# Patient Record
Sex: FEMALE | Race: WHITE | Employment: OTHER | URBAN - METROPOLITAN AREA
[De-identification: names, ages, dates, MRNs, and addresses within clinical notes are randomized per-mention and may not be internally consistent; named-entity substitution may affect disease eponyms.]

---

## 2017-01-10 ENCOUNTER — RADIANT APPOINTMENT (OUTPATIENT)
Dept: GENERAL RADIOLOGY | Facility: CLINIC | Age: 77
End: 2017-01-10
Attending: PHYSICIAN ASSISTANT
Payer: COMMERCIAL

## 2017-01-10 ENCOUNTER — OFFICE VISIT (OUTPATIENT)
Dept: ORTHOPEDICS | Facility: CLINIC | Age: 77
End: 2017-01-10
Payer: COMMERCIAL

## 2017-01-10 ENCOUNTER — OFFICE VISIT (OUTPATIENT)
Dept: INTERNAL MEDICINE | Facility: CLINIC | Age: 77
End: 2017-01-10
Payer: COMMERCIAL

## 2017-01-10 VITALS
OXYGEN SATURATION: 97 % | DIASTOLIC BLOOD PRESSURE: 86 MMHG | RESPIRATION RATE: 16 BRPM | HEART RATE: 88 BPM | BODY MASS INDEX: 30.73 KG/M2 | HEIGHT: 64 IN | WEIGHT: 180 LBS | TEMPERATURE: 97.4 F | SYSTOLIC BLOOD PRESSURE: 144 MMHG

## 2017-01-10 VITALS — HEIGHT: 64 IN | HEART RATE: 88 BPM | WEIGHT: 180 LBS | BODY MASS INDEX: 30.73 KG/M2

## 2017-01-10 DIAGNOSIS — S72.002D CLOSED LEFT HIP FRACTURE, WITH ROUTINE HEALING, SUBSEQUENT ENCOUNTER: Primary | ICD-10-CM

## 2017-01-10 DIAGNOSIS — G25.81 RESTLESS LEG SYNDROME: ICD-10-CM

## 2017-01-10 DIAGNOSIS — G60.9 IDIOPATHIC PERIPHERAL NEUROPATHY: ICD-10-CM

## 2017-01-10 DIAGNOSIS — M70.62 TROCHANTERIC BURSITIS OF LEFT HIP: ICD-10-CM

## 2017-01-10 DIAGNOSIS — M25.552 PAIN OF LEFT HIP JOINT: ICD-10-CM

## 2017-01-10 DIAGNOSIS — M25.552 PAIN OF LEFT HIP JOINT: Primary | ICD-10-CM

## 2017-01-10 DIAGNOSIS — S72.002A FRACTURE OF HIP, LEFT, CLOSED, INITIAL ENCOUNTER (H): ICD-10-CM

## 2017-01-10 LAB
ALBUMIN SERPL-MCNC: 2.7 G/DL (ref 3.4–5)
ALP SERPL-CCNC: 173 U/L (ref 40–150)
ALT SERPL W P-5'-P-CCNC: 20 U/L (ref 0–50)
ANION GAP SERPL CALCULATED.3IONS-SCNC: 9 MMOL/L (ref 3–14)
AST SERPL W P-5'-P-CCNC: 15 U/L (ref 0–45)
BILIRUB SERPL-MCNC: 0.3 MG/DL (ref 0.2–1.3)
BUN SERPL-MCNC: 18 MG/DL (ref 7–30)
CALCIUM SERPL-MCNC: 7.5 MG/DL (ref 8.5–10.1)
CHLORIDE SERPL-SCNC: 113 MMOL/L (ref 94–109)
CO2 SERPL-SCNC: 26 MMOL/L (ref 20–32)
CREAT SERPL-MCNC: 1.26 MG/DL (ref 0.52–1.04)
GFR SERPL CREATININE-BSD FRML MDRD: 41 ML/MIN/1.7M2
GLUCOSE SERPL-MCNC: 217 MG/DL (ref 70–99)
POTASSIUM SERPL-SCNC: 3.8 MMOL/L (ref 3.4–5.3)
PROT SERPL-MCNC: 5.5 G/DL (ref 6.8–8.8)
SODIUM SERPL-SCNC: 148 MMOL/L (ref 133–144)
TSH SERPL DL<=0.05 MIU/L-ACNC: 1.03 MU/L (ref 0.4–4)
VIT B12 SERPL-MCNC: 221 PG/ML (ref 193–986)

## 2017-01-10 PROCEDURE — 00000402 ZZHCL STATISTIC TOTAL PROTEIN: Performed by: INTERNAL MEDICINE

## 2017-01-10 PROCEDURE — 80053 COMPREHEN METABOLIC PANEL: CPT | Performed by: INTERNAL MEDICINE

## 2017-01-10 PROCEDURE — 73502 X-RAY EXAM HIP UNI 2-3 VIEWS: CPT | Mod: TC

## 2017-01-10 PROCEDURE — 36415 COLL VENOUS BLD VENIPUNCTURE: CPT | Performed by: INTERNAL MEDICINE

## 2017-01-10 PROCEDURE — 84165 PROTEIN E-PHORESIS SERUM: CPT | Performed by: INTERNAL MEDICINE

## 2017-01-10 PROCEDURE — 99213 OFFICE O/P EST LOW 20 MIN: CPT | Performed by: INTERNAL MEDICINE

## 2017-01-10 PROCEDURE — 20610 DRAIN/INJ JOINT/BURSA W/O US: CPT | Mod: LT | Performed by: ORTHOPAEDIC SURGERY

## 2017-01-10 PROCEDURE — 99213 OFFICE O/P EST LOW 20 MIN: CPT | Mod: 25 | Performed by: ORTHOPAEDIC SURGERY

## 2017-01-10 PROCEDURE — 84443 ASSAY THYROID STIM HORMONE: CPT | Performed by: INTERNAL MEDICINE

## 2017-01-10 PROCEDURE — 82607 VITAMIN B-12: CPT | Performed by: INTERNAL MEDICINE

## 2017-01-10 RX ORDER — BETAMETHASONE SODIUM PHOSPHATE AND BETAMETHASONE ACETATE 3; 3 MG/ML; MG/ML
18 INJECTION, SUSPENSION INTRA-ARTICULAR; INTRALESIONAL; INTRAMUSCULAR; SOFT TISSUE ONCE
Qty: 3 ML | Refills: 0 | COMMUNITY
Start: 2017-01-10 | End: 2017-03-03

## 2017-01-10 RX ORDER — AZITHROMYCIN 250 MG/1
TABLET, FILM COATED ORAL
Qty: 2 TABLET | Refills: 0 | Status: SHIPPED | OUTPATIENT
Start: 2017-01-10 | End: 2017-01-25

## 2017-01-10 RX ORDER — GABAPENTIN 300 MG/1
300 CAPSULE ORAL 2 TIMES DAILY
Qty: 180 CAPSULE | Refills: 1 | Status: SHIPPED | OUTPATIENT
Start: 2017-01-10 | End: 2017-05-15

## 2017-01-10 ASSESSMENT — PAIN SCALES - GENERAL
PAINLEVEL: MODERATE PAIN (5)
PAINLEVEL: NO PAIN (0)

## 2017-01-10 NOTE — NURSING NOTE
"Chief Complaint   Patient presents with     Foot Problems     bilateral foot tingling and pain       Initial /86 mmHg  Pulse 88  Temp(Src) 97.4  F (36.3  C) (Temporal)  Resp 16  Ht 5' 4\" (1.626 m)  Wt 180 lb (81.647 kg)  BMI 30.88 kg/m2  SpO2 97% Estimated body mass index is 30.88 kg/(m^2) as calculated from the following:    Height as of this encounter: 5' 4\" (1.626 m).    Weight as of this encounter: 180 lb (81.647 kg).  BP completed using cuff size: maynor Hernandez MA    "

## 2017-01-10 NOTE — PROGRESS NOTES
SUBJECTIVE:                                                    Kathryn Banks is a 76 year old female who presents to clinic today for the following health issues:    Chief Complaint   Patient presents with     Foot Problems     bilateral foot tingling and pain     She has been feeling tingling and prickling in the bottom of her feet for the last month, thought she stepped on something.  Happens more at night.  Just in the bottom of the feet, not higher or in the legs.      Patient wonders us if it's related to her hip fracture. However its full sides. She does walk around without shoes. She is usually in a wheelchair but does walk some at home.    Past Medical History   Diagnosis Date     Unspecified essential hypertension      OBSTRUCTIVE SLEEP APNEA      using CPAP     Carpal tunnel syndrome      RESTLESS LEGS SYNDROME      Other and combined forms of senile cataract 2000     Bilateral     Other and unspecified hyperlipidemia      Osteoarthrosis, unspecified whether generalized or localized, unspecified site      generalized arthritis, particularly in her hands and feet         Atrial fibrillation (H) 1/17/2007     TENOSYNOV HAND/WRIST NEC 6/30/2006     Type II or unspecified type diabetes mellitus without mention of complication, not stated as uncontrolled 2001     Diabetes mellitus/pt is diet controlled with weight loss     Coronary atherosclerosis of native coronary artery 2006     5 vessel CAD     Current Outpatient Prescriptions   Medication     azithromycin (ZITHROMAX) 250 MG tablet     gabapentin (NEURONTIN) 300 MG capsule     metoprolol (TOPROL XL) 200 MG 24 hr tablet     glipiZIDE (GLIPIZIDE XL) 10 MG 24 hr tablet     furosemide (LASIX) 40 MG tablet     Warfarin Sodium (COUMADIN PO)     IRBESARTAN PO     ferrous sulfate (IRON) 325 (65 FE) MG tablet     calcium carbonate-vitamin D 500-400 MG-UNIT TABS tablt     simvastatin (ZOCOR) 40 MG tablet     isosorbide mononitrate (IMDUR) 30 MG 24 hr tablet      "pramipexole (MIRAPEX) 1 MG tablet     acetaminophen-codeine (TYLENOL W/CODEINE NO. 3) 300-30 MG per tablet     [DISCONTINUED] gabapentin (NEURONTIN) 300 MG capsule     ASPIRIN NOT PRESCRIBED (INTENTIONAL)     ORDER FOR DME     nitroglycerin (NITROSTAT) 0.4 MG SL tablet     No current facility-administered medications for this visit.     Social History   Substance Use Topics     Smoking status: Former Smoker -- 10 years     Quit date: 01/01/1969     Smokeless tobacco: Never Used     Alcohol Use: 0.0 oz/week     0 Standard drinks or equivalent per week      Comment: occasional- 2 drinks per month     Physical Exam  /86 mmHg  Pulse 88  Temp(Src) 97.4  F (36.3  C) (Temporal)  Resp 16  Ht 5' 4\" (1.626 m)  Wt 180 lb (81.647 kg)  BMI 30.88 kg/m2  SpO2 97%  General Appearance-healthy, alert, no distress  Feet have decreased but positive pulses, so dependent rubor, no pain on palpation of the heel, plantar fascia or toes. Normal sensation intact    ASSESSMENT:  Patient appears to have some peripheral neuropathy of the location and description of the pain, possibly complicated by restless legs comes on at night. She should go back on gabapentin 300 mg twice a day. We will get labs to work this up in clinic TSH, SPEP, B-12, and comprehensive panel. She should go on oral vitamin D replacement. She should let us know if not getting better. She should also be wearing shoes support her arch and heel all the time.    Electronically signed by Dheeraj Jj MD    "

## 2017-01-10 NOTE — PROGRESS NOTES
Appropriate assistive devices provided during their visit. yes (Yes, No, N/A) wheelchair (list device)    Exam table and/or cart  placed in the lowest position. yes (Yes, No, N/A)    Brakes on tables/carts/wheelchairs used at all times. yes (Yes, No, N/A)    Non slip footwear applied. na (Yes, No, NA)    Patient was accompanied by staff throughout visit. yes (Yes, No, N/A)    Equipment safety straps used. na (Yes, No, N/A)    Assist with toileting. na (Yes, No, N/A)  Lana Xiong  1:57 PM  1/10/2017

## 2017-01-10 NOTE — MR AVS SNAPSHOT
After Visit Summary   1/10/2017    Kathryn Banks    MRN: [id number]           Patient Information     Date Of Birth          1940        Visit Information        Provider Department      1/10/2017 12:00 PM Dheeraj Jj MD [facility]         Follow-ups after your visit        Your next 10 appointments already scheduled     Antony 10, 2017  1:30 PM   Return Visit with Lake Schulz MD   [facility]    [address] [place] [id number] [phone]              Who to contact     If you have questions or need follow up information about today's clinic visit or your schedule please contact [facility] directly at [phone].  Normal or non-critical lab and imaging results will be communicated to you by ClassLinkhart, letter or phone within 4 business days after the clinic has received the results. If you do not hear from us within 7 days, please contact the clinic through ClassLink[employer] or phone. If you have a critical or abnormal lab result, we will notify you by phone as soon as possible.  Submit refill requests through [employer] or call your pharmacy and they will forward the refill request to us. Please allow 3 business days for your refill to be completed.          Additional Information About Your Visit        My[employer]Rip[employer]Online Information     [employer] gives you secure access to your electronic health record. If you see a primary care provider, you can also send messages to your care team and make appointments. If you have questions, please call your primary care clinic.  If you do not have a primary care provider, please call [phone] and they will assist you.        Care EveryWhere ID     This is your Care EveryWhere ID. This could be used by other organizations to access your [facility] medical records  [id number]        Your Vitals Were     Pulse Temperature Respirations Height BMI (Body Mass Index) Pulse Oximetry  "   88 97.4  F (36.3  C) (Temporal) 16 5' 4\" (1.626 m) 30.88 kg/m2 97%       Blood Pressure from Last 3 Encounters:   01/10/17 144/86   09/13/16 128/72   08/26/16 165/79    Weight from Last 3 Encounters:   01/10/17 180 lb (81.647 kg)   10/24/16 166 lb (75.297 kg)   09/13/16 179 lb (81.194 kg)              Today, you had the following     No orders found for display       Primary Care Provider Office Phone # Fax #    Dheeraj Jj [phone]       [facility] [address] [place] [address]        Thank you!     Thank you for choosing [facility]  for your care. Our goal is always to provide you with excellent care. Hearing back from our patients is one way we can continue to improve our services. Please take a few minutes to complete the written survey that you may receive in the mail after your visit with us. Thank you!             Your Updated Medication List - Protect others around you: Learn how to safely use, store and throw away your medicines at www.disposemymeds.org.          This list is accurate as of: 1/10/17 12:21 PM.  Always use your most recent med list.                   Brand Name Dispense Instructions for use    acetaminophen-codeine 300-30 MG per tablet    TYLENOL w/CODEINE No. 3    60 tablet    Take 1-2 tablets by mouth every 6 hours as needed for mild pain       ASPIRIN NOT PRESCRIBED    INTENTIONAL    0 each    Antiplatelet medication not prescribed intentionally due to Current anticoagulant therapy (warfarin/enoxaparin)       calcium carbonate-vitamin D 500-400 MG-UNIT Tabs per tablet     180 tablet    Take 1 tablet by mouth 3 times daily       COUMADIN PO      Take by mouth See Admin Instructions       ferrous sulfate 325 (65 FE) MG tablet    IRON    100 tablet    Take 1 tablet (325 mg) by mouth daily (with breakfast)       furosemide 40 MG tablet    LASIX    90 tablet    80 mg in the morning and 40 mg at noon       gabapentin 300 MG capsule "    NEURONTIN    180 capsule    Take 1 capsule (300 mg) by mouth 2 times daily       glipiZIDE 10 MG 24 hr tablet    glipiZIDE XL    90 tablet    Take 1 tablet (10 mg) by mouth daily       IRBESARTAN PO      Take 150 mg by mouth At Bedtime       isosorbide mononitrate 30 MG 24 hr tablet    IMDUR    30 tablet    TAKE ONE-HALF TABLET BY MOUTH EVERY DAY       metoprolol 200 MG 24 hr tablet    TOPROL XL    90 tablet    Take 1 tablet (200 mg) by mouth daily 50mg tabs:  4 tabs = 200mg po daily       nitroglycerin 0.4 MG sublingual tablet    NITROSTAT    25 tablet    Place 1 tablet under the tongue See Admin Instructions. for chest pain       order for DME     1 Device    Equipment being ordered: cpap machine, mask, humidifier, tubing and filters       pramipexole 1 MG tablet    MIRAPEX    270 tablet    TAKE 3 TABLETS BY MOUTH EVERY EVENING       simvastatin 40 MG tablet    ZOCOR    90 tablet    TAKE ONE TABLET BY MOUTH EVERY NIGHT AT BEDTIME (NEEDS TO SCHEDULE FASTING LABS BEFORE ADDITIONAL REFILLS)

## 2017-01-10 NOTE — NURSING NOTE
"Chief Complaint   Patient presents with     RECHECK     OPEN REDUCTION INTERNAL FIXATION LEFT HIP NAILING.  DOS: 7/3/16 (6 months postop)     Surgical Followup     PREOPERATIVE DIAGNOSIS:  Left hip displaced intertrochanteric fracture.  POSTOPERATIVE DIAGNOSIS:  Left hip displaced intertrochanteric fracture. PROCEDURE:  Closed reduction with internal fixation using intramedullary device.       Initial Pulse 88  Ht 1.626 m (5' 4\")  Wt 81.647 kg (180 lb)  BMI 30.88 kg/m2 Estimated body mass index is 30.88 kg/(m^2) as calculated from the following:    Height as of this encounter: 1.626 m (5' 4\").    Weight as of this encounter: 81.647 kg (180 lb).  BP completed using cuff size: NA (Not Taken)  MELIZA Bradford  "

## 2017-01-11 ENCOUNTER — TELEPHONE (OUTPATIENT)
Dept: FAMILY MEDICINE | Facility: CLINIC | Age: 77
End: 2017-01-11

## 2017-01-11 ENCOUNTER — ANTICOAGULATION THERAPY VISIT (OUTPATIENT)
Dept: ANTICOAGULATION | Facility: CLINIC | Age: 77
End: 2017-01-11

## 2017-01-11 DIAGNOSIS — I48.0 PAROXYSMAL ATRIAL FIBRILLATION (H): ICD-10-CM

## 2017-01-11 DIAGNOSIS — Z79.01 LONG-TERM (CURRENT) USE OF ANTICOAGULANTS: Primary | ICD-10-CM

## 2017-01-11 LAB
ALBUMIN SERPL ELPH-MCNC: 2.9 G/DL (ref 3.7–5.1)
ALPHA1 GLOB SERPL ELPH-MCNC: 0.3 G/DL (ref 0.2–0.4)
ALPHA2 GLOB SERPL ELPH-MCNC: 0.7 G/DL (ref 0.5–0.9)
B-GLOBULIN SERPL ELPH-MCNC: 0.6 G/DL (ref 0.6–1)
GAMMA GLOB SERPL ELPH-MCNC: 0.6 G/DL (ref 0.7–1.6)
INR PPP: 1.1
M PROTEIN SERPL ELPH-MCNC: 0 G/DL
PROT PATTERN SERPL ELPH-IMP: ABNORMAL

## 2017-01-11 NOTE — PROGRESS NOTES
ANTICOAGULATION FOLLOW-UP CLINIC VISIT    Patient Name:  Kathryn Banks  Date:  1/11/2017  Contact Type:  Telephone    SUBJECTIVE:     Patient Findings     Positives Missed doses (pt missed 'a couple doses' but is not sure which days)    Comments Reason for call:  INR Reason for Call:  INR      Who is calling?  Home calling center      Phone number:  [phone] option 2        Fax number:  na      Name of caller: Christiana      INR Value:  1.1       Are there any other concerns:  NO    Can we leave a detailed message on this number? NO      Phone number patient can be reached at: Other phone number:  [phone] option 2            Call taken on 1/11/2017 at 3:15 PM by Michelle Leslie              OBJECTIVE    INR   Date Value Ref Range Status   01/11/2017 1.1  Final       ASSESSMENT / PLAN  INR assessment SUB    Recheck INR In: 2 DAYS    INR Location Home INR    Billed home INR Yes      Anticoagulation Summary as of 1/11/2017     INR goal 2.0-3.0   Selected INR 1.1! (1/11/2017)   Maintenance plan 2.5 mg (2.5 mg x 1) on Sun, Tue, Thu; 5 mg (2.5 mg x 2) all other days   Full instructions 1/12: 5 mg; Otherwise 2.5 mg on Sun, Tue, Thu; 5 mg all other days   Weekly total 27.5 mg   Plan last modified Yovani Ruvalcaba, RN (9/2/2016)   Next INR check 1/13/2017   Target end date     Indications   Long-term (current) use of anticoagulants [Z79.01] [Z79.01]  Atrial fibrillation (H) (Resolved) [I48.91]  Paroxysmal atrial fibrillation (H) [I48.0]         Anticoagulation Episode Summary     INR check location     Preferred lab     Send INR reminders to [facility] RANDI    Comments 2.5 mg tablets, pm dose. Alere home monitor      Anticoagulation Care Providers     Provider Role Specialty Phone number    Dheeraj Jj MD [facility] [phone]            See the Encounter Report to view Anticoagulation Flowsheet and Dosing Calendar (Go to Encounters tab in chart review, and find the Anticoagulation Therapy  Visit)    Dosage adjustment made based on physician directed care plan.    Pt's INR on home monitor was 1.1- pt states she 'missed a couple doses' but is not sure which days. I have advised her to take 5 mg Wed and Thurs, recheck on Friday 1/13.    Zo Norwood RN

## 2017-01-11 NOTE — TELEPHONE ENCOUNTER
Reason for call:  INR Reason for Call:  INR    Who is calling?  Home calling center    Phone number:  [phone] option 2    Fax number:  na    Name of caller: Christiana    INR Value:  1.1     Are there any other concerns:  NO   Can we leave a detailed message on this number? NO    Phone number patient can be reached at: Other phone number:  [phone] option 2      Call taken on 1/11/2017 at 3:15 PM by Michelle Leslie

## 2017-01-13 ENCOUNTER — ANTICOAGULATION THERAPY VISIT (OUTPATIENT)
Dept: ANTICOAGULATION | Facility: CLINIC | Age: 77
End: 2017-01-13

## 2017-01-13 DIAGNOSIS — Z79.01 LONG-TERM (CURRENT) USE OF ANTICOAGULANTS: Primary | ICD-10-CM

## 2017-01-13 DIAGNOSIS — I48.0 PAROXYSMAL ATRIAL FIBRILLATION (H): ICD-10-CM

## 2017-01-13 LAB — INR PPP: 1.8

## 2017-01-13 RX ORDER — WARFARIN SODIUM 2.5 MG/1
TABLET ORAL
Qty: 50 TABLET | Refills: 0 | Status: SHIPPED | OUTPATIENT
Start: 2017-01-13 | End: 2017-04-19

## 2017-01-13 RX ORDER — WARFARIN SODIUM 2.5 MG/1
TABLET ORAL
Qty: 50 TABLET | Refills: 0 | Status: SHIPPED | OUTPATIENT
Start: 2017-01-13 | End: 2017-01-13

## 2017-01-13 NOTE — PROGRESS NOTES
ANTICOAGULATION FOLLOW-UP CLINIC VISIT    Patient Name:  Kathryn Banks  Date:  1/13/2017  Contact Type:  Telephone    SUBJECTIVE:     Patient Findings     Positives Missed doses (pt missed a couple days this last week but is unsure of the days)           OBJECTIVE    INR   Date Value Ref Range Status   01/13/2017 1.8  Final       ASSESSMENT / PLAN  INR assessment SUB    Recheck INR In: 1 WEEK    INR Location Home INR    Billed home INR No      Anticoagulation Summary as of 1/13/2017     INR goal 2.0-3.0   Selected INR 1.8! (1/13/2017)   Maintenance plan 2.5 mg (2.5 mg x 1) on Tue, Thu, Sat; 5 mg (2.5 mg x 2) all other days   Full instructions 2.5 mg on Tue, Thu, Sat; 5 mg all other days   Weekly total 27.5 mg   Plan last modified Zo Norwood RN (1/13/2017)   Next INR check 1/20/2017   Target end date     Indications   Long-term (current) use of anticoagulants [Z79.01] [Z79.01]  Atrial fibrillation (H) (Resolved) [I48.91]  Paroxysmal atrial fibrillation (H) [I48.0]         Anticoagulation Episode Summary     INR check location     Preferred lab     Send [facility] reminders to [facility] POOL    Comments 2.5 mg tablets, pm dose. Alere home monitor      Anticoagulation Care Providers     Provider Role Specialty Phone number    Dheeraj Jj MD [facility] [phone]            See the Encounter Report to view Anticoagulation Flowsheet and Dosing Calendar (Go to Encounters tab in chart review, and find the Anticoagulation Therapy Visit)    Dosage adjustment made based on physician directed care plan.     Pt's INR was 1.8 today. She took 5 mg on Wednesday and Thursday.  Her INR earlier this week was 1.1 but she stated she missed a couple days but is unsure of which days. I have advised her to go back to her usual dose of 2.5 mg Tues, Thurs, Sat and 5 mg ROW and recheck in a week, as she has been stable on that dose.             Zo Norwood, RN

## 2017-01-17 DIAGNOSIS — S72.002D CLOSED LEFT HIP FRACTURE, WITH ROUTINE HEALING, SUBSEQUENT ENCOUNTER: ICD-10-CM

## 2017-01-17 DIAGNOSIS — I25.2 HX OF NON-ST ELEVATION MYOCARDIAL INFARCTION (NSTEMI): Primary | ICD-10-CM

## 2017-01-17 NOTE — TELEPHONE ENCOUNTER
IMDUR      Last Written Prescription Date: 1/19/16  Last Fill Quantity: 30,  # refills: 2   Last Office Visit with [facility] or [facility] prescribing provider: 1/10/17                                               Maxine Gu MA 1/17/2017

## 2017-01-18 RX ORDER — ISOSORBIDE MONONITRATE 30 MG/1
15 TABLET, EXTENDED RELEASE ORAL DAILY
Qty: 30 TABLET | Refills: 2 | Status: SHIPPED | OUTPATIENT
Start: 2017-01-18 | End: 2017-03-03

## 2017-01-18 NOTE — TELEPHONE ENCOUNTER
Routing refill request to provider for review/approval because:  A break in medication  Jing Vu RN

## 2017-01-20 ENCOUNTER — ANTICOAGULATION THERAPY VISIT (OUTPATIENT)
Dept: ANTICOAGULATION | Facility: CLINIC | Age: 77
End: 2017-01-20

## 2017-01-20 DIAGNOSIS — I48.0 PAROXYSMAL ATRIAL FIBRILLATION (H): ICD-10-CM

## 2017-01-20 DIAGNOSIS — Z79.01 LONG-TERM (CURRENT) USE OF ANTICOAGULANTS: Primary | ICD-10-CM

## 2017-01-24 ENCOUNTER — ANTICOAGULATION THERAPY VISIT (OUTPATIENT)
Dept: ANTICOAGULATION | Facility: CLINIC | Age: 77
End: 2017-01-24

## 2017-01-24 DIAGNOSIS — Z79.01 LONG-TERM (CURRENT) USE OF ANTICOAGULANTS: ICD-10-CM

## 2017-01-24 DIAGNOSIS — Z79.01 LONG-TERM (CURRENT) USE OF ANTICOAGULANTS: Primary | ICD-10-CM

## 2017-01-24 DIAGNOSIS — I48.0 PAROXYSMAL ATRIAL FIBRILLATION (H): ICD-10-CM

## 2017-01-24 LAB — INR BLD: 1.5 (ref 0.86–1.14)

## 2017-01-24 PROCEDURE — 36416 COLLJ CAPILLARY BLOOD SPEC: CPT | Performed by: INTERNAL MEDICINE

## 2017-01-24 PROCEDURE — 85610 PROTHROMBIN TIME: CPT | Mod: QW | Performed by: INTERNAL MEDICINE

## 2017-01-24 NOTE — PROGRESS NOTES
ANTICOAGULATION FOLLOW-UP CLINIC VISIT    Patient Name:  Kathryn Banks  Date:  1/24/2017  Contact Type:  Telephone    SUBJECTIVE:     Patient Findings     Positives Missed doses (missed Tues, Wed, Thurs last week)           OBJECTIVE    INR POINT OF CARE   Date Value Ref Range Status   01/24/2017 1.5* 0.86 - 1.14 Final     Comment:     This test is intended for monitoring Coumadin therapy.  Results are not   accurate   in patients with prolonged INR due to factor deficiency.         ASSESSMENT / PLAN  INR assessment SUB    Recheck INR In: 1 WEEK    INR Location Outside lab      Anticoagulation Summary as of 1/24/2017     INR goal 2.0-3.0   Selected INR 1.5! (1/24/2017)   Maintenance plan 2.5 mg (2.5 mg x 1) on Mon, Wed, Fri; 5 mg (2.5 mg x 2) all other days   Full instructions 2.5 mg on Mon, Wed, Fri; 5 mg all other days   Weekly total 27.5 mg   Plan last modified Zo Norwood RN (1/24/2017)   Next INR check 1/31/2017   Target end date     Indications   Long-term (current) use of anticoagulants [Z79.01] [Z79.01]  Atrial fibrillation (H) (Resolved) [I48.91]  Paroxysmal atrial fibrillation (H) [I48.0]         Anticoagulation Episode Summary     INR check location     Preferred lab     Send INR reminders to [facility] RANDI    Comments 2.5 mg tablets, pm dose. Alere home monitor      Anticoagulation Care Providers     Provider Role Specialty Phone number    Dheeraj Jj MD [facility] Internal Medicine [phone]            See the Encounter Report to view Anticoagulation Flowsheet and Dosing Calendar (Go to Encounters tab in chart review, and find the Anticoagulation Therapy Visit)    Delete key to remove if not charging [id number]    S/O:  Pt was called with the following orders: INR today of 1.5- lab.  Pt is on Coumadin for A. Fib.  Current Coumadin dose is missed Tues, Wed, Thurs and then took 5 mg Fri, Sat, Sun, Mon = 20 mg.   Concerns today are: None Noted.    A/P: Pt's INR is Subtherapeutic  for his/her goal  range of 2 - 3.  Reasons why INR is out of range may include:missed doses and ate more greens than normal last night  Recommended to have pt take 2.5 mg Mon, Wed, Fri and 5 mg ROW = 27.5 mg and to have his/her INR rechecked in 1 week on 1/*31.    Pt ran out of supplies so she had to have INR done at the lab. She said she called [facility] who said they will be arriving in a few days.           Zo Norwood RN

## 2017-01-25 RX ORDER — AZITHROMYCIN 250 MG/1
TABLET, FILM COATED ORAL
Qty: 2 TABLET | Refills: 0 | Status: SHIPPED | OUTPATIENT
Start: 2017-01-25 | End: 2017-03-03

## 2017-01-25 NOTE — TELEPHONE ENCOUNTER
Zithromax      Last Written Prescription Date: 1/10/17  Last Fill Quantity: 2,  # refills: 0   Last Office Visit with [facility], [facility] or [facility] prescribing provider: 1/10/2017

## 2017-02-01 ENCOUNTER — TELEPHONE (OUTPATIENT)
Dept: INTERNAL MEDICINE | Facility: CLINIC | Age: 77
End: 2017-02-01

## 2017-02-01 NOTE — TELEPHONE ENCOUNTER
Reason for Call:  Medication or medication refill:    Do you use a [facility]?  Name of the pharmacy and phone number for the current request:  [facility] - [phone]    Name of the medication requested: INR monitor, test strips and lancets    Other request: patient states her insurance changed and she has this stuff through her old insurance but will need to send it back. Patient stated that [facility] told her to call the clinic and get a script or order for this     Can we leave a detailed message on this number? YES    Phone number patient can be reached at: Home number on file [phone] (home)    Best Time: any    Call taken on 2/1/2017 at 10:42 AM by Jo-Ann Bartlett

## 2017-02-01 NOTE — TELEPHONE ENCOUNTER
Spoke with patient and she a home INR monitor.  She was told that the one she has belongs to her insurance and she needs to contact her provider to get a new one.  She was a bit confused about this as well as I am, I don't recall ever ordering a home INR monitor.  Will talk with coumadin clinic to see if they know anything about ordering a home monitor.

## 2017-02-01 NOTE — TELEPHONE ENCOUNTER
I am in contact with Tara [facility] to see what can be done/who she should contact. Awaiting response.   Zo Norwood RN

## 2017-02-01 NOTE — TELEPHONE ENCOUNTER
Per Tara, through [facility]- she will let her know that insurance has changed and will verify what to do next. Tara is aware that I am out of the office tomorrow, but she can call [phone] if she figures anything out in the meantime.   Pt notified of all of this. She will have IN done at the lab tomorrow since she does not have home monitor supplies at the moment  oZ Norwood RN

## 2017-02-03 ENCOUNTER — TRANSFERRED RECORDS (OUTPATIENT)
Dept: HEALTH INFORMATION MANAGEMENT | Facility: CLINIC | Age: 77
End: 2017-02-03

## 2017-02-04 LAB — EJECTION FRACTION: 63

## 2017-02-06 NOTE — TELEPHONE ENCOUNTER
I spoke with pt's , Christian, who said that she is at [facility] after taking a fall last week.   Zo Norwood RN

## 2017-02-08 LAB — EJECTION FRACTION: 63

## 2017-02-08 NOTE — TELEPHONE ENCOUNTER
I have attempted to contact this patient by phone, left message to return call at [phone] or [phone].  Will try again later.    Nicik Calvillo RN- Coumadin Clinic RN

## 2017-02-20 ENCOUNTER — TRANSFERRED RECORDS (OUTPATIENT)
Dept: HEALTH INFORMATION MANAGEMENT | Facility: CLINIC | Age: 77
End: 2017-02-20

## 2017-02-20 NOTE — TELEPHONE ENCOUNTER
I have attempted to contact this patient by phone, left message to return call at [phone] or [phone].  Will try again later.    Nicki Calvillo RN- Coumadin Clinic RN

## 2017-02-21 NOTE — TELEPHONE ENCOUNTER
Multiple attempts to contact, no phone call back. Will close encounter and reach out again in about a week.     Nicki Calvillo RN- Coumadin Clinic RN

## 2017-03-02 ENCOUNTER — TRANSFERRED RECORDS (OUTPATIENT)
Dept: HEALTH INFORMATION MANAGEMENT | Facility: CLINIC | Age: 77
End: 2017-03-02

## 2017-03-02 ENCOUNTER — TELEPHONE (OUTPATIENT)
Dept: INTERNAL MEDICINE | Facility: CLINIC | Age: 77
End: 2017-03-02

## 2017-03-02 NOTE — TELEPHONE ENCOUNTER
Reason for Call:  Same Day Appointment, Requested Provider:  Dheeraj Jj MD    PCP: Dheeraj Jj    Reason for visit: preop DOS 3/8/17  - pin removal from [facility]    Duration of symptoms: n/a    Have you been treated for this in the past? Yes    Additional comments:     Can we leave a detailed message on this number? YES    Phone number patient can be reached at: Home number on file [phone] (home)    Best Time:     Call taken on 3/2/2017 at 1:04 PM by Agatha Rosenthal

## 2017-03-03 ENCOUNTER — TELEPHONE (OUTPATIENT)
Dept: INTERNAL MEDICINE | Facility: CLINIC | Age: 77
End: 2017-03-03

## 2017-03-03 ENCOUNTER — OFFICE VISIT (OUTPATIENT)
Dept: INTERNAL MEDICINE | Facility: CLINIC | Age: 77
End: 2017-03-03
Payer: COMMERCIAL

## 2017-03-03 VITALS
SYSTOLIC BLOOD PRESSURE: 156 MMHG | OXYGEN SATURATION: 97 % | HEART RATE: 96 BPM | DIASTOLIC BLOOD PRESSURE: 94 MMHG | RESPIRATION RATE: 12 BRPM | BODY MASS INDEX: 29.87 KG/M2 | WEIGHT: 174 LBS | TEMPERATURE: 97.8 F

## 2017-03-03 DIAGNOSIS — N18.30 CHRONIC KIDNEY DISEASE, STAGE III (MODERATE) (H): ICD-10-CM

## 2017-03-03 DIAGNOSIS — Z79.01 LONG TERM CURRENT USE OF ANTICOAGULANT THERAPY: ICD-10-CM

## 2017-03-03 DIAGNOSIS — M79.89 SWELLING OF LIMB: ICD-10-CM

## 2017-03-03 DIAGNOSIS — E11.59 TYPE 2 DIABETES MELLITUS WITH OTHER CIRCULATORY COMPLICATIONS (H): ICD-10-CM

## 2017-03-03 DIAGNOSIS — Z01.818 PREOP GENERAL PHYSICAL EXAM: Primary | ICD-10-CM

## 2017-03-03 DIAGNOSIS — I25.2 HX OF NON-ST ELEVATION MYOCARDIAL INFARCTION (NSTEMI): ICD-10-CM

## 2017-03-03 DIAGNOSIS — S72.002G CLOSED LEFT HIP FRACTURE, WITH DELAYED HEALING, SUBSEQUENT ENCOUNTER: ICD-10-CM

## 2017-03-03 DIAGNOSIS — I25.10 ATHEROSCLEROSIS OF NATIVE CORONARY ARTERY OF NATIVE HEART WITHOUT ANGINA PECTORIS: ICD-10-CM

## 2017-03-03 LAB
ALBUMIN SERPL-MCNC: 2.8 G/DL (ref 3.4–5)
ALP SERPL-CCNC: 273 U/L (ref 40–150)
ALT SERPL W P-5'-P-CCNC: 29 U/L (ref 0–50)
ANION GAP SERPL CALCULATED.3IONS-SCNC: 8 MMOL/L (ref 3–14)
AST SERPL W P-5'-P-CCNC: 18 U/L (ref 0–45)
BILIRUB SERPL-MCNC: 0.3 MG/DL (ref 0.2–1.3)
BUN SERPL-MCNC: 34 MG/DL (ref 7–30)
CALCIUM SERPL-MCNC: 7.9 MG/DL (ref 8.5–10.1)
CHLORIDE SERPL-SCNC: 113 MMOL/L (ref 94–109)
CO2 SERPL-SCNC: 25 MMOL/L (ref 20–32)
CREAT SERPL-MCNC: 1.41 MG/DL (ref 0.52–1.04)
ERYTHROCYTE [DISTWIDTH] IN BLOOD BY AUTOMATED COUNT: 19 % (ref 10–15)
GFR SERPL CREATININE-BSD FRML MDRD: 36 ML/MIN/1.7M2
GLUCOSE SERPL-MCNC: 123 MG/DL (ref 70–99)
HCT VFR BLD AUTO: 38.1 % (ref 35–47)
HGB BLD-MCNC: 11.3 G/DL (ref 11.7–15.7)
MCH RBC QN AUTO: 23.9 PG (ref 26.5–33)
MCHC RBC AUTO-ENTMCNC: 29.7 G/DL (ref 31.5–36.5)
MCV RBC AUTO: 81 FL (ref 78–100)
PLATELET # BLD AUTO: 274 10E9/L (ref 150–450)
POTASSIUM SERPL-SCNC: 4.1 MMOL/L (ref 3.4–5.3)
PROT SERPL-MCNC: 5.9 G/DL (ref 6.8–8.8)
RBC # BLD AUTO: 4.73 10E12/L (ref 3.8–5.2)
SODIUM SERPL-SCNC: 146 MMOL/L (ref 133–144)
WBC # BLD AUTO: 7.2 10E9/L (ref 4–11)

## 2017-03-03 PROCEDURE — 36415 COLL VENOUS BLD VENIPUNCTURE: CPT | Performed by: INTERNAL MEDICINE

## 2017-03-03 PROCEDURE — 80053 COMPREHEN METABOLIC PANEL: CPT | Performed by: INTERNAL MEDICINE

## 2017-03-03 PROCEDURE — 93000 ELECTROCARDIOGRAM COMPLETE: CPT | Performed by: INTERNAL MEDICINE

## 2017-03-03 PROCEDURE — 85027 COMPLETE CBC AUTOMATED: CPT | Performed by: INTERNAL MEDICINE

## 2017-03-03 PROCEDURE — 99215 OFFICE O/P EST HI 40 MIN: CPT | Performed by: INTERNAL MEDICINE

## 2017-03-03 RX ORDER — METOPROLOL SUCCINATE 100 MG/1
150 TABLET, EXTENDED RELEASE ORAL DAILY
Qty: 90 TABLET | Refills: 3 | Status: ON HOLD | COMMUNITY
Start: 2017-03-03 | End: 2017-05-02

## 2017-03-03 RX ORDER — FUROSEMIDE 40 MG
TABLET ORAL
Qty: 90 TABLET | Refills: 3 | Status: ON HOLD | COMMUNITY
Start: 2017-03-03 | End: 2017-05-02

## 2017-03-03 RX ORDER — ISOSORBIDE MONONITRATE 30 MG/1
90 TABLET, EXTENDED RELEASE ORAL DAILY
Qty: 30 TABLET | Refills: 2 | COMMUNITY
Start: 2017-03-03 | End: 2017-05-18

## 2017-03-03 ASSESSMENT — PAIN SCALES - GENERAL: PAINLEVEL: MODERATE PAIN (5)

## 2017-03-03 NOTE — MR AVS SNAPSHOT
After Visit Summary   3/3/2017    Kathryn Banks    MRN: [id number]           Patient Information     Date Of Birth          1940        Visit Information        Provider Department      3/3/2017 4:15 PM Dheeraj Jj MD [facility]        Today's Diagnoses     Preop general physical exam    -  1    Hx of non-ST elevation myocardial infarction (NSTEMI)        Swelling of limb        Closed left hip fracture, with delayed healing, subsequent encounter        Chronic kidney disease, stage III (moderate)        Atherosclerosis of native coronary artery of native heart without angina pectoris        Long term current use of anticoagulant therapy        Type 2 diabetes mellitus with other circulatory complications (H)          Care Instructions      Before Your Surgery      Call your surgeon if there is any change in your health. This includes signs of a cold or flu (such as a sore throat, runny nose, cough, rash or fever).    Do not smoke, drink alcohol or take over the counter medicine (unless your surgeon or primary care doctor tells you to) for the 24 hours before and after surgery.    If you take prescribed drugs: Follow your doctor s orders about which medicines to take and which to stop until after surgery.    Eating and drinking prior to surgery: follow the instructions from your surgeon    Take a shower or bath the night before surgery. Use the soap your surgeon gave you to gently clean your skin. If you do not have soap from your surgeon, use your regular soap. Do not shave or scrub the surgery site.  Wear clean pajamas and have clean sheets on your bed.         Follow-ups after your visit        Who to contact     If you have questions or need follow up information about today's clinic visit or your schedule please contact [facility] directly at [phone].  Normal or non-critical lab and imaging results will be communicated to you by MyChart, letter or  phone within 4 business days after the clinic has received the results. If you do not hear from us within 7 days, please contact the clinic through [employer] or phone. If you have a critical or abnormal lab result, we will notify you by phone as soon as possible.  Submit refill requests through [employer] or call your pharmacy and they will forward the refill request to us. Please allow 3 business days for your refill to be completed.          Additional Information About Your Visit        [employer] Information     [employer] gives you secure access to your electronic health record. If you see a primary care provider, you can also send messages to your care team and make appointments. If you have questions, please call your primary care clinic.  If you do not have a primary care provider, please call [phone] and they will assist you.        Care EveryWhere ID     This is your Care EveryWhere ID. This could be used by other organizations to access your [place] medical records  [id number]        Your Vitals Were     Pulse Temperature Respirations Pulse Oximetry BMI (Body Mass Index)       96 97.8  F (36.6  C) (Tympanic) 12 97% 29.87 kg/m2        Blood Pressure from Last 3 Encounters:   03/03/17 (!) 156/94   01/10/17 144/86   09/13/16 128/72    Weight from Last 3 Encounters:   03/03/17 174 lb (78.9 kg)   01/10/17 180 lb (81.6 kg)   01/10/17 180 lb (81.6 kg)              We Performed the Following     CBC with platelets     Comprehensive metabolic panel     EKG 12-lead complete w/read - Clinics          Today's Medication Changes          These changes are accurate as of: 3/3/17  4:41 PM.  If you have any questions, ask your nurse or doctor.               These medicines have changed or have updated prescriptions.        Dose/Directions    isosorbide mononitrate 30 MG 24 hr tablet   Commonly known as:  IMDUR   This may have changed:  how much to take   Used for:  Hx of non-ST elevation myocardial infarction (NSTEMI)    Changed by:  Dheeraj Jj MD        Dose:  90 mg   Take 3 tablets (90 mg) by mouth daily   Quantity:  30 tablet   Refills:  2       LASIX 40 MG tablet   This may have changed:  additional instructions   Used for:  Swelling of limb   Generic drug:  furosemide   Changed by:  Dheeraj Jj MD        40 mg a day   Quantity:  90 tablet   Refills:  3       metoprolol 100 MG 24 hr tablet   Commonly known as:  TOPROL-XL   This may have changed:  Another medication with the same name was removed. Continue taking this medication, and follow the directions you see here.   Changed by:  Dheeraj Jj MD        Dose:  150 mg   Take 1.5 tablets (150 mg) by mouth daily   Quantity:  90 tablet   Refills:  3         Stop taking these medicines if you haven't already. Please contact your care team if you have questions.     acetaminophen-codeine 300-30 MG per tablet   Commonly known as:  TYLENOL w/CODEINE No. 3   Stopped by:  Dheeraj Jj MD           betamethasone acet & sod phos 6 (3-3) MG/ML Susp injection   Commonly known as:  CELESTONE   Stopped by:  Dheeraj Jj MD           IRBESARTAN PO   Stopped by:  Dheeraj Jj MD                    Primary Care Provider Office Phone # Fax #    Dheeraj Jj [phone]       [facility] [address] [place] [address]        Thank you!     Thank you for choosing [facility]  for your care. Our goal is always to provide you with excellent care. Hearing back from our patients is one way we can continue to improve our services. Please take a few minutes to complete the written survey that you may receive in the mail after your visit with us. Thank you!             Your Updated Medication List - Protect others around you: Learn how to safely use, store and throw away your medicines at www.disposemymeds.org.          This list is accurate as of: 3/3/17  4:41 PM.  Always use your most recent med list.                   Brand  Name Dispense Instructions for use    ASPIRIN NOT PRESCRIBED    INTENTIONAL    0 each    Antiplatelet medication not prescribed intentionally due to Current anticoagulant therapy (warfarin/enoxaparin)       calcium carbonate-vitamin D 500-400 MG-UNIT Tabs per tablet     180 tablet    Take 1 tablet by mouth 3 times daily       ferrous sulfate 325 (65 FE) MG tablet    IRON    100 tablet    Take 1 tablet (325 mg) by mouth daily (with breakfast)       gabapentin 300 MG capsule    NEURONTIN    180 capsule    Take 1 capsule (300 mg) by mouth 2 times daily       glipiZIDE 10 MG 24 hr tablet    glipiZIDE XL    90 tablet    Take 1 tablet (10 mg) by mouth daily       isosorbide mononitrate 30 MG 24 hr tablet    IMDUR    30 tablet    Take 3 tablets (90 mg) by mouth daily       LASIX 40 MG tablet   Generic drug:  furosemide     90 tablet    40 mg a day       metoprolol 100 MG 24 hr tablet    TOPROL-XL    90 tablet    Take 1.5 tablets (150 mg) by mouth daily       nitroglycerin 0.4 MG sublingual tablet    NITROSTAT    25 tablet    Place 1 tablet under the tongue See Admin Instructions. for chest pain       order for DME     1 Device    Equipment being ordered: cpap machine, mask, humidifier, tubing and filters       pramipexole 1 MG tablet    MIRAPEX    270 tablet    TAKE 3 TABLETS BY MOUTH EVERY EVENING       simvastatin 40 MG tablet    ZOCOR    90 tablet    TAKE ONE TABLET BY MOUTH EVERY NIGHT AT BEDTIME (NEEDS TO SCHEDULE FASTING LABS BEFORE ADDITIONAL REFILLS)       warfarin 2.5 MG tablet    COUMADIN    50 tablet    Take 2.5 mg (1 tablet) on Tuesday, Thursday, Saturday and 5 mg (2 tablets) all other days of the week, or as directed by the coumadin clinic

## 2017-03-03 NOTE — TELEPHONE ENCOUNTER
Reason for Call: Request for an order or referral:    Order or referral being requested: [facility] is requesting delay of orders until Sunday 3.5, please advise    Date needed: as soon as possible    Has the patient been seen by the PCP for this problem? YES    Additional comments:     Phone number Patient can be reached at:  Other phone number:  [phone]  Best Time:      Can we leave a detailed message on this number?  YES    Call taken on 3/3/2017 at 1:21 PM by Josephine Tejeda

## 2017-03-03 NOTE — PROGRESS NOTES
[address] [address]  [phone]  Dept: [phone]    PRE-OP EVALUATION:  Today's date: 3/3/2017    Kathryn Banks (: 1940) presents for pre-operative evaluation assessment as requested by Dr. Menchaca.  She requires evaluation and anesthesia risk assessment prior to undergoing surgery/procedure for treatment of removal of pin in left hip .  Proposed procedure: removal of pin in left hip    Date of Surgery/ Procedure: 3/8/2017  Time of Surgery/ Procedure:   Hospital/Surgical Facility: [facility]  Fax number for surgical facility: [phone]  Primary Physician: Dheeraj Jj  Type of Anesthesia Anticipated: General    Patient has a Health Care Directive or Living Will:  YES     1. NO - Do you have a history of heart attack, stroke, stent, bypass or surgery on an artery in the head, neck, heart or legs?  2. NO - Do you ever have any pain or discomfort in your chest?  3. YES - DO YOU HAVE A HISTORY OF HEART FAILURE -stable at this time on lasix.  4. NO - Are you troubled by shortness of breath when: walking on the level, up a slight hill or at night?  5. NO - Do you currently have a cold, bronchitis or other respiratory infection?  6. NO - Do you have a cough, shortness of breath or wheezing?  7. NO - Do you sometimes get pains in the calves of your legs when you walk?  8. NO - Do you or anyone in your family have previous history of blood clots?  9. NO - Do you or does anyone in your family have a serious bleeding problem such as prolonged bleeding following surgeries or cuts?  10. YES - HAVE YOU EVER HAD PROBLEMS WITH ANEMIA OR BEEN TOLD TO TAKE IRON PILLS?  Iron pills  11. NO - Have you had any abnormal blood loss such as black, tarry or bloody stools, or abnormal vaginal bleeding?  12. YES - HAVE YOU EVER HAD A BLOOD TRANSFUSION?    13. NO - Have you or any of your relatives ever had problems with anesthesia?  14. YES - DO YOU HAVE SLEEP APNEA,  EXCESSIVE SNORING OR DAYTIME DROWSINESS? Wears cpap  15. NO - Do you have any prosthetic heart valves?  16. NO - Do you have prosthetic joints?  17. NO - Is there any chance that you may be pregnant?      HPI:                                                      Brief HPI related to upcoming procedure: needs to have left hip repaired again      See problem list for active medical problems.  Problems all longstanding and stable, except as noted/documented.  See ROS for pertinent symptoms related to these conditions.                                                                                                  .    MEDICAL HISTORY:                                                      Patient Active Problem List    Diagnosis Date Noted     Trochanteric bursitis of left hip 10/11/2016     Priority: Medium     Essential hypertension with goal blood pressure less than 140/90 09/13/2016     Priority: Medium     Acute respiratory failure with hypoxia (H) 07/05/2016     Priority: Medium     Pulmonary infiltrate 07/02/2016     Priority: Medium     Acute kidney injury (H) 07/02/2016     Priority: Medium     Fracture of hip, left, closed, initial encounter (H) 07/02/2016     Priority: Medium     SIRS (systemic inflammatory response syndrome) (H) 07/02/2016     Priority: Medium     Long-term (current) use of anticoagulants [Z79.01] 03/04/2016     Priority: Medium     Type 2 diabetes mellitus with other circulatory complications (H) 01/19/2016     Priority: Medium     Non morbid obesity, unspecified obesity type 11/01/2015     Priority: Medium     Osteoarthritis of hip 04/29/2015     Priority: Medium     Renal failure 03/23/2015     Priority: Medium     Lymphedema 11/10/2014     Priority: Medium     Anemia 11/10/2014     Priority: Medium     Hyperkalemia 11/10/2014     Priority: Medium     Long term current use of anticoagulant therapy 09/16/2014     Priority: Medium     Problem list name updated by automated process. Provider  to review       Hypokalemia 07/15/2014     Priority: Medium     Jaw pain 02/22/2014     Priority: Medium     Health Care Home 01/20/2014     Priority: Medium     State Tier Level:  Tier 3  Status:  Closed  Care Coordinator:  Pinky Oden if needed in the future.     See Letters for [facility] Care Plan         Paroxysmal atrial fibrillation (H) 01/17/2014     Priority: Medium     CAD - NSTEMI, CABG x 5 2007, angio in 2013 with patent grafts other than occluded graft to PL 02/04/2013     Priority: Medium     Hx of non-ST elevation myocardial infarction (NSTEMI) 02/04/2013     Priority: Medium     ACS (acute coronary syndrome) (H) 02/01/2013     Priority: Medium     Hyperlipidemia LDL goal <100 10/31/2010     Priority: Medium     Kidney stone 12/29/2009     Priority: Medium     Obesity 01/31/2008     Priority: Medium     Problem list name updated by automated process. Provider to review       Edema 01/31/2008     Priority: Medium     Chronic kidney disease, stage III (moderate) 10/17/2007     Priority: Medium     ESOPHAGEAL REFLUX 03/28/2006     Priority: Medium     Lipoma of other skin and subcutaneous tissue 09/07/2004     Priority: Medium     Sleep apnea      Priority: Medium     Problem list name updated by automated process. Provider to review       Advanced directives, counseling/discussion 11/12/2012     Priority: Low     Advance Directive received and scanned. Click on Code in the patient header to view. 11/12/2012          History of peptic ulcer disease 10/17/2007     Priority: Low     Problem list name updated by automated process. Provider to review       Postsurgical aortocoronary bypass status 01/27/2007     Priority: Low     Restless leg syndrome      Priority: Low      Past Medical History   Diagnosis Date     Atrial fibrillation (H) 1/17/2007     Carpal tunnel syndrome      Coronary atherosclerosis of native coronary artery 2006     5 vessel CAD     OBSTRUCTIVE SLEEP APNEA      using CPAP     Osteoarthrosis,  unspecified whether generalized or localized, unspecified site      generalized arthritis, particularly in her hands and feet         Other and combined forms of senile cataract 2000     Bilateral     Other and unspecified hyperlipidemia      RESTLESS LEGS SYNDROME      TENOSYNOV HAND/WRIST NEC 6/30/2006     Type II or unspecified type diabetes mellitus without mention of complication, not stated as uncontrolled 2001     Diabetes mellitus/pt is diet controlled with weight loss     Unspecified essential hypertension      Past Surgical History   Procedure Laterality Date     C total abdom hysterectomy  1995      - fibroids     Hc removal of ovary/tube(s)       Salpingo-Oophorectomy, Unilateral     C appendectomy       Hc revise median n/carpal tunnel surg       Carpal tunnel release     C le w/o facetec foramot/dskc 1/2 vrt seg, lumbar  1968     C vagotomy/pyloroplasty,select  1970     C remv cataract intracap,insert lens       Bilateral     Hc colonoscopy thru stoma, diagnostic  10/7/04     poor prep, repeat in 2-3 years     Endoscopy  03/21/2000     Upper GI     Hc colonoscopy w/wo brush/wash  10/31/07     Repeat-poor quality prep     Hc ugi endoscopy diag w biopsy  10/31/07     Colonoscopy  12/3/2007     Hc ugi endoscopy, simple exam  12/3/2007     Cystoscopy  11/25/09     [place]     Angioplasty       C cabg, vein, five  12/06     SVG x 4 and LIMA to LAD     Colonoscopy  1/17/2014     Procedure: COLONOSCOPY;  Colonoscopy;  Surgeon: Zack Stearns MD;  Location:  GI     Open reduction internal fixation hip nailing Left 7/3/2016     Procedure: OPEN REDUCTION INTERNAL FIXATION HIP NAILING;  Surgeon: Lake Schulz MD;  Location: [facility] OR     Current Outpatient Prescriptions   Medication Sig Dispense Refill     isosorbide mononitrate (IMDUR) 30 MG 24 hr tablet Take 0.5 tablets (15 mg) by mouth daily 30 tablet 2     warfarin (COUMADIN) 2.5 MG tablet Take 2.5 mg (1 tablet) on Tuesday, Thursday, Saturday and 5  mg (2 tablets) all other days of the week, or as directed by the coumadin clinic 50 tablet 0     gabapentin (NEURONTIN) 300 MG capsule Take 1 capsule (300 mg) by mouth 2 times daily 180 capsule 1     betamethasone acet & sod phos (CELESTONE) 6 (3-3) MG/ML SUSP injection 3 mLs (18 mg) by INTRA-ARTICULAR route once 3 mL 0     metoprolol (TOPROL XL) 200 MG 24 hr tablet Take 1 tablet (200 mg) by mouth daily 50mg tabs:  4 tabs = 200mg po daily 90 tablet 1     acetaminophen-codeine (TYLENOL W/CODEINE NO. 3) 300-30 MG per tablet Take 1-2 tablets by mouth every 6 hours as needed for mild pain 60 tablet 0     glipiZIDE (GLIPIZIDE XL) 10 MG 24 hr tablet Take 1 tablet (10 mg) by mouth daily 90 tablet 3     furosemide (LASIX) 40 MG tablet 80 mg in the morning and 40 mg at noon 90 tablet 3     IRBESARTAN PO Take 150 mg by mouth At Bedtime       ferrous sulfate (IRON) 325 (65 FE) MG tablet Take 1 tablet (325 mg) by mouth daily (with breakfast) 100 tablet      calcium carbonate-vitamin D 500-400 MG-UNIT TABS tablt Take 1 tablet by mouth 3 times daily 180 tablet 3     simvastatin (ZOCOR) 40 MG tablet TAKE ONE TABLET BY MOUTH EVERY NIGHT AT BEDTIME (NEEDS TO SCHEDULE FASTING LABS BEFORE ADDITIONAL REFILLS) 90 tablet 3     ASPIRIN NOT PRESCRIBED (INTENTIONAL) Antiplatelet medication not prescribed intentionally due to Current anticoagulant therapy (warfarin/enoxaparin) 0 each 0     pramipexole (MIRAPEX) 1 MG tablet TAKE 3 TABLETS BY MOUTH EVERY EVENING 270 tablet 2     ORDER FOR DME Equipment being ordered: cpap machine, mask, humidifier, tubing and filters 1 Device 0     nitroglycerin (NITROSTAT) 0.4 MG SL tablet Place 1 tablet under the tongue See Admin Instructions. for chest pain 25 tablet 0     OTC products: None, except as noted above    Allergies   Allergen Reactions     Ciprofloxacin Nausea and Vomiting     Zofran did not help     Oxycodone Visual Disturbance     Delusions, blackouts      Lisinopril Cough     Penicillins Rash      Sulfa Drugs GI Disturbance     LOSS OF TASTE      Latex Allergy: NO    Social History   Substance Use Topics     Smoking status: Former Smoker     Years: 10.00     Quit date: 1/1/1969     Smokeless tobacco: Never Used     Alcohol use 0.0 oz/week     0 Standard drinks or equivalent per week      Comment: occasional- 2 drinks per month     History   Drug Use No       REVIEW OF SYSTEMS:                                                    Constitutional, neuro, ENT, endocrine, pulmonary, cardiac, gastrointestinal, genitourinary, musculoskeletal, integument and psychiatric systems are negative, except as otherwise noted.-limited movement but feeling good.    EXAM:                                                    BP (!) 156/94 (BP Location: Right arm, Patient Position: Chair, Cuff Size: Adult Small)  Pulse 96  Temp 97.8  F (36.6  C) (Tympanic)  Resp 12  Wt 174 lb (78.9 kg)  SpO2 97%  BMI 29.87 kg/m2  BP is high as she hasn't taken her medications today.       GENERAL APPEARANCE: healthy, alert and no distress     EYES: EOMI, PERRL     NECK: no adenopathy, no asymmetry, masses, or scars and thyroid normal to palpation--dentures      RESP: lungs clear to auscultation - no rales, rhonchi or wheezes     CV: regular rates and rhythm, normal S1 S2, no S3 or S4 and no murmur, click or rub     ABDOMEN:  soft, nontender, no HSM or masses and bowel sounds normal     MS: extremities normal- no gross deformities noted, no evidence of inflammation in joints, FROM in all extremities.     SKIN: no suspicious lesions or rashes     NEURO: Normal strength and tone, sensory exam grossly normal, mentation intact and speech normal     PSYCH: mentation appears normal. and affect normal/bright     LYMPHATICS: No axillary, cervical, or supraclavicular nodes    DIAGNOSTICS:                                                    EKG: appears normal, NSR, normal axis, normal intervals, no acute ST/T changes c/w ischemia, no LVH by voltage  criteria, unchanged from previous tracings    Recent Labs   Lab Test  01/24/17   1030 01/13/17   01/10/17   1246   09/13/16   1221   08/17/16   0818   07/13/16   0729   07/03/16   0555   04/12/16   1132   HGB   --    --    --    --    --   11.0*   --   10.1*   --   9.6*   < >  10.4*   < >  10.7*   PLT   --    --    --    --    --   257   --    --    --   372   < >  173   < >  231   INR  1.5*  1.8   < >   --    < >  1.87*   < >  1.86*   < >  1.76*   < >   --    < >   --    NA   --    --    --   148*   --   142   < >   --    --    --    < >  145*   < >  145*   POTASSIUM   --    --    --   3.8   --   4.8   < >  4.2   --    --    < >  5.1   < >  3.6   CR   --    --    --   1.26*   --   1.08*   < >  1.52*   --    --    < >  1.58*   < >  1.50*   A1C   --    --    --    --    --    --    --    --    --    --    --   6.6*   --   7.1*    < > = values in this interval not displayed.        IMPRESSION:                                                    Reason for surgery/procedure: left hip fracture    The proposed surgical procedure is considered INTERMEDIATE risk.    REVISED CARDIAC RISK INDEX  The patient has the following serious cardiovascular risks for perioperative complications such as (MI, PE, VFib and 3  AV Block):  High risk surgery (>5% cardiac complication risk)  INTERPRETATION: 2 risks: Class III (moderate risk - 6.6% complication rate)    The patient has the following additional risks for perioperative complications:  No identified additional risks    No diagnosis found.    RECOMMENDATIONS:                                                      --Consult hospital rounder / IM to assist post-op medical management    Cardiovascular Risk  Patient is already on a Beta Blocker. Continue Betablocker therapy after surgery, using Beta blocker order set as necessary for NPO status.      Obstructive Sleep Apnea (or suspected sleep apnea)  Patient is to bring their home CPAP with them on the day of surgery  Hospital staff  are advised to monitor for sleep related oxygen desaturations due to suspicion of ANABEL      --Patient is to take all scheduled medications on the day of surgery EXCEPT for modifications listed below.    Diabetes Medication Use  -----Hold usual  oral diabetic meds (e.g. Metformin, Actos, Glipizide) while NPO.       Anticoagulant or Antiplatelet Medication Use  WARFARIN: Thromboembolic risk is low (e.g. Single VTE >12 months ago, A fib and CHADS<3 without prior CVA/TIA) and warfarin may be discontinued in the perioperative period        APPROVAL GIVEN to proceed with proposed procedure, without further diagnostic evaluation   Patient is high risk but needs surgery done for recovery.    I spent greater than 50% of this 45 minute visit in counseling and coordination of care of her surgery and risk factors.  .    Signed Electronically by: Dheeraj Jj MD    Copy of this evaluation report is provided to requesting physician.    [facility] Preop Guidelines

## 2017-03-03 NOTE — NURSING NOTE
"Chief Complaint   Patient presents with     Pre-Op Exam     3/8/2017 removal of pin in left hip TCO       Initial BP (!) 156/94 (BP Location: Right arm, Patient Position: Chair, Cuff Size: Adult Small)  Pulse 96  Temp 97.8  F (36.6  C) (Tympanic)  Resp 12  Wt 174 lb (78.9 kg)  SpO2 97%  BMI 29.87 kg/m2 Estimated body mass index is 29.87 kg/(m^2) as calculated from the following:    Height as of 1/10/17: 5' 4\" (1.626 m).    Weight as of this encounter: 174 lb (78.9 kg).  Medication Reconciliation: complete    "

## 2017-03-08 ENCOUNTER — TRANSFERRED RECORDS (OUTPATIENT)
Dept: HEALTH INFORMATION MANAGEMENT | Facility: CLINIC | Age: 77
End: 2017-03-08

## 2017-03-20 ENCOUNTER — TELEPHONE (OUTPATIENT)
Dept: ANTICOAGULATION | Facility: CLINIC | Age: 77
End: 2017-03-20

## 2017-03-20 NOTE — TELEPHONE ENCOUNTER
Pt has been in NH and recently had surgery and now in [facility] rehab again. Spouse unsure of when she will be d/c'd. [facility] asked that they let us know when she returns home so we can be sure to continue to manage her INR.     Nicki Calvillo, RN- Coumadin Clinic RN

## 2017-03-28 ENCOUNTER — TELEPHONE (OUTPATIENT)
Dept: INTERNAL MEDICINE | Facility: CLINIC | Age: 77
End: 2017-03-28

## 2017-03-28 ENCOUNTER — ANTICOAGULATION THERAPY VISIT (OUTPATIENT)
Dept: ANTICOAGULATION | Facility: CLINIC | Age: 77
End: 2017-03-28

## 2017-03-28 DIAGNOSIS — Z79.01 LONG-TERM (CURRENT) USE OF ANTICOAGULANTS: ICD-10-CM

## 2017-03-28 DIAGNOSIS — I48.0 PAROXYSMAL ATRIAL FIBRILLATION (H): ICD-10-CM

## 2017-03-28 LAB — INR PPP: 1.3

## 2017-03-28 NOTE — MR AVS SNAPSHOT
Kathryn MICKY Banks   3/28/2017   Anticoagulation Therapy Visit    Description:  77 year old female   Provider:  Dheeraj Jj MD   Department:  Ph Anticoag           INR as of 3/28/2017     Today's INR 1.3!      Anticoagulation Summary as of 3/28/2017     INR goal 2.0-3.0   Today's INR 1.3!   Full instructions 3/29: 5 mg; 3/30: 2.5 mg; Otherwise 2.5 mg on Mon, Wed, Fri; 5 mg all other days   Next INR check 3/31/2017    Indications   Long-term (current) use of anticoagulants [Z79.01] [Z79.01]  Atrial fibrillation (H) (Resolved) [I48.91]  Paroxysmal atrial fibrillation (H) [I48.0]         Contact Numbers     Clinic Number:         March 2017 Details    Sun Mon Tue Wed Thu Fri Sat        1               2               3               4                 5               6               7               8               9               10               11                 12               13               14               15               16               17               18                 19               20               21               22               23               24               25                 26               27               28      5 mg   See details      29      5 mg         30      2.5 mg         31              Date Details   03/28 This INR check       Date of next INR:  3/31/2017         How to take your warfarin dose     To take:  2.5 mg Take 1 of the 2.5 mg tablets.    To take:  5 mg Take 2 of the 2.5 mg tablets.

## 2017-03-28 NOTE — TELEPHONE ENCOUNTER
Reason for Call: Request for an order     Order or referral being requested: Skilled nurse visits 2 x a wk for 2 wks, 1 x a wk for 4 wks, 3 PRN, home health aide 3 x a wk for 5 wks, 2 x a week for 1 wk, PTO/OT eval & treat.      Date needed: as soon as possible    Has the patient been seen by the PCP for this problem? YES    Additional comments:     Phone number Patient can be reached at:  Other phone number:  [phone]  Dagmar Taylor    Best Time:      Can we leave a detailed message on this number?  YES    Call taken on 3/28/2017 at 9:33 AM by Josephine Tejeda

## 2017-03-28 NOTE — PROGRESS NOTES
ANTICOAGULATION FOLLOW-UP CLINIC VISIT    Patient Name:  Kathryn Banks  Date:  3/28/2017  Contact Type:  Telephone/ VELASQUEZ [facility] nurse    SUBJECTIVE:     Patient Findings     Comments Reason for Call: INR     Who is calling? Home Care:      Phone number: [phone]           Name of caller: Dagmar Taylor     INR Value: 1.3     Are there any other concerns: No, other than it's low     Can we leave a detailed message on this number? YES     Phone number patient can be reached at: Other phone number: [phone]        Call taken on 3/28/2017 at 9:32 AM by Josephine Tejeda           OBJECTIVE    INR   Date Value Ref Range Status   03/28/2017 1.3  Final       ASSESSMENT / PLAN  INR assessment SUB    Recheck INR In: 3 DAYS    INR Location Homecare INR      Anticoagulation Summary as of 3/28/2017     INR goal 2.0-3.0   Today's INR 1.3!   Maintenance plan 2.5 mg (2.5 mg x 1) on Mon, Wed, Fri; 5 mg (2.5 mg x 2) all other days   Full instructions 3/29: 5 mg; 3/30: 2.5 mg; Otherwise 2.5 mg on Mon, Wed, Fri; 5 mg all other days   Weekly total 27.5 mg   Plan last modified Zo Norwood RN (1/24/2017)   Next INR check 3/31/2017   Target end date     Indications   Long-term (current) use of anticoagulants [Z79.01] [Z79.01]  Atrial fibrillation (H) (Resolved) [I48.91]  Paroxysmal atrial fibrillation (H) [I48.0]         Anticoagulation Episode Summary     INR check location     Preferred lab     Send INR reminders to [facility] RANDI    Comments 2.5 mg tablets, pm dose. Alere home monitor      Anticoagulation Care Providers     Provider Role Specialty Phone number    Dheeraj Jj MD Responsible Internal Medicine [phone]            See the Encounter Report to view Anticoagulation Flowsheet and Dosing Calendar (Go to Encounters tab in chart review, and find the Anticoagulation Therapy Visit)    Dosage adjustment made based on physician directed care plan.    Per HC nurse, pt said she has been alternating 2.5 mg with 5 mg; last  night she took 2.5 mg. This week's total is 25 mg. I did tell nurse that she should not alternate, as one week she will be getting more coumadin than the next week.     I have advised 5 mg Tues, Wed and 2.5 mg Thurs, recheck Friday = 27.5 mg    Zo Norwood RN

## 2017-03-28 NOTE — TELEPHONE ENCOUNTER
Reason for Call:  INR    Who is calling?  Home Care:     Phone number:  [phone]        Name of caller: Dagmar Taylor    INR Value:  1.3    Are there any other concerns:  No, other than it's low    Can we leave a detailed message on this number? YES    Phone number patient can be reached at: Other phone number:  [phone]      Call taken on 3/28/2017 at 9:32 AM by Josephine Tejeda

## 2017-03-30 ENCOUNTER — TELEPHONE (OUTPATIENT)
Dept: FAMILY MEDICINE | Facility: CLINIC | Age: 77
End: 2017-03-30

## 2017-03-30 NOTE — TELEPHONE ENCOUNTER
Reason for Call:  Same Day Appointment, Requested Provider:  Dheeraj Jj MD    PCP: Dheeraj Jj    Reason for visit: post op & check foot    Duration of symptoms: surgery 3-8-17 - foot - 1 year    Have you been treated for this in the past? No    Additional comments: Kathryn had surgery on 3-8-17, she was discharged from the [facility] on 3-24-17 and told to see her provider within 2 weeks for a post op, she would like to be worked into your schedule. She would also like you to check her foot which she has had trouble with for over 1 year, she thinks it could be neuropathy    Can we leave a detailed message on this number? YES    Phone number patient can be reached at: Home number on file [phone] (home)    Best Time: anytime    Call taken on 3/30/2017 at 8:55 AM by Katlyn Gavin

## 2017-03-31 ENCOUNTER — TELEPHONE (OUTPATIENT)
Dept: ANTICOAGULATION | Facility: CLINIC | Age: 77
End: 2017-03-31

## 2017-03-31 ENCOUNTER — OFFICE VISIT (OUTPATIENT)
Dept: INTERNAL MEDICINE | Facility: CLINIC | Age: 77
End: 2017-03-31
Payer: COMMERCIAL

## 2017-03-31 ENCOUNTER — ANTICOAGULATION THERAPY VISIT (OUTPATIENT)
Dept: ANTICOAGULATION | Facility: CLINIC | Age: 77
End: 2017-03-31

## 2017-03-31 VITALS
HEART RATE: 94 BPM | WEIGHT: 179 LBS | BODY MASS INDEX: 30.73 KG/M2 | DIASTOLIC BLOOD PRESSURE: 76 MMHG | OXYGEN SATURATION: 97 % | TEMPERATURE: 97.5 F | SYSTOLIC BLOOD PRESSURE: 126 MMHG

## 2017-03-31 DIAGNOSIS — Z79.01 LONG-TERM (CURRENT) USE OF ANTICOAGULANTS: ICD-10-CM

## 2017-03-31 DIAGNOSIS — Z79.01 LONG TERM CURRENT USE OF ANTICOAGULANT THERAPY: ICD-10-CM

## 2017-03-31 DIAGNOSIS — E53.8 VITAMIN B12 DEFICIENCY (NON ANEMIC): ICD-10-CM

## 2017-03-31 DIAGNOSIS — E11.59 TYPE 2 DIABETES MELLITUS WITH OTHER CIRCULATORY COMPLICATIONS (H): ICD-10-CM

## 2017-03-31 DIAGNOSIS — I10 ESSENTIAL HYPERTENSION WITH GOAL BLOOD PRESSURE LESS THAN 140/90: ICD-10-CM

## 2017-03-31 DIAGNOSIS — Z98.890 STATUS POST HIP SURGERY: Primary | ICD-10-CM

## 2017-03-31 DIAGNOSIS — I48.0 PAROXYSMAL ATRIAL FIBRILLATION (H): ICD-10-CM

## 2017-03-31 LAB
ALBUMIN SERPL-MCNC: 2.5 G/DL (ref 3.4–5)
ALP SERPL-CCNC: 263 U/L (ref 40–150)
ALT SERPL W P-5'-P-CCNC: 19 U/L (ref 0–50)
ANION GAP SERPL CALCULATED.3IONS-SCNC: 6 MMOL/L (ref 3–14)
AST SERPL W P-5'-P-CCNC: 17 U/L (ref 0–45)
BILIRUB SERPL-MCNC: 0.3 MG/DL (ref 0.2–1.3)
BUN SERPL-MCNC: 27 MG/DL (ref 7–30)
CALCIUM SERPL-MCNC: 7.4 MG/DL (ref 8.5–10.1)
CHLORIDE SERPL-SCNC: 116 MMOL/L (ref 94–109)
CO2 SERPL-SCNC: 26 MMOL/L (ref 20–32)
CREAT SERPL-MCNC: 1.49 MG/DL (ref 0.52–1.04)
ERYTHROCYTE [DISTWIDTH] IN BLOOD BY AUTOMATED COUNT: 20.1 % (ref 10–15)
GFR SERPL CREATININE-BSD FRML MDRD: 34 ML/MIN/1.7M2
GLUCOSE SERPL-MCNC: 116 MG/DL (ref 70–99)
HBA1C MFR BLD: 6.2 % (ref 4.3–6)
HCT VFR BLD AUTO: 33.3 % (ref 35–47)
HGB BLD-MCNC: 9.9 G/DL (ref 11.7–15.7)
INR BLD: 1.6 (ref 0.86–1.14)
MCH RBC QN AUTO: 24.2 PG (ref 26.5–33)
MCHC RBC AUTO-ENTMCNC: 29.7 G/DL (ref 31.5–36.5)
MCV RBC AUTO: 81 FL (ref 78–100)
PLATELET # BLD AUTO: 333 10E9/L (ref 150–450)
POTASSIUM SERPL-SCNC: 4.5 MMOL/L (ref 3.4–5.3)
PROT SERPL-MCNC: 5.5 G/DL (ref 6.8–8.8)
RBC # BLD AUTO: 4.09 10E12/L (ref 3.8–5.2)
SODIUM SERPL-SCNC: 148 MMOL/L (ref 133–144)
WBC # BLD AUTO: 5.7 10E9/L (ref 4–11)

## 2017-03-31 PROCEDURE — 85027 COMPLETE CBC AUTOMATED: CPT | Performed by: INTERNAL MEDICINE

## 2017-03-31 PROCEDURE — 85610 PROTHROMBIN TIME: CPT | Mod: QW | Performed by: INTERNAL MEDICINE

## 2017-03-31 PROCEDURE — 99213 OFFICE O/P EST LOW 20 MIN: CPT | Mod: 25 | Performed by: INTERNAL MEDICINE

## 2017-03-31 PROCEDURE — 36415 COLL VENOUS BLD VENIPUNCTURE: CPT | Performed by: INTERNAL MEDICINE

## 2017-03-31 PROCEDURE — 80053 COMPREHEN METABOLIC PANEL: CPT | Performed by: INTERNAL MEDICINE

## 2017-03-31 PROCEDURE — 83036 HEMOGLOBIN GLYCOSYLATED A1C: CPT | Performed by: INTERNAL MEDICINE

## 2017-03-31 PROCEDURE — 96372 THER/PROPH/DIAG INJ SC/IM: CPT | Performed by: INTERNAL MEDICINE

## 2017-03-31 RX ORDER — CYANOCOBALAMIN 1000 UG/ML
1 INJECTION, SOLUTION INTRAMUSCULAR; SUBCUTANEOUS
Qty: 1 ML | Refills: 11 | Status: ON HOLD | OUTPATIENT
Start: 2017-03-31 | End: 2017-08-18

## 2017-03-31 ASSESSMENT — PAIN SCALES - GENERAL: PAINLEVEL: NO PAIN (0)

## 2017-03-31 NOTE — TELEPHONE ENCOUNTER
Attempted to contact pt, no answer. Unable to leave a VM.   INR in lab today was 1.6.   Pt should take 5 mg Fri, Sat, Sun (2 tabs) and recheck on Monday with home monitor.   Zo Norwood RN

## 2017-03-31 NOTE — MR AVS SNAPSHOT
Kathryn WEEKS Jocelyn   3/31/2017   Anticoagulation Therapy Visit    Description:  77 year old female   Provider:  Dheeraj Jj MD   Department:  Ph Anticoag           INR as of 3/31/2017     Today's INR 1.6!      Anticoagulation Summary as of 3/31/2017     INR goal 2.0-3.0   Today's INR 1.6!   Full instructions 3/31: 5 mg; 4/1: 5 mg; 4/2: 5 mg   Next INR check 4/3/2017    Indications   Long-term (current) use of anticoagulants [Z79.01] [Z79.01]  Atrial fibrillation (H) (Resolved) [I48.91]  Paroxysmal atrial fibrillation (H) [I48.0]         Contact Numbers     Clinic Number:         March 2017 Details    Sun Mon Tue Wed Thu Fri Sat        1               2               3               4                 5               6               7               8               9               10               11                 12               13               14               15               16               17               18                 19               20               21               22               23               24               25                 26               27               28               29               30               31      5 mg   See details        Date Details   03/31 This INR check               How to take your warfarin dose     To take:  5 mg Take 2 of the 2.5 mg tablets.           April 2017 Details    Sun Mon Tue Wed Thu Fri Sat           1      5 mg           2      5 mg         3            4               5               6               7               8                 9               10               11               12               13               14               15                 16               17               18               19               20               21               22                 23               24               25               26               27               28               29                 30                      Date Details   No additional  details    Date of next INR:  4/3/2017         How to take your warfarin dose     To take:  5 mg Take 2 of the 2.5 mg tablets.

## 2017-03-31 NOTE — NURSING NOTE
"Chief Complaint   Patient presents with     Hospital F/U       Initial /76 (BP Location: Left arm, Patient Position: Chair, Cuff Size: Adult Large)  Pulse 94  Temp 97.5  F (36.4  C) (Temporal)  Wt 179 lb (81.2 kg)  SpO2 97%  BMI 30.73 kg/m2 Estimated body mass index is 30.73 kg/(m^2) as calculated from the following:    Height as of 1/10/17: 5' 4\" (1.626 m).    Weight as of this encounter: 179 lb (81.2 kg).  Medication Reconciliation: complete    "

## 2017-03-31 NOTE — TELEPHONE ENCOUNTER
Attempted to call pt again, no answer. Unable to leave a VM. Will need to try again before the end of the clinic day  Zo Norwood RN

## 2017-03-31 NOTE — TELEPHONE ENCOUNTER
I have attempted to contact this patient by phone with the following results: no answer. Unable to leave a message. We will attempt to call later. Gianni Palumbo RN, BSN

## 2017-03-31 NOTE — PROGRESS NOTES
SUBJECTIVE:                                                    Kathryn Banks is a 77 year old female who presents to clinic today for the following health issues:          Hospital Follow-up Visit:    Hospital/Nursing Home/ Rehab Facility: [facility]  Date of Admission: 3/12/17  Date of Discharge: 3/24/17  Reason(s) for Admission: hip surgery            Problems taking medications regularly:  None       Medication changes since discharge: None coumadin adjustment       Problems adhering to non-medication therapy:  None    Summary of hospitalization:  Discharge summary unavailable  Diagnostic Tests/Treatments reviewed.  Follow up needed: none  Other Healthcare Providers Involved in Patient s Care:         Homecare  Update since discharge: improved.     Post Discharge Medication Reconciliation: discharge medications reconciled and changed, per note/orders (see AVS).  Plan of care communicated with patient      Doing well, feels better after just two weeks, walking with a walker. Back home for a week.  She is helping her  some. Home health for her.  Coumadin is doing ok, INR is low at and working it up.      Sugars are running high at 160 range.    No pain pills,     Incision is doing well.    Homecare is doing therapy.      Past Medical History:   Diagnosis Date     Atrial fibrillation (H) 1/17/2007     Carpal tunnel syndrome      Coronary atherosclerosis of native coronary artery 2006    5 vessel CAD     OBSTRUCTIVE SLEEP APNEA     using CPAP     Osteoarthrosis, unspecified whether generalized or localized, unspecified site     generalized arthritis, particularly in her hands and feet         Other and combined forms of senile cataract 2000    Bilateral     Other and unspecified hyperlipidemia      RESTLESS LEGS SYNDROME      TENOSYNOV HAND/WRIST NEC 6/30/2006     Type II or unspecified type diabetes mellitus without mention of complication, not stated as uncontrolled 2001    Diabetes mellitus/pt is  diet controlled with weight loss     Unspecified essential hypertension      Current Outpatient Prescriptions   Medication     metoprolol (TOPROL-XL) 100 MG 24 hr tablet     furosemide (LASIX) 40 MG tablet     warfarin (COUMADIN) 2.5 MG tablet     gabapentin (NEURONTIN) 300 MG capsule     glipiZIDE (GLIPIZIDE XL) 10 MG 24 hr tablet     ferrous sulfate (IRON) 325 (65 FE) MG tablet     calcium carbonate-vitamin D 500-400 MG-UNIT TABS tablt     simvastatin (ZOCOR) 40 MG tablet     pramipexole (MIRAPEX) 1 MG tablet     isosorbide mononitrate (IMDUR) 30 MG 24 hr tablet     ASPIRIN NOT PRESCRIBED (INTENTIONAL)     ORDER FOR DME     nitroglycerin (NITROSTAT) 0.4 MG SL tablet     No current facility-administered medications for this visit.      Social History   Substance Use Topics     Smoking status: Former Smoker     Years: 10.00     Quit date: 1/1/1969     Smokeless tobacco: Never Used     Alcohol use 0.0 oz/week     0 Standard drinks or equivalent per week      Comment: occasional- 2 drinks per month     Review of Systems  Constitutional-No fevers, chills, or weight changes..  ENT-No earpain, sore throat, voice changes or rhinitis.  Cardiac-No chest pain or palpitations.  Respiratory-No cough, sob, or hemoptysis.  GI-No nausea, vomitting, diarrhea, constipation, or blood in the stool.  Musculoskeletal-left hip pain.  Endocrine-elevated sugars.    Physical Exam  /76 (BP Location: Left arm, Patient Position: Chair, Cuff Size: Adult Large)  Pulse 94  Temp 97.5  F (36.4  C) (Temporal)  Wt 179 lb (81.2 kg)  SpO2 97%  BMI 30.73 kg/m2  General Appearance-healthy, alert, no distress  Cardiac-regular rate and rhythm  with normal S1, S2 ; no murmur, rub or gallops  Lungs-clear to auscultation  Musculoskeletal-some swelling in her legs, 1+      ASSESSMENT:  Patient is here for a post nursing home check. She was at [facility] is recovering from her left hip surgery which was a replacement of the bello for a left hip  fracture. She is doing well with home care and home physical therapy she has no pain medications. She is up walking with a walker. Her swelling is adequate in her legs.    She does have some neuropathy in her feet which bother her at night. This could be from a low B-12 which was 212 in January. We will start her on B-12 injections. If that does not work over the next month or 2 then would increase her gabapentin.    Diabetes-the patient's on glipizide 10 mg a day but her sugars are running 160 range. She needs to be better than this. We will check her A1c and may get her back on metformin as her creatinine is in the 1.2-1.3 range.      Electronically signed by Dheeraj Jj MD

## 2017-03-31 NOTE — MR AVS SNAPSHOT
After Visit Summary   3/31/2017    Kathryn Banks    MRN: [id number]           Patient Information     Date Of Birth          1940        Visit Information        Provider Department      3/31/2017 2:15 PM Dheeraj Jj MD [facility]         Follow-ups after your visit        Who to contact     If you have questions or need follow up information about today's clinic visit or your schedule please contact [facility] directly at [phone].  Normal or non-critical lab and imaging results will be communicated to you by MyChart, letter or phone within 4 business days after the clinic has received the results. If you do not hear from us within 7 days, please contact the clinic through [employer] or phone. If you have a critical or abnormal lab result, we will notify you by phone as soon as possible.  Submit refill requests through [employer] or call your pharmacy and they will forward the refill request to us. Please allow 3 business days for your refill to be completed.          Additional Information About Your Visit        MyChart Information     [employer] gives you secure access to your electronic health record. If you see a primary care provider, you can also send messages to your care team and make appointments. If you have questions, please call your primary care clinic.  If you do not have a primary care provider, please call [phone] and they will assist you.        Care EveryWhere ID     This is your Care EveryWhere ID. This could be used by other organizations to access your [facility] medical records  [id number]        Your Vitals Were     Pulse Temperature Pulse Oximetry BMI (Body Mass Index)          94 97.5  F (36.4  C) (Temporal) 97% 30.73 kg/m2         Blood Pressure from Last 3 Encounters:   03/31/17 126/76   03/03/17 (!) 156/94   01/10/17 144/86    Weight from Last 3 Encounters:   03/31/17 179 lb (81.2 kg)   03/03/17 174 lb (78.9 kg)   01/10/17 180 lb  (81.6 kg)              Today, you had the following     No orders found for display       Primary Care Provider Office Phone # Fax #    Dheeraj Jj [phone]       [facility] [address] [place] [address]        Thank you!     Thank you for choosing [facility]  for your care. Our goal is always to provide you with excellent care. Hearing back from our patients is one way we can continue to improve our services. Please take a few minutes to complete the written survey that you may receive in the mail after your visit with us. Thank you!             Your Updated Medication List - Protect others around you: Learn how to safely use, store and throw away your medicines at www.disposemymeds.org.          This list is accurate as of: 3/31/17  2:17 PM.  Always use your most recent med list.                   Brand Name Dispense Instructions for use    ASPIRIN NOT PRESCRIBED    INTENTIONAL    0 each    Antiplatelet medication not prescribed intentionally due to Current anticoagulant therapy (warfarin/enoxaparin)       calcium carbonate-vitamin D 500-400 MG-UNIT Tabs per tablet     180 tablet    Take 1 tablet by mouth 3 times daily       ferrous sulfate 325 (65 FE) MG tablet    IRON    100 tablet    Take 1 tablet (325 mg) by mouth daily (with breakfast)       gabapentin 300 MG capsule    NEURONTIN    180 capsule    Take 1 capsule (300 mg) by mouth 2 times daily       glipiZIDE 10 MG 24 hr tablet    glipiZIDE XL    90 tablet    Take 1 tablet (10 mg) by mouth daily       isosorbide mononitrate 30 MG 24 hr tablet    IMDUR    30 tablet    Take 3 tablets (90 mg) by mouth daily       LASIX 40 MG tablet   Generic drug:  furosemide     90 tablet    40 mg a day       metoprolol 100 MG 24 hr tablet    TOPROL-XL    90 tablet    Take 1.5 tablets (150 mg) by mouth daily       nitroglycerin 0.4 MG sublingual tablet    NITROSTAT    25 tablet    Place 1 tablet under the tongue See  Admin Instructions. for chest pain       order for DME     1 Device    Equipment being ordered: cpap machine, mask, humidifier, tubing and filters       pramipexole 1 MG tablet    MIRAPEX    270 tablet    TAKE 3 TABLETS BY MOUTH EVERY EVENING       simvastatin 40 MG tablet    ZOCOR    90 tablet    TAKE ONE TABLET BY MOUTH EVERY NIGHT AT BEDTIME (NEEDS TO SCHEDULE FASTING LABS BEFORE ADDITIONAL REFILLS)       warfarin 2.5 MG tablet    COUMADIN    50 tablet    Take 2.5 mg (1 tablet) on Tuesday, Thursday, Saturday and 5 mg (2 tablets) all other days of the week, or as directed by the coumadin clinic

## 2017-03-31 NOTE — PROGRESS NOTES
ANTICOAGULATION FOLLOW-UP CLINIC VISIT    Patient Name:  Kathryn Banks  Date:  3/31/2017  Contact Type:  Telephone     SUBJECTIVE:        OBJECTIVE    INR Point of Care   Date Value Ref Range Status   03/31/2017 1.6 (H) 0.86 - 1.14 Final     Comment:     This test is intended for monitoring Coumadin therapy.  Results are not   accurate   in patients with prolonged INR due to factor deficiency.         ASSESSMENT / PLAN  INR assessment SUB    Recheck INR In: 3 DAYS    INR Location Outside lab      Anticoagulation Summary as of 3/31/2017     INR goal 2.0-3.0   Today's INR 1.6!   Maintenance plan No maintenance plan   Full instructions 3/31: 5 mg; 4/1: 5 mg; 4/2: 5 mg   Plan last modified Zo Norwood RN (3/31/2017)   Next INR check 4/3/2017   Target end date     Indications   Long-term (current) use of anticoagulants [Z79.01] [Z79.01]  Atrial fibrillation (H) (Resolved) [I48.91]  Paroxysmal atrial fibrillation (H) [I48.0]         Anticoagulation Episode Summary     INR check location     Preferred lab     Send INR reminders to [facility]ANDI    Comments 2.5 mg tablets, pm dose. Alere home monitor      Anticoagulation Care Providers     Provider Role Specialty Phone number    Dheeraj Jj MD [facility] Internal Medicine [phone]            See the Encounter Report to view Anticoagulation Flowsheet and Dosing Calendar (Go to Encounters tab in chart review, and find the Anticoagulation Therapy Visit)    Dosage adjustment made based on physician directed care plan.    Spoke with pt's - he will tell her to take 5 mg Fri, Sat, Sun and recheck on Monday   ROSARIO Mckeon RN

## 2017-04-04 ENCOUNTER — ANTICOAGULATION THERAPY VISIT (OUTPATIENT)
Dept: ANTICOAGULATION | Facility: CLINIC | Age: 77
End: 2017-04-04

## 2017-04-04 ENCOUNTER — TELEPHONE (OUTPATIENT)
Dept: INTERNAL MEDICINE | Facility: CLINIC | Age: 77
End: 2017-04-04

## 2017-04-04 DIAGNOSIS — I48.0 PAROXYSMAL ATRIAL FIBRILLATION (H): ICD-10-CM

## 2017-04-04 DIAGNOSIS — Z79.01 LONG-TERM (CURRENT) USE OF ANTICOAGULANTS: ICD-10-CM

## 2017-04-04 LAB — INR PPP: 1.5

## 2017-04-04 NOTE — PROGRESS NOTES
ANTICOAGULATION FOLLOW-UP CLINIC VISIT    Patient Name:  Kathryn Banks  Date:  4/4/2017  Contact Type:  Telephone    SUBJECTIVE:     Patient Findings     Comments Reason for Call: INR     Who is calling? Home Care: [facility]     Phone number: [phone]     Fax number:      Name of caller: [facility]     INR Value: 1.5     Are there any other concerns: No     Can we leave a detailed message on this number? YES           OBJECTIVE    INR   Date Value Ref Range Status   04/04/2017 1.5  Final       ASSESSMENT / PLAN  INR assessment SUB    Recheck INR In: 3 DAYS    INR Location Homecare INR      Anticoagulation Summary as of 4/4/2017     INR goal 2.0-3.0   Today's INR 1.5!   Maintenance plan No maintenance plan   Full instructions 4/4: 7.5 mg; 4/5: 7.5 mg; 4/6: 5 mg   Plan last modified Zo Norwood RN (3/31/2017)   Next INR check 4/7/2017   Target end date     Indications   Long-term (current) use of anticoagulants [Z79.01] [Z79.01]  Atrial fibrillation (H) (Resolved) [I48.91]  Paroxysmal atrial fibrillation (H) [I48.0]         Anticoagulation Episode Summary     INR check location     Preferred lab     Send INR reminders to [facility] POOL    Comments 2.5 mg tablets, pm dose. Alere home monitor      Anticoagulation Care Providers     Provider Role Specialty Phone number    Dheeraj Jj MD [facility] Internal Medicine [phone]            See the Encounter Report to view Anticoagulation Flowsheet and Dosing Calendar (Go to Encounters tab in chart review, and find the Anticoagulation Therapy Visit)    Dosage adjustment made based on physician directed care plan.    Nicki Calvillo RN

## 2017-04-04 NOTE — TELEPHONE ENCOUNTER
Reason for Call:  INR    Who is calling?  Home Care: [facility]    Phone number:  [phone]    Fax number:      Name of caller: [facility]    INR Value:  1.5    Are there any other concerns:  No    Can we leave a detailed message on this number? YES    Phone number patient can be reached at: Home number on file [phone] (home)      Call taken on 4/4/2017 at 12:21 PM by Sienna Lafleur

## 2017-04-04 NOTE — MR AVS SNAPSHOT
Kathryn MICKY Banks   4/4/2017   Anticoagulation Therapy Visit    Description:  77 year old female   Provider:  Dheeraj Jj MD   Department:  Ph Anticoag           INR as of 4/4/2017     Today's INR 1.5!      Anticoagulation Summary as of 4/4/2017     INR goal 2.0-3.0   Today's INR 1.5!   Full instructions 4/4: 7.5 mg; 4/5: 7.5 mg; 4/6: 5 mg   Next INR check 4/7/2017    Indications   Long-term (current) use of anticoagulants [Z79.01] [Z79.01]  Atrial fibrillation (H) (Resolved) [I48.91]  Paroxysmal atrial fibrillation (H) [I48.0]         Contact Numbers     Clinic Number:         April 2017 Details    Sun Mon Tue Wed Thu Fri Sat           1                 2               3               4      7.5 mg   See details      5      7.5 mg         6      5 mg         7            8                 9               10               11               12               13               14               15                 16               17               18               19               20               21               22                 23               24               25               26               27               28               29                 30                      Date Details   04/04 This INR check       Date of next INR:  4/7/2017         How to take your warfarin dose     To take:  5 mg Take 2 of the 2.5 mg tablets.    To take:  7.5 mg Take 3 of the 2.5 mg tablets.

## 2017-04-07 ENCOUNTER — ANTICOAGULATION THERAPY VISIT (OUTPATIENT)
Dept: ANTICOAGULATION | Facility: CLINIC | Age: 77
End: 2017-04-07

## 2017-04-07 ENCOUNTER — TELEPHONE (OUTPATIENT)
Dept: INTERNAL MEDICINE | Facility: CLINIC | Age: 77
End: 2017-04-07

## 2017-04-07 DIAGNOSIS — Z79.01 LONG-TERM (CURRENT) USE OF ANTICOAGULANTS: ICD-10-CM

## 2017-04-07 DIAGNOSIS — I48.0 PAROXYSMAL ATRIAL FIBRILLATION (H): ICD-10-CM

## 2017-04-07 LAB — INR PPP: 4

## 2017-04-07 NOTE — TELEPHONE ENCOUNTER
Reason for Call:  INR    Who is calling?  Home Care:     Phone number:  [phone]        Name of caller: [facility]    INR Value:  4.0 was checked twice    Are there any other concerns:  No, home care is requesting to call as soon as possible    Can we leave a detailed message on this number? YES    Phone number patient can be reached at: Other phone number:  [phone]      Call taken on 4/7/2017 at 1:52 PM by Josephine Tejeda

## 2017-04-07 NOTE — PROGRESS NOTES
ANTICOAGULATION FOLLOW-UP CLINIC VISIT    Patient Name:  Kathryn Banks  Date:  4/7/2017  Contact Type:  Telephone/ [facility] nurse    SUBJECTIVE:     Patient Findings     Positives Med error (pt took 7.5 mg Tues, Wed, Thurs instead of just Tues and Wed)           OBJECTIVE    INR   Date Value Ref Range Status   04/07/2017 4.0  Final       ASSESSMENT / PLAN  INR assessment SUPRA    Recheck INR In: 4 DAYS    INR Location [facility]      Anticoagulation Summary as of 4/7/2017     INR goal 2.0-3.0   Today's INR 4.0!   Maintenance plan No maintenance plan   Full instructions 4/7: 2.5 mg; 4/8: 2.5 mg; 4/9: 5 mg; 4/10: 5 mg   Plan last modified Zo Norwood RN (3/31/2017)   Next INR check 4/11/2017   Target end date     Indications   Long-term (current) use of anticoagulants [Z79.01] [Z79.01]  Atrial fibrillation (H) (Resolved) [I48.91]  Paroxysmal atrial fibrillation (H) [I48.0]         Anticoagulation Episode Summary     INR check location     Preferred lab     Send INR reminders to [facility] POOL    Comments 2.5 mg tablets, pm dose. Alere home monitor      Anticoagulation Care Providers     Provider Role Specialty Phone number    Dheeraj Jj MD [facility] [phone]            See the Encounter Report to view Anticoagulation Flowsheet and Dosing Calendar (Go to Encounters tab in chart review, and find the Anticoagulation Therapy Visit)    Dosage adjustment made based on physician directed care plan.    Pt took 7.5 mg Tues, Wed, Thurs instead of 7.5 mg Tues, Wed and 5 mg Thurs    Zo Norwood, RN

## 2017-04-07 NOTE — MR AVS SNAPSHOT
Kathryn MICKY Banks   4/7/2017   Anticoagulation Therapy Visit    Description:  77 year old female   Provider:  Dheeraj Jj MD   Department:  Ph Anticoag           INR as of 4/7/2017     Today's INR 4.0!      Anticoagulation Summary as of 4/7/2017     INR goal 2.0-3.0   Today's INR 4.0!   Full instructions 4/7: 2.5 mg; 4/8: 2.5 mg; 4/9: 5 mg; 4/10: 5 mg   Next INR check 4/11/2017    Indications   Long-term (current) use of anticoagulants [Z79.01] [Z79.01]  Atrial fibrillation (H) (Resolved) [I48.91]  Paroxysmal atrial fibrillation (H) [I48.0]         Contact Numbers     Clinic Number:         April 2017 Details    Sun Mon Tue Wed Thu Fri Sat           1                 2               3               4               5               6               7      2.5 mg   See details      8      2.5 mg           9      5 mg         10      5 mg         11            12               13               14               15                 16               17               18               19               20               21               22                 23               24               25               26               27               28               29                 30                      Date Details   04/07 This INR check       Date of next INR:  4/11/2017         How to take your warfarin dose     To take:  2.5 mg Take 1 of the 2.5 mg tablets.    To take:  5 mg Take 2 of the 2.5 mg tablets.

## 2017-04-11 ENCOUNTER — ANTICOAGULATION THERAPY VISIT (OUTPATIENT)
Dept: ANTICOAGULATION | Facility: CLINIC | Age: 77
End: 2017-04-11

## 2017-04-11 ENCOUNTER — TELEPHONE (OUTPATIENT)
Dept: INTERNAL MEDICINE | Facility: CLINIC | Age: 77
End: 2017-04-11

## 2017-04-11 DIAGNOSIS — Z79.01 LONG-TERM (CURRENT) USE OF ANTICOAGULANTS: ICD-10-CM

## 2017-04-11 DIAGNOSIS — I48.0 PAROXYSMAL ATRIAL FIBRILLATION (H): ICD-10-CM

## 2017-04-11 LAB — INR PPP: 2.6

## 2017-04-11 NOTE — MR AVS SNAPSHOT
Kathryn MICKY Banks   4/11/2017   Anticoagulation Therapy Visit    Description:  77 year old female   Provider:  Dheeraj Jj MD   Department:   Anticoag           INR as of 4/11/2017     Today's INR 2.6      Anticoagulation Summary as of 4/11/2017     INR goal 2.0-3.0   Today's INR 2.6   Full instructions 7.5 mg on Fri; 5 mg all other days   Next INR check 4/18/2017    Indications   Long-term (current) use of anticoagulants [Z79.01] [Z79.01]  Atrial fibrillation (H) (Resolved) [I48.91]  Paroxysmal atrial fibrillation (H) [I48.0]         Contact Numbers     Clinic Number:         April 2017 Details    Sun Mon Tue Wed Thu Fri Sat           1                 2               3               4               5               6               7               8                 9               10               11      5 mg   See details      12      5 mg         13      5 mg         14      7.5 mg         15      5 mg           16      5 mg         17      5 mg         18            19               20               21               22                 23               24               25               26               27               28               29                 30                      Date Details   04/11 This INR check       Date of next INR:  4/18/2017         How to take your warfarin dose     To take:  5 mg Take 2 of the 2.5 mg tablets.    To take:  7.5 mg Take 3 of the 2.5 mg tablets.

## 2017-04-11 NOTE — TELEPHONE ENCOUNTER
Reason for Call:  INR    Who is calling?  Home Care:     Phone number:  [phone]    Fax number:      Name of caller: Janine    INR Value:  2.6    Are there any other concerns:  No    Can we leave a detailed message on this number? YES    Phone number patient can be reached at: Other phone number:  [phone]      Call taken on 4/11/2017 at 9:31 AM by Josephine Tejeda

## 2017-04-11 NOTE — PROGRESS NOTES
ANTICOAGULATION FOLLOW-UP CLINIC VISIT    Patient Name:  Kathryn Banks  Date:  4/11/2017  Contact Type:  Telephone    SUBJECTIVE:     Patient Findings     Positives No Problem Findings    Comments Reason for Call: INR     Who is calling? Home Care:      Phone number: [phone]     Fax number:      Name of caller: Janine     INR Value: 2.6     Are there any other concerns: No     Can we leave a detailed message on this number? YES           OBJECTIVE    INR   Date Value Ref Range Status   04/11/2017 2.6  Final       ASSESSMENT / PLAN  INR assessment THER    Recheck INR In: 1 WEEK    INR Location [facility] INR      Anticoagulation Summary as of 4/11/2017     INR goal 2.0-3.0   Today's INR 2.6   Maintenance plan 7.5 mg (2.5 mg x 3) on Fri; 5 mg (2.5 mg x 2) all other days   Full instructions 7.5 mg on Fri; 5 mg all other days   Weekly total 37.5 mg   Plan last modified Nicki Calvillo RN (4/11/2017)   Next INR check 4/18/2017   Target end date     Indications   Long-term (current) use of anticoagulants [Z79.01] [Z79.01]  Atrial fibrillation (H) (Resolved) [I48.91]  Paroxysmal atrial fibrillation (H) [I48.0]         Anticoagulation Episode Summary     INR check location     Preferred lab     Send INR reminders to [facility] RANDI    Comments 2.5 mg tablets, pm dose. Alere home monitor      Anticoagulation Care Providers     Provider Role Specialty Phone number    Dheeraj Jj MD [facility] Internal Medicine [phone]            See the Encounter Report to view Anticoagulation Flowsheet and Dosing Calendar (Go to Encounters tab in chart review, and find the Anticoagulation Therapy Visit)    Dosage adjustment made based on physician directed care plan.    Nicki Calvillo, ROSARIO

## 2017-04-18 ENCOUNTER — TELEPHONE (OUTPATIENT)
Dept: FAMILY MEDICINE | Facility: CLINIC | Age: 77
End: 2017-04-18

## 2017-04-18 ENCOUNTER — ANTICOAGULATION THERAPY VISIT (OUTPATIENT)
Dept: ANTICOAGULATION | Facility: CLINIC | Age: 77
End: 2017-04-18

## 2017-04-18 DIAGNOSIS — Z79.01 LONG-TERM (CURRENT) USE OF ANTICOAGULANTS: ICD-10-CM

## 2017-04-18 DIAGNOSIS — I48.0 PAROXYSMAL ATRIAL FIBRILLATION (H): ICD-10-CM

## 2017-04-18 LAB — INR PPP: 1.9

## 2017-04-18 NOTE — MR AVS SNAPSHOT
Kathryn Banks   4/18/2017   Anticoagulation Therapy Visit    Description:  77 year old female   Provider:  Dheeraj Jj MD   Department:  Ph Anticoag           INR as of 4/18/2017     Today's INR 1.9!      Anticoagulation Summary as of 4/18/2017     INR goal 2.0-3.0   Today's INR 1.9!   Full instructions 7.5 mg on Fri; 5 mg all other days   Next INR check 4/25/2017    Indications   Long-term (current) use of anticoagulants [Z79.01] [Z79.01]  Atrial fibrillation (H) (Resolved) [I48.91]  Paroxysmal atrial fibrillation (H) [I48.0]         Contact Numbers     Clinic Number:         April 2017 Details    Sun Mon Tue Wed Thu Fri Sat           1                 2               3               4               5               6               7               8                 9               10               11               12               13               14               15                 16               17               18      5 mg   See details      19      5 mg         20      5 mg         21      7.5 mg         22      5 mg           23      5 mg         24      5 mg         25            26               27               28               29                 30                      Date Details   04/18 This INR check       Date of next INR:  4/25/2017         How to take your warfarin dose     To take:  5 mg Take 2 of the 2.5 mg tablets.    To take:  7.5 mg Take 3 of the 2.5 mg tablets.

## 2017-04-18 NOTE — PROGRESS NOTES
ANTICOAGULATION FOLLOW-UP CLINIC VISIT    Patient Name:  Kathryn Banks  Date:  4/18/2017  Contact Type:  Telephone/ [facility] nurse    SUBJECTIVE:     Patient Findings     Positives No Problem Findings    Comments Reason for Call: INR     Who is calling? Home Care: [facility]. At patients house and will need to set up medication. Requesting a call back as soon as possible.      Phone number: [phone]     Fax number:      Name of caller: Leona      INR Value: 1.9     Are there any other concerns: No     Can we leave a detailed message on this number? YES     Phone number patient can be reached at: Other phone number: [phone]     Call taken on 4/18/2017 at 1:05 PM by Crystal Hernandez           OBJECTIVE    INR   Date Value Ref Range Status   04/18/2017 1.9  Final       ASSESSMENT / PLAN  INR assessment THER    Recheck INR In: 1 WEEK    INR Location [facility]R      Anticoagulation Summary as of 4/18/2017     INR goal 2.0-3.0   Today's INR 1.9!   Maintenance plan 7.5 mg (2.5 mg x 3) on Fri; 5 mg (2.5 mg x 2) all other days   Full instructions 7.5 mg on Fri; 5 mg all other days   Weekly total 37.5 mg   No change documented Zo Norwood, ROSARIO   Plan last modified Nicki Calvillo, RN (4/11/2017)   Next INR check 4/25/2017   Target end date     Indications   Long-term (current) use of anticoagulants [Z79.01] [Z79.01]  Atrial fibrillation (H) (Resolved) [I48.91]  Paroxysmal atrial fibrillation (H) [I48.0]         Anticoagulation Episode Summary     INR check location     Preferred lab     Send INR reminders to [facility]    Comments 2.5 mg tablets, pm dose. Alere home monitor      Anticoagulation Care Providers     Provider Role Specialty Phone number    Dheeraj Jj MD [facility] Internal Medicine [phone]            See the Encounter Report to view Anticoagulation Flowsheet and Dosing Calendar (Go to Encounters tab in chart review, and find the Anticoagulation Therapy Visit)    Dosage  adjustment made based on physician directed care plan.        Zo Norwood RN

## 2017-04-18 NOTE — TELEPHONE ENCOUNTER
Reason for Call:  INR    Who is calling?  Home Care: [facility]. At patients house and will need to set up medication. Requesting a call back as soon as possible.     Phone number:  [phone]    Fax number:       Name of caller: Leona     INR Value:  1.9    Are there any other concerns:  No    Can we leave a detailed message on this number? YES    Phone number patient can be reached at: Other phone number: [phone]    Call taken on 4/18/2017 at 1:05 PM by Crystal Hernandez

## 2017-04-19 DIAGNOSIS — Z79.01 LONG-TERM (CURRENT) USE OF ANTICOAGULANTS: ICD-10-CM

## 2017-04-19 DIAGNOSIS — I48.0 PAROXYSMAL ATRIAL FIBRILLATION (H): ICD-10-CM

## 2017-04-19 RX ORDER — WARFARIN SODIUM 2.5 MG/1
TABLET ORAL
Qty: 180 TABLET | Refills: 0 | Status: ON HOLD | OUTPATIENT
Start: 2017-04-19 | End: 2017-05-02

## 2017-04-19 NOTE — TELEPHONE ENCOUNTER
Warfarin    Last Written Prescription Date: 1/13/2017  Last Fill Qty: 50, # refills: 0  Last Office Visit with G, P or [facility] prescribing provider: 3/31/2017       Date and Result of Last PT/INR:   Lab Results   Component Value Date    INR 1.9 04/18/2017    INR 2.6 04/11/2017    PT 74.1 05/16/2014

## 2017-04-26 ENCOUNTER — ANTICOAGULATION THERAPY VISIT (OUTPATIENT)
Dept: ANTICOAGULATION | Facility: CLINIC | Age: 77
End: 2017-04-26

## 2017-04-26 ENCOUNTER — TELEPHONE (OUTPATIENT)
Dept: INTERNAL MEDICINE | Facility: CLINIC | Age: 77
End: 2017-04-26

## 2017-04-26 DIAGNOSIS — I48.0 PAROXYSMAL ATRIAL FIBRILLATION (H): ICD-10-CM

## 2017-04-26 DIAGNOSIS — Z79.01 LONG-TERM (CURRENT) USE OF ANTICOAGULANTS: ICD-10-CM

## 2017-04-26 LAB — INR PPP: 2

## 2017-04-26 NOTE — TELEPHONE ENCOUNTER
Reason for Call:  Other needs form    Detailed comments: patient needs Dr. Jj to fill out Organization of determination form for getting a home INR monitoring meter, and fax to [facility] , If questions please call patient    Phone Number Patient can be reached at: Home number on file [phone] (home)    Best Time: any    Can we leave a detailed message on this number? YES    Call taken on 4/26/2017 at 10:10 AM by Tabitha Bland

## 2017-04-26 NOTE — TELEPHONE ENCOUNTER
Will send to coumadin clinic to get there opinion about this.  People I have talked to don't know what form this is or what to do with this request.

## 2017-04-26 NOTE — PROGRESS NOTES
ANTICOAGULATION FOLLOW-UP CLINIC VISIT    Patient Name:  Kathryn Banks  Date:  4/26/2017  Contact Type:  Telephone/ [facility] nurse    SUBJECTIVE:     Patient Findings     Positives No Problem Findings           OBJECTIVE    INR   Date Value Ref Range Status   04/26/2017 2.0  Final       ASSESSMENT / PLAN  INR assessment THER    Recheck INR In: 2 WEEKS    INR Location [facility]R      Anticoagulation Summary as of 4/26/2017     INR goal 2.0-3.0   Today's INR 2.0   Maintenance plan 7.5 mg (2.5 mg x 3) on Fri; 5 mg (2.5 mg x 2) all other days   Full instructions 7.5 mg on Fri; 5 mg all other days   Weekly total 37.5 mg   No change documented Zo Norwood RN   Plan last modified Nicki Calvillo RN (4/11/2017)   Next INR check 5/10/2017   Target end date     Indications   Long-term (current) use of anticoagulants [Z79.01] [Z79.01]  Atrial fibrillation (H) (Resolved) [I48.91]  Paroxysmal atrial fibrillation (H) [I48.0]         Anticoagulation Episode Summary     INR check location     Preferred lab     Send INR reminders to [facility] POOL    Comments 2.5 mg tablets, pm dose. Alere home monitor      Anticoagulation Care Providers     Provider Role Specialty Phone number    Dheeraj Jj MD [facility] Internal Medicine [phone]            See the Encounter Report to view Anticoagulation Flowsheet and Dosing Calendar (Go to Encounters tab in chart review, and find the Anticoagulation Therapy Visit)    Dosage adjustment made based on physician directed care plan.      Zo Norwood, RN

## 2017-04-26 NOTE — MR AVS SNAPSHOT
Kathryn MICKY Banks   4/26/2017   Anticoagulation Therapy Visit    Description:  77 year old female   Provider:  Dheeraj Jj MD   Department:  Ph Anticoag           INR as of 4/26/2017     Today's INR 2.0      Anticoagulation Summary as of 4/26/2017     INR goal 2.0-3.0   Today's INR 2.0   Full instructions 7.5 mg on Fri; 5 mg all other days   Next INR check 5/10/2017    Indications   Long-term (current) use of anticoagulants [Z79.01] [Z79.01]  Atrial fibrillation (H) (Resolved) [I48.91]  Paroxysmal atrial fibrillation (H) [I48.0]         Contact Numbers     Clinic Number:         April 2017 Details    Sun Mon Tue Wed Thu Fri Sat           1                 2               3               4               5               6               7               8                 9               10               11               12               13               14               15                 16               17               18               19               20               21               22                 23               24               25               26      5 mg   See details      27      5 mg         28      7.5 mg         29      5 mg           30      5 mg                Date Details   04/26 This INR check               How to take your warfarin dose     To take:  5 mg Take 2 of the 2.5 mg tablets.    To take:  7.5 mg Take 3 of the 2.5 mg tablets.           May 2017 Details    Sun Mon Tue Wed Thu Fri Sat      1      5 mg         2      5 mg         3      5 mg         4      5 mg         5      7.5 mg         6      5 mg           7      5 mg         8      5 mg         9      5 mg         10            11               12               13                 14               15               16               17               18               19               20                 21               22               23               24               25               26               27                 28                29               30               31                   Date Details   No additional details    Date of next INR:  5/10/2017         How to take your warfarin dose     To take:  5 mg Take 2 of the 2.5 mg tablets.    To take:  7.5 mg Take 3 of the 2.5 mg tablets.

## 2017-04-28 ENCOUNTER — CARE COORDINATION (OUTPATIENT)
Dept: CARE COORDINATION | Facility: CLINIC | Age: 77
End: 2017-04-28

## 2017-04-28 ENCOUNTER — APPOINTMENT (OUTPATIENT)
Dept: ULTRASOUND IMAGING | Facility: CLINIC | Age: 77
DRG: 871 | End: 2017-04-28
Attending: EMERGENCY MEDICINE
Payer: MEDICARE

## 2017-04-28 ENCOUNTER — APPOINTMENT (OUTPATIENT)
Dept: CT IMAGING | Facility: CLINIC | Age: 77
DRG: 871 | End: 2017-04-28
Attending: EMERGENCY MEDICINE
Payer: MEDICARE

## 2017-04-28 ENCOUNTER — APPOINTMENT (OUTPATIENT)
Dept: GENERAL RADIOLOGY | Facility: CLINIC | Age: 77
DRG: 871 | End: 2017-04-28
Attending: EMERGENCY MEDICINE
Payer: MEDICARE

## 2017-04-28 ENCOUNTER — HOSPITAL ENCOUNTER (INPATIENT)
Facility: CLINIC | Age: 77
LOS: 4 days | Discharge: NURSING, REHAB OR LONG-TERM CARE | DRG: 871 | End: 2017-05-02
Attending: EMERGENCY MEDICINE | Admitting: FAMILY MEDICINE
Payer: MEDICARE

## 2017-04-28 DIAGNOSIS — Z79.01 LONG-TERM (CURRENT) USE OF ANTICOAGULANTS: ICD-10-CM

## 2017-04-28 DIAGNOSIS — I89.0 LYMPHEDEMA: ICD-10-CM

## 2017-04-28 DIAGNOSIS — E11.9 TYPE 2 DIABETES, HBA1C GOAL < 8% (H): ICD-10-CM

## 2017-04-28 DIAGNOSIS — I10 HYPERTENSION GOAL BP (BLOOD PRESSURE) < 140/90: ICD-10-CM

## 2017-04-28 DIAGNOSIS — E78.5 HYPERLIPIDEMIA LDL GOAL <100: ICD-10-CM

## 2017-04-28 DIAGNOSIS — E11.59 TYPE 2 DIABETES MELLITUS WITH OTHER CIRCULATORY COMPLICATIONS (H): Primary | ICD-10-CM

## 2017-04-28 DIAGNOSIS — N39.0 URINARY TRACT INFECTION WITHOUT HEMATURIA, SITE UNSPECIFIED: ICD-10-CM

## 2017-04-28 DIAGNOSIS — I48.91 ATRIAL FIBRILLATION WITH RAPID VENTRICULAR RESPONSE (H): ICD-10-CM

## 2017-04-28 DIAGNOSIS — I25.2 HX OF NON-ST ELEVATION MYOCARDIAL INFARCTION (NSTEMI): ICD-10-CM

## 2017-04-28 DIAGNOSIS — I48.91 ATRIAL FIBRILLATION, UNSPECIFIED TYPE (H): ICD-10-CM

## 2017-04-28 DIAGNOSIS — I48.92 ATRIAL FLUTTER, UNSPECIFIED TYPE (H): ICD-10-CM

## 2017-04-28 DIAGNOSIS — M79.89 SWELLING OF LIMB: ICD-10-CM

## 2017-04-28 DIAGNOSIS — G25.81 RESTLESS LEGS SYNDROME (RLS): ICD-10-CM

## 2017-04-28 DIAGNOSIS — I48.0 PAROXYSMAL ATRIAL FIBRILLATION (H): ICD-10-CM

## 2017-04-28 DIAGNOSIS — N10 ACUTE PYELONEPHRITIS: ICD-10-CM

## 2017-04-28 PROBLEM — A41.9 SEPSIS (H): Status: ACTIVE | Noted: 2017-04-28

## 2017-04-28 PROBLEM — N30.00 ACUTE CYSTITIS WITHOUT HEMATURIA: Status: ACTIVE | Noted: 2017-04-28

## 2017-04-28 LAB
ALBUMIN SERPL-MCNC: 2.6 G/DL (ref 3.4–5)
ALBUMIN UR-MCNC: 30 MG/DL
ALP SERPL-CCNC: 242 U/L (ref 40–150)
ALT SERPL W P-5'-P-CCNC: 23 U/L (ref 0–50)
ANION GAP SERPL CALCULATED.3IONS-SCNC: 10 MMOL/L (ref 3–14)
APPEARANCE UR: ABNORMAL
AST SERPL W P-5'-P-CCNC: 13 U/L (ref 0–45)
BACTERIA #/AREA URNS HPF: ABNORMAL /HPF
BASE DEFICIT BLDV-SCNC: 11.9 MMOL/L
BASOPHILS # BLD AUTO: 0 10E9/L (ref 0–0.2)
BASOPHILS NFR BLD AUTO: 0.1 %
BILIRUB SERPL-MCNC: 0.3 MG/DL (ref 0.2–1.3)
BILIRUB UR QL STRIP: NEGATIVE
BUN SERPL-MCNC: 35 MG/DL (ref 7–30)
CALCIUM SERPL-MCNC: 7.9 MG/DL (ref 8.5–10.1)
CHLORIDE SERPL-SCNC: 117 MMOL/L (ref 94–109)
CO2 SERPL-SCNC: 16 MMOL/L (ref 20–32)
COLOR UR AUTO: YELLOW
CREAT SERPL-MCNC: 1.44 MG/DL (ref 0.52–1.04)
DIFFERENTIAL METHOD BLD: ABNORMAL
EOSINOPHIL # BLD AUTO: 0 10E9/L (ref 0–0.7)
EOSINOPHIL NFR BLD AUTO: 0 %
ERYTHROCYTE [DISTWIDTH] IN BLOOD BY AUTOMATED COUNT: 18.9 % (ref 10–15)
GFR SERPL CREATININE-BSD FRML MDRD: 35 ML/MIN/1.7M2
GLUCOSE BLDC GLUCOMTR-MCNC: 187 MG/DL (ref 70–99)
GLUCOSE SERPL-MCNC: 190 MG/DL (ref 70–99)
GLUCOSE UR STRIP-MCNC: NEGATIVE MG/DL
HCO3 BLDV-SCNC: 15 MMOL/L (ref 21–28)
HCT VFR BLD AUTO: 36.7 % (ref 35–47)
HGB BLD-MCNC: 10.7 G/DL (ref 11.7–15.7)
HGB UR QL STRIP: ABNORMAL
IMM GRANULOCYTES # BLD: 0.1 10E9/L (ref 0–0.4)
IMM GRANULOCYTES NFR BLD: 0.3 %
INR PPP: 3.07 (ref 0.86–1.14)
KETONES UR STRIP-MCNC: NEGATIVE MG/DL
LACTATE BLD-SCNC: 1.1 MMOL/L (ref 0.7–2.1)
LEUKOCYTE ESTERASE UR QL STRIP: ABNORMAL
LYMPHOCYTES # BLD AUTO: 0.6 10E9/L (ref 0.8–5.3)
LYMPHOCYTES NFR BLD AUTO: 2.8 %
MCH RBC QN AUTO: 23.8 PG (ref 26.5–33)
MCHC RBC AUTO-ENTMCNC: 29.2 G/DL (ref 31.5–36.5)
MCV RBC AUTO: 82 FL (ref 78–100)
MONOCYTES # BLD AUTO: 1.2 10E9/L (ref 0–1.3)
MONOCYTES NFR BLD AUTO: 5.4 %
MUCOUS THREADS #/AREA URNS LPF: PRESENT /LPF
NEUTROPHILS # BLD AUTO: 20.5 10E9/L (ref 1.6–8.3)
NEUTROPHILS NFR BLD AUTO: 91.4 %
NITRATE UR QL: POSITIVE
O2/TOTAL GAS SETTING VFR VENT: 21 %
PCO2 BLDV: 40 MM HG (ref 40–50)
PH BLDV: 7.19 PH (ref 7.32–7.43)
PH UR STRIP: 5 PH (ref 5–7)
PLATELET # BLD AUTO: 283 10E9/L (ref 150–450)
PO2 BLDV: 25 MM HG (ref 25–47)
POTASSIUM SERPL-SCNC: 4.5 MMOL/L (ref 3.4–5.3)
PROT SERPL-MCNC: 5.9 G/DL (ref 6.8–8.8)
RBC # BLD AUTO: 4.5 10E12/L (ref 3.8–5.2)
RBC #/AREA URNS AUTO: 9 /HPF (ref 0–2)
SODIUM SERPL-SCNC: 143 MMOL/L (ref 133–144)
SP GR UR STRIP: 1.02 (ref 1–1.03)
SQUAMOUS #/AREA URNS AUTO: <1 /HPF (ref 0–1)
TROPONIN I SERPL-MCNC: 0.03 UG/L (ref 0–0.04)
TSH SERPL DL<=0.005 MIU/L-ACNC: 0.75 MU/L (ref 0.4–4)
URN SPEC COLLECT METH UR: ABNORMAL
UROBILINOGEN UR STRIP-MCNC: 0 MG/DL (ref 0–2)
WBC # BLD AUTO: 22.4 10E9/L (ref 4–11)
WBC #/AREA URNS AUTO: 98 /HPF (ref 0–2)

## 2017-04-28 PROCEDURE — 99207 ZZC CDG-UP CODE -MED NECESSITY: CPT | Performed by: FAMILY MEDICINE

## 2017-04-28 PROCEDURE — 80053 COMPREHEN METABOLIC PANEL: CPT | Performed by: EMERGENCY MEDICINE

## 2017-04-28 PROCEDURE — A9270 NON-COVERED ITEM OR SERVICE: HCPCS | Mod: GY | Performed by: EMERGENCY MEDICINE

## 2017-04-28 PROCEDURE — 96376 TX/PRO/DX INJ SAME DRUG ADON: CPT | Performed by: EMERGENCY MEDICINE

## 2017-04-28 PROCEDURE — 25800025 ZZH RX 258: Performed by: EMERGENCY MEDICINE

## 2017-04-28 PROCEDURE — 25000128 H RX IP 250 OP 636: Performed by: EMERGENCY MEDICINE

## 2017-04-28 PROCEDURE — 25000132 ZZH RX MED GY IP 250 OP 250 PS 637: Mod: GY | Performed by: EMERGENCY MEDICINE

## 2017-04-28 PROCEDURE — 87081 CULTURE SCREEN ONLY: CPT | Performed by: FAMILY MEDICINE

## 2017-04-28 PROCEDURE — 83605 ASSAY OF LACTIC ACID: CPT | Performed by: EMERGENCY MEDICINE

## 2017-04-28 PROCEDURE — 96365 THER/PROPH/DIAG IV INF INIT: CPT | Performed by: EMERGENCY MEDICINE

## 2017-04-28 PROCEDURE — 00000146 ZZHCL STATISTIC GLUCOSE BY METER IP

## 2017-04-28 PROCEDURE — 71020 XR CHEST 2 VW: CPT | Mod: TC

## 2017-04-28 PROCEDURE — 25000132 ZZH RX MED GY IP 250 OP 250 PS 637: Mod: GY | Performed by: FAMILY MEDICINE

## 2017-04-28 PROCEDURE — 99285 EMERGENCY DEPT VISIT HI MDM: CPT | Mod: 25 | Performed by: EMERGENCY MEDICINE

## 2017-04-28 PROCEDURE — 99223 1ST HOSP IP/OBS HIGH 75: CPT | Mod: AI | Performed by: FAMILY MEDICINE

## 2017-04-28 PROCEDURE — 81001 URINALYSIS AUTO W/SCOPE: CPT | Performed by: EMERGENCY MEDICINE

## 2017-04-28 PROCEDURE — 87040 BLOOD CULTURE FOR BACTERIA: CPT | Performed by: EMERGENCY MEDICINE

## 2017-04-28 PROCEDURE — A9270 NON-COVERED ITEM OR SERVICE: HCPCS | Mod: GY | Performed by: FAMILY MEDICINE

## 2017-04-28 PROCEDURE — 84484 ASSAY OF TROPONIN QUANT: CPT | Performed by: EMERGENCY MEDICINE

## 2017-04-28 PROCEDURE — 93010 ELECTROCARDIOGRAM REPORT: CPT | Mod: Z6 | Performed by: EMERGENCY MEDICINE

## 2017-04-28 PROCEDURE — 93005 ELECTROCARDIOGRAM TRACING: CPT | Mod: 76 | Performed by: EMERGENCY MEDICINE

## 2017-04-28 PROCEDURE — 87086 URINE CULTURE/COLONY COUNT: CPT | Performed by: EMERGENCY MEDICINE

## 2017-04-28 PROCEDURE — 87186 SC STD MICRODIL/AGAR DIL: CPT | Performed by: EMERGENCY MEDICINE

## 2017-04-28 PROCEDURE — 84443 ASSAY THYROID STIM HORMONE: CPT | Performed by: EMERGENCY MEDICINE

## 2017-04-28 PROCEDURE — 25000125 ZZHC RX 250: Performed by: EMERGENCY MEDICINE

## 2017-04-28 PROCEDURE — 85025 COMPLETE CBC W/AUTO DIFF WBC: CPT | Performed by: EMERGENCY MEDICINE

## 2017-04-28 PROCEDURE — 96361 HYDRATE IV INFUSION ADD-ON: CPT | Performed by: EMERGENCY MEDICINE

## 2017-04-28 PROCEDURE — 36415 COLL VENOUS BLD VENIPUNCTURE: CPT | Performed by: EMERGENCY MEDICINE

## 2017-04-28 PROCEDURE — 96367 TX/PROPH/DG ADDL SEQ IV INF: CPT | Performed by: EMERGENCY MEDICINE

## 2017-04-28 PROCEDURE — 93005 ELECTROCARDIOGRAM TRACING: CPT | Performed by: EMERGENCY MEDICINE

## 2017-04-28 PROCEDURE — 20000003 ZZH R&B ICU

## 2017-04-28 PROCEDURE — 76775 US EXAM ABDO BACK WALL LIM: CPT

## 2017-04-28 PROCEDURE — 25000128 H RX IP 250 OP 636: Performed by: FAMILY MEDICINE

## 2017-04-28 PROCEDURE — 85610 PROTHROMBIN TIME: CPT | Performed by: EMERGENCY MEDICINE

## 2017-04-28 PROCEDURE — 96375 TX/PRO/DX INJ NEW DRUG ADDON: CPT | Performed by: EMERGENCY MEDICINE

## 2017-04-28 PROCEDURE — 82803 BLOOD GASES ANY COMBINATION: CPT | Performed by: EMERGENCY MEDICINE

## 2017-04-28 PROCEDURE — 87088 URINE BACTERIA CULTURE: CPT | Performed by: EMERGENCY MEDICINE

## 2017-04-28 PROCEDURE — 70450 CT HEAD/BRAIN W/O DYE: CPT

## 2017-04-28 RX ORDER — CEFAZOLIN SODIUM 1 G/50ML
1250 SOLUTION INTRAVENOUS EVERY 24 HOURS
Status: DISCONTINUED | OUTPATIENT
Start: 2017-04-28 | End: 2017-04-30

## 2017-04-28 RX ORDER — NICOTINE POLACRILEX 4 MG
15-30 LOZENGE BUCCAL
Status: DISCONTINUED | OUTPATIENT
Start: 2017-04-28 | End: 2017-05-02 | Stop reason: HOSPADM

## 2017-04-28 RX ORDER — ACETAMINOPHEN 325 MG/1
650 TABLET ORAL ONCE
Status: COMPLETED | OUTPATIENT
Start: 2017-04-28 | End: 2017-04-28

## 2017-04-28 RX ORDER — ACETAMINOPHEN 325 MG/1
650 TABLET ORAL EVERY 4 HOURS PRN
Status: DISCONTINUED | OUTPATIENT
Start: 2017-04-28 | End: 2017-05-02 | Stop reason: HOSPADM

## 2017-04-28 RX ORDER — ACETAMINOPHEN 500 MG
1000 TABLET ORAL ONCE
Status: COMPLETED | OUTPATIENT
Start: 2017-04-28 | End: 2017-04-28

## 2017-04-28 RX ORDER — HYDROCODONE BITARTRATE AND ACETAMINOPHEN 5; 325 MG/1; MG/1
1 TABLET ORAL EVERY 6 HOURS PRN
Status: DISCONTINUED | OUTPATIENT
Start: 2017-04-28 | End: 2017-04-30

## 2017-04-28 RX ORDER — ONDANSETRON 4 MG/1
4 TABLET, ORALLY DISINTEGRATING ORAL EVERY 6 HOURS PRN
Status: DISCONTINUED | OUTPATIENT
Start: 2017-04-28 | End: 2017-05-02 | Stop reason: HOSPADM

## 2017-04-28 RX ORDER — METOPROLOL TARTRATE 1 MG/ML
5 INJECTION, SOLUTION INTRAVENOUS EVERY 5 MIN PRN
Status: DISCONTINUED | OUTPATIENT
Start: 2017-04-28 | End: 2017-04-28

## 2017-04-28 RX ORDER — SODIUM CHLORIDE 9 MG/ML
1000 INJECTION, SOLUTION INTRAVENOUS CONTINUOUS
Status: DISCONTINUED | OUTPATIENT
Start: 2017-04-28 | End: 2017-04-29

## 2017-04-28 RX ORDER — NITROGLYCERIN 0.4 MG/1
0.4 TABLET SUBLINGUAL SEE ADMIN INSTRUCTIONS
Status: DISCONTINUED | OUTPATIENT
Start: 2017-04-28 | End: 2017-05-02 | Stop reason: HOSPADM

## 2017-04-28 RX ORDER — GABAPENTIN 300 MG/1
300 CAPSULE ORAL 2 TIMES DAILY
Status: DISCONTINUED | OUTPATIENT
Start: 2017-04-28 | End: 2017-05-01

## 2017-04-28 RX ORDER — HYDROMORPHONE HYDROCHLORIDE 1 MG/ML
.3-.5 INJECTION, SOLUTION INTRAMUSCULAR; INTRAVENOUS; SUBCUTANEOUS
Status: DISCONTINUED | OUTPATIENT
Start: 2017-04-28 | End: 2017-04-28

## 2017-04-28 RX ORDER — WARFARIN SODIUM 2.5 MG/1
2.5 TABLET ORAL
Status: COMPLETED | OUTPATIENT
Start: 2017-04-28 | End: 2017-04-28

## 2017-04-28 RX ORDER — HYDROMORPHONE HYDROCHLORIDE 1 MG/ML
0.5 INJECTION, SOLUTION INTRAMUSCULAR; INTRAVENOUS; SUBCUTANEOUS
Status: DISCONTINUED | OUTPATIENT
Start: 2017-04-28 | End: 2017-04-28

## 2017-04-28 RX ORDER — NALOXONE HYDROCHLORIDE 0.4 MG/ML
.1-.4 INJECTION, SOLUTION INTRAMUSCULAR; INTRAVENOUS; SUBCUTANEOUS
Status: DISCONTINUED | OUTPATIENT
Start: 2017-04-28 | End: 2017-05-02 | Stop reason: HOSPADM

## 2017-04-28 RX ORDER — DEXTROSE MONOHYDRATE, SODIUM CHLORIDE, AND POTASSIUM CHLORIDE 50; 1.49; 4.5 G/1000ML; G/1000ML; G/1000ML
INJECTION, SOLUTION INTRAVENOUS CONTINUOUS
Status: DISCONTINUED | OUTPATIENT
Start: 2017-04-28 | End: 2017-04-28

## 2017-04-28 RX ORDER — CEFTRIAXONE 1 G/1
1 INJECTION, POWDER, FOR SOLUTION INTRAMUSCULAR; INTRAVENOUS ONCE
Status: COMPLETED | OUTPATIENT
Start: 2017-04-28 | End: 2017-04-28

## 2017-04-28 RX ORDER — GLIPIZIDE 5 MG/1
10 TABLET, FILM COATED, EXTENDED RELEASE ORAL
Status: DISCONTINUED | OUTPATIENT
Start: 2017-04-29 | End: 2017-04-30

## 2017-04-28 RX ORDER — PRAMIPEXOLE DIHYDROCHLORIDE 0.5 MG/1
1 TABLET ORAL AT BEDTIME
Status: DISCONTINUED | OUTPATIENT
Start: 2017-04-28 | End: 2017-05-02 | Stop reason: HOSPADM

## 2017-04-28 RX ORDER — CEFTRIAXONE 1 G/1
1 INJECTION, POWDER, FOR SOLUTION INTRAMUSCULAR; INTRAVENOUS EVERY 24 HOURS
Status: DISCONTINUED | OUTPATIENT
Start: 2017-04-29 | End: 2017-04-30

## 2017-04-28 RX ORDER — ONDANSETRON 2 MG/ML
4 INJECTION INTRAMUSCULAR; INTRAVENOUS EVERY 6 HOURS PRN
Status: DISCONTINUED | OUTPATIENT
Start: 2017-04-28 | End: 2017-05-02 | Stop reason: HOSPADM

## 2017-04-28 RX ORDER — DEXTROSE MONOHYDRATE 25 G/50ML
25-50 INJECTION, SOLUTION INTRAVENOUS
Status: DISCONTINUED | OUTPATIENT
Start: 2017-04-28 | End: 2017-05-02 | Stop reason: HOSPADM

## 2017-04-28 RX ADMIN — HYDROCODONE BITARTRATE AND ACETAMINOPHEN 1 TABLET: 5; 325 TABLET ORAL at 21:50

## 2017-04-28 RX ADMIN — POTASSIUM CHLORIDE, DEXTROSE MONOHYDRATE AND SODIUM CHLORIDE: 150; 5; 450 INJECTION, SOLUTION INTRAVENOUS at 17:41

## 2017-04-28 RX ADMIN — SODIUM CHLORIDE, PRESERVATIVE FREE 1000 ML: 5 INJECTION INTRAVENOUS at 15:00

## 2017-04-28 RX ADMIN — SODIUM CHLORIDE, PRESERVATIVE FREE 1000 ML: 5 INJECTION INTRAVENOUS at 21:07

## 2017-04-28 RX ADMIN — ACETAMINOPHEN 1000 MG: 500 TABLET ORAL at 16:48

## 2017-04-28 RX ADMIN — ACETAMINOPHEN 650 MG: 325 TABLET ORAL at 23:36

## 2017-04-28 RX ADMIN — HYDROMORPHONE HYDROCHLORIDE 0.5 MG: 1 INJECTION, SOLUTION INTRAMUSCULAR; INTRAVENOUS; SUBCUTANEOUS at 14:59

## 2017-04-28 RX ADMIN — VANCOMYCIN HYDROCHLORIDE 1250 MG: 1 INJECTION, POWDER, LYOPHILIZED, FOR SOLUTION INTRAVENOUS at 17:42

## 2017-04-28 RX ADMIN — GABAPENTIN 300 MG: 300 CAPSULE ORAL at 20:58

## 2017-04-28 RX ADMIN — SODIUM CHLORIDE, PRESERVATIVE FREE 1000 ML: 5 INJECTION INTRAVENOUS at 14:30

## 2017-04-28 RX ADMIN — METOPROLOL TARTRATE 5 MG: 5 INJECTION INTRAVENOUS at 14:27

## 2017-04-28 RX ADMIN — WARFARIN SODIUM 2.5 MG: 2.5 TABLET ORAL at 20:58

## 2017-04-28 RX ADMIN — PRAMIPEXOLE DIHYDROCHLORIDE 1 MG: 0.5 TABLET ORAL at 20:58

## 2017-04-28 RX ADMIN — METOPROLOL TARTRATE 5 MG: 5 INJECTION INTRAVENOUS at 14:12

## 2017-04-28 RX ADMIN — CEFTRIAXONE SODIUM 1 G: 1 INJECTION, POWDER, FOR SOLUTION INTRAMUSCULAR; INTRAVENOUS at 12:47

## 2017-04-28 RX ADMIN — SODIUM CHLORIDE 1000 ML: 9 INJECTION, SOLUTION INTRAVENOUS at 11:14

## 2017-04-28 RX ADMIN — DILTIAZEM HYDROCHLORIDE 5 MG/HR: 5 INJECTION INTRAVENOUS at 15:26

## 2017-04-28 RX ADMIN — ACETAMINOPHEN 650 MG: 325 TABLET ORAL at 11:19

## 2017-04-28 RX ADMIN — SODIUM CHLORIDE 500 ML: 9 INJECTION, SOLUTION INTRAVENOUS at 22:55

## 2017-04-28 RX ADMIN — METOPROLOL TARTRATE 5 MG: 5 INJECTION INTRAVENOUS at 14:07

## 2017-04-28 RX ADMIN — HYDROMORPHONE HYDROCHLORIDE 0.5 MG: 1 INJECTION, SOLUTION INTRAMUSCULAR; INTRAVENOUS; SUBCUTANEOUS at 15:30

## 2017-04-28 ASSESSMENT — ACTIVITIES OF DAILY LIVING (ADL)
TOILETING: 1-->ASSISTIVE EQUIPMENT
FALL_HISTORY_WITHIN_LAST_SIX_MONTHS: YES
AMBULATION: 1-->ASSISTIVE EQUIPMENT
NUMBER_OF_TIMES_PATIENT_HAS_FALLEN_WITHIN_LAST_SIX_MONTHS: 1
SWALLOWING: 0-->SWALLOWS FOODS/LIQUIDS WITHOUT DIFFICULTY
RETIRED_COMMUNICATION: 0-->UNDERSTANDS/COMMUNICATES WITHOUT DIFFICULTY
COGNITION: 0 - NO COGNITION ISSUES REPORTED
DRESS: 0-->INDEPENDENT
RETIRED_EATING: 0-->INDEPENDENT
TRANSFERRING: 1-->ASSISTIVE EQUIPMENT
BATHING: 0-->INDEPENDENT

## 2017-04-28 NOTE — ED NOTES
Was found by home Physical therapy on the toilet, noticed heart rate was in the 150's so called 911. Patient states has been on the toilet all night, and is dizzy when she gets up.

## 2017-04-28 NOTE — IP AVS SNAPSHOT
"` `     [address] MEDICAL SURGICAL: [phone]                                              INTERAGENCY TRANSFER FORM - NURSING   2017                    Hospital Admission Date: 2017  MARI HERNANDEZ   : 1940  Sex: Female        Attending Provider: Arash Silva MD     Allergies:  Ciprofloxacin, Oxycodone, Lisinopril, Penicillins, Sulfa Drugs    Infection:  None   Service:  INTERNAL MED    Ht:  1.651 m (5' 5\")   Wt:  85.9 kg (189 lb 6 oz)   Admission Wt:  81.2 kg (179 lb 0.2 oz)    BMI:  31.51 kg/m 2   BSA:  1.98 m 2            Patient PCP Information     Provider PCP Type    Dheeraj Jj MD General      Current Code Status     Date Active Code Status Order ID Comments User Context       Prior      Code Status History     Date Active Date Inactive Code Status Order ID Comments User Context    2017 10:00 AM  Full Code [id number]  Jian Hartley MD Outpatient    2017  7:16 PM 2017 10:00 AM Full Code [id number]  Jian Hartley MD Inpatient    2016  1:46 PM 2017  7:16 PM Full Code [id number]  Isadora Mosley PA-C Outpatient    2016 10:41 AM 2016  1:46 PM Full Code [id number]  Arash Silva MD Outpatient    7/3/2016 11:12 AM 2016 10:41 AM Full Code [id number]  Isadora Mosley PA-C Inpatient    2016  5:08 AM 7/3/2016 11:12 AM Full Code [id number]  Mike Wong MD Inpatient    3/23/2015  7:40 PM 3/25/2015  1:40 PM Full Code [id number]  Luisa Adan MD Inpatient    2014  9:42 AM 3/23/2015  7:40 PM Full Code [id number]  Jian Hartley MD Outpatient    2014  4:26 PM 2014  9:42 AM Full Code [id number]  Mick Dale MD ED    2014  2:02 PM 2014  4:26 PM Full Code [id number]  Jian Hartley MD Outpatient    2014 11:04 AM 2014  2:02 PM Full Code [id number]  Jian Hartley MD Outpatient    11/10/2014  6:44 PM 2014 11:04 AM Full Code [id number]  Judith, " Mike Garcia MD Inpatient    9/14/2014  1:12 PM 11/10/2014  6:44 PM Full Code [id number]  Jian Hartley MD Outpatient    9/12/2014  1:02 PM 9/14/2014  1:12 PM Full Code [id number]  Radha Field PA-C Inpatient    7/20/2014  9:02 AM 9/12/2014  1:02 PM Full Code [id number]  Cesar Lafleur MD Outpatient    7/15/2014  5:49 PM 7/20/2014  9:02 AM Full Code [id number]  Radha Field PA-C Inpatient    2/22/2014  4:21 AM 2/22/2014  2:33 PM Full Code [id number]  Mike Ogden MD ED    1/17/2014  8:18 PM 1/18/2014 12:34 PM Full Code [id number]  Judson Gan RN Inpatient    2/1/2013  2:01 PM 1/17/2014  8:18 PM Full Code [id number]  Nadira Mccann MD Outpatient    2/1/2013 12:18 AM 2/1/2013  2:01 PM Full Code [id number]  Rajidner Dwyer MD Inpatient    1/31/2013  9:39 PM 2/1/2013 12:18 AM Full Code [id number]  Arash Silva MD Outpatient    1/31/2013  7:09 PM 1/31/2013  9:39 PM Full Code [id number]  Radha Field PA-C Inpatient    7/10/2008  2:44 PM 1/31/2013  7:09 PM None None HEALTH CARE DIRECTIVE ON FILE 7-3-08 Melina [employer] Demographics      Advance Directives        Does patient have a scanned Advance Directive/ACP document in EPIC?           Yes        Hospital Problems as of 5/2/2017              Priority Class Noted POA    Sleep apnea   Unknown Yes    Restless leg syndrome Low  Unknown Yes    Chronic kidney disease, stage III (moderate) Medium  10/17/2007 Yes    CAD - NSTEMI, CABG x 5 2007, angio in 2013 with patent grafts other than occluded graft to PL   2/4/2013 Yes    Lymphedema Medium  11/10/2014 Yes    Anemia Medium  11/10/2014 Yes    Type 2 diabetes mellitus with other circulatory complications (H) Medium  1/19/2016 Yes    Acute kidney injury (H) Medium  7/2/2016 Yes    Essential hypertension with goal blood pressure less than 140/90 Medium  9/13/2016 Yes    Atrial fibrillation with rapid ventricular response (H) Medium  4/28/2017 Yes    Severe sepsis with acute organ dysfunction due to Gram  negative bacteria (H) Medium  4/28/2017 Yes    * (Principal)UTI due to Klebsiella species Medium  4/28/2017 Yes    Rash - redness medial thighs Medium  4/29/2017 Yes    Supratherapeutic INR Medium  4/30/2017 No    Metabolic acidosis, normal anion gap (NAG) Medium  4/30/2017 Yes    Acute encephalopathy Medium  5/1/2017 Yes      Non-Hospital Problems as of 5/2/2017              Priority Class Noted    Lipoma of other skin and subcutaneous tissue   9/7/2004    ESOPHAGEAL REFLUX   3/28/2006    Postsurgical aortocoronary bypass status Low  1/27/2007    History of peptic ulcer disease Low  10/17/2007    Edema Medium  1/31/2008    Obesity   1/31/2008    Kidney stone   12/29/2009    Hyperlipidemia LDL goal <100 Medium  10/31/2010    Advanced directives, counseling/discussion Low  11/12/2012    ACS (acute coronary syndrome) (H)   2/1/2013    Hx of non-ST elevation myocardial infarction (NSTEMI)   2/4/2013    Paroxysmal atrial fibrillation (H) Medium  1/17/2014    Health Care Home   1/20/2014    Jaw pain   2/22/2014    Hypokalemia Medium  7/15/2014    Long term current use of anticoagulant therapy   9/16/2014    Cellulitis and abscess of leg Medium  11/10/2014    Hyperkalemia Medium  11/10/2014    Renal failure   3/23/2015    Osteoarthritis of hip Medium  4/29/2015    Non morbid obesity, unspecified obesity type Medium  11/1/2015    Long-term (current) use of anticoagulants [Z79.01] Medium  3/4/2016    Pulmonary infiltrate Medium  7/2/2016    Fracture of hip, left, closed, initial encounter (H) Medium  7/2/2016    SIRS (systemic inflammatory response syndrome) (H) Medium  7/2/2016    Acute respiratory failure with hypoxia (H) Medium  7/5/2016    Trochanteric bursitis of left hip Medium  10/11/2016      Immunizations     Name Date      Hepatitis A Vac Ped/Adol-2 Dose 10/13/98     Hepatitis A Vac Ped/Adol-2 Dose 03/11/98     Hepatitis B 09/25/00     Hepatitis B 10/19/99     Hepatitis B 08/10/99     Influenza (High Dose) 3  valent vaccine 09/13/16     Influenza (High Dose) 3 valent vaccine 09/23/15     Influenza (High Dose) 3 valent vaccine 09/24/14     Influenza (High Dose) 3 valent vaccine 12/06/13     Influenza (High Dose) 3 valent vaccine 09/25/12     Influenza (IIV3) 10/01/10     Influenza (IIV3) 10/21/09     Influenza (IIV3) 12/03/08     Influenza (IIV3) 12/06/07     Influenza (IIV3) 04/02/07     Influenza (IIV3) 11/29/06     Influenza (IIV3) 11/03/05     Influenza (IIV3) 10/29/04     Influenza (IIV3) 11/10/03     Influenza (IIV3) 10/16/02     Influenza (IIV3) 11/16/01     Meningococcal (Menomune ) 04/02/07     Pneumococcal (PCV 13) 09/13/16     Pneumococcal 23 valent 11/17/14     Pneumococcal 23 valent 12/03/08     Poliovirus, inactivated (IPV) 04/02/07     TD (ADULT, 7+) 10/31/10     TD (ADULT, 7+) 08/07/00     TD (ADULT, 7+) 06/20/96     Typhoid IM 04/02/07     Yellow Fever 04/02/07     Zoster vaccine, live 11/01/11          END      ASSESSMENT     Discharge Profile Flowsheet     EXPECTED DISCHARGE     Communication  0-->understands/communicates without difficulty 05/02/17 1010    Expected Discharge Date  05/02/17 04/30/17 1047   Swallowing  0-->swallows foods/liquids without difficulty 05/02/17 1010    DISCHARGE NEEDS ASSESSMENT     GASTROINTESTINAL (ADULT,PEDIATRIC,OB)      Concerns To Be Addressed  no discharge needs identified;denies needs/concerns at this time 10/06/16 1312   GI WDL  WDL 05/02/17 0740    Patient/family verbalizes understanding of discharge plan recommendations?  Yes 04/30/17 1047   Last Bowel Movement  04/29/17 04/30/17 0028    Medical Team notified of plan?  yes 04/30/17 1047   COMMUNICATION ASSESSMENT      Readmission Within The Last 30 Days  no previous admission in last 30 days 04/28/17 2018   Patient's communication style  spoken language (English or Bilingual) 04/28/17 1024    Anticipated Changes Related to Illness  inability to care for self 04/28/17 2018   FINAL RESOURCES      Equipment Currently  "Used at Home  raised toilet;walker, rolling;wheelchair;shower chair 05/01/17 1102   PAS Number  [id number] 10/06/16 1312    Transportation Available  family or friend will provide 05/01/17 1102   Senior Linkage Line Referral Placed  07/02/16 10/06/16 1312    # of Referrals Placed by CTS  Post Acute Facilities 04/30/17 1047   F/U Appointment Brochure Provided  07/02/16 10/06/16 1312    Key Recommendations  TBD after surgery 10/06/16 1312   Existing Resources/Services  Home Care 11/18/14 1451    Does Patient Need a Referral for Clinic CC  No 03/24/15 1445   SKIN      Equipment Used at Home  walker, rolling 07/15/14 1946   Inspection  Full 05/02/17 0740    FUNCTIONAL LEVEL CURRENT     Skin WDL  ex 05/02/17 0740    Ambulation  3-->assistive equipment and person 05/02/17 1010   Skin Color/Characteristics  bruised (ecchymotic) (scattered) 05/02/17 0740    Transferring  3-->assistive equipment and person 05/02/17 1010   Skin Temperature  warm 05/02/17 0207    Toileting  3-->assistive equipment and person 05/02/17 1010   Skin Integrity  scar(s) 05/02/17 0207    Bathing  2-->assistive person 05/02/17 1010   SAFETY      Dressing  0-->independent 05/02/17 1010   Safety WDL  WDL 05/02/17 0740    Eating  0-->independent 05/02/17 1010   Safety Factors  call light in reach;bed in low position 05/02/17 0223                 Assessment WDL (Within Defined Limits) Definitions           Safety WDL     Effective: 09/28/15    Row Information: <b>WDL Definition:</b> Bed in low position, wheels locked; call light in reach; upper side rails up x 2; ID band on<br> <font color=\"gray\"><i>Item=AS safety wdl>>List=AS safety wdl>>Version=F14</i></font>      Skin WDL     Effective: 09/28/15    Row Information: <b>WDL Definition:</b> Warm; dry; intact; elastic; without discoloration; pressure points without redness<br> <font color=\"gray\"><i>Item=AS skin wdl>>List=AS skin wdl>>Version=F14</i></font>      Vitals     Vital Signs Flowsheet     VITAL " "SIGNS     ANALGESIA SIDE EFFECTS MONITORING      Temp  98.9  F (37.2  C) 05/02/17 0610   Side Effects Monitoring: Respiratory Quality  R 04/30/17 1408    Temp src  Oral 05/02/17 0610   Side Effects Monitoring: Respiratory Depth  N 04/30/17 1408    Resp  16 05/02/17 0610   Side Effects Monitoring: Sedation Level  S 04/30/17 1408    Pulse  136 05/02/17 0610   HEIGHT AND WEIGHT      Heart Rate  136 05/02/17 0927   Height  1.651 m (5' 5\") 04/28/17 1723    Pulse/Heart Rate Source  Monitor 05/02/17 0610   Weight  85.9 kg (189 lb 6 oz) 05/02/17 0519    BP  133/88 05/02/17 0610   BSA (Calculated - sq m)  1.93 04/28/17 1723    BP Location  Left arm 05/02/17 0031   BMI (Calculated)  29.96 04/28/17 1723    OXYGEN THERAPY     EKG MONITORING      SpO2  96 % 05/02/17 0610   Cardiac Regularity  Irregular 05/02/17 0927    O2 Device  None (Room air) 05/02/17 0610   Cardiac Rhythm  Atrial fibrillation 05/02/17 0927    Oxygen Delivery  1 LPM 04/29/17 1036   BRIANNE COMA SCALE      PAIN/COMFORT     Best Eye Response  4-->(E4) spontaneous 04/30/17 1453    Patient Currently in Pain  denies 05/02/17 0927   Best Motor Response  6-->(M6) obeys commands 04/30/17 1453    Preferred Pain Scale  CAPA (Clinically Aligned Pain Assessment) ([facility], [facility] and [facility] Adults Only) 04/30/17 2003   Best Verbal Response  5-->(V5) oriented 04/30/17 1453    0-10 Pain Scale  0 04/28/17 1651   Brianne Coma Scale Score  15 04/30/17 1453    Pain Location  Leg 04/30/17 0908   RESPIRATORY MONITORING      Pain Orientation  Left 04/30/17 0908   Respiratory Monitoring (EtCO2)  0 mmHg 04/28/17 1635    Pain Descriptors  Aching 04/30/17 0908   POSITIONING      Pain Management Interventions  analgesia administered;massage provided 04/30/17 0121   Body Position  independently positioning 05/02/17 0207    Pain Intervention(s)  Medication (See eMAR) 04/30/17 0014   Head of Bed (HOB)  HOB at 30-45 degrees 05/01/17 0942    CLINICALLY ALIGNED PAIN ASSESSMENT (CAPA) ([facility], " [facility] AND [facility] ADULTS ONLY)     Chair  Upright in chair 04/30/17 2003    Comfort  negligible pain 04/30/17 2003   DAILY CARE      Change in Pain  about the same 04/30/17 0908   Activity Type  ambulated to bathroom 05/02/17 0740    Pain Control  partially effective 04/30/17 0908   Activity Level of Assistance  assistance, 1 person 05/02/17 0740    Functioning  can do most things, but pain gets in the way of some 04/30/17 0908   ECG      Sleep  awake with occasional pain 04/30/17 0908   ECG Rhythm  Atrial fibrillation;Atrial flutter 04/30/17 2003            Patient Lines/Drains/Airways Status    Active LINES/DRAINS/AIRWAYS     Name: Placement date: Placement time: Site: Days: Last dressing change:    Wound 04/28/17 Lateral;Left Hip Surgical from previous surgery 04/28/17   1542   Hip   3     Wound 04/28/17 Lower;Left Leg Other (comment) scab dime size LLE 04/28/17   1630   Leg   3             Patient Lines/Drains/Airways Status    Active PICC/CVC     None            Intake/Output Detail Report     Date Intake     Output Net    Shift P.O. I.V. IV Piggyback Total Urine Total       Noc 04/30/17 2300 - 05/01/17 0659 200 861 -- 1061 500 500 561    Day 05/01/17 0700 - 05/01/17 1459 -- -- -- -- -- -- 0    Janki 05/01/17 1500 - 05/01/17 2259 960 -- -- 960 1880 1880 -920    Noc 05/01/17 2300 - 05/02/17 0659 360 -- -- 360 1580 1580 -1220    Day 05/02/17 0700 - 05/02/17 1459 -- -- -- -- 800 800 -800      Last Void/BM       Most Recent Value    Urine Occurrence 1 at 05/01/2017 0900    Stool Occurrence 1 at 05/01/2017 0900      Case Management/Discharge Planning     Case Management/Discharge Planning Flowsheet     REFERRAL INFORMATION     Concerns To Be Addressed  no discharge needs identified;denies needs/concerns at this time 10/06/16 1312    Admission Type  inpatient 04/30/17 1047   DISCHARGE PLANNING      Arrived From  home or self-care 04/30/17 1047   Patient/family verbalizes understanding of discharge plan  recommendations?  Yes 04/30/17 1047    Referral Source  interdisciplinary rounds 04/30/17 1047   Medical Team notified of plan?  yes 04/30/17 1047    # of Referrals Placed by CTS  Post Acute Facilities 04/30/17 1047   Readmission Within The Last 30 Days  no previous admission in last 30 days 04/28/17 2018    Reason For Consult  discharge planning 04/30/17 1047   Anticipated Changes Related to Illness  inability to care for self 04/28/17 2018    Primary Care Clinic Name  [facility] 04/30/17 1047   Transportation Available  family or friend will provide 05/01/17 1102    Primary Care MD Name  Parviz 04/30/17 1047   Key Recommendations  TBD after surgery 10/06/16 1312    LIVING ENVIRONMENT     Does Patient Need a Referral for Clinic CC  No 03/24/15 1445    Lives With  alone 05/01/17 1030   Outpatient/Agency/Support Group Needs  none 07/15/14 1946    Living Arrangements  house 05/01/17 1030   Equipment Used at Home  walker, rolling 07/15/14 1946    Provides Primary Care For  no one 04/30/17 1047   Discharge Plan Concerns  concerns to be addressed 04/28/17 2018    ASSESSMENT OF FAMILY/SOCIAL SUPPORT     FINAL RESOURCES      Marital Status   04/30/17 1047   Equipment Currently Used at Home  raised toilet;walker, rolling;wheelchair;shower chair 05/01/17 1102    Who is your support system?  Children 04/30/17 1047   PAS Number  [id number] 10/06/16 1312    Description of Support System  Involved;Supportive 04/30/17 1047   Senior Linkage Line Referral Placed  07/02/16 10/06/16 1312    Support Assessment  Adequate family and caregiver support 04/30/17 1047   F/U Appointment Brochure Provided  07/02/16 10/06/16 1312    COPING/STRESS     Existing Resources/Services  Home Care 11/18/14 1451    Major Change/Loss/Stressor  medical condition/diagnosis 04/30/17 1047   ABUSE RISK SCREEN      Patient Personal Strengths  expressive of needs;expressive of emotions;resilient 04/30/17 1047   QUESTION TO PATIENT:  Has a member of your  family or a partner(now or in the past) intimidated, hurt, manipulated, or controlled you in any way?  no 04/28/17 2016    Sources Of Support  adult child(zoe) 04/30/17 1047   QUESTION TO PATIENT: Do you feel safe going back to the place where you are living?  yes 04/28/17 2016    Reaction To Health Status  accepting 04/30/17 1047   OBSERVATION: Is there reason to believe there has been maltreatment of a vulnerable adult (ie. Physical/Sexual/Emotional abuse, self neglect, lack of adequate food, shelter, medical care, or financial exploitation)?  no 04/28/17 1027    Understanding Of Condition And Treatment  adequate understanding of treatment;adequate understanding of medical condition 04/30/17 1047   (R) MENTAL HEALTH SUICIDE RISK      EXPECTED DISCHARGE     Are you depressed or being treated for depression?  Yes 04/28/17 2017    Expected Discharge Date  05/02/17 04/30/17 1047   HOMICIDE RISK      ASSESSMENT/CONCERNS TO BE ADDRESSED     Homicidal Ideation  no 04/28/17 1027

## 2017-04-28 NOTE — ED NOTES
Patient complaining of neck/jaw pain, states this is the same type of pain that she gets when there is a problem with her heart. Dr. Hoff informed. Obtained 12 lead EKG. Sapphire PONCE informed

## 2017-04-28 NOTE — ED NOTES
This RN spoke with patients daughter, Jennifer. She will attempt to come to ICU this evening. Patient is awake and confused at this time. Her daughter reports that is normal for patient.

## 2017-04-28 NOTE — IP AVS SNAPSHOT
MRN:[id number]                      After Visit Summary   4/28/2017    Kathryn Banks    MRN: [id number]           Thank you!     Thank you for choosing [facility] for your care. Our goal is always to provide you with excellent care. Hearing back from our patients is one way we can continue to improve our services. Please take a few minutes to complete the written survey that you may receive in the mail after you visit with us. Thank you!        Patient Information     Date Of Birth          1940        Designated Caregiver       Most Recent Value    Caregiver    Will someone help with your care after discharge? yes    Name of designated caregiver [facility]    Phone number of caregiver Unknown    Caregiver address Unknown      About your hospital stay     You were admitted on:  April 28, 2017 You last received care in the:  [address] [facility]    You were discharged on:  May 2, 2017       Who to Call     For medical emergencies, please call 911.  For non-urgent questions about your medical care, please call your primary care provider or clinic, [phone]          Attending Provider     Provider Specialty    Jose Bee MD Emergency Medicine    Briana Ball MD Family Practice    Arash Silva MD Internal Medicine       Primary Care Provider Office Phone # Fax #    Dheeraj Jj [phone]       [facility] [address] [place] [address]        After Care Instructions     Activity - Up with nursing assistance           Advance Diet as Tolerated       Follow this diet upon discharge: Orders Placed This Encounter      Room Service      Consistent Carbohydrate Diet 7723-0141 Calories: Moderate Consistent CHO (4-6 CHO units/meal)            General info for SNF       Length of Stay Estimate: Short Term Care: Estimated # of Days <30  Condition at Discharge: Improving  Level of care:skilled   Rehabilitation  Potential: Good  Admission H&P remains valid and up-to-date: Yes  Recent Chemotherapy: N/A  Use Nursing Home Standing Orders: Yes            Glucose monitor nursing POCT       Before meals and at bedtime            Mantoux instructions       Give two-step Mantoux (PPD) Per Facility Policy Yes                  Follow-up Appointments     Follow Up and recommended labs and tests       Follow up with senior living physician.  The following labs/tests are recommended: follow-up on  Renal function, potentially restart glipizide when resolved.                  Additional Services     Occupational Therapy Adult Consult       Evaluate and treat as clinically indicated.    Reason:  Weakness at home            Physical Therapy Adult Consult       Evaluate and treat as clinically indicated.    Reason:  Weakness at home                  Warfarin Instruction     You have started taking a medicine called warfarin. This is a blood-thinning medicine (anticoagulant). It helps prevent and treat blood clots.      Before leaving the hospital, make sure you know how much to take and how long to take it.      You will need regular blood tests to make sure your blood is clotting safely. It is very important to see your doctor for regular blood tests.    Talk to your doctor before taking any new medicine (this includes over-the-counter drugs and herbal products). Many medicines can interact with warfarin. This may cause more bleeding or too much clotting.     Eating a lot of vitamin K--found in green, leafy vegetables--can change the way warfarin works in your body. Do NOT avoid these foods. Instead, try to eat the same amount each day.     Bleeding is the most common side-effect of warfarin. You may notice bleeding gums, a bloody nose, bruises and bleeding longer when you cut yourself. See a doctor at once if:   o You cough up blood  o You find blood in your stool (poop)  o You have a deep cut, or a cut that bleeds longer than 10 minutes  "  o You have a bad cut, hard fall, accident or hit your head (go to urgent care or the emergency room).    For women who can get pregnant: This medicine can harm an unborn baby. Be very careful not to get pregnant while taking this medicine. If you think you might be pregnant, call your doctor right away.    For more information, read \"Guide to Warfarin Therapy,  the booklet you received in the hospital.        Pending Results     Date and Time Order Name Status Description    4/28/2017 1616 Blood culture Preliminary     4/28/2017 1616 Blood culture Preliminary             Statement of Approval     Ordered          05/02/17 1002  I have reviewed and agree with all the recommendations and orders detailed in this document.  EFFECTIVE NOW     Approved and electronically signed by:  Jian Hartley MD             Admission Information     Date & Time Provider Department Dept. Phone    4/28/2017 Arash Silva MD [address] [facility] [phone]      Your Vitals Were     Blood Pressure Pulse Temperature Respirations Height Weight    133/88 136 98.9  F (37.2  C) (Oral) 16 1.651 m (5' 5\") 85.9 kg (189 lb 6 oz)    Pulse Oximetry BMI (Body Mass Index)                96% 31.51 kg/m2          MyChart Information     [employer] gives you secure access to your electronic health record. If you see a primary care provider, you can also send messages to your care team and make appointments. If you have questions, please call your primary care clinic.  If you do not have a primary care provider, please call [phone] and they will assist you.        Care EveryWhere ID     This is your Care EveryWhere ID. This could be used by other organizations to access your [facility] medical records  [id number]           Review of your medicines      START taking        Dose / Directions    cefdinir 300 MG capsule   Commonly known as:  OMNICEF   Indication:  Infection of Blood or Tissues affecting the Whole Body, " Urinary Tract Infection   Used for:  Urinary tract infection without hematuria, site unspecified        Dose:  300 mg   Take 1 capsule (300 mg) by mouth daily for 7 days   Refills:  0       * insulin aspart 100 UNIT/ML injection   Commonly known as:  NovoLOG PEN   Used for:  Type 2 diabetes mellitus with other circulatory complications (H)        Dose:  1-7 Units   Inject 1-7 Units Subcutaneous 3 times daily (with meals) Correction Scale - MEDIUM INSULIN RESISTANCE DOSING    Do Not give Correction Insulin if BG < 140. For  - 189 give 1 unit. For  - 239 give 2 units. For  - 289 give 3 units. For  - 339 give 4 units. For BG = or > 340 give 5 units. Check blood glucose before meals/bolus feedings and administer based on blood glucose. Notify MD if glu > or = 350   Refills:  0       * insulin aspart 100 UNIT/ML injection   Commonly known as:  NovoLOG PEN   Used for:  Type 2 diabetes mellitus with other circulatory complications (H)        Dose:  1-5 Units   Inject 1-5 Units Subcutaneous At Bedtime Bedtime Blood Glucose (BG) For  - 249 give 1 units.  For  - 299 give 2 units.  For  - 349 give 3 units. For - 399 give 4 units.  For BG = or > 400 give 5 units.   Refills:  0       metoprolol 50 MG tablet   Commonly known as:  LOPRESSOR        Dose:  50 mg   Take 1 tablet (50 mg) by mouth 2 times daily   Quantity:  60 tablet   Refills:  0       * Notice:  This list has 2 medication(s) that are the same as other medications prescribed for you. Read the directions carefully, and ask your doctor or other care provider to review them with you.      CONTINUE these medicines which may have CHANGED, or have new prescriptions. If we are uncertain of the size of tablets/capsules you have at home, strength may be listed as something that might have changed.        Dose / Directions    pramipexole 1 MG tablet   Commonly known as:  MIRAPEX   This may have changed:  See the new instructions.    Used for:  Restless legs syndrome (RLS), Hyperlipidemia LDL goal <100, Hypertension goal BP (blood pressure) < 140/90        TAKE 3 TABLETS BY MOUTH EVERY EVENING   Quantity:  270 tablet   Refills:  2       simvastatin 40 MG tablet   Commonly known as:  ZOCOR   This may have changed:  See the new instructions.   Used for:  Hypertension goal BP (blood pressure) < 140/90, Hyperlipidemia LDL goal <100        TAKE ONE TABLET BY MOUTH EVERY NIGHT AT BEDTIME   Quantity:  90 tablet   Refills:  3       warfarin 2.5 MG tablet   Commonly known as:  COUMADIN   This may have changed:  additional instructions   Used for:  Long-term (current) use of anticoagulants, Paroxysmal atrial fibrillation (H)        as directed by the coumadin clinic   Quantity:  180 tablet   Refills:  0         CONTINUE these medicines which have NOT CHANGED        Dose / Directions    ASPIRIN NOT PRESCRIBED   Commonly known as:  INTENTIONAL   Used for:  Type 2 diabetes mellitus with other circulatory complications (H)        Antiplatelet medication not prescribed intentionally due to Current anticoagulant therapy (warfarin/enoxaparin)   Quantity:  0 each   Refills:  0       calcium carbonate-vitamin D 500-400 MG-UNIT Tabs per tablet   Used for:  Closed intertrochanteric fracture of left hip, initial encounter (H)        Dose:  1 tablet   Take 1 tablet by mouth 3 times daily   Quantity:  180 tablet   Refills:  3       cyanocobalamin 1000 MCG/ML injection   Commonly known as:  VITAMIN B12   Used for:  Vitamin B12 deficiency (non anemic)        Dose:  1 mL   Inject 1 mL (1,000 mcg) into the muscle every 30 days   Quantity:  1 mL   Refills:  11       ferrous sulfate 325 (65 FE) MG tablet   Commonly known as:  IRON   Used for:  Anemia, unspecified type        Dose:  325 mg   Take 1 tablet (325 mg) by mouth daily (with breakfast)   Quantity:  100 tablet   Refills:  0       furosemide 40 MG tablet   Commonly known as:  LASIX   Used for:  Lymphedema        40  mg a day   Quantity:  90 tablet   Refills:  3       gabapentin 300 MG capsule   Commonly known as:  NEURONTIN   Used for:  Restless leg syndrome        Dose:  300 mg   Take 1 capsule (300 mg) by mouth 2 times daily   Quantity:  180 capsule   Refills:  1       isosorbide mononitrate 30 MG 24 hr tablet   Commonly known as:  IMDUR   Used for:  Hx of non-ST elevation myocardial infarction (NSTEMI)        Dose:  90 mg   Take 3 tablets (90 mg) by mouth daily   Quantity:  30 tablet   Refills:  2       nitroglycerin 0.4 MG sublingual tablet   Commonly known as:  NITROSTAT   Used for:  Postsurgical aortocoronary bypass status        Dose:  0.4 mg   Place 1 tablet under the tongue See Admin Instructions. for chest pain   Quantity:  25 tablet   Refills:  0       order for DME   Used for:  ANABEL (obstructive sleep apnea)        Equipment being ordered: cpap machine, mask, humidifier, tubing and filters   Quantity:  1 Device   Refills:  0         STOP taking     glipiZIDE 10 MG 24 hr tablet   Commonly known as:  glipiZIDE XL           metoprolol 100 MG 24 hr tablet   Commonly known as:  TOPROL-XL                Where to get your medicines      Some of these will need a paper prescription and others can be bought over the counter. Ask your nurse if you have questions.     You don't need a prescription for these medications     cefdinir 300 MG capsule    furosemide 40 MG tablet    insulin aspart 100 UNIT/ML injection    insulin aspart 100 UNIT/ML injection    metoprolol 50 MG tablet    pramipexole 1 MG tablet    simvastatin 40 MG tablet    warfarin 2.5 MG tablet                Protect others around you: Learn how to safely use, store and throw away your medicines at www.disposemymeds.org.             Medication List: This is a list of all your medications and when to take them. Check marks below indicate your daily home schedule. Keep this list as a reference.      Medications           Morning Afternoon Evening Bedtime As Needed     ASPIRIN NOT PRESCRIBED   Commonly known as:  INTENTIONAL   Antiplatelet medication not prescribed intentionally due to Current anticoagulant therapy (warfarin/enoxaparin)                                calcium carbonate-vitamin D 500-400 MG-UNIT Tabs per tablet   Take 1 tablet by mouth 3 times daily                                cefdinir 300 MG capsule   Commonly known as:  OMNICEF   Take 1 capsule (300 mg) by mouth daily for 7 days   Last time this was given:  300 mg on 5/2/2017  8:26 AM                                cyanocobalamin 1000 MCG/ML injection   Commonly known as:  VITAMIN B12   Inject 1 mL (1,000 mcg) into the muscle every 30 days                                ferrous sulfate 325 (65 FE) MG tablet   Commonly known as:  IRON   Take 1 tablet (325 mg) by mouth daily (with breakfast)   Last time this was given:  325 mg on 5/2/2017  8:25 AM                                furosemide 40 MG tablet   Commonly known as:  LASIX   40 mg a day   Last time this was given:  40 mg on 5/2/2017  8:26 AM                                gabapentin 300 MG capsule   Commonly known as:  NEURONTIN   Take 1 capsule (300 mg) by mouth 2 times daily   Last time this was given:  300 mg on 5/1/2017 10:16 PM                                * insulin aspart 100 UNIT/ML injection   Commonly known as:  NovoLOG PEN   Inject 1-7 Units Subcutaneous 3 times daily (with meals) Correction Scale - MEDIUM INSULIN RESISTANCE DOSING    Do Not give Correction Insulin if BG < 140. For  - 189 give 1 unit. For  - 239 give 2 units. For  - 289 give 3 units. For  - 339 give 4 units. For BG = or > 340 give 5 units. Check blood glucose before meals/bolus feedings and administer based on blood glucose. Notify MD if glu > or = 350   Last time this was given:  1 Units on 5/2/2017  8:27 AM                                * insulin aspart 100 UNIT/ML injection   Commonly known as:  NovoLOG PEN   Inject 1-5 Units Subcutaneous At Bedtime  Bedtime Blood Glucose (BG) For  - 249 give 1 units.  For  - 299 give 2 units.  For  - 349 give 3 units. For - 399 give 4 units.  For BG = or > 400 give 5 units.   Last time this was given:  1 Units on 5/2/2017  8:27 AM                                isosorbide mononitrate 30 MG 24 hr tablet   Commonly known as:  IMDUR   Take 3 tablets (90 mg) by mouth daily   Last time this was given:  30 mg on 5/2/2017  8:26 AM                                metoprolol 50 MG tablet   Commonly known as:  LOPRESSOR   Take 1 tablet (50 mg) by mouth 2 times daily   Last time this was given:  50 mg on 5/2/2017  8:26 AM                                nitroglycerin 0.4 MG sublingual tablet   Commonly known as:  NITROSTAT   Place 1 tablet under the tongue See Admin Instructions. for chest pain                                order for DME   Equipment being ordered: cpap machine, mask, humidifier, tubing and filters                                pramipexole 1 MG tablet   Commonly known as:  MIRAPEX   TAKE 3 TABLETS BY MOUTH EVERY EVENING   Last time this was given:  1 mg on 5/1/2017 10:16 PM                                simvastatin 40 MG tablet   Commonly known as:  ZOCOR   TAKE ONE TABLET BY MOUTH EVERY NIGHT AT BEDTIME   Last time this was given:  20 mg on 5/1/2017 10:16 PM                                warfarin 2.5 MG tablet   Commonly known as:  COUMADIN   as directed by the coumadin clinic   Last time this was given:  2.5 mg on 4/28/2017  8:58 PM                                * Notice:  This list has 2 medication(s) that are the same as other medications prescribed for you. Read the directions carefully, and ask your doctor or other care provider to review them with you.

## 2017-04-28 NOTE — LETTER
Transition Communication Hand-off for Care Transitions to Next Level of Care Provider    Name: Kathryn Banks  MRN #: [id number]  Primary Care Provider: Dheeraj Jj  Primary Care MD Name: Parviz  Primary Clinic: [facility] [address] [place] [address]  Primary Care Clinic Name: [facility]  Reason for Hospitalization:  Acute pyelonephritis [N10]  Atrial fibrillation with rapid ventricular response (H) [I48.91]  Urinary tract infection without hematuria, site unspecified [N39.0]  Atrial flutter, unspecified type (H) [I48.92]  Admit Date/Time: 4/28/2017 10:20 AM  Discharge Date: 5/2/17  Payor Source: Payor: BCBS / Plan: BCBS PLATINUM BLUE / Product Type: PPO /          Discharge Plan: To [facility] (Admissions: [phone] Main Phone: [phone] Fax: [phone]) TCU  Discharge Plan:       Most Recent Value    Disposition Comments  is at [facility]         Discharge Needs Assessment:  Needs       Most Recent Value    Anticipated Changes Related to Illness inability to care for self    Equipment Currently Used at Home raised toilet, walker, rolling, wheelchair, shower chair    Transportation Available family or friend will provide    # of Referrals Placed by [facility]    \A Chronology of [place] Hospitals\"" Number [id number]          Follow-up plan:  No future appointments.    Any outstanding tests or procedures:        Referrals     Future Labs/Procedures    Occupational Therapy Adult Consult     Comments:    Evaluate and treat as clinically indicated.    Reason:  Weakness at home    Physical Therapy Adult Consult     Comments:    Evaluate and treat as clinically indicated.    Reason:  Weakness at home             BON Simms  [facility] [phone]/ [place] [phone]      AVS/Discharge Summary is the source of truth; this is a helpful guide for improved communication of patient story

## 2017-04-28 NOTE — CONSULTS
Pharmacy Vancomycin Initial Note  Date of Service 2017  Patient's  1940  77 year old, female    Indication: Skin and Soft Tissue Infection    Current estimated CrCl = Estimated Creatinine Clearance: 35.6 mL/min (based on Cr of 1.44).    Creatinine for last 3 days  2017: 10:50 AM Creatinine 1.44 mg/dL    Recent Vancomycin Level(s) for last 3 days  No results found for requested labs within last 72 hours.      Vancomycin IV Administrations (past 72 hours)                   vancomycin 1250 mg in 0.9% NaCl 250 mL PREMIX (mg) 1,250 mg New Bag 17 1742                Nephrotoxins and other renal medications (Future)    Start     Dose/Rate Route Frequency Ordered Stop    17 1655  vancomycin 1250 mg in 0.9% NaCl 250 mL PREMIX      1,250 mg Intravenous EVERY 24 HOURS 17 1654            Contrast Orders - past 72 hours     None                Plan:  1.  Start vancomycin  1250 mg IV q24h.   2.  Goal Trough Level: 10-15 mg/L   3.  Pharmacy will check trough levels as appropriate in 1-3 Days.    4. Serum creatinine levels will be ordered daily for the first week of therapy and at least twice weekly for subsequent weeks.    5. [place] method utilized to dose vancomycin therapy: Method 2    Clay Carrillo, [facility]

## 2017-04-28 NOTE — IP AVS SNAPSHOT
"          [address] SURGICAL: [phone]                                              INTERAGENCY TRANSFER FORM - LAB / IMAGING / EKG / EMG RESULTS   2017                    Hospital Admission Date: 2017  MARI HERNANDEZ   : 1940  Sex: Female        Attending Provider: Arash Silva MD     Allergies:  Ciprofloxacin, Oxycodone, Lisinopril, Penicillins, Sulfa Drugs    Infection:  None   Service:  INTERNAL MED    Ht:  1.651 m (5' 5\")   Wt:  85.9 kg (189 lb 6 oz)   Admission Wt:  81.2 kg (179 lb 0.2 oz)    BMI:  31.51 kg/m 2   BSA:  1.98 m 2            Patient PCP Information     Provider PCP Type    Dheeraj Jj MD General         Lab Results - 3 Days      Glucose by meter [[id number]] (Abnormal)  Resulted: 17, Result status: Final result    Ordering provider: Briana Ball MD  17 07 Resulting lab: POINT OF CARE TEST, GLUCOSE    Specimen Information    Type Source Collected On     17 0736          Components       Value Reference Range Flag Lab   Glucose 171 70 - 99 mg/dL H 170            Blood culture [[id number]]  Resulted: 1740, Result status: Preliminary result    Ordering provider: Jose Bee MD  17 1616 Resulting lab: [facility]    Specimen Information    Type Source Collected On   Blood Hand, Right 17 1621          Components       Value Reference Range Flag Lab   Specimen Description Right Arm   [facility]   Special Requests 30   [facility]   Culture Micro No growth after 4 days   [facility]   Micro Report Status Pending   [facility]            Blood culture [[id number]]  Resulted: 17 0740, Result status: Preliminary result    Ordering provider: Jose Bee MD  17 1616 Resulting lab: [facility]    Specimen Information    Type Source Collected On   Blood Hand, Left 17 1624          Components       Value Reference Range Flag Lab   Specimen Description " Left Hand   [facility]   Special Requests 30   [facility]   Culture Micro No growth after 4 days   [facility]   Micro Report Status Pending   [facility]            Basic metabolic panel [[id number]] (Abnormal)  Resulted: 05/02/17 0605, Result status: Final result    Ordering provider: Arash Silva MD  05/02/17 0000 Resulting lab: [facility]    Specimen Information    Type Source Collected On   Blood  05/02/17 0530          Components       Value Reference Range Flag Lab   Sodium 147 133 - 144 mmol/L H [facility]   Potassium 4.3 3.4 - 5.3 mmol/L  [facility]   Chloride 121 94 - 109 mmol/L H [facility]   Carbon Dioxide 16 20 - 32 mmol/L L [facility]   Anion Gap 10 3 - 14 mmol/L  [facility]   Glucose 147 70 - 99 mg/dL H [facility]   Urea Nitrogen 37 7 - 30 mg/dL H [facility]   Creatinine 1.71 0.52 - 1.04 mg/dL H [facility]   GFR Estimate 29 >60 mL/min/1.7m2 L [facility]   Comment:  Non  GFR Calc   GFR Estimate If Black 35 >60 mL/min/1.7m2 L [facility]   Comment:  African American GFR Calc   Calcium 7.3 8.5 - 10.1 mg/dL L [facility]            INR [[id number]] (Abnormal)  Resulted: 05/02/17 0600, Result status: Final result    Ordering provider: Jian Hartley MD  05/02/17 0000 Resulting lab: [facility]    Specimen Information    Type Source Collected On   Blood  05/02/17 0530          Components       Value Reference Range Flag Lab   INR 2.44 0.86 - 1.14 H [facility]            Blood gas venous [[id number]] (Abnormal)  Resulted: 05/02/17 0547, Result status: Final result    Ordering provider: Arash Silva MD  05/02/17 0000 Resulting lab: [facility]    Specimen Information    Type Source Collected On   Blood  05/02/17 0530          Components       Value Reference Range Flag Lab   Ph Venous 7.30 7.32 - 7.43 pH L [facility]   PCO2 Venous 31 40 - 50 mm Hg L [facility]   PO2 Venous 54 25 - 47 mm Hg H [facility]   Bicarbonate Venous 15 21 - 28 mmol/L L [facility]   Base Deficit Venous  10.4 mmol/L  [facility]   Comment:  Abnormal Result, Ref range: -7.7 to 1.9   FIO2 21   [facility]            Glucose by meter [[id number]] (Abnormal)  Resulted: 05/01/17 2101, Result status: Final result    Ordering provider: Briana Ball MD  05/01/17 2045 Resulting lab: POINT OF CARE TEST, GLUCOSE    Specimen Information    Type Source Collected On     05/01/17 2045          Components       Value Reference Range Flag Lab   Glucose 172 70 - 99 mg/dL H 170            Glucose by meter [[id number]] (Abnormal)  Resulted: 05/01/17 1701, Result status: Final result    Ordering provider: Briana Ball MD  05/01/17 1650 Resulting lab: POINT OF CARE TEST, GLUCOSE    Specimen Information    Type Source Collected On     05/01/17 1650          Components       Value Reference Range Flag Lab   Glucose 195 70 - 99 mg/dL H 170            Glucose by meter [[id number]] (Abnormal)  Resulted: 05/01/17 1226, Result status: Final result    Ordering provider: Briana Ball MD  05/01/17 1222 Resulting lab: POINT OF CARE TEST, GLUCOSE    Specimen Information    Type Source Collected On     05/01/17 1222          Components       Value Reference Range Flag Lab   Glucose 139 70 - 99 mg/dL H 170            Glucose by meter [[id number]] (Abnormal)  Resulted: 05/01/17 0841, Result status: Final result    Ordering provider: Briana Ball MD  05/01/17 0835 Resulting lab: POINT OF CARE TEST, GLUCOSE    Specimen Information    Type Source Collected On     05/01/17 0835          Components       Value Reference Range Flag Lab   Glucose 116 70 - 99 mg/dL H 170            Hemoglobin [[id number]] (Abnormal)  Resulted: 05/01/17 0651, Result status: Final result    Ordering provider: Arash Silva MD  05/01/17 0000 Resulting lab: [facility]    Specimen Information    Type Source Collected On   Blood  05/01/17 0518          Components       Value Reference Range Flag Lab    Hemoglobin 8.8 11.7 - 15.7 g/dL L [facility]            Basic metabolic panel [[id number]] (Abnormal)  Resulted: 05/01/17 0557, Result status: Final result    Ordering provider: Arash Silva MD  05/01/17 0000 Resulting lab: [facility]    Specimen Information    Type Source Collected On   Blood  05/01/17 0518          Components       Value Reference Range Flag Lab   Sodium 146 133 - 144 mmol/L H [facility]   Potassium 4.7 3.4 - 5.3 mmol/L  [facility]   Chloride 122 94 - 109 mmol/L H [facility]   Carbon Dioxide 12 20 - 32 mmol/L L [facility]   Anion Gap 12 3 - 14 mmol/L  [facility]   Glucose 174 70 - 99 mg/dL H [facility]   Urea Nitrogen 48 7 - 30 mg/dL H [facility]   Creatinine 1.93 0.52 - 1.04 mg/dL H [facility]   GFR Estimate 25 >60 mL/min/1.7m2 L [facility]   Comment:  Non  GFR Calc   GFR Estimate If Black 31 >60 mL/min/1.7m2 L [facility]   Comment:  African American GFR Calc   Calcium 7.2 8.5 - 10.1 mg/dL L [facility]            Magnesium [[id number]]  Resulted: 05/01/17 0557, Result status: Final result    Ordering provider: Arash Silva MD  05/01/17 0000 Resulting lab: [facility]    Specimen Information    Type Source Collected On   Blood  05/01/17 0518          Components       Value Reference Range Flag Lab   Magnesium 2.2 1.6 - 2.3 mg/dL  [facility]            INR [[id number]] (Abnormal)  Resulted: 05/01/17 0553, Result status: Final result    Ordering provider: Jina Hartley MD  05/01/17 0000 Resulting lab: [facility]    Specimen Information    Type Source Collected On   Blood  05/01/17 0518          Components       Value Reference Range Flag Lab   INR 3.41 0.86 - 1.14 H [facility]            Blood gas venous [[id number]] (Abnormal)  Resulted: 05/01/17 0540, Result status: Final result    Ordering provider: Arash Silva MD  05/01/17 0000 Resulting lab: [facility]    Specimen Information    Type Source Collected On   Blood   05/01/17 0518          Components       Value Reference Range Flag Lab   Ph Venous 7.22 7.32 - 7.43 pH L [facility]   PCO2 Venous 29 40 - 50 mm Hg L [facility]   PO2 Venous 55 25 - 47 mm Hg H [facility]   Bicarbonate Venous 12 21 - 28 mmol/L L [facility]   Base Deficit Venous 14.5 mmol/L  [facility]   Comment:  Abnormal Result, Ref range: -7.7 to 1.9   FIO2 21   [facility]            Lactic acid level STAT [[id number]] (Abnormal)  Resulted: 05/01/17 0008, Result status: Final result    Ordering provider: Briana Ball MD  04/30/17 2347 Resulting lab: [facility]    Specimen Information    Type Source Collected On   Blood  05/01/17 0000          Components       Value Reference Range Flag Lab   Lactic Acid 0.6 0.7 - 2.1 mmol/L L [facility]            Glucose by meter [[id number]] (Abnormal)  Resulted: 04/30/17 2141, Result status: Final result    Ordering provider: Briana Ball MD  04/30/17 2128 Resulting lab: POINT OF CARE TEST, GLUCOSE    Specimen Information    Type Source Collected On     04/30/17 2128          Components       Value Reference Range Flag Lab   Glucose 212 70 - 99 mg/dL H 170            Glucose by meter [[id number]] (Abnormal)  Resulted: 04/30/17 1731, Result status: Final result    Ordering provider: Briana Ball MD  04/30/17 1704 Resulting lab: POINT OF CARE TEST, GLUCOSE    Specimen Information    Type Source Collected On     04/30/17 1704          Components       Value Reference Range Flag Lab   Glucose 120 70 - 99 mg/dL H 170            Glucose by meter [[id number]]  Resulted: 04/30/17 1206, Result status: Final result    Ordering provider: Briana Ball MD  04/30/17 1203 Resulting lab: POINT OF CARE TEST, GLUCOSE    Specimen Information    Type Source Collected On     04/30/17 1203          Components       Value Reference Range Flag Lab   Glucose 97 70 - 99 mg/dL  170            Glucose by meter [[id number]]  Resulted: 04/30/17 0841,  Result status: Final result    Ordering provider: Briana Ball MD  04/30/17 0801 Resulting lab: POINT OF CARE TEST, GLUCOSE    Specimen Information    Type Source Collected On     04/30/17 0801          Components       Value Reference Range Flag Lab   Glucose 95 70 - 99 mg/dL  170            Glucose by meter [[id number]] (Abnormal)  Resulted: 04/30/17 0745, Result status: Final result    Ordering provider: Briana Ball MD  04/30/17 0739 Resulting lab: POINT OF CARE TEST, GLUCOSE    Specimen Information    Type Source Collected On     04/30/17 0739          Components       Value Reference Range Flag Lab   Glucose 61 70 - 99 mg/dL L 170            Basic metabolic panel [[id number]] (Abnormal)  Resulted: 04/30/17 0608, Result status: Final result    Ordering provider: Briana Ball MD  04/30/17 0000 Resulting lab: [facility]    Specimen Information    Type Source Collected On   Blood  04/30/17 0536          Components       Value Reference Range Flag Lab   Sodium 144 133 - 144 mmol/L  [facility]   Potassium 4.6 3.4 - 5.3 mmol/L  [facility]   Chloride 121 94 - 109 mmol/L H [facility]   Carbon Dioxide 13 20 - 32 mmol/L L [facility]   Anion Gap 10 3 - 14 mmol/L  [facility]   Glucose 74 70 - 99 mg/dL  [facility]   Urea Nitrogen 45 7 - 30 mg/dL H [facility]   Creatinine 1.82 0.52 - 1.04 mg/dL H [facility]   GFR Estimate 27 >60 mL/min/1.7m2 L [facility]   Comment:  Non  GFR Calc   GFR Estimate If Black 33 >60 mL/min/1.7m2 L [facility]   Comment:  African American GFR Calc   Calcium 7.2 8.5 - 10.1 mg/dL L [facility]            INR [[id number]] (Abnormal)  Resulted: 04/30/17 0606, Result status: Final result    Ordering provider: Jian Hartley MD  04/30/17 0000 Resulting lab: [facility]    Specimen Information    Type Source Collected On   Blood  04/30/17 0536          Components       Value Reference Range Flag Lab   INR 4.64 0.86 - 1.14 H [facility]             CBC with platelets [[id number]] (Abnormal)  Resulted: 04/30/17 0549, Result status: Final result    Ordering provider: Briana Ball MD  04/30/17 0000 Resulting lab: [facility]    Specimen Information    Type Source Collected On   Blood  04/30/17 0536          Components       Value Reference Range Flag Lab   WBC 8.3 4.0 - 11.0 10e9/L  [facility]   RBC Count 3.66 3.8 - 5.2 10e12/L L [facility]   Hemoglobin 8.7 11.7 - 15.7 g/dL L [facility]   Hematocrit 30.1 35.0 - 47.0 % L [facility]   MCV 82 78 - 100 fl  [facility]   MCH 23.8 26.5 - 33.0 pg L [facility]   MCHC 28.9 31.5 - 36.5 g/dL L [facility]   RDW 19.2 10.0 - 15.0 % H [facility]   Platelet Count 246 150 - 450 10e9/L  [facility]            Glucose by meter [[id number]] (Abnormal)  Resulted: 04/29/17 2221, Result status: Final result    Ordering provider: Briana Ball MD  04/29/17 2217 Resulting lab: POINT OF CARE TEST, GLUCOSE    Specimen Information    Type Source Collected On     04/29/17 2217          Components       Value Reference Range Flag Lab   Glucose 123 70 - 99 mg/dL H 170            Methicillin resistant staph aureus cult [[id number]]  Resulted: 04/29/17 2218, Result status: Final result    Ordering provider: Briana Ball MD  04/28/17 1747 Resulting lab: [facility]    Specimen Information    Type Source Collected On   Nose  04/28/17 1747          Components       Value Reference Range Flag Lab   Specimen Description Nasopharyngeal   [facility]   Culture Micro No MRSA isolated   [facility]   Micro Report Status FINAL 04/29/2017   [facility]            Urine Culture [[id number]] (Abnormal)  Resulted: 04/29/17 1847, Result status: Final result    Ordering provider: Jose Bee MD  04/28/17 1203 Resulting lab: [facility]    Specimen Information    Type Source Collected On   Urine  04/28/17 1130          Components       Value Reference Range Flag Lab   Specimen  Description Unspecified Urine   [facility]   Culture Micro >100,000 colonies/mL Klebsiella pneumoniae  [facility]   Micro Report Status FINAL 04/29/2017   [facility]   Organism: >100,000 colonies/mL Klebsiella pneumoniae   [facility]            Basic metabolic panel [[id number]] (Abnormal)  Resulted: 04/29/17 1826, Result status: Final result    Ordering provider: Briana Ball MD  04/29/17 1200 Resulting lab: [facility]    Specimen Information    Type Source Collected On   Blood  04/29/17 1757          Components       Value Reference Range Flag Lab   Sodium 145 133 - 144 mmol/L H [facility]   Potassium 5.6 3.4 - 5.3 mmol/L H [facility]   Chloride 120 94 - 109 mmol/L H [facility]   Carbon Dioxide 14 20 - 32 mmol/L L [facility]   Anion Gap 11 3 - 14 mmol/L  [facility]   Glucose 172 70 - 99 mg/dL H [facility]   Urea Nitrogen 45 7 - 30 mg/dL H [facility]   Creatinine 1.92 0.52 - 1.04 mg/dL H [facility]   GFR Estimate 25 >60 mL/min/1.7m2 L [facility]   Comment:  Non  GFR Calc   GFR Estimate If Black 31 >60 mL/min/1.7m2 L [facility]   Comment:  African American GFR Calc   Calcium 7.5 8.5 - 10.1 mg/dL L [facility]            Magnesium [[id number]]  Resulted: 04/29/17 1826, Result status: Final result    Ordering provider: Arash Silva MD  04/29/17 1719 Resulting lab: [facility]    Specimen Information    Type Source Collected On   Blood  04/29/17 1757          Components       Value Reference Range Flag Lab   Magnesium 2.1 1.6 - 2.3 mg/dL  [facility]            Glucose by meter [[id number]] (Abnormal)  Resulted: 04/29/17 1725, Result status: Final result    Ordering provider: Briana Ball MD  04/29/17 1721 Resulting lab: POINT OF CARE TEST, GLUCOSE    Specimen Information    Type Source Collected On     04/29/17 1721          Components       Value Reference Range Flag Lab   Glucose 105 70 - 99 mg/dL H 170            Glucose by meter [[id number]]  Resulted: 04/29/17 1706,  Result status: Final result    Ordering provider: Briana Ball MD  04/29/17 1653 Resulting lab: POINT OF CARE TEST, GLUCOSE    Specimen Information    Type Source Collected On     04/29/17 1653          Components       Value Reference Range Flag Lab   Glucose 72 70 - 99 mg/dL  170            Glucose by meter [[id number]] (Abnormal)  Resulted: 04/29/17 1211, Result status: Final result    Ordering provider: Briana Ball MD  04/29/17 1152 Resulting lab: POINT OF CARE TEST, GLUCOSE    Specimen Information    Type Source Collected On     04/29/17 1152          Components       Value Reference Range Flag Lab   Glucose 144 70 - 99 mg/dL H 170            Lactic acid level STAT [[id number]]  Resulted: 04/29/17 1118, Result status: Final result    Ordering provider: Briana Ball MD  04/29/17 1106 Resulting lab: [facility]    Specimen Information    Type Source Collected On   Blood  04/29/17 1112          Components       Value Reference Range Flag Lab   Lactic Acid 0.9 0.7 - 2.1 mmol/L  [facility]            Glucose by meter [[id number]] (Abnormal)  Resulted: 04/29/17 0816, Result status: Final result    Ordering provider: Briana Ball MD  04/29/17 0755 Resulting lab: POINT OF CARE TEST, GLUCOSE    Specimen Information    Type Source Collected On     04/29/17 0755          Components       Value Reference Range Flag Lab   Glucose 129 70 - 99 mg/dL H 170            Basic metabolic panel [[id number]] (Abnormal)  Resulted: 04/29/17 0612, Result status: Final result    Ordering provider: Jose Bee MD  04/29/17 0001 Resulting lab: [facility]    Specimen Information    Type Source Collected On   Blood  04/29/17 0537          Components       Value Reference Range Flag Lab   Sodium 145 133 - 144 mmol/L H [facility]   Potassium 4.8 3.4 - 5.3 mmol/L  [facility]   Chloride 121 94 - 109 mmol/L H [facility]   Carbon Dioxide 15 20 - 32 mmol/L  L [facility]   Anion Gap 9 3 - 14 mmol/L  [facility]   Glucose 133 70 - 99 mg/dL H [facility]   Urea Nitrogen 40 7 - 30 mg/dL H [facility]   Creatinine 1.77 0.52 - 1.04 mg/dL H [facility]   GFR Estimate 28 >60 mL/min/1.7m2 L [facility]   Comment:  Non  GFR Calc   GFR Estimate If Black 34 >60 mL/min/1.7m2 L [facility]   Comment:  African American GFR Calc   Calcium 7.2 8.5 - 10.1 mg/dL L [facility]            INR [[id number]] (Abnormal)  Resulted: 04/29/17 0610, Result status: Final result    Ordering provider: Jian Hartley MD  04/29/17 0001 Resulting lab: [facility]    Specimen Information    Type Source Collected On   Blood  04/29/17 0537          Components       Value Reference Range Flag Lab   INR 5.13 0.86 - 1.14 HH [facility]   Comment:         Critical Value called to and read back by  SONAL NAGY IN MED SURG AT 0610 ON 4/29/17 DC              CBC with platelets differential [[id number]] (Abnormal)  Resulted: 04/29/17 0553, Result status: Final result    Ordering provider: Jose Bee MD  04/29/17 0001 Resulting lab: [facility]    Specimen Information    Type Source Collected On   Blood  04/29/17 0537          Components       Value Reference Range Flag Lab   WBC 12.9 4.0 - 11.0 10e9/L H [facility]   RBC Count 3.81 3.8 - 5.2 10e12/L  [facility]   Hemoglobin 9.0 11.7 - 15.7 g/dL L [facility]   Hematocrit 31.5 35.0 - 47.0 % L [facility]   MCV 83 78 - 100 fl  [facility]   MCH 23.6 26.5 - 33.0 pg L [facility]   MCHC 28.6 31.5 - 36.5 g/dL L [facility]   RDW 19.0 10.0 - 15.0 % H [facility]   Platelet Count 246 150 - 450 10e9/L  [facility]   Diff Method Automated Method   [facility]   % Neutrophils 84.6 %  [facility]   % Lymphocytes 10.0 %  [facility]   % Monocytes 4.2 %  [facility]   % Eosinophils 0.7 %  [facility]   % Basophils 0.3 %  [facility]   % Immature Granulocytes 0.2 %  [facility]   Absolute Neutrophil 10.9 1.6 - 8.3 10e9/L H [facility]   Absolute Lymphocytes 1.3 0.8 - 5.3 10e9/L  [facility]   Absolute  "Monocytes 0.5 0.0 - 1.3 10e9/L  [facility]   Absolute Eosinophils 0.1 0.0 - 0.7 10e9/L  [facility]   Absolute Basophils 0.0 0.0 - 0.2 10e9/L  [facility]   Abs Immature Granulocytes 0.0 0 - 0.4 10e9/L  [facility]            Testing Performed By     Lab - Abbreviation Name Director Address Valid Date Range    22 - [facility] Unknown [address] [place] [address] 05/08/15 1057 - Present    170 - Unknown POINT OF CARE TEST, GLUCOSE Unknown Unknown 10/31/11 1114 - Present            Unresulted Labs (24h ago through future)    Start       Ordered    04/29/17 0600  INR  (warfarin (COUMADIN) Pharmacy Consult-INITIAL ORDER)  DAILY,   Routine      04/28/17 1916    Unscheduled  Magnesium  (Magnesium Replacement - Adult - \"High\" - Replacement for all levels less than or equal to 2 mg/dL)  CONDITIONAL (SPECIFY),   Routine     Comments:  Obtain Magnesium Level for these conditions:  *IF no magnesium result within 24 hrs before initiation of order set, draw magnesium level with next lab collect.    *2 HOURS AFTER last magnesium replacement dose when magnesium replacement given for level less than 1.6  *Next morning after magnesium dose.     Repeat Magnesium Replacement if necessary.    04/29/17 1719      Encounter-Level Documents:     There are no encounter-level documents.      Order-Level Documents:     There are no order-level documents.      "

## 2017-04-28 NOTE — PROGRESS NOTES
S-(situation): Patient arrives to room 218 via cart from ER    B-(background): afib/flutter with confusion    A-(assessment): 's with /74, 2L 02 @ 95% patient is alert to person, place, not to time or situation. Legs are red, shiny, and warm to touch. Patient is very sleepy and falls sleep in midsentence.Skin; Insicion on left hip d/t surgery, multiple bruising on arms, legs kailash, shiny, weeping??, scab on LLE.      R-(recommendations): Orders reviewed with patiient. Will monitor patient per MD orders.     Inpatient nursing criteria listed below were met:    Health care directives status obtained and documented: Yes  Core Measures assessed (SSI): Yes  SCD's Documented: Yes  Vaccine assessment done and vaccines ordered if appropriate: Yes  Skin issues/needs documented:Yes  Isolation needs addressed, if appropriate: NA  Fall Prevention: Care plan updated, Education given and documented Yes  MRSA swab completed for patient 55 years and older (exclude SADIA and TKA): Yes  My Chart patient sign up addressed and documented: Yes  Care Plan initiated: Yes  Education Assessment documented:Yes  Education Documented (Pre-existing chronic infection such as, MRSA/VRE need education on admission): Yes  New medication patient education completed and documented (Possible Side Effects of Common Medications handout): Yes  Home medications if not able to send immediately home with family stored here: NA   Reminder note placed in discharge instructions: NA  Discharge planning review completed (admission navigator) Yes

## 2017-04-28 NOTE — IP AVS SNAPSHOT
` `           [address] SURGICAL: [phone]                 INTERAGENCY TRANSFER FORM - NOTES (H&P, Discharge Summary, Consults, Procedures, Therapies)   2017                    Hospital Admission Date: 2017  MARI BANKS   : 1940  Sex: Female        Patient PCP Information     Provider PCP Type    Dheeraj Jj MD General         History & Physicals      H&P by Jian Hartley MD at 2017  7:10 PM     Author:  Jina Hartley MD Service:  Hospitalist Author Type:  Physician    Filed:  2017  7:30 PM Date of Service:  2017  7:10 PM Note Created:  2017  7:16 PM    Status:  Signed :  Jian Hartley MD (Physician)         [facility]    History and Physical  Hospitalist       Date of Admission:  2017  Date of Service (when I saw the patient): 17    Assessment & Plan   Mari Banks is a 77 year old female who presents with rapid heart rate. She was found at home asleep on the toilet and when EMS examiner found her to be in atrial fibrillation with rapid response. On arrival to the emergency department she had a rate of 140, which did not respond to IV beta blocker and she has now been started on diltiazem infusion. She has a history of paroxysmal atrial fibrillation in the past taking Toprol and is on chronic Coumadin therapy. She lives alone, recovering from right hip surgery. She states that she fell asleep on the toilet and then awoke with these symptoms. She denies chest pain nausea or vomiting. She has noted some urinary frequency and the urinalysis is showing an UTI. She developed a fever after admission and continues with tachycardia and has a white blood cell count of 22,400. At this point she probably asepsis secondary to a UTI. Blood cultures in urine culture pending. She has been started on broad-spectrum antibiotics. Also noted is a red area over her left upper thigh  concerning for possible cellulitis. She is being admitted to the ICU with diltiazem infusion. Her blood pressures are borderline low so if not converting or becomes more symptomatic, may need to switch her to an amiodarone infusion. Expect she'll be in the hospital for at least 2 days.[WG1.1]     Principal Problem:    Atrial fibrillation with rapid ventricular response (H)    Assessment:[WG1.2] presenting issue with tachycardia in the 140s. As minimal complaints with no chest pain and only mild shortness of breath. Could be triggered by sepsis, although she has not taken her meds including Toprol today.[WG1.1]    Plan:[WG1.2] continue diltiazem infusion. If issues with blood pressure consider switching to amiodarone. Continue Coumadin prophylaxis.[WG1.1]  Active Problems:    Sepsis (H)    Assessment:[WG1.2] presenting with some weakness, and now has a fever with the above tachycardia, leukocytosis. Lactic acid is normal at 1.1 renal function is stable.[WG1.1]    Plan:[WG1.2] IV fluids having given. Monitor urine output and blood pressure.[WG1.1]    Acute cystitis without hematuria    Assessment:[WG1.2] having some dysuria without other symptoms.[WG1.1]    Plan:[WG1.2] cultures pending, currently on Zosyn and vancomycin[WG1.1]    Chronic kidney disease, stage III (moderate)    Assessment:[WG1.2] chronic, mildly elevated creatinine today[WG1.1]    Plan:[WG1.2] IV fluids, follow[WG1.1]    CAD - NSTEMI, CABG x 5 2007, angio in 2013 with patent grafts other than occluded graft to PL    Assessment:[WG1.2] no current symptoms, denies chest pain.[WG1.1]    Plan:[WG1.2] restart her Imdur and Toprol as able[WG1.1]    Type 2 diabetes mellitus with other circulatory complications (H)    Assessment:[WG1.2] taking glipizide, sugars typically in the 130 to 140 range[WG1.1]    Plan:[WG1.2] continue glipizide, will add sliding scale coverages well[WG1.1]    Essential hypertension with goal blood pressure less than 140/90     Assessment:[WG1.2] low pressures right now[WG1.1]    Plan:[WG1.2] Toprol and Imdur at this point.[WG1.1]    Restless leg syndrome    Assessment:[WG1.2] taking Requip[WG1.1]    Plan:[WG1.2] continue[WG1.1]    Sleep apnea    Assessment:[WG1.2] not using CPAP at this time[WG1.1]    Plan:[WG1.2] oxygen as needed to keep stats above 92%[WG1.1]    Long term current use of anticoagulant therapy    Assessment:[WG1.2] taking Coumadin for atrial fibrillation[WG1.1]    Plan:[WG1.2] continue[WG1.1]    Lymphedema    Assessment:[WG1.2] chronic issue, likely due to prolonged sitting[WG1.1]    Plan:[WG1.2] follow  DVT Prophylaxis: Warfarin  Code Status: Full Code    Disposition: Expected discharge in 2-3 days once heart rate controlled, feeling better.    Jian Hartley MD    Primary Care Physician   Dheeraj Jj    Chief Complaint   77-year-old with rapid heart rate    History is obtained from the patient, electronic health record, emergency department physician and patient's daughter    History of Present Illness   Kathryn Banks is a 77 year old female who presents with rapid heart rate secondary to atrial fibrillation. She lives alone at home, recovering from hip surgery in January. She states that she fell asleep on the commode last evening. Her home healthcare physical therapist stopped by this morning and could not get an answer from her. She was concerned, called EMS and ultimately they were able to get hold of the patient by phone and she came and opened the door. Evaluation by EMS on the scene was worrisome for a heart rate of 140. Patient was having some mild shortness of breath but denied any chest pain. She has history of intermittent paroxysmal atrial fibrillation. She takes chronic Coumadin and beta blocker, although states she is not sure if she took her medicines yesterday. She has history of a NSTEMI in 2007 followed by a quadruple bypass. She states that home she's not had the nausea or vomiting. She's  been more tired but denies any fever, chills, sweats. She notes some urinary frequency but no dysuria or hematuria. Her legs are chronically swollen, may be somewhat more red over the upper left thigh. She denies any open sores. She is diabetic taking glipizide, checking blood sugars typically in the 140 range.    Past Medical History    I have reviewed this patient's medical history and updated it with pertinent information if needed.   Past Medical History:   Diagnosis Date     Atrial fibrillation (H) 1/17/2007     Carpal tunnel syndrome      Coronary atherosclerosis of native coronary artery 2006    5 vessel CAD     OBSTRUCTIVE SLEEP APNEA     using CPAP     Osteoarthrosis, unspecified whether generalized or localized, unspecified site     generalized arthritis, particularly in her hands and feet         Other and combined forms of senile cataract 2000    Bilateral     Other and unspecified hyperlipidemia      RESTLESS LEGS SYNDROME      TENOSYNOV HAND/WRIST NEC 6/30/2006     Type II or unspecified type diabetes mellitus without mention of complication, not stated as uncontrolled 2001    Diabetes mellitus/pt is diet controlled with weight loss     Unspecified essential hypertension        Past Surgical History   I have reviewed this patient's surgical history and updated it with pertinent information if needed.  Past Surgical History:   Procedure Laterality Date     ANGIOPLASTY       C APPENDECTOMY       C CABG, VEIN, FIVE  12/06    SVG x 4 and LIMA to LAD     C SOTO W/O FACETEC FORAMOT/DSKC 1/2 VRT SEG, LUMBAR  1968     C REMV CATARACT INTRACAP,INSERT LENS      Bilateral     C TOTAL ABDOM HYSTERECTOMY  1995     - fibroids     C VAGOTOMY/PYLOROPLASTY,SELECT  1970     COLONOSCOPY  12/3/2007     COLONOSCOPY  1/17/2014    Procedure: COLONOSCOPY;  Colonoscopy;  Surgeon: Zack Stearns MD;  Location:  GI     [facility]  11/25/09    [facility]     ENDOSCOPY  03/21/2000    Upper GI     HC COLONOSCOPY THRU STOMA,  DIAGNOSTIC  10/7/04    poor prep, repeat in 2-3 years     HC COLONOSCOPY W/WO BRUSH/WASH  10/31/07    Repeat-poor quality prep     HC REMOVAL OF OVARY/TUBE(S)      Salpingo-Oophorectomy, Unilateral     HC REVISE MEDIAN N/CARPAL TUNNEL SURG      Carpal tunnel release     HC UGI ENDOSCOPY DIAG W BIOPSY  10/31/07     HC UGI ENDOSCOPY, SIMPLE EXAM  12/3/2007     OPEN REDUCTION INTERNAL FIXATION HIP NAILING Left 7/3/2016    Procedure: OPEN REDUCTION INTERNAL FIXATION HIP NAILING;  Surgeon: Lake Schulz MD;  Location: [facility] OR       Prior to Admission Medications   Prior to Admission Medications   Prescriptions Last Dose Informant Patient Reported? Taking?   ASPIRIN NOT PRESCRIBED (INTENTIONAL)   Yes No   Sig: Antiplatelet medication not prescribed intentionally due to Current anticoagulant therapy (warfarin/enoxaparin)   ORDER FOR DME   No No   Sig: Equipment being ordered: cpap machine, mask, humidifier, tubing and filters   calcium carbonate-vitamin D 500-400 MG-UNIT TABS tablt 2017 at 1000  No Yes   Sig: Take 1 tablet by mouth 3 times daily   cyanocobalamin (VITAMIN B12) 1000 MCG/ML injection   No Yes   Sig: Inject 1 mL (1,000 mcg) into the muscle every 30 days   ferrous sulfate (IRON) 325 (65 FE) MG tablet 2017 at 1000  No Yes   Sig: Take 1 tablet (325 mg) by mouth daily (with breakfast)   furosemide (LASIX) 40 MG tablet 2017 at 1000  Yes Yes   Si mg a day   gabapentin (NEURONTIN) 300 MG capsule 2017 at 2200  No Yes   Sig: Take 1 capsule (300 mg) by mouth 2 times daily   glipiZIDE (GLIPIZIDE XL) 10 MG 24 hr tablet 2017 at 1000  No Yes   Sig: Take 1 tablet (10 mg) by mouth daily   isosorbide mononitrate (IMDUR) 30 MG 24 hr tablet 2017 at 1000  Yes Yes   Sig: Take 3 tablets (90 mg) by mouth daily   metoprolol (TOPROL-XL) 100 MG 24 hr tablet 2017 at 1000  Yes Yes   Sig: Take 1.5 tablets (150 mg) by mouth daily   nitroglycerin (NITROSTAT) 0.4 MG SL tablet   No Yes   Sig: Place  1 tablet under the tongue See Admin Instructions. for chest pain   pramipexole (MIRAPEX) 1 MG tablet 2017 at 2200  No Yes   Sig: TAKE 3 TABLETS BY MOUTH EVERY EVENING   simvastatin (ZOCOR) 40 MG tablet 2017 at 1000  No Yes   Sig: TAKE ONE TABLET BY MOUTH EVERY NIGHT AT BEDTIME (NEEDS TO SCHEDULE FASTING LABS BEFORE ADDITIONAL REFILLS)   warfarin (COUMADIN) 2.5 MG tablet 2017 at 2200  No Yes   Sig: Take 7.5 mg (3 tablets) on Friday and 5 mg (2 tablets) all other days of the week, or as directed by the coumadin clinic      Facility-Administered Medications: None     Allergies   Allergies   Allergen Reactions     Ciprofloxacin Nausea and Vomiting     Zofran did not help     Oxycodone Visual Disturbance     Delusions, blackouts      Lisinopril Cough     Penicillins Rash     Sulfa Drugs GI Disturbance     LOSS OF TASTE       Social History   I have reviewed this patient's social history and updated it with pertinent information if needed. Kathryn Banks  reports that she quit smoking about 48 years ago. She quit after 10.00 years of use. She has never used smokeless tobacco. She reports that she drinks alcohol. She reports that she does not use illicit drugs.    Family History   I have reviewed this patient's family history and updated it with pertinent information if needed.   Family History   Problem Relation Age of Onset     DIABETES Father      AODM     Neurologic Disorder Father      Parkinson's     DIABETES Paternal Grandmother      adult onset     DIABETES Maternal Grandmother      adult onset     Hypertension No family hx of      CEREBROVASCULAR DISEASE No family hx of      Blood Disease Father       from blood clot from leg to lung immediately after hip fracture       Review of Systems   The 10 point Review of Systems is negative other than noted in the HPI or here.     Physical Exam   Temp: 100.6  F (38.1  C) Temp src: Oral BP: 123/77   Heart Rate: 151 Resp: 10 SpO2: 98 % O2 Device: Nasal  cannula with humidification Oxygen Delivery: 2 LPM  Vital Signs with Ranges  Temp:  [97.9  F (36.6  C)-102.5  F (39.2  C)] 100.6  F (38.1  C)  Heart Rate:  [128-151] 151  Resp:  [9-36] 10  BP: (109-168)/() 123/77  SpO2:  [91 %-100 %] 98 %  179 lbs 10.8 oz    EXAM:  Constitutional: alert, no distress and cooperative   Cardiovascular: irregular rapid rates, no murmur heard. Legs with some mild edema by chronic stasis changes.  Respiratory: clear to auscultation, no wheezing erred  Psychiatric: mentation appears normal and affect normal/bright  Head: Normocephalic. No masses, lesions, tenderness or abnormalities  Neck: Neck supple. No adenopathy. Thyroid symmetric, normal size,  ENT: ENT exam normal, no neck nodes or sinus tenderness  Abdomen: Abdomen soft, non-tender. BS normal. No masses, organomegaly  NEURO: nonfocal. Moving arms and legs equally. Cranial nerves intact  SKIN: chronic stasis changes in the lower legs. There is an area of redness and warmth involving the upper medial left thigh. No open sores  LYMPH: Normal cervical lymph nodes  JOINT/EXTREMITIES: extremities normal- no gross deformities noted and normal muscle tone     Data   Data reviewed today:  I personally reviewed the EKG tracing showing Atrial fibrillation with RVR and the chest x-ray image(s) showing No signs of infiltrate, CT head without acute changes, no bleeds..    Recent Labs  Lab 04/28/17  1050 04/26/17   WBC 22.4*  --    HGB 10.7*  --    MCV 82  --      --    INR 3.07* 2.0     --    POTASSIUM 4.5  --    CHLORIDE 117*  --    CO2 16*  --    BUN 35*  --    CR 1.44*  --    ANIONGAP 10  --    MALICK 7.9*  --    *  --    ALBUMIN 2.6*  --    PROTTOTAL 5.9*  --    BILITOTAL 0.3  --    ALKPHOS 242*  --    ALT 23  --    AST 13  --    TROPI 0.026  --        Recent Results (from the past 24 hour(s))   XR Chest 2 Views    Narrative    XR CHEST 2 VW 4/28/2017 11:01 AM    COMPARISON: 7/2/2016    HISTORY: Generalized weakness,  atrial fibrillation with rapid  ventricular response.      Impression    IMPRESSION: Cardiac silhouette and pulmonary vasculature are within  normal limits. No focal airspace disease, pleural effusion or  pneumothorax.    ANUP DURAND   CT Head w/o Contrast    Narrative    CT SCAN OF THE HEAD WITHOUT CONTRAST   4/28/2017 11:11 AM     HISTORY: Generalized weakness with confusion.    TECHNIQUE:  Axial images of the head and coronal reformations without  IV contrast material.  Radiation dose for this scan was reduced using  automated exposure control, adjustment of the mA and/or kV according  to patient size, or iterative reconstruction technique.    COMPARISON: 11/14/2012    FINDINGS: The ventricles are normal in size, shape, and configuration.  The brain parenchyma and subarachnoid spaces are within normal limits.  There is no evidence for intracranial hemorrhage, mass effect, acute  infarct, or skull fracture. Visualized paranasal sinuses and mastoid  air cells are clear.      Impression    IMPRESSION: Negative head CT without contrast.    EDUARDO AGUILAR MD   US Renal Limited Portable    Narrative    US RENAL LIMITED PORTABLE 4/28/2017 3:35 PM     HISTORY: Left flank pain.    COMPARISON: CT abdomen and pelvis 6/7/2012    FINDINGS: Exam is limited by patient's inability to completely  cooperate. Right kidney measures 8.4 cm in length and left kidney  measures 8.8 cm in length. This renal length is less than seen on the  prior CT exam, likely due to the fact that the kidneys could not be  completely visualized on the ultrasound. Renal parenchymal thickness  and echogenicity appears normal where visualized. No hydronephrosis,  mass lesions or abnormal calcifications bilaterally. Urinary bladder  was decompressed.      Impression    IMPRESSION: No significant abnormalities.    EVA SANTOYO MD[WG1.1]          Revision History        User Key Date/Time User Provider Type Action    > WG1.2 4/28/2017  7:30 PM Naeem  Jian Calero MD Physician Sign     WG1.1 4/28/2017  7:16 PM Jian Hartley MD Physician                      Discharge Summaries      Discharge Summaries by Jian Hartley MD at 5/2/2017 10:00 AM     Author:  Jian Hartley MD Service:  Hospitalist Author Type:  Physician    Filed:  5/2/2017 10:09 AM Date of Service:  5/2/2017 10:00 AM Note Created:  5/2/2017 10:02 AM    Status:  Signed :  Jian Hartley MD (Physician)         [facility]    Discharge Summary  Hospitalist    Date of Admission:  4/28/2017  Date of Discharge:  5/2/2017  Discharging Provider: Jian Hartley MD  Date of Service (when I saw the patient): 05/02/17    Discharge Diagnoses   Principal Problem:    UTI due to Klebsiella species  Active Problems:    Atrial fibrillation with rapid ventricular response (H)    Severe sepsis with acute organ dysfunction due to Gram negative bacteria (H)    Chronic kidney disease, stage III (moderate)    CAD - NSTEMI, CABG x 5 2007, angio in 2013 with patent grafts other than occluded graft to PL    Lymphedema    Anemia    Type 2 diabetes mellitus with other circulatory complications (H)    Acute kidney injury (H)    Essential hypertension with goal blood pressure less than 140/90    Rash - redness medial thighs    Supratherapeutic INR    Metabolic acidosis, normal anion gap (NAG)    Acute encephalopathy    Restless leg syndrome    Sleep apnea        History of Present Illness   Copied from H&P:  Kathryn Banks is a 77 year old female who presents with rapid heart rate secondary to atrial fibrillation. She lives alone at home, recovering from hip surgery in January. She states that she fell asleep on the commode last evening. Her home healthcare physical therapist stopped by this morning and could not get an answer from her. She was concerned, called EMS and ultimately they were able to get hold of the patient by phone and she came and opened  the door. Evaluation by EMS on the scene was worrisome for a heart rate of 140. Patient was having some mild shortness of breath but denied any chest pain. She has history of intermittent paroxysmal atrial fibrillation. She takes chronic Coumadin and beta blocker, although states she is not sure if she took her medicines yesterday. She has history of a NSTEMI in 2007 followed by a quadruple bypass. She states that home she's not had the nausea or vomiting. She's been more tired but denies any fever, chills, sweats. She notes some urinary frequency but no dysuria or hematuria. Her legs are chronically swollen, may be somewhat more red over the upper left thigh. She denies any open sores. She is diabetic taking glipizide, checking blood sugars typically in the 140 range.       Hospital Course   Kathryn Banks was admitted on 4/28/2017.  The following problems were addressed during her hospitalization:[WG1.1]    Principal Problem:    UTI due to Klebsiella species  Active Problems:    Atrial fibrillation with rapid ventricular response (H)    Severe sepsis with acute organ dysfunction due to Gram negative bacteria (H)    Chronic kidney disease, stage III (moderate)    CAD - NSTEMI, CABG x 5 2007, angio in 2013 with patent grafts other than occluded graft to PL    Lymphedema    Anemia    Type 2 diabetes mellitus with other circulatory complications (H)    Acute kidney injury (H)    Essential hypertension with goal blood pressure less than 140/90    Rash - redness medial thighs    Supratherapeutic INR    Metabolic acidosis, normal anion gap (NAG)    Acute encephalopathy    Restless leg syndrome    Sleep apnea[WG1.2]    77-year-old female who was found at home weak with a rapid heart rate due to atrial fibrillation. On admission had a rapid heart rate in the 140s and was initially treated with IV diltiazem for rate control. Lab work was concerning for acute kidney injury and labs demonstrated that she had a urinary tract  infection. She was initially septic with tachycardia, hypotension and leukocytosis. She was treated with IV fluids and started on broad-spectrum antibiotics including Zosyn and vancomycin. There was some concern whether she might have a cellulitis in her left leg with a history of chronic lymphedema and increased redness in the left upper thigh. She was admitted to the ICU. Her blood pressure did not tolerate the diltiazem drip and ultimately she was changed over to amiodarone with a load and then a slow loading infusion over 36 hours. She tolerated this well with her rate the controlled in the 80s. Urine culture came out with Klebsiella and she was switched over to oral cefdinir. She still remains somewhat weak and was determined that it would be best to discharge to a rehab facility for strengthening before going home. At discharge, her diabetes was being controlled with with insulin with her glipizide on hold due to renal insufficiency. This will need to be followed. She remains on Coumadin and this will be need to be monitored and adjusted as well.  Jian Hartley MD    Significant Results and Procedures   No procedures performed during this admission    Pending Results   These results [facility] Ordered in the Past 30 Days of this Admission     Date and Time Order Name Status Description    4/28/2017 1616 Blood culture Preliminary     4/28/2017 1616 Blood culture Preliminary           Code Status   Full Code       Primary Care Physician   Dheeraj Jj[WG1.1]    Physical Exam   Temp: 98.9  F (37.2  C) Temp src: Oral BP: 133/88 Pulse: 136 Heart Rate: 136 Resp: 16 SpO2: 96 % O2 Device: None (Room air)    Vitals:    04/30/17 1200 05/01/17 0529 05/02/17 0500   Weight: 88.5 kg (195 lb 1.7 oz) 79.5 kg (175 lb 4.3 oz) 85.9 kg (189 lb 6 oz)[WG1.3]     Vital Signs with Ranges[WG1.1]  Temp:  [98.4  F (36.9  C)-98.9  F (37.2  C)] 98.9  F (37.2   C)  Pulse:  [] 136  Heart Rate:  [110-136] 136  Resp:  [16-20] 16  BP: (126-137)/() 133/88  SpO2:  [96 %] 96 %  I/O last 3 completed shifts:  In: 1320 [P.O.:1320]  Out: 3460 [Urine:3460][WG1.3]    Constitutional: awake, alert, cooperative, no apparent distress, and appears stated age  Respiratory: No increased work of breathing, good air exchange, clear to auscultation bilaterally, no crackles or wheezing  Cardiovascular: irregular rhythm, rate controlled  GI: normal bowel sounds, soft, non-distended and non-tender  Skin: having some generalized stasis changes in her lower legs. Still somewhat tight up into the left medial thigh, but no redness and no tenderness and no bruising or bleeding  Musculoskeletal: full range of motion noted  tone is normal    Discharge Disposition   Discharged to short-term care facility  Condition at discharge: Good    Consultations This Hospital Stay   PHARMACY TO DOSE VANCO  PHARMACY TO DOSE WARFARIN  \[facility] [place] [facility]\"" HEALTH SERVICES IP CONSULT  PHYSICAL THERAPY ADULT IP CONSULT  PHYSICAL THERAPY ADULT IP CONSULT  OCCUPATIONAL THERAPY ADULT IP CONSULT    Time Spent on this Encounter   I, Jian Hartley MD, personally saw the patient today and spent greater than 30 minutes discharging this patient.    Discharge Orders     General info for SNF   Length of Stay Estimate: Short Term Care: Estimated # of Days <30  Condition at Discharge: Improving  Level of care:skilled   Rehabilitation Potential: Good  Admission H&P remains valid and up-to-date: Yes  Recent Chemotherapy: N/A  Use Nursing Home Standing Orders: Yes     Mantoux instructions   Give two-step Mantoux (PPD) Per Facility Policy Yes     Glucose monitor nursing POCT   Before meals and at bedtime     Follow Up and recommended labs and tests   Follow up with group home physician.  The following labs/tests are recommended: follow-up on  Renal function, potentially restart glipizide when resolved.     Activity - Up with  nursing assistance     Full Code     Physical Therapy Adult Consult   Evaluate and treat as clinically indicated.    Reason:  Weakness at home     Occupational Therapy Adult Consult   Evaluate and treat as clinically indicated.    Reason:  Weakness at home     Advance Diet as Tolerated   Follow this diet upon discharge: Orders Placed This Encounter     Room Service     Consistent Carbohydrate Diet [id number] Calories: Moderate Consistent CHO (4-6 CHO units/meal)       Discharge Medications   Current Discharge Medication List      START taking these medications    Details   !! insulin aspart (NOVOLOG PEN) 100 UNIT/ML injection Inject 1-7 Units Subcutaneous 3 times daily (with meals) Correction Scale - MEDIUM INSULIN RESISTANCE DOSING     Do Not give Correction Insulin if BG < 140.  For  - 189 give 1 unit.  For  - 239 give 2 units.  For  - 289 give 3 units.  For  - 339 give 4 units.  For BG = or > 340 give 5 units.  Check blood glucose before meals/bolus feedings and administer based on blood glucose.  Notify MD if glu > or = 350    Associated Diagnoses: Type 2 diabetes mellitus with other circulatory complications (H)      !! insulin aspart (NOVOLOG PEN) 100 UNIT/ML injection Inject 1-5 Units Subcutaneous At Bedtime Bedtime Blood Glucose (BG)  For  - 249 give 1 units.   For  - 299 give 2 units.   For  - 349 give 3 units.  For - 399 give 4 units.   For BG = or > 400 give 5 units.    Associated Diagnoses: Type 2 diabetes mellitus with other circulatory complications (H)      metoprolol (LOPRESSOR) 50 MG tablet Take 1 tablet (50 mg) by mouth 2 times daily  Qty: 60 tablet    Associated Diagnoses: Atrial fibrillation with rapid ventricular response (H)      cefdinir (OMNICEF) 300 MG capsule Take 1 capsule (300 mg) by mouth daily for 7 days  Refills: 0    Associated Diagnoses: Urinary tract infection without hematuria, site unspecified       !! - Potential duplicate  medications found. Please discuss with provider.      CONTINUE these medications which have CHANGED    Details   warfarin (COUMADIN) 2.5 MG tablet as directed by the coumadin clinic  Qty: 180 tablet, Refills: 0    Associated Diagnoses: Long-term (current) use of anticoagulants; Paroxysmal atrial fibrillation (H)      simvastatin (ZOCOR) 40 MG tablet TAKE ONE TABLET BY MOUTH EVERY NIGHT AT BEDTIME  Qty: 90 tablet, Refills: 3    Associated Diagnoses: Hypertension goal BP (blood pressure) < 140/90; Hyperlipidemia LDL goal <100      pramipexole (MIRAPEX) 1 MG tablet TAKE 3 TABLETS BY MOUTH EVERY EVENING  Qty: 270 tablet, Refills: 2    Associated Diagnoses: Restless legs syndrome (RLS); Hyperlipidemia LDL goal <100; Hypertension goal BP (blood pressure) < 140/90      furosemide (LASIX) 40 MG tablet 40 mg a day  Qty: 90 tablet, Refills: 3    Associated Diagnoses: Lymphedema         CONTINUE these medications which have NOT CHANGED    Details   cyanocobalamin (VITAMIN B12) 1000 MCG/ML injection Inject 1 mL (1,000 mcg) into the muscle every 30 days  Qty: 1 mL, Refills: 11    Associated Diagnoses: Vitamin B12 deficiency (non anemic)      isosorbide mononitrate (IMDUR) 30 MG 24 hr tablet Take 3 tablets (90 mg) by mouth daily  Qty: 30 tablet, Refills: 2    Associated Diagnoses: Hx of non-ST elevation myocardial infarction (NSTEMI)      gabapentin (NEURONTIN) 300 MG capsule Take 1 capsule (300 mg) by mouth 2 times daily  Qty: 180 capsule, Refills: 1    Associated Diagnoses: Restless leg syndrome      ferrous sulfate (IRON) 325 (65 FE) MG tablet Take 1 tablet (325 mg) by mouth daily (with breakfast)  Qty: 100 tablet    Associated Diagnoses: Anemia, unspecified type      calcium carbonate-vitamin D 500-400 MG-UNIT TABS tablt Take 1 tablet by mouth 3 times daily  Qty: 180 tablet, Refills: 3    Associated Diagnoses: Closed intertrochanteric fracture of left hip, initial encounter (H)      nitroglycerin (NITROSTAT) 0.4 MG SL tablet  Place 1 tablet under the tongue See Admin Instructions. for chest pain  Qty: 25 tablet, Refills: 0    Associated Diagnoses: Postsurgical aortocoronary bypass status      ASPIRIN NOT PRESCRIBED (INTENTIONAL) Antiplatelet medication not prescribed intentionally due to Current anticoagulant therapy (warfarin/enoxaparin)  Qty: 0 each, Refills: 0    Associated Diagnoses: Type 2 diabetes mellitus with other circulatory complications (H)      ORDER FOR DME Equipment being ordered: cpap machine, mask, humidifier, tubing and filters  Qty: 1 Device, Refills: 0    Associated Diagnoses: ANABEL (obstructive sleep apnea)         STOP taking these medications       metoprolol (TOPROL-XL) 100 MG 24 hr tablet Comments:   Reason for Stopping:         glipiZIDE (GLIPIZIDE XL) 10 MG 24 hr tablet Comments:   Reason for Stopping:             Allergies   Allergies   Allergen Reactions     Ciprofloxacin Nausea and Vomiting     Zofran did not help     Oxycodone Visual Disturbance     Delusions, blackouts      Lisinopril Cough     Penicillins Rash     Sulfa Drugs GI Disturbance     LOSS OF TASTE     Data   Most Recent 3 CBC's:[WG1.1]  Recent Labs   Lab Test  05/01/17   0518  04/30/17   0536  04/29/17   0537  04/28/17   1050   WBC   --   8.3  12.9*  22.4*   HGB  8.8*  8.7*  9.0*  10.7*   MCV   --   82  83  82   PLT   --   246  246  283[WG1.4]      Most Recent 3 BMP's:[WG1.1]  Recent Labs   Lab Test  05/02/17   0530  05/01/17   0518  04/30/17   0536   NA  147*  146*  144   POTASSIUM  4.3  4.7  4.6   CHLORIDE  121*  122*  121*   CO2  16*  12*  13*   BUN  37*  48*  45*   CR  1.71*  1.93*  1.82*   ANIONGAP  10  12  10   MALICK  7.3*  7.2*  7.2*   GLC  147*  174*  74[WG1.4]     Most Recent 2 LFT's:[WG1.1]  Recent Labs   Lab Test  04/28/17   1050  03/31/17   1510   AST  13  17   ALT  23  19   ALKPHOS  242*  263*   BILITOTAL  0.3  0.3[WG1.4]     Most Recent INR's and Anticoagulation Dosing History:  Anticoagulation Dose History     Recent Dosing and Labs  Latest Ref Rng & Units 4/18/2017 4/26/2017 4/28/2017 4/29/2017 4/30/2017 5/1/2017 5/2/2017    Warfarin 2.5 mg - - - 2.5 mg - - - -    INR 0.86 - 1.14 1.9 2.0 3.07(H) 5.13(HH) 4.64(H) 3.41(H) 2.44(H)    INR 0.86 - 1.14 - - - - - - -    INR Point of Care 0.86 - 1.14 - - - - - - -        Most Recent 3 Troponin's:[WG1.1]  Recent Labs   Lab Test  04/28/17   1050  07/02/16   0125  03/23/15   1626   02/22/14   0024   TROPI  0.026  0.019  <0.015  The 99th percentile for upper reference range is 0.045 ug/L.  Troponin values in   the range of 0.045 - 0.120 ug/L may be associated with risks of adverse   clinical events.   Effective 7/30/2014, the reference range for this assay has changed to reflect   new instrumentation/methodology.     < >   --    TROPONIN   --    --    --    --   0.01    < > = values in this interval not displayed.[WG1.4]     Most Recent Cholesterol Panel:[WG1.1]  Recent Labs   Lab Test  04/12/16   1132   CHOL  127   LDL  47   HDL  65   TRIG  73[WG1.4]     Most Recent 6 Bacteria Isolates From Any Culture (See EPIC Reports for Culture Details):[WG1.1]  Recent Labs   Lab Test  04/28/17   1747  04/28/17   1624  04/28/17   1621  04/28/17   1130  07/02/16   2020  07/02/16 2015   CULT  No MRSA isolated  No growth after 4 days  No growth after 4 days  >100,000 colonies/mL Klebsiella pneumoniae*  No growth after 6 days  No growth after 6 days[WG1.4]     Most Recent TSH, T4 and A1c Labs:[WG1.1]  Recent Labs   Lab Test  04/28/17   1050  03/31/17   1510   TSH  0.75   --    A1C   --   6.2*     Results for orders placed or performed during the hospital encounter of 04/28/17   XR Chest 2 Views    Narrative    XR CHEST 2 VW 4/28/2017 11:01 AM    COMPARISON: 7/2/2016    HISTORY: Generalized weakness, atrial fibrillation with rapid  ventricular response.      Impression    IMPRESSION: Cardiac silhouette and pulmonary vasculature are within  normal limits. No focal airspace disease, pleural effusion  or  pneumothorax.    ANUP DURAND   CT Head w/o Contrast    Narrative    CT SCAN OF THE HEAD WITHOUT CONTRAST   4/28/2017 11:11 AM     HISTORY: Generalized weakness with confusion.    TECHNIQUE:  Axial images of the head and coronal reformations without  IV contrast material.  Radiation dose for this scan was reduced using  automated exposure control, adjustment of the mA and/or kV according  to patient size, or iterative reconstruction technique.    COMPARISON: 11/14/2012    FINDINGS: The ventricles are normal in size, shape, and configuration.  The brain parenchyma and subarachnoid spaces are within normal limits.  There is no evidence for intracranial hemorrhage, mass effect, acute  infarct, or skull fracture. Visualized paranasal sinuses and mastoid  air cells are clear.      Impression    IMPRESSION: Negative head CT without contrast.    EDUARDO AGUILAR MD   US Renal Limited Portable    Narrative    US RENAL LIMITED PORTABLE 4/28/2017 3:35 PM     HISTORY: Left flank pain.    COMPARISON: CT abdomen and pelvis 6/7/2012    FINDINGS: Exam is limited by patient's inability to completely  cooperate. Right kidney measures 8.4 cm in length and left kidney  measures 8.8 cm in length. This renal length is less than seen on the  prior CT exam, likely due to the fact that the kidneys could not be  completely visualized on the ultrasound. Renal parenchymal thickness  and echogenicity appears normal where visualized. No hydronephrosis,  mass lesions or abnormal calcifications bilaterally. Urinary bladder  was decompressed.      Impression    IMPRESSION: No significant abnormalities.    EVA SANTOYO MD     *Note: Due to a large number of results and/or encounters for the requested time period, some results have not been displayed. A complete set of results can be found in Results Review.[WG1.4]            Revision History        User Key Date/Time User Provider Type Action    > WG1.4 5/2/2017 10:09 AM Jian Hartley MD  Physician Sign     WG1.3 5/2/2017 10:04 AM Jian Hartley MD Physician      WG1.2 5/2/2017 10:03 AM Jian Hartley MD Physician      WG1.1 5/2/2017 10:02 AM Jian Hartley MD Physician                      Consult Notes      Consults by Sofie Ceja at 4/30/2017 10:52 AM     Author:  Sofie Ceja Service:  (none) Author Type:      Filed:  4/30/2017 10:52 AM Date of Service:  4/30/2017 10:52 AM Note Created:  4/30/2017 10:48 AM    Status:  Signed :  Sfoie Ceja ()         Care Transition Initial Assessment -   Reason For Consult: discharge planning  Met with: Patient    Principal Problem:    UTI due to Klebsiella species  Active Problems:    Restless leg syndrome    Sleep apnea    Chronic kidney disease, stage III (moderate)    CAD - NSTEMI, CABG x 5 2007, angio in 2013 with patent grafts other than occluded graft to PL    Lymphedema    Anemia    Type 2 diabetes mellitus with other circulatory complications (H)    Acute kidney injury (H)    Essential hypertension with goal blood pressure less than 140/90    Atrial fibrillation with rapid ventricular response (H)    Severe sepsis with acute organ dysfunction due to Gram negative bacteria (H)    Cellulitis of leg without foot    Supratherapeutic INR    Metabolic acidosis, normal anion gap (NAG)         DATA  Lives With: alone  Living Arrangements: house  Description of Support System: Involved, Supportive  Who is your support system?: Children  Support Assessment: Adequate family and caregiver support  Identified issues/concerns regarding health management:          ASSESSMENT  Cognitive Status:  awake, alert and oriented  Concerns to be addressed: Patient remains in ICU.  Patient reports feeling weak.  P/T to start working with patient tomorrow.  Currently patient has [facility].  She may need TCU.  Patient states her  is at [facility] (Admissions: [phone] Main Phone:  [phone] Fax: [phone]) She would like referral sent there so she could be by her .  Referral sent.     PLAN  Financial costs for the patient includes Private room fee if she goes to TCU at [facility] - this was explained to patient   Patient given options and choices for discharge:  Yes  Patient/family is agreeable to the plan?  Yes  Patient Goals and Preferences: Return home if able  Patient anticipates discharging to:  [facility]    Sofie Ceja [facility] [phone]/ [facility] [phone][JK1.1]       Revision History        User Key Date/Time User Provider Type Action    > JK1.1 4/30/2017 10:52 AM Sofie Ceja  Sign            Consults by Ricardo Noriega RPH at 4/28/2017  7:33 PM     Author:  Ricardo Noriega RPH Service:  Pharmacy Author Type:  Pharmacist    Filed:  4/28/2017  7:34 PM Date of Service:  4/28/2017  7:33 PM Note Created:  4/28/2017  7:33 PM    Status:  Signed :  Ricardo Noriega RPH (Pharmacist)     Consult Orders:    1. Pharmacy to dose warfarin [[id number]] ordered by Jian Hartley MD at 04/28/17 1916                Clinical Pharmacy - Warfarin Dosing Consult     Pharmacy has been consulted to manage this patient s warfarin therapy.  Indication: Atrial Fibrillation  Therapy Goal: INR 2-3  Warfarin Prior to Admission: Yes  Warfarin PTA Regimen: 7.5mg Fri, 5mg all other days of the week  Significant drug interactions: APAP, ceftriaxone, norco, vancomycin  Recent documented change in oral intake/nutrition: Unknown    INR   Date Value Ref Range Status   04/28/2017 3.07 (H) 0.86 - 1.14 Final   04/26/2017 2.0  Final       Recommend warfarin 2.5 mg today.  Pharmacy will monitor Kathryn Banks daily and order warfarin doses to achieve specified goal.      Please contact pharmacy as soon as possible if the warfarin needs to be held for a procedure or if the warfarin goals change.      Ricardo Noriega PharmD. Pharmacy Resident[SB1.1]         Revision History        User  Key Date/Time User Provider Type Action    > SB1.1 2017  7:34 PM Ricardo Noriega RPH Pharmacist Sign            Consults by Clay Carrillo RPH at 2017  6:50 PM     Author:  Clay Carrillo RPH Service:  Pharmacy Author Type:  Pharmacist    Filed:  2017  6:51 PM Date of Service:  2017  6:50 PM Note Created:  2017  6:50 PM    Status:  Signed :  Caly Carrillo RPH (Pharmacist)     Consult Orders:    1. Vancomycin Pharmacy to dose ([facility], [facility], [facility]) [[id number]] ordered by Jose Bee MD at 17 1616                Pharmacy Vancomycin Initial Note  Date of Service 2017  Patient's  1940  77 year old, female    Indication: Skin and Soft Tissue Infection    Current estimated CrCl = Estimated Creatinine Clearance: 35.6 mL/min (based on Cr of 1.44).    Creatinine for last 3 days  2017: 10:50 AM Creatinine 1.44 mg/dL    Recent Vancomycin Level(s) for last 3 days  No results found for requested labs within last 72 hours.      Vancomycin IV Administrations (past 72 hours)                   vancomycin 1250 mg in 0.9% NaCl 250 mL PREMIX (mg) 1,250 mg New Bag 17 1742                Nephrotoxins and other renal medications (Future)    Start     Dose/Rate Route Frequency Ordered Stop    17 1655  vancomycin 1250 mg in 0.9% NaCl 250 mL PREMIX      1,250 mg Intravenous EVERY 24 HOURS 17 1654            Contrast Orders - past 72 hours     None                Plan:  1.  Start vancomycin  1250 mg IV q24h.   2.  Goal Trough Level: 10-15 mg/L   3.  Pharmacy will check trough levels as appropriate in 1-3 Days.    4. Serum creatinine levels will be ordered daily for the first week of therapy and at least twice weekly for subsequent weeks.    5. [place] method utilized to dose vancomycin therapy: Method 2    Clay Carrillo, PharmD[PN1.1]       Revision History        User Key Date/Time User Provider Type Action    > PN1.1 2017  6:51 PM Clay Carrillo RPH Pharmacist Sign                      Progress Notes - Physician (Notes from 04/29/17 through 05/02/17)      Progress Notes by Sofie Ceja at 5/1/2017  2:06 PM     Author:  Sofie Ceja Service:  (none) Author Type:      Filed:  5/1/2017  3:05 PM Date of Service:  5/1/2017  2:06 PM Note Created:  5/1/2017  2:06 PM    Status:  Addendum :  Sofie Ceja ()         Patient accepted at [facility] (Admissions: [phone] Main Phone: [phone] Fax: [phone]).[JK1.1]  Patient is aware of the private room fee.[JK1.2]    BON Simms  [facility] [phone]/ [place] [phone][JK1.1]       Revision History        User Key Date/Time User Provider Type Action    > JK1.2 5/1/2017  3:05 PM Sofie Ceja  Addend     JK1.1 5/1/2017  2:07 PM Sofie Ceja  Sign            Progress Notes by Arash Silva MD at 5/1/2017 10:59 AM     Author:  Arash Silva MD Service:  Hospitalist Author Type:  Physician    Filed:  5/1/2017  2:52 PM Date of Service:  5/1/2017 10:59 AM Note Created:  5/1/2017 10:59 AM    Status:  Signed :  Arash Silva MD (Physician)         [facility]    Hospitalist Progress Note    Date of Service (when I saw the patient): 05/01/2017    Assessment & Plan   Kathryn Banks is a 77 year old female who was admitted on 4/28/2017[JK1.1] with sepsis due to Klebsiella UTI which was also associated with rapid atrial fibrillation, hypotension and acute kidney injury.  She continues to gradually improve.[JK1.2]    Principal Problem:    UTI due to Klebsiella species    Assessment:[JK1.3] Klebsiella pneumoniae isolated on urine culture, continuing to improve after transition to oral Cefdinir today[JK1.2]    Plan:[JK1.3] Continue antibiotics to complete a course of treatment[JK1.2]    Active Problems:    Severe sepsis with acute organ dysfunction due to Gram negative bacteria (H)    Assessment:[JK1.3] Fever,  leukocytosis, tachycardia, hypotension, elevated lactic acid level, acute kidney injury, and acute encephalopathy are all consistent with severe sepsis with acute organ dysfunction, has improved with antibiotics and IV fluid therapy and did not require vasopressors but transiently deteriorated after attempted IV diltiazem therapy of rapid atrial fibrillation, signs of sepsis are gradually improving[JK1.4]    Plan:[JK1.3] IV fluids discontinued, continue oral antibiotic to complete a course of treatment, advance diet and activity, PT evaluation to assist with disposition planning[JK1.4]      Lymphedema    Assessment:[JK1.3] Chronic and stable, probably contributed to the redness noted in the skin of the thighs at admission[JK1.4]    Plan:[JK1.3] Continue chronic treatments[JK1.4]      Anemia    Assessment:[JK1.3] Chronic and stable, normally taking iron[JK1.4]    Plan:[JK1.3] Resume iron therapy at discharge[JK1.4]      Type 2 diabetes mellitus with other circulatory complications (H)    Assessment:[JK1.3] Adequate glycemic control, blood sugars trended downward as she continued glipizide initially probably due to acute kidney injury with decreased renal clearance of glipizide, blood sugars remained stable after discontinuation of glipizide yesterday because of concern for hypoglycemia[JK1.4]    Plan:[JK1.3] Follow blood sugars, continue NovoLog correction scale and titrate insulin as needed to optimize glycemic control, would not restart glipizide until renal function recovers[JK1.4]      Acute kidney injury (H)    Assessment:[JK1.3] Creatinine persistently 1.7-1.9 which is higher than her usual baseline of 1.4-1.5 and consistent with acute kidney injury, also had oliguria and transient hyperkalemia both of which have subsided with loop diuretic administration, continues to have metabolic acidosis with normal anion gap probably due to acute kidney injury and lack of bicarbonate regeneration and metabolic acidosis  appears to be worsening[JK1.5]    Plan:[JK1.3] Start bicarbonate replacement today and follow acid base status closely, continue to follow renal function and urine output closely, adjust medications including gabapentin dosing for renal dysfunction[JK1.5]      Atrial fibrillation with rapid ventricular response (H)    Assessment:[JK1.3] Likely precipitated by sepsis although she has atrial fibrillation chronically, rate has been difficult to control in the context of sepsis and low blood pressure but hypotension is most likely due to sepsis rather than atrial fibrillation, clinically improved with starting new amiodarone therapy although heart rate is trending upward today after discontinuation of IV amiodarone infusion but she has not yet restarted her beta blocker and had been taking Toprol- mg daily previously[JK1.5]    Plan:[JK1.3] Continue oral amiodarone, restart low dose beta blocker and titrate upward as blood pressure tolerates to optimize heart rate control, taking warfarin chronically[JK1.5]      Rash - redness medial thighs    Assessment:[JK1.3] Initial concern for possible cellulitis but clinical course has not been concerning for cellulitis, had clinical history of sitting on her toilet and falling asleep in the hours prior to admission which may account for the redness noted on her thighs[JK1.5]    Plan:[JK1.3] Follow clinically[JK1.5]      Supratherapeutic INR    Assessment:[JK1.3] Due to a combination of antibiotics and amiodarone combined with chronic warfarin therapy[JK1.5]    Plan:[JK1.3] Pharmacy managing warfarin dosing, anticipate no warfarin today, follow INR closely[JK1.5]      Metabolic acidosis, normal anion gap (NAG)    Assessment:[JK1.3] Worsening today presumed due to worsening renal function[JK1.5]    Plan:[JK1.3] Start oral bicarbonate replacement and follow acid base status closely[JK1.5]      Acute encephalopathy    Assessment:[JK1.3] Presented at admission with clinical  history of falling asleep on the toilet and confusion and disorientation at home noted by her home physical therapist as well as EMS consistent with an initial acute encephalopathy, probably due to sepsis, has not had focal neurologic deficits or seizures[JK1.5]    Plan:[JK1.3] Continue symptomatic treatment[JK1.5]      Restless leg syndrome    Assessment:[JK1.3] Chronic and stable[JK1.5]    Plan:[JK1.3] No change[JK1.5]      Sleep apnea    Assessment:[JK1.3] Chronic[JK1.5]    Plan:[JK1.3] No change[JK1.5]      Chronic kidney disease, stage III (moderate)    Assessment:[JK1.3] Worsening renal function from baseline[JK1.5]    Plan:[JK1.3] As noted above[JK1.5]      CAD - NSTEMI, CABG x 5 2007, angio in 2013 with patent grafts other than occluded graft to PL    Assessment:[JK1.3] Chronic and stable without angina during this hospital stay[JK1.5]    Plan:[JK1.3] Continue chronic medications[JK1.5]      Essential hypertension with goal blood pressure less than 140/90    Assessment:[JK1.3] Low blood pressure readings during this hospital stay[JK1.5]    Plan:[JK1.3] As summarized above[JK1.5]    DVT Prophylaxis: Warfarin  Code Status: Full Code    Disposition: Expected discharge in 1-3 days once[JK1.1] rate of atrial fibrillation,[JK1.5] renal function and[JK1.1] metabolic[JK1.5] acidosis stabilize[JK1.1], probable discharge to skilled nursing facility for anticipated short-term rehabilitation[JK1.5].    Arash Silva MD    Interval History[JK1.1]   She feels better today although remains weak.  She feels a bit dizzy when she is up.  She denies any chest pain or shortness of breath.  She has not had fever.  Heart rate is trended upward after IV amiodarone was discontinued late yesterday.  She was started on oral amiodarone overnight.  Blood pressure has fluctuated with intermittent but not persistent low blood pressure readings.  Oxygenation has been stable.  She is tolerating good oral intake.  Urine output has been  adequate.  She has been moving her bowels.  She has been able to ambulate with assistance using a walker.  She expresses intent to discharge to a skilled nursing facility for rehabilitation as advised by PT and reports that her spouse is currently at [facility] so would be her preference to discharge to that facility for rehabilitation if available.    Additional history is obtained from the patient.  She remembers falling asleep while sitting on the toilet at home although she is not sure why she fell asleep.  She does not remember being confused at home although EMS report alludes to confusion.  She remembers her home physical therapist found her with decreased responsiveness at home and had called for help.[JK1.5]    -Data reviewed today: I reviewed all new labs and imaging results over the last 24 hours. I personally reviewed telemetry[JK1.1] which demonstrates persistent atrial fibrillation but no other dysrhythmias[JK1.5].    Physical Exam   Temp: 97.8  F (36.6  C) Temp src: Oral BP: 109/82   Heart Rate: 112 Resp: 14 SpO2: 98 % O2 Device: None (Room air)    Vitals:    04/28/17 1720 04/30/17 1200 05/01/17 0529   Weight: 81.5 kg (179 lb 10.8 oz) 88.5 kg (195 lb 1.7 oz) 79.5 kg (175 lb 4.3 oz)     Vital Signs with Ranges  Temp:  [97.5  F (36.4  C)-97.8  F (36.6  C)] 97.8  F (36.6  C)  Heart Rate:  [] 112  Resp:  [10-38] 14  BP: ()/() 109/82  SpO2:  [93 %-98 %] 98 %  I/O last 3 completed shifts:  In: 2879 [P.O.:1520; I.V.:1359]  Out: 1700 [Urine:1700]    Constitutional:[JK1.1] No acute distress sitting in a chair[JK1.5]  Respiratory:[JK1.1] Normal respiratory effort, diminished breath sounds at the bases, few crackles, no wheezes[JK1.5]  Cardiovascular:[JK1.1] Irregularly irregular tachycardic heart rate and rhythm, good radial pulse with normal capillary refill[JK1.5]  Neuro:[JK1.1] Alert and maintains wakefulness and attention, no overt confusion although reliability of her  memory for the events on the day of hospital admission is questionable[JK1.5]    Medications     Warfarin Therapy Reminder         sodium bicarbonate  1,300 mg Oral 4x Daily     cefdinir  300 mg Oral Daily     amiodarone  200 mg Oral BID     gabapentin  300 mg Oral BID     nitroglycerin  0.4 mg Sublingual See Admin Instructions     pramipexole  1 mg Oral At Bedtime     insulin aspart  1-7 Units Subcutaneous TID AC     insulin aspart  1-5 Units Subcutaneous At Bedtime       Data   Data reviewed today:  I personally reviewed[JK1.1] telemetry[JK1.5].    Recent Labs  Lab 05/01/17  0518 04/30/17  0536 04/29/17  1757 04/29/17  0537 04/28/17  1050   WBC  --  8.3  --  12.9* 22.4*   HGB 8.8* 8.7*  --  9.0* 10.7*   MCV  --  82  --  83 82   PLT  --  246  --  246 283   INR 3.41* 4.64*  --  5.13* 3.07*   * 144 145* 145* 143   POTASSIUM 4.7 4.6 5.6* 4.8 4.5   CHLORIDE 122* 121* 120* 121* 117*   CO2 12* 13* 14* 15* 16*   BUN 48* 45* 45* 40* 35*   CR 1.93* 1.82* 1.92* 1.77* 1.44*   ANIONGAP 12 10 11 9 10   MALICK 7.2* 7.2* 7.5* 7.2* 7.9*   * 74 172* 133* 190*   ALBUMIN  --   --   --   --  2.6*   PROTTOTAL  --   --   --   --  5.9*   BILITOTAL  --   --   --   --  0.3   ALKPHOS  --   --   --   --  242*   ALT  --   --   --   --  23   AST  --   --   --   --  13   TROPI  --   --   --   --  0.026[JK1.1]     Blood cultures remain negative to date[JK1.5]  Nasal culture for MRSA was negative[JK1.6]  Lactic acid 0.6[JK1.5]  Blood sugars have ranged  over the last 24 hours  Venous blood gas pH 7.22, PCO2 29, bicarbonate 12 today[JK1.6]       Revision History        User Key Date/Time User Provider Type Action    > JK1.6 5/1/2017  2:52 PM Arash Silva MD Physician Sign     JK1.5 5/1/2017  2:41 PM Arash Silva MD Physician      JK1.4 5/1/2017  2:37 PM Arash Silva MD Physician      JK1.2 5/1/2017  2:35 PM Arash Silva MD Physician      JK1.3 5/1/2017 12:11 PM Arash Silva MD Physician      JK1.1 5/1/2017 10:59  Arash Red MD Physician             Progress Notes by Arlet Palumbo PT at 5/1/2017 12:05 PM     Author:  Arlet Palumbo PT Service:  (none) Author Type:  Physical Therapist    Filed:  5/1/2017 12:05 PM Date of Service:  5/1/2017 12:05 PM Note Created:  5/1/2017 12:05 PM    Status:  Signed :  Arlet Palumbo PT (Physical Therapist)          05/01/17 1000   Quick Adds   Type of Visit Initial PT Evaluation   Living Environment   Lives With alone   Living Arrangements house   Home Accessibility tub/shower is not walk in   Number of Stairs to Enter Home 0   Number of Stairs Within Home 0   Transportation Available family or friend will provide   Living Environment Comment Patients  has Parkinsons disease and is now at [facility] since 2 week, for Short term rehab following a hospital stay.   Self-Care   Usual Activity Tolerance fair   Current Activity Tolerance fair   Regular Exercise yes   Activity/Exercise Type strength training;walking   Equipment Currently Used at Home raised toilet;walker, rolling;wheelchair;shower chair   Activity/Exercise/Self-Care Comment Home PT three times per week, does HEP every day otherwise supine exercise program   Functional Level Prior   Ambulation 1-->assistive equipment   Transferring 1-->assistive equipment   Toileting 1-->assistive equipment   Bathing 0-->independent   Dressing 0-->independent   Eating 0-->independent   Communication 0-->understands/communicates without difficulty   Swallowing 0-->swallows foods/liquids without difficulty   Cognition 0 - no cognition issues reported   Fall history within last six months yes   Number of times patient has fallen within last six months 1   Which of the above functional risks had a recent onset or change? fall history   Prior Functional Level Comment Weaker than her normal self per report   General Information   Onset of Illness/Injury or Date of Surgery - Date 04/28/17   Referring  Physician Dr Silva   Patient/Family Goals Statement improve activity tolerance   Pertinent History of Current Problem (include personal factors and/or comorbidities that impact the POC) Kathryn Banks is a 77 year old female who was admitted on 4/28/2017. Klebsiella pneumoniae UTI has been identified,with sepsis and organ dysfunction this admission noted. Also history of falls with L hip fracture and ORIF, having some difficulty healing this overall and was seeking towards possible SADIA vs hemiarthroplasty. She also has type 2 diabetes, kidney disease, + cardiac history, sleep apnea and hypertension. She was found on her toilet by EMS after her home PT could not enter her home, and she didnt answer the door. She did note home PT was coming after her hip surgery from February, in which she had her previous hardware exchanged from her previous ORIF.    Precautions/Limitations fall precautions   Weight-Bearing Status - LUE weight-bearing as tolerated   Weight-Bearing Status - RUE weight-bearing as tolerated   Weight-Bearing Status - LLE weight-bearing as tolerated   Weight-Bearing Status - RLE weight-bearing as tolerated   General Observations  at rest in chair on telemetry.    General Info Comments Was driving, walking in the home with walker (2WW)   Cognitive Status Examination   Orientation orientation to person, place and time   Level of Consciousness alert   Follows Commands and Answers Questions able to follow multistep instructions;able to follow single-step instructions;100% of the time   Cognitive Comment States she is a bit foggy minded with this infection.    Pain Assessment   Patient Currently in Pain No   Integumentary/Edema   Integumentary/Edema Comments Edema present in reagan LE   Posture    Posture Comments Min thoracic kyphosis.    Range of Motion (ROM)   ROM Comment Reduced L hip ROM s/p L hip surgery in February and last fall. B UE ROM WFL. R LE WFL. B ankle DF tight   Strength   Strength  Comments LE MMT: 4/5 R hip flexion, 3-/5 L hip s/p surgery, R knee ext and knee flexion 5/5, 3+/5 L quad and knee pain, 4/5 HS. Ankle DF L 5/5, R 5/5.  Iso adduction R 4+/5, 4/5 L, abduction 4+/5 R, 4/5 L   Bed Mobility   Bed Mobility Comments Not assessed today but pt reports no major impairments, SBA with nursing   Transfer Skills   Transfer Comments Sit to stand and stand to sit with CGA. No verbal cues required for hand placement. Pt kicks out L LE independently to protect hip.    Gait   Gait Comments Pt ambulates with FWW and CGA. Ambulates 50 ft with one episode of dizziness requiring pt to sit down. HR remained around 130 bpm throughout ambulation. Pt showing decreased stance time on L LE with shorter step length on R. Pt leads with toe contact rather than heel strike on L.    Balance   Balance Comments No obvious balance impairment. Pt reports dizziness with her feeling as though she was spinning during ambulation requiring a seated break for recovery.   Sensory Examination   Sensory Perception no deficits were identified   Coordination   Coordination no deficits were identified   Muscle Tone   Muscle Tone no deficits were identified   General Therapy Interventions   Planned Therapy Interventions balance training;bed mobility training;ROM;strengthening;stretching;transfer training;gait training;neuromuscular re-education   Clinical Impression   Criteria for Skilled Therapeutic Intervention yes, treatment indicated   PT Diagnosis Reduced activity tolerance, strength and functional mobility with UTI, previous Hip surgeries, reduced balance   Influenced by the following impairments Strength, mobility exam, balance   Functional limitations due to impairments Gait, stairs, transfers   Clinical Presentation Evolving/Changing   Clinical Presentation Rationale Medical Cormorbidities, level of function pre admission, anxiety   Clinical Decision Making (Complexity) Moderate complexity   Therapy Frequency` daily  "  Predicted Duration of Therapy Intervention (days/wks) 3 days   Anticipated Equipment Needs at Discharge (Defer to TCU)   Anticipated Discharge Disposition Transitional Care Facility   Risk & Benefits of therapy have been explained Yes   Patient, Family & other staff in agreement with plan of care Yes   Clinical Impression Comments Pt presents with L hip weakenss and mild pain. This impairs her gait pattern and balance which makes her unsafe to ambulate safely without assistance. Pt would benefit from skilled therapy to address balance impairment, gait impairments and strength deficits with acute illness on top of some more chronic and orthopedic issues in recent year. Barriers to home DC including living alone currently while  is in TCU   [facility] AM-PAC TM \"6 Clicks\"   2016, Trustees of [facility], under license to [employer].  All rights reserved.   6 Clicks Short Forms Basic Mobility Inpatient Short Form   [facility] AM-PAC  \"6 Clicks\" V.2 Basic Mobility Inpatient Short Form   1. Turning from your back to your side while in a flat bed without using bedrails? 3 - A Little   2. Moving from lying on your back to sitting on the side of a flat bed without using bedrails? 3 - A Little   3. Moving to and from a bed to a chair (including a wheelchair)? 3 - A Little   4. Standing up from a chair using your arms (e.g., wheelchair, or bedside chair)? 3 - A Little   5. To walk in hospital room? 3 - A Little   6. Climbing 3-5 steps with a railing? 2 - A Lot   Basic Mobility Raw Score (Score out of 24.Lower scores equate to lower levels of function) 17   Total Evaluation Time   Total Evaluation Time (Minutes) 20[AN1.1]        Revision History        User Key Date/Time User Provider Type Action    > AN1.1 5/1/2017 12:05 PM Arlet Palumbo, PT Physical Therapist Sign            Progress Notes by Mauricio Hernandez at 5/1/2017 11:19 AM     Author:  Mauricio Hernandez Service:  Spiritual Health Author " Type:      Filed:  5/1/2017 11:23 AM Date of Service:  5/1/2017 11:19 AM Note Created:  5/1/2017 11:19 AM    Status:  Signed :  Mauricio Hernandez ()         SPIRITUAL HEALTH SERVICES  SPIRITUAL ASSESSMENT Progress Note  [facility]      Pt request - (Pt known to  from previous hospitalization.)  Pt was sitting in a chair and reported she was excited that she would be going to the Med/Surg floor today.   provided a prayer and communion to pt.   is available for pt/family needs.    Mauricio Hernandez M.Div., [facility]  Staff   Office tel: [phone][JV1.1]       Revision History        User Key Date/Time User Provider Type Action    > JV1.1 5/1/2017 11:23 AM Mauricio Hernandez Sign            Progress Notes by Arash Silva MD at 4/30/2017  7:55 AM     Author:  Arash Silva MD Service:  Hospitalist Author Type:  Physician    Filed:  4/30/2017  3:03 PM Date of Service:  4/30/2017  7:55 AM Note Created:  4/30/2017  7:55 AM    Status:  Signed :  Arash Silva MD (Physician)         [facility] Progress Note    Date of Service (when I saw the patient):[JK1.1] 04/30/2017     Assessment & Plan[JK1.2]   Kathryn Banks is a 77 year old female who was admitted on 4/28/2017.[JK1.1]  Klebsiella pneumoniae UTI has been identified, and clinical course is consistent with severe sepsis with acute organ dysfunction including initially elevated lactic acid level and acute kidney injury due to UTI.  Signs of sepsis are improving although signs of kidney injury persist.  Rapid atrial fibrillation worsened overnight but is starting to improve after initiation of amiodarone.  She did not tolerate the amiodarone bolus initially because of a drop in blood pressure, but blood pressure has now stabilized and she is tolerating the lower dose amiodarone infusion without severe  hypotension.  She has a persistent metabolic acidosis with normal anion gap probably due to acute kidney injury and possibly exacerbated by hyperchloremia from isotonic IV fluid administration for treatment of sepsis with hypotension.  INR supratherapeutic today, but she is not overtly bleeding.  Although there was initial concern for possible cellulitis in the thighs, this is thought less likely today.  So far, CAD has been stable.  Blood sugars have been well controlled and are actually trending toward hypoglycemia today potentially because of acute kidney injury that could exacerbate the duration and effect of previous oral glipizide therapy.      UTI due to Klebsiella species    Severe sepsis with acute organ dysfunction due to Gram negative bacteria (H)    Lymphedema    Anemia    Type 2 diabetes mellitus with other circulatory complications (H)    Acute kidney injury (H)    Atrial fibrillation with rapid ventricular response (H)    Cellulitis of leg without foot    Supratherapeutic INR    Metabolic acidosis, normal anion gap (NAG)    Restless leg syndrome    Sleep apnea    Chronic kidney disease, stage III (moderate)    CAD - NSTEMI, CABG x 5 2007, angio in 2013 with patent grafts other than occluded graft to PL    Essential hypertension with goal blood pressure less than 140/90    Leukocytosis    Hold glipizide today  Continue IV amiodarone infusion as long as blood pressure remains stable  Discontinue vancomycin now that gram-negative infection has been identified and because of acute kidney injury, continue cephalosporin therapy for UTI  Follow urine output and renal function closely  Add low-dose diuretic due to hyperchloremia and elevated volume status  Follow acid base status  Hold warfarin today, discussed with pharmacy[JK1.3]    Lines present: No invasive lines  DVT Prophylaxis: Warfarin  GI Prophylaxis:[JK1.1] Not indicated[JK1.3]  Procedures planned or completed today:  none  Restraints: Restraints  for medical healing needed: NO    Family update by me today:[JK1.1] No[JK1.3]    Arash Silva[JK1.2] MD[JK1.1]    Time Spent on this Encounter[JK1.2]   Billing:  I spent[JK1.1] 40[JK1.3] minutes bedside and on the inpatient unit today managing the care of Kathryn Banks in relation to the issues listed in this note.[JK1.1]    Interval History[JK1.2]   General:  feels better but[JK1.1] developed worsening rapid atrial fibrillation overnight with transient drop in blood pressure after a bolus of IV amiodarone[JK1.3]    Vitals: no fever, increasing heart rate and falling blood pressure   Intake/Output: tolerating IV fluids, total intake is greater than output and decreased urine output   Nutrition: tolerating an advancing diet   Mental status: less confused and responding appropriately   Respiratory: respiratory effort about the same and oxigenation levels about the same   Cardiovascular no chest pain and increasing heart rate   Renal: stable renal fuction and decreasing urinary output   Neurologic: no focal deficits reported   Medications: amoidarone drip started overnight   Interventions: No interventions performed since the last visit   Consults: No consultations were requested since the last visit[JK1.1]        Medications     amiodarone (CORDARONE) infusion ADULT 1 mg/min (04/30/17 0528)     amiodarone (CORDARONE) infusion ADULT       NaCl 125 mL/hr at 04/30/17 0550     Warfarin Therapy Reminder         cefTRIAXone  1 g Intravenous Q24H     vancomycin (VANCOCIN) IV  1,250 mg Intravenous Q24H     gabapentin  300 mg Oral BID     glipiZIDE  10 mg Oral Daily with breakfast     nitroglycerin  0.4 mg Sublingual See Admin Instructions     pramipexole  1 mg Oral At Bedtime     insulin aspart  1-7 Units Subcutaneous TID AC     insulin aspart  1-5 Units Subcutaneous At Bedtime       Physical Exam   Temp: 98.3  F (36.8  C) Temp src: Oral Temp  Min: 97.8  F (36.6  C)  Max: 98.3  F (36.8  C) BP: 100/61 Pulse: 110 Heart  Rate: 85 Resp: 19 SpO2: 97 % O2 Device: None (Room air) Oxygen Delivery: 1 LPM  Vitals:    04/28/17 1028 04/28/17 1720   Weight: 81.2 kg (179 lb 0.2 oz) 81.5 kg (179 lb 10.8 oz)     I/O last 3 completed shifts:  In: 3070 [P.O.:2020; I.V.:1050]  Out: 400 [Urine:400]    Resp: 19  BP - Mean:  [] 76[JK1.2]     Constitutional:[JK1.1] no acute distress resting in bed[JK1.3]  Neurologic:[JK1.1] alert and maintains wakefulness and attention, seems somewhat confused but is able to follow instructions reliably, normal speech[JK1.3]  Cardiovascular:[JK1.1] tachycardic with irregularly irregular rhythm, good radial pulse with normal capillary refill[JK1.3]  Respiratory:[JK1.1] normal respiratory effort, clear lungs[JK1.3]  GI:[JK1.1] soft abdomen without tenderness[JK1.3]  Skin:[JK1.1] blanching erythema present over the medial aspect of both thighs without warmth or tenderness[JK1.3]  Extremities:[JK1.1] pitting edema present in the lower extremities bilaterally including the thighs[JK1.3]  Lines: No[JK1.1] invasive lines[JK1.3]      Data[JK1.2]   Data reviewed today:  I personally reviewed[JK1.1] telemetry demonstrating atrial fibrillation[JK1.3].[JK1.1]    Recent Labs  Lab 04/30/17  0536 04/29/17  1757 04/29/17  0537 04/28/17  1050   WBC 8.3  --  12.9* 22.4*   HGB 8.7*  --  9.0* 10.7*   MCV 82  --  83 82     --  246 283   INR 4.64*  --  5.13* 3.07*    145* 145* 143   POTASSIUM 4.6 5.6* 4.8 4.5   CHLORIDE 121* 120* 121* 117*   CO2 13* 14* 15* 16*   BUN 45* 45* 40* 35*   CR 1.82* 1.92* 1.77* 1.44*   ANIONGAP 10 11 9 10   MALICK 7.2* 7.5* 7.2* 7.9*   GLC 74 172* 133* 190*   ALBUMIN  --   --   --  2.6*   PROTTOTAL  --   --   --  5.9*   BILITOTAL  --   --   --  0.3   ALKPHOS  --   --   --  242*   ALT  --   --   --  23   AST  --   --   --  13   TROPI  --   --   --  0.026[JK1.2]     Urine culture grew Klebsiella pneumoniae  Blood cultures remain negative at 2 days  Nasal culture for MRSA was negative[JK1.3]        Revision History        User Key Date/Time User Provider Type Action    > JK1.3 4/30/2017  3:03 PM Arash Silva MD Physician Sign     JK1.2 4/30/2017  7:56 AM Arash Silva MD Physician      JK1.1 4/30/2017  7:55 AM Arash Silva MD Physician             Progress Notes by Crystal Clemens RN at 4/30/2017  9:00 AM     Author:  Crystal Clemens RN Service:  (none) Author Type:  Registered Nurse    Filed:  4/30/2017  2:38 PM Date of Service:  4/30/2017  9:00 AM Note Created:  4/30/2017  2:34 PM    Status:  Signed :  Crystal Clemens RN (Registered Nurse)         S-(situation): hypoglycemia    B-(background): sepsis    A-(assessment): Patient BS 61, asymptomatic,     R-(recommendations): patient refusing gel would like grape juice; writer notified provider DC'd glypizide, grape juice given and recheck BS is 95.[PR1.1]     Revision History        User Key Date/Time User Provider Type Action    > PR1.1 4/30/2017  2:38 PM Crystal Clemens RN Registered Nurse Sign            Progress Notes by Crystal Clemens RN at 4/28/2017  6:37 PM     Author:  Crystal Clemens RN Service:  (none) Author Type:  Registered Nurse    Filed:  4/30/2017  2:33 PM Date of Service:  4/28/2017  6:37 PM Note Created:  4/28/2017  6:37 PM    Status:  Addendum :  Crystal Clemens RN (Registered Nurse)         S-(situation): Patient arrives to room 218 via cart from ER    B-(background): afib/flutter with confusion    A-(assessment): 's with /74, 2L 02 @ 95% patient is alert to person, place, not to t[PR1.1]i[PR1.2]me or situation. Legs are red, shiny, and warm to touch.[PR1.1] Patient is very sleepy and falls sleep in midsentence.Skin; Insicion on left hip d/t surgery, multiple bruising on arms[PR1.3], legs kailash, shiny, weeping??, scab on LLE[PR1.4].[PR1.3]      R-(recommendations): Orders reviewed with patiient. Will monitor patient per MD orders.     Inpatient nursing criteria listed below were met:    Health care directives status  obtained and documented: Yes  Core Measures assessed (SSI): Yes  SCD's Documented: Yes  Vaccine assessment done and vaccines ordered if appropriate: Yes  Skin issues/needs documented:Yes  Isolation needs addressed, if appropriate: NA  Fall Prevention: Care plan updated, Education given and documented Yes  MRSA swab completed for patient 55 years and older (exclude SADIA and TKA): Yes  My Chart patient sign up addressed and documented: Yes  Care Plan initiated: Yes  Education Assessment documented:Yes  Education Documented (Pre-existing chronic infection such as, MRSA/VRE need education on admission): Yes  New medication patient education completed and documented (Possible Side Effects of Common Medications handout): Yes  Home medications if not able to send immediately home with family stored here: NA   Reminder note placed in discharge instructions: NA  Discharge planning review completed (admission navigator) Yes[PR1.1]           Revision History        User Key Date/Time User Provider Type Action    > PR1.4 4/30/2017  2:33 PM Crystal Clemens RN Registered Nurse Addend     PR1.2 4/28/2017  7:19 PM Crytsal Clemens RN Registered Nurse Addend     PR1.3 4/28/2017  6:51 PM Crystal Clemens, RN Registered Nurse Addend     PR1.1 4/28/2017  6:41 PM Crystal Clemens, RN Registered Nurse Sign            Progress Notes by Azar Baron RN at 4/29/2017  8:30 PM     Author:  Azar Baron RN Service:  Skilled Nursing Author Type:  Registered Nurse    Filed:  4/30/2017  6:41 AM Date of Service:  4/29/2017  8:30 PM Note Created:  4/30/2017  6:40 AM    Status:  Addendum :  Azar Baron RN (Registered Nurse)         S-(situation): Continuing fast heart rate.     B-(background): A-fib with RVR.     A-(assessment): Patient is presently asymptomatic with good oxygen saturation and blood pressure. Patient does not feel any difficulty in breathing or feel palpitations. Dr. Hartley notified of continued and increasing heart  rate.     R-(recommendations): Continue to monitor and assess.[CH1.1]      Revision History        User Key Date/Time User Provider Type Action    > [N/A] 4/30/2017  6:41 AM Azar Baron RN Registered Nurse Addend     CH1.1 4/30/2017  6:41 AM Azar Baron RN Registered Nurse Sign            Progress Notes by Briana Ball MD at 4/29/2017 10:40 AM     Author:  Briana Ball MD Service:  Family Medicine Author Type:  Physician    Filed:  4/29/2017  3:42 PM Date of Service:  4/29/2017 10:40 AM Note Created:  4/29/2017 10:40 AM    Status:  Signed :  Briana Ball MD (Physician)         [facility] Progress Note          Assessment and Plan:   Assessment:[EP1.1]   Active Problems:    Sepsis (H)    Assessment:[EP1.2] Patient presented with fever, leukocytosis, acute kidney injury and atrial fibrillation with RVR with suspected UTI and known left medial leg cellulitits.  Now having mild hypotension (which may be caused by sepsis vs attempted treatment of atrial fibrillation) and worsening creatinine this morning with low urine output.  Leukocytosis is improving significantly from 22.4 down to 12.9 and lactic acid remains normal.  Blood cultures negative to date, urine culture is already growing out >100,000 gram negative rods.  Patient is starting to clinically improve with increased energy and appetite this morning.[EP1.3]      Plan:[EP1.2] continue with Rocephin and Vancomycin and consider broadening antibiotics further if concern for worsening sepsis arises.  Continue with IV fluids at 125 cc/hr and monitor urinary output closely, continue to monitor blood pressures and hold Imdur and Toprol XL this morning.  Recheck BMP this evening and CBC and BMP tomorrow.[EP1.3]        Acute cystitis without hematuria    Assessment:[EP1.2] UC is growing out gram negative rods[EP1.3]    Plan:[EP1.2] continue with IV Rocephin as above and monitor for improvement and culture  results.[EP1.3]      Cellulitis of leg without foot    Assessment:[EP1.2] Present on the left medial thigh, improving with decreased erythema and warmth compared to time of admission    Alfreda[EP1.3]n:[EP1.2] continue with IV vancomycin as able, monitor blood culture results and treatment as above.[EP1.3]      Atrial fibrillation with rapid ventricular response (H)    Assessment:[EP1.2] Patient continues to be in atrial fibrillation with HR in the 100-110s however morning metoprolol was held secondary to blood pressures in the 80s systolic.  Following fluid bolus, blood pressures have stabilized in the  range systolic and HR still in the 110 range - occasionally increasing to the 120s.      P[EP1.3]jose:[EP1.2] Will continue treatment of underlying infections as above.  Given low normal blood pressures and HR in the 100-110 range will continue to monitor for now.  If HR becomes consistently over 120, would re-evaluate - may be able to try very low dose metoprolol (12.5 mg x1 dosing) vs initiation of amiodarone vs digoxin if blood pressures still concerning.[EP1.3]        Acute kidney injury (H)    Assessment:[EP1.2] patient's creatinine was slightly elevated on admission (1.44 and baseline appears to be 1.2-1.3) however has increased further this morning despite fluid bolusing and ongoing maintenance IV fluids.[EP1.3]      Plan:[EP1.2] Will proceed with 500 cc bolus and continue IV fluids at 125 cc/hr.  Continue to monitor urinary output closely and will start strict I/Os.  Recheck creatinine q12 hours to ensure stability and hopeful improvement.[EP1.3]        Chronic kidney disease, stage III (moderate)    Assessment:[EP1.2] with acute worsening as above[EP1.3]    Plan:[EP1.2] Will proceed with treatment as above[EP1.3]      Paroxysmal atrial fibrillation (H)    Assessment:[EP1.2] with current exacerbation as above[EP1.3]    Plan:[EP1.2] continue with treatment as above[EP1.3]      Lymphedema     Assessment:[EP1.2] chronic and ongoing - may worsen secondary to IV fluids and need for recurrent bolusing[EP1.3]    Plan:[EP1.2] continue to monitor[EP1.3]      Type 2 diabetes mellitus with other circulatory complications (H)    Assessment:[EP1.2] blood sugars slightly elevated last night at time of admission but have improved into the 120-140 range today.  Hemoglobin A1C was 6.2 on 3/31/17 showing good control with home glipizide regimen[EP1.3]    Plan:[EP1.2] Continue with home regimen and cover with SSI as needed[EP1.3]      Essential hypertension with goal blood pressure less than 140/90    Assessment:[EP1.2] blood pressures low as above[EP1.3]    Plan:[EP1.2] will hold home Imdur, lasix and metoprolol and monitor blood pressures closely as above[EP1.3]      Leukocytosis    Assessment:[EP1.2] secondary to infection and sepsis as above.  WBC improving from 22.4 at time of admission down to 12.9 this morning[EP1.3]     Plan:[EP1.2] continue treatment as above and continue to monitor.[EP1.3]       Restless leg syndrome    Assessment:[EP1.2] chronic and stable on Requip[EP1.3]    Plan:[EP1.2] Continue home regimen and monitor[EP1.3]      Sleep apnea    Assessment:[EP1.2] not using CPAP at home[EP1.3]    Plan:[EP1.2] continue to monitor and use O2 supplementation as needed[EP1.3]      CAD - NSTEMI, CABG x 5 2007, angio in 2013 with patent grafts other than occluded graft to PL    Assessment:[EP1.2] no symptoms concerning for angina[EP1.3]    Plan:[EP1.2] continue to monitor.  Hold Imdur and Toprol XL at this time as above[EP1.3]      Long term current use of anticoagulant therapy    Assessment:[EP1.2] on Coumadin with INR 5.13 this morning[EP1.3]    Plan:[EP1.2] pharmacy to assist in monitoring but will hold dose for today.  At this time, with no concerns for active bleeding, will not give Vitamin K[EP1.3]       VTE:[EP1.1]  Coumadin[EP1.2]  Code Status:[EP1.1]  Full Code[EP1.2]        Interval History:[EP1.1]  "  Patient's HR did improved with low dose PO metoprolol given overnight but blood pressures have now been in the 80s systolic with MAPS slightly less than 65[EP1.2] - did respond to 500 cc bolus given this morning and now in the  range systolically[EP1.3].  Patient continues to feel weak.  HR currently in the 90-1[EP1.2]1[EP1.3]0 range.  Patient afebrile overnight.[EP1.2]  WBC improving.[EP1.3]  Eating and voiding well.  Tolerating medications without significant side effects.  No new concerns today.[EP1.2]            Significant Problems:[EP1.1]     Past Medical History:   Diagnosis Date     Atrial fibrillation (H) 1/17/2007     Carpal tunnel syndrome      Coronary atherosclerosis of native coronary artery 2006    5 vessel CAD     OBSTRUCTIVE SLEEP APNEA     using CPAP     Osteoarthrosis, unspecified whether generalized or localized, unspecified site     generalized arthritis, particularly in her hands and feet         Other and combined forms of senile cataract 2000    Bilateral     Other and unspecified hyperlipidemia      RESTLESS LEGS SYNDROME      TENOSYNOV HAND/WRIST NEC 6/30/2006     Type II or unspecified type diabetes mellitus without mention of complication, not stated as uncontrolled 2001    Diabetes mellitus/pt is diet controlled with weight loss     Unspecified essential hypertension[EP1.4]             Physical Exam:   Blood pressure 98/68, pulse 101, temperature 97.9  F (36.6  C), temperature source Oral, resp. rate 16, height 1.651 m (5' 5\"), weight 81.5 kg (179 lb 10.8 oz), SpO2 98 %.[EP1.1]  Constitutional:   awake, alert, cooperative, no apparent distress, and appears stated age     Lungs:   No increased work of breathing, good air exchange, clear to auscultation bilaterally, no crackles or wheezing     Cardiovascular:   Irregularly irregular heart rate currently in the upper 110s     Abdomen:   Bowel sounds present, abdomen soft and non-tender     Musculoskeletal:   Patient has ongoing " chronic lower leg swelling, does have ongoing erythema and warmth in the left medial thigh however is retracting away from lines of demarcation made in the ED.       Neurologic:   Awake, alert, oriented to name, place and time.       Skin:   Patient has ongoing chronic skin changes in bilateral lower legs.  Has ongoing erythema and warmth in the left medial thigh however is retracting from region of demarcation.  No other skin lesions present[EP1.3]             Data:[EP1.1]   All laboratory data reviewed[EP1.3]    Attestation:  I have reviewed today's vital signs, notes, medications, labs and imaging.     Electronically Signed:  Briana Ball MD    Note: Chart documentation done in part with Dragon Voice Recognition software. Although reviewed after completion, some word and grammatical errors may remain.[EP1.1]        Revision History        User Key Date/Time User Provider Type Action    > EP1.4 4/29/2017  3:42 PM Briana Ball MD Physician Sign     EP1.3 4/29/2017  3:05 PM Briana Ball MD Physician      EP1.2 4/29/2017 10:42 AM Briana Ball MD Physician      EP1.1 4/29/2017 10:40 AM Briana Ball MD Physician                   Procedure Notes     No notes of this type exist for this encounter.         Progress Notes - Therapies (Notes from 04/29/17 through 05/02/17)      Progress Notes by Arlet Palumbo PT at 5/1/2017 12:05 PM     Author:  Arlet Palumbo PT Service:  (none) Author Type:  Physical Therapist    Filed:  5/1/2017 12:05 PM Date of Service:  5/1/2017 12:05 PM Note Created:  5/1/2017 12:05 PM    Status:  Signed :  Arlet Palumbo PT (Physical Therapist)          05/01/17 1000   Quick Adds   Type of Visit Initial PT Evaluation   Living Environment   Lives With alone   Living Arrangements house   Home Accessibility tub/shower is not walk in   Number of Stairs to Enter Home 0   Number of Stairs Within Home 0   Transportation Available family  or friend will provide   Living Environment Comment Patients  has Parkinsons disease and is now at [facility] since [facility] week, for Short term rehab following a hospital stay.   Self-Care   Usual Activity Tolerance fair   Current Activity Tolerance fair   Regular Exercise yes   Activity/Exercise Type strength training;walking   Equipment Currently Used at Home raised toilet;walker, rolling;wheelchair;shower chair   Activity/Exercise/Self-Care Comment Home PT three times per week, does HEP every day otherwise supine exercise program   Functional Level Prior   Ambulation 1-->assistive equipment   Transferring 1-->assistive equipment   Toileting 1-->assistive equipment   Bathing 0-->independent   Dressing 0-->independent   Eating 0-->independent   Communication 0-->understands/communicates without difficulty   Swallowing 0-->swallows foods/liquids without difficulty   Cognition 0 - no cognition issues reported   Fall history within last six months yes   Number of times patient has fallen within last six months 1   Which of the above functional risks had a recent onset or change? fall history   Prior Functional Level Comment Weaker than her normal self per report   General Information   Onset of Illness/Injury or Date of Surgery - Date 04/28/17   Referring Physician Dr Silva   Patient/Family Goals Statement improve activity tolerance   Pertinent History of Current Problem (include personal factors and/or comorbidities that impact the POC) Kathryn Banks is a 77 year old female who was admitted on 4/28/2017. Klebsiella pneumoniae UTI has been identified,with sepsis and organ dysfunction this admission noted. Also history of falls with L hip fracture and ORIF, having some difficulty healing this overall and was seeking towards possible SADIA vs hemiarthroplasty. She also has type 2 diabetes, kidney disease, + cardiac history, sleep apnea and hypertension. She was found on her toilet by  EMS after her home PT could not enter her home, and she didnt answer the door. She did note home PT was coming after her hip surgery from February, in which she had her previous hardware exchanged from her previous ORIF.    Precautions/Limitations fall precautions   Weight-Bearing Status - LUE weight-bearing as tolerated   Weight-Bearing Status - RUE weight-bearing as tolerated   Weight-Bearing Status - LLE weight-bearing as tolerated   Weight-Bearing Status - RLE weight-bearing as tolerated   General Observations  at rest in chair on telemetry.    General Info Comments Was driving, walking in the home with walker (2WW)   Cognitive Status Examination   Orientation orientation to person, place and time   Level of Consciousness alert   Follows Commands and Answers Questions able to follow multistep instructions;able to follow single-step instructions;100% of the time   Cognitive Comment States she is a bit foggy minded with this infection.    Pain Assessment   Patient Currently in Pain No   Integumentary/Edema   Integumentary/Edema Comments Edema present in reagan LE   Posture    Posture Comments Min thoracic kyphosis.    Range of Motion (ROM)   ROM Comment Reduced L hip ROM s/p L hip surgery in February and last fall. B UE ROM WFL. R LE WFL. B ankle DF tight   Strength   Strength Comments LE MMT: 4/5 R hip flexion, 3-/5 L hip s/p surgery, R knee ext and knee flexion 5/5, 3+/5 L quad and knee pain, 4/5 HS. Ankle DF L 5/5, R 5/5.  Iso adduction R 4+/5, 4/5 L, abduction 4+/5 R, 4/5 L   Bed Mobility   Bed Mobility Comments Not assessed today but pt reports no major impairments, SBA with nursing   Transfer Skills   Transfer Comments Sit to stand and stand to sit with CGA. No verbal cues required for hand placement. Pt kicks out L LE independently to protect hip.    Gait   Gait Comments Pt ambulates with FWW and CGA. Ambulates 50 ft with one episode of dizziness requiring pt to sit down. HR remained around 130 bpm  throughout ambulation. Pt showing decreased stance time on L LE with shorter step length on R. Pt leads with toe contact rather than heel strike on L.    Balance   Balance Comments No obvious balance impairment. Pt reports dizziness with her feeling as though she was spinning during ambulation requiring a seated break for recovery.   Sensory Examination   Sensory Perception no deficits were identified   Coordination   Coordination no deficits were identified   Muscle Tone   Muscle Tone no deficits were identified   General Therapy Interventions   Planned Therapy Interventions balance training;bed mobility training;ROM;strengthening;stretching;transfer training;gait training;neuromuscular re-education   Clinical Impression   Criteria for Skilled Therapeutic Intervention yes, treatment indicated   PT Diagnosis Reduced activity tolerance, strength and functional mobility with UTI, previous Hip surgeries, reduced balance   Influenced by the following impairments Strength, mobility exam, balance   Functional limitations due to impairments Gait, stairs, transfers   Clinical Presentation Evolving/Changing   Clinical Presentation Rationale Medical Cormorbidities, level of function pre admission, anxiety   Clinical Decision Making (Complexity) Moderate complexity   Therapy Frequency` daily   Predicted Duration of Therapy Intervention (days/wks) 3 days   Anticipated Equipment Needs at Discharge (Defer to TCU)   Anticipated Discharge Disposition Transitional Care Facility   Risk & Benefits of therapy have been explained Yes   Patient, Family & other staff in agreement with plan of care Yes   Clinical Impression Comments Pt presents with L hip weakenss and mild pain. This impairs her gait pattern and balance which makes her unsafe to ambulate safely without assistance. Pt would benefit from skilled therapy to address balance impairment, gait impairments and strength deficits with acute illness on top of some more chronic and  "orthopedic issues in recent year. Barriers to home DC including living alone currently while  is in TCU   [employer] [facility] [employer] TM \"6 Clicks\"   2016, Trustees of [employer] [facility] [employer], under license to [employer].  All rights reserved.   6 Clicks Short Forms Basic Mobility Inpatient Short Form   [employer] [facility]  \"6 Clicks\" V.2 Basic Mobility Inpatient Short Form   1. Turning from your back to your side while in a flat bed without using bedrails? 3 - A Little   2. Moving from lying on your back to sitting on the side of a flat bed without using bedrails? 3 - A Little   3. Moving to and from a bed to a chair (including a wheelchair)? 3 - A Little   4. Standing up from a chair using your arms (e.g., wheelchair, or bedside chair)? 3 - A Little   5. To walk in hospital room? 3 - A Little   6. Climbing 3-5 steps with a railing? 2 - A Lot   Basic Mobility Raw Score (Score out of 24.Lower scores equate to lower levels of function) 17   Total Evaluation Time   Total Evaluation Time (Minutes) 20[AN1.1]        Revision History        User Key Date/Time User Provider Type Action    > AN1.1 5/1/2017 12:05 PM Arlet Palumbo PT Physical Therapist Sign            "

## 2017-04-28 NOTE — IP AVS SNAPSHOT
` `     [address] SURGICAL: [phone]            Medication Administration Report for Kathryn Banks as of 05/02/17 1039   Legend:    Given Hold Not Given Due Canceled Entry Other Actions    Time Time (Time) Time  Time-Action       Inactive    Active    Linked        Medications 04/26/17 04/27/17 04/28/17 04/29/17 04/30/17 05/01/17 05/02/17    acetaminophen (TYLENOL) tablet 650 mg  Dose: 650 mg Freq: EVERY 4 HOURS PRN Route: PO  PRN Reason: mild pain  Start: 04/28/17 1719   Admin Instructions: Alternate ibuprofen (if ordered) with acetaminophen.  Maximum acetaminophen dose from all sources = 75 mg/kg/day not to exceed 4 grams/day.       2336 (650 mg)-Given         0411 (650 mg)-Given             amiodarone (PACERONE/CODARONE) tablet 200 mg  Dose: 200 mg Freq: 2 TIMES DAILY Route: PO  Start: 04/30/17 2345   Admin Instructions: Avoid grapefruit juice during oral amiodarone treatment.          0021 (200 mg)-Given       0844 (200 mg)-Given       2216 (200 mg)-Given        0826 (200 mg)-Given       [ ] 2100           cefdinir (OMNICEF) capsule 300 mg  Dose: 300 mg Freq: DAILY Route: PO  Indications of Use: SEPSIS,URINARY TRACT INFECTION  Start: 05/01/17 0900         0844 (300 mg)-Given        0826 (300 mg)-Given           ferrous sulfate (IRON) tablet 325 mg  Dose: 325 mg Freq: DAILY WITH BREAKFAST Route: PO  Start: 05/02/17 0800   Admin Instructions: Absorbed best on an empty stomach. If stomach upset occurs, can take with meals.           0825 (325 mg)-Given           furosemide (LASIX) tablet 40 mg  Dose: 40 mg Freq: DAILY Route: PO  Start: 05/01/17 1200         1229 (40 mg)-Given        0826 (40 mg)-Given           gabapentin (NEURONTIN) capsule 300 mg  Dose: 300 mg Freq: AT BEDTIME Route: PO  Start: 05/01/17 2200         2216 (300 mg)-Given        [ ] 2200           glucose 40 % gel 15-30 g  Dose: 15-30 g Freq: EVERY 15 MIN PRN Route: PO  PRN Reason: low blood sugar  Start: 04/28/17 1914    Admin Instructions: Give 15 g for BG 51 to 69 mg/dL IF patient is conscious and able to swallow. Give 30 g for BG less than or equal to 50 mg/dL IF patient is conscious and able to swallow. Do NOT give glucose gel via enteral tube.  IF patient has enteral tube: give apple juice 120 mL (4 oz or 15 g of CHO) via enteral tube for BG 51 to 69 mg/dL.  Give apple juice 240 mL (8 oz or 30 g of CHO) via enteral tube for BG less than or equal to 50 mg/dL.         (0818)-Not Given [C]            Or  dextrose 50 % injection 25-50 mL  Dose: 25-50 mL Freq: EVERY 15 MIN PRN Route: IV  PRN Reason: low blood sugar  Start: 04/28/17 1914   Admin Instructions: Use if have IV access, BG less than 70 mg/dL and meet dose criteria below:  Dose if conscious and alert (or disorientated) and NPO = 25 mL  Dose if unconscious / not alert = 50 mL  Vesicant.                     Or  glucagon injection 1 mg  Dose: 1 mg Freq: EVERY 15 MIN PRN Route: SC  PRN Reason: low blood sugar  PRN Comment: May repeat x 1 only  Start: 04/28/17 1914   Admin Instructions: May give SQ or IM. IF BG less than or equal to 50 mg/dL and no IV access.  ONLY use glucagon IF patient has NO IV access AND is UNABLE to swallow.                      HOLD MEDICATION  Freq: HOLD Route: XX  Start: 04/30/17 0758              HYDROcodone-acetaminophen (NORCO) 5-325 MG per tablet 1-2 tablet  Dose: 1-2 tablet Freq: EVERY 6 HOURS PRN Route: PO  PRN Reason: moderate to severe pain  Start: 04/30/17 0440   Admin Instructions: Maximum acetaminophen dose from all sources= 75 mg/kg/day not to exceed 4 grams               insulin aspart (NovoLOG) inj (RAPID ACTING)  Dose: 1-5 Units Freq: AT BEDTIME Route: SC  Start: 04/28/17 2200   Admin Instructions: MEDIUM INSULIN RESISTANCE DOSING    Do Not give Bedtime Correction Insulin if BG less than  200.   For  - 249 give 1 units.   For  - 299 give 2 units.   For  - 349 give 3 units.   For  -399 give 4 units.   For BG  greater than or equal to 400 give 5 units.  Notify provider if glucose greater than or equal to 350 mg/dL after administration of correction dose.  If given at mealtime, must be administered 5 min before meal or immediately after.       (2101)-Not Given        (2220)-Not Given [C]        2129 (1 Units)-Given        (2215)-Not Given        [ ] 2200           insulin aspart (NovoLOG) inj (RAPID ACTING)  Dose: 1-7 Units Freq: 3 TIMES DAILY BEFORE MEALS Route: SC  Start: 04/29/17 0800   Admin Instructions: Correction Scale - MEDIUM INSULIN RESISTANCE DOSING     Do Not give Correction Insulin if Pre-Meal BG less than 140.   For Pre-Meal  - 189 give 1 unit.   For Pre-Meal  - 239 give 2 units.   For Pre-Meal  - 289 give 3 units.   For Pre-Meal  - 339 give 4 units.   For Pre-Meal - 399 give 5 units.   For Pre-Meal -449 give 6 units  For Pre-Meal BG greater than or equal to 450 give 7 units.   To be given with prandial insulin, and based on pre-meal blood glucose.    Notify provider if glucose greater than or equal to 350 mg/dL after administration of correction dose.  If given at mealtime, must be administered 5 min before meal or immediately after.        (0926)-Not Given       1156 (1 Units)-Given       (1657)-Not Given        (0817)-Not Given       (1200)-Not Given       (1710)-Not Given        (0847)-Not Given       (1223)-Not Given       1741 (2 Units)-Given        0827 (1 Units)-Given       [ ] 1200       [ ] 1700           isosorbide mononitrate (IMDUR) 24 hr tablet 30 mg  Dose: 30 mg Freq: DAILY Route: PO  Start: 05/01/17 1200   Admin Instructions: DO NOT CRUSH. Can split tablet in half along score radha.          1229 (30 mg)-Given        0826 (30 mg)-Given           magnesium sulfate 2 g in water intermittent infusion  Dose: 2 g Freq: DAILY PRN Route: IV  PRN Reason: magnesium supplementation  Start: 04/29/17 1719   Admin Instructions: For Serum Mg++ 1.6 - 2 mg/dL  Give 2 g and  recheck magnesium level next AM.               magnesium sulfate 4 g in 100 mL sterile water (premade)  Dose: 4 g Freq: EVERY 4 HOURS PRN Route: IV  PRN Reason: magnesium supplementation  Start: 04/29/17 1719   Admin Instructions: For serum Mg++ less than 1.6 mg/dL  Give 4 g and recheck magnesium level 2 hours after dose, and next AM.               metoprolol (LOPRESSOR) tablet 50 mg  Dose: 50 mg Freq: 2 TIMES DAILY Route: PO  Start: 05/02/17 0900          0826 (50 mg)-Given       [ ] 2100           naloxone (NARCAN) injection 0.1-0.4 mg  Dose: 0.1-0.4 mg Freq: EVERY 2 MIN PRN Route: IV  PRN Reason: opioid reversal  Start: 04/28/17 1719   Admin Instructions: For respiratory rate LESS than or EQUAL to 8.  Partial reversal dose:  0.1 mg titrated q 2 minutes for Analgesia Side Effects Monitoring Sedation Level of 3 (frequently drowsy, arousable, drifts to sleep during conversation).Full reversal dose:  0.4 mg bolus for Analgesia Side Effects Monitoring Sedation Level of 4 (somnolent, minimal or no response to stimulation).               nitroglycerin (NITROSTAT) sublingual tablet 0.4 mg  Dose: 0.4 mg Freq: SEE ADMIN INSTRUCTIONS Route: SL  Start: 04/28/17 1913              ondansetron (ZOFRAN-ODT) ODT tab 4 mg  Dose: 4 mg Freq: EVERY 6 HOURS PRN Route: PO  PRN Reason: nausea  Start: 04/28/17 1719   Admin Instructions: This is Step 1 of nausea and vomiting management.  If nausea not resolved in 15 minutes, go to Step 2 prochlorperazine (COMPAZINE). Do not push through foil backing. Peel back foil and gently remove. Place on tongue immediately. Administration with liquid unnecessary              Or  ondansetron (ZOFRAN) injection 4 mg  Dose: 4 mg Freq: EVERY 6 HOURS PRN Route: IV  PRN Reasons: nausea,vomiting  Start: 04/28/17 1719   Admin Instructions: This is Step 1 of nausea and vomiting management.  If nausea not resolved in 15 minutes, go to Step 2 prochlorperazine (COMPAZINE).  Irritant.               pramipexole  (MIRAPEX) tablet 1 mg  Dose: 1 mg Freq: AT BEDTIME Route: PO  Start: 04/28/17 2200      2058 (1 mg)-Given               2120 (1 mg)-Given        2131 (1 mg)-Given        2216 (1 mg)-Given        [ ] 2200           simvastatin (ZOCOR) tablet 20 mg  Dose: 20 mg Freq: AT BEDTIME Route: PO  Start: 05/01/17 2200         2216 (20 mg)-Given        [ ] 2200           sodium bicarbonate tablet 1,300 mg  Dose: 1,300 mg Freq: 4 TIMES DAILY Route: PO  Start: 05/01/17 0800         0844 (1,300 mg)-Given       1229 (1,300 mg)-Given       1608 (1,300 mg)-Given       2218 (1,300 mg)-Given        0826 (1,300 mg)-Given       [ ] 1200       [ ] 1600       [ ] 2000           Warfarin Therapy Reminder (Check START DATE - warfarin may be starting in the FUTURE)  Freq: CONTINUOUS PRN Route: XX  Start: 04/28/17 1913   Admin Instructions: *Note to reorder warfarin daily*  Pharmacy Warfarin Dosing Service  Patient is on Warfarin Therapy - check for daily order                 Dose: 1 each Freq: NO DOSE TODAY (WARFARIN) Route: XX  Start: 05/01/17 1142   End: 05/02/17 0041   Admin Instructions: No dose of Warfarin due today per order           0041-Med Discontinued      Completed Medications  Medications 04/26/17 04/27/17 04/28/17 04/29/17 04/30/17 05/01/17 05/02/17         Dose: 150 mg Freq: ONCE Route: IV  Start: 04/30/17 0445   End: 04/30/17 0508   Admin Instructions: Give over 10 minutes then initiate amiodarone infusion.  If patient becomes hypotensive (SBP less than 90), give bolus over 20-30 minutes.  FOR Telemetry patients ONLY  Administer through 0.22 micron filter         0458 (150 mg)-Given               Dose: 40 mg Freq: ONCE Route: IV  Start: 04/30/17 0815   End: 04/30/17 0921        0921 (40 mg)-Given               Dose: 12.5 mg Freq: ONCE Route: PO  Start: 04/29/17 1315   End: 04/29/17 1415       1415 (12.5 mg)-Given             Discontinued Medications  Medications 04/26/17 04/27/17 04/28/17 04/29/17 04/30/17 05/01/17 05/02/17          Rate: 125 mL/hr Freq: CONTINUOUS Route: IV  Last Dose: Stopped (04/30/17 0816)  Start: 04/29/17 1045   End: 04/30/17 0759       1119 ( )-New Bag       1655-Stopped       1801 ( )-Restarted       2116 ( )-New Bag        0550 ( )-New Bag       0759-Med Discontinued  0816-Stopped               Rate: 125 mL/hr Freq: CONTINUOUS Route: IV  Last Dose: 1,000 mL (04/29/17 0506)  Start: 04/28/17 1109   End: 04/29/17 1039   Admin Instructions: Administer after the bolus.       1430 (1,000 mL)-New Bag       1500 (1,000 mL)-New Bag       2107 (1,000 mL)-New Bag        0506 (1,000 mL)-New Bag       1039-Med Discontinued            Rate: 30 mL/hr Freq: CONTINUOUS Route: IV  Last Dose: Stopped (05/01/17 0525)  Start: 04/30/17 1045   End: 05/01/17 0739   Admin Instructions: After 24 hours of infusion, call MD for further orders (if MD not already contacted).  Hold for systolic BP less than 90 or HR less than 60.  FOR Telemetry patients ONLY.    Irritant. Administer through 0.22 micron filter         0953 (1 mg/min)-New Bag [C]       1058 (0.5 mg/min)-Rate/Dose Change       1731 (0.5 mg/min)-New Bag        0146 (0.5 mg/min)-New Bag       0525-Stopped [C]       0739-Med Discontinued          Rate: 60 mL/hr Freq: CONTINUOUS Route: IV  Last Dose: 1 mg/min (04/30/17 0528)  Start: 04/30/17 0445   End: 04/30/17 1044   Admin Instructions: After 24 hours of infusion, call MD for further orders (if MD not already contacted).  Hold for systolic BP less than 90 or HR less than 60.  FOR Telemetry patients ONLY.  Run for 6 hours.  Irritant. Administer through 0.22 micron filter         0528 (1 mg/min)-New Bag       1044-Med Discontinued           Dose: 1 g Freq: EVERY 24 HOURS Route: IV  Indications of Use: URINARY TRACT INFECTION  Start: 04/29/17 0800   End: 04/30/17 1504   Admin Instructions: 200 mL/hr for 30 minutes        0924 (1 g)-New Bag [C]        0736 (1 g)-New Bag       1504-Med Discontinued           Dose: 300 mg Freq: 2 TIMES  DAILY Route: PO  Start: 04/28/17 2100   End: 05/01/17 1212      2058 (300 mg)-Given        0932 (300 mg)-Given       2118 (300 mg)-Given        0921 (300 mg)-Given       2131 (300 mg)-Given        0843 (300 mg)-Given       1212-Med Discontinued          Dose: 10 mg Freq: DAILY WITH BREAKFAST Route: PO  Start: 04/29/17 0800   End: 04/30/17 0807   Admin Instructions: DO NOT CRUSH. Take before or with meals.        0926 (10 mg)-Given [C]        (0736)-Not Given [C]       0807-Med Discontinued           Dose: 1 tablet Freq: EVERY 6 HOURS PRN Route: PO  PRN Reason: moderate to severe pain  Start: 04/28/17 1914   End: 04/30/17 0440   Admin Instructions: Maximum acetaminophen dose from all sources= 75 mg/kg/day not to exceed 4 grams       2150 (1 tablet)-Given        0651 (1 tablet)-Given        0003 (1 tablet)-Given       0440-Med Discontinued           Dose: 90 mg Freq: DAILY Route: PO  Start: 04/29/17 0900   End: 04/29/17 1040   Admin Instructions: DO NOT CRUSH. Can split tablet in half along score radha.        0927-Hold [C]       1040-Med Discontinued            Dose: 150 mg Freq: DAILY Route: PO  Start: 04/29/17 0900   End: 04/29/17 1040   Admin Instructions: DO NOT CRUSH. Tablet may be split in half along score line.        0927-Hold [C]       1040-Med Discontinued            Dose: 25 mg Freq: DAILY Route: PO  Start: 05/01/17 1200   End: 05/02/17 0614   Admin Instructions: DO NOT CRUSH. Tablet may be split in half along score line.          1229 (25 mg)-Given        0614-Med Discontinued         Dose: 40 mg Freq: AT BEDTIME Route: PO  Start: 05/01/17 2200   End: 05/01/17 1955         1955-Med Discontinued          Dose: 650 mg Freq: 4 TIMES DAILY Route: PO  Start: 05/01/17 0800   End: 05/01/17 0746         0746-Med Discontinued          Dose: 1,250 mg Freq: EVERY 24 HOURS Route: IV  Indications of Use: SKIN AND SOFT TISSUE INFECTION  Start: 04/28/17 1655   End: 04/30/17 0801   Admin Instructions: For an adult with  peripheral line and dose of 2-2.5 g, infuse over 2 hours.  IF central catheter, doses below 2 g may be given over 1 hour.       1742 (1,250 mg)-New Bag        1654 (1,250 mg)-New Bag        0801-Med Discontinued           Dose: 1 each Freq: NO DOSE TODAY (WARFARIN) Route: XX  Start: 04/30/17 0810   End: 05/01/17 1142   Admin Instructions: No dose of Warfarin due today per order  (INR4.64)          1142-Med Discontinued          Dose: 1 each Freq: NO DOSE TODAY (WARFARIN) Route: XX  Start: 04/29/17 0810   End: 04/30/17 0009   Admin Instructions: No dose of Warfarin due today for INR 5.13         0009-Med Discontinued      Medications 04/26/17 04/27/17 04/28/17 04/29/17 04/30/17 05/01/17 05/02/17

## 2017-04-28 NOTE — ED NOTES
Bed: ED05  Expected date: 4/28/17  Expected time: 10:23 AM  Means of arrival: Ambulance  Comments:

## 2017-04-28 NOTE — IP AVS SNAPSHOT
` `       Patient Information     Patient Name Sex     Kathryn Banks ([id number]) Female 1940       Room Bed    272 272-01      Patient Demographics     Address Phone E-mail Address    [address] [place] [address] [phone] (Home) [email or web address] [email or web address]      Patient Ethnicity & Race     Ethnic Group Patient Race    American White      Emergency Contact(s)     Name Relation Home Work Mobile    Urbano Banks Spouse [phone]      yoshi roth Daughter [phone]    Varsha Jade Relative [phone]      Jason Banks Son         Documents on File        Status Date Received Description       Documents for the Patient    Privacy Notice - [place] Received 08     Face Sheet  () 08     Insurance Card  () 04     Face Sheet  () 07     Insurance Card  () 03/15/06     Insurance Card  () 06     Insurance Card  () 07     Waiver - Payment  07            Insurance Card  () 07     Insurance Card Received () 09     External Medication Information Consent Accepted () 09     Insurance Card Received () 04/14/10 MEDICARE    Face Sheet Received () 04/14/10     Patient ID Received 14     Consent for Services - Hospital/Clinic Received () 11/03/10     Insurance Card Received () 10/31/10     Insurance Card  ()      Consent for Services - Hospital/Clinic Received () 11 CONSENT FOR SERVICES    External Medication Information Consent Accepted () 11     CMS IM for Patient Signature       Physical Therapy Certification Received () 12     External Medication Information Consent Accepted () 12     Insurance Card Received () 12     Consent for Services - Hospital/Clinic Received () 12     Consent to Communicate   PHI - 12    HIM EARLENE Authorization  12  NDBC 12    Physical Therapy Certification Received 13     Advance Directives and Living Will Received 13 VALIDATION OF AD 7/3/08    Advance Directives and Living Will Received 13 HEALTHCARE DIRECTIVE 7/3/08    Consent for EHR Access  13 Copied from existing Consent for services - C/HOD collected on 2012    [facility] Specified Other       Insurance Card Received 13 medicare    External Medication Information Consent Accepted 13     Consent for EHR Access Received 13     Consent for Services - Hospital/Clinic Received () 13     HIM EARLENE Authorization  14 Patient    HIM EARLENE Authorization  14 PATIENT    Advance Directives and Living Will Received 14 Health Care Directive  14    Advance Directives and Living Will Received 14 Validation of AD 14    Insurance Card Received () 14 hp freedom plan    Physical Therapy Re-Certification Received 14 to 11/3/14    Consent for Services - Hospital/Clinic Received () 14     Insurance Card Received 03/23/15 HP/rx    HIM EARLENE Authorization  04/03/15 Kathryn Banks    Consent for Services/Privacy Notice - Hospital/Clinic Received () 16     Insurance Card Received 16     Business/Insurance/Care Coordination/Health Form - Patient  16 ADULT REHABILITATION ATTENDANCE POLICY    Business/Insurance/Care Coordination/Health Form - Patient  16 CONSUMER PRICELINE    HIM EARLENE Authorization  10/14/16 Kathryn Banks    Business/Insurance/Care Coordination/Health Form - Patient  17 ADULT REHABILITATION ATTENDANCE POLICY    Insurance Card Received 17 BCBS    HIM EARLENE Authorization  17     Consent for Services/Privacy Notice - Hospital/Clinic Received 17     Face Sheet Received (Deleted) 09     Advance Directives and Living Will Received (Deleted) 08     Advance Directives and Living Will Received  (Deleted) 02/01/13 VALIDATION OF ADVANCE DIRECTIVE 7-3-2008    Insurance Card Received (Deleted) 09/11/14        Documents for the Encounter    CMS IM for Patient Signature Received 04/30/17     ECG   ECG Report    ECG   ECG Report      Admission Information     Attending Provider Admitting Provider Admission Type Admission Date/Time    Arash Silva MD Peterson, Elizabeth Joanne, MD Emergency 04/28/17  1020    Discharge Date Hospital Service Auth/Cert Status Service Area     Internal Medicine Incomplete [facility]    Unit Room/Bed Admission Status       PH 2A MEDICAL SURGICAL 272/272-01 Admission (Confirmed)       Admission     Complaint    Atrial fibrillation with rapid ventricular response (H)      Hospital Account     Name Acct ID Class Status Primary Coverage    [place] W[place] [id number] Inpatient Open MEDICARE - MEDICARE FOR HB SUPPLEMENT            Guarantor Account (for Hospital Account #[id number])     Name Relation to Pt Service Area Active? Acct Type    Kathryn Banks  FCS Yes Personal/Family    Address Phone          [address] [place] [address] [phone](H)              Coverage Information (for Hospital Account #[id number])     1. MEDICARE/MEDICARE FOR HB SUPPLEMENT     F/O Payor/Plan Precert #    MEDICARE/MEDICARE FOR HB SUPPLEMENT     Subscriber Subscriber #    Kathryn Banks [id number]    Address Phone    [employer]  [address]  [place] [address] [phone]          2. BCBS/BCBS PLATINUM BLUE     F/O Payor/Plan Precert #    BCBS/BCBS PLATINUM BLUE     Subscriber Subscriber #    Kathryn Banks [id number]    Address Phone    [address] [address]  [place] [address] [phone]

## 2017-04-28 NOTE — IP AVS SNAPSHOT
"[address] MEDICAL SURGICAL: [phone]                                              INTERAGENCY TRANSFER FORM - PHYSICIAN ORDERS   2017                    Hospital Admission Date: 2017  MARI HERNANDEZ   : 1940  Sex: Female        Attending Provider: Arash Silva MD     Allergies:  Ciprofloxacin, Oxycodone, Lisinopril, Penicillins, Sulfa Drugs    Infection:  None   Service:  INTERNAL MED    Ht:  1.651 m (5' 5\")   Wt:  85.9 kg (189 lb 6 oz)   Admission Wt:  81.2 kg (179 lb 0.2 oz)    BMI:  31.51 kg/m 2   BSA:  1.98 m 2            Patient PCP Information     Provider PCP Type    Dheeraj Jj MD General      ED Clinical Impression     Diagnosis Description Comment Added By Time Added    Urinary tract infection without hematuria, site unspecified [N39.0] Urinary tract infection without hematuria, site unspecified [N39.0]  Jose Bee MD 2017  2:40 PM    Acute pyelonephritis [N10] Acute pyelonephritis [N10]  Jose Bee MD 2017  2:40 PM    Atrial fibrillation with rapid ventricular response (H) [I48.91] Atrial fibrillation with rapid ventricular response (H) [I48.91]  Jose Bee MD 2017  2:40 PM    Atrial flutter, unspecified type (H) [I48.92] Atrial flutter, unspecified type (H) [I48.92]  Jose Bee MD 2017  2:41 PM      Hospital Problems as of 2017              Priority Class Noted POA    Sleep apnea   Unknown Yes    Restless leg syndrome Low  Unknown Yes    Chronic kidney disease, stage III (moderate) Medium  10/17/2007 Yes    CAD - NSTEMI, CABG x 5 , angio in  with patent grafts other than occluded graft to PL   2013 Yes    Lymphedema Medium  11/10/2014 Yes    Anemia Medium  11/10/2014 Yes    Type 2 diabetes mellitus with other circulatory complications (H) Medium  2016 Yes    Acute kidney injury (H) Medium  2016 Yes    Essential hypertension with goal blood pressure less than 140/90 Medium  2016 Yes    " Atrial fibrillation with rapid ventricular response (H) Medium  4/28/2017 Yes    Severe sepsis with acute organ dysfunction due to Gram negative bacteria (H) Medium  4/28/2017 Yes    * (Principal)UTI due to Klebsiella species Medium  4/28/2017 Yes    Rash - redness medial thighs Medium  4/29/2017 Yes    Supratherapeutic INR Medium  4/30/2017 No    Metabolic acidosis, normal anion gap (NAG) Medium  4/30/2017 Yes    Acute encephalopathy Medium  5/1/2017 Yes      Non-Hospital Problems as of 5/2/2017              Priority Class Noted    Lipoma of other skin and subcutaneous tissue   9/7/2004    ESOPHAGEAL REFLUX   3/28/2006    Postsurgical aortocoronary bypass status Low  1/27/2007    History of peptic ulcer disease Low  10/17/2007    Edema Medium  1/31/2008    Obesity   1/31/2008    Kidney stone   12/29/2009    Hyperlipidemia LDL goal <100 Medium  10/31/2010    Advanced directives, counseling/discussion Low  11/12/2012    ACS (acute coronary syndrome) (H)   2/1/2013    Hx of non-ST elevation myocardial infarction (NSTEMI)   2/4/2013    Paroxysmal atrial fibrillation (H) Medium  1/17/2014    Health Care Home   1/20/2014    Jaw pain   2/22/2014    Hypokalemia Medium  7/15/2014    Long term current use of anticoagulant therapy   9/16/2014    Cellulitis and abscess of leg Medium  11/10/2014    Hyperkalemia Medium  11/10/2014    Renal failure   3/23/2015    Osteoarthritis of hip Medium  4/29/2015    Non morbid obesity, unspecified obesity type Medium  11/1/2015    Long-term (current) use of anticoagulants [Z79.01] Medium  3/4/2016    Pulmonary infiltrate Medium  7/2/2016    Fracture of hip, left, closed, initial encounter (H) Medium  7/2/2016    SIRS (systemic inflammatory response syndrome) (H) Medium  7/2/2016    Acute respiratory failure with hypoxia (H) Medium  7/5/2016    Trochanteric bursitis of left hip Medium  10/11/2016      Code Status History     Date Active Date Inactive Code Status Order ID Comments User  Context    5/2/2017 10:00 AM  Full Code [id number]  Jian Hartley MD Outpatient    4/28/2017  7:16 PM 5/2/2017 10:00 AM Full Code [id number]  Jian Hartley MD Inpatient    7/6/2016  1:46 PM 4/28/2017  7:16 PM Full Code [id number]  Isadora Mosley PA-C Outpatient    7/6/2016 10:41 AM 7/6/2016  1:46 PM Full Code [id number]  Arash Silva MD Outpatient    7/3/2016 11:12 AM 7/6/2016 10:41 AM Full Code [id number]  Isadora Mosley PA-C Inpatient    7/2/2016  5:08 AM 7/3/2016 11:12 AM Full Code [id number]  Mike Wong MD Inpatient    3/23/2015  7:40 PM 3/25/2015  1:40 PM Full Code [id number]  Luisa Adan MD Inpatient    12/28/2014  9:42 AM 3/23/2015  7:40 PM Full Code [id number]  Jian Hartley MD Outpatient    12/27/2014  4:26 PM 12/28/2014  9:42 AM Full Code [id number]  Mick Dale MD ED    12/9/2014  2:02 PM 12/27/2014  4:26 PM Full Code [id number]  Jian Hartley MD Outpatient    11/13/2014 11:04 AM 12/9/2014  2:02 PM Full Code [id number]  Jian Hartley MD Outpatient    11/10/2014  6:44 PM 11/13/2014 11:04 AM Full Code [id number]  Mike Wong MD Inpatient    9/14/2014  1:12 PM 11/10/2014  6:44 PM Full Code [id number]  Jian Hartley MD Outpatient    9/12/2014  1:02 PM 9/14/2014  1:12 PM Full Code [id number]  Radha Field PA-C Inpatient    7/20/2014  9:02 AM 9/12/2014  1:02 PM Full Code [id number]  Cesar Lafleur MD Outpatient    7/15/2014  5:49 PM 7/20/2014  9:02 AM Full Code [id number]  Radha Field PA-C Inpatient    2/22/2014  4:21 AM 2/22/2014  2:33 PM Full Code [id number]  Mike Ogden MD ED    1/17/2014  8:18 PM 1/18/2014 12:34 PM Full Code [id number]  Judson Gan RN Inpatient    2/1/2013  2:01 PM 1/17/2014  8:18 PM Full Code [id number]  Nadira Mccann MD Outpatient    2/1/2013 12:18 AM 2/1/2013  2:01 PM Full Code [id number]  Rajinder Dwyer MD Inpatient    1/31/2013  9:39 PM 2/1/2013 12:18 AM Full Code [id number]  Ricardo  Arash GROVES MD Outpatient    1/31/2013  7:09 PM 1/31/2013  9:39 PM Full Code [id number]  Radha Field PA-C Inpatient    7/10/2008  2:44 PM 1/31/2013  7:09 PM None None HEALTH CARE DIRECTIVE ON FILE 7-3-08 [employer] [employer]         Medication Review      START taking        Dose / Directions Comments    cefdinir 300 MG capsule   Commonly known as:  OMNICEF   Indication:  Infection of Blood or Tissues affecting the Whole Body, Urinary Tract Infection   Used for:  Urinary tract infection without hematuria, site unspecified        Dose:  300 mg   Take 1 capsule (300 mg) by mouth daily for 7 days   Refills:  0        * insulin aspart 100 UNIT/ML injection   Commonly known as:  NovoLOG PEN   Used for:  Type 2 diabetes mellitus with other circulatory complications (H)        Dose:  1-7 Units   Inject 1-7 Units Subcutaneous 3 times daily (with meals) Correction Scale - MEDIUM INSULIN RESISTANCE DOSING    Do Not give Correction Insulin if BG < 140. For  - 189 give 1 unit. For  - 239 give 2 units. For  - 289 give 3 units. For  - 339 give 4 units. For BG = or > 340 give 5 units. Check blood glucose before meals/bolus feedings and administer based on blood glucose. Notify MD if glu > or = 350   Refills:  0        * insulin aspart 100 UNIT/ML injection   Commonly known as:  NovoLOG PEN   Used for:  Type 2 diabetes mellitus with other circulatory complications (H)        Dose:  1-5 Units   Inject 1-5 Units Subcutaneous At Bedtime Bedtime Blood Glucose (BG) For  - 249 give 1 units.  For  - 299 give 2 units.  For  - 349 give 3 units. For - 399 give 4 units.  For BG = or > 400 give 5 units.   Refills:  0        metoprolol 50 MG tablet   Commonly known as:  LOPRESSOR        Dose:  50 mg   Take 1 tablet (50 mg) by mouth 2 times daily   Quantity:  60 tablet   Refills:  0        * Notice:  This list has 2 medication(s) that are the same as other medications prescribed for you.  Read the directions carefully, and ask your doctor or other care provider to review them with you.      CONTINUE these medications which may have CHANGED, or have new prescriptions. If we are uncertain of the size of tablets/capsules you have at home, strength may be listed as something that might have changed.        Dose / Directions Comments    pramipexole 1 MG tablet   Commonly known as:  MIRAPEX   This may have changed:  See the new instructions.   Used for:  Restless legs syndrome (RLS), Hyperlipidemia LDL goal <100, Hypertension goal BP (blood pressure) < 140/90        TAKE 3 TABLETS BY MOUTH EVERY EVENING   Quantity:  270 tablet   Refills:  2        simvastatin 40 MG tablet   Commonly known as:  ZOCOR   This may have changed:  See the new instructions.   Used for:  Hypertension goal BP (blood pressure) < 140/90, Hyperlipidemia LDL goal <100        TAKE ONE TABLET BY MOUTH EVERY NIGHT AT BEDTIME   Quantity:  90 tablet   Refills:  3        warfarin 2.5 MG tablet   Commonly known as:  COUMADIN   This may have changed:  additional instructions   Used for:  Long-term (current) use of anticoagulants, Paroxysmal atrial fibrillation (H)        as directed by the coumadin clinic   Quantity:  180 tablet   Refills:  0          CONTINUE these medications which have NOT CHANGED        Dose / Directions Comments    ASPIRIN NOT PRESCRIBED   Commonly known as:  INTENTIONAL   Used for:  Type 2 diabetes mellitus with other circulatory complications (H)        Antiplatelet medication not prescribed intentionally due to Current anticoagulant therapy (warfarin/enoxaparin)   Quantity:  0 each   Refills:  0        calcium carbonate-vitamin D 500-400 MG-UNIT Tabs per tablet   Used for:  Closed intertrochanteric fracture of left hip, initial encounter (H)        Dose:  1 tablet   Take 1 tablet by mouth 3 times daily   Quantity:  180 tablet   Refills:  3        cyanocobalamin 1000 MCG/ML injection   Commonly known as:  VITAMIN B12    Used for:  Vitamin B12 deficiency (non anemic)        Dose:  1 mL   Inject 1 mL (1,000 mcg) into the muscle every 30 days   Quantity:  1 mL   Refills:  11        ferrous sulfate 325 (65 FE) MG tablet   Commonly known as:  IRON   Used for:  Anemia, unspecified type        Dose:  325 mg   Take 1 tablet (325 mg) by mouth daily (with breakfast)   Quantity:  100 tablet   Refills:  0        furosemide 40 MG tablet   Commonly known as:  LASIX   Used for:  Lymphedema        40 mg a day   Quantity:  90 tablet   Refills:  3        gabapentin 300 MG capsule   Commonly known as:  NEURONTIN   Used for:  Restless leg syndrome        Dose:  300 mg   Take 1 capsule (300 mg) by mouth 2 times daily   Quantity:  180 capsule   Refills:  1        isosorbide mononitrate 30 MG 24 hr tablet   Commonly known as:  IMDUR   Used for:  Hx of non-ST elevation myocardial infarction (NSTEMI)        Dose:  90 mg   Take 3 tablets (90 mg) by mouth daily   Quantity:  30 tablet   Refills:  2        nitroglycerin 0.4 MG sublingual tablet   Commonly known as:  NITROSTAT   Used for:  Postsurgical aortocoronary bypass status        Dose:  0.4 mg   Place 1 tablet under the tongue See Admin Instructions. for chest pain   Quantity:  25 tablet   Refills:  0        order for DME   Used for:  ANABEL (obstructive sleep apnea)        Equipment being ordered: cpap machine, mask, humidifier, tubing and filters   Quantity:  1 Device   Refills:  0          STOP taking     glipiZIDE 10 MG 24 hr tablet   Commonly known as:  glipiZIDE XL           metoprolol 100 MG 24 hr tablet   Commonly known as:  TOPROL-XL                   After Care     Activity - Up with nursing assistance           Advance Diet as Tolerated       Follow this diet upon discharge: Orders Placed This Encounter      Room Service      Consistent Carbohydrate Diet 1514-1487 Calories: Moderate Consistent CHO (4-6 CHO units/meal)       General info for SNF       Length of Stay Estimate: Short Term Care:  Estimated # of Days <30  Condition at Discharge: Improving  Level of care:skilled   Rehabilitation Potential: Good  Admission H&P remains valid and up-to-date: Yes  Recent Chemotherapy: N/A  Use Nursing Home Standing Orders: Yes       Glucose monitor nursing POCT       Before meals and at bedtime       Mantoux instructions       Give two-step Mantoux (PPD) Per Facility Policy Yes             Referrals     Occupational Therapy Adult Consult       Evaluate and treat as clinically indicated.    Reason:  Weakness at home       Physical Therapy Adult Consult       Evaluate and treat as clinically indicated.    Reason:  Weakness at home             Follow-Up Appointment Instructions     Future Labs/Procedures    Follow Up and recommended labs and tests     Comments:    Follow up with [facility] physician.  The following labs/tests are recommended: follow-up on  Renal function, potentially restart glipizide when resolved.      Follow-Up Appointment Instructions     Follow Up and recommended labs and tests       Follow up with [facility] physician.  The following labs/tests are recommended: follow-up on  Renal function, potentially restart glipizide when resolved.             Statement of Approval     Ordered          05/02/17 1002  I have reviewed and agree with all the recommendations and orders detailed in this document.  EFFECTIVE NOW     Approved and electronically signed by:  Jian Hartley MD

## 2017-04-28 NOTE — ED PROVIDER NOTES
History     Chief Complaint   Patient presents with     Tachycardia     HPI  Kathryn Banks is a 77 year old female who presents by ambulance after a physical therapist became concerned when they were there for a visit.  They were unable to enter the home and the patient did not respond to door bell and knocking.  They were finally able to get ahold of her by phone and found that she had been on the toilet tonight.  Patient does not recall exactly what happened but thinks she may have fallen asleep.  She denies fall or injury.  She had recent hip surgery and has been rehabbing with home therapy.  When paramedics arrived they noted the patient to be in A. fib with RVR with a rate in the 140s to 150.  She was tolerating this well with a normal blood pressure.  She felt generally weak but denied headache, neck pain, chest pain, or shortness of breath.  She denies abdominal pain, nausea or vomiting.  She admits to dysuria and frequency but denies hematuria or flank pain.  No diarrhea.  She has chronic leg swelling and this unchanged.  She is on Coumadin.  She has had previous NSTEMI.  She is unsure if she took her evening meds but has not had her morning meds.  Blood sugar was 191.  Patient is a type II diabetic.  She denies recent illness or exposure to infectious illness.  She denies fever or chills.  She does not have any focal motor weakness or sensory changes.  No difficulty with speech or swallowing.  Past medical history shows she's had paroxysmal A. fib.  She did have the cardiac echo in July 2016.  This did show a left ventricular diastolic dysfunction.  Her EF was estimated at 55-60%.  Patient did have regurgitation noted on all the pelvis.  There is 1+ mitral regurg and 1-2+ tricuspid regurg.  There was trace aortic and pulmonic regurgitation.  At that time she was noted to be in a normal sinus rhythm.  She did have cardiac echo was done throughoral but unfortunately cannot get the results of those  irene.  They were done in February of this year.    I have reviewed the Medications, Allergies, Past Medical and Surgical History, and Social History in the Epic system.    Review of Systems all other systems were reviewed and are negative.  Past Medical History:   Diagnosis Date     Atrial fibrillation (H) 1/17/2007     Carpal tunnel syndrome      Coronary atherosclerosis of native coronary artery 2006    5 vessel CAD     OBSTRUCTIVE SLEEP APNEA     using CPAP     Osteoarthrosis, unspecified whether generalized or localized, unspecified site     generalized arthritis, particularly in her hands and feet         Other and combined forms of senile cataract 2000    Bilateral     Other and unspecified hyperlipidemia      RESTLESS LEGS SYNDROME      TENOSYNOV HAND/WRIST NEC 6/30/2006     Type II or unspecified type diabetes mellitus without mention of complication, not stated as uncontrolled 2001    Diabetes mellitus/pt is diet controlled with weight loss     Unspecified essential hypertension      Patient Active Problem List   Diagnosis     Restless leg syndrome     Sleep apnea     Lipoma of other skin and subcutaneous tissue     ESOPHAGEAL REFLUX     Postsurgical aortocoronary bypass status     History of peptic ulcer disease     Chronic kidney disease, stage III (moderate)     Obesity     Edema     Kidney stone     Hyperlipidemia LDL goal <100     Advanced directives, counseling/discussion     ACS (acute coronary syndrome) (H)     CAD - NSTEMI, CABG x 5 2007, angio in 2013 with patent grafts other than occluded graft to PL     Hx of non-ST elevation myocardial infarction (NSTEMI)     Paroxysmal atrial fibrillation (H)     Health Care Home     Jaw pain     Hypokalemia     Long term current use of anticoagulant therapy     Lymphedema     Anemia     Hyperkalemia     Renal failure     Osteoarthritis of hip     Non morbid obesity, unspecified obesity type     Type 2 diabetes mellitus with other circulatory complications  (H)     Long-term (current) use of anticoagulants [Z79.01]     Pulmonary infiltrate     Acute kidney injury (H)     Fracture of hip, left, closed, initial encounter (H)     SIRS (systemic inflammatory response syndrome) (H)     Acute respiratory failure with hypoxia (H)     Essential hypertension with goal blood pressure less than 140/90     Trochanteric bursitis of left hip     Current Facility-Administered Medications   Medication     metoprolol (LOPRESSOR) injection 5 mg     0.9% sodium chloride infusion     HYDROmorphone (PF) (DILAUDID) injection 0.5 mg     diltiazem (CARDIZEM) 125 mg in NaCl 0.9 % 125 mL infusion     Current Outpatient Prescriptions   Medication     warfarin (COUMADIN) 2.5 MG tablet     cyanocobalamin (VITAMIN B12) 1000 MCG/ML injection     isosorbide mononitrate (IMDUR) 30 MG 24 hr tablet     metoprolol (TOPROL-XL) 100 MG 24 hr tablet     furosemide (LASIX) 40 MG tablet     gabapentin (NEURONTIN) 300 MG capsule     glipiZIDE (GLIPIZIDE XL) 10 MG 24 hr tablet     ferrous sulfate (IRON) 325 (65 FE) MG tablet     calcium carbonate-vitamin D 500-400 MG-UNIT TABS tablt     simvastatin (ZOCOR) 40 MG tablet     pramipexole (MIRAPEX) 1 MG tablet     nitroglycerin (NITROSTAT) 0.4 MG SL tablet     ASPIRIN NOT PRESCRIBED (INTENTIONAL)     ORDER FOR DME        Allergies   Allergen Reactions     Ciprofloxacin Nausea and Vomiting     Zofran did not help     Oxycodone Visual Disturbance     Delusions, blackouts      Lisinopril Cough     Penicillins Rash     Sulfa Drugs GI Disturbance     LOSS OF TASTE     Social History     Social History     Marital status:      Spouse name: Urbano Banks     Number of children: 2     Years of education: 13     Occupational History     Ministry coordinator      [employer]     Social History Main Topics     Smoking status: Former Smoker     Years: 10.00     Quit date: 1/1/1969     Smokeless tobacco: Never Used     Alcohol use 0.0 oz/week      0 Standard drinks or equivalent per week      Comment: occasional- 2 drinks per month     Drug use: No     Sexual activity: Yes     Birth control/ protection: Surgical     Other Topics Concern     Parent/Sibling W/ Cabg, Mi Or Angioplasty Before 65f 55m? No     Social History Narrative     Family History   Problem Relation Age of Onset     DIABETES Father      AODM     Neurologic Disorder Father      Parkinson's     DIABETES Paternal Grandmother      adult onset     DIABETES Maternal Grandmother      adult onset     Hypertension No family hx of      CEREBROVASCULAR DISEASE No family hx of      Blood Disease Father       from blood clot from leg to lung immediately after hip fracture     Physical Exam      Physical Exam Gen. alert cooperative female who is somewhat confused over recent events.  HEENT is atraumatic.  Pupils are equal and reactive to light and accommodation.  Extraocular motions are intact. no facial asymmetry.  Lips were dry but oral mucosa was moist.  Speech is clear and concise.  Neck is supple without meningismus or bruits.  Lungs reveal basilar crackles otherwise no adventitious sounds.  Cardiac irregularly irregular and tachycardic in the 140s.  Abdomen is obese but soft and benign.  Back there is left CVA tenderness.  Extremities reveal pitting edema in the lower extremities bilaterally.  She has stasis changes associated with this.  No open or draining lesions.  Neurologically no focal motor or sensory discrepancies are noted.    ED Course     ED Course     Procedures             EKG Interpretation:      Interpreted by Jose Bee  Time reviewed: 10:30  Symptoms at time of EKG: Generalized weakness   Rhythm: atrial fibrillation - rapid and atrial flutter  Rate: Tachycardia  Axis: Normal  Ectopy: premature ventricular contractions (unifocal)  Conduction: normal  ST Segments/ T Waves: Non-specific ST-T wave changes  Q Waves: none  Comparison to prior: Patient has been in sinus tach but  I did not find an EKG showing A. fib flutter.    Clinical Impression: A fib versus a flutter with rapid ventricular response                                                  Results for orders placed or performed during the hospital encounter of 04/28/17   XR Chest 2 Views    Narrative    XR CHEST 2 VW 4/28/2017 11:01 AM    COMPARISON: 7/2/2016    HISTORY: Generalized weakness, atrial fibrillation with rapid  ventricular response.      Impression    IMPRESSION: Cardiac silhouette and pulmonary vasculature are within  normal limits. No focal airspace disease, pleural effusion or  pneumothorax.    ANUP DURAND   CT Head w/o Contrast    Narrative    CT SCAN OF THE HEAD WITHOUT CONTRAST   4/28/2017 11:11 AM     HISTORY: Generalized weakness with confusion.    TECHNIQUE:  Axial images of the head and coronal reformations without  IV contrast material.  Radiation dose for this scan was reduced using  automated exposure control, adjustment of the mA and/or kV according  to patient size, or iterative reconstruction technique.    COMPARISON: 11/14/2012    FINDINGS: The ventricles are normal in size, shape, and configuration.  The brain parenchyma and subarachnoid spaces are within normal limits.  There is no evidence for intracranial hemorrhage, mass effect, acute  infarct, or skull fracture. Visualized paranasal sinuses and mastoid  air cells are clear.      Impression    IMPRESSION: Negative head CT without contrast.    EDUARDO AGUILAR MD   US Renal Limited Portable    Narrative    US RENAL LIMITED PORTABLE 4/28/2017 3:35 PM     HISTORY: Left flank pain.    COMPARISON: CT abdomen and pelvis 6/7/2012    FINDINGS: Exam is limited by patient's inability to completely  cooperate. Right kidney measures 8.4 cm in length and left kidney  measures 8.8 cm in length. This renal length is less than seen on the  prior CT exam, likely due to the fact that the kidneys could not be  completely visualized on the ultrasound. Renal parenchymal  thickness  and echogenicity appears normal where visualized. No hydronephrosis,  mass lesions or abnormal calcifications bilaterally. Urinary bladder  was decompressed.      Impression    IMPRESSION: No significant abnormalities.    EVA SANTOYO MD     *Note: Due to a large number of results and/or encounters for the requested time period, some results have not been displayed. A complete set of results can be found in Results Review.                   Critical Care time:  none               Labs Ordered and Resulted from Time of ED Arrival Up to the Time of Departure from the ED   CBC WITH PLATELETS DIFFERENTIAL - Abnormal; Notable for the following:        Result Value    WBC 22.4 (*)     Hemoglobin 10.7 (*)     MCH 23.8 (*)     MCHC 29.2 (*)     RDW 18.9 (*)     Absolute Neutrophil 20.5 (*)     Absolute Lymphocytes 0.6 (*)     All other components within normal limits   INR - Abnormal; Notable for the following:     INR 3.07 (*)     All other components within normal limits   COMPREHENSIVE METABOLIC PANEL - Abnormal; Notable for the following:     Chloride 117 (*)     Carbon Dioxide 16 (*)     Glucose 190 (*)     Urea Nitrogen 35 (*)     Creatinine 1.44 (*)     GFR Estimate 35 (*)     GFR Estimate If Black 43 (*)     Calcium 7.9 (*)     Albumin 2.6 (*)     Protein Total 5.9 (*)     Alkaline Phosphatase 242 (*)     All other components within normal limits   URINE MACROSCOPIC WITH REFLEX TO MICRO - Abnormal; Notable for the following:     Blood Urine Small (*)     Protein Albumin Urine 30 (*)     Nitrite Urine Positive (*)     Leukocyte Esterase Urine Large (*)     RBC Urine 9 (*)     WBC Urine 98 (*)     Bacteria Urine Many (*)     Mucous Urine Present (*)     All other components within normal limits   BLOOD GAS VENOUS - Abnormal; Notable for the following:     Ph Venous 7.19 (*)     Bicarbonate Venous 15 (*)     All other components within normal limits   LACTIC ACID WHOLE BLOOD   TROPONIN I   TSH WITH FREE  T4 REFLEX   URINE CULTURE AEROBIC BACTERIAL     IV is established and blood work was obtained.  Head CT and chest x-ray ordered.  EKG was ordered and reviewed as above.  Patient is on beta blocker at home.   Patient is currently tolerating this rhythm without chest pain, shortness of breath, and has a stable blood pressure.  She is given IV fluids.  She requests something back pain and is given Tylenol.  Urinalysis is markedly positive.  Culture is pending.  With her CVA tenderness and history kidney stones will get a renal ultrasound to assess for pyonephritis or obstruction.  We did recheck her O2 saturation and it was 98% on room air.  At 1:45 PM patient developed some jaw aching so repeat EKG was obtained.  It showed A. a flutter and PVCs.  No other acute changes.  Patient not taken her metoprolol this morning so she is given IV doses to see if that will slow her heart rate.  After 1 dose of metoprolol her heart rate did not seem to be changed but her jaw pain had resolved.  At 2:55 on recheck the patient had increase in back and leg pain.  So she was given IV Dilaudid.  After discussion with Dr. Ball from the hospital service we will start the patient on low-dose IV diltiazem.  Assessments & Plan (with Medical Decision Making)   Patient is a 77-year-old female brought in by ambulance with concerns for generalized weakness and A. fib RVR.  Unfortunately patient was unable to get off the toilet since last evening and spent the evening sleeping there.  She did not fall or head injury.  One physical therapist came to assess her in the morning for her recent hip fracture.  She did not answer the door.  They were able to finally get her by phone and were able to assess her finding her to be in A. fib with RVR.  Paramedics arrived and brought the patient for evaluation.  Her blood sugar was 191 on check.  She was afebrile.  She was tachycardic in the 140-150 range.  Her blood pressure was 130 systolically.  Denied  chest pain, shortness of breath or cough.  She felt lightheaded and generally weak.  She denied abdominal pain.  She's had no diarrhea or dark/bloody stools.  She does admit to urinary frequency, dysuria, and left flank pain.  She has chronic lymphedema in her legs with stasis changes.  Patient's blood work noted a elevated white count and BUN and creatinine.  Analysis was markedly positive and a culture is ordered.  She was given IV fluids and IV Rocephin.  She does have a history of kidney stones on ultrasound was ordered to assess for hydronephrosis, renal stone, or pyelonephritis.  Despite treatment with IV fluids her heart rate persisted in the 140 range.  She tolerated this without significant adverse reaction.  We initially gave her doses of IV Lopressor at 5 minute intervals with status slight improvement.  She suddenly started on a diltiazem drip at low dose.  Suspect the patient has urinary tract infection possible pyelonephritis.  This may be causing her to be in A. fib/flutter.  She has had recent echocardiogram described above.  She is on Coumadin for history of intermittent A. fib.  Her INR was in the therapeutic range.  Patient's initial venous blood gas showed acidosis but normal O2 and CO2.  We did obtain a chest x-ray showed no acute infiltrate.  With her weakness and mild confusion CT of the head was obtained and was normal.  Suspect his symptoms are related to her infection.  She'll be admitted to the ICU with IV fluids, IV antibiotics, diltiazem drip, and serial blood work can be obtained.  Dr. Ball from the hospital service was apprised and will follow on admission.  Renal ultrasound results showed no evidence of present hydronephrosis/ pyelonephritis.  When new nursing staff assumed her care there concerned that she seemed somewhat more confused and her right medial thigh looked more red than previous.  I talked to the patient about this she seemed to recognize me and did not seem acutely  confused.  She did however have new redness in the medial thigh that was nonfluctuant.  This was demarcated.  Blood cultures were obtained.  IV Vanco was added to her antibiotics.  Dr. Ball was updated.  I have reviewed the nursing notes.      I have reviewed the findings, diagnosis, plan and need for follow up with the patient.    New Prescriptions    No medications on file       Final diagnoses:   Urinary tract infection without hematuria, site unspecified   Acute pyelonephritis   Atrial fibrillation with rapid ventricular response (H)   Atrial flutter, unspecified type (H)       4/28/2017   [facility] EMERGENCY DEPARTMENT     Jose Bee MD  04/28/17 [id number]       Jose Bee MD  04/28/17 [id number]       Jose Bee MD  04/28/17 [id number]

## 2017-04-29 PROBLEM — D72.829 LEUKOCYTOSIS: Status: ACTIVE | Noted: 2017-04-29

## 2017-04-29 PROBLEM — L03.119 CELLULITIS OF LEG WITHOUT FOOT: Status: ACTIVE | Noted: 2017-04-29

## 2017-04-29 LAB
ANION GAP SERPL CALCULATED.3IONS-SCNC: 11 MMOL/L (ref 3–14)
ANION GAP SERPL CALCULATED.3IONS-SCNC: 9 MMOL/L (ref 3–14)
BACTERIA SPEC CULT: ABNORMAL
BACTERIA SPEC CULT: NORMAL
BASOPHILS # BLD AUTO: 0 10E9/L (ref 0–0.2)
BASOPHILS NFR BLD AUTO: 0.3 %
BUN SERPL-MCNC: 40 MG/DL (ref 7–30)
BUN SERPL-MCNC: 45 MG/DL (ref 7–30)
CALCIUM SERPL-MCNC: 7.2 MG/DL (ref 8.5–10.1)
CALCIUM SERPL-MCNC: 7.5 MG/DL (ref 8.5–10.1)
CHLORIDE SERPL-SCNC: 120 MMOL/L (ref 94–109)
CHLORIDE SERPL-SCNC: 121 MMOL/L (ref 94–109)
CO2 SERPL-SCNC: 14 MMOL/L (ref 20–32)
CO2 SERPL-SCNC: 15 MMOL/L (ref 20–32)
CREAT SERPL-MCNC: 1.77 MG/DL (ref 0.52–1.04)
CREAT SERPL-MCNC: 1.92 MG/DL (ref 0.52–1.04)
DIFFERENTIAL METHOD BLD: ABNORMAL
EOSINOPHIL # BLD AUTO: 0.1 10E9/L (ref 0–0.7)
EOSINOPHIL NFR BLD AUTO: 0.7 %
ERYTHROCYTE [DISTWIDTH] IN BLOOD BY AUTOMATED COUNT: 19 % (ref 10–15)
GFR SERPL CREATININE-BSD FRML MDRD: 25 ML/MIN/1.7M2
GFR SERPL CREATININE-BSD FRML MDRD: 28 ML/MIN/1.7M2
GLUCOSE BLDC GLUCOMTR-MCNC: 105 MG/DL (ref 70–99)
GLUCOSE BLDC GLUCOMTR-MCNC: 123 MG/DL (ref 70–99)
GLUCOSE BLDC GLUCOMTR-MCNC: 129 MG/DL (ref 70–99)
GLUCOSE BLDC GLUCOMTR-MCNC: 144 MG/DL (ref 70–99)
GLUCOSE BLDC GLUCOMTR-MCNC: 72 MG/DL (ref 70–99)
GLUCOSE SERPL-MCNC: 133 MG/DL (ref 70–99)
GLUCOSE SERPL-MCNC: 172 MG/DL (ref 70–99)
HCT VFR BLD AUTO: 31.5 % (ref 35–47)
HGB BLD-MCNC: 9 G/DL (ref 11.7–15.7)
IMM GRANULOCYTES # BLD: 0 10E9/L (ref 0–0.4)
IMM GRANULOCYTES NFR BLD: 0.2 %
INR PPP: 5.13 (ref 0.86–1.14)
LACTATE BLD-SCNC: 0.9 MMOL/L (ref 0.7–2.1)
LYMPHOCYTES # BLD AUTO: 1.3 10E9/L (ref 0.8–5.3)
LYMPHOCYTES NFR BLD AUTO: 10 %
MAGNESIUM SERPL-MCNC: 2.1 MG/DL (ref 1.6–2.3)
MCH RBC QN AUTO: 23.6 PG (ref 26.5–33)
MCHC RBC AUTO-ENTMCNC: 28.6 G/DL (ref 31.5–36.5)
MCV RBC AUTO: 83 FL (ref 78–100)
MICRO REPORT STATUS: ABNORMAL
MICRO REPORT STATUS: NORMAL
MICROORGANISM SPEC CULT: ABNORMAL
MONOCYTES # BLD AUTO: 0.5 10E9/L (ref 0–1.3)
MONOCYTES NFR BLD AUTO: 4.2 %
NEUTROPHILS # BLD AUTO: 10.9 10E9/L (ref 1.6–8.3)
NEUTROPHILS NFR BLD AUTO: 84.6 %
PLATELET # BLD AUTO: 246 10E9/L (ref 150–450)
POTASSIUM SERPL-SCNC: 4.8 MMOL/L (ref 3.4–5.3)
POTASSIUM SERPL-SCNC: 5.6 MMOL/L (ref 3.4–5.3)
RBC # BLD AUTO: 3.81 10E12/L (ref 3.8–5.2)
SODIUM SERPL-SCNC: 145 MMOL/L (ref 133–144)
SODIUM SERPL-SCNC: 145 MMOL/L (ref 133–144)
SPECIMEN SOURCE: ABNORMAL
SPECIMEN SOURCE: NORMAL
WBC # BLD AUTO: 12.9 10E9/L (ref 4–11)

## 2017-04-29 PROCEDURE — 25000128 H RX IP 250 OP 636: Performed by: EMERGENCY MEDICINE

## 2017-04-29 PROCEDURE — 25000132 ZZH RX MED GY IP 250 OP 250 PS 637: Mod: GY | Performed by: FAMILY MEDICINE

## 2017-04-29 PROCEDURE — 83605 ASSAY OF LACTIC ACID: CPT | Performed by: FAMILY MEDICINE

## 2017-04-29 PROCEDURE — 83735 ASSAY OF MAGNESIUM: CPT | Performed by: FAMILY MEDICINE

## 2017-04-29 PROCEDURE — 20000003 ZZH R&B ICU

## 2017-04-29 PROCEDURE — 80048 BASIC METABOLIC PNL TOTAL CA: CPT | Performed by: FAMILY MEDICINE

## 2017-04-29 PROCEDURE — A9270 NON-COVERED ITEM OR SERVICE: HCPCS | Mod: GY | Performed by: FAMILY MEDICINE

## 2017-04-29 PROCEDURE — 36415 COLL VENOUS BLD VENIPUNCTURE: CPT | Performed by: FAMILY MEDICINE

## 2017-04-29 PROCEDURE — 25000128 H RX IP 250 OP 636: Performed by: FAMILY MEDICINE

## 2017-04-29 PROCEDURE — 85025 COMPLETE CBC W/AUTO DIFF WBC: CPT | Performed by: FAMILY MEDICINE

## 2017-04-29 PROCEDURE — 99233 SBSQ HOSP IP/OBS HIGH 50: CPT | Performed by: FAMILY MEDICINE

## 2017-04-29 PROCEDURE — 85610 PROTHROMBIN TIME: CPT | Performed by: FAMILY MEDICINE

## 2017-04-29 PROCEDURE — 27210995 ZZH RX 272: Performed by: FAMILY MEDICINE

## 2017-04-29 PROCEDURE — 25000125 ZZHC RX 250: Performed by: EMERGENCY MEDICINE

## 2017-04-29 PROCEDURE — 00000146 ZZHCL STATISTIC GLUCOSE BY METER IP

## 2017-04-29 RX ORDER — ISOSORBIDE MONONITRATE 30 MG/1
90 TABLET, EXTENDED RELEASE ORAL DAILY
Status: DISCONTINUED | OUTPATIENT
Start: 2017-04-29 | End: 2017-04-29

## 2017-04-29 RX ORDER — MAGNESIUM SULFATE HEPTAHYDRATE 40 MG/ML
2 INJECTION, SOLUTION INTRAVENOUS DAILY PRN
Status: DISCONTINUED | OUTPATIENT
Start: 2017-04-29 | End: 2017-05-02 | Stop reason: HOSPADM

## 2017-04-29 RX ORDER — MAGNESIUM SULFATE HEPTAHYDRATE 40 MG/ML
4 INJECTION, SOLUTION INTRAVENOUS EVERY 4 HOURS PRN
Status: DISCONTINUED | OUTPATIENT
Start: 2017-04-29 | End: 2017-05-02 | Stop reason: HOSPADM

## 2017-04-29 RX ORDER — METOPROLOL TARTRATE 50 MG
50 TABLET ORAL ONCE
Status: COMPLETED | OUTPATIENT
Start: 2017-04-29 | End: 2017-04-29

## 2017-04-29 RX ORDER — METOPROLOL SUCCINATE 50 MG/1
150 TABLET, EXTENDED RELEASE ORAL DAILY
Status: DISCONTINUED | OUTPATIENT
Start: 2017-04-29 | End: 2017-04-29

## 2017-04-29 RX ORDER — SODIUM CHLORIDE 450 MG/100ML
INJECTION, SOLUTION INTRAVENOUS CONTINUOUS
Status: DISCONTINUED | OUTPATIENT
Start: 2017-04-29 | End: 2017-04-30

## 2017-04-29 RX ADMIN — HYDROCODONE BITARTRATE AND ACETAMINOPHEN 1 TABLET: 5; 325 TABLET ORAL at 06:51

## 2017-04-29 RX ADMIN — GLIPIZIDE 10 MG: 5 TABLET, EXTENDED RELEASE ORAL at 09:26

## 2017-04-29 RX ADMIN — VANCOMYCIN HYDROCHLORIDE 1250 MG: 1 INJECTION, POWDER, LYOPHILIZED, FOR SOLUTION INTRAVENOUS at 16:54

## 2017-04-29 RX ADMIN — SODIUM CHLORIDE, PRESERVATIVE FREE 1000 ML: 5 INJECTION INTRAVENOUS at 05:06

## 2017-04-29 RX ADMIN — METOPROLOL TARTRATE 12.5 MG: 25 TABLET, FILM COATED ORAL at 14:15

## 2017-04-29 RX ADMIN — SODIUM CHLORIDE 500 ML: 9 INJECTION, SOLUTION INTRAVENOUS at 09:24

## 2017-04-29 RX ADMIN — PRAMIPEXOLE DIHYDROCHLORIDE 1 MG: 0.5 TABLET ORAL at 21:20

## 2017-04-29 RX ADMIN — METOPROLOL TARTRATE 50 MG: 50 TABLET, FILM COATED ORAL at 02:02

## 2017-04-29 RX ADMIN — CEFTRIAXONE SODIUM 1 G: 1 INJECTION, POWDER, FOR SOLUTION INTRAMUSCULAR; INTRAVENOUS at 09:24

## 2017-04-29 RX ADMIN — GABAPENTIN 300 MG: 300 CAPSULE ORAL at 21:18

## 2017-04-29 RX ADMIN — SODIUM CHLORIDE: 4.5 INJECTION, SOLUTION INTRAVENOUS at 11:19

## 2017-04-29 RX ADMIN — SODIUM CHLORIDE: 4.5 INJECTION, SOLUTION INTRAVENOUS at 21:16

## 2017-04-29 RX ADMIN — GABAPENTIN 300 MG: 300 CAPSULE ORAL at 09:32

## 2017-04-29 NOTE — CONSULTS
Clinical Pharmacy - Warfarin Dosing Consult     Pharmacy has been consulted to manage this patient s warfarin therapy.  Indication: Atrial Fibrillation  Therapy Goal: INR 2-3  Warfarin Prior to Admission: Yes  Warfarin PTA Regimen: 7.5mg Fri, 5mg all other days of the week  Significant drug interactions: APAP, ceftriaxone, norco, vancomycin  Recent documented change in oral intake/nutrition: Unknown    INR   Date Value Ref Range Status   04/28/2017 3.07 (H) 0.86 - 1.14 Final   04/26/2017 2.0  Final       Recommend warfarin 2.5 mg today.  Pharmacy will monitor Kathryn Banks daily and order warfarin doses to achieve specified goal.      Please contact pharmacy as soon as possible if the warfarin needs to be held for a procedure or if the warfarin goals change.      Ricardo Noriega, PharmD. Pharmacy Resident

## 2017-04-29 NOTE — PLAN OF CARE
Problem: Goal Outcome Summary  Goal: Goal Outcome Summary  Pt remains in a fib/flutter however rate has decreased and is now in the 70's. Cardizem discontinued due to low blood pressures. 500ml NS bolus given x 1. Pressures 70-80's/40-60's. Pt was on room air but sats decreased to 88% when pt fell asleep so 1L applied. Pt has been very sleepy since daughter left often falling asleep mid sentence but awakens easily. Lower extremities occasionally weeping. Trace edema but skin is very tight. Left upper thigh has reddened area that is warm to the touch. Area outlined for monitoring. Pt complains of significant left hip pain from previous fracture. Norco given and ice applied.

## 2017-04-29 NOTE — H&P
[facility]    History and Physical  Hospitalist       Date of Admission:  4/28/2017  Date of Service (when I saw the patient): 04/28/17    Assessment & Plan   Kathryn Banks is a 77 year old female who presents with rapid heart rate. She was found at home asleep on the toilet and when EMS examiner found her to be in atrial fibrillation with rapid response. On arrival to the emergency department she had a rate of 140, which did not respond to IV beta blocker and she has now been started on diltiazem infusion. She has a history of paroxysmal atrial fibrillation in the past taking Toprol and is on chronic Coumadin therapy. She lives alone, recovering from right hip surgery. She states that she fell asleep on the toilet and then awoke with these symptoms. She denies chest pain nausea or vomiting. She has noted some urinary frequency and the urinalysis is showing an UTI. She developed a fever after admission and continues with tachycardia and has a white blood cell count of 22,400. At this point she probably asepsis secondary to a UTI. Blood cultures in urine culture pending. She has been started on broad-spectrum antibiotics. Also noted is a red area over her left upper thigh concerning for possible cellulitis. She is being admitted to the ICU with diltiazem infusion. Her blood pressures are borderline low so if not converting or becomes more symptomatic, may need to switch her to an amiodarone infusion. Expect she'll be in the hospital for at least 2 days.     Principal Problem:    Atrial fibrillation with rapid ventricular response (H)    Assessment: presenting issue with tachycardia in the 140s. As minimal complaints with no chest pain and only mild shortness of breath. Could be triggered by sepsis, although she has not taken her meds including Toprol today.    Plan: continue diltiazem infusion. If issues with blood pressure consider switching to amiodarone. Continue Coumadin  prophylaxis.  Active Problems:    Sepsis (H)    Assessment: presenting with some weakness, and now has a fever with the above tachycardia, leukocytosis. Lactic acid is normal at 1.1 renal function is stable.    Plan: IV fluids having given. Monitor urine output and blood pressure.    Acute cystitis without hematuria    Assessment: having some dysuria without other symptoms.    Plan: cultures pending, currently on Zosyn and vancomycin    Chronic kidney disease, stage III (moderate)    Assessment: chronic, mildly elevated creatinine today    Plan: IV fluids, follow    CAD - NSTEMI, CABG x 5 2007, angio in 2013 with patent grafts other than occluded graft to PL    Assessment: no current symptoms, denies chest pain.    Plan: restart her Imdur and Toprol as able    Type 2 diabetes mellitus with other circulatory complications (H)    Assessment: taking glipizide, sugars typically in the 130 to 140 range    Plan: continue glipizide, will add sliding scale coverages well    Essential hypertension with goal blood pressure less than 140/90    Assessment: low pressures right now    Plan: Toprol and Imdur at this point.    Restless leg syndrome    Assessment: taking Requip    Plan: continue    Sleep apnea    Assessment: not using CPAP at this time    Plan: oxygen as needed to keep stats above 92%    Long term current use of anticoagulant therapy    Assessment: taking Coumadin for atrial fibrillation    Plan: continue    Lymphedema    Assessment: chronic issue, likely due to prolonged sitting    Plan: follow  DVT Prophylaxis: Warfarin  Code Status: Full Code    Disposition: Expected discharge in 2-3 days once heart rate controlled, feeling better.    Jian Hartley MD    Primary Care Physician   Dheeraj Jj    Chief Complaint   77-year-old with rapid heart rate    History is obtained from the patient, electronic health record, emergency department physician and patient's daughter    History of Present Illness   Kathryn WEEKS  Jocelyn is a 77 year old female who presents with rapid heart rate secondary to atrial fibrillation. She lives alone at home, recovering from hip surgery in January. She states that she fell asleep on the commode last evening. Her home healthcare physical therapist stopped by this morning and could not get an answer from her. She was concerned, called EMS and ultimately they were able to get hold of the patient by phone and she came and opened the door. Evaluation by EMS on the scene was worrisome for a heart rate of 140. Patient was having some mild shortness of breath but denied any chest pain. She has history of intermittent paroxysmal atrial fibrillation. She takes chronic Coumadin and beta blocker, although states she is not sure if she took her medicines yesterday. She has history of a NSTEMI in 2007 followed by a quadruple bypass. She states that home she's not had the nausea or vomiting. She's been more tired but denies any fever, chills, sweats. She notes some urinary frequency but no dysuria or hematuria. Her legs are chronically swollen, may be somewhat more red over the upper left thigh. She denies any open sores. She is diabetic taking glipizide, checking blood sugars typically in the 140 range.    Past Medical History    I have reviewed this patient's medical history and updated it with pertinent information if needed.   Past Medical History:   Diagnosis Date     Atrial fibrillation (H) 1/17/2007     Carpal tunnel syndrome      Coronary atherosclerosis of native coronary artery 2006    5 vessel CAD     OBSTRUCTIVE SLEEP APNEA     using CPAP     Osteoarthrosis, unspecified whether generalized or localized, unspecified site     generalized arthritis, particularly in her hands and feet         Other and combined forms of senile cataract 2000    Bilateral     Other and unspecified hyperlipidemia      RESTLESS LEGS SYNDROME      TENOSYNOV HAND/WRIST NEC 6/30/2006     Type II or unspecified type diabetes  mellitus without mention of complication, not stated as uncontrolled 2001    Diabetes mellitus/pt is diet controlled with weight loss     Unspecified essential hypertension        Past Surgical History   I have reviewed this patient's surgical history and updated it with pertinent information if needed.  Past Surgical History:   Procedure Laterality Date     ANGIOPLASTY       C APPENDECTOMY       C CABG, VEIN, FIVE  12/06    SVG x 4 and LIMA to LAD     C SOTO W/O FACETEC FORAMOT/DSKC 1/2 VRT SEG, LUMBAR  1968     C REMV CATARACT INTRACAP,INSERT LENS      Bilateral     C TOTAL ABDOM HYSTERECTOMY  1995     - fibroids     C VAGOTOMY/PYLOROPLASTY,SELECT  1970     COLONOSCOPY  12/3/2007     COLONOSCOPY  1/17/2014    Procedure: COLONOSCOPY;  Colonoscopy;  Surgeon: Zack Stearns MD;  Location:  GI     CYSTOSCOPY  11/25/09    [facility]     ENDOSCOPY  03/21/2000    Upper GI     HC COLONOSCOPY THRU STOMA, DIAGNOSTIC  10/7/04    poor prep, repeat in 2-3 years     HC COLONOSCOPY W/WO BRUSH/WASH  10/31/07    Repeat-poor quality prep     HC REMOVAL OF OVARY/TUBE(S)      Salpingo-Oophorectomy, Unilateral     HC REVISE MEDIAN N/CARPAL TUNNEL SURG      Carpal tunnel release     HC UGI ENDOSCOPY DIAG W BIOPSY  10/31/07     HC UGI ENDOSCOPY, SIMPLE EXAM  12/3/2007     OPEN REDUCTION INTERNAL FIXATION HIP NAILING Left 7/3/2016    Procedure: OPEN REDUCTION INTERNAL FIXATION HIP NAILING;  Surgeon: Lake Schulz MD;  Location:  OR       Prior to Admission Medications   Prior to Admission Medications   Prescriptions Last Dose Informant Patient Reported? Taking?   ASPIRIN NOT PRESCRIBED (INTENTIONAL)   Yes No   Sig: Antiplatelet medication not prescribed intentionally due to Current anticoagulant therapy (warfarin/enoxaparin)   ORDER FOR DME   No No   Sig: Equipment being ordered: cpap machine, mask, humidifier, tubing and filters   calcium carbonate-vitamin D 500-400 MG-UNIT TABS tablt 4/27/2017 at 1000  No Yes   Sig: Take 1  tablet by mouth 3 times daily   cyanocobalamin (VITAMIN B12) 1000 MCG/ML injection   No Yes   Sig: Inject 1 mL (1,000 mcg) into the muscle every 30 days   ferrous sulfate (IRON) 325 (65 FE) MG tablet 2017 at 1000  No Yes   Sig: Take 1 tablet (325 mg) by mouth daily (with breakfast)   furosemide (LASIX) 40 MG tablet 2017 at 1000  Yes Yes   Si mg a day   gabapentin (NEURONTIN) 300 MG capsule 2017 at 2200  No Yes   Sig: Take 1 capsule (300 mg) by mouth 2 times daily   glipiZIDE (GLIPIZIDE XL) 10 MG 24 hr tablet 2017 at 1000  No Yes   Sig: Take 1 tablet (10 mg) by mouth daily   isosorbide mononitrate (IMDUR) 30 MG 24 hr tablet 2017 at 1000  Yes Yes   Sig: Take 3 tablets (90 mg) by mouth daily   metoprolol (TOPROL-XL) 100 MG 24 hr tablet 2017 at 1000  Yes Yes   Sig: Take 1.5 tablets (150 mg) by mouth daily   nitroglycerin (NITROSTAT) 0.4 MG SL tablet   No Yes   Sig: Place 1 tablet under the tongue See Admin Instructions. for chest pain   pramipexole (MIRAPEX) 1 MG tablet 2017 at 2200  No Yes   Sig: TAKE 3 TABLETS BY MOUTH EVERY EVENING   simvastatin (ZOCOR) 40 MG tablet 2017 at 1000  No Yes   Sig: TAKE ONE TABLET BY MOUTH EVERY NIGHT AT BEDTIME (NEEDS TO SCHEDULE FASTING LABS BEFORE ADDITIONAL REFILLS)   warfarin (COUMADIN) 2.5 MG tablet 2017 at 2200  No Yes   Sig: Take 7.5 mg (3 tablets) on Friday and 5 mg (2 tablets) all other days of the week, or as directed by the coumadin clinic      Facility-Administered Medications: None     Allergies   Allergies   Allergen Reactions     Ciprofloxacin Nausea and Vomiting     Zofran did not help     Oxycodone Visual Disturbance     Delusions, blackouts      Lisinopril Cough     Penicillins Rash     Sulfa Drugs GI Disturbance     LOSS OF TASTE       Social History   I have reviewed this patient's social history and updated it with pertinent information if needed. Kathryn MICKY Banks  reports that she quit smoking about 48 years ago. She  quit after 10.00 years of use. She has never used smokeless tobacco. She reports that she drinks alcohol. She reports that she does not use illicit drugs.    Family History   I have reviewed this patient's family history and updated it with pertinent information if needed.   Family History   Problem Relation Age of Onset     DIABETES Father      AODM     Neurologic Disorder Father      Parkinson's     DIABETES Paternal Grandmother      adult onset     DIABETES Maternal Grandmother      adult onset     Hypertension No family hx of      CEREBROVASCULAR DISEASE No family hx of      Blood Disease Father       from blood clot from leg to lung immediately after hip fracture       Review of Systems   The 10 point Review of Systems is negative other than noted in the HPI or here.     Physical Exam   Temp: 100.6  F (38.1  C) Temp src: Oral BP: 123/77   Heart Rate: 151 Resp: 10 SpO2: 98 % O2 Device: Nasal cannula with humidification Oxygen Delivery: 2 LPM  Vital Signs with Ranges  Temp:  [97.9  F (36.6  C)-102.5  F (39.2  C)] 100.6  F (38.1  C)  Heart Rate:  [128-151] 151  Resp:  [9-36] 10  BP: (109-168)/() 123/77  SpO2:  [91 %-100 %] 98 %  179 lbs 10.8 oz    EXAM:  Constitutional: alert, no distress and cooperative   Cardiovascular: irregular rapid rates, no murmur heard. Legs with some mild edema by chronic stasis changes.  Respiratory: clear to auscultation, no wheezing erred  Psychiatric: mentation appears normal and affect normal/bright  Head: Normocephalic. No masses, lesions, tenderness or abnormalities  Neck: Neck supple. No adenopathy. Thyroid symmetric, normal size,  ENT: ENT exam normal, no neck nodes or sinus tenderness  Abdomen: Abdomen soft, non-tender. BS normal. No masses, organomegaly  NEURO: nonfocal. Moving arms and legs equally. Cranial nerves intact  SKIN: chronic stasis changes in the lower legs. There is an area of redness and warmth involving the upper medial left thigh. No open sores  LYMPH:  Normal cervical lymph nodes  JOINT/EXTREMITIES: extremities normal- no gross deformities noted and normal muscle tone     Data   Data reviewed today:  I personally reviewed the EKG tracing showing Atrial fibrillation with RVR and the chest x-ray image(s) showing No signs of infiltrate, CT head without acute changes, no bleeds..    Recent Labs  Lab 04/28/17  1050 04/26/17   WBC 22.4*  --    HGB 10.7*  --    MCV 82  --      --    INR 3.07* 2.0     --    POTASSIUM 4.5  --    CHLORIDE 117*  --    CO2 16*  --    BUN 35*  --    CR 1.44*  --    ANIONGAP 10  --    MALICK 7.9*  --    *  --    ALBUMIN 2.6*  --    PROTTOTAL 5.9*  --    BILITOTAL 0.3  --    ALKPHOS 242*  --    ALT 23  --    AST 13  --    TROPI 0.026  --        Recent Results (from the past 24 hour(s))   XR Chest 2 Views    Narrative    XR CHEST 2 VW 4/28/2017 11:01 AM    COMPARISON: 7/2/2016    HISTORY: Generalized weakness, atrial fibrillation with rapid  ventricular response.      Impression    IMPRESSION: Cardiac silhouette and pulmonary vasculature are within  normal limits. No focal airspace disease, pleural effusion or  pneumothorax.    ANUP DURAND   CT Head w/o Contrast    Narrative    CT SCAN OF THE HEAD WITHOUT CONTRAST   4/28/2017 11:11 AM     HISTORY: Generalized weakness with confusion.    TECHNIQUE:  Axial images of the head and coronal reformations without  IV contrast material.  Radiation dose for this scan was reduced using  automated exposure control, adjustment of the mA and/or kV according  to patient size, or iterative reconstruction technique.    COMPARISON: 11/14/2012    FINDINGS: The ventricles are normal in size, shape, and configuration.  The brain parenchyma and subarachnoid spaces are within normal limits.  There is no evidence for intracranial hemorrhage, mass effect, acute  infarct, or skull fracture. Visualized paranasal sinuses and mastoid  air cells are clear.      Impression    IMPRESSION: Negative head CT  without contrast.    EDUARDO AGUILAR MD   US Renal Limited Portable    Narrative    US RENAL LIMITED PORTABLE 4/28/2017 3:35 PM     HISTORY: Left flank pain.    COMPARISON: CT abdomen and pelvis 6/7/2012    FINDINGS: Exam is limited by patient's inability to completely  cooperate. Right kidney measures 8.4 cm in length and left kidney  measures 8.8 cm in length. This renal length is less than seen on the  prior CT exam, likely due to the fact that the kidneys could not be  completely visualized on the ultrasound. Renal parenchymal thickness  and echogenicity appears normal where visualized. No hydronephrosis,  mass lesions or abnormal calcifications bilaterally. Urinary bladder  was decompressed.      Impression    IMPRESSION: No significant abnormalities.    EVA SANTOYO MD

## 2017-04-29 NOTE — PLAN OF CARE
Problem: Goal Outcome Summary  Goal: Goal Outcome Summary  Outcome: Improving  Afebrile.  Monitor shows AFlutter, HR started out in 60's at 2300, then increased to 130's, notified Dr. Hartley and gave lopressor po (cardizem remains off).  HR came down to 100-110.  SBP started out at 2300 in the 70's which was during the 500 bolus started by previous shift.  SBP came up to 90's-100 mostly, with MAP > 65 after bolus.  Pt started out with O2 on at 1L, she has apnea and sats went down to 80's so o2 increased to 2L.  Received tylenol for hip pain.  Skin unchanged, has redness on thigh that is marked.  She has been sleepy, awakens easily.

## 2017-04-29 NOTE — PHARMACY-VANCOMYCIN DOSING SERVICE
Pharmacy Vancomycin Note  Date of Service 2017  Patient's  1940   77 year old, female    Indication: Skin and Soft Tissue Infection  Goal Trough Level: 10-15 mg/L  Day of Therapy: 2  Current Vancomycin regimen:  1250 mg IV q24h    Current estimated CrCl = Estimated Creatinine Clearance: 29 mL/min (based on Cr of 1.77).    Creatinine for last 3 days  2017: 10:50 AM Creatinine 1.44 mg/dL  2017:  5:37 AM Creatinine 1.77 mg/dL    Recent Vancomycin Levels (past 3 days)  No results found for requested labs within last 72 hours.    Vancomycin IV Administrations (past 72 hours)                   vancomycin 1250 mg in 0.9% NaCl 250 mL PREMIX (mg) 1,250 mg New Bag 17 1742                Nephrotoxins and other renal medications (Future)    Start     Dose/Rate Route Frequency Ordered Stop    17 1655  vancomycin 1250 mg in 0.9% NaCl 250 mL PREMIX      1,250 mg Intravenous EVERY 24 HOURS 17 1654               Contrast Orders - past 72 hours     None          Interpretation of levels and current regimen:  Trough level: not obtained yet    Has serum creatinine changed > 50% in last 72 hours: 300 ml reported since admission yesterday    Urine output:  diminished urine output    Renal Function: Worsening    Plan:  1.  Continue Current Dose  2.  Pharmacy will check trough levels as appropriate in 1-3 Days.    3. Serum creatinine levels will be ordered daily for the first week of therapy and at least twice weekly for subsequent weeks.      Clay Carrillo, PharmD      .

## 2017-04-29 NOTE — PHARMACY-ANTICOAGULATION SERVICE
Clinical Pharmacy - Warfarin Dosing Consult     Pharmacy has been consulted to manage this patient s warfarin therapy.  Indication: Atrial Fibrillation  Therapy Goal: INR 2-3  Warfarin Prior to Admission: Yes  Warfarin PTA Regimen: 7.5mg Fri, 5mg all other days of the week  Significant drug interactions: APAP, ceftriaxone, norco, vancomycin  Recent documented change in oral intake/nutrition: Unknown    INR   Date Value Ref Range Status   04/29/2017 5.13 (HH) 0.86 - 1.14 Final     Comment:     Critical Value called to and read back by  SONAL NAGY IN MED SURG AT 0610 ON 4/29/17 DC     04/28/2017 3.07 (H) 0.86 - 1.14 Final       Recommend warfarin ZERO mg today.   Order placed for NO warfarin today. Pharmacy will monitor Kathryn MICKY Rowlandgabriela daily and order warfarin doses to achieve specified goal.      Please contact pharmacy as soon as possible if the warfarin needs to be held for a procedure or if the warfarin goals change.     Clay Carrillo, PharmD

## 2017-04-29 NOTE — PROGRESS NOTES
[facility] Progress Note          Assessment and Plan:   Assessment:   Active Problems:    Sepsis (H)    Assessment: Patient presented with fever, leukocytosis, acute kidney injury and atrial fibrillation with RVR with suspected UTI and known left medial leg cellulitits.  Now having mild hypotension (which may be caused by sepsis vs attempted treatment of atrial fibrillation) and worsening creatinine this morning with low urine output.  Leukocytosis is improving significantly from 22.4 down to 12.9 and lactic acid remains normal.  Blood cultures negative to date, urine culture is already growing out >100,000 gram negative rods.  Patient is starting to clinically improve with increased energy and appetite this morning.      Plan: continue with Rocephin and Vancomycin and consider broadening antibiotics further if concern for worsening sepsis arises.  Continue with IV fluids at 125 cc/hr and monitor urinary output closely, continue to monitor blood pressures and hold Imdur and Toprol XL this morning.  Recheck BMP this evening and CBC and BMP tomorrow.        Acute cystitis without hematuria    Assessment: UC is growing out gram negative rods    Plan: continue with IV Rocephin as above and monitor for improvement and culture results.      Cellulitis of leg without foot    Assessment: Present on the left medial thigh, improving with decreased erythema and warmth compared to time of admission    Plan: continue with IV vancomycin as able, monitor blood culture results and treatment as above.      Atrial fibrillation with rapid ventricular response (H)    Assessment: Patient continues to be in atrial fibrillation with HR in the 100-110s however morning metoprolol was held secondary to blood pressures in the 80s systolic.  Following fluid bolus, blood pressures have stabilized in the  range systolic and HR still in the 110 range - occasionally increasing to the 120s.      Plan: Will continue treatment of  underlying infections as above.  Given low normal blood pressures and HR in the 100-110 range will continue to monitor for now.  If HR becomes consistently over 120, would re-evaluate - may be able to try very low dose metoprolol (12.5 mg x1 dosing) vs initiation of amiodarone vs digoxin if blood pressures still concerning.        Acute kidney injury (H)    Assessment: patient's creatinine was slightly elevated on admission (1.44 and baseline appears to be 1.2-1.3) however has increased further this morning despite fluid bolusing and ongoing maintenance IV fluids.      Plan: Will proceed with 500 cc bolus and continue IV fluids at 125 cc/hr.  Continue to monitor urinary output closely and will start strict I/Os.  Recheck creatinine q12 hours to ensure stability and hopeful improvement.        Chronic kidney disease, stage III (moderate)    Assessment: with acute worsening as above    Plan: Will proceed with treatment as above      Paroxysmal atrial fibrillation (H)    Assessment: with current exacerbation as above    Plan: continue with treatment as above      Lymphedema    Assessment: chronic and ongoing - may worsen secondary to IV fluids and need for recurrent bolusing    Plan: continue to monitor      Type 2 diabetes mellitus with other circulatory complications (H)    Assessment: blood sugars slightly elevated last night at time of admission but have improved into the 120-140 range today.  Hemoglobin A1C was 6.2 on 3/31/17 showing good control with home glipizide regimen    Plan: Continue with home regimen and cover with SSI as needed      Essential hypertension with goal blood pressure less than 140/90    Assessment: blood pressures low as above    Plan: will hold home Imdur, lasix and metoprolol and monitor blood pressures closely as above      Leukocytosis    Assessment: secondary to infection and sepsis as above.  WBC improving from 22.4 at time of admission down to 12.9 this morning     Plan: continue  treatment as above and continue to monitor.       Restless leg syndrome    Assessment: chronic and stable on Requip    Plan: Continue home regimen and monitor      Sleep apnea    Assessment: not using CPAP at home    Plan: continue to monitor and use O2 supplementation as needed      CAD - NSTEMI, CABG x 5 2007, angio in 2013 with patent grafts other than occluded graft to PL    Assessment: no symptoms concerning for angina    Plan: continue to monitor.  Hold Imdur and Toprol XL at this time as above      Long term current use of anticoagulant therapy    Assessment: on Coumadin with INR 5.13 this morning    Plan: pharmacy to assist in monitoring but will hold dose for today.  At this time, with no concerns for active bleeding, will not give Vitamin K       VTE:  Coumadin  Code Status:  Full Code        Interval History:   Patient's HR did improved with low dose PO metoprolol given overnight but blood pressures have now been in the 80s systolic with MAPS slightly less than 65 - did respond to 500 cc bolus given this morning and now in the  range systolically.  Patient continues to feel weak.  HR currently in the  range.  Patient afebrile overnight.  WBC improving.  Eating and voiding well.  Tolerating medications without significant side effects.  No new concerns today.            Significant Problems:     Past Medical History:   Diagnosis Date     Atrial fibrillation (H) 1/17/2007     Carpal tunnel syndrome      Coronary atherosclerosis of native coronary artery 2006    5 vessel CAD     OBSTRUCTIVE SLEEP APNEA     using CPAP     Osteoarthrosis, unspecified whether generalized or localized, unspecified site     generalized arthritis, particularly in her hands and feet         Other and combined forms of senile cataract 2000    Bilateral     Other and unspecified hyperlipidemia      RESTLESS LEGS SYNDROME      TENOSYNOV HAND/WRIST NEC 6/30/2006     Type II or unspecified type diabetes mellitus without  "mention of complication, not stated as uncontrolled 2001    Diabetes mellitus/pt is diet controlled with weight loss     Unspecified essential hypertension             Physical Exam:   Blood pressure 98/68, pulse 101, temperature 97.9  F (36.6  C), temperature source Oral, resp. rate 16, height 1.651 m (5' 5\"), weight 81.5 kg (179 lb 10.8 oz), SpO2 98 %.  Constitutional:   awake, alert, cooperative, no apparent distress, and appears stated age     Lungs:   No increased work of breathing, good air exchange, clear to auscultation bilaterally, no crackles or wheezing     Cardiovascular:   Irregularly irregular heart rate currently in the upper 110s     Abdomen:   Bowel sounds present, abdomen soft and non-tender     Musculoskeletal:   Patient has ongoing chronic lower leg swelling, does have ongoing erythema and warmth in the left medial thigh however is retracting away from lines of demarcation made in the ED.       Neurologic:   Awake, alert, oriented to name, place and time.       Skin:   Patient has ongoing chronic skin changes in bilateral lower legs.  Has ongoing erythema and warmth in the left medial thigh however is retracting from region of demarcation.  No other skin lesions present             Data:   All laboratory data reviewed    Attestation:  I have reviewed today's vital signs, notes, medications, labs and imaging.     Electronically Signed:  Briana Ball MD    Note: Chart documentation done in part with Dragon Voice Recognition software. Although reviewed after completion, some word and grammatical errors may remain.     "

## 2017-04-29 NOTE — PLAN OF CARE
Problem: Goal Outcome Summary  Goal: Goal Outcome Summary  Outcome: Improving  S. End of shift report     B. Sepsis     A. Patient afebrile, lungs clear, HR  's with one time dose or metoprolol given with results, BP 89/52 - 105/69 with MAPS greater than 65, RA maintaining sats above 95%. Patient is alert and oriented x4, up to the BR with 2 assist walker and gait belt and up sitting in the chairs for meals. UOP x 2 400cc's+  With 2 BM's. Tele  Is tachy with afib/flutter. ABX and fluids continue see MAR. Redness on LE's R and L is decreased and less warm to the touch than previous, left extremity has had mild weeping.      R. Will continue to monitor per POC.

## 2017-04-29 NOTE — PROGRESS NOTES
Pt remains in a fib/flutter with rates 's. Blood pressure continues to decrease. Cardizem decreased from 10mg/hr to 5mg/hr with no change. Discussed with MD and verbal order to shut Cardizem off for now and monitor in hopes that blood pressure will increase some without increasing heart rate too much. Will continue to closely monitor.

## 2017-04-30 PROBLEM — D72.829 LEUKOCYTOSIS: Status: RESOLVED | Noted: 2017-04-29 | Resolved: 2017-04-30

## 2017-04-30 PROBLEM — N39.0 UTI DUE TO KLEBSIELLA SPECIES: Status: ACTIVE | Noted: 2017-04-28

## 2017-04-30 PROBLEM — R79.1 SUPRATHERAPEUTIC INR: Status: ACTIVE | Noted: 2017-04-30

## 2017-04-30 PROBLEM — R65.20 SEVERE SEPSIS WITH ACUTE ORGAN DYSFUNCTION DUE TO GRAM NEGATIVE BACTERIA (H): Status: ACTIVE | Noted: 2017-04-28

## 2017-04-30 PROBLEM — E87.20 METABOLIC ACIDOSIS, NORMAL ANION GAP (NAG): Status: ACTIVE | Noted: 2017-04-30

## 2017-04-30 PROBLEM — A41.50 SEVERE SEPSIS WITH ACUTE ORGAN DYSFUNCTION DUE TO GRAM NEGATIVE BACTERIA (H): Status: ACTIVE | Noted: 2017-04-28

## 2017-04-30 PROBLEM — B96.89 UTI DUE TO KLEBSIELLA SPECIES: Status: ACTIVE | Noted: 2017-04-28

## 2017-04-30 LAB
ANION GAP SERPL CALCULATED.3IONS-SCNC: 10 MMOL/L (ref 3–14)
BUN SERPL-MCNC: 45 MG/DL (ref 7–30)
CALCIUM SERPL-MCNC: 7.2 MG/DL (ref 8.5–10.1)
CHLORIDE SERPL-SCNC: 121 MMOL/L (ref 94–109)
CO2 SERPL-SCNC: 13 MMOL/L (ref 20–32)
CREAT SERPL-MCNC: 1.82 MG/DL (ref 0.52–1.04)
ERYTHROCYTE [DISTWIDTH] IN BLOOD BY AUTOMATED COUNT: 19.2 % (ref 10–15)
GFR SERPL CREATININE-BSD FRML MDRD: 27 ML/MIN/1.7M2
GLUCOSE BLDC GLUCOMTR-MCNC: 120 MG/DL (ref 70–99)
GLUCOSE BLDC GLUCOMTR-MCNC: 212 MG/DL (ref 70–99)
GLUCOSE BLDC GLUCOMTR-MCNC: 61 MG/DL (ref 70–99)
GLUCOSE BLDC GLUCOMTR-MCNC: 95 MG/DL (ref 70–99)
GLUCOSE BLDC GLUCOMTR-MCNC: 97 MG/DL (ref 70–99)
GLUCOSE SERPL-MCNC: 74 MG/DL (ref 70–99)
HCT VFR BLD AUTO: 30.1 % (ref 35–47)
HGB BLD-MCNC: 8.7 G/DL (ref 11.7–15.7)
INR PPP: 4.64 (ref 0.86–1.14)
MCH RBC QN AUTO: 23.8 PG (ref 26.5–33)
MCHC RBC AUTO-ENTMCNC: 28.9 G/DL (ref 31.5–36.5)
MCV RBC AUTO: 82 FL (ref 78–100)
PLATELET # BLD AUTO: 246 10E9/L (ref 150–450)
POTASSIUM SERPL-SCNC: 4.6 MMOL/L (ref 3.4–5.3)
RBC # BLD AUTO: 3.66 10E12/L (ref 3.8–5.2)
SODIUM SERPL-SCNC: 144 MMOL/L (ref 133–144)
WBC # BLD AUTO: 8.3 10E9/L (ref 4–11)

## 2017-04-30 PROCEDURE — 25000132 ZZH RX MED GY IP 250 OP 250 PS 637: Mod: GY | Performed by: FAMILY MEDICINE

## 2017-04-30 PROCEDURE — 25000128 H RX IP 250 OP 636: Performed by: PEDIATRICS

## 2017-04-30 PROCEDURE — 00000146 ZZHCL STATISTIC GLUCOSE BY METER IP

## 2017-04-30 PROCEDURE — 25000128 H RX IP 250 OP 636: Performed by: EMERGENCY MEDICINE

## 2017-04-30 PROCEDURE — 80048 BASIC METABOLIC PNL TOTAL CA: CPT | Performed by: FAMILY MEDICINE

## 2017-04-30 PROCEDURE — 25000132 ZZH RX MED GY IP 250 OP 250 PS 637: Mod: GY | Performed by: EMERGENCY MEDICINE

## 2017-04-30 PROCEDURE — 20000003 ZZH R&B ICU

## 2017-04-30 PROCEDURE — 99233 SBSQ HOSP IP/OBS HIGH 50: CPT | Performed by: PEDIATRICS

## 2017-04-30 PROCEDURE — 25000131 ZZH RX MED GY IP 250 OP 636 PS 637: Mod: GY | Performed by: FAMILY MEDICINE

## 2017-04-30 PROCEDURE — 85610 PROTHROMBIN TIME: CPT | Performed by: FAMILY MEDICINE

## 2017-04-30 PROCEDURE — 27210995 ZZH RX 272: Performed by: FAMILY MEDICINE

## 2017-04-30 PROCEDURE — A9270 NON-COVERED ITEM OR SERVICE: HCPCS | Mod: GY | Performed by: EMERGENCY MEDICINE

## 2017-04-30 PROCEDURE — 25000125 ZZHC RX 250: Performed by: FAMILY MEDICINE

## 2017-04-30 PROCEDURE — A9270 NON-COVERED ITEM OR SERVICE: HCPCS | Mod: GY | Performed by: FAMILY MEDICINE

## 2017-04-30 PROCEDURE — 36415 COLL VENOUS BLD VENIPUNCTURE: CPT | Performed by: FAMILY MEDICINE

## 2017-04-30 PROCEDURE — 85027 COMPLETE CBC AUTOMATED: CPT | Performed by: FAMILY MEDICINE

## 2017-04-30 PROCEDURE — 25000128 H RX IP 250 OP 636: Performed by: FAMILY MEDICINE

## 2017-04-30 RX ORDER — CEFDINIR 300 MG/1
300 CAPSULE ORAL DAILY
Status: DISCONTINUED | OUTPATIENT
Start: 2017-05-01 | End: 2017-05-02 | Stop reason: HOSPADM

## 2017-04-30 RX ORDER — AMIODARONE HYDROCHLORIDE 200 MG/1
200 TABLET ORAL 2 TIMES DAILY
Status: DISCONTINUED | OUTPATIENT
Start: 2017-04-30 | End: 2017-05-02 | Stop reason: HOSPADM

## 2017-04-30 RX ORDER — FUROSEMIDE 10 MG/ML
40 INJECTION INTRAMUSCULAR; INTRAVENOUS ONCE
Status: COMPLETED | OUTPATIENT
Start: 2017-04-30 | End: 2017-04-30

## 2017-04-30 RX ORDER — HYDROCODONE BITARTRATE AND ACETAMINOPHEN 5; 325 MG/1; MG/1
1-2 TABLET ORAL EVERY 6 HOURS PRN
Status: DISCONTINUED | OUTPATIENT
Start: 2017-04-30 | End: 2017-05-02 | Stop reason: HOSPADM

## 2017-04-30 RX ADMIN — CEFTRIAXONE SODIUM 1 G: 1 INJECTION, POWDER, FOR SOLUTION INTRAMUSCULAR; INTRAVENOUS at 07:36

## 2017-04-30 RX ADMIN — ACETAMINOPHEN 650 MG: 325 TABLET ORAL at 04:11

## 2017-04-30 RX ADMIN — PRAMIPEXOLE DIHYDROCHLORIDE 1 MG: 0.5 TABLET ORAL at 21:31

## 2017-04-30 RX ADMIN — AMIODARONE HYDROCHLORIDE 0.5 MG/MIN: 50 INJECTION, SOLUTION INTRAVENOUS at 17:31

## 2017-04-30 RX ADMIN — GABAPENTIN 300 MG: 300 CAPSULE ORAL at 21:31

## 2017-04-30 RX ADMIN — AMIODARONE HYDROCHLORIDE 1 MG/MIN: 50 INJECTION, SOLUTION INTRAVENOUS at 09:53

## 2017-04-30 RX ADMIN — SODIUM CHLORIDE: 4.5 INJECTION, SOLUTION INTRAVENOUS at 05:50

## 2017-04-30 RX ADMIN — AMIODARONE HYDROCHLORIDE 150 MG: 1.5 INJECTION, SOLUTION INTRAVENOUS at 04:58

## 2017-04-30 RX ADMIN — INSULIN ASPART 1 UNITS: 100 INJECTION, SOLUTION INTRAVENOUS; SUBCUTANEOUS at 21:29

## 2017-04-30 RX ADMIN — GABAPENTIN 300 MG: 300 CAPSULE ORAL at 09:21

## 2017-04-30 RX ADMIN — HYDROCODONE BITARTRATE AND ACETAMINOPHEN 1 TABLET: 5; 325 TABLET ORAL at 00:03

## 2017-04-30 RX ADMIN — FUROSEMIDE 40 MG: 10 INJECTION, SOLUTION INTRAVENOUS at 09:21

## 2017-04-30 RX ADMIN — AMIODARONE HYDROCHLORIDE 1 MG/MIN: 50 INJECTION, SOLUTION INTRAVENOUS at 05:28

## 2017-04-30 ASSESSMENT — PAIN DESCRIPTION - DESCRIPTORS: DESCRIPTORS: ACHING

## 2017-04-30 NOTE — PROGRESS NOTES
S-(situation): Continuing fast heart rate.     B-(background): A-fib with RVR.     A-(assessment): Patient is presently asymptomatic with good oxygen saturation and blood pressure. Patient does not feel any difficulty in breathing or feel palpitations. Dr. Hartley notified of continued and increasing heart rate.     R-(recommendations): Continue to monitor and assess.

## 2017-04-30 NOTE — PROGRESS NOTES
[facility] Progress Note    Date of Service (when I saw the patient): 04/30/2017     Assessment & Plan   Kathryn Banks is a 77 year old female who was admitted on 4/28/2017.  Klebsiella pneumoniae UTI has been identified, and clinical course is consistent with severe sepsis with acute organ dysfunction including initially elevated lactic acid level and acute kidney injury due to UTI.  Signs of sepsis are improving although signs of kidney injury persist.  Rapid atrial fibrillation worsened overnight but is starting to improve after initiation of amiodarone.  She did not tolerate the amiodarone bolus initially because of a drop in blood pressure, but blood pressure has now stabilized and she is tolerating the lower dose amiodarone infusion without severe hypotension.  She has a persistent metabolic acidosis with normal anion gap probably due to acute kidney injury and possibly exacerbated by hyperchloremia from isotonic IV fluid administration for treatment of sepsis with hypotension.  INR supratherapeutic today, but she is not overtly bleeding.  Although there was initial concern for possible cellulitis in the thighs, this is thought less likely today.  So far, CAD has been stable.  Blood sugars have been well controlled and are actually trending toward hypoglycemia today potentially because of acute kidney injury that could exacerbate the duration and effect of previous oral glipizide therapy.      UTI due to Klebsiella species    Severe sepsis with acute organ dysfunction due to Gram negative bacteria (H)    Lymphedema    Anemia    Type 2 diabetes mellitus with other circulatory complications (H)    Acute kidney injury (H)    Atrial fibrillation with rapid ventricular response (H)    Cellulitis of leg without foot    Supratherapeutic INR    Metabolic acidosis, normal anion gap (NAG)    Restless leg syndrome    Sleep apnea    Chronic kidney  disease, stage III (moderate)    CAD - NSTEMI, CABG x 5 2007, angio in 2013 with patent grafts other than occluded graft to PL    Essential hypertension with goal blood pressure less than 140/90    Leukocytosis    Hold glipizide today  Continue IV amiodarone infusion as long as blood pressure remains stable  Discontinue vancomycin now that gram-negative infection has been identified and because of acute kidney injury, continue cephalosporin therapy for UTI  Follow urine output and renal function closely  Add low-dose diuretic due to hyperchloremia and elevated volume status  Follow acid base status  Hold warfarin today, discussed with pharmacy    Lines present: No invasive lines  DVT Prophylaxis: Warfarin  GI Prophylaxis: Not indicated  Procedures planned or completed today:  none  Restraints: Restraints for medical healing needed: NO    Family update by me today: No    Arash Silva MD    Time Spent on this Encounter   Billing:  I spent 40 minutes bedside and on the inpatient unit today managing the care of Kathryn Banks in relation to the issues listed in this note.    Interval History   General:  feels better but developed worsening rapid atrial fibrillation overnight with transient drop in blood pressure after a bolus of IV amiodarone    Vitals: no fever, increasing heart rate and falling blood pressure   Intake/Output: tolerating IV fluids, total intake is greater than output and decreased urine output   Nutrition: tolerating an advancing diet   Mental status: less confused and responding appropriately   Respiratory: respiratory effort about the same and oxigenation levels about the same   Cardiovascular no chest pain and increasing heart rate   Renal: stable renal fuction and decreasing urinary output   Neurologic: no focal deficits reported   Medications: amoidarone drip started overnight   Interventions: No interventions performed since the last visit   Consults: No consultations were requested since  the last visit        Medications     amiodarone (CORDARONE) infusion ADULT 1 mg/min (04/30/17 0528)     amiodarone (CORDARONE) infusion ADULT       NaCl 125 mL/hr at 04/30/17 0550     Warfarin Therapy Reminder         cefTRIAXone  1 g Intravenous Q24H     vancomycin (VANCOCIN) IV  1,250 mg Intravenous Q24H     gabapentin  300 mg Oral BID     glipiZIDE  10 mg Oral Daily with breakfast     nitroglycerin  0.4 mg Sublingual See Admin Instructions     pramipexole  1 mg Oral At Bedtime     insulin aspart  1-7 Units Subcutaneous TID AC     insulin aspart  1-5 Units Subcutaneous At Bedtime       Physical Exam   Temp: 98.3  F (36.8  C) Temp src: Oral Temp  Min: 97.8  F (36.6  C)  Max: 98.3  F (36.8  C) BP: 100/61 Pulse: 110 Heart Rate: 85 Resp: 19 SpO2: 97 % O2 Device: None (Room air) Oxygen Delivery: 1 LPM  Vitals:    04/28/17 1028 04/28/17 1720   Weight: 81.2 kg (179 lb 0.2 oz) 81.5 kg (179 lb 10.8 oz)     I/O last 3 completed shifts:  In: 3070 [P.O.:2020; I.V.:1050]  Out: 400 [Urine:400]    Resp: 19  BP - Mean:  [] 76     Constitutional: no acute distress resting in bed  Neurologic: alert and maintains wakefulness and attention, seems somewhat confused but is able to follow instructions reliably, normal speech  Cardiovascular: tachycardic with irregularly irregular rhythm, good radial pulse with normal capillary refill  Respiratory: normal respiratory effort, clear lungs  GI: soft abdomen without tenderness  Skin: blanching erythema present over the medial aspect of both thighs without warmth or tenderness  Extremities: pitting edema present in the lower extremities bilaterally including the thighs  Lines: No invasive lines      Data   Data reviewed today:  I personally reviewed telemetry demonstrating atrial fibrillation.    Recent Labs  Lab 04/30/17  0536 04/29/17  1757 04/29/17  0537 04/28/17  1050   WBC 8.3  --  12.9* 22.4*   HGB 8.7*  --  9.0* 10.7*   MCV 82  --  83 82     --  246 283   INR 4.64*  --   5.13* 3.07*    145* 145* 143   POTASSIUM 4.6 5.6* 4.8 4.5   CHLORIDE 121* 120* 121* 117*   CO2 13* 14* 15* 16*   BUN 45* 45* 40* 35*   CR 1.82* 1.92* 1.77* 1.44*   ANIONGAP 10 11 9 10   MALICK 7.2* 7.5* 7.2* 7.9*   GLC 74 172* 133* 190*   ALBUMIN  --   --   --  2.6*   PROTTOTAL  --   --   --  5.9*   BILITOTAL  --   --   --  0.3   ALKPHOS  --   --   --  242*   ALT  --   --   --  23   AST  --   --   --  13   TROPI  --   --   --  0.026     Urine culture grew Klebsiella pneumoniae  Blood cultures remain negative at 2 days  Nasal culture for MRSA was negative

## 2017-04-30 NOTE — PLAN OF CARE
Problem: Goal Outcome Summary  Goal: Goal Outcome Summary  Outcome: No Change  Heart rate has been increasing since midnight from the 90's up to 130's-140's until an amiodarone bolus was administered at approximately 0500 over 10 minutes and continued with a drip of 1 mg/min. Blood pressures have come down to the 80's/60's with MAPs of >65. Heart rate is now in the 90's to 100's. Patient has been fairly comfortable during the night except for some pain, sometimes severe, in her left foot, lower leg and knee. Norco one tab was given with partial relief and was able to sleep off and on. Taking oral fluids well but urine output continues to be low. Lungs clear and diminished with oxygen saturation in 90's at room air. No changes in skin condition.

## 2017-04-30 NOTE — CONSULTS
Care Transition Initial Assessment -   Reason For Consult: discharge planning  Met with: Patient    Principal Problem:    UTI due to Klebsiella species  Active Problems:    Restless leg syndrome    Sleep apnea    Chronic kidney disease, stage III (moderate)    CAD - NSTEMI, CABG x 5 2007, angio in 2013 with patent grafts other than occluded graft to PL    Lymphedema    Anemia    Type 2 diabetes mellitus with other circulatory complications (H)    Acute kidney injury (H)    Essential hypertension with goal blood pressure less than 140/90    Atrial fibrillation with rapid ventricular response (H)    Severe sepsis with acute organ dysfunction due to Gram negative bacteria (H)    Cellulitis of leg without foot    Supratherapeutic INR    Metabolic acidosis, normal anion gap (NAG)         DATA  Lives With: alone  Living Arrangements: house  Description of Support System: Involved, Supportive  Who is your support system?: Children  Support Assessment: Adequate family and caregiver support  Identified issues/concerns regarding health management:          ASSESSMENT  Cognitive Status:  awake, alert and oriented  Concerns to be addressed: Patient remains in ICU.  Patient reports feeling weak.  P/T to start working with patient tomorrow.  Currently patient has [facility].  She may need TCU.  Patient states her  is at [facility] (Admissions: [phone] Main Phone: [phone] Fax: [phone]) She would like referral sent there so she could be by her .  Referral sent.     PLAN  Financial costs for the patient includes Private room fee [facility] - this was explained to patient   Patient given options and choices for discharge:  Yes  Patient/family is agreeable to the plan?  Yes  Patient Goals and Preferences: Return home if able  Patient anticipates discharging to:  [facility]  [facility] [phone]/ [facility] [phone]

## 2017-04-30 NOTE — PHARMACY-ANTICOAGULATION SERVICE
Clinical Pharmacy - Warfarin Dosing Consult     Pharmacy has been consulted to manage this patient s warfarin therapy.  Indication: Atrial Fibrillation  Therapy Goal: INR 2-3  Warfarin Prior to Admission: Yes  Warfarin PTA Regimen: 7.5mg Fri, 5mg all other days of the week  Significant drug interactions: APAP, ceftriaxone, norco, vancomycin  Recent documented change in oral intake/nutrition: Unknown    INR   Date Value Ref Range Status   04/30/2017 4.64 (H) 0.86 - 1.14 Final   04/29/2017 5.13 (HH) 0.86 - 1.14 Final     Comment:     Critical Value called to and read back by  SONAL NAGY IN MED SURG AT 0610 ON 4/29/17 DC         Recommend warfarin ZERO mg today.  Order placed for NO warfarin today.    Pharmacy will monitor Kathryn MICKY Rowlandgabriela daily and order warfarin doses to achieve specified goal.      Please contact pharmacy as soon as possible if the warfarin needs to be held for a procedure or if the warfarin goals change.      Clay Carrillo, PharmD

## 2017-04-30 NOTE — PROGRESS NOTES
S-(situation): hypoglycemia    B-(background): sepsis    A-(assessment): Patient BS 61, asymptomatic,     R-(recommendations): patient refusing gel would like grape juice; writer notified provider DC'd glypizide, grape juice given and recheck BS is 95.

## 2017-05-01 ENCOUNTER — APPOINTMENT (OUTPATIENT)
Dept: PHYSICAL THERAPY | Facility: CLINIC | Age: 77
DRG: 871 | End: 2017-05-01
Payer: MEDICARE

## 2017-05-01 PROBLEM — G93.40 ACUTE ENCEPHALOPATHY: Status: ACTIVE | Noted: 2017-05-01

## 2017-05-01 PROBLEM — R21 RASH: Status: ACTIVE | Noted: 2017-04-29

## 2017-05-01 LAB
ANION GAP SERPL CALCULATED.3IONS-SCNC: 12 MMOL/L (ref 3–14)
BASE DEFICIT BLDV-SCNC: 14.5 MMOL/L
BUN SERPL-MCNC: 48 MG/DL (ref 7–30)
CALCIUM SERPL-MCNC: 7.2 MG/DL (ref 8.5–10.1)
CHLORIDE SERPL-SCNC: 122 MMOL/L (ref 94–109)
CO2 SERPL-SCNC: 12 MMOL/L (ref 20–32)
CREAT SERPL-MCNC: 1.93 MG/DL (ref 0.52–1.04)
GFR SERPL CREATININE-BSD FRML MDRD: 25 ML/MIN/1.7M2
GLUCOSE BLDC GLUCOMTR-MCNC: 116 MG/DL (ref 70–99)
GLUCOSE BLDC GLUCOMTR-MCNC: 139 MG/DL (ref 70–99)
GLUCOSE BLDC GLUCOMTR-MCNC: 172 MG/DL (ref 70–99)
GLUCOSE BLDC GLUCOMTR-MCNC: 195 MG/DL (ref 70–99)
GLUCOSE SERPL-MCNC: 174 MG/DL (ref 70–99)
HCO3 BLDV-SCNC: 12 MMOL/L (ref 21–28)
HGB BLD-MCNC: 8.8 G/DL (ref 11.7–15.7)
INR PPP: 3.41 (ref 0.86–1.14)
LACTATE BLD-SCNC: 0.6 MMOL/L (ref 0.7–2.1)
MAGNESIUM SERPL-MCNC: 2.2 MG/DL (ref 1.6–2.3)
O2/TOTAL GAS SETTING VFR VENT: 21 %
PCO2 BLDV: 29 MM HG (ref 40–50)
PH BLDV: 7.22 PH (ref 7.32–7.43)
PO2 BLDV: 55 MM HG (ref 25–47)
POTASSIUM SERPL-SCNC: 4.7 MMOL/L (ref 3.4–5.3)
SODIUM SERPL-SCNC: 146 MMOL/L (ref 133–144)

## 2017-05-01 PROCEDURE — 85610 PROTHROMBIN TIME: CPT | Performed by: FAMILY MEDICINE

## 2017-05-01 PROCEDURE — 36415 COLL VENOUS BLD VENIPUNCTURE: CPT | Performed by: FAMILY MEDICINE

## 2017-05-01 PROCEDURE — 25000125 ZZHC RX 250: Performed by: FAMILY MEDICINE

## 2017-05-01 PROCEDURE — 85018 HEMOGLOBIN: CPT | Performed by: FAMILY MEDICINE

## 2017-05-01 PROCEDURE — 99233 SBSQ HOSP IP/OBS HIGH 50: CPT | Performed by: PEDIATRICS

## 2017-05-01 PROCEDURE — 25000132 ZZH RX MED GY IP 250 OP 250 PS 637: Mod: GY | Performed by: PEDIATRICS

## 2017-05-01 PROCEDURE — A9270 NON-COVERED ITEM OR SERVICE: HCPCS | Mod: GY | Performed by: PEDIATRICS

## 2017-05-01 PROCEDURE — 82803 BLOOD GASES ANY COMBINATION: CPT | Performed by: PEDIATRICS

## 2017-05-01 PROCEDURE — 97530 THERAPEUTIC ACTIVITIES: CPT | Mod: GP | Performed by: PHYSICAL THERAPIST

## 2017-05-01 PROCEDURE — 00000146 ZZHCL STATISTIC GLUCOSE BY METER IP

## 2017-05-01 PROCEDURE — 12000007 ZZH R&B INTERMEDIATE

## 2017-05-01 PROCEDURE — 83735 ASSAY OF MAGNESIUM: CPT | Performed by: FAMILY MEDICINE

## 2017-05-01 PROCEDURE — 83605 ASSAY OF LACTIC ACID: CPT | Performed by: FAMILY MEDICINE

## 2017-05-01 PROCEDURE — 25000132 ZZH RX MED GY IP 250 OP 250 PS 637: Mod: GY | Performed by: FAMILY MEDICINE

## 2017-05-01 PROCEDURE — 97162 PT EVAL MOD COMPLEX 30 MIN: CPT | Mod: GP | Performed by: PHYSICAL THERAPIST

## 2017-05-01 PROCEDURE — 80048 BASIC METABOLIC PNL TOTAL CA: CPT | Performed by: FAMILY MEDICINE

## 2017-05-01 PROCEDURE — A9270 NON-COVERED ITEM OR SERVICE: HCPCS | Mod: GY | Performed by: INTERNAL MEDICINE

## 2017-05-01 PROCEDURE — 40000193 ZZH STATISTIC PT WARD VISIT: Performed by: PHYSICAL THERAPIST

## 2017-05-01 PROCEDURE — A9270 NON-COVERED ITEM OR SERVICE: HCPCS | Mod: GY | Performed by: FAMILY MEDICINE

## 2017-05-01 PROCEDURE — 25000132 ZZH RX MED GY IP 250 OP 250 PS 637: Mod: GY | Performed by: INTERNAL MEDICINE

## 2017-05-01 RX ORDER — SIMVASTATIN 40 MG
40 TABLET ORAL AT BEDTIME
Status: DISCONTINUED | OUTPATIENT
Start: 2017-05-01 | End: 2017-05-01

## 2017-05-01 RX ORDER — SODIUM BICARBONATE 650 MG/1
1300 TABLET ORAL 4 TIMES DAILY
Status: DISCONTINUED | OUTPATIENT
Start: 2017-05-01 | End: 2017-05-02 | Stop reason: HOSPADM

## 2017-05-01 RX ORDER — FUROSEMIDE 40 MG
40 TABLET ORAL DAILY
Status: DISCONTINUED | OUTPATIENT
Start: 2017-05-01 | End: 2017-05-02 | Stop reason: HOSPADM

## 2017-05-01 RX ORDER — SIMVASTATIN 20 MG
20 TABLET ORAL AT BEDTIME
Status: DISCONTINUED | OUTPATIENT
Start: 2017-05-01 | End: 2017-05-02 | Stop reason: HOSPADM

## 2017-05-01 RX ORDER — METOPROLOL SUCCINATE 25 MG/1
25 TABLET, EXTENDED RELEASE ORAL DAILY
Status: DISCONTINUED | OUTPATIENT
Start: 2017-05-01 | End: 2017-05-02

## 2017-05-01 RX ORDER — GABAPENTIN 300 MG/1
300 CAPSULE ORAL AT BEDTIME
Status: DISCONTINUED | OUTPATIENT
Start: 2017-05-01 | End: 2017-05-02 | Stop reason: HOSPADM

## 2017-05-01 RX ORDER — SODIUM BICARBONATE 650 MG/1
650 TABLET ORAL 4 TIMES DAILY
Status: DISCONTINUED | OUTPATIENT
Start: 2017-05-01 | End: 2017-05-01

## 2017-05-01 RX ORDER — FERROUS SULFATE 325(65) MG
325 TABLET ORAL
Status: DISCONTINUED | OUTPATIENT
Start: 2017-05-02 | End: 2017-05-02 | Stop reason: HOSPADM

## 2017-05-01 RX ORDER — ISOSORBIDE MONONITRATE 30 MG/1
30 TABLET, EXTENDED RELEASE ORAL DAILY
Status: DISCONTINUED | OUTPATIENT
Start: 2017-05-01 | End: 2017-05-02 | Stop reason: HOSPADM

## 2017-05-01 RX ADMIN — SIMVASTATIN 20 MG: 20 TABLET, FILM COATED ORAL at 22:16

## 2017-05-01 RX ADMIN — AMIODARONE HYDROCHLORIDE 0.5 MG/MIN: 50 INJECTION, SOLUTION INTRAVENOUS at 01:46

## 2017-05-01 RX ADMIN — SODIUM BICARBONATE 650 MG TABLET 1300 MG: at 08:44

## 2017-05-01 RX ADMIN — FUROSEMIDE 40 MG: 40 TABLET ORAL at 12:29

## 2017-05-01 RX ADMIN — AMIODARONE HYDROCHLORIDE 200 MG: 200 TABLET ORAL at 00:21

## 2017-05-01 RX ADMIN — GABAPENTIN 300 MG: 300 CAPSULE ORAL at 08:43

## 2017-05-01 RX ADMIN — AMIODARONE HYDROCHLORIDE 200 MG: 200 TABLET ORAL at 08:44

## 2017-05-01 RX ADMIN — PRAMIPEXOLE DIHYDROCHLORIDE 1 MG: 0.5 TABLET ORAL at 22:16

## 2017-05-01 RX ADMIN — SODIUM BICARBONATE 650 MG TABLET 1300 MG: at 12:29

## 2017-05-01 RX ADMIN — SODIUM BICARBONATE 650 MG TABLET 1300 MG: at 22:18

## 2017-05-01 RX ADMIN — SODIUM BICARBONATE 650 MG TABLET 1300 MG: at 16:08

## 2017-05-01 RX ADMIN — ISOSORBIDE MONONITRATE 30 MG: 30 TABLET, EXTENDED RELEASE ORAL at 12:29

## 2017-05-01 RX ADMIN — METOPROLOL SUCCINATE 25 MG: 25 TABLET, EXTENDED RELEASE ORAL at 12:29

## 2017-05-01 RX ADMIN — GABAPENTIN 300 MG: 300 CAPSULE ORAL at 22:16

## 2017-05-01 RX ADMIN — AMIODARONE HYDROCHLORIDE 200 MG: 200 TABLET ORAL at 22:16

## 2017-05-01 RX ADMIN — CEFDINIR 300 MG: 300 CAPSULE ORAL at 08:44

## 2017-05-01 NOTE — PROGRESS NOTES
SPIRITUAL HEALTH SERVICES  SPIRITUAL ASSESSMENT Progress Note  [facility]      Pt request - (Pt known to  from previous hospitalization.)  Pt was sitting in a chair and reported she was excited that she would be going to the Med/Surg floor today.   provided a prayer and communion to pt.   is available for pt/family needs.    Mauricio Hernandez M.Div., [facility]  Staff   Office tel: [phone]

## 2017-05-01 NOTE — PHARMACY-ANTICOAGULATION SERVICE
Clinical Pharmacy - Warfarin Dosing Consult     Pharmacy has been consulted to manage this patient s warfarin therapy.  Indication: Atrial Fibrillation  Therapy Goal: INR 2-3  Warfarin Prior to Admission: Yes  Warfarin PTA Regimen: 7.5mg Fri, 5mg all other days of the week  Significant drug interactions: acetaminophen, amiodarone, cefdinir, hydrocodone/acetaminophen  Recent documented change in oral intake/nutrition: Unknown    INR   Date Value Ref Range Status   05/01/2017 3.41 (H) 0.86 - 1.14 Final   04/30/2017 4.64 (H) 0.86 - 1.14 Final       Recommend HOLDING warfarin today, due to elevated INR.  Pharmacy will monitor Kathryn Banks daily and order warfarin doses to achieve specified goal.      Please contact pharmacy as soon as possible if the warfarin needs to be held for a procedure or if the warfarin goals change.

## 2017-05-01 NOTE — PROGRESS NOTES
[facility]    Hospitalist Progress Note    Date of Service (when I saw the patient): 05/01/2017    Assessment & Plan   Kathryn Banks is a 77 year old female who was admitted on 4/28/2017 with sepsis due to Klebsiella UTI which was also associated with rapid atrial fibrillation, hypotension and acute kidney injury.  She continues to gradually improve.    Principal Problem:    UTI due to Klebsiella species    Assessment: Klebsiella pneumoniae isolated on urine culture, continuing to improve after transition to oral Cefdinir today    Plan: Continue antibiotics to complete a course of treatment    Active Problems:    Severe sepsis with acute organ dysfunction due to Gram negative bacteria (H)    Assessment: Fever, leukocytosis, tachycardia, hypotension, elevated lactic acid level, acute kidney injury, and acute encephalopathy are all consistent with severe sepsis with acute organ dysfunction, has improved with antibiotics and IV fluid therapy and did not require vasopressors but transiently deteriorated after attempted IV diltiazem therapy of rapid atrial fibrillation, signs of sepsis are gradually improving    Plan: IV fluids discontinued, continue oral antibiotic to complete a course of treatment, advance diet and activity, PT evaluation to assist with disposition planning      Lymphedema    Assessment: Chronic and stable, probably contributed to the redness noted in the skin of the thighs at admission    Plan: Continue chronic treatments      Anemia    Assessment: Chronic and stable, normally taking iron    Plan: Resume iron therapy at discharge      Type 2 diabetes mellitus with other circulatory complications (H)    Assessment: Adequate glycemic control, blood sugars trended downward as she continued glipizide initially probably due to acute kidney injury with decreased renal clearance of glipizide, blood sugars remained stable after discontinuation of glipizide yesterday because  of concern for hypoglycemia    Plan: Follow blood sugars, continue NovoLog correction scale and titrate insulin as needed to optimize glycemic control, would not restart glipizide until renal function recovers      Acute kidney injury (H)    Assessment: Creatinine persistently 1.7-1.9 which is higher than her usual baseline of 1.4-1.5 and consistent with acute kidney injury, also had oliguria and transient hyperkalemia both of which have subsided with loop diuretic administration, continues to have metabolic acidosis with normal anion gap probably due to acute kidney injury and lack of bicarbonate regeneration and metabolic acidosis appears to be worsening    Plan: Start bicarbonate replacement today and follow acid base status closely, continue to follow renal function and urine output closely, adjust medications including gabapentin dosing for renal dysfunction      Atrial fibrillation with rapid ventricular response (H)    Assessment: Likely precipitated by sepsis although she has atrial fibrillation chronically, rate has been difficult to control in the context of sepsis and low blood pressure but hypotension is most likely due to sepsis rather than atrial fibrillation, clinically improved with starting new amiodarone therapy although heart rate is trending upward today after discontinuation of IV amiodarone infusion but she has not yet restarted her beta blocker and had been taking Toprol- mg daily previously    Plan: Continue oral amiodarone, restart low dose beta blocker and titrate upward as blood pressure tolerates to optimize heart rate control, taking warfarin chronically      Rash - redness medial thighs    Assessment: Initial concern for possible cellulitis but clinical course has not been concerning for cellulitis, had clinical history of sitting on her toilet and falling asleep in the hours prior to admission which may account for the redness noted on her thighs    Plan: Follow clinically       Supratherapeutic INR    Assessment: Due to a combination of antibiotics and amiodarone combined with chronic warfarin therapy    Plan: Pharmacy managing warfarin dosing, anticipate no warfarin today, follow INR closely      Metabolic acidosis, normal anion gap (NAG)    Assessment: Worsening today presumed due to worsening renal function    Plan: Start oral bicarbonate replacement and follow acid base status closely      Acute encephalopathy    Assessment: Presented at admission with clinical history of falling asleep on the toilet and confusion and disorientation at home noted by her home physical therapist as well as EMS consistent with an initial acute encephalopathy, probably due to sepsis, has not had focal neurologic deficits or seizures    Plan: Continue symptomatic treatment      Restless leg syndrome    Assessment: Chronic and stable    Plan: No change      Sleep apnea    Assessment: Chronic    Plan: No change      Chronic kidney disease, stage III (moderate)    Assessment: Worsening renal function from baseline    Plan: As noted above      CAD - NSTEMI, CABG x 5 2007, angio in 2013 with patent grafts other than occluded graft to PL    Assessment: Chronic and stable without angina during this hospital stay    Plan: Continue chronic medications      Essential hypertension with goal blood pressure less than 140/90    Assessment: Low blood pressure readings during this hospital stay    Plan: As summarized above    DVT Prophylaxis: Warfarin  Code Status: Full Code    Disposition: Expected discharge in 1-3 days once rate of atrial fibrillation, renal function and metabolic acidosis stabilize, probable discharge to skilled nursing facility for anticipated short-term rehabilitation.    Arash Silva MD    Interval History   She feels better today although remains weak.  She feels a bit dizzy when she is up.  She denies any chest pain or shortness of breath.  She has not had fever.  Heart rate is trended upward  after IV amiodarone was discontinued late yesterday.  She was started on oral amiodarone overnight.  Blood pressure has fluctuated with intermittent but not persistent low blood pressure readings.  Oxygenation has been stable.  She is tolerating good oral intake.  Urine output has been adequate.  She has been moving her bowels.  She has been able to ambulate with assistance using a walker.  She expresses intent to discharge to a skilled nursing facility for rehabilitation as advised by PT and reports that her spouse is currently at [facility] so would be her preference to discharge to that facility for rehabilitation if available.    Additional history is obtained from the patient.  She remembers falling asleep while sitting on the toilet at home although she is not sure why she fell asleep.  She does not remember being confused at home although EMS report alludes to confusion.  She remembers her home physical therapist found her with decreased responsiveness at home and had called for help.    -Data reviewed today: I reviewed all new labs and imaging results over the last 24 hours. I personally reviewed telemetry which demonstrates persistent atrial fibrillation but no other dysrhythmias.    Physical Exam   Temp: 97.8  F (36.6  C) Temp src: Oral BP: 109/82   Heart Rate: 112 Resp: 14 SpO2: 98 % O2 Device: None (Room air)    Vitals:    04/28/17 1720 04/30/17 1200 05/01/17 0529   Weight: 81.5 kg (179 lb 10.8 oz) 88.5 kg (195 lb 1.7 oz) 79.5 kg (175 lb 4.3 oz)     Vital Signs with Ranges  Temp:  [97.5  F (36.4  C)-97.8  F (36.6  C)] 97.8  F (36.6  C)  Heart Rate:  [] 112  Resp:  [10-38] 14  BP: ()/() 109/82  SpO2:  [93 %-98 %] 98 %  I/O last 3 completed shifts:  In: 2879 [P.O.:1520; I.V.:1359]  Out: 1700 [Urine:1700]    Constitutional: No acute distress sitting in a chair  Respiratory: Normal respiratory effort, diminished breath sounds at the bases, few crackles, no  wheezes  Cardiovascular: Irregularly irregular tachycardic heart rate and rhythm, good radial pulse with normal capillary refill  Neuro: Alert and maintains wakefulness and attention, no overt confusion although reliability of her memory for the events on the day of hospital admission is questionable    Medications     Warfarin Therapy Reminder         sodium bicarbonate  1,300 mg Oral 4x Daily     cefdinir  300 mg Oral Daily     amiodarone  200 mg Oral BID     gabapentin  300 mg Oral BID     nitroglycerin  0.4 mg Sublingual See Admin Instructions     pramipexole  1 mg Oral At Bedtime     insulin aspart  1-7 Units Subcutaneous TID AC     insulin aspart  1-5 Units Subcutaneous At Bedtime       Data   Data reviewed today:  I personally reviewed telemetry.    Recent Labs  Lab 05/01/17  0518 04/30/17  0536 04/29/17  1757 04/29/17  0537 04/28/17  1050   WBC  --  8.3  --  12.9* 22.4*   HGB 8.8* 8.7*  --  9.0* 10.7*   MCV  --  82  --  83 82   PLT  --  246  --  246 283   INR 3.41* 4.64*  --  5.13* 3.07*   * 144 145* 145* 143   POTASSIUM 4.7 4.6 5.6* 4.8 4.5   CHLORIDE 122* 121* 120* 121* 117*   CO2 12* 13* 14* 15* 16*   BUN 48* 45* 45* 40* 35*   CR 1.93* 1.82* 1.92* 1.77* 1.44*   ANIONGAP 12 10 11 9 10   MALICK 7.2* 7.2* 7.5* 7.2* 7.9*   * 74 172* 133* 190*   ALBUMIN  --   --   --   --  2.6*   PROTTOTAL  --   --   --   --  5.9*   BILITOTAL  --   --   --   --  0.3   ALKPHOS  --   --   --   --  242*   ALT  --   --   --   --  23   AST  --   --   --   --  13   TROPI  --   --   --   --  0.026     Blood cultures remain negative to date  Nasal culture for MRSA was negative  Lactic acid 0.6  Blood sugars have ranged  over the last 24 hours  Venous blood gas pH 7.22, PCO2 29, bicarbonate 12 today

## 2017-05-01 NOTE — PLAN OF CARE
Problem: Goal Outcome Summary  Goal: Goal Outcome Summary  Outcome: Improving  Afebrile, HR , -130/50-80's. RR in 30's at [facility] and flagged for lactic which was 0.6 and has had RR in 20's since.  Pt on RA, in 90's.  Pt ate sack lunch at [facility] and finished around 2:30.  Up to commode and voided 500cc.  Redness on thigh is lighter in color.  Assisted by one when up to commode, uses walker with ques.

## 2017-05-01 NOTE — PROGRESS NOTES
Patient accepted at [facility] (Admissions: [phone] Main Phone: [phone] Fax: [phone]).  Patient is aware of the private room fee.    BON Simms  [facility] [phone]/ [facility] [phone]

## 2017-05-01 NOTE — PLAN OF CARE
Problem: Goal Outcome Summary  Goal: Goal Outcome Summary  Patient on amiodarone drip currently at 0.5mg/hr Bp's tolerating this and HR is decreased 's, Lungs are clear. BS+ LE 's are Red and kailash with decreasing redness within the markings. Scab has fallen off of left Xavier the calf front of leg/RASHIDA. Good appetite and up to the chair and BR with 1 assist walker and gait belt.          Problem: Arrhythmia/Dysrhythmia (Symptomatic) (Adult)  Goal: Signs and Symptoms of Listed Potential Problems Will be Absent or Manageable (Arrhythmia/Dysrhythmia)  Signs and symptoms of listed potential problems will be absent or manageable by discharge/transition of care (reference Arrhythmia/Dysrhythmia (Symptomatic) (Adult) CPG).   Outcome: Improving  Patient tolerating amiodarone drip with BP's HR  's,

## 2017-05-01 NOTE — PROGRESS NOTES
05/01/17 1000   Quick Adds   Type of Visit Initial PT Evaluation   Living Environment   Lives With alone   Living Arrangements house   Home Accessibility tub/shower is not walk in   Number of Stairs to Enter Home 0   Number of Stairs Within Home 0   Transportation Available family or friend will provide   Living Environment Comment Patients  has Parkinsons disease and is now at [facility] since 2 week, for Short term rehab following a hospital stay.   Self-Care   Usual Activity Tolerance fair   Current Activity Tolerance fair   Regular Exercise yes   Activity/Exercise Type strength training;walking   Equipment Currently Used at Home raised toilet;walker, rolling;wheelchair;shower chair   Activity/Exercise/Self-Care Comment Home PT three times per week, does HEP every day otherwise supine exercise program   Functional Level Prior   Ambulation 1-->assistive equipment   Transferring 1-->assistive equipment   Toileting 1-->assistive equipment   Bathing 0-->independent   Dressing 0-->independent   Eating 0-->independent   Communication 0-->understands/communicates without difficulty   Swallowing 0-->swallows foods/liquids without difficulty   Cognition 0 - no cognition issues reported   Fall history within last six months yes   Number of times patient has fallen within last six months 1   Which of the above functional risks had a recent onset or change? fall history   Prior Functional Level Comment Weaker than her normal self per report   General Information   Onset of Illness/Injury or Date of Surgery - Date 04/28/17   Referring Physician Dr Silva   Patient/Family Goals Statement improve activity tolerance   Pertinent History of Current Problem (include personal factors and/or comorbidities that impact the POC) Kathryn Banks is a 77 year old female who was admitted on 4/28/2017. Klebsiella pneumoniae UTI has been identified,with sepsis and organ dysfunction this admission  noted. Also history of falls with L hip fracture and ORIF, having some difficulty healing this overall and was seeking towards possible SADIA vs hemiarthroplasty. She also has type 2 diabetes, kidney disease, + cardiac history, sleep apnea and hypertension. She was found on her toilet by EMS after her home PT could not enter her home, and she didnt answer the door. She did note home PT was coming after her hip surgery from February, in which she had her previous hardware exchanged from her previous ORIF.    Precautions/Limitations fall precautions   Weight-Bearing Status - LUE weight-bearing as tolerated   Weight-Bearing Status - RUE weight-bearing as tolerated   Weight-Bearing Status - LLE weight-bearing as tolerated   Weight-Bearing Status - RLE weight-bearing as tolerated   General Observations  at rest in chair on telemetry.    General Info Comments Was driving, walking in the home with walker (2WW)   Cognitive Status Examination   Orientation orientation to person, place and time   Level of Consciousness alert   Follows Commands and Answers Questions able to follow multistep instructions;able to follow single-step instructions;100% of the time   Cognitive Comment States she is a bit foggy minded with this infection.    Pain Assessment   Patient Currently in Pain No   Integumentary/Edema   Integumentary/Edema Comments Edema present in reagan LE   Posture    Posture Comments Min thoracic kyphosis.    Range of Motion (ROM)   ROM Comment Reduced L hip ROM s/p L hip surgery in February and last fall. B UE ROM WFL. R LE WFL. B ankle DF tight   Strength   Strength Comments LE MMT: 4/5 R hip flexion, 3-/5 L hip s/p surgery, R knee ext and knee flexion 5/5, 3+/5 L quad and knee pain, 4/5 HS. Ankle DF L 5/5, R 5/5.  Iso adduction R 4+/5, 4/5 L, abduction 4+/5 R, 4/5 L   Bed Mobility   Bed Mobility Comments Not assessed today but pt reports no major impairments, SBA with nursing   Transfer Skills   Transfer Comments Sit  to stand and stand to sit with CGA. No verbal cues required for hand placement. Pt kicks out L LE independently to protect hip.    Gait   Gait Comments Pt ambulates with FWW and CGA. Ambulates 50 ft with one episode of dizziness requiring pt to sit down. HR remained around 130 bpm throughout ambulation. Pt showing decreased stance time on L LE with shorter step length on R. Pt leads with toe contact rather than heel strike on L.    Balance   Balance Comments No obvious balance impairment. Pt reports dizziness with her feeling as though she was spinning during ambulation requiring a seated break for recovery.   Sensory Examination   Sensory Perception no deficits were identified   Coordination   Coordination no deficits were identified   Muscle Tone   Muscle Tone no deficits were identified   General Therapy Interventions   Planned Therapy Interventions balance training;bed mobility training;ROM;strengthening;stretching;transfer training;gait training;neuromuscular re-education   Clinical Impression   Criteria for Skilled Therapeutic Intervention yes, treatment indicated   PT Diagnosis Reduced activity tolerance, strength and functional mobility with UTI, previous Hip surgeries, reduced balance   Influenced by the following impairments Strength, mobility exam, balance   Functional limitations due to impairments Gait, stairs, transfers   Clinical Presentation Evolving/Changing   Clinical Presentation Rationale Medical Cormorbidities, level of function pre admission, anxiety   Clinical Decision Making (Complexity) Moderate complexity   Therapy Frequency` daily   Predicted Duration of Therapy Intervention (days/wks) 3 days   Anticipated Equipment Needs at Discharge (Defer to TCU)   Anticipated Discharge Disposition Transitional Care Facility   Risk & Benefits of therapy have been explained Yes   Patient, Family & other staff in agreement with plan of care Yes   Clinical Impression Comments Pt presents with L hip  "weakenss and mild pain. This impairs her gait pattern and balance which makes her unsafe to ambulate safely without assistance. Pt would benefit from skilled therapy to address balance impairment, gait impairments and strength deficits with acute illness on top of some more chronic and orthopedic issues in recent year. Barriers to home DC including living alone currently while  is in TCU   [facility] TM \"6 Clicks\"   2016, Trustees of [facility], under license to [employer].  All rights reserved.   6 Clicks Short Forms Basic Mobility Inpatient Short Form   [facility]  \"6 Clicks\" V.2 Basic Mobility Inpatient Short Form   1. Turning from your back to your side while in a flat bed without using bedrails? 3 - A Little   2. Moving from lying on your back to sitting on the side of a flat bed without using bedrails? 3 - A Little   3. Moving to and from a bed to a chair (including a wheelchair)? 3 - A Little   4. Standing up from a chair using your arms (e.g., wheelchair, or bedside chair)? 3 - A Little   5. To walk in hospital room? 3 - A Little   6. Climbing 3-5 steps with a railing? 2 - A Lot   Basic Mobility Raw Score (Score out of 24.Lower scores equate to lower levels of function) 17   Total Evaluation Time   Total Evaluation Time (Minutes) 20     "

## 2017-05-01 NOTE — PLAN OF CARE
Problem: Goal Outcome Summary  Goal: Goal Outcome Summary  Patient is a 77 year old year old female present with UTI, sepsis, and additional medical/orthopedic co morbidities. Prior to admission, patient was living independently ( is currently staying at [facility]) with 0 stairs to enter, using FWW for mobility, and requiring no assist for ADLs. Currently, patient is requiring CGA assistance for functional mobility and CGA for ambulation using FWW. Pt had one episode of dizziness during ambulation requiring a seated rest break for recovery. Barriers to return to previous living situation include supervision required for safety with ambulation,  as well as balance impairments leading to falls risk, reduced vitals stability with activity overall at this time.  Patient equipment needs at discharge include none.  Recommend discharge to TCU (she prefers [facility] since  is there) with family transport if desired.      Beulah Burt, SPT        Arlet Palumbo................... PT, DPT, CLT   5/1/2017, 12:05 PM  [phone]

## 2017-05-02 ENCOUNTER — APPOINTMENT (OUTPATIENT)
Dept: PHYSICAL THERAPY | Facility: CLINIC | Age: 77
DRG: 871 | End: 2017-05-02
Payer: MEDICARE

## 2017-05-02 VITALS
HEIGHT: 65 IN | WEIGHT: 189.38 LBS | OXYGEN SATURATION: 96 % | HEART RATE: 136 BPM | RESPIRATION RATE: 16 BRPM | BODY MASS INDEX: 31.55 KG/M2 | DIASTOLIC BLOOD PRESSURE: 88 MMHG | TEMPERATURE: 98.9 F | SYSTOLIC BLOOD PRESSURE: 133 MMHG

## 2017-05-02 LAB
ANION GAP SERPL CALCULATED.3IONS-SCNC: 10 MMOL/L (ref 3–14)
BASE DEFICIT BLDV-SCNC: 10.4 MMOL/L
BUN SERPL-MCNC: 37 MG/DL (ref 7–30)
CALCIUM SERPL-MCNC: 7.3 MG/DL (ref 8.5–10.1)
CHLORIDE SERPL-SCNC: 121 MMOL/L (ref 94–109)
CO2 SERPL-SCNC: 16 MMOL/L (ref 20–32)
CREAT SERPL-MCNC: 1.71 MG/DL (ref 0.52–1.04)
GFR SERPL CREATININE-BSD FRML MDRD: 29 ML/MIN/1.7M2
GLUCOSE BLDC GLUCOMTR-MCNC: 128 MG/DL (ref 70–99)
GLUCOSE BLDC GLUCOMTR-MCNC: 171 MG/DL (ref 70–99)
GLUCOSE SERPL-MCNC: 147 MG/DL (ref 70–99)
HCO3 BLDV-SCNC: 15 MMOL/L (ref 21–28)
INR PPP: 2.44 (ref 0.86–1.14)
O2/TOTAL GAS SETTING VFR VENT: 21 %
PCO2 BLDV: 31 MM HG (ref 40–50)
PH BLDV: 7.3 PH (ref 7.32–7.43)
PO2 BLDV: 54 MM HG (ref 25–47)
POTASSIUM SERPL-SCNC: 4.3 MMOL/L (ref 3.4–5.3)
SODIUM SERPL-SCNC: 147 MMOL/L (ref 133–144)

## 2017-05-02 PROCEDURE — 97530 THERAPEUTIC ACTIVITIES: CPT | Mod: GP | Performed by: PHYSICAL THERAPIST

## 2017-05-02 PROCEDURE — 40000193 ZZH STATISTIC PT WARD VISIT: Performed by: PHYSICAL THERAPIST

## 2017-05-02 PROCEDURE — 85610 PROTHROMBIN TIME: CPT | Performed by: PEDIATRICS

## 2017-05-02 PROCEDURE — 25000132 ZZH RX MED GY IP 250 OP 250 PS 637: Mod: GY | Performed by: PEDIATRICS

## 2017-05-02 PROCEDURE — 36415 COLL VENOUS BLD VENIPUNCTURE: CPT | Performed by: PEDIATRICS

## 2017-05-02 PROCEDURE — 25000132 ZZH RX MED GY IP 250 OP 250 PS 637: Mod: GY | Performed by: FAMILY MEDICINE

## 2017-05-02 PROCEDURE — 80048 BASIC METABOLIC PNL TOTAL CA: CPT | Performed by: PEDIATRICS

## 2017-05-02 PROCEDURE — A9270 NON-COVERED ITEM OR SERVICE: HCPCS | Mod: GY | Performed by: PEDIATRICS

## 2017-05-02 PROCEDURE — 00000146 ZZHCL STATISTIC GLUCOSE BY METER IP

## 2017-05-02 PROCEDURE — 25000132 ZZH RX MED GY IP 250 OP 250 PS 637: Mod: GY | Performed by: INTERNAL MEDICINE

## 2017-05-02 PROCEDURE — 99239 HOSP IP/OBS DSCHRG MGMT >30: CPT | Performed by: FAMILY MEDICINE

## 2017-05-02 PROCEDURE — 82803 BLOOD GASES ANY COMBINATION: CPT | Performed by: PEDIATRICS

## 2017-05-02 PROCEDURE — A9270 NON-COVERED ITEM OR SERVICE: HCPCS | Mod: GY | Performed by: FAMILY MEDICINE

## 2017-05-02 RX ORDER — METOPROLOL TARTRATE 50 MG
50 TABLET ORAL 2 TIMES DAILY
Status: DISCONTINUED | OUTPATIENT
Start: 2017-05-02 | End: 2017-05-02 | Stop reason: HOSPADM

## 2017-05-02 RX ORDER — PRAMIPEXOLE DIHYDROCHLORIDE 1 MG/1
TABLET ORAL
Qty: 270 TABLET | Refills: 2 | Status: ON HOLD | DISCHARGE
Start: 2017-05-02 | End: 2017-06-15

## 2017-05-02 RX ORDER — SIMVASTATIN 40 MG
TABLET ORAL
Qty: 90 TABLET | Refills: 3 | DISCHARGE
Start: 2017-05-02 | End: 2017-05-05

## 2017-05-02 RX ORDER — FUROSEMIDE 40 MG
TABLET ORAL
Qty: 90 TABLET | Refills: 3 | Status: ON HOLD | DISCHARGE
Start: 2017-05-02 | End: 2017-06-15

## 2017-05-02 RX ORDER — CEFDINIR 300 MG/1
300 CAPSULE ORAL DAILY
Refills: 0 | DISCHARGE
Start: 2017-05-02 | End: 2017-05-09

## 2017-05-02 RX ORDER — METOPROLOL TARTRATE 50 MG
50 TABLET ORAL 2 TIMES DAILY
Qty: 60 TABLET | DISCHARGE
Start: 2017-05-02 | End: 2017-05-15

## 2017-05-02 RX ORDER — WARFARIN SODIUM 2.5 MG/1
TABLET ORAL
Qty: 180 TABLET | Refills: 0 | DISCHARGE
Start: 2017-05-02 | End: 2017-05-15

## 2017-05-02 RX ADMIN — FERROUS SULFATE 325 MG: 325 TABLET, FILM COATED ORAL at 08:25

## 2017-05-02 RX ADMIN — CEFDINIR 300 MG: 300 CAPSULE ORAL at 08:26

## 2017-05-02 RX ADMIN — SODIUM BICARBONATE 650 MG TABLET 1300 MG: at 12:11

## 2017-05-02 RX ADMIN — ISOSORBIDE MONONITRATE 30 MG: 30 TABLET, EXTENDED RELEASE ORAL at 08:26

## 2017-05-02 RX ADMIN — AMIODARONE HYDROCHLORIDE 200 MG: 200 TABLET ORAL at 08:26

## 2017-05-02 RX ADMIN — SODIUM BICARBONATE 650 MG TABLET 1300 MG: at 08:26

## 2017-05-02 RX ADMIN — METOPROLOL TARTRATE 50 MG: 50 TABLET, FILM COATED ORAL at 08:26

## 2017-05-02 RX ADMIN — FUROSEMIDE 40 MG: 40 TABLET ORAL at 08:26

## 2017-05-02 NOTE — PROGRESS NOTES
"Received a call from [facility] stating that pt has not yet arrived.  Writer attempted to reach pt at home #[phone] with no response.  Writer contacted pt's daughter at #[phone].  Pt's daughter reports that \"They were getting pedicures and that they will be to [facility] in 10 minutes\"  Writer called [facility] back to update them.  Jaime Pipkin RN  "

## 2017-05-02 NOTE — PROGRESS NOTES
S-(situation): Patient discharged to [facility] via wheelchair with daughter    B-(background): UTI,sepsis    A-(assessment): Pt.discharged to [facility].Report given earlier to Josephine regarding pt.She will be there for physical therapy to get stronger.    R-(recommendations): Discharge instructions reviewed with pt. Listed belongings gathered and returned to patient.          Discharge Nursing Criteria:     Care Plan and Patient education resolved: Yes    New Medications- pt has been educated about purpose and side effects: Yes    Vaccines  Pneumonia Vaccine verified at discharge: Yes  Influenza status verified at discharge:  Yes      MISC  Prescriptions if needed, hard copies sent with patient  NA  Home and hospital aquired medications returned to patient: Yes  Medication Bin checked and emptied on discharge Yes  Patient reports post-discharge pain management plan is effective: Yes

## 2017-05-02 NOTE — DISCHARGE SUMMARY
[facility]    Discharge Summary  Hospitalist    Date of Admission:  4/28/2017  Date of Discharge:  5/2/2017  Discharging Provider: Jian Hartley MD  Date of Service (when I saw the patient): 05/02/17    Discharge Diagnoses   Principal Problem:    UTI due to Klebsiella species  Active Problems:    Atrial fibrillation with rapid ventricular response (H)    Severe sepsis with acute organ dysfunction due to Gram negative bacteria (H)    Chronic kidney disease, stage III (moderate)    CAD - NSTEMI, CABG x 5 2007, angio in 2013 with patent grafts other than occluded graft to PL    Lymphedema    Anemia    Type 2 diabetes mellitus with other circulatory complications (H)    Acute kidney injury (H)    Essential hypertension with goal blood pressure less than 140/90    Rash - redness medial thighs    Supratherapeutic INR    Metabolic acidosis, normal anion gap (NAG)    Acute encephalopathy    Restless leg syndrome    Sleep apnea        History of Present Illness   Copied from H&P:  Kathryn Banks is a 77 year old female who presents with rapid heart rate secondary to atrial fibrillation. She lives alone at home, recovering from hip surgery in January. She states that she fell asleep on the commode last evening. Her home healthcare physical therapist stopped by this morning and could not get an answer from her. She was concerned, called EMS and ultimately they were able to get hold of the patient by phone and she came and opened the door. Evaluation by EMS on the scene was worrisome for a heart rate of 140. Patient was having some mild shortness of breath but denied any chest pain. She has history of intermittent paroxysmal atrial fibrillation. She takes chronic Coumadin and beta blocker, although states she is not sure if she took her medicines yesterday. She has history of a NSTEMI in 2007 followed by a quadruple bypass. She states that home she's not had the nausea or vomiting. She's  been more tired but denies any fever, chills, sweats. She notes some urinary frequency but no dysuria or hematuria. Her legs are chronically swollen, may be somewhat more red over the upper left thigh. She denies any open sores. She is diabetic taking glipizide, checking blood sugars typically in the 140 range.       Hospital Course   Kathryn Banks was admitted on 4/28/2017.  The following problems were addressed during her hospitalization:    Principal Problem:    UTI due to Klebsiella species  Active Problems:    Atrial fibrillation with rapid ventricular response (H)    Severe sepsis with acute organ dysfunction due to Gram negative bacteria (H)    Chronic kidney disease, stage III (moderate)    CAD - NSTEMI, CABG x 5 2007, angio in 2013 with patent grafts other than occluded graft to PL    Lymphedema    Anemia    Type 2 diabetes mellitus with other circulatory complications (H)    Acute kidney injury (H)    Essential hypertension with goal blood pressure less than 140/90    Rash - redness medial thighs    Supratherapeutic INR    Metabolic acidosis, normal anion gap (NAG)    Acute encephalopathy    Restless leg syndrome    Sleep apnea    77-year-old female who was found at home weak with a rapid heart rate due to atrial fibrillation. On admission had a rapid heart rate in the 140s and was initially treated with IV diltiazem for rate control. Lab work was concerning for acute kidney injury and labs demonstrated that she had a urinary tract infection. She was initially septic with tachycardia, hypotension and leukocytosis. She was treated with IV fluids and started on broad-spectrum antibiotics including Zosyn and vancomycin. There was some concern whether she might have a cellulitis in her left leg with a history of chronic lymphedema and increased redness in the left upper thigh. She was admitted to the ICU. Her blood pressure did not tolerate the diltiazem drip and ultimately she was changed over to  amiodarone with a load and then a slow loading infusion over 36 hours. She tolerated this well with her rate the controlled in the 80s. Urine culture came out with Klebsiella and she was switched over to oral cefdinir. She still remains somewhat weak and was determined that it would be best to discharge to a rehab facility for strengthening before going home. At discharge, her diabetes was being controlled with with insulin with her glipizide on hold due to renal insufficiency. This will need to be followed. She remains on Coumadin and this will be need to be monitored and adjusted as well.  Jian Hartley MD    Significant Results and Procedures   No procedures performed during this admission    Pending Results   [facility] Ordered in the Past 30 Days of this Admission     Date and Time Order Name Status Description    4/28/2017 1616 Blood culture Preliminary     4/28/2017 1616 Blood culture Preliminary           Code Status   Full Code       Primary Care Physician   Dheeraj Jj    Physical Exam   Temp: 98.9  F (37.2  C) Temp src: Oral BP: 133/88 Pulse: 136 Heart Rate: 136 Resp: 16 SpO2: 96 % O2 Device: None (Room air)    Vitals:    04/30/17 1200 05/01/17 0529 05/02/17 0500   Weight: 88.5 kg (195 lb 1.7 oz) 79.5 kg (175 lb 4.3 oz) 85.9 kg (189 lb 6 oz)     Vital Signs with Ranges  Temp:  [98.4  F (36.9  C)-98.9  F (37.2  C)] 98.9  F (37.2  C)  Pulse:  [] 136  Heart Rate:  [110-136] 136  Resp:  [16-20] 16  BP: (126-137)/() 133/88  SpO2:  [96 %] 96 %  I/O last 3 completed shifts:  In: 1320 [P.O.:1320]  Out: 3460 [Urine:3460]    Constitutional: awake, alert, cooperative, no apparent distress, and appears stated age  Respiratory: No increased work of breathing, good air exchange, clear to auscultation bilaterally, no crackles or wheezing  Cardiovascular: irregular rhythm, rate controlled  GI: normal bowel sounds, soft,  non-distended and non-tender  Skin: having some generalized stasis changes in her lower legs. Still somewhat tight up into the left medial thigh, but no redness and no tenderness and no bruising or bleeding  Musculoskeletal: full range of motion noted  tone is normal    Discharge Disposition   Discharged to short-term care facility  Condition at discharge: Good    Consultations This Hospital Stay   PHARMACY TO DOSE VANCO  PHARMACY TO DOSE WARFARIN  SPIRITUAL HEALTH SERVICES IP CONSULT  PHYSICAL THERAPY ADULT IP CONSULT  PHYSICAL THERAPY ADULT IP CONSULT  OCCUPATIONAL THERAPY ADULT IP CONSULT    Time Spent on this Encounter   I, Jian Hartley MD, personally saw the patient today and spent greater than 30 minutes discharging this patient.    Discharge Orders     General info for SNF   Length of Stay Estimate: Short Term Care: Estimated # of Days <30  Condition at Discharge: Improving  Level of care:skilled   Rehabilitation Potential: Good  Admission H&P remains valid and up-to-date: Yes  Recent Chemotherapy: N/A  Use Nursing Home Standing Orders: Yes     Mantoux instructions   Give two-step Mantoux (PPD) Per Facility Policy Yes     Glucose monitor nursing POCT   Before meals and at bedtime     Follow Up and recommended labs and tests   Follow up with alf physician.  The following labs/tests are recommended: follow-up on  Renal function, potentially restart glipizide when resolved.     Activity - Up with nursing assistance     Full Code     Physical Therapy Adult Consult   Evaluate and treat as clinically indicated.    Reason:  Weakness at home     Occupational Therapy Adult Consult   Evaluate and treat as clinically indicated.    Reason:  Weakness at home     Advance Diet as Tolerated   Follow this diet upon discharge: Orders Placed This Encounter     Room Service     Consistent Carbohydrate Diet [id number] Calories: Moderate Consistent CHO (4-6 CHO units/meal)       Discharge Medications   Current  Discharge Medication List      START taking these medications    Details   !! insulin aspart (NOVOLOG PEN) 100 UNIT/ML injection Inject 1-7 Units Subcutaneous 3 times daily (with meals) Correction Scale - MEDIUM INSULIN RESISTANCE DOSING     Do Not give Correction Insulin if BG < 140.  For  - 189 give 1 unit.  For  - 239 give 2 units.  For  - 289 give 3 units.  For  - 339 give 4 units.  For BG = or > 340 give 5 units.  Check blood glucose before meals/bolus feedings and administer based on blood glucose.  Notify MD if glu > or = 350    Associated Diagnoses: Type 2 diabetes mellitus with other circulatory complications (H)      !! insulin aspart (NOVOLOG PEN) 100 UNIT/ML injection Inject 1-5 Units Subcutaneous At Bedtime Bedtime Blood Glucose (BG)  For  - 249 give 1 units.   For  - 299 give 2 units.   For  - 349 give 3 units.  For - 399 give 4 units.   For BG = or > 400 give 5 units.    Associated Diagnoses: Type 2 diabetes mellitus with other circulatory complications (H)      metoprolol (LOPRESSOR) 50 MG tablet Take 1 tablet (50 mg) by mouth 2 times daily  Qty: 60 tablet    Associated Diagnoses: Atrial fibrillation with rapid ventricular response (H)      cefdinir (OMNICEF) 300 MG capsule Take 1 capsule (300 mg) by mouth daily for 7 days  Refills: 0    Associated Diagnoses: Urinary tract infection without hematuria, site unspecified       !! - Potential duplicate medications found. Please discuss with provider.      CONTINUE these medications which have CHANGED    Details   warfarin (COUMADIN) 2.5 MG tablet as directed by the coumadin clinic  Qty: 180 tablet, Refills: 0    Associated Diagnoses: Long-term (current) use of anticoagulants; Paroxysmal atrial fibrillation (H)      simvastatin (ZOCOR) 40 MG tablet TAKE ONE TABLET BY MOUTH EVERY NIGHT AT BEDTIME  Qty: 90 tablet, Refills: 3    Associated Diagnoses: Hypertension goal BP (blood pressure) < 140/90;  Hyperlipidemia LDL goal <100      pramipexole (MIRAPEX) 1 MG tablet TAKE 3 TABLETS BY MOUTH EVERY EVENING  Qty: 270 tablet, Refills: 2    Associated Diagnoses: Restless legs syndrome (RLS); Hyperlipidemia LDL goal <100; Hypertension goal BP (blood pressure) < 140/90      furosemide (LASIX) 40 MG tablet 40 mg a day  Qty: 90 tablet, Refills: 3    Associated Diagnoses: Lymphedema         CONTINUE these medications which have NOT CHANGED    Details   cyanocobalamin (VITAMIN B12) 1000 MCG/ML injection Inject 1 mL (1,000 mcg) into the muscle every 30 days  Qty: 1 mL, Refills: 11    Associated Diagnoses: Vitamin B12 deficiency (non anemic)      isosorbide mononitrate (IMDUR) 30 MG 24 hr tablet Take 3 tablets (90 mg) by mouth daily  Qty: 30 tablet, Refills: 2    Associated Diagnoses: Hx of non-ST elevation myocardial infarction (NSTEMI)      gabapentin (NEURONTIN) 300 MG capsule Take 1 capsule (300 mg) by mouth 2 times daily  Qty: 180 capsule, Refills: 1    Associated Diagnoses: Restless leg syndrome      ferrous sulfate (IRON) 325 (65 FE) MG tablet Take 1 tablet (325 mg) by mouth daily (with breakfast)  Qty: 100 tablet    Associated Diagnoses: Anemia, unspecified type      calcium carbonate-vitamin D 500-400 MG-UNIT TABS tablt Take 1 tablet by mouth 3 times daily  Qty: 180 tablet, Refills: 3    Associated Diagnoses: Closed intertrochanteric fracture of left hip, initial encounter (H)      nitroglycerin (NITROSTAT) 0.4 MG SL tablet Place 1 tablet under the tongue See Admin Instructions. for chest pain  Qty: 25 tablet, Refills: 0    Associated Diagnoses: Postsurgical aortocoronary bypass status      ASPIRIN NOT PRESCRIBED (INTENTIONAL) Antiplatelet medication not prescribed intentionally due to Current anticoagulant therapy (warfarin/enoxaparin)  Qty: 0 each, Refills: 0    Associated Diagnoses: Type 2 diabetes mellitus with other circulatory complications (H)      ORDER FOR DME Equipment being ordered: cpap machine, mask,  humidifier, tubing and filters  Qty: 1 Device, Refills: 0    Associated Diagnoses: ANABEL (obstructive sleep apnea)         STOP taking these medications       metoprolol (TOPROL-XL) 100 MG 24 hr tablet Comments:   Reason for Stopping:         glipiZIDE (GLIPIZIDE XL) 10 MG 24 hr tablet Comments:   Reason for Stopping:             Allergies   Allergies   Allergen Reactions     Ciprofloxacin Nausea and Vomiting     Zofran did not help     Oxycodone Visual Disturbance     Delusions, blackouts      Lisinopril Cough     Penicillins Rash     Sulfa Drugs GI Disturbance     LOSS OF TASTE     Data   Most Recent 3 CBC's:  Recent Labs   Lab Test  05/01/17   0518  04/30/17   0536  04/29/17   0537  04/28/17   1050   WBC   --   8.3  12.9*  22.4*   HGB  8.8*  8.7*  9.0*  10.7*   MCV   --   82  83  82   PLT   --   246  246  283      Most Recent 3 BMP's:  Recent Labs   Lab Test  05/02/17   0530  05/01/17   0518  04/30/17   0536   NA  147*  146*  144   POTASSIUM  4.3  4.7  4.6   CHLORIDE  121*  122*  121*   CO2  16*  12*  13*   BUN  37*  48*  45*   CR  1.71*  1.93*  1.82*   ANIONGAP  10  12  10   MALICK  7.3*  7.2*  7.2*   GLC  147*  174*  74     Most Recent 2 LFT's:  Recent Labs   Lab Test  04/28/17   1050  03/31/17   1510   AST  13  17   ALT  23  19   ALKPHOS  242*  263*   BILITOTAL  0.3  0.3     Most Recent INR's and Anticoagulation Dosing History:  Anticoagulation Dose History     Recent Dosing and Labs Latest Ref Rng & Units 4/18/2017 4/26/2017 4/28/2017 4/29/2017 4/30/2017 5/1/2017 5/2/2017    Warfarin 2.5 mg - - - 2.5 mg - - - -    INR 0.86 - 1.14 1.9 2.0 3.07(H) 5.13(HH) 4.64(H) 3.41(H) 2.44(H)    INR 0.86 - 1.14 - - - - - - -    INR Point of Care 0.86 - 1.14 - - - - - - -        Most Recent 3 Troponin's:  Recent Labs   Lab Test  04/28/17   1050  07/02/16   0125  03/23/15   1626   02/22/14   0024   TROPI  0.026  0.019  <0.015  The 99th percentile for upper reference range is 0.045 ug/L.  Troponin values in   the range of 0.045 -  0.120 ug/L may be associated with risks of adverse   clinical events.   Effective 7/30/2014, the reference range for this assay has changed to reflect   new instrumentation/methodology.     < >   --    TROPONIN   --    --    --    --   0.01    < > = values in this interval not displayed.     Most Recent Cholesterol Panel:  Recent Labs   Lab Test  04/12/16   1132   CHOL  127   LDL  47   HDL  65   TRIG  73     Most Recent 6 Bacteria Isolates From Any Culture (See EPIC Reports for Culture Details):  Recent Labs   Lab Test  04/28/17   1747  04/28/17   1624  04/28/17   1621  04/28/17   1130  07/02/16   2020  07/02/16 2015   CULT  No MRSA isolated  No growth after 4 days  No growth after 4 days  >100,000 colonies/mL Klebsiella pneumoniae*  No growth after 6 days  No growth after 6 days     Most Recent TSH, T4 and A1c Labs:  Recent Labs   Lab Test  04/28/17   1050  03/31/17   1510   TSH  0.75   --    A1C   --   6.2*     Results for orders placed or performed during the hospital encounter of 04/28/17   XR Chest 2 Views    Narrative    XR CHEST 2 VW 4/28/2017 11:01 AM    COMPARISON: 7/2/2016    HISTORY: Generalized weakness, atrial fibrillation with rapid  ventricular response.      Impression    IMPRESSION: Cardiac silhouette and pulmonary vasculature are within  normal limits. No focal airspace disease, pleural effusion or  pneumothorax.    ANUP DURAND   CT Head w/o Contrast    Narrative    CT SCAN OF THE HEAD WITHOUT CONTRAST   4/28/2017 11:11 AM     HISTORY: Generalized weakness with confusion.    TECHNIQUE:  Axial images of the head and coronal reformations without  IV contrast material.  Radiation dose for this scan was reduced using  automated exposure control, adjustment of the mA and/or kV according  to patient size, or iterative reconstruction technique.    COMPARISON: 11/14/2012    FINDINGS: The ventricles are normal in size, shape, and configuration.  The brain parenchyma and subarachnoid spaces are within  normal limits.  There is no evidence for intracranial hemorrhage, mass effect, acute  infarct, or skull fracture. Visualized paranasal sinuses and mastoid  air cells are clear.      Impression    IMPRESSION: Negative head CT without contrast.    EDUARDO AGUILAR MD   US Renal Limited Portable    Narrative    US RENAL LIMITED PORTABLE 4/28/2017 3:35 PM     HISTORY: Left flank pain.    COMPARISON: CT abdomen and pelvis 6/7/2012    FINDINGS: Exam is limited by patient's inability to completely  cooperate. Right kidney measures 8.4 cm in length and left kidney  measures 8.8 cm in length. This renal length is less than seen on the  prior CT exam, likely due to the fact that the kidneys could not be  completely visualized on the ultrasound. Renal parenchymal thickness  and echogenicity appears normal where visualized. No hydronephrosis,  mass lesions or abnormal calcifications bilaterally. Urinary bladder  was decompressed.      Impression    IMPRESSION: No significant abnormalities.    EVA SANTOYO MD     *Note: Due to a large number of results and/or encounters for the requested time period, some results have not been displayed. A complete set of results can be found in Results Review.

## 2017-05-02 NOTE — PLAN OF CARE
Problem: Goal Outcome Summary  Goal: Goal Outcome Summary     Goal status:progressing with mobility with reported and observed SBA some of the time and CGA occasional     Functional status: per above     Areas of progress:mobiltiy improving and less SOB, HR stable with activity    Barrier to discharge Home: independence needed for safe home mobility and decreased tolerance to daily activity on own     Equipment needs: defer to TCU     Discharge disposition/rationale: TCU     Transport recommendations: family/friend, car

## 2017-05-02 NOTE — PROGRESS NOTES
Name: Kathryn Banks    MRN#: [id number]    Reason for Hospitalization: Acute pyelonephritis [N10]  Atrial fibrillation with rapid ventricular response (H) [I48.91]  Urinary tract infection without hematuria, site unspecified [N39.0]  Atrial flutter, unspecified type (H) [I48.92]    Discharge Date: 5/2/2017    Patient / Family response to discharge plan: in agreement    Follow-Up Appt: No future appointments.    Other Providers (Care Coordinator, County Services, PCA services etc): No    Discharge Disposition: transitional care unit - [facility] (Admissions: [phone] Main Phone: [phone] Fax: [phone]) via family car.    PAS-RR    D: Per DHS regulation, EVELIO completed and submitted PAS-RR to [place] Board on Aging Direct Connect via the Senior LinkAge Line.  PAS-RR confirmation # is : [id number]      P: Further questions may be directed to Senior LinkAge Line at #[phone], option #4 for PAS-RR staff.      BON Simms  [facility] [phone]/ [facility] [phone]

## 2017-05-02 NOTE — PROGRESS NOTES
Clinic Care Coordination Contact  Care Team Conversations    Received a VM that pt will be discharged from  nest week at pt's request and she does have a lot of concerns regarding her ability to take her medications correctly.  Reviewed Chart, pt was admitted to hospital, will follow up on hospital discharged.       Meenakshi Larsen RN,BSN  Clinical Care Coordinator    [facility] [phone]  [facility] [phone]

## 2017-05-02 NOTE — PROGRESS NOTES
"Kathryn Banks  Gender: female  : 1940  [address] [id number] [phone] (home)     Medical Record: [id number]  Pharmacy:    [facility] [place] [address]  [facility] [place], [place] - [address] [facility] [place] [address]  [facility] [place], [place] - [address]  [facility] - [place], [place] - [address]  Primary Care Provider: Dheeraj Jj    Parent's names are: Data Unavailable (mother) and Data Unavailable (father).      [facility]  May 2, 2017     Discharge Phone Call:  Key Words/Key Times      Introduction - AIDET (Acknowledge, Introduce, Duration, Explanation)      Empathy-   We are calling to see how you are since your recent stay in the hospital?     Call back COMMENTS:       Clinical Questions -  (f/u appts, medication side effects/purpose, ability to care for self at home) \"For your safety, it is important to us that you understand the purpose and side effects of your medications, can you tell me what your new medications are?\"     Call back COMMENTS:       Staff Recognition -  We like to recognize staff and physicians who have done an excellent job.  Do you remember any people from your care team that you would like recognize?     Call back COMMENTS:       Very Good Care -  We want to provide very good care to all patients.  How was your care?     Call back COMMENTS:       Opportunities for Improvement -  Our goal is to be the best.  Do you have any suggestions for things that we could improve upon?     Call back COMMENTS:       Thank You     17 1240 Pt discharged to [facility].  Jaime Pipkin RN      "

## 2017-05-02 NOTE — PLAN OF CARE
Problem: Discharge Planning  Goal: Discharge Planning (Adult, OB, Behavioral, Peds)  Outcome: Improving  Pt denies pain, up with SBA, adequate I and O, 97% on RA, ABX continue, coumadin dose adjusted, tele-A fib, BP WNL, A and O.

## 2017-05-02 NOTE — PLAN OF CARE
Problem: Goal Outcome Summary  Goal: Goal Outcome Summary  Outcome: No Change  Pt on Tele. HR 110s-120s most of shift. Afbrile. After using BR at 0530 pt HR remained in 130s. MD notified with new orders (see MAR). Pt asymptomatic. Denies any pain/discomfort this shift. Pt clam and appropriate this shift. Denies any nausea or dizziness this shift. No complaints this shift.

## 2017-05-03 ENCOUNTER — NURSING HOME VISIT (OUTPATIENT)
Dept: GERIATRICS | Facility: CLINIC | Age: 77
End: 2017-05-03
Payer: COMMERCIAL

## 2017-05-03 ENCOUNTER — CARE COORDINATION (OUTPATIENT)
Dept: CARE COORDINATION | Facility: CLINIC | Age: 77
End: 2017-05-03

## 2017-05-03 DIAGNOSIS — G93.40 ACUTE ENCEPHALOPATHY: ICD-10-CM

## 2017-05-03 DIAGNOSIS — I10 BENIGN ESSENTIAL HYPERTENSION: ICD-10-CM

## 2017-05-03 DIAGNOSIS — R53.81 PHYSICAL DECONDITIONING: ICD-10-CM

## 2017-05-03 DIAGNOSIS — I48.20 CHRONIC ATRIAL FIBRILLATION (H): Primary | ICD-10-CM

## 2017-05-03 DIAGNOSIS — E11.59 TYPE 2 DIABETES MELLITUS WITH OTHER CIRCULATORY COMPLICATIONS (H): ICD-10-CM

## 2017-05-03 DIAGNOSIS — D64.9 ANEMIA, UNSPECIFIED TYPE: ICD-10-CM

## 2017-05-03 DIAGNOSIS — I25.10 CORONARY ARTERY DISEASE INVOLVING NATIVE HEART WITHOUT ANGINA PECTORIS, UNSPECIFIED VESSEL OR LESION TYPE: ICD-10-CM

## 2017-05-03 DIAGNOSIS — G25.81 RESTLESS LEGS SYNDROME (RLS): ICD-10-CM

## 2017-05-03 PROCEDURE — 99207 ZZC CDG-CORRECTLY CODED, REVIEWED AND AGREE: CPT | Performed by: NURSE PRACTITIONER

## 2017-05-03 PROCEDURE — 99310 SBSQ NF CARE HIGH MDM 45: CPT | Performed by: NURSE PRACTITIONER

## 2017-05-03 NOTE — PROGRESS NOTES
Clinic Care Coordination Contact  Care Team Conversations    Recieved a Care Transition referral from [facility]. [facility].  Outreached to TCU to notify Clinical Care Coordination once discharge date is determined.    Meenakshi Larsen RN  Clinical Care Coordinator    [place]  [phone]  [facility] [phone]

## 2017-05-03 NOTE — PROGRESS NOTES
[facility]  PRIMARY CARE PROVIDER AND CLINIC:  Dheeraj Jj [facility] [address] [facility]  / [place] 5*  Chief Complaint   Patient presents with     Hospital F/U       HPI:    Kathryn Banks is a 77 year old  (1940),admitted to the [facility]  from [facility].  Hospital stay 4/28 through 5/2.  Admitted to this facility for  rehab, medical management and nursing care. Current issues are:         Chronic atrial fibrillation (H)  Physical deconditioning  Restless legs syndrome (RLS)  Coronary artery disease involving native heart without angina pectoris, unspecified vessel or lesion type  Anemia, unspecified type  Benign essential hypertension  Type 2 diabetes mellitus with other circulatory complications (H)  Acute encephalopathy     See assessment / plan for HPI     CODE STATUS/ADVANCE DIRECTIVES DISCUSSION:   CPR/Full code   Patient's living condition: lives with spouse    ALLERGIES:Ciprofloxacin; Oxycodone; Lisinopril; Penicillins; and Sulfa drugs  PAST MEDICAL HISTORY:  has a past medical history of Atrial fibrillation (H) (1/17/2007); Carpal tunnel syndrome; Coronary atherosclerosis of native coronary artery (2006); OBSTRUCTIVE SLEEP APNEA; Osteoarthrosis, unspecified whether generalized or localized, unspecified site; Other and combined forms of senile cataract (2000); Other and unspecified hyperlipidemia; RESTLESS LEGS SYNDROME; TENOSYNOV HAND/WRIST NEC (6/30/2006); Type II or unspecified type diabetes mellitus without mention of complication, not stated as uncontrolled (2001); and Unspecified essential hypertension.  PAST SURGICAL HISTORY:  has a past surgical history that includes TOTAL ABDOM HYSTERECTOMY (1995); REMOVAL OF OVARY/TUBE(S); APPENDECTOMY; REVISE MEDIAN N/CARPAL TUNNEL SURG; SOTO W/O FACETEC FORAMOT/DSKC 1/2 VRT SEG, LUMBAR (1968); VAGOTOMY/PYLOROPLASTY,SELECT (1970); REMV CATARACT INTRACAP,INSERT LENS;  COLONOSCOPY THRU STOMA, DIAGNOSTIC (10/7/04); endoscopy (03/21/2000); Colonoscopy w/wo [place] **Performed** (10/31/07); UGI ENDOSCOPY DIAG W BIOPSY (10/31/07); colonoscopy (12/3/2007); UPPER GI ENDOSCOPY,EXAM (12/3/2007); cystoscopy (11/25/09); angioplasty; CABG, VEIN, FIVE (12/06); Colonoscopy (1/17/2014); and Open reduction internal fixation hip nailing (Left, 7/3/2016).  FAMILY HISTORY: family history includes Blood Disease in her father; DIABETES in her father, maternal grandmother, and paternal grandmother; Neurologic Disorder in her father. There is no history of Hypertension or CEREBROVASCULAR DISEASE.  SOCIAL HISTORY:  reports that she quit smoking about 48 years ago. She quit after 10.00 years of use. She has never used smokeless tobacco. She reports that she drinks alcohol. She reports that she does not use illicit drugs.    Post Discharge Medication Reconciliation Status: discharge medications reconciled and changed, per note/orders (see AVS).  Current Outpatient Prescriptions   Medication Sig Dispense Refill     warfarin (COUMADIN) 2.5 MG tablet as directed by the coumadin clinic 180 tablet 0     insulin aspart (NOVOLOG PEN) 100 UNIT/ML injection Inject 1-7 Units Subcutaneous 3 times daily (with meals) Correction Scale - MEDIUM INSULIN RESISTANCE DOSING     Do Not give Correction Insulin if BG < 140.  For  - 189 give 1 unit.  For  - 239 give 2 units.  For  - 289 give 3 units.  For  - 339 give 4 units.  For BG = or > 340 give 5 units.  Check blood glucose before meals/bolus feedings and administer based on blood glucose.  Notify MD if glu > or = 350       insulin aspart (NOVOLOG PEN) 100 UNIT/ML injection Inject 1-5 Units Subcutaneous At Bedtime Bedtime Blood Glucose (BG)  For  - 249 give 1 units.   For  - 299 give 2 units.   For  - 349 give 3 units.  For - 399 give 4 units.   For BG = or > 400 give 5 units.       simvastatin (ZOCOR) 40 MG tablet TAKE ONE  TABLET BY MOUTH EVERY NIGHT AT BEDTIME 90 tablet 3     pramipexole (MIRAPEX) 1 MG tablet TAKE 3 TABLETS BY MOUTH EVERY EVENING 270 tablet 2     metoprolol (LOPRESSOR) 50 MG tablet Take 1 tablet (50 mg) by mouth 2 times daily 60 tablet      cefdinir (OMNICEF) 300 MG capsule Take 1 capsule (300 mg) by mouth daily for 7 days  0     furosemide (LASIX) 40 MG tablet 40 mg a day 90 tablet 3     cyanocobalamin (VITAMIN B12) 1000 MCG/ML injection Inject 1 mL (1,000 mcg) into the muscle every 30 days 1 mL 11     isosorbide mononitrate (IMDUR) 30 MG 24 hr tablet Take 3 tablets (90 mg) by mouth daily 30 tablet 2     gabapentin (NEURONTIN) 300 MG capsule Take 1 capsule (300 mg) by mouth 2 times daily 180 capsule 1     ferrous sulfate (IRON) 325 (65 FE) MG tablet Take 1 tablet (325 mg) by mouth daily (with breakfast) 100 tablet      calcium carbonate-vitamin D 500-400 MG-UNIT TABS tablt Take 1 tablet by mouth 3 times daily 180 tablet 3     ASPIRIN NOT PRESCRIBED (INTENTIONAL) Antiplatelet medication not prescribed intentionally due to Current anticoagulant therapy (warfarin/enoxaparin) 0 each 0     ORDER FOR DME Equipment being ordered: cpap machine, mask, humidifier, tubing and filters 1 Device 0     nitroglycerin (NITROSTAT) 0.4 MG SL tablet Place 1 tablet under the tongue See Admin Instructions. for chest pain 25 tablet 0       ROS:  10 point ROS of systems including Constitutional, Eyes, Respiratory, Cardiovascular, Gastroenterology, Genitourinary, Integumentary, Muscularskeletal, Psychiatric were all negative except for pertinent positives noted in my HPI.    Exam:  BP (!) 143/91  Pulse 98  Temp 98  F (36.7  C)  Resp 18  Wt 185 lb (83.9 kg)  BMI 30.79 kg/m2  GENERAL APPEARANCE:  Alert, in no distress  RESP:  respiratory effort and palpation of chest normal, no respiratory distress, lungs sounds clear  CV:  Palpation and auscultation of heart done , regular rate and rhythm, no murmur, rub, or gallop, edema +1 pitting  Bilateral LE  ABDOMEN:  normal bowel sounds, soft, nontender, no hepatosplenomegaly or other masses  M/S:  Gait and station obs in wheelchair, no tenderness or swelling of the joints   SKIN:  Inspection and palpation of skin and subcutaneous tissue at baseline bruising present on arms.   PSYCH:  insight and judgement, memory appears intact, but forgetful, affect and mood normal    Lab/Diagnostic data:  CBC RESULTS:   Recent Labs   Lab Test  05/01/17   0518  04/30/17   0536  04/29/17   0537   WBC   --   8.3  12.9*   RBC   --   3.66*  3.81   HGB  8.8*  8.7*  9.0*   HCT   --   30.1*  31.5*   MCV   --   82  83   MCH   --   23.8*  23.6*   MCHC   --   28.9*  28.6*   RDW   --   19.2*  19.0*   PLT   --   246  246       Last Basic Metabolic Panel:  Recent Labs   Lab Test  05/02/17   0530  05/01/17   0518   NA  147*  146*   POTASSIUM  4.3  4.7   CHLORIDE  121*  122*   MALICK  7.3*  7.2*   CO2  16*  12*   BUN  37*  48*   CR  1.71*  1.93*   GLC  147*  174*       Liver Function Studies -   Recent Labs   Lab Test  04/28/17   1050  03/31/17   1510   PROTTOTAL  5.9*  5.5*   ALBUMIN  2.6*  2.5*   BILITOTAL  0.3  0.3   ALKPHOS  242*  263*   AST  13  17   ALT  23  19       TSH   Date Value Ref Range Status   04/28/2017 0.75 0.40 - 4.00 mU/L Final   01/10/2017 1.03 0.40 - 4.00 mU/L Final   ]    Lab Results   Component Value Date    A1C 6.2 03/31/2017    A1C 6.6 07/03/2016       ASSESSMENT/PLAN:  Chronic atrial fibrillation (H)  HR 90 - 140's at TCU. She is symptomatic with shortness of breath.  Chart reivewed and Diltiazem IV was not effective in controlling her HR and she was started on IV amio and then transitioned to oral amiodorone. She does not have amiodorone listed on her discharge meds and I do not see a reason that it was stopped.   - continue metoprolol 50 mg PO twice daily at 8am and 8pm  - start amiodorone 200 mg po every day  - consult placed to cardiology in the next few business days.   - if HR consistently elevated or  patient unstable would need to be reevaluated in the ED.     Physical deconditioning  Due to recent sepsis and hospital stay. Currently at TCU for Physical Therapy And Ocupational Therapy goal is to return home with her .  - continue Physical Therapy / Ocupational Therapy     Restless legs syndrome (RLS)  Controlled with gabapentin and mirapex    Coronary artery disease involving native heart without angina pectoris, unspecified vessel or lesion type  Denies chest pain. On imdur, lasix, metoprolol and simvastatin  - no changes    Anemia, unspecified type  Hemoglobin   Date Value Ref Range Status   05/01/2017 8.8 (L) 11.7 - 15.7 g/dL Final   on iron supplements. Recent decrease in hgb due to sepsis.   - recheck on friday    Benign essential hypertension  BP goal is < 150/80mmHg.  To avoid risk of hypotension, falls, dizziness and tissue hypoperfusion.  BP Readings from Last 3 Encounters:   05/04/17 (!) 143/91   05/02/17 133/88   03/31/17 126/76    Controlled: elevated  Medications: metoprolol (dose reduced at hospital) lasix, imdur,   Lab Results   Component Value Date    CR 1.71 05/02/2017     Plan: recheck with subseq visits    Type 2 diabetes mellitus with other circulatory complications (H)  Was on glipizide at home. Currently on sliding scale novolog. Good control at baseline.   Lab Results   Component Value Date    A1C 6.2 03/31/2017    A1C 6.6 07/03/2016    A1C 7.1 04/12/2016    A1C 6.0 06/08/2015    A1C 7.0 11/11/2014   Plan: add back glipizide prior to D/C    Acute encephalopathy  Due to sepsis, improving.   - SLP eval and treat for cognition    MARTÍN.   Creat 1.71  Recheck on Friday        Orders:  1. Bmp , hgb on Friday  2. amiodorone 200 mg po once daily dx a fib  3. Consult to cardiology in the next few business days      Electronically signed by:  Hortensia Alcala NP

## 2017-05-04 VITALS
RESPIRATION RATE: 18 BRPM | SYSTOLIC BLOOD PRESSURE: 143 MMHG | TEMPERATURE: 98 F | DIASTOLIC BLOOD PRESSURE: 91 MMHG | HEART RATE: 98 BPM | BODY MASS INDEX: 30.79 KG/M2 | WEIGHT: 185 LBS

## 2017-05-04 LAB
BACTERIA SPEC CULT: NORMAL
BACTERIA SPEC CULT: NORMAL
Lab: 30
Lab: 30
MICRO REPORT STATUS: NORMAL
MICRO REPORT STATUS: NORMAL
SPECIMEN SOURCE: NORMAL
SPECIMEN SOURCE: NORMAL

## 2017-05-05 ENCOUNTER — NURSING HOME VISIT (OUTPATIENT)
Dept: GERIATRICS | Facility: CLINIC | Age: 77
End: 2017-05-05
Payer: COMMERCIAL

## 2017-05-05 VITALS
TEMPERATURE: 98.7 F | SYSTOLIC BLOOD PRESSURE: 100 MMHG | DIASTOLIC BLOOD PRESSURE: 71 MMHG | WEIGHT: 185 LBS | HEART RATE: 96 BPM | BODY MASS INDEX: 30.79 KG/M2 | RESPIRATION RATE: 20 BRPM

## 2017-05-05 DIAGNOSIS — Z79.01 LONG-TERM (CURRENT) USE OF ANTICOAGULANTS: ICD-10-CM

## 2017-05-05 DIAGNOSIS — R53.81 PHYSICAL DECONDITIONING: ICD-10-CM

## 2017-05-05 DIAGNOSIS — I10 BENIGN ESSENTIAL HYPERTENSION: ICD-10-CM

## 2017-05-05 DIAGNOSIS — E78.5 HYPERLIPIDEMIA LDL GOAL <100: ICD-10-CM

## 2017-05-05 DIAGNOSIS — I48.20 CHRONIC ATRIAL FIBRILLATION (H): Primary | ICD-10-CM

## 2017-05-05 DIAGNOSIS — D64.9 ANEMIA, UNSPECIFIED TYPE: ICD-10-CM

## 2017-05-05 DIAGNOSIS — I25.10 CORONARY ARTERY DISEASE INVOLVING NATIVE HEART WITHOUT ANGINA PECTORIS, UNSPECIFIED VESSEL OR LESION TYPE: ICD-10-CM

## 2017-05-05 LAB — INR PPP: 1.6

## 2017-05-05 PROCEDURE — 99207 ZZC CDG-CORRECTLY CODED, REVIEWED AND AGREE: CPT | Performed by: NURSE PRACTITIONER

## 2017-05-05 PROCEDURE — 99309 SBSQ NF CARE MODERATE MDM 30: CPT | Performed by: NURSE PRACTITIONER

## 2017-05-05 NOTE — PROGRESS NOTES
[facility]    Chief Complaint   Patient presents with     Nursing Home Acute       HPI:    Kathryn Banks is a 77 year old  (1940), who is being seen today for an episodic care visit at [facility] ([facility] . Today's concern is:       Chronic atrial fibrillation (H)  Physical deconditioning  Coronary artery disease involving native heart without angina pectoris, unspecified vessel or lesion type  Anemia, unspecified type  Benign essential hypertension  Long-term (current) use of anticoagulants  Hyperlipidemia LDL goal <100       ALLERGIES: Ciprofloxacin; Oxycodone; Lisinopril; Penicillins; and Sulfa drugs  Past Medical, Surgical, Family and Social History reviewed and updated in EPIC.    Current Outpatient Prescriptions   Medication Sig Dispense Refill     AMIODARONE HCL PO Take 200 mg by mouth daily       warfarin (COUMADIN) 2.5 MG tablet as directed by the coumadin clinic 180 tablet 0     insulin aspart (NOVOLOG PEN) 100 UNIT/ML injection Inject 1-7 Units Subcutaneous 3 times daily (with meals) Correction Scale - MEDIUM INSULIN RESISTANCE DOSING     Do Not give Correction Insulin if BG < 140.  For  - 189 give 1 unit.  For  - 239 give 2 units.  For  - 289 give 3 units.  For  - 339 give 4 units.  For BG = or > 340 give 5 units.  Check blood glucose before meals/bolus feedings and administer based on blood glucose.  Notify MD if glu > or = 350       insulin aspart (NOVOLOG PEN) 100 UNIT/ML injection Inject 1-5 Units Subcutaneous At Bedtime Bedtime Blood Glucose (BG)  For  - 249 give 1 units.   For  - 299 give 2 units.   For  - 349 give 3 units.  For - 399 give 4 units.   For BG = or > 400 give 5 units.       pramipexole (MIRAPEX) 1 MG tablet TAKE 3 TABLETS BY MOUTH EVERY EVENING 270 tablet 2     metoprolol (LOPRESSOR) 50 MG tablet Take 1 tablet (50 mg) by mouth 2 times daily 60 tablet      cefdinir (OMNICEF) 300 MG capsule Take 1 capsule  (300 mg) by mouth daily for 7 days  0     furosemide (LASIX) 40 MG tablet 40 mg a day 90 tablet 3     cyanocobalamin (VITAMIN B12) 1000 MCG/ML injection Inject 1 mL (1,000 mcg) into the muscle every 30 days 1 mL 11     isosorbide mononitrate (IMDUR) 30 MG 24 hr tablet Take 3 tablets (90 mg) by mouth daily 30 tablet 2     gabapentin (NEURONTIN) 300 MG capsule Take 1 capsule (300 mg) by mouth 2 times daily 180 capsule 1     ferrous sulfate (IRON) 325 (65 FE) MG tablet Take 1 tablet (325 mg) by mouth daily (with breakfast) 100 tablet      calcium carbonate-vitamin D 500-400 MG-UNIT TABS tablt Take 1 tablet by mouth 3 times daily 180 tablet 3     nitroglycerin (NITROSTAT) 0.4 MG SL tablet Place 1 tablet under the tongue See Admin Instructions. for chest pain 25 tablet 0     ORDER FOR DME Equipment being ordered: cpap machine, mask, humidifier, tubing and filters 1 Device 0     Medications reviewed:  Medications reconciled to facility chart and changes were made to reflect current medications as identified as above med list. Below are the changes that were made:   Medications stopped since last EPIC medication reconciliation:   There are no discontinued medications.    Medications started since last [facility] medication reconciliation:  No orders of the defined types were placed in this encounter.    REVIEW OF SYSTEMS:  4 point ROS including Respiratory, CV, GI and , other than that noted in the HPI,  is negative    Physical Exam:  /71  Pulse 96  Temp 98.7  F (37.1  C)  Resp 20  Wt 185 lb (83.9 kg)  BMI 30.79 kg/m2  GENERAL APPEARANCE:  Alert, in no distress    Recent Labs:    CBC RESULTS:   Recent Labs   Lab Test  05/01/17   0518  04/30/17   0536  04/29/17   0537   WBC   --   8.3  12.9*   RBC   --   3.66*  3.81   HGB  8.8*  8.7*  9.0*   HCT   --   30.1*  31.5*   MCV   --   82  83   MCH   --   23.8*  23.6*   MCHC   --   28.9*  28.6*   RDW   --   19.2*  19.0*   PLT   --   246  246       Last Basic Metabolic  Panel:  Recent Labs   Lab Test  05/02/17   0530  05/01/17   0518   NA  147*  146*   POTASSIUM  4.3  4.7   CHLORIDE  121*  122*   MALICK  7.3*  7.2*   CO2  16*  12*   BUN  37*  48*   CR  1.71*  1.93*   GLC  147*  174*       Liver Function Studies -   Recent Labs   Lab Test  04/28/17   1050  03/31/17   1510   PROTTOTAL  5.9*  5.5*   ALBUMIN  2.6*  2.5*   BILITOTAL  0.3  0.3   ALKPHOS  242*  263*   AST  13  17   ALT  23  19       TSH   Date Value Ref Range Status   04/28/2017 0.75 0.40 - 4.00 mU/L Final   01/10/2017 1.03 0.40 - 4.00 mU/L Final   ]    Lab Results   Component Value Date    A1C 6.2 03/31/2017    A1C 6.6 07/03/2016       Lab Results   Component Value Date    INR 1.6 05/05/2017    INR 2.44 05/02/2017    INR 3.41 05/01/2017    INR 4.64 04/30/2017    INR 5.13 04/29/2017    INR 3.07 04/28/2017    INR 2.0 04/26/2017    INR 1.9 04/18/2017    INR 2.6 04/11/2017    INR 4.0 04/07/2017    INR 1.5 04/04/2017    INR 1.6 03/31/2017    INR 1.3 03/28/2017         Assessment/Plan:  Chronic atrial fibrillation (H)-   Amiodorone started on 5/3 for HR 90 - 140's at TCU and she was symptomatic with shortness of breath.  Chart reivewed and Diltiazem IV was not effective in controlling her HR and she was started on IV amio and then transitioned to oral amiodarone,  HR is better controlled today  - continue metoprolol 50 mg PO twice daily at 8am and 8pm  - consult placed to cardiology in the next few business days.   - INR today 1.6 2.5 mg po coumadin every day check INR on Monday     Physical deconditioning  Due to recent sepsis and hospital stay. Currently at TCU for Physical Therapy And Ocupational Therapy goal is to return home with her . She thinks she is ready to go home soon. SW updated and will arrange care conference.   - continue Physical Therapy / Ocupational Therapy     Coronary artery disease involving native heart without angina pectoris, unspecified vessel or lesion type  Denies chest pain. On imdur, lasix,  metoprolol and simvastatin  - no changes    Anemia, unspecified type  Hemoglobin   Date Value Ref Range Status   05/01/2017 8.8 (L) 11.7 - 15.7 g/dL Final   on iron supplements. Recent decrease in hgb due to sepsis.   - recheck today pending.     Benign essential hypertension  BP goal is < 150/80mmHg.  To avoid risk of hypotension, falls, dizziness and tissue hypoperfusion.  BP Readings from Last 6 Encounters:   05/05/17 100/71   05/04/17 (!) 143/91   05/02/17 133/88   03/31/17 126/76   03/03/17 (!) 156/94   01/10/17 144/86       Controlled: yes  Medications: metoprolol (dose reduced at hospital) lasix, imdur,   Lab Results   Component Value Date    CR 1.71 05/02/2017     Plan: recheck with subseq visits      Hyperlipidemia  Simvastatin d/c'd due to interaction with amiodarone.  Due to ASCVD 10 year risk of 46.7%, statin is recommended.  Last LDL was 47 (4/16).  AST 13 U/L on 4/28/17.  Will start pravastatin d/t less interaction with amiodarone.  -start pravastatin 40 mg PO HS   -no grapefruit juice     Orders:  1. DC simvastatin  2. Start pravavastatin 40 mg po HS  3. Warfarin 2.5 mg PO QD  4. Check INR on Monday     Electronically signed by  LACY Penaloza    Provider Disclosure:  This patient was seen along with a nurse practitioner student. The histories and reviews of systems were obtained by them and confirmed by myself. All objective, assessments and plans were completed by myself.    Electronically signed by:  Hortensia Alcala NP

## 2017-05-08 DIAGNOSIS — I48.91 ATRIAL FIBRILLATION, UNSPECIFIED TYPE (H): Primary | ICD-10-CM

## 2017-05-08 LAB — INR PPP: 1.6

## 2017-05-09 VITALS
HEART RATE: 94 BPM | RESPIRATION RATE: 20 BRPM | BODY MASS INDEX: 28.62 KG/M2 | SYSTOLIC BLOOD PRESSURE: 132 MMHG | OXYGEN SATURATION: 94 % | WEIGHT: 172 LBS | DIASTOLIC BLOOD PRESSURE: 82 MMHG | TEMPERATURE: 97.7 F

## 2017-05-10 ENCOUNTER — NURSING HOME VISIT (OUTPATIENT)
Dept: FAMILY MEDICINE | Facility: OTHER | Age: 77
End: 2017-05-10
Payer: COMMERCIAL

## 2017-05-10 DIAGNOSIS — I48.91 ATRIAL FIBRILLATION WITH RAPID VENTRICULAR RESPONSE (H): ICD-10-CM

## 2017-05-10 DIAGNOSIS — E11.59 TYPE 2 DIABETES MELLITUS WITH OTHER CIRCULATORY COMPLICATIONS (H): ICD-10-CM

## 2017-05-10 DIAGNOSIS — I25.10 ATHEROSCLEROSIS OF NATIVE CORONARY ARTERY OF NATIVE HEART WITHOUT ANGINA PECTORIS: ICD-10-CM

## 2017-05-10 DIAGNOSIS — R65.20 SEVERE SEPSIS WITH ACUTE ORGAN DYSFUNCTION DUE TO GRAM NEGATIVE BACTERIA (H): ICD-10-CM

## 2017-05-10 DIAGNOSIS — A41.50 SEVERE SEPSIS WITH ACUTE ORGAN DYSFUNCTION DUE TO GRAM NEGATIVE BACTERIA (H): ICD-10-CM

## 2017-05-10 DIAGNOSIS — I10 ESSENTIAL HYPERTENSION WITH GOAL BLOOD PRESSURE LESS THAN 140/90: ICD-10-CM

## 2017-05-10 PROCEDURE — 99305 1ST NF CARE MODERATE MDM 35: CPT | Performed by: FAMILY MEDICINE

## 2017-05-10 PROCEDURE — 99207 ZZC CDG-CORRECTLY CODED, REVIEWED AND AGREE: CPT | Performed by: FAMILY MEDICINE

## 2017-05-10 NOTE — MR AVS SNAPSHOT
After Visit Summary   5/10/2017    Kathryn Banks    MRN: [id number]           Patient Information     Date Of Birth          1940        Visit Information        Provider Department      5/10/2017 11:59 AM Tala Cisneros MD [facility]        Today's Diagnoses     Atrial fibrillation with rapid ventricular response (H)        Essential hypertension with goal blood pressure less than 140/90        Atherosclerosis of native coronary artery of native heart without angina pectoris        Type 2 diabetes mellitus with other circulatory complications (H)        Severe sepsis with acute organ dysfunction due to Gram negative bacteria (H)           Follow-ups after your visit        Your next 10 appointments already scheduled     May 15, 2017  2:00 PM CDT   [facility]    [address] [place] [id number] [phone]              Who to contact     If you have questions or need follow up information about today's clinic visit or your schedule please contact [facility] directly at [phone].  Normal or non-critical lab and imaging results will be communicated to you by MyChart, letter or phone within 4 business days after the clinic has received the results. If you do not hear from us within 7 days, please contact the clinic through [employer] or phone. If you have a critical or abnormal lab result, we will notify you by phone as soon as possible.  Submit refill requests through [employer] or call your pharmacy and they will forward the refill request to us. Please allow 3 business days for your refill to be completed.          Additional Information About Your Visit        MyChart Information     [employer] gives you secure access to your electronic health record. If you see a primary care provider, you can also send messages to your care team and make appointments. If  you have questions, please call your primary care clinic.  If you do not have a primary care provider, please call [phone] and they will assist you.        Care EveryWhere ID     This is your Care EveryWhere ID. This could be used by other organizations to access your [facility] medical records  [id number]        Your Vitals Were     Pulse Temperature Respirations Pulse Oximetry BMI (Body Mass Index)       94 97.7  F (36.5  C) 20 94% 28.62 kg/m2        Blood Pressure from Last 3 Encounters:   05/15/17 (!) 131/131   05/09/17 132/82   05/05/17 100/71    Weight from Last 3 Encounters:   05/15/17 180 lb 9.6 oz (81.9 kg)   05/09/17 172 lb (78 kg)   05/05/17 185 lb (83.9 kg)              Today, you had the following     No orders found for display       Primary Care Provider Office Phone # Fax #    Dheeraj Jj [phone]       [facility] [address] [place] [address]        Thank you!     Thank you for choosing [facility]  for your care. Our goal is always to provide you with excellent care. Hearing back from our patients is one way we can continue to improve our services. Please take a few minutes to complete the written survey that you may receive in the mail after your visit with us. Thank you!             Your Updated Medication List - Protect others around you: Learn how to safely use, store and throw away your medicines at www.disposemymeds.org.          This list is accurate as of: 5/10/17 11:59 PM.  Always use your most recent med list.                   Brand Name Dispense Instructions for use    AMIODARONE HCL PO      Take 200 mg by mouth daily       calcium carbonate-vitamin D 500-400 MG-UNIT Tabs per tablet     180 tablet    Take 1 tablet by mouth 3 times daily       cyanocobalamin 1000 MCG/ML injection    VITAMIN B12    1 mL    Inject 1 mL (1,000 mcg) into the muscle every 30 days       ferrous sulfate 325 (65 FE) MG tablet    IRON    100 tablet     Take 1 tablet (325 mg) by mouth daily (with breakfast)       furosemide 40 MG tablet    LASIX    90 tablet    40 mg a day       isosorbide mononitrate 30 MG 24 hr tablet    IMDUR    30 tablet    Take 3 tablets (90 mg) by mouth daily       metoprolol 50 MG tablet    LOPRESSOR    60 tablet    Take 1 tablet (50 mg) by mouth 2 times daily       nitroglycerin 0.4 MG sublingual tablet    NITROSTAT    25 tablet    Place 1 tablet under the tongue See Admin Instructions. for chest pain       order for DME     1 Device    Equipment being ordered: cpap machine, mask, humidifier, tubing and filters       pramipexole 1 MG tablet    MIRAPEX    270 tablet    TAKE 3 TABLETS BY MOUTH EVERY EVENING

## 2017-05-11 LAB — INR PPP: 3.3

## 2017-05-12 LAB — INR PPP: 3.4

## 2017-05-15 ENCOUNTER — OFFICE VISIT (OUTPATIENT)
Dept: CARDIOLOGY | Facility: CLINIC | Age: 77
End: 2017-05-15
Payer: COMMERCIAL

## 2017-05-15 ENCOUNTER — DISCHARGE SUMMARY NURSING HOME (OUTPATIENT)
Dept: GERIATRICS | Facility: CLINIC | Age: 77
End: 2017-05-15
Payer: COMMERCIAL

## 2017-05-15 VITALS
TEMPERATURE: 99.5 F | WEIGHT: 180.6 LBS | HEART RATE: 100 BPM | DIASTOLIC BLOOD PRESSURE: 72 MMHG | RESPIRATION RATE: 18 BRPM | SYSTOLIC BLOOD PRESSURE: 126 MMHG | BODY MASS INDEX: 30.05 KG/M2

## 2017-05-15 VITALS
SYSTOLIC BLOOD PRESSURE: 132 MMHG | HEART RATE: 124 BPM | HEIGHT: 64 IN | BODY MASS INDEX: 31.58 KG/M2 | OXYGEN SATURATION: 96 % | DIASTOLIC BLOOD PRESSURE: 80 MMHG | WEIGHT: 185 LBS

## 2017-05-15 DIAGNOSIS — R53.81 PHYSICAL DECONDITIONING: ICD-10-CM

## 2017-05-15 DIAGNOSIS — I48.91 ATRIAL FIBRILLATION WITH RAPID VENTRICULAR RESPONSE (H): ICD-10-CM

## 2017-05-15 DIAGNOSIS — D64.9 ANEMIA, UNSPECIFIED TYPE: ICD-10-CM

## 2017-05-15 DIAGNOSIS — I48.20 CHRONIC ATRIAL FIBRILLATION (H): Primary | ICD-10-CM

## 2017-05-15 DIAGNOSIS — I27.20 PULMONARY HYPERTENSION (H): ICD-10-CM

## 2017-05-15 DIAGNOSIS — I25.10 CORONARY ARTERY DISEASE INVOLVING NATIVE HEART WITHOUT ANGINA PECTORIS, UNSPECIFIED VESSEL OR LESION TYPE: ICD-10-CM

## 2017-05-15 DIAGNOSIS — E78.5 HYPERLIPIDEMIA LDL GOAL <100: ICD-10-CM

## 2017-05-15 DIAGNOSIS — I10 BENIGN ESSENTIAL HYPERTENSION: ICD-10-CM

## 2017-05-15 DIAGNOSIS — I48.92 ATRIAL FLUTTER, UNSPECIFIED TYPE (H): Primary | ICD-10-CM

## 2017-05-15 DIAGNOSIS — G93.40 ACUTE ENCEPHALOPATHY: ICD-10-CM

## 2017-05-15 DIAGNOSIS — Z79.01 LONG-TERM (CURRENT) USE OF ANTICOAGULANTS: ICD-10-CM

## 2017-05-15 DIAGNOSIS — G25.81 RESTLESS LEGS SYNDROME (RLS): ICD-10-CM

## 2017-05-15 DIAGNOSIS — E11.59 TYPE 2 DIABETES MELLITUS WITH OTHER CIRCULATORY COMPLICATIONS (H): ICD-10-CM

## 2017-05-15 PROBLEM — R79.1 SUPRATHERAPEUTIC INR: Status: RESOLVED | Noted: 2017-04-30 | Resolved: 2017-05-15

## 2017-05-15 PROBLEM — B96.89 UTI DUE TO KLEBSIELLA SPECIES: Status: RESOLVED | Noted: 2017-04-28 | Resolved: 2017-05-15

## 2017-05-15 PROBLEM — E87.20 METABOLIC ACIDOSIS, NORMAL ANION GAP (NAG): Status: RESOLVED | Noted: 2017-04-30 | Resolved: 2017-05-15

## 2017-05-15 PROBLEM — N39.0 UTI DUE TO KLEBSIELLA SPECIES: Status: RESOLVED | Noted: 2017-04-28 | Resolved: 2017-05-15

## 2017-05-15 PROBLEM — R65.20 SEVERE SEPSIS WITH ACUTE ORGAN DYSFUNCTION DUE TO GRAM NEGATIVE BACTERIA (H): Status: RESOLVED | Noted: 2017-04-28 | Resolved: 2017-05-15

## 2017-05-15 PROBLEM — A41.50 SEVERE SEPSIS WITH ACUTE ORGAN DYSFUNCTION DUE TO GRAM NEGATIVE BACTERIA (H): Status: RESOLVED | Noted: 2017-04-28 | Resolved: 2017-05-15

## 2017-05-15 LAB — INR PPP: 3

## 2017-05-15 PROCEDURE — 99204 OFFICE O/P NEW MOD 45 MIN: CPT | Mod: 25 | Performed by: INTERNAL MEDICINE

## 2017-05-15 PROCEDURE — 93000 ELECTROCARDIOGRAM COMPLETE: CPT | Performed by: INTERNAL MEDICINE

## 2017-05-15 PROCEDURE — 99207 ZZC CDG-CORRECTLY CODED, REVIEWED AND AGREE: CPT | Performed by: NURSE PRACTITIONER

## 2017-05-15 PROCEDURE — 99316 NF DSCHRG MGMT 30 MIN+: CPT | Performed by: NURSE PRACTITIONER

## 2017-05-15 RX ORDER — AMIODARONE HYDROCHLORIDE 200 MG/1
200 TABLET ORAL DAILY
Qty: 30 TABLET | Refills: 0 | Status: SHIPPED | OUTPATIENT
Start: 2017-05-15 | End: 2017-05-18

## 2017-05-15 RX ORDER — METOPROLOL TARTRATE 50 MG
75 TABLET ORAL 2 TIMES DAILY
Qty: 60 TABLET | Refills: 0 | Status: ON HOLD | OUTPATIENT
Start: 2017-05-15 | End: 2017-06-06

## 2017-05-15 RX ORDER — METOPROLOL TARTRATE 50 MG
50 TABLET ORAL 2 TIMES DAILY
Qty: 60 TABLET | Refills: 0 | Status: SHIPPED | OUTPATIENT
Start: 2017-05-15 | End: 2017-05-15

## 2017-05-15 NOTE — LETTER
5/15/2017    Dheeraj Jj MD  [facility]   [address] [place] [address]    RE: Kathryn Banks       Dear Colleague,    I had the pleasure of seeing Kathryn Banks in the [facility].    Thank you for asking me to see Kathryn Banks in cardiology consultation today.  Kathryn is a 77-year-old female accompanied by her  and her niece today.  She has a history of coronary artery disease with bypass surgery in 2006 as well as a history of chronic stage III kidney disease with a GFR of 25-30, hypertension, type 2 diabetes, dyslipidemia and obstructive sleep apnea.  She also has a history of gastric bypass surgery.  She was asked to see me because of atrial fibrillation.      In February, the patient fell and broke her hip.  Conservative measurements were attempted but she ended up having her hip replaced in April.  She is currently staying in a rehab facility and is planning on going home tomorrow.  She has been exercising on a regular basis, walking about 200 steps before having to sit down and rest.  She rests for a few minutes and then repeats the process.        She was recently seen in the emergency room for fatigue and fever and was found to have Klebsiella sepsis due to UTI and was noted to be in atrial fibrillation with rapid ventricular response.  She was hypotensive but without palpitations.  She was given fluids and started on amiodarone.  She states that since that admission she does not think she has been any more fatigued or dyspneic compared with before that time. She has considerably less stamina than four months ago, but dates her decline back to her fall and hip injuries.  On review of her EKG dated 04/28, it looks like she was in atrial flutter with a rapid ventricular response.  I repeated that study at today and she still appears to have atrial flutter, although flutter waves are less apparent.     Review of old records reveals that she  has had multiple previous episodes of atrial fibrillation, mostly seen in the setting of other medical problems (gastric bypass, colonoscopy, etc.).     At the present time, the patient's main complaint of easy fatigability and shortness of breath with any type of activity and even now occasionally while sitting.  She denies any exertional chest, arm, neck, shoulder or jaw discomfort and has not been experiencing PND or orthopnea.  She has not noticed any significant peripheral edema.  Her and her family have noticed a decrease in her memory, and she reports some peripheral neuropathy.     The last angiogram done in January 2013 showed diffuse moderate CAD with several patent grafts and a closed vein graft to a small distal RCA.  This was the only area of inadequate vascularization.    Outpatient Encounter Prescriptions as of 5/15/2017   Medication Sig Dispense Refill     [DISCONTINUED] GLIPIZIDE XL PO Take 10 mg by mouth daily       [DISCONTINUED] PRAVASTATIN SODIUM PO Take 40 mg by mouth daily       [DISCONTINUED] GABAPENTIN PO Take 300 mg by mouth 2 times daily        [DISCONTINUED] WARFARIN SODIUM PO Give 1mg by mouth Monday and Tuesday. 2.5mg by mouth on Wednesday Recheck INR on Thursday 5/18       [DISCONTINUED] metoprolol (LOPRESSOR) 50 MG tablet Take 1.5 tablets (75 mg) by mouth 2 times daily 60 tablet 0     [DISCONTINUED] AMIODARONE HCL PO Take 200 mg by mouth daily       [DISCONTINUED] pramipexole (MIRAPEX) 1 MG tablet TAKE 3 TABLETS BY MOUTH EVERY EVENING 270 tablet 2     [DISCONTINUED] metoprolol (LOPRESSOR) 50 MG tablet Take 1 tablet (50 mg) by mouth 2 times daily 60 tablet      [DISCONTINUED] furosemide (LASIX) 40 MG tablet 40 mg a day (Patient taking differently: 80 mg daily =40 mg a day) 90 tablet 3     cyanocobalamin (VITAMIN B12) 1000 MCG/ML injection Inject 1 mL (1,000 mcg) into the muscle every 30 days 1 mL 11     [DISCONTINUED] isosorbide mononitrate (IMDUR) 30 MG 24 hr tablet Take 3 tablets (90  mg) by mouth daily 30 tablet 2     [DISCONTINUED] ferrous sulfate (IRON) 325 (65 FE) MG tablet Take 1 tablet (325 mg) by mouth daily (with breakfast) 100 tablet      [DISCONTINUED] calcium carbonate-vitamin D 500-400 MG-UNIT TABS tablt Take 1 tablet by mouth 3 times daily (Patient taking differently: Take 1 tablet by mouth once ) 180 tablet 3     ORDER FOR DME Equipment being ordered: cpap machine, mask, humidifier, tubing and filters 1 Device 0     [DISCONTINUED] nitroglycerin (NITROSTAT) 0.4 MG SL tablet Place 1 tablet under the tongue See Admin Instructions. for chest pain 25 tablet 0     No facility-administered encounter medications on file as of 5/15/2017.       ASSESSMENT AND PLAN:     1.  Ms. Banks appears to have atrial flutter and has been fully anticoagulated for more than a year.  I talked to her about alternatives at this point.  She has not had frequent episodes of atrial fib or flutter and most of them have been triggered by intervening medical events.  She remains in atrial arrhythmia which appears to be flutter at this time and I talked about various alternatives, including continuing to attempt rate control, continuation of antiarrhythmic (she has been started on amiodarone), cardioversion, or an attempt at ablation.  She is very interested in ablation and I believe she may be a candidate, but I deferred that decision to Dr. King.  Dr. King has a free slot in clinic in a couple of days and I will have Ms. Banks see him for further evaluation.  She is fully anticoagulated, which is appropriate.  Her heart rate is rapid and blood pressure acceptable, so I will titrate her metoprolol to 75 mg b.i.d.   2.  History of hypertension, well controlled on current medications.   3.  History of hyperlipidemia, well controlled on Pravachol.   4.  History of coronary artery disease, no symptoms of angina.   5.  History of pulmonary hypertension.  I did not note the very high pulmonary pressures, which were  recorded on the echocardiogram done in 07/2016, until after the patient had left.  We should repeat the echocardiogram to confirm the PA pressure, which appears to be in the mid 70s as of last July.   Arrangements will be made for repeat echocardiogram.   6.  Type II diabetes not on ACE/ARB because of renal issues.  She is on a statin.     Thank you for asking me to participate in Ms. Banks's care.  She will be seeing Dr. King in cardiology consultation for consideration of ablation for atrial flutter.  She is currently on amiodarone, which I did not adjust.  She is fully anticoagulated.  Please do not hesitate to call if I can be of further assistance, and I will plan on having her back in another month for a followup visit.     Again, thank you for allowing me to participate in the care of your patient.      Sincerely,    Rajinder Betancourt MD     [facility]

## 2017-05-15 NOTE — PROGRESS NOTES
[place] [facility] DISCHARGE SUMMARY    PATIENT'S NAME: Kathryn Banks  YOB: 1940  MEDICAL RECORD NUMBER:  [id number]    PRIMARY CARE PROVIDER AND CLINIC RESPONSIBLE AFTER TRANSFER: Dheeraj Jj [facility] [address]  / JEFFERY KRAMER 5*     CODE STATUS/ADVANCE DIRECTIVES DISCUSSION:   CPR/Full code        Allergies   Allergen Reactions     Ciprofloxacin Nausea and Vomiting     Zofran did not help     Oxycodone Visual Disturbance     Delusions, blackouts      Lisinopril Cough     Penicillins Rash     Sulfa Drugs GI Disturbance     LOSS OF TASTE       TRANSFERRING PROVIDERS: Hortensia Alcala NP, Tala Cisneros MD  DATE OF SNF ADMISSION:  May / 02 / 2017  DATE OF SNF (anticipated) DISCHARGE: May / 16 / 2017  DISCHARGE DISPOSITION: Home   Nursing Facility: [facility] stay 4/28/2017 to 5/2/2017.     Condition on Discharge:  Stable.  Function:  Currently participating in walking program, able to walk with rolling walker independently with wheelchair follow.  Cognitive Scores: BIMS 12    Equipment: walker    DISCHARGE DIAGNOSIS:   1. Chronic atrial fibrillation (H)    2. Physical deconditioning    3. Coronary artery disease involving native heart without angina pectoris, unspecified vessel or lesion type    4. Anemia, unspecified type    5. Benign essential hypertension    6. Long-term (current) use of anticoagulants [Z79.01]    7. Hyperlipidemia LDL goal <100    8. Restless legs syndrome (RLS)    9. Type 2 diabetes mellitus with other circulatory complications (H)    10. Acute encephalopathy    11. Atrial fibrillation with rapid ventricular response (H)        HPI Nursing Facility Course:    Chronic atrial fibrillation (H)  Was started on Amiodarone on 5/3 for elevated HR as she was on amiodorone at the hospital..  On Metoprolol 50 mg BID for BP and rate control along with 200 mg amiodarone QD.  Rate improved  but today she had elevated HR again in 140's. She was not symptomatic and after dose of metoprolol rate improved to 112.  On coumadin for clot prevention and INR was therapeutic today at 3.0.  Has cardiology f/u today and will defer to them for cardiac medication adjustments.    Physical deconditioning  Improved with PT/OT and d/c home ambulating independently with walker    Coronary artery disease involving native heart without angina pectoris, unspecified vessel or lesion type  Denies chest pain or sob. On imdur, lasix, metoprolol and simvastatin  - no changes    Anemia, unspecified type  Hemoglobin   Date Value Ref Range Status   05/05/2017 05/01/2017 8.7 (L)  8.8 (L) 11.7 - 15.7 g/dL Final   04/30/2017 8.7 (L) 11.7 - 15.7 g/dL Final   on iron supplements. Recent decrease in hgb due to hx of sepsis.  No dizziness, sob or increase in fatigue.  -Recommend repeat Hgb at f/u with PCP after discharge.    -continue iron supplements      Long-term (current) use of anticoagulants [Z79.01]  On coumadin for clot prevention and INR today was 3.0.    -coumadin 1 mg PO on Monday and Tuesday, 2.5 mg PO Wed with INR recheck on Thursday, 5/18    Benign essential hypertension  BP goal is < 140/80mmHg.   BP Readings from Last 3 Encounters:   05/15/17 132/80   05/14/17 126/72   05/09/17 132/82    Controlled: yes  Medications: metoprolol, imdur, lasix   Lab Results   Component Value Date    CR 1.71 05/02/2017      Plan: continue current medications as prescribed      Hyperlipidemia LDL goal <100  Stable  Simvastatin was changed to pravastatin 40 mg d/t interaction with amiodarone and current ASCVD 10 year risk of 46.7%.  Last LDL was 47 (4/16).  Recommend recheck LDL in a few weeks    Restless legs syndrome (RLS)  Controlled on mirapex 3 mg PO HS.    Type 2 diabetes mellitus with other circulatory complications (H)  On novolog insulin in the hospital.  She was transitioned back to her glipizide 10 mg PO over the past week and BS  have been 100-164 mg/dL which she reports is her baseline.  Lab Results   Component Value Date    A1C 6.2 03/31/2017    A1C 6.6 07/03/2016    A1C 7.1 04/12/2016    A1C 6.0 06/08/2015    A1C 7.0 11/11/2014   -continue glipizide 10 mg PO QD    Acute encephalopathy  Confusion resolved when she recovered from sepsis.        PAST MEDICAL HISTORY:  has a past medical history of Atrial fibrillation (H) (1/17/2007); Carpal tunnel syndrome; Coronary atherosclerosis of native coronary artery (2006); OBSTRUCTIVE SLEEP APNEA; Osteoarthrosis, unspecified whether generalized or localized, unspecified site; Other and combined forms of senile cataract (2000); Other and unspecified hyperlipidemia; RESTLESS LEGS SYNDROME; TENOSYNOV HAND/WRIST NEC (6/30/2006); Type II or unspecified type diabetes mellitus without mention of complication, not stated as uncontrolled (2001); and Unspecified essential hypertension.    DISCHARGE MEDICATIONS:  Current Outpatient Prescriptions   Medication Sig Dispense Refill     GLIPIZIDE XL PO Take 10 mg by mouth daily       PRAVASTATIN SODIUM PO Take 40 mg by mouth daily       GABAPENTIN PO Take 300 mg by mouth daily       WARFARIN SODIUM PO Give 1mg by mouth Monday and Tuesday. 2.5mg by mouth on Wednesday Recheck INR on Thursday 5/18       amiodarone (PACERONE/CODARONE) 200 MG tablet Take 1 tablet (200 mg) by mouth daily 30 tablet 0     pramipexole (MIRAPEX) 1 MG tablet TAKE 3 TABLETS BY MOUTH EVERY EVENING 270 tablet 2     furosemide (LASIX) 40 MG tablet 40 mg a day 90 tablet 3     cyanocobalamin (VITAMIN B12) 1000 MCG/ML injection Inject 1 mL (1,000 mcg) into the muscle every 30 days 1 mL 11     isosorbide mononitrate (IMDUR) 30 MG 24 hr tablet Take 3 tablets (90 mg) by mouth daily 30 tablet 2     ferrous sulfate (IRON) 325 (65 FE) MG tablet Take 1 tablet (325 mg) by mouth daily (with breakfast) 100 tablet      calcium carbonate-vitamin D 500-400 MG-UNIT TABS tablt Take 1 tablet by mouth 3 times daily  180 tablet 3     nitroglycerin (NITROSTAT) 0.4 MG SL tablet Place 1 tablet under the tongue See Admin Instructions. for chest pain (Patient not taking: Reported on 5/15/2017) 25 tablet 0     metoprolol (LOPRESSOR) 50 MG tablet Take 1.5 tablets (75 mg) by mouth 2 times daily 60 tablet 0     ORDER FOR DME Equipment being ordered: cpap machine, mask, humidifier, tubing and filters 1 Device 0       MEDICATION CHANGES/RATIONALE:     Controlled medications sent with patient: not applicable/none     ROS:    4 point ROS including Respiratory, CV, GI and , other than that noted in the HPI,  is negative    Physical Exam:   Vitals: /72  Pulse 100  Temp 99.5  F (37.5  C) (Oral)  Resp 18  Wt 180 lb 9.6 oz (81.9 kg)  BMI 30.05 kg/m2  BMI= Body mass index is 30.05 kg/(m^2).    GENERAL APPEARANCE:  Alert, in no distress  ENT:  Mouth and posterior oropharynx normal, moist mucous membranes  EYES:  EOM normal, conjunctiva and lids normal  RESP:  respiratory effort and palpation of chest normal, lungs clear to auscultation , no respiratory distress  CV:  Palpation and auscultation of heart done , regular rate and rhythm, no murmur, rub, or gallop, +2 pedal pulses, 1+ BLE edema with slight erythema of lower legs and some weeping- unchanged from prior exam      DISCHARGE PLAN:  Occupational Therapy, Physical Therapy, Registered Nurse and Home Health Aide  Patient instructed to follow-up with:  PCP in 7 days    Cardiology appt today    Current [place] scheduled appointments:  Future Appointments  Date Time Provider Department Center   5/15/2017 2:00 PM Rajinder Betancourt MD [facility]       MT referral needed and placed by this provider: No    Pending labs: none  SNF labs   CBC RESULTS:   Recent Labs   Lab Test  05/01/17   0518  04/30/17   0536  04/29/17   0537   WBC   --   8.3  12.9*   RBC   --   3.66*  3.81   HGB  8.8*  8.7*  9.0*   HCT   --   30.1*  31.5*   MCV   --   82  83   MCH   --   23.8*  23.6*    MCHC   --   28.9*  28.6*   RDW   --   19.2*  19.0*   PLT   --   246  246       Last Basic Metabolic Panel:  Recent Labs   Lab Test  05/02/17   0530  05/01/17   0518   NA  147*  146*   POTASSIUM  4.3  4.7   CHLORIDE  121*  122*   MALICK  7.3*  7.2*   CO2  16*  12*   BUN  37*  48*   CR  1.71*  1.93*   GLC  147*  174*       Liver Function Studies -   Recent Labs   Lab Test  04/28/17   1050  03/31/17   1510   PROTTOTAL  5.9*  5.5*   ALBUMIN  2.6*  2.5*   BILITOTAL  0.3  0.3   ALKPHOS  242*  263*   AST  13  17   ALT  23  19       TSH   Date Value Ref Range Status   04/28/2017 0.75 0.40 - 4.00 mU/L Final   01/10/2017 1.03 0.40 - 4.00 mU/L Final   ]    Lab Results   Component Value Date    A1C 6.2 03/31/2017    A1C 6.6 07/03/2016     Lab Results   Component Value Date    INR 3.0 05/15/2017    INR 3.4 05/12/2017    INR 3.3 05/11/2017    INR 1.6 05/08/2017    INR 1.6 05/05/2017    INR 2.44 05/02/2017    INR 3.41 05/01/2017    INR 4.64 04/30/2017    INR 5.13 04/29/2017    INR 3.07 04/28/2017    INR 2.0 04/26/2017    INR 1.9 04/18/2017           Discharge Treatments:     Ok to D/C to home with current meds + treatments.   Scripts for Amino + Warfarin and Metoprolol sent to [facility] PT/OT/RN/HHA   Warfarin 1mg by mouth on Monday and Tuesday. Give  2.5mg by mouth on Wednesday. Recheck INR on Thursday 5/18    TOTAL DISCHARGE TIME:   Greater than 30 minutes  Electronically signed by:  LACY Penaloza    Provider Disclosure:  This patient was seen along with a nurse practitioner student. The histories and reviews of systems were obtained by them and confirmed by myself. All objective, assessments and plans were completed by myself.    Electronically signed by:  Hortensia Alcala NP

## 2017-05-15 NOTE — PROGRESS NOTES
HISTORY OF PRESENT ILLNESS:  Kathryn is a 77 year old female, (1940), admitted to [facility] from [facility]. Hospital stay 4/28 through 5/2. Admitted to this facility for rehab, medical management and nursing care. Current issues are:      Chronic atrial fibrillation (H)  Physical deconditioning  Restless legs syndrome (RLS)  Coronary artery disease involving native heart without angina pectoris, unspecified vessel or lesion type  Anemia, unspecified type  Benign essential hypertension  Type 2 diabetes mellitus with other circulatory complications (H)  Acute encephalopathy       Past Medical History/  Patient Active Problem List    Diagnosis Date Noted     Rash - redness medial thighs 04/29/2017     Priority: Medium     Atrial fibrillation with rapid ventricular response (H) 04/28/2017     Priority: Medium     Essential hypertension with goal blood pressure less than 140/90 09/13/2016     Priority: Medium     Acute kidney injury (H) 07/02/2016     Priority: Medium     Long-term (current) use of anticoagulants [Z79.01] 03/04/2016     Priority: Medium     Type 2 diabetes mellitus with other circulatory complications (H) 01/19/2016     Priority: Medium     Non morbid obesity, unspecified obesity type 11/01/2015     Priority: Medium     Osteoarthritis of hip 04/29/2015     Priority: Medium     Renal failure 03/23/2015     Priority: Medium     Lymphedema 11/10/2014     Priority: Medium     Anemia 11/10/2014     Priority: Medium     Health Care Home 01/20/2014     Priority: Medium     State Tier Level:  Tier 3  Status:  Closed  Care Coordinator:  Pinky Oden if needed in the future.     See Letters for [facility] Care Plan         CAD - NSTEMI, CABG x 5 2007, angio in 2013 with patent grafts other than occluded graft to PL 02/04/2013     Priority: Medium     Hx of non-ST elevation myocardial infarction (NSTEMI) 02/04/2013     Priority: Medium     Hyperlipidemia LDL goal <100  10/31/2010     Priority: Medium     Kidney stone 12/29/2009     Priority: Medium     Edema 01/31/2008     Priority: Medium     Chronic kidney disease, stage III (moderate) 10/17/2007     Priority: Medium     ESOPHAGEAL REFLUX 03/28/2006     Priority: Medium     Lipoma of other skin and subcutaneous tissue 09/07/2004     Priority: Medium     Sleep apnea      Priority: Medium     Problem list name updated by automated process. Provider to review       Advanced directives, counseling/discussion 11/12/2012     Priority: Low     Advance Directive received and scanned. Click on Code in the patient header to view. 11/12/2012          History of peptic ulcer disease 10/17/2007     Priority: Low     Problem list name updated by automated process. Provider to review       Postsurgical aortocoronary bypass status 01/27/2007     Priority: Low     Restless leg syndrome      Priority: Low          Social History     Social History     Marital status:      Spouse name: Urbano Banks     Number of children: 2     Years of education: 13     Occupational History     Ministry coordinator      [employer]     Social History Main Topics     Smoking status: Former Smoker     Years: 10.00     Quit date: 1/1/1969     Smokeless tobacco: Never Used     Alcohol use 0.0 oz/week     0 Standard drinks or equivalent per week      Comment: occasional- 2 drinks per month     Drug use: No     Sexual activity: Yes     Birth control/ protection: Surgical     Other Topics Concern     Parent/Sibling W/ Cabg, Mi Or Angioplasty Before 65f 55m? No     Social History Narrative        Current Outpatient Prescriptions   Medication Sig     GLIPIZIDE XL PO Take 10 mg by mouth daily     PRAVASTATIN SODIUM PO Take 40 mg by mouth daily     GABAPENTIN PO Take 300 mg by mouth daily     WARFARIN SODIUM PO Give 1mg by mouth Monday and Tuesday. 2.5mg by mouth on Wednesday Recheck INR on Thursday 5/18     AMIODARONE HCL PO Take 200 mg  by mouth daily     pramipexole (MIRAPEX) 1 MG tablet TAKE 3 TABLETS BY MOUTH EVERY EVENING     metoprolol (LOPRESSOR) 50 MG tablet Take 1 tablet (50 mg) by mouth 2 times daily     furosemide (LASIX) 40 MG tablet 40 mg a day     cyanocobalamin (VITAMIN B12) 1000 MCG/ML injection Inject 1 mL (1,000 mcg) into the muscle every 30 days     isosorbide mononitrate (IMDUR) 30 MG 24 hr tablet Take 3 tablets (90 mg) by mouth daily     ferrous sulfate (IRON) 325 (65 FE) MG tablet Take 1 tablet (325 mg) by mouth daily (with breakfast)     calcium carbonate-vitamin D 500-400 MG-UNIT TABS tablt Take 1 tablet by mouth 3 times daily     ORDER FOR DME Equipment being ordered: cpap machine, mask, humidifier, tubing and filters     nitroglycerin (NITROSTAT) 0.4 MG SL tablet Place 1 tablet under the tongue See Admin Instructions. for chest pain     No current facility-administered medications for this visit.        Allergies   Allergen Reactions     Ciprofloxacin Nausea and Vomiting     Zofran did not help     Oxycodone Visual Disturbance     Delusions, blackouts      Lisinopril Cough     Penicillins Rash     Sulfa Drugs GI Disturbance     LOSS OF TASTE       I have reviewed the care plan and do agree with the plan.    Information reviewed:  Medications, vital signs, orders, and nursing notes.    ROS:  No chest pain, shortness of breath, fevers, chills, headache, nausea, vomiting, dysuria or bowel abnormalities.  Appetite is  fair.  She is hoping to go home soon.    OBJECTIVE:  /82  Pulse 94  Temp 97.7  F (36.5  C)  Resp 20  Wt 172 lb (78 kg)  SpO2 94%  BMI 28.62 kg/m2  GENERAL APPEARANCE: Alert, in no distress  RESP: respiratory effort normal, no respiratory distress, lungs sounds clear  CV: irregularly irregular rate and rhythm, no murmur, edema +1 pitting bilateral lower extremities   ABDOMEN: normal bowel sounds, soft, nontender, no masses  M/S: observed in wheelchair, no tenderness or swelling of the joints   PSYCH:  insight and judgement, memory appears intact, affect and mood normal    ASSESSMENT/PLAN:    Atrial fibrillation with rapid ventricular response (H)  During hospitalization patient had pulse in the 90-140s and was treated with IV diltiazem and then amiodarone.  Amiodarone was not ordered at discharge, her heart rates were in the 120-130s and amiodarone was restarted at TCU.  She continues on metoprolol as well and heart rates now down in 80- to low 100s.  She remains on warfarin.  She tells me she has had intermittent atrial fibrillation episodes in the past.  She is concerned that she is still in atrial fibrillation and hasn't converted and has multiple questions regarding the ability to get her back in normal sinus rhythm.  Discussed that at this time we are getting her heart rate controlled with medications and continuing her warfarin to prevent clotting.  She has appointment with Cardiology to discuss other options.    Essential hypertension with goal blood pressure less than 140/90  BP controlled.    CAD - NSTEMI, CABG x 5 2007, angio in 2013 with patent grafts other than occluded graft to PL  Currently asymptomatic.  On statin, metoprolol, Imdur, Lasix.    Type 2 diabetes mellitus with other circulatory complications (H)  Lab Results   Component Value Date    A1C 6.2 03/31/2017    A1C 6.6 07/03/2016    A1C 7.1 04/12/2016    A1C 6.0 06/08/2015    A1C 7.0 11/11/2014     Currently on sliding scale insulin since hospital discharge, only getting 1-2 units a day, will be discharging to home soon, previous A1C well controlled, will stop SSI and restart usual glipizide dose.    Severe sepsis with acute organ dysfunction due to Gram negative bacteria (H)  Encephalopathy from the hospital has resolved, she has finished her antibiotics.       Total time spent with patient visit was 25 min including patient visit, review of current treatment plans, and review of past records.     Tala Cisneros MD

## 2017-05-15 NOTE — PROGRESS NOTES
HPI and Plan:   See dictation    Orders Placed This Encounter   Procedures     Follow-Up with Electrophysiologist     Follow-Up with Cardiologist     EKG 12-LEAD CLINIC READ     Echocardiogram     Orders Placed This Encounter   Medications     metoprolol (LOPRESSOR) 50 MG tablet     Sig: Take 1.5 tablets (75 mg) by mouth 2 times daily     Dispense:  60 tablet     Refill:  0     Medications Discontinued During This Encounter   Medication Reason     metoprolol (LOPRESSOR) 50 MG tablet Reorder         Encounter Diagnoses   Name Primary?     Atrial flutter, unspecified type (H) Yes     Atrial fibrillation with rapid ventricular response (H)      Pulmonary hypertension (H)        CURRENT MEDICATIONS:  Current Outpatient Prescriptions   Medication Sig Dispense Refill     GLIPIZIDE XL PO Take 10 mg by mouth daily       PRAVASTATIN SODIUM PO Take 40 mg by mouth daily       GABAPENTIN PO Take 300 mg by mouth daily       WARFARIN SODIUM PO Give 1mg by mouth Monday and Tuesday. 2.5mg by mouth on Wednesday Recheck INR on Thursday 5/18       metoprolol (LOPRESSOR) 50 MG tablet Take 1.5 tablets (75 mg) by mouth 2 times daily 60 tablet 0     [DISCONTINUED] AMIODARONE HCL PO Take 200 mg by mouth daily       pramipexole (MIRAPEX) 1 MG tablet TAKE 3 TABLETS BY MOUTH EVERY EVENING 270 tablet 2     furosemide (LASIX) 40 MG tablet 40 mg a day 90 tablet 3     [DISCONTINUED] metoprolol (LOPRESSOR) 50 MG tablet Take 1 tablet (50 mg) by mouth 2 times daily 60 tablet      cyanocobalamin (VITAMIN B12) 1000 MCG/ML injection Inject 1 mL (1,000 mcg) into the muscle every 30 days 1 mL 11     isosorbide mononitrate (IMDUR) 30 MG 24 hr tablet Take 3 tablets (90 mg) by mouth daily 30 tablet 2     ferrous sulfate (IRON) 325 (65 FE) MG tablet Take 1 tablet (325 mg) by mouth daily (with breakfast) 100 tablet      calcium carbonate-vitamin D 500-400 MG-UNIT TABS tablt Take 1 tablet by mouth 3 times daily 180 tablet 3     amiodarone (PACERONE/CODARONE)  200 MG tablet Take 1 tablet (200 mg) by mouth daily 30 tablet 0     [DISCONTINUED] metoprolol (LOPRESSOR) 50 MG tablet Take 1 tablet (50 mg) by mouth 2 times daily 60 tablet 0     ORDER FOR DME Equipment being ordered: cpap machine, mask, humidifier, tubing and filters 1 Device 0     nitroglycerin (NITROSTAT) 0.4 MG SL tablet Place 1 tablet under the tongue See Admin Instructions. for chest pain (Patient not taking: Reported on 5/15/2017) 25 tablet 0       ALLERGIES     Allergies   Allergen Reactions     Ciprofloxacin Nausea and Vomiting     Zofran did not help     Oxycodone Visual Disturbance     Delusions, blackouts      Lisinopril Cough     Penicillins Rash     Sulfa Drugs GI Disturbance     LOSS OF TASTE       PAST MEDICAL HISTORY:  Past Medical History:   Diagnosis Date     Atrial fibrillation (H) 1/17/2007     Carpal tunnel syndrome      Coronary atherosclerosis of native coronary artery 2006    5 vessel CAD     OBSTRUCTIVE SLEEP APNEA     using CPAP     Osteoarthrosis, unspecified whether generalized or localized, unspecified site     generalized arthritis, particularly in her hands and feet         Other and combined forms of senile cataract 2000    Bilateral     Other and unspecified hyperlipidemia      RESTLESS LEGS SYNDROME      TENOSYNOV HAND/WRIST NEC 6/30/2006     Type II or unspecified type diabetes mellitus without mention of complication, not stated as uncontrolled 2001    Diabetes mellitus/pt is diet controlled with weight loss     Unspecified essential hypertension        PAST SURGICAL HISTORY:  Past Surgical History:   Procedure Laterality Date     ANGIOPLASTY       C APPENDECTOMY       C CABG, VEIN, FIVE  12/06    SVG x 4 and LIMA to LAD     C SOTO W/O FACETEC FORAMOT/DSKC 1/2 VRT SEG, LUMBAR  1968     C REMV CATARACT INTRACAP,INSERT LENS      Bilateral     C TOTAL ABDOM HYSTERECTOMY  1995     - fibroids     C VAGOTOMY/PYLOROPLASTY,SELECT  1970     COLONOSCOPY  12/3/2007     COLONOSCOPY   2014    Procedure: COLONOSCOPY;  Colonoscopy;  Surgeon: Zack Stearns MD;  Location:  GI     CYSTOSCOPY  09    [facility]     ENDOSCOPY  2000    Upper GI     HC COLONOSCOPY THRU STOMA, DIAGNOSTIC  10/7/04    poor prep, repeat in 2-3 years     HC COLONOSCOPY W/WO BRUSH/WASH  10/31/07    Repeat-poor quality prep     HC REMOVAL OF OVARY/TUBE(S)      Salpingo-Oophorectomy, Unilateral     HC REVISE MEDIAN N/CARPAL TUNNEL SURG      Carpal tunnel release     HC UGI ENDOSCOPY DIAG W BIOPSY  10/31/07     HC UGI ENDOSCOPY, SIMPLE EXAM  12/3/2007     OPEN REDUCTION INTERNAL FIXATION HIP NAILING Left 7/3/2016    Procedure: OPEN REDUCTION INTERNAL FIXATION HIP NAILING;  Surgeon: Lake Schulz MD;  Location:  OR       FAMILY HISTORY:  Family History   Problem Relation Age of Onset     DIABETES Father      AODM     Neurologic Disorder Father      Parkinson's     Blood Disease Father       from blood clot from leg to lung immediately after hip fracture     DIABETES Paternal Grandmother      adult onset     DIABETES Maternal Grandmother      adult onset     Hypertension No family hx of      CEREBROVASCULAR DISEASE No family hx of        SOCIAL HISTORY:  Social History     Social History     Marital status:      Spouse name: Urbano Banks     Number of children: 2     Years of education: 13     Occupational History     Ministry coordinator      [employer]     Social History Main Topics     Smoking status: Former Smoker     Years: 10.00     Quit date: 1969     Smokeless tobacco: Never Used     Alcohol use 0.0 oz/week     0 Standard drinks or equivalent per week      Comment: occasional- 2 drinks per month     Drug use: No     Sexual activity: Yes     Birth control/ protection: Surgical     Other Topics Concern     Parent/Sibling W/ Cabg, Mi Or Angioplasty Before 65f 55m? No     Social History Narrative       Review of Systems:  Skin:  Negative     Eyes:   "Negative    ENT:  Negative    Respiratory:  Positive for shortness of breath;dyspnea on exertion;dyspnea at rest  Cardiovascular:  Negative for;palpitations;chest pain edema;dizziness;Positive for;fatigue;exercise intolerance  Gastroenterology:      Genitourinary:  Negative    Musculoskeletal:  Negative    Neurologic:  Positive for numbness or tingling of feet  Psychiatric:  Negative    Heme/Lymph/Imm:  Positive for allergies  Endocrine:  Negative      Physical Exam:  Vitals: /80 (BP Location: Right arm, Patient Position: Fowlers, Cuff Size: Adult Regular)  Pulse 124  Ht 1.626 m (5' 4\")  Wt 83.9 kg (185 lb)  SpO2 96%  BMI 31.76 kg/m2    Constitutional:  cooperative        Skin:  warm and dry to the touch        Head:  normocephalic        Eyes:  no xanthalasma        ENT:  no pallor or cyanosis        Neck:  carotid pulses are full and equal bilaterally, JVP normal, no carotid bruit, no thyromegaly        Chest:  normal breath sounds, clear to auscultation, normal A-P diameter, normal symmetry, normal respiratory excursion, no use of accessory muscles        Cardiac: regular rhythm;normal S1 and S2;no S3 or S4;no murmurs, gallops or rubs detected tachycardic                Abdomen:  abdomen soft, non-tender, BS normoactive, no mass, no HSM, no bruits        Vascular: pulses full and equal                                      Extremities and Back:           Neurological:  affect appropriate, oriented to time, person and place          Recent Lab Results:  LIPID RESULTS:  Lab Results   Component Value Date    CHOL 127 04/12/2016    HDL 65 04/12/2016    LDL 47 04/12/2016    TRIG 73 04/12/2016    CHOLHDLRATIO 1.8 06/08/2015       LIVER ENZYME RESULTS:  Lab Results   Component Value Date    AST 13 04/28/2017    ALT 23 04/28/2017       CBC RESULTS:  Lab Results   Component Value Date    WBC 8.3 04/30/2017    RBC 3.66 (L) 04/30/2017    HGB 8.8 (L) 05/01/2017    HCT 30.1 (L) 04/30/2017    MCV 82 04/30/2017    MCH " 23.8 (L) 04/30/2017    MCHC 28.9 (L) 04/30/2017    RDW 19.2 (H) 04/30/2017     04/30/2017       BMP RESULTS:  Lab Results   Component Value Date     (H) 05/02/2017    POTASSIUM 4.3 05/02/2017    CHLORIDE 121 (H) 05/02/2017    CO2 16 (L) 05/02/2017    ANIONGAP 10 05/02/2017     (H) 05/02/2017    BUN 37 (H) 05/02/2017    CR 1.71 (H) 05/02/2017    GFRESTIMATED 29 (L) 05/02/2017    GFRESTBLACK 35 (L) 05/02/2017    MALICK 7.3 (L) 05/02/2017        A1C RESULTS:  Lab Results   Component Value Date    A1C 6.2 (H) 03/31/2017       INR RESULTS:  Lab Results   Component Value Date    INR 3.0 05/15/2017    INR 3.4 05/12/2017           CC  No referring provider defined for this encounter.

## 2017-05-15 NOTE — MR AVS SNAPSHOT
After Visit Summary   5/15/2017    Kathryn Banks    MRN: [id number]           Patient Information     Date Of Birth          1940        Visit Information        Provider Department      5/15/2017 2:00 PM Rajinder Betancourt MD [facility]        Today's Diagnoses     Atrial flutter, unspecified type (H)    -  1    Atrial fibrillation with rapid ventricular response (H)           Follow-ups after your visit        Additional Services     Follow-Up with Electrophysiologist                 Future tests that were ordered for you today     Open Future Orders        Priority Expected Expires Ordered    Follow-Up with Electrophysiologist Routine 5/17/2017 5/15/2018 5/15/2017            Who to contact     If you have questions or need follow up information about today's clinic visit or your schedule please contact [facility] directly at [phone].  Normal or non-critical lab and imaging results will be communicated to you by [employer], letter or phone within 4 business days after the clinic has received the results. If you do not hear from us within 7 days, please contact the clinic through [employer] or phone. If you have a critical or abnormal lab result, we will notify you by phone as soon as possible.  Submit refill requests through [employer] or call your pharmacy and they will forward the refill request to us. Please allow 3 business days for your refill to be completed.          Additional Information About Your Visit        MyChart Information     [employer] gives you secure access to your electronic health record. If you see a primary care provider, you can also send messages to your care team and make appointments. If you have questions, please call your primary care clinic.  If you do not have a primary care provider, please call [phone] and they will assist you.        Care EveryWhere ID     This is your Care EveryWhere ID. This could be used by other  "organizations to access your [facility] medical records  [id number]        Your Vitals Were     Pulse Height Pulse Oximetry BMI (Body Mass Index)          124 1.626 m (5' 4\") 96% 31.76 kg/m2         Blood Pressure from Last 3 Encounters:   05/15/17 132/80   05/14/17 126/72   05/09/17 132/82    Weight from Last 3 Encounters:   05/15/17 83.9 kg (185 lb)   05/14/17 81.9 kg (180 lb 9.6 oz)   05/09/17 78 kg (172 lb)              We Performed the Following     EKG 12-LEAD CLINIC READ          Today's Medication Changes          These changes are accurate as of: 5/15/17  3:04 PM.  If you have any questions, ask your nurse or doctor.               Start taking these medicines.        Dose/Directions    metoprolol 50 MG tablet   Commonly known as:  LOPRESSOR   Used for:  Atrial fibrillation with rapid ventricular response (H)   Started by:  Rajinder Betancourt MD        Dose:  75 mg   Take 1.5 tablets (75 mg) by mouth 2 times daily   Quantity:  60 tablet   Refills:  0         These medicines have changed or have updated prescriptions.        Dose/Directions    amiodarone 200 MG tablet   Commonly known as:  PACERONE/CODARONE   This may have changed:  medication strength   Used for:  Chronic atrial fibrillation (H)   Changed by:  Hortensia Alcala NP        Dose:  200 mg   Take 1 tablet (200 mg) by mouth daily   Quantity:  30 tablet   Refills:  0       GABAPENTIN PO   This may have changed:  Another medication with the same name was removed. Continue taking this medication, and follow the directions you see here.   Changed by:  Hortensia Alcala NP        Dose:  300 mg   Take 300 mg by mouth daily   Refills:  0       WARFARIN SODIUM PO   This may have changed:  Another medication with the same name was removed. Continue taking this medication, and follow the directions you see here.   Changed by:  Hortensia Alcala NP        Give 1mg by mouth Monday and Tuesday. 2.5mg by mouth on Wednesday Recheck INR on Thursday 5/18   Refills:  0    "      Stop taking these medicines if you haven't already. Please contact your care team if you have questions.     insulin aspart 100 UNIT/ML injection   Commonly known as:  NovoLOG PEN   Stopped by:  Hortensia Alcala NP                Where to get your medicines      These medications were sent to [facility] [address] [place], [place] - [address]  [address], [facility] [address]     Phone:  [phone]     amiodarone 200 MG tablet    metoprolol 50 MG tablet                Primary Care Provider Office Phone # Fax #    Dheeraj Jj [phone]       [facility] [address] [place] [address]        Thank you!     Thank you for choosing [facility]  for your care. Our goal is always to provide you with excellent care. Hearing back from our patients is one way we can continue to improve our services. Please take a few minutes to complete the written survey that you may receive in the mail after your visit with us. Thank you!             Your Updated Medication List - Protect others around you: Learn how to safely use, store and throw away your medicines at www.disposemymeds.org.          This list is accurate as of: 5/15/17  3:04 PM.  Always use your most recent med list.                   Brand Name Dispense Instructions for use    amiodarone 200 MG tablet    PACERONE/CODARONE    30 tablet    Take 1 tablet (200 mg) by mouth daily       calcium carbonate-vitamin D 500-400 MG-UNIT Tabs per tablet     180 tablet    Take 1 tablet by mouth 3 times daily       cyanocobalamin 1000 MCG/ML injection    VITAMIN B12    1 mL    Inject 1 mL (1,000 mcg) into the muscle every 30 days       ferrous sulfate 325 (65 FE) MG tablet    IRON    100 tablet    Take 1 tablet (325 mg) by mouth daily (with breakfast)       furosemide 40 MG tablet    LASIX    90 tablet    40 mg a day       GABAPENTIN PO      Take 300 mg by mouth daily       GLIPIZIDE XL PO      Take 10 mg by mouth daily        isosorbide mononitrate 30 MG 24 hr tablet    IMDUR    30 tablet    Take 3 tablets (90 mg) by mouth daily       metoprolol 50 MG tablet    LOPRESSOR    60 tablet    Take 1.5 tablets (75 mg) by mouth 2 times daily       nitroglycerin 0.4 MG sublingual tablet    NITROSTAT    25 tablet    Place 1 tablet under the tongue See Admin Instructions. for chest pain       order for DME     1 Device    Equipment being ordered: cpap machine, mask, humidifier, tubing and filters       pramipexole 1 MG tablet    MIRAPEX    270 tablet    TAKE 3 TABLETS BY MOUTH EVERY EVENING       PRAVASTATIN SODIUM PO      Take 40 mg by mouth daily       WARFARIN SODIUM PO      Give 1mg by mouth Monday and Tuesday. 2.5mg by mouth on Wednesday Recheck INR on Thursday 5/18

## 2017-05-15 NOTE — ASSESSMENT & PLAN NOTE
During hospitalization patient had pulse in the 90-140s and was treated with IV diltiazem and then amiodarone.  Amiodarone was not ordered at discharge, her heart rates were in the 120-130s and amiodarone was restarted at TCU.  She continues on metoprolol as well and heart rates now down in 80- to low 100s.  She remains on warfarin.  She tells me she has had intermittent atrial fibrillation episodes in the past.  She is concerned that she is still in atrial fibrillation and hasn't converted and has multiple questions regarding the ability to get her back in normal sinus rhythm.  Discussed that at this time we are getting her heart rate controlled with medications and continuing her warfarin to prevent clotting.  She has appointment with Cardiology to discuss other options.

## 2017-05-15 NOTE — LETTER
5/15/2017      RE: Kathryn Banks  [id number]  [facility] [address]       Dear Colleague,    Thank you for the opportunity to participate in the care of your patient, Kathryn Banks, at the [facility]. Please see a copy of my visit note below.    HPI and Plan:   See dictation    Orders Placed This Encounter   Procedures     Follow-Up with Electrophysiologist     Follow-Up with Cardiologist     EKG 12-LEAD CLINIC READ     Echocardiogram     Orders Placed This Encounter   Medications     metoprolol (LOPRESSOR) 50 MG tablet     Sig: Take 1.5 tablets (75 mg) by mouth 2 times daily     Dispense:  60 tablet     Refill:  0     Medications Discontinued During This Encounter   Medication Reason     metoprolol (LOPRESSOR) 50 MG tablet Reorder         Encounter Diagnoses   Name Primary?     Atrial flutter, unspecified type (H) Yes     Atrial fibrillation with rapid ventricular response (H)      Pulmonary hypertension (H)        CURRENT MEDICATIONS:  Current Outpatient Prescriptions   Medication Sig Dispense Refill     GLIPIZIDE XL PO Take 10 mg by mouth daily       PRAVASTATIN SODIUM PO Take 40 mg by mouth daily       GABAPENTIN PO Take 300 mg by mouth daily       WARFARIN SODIUM PO Give 1mg by mouth Monday and Tuesday. 2.5mg by mouth on Wednesday Recheck INR on Thursday 5/18       metoprolol (LOPRESSOR) 50 MG tablet Take 1.5 tablets (75 mg) by mouth 2 times daily 60 tablet 0     [DISCONTINUED] AMIODARONE HCL PO Take 200 mg by mouth daily       pramipexole (MIRAPEX) 1 MG tablet TAKE 3 TABLETS BY MOUTH EVERY EVENING 270 tablet 2     furosemide (LASIX) 40 MG tablet 40 mg a day 90 tablet 3     [DISCONTINUED] metoprolol (LOPRESSOR) 50 MG tablet Take 1 tablet (50 mg) by mouth 2 times daily 60 tablet      cyanocobalamin (VITAMIN B12) 1000 MCG/ML injection Inject 1 mL (1,000 mcg) into the muscle every 30 days 1 mL 11     isosorbide mononitrate (IMDUR) 30 MG  24 hr tablet Take 3 tablets (90 mg) by mouth daily 30 tablet 2     ferrous sulfate (IRON) 325 (65 FE) MG tablet Take 1 tablet (325 mg) by mouth daily (with breakfast) 100 tablet      calcium carbonate-vitamin D 500-400 MG-UNIT TABS tablt Take 1 tablet by mouth 3 times daily 180 tablet 3     amiodarone (PACERONE/CODARONE) 200 MG tablet Take 1 tablet (200 mg) by mouth daily 30 tablet 0     [DISCONTINUED] metoprolol (LOPRESSOR) 50 MG tablet Take 1 tablet (50 mg) by mouth 2 times daily 60 tablet 0     ORDER FOR DME Equipment being ordered: cpap machine, mask, humidifier, tubing and filters 1 Device 0     nitroglycerin (NITROSTAT) 0.4 MG SL tablet Place 1 tablet under the tongue See Admin Instructions. for chest pain (Patient not taking: Reported on 5/15/2017) 25 tablet 0       ALLERGIES     Allergies   Allergen Reactions     Ciprofloxacin Nausea and Vomiting     Zofran did not help     Oxycodone Visual Disturbance     Delusions, blackouts      Lisinopril Cough     Penicillins Rash     Sulfa Drugs GI Disturbance     LOSS OF TASTE       PAST MEDICAL HISTORY:  Past Medical History:   Diagnosis Date     Atrial fibrillation (H) 1/17/2007     Carpal tunnel syndrome      Coronary atherosclerosis of native coronary artery 2006    5 vessel CAD     OBSTRUCTIVE SLEEP APNEA     using CPAP     Osteoarthrosis, unspecified whether generalized or localized, unspecified site     generalized arthritis, particularly in her hands and feet         Other and combined forms of senile cataract 2000    Bilateral     Other and unspecified hyperlipidemia      RESTLESS LEGS SYNDROME      TENOSYNOV HAND/WRIST NEC 6/30/2006     Type II or unspecified type diabetes mellitus without mention of complication, not stated as uncontrolled 2001    Diabetes mellitus/pt is diet controlled with weight loss     Unspecified essential hypertension        PAST SURGICAL HISTORY:  Past Surgical History:   Procedure Laterality Date     ANGIOPLASTY       C  APPENDECTOMY       C CABG, VEIN, FIVE      SVG x 4 and LIMA to LAD     C SOTO W/O FACETEC FORAMOT/DSKC  VRT SEG, LUMBAR       C REMV CATARACT INTRACAP,INSERT LENS      Bilateral     C TOTAL ABDOM HYSTERECTOMY       - fibroids     C VAGOTOMY/PYLOROPLASTY,SELECT  1970     COLONOSCOPY  12/3/2007     COLONOSCOPY  2014    Procedure: COLONOSCOPY;  Colonoscopy;  Surgeon: Zack Stearns MD;  Location:  GI     CYSTOSCOPY  09    [facility]     ENDOSCOPY  2000    Upper GI     HC COLONOSCOPY THRU STOMA, DIAGNOSTIC  10/7/04    poor prep, repeat in 2-3 years     HC COLONOSCOPY W/WO BRUSH/WASH  10/31/07    Repeat-poor quality prep     HC REMOVAL OF OVARY/TUBE(S)      Salpingo-Oophorectomy, Unilateral     HC REVISE MEDIAN N/CARPAL TUNNEL SURG      Carpal tunnel release     HC UGI ENDOSCOPY DIAG W BIOPSY  10/31/07     HC UGI ENDOSCOPY, SIMPLE EXAM  12/3/2007     OPEN REDUCTION INTERNAL FIXATION HIP NAILING Left 7/3/2016    Procedure: OPEN REDUCTION INTERNAL FIXATION HIP NAILING;  Surgeon: Lake Schulz MD;  Location:  OR       FAMILY HISTORY:  Family History   Problem Relation Age of Onset     DIABETES Father      AODM     Neurologic Disorder Father      Parkinson's     Blood Disease Father       from blood clot from leg to lung immediately after hip fracture     DIABETES Paternal Grandmother      adult onset     DIABETES Maternal Grandmother      adult onset     Hypertension No family hx of      CEREBROVASCULAR DISEASE No family hx of        SOCIAL HISTORY:  Social History     Social History     Marital status:      Spouse name: Urbano Banks     Number of children: 2     Years of education: 13     Occupational History     Ministry coordinator      [employer]     Social History Main Topics     Smoking status: Former Smoker     Years: 10     Quit date: 1969     Smokeless tobacco: Never Used     Alcohol use 0.0 oz/week     0 Standard drinks or  "equivalent per week      Comment: occasional- 2 drinks per month     Drug use: No     Sexual activity: Yes     Birth control/ protection: Surgical     Other Topics Concern     Parent/Sibling W/ Cabg, Mi Or Angioplasty Before 65f 55m? No     Social History Narrative       Review of Systems:  Skin:  Negative     Eyes:  Negative    ENT:  Negative    Respiratory:  Positive for shortness of breath;dyspnea on exertion;dyspnea at rest  Cardiovascular:  Negative for;palpitations;chest pain edema;dizziness;Positive for;fatigue;exercise intolerance  Gastroenterology:      Genitourinary:  Negative    Musculoskeletal:  Negative    Neurologic:  Positive for numbness or tingling of feet  Psychiatric:  Negative    Heme/Lymph/Imm:  Positive for allergies  Endocrine:  Negative      Physical Exam:  Vitals: /80 (BP Location: Right arm, Patient Position: Fowlers, Cuff Size: Adult Regular)  Pulse 124  Ht 1.626 m (5' 4\")  Wt 83.9 kg (185 lb)  SpO2 96%  BMI 31.76 kg/m2    Constitutional:  cooperative        Skin:  warm and dry to the touch        Head:  normocephalic        Eyes:  no xanthalasma        ENT:  no pallor or cyanosis        Neck:  carotid pulses are full and equal bilaterally, JVP normal, no carotid bruit, no thyromegaly        Chest:  normal breath sounds, clear to auscultation, normal A-P diameter, normal symmetry, normal respiratory excursion, no use of accessory muscles        Cardiac: regular rhythm;normal S1 and S2;no S3 or S4;no murmurs, gallops or rubs detected tachycardic                Abdomen:  abdomen soft, non-tender, BS normoactive, no mass, no HSM, no bruits        Vascular: pulses full and equal                                      Extremities and Back:           Neurological:  affect appropriate, oriented to time, person and place          Recent Lab Results:  LIPID RESULTS:  Lab Results   Component Value Date    CHOL 127 04/12/2016    HDL 65 04/12/2016    LDL 47 04/12/2016    TRIG 73 04/12/2016    " CHOLHDLRATIO 1.8 06/08/2015       LIVER ENZYME RESULTS:  Lab Results   Component Value Date    AST 13 04/28/2017    ALT 23 04/28/2017       CBC RESULTS:  Lab Results   Component Value Date    WBC 8.3 04/30/2017    RBC 3.66 (L) 04/30/2017    HGB 8.8 (L) 05/01/2017    HCT 30.1 (L) 04/30/2017    MCV 82 04/30/2017    MCH 23.8 (L) 04/30/2017    MCHC 28.9 (L) 04/30/2017    RDW 19.2 (H) 04/30/2017     04/30/2017       BMP RESULTS:  Lab Results   Component Value Date     (H) 05/02/2017    POTASSIUM 4.3 05/02/2017    CHLORIDE 121 (H) 05/02/2017    CO2 16 (L) 05/02/2017    ANIONGAP 10 05/02/2017     (H) 05/02/2017    BUN 37 (H) 05/02/2017    CR 1.71 (H) 05/02/2017    GFRESTIMATED 29 (L) 05/02/2017    GFRESTBLACK 35 (L) 05/02/2017    MALICK 7.3 (L) 05/02/2017        A1C RESULTS:  Lab Results   Component Value Date    A1C 6.2 (H) 03/31/2017       INR RESULTS:  Lab Results   Component Value Date    INR 3.0 05/15/2017    INR 3.4 05/12/2017       Please do not hesitate to contact me if you have any questions/concerns.     Sincerely,     Rajinder Betancourt MD        CC  No referring provider defined for this encounter.

## 2017-05-16 NOTE — PROGRESS NOTES
HISTORY OF PRESENT ILLNESS:  Thank you for asking me to see Kathryn Banks in cardiology consultation today.  Kathryn is a 77-year-old female accompanied by her  and her niece today.  She has a history of coronary artery disease with bypass surgery in 2006 as well as a history of chronic stage III kidney disease with a GFR of 25-30, hypertension, type 2 diabetes, dyslipidemia and obstructive sleep apnea.  She also has a history of gastric bypass surgery.  She was asked to see me because of atrial fibrillation.      In February, the patient fell and broke her hip.  Conservative measurements were attempted but she ended up having her hip replaced in April.  She is currently staying in a rehab facility and is planning on going home tomorrow.  She has been exercising on a regular basis, walking about 200 steps before having to sit down and rest.  She rests for a few minutes and then repeats the process.        She was recently seen in the emergency room for fatigue and fever and was found to have Klebsiella sepsis due to UTI and was noted to be in atrial fibrillation with rapid ventricular response.  She was hypotensive but without palpitations.  She was given fluids and started on amiodarone.  She states that since that admission she does not think she has been any more fatigued or dyspneic compared with before that time. She has considerably less stamina than four months ago, but dates her decline back to her fall and hip injuries.  On review of her EKG dated 04/28, it looks like she was in atrial flutter with a rapid ventricular response.  I repeated that study at today and she still appears to have atrial flutter, although flutter waves are less apparent.     Review of old records reveals that she has had multiple previous episodes of atrial fibrillation, mostly seen in the setting of other medical problems (gastric bypass, colonoscopy, etc.).     At the present time, the patient's main complaint of easy  fatigability and shortness of breath with any type of activity and even now occasionally while sitting.  She denies any exertional chest, arm, neck, shoulder or jaw discomfort and has not been experiencing PND or orthopnea.  She has not noticed any significant peripheral edema.  Her and her family have noticed a decrease in her memory, and she reports some peripheral neuropathy.     The last angiogram done in January 2013 showed diffuse moderate CAD with several patent grafts and a closed vein graft to a small distal RCA.  This was the only area of inadequate vascularization.     ASSESSMENT AND PLAN:     1.  Ms. Banks appears to have atrial flutter and has been fully anticoagulated for more than a year.  I talked to her about alternatives at this point.  She has not had frequent episodes of atrial fib or flutter and most of them have been triggered by intervening medical events.  She remains in atrial arrhythmia which appears to be flutter at this time and I talked about various alternatives, including continuing to attempt rate control, continuation of antiarrhythmic (she has been started on amiodarone), cardioversion, or an attempt at ablation.  She is very interested in ablation and I believe she may be a candidate, but I deferred that decision to Dr. King.  Dr. King has a free slot in clinic in a couple of days and I will have Ms. Banks see him for further evaluation.  She is fully anticoagulated, which is appropriate.  Her heart rate is rapid and blood pressure acceptable, so I will titrate her metoprolol to 75 mg b.i.d.   2.  History of hypertension, well controlled on current medications.   3.  History of hyperlipidemia, well controlled on Pravachol.   4.  History of coronary artery disease, no symptoms of angina.   5.  History of pulmonary hypertension.  I did not note the very high pulmonary pressures, which were recorded on the echocardiogram done in 07/2016, until after the patient had left.  We should  repeat the echocardiogram to confirm the PA pressure, which appears to be in the mid 70s as of last July.   Arrangements will be made for repeat echocardiogram.   6.  Type II diabetes not on ACE/ARB because of renal issues.  She is on a statin.     Thank you for asking me to participate in Ms. Banks's care.  She will be seeing Dr. King in cardiology consultation for consideration of ablation for atrial flutter.  She is currently on amiodarone, which I did not adjust.  She is fully anticoagulated.  Please do not hesitate to call if I can be of further assistance, and I will plan on having her back in another month for a followup visit.      cc:      [place] [address]         EMILIE QUAN MD, [facility]             D: 05/15/2017 16:59   T: 2017 10:52   MT: DANIELLE      Name:     MARI BANKS   MRN:      [id number]        Account:      [id number]   :      1940           Service Date: 05/15/2017      Document: [id number]

## 2017-05-17 ENCOUNTER — CARE COORDINATION (OUTPATIENT)
Dept: CARE COORDINATION | Facility: CLINIC | Age: 77
End: 2017-05-17

## 2017-05-17 NOTE — PROGRESS NOTES
Clinic Care Coordination Contact  [facility]/Voicemail    Referral Source: IP/TCU Report  Clinical Data: Care Coordinator Outreach  Outreach attempted x 1.  Left message on voicemail with call back information and requested return call.  Plan:  Care Coordinator will try to reach patient again in 1-2 business days.    Meenakshi Larsen RN,BSN  Clinical Care Coordinator    [facility] [phone]  [facility] [phone]

## 2017-05-18 ENCOUNTER — TELEPHONE (OUTPATIENT)
Dept: INTERNAL MEDICINE | Facility: CLINIC | Age: 77
End: 2017-05-18

## 2017-05-18 ENCOUNTER — ANTICOAGULATION THERAPY VISIT (OUTPATIENT)
Dept: ANTICOAGULATION | Facility: CLINIC | Age: 77
End: 2017-05-18

## 2017-05-18 ENCOUNTER — OFFICE VISIT (OUTPATIENT)
Dept: CARDIOLOGY | Facility: CLINIC | Age: 77
End: 2017-05-18
Payer: COMMERCIAL

## 2017-05-18 VITALS
HEART RATE: 110 BPM | WEIGHT: 181.8 LBS | SYSTOLIC BLOOD PRESSURE: 126 MMHG | DIASTOLIC BLOOD PRESSURE: 70 MMHG | RESPIRATION RATE: 12 BRPM | HEIGHT: 64 IN | BODY MASS INDEX: 31.04 KG/M2 | OXYGEN SATURATION: 98 %

## 2017-05-18 DIAGNOSIS — I48.92 ATRIAL FLUTTER, UNSPECIFIED TYPE (H): ICD-10-CM

## 2017-05-18 DIAGNOSIS — I48.3 TYPICAL ATRIAL FLUTTER (H): Primary | ICD-10-CM

## 2017-05-18 DIAGNOSIS — Z79.01 LONG-TERM (CURRENT) USE OF ANTICOAGULANTS: ICD-10-CM

## 2017-05-18 DIAGNOSIS — I25.2 HX OF NON-ST ELEVATION MYOCARDIAL INFARCTION (NSTEMI): ICD-10-CM

## 2017-05-18 LAB — INR PPP: 1.5

## 2017-05-18 PROCEDURE — 99214 OFFICE O/P EST MOD 30 MIN: CPT | Performed by: INTERNAL MEDICINE

## 2017-05-18 RX ORDER — DILTIAZEM HYDROCHLORIDE 120 MG/1
120 CAPSULE, COATED, EXTENDED RELEASE ORAL DAILY
Qty: 30 CAPSULE | Refills: 3 | Status: SHIPPED | OUTPATIENT
Start: 2017-05-18 | End: 2017-05-24

## 2017-05-18 ASSESSMENT — PAIN SCALES - GENERAL: PAINLEVEL: NO PAIN (0)

## 2017-05-18 NOTE — LETTER
5/18/2017    Dheeraj Jj MD  [facility]   [address] [place] [address]    RE: Kathryn Banks       Dear Colleague,    I had the pleasure of seeing Kathryn Banks in the [facility].    I saw Ms. Banks for evaluation of atrial flutter.  She is a 77-year-old white female with a history of coronary artery disease and previous CABG years ago.  She carried a diagnosis of atrial fibrillation remotely but I could not find EKG of atrial fibrillation through the record.  She has been on warfarin, but her INR has not been consistently therapeutic.      The patient was found to be in atrial flutter with rapid ventricular rate near the end of April.  She was prescribed amiodarone but she only took it for a few weeks and has been off the medicine for the last few days because she has not filled the prescription.  Symptomatically, she feels dizziness with exertion, but she does not feel palpitations.  There is no chest pain at the present time.  She does have some shortness of breath and fatigue.      Her past medical history is remarkable for coronary artery disease with preserved ejection fraction.  The CABG was performed in 2006.  She also has obstructive sleep apnea on CPAP, type 2 diabetes, hypertension, restless legs syndrome and osteoarthritis.      The patient has been having some difficulty for her ambulation and she is using a walker at home.      PHYSICAL EXAMINATION:   VITAL SIGNS:  Blood pressure was 126/70, heart rate 110 beats per minute, body weight 181 pounds.   GENERAL:  She came to the clinic in a wheelchair.   HEENT:  Eyes and ENT were unremarkable.   LUNGS:  Clear.   CARDIAC:  Rhythm was the irregular and heart sounds were normal without murmur.   ABDOMEN:  Examination showed no hepatomegaly.   EXTREMITIES:  There was no pedal edema.      Recent EKGs from 04/28 showed atrial flutter with rapid ventricular rate and frequent PVCs.  The flutter  morphology seemed to be typical.  Her echocardiography from February reported pulmonary hypertension that is considered to be severe.     No facility-administered encounter medications on file as of 5/18/2017.      Outpatient Encounter Prescriptions as of 5/18/2017   Medication Sig Dispense Refill     [DISCONTINUED] diltiazem (CARDIZEM CD) 120 MG 24 hr capsule Take 1 capsule (120 mg) by mouth daily 30 capsule 3     [DISCONTINUED] GLIPIZIDE XL PO Take 10 mg by mouth daily       [DISCONTINUED] PRAVASTATIN SODIUM PO Take 40 mg by mouth daily       [DISCONTINUED] GABAPENTIN PO Take 300 mg by mouth 2 times daily        [DISCONTINUED] WARFARIN SODIUM PO Give 1mg by mouth Monday and Tuesday. 2.5mg by mouth on Wednesday Recheck INR on Thursday 5/18       [DISCONTINUED] metoprolol (LOPRESSOR) 50 MG tablet Take 1.5 tablets (75 mg) by mouth 2 times daily 60 tablet 0     [DISCONTINUED] pramipexole (MIRAPEX) 1 MG tablet TAKE 3 TABLETS BY MOUTH EVERY EVENING 270 tablet 2     [DISCONTINUED] furosemide (LASIX) 40 MG tablet 40 mg a day (Patient taking differently: 80 mg daily =40 mg a day) 90 tablet 3     cyanocobalamin (VITAMIN B12) 1000 MCG/ML injection Inject 1 mL (1,000 mcg) into the muscle every 30 days 1 mL 11     [DISCONTINUED] isosorbide mononitrate (IMDUR) 30 MG 24 hr tablet Take 3 tablets (90 mg) by mouth daily 30 tablet 2     [DISCONTINUED] ferrous sulfate (IRON) 325 (65 FE) MG tablet Take 1 tablet (325 mg) by mouth daily (with breakfast) 100 tablet      [DISCONTINUED] calcium carbonate-vitamin D 500-400 MG-UNIT TABS tablt Take 1 tablet by mouth 3 times daily (Patient taking differently: Take 1 tablet by mouth once ) 180 tablet 3     ORDER FOR DME Equipment being ordered: cpap machine, mask, humidifier, tubing and filters 1 Device 0     [DISCONTINUED] nitroglycerin (NITROSTAT) 0.4 MG SL tablet Place 1 tablet under the tongue See Admin Instructions. for chest pain 25 tablet 0     [DISCONTINUED] amiodarone  (PACERONE/CODARONE) 200 MG tablet Take 1 tablet (200 mg) by mouth daily (Patient not taking: Reported on 5/18/2017) 30 tablet 0      ASSESSMENT AND RECOMMENDATIONS:  Ms. Banks is a 77-year-old white female with a history of coronary artery disease and preserved ejection fraction.  She has new onset atrial flutter with rapid ventricular rate.  The current heart rate is still fast.  I have added diltiazem 120 mg once a day for better rate control.  She will continue metoprolol 75 mg p.o. b.i.d.  Since she has been off amiodarone and her INR is subtherapeutic, I have asked her not to go back on amiodarone.  For management of atrial flutter, I have recommended catheter ablation.  Since the INR is not therapeutic, she would need a BRANDEN before atrial flutter ablation.  The risks and benefits of BRANDEN and atrial flutter ablation were explained to the patient and her daughter today.  They expressed understanding and consented for the procedure.      She may stop diltiazem and reduce the dose of metoprolol to 50 mg p.o. b.i.d. after the atrial flutter ablation.     Again, thank you for allowing me to participate in the care of your patient.      Sincerely,    Thaddeus King MD     [facility]

## 2017-05-18 NOTE — MR AVS SNAPSHOT
Kathryn Banks   5/18/2017   Anticoagulation Therapy Visit    Description:  77 year old female   Provider:  Dheeraj Jj MD   Department:  Ph Anticoag           INR as of 5/18/2017     Today's INR 1.5!      Anticoagulation Summary as of 5/18/2017     INR goal 2.0-3.0   Today's INR 1.5!   Full instructions 5/19: 5 mg; Otherwise 7.5 mg on Fri; 5 mg all other days   Next INR check 5/23/2017    Indications   Long-term (current) use of anticoagulants [Z79.01] [Z79.01]  Atrial fibrillation (H) (Resolved) [I48.91]  Paroxysmal atrial fibrillation (H) (Resolved) [I48.0]         Contact Numbers     Clinic Number:         May 2017 Details    Sun Mon Tue Wed Thu Fri Sat      1               2               3               4               5               6                 7               8               9               10               11               12               13                 14               15               16               17               18      5 mg   See details      19      5 mg         20      5 mg           21      5 mg         22      5 mg         23            24               25               26               27                 28               29               30               31                   Date Details   05/18 This INR check       Date of next INR:  5/23/2017         How to take your warfarin dose     To take:  5 mg Take 2 of the 2.5 mg tablets.

## 2017-05-18 NOTE — MR AVS SNAPSHOT
After Visit Summary   5/18/2017    Kathryn Banks    MRN: [id number]           Patient Information     Date Of Birth          1940        Visit Information        Provider Department      5/18/2017 3:00 PM Thaddeus King MD [facility]        Today's Diagnoses     Typical atrial flutter (H)    -  1    Atrial flutter, unspecified type (H)           Follow-ups after your visit        Your next 10 appointments already scheduled     May 24, 2017 11:00 AM CDT   Office Visit with Dheeraj Jj MD   [facility] ([facility])    [address] [place] [id number] [phone]           Bring a current list of meds and any records pertaining to this visit.  For Physicals, please bring immunization records and any forms needing to be filled out.  Please arrive 10 minutes early to complete paperwork.              Future tests that were ordered for you today     Open Future Orders        Priority Expected Expires Ordered    Transesophageal Echocardiogram Routine 5/25/2017 5/18/2018 5/18/2017    EP Ablation Procedures Routine 5/25/2017 5/18/2018 5/18/2017            Who to contact     If you have questions or need follow up information about today's clinic visit or your schedule please contact [facility] directly at [phone].  Normal or non-critical lab and imaging results will be communicated to you by MyChart, letter or phone within 4 business days after the clinic has received the results. If you do not hear from us within 7 days, please contact the clinic through MyChart or phone. If you have a critical or abnormal lab result, we will notify you by phone as soon as possible.  Submit refill requests through [employer] or call your pharmacy and they will forward the refill request to us. Please allow 3 business days for your refill to be completed.          Additional Information About Your Visit        MyChart Information     [employer]  "gives you secure access to your electronic health record. If you see a primary care provider, you can also send messages to your care team and make appointments. If you have questions, please call your primary care clinic.  If you do not have a primary care provider, please call [phone] and they will assist you.        Care EveryWhere ID     This is your Care EveryWhere ID. This could be used by other organizations to access your [place] medical records  [id number]        Your Vitals Were     Pulse Respirations Height Pulse Oximetry BMI (Body Mass Index)       110 12 1.626 m (5' 4\") 98% 31.21 kg/m2        Blood Pressure from Last 3 Encounters:   05/18/17 126/70   05/15/17 132/80   05/14/17 126/72    Weight from Last 3 Encounters:   05/18/17 82.5 kg (181 lb 12.8 oz)   05/15/17 83.9 kg (185 lb)   05/14/17 81.9 kg (180 lb 9.6 oz)              We Performed the Following     Follow-Up with Electrophysiologist          Today's Medication Changes          These changes are accurate as of: 5/18/17  3:27 PM.  If you have any questions, ask your nurse or doctor.               Start taking these medicines.        Dose/Directions    diltiazem 120 MG 24 hr capsule   Commonly known as:  CARDIZEM CD   Used for:  Typical atrial flutter (H)   Started by:  Thaddeus King MD        Dose:  120 mg   Take 1 capsule (120 mg) by mouth daily   Quantity:  30 capsule   Refills:  3         Stop taking these medicines if you haven't already. Please contact your care team if you have questions.     amiodarone 200 MG tablet   Commonly known as:  PACERONE/CODARONE   Stopped by:  Thaddeus King MD                Where to get your medicines      These medications were sent to [facility] [address] [place] [address] - [address]  [address] [facility] [address]     Phone:  [phone]     diltiazem 120 MG 24 hr capsule                Primary Care Provider Office Phone # Fax #    Dheeraj Jj [phone]       [facility]" [facility] [address] [place] [address]        Thank you!     Thank you for choosing [facility]  for your care. Our goal is always to provide you with excellent care. Hearing back from our patients is one way we can continue to improve our services. Please take a few minutes to complete the written survey that you may receive in the mail after your visit with us. Thank you!             Your Updated Medication List - Protect others around you: Learn how to safely use, store and throw away your medicines at www.disposemymeds.org.          This list is accurate as of: 5/18/17  3:27 PM.  Always use your most recent med list.                   Brand Name Dispense Instructions for use    calcium carbonate-vitamin D 500-400 MG-UNIT Tabs per tablet     180 tablet    Take 1 tablet by mouth 3 times daily       cyanocobalamin 1000 MCG/ML injection    VITAMIN B12    1 mL    Inject 1 mL (1,000 mcg) into the muscle every 30 days       diltiazem 120 MG 24 hr capsule    CARDIZEM CD    30 capsule    Take 1 capsule (120 mg) by mouth daily       ferrous sulfate 325 (65 FE) MG tablet    IRON    100 tablet    Take 1 tablet (325 mg) by mouth daily (with breakfast)       furosemide 40 MG tablet    LASIX    90 tablet    40 mg a day       GABAPENTIN PO      Take 300 mg by mouth daily       GLIPIZIDE XL PO      Take 10 mg by mouth daily       isosorbide mononitrate 30 MG 24 hr tablet    IMDUR    30 tablet    Take 3 tablets (90 mg) by mouth daily       metoprolol 50 MG tablet    LOPRESSOR    60 tablet    Take 1.5 tablets (75 mg) by mouth 2 times daily       nitroglycerin 0.4 MG sublingual tablet    NITROSTAT    25 tablet    Place 1 tablet under the tongue See Admin Instructions. for chest pain       order for DME     1 Device    Equipment being ordered: cpap machine, mask, humidifier, tubing and filters       pramipexole 1 MG tablet    MIRAPEX    270 tablet    TAKE 3 TABLETS BY MOUTH EVERY EVENING        PRAVASTATIN SODIUM PO      Take 40 mg by mouth daily       WARFARIN SODIUM PO      Give 1mg by mouth Monday and Tuesday. 2.5mg by mouth on Wednesday Recheck INR on Thursday 5/18

## 2017-05-18 NOTE — TELEPHONE ENCOUNTER
Reason for Call:  INR    Who is calling?  Home Care: [facility]ine  Phone number:  [phone]    Fax number:      Name of caller: Janine    INR Value:  1.5    Are there any other concerns:  No    Can we leave a detailed message on this number? YES    Phone number patient can be reached at: Other phone number:  [phone]      Call taken on 5/18/2017 at 12:01 PM by Marli Belle

## 2017-05-18 NOTE — LETTER
5/18/2017      RE: Kathryn Banks  [id number]  [facility] [address]       Dear Colleague,    Thank you for the opportunity to participate in the care of your patient, Kathryn Banks, at the [facility]. Please see a copy of my visit note below.    HPI and Plan:     Orders Placed This Encounter   Procedures     EP Ablation Procedures     Transesophageal Echocardiogram       Orders Placed This Encounter   Medications     diltiazem (CARDIZEM CD) 120 MG 24 hr capsule     Sig: Take 1 capsule (120 mg) by mouth daily     Dispense:  30 capsule     Refill:  3       Medications Discontinued During This Encounter   Medication Reason     amiodarone (PACERONE/CODARONE) 200 MG tablet          Encounter Diagnoses   Name Primary?     Atrial flutter, unspecified type (H)      Typical atrial flutter (H) Yes       CURRENT MEDICATIONS:  Current Outpatient Prescriptions   Medication Sig Dispense Refill     diltiazem (CARDIZEM CD) 120 MG 24 hr capsule Take 1 capsule (120 mg) by mouth daily 30 capsule 3     GLIPIZIDE XL PO Take 10 mg by mouth daily       PRAVASTATIN SODIUM PO Take 40 mg by mouth daily       GABAPENTIN PO Take 300 mg by mouth daily       WARFARIN SODIUM PO Give 1mg by mouth Monday and Tuesday. 2.5mg by mouth on Wednesday Recheck INR on Thursday 5/18       metoprolol (LOPRESSOR) 50 MG tablet Take 1.5 tablets (75 mg) by mouth 2 times daily 60 tablet 0     pramipexole (MIRAPEX) 1 MG tablet TAKE 3 TABLETS BY MOUTH EVERY EVENING 270 tablet 2     furosemide (LASIX) 40 MG tablet 40 mg a day 90 tablet 3     cyanocobalamin (VITAMIN B12) 1000 MCG/ML injection Inject 1 mL (1,000 mcg) into the muscle every 30 days 1 mL 11     isosorbide mononitrate (IMDUR) 30 MG 24 hr tablet Take 3 tablets (90 mg) by mouth daily 30 tablet 2     ferrous sulfate (IRON) 325 (65 FE) MG tablet Take 1 tablet (325 mg) by mouth daily (with breakfast) 100 tablet      calcium  carbonate-vitamin D 500-400 MG-UNIT TABS tablt Take 1 tablet by mouth 3 times daily 180 tablet 3     ORDER FOR DME Equipment being ordered: cpap machine, mask, humidifier, tubing and filters 1 Device 0     nitroglycerin (NITROSTAT) 0.4 MG SL tablet Place 1 tablet under the tongue See Admin Instructions. for chest pain 25 tablet 0       ALLERGIES     Allergies   Allergen Reactions     Ciprofloxacin Nausea and Vomiting     Zofran did not help     Oxycodone Visual Disturbance     Delusions, blackouts      Lisinopril Cough     Penicillins Rash     Sulfa Drugs GI Disturbance     LOSS OF TASTE       PAST MEDICAL HISTORY:  Past Medical History:   Diagnosis Date     Carpal tunnel syndrome      Coronary atherosclerosis of native coronary artery 2006    5 vessel CABG     OBSTRUCTIVE SLEEP APNEA     using CPAP     Osteoarthrosis, unspecified whether generalized or localized, unspecified site     generalized arthritis, particularly in her hands and feet         Other and combined forms of senile cataract 2000    Bilateral     Other and unspecified hyperlipidemia      RESTLESS LEGS SYNDROME      TENOSYNOV HAND/WRIST NEC 6/30/2006     Type II or unspecified type diabetes mellitus without mention of complication, not stated as uncontrolled 2001    Diabetes mellitus/pt is diet controlled with weight loss     Typical atrial flutter (H) 01/17/2007     Unspecified essential hypertension        PAST SURGICAL HISTORY:  Past Surgical History:   Procedure Laterality Date     ANGIOPLASTY       C APPENDECTOMY       C CABG, VEIN, FIVE  12/06    SVG x 4 and LIMA to LAD     C SOTO W/O FACETEC FORAMOT/DSKC 1/2 VRT SEG, LUMBAR  1968     C REMV CATARACT INTRACAP,INSERT LENS      Bilateral     C TOTAL ABDOM HYSTERECTOMY  1995     - fibroids     C VAGOTOMY/PYLOROPLASTY,SELECT  1970     COLONOSCOPY  12/3/2007     COLONOSCOPY  1/17/2014    Procedure: COLONOSCOPY;  Colonoscopy;  Surgeon: Zack Stearns MD;  Location:  GI     CYSTOSCOPY  11/25/09     [facility]     ENDOSCOPY  2000    Upper GI     HC COLONOSCOPY THRU STOMA, DIAGNOSTIC  10/7/04    poor prep, repeat in 2-3 years     HC COLONOSCOPY W/WO BRUSH/WASH  10/31/07    Repeat-poor quality prep     HC REMOVAL OF OVARY/TUBE(S)      Salpingo-Oophorectomy, Unilateral     HC REVISE MEDIAN N/CARPAL TUNNEL SURG      Carpal tunnel release     HC UGI ENDOSCOPY DIAG W BIOPSY  10/31/07     HC UGI ENDOSCOPY, SIMPLE EXAM  12/3/2007     OPEN REDUCTION INTERNAL FIXATION HIP NAILING Left 7/3/2016    Procedure: OPEN REDUCTION INTERNAL FIXATION HIP NAILING;  Surgeon: Lake Schulz MD;  Location: [facility] OR       FAMILY HISTORY:  Family History   Problem Relation Age of Onset     DIABETES Father      AODM     Neurologic Disorder Father      Parkinson's     Blood Disease Father       from blood clot from leg to lung immediately after hip fracture     DIABETES Paternal Grandmother      adult onset     DIABETES Maternal Grandmother      adult onset     Hypertension No family hx of      CEREBROVASCULAR DISEASE No family hx of        SOCIAL HISTORY:  Social History     Social History     Marital status:      Spouse name: Urbano Banks     Number of children: 2     Years of education: 13     Occupational History     Ministry coordinator      [employer]     Social History Main Topics     Smoking status: Former Smoker     Years: 10.     Quit date: 1969     Smokeless tobacco: Never Used     Alcohol use 0.0 oz/week     0 Standard drinks or equivalent per week      Comment: occasional- 2 drinks per month     Drug use: No     Sexual activity: Yes     Birth control/ protection: Surgical     Other Topics Concern     Parent/Sibling W/ Cabg, Mi Or Angioplasty Before 65f 55m? No     Social History Narrative       Review of Systems:  Skin:  Negative       Eyes:  Positive for glasses    ENT:  Negative      Respiratory:  Positive for shortness of breath;dyspnea on exertion;dyspnea at rest    "  Cardiovascular:  Negative for;palpitations;chest pain edema;dizziness;Positive for;fatigue;exercise intolerance    Gastroenterology:        Genitourinary:  Negative      Musculoskeletal:  Negative      Neurologic:    numbness or tingling of feet neuropathy in feet   Psychiatric:  Negative      Heme/Lymph/Imm:  Positive for allergies    Endocrine:  Negative for diabetes      Physical Exam:  Vitals: /70 (BP Location: Left leg, Cuff Size: Adult Regular)  Pulse 110  Resp 12  Ht 1.626 m (5' 4\")  Wt 82.5 kg (181 lb 12.8 oz)  SpO2 98%  BMI 31.21 kg/m2    Constitutional:  cooperative        Skin:  warm and dry to the touch        Head:  normocephalic        Eyes:  no xanthalasma        ENT:  no pallor or cyanosis        Neck:  carotid pulses are full and equal bilaterally, JVP normal, no carotid bruit, no thyromegaly        Chest:  normal breath sounds, clear to auscultation, normal A-P diameter, normal symmetry, normal respiratory excursion, no use of accessory muscles          Cardiac: regular rhythm;normal S1 and S2;no S3 or S4;no murmurs, gallops or rubs detected tachycardic                Abdomen:  abdomen soft, non-tender, BS normoactive, no mass, no HSM, no bruits        Vascular: pulses full and equal                                        Extremities and Back:      trace;bilateral LE edema          Neurological:  affect appropriate, oriented to time, person and place          HISTORY OF PRESENT ILLNESS:  I saw Ms. Banks for evaluation of atrial flutter.  She is a 77-year-old white female with a history of coronary artery disease and previous CABG years ago.  She carried a diagnosis of atrial fibrillation remotely but I could not find EKG of atrial fibrillation through the record.  She has been on warfarin, but her INR has not been consistently therapeutic.      The patient was found to be in atrial flutter with rapid ventricular rate near the end of April.  She was prescribed amiodarone but she only " took it for a few weeks and has been off the medicine for the last few days because she has not filled the prescription.  Symptomatically, she feels dizziness with exertion, but she does not feel palpitations.  There is no chest pain at the present time.  She does have some shortness of breath and fatigue.      Her past medical history is remarkable for coronary artery disease with preserved ejection fraction.  The CABG was performed in 2006.  She also has obstructive sleep apnea on CPAP, type 2 diabetes, hypertension, restless legs syndrome and osteoarthritis.      The patient has been having some difficulty for her ambulation and she is using a walker at home.      PHYSICAL EXAMINATION:   VITAL SIGNS:  Blood pressure was 126/70, heart rate 110 beats per minute, body weight 181 pounds.   GENERAL:  She came to the clinic in a wheelchair.   HEENT:  Eyes and ENT were unremarkable.   LUNGS:  Clear.   CARDIAC:  Rhythm was the irregular and heart sounds were normal without murmur.   ABDOMEN:  Examination showed no hepatomegaly.   EXTREMITIES:  There was no pedal edema.      Recent EKGs from 04/28 showed atrial flutter with rapid ventricular rate and frequent PVCs.  The flutter morphology seemed to be typical.  Her echocardiography from February reported pulmonary hypertension that is considered to be severe.      ASSESSMENT AND RECOMMENDATIONS:  Ms. Banks is a 77-year-old white female with a history of coronary artery disease and preserved ejection fraction.  She has new onset atrial flutter with rapid ventricular rate.  The current heart rate is still fast.  I have added diltiazem 120 mg once a day for better rate control.  She will continue metoprolol 75 mg p.o. b.i.d.  Since she has been off amiodarone and her INR is subtherapeutic, I have asked her not to go back on amiodarone.  For management of atrial flutter, I have recommended catheter ablation.  Since the INR is not therapeutic, she would need a BRANDEN before  atrial flutter ablation.  The risks and benefits of BRANDEN and atrial flutter ablation were explained to the patient and her daughter today.  They expressed understanding and consented for the procedure.      She may stop diltiazem and reduce the dose of metoprolol to 50 mg p.o. b.i.d. after the atrial flutter ablation.     SUSAN GONZÁLES MD       CC:      Dheeraj Jj MD   [address] [address]      Rajinder Betancourt MD  [facility]  [address] [place] [id number]

## 2017-05-18 NOTE — TELEPHONE ENCOUNTER
isosorbide mononitrate (IMDUR) 30 MG 24 hr tablet  Last Written Prescription Date: 03/03/2017  Last Fill Quantity: 30,  # refills: 2   Last Office Visit with [facility], [facility] or [facility] prescribing provider: 03/31/2017                                         Next 5 appointments (look out 90 days)     May 24, 2017 11:00 AM CDT   Office Visit with Dheeraj Jj MD   [facility] ([facility])    [address] [place] [id number] [phone]

## 2017-05-18 NOTE — PROGRESS NOTES
HPI and Plan:   See dictation    Orders Placed This Encounter   Procedures     EP Ablation Procedures     Transesophageal Echocardiogram       Orders Placed This Encounter   Medications     diltiazem (CARDIZEM CD) 120 MG 24 hr capsule     Sig: Take 1 capsule (120 mg) by mouth daily     Dispense:  30 capsule     Refill:  3       Medications Discontinued During This Encounter   Medication Reason     amiodarone (PACERONE/CODARONE) 200 MG tablet          Encounter Diagnoses   Name Primary?     Atrial flutter, unspecified type (H)      Typical atrial flutter (H) Yes       CURRENT MEDICATIONS:  Current Outpatient Prescriptions   Medication Sig Dispense Refill     diltiazem (CARDIZEM CD) 120 MG 24 hr capsule Take 1 capsule (120 mg) by mouth daily 30 capsule 3     GLIPIZIDE XL PO Take 10 mg by mouth daily       PRAVASTATIN SODIUM PO Take 40 mg by mouth daily       GABAPENTIN PO Take 300 mg by mouth daily       WARFARIN SODIUM PO Give 1mg by mouth Monday and Tuesday. 2.5mg by mouth on Wednesday Recheck INR on Thursday 5/18       metoprolol (LOPRESSOR) 50 MG tablet Take 1.5 tablets (75 mg) by mouth 2 times daily 60 tablet 0     pramipexole (MIRAPEX) 1 MG tablet TAKE 3 TABLETS BY MOUTH EVERY EVENING 270 tablet 2     furosemide (LASIX) 40 MG tablet 40 mg a day 90 tablet 3     cyanocobalamin (VITAMIN B12) 1000 MCG/ML injection Inject 1 mL (1,000 mcg) into the muscle every 30 days 1 mL 11     isosorbide mononitrate (IMDUR) 30 MG 24 hr tablet Take 3 tablets (90 mg) by mouth daily 30 tablet 2     ferrous sulfate (IRON) 325 (65 FE) MG tablet Take 1 tablet (325 mg) by mouth daily (with breakfast) 100 tablet      calcium carbonate-vitamin D 500-400 MG-UNIT TABS tablt Take 1 tablet by mouth 3 times daily 180 tablet 3     ORDER FOR DME Equipment being ordered: cpap machine, mask, humidifier, tubing and filters 1 Device 0     nitroglycerin (NITROSTAT) 0.4 MG SL tablet Place 1 tablet under the tongue See Admin Instructions. for chest pain  25 tablet 0       ALLERGIES     Allergies   Allergen Reactions     Ciprofloxacin Nausea and Vomiting     Zofran did not help     Oxycodone Visual Disturbance     Delusions, blackouts      Lisinopril Cough     Penicillins Rash     Sulfa Drugs GI Disturbance     LOSS OF TASTE       PAST MEDICAL HISTORY:  Past Medical History:   Diagnosis Date     Carpal tunnel syndrome      Coronary atherosclerosis of native coronary artery 2006    5 vessel CABG     OBSTRUCTIVE SLEEP APNEA     using CPAP     Osteoarthrosis, unspecified whether generalized or localized, unspecified site     generalized arthritis, particularly in her hands and feet         Other and combined forms of senile cataract 2000    Bilateral     Other and unspecified hyperlipidemia      RESTLESS LEGS SYNDROME      TENOSYNOV HAND/WRIST NEC 6/30/2006     Type II or unspecified type diabetes mellitus without mention of complication, not stated as uncontrolled 2001    Diabetes mellitus/pt is diet controlled with weight loss     Typical atrial flutter (H) 01/17/2007     Unspecified essential hypertension        PAST SURGICAL HISTORY:  Past Surgical History:   Procedure Laterality Date     ANGIOPLASTY       C APPENDECTOMY       C CABG, VEIN, FIVE  12/06    SVG x 4 and LIMA to LAD     C SOTO W/O FACETEC FORAMOT/DSKC 1/2 VRT SEG, LUMBAR  1968     C REMV CATARACT INTRACAP,INSERT LENS      Bilateral     C TOTAL ABDOM HYSTERECTOMY  1995     - fibroids     C VAGOTOMY/PYLOROPLASTY,SELECT  1970     COLONOSCOPY  12/3/2007     COLONOSCOPY  1/17/2014    Procedure: COLONOSCOPY;  Colonoscopy;  Surgeon: Zack Stearns MD;  Location:  GI     CYSTOSCOPY  11/25/09    [facility]     ENDOSCOPY  03/21/2000    Upper GI     HC COLONOSCOPY THRU STOMA, DIAGNOSTIC  10/7/04    poor prep, repeat in 2-3 years     HC COLONOSCOPY W/WO BRUSH/WASH  10/31/07    Repeat-poor quality prep     HC REMOVAL OF OVARY/TUBE(S)      Salpingo-Oophorectomy, Unilateral     HC REVISE MEDIAN N/CARPAL TUNNEL SURG       Carpal tunnel release      [facility]I ENDOSCOPY DIAG W BIOPSY  10/31/07      UGI ENDOSCOPY, SIMPLE EXAM  12/3/2007     OPEN REDUCTION INTERNAL FIXATION HIP NAILING Left 7/3/2016    Procedure: OPEN REDUCTION INTERNAL FIXATION HIP NAILING;  Surgeon: Lake Schulz MD;  Location: [facility] OR       FAMILY HISTORY:  Family History   Problem Relation Age of Onset     DIABETES Father      AODM     Neurologic Disorder Father      Parkinson's     Blood Disease Father       from blood clot from leg to lung immediately after hip fracture     DIABETES Paternal Grandmother      adult onset     DIABETES Maternal Grandmother      adult onset     Hypertension No family hx of      CEREBROVASCULAR DISEASE No family hx of        SOCIAL HISTORY:  Social History     Social History     Marital status:      Spouse name: Urbano Banks     Number of children: 2     Years of education: 13     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 No     Social History Narrative       Review of Systems:  Skin:  Negative       Eyes:  Positive for glasses    ENT:  Negative      Respiratory:  Positive for shortness of breath;dyspnea on exertion;dyspnea at rest     Cardiovascular:  Negative for;palpitations;chest pain edema;dizziness;Positive for;fatigue;exercise intolerance    Gastroenterology:        Genitourinary:  Negative      Musculoskeletal:  Negative      Neurologic:    numbness or tingling of feet neuropathy in feet   Psychiatric:  Negative      Heme/Lymph/Imm:  Positive  "for allergies    Endocrine:  Negative for diabetes      Physical Exam:  Vitals: /70 (BP Location: Left leg, Cuff Size: Adult Regular)  Pulse 110  Resp 12  Ht 1.626 m (5' 4\")  Wt 82.5 kg (181 lb 12.8 oz)  SpO2 98%  BMI 31.21 kg/m2    Constitutional:  cooperative        Skin:  warm and dry to the touch        Head:  normocephalic        Eyes:  no xanthalasma        ENT:  no pallor or cyanosis        Neck:  carotid pulses are full and equal bilaterally, JVP normal, no carotid bruit, no thyromegaly        Chest:  normal breath sounds, clear to auscultation, normal A-P diameter, normal symmetry, normal respiratory excursion, no use of accessory muscles          Cardiac: regular rhythm;normal S1 and S2;no S3 or S4;no murmurs, gallops or rubs detected tachycardic                Abdomen:  abdomen soft, non-tender, BS normoactive, no mass, no HSM, no bruits        Vascular: pulses full and equal                                        Extremities and Back:      trace;bilateral LE edema          Neurological:  affect appropriate, oriented to time, person and place              CC  Rajinder Betancourt MD  [facility]  [address] [place] [id number]              "

## 2017-05-18 NOTE — PROGRESS NOTES
ANTICOAGULATION FOLLOW-UP CLINIC VISIT    Patient Name:  Kathryn Banks  Date:  5/18/2017  Contact Type:  Telephone/ Janine - homecare    SUBJECTIVE:     Patient Findings     Positives Missed doses    Comments Pt was discharged from the nursing home earlier this week and HC only can see her dosing from Monday. Her dosing seems to be very low compared to what we had her on in the past and she also missed Tuesday's. I have advised Janine to have her take 5 mg daily and we will recheck her INR on Tuesday to get a better idea of her dosing. Gianni Palumbo, RN, BSN      Reason for Call: INR     Who is calling? Home Care: [facility]  Phone number: [phone]     Fax number:      Name of caller: Janine     INR Value: 1.5     Are there any other concerns: No     Can we leave a detailed message on this number? YES     Phone number patient can be reached at: Other phone number: [phone]        Call taken on 5/18/2017 at 12:01 PM by Marli Belle           OBJECTIVE    INR   Date Value Ref Range Status   05/18/2017 1.5  Final       ASSESSMENT / PLAN  INR assessment SUB    Recheck INR In: 5 DAYS    INR Location Homecare INR      Anticoagulation Summary as of 5/18/2017     INR goal 2.0-3.0   Today's INR 1.5!   Maintenance plan 7.5 mg (2.5 mg x 3) on Fri; 5 mg (2.5 mg x 2) all other days   Full instructions 5/19: 5 mg; Otherwise 7.5 mg on Fri; 5 mg all other days   Weekly total 37.5 mg   Plan last modified Nicki Calvillo, RN (4/11/2017)   Next INR check 5/23/2017   Target end date     Indications   Long-term (current) use of anticoagulants [Z79.01] [Z79.01]  Atrial fibrillation (H) (Resolved) [I48.91]  Paroxysmal atrial fibrillation (H) (Resolved) [I48.0]         Anticoagulation Episode Summary     INR check location     Preferred lab     Send INR reminders to [facility] RANDI    Comments 2.5 mg tablets, pm dose. Alere home monitor      Anticoagulation Care Providers     Provider Role Specialty Phone number    Dheeraj Jj MD  [facility] Internal Medicine [phone]            See the Encounter Report to view Anticoagulation Flowsheet and Dosing Calendar (Go to Encounters tab in chart review, and find the Anticoagulation Therapy Visit)    Dosage adjustment made based on physician directed care plan.    Gianni Palumbo RN

## 2017-05-19 NOTE — PROGRESS NOTES
Clinic Care Coordination Contact  OUTREACH    Referral Information:  Referral Source: IP/TCU Report  Reason for Contact: TCU follow up     Clinical Concerns:  Current Medical Concerns: Called and spoke with pt, states she is doing well, she is scheduled to have an ablation June 7th.  She does have some questions about if she needs a preop and if she needs to stop her coumadin.   Pt states home care has already been out to see her.  Pt does have an appt on 5/24 and 6/7 with cardiology.    Current Behavioral Concerns: none      Medication Management:  RN set up with pt self administration.      Resources and Interventions:  Current Resources:   Home Care     Plan:   Pt will call cardiology with questions.  Pt will go to appts as scheduled.   [facility] will be pt's primary   RN will outreach to [facility] to have them notify CC when pt is discharged from services.     Meenakshi Larsen RN,BSN  Clinical Care Coordinator    [facility] [phone]  [facility] [phone]

## 2017-05-19 NOTE — PROGRESS NOTES
HISTORY OF PRESENT ILLNESS:  I saw Ms. Banks for evaluation of atrial flutter.  She is a 77-year-old white female with a history of coronary artery disease and previous CABG years ago.  She carried a diagnosis of atrial fibrillation remotely but I could not find EKG of atrial fibrillation through the record.  She has been on warfarin, but her INR has not been consistently therapeutic.      The patient was found to be in atrial flutter with rapid ventricular rate near the end of April.  She was prescribed amiodarone but she only took it for a few weeks and has been off the medicine for the last few days because she has not filled the prescription.  Symptomatically, she feels dizziness with exertion, but she does not feel palpitations.  There is no chest pain at the present time.  She does have some shortness of breath and fatigue.      Her past medical history is remarkable for coronary artery disease with preserved ejection fraction.  The CABG was performed in 2006.  She also has obstructive sleep apnea on CPAP, type 2 diabetes, hypertension, restless legs syndrome and osteoarthritis.      The patient has been having some difficulty for her ambulation and she is using a walker at home.      PHYSICAL EXAMINATION:   VITAL SIGNS:  Blood pressure was 126/70, heart rate 110 beats per minute, body weight 181 pounds.   GENERAL:  She came to the clinic in a wheelchair.   HEENT:  Eyes and ENT were unremarkable.   LUNGS:  Clear.   CARDIAC:  Rhythm was the irregular and heart sounds were normal without murmur.   ABDOMEN:  Examination showed no hepatomegaly.   EXTREMITIES:  There was no pedal edema.      Recent EKGs from 04/28 showed atrial flutter with rapid ventricular rate and frequent PVCs.  The flutter morphology seemed to be typical.  Her echocardiography from February reported pulmonary hypertension that is considered to be severe.      ASSESSMENT AND RECOMMENDATIONS:  Ms. Banks is a 77-year-old white female with a  history of coronary artery disease and preserved ejection fraction.  She has new onset atrial flutter with rapid ventricular rate.  The current heart rate is still fast.  I have added diltiazem 120 mg once a day for better rate control.  She will continue metoprolol 75 mg p.o. b.i.d.  Since she has been off amiodarone and her INR is subtherapeutic, I have asked her not to go back on amiodarone.  For management of atrial flutter, I have recommended catheter ablation.  Since the INR is not therapeutic, she would need a BRANDEN before atrial flutter ablation.  The risks and benefits of BRANDEN and atrial flutter ablation were explained to the patient and her daughter today.  They expressed understanding and consented for the procedure.      She may stop diltiazem and reduce the dose of metoprolol to 50 mg p.o. b.i.d. after the atrial flutter ablation.      cc:      [place] [address]         SUSAN GONZÁLES MD             D: 2017 15:32   T: 2017 12:14   MT: DANIELLE      Name:     MARI HERNANDEZ   MRN:      [id number]        Account:      [id number]   :      1940           Service Date: 2017      Document: [id number]

## 2017-05-22 ENCOUNTER — ANTICOAGULATION THERAPY VISIT (OUTPATIENT)
Dept: ANTICOAGULATION | Facility: CLINIC | Age: 77
End: 2017-05-22

## 2017-05-22 ENCOUNTER — TELEPHONE (OUTPATIENT)
Dept: INTERNAL MEDICINE | Facility: CLINIC | Age: 77
End: 2017-05-22

## 2017-05-22 ENCOUNTER — DOCUMENTATION ONLY (OUTPATIENT)
Dept: CARE COORDINATION | Facility: CLINIC | Age: 77
End: 2017-05-22

## 2017-05-22 DIAGNOSIS — Z79.01 LONG-TERM (CURRENT) USE OF ANTICOAGULANTS: ICD-10-CM

## 2017-05-22 LAB — INR PPP: 3.5

## 2017-05-22 RX ORDER — ISOSORBIDE MONONITRATE 30 MG/1
90 TABLET, EXTENDED RELEASE ORAL DAILY
Qty: 270 TABLET | Refills: 3 | Status: ON HOLD | OUTPATIENT
Start: 2017-05-22 | End: 2017-06-15

## 2017-05-22 NOTE — TELEPHONE ENCOUNTER
Reason for Call:  INR    Who is calling?  Home Care: [facility]    Phone number:  [phone]    Fax number:      Name of caller: Grant ESCOBAR    INR Value:  3.5    Are there any other concerns:  Yes: patient has a broken blood vessel in left eye    Can we leave a detailed message on this number? YES    Phone number patient can be reached at: Other phone number:   #[phone]       Call taken on 5/22/2017 at 9:47 AM by Marli Belle

## 2017-05-22 NOTE — PROGRESS NOTES
Saw cardiology on the 18th and their note discussed stopping metoprolol and diltiazem, did she do that yet?  That would cause increased pulse.  I would have her contact cardiology as they have been managing her rhythms.

## 2017-05-22 NOTE — MR AVS SNAPSHOT
Kathryn MICKY Banks   5/22/2017   Anticoagulation Therapy Visit    Description:  77 year old female   Provider:  Dheeraj Jj MD   Department:   Anticoag           INR as of 5/22/2017     Today's INR 3.5!      Anticoagulation Summary as of 5/22/2017     INR goal 2.0-3.0   Today's INR 3.5!   Full instructions 5/22: 1.25 mg; 5/23: 2.5 mg; 5/24: 1.25 mg; 5/25: 2.5 mg; Otherwise 7.5 mg on Fri; 5 mg all other days   Next INR check 5/26/2017    Indications   Long-term (current) use of anticoagulants [Z79.01] [Z79.01]  Atrial fibrillation (H) (Resolved) [I48.91]  Paroxysmal atrial fibrillation (H) (Resolved) [I48.0]         Contact Numbers     Clinic Number:         May 2017 Details    Sun Mon Tue Wed Thu Fri Sat      1               2               3               4               5               6                 7               8               9               10               11               12               13                 14               15               16               17               18               19               20                 21               22      1.25 mg   See details      23      2.5 mg         24      1.25 mg         25      2.5 mg         26            27                 28               29               30               31                   Date Details   05/22 This INR check       Date of next INR:  5/26/2017         How to take your warfarin dose     To take:  1.25 mg Take 0.5 of a 2.5 mg tablet.    To take:  2.5 mg Take 1 of the 2.5 mg tablets.    To take:  7.5 mg Take 3 of the 2.5 mg tablets.

## 2017-05-22 NOTE — PROGRESS NOTES
ANTICOAGULATION FOLLOW-UP CLINIC VISIT    Patient Name:  Kathryn Banks  Date:  5/22/2017  Contact Type:  Telephone/ VELASQUE[facility] nurse    SUBJECTIVE:     Patient Findings     Positives Other complaints (there is a busted blood vessel in eye- denies any vision disturbanses, pain, etc. ), Unexplained INR or factor level change    Comments Reason for Call: INR     Who is calling? Home Care: [facility]     Phone number: [phone]     Fax number:      Name of caller: Grant ESCOBAR     INR Value: 3.5     Are there any other concerns: Yes: patient has a broken blood vessel in left eye     Can we leave a detailed message on this number? YES     Phone number patient can be reached at: Other phone number:  #[phone]         Call taken on 5/22/2017 at 9:47 AM by Marli Belle              OBJECTIVE    INR   Date Value Ref Range Status   05/22/2017 3.5  Final       ASSESSMENT / PLAN  INR assessment SUB    Recheck INR In: 4 DAYS    INR Location Homecare INR      Anticoagulation Summary as of 5/22/2017     INR goal 2.0-3.0   Today's INR 3.5!   Maintenance plan 7.5 mg (2.5 mg x 3) on Fri; 5 mg (2.5 mg x 2) all other days   Full instructions 5/22: 1.25 mg; 5/23: 2.5 mg; 5/24: 1.25 mg; 5/25: 2.5 mg; Otherwise 7.5 mg on Fri; 5 mg all other days   Weekly total 37.5 mg   Plan last modified Nicki Calvillo, RN (4/11/2017)   Next INR check 5/26/2017   Target end date     Indications   Long-term (current) use of anticoagulants [Z79.01] [Z79.01]  Atrial fibrillation (H) (Resolved) [I48.91]  Paroxysmal atrial fibrillation (H) (Resolved) [I48.0]         Anticoagulation Episode Summary     INR check location     Preferred lab     Send INR reminders to [place] RANDI    Comments 2.5 mg tablets, pm dose. Dahlia home monitor      Anticoagulation Care Providers     Provider Role Specialty Phone number    Dheeraj Jj MD [facility] Internal Medicine [phone]            See the Encounter Report to view Anticoagulation Flowsheet and Dosing  Calendar (Go to Encounters tab in chart review, and find the Anticoagulation Therapy Visit)    Dosage adjustment made based on physician directed care plan.    Last week = 23.5 mg  Last week = 22.5 (1.25 mg Mon, 2.5 mg Tues, 1.25 mg Wed, 2.5 mg Thursday)    Zo Norwood RN

## 2017-05-22 NOTE — TELEPHONE ENCOUNTER
Prescription approved per [facility] Refill Protocol.  Christina Hernandez RN

## 2017-05-22 NOTE — PROGRESS NOTES
[facility] now requests orders and shares plan of care/discharge summaries for some patients through [facility].  Please REPLY TO THIS MESSAGE in order to give authorization for orders when needed.  This is considered a verbal order, you will still receive a faxed copy of orders for signature.  Thank you for your assistance in improving collaboration for our patients    UPDATE.  Hi Dr Jj, I am with this patient for home care admission and wanted to update you on her HR. Today is 131 bpm. /98. Pt denies any palpitations, pain or discomfort.   She also has weeping BLE with 3 small 0.5 cm open areas from edema.  Will start wrapping her legs.

## 2017-05-22 NOTE — PROGRESS NOTES
In response to last note, Pt has been getting Metoprolol 50 mg BID. Will change it on our med list per Cardiology orders to 75 mg BID and set up in med box. She reports starting Diltiazem when it was prescribed on the 18th.   Thank you,  Dagmar Pérez RN   [phone]

## 2017-05-23 ENCOUNTER — DOCUMENTATION ONLY (OUTPATIENT)
Dept: CARE COORDINATION | Facility: CLINIC | Age: 77
End: 2017-05-23

## 2017-05-23 ENCOUNTER — TELEPHONE (OUTPATIENT)
Dept: FAMILY MEDICINE | Facility: OTHER | Age: 77
End: 2017-05-23

## 2017-05-23 NOTE — PROGRESS NOTES
Dear [facility] process is that all ordered disciplines will be involved in the development of the plan of care.  The following disciplines were unable to see Kathryn Banks; MRN [id number] within the 5 day evaluation window.  There will be a delay in the evalation visit by PT and OT.  We will notify you when the evaluation is completed.  The patient has been notified.      Sincerely [facility]  Alesia Cain  [phone]

## 2017-05-23 NOTE — TELEPHONE ENCOUNTER
Reason for call:  Form  Reason for Call:  Form, our goal is to have forms completed with 72 hours, however, some forms may require a visit or additional information.    Type of letter, form or note:  medical    Who is the form from?: Home care    Where did the form come from: form was faxed in    What clinic location was the form placed at?: [facility] - [phone]    Where the form was placed: [facility]    What number is listed as a contact on the form?: [phone]       Additional comments: discharge summary, needs jeromy collins signature    Call taken on 5/23/2017 at 2:58 PM by Sindy Weems

## 2017-05-24 ENCOUNTER — TELEPHONE (OUTPATIENT)
Dept: INTERNAL MEDICINE | Facility: CLINIC | Age: 77
End: 2017-05-24

## 2017-05-24 ENCOUNTER — OFFICE VISIT (OUTPATIENT)
Dept: INTERNAL MEDICINE | Facility: CLINIC | Age: 77
End: 2017-05-24
Payer: COMMERCIAL

## 2017-05-24 VITALS
SYSTOLIC BLOOD PRESSURE: 118 MMHG | DIASTOLIC BLOOD PRESSURE: 78 MMHG | OXYGEN SATURATION: 97 % | BODY MASS INDEX: 34.67 KG/M2 | WEIGHT: 202 LBS | TEMPERATURE: 97.4 F | HEART RATE: 110 BPM | RESPIRATION RATE: 16 BRPM

## 2017-05-24 DIAGNOSIS — E11.59 TYPE 2 DIABETES MELLITUS WITH OTHER CIRCULATORY COMPLICATIONS (H): ICD-10-CM

## 2017-05-24 DIAGNOSIS — Z79.01 LONG-TERM (CURRENT) USE OF ANTICOAGULANTS: ICD-10-CM

## 2017-05-24 DIAGNOSIS — N18.30 CHRONIC KIDNEY DISEASE, STAGE III (MODERATE) (H): ICD-10-CM

## 2017-05-24 DIAGNOSIS — I48.3 TYPICAL ATRIAL FLUTTER (H): Primary | ICD-10-CM

## 2017-05-24 DIAGNOSIS — I89.0 LYMPHEDEMA: ICD-10-CM

## 2017-05-24 DIAGNOSIS — I27.20 PULMONARY HYPERTENSION (H): ICD-10-CM

## 2017-05-24 LAB
ALBUMIN SERPL-MCNC: 2.6 G/DL (ref 3.4–5)
ALP SERPL-CCNC: 250 U/L (ref 40–150)
ALT SERPL W P-5'-P-CCNC: 43 U/L (ref 0–50)
ANION GAP SERPL CALCULATED.3IONS-SCNC: 7 MMOL/L (ref 3–14)
AST SERPL W P-5'-P-CCNC: 20 U/L (ref 0–45)
BILIRUB SERPL-MCNC: 0.3 MG/DL (ref 0.2–1.3)
BUN SERPL-MCNC: 35 MG/DL (ref 7–30)
CALCIUM SERPL-MCNC: 7.3 MG/DL (ref 8.5–10.1)
CHLORIDE SERPL-SCNC: 118 MMOL/L (ref 94–109)
CO2 SERPL-SCNC: 22 MMOL/L (ref 20–32)
CREAT SERPL-MCNC: 1.58 MG/DL (ref 0.52–1.04)
ERYTHROCYTE [DISTWIDTH] IN BLOOD BY AUTOMATED COUNT: 19 % (ref 10–15)
GFR SERPL CREATININE-BSD FRML MDRD: 32 ML/MIN/1.7M2
GLUCOSE SERPL-MCNC: 185 MG/DL (ref 70–99)
HCT VFR BLD AUTO: 35.7 % (ref 35–47)
HGB BLD-MCNC: 10.1 G/DL (ref 11.7–15.7)
MCH RBC QN AUTO: 23.3 PG (ref 26.5–33)
MCHC RBC AUTO-ENTMCNC: 28.3 G/DL (ref 31.5–36.5)
MCV RBC AUTO: 82 FL (ref 78–100)
PLATELET # BLD AUTO: 282 10E9/L (ref 150–450)
POTASSIUM SERPL-SCNC: 3.7 MMOL/L (ref 3.4–5.3)
PROT SERPL-MCNC: 5.8 G/DL (ref 6.8–8.8)
RBC # BLD AUTO: 4.34 10E12/L (ref 3.8–5.2)
SODIUM SERPL-SCNC: 147 MMOL/L (ref 133–144)
WBC # BLD AUTO: 6.9 10E9/L (ref 4–11)

## 2017-05-24 PROCEDURE — 99214 OFFICE O/P EST MOD 30 MIN: CPT | Performed by: INTERNAL MEDICINE

## 2017-05-24 PROCEDURE — 36415 COLL VENOUS BLD VENIPUNCTURE: CPT | Performed by: INTERNAL MEDICINE

## 2017-05-24 PROCEDURE — 85027 COMPLETE CBC AUTOMATED: CPT | Performed by: INTERNAL MEDICINE

## 2017-05-24 PROCEDURE — 80053 COMPREHEN METABOLIC PANEL: CPT | Performed by: INTERNAL MEDICINE

## 2017-05-24 RX ORDER — DILTIAZEM HYDROCHLORIDE 120 MG/1
240 CAPSULE, COATED, EXTENDED RELEASE ORAL DAILY
Qty: 60 CAPSULE | Refills: 3 | Status: ON HOLD | OUTPATIENT
Start: 2017-05-24 | End: 2017-06-07

## 2017-05-24 ASSESSMENT — PAIN SCALES - GENERAL: PAINLEVEL: NO PAIN (0)

## 2017-05-24 NOTE — TELEPHONE ENCOUNTER
----- Message from Dheeraj Jj MD sent at 5/24/2017 12:33 PM CDT -----  Her labs are ok, kidneys and creatinine are down from 3 weeks ago, do the increase in the diuretic from 40mg to 80 mg of lasix,

## 2017-05-24 NOTE — MR AVS SNAPSHOT
After Visit Summary   5/24/2017    Kathryn Banks    MRN: [id number]           Patient Information     Date Of Birth          1940        Visit Information        Provider Department      5/24/2017 11:00 AM Dheeraj Jj MD [employer]         Follow-ups after your visit        Your next 10 appointments already scheduled     Jun 07, 2017  8:30 AM CDT   Ech Hardik with SHECHR2   [facility]iology ([facility])    [address]  [place] [address] [phone]           1.  Please bring or wear a comfortable two-piece outfit. 2.  Arrival time: -   [facility]:  arrive 75 minutes prior to examination time. -   [facility]:  arrive 90 minutes prior to examination time. -   [facility]:   arrive 15 minutes prior to examination time. 3.  Plan to have someone here to drive you home after the test. -   Someone should stay with you for 6 hours after your test. 4.  No food or drink: -   6 hours before the test 5.  If you take antacids or water pills (diuretics): Do not take them until after your test. You may take blood pressure medicine with a few sips of water. 6.  If you have diabetes: -   Morning slots preferred -   If you take insulin, call your diabetes care team. Ask if you should take a   dose the morning of your test. -   If you take diabetes medicine by mouth, don't take it on the morning of your test. Bring it with you to take after the test. (If you have questions, call your diabetes care team.) 7.  Bring a list of any medicines you are taking. 8.  Do not drive for 24 hours after the test. 9.   A responsible adult must stay with you for 24 hours after the test.  10.  For any questions that cannot be answered, please contact the ordering physician            Jun 07, 2017  9:30 AM CDT   Ep 90 Minute with SHCVR4   [facility] Cardiac Catheterization Lab ([facility])    [address]  [place] [address] [phone]  "             Who to contact     If you have questions or need follow up information about today's clinic visit or your schedule please contact [facility] directly at [phone].  Normal or non-critical lab and imaging results will be communicated to you by MyChart, letter or phone within 4 business days after the clinic has received the results. If you do not hear from us within 7 days, please contact the clinic through [facility] or phone. If you have a critical or abnormal lab result, we will notify you by phone as soon as possible.  Submit refill requests through [facility] or call your pharmacy and they will forward the refill request to us. Please allow 3 business days for your refill to be completed.          Additional Information About Your Visit        [facility] Information     [facility] lets you send messages to your doctor, view your test results, renew your prescriptions, schedule appointments and more. To sign up, go to [email or web address] . Click on \"Log in\" on the left side of the screen, which will take you to the Welcome page. Then click on \"Sign up Now\" on the right side of the page.     You will be asked to enter the access code listed below, as well as some personal information. Please follow the directions to create your username and password.     Your access code is: [email or web address]  Expires: 2017 11:18 AM     Your access code will  in 90 days. If you need help or a new code, please call your [place] clinic or [phone].        Care EveryWhere ID     This is your Care EveryWhere ID. This could be used by other organizations to access your [place] medical records  [id number]        Your Vitals Were     Pulse Temperature Respirations Pulse Oximetry BMI (Body Mass Index)       130 97.4  F (36.3  C) (Temporal) 16 97% 35.19 kg/m2        Blood Pressure from Last 3 Encounters:   17 118/78   17 126/70   05/15/17 132/80    Weight from Last 3 Encounters:   17 " 205 lb (93 kg)   05/18/17 181 lb 12.8 oz (82.5 kg)   05/15/17 185 lb (83.9 kg)              Today, you had the following     No orders found for display       Primary Care Provider Office Phone # Fax #    Dheeraj Jj [phone]       [facility] [address] [place] [address]        Thank you!     Thank you for choosing [facility]  for your care. Our goal is always to provide you with excellent care. Hearing back from our patients is one way we can continue to improve our services. Please take a few minutes to complete the written survey that you may receive in the mail after your visit with us. Thank you!             Your Updated Medication List - Protect others around you: Learn how to safely use, store and throw away your medicines at www.disposemymeds.org.          This list is accurate as of: 5/24/17 11:18 AM.  Always use your most recent med list.                   Brand Name Dispense Instructions for use    calcium carbonate-vitamin D 500-400 MG-UNIT Tabs per tablet     180 tablet    Take 1 tablet by mouth 3 times daily       cyanocobalamin 1000 MCG/ML injection    VITAMIN B12    1 mL    Inject 1 mL (1,000 mcg) into the muscle every 30 days       diltiazem 120 MG 24 hr capsule    CARDIZEM CD    30 capsule    Take 1 capsule (120 mg) by mouth daily       ferrous sulfate 325 (65 FE) MG tablet    IRON    100 tablet    Take 1 tablet (325 mg) by mouth daily (with breakfast)       furosemide 40 MG tablet    LASIX    90 tablet    40 mg a day       GABAPENTIN PO      Take 300 mg by mouth daily       GLIPIZIDE XL PO      Take 10 mg by mouth daily       isosorbide mononitrate 30 MG 24 hr tablet    IMDUR    270 tablet    Take 3 tablets (90 mg) by mouth daily       metoprolol 50 MG tablet    LOPRESSOR    60 tablet    Take 1.5 tablets (75 mg) by mouth 2 times daily       nitroglycerin 0.4 MG sublingual tablet    NITROSTAT    25 tablet    Place 1 tablet under the  tongue See Admin Instructions. for chest pain       order for DME     1 Device    Equipment being ordered: cpap machine, mask, humidifier, tubing and filters       pramipexole 1 MG tablet    MIRAPEX    270 tablet    TAKE 3 TABLETS BY MOUTH EVERY EVENING       PRAVASTATIN SODIUM PO      Take 40 mg by mouth daily       WARFARIN SODIUM PO      Give 1mg by mouth Monday and Tuesday. 2.5mg by mouth on Wednesday Recheck INR on Thursday 5/18

## 2017-05-24 NOTE — PROGRESS NOTES
SUBJECTIVE:                                                    Kathryn Banks is a 77 year old female who presents to clinic today for the following health issues:    Hospital Follow-up Visit:    Hospital/Nursing Home/ Rehab Facility: [facility]   Date of Admission: 5/2/17  Date of Discharge: 5/16/17  Reason(s) for Admission: atrial fib and physical deconditioning            Problems taking medications regularly:  None       Medication changes since discharge: None       Problems adhering to non-medication therapy:  None    Summary of hospitalization:  [facility] discharge summary reviewed  Diagnostic Tests/Treatments reviewed.  Follow up needed: none  Other Healthcare Providers Involved in Patient s Care:         Homecare  Update since discharge: stable.     Post Discharge Medication Reconciliation: discharge medications reconciled and changed, per note/orders (see AVS).  Plan of care communicated with patient        Left subconjunctival hemorrhage been there a few days.     Legs are still weeping and swelling.      Plan to an ablation for the atrial flutter on June 7th, will BRANDEN first..  Off amiodarone and on metoprolol and diltiazem. She says heart rate is still up there at 130 range    Weight is up 20#, lasix is 40mg a day.  Lasix was at 120 mg a day but with renal failure went to 40mg a day, now will try 80mg.     Blood sugars are 150 range, back on glipizide at 10mg a day.  No insulin and doing ok.      Past Medical History:   Diagnosis Date     Carpal tunnel syndrome      Coronary atherosclerosis of native coronary artery 2006    5 vessel CABG     OBSTRUCTIVE SLEEP APNEA     using CPAP     Osteoarthrosis, unspecified whether generalized or localized, unspecified site     generalized arthritis, particularly in her hands and feet         Other and combined forms of senile cataract 2000    Bilateral     Other and unspecified hyperlipidemia      RESTLESS LEGS SYNDROME      TENOSYNOV  HAND/WRIST NEC 6/30/2006     Type II or unspecified type diabetes mellitus without mention of complication, not stated as uncontrolled 2001    Diabetes mellitus/pt is diet controlled with weight loss     Typical atrial flutter (H) 01/17/2007     Unspecified essential hypertension      Current Outpatient Prescriptions   Medication     diltiazem (CARDIZEM CD) 120 MG 24 hr capsule     isosorbide mononitrate (IMDUR) 30 MG 24 hr tablet     GLIPIZIDE XL PO     PRAVASTATIN SODIUM PO     GABAPENTIN PO     WARFARIN SODIUM PO     metoprolol (LOPRESSOR) 50 MG tablet     pramipexole (MIRAPEX) 1 MG tablet     furosemide (LASIX) 40 MG tablet     cyanocobalamin (VITAMIN B12) 1000 MCG/ML injection     ferrous sulfate (IRON) 325 (65 FE) MG tablet     calcium carbonate-vitamin D 500-400 MG-UNIT TABS tablt     [DISCONTINUED] diltiazem (CARDIZEM CD) 120 MG 24 hr capsule     ORDER FOR DME     nitroglycerin (NITROSTAT) 0.4 MG SL tablet     No current facility-administered medications for this visit.      Social History   Substance Use Topics     Smoking status: Former Smoker     Years: 10.00     Quit date: 1/1/1969     Smokeless tobacco: Never Used     Alcohol use 0.0 oz/week     0 Standard drinks or equivalent per week      Comment: occasional- 2 drinks per month     Review of Systems  Constitutional-Weight gain.  ENT-No earpain, sore throat, voice changes or rhinitis.  Cardiac-Exertional SOB.  Respiratory-SOB.  GI-No nausea, vomitting, diarrhea, constipation, or blood in the stool.    Physical Exam  /78  Pulse 110  Temp 97.4  F (36.3  C) (Temporal)  Resp 16  Wt 205 lb (93 kg)  SpO2 97%  BMI 35.19 kg/m2  General Appearance-healthy, alert, no distress  Cardiac-sinus tachycardia  Lungs-clear to auscultation  Extremities-1+ hard pitting edema over her knees bilaterally with weeping on the left leg  Skin-no sign of cellulitis, weeping wounds on the left leg which were wrapped in a clinic.    ASSESSMENT:  Patient is here after  hospitalization and nursing home stay. She is doing okay at home. She does have atrial flutter with a rapid rate. Her rate is in the 120 range today. The plan is to get an ablation on June 7 after a BRANDEN. We will increase her diltiazem from 120-40 try to get better heart rate control until the ablation. Anticoagulation-the patient will continue on Coumadin.    Diabetes-the patient is on glipizide 10 mg a day this was held in the hospital due to acute renal failure but this has resolved and she is back on it, controlling her sugars.     Unfortunately the patient is having a significant weight gain of greater than 20 pounds, mild shortness of breath, weeping of her legs and peripheral edema. I believe this is from her heart disease. We know she has severe pulmonary hypertension. Her diuretics were decreased due to her acute renal failure in the hospital. We will increase his back from 40-80 mg of Lasix. She may have to go back to the 120 she was previously treated with    Electronically signed by Dheeraj Jj MD

## 2017-05-24 NOTE — NURSING NOTE
"Chief Complaint   Patient presents with     Hospital F/U     follow up from being discharged from [facility]       Initial /78  Pulse 130  Temp 97.4  F (36.3  C) (Temporal)  Resp 16  Wt 205 lb (93 kg)  SpO2 97%  BMI 35.19 kg/m2 Estimated body mass index is 35.19 kg/(m^2) as calculated from the following:    Height as of 5/18/17: 5' 4\" (1.626 m).    Weight as of this encounter: 205 lb (93 kg).  Medication Reconciliation: complete    "

## 2017-05-25 ENCOUNTER — TELEPHONE (OUTPATIENT)
Dept: INTERNAL MEDICINE | Facility: CLINIC | Age: 77
End: 2017-05-25

## 2017-05-25 ENCOUNTER — ANTICOAGULATION THERAPY VISIT (OUTPATIENT)
Dept: ANTICOAGULATION | Facility: CLINIC | Age: 77
End: 2017-05-25

## 2017-05-25 DIAGNOSIS — Z79.01 LONG-TERM (CURRENT) USE OF ANTICOAGULANTS: ICD-10-CM

## 2017-05-25 LAB — INR PPP: 2.2

## 2017-05-25 NOTE — PROGRESS NOTES
ANTICOAGULATION FOLLOW-UP CLINIC VISIT    Patient Name:  Kathryn Banks  Date:  5/25/2017  Contact Type:  Telephone/ Janine - homecare    SUBJECTIVE:     Patient Findings     Positives Missed doses (Missed the last 2 days), No Problem Findings    Comments Reason for Call:  INR     Who is calling?  Home Care: [facility]     Phone number:  [phone]     Fax number:       Name of caller: Janine     INR Value:  2.2     Are there any other concerns:  Yes: patient has not taken her Coumadin or Lasix in 2 days      Can we leave a detailed message on this number? YES     Phone number patient can be reached at: Home number on file [phone] (home)        Call taken on 5/25/2017 at 12:29 PM by Jo-Ann Bartlett              OBJECTIVE    INR   Date Value Ref Range Status   05/25/2017 2.2  Final       ASSESSMENT / PLAN  INR assessment THER    Recheck INR In: 5 DAYS    INR Location Homecare INR      Anticoagulation Summary as of 5/25/2017     INR goal 2.0-3.0   Today's INR 2.2   Maintenance plan 7.5 mg (2.5 mg x 3) on Fri; 5 mg (2.5 mg x 2) all other days   Full instructions 5/25: 2.5 mg; 5/26: 5 mg; 5/27: 2.5 mg; Otherwise 7.5 mg on Fri; 5 mg all other days   Weekly total 37.5 mg   Plan last modified Nicki Calvillo, RN (4/11/2017)   Next INR check 5/30/2017   Target end date     Indications   Long-term (current) use of anticoagulants [Z79.01] [Z79.01]  Atrial fibrillation (H) (Resolved) [I48.91]  Paroxysmal atrial fibrillation (H) (Resolved) [I48.0]         Anticoagulation Episode Summary     INR check location     Preferred lab     Send INR reminders to [facility] RANDI    Comments 2.5 mg tablets, pm dose. Alere home monitor      Anticoagulation Care Providers     Provider Role Specialty Phone number    Dheeraj Jj MD [facility] Internal Medicine [phone]            See the Encounter Report to view Anticoagulation Flowsheet and Dosing Calendar (Go to Encounters tab in chart review, and find the Anticoagulation Therapy  Visit)    Dosage adjustment made based on physician directed care plan.    Gianni Palumbo RN

## 2017-05-25 NOTE — MR AVS SNAPSHOT
Kathryn MICKY Banks   5/25/2017   Anticoagulation Therapy Visit    Description:  77 year old female   Provider:  Dheeraj Jj MD   Department:   Anticoag           INR as of 5/25/2017     Today's INR 2.2      Anticoagulation Summary as of 5/25/2017     INR goal 2.0-3.0   Today's INR 2.2   Full instructions 5/25: 2.5 mg; 5/26: 5 mg; 5/27: 2.5 mg; Otherwise 7.5 mg on Fri; 5 mg all other days   Next INR check 5/30/2017    Indications   Long-term (current) use of anticoagulants [Z79.01] [Z79.01]  Atrial fibrillation (H) (Resolved) [I48.91]  Paroxysmal atrial fibrillation (H) (Resolved) [I48.0]         Contact Numbers     Clinic Number:         May 2017 Details    Sun Mon Tue Wed Thu Fri Sat      1               2               3               4               5               6                 7               8               9               10               11               12               13                 14               15               16               17               18               19               20                 21               22               23               24               25      2.5 mg   See details      26      5 mg         27      2.5 mg           28      5 mg         29      5 mg         30            31                   Date Details   05/25 This INR check       Date of next INR:  5/30/2017         How to take your warfarin dose     To take:  2.5 mg Take 1 of the 2.5 mg tablets.    To take:  5 mg Take 2 of the 2.5 mg tablets.

## 2017-05-25 NOTE — TELEPHONE ENCOUNTER
Reason for Call:  INR    Who is calling?  Home Care: [facility]    Phone number:  [phone]    Fax number:      Name of caller: Janine    INR Value:  2.2    Are there any other concerns:  Yes: patient has not taken her Coumadin or Lasix in 2 days     Can we leave a detailed message on this number? YES    Phone number patient can be reached at: Home number on file [phone] (home)      Call taken on 5/25/2017 at 12:29 PM by Jo-Ann Bartlett

## 2017-05-28 ENCOUNTER — OFFICE VISIT (OUTPATIENT)
Dept: URGENT CARE | Facility: RETAIL CLINIC | Age: 77
End: 2017-05-28
Payer: COMMERCIAL

## 2017-05-28 VITALS
TEMPERATURE: 97.7 F | HEART RATE: 109 BPM | DIASTOLIC BLOOD PRESSURE: 79 MMHG | SYSTOLIC BLOOD PRESSURE: 129 MMHG | OXYGEN SATURATION: 94 %

## 2017-05-28 DIAGNOSIS — L03.119 CELLULITIS AND ABSCESS OF LEG: Primary | ICD-10-CM

## 2017-05-28 DIAGNOSIS — L02.419 CELLULITIS AND ABSCESS OF LEG: Primary | ICD-10-CM

## 2017-05-28 PROCEDURE — 99203 OFFICE O/P NEW LOW 30 MIN: CPT | Performed by: NURSE PRACTITIONER

## 2017-05-28 RX ORDER — CEPHALEXIN 500 MG/1
500 CAPSULE ORAL 3 TIMES DAILY
Qty: 42 CAPSULE | Refills: 0 | Status: ON HOLD | OUTPATIENT
Start: 2017-05-28 | End: 2017-06-06

## 2017-05-28 NOTE — NURSING NOTE
"Chief Complaint   Patient presents with     Derm Problem     both legs, swollen for a few weeks       Initial /79  Pulse 109  Temp 97.7  F (36.5  C) (Oral)  SpO2 94% Estimated body mass index is 34.67 kg/(m^2) as calculated from the following:    Height as of 5/18/17: 5' 4\" (1.626 m).    Weight as of 5/24/17: 202 lb (91.6 kg).  Medication Reconciliation: complete   Jing Perez      "

## 2017-05-28 NOTE — MR AVS SNAPSHOT
After Visit Summary   5/28/2017    Kathryn Banks    MRN: [id number]           Patient Information     Date Of Birth          1940        Visit Information        Provider Department      5/28/2017 12:30 PM Arash Hale APRN CNP [facility]        Today's Diagnoses     Cellulitis and abscess of leg    -  1       Follow-ups after your visit        Your next 10 appointments already scheduled     Jun 07, 2017  8:30 AM CDT   Ech Hardik with SHECHR2   [facility] Radiology ([facility])    [address] [place] [address]   [phone]           1.  Please bring or wear a comfortable two-piece outfit. 2.  Arrival time: -   [facility]:  arrive 75 minutes prior to examination time. -   [facility]:  arrive 90 minutes prior to examination time. -   [facility]:   arrive 15 minutes prior to examination time. 3.  Plan to have someone here to drive you home after the test. -   Someone should stay with you for 6 hours after your test. 4.  No food or drink: -   6 hours before the test 5.  If you take antacids or water pills (diuretics): Do not take them until after your test. You may take blood pressure medicine with a few sips of water. 6.  If you have diabetes: -   Morning slots preferred -   If you take insulin, call your diabetes care team. Ask if you should take a   dose the morning of your test. -   If you take diabetes medicine by mouth, don't take it on the morning of your test. Bring it with you to take after the test. (If you have questions, call your diabetes care team.) 7.  Bring a list of any medicines you are taking. 8.  Do not drive for 24 hours after the test. 9.   A responsible adult must stay with you for 24 hours after the test.  10.  For any questions that cannot be answered, please contact the ordering physician            Jun 07, 2017  9:30 AM CDT   Ep 90 Minute with SHCVR4   [facility] Cardiac Catheterization Lab  "([facility])    [address]  [place] [address] [phone]              Who to contact     You can reach your care team any time of the day by calling [phone].  Notification of test results:  If you have an abnormal lab result, we will notify you by phone as soon as possible.         Additional Information About Your Visit        MyChart Information     [facility] lets you send messages to your doctor, view your test results, renew your prescriptions, schedule appointments and more. To sign up, go to [email or web address] . Click on \"Log in\" on the left side of the screen, which will take you to the Welcome page. Then click on \"Sign up Now\" on the right side of the page.     You will be asked to enter the access code listed below, as well as some personal information. Please follow the directions to create your username and password.     Your access code is: [email or web address]  Expires: 2017 11:18 AM     Your access code will  in 90 days. If you need help or a new code, please call your [place] clinic or [phone].        Care EveryWhere ID     This is your Care EveryWhere ID. This could be used by other organizations to access your [place] medical records  [id number]        Your Vitals Were     Pulse Temperature Pulse Oximetry             109 97.7  F (36.5  C) (Oral) 94%          Blood Pressure from Last 3 Encounters:   17 129/79   17 118/78   17 126/70    Weight from Last 3 Encounters:   17 202 lb (91.6 kg)   17 181 lb 12.8 oz (82.5 kg)   05/15/17 185 lb (83.9 kg)              Today, you had the following     No orders found for display         Today's Medication Changes          These changes are accurate as of: 17  1:03 PM.  If you have any questions, ask your nurse or doctor.               Start taking these medicines.        Dose/Directions    cephALEXin 500 MG capsule   Commonly known as:  KEFLEX   Used for:  Cellulitis and abscess " of leg   Started by:  Arash Hale, RHODA CNP        Dose:  500 mg   Take 1 capsule (500 mg) by mouth 3 times daily for 14 days   Quantity:  42 capsule   Refills:  0            Where to get your medicines      These medications were sent to [facility] - [place] - [address]  [address], [facility] [address]     Phone:  [phone]     cephALEXin 500 MG capsule                Primary Care Provider Office Phone # Fax #    Dheeraj Jj [phone]       [facility] [address]   [facility] [place] [address]        Thank you!     Thank you for choosing [facility]  for your care. Our goal is always to provide you with excellent care. Hearing back from our patients is one way we can continue to improve our services. Please take a few minutes to complete the written survey that you may receive in the mail after your visit with us. Thank you!             Your Updated Medication List - Protect others around you: Learn how to safely use, store and throw away your medicines at www.disposemymeds.org.          This list is accurate as of: 5/28/17  1:03 PM.  Always use your most recent med list.                   Brand Name Dispense Instructions for use    calcium carbonate-vitamin D 500-400 MG-UNIT Tabs per tablet     180 tablet    Take 1 tablet by mouth 3 times daily       cephALEXin 500 MG capsule    KEFLEX    42 capsule    Take 1 capsule (500 mg) by mouth 3 times daily for 14 days       cyanocobalamin 1000 MCG/ML injection    VITAMIN B12    1 mL    Inject 1 mL (1,000 mcg) into the muscle every 30 days       diltiazem 120 MG 24 hr capsule    CARDIZEM CD    60 capsule    Take 2 capsules (240 mg) by mouth daily       ferrous sulfate 325 (65 FE) MG tablet    IRON    100 tablet    Take 1 tablet (325 mg) by mouth daily (with breakfast)       furosemide 40 MG tablet    LASIX    90 tablet    40 mg a day       GABAPENTIN PO      Take 300 mg by mouth daily        GLIPIZIDE XL PO      Take 10 mg by mouth daily       isosorbide mononitrate 30 MG 24 hr tablet    IMDUR    270 tablet    Take 3 tablets (90 mg) by mouth daily       metoprolol 50 MG tablet    LOPRESSOR    60 tablet    Take 1.5 tablets (75 mg) by mouth 2 times daily       nitroglycerin 0.4 MG sublingual tablet    NITROSTAT    25 tablet    Place 1 tablet under the tongue See Admin Instructions. for chest pain       order for DME     1 Device    Equipment being ordered: cpap machine, mask, humidifier, tubing and filters       pramipexole 1 MG tablet    MIRAPEX    270 tablet    TAKE 3 TABLETS BY MOUTH EVERY EVENING       PRAVASTATIN SODIUM PO      Take 40 mg by mouth daily       WARFARIN SODIUM PO      Give 1mg by mouth Monday and Tuesday. 2.5mg by mouth on Wednesday Recheck INR on Thursday 5/18

## 2017-05-28 NOTE — PROGRESS NOTES
[facility] clinic note    SUBJECTIVE:  Kathryn Banks is a 77 year old female who presents to [facility] clinic with chief complaint of a rash/cellulitus.  Onset of rash/cellulitus was 2 day(s) ago.   Rash/cellulitus is worsening.  Location of the rash/cellulitus: BLE.  Quality/symptoms of rash/cellulitus: painful /c touch, redness (feet) and weeping   Symptoms are moderate and severe and rash/cellulitus seems to be worsening.  Previous history of a similar rash/cellulitus? Yes: 3-4 years ago.  Recent exposure history: none known    Associated symptoms include: cough, Afib, SOB, Malaise.    Current Outpatient Prescriptions   Medication     diltiazem (CARDIZEM CD) 120 MG 24 hr capsule     isosorbide mononitrate (IMDUR) 30 MG 24 hr tablet     GLIPIZIDE XL PO     PRAVASTATIN SODIUM PO     GABAPENTIN PO     WARFARIN SODIUM PO     metoprolol (LOPRESSOR) 50 MG tablet     pramipexole (MIRAPEX) 1 MG tablet     furosemide (LASIX) 40 MG tablet     cyanocobalamin (VITAMIN B12) 1000 MCG/ML injection     ferrous sulfate (IRON) 325 (65 FE) MG tablet     calcium carbonate-vitamin D 500-400 MG-UNIT TABS tablt     ORDER FOR DME     nitroglycerin (NITROSTAT) 0.4 MG SL tablet     No current facility-administered medications for this visit.        PAST MEDICAL HISTORY:   Past Medical History:   Diagnosis Date     Carpal tunnel syndrome      Coronary atherosclerosis of native coronary artery 2006    5 vessel CABG     OBSTRUCTIVE SLEEP APNEA     using CPAP     Osteoarthrosis, unspecified whether generalized or localized, unspecified site     generalized arthritis, particularly in her hands and feet         Other and combined forms of senile cataract 2000    Bilateral     Other and unspecified hyperlipidemia      RESTLESS LEGS SYNDROME      TENOSYNOV HAND/WRIST NEC 6/30/2006     Type II or unspecified type diabetes mellitus without mention of complication, not stated as uncontrolled 2001     Diabetes mellitus/pt is diet controlled with weight loss     Typical atrial flutter (H) 2007     Unspecified essential hypertension        PAST SURGICAL HISTORY:   Past Surgical History:   Procedure Laterality Date     ANGIOPLASTY       C APPENDECTOMY       C CABG, VEIN, FIVE      SVG x 4 and LIMA to LAD     C SOTO W/O FACETEC FORAMOT/DSKC  VRT SEG, LUMBAR  1968     C REMV CATARACT INTRACAP,INSERT LENS      Bilateral     C TOTAL ABDOM HYSTERECTOMY       - fibroids     C VAGOTOMY/PYLOROPLASTY,SELECT  1970     COLONOSCOPY  12/3/2007     COLONOSCOPY  2014    Procedure: COLONOSCOPY;  Colonoscopy;  Surgeon: Zack Stearns MD;  Location:  GI     CYSTOSCOPY  09    [facility]     ENDOSCOPY  2000    Upper GI     HC COLONOSCOPY THRU STOMA, DIAGNOSTIC  10/7/04    poor prep, repeat in 2-3 years     HC COLONOSCOPY W/WO BRUSH/WASH  10/31/07    Repeat-poor quality prep     HC REMOVAL OF OVARY/TUBE(S)      Salpingo-Oophorectomy, Unilateral     HC REVISE MEDIAN N/CARPAL TUNNEL SURG      Carpal tunnel release     HC UGI ENDOSCOPY DIAG W BIOPSY  10/31/07     HC UGI ENDOSCOPY, SIMPLE EXAM  12/3/2007     OPEN REDUCTION INTERNAL FIXATION HIP NAILING Left 7/3/2016    Procedure: OPEN REDUCTION INTERNAL FIXATION HIP NAILING;  Surgeon: Lake Schulz MD;  Location:  OR       FAMILY HISTORY:   Family History   Problem Relation Age of Onset     DIABETES Father      AODM     Neurologic Disorder Father      Parkinson's     Blood Disease Father       from blood clot from leg to lung immediately after hip fracture     DIABETES Paternal Grandmother      adult onset     DIABETES Maternal Grandmother      adult onset     Hypertension No family hx of      CEREBROVASCULAR DISEASE No family hx of        SOCIAL HISTORY:   Social History   Substance Use Topics     Smoking status: Former Smoker     Years: 10.00     Quit date: 1969     Smokeless tobacco: Never Used     Alcohol use 0.0 oz/week     0  Standard drinks or equivalent per week      Comment: occasional- 2 drinks per month       ROS:  Has significant Hx as can be seen in chart.  Review of systems negative except as stated above.    EXAM:   Vitals:    05/28/17 1239   BP: 129/79   Pulse: 109   Temp: 97.7  F (36.5  C)   TempSrc: Oral   SpO2: 94%     GENERAL: alert, no acute distress.  SKIN: Rash description:    Distribution: localized  Location: BLE    Color: red,  Lesion type: macular, ulceration, BLE down to the feet with tenderness, swelling and weeping  GENERAL APPEARANCE: alert, moderate distress, cooperative and over weight  EYES: EOMI,  PERRL, conjunctiva clear  RESP: lungs clear to auscultation - no rales, rhonchi or wheezes  CV: some irregular beats    ASSESSMENT:  Cellulitis and abscess of leg      PLAN:  Keflex and a review of home Tx's  Treatment of pruritus can be difficult and often frustrating. Specific treatments exist for some, but not all.  Antihistamines are the most widely utilized agents for pruritus. (such as Benadryl & Zyrtec).  Appropriate skin care is imperative.  Avoidance of scratching, and therefore secondary skin irritation and perpetuation of the itch-scratch cycle, is also important.    OTC Treatments were reviewed with Kathryn Banks  Patient informed to F/U with PCP if symptoms worsen or do not resolve.    Information on the above diagnosis was given to the patient.  Reassurance was given to the patient.  Tylenol or Ibuprofen for pain, fever    If not improving Follow up at:  [facility] [phone]    Arash Hale MSN, APRN, Family NP-C  [facility]

## 2017-05-30 ENCOUNTER — TELEPHONE (OUTPATIENT)
Dept: INTERNAL MEDICINE | Facility: CLINIC | Age: 77
End: 2017-05-30

## 2017-05-30 ENCOUNTER — ANTICOAGULATION THERAPY VISIT (OUTPATIENT)
Dept: ANTICOAGULATION | Facility: CLINIC | Age: 77
End: 2017-05-30

## 2017-05-30 DIAGNOSIS — Z79.01 LONG-TERM (CURRENT) USE OF ANTICOAGULANTS: ICD-10-CM

## 2017-05-30 LAB — INR PPP: 6.5

## 2017-05-30 NOTE — PROGRESS NOTES
ANTICOAGULATION FOLLOW-UP CLINIC VISIT    Patient Name:  Kathryn Banks  Date:  5/30/2017  Contact Type:  Telephone/ VELASQUEZ Hahn nurse    SUBJECTIVE:     Patient Findings     Positives Change in medications (keflex 500 mg BID x14 days (5/28-6/11)- can increase INR per [employer]), No Problem Findings    Comments Reason for Call:  INR     Who is calling?  Home Care:      Phone number:  [phone]     Fax number:       Name of caller: Selma     INR Value:  6.5     Are there any other concerns:  Yes: Started an antibiotic over the wknd     Can we leave a detailed message on this number? YES     Phone number patient can be reached at: Other phone number:  [phone]        Call taken on 5/30/2017 at 3:08 PM by Josephine Tejeda              OBJECTIVE    INR   Date Value Ref Range Status   05/30/2017 6.5  Final       ASSESSMENT / PLAN  INR assessment SUPRA    Recheck INR In: 3 DAYS    INR Location Home[facility] INR      Anticoagulation Summary as of 5/30/2017     INR goal 2.0-3.0   Today's INR 6.5!   Maintenance plan 7.5 mg (2.5 mg x 3) on Fri; 5 mg (2.5 mg x 2) all other days   Full instructions 5/30: Hold; 5/31: Hold; 6/1: 2.5 mg; Otherwise 7.5 mg on Fri; 5 mg all other days   Weekly total 37.5 mg   Plan last modified Nicki Calvillo, RN (4/11/2017)   Next INR check 6/2/2017   Target end date     Indications   Long-term (current) use of anticoagulants [Z79.01] [Z79.01]  Atrial fibrillation (H) (Resolved) [I48.91]  Paroxysmal atrial fibrillation (H) (Resolved) [I48.0]         Anticoagulation Episode Summary     INR check location     Preferred lab     Send INR reminders to [facility]I    Comments 2.5 mg tablets, pm dose. Alere home monitor      Anticoagulation Care Providers     Provider Role Specialty Phone number    Dheeraj Jj MD [facility] [phone]            See the Encounter Report to view Anticoagulation Flowsheet and Dosing Calendar (Go to Encounters tab in chart review, and find the  Anticoagulation Therapy Visit)    Dosage adjustment made based on physician directed care plan.    Hold Tues, Wed and take 2.5 mg Thurs, recheck on Friday      Zo Norwood RN

## 2017-05-30 NOTE — TELEPHONE ENCOUNTER
Reason for Call:  INR    Who is calling?  Home Care:     Phone number:  [phone]    Fax number:      Name of caller: Selma    INR Value:  6.5    Are there any other concerns:  Yes: Started an antibiotic over the wknd    Can we leave a detailed message on this number? YES    Phone number patient can be reached at: Other phone number:  [phone]      Call taken on 5/30/2017 at 3:08 PM by Josephine Tejeda

## 2017-05-30 NOTE — MR AVS SNAPSHOT
Kathryn MICKY Banks   5/30/2017   Anticoagulation Therapy Visit    Description:  77 year old female   Provider:  Dheeraj Jj MD   Department:   Anticoag           INR as of 5/30/2017     Today's INR 6.5!      Anticoagulation Summary as of 5/30/2017     INR goal 2.0-3.0   Today's INR 6.5!   Full instructions 5/30: Hold; 5/31: Hold; 6/1: 2.5 mg; Otherwise 7.5 mg on Fri; 5 mg all other days   Next INR check 6/2/2017    Indications   Long-term (current) use of anticoagulants [Z79.01] [Z79.01]  Atrial fibrillation (H) (Resolved) [I48.91]  Paroxysmal atrial fibrillation (H) (Resolved) [I48.0]         Contact Numbers     Clinic Number:         May 2017 Details    Sun Mon Tue Wed Thu Fri Sat      1               2               3               4               5               6                 7               8               9               10               11               12               13                 14               15               16               17               18               19               20                 21               22               23               24               25               26               27                 28               29               30      Hold   See details      31      Hold             Date Details   05/30 This INR check               How to take your warfarin dose     Hold Do not take your warfarin dose. See the Details table to the right for additional instructions.                June 2017 Details    Sun Mon Tue Wed Thu Fri Sat         1      2.5 mg         2            3                 4               5               6               7               8               9               10                 11               12               13               14               15               16               17                 18               19               20               21               22               23               24                 25               26                27               28               29               30                 Date Details   No additional details    Date of next INR:  6/2/2017         How to take your warfarin dose     To take:  2.5 mg Take 1 of the 2.5 mg tablets.    To take:  7.5 mg Take 3 of the 2.5 mg tablets.

## 2017-05-31 ENCOUNTER — HOSPITAL ENCOUNTER (INPATIENT)
Facility: CLINIC | Age: 77
LOS: 6 days | Discharge: HOME HEALTH | DRG: 683 | End: 2017-06-06
Attending: EMERGENCY MEDICINE | Admitting: INTERNAL MEDICINE
Payer: MEDICARE

## 2017-05-31 ENCOUNTER — APPOINTMENT (OUTPATIENT)
Dept: GENERAL RADIOLOGY | Facility: CLINIC | Age: 77
DRG: 683 | End: 2017-05-31
Attending: EMERGENCY MEDICINE
Payer: MEDICARE

## 2017-05-31 DIAGNOSIS — I50.9 ACUTE ON CHRONIC CONGESTIVE HEART FAILURE, UNSPECIFIED CONGESTIVE HEART FAILURE TYPE: ICD-10-CM

## 2017-05-31 DIAGNOSIS — I48.91 ATRIAL FIBRILLATION WITH RAPID VENTRICULAR RESPONSE (H): ICD-10-CM

## 2017-05-31 DIAGNOSIS — R60.1 GENERALIZED EDEMA: ICD-10-CM

## 2017-05-31 DIAGNOSIS — R06.00 DYSPNEA, UNSPECIFIED TYPE: ICD-10-CM

## 2017-05-31 DIAGNOSIS — Z79.01 LONG-TERM (CURRENT) USE OF ANTICOAGULANTS: Primary | ICD-10-CM

## 2017-05-31 PROBLEM — I50.33 ACUTE ON CHRONIC DIASTOLIC CONGESTIVE HEART FAILURE (H): Status: ACTIVE | Noted: 2017-05-31

## 2017-05-31 PROBLEM — I48.92 ATRIAL FLUTTER (H): Status: ACTIVE | Noted: 2017-05-31

## 2017-05-31 LAB
ALBUMIN SERPL-MCNC: 2.5 G/DL (ref 3.4–5)
ALBUMIN UR-MCNC: NEGATIVE MG/DL
ALP SERPL-CCNC: 216 U/L (ref 40–150)
ALT SERPL W P-5'-P-CCNC: 33 U/L (ref 0–50)
ANION GAP SERPL CALCULATED.3IONS-SCNC: 8 MMOL/L (ref 3–14)
APPEARANCE UR: CLEAR
AST SERPL W P-5'-P-CCNC: 21 U/L (ref 0–45)
BASE DEFICIT BLDA-SCNC: 6.6 MMOL/L
BASE DEFICIT BLDV-SCNC: 4.5 MMOL/L
BASOPHILS # BLD AUTO: 0.1 10E9/L (ref 0–0.2)
BASOPHILS NFR BLD AUTO: 0.6 %
BILIRUB SERPL-MCNC: 0.3 MG/DL (ref 0.2–1.3)
BILIRUB UR QL STRIP: NEGATIVE
BUN SERPL-MCNC: 47 MG/DL (ref 7–30)
CALCIUM SERPL-MCNC: 7.2 MG/DL (ref 8.5–10.1)
CHLORIDE SERPL-SCNC: 116 MMOL/L (ref 94–109)
CO2 SERPL-SCNC: 23 MMOL/L (ref 20–32)
COLOR UR AUTO: YELLOW
CREAT SERPL-MCNC: 2.29 MG/DL (ref 0.52–1.04)
DIFFERENTIAL METHOD BLD: ABNORMAL
EOSINOPHIL # BLD AUTO: 0.1 10E9/L (ref 0–0.7)
EOSINOPHIL NFR BLD AUTO: 1.4 %
ERYTHROCYTE [DISTWIDTH] IN BLOOD BY AUTOMATED COUNT: 18.8 % (ref 10–15)
GFR SERPL CREATININE-BSD FRML MDRD: 21 ML/MIN/1.7M2
GLUCOSE BLDC GLUCOMTR-MCNC: 206 MG/DL (ref 70–99)
GLUCOSE SERPL-MCNC: 172 MG/DL (ref 70–99)
GLUCOSE UR STRIP-MCNC: NEGATIVE MG/DL
HCO3 BLD-SCNC: 19 MMOL/L (ref 21–28)
HCO3 BLDV-SCNC: 22 MMOL/L (ref 21–28)
HCT VFR BLD AUTO: 35 % (ref 35–47)
HGB BLD-MCNC: 10 G/DL (ref 11.7–15.7)
HGB UR QL STRIP: NEGATIVE
IMM GRANULOCYTES # BLD: 0 10E9/L (ref 0–0.4)
IMM GRANULOCYTES NFR BLD: 0.4 %
INR PPP: 6 (ref 0.86–1.14)
KETONES UR STRIP-MCNC: NEGATIVE MG/DL
LACTATE BLD-SCNC: 0.9 MMOL/L (ref 0.7–2.1)
LEUKOCYTE ESTERASE UR QL STRIP: NEGATIVE
LYMPHOCYTES # BLD AUTO: 1.3 10E9/L (ref 0.8–5.3)
LYMPHOCYTES NFR BLD AUTO: 15.2 %
MCH RBC QN AUTO: 23.3 PG (ref 26.5–33)
MCHC RBC AUTO-ENTMCNC: 28.6 G/DL (ref 31.5–36.5)
MCV RBC AUTO: 82 FL (ref 78–100)
MONOCYTES # BLD AUTO: 0.6 10E9/L (ref 0–1.3)
MONOCYTES NFR BLD AUTO: 7.1 %
NEUTROPHILS # BLD AUTO: 6.4 10E9/L (ref 1.6–8.3)
NEUTROPHILS NFR BLD AUTO: 75.3 %
NITRATE UR QL: NEGATIVE
NT-PROBNP SERPL-MCNC: ABNORMAL PG/ML (ref 0–1800)
O2/TOTAL GAS SETTING VFR VENT: 21 %
O2/TOTAL GAS SETTING VFR VENT: 28 %
PCO2 BLD: 37 MM HG (ref 35–45)
PCO2 BLDV: 50 MM HG (ref 40–50)
PH BLD: 7.32 PH (ref 7.35–7.45)
PH BLDV: 7.26 PH (ref 7.32–7.43)
PH UR STRIP: 5 PH (ref 5–7)
PLATELET # BLD AUTO: 286 10E9/L (ref 150–450)
PO2 BLD: 49 MM HG (ref 80–105)
PO2 BLDV: 20 MM HG (ref 25–47)
POTASSIUM SERPL-SCNC: 4.1 MMOL/L (ref 3.4–5.3)
PROT SERPL-MCNC: 5.6 G/DL (ref 6.8–8.8)
RBC # BLD AUTO: 4.29 10E12/L (ref 3.8–5.2)
SODIUM SERPL-SCNC: 147 MMOL/L (ref 133–144)
SP GR UR STRIP: 1.01 (ref 1–1.03)
TROPONIN I SERPL-MCNC: NORMAL UG/L (ref 0–0.04)
URN SPEC COLLECT METH UR: NORMAL
UROBILINOGEN UR STRIP-MCNC: 0 MG/DL (ref 0–2)
WBC # BLD AUTO: 8.6 10E9/L (ref 4–11)

## 2017-05-31 PROCEDURE — S0171 BUMETANIDE 0.5 MG: HCPCS | Performed by: EMERGENCY MEDICINE

## 2017-05-31 PROCEDURE — 85025 COMPLETE CBC W/AUTO DIFF WBC: CPT | Performed by: EMERGENCY MEDICINE

## 2017-05-31 PROCEDURE — 12000007 ZZH R&B INTERMEDIATE

## 2017-05-31 PROCEDURE — 25000125 ZZHC RX 250: Performed by: EMERGENCY MEDICINE

## 2017-05-31 PROCEDURE — 82803 BLOOD GASES ANY COMBINATION: CPT | Performed by: EMERGENCY MEDICINE

## 2017-05-31 PROCEDURE — 87081 CULTURE SCREEN ONLY: CPT | Performed by: INTERNAL MEDICINE

## 2017-05-31 PROCEDURE — 99223 1ST HOSP IP/OBS HIGH 75: CPT | Mod: AI | Performed by: INTERNAL MEDICINE

## 2017-05-31 PROCEDURE — 96374 THER/PROPH/DIAG INJ IV PUSH: CPT | Performed by: EMERGENCY MEDICINE

## 2017-05-31 PROCEDURE — 51702 INSERT TEMP BLADDER CATH: CPT | Performed by: EMERGENCY MEDICINE

## 2017-05-31 PROCEDURE — 81003 URINALYSIS AUTO W/O SCOPE: CPT | Performed by: EMERGENCY MEDICINE

## 2017-05-31 PROCEDURE — 71020 XR CHEST 2 VW: CPT | Mod: TC

## 2017-05-31 PROCEDURE — 83605 ASSAY OF LACTIC ACID: CPT | Performed by: EMERGENCY MEDICINE

## 2017-05-31 PROCEDURE — 83880 ASSAY OF NATRIURETIC PEPTIDE: CPT | Performed by: EMERGENCY MEDICINE

## 2017-05-31 PROCEDURE — 99285 EMERGENCY DEPT VISIT HI MDM: CPT | Mod: 25 | Performed by: EMERGENCY MEDICINE

## 2017-05-31 PROCEDURE — 80053 COMPREHEN METABOLIC PANEL: CPT | Performed by: EMERGENCY MEDICINE

## 2017-05-31 PROCEDURE — 93010 ELECTROCARDIOGRAM REPORT: CPT | Mod: Z6 | Performed by: EMERGENCY MEDICINE

## 2017-05-31 PROCEDURE — 84300 ASSAY OF URINE SODIUM: CPT | Performed by: INTERNAL MEDICINE

## 2017-05-31 PROCEDURE — 25000128 H RX IP 250 OP 636: Performed by: INTERNAL MEDICINE

## 2017-05-31 PROCEDURE — 82803 BLOOD GASES ANY COMBINATION: CPT | Performed by: INTERNAL MEDICINE

## 2017-05-31 PROCEDURE — 82570 ASSAY OF URINE CREATININE: CPT | Performed by: INTERNAL MEDICINE

## 2017-05-31 PROCEDURE — 00000146 ZZHCL STATISTIC GLUCOSE BY METER IP

## 2017-05-31 PROCEDURE — A9270 NON-COVERED ITEM OR SERVICE: HCPCS | Mod: GY | Performed by: INTERNAL MEDICINE

## 2017-05-31 PROCEDURE — 99285 EMERGENCY DEPT VISIT HI MDM: CPT | Mod: Z6 | Performed by: EMERGENCY MEDICINE

## 2017-05-31 PROCEDURE — 85610 PROTHROMBIN TIME: CPT | Performed by: EMERGENCY MEDICINE

## 2017-05-31 PROCEDURE — 25000132 ZZH RX MED GY IP 250 OP 250 PS 637: Mod: GY | Performed by: INTERNAL MEDICINE

## 2017-05-31 PROCEDURE — 84484 ASSAY OF TROPONIN QUANT: CPT | Performed by: INTERNAL MEDICINE

## 2017-05-31 PROCEDURE — 93005 ELECTROCARDIOGRAM TRACING: CPT | Performed by: EMERGENCY MEDICINE

## 2017-05-31 PROCEDURE — 25000131 ZZH RX MED GY IP 250 OP 636 PS 637: Mod: GY | Performed by: INTERNAL MEDICINE

## 2017-05-31 RX ORDER — GLIPIZIDE 5 MG/1
10 TABLET, FILM COATED, EXTENDED RELEASE ORAL DAILY
Status: DISCONTINUED | OUTPATIENT
Start: 2017-06-01 | End: 2017-06-06 | Stop reason: HOSPADM

## 2017-05-31 RX ORDER — ONDANSETRON 4 MG/1
4 TABLET, ORALLY DISINTEGRATING ORAL EVERY 6 HOURS PRN
Status: DISCONTINUED | OUTPATIENT
Start: 2017-05-31 | End: 2017-06-06 | Stop reason: HOSPADM

## 2017-05-31 RX ORDER — BUMETANIDE 0.25 MG/ML
2 INJECTION INTRAMUSCULAR; INTRAVENOUS ONCE
Status: COMPLETED | OUTPATIENT
Start: 2017-05-31 | End: 2017-05-31

## 2017-05-31 RX ORDER — DILTIAZEM HYDROCHLORIDE 240 MG/1
240 CAPSULE, EXTENDED RELEASE ORAL DAILY
Status: DISCONTINUED | OUTPATIENT
Start: 2017-06-01 | End: 2017-06-06 | Stop reason: HOSPADM

## 2017-05-31 RX ORDER — PROCHLORPERAZINE MALEATE 5 MG
5 TABLET ORAL EVERY 6 HOURS PRN
Status: DISCONTINUED | OUTPATIENT
Start: 2017-05-31 | End: 2017-06-06 | Stop reason: HOSPADM

## 2017-05-31 RX ORDER — NICOTINE POLACRILEX 4 MG
15-30 LOZENGE BUCCAL
Status: DISCONTINUED | OUTPATIENT
Start: 2017-05-31 | End: 2017-06-06 | Stop reason: HOSPADM

## 2017-05-31 RX ORDER — NALOXONE HYDROCHLORIDE 0.4 MG/ML
.1-.4 INJECTION, SOLUTION INTRAMUSCULAR; INTRAVENOUS; SUBCUTANEOUS
Status: DISCONTINUED | OUTPATIENT
Start: 2017-05-31 | End: 2017-05-31

## 2017-05-31 RX ORDER — DEXTROSE MONOHYDRATE 25 G/50ML
25-50 INJECTION, SOLUTION INTRAVENOUS
Status: DISCONTINUED | OUTPATIENT
Start: 2017-05-31 | End: 2017-06-06 | Stop reason: HOSPADM

## 2017-05-31 RX ORDER — NALOXONE HYDROCHLORIDE 0.4 MG/ML
.1-.4 INJECTION, SOLUTION INTRAMUSCULAR; INTRAVENOUS; SUBCUTANEOUS
Status: DISCONTINUED | OUTPATIENT
Start: 2017-05-31 | End: 2017-06-06 | Stop reason: HOSPADM

## 2017-05-31 RX ORDER — PROCHLORPERAZINE 25 MG
12.5 SUPPOSITORY, RECTAL RECTAL EVERY 12 HOURS PRN
Status: DISCONTINUED | OUTPATIENT
Start: 2017-05-31 | End: 2017-06-06 | Stop reason: HOSPADM

## 2017-05-31 RX ORDER — ACETAMINOPHEN 325 MG/1
650 TABLET ORAL EVERY 4 HOURS PRN
Status: DISCONTINUED | OUTPATIENT
Start: 2017-05-31 | End: 2017-06-06 | Stop reason: HOSPADM

## 2017-05-31 RX ORDER — ONDANSETRON 2 MG/ML
4 INJECTION INTRAMUSCULAR; INTRAVENOUS EVERY 6 HOURS PRN
Status: DISCONTINUED | OUTPATIENT
Start: 2017-05-31 | End: 2017-06-06 | Stop reason: HOSPADM

## 2017-05-31 RX ORDER — FERROUS SULFATE 325(65) MG
325 TABLET ORAL
Status: DISCONTINUED | OUTPATIENT
Start: 2017-06-01 | End: 2017-06-06 | Stop reason: HOSPADM

## 2017-05-31 RX ORDER — ISOSORBIDE MONONITRATE 30 MG/1
30 TABLET, EXTENDED RELEASE ORAL DAILY
Status: DISCONTINUED | OUTPATIENT
Start: 2017-06-01 | End: 2017-06-06 | Stop reason: HOSPADM

## 2017-05-31 RX ADMIN — BUMETANIDE 2 MG: 0.25 INJECTION INTRAMUSCULAR; INTRAVENOUS at 16:58

## 2017-05-31 RX ADMIN — FUROSEMIDE 5 MG/HR: 10 INJECTION, SOLUTION INTRAVENOUS at 21:42

## 2017-05-31 RX ADMIN — INSULIN ASPART 2 UNITS: 100 INJECTION, SOLUTION INTRAVENOUS; SUBCUTANEOUS at 21:42

## 2017-05-31 RX ADMIN — METOPROLOL TARTRATE 75 MG: 50 TABLET, FILM COATED ORAL at 22:32

## 2017-05-31 ASSESSMENT — ACTIVITIES OF DAILY LIVING (ADL)
TRANSFERRING: 1-->ASSISTIVE EQUIPMENT
COGNITION: 0 - NO COGNITION ISSUES REPORTED
SWALLOWING: 0-->SWALLOWS FOODS/LIQUIDS WITHOUT DIFFICULTY
NUMBER_OF_TIMES_PATIENT_HAS_FALLEN_WITHIN_LAST_SIX_MONTHS: 1
AMBULATION: 1-->ASSISTIVE EQUIPMENT
BATHING: 1-->ASSISTIVE EQUIPMENT
FALL_HISTORY_WITHIN_LAST_SIX_MONTHS: YES
RETIRED_EATING: 0-->INDEPENDENT
TOILETING: 1-->ASSISTIVE EQUIPMENT
DRESS: 0-->INDEPENDENT
RETIRED_COMMUNICATION: 0-->UNDERSTANDS/COMMUNICATES WITHOUT DIFFICULTY

## 2017-05-31 NOTE — IP AVS SNAPSHOT
MRN:[id number]                      After Visit Summary   5/31/2017    Kathryn Banks    MRN: [id number]           Thank you!     Thank you for choosing [facility] for your care. Our goal is always to provide you with excellent care. Hearing back from our patients is one way we can continue to improve our services. Please take a few minutes to complete the written survey that you may receive in the mail after you visit with us. Thank you!        Patient Information     Date Of Birth          1940        Designated Caregiver       Most Recent Value    Caregiver    Will someone help with your care after discharge? yes    Name of designated caregiver Urbano    Phone number of caregiver [phone]    Caregiver address same as pt      About your hospital stay     You were admitted on:  May 31, 2017 You last received care in the:  [address]    You were discharged on:  June 6, 2017        Reason for your hospital stay       Kathryn was admitted to the hospital for significant weight gain secondary to edema.                  Who to Call     For medical emergencies, please call 911.  For non-urgent questions about your medical care, please call your primary care provider or clinic, [phone]          Attending Provider     Provider Specialty    Jose Bee MD Emergency Medicine    [facility]    Arash Silva MD Internal Medicine    Vanessa, José Luis Figueroa MD Internal Medicine       Primary Care Provider Office Phone # Fax #    Dheeraj Jj [phone]       When to contact your care team       Please call the clinic if develops chest pain, shortness of breath, worsening of the swelling, not able to breath when laying down flat, significant shortness of breath when walking or with any concerns.                  After Care Instructions     Activity       Your activity upon discharge: activity as tolerated            Diet        Follow this diet upon discharge:      Low-salt and low sugar diet as tolerated.            Discharge Instructions                 Follow-up Appointments     Follow-up and recommended labs and tests        Follow up with primary care provider, Dheeraj Jj, within 7 days to evaluate medication change, to evaluate treatment change, for hospital follow- up.                  Your next 10 appointments already scheduled     Jun 12, 2017  3:15 PM CDT   Office Visit with Dheeraj Jj MD   [address] [id number] [phone]           Bring a current list of meds and any records pertaining to this visit.  For Physicals, please bring immunization records and any forms needing to be filled out.  Please arrive 10 minutes early to complete paperwork.            Jul 27, 2017 11:00 AM CDT   Return Visit with Thaddeus King MD   [facility] ([address] [id number] [phone]              Additional Services     Home Care PT Referral for Hospital Discharge       PT to eval and treat    Your provider has ordered home care - physical therapy. If you have not been contacted within 2 days of your discharge please call the department phone number listed on the top of this document.            Home care nursing referral       RN extended hours visit. RN to assess vital signs and weight and home safety.  RN to teach medication management.  Home health aide to help her with daily chore and personal hygiene    Your provider has ordered home care nursing services. If you have not been contacted within 2 days of your discharge please call the inpatient department phone number at [phone] .            INR Clinic Referral           INR Clinic Referral                 Warfarin Instruction     You have started taking a medicine called warfarin. This is a blood-thinning medicine  "(anticoagulant). It helps prevent and treat blood clots.      Before leaving the hospital, make sure you know how much to take and how long to take it.      You will need regular blood tests to make sure your blood is clotting safely. It is very important to see your doctor for regular blood tests.    Talk to your doctor before taking any new medicine (this includes over-the-counter drugs and herbal products). Many medicines can interact with warfarin. This may cause more bleeding or too much clotting.     Eating a lot of vitamin K--found in green, leafy vegetables--can change the way warfarin works in your body. Do NOT avoid these foods. Instead, try to eat the same amount each day.     Bleeding is the most common side-effect of warfarin. You may notice bleeding gums, a bloody nose, bruises and bleeding longer when you cut yourself. See a doctor at once if:   o You cough up blood  o You find blood in your stool (poop)  o You have a deep cut, or a cut that bleeds longer than 10 minutes   o You have a bad cut, hard fall, accident or hit your head (go to urgent care or the emergency room).    For women who can get pregnant: This medicine can harm an unborn baby. Be very careful not to get pregnant while taking this medicine. If you think you might be pregnant, call your doctor right away.    For more information, read \"Guide to Warfarin Therapy,  the booklet you received in the hospital.        General Recommendations To Control Heart Failure When You Get Home     Instructions To Patients and Families: Please read and check off each of these important instructions as you do them when you get home.           Weight and symptoms      ___ Put a scale in your bathroom  ___ Post a weight chart or calendar next to the scale  ___Weigh yourself every day as soon as you get up in the morning. You should only be wearing your pajamas. Write your weight on the chart/calendar.  ___ Bring your weight chart/calendar with you to all " "appointments    ___Call your doctor if you gain 2 pounds in 1 day or 5 pounds in 1 week from your \"dry\" weight (baseline weight). Also call your doctor if you have shortness of breath that gets worse over time, leg swelling or fatigue.         Medicines and diet     ___ Make sure to take your medicines as prescribed.    ___Bring a current list of your medicines and all of your medicine bottles with you to all appointments.    ___ Limit fluids if you still have swelling or shortness of breath, or if your doctor tells you to do so.  ___ Eat less than 2000 mg of sodium (salt) every day. Read food labels, and do not add salt to meals.   ___ Heart healthy diet with low fat and low cholesterol          Activity and suggested lifestyle changes    ___ Stay active. Talk to your doctor about an exercise program that is safe for your heart.    ___ Stop smoking. Reduce alcohol use.      ___ Lose weight if you are overweight. Extra weight puts a lot of stress on the heart.          Control for Leg Swelling   ___ Keep your legs elevated (raised) as needed for swelling. If swelling is uncomfortable or elevation doesn t help, ask your doctor about using ACE wrap or Jobst stockings.          Follow-up appointments   ____ Follow up with your doctor within 7-10 days after discharge.    ___ Make sure to take your medications as prescribed and bring an accurate list of your medications and your weight chart/calendar to your follow up appointment.           Pending Results     No orders found from 5/29/2017 to 6/1/2017.            Statement of Approval     Ordered          06/06/17 1314  I have reviewed and agree with all the recommendations and orders detailed in this document.  EFFECTIVE NOW     Approved and electronically signed by:  Kenji Fish MD             Admission Information     Date & Time Provider Department Dept. Phone    5/31/2017 José Luis Mendez MD [address] Surgical [phone]      Your Vitals Were  " "   Blood Pressure Pulse Temperature Respirations Height Weight    105/68 (BP Location: Left arm) 71 97.3  F (36.3  C) (Oral) 18 1.626 m (5' 4\") 76.5 kg (168 lb 10.4 oz)    Pulse Oximetry BMI (Body Mass Index)                93% 28.95 kg/m2          MyChart Information     [facility] lets you send messages to your doctor, view your test results, renew your prescriptions, schedule appointments and more. To sign up, go to [email or web address] . Click on \"Log in\" on the left side of the screen, which will take you to the Welcome page. Then click on \"Sign up Now\" on the right side of the page.     You will be asked to enter the access code listed below, as well as some personal information. Please follow the directions to create your username and password.     Your access code is: [email or web address]  Expires: 2017 11:18 AM     Your access code will  in 90 days. If you need help or a new code, please call your [facility] clinic or [phone].        Care EveryWhere ID     This is your Care EveryWhere ID. This could be used by other organizations to access your [facility] medical records  [id number]           Review of your medicines      CONTINUE these medicines which may have CHANGED, or have new prescriptions. If we are uncertain of the size of tablets/capsules you have at home, strength may be listed as something that might have changed.        Dose / Directions    calcium carbonate-vitamin D 500-400 MG-UNIT Tabs per tablet   This may have changed:  when to take this   Used for:  Closed intertrochanteric fracture of left hip, initial encounter (H)        Dose:  1 tablet   Take 1 tablet by mouth 3 times daily   Quantity:  180 tablet   Refills:  3       furosemide 40 MG tablet   Commonly known as:  LASIX   This may have changed:    - how much to take  - when to take this  - additional instructions   Used for:  Lymphedema        40 mg a day   Quantity:  90 tablet   Refills:  3       isosorbide mononitrate 30 MG 24 hr " tablet   Commonly known as:  IMDUR   This may have changed:  how much to take   Used for:  Hx of non-ST elevation myocardial infarction (NSTEMI)        Dose:  90 mg   Take 3 tablets (90 mg) by mouth daily   Quantity:  270 tablet   Refills:  3       metoprolol 25 MG tablet   Commonly known as:  LOPRESSOR   This may have changed:    - medication strength  - how much to take   Used for:  Atrial fibrillation with rapid ventricular response (H)        Dose:  25 mg   Take 1 tablet (25 mg) by mouth 2 times daily   Quantity:  60 tablet   Refills:  0       warfarin 2.5 MG tablet   Commonly known as:  COUMADIN   This may have changed:    - medication strength  - how to take this  - additional instructions   Used for:  Atrial fibrillation with rapid ventricular response (H), Long-term (current) use of anticoagulants        Take 1 tablet on 6/7 and 2 tablets on 6/8 and recheck INR on 6/9   Quantity:  10 tablet   Refills:  0         CONTINUE these medicines which have NOT CHANGED        Dose / Directions    cyanocobalamin 1000 MCG/ML injection   Commonly known as:  VITAMIN B12   Used for:  Vitamin B12 deficiency (non anemic)        Dose:  1 mL   Inject 1 mL (1,000 mcg) into the muscle every 30 days   Quantity:  1 mL   Refills:  11       diltiazem 120 MG 24 hr capsule   Commonly known as:  CARDIZEM CD   Used for:  Typical atrial flutter (H)        Dose:  240 mg   Take 2 capsules (240 mg) by mouth daily   Quantity:  60 capsule   Refills:  3       ferrous sulfate 325 (65 FE) MG tablet   Commonly known as:  IRON   Used for:  Anemia, unspecified type        Dose:  325 mg   Take 1 tablet (325 mg) by mouth daily (with breakfast)   Quantity:  100 tablet   Refills:  0       GABAPENTIN PO        Dose:  300 mg   Take 300 mg by mouth 2 times daily   Refills:  0       GLIPIZIDE XL PO        Dose:  10 mg   Take 10 mg by mouth daily   Refills:  0       nitroglycerin 0.4 MG sublingual tablet   Commonly known as:  NITROSTAT   Used for:   Postsurgical aortocoronary bypass status        Dose:  0.4 mg   Place 1 tablet under the tongue See Admin Instructions. for chest pain   Quantity:  25 tablet   Refills:  0       order for DME   Used for:  ANABEL (obstructive sleep apnea)        Equipment being ordered: cpap machine, mask, humidifier, tubing and filters   Quantity:  1 Device   Refills:  0       pramipexole 1 MG tablet   Commonly known as:  MIRAPEX   Used for:  Restless legs syndrome (RLS), Hyperlipidemia LDL goal <100, Hypertension goal BP (blood pressure) < 140/90        TAKE 3 TABLETS BY MOUTH EVERY EVENING   Quantity:  270 tablet   Refills:  2       PRAVASTATIN SODIUM PO        Dose:  40 mg   Take 40 mg by mouth daily   Refills:  0         STOP taking     cephALEXin 500 MG capsule   Commonly known as:  KEFLEX                Where to get your medicines      These medications were sent to [facility] [place] [facility] [address] [facility]   [address], [facility] [address]     Phone:  [phone]     metoprolol 25 MG tablet    warfarin 2.5 MG tablet                Protect others around you: Learn how to safely use, store and throw away your medicines at www.disposemymeds.org.             Medication List: This is a list of all your medications and when to take them. Check marks below indicate your daily home schedule. Keep this list as a reference.      Medications           Morning Afternoon Evening Bedtime As Needed    calcium carbonate-vitamin D 500-400 MG-UNIT Tabs per tablet   Take 1 tablet by mouth 3 times daily                                         cyanocobalamin 1000 MCG/ML injection   Commonly known as:  VITAMIN B12   Inject 1 mL (1,000 mcg) into the muscle every 30 days                                diltiazem 120 MG 24 hr capsule   Commonly known as:  CARDIZEM CD   Take 2 capsules (240 mg) by mouth daily                                   ferrous sulfate 325 (65 FE) MG tablet   Commonly known as:  IRON   Take 1 tablet (325  mg) by mouth daily (with breakfast)   Last time this was given:  325 mg on 6/6/2017  9:38 AM                                   furosemide 40 MG tablet   Commonly known as:  LASIX   40 mg a day   Last time this was given:  20 mg on 6/6/2017 10:15 AM                                   GABAPENTIN PO   Take 300 mg by mouth 2 times daily                                      GLIPIZIDE XL PO   Take 10 mg by mouth daily   Last time this was given:  10 mg on 6/6/2017  9:38 AM                                   isosorbide mononitrate 30 MG 24 hr tablet   Commonly known as:  IMDUR   Take 3 tablets (90 mg) by mouth daily   Last time this was given:  30 mg on 6/6/2017  9:40 AM                                   metoprolol 25 MG tablet   Commonly known as:  LOPRESSOR   Take 1 tablet (25 mg) by mouth 2 times daily   Last time this was given:  12.5 mg on 6/6/2017  9:40 AM                                      nitroglycerin 0.4 MG sublingual tablet   Commonly known as:  NITROSTAT   Place 1 tablet under the tongue See Admin Instructions. for chest pain                                   order for DME   Equipment being ordered: cpap machine, mask, humidifier, tubing and filters                                pramipexole 1 MG tablet   Commonly known as:  MIRAPEX   TAKE 3 TABLETS BY MOUTH EVERY EVENING   Last time this was given:  3 mg on 6/6/2017 12:08 AM                                   PRAVASTATIN SODIUM PO   Take 40 mg by mouth daily                                   warfarin 2.5 MG tablet   Commonly known as:  COUMADIN   Take 1 tablet on 6/7 and 2 tablets on 6/8 and recheck INR on 6/9   Last time this was given:  0.5 mg on 6/5/2017  5:27 PM

## 2017-05-31 NOTE — LETTER
Transition Communication Hand-off for Care Transitions to Next Level of Care Provider    Name: Kathryn Banks  MRN #: [id number]  Primary Care Provider: Dheeraj Jj  Primary Care MD Name: Parviz  Primary Clinic: [facility] [address] [place] [address]  Primary Care Clinic Name: [facility]  Reason for Hospitalization:  Generalized edema [R60.1]  Acute on chronic congestive heart failure, unspecified congestive heart failure type (H) [I50.9]  Dyspnea, unspecified type [R06.00]  Admit Date/Time: 5/31/2017  3:33 PM  Discharge Date: 06/06/17    Payor Source: Payor: BCBS / Plan: BC[employer] PLATINUM BLUE / Product Type: PPO /     Readmission Assessment Measure (ANDREW) Risk Score/category: Average            Reason for Communication Hand-off Referral: Admission diagnoses: CHF    Discharge Plan: Pt declines TCU placement. Pt d/c to home with  and will resume [facility] services of RN, Aide, PT, OT. Order to [facility] care nurse also.        Concern for non-adherence with plan of care: Yes. Patient may not follow through with d/c orders due to some cognitive deficits.  See OT evaluation in EPIC.    Discharge Needs Assessment:  Needs       Most Recent Value    Equipment Currently Used at Home grab bar, walker, rolling, dressing device    Transportation Available car, family or friend will provide    # of Referrals Placed by [facility]          Already enrolled in Tele-monitoring program and name of program:  Unknown   Follow-up specialty is recommended: Yes    Follow-up plan:  Future Appointments  Date Time Provider Department Center   [email or web address]   6/7/2017 9:30 AM [id number]   6/12/2017 3:15 PM Dheeraj Jj MD [facility]   7/27/2017 11:00 AM Thaddeus King MD [facility]       Any outstanding tests or procedures:        Referrals     Future Labs/Procedures    Home care nursing referral     Comments:    RN extended hours  visit. RN to assess vital signs and weight and home safety.  RN to teach medication management.  Home health aide to help her with daily chore and personal hygiene    Your provider has ordered home care nursing services. If you have not been contacted within 2 days of your discharge please call the inpatient department phone number at [phone] .    Home Care PT Referral for Hospital Discharge     Comments:    PT to eval and treat    Your provider has ordered home care - physical therapy. If you have not been contacted within 2 days of your discharge please call the department phone number listed on the top of this document.    INR Clinic Referral     INR Clinic Referral             Key Recommendations:  Attend all follow up appointments.     BON Hines   [phone]    AVS/Discharge Summary is the source of truth; this is a helpful guide for improved communication of patient story

## 2017-05-31 NOTE — IP AVS SNAPSHOT
[address] [facility]    [address] [facility]     [place] [address]    Phone:  [phone]                                       After Visit Summary   5/31/2017    Kathryn Banks    MRN: [id number]           After Visit Summary Signature Page     I have received my discharge instructions, and my questions have been answered. I have discussed any challenges I see with this plan with the nurse or doctor.    ..........................................................................................................................................  Patient/Patient Representative Signature      ..........................................................................................................................................  Patient Representative Print Name and Relationship to Patient    ..................................................               ................................................  Date                                            Time    ..........................................................................................................................................  Reviewed by Signature/Title    ...................................................              ..............................................  Date                                                            Time

## 2017-05-31 NOTE — ED NOTES
ED Nursing criteria listed below was addressed during verbal handoff:     Abnormal vitals: Yes  Abnormal results: Yes  Med Reconciliation completed: Yes  Meds given in ED: Yes  Any Overdue Meds: No  Core Measures: No  Isolation: No  Special needs: No  Skin assessment: Yes    Observation Patient  Education provided: N/A

## 2017-05-31 NOTE — ED NOTES
She has a history of a fib and is here today with shortness of breath and swelling in her legs and low heart rate

## 2017-05-31 NOTE — ED PROVIDER NOTES
History     Chief Complaint   Patient presents with     Shortness of Breath     HPI  Kathryn Banks is a 77 year old female who presents with complaints of shortness of breath, increased leg swelling and 20 pounds of weight gain.  Denies chest pain.  Denies fever or chills.  Denies abdominal pain or nausea.  Patient has a long history of A. fib/flutter and has been seen for previous episodes requiring admission IV treatment.  Most initially she also had a urinary tract infection urosepsis.  She currently denies urinary symptoms.  She's had no diarrhea.  Patient saw her primary doctor on Friday with concerns for increased peripheral edema and he doubled her Lasix to 80 mg per the patient.  Patient has been scheduled for a transesophageal echo and possible EP ablation procedure by Dr. King.  Additional risk factors include previous NSTEMI, chronic renal disease, anemia, lymphedema, diabetes, and hypertension.    I have reviewed the Medications, Allergies, Past Medical and Surgical History, and Social History in the Epic system.    Review of Systems all other systems reviewed and are negative.  Past Medical History:   Diagnosis Date     Carpal tunnel syndrome      Coronary atherosclerosis of native coronary artery 2006    5 vessel CABG     OBSTRUCTIVE SLEEP APNEA     using CPAP     Osteoarthrosis, unspecified whether generalized or localized, unspecified site     generalized arthritis, particularly in her hands and feet         Other and combined forms of senile cataract 2000    Bilateral     Other and unspecified hyperlipidemia      RESTLESS LEGS SYNDROME      TENOSYNOV HAND/WRIST NEC 6/30/2006     Type II or unspecified type diabetes mellitus without mention of complication, not stated as uncontrolled 2001    Diabetes mellitus/pt is diet controlled with weight loss     Typical atrial flutter (H) 01/17/2007     Unspecified essential hypertension      Patient Active Problem List   Diagnosis     Restless leg syndrome      Sleep apnea     Lipoma of other skin and subcutaneous tissue     ESOPHAGEAL REFLUX     Postsurgical aortocoronary bypass status     History of peptic ulcer disease     Chronic kidney disease, stage III (moderate)     Edema     Kidney stone     Hyperlipidemia LDL goal <100     Advanced directives, counseling/discussion     CAD - NSTEMI, CABG x 5 2007, angio in 2013 with patent grafts other than occluded graft to PL     Hx of non-ST elevation myocardial infarction (NSTEMI)     Health Care Home     Lymphedema     Anemia     Renal failure     Osteoarthritis of hip     Non morbid obesity, unspecified obesity type     Type 2 diabetes mellitus with other circulatory complications (H)     Long-term (current) use of anticoagulants [Z79.01]     Acute kidney injury (H)     Essential hypertension with goal blood pressure less than 140/90     Atrial fibrillation with rapid ventricular response (H)     Rash - redness medial thighs     Pulmonary hypertension (H)     No current facility-administered medications for this encounter.      Current Outpatient Prescriptions   Medication     cephALEXin (KEFLEX) 500 MG capsule     diltiazem (CARDIZEM CD) 120 MG 24 hr capsule     isosorbide mononitrate (IMDUR) 30 MG 24 hr tablet     GLIPIZIDE XL PO     PRAVASTATIN SODIUM PO     GABAPENTIN PO     WARFARIN SODIUM PO     metoprolol (LOPRESSOR) 50 MG tablet     pramipexole (MIRAPEX) 1 MG tablet     furosemide (LASIX) 40 MG tablet     cyanocobalamin (VITAMIN B12) 1000 MCG/ML injection     ferrous sulfate (IRON) 325 (65 FE) MG tablet     calcium carbonate-vitamin D 500-400 MG-UNIT TABS tablt     ORDER FOR DME     nitroglycerin (NITROSTAT) 0.4 MG SL tablet        Allergies   Allergen Reactions     Ciprofloxacin Nausea and Vomiting     Zofran did not help     Oxycodone Visual Disturbance     Delusions, blackouts      Lisinopril Cough     Penicillins Rash     Sulfa Drugs GI Disturbance     LOSS OF TASTE     Social History     Social History      Marital status:      Spouse name: Urbano Banks     Number of children: 2     Years of education: 13     Occupational History     Ministry coordinator      [employer]     Social History Main Topics     Smoking status: Former Smoker     Years: 10.00     Quit date: 1969     Smokeless tobacco: Never Used     Alcohol use 0.0 oz/week     0 Standard drinks or equivalent per week      Comment: occasional- 2 drinks per month     Drug use: No     Sexual activity: Yes     Birth control/ protection: Surgical     Other Topics Concern     Parent/Sibling W/ Cabg, Mi Or Angioplasty Before 65f 55m? No     Social History Narrative     Family History   Problem Relation Age of Onset     DIABETES Father      AODM     Neurologic Disorder Father      Parkinson's     Blood Disease Father       from blood clot from leg to lung immediately after hip fracture     DIABETES Paternal Grandmother      adult onset     DIABETES Maternal Grandmother      adult onset     Hypertension No family hx of      CEREBROVASCULAR DISEASE No family hx of        Physical Exam   BP: 92/75  Pulse: 68  Temp: 96.7  F (35.9  C)  Resp: 16  SpO2: 97 %  Physical Exam Gen. alert cooperative female in mild to moderate respiratory distress.  She is able to speak in complete sentences however.  HEENT shows scleral injection and erythema in the medial aspect of her right eye.  No foreign body is noted.  Pulses are equal and reactive to light and accommodation.  Extraocular motions are intact.  Oral mucosa is moist.  Speech is clear and concise.  Neck she has JVD when she is lying at 30 .  Lungs reveal coarse breath sounds throughout all lung fields with no wheezes.  Cardiac regular at 60 with no murmur but loud breath sounds and emergency room background noise make it difficult to auscultate.  Abdomen is obese with active bowel sounds.  On palpation she has no tenderness.  She does have pitting edema in the lower abdomen and  pelvic region.  On her left breast she has some edema and some erythema and excoriation under the breast.  On her lower extremities she has tense edema from her buttock to her feet.  Palpable pulses.  She is clear drainage from both lower legs.  No obvious ulcers.  Neurologically is grossly nonfocal.    ED Course     ED Course     Procedures             EKG Interpretation:      Interpreted by Jose Bee  Time reviewed: 15:50  Symptoms at time of EKG: Dyspnea   Rhythm: atrial fibrillation - controlled  Rate: Normal  Axis: Normal  Ectopy: none  Conduction: Low voltage   ST Segments/ T Waves: Non-specific ST-T wave changes  Q Waves: none  Comparison to prior: Compared to previous EKG patient was in a fib/flutter with RVR.    Clinical Impression: atrial fibrillation (chronic)    Results for orders placed or performed during the hospital encounter of 05/31/17   XR Chest 2 Views    Narrative    XR CHEST 2 VW 5/31/2017 4:27 PM    COMPARISON: 4/28/2017    HISTORY: Dyspnea      Impression    IMPRESSION: Cardiac silhouette within normal limits. Median sternotomy  wires appear intact. Mild pulmonary edema as well as focal airspace  opacity at the right base, possibly representing aspiration or  infection. Likely trace bilateral pleural effusions. No pneumothorax  on either side.    ANUP DURAND     *Note: Due to a large number of results and/or encounters for the requested time period, some results have not been displayed. A complete set of results can be found in Results Review.                 Critical Care time:  none               Labs Ordered and Resulted from Time of ED Arrival Up to the Time of Departure from the ED   CBC WITH PLATELETS DIFFERENTIAL - Abnormal; Notable for the following:        Result Value    Hemoglobin 10.0 (*)     MCH 23.3 (*)     MCHC 28.6 (*)     RDW 18.8 (*)     All other components within normal limits   INR - Abnormal; Notable for the following:     INR 6.00 (*)     All other components  within normal limits   COMPREHENSIVE METABOLIC PANEL - Abnormal; Notable for the following:     Sodium 147 (*)     Chloride 116 (*)     Glucose 172 (*)     Urea Nitrogen 47 (*)     Creatinine 2.29 (*)     GFR Estimate 21 (*)     GFR Estimate If Black 25 (*)     Calcium 7.2 (*)     Albumin 2.5 (*)     Protein Total 5.6 (*)     Alkaline Phosphatase 216 (*)     All other components within normal limits   BLOOD GAS VENOUS - Abnormal; Notable for the following:     Ph Venous 7.26 (*)     PO2 Venous 20 (*)     All other components within normal limits   NT PROBNP INPATIENT - Abnormal; Notable for the following:     N-Terminal Pro BNP Inpatient 33530 (*)     All other components within normal limits   LACTIC ACID WHOLE BLOOD   URINE MACROSCOPIC WITH REFLEX TO MICRO   CONTINUE INDWELLING URINARY CATHETER (MERA)   STRICT INTAKE AND OUTPUT     Patient an IV established and had blood work obtained.  EKG was obtained and reviewed as above.  Patient is given IV Bumex to see if that will help with diuresis.  Mera was placed for strict I&O.  Assessments & Plan (with Medical Decision Making)   Patient is a 77-year-old female presents with increasing shortness of breath.  Noted by her therapist today to be in respiratory distress.  She does admit that she's had increased weight gain of 20-25 pounds over the last 3-4 days.  Noted increased swelling in her lower extremities.  Patient does have a history of coronary artery disease and A. fib/flutter.  She was recently hospitalized for urosepsis and A. fib with RVR.  Had follow-up with cardiology and had future echocardiogram/BRANDEN and EP ablation study for future.  She did have previous echo in July 2016 for facility.  Most is an echo was February of this year at [facility].  At that point in EF was 63%.  I cannot give complete details regarding the remainder of the study.  Her primary doctor noted she had increased swelling on Friday his appointments so doubled her Lasix to 80 daily.   Despite that she's had increasing shortness of breath and swelling.  On exam the patient was afebrile.  Her pulse was 68 and regular.  Her blood pressure was in the 90s systolically.  With O2 her sats were 97%.  HEENT showed slight edema under her eyelids.  Injected right sclera.  She is able to speak in complete sentences.  She did have some JVD when lying at 30 .  Lungs reveal coarse breath sounds throughout all lung fields.  Cardiac seemed regular control.  Cannot appreciate murmur but loud sounds in the ER department in her loud respiratory sounds make that difficult.  Abdominal exam was nontender.  She had obesity and did have some pitting edema in the lower abdomen and pelvic region.  Her lower extremities from her buttock to her feet revealed tense pitting edema.  Clear discharge from the lower extremities but no cellulitis is apparent.  She has some erythema and excoriation under her left breast which is also mildly edematous.  When patient arrived she had an IV established and blood was obtained.  Her blood work showed elevation or proBNP at almost 17,000, renal insufficiency which is slightly worse than previous values, stable hemoglobin at 10.  Venous blood gas did show a pH of 7.26 with a PCO2 of 20.  EKG showed A. fib at a controlled rate.  Nonspecific T-wave changes but no acute ST elevation.  With the previous EKG she was in A. fib flutter with rapid ventricular response.  Chest x-ray shows congestive pattern with questionable airspace disease in the right base.  Patient trace bilateral pleural effusions.  Patient's was provided with O2 therapy with improvement in her symptoms.  She was given IV Bumex for additional diuresis.  Cee was placed so we can monitor strict I's and O's.  Dr. Wong from the hospitalist services apprised and will follow on admission.  I have reviewed the nursing notes.    I have reviewed the findings, diagnosis, plan and need for follow up with the patient.    Current  Discharge Medication List          Final diagnoses:   Dyspnea, unspecified type   Acute on chronic congestive heart failure, unspecified congestive heart failure type (H)   Generalized edema       5/31/2017   [facility] EMERGENCY DEPARTMENT     Jose Bee MD  05/31/17 1723

## 2017-06-01 ENCOUNTER — APPOINTMENT (OUTPATIENT)
Dept: ULTRASOUND IMAGING | Facility: CLINIC | Age: 77
DRG: 683 | End: 2017-06-01
Attending: INTERNAL MEDICINE
Payer: MEDICARE

## 2017-06-01 ENCOUNTER — APPOINTMENT (OUTPATIENT)
Dept: CARDIOLOGY | Facility: CLINIC | Age: 77
DRG: 683 | End: 2017-06-01
Attending: INTERNAL MEDICINE
Payer: MEDICARE

## 2017-06-01 DIAGNOSIS — G47.33 OSA ON CPAP: Primary | ICD-10-CM

## 2017-06-01 PROBLEM — R60.1 GENERALIZED EDEMA: Status: ACTIVE | Noted: 2017-06-01

## 2017-06-01 PROBLEM — R31.0 GROSS HEMATURIA: Status: ACTIVE | Noted: 2017-06-01

## 2017-06-01 PROBLEM — I50.9 ACUTE ON CHRONIC HEART FAILURE (H): Status: ACTIVE | Noted: 2017-05-31

## 2017-06-01 PROBLEM — R79.1 SUPRATHERAPEUTIC INR: Status: ACTIVE | Noted: 2017-06-01

## 2017-06-01 PROBLEM — I13.0 HYPERTENSIVE HEART AND CHRONIC KIDNEY DISEASE WITH HEART FAILURE AND STAGE 1 THROUGH STAGE 4 CHRONIC KIDNEY DISEASE, OR UNSPECIFIED CHRONIC KIDNEY DISEASE (H): Status: ACTIVE | Noted: 2017-06-01

## 2017-06-01 LAB
ALBUMIN UR-MCNC: 100 MG/DL
ANION GAP SERPL CALCULATED.3IONS-SCNC: 10 MMOL/L (ref 3–14)
APPEARANCE UR: ABNORMAL
BACTERIA #/AREA URNS HPF: ABNORMAL /HPF
BASE DEFICIT BLDA-SCNC: 7.1 MMOL/L
BILIRUB UR QL STRIP: NEGATIVE
BUN SERPL-MCNC: 48 MG/DL (ref 7–30)
CALCIUM SERPL-MCNC: 7.2 MG/DL (ref 8.5–10.1)
CHLORIDE SERPL-SCNC: 117 MMOL/L (ref 94–109)
CO2 SERPL-SCNC: 22 MMOL/L (ref 20–32)
COLOR UR AUTO: ABNORMAL
CREAT SERPL-MCNC: 2.42 MG/DL (ref 0.52–1.04)
CREAT SERPL-MCNC: 2.42 MG/DL (ref 0.52–1.04)
CREAT UR-MCNC: 25 MG/DL
CREAT UR-MCNC: 36 MG/DL
ERYTHROCYTE [DISTWIDTH] IN BLOOD BY AUTOMATED COUNT: 18.6 % (ref 10–15)
FRACT EXCRET NA UR+SERPL-RTO: 5.7 %
GFR SERPL CREATININE-BSD FRML MDRD: 19 ML/MIN/1.7M2
GLUCOSE BLDC GLUCOMTR-MCNC: 115 MG/DL (ref 70–99)
GLUCOSE BLDC GLUCOMTR-MCNC: 143 MG/DL (ref 70–99)
GLUCOSE BLDC GLUCOMTR-MCNC: 171 MG/DL (ref 70–99)
GLUCOSE BLDC GLUCOMTR-MCNC: 177 MG/DL (ref 70–99)
GLUCOSE BLDC GLUCOMTR-MCNC: 209 MG/DL (ref 70–99)
GLUCOSE SERPL-MCNC: 118 MG/DL (ref 70–99)
GLUCOSE UR STRIP-MCNC: NEGATIVE MG/DL
HCO3 BLD-SCNC: 18 MMOL/L (ref 21–28)
HCT VFR BLD AUTO: 32.2 % (ref 35–47)
HGB BLD-MCNC: 9.2 G/DL (ref 11.7–15.7)
HGB UR QL STRIP: ABNORMAL
INR PPP: 6.24 (ref 0.86–1.14)
KETONES UR STRIP-MCNC: NEGATIVE MG/DL
LEUKOCYTE ESTERASE UR QL STRIP: ABNORMAL
MCH RBC QN AUTO: 23.4 PG (ref 26.5–33)
MCHC RBC AUTO-ENTMCNC: 28.6 G/DL (ref 31.5–36.5)
MCV RBC AUTO: 82 FL (ref 78–100)
NITRATE UR QL: NEGATIVE
O2/TOTAL GAS SETTING VFR VENT: 21 %
PCO2 BLD: 35 MM HG (ref 35–45)
PH BLD: 7.33 PH (ref 7.35–7.45)
PH UR STRIP: 5 PH (ref 5–7)
PLATELET # BLD AUTO: 280 10E9/L (ref 150–450)
PO2 BLD: 76 MM HG (ref 80–105)
POTASSIUM SERPL-SCNC: 4.1 MMOL/L (ref 3.4–5.3)
PROT UR-MCNC: 0.75 G/L
PROT/CREAT 24H UR: 2.11 G/G CR (ref 0–0.2)
RBC # BLD AUTO: 3.94 10E12/L (ref 3.8–5.2)
RBC #/AREA URNS AUTO: >182 /HPF (ref 0–2)
SODIUM SERPL-SCNC: 149 MMOL/L (ref 133–144)
SODIUM SERPL-SCNC: 149 MMOL/L (ref 133–144)
SODIUM UR-SCNC: 88 MMOL/L
SP GR UR STRIP: 1.01 (ref 1–1.03)
URN SPEC COLLECT METH UR: ABNORMAL
UROBILINOGEN UR STRIP-MCNC: 0 MG/DL (ref 0–2)
WBC # BLD AUTO: 8 10E9/L (ref 4–11)
WBC #/AREA URNS AUTO: >182 /HPF (ref 0–2)

## 2017-06-01 PROCEDURE — 82803 BLOOD GASES ANY COMBINATION: CPT | Performed by: INTERNAL MEDICINE

## 2017-06-01 PROCEDURE — 25000128 H RX IP 250 OP 636: Performed by: INTERNAL MEDICINE

## 2017-06-01 PROCEDURE — 85610 PROTHROMBIN TIME: CPT | Performed by: INTERNAL MEDICINE

## 2017-06-01 PROCEDURE — 93306 TTE W/DOPPLER COMPLETE: CPT

## 2017-06-01 PROCEDURE — 80048 BASIC METABOLIC PNL TOTAL CA: CPT | Performed by: INTERNAL MEDICINE

## 2017-06-01 PROCEDURE — 76770 US EXAM ABDO BACK WALL COMP: CPT

## 2017-06-01 PROCEDURE — 25000131 ZZH RX MED GY IP 250 OP 636 PS 637: Mod: GY | Performed by: PEDIATRICS

## 2017-06-01 PROCEDURE — A9270 NON-COVERED ITEM OR SERVICE: HCPCS | Mod: GY | Performed by: INTERNAL MEDICINE

## 2017-06-01 PROCEDURE — 99233 SBSQ HOSP IP/OBS HIGH 50: CPT | Performed by: INTERNAL MEDICINE

## 2017-06-01 PROCEDURE — 87086 URINE CULTURE/COLONY COUNT: CPT | Performed by: PEDIATRICS

## 2017-06-01 PROCEDURE — 93306 TTE W/DOPPLER COMPLETE: CPT | Mod: 26 | Performed by: INTERNAL MEDICINE

## 2017-06-01 PROCEDURE — 25000132 ZZH RX MED GY IP 250 OP 250 PS 637: Mod: GY | Performed by: PEDIATRICS

## 2017-06-01 PROCEDURE — 85027 COMPLETE CBC AUTOMATED: CPT | Performed by: INTERNAL MEDICINE

## 2017-06-01 PROCEDURE — 84156 ASSAY OF PROTEIN URINE: CPT | Performed by: PEDIATRICS

## 2017-06-01 PROCEDURE — 84295 ASSAY OF SERUM SODIUM: CPT | Performed by: INTERNAL MEDICINE

## 2017-06-01 PROCEDURE — 81001 URINALYSIS AUTO W/SCOPE: CPT | Performed by: PEDIATRICS

## 2017-06-01 PROCEDURE — A9270 NON-COVERED ITEM OR SERVICE: HCPCS | Mod: GY | Performed by: PEDIATRICS

## 2017-06-01 PROCEDURE — 00000146 ZZHCL STATISTIC GLUCOSE BY METER IP

## 2017-06-01 PROCEDURE — 12000007 ZZH R&B INTERMEDIATE

## 2017-06-01 PROCEDURE — 25000132 ZZH RX MED GY IP 250 OP 250 PS 637: Mod: GY | Performed by: INTERNAL MEDICINE

## 2017-06-01 PROCEDURE — 25000125 ZZHC RX 250

## 2017-06-01 PROCEDURE — 36415 COLL VENOUS BLD VENIPUNCTURE: CPT | Performed by: INTERNAL MEDICINE

## 2017-06-01 RX ORDER — LIDOCAINE HYDROCHLORIDE 10 MG/ML
INJECTION, SOLUTION EPIDURAL; INFILTRATION; INTRACAUDAL; PERINEURAL
Status: COMPLETED
Start: 2017-06-01 | End: 2017-06-01

## 2017-06-01 RX ADMIN — METOPROLOL TARTRATE 75 MG: 50 TABLET, FILM COATED ORAL at 20:41

## 2017-06-01 RX ADMIN — METOPROLOL TARTRATE 75 MG: 50 TABLET, FILM COATED ORAL at 08:34

## 2017-06-01 RX ADMIN — DILTIAZEM HYDROCHLORIDE 240 MG: 240 CAPSULE, EXTENDED RELEASE ORAL at 08:34

## 2017-06-01 RX ADMIN — FUROSEMIDE 10 MG/HR: 10 INJECTION, SOLUTION INTRAVENOUS at 17:05

## 2017-06-01 RX ADMIN — INSULIN ASPART 1 UNITS: 100 INJECTION, SOLUTION INTRAVENOUS; SUBCUTANEOUS at 10:46

## 2017-06-01 RX ADMIN — GLIPIZIDE 10 MG: 5 TABLET, EXTENDED RELEASE ORAL at 08:33

## 2017-06-01 RX ADMIN — INSULIN ASPART 2 UNITS: 100 INJECTION, SOLUTION INTRAVENOUS; SUBCUTANEOUS at 17:21

## 2017-06-01 RX ADMIN — ISOSORBIDE MONONITRATE 30 MG: 30 TABLET, EXTENDED RELEASE ORAL at 08:34

## 2017-06-01 RX ADMIN — INSULIN ASPART 2 UNITS: 100 INJECTION, SOLUTION INTRAVENOUS; SUBCUTANEOUS at 03:38

## 2017-06-01 RX ADMIN — FERROUS SULFATE 325 MG: 325 TABLET, FILM COATED ORAL at 08:34

## 2017-06-01 RX ADMIN — LIDOCAINE HYDROCHLORIDE 0.1 ML: 10 INJECTION, SOLUTION EPIDURAL; INFILTRATION; INTRACAUDAL; PERINEURAL at 09:40

## 2017-06-01 RX ADMIN — PHYTONADIONE 2.5 MG: 5 TABLET ORAL at 15:00

## 2017-06-01 NOTE — PROGRESS NOTES
S-(situation): Patient arrives to room 267 via cart from ED.    B-(background): CHF  Pmhx: recent hip surgery, released from rehab to home, up 20# since last MD visit.    A-(assessment): Pt somnolent. Lungs very dim. Exp whz,some cx.  Wakes up to voice. Will fall asleep mid-sentence.  sats good on 2l/nc.  bP 90/50.  Dr. Wong aware of somnolence.  Has 2 fluid filled blisters on rt lower leg, has 4 blisters on left lower leg with 2 open and weeping.    R-(recommendations): Orders reviewed with pt. Will monitor patient per MD orders.     Inpatient nursing criteria listed below were met:    Health care directives status obtained and documented: Yes  Core Measures assessed (SSI): Yes  SCD's Documented: Yes  Vaccine assessment done and vaccines ordered if appropriate: Yes  Skin issues/needs documented:Yes  Isolation needs addressed, if appropriate: n/a Fall Prevention: Care plan updated, Education given and documented Yes  MRSA swab completed for patient 55 years and older (exclude SADIA and TKA): Yes  My Chart patient sign up addressed and documented: No  Care Plan initiated: Yes  Education Assessment documented:Yes  Education Documented (Pre-existing chronic infection such as, MRSA/VRE need education on admission): Yes  New medication patient education completed and documented (Possible Side Effects of Common Medications handout): Yes  Home medications if not able to send immediately home with family stored here: NA   Reminder note placed in discharge instructions: NA  Discharge planning review completed (admission navigator) Yes

## 2017-06-01 NOTE — PROGRESS NOTES
Patient seen regarding CPAP equipment. It appears she has mold growing in the tubing. She has not gotten replacement equipment in a number of years. She does report regular use of cpap, but has not been seen by a sleep provider. I will write orders for replacement equipment. Previous study showed mild sleep apnea, co morbidities include diastolic heart failure, hypertension, pulmonary hypertension, and atrial flutter.

## 2017-06-01 NOTE — PLAN OF CARE
Problem: Goal Outcome Summary  Goal: Goal Outcome Summary  VSS, RA, denies pain or nausea, tolerating po.  Up to the BSC and had 2 large BM's today.  Continues on lasix gtt @ 5/hr, B LE's with weeping legs/blisters, chux under legs. Cee with 525 ml of bloody dark urine.  INR-6.24 today, MD aware, coumadin held, Vitamin K given.  ECHO today and Renal U/S today, see results.

## 2017-06-01 NOTE — PHARMACY-ANTICOAGULATION SERVICE
Clinical Pharmacy - Warfarin Dosing Consult     Pharmacy has been consulted to manage this patient s warfarin therapy.  Indication: Atrial Fibrillation  Therapy Goal: INR 2-3  Warfarin Prior to Admission: Yes  Warfarin PTA Regimen: 7.5 mg Fridays, 5 mg all other days of the week  Significant drug interactions: None  Recent documented change in oral intake/nutrition: Unknown  Dose Comments: Supratherapeutic INR - lower doses past week and warfarin held 5/30 and 5/31    INR   Date Value Ref Range Status   06/01/2017 6.24 (HH) 0.86 - 1.14 Final     Comment:     Critical Value called to and read back by  BLAIR SAMPSON RN, [facility] AT 0616 OA.     05/31/2017 6.00 (HH) 0.86 - 1.14 Final     Comment:     Critical Value called to and read back by  LUZMA MELENDEZ IN ED AT 1618 CR         Recommend holding warfarin dose today.  MD is aware of blood in the urine and based on INR, may order vitamin K at some point today. Pharmacy will monitor Kathryn WEEKS Janettgabriela daily and order warfarin doses to achieve specified goal.      Please contact pharmacy as soon as possible if the warfarin needs to be held for a procedure or if the warfarin goals change.

## 2017-06-01 NOTE — PLAN OF CARE
Problem: Goal Outcome Summary  Goal: Goal Outcome Summary  Outcome: No Change  Down 5 lbs.  VSS.  Up to bedside commode for a large BM.  Weak and slow, transferred with 2.  Denies pain and sob.  Needed 4l of oxygen to maintain 95% sats per order.  Has moderate generalized edema with oozing blisters on left lower leg. Has an extreme appetite and asked for food frequently.  INR 6.24 this am.  Will notify MD.  Urine is an orange color via moreira.  Lasix gtt cont. At 5mg/hr.

## 2017-06-01 NOTE — PROGRESS NOTES
SPIRITUAL HEALTH SERVICES  SPIRITUAL ASSESSMENT Progress Note  [facility]      Pt request - (Pt known to  from previous hospitalization.)  Pt stated she was glad to be improving and was looking forward to planting her flowers.   provided supportive listening and a prayer.   is available for pt/family needs.    Mauricio Hernandez M.Div., [facility]  Staff   Office tel: [phone]

## 2017-06-01 NOTE — PROGRESS NOTES
Hospitalist note    INR remains supratherapeutic at 6.24 today, and she has developed new gross hematuria today, but no other bleeding is apparent.  Hemoglobin has dropped from 10 to 9.  Initial UA at admission did not demonstrate hematuria or pyuria nor was proteinuria noted.  However, she was treated for Klebsiella UTI about a month ago.  Renal function remains impaired compared with baseline, and fractional excretion of sodium is elevated consistent with acute renal failure and acute tubular necrosis.  Renal ultrasound is generally reassuring.  Cause for acute renal failure remains indeterminate so far.  Urine output has been adequate so far and she has not been hyperkalemic, but she does have a worsening mild metabolic acidosis with normal anion gap that could be due to kidney failure.  Although no proteinuria was noted on initial dipstick UA, she is hypoalbuminemic for reasons that are unclear.  Additionally, precipitating trigger for her abrupt fluid weight gain recently remains unclear as no other causes for decompensated CHF are apparent, so acute renal failure with volume overload and/or nephrotic syndrome remains a consideration.  Echo demonstrates normal LV systolic function with LVH but no mention of diastolic dysfunction and no significant valvular disease.  Clinical history and test results are consistent with hypertensive heart disease with chronic kidney disease and heart failure which could also be contributing to her current volume overload.  So far, rate of atrial fibrillation has been well controlled.    Plan is to administer a dose of vitamin K today and check urinalysis, urine protein, and urine culture.  Continue IV Lasix infusion as long as she tolerates it hemodynamically.  Follow hemoglobin.    Arash Silva MD

## 2017-06-01 NOTE — PHARMACY-ANTICOAGULATION SERVICE
Clinical Pharmacy - Warfarin Dosing Consult     Pharmacy has been consulted to manage this patient s warfarin therapy.  Indication: Atrial Fibrillation  Therapy Goal: INR 2-3  Warfarin Prior to Admission: Yes  Warfarin PTA Regimen: 7.5 mg Fridays, 5 mg all other days of the week  Significant drug interactions: None  Recent documented change in oral intake/nutrition: Unknown  Dose Comments: Supratherapeutic INR - lower doses past week and warfarin held 5/30 and 5/31    INR   Date Value Ref Range Status   05/31/2017 6.00 (HH) 0.86 - 1.14 Final     Comment:     Critical Value called to and read back by  LUZMA MELENDEZ IN ED AT 1618 CR     05/30/2017 6.5  Final       Recommend holding warfarin this evening due to supratherapeutic INR.  Pharmacy will monitor Kathryn WEEKS Dallasdanial daily and order warfarin doses to achieve specified goal.      Please contact pharmacy as soon as possible if the warfarin needs to be held for a procedure or if the warfarin goals change.    Adalberto Niño, [facility]

## 2017-06-01 NOTE — PROGRESS NOTES
[facility]    Hospitalist Progress Note    Date of Service (when I saw the patient): 06/01/2017    Assessment & Plan   Kathryn Banks is a 77 year old female who was admitted on 5/31/2017.     Active Problems:    Acute kidney injury (H)    Assessment: morning lab still pending but making good urine and clinically looks and feels better this morning.    Plan: continue lasix drip and continue close monitoring of urine output.  Renal US to be completed today      Acute on chronic diastolic congestive heart failure (H)    Assessment: as above.  Less edema on exam.  Feels less SOB and not having rales in her lungs this am.    Plan: echo today and continue lasix drip      Type 2 diabetes mellitus with other circulatory complications (H)    Assessment: controlled    Plan: no change in glipizide or correction scale for now      Pulmonary hypertension (H)    Assessment: known and she could have cor pulmonale contributing to her edema as well    Plan: await echo results      Atrial flutter (H)    Assessment: rates remain controlled.  INR elevated on admission so warfarin being held    Plan: continue to monitor INR.  Ablation scheduled for this coming week will likely need to be delayed      Sleep apnea    Assessment: chronic    Plan: CPAP      Chronic kidney disease, stage III (moderate)    Assessment: as above    Plan: as above      CAD - NSTEMI, CABG x 5 2007, angio in 2013 with patent grafts other than occluded graft to PL    Assessment: asymptomatic    Plan: no acute interventions      Anemia    Assessment: mild drop since admission but no symptoms suggesting blood loss    Plan: follow      Non morbid obesity, unspecified obesity type    Assessment: chronic    Plan: no acute intervention      Essential hypertension with goal blood pressure less than 140/90    Assessment: BP running borderline low    Plan: will hold metoprolol and diltiazem if pressures drop below 90    DVT Prophylaxis:  Warfarin  Code Status: Full Code    Disposition: Expected discharge in 3-4 days once edema improved and MARTÍN resolved.    Mike Wong MD    Interval History   Feels less SOB.  Not having the pressure in her abdomen she was experiencing on admission.     -Data reviewed today: I reviewed all new labs and imaging results over the last 24 hours. I personally reviewed no images or EKG's today.    Physical Exam   Temp: 96.8  F (36  C) Temp src: Oral BP: 108/59 Pulse: 77 Heart Rate: 77 Resp: 16 SpO2: 95 % O2 Device: None (Room air) Oxygen Delivery: 5 LPM  Vitals:    05/31/17 1857 06/01/17 0543   Weight: 99.5 kg (219 lb 5.7 oz) 97.5 kg (214 lb 15.2 oz)     Vital Signs with Ranges  Temp:  [95.7  F (35.4  C)-96.8  F (36  C)] 96.8  F (36  C)  Pulse:  [65-77] 77  Heart Rate:  [58-77] 77  Resp:  [9-39] 16  BP: ()/(53-87) 108/59  SpO2:  [90 %-100 %] 95 %  I/O last 3 completed shifts:  In: -   Out: 900 [Urine:900]    Lungs:  CTA auscultation throughout        Cardiovascular:   RRR with no murmur, rub or gallop     Abdomen:   +BS.  Soft, NT     Less edema noted in her face and arms.  Legs still tight up to her waist but less weeping    Medications     - MEDICATION INSTRUCTIONS -       furosemide (LASIX) infusion ADULT 5 mg/hr (05/31/17 2142)     Warfarin Therapy Reminder         metoprolol  75 mg Oral BID     diltiazem  240 mg Oral Daily     ferrous sulfate  325 mg Oral Daily with breakfast     glipiZIDE  10 mg Oral Daily     isosorbide mononitrate  30 mg Oral Daily     insulin aspart  1-6 Units Subcutaneous Q4H       Data     Recent Labs  Lab 06/01/17  0553 06/01/17  0009 05/31/17  1541 05/30/17   WBC 8.0  --  8.6  --    HGB 9.2*  --  10.0*  --    MCV 82  --  82  --      --  286  --    INR  --   --  6.00* 6.5   NA  --  149* 147*  --    POTASSIUM  --   --  4.1  --    CHLORIDE  --   --  116*  --    CO2  --   --  23  --    BUN  --   --  47*  --    CR  --  2.42* 2.29*  --    ANIONGAP  --   --  8  --    MALICK  --    --  7.2*  --    GLC  --   --  172*  --    ALBUMIN  --   --  2.5*  --    PROTTOTAL  --   --  5.6*  --    BILITOTAL  --   --  0.3  --    ALKPHOS  --   --  216*  --    ALT  --   --  33  --    AST  --   --  21  --    TROPI  --   --  <0.015The 99th percentile for upper reference range is 0.045 ug/L.  Troponin values in the range of 0.045 - 0.120 ug/L may be associated with risks of adverse clinical events.  --        Recent Results (from the past 24 hour(s))   XR Chest 2 Views    Narrative    XR CHEST 2 VW 5/31/2017 4:27 PM    COMPARISON: 4/28/2017    HISTORY: Dyspnea      Impression    IMPRESSION: Cardiac silhouette within normal limits. Median sternotomy  wires appear intact. Mild pulmonary edema as well as focal airspace  opacity at the right base, possibly representing aspiration or  infection. Likely trace bilateral pleural effusions. No pneumothorax  on either side.    ANUP DURAND

## 2017-06-01 NOTE — H&P
[facility]    History and Physical  Hospitalist       Date of Admission:  5/31/2017  Date of Service (when I saw the patient): 05/31/17    Assessment & Plan       Active Problems:    Acute kidney injury (H)    Assessment: unclear if this is the primary problem or secondary to her worsening CHF.   She is markedly fluid overloaded and has gained 34 pounds in the past 16 days.  She has increased SOB associated with anasarca.  At increased risk of respiratory failure without aggressive intervention.    Plan: admit to inpatient, moreira catheter for strict I and O, check renal US and check FENA, IV lasix and follow basic profile.  May need to consider ultrafiltration if not responding to lasix drip      Acute on chronic diastolic congestive heart failure (H)    Assessment: No CP and no ischemic changes on her EKG.  Her fluid has worsened despite increasing doses of lasix so unclear if she will respond to IV lasix    Plan: lasix drip, check an echocardiogram, monitor on tele      Type 2 diabetes mellitus with other circulatory complications (H)    Assessment: chronic and has been well controlled per her report    Plan: ADA and 2 gram sodium diet, check sugars ac and hs, novolog correction scale as needed, continue glipizide for now      Pulmonary hypertension (H)    Assessment: chronic and could be contributing to her fluid overload as well    Plan: oxygen and check echocardiogram.  Continue oxygen and monitor oxygen sats.  Will also check an ABG      Atrial flutter (H)    Assessment: rates well controlled.  INR supratherapeutic.  Scheduled for ablation in one week    Plan: pharmacy to manage coumadin and continue diltiazem and metoprolol      Sleep apnea    Assessment: chronic    Plan: use home CPAP      Chronic kidney disease, stage III (moderate)    Assessment: as above    Plan: as above      CAD - NSTEMI, CABG x 5 2007, angio in 2013 with patent grafts other than occluded graft to PL     Assessment: no CP or ischemic changes on her EKG    Plan: check troponin      Anemia    Assessment: chronic and stable    Plan: follow      Non morbid obesity, unspecified obesity type    Assessment: chronic    Plan: no acute intervention      Essential hypertension with goal blood pressure less than 140/90    Assessment: controlled    Plan: no change    DVT Prophylaxis: Warfarin  Code Status: Full Code    Disposition: Expected discharge in 4-5 days once acute CHF resolved.    Mike Wong MD    Primary Care Physician   Dheeraj Jj    Chief Complaint   Swelling    History is obtained from the patient    History of Present Illness   Kathryn Banks is a 77 year old female who presents with swelling and SOB.  She was feeling well about two weeks ago.  Starting about 10 days ago she started to get swelling in her legs and since that time has progressed up her legs and now involves her trunk, arms and face.  She saw her PMD about one week ago and her lasix was increased.  Despite that her swelling has continued to worsen and now in the last two days she has started getting SOB.  She has not had cough, fever or CP.  She presented to the ED and is now admitted.     Past Medical History    I have reviewed this patient's medical history and updated it with pertinent information if needed.   Past Medical History:   Diagnosis Date     Carpal tunnel syndrome      Coronary atherosclerosis of native coronary artery 2006    5 vessel CABG     OBSTRUCTIVE SLEEP APNEA     using CPAP     Osteoarthrosis, unspecified whether generalized or localized, unspecified site     generalized arthritis, particularly in her hands and feet         Other and combined forms of senile cataract 2000    Bilateral     Other and unspecified hyperlipidemia      RESTLESS LEGS SYNDROME      TENOSYNOV HAND/WRIST NEC 6/30/2006     Type II or unspecified type diabetes mellitus without mention of complication, not stated as uncontrolled 2001     Diabetes mellitus/pt is diet controlled with weight loss     Typical atrial flutter (H) 01/17/2007     Unspecified essential hypertension        Past Surgical History   I have reviewed this patient's surgical history and updated it with pertinent information if needed.  Past Surgical History:   Procedure Laterality Date     ANGIOPLASTY       C APPENDECTOMY       C CABG, VEIN, FIVE  12/06    SVG x 4 and LIMA to LAD     C SOTO W/O FACETEC FORAMOT/DSKC 1/2 VRT SEG, LUMBAR  1968     C REMV CATARACT INTRACAP,INSERT LENS      Bilateral     C TOTAL ABDOM HYSTERECTOMY  1995     - fibroids     C VAGOTOMY/PYLOROPLASTY,SELECT  1970     COLONOSCOPY  12/3/2007     COLONOSCOPY  1/17/2014    Procedure: COLONOSCOPY;  Colonoscopy;  Surgeon: Zack Stearns MD;  Location:  GI     CYSTOSCOPY  11/25/09    [facility]     ENDOSCOPY  03/21/2000    Upper GI     HC COLONOSCOPY THRU STOMA, DIAGNOSTIC  10/7/04    poor prep, repeat in 2-3 years     HC COLONOSCOPY W/WO BRUSH/WASH  10/31/07    Repeat-poor quality prep     HC REMOVAL OF OVARY/TUBE(S)      Salpingo-Oophorectomy, Unilateral     HC REVISE MEDIAN N/CARPAL TUNNEL SURG      Carpal tunnel release     HC UGI ENDOSCOPY DIAG W BIOPSY  10/31/07     HC UGI ENDOSCOPY, SIMPLE EXAM  12/3/2007     OPEN REDUCTION INTERNAL FIXATION HIP NAILING Left 7/3/2016    Procedure: OPEN REDUCTION INTERNAL FIXATION HIP NAILING;  Surgeon: Lake Schulz MD;  Location:  OR       Prior to Admission Medications   Prior to Admission Medications   Prescriptions Last Dose Informant Patient Reported? Taking?   GABAPENTIN PO 5/31/2017 at 8am  Yes Yes   Sig: Take 300 mg by mouth 2 times daily    GLIPIZIDE XL PO 5/31/2017 at 8am  Yes Yes   Sig: Take 10 mg by mouth daily   ORDER FOR DME   No No   Sig: Equipment being ordered: cpap machine, mask, humidifier, tubing and filters   PRAVASTATIN SODIUM PO 5/31/2017 at 8am  Yes Yes   Sig: Take 40 mg by mouth daily   WARFARIN SODIUM PO Past Week at 05/29/17 at 6pm  Yes  Yes   Sig: Give 1mg by mouth Monday and Tuesday. 2.5mg by mouth on Wednesday Recheck INR on    calcium carbonate-vitamin D 500-400 MG-UNIT TABS tablt 2017 at 8am  No Yes   Sig: Take 1 tablet by mouth 3 times daily   Patient taking differently: Take 1 tablet by mouth once    cephALEXin (KEFLEX) 500 MG capsule 2017 at noon  No Yes   Sig: Take 1 capsule (500 mg) by mouth 3 times daily for 14 days   cyanocobalamin (VITAMIN B12) 1000 MCG/ML injection Past Month at Unknown time  No Yes   Sig: Inject 1 mL (1,000 mcg) into the muscle every 30 days   diltiazem (CARDIZEM CD) 120 MG 24 hr capsule 2017 at 8am  No Yes   Sig: Take 2 capsules (240 mg) by mouth daily   ferrous sulfate (IRON) 325 (65 FE) MG tablet 2017 at 8am  No Yes   Sig: Take 1 tablet (325 mg) by mouth daily (with breakfast)   furosemide (LASIX) 40 MG tablet 2017 at 8am  No Yes   Si mg a day   Patient taking differently: 80 mg daily =40 mg a day   isosorbide mononitrate (IMDUR) 30 MG 24 hr tablet 2017 at 8am  No Yes   Sig: Take 3 tablets (90 mg) by mouth daily   Patient taking differently: Take 30 mg by mouth daily    metoprolol (LOPRESSOR) 50 MG tablet 2017 at 8am  No Yes   Sig: Take 1.5 tablets (75 mg) by mouth 2 times daily   nitroglycerin (NITROSTAT) 0.4 MG SL tablet More than a month at Unknown time  No No   Sig: Place 1 tablet under the tongue See Admin Instructions. for chest pain   pramipexole (MIRAPEX) 1 MG tablet 2017 at 9pm  No Yes   Sig: TAKE 3 TABLETS BY MOUTH EVERY EVENING      Facility-Administered Medications: None     Allergies   Allergies   Allergen Reactions     Ciprofloxacin Nausea and Vomiting     Zofran did not help     Oxycodone Visual Disturbance     Delusions, blackouts      Lisinopril Cough     Penicillins Rash     Sulfa Drugs GI Disturbance     LOSS OF TASTE       Social History   I have reviewed this patient's social history and updated it with pertinent information if needed.  Kathryn Banks  reports that she quit smoking about 48 years ago. She quit after 10.00 years of use. She has never used smokeless tobacco. She reports that she drinks alcohol. She reports that she does not use illicit drugs.    Family History   I have reviewed this patient's family history and updated it with pertinent information if needed.   Family History   Problem Relation Age of Onset     DIABETES Father      AODM     Neurologic Disorder Father      Parkinson's     Blood Disease Father       from blood clot from leg to lung immediately after hip fracture     DIABETES Paternal Grandmother      adult onset     DIABETES Maternal Grandmother      adult onset     Hypertension No family hx of      CEREBROVASCULAR DISEASE No family hx of        Review of Systems   The 10 point Review of Systems is negative other than noted in the HPI or here.     Physical Exam   Temp: 95.7  F (35.4  C) Temp src: Oral BP: 96/53 Pulse: 72 Heart Rate: 72 Resp: 20 SpO2: 96 % O2 Device: Nasal cannula Oxygen Delivery: 2 LPM  Vital Signs with Ranges  Temp:  [95.7  F (35.4  C)-96.7  F (35.9  C)] 95.7  F (35.4  C)  Pulse:  [68-72] 72  Heart Rate:  [58-72] 72  Resp:  [9-39] 20  BP: ()/(53-87) 96/53  SpO2:  [94 %-100 %] 96 %  219 lbs 5.72 oz    Constitutional:   awake, alert, cooperative, no apparent distress, and appears stated age     Eyes:   Lids and lashes normal, pupils equal, round and reactive to light, extra ocular muscles intact, sclera clear, conjunctiva normal     ENT:   Normocephalic, without obvious abnormality, atraumatic, sinuses nontender on palpation, external ears without lesions, oral pharynx with moist mucous membranes, tonsils without erythema or exudates, gums normal and good dentition.     Neck:   Supple, symmetrical, trachea midline, no adenopathy, thyroid symmetric, not enlarged and no tenderness, skin normal     Hematologic / Lymphatic:   no cervical lymphadenopathy and no supraclavicular lymphadenopathy      Back:   Symmetric, no curvature, spinous processes are non-tender on palpation, paraspinous muscles are non-tender on palpation, no costal vertebral tenderness     Lungs:   Mild increased work of breathing.  Rales in both bases.  No wheezing or rhonchi     Cardiovascular:   Normal apical impulse, regular rate and rhythm, normal S1 and S2, no S3 or S4, and no murmur noted     Abdomen:   normal bowel sounds, soft, non-distended, non-tender, no masses palpated, no hepatosplenomegally     Neurologic:   Awake, alert, oriented to name, place and time.  Cranial nerves II-XII are grossly intact.  Motor is 5 out of 5 bilaterally.    Sensory is intact.      Skin:   Diffuse and tight edema from her toes all the way up to her hips.  Fluid also evident in her torso, arms and face.       Data   Data reviewed today:  I personally reviewed the EKG tracing showing A flutter without ischemic changes.    Recent Labs  Lab 05/31/17  1541 05/30/17 05/25/17   WBC 8.6  --   --    HGB 10.0*  --   --    MCV 82  --   --      --   --    INR 6.00* 6.5 2.2   *  --   --    POTASSIUM 4.1  --   --    CHLORIDE 116*  --   --    CO2 23  --   --    BUN 47*  --   --    CR 2.29*  --   --    ANIONGAP 8  --   --    MALICK 7.2*  --   --    *  --   --    ALBUMIN 2.5*  --   --    PROTTOTAL 5.6*  --   --    BILITOTAL 0.3  --   --    ALKPHOS 216*  --   --    ALT 33  --   --    AST 21  --   --        Recent Results (from the past 24 hour(s))   XR Chest 2 Views    Narrative    XR CHEST 2 VW 5/31/2017 4:27 PM    COMPARISON: 4/28/2017    HISTORY: Dyspnea      Impression    IMPRESSION: Cardiac silhouette within normal limits. Median sternotomy  wires appear intact. Mild pulmonary edema as well as focal airspace  opacity at the right base, possibly representing aspiration or  infection. Likely trace bilateral pleural effusions. No pneumothorax  on either side.    ANUP DURAND

## 2017-06-01 NOTE — CONSULTS
Consult for pharmacy to dose warfarin has been acknowledged.  Please see previous note for dosing details.    Adalberto Niño, HazelD

## 2017-06-01 NOTE — CONSULTS
Care Transition Initial Assessment -    Reason For Consult: discharge planning  Met with: Patient    Active Problems:    Sleep apnea    Chronic kidney disease, stage III (moderate)    CAD - NSTEMI, CABG x 5 2007, angio in 2013 with patent grafts other than occluded graft to PL    Anemia    Non morbid obesity, unspecified obesity type    Type 2 diabetes mellitus with other circulatory complications (H)    Acute kidney injury (H)    Essential hypertension with goal blood pressure less than 140/90    Pulmonary hypertension (H)    Acute on chronic diastolic congestive heart failure (H)    Atrial flutter (H)         DATA  Lives With: spouse  Living Arrangements: house     Who is your support system?: , Children  Identified issues/concerns regarding health management: None         Quality Of Family Relationships: involved  Transportation Available: family or friend will provide      ASSESSMENT  Cognitive Status:  awake, alert and oriented  Concerns to be addressed: Patient comes in from home with her .  She had a recent TCU stay at [facility] [place] (Admissions: [phone] Main Phone: [phone] Fax: [phone]).  Patient has been receiving [facility] since her TCU stay.  Patient would like to resume [facility] at discharge.     PLAN  Financial costs for the patient includes None   Patient given options and choices for discharge Yes   Patient/family is agreeable to the plan?  Yes  Patient Goals and Preferences: Return Home   Patient anticipates discharging to:  Home with [facility]   Discharge Planner   Discharge Plans in progress: Yes  Barriers to discharge plan: None  Follow up plan: Will follow up with patient during daily rounds to confirm d/c plan.    BON Simms  [facility] [phone]/ [facility] [phone]         Entered by: Sofie Ceja 06/01/2017 1:18 PM

## 2017-06-02 ENCOUNTER — CARE COORDINATION (OUTPATIENT)
Dept: CARE COORDINATION | Facility: CLINIC | Age: 77
End: 2017-06-02

## 2017-06-02 PROBLEM — R80.9 PROTEINURIA: Status: ACTIVE | Noted: 2017-06-02

## 2017-06-02 PROBLEM — R31.0 GROSS HEMATURIA: Status: RESOLVED | Noted: 2017-06-01 | Resolved: 2017-06-02

## 2017-06-02 PROBLEM — R00.1 BRADYCARDIA: Status: ACTIVE | Noted: 2017-06-02

## 2017-06-02 LAB
ANION GAP SERPL CALCULATED.3IONS-SCNC: 10 MMOL/L (ref 3–14)
BACTERIA SPEC CULT: NORMAL
BUN SERPL-MCNC: 57 MG/DL (ref 7–30)
CALCIUM SERPL-MCNC: 7.2 MG/DL (ref 8.5–10.1)
CHLORIDE SERPL-SCNC: 117 MMOL/L (ref 94–109)
CO2 SERPL-SCNC: 19 MMOL/L (ref 20–32)
CREAT SERPL-MCNC: 2.79 MG/DL (ref 0.52–1.04)
ERYTHROCYTE [DISTWIDTH] IN BLOOD BY AUTOMATED COUNT: 18.5 % (ref 10–15)
GFR SERPL CREATININE-BSD FRML MDRD: 17 ML/MIN/1.7M2
GLUCOSE BLDC GLUCOMTR-MCNC: 161 MG/DL (ref 70–99)
GLUCOSE BLDC GLUCOMTR-MCNC: 174 MG/DL (ref 70–99)
GLUCOSE BLDC GLUCOMTR-MCNC: 177 MG/DL (ref 70–99)
GLUCOSE BLDC GLUCOMTR-MCNC: 180 MG/DL (ref 70–99)
GLUCOSE BLDC GLUCOMTR-MCNC: 196 MG/DL (ref 70–99)
GLUCOSE SERPL-MCNC: 201 MG/DL (ref 70–99)
HCT VFR BLD AUTO: 32.8 % (ref 35–47)
HGB BLD-MCNC: 9.2 G/DL (ref 11.7–15.7)
INR PPP: 3.04 (ref 0.86–1.14)
MCH RBC QN AUTO: 22.8 PG (ref 26.5–33)
MCHC RBC AUTO-ENTMCNC: 28 G/DL (ref 31.5–36.5)
MCV RBC AUTO: 81 FL (ref 78–100)
MICRO REPORT STATUS: NORMAL
PLATELET # BLD AUTO: 288 10E9/L (ref 150–450)
POTASSIUM SERPL-SCNC: 4.5 MMOL/L (ref 3.4–5.3)
RBC # BLD AUTO: 4.04 10E12/L (ref 3.8–5.2)
SODIUM SERPL-SCNC: 146 MMOL/L (ref 133–144)
SPECIMEN SOURCE: NORMAL
WBC # BLD AUTO: 7.7 10E9/L (ref 4–11)

## 2017-06-02 PROCEDURE — 85610 PROTHROMBIN TIME: CPT | Performed by: INTERNAL MEDICINE

## 2017-06-02 PROCEDURE — 25000132 ZZH RX MED GY IP 250 OP 250 PS 637: Mod: GY | Performed by: INTERNAL MEDICINE

## 2017-06-02 PROCEDURE — 25000132 ZZH RX MED GY IP 250 OP 250 PS 637: Mod: GY | Performed by: PEDIATRICS

## 2017-06-02 PROCEDURE — A9270 NON-COVERED ITEM OR SERVICE: HCPCS | Mod: GY | Performed by: INTERNAL MEDICINE

## 2017-06-02 PROCEDURE — 25000128 H RX IP 250 OP 636: Performed by: PEDIATRICS

## 2017-06-02 PROCEDURE — 12000007 ZZH R&B INTERMEDIATE

## 2017-06-02 PROCEDURE — 80048 BASIC METABOLIC PNL TOTAL CA: CPT | Performed by: INTERNAL MEDICINE

## 2017-06-02 PROCEDURE — 36415 COLL VENOUS BLD VENIPUNCTURE: CPT | Performed by: INTERNAL MEDICINE

## 2017-06-02 PROCEDURE — 99233 SBSQ HOSP IP/OBS HIGH 50: CPT | Performed by: PEDIATRICS

## 2017-06-02 PROCEDURE — A9270 NON-COVERED ITEM OR SERVICE: HCPCS | Mod: GY

## 2017-06-02 PROCEDURE — 00000146 ZZHCL STATISTIC GLUCOSE BY METER IP

## 2017-06-02 PROCEDURE — A9270 NON-COVERED ITEM OR SERVICE: HCPCS | Mod: GY | Performed by: PEDIATRICS

## 2017-06-02 PROCEDURE — 25000128 H RX IP 250 OP 636: Performed by: INTERNAL MEDICINE

## 2017-06-02 PROCEDURE — 85027 COMPLETE CBC AUTOMATED: CPT | Performed by: INTERNAL MEDICINE

## 2017-06-02 PROCEDURE — 25000132 ZZH RX MED GY IP 250 OP 250 PS 637: Mod: GY

## 2017-06-02 RX ORDER — WARFARIN SODIUM 2.5 MG/1
2.5 TABLET ORAL
Status: COMPLETED | OUTPATIENT
Start: 2017-06-02 | End: 2017-06-02

## 2017-06-02 RX ORDER — CEFTRIAXONE 1 G/1
1 INJECTION, POWDER, FOR SOLUTION INTRAMUSCULAR; INTRAVENOUS ONCE
Status: COMPLETED | OUTPATIENT
Start: 2017-06-02 | End: 2017-06-02

## 2017-06-02 RX ORDER — METOLAZONE 2.5 MG/1
10 TABLET ORAL ONCE
Status: COMPLETED | OUTPATIENT
Start: 2017-06-02 | End: 2017-06-02

## 2017-06-02 RX ADMIN — WARFARIN SODIUM 2.5 MG: 2.5 TABLET ORAL at 17:01

## 2017-06-02 RX ADMIN — INSULIN ASPART 2 UNITS: 100 INJECTION, SOLUTION INTRAVENOUS; SUBCUTANEOUS at 12:05

## 2017-06-02 RX ADMIN — METOLAZONE 10 MG: 2.5 TABLET ORAL at 11:09

## 2017-06-02 RX ADMIN — FERROUS SULFATE 325 MG: 325 TABLET, FILM COATED ORAL at 08:16

## 2017-06-02 RX ADMIN — INSULIN ASPART 3 UNITS: 100 INJECTION, SOLUTION INTRAVENOUS; SUBCUTANEOUS at 17:01

## 2017-06-02 RX ADMIN — FUROSEMIDE 10 MG/HR: 10 INJECTION, SOLUTION INTRAVENOUS at 14:04

## 2017-06-02 RX ADMIN — DILTIAZEM HYDROCHLORIDE 240 MG: 240 CAPSULE, EXTENDED RELEASE ORAL at 08:16

## 2017-06-02 RX ADMIN — ISOSORBIDE MONONITRATE 30 MG: 30 TABLET, EXTENDED RELEASE ORAL at 08:17

## 2017-06-02 RX ADMIN — METOPROLOL TARTRATE 75 MG: 50 TABLET, FILM COATED ORAL at 08:17

## 2017-06-02 RX ADMIN — GLIPIZIDE 10 MG: 5 TABLET, EXTENDED RELEASE ORAL at 08:16

## 2017-06-02 RX ADMIN — FUROSEMIDE 10 MG/HR: 10 INJECTION, SOLUTION INTRAVENOUS at 04:08

## 2017-06-02 RX ADMIN — CEFTRIAXONE SODIUM 1 G: 1 INJECTION, POWDER, FOR SOLUTION INTRAMUSCULAR; INTRAVENOUS at 11:45

## 2017-06-02 RX ADMIN — METOPROLOL TARTRATE 12.5 MG: 25 TABLET, FILM COATED ORAL at 20:06

## 2017-06-02 RX ADMIN — INSULIN ASPART 2 UNITS: 100 INJECTION, SOLUTION INTRAVENOUS; SUBCUTANEOUS at 08:17

## 2017-06-02 NOTE — PLAN OF CARE
Problem: Goal Outcome Summary  Goal: Goal Outcome Summary  Outcome: Therapy, progress toward functional goals as expected  S-(situation): shift note     B-(background): CHF     A-(assessment): Pt is A&O.  VSS.  Afebrile.  Legs weeping- Blisters on legs.  Serous fluid noted.  Denies discomfort at this time.  Up on Commode- large stool.  Generalized edema - both legs are taut,shiney and edematous.  Gets SOA when up and moving.       R-(recommendations): Will cont to monitor the above.

## 2017-06-02 NOTE — PROGRESS NOTES
Clinic Care Coordination Contact  Care Team Conversations    Spoke with inpatient SW Gregoria and discussed the home care RN's concern about going home. States pt has been missing medications on a regular bases and family is having difficulty managing her wraps on her legs.  RN has been called to come out daily to help pt with wraps.  Would highly recommend TCU placement however, pt usually refuses.  There is a concern for high readmission due to compliance of medications and ability for self and family to manage her leg wraps.  I did speak with HC and they do have lymphedema nurses however, there are very few and there is a 1-2 month wait to have her come to the home. Gregoria was notified of this and will discuss with provider.     Meenakshi Larsen RN,BSN  Clinical Care Coordinator    [facility] [phone]  [facility] [phone]

## 2017-06-02 NOTE — PLAN OF CARE
Problem: Cardiac: Heart Failure (Adult)  Goal: Signs and Symptoms of Listed Potential Problems Will be Absent or Manageable (Cardiac: Heart Failure)  Signs and symptoms of listed potential problems will be absent or manageable by discharge/transition of care (reference Cardiac: Heart Failure (Adult) CPG).   Outcome: Improving  Pt alert and oriented, VSS on room air.  SOB on exertion.   Pt deep breathing with encouragement.  Moderate generalized edema, skin is shiny/taut.  BLE blisters with serous drainage.  Assist of 2 staff to shower, linens, telemetry patches, InterDry changed.  Urine red/rust-colored draining via Cee catheter, UA/UC sent to lab.  Lasix gtt continuing at 10mg/hr.  Continuing to monitor and assess.

## 2017-06-02 NOTE — PROGRESS NOTES
Clinic Care Coordination Contact  Care Team Conversations    Home care nurse Karly called regarding pt and concerns about her going home.  States she is very non compliant and does is unable to manage at home. States spouse is not able to help manage her dressings and pt has been taking her medications incorrectly. She is concerned that if she should return home she will again end up back in the hospital.  States the daughter and  say they are going to get more support at home however, have not yet.  Karly would highly recommend TCU at discharge.     Plan;  RN CC left a message with inpatient SW to discuss concerns.     Meenakshi Larsen RN,BSN  Clinical Care Coordinator    [facility] [phone]  [facility] [phone]

## 2017-06-02 NOTE — PLAN OF CARE
Problem: Goal Outcome Summary  Goal: Goal Outcome Summary  Outcome: Improving  Patient has been on room air, vital signs stable. On a lasi drip at 10 mg/hr. Generalized 3+ edema. Cee in place, urine output picking up. Metolazone added on this morning. Up with assist of 1 to chair for meals.

## 2017-06-02 NOTE — PHARMACY-ANTICOAGULATION SERVICE
Clinical Pharmacy - Warfarin Dosing Consult     Pharmacy has been consulted to manage this patient s warfarin therapy.  Indication: Atrial Fibrillation  Therapy Goal: INR 2-3  Warfarin Prior to Admission: Yes  Warfarin PTA Regimen: 7.5 mg Fridays, 5 mg all other days of the week  Significant drug interactions: None  Recent documented change in oral intake/nutrition: Unknown  Dose Comments: Supratherapeutic INR - lower doses past week and warfarin held 5/30,  5/31 and  6/1    INR   Date Value Ref Range Status   06/02/2017 3.04 (H) 0.86 - 1.14 Final   06/01/2017 6.24 (HH) 0.86 - 1.14 Final     Comment:     Critical Value called to and read back by  BLAIR SAMPSON RN, [facility] AT 0616 OA.         Recommend warfarin 2.5 mg today.  Pharmacy will monitor Kathryn Banks daily and order warfarin doses to achieve specified goal.      Please contact pharmacy as soon as possible if the warfarin needs to be held for a procedure or if the warfarin goals change.      Sweta Pop, PharmD

## 2017-06-02 NOTE — PROGRESS NOTES
[facility]    Hospitalist Progress Note    Date of Service (when I saw the patient): 06/02/2017    Assessment & Plan   Kathryn Banks is a 77 year old female who was admitted on 5/31/2017 with worsening anasarca and an approximately 30-40 pound weight gain over the last 3 weeks after she had been hospitalized for sepsis due to Klebsiella UTI and rapid atrial fibrillation.  There is increasing suspicion today for acute renal failure as the primary cause for her worsening volume status.    Principal Problem:    Acute renal failure with tubular necrosis (H)    Assessment: Creatinine continues to worsen and is 2.79 today, had treatment 2.29 at admission, and 1.58 on May 24 as an outpatient, urinalysis yesterday demonstrated hematuria and proteinuria and fractional excretion of sodium was elevated consistent with acute tubular necrosis, renal ultrasound was reassuring and nondiagnostic, urine culture is pending without strong suspicion for UTI although it is difficult to exclude with certainty and she had a small amount of pyuria on UA yesterday, cause for acute renal failure remains indeterminate but there is increasing suspicion for renal hypoperfusion due to uncontrolled atrial fibrillation and atrial flutter and even possible cardiorenal syndrome although EF has been normal, urine output is less than expected or desired on continuous IV Lasix infusion and she has persistent signs of volume overload that may be worsening as weight continues to rise, also now demonstrating probable mild metabolic acidosis with normal anion gap due to acute renal failure and is moderately uremic but so far has had a stable potassium    Plan: Continue IV Lasix infusion and add a dose of oral metolazone today, continue Cee catheter to monitor urine output closely, follow renal function and acid base status closely, consider adding bicarbonate supplementation if metabolic acidosis worsens, give one dose  of ceftriaxone today empirically pending urine culture results and continue appropriate antibiotic therapy if there is suspicion for UTI once urine culture results are available, adjust doses of other medications due to worsening renal function, optimize treatment of her cardiac problems, discussed nephrology evaluation for possible initiation of renal replacement therapy if she fails to improve or continues to worsen but this would require hospital transfer as those services are not available at this facility, patient declines such transfer at this time but would be willing to reconsider if her status fails to improve or deteriorates    Active Problems:    Generalized edema - anasarca    Assessment: Probably due to acute renal failure, worsening despite current treatment    Plan: Adjust treatment as summarized above, may need renal replacement therapy if unresponsive to medical therapy      Anemia    Assessment: Stable and probably due to anemia of chronic kidney disease    Plan: No changes      Type 2 diabetes mellitus with other circulatory complications (H)    Assessment: Chronic, A1c 6.2 on March 31, mostly adequate but not fully optimal glycemic control so far in the hospital but will be at risk for hypoglycemia from glipizide if renal function continues to worsen    Plan: re-consider stopping glipizide if blood sugars trend downward or renal function continues to worsen, continue NovoLog correction scale      Metabolic acidosis, normal anion gap (NAG)    Assessment: Apparent on chemistry testing today, most likely due to lack of bicarbonate regeneration caused by acute renal failure because she does not have history of frequent diarrhea to suggest GI bicarbonate losses    Plan: Follow chemistries and blood gases      Pulmonary hypertension (H)    Assessment: Chronic and improved on echocardiogram this hospital stay as compared with previously so probably not a primary trigger for her worsening volume status     Plan: Treat volume overload as outlined above      Chronic heart failure (H) with preserved EF    Assessment: Normal EF without mention of diastolic dysfunction on echo and no significant valvular disease, signs of volume overload now are thought to be due more to acute renal failure rather than of cardiogenic origin although cardiorenal syndrome is difficult to exclude with certainty but is not typical in the context of normal LV systolic function    Plan: Continue attempted aggressive diuresis, continue beta blocker although will adjust dose, ACE inhibitor and/or ARB are contraindicated      Atrial flutter (H)    Assessment: Rate controlled so far during this hospital stay and trending downward today now with bradycardia on her present combination of diltiazem and metoprolol, had uncontrolled rate from atrial fibrillation over the past few weeks prior to this hospital stay for which metoprolol dose was increased and diltiazem newly added in the last 2 weeks with dose increased about a week ago and it is possible that acute renal failure was precipitated by renal hyperperfusion from previously uncontrolled rapid atrial fibrillation, amiodarone had been started during her last hospital stay 3-4 weeks ago with improved rate control by hospital discharge at that time but the patient appears to have discontinuing amiodarone of her own accord after she was discharged from the nursing home on May 16 after which time she has had progressively worsening control of heart rate, ablation procedure has been planned for next week    Plan: Titrate doses of metoprolol and diltiazem to optimize rate control, unclear today whether her current acute renal failure will delay cardiac ablation procedure that it been planned for next week although if acute renal failure fails to improve over the next few days then her ablation procedure may need to be postponed      Hypertensive heart and chronic kidney disease with heart failure and  stage 1 through stage 4 chronic kidney disease, or unspecified chronic kidney disease (H)    Assessment: Chronic hypertensive heart disease that appears stable and chronic kidney disease with superimposed acute renal failure, well-controlled blood pressure    Plan: No acute changes      Supratherapeutic INR    Assessment: Taking warfarin chronically for prophylaxis because of atrial fibrillation and atrial flutter, INR up to 6.2 during this hospital stay at which time she developed gross hematuria, was administered 2.5 mg oral vitamin K yesterday and warfarin has been held and INR is now trending downward and gross hematuria has resolved, suspect elevated INR was due to a combination of chronic warfarin combined with acute renal failure with proteinuria that can affect the levels of circulating clotting factors    Plan: Pharmacy managing warfarin dosing      Proteinuria 2.1 g/g Cr    Assessment: Initial UA did not demonstrate proteinuria but repeat UA and urine random protein demonstrated non-nephrotic range proteinuria, proteinuria is consistent with acute renal failure with acute tubular necrosis, ACE inhibitor or ARB are contraindicated    Plan: No changes      Bradycardia    Assessment: Worsening heart rate today as low as the upper 40s intermittently probably due to prolonged effectiveness of metoprolol in the context of worsening acute renal failure, also taking diltiazem although adjustment of diltiazem dosing for acute renal failure is not normally recommended    Plan: Continue telemetry for another 24 hours, reduce metoprolol dose from 75 mg daily to 12.5 mg twice daily and hold doses of metoprolol as needed for heart rate less than 60, continue present dose of diltiazem but may need to hold tomorrow morning's dose of diltiazem or reduce dose of diltiazem if bradycardia persists, reconsider more aggressive intervention including addition of a chronotropic agent and/or pacing if necessary if she is  deteriorating      Sleep apnea    Assessment: Chronic and stable    Plan: Plan to reorder DME for home CPap equipment including tubing and mask at discharge      Postsurgical aortocoronary bypass status    Assessment: Chronic and stable    Plan: No changes      Chronic kidney disease, stage III (moderate)    Assessment: Acute renal failure    Plan: As outlined above      CAD - NSTEMI, CABG x 5 2007, angio in 2013 with patent grafts other than occluded graft to PL    Assessment: Chronic and stable    Plan: No changes      Hx of non-ST elevation myocardial infarction (NSTEMI)    Assessment: Old history    Plan: No changes      Non morbid obesity, unspecified obesity type    Assessment: Chronic    Plan: No changes      Essential hypertension with goal blood pressure less than 140/90    Assessment: Chronic and stable    Plan: No changes      Gross hematuria    Assessment: Probably due to supratherapeutic INR with indwelling Cee catheter, now resolved    Plan: As above    DVT Prophylaxis: Warfarin  Code Status: Full Code    Disposition: Expected discharge in 3-5 days once renal function and volume status improve.    Arash Silva MD    Interval History   She is not feeling good today.  She is tired with general malaise.  She remains dyspneic, particularly with activity.  She denies cough.  She does not think swelling of her arms has improved.  Her hands remain swollen.  She denies any pain in her legs or feet.  She denies any chest pain or palpitations.  She denies dizziness.  She has not had fever.  Blood pressure has been stable.  Heart rate has fluctuated from the normal range to bradycardia although she has not been tachycardic.  Oxygenation remains normal.  Appetite is poor although she has not been vomiting.  Urine output has increased somewhat after her IV Lasix infusion doses increased yesterday, but she has not had large amounts of urine output.  She has had a few loose stools but denies watery bowel  movements.  She denies any abdominal pain.    Recent medical records were reviewed.  She had been hospitalized about a month ago for Klebsiella UTI at which time she developed rapid atrial fibrillation that improved after starting treatment with amiodarone.  She was discharged on oral amiodarone therapy to the nursing home.  Weight at the time of hospital discharge was 189 pounds.  Amiodarone was continued at the nursing home with weight 181 pounds at the time of nursing home discharge.  However, it does not appear as though the patient filled her prescription for amiodarone after she was discharged from the nursing home.  When seen by cardiology on May 18, she was tachycardic with underlying atrial fibrillation despite metoprolol 75 mg twice daily, so diltiazem extended release 120 mg once daily was added.  Weight on May 18 was recorded at 181 pounds.  On May 24 at clinic follow-up, she remained tachycardic with underlying atrial fibrillation, and her dose of diltiazem was increased to 240 mg once daily.  At that visit, weight was 205 pounds, so her Lasix dose was doubled from 40 to 80 mg daily.  She was seen in urgent care on May 28 with concern for cellulitis of the legs at which time Keflex was prescribed.    -Data reviewed today: I reviewed all new labs and imaging results over the last 24 hours. I personally reviewed telemetry strips which demonstrated mostly normal heart rate over the last 24 hours with occasional bradycardia with heart rates in the 50s yesterday.  However, this morning, heart rate is more consistently low and has dipped into the 40s.    Physical Exam   Temp: 96.1  F (35.6  C) Temp src: Oral BP: 122/63 Pulse: 73 Heart Rate: 73 Resp: 20 SpO2: 96 % O2 Device: None (Room air) Oxygen Delivery: 2 LPM  Vitals:    06/01/17 0543 06/02/17 0406 06/02/17 0407   Weight: 97.5 kg (214 lb 15.2 oz) 100.5 kg (221 lb 9.6 oz) 102.5 kg (226 lb)     Vital Signs with Ranges  Temp:  [95  F (35  C)-98  F (36.7  C)]  96.1  F (35.6  C)  Pulse:  [58-76] 73  Heart Rate:  [58-80] 73  Resp:  [20-24] 20  BP: (106-122)/(52-63) 122/63  SpO2:  [94 %-98 %] 96 %  I/O last 3 completed shifts:  In: 1900 [P.O.:1740; I.V.:160]  Out: 1575 [Urine:1575]    Constitutional: Chronically ill appearing without signs of acute distress lying in bed, appears obese  Respiratory: Normal respiratory effort, diminished breath sounds at the bases, clear lungs  Cardiovascular: Irregularly irregular rate and rhythm, weak but palpable radial pulse, normal capillary refill  GI: Soft abdomen, obese, nontender  Skin/Integumen: Pale color, no rashes apparent  Neuro: Alert and maintains wakefulness and attention, answers questions appropriately  Other: Pitting edema present in the lower legs bilaterally as well as in the upper extremities    Medications     - MEDICATION INSTRUCTIONS -       furosemide (LASIX) infusion ADULT 10 mg/hr (06/02/17 0408)     Warfarin Therapy Reminder         metoprolol  75 mg Oral BID     insulin aspart  1-10 Units Subcutaneous TID AC     insulin aspart  1-7 Units Subcutaneous At Bedtime     diltiazem  240 mg Oral Daily     ferrous sulfate  325 mg Oral Daily with breakfast     glipiZIDE  10 mg Oral Daily     isosorbide mononitrate  30 mg Oral Daily       Data   Data reviewed today:  I personally reviewed telemetry.    Recent Labs  Lab 06/02/17  0548 06/01/17  0553 06/01/17  0009 05/31/17  1541   WBC 7.7 8.0  --  8.6   HGB 9.2* 9.2*  --  10.0*   MCV 81 82  --  82    280  --  286   INR 3.04* 6.24*  --  6.00*   * 149* 149* 147*   POTASSIUM 4.5 4.1  --  4.1   CHLORIDE 117* 117*  --  116*   CO2 19* 22  --  23   BUN 57* 48*  --  47*   CR 2.79* 2.42* 2.42* 2.29*   ANIONGAP 10 10  --  8   MALICK 7.2* 7.2*  --  7.2*   * 118*  --  172*   ALBUMIN  --   --   --  2.5*   PROTTOTAL  --   --   --  5.6*   BILITOTAL  --   --   --  0.3   ALKPHOS  --   --   --  216*   ALT  --   --   --  33   AST  --   --   --  21   TROPI  --   --   --   <0.015The 99th percentile for upper reference range is 0.045 ug/L.  Troponin values in the range of 0.045 - 0.120 ug/L may be associated with risks of adverse clinical events.       Recent Results (from the past 24 hour(s))   US Renal Complete    Narrative    ULTRASOUND RETROPERITONEAL COMPLETE 6/1/2017 12:21 PM     HISTORY: Acute kidney injury.    COMPARISON: 4/28/2017.    FINDINGS: The bilateral renal parenchyma are normal in echogenicity  without evidence for shadowing stone or mass. There is a simple  appearing 2.5 cm cyst in the central left kidney. This cyst was poorly  visualized on the prior. No hydronephrosis. Right kidney measures 10.2  x 4.0 x 3.7 cm and the left measures 10.0 x 4.4 x 4.2 cm. Cortical  thickness is 1.3 cm on the right and 1.1 cm on the left. Bladder is  decompressed by a Cee catheter.      Impression    IMPRESSION: No hydronephrosis.    ANNY MONTILLA MD     Urine culture is pending    Urine protein 2.1 g/g creatinine  Urinalysis yesterday demonstrated small amount leukocyte esterase with blood and proteinuria  Blood sugars have ranged from 143-201 over the last 24 hours    Old records were reviewed for comparison.  On May 24, creatinine was 1.58.  On May 2, creatinine was 1.71.

## 2017-06-02 NOTE — PROGRESS NOTES
Pt states she has a burning sensation in her upper right arm.  Could not see anything on her arm-her arm is slightly swollen and tight feeling.  Loosened her sleeve of her gown.  Will cont to monitor.

## 2017-06-03 ENCOUNTER — APPOINTMENT (OUTPATIENT)
Dept: OCCUPATIONAL THERAPY | Facility: CLINIC | Age: 77
DRG: 683 | End: 2017-06-03
Payer: MEDICARE

## 2017-06-03 ENCOUNTER — APPOINTMENT (OUTPATIENT)
Dept: PHYSICAL THERAPY | Facility: CLINIC | Age: 77
DRG: 683 | End: 2017-06-03
Payer: MEDICARE

## 2017-06-03 LAB
ANION GAP SERPL CALCULATED.3IONS-SCNC: 10 MMOL/L (ref 3–14)
BACTERIA SPEC CULT: NO GROWTH
BASE DEFICIT BLDV-SCNC: 5.7 MMOL/L
BUN SERPL-MCNC: 65 MG/DL (ref 7–30)
CALCIUM SERPL-MCNC: 7.5 MG/DL (ref 8.5–10.1)
CHLORIDE SERPL-SCNC: 115 MMOL/L (ref 94–109)
CO2 SERPL-SCNC: 22 MMOL/L (ref 20–32)
CREAT SERPL-MCNC: 2.99 MG/DL (ref 0.52–1.04)
GFR SERPL CREATININE-BSD FRML MDRD: 15 ML/MIN/1.7M2
GLUCOSE BLDC GLUCOMTR-MCNC: 169 MG/DL (ref 70–99)
GLUCOSE BLDC GLUCOMTR-MCNC: 171 MG/DL (ref 70–99)
GLUCOSE BLDC GLUCOMTR-MCNC: 185 MG/DL (ref 70–99)
GLUCOSE BLDC GLUCOMTR-MCNC: 186 MG/DL (ref 70–99)
GLUCOSE BLDC GLUCOMTR-MCNC: 203 MG/DL (ref 70–99)
GLUCOSE SERPL-MCNC: 190 MG/DL (ref 70–99)
HCO3 BLDV-SCNC: 20 MMOL/L (ref 21–28)
INR PPP: 2.14 (ref 0.86–1.14)
MICRO REPORT STATUS: NORMAL
O2/TOTAL GAS SETTING VFR VENT: 21 %
PCO2 BLDV: 39 MM HG (ref 40–50)
PH BLDV: 7.32 PH (ref 7.32–7.43)
PO2 BLDV: 50 MM HG (ref 25–47)
POTASSIUM SERPL-SCNC: 4.5 MMOL/L (ref 3.4–5.3)
SODIUM SERPL-SCNC: 147 MMOL/L (ref 133–144)
SPECIMEN SOURCE: NORMAL

## 2017-06-03 PROCEDURE — 40000133 ZZH STATISTIC OT WARD VISIT: Performed by: OCCUPATIONAL THERAPIST

## 2017-06-03 PROCEDURE — 80048 BASIC METABOLIC PNL TOTAL CA: CPT | Performed by: INTERNAL MEDICINE

## 2017-06-03 PROCEDURE — 97162 PT EVAL MOD COMPLEX 30 MIN: CPT | Mod: GP | Performed by: PHYSICAL THERAPIST

## 2017-06-03 PROCEDURE — 97110 THERAPEUTIC EXERCISES: CPT | Mod: GP | Performed by: PHYSICAL THERAPIST

## 2017-06-03 PROCEDURE — 97167 OT EVAL HIGH COMPLEX 60 MIN: CPT | Mod: GO | Performed by: OCCUPATIONAL THERAPIST

## 2017-06-03 PROCEDURE — 40000193 ZZH STATISTIC PT WARD VISIT: Performed by: PHYSICAL THERAPIST

## 2017-06-03 PROCEDURE — 25000128 H RX IP 250 OP 636: Performed by: INTERNAL MEDICINE

## 2017-06-03 PROCEDURE — 25000132 ZZH RX MED GY IP 250 OP 250 PS 637: Mod: GY | Performed by: INTERNAL MEDICINE

## 2017-06-03 PROCEDURE — 97535 SELF CARE MNGMENT TRAINING: CPT | Mod: GO | Performed by: OCCUPATIONAL THERAPIST

## 2017-06-03 PROCEDURE — 25000132 ZZH RX MED GY IP 250 OP 250 PS 637: Mod: GY | Performed by: PEDIATRICS

## 2017-06-03 PROCEDURE — 12000007 ZZH R&B INTERMEDIATE

## 2017-06-03 PROCEDURE — 97530 THERAPEUTIC ACTIVITIES: CPT | Mod: GP | Performed by: PHYSICAL THERAPIST

## 2017-06-03 PROCEDURE — 36415 COLL VENOUS BLD VENIPUNCTURE: CPT | Performed by: INTERNAL MEDICINE

## 2017-06-03 PROCEDURE — 97110 THERAPEUTIC EXERCISES: CPT | Mod: GO | Performed by: OCCUPATIONAL THERAPIST

## 2017-06-03 PROCEDURE — P9047 ALBUMIN (HUMAN), 25%, 50ML: HCPCS | Performed by: INTERNAL MEDICINE

## 2017-06-03 PROCEDURE — A9270 NON-COVERED ITEM OR SERVICE: HCPCS | Mod: GY | Performed by: INTERNAL MEDICINE

## 2017-06-03 PROCEDURE — 00000146 ZZHCL STATISTIC GLUCOSE BY METER IP

## 2017-06-03 PROCEDURE — 82803 BLOOD GASES ANY COMBINATION: CPT | Performed by: PEDIATRICS

## 2017-06-03 PROCEDURE — 85610 PROTHROMBIN TIME: CPT | Performed by: INTERNAL MEDICINE

## 2017-06-03 PROCEDURE — 99233 SBSQ HOSP IP/OBS HIGH 50: CPT | Performed by: INTERNAL MEDICINE

## 2017-06-03 RX ORDER — ALBUMIN (HUMAN) 12.5 G/50ML
12.5 SOLUTION INTRAVENOUS EVERY 6 HOURS
Status: DISCONTINUED | OUTPATIENT
Start: 2017-06-03 | End: 2017-06-06

## 2017-06-03 RX ORDER — WARFARIN SODIUM 5 MG/1
5 TABLET ORAL
Status: COMPLETED | OUTPATIENT
Start: 2017-06-03 | End: 2017-06-03

## 2017-06-03 RX ADMIN — DILTIAZEM HYDROCHLORIDE 240 MG: 240 CAPSULE, EXTENDED RELEASE ORAL at 08:01

## 2017-06-03 RX ADMIN — FERROUS SULFATE 325 MG: 325 TABLET, FILM COATED ORAL at 07:51

## 2017-06-03 RX ADMIN — WARFARIN SODIUM 5 MG: 5 TABLET ORAL at 17:20

## 2017-06-03 RX ADMIN — ALBUMIN HUMAN 12.5 G: 0.25 SOLUTION INTRAVENOUS at 15:04

## 2017-06-03 RX ADMIN — ALBUMIN HUMAN 12.5 G: 0.25 SOLUTION INTRAVENOUS at 20:56

## 2017-06-03 RX ADMIN — INSULIN ASPART 3 UNITS: 100 INJECTION, SOLUTION INTRAVENOUS; SUBCUTANEOUS at 17:20

## 2017-06-03 RX ADMIN — METOPROLOL TARTRATE 12.5 MG: 25 TABLET, FILM COATED ORAL at 20:56

## 2017-06-03 RX ADMIN — ISOSORBIDE MONONITRATE 30 MG: 30 TABLET, EXTENDED RELEASE ORAL at 08:01

## 2017-06-03 RX ADMIN — GLIPIZIDE 10 MG: 5 TABLET, EXTENDED RELEASE ORAL at 08:01

## 2017-06-03 RX ADMIN — FUROSEMIDE 10 MG/HR: 10 INJECTION, SOLUTION INTRAVENOUS at 00:19

## 2017-06-03 RX ADMIN — INSULIN ASPART 2 UNITS: 100 INJECTION, SOLUTION INTRAVENOUS; SUBCUTANEOUS at 07:52

## 2017-06-03 RX ADMIN — METOPROLOL TARTRATE 12.5 MG: 25 TABLET, FILM COATED ORAL at 08:01

## 2017-06-03 RX ADMIN — FUROSEMIDE 10 MG/HR: 10 INJECTION, SOLUTION INTRAVENOUS at 12:05

## 2017-06-03 RX ADMIN — INSULIN ASPART 2 UNITS: 100 INJECTION, SOLUTION INTRAVENOUS; SUBCUTANEOUS at 12:06

## 2017-06-03 ASSESSMENT — ACTIVITIES OF DAILY LIVING (ADL): PREVIOUS_RESPONSIBILITIES: MEAL PREP;HOUSEKEEPING;LAUNDRY;DRIVING;MEDICATION MANAGEMENT

## 2017-06-03 NOTE — PROGRESS NOTES
06/03/17 1100   Quick Adds   Type of Visit Initial PT Evaluation   Living Environment   Lives With spouse   Living Arrangements house   Transportation Available car;family or friend will provide   Self-Care   Usual Activity Tolerance fair   Current Activity Tolerance poor   Regular Exercise yes   Activity/Exercise/Self-Care Comment pt was getting [facility] PT and OT   Functional Level Prior   Ambulation 1-->assistive equipment   Transferring 1-->assistive equipment   Cognition 0 - no cognition issues reported   Fall history within last six months yes   Number of times patient has fallen within last six months 1   Which of the above functional risks had a recent onset or change? none   Prior Functional Level Comment Weaker than her normal self per report and decreased endurance   General Information   Onset of Illness/Injury or Date of Surgery - Date 05/28/17  (admit date)   Referring Physician Dr Hernandez   Patient/Family Goals Statement go back home in a few days and resume [facility], does not want TCU   Pertinent History of Current Problem (include personal factors and/or comorbidities that impact the POC) 77 y.o. female admitted on 5/28/17 with CHF, she had 30-40# fluid wt gain. Was getting home PT and OT prior to admit. Pt with L hip fracture and ORIF in the past, fall history. Pt normally used a FWW prior to admit.    Precautions/Limitations fall precautions   Weight-Bearing Status - LUE weight-bearing as tolerated   Weight-Bearing Status - RUE weight-bearing as tolerated   Weight-Bearing Status - LLE weight-bearing as tolerated   Weight-Bearing Status - RLE weight-bearing as tolerated   General Observations pt lying in bed, LE's ace wrapped. Pt motivated to get home.   Cognitive Status Examination   Orientation orientation to person, place and time   Pain Assessment   Patient Currently in Pain (some left hip pain with activity)   Posture    Posture Forward head position   Range of Motion (ROM)   ROM Comment Reduced  "L hip ROM s/p L hip surgery in February and last fall. B UE ROM WFL. R LE WFL. B ankle DF tight   Strength   Strength Comments B ankle DF 5/5, hip flexion 4/5, L knee flex/extension 5/5, L 4+/5   Bed Mobility   Bed Mobility Comments Pt uses railing to assist, SBA   Transfer Skills   Transfer Comments CGA sit to stand, reports some light headedness   Gait   Gait Gait Analysis;Gait Skill   Gait Comments FWW ambulation, CGA   Gait Analysis   Gait Pattern Used swing-to gait   Gait Deviations Noted decreased barbara;increased time in double stance;decreased stride length   General Therapy Interventions   Planned Therapy Interventions progressive activity/exercise;strengthening;balance training;ROM;transfer training   Clinical Impression   Criteria for Skilled Therapeutic Intervention yes, treatment indicated   PT Diagnosis Reduced activity tolerance, strength and functional mobility, previous Hip surgeries, reduced balance   Influenced by the following impairments strength, mobility, balance, endurance   Functional limitations due to impairments transfers, standing endurance, ambulation, general mobility   Clinical Presentation Evolving/Changing   Clinical Presentation Rationale Medical Cormorbidities, level of function pre admission, fall history   Clinical Decision Making (Complexity) Moderate complexity   Therapy Frequency` daily   Predicted Duration of Therapy Intervention (days/wks) 3 days   Anticipated Discharge Disposition Home with Home Therapy  (pt does not want TCU but may be helpful)   Risk & Benefits of therapy have been explained Yes   Patient, Family & other staff in agreement with plan of care Yes   [facility] AM-PAC  \"6 Clicks\" V.2 Basic Mobility Inpatient Short Form   1. Turning from your back to your side while in a flat bed without using bedrails? 3 - A Little   2. Moving from lying on your back to sitting on the side of a flat bed without using bedrails? 3 - A Little   3. Moving to and from a " bed to a chair (including a wheelchair)? 3 - A Little   4. Standing up from a chair using your arms (e.g., wheelchair, or bedside chair)? 3 - A Little   5. To walk in hospital room? 3 - A Little   6. Climbing 3-5 steps with a railing? 2 - A Lot   Basic Mobility Raw Score (Score out of 24.Lower scores equate to lower levels of function) 17   Total Evaluation Time   Total Evaluation Time (Minutes) 20

## 2017-06-03 NOTE — PROGRESS NOTES
[facility]    Hospitalist Progress Note    Date of Service (when I saw the patient): 06/03/2017    Assessment & Plan   Kathryn Banks is a 77 year old female who was admitted on 5/31/2017 with worsening anasarca and an approximately 30-40 pound weight gain over the last 3 weeks after she had been hospitalized for sepsis due to Klebsiella UTI and rapid atrial fibrillation.  There is increasing suspicion today for acute renal failure as the primary cause for her worsening volume status.    Principal Problem:    Acute renal failure with tubular necrosis (H)    Assessment: Creatinine continues to worsen and is 2.99 today, had treatment 2.29 at admission, and 1.58 on May 24 as an outpatient, urinalysis demonstrated hematuria and proteinuria and fractional excretion of sodium was elevated consistent with acute tubular necrosis, renal ultrasound was reassuring and nondiagnostic, urine culture is pending without strong suspicion for UTI although it is difficult to exclude with certainty and she had a small amount of pyuria on UA yesterday. Cause for acute renal failure remains indeterminate but there is increasing suspicion for renal hypoperfusion due to uncontrolled atrial fibrillation and atrial flutter and even possible cardiorenal syndrome although EF has been normal, urine output is improved with increased in lasix drip at 10mg/hr and a dose of metolazone.    Plan: Continue IV Lasix infusion at 10mg/hr and monitor Cr.  If not enough UOP, then will trial another dose of oral metolazone with possibly adding albumin for oncotic pressure as her's is low from possible renal loss. Continue Cee catheter to monitor urine output closely. May need to consider nephrology evaluation for possible initiation of renal replacement therapy if she fails to improve on the above plan. This would require hospital transfer as those services are not available at this facility. Patient would like to  continue trying now since her UOP is much more than yesterday.    Active Problems:    Generalized edema - anasarca    Assessment: Probably due to acute renal failure, worsening despite current treatment    Plan: Adjust treatment as summarized above, may need renal replacement therapy if unresponsive to medical therapy      Anemia    Assessment: Stable and probably due to anemia of chronic kidney disease    Plan: No changes      Type 2 diabetes mellitus with other circulatory complications (H)    Assessment: Chronic, A1c 6.2 on March 31, mostly adequate but not fully optimal glycemic control so far in the hospital but will be at risk for hypoglycemia from glipizide if renal function continues to worsen    Plan: re-consider stopping glipizide if blood sugars trend downward or renal function continues to worsen, continue NovoLog correction scale      Metabolic acidosis, normal anion gap (NAG)    Assessment: was seen on 6/2 lab, but resolved on 6/3.    Plan: Follow chemistries in AM      Atrial flutter (H)    Assessment: Rate controlled so far during this hospital stay and trending downward with bradycardia on 6/2 on combination of diltiazem and metoprolol, had uncontrolled rate from atrial fibrillation over the past few weeks prior to this hospital stay for which metoprolol dose was increased and diltiazem newly added in the last 2 weeks with dose increased about a week ago and it is possible that acute renal failure was precipitated by renal hypoperfusion from previously uncontrolled rapid atrial fibrillation, amiodarone had been started during her last hospital stay 3-4 weeks ago with improved rate control by hospital discharge at that time but the patient appears to have discontinuing amiodarone of her own accord after she was discharged from the nursing home on May 16 after which time she has had progressively worsening control of heart rate, ablation procedure has been planned for next week    Plan: Titrate doses  of metoprolol and diltiazem to optimize rate control, unclear today whether her current acute renal failure will delay cardiac ablation procedure that it been planned for next week although if acute renal failure fails to improve over the next few days then her ablation procedure may need to be postponed      Pulmonary hypertension (H)    Assessment: Chronic and improved on echocardiogram this hospital stay as compared with previously so probably not a primary trigger for her worsening volume status    Plan: Treat volume overload as outlined above      Chronic heart failure (H) with preserved EF    Assessment: Normal EF without mention of diastolic dysfunction on echo and no significant valvular disease, signs of volume overload now are thought to be due more to acute renal failure rather than of cardiogenic origin although cardiorenal syndrome is difficult to exclude with certainty but is not typical in the context of normal LV systolic function    Plan: Continue attempted aggressive diuresis, continue beta blocker at lower dose, ACE inhibitor and/or ARB are contraindicated      Hypertensive heart and chronic kidney disease with heart failure and stage 1 through stage 4 chronic kidney disease, or unspecified chronic kidney disease (H)    Assessment: Chronic hypertensive heart disease that appears stable and chronic kidney disease with superimposed acute renal failure, well-controlled blood pressure    Plan: Place hold parameters for low BPs      Supratherapeutic INR    Assessment: Taking warfarin chronically for prophylaxis because of atrial fibrillation and atrial flutter, INR up to 6.2 during this hospital stay at which time she developed gross hematuria, was administered 2.5 mg oral vitamin K and warfarin was held and INR is now trending downward and gross hematuria has resolved, suspect elevated INR was due to a combination of chronic warfarin combined with acute renal failure with proteinuria that can affect  the levels of circulating clotting factors    Plan: Pharmacy managing warfarin dosing      Proteinuria 2.1 g/g Cr    Assessment: Initial UA did not demonstrate proteinuria but repeat UA and urine random protein demonstrated non-nephrotic range proteinuria, proteinuria is consistent with acute renal failure with acute tubular necrosis, ACE inhibitor or ARB are contraindicated    Plan: No changes      Bradycardia    Assessment: As low as the upper 40s intermittently probably due to prolonged effectiveness of metoprolol in the context of worsening acute renal failure, also taking diltiazem although adjustment of diltiazem dosing for acute renal failure is not normally recommended    Plan: Continue telemetry for another 24 hours, cont reduced metoprolol doseof 12.5 mg twice daily and has hold parametr for HR< 60, continue present dose of diltiazem but may need to hold diltiazem or reduce dose of diltiazem if bradycardia persists, reconsider more aggressive intervention including addition of a chronotropic agent and/or pacing if necessary if she is deteriorating      Sleep apnea    Assessment: Chronic and stable    Plan: Plan to reorder DME for home CPap equipment including tubing and mask at discharge      Postsurgical aortocoronary bypass status    Assessment: Chronic and stable    Plan: No changes      Chronic kidney disease, stage III (moderate)    Assessment: Acute renal failure    Plan: As outlined above      CAD - NSTEMI, CABG x 5 2007, angio in 2013 with patent grafts other than occluded graft to PL    Assessment: Chronic and stable, BB, statin at home    Plan: No changes      Hx of non-ST elevation myocardial infarction (NSTEMI)    Assessment: Old history    Plan: No changes      Non morbid obesity, unspecified obesity type    Assessment: Chronic    Plan: No changes      Essential hypertension with goal blood pressure less than 140/90    Assessment: Chronic and stable    Plan: Place hold parameter for low BP  readings      Gross hematuria    Assessment: Probably due to supratherapeutic INR with indwelling Cee catheter, now resolved    Plan: As above    DVT Prophylaxis: Warfarin  Code Status: Full Code    Disposition: Expected discharge in 3-5 days once renal function and volume status improve.    José Luis Mendez MD    Interval History   She is feeling a bit better today.  Participated with PT.  She still has some dyspnea, particularly with activity.  No CP/cough/N/V/abd pain.  She think swelling of her arms has improved.  No pain in leg/calf. She denies dizziness.  Her loose stools seem to have improved. No dysuria.     -Data reviewed today: I reviewed all new labs and imaging results over the last 24 hours.    Physical Exam   Temp: 98.6  F (37  C) Temp src: Oral BP: 99/43 Pulse: 63 Heart Rate: 75 Resp: 20 SpO2: 92 % O2 Device: None (Room air)    Vitals:    06/02/17 0406 06/02/17 0407 06/03/17 0546   Weight: 100.5 kg (221 lb 9.6 oz) 102.5 kg (226 lb) 98.2 kg (216 lb 6.4 oz)     Vital Signs with Ranges  Temp:  [96.4  F (35.8  C)-98.6  F (37  C)] 98.6  F (37  C)  Pulse:  [63-77] 63  Heart Rate:  [75] 75  Resp:  [20] 20  BP: ()/(43-76) 99/43  SpO2:  [92 %-96 %] 92 %  I/O last 3 completed shifts:  In: 1561.5 [P.O.:1130; I.V.:431.5]  Out: 5325 [Urine:5325]    Constitutional: Chronically ill appearing without signs of acute distress lying in bed, appears obese  Respiratory: Normal respiratory effort, diminished breath sounds at the bases with fine crackles.   Cardiovascular: Irregularly irregular rate and rhythm, no murmur  GI: Soft abdomen, obese, nontender  Skin/Integumen: Pale color, no rashes apparent  Neuro: Alert and maintains wakefulness and attention, answers questions appropriately  Other: Pitting edema present in the lower legs bilaterally as well as in the upper extremities    Medications     - MEDICATION INSTRUCTIONS -       furosemide (LASIX) infusion ADULT 10 mg/hr (06/03/17 1205)     Warfarin Therapy Reminder          warfarin  5 mg Oral ONCE at 18:00     metoprolol  12.5 mg Oral BID     insulin aspart  1-10 Units Subcutaneous TID AC     insulin aspart  1-7 Units Subcutaneous At Bedtime     diltiazem  240 mg Oral Daily     ferrous sulfate  325 mg Oral Daily with breakfast     glipiZIDE  10 mg Oral Daily     isosorbide mononitrate  30 mg Oral Daily       Data   Data reviewed today:  I personally reviewed telemetry.    Recent Labs  Lab 06/03/17  0620 06/02/17  0548 06/01/17  0553  05/31/17  1541   WBC  --  7.7 8.0  --  8.6   HGB  --  9.2* 9.2*  --  10.0*   MCV  --  81 82  --  82   PLT  --  288 280  --  286   INR 2.14* 3.04* 6.24*  --  6.00*   * 146* 149*  < > 147*   POTASSIUM 4.5 4.5 4.1  --  4.1   CHLORIDE 115* 117* 117*  --  116*   CO2 22 19* 22  --  23   BUN 65* 57* 48*  --  47*   CR 2.99* 2.79* 2.42*  < > 2.29*   ANIONGAP 10 10 10  --  8   MALICK 7.5* 7.2* 7.2*  --  7.2*   * 201* 118*  --  172*   ALBUMIN  --   --   --   --  2.5*   PROTTOTAL  --   --   --   --  5.6*   BILITOTAL  --   --   --   --  0.3   ALKPHOS  --   --   --   --  216*   ALT  --   --   --   --  33   AST  --   --   --   --  21   TROPI  --   --   --   --  <0.015The 99th percentile for upper reference range is 0.045 ug/L.  Troponin values in the range of 0.045 - 0.120 ug/L may be associated with risks of adverse clinical events.   < > = values in this interval not displayed.    No results found for this or any previous visit (from the past 24 hour(s)).  Urine culture noted NGTD    Blood sugars have ranged from 169-196 over the last 24 hours

## 2017-06-03 NOTE — PROGRESS NOTES
Occupational Therapy Evaluation     06/03/17 1015   Quick Adds   Type of Visit Initial Occupational Therapy Evaluation   Living Environment   Lives With spouse   Living Arrangements house   Home Accessibility grab bars present (bathtub);grab bars present (toilet)   Number of Stairs to Enter Home (0)   Number of Stairs Within Home (2 story, no need to get up stairs.)   Transportation Available car;family or friend will provide   Living Environment Comment Main level living space.   Self-Care   Dominant Hand right   Usual Activity Tolerance fair   Current Activity Tolerance poor   Regular Exercise yes   Exercise Amount/Frequency 3-5 times/wk   Equipment Currently Used at Home grab bar;walker, rolling;dressing device   Activity/Exercise/Self-Care Comment [facility] OT, PT and bath aide services prior hospital admit.  Weighs self everyday per, her report.  walk in shower with built in seat, toilet safety bars.  hand held shower head.  Patient has shoe horn/long, and sock aide.   Functional Level Prior   Ambulation 1-->assistive equipment   Transferring 1-->assistive equipment   Toileting 1-->assistive equipment   Bathing 1-->assistive equipment   Dressing 1-->assistive equipment   Eating 0-->independent   Communication 0-->understands/communicates without difficulty   Swallowing 0-->swallows foods/liquids without difficulty   Cognition 0 - no cognition issues reported   Fall history within last six months yes   Number of times patient has fallen within last six months 1   Which of the above functional risks had a recent onset or change? none   General Information   Onset of Illness/Injury or Date of Surgery - Date 05/31/17   Referring Physician Dr Hernandez   Patient/Family Goals Statement would like to return home with home health services, if able   Additional Occupational Profile Info/Pertinent History of Current Problem The patient is a 76 y/o female, she was experieicning  increased weakness and weight gain, excessive fluid retention limiting her ability to preform daily living tasks/activities, with increase SOB with activity.  She had been receivng per her report; home health care services including: bath aide, nursing, PT and OT.  Medical history : CAD, DM, HTN and renal failure per EPIC chart review.  Previous hip fracture, OA and CTS in medical history; may limit her functional mobility and self cares.   Precautions/Limitations fall precautions   General Observations Patient had just received and gotten out of shower and nursing wrapping legs while pt. supine in bed.  Stated being tired and fell asleep at times during OT eval.     Cognitive Status Examination   Orientation orientation to person, place and time   Level of Consciousness lethargic/somnolent   Able to Follow Commands mild impairment   Personal Safety (Cognitive) mild impairment   Memory impaired   Attention Quiet environment required;Divided attention impaired, difficulty with simultaneous tasks;Alternating attention impaired, difficulty shifting between tasks   Organization/Problem Solving Categorization of information impaired   Executive Function Self awareness/monitoring impaired;Planning ability impaired   Cognitive Comment Patient may have presented with decreased cognitive skills s/p shower fatigue.  OT to monitor cognitive skills, safety and problem solving.  May complete a cognitive screen as needed for disposition placement.   Visual Perception   Visual Perception Wears glasses   Integumentary/Edema   Integumentary/Edema other (describe)   Integumentary/Edema Comments Patient has moderate edema of the arms and legs.   Range of Motion (ROM)   ROM Quick Adds No deficits were identified   ROM Comment WNL   Strength   Strength Comments MMT sh flexion -4/5   Coordination   Upper Extremity Coordination No deficits were identified   Mobility   Bed Mobility Comments Not tested due to fatigue and PT eval following  OT eval.   Transfer Skills   Transfer Comments Not tested due to fatigue and PT eval following OT eval.   Tub/Shower Transfer   Tub/Shower Transfer Transfer Skill: Tub/Shower Transfers;Transfer Safety Analysis Tub/Shower   Tub/Shower Transfer Comments per nursing report min assist with FWW   Transfer Safety Analysis Tub/Shower, Rehab Eval   Impaired Transfers: Tub/Shower decreased strength   Balance   Balance Comments Not tested due to fatigue and PT eval following OT eval.   Upper Body Dressing   Level of [place]: Dress Upper Body minimum assist (75% patients effort)   Lower Body Dressing   Level of [place]: Dress Lower Body maximum assist (25% patients effort)   Toileting   Level of [place]: Toilet minimum assist (75% patients effort)   Grooming   Level of [place]: Grooming independent   Eating/Self Feeding   Level of [place]: Eating independent   Instrumental Activities of Daily Living (IADL)   Previous Responsibilities meal prep;housekeeping;laundry;driving;medication management   IADL Comments Home health serices : assisted with medication, bath aide and a house keeping 2 a month, shared daily tasks  Spouse shares IADLs.  Meals on wheels   Activities of Daily Living Analysis   Impairments Contributing to Impaired Activities of Daily Living cognition impaired;strength decreased   General Therapy Interventions   Planned Therapy Interventions ADL retraining;bed mobility training;home program guidelines   Clinical Impression   Criteria for Skilled Therapeutic Interventions Met yes, treatment indicated   OT Diagnosis Decreased strength, endurance and safety for functional independence with BADL/IADLs.   Influenced by the following impairments edema, decreased strength/endurance   Assessment of Occupational Performance 5 or more Performance Deficits   Identified Performance Deficits dressing, bathing, functional transfers, bed mobility, meal prep/clean up, house mgt. driving   Clinical  "Decision Making (Complexity) High complexity   Therapy Frequency daily   Predicted Duration of Therapy Intervention (days/wks) 3 days   Anticipated Equipment Needs at Discharge (TBA)   Anticipated Discharge Disposition Transitional Care Facility  (vs home health care, monitor edema)   Risks and Benefits of Treatment have been explained. Yes   Patient, Family & other staff in agreement with plan of care Yes   [facility] TM \"6 Clicks\"   2016, Trustees of [facility], under license to "[employer].  All rights reserved.   6 Clicks Short Forms Daily Activity Inpatient Short Form   [facility] AM-PAC  \"6 Clicks\" Daily Activity Inpatient Short Form   1. Putting on and taking off regular lower body clothing? 2 - A Lot   2. Bathing (including washing, rinsing, drying)? 2 - A Lot   3. Toileting, which includes using toilet, bedpan or urinal? 3 - A Little   4. Putting on and taking off regular upper body clothing? 3 - A Little   5. Taking care of personal grooming such as brushing teeth? 3 - A Little   6. Eating meals? 4 - None   Daily Activity Raw Score (Score out of 24.Lower scores equate to lower levels of function) 17   Total Evaluation Time   Total Evaluation Time (Minutes) 15       Sindy LUCAS/L  [facility]  [phone]  "

## 2017-06-03 NOTE — PHARMACY-ANTICOAGULATION SERVICE
Clinical Pharmacy - Warfarin Dosing Consult     Pharmacy has been consulted to manage this patient s warfarin therapy.  Indication: Atrial Fibrillation  Therapy Goal: INR 2-3  Warfarin Prior to Admission: Yes  Warfarin PTA Regimen: 7.5 mg Fridays, 5 mg all other days of the week  Significant drug interactions: None  Recent documented change in oral intake/nutrition: Unknown  Dose Comments: Supratherapeutic INR - lower doses past week and warfarin held 5/30 and 5/31  Medication Interactions: acetaminophen    INR   Date Value Ref Range Status   06/03/2017 2.14 (H) 0.86 - 1.14 Final   06/02/2017 3.04 (H) 0.86 - 1.14 Final       Recommend warfarin 5 mg today.  Pharmacy will monitor Kathryn Banks daily and order warfarin doses to achieve specified goal.      Please contact pharmacy as soon as possible if the warfarin needs to be held for a procedure or if the warfarin goals change.

## 2017-06-03 NOTE — PROGRESS NOTES
Patient states she would really like to return home and resume [facility].  She does not want to go to a TCU.  She states her weakness is due to all the extra fluid and once that resolves she will be able to do fine.  Hospitalist will order P/T and O/T evals to assist with disposition.  Patient is still several days from discharge.    BON Simms  [facility] [phone]/ [facility] [phone]

## 2017-06-03 NOTE — PLAN OF CARE
Problem: Goal Outcome Summary  Goal: Goal Outcome Summary     Occupational Therapy         Goal status:  OT referral and evaluation completed, refer to findings and details     Functional status: UE AROM WNL, all planes of movement.  Moderate edema in upper extremities.  Per nursing report for function with shower min to moderate assist.  Per report post shower patient requested a wheelchair to get back to the bed.  However, nursing was able to ambulate patient with use of FWW and encouragement to bed.  Requires assist for all lower body hygiene, leg wraps and dressing at this time.     Areas of progress: activity tolerance, minimal increased SOB with UE AROM or combing hair.    Barrier to discharge Home: level of assist needed for daily cares and safety with functional mobility     Equipment needs: TBA as needed     Discharge disposition/rationale: TCU recommended to regain strength and functional independence with BADL/IADLs prior return to previous home living and services.  However, patient would like to return home if able.  If patient does return home with home health services; OT home health recommended.     Transport recommendations: car/family     Sindy SEN  [facility]  [phone]

## 2017-06-03 NOTE — PLAN OF CARE
Problem: Goal Outcome Summary  Goal: Goal Outcome Summary     Goal status:goals established after evaluation today     Functional status: SBA for bed mobility and sit to stand, CGA sit to stand and for FWW ambulation     Areas of progress:stood X 5' today with CGA and FWW    Barrier to discharge Home: weakness, poor endurance, decreased general mobility and ambulation     Equipment needs: has everything needed     Discharge disposition/rationale: pt wants to go home with C PT, OT. May benefit from TCU.     Transport recommendations: will assess closer to [place]

## 2017-06-03 NOTE — PLAN OF CARE
Problem: Goal Outcome Summary  Goal: Goal Outcome Summary  Urine output has been 3500ml+ over 11 hours. Patient is down roughly 6 lbs from yesterday. She remains very short of breath with any activity and is quite fatigued unable to even support her own weight sitting on the edge of the bed. Pt needs to lean on the bed to stay upright. She has used her CPAP but did not need oxygen throughout the night. Lasix drip continues at 10mg/hr. Tele has been a fib with rates in the 70-80's. She has had no complaints of pain.

## 2017-06-04 ENCOUNTER — APPOINTMENT (OUTPATIENT)
Dept: PHYSICAL THERAPY | Facility: CLINIC | Age: 77
DRG: 683 | End: 2017-06-04
Payer: MEDICARE

## 2017-06-04 LAB
ANION GAP SERPL CALCULATED.3IONS-SCNC: 9 MMOL/L (ref 3–14)
BUN SERPL-MCNC: 68 MG/DL (ref 7–30)
CALCIUM SERPL-MCNC: 7.8 MG/DL (ref 8.5–10.1)
CHLORIDE SERPL-SCNC: 113 MMOL/L (ref 94–109)
CO2 SERPL-SCNC: 25 MMOL/L (ref 20–32)
CREAT SERPL-MCNC: 3.1 MG/DL (ref 0.52–1.04)
GFR SERPL CREATININE-BSD FRML MDRD: 15 ML/MIN/1.7M2
GLUCOSE BLDC GLUCOMTR-MCNC: 137 MG/DL (ref 70–99)
GLUCOSE BLDC GLUCOMTR-MCNC: 155 MG/DL (ref 70–99)
GLUCOSE BLDC GLUCOMTR-MCNC: 236 MG/DL (ref 70–99)
GLUCOSE BLDC GLUCOMTR-MCNC: 259 MG/DL (ref 70–99)
GLUCOSE SERPL-MCNC: 144 MG/DL (ref 70–99)
INR PPP: 2.47 (ref 0.86–1.14)
POTASSIUM SERPL-SCNC: 3.9 MMOL/L (ref 3.4–5.3)
SODIUM SERPL-SCNC: 147 MMOL/L (ref 133–144)

## 2017-06-04 PROCEDURE — 99232 SBSQ HOSP IP/OBS MODERATE 35: CPT | Performed by: INTERNAL MEDICINE

## 2017-06-04 PROCEDURE — 12000007 ZZH R&B INTERMEDIATE

## 2017-06-04 PROCEDURE — 25000132 ZZH RX MED GY IP 250 OP 250 PS 637: Mod: GY | Performed by: INTERNAL MEDICINE

## 2017-06-04 PROCEDURE — 97530 THERAPEUTIC ACTIVITIES: CPT | Mod: GP | Performed by: PHYSICAL THERAPIST

## 2017-06-04 PROCEDURE — A9270 NON-COVERED ITEM OR SERVICE: HCPCS | Mod: GY | Performed by: INTERNAL MEDICINE

## 2017-06-04 PROCEDURE — 40000193 ZZH STATISTIC PT WARD VISIT: Performed by: PHYSICAL THERAPIST

## 2017-06-04 PROCEDURE — 00000146 ZZHCL STATISTIC GLUCOSE BY METER IP

## 2017-06-04 PROCEDURE — 36415 COLL VENOUS BLD VENIPUNCTURE: CPT | Performed by: INTERNAL MEDICINE

## 2017-06-04 PROCEDURE — P9047 ALBUMIN (HUMAN), 25%, 50ML: HCPCS | Performed by: INTERNAL MEDICINE

## 2017-06-04 PROCEDURE — 97110 THERAPEUTIC EXERCISES: CPT | Mod: GP | Performed by: PHYSICAL THERAPIST

## 2017-06-04 PROCEDURE — 80048 BASIC METABOLIC PNL TOTAL CA: CPT | Performed by: INTERNAL MEDICINE

## 2017-06-04 PROCEDURE — 25000128 H RX IP 250 OP 636: Performed by: INTERNAL MEDICINE

## 2017-06-04 PROCEDURE — 85610 PROTHROMBIN TIME: CPT | Performed by: INTERNAL MEDICINE

## 2017-06-04 RX ORDER — WARFARIN SODIUM 5 MG/1
5 TABLET ORAL
Status: COMPLETED | OUTPATIENT
Start: 2017-06-04 | End: 2017-06-04

## 2017-06-04 RX ADMIN — ALBUMIN HUMAN 12.5 G: 0.25 SOLUTION INTRAVENOUS at 15:12

## 2017-06-04 RX ADMIN — METOPROLOL TARTRATE 12.5 MG: 25 TABLET, FILM COATED ORAL at 08:19

## 2017-06-04 RX ADMIN — ACETAMINOPHEN 650 MG: 325 TABLET ORAL at 05:57

## 2017-06-04 RX ADMIN — ALBUMIN HUMAN 12.5 G: 0.25 SOLUTION INTRAVENOUS at 09:19

## 2017-06-04 RX ADMIN — ALBUMIN HUMAN 12.5 G: 0.25 SOLUTION INTRAVENOUS at 20:39

## 2017-06-04 RX ADMIN — ALBUMIN HUMAN 12.5 G: 0.25 SOLUTION INTRAVENOUS at 03:16

## 2017-06-04 RX ADMIN — ISOSORBIDE MONONITRATE 30 MG: 30 TABLET, EXTENDED RELEASE ORAL at 08:19

## 2017-06-04 RX ADMIN — METOPROLOL TARTRATE 12.5 MG: 25 TABLET, FILM COATED ORAL at 20:39

## 2017-06-04 RX ADMIN — FUROSEMIDE 10 MG/HR: 10 INJECTION, SOLUTION INTRAVENOUS at 00:06

## 2017-06-04 RX ADMIN — DILTIAZEM HYDROCHLORIDE 240 MG: 240 CAPSULE, EXTENDED RELEASE ORAL at 08:19

## 2017-06-04 RX ADMIN — WARFARIN SODIUM 5 MG: 5 TABLET ORAL at 17:17

## 2017-06-04 RX ADMIN — INSULIN ASPART 4 UNITS: 100 INJECTION, SOLUTION INTRAVENOUS; SUBCUTANEOUS at 12:20

## 2017-06-04 RX ADMIN — FUROSEMIDE 10 MG/HR: 10 INJECTION, SOLUTION INTRAVENOUS at 10:22

## 2017-06-04 RX ADMIN — GLIPIZIDE 10 MG: 5 TABLET, EXTENDED RELEASE ORAL at 08:19

## 2017-06-04 RX ADMIN — FERROUS SULFATE 325 MG: 325 TABLET, FILM COATED ORAL at 08:19

## 2017-06-04 RX ADMIN — INSULIN ASPART 5 UNITS: 100 INJECTION, SOLUTION INTRAVENOUS; SUBCUTANEOUS at 17:17

## 2017-06-04 NOTE — PLAN OF CARE
Problem: Goal Outcome Summary  Goal: Goal Outcome Summary  Outcome: Improving     Goal status:pt progressing very well toward goals 1 and 2     Functional status: CGA today for transfers and ambulation. This was her 1st time ambulating in the hallway. Pt normally uses FWW      Areas of progress:less assist needed today    Barrier to discharge Home: decreased mobility, weakness     Equipment needs: has equipment needed     Discharge disposition/rationale: pt really wants to go home, recommend home PT     Transport recommendations: will address closer to [place]

## 2017-06-04 NOTE — PROGRESS NOTES
[facility]    Hospitalist Progress Note    Date of Service (when I saw the patient): 06/04/2017    Assessment & Plan   Kathryn Banks is a 77 year old female who was admitted on 5/31/2017 with worsening anasarca and an approximately 30-40 pound weight gain over the last 3 weeks after she had been hospitalized for sepsis due to Klebsiella UTI and rapid atrial fibrillation.  There is increasing suspicion today for acute renal failure as the primary cause for her worsening volume status.    Principal Problem:    Acute renal failure with tubular necrosis (H)    Assessment: Creatinine continues to worsen, but the rate is much slower and UOP is great. Cr 3.1 today, was 1.58 on May 24 as an outpatient, urinalysis demonstrated hematuria and proteinuria and fractional excretion of sodium was elevated consistent with acute tubular necrosis, renal ultrasound was reassuring and nondiagnostic, urine culture is pending without strong suspicion for UTI although it is difficult to exclude with certainty and she had a small amount of pyuria on UA yesterday. Cause for acute renal failure remains indeterminate but there is increasing suspicion for renal hypoperfusion due to uncontrolled atrial fibrillation and atrial flutter and even possible cardiorenal syndrome although EF has been normal, urine output is improved with increased in lasix drip at 10mg/hr and albumin.    Plan: Continue IV Lasix infusion at 10mg/hr with albumin q6H as it has increased her UOP significantly with minimal Cr bump.  Monitor Cr. Continue Cee catheter to monitor urine output closely. May need to consider nephrology evaluation for possible initiation of renal replacement therapy if she fails to improve on the above plan. This would require hospital transfer as those services are not available at this facility. Patient would like to continue trying now since her UOP is much more than yesterday.    Active Problems:     Generalized edema - anasarca    Assessment: Probably due to acute renal failure and low albumin from proteinuria.    Plan: Added albumin above and cont treatment as summarized above, may need renal replacement therapy if unresponsive to medical therapy      Atrial flutter (H)    Assessment: Rate controlled so far during this hospital stay and trending downward with bradycardia on 6/2 on combination of diltiazem and metoprolol, had uncontrolled rate from atrial fibrillation over the past few weeks prior to this hospital stay for which metoprolol dose was increased and diltiazem newly added in the last 2 weeks with dose increased about a week ago and it is possible that acute renal failure was precipitated by renal hypoperfusion from previously uncontrolled rapid atrial fibrillation, amiodarone had been started during her last hospital stay 3-4 weeks ago with improved rate control by hospital discharge at that time but the patient appears to have discontinuing amiodarone of her own accord after she was discharged from the nursing home on May 16 after which time she has had progressively worsening control of heart rate, ablation procedure has been planned for next week on Wed    Plan: Titrate doses of metoprolol and diltiazem to optimize rate control, unclear today whether her current acute renal failure will delay cardiac ablation procedure that it been planned for next week although if acute renal failure fails to improve over the next few days then her ablation procedure may need to be postponed.      Metabolic acidosis, normal anion gap (NAG)    Assessment: was seen on 6/2 lab, but resolved on 6/3.    Plan: Follow chemistries in AM      Anemia    Assessment: Stable and probably due to anemia of chronic kidney disease    Plan: No changes      Type 2 diabetes mellitus with other circulatory complications (H)    Assessment: Chronic, A1c 6.2 on March 31, mostly adequate but not fully optimal glycemic control so far in  the hospital but will be at risk for hypoglycemia from glipizide if renal function continues to worsen    Plan: re-consider stopping glipizide if blood sugars trend downward or renal function continues to worsen, continue NovoLog correction scale      Pulmonary hypertension (H)    Assessment: Chronic and improved on echocardiogram this hospital stay as compared with previously so probably not a primary trigger for her worsening volume status    Plan: Treat volume overload as outlined above      Chronic heart failure (H) with preserved EF    Assessment: Normal EF without mention of diastolic dysfunction on echo and no significant valvular disease, signs of volume overload now are thought to be due more to acute renal failure rather than of cardiogenic origin although cardiorenal syndrome is difficult to exclude with certainty but is not typical in the context of normal LV systolic function    Plan: Continue attempted aggressive diuresis, continue beta blocker at lower dose, ACE inhibitor and/or ARB are contraindicated      Hypertensive heart and chronic kidney disease with heart failure and stage 1 through stage 4 chronic kidney disease, or unspecified chronic kidney disease (H)    Assessment: Chronic hypertensive heart disease that appears stable and chronic kidney disease with superimposed acute renal failure, well-controlled blood pressure    Plan: Place hold parameters for low BPs      Supratherapeutic INR    Assessment: Taking warfarin chronically for prophylaxis because of atrial fibrillation and atrial flutter, INR up to 6.2 during this hospital stay at which time she developed gross hematuria, was administered 2.5 mg oral vitamin K and warfarin was held and INR is now trending downward and gross hematuria has resolved, suspect elevated INR was due to a combination of chronic warfarin combined with acute renal failure with proteinuria that can affect the levels of circulating clotting factors    Plan: Pharmacy  managing warfarin dosing      Proteinuria 2.1 g/g Cr    Assessment: Initial UA did not demonstrate proteinuria but repeat UA and urine random protein demonstrated non-nephrotic range proteinuria, proteinuria is consistent with acute renal failure with acute tubular necrosis, ACE inhibitor or ARB are contraindicated    Plan: No changes      Bradycardia    Assessment: As low as the upper 40s intermittently probably due to prolonged effectiveness of metoprolol in the context of worsening acute renal failure, also taking diltiazem although adjustment of diltiazem dosing for acute renal failure is not normally recommended    Plan: Continue telemetry, cont reduced metoprolol doseof 12.5 mg twice daily and has hold parametr for HR< 60, continue present dose of diltiazem but may need to hold diltiazem or reduce dose of diltiazem if bradycardia persists, reconsider more aggressive intervention including addition of a chronotropic agent and/or pacing if necessary if she is deteriorating      Sleep apnea    Assessment: Chronic and stable    Plan: Plan to reorder DME for home CPap equipment including tubing and mask at discharge      Postsurgical aortocoronary bypass status    Assessment: Chronic and stable    Plan: No changes      Chronic kidney disease, stage III (moderate)    Assessment: Acute renal failure    Plan: As outlined above      CAD - NSTEMI, CABG x 5 2007, angio in 2013 with patent grafts other than occluded graft to PL    Assessment: Chronic and stable, BB, statin at home    Plan: No changes      Hx of non-ST elevation myocardial infarction (NSTEMI)    Assessment: Old history    Plan: No changes      Non morbid obesity, unspecified obesity type    Assessment: Chronic    Plan: No changes      Essential hypertension with goal blood pressure less than 140/90    Assessment: Chronic and stable    Plan: Place hold parameter for low BP readings      Gross hematuria    Assessment: Probably due to supratherapeutic INR  with indwelling Cee catheter, now resolved    Plan: As above    DVT Prophylaxis: Warfarin  Code Status: Full Code    Disposition: Expected discharge in 3-5 days once renal function and volume status improve.    José Luis Mendez MD    Interval History   She is feeling better.  She still noticed some swelling in her arms and a little in her abd, but her abd is much less harden than before. Mild SOB, but has been walking.  No CP/cough/N/V/abd pain.  No pain in leg/calf. She denies dizziness.  No dysuria.     -Data reviewed today: I reviewed all new labs and imaging results over the last 24 hours.    Physical Exam   Temp: 96.4  F (35.8  C) Temp src: Oral BP: 135/71 Pulse: 61 Heart Rate: 61 Resp: 16 SpO2: 94 % O2 Device: None (Room air)    Vitals:    06/02/17 0407 06/03/17 0546 06/04/17 0543   Weight: 102.5 kg (226 lb) 98.2 kg (216 lb 6.4 oz) 91.4 kg (201 lb 9.6 oz)     Vital Signs with Ranges  Temp:  [96.3  F (35.7  C)-98.9  F (37.2  C)] 96.4  F (35.8  C)  Pulse:  [61-80] 61  Heart Rate:  [61-80] 61  Resp:  [16-20] 16  BP: ()/(43-79) 135/71  SpO2:  [92 %-96 %] 94 %  I/O last 3 completed shifts:  In: 2210 [P.O.:1880; I.V.:330]  Out: 7725 [Urine:7725]    Constitutional: No acute distress sitting in chair, appears obese  Respiratory: Normal respiratory effort, diminished breath sounds at the bases with fine crackles.   Cardiovascular: Irregularly irregular rate and rhythm, no murmur  GI: Soft abdomen, obese, nontender  Skin/Integumen: no rashes apparent  Neuro: Alert and maintains wakefulness and attention, answers questions appropriately  Other: Pitting edema present in the lower legs bilaterally as well as in the upper extremities and lower abd    Medications     - MEDICATION INSTRUCTIONS -       furosemide (LASIX) infusion ADULT 10 mg/hr (06/04/17 1022)     Warfarin Therapy Reminder         warfarin  5 mg Oral ONCE at 18:00     albumin human  12.5 g Intravenous Q6H     metoprolol  12.5 mg Oral BID     insulin aspart   1-10 Units Subcutaneous TID AC     insulin aspart  1-7 Units Subcutaneous At Bedtime     diltiazem  240 mg Oral Daily     ferrous sulfate  325 mg Oral Daily with breakfast     glipiZIDE  10 mg Oral Daily     isosorbide mononitrate  30 mg Oral Daily       Data   Data reviewed today:  I personally reviewed telemetry.    Recent Labs  Lab 06/04/17  0530 06/03/17  0620 06/02/17  0548 06/01/17  0553  05/31/17  1541   WBC  --   --  7.7 8.0  --  8.6   HGB  --   --  9.2* 9.2*  --  10.0*   MCV  --   --  81 82  --  82   PLT  --   --  288 280  --  286   INR 2.47* 2.14* 3.04* 6.24*  --  6.00*   * 147* 146* 149*  < > 147*   POTASSIUM 3.9 4.5 4.5 4.1  --  4.1   CHLORIDE 113* 115* 117* 117*  --  116*   CO2 25 22 19* 22  --  23   BUN 68* 65* 57* 48*  --  47*   CR 3.10* 2.99* 2.79* 2.42*  < > 2.29*   ANIONGAP 9 10 10 10  --  8   MALICK 7.8* 7.5* 7.2* 7.2*  --  7.2*   * 190* 201* 118*  --  172*   ALBUMIN  --   --   --   --   --  2.5*   PROTTOTAL  --   --   --   --   --  5.6*   BILITOTAL  --   --   --   --   --  0.3   ALKPHOS  --   --   --   --   --  216*   ALT  --   --   --   --   --  33   AST  --   --   --   --   --  21   TROPI  --   --   --   --   --  <0.015The 99th percentile for upper reference range is 0.045 ug/L.  Troponin values in the range of 0.045 - 0.120 ug/L may be associated with risks of adverse clinical events.   < > = values in this interval not displayed.    No results found for this or any previous visit (from the past 24 hour(s)).  Urine culture noted NGTD

## 2017-06-04 NOTE — PHARMACY-ANTICOAGULATION SERVICE
Clinical Pharmacy - Warfarin Dosing Consult     Pharmacy has been consulted to manage this patient s warfarin therapy.  Indication: Atrial Fibrillation  Therapy Goal: INR 2-3  Warfarin Prior to Admission: Yes  Warfarin PTA Regimen: 7.5 mg Fridays, 5 mg all other days of the week  Significant drug interactions: None  Recent documented change in oral intake/nutrition: Unknown  Dose Comments: Supratherapeutic INR - lower doses past week and warfarin held 5/30 and 5/31  Medication Interactions: acetaminophen    INR   Date Value Ref Range Status   06/04/2017 2.47 (H) 0.86 - 1.14 Final   06/03/2017 2.14 (H) 0.86 - 1.14 Final       Recommend warfarin 5 mg today.  Pharmacy will monitor Kathryn Banks daily and order warfarin doses to achieve specified goal.      Please contact pharmacy as soon as possible if the warfarin needs to be held for a procedure or if the warfarin goals change.

## 2017-06-04 NOTE — PLAN OF CARE
Problem: Goal Outcome Summary  Goal: Goal Outcome Summary  Generalized pitting edema slowly improving although still present throughout body. Lower extremity ace wraps removed at HS and will be reapplied this morning. Urine output has been 4700ml+ for 11 hours. Vitals stable. Pt is not nearly as weak as previous night and has moved from the bed to the chair with assist of 1 only. Weight down approximately 15 lbs from yesterday.

## 2017-06-04 NOTE — PLAN OF CARE
Problem: Goal Outcome Summary  Goal: Goal Outcome Summary  Outcome: Improving  Patient has been up with stand-by assist, tolerating well. Lasix drip running at 10 mg/hr with large urine output. Had 4000 ml out on day shift. Down 15 lbs since yesterday. Swelling and tightness in arms, hands, and stomach improving. Still very tight in thighs and legs. ACE wraps on per order. On room air, vital signs stable.

## 2017-06-05 ENCOUNTER — APPOINTMENT (OUTPATIENT)
Dept: OCCUPATIONAL THERAPY | Facility: CLINIC | Age: 77
DRG: 683 | End: 2017-06-05
Payer: MEDICARE

## 2017-06-05 ENCOUNTER — APPOINTMENT (OUTPATIENT)
Dept: PHYSICAL THERAPY | Facility: CLINIC | Age: 77
DRG: 683 | End: 2017-06-05
Payer: MEDICARE

## 2017-06-05 ENCOUNTER — TELEPHONE (OUTPATIENT)
Dept: CARDIOLOGY | Facility: CLINIC | Age: 77
End: 2017-06-05

## 2017-06-05 LAB
ANION GAP SERPL CALCULATED.3IONS-SCNC: 7 MMOL/L (ref 3–14)
BUN SERPL-MCNC: 69 MG/DL (ref 7–30)
CALCIUM SERPL-MCNC: 7.8 MG/DL (ref 8.5–10.1)
CHLORIDE SERPL-SCNC: 107 MMOL/L (ref 94–109)
CO2 SERPL-SCNC: 32 MMOL/L (ref 20–32)
CREAT SERPL-MCNC: 2.86 MG/DL (ref 0.52–1.04)
GFR SERPL CREATININE-BSD FRML MDRD: 16 ML/MIN/1.7M2
GLUCOSE BLDC GLUCOMTR-MCNC: 153 MG/DL (ref 70–99)
GLUCOSE BLDC GLUCOMTR-MCNC: 189 MG/DL (ref 70–99)
GLUCOSE BLDC GLUCOMTR-MCNC: 305 MG/DL (ref 70–99)
GLUCOSE BLDC GLUCOMTR-MCNC: 89 MG/DL (ref 70–99)
GLUCOSE SERPL-MCNC: 90 MG/DL (ref 70–99)
INR PPP: 3.05 (ref 0.86–1.14)
LACTATE BLD-SCNC: 1.1 MMOL/L (ref 0.7–2.1)
POTASSIUM SERPL-SCNC: 4.1 MMOL/L (ref 3.4–5.3)
SODIUM SERPL-SCNC: 146 MMOL/L (ref 133–144)

## 2017-06-05 PROCEDURE — 25000132 ZZH RX MED GY IP 250 OP 250 PS 637: Mod: GY | Performed by: FAMILY MEDICINE

## 2017-06-05 PROCEDURE — 40000133 ZZH STATISTIC OT WARD VISIT

## 2017-06-05 PROCEDURE — A9270 NON-COVERED ITEM OR SERVICE: HCPCS | Mod: GY | Performed by: INTERNAL MEDICINE

## 2017-06-05 PROCEDURE — 97530 THERAPEUTIC ACTIVITIES: CPT | Mod: GO

## 2017-06-05 PROCEDURE — A9270 NON-COVERED ITEM OR SERVICE: HCPCS | Mod: GY | Performed by: FAMILY MEDICINE

## 2017-06-05 PROCEDURE — 85610 PROTHROMBIN TIME: CPT | Performed by: INTERNAL MEDICINE

## 2017-06-05 PROCEDURE — 12000007 ZZH R&B INTERMEDIATE

## 2017-06-05 PROCEDURE — P9047 ALBUMIN (HUMAN), 25%, 50ML: HCPCS | Performed by: INTERNAL MEDICINE

## 2017-06-05 PROCEDURE — 36415 COLL VENOUS BLD VENIPUNCTURE: CPT | Performed by: INTERNAL MEDICINE

## 2017-06-05 PROCEDURE — 25000128 H RX IP 250 OP 636: Performed by: INTERNAL MEDICINE

## 2017-06-05 PROCEDURE — 00000146 ZZHCL STATISTIC GLUCOSE BY METER IP

## 2017-06-05 PROCEDURE — 97116 GAIT TRAINING THERAPY: CPT | Mod: GP | Performed by: PHYSICAL THERAPIST

## 2017-06-05 PROCEDURE — 25000132 ZZH RX MED GY IP 250 OP 250 PS 637: Mod: GY | Performed by: INTERNAL MEDICINE

## 2017-06-05 PROCEDURE — 80048 BASIC METABOLIC PNL TOTAL CA: CPT | Performed by: INTERNAL MEDICINE

## 2017-06-05 PROCEDURE — 83605 ASSAY OF LACTIC ACID: CPT | Performed by: INTERNAL MEDICINE

## 2017-06-05 PROCEDURE — 97110 THERAPEUTIC EXERCISES: CPT | Mod: GP | Performed by: PHYSICAL THERAPIST

## 2017-06-05 PROCEDURE — 40000193 ZZH STATISTIC PT WARD VISIT: Performed by: PHYSICAL THERAPIST

## 2017-06-05 PROCEDURE — 97535 SELF CARE MNGMENT TRAINING: CPT | Mod: GO

## 2017-06-05 RX ORDER — BUMETANIDE 0.5 MG/1
4 TABLET ORAL
Status: DISCONTINUED | OUTPATIENT
Start: 2017-06-05 | End: 2017-06-06

## 2017-06-05 RX ORDER — PRAMIPEXOLE DIHYDROCHLORIDE 0.5 MG/1
3 TABLET ORAL AT BEDTIME
Status: DISCONTINUED | OUTPATIENT
Start: 2017-06-05 | End: 2017-06-06 | Stop reason: HOSPADM

## 2017-06-05 RX ADMIN — ALBUMIN HUMAN 12.5 G: 0.25 SOLUTION INTRAVENOUS at 03:51

## 2017-06-05 RX ADMIN — METOPROLOL TARTRATE 12.5 MG: 25 TABLET, FILM COATED ORAL at 20:27

## 2017-06-05 RX ADMIN — FERROUS SULFATE 325 MG: 325 TABLET, FILM COATED ORAL at 08:35

## 2017-06-05 RX ADMIN — DILTIAZEM HYDROCHLORIDE 240 MG: 240 CAPSULE, EXTENDED RELEASE ORAL at 08:35

## 2017-06-05 RX ADMIN — Medication 0.5 MG: at 17:27

## 2017-06-05 RX ADMIN — ALBUMIN HUMAN 12.5 G: 0.25 SOLUTION INTRAVENOUS at 09:36

## 2017-06-05 RX ADMIN — BUMETANIDE 4 MG: 0.5 TABLET ORAL at 18:50

## 2017-06-05 RX ADMIN — GLIPIZIDE 10 MG: 5 TABLET, EXTENDED RELEASE ORAL at 08:42

## 2017-06-05 RX ADMIN — ISOSORBIDE MONONITRATE 30 MG: 30 TABLET, EXTENDED RELEASE ORAL at 08:35

## 2017-06-05 RX ADMIN — FUROSEMIDE 10 MG/HR: 10 INJECTION, SOLUTION INTRAVENOUS at 10:44

## 2017-06-05 RX ADMIN — ALBUMIN HUMAN 12.5 G: 0.25 SOLUTION INTRAVENOUS at 20:27

## 2017-06-05 RX ADMIN — INSULIN ASPART 1 UNITS: 100 INJECTION, SOLUTION INTRAVENOUS; SUBCUTANEOUS at 12:13

## 2017-06-05 RX ADMIN — ALBUMIN HUMAN 12.5 G: 0.25 SOLUTION INTRAVENOUS at 15:10

## 2017-06-05 RX ADMIN — METOPROLOL TARTRATE 12.5 MG: 25 TABLET, FILM COATED ORAL at 08:35

## 2017-06-05 RX ADMIN — INSULIN ASPART 2 UNITS: 100 INJECTION, SOLUTION INTRAVENOUS; SUBCUTANEOUS at 17:27

## 2017-06-05 NOTE — TELEPHONE ENCOUNTER
Call from Judson PONCE at [facility], 2nd floor. Pt is inpatient with resolving CHF and CKD. She is scheduled for an AF ablation on 6-7-17 with Dr King. She is asking if pt can have the procedure with current health status. She continues on a Lasix drip, difficult manage of CKD. She MAY be discharged tomorrow. Will route to Dr KING. Will likely reschedule. SueLangenbrunnerRN

## 2017-06-05 NOTE — PHARMACY-ANTICOAGULATION SERVICE
Clinical Pharmacy - Warfarin Dosing Consult     Pharmacy has been consulted to manage this patient s warfarin therapy.  Indication: Atrial Fibrillation  Therapy Goal: INR 2-3  Warfarin Prior to Admission: Yes  Warfarin PTA Regimen: 7.5 mg Fridays, 5 mg all other days of the week  Recent documented change in oral intake/nutrition: Unknown  Medication Interactions: acetaminophen    INR   Date Value Ref Range Status   06/05/2017 3.05 (H) 0.86 - 1.14 Final   06/04/2017 2.47 (H) 0.86 - 1.14 Final       Recommend warfarin 0.5 mg today, due to a large increase in INR in the past 24 hours, and supratherapeutic INR upon admission requiring reversal with vitamin K.  Pharmacy will monitor Kathryn Banks daily and order warfarin doses to achieve specified goal.      Please contact pharmacy as soon as possible if the warfarin needs to be held for a procedure or if the warfarin goals change.

## 2017-06-05 NOTE — PROGRESS NOTES
SPIRITUAL HEALTH SERVICES  SPIRITUAL ASSESSMENT Progress Note  [facility]      (Follow up)   provided communion to pt.   is available for pt/family needs.    Mauricio Hernandez M.Div., [facility]  Staff   Office tel: [phone]

## 2017-06-05 NOTE — PROGRESS NOTES
S-(situation): patient requesting to have appointments rescheduled    B-(background): CHF    A-(assessment): patient has two appointments scheduled for Wednesday June 7th, one with [facility] radiology 0830 [address], and one with [facility] (for an ablation?) 0930.  Did call and leave a message (maybe it was too late in the day for them to answer in person) with them regarding to reschedule per patients request due to the fact that she is currently in-patient here at [facility].    R-(recommendations):

## 2017-06-05 NOTE — PLAN OF CARE
visited with patient and her  regarding d/c planning and current PT and OT recommendation for TCU placement.    Patient declining TCU placement.  Patient stated that she does not feel there is any more to learn from therapy at a TCU.  She had been receiving home PT 3x per week through [facility] and patient is planning to continue with that.  Did discuss home safety and why TCU has been recommended, but patient not interested.       will continue to follow for d/c planning.

## 2017-06-05 NOTE — PROGRESS NOTES
SPIRITUAL HEALTH SERVICES  SPIRITUAL ASSESSMENT Progress Note  [facility]      Cordell Guillory, Eucharistic  from [facility] [employer], provided prayer & communion to pt.      Mauricio Hernandez M.Div., [facility]  Staff   Office tel: [phone]

## 2017-06-05 NOTE — PLAN OF CARE
Problem: Goal Outcome Summary  Goal: Goal Outcome Summary     Goal status: Progressing towards goals      Functional status: Pt performs sit to stand transfer with SBA, FWW and appropriate hand placement. Pt ambulates 200ft with FWW and CGA, using swing through gait pattern but L LE taking longer stride than R LE resulting in decreased stance time on R. Pt believes she has used this gait pattern since fracturing her hip 1 year ago and refracturing it in February. Verbal cues required for equal step lengths and for heel strike. Stand to sit transfer with good control on descent to chair with pt independently reaching back for surface behind her, SBA.      Areas of progress: Increased ambulation distance with increased stability.     Barrier to discharge Home: Supervision required with ambulation and functional mobility for safety     Equipment needs: None     Discharge disposition/rationale: TCU for continued rehabilitation and supervision during functional mobility to ensure safe transition to home. Pt strongly desires to go home, if this is decided, Home PT recommended to be resumed as pt was previously having home PT.      Transport recommendations: Family transport if desired.              Beulah Burt, SPT

## 2017-06-05 NOTE — TELEPHONE ENCOUNTER
Yes, keep the ablation schedule.  I suspect that diltiazem or flutter might have contributed to her CHF.

## 2017-06-05 NOTE — TELEPHONE ENCOUNTER
Call to [facility]/ spoke with Judson PONCE. Per Dr King, will plan on going forward with the ablation at this time. If pt does not discharge tomorrow, they will notify us. SueLangenbrunnerRN

## 2017-06-05 NOTE — PLAN OF CARE
Problem: Goal Outcome Summary  Goal: Goal Outcome Summary  Outcome: Improving  Pt has lost 33 lbs since admission, diuresing well and generalized edema trace if any.  Tele shows continued A-Fib with uncontrolled ventricular rate.  BGs today- am 89, noon 153.  LSC/diminished.  LE cellulitis improving with redness mainly below the knees at this point.  Two loose stools this shift- continue to monitor.  Pt also c/o discomfort with indwelling catheter- bag and clamp were leaking so tubing and catheter bag changed out this shift.  No current open/weeping areas, has one closed fluid-filled blister to right anterior shin, multiple small scabs to left LE, and a small abrasion/skin tear to right posterior LE- legs wrapped in ACE wraps.

## 2017-06-05 NOTE — PROGRESS NOTES
[facility]    Hospitalist Progress Note      Assessment & Plan   Kathryn Banks is a 77 year old female who was admitted on 5/31/2017 for worsening of anasarca and significant weight gain. She has been on Lasix drip with albumin.  She had lost about 15 pounds in yesterday and total of 30 pounds in the last couple days. She is feeling better today.    Acute renal failure with tubular necrosis (H)    Assessment: her creatinine is improving today drop down to 2.8 from 3.1 yesterday; was 1.58 on May 24 as an outpatient. Urinalysis demonstrated hematuria and proteinuria and fractional excretion of sodium was elevated consistent with acute tubular necrosis, renal ultrasound was reassuring and nondiagnostic, urine culture is pending. Cause for acute renal failure remains unclear but it is most likely due to renal hypoperfusion due to uncontrolled atrial fibrillation and atrial flutter and even possible cardiorenal syndrome. Her EF has been normal. Excellent urine output with lasix drip at 10mg/hr and albumin.  She is still edematous.      Plan: Continue IV Lasix infusion at 10mg/hr with albumin q6H as it has increased her UOP significantly with minimal Cr bump - will consider to stop the drop for decrease its rate to 5 mg later today.  Will switch to oral diuretics one she is off the drip. Monitor Cr and continue Cee catheter to monitor urine output closely. Continue to monitor  Her weight closely.  Her base weight is 180.    Generalized edema - anasarca    Assessment: Probably due to acute renal failure and low albumin from proteinuria. Please see above.  Resolving with lasix drip and albumin. Still edematous to groin bilaterally.      Plan: As mentioned above. Will continue with the Lasix infusion at 10 mg per hour with albumin every 6 hours. Consider job down to 5 mg/hr later today and most likely will be able to stop it by tomorrow a.m.  Will start her on oral diuretic once the  infusion was stopped.    Atrial flutter (H)    Assessment: Rate controlled so far during this hospital stay and trending downward with bradycardia on 6/2 on combination of diltiazem and metoprolol.  Had uncontrolled rate from atrial fibrillation over the past few weeks prior to this.  During her last hospital stay a week prior to this admission, metoprolol dose was increased and diltiazem newly added. It is possible that acute renal failure was precipitated by renal hypoperfusion from previously uncontrolled rapid atrial fibrillation.  Amiodarone had been started during her last hospital stay 3-4 weeks ago with improved rate control,  but the patient appears to have discontinuing amiodarone of her own accord after she was discharged from the nursing home on May 16 after which time she has had progressively worsening control of heart rate.  Ablation procedure has been planned in 2 days.  HR has been controlled with low dose Metoprolol (12.5 mg bid) and Diltiazem (120 mg).      Plan: Continue with the current dose of the metoprolol and diltiazem. Continue with the telemetry monitoring and will adjust the medication doses appropriately to maintain the heart rate in the range of 90 to 100.  Continue with the current anticoagulant. Her kidney function tests remained to be elevated although is improving slightly today. Most likely that the ablation will need to be rescheduled as there is a good chance she will not be able to be discharged from the hospital by then. The ablation is schedule to be done at [facility].    Chronic heart failure (H) with preserved EF    Assessment: Normal EF without mention of diastolic dysfunction on echo and no significant valvular disease, signs of volume overload now are thought to be due more to acute renal failure rather than of cardiogenic origin although cardiorenal syndrome is difficult to exclude with certainty but is not typical in the context of normal LV systolic function     Plan: Continue attempted aggressive diuresis, continue beta blocker at lower dose, ACE inhibitor and/or ARB are contraindicated due to acute renal failure.  Clinically she is feeling better with aggressive diuresis.    Pulmonary hypertension (H)    Assessment: Chronic and improved on echocardiogram this hospital stay as compared with previously so probably not a primary trigger for her worsening volume status    Plan: Treat volume overload as outlined above.  She is breathing better. She lost 30 pounds in last couple days. Will continue to monitor.    Proteinuria 2.1 g/g Cr    Assessment: Initial UA did not demonstrate proteinuria but repeat UA and urine random protein demonstrated non-nephrotic range proteinuria, proteinuria is consistent with acute renal failure with acute tubular necrosis, ACE inhibitor or ARB are contraindicated    Plan: No changes.    Her other medical condition stable. Continue with her current medication. Assessment and plan discussed with patient detail. All of her questions were answered. Anticipate to be discharged in 1-2 days.    DVT Prophylaxis: Warfarin  Code Status: Full Code    Disposition: Expected discharge in 1-2 day.    Kenji Massey Mai    Interval History   Chart reviewed and received that I doubt from Dr. Wong.  Patient stated that overall she is feeling better. She is breathing better. Denies a headache or dizziness. No chest pain and shortness of breath is improving. The orthopneic and dyspneic. No problem with urination although she has a Cee catheter intact. No nausea, vomiting, diarrhea or constipation. No fever or chills. No concern from the nursing staff. She lost 15 pounds from yesterday. The loss a total of 30 pounds in the last couple days.    -Data reviewed today: I reviewed all new labs and imaging results over the last 24 hours. I personally reviewed no images or EKG's today.    Physical Exam   Temp: 98.3  F (36.8  C) Temp src: Oral BP: 123/64 Pulse: 96 Heart Rate:  96 Resp: 18 SpO2: 92 % O2 Device: None (Room air)    Vitals:    06/03/17 0546 06/04/17 0543 06/05/17 0359   Weight: 98.2 kg (216 lb 6.4 oz) 91.4 kg (201 lb 9.6 oz) 84.1 kg (185 lb 4.8 oz)     Vital Signs with Ranges  Temp:  [97.1  F (36.2  C)-99.6  F (37.6  C)] 98.3  F (36.8  C)  Pulse:  [] 96  Heart Rate:  [] 96  Resp:  [16-22] 18  BP: (109-145)/(59-78) 123/64  SpO2:  [92 %-96 %] 92 %  I/O last 3 completed shifts:  In: 1564.7 [P.O.:1200; I.V.:364.7]  Out: 07534 [Urine:35412]    Constitutional: No acute distress, speaking in full sentences. Answers the questions appropriately.  Respiratory: Clear but decreased breath sounds throughout. No crackle.  Cardiovascular: Irregular rate and rhythm. No murmur.  GI: Soft, nondistended, nontender with normal bowel sounds.  Skin/Integumen: No rash, ecchymosis or petechiae. +1-2 pitting edema all the way to the groin area bilaterally. Minimal swelling on the abdomen or in the arm.       Medications     - MEDICATION INSTRUCTIONS -       furosemide (LASIX) infusion ADULT 10 mg/hr (06/05/17 1044)     Warfarin Therapy Reminder         warfarin  0.5 mg Oral ONCE at 18:00     albumin human  12.5 g Intravenous Q6H     metoprolol  12.5 mg Oral BID     insulin aspart  1-10 Units Subcutaneous TID AC     insulin aspart  1-7 Units Subcutaneous At Bedtime     diltiazem  240 mg Oral Daily     ferrous sulfate  325 mg Oral Daily with breakfast     glipiZIDE  10 mg Oral Daily     isosorbide mononitrate  30 mg Oral Daily       Data     Recent Labs  Lab 06/05/17  0540 06/04/17  0530 06/03/17  0620 06/02/17  0548 06/01/17  0553  05/31/17  1541   WBC  --   --   --  7.7 8.0  --  8.6   HGB  --   --   --  9.2* 9.2*  --  10.0*   MCV  --   --   --  81 82  --  82   PLT  --   --   --  288 280  --  286   INR 3.05* 2.47* 2.14* 3.04* 6.24*  --  6.00*   * 147* 147* 146* 149*  < > 147*   POTASSIUM 4.1 3.9 4.5 4.5 4.1  --  4.1   CHLORIDE 107 113* 115* 117* 117*  --  116*   CO2 32 25 22 19* 22   --  23   BUN 69* 68* 65* 57* 48*  --  47*   CR 2.86* 3.10* 2.99* 2.79* 2.42*  < > 2.29*   ANIONGAP 7 9 10 10 10  --  8   MALICK 7.8* 7.8* 7.5* 7.2* 7.2*  --  7.2*   GLC 90 144* 190* 201* 118*  --  172*   ALBUMIN  --   --   --   --   --   --  2.5*   PROTTOTAL  --   --   --   --   --   --  5.6*   BILITOTAL  --   --   --   --   --   --  0.3   ALKPHOS  --   --   --   --   --   --  216*   ALT  --   --   --   --   --   --  33   AST  --   --   --   --   --   --  21   TROPI  --   --   --   --   --   --  <0.015The 99th percentile for upper reference range is 0.045 ug/L.  Troponin values in the range of 0.045 - 0.120 ug/L may be associated with risks of adverse clinical events.   < > = values in this interval not displayed.    No results found for this or any previous visit (from the past 24 hour(s)).

## 2017-06-05 NOTE — PLAN OF CARE
Problem: Goal Outcome Summary  Goal: Goal Outcome Summary  Occupational Therapy      Goal status:              1:  Patient was able to complete standing tolerance of 5 minutes with use of FWW and SBA for safety.                 2:  Not addressed this date              3:  Patient declined and then nursing was changing bags on the IV limiting ability to move around the room.      Functional status: Patient currently min-mod A with BADL.  Patient complete sit <> stand from chair with SBA.  Patient complete cognitive screen SLUMS 21/30 indicating need for further cognitive assessment when patient medical concerns are managed/baseline.      Areas of progress:  Patient independence with transfers and activity tolerance    Barrier to discharge Home: Level of assistance for safety with BADL.       Equipment needs: None anticipated     Discharge disposition/rationale: TCU for continued progress with safety and independence with completing dressing tasks and safety with IADL.  Patient reports that she has a strong desire to go home and if that is the plan she will need support with medication and medical management, assistance with showering, home management, and recommendation for home OT and home health care.      Transport recommendations: Van transport due to increase fatigue/private vehicle depending on patient's activity tolerance day of discharge.

## 2017-06-05 NOTE — PLAN OF CARE
Problem: Cardiac: Heart Failure (Adult)  Goal: Signs and Symptoms of Listed Potential Problems Will be Absent or Manageable (Cardiac: Heart Failure)  Signs and symptoms of listed potential problems will be absent or manageable by discharge/transition of care (reference Cardiac: Heart Failure (Adult) CPG).   Outcome: Improving  VSS on room air.  Pt denies pain, denies SOB or LEON.  Lung sounds clear, equal bilaterally.  Trace 1+ edema BLE, legs elevated, ACE wraps on.  Mild 2+ edema hips, perineum.  Cee catheter draining clear, straw/yellow-colored urine.  Ambulated halls this afternoon, sitting up in chair for dinner.  Blood sugar before dinner 189, 2 units novolog coverage.  Continuing to monitor and encouraging increased activity as tolerated.

## 2017-06-05 NOTE — PLAN OF CARE
Problem: Goal Outcome Summary  Goal: Goal Outcome Summary  Generalized edema is improving. Lower extremity ace wraps removed at HS. Urine output has been 5700ml + overinight. Vitals stable. Pt has denied pain. She ambulated in the harmon with assist of 1 prior to bedtime. Weight is down another 15lbs today. Lasix drip continues.

## 2017-06-06 ENCOUNTER — APPOINTMENT (OUTPATIENT)
Dept: PHYSICAL THERAPY | Facility: CLINIC | Age: 77
DRG: 683 | End: 2017-06-06
Payer: MEDICARE

## 2017-06-06 ENCOUNTER — APPOINTMENT (OUTPATIENT)
Dept: OCCUPATIONAL THERAPY | Facility: CLINIC | Age: 77
DRG: 683 | End: 2017-06-06
Payer: MEDICARE

## 2017-06-06 ENCOUNTER — TELEPHONE (OUTPATIENT)
Dept: CARDIOLOGY | Facility: CLINIC | Age: 77
End: 2017-06-06

## 2017-06-06 VITALS
HEIGHT: 64 IN | WEIGHT: 168.65 LBS | SYSTOLIC BLOOD PRESSURE: 105 MMHG | DIASTOLIC BLOOD PRESSURE: 68 MMHG | RESPIRATION RATE: 18 BRPM | OXYGEN SATURATION: 93 % | TEMPERATURE: 97.3 F | BODY MASS INDEX: 28.79 KG/M2 | HEART RATE: 71 BPM

## 2017-06-06 DIAGNOSIS — I48.92 ATRIAL FLUTTER (H): Primary | ICD-10-CM

## 2017-06-06 LAB
ANION GAP SERPL CALCULATED.3IONS-SCNC: 8 MMOL/L (ref 3–14)
BUN SERPL-MCNC: 63 MG/DL (ref 7–30)
CALCIUM SERPL-MCNC: 8 MG/DL (ref 8.5–10.1)
CHLORIDE SERPL-SCNC: 101 MMOL/L (ref 94–109)
CO2 SERPL-SCNC: 35 MMOL/L (ref 20–32)
CREAT SERPL-MCNC: 2.76 MG/DL (ref 0.52–1.04)
ERYTHROCYTE [DISTWIDTH] IN BLOOD BY AUTOMATED COUNT: 17.9 % (ref 10–15)
GFR SERPL CREATININE-BSD FRML MDRD: 17 ML/MIN/1.7M2
GLUCOSE BLDC GLUCOMTR-MCNC: 135 MG/DL (ref 70–99)
GLUCOSE BLDC GLUCOMTR-MCNC: 71 MG/DL (ref 70–99)
GLUCOSE BLDC GLUCOMTR-MCNC: 81 MG/DL (ref 70–99)
GLUCOSE SERPL-MCNC: 66 MG/DL (ref 70–99)
HCT VFR BLD AUTO: 32.8 % (ref 35–47)
HGB BLD-MCNC: 9.7 G/DL (ref 11.7–15.7)
INR PPP: 2.48 (ref 0.86–1.14)
MCH RBC QN AUTO: 23.5 PG (ref 26.5–33)
MCHC RBC AUTO-ENTMCNC: 29.6 G/DL (ref 31.5–36.5)
MCV RBC AUTO: 79 FL (ref 78–100)
PLATELET # BLD AUTO: 298 10E9/L (ref 150–450)
POTASSIUM SERPL-SCNC: 4.4 MMOL/L (ref 3.4–5.3)
RBC # BLD AUTO: 4.13 10E12/L (ref 3.8–5.2)
SODIUM SERPL-SCNC: 144 MMOL/L (ref 133–144)
WBC # BLD AUTO: 8.2 10E9/L (ref 4–11)

## 2017-06-06 PROCEDURE — A9270 NON-COVERED ITEM OR SERVICE: HCPCS | Mod: GY | Performed by: FAMILY MEDICINE

## 2017-06-06 PROCEDURE — 97116 GAIT TRAINING THERAPY: CPT | Mod: GP | Performed by: PHYSICAL THERAPIST

## 2017-06-06 PROCEDURE — P9047 ALBUMIN (HUMAN), 25%, 50ML: HCPCS | Performed by: INTERNAL MEDICINE

## 2017-06-06 PROCEDURE — 25000132 ZZH RX MED GY IP 250 OP 250 PS 637: Mod: GY | Performed by: FAMILY MEDICINE

## 2017-06-06 PROCEDURE — 25000128 H RX IP 250 OP 636: Performed by: INTERNAL MEDICINE

## 2017-06-06 PROCEDURE — 40000133 ZZH STATISTIC OT WARD VISIT

## 2017-06-06 PROCEDURE — 97110 THERAPEUTIC EXERCISES: CPT | Mod: GP | Performed by: PHYSICAL THERAPIST

## 2017-06-06 PROCEDURE — A9270 NON-COVERED ITEM OR SERVICE: HCPCS | Mod: GY | Performed by: INTERNAL MEDICINE

## 2017-06-06 PROCEDURE — 80048 BASIC METABOLIC PNL TOTAL CA: CPT | Performed by: INTERNAL MEDICINE

## 2017-06-06 PROCEDURE — 25000132 ZZH RX MED GY IP 250 OP 250 PS 637: Mod: GY | Performed by: INTERNAL MEDICINE

## 2017-06-06 PROCEDURE — 85610 PROTHROMBIN TIME: CPT | Performed by: INTERNAL MEDICINE

## 2017-06-06 PROCEDURE — 99239 HOSP IP/OBS DSCHRG MGMT >30: CPT | Performed by: FAMILY MEDICINE

## 2017-06-06 PROCEDURE — 97535 SELF CARE MNGMENT TRAINING: CPT | Mod: GO

## 2017-06-06 PROCEDURE — 97530 THERAPEUTIC ACTIVITIES: CPT | Mod: GP | Performed by: PHYSICAL THERAPIST

## 2017-06-06 PROCEDURE — 85027 COMPLETE CBC AUTOMATED: CPT | Performed by: INTERNAL MEDICINE

## 2017-06-06 PROCEDURE — 40000193 ZZH STATISTIC PT WARD VISIT: Performed by: PHYSICAL THERAPIST

## 2017-06-06 PROCEDURE — 36415 COLL VENOUS BLD VENIPUNCTURE: CPT | Performed by: INTERNAL MEDICINE

## 2017-06-06 PROCEDURE — 00000146 ZZHCL STATISTIC GLUCOSE BY METER IP

## 2017-06-06 RX ORDER — WARFARIN SODIUM 2.5 MG/1
TABLET ORAL
Qty: 10 TABLET | Refills: 0 | Status: ON HOLD | OUTPATIENT
Start: 2017-06-06 | End: 2017-06-16

## 2017-06-06 RX ORDER — METOPROLOL TARTRATE 25 MG/1
25 TABLET, FILM COATED ORAL 2 TIMES DAILY
Qty: 60 TABLET | Refills: 0 | Status: ON HOLD | OUTPATIENT
Start: 2017-06-06 | End: 2017-06-15

## 2017-06-06 RX ORDER — LIDOCAINE 40 MG/G
CREAM TOPICAL
Status: CANCELLED | OUTPATIENT
Start: 2017-06-06

## 2017-06-06 RX ORDER — FUROSEMIDE 20 MG
20 TABLET ORAL DAILY
Status: DISCONTINUED | OUTPATIENT
Start: 2017-06-06 | End: 2017-06-06 | Stop reason: HOSPADM

## 2017-06-06 RX ORDER — SODIUM CHLORIDE 450 MG/100ML
INJECTION, SOLUTION INTRAVENOUS CONTINUOUS
Status: CANCELLED | OUTPATIENT
Start: 2017-06-06

## 2017-06-06 RX ADMIN — DILTIAZEM HYDROCHLORIDE 240 MG: 240 CAPSULE, EXTENDED RELEASE ORAL at 09:40

## 2017-06-06 RX ADMIN — ISOSORBIDE MONONITRATE 30 MG: 30 TABLET, EXTENDED RELEASE ORAL at 09:40

## 2017-06-06 RX ADMIN — GLIPIZIDE 10 MG: 5 TABLET, EXTENDED RELEASE ORAL at 09:38

## 2017-06-06 RX ADMIN — ALBUMIN HUMAN 12.5 G: 0.25 SOLUTION INTRAVENOUS at 02:10

## 2017-06-06 RX ADMIN — FUROSEMIDE 20 MG: 20 TABLET ORAL at 10:15

## 2017-06-06 RX ADMIN — PRAMIPEXOLE DIHYDROCHLORIDE 3 MG: 0.5 TABLET ORAL at 00:08

## 2017-06-06 RX ADMIN — METOPROLOL TARTRATE 12.5 MG: 25 TABLET, FILM COATED ORAL at 09:40

## 2017-06-06 RX ADMIN — FERROUS SULFATE 325 MG: 325 TABLET, FILM COATED ORAL at 09:38

## 2017-06-06 RX ADMIN — METOPROLOL TARTRATE 12.5 MG: 25 TABLET, FILM COATED ORAL at 00:09

## 2017-06-06 NOTE — DISCHARGE SUMMARY
[facility]    Discharge Summary  Hospitalist    Date of Admission:  5/31/2017  Date of Discharge:  6/6/2017  Discharging Provider: Kenji Massey Mai  Date of Service (when I saw the patient): 06/06/17    Discharge Diagnoses   Anasarca  Acute renal failure with tubular necrosis  Atrial fibrillation  Chronic congestive heart failure - diastolic  Only hypertension  Anemia, chronic  Diabetes type 2  Sleep apnea  CAD with history of NSTEMI  Hypertension      History of Present Illness   Kathryn Banks is a 77 year old female who presents with swelling and SOB.  She was feeling well about two weeks ago.  Starting about 10 days prior to admission, she started to get swelling in her legs and since that time has progressed up her legs and now involves her trunk, arms and face.  She saw her PMD about one week prior to that and her lasix was increased.  Despite that her swelling has continued to worsen and now in the last two days she has started getting SOB.  She has not had cough, fever or CP.  She presented to the ED and is now admitted.     Hospital Course   Kathryn Banks was admitted on 5/31/2017.  The following problems were addressed during her hospitalization:    Acute renal failure with tubular necrosis (H)  Her creatinine is improving each day in the last couple days. Urinalysis demonstrated hematuria and proteinuria and fractional excretion of sodium was elevated consistent with acute tubular necrosis, renal ultrasound was reassuring and nondiagnostic, urine culture is pending. Cause for acute renal failure remains unclear but it is most likely due to renal hypoperfusion due to uncontrolled atrial fibrillation and atrial flutter and even possible cardiorenal syndrome. Her EF has been normal. Excellent urine output with lasix drip at 10mg/hr and albumin.  She lost about 38 pounds in the last three days. Lasix infusion and albumin transfusions. Last night. She had good diuresis and lost  a pound from yesterday. Her swelling has been is resolving. She will be discharged home with Lasix 40 mg daily.     Generalized edema - anasarca  Probably due to acute renal failure and low albumin from proteinuria. Please see above.  Resolving with lasix drip and albumin. At the time of discharge, her swelling is resolving. She is breathing much better.  She has good urine output with oral diuretic.    Atrial flutter (H)  Rate controlled so far during this hospital stay and trending downward with bradycardia on 6/2 on combination of diltiazem and metoprolol. Metoprolol was held for most of the hospitalization. Heart rate was stable and controlled with diltiazem. On the date of discharge, she couple episodes of tachycardia that responded to low-dose of metoprolol.  Amiodarone had been started during her previous hospital stay with improved rate control, but the stopped it on her own after she was discharged from the nursing home on May 16 after which time she has had progressively worsening control of heart rate.  Ablation procedure has been planned for tomorrow.  HR has been controlled with low dose Metoprolol (12.5 mg bid) and Diltiazem (120 mg).  She was anticoagulated with Coumadin.     Chronic heart failure (H) with preserved EF   Normal EF without mention of diastolic dysfunction on echo and no significant valvular disease.  Signs of volume overload now are thought to be due more to acute renal failure rather than of cardiogenic origin, although cardiorenal syndrome is difficult to exclude with certainty but is not typical in the context of normal LV systolic function.  She was treated aggressively with diuresis.  Continue beta blocker at lower dose, ACE inhibitor and/or ARB are contraindicated due to acute renal failure.   plan for BRANDEN and possible ablation as scheduled tomorrow.    Pulmonary hypertension (H)   Chronic and improved on echocardiogram.  She was treated aggressively with diuresis as mentioned  above.    Her other medical conditions were stable and outpatient medications were continued. At time discharge, she was feeling much better. She lost about 8 pounds overnight.and the total of 38 pounds in last 3 days. Her swelling and resolving. Denies of headache or dizziness. No chest pain, shortness of breath. Denies dyspnea or orthopnea. No abdominal pain, nausea, vomiting, diarrhea or constipation. She is ready to go home. She is scheduled for BRANDEN and possible ablation tomorrow with Dr. King, the cardiologist.  No concerns from nursing staff.    Vital signs reviewed and stable.  Alert, pleasant, no acute distress. Speaking full sentences.  Lungs clear, no wheezes or crackle. Good respiratory effort throughout.  Heart irregular rate and rhythm, no murmur.  Abdomen soft, nondistended no palpable masses or organomegaly with normal bowel without. No tender with palpation.  Skel:  The swelling/anasarca is resolving. +2 pedal edema noted below the knee bilaterally. No drainage.    Kenji Massey Mai    Significant Results and Procedures   echochardiogram - please see results note for further detail.    Pending Results   These results will be followed up by Dr. Dheeraj Jj  Unresulted Labs Ordered in the Past 30 Days of this Admission     No orders found from 4/1/2017 to 6/1/2017.          Code Status   Full Code       Primary Care Physician   Dheeraj Jj          Discharge Disposition   Discharged to home  Condition at discharge: Stable    Consultations This Hospital Stay   PHARMACY TO DOSE WARFARIN  CARE TRANSITION RN/SW IP CONSULT  PHYSICAL THERAPY ADULT IP CONSULT  OCCUPATIONAL THERAPY ADULT IP CONSULT    Time Spent on this Encounter   I, Kenji Massey Mai, personally saw the patient today and spent greater than 30 minutes discharging this patient.    Discharge Orders     INR Clinic Referral     Reason for your hospital stay   Kathryn was admitted to the hospital for significant weight gain secondary to edema.     Follow-up  and recommended labs and tests    Follow up with primary care provider, Dheeraj Jj, within 7 days to evaluate medication change, to evaluate treatment change, for hospital follow- up.     Activity   Your activity upon discharge: activity as tolerated     When to contact your care team   Please call the clinic if develops chest pain, shortness of breath, worsening of the swelling, not able to breath when laying down flat, significant shortness of breath when walking or with any concerns.     Discharge Instructions     Full Code     Diet   Follow this diet upon discharge:     Low-salt and low sugar diet as tolerated.       Discharge Medications   Current Discharge Medication List      CONTINUE these medications which have CHANGED    Details   metoprolol (LOPRESSOR) 25 MG tablet Take 1 tablet (25 mg) by mouth 2 times daily  Qty: 60 tablet, Refills: 0    Associated Diagnoses: Atrial fibrillation with rapid ventricular response (H)         CONTINUE these medications which have NOT CHANGED    Details   diltiazem (CARDIZEM CD) 120 MG 24 hr capsule Take 2 capsules (240 mg) by mouth daily  Qty: 60 capsule, Refills: 3    Associated Diagnoses: Typical atrial flutter (H)      isosorbide mononitrate (IMDUR) 30 MG 24 hr tablet Take 3 tablets (90 mg) by mouth daily  Qty: 270 tablet, Refills: 3    Associated Diagnoses: Hx of non-ST elevation myocardial infarction (NSTEMI)      GLIPIZIDE XL PO Take 10 mg by mouth daily      PRAVASTATIN SODIUM PO Take 40 mg by mouth daily      GABAPENTIN PO Take 300 mg by mouth 2 times daily       WARFARIN SODIUM PO Give 1mg by mouth Monday and Tuesday. 2.5mg by mouth on Wednesday Recheck INR on Thursday 5/18      pramipexole (MIRAPEX) 1 MG tablet TAKE 3 TABLETS BY MOUTH EVERY EVENING  Qty: 270 tablet, Refills: 2    Associated Diagnoses: Restless legs syndrome (RLS); Hyperlipidemia LDL goal <100; Hypertension goal BP (blood pressure) < 140/90      furosemide (LASIX) 40 MG tablet 40 mg a day  Qty:  90 tablet, Refills: 3    Associated Diagnoses: Lymphedema      cyanocobalamin (VITAMIN B12) 1000 MCG/ML injection Inject 1 mL (1,000 mcg) into the muscle every 30 days  Qty: 1 mL, Refills: 11    Associated Diagnoses: Vitamin B12 deficiency (non anemic)      ferrous sulfate (IRON) 325 (65 FE) MG tablet Take 1 tablet (325 mg) by mouth daily (with breakfast)  Qty: 100 tablet    Associated Diagnoses: Anemia, unspecified type      calcium carbonate-vitamin D 500-400 MG-UNIT TABS tablt Take 1 tablet by mouth 3 times daily  Qty: 180 tablet, Refills: 3    Associated Diagnoses: Closed intertrochanteric fracture of left hip, initial encounter (H)      ORDER FOR DME Equipment being ordered: cpap machine, mask, humidifier, tubing and filters  Qty: 1 Device, Refills: 0    Associated Diagnoses: ANABEL (obstructive sleep apnea)      nitroglycerin (NITROSTAT) 0.4 MG SL tablet Place 1 tablet under the tongue See Admin Instructions. for chest pain  Qty: 25 tablet, Refills: 0    Associated Diagnoses: Postsurgical aortocoronary bypass status         STOP taking these medications       cephALEXin (KEFLEX) 500 MG capsule Comments:   Reason for Stopping:             Allergies   Allergies   Allergen Reactions     Ciprofloxacin Nausea and Vomiting     Zofran did not help     Oxycodone Visual Disturbance     Delusions, blackouts      Lisinopril Cough     Penicillins Rash     Sulfa Drugs GI Disturbance     LOSS OF TASTE     Data   Most Recent 3 CBC's:  Recent Labs   Lab Test  06/06/17   0610  06/02/17   0548  06/01/17   0553   WBC  8.2  7.7  8.0   HGB  9.7*  9.2*  9.2*   MCV  79  81  82   PLT  298  288  280      Most Recent 3 BMP's:  Recent Labs   Lab Test  06/06/17   0610  06/05/17   0540  06/04/17   0530   NA  144  146*  147*   POTASSIUM  4.4  4.1  3.9   CHLORIDE  101  107  113*   CO2  35*  32  25   BUN  63*  69*  68*   CR  2.76*  2.86*  3.10*   ANIONGAP  8  7  9   MALICK  8.0*  7.8*  7.8*   GLC  66*  90  144*     Most Recent 2 LFT's:  Recent  Labs   Lab Test  05/31/17   1541  05/24/17   1147   AST  21  20   ALT  33  43   ALKPHOS  216*  250*   BILITOTAL  0.3  0.3     Most Recent INR's and Anticoagulation Dosing History:  Anticoagulation Dose History     Recent Dosing and Labs Latest Ref Rng & Units 5/31/2017 6/1/2017 6/2/2017 6/3/2017 6/4/2017 6/5/2017 6/6/2017    Warfarin 0.5 mg - - - - - - 0.5 mg -    Warfarin 2.5 mg - - - 2.5 mg - - - -    Warfarin 5 mg - - - - 5 mg 5 mg - -    INR 0.86 - 1.14 6.00(HH) 6.24(HH) 3.04(H) 2.14(H) 2.47(H) 3.05(H) 2.48(H)    INR 0.86 - 1.14 - - - - - - -    INR Point of Care 0.86 - 1.14 - - - - - - -        Most Recent 3 Troponin's:  Recent Labs   Lab Test  05/31/17   1541  04/28/17   1050  07/02/16   0125   02/22/14   0024   TROPI  <0.015  The 99th percentile for upper reference range is 0.045 ug/L.  Troponin values in   the range of 0.045 - 0.120 ug/L may be associated with risks of adverse   clinical events.    0.026  0.019   < >   --    TROPONIN   --    --    --    --   0.01    < > = values in this interval not displayed.     Most Recent Cholesterol Panel:  Recent Labs   Lab Test  04/12/16   1132   CHOL  127   LDL  47   HDL  65   TRIG  73     Most Recent 6 Bacteria Isolates From Any Culture (See EPIC Reports for Culture Details):  Recent Labs   Lab Test  06/01/17   2045  05/31/17   1915  04/28/17   1747  04/28/17   1624  04/28/17   1621  04/28/17   1130   CULT  No growth  No MRSA isolated  No MRSA isolated  No growth after 6 days  No growth after 6 days  >100,000 colonies/mL Klebsiella pneumoniae*     Most Recent TSH, T4 and A1c Labs:  Recent Labs   Lab Test  04/28/17   1050  03/31/17   1510   TSH  0.75   --    A1C   --   6.2*     Results for orders placed or performed during the hospital encounter of 05/31/17   XR Chest 2 Views    Narrative    XR CHEST 2 VW 5/31/2017 4:27 PM    COMPARISON: 4/28/2017    HISTORY: Dyspnea      Impression    IMPRESSION: Cardiac silhouette within normal limits. Median sternotomy  wires  appear intact. Mild pulmonary edema as well as focal airspace  opacity at the right base, possibly representing aspiration or  infection. Likely trace bilateral pleural effusions. No pneumothorax  on either side.    ANUP WERNER Renal Complete    Narrative    ULTRASOUND RETROPERITONEAL COMPLETE 6/1/2017 12:21 PM     HISTORY: Acute kidney injury.    COMPARISON: 4/28/2017.    FINDINGS: The bilateral renal parenchyma are normal in echogenicity  without evidence for shadowing stone or mass. There is a simple  appearing 2.5 cm cyst in the central left kidney. This cyst was poorly  visualized on the prior. No hydronephrosis. Right kidney measures 10.2  x 4.0 x 3.7 cm and the left measures 10.0 x 4.4 x 4.2 cm. Cortical  thickness is 1.3 cm on the right and 1.1 cm on the left. Bladder is  decompressed by a Cee catheter.      Impression    IMPRESSION: No hydronephrosis.    ANNY MONTILLA MD     *Note: Due to a large number of results and/or encounters for the requested time period, some results have not been displayed. A complete set of results can be found in Results Review.

## 2017-06-06 NOTE — PLAN OF CARE
Problem: Goal Outcome Summary  Goal: Goal Outcome Summary  Occupational Therapy      Goal status:              1:  Patient completed with CGA as patient did demonstrate one LOB episode during this task that she was able to self-correct with hands stabilized on the walker. Patient did need one cue to keep the walker with her prior to that.                2:  Patient complete with use of sock aid and reacher with SBA for safety while standing and pulling up LB clothing.                3:  Patient completed toileting with CGA with one LOB when getting off the toilet that again she was able to self-correct.       Functional status: Patient completed BADL with CGA due to LOB and safety cues.  Patient complete LB dressing with use of AE..      Areas of progress:  Level of independence with BADL    Barrier to discharge Home: Safety with ADL's thru cues and supervision during functional mobility due to frequent LOB.      Equipment needs: sock aid, reacher, safety rails, grab bars, and shower chair (patient reports already having equipment.     Discharge disposition/rationale: TCU for continued progress with safety during BADL.  If patient chooses to go home 24 hour supervision for safety, home OT, and HHA to assist with BADL.     Transport recommendations: Private vehicle/family transport.       Occupational Therapy Discharge Summary    Reason for therapy discharge:    Discharged to home with home therapy.    Progress towards therapy goal(s). See goals on Care Plan in [facility] electronic health record for goal details.  Goals partially met.  Barriers to achieving goals:   limited tolerance for therapy and discharge from facility.    Therapy recommendation(s):    Continued therapy is recommended.  Rationale/Recommendations:  for further advancement with safety during BADL and assessment of cognitive function. .

## 2017-06-06 NOTE — PHARMACY-ANTICOAGULATION SERVICE
Clinical Pharmacy - Warfarin Dosing Consult     Pharmacy has been consulted to manage this patient s warfarin therapy.  Indication: Atrial Fibrillation  Therapy Goal: INR 2-3  Warfarin Prior to Admission: Yes  Warfarin PTA Regimen: 7.5 mg Fridays, 5 mg all other days of the week  Significant drug interactions: acetaminophen  Recent documented change in oral intake/nutrition: Unknown    INR   Date Value Ref Range Status   06/06/2017 2.48 (H) 0.86 - 1.14 Final   06/05/2017 3.05 (H) 0.86 - 1.14 Final       Recommend warfarin 3 mg today.  Pharmacy will monitor Kathryn Banks daily and order warfarin doses to achieve specified goal.      Please contact pharmacy as soon as possible if the warfarin needs to be held for a procedure or if the warfarin goals change.

## 2017-06-06 NOTE — PROGRESS NOTES
SPIRITUAL HEALTH SERVICES  SPIRITUAL ASSESSMENT Progress Note  [facility]      (Follow up)   provided communion to pt and her niece.  No follow up is needed.    Mauricio Hernandez M.Div., [facility]  Staff   Office tel: [phone]

## 2017-06-06 NOTE — PROGRESS NOTES
Discharge medication review for this patient is complete. Pharmacist assisted with medication reconciliation of discharge medications with prior to admission medications.     The following changes were made to the discharge medication list based on pharmacist review:  Changed: Changed warfarin from 7.5 mg on Friday and 5mg on other days of the week to 5mg today, 2.5mg 06/07/17 and 5 mg on 06/08/17 with a warfarin recheck on 06/09/17.      Patient's Discharge Medication List  - medications as listed on After Visit Summary (AVS)     Review of your medicines        CONTINUE these medicines which may have CHANGED, or have new prescriptions. If we are uncertain of the size of tablets/capsules you have at home, strength may be listed as something that might have changed.         Dose / Directions      calcium carbonate-vitamin D 500-400 MG-UNIT Tabs per tablet   This may have changed:  when to take this   Used for:  Closed intertrochanteric fracture of left hip, initial encounter (H)        Dose:  1 tablet   Take 1 tablet by mouth 3 times daily   Quantity:  180 tablet   Refills:  3       furosemide 40 MG tablet   Commonly known as:  LASIX   This may have changed:    - how much to take  - when to take this  - additional instructions   Used for:  Lymphedema        40 mg a day   Quantity:  90 tablet   Refills:  3       isosorbide mononitrate 30 MG 24 hr tablet   Commonly known as:  IMDUR   This may have changed:  how much to take   Used for:  Hx of non-ST elevation myocardial infarction (NSTEMI)        Dose:  90 mg   Take 3 tablets (90 mg) by mouth daily   Quantity:  270 tablet   Refills:  3       metoprolol 25 MG tablet   Commonly known as:  LOPRESSOR   This may have changed:    - medication strength  - how much to take   Used for:  Atrial fibrillation with rapid ventricular response (H)        Dose:  25 mg   Take 1 tablet (25 mg) by mouth 2 times daily   Quantity:  60 tablet   Refills:  0       warfarin 2.5 MG tablet    Commonly known as:  COUMADIN   This may have changed:    - medication strength  - how to take this  - additional instructions   Used for:  Atrial fibrillation with rapid ventricular response (H), Long-term (current) use of anticoagulants        Take 1 tablet on 6/7 and 2 tablets on 6/8 and recheck INR on 6/9   Quantity:  10 tablet   Refills:  0             CONTINUE these medicines which have NOT CHANGED         Dose / Directions      cyanocobalamin 1000 MCG/ML injection   Commonly known as:  VITAMIN B12   Used for:  Vitamin B12 deficiency (non anemic)        Dose:  1 mL   Inject 1 mL (1,000 mcg) into the muscle every 30 days   Quantity:  1 mL   Refills:  11       diltiazem 120 MG 24 hr capsule   Commonly known as:  CARDIZEM CD   Used for:  Typical atrial flutter (H)        Dose:  240 mg   Take 2 capsules (240 mg) by mouth daily   Quantity:  60 capsule   Refills:  3       ferrous sulfate 325 (65 FE) MG tablet   Commonly known as:  IRON   Used for:  Anemia, unspecified type        Dose:  325 mg   Take 1 tablet (325 mg) by mouth daily (with breakfast)   Quantity:  100 tablet   Refills:  0       GABAPENTIN PO        Dose:  300 mg   Take 300 mg by mouth 2 times daily   Refills:  0       GLIPIZIDE XL PO        Dose:  10 mg   Take 10 mg by mouth daily   Refills:  0       nitroglycerin 0.4 MG sublingual tablet   Commonly known as:  NITROSTAT   Used for:  Postsurgical aortocoronary bypass status        Dose:  0.4 mg   Place 1 tablet under the tongue See Admin Instructions. for chest pain   Quantity:  25 tablet   Refills:  0       order for DME   Used for:  ANABEL (obstructive sleep apnea)        Equipment being ordered: cpap machine, mask, humidifier, tubing and filters   Quantity:  1 Device   Refills:  0       pramipexole 1 MG tablet   Commonly known as:  MIRAPEX   Used for:  Restless legs syndrome (RLS), Hyperlipidemia LDL goal <100, Hypertension goal BP (blood pressure) < 140/90        TAKE 3 TABLETS BY MOUTH EVERY EVENING    Quantity:  270 tablet   Refills:  2       PRAVASTATIN SODIUM PO        Dose:  40 mg   Take 40 mg by mouth daily   Refills:  0             STOP taking              cephALEXin 500 MG capsule   Commonly known as:  KEFLEX                    Where to get your medicines        These medications were sent to [facility], [place] - [address]  [address] [address]       Phone:  [phone]      metoprolol 25 MG tablet     warfarin 2.5 MG tablet

## 2017-06-06 NOTE — PLAN OF CARE
Problem: Goal Outcome Summary  Goal: Goal Outcome Summary     Goal status: Progressing towards goals      Functional status: Pt requires SBA and no cues for supine to sit and sit to supine transfers. Sit to stand and stand to sit transfer with SBA and appropriate hand placement without cues. Pt ambulates 150ft with FWW and CGA, cues required for equal step length and heel strike. Pt demonstrates decreased stance time on L. Pt unable to tolerate prolonged stance on L LE to perform standing LE exercises.      Areas of progress: Decreased assistance required with bed mobility and sit to stand transfers.     Barrier to discharge Home: Decreased strength and impaired balance with ambulation, requires close supervision. Decreased activity tolerance     Equipment needs: None, has all equipment at home     Discharge disposition/rationale: TCU for continued rehabilitation to progress strengthening and functional mobility to ensure safety at home. Patient is declining TCU, so due to this : recommend resume Home PT and OT which pt was receiving prior to admission. Patient does remain at risk of falls.      Transport recommendations: Family transport        Beulah Burt, RYLEE Palumbo................... PT, DPT, CLT   6/6/2017, 12:08 PM  [phone]        Physical Therapy Discharge Summary     Reason for therapy discharge:    Discharged to home with home therapy.     Progress towards therapy goal(s). See goals on Care Plan in [facility] electronic health record for goal details.  Goals partially met.  Barriers to achieving goals:   discharge from facility.     Therapy recommendation(s):    Continued therapy is recommended.  Rationale/Recommendations:  To progress strengthening and functional mobility to ensure safety around the house and in the community. .

## 2017-06-06 NOTE — TELEPHONE ENCOUNTER
"I received an email from our  Alison \"Received a call from Citlaly at [facility], pt is inpt and cancelled her BRANDEN/Aflutter ablation for tomorrow, will cb to r/s. Alison \" ROSARIO Hurst   "

## 2017-06-06 NOTE — PROGRESS NOTES
S-(situation): Patient discharged to home via wheelchair with niece    B-(background): CHF    A-(assessment): Pt.discharged on the same meds.She is scheduled for outpt BRANDEN with possible ablation tomorrow at [facility].    R-(recommendations): Discharge instructions reviewed with pt.and niece. Listed belongings gathered and returned to patient.          Discharge Nursing Criteria:     Care Plan and Patient education resolved: Yes    New Medications- pt has been educated about purpose and side effects: N.A    Vaccines  Pneumonia Vaccine verified at discharge: Yes  Influenza status verified at discharge:Yes          MISC  Prescriptions if needed, hard copies sent with patient  NA  Home and hospital aquired medications returned to patient: NA  Medication Bin checked and emptied on discharge Yes  Patient reports post-discharge pain management plan is effective: Yes

## 2017-06-06 NOTE — TELEPHONE ENCOUNTER
I called Kathryn's home number and spoke to her , U[place] regarding her aflutter ablation tomorrow and he asked that I call the hospital to speak to Kathryn directly. I was connected to Kathryn's room and we went over any last minute instructions and questions regarding her aflutter ablation on 6/7/17. I discussed the following with the patient: arrival time (6:30), check in location, NPO at midnight, any medications that need to be held (glipizide and lasix), and that there will need to be a  to take her home at time of discharge. Kathryn is currently admitted for CHF exacerbation and Dr King said yesterday, 6/5, that if Kathryn is discharged today (6/6) that he would like to proceed with the ablation on 6/7 because this could be contributing to her current issues. Kathryn thinks she is likely to be discharged today, but worried she isn't healthy enough to undergo the ablation, I told her that Dr. King did look over everything yesterday and still wishes to proceed. Kathryn asked about where she should stay tonight as she [place] near [place] and that is [place] long drive, I told her she could certainly find a hotel down in [place] to make the drive easier tomorrow morning and she agreed to do that. Patient verbalized understanding regarding the process tomorrow. All questions answered. ROSARIO Hurst

## 2017-06-06 NOTE — PLAN OF CARE
Problem: Goal Outcome Summary  Goal: Goal Outcome Summary  Outcome: Improving  Pt is alert and oriented. Elevated HR has resolved (see previous note). Vitals stable, afebrile. Ace wraps removed before bed. Small open area seen on leg shin, Adaptic applied. Lungs clear, pt denies any SOB. Cee patent 2500 out from 3-11. 1700 out over night. Glucose 305 at hs. 5units sliding scale given, recheck at 2am glucose 81.

## 2017-06-06 NOTE — PROGRESS NOTES
S-(situation): Pt.doesn't feel like she can go home    B-(background): CHF    A-(assessment): Went into pts. Room to talk to her that we are waiting to get an order for HHC for her.Pts.niece is in the room and feels that she looks awful today worse than yesterday.Blood pressure and heart rate taken and were normal.Discussed that heart per tele was actually in a SR today and labs are better and vitals stable.Discussed findings with charge nurse.Pt.has discharge instructions.    R-(recommendations): Will have charge nurse talk to the pt.and family.

## 2017-06-07 ENCOUNTER — APPOINTMENT (OUTPATIENT)
Dept: CARDIOLOGY | Facility: CLINIC | Age: 77
End: 2017-06-07
Attending: INTERNAL MEDICINE
Payer: MEDICARE

## 2017-06-07 ENCOUNTER — CARE COORDINATION (OUTPATIENT)
Dept: CARE COORDINATION | Facility: CLINIC | Age: 77
End: 2017-06-07

## 2017-06-07 ENCOUNTER — HOSPITAL ENCOUNTER (OUTPATIENT)
Facility: CLINIC | Age: 77
Discharge: HOME | End: 2017-06-07
Attending: INTERNAL MEDICINE | Admitting: INTERNAL MEDICINE
Payer: MEDICARE

## 2017-06-07 VITALS
RESPIRATION RATE: 14 BRPM | TEMPERATURE: 97.8 F | SYSTOLIC BLOOD PRESSURE: 104 MMHG | WEIGHT: 168.2 LBS | HEART RATE: 74 BPM | HEIGHT: 64 IN | DIASTOLIC BLOOD PRESSURE: 62 MMHG | BODY MASS INDEX: 28.71 KG/M2 | OXYGEN SATURATION: 98 %

## 2017-06-07 DIAGNOSIS — I48.3 TYPICAL ATRIAL FLUTTER (H): ICD-10-CM

## 2017-06-07 LAB
ANION GAP SERPL CALCULATED.3IONS-SCNC: 7 MMOL/L (ref 3–14)
BUN SERPL-MCNC: 56 MG/DL (ref 7–30)
CALCIUM SERPL-MCNC: 8.3 MG/DL (ref 8.5–10.1)
CHLORIDE SERPL-SCNC: 102 MMOL/L (ref 94–109)
CO2 SERPL-SCNC: 33 MMOL/L (ref 20–32)
CREAT SERPL-MCNC: 2.39 MG/DL (ref 0.52–1.04)
GFR SERPL CREATININE-BSD FRML MDRD: 20 ML/MIN/1.7M2
GLUCOSE BLDC GLUCOMTR-MCNC: 122 MG/DL (ref 70–99)
GLUCOSE SERPL-MCNC: 137 MG/DL (ref 70–99)
INR BLD: 3.4 (ref 0.86–1.14)
POTASSIUM SERPL-SCNC: 3.9 MMOL/L (ref 3.4–5.3)
SODIUM SERPL-SCNC: 142 MMOL/L (ref 133–144)

## 2017-06-07 PROCEDURE — C1731 CATH, EP, 20 OR MORE ELEC: HCPCS

## 2017-06-07 PROCEDURE — 4A023FZ MEASUREMENT OF CARDIAC RHYTHM, PERCUTANEOUS APPROACH: ICD-10-PCS | Performed by: INTERNAL MEDICINE

## 2017-06-07 PROCEDURE — C1732 CATH, EP, DIAG/ABL, 3D/VECT: HCPCS

## 2017-06-07 PROCEDURE — 93010 ELECTROCARDIOGRAM REPORT: CPT | Performed by: INTERNAL MEDICINE

## 2017-06-07 PROCEDURE — 27210796 ZZH PAD EXTRNAL REFRENCE CARDIAC MAPPING CR14

## 2017-06-07 PROCEDURE — 93653 COMPRE EP EVAL TX SVT: CPT

## 2017-06-07 PROCEDURE — 36415 COLL VENOUS BLD VENIPUNCTURE: CPT | Performed by: INTERNAL MEDICINE

## 2017-06-07 PROCEDURE — 02563ZZ DESTRUCTION OF RIGHT ATRIUM, PERCUTANEOUS APPROACH: ICD-10-PCS | Performed by: INTERNAL MEDICINE

## 2017-06-07 PROCEDURE — 99153 MOD SED SAME PHYS/QHP EA: CPT

## 2017-06-07 PROCEDURE — 80048 BASIC METABOLIC PNL TOTAL CA: CPT | Performed by: INTERNAL MEDICINE

## 2017-06-07 PROCEDURE — 25000128 H RX IP 250 OP 636: Performed by: INTERNAL MEDICINE

## 2017-06-07 PROCEDURE — 82962 GLUCOSE BLOOD TEST: CPT

## 2017-06-07 PROCEDURE — 27210995 ZZH RX 272: Performed by: INTERNAL MEDICINE

## 2017-06-07 PROCEDURE — 02K83ZZ MAP CONDUCTION MECHANISM, PERCUTANEOUS APPROACH: ICD-10-PCS | Performed by: INTERNAL MEDICINE

## 2017-06-07 PROCEDURE — 40000852 ZZH STATISTIC HEART CATH LAB OR EP LAB

## 2017-06-07 PROCEDURE — 93613 INTRACARDIAC EPHYS 3D MAPG: CPT | Performed by: INTERNAL MEDICINE

## 2017-06-07 PROCEDURE — 93621 COMP EP EVL L PAC&REC C SINS: CPT | Mod: 26 | Performed by: INTERNAL MEDICINE

## 2017-06-07 PROCEDURE — 27210782 ZZH KIT EP TOTES DISP CR7

## 2017-06-07 PROCEDURE — C1730 CATH, EP, 19 OR FEW ELECT: HCPCS

## 2017-06-07 PROCEDURE — 99152 MOD SED SAME PHYS/QHP 5/>YRS: CPT | Performed by: INTERNAL MEDICINE

## 2017-06-07 PROCEDURE — 4A0234Z MEASUREMENT OF CARDIAC ELECTRICAL ACTIVITY, PERCUTANEOUS APPROACH: ICD-10-PCS | Performed by: INTERNAL MEDICINE

## 2017-06-07 PROCEDURE — 93613 INTRACARDIAC EPHYS 3D MAPG: CPT

## 2017-06-07 PROCEDURE — 85610 PROTHROMBIN TIME: CPT | Mod: QW | Performed by: INTERNAL MEDICINE

## 2017-06-07 PROCEDURE — 93653 COMPRE EP EVAL TX SVT: CPT | Performed by: INTERNAL MEDICINE

## 2017-06-07 PROCEDURE — 99153 MOD SED SAME PHYS/QHP EA: CPT | Performed by: INTERNAL MEDICINE

## 2017-06-07 PROCEDURE — 40000065 ZZH STATISTIC EKG NON-CHARGEABLE

## 2017-06-07 PROCEDURE — 27210795 ZZH PAD DEFIB QUICK CR4

## 2017-06-07 PROCEDURE — 40000857 ZZH STATISTIC TEE INCLUDES SEDATION

## 2017-06-07 PROCEDURE — 93621 COMP EP EVL L PAC&REC C SINS: CPT

## 2017-06-07 PROCEDURE — 93005 ELECTROCARDIOGRAM TRACING: CPT

## 2017-06-07 PROCEDURE — 99152 MOD SED SAME PHYS/QHP 5/>YRS: CPT

## 2017-06-07 PROCEDURE — 25000125 ZZHC RX 250: Performed by: INTERNAL MEDICINE

## 2017-06-07 RX ORDER — FENTANYL CITRATE 50 UG/ML
25 INJECTION, SOLUTION INTRAMUSCULAR; INTRAVENOUS
Status: DISCONTINUED | OUTPATIENT
Start: 2017-06-07 | End: 2017-06-07 | Stop reason: HOSPADM

## 2017-06-07 RX ORDER — GLYCOPYRROLATE 0.2 MG/ML
0.1 INJECTION, SOLUTION INTRAMUSCULAR; INTRAVENOUS ONCE
Status: DISCONTINUED | OUTPATIENT
Start: 2017-06-07 | End: 2017-06-07 | Stop reason: HOSPADM

## 2017-06-07 RX ORDER — SODIUM CHLORIDE 9 MG/ML
INJECTION, SOLUTION INTRAVENOUS CONTINUOUS PRN
Status: DISCONTINUED | OUTPATIENT
Start: 2017-06-07 | End: 2017-06-07 | Stop reason: HOSPADM

## 2017-06-07 RX ORDER — LIDOCAINE HYDROCHLORIDE AND EPINEPHRINE 10; 10 MG/ML; UG/ML
10-30 INJECTION, SOLUTION INFILTRATION; PERINEURAL
Status: DISCONTINUED | OUTPATIENT
Start: 2017-06-07 | End: 2017-06-07 | Stop reason: HOSPADM

## 2017-06-07 RX ORDER — PROMETHAZINE HYDROCHLORIDE 25 MG/ML
6.25-25 INJECTION, SOLUTION INTRAMUSCULAR; INTRAVENOUS EVERY 4 HOURS PRN
Status: DISCONTINUED | OUTPATIENT
Start: 2017-06-07 | End: 2017-06-07 | Stop reason: HOSPADM

## 2017-06-07 RX ORDER — FENTANYL CITRATE 50 UG/ML
25-50 INJECTION, SOLUTION INTRAMUSCULAR; INTRAVENOUS
Status: DISCONTINUED | OUTPATIENT
Start: 2017-06-07 | End: 2017-06-07 | Stop reason: HOSPADM

## 2017-06-07 RX ORDER — NALOXONE HYDROCHLORIDE 0.4 MG/ML
.1-.4 INJECTION, SOLUTION INTRAMUSCULAR; INTRAVENOUS; SUBCUTANEOUS
Status: DISCONTINUED | OUTPATIENT
Start: 2017-06-07 | End: 2017-06-07 | Stop reason: HOSPADM

## 2017-06-07 RX ORDER — IBUTILIDE FUMARATE 1 MG/10ML
1 INJECTION, SOLUTION INTRAVENOUS
Status: DISCONTINUED | OUTPATIENT
Start: 2017-06-07 | End: 2017-06-07 | Stop reason: HOSPADM

## 2017-06-07 RX ORDER — NALOXONE HYDROCHLORIDE 0.4 MG/ML
0.4 INJECTION, SOLUTION INTRAMUSCULAR; INTRAVENOUS; SUBCUTANEOUS EVERY 5 MIN PRN
Status: DISCONTINUED | OUTPATIENT
Start: 2017-06-07 | End: 2017-06-07 | Stop reason: HOSPADM

## 2017-06-07 RX ORDER — FLUMAZENIL 0.1 MG/ML
0.2 INJECTION, SOLUTION INTRAVENOUS
Status: DISCONTINUED | OUTPATIENT
Start: 2017-06-07 | End: 2017-06-07 | Stop reason: HOSPADM

## 2017-06-07 RX ORDER — IBUTILIDE FUMARATE 1 MG/10ML
0.01 INJECTION, SOLUTION INTRAVENOUS
Status: DISCONTINUED | OUTPATIENT
Start: 2017-06-07 | End: 2017-06-07 | Stop reason: HOSPADM

## 2017-06-07 RX ORDER — LIDOCAINE HYDROCHLORIDE 10 MG/ML
10-30 INJECTION, SOLUTION EPIDURAL; INFILTRATION; INTRACAUDAL; PERINEURAL
Status: COMPLETED | OUTPATIENT
Start: 2017-06-07 | End: 2017-06-07

## 2017-06-07 RX ORDER — HEPARIN SODIUM 1000 [USP'U]/ML
1000-10000 INJECTION, SOLUTION INTRAVENOUS; SUBCUTANEOUS EVERY 5 MIN PRN
Status: DISCONTINUED | OUTPATIENT
Start: 2017-06-07 | End: 2017-06-07 | Stop reason: HOSPADM

## 2017-06-07 RX ORDER — PROTAMINE SULFATE 10 MG/ML
1-5 INJECTION, SOLUTION INTRAVENOUS
Status: DISCONTINUED | OUTPATIENT
Start: 2017-06-07 | End: 2017-06-07 | Stop reason: HOSPADM

## 2017-06-07 RX ORDER — KETOROLAC TROMETHAMINE 30 MG/ML
15 INJECTION, SOLUTION INTRAMUSCULAR; INTRAVENOUS
Status: DISCONTINUED | OUTPATIENT
Start: 2017-06-07 | End: 2017-06-07 | Stop reason: HOSPADM

## 2017-06-07 RX ORDER — LIDOCAINE HYDROCHLORIDE 10 MG/ML
10-30 INJECTION, SOLUTION EPIDURAL; INFILTRATION; INTRACAUDAL; PERINEURAL
Status: DISCONTINUED | OUTPATIENT
Start: 2017-06-07 | End: 2017-06-07 | Stop reason: HOSPADM

## 2017-06-07 RX ORDER — LIDOCAINE 40 MG/G
CREAM TOPICAL
Status: DISCONTINUED | OUTPATIENT
Start: 2017-06-07 | End: 2017-06-07 | Stop reason: HOSPADM

## 2017-06-07 RX ORDER — LORAZEPAM 2 MG/ML
.5-2 INJECTION INTRAMUSCULAR EVERY 10 MIN PRN
Status: DISCONTINUED | OUTPATIENT
Start: 2017-06-07 | End: 2017-06-07 | Stop reason: HOSPADM

## 2017-06-07 RX ORDER — FUROSEMIDE 10 MG/ML
20-100 INJECTION INTRAMUSCULAR; INTRAVENOUS
Status: DISCONTINUED | OUTPATIENT
Start: 2017-06-07 | End: 2017-06-07 | Stop reason: HOSPADM

## 2017-06-07 RX ORDER — LIDOCAINE HYDROCHLORIDE 40 MG/ML
1.5 SOLUTION TOPICAL ONCE
Status: DISCONTINUED | OUTPATIENT
Start: 2017-06-07 | End: 2017-06-07 | Stop reason: HOSPADM

## 2017-06-07 RX ORDER — ADENOSINE 3 MG/ML
6-12 INJECTION, SOLUTION INTRAVENOUS EVERY 5 MIN PRN
Status: DISCONTINUED | OUTPATIENT
Start: 2017-06-07 | End: 2017-06-07 | Stop reason: HOSPADM

## 2017-06-07 RX ORDER — BUPIVACAINE HYDROCHLORIDE 2.5 MG/ML
10-30 INJECTION, SOLUTION EPIDURAL; INFILTRATION; INTRACAUDAL
Status: DISCONTINUED | OUTPATIENT
Start: 2017-06-07 | End: 2017-06-07 | Stop reason: HOSPADM

## 2017-06-07 RX ORDER — DIPHENHYDRAMINE HYDROCHLORIDE 50 MG/ML
25-50 INJECTION INTRAMUSCULAR; INTRAVENOUS
Status: DISCONTINUED | OUTPATIENT
Start: 2017-06-07 | End: 2017-06-07 | Stop reason: HOSPADM

## 2017-06-07 RX ORDER — PROTAMINE SULFATE 10 MG/ML
5-40 INJECTION, SOLUTION INTRAVENOUS EVERY 10 MIN PRN
Status: DISCONTINUED | OUTPATIENT
Start: 2017-06-07 | End: 2017-06-07 | Stop reason: HOSPADM

## 2017-06-07 RX ORDER — MORPHINE SULFATE 2 MG/ML
1-2 INJECTION, SOLUTION INTRAMUSCULAR; INTRAVENOUS EVERY 5 MIN PRN
Status: DISCONTINUED | OUTPATIENT
Start: 2017-06-07 | End: 2017-06-07 | Stop reason: HOSPADM

## 2017-06-07 RX ORDER — ATROPINE SULFATE 0.1 MG/ML
.5-1 INJECTION INTRAVENOUS
Status: DISCONTINUED | OUTPATIENT
Start: 2017-06-07 | End: 2017-06-07 | Stop reason: HOSPADM

## 2017-06-07 RX ORDER — SODIUM CHLORIDE 450 MG/100ML
INJECTION, SOLUTION INTRAVENOUS CONTINUOUS
Status: DISCONTINUED | OUTPATIENT
Start: 2017-06-07 | End: 2017-06-07 | Stop reason: HOSPADM

## 2017-06-07 RX ORDER — ONDANSETRON 2 MG/ML
4 INJECTION INTRAMUSCULAR; INTRAVENOUS EVERY 4 HOURS PRN
Status: DISCONTINUED | OUTPATIENT
Start: 2017-06-07 | End: 2017-06-07 | Stop reason: HOSPADM

## 2017-06-07 RX ORDER — DOBUTAMINE HYDROCHLORIDE 200 MG/100ML
5-40 INJECTION INTRAVENOUS CONTINUOUS PRN
Status: DISCONTINUED | OUTPATIENT
Start: 2017-06-07 | End: 2017-06-07 | Stop reason: HOSPADM

## 2017-06-07 RX ADMIN — FENTANYL CITRATE 50 MCG: 50 INJECTION, SOLUTION INTRAMUSCULAR; INTRAVENOUS at 10:12

## 2017-06-07 RX ADMIN — MIDAZOLAM HYDROCHLORIDE 1 MG: 1 INJECTION, SOLUTION INTRAMUSCULAR; INTRAVENOUS at 10:21

## 2017-06-07 RX ADMIN — SODIUM CHLORIDE: 4.5 INJECTION, SOLUTION INTRAVENOUS at 07:39

## 2017-06-07 RX ADMIN — MIDAZOLAM HYDROCHLORIDE 0.5 MG: 1 INJECTION, SOLUTION INTRAMUSCULAR; INTRAVENOUS at 10:25

## 2017-06-07 RX ADMIN — FENTANYL CITRATE 25 MCG: 50 INJECTION, SOLUTION INTRAMUSCULAR; INTRAVENOUS at 10:25

## 2017-06-07 RX ADMIN — LIDOCAINE HYDROCHLORIDE 200 MG: 10 INJECTION, SOLUTION EPIDURAL; INFILTRATION; INTRACAUDAL; PERINEURAL at 10:13

## 2017-06-07 NOTE — PROGRESS NOTES
Clinic Care Coordination Contact    Situation: Patient chart reviewed by care coordinator.    Background: Received a CTS from [facility] that pt was discharged 6/6/17.     Assessment: Pt is currently at [facility] for BRANDEN and Ablation.     Plan/Recommendations: will outreach to pt in one business day.     Meenakshi Larsen RN,BSN  Clinical Care Coordinator    [facility] [phone]  [facility] [phone]

## 2017-06-07 NOTE — IP AVS SNAPSHOT
[place] [address]    [place] [address]    Phone:  [phone]                                       After Visit Summary   6/7/2017    Kathryn Banks    MRN: [id number]           After Visit Summary Signature Page     I have received my discharge instructions, and my questions have been answered. I have discussed any challenges I see with this plan with the nurse or doctor.    ..........................................................................................................................................  Patient/Patient Representative Signature      ..........................................................................................................................................  Patient Representative Print Name and Relationship to Patient    ..................................................               ................................................  Date                                            Time    ..........................................................................................................................................  Reviewed by Signature/Title    ...................................................              ..............................................  Date                                                            Time

## 2017-06-07 NOTE — PROGRESS NOTES
Pt returned from procedure at 1050, very sedated but arousable. Tele NSR. VSS, BLE CMS intact with pulses present. L subclavian and R groin sites soft, intact.

## 2017-06-07 NOTE — DISCHARGE INSTRUCTIONS
Atrial Flutter Ablation Discharge Instructions - Femoral     After you go home:      Have an adult stay with you until tomorrow.    You may resume your normal diet.       For 24 hours - due to the sedation you received:    Relax and take it easy.    Do NOT make any important or legal decisions.    Do NOT drive or operate machines at home or at work.    Do NOT drink alcohol.    Care of Groin Puncture Site:      For the first 24 hrs - check the puncture site every 1-2 hours while awake.    For 2 days, when you cough, sneeze, laugh or move your bowels, hold your hand over the puncture site and press firmly.    Remove the bandaid after 24 hours. If there is minor oozing, apply another bandaid and remove it after 12 hours.    It is normal to have a small bruise or pea size lump at the site.    You may shower tomorrow.  Do NOT take a bath, or use a hot tub or pool for at least 3 days. Do NOT scrub the site. Do not use lotion or powder near the puncture site.    Activity:            For 2 days:    No stooping or squatting    Do NOT do any heavy activity such as exercise, lifting, or straining.     No housework, yard work or any activity that make you sweat    Do NOT lift more than 10 pounds    Bleeding:      If you start bleeding from the site in your groin, lie down flat and press firmly on the site for 10 minutes.     Once bleeding stops, lay flat for 2 hours.    Call [facility] as soon as you can.       Call 911 right away if you have heavy bleeding or bleeding that does not stop.      Medicines:      Take your medications, including blood thinners, unless your provider tells you not to.    If you have stopped any other medicines, check with your provider about when to restart them.    If you have pain or shortness of breath, you may take Advil (ibuprofen) or Tylenol (acetaminophen).    Follow Up Appointments:      An appointment has been set up for you for follow-up care    You will receive a phone call tomorrow  morning from Delia PONCE or Audra PONCE.    Call the clinic if:      You have increased pain or a large or growing hard lump around the site.    The site is red, swollen, hot or tender.    Blood or fluid is draining from the site.    You have chills or a fever greater than 101 F (38 C).    Your leg feels numb, cool or changes color.    Increased pain in the chest and/or groin.    Increased shortness of breath    Chest pain not relieved by Tylenol or Advil    New pain in the back or belly that you cannot control with Tylenol.    Recurrent irregular or fast heart rate (AFlutter) lasting over 2 hours.    Any questions or concerns.    Heart rhythms:    You may have some irregular heartbeats. These feel very strong. They may make you feel that the fast heart rhythm is going to start again.  Give it time. The irregular beats should occur less often.       [facility]:    [phone] ( 8am-5pm M-F)  ROSARIO Lin or ROSARIO Addison    [phone] [facility] (7 days a week)

## 2017-06-07 NOTE — PROGRESS NOTES
Bedrest completed at 1445. BP soft but stable, Tele NSR. L subclavian and R groin sites soft and intact, no evidence of bleeding. Pt had incontinent loose BM, assisted with lenka cares and to bathroom. Up with A1 to WC. Tolerating PO and voided. Discharge instructions and medications reviewed with pt, spouse and niece Simin, questions answered. Pt and family deny concerns at this time. Plan to DC home via niece.

## 2017-06-07 NOTE — IP AVS SNAPSHOT
MRN:[id number]                      After Visit Summary   6/7/2017    Kathryn Banks    MRN: [id number]           Visit Information        Department      6/7/2017  6:55 AM [facility]          Review of your medicines      CONTINUE these medicines which may have CHANGED, or have new prescriptions. If we are uncertain of the size of tablets/capsules you have at home, strength may be listed as something that might have changed.        Dose / Directions    calcium carbonate-vitamin D 500-400 MG-UNIT Tabs per tablet   This may have changed:  when to take this   Used for:  Closed intertrochanteric fracture of left hip, initial encounter (H)        Dose:  1 tablet   Take 1 tablet by mouth 3 times daily   Quantity:  180 tablet   Refills:  3       furosemide 40 MG tablet   Commonly known as:  LASIX   This may have changed:    - how much to take  - when to take this  - additional instructions   Used for:  Lymphedema        40 mg a day   Quantity:  90 tablet   Refills:  3       isosorbide mononitrate 30 MG 24 hr tablet   Commonly known as:  IMDUR   This may have changed:  how much to take   Used for:  Hx of non-ST elevation myocardial infarction (NSTEMI)        Dose:  90 mg   Take 3 tablets (90 mg) by mouth daily   Quantity:  270 tablet   Refills:  3         CONTINUE these medicines which have NOT CHANGED        Dose / Directions    cyanocobalamin 1000 MCG/ML injection   Commonly known as:  VITAMIN B12   Used for:  Vitamin B12 deficiency (non anemic)        Dose:  1 mL   Inject 1 mL (1,000 mcg) into the muscle every 30 days   Quantity:  1 mL   Refills:  11       ferrous sulfate 325 (65 FE) MG tablet   Commonly known as:  IRON   Used for:  Anemia, unspecified type        Dose:  325 mg   Take 1 tablet (325 mg) by mouth daily (with breakfast)   Quantity:  100 tablet   Refills:  0       GABAPENTIN PO        Dose:  300 mg   Take 300 mg by mouth 2 times daily   Refills:  0       GLIPIZIDE XL  PO        Dose:  10 mg   Take 10 mg by mouth daily   Refills:  0       metoprolol 25 MG tablet   Commonly known as:  LOPRESSOR   Used for:  Atrial fibrillation with rapid ventricular response (H)        Dose:  25 mg   Take 1 tablet (25 mg) by mouth 2 times daily   Quantity:  60 tablet   Refills:  0       nitroglycerin 0.4 MG sublingual tablet   Commonly known as:  NITROSTAT   Used for:  Postsurgical aortocoronary bypass status        Dose:  0.4 mg   Place 1 tablet under the tongue See Admin Instructions. for chest pain   Quantity:  25 tablet   Refills:  0       order for DME   Used for:  ANABEL (obstructive sleep apnea)        Equipment being ordered: cpap machine, mask, humidifier, tubing and filters   Quantity:  1 Device   Refills:  0       pramipexole 1 MG tablet   Commonly known as:  MIRAPEX   Used for:  Restless legs syndrome (RLS), Hyperlipidemia LDL goal <100, Hypertension goal BP (blood pressure) < 140/90        TAKE 3 TABLETS BY MOUTH EVERY EVENING   Quantity:  270 tablet   Refills:  2       PRAVASTATIN SODIUM PO        Dose:  40 mg   Take 40 mg by mouth daily   Refills:  0       warfarin 2.5 MG tablet   Commonly known as:  COUMADIN   Used for:  Atrial fibrillation with rapid ventricular response (H), Long-term (current) use of anticoagulants        Take 1 tablet on 6/7 and 2 tablets on 6/8 and recheck INR on 6/9   Quantity:  10 tablet   Refills:  0         STOP taking     diltiazem 120 MG 24 hr capsule   Commonly known as:  CARDIZEM CD                    Protect others around you: Learn how to safely use, store and throw away your medicines at www.disposemymeds.org.         Follow-ups after your visit        Additional Services     Follow-Up with Electrophysiologist                 Your next 10 appointments already scheduled     Jun 12, 2017  3:15 PM CDT   [facility] Visit with D[facility]aj Jj MD   [facility]    [address] [place] [id number]    [phone]           Bring a current list of meds and any records pertaining to this visit.  For Physicals, please bring immunization records and any forms needing to be filled out.  Please arrive 10 minutes early to complete paperwork.            Jul 27, 2017 11:00 AM CDT   Return Visit with Thaddeus King MD   [facility]    [address] [id number] [phone]              Future tests that were ordered for you     EKG 12-lead complete w/read  (to be scheduled)                  Care Instructions        After Care Instructions     Discharge Instructions - Follow up with Device Check RN        Follow up with Device Check RN in 7-10 days.            Discharge Instructions - Keep incision dry for 72 hours       Keep incision dry for 72 hours (unless Derma Rose was applied)                  Further instructions from your care team       Atrial Flutter Ablation Discharge Instructions - Femoral     After you go home:      Have an adult stay with you until tomorrow.    You may resume your normal diet.       For 24 hours - due to the sedation you received:    Relax and take it easy.    Do NOT make any important or legal decisions.    Do NOT drive or operate machines at home or at work.    Do NOT drink alcohol.    Care of Groin Puncture Site:      For the first 24 hrs - check the puncture site every 1-2 hours while awake.    For 2 days, when you cough, sneeze, laugh or move your bowels, hold your hand over the puncture site and press firmly.    Remove the bandaid after 24 hours. If there is minor oozing, apply another bandaid and remove it after 12 hours.    It is normal to have a small bruise or pea size lump at the site.    You may shower tomorrow.  Do NOT take a bath, or use a hot tub or pool for at least 3 days. Do NOT scrub the site. Do not use lotion or powder near the puncture site.    Activity:            For 2 days:    No stooping or  squatting    Do NOT do any heavy activity such as exercise, lifting, or straining.     No housework, yard work or any activity that make you sweat    Do NOT lift more than 10 pounds    Bleeding:      If you start bleeding from the site in your groin, lie down flat and press firmly on the site for 10 minutes.     Once bleeding stops, lay flat for 2 hours.    Call [facility] as soon as you can.       Call 911 right away if you have heavy bleeding or bleeding that does not stop.      Medicines:      Take your medications, including blood thinners, unless your provider tells you not to.    If you have stopped any other medicines, check with your provider about when to restart them.    If you have pain or shortness of breath, you may take Advil (ibuprofen) or Tylenol (acetaminophen).    Follow Up Appointments:      An appointment has been set up for you for follow-up care    You will receive a phone call tomorrow morning from Delia PONCE or Audra PONCE.    Call the clinic if:      You have increased pain or a large or growing hard lump around the site.    The site is red, swollen, hot or tender.    Blood or fluid is draining from the site.    You have chills or a fever greater than 101 F (38 C).    Your leg feels numb, cool or changes color.    Increased pain in the chest and/or groin.    Increased shortness of breath    Chest pain not relieved by Tylenol or Advil    New pain in the back or belly that you cannot control with Tylenol.    Recurrent irregular or fast heart rate (AFlutter) lasting over 2 hours.    Any questions or concerns.    Heart rhythms:    You may have some irregular heartbeats. These feel very strong. They may make you feel that the fast heart rhythm is going to start again.  Give it time. The irregular beats should occur less often.       [facility]:    [phone] ( 8am-5pm M-F)  ROSARIO Lin or ROSARIO Addison    [phone] [facility] (7 days a week)               Additional  "Information About Your Visit        MyChart Information     [employer] lets you send messages to your doctor, view your test results, renew your prescriptions, schedule appointments and more. To sign up, go to [email or web address] . Click on \"Log in\" on the left side of the screen, which will take you to the Welcome page. Then click on \"Sign up Now\" on the right side of the page.     You will be asked to enter the access code listed below, as well as some personal information. Please follow the directions to create your username and password.     Your access code is: [email or web address]  Expires: 2017 11:18 AM     Your access code will  in 90 days. If you need help or a new code, please call your [facility] clinic or [phone].        Care EveryWhere ID     This is your Care EveryWhere ID. This could be used by other organizations to access your [facility] medical records  [id number]        Your Vitals Were     Blood Pressure Pulse Temperature Respirations Height Weight    101/54 74 97.8  F (36.6  C) (Oral) 14 1.626 m (5' 4\") 76.3 kg (168 lb 3.2 oz)    Pulse Oximetry BMI (Body Mass Index)                98% 28.87 kg/m2           Primary Care Provider Office Phone # Fax #    Dheeraj Jj [phone]      Thank you!     Thank you for choosing [facility] for your care. Our goal is always to provide you with excellent care. Hearing back from our patients is one way we can continue to improve our services. Please take a few minutes to complete the written survey that you may receive in the mail after you visit with us. Thank you!             Medication List: This is a list of all your medications and when to take them. Check marks below indicate your daily home schedule. Keep this list as a reference.      Medications           Morning Afternoon Evening Bedtime As Needed    calcium carbonate-vitamin D 500-400 MG-UNIT Tabs per tablet   Take 1 tablet by mouth 3 times daily                             "    cyanocobalamin 1000 MCG/ML injection   Commonly known as:  VITAMIN B12   Inject 1 mL (1,000 mcg) into the muscle every 30 days                                ferrous sulfate 325 (65 FE) MG tablet   Commonly known as:  IRON   Take 1 tablet (325 mg) by mouth daily (with breakfast)                                furosemide 40 MG tablet   Commonly known as:  LASIX   40 mg a day                                GABAPENTIN PO   Take 300 mg by mouth 2 times daily                                GLIPIZIDE XL PO   Take 10 mg by mouth daily                                isosorbide mononitrate 30 MG 24 hr tablet   Commonly known as:  IMDUR   Take 3 tablets (90 mg) by mouth daily                                metoprolol 25 MG tablet   Commonly known as:  LOPRESSOR   Take 1 tablet (25 mg) by mouth 2 times daily                                nitroglycerin 0.4 MG sublingual tablet   Commonly known as:  NITROSTAT   Place 1 tablet under the tongue See Admin Instructions. for chest pain                                order for DME   Equipment being ordered: cpap machine, mask, humidifier, tubing and filters                                pramipexole 1 MG tablet   Commonly known as:  MIRAPEX   TAKE 3 TABLETS BY MOUTH EVERY EVENING                                PRAVASTATIN SODIUM PO   Take 40 mg by mouth daily                                warfarin 2.5 MG tablet   Commonly known as:  COUMADIN   Take 1 tablet on 6/7 and 2 tablets on 6/8 and recheck INR on 6/9

## 2017-06-07 NOTE — PROGRESS NOTES
Tricuspid isthmus ablation for well documented typical atrial flutter.  No complications.  May be discharged around 3 PM.  Resume warfarin and metoprolol. Stop diltiazem.  See me in 1-2 months.

## 2017-06-07 NOTE — PROGRESS NOTES
Pt admitted to [facility] for scheduled BRANDEN and A-flutter Ablation. Dr. King  was here to consent pt and he cancelled BRANDEN. Plan of care explained and questions answered. Pt appears calm and sleepy.  and daughter here. Very pleasant, cooperative.

## 2017-06-08 NOTE — PROGRESS NOTES
Clinic Care Coordination Contact  [facility]/Voicemail    Referral Source: CTS  Clinical Data: Care Coordinator Outreach  Outreach attempted x 1.  busy  Plan: . Care Coordinator will try to reach patient again in 1-2 business days.      Meenakshi Larsen RN,BSN  Clinical Care Coordinator    [facility] [phone]  [facility] [phone]

## 2017-06-09 ENCOUNTER — TELEPHONE (OUTPATIENT)
Dept: INTERNAL MEDICINE | Facility: CLINIC | Age: 77
End: 2017-06-09

## 2017-06-09 ENCOUNTER — ANTICOAGULATION THERAPY VISIT (OUTPATIENT)
Dept: ANTICOAGULATION | Facility: CLINIC | Age: 77
End: 2017-06-09

## 2017-06-09 ENCOUNTER — TELEPHONE (OUTPATIENT)
Dept: FAMILY MEDICINE | Facility: CLINIC | Age: 77
End: 2017-06-09

## 2017-06-09 DIAGNOSIS — Z79.01 LONG-TERM (CURRENT) USE OF ANTICOAGULANTS: ICD-10-CM

## 2017-06-09 LAB — INR PPP: 3.1

## 2017-06-09 NOTE — TELEPHONE ENCOUNTER
She should take an extra 40 mg of lasix, so 80 in the AM and 80 in the PM today and then tomorrow back to 80 am and 40 PM.

## 2017-06-09 NOTE — PROGRESS NOTES
"Kathryn Banks  Gender: female  : 1940  [address] [place] [id number] [phone] (home)     Medical Record: [id number]  Pharmacy:    [facility] [place] [address]  [facility] [place], [place] [address] [facility] [place] [address]  [facility] [place], [place] - [address]  [facility] [place], [place] - [address]  [facility] - [place], [place] - [address]  Primary Care Provider: Dheeraj Jj    Parent's names are: Data Unavailable (mother) and Data Unavailable (father).      [facility]  2017     Discharge Phone Call:  Key Words/Key Times      Introduction - AIDET (Acknowledge, Introduce, Duration, Explanation)      Empathy-   We are calling to see how you are since your recent stay in the hospital?     Call back COMMENTS: Patient states they are, \"doing good\".       Clinical Questions -  (f/u appts, medication side effects/purpose, ability to care for self at home) \"For your safety, it is important to us that you understand the purpose and side effects of your medications, can you tell me what your new medications are?\"     Call back COMMENTS: patient does not have any new medications and has a HHN coming in twice a week.        Staff Recognition -  We like to recognize staff and physicians who have done an excellent job.  Do you remember any people from your care team that you would like recognize?     Call back COMMENTS: Elizabeth Muir Tiffany, Allysa, and Aurora are recognized for doing an outstanding job.       Very Good Care -  We want to provide very good care to all patients.  How was your care?     Call back COMMENTS: everyone was very nice and the care was excellent.       Opportunities for Improvement -  Our goal is to be the best.  Do you have any suggestions for things that " we could improve upon?     Call back COMMENTS: Nothing      Thank You

## 2017-06-09 NOTE — MR AVS SNAPSHOT
Kathryn MICKY Banks   6/9/2017   Anticoagulation Therapy Visit    Description:  77 year old female   Provider:  Dheeraj Jj MD   Department:  Ph Anticoag           INR as of 6/9/2017     Today's INR 3.1!      Anticoagulation Summary as of 6/9/2017     INR goal 2.0-3.0   Today's INR 3.1!   Full instructions 6/9: 2.5 mg; 6/11: 2.5 mg; Otherwise 7.5 mg on Fri; 5 mg all other days   Next INR check 6/12/2017    Indications   Long-term (current) use of anticoagulants [Z79.01] [Z79.01]  Atrial fibrillation (H) (Resolved) [I48.91]  Paroxysmal atrial fibrillation (H) (Resolved) [I48.0]         Description     *unknown what was taken 6/2-6/5 as pt was admitted        Contact Numbers     Clinic Number:         June 2017 Details    Sun Mon Tue Wed Thu Fri Sat         1               2               3                 4               5               6               7               8               9      2.5 mg   See details      10      5 mg           11      2.5 mg         12            13               14               15               16               17                 18               19               20               21               22               23               24                 25               26               27               28               29               30                 Date Details   06/09 This INR check       Date of next INR:  6/12/2017         How to take your warfarin dose     To take:  2.5 mg Take 1 of the 2.5 mg tablets.    To take:  5 mg Take 2 of the 2.5 mg tablets.

## 2017-06-09 NOTE — TELEPHONE ENCOUNTER
Home care nurse Leona called back. Please call her at your earliest convenience at [phone].    Thanks  Judson Erazo

## 2017-06-09 NOTE — TELEPHONE ENCOUNTER
Home care nurse Leona called back. Please call her asap.       Thank you,   Marli Belle   for [facility]

## 2017-06-09 NOTE — TELEPHONE ENCOUNTER
Reason for Call:  Other call back    Detailed comments: Jon from home care states patient had a weight gain of four pounds overnight, please advise    Phone Number Patient can be reached at: Other phone number:  [phone]    Best Time:     Can we leave a detailed message on this number? YES    Call taken on 6/9/2017 at 2:36 PM by Josephine Tejeda

## 2017-06-09 NOTE — TELEPHONE ENCOUNTER
left for Leona [facility] nurse. Asked that she call back to let us know what pt has taken for coumadin since Friday 6/2 and if she is on new meds  Zo Davis RN

## 2017-06-09 NOTE — PROGRESS NOTES
ANTICOAGULATION FOLLOW-UP CLINIC VISIT    Patient Name:  Kathryn Banks  Date:  6/9/2017  Contact Type:  Telephone/ VELASQUEZ Delgadillo nurse    SUBJECTIVE:     Patient Findings     Positives Med error (unknown what pt recieved while admitted. Per Leona, pt did recieve 2.5 mg Wed 6/7 and 5 mg 6/8)           OBJECTIVE    INR   Date Value Ref Range Status   06/09/2017 3.1  Final       ASSESSMENT / PLAN  INR assessment THER    Recheck INR In: 3 DAYS    INR Location Outside lab      Anticoagulation Summary as of 6/9/2017     INR goal 2.0-3.0   Today's INR 3.1!   Maintenance plan 7.5 mg (2.5 mg x 3) on Fri; 5 mg (2.5 mg x 2) all other days   Full instructions 6/9: 2.5 mg; 6/11: 2.5 mg; Otherwise 7.5 mg on Fri; 5 mg all other days   Weekly total 37.5 mg   Plan last modified Nicki Calvillo RN (4/11/2017)   Next INR check 6/12/2017   Target end date     Indications   Long-term (current) use of anticoagulants [Z79.01] [Z79.01]  Atrial fibrillation (H) (Resolved) [I48.91]  Paroxysmal atrial fibrillation (H) (Resolved) [I48.0]         Anticoagulation Episode Summary     INR check location     Preferred lab     Send INR reminders to [facility]ANDI    Comments 2.5 mg tablets, pm dose. Alere home monitor      Anticoagulation Care Providers     Provider Role Specialty Phone number    Dheeraj Jj MD [facility] Internal Medicine [phone]            See the Encounter Report to view Anticoagulation Flowsheet and Dosing Calendar (Go to Encounters tab in chart review, and find the Anticoagulation Therapy Visit)    Dosage adjustment made based on physician directed care plan.      Zo Norwood, RN

## 2017-06-09 NOTE — TELEPHONE ENCOUNTER
Reason for Call:  INR    Who is calling?  Home Care: [facility]    Phone number:      Fax number:      Name of caller:     INR Value:  3.1    Are there any other concerns:  No    Can we leave a detailed message on this number? YES    Phone number patient can be reached at: Other phone number:        Call taken on 6/9/2017 at 2:58 PM by Megan Hernandez

## 2017-06-10 LAB — INTERPRETATION ECG - MUSE: NORMAL

## 2017-06-11 ENCOUNTER — NURSE TRIAGE (OUTPATIENT)
Dept: NURSING | Facility: CLINIC | Age: 77
End: 2017-06-11

## 2017-06-11 ENCOUNTER — TELEPHONE (OUTPATIENT)
Dept: NURSING | Facility: CLINIC | Age: 77
End: 2017-06-11

## 2017-06-11 NOTE — TELEPHONE ENCOUNTER
RN states she's giving an up date.  Kathryn's weight is up 3 lbs. Was previously hospitalized for this, CHF. O2 sats 98% no shortness of breath, minimal edema in legs.  Blood pressure is 132/80. Advised to call back if anything worsens, changes or something else develops.  Trista Will RN-[facility]

## 2017-06-12 ENCOUNTER — APPOINTMENT (OUTPATIENT)
Dept: GENERAL RADIOLOGY | Facility: CLINIC | Age: 77
DRG: 683 | End: 2017-06-12
Attending: FAMILY MEDICINE
Payer: MEDICARE

## 2017-06-12 ENCOUNTER — TELEPHONE (OUTPATIENT)
Dept: INTERNAL MEDICINE | Facility: CLINIC | Age: 77
End: 2017-06-12

## 2017-06-12 ENCOUNTER — RESULTS ONLY (OUTPATIENT)
Dept: EMERGENCY MEDICINE | Facility: CLINIC | Age: 77
End: 2017-06-12

## 2017-06-12 ENCOUNTER — HOSPITAL ENCOUNTER (INPATIENT)
Facility: CLINIC | Age: 77
LOS: 4 days | Discharge: NURSING, REHAB OR LONG-TERM CARE | DRG: 683 | End: 2017-06-16
Attending: FAMILY MEDICINE | Admitting: INTERNAL MEDICINE
Payer: MEDICARE

## 2017-06-12 DIAGNOSIS — D50.8 IRON DEFICIENCY ANEMIA SECONDARY TO INADEQUATE DIETARY IRON INTAKE: ICD-10-CM

## 2017-06-12 DIAGNOSIS — D64.9 ANEMIA, UNSPECIFIED TYPE: ICD-10-CM

## 2017-06-12 DIAGNOSIS — I10 HYPERTENSION GOAL BP (BLOOD PRESSURE) < 140/90: ICD-10-CM

## 2017-06-12 DIAGNOSIS — I25.10 ATHEROSCLEROSIS OF NATIVE CORONARY ARTERY OF NATIVE HEART WITHOUT ANGINA PECTORIS: Primary | ICD-10-CM

## 2017-06-12 DIAGNOSIS — Z95.1 POSTSURGICAL AORTOCORONARY BYPASS STATUS: ICD-10-CM

## 2017-06-12 DIAGNOSIS — E11.42 DIABETIC POLYNEUROPATHY ASSOCIATED WITH TYPE 2 DIABETES MELLITUS (H): ICD-10-CM

## 2017-06-12 DIAGNOSIS — E78.5 HYPERLIPIDEMIA LDL GOAL <100: ICD-10-CM

## 2017-06-12 DIAGNOSIS — N18.9 ACUTE ON CHRONIC RENAL FAILURE (H): ICD-10-CM

## 2017-06-12 DIAGNOSIS — Z79.01 LONG-TERM (CURRENT) USE OF ANTICOAGULANTS: ICD-10-CM

## 2017-06-12 DIAGNOSIS — I48.91 ATRIAL FIBRILLATION WITH RAPID VENTRICULAR RESPONSE (H): ICD-10-CM

## 2017-06-12 DIAGNOSIS — N17.9 ACUTE ON CHRONIC RENAL FAILURE (H): ICD-10-CM

## 2017-06-12 DIAGNOSIS — I25.2 HX OF NON-ST ELEVATION MYOCARDIAL INFARCTION (NSTEMI): ICD-10-CM

## 2017-06-12 DIAGNOSIS — G25.81 RESTLESS LEGS SYNDROME (RLS): ICD-10-CM

## 2017-06-12 DIAGNOSIS — E53.8 VITAMIN B12 DEFICIENCY (NON ANEMIC): ICD-10-CM

## 2017-06-12 DIAGNOSIS — S72.142A CLOSED INTERTROCHANTERIC FRACTURE OF LEFT HIP, INITIAL ENCOUNTER (H): ICD-10-CM

## 2017-06-12 PROBLEM — R41.3 MEMORY LOSS: Status: ACTIVE | Noted: 2017-06-12

## 2017-06-12 PROBLEM — I48.20 CHRONIC ATRIAL FIBRILLATION (H): Status: ACTIVE | Noted: 2017-06-12

## 2017-06-12 LAB
ALBUMIN SERPL-MCNC: 3.1 G/DL (ref 3.4–5)
ALBUMIN UR-MCNC: NEGATIVE MG/DL
ALP SERPL-CCNC: 177 U/L (ref 40–150)
ALT SERPL W P-5'-P-CCNC: 47 U/L (ref 0–50)
ANION GAP SERPL CALCULATED.3IONS-SCNC: 9 MMOL/L (ref 3–14)
APPEARANCE UR: CLEAR
AST SERPL W P-5'-P-CCNC: 42 U/L (ref 0–45)
BACTERIA #/AREA URNS HPF: ABNORMAL /HPF
BASOPHILS # BLD AUTO: 0.1 10E9/L (ref 0–0.2)
BASOPHILS NFR BLD AUTO: 0.8 %
BILIRUB DIRECT SERPL-MCNC: 0.1 MG/DL (ref 0–0.2)
BILIRUB SERPL-MCNC: 0.3 MG/DL (ref 0.2–1.3)
BILIRUB UR QL STRIP: NEGATIVE
BUN SERPL-MCNC: 60 MG/DL (ref 7–30)
CALCIUM SERPL-MCNC: 7.6 MG/DL (ref 8.5–10.1)
CHLORIDE SERPL-SCNC: 114 MMOL/L (ref 94–109)
CO2 SERPL-SCNC: 24 MMOL/L (ref 20–32)
COLOR UR AUTO: COLORLESS
CREAT SERPL-MCNC: 2.49 MG/DL (ref 0.52–1.04)
CREAT SERPL-MCNC: 2.61 MG/DL (ref 0.52–1.04)
CREAT UR-MCNC: 14 MG/DL
DIFFERENTIAL METHOD BLD: ABNORMAL
EOSINOPHIL # BLD AUTO: 0.3 10E9/L (ref 0–0.7)
EOSINOPHIL NFR BLD AUTO: 3.6 %
ERYTHROCYTE [DISTWIDTH] IN BLOOD BY AUTOMATED COUNT: 16.8 % (ref 10–15)
FRACT EXCRET NA UR+SERPL-RTO: 14.3 %
GFR SERPL CREATININE-BSD FRML MDRD: 19 ML/MIN/1.7M2
GLUCOSE BLDC GLUCOMTR-MCNC: 133 MG/DL (ref 70–99)
GLUCOSE SERPL-MCNC: 131 MG/DL (ref 70–99)
GLUCOSE UR STRIP-MCNC: NEGATIVE MG/DL
HCT VFR BLD AUTO: 30.1 % (ref 35–47)
HGB BLD-MCNC: 8.7 G/DL (ref 11.7–15.7)
HGB UR QL STRIP: ABNORMAL
IMM GRANULOCYTES # BLD: 0 10E9/L (ref 0–0.4)
IMM GRANULOCYTES NFR BLD: 0.2 %
INR PPP: 2.44 (ref 0.86–1.14)
KETONES UR STRIP-MCNC: NEGATIVE MG/DL
LEUKOCYTE ESTERASE UR QL STRIP: NEGATIVE
LYMPHOCYTES # BLD AUTO: 1.1 10E9/L (ref 0.8–5.3)
LYMPHOCYTES NFR BLD AUTO: 13.2 %
MCH RBC QN AUTO: 23.4 PG (ref 26.5–33)
MCHC RBC AUTO-ENTMCNC: 28.9 G/DL (ref 31.5–36.5)
MCV RBC AUTO: 81 FL (ref 78–100)
MONOCYTES # BLD AUTO: 0.6 10E9/L (ref 0–1.3)
MONOCYTES NFR BLD AUTO: 6.7 %
MUCOUS THREADS #/AREA URNS LPF: PRESENT /LPF
NEUTROPHILS # BLD AUTO: 6.2 10E9/L (ref 1.6–8.3)
NEUTROPHILS NFR BLD AUTO: 75.5 %
NITRATE UR QL: NEGATIVE
NT-PROBNP SERPL-MCNC: ABNORMAL PG/ML (ref 0–1800)
PH UR STRIP: 5 PH (ref 5–7)
PLATELET # BLD AUTO: 253 10E9/L (ref 150–450)
POTASSIUM SERPL-SCNC: 4.2 MMOL/L (ref 3.4–5.3)
PROT SERPL-MCNC: 6.1 G/DL (ref 6.8–8.8)
RBC # BLD AUTO: 3.72 10E12/L (ref 3.8–5.2)
RBC #/AREA URNS AUTO: 26 /HPF (ref 0–2)
SODIUM SERPL-SCNC: 147 MMOL/L (ref 133–144)
SODIUM SERPL-SCNC: 148 MMOL/L (ref 133–144)
SODIUM UR-SCNC: 115 MMOL/L
SP GR UR STRIP: 1 (ref 1–1.03)
SQUAMOUS #/AREA URNS AUTO: 1 /HPF (ref 0–1)
URN SPEC COLLECT METH UR: ABNORMAL
UROBILINOGEN UR STRIP-MCNC: 0 MG/DL (ref 0–2)
WBC # BLD AUTO: 8.3 10E9/L (ref 4–11)
WBC #/AREA URNS AUTO: 3 /HPF (ref 0–2)

## 2017-06-12 PROCEDURE — 93010 ELECTROCARDIOGRAM REPORT: CPT | Mod: Z6 | Performed by: FAMILY MEDICINE

## 2017-06-12 PROCEDURE — 82570 ASSAY OF URINE CREATININE: CPT | Performed by: INTERNAL MEDICINE

## 2017-06-12 PROCEDURE — 80048 BASIC METABOLIC PNL TOTAL CA: CPT | Performed by: FAMILY MEDICINE

## 2017-06-12 PROCEDURE — 93005 ELECTROCARDIOGRAM TRACING: CPT

## 2017-06-12 PROCEDURE — 12000007 ZZH R&B INTERMEDIATE

## 2017-06-12 PROCEDURE — 71010 XR CHEST PORT 1 VW: CPT | Mod: TC

## 2017-06-12 PROCEDURE — 81001 URINALYSIS AUTO W/SCOPE: CPT | Performed by: INTERNAL MEDICINE

## 2017-06-12 PROCEDURE — 84295 ASSAY OF SERUM SODIUM: CPT | Performed by: INTERNAL MEDICINE

## 2017-06-12 PROCEDURE — 87081 CULTURE SCREEN ONLY: CPT | Performed by: PEDIATRICS

## 2017-06-12 PROCEDURE — A9270 NON-COVERED ITEM OR SERVICE: HCPCS | Mod: GY | Performed by: PEDIATRICS

## 2017-06-12 PROCEDURE — 25000132 ZZH RX MED GY IP 250 OP 250 PS 637: Mod: GY | Performed by: INTERNAL MEDICINE

## 2017-06-12 PROCEDURE — 99223 1ST HOSP IP/OBS HIGH 75: CPT | Mod: AI | Performed by: INTERNAL MEDICINE

## 2017-06-12 PROCEDURE — 80076 HEPATIC FUNCTION PANEL: CPT | Performed by: FAMILY MEDICINE

## 2017-06-12 PROCEDURE — 99207 ZZC CDG-UP CODE -MED NECESSITY: CPT | Performed by: INTERNAL MEDICINE

## 2017-06-12 PROCEDURE — 99207 ZZC MOONLIGHTING INDICATOR: CPT | Performed by: INTERNAL MEDICINE

## 2017-06-12 PROCEDURE — 96374 THER/PROPH/DIAG INJ IV PUSH: CPT

## 2017-06-12 PROCEDURE — 25000128 H RX IP 250 OP 636: Performed by: FAMILY MEDICINE

## 2017-06-12 PROCEDURE — 99285 EMERGENCY DEPT VISIT HI MDM: CPT | Mod: 25 | Performed by: FAMILY MEDICINE

## 2017-06-12 PROCEDURE — 85610 PROTHROMBIN TIME: CPT | Performed by: FAMILY MEDICINE

## 2017-06-12 PROCEDURE — 25000128 H RX IP 250 OP 636: Performed by: INTERNAL MEDICINE

## 2017-06-12 PROCEDURE — 84300 ASSAY OF URINE SODIUM: CPT | Performed by: INTERNAL MEDICINE

## 2017-06-12 PROCEDURE — 36415 COLL VENOUS BLD VENIPUNCTURE: CPT | Performed by: INTERNAL MEDICINE

## 2017-06-12 PROCEDURE — 99285 EMERGENCY DEPT VISIT HI MDM: CPT | Mod: 25

## 2017-06-12 PROCEDURE — 00000146 ZZHCL STATISTIC GLUCOSE BY METER IP

## 2017-06-12 PROCEDURE — A9270 NON-COVERED ITEM OR SERVICE: HCPCS | Mod: GY | Performed by: INTERNAL MEDICINE

## 2017-06-12 PROCEDURE — 83880 ASSAY OF NATRIURETIC PEPTIDE: CPT | Performed by: FAMILY MEDICINE

## 2017-06-12 PROCEDURE — 85025 COMPLETE CBC W/AUTO DIFF WBC: CPT | Performed by: FAMILY MEDICINE

## 2017-06-12 PROCEDURE — 25000132 ZZH RX MED GY IP 250 OP 250 PS 637: Mod: GY | Performed by: PEDIATRICS

## 2017-06-12 RX ORDER — ISOSORBIDE MONONITRATE 30 MG/1
30 TABLET, EXTENDED RELEASE ORAL DAILY
Status: DISCONTINUED | OUTPATIENT
Start: 2017-06-13 | End: 2017-06-16 | Stop reason: HOSPADM

## 2017-06-12 RX ORDER — ONDANSETRON 2 MG/ML
4 INJECTION INTRAMUSCULAR; INTRAVENOUS EVERY 6 HOURS PRN
Status: DISCONTINUED | OUTPATIENT
Start: 2017-06-12 | End: 2017-06-16 | Stop reason: HOSPADM

## 2017-06-12 RX ORDER — METOPROLOL TARTRATE 25 MG/1
25 TABLET, FILM COATED ORAL 2 TIMES DAILY
Status: DISCONTINUED | OUTPATIENT
Start: 2017-06-12 | End: 2017-06-13

## 2017-06-12 RX ORDER — FUROSEMIDE 10 MG/ML
40 INJECTION INTRAMUSCULAR; INTRAVENOUS ONCE
Status: DISCONTINUED | OUTPATIENT
Start: 2017-06-12 | End: 2017-06-12

## 2017-06-12 RX ORDER — FERROUS SULFATE 325(65) MG
325 TABLET ORAL
Status: DISCONTINUED | OUTPATIENT
Start: 2017-06-13 | End: 2017-06-16 | Stop reason: HOSPADM

## 2017-06-12 RX ORDER — GABAPENTIN 300 MG/1
300 CAPSULE ORAL 2 TIMES DAILY
Status: DISCONTINUED | OUTPATIENT
Start: 2017-06-12 | End: 2017-06-13

## 2017-06-12 RX ORDER — WARFARIN SODIUM 5 MG/1
5 TABLET ORAL
Status: COMPLETED | OUTPATIENT
Start: 2017-06-12 | End: 2017-06-12

## 2017-06-12 RX ORDER — ACETAMINOPHEN 325 MG/1
650 TABLET ORAL EVERY 4 HOURS PRN
Status: DISCONTINUED | OUTPATIENT
Start: 2017-06-12 | End: 2017-06-16 | Stop reason: HOSPADM

## 2017-06-12 RX ORDER — ONDANSETRON 4 MG/1
4 TABLET, ORALLY DISINTEGRATING ORAL EVERY 6 HOURS PRN
Status: DISCONTINUED | OUTPATIENT
Start: 2017-06-12 | End: 2017-06-16 | Stop reason: HOSPADM

## 2017-06-12 RX ORDER — METOLAZONE 2.5 MG/1
5 TABLET ORAL DAILY
Status: DISCONTINUED | OUTPATIENT
Start: 2017-06-12 | End: 2017-06-15

## 2017-06-12 RX ORDER — FUROSEMIDE 10 MG/ML
40 INJECTION INTRAMUSCULAR; INTRAVENOUS ONCE
Status: COMPLETED | OUTPATIENT
Start: 2017-06-12 | End: 2017-06-12

## 2017-06-12 RX ORDER — PRAVASTATIN SODIUM 20 MG
40 TABLET ORAL DAILY
Status: DISCONTINUED | OUTPATIENT
Start: 2017-06-12 | End: 2017-06-16 | Stop reason: HOSPADM

## 2017-06-12 RX ORDER — DEXTROSE MONOHYDRATE 25 G/50ML
25-50 INJECTION, SOLUTION INTRAVENOUS
Status: DISCONTINUED | OUTPATIENT
Start: 2017-06-12 | End: 2017-06-16 | Stop reason: HOSPADM

## 2017-06-12 RX ORDER — NALOXONE HYDROCHLORIDE 0.4 MG/ML
.1-.4 INJECTION, SOLUTION INTRAMUSCULAR; INTRAVENOUS; SUBCUTANEOUS
Status: DISCONTINUED | OUTPATIENT
Start: 2017-06-12 | End: 2017-06-16 | Stop reason: HOSPADM

## 2017-06-12 RX ORDER — NICOTINE POLACRILEX 4 MG
15-30 LOZENGE BUCCAL
Status: DISCONTINUED | OUTPATIENT
Start: 2017-06-12 | End: 2017-06-16 | Stop reason: HOSPADM

## 2017-06-12 RX ADMIN — PRAVASTATIN SODIUM 40 MG: 20 TABLET ORAL at 21:13

## 2017-06-12 RX ADMIN — WARFARIN SODIUM 5 MG: 5 TABLET ORAL at 22:46

## 2017-06-12 RX ADMIN — METOPROLOL TARTRATE 25 MG: 25 TABLET ORAL at 21:13

## 2017-06-12 RX ADMIN — FUROSEMIDE 10 MG/HR: 10 INJECTION, SOLUTION INTRAVENOUS at 21:26

## 2017-06-12 RX ADMIN — GABAPENTIN 300 MG: 300 CAPSULE ORAL at 21:14

## 2017-06-12 RX ADMIN — METOLAZONE 5 MG: 2.5 TABLET ORAL at 21:14

## 2017-06-12 RX ADMIN — FUROSEMIDE 40 MG: 10 INJECTION, SOLUTION INTRAVENOUS at 14:26

## 2017-06-12 NOTE — IP AVS SNAPSHOT
` [address] SURGICAL: [phone]            Medication Administration Report for Kathryn Banks as of 06/16/17 1000   Legend:    Given Hold Not Given Due Canceled Entry Other Actions    Time Time (Time) Time  Time-Action       Inactive    Active    Linked        Medications 06/10/17 06/11/17 06/12/17 06/13/17 06/14/17 06/15/17 06/16/17    acetaminophen (TYLENOL) tablet 650 mg  Dose: 650 mg Freq: EVERY 4 HOURS PRN Route: PO  PRN Reason: mild pain  Start: 06/12/17 2017   Admin Instructions: Alternate ibuprofen (if ordered) with acetaminophen.  Maximum acetaminophen dose from all sources = 75 mg/kg/day not to exceed 4 grams/day.               bumetanide (BUMEX) tablet 2 mg  Dose: 2 mg Freq: DAILY Route: PO  Start: 06/15/17 1245         1329 (2 mg)-Given        0828 (2 mg)-Given           ferrous sulfate (IRON) tablet 325 mg  Dose: 325 mg Freq: DAILY WITH BREAKFAST Route: PO  Start: 06/13/17 0800   Admin Instructions: Absorbed best on an empty stomach. If stomach upset occurs, can take with meals.        0814 (325 mg)-Given        0824 (325 mg)-Given        0745 (325 mg)-Given        0828 (325 mg)-Given           gabapentin (NEURONTIN) capsule 300 mg  Dose: 300 mg Freq: DAILY Route: PO  Start: 06/14/17 0900        0824 (300 mg)-Given        0912 (300 mg)-Given        0829 (300 mg)-Given           glucose 40 % gel 15-30 g  Dose: 15-30 g Freq: EVERY 15 MIN PRN Route: PO  PRN Reason: low blood sugar  Start: 06/12/17 2017   Admin Instructions: Give 15 g for BG 51 to 69 mg/dL IF patient is conscious and able to swallow. Give 30 g for BG less than or equal to 50 mg/dL IF patient is conscious and able to swallow. Do NOT give glucose gel via enteral tube.  IF patient has enteral tube: give apple juice 120 mL (4 oz or 15 g of CHO) via enteral tube for BG 51 to 69 mg/dL.  Give apple juice 240 mL (8 oz or 30 g of CHO) via enteral tube for BG less than or equal to 50 mg/dL.              Or  dextrose  50 % injection 25-50 mL  Dose: 25-50 mL Freq: EVERY 15 MIN PRN Route: IV  PRN Reason: low blood sugar  Start: 06/12/17 2017   Admin Instructions: Use if have IV access, BG less than 70 mg/dL and meet dose criteria below:  Dose if conscious and alert (or disorientated) and NPO = 25 mL  Dose if unconscious / not alert = 50 mL  Vesicant.              Or  glucagon injection 1 mg  Dose: 1 mg Freq: EVERY 15 MIN PRN Route: SC  PRN Reason: low blood sugar  PRN Comment: May repeat x 1 only  Start: 06/12/17 2017   Admin Instructions: May give SQ or IM. IF BG less than or equal to 50 mg/dL and no IV access.  ONLY use glucagon IF patient has NO IV access AND is UNABLE to swallow.               insulin aspart (NovoLOG) inj (RAPID ACTING)  Dose: 1-5 Units Freq: AT BEDTIME Route: SC  Start: 06/12/17 2100   Admin Instructions: MEDIUM INSULIN RESISTANCE DOSING    Do Not give Bedtime Correction Insulin if BG less than  200.   For  - 249 give 1 units.   For  - 299 give 2 units.   For  - 349 give 3 units.   For  -399 give 4 units.   For BG greater than or equal to 400 give 5 units.  Notify provider if glucose greater than or equal to 350 mg/dL after administration of correction dose.  If given at mealtime, must be administered 5 min before meal or immediately after.       (2116)-Not Given [C]        (2050)-Not Given        (2059)-Not Given        (2122)-Not Given        [ ] 2100           insulin aspart (NovoLOG) inj (RAPID ACTING)  Dose: 1-7 Units Freq: 3 TIMES DAILY BEFORE MEALS Route: SC  Start: 06/13/17 0800   Admin Instructions: Correction Scale - MEDIUM INSULIN RESISTANCE DOSING     Do Not give Correction Insulin if Pre-Meal BG less than 140.   For Pre-Meal  - 189 give 1 unit.   For Pre-Meal  - 239 give 2 units.   For Pre-Meal  - 289 give 3 units.   For Pre-Meal  - 339 give 4 units.   For Pre-Meal - 399 give 5 units.   For Pre-Meal -449 give 6 units  For Pre-Meal BG  greater than or equal to 450 give 7 units.   To be given with prandial insulin, and based on pre-meal blood glucose.    Notify provider if glucose greater than or equal to 350 mg/dL after administration of correction dose.  If given at mealtime, must be administered 5 min before meal or immediately after.        (0815)-Not Given       1151 (1 Units)-Given       (1650)-Not Given        (0826)-Not Given       (1136)-Not Given       1728 (1 Units)-Given        (0751)-Not Given       1151 (2 Units)-Given [C]       (1707)-Not Given        (0829)-Not Given       [ ] 1200       [ ] 1700           iron sucrose (VENOFER) 200 mg in NaCl 0.9 % 100 mL intermittent infusion  Dose: 200 mg Freq: DAILY Route: IV  Last Dose: 200 mg (06/15/17 1348)  Start: 06/15/17 1245   End: 06/20/17 0859         1348 (200 mg)-New Bag        (0929)-Not Given           isosorbide mononitrate (IMDUR) 24 hr tablet 30 mg  Dose: 30 mg Freq: DAILY Route: PO  Start: 06/13/17 0900   Admin Instructions: DO NOT CRUSH. Can split tablet in half along score radha.        0814 (30 mg)-Given        0825 (30 mg)-Given        0913 (30 mg)-Given        0829 (30 mg)-Given           metoprolol (LOPRESSOR) tablet 50 mg  Dose: 50 mg Freq: 2 TIMES DAILY Route: PO  Start: 06/13/17 0900       0814 (50 mg)-Given       2050 (50 mg)-Given        0826 (50 mg)-Given       2058 (50 mg)-Given        0912 (50 mg)-Given       2127 (50 mg)-Given        0829 (50 mg)-Given       [ ] 2100           naloxone (NARCAN) injection 0.1-0.4 mg  Dose: 0.1-0.4 mg Freq: EVERY 2 MIN PRN Route: IV  PRN Reason: opioid reversal  Start: 06/12/17 2017   Admin Instructions: For respiratory rate LESS than or EQUAL to 8.  Partial reversal dose:  0.1 mg titrated q 2 minutes for Analgesia Side Effects Monitoring Sedation Level of 3 (frequently drowsy, arousable, drifts to sleep during conversation).Full reversal dose:  0.4 mg bolus for Analgesia Side Effects Monitoring Sedation Level of 4 (somnolent,  minimal or no response to stimulation).               ondansetron (ZOFRAN-ODT) ODT tab 4 mg  Dose: 4 mg Freq: EVERY 6 HOURS PRN Route: PO  PRN Reason: nausea  Start: 06/12/17 2017   Admin Instructions: This is Step 1 of nausea and vomiting management.  If nausea not resolved in 15 minutes, go to Step 2 prochlorperazine (COMPAZINE). Do not push through foil backing. Peel back foil and gently remove. Place on tongue immediately. Administration with liquid unnecessary              Or  ondansetron (ZOFRAN) injection 4 mg  Dose: 4 mg Freq: EVERY 6 HOURS PRN Route: IV  PRN Reasons: nausea,vomiting  Start: 06/12/17 2017   Admin Instructions: This is Step 1 of nausea and vomiting management.  If nausea not resolved in 15 minutes, go to Step 2 prochlorperazine (COMPAZINE).  Irritant.               Patient is already receiving anticoagulation with heparin, enoxaparin (LOVENOX), warfarin (COUMADIN)  or other anticoagulant medication  Freq: CONTINUOUS PRN Route: XX  Start: 06/12/17 2017              pravastatin (PRAVACHOL) tablet 40 mg  Dose: 40 mg Freq: DAILY Route: PO  Start: 06/12/17 2030 2113 (40 mg)-Given        0814 (40 mg)-Given        0824 (40 mg)-Given        0912 (40 mg)-Given        0828 (40 mg)-Given           Warfarin Therapy Reminder (Check START DATE - warfarin may be starting in the FUTURE)  Freq: CONTINUOUS PRN Route: XX  Start: 06/12/17 2017   Admin Instructions: *Note to reorder warfarin daily*  Pharmacy Warfarin Dosing Service  Patient is on Warfarin Therapy - check for daily order              Completed Medications  Medications 06/10/17 06/11/17 06/12/17 06/13/17 06/14/17 06/15/17 06/16/17         Dose: 2.5 mg Freq: ONCE AT 6PM Route: PO  Start: 06/15/17 1800   End: 06/15/17 1832         1832 (2.5 mg)-Given              Dose: 5 mg Freq: ONCE AT 6PM Route: PO  Start: 06/13/17 1800   End: 06/13/17 1751       1751 (5 mg)-Given             Discontinued Medications  Medications 06/10/17 06/11/17 06/12/17  06/13/17 06/14/17 06/15/17 06/16/17         Rate: 10 mL/hr Freq: CONTINUOUS Route: IV  Last Dose: Stopped (06/15/17 1245)  Start: 06/12/17 2030   End: 06/15/17 1242      2126 (10 mg/hr)-New Bag        0633 (10 mg/hr)-New Bag       1653 (10 mg/hr)-New Bag        0224 (10 mg/hr)-New Bag       1535 (10 mg/hr)-New Bag        0015 (10 mg/hr)-New Bag       1242-Med Discontinued  1245-Stopped              Dose: 300 mg Freq: 2 TIMES DAILY Route: PO  Start: 06/12/17 2100   End: 06/13/17 1726      2114 (300 mg)-Given        0814 (300 mg)-Given       1726-Med Discontinued            Dose: 5 mg Freq: DAILY Route: PO  Start: 06/12/17 2030   End: 06/15/17 1242      2114 (5 mg)-Given        0814 (5 mg)-Given        0825 (5 mg)-Given        0912 (5 mg)-Given       1242-Med Discontinued          Dose: 1 each Freq: NO DOSE TODAY (WARFARIN) Route: XX  Start: 06/14/17 1104   End: 06/15/17 0003   Admin Instructions: No dose of Warfarin due today per order          0003-Med Discontinued

## 2017-06-12 NOTE — IP AVS SNAPSHOT
` ` Patient Information     Patient Name Sex     Kathryn Banks ([id number]) Female 1940       Room Bed    [address] [facility] [place]      Patient Demographics     Address Phone E-mail Address    [address] [address] [phone] (Home)  none (Work)  none (Mobile) [email or web address]      Patient Ethnicity & Race     Ethnic Group Patient Race    American White      Emergency Contact(s)     Name Relation Home Work Mobile    Urbano Banks Spouse [phone] none none    yoshi roth Daughter [phone] none [phone]    Varsha Jade Relative [phone] none none      Documents on File        Status Date Received Description       Documents for the Patient    Privacy Notice - [place] Received 08     Face Sheet  () 08     Insurance Card  () 04     Face Sheet  () 07     Insurance Card  () 03/15/06     Insurance Card  () 06     Insurance Card  () 07     Waiver - Payment  07            Insurance Card  () 07     Insurance Card Received () 09     External Medication Information Consent Accepted () 09     Insurance Card Received () 04/14/10 MEDICARE    Face Sheet Received () 04/14/10     Patient ID Received 14     Consent for Services - Hospital/Clinic Received () 11/03/10     Insurance Card Received () 10/31/10     Insurance Card  ()      Consent for Services - Hospital/Clinic Received () 11 CONSENT FOR SERVICES    External Medication Information Consent Accepted () 11     CMS IM for Patient Signature       Physical Therapy Certification Received () 12     External Medication Information Consent Accepted () 12     Insurance Card Received () 12     Consent for Services - Hospital/Clinic Received () 12     Consent to Communicate   PHI - 12    HIM EARLENE  Authorization  12 NDBC 12    Physical Therapy Certification Received 13     Advance Directives and Living Will Received 13 VALIDATION OF AD 7/3/08    Advance Directives and Living Will Received 13 HEALTHCARE DIRECTIVE 7/3/08    Consent for EHR Access  13 Copied from existing Consent for services - C/HOD collected on 2012    [facility] Specified Other       Insurance Card Received 13 medicare    External Medication Information Consent Accepted 13     Consent for EHR Access Received 13     Consent for Services - Hospital/Clinic Received () 13     HIM EARLENE Authorization  14 Patient    HIM EARLENE Authorization  14 PATIENT    Advance Directives and Living Will Received 14 Health Care Directive  14    Advance Directives and Living Will Received 14 Validation of AD 14    Insurance Card Received () 14 hp freedom plan    Physical Therapy Re-Certification Received 14 to 11/3/14    Consent for Services - Hospital/Clinic Received () 14     Insurance Card Received 03/23/15 HP/rx    HIM EARLENE Authorization  04/03/15 Kathryn Banks    Consent for Services/Privacy Notice - Hospital/Clinic Received () 16     Insurance Card Received 16     Business/Insurance/Care Coordination/Health Form - Patient  16 ADULT REHABILITATION ATTENDANCE POLICY    Business/Insurance/Care Coordination/Health Form - Patient  16 CONSUMER PRICELINE    HIM EARLENE Authorization  10/14/16 Kathryn Banks    Business/Insurance/Care Coordination/Health Form - Patient  17 ADULT REHABILITATION ATTENDANCE POLICY    Insurance Card Received 17 BCBS    HIM EARLENE Authorization  17     Consent for Services/Privacy Notice - Hospital/Clinic Received 17     Consent for EHR Access Received 17     Consent for Services/Privacy Notice - Hospital/Clinic Received 17     Face Sheet  Received (Deleted) 08/05/09     Advance Directives and Living Will Received (Deleted) 07/03/08     Advance Directives and Living Will Received (Deleted) 02/01/13 VALIDATION OF ADVANCE DIRECTIVE 7-3-2008    Insurance Card Received (Deleted) 09/11/14        Documents for the Encounter    CMS IM for Patient Signature Received 06/13/17     Orders   order for chest xray    Orders  06/14/17 ED PROVIDER ORDER SHEET    ED Notes - Emergency Department  06/14/17 EMERGENCY DEPARTMENT PHYSICIANS  DOWNTIME RECORD    ED Notes - Emergency Department  06/14/17 EMERGENCY DEPARTMENT NURSING DOWNTIME    Orders  06/14/17 MEDICATION RECONCILIATION AND ORDER FORM    Progress Note/Clinic Note  06/14/17 PROGRESS NOTES    EMS/Ambulance Record  06/15/17 [facility] [place] AMBULANCE EMS RECORD - [facility] HANDOFF    Assessment/Questionnaire  06/15/17 [facility] SLUMS EXAMINATION    Discharge Attachment   BUMETANIDE ORAL TABLET (ENGLISH)    Discharge Attachment   ACETAMINOPHEN ORAL CAPSULE (ENGLISH)    Discharge Attachment   METOLAZONE (ENGLISH)      Admission Information     Attending Provider Admitting Provider Admission Type Admission Date/Time    [facility] 06/12/17  1532    Discharge Date Hospital Service Auth/Cert Status Service Area     Hospitalist Incomplete [facility]    Unit Room/Bed Admission Status       PH 2A MEDICAL SURGICAL [id number] Admission (Confirmed)       Admission     Complaint    Acute on chronic renal failure (H)      Hospital Account     Name Acct ID Class Status Primary Coverage    Kathryn Banks [id number] Inpatient Open MEDICARE - MEDICARE FOR HB SUPPLEMENT            Guarantor Account (for Hospital Account #[id number])     Name Relation to Pt Service Area Active? Acct Type    Kathryn Banks  FCS Yes Personal/Family    Address Phone          [address] [place] [address] [phone](H)              Coverage Information (for Hospital Account  #[id number])     1. MEDICARE/MEDICARE FOR HB SUPPLEMENT     F/O Payor/Plan Precert #    MEDICARE/MEDICARE FOR HB SUPPLEMENT     Subscriber Subscriber #    JacoboKathryn harper [id number]    Address Phone    [employer]  [address]  [place] [address] [phone]          2. BCBS/BCBS PLATINUM BLUE     F/O Payor/Plan Precert #    BCBS/BCBS PLATINUM BLUE     Subscriber Subscriber #    JacoboKathryn harper [id number]    Address Phone    [address] [address]  [place] [address] [phone]

## 2017-06-12 NOTE — PROGRESS NOTES
Clinic Care Coordination Contact    Care Coordination Communication    Referral Source: [facility]    Clinical Data: Patient was hospitalized at [facility] from 5/31 to 6/6 with diagnosis of Congestive Heart Failure.     Home Care Contact:   Home Care Agency: [facility]   Contact name () and phone number: Leona [phone]   Care Coordination contacted home care: Yes   Anticipated start of care date: 6/7    Patient Contact:        Introduced self and role of care coordination. Called and spoke with pt, states she feels she is doing ok since being home.  She has been putting on some weight again and not sure why. Home care  has been out and helping her with medications and INR. Pt was unaware of her appt today but states she will be able to make it.   Discharge instructions were reviewed with patient/caregiver.    Do you have any questions about your medications? no   Follow up appointment is scheduled for 6/12.   Provided 24 Hour Nurse Line and/or 24 Hour Appointment Scheduling: Yes: 24 Hour Nurse Line - [phone], 24 Hour Appointment Scheduling - [phone]   Home care has contacted patient: Yes   Patient questions/concerns: no current concerns       Plan: RN/SW Care Coordinator will await notification from home care staff informing RN/SW Care Coordinator of patients discharge plans/needs. RN/SW Care Coordinator will review chart and outreach to home care every 4 weeks and as needed.      Pt will also be enrolled in [employer] and will outreach with any variances.      Meenakshi Larsen RN,BSN  Clinical Care Coordinator    [facility] [phone]  [facility] [phone]

## 2017-06-12 NOTE — IP AVS SNAPSHOT
` `     [address] SURGICAL: [phone]                 INTERAGENCY TRANSFER FORM - NOTES (H&P, Discharge Summary, Consults, Procedures, Therapies)   2017                    Hospital Admission Date: 2017  MARI HERNANDEZ   : 1940  Sex: Female        Patient PCP Information     Provider PCP Type    Dheeraj Jj MD General         History & Physicals      H&P by Mike Wong MD at 2017  6:56 PM     Author:  Mike Wong MD Service:  Hospitalist Author Type:  Physician    Filed:  2017  7:07 PM Date of Service:  2017  6:56 PM Creation Time:  2017  6:55 PM    Status:  Addendum :  Mike Wong MD (Physician)         [facility]    History and Physical  Hospitalist       Date of Admission:  2017  Date of Service (when I saw the patient):[DF1.1] 17    Assessment & Plan[DF1.2]       Active Problems:    Acute on chronic renal failure (H)    Assessment: patient sent to the ED due to weight gain.  She is feeling well without complaints.  She was discharged 6 days ago with lasix and home care has been following.  There is a note two days after discharge stating patient seems to be complaint with her medications but patient today is stating she has not been taking her second dose of lasix reliably.  She was noted to be 4 pounds up from discharge two days after she left and is now 12 pounds up from discharge.  Now it has gotten to the point that oral medications are unlikely to be successful based on how difficult it was to remove fluid during her last hospital stay.  She has had an ablation since she was last discharged.  During her last visit she appeared to have an ATN but her creatinine has continued to climb.  Nephrology was consulted by phone by Dr. Morales who suggested admission for diuresis and then outpatient f/u but they did not think she would need ultrafiltration since she has been  non complaint.[DF1.1] Renal ultrasound unremarkable 12 days ago[DF1.3]    Plan: admit to inpatient, IV lasix infusion and metolazone for now, will add albumin if not diuresing well tonight, moreira for strict I and O's and will monitor her weights daily.  Will also monitor on tele to make sure her a fib is controlled[DF1.1].  Will check ua and FENA.[DF1.3]      Type 2 diabetes mellitus with other circulatory complications (H)    Assessment: chronic    Plan: will hold po meds for now and cover with correction scale as needed      Atrial flutter (H)    Assessment: has now been ablated but on exam appears to be in a fib which has been suspected previously    Plan: pharmacy to manage coumadin, monitor on tele and continue metoprolol      Memory loss    Assessment: was noted during her last hospital stay and comes into question with missing more doses of her medicines again at home    Plan: OT evaluation      Chronic atrial fibrillation (H)    Assessment: was suspected previously and now appears to be present after her ablation for flutter    Plan: as above      Restless leg syndrome    Assessment: with confusion will hold her mirapex for now    Plan: as above      CAD - NSTEMI, CABG x 5 2007, angio in 2013 with patent grafts other than occluded graft to PL    Assessment: asymptomatic    Plan: no acute intervention      Anemia    Assessment: stable and consistent with anemia of chronic kidney disease    Plan: monitor      Non morbid obesity, unspecified obesity type    Assessment: chronic    Plan: no acute intervention      Essential hypertension with goal blood pressure less than 140/90    Assessment: chronic and controlled    Plan: continue home meds and monitor      Pulmonary hypertension (H)    Assessment: mild on her last echo    Plan: no intervention    DVT Prophylaxis: Warfarin  Code Status: Full Code    Disposition: Expected discharge in 2-3 days once fluid is off and her weight is stable and plan in place for  medication management.[DF1.1]    Mike Wong MD    Primary Care Physician   Dheeraj Jj    Chief Complaint[DF1.2]   Weight gain    History is obtained from the patient[DF1.1]    History of Present Illness[DF1.2]   Kathryn Banks is a 77 year old female who presents with weight gain.  She was just discharged from the hospital 6 days ago.  She is feeling well but has gained 12 pounds since discharge.  She has been getting HHC daily but she admits she has not always taken her second dose of lasix.  Nursing notes from two days after discharge state she has been complaint with her medications but the patient says she forgets her second lasix dose but can't explain why she is forgetting.  She has not had any CP or SOB.  She has had some increased swelling in both legs. No fever, chills or sweats.[DF1.1]    Past Medical History[DF1.2]    I have reviewed this patient's medical history and updated it with pertinent information if needed.[DF1.1]   Past Medical History:   Diagnosis Date     Carpal tunnel syndrome      Coronary atherosclerosis of native coronary artery 2006    5 vessel CABG     OBSTRUCTIVE SLEEP APNEA     using CPAP     Osteoarthrosis, unspecified whether generalized or localized, unspecified site     generalized arthritis, particularly in her hands and feet         Other and combined forms of senile cataract 2000    Bilateral     Other and unspecified hyperlipidemia      RESTLESS LEGS SYNDROME      TENOSYNOV HAND/WRIST NEC 6/30/2006     Type II or unspecified type diabetes mellitus without mention of complication, not stated as uncontrolled 2001    Diabetes mellitus/pt is diet controlled with weight loss     Typical atrial flutter (H) 01/17/2007     Unspecified essential hypertension        Past Surgical History[DF1.2]   I have reviewed this patient's surgical history and updated it with pertinent information if needed.[DF1.1]  Past Surgical History:   Procedure Laterality Date     ANGIOPLASTY        C APPENDECTOMY       C CABG, VEIN, FIVE  12/06    SVG x 4 and LIMA to LAD     C SOTO W/O FACETEC FORAMOT/DSKC 1/2 VRT SEG, LUMBAR  1968     C REMV CATARACT INTRACAP,INSERT LENS      Bilateral     C TOTAL ABDOM HYSTERECTOMY  1995     - fibroids     C VAGOTOMY/PYLOROPLASTY,SELECT  1970     COLONOSCOPY  12/3/2007     COLONOSCOPY  1/17/2014    Procedure: COLONOSCOPY;  Colonoscopy;  Surgeon: Zack Stearns MD;  Location:  GI     CYSTOSCOPY  11/25/09    [facility]     ENDOSCOPY  03/21/2000    Upper GI     HC COLONOSCOPY THRU STOMA, DIAGNOSTIC  10/7/04    poor prep, repeat in 2-3 years     HC COLONOSCOPY W/WO BRUSH/WASH  10/31/07    Repeat-poor quality prep     HC REMOVAL OF OVARY/TUBE(S)      Salpingo-Oophorectomy, Unilateral     HC REVISE MEDIAN N/CARPAL TUNNEL SURG      Carpal tunnel release     HC UGI ENDOSCOPY DIAG W BIOPSY  10/31/07     HC UGI ENDOSCOPY, SIMPLE EXAM  12/3/2007     OPEN REDUCTION INTERNAL FIXATION HIP NAILING Left 7/3/2016    Procedure: OPEN REDUCTION INTERNAL FIXATION HIP NAILING;  Surgeon: Lake Schulz MD;  Location:  OR       Prior to Admission Medications   Prior to Admission Medications   Prescriptions Last Dose Informant Patient Reported? Taking?   GABAPENTIN PO 6/12/2017 at 0900  Yes Yes   Sig: Take 300 mg by mouth 2 times daily    GLIPIZIDE XL PO 6/12/2017 at 0900  Yes Yes   Sig: Take 10 mg by mouth daily   ORDER FOR DME   No No   Sig: Equipment being ordered: cpap machine, mask, humidifier, tubing and filters   PRAVASTATIN SODIUM PO Unknown at 2100  Yes No   Sig: Take 40 mg by mouth daily   calcium carbonate-vitamin D 500-400 MG-UNIT TABS tablt 6/12/2017 at 0900  No Yes   Sig: Take 1 tablet by mouth 3 times daily   Patient taking differently: Take 1 tablet by mouth once    cyanocobalamin (VITAMIN B12) 1000 MCG/ML injection Past Month at Unknown time  No Yes   Sig: Inject 1 mL (1,000 mcg) into the muscle every 30 days   ferrous sulfate (IRON) 325 (65 FE) MG tablet 6/12/2017 at  0900  No Yes   Sig: Take 1 tablet (325 mg) by mouth daily (with breakfast)   furosemide (LASIX) 40 MG tablet 2017 at 0900  No Yes   Si mg a day   Patient taking differently: 80 mg daily =40 mg a day   isosorbide mononitrate (IMDUR) 30 MG 24 hr tablet 2017 at 0900  No Yes   Sig: Take 3 tablets (90 mg) by mouth daily   Patient taking differently: Take 30 mg by mouth daily    metoprolol (LOPRESSOR) 25 MG tablet 2017 at 0900  No Yes   Sig: Take 1 tablet (25 mg) by mouth 2 times daily   nitroglycerin (NITROSTAT) 0.4 MG SL tablet More than a month at Unknown time  No No   Sig: Place 1 tablet under the tongue See Admin Instructions. for chest pain   pramipexole (MIRAPEX) 1 MG tablet 2017 at 2100  No Yes   Sig: TAKE 3 TABLETS BY MOUTH EVERY EVENING   warfarin (COUMADIN) 2.5 MG tablet 2017 at 2100  No Yes   Sig: Take 1 tablet on  and 2 tablets on  and recheck INR on       Facility-Administered Medications: None     Allergies   Allergies   Allergen Reactions     Ciprofloxacin Nausea and Vomiting     Zofran did not help     Oxycodone Visual Disturbance     Delusions, blackouts      Lisinopril Cough     Penicillins Rash     Sulfa Drugs GI Disturbance     LOSS OF TASTE       Social History[DF1.2]   I have reviewed this patient's social history and updated it with pertinent information if needed. Kathryn Banks[DF1.1]  reports that she quit smoking about 48 years ago. She quit after 10.00 years of use. She has never used smokeless tobacco. She reports that she drinks alcohol. She reports that she does not use illicit drugs.    Family History[DF1.2]   I have reviewed this patient's family history and updated it with pertinent information if needed.[DF1.1]   Family History   Problem Relation Age of Onset     DIABETES Father      AODM     Neurologic Disorder Father      Parkinson's     Blood Disease Father       from blood clot from leg to lung immediately after hip fracture     DIABETES  Paternal Grandmother      adult onset     DIABETES Maternal Grandmother      adult onset     Hypertension No family hx of      CEREBROVASCULAR DISEASE No family hx of        Review of Systems[DF1.2]   The 10 point Review of Systems is negative other than noted in the HPI or here.[DF1.1]     Physical Exam[DF1.2]                      Vital Signs with Ranges[DF1.1]     0 lbs 0 oz[DF1.2]    Constitutional:   awake, alert, cooperative, no apparent distress, and appears stated age     Eyes:   Lids and lashes normal, pupils equal, round and reactive to light, extra ocular muscles intact, sclera clear, conjunctiva normal     ENT:   Normocephalic, without obvious abnormality, atraumatic, sinuses nontender on palpation, external ears without lesions, oral pharynx with moist mucous membranes, tonsils without erythema or exudates, gums normal and good dentition.     Neck:   Supple, symmetrical, trachea midline, no adenopathy, thyroid symmetric, not enlarged and no tenderness, skin normal     Hematologic / Lymphatic:   no cervical lymphadenopathy and no supraclavicular lymphadenopathy     Back:   Symmetric, no curvature, spinous processes are non-tender on palpation, paraspinous muscles are non-tender on palpation, no costal vertebral tenderness     Lungs:   No increased work of breathing, good air exchange, clear to auscultation bilaterally, no crackles or wheezing     Cardiovascular:   Irregularly irregular with no audible murmur, rub or gallop.  She has mild edema in her legs, arms, buttocks and face     Abdomen:   normal bowel sounds, soft, non-distended, non-tender, no masses palpated, no hepatosplenomegally     Neurologic:   Awake, alert, oriented to name, place and time.  Cranial nerves II-XII are grossly intact.  Motor is 5 out of 5 bilaterally.   Sensory is intact.[DF1.1]        Data[DF1.2]   Data reviewed today:  I personally reviewed no images or EKG's today.[DF1.1]    Recent Labs  Lab 06/12/17  1250 06/09/17  06/07/17  0740 06/06/17  0610   WBC 8.3  --   --  8.2   HGB 8.7*  --   --  9.7*   MCV 81  --   --  79     --   --  298   INR 2.44* 3.1 3.4* 2.48*   *  --  142 144   POTASSIUM 4.2  --  3.9 4.4   CHLORIDE 114*  --  102 101   CO2 24  --  33* 35*   BUN 60*  --  56* 63*   CR 2.49*  --  2.39* 2.76*   ANIONGAP 9  --  7 8   MALICK 7.6*  --  8.3* 8.0*   *  --  137* 66*   ALBUMIN 3.1*  --   --   --    PROTTOTAL 6.1*  --   --   --    BILITOTAL 0.3  --   --   --    ALKPHOS 177*  --   --   --    ALT 47  --   --   --    AST 42  --   --   --        Recent Results (from the past 24 hour(s))   XR Chest Port 1 View    Narrative    PORTABLE CHEST ONE VIEW   6/12/2017 4:38 PM     HISTORY: Bilateral lower extremity edema.    COMPARISON: Chest x-rays dated 5/31/2017.    FINDINGS: Cardiac silhouette remains enlarged even given technique.  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 Lungs are  otherwise grossly clear. No pneumothorax or significant pleural fluid  collection. No fracture.      Impression    IMPRESSION: Cardiomegaly is again noted. No other evidence of acute  cardiopulmonary disease is seen.    I discussed these findings with Dr. Morales on 6/12/2017 at  approximately 4:56 PM.[DF1.2]          Revision History        User Key Date/Time User Provider Type Action    > [N/A] 6/12/2017  7:07 PM Mike Wong MD Physician Addend     DF1.3 6/12/2017  7:06 PM Mike Wong MD Physician Sign     DF1.2 6/12/2017  6:56 PM Mike Wong MD Physician      DF1.1 6/12/2017  6:55 PM Mike Wong MD Physician                   Discharge Summaries     No notes of this type exist for this encounter.         Consult Notes      Consults by Sofie Ceja at 6/13/2017  2:24 PM     Author:  Sofie Ceja Service:  (none) Author Type:      Filed:  6/13/2017  2:24 PM Date of Service:  6/13/2017  2:24 PM Creation Time:  6/13/2017  2:19 PM    Status:  Signed :  Marcial  Sofie ()     Consult Orders:    1. Care Transition RN/SW IP Consult [[id number]] ordered by Arash Silva MD at 06/01/17 1354                Care Transition Initial Assessment - SW  Reason For Consult: discharge planning  Met with: Patient    Active Problems:    Restless leg syndrome    CAD - NSTEMI, CABG x 5 2007, angio in 2013 with patent grafts other than occluded graft to PL    Anemia    Non morbid obesity, unspecified obesity type    Type 2 diabetes mellitus with other circulatory complications (H)    Essential hypertension with goal blood pressure less than 140/90    Pulmonary hypertension (H)    Atrial flutter (H)    Acute on chronic renal failure (H)    Memory loss    Chronic atrial fibrillation (H)         DATA  Lives With: spouse, grandchild(zoe)  Living Arrangements: house  Description of Support System: Involved, Supportive  Who is your support system?: , Children  Support Assessment: Adequate family and caregiver support  Identified issues/concerns regarding health management:  Managing medications and chronic CHF     Transportation Available: car, family or friend will provide      ASSESSMENT  Cognitive Status:  awake, alert and oriented  Concerns to be addressed: Patient lives in her home with her .  She has a child and grandchild also living with them.  [facility] currently has [facility] for [facility].  Patient states she is getting around well here and has been walking the halls.  Patient would like to continue [facility] at discharge.     PLAN  Financial costs for the patient includes None   Patient given options and choices for discharge Yes   Patient/family is agreeable to the plan?  Yes  Patient Goals and Preferences: Home   Patient anticipates discharging to:  Home   Discharge Planner   Discharge Plans in progress: Yes  Barriers to discharge plan: Non compliance with CHF  Follow up plan: -[facility]    [place]  [facility] [phone]/ [facility] [phone]         Entered by: Sofie  "Marcial 06/13/2017 2:19 PM[JK1.1]              Revision History        User Key Date/Time User Provider Type Action    > JK1.1 6/13/2017  2:24 PM Sofie Ceja  Sign            Consults by Cal Pham RPH at 6/12/2017 10:21 PM     Author:  Cal Pham RPH Service:  Pharmacy Author Type:  Pharmacist    Filed:  6/12/2017 10:21 PM Date of Service:  6/12/2017 10:21 PM Creation Time:  6/12/2017 10:21 PM    Status:  Signed :  Cal Pham RPH (Pharmacist)     Consult Orders:    1. Pharmacy to dose warfarin [[id number]] ordered by Mike Wong MD at 06/12/17 1852                See previous note[MM1.1]     Revision History        User Key Date/Time User Provider Type Action    > MM1.1 6/12/2017 10:21 PM Cal Pham RPH Pharmacist Sign                     Progress Notes - Physician (Notes from 06/13/17 through 06/16/17)      Progress Notes by Mauricio Hernandez at 6/16/2017  9:40 AM     Author:  Mauricio Hernandez Service:  Spiritual Health Author Type:      Filed:  6/16/2017  9:42 AM Date of Service:  6/16/2017  9:40 AM Creation Time:  6/16/2017  9:40 AM    Status:  Signed :  Mauricio Hernandez ()         SPIRITUAL HEALTH SERVICES  SPIRITUAL ASSESSMENT Progress Note  [facility]      (Follow up)   provided prayer & communion to pt.  Following communion, pt mentioned \"an incredible peace had come over her.\"  No follow up is needed.    Mauricio Hernandez M.Div., [facility]  Staff   Office tel: [phone][JV1.1]       Revision History        User Key Date/Time User Provider Type Action    > JV1.1 6/16/2017  9:42 AM Mauricio Hernandezin Sign            Progress Notes by Emy Proctor RN at 6/15/2017  2:13 PM     Author:  Emy Proctor RN Service:  (none) Author Type:  Registered Nurse    Filed:  6/15/2017  2:20 PM Date of Service:  6/15/2017  2:13 PM Creation Time:  6/15/2017  2:13 PM    Status:  Signed :  Emy Proctor RN " (Registered Nurse)         Pt talked staff into taking her to the gift shop to buy a nail file. Pt bought a nail file, polish remover, and two small bags of chips. Writer put the sack of items in pt's closet after she returned to the room to use the bathroom. Pt then requested the sack to retrieve her polish remover. She took the remover out and insisted on keeping the sack with her which contained the chips. Pt has been instructed on the importance of sticking to her diet for her health. Will continue to encourage adherence to diet.[SW1.1]      Revision History        User Key Date/Time User Provider Type Action    > SW1.1 6/15/2017  2:20 PM Emy Proctor, RN Registered Nurse Sign            Progress Notes by Arash Silva MD at 6/15/2017 12:37 PM     Author:  Arash Silva MD Service:  Hospitalist Author Type:  Physician    Filed:  6/15/2017  1:25 PM Date of Service:  6/15/2017 12:37 PM Creation Time:  6/15/2017 12:37 PM    Status:  Signed :  Arash Silva MD (Physician)         [facility]    Hospitalist Progress Note    Date of Service (when I saw the patient): 06/15/2017    Assessment & Plan   Kathryn Banks is a 77 year old female who was admitted on 6/12/2017[JK1.1] with concern for worsening volume overload due to acute on chronic renal failure.  Acute tubular necrosis is suspected although cause remains indeterminate.  Urine output has been excellent with aggressive diuretic therapy, and she has not had hyperkalemia or acidosis.  She remains moderately to severely uremic.  Volume status is now similar to if not improved as compared with the time of her recent hospital discharge.  She is persistently anemic with iron deficiency and suspected anemia of chronic kidney disease.  No active ongoing bleeding is appreciated although she reported rectal bleeding at the beginning of her hospital stay.  Chronic cardiac problems including dysrhythmia, CHF, pulmonary  hypertension, and CAD have remained stable over the last 24 hours.  Blood sugars have been well controlled.  INR is therapeutic on warfarin.[JK1.2]    Principal Problem:    Acute on chronic renal failure (H)  Active Problems:    Iron deficiency anemia    Type 2 diabetes mellitus with other circulatory complications (H)    Pulmonary hypertension (H)    Chronic heart failure (H) with preserved EF    Atrial flutter (H) - present 6/12    Memory loss    Chronic atrial fibrillation (H) on 6/12-6/13    Restless leg syndrome    CAD - NSTEMI, CABG x 5 2007, angio in 2013 with patent grafts other than occluded graft to PL    Anemia of chronic renal failure, stage 4 (severe) (H)    Non morbid obesity, unspecified obesity type    Essential hypertension with goal blood pressure less than 140/90    Blood in stool[JK1.1]    Discontinue IV Lasix infusion and start oral Bumex  Discontinue metolazone, anticipate use of metolazone less frequently after hospital discharge rather than daily  Continue Cee catheter for another 24 hours to monitor urine output  Administer IV Venofer today and daily until hospital discharge, continue low-dose oral iron therapy  Recommended outpatient nephrology consultation with which the patient was agreeable  Discussed long-term prognosis for recovery of renal function which does not appear to be good at this time, and there is suspicion that she will require renal replacement therapy in the near future as discussed with her today  Discussed disposition planning, anticipate discharge to TCU for short-term rehabilitation at discharge and ongoing discussions about possible long-term care[JK1.2]    DVT Prophylaxis:[JK1.1] Warfarin with dosing adjusted by pharmacy depending on INR[JK1.2]  Code Status: Full Code    Disposition: Expected discharge in[JK1.1] 1-2[JK1.2] days.[JK1.1]    Total floor time today 35-40 minutes including 20 minutes spent in direct face-to-face time counseling and discussion with the  patient.  Her questions and concerns regarding kidney failure were answered and addressed.[JK1.2]  Arash Silva MD    Interval History[JK1.1]   She feels better today overall.  She feels less swollen.  She denies dyspnea.  She has some left mid to lower back pain that is mild.  She has been afebrile.  She has been stable hemodynamically.  Oxygenation has been normal.  Urine output has been excellent.  Oral intake has been adequate.  She is ambulating though feels somewhat weak.  She has not noted any recurrent rectal bleeding.  She has no new complaints today.  However, she has multiple questions with respect to her kidney problems.[JK1.2]    -Data reviewed today: I reviewed all new labs and imaging results over the last 24 hours. I personally reviewed[JK1.1] no images or EKG's today[JK1.2].    Physical Exam   Temp: 98  F (36.7  C) Temp src: Oral BP: 104/55 Pulse: 67 Heart Rate: 67 Resp: 18 SpO2: 95 % O2 Device: None (Room air)    Vitals:    06/13/17 0439 06/14/17 0330 06/15/17 0300   Weight: 80 kg (176 lb 5.9 oz) 79.4 kg (175 lb 0.7 oz) 75.3 kg (166 lb 0.1 oz)     Vital Signs with Ranges  Temp:  [97.8  F (36.6  C)-98  F (36.7  C)] 98  F (36.7  C)  Pulse:  [65-80] 67  Heart Rate:  [65-80] 67  Resp:  [18-20] 18  BP: (104-122)/(46-62) 104/55  SpO2:  [95 %-97 %] 95 %  I/O last 3 completed shifts:  In: 558.7 [P.O.:340; I.V.:218.7]  Out: 4780 [Urine:4780]    Constitutional:[JK1.1] No acute distress sitting in a chair[JK1.2]  Respiratory:[JK1.1] Normal respiratory effort, clear lungs[JK1.2]  Cardiovascular:[JK1.1] Regular rate and rhythm[JK1.2]    Medications     - MEDICATION INSTRUCTIONS -       furosemide (LASIX) infusion ADULT 10 mg/hr (06/15/17 0015)     Warfarin Therapy Reminder         warfarin  2.5 mg Oral ONCE at 18:00     metoprolol  50 mg Oral BID     gabapentin (NEURONTIN) capsule 300 mg  300 mg Oral Daily     ferrous sulfate  325 mg Oral Daily with breakfast     isosorbide mononitrate  30 mg Oral Daily      pravastatin (PRAVACHOL) tablet 40 mg  40 mg Oral Daily     insulin aspart  1-7 Units Subcutaneous TID AC     insulin aspart  1-5 Units Subcutaneous At Bedtime     metolazone  5 mg Oral Daily       Data   Data reviewed today:  I personally reviewed[JK1.1] no images or EKG's today[JK1.2].    Recent Labs  Lab 06/15/17  0527 06/14/17  1750 06/14/17  0551 06/13/17  0516  06/12/17  1250   WBC  --   --  5.9 6.4  --  8.3   HGB 7.9* 9.0* 7.6* 8.0*  --  8.7*   MCV  --   --  80 81  --  81   PLT  --   --  232 237  --  253   INR 3.82*  --  3.29* 2.62*  --  2.44*     --  143 148*  < > 147*   POTASSIUM 4.3  --  4.1 4.0  --  4.2   CHLORIDE 105  --  107 113*  --  114*   CO2 24  --  26 24  --  24   BUN 89*  --  74* 63*  --  60*   CR 3.73*  --  3.36* 2.78*  < > 2.49*   ANIONGAP 13  --  10 11  --  9   MALICK 7.3*  --  7.6* 7.7*  --  7.6*   *  --  97 75  --  131*   ALBUMIN  --   --   --   --   --  3.1*   PROTTOTAL  --   --   --   --   --  6.1*   BILITOTAL  --   --   --   --   --  0.3   ALKPHOS  --   --   --   --   --  177*   ALT  --   --   --   --   --  47   AST  --   --   --   --   --  42   < > = values in this interval not displayed.[JK1.1]    Stool negative for occult blood  Some iron 26 with iron saturation 11% both of which are low, ferritin 87    Urine protein 1.3 g/g creatinine[JK1.2]       Revision History        User Key Date/Time User Provider Type Action    > JK1.2 6/15/2017  1:25 PM Arash Silva MD Physician Sign     JK1.1 6/15/2017 12:37 PM Arash Silva MD Physician             ED Notes signed by Billy CasillasProvider at 6/15/2017  5:01 AM      Author:  Vinny Non-Provider Service:  (none) Author Type:  (none)    Filed:  6/15/2017  5:01 AM Date of Service:  6/13/2017  9:32 AM Creation Time:  6/15/2017  5:01 AM    Status:  Signed :  Vinny Non-Provider     Scan on 6/15/2017  5:01 AM by Vinny Non-Provider : [facility] AMBULANCE EMS RECORD - [facility] HANDOFF 1          Revision History        User  "Key Date/Time User Provider Type Action    > [N/A] 6/15/2017  5:01 AM Scan, Non-Provider (none) Sign            Progress Notes by Mike Wong MD at 6/14/2017  6:58 PM     Author:  Mike Wong MD Service:  Hospitalist Author Type:  Physician    Filed:  6/14/2017  6:59 PM Date of Service:  6/14/2017  6:58 PM Creation Time:  6/14/2017  6:58 PM    Status:  Signed :  Mike Wong MD (Physician)         Suspect this am hgb result was likely erroneous since the repeat this evening is consistent with her more recent values and her iron studies are consistent with anemia of chronic disease which would be consistent with her CKD.  Will stop every 12 hour hgb checks but check again tomorrow morning.  electronically signed by Mike Wong M.D.[DF1.1]       Revision History        User Key Date/Time User Provider Type Action    > DF1.1 6/14/2017  6:59 PM Mike Wong MD Physician Sign            Progress Notes by Karma Collier RD at 6/14/2017  4:20 PM     Author:  Karma Collier RD Service:  Nutrition Author Type:  Registered Dietitian    Filed:  6/14/2017  4:27 PM Date of Service:  6/14/2017  4:20 PM Creation Time:  6/14/2017  4:20 PM    Status:  Signed :  Karma Collier RD (Registered Dietitian)         Reason for Assessment:  Nutrition education regarding Type 2 Diabetes Nutrition   Diet History:  Pt reports having some education regarding nutrition and diabetes when she was first diagnosed, but did not recall much of what she learned. She said \"maybe it'll come back to me when I'm not in here too.\" She had just finished eating her lunch meal and ate almost everything. Pt had several questions for writer during conversation.   Nutrition Diagnosis:  Food- and nutrition-related knowledge deficit r/t little previous knowledge of Type 2 Diabetes Diet.   Interventions:  Provided instruction on Type 2 Diabetes Nutrition; carb counting, complex and simple carbs; " 15gm = 1 carb choice/serving; recommended 3-5 carb choices at each meal and 1-2 carb choices for snacks; explained importance of consistent carb intake throughout day to regular blood sugar levels  Provided handouts Type 2 Diabetes Nutrition Therapy  Goals:   Patient will verbalize understanding of Type 2 Diabetes Nutrition   Follow-up:    Patient to ask any further nutrition-related questions before discharge.  In addition, pt may request outpatient RD appointment.    Karma Collier RD, LD  Clinical Dietitian  [phone][HW1.1]         Revision History        User Key Date/Time User Provider Type Action    > HW1.1 6/14/2017  4:27 PM Karma Collier RD Registered Dietitian Sign            Progress Notes by Arash Silva MD at 6/14/2017  2:38 PM     Author:  Arash Silva MD Service:  Hospitalist Author Type:  Physician    Filed:  6/14/2017  3:50 PM Date of Service:  6/14/2017  2:38 PM Creation Time:  6/14/2017  2:38 PM    Status:  Signed :  Arash Silva MD (Physician)         [facility]    Hospitalist Progress Note    Date of Service (when I saw the patient): 06/14/2017    Assessment & Plan   Kathryn Banks is a 77 year old female who was admitted on 6/12/2017[JK1.1] with concern for volume overload due to worsening acute renal failure superimposed upon chronic kidney disease.  Signs of volume overload are starting to improve with aggressive diuresis including oral metolazone and continuous Lasix infusion with good urine output on that regimen.  However, renal function has worsened, she has become more uremic, and fractional excretion of sodium is significantly elevated consistent with acute tubular necrosis.  This raises concern for high risk of rapidly progressive worsening renal function and associated potential complications of acute renal failure including volume overload, hyperkalemia, acidosis, and uremia.  Additionally, she has become progressively  more anemic during this hospital stay and does report that she had rectal bleeding at the time of admission.  Although this is grossly subsided, cause for rectal bleeding remains inapparent and ongoing GI bleeding cannot be completely excluded.  Furthermore, she is receiving therapeutic anticoagulation which increases her risk for recurrent or worsening bleeding.  Worsening anemia could exacerbate acute renal failure, and GI bleeding could exacerbate uremia.  She had initial atrial flutter in the emergency room followed by atrial fibrillation for about 24 hours, but has now been in normal sinus rhythm for about 24 hours.  Blood pressure has been stable so far[JK1.2], and CAD, chronic heart failure and pulmonary hypertension appear to be stable[JK1.3].[JK1.2]  Glycemic control associated with type 2 diabetes has been adequate so far.  Chronic memory problems are suspected, but cognitive function is stable as compared with evaluation at the time of her hospitalization 1-2 weeks ago, and an acute delirium or encephalopathy is not suspected clinically at this time.[JK1.3]    Principal Problem:    Acute on chronic renal failure (H)  Active Problems:    Anemia    Type 2 diabetes mellitus with other circulatory complications (H)    Pulmonary hypertension (H)    Atrial flutter (H) - present 6/12    Memory loss    Chronic atrial fibrillation (H) on 6/12-6/13    Blood in stool    Restless leg syndrome    CAD - NSTEMI, CABG x 5 2007, angio in 2013 with patent grafts other than occluded graft to PL    Non morbid obesity, unspecified obesity type    Essential hypertension with goal blood pressure less than 140/90[JK1.4]    Continue Lasix infusion and metolazone  Continue Cee catheter to monitor urine output closely  Follow renal function closely, check urine protein  Follow serial hemoglobin levels and iron levels and check stool for occult blood  send blood for type and screen and reconsider blood transfusion if anemia  continues to worsen particularly for hemoglobin less than 7, briefly discussed possible need for blood transfusion with the patient today who appeared to be agreeable if it becomes medically necessary  Continue oral iron for now but discussed possible administration of IV iron therapy depending upon results of testing for iron deficiency  Discontinue telemetry  Adjust warfarin dosing per recommendations from pharmacy  Discussed discharge planning including possible discharge to transitional care unit for anticipated short-term rehabilitation  Discussed memory problems with occupational therapy who performed her cognitive evaluation, recommend additional cognitive evaluation as an outpatient after discharge[JK1.3]    DVT Prophylaxis:[JK1.1] Warfarin[JK1.3]  Code Status: Full Code    Disposition: Expected discharge in[JK1.1] 2-3[JK1.3] days.    Arash Silva[JK1.1] MD[JK1.3]    Interval History[JK1.1]   She says she feels fine.  She has no complaints today.  She denies dyspnea although has not been particularly active.  She has not noticed much swelling.  She has been afebrile.  Heart rate was tachycardic yesterday but has returned to the normal range overnight and today.  Blood pressure has been normal.  Oxygenation has been normal.  Oral intake has been adequate, and she has been asking for specific information about a diabetic diet indicating that she has never received any information about a diabetic diet although she has been treated for diabetes for many years.  Her reliability as an historian is questionable in this regard.  Today she says that she did have quite a bit of bleeding from her bottom when she was admitted to the hospital.  That blood was red and was mixed in with the stool.  She did not have anorectal or abdominal pain.  She attributed the bleeding to hemorrhoids.  That bleeding has subsequently stopped.  She has had ongoing loose stools without obvious blood and denies melena.  Urine output has  been good.[JK1.3]    -Data reviewed today: I reviewed all new labs and imaging results over the last 24 hours. I personally reviewed[JK1.1] her ER cardiac tracing that demonstrated atrial flutter with flutter ways obvious in lead V1[JK1.3].[JK1.1]  I also reviewed her telemetry strips throughout her hospital stay.  Atrial flutter appeared to have subsided after initial presence in the emergency room, but she had persistent atrial fibrillation June 12 to June 13.  However, as of mid afternoon on June 13, she appeared to have returned to sinus rhythm and today remains in sinus rhythm.[JK1.3]    Physical Exam   Temp: 97.9  F (36.6  C) Temp src: Oral BP: 109/60 Pulse: 61 Heart Rate: 62 Resp: 16 SpO2: 95 % O2 Device: None (Room air)    Vitals:    06/12/17 2007 06/13/17 0439 06/14/17 0330   Weight: 81.5 kg (179 lb 10.8 oz) 80 kg (176 lb 5.9 oz) 79.4 kg (175 lb 0.7 oz)[JK1.1]     Old records were reviewed for comparison.  On June 6 which was the day of hospital discharge previously, her weight was 168 pounds and considered to be her dry weight at that time.[JK1.3]    Vital Signs with Ranges  Temp:  [96.5  F (35.8  C)-98.5  F (36.9  C)] 97.9  F (36.6  C)  Pulse:  [61-76] 61  Heart Rate:  [62-76] 62  Resp:  [16-20] 16  BP: (109-121)/(59-67) 109/60  SpO2:  [95 %-97 %] 95 %  I/O last 3 completed shifts:  In: 1095.9 [P.O.:860; I.V.:235.9]  Out: 4510 [Urine:4510]    Constitutional:[JK1.1] No acute distress sitting in a chair[JK1.3]  Respiratory:[JK1.1] Normal respiratory effort, good air movement, few inspiratory crackles at the lung bases, no wheezes[JK1.3]  Cardiovascular:[JK1.1] Regular rate and rhythm, no gallop or murmur, good radial pulse, brisk capillary refill[JK1.3]  GI:[JK1.1] Nondistended abdomen, soft without abdominal tenderness[JK1.3]  Skin/Integumen:[JK1.1] No rashes[JK1.3]  Neuro:[JK1.1] Alert and maintains wakefulness and attention, does appear to have memory problems recalling distant information, able to  follow simple instructions, speech is understandable[JK1.3]  Other:[JK1.1] Mild pitting edema evident in the lower extremity bilaterally[JK1.3]    Medications     - MEDICATION INSTRUCTIONS -       furosemide (LASIX) infusion ADULT 10 mg/hr (06/14/17 0224)     Warfarin Therapy Reminder         warfarin-No DOSE today  1 each Does not apply no dose today (warfarin)     metoprolol  50 mg Oral BID     gabapentin (NEURONTIN) capsule 300 mg  300 mg Oral Daily     ferrous sulfate  325 mg Oral Daily with breakfast     isosorbide mononitrate  30 mg Oral Daily     pravastatin (PRAVACHOL) tablet 40 mg  40 mg Oral Daily     insulin aspart  1-7 Units Subcutaneous TID AC     insulin aspart  1-5 Units Subcutaneous At Bedtime     metolazone  5 mg Oral Daily       Data   Data reviewed today:  I personally reviewed[JK1.1] telemetry[JK1.3].    Recent Labs  Lab 06/14/17  0551 06/13/17  0516 06/12/17  2141 06/12/17  1250   WBC 5.9 6.4  --  8.3   HGB 7.6* 8.0*  --  8.7*   MCV 80 81  --  81    237  --  253   INR 3.29* 2.62*  --  2.44*    148* 148* 147*   POTASSIUM 4.1 4.0  --  4.2   CHLORIDE 107 113*  --  114*   CO2 26 24  --  24   BUN 74* 63*  --  60*   CR 3.36* 2.78* 2.61* 2.49*   ANIONGAP 10 11  --  9   MALICK 7.6* 7.7*  --  7.6*   GLC 97 75  --  131*   ALBUMIN  --   --   --  3.1*   PROTTOTAL  --   --   --  6.1*   BILITOTAL  --   --   --  0.3   ALKPHOS  --   --   --  177*   ALT  --   --   --  47   AST  --   --   --  42[JK1.1]     Fractional excretion of sodium this hospital stay was 14.3%.  Urine sodium was 115.  Urinalysis demonstrated moderate blood.    Old records are reviewed with respect to testing for iron deficiency.  On August 20, 2012, serum iron was low at 32 with iron saturation 11%.  It does not appear as though iron studies have been repeated since that time.[JK1.3]       Revision History        User Key Date/Time User Provider Type Action    > JK1.3 6/14/2017  3:50 PM Arash Silva MD Physician Sign     JK1.4  6/14/2017  3:38 PM Arash Silva MD Physician      JK1.2 6/14/2017  3:34 PM Arash Silva MD Physician      JK1.1 6/14/2017  2:38 PM Arash Silva MD Physician             Progress Notes signed by Scan, Provider at 6/14/2017  3:04 PM      Author:  Scan, Provider Service:  (none) Author Type:  Physician    Filed:  6/14/2017  3:04 PM Date of Service:  6/13/2017  9:18 AM Creation Time:  6/14/2017  3:04 PM    Status:  Signed :  Scan, Provider (Physician)     Scan on 6/14/2017  3:04 PM by Scan, Provider : PROGRESS NOTES 1          Revision History        User Key Date/Time User Provider Type Action    > [N/A] 6/14/2017  3:04 PM Scan, Provider Physician Sign            ED Notes signed by Scan, Provider at 6/14/2017  2:29 PM      Author:  Scan, Provider Service:  (none) Author Type:  Physician    Filed:  6/14/2017  2:29 PM Date of Service:  6/13/2017  9:12 AM Creation Time:  6/14/2017  2:29 PM    Status:  Signed :  Scan, Provider (Physician)     Scan on 6/14/2017  2:29 PM by Scan, Provider : EMERGENCY DEPARTMENT NURSING DOWNTIME 1          Revision History        User Key Date/Time User Provider Type Action    > [N/A] 6/14/2017  2:29 PM Scan, Provider Physician Sign            ED Notes signed by Scan, Provider at 6/14/2017  2:29 PM      Author:  Scan, Provider Service:  (none) Author Type:  Physician    Filed:  6/14/2017  2:29 PM Date of Service:  6/13/2017  9:12 AM Creation Time:  6/14/2017  2:29 PM    Status:  Signed :  Scan, Provider (Physician)     Scan on 6/14/2017  2:29 PM by Scan, Provider : EMERGENCY DEPARTMENT PHYSICIANS  DOWNTIME RECORD 1          Revision History        User Key Date/Time User Provider Type Action    > [N/A] 6/14/2017  2:29 PM Scan, Provider Physician Sign            Progress Notes by Viktoria Nicole OT at 6/13/2017  4:09 PM     Author:  Viktoria Nicole OT Service:  (none) Author Type:  Occupational Therapist    Filed:  6/13/2017  4:09 PM Date of Service:  6/13/2017   "4:09 PM Creation Time:  6/13/2017  4:09 PM    Status:  Signed :  Viktoria Nicole OT (Occupational Therapist)          06/13/17 2073   Quick Adds   Type of Visit Initial Occupational Therapy Evaluation   Living Environment   Lives With spouse;grandchild(zoe)   Living Arrangements house   Home Accessibility grab bars present (bathtub);grab bars present (toilet)   Transportation Available car;family or friend will provide   Living Environment Comment Patient lives in a house with her spouse and then her 18 year old granddaughter moved in this past week to help her out.  Patient also reports that her niece \"comes and goes\" and it willing to help her out as well.  Patient is receiving [facility] OT, PT and bath aide services prior hospital admit. Weighs self-everyday per, her report. walk in shower with built in seat, toilet safety bars. hand held shower head. Patient has shoe horn/long, and sock aide.   Self-Care   Dominant Hand right   Functional Level Prior   Ambulation 1-->assistive equipment   Transferring 1-->assistive equipment   Toileting 1-->assistive equipment   Bathing 3-->assistive equipment and person   Dressing 0-->independent   Eating 0-->independent   Communication 0-->understands/communicates without difficulty   Swallowing 0-->swallows foods/liquids without difficulty   Cognition 1 - attention or memory deficits   General Information   Onset of Illness/Injury or Date of Surgery - Date 06/12/17   Referring Physician Dr. Wong    Patient/Family Goals Statement Patient would like to get a medication reminder with an alarm and she feels that she could safely return home and manage her medical conditions   Additional Occupational Profile Info/Pertinent History of Current Problem Patient is a 77 year old female experiencing increased weakness and weight gain, excessive fluid retention limiting her ability to perform daily living tasks/activities, with increase SOB with activity.  This is her second " hospitalization in the past 2 weeks.  She had been receiving per her report; home health care services including: bath aide, nursing, PT and OT.  She has meals on wheels 5 days per week and a cleaning services 2x/month.  Medical history: CAD, DM, HTN and renal failure per EPIC chart review.  Previous hip fracture, OA and CTS in medical history; may limit her functional mobility and self-cares.   Precautions/Limitations fall precautions   Cognitive Status Examination   Orientation orientation to person, place and time   Level of Consciousness alert   Able to Follow Commands success, 2-step commands   Personal Safety (Cognitive) at risk behaviors demonstrated;decreased awareness, need for assist;decreased awareness, need for safety;decreased insight to deficits   Memory impaired   Attention Distractible during evaluation   Executive Function Self awareness/monitoring impaired;Planning ability impaired   Cognitive Comment SLUMS score 22/30 indicating possible cognitive deficits.  When provided education to patient on score and how that relates to safety at home patient was asking about what questions she got wrong and not appearing to be focused on education.     Visual Perception   Visual Perception Wears glasses   Range of Motion (ROM)   ROM Comment WFL BUE   Strength   Strength Comments WFL BUE   Coordination   Upper Extremity Coordination No deficits were identified   Bathing   Level of [place] moderate assist (50% patients effort)   Physical Assist/Nonphysical Assist 1 person assist;supervision;verbal cues;set-up required   Upper Body Dressing   Level of [place]: Dress Upper Body stand-by assist   Physical Assist/Nonphysical Assist: Dress Upper Body set-up required   Lower Body Dressing   Level of [place]: Dress Lower Body minimum assist (75% patients effort)   Physical Assist/Nonphysical Assist: Dress Lower Body verbal cues;supervision;set-up required;1 person assist   Toileting   Level of  "[place]: Toilet minimum assist (75% patients effort)   Physical Assist/Nonphysical Assist: Toilet verbal cues;supervision;1 person assist   Grooming   Level of [place]: Grooming contact guard   Physical Assist/Nonphysical Assist: Grooming supervision;verbal cues   Eating/Self Feeding   Level of [place]: Eating independent   Instrumental Activities of Daily Living (IADL)   Previous Responsibilities meal prep;housekeeping;laundry;driving;medication management   Activities of Daily Living Analysis   Impairments Contributing to Impaired Activities of Daily Living cognition impaired   General Therapy Interventions   Planned Therapy Interventions ADL retraining;home program guidelines   Clinical Impression   Criteria for Skilled Therapeutic Interventions Met yes, treatment indicated   OT Diagnosis Impaired cognition, decreased strength, endurance and safety for functional independence with BADL/IADLs.   Influenced by the following impairments edema, decreased strength/endurance   Assessment of Occupational Performance 5 or more Performance Deficits   Identified Performance Deficits dressing, bathing, functional transfers, bed mobility, meal prep/clean up, house mgt. driving   Clinical Decision Making (Complexity) High complexity   Therapy Frequency daily   Predicted Duration of Therapy Intervention (days/wks) 2 days   Anticipated Discharge Disposition Transitional Care Facility   Risks and Benefits of Treatment have been explained. Yes   Patient, Family & other staff in agreement with plan of care Yes   [facility] TM \"6 Clicks\"   2016, Trustees of [facility], under license to [employer].  All rights reserved.   6 Clicks Short Forms Daily Activity Inpatient Short Form   [facility]  \"6 Clicks\" Daily Activity Inpatient Short Form   1. Putting on and taking off regular lower body clothing? 3 - A Little   2. Bathing (including washing, rinsing, drying)? 3 - A Little   3. " Toileting, which includes using toilet, bedpan or urinal? 3 - A Little   4. Putting on and taking off regular upper body clothing? 4 - None   5. Taking care of personal grooming such as brushing teeth? 3 - A Little   6. Eating meals? 4 - None   Daily Activity Raw Score (Score out of 24.Lower scores equate to lower levels of function) 20   Total Evaluation Time   Total Evaluation Time (Minutes) 20     TODD RollinsR/L  [facility]  [phone][SC1.1]         Revision History        User Key Date/Time User Provider Type Action    > SC1.1 6/13/2017  4:09 PM Viktoria Nicole, OT Occupational Therapist Sign            Progress Notes by Mauricio Hernandez at 6/13/2017  2:40 PM     Author:  Mauricio Hernandez Service:  Spiritual Health Author Type:      Filed:  6/13/2017  2:41 PM Date of Service:  6/13/2017  2:40 PM Creation Time:  6/13/2017  2:40 PM    Status:  Signed :  Mauricio Hernandez ()         [facility] SERVICES  SPIRITUAL ASSESSMENT Progress Note  [facility]      (Initial Visit)  (Pt known to  from previous hospitalization.)   provided communion to pt.   is available for pt/family needs.    Mauricio Hernandez M.Div., [facility]  Staff   Office tel: [phone][JV1.1]       Revision History        User Key Date/Time User Provider Type Action    > JV1.1 6/13/2017  2:41 PM Mauricio Hernandez  Sign            Progress Notes by Mike Wong MD at 6/13/2017  6:09 AM     Author:  Mike Wong MD Service:  Hospitalist Author Type:  Physician    Filed:  6/13/2017  6:15 AM Date of Service:  6/13/2017  6:09 AM Creation Time:  6/13/2017  6:09 AM    Status:  Signed :  Mike Wong MD (Physician)         [facility]    Hospitalist Progress Note    Date of Service (when I saw the patient): 06/13/2017    Assessment & Plan   Kathryn Banks is a 77 year old female who was  "admitted on 6/12/2017.     Active Problems:    Acute on chronic renal failure (H)    Assessment: has had an excellent response to IV lasix drip and metolazone and has not required albumin thus far.  Her creatinine has increased slightly.  Nephrology was contacted through the ED and they did not recommend dialysis but did recommend outpatient consult.  Her FENA is once again elevated raising concerns for ATN.     Plan: continue IV lasix and metolazone, follow renal function and intake and output closely      Type 2 diabetes mellitus with other circulatory complications (H)    Assessment: BS controlled    Plan: no change      Atrial flutter (H)    Assessment: has now been ablated    Plan: no intervention      Memory loss    Assessment: seems to be significant and was noted during her last hospital stay as well.  She remembers having cognitive evaluations previously and said she \"passed.\"  However she is asking some of the same questions repeatedly and does not seem to have any understanding of her disease process despite discussing previously    Plan: OT[DF1.1] eval today[DF1.2]      Chronic atrial fibrillation (H)    Assessment:[DF1.1] EKG confirms a fib[DF1.2]    Plan:[DF1.1] pharmacy managing coumadin.  Will increase metoprolol slightly to bring her rate down[DF1.2]      Restless leg syndrome    Assessment:[DF1.1] chronic and mirapex being held[DF1.2]    Plan:[DF1.1] no change[DF1.2]      CAD - NSTEMI, CABG x 5 2007, angio in 2013 with patent grafts other than occluded graft to PL    Assessment:[DF1.1] chronic and controlled[DF1.2]    Plan:[DF1.1] no change[DF1.2]      Anemia    Assessment:[DF1.1] appears stable and suspected to be due to CKD[DF1.2]    Plan:[DF1.1] follow[DF1.2]      Non morbid obesity, unspecified obesity type    Assessment:[DF1.1] chronic[DF1.2]    Plan:[DF1.1] no acute intervention[DF1.2]      Essential hypertension with goal blood pressure less than 140/90    Assessment:[DF1.1] BP " controlled[DF1.2]    Plan:[DF1.1] no change[DF1.2]      Pulmonary hypertension (H)    Assessment:[DF1.1] chronic[DF1.2]    Plan:[DF1.1] no acute intervention[DF1.2]    DVT Prophylaxis:[DF1.1] Warfarin[DF1.2]  Code Status: Full Code    Disposition: Expected discharge in[DF1.1] 1[DF1.2] days once[DF1.1] fluid continues to come off and output exceeds input[DF1.2].    Mike Wong MD    Interval History[DF1.1]   Continues to have no complaints.  Has multiple questions and doesn't seem to understand and/or repeats questions[DF1.2]    -Data reviewed today: I reviewed all new labs and imaging results over the last 24 hours. I personally reviewed[DF1.1] no images or EKG's today[DF1.2].    Physical Exam   Temp: 97  F (36.1  C) Temp src: Oral BP: 117/74 Pulse: 114 Heart Rate: 114 Resp: 18 SpO2: 96 % O2 Device: None (Room air)    Vitals:    06/12/17 2007 06/13/17 0439   Weight: 81.5 kg (179 lb 10.8 oz) 80 kg (176 lb 5.9 oz)     Vital Signs with Ranges  Temp:  [97  F (36.1  C)-97.8  F (36.6  C)] 97  F (36.1  C)  Pulse:  [104-114] 114  Heart Rate:  [] 114  Resp:  [18-20] 18  BP: (103-162)/(65-99) 117/74  SpO2:  [87 %-99 %] 96 %  I/O last 3 completed shifts:  In: -   Out: 1250 [Urine:1250]    Lungs:  CTA auscultation throughout        Cardiovascular:   RRR with no murmur, rub or gallop     Abdomen:   +BS.  Soft, NT[DF1.1]     Less edema in her legs and now is minimal edema throughout her trunk, arms and legs[DF1.2]    Medications     - MEDICATION INSTRUCTIONS -       furosemide (LASIX) infusion ADULT 10 mg/hr (06/12/17 2126)     Warfarin Therapy Reminder         metoprolol  50 mg Oral BID     ferrous sulfate  325 mg Oral Daily with breakfast     gabapentin (NEURONTIN) capsule 300 mg  300 mg Oral BID     isosorbide mononitrate  30 mg Oral Daily     pravastatin (PRAVACHOL) tablet 40 mg  40 mg Oral Daily     insulin aspart  1-7 Units Subcutaneous TID AC     insulin aspart  1-5 Units Subcutaneous At Bedtime      metolazone  5 mg Oral Daily       Data     Recent Labs  Lab 06/13/17  0516 06/12/17  2141 06/12/17  1250 06/09/17 06/07/17  0740 06/06/17  0610   WBC 6.4  --  8.3  --   --  8.2   HGB 8.0*  --  8.7*  --   --  9.7*   MCV 81  --  81  --   --  79     --  253  --   --  298   INR 2.62*  --  2.44* 3.1 3.4* 2.48*   * 148* 147*  --  142 144   POTASSIUM 4.0  --  4.2  --  3.9 4.4   CHLORIDE 113*  --  114*  --  102 101   CO2 24  --  24  --  33* 35*   BUN 63*  --  60*  --  56* 63*   CR 2.78* 2.61* 2.49*  --  2.39* 2.76*   ANIONGAP 11  --  9  --  7 8   MALICK 7.7*  --  7.6*  --  8.3* 8.0*   GLC 75  --  131*  --  137* 66*   ALBUMIN  --   --  3.1*  --   --   --    PROTTOTAL  --   --  6.1*  --   --   --    BILITOTAL  --   --  0.3  --   --   --    ALKPHOS  --   --  177*  --   --   --    ALT  --   --  47  --   --   --    AST  --   --  42  --   --   --        Recent Results (from the past 24 hour(s))   XR Chest Port 1 View    Narrative    PORTABLE CHEST ONE VIEW   6/12/2017 4:38 PM     HISTORY: Bilateral lower extremity edema.    COMPARISON: Chest x-rays dated 5/31/2017.    FINDINGS: Cardiac silhouette remains enlarged even given technique.  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 Lungs are  otherwise grossly clear. No pneumothorax or significant pleural fluid  collection. No fracture.      Impression    IMPRESSION: Cardiomegaly is again noted. No other evidence of acute  cardiopulmonary disease is seen.    I discussed these findings with Dr. Morales on 6/12/2017 at  approximately 4:56 PM.[DF1.1]                Revision History        User Key Date/Time User Provider Type Action    > DF1.2 6/13/2017  6:15 AM Mike Wong MD Physician Sign     DF1.1 6/13/2017  6:09 AM Mike Wong MD Physician                   Procedure Notes     No notes of this type exist for this encounter.         Progress Notes - Therapies (Notes from 06/13/17 through 06/16/17)      Progress Notes by Viktoria Nicole, OT  "at 6/13/2017  4:09 PM     Author:  Viktoria Nicole OT Service:  (none) Author Type:  Occupational Therapist    Filed:  6/13/2017  4:09 PM Date of Service:  6/13/2017  4:09 PM Creation Time:  6/13/2017  4:09 PM    Status:  Signed :  Viktoria Nicole OT (Occupational Therapist)          06/13/17 0932   Quick Adds   Type of Visit Initial Occupational Therapy Evaluation   Living Environment   Lives With spouse;grandchild(zoe)   Living Arrangements house   Home Accessibility grab bars present (bathtub);grab bars present (toilet)   Transportation Available car;family or friend will provide   Living Environment Comment Patient lives in a house with her spouse and then her 18 year old granddaughter moved in this past week to help her out.  Patient also reports that her niece \"comes and goes\" and it willing to help her out as well.  Patient is receiving [facility] OT, PT and bath aide services prior hospital admit. Weighs self-everyday per, her report. walk in shower with built in seat, toilet safety bars. hand held shower head. Patient has shoe horn/long, and sock aide.   Self-Care   Dominant Hand right   Functional Level Prior   Ambulation 1-->assistive equipment   Transferring 1-->assistive equipment   Toileting 1-->assistive equipment   Bathing 3-->assistive equipment and person   Dressing 0-->independent   Eating 0-->independent   Communication 0-->understands/communicates without difficulty   Swallowing 0-->swallows foods/liquids without difficulty   Cognition 1 - attention or memory deficits   General Information   Onset of Illness/Injury or Date of Surgery - Date 06/12/17   Referring Physician Dr. Wong    Patient/Family Goals Statement Patient would like to get a medication reminder with an alarm and she feels that she could safely return home and manage her medical conditions   Additional Occupational Profile Info/Pertinent History of Current Problem Patient is a 77 year old female experiencing increased " weakness and weight gain, excessive fluid retention limiting her ability to perform daily living tasks/activities, with increase SOB with activity.  This is her second hospitalization in the past 2 weeks.  She had been receiving per her report; home health care services including: bath aide, nursing, PT and OT.  She has meals on wheels 5 days per week and a cleaning services 2x/month.  Medical history: CAD, DM, HTN and renal failure per EPIC chart review.  Previous hip fracture, OA and CTS in medical history; may limit her functional mobility and self-cares.   Precautions/Limitations fall precautions   Cognitive Status Examination   Orientation orientation to person, place and time   Level of Consciousness alert   Able to Follow Commands success, 2-step commands   Personal Safety (Cognitive) at risk behaviors demonstrated;decreased awareness, need for assist;decreased awareness, need for safety;decreased insight to deficits   Memory impaired   Attention Distractible during evaluation   Executive Function Self awareness/monitoring impaired;Planning ability impaired   Cognitive Comment SLUMS score 22/30 indicating possible cognitive deficits.  When provided education to patient on score and how that relates to safety at home patient was asking about what questions she got wrong and not appearing to be focused on education.     Visual Perception   Visual Perception Wears glasses   Range of Motion (ROM)   ROM Comment WFL BUE   Strength   Strength Comments WFL BUE   Coordination   Upper Extremity Coordination No deficits were identified   Bathing   Level of [place] moderate assist (50% patients effort)   Physical Assist/Nonphysical Assist 1 person assist;supervision;verbal cues;set-up required   Upper Body Dressing   Level of [place]: Dress Upper Body stand-by assist   Physical Assist/Nonphysical Assist: Dress Upper Body set-up required   Lower Body Dressing   Level of [place]: Dress Lower Body minimum  "assist (75% patients effort)   Physical Assist/Nonphysical Assist: Dress Lower Body verbal cues;supervision;set-up required;1 person assist   Toileting   Level of [place]: Toilet minimum assist (75% patients effort)   Physical Assist/Nonphysical Assist: Toilet verbal cues;supervision;1 person assist   Grooming   Level of [place]: Grooming contact guard   Physical Assist/Nonphysical Assist: Grooming supervision;verbal cues   Eating/Self Feeding   Level of [place]: Eating independent   Instrumental Activities of Daily Living (IADL)   Previous Responsibilities meal prep;housekeeping;laundry;driving;medication management   Activities of Daily Living Analysis   Impairments Contributing to Impaired Activities of Daily Living cognition impaired   General Therapy Interventions   Planned Therapy Interventions ADL retraining;home program guidelines   Clinical Impression   Criteria for Skilled Therapeutic Interventions Met yes, treatment indicated   OT Diagnosis Impaired cognition, decreased strength, endurance and safety for functional independence with BADL/IADLs.   Influenced by the following impairments edema, decreased strength/endurance   Assessment of Occupational Performance 5 or more Performance Deficits   Identified Performance Deficits dressing, bathing, functional transfers, bed mobility, meal prep/clean up, house mgt. driving   Clinical Decision Making (Complexity) High complexity   Therapy Frequency daily   Predicted Duration of Therapy Intervention (days/wks) 2 days   Anticipated Discharge Disposition Transitional Care Facility   Risks and Benefits of Treatment have been explained. Yes   Patient, Family & other staff in agreement with plan of care Yes   [facility] TM \"6 Clicks\"   2016, Trustees of [facility], under license to [employer].  All rights reserved.   6 Clicks Short Forms Daily Activity Inpatient Short Form   [facility]  \"6 Clicks\" Daily Activity " Inpatient Short Form   1. Putting on and taking off regular lower body clothing? 3 - A Little   2. Bathing (including washing, rinsing, drying)? 3 - A Little   3. Toileting, which includes using toilet, bedpan or urinal? 3 - A Little   4. Putting on and taking off regular upper body clothing? 4 - None   5. Taking care of personal grooming such as brushing teeth? 3 - A Little   6. Eating meals? 4 - None   Daily Activity Raw Score (Score out of 24.Lower scores equate to lower levels of function) 20   Total Evaluation Time   Total Evaluation Time (Minutes) 20     SHAYY Rollins/L  [facility]  [phone][SC1.1]         Revision History        User Key Date/Time User Provider Type Action    > SC1.1 6/13/2017  4:09 PM Viktoria Nicole OT Occupational Therapist Sign

## 2017-06-12 NOTE — TELEPHONE ENCOUNTER
Reason for Call:  Other call back    Detailed comments: 12 lb weight gain. Current weight is 180 lbs. Please call home care nurse asap. Otherwise she is going to be sending her to the ED.     Phone Number Patient can be reached at: Other phone number:  [phone] Leona Ocampo Time: any    Can we leave a detailed message on this number? YES    Call taken on 6/12/2017 at 10:33 AM by Sienna Lafleur

## 2017-06-12 NOTE — IP AVS SNAPSHOT
"          [address] SURGICAL: [phone]                                              INTERAGENCY TRANSFER FORM - LAB / IMAGING / EKG / EMG RESULTS   2017                    Hospital Admission Date: 2017  MARI HERNANDEZ   : 1940  Sex: Female        Attending Provider: Arash Silva MD     Allergies:  Ciprofloxacin, Oxycodone, Lisinopril, Penicillins, Sulfa Drugs    Infection:  None   Service:  HOSPITALIST    Ht:  1.626 m (5' 4\")   Wt:  73 kg (161 lb)   Admission Wt:  81.5 kg (179 lb 10.8 oz)    BMI:  27.64 kg/m 2   BSA:  1.82 m 2            Patient PCP Information     Provider PCP Type    Dheeraj Jj MD General         Lab Results - 3 Days      Basic metabolic panel [[id number]] (Abnormal)  Resulted: 17 0644, Result status: Final result    Ordering provider: Arash Silva MD  17 0000 Resulting lab: [facility]    Specimen Information    Type Source Collected On   Blood  17          Components       Value Reference Range Flag Lab   Sodium 143 133 - 144 mmol/L  [facility]   Potassium 4.4 3.4 - 5.3 mmol/L  [facility]   Chloride 107 94 - 109 mmol/L  [facility]   Carbon Dioxide 26 20 - 32 mmol/L  [facility]   Anion Gap 10 3 - 14 mmol/L  [facility]   Glucose 124 70 - 99 mg/dL H [facility]   Urea Nitrogen 94 7 - 30 mg/dL H [facility]   Creatinine 4.03 0.52 - 1.04 mg/dL H [facility]   GFR Estimate 11 >60 mL/min/1.7m2 L [facility]   Comment:  Non  GFR Calc   GFR Estimate If Black 13 >60 mL/min/1.7m2 L [facility]   Comment:  African American GFR Calc   Calcium 7.3 8.5 - 10.1 mg/dL L [facility]            INR [[id number]] (Abnormal)  Resulted: 17 0639, Result status: Final result    Ordering provider: Mike Wong MD  17 0000 Resulting lab: [facility]    Specimen Information    Type Source Collected On   Blood  17 0615          Components       Value Reference Range Flag Lab   INR 3.43 0.86 - 1.14 H " [facility]            Hemoglobin [[id number]] (Abnormal)  Resulted: 06/16/17 0633, Result status: Final result    Ordering provider: Arash Silva MD  06/16/17 0000 Resulting lab: [facility]    Specimen Information    Type Source Collected On   Blood  06/16/17 0615          Components       Value Reference Range Flag Lab   Hemoglobin 9.0 11.7 - 15.7 g/dL L [facility]            Basic metabolic panel [[id number]] (Abnormal)  Resulted: 06/15/17 0600, Result status: Final result    Ordering provider: Arash Silva MD  06/15/17 0000 Resulting lab: [facility]    Specimen Information    Type Source Collected On   Blood  06/15/17 0527          Components       Value Reference Range Flag Lab   Sodium 142 133 - 144 mmol/L  [facility]   Potassium 4.3 3.4 - 5.3 mmol/L  [facility]   Chloride 105 94 - 109 mmol/L  [facility]   Carbon Dioxide 24 20 - 32 mmol/L  [facility]   Anion Gap 13 3 - 14 mmol/L  [facility]   Glucose 150 70 - 99 mg/dL H [facility]   Urea Nitrogen 89 7 - 30 mg/dL H [facility]   Creatinine 3.73 0.52 - 1.04 mg/dL H [facility]   GFR Estimate 12 >60 mL/min/1.7m2 L [facility]   Comment:  Non  GFR Calc   GFR Estimate If Black 14 >60 mL/min/1.7m2 L [facility]   Comment:  African American GFR Calc   Calcium 7.3 8.5 - 10.1 mg/dL L [facility]            INR [[id number]] (Abnormal)  Resulted: 06/15/17 0555, Result status: Final result    Ordering provider: Mike Wong MD  06/15/17 0000 Resulting lab: [facility]    Specimen Information    Type Source Collected On   Blood  06/15/17 0527          Components       Value Reference Range Flag Lab   INR 3.82 0.86 - 1.14 H [facility]            Hemoglobin [[id number]] (Abnormal)  Resulted: 06/15/17 0550, Result status: Final result    Ordering provider: Mike Wong MD  06/15/17 0000 Resulting lab: [facility]    Specimen Information    Type Source Collected On   Blood  06/15/17 0527           Components       Value Reference Range Flag Lab   Hemoglobin 7.9 11.7 - 15.7 g/dL L [facility]            Glucose by meter [[id number]] (Abnormal)  Resulted: 06/14/17 2101, Result status: Final result    Ordering provider: Mike Wong MD  06/14/17 2056 Resulting lab: [facility] CARE TEST, GLUCOSE    Specimen Information    Type Source Collected On     06/14/17 2056          Components       Value Reference Range Flag Lab   Glucose 115 70 - 99 mg/dL H 170            Immunos occult blood [[id number]]  Resulted: 06/14/17 1946, Result status: Final result    Ordering provider: Arash Silva MD  06/14/17 1441 Resulting lab: [facility]    Specimen Information    Type Source Collected On   Stool Stool 06/14/17 1925          Components       Value Reference Range Flag Lab   Occult Blood Slide 1 Negative NEG  [facility]            Iron and iron binding capacity [[id number]] (Abnormal)  Resulted: 06/14/17 1833, Result status: Final result    Ordering provider: Arash Silva MD  06/14/17 1441 Resulting lab: [facility]    Specimen Information    Type Source Collected On   Blood  06/14/17 1750          Components       Value Reference Range Flag Lab   Iron 26 35 - 180 ug/dL L [facility]   Iron Binding Cap 229 240 - 430 ug/dL L [facility]   Iron Saturation Index 11 15 - 46 % L [facility]            Ferritin [[id number]]  Resulted: 06/14/17 1833, Result status: Final result    Ordering provider: Arash Silva MD  06/14/17 1441 Resulting lab: [facility]    Specimen Information    Type Source Collected On   Blood  06/14/17 1750          Components       Value Reference Range Flag Lab   Ferritin 87 8 - 252 ng/mL  [facility]            Hemoglobin [[id number]] (Abnormal)  Resulted: 06/14/17 1804, Result status: Final result    Ordering provider: Arash Silva MD  06/14/17 1441 Resulting lab: [facility]    Specimen Information    Type Source  Collected On   Blood  06/14/17 1750          Components       Value Reference Range Flag Lab   Hemoglobin 9.0 11.7 - 15.7 g/dL L [facility]            Glucose by meter [[id number]] (Abnormal)  Resulted: 06/14/17 1655, Result status: Final result    Ordering provider: Mike Wong MD  06/14/17 1651 Resulting lab: POINT OF CARE TEST, GLUCOSE    Specimen Information    Type Source Collected On     06/14/17 1651          Components       Value Reference Range Flag Lab   Glucose 182 70 - 99 mg/dL H 170            Protein  random urine [[id number]] (Abnormal)  Resulted: 06/14/17 1521, Result status: Final result    Ordering provider: Arash Silva MD  06/14/17 1441 Resulting lab: [facility]    Specimen Information    Type Source Collected On   Urine Urine catheter 06/14/17 1455          Components       Value Reference Range Flag Lab   Protein Random Urine 0.20 g/L  59   Protein Total Urine g/gr Creatinine 1.31 0 - 0.2 g/g Cr H 59            Creatinine urine calculation only [[id number]]  Resulted: 06/14/17 1521, Result status: Final result    Ordering provider: Arash Silva MD  06/14/17 1455 Resulting lab: [facility]    Specimen Information    Type Source Collected On     06/14/17 1455          Components       Value Reference Range Flag Lab   Creatinine Urine 15 mg/dL  59            Glucose by meter [[id number]] (Abnormal)  Resulted: 06/14/17 1157, Result status: Final result    Ordering provider: Mike Wong MD  06/14/17 1129 Resulting lab: POINT OF CARE TEST, GLUCOSE    Specimen Information    Type Source Collected On     06/14/17 1129          Components       Value Reference Range Flag Lab   Glucose 139 70 - 99 mg/dL H 170            Glucose by meter [[id number]] (Abnormal)  Resulted: 06/14/17 0736, Result status: Final result    Ordering provider: Mike Wong MD  06/14/17 0732 Resulting lab: POINT OF CARE TEST, GLUCOSE    Specimen Information    Type  Source Collected On     06/14/17 0732          Components       Value Reference Range Flag Lab   Glucose 101 70 - 99 mg/dL H 170            Basic metabolic panel [[id number]] (Abnormal)  Resulted: 06/14/17 0629, Result status: Final result    Ordering provider: Mike Wong MD  06/14/17 0000 Resulting lab: [facility]    Specimen Information    Type Source Collected On   Blood  06/14/17 0551          Components       Value Reference Range Flag Lab   Sodium 143 133 - 144 mmol/L  [facility]   Potassium 4.1 3.4 - 5.3 mmol/L  [facility]   Chloride 107 94 - 109 mmol/L  [facility]   Carbon Dioxide 26 20 - 32 mmol/L  [facility]   Anion Gap 10 3 - 14 mmol/L  [facility]   Glucose 97 70 - 99 mg/dL  [facility]   Urea Nitrogen 74 7 - 30 mg/dL H [facility]   Creatinine 3.36 0.52 - 1.04 mg/dL H [facility]   GFR Estimate 13 >60 mL/min/1.7m2 L [facility]   Comment:  Non  GFR Calc   GFR Estimate If Black 16 >60 mL/min/1.7m2 L [facility]   Comment:  African American GFR Calc   Calcium 7.6 8.5 - 10.1 mg/dL L [facility]            INR [[id number]] (Abnormal)  Resulted: 06/14/17 0620, Result status: Final result    Ordering provider: Mike Wong MD  06/14/17 0000 Resulting lab: [facility]    Specimen Information    Type Source Collected On   Blood  06/14/17 0551          Components       Value Reference Range Flag Lab   INR 3.29 0.86 - 1.14 H [facility]            CBC with platelets [[id number]] (Abnormal)  Resulted: 06/14/17 0613, Result status: Final result    Ordering provider: Mike Wong MD  06/14/17 0000 Resulting lab: [facility]    Specimen Information    Type Source Collected On   Blood  06/14/17 0551          Components       Value Reference Range Flag Lab   WBC 5.9 4.0 - 11.0 10e9/L  [facility]   RBC Count 3.28 3.8 - 5.2 10e12/L L [facility]   Hemoglobin 7.6 11.7 - 15.7 g/dL L [facility]   Hematocrit 26.2 35.0 - 47.0 % L [facility]   MCV 80 78 - 100 fl  [facility]    MCH 23.2 26.5 - 33.0 pg L [facility]   MCHC 29.0 31.5 - 36.5 g/dL L [facility]   RDW 16.6 10.0 - 15.0 % H [facility]   Platelet Count 232 150 - 450 10e9/L  [facility]            Glucose by meter [[id number]] (Abnormal)  Resulted: 06/14/17 0201, Result status: Final result    Ordering provider: Mike Wong MD  06/14/17 0156 Resulting lab: POINT OF CARE TEST, GLUCOSE    Specimen Information    Type Source Collected On     06/14/17 0156          Components       Value Reference Range Flag Lab   Glucose 144 70 - 99 mg/dL H 170            Glucose by meter [[id number]]  Resulted: 06/14/17 0021, Result status: Final result    Ordering provider: Mike Wong MD  06/14/17 0011 Resulting lab: POINT OF CARE TEST, GLUCOSE    Specimen Information    Type Source Collected On     06/14/17 0011          Components       Value Reference Range Flag Lab   Glucose 99 70 - 99 mg/dL  170            Methicillin resistant staph aureus cult [[id number]]  Resulted: 06/13/17 2259, Result status: Final result    Ordering provider: Arash Silva MD  06/12/17 2039 Resulting lab: [facility]    Specimen Information    Type Source Collected On   Nose  06/12/17 2045          Components       Value Reference Range Flag Lab   Specimen Description Nose   [facility]   Culture Micro No MRSA isolated   [facility]   Micro Report Status FINAL 06/13/2017   [facility]            Glucose by meter [[id number]] (Abnormal)  Resulted: 06/13/17 2056, Result status: Final result    Ordering provider: Mike Wong MD  06/13/17 1144 Resulting lab: POINT OF CARE TEST, GLUCOSE    Specimen Information    Type Source Collected On     06/13/17 1144          Components       Value Reference Range Flag Lab   Glucose 169 70 - 99 mg/dL H 170            Glucose by meter [[id number]] (Abnormal)  Resulted: 06/13/17 2056, Result status: Final result    Ordering provider: Mike Wong MD  06/13/17 2045 Resulting lab: POINT OF CARE TEST,  GLUCOSE    Specimen Information    Type Source Collected On     06/13/17 2045          Components       Value Reference Range Flag Lab   Glucose 170 70 - 99 mg/dL H 170            Glucose by meter [[id number]] (Abnormal)  Resulted: 06/13/17 2055, Result status: Final result    Ordering provider: Mike Wong MD  06/13/17 0734 Resulting lab: POINT OF CARE TEST, GLUCOSE    Specimen Information    Type Source Collected On     06/13/17 0734          Components       Value Reference Range Flag Lab   Glucose 122 70 - 99 mg/dL H 170            Glucose by meter [[id number]] (Abnormal)  Resulted: 06/13/17 1646, Result status: Final result    Ordering provider: Mike Wong MD  06/13/17 1638 Resulting lab: POINT OF CARE TEST, GLUCOSE    Specimen Information    Type Source Collected On     06/13/17 1638          Components       Value Reference Range Flag Lab   Glucose 133 70 - 99 mg/dL H 170            Basic metabolic panel [[id number]] (Abnormal)  Resulted: 06/13/17 0538, Result status: Final result    Ordering provider: Mike Wong MD  06/12/17 2300 Resulting lab: [facility]    Specimen Information    Type Source Collected On   Blood  06/13/17 0516          Components       Value Reference Range Flag Lab   Sodium 148 133 - 144 mmol/L H [facility]   Potassium 4.0 3.4 - 5.3 mmol/L  [facility]   Chloride 113 94 - 109 mmol/L H [facility]   Carbon Dioxide 24 20 - 32 mmol/L  [facility]   Anion Gap 11 3 - 14 mmol/L  [facility]   Glucose 75 70 - 99 mg/dL  [facility]   Urea Nitrogen 63 7 - 30 mg/dL H [facility]   Creatinine 2.78 0.52 - 1.04 mg/dL H [facility]   GFR Estimate 17 >60 mL/min/1.7m2 L [facility]   Comment:  Non  GFR Calc   GFR Estimate If Black 20 >60 mL/min/1.7m2 L [facility]   Comment:  African American GFR Calc   Calcium 7.7 8.5 - 10.1 mg/dL L [facility]            INR [[id number]] (Abnormal)  Resulted: 06/13/17 0533, Result status: Final result    Ordering provider: Mike Wong  MD  06/13/17 0001 Resulting lab: [facility]    Specimen Information    Type Source Collected On   Blood  06/13/17 0516          Components       Value Reference Range Flag Lab   INR 2.62 0.86 - 1.14 H [facility]            CBC with platelets [[id number]] (Abnormal)  Resulted: 06/13/17 0522, Result status: Final result    Ordering provider: Mike Wong MD  06/12/17 2300 Resulting lab: [facility]    Specimen Information    Type Source Collected On   Blood  06/13/17 0516          Components       Value Reference Range Flag Lab   WBC 6.4 4.0 - 11.0 10e9/L  [facility]   RBC Count 3.42 3.8 - 5.2 10e12/L L [facility]   Hemoglobin 8.0 11.7 - 15.7 g/dL L [facility]   Hematocrit 27.6 35.0 - 47.0 % L [facility]   MCV 81 78 - 100 fl  [facility]   MCH 23.4 26.5 - 33.0 pg L [facility]   MCHC 29.0 31.5 - 36.5 g/dL L [facility]   RDW 16.6 10.0 - 15.0 % H [facility]   Platelet Count 237 150 - 450 10e9/L  [facility]            Fractional excretion of sodium [[id number]]  Resulted: 06/12/17 2213, Result status: Final result    Ordering provider: Mike Wong MD  06/12/17 2017 Resulting lab: [facility]    Specimen Information    Type Source Collected On   Urine Urine clean catch 06/12/17 2050          Components       Value Reference Range Flag Lab   Creatinine Urine 14 mg/dL  [facility]   Sodium Urine mmol/L 115 mmol/L  [facility]   %FENA 14.3 %  [facility]   Comment:         Adult:    <1 percent             Indicates prerenal azotemia             >3 percent             Suggests acute tubular             necrosis   Neonates: <2.5 percent             Suggest prerenal azotemia             >2.5 percent             Suggest acute tubular necrosis              Creatinine serum [[id number]] (Abnormal)  Resulted: 06/12/17 2209, Result status: Final result    Ordering provider: Mike Wong MD  06/12/17 2100 Resulting lab: [facility]    Specimen Information    Type  Source Collected On     06/12/17 2141          Components       Value Reference Range Flag Lab   Creatinine 2.61 0.52 - 1.04 mg/dL H [facility]            Sodium [[id number]] (Abnormal)  Resulted: 06/12/17 2209, Result status: Final result    Ordering provider: Mike Wong MD  06/12/17 2100 Resulting lab: [facility]    Specimen Information    Type Source Collected On     06/12/17 2141          Components       Value Reference Range Flag Lab   Sodium 148 133 - 144 mmol/L H [facility]            Testing Performed By     Lab - Abbreviation Name Director Address Valid Date Range    22 - [facility] Unknown [address] [place] [address] 05/08/15 1057 - Present    59 - Unknown [facility] Unknown [address] [place] [address] 12/31/14 1006 - Present    170 - Unknown POINT OF CARE TEST, GLUCOSE Unknown Unknown 10/31/11 1114 - Present            Unresulted Labs (24h ago through future)    Start       Ordered    06/13/17 0600  INR  (warfarin (COUMADIN) Pharmacy Consult-INITIAL ORDER)  DAILY,   Routine      06/12/17 2017         Imaging Results - 3 Days      XR Chest Port 1 View [[id number]]  Resulted: 06/13/17 1240, Result status: Final result    Ordering provider: Micheal Morales MD  06/12/17 1703 Resulted by: Jason De Santiago MD    Performed: 06/12/17 1636 - 06/12/17 1638 Resulting lab: RADIOLOGY RESULTS    Narrative:       PORTABLE CHEST ONE VIEW   6/12/2017 4:38 PM     HISTORY: Bilateral lower extremity edema.    COMPARISON: Chest x-rays dated 5/31/2017.    FINDINGS: Cardiac silhouette remains enlarged even given technique.  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 Lungs are  otherwise grossly clear. No pneumothorax or significant pleural fluid  collection. No fracture.      Impression:       IMPRESSION: Cardiomegaly is again noted. No other evidence of acute  cardiopulmonary disease is seen.    I discussed  these findings with Dr. Morales on 6/12/2017 at  approximately 4:56 PM.    MOSHE LEYVA MD      Testing Performed By     Lab - Abbreviation Name Director Address Valid Date Range    104 - Rad [facility] RADIOLOGY RESULTS Unknown Unknown 02/16/05 1553 - Present            Encounter-Level Documents:     There are no encounter-level documents.      Order-Level Documents:     There are no order-level documents.

## 2017-06-12 NOTE — TELEPHONE ENCOUNTER
Reason for Call:  Other FYI  Detailed comments: Leona was unable to get a hold of Dr Jj because of computer and phone issues today, so Kathryn was sent to Emergency room.  Her current weight is 180 lbs, last Friday she was 172 lbs.  The other symptoms are weeping in left lower leg, wheezing in upper lobes.  Also the patient missed doses of medications last Friday evening, Saturday and Sunday mornings.  Clinic coordinator was notified and is setting up [facility].  The patient was sent to ED for congestive heart failure.    Any questions or concerns please give Leona a call.  Proper referral made.    Number can be reached at: [phone]    Best Time:     Can we leave a detailed message on this number? YES    Call taken on 6/12/2017 at 3:45 PM by Agatha Rosenthal

## 2017-06-12 NOTE — IP AVS SNAPSHOT
"[address] MEDICAL SURGICAL: [phone]                                              INTERAGENCY TRANSFER FORM - PHYSICIAN ORDERS   2017                    Hospital Admission Date: 2017  MARI HERNANDEZ   : 1940  Sex: Female        Attending Provider: Arash Silva MD     Allergies:  Ciprofloxacin, Oxycodone, Lisinopril, Penicillins, Sulfa Drugs    Infection:  None   Service:  HOSPITALIST    Ht:  1.626 m (5' 4\")   Wt:  73 kg (161 lb)   Admission Wt:  81.5 kg (179 lb 10.8 oz)    BMI:  27.64 kg/m 2   BSA:  1.82 m 2            Patient PCP Information     Provider PCP Type    Dheeraj Jj MD General      ED Clinical Impression     Diagnosis Description Comment Added By Time Added    Acute on chronic renal failure (H) [N17.9, N18.9] Acute on chronic renal failure (H) [N17.9, N18.9]  Micheal Morales MD 2017  7:05 PM      Hospital Problems as of 2017              Priority Class Noted POA    Restless leg syndrome Low  Unknown Yes    Iron deficiency anemia   10/17/2007 Yes    CAD - NSTEMI, CABG x 5 , angio in  with patent grafts other than occluded graft to PL   2013 Yes    Anemia of chronic renal failure, stage 4 (severe) (H) Medium  11/10/2014 Yes    Non morbid obesity, unspecified obesity type Medium  2015 Yes    Type 2 diabetes mellitus with other circulatory complications (H) Medium  2016 Yes    Essential hypertension with goal blood pressure less than 140/90 Medium  2016 Yes    Pulmonary hypertension (H) Medium  2017 Yes    Chronic heart failure (H) with preserved EF Medium  2017 Yes    Atrial flutter (H) - present  Medium  2017 Yes    * (Principal)Acute on chronic renal failure (H) Medium  2017 Yes    Memory loss Medium  2017 Yes    Chronic atrial fibrillation (H) on - Medium  2017 Yes    Blood in stool Medium  2017 Yes      Non-Hospital Problems as of 2017              Priority " Class Noted    Sleep apnea   Unknown    Lipoma of other skin and subcutaneous tissue   9/7/2004    ESOPHAGEAL REFLUX   3/28/2006    Postsurgical aortocoronary bypass status Low  1/27/2007    Chronic kidney disease, stage III (moderate) Medium  10/17/2007    History of peptic ulcer disease Low  10/17/2007    Edema Medium  1/31/2008    Kidney stone   12/29/2009    Hyperlipidemia LDL goal <100 Medium  10/31/2010    Advanced directives, counseling/discussion Low  11/12/2012    Hx of non-ST elevation myocardial infarction (NSTEMI)   2/4/2013    Health Care Home   1/20/2014    Lymphedema Medium  11/10/2014    Renal failure   3/23/2015    Osteoarthritis of hip Medium  4/29/2015    Long-term (current) use of anticoagulants [Z79.01] Medium  3/4/2016    Acute renal failure with tubular necrosis (H) Medium  7/2/2016    Atrial fibrillation with rapid ventricular response (H) Medium  4/28/2017    Rash - redness medial thighs Medium  4/29/2017    Metabolic acidosis, normal anion gap (NAG) Medium  4/30/2017    Generalized edema - anasarca Medium  6/1/2017    Hypertensive heart and chronic kidney disease with heart failure and stage 1 through stage 4 chronic kidney disease, or unspecified chronic kidney disease (H) Medium  6/1/2017    Supratherapeutic INR Medium  6/1/2017    Proteinuria 2.1 g/g Cr Medium  6/2/2017    Bradycardia Medium  6/2/2017      Code Status History     Date Active Date Inactive Code Status Order ID Comments User Context    6/15/2017  4:01 PM  Full Code [id number]  Arash Silva MD Outpatient    6/12/2017  8:17 PM 6/15/2017  4:01 PM Full Code [id number]  Mike Wong MD Inpatient    6/6/2017  1:12 PM 6/12/2017  8:17 PM Full Code [id number]  Kenji Fish MD Outpatient    5/31/2017  9:20 PM 6/6/2017  1:12 PM Full Code [id number]  Mike Wong MD Inpatient    5/2/2017 10:00 AM 5/31/2017  9:20 PM Full Code [id number]  Jian Hartley MD Outpatient    4/28/2017  7:16 PM 5/2/2017 10:00 AM Full  Code [id number]  Jian Hartley MD Inpatient    7/6/2016  1:46 PM 4/28/2017  7:16 PM Full Code [id number]  Isadora Mosley PA-C Outpatient    7/6/2016 10:41 AM 7/6/2016  1:46 PM Full Code [id number]  Arash Silva MD Outpatient    7/3/2016 11:12 AM 7/6/2016 10:41 AM Full Code [id number]  Isadora Mosley PA-C Inpatient    7/2/2016  5:08 AM 7/3/2016 11:12 AM Full Code [id number]  Mike Wong MD Inpatient    3/23/2015  7:40 PM 3/25/2015  1:40 PM Full Code [id number]  Luisa Adan MD Inpatient    12/28/2014  9:42 AM 3/23/2015  7:40 PM Full Code [id number]  Jian Hartley MD Outpatient    12/27/2014  4:26 PM 12/28/2014  9:42 AM Full Code [id number]  Mick Dale MD ED    12/9/2014  2:02 PM 12/27/2014  4:26 PM Full Code [id number]  Jian Hartley MD Outpatient    11/13/2014 11:04 AM 12/9/2014  2:02 PM Full Code [id number]  Jian Hartley MD Outpatient    11/10/2014  6:44 PM 11/13/2014 11:04 AM Full Code [id number]  Mike Wong MD Inpatient    9/14/2014  1:12 PM 11/10/2014  6:44 PM Full Code [id number]  Jian Hartley MD Outpatient    9/12/2014  1:02 PM 9/14/2014  1:12 PM Full Code [id number]  Radha Field PA-C Inpatient    7/20/2014  9:02 AM 9/12/2014  1:02 PM Full Code [id number]  Cesar Lafleur MD Outpatient    7/15/2014  5:49 PM 7/20/2014  9:02 AM Full Code [id number]  Radha Field PA-C Inpatient    2/22/2014  4:21 AM 2/22/2014  2:33 PM Full Code [id number]  Mike Ogden MD ED    1/17/2014  8:18 PM 1/18/2014 12:34 PM Full Code [id number]  Judson Gan, ROSARIO Inpatient    2/1/2013  2:01 PM 1/17/2014  8:18 PM Full Code [id number]  Nadira Mccann MD Outpatient    2/1/2013 12:18 AM 2/1/2013  2:01 PM Full Code [id number]  Rajinder Dwyer MD Inpatient    1/31/2013  9:39 PM 2/1/2013 12:18 AM Full Code [id number]  Arash Silva MD Outpatient    1/31/2013  7:09 PM 1/31/2013  9:39 PM Full Code [id number]  Radha Field PA-C Inpatient    7/10/2008   2:44 PM 1/31/2013  7:09 PM None None HEALTH CARE DIRECTIVE ON FILE 7-3-08 Melina [employer]         Medication Review      START taking        Dose / Directions Comments    acetaminophen 325 MG tablet   Commonly known as:  TYLENOL        Dose:  650 mg   Take 2 tablets (650 mg) by mouth every 4 hours as needed for mild pain   Quantity:  100 tablet   Refills:  0        bumetanide 2 MG tablet   Commonly known as:  BUMEX        Dose:  2 mg   Take 1 tablet (2 mg) by mouth daily   Refills:  0        metolazone 5 MG tablet   Commonly known as:  ZAROXOLYN        Dose:  5 mg   Take 1 tablet (5 mg) by mouth daily as needed For weight gain more than 2 pounds in 1 day or 5 pounds in 1 week   Quantity:  90 tablet   Refills:  3          CONTINUE these medications which may have CHANGED, or have new prescriptions. If we are uncertain of the size of tablets/capsules you have at home, strength may be listed as something that might have changed.        Dose / Directions Comments    calcium carbonate-vitamin D 500-400 MG-UNIT Tabs per tablet   This may have changed:  when to take this   Used for:  Closed intertrochanteric fracture of left hip, initial encounter (H)        Dose:  1 tablet   Take 1 tablet by mouth daily   Refills:  0        gabapentin 300 MG capsule   Commonly known as:  NEURONTIN   This may have changed:    - medication strength  - when to take this   Used for:  Diabetic polyneuropathy associated with type 2 diabetes mellitus (H)        Dose:  300 mg   Take 1 capsule (300 mg) by mouth At Bedtime   Refills:  0        metoprolol 25 MG tablet   Commonly known as:  LOPRESSOR   This may have changed:  how much to take   Used for:  Atrial fibrillation with rapid ventricular response (H)        Dose:  50 mg   Take 2 tablets (50 mg) by mouth 2 times daily   Quantity:  60 tablet   Refills:  0        nitroglycerin 0.4 MG sublingual tablet   Commonly known as:  NITROSTAT   This may have changed:    - when to take  this  - reasons to take this  - additional instructions   Used for:  Hx of non-ST elevation myocardial infarction (NSTEMI), Postsurgical aortocoronary bypass status        Dose:  0.4 mg   Place 1 tablet (0.4 mg) under the tongue every 5 minutes as needed for chest pain   Quantity:  25 tablet   Refills:  0        pramipexole 0.5 MG tablet   Commonly known as:  MIRAPEX   This may have changed:    - medication strength  - how much to take  - how to take this  - when to take this  - additional instructions   Used for:  Restless legs syndrome (RLS)        Dose:  1.5 mg   Take 3 tablets (1.5 mg) by mouth every evening   Refills:  0        pravastatin 40 MG tablet   Commonly known as:  PRAVACHOL   This may have changed:  medication strength   Used for:  Hx of non-ST elevation myocardial infarction (NSTEMI)        Dose:  40 mg   Take 1 tablet (40 mg) by mouth daily   Quantity:  30 tablet   Refills:  0        warfarin 2.5 MG tablet   Commonly known as:  COUMADIN   This may have changed:  additional instructions   Used for:  Atrial fibrillation with rapid ventricular response (H), Long-term (current) use of anticoagulants        Take 1 tablet (2.5 mg) daily until INR is rechecked within 3 days   Quantity:  10 tablet   Refills:  0          CONTINUE these medications which have NOT CHANGED        Dose / Directions Comments    cyanocobalamin 1000 MCG/ML injection   Commonly known as:  VITAMIN B12   Used for:  Vitamin B12 deficiency (non anemic)        Dose:  1 mL   Inject 1 mL (1,000 mcg) into the muscle every 30 days   Quantity:  1 mL   Refills:  11        ferrous sulfate 325 (65 FE) MG tablet   Commonly known as:  IRON        Dose:  325 mg   Take 1 tablet (325 mg) by mouth daily (with breakfast)   Quantity:  100 tablet   Refills:  0        isosorbide mononitrate 30 MG 24 hr tablet   Commonly known as:  IMDUR   Used for:  Hx of non-ST elevation myocardial infarction (NSTEMI)        Dose:  30 mg   Take 1 tablet (30 mg) by mouth  daily   Refills:  0        order for DME   Used for:  ANABEL (obstructive sleep apnea)        Equipment being ordered: cpap machine, mask, humidifier, tubing and filters   Quantity:  1 Device   Refills:  0          STOP taking     furosemide 40 MG tablet   Commonly known as:  LASIX           GLIPIZIDE XL PO                   Summary of Visit     Reason for your hospital stay       Hospitalized for weight gain and swelling due to worsening kidney failure and improved             After Care     Activity - Up with nursing assistance           Advance Diet as Tolerated       Follow this diet upon discharge: Orders Placed This Encounter      Room Service      Combination Diet [phone] Calories: Moderate Consistent CHO (4-6 CHO units/meal); 2 gm NA Diet       Daily weights       Call Provider for weight gain of more than 2 pounds per day or 5 pounds per week.       General info for SNF       Length of Stay Estimate: Short Term Care: Estimated # of Days <30  Condition at Discharge: Stable  Level of care:skilled   Rehabilitation Potential: Fair  Admission H&P remains valid and up-to-date: Yes  Recent Chemotherapy: N/A  Use Nursing Home Standing Orders: Yes       Glucose monitor nursing POCT       Daily at alternating times before meals and at bedtime             Referrals     NEPHROLOGY ADULT REFERRAL       Your provider has referred you to: FM[facility]: [facility] [place] [phone]   [email or web address]    Please be aware that coverage of these services is subject to the terms and limitations of your health insurance plan.  Call member services at your health plan with any benefit or coverage questions.      Reason for referral:  Persistent GFR in past 12 months <30 ml/min and Unexplained decline in eGFR > 15 ml/min between two readings. Hospitalized twice for volume overload due to acute renal failure and suspected  ATN within the past month.  Please bring the following to your appointment:    >>   Any x-rays, CTs or MRIs which have been performed.  Contact the facility where they were done to arrange for  prior to your scheduled appointment.   >>   List of current medications   >>   This referral request   >>   Any documents/labs given to you for this referral       Occupational Therapy Adult Consult       Evaluate and treat as clinically indicated.  Include functional cognitive evaluation as advised by OT during this hospital stay.    Reason:  Weakness, memory loss, kidney failure with uremia       Physical Therapy Adult Consult       Evaluate and treat as clinically indicated.    Reason:  weakness             Your next 10 appointments already scheduled     Jun 22, 2017 11:20 AM CDT   Return Visit with RHODA Viera CNP   [address] [address]   [phone]            Jul 27, 2017 11:00 AM CDT   Return Visit with Thaddeus King MD   [address] [address]   [phone]            Aug 08, 2017  2:00 PM CDT   New Visit [facility] Vibha Barfield MD   [facility]    [address] [place] [id number] [phone]              Follow-Up Appointment Instructions     Future Labs/Procedures    Follow Up and recommended labs and tests     Comments:    Follow up with primary care provider in 1 week.  The following labs/tests are recommended: INR on 6/19/17, basic metabolic panel and hemoglobin within 1 week.    Schedule appointment with [place]rology [place] [facility] [place]      Follow-Up Appointment Instructions     Follow Up and recommended labs and tests       Follow up with primary care provider in 1 week.  The following labs/tests are recommended: INR on 6/19/17, basic metabolic panel  and hemoglobin within 1 week.    [place] [facility] [place]             Statement of Approval     Ordered          06/16/17 0935  I have reviewed and agree with all the recommendations and orders detailed in this document.  EFFECTIVE NOW     Approved and electronically signed by:  Arash Silva MD

## 2017-06-12 NOTE — LETTER
Transition Communication Hand-off for Care Transitions to Next Level of Care Provider    Name: Kathryn Banks  MRN #: [id number]  Primary Care Provider: Dheeraj jJ     Primary Clinic: [facility] [address] [place] [address]     Reason for Hospitalization:  Acute on chronic renal failure (H) [N17.9, N18.9]  Admit Date/Time: 6/12/2017  3:32 PM  Discharge Date: 6/16/17  Payor Source: Payor: BCBS / Plan: [employer] PLATINUM BLUE / Product Type: PPO /     Readmission Assessment Measure (ANDREW) Risk Score/category: average    Plan of Care Goals/Milestone Events:   Patient Concerns: medication and weight management at home   Patient Goals:   Short-term To return home from [facility]  with new medication dispenser and manage at home with  and C   Long-term Monitor weight low sodium diet and medication management            Reason for Communication Hand-off Referral: Admission diagnoses: CHF    Discharge Plan:  Discharge Plan:       Most Recent Value    Concerns To Be Addressed compliance issue concerns           Concern for non-adherence with plan of care:   Y/N Yes patient seems to have some memory issues speaks well in the room has trouble following through with it when you come back the next day  Discharge Needs Assessment:  Needs       Most Recent Value    Anticipated Changes Related to Illness inability to care for self    Transportation Available car, family or friend will provide    Current Discharge Risk chronically ill, cognitively impaired, dependent with mobility/activities of daily living, lack of support system/caregiver    # of Referrals Placed by [facility]    PAS Number [id number]          Already enrolled in Tele-monitoring program and name of program:  no  Follow-up specialty is recommended: Yes    Follow-up plan:  Future Appointments  Date Time Provider Department Center   6/22/2017 11:20 AM Arlet Anna APRN CNP [facility]   7/27/2017 11:00 AM Thaddeus King MD [facility]  [facility]   8/8/2017 2:00 PM Vibha Barfield MD [facility]       Any outstanding tests or procedures:        Referrals     Future Labs/Procedures    NEPHROLOGY ADULT REFERRAL     Comments:    Your provider has referred you to: FM[facility]: [facility] [place] [phone]   [email or web address]    Please be aware that coverage of these services is subject to the terms and limitations of your health insurance plan.  Call member services at your health plan with any benefit or coverage questions.      Reason for referral:  Persistent GFR in past 12 months <30 ml/min and Unexplained decline in eGFR > 15 ml/min between two readings. Hospitalized twice for volume overload due to acute renal failure and suspected ATN within the past month.  Please bring the following to your appointment:    >>   Any x-rays, CTs or MRIs which have been performed.  Contact the facility where they were done to arrange for  prior to your scheduled appointment.   >>   List of current medications   >>   This referral request   >>   Any documents/labs given to you for this referral    Occupational Therapy Adult Consult     Comments:    Evaluate and treat as clinically indicated.  Include functional cognitive evaluation as advised by OT during this hospital stay.    Reason:  Weakness, memory loss, kidney failure with uremia    Physical Therapy Adult Consult     Comments:    Evaluate and treat as clinically indicated.    Reason:  weakness            Key Recommendations:      Kathryn Palumbo    AVS/Discharge Summary is the source of truth; this is a helpful guide for improved communication of patient story

## 2017-06-12 NOTE — H&P
[facility]    History and Physical  Hospitalist       Date of Admission:  6/12/2017  Date of Service (when I saw the patient): 06/12/17    Assessment & Plan       Active Problems:    Acute on chronic renal failure (H)    Assessment: patient sent to the ED due to weight gain.  She is feeling well without complaints.  She was discharged 6 days ago with lasix and home care has been following.  There is a note two days after discharge stating patient seems to be complaint with her medications but patient today is stating she has not been taking her second dose of lasix reliably.  She was noted to be 4 pounds up from discharge two days after she left and is now 12 pounds up from discharge.  Now it has gotten to the point that oral medications are unlikely to be successful based on how difficult it was to remove fluid during her last hospital stay.  She has had an ablation since she was last discharged.  During her last visit she appeared to have an ATN but her creatinine has continued to climb.  Nephrology was consulted by phone by Dr. Morales who suggested admission for diuresis and then outpatient f/u but they did not think she would need ultrafiltration since she has been non complaint. Renal ultrasound unremarkable 12 days ago    Plan: admit to inpatient, IV lasix infusion and metolazone for now, will add albumin if not diuresing well tonight, moreira for strict I and O's and will monitor her weights daily.  Will also monitor on tele to make sure her a fib is controlled.  Will check ua and FENA.      Type 2 diabetes mellitus with other circulatory complications (H)    Assessment: chronic    Plan: will hold po meds for now and cover with correction scale as needed      Atrial flutter (H)    Assessment: has now been ablated but on exam appears to be in a fib which has been suspected previously    Plan: pharmacy to manage coumadin, monitor on tele and continue metoprolol      Memory loss     Assessment: was noted during her last hospital stay and comes into question with missing more doses of her medicines again at home    Plan: OT evaluation      Chronic atrial fibrillation (H)    Assessment: was suspected previously and now appears to be present after her ablation for flutter    Plan: as above      Restless leg syndrome    Assessment: with confusion will hold her mirapex for now    Plan: as above      CAD - NSTEMI, CABG x 5 2007, angio in 2013 with patent grafts other than occluded graft to PL    Assessment: asymptomatic    Plan: no acute intervention      Anemia    Assessment: stable and consistent with anemia of chronic kidney disease    Plan: monitor      Non morbid obesity, unspecified obesity type    Assessment: chronic    Plan: no acute intervention      Essential hypertension with goal blood pressure less than 140/90    Assessment: chronic and controlled    Plan: continue home meds and monitor      Pulmonary hypertension (H)    Assessment: mild on her last echo    Plan: no intervention    DVT Prophylaxis: Warfarin  Code Status: Full Code    Disposition: Expected discharge in 2-3 days once fluid is off and her weight is stable and plan in place for medication management.    Mike Wong MD    Primary Care Physician   Dheeraj Jj    Chief Complaint   Weight gain    History is obtained from the patient    History of Present Illness   Kathryn Banks is a 77 year old female who presents with weight gain.  She was just discharged from the hospital 6 days ago.  She is feeling well but has gained 12 pounds since discharge.  She has been getting HHC daily but she admits she has not always taken her second dose of lasix.  Nursing notes from two days after discharge state she has been complaint with her medications but the patient says she forgets her second lasix dose but can't explain why she is forgetting.  She has not had any CP or SOB.  She has had some increased swelling in both legs.  No fever, chills or sweats.    Past Medical History    I have reviewed this patient's medical history and updated it with pertinent information if needed.   Past Medical History:   Diagnosis Date     Carpal tunnel syndrome      Coronary atherosclerosis of native coronary artery 2006    5 vessel CABG     OBSTRUCTIVE SLEEP APNEA     using CPAP     Osteoarthrosis, unspecified whether generalized or localized, unspecified site     generalized arthritis, particularly in her hands and feet         Other and combined forms of senile cataract 2000    Bilateral     Other and unspecified hyperlipidemia      RESTLESS LEGS SYNDROME      TENOSYNOV HAND/WRIST NEC 6/30/2006     Type II or unspecified type diabetes mellitus without mention of complication, not stated as uncontrolled 2001    Diabetes mellitus/pt is diet controlled with weight loss     Typical atrial flutter (H) 01/17/2007     Unspecified essential hypertension        Past Surgical History   I have reviewed this patient's surgical history and updated it with pertinent information if needed.  Past Surgical History:   Procedure Laterality Date     ANGIOPLASTY       C APPENDECTOMY       C CABG, VEIN, FIVE  12/06    SVG x 4 and LIMA to LAD     C SOTO W/O FACETEC FORAMOT/[facility] 1/2 VRT SEG, LUMBAR  1968     C REMV CATARACT INTRACAP,INSERT LENS      Bilateral     C TOTAL ABDOM HYSTERECTOMY  1995     - fibroids     C VAGOTOMY/PYLOROPLASTY,SELECT  1970     COLONOSCOPY  12/3/2007     COLONOSCOPY  1/17/2014    Procedure: COLONOSCOPY;  Colonoscopy;  Surgeon: Zack Stearns MD;  Location:  GI     CYSTOSCOPY  11/25/09    [facility] [place]     ENDOSCOPY  03/21/2000    Upper GI     HC COLONOSCOPY THRU STOMA, DIAGNOSTIC  10/7/04    poor prep, repeat in 2-3 years     HC COLONOSCOPY W/WO BRUSH/WASH  10/31/07    Repeat-poor quality prep     HC REMOVAL OF OVARY/TUBE(S)      Salpingo-Oophorectomy, Unilateral     HC REVISE MEDIAN N/CARPAL TUNNEL SURG      Carpal tunnel release     HC UGI  ENDOSCOPY DIAG W BIOPSY  10/31/07      UGI ENDOSCOPY, SIMPLE EXAM  12/3/2007     OPEN REDUCTION INTERNAL FIXATION HIP NAILING Left 7/3/2016    Procedure: OPEN REDUCTION INTERNAL FIXATION HIP NAILING;  Surgeon: Lake Schulz MD;  Location:  OR       Prior to Admission Medications   Prior to Admission Medications   Prescriptions Last Dose Informant Patient Reported? Taking?   GABAPENTIN PO 2017 at 0900  Yes Yes   Sig: Take 300 mg by mouth 2 times daily    GLIPIZIDE XL PO 2017 at 0900  Yes Yes   Sig: Take 10 mg by mouth daily   ORDER FOR DME   No No   Sig: Equipment being ordered: cpap machine, mask, humidifier, tubing and filters   PRAVASTATIN SODIUM PO Unknown at 2100  Yes No   Sig: Take 40 mg by mouth daily   calcium carbonate-vitamin D 500-400 MG-UNIT TABS tablt 2017 at 0900  No Yes   Sig: Take 1 tablet by mouth 3 times daily   Patient taking differently: Take 1 tablet by mouth once    cyanocobalamin (VITAMIN B12) 1000 MCG/ML injection Past Month at Unknown time  No Yes   Sig: Inject 1 mL (1,000 mcg) into the muscle every 30 days   ferrous sulfate (IRON) 325 (65 FE) MG tablet 2017 at 0900  No Yes   Sig: Take 1 tablet (325 mg) by mouth daily (with breakfast)   furosemide (LASIX) 40 MG tablet 2017 at 0900  No Yes   Si mg a day   Patient taking differently: 80 mg daily =40 mg a day   isosorbide mononitrate (IMDUR) 30 MG 24 hr tablet 2017 at 0900  No Yes   Sig: Take 3 tablets (90 mg) by mouth daily   Patient taking differently: Take 30 mg by mouth daily    metoprolol (LOPRESSOR) 25 MG tablet 2017 at 0900  No Yes   Sig: Take 1 tablet (25 mg) by mouth 2 times daily   nitroglycerin (NITROSTAT) 0.4 MG SL tablet More than a month at Unknown time  No No   Sig: Place 1 tablet under the tongue See Admin Instructions. for chest pain   pramipexole (MIRAPEX) 1 MG tablet 2017 at 2100  No Yes   Sig: TAKE 3 TABLETS BY MOUTH EVERY EVENING   warfarin (COUMADIN) 2.5 MG tablet  2017 at 2100  No Yes   Sig: Take 1 tablet on  and 2 tablets on  and recheck INR on       Facility-Administered Medications: None     Allergies   Allergies   Allergen Reactions     Ciprofloxacin Nausea and Vomiting     Zofran did not help     Oxycodone Visual Disturbance     Delusions, blackouts      Lisinopril Cough     Penicillins Rash     Sulfa Drugs GI Disturbance     LOSS OF TASTE       Social History   I have reviewed this patient's social history and updated it with pertinent information if needed. Kathryn Banks  reports that she quit smoking about 48 years ago. She quit after 10.00 years of use. She has never used smokeless tobacco. She reports that she drinks alcohol. She reports that she does not use illicit drugs.    Family History   I have reviewed this patient's family history and updated it with pertinent information if needed.   Family History   Problem Relation Age of Onset     DIABETES Father      AODM     Neurologic Disorder Father      Parkinson's     Blood Disease Father       from blood clot from leg to lung immediately after hip fracture     DIABETES Paternal Grandmother      adult onset     DIABETES Maternal Grandmother      adult onset     Hypertension No family hx of      CEREBROVASCULAR DISEASE No family hx of        Review of Systems   The 10 point Review of Systems is negative other than noted in the HPI or here.     Physical Exam                      Vital Signs with Ranges     0 lbs 0 oz    Constitutional:   awake, alert, cooperative, no apparent distress, and appears stated age     Eyes:   Lids and lashes normal, pupils equal, round and reactive to light, extra ocular muscles intact, sclera clear, conjunctiva normal     ENT:   Normocephalic, without obvious abnormality, atraumatic, sinuses nontender on palpation, external ears without lesions, oral pharynx with moist mucous membranes, tonsils without erythema or exudates, gums normal and good dentition.     Neck:    Supple, symmetrical, trachea midline, no adenopathy, thyroid symmetric, not enlarged and no tenderness, skin normal     Hematologic / Lymphatic:   no cervical lymphadenopathy and no supraclavicular lymphadenopathy     Back:   Symmetric, no curvature, spinous processes are non-tender on palpation, paraspinous muscles are non-tender on palpation, no costal vertebral tenderness     Lungs:   No increased work of breathing, good air exchange, clear to auscultation bilaterally, no crackles or wheezing     Cardiovascular:   Irregularly irregular with no audible murmur, rub or gallop.  She has mild edema in her legs, arms, buttocks and face     Abdomen:   normal bowel sounds, soft, non-distended, non-tender, no masses palpated, no hepatosplenomegally     Neurologic:   Awake, alert, oriented to name, place and time.  Cranial nerves II-XII are grossly intact.  Motor is 5 out of 5 bilaterally.   Sensory is intact.        Data   Data reviewed today:  I personally reviewed no images or EKG's today.    Recent Labs  Lab 06/12/17  1250 06/09/17 06/07/17  0740 06/06/17  0610   WBC 8.3  --   --  8.2   HGB 8.7*  --   --  9.7*   MCV 81  --   --  79     --   --  298   INR 2.44* 3.1 3.4* 2.48*   *  --  142 144   POTASSIUM 4.2  --  3.9 4.4   CHLORIDE 114*  --  102 101   CO2 24  --  33* 35*   BUN 60*  --  56* 63*   CR 2.49*  --  2.39* 2.76*   ANIONGAP 9  --  7 8   MALICK 7.6*  --  8.3* 8.0*   *  --  137* 66*   ALBUMIN 3.1*  --   --   --    PROTTOTAL 6.1*  --   --   --    BILITOTAL 0.3  --   --   --    ALKPHOS 177*  --   --   --    ALT 47  --   --   --    AST 42  --   --   --        Recent Results (from the past 24 hour(s))   XR Chest Port 1 View    Narrative    PORTABLE CHEST ONE VIEW   6/12/2017 4:38 PM     HISTORY: Bilateral lower extremity edema.    COMPARISON: Chest x-rays dated 5/31/2017.    FINDINGS: Cardiac silhouette remains enlarged even given technique.  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  Lungs are  otherwise grossly clear. No pneumothorax or significant pleural fluid  collection. No fracture.      Impression    IMPRESSION: Cardiomegaly is again noted. No other evidence of acute  cardiopulmonary disease is seen.    I discussed these findings with Dr. Morales on 6/12/2017 at  approximately 4:56 PM.

## 2017-06-12 NOTE — IP AVS SNAPSHOT
"` `           [address] MEDICAL SURGICAL: [phone]                                              INTERAGENCY TRANSFER FORM - NURSING   2017                    Hospital Admission Date: 2017  MARI HERNANDEZ   : 1940  Sex: Female        Attending Provider: Arash Silva MD     Allergies:  Ciprofloxacin, Oxycodone, Lisinopril, Penicillins, Sulfa Drugs    Infection:  None   Service:  HOSPITALIST    Ht:  1.626 m (5' 4\")   Wt:  73 kg (161 lb)   Admission Wt:  81.5 kg (179 lb 10.8 oz)    BMI:  27.64 kg/m 2   BSA:  1.82 m 2            Patient PCP Information     Provider PCP Type    Dheeraj Jj MD General      Current Code Status     Date Active Code Status Order ID Comments User Context       Prior      Code Status History     Date Active Date Inactive Code Status Order ID Comments User Context    6/15/2017  4:01 PM  Full Code [id number]  Arash Silva MD Outpatient    2017  8:17 PM 6/15/2017  4:01 PM Full Code [id number]  Mike Wong MD Inpatient    2017  1:12 PM 2017  8:17 PM Full Code [id number]  Kenji Fish MD Outpatient    2017  9:20 PM 2017  1:12 PM Full Code [id number]  Mike Wong MD Inpatient    2017 10:00 AM 2017  9:20 PM Full Code [id number]  Jian Hartley MD Outpatient    2017  7:16 PM 2017 10:00 AM Full Code [id number]  Jian Hartley MD Inpatient    2016  1:46 PM 2017  7:16 PM Full Code [id number]  Isadora Mosley PA-C Outpatient    2016 10:41 AM 2016  1:46 PM Full Code [id number]  Arash Silva MD Outpatient    7/3/2016 11:12 AM 2016 10:41 AM Full Code [id number]  Isadora Mosley PA-C Inpatient    2016  5:08 AM 7/3/2016 11:12 AM Full Code [id number]  Mike Wong MD Inpatient    3/23/2015  7:40 PM 3/25/2015  1:40 PM Full Code [id number]  Luisa Adan MD Inpatient    2014  9:42 AM 3/23/2015  7:40 PM Full Code [id number]  Jian Hartley MD " Outpatient    12/27/2014  4:26 PM 12/28/2014  9:42 AM Full Code [id number]  Mick Dale MD ED    12/9/2014  2:02 PM 12/27/2014  4:26 PM Full Code [id number]  Jian Hartley MD Outpatient    11/13/2014 11:04 AM 12/9/2014  2:02 PM Full Code [id number]  Jian Hartley MD Outpatient    11/10/2014  6:44 PM 11/13/2014 11:04 AM Full Code [id number]  Mike Wong MD Inpatient    9/14/2014  1:12 PM 11/10/2014  6:44 PM Full Code [id number]  Jian Hartley MD Outpatient    9/12/2014  1:02 PM 9/14/2014  1:12 PM Full Code [id number]  Radha Field PA-C Inpatient    7/20/2014  9:02 AM 9/12/2014  1:02 PM Full Code [id number]  Cesar Lafleur MD Outpatient    7/15/2014  5:49 PM 7/20/2014  9:02 AM Full Code [id number]  Radha Field PA-C Inpatient    2/22/2014  4:21 AM 2/22/2014  2:33 PM Full Code [id number]  Mike Ogden MD ED    1/17/2014  8:18 PM 1/18/2014 12:34 PM Full Code [id number]  Judson Gan RN Inpatient    2/1/2013  2:01 PM 1/17/2014  8:18 PM Full Code [id number]  Nadira Mccann MD Outpatient    2/1/2013 12:18 AM 2/1/2013  2:01 PM Full Code [id number]  Rajinder Dwyer MD Inpatient    1/31/2013  9:39 PM 2/1/2013 12:18 AM Full Code [id number]  Arash Silva MD Outpatient    1/31/2013  7:09 PM 1/31/2013  9:39 PM Full Code [id number]  Radha Field PA-FAINA Inpatient    7/10/2008  2:44 PM 1/31/2013  7:09 PM None None HEALTH CARE DIRECTIVE ON FILE 7-3-08 Rubens, Melina Demographics      Advance Directives        Does patient have a scanned Advance Directive/ACP document in EPIC?           Yes        Hospital Problems as of 6/16/2017              Priority Class Noted POA    Restless leg syndrome Low  Unknown Yes    Iron deficiency anemia   10/17/2007 Yes    CAD - NSTEMI, CABG x 5 2007, angio in 2013 with patent grafts other than occluded graft to PL   2/4/2013 Yes    Anemia of chronic renal failure, stage 4 (severe) (H) Medium  11/10/2014 Yes    Non morbid obesity, unspecified  obesity type Medium  11/1/2015 Yes    Type 2 diabetes mellitus with other circulatory complications (H) Medium  1/19/2016 Yes    Essential hypertension with goal blood pressure less than 140/90 Medium  9/13/2016 Yes    Pulmonary hypertension (H) Medium  5/24/2017 Yes    Chronic heart failure (H) with preserved EF Medium  5/31/2017 Yes    Atrial flutter (H) - present 6/12 Medium  5/31/2017 Yes    * (Principal)Acute on chronic renal failure (H) Medium  6/12/2017 Yes    Memory loss Medium  6/12/2017 Yes    Chronic atrial fibrillation (H) on 6/12-6/13 Medium  6/12/2017 Yes    Blood in stool Medium  6/14/2017 Yes      Non-Hospital Problems as of 6/16/2017              Priority Class Noted    Sleep apnea   Unknown    Lipoma of other skin and subcutaneous tissue   9/7/2004    ESOPHAGEAL REFLUX   3/28/2006    Postsurgical aortocoronary bypass status Low  1/27/2007    Chronic kidney disease, stage III (moderate) Medium  10/17/2007    History of peptic ulcer disease Low  10/17/2007    Edema Medium  1/31/2008    Kidney stone   12/29/2009    Hyperlipidemia LDL goal <100 Medium  10/31/2010    Advanced directives, counseling/discussion Low  11/12/2012    Hx of non-ST elevation myocardial infarction (NSTEMI)   2/4/2013    Health Care Home   1/20/2014    Lymphedema Medium  11/10/2014    Renal failure   3/23/2015    Osteoarthritis of hip Medium  4/29/2015    Long-term (current) use of anticoagulants [Z79.01] Medium  3/4/2016    Acute renal failure with tubular necrosis (H) Medium  7/2/2016    Atrial fibrillation with rapid ventricular response (H) Medium  4/28/2017    Rash - redness medial thighs Medium  4/29/2017    Metabolic acidosis, normal anion gap (NAG) Medium  4/30/2017    Generalized edema - anasarca Medium  6/1/2017    Hypertensive heart and chronic kidney disease with heart failure and stage 1 through stage 4 chronic kidney disease, or unspecified chronic kidney disease (H) Medium  6/1/2017    Supratherapeutic INR Medium   6/1/2017    Proteinuria 2.1 g/g Cr Medium  6/2/2017    Bradycardia Medium  6/2/2017      Immunizations     Name Date      Hepatitis A Vac Ped/Adol-2 Dose 10/13/98     Hepatitis A Vac Ped/Adol-2 Dose 03/11/98     Hepatitis B 09/25/00     Hepatitis B 10/19/99     Hepatitis B 08/10/99     Influenza (High Dose) 3 valent vaccine 09/13/16     Influenza (High Dose) 3 valent vaccine 09/23/15     Influenza (High Dose) 3 valent vaccine 09/24/14     Influenza (High Dose) 3 valent vaccine 12/06/13     Influenza (High Dose) 3 valent vaccine 09/25/12     Influenza (IIV3) 10/01/10     Influenza (IIV3) 10/21/09     Influenza (IIV3) 12/03/08     Influenza (IIV3) 12/06/07     Influenza (IIV3) 04/02/07     Influenza (IIV3) 11/29/06     Influenza (IIV3) 11/03/05     Influenza (IIV3) 10/29/04     Influenza (IIV3) 11/10/03     Influenza (IIV3) 10/16/02     Influenza (IIV3) 11/16/01     Meningococcal (Menomune ) 04/02/07     Pneumococcal (PCV 13) 09/13/16     Pneumococcal 23 valent 11/17/14     Pneumococcal 23 valent 12/03/08     Poliovirus, inactivated (IPV) 04/02/07     TD (ADULT, 7+) 10/31/10     TD (ADULT, 7+) 08/07/00     TD (ADULT, 7+) 06/20/96     Typhoid IM 04/02/07     Yellow Fever 04/02/07     Zoster vaccine, live 11/01/11          END      ASSESSMENT     Discharge Profile Flowsheet     EXPECTED DISCHARGE     Passing flatus  yes 06/16/17 0802    Expected Discharge Date  06/14/17 06/13/17 1419   COMMUNICATION ASSESSMENT      DISCHARGE NEEDS ASSESSMENT     Patient's communication style  spoken language (English or Bilingual) 06/12/17 2038    Concerns To Be Addressed  compliance issue concerns 06/15/17 1348   FINAL RESOURCES      Patient/family verbalizes understanding of discharge plan recommendations?  Yes 06/16/17 0636   Other Resources  Home Care 06/13/17 1008    Medical Team notified of plan?  yes 06/13/17 1419   PAS Number  [id number] 06/15/17 1634    Readmission Within The Last 30 Days  previous discharge plan unsuccessful  "06/16/17 0636   Senior Linkage Line Referral Placed  07/02/16 10/06/16 1312    Outpatient/Agency/Support Group Needs  inpatient rehabilitation facility ([facility]) 06/14/17 1507   F/U Appointment Brochure Provided  07/02/16 10/06/16 1312    Anticipated Changes Related to Illness  inability to care for self 06/16/17 0636   Existing Resources/Services  Home Care 11/18/14 1451    Equipment Currently Used at Home  grab bar;walker, rolling;dressing device 06/03/17 1027   SKIN      Transportation Available  car;family or friend will provide 06/16/17 0636   Inspection  Full 06/16/17 0103    Current Discharge Risk  chronically ill;cognitively impaired;dependent with mobility/activities of daily living;lack of support system/caregiver 06/16/17 0636   Skin WDL  ex 06/16/17 0103    # of Referrals Placed by [facility]  Homecare 06/13/17 1419   Skin Color/Characteristics  kailash (BLE) 06/16/17 0103    Key Recommendations  TBD after surgery 10/06/16 1312   Skin Temperature  warm 06/15/17 1602    Does Patient Need a Referral for Clinic CC  No 03/24/15 1445   Skin Moisture  dry 06/15/17 1602    Equipment Used at Home  walker, rolling 07/15/14 1946   Skin Elasticity  slow return to original state 06/15/17 1602    GASTROINTESTINAL (ADULT,PEDIATRIC,OB)     Skin Integrity  scab(s) (bilateral shins, drying, crusty) 06/16/17 0103    GI WDL  ex;GI symptoms 06/16/17 0802   SAFETY      Last Bowel Movement  06/16/17 06/16/17 0802   Safety WDL  WDL 06/16/17 0103    GI Signs/Symptoms  diarrhea 06/16/17 0802                      Assessment WDL (Within Defined Limits) Definitions           Safety WDL     Effective: 09/28/15    Row Information: <b>WDL Definition:</b> Bed in low position, wheels locked; call light in reach; upper side rails up x 2; ID band on<br> <font color=\"gray\"><i>Item=AS safety wdl>>List=AS safety wdl>>Version=F14</i></font>      Skin WDL     Effective: 09/28/15    Row Information: <b>WDL Definition:</b> Warm; dry; intact; elastic; " "without discoloration; pressure points without redness<br> <font color=\"gray\"><i>Item=AS skin wdl>>List=AS skin wdl>>Version=F14</i></font>      Vitals     Vital Signs Flowsheet     VITAL SIGNS     CLINICALLY ALIGNED PAIN ASSESSMENT (CAPA) ([facility] ADULTS ONLY)      Temp  98  F (36.7  C) 06/16/17 0756   Comfort  negligible pain 06/15/17 2341    Temp src  Oral 06/16/17 0756   HEIGHT AND WEIGHT      Resp  18 06/16/17 0756   Height  1.626 m (5' 4\") 06/12/17 2026    Pulse  65 06/16/17 0756   Weight  73 kg (161 lb) (standing scale) 06/16/17 0521    Heart Rate  65 06/16/17 0756   BSA (Calculated - sq m)  1.92 06/12/17 2026    Pulse/Heart Rate Source  Monitor 06/16/17 0756   BMI (Calculated)  30.91 06/12/17 2026    BP  117/65 06/16/17 0756   POSITIONING      BP Location  Left arm 06/16/17 0756   Body Position  independently positioning 06/16/17 0103    OXYGEN THERAPY     Head of Bed (HOB)  HOB at 30-45 degrees 06/16/17 0103    SpO2  95 % 06/15/17 2321   DAILY CARE      O2 Device  None (Room air) 06/15/17 2321   Activity Type  up in chair 06/16/17 0756    PAIN/COMFORT     Activity Level of Assistance  assistance, 1 person 06/16/17 0103    Patient Currently in Pain  denies 06/15/17 2341   Activity Assistive Device  gait belt;walker 06/16/17 0103    Preferred Pain Scale  CAPA (Clinically Aligned Pain Assessment) ([facility] Adults Only) 06/15/17 1543   ECG      0-10 Pain Scale  0 06/12/17 2007   ECG Rhythm  Normal sinus rhythm 06/15/17 1543            Patient Lines/Drains/Airways Status    Active LINES/DRAINS/AIRWAYS     Name: Placement date: Placement time: Site: Days: Last dressing change:    Wound 04/28/17 Lateral;Left Hip Surgical from previous surgery 04/28/17   1542   Hip   48     Wound 04/28/17 Lower;Left Leg Other (comment) scab dime size LLE 04/28/17   1630   Leg   48     Wound 05/31/17 Left Calf Other (comment) open blisters  05/31/17   2011   Calf   15     Wound 06/12/17 " Posterior;Right Calf Ulceration 06/12/17   2100   Calf   3     Rash 05/31/17 2013 Left breast patch 05/31/17   2013    15             Patient Lines/Drains/Airways Status    Active PICC/CVC     None            Intake/Output Detail Report     Date Intake     Output Net    Shift P.O. I.V. IV Piggyback Total Urine Total       Noc 06/14/17 2300 - 06/15/17 0659 240 88.4 -- 328.4 1830 1830 -1501.6    Day 06/15/17 0700 - 06/15/17 1459 710 -- -- 710 1185 1185 -475    Janki 06/15/17 1500 - 06/15/17 2259 760 -- -- 760 1775 1775 -1015    Noc 06/15/17 2300 - 06/16/17 0659 360 -- -- 360 1200 1200 -840    Day 06/16/17 0700 - 06/16/17 1459 -- -- -- -- 400 400 -400      Last Void/BM       Most Recent Value    Urine Occurrence 1 at 06/15/2017 0849    Stool Occurrence 1 at 06/15/2017 0849      Case Management/Discharge Planning     Case Management/Discharge Planning Flowsheet     REFERRAL INFORMATION     ASSESSMENT/CONCERNS TO BE ADDRESSED      Admission Type  inpatient 06/13/17 1417   Concerns To Be Addressed  compliance issue concerns 06/15/17 1348    Arrived From  home or self-care;home healthcare service 06/13/17 1417   DISCHARGE PLANNING      Referral Source  high risk screening 06/13/17 1417   Patient/family verbalizes understanding of discharge plan recommendations?  Yes 06/16/17 0636    # of Referrals Placed by [facility]  Homecare 06/13/17 1419   Medical Team notified of plan?  yes 06/13/17 1419    Reason For Consult  discharge planning 06/13/17 1417   Readmission Within The Last 30 Days  previous discharge plan unsuccessful 06/16/17 0636    Primary Care Clinic Name  [facility] 06/01/17 1318   Anticipated Changes Related to Illness  inability to care for self 06/16/17 0636    Primary Care MD Name  [facility] 06/01/17 1318   Transportation Available  car;family or friend will provide 06/16/17 0636    LIVING ENVIRONMENT     Current Discharge Risk  chronically ill;cognitively impaired;dependent with mobility/activities of daily living;lack  of support system/caregiver 06/16/17 0636    Lives With  spouse;grandchild(zoe) 06/13/17 1223   Key Recommendations  TBD after surgery 10/06/16 1312    Living Arrangements  house 06/13/17 1223   Does Patient Need a Referral for Clinic CC  No 03/24/15 1445    Provides Primary Care For  no one 06/12/17 2038   Outpatient/Agency/Support Group Needs  inpatient rehabilitation facility (specify) 06/14/17 1507    Able to Return to Prior Living Arrangements  yes 06/13/17 1417   Equipment Used at Home  walker, rolling 07/15/14 1946    ASSESSMENT OF FAMILY/SOCIAL SUPPORT     FINAL RESOURCES      Marital Status   06/13/17 1417   Equipment Currently Used at Home  grab bar;walker, rolling;dressing device 06/03/17 1027    Who is your support system?  ;Children 06/13/17 1417   Other Resources  Home Care 06/13/17 1008    Spouse's Name  Urbano 06/13/17 1419   PAS Number  [id number] 06/15/17 1634    Description of Support System  Involved;Supportive 06/13/17 1419   Senior Linkage Line Referral Placed  07/02/16 10/06/16 1312    Support Assessment  Adequate family and caregiver support 06/13/17 1419   F/U Appointment Brochure Provided  07/02/16 10/06/16 1312    COPING/STRESS     Existing Resources/Services  Home Care 11/18/14 1451    Major Change/Loss/Stressor  medical condition/diagnosis 06/12/17 2038   ABUSE RISK SCREEN      Patient Personal Strengths  motivated;resilient;expressive of emotions;expressive of needs 06/13/17 1419   QUESTION TO PATIENT:  Has a member of your family or a partner(now or in the past) intimidated, hurt, manipulated, or controlled you in any way?  no 06/12/17 2038    Sources Of Support  adult child(zoe);other family members;spouse 06/13/17 1419   QUESTION TO PATIENT: Do you feel safe going back to the place where you are living?  yes 06/12/17 2038    Reaction To Health Status  accepting 06/13/17 1419   OBSERVATION: Is there reason to believe there has been maltreatment of a vulnerable adult (ie.  Physical/Sexual/Emotional abuse, self neglect, lack of adequate food, shelter, medical care, or financial exploitation)?  no 06/12/17 2038    Understanding Of Condition And Treatment  adequate understanding of medical condition;adequate understanding of treatment 06/13/17 1419   (R) MENTAL HEALTH SUICIDE RISK      EXPECTED DISCHARGE     Are you depressed or being treated for depression?  No 06/12/17 2146    Expected Discharge Date  06/14/17 06/13/17 1411

## 2017-06-12 NOTE — IP AVS SNAPSHOT
MRN:[id number]                      After Visit Summary   6/12/2017    Kathryn Banks    MRN: [id number]           Thank you!     Thank you for choosing [facility] for your care. Our goal is always to provide you with excellent care. Hearing back from our patients is one way we can continue to improve our services. Please take a few minutes to complete the written survey that you may receive in the mail after you visit with us. Thank you!        Patient Information     Date Of Birth          1940        Designated Caregiver       Most Recent Value    Caregiver    Will someone help with your care after discharge? yes    Name of designated caregiver Urbano    Phone number of caregiver [phone]    Caregiver address [address] [address]      About your hospital stay     You were admitted on:  June 12, 2017 You last received care in the:  [address]    You were discharged on:  June 16, 2017        Reason for your hospital stay       Hospitalized for weight gain and swelling due to worsening kidney failure and improved                  Who to Call     For medical emergencies, please call 911.  For non-urgent questions about your medical care, please call your primary care provider or clinic, [phone]          Attending Provider     Provider Specialty    Micheal Morales MD Family Practice    Arash Silva MD Internal Medicine       Primary Care Provider Office Phone # Fax #    Dheeraj Jj [phone]      After Care Instructions     Activity - Up with nursing assistance           Advance Diet as Tolerated       Follow this diet upon discharge: Orders Placed This Encounter      Room Service      Combination Diet 7330-7871 Calories: Moderate Consistent CHO (4-6 CHO units/meal); 2 gm NA Diet            Daily weights       Call Provider for weight gain of more than 2 pounds per day or 5 pounds per week.            General info for  SNF       Length of Stay Estimate: Short Term Care: Estimated # of Days <30  Condition at Discharge: Stable  Level of care:skilled   Rehabilitation Potential: Fair  Admission H&P remains valid and up-to-date: Yes  Recent Chemotherapy: N/A  Use Nursing Home Standing Orders: Yes            Glucose monitor nursing POCT       Daily at alternating times before meals and at bedtime                  Follow-up Appointments     Follow Up and recommended labs and tests       Follow up with primary care provider in 1 week.  The following labs/tests are recommended: INR on 6/19/17, basic metabolic panel and hemoglobin within 1 week.    Schedule appointment with Nephrology for next available appointment at [facility] [place].                  Your next 10 appointments already scheduled     Jun 22, 2017 11:20 AM CDT   Return Visit with RHODA Viera CNP   [facility] ([address] [id number] [phone]            Jul 27, 2017 11:00 AM CDT   Return Visit with Thaddeus King MD   [address] [id number] [phone]            Aug 08, 2017  2:00 PM CDT   New Visit with Vibha Barfield MD   [facility]    [address] [place] [id number] [phone]              Additional Services     NEPHROLOGY ADULT REFERRAL       Your provider has referred you to: [facility]: [facility] [phone]   [email or web address]    Please be aware that coverage of these services is subject to the terms and limitations of your health insurance plan.  Call member services at your health plan with any benefit or coverage questions.      Reason for referral:  Persistent GFR in past 12 months <30 ml/min and Unexplained decline in eGFR >  15 ml/min between two readings. Hospitalized twice for volume overload due to acute renal failure and suspected ATN within the past month.  Please bring the following to your appointment:    >>   Any x-rays, CTs or MRIs which have been performed.  Contact the facility where they were done to arrange for  prior to your scheduled appointment.   >>   List of current medications   >>   This referral request   >>   Any documents/labs given to you for this referral            Occupational Therapy Adult Consult       Evaluate and treat as clinically indicated.  Include functional cognitive evaluation as advised by OT during this hospital stay.    Reason:  Weakness, memory loss, kidney failure with uremia            Physical Therapy Adult Consult       Evaluate and treat as clinically indicated.    Reason:  weakness                  Warfarin Instruction     You have started taking a medicine called warfarin. This is a blood-thinning medicine (anticoagulant). It helps prevent and treat blood clots.      Before leaving the hospital, make sure you know how much to take and how long to take it.      You will need regular blood tests to make sure your blood is clotting safely. It is very important to see your doctor for regular blood tests.    Talk to your doctor before taking any new medicine (this includes over-the-counter drugs and herbal products). Many medicines can interact with warfarin. This may cause more bleeding or too much clotting.     Eating a lot of vitamin K--found in green, leafy vegetables--can change the way warfarin works in your body. Do NOT avoid these foods. Instead, try to eat the same amount each day.     Bleeding is the most common side-effect of warfarin. You may notice bleeding gums, a bloody nose, bruises and bleeding longer when you cut yourself. See a doctor at once if:   o You cough up blood  o You find blood in your stool (poop)  o You have a deep cut, or a cut that bleeds longer than 10  "minutes   o You have a bad cut, hard fall, accident or hit your head (go to urgent care or the emergency room).    For women who can get pregnant: This medicine can harm an unborn baby. Be very careful not to get pregnant while taking this medicine. If you think you might be pregnant, call your doctor right away.    For more information, read \"Guide to Warfarin Therapy,  the booklet you received in the hospital.        Pending Results     No orders found from 6/10/2017 to 2017.            Statement of Approval     Ordered          17 0935  I have reviewed and agree with all the recommendations and orders detailed in this document.  EFFECTIVE NOW     Approved and electronically signed by:  Arash Silva MD             Admission Information     Date & Time Provider Department Dept. Phone    2017 Arash Silva MD [address] [phone]      Your Vitals Were     Blood Pressure Pulse Temperature Respirations Height Weight    117/65 (BP Location: Left arm) 65 98  F (36.7  C) (Oral) 18 1.626 m (5' 4\") 73 kg (161 lb)    Pulse Oximetry BMI (Body Mass Index)                95% 27.64 kg/m2          MyChart Information     [facility] lets you send messages to your doctor, view your test results, renew your prescriptions, schedule appointments and more. To sign up, go to [email or web address] . Click on \"Log in\" on the left side of the screen, which will take you to the Welcome page. Then click on \"Sign up Now\" on the right side of the page.     You will be asked to enter the access code listed below, as well as some personal information. Please follow the directions to create your username and password.     Your access code is: [email or web address]  Expires: 2017 11:18 AM     Your access code will  in 90 days. If you need help or a new code, please call your [facility] or [phone].        Care EveryWhere ID     This is your Care EveryWhere ID. This could be used by " other organizations to access your [facility] medical records  [id number]           Review of your medicines      START taking        Dose / Directions    acetaminophen 325 MG tablet   Commonly known as:  TYLENOL        Dose:  650 mg   Take 2 tablets (650 mg) by mouth every 4 hours as needed for mild pain   Quantity:  100 tablet   Refills:  0       bumetanide 2 MG tablet   Commonly known as:  BUMEX        Dose:  2 mg   Take 1 tablet (2 mg) by mouth daily   Refills:  0       metolazone 5 MG tablet   Commonly known as:  ZAROXOLYN        Dose:  5 mg   Take 1 tablet (5 mg) by mouth daily as needed For weight gain more than 2 pounds in 1 day or 5 pounds in 1 week   Quantity:  90 tablet   Refills:  3         CONTINUE these medicines which may have CHANGED, or have new prescriptions. If we are uncertain of the size of tablets/capsules you have at home, strength may be listed as something that might have changed.        Dose / Directions    calcium carbonate-vitamin D 500-400 MG-UNIT Tabs per tablet   This may have changed:  when to take this   Used for:  Closed intertrochanteric fracture of left hip, initial encounter (H)        Dose:  1 tablet   Take 1 tablet by mouth daily   Refills:  0       gabapentin 300 MG capsule   Commonly known as:  NEURONTIN   This may have changed:    - medication strength  - when to take this   Used for:  Diabetic polyneuropathy associated with type 2 diabetes mellitus (H)        Dose:  300 mg   Take 1 capsule (300 mg) by mouth At Bedtime   Refills:  0       metoprolol 25 MG tablet   Commonly known as:  LOPRESSOR   This may have changed:  how much to take   Used for:  Atrial fibrillation with rapid ventricular response (H)        Dose:  50 mg   Take 2 tablets (50 mg) by mouth 2 times daily   Quantity:  60 tablet   Refills:  0       nitroglycerin 0.4 MG sublingual tablet   Commonly known as:  NITROSTAT   This may have changed:    - when to take this  - reasons to take this  - additional  instructions   Used for:  Hx of non-ST elevation myocardial infarction (NSTEMI), Postsurgical aortocoronary bypass status        Dose:  0.4 mg   Place 1 tablet (0.4 mg) under the tongue every 5 minutes as needed for chest pain   Quantity:  25 tablet   Refills:  0       pramipexole 0.5 MG tablet   Commonly known as:  MIRAPEX   This may have changed:    - medication strength  - how much to take  - how to take this  - when to take this  - additional instructions   Used for:  Restless legs syndrome (RLS)        Dose:  1.5 mg   Take 3 tablets (1.5 mg) by mouth every evening   Refills:  0       pravastatin 40 MG tablet   Commonly known as:  PRAVACHOL   This may have changed:  medication strength   Used for:  Hx of non-ST elevation myocardial infarction (NSTEMI)        Dose:  40 mg   Take 1 tablet (40 mg) by mouth daily   Quantity:  30 tablet   Refills:  0       warfarin 2.5 MG tablet   Commonly known as:  COUMADIN   This may have changed:  additional instructions   Used for:  Atrial fibrillation with rapid ventricular response (H), Long-term (current) use of anticoagulants        Take 1 tablet (2.5 mg) daily until INR is rechecked within 3 days   Quantity:  10 tablet   Refills:  0         CONTINUE these medicines which have NOT CHANGED        Dose / Directions    cyanocobalamin 1000 MCG/ML injection   Commonly known as:  VITAMIN B12   Used for:  Vitamin B12 deficiency (non anemic)        Dose:  1 mL   Inject 1 mL (1,000 mcg) into the muscle every 30 days   Quantity:  1 mL   Refills:  11       ferrous sulfate 325 (65 FE) MG tablet   Commonly known as:  IRON        Dose:  325 mg   Take 1 tablet (325 mg) by mouth daily (with breakfast)   Quantity:  100 tablet   Refills:  0       isosorbide mononitrate 30 MG 24 hr tablet   Commonly known as:  IMDUR   Used for:  Hx of non-ST elevation myocardial infarction (NSTEMI)        Dose:  30 mg   Take 1 tablet (30 mg) by mouth daily   Refills:  0       order for DME   Used for:  ANABEL  (obstructive sleep apnea)        Equipment being ordered: cpap machine, mask, humidifier, tubing and filters   Quantity:  1 Device   Refills:  0         STOP taking     furosemide 40 MG tablet   Commonly known as:  LASIX           GLIPIZIDE XL PO                Where to get your medicines      Some of these will need a paper prescription and others can be bought over the counter. Ask your nurse if you have questions.     You don't need a prescription for these medications     acetaminophen 325 MG tablet    bumetanide 2 MG tablet    calcium carbonate-vitamin D 500-400 MG-UNIT Tabs per tablet    ferrous sulfate 325 (65 FE) MG tablet    gabapentin 300 MG capsule    isosorbide mononitrate 30 MG 24 hr tablet    metolazone 5 MG tablet    metoprolol 25 MG tablet    nitroglycerin 0.4 MG sublingual tablet    pramipexole 0.5 MG tablet    pravastatin 40 MG tablet    warfarin 2.5 MG tablet                Protect others around you: Learn how to safely use, store and throw away your medicines at www.disposemymeds.org.             Medication List: This is a list of all your medications and when to take them. Check marks below indicate your daily home schedule. Keep this list as a reference.      Medications           Morning Afternoon Evening Bedtime As Needed    acetaminophen 325 MG tablet   Commonly known as:  TYLENOL   Take 2 tablets (650 mg) by mouth every 4 hours as needed for mild pain                                   bumetanide 2 MG tablet   Commonly known as:  BUMEX   Take 1 tablet (2 mg) by mouth daily   Last time this was given:  2 mg on 6/16/2017  8:28 AM                                   calcium carbonate-vitamin D 500-400 MG-UNIT Tabs per tablet   Take 1 tablet by mouth daily                                   cyanocobalamin 1000 MCG/ML injection   Commonly known as:  VITAMIN B12   Inject 1 mL (1,000 mcg) into the muscle every 30 days                                   ferrous sulfate 325 (65 FE) MG tablet   Commonly  known as:  IRON   Take 1 tablet (325 mg) by mouth daily (with breakfast)   Last time this was given:  325 mg on 6/16/2017  8:28 AM                                   gabapentin 300 MG capsule   Commonly known as:  NEURONTIN   Take 1 capsule (300 mg) by mouth At Bedtime   Last time this was given:  300 mg on 6/16/2017  8:29 AM                                   isosorbide mononitrate 30 MG 24 hr tablet   Commonly known as:  IMDUR   Take 1 tablet (30 mg) by mouth daily   Last time this was given:  30 mg on 6/16/2017  8:29 AM                                   metolazone 5 MG tablet   Commonly known as:  ZAROXOLYN   Take 1 tablet (5 mg) by mouth daily as needed For weight gain more than 2 pounds in 1 day or 5 pounds in 1 week   Last time this was given:  5 mg on 6/15/2017  9:12 AM                            Weight gain more than 2 pounds in 1 day or 5 pounds in 1 week       metoprolol 25 MG tablet   Commonly known as:  LOPRESSOR   Take 2 tablets (50 mg) by mouth 2 times daily   Last time this was given:  50 mg on 6/16/2017  8:29 AM                                      nitroglycerin 0.4 MG sublingual tablet   Commonly known as:  NITROSTAT   Place 1 tablet (0.4 mg) under the tongue every 5 minutes as needed for chest pain                                   order for DME   Equipment being ordered: cpap machine, mask, humidifier, tubing and filters                                pramipexole 0.5 MG tablet   Commonly known as:  MIRAPEX   Take 3 tablets (1.5 mg) by mouth every evening                                   pravastatin 40 MG tablet   Commonly known as:  PRAVACHOL   Take 1 tablet (40 mg) by mouth daily   Last time this was given:  40 mg on 6/16/2017  8:28 AM                                   warfarin 2.5 MG tablet   Commonly known as:  COUMADIN   Take 1 tablet (2.5 mg) daily until INR is rechecked within 3 days   Last time this was given:  2.5 mg on 6/15/2017  6:32 PM                                             More  Information        Patient Education    Bumetanide Oral tablet    Bumetanide Solution for injection  Bumetanide Oral tablet  What is this medicine?  BUMETANIDE (byoo MET a nide) is a diuretic. It helps you make more urine and to lose salt and excess water from your body. This medicine is used to treat high blood pressure, and edema or swelling from heart, kidney, or liver disease.  This medicine may be used for other purposes; ask your health care provider or pharmacist if you have questions.  What should I tell my health care provider before I take this medicine?  They need to know if you have any of these conditions:    abnormal blood electrolytes    diarrhea or vomiting    gout    heart disease    kidney disease, small amounts of urine, or difficulty passing urine    liver disease    an unusual or allergic reaction to bumetanide, sulfa drugs, other medicines, foods, dyes, or preservatives    pregnant or trying to get pregnant    breast-feeding  How should I use this medicine?  Take this medicine by mouth with a glass of water. Follow the directions on the prescription label. You may take this medicine with or without food but if it upsets your stomach, take it with food or milk. Do not take it more often than directed. Remember that you will need to pass more urine after taking this medicine. Do not take it at a time of day that will cause you problems. Do not take at bedtime.  Talk to your pediatrician regarding the use of this medicine in children. Special care may be needed.  Overdosage: If you think you have taken too much of this medicine contact a poison control center or emergency room at once.  NOTE: This medicine is only for you. Do not share this medicine with others.  What if I miss a dose?  If you miss a dose, take it as soon as you can. If it is almost time for your next dose, take only that dose. Do not take double or extra doses.  What may interact with this medicine?    alcohol    certain  antibiotics given by injection    diuretics    heart medicines like digoxin and dofetilide    hormones like cortisone, fludrocortisone, hydrocortisone    lithium    medicines for diabetes    medicines for high blood pressure    medicines for inflammation like indomethacin    OTC supplements like ginseng and ephedra  This list may not describe all possible interactions. Give your health care provider a list of all the medicines, herbs, non-prescription drugs, or dietary supplements you use. Also tell them if you smoke, drink alcohol, or use illegal drugs. Some items may interact with your medicine.  What should I watch for while using this medicine?  Visit your doctor or health care professional for regular check ups. Check your blood pressure regularly. Ask your doctor or health care professional what your blood pressure should be, and when you should contact him or her. If you are a diabetic, check your blood sugar as directed.  You may need to be on a special diet while taking this medicine. Ask your doctor. Also, ask how many glasses of fluid you need to drink each day. You must not get dehydrated.  You may get drowsy or dizzy. Do not drive, use machinery, or do anything that needs mental alertness until you know how this drug affects you. Do not stand or sit up quickly, especially if you are an older patient. This reduces the risk of dizzy or fainting spells. Alcohol can make you more drowsy and dizzy. Avoid alcoholic drinks.  What side effects may I notice from receiving this medicine?  Side effects that you should report to your doctor or health care professional as soon as possible:    allergic reactions like skin rash, itching or hives, swelling of the face, lips, or tongue    blood in the urine or stools    blurred vision    dry mouth    fever or chills    hearing loss or ringing in the ears    irregular heartbeat    muscle cramps, pain or weakness    unusually weak or tired    vomiting or diarrhea  Side  effects that usually do not require medical attention (report to your doctor or health care professional if they continue or are bothersome):    headache    loss of appetite    unusual bleeding or bruising  This list may not describe all possible side effects. Call your doctor for medical advice about side effects. You may report side effects to FDA at 7-188-GPB-6522.  Where should I keep my medicine?  Keep out of the reach of children.  Store at room temperature between 15 and 30 degrees C (59 and 86 degrees F). Throw away any unused medicine after the expiration date.  NOTE:This sheet is a summary. It may not cover all possible information. If you have questions about this medicine, talk to your doctor, pharmacist, or health care provider. Copyright  2016 Gold Standard                Patient Education    Acetaminophen Chewable tablet    Acetaminophen Elixir    Acetaminophen Oral capsule    Acetaminophen Oral disintegrating tablet    Acetaminophen Oral drops, solution    Acetaminophen Oral drops, suspension    Acetaminophen Oral solution    Acetaminophen Oral suspension    Acetaminophen Oral tablet    Acetaminophen Oral tablet, biphasic release    Acetaminophen Oral tablet, extended-release    Acetaminophen Rectal suppository    Acetaminophen Solution for injection  Acetaminophen Oral capsule  What is this medicine?  ACETAMINOPHEN (a set a SARAH kellen fen) is a pain reliever. It is used to treat mild pain and fever.  This medicine may be used for other purposes; ask your health care provider or pharmacist if you have questions.  What should I tell my health care provider before I take this medicine?  They need to know if you have any of these conditions:    if you often drink alcohol    liver disease    an unusual or allergic reaction to acetaminophen, other medicines, foods, dyes, or preservatives    pregnant or trying to get pregnant    breast-feeding  How should I use this medicine?  Take this medicine by mouth with  a glass of water. Follow the directions on the package or prescription label. Take your medicine at regular intervals. Do not take your medicine more often than directed.  Talk to your pediatrician regarding the use of this medicine in children. While this drug may be prescribed for children as young as 6 years of age for selected conditions, precautions do apply.  Overdosage: If you think you have taken too much of this medicine contact a poison control center or emergency room at once.  NOTE: This medicine is only for you. Do not share this medicine with others.  What if I miss a dose?  If you miss a dose, take it as soon as you can. If it is almost time for your next dose, take only that dose. Do not take double or extra doses.  What may interact with this medicine?    alcohol    imatinib    isoniazid    other medicines with acetaminophen  This list may not describe all possible interactions. Give your health care provider a list of all the medicines, herbs, non-prescription drugs, or dietary supplements you use. Also tell them if you smoke, drink alcohol, or use illegal drugs. Some items may interact with your medicine.  What should I watch for while using this medicine?  Tell your doctor or health care professional if the pain lasts more than 10 days (5 days for children), if it gets worse, or if there is a new or different kind of pain. Also, check with your doctor if a fever lasts for more than 3 days.  Do not take other medicines that contain acetaminophen with this medicine. Always read labels carefully. If you have questions, ask your doctor or pharmacist.  If you take too much acetaminophen get medical help right away. Too much acetaminophen can be very dangerous and cause liver damage. Even if you do not have symptoms, it is important to get help right away.  What side effects may I notice from receiving this medicine?  Side effects that you should report to your doctor or health care professional as  soon as possible:    allergic reactions like skin rash, itching or hives, swelling of the face, lips, or tongue    breathing problems    fever or sore throat    redness, blistering, peeling or loosening of the skin, including inside the mouth    trouble passing urine or change in the amount of urine    unusual bleeding or bruising    unusually weak or tired    yellowing of the eyes or skin  Side effects that usually do not require medical attention (report to your doctor or health care professional if they continue or are bothersome):    headache    nausea, stomach upset  This list may not describe all possible side effects. Call your doctor for medical advice about side effects. You may report side effects to FDA at 5-985-EQO-6587.  Where should I keep my medicine?  Keep out of reach of children.  Store at room temperature between 20 and 25 degrees C (68 and 77 degrees F). Protect from moisture and heat. Throw away any unused medicine after the expiration date.  NOTE:This sheet is a summary. It may not cover all possible information. If you have questions about this medicine, talk to your doctor, pharmacist, or health care provider. Copyright  2016 Gold Standard                Metolazone Oral tablet  What is this medicine?  METOLAZONE (me TOLE a zone) is a diuretic. It increases the amount of urine passed, which causes the body to lose salt and water. This medicine is used to treat high blood pressure. It is also reduces the swelling and water retention caused by heart or kidney disease.  This medicine may be used for other purposes; ask your health care provider or pharmacist if you have questions.  What should I tell my health care provider before I take this medicine?  They need to know if you have any of these conditions:    diabetes    gout    immune system problems, like lupus    kidney disease    liver disease    pancreatitis    small amount of urine or difficulty passing urine    an unusual or allergic  reaction to metolazone, sulfa drugs, other medicines, foods, dyes, or preservatives    pregnant or trying to get pregnant    breast-feeding  How should I use this medicine?  Take this medicine by mouth with a glass of water. Follow the directions on the prescription label. Remember that you will need to pass urine frequently after taking this medicine. Do not take your doses at a time of day that will cause you problems. Do not take at bedtime. Take your medicine at regular intervals. Do not take your medicine more often than directed. Do not stop taking except on your doctor's advice.  Talk to your pediatrician regarding the use of this medicine in children. Special care may be needed.  Overdosage: If you think you have taken too much of this medicine contact a poison control center or emergency room at once.  NOTE: This medicine is only for you. Do not share this medicine with others.  What if I miss a dose?  If you miss a dose, take it as soon as you can. If it is almost time for your next dose, take only that dose. Do not take double or extra doses.  What may interact with this medicine?    alcohol    antiinflammatory drugs for pain or swelling    barbiturates for sleep or seizure control    digoxin    dofetilide    lithium    medicines for blood sugar    medicines for high blood pressure    medicines that relax muscles for surgery    methenamine    other diuretics    some medicines for pain    steroid hormones like cortisone, hydrocortisone, and prednisone    warfarin  This list may not describe all possible interactions. Give your health care provider a list of all the medicines, herbs, non-prescription drugs, or dietary supplements you use. Also tell them if you smoke, drink alcohol, or use illegal drugs. Some items may interact with your medicine.  What should I watch for while using this medicine?  Visit your doctor or health care professional for regular checks on your progress. Check your blood pressure  as directed. Ask your doctor or health care professional what your blood pressure should be and when you should contact him or her.  You may need to be on a special diet while taking this medicine. Ask your doctor.  Check with your doctor or health care professional if you get an attack of severe diarrhea, nausea and vomiting, or if you sweat a lot. The loss of too much body fluid can make it dangerous for you to take this medicine.  You may get drowsy or dizzy. Do not drive, use machinery, or do anything that needs mental alertness until you know how this medicine affects you. Do not stand or sit up quickly, especially if you are an older patient. This reduces the risk of dizzy or fainting spells. Alcohol may interfere with the effect of this medicine. Avoid alcoholic drinks.  This medicine may affect your blood sugar level. If you have diabetes, check with your doctor or health care professional before changing the dose of your diabetic medicine.  This medicine can make you more sensitive to the sun. Keep out of the sun. If you cannot avoid being in the sun, wear protective clothing and use sunscreen. Do not use sun lamps or tanning beds/booths.  What side effects may I notice from receiving this medicine?  Side effects that you should report to your doctor or health care professional as soon as possible:    allergic reactions such as skin rash or itching, hives, swelling of the lips, mouth, tongue, or throat    fast or irregular heartbeat, chest pain    feeling faint    fever, chills    gout pain    hot red lump on leg    muscle pain, cramps    nausea, vomiting    numbness or tingling in hands, feet    pain or difficulty when passing urine    redness, blistering, peeling or loosening of the skin, including inside the mouth    unusual bleeding or bruising    unusually weak or tired    yellowing of the eyes, skin  Side effects that usually do not require medical attention (report to your doctor or health care  professional if they continue or are bothersome):    abdominal pain    blurred vision    constipation or diarrhea    dry mouth    headache  This list may not describe all possible side effects. Call your doctor for medical advice about side effects. You may report side effects to FDA at 1-573-HKG-3592.  Where should I keep my medicine?  Keep out of the reach of children.  Store at room temperature between 15 and 30 degrees C (59 and 86 degrees F). Protect from light. Keep container tightly closed. Throw away any unused medicine after the expiration date.  NOTE:This sheet is a summary. It may not cover all possible information. If you have questions about this medicine, talk to your doctor, pharmacist, or health care provider. Copyright  2016 Gold Standard

## 2017-06-12 NOTE — PROGRESS NOTES
Clinic Care Coordination Contact  Care Team Conversations    Called and spoke with Karly RN home care nurse, she states pt's weight has been going up since she has been home and is concerned there is something going on with her kidneys. States pt has been compliant this time with her medications.  Karly is going out there today to get her started on Tele health through home care, however this would cost her 50 dollars a month and pretty sure she will not be wanting to continue to pay.  I did let Karly know we have a pharos program that is no cost to the pt which may be a better option and increase compliance and weight management.  Looking at our program it looks like pt was enrolled however, they have not been able to connect with the pt.  I did let Karly know and she is going out there today to set up meds and do INR and will talk to the pt about accepting the program.      Meenakshi Larsen RN,BSN  Clinical Care Coordinator    [facility] [phone]  [facility] [phone]

## 2017-06-13 ENCOUNTER — APPOINTMENT (OUTPATIENT)
Dept: OCCUPATIONAL THERAPY | Facility: CLINIC | Age: 77
DRG: 683 | End: 2017-06-13
Attending: INTERNAL MEDICINE
Payer: MEDICARE

## 2017-06-13 ENCOUNTER — CARE COORDINATION (OUTPATIENT)
Dept: CARE COORDINATION | Facility: CLINIC | Age: 77
End: 2017-06-13

## 2017-06-13 LAB
ANION GAP SERPL CALCULATED.3IONS-SCNC: 11 MMOL/L (ref 3–14)
BACTERIA SPEC CULT: NORMAL
BUN SERPL-MCNC: 63 MG/DL (ref 7–30)
CALCIUM SERPL-MCNC: 7.7 MG/DL (ref 8.5–10.1)
CHLORIDE SERPL-SCNC: 113 MMOL/L (ref 94–109)
CO2 SERPL-SCNC: 24 MMOL/L (ref 20–32)
CREAT SERPL-MCNC: 2.78 MG/DL (ref 0.52–1.04)
ERYTHROCYTE [DISTWIDTH] IN BLOOD BY AUTOMATED COUNT: 16.6 % (ref 10–15)
GFR SERPL CREATININE-BSD FRML MDRD: 17 ML/MIN/1.7M2
GLUCOSE BLDC GLUCOMTR-MCNC: 122 MG/DL (ref 70–99)
GLUCOSE BLDC GLUCOMTR-MCNC: 133 MG/DL (ref 70–99)
GLUCOSE BLDC GLUCOMTR-MCNC: 169 MG/DL (ref 70–99)
GLUCOSE BLDC GLUCOMTR-MCNC: 170 MG/DL (ref 70–99)
GLUCOSE SERPL-MCNC: 75 MG/DL (ref 70–99)
HCT VFR BLD AUTO: 27.6 % (ref 35–47)
HGB BLD-MCNC: 8 G/DL (ref 11.7–15.7)
INR PPP: 2.62 (ref 0.86–1.14)
MCH RBC QN AUTO: 23.4 PG (ref 26.5–33)
MCHC RBC AUTO-ENTMCNC: 29 G/DL (ref 31.5–36.5)
MCV RBC AUTO: 81 FL (ref 78–100)
MICRO REPORT STATUS: NORMAL
PLATELET # BLD AUTO: 237 10E9/L (ref 150–450)
POTASSIUM SERPL-SCNC: 4 MMOL/L (ref 3.4–5.3)
RBC # BLD AUTO: 3.42 10E12/L (ref 3.8–5.2)
SODIUM SERPL-SCNC: 148 MMOL/L (ref 133–144)
SPECIMEN SOURCE: NORMAL
WBC # BLD AUTO: 6.4 10E9/L (ref 4–11)

## 2017-06-13 PROCEDURE — A9270 NON-COVERED ITEM OR SERVICE: HCPCS | Mod: GY | Performed by: PEDIATRICS

## 2017-06-13 PROCEDURE — 85610 PROTHROMBIN TIME: CPT | Performed by: INTERNAL MEDICINE

## 2017-06-13 PROCEDURE — A9270 NON-COVERED ITEM OR SERVICE: HCPCS | Mod: GY | Performed by: INTERNAL MEDICINE

## 2017-06-13 PROCEDURE — 99232 SBSQ HOSP IP/OBS MODERATE 35: CPT | Performed by: INTERNAL MEDICINE

## 2017-06-13 PROCEDURE — 25000132 ZZH RX MED GY IP 250 OP 250 PS 637: Mod: GY | Performed by: INTERNAL MEDICINE

## 2017-06-13 PROCEDURE — 25000132 ZZH RX MED GY IP 250 OP 250 PS 637: Mod: GY | Performed by: PEDIATRICS

## 2017-06-13 PROCEDURE — 80048 BASIC METABOLIC PNL TOTAL CA: CPT | Performed by: INTERNAL MEDICINE

## 2017-06-13 PROCEDURE — 97167 OT EVAL HIGH COMPLEX 60 MIN: CPT | Mod: GO

## 2017-06-13 PROCEDURE — 85027 COMPLETE CBC AUTOMATED: CPT | Performed by: INTERNAL MEDICINE

## 2017-06-13 PROCEDURE — 97535 SELF CARE MNGMENT TRAINING: CPT | Mod: GO

## 2017-06-13 PROCEDURE — 12000007 ZZH R&B INTERMEDIATE

## 2017-06-13 PROCEDURE — 36415 COLL VENOUS BLD VENIPUNCTURE: CPT | Performed by: INTERNAL MEDICINE

## 2017-06-13 PROCEDURE — 00000146 ZZHCL STATISTIC GLUCOSE BY METER IP

## 2017-06-13 PROCEDURE — 25000128 H RX IP 250 OP 636: Performed by: INTERNAL MEDICINE

## 2017-06-13 PROCEDURE — 40000133 ZZH STATISTIC OT WARD VISIT

## 2017-06-13 PROCEDURE — 25000131 ZZH RX MED GY IP 250 OP 636 PS 637: Mod: GY | Performed by: INTERNAL MEDICINE

## 2017-06-13 RX ORDER — WARFARIN SODIUM 5 MG/1
5 TABLET ORAL
Status: COMPLETED | OUTPATIENT
Start: 2017-06-13 | End: 2017-06-13

## 2017-06-13 RX ORDER — METOPROLOL TARTRATE 50 MG
50 TABLET ORAL 2 TIMES DAILY
Status: DISCONTINUED | OUTPATIENT
Start: 2017-06-13 | End: 2017-06-16 | Stop reason: HOSPADM

## 2017-06-13 RX ORDER — GABAPENTIN 300 MG/1
300 CAPSULE ORAL DAILY
Status: DISCONTINUED | OUTPATIENT
Start: 2017-06-14 | End: 2017-06-16 | Stop reason: HOSPADM

## 2017-06-13 RX ADMIN — METOLAZONE 5 MG: 2.5 TABLET ORAL at 08:14

## 2017-06-13 RX ADMIN — FERROUS SULFATE 325 MG: 325 TABLET, FILM COATED ORAL at 08:14

## 2017-06-13 RX ADMIN — GABAPENTIN 300 MG: 300 CAPSULE ORAL at 08:14

## 2017-06-13 RX ADMIN — METOPROLOL TARTRATE 50 MG: 50 TABLET, FILM COATED ORAL at 20:50

## 2017-06-13 RX ADMIN — FUROSEMIDE 10 MG/HR: 10 INJECTION, SOLUTION INTRAVENOUS at 16:53

## 2017-06-13 RX ADMIN — PRAVASTATIN SODIUM 40 MG: 20 TABLET ORAL at 08:14

## 2017-06-13 RX ADMIN — ISOSORBIDE MONONITRATE 30 MG: 30 TABLET, EXTENDED RELEASE ORAL at 08:14

## 2017-06-13 RX ADMIN — FUROSEMIDE 10 MG/HR: 10 INJECTION, SOLUTION INTRAVENOUS at 06:33

## 2017-06-13 RX ADMIN — WARFARIN SODIUM 5 MG: 5 TABLET ORAL at 17:51

## 2017-06-13 RX ADMIN — INSULIN ASPART 1 UNITS: 100 INJECTION, SOLUTION INTRAVENOUS; SUBCUTANEOUS at 11:51

## 2017-06-13 RX ADMIN — METOPROLOL TARTRATE 50 MG: 50 TABLET, FILM COATED ORAL at 08:14

## 2017-06-13 ASSESSMENT — ACTIVITIES OF DAILY LIVING (ADL): PREVIOUS_RESPONSIBILITIES: MEAL PREP;HOUSEKEEPING;LAUNDRY;DRIVING;MEDICATION MANAGEMENT

## 2017-06-13 NOTE — PLAN OF CARE
Problem: Patient Care Overview (Adult)  Goal: Plan of Care Review  Outcome: Improving  No edema in lower extremities noted. BGM: 122 and 169. Good appetite. UOP: 1,300ml this shift. Continues 10 units/hr lasix gtt. Juan LE kailash in color, dry/flaky. x1 incont loose stool this morning.  Tele: A. Fib. Will continue..

## 2017-06-13 NOTE — PROGRESS NOTES
" 06/13/17 0930   Quick Adds   Type of Visit Initial Occupational Therapy Evaluation   Living Environment   Lives With spouse;grandchild(zoe)   Living Arrangements house   Home Accessibility grab bars present (bathtub);grab bars present (toilet)   Transportation Available car;family or friend will provide   Living Environment Comment Patient lives in a house with her spouse and then her 18 year old granddaughter moved in this past week to help her out.  Patient also reports that her niece \"comes and goes\" and it willing to help her out as well.  Patient is receiving [facility] OT, PT and bath aide services prior hospital admit. Weighs self-everyday per, her report. walk in shower with built in seat, toilet safety bars. hand held shower head. Patient has shoe horn/long, and sock aide.   Self-Care   Dominant Hand right   Functional Level Prior   Ambulation 1-->assistive equipment   Transferring 1-->assistive equipment   Toileting 1-->assistive equipment   Bathing 3-->assistive equipment and person   Dressing 0-->independent   Eating 0-->independent   Communication 0-->understands/communicates without difficulty   Swallowing 0-->swallows foods/liquids without difficulty   Cognition 1 - attention or memory deficits   General Information   Onset of Illness/Injury or Date of Surgery - Date 06/12/17   Referring Physician Dr. Wong    Patient/Family Goals Statement Patient would like to get a medication reminder with an alarm and she feels that she could safely return home and manage her medical conditions   Additional Occupational Profile Info/Pertinent History of Current Problem Patient is a 77 year old female experiencing increased weakness and weight gain, excessive fluid retention limiting her ability to perform daily living tasks/activities, with increase SOB with activity.  This is her second hospitalization in the past 2 weeks.  She had been receiving per her report; home health care services including: bath " aide, nursing, PT and OT.  She has meals on wheels 5 days per week and a cleaning services 2x/month.  Medical history: CAD, DM, HTN and renal failure per EPIC chart review.  Previous hip fracture, OA and CTS in medical history; may limit her functional mobility and self-cares.   Precautions/Limitations fall precautions   Cognitive Status Examination   Orientation orientation to person, place and time   Level of Consciousness alert   Able to Follow Commands success, 2-step commands   Personal Safety (Cognitive) at risk behaviors demonstrated;decreased awareness, need for assist;decreased awareness, need for safety;decreased insight to deficits   Memory impaired   Attention Distractible during evaluation   Executive Function Self awareness/monitoring impaired;Planning ability impaired   Cognitive Comment SLUMS score 22/30 indicating possible cognitive deficits.  When provided education to patient on score and how that relates to safety at home patient was asking about what questions she got wrong and not appearing to be focused on education.     Visual Perception   Visual Perception Wears glasses   Range of Motion (ROM)   ROM Comment WFL BUE   Strength   Strength Comments WFL BUE   Coordination   Upper Extremity Coordination No deficits were identified   Bathing   Level of [place] moderate assist (50% patients effort)   Physical Assist/Nonphysical Assist 1 person assist;supervision;verbal cues;set-up required   Upper Body Dressing   Level of [place]: Dress Upper Body stand-by assist   Physical Assist/Nonphysical Assist: Dress Upper Body set-up required   Lower Body Dressing   Level of [place]: Dress Lower Body minimum assist (75% patients effort)   Physical Assist/Nonphysical Assist: Dress Lower Body verbal cues;supervision;set-up required;1 person assist   Toileting   Level of [place]: Toilet minimum assist (75% patients effort)   Physical Assist/Nonphysical Assist: Toilet verbal  "cues;supervision;1 person assist   Grooming   Level of [place]: Grooming contact guard   Physical Assist/Nonphysical Assist: Grooming supervision;verbal cues   Eating/Self Feeding   Level of [place]: Eating independent   Instrumental Activities of Daily Living (IADL)   Previous Responsibilities meal prep;housekeeping;laundry;driving;medication management   Activities of Daily Living Analysis   Impairments Contributing to Impaired Activities of Daily Living cognition impaired   General Therapy Interventions   Planned Therapy Interventions ADL retraining;home program guidelines   Clinical Impression   Criteria for Skilled Therapeutic Interventions Met yes, treatment indicated   OT Diagnosis Impaired cognition, decreased strength, endurance and safety for functional independence with BADL/IADLs.   Influenced by the following impairments edema, decreased strength/endurance   Assessment of Occupational Performance 5 or more Performance Deficits   Identified Performance Deficits dressing, bathing, functional transfers, bed mobility, meal prep/clean up, house mgt. driving   Clinical Decision Making (Complexity) High complexity   Therapy Frequency daily   Predicted Duration of Therapy Intervention (days/wks) 2 days   Anticipated Discharge Disposition Transitional Care Facility   Risks and Benefits of Treatment have been explained. Yes   Patient, Family & other staff in agreement with plan of care Yes   [facility] TM \"6 Clicks\"   2016, Trustees of [facility], under license to [employer].  All rights reserved.   6 Clicks Short Forms Daily Activity Inpatient Short Form   [facility]  \"6 Clicks\" Daily Activity Inpatient Short Form   1. Putting on and taking off regular lower body clothing? 3 - A Little   2. Bathing (including washing, rinsing, drying)? 3 - A Little   3. Toileting, which includes using toilet, bedpan or urinal? 3 - A Little   4. Putting on and taking off regular " upper body clothing? 4 - None   5. Taking care of personal grooming such as brushing teeth? 3 - A Little   6. Eating meals? 4 - None   Daily Activity Raw Score (Score out of 24.Lower scores equate to lower levels of function) 20   Total Evaluation Time   Total Evaluation Time (Minutes) 20     MCKINLEY Rollins  [facility]  [phone]

## 2017-06-13 NOTE — PROGRESS NOTES
SPIRITUAL HEALTH SERVICES  SPIRITUAL ASSESSMENT Progress Note  [facility]      (Initial Visit)  (Pt known to  from previous hospitalization.)   provided communion to pt.   is available for pt/family needs.    Mauricio Hernandez M.Div., [facility]  Staff   Office tel: [phone]

## 2017-06-13 NOTE — PHARMACY-ANTICOAGULATION SERVICE
June 12, 2017, 10:20 PM Patient s goal INR is: 2-3 for the indication of Atrial fibrillation.     Kathryn WEEKS Janettgabriela's INR level of 2.44 for today is Therapeutic for the patient.    Recommend warfarin 5 mg today.  Pharmacy will continue to monitor Kathryn Banks's labs daily and order warfarin doses to achieve specified INR goal.      Cal Pham [facility]

## 2017-06-13 NOTE — DOWNTIME EVENT NOTE
The EMR was down for 5 hours on 6/12/2017.    [email or web address] was responsible for completing the paper charting during this time period.     The following information was re-entered into the system by Meghan Mancia: meds, drugs, allergies     The following information will remain in the paper chart: complete chart is paper     Meghan Mancia  6/12/2017

## 2017-06-13 NOTE — PROGRESS NOTES
"Clinic Care Coordination Contact  OUTREACH     Clinical Concerns:  Current Medical Concerns: Met with pt while in the hospital, she was just completing her slums test with OT.  Introduced myself and pt did remember me calling a few times.  Pt states \"Karly saved my life by making me come in, I didn't want to I wanted to wait another day, but I am glad she made me come in\".  Asked about her plan at discharge and she states she is going home and \"only home\".  Pt feels she manages well at home other than forgetting to take her medications when she is suppose to.  She did mention getting a medication dispenser with an alarm on it to help her. Also suggested she take her medications the same time her  does to make sure she doesn't miss any.  Both are great plans and encouraged her to follow through this once home. In addition, [facility] tele-health was discussed as another way to help pt manage her weight. Explained this is a free service to her and I would be the one monitoring her entries/answers and would call if I saw anything concerning.  However, pt would need to remember to weight herself daily and call the number to report her weight and answer a few questions.  This would start after she is discharged and would get an initial call from the intake person at [facility] and then I would take over.  Pt was very excited to hear of this free service, she was reluctant to have to pay 50 dollars a month for a different service.  Pt agreed to calling in every day and that she did have a scale at home to weigh herself on.    Current Behavioral Concerns: Pt states she currently lives on [address] in [place]. States this was her parents cabin and it was inherited to her when they .  She then bought the cabin next to it and eventually torn down both cabins and built a home on the lot.  She is very attached to this home and location in which is understandable given family history of ownership.  However, discuss also " "the need to keep her safe which includes taking her medications correctly and not missing doses.  Pt states they built this home as handicap accessible and she rides her scooter around the house, however, Karly the home care nurse has encouraged her to use her walker more often instead of the scooter.  She currently has a niece living with her and another couple is moving in \"upstairs\" as they are having finical difficulties.  States they will be able to help her and her  if they need anything.  Pt states \"zia\" is a good cook and is diabetic so we should be eating well\".  Along with the medication dispenser pt feels she will be better and managing her medications.     Medication Management:  Plan is to get a medication dispenser with an alarm on it. In addition will be taking her medications at the same time as her .      Functional Status:  Mobility Status: Independent w/Device     Transportation:      Psychosocial:  Current living arrangement:: I live in a private home with family, I live in a private home with spouse  Financial/Insurance: no current concerns     Resources and Interventions:  Current Resources: Home Care     Advanced Care Plans/Directives on file:: Yes      Goals:   Goal 1 Statement: Take my medications without missing a doses  Barriers: memory concerns  Strengths: willing to try things that will improve her compliance.  Patient/Caregiver understanding: Pt understand the concern for keeping her safe which include taking her medications correctly.   Frequency of Care Coordination: THOM ambrosio      Plan:   RN CC will follow up on discharge from hospital.     Meenakshi Larsen RN, BSN  Care Coordination    [facility]  [address] [place] [address]  Office: [phone]  Fax [phone]   [facility] [address] [place] [address]  Office: [phone]  Fax [phone]   [email or web address]   [email or web address]     Connect with " [facility] Services on social media.

## 2017-06-13 NOTE — LETTER
[facility] Home  Complex Care Plan  About Me  Patient Name:  Kathryn Banks    YOB: 1940  Age:   77 year old   [place] MRN: [id number] Telephone Information:     Home Phone [phone]   Mobile Not on file.       Address:    [address] [address] Email address:  [email or web address]      Emergency Contact(s)  Name Relationship Lgl Grd Work Phone Home Phone Mobile Phone   1. CHAVO BANKS Spouse   [phone]    2. VENKAT LUTZ Daughter   [phone]   3. MARTÍN BASS Relative   [phone]    4. MOSHE BANKS Son               Primary language:  English     needed? No   [place] [facility]:  [phone] op. 1  Other communication barriers: Yes, as documented  Preferred Method of Communication:  Phone  Current living arrangement: I live in a private home with family, I live in a private home with spouse  Mobility Status/ Medical Equipment: Independent w/Device  Other information to know about me:    Health Maintenance  Health Maintenance Reviewed: Due/Overdue     My Access Plan  Medical Emergency 911   Primary Clinic Line [facility]- [phone]   24 Hour Appointment Line [phone] or  [phone] ([phone]) (toll-free)   24 Hour Nurse Line [phone] (toll-free)   Preferred Urgent Care Other   Preferred Hospital [facility]  [phone]   Preferred Pharmacy [employer] [facility]     Behavioral Health Crisis Line [facility], [phone] or 911     My Care Team Members  Patient Care Team       Relationship Specialty Notifications Start End    Dheeraj Jj MD PCP - General Internal Medicine  10/28/10     Phone: [phone] Fax: [phone]         [address] [place] [address]    Arash Olsen DO MD Orthopedics  1/20/14     Phone: [phone] Fax: [phone]         [address] [place]            [facility] [address]    Juanita Larsen, RN Clinic Care Coordinator  Admissions 5/17/17     Phone: [phone] Fax: [phone]             My Care Plans  Self Management and Treatment Plan  Goals and (Comments)  Goal #1: Take my medications without missing any doses          Action Plans on File: CHF  Advance Care Plans/Directives Type:   Type Advanced Care Plans/Directives: Advanced Directive - On File      My Medical and Care Information  Problem List   Patient Active Problem List   Diagnosis     Restless leg syndrome     Sleep apnea     Lipoma of other skin and subcutaneous tissue     ESOPHAGEAL REFLUX     Postsurgical aortocoronary bypass status     History of peptic ulcer disease     Chronic kidney disease, stage III (moderate)     Edema     Kidney stone     Hyperlipidemia LDL goal <100     Advanced directives, counseling/discussion     CAD - NSTEMI, CABG x 5 2007, angio in 2013 with patent grafts other than occluded graft to PL     Hx of non-ST elevation myocardial infarction (NSTEMI)     Health Care Home     Lymphedema     Anemia     Renal failure     Osteoarthritis of hip     Non morbid obesity, unspecified obesity type     Type 2 diabetes mellitus with other circulatory complications (H)     Long-term (current) use of anticoagulants [Z79.01]     Acute renal failure with tubular necrosis (H)     Essential hypertension with goal blood pressure less than 140/90     Atrial fibrillation with rapid ventricular response (H)     Rash - redness medial thighs     Metabolic acidosis, normal anion gap (NAG)     Pulmonary hypertension (H)     Chronic heart failure (H) with preserved EF     Atrial flutter (H)     Generalized edema - anasarca     Hypertensive heart and chronic kidney disease with heart failure and stage 1 through stage 4 chronic kidney disease, or unspecified chronic kidney disease (H)     Supratherapeutic INR     Proteinuria 2.1 g/g Cr     Bradycardia     Acute on chronic renal failure  (H)     Memory loss     Chronic atrial fibrillation (H)      Current Medications and Allergies:  See printed Medication Report.    Care Coordination Start Date:  6/12/17   Frequency of Care Coordination:  PRN pharos   Form Last Updated: 06/13/2017

## 2017-06-13 NOTE — PLAN OF CARE
Problem: Patient Care Overview (Adult)  Goal: Plan of Care Review  Lasix drip infusing at 10mg/hr. Good urine output (2000ml+). Mild edema present in lower extremities. Pt denies any pain or shortness of breath and has been sleeping well throughout the night. Weight down 1.5kg from admission.

## 2017-06-13 NOTE — PROGRESS NOTES
"[facility]    Hospitalist Progress Note    Date of Service (when I saw the patient): 06/13/2017    Assessment & Plan   Kathryn Banks is a 77 year old female who was admitted on 6/12/2017.     Active Problems:    Acute on chronic renal failure (H)    Assessment: has had an excellent response to IV lasix drip and metolazone and has not required albumin thus far.  Her creatinine has increased slightly.  Nephrology was contacted through the ED and they did not recommend dialysis but did recommend outpatient consult.  Her FENA is once again elevated raising concerns for ATN.     Plan: continue IV lasix and metolazone, follow renal function and intake and output closely      Type 2 diabetes mellitus with other circulatory complications (H)    Assessment: BS controlled    Plan: no change      Atrial flutter (H)    Assessment: has now been ablated    Plan: no intervention      Memory loss    Assessment: seems to be significant and was noted during her last hospital stay as well.  She remembers having cognitive evaluations previously and said she \"passed.\"  However she is asking some of the same questions repeatedly and does not seem to have any understanding of her disease process despite discussing previously    Plan: OT eval today      Chronic atrial fibrillation (H)    Assessment: EKG confirms a fib    Plan: pharmacy managing coumadin.  Will increase metoprolol slightly to bring her rate down      Restless leg syndrome    Assessment: chronic and mirapex being held    Plan: no change      CAD - NSTEMI, CABG x 5 2007, angio in 2013 with patent grafts other than occluded graft to PL    Assessment: chronic and controlled    Plan: no change      Anemia    Assessment: appears stable and suspected to be due to CKD    Plan: follow      Non morbid obesity, unspecified obesity type    Assessment: chronic    Plan: no acute intervention      Essential hypertension with goal blood pressure less than " 140/90    Assessment: BP controlled    Plan: no change      Pulmonary hypertension (H)    Assessment: chronic    Plan: no acute intervention    DVT Prophylaxis: Warfarin  Code Status: Full Code    Disposition: Expected discharge in 1 days once fluid continues to come off and output exceeds input.    Mike Wong MD    Interval History   Continues to have no complaints.  Has multiple questions and doesn't seem to understand and/or repeats questions    -Data reviewed today: I reviewed all new labs and imaging results over the last 24 hours. I personally reviewed no images or EKG's today.    Physical Exam   Temp: 97  F (36.1  C) Temp src: Oral BP: 117/74 Pulse: 114 Heart Rate: 114 Resp: 18 SpO2: 96 % O2 Device: None (Room air)    Vitals:    06/12/17 2007 06/13/17 0439   Weight: 81.5 kg (179 lb 10.8 oz) 80 kg (176 lb 5.9 oz)     Vital Signs with Ranges  Temp:  [97  F (36.1  C)-97.8  F (36.6  C)] 97  F (36.1  C)  Pulse:  [104-114] 114  Heart Rate:  [] 114  Resp:  [18-20] 18  BP: (103-162)/(65-99) 117/74  SpO2:  [87 %-99 %] 96 %  I/O last 3 completed shifts:  In: -   Out: 1250 [Urine:1250]    Lungs:  CTA auscultation throughout        Cardiovascular:   RRR with no murmur, rub or gallop     Abdomen:   +BS.  Soft, NT     Less edema in her legs and now is minimal edema throughout her trunk, arms and legs    Medications     - MEDICATION INSTRUCTIONS -       furosemide (LASIX) infusion ADULT 10 mg/hr (06/12/17 2126)     Warfarin Therapy Reminder         metoprolol  50 mg Oral BID     ferrous sulfate  325 mg Oral Daily with breakfast     gabapentin (NEURONTIN) capsule 300 mg  300 mg Oral BID     isosorbide mononitrate  30 mg Oral Daily     pravastatin (PRAVACHOL) tablet 40 mg  40 mg Oral Daily     insulin aspart  1-7 Units Subcutaneous TID AC     insulin aspart  1-5 Units Subcutaneous At Bedtime     metolazone  5 mg Oral Daily       Data     Recent Labs  Lab 06/13/17  0516 06/12/17  2141 06/12/17  1250 06/09/17  06/07/17  0740 06/06/17  0610   WBC 6.4  --  8.3  --   --  8.2   HGB 8.0*  --  8.7*  --   --  9.7*   MCV 81  --  81  --   --  79     --  253  --   --  298   INR 2.62*  --  2.44* 3.1 3.4* 2.48*   * 148* 147*  --  142 144   POTASSIUM 4.0  --  4.2  --  3.9 4.4   CHLORIDE 113*  --  114*  --  102 101   CO2 24  --  24  --  33* 35*   BUN 63*  --  60*  --  56* 63*   CR 2.78* 2.61* 2.49*  --  2.39* 2.76*   ANIONGAP 11  --  9  --  7 8   MALICK 7.7*  --  7.6*  --  8.3* 8.0*   GLC 75  --  131*  --  137* 66*   ALBUMIN  --   --  3.1*  --   --   --    PROTTOTAL  --   --  6.1*  --   --   --    BILITOTAL  --   --  0.3  --   --   --    ALKPHOS  --   --  177*  --   --   --    ALT  --   --  47  --   --   --    AST  --   --  42  --   --   --        Recent Results (from the past 24 hour(s))   XR Chest Port 1 View    Narrative    PORTABLE CHEST ONE VIEW   6/12/2017 4:38 PM     HISTORY: Bilateral lower extremity edema.    COMPARISON: Chest x-rays dated 5/31/2017.    FINDINGS: Cardiac silhouette remains enlarged even given technique.  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 Lungs are  otherwise grossly clear. No pneumothorax or significant pleural fluid  collection. No fracture.      Impression    IMPRESSION: Cardiomegaly is again noted. No other evidence of acute  cardiopulmonary disease is seen.    I discussed these findings with Dr. Morales on 6/12/2017 at  approximately 4:56 PM.

## 2017-06-13 NOTE — PHARMACY-ANTICOAGULATION SERVICE
Clinical Pharmacy - Warfarin Dosing Consult     Pharmacy has been consulted to manage this patient s warfarin therapy.  Indication: Atrial Fibrillation  Therapy Goal: INR 2-3  Warfarin Prior to Admission: Yes  Warfarin PTA Regimen: per last admission:  7.5mg FRI, 5mg the rest of the week.  Recent documented change in oral intake/nutrition: Unknown  Medication Interactions: acetaminophen    INR   Date Value Ref Range Status   06/13/2017 2.62 (H) 0.86 - 1.14 Final   06/12/2017 2.44 (H) 0.86 - 1.14 Final       Recommend warfarin 5 mg today.  Pharmacy will monitor Kathryn Banks daily and order warfarin doses to achieve specified goal.      Please contact pharmacy as soon as possible if the warfarin needs to be held for a procedure or if the warfarin goals change.

## 2017-06-13 NOTE — CONSULTS
Care Transition Initial Assessment -   Reason For Consult: discharge planning  Met with: Patient    Active Problems:    Restless leg syndrome    CAD - NSTEMI, CABG x 5 2007, angio in 2013 with patent grafts other than occluded graft to PL    Anemia    Non morbid obesity, unspecified obesity type    Type 2 diabetes mellitus with other circulatory complications (H)    Essential hypertension with goal blood pressure less than 140/90    Pulmonary hypertension (H)    Atrial flutter (H)    Acute on chronic renal failure (H)    Memory loss    Chronic atrial fibrillation (H)         DATA  Lives With: spouse, grandchild(zoe)  Living Arrangements: house  Description of Support System: Involved, Supportive  Who is your support system?: , Children  Support Assessment: Adequate family and caregiver support  Identified issues/concerns regarding health management:  Managing medications and chronic CHF     Transportation Available: car, family or friend will provide      ASSESSMENT  Cognitive Status:  awake, alert and oriented  Concerns to be addressed: Patient lives in her home with her .  She has a child and grandchild also living with them.  Patient currently has [facility] for RN.  Patient states she is getting around well here and has been walking the halls.  Patient would like to continue [facility] at discharge.     PLAN  Financial costs for the patient includes None   Patient given options and choices for discharge Yes   Patient/family is agreeable to the plan?  Yes  Patient Goals and Preferences: Home   Patient anticipates discharging to:  Home   Discharge Planner   Discharge Plans in progress: Yes  Barriers to discharge plan: Non compliance with CHF  Follow up plan: [facility]    BON Simms  [facility] [phone]/ [place] [phone]         Entered by: Sofie Ceja 06/13/2017 2:19 PM

## 2017-06-13 NOTE — PLAN OF CARE
Problem: Patient Care Overview (Adult)  Goal: Plan of Care Review  Outcome: No Change  S-(situation): Patient arrives to room 269 via cart from ED     B-(background): CHF     A-(assessment): BLE 1+ pitting edema with lower legs kailash discolored weaping and small scattered scabs and 2 open areas with base of wound yellow.  Patient forgetful.  Cee catheter placed, lasix gtt started, UA sent and FENA urine.  VSS, afebrile.  On RA LS clear, no LEON.       R-(recommendations): Orders reviewed with patient. Will monitor patient per MD orders.      Inpatient nursing criteria listed below were met:     Health care directives status obtained and documented: Yes  Core Measures assessed (SSI): Yes  SCD's Documented: No  Vaccine assessment done and vaccines ordered if appropriate: Yes  Skin issues/needs documented:Yes  Isolation needs addressed, if appropriate: Yes  Fall Prevention: Care plan updated, Education given and documented Yes  MRSA swab completed for patient 55 years and older (exclude SADIA and TKA): Yes  My Chart patient sign up addressed and documented: Yes  Care Plan initiated: Yes  Education Assessment documented:Yes  Education Documented (Pre-existing chronic infection such as, MRSA/VRE need education on admission): Yes  New medication patient education completed and documented (Possible Side Effects of Common Medications handout): Yes  Home medications if not able to send immediately home with family stored here: NA   Reminder note placed in discharge instructions: NA  Discharge planning review completed (admission navigator) Yes

## 2017-06-13 NOTE — PLAN OF CARE
"Occupational Therapy Evaluation:  Patient lives in a house with spouse, 18 year old granddaughter (recently moved in last week), and niece (who \"comes and goes\") that are able to help patient when she needs.  Patient has living facilities on main level.  Patient is currently using shower chair, grab bars, safety rails around toilet, walker for equipment.  Prior level of function consisted of independence with BADL and assistance with bathing, meals on wheels 5 days a week for supper and patient would prepare breakfast and lunch,  completes finances, has a cleaning person 2x/month, and patient has recently received home nursing, HHA, PT, and OT.    Functional status: Patient currently needing increased assistance with BADL and IADL.  Patient complete cognitive screen SLUMS score of 22/30.  This indicating possible cognitive impairments. (this score is demonstrating no changes since previous completion on 6/5/17 with that score being 21/30).      Barriers to discharge: Failure to manage medical complexities that increase safety risk for being home, level of assist needed for safety.    Equipment needs: None anticipated (if patient goes home would recommend further assessment with medication management and possible device to improve compliance with prescribed doses/times)  Discharge recommendations: TCU for continued stabilization of medical complexity and increased safety with completing BADL/IADL.  Recommend further functional cognitive testing as medical complexities stabilize for patient to participated at highest level of ability.  If patient does decline recommendation and return home again increased services in the home for medication management and further function cognitive performance to ensure safety in the home.     "

## 2017-06-14 ENCOUNTER — APPOINTMENT (OUTPATIENT)
Dept: OCCUPATIONAL THERAPY | Facility: CLINIC | Age: 77
DRG: 683 | End: 2017-06-14
Payer: MEDICARE

## 2017-06-14 PROBLEM — K92.1 BLOOD IN STOOL: Status: ACTIVE | Noted: 2017-06-14

## 2017-06-14 LAB
ABO + RH BLD: NORMAL
ABO + RH BLD: NORMAL
ANION GAP SERPL CALCULATED.3IONS-SCNC: 10 MMOL/L (ref 3–14)
BLD GP AB SCN SERPL QL: NORMAL
BLOOD BANK CMNT PATIENT-IMP: NORMAL
BUN SERPL-MCNC: 74 MG/DL (ref 7–30)
CALCIUM SERPL-MCNC: 7.6 MG/DL (ref 8.5–10.1)
CHLORIDE SERPL-SCNC: 107 MMOL/L (ref 94–109)
CO2 SERPL-SCNC: 26 MMOL/L (ref 20–32)
CREAT SERPL-MCNC: 3.36 MG/DL (ref 0.52–1.04)
CREAT UR-MCNC: 15 MG/DL
ERYTHROCYTE [DISTWIDTH] IN BLOOD BY AUTOMATED COUNT: 16.6 % (ref 10–15)
FERRITIN SERPL-MCNC: 87 NG/ML (ref 8–252)
GFR SERPL CREATININE-BSD FRML MDRD: 13 ML/MIN/1.7M2
GLUCOSE BLDC GLUCOMTR-MCNC: 101 MG/DL (ref 70–99)
GLUCOSE BLDC GLUCOMTR-MCNC: 115 MG/DL (ref 70–99)
GLUCOSE BLDC GLUCOMTR-MCNC: 139 MG/DL (ref 70–99)
GLUCOSE BLDC GLUCOMTR-MCNC: 144 MG/DL (ref 70–99)
GLUCOSE BLDC GLUCOMTR-MCNC: 182 MG/DL (ref 70–99)
GLUCOSE BLDC GLUCOMTR-MCNC: 99 MG/DL (ref 70–99)
GLUCOSE SERPL-MCNC: 97 MG/DL (ref 70–99)
HCT VFR BLD AUTO: 26.2 % (ref 35–47)
HEMOCCULT SP1 STL QL: NEGATIVE
HGB BLD-MCNC: 7.6 G/DL (ref 11.7–15.7)
HGB BLD-MCNC: 9 G/DL (ref 11.7–15.7)
INR PPP: 3.29 (ref 0.86–1.14)
INTERPRETATION ECG - MUSE: NORMAL
IRON SATN MFR SERPL: 11 % (ref 15–46)
IRON SERPL-MCNC: 26 UG/DL (ref 35–180)
MCH RBC QN AUTO: 23.2 PG (ref 26.5–33)
MCHC RBC AUTO-ENTMCNC: 29 G/DL (ref 31.5–36.5)
MCV RBC AUTO: 80 FL (ref 78–100)
PLATELET # BLD AUTO: 232 10E9/L (ref 150–450)
POTASSIUM SERPL-SCNC: 4.1 MMOL/L (ref 3.4–5.3)
PROT UR-MCNC: 0.2 G/L
PROT/CREAT 24H UR: 1.31 G/G CR (ref 0–0.2)
RBC # BLD AUTO: 3.28 10E12/L (ref 3.8–5.2)
SODIUM SERPL-SCNC: 143 MMOL/L (ref 133–144)
SPECIMEN EXP DATE BLD: NORMAL
TIBC SERPL-MCNC: 229 UG/DL (ref 240–430)
WBC # BLD AUTO: 5.9 10E9/L (ref 4–11)

## 2017-06-14 PROCEDURE — 86901 BLOOD TYPING SEROLOGIC RH(D): CPT | Performed by: PEDIATRICS

## 2017-06-14 PROCEDURE — 82274 ASSAY TEST FOR BLOOD FECAL: CPT | Performed by: PEDIATRICS

## 2017-06-14 PROCEDURE — 85018 HEMOGLOBIN: CPT | Performed by: PEDIATRICS

## 2017-06-14 PROCEDURE — 25000128 H RX IP 250 OP 636: Performed by: INTERNAL MEDICINE

## 2017-06-14 PROCEDURE — 40000133 ZZH STATISTIC OT WARD VISIT

## 2017-06-14 PROCEDURE — A9270 NON-COVERED ITEM OR SERVICE: HCPCS | Mod: GY | Performed by: INTERNAL MEDICINE

## 2017-06-14 PROCEDURE — 80048 BASIC METABOLIC PNL TOTAL CA: CPT | Performed by: INTERNAL MEDICINE

## 2017-06-14 PROCEDURE — 12000007 ZZH R&B INTERMEDIATE

## 2017-06-14 PROCEDURE — 82728 ASSAY OF FERRITIN: CPT | Performed by: PEDIATRICS

## 2017-06-14 PROCEDURE — 83550 IRON BINDING TEST: CPT | Performed by: PEDIATRICS

## 2017-06-14 PROCEDURE — 97535 SELF CARE MNGMENT TRAINING: CPT | Mod: GO

## 2017-06-14 PROCEDURE — 25000132 ZZH RX MED GY IP 250 OP 250 PS 637: Mod: GY | Performed by: INTERNAL MEDICINE

## 2017-06-14 PROCEDURE — 85610 PROTHROMBIN TIME: CPT | Performed by: INTERNAL MEDICINE

## 2017-06-14 PROCEDURE — 00000146 ZZHCL STATISTIC GLUCOSE BY METER IP

## 2017-06-14 PROCEDURE — 86850 RBC ANTIBODY SCREEN: CPT | Performed by: PEDIATRICS

## 2017-06-14 PROCEDURE — 85027 COMPLETE CBC AUTOMATED: CPT | Performed by: INTERNAL MEDICINE

## 2017-06-14 PROCEDURE — 86900 BLOOD TYPING SEROLOGIC ABO: CPT | Performed by: PEDIATRICS

## 2017-06-14 PROCEDURE — 36415 COLL VENOUS BLD VENIPUNCTURE: CPT | Performed by: INTERNAL MEDICINE

## 2017-06-14 PROCEDURE — 84156 ASSAY OF PROTEIN URINE: CPT | Performed by: PEDIATRICS

## 2017-06-14 PROCEDURE — 83540 ASSAY OF IRON: CPT | Performed by: PEDIATRICS

## 2017-06-14 PROCEDURE — 99233 SBSQ HOSP IP/OBS HIGH 50: CPT | Performed by: PEDIATRICS

## 2017-06-14 RX ADMIN — METOPROLOL TARTRATE 50 MG: 50 TABLET, FILM COATED ORAL at 08:26

## 2017-06-14 RX ADMIN — METOPROLOL TARTRATE 50 MG: 50 TABLET, FILM COATED ORAL at 20:58

## 2017-06-14 RX ADMIN — METOLAZONE 5 MG: 2.5 TABLET ORAL at 08:25

## 2017-06-14 RX ADMIN — PRAVASTATIN SODIUM 40 MG: 20 TABLET ORAL at 08:24

## 2017-06-14 RX ADMIN — FERROUS SULFATE 325 MG: 325 TABLET, FILM COATED ORAL at 08:24

## 2017-06-14 RX ADMIN — ISOSORBIDE MONONITRATE 30 MG: 30 TABLET, EXTENDED RELEASE ORAL at 08:25

## 2017-06-14 RX ADMIN — GABAPENTIN 300 MG: 300 CAPSULE ORAL at 08:24

## 2017-06-14 RX ADMIN — FUROSEMIDE 10 MG/HR: 10 INJECTION, SOLUTION INTRAVENOUS at 02:24

## 2017-06-14 RX ADMIN — FUROSEMIDE 10 MG/HR: 10 INJECTION, SOLUTION INTRAVENOUS at 15:35

## 2017-06-14 RX ADMIN — INSULIN ASPART 1 UNITS: 100 INJECTION, SOLUTION INTRAVENOUS; SUBCUTANEOUS at 17:28

## 2017-06-14 NOTE — PLAN OF CARE
Problem: Patient Care Overview (Adult)  Goal: Discharge Needs Assessment  Outcome: Therapy, progress towards functional goals is fair  Up in chair and walked in halls with SBA. Ate well. Was given diabetes education booklet after pt asked several questions about diabetic diet. Writer also left message for dietician to see pt, and dietician came and instructed pt. Urine output of 1250ml cleat pale urine  from moreira catheter. Diminished lungs are clear. Forgetful/ vague. Lasix drip continues. Hgb was 7.6 this morning and pt was started on po iron. 1+ swelling in BLE. Telemetry was SR before it was DCed. Will continue to monitor I&O, labs, safety, activity tolerance, lung sounds.

## 2017-06-14 NOTE — PROGRESS NOTES
[facility]    Hospitalist Progress Note    Date of Service (when I saw the patient): 06/14/2017    Assessment & Plan   Kathryn Banks is a 77 year old female who was admitted on 6/12/2017 with concern for volume overload due to worsening acute renal failure superimposed upon chronic kidney disease.  Signs of volume overload are starting to improve with aggressive diuresis including oral metolazone and continuous Lasix infusion with good urine output on that regimen.  However, renal function has worsened, she has become more uremic, and fractional excretion of sodium is significantly elevated consistent with acute tubular necrosis.  This raises concern for high risk of rapidly progressive worsening renal function and associated potential complications of acute renal failure including volume overload, hyperkalemia, acidosis, and uremia.  Additionally, she has become progressively more anemic during this hospital stay and does report that she had rectal bleeding at the time of admission.  Although this is grossly subsided, cause for rectal bleeding remains inapparent and ongoing GI bleeding cannot be completely excluded.  Furthermore, she is receiving therapeutic anticoagulation which increases her risk for recurrent or worsening bleeding.  Worsening anemia could exacerbate acute renal failure, and GI bleeding could exacerbate uremia.  She had initial atrial flutter in the emergency room followed by atrial fibrillation for about 24 hours, but has now been in normal sinus rhythm for about 24 hours.  Blood pressure has been stable so far, and CAD, chronic heart failure and pulmonary hypertension appear to be stable.  Glycemic control associated with type 2 diabetes has been adequate so far.  Chronic memory problems are suspected, but cognitive function is stable as compared with evaluation at the time of her hospitalization 1-2 weeks ago, and an acute delirium or encephalopathy is not  suspected clinically at this time.    Principal Problem:    Acute on chronic renal failure (H)  Active Problems:    Anemia    Type 2 diabetes mellitus with other circulatory complications (H)    Pulmonary hypertension (H)    Atrial flutter (H) - present 6/12    Memory loss    Chronic atrial fibrillation (H) on 6/12-6/13    Blood in stool    Restless leg syndrome    CAD - NSTEMI, CABG x 5 2007, angio in 2013 with patent grafts other than occluded graft to PL    Non morbid obesity, unspecified obesity type    Essential hypertension with goal blood pressure less than 140/90    Continue Lasix infusion and metolazone  Continue Cee catheter to monitor urine output closely  Follow renal function closely, check urine protein  Follow serial hemoglobin levels and iron levels and check stool for occult blood  send blood for type and screen and reconsider blood transfusion if anemia continues to worsen particularly for hemoglobin less than 7, briefly discussed possible need for blood transfusion with the patient today who appeared to be agreeable if it becomes medically necessary  Continue oral iron for now but discussed possible administration of IV iron therapy depending upon results of testing for iron deficiency  Discontinue telemetry  Adjust warfarin dosing per recommendations from pharmacy  Discussed discharge planning including possible discharge to transitional care unit for anticipated short-term rehabilitation  Discussed memory problems with occupational therapy who performed her cognitive evaluation, recommend additional cognitive evaluation as an outpatient after discharge    DVT Prophylaxis: Warfarin  Code Status: Full Code    Disposition: Expected discharge in 2-3 days.    Arash Silva MD    Interval History   She says she feels fine.  She has no complaints today.  She denies dyspnea although has not been particularly active.  She has not noticed much swelling.  She has been afebrile.  Heart rate was  tachycardic yesterday but has returned to the normal range overnight and today.  Blood pressure has been normal.  Oxygenation has been normal.  Oral intake has been adequate, and she has been asking for specific information about a diabetic diet indicating that she has never received any information about a diabetic diet although she has been treated for diabetes for many years.  Her reliability as an historian is questionable in this regard.  Today she says that she did have quite a bit of bleeding from her bottom when she was admitted to the hospital.  That blood was red and was mixed in with the stool.  She did not have anorectal or abdominal pain.  She attributed the bleeding to hemorrhoids.  That bleeding has subsequently stopped.  She has had ongoing loose stools without obvious blood and denies melena.  Urine output has been good.    -Data reviewed today: I reviewed all new labs and imaging results over the last 24 hours. I personally reviewed her ER cardiac tracing that demonstrated atrial flutter with flutter ways obvious in lead V1.  I also reviewed her telemetry strips throughout her hospital stay.  Atrial flutter appeared to have subsided after initial presence in the emergency room, but she had persistent atrial fibrillation June 12 to June 13.  However, as of mid afternoon on June 13, she appeared to have returned to sinus rhythm and today remains in sinus rhythm.    Physical Exam   Temp: 97.9  F (36.6  C) Temp src: Oral BP: 109/60 Pulse: 61 Heart Rate: 62 Resp: 16 SpO2: 95 % O2 Device: None (Room air)    Vitals:    06/12/17 2007 06/13/17 0439 06/14/17 0330   Weight: 81.5 kg (179 lb 10.8 oz) 80 kg (176 lb 5.9 oz) 79.4 kg (175 lb 0.7 oz)     Old records were reviewed for comparison.  On June 6 which was the day of hospital discharge previously, her weight was 168 pounds and considered to be her dry weight at that time.    Vital Signs with Ranges  Temp:  [96.5  F (35.8  C)-98.5  F (36.9  C)] 97.9  F  (36.6  C)  Pulse:  [61-76] 61  Heart Rate:  [62-76] 62  Resp:  [16-20] 16  BP: (109-121)/(59-67) 109/60  SpO2:  [95 %-97 %] 95 %  I/O last 3 completed shifts:  In: 1095.9 [P.O.:860; I.V.:235.9]  Out: 4510 [Urine:4510]    Constitutional: No acute distress sitting in a chair  Respiratory: Normal respiratory effort, good air movement, few inspiratory crackles at the lung bases, no wheezes  Cardiovascular: Regular rate and rhythm, no gallop or murmur, good radial pulse, brisk capillary refill  GI: Nondistended abdomen, soft without abdominal tenderness  Skin/Integumen: No rashes  Neuro: Alert and maintains wakefulness and attention, does appear to have memory problems recalling distant information, able to follow simple instructions, speech is understandable  Other: Mild pitting edema evident in the lower extremity bilaterally    Medications     - MEDICATION INSTRUCTIONS -       furosemide (LASIX) infusion ADULT 10 mg/hr (06/14/17 0224)     Warfarin Therapy Reminder         warfarin-No DOSE today  1 each Does not apply no dose today (warfarin)     metoprolol  50 mg Oral BID     gabapentin (NEURONTIN) capsule 300 mg  300 mg Oral Daily     ferrous sulfate  325 mg Oral Daily with breakfast     isosorbide mononitrate  30 mg Oral Daily     pravastatin (PRAVACHOL) tablet 40 mg  40 mg Oral Daily     insulin aspart  1-7 Units Subcutaneous TID AC     insulin aspart  1-5 Units Subcutaneous At Bedtime     metolazone  5 mg Oral Daily       Data   Data reviewed today:  I personally reviewed telemetry.    Recent Labs  Lab 06/14/17  0551 06/13/17  0516 06/12/17  2141 06/12/17  1250   WBC 5.9 6.4  --  8.3   HGB 7.6* 8.0*  --  8.7*   MCV 80 81  --  81    237  --  253   INR 3.29* 2.62*  --  2.44*    148* 148* 147*   POTASSIUM 4.1 4.0  --  4.2   CHLORIDE 107 113*  --  114*   CO2 26 24  --  24   BUN 74* 63*  --  60*   CR 3.36* 2.78* 2.61* 2.49*   ANIONGAP 10 11  --  9   MALICK 7.6* 7.7*  --  7.6*   GLC 97 75  --  131*   ALBUMIN   --   --   --  3.1*   PROTTOTAL  --   --   --  6.1*   BILITOTAL  --   --   --  0.3   ALKPHOS  --   --   --  177*   ALT  --   --   --  47   AST  --   --   --  42     Fractional excretion of sodium this hospital stay was 14.3%.  Urine sodium was 115.  Urinalysis demonstrated moderate blood.    Old records are reviewed with respect to testing for iron deficiency.  On August 20, 2012, serum iron was low at 32 with iron saturation 11%.  It does not appear as though iron studies have been repeated since that time.

## 2017-06-14 NOTE — PLAN OF CARE
Problem: Patient Care Overview (Adult)  Goal: Discharge Needs Assessment  Outcome: Therapy, progress toward functional goals as expected  Lasix drip continues. Clear straw-colored urine in moreira catheter. 1085ml out for the first four hours of this shift.Slight swelling in BLE. Up to bathroom to have small BM without shortness of breath. Clear lungs. Telemetry showed NSR at start of shift.Patient would like to eat much more than prescribed diet allows. Explained rationale of prescribed diet to pt, and will continue to reinforce this. Will continue to monitor I&O, telemetry, lung sounds, swelling in BLE, diet.

## 2017-06-14 NOTE — PROGRESS NOTES
Suspect this am hgb result was likely erroneous since the repeat this evening is consistent with her more recent values and her iron studies are consistent with anemia of chronic disease which would be consistent with her CKD.  Will stop every 12 hour hgb checks but check again tomorrow morning.  electronically signed by Mike Wong M.D.

## 2017-06-14 NOTE — PHARMACY-ANTICOAGULATION SERVICE
Clinical Pharmacy - Warfarin Dosing Consult     Pharmacy has been consulted to manage this patient s warfarin therapy.  Indication: Atrial Fibrillation  Therapy Goal: INR 2-3  Warfarin Prior to Admission: Yes  Warfarin PTA Regimen: per last admission:  7.5mg FRI, 5mg the rest of the week.  Recent documented change in oral intake/nutrition: Unknown    INR   Date Value Ref Range Status   06/14/2017 3.29 (H) 0.86 - 1.14 Final   06/13/2017 2.62 (H) 0.86 - 1.14 Final       Recommend HOLDING warfarin today, due to INR increase of 0.67 in 24 hours.  Pharmacy will monitor Kathryn Banks daily and order warfarin doses to achieve specified goal.      Please contact pharmacy as soon as possible if the warfarin needs to be held for a procedure or if the warfarin goals change.

## 2017-06-14 NOTE — PROGRESS NOTES
"Reason for Assessment:  Nutrition education regarding Type 2 Diabetes Nutrition   Diet History:  Pt reports having some education regarding nutrition and diabetes when she was first diagnosed, but did not recall much of what she learned. She said \"maybe it'll come back to me when I'm not in here too.\" She had just finished eating her lunch meal and ate almost everything. Pt had several questions for writer during conversation.   Nutrition Diagnosis:  Food- and nutrition-related knowledge deficit r/t little previous knowledge of Type 2 Diabetes Diet.   Interventions:  Provided instruction on Type 2 Diabetes Nutrition; carb counting, complex and simple carbs; 15gm = 1 carb choice/serving; recommended 3-5 carb choices at each meal and 1-2 carb choices for snacks; explained importance of consistent carb intake throughout day to regular blood sugar levels  Provided handouts Type 2 Diabetes Nutrition Therapy  Goals:   Patient will verbalize understanding of Type 2 Diabetes Nutrition   Follow-up:    Patient to ask any further nutrition-related questions before discharge.  In addition, pt may request outpatient RD appointment.    Karma oCllier RD, LD  Clinical Dietitian  [phone]      "

## 2017-06-14 NOTE — PLAN OF CARE
Problem: Patient Care Overview (Adult)  Goal: Plan of Care Review  Pt denies SOA. Lungs are diminished. Pt is on RA with O2 sats mid-high 90s. Tele is in NSR. Has 1+ edema to legs, skin feels tight and are kailash in color. Pt diuresing well with lasix gtt, 1425mL UOP thus far in night, weight is down 3 lbs. Will continue to monitor respiratory status, tele, I and O.

## 2017-06-14 NOTE — PLAN OF CARE
CTS update: Writer talked with patient about home versus ST rehab Patient is in agreement to go to the nursing home 1st request [facility]  and the 2nd is [place] or [facility]. Referrals sent. Writer spoke with [facility] care nurse Leona who cares for patient in the home and she felt strongly about patient going to the nursing home for some rehab.

## 2017-06-14 NOTE — PLAN OF CARE
Occupational Therapy     Goal status:   1:  Patient completed LB dressing with use of sock aid SBA for safety.    2:  Patient complete SBA for safety and use of safety rails.    3:  Education with patient on recommendations.  Patient reported that she would consider TCU placement although had questions.  Referred her questions to care transitions (cost, openings, etc.) Patient also report an additional couple living at her home that care transitions reports has been reported as not situation.      Functional status: Patient currently presents as SBA for safety with BADL's.  She is able to report concerns about cognitive assessment recommendations and reports back what the recommendations are.  She was accepting of information that the cognitive assessment is to determine her high potential and then can determine safe/appropriate assistance from there.     Areas of progress:  Level of independence and understanding of cognitive recommendations.     Barrier to discharge Home: cognition, safety, inconsistencies reporting of home environment and how much assistance she has     Equipment needs: none anticipated    Discharge disposition/rationale: TCU for continued assessment of functional cognitive performance to determine safe recommendation for least restrictive living environment.  If patient does decide to go home increased supports with medication management, meal planning/preparation, and close monitoring of medical conditions (patient reports  does finances and if he does not than she would need assistance with managing finances).      Transport recommendations: private vehicle    Occupational Therapy Discharge Summary    Reason for therapy discharge:    All goals and outcomes met, no further needs identified.    Progress towards therapy goal(s). See goals on Care Plan in [facility] electronic health record for goal details.  Goals met    Therapy recommendation(s):    TCU for continued assessment of functional  cognitive performance to determine safe recommendation for least restrictive living environment.  If patient does decide to go home increased supports with medication management, meal planning/preparation, and close monitoring of medical conditions (patient reports  does finances and if he does not than she would need assistance with managing finances).

## 2017-06-15 LAB
ANION GAP SERPL CALCULATED.3IONS-SCNC: 13 MMOL/L (ref 3–14)
BUN SERPL-MCNC: 89 MG/DL (ref 7–30)
CALCIUM SERPL-MCNC: 7.3 MG/DL (ref 8.5–10.1)
CHLORIDE SERPL-SCNC: 105 MMOL/L (ref 94–109)
CO2 SERPL-SCNC: 24 MMOL/L (ref 20–32)
CREAT SERPL-MCNC: 3.73 MG/DL (ref 0.52–1.04)
GFR SERPL CREATININE-BSD FRML MDRD: 12 ML/MIN/1.7M2
GLUCOSE SERPL-MCNC: 150 MG/DL (ref 70–99)
HGB BLD-MCNC: 7.9 G/DL (ref 11.7–15.7)
INR PPP: 3.82 (ref 0.86–1.14)
POTASSIUM SERPL-SCNC: 4.3 MMOL/L (ref 3.4–5.3)
SODIUM SERPL-SCNC: 142 MMOL/L (ref 133–144)

## 2017-06-15 PROCEDURE — 25000132 ZZH RX MED GY IP 250 OP 250 PS 637: Mod: GY | Performed by: INTERNAL MEDICINE

## 2017-06-15 PROCEDURE — 12000000 ZZH R&B MED SURG/OB

## 2017-06-15 PROCEDURE — 25000128 H RX IP 250 OP 636: Performed by: INTERNAL MEDICINE

## 2017-06-15 PROCEDURE — 25000132 ZZH RX MED GY IP 250 OP 250 PS 637: Mod: GY | Performed by: PEDIATRICS

## 2017-06-15 PROCEDURE — 00000146 ZZHCL STATISTIC GLUCOSE BY METER IP: Performed by: INTERNAL MEDICINE

## 2017-06-15 PROCEDURE — A9270 NON-COVERED ITEM OR SERVICE: HCPCS | Mod: GY | Performed by: PEDIATRICS

## 2017-06-15 PROCEDURE — 25000128 H RX IP 250 OP 636: Performed by: PEDIATRICS

## 2017-06-15 PROCEDURE — 85018 HEMOGLOBIN: CPT | Performed by: INTERNAL MEDICINE

## 2017-06-15 PROCEDURE — 80048 BASIC METABOLIC PNL TOTAL CA: CPT | Performed by: INTERNAL MEDICINE

## 2017-06-15 PROCEDURE — 36415 COLL VENOUS BLD VENIPUNCTURE: CPT | Performed by: INTERNAL MEDICINE

## 2017-06-15 PROCEDURE — 99233 SBSQ HOSP IP/OBS HIGH 50: CPT | Performed by: PEDIATRICS

## 2017-06-15 PROCEDURE — 85610 PROTHROMBIN TIME: CPT | Performed by: INTERNAL MEDICINE

## 2017-06-15 PROCEDURE — A9270 NON-COVERED ITEM OR SERVICE: HCPCS | Mod: GY | Performed by: INTERNAL MEDICINE

## 2017-06-15 RX ORDER — NITROGLYCERIN 0.4 MG/1
0.4 TABLET SUBLINGUAL EVERY 5 MIN PRN
Qty: 25 TABLET | Refills: 0 | Status: ON HOLD | DISCHARGE
Start: 2017-06-15 | End: 2017-08-18

## 2017-06-15 RX ORDER — METOPROLOL TARTRATE 25 MG/1
50 TABLET, FILM COATED ORAL 2 TIMES DAILY
Qty: 60 TABLET | Refills: 0 | DISCHARGE
Start: 2017-06-15 | End: 2017-08-01

## 2017-06-15 RX ORDER — ISOSORBIDE MONONITRATE 30 MG/1
30 TABLET, EXTENDED RELEASE ORAL DAILY
Status: ON HOLD | DISCHARGE
Start: 2017-06-15 | End: 2017-07-11

## 2017-06-15 RX ORDER — ACETAMINOPHEN 325 MG/1
650 TABLET ORAL EVERY 4 HOURS PRN
Qty: 100 TABLET | Status: ON HOLD | DISCHARGE
Start: 2017-06-15 | End: 2017-08-18

## 2017-06-15 RX ORDER — GABAPENTIN 300 MG/1
300 CAPSULE ORAL AT BEDTIME
Status: ON HOLD | DISCHARGE
Start: 2017-06-15 | End: 2017-08-18

## 2017-06-15 RX ORDER — WARFARIN SODIUM 2.5 MG/1
2.5 TABLET ORAL
Status: COMPLETED | OUTPATIENT
Start: 2017-06-15 | End: 2017-06-15

## 2017-06-15 RX ORDER — BUMETANIDE 0.5 MG/1
2 TABLET ORAL DAILY
Status: DISCONTINUED | OUTPATIENT
Start: 2017-06-15 | End: 2017-06-16 | Stop reason: HOSPADM

## 2017-06-15 RX ORDER — FERROUS SULFATE 325(65) MG
325 TABLET ORAL
Qty: 100 TABLET | Status: ON HOLD | DISCHARGE
Start: 2017-06-15 | End: 2017-08-18

## 2017-06-15 RX ORDER — PRAVASTATIN SODIUM 40 MG
40 TABLET ORAL DAILY
Qty: 30 TABLET | DISCHARGE
Start: 2017-06-15 | End: 2017-06-28

## 2017-06-15 RX ORDER — PRAMIPEXOLE DIHYDROCHLORIDE 0.5 MG/1
1.5 TABLET ORAL EVERY EVENING
Status: ON HOLD | DISCHARGE
Start: 2017-06-15 | End: 2017-08-18

## 2017-06-15 RX ADMIN — METOLAZONE 5 MG: 2.5 TABLET ORAL at 09:12

## 2017-06-15 RX ADMIN — INSULIN ASPART 2 UNITS: 100 INJECTION, SOLUTION INTRAVENOUS; SUBCUTANEOUS at 11:51

## 2017-06-15 RX ADMIN — FUROSEMIDE 10 MG/HR: 10 INJECTION, SOLUTION INTRAVENOUS at 00:15

## 2017-06-15 RX ADMIN — PRAVASTATIN SODIUM 40 MG: 20 TABLET ORAL at 09:12

## 2017-06-15 RX ADMIN — WARFARIN SODIUM 2.5 MG: 2.5 TABLET ORAL at 18:32

## 2017-06-15 RX ADMIN — BUMETANIDE 2 MG: 0.5 TABLET ORAL at 13:29

## 2017-06-15 RX ADMIN — METOPROLOL TARTRATE 50 MG: 50 TABLET, FILM COATED ORAL at 21:27

## 2017-06-15 RX ADMIN — GABAPENTIN 300 MG: 300 CAPSULE ORAL at 09:12

## 2017-06-15 RX ADMIN — METOPROLOL TARTRATE 50 MG: 50 TABLET, FILM COATED ORAL at 09:12

## 2017-06-15 RX ADMIN — ISOSORBIDE MONONITRATE 30 MG: 30 TABLET, EXTENDED RELEASE ORAL at 09:13

## 2017-06-15 RX ADMIN — IRON SUCROSE 200 MG: 20 INJECTION, SOLUTION INTRAVENOUS at 13:48

## 2017-06-15 RX ADMIN — FERROUS SULFATE 325 MG: 325 TABLET, FILM COATED ORAL at 07:45

## 2017-06-15 NOTE — PROGRESS NOTES
[facility]    Hospitalist Progress Note    Date of Service (when I saw the patient): 06/15/2017    Assessment & Plan   Kathryn Banks is a 77 year old female who was admitted on 6/12/2017 with concern for worsening volume overload due to acute on chronic renal failure.  Acute tubular necrosis is suspected although cause remains indeterminate.  Urine output has been excellent with aggressive diuretic therapy, and she has not had hyperkalemia or acidosis.  She remains moderately to severely uremic.  Volume status is now similar to if not improved as compared with the time of her recent hospital discharge.  She is persistently anemic with iron deficiency and suspected anemia of chronic kidney disease.  No active ongoing bleeding is appreciated although she reported rectal bleeding at the beginning of her hospital stay.  Chronic cardiac problems including dysrhythmia, CHF, pulmonary hypertension, and CAD have remained stable over the last 24 hours.  Blood sugars have been well controlled.  INR is therapeutic on warfarin.    Principal Problem:    Acute on chronic renal failure (H)  Active Problems:    Iron deficiency anemia    Type 2 diabetes mellitus with other circulatory complications (H)    Pulmonary hypertension (H)    Chronic heart failure (H) with preserved EF    Atrial flutter (H) - present 6/12    Memory loss    Chronic atrial fibrillation (H) on 6/12-6/13    Restless leg syndrome    CAD - NSTEMI, CABG x 5 2007, angio in 2013 with patent grafts other than occluded graft to PL    Anemia of chronic renal failure, stage 4 (severe) (H)    Non morbid obesity, unspecified obesity type    Essential hypertension with goal blood pressure less than 140/90    Blood in stool    Discontinue IV Lasix infusion and start oral Bumex  Discontinue metolazone, anticipate use of metolazone less frequently after hospital discharge rather than daily  Continue Cee catheter for another 24 hours to  monitor urine output  Administer IV Venofer today and daily until hospital discharge, continue low-dose oral iron therapy  Recommended outpatient nephrology consultation with which the patient was agreeable  Discussed long-term prognosis for recovery of renal function which does not appear to be good at this time, and there is suspicion that she will require renal replacement therapy in the near future as discussed with her today  Discussed disposition planning, anticipate discharge to TCU for short-term rehabilitation at discharge and ongoing discussions about possible long-term care    DVT Prophylaxis: Warfarin with dosing adjusted by pharmacy depending on INR  Code Status: Full Code    Disposition: Expected discharge in 1-2 days.    Total floor time today 35-40 minutes including 20 minutes spent in direct face-to-face time counseling and discussion with the patient.  Her questions and concerns regarding kidney failure were answered and addressed.  Arash Silva MD    Interval History   She feels better today overall.  She feels less swollen.  She denies dyspnea.  She has some left mid to lower back pain that is mild.  She has been afebrile.  She has been stable hemodynamically.  Oxygenation has been normal.  Urine output has been excellent.  Oral intake has been adequate.  She is ambulating though feels somewhat weak.  She has not noted any recurrent rectal bleeding.  She has no new complaints today.  However, she has multiple questions with respect to her kidney problems.    -Data reviewed today: I reviewed all new labs and imaging results over the last 24 hours. I personally reviewed no images or EKG's today.    Physical Exam   Temp: 98  F (36.7  C) Temp src: Oral BP: 104/55 Pulse: 67 Heart Rate: 67 Resp: 18 SpO2: 95 % O2 Device: None (Room air)    Vitals:    06/13/17 0439 06/14/17 0330 06/15/17 0300   Weight: 80 kg (176 lb 5.9 oz) 79.4 kg (175 lb 0.7 oz) 75.3 kg (166 lb 0.1 oz)     Vital Signs with  Ranges  Temp:  [97.8  F (36.6  C)-98  F (36.7  C)] 98  F (36.7  C)  Pulse:  [65-80] 67  Heart Rate:  [65-80] 67  Resp:  [18-20] 18  BP: (104-122)/(46-62) 104/55  SpO2:  [95 %-97 %] 95 %  I/O last 3 completed shifts:  In: 558.7 [P.O.:340; I.V.:218.7]  Out: 4780 [Urine:4780]    Constitutional: No acute distress sitting in a chair  Respiratory: Normal respiratory effort, clear lungs  Cardiovascular: Regular rate and rhythm    Medications     - MEDICATION INSTRUCTIONS -       furosemide (LASIX) infusion ADULT 10 mg/hr (06/15/17 0015)     Warfarin Therapy Reminder         warfarin  2.5 mg Oral ONCE at 18:00     metoprolol  50 mg Oral BID     gabapentin (NEURONTIN) capsule 300 mg  300 mg Oral Daily     ferrous sulfate  325 mg Oral Daily with breakfast     isosorbide mononitrate  30 mg Oral Daily     pravastatin (PRAVACHOL) tablet 40 mg  40 mg Oral Daily     insulin aspart  1-7 Units Subcutaneous TID AC     insulin aspart  1-5 Units Subcutaneous At Bedtime     metolazone  5 mg Oral Daily       Data   Data reviewed today:  I personally reviewed no images or EKG's today.    Recent Labs  Lab 06/15/17  0527 06/14/17  1750 06/14/17  0551 06/13/17  0516  06/12/17  1250   WBC  --   --  5.9 6.4  --  8.3   HGB 7.9* 9.0* 7.6* 8.0*  --  8.7*   MCV  --   --  80 81  --  81   PLT  --   --  232 237  --  253   INR 3.82*  --  3.29* 2.62*  --  2.44*     --  143 148*  < > 147*   POTASSIUM 4.3  --  4.1 4.0  --  4.2   CHLORIDE 105  --  107 113*  --  114*   CO2 24  --  26 24  --  24   BUN 89*  --  74* 63*  --  60*   CR 3.73*  --  3.36* 2.78*  < > 2.49*   ANIONGAP 13  --  10 11  --  9   MALICK 7.3*  --  7.6* 7.7*  --  7.6*   *  --  97 75  --  131*   ALBUMIN  --   --   --   --   --  3.1*   PROTTOTAL  --   --   --   --   --  6.1*   BILITOTAL  --   --   --   --   --  0.3   ALKPHOS  --   --   --   --   --  177*   ALT  --   --   --   --   --  47   AST  --   --   --   --   --  42   < > = values in this interval not displayed.    Stool  negative for occult blood  Some iron 26 with iron saturation 11% both of which are low, ferritin 87    Urine protein 1.3 g/g creatinine

## 2017-06-15 NOTE — PHARMACY-ANTICOAGULATION SERVICE
Clinical Pharmacy - Warfarin Dosing Consult     Pharmacy has been consulted to manage this patient s warfarin therapy.  Indication: Atrial Fibrillation  Therapy Goal: INR 2-3  Warfarin Prior to Admission: Yes  Warfarin PTA Regimen: per last admission:  7.5mg FRI, 5mg the rest of the week.  Recent documented change in oral intake/nutrition: Unknown    INR   Date Value Ref Range Status   06/15/2017 3.82 (H) 0.86 - 1.14 Final   06/14/2017 3.29 (H) 0.86 - 1.14 Final       Recommend warfarin 2.5 mg today. Patient's dose from yesterday was held. Will restart dose today at a 50% reduction from the previous dose of 5mg. Pharmacy will monitor Kathryn Banks daily and order warfarin doses to achieve specified goal.      Please contact pharmacy as soon as possible if the warfarin needs to be held for a procedure or if the warfarin goals change.      Ricardo Noriega, PharmD. Pharmacy Resident

## 2017-06-15 NOTE — PLAN OF CARE
Problem: Goal Outcome Summary  Goal: Goal Outcome Summary  Outcome: Improving  Lungs are diminished but clear. On RA with O2 sats 95%. Diuresing well with Lasix IV gtt, see I and O. Has 1+ edema to BLE. Pt has been up to the bathroom and had two loose stools overnight. Vss. Will continue with plan of care, monitor I and O.

## 2017-06-15 NOTE — PLAN OF CARE
Problem: Goal Outcome Summary  Goal: Goal Outcome Summary  Outcome: Improving  Patient's BGs were 182 at dinner, 115 @ 2100.  Hgb this pm 9.0 which is up from 7.6 earlier today and occult blood stool came back negative.  Lungs clear.  Has trace pitting edema to BLEs, lower legs kailash/red.

## 2017-06-15 NOTE — PLAN OF CARE
Problem: Goal Outcome Summary  Goal: Goal Outcome Summary  Outcome: Therapy, progress towards functional goals is fair  Discontinued Lasix drip this shift and started on Bumex. 1185ml pale yellow urine emptied from catheter bag this shift. Still some swelling in BLE. Clear lungs. Ambulated in harmon with walker. Ate well. Had medium BM. Forgetful. Hemoglobin was 7.9 this morning and is receiving Venofer now after receiving oral iron this morning. Creatinine was 3.73, and INR was 3.82. Will continue to monitor labs, edema, lung sounds, I&O, safety.

## 2017-06-15 NOTE — PROGRESS NOTES
Pt talked staff into taking her to the gift shop to buy a nail file. Pt bought a nail file, polish remover, and two small bags of chips. Writer put the sack of items in pt's closet after she returned to the room to use the bathroom. Pt then requested the sack to retrieve her polish remover. She took the remover out and insisted on keeping the sack with her which contained the chips. Pt has been instructed on the importance of sticking to her diet for her health. Will continue to encourage adherence to diet.

## 2017-06-16 VITALS
SYSTOLIC BLOOD PRESSURE: 117 MMHG | OXYGEN SATURATION: 95 % | WEIGHT: 161 LBS | HEART RATE: 65 BPM | HEIGHT: 64 IN | TEMPERATURE: 98 F | RESPIRATION RATE: 18 BRPM | DIASTOLIC BLOOD PRESSURE: 65 MMHG | BODY MASS INDEX: 27.49 KG/M2

## 2017-06-16 LAB
ANION GAP SERPL CALCULATED.3IONS-SCNC: 10 MMOL/L (ref 3–14)
BUN SERPL-MCNC: 94 MG/DL (ref 7–30)
CALCIUM SERPL-MCNC: 7.3 MG/DL (ref 8.5–10.1)
CHLORIDE SERPL-SCNC: 107 MMOL/L (ref 94–109)
CO2 SERPL-SCNC: 26 MMOL/L (ref 20–32)
CREAT SERPL-MCNC: 4.03 MG/DL (ref 0.52–1.04)
GFR SERPL CREATININE-BSD FRML MDRD: 11 ML/MIN/1.7M2
GLUCOSE SERPL-MCNC: 124 MG/DL (ref 70–99)
HGB BLD-MCNC: 9 G/DL (ref 11.7–15.7)
INR PPP: 3.43 (ref 0.86–1.14)
POTASSIUM SERPL-SCNC: 4.4 MMOL/L (ref 3.4–5.3)
SODIUM SERPL-SCNC: 143 MMOL/L (ref 133–144)

## 2017-06-16 PROCEDURE — 25000132 ZZH RX MED GY IP 250 OP 250 PS 637: Mod: GY | Performed by: INTERNAL MEDICINE

## 2017-06-16 PROCEDURE — 25000132 ZZH RX MED GY IP 250 OP 250 PS 637: Mod: GY | Performed by: PEDIATRICS

## 2017-06-16 PROCEDURE — 99239 HOSP IP/OBS DSCHRG MGMT >30: CPT | Performed by: PEDIATRICS

## 2017-06-16 PROCEDURE — 80048 BASIC METABOLIC PNL TOTAL CA: CPT | Performed by: INTERNAL MEDICINE

## 2017-06-16 PROCEDURE — 85018 HEMOGLOBIN: CPT | Performed by: INTERNAL MEDICINE

## 2017-06-16 PROCEDURE — 36415 COLL VENOUS BLD VENIPUNCTURE: CPT | Performed by: INTERNAL MEDICINE

## 2017-06-16 PROCEDURE — 85610 PROTHROMBIN TIME: CPT | Performed by: INTERNAL MEDICINE

## 2017-06-16 PROCEDURE — 00000146 ZZHCL STATISTIC GLUCOSE BY METER IP: Performed by: INTERNAL MEDICINE

## 2017-06-16 PROCEDURE — A9270 NON-COVERED ITEM OR SERVICE: HCPCS | Mod: GY | Performed by: PEDIATRICS

## 2017-06-16 PROCEDURE — A9270 NON-COVERED ITEM OR SERVICE: HCPCS | Mod: GY | Performed by: INTERNAL MEDICINE

## 2017-06-16 RX ORDER — BUMETANIDE 2 MG/1
2 TABLET ORAL DAILY
Status: ON HOLD | DISCHARGE
Start: 2017-06-16 | End: 2017-06-25

## 2017-06-16 RX ORDER — METOLAZONE 5 MG/1
5 TABLET ORAL DAILY PRN
Qty: 90 TABLET | Refills: 3 | Status: ON HOLD | DISCHARGE
Start: 2017-06-16 | End: 2017-06-25

## 2017-06-16 RX ORDER — WARFARIN SODIUM 2.5 MG/1
TABLET ORAL
Qty: 10 TABLET | Refills: 0 | Status: ON HOLD | DISCHARGE
Start: 2017-06-16 | End: 2017-07-11

## 2017-06-16 RX ADMIN — PRAVASTATIN SODIUM 40 MG: 20 TABLET ORAL at 08:28

## 2017-06-16 RX ADMIN — METOPROLOL TARTRATE 50 MG: 50 TABLET, FILM COATED ORAL at 08:29

## 2017-06-16 RX ADMIN — ISOSORBIDE MONONITRATE 30 MG: 30 TABLET, EXTENDED RELEASE ORAL at 08:29

## 2017-06-16 RX ADMIN — BUMETANIDE 2 MG: 0.5 TABLET ORAL at 08:28

## 2017-06-16 RX ADMIN — FERROUS SULFATE 325 MG: 325 TABLET, FILM COATED ORAL at 08:28

## 2017-06-16 RX ADMIN — GABAPENTIN 300 MG: 300 CAPSULE ORAL at 08:29

## 2017-06-16 NOTE — PROGRESS NOTES
Kathryn Banks  Gender: female  : 1940  [address] [id number] [phone] (home) none (work)    Medical Record: [id number]  Pharmacy:    [facility] - [place], [place] [facility] [place] [facility] [address]  [facility] [place], [place] - [address]   [facility] [place] [address]  [facility] [place], [place] - [address]  [facility] - [place], [place] - [address]  [facility] [place], [place] - [address]  Primary Care Provider: Dheeraj Jj    Parent's names are: Data Unavailable (mother) and Data Unavailable (father).      [facility]  2017     Discharge Phone Call:  Key Words/Key Times    Pt discharge to [facility]. Urbano Sharma RN

## 2017-06-16 NOTE — PROGRESS NOTES
Report called to Genoveva PONCE at [facility].   ROSARIO Peña  June 16, 2017

## 2017-06-16 NOTE — PLAN OF CARE
Problem: Goal Outcome Summary  Goal: Goal Outcome Summary  Outcome: Improving  Lungs are diminished but clear, legs feel less tight, 1+ edema to BLE. Continues with good UOP from moreira catheter. Denies pain or SOA. Vss. Pt plans to d/c to nursing facility today.

## 2017-06-16 NOTE — PLAN OF CARE
Problem: Goal Outcome Summary  Goal: Goal Outcome Summary  Outcome: Improving  Patient showered this evening.  Planning discharge tomorrow to [facility].  BGs good, no sliding scale insulin coverage required this shift.  Education provided re: CKD and how diabetes and CHF play a role with CKD.  Patient told by MD that her prognosis doesn't look great at this time and that she may need dialysis in the near future.

## 2017-06-16 NOTE — PROGRESS NOTES
"S-(situation): Patient discharged to [facility] via personal vehical with     B-(background): chronic renal failure    A-(assessment): /65 (BP Location: Left arm)  Pulse 65  Temp 98  F (36.7  C) (Oral)  Resp 18  Ht 1.626 m (5' 4\")  Wt 73 kg (161 lb)  SpO2 95%  BMI 27.64 kg/m2  Pt is alert and oriented, vitals stable. 1+ edema to bilateral lower extremities.     R-(recommendations): Discharge instructions reviewed with pt and . Listed belongings gathered and returned to patient.          Discharge Nursing Criteria:     Care Plan and Patient education resolved: Yes    New Medications- pt has been educated about purpose and side effects: Yes    Vaccines  Pneumonia Vaccine verified at discharge: Yes  Influenza status verified at discharge:  Yes      MISC  Prescriptions if needed, hard copies sent with patient  NA  Home and hospital aquired medications returned to patient: NA  Medication Bin checked and emptied on discharge Yes  Patient reports post-discharge pain management plan is effective: Yes    "

## 2017-06-16 NOTE — PROGRESS NOTES
"SPIRITUAL HEALTH SERVICES  SPIRITUAL ASSESSMENT Progress Note  [facility]      (Follow up)   provided prayer & communion to pt.  Following communion, pt mentioned \"an incredible peace had come over her.\"  No follow up is needed.    Mauricio Hernandez M.Div., [facility]  Staff   Office tel: [phone]    "

## 2017-06-16 NOTE — DISCHARGE SUMMARY
[facility]    Discharge Summary  Hospitalist    Date of Admission:  6/12/2017  Date of Discharge:  6/16/2017  Discharging Provider: Arash Silva  Date of Service (when I saw the patient): 06/16/17    Discharge Diagnoses     Acute on chronic renal failure (H)    Iron deficiency anemia    Type 2 diabetes mellitus with other circulatory complications (H)    Pulmonary hypertension (H)    Chronic heart failure (H) with preserved EF    Atrial flutter (H) - present 6/12    Memory loss    Chronic atrial fibrillation (H) on 6/12-6/13    Restless leg syndrome    CAD - NSTEMI, CABG x 5 2007, angio in 2013 with patent grafts other than occluded graft to PL    Anemia of chronic renal failure, stage 4 (severe) (H)    Non morbid obesity, unspecified obesity type    Essential hypertension with goal blood pressure less than 140/90    Blood in stool    * No resolved hospital problems. *      History of Present Illness   Kathryn Banks is an 77 year old female with complex medical problems and recent hospitalization for acute renal failure, suspected acute tubular necrosis, and severe volume overload who presented with weight gain of 12 pounds in the 6 days after she was released from the hospital.  There were concerns that she was not taking her diuretic medication at home as prescribed.  Because of concern for worsening renal failure with volume overload, she was hospitalized. See admission history and physical for details.    Hospital Course   Kathryn Banks was admitted on 6/12/2017.  The following problems were addressed during her hospitalization:    Problem #1 acute renal failure with suspected acute tubular necrosis and volume overload.  Urinalysis demonstrated moderate blood.  Random urine protein demonstrated urinary protein 1.3 g/g creatinine.  Fractional excretion of sodium was elevated at 14.3%.  Urine sodium was 115.  Creatinine progressively increased during hospital stay.  She did  not have hyperkalemia or acidosis.  She was uremic.  She was treated with continuous IV Lasix infusion and oral metolazone 5 mg daily.  She had brisk diuresis.  Weight decreased to 161 pounds at discharge which was 5 pounds lower as compared with hospital discharge about a week previously.  Clinical signs of volume overload subsided.  Guarded versus poor prognosis for recovery of renal function was discussed during her hospital stay including concern that she will require renal replacement therapy such as ultrafiltration or possibly dialysis. Close outpatient evaluation by nephrology was recommended.  Cause for worsening renal function was not identified.  At discharge, it was recommended that her weight be monitored daily.  Daily Bumex therapy was prescribed with use of metolazone as needed for weight gain or other signs of worsening volume overload.    Problem #2 anemia due to iron deficiency and severe chronic kidney disease.  Iron studies were consistent with iron deficiency.  Although she reported one episode of rectal bleeding in the early part of her hospital stay, this was not noted by nursing staff, persistent rectal bleeding was not noted, and subsequent stool testing for occult blood was negative.  She received one dose of IV Venofer during this hospital stay with advice to follow-up with her PCP to arrange additional doses if medically indicated.  Oral iron therapy was started.    Problem #3 memory problems.  She appeared to demonstrate memory problems during her hospital stay.  Cognitive evaluation was performed by occupational therapy with test results unchanged as compared with about a week previously.  She scored 22 out of 30 points on the [facility] mental status (UMS) test.  Occupational therapy did recommend more extensive cognitive testing including functional cognitive evaluation as an outpatient.    Problem #4 atrial flutter and atrial fibrillation.  In the emergency room, EKG  "tracing demonstrated atrial flutter.  Subsequent telemetry monitoring demonstrated atrial fibrillation for about 24 hours.  Her metoprolol dose was increased.  She then converted to sinus rhythm and maintained sinus rhythm for the remainder of her hospital stay.  Chronic warfarin therapy was continued.    Arash Silva MD    Significant Results and Procedures   No procedures performed during this admission    Pending Results   none      Code Status   Full Code       Primary Care Physician   Dheeraj Jj    Physical Exam   161 lbs 0 oz  /65 (BP Location: Left arm)  Pulse 65  Temp 98  F (36.7  C) (Oral)  Resp 18  Ht 1.626 m (5' 4\")  Wt 73 kg (161 lb)  SpO2 95%  BMI 27.64 kg/m2    No acute distress  Normal respiratory effort, clear lungs  Regular rate and rhythm  No significant pitting edema in the lower legs    Discharge Disposition   Discharged to home  Condition at discharge: Stable    Consultations This Hospital Stay   PHARMACY TO DOSE WARFARIN  OCCUPATIONAL THERAPY ADULT IP CONSULT  PHYSICAL THERAPY ADULT IP CONSULT  OCCUPATIONAL THERAPY ADULT IP CONSULT    Time Spent on this Encounter   I, Arash Silva, personally saw the patient today and spent greater than 30 minutes discharging this patient.    Discharge Orders     NEPHROLOGY ADULT REFERRAL     General info for SNF   Length of Stay Estimate: Short Term Care: Estimated # of Days <30  Condition at Discharge: Stable  Level of care:skilled   Rehabilitation Potential: Fair  Admission H&P remains valid and up-to-date: Yes  Recent Chemotherapy: N/A  Use Nursing Home Standing Orders: Yes     Reason for your hospital stay   Hospitalized for weight gain and swelling due to worsening kidney failure and improved     Glucose monitor nursing POCT   Daily at alternating times before meals and at bedtime     Daily weights   Call Provider for weight gain of more than 2 pounds per day or 5 pounds per week.     Activity - Up with nursing assistance "     Follow Up and recommended labs and tests   Follow up with primary care provider in 1 week.  The following labs/tests are recommended: INR on 6/19/17, basic metabolic panel and hemoglobin within 1 week.    Schedule appointment with Nephrology for [place] [facility]     Full Code     Physical Therapy Adult Consult   Evaluate and treat as clinically indicated.    Reason:  weakness     Occupational Therapy Adult Consult   Evaluate and treat as clinically indicated.  Include functional cognitive evaluation as advised by OT during this hospital stay.    Reason:  Weakness, memory loss, kidney failure with uremia     Advance Diet as Tolerated   Follow this diet upon discharge: Orders Placed This Encounter     Room Service     Combination Diet [id number] Calories: Moderate Consistent CHO (4-6 CHO units/meal); 2 gm NA Diet       Discharge Medications   Discharge Medication List as of 6/16/2017 10:00 AM      START taking these medications    Details   bumetanide (BUMEX) 2 MG tablet Take 1 tablet (2 mg) by mouth daily, Transitional      metolazone (ZAROXOLYN) 5 MG tablet Take 1 tablet (5 mg) by mouth daily as needed For weight gain more than 2 pounds in 1 day or 5 pounds in 1 week, Disp-90 tablet, R-3, Transitional      acetaminophen (TYLENOL) 325 MG tablet Take 2 tablets (650 mg) by mouth every 4 hours as needed for mild pain, Disp-100 tablet, Transitional         CONTINUE these medications which have CHANGED    Details   warfarin (COUMADIN) 2.5 MG tablet Take 1 tablet (2.5 mg) daily until INR is rechecked within 3 days, Disp-10 tablet, R-0, Transitional      isosorbide mononitrate (IMDUR) 30 MG 24 hr tablet Take 1 tablet (30 mg) by mouth daily, Transitional      nitroglycerin (NITROSTAT) 0.4 MG sublingual tablet Place 1 tablet (0.4 mg) under the tongue every 5 minutes as needed for chest pain, Disp-25 tablet, R-0, Transitional      gabapentin (NEURONTIN) 300 MG capsule Take 1 capsule (300 mg) by mouth At  Bedtime, Transitional      pravastatin (PRAVACHOL) 40 MG tablet Take 1 tablet (40 mg) by mouth daily, Disp-30 tablet, Transitional      pramipexole (MIRAPEX) 0.5 MG tablet Take 3 tablets (1.5 mg) by mouth every evening, Transitional      metoprolol (LOPRESSOR) 25 MG tablet Take 2 tablets (50 mg) by mouth 2 times daily, Disp-60 tablet, R-0, Transitional      ferrous sulfate (IRON) 325 (65 FE) MG tablet Take 1 tablet (325 mg) by mouth daily (with breakfast), Disp-100 tablet, Transitional      calcium carbonate-vitamin D 500-400 MG-UNIT TABS per tablet Take 1 tablet by mouth daily, Transitional         CONTINUE these medications which have NOT CHANGED    Details   cyanocobalamin (VITAMIN B12) 1000 MCG/ML injection Inject 1 mL (1,000 mcg) into the muscle every 30 days, Disp-1 mL, R-11, Injection      ORDER FOR DME Equipment being ordered: cpap machine, mask, humidifier, tubing and filtersDisp-1 Device, R-0, Normal         STOP taking these medications       GLIPIZIDE XL PO Comments:   Reason for Stopping:         furosemide (LASIX) 40 MG tablet Comments:   Reason for Stopping:             Allergies   Allergies   Allergen Reactions     Ciprofloxacin Nausea and Vomiting     Zofran did not help     Oxycodone Visual Disturbance     Delusions, blackouts      Lisinopril Cough     Penicillins Rash     Sulfa Drugs GI Disturbance     LOSS OF TASTE     Data   Most Recent 3 CBC's:  Recent Labs   Lab Test  06/16/17   0615  06/15/17   0527  06/14/17   1750  06/14/17   0551  06/13/17   0516  06/12/17   1250   WBC   --    --    --   5.9  6.4  8.3   HGB  9.0*  7.9*  9.0*  7.6*  8.0*  8.7*   MCV   --    --    --   80  81  81   PLT   --    --    --   232  237  253      Most Recent 3 BMP's:  Recent Labs   Lab Test  06/16/17   0615  06/15/17   0527  06/14/17   0551   NA  143  142  143   POTASSIUM  4.4  4.3  4.1   CHLORIDE  107  105  107   CO2  26  24  26   BUN  94*  89*  74*   CR  4.03*  3.73*  3.36*   ANIONGAP  10  13  10   MALICK  7.3*   7.3*  7.6*   GLC  124*  150*  97     Most Recent 2 LFT's:  Recent Labs   Lab Test  06/12/17   1250  05/31/17   1541   AST  42  21   ALT  47  33   ALKPHOS  177*  216*   BILITOTAL  0.3  0.3     Most Recent INR's and Anticoagulation Dosing History:  Anticoagulation Dose History     Recent Dosing and Labs Latest Ref Rng & Units 6/7/2017 6/9/2017 6/12/2017 6/13/2017 6/14/2017 6/15/2017 6/16/2017    Warfarin 2.5 mg - - - - - - 2.5 mg -    Warfarin 5 mg - - - 5 mg 5 mg - - -    INR 0.86 - 1.14 - 3.1 2.44(H) 2.62(H) 3.29(H) 3.82(H) 3.43(H)    INR 0.86 - 1.14 - - - - - - -    INR Point of Care 0.86 - 1.14 3.4(H) - - - - - -        Most Recent 3 Troponin's:  Recent Labs   Lab Test  05/31/17   1541  04/28/17   1050  07/02/16   0125   02/22/14   0024   TROPI  <0.015  The 99th percentile for upper reference range is 0.045 ug/L.  Troponin values in   the range of 0.045 - 0.120 ug/L may be associated with risks of adverse   clinical events.    0.026  0.019   < >   --    TROPONIN   --    --    --    --   0.01    < > = values in this interval not displayed.     Most Recent Cholesterol Panel:  Recent Labs   Lab Test  04/12/16   1132   CHOL  127   LDL  47   HDL  65   TRIG  73     Most Recent 6 Bacteria Isolates From Any Culture (See EPIC Reports for Culture Details):  Recent Labs   Lab Test  06/12/17   2045  06/01/17   2045  05/31/17   1915  04/28/17   1747  04/28/17   1624  04/28/17   1621   CULT  No MRSA isolated  No growth  No MRSA isolated  No MRSA isolated  No growth after 6 days  No growth after 6 days     Most Recent TSH, T4 and A1c Labs:  Recent Labs   Lab Test  04/28/17   1050  03/31/17   1510   TSH  0.75   --    A1C   --   6.2*     Results for orders placed or performed during the hospital encounter of 06/12/17   XR Chest Port 1 View    Narrative    PORTABLE CHEST ONE VIEW   6/12/2017 4:38 PM     HISTORY: Bilateral lower extremity edema.    COMPARISON: Chest x-rays dated 5/31/2017.    FINDINGS: Cardiac silhouette remains  enlarged even given technique.  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 Lungs are  otherwise grossly clear. No pneumothorax or significant pleural fluid  collection. No fracture.      Impression    IMPRESSION: Cardiomegaly is again noted. No other evidence of acute  cardiopulmonary disease is seen.    I discussed these findings with Dr. Morales on 6/12/2017 at  approximately 4:56 PM.    MOSHE LEYVA MD     *Note: Due to a large number of results and/or encounters for the requested time period, some results have not been displayed. A complete set of results can be found in Results Review.

## 2017-06-16 NOTE — PLAN OF CARE
PAS-RR    D: Per DHS regulation, SW completed and submitted PAS-RR to [place] [employer] Direct Connect via the Senior [employer] Line.  PAS-RR confirmation # is : [id number]    I: SW spoke with Patient and  and they are aware a PAS-RR has been submitted.  SW reviewed with patient and  that they may be contacted for a follow up appointment within 10 days of hospital discharge if their SNF stay is < 30 days.  Contact information for [employer] was also provided.    A: Patient and  verbalized understanding.    P: Further questions may be directed to Senior [employer] Line at #[phone], option #4 for PAS-RR staff.

## 2017-06-17 ENCOUNTER — TELEPHONE (OUTPATIENT)
Dept: GERIATRICS | Facility: CLINIC | Age: 77
End: 2017-06-17

## 2017-06-17 NOTE — TELEPHONE ENCOUNTER
INR today is 3.7   Coumadin dose is 2.5mg QD  INR has been running 3.5-3.7   Order. Give coumadin 2.5mg QD and recheck INR on monday

## 2017-06-19 ENCOUNTER — CARE COORDINATION (OUTPATIENT)
Dept: CARE COORDINATION | Facility: CLINIC | Age: 77
End: 2017-06-19

## 2017-06-19 ENCOUNTER — TRANSFERRED RECORDS (OUTPATIENT)
Dept: HEALTH INFORMATION MANAGEMENT | Facility: CLINIC | Age: 77
End: 2017-06-19

## 2017-06-19 ENCOUNTER — TELEPHONE (OUTPATIENT)
Dept: NEPHROLOGY | Facility: CLINIC | Age: 77
End: 2017-06-19

## 2017-06-19 ENCOUNTER — NURSING HOME VISIT (OUTPATIENT)
Dept: GERIATRICS | Facility: CLINIC | Age: 77
End: 2017-06-19
Payer: MEDICARE

## 2017-06-19 DIAGNOSIS — R35.0 URINARY FREQUENCY: ICD-10-CM

## 2017-06-19 DIAGNOSIS — E11.59 TYPE 2 DIABETES MELLITUS WITH OTHER CIRCULATORY COMPLICATIONS (H): ICD-10-CM

## 2017-06-19 DIAGNOSIS — I48.20 CHRONIC ATRIAL FIBRILLATION (H): ICD-10-CM

## 2017-06-19 DIAGNOSIS — I50.32 CHRONIC DIASTOLIC HEART FAILURE (H): ICD-10-CM

## 2017-06-19 DIAGNOSIS — R53.81 PHYSICAL DECONDITIONING: Primary | ICD-10-CM

## 2017-06-19 DIAGNOSIS — N17.0 ATN (ACUTE TUBULAR NECROSIS) (H): Primary | ICD-10-CM

## 2017-06-19 DIAGNOSIS — N18.30 CHRONIC KIDNEY DISEASE, STAGE III (MODERATE) (H): ICD-10-CM

## 2017-06-19 DIAGNOSIS — I10 ESSENTIAL HYPERTENSION WITH GOAL BLOOD PRESSURE LESS THAN 140/90: ICD-10-CM

## 2017-06-19 PROCEDURE — 99309 SBSQ NF CARE MODERATE MDM 30: CPT | Mod: GW | Performed by: NURSE PRACTITIONER

## 2017-06-19 NOTE — PROGRESS NOTES
Clinic Care Coordination Contact  [facility]/Voicemail    Referral Source: CTS  Clinical Data: Care Coordinator Outreach  Outreach attempted x 1.  Patient's  reported Patient is in TCU at [facility].   Plan: Care Coordinator will follow up on discharge.    Call to [facility]:  Left message with contact numbers.   Christian De Jesus RN  Clinic Care Coordinator  [phone] or [phone]

## 2017-06-19 NOTE — TELEPHONE ENCOUNTER
[facility] Call Center    Phone Message    Name of Caller: MARI HERNANDEZ     Phone Number: [phone]    Best time to return call:     May a detailed message be left on voicemail: no    Relation to patient: Self    Reason for Call: Other: Pt is in NH because of chronic disease. Dr Barfield sees  [place]. She is asking for an earlier appt. Is on waitlist.      Action Taken: Message routed to:  Adult Clinics: Nephrology p [phone]

## 2017-06-19 NOTE — PROGRESS NOTES
"[facility]  PRIMARY CARE PROVIDER AND CLINIC:  Dheeraj Jj [facility] [address]  / JEFFERY KRAMER 5*  Chief Complaint   Patient presents with     Hospital F/U       HPI:    Kathryn Banks is a 77 year old  (1940),admitted to the [facility]  from [facility].  Hospital stay 6/12/17 through 6/16/17.  Admitted to this facility for  rehab, medical management and nursing care.  Current issues are:         Physical deconditioning  Chronic atrial fibrillation (H)  Type 2 diabetes mellitus with other circulatory complications (H)  Essential hypertension with goal blood pressure less than 140/90  Chronic kidney disease, stage III (moderate)  Chronic diastolic heart failure (H)  Urinary frequency     See assessment / plan for HPI      Hospital Course:   \"Kathryn Banks was admitted on 6/12/2017.  The following problems were addressed during her hospitalization:     Problem #1 acute renal failure with suspected acute tubular necrosis and volume overload.  Urinalysis demonstrated moderate blood.  Random urine protein demonstrated urinary protein 1.3 g/g creatinine.  Fractional excretion of sodium was elevated at 14.3%.  Urine sodium was 115.  Creatinine progressively increased during hospital stay.  She did not have hyperkalemia or acidosis.  She was uremic.  She was treated with continuous IV Lasix infusion and oral metolazone 5 mg daily.  She had brisk diuresis.  Weight decreased to 161 pounds at discharge which was 5 pounds lower as compared with hospital discharge about a week previously.  Clinical signs of volume overload subsided.  Guarded versus poor prognosis for recovery of renal function was discussed during her hospital stay including concern that she will require renal replacement therapy such as ultrafiltration or possibly dialysis. Close outpatient evaluation by nephrology was recommended.  Cause for worsening renal " "function was not identified.  At discharge, it was recommended that her weight be monitored daily.  Daily Bumex therapy was prescribed with use of metolazone as needed for weight gain or other signs of worsening volume overload.     Problem #2 anemia due to iron deficiency and severe chronic kidney disease.  Iron studies were consistent with iron deficiency.  Although she reported one episode of rectal bleeding in the early part of her hospital stay, this was not noted by nursing staff, persistent rectal bleeding was not noted, and subsequent stool testing for occult blood was negative.  She received one dose of IV Venofer during this hospital stay with advice to follow-up with her PCP to arrange additional doses if medically indicated.  Oral iron therapy was started.     Problem #3 memory problems.  She appeared to demonstrate memory problems during her hospital stay.  Cognitive evaluation was performed by occupational therapy with test results unchanged as compared with about a week previously.  She scored 22 out of 30 points on the [facility] mental status (UMS) test.  Occupational therapy did recommend more extensive cognitive testing including functional cognitive evaluation as an outpatient.     Problem #4 atrial flutter and atrial fibrillation.  In the emergency room, EKG tracing demonstrated atrial flutter.  Subsequent telemetry monitoring demonstrated atrial fibrillation for about 24 hours.  Her metoprolol dose was increased.  She then converted to sinus rhythm and maintained sinus rhythm for the remainder of her hospital stay.  Chronic warfarin therapy was continued.\"    CODE STATUS/ADVANCE DIRECTIVES DISCUSSION:   CPR/Full code   Patient's living condition: lives with spouse    ALLERGIES:Ciprofloxacin; Oxycodone; Lisinopril; Penicillins; and Sulfa drugs  PAST MEDICAL HISTORY:  has a past medical history of Carpal tunnel syndrome; Coronary atherosclerosis of native coronary artery (2006); " OBSTRUCTIVE SLEEP APNEA; Osteoarthrosis, unspecified whether generalized or localized, unspecified site; Other and combined forms of senile cataract (2000); Other and unspecified hyperlipidemia; RESTLESS LEGS SYNDROME; TENOSYNOV HAND/WRIST NEC (6/30/2006); Type II or unspecified type diabetes mellitus without mention of complication, not stated as uncontrolled (2001); Typical atrial flutter (H) (01/17/2007); and Unspecified essential hypertension.  PAST SURGICAL HISTORY:  has a past surgical history that includes TOTAL ABDOM HYSTERECTOMY (1995); REMOVAL OF OVARY/TUBE(S); APPENDECTOMY; REVISE MEDIAN N/CARPAL TUNNEL SURG; SOTO W/O FACETEC FORAMOT/DSKC 1/2 VRT SEG, LUMBAR (1968); VAGOTOMY/PYLOROPLASTY,SELECT (1970); REMV CATARACT INTRACAP,INSERT LENS; COLONOSCOPY THRU STOMA, DIAGNOSTIC (10/7/04); endoscopy (03/21/2000); Colonoscopy w/wo [place] **Performed** (10/31/07); UGI ENDOSCOPY DIAG W BIOPSY (10/31/07); colonoscopy (12/3/2007); UPPER GI ENDOSCOPY,EXAM (12/3/2007); cystoscopy (11/25/09); angioplasty; CABG, VEIN, FIVE (12/06); Colonoscopy (1/17/2014); and Open reduction internal fixation hip nailing (Left, 7/3/2016).  FAMILY HISTORY: family history includes Blood Disease in her father; DIABETES in her father, maternal grandmother, and paternal grandmother; Neurologic Disorder in her father. There is no history of Hypertension or CEREBROVASCULAR DISEASE.  SOCIAL HISTORY:  reports that she quit smoking about 48 years ago. She quit after 10.00 years of use. She has never used smokeless tobacco. She reports that she drinks alcohol. She reports that she does not use illicit drugs.    Post Discharge Medication Reconciliation Status: discharge medications reconciled, continue medications without change.  No current outpatient prescriptions on file.       ROS:  10 point ROS of systems including Constitutional, Eyes, Respiratory, Cardiovascular, Gastroenterology, Genitourinary, Integumentary, Muscularskeletal, Psychiatric were  all negative except for pertinent positives noted in my HPI.    Exam:  /70  Pulse 75  Temp 97.8  F (36.6  C)  Resp 16  Wt 162 lb 6.4 oz (73.7 kg)  BMI 27.88 kg/m2  GENERAL APPEARANCE:  Alert, in no distress  RESP:  respiratory effort and palpation of chest normal, no respiratory distress, lungs sounds clear  CV:  Palpation and auscultation of heart done , regular rate and rhythm, no murmur, rub, or gallop, edema +2 bilateral LE (states baseline)  ABDOMEN:  normal bowel sounds, soft, nontender, no hepatosplenomegaly or other masses  M/S:  Gait and station obs in w/c, no tenderness or swelling of the joints   SKIN:  Inspection and palpation of skin and subcutaneous tissue at baseline  PSYCH:  insight and judgement, memory seems intact, affect and mood normal     BP 6/16-6/17: 107-118/59-67 mmHg    Lab/Diagnostic data:  CBC RESULTS:   Recent Labs   Lab Test  06/16/17   0615  06/15/17   0527   06/14/17   0551  06/13/17   0516   WBC   --    --    --   5.9  6.4   RBC   --    --    --   3.28*  3.42*   HGB  9.0*  7.9*   < >  7.6*  8.0*   HCT   --    --    --   26.2*  27.6*   MCV   --    --    --   80  81   MCH   --    --    --   23.2*  23.4*   MCHC   --    --    --   29.0*  29.0*   RDW   --    --    --   16.6*  16.6*   PLT   --    --    --   232  237    < > = values in this interval not displayed.       Last Basic Metabolic Panel:  Recent Labs   Lab Test  06/16/17   0615  06/15/17   0527   NA  143  142   POTASSIUM  4.4  4.3   CHLORIDE  107  105   MALICK  7.3*  7.3*   CO2  26  24   BUN  94*  89*   CR  4.03*  3.73*   GLC  124*  150*       Liver Function Studies -   Recent Labs   Lab Test  06/12/17   1250  05/31/17   1541   PROTTOTAL  6.1*  5.6*   ALBUMIN  3.1*  2.5*   BILITOTAL  0.3  0.3   ALKPHOS  177*  216*   AST  42  21   ALT  47  33       TSH   Date Value Ref Range Status   04/28/2017 0.75 0.40 - 4.00 mU/L Final   01/10/2017 1.03 0.40 - 4.00 mU/L Final     Lab Results   Component Value Date    A1C 6.2 03/31/2017     A1C 6.6 07/03/2016       ASSESSMENT/PLAN:  Physical deconditioning  Sent to TCU following hospital stay for Physical Therapy / Ocupational Therapy and monitoring.   - continue therapy    Chronic atrial fibrillation (H)  Rate controlled with metoprolol    Type 2 diabetes mellitus with other circulatory complications (H)  Was on glipizide prior to admit and blood sugars were well controlled. Admitted to nursing home without any meds. Blood sugar checks 137-291. Today we discussed starting insulin and she states she would be ok with this. Would like to get a few more blood sugars as I only have a few readings.   - plan is to start long acting insulin    Essential hypertension with goal blood pressure less than 140/90  Good control of b/ps with imdur, metoprolol, and bumex.    Chronic kidney disease, stage III (moderate)  Acute kidney injury noted during hospital stay with discharing creat at 3.73  - follow up with nephrology  - continue current dosages of medicaitons  - avoid nephrotoxic medicaitons  - recheck bmp    Chronic diastolic heart failure (H)  Recent hospital stay with increase in weight and leg edema. Currently on BB, imdur, and bumex. Has an order for PRN metolazone for weight gain - has not needed.  - continue without change  - contiue daily weights    Urinary frequnecy  Complains of frequency and cloudy urine. Recently treated for UTI with cefdinir  Ok to check urine.      Orders:  1 UA/UC dx frequency      Electronically signed by:  Hortensia Alcala NP

## 2017-06-20 NOTE — TELEPHONE ENCOUNTER
[facility] Call Center    Phone Message    Name of Caller: Kathryn    Phone Number: Other phone number:  [phone]    Best time to return call: ANy    May a detailed message be left on voicemail: yes    Relation to patient: Self    Reason for Call: Other: Patient is following up from her request as of yesterday. Patient would like to be called back to see if she can get in sooner than what she is scheduled with Dr. Barfield. Patient is currently in a nursing home. Please advise    Action Taken: Message routed to:  Adult Clinics: Nephrology p [phone]

## 2017-06-20 NOTE — TELEPHONE ENCOUNTER
Left message with patient's  that Dr. Barfield would offer patient a 9:15 nephrology consult tomorrow, 6/21. She would like for her to have labs at 845. Labs include, renal panel, UA, urine protein, urine microalbumin, pth, tibc, ferritin.     Sharon called back. She will take the appointment.    Arlet Webb RN, BSN  Nephrology Care Coordinator  [facility]

## 2017-06-21 ENCOUNTER — OFFICE VISIT (OUTPATIENT)
Dept: NEPHROLOGY | Facility: CLINIC | Age: 77
End: 2017-06-21
Payer: COMMERCIAL

## 2017-06-21 ENCOUNTER — HOSPITAL ENCOUNTER (INPATIENT)
Facility: CLINIC | Age: 77
LOS: 3 days | Discharge: HOME | DRG: 683 | End: 2017-06-25
Attending: INTERNAL MEDICINE | Admitting: ORTHOPAEDIC SURGERY
Payer: MEDICARE

## 2017-06-21 VITALS
RESPIRATION RATE: 16 BRPM | TEMPERATURE: 97.8 F | DIASTOLIC BLOOD PRESSURE: 70 MMHG | BODY MASS INDEX: 27.88 KG/M2 | SYSTOLIC BLOOD PRESSURE: 123 MMHG | HEART RATE: 75 BPM | WEIGHT: 162.4 LBS

## 2017-06-21 VITALS
TEMPERATURE: 98.3 F | HEART RATE: 73 BPM | HEIGHT: 64 IN | BODY MASS INDEX: 28.44 KG/M2 | WEIGHT: 166.6 LBS | SYSTOLIC BLOOD PRESSURE: 123 MMHG | DIASTOLIC BLOOD PRESSURE: 71 MMHG

## 2017-06-21 DIAGNOSIS — N18.4 STAGE 4 CHRONIC KIDNEY DISEASE (H): Primary | ICD-10-CM

## 2017-06-21 DIAGNOSIS — N18.30 CHRONIC KIDNEY DISEASE, STAGE III (MODERATE) (H): ICD-10-CM

## 2017-06-21 DIAGNOSIS — N30.00 ACUTE CYSTITIS WITHOUT HEMATURIA: ICD-10-CM

## 2017-06-21 DIAGNOSIS — R82.90 NONSPECIFIC FINDING ON EXAMINATION OF URINE: Primary | ICD-10-CM

## 2017-06-21 DIAGNOSIS — N19 RENAL FAILURE: Primary | ICD-10-CM

## 2017-06-21 DIAGNOSIS — N17.0 ATN (ACUTE TUBULAR NECROSIS) (H): ICD-10-CM

## 2017-06-21 DIAGNOSIS — R31.29 MICROSCOPIC HEMATURIA: ICD-10-CM

## 2017-06-21 DIAGNOSIS — I10 ESSENTIAL HYPERTENSION WITH GOAL BLOOD PRESSURE LESS THAN 140/90: ICD-10-CM

## 2017-06-21 DIAGNOSIS — R60.9 EDEMA, UNSPECIFIED TYPE: ICD-10-CM

## 2017-06-21 DIAGNOSIS — R53.81 PHYSICAL DECONDITIONING: ICD-10-CM

## 2017-06-21 DIAGNOSIS — N17.0 ACUTE RENAL FAILURE WITH TUBULAR NECROSIS (H): ICD-10-CM

## 2017-06-21 PROBLEM — N17.9 ACUTE KIDNEY INJURY (NONTRAUMATIC) (H): Status: ACTIVE | Noted: 2017-06-21

## 2017-06-21 LAB
ALBUMIN SERPL-MCNC: 2.9 G/DL (ref 3.4–5)
ALBUMIN UR-MCNC: 10 MG/DL
ANION GAP SERPL CALCULATED.3IONS-SCNC: 15 MMOL/L (ref 3–14)
APPEARANCE UR: ABNORMAL
BACTERIA #/AREA URNS HPF: ABNORMAL /HPF
BILIRUB UR QL STRIP: NEGATIVE
BUN SERPL-MCNC: 107 MG/DL (ref 7–30)
C DIFF TOX B STL QL: NORMAL
CALCIUM SERPL-MCNC: 7.6 MG/DL (ref 8.5–10.1)
CHLORIDE SERPL-SCNC: 113 MMOL/L (ref 94–109)
CO2 SERPL-SCNC: 18 MMOL/L (ref 20–32)
COLOR UR AUTO: ABNORMAL
CREAT SERPL-MCNC: 4.17 MG/DL (ref 0.52–1.04)
CREAT SERPL-MCNC: 4.26 MG/DL (ref 0.52–1.04)
CREAT UR-MCNC: 28 MG/DL
CREAT UR-MCNC: 29 MG/DL
ERYTHROCYTE [DISTWIDTH] IN BLOOD BY AUTOMATED COUNT: 16.8 % (ref 10–15)
FERRITIN SERPL-MCNC: 196 NG/ML (ref 8–252)
FRACT EXCRET NA UR+SERPL-RTO: 8.9 %
GBM IGG SER IA-ACNC: NORMAL AI (ref 0–0.9)
GFR SERPL CREATININE-BSD FRML MDRD: 10 ML/MIN/1.7M2
GLUCOSE BLDC GLUCOMTR-MCNC: 141 MG/DL (ref 70–99)
GLUCOSE BLDC GLUCOMTR-MCNC: 215 MG/DL (ref 70–99)
GLUCOSE BLDC GLUCOMTR-MCNC: 251 MG/DL (ref 70–99)
GLUCOSE SERPL-MCNC: 140 MG/DL (ref 70–99)
GLUCOSE UR STRIP-MCNC: NEGATIVE MG/DL
HCT VFR BLD AUTO: 29.7 % (ref 35–47)
HGB BLD-MCNC: 9 G/DL (ref 11.7–15.7)
HGB UR QL STRIP: ABNORMAL
INR PPP: 2.4 (ref 0.86–1.14)
IRON SATN MFR SERPL: 17 % (ref 15–46)
IRON SERPL-MCNC: 36 UG/DL (ref 35–180)
KETONES UR STRIP-MCNC: NEGATIVE MG/DL
LEUKOCYTE ESTERASE UR QL STRIP: ABNORMAL
MCH RBC QN AUTO: 23.5 PG (ref 26.5–33)
MCHC RBC AUTO-ENTMCNC: 30.3 G/DL (ref 31.5–36.5)
MCV RBC AUTO: 78 FL (ref 78–100)
MICROALBUMIN UR-MCNC: 25 MG/L
MICROALBUMIN/CREAT UR: 88.81 MG/G CR (ref 0–25)
NITRATE UR QL: NEGATIVE
NON-SQ EPI CELLS #/AREA URNS LPF: ABNORMAL /LPF
PH UR STRIP: 5 PH (ref 5–7)
PHOSPHATE SERPL-MCNC: 6 MG/DL (ref 2.5–4.5)
PLATELET # BLD AUTO: 254 10E9/L (ref 150–450)
POTASSIUM SERPL-SCNC: 4.4 MMOL/L (ref 3.4–5.3)
PROT UR-MCNC: 0.25 G/L
PROT/CREAT 24H UR: 0.89 G/G CR (ref 0–0.2)
PTH-INTACT SERPL-MCNC: 567 PG/ML (ref 12–72)
RBC # BLD AUTO: 3.83 10E12/L (ref 3.8–5.2)
RBC #/AREA URNS AUTO: ABNORMAL /HPF (ref 0–2)
SODIUM SERPL-SCNC: 145 MMOL/L (ref 133–144)
SODIUM SERPL-SCNC: 146 MMOL/L (ref 133–144)
SODIUM UR-SCNC: 86 MMOL/L
SP GR UR STRIP: 1.01 (ref 1–1.03)
SPECIMEN SOURCE: NORMAL
TIBC SERPL-MCNC: 209 UG/DL (ref 240–430)
URN SPEC COLLECT METH UR: ABNORMAL
UROBILINOGEN UR STRIP-MCNC: NORMAL MG/DL (ref 0–2)
UUN UR-MCNC: 346 MG/DL
UUN/CREAT 24H UR: 12 G/G CR
WBC # BLD AUTO: 6 10E9/L (ref 4–11)
WBC #/AREA URNS AUTO: ABNORMAL /HPF (ref 0–2)

## 2017-06-21 PROCEDURE — 85610 PROTHROMBIN TIME: CPT | Performed by: STUDENT IN AN ORGANIZED HEALTH CARE EDUCATION/TRAINING PROGRAM

## 2017-06-21 PROCEDURE — 82728 ASSAY OF FERRITIN: CPT | Performed by: INTERNAL MEDICINE

## 2017-06-21 PROCEDURE — 36415 COLL VENOUS BLD VENIPUNCTURE: CPT | Performed by: STUDENT IN AN ORGANIZED HEALTH CARE EDUCATION/TRAINING PROGRAM

## 2017-06-21 PROCEDURE — 99205 OFFICE O/P NEW HI 60 MIN: CPT | Mod: GC | Performed by: INTERNAL MEDICINE

## 2017-06-21 PROCEDURE — 84295 ASSAY OF SERUM SODIUM: CPT | Performed by: INTERNAL MEDICINE

## 2017-06-21 PROCEDURE — 80069 RENAL FUNCTION PANEL: CPT | Performed by: INTERNAL MEDICINE

## 2017-06-21 PROCEDURE — 83970 ASSAY OF PARATHORMONE: CPT | Performed by: INTERNAL MEDICINE

## 2017-06-21 PROCEDURE — A9270 NON-COVERED ITEM OR SERVICE: HCPCS | Mod: GY | Performed by: STUDENT IN AN ORGANIZED HEALTH CARE EDUCATION/TRAINING PROGRAM

## 2017-06-21 PROCEDURE — 82043 UR ALBUMIN QUANTITATIVE: CPT | Performed by: INTERNAL MEDICINE

## 2017-06-21 PROCEDURE — 25000132 ZZH RX MED GY IP 250 OP 250 PS 637: Mod: GY | Performed by: STUDENT IN AN ORGANIZED HEALTH CARE EDUCATION/TRAINING PROGRAM

## 2017-06-21 PROCEDURE — 85027 COMPLETE CBC AUTOMATED: CPT | Performed by: STUDENT IN AN ORGANIZED HEALTH CARE EDUCATION/TRAINING PROGRAM

## 2017-06-21 PROCEDURE — A9270 NON-COVERED ITEM OR SERVICE: HCPCS | Mod: GY | Performed by: INTERNAL MEDICINE

## 2017-06-21 PROCEDURE — 81001 URINALYSIS AUTO W/SCOPE: CPT | Performed by: INTERNAL MEDICINE

## 2017-06-21 PROCEDURE — 83550 IRON BINDING TEST: CPT | Performed by: INTERNAL MEDICINE

## 2017-06-21 PROCEDURE — 36415 COLL VENOUS BLD VENIPUNCTURE: CPT | Performed by: INTERNAL MEDICINE

## 2017-06-21 PROCEDURE — 84540 ASSAY OF URINE/UREA-N: CPT | Performed by: STUDENT IN AN ORGANIZED HEALTH CARE EDUCATION/TRAINING PROGRAM

## 2017-06-21 PROCEDURE — 96372 THER/PROPH/DIAG INJ SC/IM: CPT

## 2017-06-21 PROCEDURE — 84300 ASSAY OF URINE SODIUM: CPT | Performed by: STUDENT IN AN ORGANIZED HEALTH CARE EDUCATION/TRAINING PROGRAM

## 2017-06-21 PROCEDURE — 93010 ELECTROCARDIOGRAM REPORT: CPT | Performed by: INTERNAL MEDICINE

## 2017-06-21 PROCEDURE — 83516 IMMUNOASSAY NONANTIBODY: CPT | Performed by: INTERNAL MEDICINE

## 2017-06-21 PROCEDURE — 99223 1ST HOSP IP/OBS HIGH 75: CPT | Mod: AI | Performed by: INTERNAL MEDICINE

## 2017-06-21 PROCEDURE — 86255 FLUORESCENT ANTIBODY SCREEN: CPT | Mod: 90 | Performed by: INTERNAL MEDICINE

## 2017-06-21 PROCEDURE — 25000131 ZZH RX MED GY IP 250 OP 636 PS 637: Mod: GY | Performed by: STUDENT IN AN ORGANIZED HEALTH CARE EDUCATION/TRAINING PROGRAM

## 2017-06-21 PROCEDURE — 84156 ASSAY OF PROTEIN URINE: CPT | Performed by: INTERNAL MEDICINE

## 2017-06-21 PROCEDURE — 93005 ELECTROCARDIOGRAM TRACING: CPT

## 2017-06-21 PROCEDURE — 87493 C DIFF AMPLIFIED PROBE: CPT | Performed by: STUDENT IN AN ORGANIZED HEALTH CARE EDUCATION/TRAINING PROGRAM

## 2017-06-21 PROCEDURE — 87086 URINE CULTURE/COLONY COUNT: CPT | Performed by: INTERNAL MEDICINE

## 2017-06-21 PROCEDURE — 25000132 ZZH RX MED GY IP 250 OP 250 PS 637: Mod: GY | Performed by: INTERNAL MEDICINE

## 2017-06-21 PROCEDURE — 00000146 ZZHCL STATISTIC GLUCOSE BY METER IP

## 2017-06-21 PROCEDURE — 83540 ASSAY OF IRON: CPT | Performed by: INTERNAL MEDICINE

## 2017-06-21 PROCEDURE — 99000 SPECIMEN HANDLING OFFICE-LAB: CPT | Performed by: INTERNAL MEDICINE

## 2017-06-21 PROCEDURE — G0378 HOSPITAL OBSERVATION PER HR: HCPCS

## 2017-06-21 RX ORDER — AMOXICILLIN 250 MG
1-2 CAPSULE ORAL 2 TIMES DAILY PRN
Status: DISCONTINUED | OUTPATIENT
Start: 2017-06-21 | End: 2017-06-25 | Stop reason: HOSPADM

## 2017-06-21 RX ORDER — ACETAMINOPHEN 325 MG/1
650 TABLET ORAL EVERY 4 HOURS PRN
Status: DISCONTINUED | OUTPATIENT
Start: 2017-06-21 | End: 2017-06-25 | Stop reason: HOSPADM

## 2017-06-21 RX ORDER — ONDANSETRON 2 MG/ML
4 INJECTION INTRAMUSCULAR; INTRAVENOUS EVERY 6 HOURS PRN
Status: DISCONTINUED | OUTPATIENT
Start: 2017-06-21 | End: 2017-06-25 | Stop reason: HOSPADM

## 2017-06-21 RX ORDER — NICOTINE POLACRILEX 4 MG
15-30 LOZENGE BUCCAL
Status: DISCONTINUED | OUTPATIENT
Start: 2017-06-21 | End: 2017-06-25 | Stop reason: HOSPADM

## 2017-06-21 RX ORDER — ONDANSETRON 4 MG/1
4 TABLET, ORALLY DISINTEGRATING ORAL EVERY 6 HOURS PRN
Status: DISCONTINUED | OUTPATIENT
Start: 2017-06-21 | End: 2017-06-25 | Stop reason: HOSPADM

## 2017-06-21 RX ORDER — DEXTROSE MONOHYDRATE 25 G/50ML
25-50 INJECTION, SOLUTION INTRAVENOUS
Status: DISCONTINUED | OUTPATIENT
Start: 2017-06-21 | End: 2017-06-25 | Stop reason: HOSPADM

## 2017-06-21 RX ORDER — PRAVASTATIN SODIUM 40 MG
40 TABLET ORAL DAILY
Status: DISCONTINUED | OUTPATIENT
Start: 2017-06-21 | End: 2017-06-25 | Stop reason: HOSPADM

## 2017-06-21 RX ORDER — LIDOCAINE 40 MG/G
CREAM TOPICAL
Status: DISCONTINUED | OUTPATIENT
Start: 2017-06-21 | End: 2017-06-25 | Stop reason: HOSPADM

## 2017-06-21 RX ORDER — NALOXONE HYDROCHLORIDE 0.4 MG/ML
.1-.4 INJECTION, SOLUTION INTRAMUSCULAR; INTRAVENOUS; SUBCUTANEOUS
Status: DISCONTINUED | OUTPATIENT
Start: 2017-06-21 | End: 2017-06-25 | Stop reason: HOSPADM

## 2017-06-21 RX ORDER — WARFARIN SODIUM 5 MG/1
5 TABLET ORAL
Status: COMPLETED | OUTPATIENT
Start: 2017-06-21 | End: 2017-06-21

## 2017-06-21 RX ORDER — ISOSORBIDE MONONITRATE 30 MG/1
30 TABLET, EXTENDED RELEASE ORAL DAILY
Status: DISCONTINUED | OUTPATIENT
Start: 2017-06-21 | End: 2017-06-25 | Stop reason: HOSPADM

## 2017-06-21 RX ORDER — METOPROLOL TARTRATE 50 MG
50 TABLET ORAL 2 TIMES DAILY
Status: DISCONTINUED | OUTPATIENT
Start: 2017-06-21 | End: 2017-06-25 | Stop reason: HOSPADM

## 2017-06-21 RX ADMIN — ISOSORBIDE MONONITRATE 30 MG: 30 TABLET, EXTENDED RELEASE ORAL at 17:46

## 2017-06-21 RX ADMIN — WARFARIN SODIUM 5 MG: 5 TABLET ORAL at 19:50

## 2017-06-21 RX ADMIN — INSULIN ASPART 1 UNITS: 100 INJECTION, SOLUTION INTRAVENOUS; SUBCUTANEOUS at 19:51

## 2017-06-21 RX ADMIN — PRAVASTATIN SODIUM 40 MG: 40 TABLET ORAL at 17:46

## 2017-06-21 RX ADMIN — METOPROLOL TARTRATE 50 MG: 50 TABLET, FILM COATED ORAL at 19:50

## 2017-06-21 NOTE — IP AVS SNAPSHOT
[address] [address]    Phone:  [phone]                                       After Visit Summary   6/21/2017    Kathryn Banks    MRN: [id number]           After Visit Summary Signature Page     I have received my discharge instructions, and my questions have been answered. I have discussed any challenges I see with this plan with the nurse or doctor.    ..........................................................................................................................................  Patient/Patient Representative Signature      ..........................................................................................................................................  Patient Representative Print Name and Relationship to Patient    ..................................................               ................................................  Date                                            Time    ..........................................................................................................................................  Reviewed by Signature/Title    ...................................................              ..............................................  Date                                                            Time

## 2017-06-21 NOTE — MR AVS SNAPSHOT
After Visit Summary   6/21/2017    Kathryn Banks    MRN: [id number]           Patient Information     Date Of Birth          1940        Visit Information        Provider Department      6/21/2017 9:15 AM Vibha Barfield MD [facility]        Today's Diagnoses     Renal failure    -  1    Microscopic hematuria          Care Instructions    1. [facility]. Arrive to the admissions desk on the main floor of the hospital.   2. We will plan to follow up after you are discharged          Follow-ups after your visit        Your next 10 appointments already scheduled     Jun 22, 2017 11:20 AM CDT   Return Visit with RHODA Viera CNP   [facility]    [address] [place] [id number] [phone]            Jul 27, 2017 11:00 AM CDT   Return Visit with Thaddeus King MD   [facility] ([address] [id number] [phone]            Aug 08, 2017  2:00 PM CDT   New Visit with [facility]ibha Barfield MD   [facility]    [address] [place] [id number] [phone]              Future tests that were ordered for you today     Open Future Orders        Priority Expected Expires Ordered    Complement C4 Routine 6/21/2017 6/21/2018 6/21/2017    Complement C3 Routine 6/21/2017 6/21/2018 6/21/2017            Who to contact     If you have questions or need follow up information about today's clinic visit or your schedule please contact [facility] directly at [phone].  Normal or non-critical lab and imaging results will be communicated to you by MyChart, letter or phone within 4 business days after the clinic has received the results. If you do not hear from us within 7 days, please contact the clinic through MyChart or phone. If you have a critical or  "abnormal lab result, we will notify you by phone as soon as possible.  Submit refill requests through [employer] or call your pharmacy and they will forward the refill request to us. Please allow 3 business days for your refill to be completed.          Additional Information About Your Visit        [employer] Information     [employer] is an electronic gateway that provides easy, online access to your medical records. With [employer], you can request a clinic appointment, read your test results, renew a prescription or communicate with your care team.     To sign up for [employer] visit the website at [email or web address]   You will be asked to enter the access code listed below, as well as some personal information. Please follow the directions to create your username and password.     Your access code is: [email or web address]  Expires: 2017 11:18 AM     Your access code will  in 90 days. If you need help or a new code, please contact your [facility] or call [phone] for assistance.        Care EveryWhere ID     This is your Care EveryWhere ID. This could be used by other organizations to access your [place] medical records  [id number]        Your Vitals Were     Pulse Temperature Height BMI (Body Mass Index)          73 98.3  F (36.8  C) (Oral) 5' 4\" (1.626 m) 28.6 kg/m2         Blood Pressure from Last 3 Encounters:   17 123/71   17 117/65   17 104/62    Weight from Last 3 Encounters:   17 166 lb 9.6 oz (75.6 kg)   17 161 lb (73 kg)   17 168 lb 3.2 oz (76.3 kg)              We Performed the Following     Antineutrophil cytoplasmic [place] IgG     GBM Antibody IgG          Today's Medication Changes          These changes are accurate as of: 17 11:49 AM.  If you have any questions, ask your nurse or doctor.               These medicines have changed or have updated prescriptions.        Dose/Directions    warfarin 2.5 MG tablet   Commonly known " as:  COUMADIN   This may have changed:  additional instructions   Used for:  Atrial fibrillation with rapid ventricular response (H), Long-term (current) use of anticoagulants        Take 1 tablet (2.5 mg) daily until INR is rechecked within 3 days   Quantity:  10 tablet   Refills:  0                Primary Care Provider Office Phone # Fax #    Dheeraj Jj [phone]       [facility] [address] [place] [address]        Equal Access to Services     CAROLINE BLAND AH: Hadii aad ku hadasho Soomaali, waaxda luqadaha, qaybta kaalmada adeegyada, waxay idiin hayaan adeeg kharash la'aan . So [facility] [phone].    ATENCIÓN: Si habla español, tiene a medrano disposición servicios gratuitos de asistencia lingüística. [facility] [phone].    We comply with applicable federal civil rights laws and [place] laws. We do not discriminate on the basis of race, color, national origin, age, disability sex, sexual orientation or gender identity.            Thank you!     Thank you for choosing [facility]  for your care. Our goal is always to provide you with excellent care. Hearing back from our patients is one way we can continue to improve our services. Please take a few minutes to complete the written survey that you may receive in the mail after your visit with us. Thank you!             Your Updated Medication List - Protect others around you: Learn how to safely use, store and throw away your medicines at www.disposemymeds.org.          This list is accurate as of: 6/21/17 11:49 AM.  Always use your most recent med list.                   Brand Name Dispense Instructions for use Diagnosis    acetaminophen 325 MG tablet    TYLENOL    100 tablet    Take 2 tablets (650 mg) by mouth every 4 hours as needed for mild pain    Acute on chronic renal failure (H)       bumetanide 2 MG tablet    BUMEX     Take 1 tablet (2 mg) by mouth daily    Acute on chronic renal failure (H)        calcium carbonate-vitamin D 500-400 MG-UNIT Tabs per tablet      Take 1 tablet by mouth daily    Closed intertrochanteric fracture of left hip, initial encounter (H)       cyanocobalamin 1000 MCG/ML injection    VITAMIN B12    1 mL    Inject 1 mL (1,000 mcg) into the muscle every 30 days    Vitamin B12 deficiency (non anemic)       ferrous sulfate 325 (65 FE) MG tablet    IRON    100 tablet    Take 1 tablet (325 mg) by mouth daily (with breakfast)    Iron deficiency anemia secondary to inadequate dietary iron intake       gabapentin 300 MG capsule    NEURONTIN     Take 1 capsule (300 mg) by mouth At Bedtime    Diabetic polyneuropathy associated with type 2 diabetes mellitus (H)       isosorbide mononitrate 30 MG 24 hr tablet    IMDUR     Take 1 tablet (30 mg) by mouth daily    Hx of non-ST elevation myocardial infarction (NSTEMI)       metolazone 5 MG tablet    ZAROXOLYN    90 tablet    Take 1 tablet (5 mg) by mouth daily as needed For weight gain more than 2 pounds in 1 day or 5 pounds in 1 week    Acute on chronic renal failure (H)       metoprolol 25 MG tablet    LOPRESSOR    60 tablet    Take 2 tablets (50 mg) by mouth 2 times daily    Atrial fibrillation with rapid ventricular response (H)       nitroglycerin 0.4 MG sublingual tablet    NITROSTAT    25 tablet    Place 1 tablet (0.4 mg) under the tongue every 5 minutes as needed for chest pain    Hx of non-ST elevation myocardial infarction (NSTEMI), Atherosclerosis of native coronary artery of native heart without angina pectoris, Postsurgical aortocoronary bypass status       order for DME     1 Device    Equipment being ordered: cpap machine, mask, humidifier, tubing and filters    ANABEL (obstructive sleep apnea)       pramipexole 0.5 MG tablet    MIRAPEX     Take 3 tablets (1.5 mg) by mouth every evening    Restless legs syndrome (RLS)       pravastatin 40 MG tablet    PRAVACHOL    30 tablet    Take 1 tablet (40 mg) by mouth daily    Hx of non-ST elevation  myocardial infarction (NSTEMI), Atherosclerosis of native coronary artery of native heart without angina pectoris       warfarin 2.5 MG tablet    COUMADIN    10 tablet    Take 1 tablet (2.5 mg) daily until INR is rechecked within 3 days    Atrial fibrillation with rapid ventricular response (H), Long-term (current) use of anticoagulants

## 2017-06-21 NOTE — PROGRESS NOTES
Pt a/o x 4; VSS; arrived with , but he has gone home. Pt denies pain or swelling, but legs are very tight. Pt stated that she has gained 20+ pounds over the last 30 days.     12cm laceration on left shin, L-shaped cut, some serosanguinous drainage. Pt stated that she hit her leg on the car door 6/20/2017, put on clean dressing.    Had IV access done, waiting for additional orders.

## 2017-06-21 NOTE — NURSING NOTE
Called report to RSOARIO Hernandez on unit [address] at [facility]. Updated Kathryn that bed was ready. C: [phone].    Arlet Webb RN, BSN  Nephrology Care Coordinator  [facility]

## 2017-06-21 NOTE — PHARMACY-ANTICOAGULATION SERVICE
Clinical Pharmacy - Warfarin Dosing Consult     Pharmacy has been consulted to manage this patient s warfarin therapy.  Indication: Atrial Fibrillation  Therapy Goal: INR 2-3  Warfarin Prior to Admission: Yes  Warfarin PTA Regimen: 7.5mg FRI, 5mg the rest of the week.  Recent documented change in oral intake/nutrition: Unknown    INR   Date Value Ref Range Status   06/21/2017 2.40 (H) 0.86 - 1.14 Final   06/16/2017 3.43 (H) 0.86 - 1.14 Final       Recommend warfarin 5 mg today.  Pharmacy will monitor Kathryn Banks daily and order warfarin doses to achieve specified goal.      Please contact pharmacy as soon as possible if the warfarin needs to be held for a procedure or if the warfarin goals change.      Gilda Rowe, PharmD

## 2017-06-21 NOTE — PROGRESS NOTES
[facility]  Transfer Triage Note    Date of call: 06/21/17  Time of call: 10:53 AM  Location: [facility]    Reason for Transfer:Patient has established care here  Diagnosis: Acute Kidney Injury    Outside Records: Available  Additional records requested to be faxed to [phone].    Stability of Patient: Patient is vitally stable, with no critical labs, and will likely remain stable throughout the transfer process    Expected Time of Arrival for Transfer: 0-8 hours    Recommendations for Management and Stabilization: Not needed    Additional Comments:  78 yo with CKD (baseline cr 1.5 - 1.7) and HFpEF. Admitted at [facility] 5/31 with anasarca. Diuresed very aggressively. Weight 225 on 6/2 to 168 on 6/6. Significant Cr bump with this. Didn't take diuretics at home. Readmitted 6/12 with weight back up to 180 lb. Diuresed again inpatient and discharged again with creatinine rising. Up to 4.3 in nephrology clinic this am. .     Dr. Barfield with nephrology feels she is dry and needs fluid, but is very tenuous with ability to tolerate volume, so needs to be watched closely for volume overload. Nephrology consult team will be aware of her. Low threshold to consult if having issues.     Will start out as obs.      Benja Zhao MD

## 2017-06-21 NOTE — PROGRESS NOTES
Nephrology Initial Consult  June 21, 2017    [facility]    Kathryn Banks MRN:[id number] YOB: 1940  Date of Admission:(Not on file)  Primary care provider: Dheeraj Jj  Requesting physician: No att. providers found    ASSESSMENT AND RECOMMENDATIONS:    77 year old female with PMH of Type 2 DM, CAD s/p CABG x 5 , chronic atrial fibrillation, HTN, restless leg syndrome, pulmonary HTN, diastolic heart failure , anemia, CKD, presenting to us today for evaluation of MARTÍN on CKD.     Recommendations :  1. MARTÍN on CKD Stage 3:  - CKD: risk factors for CKD include diabetes, hypertension, previous MARTÍN events, and the potential of hemodynamic variability in the setting of her cardiac disease (potential cardiorenal).   - Most recent MARTÍN occurred in the setting of heart failure/aggressive diuresis. At this time, I question volume depleted state in the setting of loose stools and recent diuretics. Recommend gentle hydration and holding diuretics with hopes of optimizing renal function. Given heart failure hx, I feel it would be safest to have this done in a controlled setting and therefore admitting to hospital today.   - If lack of renal recovery, she may be needing HD support. My hope is renal recovery to a level that this is not needed long term but will need to be monitored closely.   - Given hematuria noted on UAs, recommend C3, C4, ANCA, and Anti-GBM to be done as well to assure no other process occurring.     2. Hypertension: controlled on metoprolol 50 mg BID and bumex 2 mg daily. Metolazone 5 mg prn- has not received since hospitalization.     3. Mild hypernatremia - water depleted in the setting of diuresis - feel she is prerenal at this time.     4. Serum HCO3 18: recommend starting sodium bicarbonate 650 mg to be taken twice daily for replacement.     5. Anemia: hemoglobin 9 - she is currently taking ferrous sulfate daily - will get iron studies.     6. Volume: recent hypervolemia  but now I feel she is volume depleted  - Recommend gentle hydration   - Recommend holding diuretic for the time being.     Discussed with Dr Barfield. Patient being admitted to [facility].     U Carmen Amin MD   [phone].       REASON FOR CONSULT: MARTÍN on CKD     HISTORY OF PRESENT ILLNESS:  77 year old female with PMH of Type 2 DM, CAD with CABG x 5 , chronic atrial fibrillation, HTN, restless leg syndrome, pulmonary HTN, diastolic heart failure , anemia, CKD Stage 4 with baseline serum creatinine 2.4-2.8 seen in Nephrology clinic for MARTÍN on CKD . Ms. Banks has had multiple MARTÍN episodes in the past with CKD since ~2013. Baseline serum Cr appeared to be ~1.7-1.9 prior to recent hospitalizations. CKD likely related to DM, HTN, renovascular disease and from previous MARTÍN episodes. No diabetic retinopathy documented but patient have not had an eye exam recently. MARTÍN episode noted during hospitalization from 5/31/2017 with serum Cr peaking to 3 most likely a combination of cardiorenal syndrome and from hemodynamic variations in the setting of atrial fibrillation with RVR. No evidence of structural kidney disease in recent US kidneys.  Upon discharge from hospital on 6/6/2017 serum Cr was  2.76 . Underwent ablation procedure for atrial fibrillation on 6/7/2017. Had non compliance to diuretics and later re hospitalzation on 6/12/17 with weight gain.  On admission serum Cr was 2.49 . IV diuresis resumed but noted to have rising serum Cr to 4 on discharge on 6/16/2017 and referred to Nephrology clinic.     serum Cr 4.2 during clinic visit. Patient on bumex 2 mg daily and metolazone 5 mg prn. Confirmed with TCU staff- patient has not been getting metolazone at TCU. Reviewed weights from TCU ranging between 160-165. Patient with elevated BUN and mild hypernatremia during clinic visit. She has chronic vertigo and dizziness upon standing from a seated position since years. Patient denies chest pain, SOB, nausea, vomiting, loss of  appetite. Lower extremity swelling has markedly improved. Good sleep. Accompanied by . Patient currently staying at TCU for rehab. Patient also reported diarrhea - states that this has been 1-2 loose stools today; 2 yesterday.  Currently on Keflex for possible UTI. Patient had cloudy urine and dysuria. Patient started on Keflex at TCU yesterday.    PAST MEDICAL HISTORY:  Reviewed with patient on 06/21/2017     Past Medical History:   Diagnosis Date     Carpal tunnel syndrome      Coronary atherosclerosis of native coronary artery 2006    5 vessel CABG     OBSTRUCTIVE SLEEP APNEA     using CPAP     Osteoarthrosis, unspecified whether generalized or localized, unspecified site     generalized arthritis, particularly in her hands and feet         Other and combined forms of senile cataract 2000    Bilateral     Other and unspecified hyperlipidemia      RESTLESS LEGS SYNDROME      TENOSYNOV HAND/WRIST NEC 6/30/2006     Type II or unspecified type diabetes mellitus without mention of complication, not stated as uncontrolled 2001    Diabetes mellitus/pt is diet controlled with weight loss     Typical atrial flutter (H) 01/17/2007     Unspecified essential hypertension        Past Surgical History:   Procedure Laterality Date     ANGIOPLASTY       C APPENDECTOMY       C CABG, VEIN, FIVE  12/06    SVG x 4 and LIMA to LAD     C SOTO W/O FACETEC FORAMOT/DSKC 1/2 VRT SEG, LUMBAR  1968     C REMV CATARACT INTRACAP,INSERT LENS      Bilateral     C TOTAL ABDOM HYSTERECTOMY  1995     - fibroids     C VAGOTOMY/PYLOROPLASTY,SELECT  1970     COLONOSCOPY  12/3/2007     COLONOSCOPY  1/17/2014    Procedure: COLONOSCOPY;  Colonoscopy;  Surgeon: Zack Stearns MD;  Location:  GI     CYSTOSCOPY  11/25/09    [facility]     ENDOSCOPY  03/21/2000    Upper GI     HC COLONOSCOPY THRU STOMA, DIAGNOSTIC  10/7/04    poor prep, repeat in 2-3 years     HC COLONOSCOPY W/WO BRUSH/WASH  10/31/07    Repeat-poor quality prep     HC REMOVAL OF  OVARY/TUBE(S)      Salpingo-Oophorectomy, Unilateral     HC REVISE MEDIAN N/CARPAL TUNNEL SURG      Carpal tunnel release     HC UGI ENDOSCOPY DIAG W BIOPSY  10/31/07     HC UGI ENDOSCOPY, SIMPLE EXAM  12/3/2007     OPEN REDUCTION INTERNAL FIXATION HIP NAILING Left 7/3/2016    Procedure: OPEN REDUCTION INTERNAL FIXATION HIP NAILING;  Surgeon: Lake Schulz MD;  Location:  OR        MEDICATIONS:  PTA Meds  Prior to Admission medications    Medication Sig Last Dose Taking? Auth Provider   warfarin (COUMADIN) 2.5 MG tablet Take 1 tablet (2.5 mg) daily until INR is rechecked within 3 days  Patient taking differently: 6/19 INR 3.1 Give 1 mg daily next INR 6/26 Taking  Arash Silva MD   bumetanide (BUMEX) 2 MG tablet Take 1 tablet (2 mg) by mouth daily Taking  Arash Silva MD   metolazone (ZAROXOLYN) 5 MG tablet Take 1 tablet (5 mg) by mouth daily as needed For weight gain more than 2 pounds in 1 day or 5 pounds in 1 week Taking  Arash Silva MD   acetaminophen (TYLENOL) 325 MG tablet Take 2 tablets (650 mg) by mouth every 4 hours as needed for mild pain Taking  Arash Silva MD   isosorbide mononitrate (IMDUR) 30 MG 24 hr tablet Take 1 tablet (30 mg) by mouth daily Taking  Arash Silva MD   nitroglycerin (NITROSTAT) 0.4 MG sublingual tablet Place 1 tablet (0.4 mg) under the tongue every 5 minutes as needed for chest pain Taking  Arash Silva MD   gabapentin (NEURONTIN) 300 MG capsule Take 1 capsule (300 mg) by mouth At Bedtime Taking  Arash Silva MD   pravastatin (PRAVACHOL) 40 MG tablet Take 1 tablet (40 mg) by mouth daily Taking  Arash Silva MD   pramipexole (MIRAPEX) 0.5 MG tablet Take 3 tablets (1.5 mg) by mouth every evening Taking  Arash Silva MD   metoprolol (LOPRESSOR) 25 MG tablet Take 2 tablets (50 mg) by mouth 2 times daily Taking  Arash Silva MD   ferrous sulfate (IRON) 325 (65 FE) MG tablet Take 1 tablet (325 mg) by mouth daily (with breakfast) Taking   Arash Silva MD   calcium carbonate-vitamin D 500-400 MG-UNIT TABS per tablet Take 1 tablet by mouth daily Taking  Arash Silva MD   cyanocobalamin (VITAMIN B12) 1000 MCG/ML injection Inject 1 mL (1,000 mcg) into the muscle every 30 days Taking  Dheeraj Jj MD   ORDER FOR DME Equipment being ordered: cpap machine, mask, humidifier, tubing and filters Taking  Dheeraj Jj MD          ALLERGIES:    Allergies   Allergen Reactions     Ciprofloxacin Nausea and Vomiting     Zofran did not help     Oxycodone Visual Disturbance     Delusions, blackouts      Lisinopril Cough     Penicillins Rash     Sulfa Drugs GI Disturbance     LOSS OF TASTE       REVIEW OF SYSTEMS:  A 10 point review of systems was negative except as noted above.    SOCIAL HISTORY:   Social History     Social History     Marital status:      Spouse name: Urbano Banks     Number of children: 2     Years of education: 13     Occupational History     Ministry coordinator      [employer]     Social History Main Topics     Smoking status: Former Smoker     Years: 10.00     Quit date: 1969     Smokeless tobacco: Never Used     Alcohol use 0.0 oz/week     0 Standard drinks or equivalent per week      Comment: occasional- 2 drinks per month     Drug use: No     Sexual activity: Yes     Birth control/ protection: Surgical     Other Topics Concern     Parent/Sibling W/ Cabg, Mi Or Angioplasty Before 65f 55m? No     Social History Narrative     Reviewed with patient  FAMILY MEDICAL HISTORY:   Family History   Problem Relation Age of Onset     DIABETES Father      AODM     Neurologic Disorder Father      Parkinson's     Blood Disease Father       from blood clot from leg to lung immediately after hip fracture     DIABETES Paternal Grandmother      adult onset     DIABETES Maternal Grandmother      adult onset     Hypertension No family hx of      CEREBROVASCULAR DISEASE No family hx of      Reviewed with  "patient    PHYSICAL EXAM:        /71 (BP Location: Left arm, Patient Position: Chair, Cuff Size: Adult Regular)  Pulse 73  Temp 98.3  F (36.8  C) (Oral)  Ht 1.626 m (5' 4\")  Wt 75.6 kg (166 lb 9.6 oz)  BMI 28.6 kg/m2   Orthostatic Vitals     BP Pulse Position Site Cuff Size Time Date    141/72 --- Supine Left Arm Regular 10:34 AM 6/21/2017    109/67 --- Sitting Left Arm Regular 10:35 AM 6/21/2017    130/69 76 Standing Left Arm Regular 10:35 AM 6/21/2017      Admit       GENERAL APPEARANCE: no acute distress.   EYES: no  scleral icterus, pupils equal  HENT: NC/AT,  mouth  without ulcers or lesions  Lymphatics: no cervical or supraclavicular LAD  Pulmonary: lungs clear to auscultation with equal breath sounds bilaterally, no clubbing  CV: regular rhythm, normal rate, no rub  - Edema 1+  GI: soft, nontender, normal bowel sounds, no HSM   MS: no evidence of inflammation in joints, no muscle tenderness  SKIN: no rash, warm, dry, no cyanosis  NEURO: mentation intact and speech normal    LABS:   Electrolytes/Renal -   Recent Labs   Lab Test  06/21/17   0842  06/16/17   0615  06/15/17   0527   05/01/17   0518   04/29/17   1757   10/21/09   0839   NA  146*  143  142   < >  146*   < >  145*   < >  141   POTASSIUM  4.4  4.4  4.3   < >  4.7   < >  5.6*   < >  4.0   CHLORIDE  113*  107  105   < >  122*   < >  120*   < >  113*   CO2  18*  26  24   < >  12*   < >  14*   < >  25   BUN  107*  94*  89*   < >  48*   < >  45*   < >  20   CR  4.26*  4.03*  3.73*   < >  1.93*   < >  1.92*   < >  1.05*   GLC  140*  124*  150*   < >  174*   < >  172*   < >  142*   MALICK  7.6*  7.3*  7.3*   < >  7.2*   < >  7.5*   < >  8.8   MAG   --    --    --    --   2.2   --   2.1   --    --    PHOS  6.0*   --    --    --    --    --    --    --   4.2    < > = values in this interval not displayed.       CBC -   Recent Labs   Lab Test  06/16/17   0615  06/15/17   0527  06/14/17   1750  06/14/17   0551  06/13/17   0516  06/12/17   1250   WBC   " --    --    --   5.9  6.4  8.3   HGB  9.0*  7.9*  9.0*  7.6*  8.0*  8.7*   PLT   --    --    --   232  237  253       LFTs -   Recent Labs   Lab Test  06/21/17   0842  06/12/17   1250  05/31/17   1541  05/24/17   1147   ALKPHOS   --   177*  216*  250*   BILITOTAL   --   0.3  0.3  0.3   ALT   --   47  33  43   AST   --   42  21  20   PROTTOTAL   --   6.1*  5.6*  5.8*   ALBUMIN  2.9*  3.1*  2.5*  2.6*       Coags -   Recent Labs   Lab Test  06/16/17   0615  06/15/17   0527  06/14/17   0551  05/16/14 05/07/14 04/08/11   0935  04/15/10   0540   PT   --    --    --    --   74.1   --    --    --    --    --    INR  3.43*  3.82*  3.29*   < >  6.986   < >  1.5   < >  0.97  0.99   PTT   --    --    --    --    --    --    --    --   26  24    < > = values in this interval not displayed.       Iron Panel -   Recent Labs   Lab Test  06/21/17   0842  06/14/17   1750  08/20/12   1146   IRON  36  26*  32*   IRONSAT  17  11*  11*   GWEN  196  87   --        Endocrine -   Recent Labs   Lab Test  04/28/17   1050  03/31/17   1510  01/10/17   1246  07/03/16   0555  04/12/16   1132   A1C   --   6.2*   --   6.6*  7.1*   TSH  0.75   --   1.03   --   1.56       IMAGING:  All imaging studies reviewed by me.     SHELLEY Amin MD    Attending Note: I have seen and examined this patient and the above note reflects my historical findings, exam findings, and assessment and plan.   Patient with MARTÍN on CKD. MARTÍN is in the setting of aggressive diuresis most recently. Presents today to establish care/follow-up hospitalization from last week. Unfortunately, with ongoing rise in creatinine. At this time I feel she is dehydrated and could benefit from small fluid bolus with following kidney function. She also has loose stools which complicates volume status as well. I feel it is safest to do this in the hospital setting given she is close to needing potential dialysis support if renal recovery is not seen.   - Recommend gentle hydration with  holding diuresis  - Daily follow-up of kidney function to monitor for recovery.   - If dialysis support is needed, I feel she still has potential of renal recovery so would want to monitor closely.   Vibha Barfield, DO

## 2017-06-21 NOTE — H&P
[facility]    Internal Medicine History and Physical - [facility]       Date of Admission:  6/21/2017    Assessment & Plan   Kathryn Banks is a 77 year old female admitted on 6/21/2017. She has a history of CAD s/p CAGB in 2007, Atrial Flutter s/p Ablation (6/7/17), and multiple recent admissions for diastolic heart failure and is admitted for acute kidney injury on chronic kidney disease.     # MARTÍN on CKD: Patient's baseline was previously (1.2-1.9), her renal function has progressively worsened over the last several months with diuresis during her admissions for acute on chronic diastolic heart failure. Progressive renal dysfunction is likely related to ATN related to aggressive diuresis, though pending additional work-up as below. No evidence of nephrotic range proteinuria to explain anasarca and worsening renal function.   - UA suspicious for UTI/asymptomatic bacteriuria, though patient denies symptoms.   - Pending FEUrea, FENA  - Pending GBM work-up per Nephrology   - Nephrology consult  - Hold diuretics     # Diastolic Heart Failure: Weight stable, currently asymptomatic.   - Hold bumex    # Type II DM: Diet controlled, last Hgb A1C 6.2  - SSI    # Atrial Flutter s/p ablation: Patient's pulse is stable  70-80  - Continue Warfarin     # Hypertension  # CAD s/p CABG  - Continue pravastain, metoprolol, umdur    Diet: Regular Diet Adult  Fluids: None  DVT Prophylaxis: Warfarin  Code Status: Full Code    Disposition Plan   Expected discharge: 2 - 3 days; recommended to prior living arrangement once patient's renal function is determined to be stable and there is no indication for dialysis. .     Entered: Erin Pinedo 06/21/2017, 4:09 PM   Information in the above section will display in the discharge planner report.    The patient was discussed with Dr. Stephanie Pinedo  [address]   Pager: [phone]  Please  see sticky note for cross cover information    Chief Complaint   Acute on chronic kidney injury.     History is obtained from the patient and the patient's chart.     History of Present Illness   Kathryn Banks is a 77 year old female  who has a history of CAD s/p CAGB in 2007, Atrial Flutter s/p Ablation (6/7/17), and multiple recent admissions for diastolic heart failure and is admitted for acute kidney injury on chronic kidney disease.        Patient has been admitted multiple times including 5/31/17-6/6/17, 6/12/16-6/16/17 for acute on chronic heart failure, and her creatinine increased with each hospitalization (2.29-> 3.10->2.39) then the following hospitalization (2.49->4.03). After this most recent hospitalization the patient was thought to have developed ATN secondary to aggressive diuresis (FENa was 14.3%, random urine protein 1.3 g/g creatinine).        Since that hospitalization the patient states that she has been doing well. She has been taking her bumex, and has been urinating regularly. She states that she has had small amounts of blood in the toilet (though she thinks it is from her hemorrhoids rather than from her urine). She denies any increased urinary frequency, urgency, or burning. Her weight has been stable since her hospitalization (160-165), and she denies any lower extremity swelling or shortness of breath. She does have chronic diarrhea, but denies any nausea or vomiting. She has been eating and drinking normally. She has otherwise been feeling well and denies any additional symptoms including weakness, confusion.      Review of Systems   The 10 point Review of Systems is negative other than noted in the HPI or here.     Past Medical History    I have reviewed this patient's medical history.   Past Medical History:   Diagnosis Date     Carpal tunnel syndrome      Coronary atherosclerosis of native coronary artery 2006    5 vessel CABG     OBSTRUCTIVE SLEEP APNEA     using CPAP      Osteoarthrosis, unspecified whether generalized or localized, unspecified site     generalized arthritis, particularly in her hands and feet         Other and combined forms of senile cataract 2000    Bilateral     Other and unspecified hyperlipidemia      RESTLESS LEGS SYNDROME      TENOSYNOV HAND/WRIST NEC 6/30/2006     Type II or unspecified type diabetes mellitus without mention of complication, not stated as uncontrolled 2001    Diabetes mellitus/pt is diet controlled with weight loss     Typical atrial flutter (H) 01/17/2007     Unspecified essential hypertension       Past Surgical History   I have reviewed this patient's surgical history.  Past Surgical History:   Procedure Laterality Date     ANGIOPLASTY       C APPENDECTOMY       C CABG, VEIN, FIVE  12/06    SVG x 4 and LIMA to LAD     C SOTO W/O FACETEC FORAMOT/DSKC 1/2 VRT SEG, LUMBAR  1968     C REMV CATARACT INTRACAP,INSERT LENS      Bilateral     C TOTAL ABDOM HYSTERECTOMY  1995     - fibroids     C VAGOTOMY/PYLOROPLASTY,SELECT  1970     COLONOSCOPY  12/3/2007     COLONOSCOPY  1/17/2014    Procedure: COLONOSCOPY;  Colonoscopy;  Surgeon: Zack Stearns MD;  Location:  GI     CYSTOSCOPY  11/25/09    [facility]     ENDOSCOPY  03/21/2000    Upper GI     HC COLONOSCOPY THRU STOMA, DIAGNOSTIC  10/7/04    poor prep, repeat in 2-3 years     HC COLONOSCOPY W/WO BRUSH/WASH  10/31/07    Repeat-poor quality prep     HC REMOVAL OF OVARY/TUBE(S)      Salpingo-Oophorectomy, Unilateral     HC REVISE MEDIAN N/CARPAL TUNNEL SURG      Carpal tunnel release     HC UGI ENDOSCOPY DIAG W BIOPSY  10/31/07     HC UGI ENDOSCOPY, SIMPLE EXAM  12/3/2007     OPEN REDUCTION INTERNAL FIXATION HIP NAILING Left 7/3/2016    Procedure: OPEN REDUCTION INTERNAL FIXATION HIP NAILING;  Surgeon: Lake Schulz MD;  Location:  OR      Social History   Social History   Substance Use Topics     Smoking status: Former Smoker     Years: 10.00     Quit date: 1/1/1969     Smokeless  tobacco: Never Used     Alcohol use 0.0 oz/week     0 Standard drinks or equivalent per week      Comment: occasional- 2 drinks per month     Family History   I have reviewed this patient's family history.   Family History   Problem Relation Age of Onset     DIABETES Father      AODM     Neurologic Disorder Father      Parkinson's     Blood Disease Father       from blood clot from leg to lung immediately after hip fracture     DIABETES Paternal Grandmother      adult onset     DIABETES Maternal Grandmother      adult onset     Hypertension No family hx of      CEREBROVASCULAR DISEASE No family hx of        Prior to Admission Medications   Prior to Admission Medications   Prescriptions Last Dose Informant Patient Reported? Taking?   ORDER FOR DME   No No   Sig: Equipment being ordered: cpap machine, mask, humidifier, tubing and filters   acetaminophen (TYLENOL) 325 MG tablet   No No   Sig: Take 2 tablets (650 mg) by mouth every 4 hours as needed for mild pain   bumetanide (BUMEX) 2 MG tablet   No No   Sig: Take 1 tablet (2 mg) by mouth daily   calcium carbonate-vitamin D 500-400 MG-UNIT TABS per tablet   No No   Sig: Take 1 tablet by mouth daily   cyanocobalamin (VITAMIN B12) 1000 MCG/ML injection   No No   Sig: Inject 1 mL (1,000 mcg) into the muscle every 30 days   ferrous sulfate (IRON) 325 (65 FE) MG tablet   No No   Sig: Take 1 tablet (325 mg) by mouth daily (with breakfast)   gabapentin (NEURONTIN) 300 MG capsule   No No   Sig: Take 1 capsule (300 mg) by mouth At Bedtime   isosorbide mononitrate (IMDUR) 30 MG 24 hr tablet   No No   Sig: Take 1 tablet (30 mg) by mouth daily   metolazone (ZAROXOLYN) 5 MG tablet   No No   Sig: Take 1 tablet (5 mg) by mouth daily as needed For weight gain more than 2 pounds in 1 day or 5 pounds in 1 week   metoprolol (LOPRESSOR) 25 MG tablet   No No   Sig: Take 2 tablets (50 mg) by mouth 2 times daily   nitroglycerin (NITROSTAT) 0.4 MG sublingual tablet   No No   Sig: Place 1  tablet (0.4 mg) under the tongue every 5 minutes as needed for chest pain   pramipexole (MIRAPEX) 0.5 MG tablet   No No   Sig: Take 3 tablets (1.5 mg) by mouth every evening   pravastatin (PRAVACHOL) 40 MG tablet   No No   Sig: Take 1 tablet (40 mg) by mouth daily   warfarin (COUMADIN) 2.5 MG tablet   No No   Sig: Take 1 tablet (2.5 mg) daily until INR is rechecked within 3 days   Patient taking differently: 6/19 INR 3.1 Give 1 mg daily next INR 6/26      Facility-Administered Medications: None     Allergies   Allergies   Allergen Reactions     Ciprofloxacin Nausea and Vomiting     Zofran did not help     Oxycodone Visual Disturbance     Delusions, blackouts      Lisinopril Cough     Penicillins Rash     Sulfa Drugs GI Disturbance     LOSS OF TASTE     Physical Exam   Vital Signs: Temp: 97.5  F (36.4  C) Temp src: Axillary BP: 125/67 Pulse: 81   Resp: 18 SpO2: 96 % O2 Device: None (Room air)    Weight: 167 lbs 4.8 oz    General Appearance: Alert, NAD, lying in bed   Eyes: sclera anicteric, PERRL  HEENT: mmm, nc/at  Respiratory: CTAB   Cardiovascular: RRR, soft systolic murmur  GI: soft, non-tender, slightly distended  Skin: clean dry intact, bandage over left shin  Musculoskeletal: normal muscle bulk and tone  Neurologic: AOx3, moving all extremities, no myoclonic jerks while I was in the room,   Psychiatric: slightly flat affect, normal range     Data   Data     Recent Labs  Lab 06/21/17  1536 06/21/17  0842 06/16/17  0615 06/15/17  0527   WBC 6.0  --   --   --    HGB 9.0*  --  9.0* 7.9*   MCV 78  --   --   --      --   --   --    INR  --   --  3.43* 3.82*   NA  --  146* 143 142   POTASSIUM  --  4.4 4.4 4.3   CHLORIDE  --  113* 107 105   CO2  --  18* 26 24   BUN  --  107* 94* 89*   CR  --  4.26* 4.03* 3.73*   ANIONGAP  --  15* 10 13   MALICK  --  7.6* 7.3* 7.3*   GLC  --  140* 124* 150*   ALBUMIN  --  2.9*  --   --      No results found for this or any previous visit (from the past 24 hour(s)).

## 2017-06-21 NOTE — IP AVS SNAPSHOT
MRN:[id number]                      After Visit Summary   6/21/2017    Kathryn Banks    MRN: [id number]           Thank you!     Thank you for choosing [facility] for your care. Our goal is always to provide you with excellent care. Hearing back from our patients is one way we can continue to improve our services. Please take a few minutes to complete the written survey that you may receive in the mail after you visit with us. Thank you!        Patient Information     Date Of Birth          1940        Designated Caregiver       Most Recent Value    Caregiver    Will someone help with your care after discharge? yes    Name of designated caregiver Lakesha    Phone number of caregiver [phone]    Caregiver address [facility]      About your hospital stay     You were admitted on:  June 21, 2017 You last received care in the:  [address] [place] [address]    You were discharged on:  June 25, 2017        Reason for your hospital stay       You were hospitalized for concerning changes in your kidney function. You kidney function remains severe reduced but did improve with holding your diuretics and giving you some intravenous fluids                  Who to Call     For medical emergencies, please call 911.  For non-urgent questions about your medical care, please call your primary care provider or clinic, [phone]          Attending Provider     Provider Specialty    El Zhao MD Internal Medicine    Andrew, Varsha Simmons MD Internal Medicine    Stephanie, Erica Erickson MD Internal Medicine    Aguilar, Javan Harris MD Pediatrics       Primary Care Provider Office Phone # Fax #    Dheeraj Jj [phone]      After Care Instructions     Activity       Your activity upon discharge: You have no specific activity restrictions but will need to be cautious to avoid falls. While you have regained much strength, you remain with some balance problems and  should not be home alone for prolonged periods of time            Diet       Follow this diet upon discharge: Your diet should be low in salt, no more than 2-3 grams daily.                  Follow-up Appointments     Adult [facility] Follow-up and recommended labs and tests       Follow up with primary care provider, Dheeraj Jj, within 7 days to evaluate medication change and for hospital follow- up.  No follow up labs or test are needed.    Follow up with your kidney specialist within 2-4 weeks  for hospital follow- up. You should be expecting a call from you kidney doctor about scheduling weekly lab checks until your kidney function has stabilized. The following labs/tests are recommended: Renal panel.    Appointments on [place] and/or [facility] (with [facility] or [facility] provider or service). Call [phone] if you haven't heard regarding these appointments within 7 days of discharge.                  Your next 10 appointments already scheduled     Jul 27, 2017 11:00 AM CDT   [facility]    [address] [place] [id number] [phone]              Additional Services     Physical Therapy Referral       *This therapy referral will be filtered to a centralized scheduling office at [facility] and the patient will receive a call to schedule an appointment at a [facility] location most convenient for them. *     [facility] provides Physical Therapy evaluation and treatment and many specialty services across the [facility] system.  If requesting a specialty program, please choose from the list below.    If you have not heard from the scheduling office within 2 business days, please call [phone] for all locations, with the exception of [place], please call [phone].  Treatment: Evaluation & Treatment  Special Instructions/Modalities: Balance, strength, and mobility  Special  "Programs: Balance/Vestibular    Please be aware that coverage of these services is subject to the terms and limitations of your health insurance plan.  Call member services at your health plan with any benefit or coverage questions.      **Note to Provider:  If you are referring outside of [facility] for the therapy appointment, please list the name of the location in the \"special instructions\" above, print the referral and give to the patient to schedule the appointment.                  Pending Results     No orders found from 2017 to 2017.            Statement of Approval     Ordered          17 1205  I have reviewed and agree with all the recommendations and orders detailed in this document.  EFFECTIVE NOW     Approved and electronically signed by:  Javan Sheikh MD             Admission Information     Date & Time Department Dept. Phone    2017 [address] [facility] [phone]      Your Vitals Were     Blood Pressure Pulse Temperature Respirations Height Weight    135/69 (BP Location: Right arm) 78 99.6  F (37.6  C) 16 1.626 m (5' 4\") 75.8 kg (167 lb)    Pulse Oximetry BMI (Body Mass Index)                96% 28.67 kg/m2          MyChart Information     [facility] lets you send messages to your doctor, view your test results, renew your prescriptions, schedule appointments and more. To sign up, go to [email or web address] . Click on \"Log in\" on the left side of the screen, which will take you to the Welcome page. Then click on \"Sign up Now\" on the right side of the page.     You will be asked to enter the access code listed below, as well as some personal information. Please follow the directions to create your username and password.     Your access code is: [email or web address]  Expires: 2017 11:18 AM     Your access code will  in 90 days. If you need help or a new code, please call your [facility] clinic or [phone].        Care EveryWhere ID     This is your Care " EveryWhere ID. This could be used by other organizations to access your [facility] medical records  [id number]        Equal Access to Services     CAROLINE BLAND : Tl Garcia, ryan pham, boubacarjimenez ochoaquintenmely paullorimely, tay chacon kalebangel saraviaюлия banda sukhdev murillo. So [facility] [phone].    ATENCIÓN: Si habla español, tiene a medrano disposición servicios gratuitos de asistencia lingüística. Llame al [phone].    We comply with applicable federal civil rights laws and [place] laws. We do not discriminate on the basis of race, color, national origin, age, disability sex, sexual orientation or gender identity.               Review of your medicines      START taking        Dose / Directions    ciprofloxacin 500 MG tablet   Commonly known as:  CIPRO   Indication:  Urinary Tract Infection   Used for:  Acute cystitis without hematuria        Dose:  500 mg   Start taking on:  6/26/2017   Take 1 tablet (500 mg) by mouth daily for 2 doses   Quantity:  2 tablet   Refills:  0       sodium bicarbonate 650 MG tablet   Used for:  Stage 4 chronic kidney disease (H)        Dose:  650 mg   Take 1 tablet (650 mg) by mouth 3 times daily   Quantity:  90 tablet   Refills:  0         CONTINUE these medicines which may have CHANGED, or have new prescriptions. If we are uncertain of the size of tablets/capsules you have at home, strength may be listed as something that might have changed.        Dose / Directions    warfarin 2.5 MG tablet   Commonly known as:  COUMADIN   This may have changed:  additional instructions   Used for:  Atrial fibrillation with rapid ventricular response (H), Long-term (current) use of anticoagulants        Take 1 tablet (2.5 mg) daily until INR is rechecked within 3 days   Quantity:  10 tablet   Refills:  0         CONTINUE these medicines which have NOT CHANGED        Dose / Directions    acetaminophen 325 MG tablet   Commonly known as:  TYLENOL   Used for:  Acute on chronic renal failure (H)         Dose:  650 mg   Take 2 tablets (650 mg) by mouth every 4 hours as needed for mild pain   Quantity:  100 tablet   Refills:  0       calcium carbonate-vitamin D 500-400 MG-UNIT Tabs per tablet   Used for:  Closed intertrochanteric fracture of left hip, initial encounter        Dose:  1 tablet   Take 1 tablet by mouth daily   Refills:  0       cyanocobalamin 1000 MCG/ML injection   Commonly known as:  VITAMIN B12   Used for:  Vitamin B12 deficiency (non anemic)        Dose:  1 mL   Inject 1 mL (1,000 mcg) into the muscle every 30 days   Quantity:  1 mL   Refills:  11       ferrous sulfate 325 (65 FE) MG tablet   Commonly known as:  IRON   Used for:  Iron deficiency anemia secondary to inadequate dietary iron intake        Dose:  325 mg   Take 1 tablet (325 mg) by mouth daily (with breakfast)   Quantity:  100 tablet   Refills:  0       gabapentin 300 MG capsule   Commonly known as:  NEURONTIN   Used for:  Diabetic polyneuropathy associated with type 2 diabetes mellitus (H)        Dose:  300 mg   Take 1 capsule (300 mg) by mouth At Bedtime   Refills:  0       isosorbide mononitrate 30 MG 24 hr tablet   Commonly known as:  IMDUR   Used for:  Hx of non-ST elevation myocardial infarction (NSTEMI)        Dose:  30 mg   Take 1 tablet (30 mg) by mouth daily   Refills:  0       metoprolol 25 MG tablet   Commonly known as:  LOPRESSOR   Used for:  Atrial fibrillation with rapid ventricular response (H)        Dose:  50 mg   Take 2 tablets (50 mg) by mouth 2 times daily   Quantity:  60 tablet   Refills:  0       nitroglycerin 0.4 MG sublingual tablet   Commonly known as:  NITROSTAT   Used for:  Hx of non-ST elevation myocardial infarction (NSTEMI), Atherosclerosis of native coronary artery of native heart without angina pectoris, Postsurgical aortocoronary bypass status        Dose:  0.4 mg   Place 1 tablet (0.4 mg) under the tongue every 5 minutes as needed for chest pain   Quantity:  25 tablet   Refills:  0       order for  DME   Used for:  ANABEL (obstructive sleep apnea)        Equipment being ordered: cpap machine, mask, humidifier, tubing and filters   Quantity:  1 Device   Refills:  0       pramipexole 0.5 MG tablet   Commonly known as:  MIRAPEX   Used for:  Restless legs syndrome (RLS)        Dose:  1.5 mg   Take 3 tablets (1.5 mg) by mouth every evening   Refills:  0       pravastatin 40 MG tablet   Commonly known as:  PRAVACHOL   Used for:  Hx of non-ST elevation myocardial infarction (NSTEMI), Atherosclerosis of native coronary artery of native heart without angina pectoris        Dose:  40 mg   Take 1 tablet (40 mg) by mouth daily   Quantity:  30 tablet   Refills:  0         STOP taking     bumetanide 2 MG tablet   Commonly known as:  BUMEX           metolazone 5 MG tablet   Commonly known as:  ZAROXOLYN                Where to get your medicines      These medications were sent to [facility] [place], [place] - [address] [address]     Phone:  [phone]     ciprofloxacin 500 MG tablet    sodium bicarbonate 650 MG tablet                Protect others around you: Learn how to safely use, store and throw away your medicines at www.disposemymeds.org.             Medication List: This is a list of all your medications and when to take them. Check marks below indicate your daily home schedule. Keep this list as a reference.      Medications           Morning Afternoon Evening Bedtime As Needed    acetaminophen 325 MG tablet   Commonly known as:  TYLENOL   Take 2 tablets (650 mg) by mouth every 4 hours as needed for mild pain                                calcium carbonate-vitamin D 500-400 MG-UNIT Tabs per tablet   Take 1 tablet by mouth daily                                ciprofloxacin 500 MG tablet   Commonly known as:  CIPRO   Take 1 tablet (500 mg) by mouth daily for 2 doses   Start taking on:  6/26/2017   Last time this was given:  500 mg on 6/25/2017  9:47 AM                                 cyanocobalamin 1000 MCG/ML injection   Commonly known as:  VITAMIN B12   Inject 1 mL (1,000 mcg) into the muscle every 30 days                                ferrous sulfate 325 (65 FE) MG tablet   Commonly known as:  IRON   Take 1 tablet (325 mg) by mouth daily (with breakfast)   Last time this was given:  325 mg on 6/25/2017 12:34 PM                                gabapentin 300 MG capsule   Commonly known as:  NEURONTIN   Take 1 capsule (300 mg) by mouth At Bedtime                                isosorbide mononitrate 30 MG 24 hr tablet   Commonly known as:  IMDUR   Take 1 tablet (30 mg) by mouth daily   Last time this was given:  30 mg on 6/25/2017  9:46 AM                                metoprolol 25 MG tablet   Commonly known as:  LOPRESSOR   Take 2 tablets (50 mg) by mouth 2 times daily   Last time this was given:  50 mg on 6/25/2017  9:47 AM                                nitroglycerin 0.4 MG sublingual tablet   Commonly known as:  NITROSTAT   Place 1 tablet (0.4 mg) under the tongue every 5 minutes as needed for chest pain                                order for DME   Equipment being ordered: cpap machine, mask, humidifier, tubing and filters                                pramipexole 0.5 MG tablet   Commonly known as:  MIRAPEX   Take 3 tablets (1.5 mg) by mouth every evening   Last time this was given:  1 mg on 6/24/2017 11:05 PM                                pravastatin 40 MG tablet   Commonly known as:  PRAVACHOL   Take 1 tablet (40 mg) by mouth daily   Last time this was given:  40 mg on 6/25/2017  9:46 AM                                sodium bicarbonate 650 MG tablet   Take 1 tablet (650 mg) by mouth 3 times daily   Last time this was given:  650 mg on 6/25/2017  9:46 AM                                warfarin 2.5 MG tablet   Commonly known as:  COUMADIN   Take 1 tablet (2.5 mg) daily until INR is rechecked within 3 days   Last time this was given:  7.5 mg on 6/23/2017   5:11 PM

## 2017-06-22 LAB
ALBUMIN UR-MCNC: 10 MG/DL
ANCA IGG TITR SER IF: NORMAL {TITER}
ANION GAP SERPL CALCULATED.3IONS-SCNC: 13 MMOL/L (ref 3–14)
APPEARANCE UR: ABNORMAL
BACTERIA #/AREA URNS HPF: ABNORMAL /HPF
BACTERIA SPEC CULT: NORMAL
BILIRUB UR QL STRIP: NEGATIVE
BUN SERPL-MCNC: 105 MG/DL (ref 7–30)
CALCIUM SERPL-MCNC: 7.5 MG/DL (ref 8.5–10.1)
CHLORIDE SERPL-SCNC: 121 MMOL/L (ref 94–109)
CO2 SERPL-SCNC: 14 MMOL/L (ref 20–32)
COLOR UR AUTO: YELLOW
CREAT SERPL-MCNC: 4.19 MG/DL (ref 0.52–1.04)
GFR SERPL CREATININE-BSD FRML MDRD: 10 ML/MIN/1.7M2
GLUCOSE BLDC GLUCOMTR-MCNC: 124 MG/DL (ref 70–99)
GLUCOSE BLDC GLUCOMTR-MCNC: 127 MG/DL (ref 70–99)
GLUCOSE BLDC GLUCOMTR-MCNC: 167 MG/DL (ref 70–99)
GLUCOSE BLDC GLUCOMTR-MCNC: 246 MG/DL (ref 70–99)
GLUCOSE BLDC GLUCOMTR-MCNC: 62 MG/DL (ref 70–99)
GLUCOSE BLDC GLUCOMTR-MCNC: 79 MG/DL (ref 70–99)
GLUCOSE SERPL-MCNC: 57 MG/DL (ref 70–99)
GLUCOSE UR STRIP-MCNC: NEGATIVE MG/DL
HBA1C MFR BLD: 7.3 % (ref 4.3–6)
HGB UR QL STRIP: ABNORMAL
INR PPP: 2.49 (ref 0.86–1.14)
KETONES UR STRIP-MCNC: NEGATIVE MG/DL
LEUKOCYTE ESTERASE UR QL STRIP: ABNORMAL
MICRO REPORT STATUS: NORMAL
MUCOUS THREADS #/AREA URNS LPF: PRESENT /LPF
NITRATE UR QL: NEGATIVE
PH UR STRIP: 5 PH (ref 5–7)
POTASSIUM SERPL-SCNC: 4.1 MMOL/L (ref 3.4–5.3)
RBC #/AREA URNS AUTO: 4 /HPF (ref 0–2)
SODIUM SERPL-SCNC: 148 MMOL/L (ref 133–144)
SP GR UR STRIP: 1.01 (ref 1–1.03)
SPECIMEN SOURCE: NORMAL
SQUAMOUS #/AREA URNS AUTO: 2 /HPF (ref 0–1)
URN SPEC COLLECT METH UR: ABNORMAL
UROBILINOGEN UR STRIP-MCNC: NORMAL MG/DL (ref 0–2)
WBC #/AREA URNS AUTO: 136 /HPF (ref 0–2)

## 2017-06-22 PROCEDURE — 83036 HEMOGLOBIN GLYCOSYLATED A1C: CPT | Performed by: STUDENT IN AN ORGANIZED HEALTH CARE EDUCATION/TRAINING PROGRAM

## 2017-06-22 PROCEDURE — 40000275 ZZH STATISTIC RCP TIME EA 10 MIN: Performed by: OPTOMETRIST

## 2017-06-22 PROCEDURE — A9270 NON-COVERED ITEM OR SERVICE: HCPCS | Mod: GY | Performed by: ORTHOPAEDIC SURGERY

## 2017-06-22 PROCEDURE — 36415 COLL VENOUS BLD VENIPUNCTURE: CPT | Performed by: ORTHOPAEDIC SURGERY

## 2017-06-22 PROCEDURE — 87086 URINE CULTURE/COLONY COUNT: CPT | Performed by: INTERNAL MEDICINE

## 2017-06-22 PROCEDURE — A9270 NON-COVERED ITEM OR SERVICE: HCPCS | Mod: GY | Performed by: STUDENT IN AN ORGANIZED HEALTH CARE EDUCATION/TRAINING PROGRAM

## 2017-06-22 PROCEDURE — 00000146 ZZHCL STATISTIC GLUCOSE BY METER IP

## 2017-06-22 PROCEDURE — G0378 HOSPITAL OBSERVATION PER HR: HCPCS

## 2017-06-22 PROCEDURE — 87088 URINE BACTERIA CULTURE: CPT | Performed by: INTERNAL MEDICINE

## 2017-06-22 PROCEDURE — 25000132 ZZH RX MED GY IP 250 OP 250 PS 637: Mod: GY | Performed by: STUDENT IN AN ORGANIZED HEALTH CARE EDUCATION/TRAINING PROGRAM

## 2017-06-22 PROCEDURE — 94660 CPAP INITIATION&MGMT: CPT | Performed by: OPTOMETRIST

## 2017-06-22 PROCEDURE — 85610 PROTHROMBIN TIME: CPT | Performed by: ORTHOPAEDIC SURGERY

## 2017-06-22 PROCEDURE — 25000128 H RX IP 250 OP 636: Performed by: STUDENT IN AN ORGANIZED HEALTH CARE EDUCATION/TRAINING PROGRAM

## 2017-06-22 PROCEDURE — 36415 COLL VENOUS BLD VENIPUNCTURE: CPT | Performed by: STUDENT IN AN ORGANIZED HEALTH CARE EDUCATION/TRAINING PROGRAM

## 2017-06-22 PROCEDURE — 87186 SC STD MICRODIL/AGAR DIL: CPT | Performed by: INTERNAL MEDICINE

## 2017-06-22 PROCEDURE — 25000132 ZZH RX MED GY IP 250 OP 250 PS 637: Mod: GY | Performed by: ORTHOPAEDIC SURGERY

## 2017-06-22 PROCEDURE — 80048 BASIC METABOLIC PNL TOTAL CA: CPT | Performed by: STUDENT IN AN ORGANIZED HEALTH CARE EDUCATION/TRAINING PROGRAM

## 2017-06-22 PROCEDURE — 81001 URINALYSIS AUTO W/SCOPE: CPT | Performed by: STUDENT IN AN ORGANIZED HEALTH CARE EDUCATION/TRAINING PROGRAM

## 2017-06-22 PROCEDURE — 99233 SBSQ HOSP IP/OBS HIGH 50: CPT | Performed by: ORTHOPAEDIC SURGERY

## 2017-06-22 PROCEDURE — 12000001 ZZH R&B MED SURG/OB UMMC

## 2017-06-22 RX ORDER — PRAMIPEXOLE DIHYDROCHLORIDE 0.25 MG/1
1 TABLET ORAL AT BEDTIME
Status: DISCONTINUED | OUTPATIENT
Start: 2017-06-22 | End: 2017-06-25 | Stop reason: HOSPADM

## 2017-06-22 RX ORDER — WARFARIN SODIUM 5 MG/1
5 TABLET ORAL
Status: COMPLETED | OUTPATIENT
Start: 2017-06-22 | End: 2017-06-22

## 2017-06-22 RX ORDER — PRAMIPEXOLE DIHYDROCHLORIDE 1.5 MG/1
1.5 TABLET ORAL AT BEDTIME
Status: DISCONTINUED | OUTPATIENT
Start: 2017-06-22 | End: 2017-06-22

## 2017-06-22 RX ORDER — SODIUM BICARBONATE 650 MG/1
650 TABLET ORAL 2 TIMES DAILY
Status: DISCONTINUED | OUTPATIENT
Start: 2017-06-22 | End: 2017-06-25

## 2017-06-22 RX ADMIN — PRAVASTATIN SODIUM 40 MG: 40 TABLET ORAL at 09:28

## 2017-06-22 RX ADMIN — SODIUM BICARBONATE 650 MG TABLET 650 MG: at 19:56

## 2017-06-22 RX ADMIN — PRAMIPEXOLE DIHYDROCHLORIDE 1 MG: 0.25 TABLET ORAL at 23:22

## 2017-06-22 RX ADMIN — DEXTROSE MONOHYDRATE 1000 ML: 50 INJECTION, SOLUTION INTRAVENOUS at 12:46

## 2017-06-22 RX ADMIN — SODIUM BICARBONATE 650 MG TABLET 650 MG: at 14:49

## 2017-06-22 RX ADMIN — ISOSORBIDE MONONITRATE 30 MG: 30 TABLET, EXTENDED RELEASE ORAL at 09:28

## 2017-06-22 RX ADMIN — METOPROLOL TARTRATE 50 MG: 50 TABLET, FILM COATED ORAL at 09:28

## 2017-06-22 RX ADMIN — INSULIN ASPART 2 UNITS: 100 INJECTION, SOLUTION INTRAVENOUS; SUBCUTANEOUS at 18:28

## 2017-06-22 RX ADMIN — METOPROLOL TARTRATE 50 MG: 50 TABLET, FILM COATED ORAL at 19:56

## 2017-06-22 RX ADMIN — WARFARIN SODIUM 5 MG: 5 TABLET ORAL at 18:28

## 2017-06-22 NOTE — PROGRESS NOTES
[facility] Internal Medicine Progress Note         Interval History:   No acute events overnight. Patient still without complaints. Made a loose BM this AM, although she states this BM is more well-formed than her baseline chronic diarrhea has been. She reports she has not been drinking much water PO but is trying to drink more water today.    Nursing Notes Reviewed, 4 point review of systems negative unless otherwise noted above.     Interval Changes:  -- per nephrology:         - Fluid replacement, D5 @ 125 mL/hr        - bicarb replacement, Sodium bicarbonate 650 mg PO BID             Medications:     Current Facility-Administered Medications   Medication     sodium chloride 0.45% BOLUS     pravastatin (PRAVACHOL) tablet 40 mg     metoprolol (LOPRESSOR) tablet 50 mg     isosorbide mononitrate (IMDUR) 24 hr tablet 30 mg     naloxone (NARCAN) injection 0.1-0.4 mg     lidocaine 1 % 1 mL     lidocaine (LMX4) kit     sodium chloride (PF) 0.9% PF flush 3 mL     sodium chloride (PF) 0.9% PF flush 3 mL     acetaminophen (TYLENOL) tablet 650 mg     senna-docusate (SENOKOT-S;PERICOLACE) 8.6-50 MG per tablet 1-2 tablet     ondansetron (ZOFRAN-ODT) ODT tab 4 mg    Or     ondansetron (ZOFRAN) injection 4 mg     Warfarin Therapy Reminder (Check START DATE - warfarin may be starting in the FUTURE)     glucose 40 % gel 15-30 g    Or     dextrose 50 % injection 25-50 mL    Or     glucagon injection 1 mg     insulin aspart (NovoLOG) inj (RAPID ACTING)     insulin aspart (NovoLOG) inj (RAPID ACTING)              OBJECTIVE:     EXAM  Vitals were reviewed  Blood pressure 128/68, pulse 89, temperature 98.8  F (37.1  C), temperature source Oral, resp. rate 18, weight 75.9 kg (167 lb 4.8 oz), SpO2 94 %.  GEN: Alert and oriented,sitting comfortably in chair, cooperative, in no acute distress  HEENT: Normocephalic, atraumatic. EOMI, no scleral icterus.  RESP: CTAB - no wheezes/rales/rhonchi, no increased work of breathing on room  air  CV: RRR - S1 and S2 without m/r/g   ABD: normoactive BS, soft, obese, nontender to palpation  EXT: Warm and well perfused, BLE nonpitting edema, b/l pedal pulses present  NEURO: CN II-XII grossly intact, no gross deficits appreciated.   SKIN: dark, erythematous stasis dermatitis of BLE distal to tibial plateaus, several well-healing small scabs of BLE, no other obvious skin lesions.    LABS/IMAGING  Laboratory Results:  Urea nitrogen random urine   Result Value Ref Range    Urea Nitrogen, Ur 346 mg/dL    Urea Nitrogen Urine g/g Cr 12 g/g Cr   Fractional excretion of sodium   Result Value Ref Range    Creatinine Urine 28 mg/dL    Sodium Urine mmol/L 86 mmol/L    %FENA 8.9 %   Glucose by meter   Result Value Ref Range    Glucose 141 (H) 70 - 99 mg/dL   CBC with platelets   Result Value Ref Range    WBC 6.0 4.0 - 11.0 10e9/L    RBC Count 3.83 3.8 - 5.2 10e12/L    Hemoglobin 9.0 (L) 11.7 - 15.7 g/dL    Hematocrit 29.7 (L) 35.0 - 47.0 %    MCV 78 78 - 100 fl    MCH 23.5 (L) 26.5 - 33.0 pg    MCHC 30.3 (L) 31.5 - 36.5 g/dL    RDW 16.8 (H) 10.0 - 15.0 %    Platelet Count 254 150 - 450 10e9/L   Creatinine serum   Result Value Ref Range    Creatinine 4.17 (H) 0.52 - 1.04 mg/dL   Sodium   Result Value Ref Range    Sodium 145 (H) 133 - 144 mmol/L   INR   Result Value Ref Range    INR 2.40 (H) 0.86 - 1.14   Clostridium difficile toxin B PCR   Result Value Ref Range    Specimen Description Feces     C Diff Toxin B PCR  NEG     Negative  Negative: Clostridium difficile target DNA sequences NOT detected, presumed   negative for Clostridium difficile toxin B or the number of bacteria present   may be below the limit of detection for the test.   FDA approved assay performed using Pog[employer] GeneXpert real-time PCR.   A negative result does not exclude actual disease due to Clostridium difficile   and may be due to improper collection, handling and storage of the specimen or   the number of organisms in the specimen is below the  detection limit of the   assay.     UA with Microscopic reflex to Culture   Result Value Ref Range    Color Urine Yellow     Appearance Urine Slightly Cloudy     Glucose Urine Negative NEG mg/dL    Bilirubin Urine Negative NEG    Ketones Urine Negative NEG mg/dL    Specific Gravity Urine 1.010 1.003 - 1.035    Blood Urine Trace (A) NEG    pH Urine 5.0 5.0 - 7.0 pH    Protein Albumin Urine 10 (A) NEG mg/dL    Urobilinogen mg/dL Normal 0.0 - 2.0 mg/dL    Nitrite Urine Negative NEG    Leukocyte Esterase Urine Large (A) NEG    Source Urine     WBC Urine 136 (H) 0 - 2 /HPF    RBC Urine 4 (H) 0 - 2 /HPF    Bacteria Urine Few (A) NEG /HPF    Squamous Epithelial /HPF Urine 2 (H) 0 - 1 /HPF    Mucous Urine Present (A) NEG /LPF   Basic metabolic panel   Result Value Ref Range    Sodium 148 (H) 133 - 144 mmol/L    Potassium 4.1 3.4 - 5.3 mmol/L    Chloride 121 (H) 94 - 109 mmol/L    Carbon Dioxide 14 (L) 20 - 32 mmol/L    Anion Gap 13 3 - 14 mmol/L    Glucose 57 (L) 70 - 99 mg/dL    Urea Nitrogen 105 (H) 7 - 30 mg/dL    Creatinine 4.19 (H) 0.52 - 1.04 mg/dL    GFR Estimate 10 (L) >60 mL/min/1.7m2    GFR Estimate If Black 13 (L) >60 mL/min/1.7m2    Calcium 7.5 (L) 8.5 - 10.1 mg/dL   Hemoglobin A1c   Result Value Ref Range    Hemoglobin A1C 7.3 (H) 4.3 - 6.0 %              Assessment and Plan:   Kathryn Banks is a 77 year old female with a PMHx significant for CKD stage IV, multiple cardiovascular diseases including CAD s/p 5 vessel CABG (2006), HFpEF, HTN, A fib, and DMT2 controlled by diet, ANABEL on CPAP, admitted for progressive renal dysfunction found on labs in outpatient nephrology clinic.     # MARTÍN on CKD but possible just progression of underlying CKD, now stage 4  Creatinine gradually creeping up since beginning of year. Was 1.26 in January and gradually elevated since then; 2.29 on OSH admission on 5/31, 2.49 on second admission on 6/12, jumped to 4.03 by discharge on 6/16, 4.26 in nephrology clinic AM before this  admission (6/21). Not likely prerenal: no evidence of hemorrhage or increased diarrhea, has a history of HFpEF with EF 60-65% on last echo 5/31. Unlikely postrenal: US renal on 5/31 with no evidence of stone, mass, or hydronephrosis, no evidence of outlet obstruction. Likely intrarenal as FENa on 5/31 5.7 % and on 6/12 14.3 %, she reports she only uses NSAIDs rarely. She is type 2 diabetic, however blood sugars have been well controlled with diet only. She has in past 2 months been admitted for sepsis 2/2 klebsiella UTI, then found to have UTI again on 6/12 admission. Possible MARTÍN 2/2 complicated UTI, however cannot rule out other glomerulonephritic causes. Suspect may be ATN 2/2 aggressive diuresis in recent hospitalizations, Cr has been gradually increasing for several months now, but with dramatic increase during last hospitalization (2.49 --> 4.03). FEUrea calculated 6/22 is 49.3%, suggestive of intrinsic renal disorder.  -- nephrology consult, appreciate recs. Per nephrology, fluid replacement with D5, and bicarb replacement   - D5 IVF @ 125 mL/hr   - Sodium bicarbonate 650 mg PO BID  -- urine culture, pending     # Recent history of anasarca  # HFpEF, EF 60-65%  # Atrial flutter/fibrillation  Admission to OSH on 5/31 with BLE edema extending through part of trunk, had 30-40 pound weight gain in weeks leading up to hospitalization. Cardiac workup revealed no evidence of CHF exacerbation, edema was attributed to MARTÍN found at that time.  -- continue PTA metoprolol  -- continue PTA warfarin  -- continue PTA pravastatin     # CAD, s/p quintuple bypass (2006)  # Atrial flutter, s/p ablation  # Hypertension  -- continue PTA imdur     # Diabetes mellitus, type 2  Diet controlled PTA. Monitor, sliding scale as needed     Diet: Regular Diet Adult  Fluids: D5 IVF @ 125 mL/hr  DVT Prophylaxis: Low Risk/Ambulatory with no VTE prophylaxis indicated  Code Status: Full Code  Dispo: Likely d/c back to TCU in 1-2 days pending  nephrology workup.      Patient seen and discussed with Dr. Pinedo, PGY-3, and Dr. Sheikh, Hospitalist attending physician.    Lizette Ballesteros, MS4    Attending addendum:  A minimum of 4 point ROS is negative except as mentioned in the subjective section of the note.     Labs and VS reviewed    /70  Pulse 73  Temp 98.2  F (36.8  C) (Oral)  Resp 18  Wt 76.3 kg (168 lb 4.8 oz)  SpO2 96%  BMI 28.89 kg/m2  Gen: Well appearing, sitting in the bedside chair  HEENT: MMM without oral lesions. No sceral icterus  Pulm: Clear  CV: RRR without m/r/g  Abn: Soft and NT, + BS  Extrem:LE non-itting edema with rash on the anterior shins     I saw and evaluated this patient with MS4 Lizette. I agree with his/her assessment of Kathryn. Please see my edits above in blue as well as separate A/P below    Kathryn is a 76 yo woman admitted with MARTÍN on CKD.     1. MARTÍN on CKD with non-anion gap metabolic acidosis:  Hypernatremia:    Etiology of the underlying renal disease is unclear. Does have HTN and DM. Protein at 0.89 gms daily.  Nephrology recommending recommending trial of IVF and starting oral bicarb. Will follow weights and UOP carefully. Etiology of MARTÍN would remain unclear    2. Chronic diastolic heart failure:  Currently well compensated. Frac BUN not suggestive of intravascularly volume depletion. Diuertics held overnight. Renal recommending trial of IVF, will need to watchj fluid status and resp status carefully.       3. Atrial flutter s/p aplation:  Currently well controlled and in sinus rhythm. Continues on warfarin. Continues on metop    4. HTN:   Well controlled, continue imdur    5. Abnormal U/A:  ++ LE and WBC. Favoring asympt bacteria, will follow culture       Stewart Sheikh MD

## 2017-06-22 NOTE — PROGRESS NOTES
Social Work Services Progress Note    Hospital Day: 1  Collaborated with:  RNCC, Pt's , Christian, via phone ([phone]), [facility] ([phone])    Data:  Automatic SW consult as pt was residing at [facility], prior to admission. Writer contacted facility and was informed that pt did not have a bed hold at facility and that new referral would need to be initiated. Writer attempted to meet w/ pt, but she was sleeping. Writer contacted  and he confirmed that they had not wanted to hold pt's bed at TCU. Writer explained that writer will need to make another referral and there is a possibility pt may not be able to return due to no bed available.  indicated that another referral should be made as this is closest facility to their home. Next preference for placement would be in the [facility].     Intervention:  D/C Planning    Assessment:  SW unable to meet w/ pt as she was sleeping.     Plan:    Anticipated Disposition:  Anticipate pt will have TCU needs either at [facility], or another facility. [facility] requesting referral be faxed closer to d/c.     Barriers to d/c plan:  Bed availability at preferred facility    Follow Up:  SW will f/u with [facility], preferred facility, in the AM.

## 2017-06-22 NOTE — PLAN OF CARE
Problem: Discharge Planning  Goal: Discharge Planning (Adult, OB, Behavioral, Peds)  -diagnostic tests and consults completed and resulted : No  -vital signs normal or at patient baseline : Yes  - creatinine is improving : No     Nephrology team consulted; additional orders: daily wt, fluids, and ortho BPs; changing pt to in-pt status.

## 2017-06-22 NOTE — PLAN OF CARE
Problem: Discharge Planning  Goal: Discharge Planning (Adult, OB, Behavioral, Peds)  -diagnostic tests and consults completed and resulted : yes  -vital signs normal or at patient baseline : Yes  - creatinine is improving : No     Pt a/o x 4: VSS; pt IV access slipped out, removed IV and requested new IV to be established.

## 2017-06-22 NOTE — PROGRESS NOTES
SPIRITUAL HEALTH SERVICES  SPIRITUAL ASSESSMENT Progress Note  [facility] ([place] 6D     REFERRAL SOURCE: Request from On-Call     Kathryn desired communion, per the on-call 's request I was able to distribute the Eucharist to Kathryn.    PLAN: Will notify unit .    Leonel Monique   Intern  Pager [phone]

## 2017-06-22 NOTE — PLAN OF CARE
Problem: Goal Outcome Summary  Goal: Goal Outcome Summary  Outcome: No Change  Observation Goals:  1. Diagnostic tests and consults completed: No  2. Vital signs at baseline: Yes  3. Creatinine improving: No Last Cr level = 4.12  The pt is up to the commode chair with SBA of 1. C/o generalized weakness. Uses walker to ambulate distances. Alert and oriented x 4. No c/o pain. Ate and tolerated a regular diet. No c/o nausea. Having large, loose, tan stools. Cx for C Diff sent and is pending. Edema in bilat calves. Skin over calves is red/kailash and taut. Has several small scabs on right shin. Wound on left shin covered with [place] dsg. No drainage present.  P: Goals not met. Continue POC.

## 2017-06-22 NOTE — CONSULTS
Nephrology Initial Consult  June 22, 2017      Kathryn Banks MRN:[id number] YOB: 1940  Date of Admission:6/21/2017  Primary care provider: Dheeraj Jj  Requesting physician: Erica Brown,*    ASSESSMENT AND RECOMMENDATIONS: Kathryn Banks is a 77 year old female with a past medical history significant for CKD stage IV secondary to Type II DM, CAD, HFpEF and A Fib with MARTÍN that is likely multifactorial due to cardiorenal syndrome, pre renal from overdiuresis, renal damage from UTI and hemodynamic variations in the setting of A Fib with RVR.  She is admitted for worsening of creatinine thought to be pre renal in etiology due to diarrhea.    1. MARTÍN on CKD--She has stage IV CKD at baseline due to type II DM but developed MARTÍN during her recent hospitalizations that is multifactorial due to cardiorenal syndrome, overdiuresis,  UTI and hemodyanmic variations due to A Fib with RVR.  Her creatinine has failed to improve from this MARTÍN.  She now presents with increasing creatinine in the setting of poor oral fluid intake, diarrhea and ongoing diuretic use.  Her elevation in creatinine now is likely pre renal in etiology due to volume depletion.  She is currently receiving D5W at 125 ml/hr.  Would prefer some sodium containing fluid for gentle volume expansion.  Consider D5 1/2 NS at 125 ml/hour with careful monitoring of respiratory status.  This would give the equivalent of saline 60/hour with total of 750 ml in 12 hours which would be unlikely to cause pulmonary edema.    Continue to monitor clinically for signs and symptoms of volume overload given her history of CHF.  Stop fluids when she is euvolemic.    2. Anion gap metabolic acidosis--  Blood gas not done since 6/3/17 but then she had metabolic acidosis. Likely due to impaired ammoniagenesis.   Bicarb deficit is nearly 400 meq. Recommend starting sodium bicarbonate @ 650 mg po BID, this will replace 16 meq bicarb daily.   3. Volume  status--Hypovolemic at this time.  Consider sodium containing fluids as above. Monitor for signs of euvolemia.  4. Electrolytes--Slightly hypernatremic at 148.  Likely due to GI and urine losses (loop diuretic).  Water deficit is 2.6 liters, in the absence of ongoing water losses, with current IV fluids, she will correct in 24 hours.  Most people have 500-700 ml insensible losses per day.  Continue to monitor with IV water.  5. Anemia of CKD--Hgb of 9.0.  Stable, iron sat is below goal of >30 and ferritin not high.  Would add ferrous sulfate 350 mg bid.  If hemoglobin does not improve with iron repletion then she will need SUELLEN.  6. Hypertension--normotensive at this time on metoprolol. Holding diuretics    Recommendations  1. Consider D5 1/2 /hour  2. Monitor for signs of volume overload  3. Stop fluids when patient is euvolemic  4. Sodium bicarbonate 650 mg po BID  5. Daily weights and BMPs  6. Monitor I&Os    Seen and discussed with Dr. Cain. I am acting as a scribe for Dr. Cain on this note.    Gerardo Carl MS IV  [phone]      REASON FOR CONSULT: MARTÍN on CKD    HISTORY OF PRESENT ILLNESS:  Kathryn Banks is a 77 year old with a PMH significant for CKD stage IV secondary to Type 2 DM, CAD s/p 5 vessel CABG in 2006, HFpEF, HTN, A Fib and a recent hospitalization for sepsis secondary to Klebsiella UTI in late April.  She was also had two recent hospitalizations for anasarca and found to have MARTÍN during these hospitalizations.  She was admitted on 5/31 for progressive swelling of BLE and trunk with a 30-40 pound weight gain over 2-3 weeks.  She did develop shortness of breath for a few days before that admission. On admission on 5/31, her creatinine was 2.29.  She was diuresed and subsequently developed MARTÍN.  She regained 12 pounds of weight within a week of discharge and readmitted from 6/12-6/17 with MARTÍN suspected to be due to ischemic intrarenal MARTÍN secondary to overdiuresis and cardiorenal  syndrome. On 6/12 her creatinine was 2.49.  At this time her FENa was 14.3, Urine protein to creatinine ratio was 1.3.  She was diuresed with lasix and metalozone.  Was discharged on Bumex and prn Metolazone.  On discharged her creatinine was 4.03.  She was discharged to a TCU and followed up in the Nephrology clinic yesterday and creatinine was found to be 4.26.  She has had loose stools for the last few days with 2-3 stools a day.  She reports poor water intake at home but says appetite has been okay. She denies chest pain or shortness of breath.  She denies increased swelling in her legs.  PAST MEDICAL HISTORY:  Reviewed with patient on 06/22/2017     Past Medical History:   Diagnosis Date     Carpal tunnel syndrome      Coronary atherosclerosis of native coronary artery 2006    5 vessel CABG     OBSTRUCTIVE SLEEP APNEA     using CPAP     Osteoarthrosis, unspecified whether generalized or localized, unspecified site     generalized arthritis, particularly in her hands and feet         Other and combined forms of senile cataract 2000    Bilateral     Other and unspecified hyperlipidemia      RESTLESS LEGS SYNDROME      TENOSYNOV HAND/WRIST NEC 6/30/2006     Type II or unspecified type diabetes mellitus without mention of complication, not stated as uncontrolled 2001    Diabetes mellitus/pt is diet controlled with weight loss     Typical atrial flutter (H) 01/17/2007     Unspecified essential hypertension        Past Surgical History:   Procedure Laterality Date     ANGIOPLASTY       C APPENDECTOMY       C CABG, VEIN, FIVE  12/06    SVG x 4 and LIMA to LAD     C SOTO W/O FACETEC FORAMOT/DSKC 1/2 VRT SEG, LUMBAR  1968     C REMV CATARACT INTRACAP,INSERT LENS      Bilateral     C TOTAL ABDOM HYSTERECTOMY  1995     - fibroids     C VAGOTOMY/PYLOROPLASTY,SELECT  1970     COLONOSCOPY  12/3/2007     COLONOSCOPY  1/17/2014    Procedure: COLONOSCOPY;  Colonoscopy;  Surgeon: Zack Stearns MD;  Location:  GI      CYSTOSCOPY  11/25/09    [facility]     ENDOSCOPY  03/21/2000    Upper GI     HC COLONOSCOPY THRU STOMA, DIAGNOSTIC  10/7/04    poor prep, repeat in 2-3 years     HC COLONOSCOPY W/WO BRUSH/WASH  10/31/07    Repeat-poor quality prep     HC REMOVAL OF OVARY/TUBE(S)      Salpingo-Oophorectomy, Unilateral     HC REVISE MEDIAN N/CARPAL TUNNEL SURG      Carpal tunnel release     HC UGI ENDOSCOPY DIAG W BIOPSY  10/31/07     HC UGI ENDOSCOPY, SIMPLE EXAM  12/3/2007     OPEN REDUCTION INTERNAL FIXATION HIP NAILING Left 7/3/2016    Procedure: OPEN REDUCTION INTERNAL FIXATION HIP NAILING;  Surgeon: Lake Schulz MD;  Location:  OR        MEDICATIONS:  PTA Meds  Prior to Admission medications    Medication Sig Last Dose Taking? Auth Provider   warfarin (COUMADIN) 2.5 MG tablet Take 1 tablet (2.5 mg) daily until INR is rechecked within 3 days  Patient taking differently: 6/19 INR 3.1 Give 1 mg daily next INR 6/26 6/20/2017 at Unknown time Yes Arash Silva MD   bumetanide (BUMEX) 2 MG tablet Take 1 tablet (2 mg) by mouth daily 6/20/2017 at Unknown time Yes Arash Silva MD   metolazone (ZAROXOLYN) 5 MG tablet Take 1 tablet (5 mg) by mouth daily as needed For weight gain more than 2 pounds in 1 day or 5 pounds in 1 week 6/20/2017 at Unknown time Yes Arash Silva MD   isosorbide mononitrate (IMDUR) 30 MG 24 hr tablet Take 1 tablet (30 mg) by mouth daily 6/20/2017 at Unknown time Yes Arash Silva MD   gabapentin (NEURONTIN) 300 MG capsule Take 1 capsule (300 mg) by mouth At Bedtime 6/20/2017 at Unknown time Yes Arash Silva MD   pravastatin (PRAVACHOL) 40 MG tablet Take 1 tablet (40 mg) by mouth daily 6/20/2017 at Unknown time Yes Arash Silva MD   pramipexole (MIRAPEX) 0.5 MG tablet Take 3 tablets (1.5 mg) by mouth every evening 6/20/2017 at Unknown time Yes Arash Silva MD   metoprolol (LOPRESSOR) 25 MG tablet Take 2 tablets (50 mg) by mouth 2 times daily 6/21/2017 at Unknown time Yes Ricardo,  Arash GROVES MD   ferrous sulfate (IRON) 325 (65 FE) MG tablet Take 1 tablet (325 mg) by mouth daily (with breakfast) 6/21/2017 at Unknown time Yes Arash Silva MD   calcium carbonate-vitamin D 500-400 MG-UNIT TABS per tablet Take 1 tablet by mouth daily 6/21/2017 at Unknown time Yes Arash Silva MD   cyanocobalamin (VITAMIN B12) 1000 MCG/ML injection Inject 1 mL (1,000 mcg) into the muscle every 30 days Past Month at Unknown time Yes Dheeraj Jj MD   acetaminophen (TYLENOL) 325 MG tablet Take 2 tablets (650 mg) by mouth every 4 hours as needed for mild pain More than a month at Unknown time  Arash Silva MD   nitroglycerin (NITROSTAT) 0.4 MG sublingual tablet Place 1 tablet (0.4 mg) under the tongue every 5 minutes as needed for chest pain Unknown at Unknown time  Arash Silva MD   ORDER FOR DME Equipment being ordered: cpap machine, mask, humidifier, tubing and filters Unknown at Unknown time  Dheeraj Jj MD      Current Meds    sodium chloride 0.45%  500 mL Intravenous Once     pravastatin  40 mg Oral Daily     metoprolol  50 mg Oral BID     isosorbide mononitrate  30 mg Oral Daily     sodium chloride (PF)  3 mL Intracatheter Q8H     insulin aspart  1-3 Units Subcutaneous TID AC     insulin aspart  1-3 Units Subcutaneous At Bedtime     Infusion Meds    Warfarin Therapy Reminder         ALLERGIES:    Allergies   Allergen Reactions     Ciprofloxacin Nausea and Vomiting     Zofran did not help     Oxycodone Visual Disturbance     Delusions, blackouts      Lisinopril Cough     Penicillins Rash     Sulfa Drugs GI Disturbance     LOSS OF TASTE       REVIEW OF SYSTEMS:  A 10 point review of systems was negative except as noted above.    SOCIAL HISTORY:   Social History     Social History     Marital status:      Spouse name: Urbano Banks     Number of children: 2     Years of education: 13     Occupational History     Ministry coordinator      [employer]      Social History Main Topics     Smoking status: Former Smoker     Years: 10.00     Quit date: 1969     Smokeless tobacco: Never Used     Alcohol use 0.0 oz/week     0 Standard drinks or equivalent per week      Comment: occasional- 2 drinks per month     Drug use: No     Sexual activity: Yes     Birth control/ protection: Surgical     Other Topics Concern     Parent/Sibling W/ Cabg, Mi Or Angioplasty Before 65f 55m? No     Social History Narrative     FAMILY MEDICAL HISTORY:   Family History   Problem Relation Age of Onset     DIABETES Father      AODM     Neurologic Disorder Father      Parkinson's     Blood Disease Father       from blood clot from leg to lung immediately after hip fracture     DIABETES Paternal Grandmother      adult onset     DIABETES Maternal Grandmother      adult onset     Hypertension No family hx of      CEREBROVASCULAR DISEASE No family hx of    reviewed    PHYSICAL EXAM:   Temp  Av.5  F (36.4  C)  Min: 95  F (35  C)  Max: 99.6  F (37.6  C)      Pulse  Av.9  Min: 58  Max: 144 Resp  Av.1  Min: 9  Max: 39  SpO2  Av.7 %  Min: 86 %  Max: 100 %       /68  Pulse 89  Temp 98.8  F (37.1  C) (Oral)  Resp 18  Wt 76.3 kg (168 lb 4.8 oz)  SpO2 94%  BMI 28.89 kg/m2   Date 17 0700 - 17 0659   Shift 3077-2104 6509-0159 8253-6668 24 Hour Total   I  N  T  A  K  E   Shift Total  (mL/kg)       O  U  T  P  U  T   Urine 300   300    Shift Total  (mL/kg) 300  (3.93)   300  (3.93)   Weight (kg) 76.34 76.34 76.34 76.34        Admit Weight: 75.9 kg (167 lb 4.8 oz)     GENERAL APPEARANCE: no acute distress, awake and appropriate  EYES: no scleral icterus, pupils equal  HENT: NC/AT,  mouth  without ulcers or lesions  Lymphatics: no cervical or supraclavicular LAD  Pulmonary: lungs clear to auscultation with equal breath sounds bilaterally, no clubbing  CV: regular rhythm, normal rate, no rub   - JVP: not seen   - Edema: trace in lower extremities  GI: soft,  nontender, normal bowel sounds, no HSM   MS: no evidence of inflammation in joints, no muscle tenderness  : no Cee  SKIN:  warm, dry, no cyanosis, flaky skin and changes of chronic dermatitis  NEURO: mentation intact and speech normal    LABS:   CMP  Recent Labs  Lab 06/22/17  0658 06/21/17  1536 06/21/17  0842 06/16/17  0615   * 145* 146* 143   POTASSIUM 4.1  --  4.4 4.4   CHLORIDE 121*  --  113* 107   CO2 14*  --  18* 26   ANIONGAP 13  --  15* 10   GLC 57*  --  140* 124*   *  --  107* 94*   CR 4.19* 4.17* 4.26* 4.03*   GFRESTIMATED 10*  --  10* 11*   GFRESTBLACK 13*  --  12* 13*   MALICK 7.5*  --  7.6* 7.3*   PHOS  --   --  6.0*  --    ALBUMIN  --   --  2.9*  --      CBC  Recent Labs  Lab 06/21/17  1536 06/16/17  0615   HGB 9.0* 9.0*   WBC 6.0  --    RBC 3.83  --    HCT 29.7*  --    MCV 78  --    MCH 23.5*  --    MCHC 30.3*  --    RDW 16.8*  --      --      INR  Recent Labs  Lab 06/21/17  1645 06/16/17  0615   INR 2.40* 3.43*     ABGNo lab results found in last 7 days.   URINE STUDIES  Recent Labs   Lab Test  06/22/17   0119  06/21/17   0850  06/12/17   2050  06/01/17   2045   07/02/16   0224  02/05/13   1016  01/31/13   1210  01/22/13   0921   COLOR  Yellow  Light Yellow  Colorless  Red   < >  Yellow  Yellow  Yellow  Yellow   APPEARANCE  Slightly Cloudy  Slightly Cloudy  Clear  Cloudy   < >  Clear  Clear  Clear  Clear   URINEGLC  Negative  Negative  Negative  Negative   < >  Negative  >=1000*  >=1000*  500*   URINEBILI  Negative  Negative  Negative  Negative   < >  Negative  Negative  Negative  Negative   URINEKETONE  Negative  Negative  Negative  Negative   < >  Negative  Negative  Negative  Negative   SG  1.010  1.009  1.005  1.009   < >  1.020  1.020  1.025  1.020   UBLD  Trace*  Trace*  Moderate*  Moderate*   < >  Trace*  Negative  Negative  Small*   URINEPH  5.0  5.0  5.0  5.0   < >  6.0  6.0  6.0  6.0   PROTEIN  10*  10*  Negative  100*   < >  Negative  Trace*  Trace*  30*    UROBILINOGEN   --    --    --    --    --   0.2  0.2  0.2  0.2   NITRITE  Negative  Negative  Negative  Negative   < >  Negative  Positive*  Positive*  Negative   LEUKEST  Large*  Large*  Negative  Small*   < >  Negative  Small*  Trace*  Moderate*   RBCU  4*  2-5*  26*  >182*   < >  2-5*  O - 2  2-5*  10-25*   WBCU  136*  *  3*  >182*   < >  O - 2  *  *  >100*    < > = values in this interval not displayed.     Recent Labs   Lab Test  06/21/17   0848  06/14/17   1455  06/01/17   2045   UTPG  0.89*  1.31*  2.11*     PTH  Recent Labs   Lab Test  06/21/17   0842   PTHI  567*     IRON STUDIES  Recent Labs   Lab Test  06/21/17   0842  06/14/17   1750  08/20/12   1146  06/07/12   1350   IRON  36  26*  32*  15*   FEB  209*  229*  285  323   IRONSAT  17  11*  11*  5*   GWEN  196  87   --   7*       IMAGING:  All imaging studies reviewed by me.     Gerardo Carl    I agree with the PFSH and ROS as completed by the MS.  The remainder of the encounter was performed by me and scribed by the MS.  The scribed note accurately reflects my personal services and the decisions made by me.    Emy Cain MD  [facility]  Department of Medicine  Division of Renal Disease and Hypertension  [phone]

## 2017-06-22 NOTE — PROGRESS NOTES
[facility]  Patient is currently open to home care services with [facility].  Before previous hospitalization and transfer to TCU the patient was receiving  RN/HA services.  [facility]  and team have been notified of patient admission.  [facility] liaison will continue to follow patient during stay.  If appropriate provide orders to resume home care at time of discharge.    Kathryn Mcnulty RN, BSN  [facility] Liaison  [phone]

## 2017-06-22 NOTE — PLAN OF CARE
Problem: Discharge Planning  Goal: Discharge Planning (Adult, OB, Behavioral, Peds)  -Diagnostic tests and consults completed and resulted: No   -Vital signs normal or at patient baseline: Yes   -Creatinine is improving: No, Last Creat Level was 4.17

## 2017-06-22 NOTE — PROGRESS NOTES
"SPIRITUAL HEALTH SERVICES  SPIRITUAL ASSESSMENT Progress Note  [facility] ([place] 6D     PRIMARY FOCUS:     Goals of care     Symptom/pain management     Emotional/spiritual/Alevism distress     Support for coping     REFERRAL SOURCE: Consult from unit.    ILLNESS CIRCUMSTANCES:   Reviewed documentation. Reflective conversation shared with Kathryn which integrated elements of illness and family narratives.     Context of Serious Illness/Symptom(s) - Kathryn expressed that her kidney is 80% shut-down and she does not know what this  means for her. She is anxious to hear her medical plan.    Resources for Support - Family support and positive attitude.    DISTRESS:     Emotional/Spiritual/Existential Distress - Concerned about her family if she dies. Worried about how her children and grandchildren will handle her death.  In addition, she is concerned about who will take care of her . Kathryn stated that this is the cause of her depression.    Religion Distress - None discussed.    Social/Cultural/Economic Distress - None discussed.    SPIRITUAL/Mormon COPING:     Taoism/Mary - Kathryn spoke about what death looks like.  She relayed that she would go to [facility] and it would be beyond words because, \"our Savior will be there.\"     Spiritual Practice(s) - I asked her if she practices 'being in the moment' and living 'one day at a time.' She stated that she did not but she will give it a try, \"I will try anything.\"    Emotional/Relational/Existential Connections - Kathryn will attempt to stay in the moment. Her children are extremely supportive.    Patient requested prayer. Shared prayer. Offered rosary.  Pt was very grateful for the rosary.    GOALS OF CARE:    Goals of Care - Attempt to 'be in the moment' and live 'one day at a time.'     Meaning/Sense-Making - Attempt to focus on the positive aspects of her life.    PLAN: Pt asked if I would be able to distribute communion to her. I stated that I cannot; but I " would attempt to find someone who could in the next couple of days. Leonel,  Intern, agreed to distribute communion to Kathryn.    Zo Hernandez   Intern  Pager [phone]

## 2017-06-23 ENCOUNTER — APPOINTMENT (OUTPATIENT)
Dept: PHYSICAL THERAPY | Facility: CLINIC | Age: 77
DRG: 683 | End: 2017-06-23
Attending: INTERNAL MEDICINE
Payer: MEDICARE

## 2017-06-23 LAB
ANION GAP SERPL CALCULATED.3IONS-SCNC: 10 MMOL/L (ref 3–14)
BUN SERPL-MCNC: 91 MG/DL (ref 7–30)
CALCIUM SERPL-MCNC: 7.8 MG/DL (ref 8.5–10.1)
CHLORIDE SERPL-SCNC: 119 MMOL/L (ref 94–109)
CO2 SERPL-SCNC: 16 MMOL/L (ref 20–32)
CREAT SERPL-MCNC: 3.97 MG/DL (ref 0.52–1.04)
GFR SERPL CREATININE-BSD FRML MDRD: 11 ML/MIN/1.7M2
GLUCOSE BLDC GLUCOMTR-MCNC: 100 MG/DL (ref 70–99)
GLUCOSE BLDC GLUCOMTR-MCNC: 109 MG/DL (ref 70–99)
GLUCOSE BLDC GLUCOMTR-MCNC: 184 MG/DL (ref 70–99)
GLUCOSE BLDC GLUCOMTR-MCNC: 186 MG/DL (ref 70–99)
GLUCOSE BLDC GLUCOMTR-MCNC: 244 MG/DL (ref 70–99)
GLUCOSE SERPL-MCNC: 93 MG/DL (ref 70–99)
INTERPRETATION ECG - MUSE: NORMAL
MAGNESIUM SERPL-MCNC: 2 MG/DL (ref 1.6–2.3)
PHOSPHATE SERPL-MCNC: 5.2 MG/DL (ref 2.5–4.5)
POTASSIUM SERPL-SCNC: 4.2 MMOL/L (ref 3.4–5.3)
SODIUM SERPL-SCNC: 145 MMOL/L (ref 133–144)

## 2017-06-23 PROCEDURE — A9270 NON-COVERED ITEM OR SERVICE: HCPCS | Mod: GY | Performed by: ORTHOPAEDIC SURGERY

## 2017-06-23 PROCEDURE — 40000275 ZZH STATISTIC RCP TIME EA 10 MIN

## 2017-06-23 PROCEDURE — 97161 PT EVAL LOW COMPLEX 20 MIN: CPT | Mod: GP

## 2017-06-23 PROCEDURE — 25800025 ZZH RX 258: Performed by: STUDENT IN AN ORGANIZED HEALTH CARE EDUCATION/TRAINING PROGRAM

## 2017-06-23 PROCEDURE — 94660 CPAP INITIATION&MGMT: CPT

## 2017-06-23 PROCEDURE — 84100 ASSAY OF PHOSPHORUS: CPT | Performed by: STUDENT IN AN ORGANIZED HEALTH CARE EDUCATION/TRAINING PROGRAM

## 2017-06-23 PROCEDURE — 12000001 ZZH R&B MED SURG/OB UMMC

## 2017-06-23 PROCEDURE — 25000132 ZZH RX MED GY IP 250 OP 250 PS 637: Mod: GY | Performed by: ORTHOPAEDIC SURGERY

## 2017-06-23 PROCEDURE — 25000132 ZZH RX MED GY IP 250 OP 250 PS 637: Mod: GY | Performed by: STUDENT IN AN ORGANIZED HEALTH CARE EDUCATION/TRAINING PROGRAM

## 2017-06-23 PROCEDURE — S5010 5% DEXTROSE AND 0.45% SALINE: HCPCS | Performed by: STUDENT IN AN ORGANIZED HEALTH CARE EDUCATION/TRAINING PROGRAM

## 2017-06-23 PROCEDURE — A9270 NON-COVERED ITEM OR SERVICE: HCPCS | Mod: GY | Performed by: STUDENT IN AN ORGANIZED HEALTH CARE EDUCATION/TRAINING PROGRAM

## 2017-06-23 PROCEDURE — 97530 THERAPEUTIC ACTIVITIES: CPT | Mod: GP

## 2017-06-23 PROCEDURE — 97116 GAIT TRAINING THERAPY: CPT | Mod: GP

## 2017-06-23 PROCEDURE — 99233 SBSQ HOSP IP/OBS HIGH 50: CPT | Performed by: ORTHOPAEDIC SURGERY

## 2017-06-23 PROCEDURE — 80048 BASIC METABOLIC PNL TOTAL CA: CPT | Performed by: STUDENT IN AN ORGANIZED HEALTH CARE EDUCATION/TRAINING PROGRAM

## 2017-06-23 PROCEDURE — 83735 ASSAY OF MAGNESIUM: CPT | Performed by: STUDENT IN AN ORGANIZED HEALTH CARE EDUCATION/TRAINING PROGRAM

## 2017-06-23 PROCEDURE — 00000146 ZZHCL STATISTIC GLUCOSE BY METER IP

## 2017-06-23 PROCEDURE — 40000193 ZZH STATISTIC PT WARD VISIT

## 2017-06-23 PROCEDURE — 36415 COLL VENOUS BLD VENIPUNCTURE: CPT | Performed by: STUDENT IN AN ORGANIZED HEALTH CARE EDUCATION/TRAINING PROGRAM

## 2017-06-23 RX ORDER — FERROUS SULFATE 325(65) MG
325 TABLET ORAL 2 TIMES DAILY
Status: DISCONTINUED | OUTPATIENT
Start: 2017-06-23 | End: 2017-06-25 | Stop reason: HOSPADM

## 2017-06-23 RX ORDER — WARFARIN SODIUM 7.5 MG/1
7.5 TABLET ORAL
Status: COMPLETED | OUTPATIENT
Start: 2017-06-23 | End: 2017-06-23

## 2017-06-23 RX ADMIN — SODIUM BICARBONATE 650 MG TABLET 650 MG: at 20:26

## 2017-06-23 RX ADMIN — DEXTROSE AND SODIUM CHLORIDE 1000 ML: 5; 450 INJECTION, SOLUTION INTRAVENOUS at 10:00

## 2017-06-23 RX ADMIN — METOPROLOL TARTRATE 50 MG: 50 TABLET, FILM COATED ORAL at 20:26

## 2017-06-23 RX ADMIN — PRAVASTATIN SODIUM 40 MG: 40 TABLET ORAL at 09:54

## 2017-06-23 RX ADMIN — METOPROLOL TARTRATE 50 MG: 50 TABLET, FILM COATED ORAL at 09:53

## 2017-06-23 RX ADMIN — SODIUM BICARBONATE 650 MG TABLET 650 MG: at 09:54

## 2017-06-23 RX ADMIN — PRAMIPEXOLE DIHYDROCHLORIDE 1 MG: 0.25 TABLET ORAL at 21:26

## 2017-06-23 RX ADMIN — ISOSORBIDE MONONITRATE 30 MG: 30 TABLET, EXTENDED RELEASE ORAL at 09:54

## 2017-06-23 RX ADMIN — WARFARIN SODIUM 7.5 MG: 7.5 TABLET ORAL at 17:11

## 2017-06-23 RX ADMIN — IRON 325 MG: 65 TABLET ORAL at 20:26

## 2017-06-23 NOTE — PLAN OF CARE
Problem: Goal Outcome Summary  Goal: Goal Outcome Summary  PT 5A: Initial Evaluation - Pt admitted from TCU where she reports mobilizing with SBA-CGA for relatively long distances with use of 2WW. Reports being nearly IND with ADLs and cares at rehab as well. Prior to this, pt was living at home with  and was IND with ADLs and mod-I for mobility with use of 2WW. Currently pt is transferring sit<>stand and ambulating 310' with use of 2WW and SBA. Pt is very self aware and has good insights into deficits with mobility tasks. Pt is very much wishing to d/c to home. Reports having 24/7 assist from family and friends that are willing to come live with her to help out. Also, pt is agreeable to HH PT/OT services. Discussed with pt that her functional recovery would be quicker in a rehab setting and that if she wishes to return home, she will require 24/7 supervision/SBA and HH PT/OT services. Pt understands this trade off and continues to request returning home with these stipulations.      REC: TCU is PT preference to help facilitate quicker recovery to PLOF, however pt has good social support and is very pleasant, but adamant about wanting to return home. If pt chooses to d/c home, currently would require 24/7 supervision/SBA and HH PT/OT services. Family teaching session to occur prior to d/c with at least 1 family member. If these things are not able to be provided for home safety, pt will need TCU placement.

## 2017-06-23 NOTE — PROGRESS NOTES
06/23/17 0991   Quick Adds   Type of Visit Initial PT Evaluation   Living Environment   Lives With spouse   Living Arrangements house   Home Accessibility grab bars present (bathtub);grab bars present (toilet)   Number of Stairs to Enter Home 0   Number of Stairs Within Home 0   Stair Railings at Home none   Transportation Available car;family or friend will provide   Living Environment Comment Pt lives with spouse and 18 year old granddaughter who moved in to help with pt's care. Pt also reports that family friends are moving into their home and are willing to help out as needed. Reports she could have 24/7 A if needed. Family and friends are very supportive.    Self-Care   Dominant Hand right   Usual Activity Tolerance moderate   Current Activity Tolerance fair   Regular Exercise yes   Activity/Exercise Type strength training;walking   Exercise Amount/Frequency daily   Equipment Currently Used at Home walker, rolling  (2WW and 4WW)   Activity/Exercise/Self-Care Comment Pt admitted from TCU where she reports being SBA-CGA for most mobility tasks. Was getting daily PT/OT. Pt has both 2WW and 4WW at home. Enjoys going to [employer], but has not been able to recently d/t inability to climb [employer] steps (4-5).   Functional Level Prior   Ambulation 1-->assistive equipment   Transferring 1-->assistive equipment   Toileting 1-->assistive equipment   Bathing 2-->assistive person   Dressing 1-->assistive equipment   Eating 0-->independent   Communication 0-->understands/communicates without difficulty   Swallowing 0-->swallows foods/liquids without difficulty   Fall history within last six months no   Which of the above functional risks had a recent onset or change? ambulation;transferring   Prior Functional Level Comment Pt reports mobilizing at TCU with SBA-CGA for relatively long distances with use of 2WW. Reports being nearly IND with ADLs and cares at rehab as well. Prior to this, pt was living at home with  and  was IND with ADLs and mod-I for mobility with use of 2WW.   General Information   Onset of Illness/Injury or Date of Surgery - Date 06/21/17   Referring Physician Vibha Barfield MD   Patient/Family Goals Statement To go home   Pertinent History of Current Problem (include personal factors and/or comorbidities that impact the POC) 77 year old female with a past medical history significant for CKD stage IV secondary to Type II DM, CAD, HFpEF and A Fib with MARTÍN that is likely multifactorial due to cardiorenal syndrome, pre renal from overdiuresis, renal damage from UTI and hemodynamic variations in the setting of A Fib with RVR.  She is admitted for worsening of creatinine thought to be pre renal in etiology due to diarrhea.   Precautions/Limitations fall precautions   Heart Disease Risk Factors Diabetes;Lack of physical activity;Overweight;Medical history;Age   General Observations Seated in w/c, pt is pleasant and appears comfortable, 1 peripheral IV, agreeable to PT. RN ok'd session.    General Info Comments Activity: Up ad beata   Cognitive Status Examination   Orientation orientation to person, place and time   Level of Consciousness alert   Follows Commands and Answers Questions 100% of the time   Personal Safety and Judgment intact   Memory intact   Pain Assessment   Patient Currently in Pain No   Integumentary/Edema   Integumentary/Edema Comments Mild LE edema, non-pitting. Skin abrasion on L anterior lower leg - no signs of infection.   Posture    Posture Kyphosis  (mild)   Range of Motion (ROM)   ROM Comment WNL based on functional movements   Strength   Strength Comments Mild functional weakness present based on mobility assessment   Bed Mobility   Bed Mobility Comments NT: pt up to chair upon arrival. Does not wish to return to bed.   Transfer Skills   Transfer Comments SBA for sit<>stand with 2WW   Gait   Gait Comments 310' with use of 2WW and SBA   Balance   Balance Comments IND with sitting and  "standing balance and 2WW, SBA for dynamic balance with 2WW   Sensory Examination   Sensory Perception Comments N/T in soles of feet. Has not worsened since admission.   Coordination   Coordination no deficits were identified   Muscle Tone   Muscle Tone no deficits were identified   General Therapy Interventions   Planned Therapy Interventions balance training;bed mobility training;gait training;neuromuscular re-education;strengthening;transfer training;risk factor education;home program guidelines;progressive activity/exercise   Clinical Impression   Criteria for Skilled Therapeutic Intervention yes, treatment indicated   PT Diagnosis Dec IND and safety with functional mobility   Influenced by the following impairments Functional strength/balance deficits, decreased activity tolerance   Functional limitations due to impairments Dec IND and safety with bed mobility, transfers, gait, stair negotiation, and activity tolerance for ADLs and mobility tasks.    Clinical Presentation Stable/Uncomplicated   Clinical Presentation Rationale Based on improving medical status, mild change in mobility status from baseline, and clinical judgement.    Clinical Decision Making (Complexity) Low complexity   Therapy Frequency` daily   Predicted Duration of Therapy Intervention (days/wks) 3 days   Anticipated Equipment Needs at Discharge other (see comments)  (none)   Anticipated Discharge Disposition Home with Home Therapy   Risk & Benefits of therapy have been explained Yes   Patient, Family & other staff in agreement with plan of care Yes   [facility] [employer] \"6 Clicks\"   2016, Trustees of [facility], under license to [employer].  All rights reserved.   6 Clicks Short Forms Basic Mobility Inpatient Short Form   [facility] [employer]  \"6 Clicks\" V.2 Basic Mobility Inpatient Short Form   1. Turning from your back to your side while in a flat bed without using bedrails? 4 - None   2. Moving from lying on your back " to sitting on the side of a flat bed without using bedrails? 4 - None   3. Moving to and from a bed to a chair (including a wheelchair)? 4 - None   4. Standing up from a chair using your arms (e.g., wheelchair, or bedside chair)? 4 - None   5. To walk in hospital room? 4 - None   6. Climbing 3-5 steps with a railing? 3 - A Little   Basic Mobility Raw Score (Score out of 24.Lower scores equate to lower levels of function) 23   Total Evaluation Time   Total Evaluation Time (Minutes) 5

## 2017-06-23 NOTE — PLAN OF CARE
Problem: Goal Outcome Summary  Goal: Goal Outcome Summary  Outcome: Improving  [id number]: AOx4. Up with SBA. IVF bolus completed. Eating good. Encouraged fluid intake.

## 2017-06-23 NOTE — PROGRESS NOTES
Social Work Services Progress Note    Hospital Day: 3  Date of Initial Social Work Evaluation:  6/22/2017  Collaborated with:  Chart review, 6D SW, medicine team    Data:  Kathryn is a 77 year old female admitted to [facility] on 6/21 from [facility] TCU. Anticipating ready to DC this weekend, 6D SW spoke with GA and they want new documentation, patient does NOT have a bed hold there.     Intervention:  SW faxed a new referral to   [address] [address]  Fax: [phone]  [place] Info: [phone]  Main [phone]  Admissions: [phone]    Assessment:  Per PT notes from today, patient wants to DC home and could do so if there is 24/7 supervision and cares, and a family member attends teaching prior to DC.     Plan:    Anticipated Disposition:  TBD, home vs TCU    Barriers to d/c plan:  Medical readiness, likely ready tomorrow.    Follow Up:  TCU referral has been sent, patient has both options available to either DC to home with 24/7 support vs DC to TCU. TBD. Weekend SW/RNCC to follow up.    Alyx RAMAN, MSW  5B  (Medical/Surgical)  Phone: [phone]  Pager: [phone]

## 2017-06-23 NOTE — PLAN OF CARE
Problem: Goal Outcome Summary  Goal: Goal Outcome Summary  Outcome: No Change  2015-0700: Pt transferred to unit at 2015. A&Ox4 VSS on RA while awake, CPap overnight. SBA + walker to bathroom. 1L of d5 completed, MD notified. Right PIV SL. Bg check at HS and overnight. No insulin administered. Regular diet, good appetite. Denies pain/nausea. Skin tear on LLE open to air per pt request. Would like to be redressed today. Continue with POC.

## 2017-06-23 NOTE — PROGRESS NOTES
/68  Pulse 78  Temp 98.3  F (36.8  C) (Oral)  Resp 16  Wt 76.3 kg (168 lb 4.8 oz)  SpO2 95%  BMI 28.89 kg/m2  Lungs clear in upper and lower lobes bilaterally and posteriorly. Patient denies pain. LLE wound is covered, bandage is CDI. Patient is standby assist of one with transfers and ambulation. Blood sugar at dinner was 246, insulin given per sliding scale order. Patient has Dextrose 5% infusing at 125mL/hr. She is alert and oriented x 4 and able to make needs known. Will continue to monitor.

## 2017-06-23 NOTE — PROGRESS NOTES
[facility], [place]    Internal Medicine Progress Note - [facility]    Main Plans for Today   -- D5 1/2 NS IVF 1 L @ 125 mL/hr  -- continue strict I/O's, daily weights    Assessment & Plan   Kathryn Banks is a 77 year old female with a PMHx significant for CKD stage IV, multiple cardiovascular diseases including CAD s/p 5 vessel CABG (2006), HFpEF with demonstrated grade III left ventricular diastolic dysfunction, HTN, A fib, and DMT2 controlled by diet, ANABEL on CPAP, admitted for progressive renal dysfunction found on labs in outpatient nephrology clinic.      # MARTÍN on CKD vs. progression of underlying CKD, now stage 4  # Hyperphosphatemia  Creatinine gradually creeping up since beginning of year. Was 1.26 in January and gradually elevated since then; 2.29 on OSH admission on 5/31, 2.49 on second admission on 6/12, jumped to 4.03 by discharge on 6/16, 4.26 in nephrology clinic AM before this admission (6/21).  Unlikely postrenal: US renal on 5/31 with no evidence of stone, mass, or hydronephrosis, no evidence of outlet obstruction. Likely intrarenal component as FENa on 5/31 5.7 % and on 6/12 14.3 %, she reports she only uses NSAIDs rarely. She is type 2 diabetic, however blood sugars have been well controlled with diet only. She has in past 2 months been admitted for sepsis 2/2 klebsiella UTI, then found to have UTI again on 6/12 admission. Possible component of MARTÍN 2/2 complicated UTI, however cannot yet rule out other glomerulonephritic causes. Suspect may have component of ATN 2/2 aggressive diuresis in recent hospitalizations, Cr has been gradually increasing for several months now, but with dramatic increase during last hospitalization (2.49 --> 4.03). FEUrea calculated 6/22 is 49.3%, suggestive of intrinsic renal disorder. No evidence of hemorrhage or increased diarrhea (diarrhea at baseline since bariatric surgery years ago), has a history of HFpEF with EF 60-65% on last  echo 5/31 (previous echo revealed grade III left ventricular diastolic dysfunction), however appears hypovolemic on presentation. Improving with fluid replacement; received 1 L D5 yesterday (6/22) with improving Cr (3.19 6/23 vs. 4.19 6/22) and improving hypernatremia. Phosphorus on admission (6/21) was 6.0 mg/dL, 5.2 mg/dL on 6/23; likely 2/2 declined renal function.  -- nephrology consult, appreciate recs. Per nephrology, fluid replacement with D5 1/2 NS (recommended D5 in AM of 6/22 via phone, per 6/22 PM nephro note recommend D5 1/2 NS), and bicarb replacement                         - D5 1/2 NS IVF @ 125 mL/hr                          - Sodium bicarbonate 650 mg PO BID  -- UA with reflex culture, see below    # Asymptomatic bacteriuria, Citrobacter freundii  UTI during recent admission 4/28/2017 - 5/2/2017 with urine cx growing > 100,000 colonies Klebsiella, treated with cefdinir 300 mg PO daily x 7 days. Chart review reveals no other true UTI between then and current admission. UA on admission (6/21) slightly cloudy with albumin, large leukocyte esterase and  WBC / HPF. Patient reports being asymptomatic, likely contaminant rather than UTI, no strong inclination for empiric abx use at this time.  -- urine culture, grew 10,000 - 50,000 Citrobacter freundii. Small number of colonies and no recent history of catheterization, not suggestive of true UTI; likely fecal contaminant. Continue to monitor for signs/symptoms of UTI.    # Hypovolemic Hypernatremia  # Metabolic acidosis, normal anion gap  Sodium on admission 145 mmol/L. Last blood gas on 6/3/2017 with metabolic acidosis with normal anion gap (pH 7.32, low venous pCO2 39 mmHg, low venous bicarb 20 mmol/L, base deficit 5.7 mmol/L). Given declined renal function, likely d/t impaired ammoniagenesis per renal.  -- daily BMP    # Recent history of anasarca  # HFpEF EF 60-65%, Grade III left ventricular diastolic dysfunction  # Atrial  flutter/fibrillation  Last echo 5/31/2017 while with atrial fib/flutter, previous echo in 7/2016 with demonstrated grade III left ventricle diastolic dysfunction. Admission to OSH on 5/31 with BLE edema extending through part of trunk, had 30-40 pound weight gain in weeks leading up to hospitalization. Cardiac workup revealed no evidence of CHF exacerbation, edema was attributed to MARTÍN found at that time.  -- continue PTA metoprolol 50 mg PO BID  -- continue PTA warfarin, dosing per pharmacy  -- continue PTA pravastatin pravastatin 40 mg PO daily  -- monitor for volume overload and signs of HF      # CAD, s/p quintuple bypass (2006)  # Atrial flutter, s/p ablation  # Hypertension  -- continue PTA isosorbide mononitrate 30 mg (24 hr tablet) PO daily      # Diabetes mellitus, type 2  Diet controlled PTA. Monitor, sliding scale with aspart    # Chronic normocytic anemia:  Labs from 6/21 do not support iron deficiency. Ferritin previously consistent with BAYLEE in 2012.  B12 previously normal. On oral ferrous sulfate outpatient, held at admission. C-scope negative in 2007. EGD at that time also showed some aspirin related gastropathy. Inflammatory markers are negative, no signs or symptoms of systemic disease. Given ongoing CKD, may need to consider EPO         Diet: Regular Diet Adult  Fluids: D5 1/2 NS @ 125 mL/hr  DVT Prophylaxis: Low Risk/Ambulatory with no VTE prophylaxis indicated  Code Status: Full Code    Disposition Plan   Expected discharge: 2 - 3 days, recommended to transitional care unit once kidney function is stabilized.     Entered: Lizette Ballesteros 06/23/2017, 8:30 AM   Information in the above section will display in the discharge planner report.      The patient's care was discussed with the Attending Physician, Dr. Sheikh, Bedside Nurse, Care Coordinator/, Patient and Nephrology Consultant.    Lizette Ballesteros, MS4  [facility]: 4  Pager: [phone]  Please see  sticky note for cross cover information      Attending addendum:  A minimum of 4 point ROS is negative except as mentioned in the subjective section of the note. Doing well this AM. No signs of symptoms of UTI. No SOB or symptoms of hypervolemia    Labs and VS reviewed    /62 (BP Location: Right arm)  Pulse 78  Temp 96.8  F (36  C) (Oral)  Resp 16  Wt 75.5 kg (166 lb 8.3 oz)  SpO2 99%  BMI 28.58 kg/m2  She appears well today, NAD  Lungs are clear bilaterall  Heart is reg  Abn is soft and NT  There is stable non-pitting edma of the LE  Alert and taltive, normal mental stayts    I saw and evaluated this patient with MS4 Lizette. I agree with his/her assessment of Kathryn. Please see my edits above in blue as well as separate A/P below     Kathryn is a 78 yo woman admitted with MARTÍN on CKD.      1. MARTÍN on CKD with non-anion gap metabolic acidosis:  Hypernatremia:  Hyperphosphatemia    Etiology of the underlying renal disease is unclear and likely multifactorial. Does have HTN and DM. Protein at 0.89 gms daily. Did well with trial of fluids yesterday, BUN improved as did Cr. Phos is elevated at 5, Mg is normal.   - Doing well with oral bicarb  - Will discuss any additional recs with nephrology  - Will given additional liter of IVF today and follow weights.   - Discussed strict I and O, missed some intake yesterday    2. Chronic diastolic heart failure with grade III diastolic dysfunction:  Currently well compensated. Frac BUN not suggestive of intravascularly volume depletion. Diuertics are on hold. Once renal function as stablized, will need to make a plan for volume managmennt     3. Atrial flutter s/p aplation:  Currently well controlled and in sinus rhythm. Continues on warfarin. Continues on metop     4. HTN:   Well controlled, continue imdur. Holding ACE/ARB in setting of MARTÍN     5. Abnormal U/A:  ++ LE and WBC. Favoring asympt bacteria, culure with 10-50 citrobacter. GIven absence of UTI sytmpoms,  holding on empiric treatment.       Stewart Sheikh MD        Interval History   No acute events overnight. Patient did request pramipexole for restless leg syndrome and it was ordered by overnight MD. She has no complaints today. Clarification: Kathryn reports that her diarrhea is at baseline as it has been for years following bariatric surgery. She states her urine has been lighter in color and less cloudy over the last day. Denies frequency, dysuria, shortness of breath, chest pain, abdominal pain. Eating well, drinking well. Does note she feels her abdomen is slightly more bloated than usual but does not feel gassy or like she needs to make a BM. Patient has some questions/confusion regarding antibiotic treatments - she believes she was on oral antibiotics PTA and that she has had two UTIs in the past 2-3 months, not one.     Nursing notes reviewed, 4 point review of systems is negative except as noted above.      Physical Exam   Vital Signs: Temp: 97.6  F (36.4  C) Temp src: Oral BP: 129/54 Pulse: 76   Resp: 16 SpO2: 97 % O2 Device: None (Room air)    Weight: 166 lbs 8.32 oz  General Appearance: Alert, awake, sitting up at side of bed, in NAD  Respiratory: CTAB - no wheezes/rales/rhonchi  Cardiovascular: RRR - no murmurs/rubs/gallops  GI: Normoactive BS, somewhat firm, obese, non-tender to palpation  Skin: Normal skin turgor, 2-3 cm laceration with hematoma on left anterior leg mid-tibia, erythematous dry flaky skin of BLE distal to knees, no other obvious rashes or lesions  Other: Non-pitting edema of BLE distal to tibial plateaus      Data   Medications     Warfarin Therapy Reminder         dextrose 5% and 0.45% NaCl  1,000 mL Intravenous Once     sodium bicarbonate  650 mg Oral BID     insulin aspart  1-7 Units Subcutaneous TID AC     insulin aspart  1-5 Units Subcutaneous At Bedtime     pramipexole  1 mg Oral At Bedtime     pravastatin  40 mg Oral Daily     metoprolol  50 mg Oral BID     isosorbide  mononitrate  30 mg Oral Daily     sodium chloride (PF)  3 mL Intracatheter Q8H     Data     Recent Labs  Lab 06/23/17  0809 06/22/17  1513 06/22/17  0658 06/21/17  1645 06/21/17  1536 06/21/17  0842   WBC  --   --   --   --  6.0  --    HGB  --   --   --   --  9.0*  --    MCV  --   --   --   --  78  --    PLT  --   --   --   --  254  --    INR  --  2.49*  --  2.40*  --   --    *  --  148*  --  145* 146*   POTASSIUM 4.2  --  4.1  --   --  4.4   CHLORIDE 119*  --  121*  --   --  113*   CO2 16*  --  14*  --   --  18*   BUN 91*  --  105*  --   --  107*   CR 3.97*  --  4.19*  --  4.17* 4.26*   ANIONGAP 10  --  13  --   --  15*   MALICK 7.8*  --  7.5*  --   --  7.6*   GLC 93  --  57*  --   --  140*   ALBUMIN  --   --   --   --   --  2.9*   Magnesium 2.0 mg/dL  Phosphorus 5.2 mg/dL    Preliminary Urine Culture  Component Results      Component Collected Lab     Specimen Description 06/22/2017  1:19 AM 53     Unspecified Urine     Special Requests 06/22/2017  1:19 AM 77     Specimen received in preservative     Culture Micro (Abnormal) 06/22/2017  1:19      10,000 to 50,000 colonies/mL Citrobacter freundii complex Susceptibility testing    in progress   Culture in progress   <10,000 colonies/mL mixed urogenital tyler Susceptibility testing not routinely    done        Micro Report Status 06/22/2017  1:19      Pending

## 2017-06-23 NOTE — PROGRESS NOTES
Nephrology Progress Note  06/23/2017         Assessment & Recommendations:   Kathryn Banks is a 77 year old year old female with a past medical history significant for CKD stage IV secondary to Type II DM, CAD, HFpEF and A Fib with MARTÍN that is likely multifactorial due to cardiorenal syndrome, pre renal from overdiuresis, UTI and hemodynamic variations in the setting of A Fib with RVR.  She is admitted with worsening of creatinine thought to be pre renal in etiology due to diarrhea, poor oral intake and diuretic use.    1. MARTÍN on CKD--Stage IV CKD at baseline due to type II DM but developed MARTÍN during recent hospitalizations that is multifactorial due to cardiorenal syndrome overdiuresis, UTI and hemodynamic variations due to A Fib with RVR.  Creatinine is improving today down to 3.97 after receiving 1 L bolus of D5W.  Her worsening creatinine at this time is likely pre renal due to poor oral fluid intake, diarrhea and ongoing diuretic use.  ANCA negative, Urine sodium of 86.  Plan to give her 1 L bolus of D5 1/2 NS today and continue to monitor respiratory status for signs of volume overload.  Stop fluids when she is euvolemic.    2. Anion gap metabolic acidosis--Blood gas not done since 6/3/17 but had metabolic acidosis at this time. Likely due to impaired ammoniagenesis.  Sodium bicarbonate 650 mg PO BID started yesterday.  Bicarb improving up to 16.  Bicarb deficit of 300 meq.   3. Volume status--Continues to be hypovolemic.  1L bolus of D5 1/2 NS today and monitor for signs of euvolemia.  4. Electrolytes--Hypernatremia is improving at 145.  Likely due to GI and urine losses (loop diuretic) but improving with hydration.  Water deficit is 1.35 liters.  1L bolus of D5 1/2 NS over 8 hours today.  Reassess need for fluids in AM.  5. Anemia of CKD--Hgb of 9.0. Stable, iron sat is below goal of >30 and ferritin not high.  Would add ferrous sulfate 350 mg bid.  If hemoglobin does not improve with iron repletion  then she will need SUELLEN.   6. Hypertension--Normotensive at this time on metoprolol. Holding diuretics.    Seen and discussed with Dr. Cain. I am acting as a scribe for Dr. Cain.      Gerardo Carl MS IV  [phone]    Interval History :   In the last 24 hours Kathryn Banks received 1 L of D5W.  Plan on receiving 1 L of D5 1/2 NS.  She denies any shortness of breath.  Her appetite and thirst are improving.  Her diarrhea remains but is less watery than previously. She was also started on sodium bicarbonate 650 mg PO BID.    Review of Systems:   (4 pt ROS reviewed alone is not adequate unless you detail systems you reviewed)  I reviewed the following systems:  GI: improving appetite. no nausea or vomiting or diarrhea.   Neuro:  no confusion  Constitutional:  no fever or chills  CV: no dyspnea or edema.  denies chest pain.    Physical Exam:   I/O last 3 completed shifts:  In: 360 [P.O.:360]  Out: 400 [Urine:400]   /54 (BP Location: Right arm)  Pulse 76  Temp 97.6  F (36.4  C) (Oral)  Resp 16  Wt 75.5 kg (166 lb 8.3 oz)  SpO2 97%  BMI 28.58 kg/m2     GENERAL APPEARANCE: well appearing, in no acute distress, resting comfortably in chair  EYES:  no scleral icterus, pupils equal  HENT: mouth without ulcers or lesions  PULM: lungs clear to auscultation bilaterally, equal air movement, no clubbing  CV: 2/6 systolic murmur best heard at left sternal border, normal s1 and s2     -JVP: not seen     -edema: trace in lower extremities  GI: soft, non tender, non distended, bowel sounds are present  INTEGUMENT: warm, dry, no cyanosis   NEURO:  no asterixis     Labs:   All labs reviewed by me  Electrolytes/Renal -   Recent Labs   Lab Test  06/23/17   0809  06/22/17   0658  06/21/17   1536  06/21/17   0842   05/01/17   0518   04/29/17   1757   10/21/09   0839   NA  145*  148*  145*  146*   < >  146*   < >  145*   < >  141   POTASSIUM  4.2  4.1   --   4.4   < >  4.7   < >  5.6*   < >  4.0   CHLORIDE  119*  121*    --   113*   < >  122*   < >  120*   < >  113*   CO2  16*  14*   --   18*   < >  12*   < >  14*   < >  25   BUN  91*  105*   --   107*   < >  48*   < >  45*   < >  20   CR  3.97*  4.19*  4.17*  4.26*   < >  1.93*   < >  1.92*   < >  1.05*   GLC  93  57*   --   140*   < >  174*   < >  172*   < >  142*   MALICK  7.8*  7.5*   --   7.6*   < >  7.2*   < >  7.5*   < >  8.8   MAG  2.0   --    --    --    --   2.2   --   2.1   --    --    PHOS  5.2*   --    --   6.0*   --    --    --    --    --   4.2    < > = values in this interval not displayed.       CBC -   Recent Labs   Lab Test  06/21/17   1536  06/16/17   0615  06/15/17   0527   06/14/17   0551  06/13/17   0516   WBC  6.0   --    --    --   5.9  6.4   HGB  9.0*  9.0*  7.9*   < >  7.6*  8.0*   PLT  254   --    --    --   232  237    < > = values in this interval not displayed.       LFTs -   Recent Labs   Lab Test  06/21/17   0842  06/12/17   1250  05/31/17   1541  05/24/17   1147   ALKPHOS   --   177*  216*  250*   BILITOTAL   --   0.3  0.3  0.3   ALT   --   47  33  43   AST   --   42  21  20   PROTTOTAL   --   6.1*  5.6*  5.8*   ALBUMIN  2.9*  3.1*  2.5*  2.6*       Iron Panel -   Recent Labs   Lab Test  06/21/17   0842  06/14/17   1750  08/20/12   1146   IRON  36  26*  32*   IRONSAT  17  11*  11*   GWEN  196  87   --          Imaging:  All imaging studies reviewed by me.     Current Medications:    dextrose 5% and 0.45% NaCl  1,000 mL Intravenous Once     sodium bicarbonate  650 mg Oral BID     insulin aspart  1-7 Units Subcutaneous TID AC     insulin aspart  1-5 Units Subcutaneous At Bedtime     pramipexole  1 mg Oral At Bedtime     pravastatin  40 mg Oral Daily     metoprolol  50 mg Oral BID     isosorbide mononitrate  30 mg Oral Daily     sodium chloride (PF)  3 mL Intracatheter Q8H       Warfarin Therapy Reminder       Gerardo Carl     I agree with the PFSH and ROS as completed by the MS.  The remainder of the encounter was performed by me and scribed by the MS.  The  scribed note accurately reflects my personal services and the decisions made by me.    Emy Cain MD  [facility]  Department of Medicine  Division of Renal Disease and Hypertension  [phone]

## 2017-06-23 NOTE — PLAN OF CARE
Problem: Goal Outcome Summary  Goal: Goal Outcome Summary  Outcome: No Change  VSS on room air, AOx4, up SBA/walker to bathroom. Strict I/O's followed. Pt denies pain/n/v. MIVF D5 + .45 NS at 125ml/hr- Bolus infusing- PIV site bloody around site. Pt on regular diet- tolerating well. BG managed with ss. Calls appropriately. Will continue to follow POC/monitor.

## 2017-06-24 ENCOUNTER — APPOINTMENT (OUTPATIENT)
Dept: PHYSICAL THERAPY | Facility: CLINIC | Age: 77
DRG: 683 | End: 2017-06-24
Attending: INTERNAL MEDICINE
Payer: MEDICARE

## 2017-06-24 LAB
ANION GAP SERPL CALCULATED.3IONS-SCNC: 9 MMOL/L (ref 3–14)
BUN SERPL-MCNC: 86 MG/DL (ref 7–30)
CALCIUM SERPL-MCNC: 7.9 MG/DL (ref 8.5–10.1)
CHLORIDE SERPL-SCNC: 121 MMOL/L (ref 94–109)
CO2 SERPL-SCNC: 16 MMOL/L (ref 20–32)
CREAT SERPL-MCNC: 3.79 MG/DL (ref 0.52–1.04)
GFR SERPL CREATININE-BSD FRML MDRD: 12 ML/MIN/1.7M2
GLUCOSE BLDC GLUCOMTR-MCNC: 103 MG/DL (ref 70–99)
GLUCOSE BLDC GLUCOMTR-MCNC: 111 MG/DL (ref 70–99)
GLUCOSE BLDC GLUCOMTR-MCNC: 117 MG/DL (ref 70–99)
GLUCOSE BLDC GLUCOMTR-MCNC: 123 MG/DL (ref 70–99)
GLUCOSE BLDC GLUCOMTR-MCNC: 126 MG/DL (ref 70–99)
GLUCOSE BLDC GLUCOMTR-MCNC: 154 MG/DL (ref 70–99)
GLUCOSE BLDC GLUCOMTR-MCNC: 166 MG/DL (ref 70–99)
GLUCOSE BLDC GLUCOMTR-MCNC: 185 MG/DL (ref 70–99)
GLUCOSE BLDC GLUCOMTR-MCNC: 204 MG/DL (ref 70–99)
GLUCOSE BLDC GLUCOMTR-MCNC: 219 MG/DL (ref 70–99)
GLUCOSE BLDC GLUCOMTR-MCNC: 274 MG/DL (ref 70–99)
GLUCOSE BLDC GLUCOMTR-MCNC: 95 MG/DL (ref 70–99)
GLUCOSE SERPL-MCNC: 116 MG/DL (ref 70–99)
INR PPP: 3.81 (ref 0.86–1.14)
POTASSIUM SERPL-SCNC: 4.8 MMOL/L (ref 3.4–5.3)
SODIUM SERPL-SCNC: 146 MMOL/L (ref 133–144)

## 2017-06-24 PROCEDURE — 00000146 ZZHCL STATISTIC GLUCOSE BY METER IP

## 2017-06-24 PROCEDURE — 40000275 ZZH STATISTIC RCP TIME EA 10 MIN: Performed by: OPTOMETRIST

## 2017-06-24 PROCEDURE — 80048 BASIC METABOLIC PNL TOTAL CA: CPT | Performed by: STUDENT IN AN ORGANIZED HEALTH CARE EDUCATION/TRAINING PROGRAM

## 2017-06-24 PROCEDURE — 40000193 ZZH STATISTIC PT WARD VISIT

## 2017-06-24 PROCEDURE — 36415 COLL VENOUS BLD VENIPUNCTURE: CPT | Performed by: STUDENT IN AN ORGANIZED HEALTH CARE EDUCATION/TRAINING PROGRAM

## 2017-06-24 PROCEDURE — 94660 CPAP INITIATION&MGMT: CPT | Performed by: OPTOMETRIST

## 2017-06-24 PROCEDURE — 25000128 H RX IP 250 OP 636: Performed by: STUDENT IN AN ORGANIZED HEALTH CARE EDUCATION/TRAINING PROGRAM

## 2017-06-24 PROCEDURE — 97530 THERAPEUTIC ACTIVITIES: CPT | Mod: GP

## 2017-06-24 PROCEDURE — 40000141 ZZH STATISTIC PERIPHERAL IV START W/O US GUIDANCE

## 2017-06-24 PROCEDURE — A9270 NON-COVERED ITEM OR SERVICE: HCPCS | Mod: GY | Performed by: STUDENT IN AN ORGANIZED HEALTH CARE EDUCATION/TRAINING PROGRAM

## 2017-06-24 PROCEDURE — 85610 PROTHROMBIN TIME: CPT | Performed by: ORTHOPAEDIC SURGERY

## 2017-06-24 PROCEDURE — 25000132 ZZH RX MED GY IP 250 OP 250 PS 637: Mod: GY | Performed by: STUDENT IN AN ORGANIZED HEALTH CARE EDUCATION/TRAINING PROGRAM

## 2017-06-24 PROCEDURE — 12000001 ZZH R&B MED SURG/OB UMMC

## 2017-06-24 PROCEDURE — 36415 COLL VENOUS BLD VENIPUNCTURE: CPT | Performed by: ORTHOPAEDIC SURGERY

## 2017-06-24 PROCEDURE — 99233 SBSQ HOSP IP/OBS HIGH 50: CPT | Performed by: ORTHOPAEDIC SURGERY

## 2017-06-24 RX ADMIN — IRON 325 MG: 65 TABLET ORAL at 20:15

## 2017-06-24 RX ADMIN — PRAMIPEXOLE DIHYDROCHLORIDE 1 MG: 0.25 TABLET ORAL at 23:05

## 2017-06-24 RX ADMIN — ISOSORBIDE MONONITRATE 30 MG: 30 TABLET, EXTENDED RELEASE ORAL at 09:27

## 2017-06-24 RX ADMIN — METOPROLOL TARTRATE 50 MG: 50 TABLET, FILM COATED ORAL at 20:15

## 2017-06-24 RX ADMIN — DEXTROSE MONOHYDRATE 500 ML: 50 INJECTION, SOLUTION INTRAVENOUS at 15:33

## 2017-06-24 RX ADMIN — SODIUM BICARBONATE 650 MG TABLET 650 MG: at 09:27

## 2017-06-24 RX ADMIN — PRAVASTATIN SODIUM 40 MG: 40 TABLET ORAL at 09:27

## 2017-06-24 RX ADMIN — IRON 325 MG: 65 TABLET ORAL at 09:27

## 2017-06-24 RX ADMIN — METOPROLOL TARTRATE 50 MG: 50 TABLET, FILM COATED ORAL at 09:27

## 2017-06-24 RX ADMIN — SODIUM BICARBONATE 650 MG TABLET 650 MG: at 20:15

## 2017-06-24 NOTE — PROGRESS NOTES
[facility], [place]    Internal Medicine Progress Note - [facility]    Main Plans for Today   - 500 mL D5 bolus @ 125 mL/hr  - reassess volume status, renal function, phosphorus levels in AM; possible d/c tomorrow AM    Assessment & Plan   Kathryn Banks is a 77 year old female with a PMHx significant for CKD stage IV, multiple cardiovascular diseases including CAD s/p 5 vessel CABG (2006), HFpEF with demonstrated grade III left ventricular diastolic dysfunction, HTN, A fib, and DMT2 controlled by diet, ANABEL on CPAP, admitted for progressive renal dysfunction found on labs in outpatient nephrology clinic.      # MARTÍN on CKD vs. progression of underlying CKD, now stage 4  # Hyperphosphatemia  Creatinine gradually creeping up since beginning of year. Was 1.26 in January and gradually elevated since then; 2.29 on OSH admission on 5/31, 2.49 on second admission on 6/12, jumped to 4.03 by discharge on 6/16, 4.26 in nephrology clinic AM before this admission (6/21).  Unlikely postrenal: US renal on 5/31 with no evidence of stone, mass, or hydronephrosis, no evidence of outlet obstruction. Likely intrarenal component as FENa on 5/31 5.7 % and on 6/12 14.3 %, she reports she only uses NSAIDs rarely. She is type 2 diabetic, however blood sugars have been well controlled with diet only. She has in past 2 months been admitted for sepsis 2/2 klebsiella UTI, then found to have UTI again on 6/12 admission. Possible component of MARTÍN 2/2 complicated UTI, however cannot yet rule out other glomerulonephritic causes. Suspect may have component of ATN 2/2 aggressive diuresis in recent hospitalizations, Cr has been gradually increasing for several months now, but with dramatic increase during last hospitalization (2.49 --> 4.03). FEUrea calculated 6/22 is 49.3%, suggestive of intrinsic renal disorder. No evidence of hemorrhage or increased diarrhea (diarrhea at baseline since bariatric surgery years  ago), has a history of HFpEF with EF 60-65% on last echo 5/31 (previous echo revealed grade III left ventricular diastolic dysfunction), however appears hypovolemic on presentation. Improving with fluid replacement; received 1 L D5 yesterday (6/22) with improving Cr (3.19 6/23 vs. 4.19 6/22) and improving hypernatremia. Phosphorus on admission (6/21) was 6.0 mg/dL, 5.2 mg/dL on 6/23; likely 2/2 declined renal function.   -- nephrology consult, appreciate recs. Per nephrology, fluid replacement with D5 1/2 NS (recommended D5 in AM of 6/22 via phone, per 6/22 PM nephro note recommend D5 1/2 NS), and bicarb replacement.                          - Sodium bicarbonate 650 mg PO BID  -- D5 500 mL @ 125 mL/hr; to monitor volume status and renal function overnight after completion of IVFs  -- UA with reflex culture, see below     # Asymptomatic bacteriuria, Citrobacter freundii  UTI during recent admission 4/28/2017 - 5/2/2017 with urine cx growing > 100,000 colonies Klebsiella, treated with cefdinir 300 mg PO daily x 7 days. Chart review reveals no other true UTI between then and current admission. UA on admission (6/21) slightly cloudy with albumin, large leukocyte esterase and  WBC / HPF. Patient reports being asymptomatic, likely contaminant rather than UTI, no strong inclination for empiric abx use at this time.  -- urine culture, grew 10,000 - 50,000 Citrobacter freundii. Small number of colonies and no recent history of catheterization, not suggestive of true UTI; likely fecal contaminant. Continue to monitor for signs/symptoms of UTI.     # Hypovolemic Hypernatremia  # Metabolic acidosis, normal anion gap  Sodium on admission 145 mmol/L. Last blood gas on 6/3/2017 with metabolic acidosis with normal anion gap (pH 7.32, low venous pCO2 39 mmHg, low venous bicarb 20 mmol/L, base deficit 5.7 mmol/L). Given declined renal function, likely d/t impaired ammoniagenesis per renal.  -- daily BMP     # Recent history of  anasarca  # HFpEF EF 60-65%, Grade III left ventricular diastolic dysfunction  # Atrial flutter/fibrillation  Last echo 5/31/2017 while with atrial fib/flutter, previous echo in 7/2016 with demonstrated grade III left ventricle diastolic dysfunction. Admission to OSH on 5/31 with BLE edema extending through part of trunk, had 30-40 pound weight gain in weeks leading up to hospitalization. Cardiac workup revealed no evidence of CHF exacerbation, edema was attributed to MARTÍN found at that time.  -- continue PTA metoprolol 50 mg PO BID  -- continue PTA warfarin, dosing per pharmacy  -- continue PTA pravastatin pravastatin 40 mg PO daily  -- monitor for volume overload and signs of HF      # CAD, s/p quintuple bypass (2006)  # Atrial flutter, s/p ablation  # Hypertension  -- continue PTA isosorbide mononitrate 30 mg (24 hr tablet) PO daily      # Diabetes mellitus, type 2  Diet controlled PTA. Monitor, sliding scale with aspart     # Chronic normocytic anemia  Labs from 6/21 do not support iron deficiency. Ferritin previously consistent with BAYLEE in 2012.  B12 previously normal. On oral ferrous sulfate outpatient, held at admission. C-scope negative in 2007. EGD at that time also showed some aspirin related gastropathy. Inflammatory markers are negative, no signs or symptoms of systemic disease. Given ongoing CKD, may need to consider EPO        Diet: Regular Diet Adult  Fluids: D5 500 mL @ 125 mL/hr  DVT Prophylaxis: Low Risk/Ambulatory with no VTE prophylaxis indicated  Code Status: Full Code    Disposition Plan   Expected discharge: Tomorrow, recommended to home with support of family (graduated from PT this AM, no longer appropriate for TCU) if renal function and phosphorus levels continue to hold steady.     Entered: Lizette Ballesteros 06/24/2017, 1:18 PM   Information in the above section will display in the discharge planner report.      The patient's care was discussed with the Attending Physician, Dr. Edouard  "Aguilar.    Lizette Ballesteros  [facility]  [place]oon: 4  Pager: [phone]  Please see sticky note for cross cover information    Attending addendum:  A minimum of 4 point ROS is negative except as mentioned in the subjective section of the note. Doing well this AM, feeling well enough to leave. Remains without signs of symptoms of UTI. No SOB or symptoms of hypervolemia. Worked with PT yesterday, appears to have been cleared for home discharge with assitance. They to recommend ongoing PT     Labs and VS reviewed     /64 (BP Location: Right arm)  Pulse 80  Temp 97.5  F (36.4  C) (Oral)  Resp 15  Ht 1.626 m (5' 4\")  Wt 76 kg (167 lb 8 oz)  SpO2 96%  BMI 28.75 kg/m2  She appears well today, NAD. Dry MM  Lungs are clear bilaterally  Heart is reg  Abn is soft and NT  There is stable non-pitting edma of the LE  Alert and talkative, normal mental status     I saw and evaluated this patient with MS4 Lizette. I agree with his/her assessment of Kathryn. Please see my edits above in blue as well as separate A/P below      Kathryn is a 76 yo woman admitted with MARTÍN on CKD.       1. MARTÍN on CKD with non-anion gap metabolic acidosis:  Hypernatremia:  Hyperphosphatemia    Etiology of the underlying renal disease is unclear and likely multifactorial. Does have HTN and DM. Protein at 0.89 gms daily. Did well with trial of fluids yesterday, BUN improved as did Cr for the second day in a row. Phos is elevated at 5, Mg is normal.   - Doing well with oral bicarb  - Will discuss any additional recs with nephrology, plan for additional IVF today to help replace free water deficit, if all stable, likely home tomorrow.   - Discussed strict I and O, missed some intake yesterday     2. Chronic diastolic heart failure with grade III diastolic dysfunction:  Currently well compensated. Frac BUN not suggestive of intravascularly volume depletion but this now seems to have been the case based on her response to IVF. " Diuertics are on hold. Once renal function as stablized, will need to make a plan for volume management. Appears that more appropriate goal dry weight of 167-170 is likely more appropriate     3. Atrial flutter s/p aplation:  Currently well controlled and in sinus rhythm. Continues on warfarin. Continues on metop      4. HTN:   Well controlled, continue imdur. Holding ACE/ARB in setting of MARTÍN      5. Abnormal U/A:  ++ LE and WBC. Favoring asympt bacteria, culure with 10-50 citrobacter. GIven absence of UTI sytmpoms, holding on empiric treatment.      Stewart Sheikh MD    Interval History   No acute events overnight. Patient states she is ready to go home and that PT no longer recommends TCU upon discharge. No complaints.    4 point review of systems negative    Physical Exam   Vital Signs: Temp: 97.5  F (36.4  C) Temp src: Oral BP: 126/64 Pulse: 80   Resp: 15 SpO2: 96 % O2 Device: None (Room air)    Weight: 167 lbs 8 oz  General Appearance: Initially asleep, sitting upright in chair, easily awoken and alert, pleasant, in NAD  Respiratory: CTAB - no wheezes/rales/rhonchi  Cardiovascular: RRR - no murmurs/rubs/gallops  GI: Normoactive BS, soft, obese  Skin: No obvious rashes or lesions  Extremities: non-pitting edema of BLE unchanged      Data     Recent Labs  Lab 06/24/17  0924 06/24/17  0713 06/23/17  0809 06/22/17  1513 06/22/17  0658 06/21/17  1645 06/21/17  1536 06/21/17  0842   WBC  --   --   --   --   --   --  6.0  --    HGB  --   --   --   --   --   --  9.0*  --    MCV  --   --   --   --   --   --  78  --    PLT  --   --   --   --   --   --  254  --    INR 3.81*  --   --  2.49*  --  2.40*  --   --    NA  --  146* 145*  --  148*  --  145* 146*   POTASSIUM  --  4.8 4.2  --  4.1  --   --  4.4   CHLORIDE  --  121* 119*  --  121*  --   --  113*   CO2  --  16* 16*  --  14*  --   --  18*   BUN  --  86* 91*  --  105*  --   --  107*   CR  --  3.79* 3.97*  --  4.19*  --  4.17* 4.26*   ANIONGAP  --  9 10  --  13  --    --  15*   MALICK  --  7.9* 7.8*  --  7.5*  --   --  7.6*   GLC  --  116* 93  --  57*  --   --  140*   ALBUMIN  --   --   --   --   --   --   --  2.9*

## 2017-06-24 NOTE — PLAN OF CARE
Problem: Goal Outcome Summary  Goal: Goal Outcome Summary  Outcome: No Change  Pt  A&O, VSS, and up with SBA to bathroom.  No c/o pain or SOB.  Tolerating diet with good appetite.  BG stable with SSI available.  PIV saline locked; flushing without difficulties.  2 BM this shift, no reported difficulties voiding.  Pt is able to make needs known.  Nephrology should be checking on pt this AM.  Will continue to monitor and follow POC.

## 2017-06-24 NOTE — PLAN OF CARE
Problem: Goal Outcome Summary  Goal: Goal Outcome Summary  PT 5A: patient demonstrates independence with bed mobility, transfers, and gait using FWW this day. Ambulates ~350 feet x2 mod I. Patient does not have stairs to negotiate at home and has assistance with iADLs as needed. Patient has met all PT goals, will complete orders.      Recommend dc home with assist, as needed, and HH PT/OT to continue progressing strength and activity tolerance.      Physical Therapy Discharge Summary     Reason for therapy discharge:    All goals and outcomes met, no further needs identified.     Progress towards therapy goal(s). See goals on Care Plan in [facility] electronic health record for goal details.  Goals met     Therapy recommendation(s):    Continued therapy is recommended.  Rationale/Recommendations:  HH PT/OT to progress safety and indep with functional mobility.

## 2017-06-24 NOTE — PLAN OF CARE
Problem: Goal Outcome Summary  Goal: Goal Outcome Summary     OT-5a: Cancel OT eval, per PT pt has no OT needs at this time, will d/c consult.

## 2017-06-24 NOTE — PROGRESS NOTES
Nephrology Progress Note  06/24/2017         Assessment & Recommendations:   Kathryn Banks is a 77 year old year old female with a past medical history significant for CKD stage IV secondary to Type II DM, CAD, HFpEF and A Fib with MARTÍN that is likely multifactorial due to cardiorenal syndrome, pre renal from overdiuresis, UTI and hemodynamic variations in the setting of A Fib with RVR.  She is admitted with worsening of creatinine thought to be pre renal in etiology due to diarrhea, poor oral intake and diuretic use.    1. MARTÍN on CKD--Stage 4 CKD at baseline due to DM2 but developed MARTÍN during recent hospitalizations that is multifactorial due to cardiorenal syndrome overdiuresis, UTI and hemodynamic variations due to A Fib with RVR.  Creatinine marginally improved.  Continued gentle volume expansion     2. Anion gap metabolic acidosis--Blood gas not done since 6/3/17 but had metabolic acidosis at this time. Likely due to impaired ammoniagenesis.  Sodium bicarbonate 650 mg PO BID started yesterday.  Bicarb improving up to 16.  Bicarb deficit of 300 meq.   3. Volume status--I think she would tolerate another 500 ml fluids.  Hold diuretics on discharge.  Will plan weekly nurses visit with nephrology nurse following discharge and resume diuretics when indicated.  I discussed this with Dr. Barfield and with inpatient team  4. Electrolytes--Hypernatremia sodium mildly worse at 146, advised higher oral fluid intake  5. Anemia of CKD--Hgb of 9.0 on 6/21, has not been rechecked. Stable, iron sat is below goal of >30 and ferritin not high.  She has been started on ferrous sulfate 325 mg bid  - monitor as outpatient  6. Hypertension--Normotensive at this time on metoprolol. Holding diuretics.    Emy Cain MD  [facility]  Department of Medicine  Division of Renal Disease and Hypertension  [phone]     Interval History :   In the last 24 hours Kathryn's creatinine went from 3.9 to 3.7  "mg/dL with 750 ml IV D5 1/2 NS.  She does not have new leg edema nor shortness of breath.    Review of Systems:   (4 pt ROS reviewed alone is not adequate unless you detail systems you reviewed)  I reviewed the following systems:  GI: improving appetite. no nausea or vomiting or diarrhea.   Neuro:  no confusion  Constitutional:  no fever or chills  CV: no dyspnea or edema.  denies chest pain.    Physical Exam:   I/O last 3 completed shifts:  In: 1945 [P.O.:1320; IV Piggyback:625]  Out: 600 [Urine:600]   /64 (BP Location: Left arm)  Pulse 81  Temp 98.8  F (37.1  C) (Oral)  Resp 16  Ht 1.626 m (5' 4\")  Wt 76 kg (167 lb 8 oz)  SpO2 96%  BMI 28.75 kg/m2     GENERAL APPEARANCE: breathing comfortably, no distress  EYES:  no scleral icterus, pupils equal  HENT: mouth without ulcers or lesions  PULM: lungs clear to auscultation bilaterally, equal air movement, no clubbing  CV: no murmur today, no rub, no leg edema  INTEGUMENT: warm, dry, no cyanosis   NEURO:  no asterixis     Labs:   All labs reviewed by me  Electrolytes/Renal -   Recent Labs   Lab Test  06/24/17   0713  06/23/17   0809  06/22/17   0658   06/21/17   0842   05/01/17   0518   04/29/17   1757   10/21/09   0839   NA  146*  145*  148*   < >  146*   < >  146*   < >  145*   < >  141   POTASSIUM  4.8  4.2  4.1   --   4.4   < >  4.7   < >  5.6*   < >  4.0   CHLORIDE  121*  119*  121*   --   113*   < >  122*   < >  120*   < >  113*   CO2  16*  16*  14*   --   18*   < >  12*   < >  14*   < >  25   BUN  86*  91*  105*   --   107*   < >  48*   < >  45*   < >  20   CR  3.79*  3.97*  4.19*   < >  4.26*   < >  1.93*   < >  1.92*   < >  1.05*   GLC  116*  93  57*   --   140*   < >  174*   < >  172*   < >  142*   MALICK  7.9*  7.8*  7.5*   --   7.6*   < >  7.2*   < >  7.5*   < >  8.8   MAG   --   2.0   --    --    --    --   2.2   --   2.1   --    --    PHOS   --   5.2*   --    --   6.0*   --    --    --    --    --   4.2    < > = values in this interval not " displayed.       CBC -   Recent Labs   Lab Test  06/21/17   1536  06/16/17   0615  06/15/17   0527   06/14/17   0551  06/13/17   0516   WBC  6.0   --    --    --   5.9  6.4   HGB  9.0*  9.0*  7.9*   < >  7.6*  8.0*   PLT  254   --    --    --   232  237    < > = values in this interval not displayed.       LFTs -   Recent Labs   Lab Test  06/21/17   0842  06/12/17   1250  05/31/17   1541  05/24/17   1147   ALKPHOS   --   177*  216*  250*   BILITOTAL   --   0.3  0.3  0.3   ALT   --   47  33  43   AST   --   42  21  20   PROTTOTAL   --   6.1*  5.6*  5.8*   ALBUMIN  2.9*  3.1*  2.5*  2.6*       Iron Panel -   Recent Labs   Lab Test  06/21/17   0842  06/14/17   1750  08/20/12   1146   IRON  36  26*  32*   IRONSAT  17  11*  11*   GWEN  196  87   --          Imaging:  All imaging studies reviewed by me.     Current Medications:    dextrose 5%  500 mL Intravenous Once     warfarin-No DOSE today  1 each Does not apply no dose today (warfarin)     ferrous sulfate  325 mg Oral BID     sodium bicarbonate  650 mg Oral BID     insulin aspart  1-7 Units Subcutaneous TID AC     insulin aspart  1-5 Units Subcutaneous At Bedtime     pramipexole  1 mg Oral At Bedtime     pravastatin  40 mg Oral Daily     metoprolol  50 mg Oral BID     isosorbide mononitrate  30 mg Oral Daily     sodium chloride (PF)  3 mL Intracatheter Q8H       Warfarin Therapy Reminder       Emy Cain MD

## 2017-06-24 NOTE — PLAN OF CARE
Problem: Infection, Risk/Actual (Adult)  Goal: Infection Prevention/Resolution  Patient will demonstrate the desired outcomes by discharge/transition of care.   Outcome: No Change  VSS, alert and oriented X 4, denies pain, up in the room with a walker, bolus IVF ordered and infusing, had loose BM's X 2, fair appetite, received Insulin at meal time per SS, INR 3.89 today, no coumadin dose. Continue to monitor, discharge pending on Cr and Phos improvement.

## 2017-06-25 VITALS
HEIGHT: 64 IN | RESPIRATION RATE: 16 BRPM | DIASTOLIC BLOOD PRESSURE: 69 MMHG | BODY MASS INDEX: 28.51 KG/M2 | HEART RATE: 78 BPM | OXYGEN SATURATION: 96 % | WEIGHT: 167 LBS | TEMPERATURE: 99.6 F | SYSTOLIC BLOOD PRESSURE: 135 MMHG

## 2017-06-25 LAB
ANION GAP SERPL CALCULATED.3IONS-SCNC: 11 MMOL/L (ref 3–14)
BACTERIA SPEC CULT: ABNORMAL
BUN SERPL-MCNC: 79 MG/DL (ref 7–30)
CALCIUM SERPL-MCNC: 7.9 MG/DL (ref 8.5–10.1)
CHLORIDE SERPL-SCNC: 122 MMOL/L (ref 94–109)
CO2 SERPL-SCNC: 13 MMOL/L (ref 20–32)
CREAT SERPL-MCNC: 3.84 MG/DL (ref 0.52–1.04)
GFR SERPL CREATININE-BSD FRML MDRD: 11 ML/MIN/1.7M2
GLUCOSE BLDC GLUCOMTR-MCNC: 107 MG/DL (ref 70–99)
GLUCOSE BLDC GLUCOMTR-MCNC: 107 MG/DL (ref 70–99)
GLUCOSE SERPL-MCNC: 169 MG/DL (ref 70–99)
INR PPP: 3.87 (ref 0.86–1.14)
Lab: ABNORMAL
MICRO REPORT STATUS: ABNORMAL
MICROORGANISM SPEC CULT: ABNORMAL
MICROORGANISM SPEC CULT: ABNORMAL
POTASSIUM SERPL-SCNC: 5.1 MMOL/L (ref 3.4–5.3)
SODIUM SERPL-SCNC: 146 MMOL/L (ref 133–144)
SPECIMEN SOURCE: ABNORMAL

## 2017-06-25 PROCEDURE — 00000146 ZZHCL STATISTIC GLUCOSE BY METER IP

## 2017-06-25 PROCEDURE — A9270 NON-COVERED ITEM OR SERVICE: HCPCS | Mod: GY | Performed by: ORTHOPAEDIC SURGERY

## 2017-06-25 PROCEDURE — A9270 NON-COVERED ITEM OR SERVICE: HCPCS | Mod: GY | Performed by: STUDENT IN AN ORGANIZED HEALTH CARE EDUCATION/TRAINING PROGRAM

## 2017-06-25 PROCEDURE — 25000132 ZZH RX MED GY IP 250 OP 250 PS 637: Mod: GY | Performed by: STUDENT IN AN ORGANIZED HEALTH CARE EDUCATION/TRAINING PROGRAM

## 2017-06-25 PROCEDURE — 25000132 ZZH RX MED GY IP 250 OP 250 PS 637: Mod: GY | Performed by: ORTHOPAEDIC SURGERY

## 2017-06-25 PROCEDURE — 36415 COLL VENOUS BLD VENIPUNCTURE: CPT | Performed by: ORTHOPAEDIC SURGERY

## 2017-06-25 PROCEDURE — 99239 HOSP IP/OBS DSCHRG MGMT >30: CPT | Performed by: ORTHOPAEDIC SURGERY

## 2017-06-25 PROCEDURE — 36415 COLL VENOUS BLD VENIPUNCTURE: CPT | Performed by: STUDENT IN AN ORGANIZED HEALTH CARE EDUCATION/TRAINING PROGRAM

## 2017-06-25 PROCEDURE — 85610 PROTHROMBIN TIME: CPT | Performed by: ORTHOPAEDIC SURGERY

## 2017-06-25 PROCEDURE — 80048 BASIC METABOLIC PNL TOTAL CA: CPT | Performed by: STUDENT IN AN ORGANIZED HEALTH CARE EDUCATION/TRAINING PROGRAM

## 2017-06-25 RX ORDER — SODIUM BICARBONATE 650 MG/1
650 TABLET ORAL 3 TIMES DAILY
Qty: 90 TABLET | Refills: 0 | Status: SHIPPED | OUTPATIENT
Start: 2017-06-25 | End: 2017-07-26

## 2017-06-25 RX ORDER — CIPROFLOXACIN 250 MG/1
250 TABLET, FILM COATED ORAL EVERY 12 HOURS
Status: DISCONTINUED | OUTPATIENT
Start: 2017-06-25 | End: 2017-06-25

## 2017-06-25 RX ORDER — SODIUM BICARBONATE 650 MG/1
650 TABLET ORAL 3 TIMES DAILY
Status: DISCONTINUED | OUTPATIENT
Start: 2017-06-25 | End: 2017-06-25 | Stop reason: HOSPADM

## 2017-06-25 RX ORDER — CIPROFLOXACIN 500 MG/1
500 TABLET, FILM COATED ORAL DAILY
Status: DISCONTINUED | OUTPATIENT
Start: 2017-06-25 | End: 2017-06-25 | Stop reason: HOSPADM

## 2017-06-25 RX ORDER — CIPROFLOXACIN 500 MG/1
500 TABLET, FILM COATED ORAL DAILY
Qty: 2 TABLET | Refills: 0 | Status: SHIPPED | OUTPATIENT
Start: 2017-06-26 | End: 2017-06-28

## 2017-06-25 RX ADMIN — METOPROLOL TARTRATE 50 MG: 50 TABLET, FILM COATED ORAL at 09:47

## 2017-06-25 RX ADMIN — SODIUM BICARBONATE 650 MG TABLET 650 MG: at 09:46

## 2017-06-25 RX ADMIN — ISOSORBIDE MONONITRATE 30 MG: 30 TABLET, EXTENDED RELEASE ORAL at 09:46

## 2017-06-25 RX ADMIN — IRON 325 MG: 65 TABLET ORAL at 12:34

## 2017-06-25 RX ADMIN — PRAVASTATIN SODIUM 40 MG: 40 TABLET ORAL at 09:46

## 2017-06-25 RX ADMIN — CIPROFLOXACIN HYDROCHLORIDE 500 MG: 500 TABLET, FILM COATED ORAL at 09:47

## 2017-06-25 NOTE — PLAN OF CARE
Problem: Goal Outcome Summary  Goal: Goal Outcome Summary  Outcome: Improving  [id number] Pt A&O, VSS on RA, up to bedside commode overnight with SBA.  No complaints of pain or SOB.  Tolerating diet with good appetite.  BG remain stable.  Voiding without difficulties.  No BM overnight.  PIV saline locked following D5 bolus given.  Small skin tears on L shin and R ankle are covered with primapore and are CDI.  Plan to have labs rechecked this AM including phos level.  Potential discharge today if labs are okay.  Pt is able to call and make needs known.  Will continue to monitor and follow POC.

## 2017-06-25 NOTE — DISCHARGE SUMMARY
[facility] Discharge Summary   Date of Service: 6/25/2017                                Kathryn Banks MRN# [id number]   YOB: 1940 Age: 77 year old      Date of Admission:  6/21/2017  Date of Discharge:  6/25/2017  1:16 PM  Discharging Physician: Javan Sheikh MD (Contact: [phone])  Discharging Service:  Medicine  Hospitalization Status: Inpatient    Discharge Diagnosis:   1. Multi-factorial acute kidney injury on CKD  2. Hypernatremia  3. Non-anion gap metabolic acidosis  4. Hypertension  5. DM II, well-controlled  6. Chronic diastolic heart failure  7. Anemia of CKD  8. Uncomplicated urinary tract infection  9. History of atrial flutter s/p ablation    HPI:   Kathryn Banks is a 77 year old female  who has a history of CAD s/p CAGB in 2007, Atrial Flutter s/p Ablation (6/7/17), and multiple recent admissions for diastolic heart failure and is admitted for acute kidney injury on chronic kidney disease.        Patient has been admitted multiple times including 5/31/17-6/6/17, 6/12/16-6/16/17 for acute on chronic heart failure, and her creatinine increased with each hospitalization (2.29-> 3.10->2.39) then the following hospitalization (2.49->4.03). After this most recent hospitalization the patient was thought to have developed ATN secondary to aggressive diuresis (FENa was 14.3%, random urine protein 1.3 g/g creatinine).        Since that hospitalization the patient states that she has been doing well. She has been taking her bumex, and has been urinating regularly. She states that she has had small amounts of blood in the toilet (though she thinks it is from her hemorrhoids rather than from her urine). She denies any increased urinary frequency, urgency, or burning. Her weight has been stable since her hospitalization (160-165), and she denies any lower extremity swelling or shortness of breath. She does have chronic diarrhea, but denies any nausea or vomiting. She  "has been eating and drinking normally. She has otherwise been feeling well and denies any additional symptoms including weakness, confusion.     Hospital course:   1. Multi-factorial acute kidney injury on CKD  2. Hypernatremia  3. Non-anion gap metabolic acidosis  Based on her history and pattern of recurrent MARTÍN, a diagnosis of pre-renal injury was favored. Her diuretics were held and she was given cautious amounts of IVF throughout her hospital stay. During this time, her weight and clinica status remained largely unchanged and her renal function improved slightly. She was seen by nephrology who felt that she may be having slowly resolving ATN vs other cause of recurrent MARTÍN. She was discharged with a new rx for oral sodium bicarbonate and continued to hold her diuretics until further trends of creat are available or she develops fluid retention and signs/symptoms of increased CHF.     Discharge Disposition:   Home with PT referral. She was admitt from TCU but then after PT evaluation, was noted to be safe for home discharge with continued PT. She will have  and 2 friends in home for next several weeks to help  Discharge Exam:  /69 (BP Location: Right arm)  Pulse 78  Temp 99.6  F (37.6  C)  Resp 16  Ht 1.626 m (5' 4\")  Wt 75.8 kg (167 lb)  SpO2 96%  BMI 28.67 kg/m2  Looking well, talktive and in NAD  Lungs are clear, heart is RRR with soft systolic murmur  Abn i soft and NT  There is non-pitting edema of the LE           Discharge Medications:     Current Discharge Medication List      START taking these medications    Details   sodium bicarbonate 650 MG tablet Take 1 tablet (650 mg) by mouth 3 times daily  Qty: 90 tablet, Refills: 0    Associated Diagnoses: Stage 4 chronic kidney disease (H)      ciprofloxacin (CIPRO) 500 MG tablet Take 1 tablet (500 mg) by mouth daily for 2 doses  Qty: 2 tablet, Refills: 0    Associated Diagnoses: Acute cystitis without hematuria         CONTINUE these " medications which have NOT CHANGED    Details   warfarin (COUMADIN) 2.5 MG tablet Take 1 tablet (2.5 mg) daily until INR is rechecked within 3 days  Qty: 10 tablet, Refills: 0    Associated Diagnoses: Atrial fibrillation with rapid ventricular response (H); Long-term (current) use of anticoagulants      isosorbide mononitrate (IMDUR) 30 MG 24 hr tablet Take 1 tablet (30 mg) by mouth daily    Associated Diagnoses: Hx of non-ST elevation myocardial infarction (NSTEMI)      gabapentin (NEURONTIN) 300 MG capsule Take 1 capsule (300 mg) by mouth At Bedtime    Associated Diagnoses: Diabetic polyneuropathy associated with type 2 diabetes mellitus (H)      pravastatin (PRAVACHOL) 40 MG tablet Take 1 tablet (40 mg) by mouth daily  Qty: 30 tablet    Associated Diagnoses: Hx of non-ST elevation myocardial infarction (NSTEMI); Atherosclerosis of native coronary artery of native heart without angina pectoris      pramipexole (MIRAPEX) 0.5 MG tablet Take 3 tablets (1.5 mg) by mouth every evening    Associated Diagnoses: Restless legs syndrome (RLS)      metoprolol (LOPRESSOR) 25 MG tablet Take 2 tablets (50 mg) by mouth 2 times daily  Qty: 60 tablet, Refills: 0    Associated Diagnoses: Atrial fibrillation with rapid ventricular response (H)      ferrous sulfate (IRON) 325 (65 FE) MG tablet Take 1 tablet (325 mg) by mouth daily (with breakfast)  Qty: 100 tablet    Associated Diagnoses: Iron deficiency anemia secondary to inadequate dietary iron intake      calcium carbonate-vitamin D 500-400 MG-UNIT TABS per tablet Take 1 tablet by mouth daily    Associated Diagnoses: Closed intertrochanteric fracture of left hip, initial encounter      cyanocobalamin (VITAMIN B12) 1000 MCG/ML injection Inject 1 mL (1,000 mcg) into the muscle every 30 days  Qty: 1 mL, Refills: 11    Associated Diagnoses: Vitamin B12 deficiency (non anemic)      acetaminophen (TYLENOL) 325 MG tablet Take 2 tablets (650 mg) by mouth every 4 hours as needed for mild  pain  Qty: 100 tablet    Associated Diagnoses: Acute on chronic renal failure (H)      nitroglycerin (NITROSTAT) 0.4 MG sublingual tablet Place 1 tablet (0.4 mg) under the tongue every 5 minutes as needed for chest pain  Qty: 25 tablet, Refills: 0    Associated Diagnoses: Hx of non-ST elevation myocardial infarction (NSTEMI); Atherosclerosis of native coronary artery of native heart without angina pectoris; Postsurgical aortocoronary bypass status      ORDER FOR DME Equipment being ordered: cpap machine, mask, humidifier, tubing and filters  Qty: 1 Device, Refills: 0    Associated Diagnoses: ANABEL (obstructive sleep apnea)         STOP taking these medications       bumetanide (BUMEX) 2 MG tablet Comments:   Reason for Stopping:         metolazone (ZAROXOLYN) 5 MG tablet Comments:   Reason for Stopping:                    Discharge Instructions and Follow-Up:     Discharge Procedure Orders  Physical Therapy Referral   Referral Type: Rehab Therapy Physical Therapy     Reason for your hospital stay   Order Comments: You were hospitalized for concerning changes in your kidney function. You kidney function remains severe reduced but did improve with holding your diuretics and giving you some intravenous fluids     Adult [facility] Follow-up and recommended labs and tests   Order Comments: Follow up with primary care provider, Dheeraj Jj, within 7 days to evaluate medication change and for hospital follow- up.  No follow up labs or test are needed.    Follow up with your kidney specialist within 2-4 weeks  for hospital follow- up. You should be expecting a call from you kidney doctor about scheduling weekly lab checks until your kidney function has stabilized. The following labs/tests are recommended: Renal panel.    Appointments on [place] and/or [facility] (with [facility] or [facility] provider or service). Call [phone] if you haven't heard regarding these appointments within 7 days of discharge.     Activity   Order  Comments: Your activity upon discharge: You have no specific activity restrictions but will need to be cautious to avoid falls. While you have regained much strength, you remain with some balance problems and should not be home alone for prolonged periods of time   Order Specific Question Answer Comments   Is discharge order? Yes      Diet   Order Comments: Follow this diet upon discharge: Your diet should be low in salt, no more than 2-3 grams daily.   Order Specific Question Answer Comments   Is discharge order? Yes         Greater than 30 minutes was spent in direct patient counseling and coordination of care. Please do not hesitate to contact me with any additional questions.     Stewart Sheikh MD

## 2017-06-26 ENCOUNTER — CARE COORDINATION (OUTPATIENT)
Dept: CARE COORDINATION | Facility: CLINIC | Age: 77
End: 2017-06-26

## 2017-06-26 ENCOUNTER — TELEPHONE (OUTPATIENT)
Dept: INTERNAL MEDICINE | Facility: CLINIC | Age: 77
End: 2017-06-26

## 2017-06-26 NOTE — PROGRESS NOTES
"[facility]  \"Hello, my name is Megan Bauman , and I am calling from the [facility].  I want to check in and see how you are doing, after leaving the hospital.  You may also receive a call from your Care Coordinator (care team), but I want to make sure you don t have any urgent needs.  I have a couple questions to review with you:     Post-Discharge Outreach                                                    Kathryn Banks is a 77 year old female     Follow-up Appointments           Adult [facility] Follow-up and recommended labs and tests         Follow up with primary care provider, Dheeraj Jj, within 7 days to evaluate medication change and for hospital follow- up.  No follow up labs or test are needed.    Follow up with your kidney specialist within 2-4 weeks  for hospital follow- up. You should be expecting a call from you kidney doctor about scheduling weekly lab checks until your kidney function has stabilized. The following labs/tests are recommended: Renal panel.     Appointments on [place] and/or [facility] (with [facility] or [facility] provider or service). Call [phone] if you haven't heard regarding these appointments within 7 days of discharge.                        Your next 10 appointments already scheduled            Jul 27, 2017 11:00 AM CDT   Return Visit with Thaddeus King MD   [facility] ([address] [address] [phone]                      Care Team:    Patient Care Team       Relationship Specialty Notifications Start End    Dheeraj Jj MD PCP - General Internal Medicine  10/28/10     Phone: [phone] Fax: [phone]         [address] [address]    Arash Olsen DO MD Orthopedics  1/20/14     Phone: [phone] Fax: [phone]         [address] " [address]    Juanita Larsen, RN Clinic Care Coordinator  Admissions 5/17/17     Phone: [phone] Fax: [phone]                Transition of Care Review                                                      Patient was called three times and no answer so post 24 hr DC follow up calls will be closed out               Next 5 appointments (look out 90 days)     Jul 27, 2017 11:00 AM CDT   [facility] Visit with Thaddeus King MD   [facility]    [address] [place] [id number] [phone]                      Plan                                                      Thanks for your time.  Your Care Coordinator may follow-up within the next couple days.  In the meantime if you have questions, concerns or problems call your care team.        Megan Bauman

## 2017-06-26 NOTE — PROGRESS NOTES
"Clinic Care Coordination Contact    Referral Source: CTS    Clinical Data: Patient was hospitalized at the [facility] from 6/21 to 6/25 with diagnosis of Multi-factorial acute kidney injury on CKD    Home Care Contact:   Home Care Agency: [facility]   Contact name () and phone number: Karly ROSARIO [phone]    Care Coordination contacted home care: Yes   Anticipated start of care date: 6/28    Patient Contact:     Introduced self and role of care coordination.    Discharge instructions were reviewed with patient/caregiver.    Do you have any questions about your medications? No pt states she does have a new pill dispenser and will have the Home Care RN help her with it   Follow up appointment is scheduled for will send message to provider for hospital follow up work in.   Provided 24 Hour Nurse Line and/or 24 Hour Appointment Scheduling: Yes: 24 Hour Nurse Line - [phone], 24 Hour Appointment Scheduling - [phone]   Home care has contacted patient: No, let pt know I did speak with home care and they will be calling today or tomorrow   Patient questions/concerns: Pt states she is home and feeling better, however, she is pretty frustrated with all the \"moving of rooms\" while she was inpatient.  Sounds like she was moved several                 times in one night due to diarrhea and needing private room then moved again two more times without understanding why. She will wait for her follow up call from the Hospital and discuss this with                    them.  Also discussed pt will be starting tele-health program to help monitor her health and to be expecting that call to get her started. Advised I will be monitoring for variances and calling her to                         discuss any concerns.        Plan: RN/SW Care Coordinator will await notification from home care staff informing RN/SW Care Coordinator of patients discharge plans/needs. RN/SW Care Coordinator " will review chart and outreach to home care every 4 weeks and as needed or per daily variances.     Meenakshi Larsen RN, BSN  Care Coordination    [facility]  [address] [place] [address]  Office: [phone]  Fax [phone]   [facility] [address] [place] [address]  Office: [phone]  Fax [phone]   [email or web address]   [email or web address]     Connect with [facility] on social media.

## 2017-06-26 NOTE — TELEPHONE ENCOUNTER
Home Care Notified.     Meenakshi Larsen RN, BSN  Care Coordination    [facility]  [address] [place] [address]  Office: [phone]  Fax [phone]   [facility] [address] [place] [address]  Office: [phone]  Fax [phone]   [email or web address]   [email or web address]     Connect with [facility] on social media.

## 2017-06-26 NOTE — TELEPHONE ENCOUNTER
Reason for Call:  Home Health Care    Argenis with [facility] called regarding (reason for call): orders    Orders are needed for this patient. Resume HC orders    PT:     OT:     Skilled Nursing:     Pt Provider:     Phone Number Homecare Nurse can be reached at:     Can we leave a detailed message on this number? YES    Phone number patient can be reached at:     Best Time:     Call taken on 6/26/2017 at 10:22 AM by Amanda Saba

## 2017-06-27 ENCOUNTER — CARE COORDINATION (OUTPATIENT)
Dept: NEPHROLOGY | Facility: CLINIC | Age: 77
End: 2017-06-27

## 2017-06-27 NOTE — PROGRESS NOTES
Received message from inpatient nephrologist, Dr. Cain that patient discharged to home (no longer needs care facility). While inpatient, she was started on Iron 325 mg BID and her sodium bicarbonate increased to 1950 mg BID. She was also discontinued off of diuretic and will need close monitoring when to resume diuretic. A weekly nurse visit with labs was suggested.     Left message for Kathryn to return call to discuss this and coordinate appointments.    Arlet Webb RN, BSN  Nephrology Care Coordinator  [facility]

## 2017-06-27 NOTE — PROGRESS NOTES
Called pt, she states she received a call to reschedule her appt for tomorrow. Pt will attend appt tomorrow with PCP.     Meenakshi Larsen RN, BSN  Care Coordination    [address] [address]  Office: [phone]  Fax [phone]   [email or web address]   [email or web address]     Connect with [facility] on social media.

## 2017-06-28 ENCOUNTER — TELEPHONE (OUTPATIENT)
Dept: INTERNAL MEDICINE | Facility: CLINIC | Age: 77
End: 2017-06-28

## 2017-06-28 ENCOUNTER — OFFICE VISIT (OUTPATIENT)
Dept: INTERNAL MEDICINE | Facility: CLINIC | Age: 77
End: 2017-06-28
Payer: COMMERCIAL

## 2017-06-28 ENCOUNTER — TELEPHONE (OUTPATIENT)
Dept: NEPHROLOGY | Facility: CLINIC | Age: 77
End: 2017-06-28

## 2017-06-28 ENCOUNTER — ANTICOAGULATION THERAPY VISIT (OUTPATIENT)
Dept: ANTICOAGULATION | Facility: CLINIC | Age: 77
End: 2017-06-28
Payer: COMMERCIAL

## 2017-06-28 VITALS
WEIGHT: 174 LBS | OXYGEN SATURATION: 98 % | TEMPERATURE: 97.9 F | BODY MASS INDEX: 29.87 KG/M2 | SYSTOLIC BLOOD PRESSURE: 94 MMHG | DIASTOLIC BLOOD PRESSURE: 50 MMHG | HEART RATE: 102 BPM

## 2017-06-28 DIAGNOSIS — I25.10 ATHEROSCLEROSIS OF NATIVE CORONARY ARTERY OF NATIVE HEART WITHOUT ANGINA PECTORIS: ICD-10-CM

## 2017-06-28 DIAGNOSIS — I48.20 CHRONIC ATRIAL FIBRILLATION (H): ICD-10-CM

## 2017-06-28 DIAGNOSIS — Z79.01 LONG-TERM (CURRENT) USE OF ANTICOAGULANTS: ICD-10-CM

## 2017-06-28 DIAGNOSIS — I89.0 LYMPHEDEMA: ICD-10-CM

## 2017-06-28 DIAGNOSIS — E11.59 TYPE 2 DIABETES MELLITUS WITH OTHER CIRCULATORY COMPLICATIONS (H): ICD-10-CM

## 2017-06-28 DIAGNOSIS — N18.4 ACUTE RENAL FAILURE SUPERIMPOSED ON STAGE 4 CHRONIC KIDNEY DISEASE, UNSPECIFIED ACUTE RENAL FAILURE TYPE (H): Primary | ICD-10-CM

## 2017-06-28 DIAGNOSIS — N17.9 ACUTE RENAL FAILURE SUPERIMPOSED ON STAGE 4 CHRONIC KIDNEY DISEASE, UNSPECIFIED ACUTE RENAL FAILURE TYPE (H): Primary | ICD-10-CM

## 2017-06-28 DIAGNOSIS — D50.9 IRON DEFICIENCY ANEMIA, UNSPECIFIED IRON DEFICIENCY ANEMIA TYPE: ICD-10-CM

## 2017-06-28 DIAGNOSIS — I25.2 HX OF NON-ST ELEVATION MYOCARDIAL INFARCTION (NSTEMI): ICD-10-CM

## 2017-06-28 LAB
ANION GAP SERPL CALCULATED.3IONS-SCNC: 13 MMOL/L (ref 3–14)
BUN SERPL-MCNC: 65 MG/DL (ref 7–30)
CALCIUM SERPL-MCNC: 7.1 MG/DL (ref 8.5–10.1)
CHLORIDE SERPL-SCNC: 120 MMOL/L (ref 94–109)
CO2 SERPL-SCNC: 13 MMOL/L (ref 20–32)
CREAT SERPL-MCNC: 3.74 MG/DL (ref 0.52–1.04)
ERYTHROCYTE [DISTWIDTH] IN BLOOD BY AUTOMATED COUNT: 18.5 % (ref 10–15)
GFR SERPL CREATININE-BSD FRML MDRD: 12 ML/MIN/1.7M2
GLUCOSE SERPL-MCNC: 230 MG/DL (ref 70–99)
HCT VFR BLD AUTO: 28.3 % (ref 35–47)
HGB BLD-MCNC: 8.2 G/DL (ref 11.7–15.7)
INR PPP: 3.4
MCH RBC QN AUTO: 23 PG (ref 26.5–33)
MCHC RBC AUTO-ENTMCNC: 29 G/DL (ref 31.5–36.5)
MCV RBC AUTO: 79 FL (ref 78–100)
PLATELET # BLD AUTO: 273 10E9/L (ref 150–450)
POTASSIUM SERPL-SCNC: 5.3 MMOL/L (ref 3.4–5.3)
RBC # BLD AUTO: 3.57 10E12/L (ref 3.8–5.2)
SODIUM SERPL-SCNC: 146 MMOL/L (ref 133–144)
WBC # BLD AUTO: 6.8 10E9/L (ref 4–11)

## 2017-06-28 PROCEDURE — 36415 COLL VENOUS BLD VENIPUNCTURE: CPT | Performed by: INTERNAL MEDICINE

## 2017-06-28 PROCEDURE — 99495 TRANSJ CARE MGMT MOD F2F 14D: CPT | Performed by: INTERNAL MEDICINE

## 2017-06-28 PROCEDURE — 99207 ZZC NO CHARGE NURSE ONLY: CPT | Performed by: INTERNAL MEDICINE

## 2017-06-28 PROCEDURE — 85027 COMPLETE CBC AUTOMATED: CPT | Performed by: INTERNAL MEDICINE

## 2017-06-28 PROCEDURE — 80048 BASIC METABOLIC PNL TOTAL CA: CPT | Performed by: INTERNAL MEDICINE

## 2017-06-28 RX ORDER — PRAVASTATIN SODIUM 40 MG
40 TABLET ORAL DAILY
Qty: 90 TABLET | Refills: 3 | Status: ON HOLD | OUTPATIENT
Start: 2017-06-28 | End: 2017-08-18

## 2017-06-28 ASSESSMENT — PAIN SCALES - GENERAL: PAINLEVEL: NO PAIN (0)

## 2017-06-28 NOTE — NURSING NOTE
"Chief Complaint   Patient presents with     Hospital F/U       Initial BP 94/50  Pulse 102  Temp 97.9  F (36.6  C) (Temporal)  Wt 174 lb (78.9 kg)  SpO2 98%  BMI 29.87 kg/m2 Estimated body mass index is 29.87 kg/(m^2) as calculated from the following:    Height as of 6/24/17: 5' 4\" (1.626 m).    Weight as of this encounter: 174 lb (78.9 kg).  Medication Reconciliation: complete    "

## 2017-06-28 NOTE — TELEPHONE ENCOUNTER
Kathryn returns call and message below is relayed.  Pt states understanding and had no other questions or concerns.

## 2017-06-28 NOTE — TELEPHONE ENCOUNTER
[facility] Call Center    Phone Message    Name of Caller: MARI HERNANDEZ    Phone Number: Home number on file [phone] (home)    Best time to return call: TODAY or TOMORROW    May a detailed message be left on voicemail: yes    Relation to patient: Self    Reason for Call: Other: Pt was discharged from hospital. Needs f/u appt in the next few weeks.  Cannot do an early appt. Please call.      Action Taken: Message routed to:  Adult Clinics: Nephrology p [phone]

## 2017-06-28 NOTE — MR AVS SNAPSHOT
"              After Visit Summary   2017    Kathryn Banks    MRN: [id number]           Patient Information     Date Of Birth          1940        Visit Information        Provider Department      2017 2:15 PM Dheeraj Jj MD [facility]         Follow-ups after your visit        Your next 10 appointments already scheduled     2017 11:00 AM CDT   Return Visit with Thaddeus King MD   [facility] ([facility])    [address] [place] [address] [id number] [phone]              Who to contact     If you have questions or need follow up information about today's clinic visit or your schedule please contact [facility] directly at [phone].  Normal or non-critical lab and imaging results will be communicated to you by MyChart, letter or phone within 4 business days after the clinic has received the results. If you do not hear from us within 7 days, please contact the clinic through MyChart or phone. If you have a critical or abnormal lab result, we will notify you by phone as soon as possible.  Submit refill requests through [employer] or call your pharmacy and they will forward the refill request to us. Please allow 3 business days for your refill to be completed.          Additional Information About Your Visit        MyChart Information     [employer] lets you send messages to your doctor, view your test results, renew your prescriptions, schedule appointments and more. To sign up, go to [email or web address] . Click on \"Log in\" on the left side of the screen, which will take you to the Welcome page. Then click on \"Sign up Now\" on the right side of the page.     You will be asked to enter the access code listed below, as well as some personal information. Please follow the directions to create your username and password.     Your access code is: [email or web address]  Expires: 2017 11:18 AM     Your access code will  in 90 " days. If you need help or a new code, please call your [place] clinic or [phone].        Care EveryWhere ID     This is your Care EveryWhere ID. This could be used by other organizations to access your [place] medical records  [id number]        Your Vitals Were     Pulse Temperature Pulse Oximetry BMI (Body Mass Index)          102 97.9  F (36.6  C) (Temporal) 98% 29.87 kg/m2         Blood Pressure from Last 3 Encounters:   06/28/17 94/50   06/25/17 135/69   06/21/17 123/71    Weight from Last 3 Encounters:   06/28/17 174 lb (78.9 kg)   06/25/17 167 lb (75.8 kg)   06/21/17 166 lb 9.6 oz (75.6 kg)              Today, you had the following     No orders found for display         Today's Medication Changes          These changes are accurate as of: 6/28/17  2:32 PM.  If you have any questions, ask your nurse or doctor.               These medicines have changed or have updated prescriptions.        Dose/Directions    warfarin 2.5 MG tablet   Commonly known as:  COUMADIN   This may have changed:  additional instructions   Used for:  Atrial fibrillation with rapid ventricular response (H), Long-term (current) use of anticoagulants        Take 1 tablet (2.5 mg) daily until INR is rechecked within 3 days   Quantity:  10 tablet   Refills:  0                Primary Care Provider Office Phone # Fax #    Dheeraj Jj [phone]       [facility] [address] [place] [address]        Equal Access to Services     CAROLINE BLAND AH: Hadii aad ku hadasho Sojeannetteali, waaxda luqadaha, qaybta kaalmada shayan, tay murillo. So [facility] [phone].    ATENCIÓN: Si habla español, tiene a medrano disposición servicios gratuitos de asistencia lingüística. Llame al [phone].    We comply with applicable federal civil rights laws and [place] laws. We do not discriminate on the basis of race, color, national origin, age, disability sex, sexual orientation or gender  identity.            Thank you!     Thank you for choosing [facility]  for your care. Our goal is always to provide you with excellent care. Hearing back from our patients is one way we can continue to improve our services. Please take a few minutes to complete the written survey that you may receive in the mail after your visit with us. Thank you!             Your Updated Medication List - Protect others around you: Learn how to safely use, store and throw away your medicines at www.disposemymeds.org.          This list is accurate as of: 6/28/17  2:32 PM.  Always use your most recent med list.                   Brand Name Dispense Instructions for use Diagnosis    acetaminophen 325 MG tablet    TYLENOL    100 tablet    Take 2 tablets (650 mg) by mouth every 4 hours as needed for mild pain    Acute on chronic renal failure (H)       calcium carbonate-vitamin D 500-400 MG-UNIT Tabs per tablet      Take 1 tablet by mouth daily    Closed intertrochanteric fracture of left hip, initial encounter       ciprofloxacin 500 MG tablet    CIPRO    2 tablet    Take 1 tablet (500 mg) by mouth daily for 2 doses    Acute cystitis without hematuria       cyanocobalamin 1000 MCG/ML injection    VITAMIN B12    1 mL    Inject 1 mL (1,000 mcg) into the muscle every 30 days    Vitamin B12 deficiency (non anemic)       ferrous sulfate 325 (65 FE) MG tablet    IRON    100 tablet    Take 1 tablet (325 mg) by mouth daily (with breakfast)    Iron deficiency anemia secondary to inadequate dietary iron intake       gabapentin 300 MG capsule    NEURONTIN     Take 1 capsule (300 mg) by mouth At Bedtime    Diabetic polyneuropathy associated with type 2 diabetes mellitus (H)       isosorbide mononitrate 30 MG 24 hr tablet    IMDUR     Take 1 tablet (30 mg) by mouth daily    Hx of non-ST elevation myocardial infarction (NSTEMI)       metoprolol 25 MG tablet    LOPRESSOR    60 tablet    Take 2 tablets (50 mg) by mouth 2 times daily     Atrial fibrillation with rapid ventricular response (H)       nitroGLYcerin 0.4 MG sublingual tablet    NITROSTAT    25 tablet    Place 1 tablet (0.4 mg) under the tongue every 5 minutes as needed for chest pain    Hx of non-ST elevation myocardial infarction (NSTEMI), Atherosclerosis of native coronary artery of native heart without angina pectoris, Postsurgical aortocoronary bypass status       order for DME     1 Device    Equipment being ordered: cpap machine, mask, humidifier, tubing and filters    ANABEL (obstructive sleep apnea)       pramipexole 0.5 MG tablet    MIRAPEX     Take 3 tablets (1.5 mg) by mouth every evening    Restless legs syndrome (RLS)       pravastatin 40 MG tablet    PRAVACHOL    30 tablet    Take 1 tablet (40 mg) by mouth daily    Hx of non-ST elevation myocardial infarction (NSTEMI), Atherosclerosis of native coronary artery of native heart without angina pectoris       sodium bicarbonate 650 MG tablet     90 tablet    Take 1 tablet (650 mg) by mouth 3 times daily    Stage 4 chronic kidney disease (H)       warfarin 2.5 MG tablet    COUMADIN    10 tablet    Take 1 tablet (2.5 mg) daily until INR is rechecked within 3 days    Atrial fibrillation with rapid ventricular response (H), Long-term (current) use of anticoagulants

## 2017-06-28 NOTE — TELEPHONE ENCOUNTER
----- Message from Dheeraj Jj MD sent at 6/28/2017  4:03 PM CDT -----  Labs are slightly better, repeat a comprehensive panel and cbc in one week.

## 2017-06-28 NOTE — TELEPHONE ENCOUNTER
Spoke to Kathryn. This writer had previously left her a message yesterday:    Received message from inpatient nephrologist, Dr. Cain that patient discharged to home (no longer needs care facility). While inpatient, she was started on Iron 325 mg BID and her sodium bicarbonate increased to 1950 mg BID. She was also discontinued off of diuretic and will need close monitoring when to resume diuretic. A weekly nurse visit with labs was suggested.      Left message for Kathryn to return call to discuss this and coordinate appointments.     Proceeded to schedule Kathryn for a follow up with Dr. Ford on July 11 at 3:30. Patient was seen by Dr. Jj, her PCP today and had a bmp drawn. Unsure if a nurse visit would still be necessary this week. Will route to Dr. Barfield to advise.    Arlet Webb, RN, BSN  Nephrology Care Coordinator  [facility]

## 2017-06-28 NOTE — MR AVS SNAPSHOT
Kathryn WEEKS Jocelyn   6/28/2017   Anticoagulation Therapy Visit    Description:  77 year old female   Provider:  Dheeraj Jj MD   Department:  Ph Anticoag           INR as of 6/28/2017     Today's INR 3.4!      Anticoagulation Summary as of 6/28/2017     INR goal 2.0-3.0   Today's INR 3.4!   Full instructions 6/28: 2.5 mg; 6/29: 2.5 mg; 6/30: 5 mg; 7/1: 2.5 mg; Otherwise 7.5 mg on Fri; 5 mg all other days   Next INR check 7/3/2017    Indications   Long-term (current) use of anticoagulants [Z79.01] [Z79.01]  Atrial fibrillation (H) (Resolved) [I48.91]  Paroxysmal atrial fibrillation (H) (Resolved) [I48.0]         Contact Numbers     Clinic Number:         June 2017 Details    Sun Mon Tue Wed Thu Fri Sat         1               2               3                 4               5               6               7               8               9               10                 11               12               13               14               15               16               17                 18               19               20               21               22               23               24                 25               26               27               28      2.5 mg   See details      29      2.5 mg         30      5 mg           Date Details   06/28 This INR check               How to take your warfarin dose     To take:  2.5 mg Take 1 of the 2.5 mg tablets.    To take:  5 mg Take 2 of the 2.5 mg tablets.           July 2017 Details    Sun Mon Tue Wed Thu Fri Sat           1      2.5 mg           2      5 mg         3            4               5               6               7               8                 9               10               11               12               13               14               15                 16               17               18               19               20               21               22                 23               24               25               26                27               28               29                 30               31                     Date Details   No additional details    Date of next INR:  7/3/2017         How to take your warfarin dose     To take:  2.5 mg Take 1 of the 2.5 mg tablets.    To take:  5 mg Take 2 of the 2.5 mg tablets.

## 2017-06-28 NOTE — TELEPHONE ENCOUNTER
Reason for Call:  INR    Who is calling?  Home Care:    Phone number:  [phone]    Fax number:      Name of caller: Leona - would like a call back as soon as possible. Is waiting to set up meds.     INR Value:  3.4    Are there any other concerns:  No    Can we leave a detailed message on this number? YES    Phone number patient can be reached at: Home number on file [phone] (home)      Call taken on 6/28/2017 at 9:52 AM by Sienna Lafleur

## 2017-06-28 NOTE — PROGRESS NOTES
SUBJECTIVE:                                                    Kathryn Banks is a 77 year old female who presents to clinic today for the following health issues:      Hospital Follow-up Visit:    Hospital/Nursing Home/IP Rehab Facility: [facility]  Date of Admission: 6/21/17  Date of Discharge: 6/25/17  Reason(s) for Admission: multi-factorial acute kidney injury on CKD            Problems taking medications regularly:  None       Medication changes since discharge: None       Problems adhering to non-medication therapy:  None    Summary of hospitalization:  [facility] discharge summary reviewed  Diagnostic Tests/Treatments reviewed.  Follow up needed: Patient needs to take her blood sugar  Other Healthcare Providers Involved in Patient s Care:         Homecare  Update since discharge: improved.     Post Discharge Medication Reconciliation: discharge medications reconciled and changed, per note/orders (see AVS).  Plan of care communicated with patient and family            Some issues on her medications at discharge.      Been home for 3 days, they stopped her diuretics.  Stress with her granddaughter overdosing on pills.  Denies sob, on wheezing right now, weight is up some from discharge.  Swelling isn't too bad.  Cipro is now done.      Not on any medications for diabetes, glipizide was held in the hospital, not checking sugars.  No idea what diabetes is doing now.     Sodium bicarbonate was added.    Coumadin, was low for quite a few days.      Past Medical History:   Diagnosis Date     Carpal tunnel syndrome      Coronary atherosclerosis of native coronary artery 2006    5 vessel CABG     OBSTRUCTIVE SLEEP APNEA     using CPAP     Osteoarthrosis, unspecified whether generalized or localized, unspecified site     generalized arthritis, particularly in her hands and feet         Other and combined forms of senile cataract 2000    Bilateral     Other and unspecified  hyperlipidemia      RESTLESS LEGS SYNDROME      TENOSYNOV HAND/WRIST NEC 6/30/2006     Type II or unspecified type diabetes mellitus without mention of complication, not stated as uncontrolled 2001    Diabetes mellitus/pt is diet controlled with weight loss     Typical atrial flutter (H) 01/17/2007     Unspecified essential hypertension      Current Outpatient Prescriptions   Medication     pravastatin (PRAVACHOL) 40 MG tablet     sodium bicarbonate 650 MG tablet     warfarin (COUMADIN) 2.5 MG tablet     acetaminophen (TYLENOL) 325 MG tablet     isosorbide mononitrate (IMDUR) 30 MG 24 hr tablet     nitroglycerin (NITROSTAT) 0.4 MG sublingual tablet     gabapentin (NEURONTIN) 300 MG capsule     pramipexole (MIRAPEX) 0.5 MG tablet     metoprolol (LOPRESSOR) 25 MG tablet     ferrous sulfate (IRON) 325 (65 FE) MG tablet     calcium carbonate-vitamin D 500-400 MG-UNIT TABS per tablet     cyanocobalamin (VITAMIN B12) 1000 MCG/ML injection     ciprofloxacin (CIPRO) 500 MG tablet     [DISCONTINUED] pravastatin (PRAVACHOL) 40 MG tablet     ORDER FOR DME     No current facility-administered medications for this visit.      Social History   Substance Use Topics     Smoking status: Former Smoker     Years: 10.00     Quit date: 1/1/1969     Smokeless tobacco: Never Used     Alcohol use 0.0 oz/week     0 Standard drinks or equivalent per week      Comment: occasional- 2 drinks per month     Review of Systems  Constitutional-Weight gain.  ENT-No earpain, sore throat, voice changes or rhinitis.  Cardiac-No chest pain or palpitations and Exertional SOB.  Respiratory-SOB.  GI-No nausea, vomitting, diarrhea, constipation, or blood in the stool.  Musculoskeletal-No muscles aches or joint pains.  Endocrine-Weight gain.    Physical Exam  BP 94/50  Pulse 102  Temp 97.9  F (36.6  C) (Temporal)  Wt 174 lb (78.9 kg)  SpO2 98%  BMI 29.87 kg/m2  General Appearance-alert, no distress  Cardiac-irregularly irregular rhythm  Lungs-clear to  auscultation  Extremities-2+ pitting edema both lower ankles up to the knees with 1 lesion weeping on the left leg, no cellulitis  Skin-chronic skin changes from swelling    ASSESSMENT:  Patient who is here for posthospital check. She was in the hospital for 4 days. Discharged on Sunday she's been home for 3 days. She has been stable. Her breathing is stable. Current heart scale her weight is up but she is not weighing herself at home. Her swelling is better than it has been in the past but is still present at 2+ pitting edema in her lower legs. She currently has no active infection.    Diabetes is difficult to know if she is not checking her blood sugars. Her glipizide was held due to her acute renal failure. I cannot restart her medication as I don't know what her blood sugars are. She is instructed to check her blood sugars at least twice a day and report them to me. We may have to use insulin as she is difficult to treat with the renal failure.    For now we will get her back with nephrology and hold on any diuretics. I have sent staff message to her nephrologist. I will recheck her basic metabolic panel and a CBC today.    She continues on anticoagulation for her atrial fibrillation. On my examination she has an irregularly irregular rhythm she has had an ablation for A. fib in the past and will be seeing cardiology her electrophysiologist in the next 2 weeks.    Electronically signed by Dheeraj Jj MD

## 2017-06-28 NOTE — PROGRESS NOTES
ANTICOAGULATION FOLLOW-UP CLINIC VISIT    Patient Name:  Kathryn Banks  Date:  6/28/2017  Contact Type:  Telephone/ VELASQUEZ Michaels nurse    SUBJECTIVE:     Patient Findings     Positives Change in medications (cipro 6/25-6/26)    Comments Reason for Call:  INR     Who is calling?  Home Care:     Phone number:  [phone]     Fax number:       Name of caller: Leona - would like a call back as soon as possible. Is waiting to set up meds.      INR Value:  3.4     Are there any other concerns:  No     Can we leave a detailed message on this number? YES     Phone number patient can be reached at: Home number on file [phone] (home)        Call taken on 6/28/2017 at 9:52 AM by Sienna Lafleur              OBJECTIVE    INR   Date Value Ref Range Status   06/28/2017 3.4  Final       ASSESSMENT / PLAN  INR assessment SUPRA    Recheck INR In: 5 DAYS    INR Location Homecare INR      Anticoagulation Summary as of 6/28/2017     INR goal 2.0-3.0   Today's INR 3.4!   Maintenance plan 7.5 mg (2.5 mg x 3) on Fri; 5 mg (2.5 mg x 2) all other days   Full instructions 6/28: 2.5 mg; 6/29: 2.5 mg; 6/30: 5 mg; 7/1: 2.5 mg; Otherwise 7.5 mg on Fri; 5 mg all other days   Weekly total 37.5 mg   Plan last modified Nicki Calvillo, RN (4/11/2017)   Next INR check 7/3/2017   Target end date     Indications   Long-term (current) use of anticoagulants [Z79.01] [Z79.01]  Atrial fibrillation (H) (Resolved) [I48.91]  Paroxysmal atrial fibrillation (H) (Resolved) [I48.0]         Anticoagulation Episode Summary     INR check location     Preferred lab     Send INR reminders to JUAN CARLOS BRYAN    Comments 2.5 mg tablets, pm dose. Alere home monitor      Anticoagulation Care Providers     Provider Role Specialty Phone number    Dheeraj Jj MD [facility] Internal Medicine [phone]            See the Encounter Report to view Anticoagulation Flowsheet and Dosing Calendar (Go to Encounters tab in chart review, and find the Anticoagulation  Therapy Visit)    Dosage adjustment made based on physician directed care plan.    Pt admitted 6/21-6/25. Per Leona, pt states she took 2.5 mg Sunday when she got home. She was also on cipro Sun-Monday.   I have advised pt to take 2.5 mg Wed, Thurs, 5 mg Fri, 2.5 mg Sat, 5 mg Sun; recheck Monday     Zo Davis RN

## 2017-06-29 NOTE — TELEPHONE ENCOUNTER
Attempted to get patient scheduled with Elis Rodas at [facility] for sometime next week. Nephrology scheduling team is stating that Elis does not accept new patients. Will route this message to Elis Rodas CNP and Yovani Sebastian RN for approval to schedule patient.    This writer will notify patient of outcome.     Arlet Webb RN, BSN  Nephrology Care Coordinator  [facility]

## 2017-06-29 NOTE — TELEPHONE ENCOUNTER
Elis Rodas CNP agreed to add patient to her schedule next week. The  of  nursing staff will contact her to schedule this. The appointment will be either Wednesday or Friday of next week.     Left detailed message for Kathryn with this information. She should keep the follow up visit with Dr. Hill scheduled for 7/11/17. Advised that she call back with any questions.     Arlet Webb RN, BSN  Nephrology Care Coordinator  [facility]

## 2017-06-30 DIAGNOSIS — N17.9 ACUTE KIDNEY INJURY (NONTRAUMATIC) (H): Primary | ICD-10-CM

## 2017-06-30 NOTE — TELEPHONE ENCOUNTER
Hi. I called patient to schedule to see Elis 7/5 at 130 with labs at 1230, patient was a little unsure about a ride but said she charlie try to come. I went over the day and time a few times with her to confirm.  Denise Soto LPN  Nephrology  [facility]  [phone]

## 2017-07-03 ENCOUNTER — TELEPHONE (OUTPATIENT)
Dept: INTERNAL MEDICINE | Facility: CLINIC | Age: 77
End: 2017-07-03

## 2017-07-03 NOTE — TELEPHONE ENCOUNTER
In the absence of shortness of breath, and with an adequate oxygen saturation, she should elevate her feet as much as possible and follow-up with Dr. Arango later in the week.    Thank you.    Monica

## 2017-07-03 NOTE — TELEPHONE ENCOUNTER
Reason for Call:  Other Update    Detailed comments: Patients weight today was 169, O2 97% on room air, lungs clear, +1 pitting edema in both legs & feet, slight weeping from legs.  Homecare states no weeping last Fri but now she is.   Please advise if patient needs to be seen     Phone Number Patient can be reached at: Other phone number:  [phone]  [place]    Best Time:     Can we leave a detailed message on this number? YES    Call taken on 7/3/2017 at 1:04 PM by Josephine Tejeda

## 2017-07-05 ENCOUNTER — TELEPHONE (OUTPATIENT)
Dept: FAMILY MEDICINE | Facility: CLINIC | Age: 77
End: 2017-07-05

## 2017-07-05 NOTE — TELEPHONE ENCOUNTER
Reason for Call:  Home Health Care    Argenis with [facility] called regarding (reason for call): Faxed orders for tele monitoring on 6/11 with no response. Please authorize.     Orders are needed for this patient.     PT:     OT:     Skilled Nursing:     Pt Provider: Dheeraj Jj     Phone Number Homecare Nurse can be reached at: [phone]    Can we leave a detailed message on this number? YES    Phone number patient can be reached at: Other phone number:      Best Time: any     Call taken on 7/5/2017 at 10:57 AM by Crystal Hernandez

## 2017-07-06 ENCOUNTER — CARE COORDINATION (OUTPATIENT)
Dept: CARE COORDINATION | Facility: CLINIC | Age: 77
End: 2017-07-06

## 2017-07-06 ENCOUNTER — HOSPITAL ENCOUNTER (EMERGENCY)
Facility: CLINIC | Age: 77
Discharge: HOME | End: 2017-07-06
Attending: EMERGENCY MEDICINE | Admitting: EMERGENCY MEDICINE
Payer: MEDICARE

## 2017-07-06 ENCOUNTER — HOSPITAL ENCOUNTER (INPATIENT)
Facility: CLINIC | Age: 77
LOS: 5 days | Discharge: HOME HEALTH | DRG: 683 | End: 2017-07-11
Attending: INTERNAL MEDICINE | Admitting: HOSPITALIST
Payer: MEDICARE

## 2017-07-06 ENCOUNTER — TELEPHONE (OUTPATIENT)
Dept: INTERNAL MEDICINE | Facility: CLINIC | Age: 77
End: 2017-07-06

## 2017-07-06 ENCOUNTER — APPOINTMENT (OUTPATIENT)
Dept: GENERAL RADIOLOGY | Facility: CLINIC | Age: 77
End: 2017-07-06
Attending: EMERGENCY MEDICINE
Payer: MEDICARE

## 2017-07-06 ENCOUNTER — ANTICOAGULATION THERAPY VISIT (OUTPATIENT)
Dept: ANTICOAGULATION | Facility: CLINIC | Age: 77
End: 2017-07-06
Payer: COMMERCIAL

## 2017-07-06 ENCOUNTER — APPOINTMENT (OUTPATIENT)
Dept: ULTRASOUND IMAGING | Facility: CLINIC | Age: 77
End: 2017-07-06
Attending: EMERGENCY MEDICINE
Payer: MEDICARE

## 2017-07-06 VITALS
BODY MASS INDEX: 29.71 KG/M2 | HEIGHT: 64 IN | WEIGHT: 174 LBS | TEMPERATURE: 97.5 F | OXYGEN SATURATION: 97 % | DIASTOLIC BLOOD PRESSURE: 73 MMHG | RESPIRATION RATE: 20 BRPM | SYSTOLIC BLOOD PRESSURE: 105 MMHG | HEART RATE: 68 BPM
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
## 2017-07-06 DIAGNOSIS — I12.9 RENAL HYPERTENSION, STAGE 1-4 OR UNSPECIFIED CHRONIC KIDNEY DISEASE: ICD-10-CM

## 2017-07-06 DIAGNOSIS — D63.1 ANEMIA OF CHRONIC RENAL FAILURE, STAGE 4 (SEVERE) (H): ICD-10-CM

## 2017-07-06 DIAGNOSIS — N18.4 ANEMIA OF CHRONIC RENAL FAILURE, STAGE 4 (SEVERE) (H): ICD-10-CM

## 2017-07-06 DIAGNOSIS — Z79.01 LONG-TERM (CURRENT) USE OF ANTICOAGULANTS: ICD-10-CM

## 2017-07-06 DIAGNOSIS — R79.1 ELEVATED INR: ICD-10-CM

## 2017-07-06 DIAGNOSIS — D63.1 ERYTHROPOIETIN DEFICIENCY ANEMIA: ICD-10-CM
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
## 2017-07-06 DIAGNOSIS — N18.4 STAGE 4 CHRONIC KIDNEY DISEASE (H): Primary | ICD-10-CM

## 2017-07-06 DIAGNOSIS — N17.9 ACUTE RENAL FAILURE SUPERIMPOSED ON STAGE 4 CHRONIC KIDNEY DISEASE, UNSPECIFIED ACUTE RENAL FAILURE TYPE (H): ICD-10-CM

## 2017-07-06 DIAGNOSIS — S20.212A RIB CONTUSION, LEFT, INITIAL ENCOUNTER: ICD-10-CM
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
## 2017-07-06 DIAGNOSIS — I50.9 CONGESTIVE HEART FAILURE, UNSPECIFIED CONGESTIVE HEART FAILURE CHRONICITY, UNSPECIFIED CONGESTIVE HEART FAILURE TYPE: ICD-10-CM

## 2017-07-06 DIAGNOSIS — R74.8 ALKALINE PHOSPHATASE ELEVATION: ICD-10-CM

## 2017-07-06 DIAGNOSIS — N18.4 ACUTE RENAL FAILURE SUPERIMPOSED ON STAGE 4 CHRONIC KIDNEY DISEASE, UNSPECIFIED ACUTE RENAL FAILURE TYPE (H): ICD-10-CM

## 2017-07-06 DIAGNOSIS — I50.9 HEART FAILURE, UNSPECIFIED (H): ICD-10-CM

## 2017-07-06 DIAGNOSIS — I50.32 CHRONIC DIASTOLIC HEART FAILURE (H): ICD-10-CM

## 2017-07-06 DIAGNOSIS — S20.212A CONTUSION OF LEFT FRONT WALL OF THORAX, INITIAL ENCOUNTER: ICD-10-CM

## 2017-07-06 DIAGNOSIS — E11.22 TYPE 2 DIABETES MELLITUS WITH CHRONIC KIDNEY DISEASE, WITHOUT LONG-TERM CURRENT USE OF INSULIN, UNSPECIFIED CKD STAGE (H): ICD-10-CM

## 2017-07-06 DIAGNOSIS — R60.1 GENERALIZED EDEMA: ICD-10-CM

## 2017-07-06 DIAGNOSIS — I48.91 ATRIAL FIBRILLATION WITH RAPID VENTRICULAR RESPONSE (H): ICD-10-CM

## 2017-07-06 LAB
ALBUMIN SERPL-MCNC: 2.9 G/DL (ref 3.4–5)
ALP SERPL-CCNC: 225 U/L (ref 40–150)
ALT SERPL W P-5'-P-CCNC: 42 U/L (ref 0–50)
ANION GAP SERPL CALCULATED.3IONS-SCNC: 11 MMOL/L (ref 3–14)
AST SERPL W P-5'-P-CCNC: 22 U/L (ref 0–45)
BASOPHILS # BLD AUTO: 0.1 10E9/L (ref 0–0.2)
BASOPHILS NFR BLD AUTO: 0.9 %
BILIRUB SERPL-MCNC: 0.3 MG/DL (ref 0.2–1.3)
BUN SERPL-MCNC: 84 MG/DL (ref 7–30)
CALCIUM SERPL-MCNC: 6.9 MG/DL (ref 8.5–10.1)
CHLORIDE SERPL-SCNC: 123 MMOL/L (ref 94–109)
CO2 SERPL-SCNC: 13 MMOL/L (ref 20–32)
CREAT SERPL-MCNC: 4.36 MG/DL (ref 0.52–1.04)
DIFFERENTIAL METHOD BLD: ABNORMAL
EOSINOPHIL # BLD AUTO: 0.2 10E9/L (ref 0–0.7)
EOSINOPHIL NFR BLD AUTO: 2.8 %
ERYTHROCYTE [DISTWIDTH] IN BLOOD BY AUTOMATED COUNT: 21 % (ref 10–15)
GFR SERPL CREATININE-BSD FRML MDRD: 10 ML/MIN/1.7M2
GLUCOSE BLDC GLUCOMTR-MCNC: 134 MG/DL (ref 70–99)
GLUCOSE SERPL-MCNC: 149 MG/DL (ref 70–99)
HCT VFR BLD AUTO: 27.8 % (ref 35–47)
HGB BLD-MCNC: 8.1 G/DL (ref 11.7–15.7)
IMM GRANULOCYTES # BLD: 0.1 10E9/L (ref 0–0.4)
IMM GRANULOCYTES NFR BLD: 1.2 %
INR PPP: 6.41 (ref 0.86–1.14)
INR PPP: 7.4
LYMPHOCYTES # BLD AUTO: 1.3 10E9/L (ref 0.8–5.3)
LYMPHOCYTES NFR BLD AUTO: 16.3 %
MAGNESIUM SERPL-MCNC: 2.3 MG/DL (ref 1.6–2.3)
MCH RBC QN AUTO: 23.1 PG (ref 26.5–33)
MCHC RBC AUTO-ENTMCNC: 29.1 G/DL (ref 31.5–36.5)
MCV RBC AUTO: 79 FL (ref 78–100)
MONOCYTES # BLD AUTO: 0.7 10E9/L (ref 0–1.3)
MONOCYTES NFR BLD AUTO: 8.5 %
NEUTROPHILS # BLD AUTO: 5.8 10E9/L (ref 1.6–8.3)
NEUTROPHILS NFR BLD AUTO: 70.3 %
NT-PROBNP SERPL-MCNC: ABNORMAL PG/ML (ref 0–1800)
PHOSPHATE SERPL-MCNC: 7.1 MG/DL (ref 2.5–4.5)
PLATELET # BLD AUTO: 196 10E9/L (ref 150–450)
POTASSIUM SERPL-SCNC: 5.6 MMOL/L (ref 3.4–5.3)
PROT SERPL-MCNC: 6 G/DL (ref 6.8–8.8)
RBC # BLD AUTO: 3.51 10E12/L (ref 3.8–5.2)
SODIUM SERPL-SCNC: 147 MMOL/L (ref 133–144)
WBC # BLD AUTO: 8.2 10E9/L (ref 4–11)

## 2017-07-06 PROCEDURE — 80053 COMPREHEN METABOLIC PANEL: CPT | Performed by: EMERGENCY MEDICINE

## 2017-07-06 PROCEDURE — 00000146 ZZHCL STATISTIC GLUCOSE BY METER IP

## 2017-07-06 PROCEDURE — 99207 ZZC NO CHARGE NURSE ONLY: CPT | Performed by: INTERNAL MEDICINE

## 2017-07-06 PROCEDURE — 99285 EMERGENCY DEPT VISIT HI MDM: CPT | Mod: Z6 | Performed by: EMERGENCY MEDICINE

## 2017-07-06 PROCEDURE — 99285 EMERGENCY DEPT VISIT HI MDM: CPT | Mod: 25 | Performed by: EMERGENCY MEDICINE

## 2017-07-06 PROCEDURE — 84133 ASSAY OF URINE POTASSIUM: CPT | Performed by: FAMILY MEDICINE

## 2017-07-06 PROCEDURE — 85025 COMPLETE CBC W/AUTO DIFF WBC: CPT | Performed by: EMERGENCY MEDICINE

## 2017-07-06 PROCEDURE — 82436 ASSAY OF URINE CHLORIDE: CPT | Performed by: FAMILY MEDICINE

## 2017-07-06 PROCEDURE — 71101 X-RAY EXAM UNILAT RIBS/CHEST: CPT | Mod: TC,LT

## 2017-07-06 PROCEDURE — 83880 ASSAY OF NATRIURETIC PEPTIDE: CPT | Performed by: EMERGENCY MEDICINE

## 2017-07-06 PROCEDURE — 83735 ASSAY OF MAGNESIUM: CPT | Performed by: EMERGENCY MEDICINE

## 2017-07-06 PROCEDURE — 76705 ECHO EXAM OF ABDOMEN: CPT

## 2017-07-06 PROCEDURE — 84300 ASSAY OF URINE SODIUM: CPT | Performed by: FAMILY MEDICINE

## 2017-07-06 PROCEDURE — 12000026 ZZH R&B TRANSPLANT

## 2017-07-06 PROCEDURE — 71010 XR CHEST 1 VW: CPT | Mod: TC

## 2017-07-06 PROCEDURE — 85610 PROTHROMBIN TIME: CPT | Performed by: EMERGENCY MEDICINE

## 2017-07-06 PROCEDURE — 84100 ASSAY OF PHOSPHORUS: CPT | Performed by: EMERGENCY MEDICINE

## 2017-07-06 RX ORDER — NALOXONE HYDROCHLORIDE 0.4 MG/ML
.1-.4 INJECTION, SOLUTION INTRAMUSCULAR; INTRAVENOUS; SUBCUTANEOUS
Status: DISCONTINUED | OUTPATIENT
Start: 2017-07-06 | End: 2017-07-11 | Stop reason: HOSPADM

## 2017-07-06 RX ORDER — ISOSORBIDE MONONITRATE 30 MG/1
30 TABLET, EXTENDED RELEASE ORAL
Status: DISCONTINUED | OUTPATIENT
Start: 2017-07-06 | End: 2017-07-10

## 2017-07-06 RX ORDER — NICOTINE POLACRILEX 4 MG
15-30 LOZENGE BUCCAL
Status: DISCONTINUED | OUTPATIENT
Start: 2017-07-06 | End: 2017-07-11 | Stop reason: HOSPADM

## 2017-07-06 RX ORDER — DEXTROSE MONOHYDRATE 25 G/50ML
25-50 INJECTION, SOLUTION INTRAVENOUS
Status: DISCONTINUED | OUTPATIENT
Start: 2017-07-06 | End: 2017-07-11 | Stop reason: HOSPADM

## 2017-07-06 RX ORDER — PRAMIPEXOLE DIHYDROCHLORIDE 1.5 MG/1
1.5 TABLET ORAL
Status: DISCONTINUED | OUTPATIENT
Start: 2017-07-06 | End: 2017-07-11 | Stop reason: HOSPADM

## 2017-07-06 RX ORDER — AMOXICILLIN 250 MG
1-2 CAPSULE ORAL 2 TIMES DAILY
Status: DISCONTINUED | OUTPATIENT
Start: 2017-07-07 | End: 2017-07-11 | Stop reason: HOSPADM

## 2017-07-06 RX ORDER — ACETAMINOPHEN 325 MG/1
650 TABLET ORAL EVERY 4 HOURS PRN
Status: DISCONTINUED | OUTPATIENT
Start: 2017-07-06 | End: 2017-07-07

## 2017-07-06 RX ORDER — HYDROMORPHONE HYDROCHLORIDE 1 MG/ML
.3-.5 INJECTION, SOLUTION INTRAMUSCULAR; INTRAVENOUS; SUBCUTANEOUS ONCE
Status: DISCONTINUED | OUTPATIENT
Start: 2017-07-06 | End: 2017-07-06 | Stop reason: HOSPADM

## 2017-07-06 RX ORDER — ONDANSETRON 4 MG/1
4 TABLET, ORALLY DISINTEGRATING ORAL EVERY 6 HOURS PRN
Status: DISCONTINUED | OUTPATIENT
Start: 2017-07-06 | End: 2017-07-11 | Stop reason: HOSPADM

## 2017-07-06 RX ORDER — SODIUM BICARBONATE 650 MG/1
650 TABLET ORAL 3 TIMES DAILY
Status: DISCONTINUED | OUTPATIENT
Start: 2017-07-07 | End: 2017-07-07

## 2017-07-06 RX ORDER — NITROGLYCERIN 0.4 MG/1
0.4 TABLET SUBLINGUAL EVERY 5 MIN PRN
Status: DISCONTINUED | OUTPATIENT
Start: 2017-07-06 | End: 2017-07-11 | Stop reason: HOSPADM

## 2017-07-06 RX ORDER — PRAVASTATIN SODIUM 40 MG
40 TABLET ORAL DAILY
Status: DISCONTINUED | OUTPATIENT
Start: 2017-07-07 | End: 2017-07-11 | Stop reason: HOSPADM

## 2017-07-06 RX ORDER — ISOSORBIDE MONONITRATE 30 MG/1
30 TABLET, EXTENDED RELEASE ORAL DAILY
Status: DISCONTINUED | OUTPATIENT
Start: 2017-07-07 | End: 2017-07-06

## 2017-07-06 RX ORDER — METOPROLOL TARTRATE 50 MG
50 TABLET ORAL 2 TIMES DAILY
Status: DISCONTINUED | OUTPATIENT
Start: 2017-07-07 | End: 2017-07-06

## 2017-07-06 RX ORDER — ONDANSETRON 2 MG/ML
4 INJECTION INTRAMUSCULAR; INTRAVENOUS EVERY 6 HOURS PRN
Status: DISCONTINUED | OUTPATIENT
Start: 2017-07-06 | End: 2017-07-11 | Stop reason: HOSPADM

## 2017-07-06 RX ORDER — METOPROLOL TARTRATE 25 MG/1
25 TABLET, FILM COATED ORAL 2 TIMES DAILY
Status: DISCONTINUED | OUTPATIENT
Start: 2017-07-07 | End: 2017-07-10

## 2017-07-06 RX ORDER — GABAPENTIN 300 MG/1
300 CAPSULE ORAL
Status: DISCONTINUED | OUTPATIENT
Start: 2017-07-06 | End: 2017-07-11 | Stop reason: HOSPADM

## 2017-07-06 NOTE — PROGRESS NOTES
Clinic Care Coordination Contact  Care Team Conversations    Home Care Nurse Karly called to update CC on pt status. States she was out to see her today and pt has gained 14 in the last week 5 of which was last night.  She is also concerned about a fall she recently had but did not report.  There is a definite concern for pt living environment as her granddaughter has overdosed on pt's pain medications and now back in the home. There has been a [facility] filed by [facility].  Pt was sent to ED and assume will be admitted.  We both recommend TCU placement with long term planning to a safe environment home vs nursing home.     Plan:  RN will continue to monitor for disposition.     Meenakshi Larsen RN, B[place] [address]  Office: [phone]  Fax [phone]   [email or web address]   [email or web address]     Connect with [facility] on social media.

## 2017-07-06 NOTE — IP AVS SNAPSHOT
[address] [address]    Phone:  [phone]                                       After Visit Summary   7/6/2017    Kathryn Banks    MRN: [id number]           After Visit Summary Signature Page     I have received my discharge instructions, and my questions have been answered. I have discussed any challenges I see with this plan with the nurse or doctor.    ..........................................................................................................................................  Patient/Patient Representative Signature      ..........................................................................................................................................  Patient Representative Print Name and Relationship to Patient    ..................................................               ................................................  Date                                            Time    ..........................................................................................................................................  Reviewed by Signature/Title    ...................................................              ..............................................  Date                                                            Time

## 2017-07-06 NOTE — ED NOTES
Medication reconciliation not finished in triage. Primary nurse will need to address before admission.

## 2017-07-06 NOTE — LETTER
Transition Communication Hand-off for Care Transitions to Next Level of Care Provider    Name: Kathryn Banks  MRN #: [id number]  Primary Care Provider: Dheeraj Jj     Primary Clinic: [facility] [address] [place] [address]     Reason for Hospitalization:  renal failure  CHF  CHF (congestive heart failure) (H)  Admit Date/Time: 7/6/2017  8:31 PM  Discharge Date: 7/10/2017   Payor Source: Payor: BCBS / Plan: BCBS PLATINUM BLUE / Product Type: PPO /        Reason for Communication Hand-off Referral: Admission diagnoses: CHF  Multiple providers/specialties    Discharge Plan:  Discharge Plan:       Most Recent Value    Concerns Comments ? new HD, INR next check           Discharge Needs Assessment:  Needs       Most Recent Value    Home Care [facility] [phone], Fax: [phone]          Follow-up plan:  Future Appointments  Date Time Provider Department Center   7/11/2017 3:00 PM [facility] [place]   7/11/2017 3:30 PM Vibha Barfield MD [facility]   7/27/2017 11:00 AM Thaddeus King MD [facility]       Any outstanding tests or procedures:        Referrals     Future Labs/Procedures    Home care nursing referral     Comments:    RN skilled nursing visit. RN to assess vital signs and weight, respiratory and cardiac status, pain level and activity tolerance, hydration, nutrition and bowel status, home safety and please begin tele monitoring for Weight with her CHF and Renal failure DX.  .  RN to teach weight management- heart failure and renal failure monitoring.  Home health aide to help with showering   _______________________  [facility]  Phone  [phone]  Fax  [phone]  ______________________       Your provider has ordered home care nursing services. If you have not been contacted within 2 days of your discharge please call the inpatient department phone number at [phone] .            Zurita Recommendations:   Recommend tele monitoring with [facility] for Renal/CHF.  Follow up with nephrology.     Marsha Espinal, RNCC, BSN    [facility]  [address] [place] [id number]    [email or web address]    Office: [phone] Pager: [phone]       AVS/Discharge Summary is the source of truth; this is a helpful guide for improved communication of patient story

## 2017-07-06 NOTE — TELEPHONE ENCOUNTER
Reason for Call:  INR    Who is calling?  Home Care: [facility]    Phone number:  [phone]    Fax number:      Name of caller: Karly    INR Value:  7.4    Are there any other concerns:  No-patient did have a fall and has bruising on her left torso    Can we leave a detailed message on this number? YES    Phone number patient can be reached at: Home number on file [phone] (home)      Call taken on 7/6/2017 at 1:52 PM by Jo-Ann Bartlett

## 2017-07-06 NOTE — MR AVS SNAPSHOT
Kathryn MICKY Banks   7/6/2017   Anticoagulation Therapy Visit    Description:  77 year old female   Provider:  Dheeraj Jj MD   Department:  Ph Anticoag           INR as of 7/6/2017     Today's INR 7.4!      Anticoagulation Summary as of 7/6/2017     INR goal 2.0-3.0   Today's INR 7.4!   Full instructions 7/6: Hold; 7/7: Hold; Otherwise 7.5 mg on Fri; 5 mg all other days   Next INR check 7/7/2017    Indications   Long-term (current) use of anticoagulants [Z79.01] [Z79.01]  Atrial fibrillation (H) (Resolved) [I48.91]  Paroxysmal atrial fibrillation (H) (Resolved) [I48.0]         Contact Numbers     Clinic Number:         July 2017 Details    Sun Mon Tue Wed Thu Fri Sat           1                 2               3               4               5               6      Hold   See details      7            8                 9               10               11               12               13               14               15                 16               17               18               19               20               21               22                 23               24               25               26               27               28               29                 30               31                     Date Details   07/06 This INR check       Date of next INR:  7/7/2017         How to take your warfarin dose     Hold Do not take your warfarin dose. See the Details table to the right for additional instructions.

## 2017-07-06 NOTE — TELEPHONE ENCOUNTER
Telephone phone call back from HC nurse, Karly, states that she sent pt into ER.  States that pt had a fall 2 days ago with bruising on her left ribs and left side, and INR was elevated to 7.4.  Sangamon rhonci with auscultation and pt had increased SOB. Pt currently still in ER.  Karly states that if pt gets discharged today or tomorrow that she will be going out to the home tomorrow or Saturday.  Routing to Dr. Jj as FYI.    Yovani Ruvalcaba RN, BSN

## 2017-07-06 NOTE — IP AVS SNAPSHOT
MRN:[id number]                      After Visit Summary   7/6/2017    Kathryn Banks    MRN: [id number]           Thank you!     Thank you for choosing [facility] for your care. Our goal is always to provide you with excellent care. Hearing back from our patients is one way we can continue to improve our services. Please take a few minutes to complete the written survey that you may receive in the mail after you visit with us. Thank you!        Patient Information     Date Of Birth          1940        Designated Caregiver       Most Recent Value    Caregiver    Will someone help with your care after discharge? yes    Name of designated caregiver Jennifer Bermudez    Phone number of caregiver [phone]    Caregiver address [address] [address]      About your hospital stay     You were admitted on:  July 6, 2017 You last received care in the:  [address] [facility]    You were discharged on:  July 11, 2017        Reason for your hospital stay       You were admitted due to worsening kidney function with the concern that dialysis may be required but there was an improvement in kidney function with diuresis alone. Nephrology was consulted and following along during hospital stay. Kidney function and electrolytes improved                  Who to Call     For medical emergencies, please call 911.  For non-urgent questions about your medical care, please call your primary care provider or clinic, [phone]          Attending Provider     Provider Specialty    Deondre Terrazas MD Internal Medicine    Juan Lopez MD [facility]    Adryan Damian MD Family Practice       Primary Care Provider Office Phone # Fax #    Dheeraj Jj [phone]      After Care Instructions     Activity       Your activity upon discharge: activity as tolerated            Diet       Follow this diet upon discharge: Orders Placed This Encounter      Renal Diet (non-dialysis)             Discharge Instructions       Monitor your weight daily. If it has increased >2lbs, take one extra tablet of your furosemide            Monitor and record       Daily weight                  Follow-up Appointments     Adult [facility] Follow-up and recommended labs and tests       Follow up with primary care provider, Dheeraj Jj, in 2 days for hospital follow- up.  The following labs/tests are recommended: BMP, INR, Mg, Phos, Ionized calcium.      Appointments on [place] and/or [facility] (with [facility] or [facility] provider or service). Call [phone] if you haven't heard regarding these appointments within 7 days of discharge.                  Your next 10 appointments already scheduled     Jul 27, 2017 11:00 AM CDT   [facility] Visit with Thaddeus [facility] MD   [facility]    [address] [place] [id number] [phone]              Additional Services     Home care nursing referral       RN skilled nursing visit. RN to assess vital signs and weight, respiratory and cardiac status, pain level and activity tolerance, hydration, nutrition and bowel status, home safety and please begin tele monitoring for Weight with her CHF and Renal failure DX.  .  RN to teach weight management- heart failure and renal failure monitoring.  Home health aide to help with showering   _______________________  [facility]  Phone  [phone]  Fax  [phone]  ______________________       Your provider has ordered home care nursing services. If you have not been contacted within 2 days of your discharge please call the inpatient department phone number at [phone] .                  Further instructions from your care team       Call made to your PCP clinic to see if they can work you into the schedule for this Friday.  You will need a repeat INR on Friday as well as follow up with PCP by the end of the week.  If they do not call you tuesday afternoon please contact  "clinic to ask to be scheduled by the end of the week.      Nephrology clinic will contact you to reschedule your appointment and fit you into see your nephrologist next week.  You will need labs prior to the appointment.  We cancelled your appointments for 2017 with Vibha Barfield MD    The following labs are recommended on follow up with PCP: INR, BMP, Mg, Phos, ionized calcium    Hold your warfarin until you see your PCP. An INR will be checked at that time and your PCP will then manage your warfarin dose.     Pending Results     Date and Time Order Name Status Description    2017 EKG 12-lead, tracing only Preliminary             Statement of Approval     Ordered          17 0897  I have reviewed and agree with all the recommendations and orders detailed in this document.  EFFECTIVE NOW     Approved and electronically signed by:  Damian Contreras MD             Admission Information     Date & Time Provider Department Dept. Phone    2017 Adryan Damian MD [address] [facility] [phone]      Your Vitals Were     Blood Pressure Pulse Temperature Respirations Weight Pulse Oximetry    150/77 (BP Location: Left arm) 74 98.2  F (36.8  C) (Oral) 16 74.2 kg (163 lb 8 oz) 93%    BMI (Body Mass Index)                   28.06 kg/m2           [employer] Information     See[employer]pod lets you send messages to your doctor, view your test results, renew your prescriptions, schedule appointments and more. To sign up, go to [email or web address] . Click on \"Log in\" on the left side of the screen, which will take you to the Welcome page. Then click on \"Sign up Now\" on the right side of the page.     You will be asked to enter the access code listed below, as well as some personal information. Please follow the directions to create your username and password.     Your access code is: [email or web address]  Expires: 2017 11:18 AM     Your access code will  in 90 days. If you need help or a new " code, please call your [place] clinic or [phone].        Care EveryWhere ID     This is your Care EveryWhere ID. This could be used by other organizations to access your [place] medical records  [id number]        Equal Access to Services     CAROLINE BLAND : Hadii aad ku hadlucille Garcia, wacatarinoda luqadaha, qaybta kaalmada shayan, tay manuelangel saraviaюлия banda sukhdev murillo. So [facility] [phone].    ATENCIÓN: Si habla español, tiene a medrano disposición servicios gratuitos de asistencia lingüística. Llame al [phone].    We comply with applicable federal civil rights laws and [place] laws. We do not discriminate on the basis of race, color, national origin, age, disability sex, sexual orientation or gender identity.               Review of your medicines      START taking        Dose / Directions    calcium acetate 667 MG Caps capsule   Commonly known as:  PHOSLO   Used for:  Stage 4 chronic kidney disease (H)        Dose:  1334 mg   Take 2 capsules (1,334 mg) by mouth 3 times daily (with meals)   Quantity:  180 capsule   Refills:  0       furosemide 40 MG tablet   Commonly known as:  LASIX   Used for:  Chronic diastolic heart failure (H)        Dose:  40 mg   Take 1 tablet (40 mg) by mouth daily   Quantity:  60 tablet   Refills:  0         CONTINUE these medicines which may have CHANGED, or have new prescriptions. If we are uncertain of the size of tablets/capsules you have at home, strength may be listed as something that might have changed.        Dose / Directions    gabapentin 300 MG capsule   Commonly known as:  NEURONTIN   This may have changed:  when to take this   Used for:  Diabetic polyneuropathy associated with type 2 diabetes mellitus (H)        Dose:  300 mg   Take 1 capsule (300 mg) by mouth At Bedtime   Refills:  0         CONTINUE these medicines which have NOT CHANGED        Dose / Directions    acetaminophen 325 MG tablet   Commonly known as:  TYLENOL   Used for:  Acute on chronic renal  failure (H)        Dose:  650 mg   Take 2 tablets (650 mg) by mouth every 4 hours as needed for mild pain   Quantity:  100 tablet   Refills:  0       calcium carbonate-vitamin D 500-400 MG-UNIT Tabs per tablet   Used for:  Closed intertrochanteric fracture of left hip, initial encounter        Dose:  1 tablet   Take 1 tablet by mouth daily   Refills:  0       cyanocobalamin 1000 MCG/ML injection   Commonly known as:  VITAMIN B12   Used for:  Vitamin B12 deficiency (non anemic)        Dose:  1 mL   Inject 1 mL (1,000 mcg) into the muscle every 30 days   Quantity:  1 mL   Refills:  11       ferrous sulfate 325 (65 FE) MG tablet   Commonly known as:  IRON   Used for:  Iron deficiency anemia secondary to inadequate dietary iron intake        Dose:  325 mg   Take 1 tablet (325 mg) by mouth daily (with breakfast)   Quantity:  100 tablet   Refills:  0       metoprolol 25 MG tablet   Commonly known as:  LOPRESSOR   Used for:  Atrial fibrillation with rapid ventricular response (H)        Dose:  50 mg   Take 2 tablets (50 mg) by mouth 2 times daily   Quantity:  60 tablet   Refills:  0       nitroGLYcerin 0.4 MG sublingual tablet   Commonly known as:  NITROSTAT   Used for:  Hx of non-ST elevation myocardial infarction (NSTEMI), Atherosclerosis of native coronary artery of native heart without angina pectoris, Postsurgical aortocoronary bypass status        Dose:  0.4 mg   Place 1 tablet (0.4 mg) under the tongue every 5 minutes as needed for chest pain   Quantity:  25 tablet   Refills:  0       order for DME   Used for:  ANABEL (obstructive sleep apnea)        Equipment being ordered: cpap machine, mask, humidifier, tubing and filters   Quantity:  1 Device   Refills:  0       pramipexole 0.5 MG tablet   Commonly known as:  MIRAPEX   Used for:  Restless legs syndrome (RLS)        Dose:  1.5 mg   Take 3 tablets (1.5 mg) by mouth every evening   Refills:  0       pravastatin 40 MG tablet   Commonly known as:  PRAVACHOL   Used for:   Hx of non-ST elevation myocardial infarction (NSTEMI), Atherosclerosis of native coronary artery of native heart without angina pectoris, Type 2 diabetes mellitus with other circulatory complications (H)        Dose:  40 mg   Take 1 tablet (40 mg) by mouth daily   Quantity:  90 tablet   Refills:  3       sodium bicarbonate 650 MG tablet   Used for:  Stage 4 chronic kidney disease (H)        Dose:  650 mg   Take 1 tablet (650 mg) by mouth 3 times daily   Quantity:  90 tablet   Refills:  0         STOP taking     isosorbide mononitrate 30 MG 24 hr tablet   Commonly known as:  IMDUR           warfarin 2.5 MG tablet   Commonly known as:  COUMADIN                Where to get your medicines      Some of these will need a paper prescription and others can be bought over the counter. Ask your nurse if you have questions.     Bring a paper prescription for each of these medications     calcium acetate 667 MG Caps capsule    furosemide 40 MG tablet                Protect others around you: Learn how to safely use, store and throw away your medicines at www.disposemymeds.org.             Medication List: This is a list of all your medications and when to take them. Check marks below indicate your daily home schedule. Keep this list as a reference.      Medications           Morning Afternoon Evening Bedtime As Needed    acetaminophen 325 MG tablet   Commonly known as:  TYLENOL   Take 2 tablets (650 mg) by mouth every 4 hours as needed for mild pain   Last time this was given:  1,000 mg on 7/9/2017 10:46 PM                                calcium acetate 667 MG Caps capsule   Commonly known as:  PHOSLO   Take 2 capsules (1,334 mg) by mouth 3 times daily (with meals)   Last time this was given:  1,334 mg on 7/11/2017  5:46 PM                                calcium carbonate-vitamin D 500-400 MG-UNIT Tabs per tablet   Take 1 tablet by mouth daily                                cyanocobalamin 1000 MCG/ML injection   Commonly known  as:  VITAMIN B12   Inject 1 mL (1,000 mcg) into the muscle every 30 days                                ferrous sulfate 325 (65 FE) MG tablet   Commonly known as:  IRON   Take 1 tablet (325 mg) by mouth daily (with breakfast)                                furosemide 40 MG tablet   Commonly known as:  LASIX   Take 1 tablet (40 mg) by mouth daily   Last time this was given:  40 mg on 7/11/2017  8:34 AM                                gabapentin 300 MG capsule   Commonly known as:  NEURONTIN   Take 1 capsule (300 mg) by mouth At Bedtime                                metoprolol 25 MG tablet   Commonly known as:  LOPRESSOR   Take 2 tablets (50 mg) by mouth 2 times daily   Last time this was given:  50 mg on 7/11/2017  8:35 AM                                nitroGLYcerin 0.4 MG sublingual tablet   Commonly known as:  NITROSTAT   Place 1 tablet (0.4 mg) under the tongue every 5 minutes as needed for chest pain                                order for DME   Equipment being ordered: cpap machine, mask, humidifier, tubing and filters                                pramipexole 0.5 MG tablet   Commonly known as:  MIRAPEX   Take 3 tablets (1.5 mg) by mouth every evening                                pravastatin 40 MG tablet   Commonly known as:  PRAVACHOL   Take 1 tablet (40 mg) by mouth daily   Last time this was given:  40 mg on 7/11/2017  8:34 AM                                sodium bicarbonate 650 MG tablet   Take 1 tablet (650 mg) by mouth 3 times daily   Last time this was given:  1,300 mg on 7/11/2017  2:06 PM

## 2017-07-06 NOTE — TELEPHONE ENCOUNTER
Reason for call:  Patient reporting a symptom    Symptom or request: Leona from home care is calling stating that the patient has had a 5 pound weight gain overnight and is having facial swelling, abdominal swelling and leg weeping     Duration (how long have symptoms been present): 1 day    Have you been treated for this before? No    Additional comments:     Phone Number patient can be reached at:  Home number on file [phone] (home)    Best Time:  any    Can we leave a detailed message on this number:  YES    Call taken on 7/6/2017 at 1:44 PM by Jo-Ann Bartlett

## 2017-07-06 NOTE — PROGRESS NOTES
ANTICOAGULATION FOLLOW-UP CLINIC VISIT    Patient Name:  Kathryn Banks  Date:  7/6/2017  Contact Type:  Telephone    SUBJECTIVE:     Patient Findings     Positives Other complaints (Pt fell today and has brusing starting on her ribs. Karly is sending pt to the ED for eval with multiple issues.), CHF control change (Karly reports pt has had a 15 lb increase over the past week, 5 lb's over night- this may be effecting her INR. )    Comments Reason for Call:  INR     Who is calling?  Home Care: [facility]     Phone number:  [phone]     Fax number:       Name of caller: Karly     INR Value:  7.4     Are there any other concerns:  No-patient did have a fall and has bruising on her left torso     Can we leave a detailed message on this number? YES           OBJECTIVE    INR   Date Value Ref Range Status   07/06/2017 7.4  Final       ASSESSMENT / PLAN  INR assessment SUPRA    Recheck INR In: 1 DAY    INR Location Homecare INR      Anticoagulation Summary as of 7/6/2017     INR goal 2.0-3.0   Today's INR 7.4!   Maintenance plan 7.5 mg (2.5 mg x 3) on Fri; 5 mg (2.5 mg x 2) all other days   Full instructions 7.5 mg on Fri; 5 mg all other days   Weekly total 37.5 mg   Plan last modified Nicki Calvillo, RN (4/11/2017)   Next INR check 7/7/2017   Target end date     Indications   Long-term (current) use of anticoagulants [Z79.01] [Z79.01]  Atrial fibrillation (H) (Resolved) [I48.91]  Paroxysmal atrial fibrillation (H) (Resolved) [I48.0]         Anticoagulation Episode Summary     INR check location     Preferred lab     Send INR reminders to [facility] RANDI    Comments 2.5 mg tablets, pm dose. Alere home monitor      Anticoagulation Care Providers     Provider Role Specialty Phone number    Dheeraj Jj MD [facility] Internal Medicine [phone]            See the Encounter Report to view Anticoagulation Flowsheet and Dosing Calendar (Go to Encounters tab in chart review, and find the Anticoagulation Therapy Visit)    Dosage  adjustment made based on physician directed care plan.  Advised- Hold coumadin today Thurs 7/6/17 and tomorrow Friday 7/7/17 until INR is checked again.     Nicki Calvillo RN

## 2017-07-06 NOTE — TELEPHONE ENCOUNTER
Telephone call attempted to Karly, no answer.  Left message to call clinic back.    Yovani Ruvalcaba RN, BSN

## 2017-07-06 NOTE — ED PROVIDER NOTES
History     Chief Complaint   Patient presents with     Abnormal Labs     The history is provided by medical records, the patient and a relative.     This is a 77-year-old female with history of atrial fibrillation, on Coumadin, diabetes, stage IV chronic kidney disease, coronary disease status post CABG, CHF, hypertension, hyperlipidemia presenting with abnormal labs. Patient was sent to this ED by her home health care secondary to weight gain at home and an elevated INR. She reportedly gained 5 pounds since yesterday and 14 pounds over the course of the week. Review of medical records shows that patient was admitted to [facility] on 6/21/17 secondary to acute on chronic renal insufficiency. At that time, she was taken off of her diuretics as it was thought they were contributing to her renal failure. In addition, she was taken off of her oral diabetes medications because of renal insufficiency.  With her weight gain, she has noted increased swelling of her lower extremities to the point where her legs have begun weeping.  Family reports that this just started within the last day or so.  Patient also reports that she accidentally fell in her home while she was walking. She reports that she missed a hand hold and tipped over onto her left side against an object in her home. She has had bruising and significant left rib pain. With this, she has felt some shortness of breath and pain with breathing or cough. She denies any significant cough.  She checks her INR at home and it was 7.5 today. She denies any change in her dosing. She has not had any blackness or blood in her stools. She has some bruising from the fall on her left side but no other abnormal bruising or bleeding.  She is not had any fevers.  She still makes urine but thinks she may have been urinating less than usual. She denies any abdominal pain, nausea or vomiting.    I have reviewed the Medications, Allergies, Past Medical and Surgical  History, and Social History in the Epic system.    Allergies:   Allergies   Allergen Reactions     Ciprofloxacin Nausea and Vomiting     Zofran did not help     Oxycodone Visual Disturbance     Delusions, blackouts      Lisinopril Cough     Penicillins Rash     Sulfa Drugs GI Disturbance     LOSS OF TASTE         No current facility-administered medications on file prior to encounter.   Current Outpatient Prescriptions on File Prior to Encounter:  pravastatin (PRAVACHOL) 40 MG tablet Take 1 tablet (40 mg) by mouth daily   sodium bicarbonate 650 MG tablet Take 1 tablet (650 mg) by mouth 3 times daily   warfarin (COUMADIN) 2.5 MG tablet Take 1 tablet (2.5 mg) daily until INR is rechecked within 3 days (Patient taking differently: 6/19 INR 3.1 Give 1 mg daily next INR 6/26)   acetaminophen (TYLENOL) 325 MG tablet Take 2 tablets (650 mg) by mouth every 4 hours as needed for mild pain   isosorbide mononitrate (IMDUR) 30 MG 24 hr tablet Take 1 tablet (30 mg) by mouth daily   gabapentin (NEURONTIN) 300 MG capsule Take 1 capsule (300 mg) by mouth At Bedtime (Patient taking differently: Take 300 mg by mouth 2 times daily )   pramipexole (MIRAPEX) 0.5 MG tablet Take 3 tablets (1.5 mg) by mouth every evening   metoprolol (LOPRESSOR) 25 MG tablet Take 2 tablets (50 mg) by mouth 2 times daily   ferrous sulfate (IRON) 325 (65 FE) MG tablet Take 1 tablet (325 mg) by mouth daily (with breakfast)   calcium carbonate-vitamin D 500-400 MG-UNIT TABS per tablet Take 1 tablet by mouth daily   nitroglycerin (NITROSTAT) 0.4 MG sublingual tablet Place 1 tablet (0.4 mg) under the tongue every 5 minutes as needed for chest pain   cyanocobalamin (VITAMIN B12) 1000 MCG/ML injection Inject 1 mL (1,000 mcg) into the muscle every 30 days   ORDER FOR DME Equipment being ordered: cpap machine, mask, humidifier, tubing and filters       Patient Active Problem List   Diagnosis     Restless leg syndrome     Sleep apnea     Lipoma of other skin and  subcutaneous tissue     ESOPHAGEAL REFLUX     Postsurgical aortocoronary bypass status     Iron deficiency anemia     History of peptic ulcer disease     Edema     Kidney stone     Hyperlipidemia LDL goal <100     Advanced directives, counseling/discussion     CAD - NSTEMI, CABG x 5 2007, angio in 2013 with patent grafts other than occluded graft to PL     Hx of non-ST elevation myocardial infarction (NSTEMI)     Health Care Home     Lymphedema     Anemia of chronic renal failure, stage 4 (severe) (H)     Renal failure     Osteoarthritis of hip     Non morbid obesity, unspecified obesity type     Type 2 diabetes mellitus with other circulatory complications (H)     Long-term (current) use of anticoagulants [Z79.01]     Acute renal failure with tubular necrosis (H)     Essential hypertension with goal blood pressure less than 140/90     Atrial fibrillation with rapid ventricular response (H)     Rash - redness medial thighs     Metabolic acidosis, normal anion gap (NAG)     Pulmonary hypertension (H)     Chronic heart failure (H) with preserved EF     Atrial flutter (H) - present 6/12     Generalized edema - anasarca     Hypertensive heart and chronic kidney disease with heart failure and stage 1 through stage 4 chronic kidney disease, or unspecified chronic kidney disease (H)     Supratherapeutic INR     Proteinuria 2.1 g/g Cr     Bradycardia     Acute on chronic renal failure (H)     Memory loss     Chronic atrial fibrillation (H) on 6/12-6/13     Blood in stool     Microscopic hematuria     Acute kidney injury (nontraumatic) (H)     CHF (congestive heart failure) (H)       Past Surgical History:   Procedure Laterality Date     ANGIOPLASTY       C APPENDECTOMY       C CABG, VEIN, FIVE  12/06    SVG x 4 and LIMA to LAD     C SOTO W/O FACETEC FORAMOT/DSKC 1/2 VRT SEG, LUMBAR  1968     C REMV CATARACT INTRACAP,INSERT LENS      Bilateral     C TOTAL ABDOM HYSTERECTOMY  1995     - fibroids     C  "VAGOTOMY/PYLOROPLASTY,SELECT  1970     COLONOSCOPY  12/3/2007     COLONOSCOPY  1/17/2014    Procedure: COLONOSCOPY;  Colonoscopy;  Surgeon: Zack Stearns MD;  Location:  GI     CYSTOSCOPY  11/25/09    [facility]     ENDOSCOPY  03/21/2000    Upper GI     HC COLONOSCOPY THRU STOMA, DIAGNOSTIC  10/7/04    poor prep, repeat in 2-3 years     HC COLONOSCOPY W/WO BRUSH/WASH  10/31/07    Repeat-poor quality prep     HC REMOVAL OF OVARY/TUBE(S)      Salpingo-Oophorectomy, Unilateral     HC REVISE MEDIAN N/CARPAL TUNNEL SURG      Carpal tunnel release     HC UGI ENDOSCOPY DIAG W BIOPSY  10/31/07     HC UGI ENDOSCOPY, SIMPLE EXAM  12/3/2007     OPEN REDUCTION INTERNAL FIXATION HIP NAILING Left 7/3/2016    Procedure: OPEN REDUCTION INTERNAL FIXATION HIP NAILING;  Surgeon: Lake Schulz MD;  Location:  OR       Social History   Substance Use Topics     Smoking status: Former Smoker     Years: 10.00     Quit date: 1/1/1969     Smokeless tobacco: Never Used     Alcohol use 0.0 oz/week     0 Standard drinks or equivalent per week      Comment: occasional- 2 drinks per month       Most Recent Immunizations   Administered Date(s) Administered     HepB-Peds 09/25/2000     Hepatitis A Vac Ped/Adol-2 Dose 10/13/1998     Influenza (High Dose) 3 valent vaccine 09/13/2016     Influenza (IIV3) 10/01/2010     Meningococcal (Menomune ) 04/02/2007     Pneumococcal (PCV 13) 09/13/2016     Pneumococcal 23 valent 11/17/2014     Poliovirus, inactivated (IPV) 04/02/2007     TD (ADULT, 7+) 10/31/2010     Typhoid IM 04/02/2007     Yellow Fever 04/02/2007     Zoster vaccine, live 11/01/2011       BMI: Estimated body mass index is 29.87 kg/(m^2) as calculated from the following:    Height as of this encounter: 1.626 m (5' 4\").    Weight as of this encounter: 78.9 kg (174 lb).      Review of Systems   All other ROS reviewed and are negative or non-contributory except as stated in HPI.     Physical Exam   BP: 118/69  Pulse: 68  Heart Rate: " "66  Temp: 97.5  F (36.4  C)  Resp: 16  Height: 162.6 cm (5' 4\")  Weight: 78.9 kg (174 lb)  SpO2: 97 %  Physical Exam   Constitutional: She appears well-developed and well-nourished.   Very pleasant older female lying in the bed   HENT:   Head: Normocephalic and atraumatic.   Nose: Nose normal.   Mouth/Throat: Oropharynx is clear and moist.   Eyes: Conjunctivae and EOM are normal. Pupils are equal, round, and reactive to light. No scleral icterus.   No conjunctival pallor   Neck: Normal range of motion. Neck supple.   Cardiovascular: Normal rate, regular rhythm and intact distal pulses.    Sternal scar   Pulmonary/Chest: Effort normal. No respiratory distress. She has no wheezes. She exhibits tenderness (significant anterior and lateral lower rib tenderness without crepitus.  she has lateral/posterior ecchymosis).   Basilar crackles   Abdominal:   Mild left upper quadrant pain. No masses or rebound.   Musculoskeletal: Normal range of motion.   Bilateral lower extremity pitting edema with weeping from some superficial skin tears on bilateral anterior lower legs. Venous stasis changes. Pulses are palpable. No joint abnormalities.   Neurological: She is alert. No cranial nerve deficit. She exhibits normal muscle tone.   Skin: She is not diaphoretic.   Left elbow ecchymosis. Left chest wall ecchymosis posterior lateral.   Psychiatric: She has a normal mood and affect.   Vitals reviewed.      ED Course (with Medical Decision Making)    Pt seen and examined by me.  RN and EPIC notes reviewed.      Patient with lower extremity edema, crackles, abnormal labs at home and weight gain. Concern for CHF versus worsening renal insufficiency or both. Also possible concern for rib fracture or spleen injury after her fall. I am unsure of the cause of her elevated INR, but could be due to abnormal absorption coupled with renal insufficiency. Plan IV, labs, chest x-ray.     Patient has hemoglobin of 8.1 which is near her previous " hemoglobin. INR is elevated at 6.41. CMP is concerning for elevated potassium at 5.6 with increased BUN/creatinine compared with previous results. Please see Epic. She also has a mildly elevated alk phosphatase. BNP is greater than 44,000. Phosphorus is also elevated consistent with her renal failure/insufficiency.     Chest x-ray shows cardiomegaly but no evidence for rib fractures. There is a left effusion along with pulmonary edema.   I also did a left upper quadrant ultrasound to evaluate her spleen.  Pleural effusion also noted. Small cyst on the kidney. Spleen is normal.     I am concerned that we will not be able to diurese the patient without causing her more harm to her kidneys. She has pulmonary vascular congestion and increased lower extremity edema. She may need a short course of dialysis. There is no evidence of active bleeding to necessitate reversal of her Coumadin at this time. This will need to be monitored.   I spoke with the hospitalist at [facility] regarding the patient. Because we do not have dialysis available at this facility I am going to have her transferred for further management. She is aware of the plan. In this ED, she received a dose of Dilaudid for pain after her ultrasound.  She has remained stable. Family is aware of the plan. She will be transferred via ambulance.      ED Course     Procedures  Results for orders placed or performed during the hospital encounter of 07/06/17   Ribs XR, unilat 3 views + PA chest,  left    Narrative    XR RIBS & CHEST LT 3VW 7/6/2017 4:26 PM    HISTORY: Pain.    COMPARISON: None.      Impression    IMPRESSION: No radiographic evidence of acute rib fracture. Likely  small left effusion with diffuse interstitial prominence suggestive of  edema. No pneumothorax. There is cardiomegaly. Midline sternal wires  in place.    PRATIK CORNEJO MD   XR Chest 1 View    Narrative    XR CHEST 1 VW 7/6/2017 4:27 PM    HISTORY: Shortness of  breath.    COMPARISON: June 12, 2017.      Impression    IMPRESSION: There is blunting of the left costophrenic angle  suggestive of a small effusion. Diffuse interstitial prominence  suggests edema.    PRATIK CORNEJO MD   US Abdomen Limited    Narrative    US ABDOMEN LIMITED 7/6/2017 5:59 PM    HISTORY: Pain.    COMPARISON: None.      Impression    IMPRESSION: Targeted ultrasound evaluation of the left upper quadrant  demonstrates a left pleural effusion, along with a 2.2 cm cyst in the  inferior pole of the left kidney. Normal spleen. No evidence of  splenic injury appreciated.    PRATIK CORNEJO MD   CBC with platelets differential   Result Value Ref Range    WBC 8.2 4.0 - 11.0 10e9/L    RBC Count 3.51 (L) 3.8 - 5.2 10e12/L    Hemoglobin 8.1 (L) 11.7 - 15.7 g/dL    Hematocrit 27.8 (L) 35.0 - 47.0 %    MCV 79 78 - 100 fl    MCH 23.1 (L) 26.5 - 33.0 pg    MCHC 29.1 (L) 31.5 - 36.5 g/dL    RDW 21.0 (H) 10.0 - 15.0 %    Platelet Count 196 150 - 450 10e9/L    Diff Method Automated Method     % Neutrophils 70.3 %    % Lymphocytes 16.3 %    % Monocytes 8.5 %    % Eosinophils 2.8 %    % Basophils 0.9 %    % Immature Granulocytes 1.2 %    Absolute Neutrophil 5.8 1.6 - 8.3 10e9/L    Absolute Lymphocytes 1.3 0.8 - 5.3 10e9/L    Absolute Monocytes 0.7 0.0 - 1.3 10e9/L    Absolute Eosinophils 0.2 0.0 - 0.7 10e9/L    Absolute Basophils 0.1 0.0 - 0.2 10e9/L    Abs Immature Granulocytes 0.1 0 - 0.4 10e9/L   INR   Result Value Ref Range    INR 6.41 (HH) 0.86 - 1.14   Comprehensive metabolic panel   Result Value Ref Range    Sodium 147 (H) 133 - 144 mmol/L    Potassium 5.6 (H) 3.4 - 5.3 mmol/L    Chloride 123 (H) 94 - 109 mmol/L    Carbon Dioxide 13 (L) 20 - 32 mmol/L    Anion Gap 11 3 - 14 mmol/L    Glucose 149 (H) 70 - 99 mg/dL    Urea Nitrogen 84 (H) 7 - 30 mg/dL    Creatinine 4.36 (H) 0.52 - 1.04 mg/dL    GFR Estimate 10 (L) >60 mL/min/1.7m2    GFR Estimate If Black 12 (L) >60 mL/min/1.7m2    Calcium 6.9 (L) 8.5 - 10.1 mg/dL     Bilirubin Total 0.3 0.2 - 1.3 mg/dL    Albumin 2.9 (L) 3.4 - 5.0 g/dL    Protein Total 6.0 (L) 6.8 - 8.8 g/dL    Alkaline Phosphatase 225 (H) 40 - 150 U/L    ALT 42 0 - 50 U/L    AST 22 0 - 45 U/L   Nt probnp inpatient (BNP)   Result Value Ref Range    N-Terminal Pro BNP Inpatient 16643 (H) 0 - 1800 pg/mL   Phosphorus   Result Value Ref Range    Phosphorus 7.1 (H) 2.5 - 4.5 mg/dL   Magnesium   Result Value Ref Range    Magnesium 2.3 1.6 - 2.3 mg/dL     *Note: Due to a large number of results and/or encounters for the requested time period, some results have not been displayed. A complete set of results can be found in Results Review.      Medications - No data to display   Assessments & Plan     I have reviewed the findings, diagnosis, plan with the patient.    New Prescriptions    No medications on file       Final diagnoses:   Acute renal failure superimposed on stage 4 chronic kidney disease, unspecified acute renal failure type (H)   Elevated INR   Congestive heart failure, unspecified congestive heart failure chronicity, unspecified congestive heart failure type (H)   Alkaline phosphatase elevation   Rib contusion, left, initial encounter   Fall at home, initial encounter   Anemia of chronic renal failure, stage 4 (severe) (H)     Disposition: Patient transferred to [facility] in stable condition.    Note: Chart documentation done in part with Dragon Voice Recognition software. Although reviewed after completion, some word and grammatical errors may remain.     7/6/2017   [facility] EMERGENCY DEPARTMENT     Hyun Coppola MD  07/06/17 [id number]

## 2017-07-06 NOTE — ED NOTES
Pt sent here by [facility] due to elevated INR and has gained 5 lb in one day and 14 lbs on one week and she fell this week and has pain to her chest.

## 2017-07-07 ENCOUNTER — APPOINTMENT (OUTPATIENT)
Dept: CARDIOLOGY | Facility: CLINIC | Age: 77
DRG: 683 | End: 2017-07-07
Attending: INTERNAL MEDICINE
Payer: MEDICARE

## 2017-07-07 LAB
ALBUMIN SERPL-MCNC: 2.7 G/DL (ref 3.4–5)
ALBUMIN UR-MCNC: 30 MG/DL
ALP SERPL-CCNC: 197 U/L (ref 40–150)
ALT SERPL W P-5'-P-CCNC: 35 U/L (ref 0–50)
AMORPH CRY #/AREA URNS HPF: ABNORMAL /HPF
ANION GAP SERPL CALCULATED.3IONS-SCNC: 11 MMOL/L (ref 3–14)
ANION GAP SERPL CALCULATED.3IONS-SCNC: 12 MMOL/L (ref 3–14)
ANION GAP SERPL CALCULATED.3IONS-SCNC: 9 MMOL/L (ref 3–14)
APPEARANCE UR: ABNORMAL
AST SERPL W P-5'-P-CCNC: 18 U/L (ref 0–45)
BACTERIA #/AREA URNS HPF: ABNORMAL /HPF
BASE DEFICIT BLDA-SCNC: 19.1 MMOL/L
BASOPHILS # BLD AUTO: 0.1 10E9/L (ref 0–0.2)
BASOPHILS NFR BLD AUTO: 0.6 %
BILIRUB SERPL-MCNC: 0.7 MG/DL (ref 0.2–1.3)
BILIRUB UR QL STRIP: NEGATIVE
BUN SERPL-MCNC: 84 MG/DL (ref 7–30)
BUN SERPL-MCNC: 84 MG/DL (ref 7–30)
BUN SERPL-MCNC: 85 MG/DL (ref 7–30)
CALCIUM SERPL-MCNC: 6.6 MG/DL (ref 8.5–10.1)
CHLORIDE SERPL-SCNC: 124 MMOL/L (ref 94–109)
CHLORIDE SERPL-SCNC: 124 MMOL/L (ref 94–109)
CHLORIDE SERPL-SCNC: 126 MMOL/L (ref 94–109)
CHLORIDE UR-SCNC: 18 MMOL/L
CO2 SERPL-SCNC: 10 MMOL/L (ref 20–32)
CO2 SERPL-SCNC: 11 MMOL/L (ref 20–32)
CO2 SERPL-SCNC: 12 MMOL/L (ref 20–32)
COLOR UR AUTO: YELLOW
CREAT SERPL-MCNC: 4.55 MG/DL (ref 0.52–1.04)
CREAT SERPL-MCNC: 4.68 MG/DL (ref 0.52–1.04)
CREAT SERPL-MCNC: 4.68 MG/DL (ref 0.52–1.04)
DIFFERENTIAL METHOD BLD: ABNORMAL
EOSINOPHIL # BLD AUTO: 0.2 10E9/L (ref 0–0.7)
EOSINOPHIL NFR BLD AUTO: 2 %
ERYTHROCYTE [DISTWIDTH] IN BLOOD BY AUTOMATED COUNT: 20.6 % (ref 10–15)
GFR SERPL CREATININE-BSD FRML MDRD: 9 ML/MIN/1.7M2
GLUCOSE BLDC GLUCOMTR-MCNC: 107 MG/DL (ref 70–99)
GLUCOSE BLDC GLUCOMTR-MCNC: 109 MG/DL (ref 70–99)
GLUCOSE BLDC GLUCOMTR-MCNC: 125 MG/DL (ref 70–99)
GLUCOSE BLDC GLUCOMTR-MCNC: 126 MG/DL (ref 70–99)
GLUCOSE BLDC GLUCOMTR-MCNC: 173 MG/DL (ref 70–99)
GLUCOSE BLDC GLUCOMTR-MCNC: 185 MG/DL (ref 70–99)
GLUCOSE BLDC GLUCOMTR-MCNC: 83 MG/DL (ref 70–99)
GLUCOSE SERPL-MCNC: 124 MG/DL (ref 70–99)
GLUCOSE SERPL-MCNC: 192 MG/DL (ref 70–99)
GLUCOSE SERPL-MCNC: 99 MG/DL (ref 70–99)
GLUCOSE UR STRIP-MCNC: NEGATIVE MG/DL
HCO3 BLD-SCNC: 8 MMOL/L (ref 21–28)
HCT VFR BLD AUTO: 26.2 % (ref 35–47)
HGB BLD-MCNC: 7.8 G/DL (ref 11.7–15.7)
HGB UR QL STRIP: ABNORMAL
HYALINE CASTS #/AREA URNS LPF: 1 /LPF (ref 0–2)
IMM GRANULOCYTES # BLD: 0.2 10E9/L (ref 0–0.4)
IMM GRANULOCYTES NFR BLD: 1.9 %
INR PPP: 6 (ref 0.86–1.14)
INTERPRETATION ECG - MUSE: NORMAL
KETONES UR STRIP-MCNC: NEGATIVE MG/DL
LEUKOCYTE ESTERASE UR QL STRIP: ABNORMAL
LYMPHOCYTES # BLD AUTO: 1.1 10E9/L (ref 0.8–5.3)
LYMPHOCYTES NFR BLD AUTO: 13.3 %
MCH RBC QN AUTO: 23.3 PG (ref 26.5–33)
MCHC RBC AUTO-ENTMCNC: 29.8 G/DL (ref 31.5–36.5)
MCV RBC AUTO: 78 FL (ref 78–100)
MONOCYTES # BLD AUTO: 0.7 10E9/L (ref 0–1.3)
MONOCYTES NFR BLD AUTO: 8.9 %
MUCOUS THREADS #/AREA URNS LPF: PRESENT /LPF
NEUTROPHILS # BLD AUTO: 5.8 10E9/L (ref 1.6–8.3)
NEUTROPHILS NFR BLD AUTO: 73.3 %
NITRATE UR QL: NEGATIVE
NRBC # BLD AUTO: 0 10*3/UL
NRBC BLD AUTO-RTO: 0 /100
O2/TOTAL GAS SETTING VFR VENT: 21 %
PCO2 BLD: 25 MM HG (ref 35–45)
PH BLD: 7.13 PH (ref 7.35–7.45)
PH UR STRIP: 5.5 PH (ref 5–7)
PLATELET # BLD AUTO: 170 10E9/L (ref 150–450)
PO2 BLD: 87 MM HG (ref 80–105)
POTASSIUM SERPL-SCNC: 5.1 MMOL/L (ref 3.4–5.3)
POTASSIUM SERPL-SCNC: 5.5 MMOL/L (ref 3.4–5.3)
POTASSIUM SERPL-SCNC: 5.5 MMOL/L (ref 3.4–5.3)
POTASSIUM UR-SCNC: 31 MMOL/L
PROT SERPL-MCNC: 5.5 G/DL (ref 6.8–8.8)
RBC # BLD AUTO: 3.35 10E12/L (ref 3.8–5.2)
RBC #/AREA URNS AUTO: 11 /HPF (ref 0–2)
SODIUM SERPL-SCNC: 145 MMOL/L (ref 133–144)
SODIUM SERPL-SCNC: 147 MMOL/L (ref 133–144)
SODIUM SERPL-SCNC: 147 MMOL/L (ref 133–144)
SODIUM UR-SCNC: 16 MMOL/L
SP GR UR STRIP: 1.01 (ref 1–1.03)
SQUAMOUS #/AREA URNS AUTO: 1 /HPF (ref 0–1)
URN SPEC COLLECT METH UR: ABNORMAL
UROBILINOGEN UR STRIP-MCNC: NORMAL MG/DL (ref 0–2)
WBC # BLD AUTO: 7.9 10E9/L (ref 4–11)
WBC #/AREA URNS AUTO: 7 /HPF (ref 0–2)

## 2017-07-07 PROCEDURE — 40000275 ZZH STATISTIC RCP TIME EA 10 MIN

## 2017-07-07 PROCEDURE — 85025 COMPLETE CBC W/AUTO DIFF WBC: CPT | Performed by: FAMILY MEDICINE

## 2017-07-07 PROCEDURE — S0171 BUMETANIDE 0.5 MG: HCPCS | Performed by: INTERNAL MEDICINE

## 2017-07-07 PROCEDURE — A9270 NON-COVERED ITEM OR SERVICE: HCPCS | Mod: GY | Performed by: STUDENT IN AN ORGANIZED HEALTH CARE EDUCATION/TRAINING PROGRAM

## 2017-07-07 PROCEDURE — 93325 DOPPLER ECHO COLOR FLOW MAPG: CPT | Mod: 26 | Performed by: INTERNAL MEDICINE

## 2017-07-07 PROCEDURE — 93308 TTE F-UP OR LMTD: CPT | Mod: 26 | Performed by: INTERNAL MEDICINE

## 2017-07-07 PROCEDURE — 25000132 ZZH RX MED GY IP 250 OP 250 PS 637: Mod: GY | Performed by: FAMILY MEDICINE

## 2017-07-07 PROCEDURE — 25000132 ZZH RX MED GY IP 250 OP 250 PS 637: Mod: GY | Performed by: STUDENT IN AN ORGANIZED HEALTH CARE EDUCATION/TRAINING PROGRAM

## 2017-07-07 PROCEDURE — 93308 TTE F-UP OR LMTD: CPT

## 2017-07-07 PROCEDURE — 93321 DOPPLER ECHO F-UP/LMTD STD: CPT | Mod: 26 | Performed by: INTERNAL MEDICINE

## 2017-07-07 PROCEDURE — 36600 WITHDRAWAL OF ARTERIAL BLOOD: CPT

## 2017-07-07 PROCEDURE — 80048 BASIC METABOLIC PNL TOTAL CA: CPT | Performed by: STUDENT IN AN ORGANIZED HEALTH CARE EDUCATION/TRAINING PROGRAM

## 2017-07-07 PROCEDURE — 25000125 ZZHC RX 250: Performed by: FAMILY MEDICINE

## 2017-07-07 PROCEDURE — 80053 COMPREHEN METABOLIC PANEL: CPT | Performed by: FAMILY MEDICINE

## 2017-07-07 PROCEDURE — 93005 ELECTROCARDIOGRAM TRACING: CPT

## 2017-07-07 PROCEDURE — 81001 URINALYSIS AUTO W/SCOPE: CPT | Performed by: STUDENT IN AN ORGANIZED HEALTH CARE EDUCATION/TRAINING PROGRAM

## 2017-07-07 PROCEDURE — A9270 NON-COVERED ITEM OR SERVICE: HCPCS | Mod: GY | Performed by: FAMILY MEDICINE

## 2017-07-07 PROCEDURE — 25000132 ZZH RX MED GY IP 250 OP 250 PS 637: Mod: GY | Performed by: INTERNAL MEDICINE

## 2017-07-07 PROCEDURE — 12000026 ZZH R&B TRANSPLANT

## 2017-07-07 PROCEDURE — 36415 COLL VENOUS BLD VENIPUNCTURE: CPT | Performed by: FAMILY MEDICINE

## 2017-07-07 PROCEDURE — 25000131 ZZH RX MED GY IP 250 OP 636 PS 637: Mod: GY | Performed by: FAMILY MEDICINE

## 2017-07-07 PROCEDURE — 87086 URINE CULTURE/COLONY COUNT: CPT | Performed by: STUDENT IN AN ORGANIZED HEALTH CARE EDUCATION/TRAINING PROGRAM

## 2017-07-07 PROCEDURE — A9270 NON-COVERED ITEM OR SERVICE: HCPCS | Mod: GY | Performed by: INTERNAL MEDICINE

## 2017-07-07 PROCEDURE — 85610 PROTHROMBIN TIME: CPT | Performed by: FAMILY MEDICINE

## 2017-07-07 PROCEDURE — S0171 BUMETANIDE 0.5 MG: HCPCS | Performed by: FAMILY MEDICINE

## 2017-07-07 PROCEDURE — 36415 COLL VENOUS BLD VENIPUNCTURE: CPT | Performed by: STUDENT IN AN ORGANIZED HEALTH CARE EDUCATION/TRAINING PROGRAM

## 2017-07-07 PROCEDURE — 00000146 ZZHCL STATISTIC GLUCOSE BY METER IP

## 2017-07-07 PROCEDURE — 93010 ELECTROCARDIOGRAM REPORT: CPT | Performed by: INTERNAL MEDICINE

## 2017-07-07 PROCEDURE — 25000125 ZZHC RX 250: Performed by: INTERNAL MEDICINE

## 2017-07-07 PROCEDURE — 82803 BLOOD GASES ANY COMBINATION: CPT | Performed by: STUDENT IN AN ORGANIZED HEALTH CARE EDUCATION/TRAINING PROGRAM

## 2017-07-07 RX ORDER — HYDROMORPHONE HCL/0.9% NACL/PF 0.2MG/0.2
0.2 SYRINGE (ML) INTRAVENOUS ONCE
Status: DISCONTINUED | OUTPATIENT
Start: 2017-07-07 | End: 2017-07-07

## 2017-07-07 RX ORDER — SODIUM BICARBONATE 650 MG/1
1300 TABLET ORAL 3 TIMES DAILY
Status: DISCONTINUED | OUTPATIENT
Start: 2017-07-07 | End: 2017-07-11 | Stop reason: HOSPADM

## 2017-07-07 RX ORDER — BUMETANIDE 0.25 MG/ML
0.5 INJECTION INTRAMUSCULAR; INTRAVENOUS ONCE
Status: DISCONTINUED | OUTPATIENT
Start: 2017-07-07 | End: 2017-07-07

## 2017-07-07 RX ORDER — BUMETANIDE 0.25 MG/ML
1 INJECTION INTRAMUSCULAR; INTRAVENOUS ONCE
Status: COMPLETED | OUTPATIENT
Start: 2017-07-07 | End: 2017-07-07

## 2017-07-07 RX ORDER — HYDROMORPHONE HCL/0.9% NACL/PF 0.2MG/0.2
0.1 SYRINGE (ML) INTRAVENOUS ONCE
Status: DISCONTINUED | OUTPATIENT
Start: 2017-07-07 | End: 2017-07-10 | Stop reason: CLARIF

## 2017-07-07 RX ORDER — SODIUM BICARBONATE
POWDER (GRAM) MISCELLANEOUS 3 TIMES DAILY
Status: DISCONTINUED | OUTPATIENT
Start: 2017-07-07 | End: 2017-07-10

## 2017-07-07 RX ORDER — ACETAMINOPHEN 500 MG
1000 TABLET ORAL EVERY 8 HOURS PRN
Status: DISCONTINUED | OUTPATIENT
Start: 2017-07-07 | End: 2017-07-11 | Stop reason: HOSPADM

## 2017-07-07 RX ORDER — BUMETANIDE 0.25 MG/ML
1 INJECTION INTRAMUSCULAR; INTRAVENOUS EVERY 12 HOURS
Status: COMPLETED | OUTPATIENT
Start: 2017-07-07 | End: 2017-07-08

## 2017-07-07 RX ADMIN — METOPROLOL TARTRATE 25 MG: 25 TABLET, FILM COATED ORAL at 20:04

## 2017-07-07 RX ADMIN — INSULIN ASPART 1 UNITS: 100 INJECTION, SOLUTION INTRAVENOUS; SUBCUTANEOUS at 20:05

## 2017-07-07 RX ADMIN — CALCIUM ACETATE 667 MG: 667 CAPSULE ORAL at 15:19

## 2017-07-07 RX ADMIN — SODIUM BICARBONATE 650 MG TABLET 650 MG: at 14:28

## 2017-07-07 RX ADMIN — BUMETANIDE 1 MG: 0.25 INJECTION INTRAMUSCULAR; INTRAVENOUS at 01:08

## 2017-07-07 RX ADMIN — SODIUM BICARBONATE 650 MG TABLET 650 MG: at 07:48

## 2017-07-07 RX ADMIN — ACETAMINOPHEN 650 MG: 325 TABLET, FILM COATED ORAL at 03:58

## 2017-07-07 RX ADMIN — SODIUM BICARBONATE 650 MG TABLET 1300 MG: at 20:03

## 2017-07-07 RX ADMIN — INSULIN ASPART 1 UNITS: 100 INJECTION, SOLUTION INTRAVENOUS; SUBCUTANEOUS at 23:44

## 2017-07-07 RX ADMIN — Medication 2.5 MG: at 11:50

## 2017-07-07 RX ADMIN — METOPROLOL TARTRATE 25 MG: 25 TABLET, FILM COATED ORAL at 07:48

## 2017-07-07 RX ADMIN — ACETAMINOPHEN 650 MG: 325 TABLET, FILM COATED ORAL at 21:16

## 2017-07-07 RX ADMIN — PRAVASTATIN SODIUM 40 MG: 40 TABLET ORAL at 07:48

## 2017-07-07 RX ADMIN — BUMETANIDE 1 MG: 0.25 INJECTION INTRAMUSCULAR; INTRAVENOUS at 17:14

## 2017-07-07 RX ADMIN — CALCIUM ACETATE 1334 MG: 667 CAPSULE ORAL at 19:21

## 2017-07-07 ASSESSMENT — ENCOUNTER SYMPTOMS
DIARRHEA: 0
VOMITING: 0
HEADACHES: 0
WOUND: 1
CONSTIPATION: 1
APPETITE CHANGE: 1
RHINORRHEA: 1
FREQUENCY: 0
LIGHT-HEADEDNESS: 0
SHORTNESS OF BREATH: 0
DIZZINESS: 0
DIFFICULTY URINATING: 0
HEMATURIA: 0
DYSURIA: 0
COUGH: 0
NAUSEA: 0
SORE THROAT: 0
DIAPHORESIS: 0
MYALGIAS: 0
ARTHRALGIAS: 0
ABDOMINAL PAIN: 0
FEVER: 0
CHILLS: 0

## 2017-07-07 ASSESSMENT — ACTIVITIES OF DAILY LIVING (ADL): WHICH_OF_THE_ABOVE_FUNCTIONAL_RISKS_HAD_A_RECENT_ONSET_OR_CHANGE?: AMBULATION

## 2017-07-07 NOTE — PROGRESS NOTES
Care Coordinator Progress Note     Admission Date/Time:  7/6/2017  Attending MD:  Juan Lopez MD     Data  Chart reviewed, discussed with interdisciplinary team.   Patient was admitted for: Data Unavailable.    Concerns with insurance coverage for discharge needs: None.  Current Living Situation: Patient lives with family and lives with spouse.  Support System: Supportive and Involved  Services Involved: Home Care  Transportation: Family or Friend will provide  Barriers to Discharge: pending medical work up and decisision on ongoing cares needed, possible out pt and in pt HD to be initiated, will need follow up with INR clinic for coumadin management, INR over 6 upon admission,  PT OT evals due to falls at home    Coordination of Care and Referrals: Provided patient/family with options for Home Care and RNCC to follow for possible Out pt HD center.  .        Assessment  Per care team pt in work up phase.  Pt living in [place] with spouse, has walker in home and HHC RN SW with [facility].  Pt admitted due to significant changes in lab values with possible need for HD.  Nephrology following.  Dc plan hinging on medical team decisions.  PT OT to see due to falls at home, pt has been declining to get OOB with nursing due to weakness.  Pending medical improvement, strength may need TCU vs HHC.       Plan  Holding now, waiting on improvement of labs and may need HD in pt.  INR elevated.  Will need INR follow up out pt with PCP.  DC with HHC vs TCU due to fall and weakness during this admission.     Marsha Espinal, RNCC, BSN    [facility]    Medicine Group  [address] [place] [id number]    [email or web address]  [email or web address]    Office: [phone] Pager: [phone]

## 2017-07-07 NOTE — PHARMACY-ANTICOAGULATION SERVICE
Clinical Pharmacy - Warfarin Dosing Consult     Pharmacy has been consulted to manage this patient s warfarin therapy.  Indication: Atrial Fibrillation  Therapy Goal: INR 2-3  Warfarin Prior to Admission: Yes  Warfarin PTA Regimen: ADDENDUM 7/7/17: per chelsie note dated 7/6/17 warfarin home regimen is: 7.5mg on Friday and 5mg MonTueWedThurSatSun.   Below is the previus weeks doses based on anticoagulation clinic notes:      INR   Date Value Ref Range Status   07/06/2017 6.41 (HH) 0.86 - 1.14 Final     Comment:     Critical Value called to and read back by  RACHANA GAR AT 1642 SC     07/06/2017 7.4  Final       Recommend no warfarin today.      Pharmacy will monitor Kathryn Banks daily and order warfarin doses to achieve specified goal.      Please contact pharmacy as soon as possible if the warfarin needs to be held for a procedure or if the warfarin goals change.      Sindy Harding, PharmD, BCPS    Addendum 7/7/17 completed by Ramila Travis, Pharm.D., BCPS

## 2017-07-07 NOTE — PROVIDER NOTIFICATION
Patient's team was paged to notify them that the patient had critical lab results of an INR of 6.0 and CO2 of 10.

## 2017-07-07 NOTE — PLAN OF CARE
Problem: Goal Outcome Summary  Goal: Goal Outcome Summary  Outcome: No Change  AVSS on 1L NC. Pt disoriented to time occasionally, able to re-orientate easily.  126. Pt complains of abdominal pain, PRN tylenol ol given with relief. Denies nausea. Pt on telemetry. EKG done, Normal sinus rhythm. Pt voiding adequate amounts, not saved d/t bedpan spilling. No reported BM. Pt refusing to stand up, states she is to weak (orthosatic BPs not done). Sodium, chloride and potassium urine samples sent. Sodium came back 16, Chloride 18 and Potassium 31. Possible dialysis line placed today and dialyzing after.  NPO. Will continue to monitor. Call light in reach, continue with POC.

## 2017-07-07 NOTE — H&P
**See Addendum below**    [facility]  Inpatient History and Physical    Kathryn Banks MRN# [id number]   Age: 77 year old YOB: 1940     7/6/2017 10:17 PM    Home clinic: [facility]  Primary care provider: Dheeraj Jj          Chief Concern:   Abnormal labs       History is obtained from the patient          History of Present Illness (Resident / Clinician):   Kathryn Banks is a 77 year old female with history of CAD s/p CABG in 2007, A.flutter s/p ablation (06/07/17), recurrent admissions due to HFpEF (60-65% echo 06/01/17), recently admitted from 06/21-06/25, transferred from [facility] ED for abnormal labs.    Patient reports that she was sent to the ED by her [facility] nurse who noticed that the patient had gained weight. She reports that her baseline weight is about  160 lbs and that she has gained about 5 lbs in one day. Patient denies any chest pain, worsening SOB, abdominal concerns, headaches, dizziness, lightheadedness, syncope. She did fall down yesterday at home, when she was trying to reach for furniture and was unable to reach far out. Denies hitting her head or losing consciousness. Denies any recent fever or sickness.    ED course:   Chest xray negative for acute rib fracture. Small left pleural effusion, pulmonary edema.  US abdomen showed normal spleen, no evidence of injury.     Old records were  reviewed, and any additions to pertinent history are noted above.           Past Medical History:     Past Medical History:   Diagnosis Date     Carpal tunnel syndrome      Coronary atherosclerosis of native coronary artery 2006    5 vessel CABG     OBSTRUCTIVE SLEEP APNEA     using CPAP     Osteoarthrosis, unspecified whether generalized or localized, unspecified site     generalized arthritis, particularly in her hands and feet         Other and combined  forms of senile cataract 2000    Bilateral     Other and unspecified hyperlipidemia      RESTLESS LEGS SYNDROME      TENOSYNOV HAND/WRIST NEC 6/30/2006     Type II or unspecified type diabetes mellitus without mention of complication, not stated as uncontrolled 2001    Diabetes mellitus/pt is diet controlled with weight loss     Typical atrial flutter (H) 01/17/2007     Unspecified essential hypertension              Past Surgical History:     Past Surgical History:   Procedure Laterality Date     ANGIOPLASTY       C APPENDECTOMY       C CABG, VEIN, FIVE  12/06    SVG x 4 and LIMA to LAD     C SOTO W/O FACETEC FORAMOT/DSKC 1/2 VRT SEG, LUMBAR  1968     C REMV CATARACT INTRACAP,INSERT LENS      Bilateral     C TOTAL ABDOM HYSTERECTOMY  1995     - fibroids     C VAGOTOMY/PYLOROPLASTY,SELECT  1970     COLONOSCOPY  12/3/2007     COLONOSCOPY  1/17/2014    Procedure: COLONOSCOPY;  Colonoscopy;  Surgeon: Zack Stearns MD;  Location:  GI     CYSTOSCOPY  11/25/09    [facility]     ENDOSCOPY  03/21/2000    Upper GI     HC COLONOSCOPY THRU STOMA, DIAGNOSTIC  10/7/04    poor prep, repeat in 2-3 years     HC COLONOSCOPY W/WO BRUSH/WASH  10/31/07    Repeat-poor quality prep     HC REMOVAL OF OVARY/TUBE(S)      Salpingo-Oophorectomy, Unilateral     HC REVISE MEDIAN N/CARPAL TUNNEL SURG      Carpal tunnel release     HC UGI ENDOSCOPY DIAG W BIOPSY  10/31/07     HC UGI ENDOSCOPY, SIMPLE EXAM  12/3/2007     OPEN REDUCTION INTERNAL FIXATION HIP NAILING Left 7/3/2016    Procedure: OPEN REDUCTION INTERNAL FIXATION HIP NAILING;  Surgeon: Lake Schulz MD;  Location:  OR             Social History:     Social History   Substance Use Topics     Smoking status: Former Smoker     Years: 10.00     Quit date: 1/1/1969     Smokeless tobacco: Never Used     Alcohol use 0.0 oz/week     0 Standard drinks or equivalent per week      Comment: occasional- 2 drinks per month            Family History:     Family History   Problem Relation  Age of Onset     DIABETES Father      AODM     Neurologic Disorder Father      Parkinson's     Blood Disease Father       from blood clot from leg to lung immediately after hip fracture     DIABETES Paternal Grandmother      adult onset     DIABETES Maternal Grandmother      adult onset     Hypertension No family hx of      CEREBROVASCULAR DISEASE No family hx of      Family history reviewed            Immunizations:     Immunization History   Administered Date(s) Administered     HepB-Peds 08/10/1999, 10/19/1999, 2000     Hepatitis A Vac Ped/Adol-2 Dose 1998, 10/13/1998     Influenza (High Dose) 3 valent vaccine 2012, 2013, 2014, 2015, 2016     Influenza (IIV3) 2001, 10/16/2002, 11/10/2003, 10/29/2004, 2005, 2006, 2007, 2007, 2008, 10/21/2009, 10/01/2010     Meningococcal (Menomune ) 2007     Pneumococcal (PCV 13) 2016     Pneumococcal 23 valent 2008, 2014     Poliovirus, inactivated (IPV) 2007     TD (ADULT, 7+) 1996, 2000, 10/31/2010     Typhoid IM 2007     Yellow Fever 2007     Zoster vaccine, live 2011             Allergies:   Ciprofloxacin; Oxycodone; Lisinopril; Penicillins; and Sulfa drugs          Medications:     Current Facility-Administered Medications on File Prior to Encounter:  [DISCONTINUED] sodium chloride (PF) 0.9% PF flush 3 mL   [DISCONTINUED] HYDROmorphone (PF) (DILAUDID) injection 0.3-0.5 mg     Current Outpatient Prescriptions on File Prior to Encounter:  pravastatin (PRAVACHOL) 40 MG tablet Take 1 tablet (40 mg) by mouth daily   sodium bicarbonate 650 MG tablet Take 1 tablet (650 mg) by mouth 3 times daily   warfarin (COUMADIN) 2.5 MG tablet Take 1 tablet (2.5 mg) daily until INR is rechecked within 3 days (Patient taking differently:  INR 3.1 Give 1 mg daily next INR )   acetaminophen (TYLENOL) 325 MG tablet Take 2 tablets (650 mg) by mouth  every 4 hours as needed for mild pain   isosorbide mononitrate (IMDUR) 30 MG 24 hr tablet Take 1 tablet (30 mg) by mouth daily   nitroglycerin (NITROSTAT) 0.4 MG sublingual tablet Place 1 tablet (0.4 mg) under the tongue every 5 minutes as needed for chest pain   gabapentin (NEURONTIN) 300 MG capsule Take 1 capsule (300 mg) by mouth At Bedtime (Patient taking differently: Take 300 mg by mouth 2 times daily )   pramipexole (MIRAPEX) 0.5 MG tablet Take 3 tablets (1.5 mg) by mouth every evening   metoprolol (LOPRESSOR) 25 MG tablet Take 2 tablets (50 mg) by mouth 2 times daily   ferrous sulfate (IRON) 325 (65 FE) MG tablet Take 1 tablet (325 mg) by mouth daily (with breakfast)   calcium carbonate-vitamin D 500-400 MG-UNIT TABS per tablet Take 1 tablet by mouth daily   cyanocobalamin (VITAMIN B12) 1000 MCG/ML injection Inject 1 mL (1,000 mcg) into the muscle every 30 days   ORDER FOR DME Equipment being ordered: cpap machine, mask, humidifier, tubing and filters            Review of Systems:   Review of Systems   Constitutional: Positive for appetite change. Negative for chills, diaphoresis and fever.   HENT: Positive for rhinorrhea. Negative for congestion, hearing loss and sore throat.    Eyes: Positive for visual disturbance (blurry vision, double vision (chronic)).   Respiratory: Negative for cough and shortness of breath.    Cardiovascular: Positive for leg swelling. Negative for chest pain.   Gastrointestinal: Positive for constipation. Negative for abdominal pain, diarrhea, nausea and vomiting.   Genitourinary: Negative for difficulty urinating, dysuria, frequency and hematuria.   Musculoskeletal: Negative for arthralgias and myalgias.   Skin: Positive for wound (wounds on b/l lower extremities, traumatic).   Neurological: Negative for dizziness, syncope, light-headedness and headaches.               Physical Exam:   Vitals were reviewed  Temp: 97.9  F (36.6  C) Temp src: Oral BP: 102/73 Pulse: 74   Resp: 14  SpO2: 94 % O2 Device: None (Room air)    Physical Exam   Constitutional: She is oriented to person, place, and time. She appears well-developed and well-nourished. She is sleeping. She is easily aroused. No distress.   HENT:   Head: Normocephalic and atraumatic.   Right Ear: External ear normal.   Left Ear: External ear normal.   Nose: Nose normal.   Mouth/Throat: Oropharynx is clear and moist. No oropharyngeal exudate.   Eyes: Conjunctivae and EOM are normal. Pupils are equal, round, and reactive to light. No scleral icterus.   Neck: Normal range of motion. Neck supple.   Cardiovascular: Normal rate and normal heart sounds.    Pulmonary/Chest: Effort normal. No respiratory distress. She has decreased breath sounds (crackles b/l bases, left sided decreased breath sounds). She has no wheezes. She has no rales. She exhibits tenderness.   Abdominal: Soft. Bowel sounds are normal. She exhibits distension. There is no tenderness. There is no rebound.   Musculoskeletal: Normal range of motion. She exhibits edema (b/l lower extremity edema, pitting +2).   Lymphadenopathy:     She has no cervical adenopathy.   Neurological: She is oriented to person, place, and time and easily aroused. No cranial nerve deficit or sensory deficit.   Skin:   Venous stasis changes in b/l lower extremities, weeping wounds on both shins, patient says that they are traumatic (car door)              Data:     Results for orders placed or performed during the hospital encounter of 07/06/17 (from the past 24 hour(s))   CBC with platelets differential   Result Value Ref Range    WBC 8.2 4.0 - 11.0 10e9/L    RBC Count 3.51 (L) 3.8 - 5.2 10e12/L    Hemoglobin 8.1 (L) 11.7 - 15.7 g/dL    Hematocrit 27.8 (L) 35.0 - 47.0 %    MCV 79 78 - 100 fl    MCH 23.1 (L) 26.5 - 33.0 pg    MCHC 29.1 (L) 31.5 - 36.5 g/dL    RDW 21.0 (H) 10.0 - 15.0 %    Platelet Count 196 150 - 450 10e9/L    Diff Method Automated Method     % Neutrophils 70.3 %    % Lymphocytes 16.3 %     % Monocytes 8.5 %    % Eosinophils 2.8 %    % Basophils 0.9 %    % Immature Granulocytes 1.2 %    Absolute Neutrophil 5.8 1.6 - 8.3 10e9/L    Absolute Lymphocytes 1.3 0.8 - 5.3 10e9/L    Absolute Monocytes 0.7 0.0 - 1.3 10e9/L    Absolute Eosinophils 0.2 0.0 - 0.7 10e9/L    Absolute Basophils 0.1 0.0 - 0.2 10e9/L    Abs Immature Granulocytes 0.1 0 - 0.4 10e9/L   INR   Result Value Ref Range    INR 6.41 (HH) 0.86 - 1.14   Comprehensive metabolic panel   Result Value Ref Range    Sodium 147 (H) 133 - 144 mmol/L    Potassium 5.6 (H) 3.4 - 5.3 mmol/L    Chloride 123 (H) 94 - 109 mmol/L    Carbon Dioxide 13 (L) 20 - 32 mmol/L    Anion Gap 11 3 - 14 mmol/L    Glucose 149 (H) 70 - 99 mg/dL    Urea Nitrogen 84 (H) 7 - 30 mg/dL    Creatinine 4.36 (H) 0.52 - 1.04 mg/dL    GFR Estimate 10 (L) >60 mL/min/1.7m2    GFR Estimate If Black 12 (L) >60 mL/min/1.7m2    Calcium 6.9 (L) 8.5 - 10.1 mg/dL    Bilirubin Total 0.3 0.2 - 1.3 mg/dL    Albumin 2.9 (L) 3.4 - 5.0 g/dL    Protein Total 6.0 (L) 6.8 - 8.8 g/dL    Alkaline Phosphatase 225 (H) 40 - 150 U/L    ALT 42 0 - 50 U/L    AST 22 0 - 45 U/L   Nt probnp inpatient (BNP)   Result Value Ref Range    N-Terminal Pro BNP Inpatient 49216 (H) 0 - 1800 pg/mL   Phosphorus   Result Value Ref Range    Phosphorus 7.1 (H) 2.5 - 4.5 mg/dL   Magnesium   Result Value Ref Range    Magnesium 2.3 1.6 - 2.3 mg/dL   Ribs XR, unilat 3 views + PA chest,  left    Narrative    XR RIBS & CHEST LT 3VW 7/6/2017 4:26 PM    HISTORY: Pain.    COMPARISON: None.      Impression    IMPRESSION: No radiographic evidence of acute rib fracture. Likely  small left effusion with diffuse interstitial prominence suggestive of  edema. No pneumothorax. There is cardiomegaly. Midline sternal wires  in place.    PRATIK CORNEJO MD   XR Chest 1 View    Narrative    XR CHEST 1 VW 7/6/2017 4:27 PM    HISTORY: Shortness of breath.    COMPARISON: June 12, 2017.      Impression    IMPRESSION: There is blunting of the left  costophrenic angle  suggestive of a small effusion. Diffuse interstitial prominence  suggests edema.    PRATIK CORNEJO MD   US Abdomen Limited    Narrative    US ABDOMEN LIMITED 7/6/2017 5:59 PM    HISTORY: Pain.    COMPARISON: None.      Impression    IMPRESSION: Targeted ultrasound evaluation of the left upper quadrant  demonstrates a left pleural effusion, along with a 2.2 cm cyst in the  inferior pole of the left kidney. Normal spleen. No evidence of  splenic injury appreciated.    PRATIK CORNEJO MD     *Note: Due to a large number of results and/or encounters for the requested time period, some results have not been displayed. A complete set of results can be found in Results Review.      All cardiac studies reviewed by me.   All imaging studies reviewed by me.        Assessment and Plan:   Assessment:   Kathryn Banks is a 77 year old female with history of CAD s/p CABG in 2007, A.flutter s/p ablation (06/07/17), recurrent admissions due to HFpEF (60-65% echo 06/01/17), recently admitted from 06/21-06/25, transferred from [facility] ED for abnormal labs.   Patient Active Problem List   Diagnosis     Restless leg syndrome     Sleep apnea     Lipoma of other skin and subcutaneous tissue     ESOPHAGEAL REFLUX     Postsurgical aortocoronary bypass status     Iron deficiency anemia     History of peptic ulcer disease     Edema     Kidney stone     Hyperlipidemia LDL goal <100     Advanced directives, counseling/discussion     CAD - NSTEMI, CABG x 5 2007, angio in 2013 with patent grafts other than occluded graft to PL     Hx of non-ST elevation myocardial infarction (NSTEMI)     Health Care Home     Lymphedema     Anemia of chronic renal failure, stage 4 (severe) (H)     Renal failure     Osteoarthritis of hip     Non morbid obesity, unspecified obesity type     Type 2 diabetes mellitus with other circulatory complications (H)     Long-term (current) use of anticoagulants [Z79.01]     Acute renal failure  with tubular necrosis (H)     Essential hypertension with goal blood pressure less than 140/90     Atrial fibrillation with rapid ventricular response (H)     Rash - redness medial thighs     Metabolic acidosis, normal anion gap (NAG)     Pulmonary hypertension (H)     Chronic heart failure (H) with preserved EF     Atrial flutter (H) - present 6/12     Generalized edema - anasarca     Hypertensive heart and chronic kidney disease with heart failure and stage 1 through stage 4 chronic kidney disease, or unspecified chronic kidney disease (H)     Supratherapeutic INR     Proteinuria 2.1 g/g Cr     Bradycardia     Acute on chronic renal failure (H)     Memory loss     Chronic atrial fibrillation (H) on 6/12-6/13     Blood in stool     Microscopic hematuria     Acute kidney injury (nontraumatic) (H)     CHF (congestive heart failure) (H)         Plan:   ##MARTÍN on CKD  ##Hypernatremia, hyperphosphatemia, hyperkalemia  ##Non-anion gap metabolic acidosis  Patient's baseline cr was about 1.2-1.4 in Jan 2017, GFR ~35-40. Over the last few months, there has been a progressive decline in her kidney function. Thought to be multifactorial- cardiorenal vs over diuresis related ATN. Currently patient appears to be hypervolemic. Weight today 174 lbs, weight on 06/25 was 167 lbs. Objectively patient having significant lab abnormalities 2/2 her worsening kidney function- electrolyte abnormalities, non-anion gap metabolic acidosis. Possible that patient might need dialysis.     -Avoid nephrotoxic medications  -Strict I/Os  -Daily weights  -IV bumex 1 mg once  -Continue PTA sodium bicarbonate  -Nephrology consult in the AM to consider dialysis  -Urine anion gap labs pending.     ##HFpEF, 60-65% EF as per echo on 06/01/17  Patient denies orthopnea, exertional chest pain. Not in acute respiratory distress. Her BNP is elevated but hard to interpret in light of worsening Kidney injury. Does not appear to be in decompensated HF. Does have  increased weight gain and worsening LE edema.   -1 dose IV bumex given  -Decreased PTA metoprolol to 25 mg BID as patient has soft BPs  -Hold PTA imdur due to low BPs    ##AMS  Patient is somnolent. Likely multifactorial- metabolic (MARTÍN, increased BUN, electrolyte abnormalities) vs medications (patient give pain meds at the OSH). No meningeal signs, low suspicion. No focal neurologic deficit, no suspicion for CVA/TIA.   -Neuro checks Q4H  -NPO till clears  -Avoid sedative medications. Held PTA gabapentin, pramipexole    ##DM-2, last A1C 7.3 on 06/22/17  -No medications at home  -MSSI in hospital    ##Anemia of chronic disease, stable  Iron studies concerning for possible iron deficiency component.   -Continue PTA ferous sulfate.   -Continue to monitor    ##A.flutter s/p ablation 06/17  -Continue PTA metoprolol.  -On telemetry    ##HTN  -Hold PTA imdur,     Daily cares -   F: Encourage PO once patient clears  E: hypernatremia, hyperkalemia, hyperphosphatemia  N: NPO till AMS clears  Lines: PIV  Activity:-Up as tolerated  CODE:Full Code  Prophylaxis: On coumadin  PCP communication:  - Dheeraj Jj    Dispo: Expected discharge date 2-3 days pending clinical improvement.      Discussed with faculty but not examined by faculty.  MD Maegan Mishra MD MPH  PGY2- [facility], Family Medicine  Pager- [phone]      ADDENDUM    Assessment:  Kathryn Banks is a 77 year old female with history of CAD s/p CABG in 2007, A.flutter s/p ablation (06/07/17), recurrent admissions due to HFpEF (60-65% echo 06/01/17), recently admitted from 06/21-06/25, transferred from [facility] ED for management of worsening kidney function with non-anion gap metabolic acidosis, in setting of fluid overload requiring possible dialysis per nephro at [facility].    Plan:  ##MARTÍN on CKD-baseline Cr about 1.2-1.4 in Jan 2017, GFR ~35-40  ##Hypernatremia, hyperphosphatemia, hyperkalemia  ##Non-anion gap metabolic acidosis  ##Urinary  anion gap   Patient's baseline Cr was about 1.2-1.4 in Jan 2017, GFR ~35-40. Over the last few months, there has been a progressive decline in her kidney function. Thought to be multifactorial- cardiorenal vs over diuresis related ATN. Currently patient appears to be hypervolemic. Weight today 185 lbs, weight on 06/25 was 167 lbs. Currently hyperkalemic, hyperphosphatemic, non-anion gap metabolic acidosis, hypernatremia and urine anion gap present. Also considering RTA given electrolyte abnormalities, distal vs type 4. Nephrology consulted for possible dialysis given h/o worsening kidney function with aggressive diuresis. Img IV bumex x1 given with good urine output.     -Nephro consulted and following. Discussed over phone: do not anticipate starting dialysis immediately, but instead will try PO bicarb replacement and diuresis with possible dialysis if kidney function worsening with diuresis.   -will give second dose of 1mg IV bumex now and tomorrow morning. To then be reassessed by nephro  -obtain ABG   -increase sodium bicarb to 1300mg TID  -Avoid nephrotoxic medications  -Strict I/Os, goal net negative, moreira inserted for strict I/Os  -Daily weights  -calcium acetate 667mg TID    ##Hyperphosphatemia  ##Hypocalcemia  Phos elevated at 7.1 in setting if kidney failure. Corrected Ca low at 7.6  -start calcium acetate 667mg TID    ##Hyperkalemia  In setting of kidney injury as above. No T wave abnormalities on EKG. Anticipate improvement with diuresis  -continue to monitor closely    ##Elevated INR  On coumadin for Aflutter with INR supratherapeutic at 7.4 on admission, improved to 6.00 today. No signs of active bleeding. Did have a fall onto her left side with no evidence of hematoma or fracture on XR.  -hold coumadin  -2.5mg PO Vit K x1   -daily INR    ##HFpEF, 60-65% EF as per echo on 06/01/17  Patient denies orthopnea, exertional chest pain. Not in acute respiratory distress. Her BNP is elevated but hard to  interpret in light of worsening Kidney injury. Does not appear to be in decompensated HF. Does have increased weight gain and worsening LE edema.   -diuresis as above  -continue metoprolol at decreased dose of 25 mg BID as patient has soft BPs  -continue to hold PTA imdur due to low BPs    ##AMS-resolved  Patient somnolent on admission, likely multifactorial- metabolic (MARTÍN, increased BUN, electrolyte abnormalities) vs medications (received large dose of fentanyl at OSH). Alert and oriented this morning, responding appropriately to questions   -Neuro checks Q4H  -per RN, bedside swallow eval with meds good, okay to start diet   -Avoid sedative medications. Continue to hold PTA gabapentin, pramipexole    ##DM-2, last A1C 7.3 on 06/22/17  -No medications at home  -MSSI in hospital    ##Anemia of chronic disease, stable  Iron studies concerning for possible iron deficiency component.   -Continue PTA ferous sulfate.   -Continue to monitor    ##NED.flutter s/p ablation 06/17  -Continue PTA metoprolol at reduced dose as above.  -on coumadin for anticoagulation, to be held in setting of supratherapeutic INR  -On telemetry    ##HTN  -Hold PTA imdur. Plan to restart if hypertensive while admitted.      Daily cares -   F: PO  E: hypernatremia, hyperkalemia, hyperphosphatemia, hypocalcemia  N: renal diet  Lines: PIV, moreira catheter  Activity:-Up as tolerated  CODE:Full Code  Prophylaxis: On coumadin  PCP communication:  - Dheeraj Jj    Dispo: Expected discharge date 2-3 days pending clinical improvement.      Patient seen and plan discussed with attending Dr. Juan Contreras MD  Family Medicine PGY2  See sticky note for contact

## 2017-07-07 NOTE — CONSULTS
Nephrology Initial Consult  July 7, 2017      Kathryn Banks MRN:[id number] YOB: 1940  Date of Admission:7/6/2017  Primary care provider: Dheeraj Jj  Requesting physician: Juan Lopez MD    ASSESSMENT AND RECOMMENDATIONS:       1. 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  She reports some dyspnea but is only mildly hypervolemic , will monitor for response  She has severe metabolic acidosis with likely renal failure as a cause will try to manage medically and monitor for response , no emergent HD at this time      2. CAD with HFpEF and pulmonary HTN  Atrial flutter   Rate control  Management per primary  maintain SBP>100 to maintain renal perfusion    3. HTN -- currently well controlled to low BP  maintain SBP>100 to maintain renal perfusion    4. Supratherapeutic INR-- noted  No bleed evident  Management per primary    5. Hyperkalemia , Hypernatremia , Hypochloridemia, Hyperphosphatemia  With Non anion gap acidosis  Adequate resp alkalosis  Will check CK with am labs  Oral repalcement of Bicarb with 1tsp TID sodium bicarb and bicarb tabs increased to 1300TID  Am electrolytes and ABG recommended to monitor    Discussed at length with pharmacy regarding her options at this point for IV bicarb due to national shortage  She has a PH of 7.1 and that is critical for cardiac and renal recovery at this point    6. Mild volume overload  Started on Bumex 1mg IV BID   Will readjust in am after the dose and monitor response    7. Hyperphosphatemia sec to acute renal failure  With hypocalcemia  Will increase calcium acetate dose        Recommendations were communicated to primary team by phone    Seen and discussed with Dr. Ant Woods MD   [phone]      REASON FOR CONSULT:   Acute renal failure  , worsening met acidosis and volume overload, decline reanl functions    HISTORY OF PRESENT ILLNESS:  Kathryn Banks is a 77 year old with a PMH of CAD , Atrial flutter , HFpEF , severe pulm HTN, recent admission for MARTÍN with possible ATN and over diuresis with stabilized renal functions with gentle hydration. She has declining renal functions since jan 2017. She has CKD IV/V likely at baseline from recurrent MARTÍN. She was over diuresed recetly and was admitted with high fena and U Na. Her diuretics have been on hold and she was been monitored by home nurse , she is currently admitted for edema and weight gain. She was 167lbs at discharge 6/21 and has gained to 184 lbs now. Denies any resp distress.  She has increased pulm congestion on the ECHO with elevated INR from coumadin (AC for flutter)    PAST MEDICAL HISTORY:  Reviewed with patient on 07/07/2017     Past Medical History:   Diagnosis Date     Carpal tunnel syndrome      Coronary atherosclerosis of native coronary artery 2006    5 vessel CABG     OBSTRUCTIVE SLEEP APNEA     using CPAP     Osteoarthrosis, unspecified whether generalized or localized, unspecified site     generalized arthritis, particularly in her hands and feet         Other and combined forms of senile cataract 2000    Bilateral     Other and unspecified hyperlipidemia      RESTLESS LEGS SYNDROME      TENOSYNOV HAND/WRIST NEC 6/30/2006     Type II or unspecified type diabetes mellitus without mention of complication, not stated as uncontrolled 2001    Diabetes mellitus/pt is diet controlled with weight loss     Typical atrial flutter (H) 01/17/2007     Unspecified essential hypertension        Past Surgical History:   Procedure Laterality Date     ANGIOPLASTY       C APPENDECTOMY       C CABG, VEIN, FIVE  12/06    SVG x 4 and LIMA to LAD     C SOTO W/O FACETEC FORAMOT/DSKC 1/2 VRT SEG, LUMBAR  1968     C REMV CATARACT INTRACAP,INSERT LENS      Bilateral     C TOTAL ABDOM HYSTERECTOMY  1995      - fibroids     C VAGOTOMY/PYLOROPLASTY,SELECT  1970     COLONOSCOPY  12/3/2007     COLONOSCOPY  1/17/2014    Procedure: COLONOSCOPY;  Colonoscopy;  Surgeon: Zack Stearns MD;  Location:  GI     CYSTOSCOPY  11/25/09    [facility]     ENDOSCOPY  03/21/2000    Upper GI     HC COLONOSCOPY THRU STOMA, DIAGNOSTIC  10/7/04    poor prep, repeat in 2-3 years     HC COLONOSCOPY W/WO BRUSH/WASH  10/31/07    Repeat-poor quality prep     HC REMOVAL OF OVARY/TUBE(S)      Salpingo-Oophorectomy, Unilateral     HC REVISE MEDIAN N/CARPAL TUNNEL SURG      Carpal tunnel release     HC UGI ENDOSCOPY DIAG W BIOPSY  10/31/07     HC UGI ENDOSCOPY, SIMPLE EXAM  12/3/2007     OPEN REDUCTION INTERNAL FIXATION HIP NAILING Left 7/3/2016    Procedure: OPEN REDUCTION INTERNAL FIXATION HIP NAILING;  Surgeon: Lake Schulz MD;  Location:  OR        MEDICATIONS:  PTA Meds  Prior to Admission medications    Medication Sig Last Dose Taking? Auth Provider   pravastatin (PRAVACHOL) 40 MG tablet Take 1 tablet (40 mg) by mouth daily   Dheeraj Jj MD   sodium bicarbonate 650 MG tablet Take 1 tablet (650 mg) by mouth 3 times daily   Javan Sheikh MD   warfarin (COUMADIN) 2.5 MG tablet Take 1 tablet (2.5 mg) daily until INR is rechecked within 3 days  Patient taking differently: 6/19 INR 3.1 Give 1 mg daily next INR 6/26   Arash Silva MD   acetaminophen (TYLENOL) 325 MG tablet Take 2 tablets (650 mg) by mouth every 4 hours as needed for mild pain   Arash Silva MD   isosorbide mononitrate (IMDUR) 30 MG 24 hr tablet Take 1 tablet (30 mg) by mouth daily   Arash Silva MD   nitroglycerin (NITROSTAT) 0.4 MG sublingual tablet Place 1 tablet (0.4 mg) under the tongue every 5 minutes as needed for chest pain   Arash Silva MD   gabapentin (NEURONTIN) 300 MG capsule Take 1 capsule (300 mg) by mouth At Bedtime  Patient taking differently: Take 300 mg by mouth 2 times daily    Arash Silva MD   pramipexole  (MIRAPEX) 0.5 MG tablet Take 3 tablets (1.5 mg) by mouth every evening   Arash Silva MD   metoprolol (LOPRESSOR) 25 MG tablet Take 2 tablets (50 mg) by mouth 2 times daily   Arash Silva MD   ferrous sulfate (IRON) 325 (65 FE) MG tablet Take 1 tablet (325 mg) by mouth daily (with breakfast)   Arash Silva MD   calcium carbonate-vitamin D 500-400 MG-UNIT TABS per tablet Take 1 tablet by mouth daily   Arash Silva MD   cyanocobalamin (VITAMIN B12) 1000 MCG/ML injection Inject 1 mL (1,000 mcg) into the muscle every 30 days   Dheeraj Jj MD   ORDER FOR DME Equipment being ordered: cpap machine, mask, humidifier, tubing and filters   Dheeraj Jj MD      Current Meds    warfarin-No DOSE today  1 each Does not apply no dose today (warfarin)     pravastatin  40 mg Oral Daily     sodium bicarbonate  650 mg Oral TID     senna-docusate  1-2 tablet Oral BID     insulin aspart  1-6 Units Subcutaneous Q4H     metoprolol  25 mg Oral BID     Infusion Meds    - MEDICATION INSTRUCTIONS -       Warfarin Therapy Reminder         ALLERGIES:    Allergies   Allergen Reactions     Ciprofloxacin Nausea and Vomiting     Zofran did not help     Oxycodone Visual Disturbance     Delusions, blackouts      Lisinopril Cough     Penicillins Rash     Sulfa Drugs GI Disturbance     LOSS OF TASTE       REVIEW OF SYSTEMS:  A 10 point review of systems was negative except as noted above.    SOCIAL HISTORY:   Social History     Social History     Marital status:      Spouse name: Urbano Banks     Number of children: 2     Years of education: 13     Occupational History     Ministry coordinator      [employer]     Social History Main Topics     Smoking status: Former Smoker     Years: 10.00     Quit date: 1/1/1969     Smokeless tobacco: Never Used     Alcohol use 0.0 oz/week     0 Standard drinks or equivalent per week      Comment: occasional- 2 drinks per month     Drug use: No     Sexual  activity: Yes     Birth control/ protection: Surgical     Other Topics Concern     Parent/Sibling W/ Cabg, Mi Or Angioplasty Before 65f 55m? No     Social History Narrative     Reviewed with patient       FAMILY MEDICAL HISTORY:   Family History   Problem Relation Age of Onset     DIABETES Father      AODM     Neurologic Disorder Father      Parkinson's     Blood Disease Father       from blood clot from leg to lung immediately after hip fracture     DIABETES Paternal Grandmother      adult onset     DIABETES Maternal Grandmother      adult onset     Hypertension No family hx of      CEREBROVASCULAR DISEASE No family hx of      Reviewed with patient     PHYSICAL EXAM:   Temp  Av.7  F (36.5  C)  Min: 95  F (35  C)  Max: 100.6  F (38.1  C)      Pulse  Av.9  Min: 58  Max: 144 Resp  Av.9  Min: 9  Max: 39  SpO2  Av.2 %  Min: 86 %  Max: 100 %       /64 (BP Location: Left arm)  Pulse 74  Temp 98.2  F (36.8  C) (Oral)  Resp 18  Wt 84.1 kg (185 lb 6.5 oz)  SpO2 99%  BMI 31.83 kg/m2   Date 17 07 - 17 0659   Shift 1025-3094 6879-1854 5916-9457 24 Hour Total   I  N  T  A  K  E   Shift Total  (mL/kg)       O  U  T  P  U  T   Urine 500   500    Shift Total  (mL/kg) 500  (5.95)   500  (5.95)   Weight (kg) 84.1 84.1 84.1 84.1        Admit Weight: 84.1 kg (185 lb 6.5 oz)     GENERAL APPEARANCE: no acute distress, drowsy but able to understand and reply to questions  EYES: - scleral icterus, pupils equal  HENT: NC/AT,  mouth  without ulcers or lesions  Lymphatics: no cervical or supraclavicular LAD  Pulmonary: lungs clear to auscultation with equal breath sounds bilaterally, no clubbing  CV: regular rhythm, normal rate, no rub   - JVP +   - Edema+ sacral  GI: soft, nontender, normal bowel sounds, no HSM   MS: no evidence of inflammation in joints, no muscle tenderness  : no Cee, - labial edema  SKIN: no rash, warm, dry, no cyanosis  NEURO: mentation intact and speech  normal    LABS:   CMP  Recent Labs  Lab 07/07/17  0616 07/06/17  1601   * 147*   POTASSIUM 5.5* 5.6*   CHLORIDE 124* 123*   CO2 10* 13*   ANIONGAP 11 11   * 149*   BUN 85* 84*   CR 4.55* 4.36*   GFRESTIMATED 9* 10*   GFRESTBLACK 11* 12*   MALICK 6.6* 6.9*   MAG  --  2.3   PHOS  --  7.1*   PROTTOTAL 5.5* 6.0*   ALBUMIN 2.7* 2.9*   BILITOTAL 0.7 0.3   ALKPHOS 197* 225*   AST 18 22   ALT 35 42     CBC  Recent Labs  Lab 07/07/17  0616 07/06/17  1601   HGB 7.8* 8.1*   WBC 7.9 8.2   RBC 3.35* 3.51*   HCT 26.2* 27.8*   MCV 78 79   MCH 23.3* 23.1*   MCHC 29.8* 29.1*   RDW 20.6* 21.0*    196     INR  Recent Labs  Lab 07/07/17  0616 07/06/17  1601 07/06/17 07/03/17   INR 6.00* 6.41* 7.4 1.8     ABGNo lab results found in last 7 days.   URINE STUDIES  Recent Labs   Lab Test  06/22/17   0119  06/21/17   0850  06/12/17   2050  06/01/17   2045   07/02/16   0224  02/05/13   1016  01/31/13   1210  01/22/13   0921   COLOR  Yellow  Light Yellow  Colorless  Red   < >  Yellow  Yellow  Yellow  Yellow   APPEARANCE  Slightly Cloudy  Slightly Cloudy  Clear  Cloudy   < >  Clear  Clear  Clear  Clear   URINEGLC  Negative  Negative  Negative  Negative   < >  Negative  >=1000*  >=1000*  500*   URINEBILI  Negative  Negative  Negative  Negative   < >  Negative  Negative  Negative  Negative   URINEKETONE  Negative  Negative  Negative  Negative   < >  Negative  Negative  Negative  Negative   SG  1.010  1.009  1.005  1.009   < >  1.020  1.020  1.025  1.020   UBLD  Trace*  Trace*  Moderate*  Moderate*   < >  Trace*  Negative  Negative  Small*   URINEPH  5.0  5.0  5.0  5.0   < >  6.0  6.0  6.0  6.0   PROTEIN  10*  10*  Negative  100*   < >  Negative  Trace*  Trace*  30*   UROBILINOGEN   --    --    --    --    --   0.2  0.2  0.2  0.2   NITRITE  Negative  Negative  Negative  Negative   < >  Negative  Positive*  Positive*  Negative   LEUKEST  Large*  Large*  Negative  Small*   < >  Negative  Small*  Trace*  Moderate*   RBCU  4*  2-5*   26*  >182*   < >  2-5*  O - 2  2-5*  10-25*   WBCU  136*  *  3*  >182*   < >  O - 2  *  *  >100*    < > = values in this interval not displayed.     Recent Labs   Lab Test  06/21/17   0848  06/14/17   1455  06/01/17   2045   UTPG  0.89*  1.31*  2.11*     PTH  Recent Labs   Lab Test  06/21/17   0842   PTHI  567*     IRON STUDIES  Recent Labs   Lab Test  06/21/17   0842  06/14/17   1750  08/20/12   1146  06/07/12   1350   IRON  36  26*  32*  15*   FEB  209*  229*  285  323   IRONSAT  17  11*  11*  5*   GWEN  196  87   --   7*       IMAGING:  All imaging studies reviewed by me in EPIC    Jerri Woods MD

## 2017-07-07 NOTE — PHARMACY-ANTICOAGULATION SERVICE
Warfarin Therapy Hold Note  This patient is currently receiving warfarin for Atrial fibrillation.    Goal INR:  2-3.      Anticoagulation Dose History     Recent Dosing and Labs Latest Ref Rng & Units 6/24/2017 6/25/2017 6/28/2017 7/3/2017 7/6/2017 7/6/2017 7/7/2017    INR 0.86 - 1.14 3.81(H) 3.87(H) 3.4 1.8 7.4 6.41(HH) 6.00(HH)     Bleeding Signs/Symptoms:  None    Assessment:  Current INR is supratherapeutic.  This is most likely due to: stress from acute illness (heart failure).     Plan:  1) HOLD today s warfarin dose.   An order has been placed in EPIC for  Warfarin- No Dose Today    2) Do not recommend reversal with vitamin K or FFP at this time.    3) Recheck next INR tomorrow with AM labs    Hazel Leung.D., [facility]  Pager [phone]    ADDENDUM 7/7/17 1038: primary team has decided to give phytonadione 2.5mg PO.  Ramila Travis Pharm.KARLA., [facility]  Pager [phone]

## 2017-07-07 NOTE — PLAN OF CARE
Problem: Goal Outcome Summary  Goal: Goal Outcome Summary  Outcome: No Change  7135-0799: AVSS and afebrile on RA. , 107, & 83. Pt oriented x1-2, disoriented to time and sometimes disoriented to place. She stated that she feels confused, and has been forgetful. Pt stated she had some pain in her throat and ankles. Lotion applied to ankles, ice chips given for sore throat. Sodium elevated, potassium elevated, INR 6.0, creatinine 4.68, pH 7.13, and bicarb 8. Pt on renal diet. Good appetite and tolerating well. Cee placed, good urine output. UA/UC collected. Pt had a large soft bowel movement. PIV SL in right AC. Some bruising on left side from fall at home. Wound on R lower leg covered with mepelex, dressing CDI. Pt up with assist of 2, using walker. Bed alarm on. Will continue plan of care and update team as needed.

## 2017-07-07 NOTE — PROVIDER NOTIFICATION
Patient's team paged to notify them that the patient's ABG came back and had a critical low pH of 7.13 and critical low bicarb of 8.0.

## 2017-07-07 NOTE — PROGRESS NOTES
"D: Admission to 7A, transferred from outside hospital for abnormal labs and possible initiation of dialysis. Patient fell at home this morning, uses a walker for assistance. Fell onto left side and complains of left sided abdominal/flank pain. Received 100mcg fentanyl on ambulance ride down so patient very sleepy upon arrival.   Arouses to name, able to answer simple questions. When asked who is her primary care giver she said she had to think about it and could not finish her thought.  PIV in right fore arm, SL, wounds noted on left shin \"from her car door\". Bruising noted on left flank. Edema in lower extremities (+ 14 lb weight gain noted from OSH in last week).    I:settled patient in room. notified on call MD of patient arrival. Obtained VS. Completed admission profile as best as allowed by patient.     A: Patient denies nausea, some pain along left side. States some diarrhea recently, unable to elaborate.    P: await admission orders. Update MD as needed  "

## 2017-07-07 NOTE — PROGRESS NOTES
[facility]  Patient is currently open to home care services with [facility].  The patient is currently receiving RN/SW services.  [facility]  and team have been notified of patient admission.  [facility] liaison will continue to follow patient during stay.  If appropriate provide orders to resume home care at time of discharge.    Kathryn Mcnulty RN, BSN  [facility] Liaison  [phone]

## 2017-07-08 LAB
ANION GAP SERPL CALCULATED.3IONS-SCNC: 13 MMOL/L (ref 3–14)
BACTERIA SPEC CULT: NO GROWTH
BUN SERPL-MCNC: 88 MG/DL (ref 7–30)
CALCIUM SERPL-MCNC: 6.7 MG/DL (ref 8.5–10.1)
CHLORIDE SERPL-SCNC: 124 MMOL/L (ref 94–109)
CK SERPL-CCNC: 114 U/L (ref 30–225)
CO2 SERPL-SCNC: 10 MMOL/L (ref 20–32)
CREAT SERPL-MCNC: 4.75 MG/DL (ref 0.52–1.04)
GFR SERPL CREATININE-BSD FRML MDRD: 9 ML/MIN/1.7M2
GLUCOSE BLDC GLUCOMTR-MCNC: 120 MG/DL (ref 70–99)
GLUCOSE BLDC GLUCOMTR-MCNC: 132 MG/DL (ref 70–99)
GLUCOSE BLDC GLUCOMTR-MCNC: 138 MG/DL (ref 70–99)
GLUCOSE BLDC GLUCOMTR-MCNC: 142 MG/DL (ref 70–99)
GLUCOSE BLDC GLUCOMTR-MCNC: 163 MG/DL (ref 70–99)
GLUCOSE BLDC GLUCOMTR-MCNC: 204 MG/DL (ref 70–99)
GLUCOSE SERPL-MCNC: 106 MG/DL (ref 70–99)
INR PPP: 5.97 (ref 0.86–1.14)
Lab: NORMAL
MICRO REPORT STATUS: NORMAL
POTASSIUM SERPL-SCNC: 4.8 MMOL/L (ref 3.4–5.3)
SODIUM SERPL-SCNC: 147 MMOL/L (ref 133–144)
SPECIMEN SOURCE: NORMAL

## 2017-07-08 PROCEDURE — 80048 BASIC METABOLIC PNL TOTAL CA: CPT | Performed by: FAMILY MEDICINE

## 2017-07-08 PROCEDURE — 25000125 ZZHC RX 250: Performed by: INTERNAL MEDICINE

## 2017-07-08 PROCEDURE — 25000132 ZZH RX MED GY IP 250 OP 250 PS 637: Mod: GY | Performed by: FAMILY MEDICINE

## 2017-07-08 PROCEDURE — 25000132 ZZH RX MED GY IP 250 OP 250 PS 637: Mod: GY | Performed by: INTERNAL MEDICINE

## 2017-07-08 PROCEDURE — A9270 NON-COVERED ITEM OR SERVICE: HCPCS | Mod: GY | Performed by: FAMILY MEDICINE

## 2017-07-08 PROCEDURE — 25000125 ZZHC RX 250: Performed by: FAMILY MEDICINE

## 2017-07-08 PROCEDURE — 00000146 ZZHCL STATISTIC GLUCOSE BY METER IP

## 2017-07-08 PROCEDURE — 12000025 ZZH R&B TRANSPLANT INTERMEDIATE

## 2017-07-08 PROCEDURE — A9270 NON-COVERED ITEM OR SERVICE: HCPCS | Mod: GY | Performed by: INTERNAL MEDICINE

## 2017-07-08 PROCEDURE — 85610 PROTHROMBIN TIME: CPT | Performed by: FAMILY MEDICINE

## 2017-07-08 PROCEDURE — S0171 BUMETANIDE 0.5 MG: HCPCS | Performed by: INTERNAL MEDICINE

## 2017-07-08 PROCEDURE — 82550 ASSAY OF CK (CPK): CPT | Performed by: FAMILY MEDICINE

## 2017-07-08 PROCEDURE — 36415 COLL VENOUS BLD VENIPUNCTURE: CPT | Performed by: FAMILY MEDICINE

## 2017-07-08 RX ORDER — GINSENG 100 MG
CAPSULE ORAL 2 TIMES DAILY
Status: DISCONTINUED | OUTPATIENT
Start: 2017-07-08 | End: 2017-07-11 | Stop reason: HOSPADM

## 2017-07-08 RX ORDER — BUMETANIDE 0.25 MG/ML
1 INJECTION INTRAMUSCULAR; INTRAVENOUS EVERY 12 HOURS
Status: COMPLETED | OUTPATIENT
Start: 2017-07-08 | End: 2017-07-08

## 2017-07-08 RX ADMIN — METOPROLOL TARTRATE 25 MG: 25 TABLET, FILM COATED ORAL at 20:44

## 2017-07-08 RX ADMIN — BUMETANIDE 1 MG: 0.25 INJECTION INTRAMUSCULAR; INTRAVENOUS at 11:29

## 2017-07-08 RX ADMIN — SODIUM BICARBONATE 650 MG TABLET 1300 MG: at 13:50

## 2017-07-08 RX ADMIN — METOPROLOL TARTRATE 25 MG: 25 TABLET, FILM COATED ORAL at 07:57

## 2017-07-08 RX ADMIN — SODIUM BICARBONATE 650 MG TABLET 1300 MG: at 20:44

## 2017-07-08 RX ADMIN — BACITRACIN: 500 OINTMENT TOPICAL at 20:45

## 2017-07-08 RX ADMIN — CALCIUM ACETATE 1334 MG: 667 CAPSULE ORAL at 17:56

## 2017-07-08 RX ADMIN — BUMETANIDE 1 MG: 0.25 INJECTION INTRAMUSCULAR; INTRAVENOUS at 22:25

## 2017-07-08 RX ADMIN — ACETAMINOPHEN 1000 MG: 500 TABLET, FILM COATED ORAL at 09:40

## 2017-07-08 RX ADMIN — ACETAMINOPHEN 1000 MG: 500 TABLET, FILM COATED ORAL at 18:45

## 2017-07-08 RX ADMIN — INSULIN ASPART 1 UNITS: 100 INJECTION, SOLUTION INTRAVENOUS; SUBCUTANEOUS at 17:08

## 2017-07-08 RX ADMIN — SODIUM BICARBONATE: 1000 POWDER ORAL at 13:50

## 2017-07-08 RX ADMIN — BUMETANIDE 1 MG: 0.25 INJECTION INTRAMUSCULAR; INTRAVENOUS at 03:55

## 2017-07-08 RX ADMIN — CALCIUM ACETATE 1334 MG: 667 CAPSULE ORAL at 11:38

## 2017-07-08 RX ADMIN — SODIUM BICARBONATE: 1000 POWDER ORAL at 20:00

## 2017-07-08 RX ADMIN — PRAVASTATIN SODIUM 40 MG: 40 TABLET ORAL at 07:57

## 2017-07-08 RX ADMIN — CALCIUM ACETATE 1334 MG: 667 CAPSULE ORAL at 07:56

## 2017-07-08 RX ADMIN — ACETAMINOPHEN 1000 MG: 500 TABLET, FILM COATED ORAL at 01:19

## 2017-07-08 RX ADMIN — SODIUM BICARBONATE 650 MG TABLET 1300 MG: at 07:56

## 2017-07-08 NOTE — PHARMACY
D:  Patient is a 77 year old female with acute renal failure on CKD IV/V -- Nephrology ordered sodium bicarbonate powder (baking soda) at 1 tsp mixed in 50 mL of water to be given orally three times daily in addition to sodium bicarbonate tablets of 1300 mg by mouth three times daily as oral replacement of bicarbonate.      A/P:  1)  Pharmacy does not stock baking soda -- however Nutrition department does.  Was able to obtain a box for this patient to use and begin this therapy.  If additional supply is needed please obtain through Nutrition.  2)  Alerted bedside nurse that this is now available.  3)  Alerted [facility] resident that is now available.    Judson Linton, PharmD -- pager [phone]

## 2017-07-08 NOTE — PLAN OF CARE
Problem: Goal Outcome Summary  Goal: Goal Outcome Summary  Outcome: No Change  /85 (BP Location: Left arm)  Pulse 72  Temp 97.8  F (36.6  C) (Oral)  Resp 20  Wt 84.1 kg (185 lb 6.5 oz)  SpO2 95%  BMI 31.83 kg/m2     Patient VSS on RA; afebrile. , 172, and 137. C/o upper abdominal pain throughout the night and received tylenol x2. Resident on call on unit to see patient; ordered extra strength tylenol which seemed to help. Denies nausea with dinner but poor appetite on renal diet. PIV SL. Cee in place with adequate urine output. Bumex given. No BM this shift. Multiple bruises and small wounds from previous fall. Up with assist of 2 and walker. Plan to assess labs this AM and possibly dialyze. Continue with POC and notify MD with changes or concerns.

## 2017-07-08 NOTE — PROGRESS NOTES
Paged by nurse requesting patient assessment regarding left lower chest wall pain. Patient reports chest wall pain similar to when she fell 2 days ago. Denies worsening pain. Denies any worsening SOB. Denies nausea/vomiting/diarrhea. On exam, patient hemodynamically stable. Tender to touch on the lower left chest wall. Abdomen soft, non-peritonitic, no guarding/rebound/rigidity. Tylenol ordered for pain relief. Patient reports some relief with tylenol. Will order a 0.1 mg dilaudid. Advised nurse to give it if no improvement with tylenol.    Maegan Xavier MD MPH  PGY2- [facility], Family Medicine  Pager- [phone]

## 2017-07-08 NOTE — PHARMACY-ANTICOAGULATION SERVICE
Warfarin Therapy Hold Note  This patient is currently receiving warfarin for Atrial fibrillation.    Goal INR:  2-3.      Anticoagulation Dose History     Recent Dosing and Labs Latest Ref Rng & Units 6/25/2017 6/28/2017 7/3/2017 7/6/2017 7/6/2017 7/7/2017 7/8/2017    INR 0.86 - 1.14 3.87(H) 3.4 1.8 7.4 6.41(HH) 6.00(HH) 5.97(HH)    INR 0.86 - 1.14 - - - - - - -    INR Point of Care 0.86 - 1.14 - - - - - - -            Bleeding Signs/Symptoms:  None    Assessment:  Current INR is supratherapeutic.  This is most likely due to:  stress from acute illness (heart failure)    Plan:  1) HOLD today s warfarin dose.   An order has been placed in EPIC for  Warfarin- No Dose Today    2) Do not recommend reversal with vitamin K or FFP at this time.  Of note patient did receive Vit K 2.5 mg PO x1 on 7/7.  3) Recheck next INR tomorrow with AM labs    Judson Linton, PharmD -- pager [phone]

## 2017-07-08 NOTE — PLAN OF CARE
Problem: Goal Outcome Summary  Goal: Goal Outcome Summary  Outcome: No Change  5548-1872: AVSS and afebrile on RA. , 120, & 163. Pt A&Ox3. Pt experiencing some abdominal pain, given tylenolx1. Pt denies nausea. INR 5.97 and creatinine 4.75. Pt on renal diet. Good appetite and tolerating well. Cee in place, good urine output. No BM this shift. PIV SL in right AC. Some bruising on left side from fall at home. Wound on R lower leg covered with mepelex, dressing CDI, changed this morning. Pt up with assist of 2, using walker. Bed alarm on when pt is in her bed. Will continue plan of care and update team as needed.

## 2017-07-08 NOTE — PROGRESS NOTES
Patient did not receive sodium bicarb powder because hospital does not carry the powder.  Pharmacy did not know the powder to tabs conversion.  Patient did receive the bicarb tabs (1300 TID).  Will MATTHEW page nephrology.    Myrna Jj DO, PGY3  Pager [phone]
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
## 2017-07-09 LAB
ANION GAP SERPL CALCULATED.3IONS-SCNC: 11 MMOL/L (ref 3–14)
BUN SERPL-MCNC: 83 MG/DL (ref 7–30)
CALCIUM SERPL-MCNC: 6.5 MG/DL (ref 8.5–10.1)
CHLORIDE SERPL-SCNC: 121 MMOL/L (ref 94–109)
CO2 SERPL-SCNC: 17 MMOL/L (ref 20–32)
CREAT SERPL-MCNC: 4.29 MG/DL (ref 0.52–1.04)
ERYTHROCYTE [DISTWIDTH] IN BLOOD BY AUTOMATED COUNT: 20.9 % (ref 10–15)
GFR SERPL CREATININE-BSD FRML MDRD: 10 ML/MIN/1.7M2
GLUCOSE BLDC GLUCOMTR-MCNC: 120 MG/DL (ref 70–99)
GLUCOSE BLDC GLUCOMTR-MCNC: 140 MG/DL (ref 70–99)
GLUCOSE BLDC GLUCOMTR-MCNC: 148 MG/DL (ref 70–99)
GLUCOSE BLDC GLUCOMTR-MCNC: 150 MG/DL (ref 70–99)
GLUCOSE BLDC GLUCOMTR-MCNC: 210 MG/DL (ref 70–99)
GLUCOSE BLDC GLUCOMTR-MCNC: 252 MG/DL (ref 70–99)
GLUCOSE SERPL-MCNC: 142 MG/DL (ref 70–99)
HCT VFR BLD AUTO: 27 % (ref 35–47)
HGB BLD-MCNC: 8 G/DL (ref 11.7–15.7)
INR PPP: 5.91 (ref 0.86–1.14)
MCH RBC QN AUTO: 22.7 PG (ref 26.5–33)
MCHC RBC AUTO-ENTMCNC: 29.6 G/DL (ref 31.5–36.5)
MCV RBC AUTO: 77 FL (ref 78–100)
PLATELET # BLD AUTO: 218 10E9/L (ref 150–450)
POTASSIUM SERPL-SCNC: 4 MMOL/L (ref 3.4–5.3)
RBC # BLD AUTO: 3.53 10E12/L (ref 3.8–5.2)
SODIUM SERPL-SCNC: 150 MMOL/L (ref 133–144)
WBC # BLD AUTO: 7.5 10E9/L (ref 4–11)

## 2017-07-09 PROCEDURE — 36415 COLL VENOUS BLD VENIPUNCTURE: CPT | Performed by: FAMILY MEDICINE

## 2017-07-09 PROCEDURE — S0171 BUMETANIDE 0.5 MG: HCPCS | Performed by: INTERNAL MEDICINE

## 2017-07-09 PROCEDURE — 12000025 ZZH R&B TRANSPLANT INTERMEDIATE

## 2017-07-09 PROCEDURE — 25000132 ZZH RX MED GY IP 250 OP 250 PS 637: Mod: GY | Performed by: FAMILY MEDICINE

## 2017-07-09 PROCEDURE — 00000146 ZZHCL STATISTIC GLUCOSE BY METER IP

## 2017-07-09 PROCEDURE — A9270 NON-COVERED ITEM OR SERVICE: HCPCS | Mod: GY | Performed by: FAMILY MEDICINE

## 2017-07-09 PROCEDURE — A9270 NON-COVERED ITEM OR SERVICE: HCPCS | Mod: GY | Performed by: INTERNAL MEDICINE

## 2017-07-09 PROCEDURE — 25000125 ZZHC RX 250: Performed by: INTERNAL MEDICINE

## 2017-07-09 PROCEDURE — 80048 BASIC METABOLIC PNL TOTAL CA: CPT | Performed by: FAMILY MEDICINE

## 2017-07-09 PROCEDURE — 85027 COMPLETE CBC AUTOMATED: CPT | Performed by: FAMILY MEDICINE

## 2017-07-09 PROCEDURE — 25000132 ZZH RX MED GY IP 250 OP 250 PS 637: Mod: GY | Performed by: INTERNAL MEDICINE

## 2017-07-09 PROCEDURE — 85610 PROTHROMBIN TIME: CPT | Performed by: FAMILY MEDICINE

## 2017-07-09 RX ORDER — BUMETANIDE 0.25 MG/ML
0.5 INJECTION INTRAMUSCULAR; INTRAVENOUS EVERY 12 HOURS
Status: DISCONTINUED | OUTPATIENT
Start: 2017-07-09 | End: 2017-07-10

## 2017-07-09 RX ORDER — BUMETANIDE 0.25 MG/ML
1 INJECTION INTRAMUSCULAR; INTRAVENOUS EVERY 12 HOURS
Status: DISCONTINUED | OUTPATIENT
Start: 2017-07-09 | End: 2017-07-09

## 2017-07-09 RX ORDER — METOLAZONE 2.5 MG/1
2.5 TABLET ORAL ONCE
Status: COMPLETED | OUTPATIENT
Start: 2017-07-09 | End: 2017-07-09

## 2017-07-09 RX ADMIN — INSULIN ASPART 3 UNITS: 100 INJECTION, SOLUTION INTRAVENOUS; SUBCUTANEOUS at 19:48

## 2017-07-09 RX ADMIN — SODIUM BICARBONATE 650 MG TABLET 1300 MG: at 07:56

## 2017-07-09 RX ADMIN — INSULIN ASPART 1 UNITS: 100 INJECTION, SOLUTION INTRAVENOUS; SUBCUTANEOUS at 03:41

## 2017-07-09 RX ADMIN — METOPROLOL TARTRATE 25 MG: 25 TABLET, FILM COATED ORAL at 07:56

## 2017-07-09 RX ADMIN — BACITRACIN: 500 OINTMENT TOPICAL at 07:56

## 2017-07-09 RX ADMIN — CALCIUM ACETATE 1334 MG: 667 CAPSULE ORAL at 11:27

## 2017-07-09 RX ADMIN — BUMETANIDE 1 MG: 0.25 INJECTION INTRAMUSCULAR; INTRAVENOUS at 12:49

## 2017-07-09 RX ADMIN — SODIUM BICARBONATE 650 MG TABLET 1300 MG: at 19:50

## 2017-07-09 RX ADMIN — METOPROLOL TARTRATE 25 MG: 25 TABLET, FILM COATED ORAL at 19:51

## 2017-07-09 RX ADMIN — SODIUM BICARBONATE 650 MG TABLET 1300 MG: at 14:14

## 2017-07-09 RX ADMIN — ACETAMINOPHEN 1000 MG: 500 TABLET, FILM COATED ORAL at 22:46

## 2017-07-09 RX ADMIN — SODIUM BICARBONATE: 1000 POWDER ORAL at 19:51

## 2017-07-09 RX ADMIN — METOLAZONE 2.5 MG: 2.5 TABLET ORAL at 16:56

## 2017-07-09 RX ADMIN — INSULIN ASPART 2 UNITS: 100 INJECTION, SOLUTION INTRAVENOUS; SUBCUTANEOUS at 01:06

## 2017-07-09 RX ADMIN — SODIUM BICARBONATE: 1000 POWDER ORAL at 14:15

## 2017-07-09 RX ADMIN — INSULIN ASPART 1 UNITS: 100 INJECTION, SOLUTION INTRAVENOUS; SUBCUTANEOUS at 07:57

## 2017-07-09 RX ADMIN — ACETAMINOPHEN 1000 MG: 500 TABLET, FILM COATED ORAL at 05:36

## 2017-07-09 RX ADMIN — CALCIUM ACETATE 1334 MG: 667 CAPSULE ORAL at 17:40

## 2017-07-09 RX ADMIN — BACITRACIN: 500 OINTMENT TOPICAL at 19:52

## 2017-07-09 RX ADMIN — SODIUM BICARBONATE: 1000 POWDER ORAL at 07:57

## 2017-07-09 RX ADMIN — INSULIN ASPART 1 UNITS: 100 INJECTION, SOLUTION INTRAVENOUS; SUBCUTANEOUS at 16:04

## 2017-07-09 RX ADMIN — CALCIUM ACETATE 1334 MG: 667 CAPSULE ORAL at 07:56

## 2017-07-09 RX ADMIN — PRAVASTATIN SODIUM 40 MG: 40 TABLET ORAL at 07:57

## 2017-07-09 NOTE — PLAN OF CARE
Problem: Goal Outcome Summary  Goal: Goal Outcome Summary  Vitals:     07/09/17 0341 07/09/17 0537 07/09/17 0734 07/09/17 1227   BP: 130/70   145/74 (!) 169/100   BP Location: Right arm     Right arm   Pulse: 79   72 72   Resp: 18 16 16   Temp: 98.1  F (36.7  C)   98.1  F (36.7  C) 98.2  F (36.8  C)   TempSrc: Oral   Oral Oral   SpO2: 93%   94% 94%   Weight:   80.7 kg (178 lb)         Pt A/O, VSS on RA ex mild HTN. Tele NSR.  and 120. Denies pain and nausea at this time. Ambulate w/x1 assist to bathroom, c/o diarrhea and gas from sodium bicarb, providers aware. Coleman patent w/large UOP, for strict I&O. Pt tolerating renal diet. Continue to monitor and w/POC.

## 2017-07-09 NOTE — PLAN OF CARE
Problem: Goal Outcome Summary  Goal: Goal Outcome Summary  Outcome: No Change  /70 (BP Location: Right arm)  Pulse 79  Temp 98.1  F (36.7  C) (Oral)  Resp 18  Wt 81.6 kg (180 lb)  SpO2 93%  BMI 30.9 kg/m2     Patient VSS on RA; afebrile. , 210 and 148; insulin administered per order. Denies pain or nausea on regular diet. Ate two sandwiches overnight. L PIV saline locked. Cee with adequate light yellow urine output. Up to bathroom x2 with loose stools. Confused intermittently but redirectable, able to reorient. Moves with SBA and walker. Continue with POC and notify MD with changes or concerns.
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
## 2017-07-09 NOTE — PHARMACY-ANTICOAGULATION SERVICE
Warfarin Therapy Hold Note  This patient is currently receiving warfarin for Atrial fibrillation.    Goal INR:  2-3.      Anticoagulation Dose History     Recent Dosing and Labs Latest Ref Rng & Units 6/28/2017 7/3/2017 7/6/2017 7/6/2017 7/7/2017 7/8/2017 7/9/2017    INR 0.86 - 1.14 3.4 1.8 7.4 6.41(HH) 6.00(HH) 5.97(HH) 5.91(HH)    INR 0.86 - 1.14 - - - - - - -    INR Point of Care 0.86 - 1.14 - - - - - - -            Bleeding Signs/Symptoms:  None    Assessment:  Current INR is supratherapeutic.  This is most likely due to: stress from acute illness (heart failure)    Plan:  1) HOLD today s warfarin dose.   An order has been placed in EPIC for  Warfarin- No Dose Today    2) Do not recommend reversal with vitamin K or FFP at this time.  Of note patient did receive Vit K 2.5 mg PO x1 on 7/7.  3) Recheck next INR tomorrow with AM labs    Judson Linton, PharmD -- pager [phone]

## 2017-07-09 NOTE — PROGRESS NOTES
Nephrology Progress Note  07/09/2017         Assessment & Recommendations:   Kathryn Banks is a 77 year old year old female with a PMH of CAD, atrial flutter, HFpEF, severe Pulm HTN , admitted for severe metabolic acidosis , Madhavi , and weight gain with holding diuretics, has been on diuresis with bumex IV 1mg BID and oral replacements of bicarb       Today's recommendations:  Decreased bumex to 0.5mg BID due to hypernatremia  Decreased baking soda to 1/2 tsp in 8oz  No free water restriction by mouth    1. Acute renal failure on CKD IV/V,   CRS chronic Vs ESRD sec to DMII, HTN , CAD, HFpEF  Adequate response to the diuretics and she looks clinically improved  Will recommend to decrease 0.5mg Bumex IV BID for one more day with strict IO monitor and daily weights  Weight decreased by 2 lb in last 24hrs.   She is getting hypernatremic from the diuresis as well    She reports improved dyspnea symptoms and has been more active with improved energy  No emergent HD at this time     2. CAD with HFpEF and pulmonary HTN  Atrial flutter   Rate control  Management per primary  maintain SBP>100 to maintain renal perfusion     3. HTN -- currently well controlled to low BP  maintain SBP>100 to maintain renal perfusion     4. Supratherapeutic INR-- noted  No bleed evident  Management per primary     5. Hyperkalemia-- resolved ,   Hypernatremia , Hyperchloridemia,   Hyperphosphatemia  With Non anion gap acidosis  Adequate resp alkalosis for compensation  Labs noted  Oral repalcement of Bicarb decreased to 1/2tsp TID sodium bicarb and bicarb tabs at  1300TID  Am electrolytes and ABG recommended to monitor     6. Mild volume overload  decreased Bumex 0.5mg IV BID   Will readjust in am after the dose and monitor response     7. Hyperphosphatemia sec to acute renal failure  With hypocalcemia  Continue increase calcium acetate dose     Orders placed for changes in plan      Seen and discussed with Dr. Ant Woods MD    [phone]    Interval History :   In the last 24 hours Kathryn Banks has been stable and reports remarkable improvement in her breathing and energy levels and no events over night  She is eager to go home    Review of Systems:   (4 pt ROS reviewed alone is not adequate unless you detail systems you reviewed)  I reviewed the following systems:  GI: good appetite. no nausea or vomiting or diarrhea.   Neuro:  no confusion  Constitutional:  - fever or chills  CV: - dyspnea or edema.  - chest pain.    Physical Exam:   I/O last 3 completed shifts:  In: 240 [P.O.:240]  Out: 2100 [Urine:2100]   BP (!) 169/100 (BP Location: Right arm)  Pulse 72  Temp 98.2  F (36.8  C) (Oral)  Resp 16  Wt 80.7 kg (178 lb)  SpO2 94%  BMI 30.55 kg/m2     GENERAL APPEARANCE: comfortable sitting in bed , no acute distress  EYES:  - scleral icterus, pupils equal  HENT: mouth without ulcers or lesions  PULM: lungs basilar crepts to auscultation,  bilaterally, equal air movement, no clubbing  CV: regular rhythm, normal rate, no rub     -JVP + JVD     -edema +-  GI: soft, non tender, non distended, bowel sounds are +  INTEGUMENT: no cyanosis, - rash  NEURO:  - asterixis         Labs:   All labs reviewed by me  Electrolytes/Renal -   Recent Labs   Lab Test  07/09/17   0719  07/08/17   0623  07/07/17   2041   07/06/17   1601   06/23/17   0809   06/21/17   0842   05/01/17   0518   NA  150*  147*  147*   < >  147*   < >  145*   < >  146*   < >  146*   POTASSIUM  4.0  4.8  5.1   < >  5.6*   < >  4.2   < >  4.4   < >  4.7   CHLORIDE  121*  124*  124*   < >  123*   < >  119*   < >  113*   < >  122*   CO2  17*  10*  11*   < >  13*   < >  16*   < >  18*   < >  12*   BUN  83*  88*  84*   < >  84*   < >  91*   < >  107*   < >  48*   CR  4.29*  4.75*  4.68*   < >  4.36*   < >  3.97*   < >  4.26*   < >  1.93*   GLC  142*  106*  192*   < >  149*   < >  93   < >  140*   < >  174*   MALICK  6.5*  6.7*  6.6*   < >  6.9*   < >  7.8*   < >  7.6*   < >  7.2*    MAG   --    --    --    --   2.3   --   2.0   --    --    --   2.2   PHOS   --    --    --    --   7.1*   --   5.2*   --   6.0*   --    --     < > = values in this interval not displayed.       CBC -   Recent Labs   Lab Test  07/09/17   0719  07/07/17   0616  07/06/17   1601   WBC  7.5  7.9  8.2   HGB  8.0*  7.8*  8.1*   PLT  218  170  196       LFTs -   Recent Labs   Lab Test  07/07/17   0616  07/06/17   1601  06/21/17   0842  06/12/17   1250   ALKPHOS  197*  225*   --   177*   BILITOTAL  0.7  0.3   --   0.3   ALT  35  42   --   47   AST  18  22   --   42   PROTTOTAL  5.5*  6.0*   --   6.1*   ALBUMIN  2.7*  2.9*  2.9*  3.1*       Iron Panel -   Recent Labs   Lab Test  06/21/17   0842  06/14/17   1750  08/20/12   1146   IRON  36  26*  32*   IRONSAT  17  11*  11*   GWEN  196  87   --          Imaging:  All imaging studies reviewed by me.     Current Medications:    warfarin-No DOSE today  1 each Does not apply no dose today (warfarin)     bumetanide  1 mg Intravenous Q12H     bacitracin   Topical BID     sodium bicarbonate  1,300 mg Oral TID     sodium bicarbonate (antacid)   Oral TID     calcium acetate  1,334 mg Oral TID w/meals     HYDROmorphone  0.1 mg Intravenous Once     pravastatin  40 mg Oral Daily     senna-docusate  1-2 tablet Oral BID     insulin aspart  1-6 Units Subcutaneous Q4H     metoprolol  25 mg Oral BID       - MEDICATION INSTRUCTIONS -       Warfarin Therapy Reminder       Jerri Woods MD

## 2017-07-10 ENCOUNTER — CARE COORDINATION (OUTPATIENT)
Dept: CARE COORDINATION | Facility: CLINIC | Age: 77
End: 2017-07-10

## 2017-07-10 ENCOUNTER — TELEPHONE (OUTPATIENT)
Dept: FAMILY MEDICINE | Facility: CLINIC | Age: 77
End: 2017-07-10

## 2017-07-10 DIAGNOSIS — E11.59 TYPE 2 DIABETES MELLITUS WITH OTHER CIRCULATORY COMPLICATIONS (H): Primary | ICD-10-CM

## 2017-07-10 LAB
ANION GAP SERPL CALCULATED.3IONS-SCNC: 13 MMOL/L (ref 3–14)
BUN SERPL-MCNC: 70 MG/DL (ref 7–30)
CALCIUM SERPL-MCNC: 6.2 MG/DL (ref 8.5–10.1)
CHLORIDE SERPL-SCNC: 118 MMOL/L (ref 94–109)
CO2 SERPL-SCNC: 20 MMOL/L (ref 20–32)
CREAT SERPL-MCNC: 4.14 MG/DL (ref 0.52–1.04)
ERYTHROCYTE [DISTWIDTH] IN BLOOD BY AUTOMATED COUNT: 20.8 % (ref 10–15)
GFR SERPL CREATININE-BSD FRML MDRD: 10 ML/MIN/1.7M2
GLUCOSE BLDC GLUCOMTR-MCNC: 109 MG/DL (ref 70–99)
GLUCOSE BLDC GLUCOMTR-MCNC: 131 MG/DL (ref 70–99)
GLUCOSE BLDC GLUCOMTR-MCNC: 132 MG/DL (ref 70–99)
GLUCOSE BLDC GLUCOMTR-MCNC: 181 MG/DL (ref 70–99)
GLUCOSE BLDC GLUCOMTR-MCNC: 186 MG/DL (ref 70–99)
GLUCOSE BLDC GLUCOMTR-MCNC: 186 MG/DL (ref 70–99)
GLUCOSE SERPL-MCNC: 135 MG/DL (ref 70–99)
HCT VFR BLD AUTO: 27 % (ref 35–47)
HGB BLD-MCNC: 8.2 G/DL (ref 11.7–15.7)
INR PPP: 4.61 (ref 0.86–1.14)
MCH RBC QN AUTO: 23.2 PG (ref 26.5–33)
MCHC RBC AUTO-ENTMCNC: 30.4 G/DL (ref 31.5–36.5)
MCV RBC AUTO: 77 FL (ref 78–100)
PLATELET # BLD AUTO: 203 10E9/L (ref 150–450)
POTASSIUM SERPL-SCNC: 3.5 MMOL/L (ref 3.4–5.3)
RBC # BLD AUTO: 3.53 10E12/L (ref 3.8–5.2)
SODIUM SERPL-SCNC: 150 MMOL/L (ref 133–144)
WBC # BLD AUTO: 6.7 10E9/L (ref 4–11)

## 2017-07-10 PROCEDURE — 25000132 ZZH RX MED GY IP 250 OP 250 PS 637: Mod: GY | Performed by: FAMILY MEDICINE

## 2017-07-10 PROCEDURE — 25000125 ZZHC RX 250: Performed by: INTERNAL MEDICINE

## 2017-07-10 PROCEDURE — S0171 BUMETANIDE 0.5 MG: HCPCS | Performed by: INTERNAL MEDICINE

## 2017-07-10 PROCEDURE — 85027 COMPLETE CBC AUTOMATED: CPT | Performed by: FAMILY MEDICINE

## 2017-07-10 PROCEDURE — 00000146 ZZHCL STATISTIC GLUCOSE BY METER IP

## 2017-07-10 PROCEDURE — 25000132 ZZH RX MED GY IP 250 OP 250 PS 637: Mod: GY | Performed by: INTERNAL MEDICINE

## 2017-07-10 PROCEDURE — 85610 PROTHROMBIN TIME: CPT | Performed by: FAMILY MEDICINE

## 2017-07-10 PROCEDURE — A9270 NON-COVERED ITEM OR SERVICE: HCPCS | Mod: GY | Performed by: FAMILY MEDICINE

## 2017-07-10 PROCEDURE — 80048 BASIC METABOLIC PNL TOTAL CA: CPT | Performed by: FAMILY MEDICINE

## 2017-07-10 PROCEDURE — 36415 COLL VENOUS BLD VENIPUNCTURE: CPT | Performed by: FAMILY MEDICINE

## 2017-07-10 PROCEDURE — A9270 NON-COVERED ITEM OR SERVICE: HCPCS | Mod: GY | Performed by: INTERNAL MEDICINE

## 2017-07-10 PROCEDURE — 12000025 ZZH R&B TRANSPLANT INTERMEDIATE

## 2017-07-10 RX ORDER — METOPROLOL TARTRATE 50 MG
50 TABLET ORAL 2 TIMES DAILY
Status: DISCONTINUED | OUTPATIENT
Start: 2017-07-10 | End: 2017-07-11 | Stop reason: HOSPADM

## 2017-07-10 RX ORDER — ISOSORBIDE MONONITRATE 30 MG/1
30 TABLET, EXTENDED RELEASE ORAL DAILY
Status: DISCONTINUED | OUTPATIENT
Start: 2017-07-11 | End: 2017-07-11

## 2017-07-10 RX ORDER — FUROSEMIDE 40 MG
40 TABLET ORAL DAILY
Status: DISCONTINUED | OUTPATIENT
Start: 2017-07-11 | End: 2017-07-11 | Stop reason: HOSPADM

## 2017-07-10 RX ADMIN — SODIUM BICARBONATE 650 MG TABLET 1300 MG: at 13:22

## 2017-07-10 RX ADMIN — BUMETANIDE 0.5 MG: 0.25 INJECTION INTRAMUSCULAR; INTRAVENOUS at 13:05

## 2017-07-10 RX ADMIN — SODIUM BICARBONATE: 1000 POWDER ORAL at 10:00

## 2017-07-10 RX ADMIN — SODIUM BICARBONATE: 1000 POWDER ORAL at 13:22

## 2017-07-10 RX ADMIN — BUMETANIDE 0.5 MG: 0.25 INJECTION INTRAMUSCULAR; INTRAVENOUS at 00:23

## 2017-07-10 RX ADMIN — METOPROLOL TARTRATE 25 MG: 25 TABLET, FILM COATED ORAL at 08:01

## 2017-07-10 RX ADMIN — INSULIN ASPART 1 UNITS: 100 INJECTION, SOLUTION INTRAVENOUS; SUBCUTANEOUS at 13:02

## 2017-07-10 RX ADMIN — BACITRACIN: 500 OINTMENT TOPICAL at 10:00

## 2017-07-10 RX ADMIN — CALCIUM ACETATE 1334 MG: 667 CAPSULE ORAL at 07:51

## 2017-07-10 RX ADMIN — SODIUM BICARBONATE 650 MG TABLET 1300 MG: at 20:17

## 2017-07-10 RX ADMIN — SODIUM BICARBONATE 650 MG TABLET 1300 MG: at 07:59

## 2017-07-10 RX ADMIN — METOPROLOL TARTRATE 50 MG: 50 TABLET ORAL at 20:16

## 2017-07-10 RX ADMIN — PRAVASTATIN SODIUM 40 MG: 40 TABLET ORAL at 08:00

## 2017-07-10 RX ADMIN — CALCIUM ACETATE 1334 MG: 667 CAPSULE ORAL at 17:38

## 2017-07-10 RX ADMIN — BACITRACIN: 500 OINTMENT TOPICAL at 20:17

## 2017-07-10 RX ADMIN — CALCIUM ACETATE 1334 MG: 667 CAPSULE ORAL at 13:03

## 2017-07-10 NOTE — PROGRESS NOTES
[facility]  Patient is currently open to home care services with [facility].  The patient is currently receiving RN  And telehealth monitoring services.  [facility]  and team have been notified of patient admission.  [facility] liaison will continue to follow patient during stay.  If appropriate provide orders to resume home care at time of discharge.    Leigh Morales RN   [facility] Liaison

## 2017-07-10 NOTE — TELEPHONE ENCOUNTER
ED / Discharge Outreach Protocol    Patient Contact    Attempt # 1    Was call answered?  No.  Unable to leave message.

## 2017-07-10 NOTE — TELEPHONE ENCOUNTER
Patient called to schedule appointment for a hospital follow-up    Patient was admitted to [facility]:  7/6/17  Discharged date: 7/10/17  Reason for hospital admission:  Fluid overload, renal status impaired, did not do dialysis - will see nephrology tomorrow  Does patient have future appointment scheduled with provider? No  Date of future appointment:  Needs to be seen on Friday afternoon.  Please call patient    This information will be used to help the care team plan for the patients upcoming visit.  The triage RN may determine that a follow up call is necessary and reach out to the patient via the phone number listed in the chart.     Please route this message on routine priority to the Triage RN pool.

## 2017-07-10 NOTE — PROGRESS NOTES
CLINICAL NUTRITION SERVICES    Reason for Assessment:  Renal and heart healthy nutrition education    Diet History:  Pt reports no history of receiving renal/heart healthy nutrition education in the past.    Nutrition Diagnosis:  Food- and nutrition-related knowledge deficit r/t no previous knowledge of renal/heart healthy diet AEB pt report of no previous formal renal/heart healthy nutrition education.    Nutrition Prescription/Recs:  Continue renal diet (non-dialysis)    Interventions:  Nutrition Education  1.  Provided verbal instruction on a renal & heart-healthy diet with emphasis on controlling potassium, phosphorus, sodium, and protein intake.  2.  Provided the following handouts:  Kidney Disease and Protein, Controlling Your Potassium Intake, Controlling Your Phosphorus Intake, Foods and Drinks Low in Potassium and Phosphorus, Tips for Heart Healthy Eating, Seasoning Your Foods Without Adding Salt, and Recipe resources for kidney friendly cooking.      Goals:    Pt will verbalize at least three high potassium and three high phosphorus foods to limit/avoid and explain the importance of avoiding added salt to foods for cooking or seasoning.     Follow-up:   Patient to ask any further nutrition-related questions before discharge.  In addition, pt may request outpatient RD appointment.    Nati Christianson RD, LD  6D/7A pager: [phone]

## 2017-07-10 NOTE — PHARMACY-ANTICOAGULATION SERVICE
Warfarin Therapy Hold Note  This patient is currently receiving warfarin for Atrial fibrillation.    Goal INR:  2-3.      Anticoagulation Dose History     Recent Dosing and Labs Latest Ref Rng & Units 7/3/2017 7/6/2017 7/6/2017 7/7/2017 7/8/2017 7/9/2017 7/10/2017    INR 0.86 - 1.14 1.8 7.4 6.41(HH) 6.00(HH) 5.97(HH) 5.91(HH) 4.61(H)    INR 0.86 - 1.14 - - - - - - -    INR Point of Care 0.86 - 1.14 - - - - - - -          Bleeding Signs/Symptoms:  None    Assessment:  Current INR is supratherapeutic.  This is most likely due to: stress from acute illness (heart failure).     Plan:  1) HOLD today s warfarin dose.   An order has been placed in EPIC for  Warfarin- No Dose Today    2) Doo not recommend reversal with vitamin K or FFP at this time.  (PO vitamin K 2.5mg given 7/7)  3) Recheck next INR tomorrow with AM labs    Ramila Travis, Pharm.D., BCPS  Pager [phone]

## 2017-07-10 NOTE — PLAN OF CARE
Problem: Goal Outcome Summary  Goal: Goal Outcome Summary  Outcome: No Change  Vital signs stable except patient hypertensive; receiving metoprolol. Patient answers orientation questions appropriately but at times seems forgetful. Up with standby assistance and walker; ambulated in halls once. No complaints of pain or nausea. INR 4.61; order to hold coumadin; tele D/c'd. Tolerating renal diet. BG checks 132, 186. PIV intact SL. Cee in place with clear yellow urine. Having loose stools. BLE red and edematous; MD cancelled WOC orders and lymphedema due to improvement. Will continue to monitor.

## 2017-07-10 NOTE — PLAN OF CARE
Problem: Goal Outcome Summary  Goal: Goal Outcome Summary  Outcome: Improving  /84 (BP Location: Right arm)  Pulse 79  Temp 98.7  F (37.1  C) (Oral)  Resp 16  Wt 80.7 kg (178 lb)  SpO2 93%  BMI 30.55 kg/m2     Patient VSS on RA; Afberile. Tele showing NSR.  and 131, no insulin required.  Good appetite on renal diet, tolerating well. Using heat pack for R sided pain. PIV saline locked. Cee with > 1L yellow urine output. No BM this shift. BLE red and edematous, weeping at times. Up with SBA and walker. Continue with POC and notify MD with changes or concerns.

## 2017-07-10 NOTE — PROGRESS NOTES
[facility] - Inpatient daily progress note    Date of admission: 7/6/2017  Date of service: 7/10/2017         Assessment and Plan:   Assessment:   Kathryn Banks is a 77 year old female with history of CAD s/p CABG in 2007, A.flutter s/p ablation (06/07/17), recurrent admissions due to HFpEF (60-65% echo 06/01/17), transferred from [facility] ED for management of worsening kidney function with non-anion gap metabolic acidosis due to aggressive diuresis in setting of fluid overload , possibly requiring dialysis. Currently being managed with diuresis with no need for dialysis at this point, with improving kidney function and resolution of most electrolyte abnormalities.  Discharge pending nephrology recommendations.  Patient Active Problem List   Diagnosis     Restless leg syndrome     Sleep apnea     Lipoma of other skin and subcutaneous tissue     ESOPHAGEAL REFLUX     Postsurgical aortocoronary bypass status     Iron deficiency anemia     History of peptic ulcer disease     Edema     Kidney stone     Hyperlipidemia LDL goal <100     Advanced directives, counseling/discussion     CAD - NSTEMI, CABG x 5 2007, angio in 2013 with patent grafts other than occluded graft to PL     Hx of non-ST elevation myocardial infarction (NSTEMI)     Health Care Home     Lymphedema     Anemia of chronic renal failure, stage 4 (severe) (H)     Renal failure     Osteoarthritis of hip     Non morbid obesity, unspecified obesity type     Type 2 diabetes mellitus with other circulatory complications (H)     Long-term (current) use of anticoagulants [Z79.01]     Acute renal failure with tubular necrosis (H)     Essential hypertension with goal blood pressure less than 140/90     Atrial fibrillation with rapid ventricular response (H)     Rash - redness medial thighs     Metabolic acidosis, normal anion gap (NAG)     Pulmonary hypertension (H)     Chronic heart failure (H) with  preserved EF     Atrial flutter (H) - present 6/12     Generalized edema - anasarca     Hypertensive heart and chronic kidney disease with heart failure and stage 1 through stage 4 chronic kidney disease, or unspecified chronic kidney disease (H)     Supratherapeutic INR     Proteinuria 2.1 g/g Cr     Bradycardia     Acute on chronic renal failure (H)     Memory loss     Chronic atrial fibrillation (H) on 6/12-6/13     Blood in stool     Microscopic hematuria     Acute kidney injury (nontraumatic) (H)     CHF (congestive heart failure) (H)      Plan:   ##MARTÍN on CKD- baseline Cr about 1.2-1.4 in Jan 2017, GFR ~35-40  ##Non-anion gap metabolic acidosis-resolved  Currently being managed with diuresis and bicarb replacement with improvement in renal function and bicarb now within normal limits. Adequate response to diuresis and clinically improving. Weight has decreased from 84.1kg (7/7) to 78.5kg (7/10). Dialysis not required.    - Nephro consulted, appreciate recs. Continuing medical management, no dialysis for now.  - bumex decreased to 0.5mg BID from 1mg BID  - decreased baking soda to 1/2 tsp in 8oz  - continue sodium bicarb tabs 1300mg TID   - Avoid nephrotoxic medications  - Strict I/Os (moreira in place), goal net negative  - Daily weights  - daily BMP  -nutrition consult placed for education on diet for both renal and cardiac protection upon discharge     ##Hypernatremia-worsening  Na has been increasing in setting of diuresis  -bumex dose decreased to 0.5mg BID today, anticipate improvement in Na with this change  -daily BMP     ##Hyperphosphatemia  ##Hypocalcemia  Phos elevated in setting of kidney failure. Corrected Ca low at 7.7.  - continue calcium acetate 1334mg TID  -recheck Phos and calcium in AM     ##Hyperkalemia: resolved  In setting of kidney injury as above. No T wave abnormalities on EKG. Now resolved with diuresis  - daily BMP     ##Supratherapeutic INR-goal 2-3  On coumadin for Aflutter. INR  remains supratherapeutic. S/P 2.5mg PO Vit K x 1. No signs of active bleeding.  - continue to hold coumadin  - daily INR while inpatient      ##HFpEF, 60-65% EF as per echo on 06/01/17-stable  Adequate response in urine output with diuresis. Weight has been decreasing. No acute resp distress, no complaints of chest pain.   - diuresis as above  - increase metoprolol back to PTA dose of 50mg BID  - continue to hold PTA imdur, consider restarting tomorrow AM    ##Left leg wound  ##Lower extremity edema  Improving with local wound cares. WOC and lymphedema consults no longer required. Edema improving with diuresis  - continue topical bacitracin BID     ##AMS-resolved  Patient somnolent on admission, likely multifactorial- metabolic (MARTÍN, increased BUN, electrolyte abnormalities) vs medications (received large dose of fentanyl at OSH). Remains alert and oriented this morning, responding appropriately to questions   - Avoid sedative medications.   - consider resuming gabapentin and pramipexole since AMS resolved-- discuss with Pharm tomorrow     ##DM-2, last A1C 7.3 on 06/22/17  - No medications at home  - MSSI in hospital     ##Anemia of chronic disease, stable  Iron studies concerning for possible iron deficiency component.   - Continue PTA ferous sulfate.   - Continue to monitor     ##A.flutter s/p ablation 06/17  - increase metoprolol to 50mg BID as above  - On coumadin for anticoagulation, to be held in setting of supratherapeutic INR  - okay to discontinue telemetry     ##HTN  -Hold PTA imdur. Plan to restart tomorrow AM  -metoprolol 50mg BID     Fluids: PO intake  Electrolytes: hypernatremia, hyperkalemia (resolved), hyperphosphatemia   Nutrition: renal diet  Lines: PIV  Activity: -Up as tolerated  CODE: Full Code  Prophylaxis:  On coumadin  PCP communication:  - Dheeraj Jj Contacted at admission: No  - Concerns or updates: no  Dispo:  2-3 days pending renal function and plan from nephrology             Interval History:   Kathryn Banks is feeling well. No complaints today, wanting to go home. Denies shortness of breath or chest pain. Has been spending time out of bed, ambulating in the halls.             Review of Systems:   Constitutional: no fever, chills  Respiratory: no cough, no shortness of breath  Chest: no chest pain  Abd: no abdominal pain          Physical Exam (Resident / Clinician):   Vitals were reviewed  Temp: 98.3  F (36.8  C) Temp src: Oral BP: (!) 152/102 Pulse: 86 Heart Rate: 81 Resp: 16 SpO2: 95 % O2 Device: None (Room air)      Constitutional: Alert, oriented. She appears well-developed and well-nourished.  No distress.  Sitting up in her chair.  Cardiovascular: Normal rate.  Regular rhythm. Systolic murmur noted.  Pulmonary/Chest: Effort normal. Anterior lung fields clear to ausculation    Abdominal: Soft. Nondistended.  Nontender.  Musculoskeletal: b/l lower extremity edema, improving  Skin: Venous stasis changes in b/l lower extremities. 3cm wound LLE healing well, clean, dry, no purulent drainage  Neurological: She is oriented to person, place, and time.  Intake/Output Summary (Last 24 hours) at 07/10/17 1414  Last data filed at 07/10/17 1300   Gross per 24 hour   Intake              720 ml   Output             4800 ml   Net            -4080 ml           Data:   ROUTINE LABS (Last four results)  CMP    Recent Labs  Lab 07/10/17  0701 07/09/17  0719 07/08/17  0623 07/07/17  2041  07/07/17  0616 07/06/17  1601   * 150* 147* 147*  < > 145* 147*   POTASSIUM 3.5 4.0 4.8 5.1  < > 5.5* 5.6*   CHLORIDE 118* 121* 124* 124*  < > 124* 123*   CO2 20 17* 10* 11*  < > 10* 13*   ANIONGAP 13 11 13 12  < > 11 11   * 142* 106* 192*  < > 124* 149*   BUN 70* 83* 88* 84*  < > 85* 84*   CR 4.14* 4.29* 4.75* 4.68*  < > 4.55* 4.36*   GFRESTIMATED 10* 10* 9* 9*  < > 9* 10*   GFRJOSE 13* 12* 11* 11*  < > 11* 12*   MALICK 6.2* 6.5* 6.7* 6.6*  < > 6.6* 6.9*   MAG  --   --   --   --   --   --  2.3    PHOS  --   --   --   --   --   --  7.1*   PROTTOTAL  --   --   --   --   --  5.5* 6.0*   ALBUMIN  --   --   --   --   --  2.7* 2.9*   BILITOTAL  --   --   --   --   --  0.7 0.3   ALKPHOS  --   --   --   --   --  197* 225*   AST  --   --   --   --   --  18 22   ALT  --   --   --   --   --  35 42   < > = values in this interval not displayed.  CBC    Recent Labs  Lab 07/10/17  0701 07/09/17  0719 07/07/17  0616 07/06/17  1601   WBC 6.7 7.5 7.9 8.2   RBC 3.53* 3.53* 3.35* 3.51*   HGB 8.2* 8.0* 7.8* 8.1*   HCT 27.0* 27.0* 26.2* 27.8*   MCV 77* 77* 78 79   MCH 23.2* 22.7* 23.3* 23.1*   MCHC 30.4* 29.6* 29.8* 29.1*   RDW 20.8* 20.9* 20.6* 21.0*    218 170 196     INR    Recent Labs  Lab 07/10/17  0701 07/09/17  0719 07/08/17  0623 07/07/17  0616   INR 4.61* 5.91* 5.97* 6.00*     CRPNo lab results found in last 7 days.    Blood culture:  Invalid input(s): BC   Urine culture:  No results for input(s): URC in the last 168 hours.  All cultures:    Recent Labs  Lab 07/07/17  1600   CULT No growth         Medications:     Current Facility-Administered Medications   Medication     warfarin-No DOSE today     metoprolol (LOPRESSOR) tablet 50 mg     bumetanide (BUMEX) injection 0.5 mg     bacitracin ointment     sodium bicarbonate tablet 1,300 mg     sodium bicarbonate (antacid) powder     calcium acetate (PHOSLO) capsule 1,334 mg     acetaminophen (TYLENOL) tablet 1,000 mg     gabapentin (NEURONTIN) capsule 300 mg     nitroGLYcerin (NITROSTAT) sublingual tablet 0.4 mg     pramipexole (MIRAPEX) tablet 1.5 mg     pravastatin (PRAVACHOL) tablet 40 mg     naloxone (NARCAN) injection 0.1-0.4 mg     Patient is already receiving anticoagulation with heparin, enoxaparin (LOVENOX), warfarin (COUMADIN)  or other anticoagulant medication     senna-docusate (SENOKOT-S;PERICOLACE) 8.6-50 MG per tablet 1-2 tablet     ondansetron (ZOFRAN-ODT) ODT tab 4 mg    Or     ondansetron (ZOFRAN) injection 4 mg     Warfarin Therapy Reminder (Check  START DATE - warfarin may be starting in the FUTURE)     glucose 40 % gel 15-30 g    Or     dextrose 50 % injection 25-50 mL    Or     glucagon injection 1 mg     insulin aspart (NovoLOG) inj (RAPID ACTING)     [Rx hold ] isosorbide mononitrate (IMDUR) 24 hr tablet 30 mg       Caring Physician: Damian Contreras MD  [facility]  Pager Contact: see Physician sticky note

## 2017-07-10 NOTE — PROGRESS NOTES
Care Coordinator- Discharge Planning     Admission Date/Time:  7/6/2017  Attending MD:  Adryan Damian MD     Data    Chart reviewed, discussed with interdisciplinary team.   Patient was admitted for:  CHF, RVR, and MARTÍN   Assessment  Full assessment completed in previous note    Coordination of Care and Referrals: Provided patient/family with options for Home Care.     Met with pt in her room.  She is up with walker and states she is feeling very well today.  Per care team rounds, Pt to dc home today with close follow up with nephrology.  Pt agrees with possible dc today, would like [facility] care resumed with [facility].  Pt has appointment tomorrow in clinic.  [facility]CC contacted pcp clinic, they will call pt to obtain a follow up pcp appointment.  Next INR is due Friday-per care team.  C orders placed and pt to dc to home, updated [facility] on dc today.   Pt will call her  once she knows she is definitely dcing.      Plan  Dc to home    _______________________  [facility]  Phone  [phone]  Fax  [phone]  ______________________       CTS Handoff completed:  YES     Marsha Espinal, ROSARIOCC, BSN    [facility]    Medicine [facility]  [address] [place] [address]    [email or web address]  [email or web address]    Office: [phone] Pager: [phone]

## 2017-07-10 NOTE — TELEPHONE ENCOUNTER
Dr. Jj, you only have [place] ONLY spots FRiday afternoon, 7/14/17. Would you be willing to work this patient in one of those spots for [facility]. She is being discharged today. ..........ROSARIO Campos

## 2017-07-10 NOTE — PROGRESS NOTES
Nephrology Progress Note  07/10/2017         Assessment & Recommendations:   Kathryn Banks is a 77 year old year old female with a PMH of CAD, atrial flutter, HFpEF, severe Pulm HTN , admitted for severe metabolic acidosis , MARTÍN , and weight gain who has improved with diuresis and bicarb and is begin transitioned back to oral diuretics in preparation for discharge.    Today's recommendations:  Discontinue bumex   Discontinue sodium bicarb  Initiate furosemide 40 mg qD PD  Encouraged PO water intake      1. Acute renal failure on CKD IV/V: CRS chronic Vs ESRD sec to DMII, HTN , CAD, HFpEF.   Cr has been slowly improving. Responding well to diuretics and looks clinically improved today with evidence of being euvolemic on exam. No dyspnea or other signs/sx of volume overload. As patient is looking much better and reports feeling much improved, feel it is reasonable to transition from IV to PO diuretics. OK for DC from our standpoint, but should have close follow up with regular nephrologist.   - DC bumex   - Resume PTA 40 mg lasix qD PO  - Close follow up with Dr. Lerma   - We will arrange this  - Repeat labs in 3 days     2. CAD with HFpEF and pulmonary HTN  Atrial flutter   Rate control  Management per primary  maintain SBP>100 to maintain renal perfusion     3. HTN --   - maintain SBP>100 to maintain renal perfusion     4. Supratherapeutic INR-- noted  No bleed evident  Management per primary     5. Electrolytes:  Hyperkalemia-- resolved ,   Hypernatremia , Hyperchloridemia, Hyperphosphatemia  With Non anion gap acidosis  Adequate resp alkalosis for compensation  - Continue oral repalcement of bicarb tabs at  1300TID  - Discontinue sodium bicarb   - Encouraged adequate PO water intake to improve hypernatremia  - Hypernatremia should also improve with DC of sodium bicarb     6. Mild volume overload: improved from previous. Feel that patient's ideal dry weight likely between 165-167, though patient states she  feels quite well, has no difficulty breathing, and has significantly decreased swelling from before at current weight of 173. As such, feel it is reasonable to discharge at this weight with clear instructions to take extra furosemide in event of rapid weight gain.  - DC IV bumetadine  - Resume PTA 40 mg qD furosemide  - Pt to weigh self daily   - if increase in 2 pounds in one day, patient to take extra 40 mg furosemide     7. Hyperphosphatemia sec to acute renal failure  With hypocalcemia  - Continue calcium acetate     Nephrology to sign off at this time.    Patient seen and discussed with attending phsyician Dr. Helen Rosales MD   [phone]    Interval History :   In the last 24 hours Kathryn Banks has been stable with no acute overnight events. She reports that she is currently feeling fairly well. Does note some nausea after the baking soda. She feels her breathing is quiet good. She also feels like her overall swelling is much improved from previously as well. She is anxious to go home.    Review of Systems:   I reviewed the following systems:  GI: good appetite. Some nausea.   Neuro:  no confusion  Constitutional:  - fever or chills  CV: - dyspnea or edema.  - chest pain.    Physical Exam:   I/O last 3 completed shifts:  In: 840 [P.O.:840]  Out: 4125 [Urine:4125]   /70 (BP Location: Right arm)  Pulse 86  Temp 98.4  F (36.9  C) (Oral)  Resp 16  Wt 80.7 kg (178 lb)  SpO2 94%  BMI 30.55 kg/m2     GENERAL APPEARANCE: comfortable sitting in recliner , no acute distress, pleasant  EYES:  - scleral icterus, pupils equal  HENT: mouth without ulcers or lesions  PULM: anterior lung fields clear to ausculation, equal air movement, no clubbing; breathing comfortably on RA  CV: regular rhythm, normal rate, no rub     -JVP not visualized     - no LE edema  GI: soft, non tender, non distended, bowel sounds are +  INTEGUMENT: no cyanosis, - rash; evidence of chronic venous stasis changes  noted on b/l lower legs  NEURO:  A&Ox3        Labs:   All labs reviewed by me  Electrolytes/Renal -   Recent Labs   Lab Test  07/10/17   0701  07/09/17   0719  07/08/17   0623   07/06/17   1601   06/23/17   0809   06/21/17   0842   05/01/17   0518   NA  150*  150*  147*   < >  147*   < >  145*   < >  146*   < >  146*   POTASSIUM  3.5  4.0  4.8   < >  5.6*   < >  4.2   < >  4.4   < >  4.7   CHLORIDE  118*  121*  124*   < >  123*   < >  119*   < >  113*   < >  122*   CO2  20  17*  10*   < >  13*   < >  16*   < >  18*   < >  12*   BUN  70*  83*  88*   < >  84*   < >  91*   < >  107*   < >  48*   CR  4.14*  4.29*  4.75*   < >  4.36*   < >  3.97*   < >  4.26*   < >  1.93*   GLC  135*  142*  106*   < >  149*   < >  93   < >  140*   < >  174*   MALICK  6.2*  6.5*  6.7*   < >  6.9*   < >  7.8*   < >  7.6*   < >  7.2*   MAG   --    --    --    --   2.3   --   2.0   --    --    --   2.2   PHOS   --    --    --    --   7.1*   --   5.2*   --   6.0*   --    --     < > = values in this interval not displayed.       CBC -   Recent Labs   Lab Test  07/10/17   0701 07/09/17   0719  07/07/17   0616   WBC  6.7  7.5  7.9   HGB  8.2*  8.0*  7.8*   PLT  203  218  170       LFTs -   Recent Labs   Lab Test  07/07/17   0616  07/06/17   1601  06/21/17   0842  06/12/17   1250   ALKPHOS  197*  225*   --   177*   BILITOTAL  0.7  0.3   --   0.3   ALT  35  42   --   47   AST  18  22   --   42   PROTTOTAL  5.5*  6.0*   --   6.1*   ALBUMIN  2.7*  2.9*  2.9*  3.1*       Iron Panel -   Recent Labs   Lab Test  06/21/17   0842  06/14/17   1750  08/20/12   1146   IRON  36  26*  32*   IRONSAT  17  11*  11*   GWEN  196  87   --          Imaging:  All imaging studies reviewed by me.     Current Medications:    bumetanide  0.5 mg Intravenous Q12H     bacitracin   Topical BID     sodium bicarbonate  1,300 mg Oral TID     sodium bicarbonate (antacid)   Oral TID     calcium acetate  1,334 mg Oral TID w/meals     HYDROmorphone  0.1 mg Intravenous Once      pravastatin  40 mg Oral Daily     senna-docusate  1-2 tablet Oral BID     insulin aspart  1-6 Units Subcutaneous Q4H     metoprolol  25 mg Oral BID       - MEDICATION INSTRUCTIONS -       Warfarin Therapy Reminder       Toi Rosales MD

## 2017-07-11 VITALS
HEART RATE: 74 BPM | RESPIRATION RATE: 16 BRPM | DIASTOLIC BLOOD PRESSURE: 77 MMHG | WEIGHT: 163.5 LBS | SYSTOLIC BLOOD PRESSURE: 150 MMHG | BODY MASS INDEX: 28.06 KG/M2 | OXYGEN SATURATION: 93 % | TEMPERATURE: 98.2 F

## 2017-07-11 LAB
ALBUMIN SERPL-MCNC: 2.3 G/DL (ref 3.4–5)
ANION GAP SERPL CALCULATED.3IONS-SCNC: 10 MMOL/L (ref 3–14)
BUN SERPL-MCNC: 57 MG/DL (ref 7–30)
CA-I SERPL ISE-MCNC: 3.2 MG/DL (ref 4.4–5.2)
CALCIUM SERPL-MCNC: 6 MG/DL (ref 8.5–10.1)
CHLORIDE SERPL-SCNC: 112 MMOL/L (ref 94–109)
CO2 SERPL-SCNC: 25 MMOL/L (ref 20–32)
CREAT SERPL-MCNC: 3.9 MG/DL (ref 0.52–1.04)
GFR SERPL CREATININE-BSD FRML MDRD: 11 ML/MIN/1.7M2
GLUCOSE BLDC GLUCOMTR-MCNC: 137 MG/DL (ref 70–99)
GLUCOSE BLDC GLUCOMTR-MCNC: 192 MG/DL (ref 70–99)
GLUCOSE BLDC GLUCOMTR-MCNC: 196 MG/DL (ref 70–99)
GLUCOSE BLDC GLUCOMTR-MCNC: 206 MG/DL (ref 70–99)
GLUCOSE BLDC GLUCOMTR-MCNC: 232 MG/DL (ref 70–99)
GLUCOSE SERPL-MCNC: 132 MG/DL (ref 70–99)
INR PPP: 4.14 (ref 0.86–1.14)
MAGNESIUM SERPL-MCNC: 1.5 MG/DL (ref 1.6–2.3)
PHOSPHATE SERPL-MCNC: 4 MG/DL (ref 2.5–4.5)
POTASSIUM SERPL-SCNC: 3.2 MMOL/L (ref 3.4–5.3)
SODIUM SERPL-SCNC: 147 MMOL/L (ref 133–144)
TROPONIN I SERPL-MCNC: 0.02 UG/L (ref 0–0.04)
TROPONIN I SERPL-MCNC: 0.02 UG/L (ref 0–0.04)

## 2017-07-11 PROCEDURE — 00000146 ZZHCL STATISTIC GLUCOSE BY METER IP

## 2017-07-11 PROCEDURE — 25000132 ZZH RX MED GY IP 250 OP 250 PS 637: Mod: GY | Performed by: FAMILY MEDICINE

## 2017-07-11 PROCEDURE — 25800025 ZZH RX 258: Performed by: FAMILY MEDICINE

## 2017-07-11 PROCEDURE — S5010 5% DEXTROSE AND 0.45% SALINE: HCPCS | Performed by: FAMILY MEDICINE

## 2017-07-11 PROCEDURE — 85610 PROTHROMBIN TIME: CPT | Performed by: FAMILY MEDICINE

## 2017-07-11 PROCEDURE — 25000132 ZZH RX MED GY IP 250 OP 250 PS 637: Mod: GY | Performed by: INTERNAL MEDICINE

## 2017-07-11 PROCEDURE — A9270 NON-COVERED ITEM OR SERVICE: HCPCS | Mod: GY | Performed by: STUDENT IN AN ORGANIZED HEALTH CARE EDUCATION/TRAINING PROGRAM

## 2017-07-11 PROCEDURE — A9270 NON-COVERED ITEM OR SERVICE: HCPCS | Mod: GY | Performed by: FAMILY MEDICINE

## 2017-07-11 PROCEDURE — 25000131 ZZH RX MED GY IP 250 OP 636 PS 637: Mod: GY | Performed by: FAMILY MEDICINE

## 2017-07-11 PROCEDURE — 93005 ELECTROCARDIOGRAM TRACING: CPT

## 2017-07-11 PROCEDURE — 84484 ASSAY OF TROPONIN QUANT: CPT | Performed by: STUDENT IN AN ORGANIZED HEALTH CARE EDUCATION/TRAINING PROGRAM

## 2017-07-11 PROCEDURE — 84484 ASSAY OF TROPONIN QUANT: CPT | Performed by: FAMILY MEDICINE

## 2017-07-11 PROCEDURE — 83735 ASSAY OF MAGNESIUM: CPT | Performed by: FAMILY MEDICINE

## 2017-07-11 PROCEDURE — 25000132 ZZH RX MED GY IP 250 OP 250 PS 637: Mod: GY | Performed by: STUDENT IN AN ORGANIZED HEALTH CARE EDUCATION/TRAINING PROGRAM

## 2017-07-11 PROCEDURE — 36415 COLL VENOUS BLD VENIPUNCTURE: CPT | Performed by: FAMILY MEDICINE

## 2017-07-11 PROCEDURE — 82330 ASSAY OF CALCIUM: CPT | Performed by: STUDENT IN AN ORGANIZED HEALTH CARE EDUCATION/TRAINING PROGRAM

## 2017-07-11 PROCEDURE — 25000125 ZZHC RX 250: Performed by: FAMILY MEDICINE

## 2017-07-11 PROCEDURE — 93010 ELECTROCARDIOGRAM REPORT: CPT | Performed by: INTERNAL MEDICINE

## 2017-07-11 PROCEDURE — 80069 RENAL FUNCTION PANEL: CPT | Performed by: FAMILY MEDICINE

## 2017-07-11 PROCEDURE — A9270 NON-COVERED ITEM OR SERVICE: HCPCS | Mod: GY | Performed by: INTERNAL MEDICINE

## 2017-07-11 PROCEDURE — 83735 ASSAY OF MAGNESIUM: CPT | Performed by: STUDENT IN AN ORGANIZED HEALTH CARE EDUCATION/TRAINING PROGRAM

## 2017-07-11 PROCEDURE — 36415 COLL VENOUS BLD VENIPUNCTURE: CPT | Performed by: STUDENT IN AN ORGANIZED HEALTH CARE EDUCATION/TRAINING PROGRAM

## 2017-07-11 PROCEDURE — 25000128 H RX IP 250 OP 636: Performed by: FAMILY MEDICINE

## 2017-07-11 RX ORDER — POTASSIUM CHLORIDE 750 MG/1
20-40 TABLET, EXTENDED RELEASE ORAL
Status: DISCONTINUED | OUTPATIENT
Start: 2017-07-11 | End: 2017-07-11

## 2017-07-11 RX ORDER — MAGNESIUM SULFATE HEPTAHYDRATE 40 MG/ML
4 INJECTION, SOLUTION INTRAVENOUS EVERY 4 HOURS PRN
Status: DISCONTINUED | OUTPATIENT
Start: 2017-07-11 | End: 2017-07-11 | Stop reason: HOSPADM

## 2017-07-11 RX ORDER — FUROSEMIDE 40 MG
40 TABLET ORAL DAILY
Qty: 60 TABLET | Refills: 0 | Status: SHIPPED | OUTPATIENT
Start: 2017-07-11 | End: 2017-07-11

## 2017-07-11 RX ORDER — POTASSIUM CHLORIDE 750 MG/1
40 TABLET, EXTENDED RELEASE ORAL ONCE
Status: COMPLETED | OUTPATIENT
Start: 2017-07-11 | End: 2017-07-11

## 2017-07-11 RX ORDER — FUROSEMIDE 40 MG
40 TABLET ORAL DAILY
Qty: 60 TABLET | Refills: 0 | Status: ON HOLD | OUTPATIENT
Start: 2017-07-11 | End: 2017-08-18

## 2017-07-11 RX ADMIN — INSULIN ASPART 2 UNITS: 100 INJECTION, SOLUTION INTRAVENOUS; SUBCUTANEOUS at 12:24

## 2017-07-11 RX ADMIN — BACITRACIN: 500 OINTMENT TOPICAL at 08:36

## 2017-07-11 RX ADMIN — CALCIUM ACETATE 1334 MG: 667 CAPSULE ORAL at 17:46

## 2017-07-11 RX ADMIN — POTASSIUM CHLORIDE 40 MEQ: 750 TABLET, EXTENDED RELEASE ORAL at 09:45

## 2017-07-11 RX ADMIN — SODIUM BICARBONATE 650 MG TABLET 1300 MG: at 08:33

## 2017-07-11 RX ADMIN — METOPROLOL TARTRATE 50 MG: 50 TABLET ORAL at 08:35

## 2017-07-11 RX ADMIN — INSULIN ASPART 2 UNITS: 100 INJECTION, SOLUTION INTRAVENOUS; SUBCUTANEOUS at 16:20

## 2017-07-11 RX ADMIN — ISOSORBIDE MONONITRATE 30 MG: 30 TABLET, EXTENDED RELEASE ORAL at 08:34

## 2017-07-11 RX ADMIN — PRAVASTATIN SODIUM 40 MG: 40 TABLET ORAL at 08:34

## 2017-07-11 RX ADMIN — CALCIUM ACETATE 1334 MG: 667 CAPSULE ORAL at 12:23

## 2017-07-11 RX ADMIN — INSULIN ASPART 2 UNITS: 100 INJECTION, SOLUTION INTRAVENOUS; SUBCUTANEOUS at 00:15

## 2017-07-11 RX ADMIN — DEXTROSE AND SODIUM CHLORIDE 500 ML: 5; 450 INJECTION, SOLUTION INTRAVENOUS at 11:19

## 2017-07-11 RX ADMIN — MAGNESIUM SULFATE IN WATER 4 G: 40 INJECTION, SOLUTION INTRAVENOUS at 14:07

## 2017-07-11 RX ADMIN — FUROSEMIDE 40 MG: 40 TABLET ORAL at 08:34

## 2017-07-11 RX ADMIN — CALCIUM ACETATE 1334 MG: 667 CAPSULE ORAL at 08:31

## 2017-07-11 RX ADMIN — ONDANSETRON 4 MG: 4 TABLET, ORALLY DISINTEGRATING ORAL at 07:51

## 2017-07-11 RX ADMIN — SODIUM BICARBONATE 650 MG TABLET 1300 MG: at 14:06

## 2017-07-11 NOTE — PROGRESS NOTES
"CLINICAL NUTRITION SERVICES - BRIEF NOTE     Received consult to review renal diet/heart healthy diet education. Pt with questions.     Interventions:  Nutrition Education   1. Answered pt's questions regarding potassium and phosphorus intake. Reviewed previously received handouts (see RD note 7/10) and resources available for kidney friendly recipes.   2. Provided \"Potassium Content of Foods\" list per pt request.     Patient to ask any further nutrition-related questions before discharge. In addition, pt may request outpatient RD appointment.     Sonya Bassett  Registration Eligible   Pager: [phone]    I have read and agree with the above nutrition note, recs, and interventions  Ayleen Dinero, RD, LD       "

## 2017-07-11 NOTE — PLAN OF CARE
Problem: Goal Outcome Summary  Goal: Goal Outcome Summary  Outcome: No Change  Vital signs stable. Patient answers orientation questions appropriately but at times seems forgetful. Up with standby assistance and walker; ambulated in halls once at 0910; became lightheaded; returned to room. MD notified; 500mL D5 1/2NS bolus infused, trop ordered (level 0.022), Mag 1.5 (Mag 4g infusing), Phos 4.0, ionized Ca 3.2. No complaints of pain or nausea. INR 4.14 - no dose warfarin today. Tolerating renal diet. BG checks 132, 206. PIV intact SL. Voiding spontaneously, having loose stools. BLE red and edematous. Showered with minimal assistance, did not become lightheaded with activity at 1300. Will continue to monitor.

## 2017-07-11 NOTE — DISCHARGE SUMMARY
Attestation:  This patient has been seen and evaluated by me, Adryan Damian on 7/11/2017.  I saw and discussed the case with the primary resident and the care team. I agree with the findings and plan in this note. I have reviewed today's vital signs, medications, laboratory results.    Adryan Damian MD  [facility]  Discharge Summary    Kathryn Banks MRN# [id number]   Age: 77 year old YOB: 1940     Date of Admission:  7/6/2017  Date of Discharge:  7/11/2017  Admitting Physician:  Juan Lopez MD  Discharge Physician:  Adryan Damian MD  Contact: [phone]  Discharging Service:  [facility]     Primary Provider:   [facility]heeraj Jj  [facility] [address]  / [address] [address]*  [phone]          Discharge Disposition:   Discharged to home           Condition on Discharge:   Discharge condition: Stable   Code status on discharge: Full Code          Admission Diagnoses:   renal failure  CHF (congestive heart failure) (H), preserved ejection fraction           Principle Discharge Problem:   Acute kidney injury (nontraumatic) (H)   renal failure  CHF (congestive heart failure) (H), preserved ejection fraction            Additional Discharge Problems:     Patient Active Problem List    Diagnosis Date Noted     CHF (congestive heart failure) (H) 07/06/2017     Priority: Medium     Microscopic hematuria 06/21/2017     Priority: Medium     Acute kidney injury (nontraumatic) (H) 06/21/2017     Priority: Medium     Blood in stool 06/14/2017     Priority: Medium     Acute on chronic renal failure (H) 06/12/2017     Priority: Medium     Memory loss 06/12/2017     Priority: Medium     Chronic atrial fibrillation (H) on 6/12-6/13 06/12/2017     Priority: Medium     Proteinuria 2.1 g/g Cr 06/02/2017     Priority: Medium     Bradycardia 06/02/2017     Priority: Medium      Generalized edema - anasarca 06/01/2017     Priority: Medium     Hypertensive heart and chronic kidney disease with heart failure and stage 1 through stage 4 chronic kidney disease, or unspecified chronic kidney disease (H) 06/01/2017     Priority: Medium     Supratherapeutic INR 06/01/2017     Priority: Medium     Chronic heart failure (H) with preserved EF 05/31/2017     Priority: Medium     Atrial flutter (H) - present 6/12 05/31/2017     Priority: Medium     Pulmonary hypertension (H) 05/24/2017     Priority: Medium     Metabolic acidosis, normal anion gap (NAG) 04/30/2017     Priority: Medium     Rash - redness medial thighs 04/29/2017     Priority: Medium     Atrial fibrillation with rapid ventricular response (H) 04/28/2017     Priority: Medium     Essential hypertension with goal blood pressure less than 140/90 09/13/2016     Priority: Medium     Acute renal failure with tubular necrosis (H) 07/02/2016     Priority: Medium     Long-term (current) use of anticoagulants [Z79.01] 03/04/2016     Priority: Medium     Type 2 diabetes mellitus with other circulatory complications (H) 01/19/2016     Priority: Medium     Non morbid obesity, unspecified obesity type 11/01/2015     Priority: Medium     Osteoarthritis of hip 04/29/2015     Priority: Medium     Lymphedema 11/10/2014     Priority: Medium     Anemia of chronic renal failure, stage 4 (severe) (H) 11/10/2014     Priority: Medium     Hyperlipidemia LDL goal <100 10/31/2010     Priority: Medium     Edema 01/31/2008     Priority: Medium     Advanced directives, counseling/discussion 11/12/2012     Priority: Low     Advance Directive received and scanned. Click on Code in the patient header to view. 11/12/2012          History of peptic ulcer disease 10/17/2007     Priority: Low     Problem list name updated by automated process. Provider to review       Postsurgical aortocoronary bypass status 01/27/2007     Priority: Low     Restless leg syndrome       Priority: Low     Renal failure 03/23/2015     Health Care Home 01/20/2014     State Tier Level:  Tier 3  Status:  Closed  Care Coordinator:  Pinky Oden if needed in the future.     See Letters for [facility] Care Plan         CAD - NSTEMI, CABG x 5 2007, angio in 2013 with patent grafts other than occluded graft to PL 02/04/2013     Hx of non-ST elevation myocardial infarction (NSTEMI) 02/04/2013     Kidney stone 12/29/2009     Iron deficiency anemia 10/17/2007     Problem list name updated by automated process. Provider to review       ESOPHAGEAL REFLUX 03/28/2006     Lipoma of other skin and subcutaneous tissue 09/07/2004     Sleep apnea      Problem list name updated by automated process. Provider to review              Medications Prior to Admission:       No current facility-administered medications on file prior to encounter.   Current Outpatient Prescriptions on File Prior to Encounter:  pravastatin (PRAVACHOL) 40 MG tablet Take 1 tablet (40 mg) by mouth daily   sodium bicarbonate 650 MG tablet Take 1 tablet (650 mg) by mouth 3 times daily   acetaminophen (TYLENOL) 325 MG tablet Take 2 tablets (650 mg) by mouth every 4 hours as needed for mild pain   nitroglycerin (NITROSTAT) 0.4 MG sublingual tablet Place 1 tablet (0.4 mg) under the tongue every 5 minutes as needed for chest pain   gabapentin (NEURONTIN) 300 MG capsule Take 1 capsule (300 mg) by mouth At Bedtime (Patient taking differently: Take 300 mg by mouth 2 times daily )   pramipexole (MIRAPEX) 0.5 MG tablet Take 3 tablets (1.5 mg) by mouth every evening   metoprolol (LOPRESSOR) 25 MG tablet Take 2 tablets (50 mg) by mouth 2 times daily   ferrous sulfate (IRON) 325 (65 FE) MG tablet Take 1 tablet (325 mg) by mouth daily (with breakfast)   calcium carbonate-vitamin D 500-400 MG-UNIT TABS per tablet Take 1 tablet by mouth daily   cyanocobalamin (VITAMIN B12) 1000 MCG/ML injection Inject 1 mL (1,000 mcg) into the muscle every 30 days   ORDER FOR DME Equipment  being ordered: cpap machine, mask, humidifier, tubing and filters            Discharge Medications:        Review of your medicines      START taking       Dose / Directions    calcium acetate 667 MG Caps capsule   Commonly known as:  PHOSLO   Used for:  Stage 4 chronic kidney disease (H)        Dose:  1334 mg   Take 2 capsules (1,334 mg) by mouth 3 times daily (with meals)   Quantity:  180 capsule   Refills:  0       furosemide 40 MG tablet   Commonly known as:  LASIX   Used for:  Chronic diastolic heart failure (H)        Dose:  40 mg   Take 1 tablet (40 mg) by mouth daily   Quantity:  60 tablet   Refills:  0         CONTINUE these medicines which may have CHANGED, or have new prescriptions. If we are uncertain of the size of tablets/capsules you have at home, strength may be listed as something that might have changed.       Dose / Directions    gabapentin 300 MG capsule   Commonly known as:  NEURONTIN   This may have changed:  when to take this   Used for:  Diabetic polyneuropathy associated with type 2 diabetes mellitus (H)        Dose:  300 mg   Take 1 capsule (300 mg) by mouth At Bedtime   Refills:  0         CONTINUE these medicines which have NOT CHANGED       Dose / Directions    acetaminophen 325 MG tablet   Commonly known as:  TYLENOL   Used for:  Acute on chronic renal failure (H)        Dose:  650 mg   Take 2 tablets (650 mg) by mouth every 4 hours as needed for mild pain   Quantity:  100 tablet   Refills:  0       calcium carbonate-vitamin D 500-400 MG-UNIT Tabs per tablet   Used for:  Closed intertrochanteric fracture of left hip, initial encounter        Dose:  1 tablet   Take 1 tablet by mouth daily   Refills:  0       cyanocobalamin 1000 MCG/ML injection   Commonly known as:  VITAMIN B12   Used for:  Vitamin B12 deficiency (non anemic)        Dose:  1 mL   Inject 1 mL (1,000 mcg) into the muscle every 30 days   Quantity:  1 mL   Refills:  11       ferrous sulfate 325 (65 FE) MG tablet   Commonly  known as:  IRON   Used for:  Iron deficiency anemia secondary to inadequate dietary iron intake        Dose:  325 mg   Take 1 tablet (325 mg) by mouth daily (with breakfast)   Quantity:  100 tablet   Refills:  0       metoprolol 25 MG tablet   Commonly known as:  LOPRESSOR   Used for:  Atrial fibrillation with rapid ventricular response (H)        Dose:  50 mg   Take 2 tablets (50 mg) by mouth 2 times daily   Quantity:  60 tablet   Refills:  0       nitroGLYcerin 0.4 MG sublingual tablet   Commonly known as:  NITROSTAT   Used for:  Hx of non-ST elevation myocardial infarction (NSTEMI), Atherosclerosis of native coronary artery of native heart without angina pectoris, Postsurgical aortocoronary bypass status        Dose:  0.4 mg   Place 1 tablet (0.4 mg) under the tongue every 5 minutes as needed for chest pain   Quantity:  25 tablet   Refills:  0       order for DME   Used for:  ANABEL (obstructive sleep apnea)        Equipment being ordered: cpap machine, mask, humidifier, tubing and filters   Quantity:  1 Device   Refills:  0       pramipexole 0.5 MG tablet   Commonly known as:  MIRAPEX   Used for:  Restless legs syndrome (RLS)        Dose:  1.5 mg   Take 3 tablets (1.5 mg) by mouth every evening   Refills:  0       pravastatin 40 MG tablet   Commonly known as:  PRAVACHOL   Used for:  Hx of non-ST elevation myocardial infarction (NSTEMI), Atherosclerosis of native coronary artery of native heart without angina pectoris, Type 2 diabetes mellitus with other circulatory complications (H)        Dose:  40 mg   Take 1 tablet (40 mg) by mouth daily   Quantity:  90 tablet   Refills:  3       sodium bicarbonate 650 MG tablet   Used for:  Stage 4 chronic kidney disease (H)        Dose:  650 mg   Take 1 tablet (650 mg) by mouth 3 times daily   Quantity:  90 tablet   Refills:  0         STOP taking          isosorbide mononitrate 30 MG 24 hr tablet   Commonly known as:  IMDUR           warfarin 2.5 MG tablet   Commonly known  as:  COUMADIN                Where to get your medicines      Some of these will need a paper prescription and others can be bought over the counter. Ask your nurse if you have questions.     Bring a paper prescription for each of these medications      calcium acetate 667 MG Caps capsule     furosemide 40 MG tablet                  Discharge Instructions and Follow-Up:   Discharge diet: Renal diet   Discharge activity: Activity as tolerated   Discharge follow-up: Follow up with primary care provider in 2 days   Lines and drains: None    Wound care: None          Brief Admission History and Evaluation:   Kathryn Banks is a 77 year old female with history of CAD s/p CABG in 2007, A flutter s/p ablatin (6/7/2017), recurrent admissions due to HFpEF (60-65% echo 6/1/2017), transferred from [facility] ED for management of worsening kidney function with non anion gap metabolic acidosis due to aggressive diuresis in setting of fluid overload.          Hospital Course/Discharge Plan by Problem:   MARTÍN on CKD- baseline Cr about 1.2-1.4 in Jan 2017, GFR ~35-40  Non-anion gap metabolic acidosis-resolved  Kathryn was seen by Nephrology on admission to determine if hemodialysis was indicated. Per nephro, diuresis was to be attempted first and if kidney function continued to worsen, would then pursue dialysis. Kathryn was started on 1mg IV bumex BID with adequate response in urine output, weight, volume status, and kidney function. Metabolic acidosis resolved with both sodium bicarbonate and a regimen of baking soda in water. Weight has decreased from 84.1kg (7/7) to 74.2kg (7/11). Dialysis was not required. IV bumex was discontinued and Kathryn was transitioned back to her PTA Furosemide. Kathryn was also seen by nutrition for education on both a renal and cardiac protective diet.      Discharge Plan:  -follow up with PCP in 2 days for hospital follow up and labs. Will need to have a BMP repeated at this visit with results  sent to Dr. Rivera's [facility] of nephrology. Nephrology will also arrange an appointment within 1 week of discharge.   -Furosemide 40mg daily. Check weight daily, if it has increased >2lbs, take an extra dose of 40mg furosemide  -continue calcium acetate  -continue sodium bicarbonate     Hypernatremia-improving  Na was increasing while inpatient in setting of diuresis which then improved with decrease in bumex dose and encouraging PO free water intake.     Discharge plan: recheck BMP at follow up in 2 days      Hyperphosphatemia  Hypocalcemia  Phos elevated in setting of kidney failure. Both corrected and ionized calcium low.    Discharge Plan:  -continue calcium acetate 1334mg TID  -recheck Phos and ionized calcium at follow up in 2 days      Hyperkalemia: resolved  In setting of kidney injury as above. No T wave abnormalities on EKG. Now resolved with diuresis    Discharge Plan:  -recheck BMP at follow up in 2 days      Supratherapeutic INR-goal 2-3  On coumadin for Aflutter. INR remains supratherapeutic. S/P 2.5mg PO Vit K x 1. No signs of active bleeding while admitted. INR on day of discharge: 4.14    Discharge plan:   - continue to hold coumadin until follow up with PCP in 2 days. Recheck INR at that visit. Coumadin dosing per PCP      HFpEF, 60-65% EF as per echo on 06/01/17-stable  Adequate response in urine output with diuresis. Weight has been decreasing. No acute resp distress, no complaints of chest pain.     Discharge plan:  - Furosemide 40mg daily as above with daily weight checks  - continue metoprolol 50mg BID  - hold imdur in setting of light-headedness with standing and ambulation. Has home blood pressure monitor and instructed to check and record BP daily. To notify PCP if SBP >180mmHg. PCP to determine if restarting imdur is appropriate     Left leg wound  Lower extremity edema  Improving with local wound cares. WOC and lymphedema consults were not required. Edema improving with  diuresis    Discharge plan: diuresis as above with furosemide 40mg daily      AMS - resolved - likely due to metabolic derangement secondary to uremia or from medications (opioids prior to admission)  Patient somnolent on admission, likely multifactorial- metabolic (MARTÍN, increased BUN, electrolyte abnormalities) vs medications (received large dose of fentanyl at OSH). Remained alert and oriented during remainder of hospital stay.       DM-2, last A1C 7.3 on 06/22/17  No medications at home. Nutrition was consulted for diet education      Anemia of chronic disease, stable  Iron studies concerning for possible iron deficiency component.   - Continue PTA ferous sulfate.     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  - continue metoprolol 50mg BID   - On coumadin for anticoagulation, to be held in setting of supratherapeutic INR until follow up with PCP as above      Hypertension  -Hold PTA imdur as above  -metoprolol 50mg BID as above         Final Day of Progress before Discharge:         Interval History:  General:  feels better and appears to be improving   Vitals: vitals signs have been stable   Intake/Output: stable intake and output   Nutrition: Renal diet   Mental status: improved cognition and overall mental status   Respiratory: stable   Cardiovascular no chest pain, no palpitations, blood pressure stable and heart rate stable   Renal: improved renal function and urinary output   Neurologic: No changes   Medications: No new medications on discharge    Interventions: No interventions   Consults: Consultation received from nephrology           Physical Exam:  Vitals were reviewed  Temp: 98.2  F (36.8  C) Temp src: Oral BP: 150/77 Pulse: 74 Heart Rate: 74 Resp: 16 SpO2: 93 % O2 Device: None (Room air)    Constitutional:   awake, alert, cooperative, no apparent distress, and appears stated age     Lungs:   no increased work of breathing and clear to auscultation, no crackles or wheezing     Cardiovascular:   normal S1 and S2  and no murmur noted, appears euvolemic on exam      Abdomen:   soft, non-distended and non-tender     Musculoskeletal:   full range of motion noted          Data:  All laboratory data reviewed  All cardiac studies reviewed by me.  All imaging studies reviewed by me.         Pending Results:   None      It was a pleasure to participate in the care of Kathryn Banks.  If you have questions about her care, please do not hesitate to contact the [facility] team via the hospital [facility] on which we are stationed.      [facility]  [facility] [address] - [phone]

## 2017-07-11 NOTE — PLAN OF CARE
Problem: Individualization  Goal: Patient Preferences  Outcome: No Change  Notified Cross cover of orthostatic BP due to 20 pt. Change per written orders. BP lying was 116/89 to 147/74 standing and she had a significant amt. Of nausea in which Zofran was given. IV. She stated fine and to continue to observe. Was up to the bathrm with assist. Of 1 and her walker.  Will continue to monitor and report any significant changes.

## 2017-07-11 NOTE — PLAN OF CARE
Problem: Goal Outcome Summary  Goal: Goal Outcome Summary  Outcome: Improving  Afebrile.  Continues hypertensive.  Alert and oriented x 4. Got metoprolol dose this evening.  Ordered for IMdur now in AM.  Up in chair 1st part of shift.  Ordered and ate fair for supper.  C/o some nausea.  IV Zofran effective.  Cee catheter dc'd tonight.  Able to void x 1.  Up to BR with 1 assist and walker.  Resting comfortable at this time.  Continue to monitor closely, support.  Per patient to possibly discharge in near future.

## 2017-07-11 NOTE — PHARMACY-ANTICOAGULATION SERVICE
Warfarin Therapy Hold Note  This patient is currently receiving warfarin for Atrial fibrillation.    Goal INR:  2-3.      Anticoagulation Dose History     Recent Dosing and Labs Latest Ref Rng & Units 7/6/2017 7/6/2017 7/7/2017 7/8/2017 7/9/2017 7/10/2017 7/11/2017    INR 0.86 - 1.14 7.4 6.41(HH) 6.00(HH) 5.97(HH) 5.91(HH) 4.61(H) 4.14(H)    INR 0.86 - 1.14 - - - - - - -    INR Point of Care 0.86 - 1.14 - - - - - - -        Bleeding Signs/Symptoms:  None    Assessment:  Current INR is supratherapeutic.  This is most likely due to: stress from acute illness (heart failure).     Plan:  1. HOLD today s warfarin dose.   An order has been placed in EPIC for  Warfarin- No Dose Today    2. Do not recommend reversal with vitamin K or FFP at this time.  (PO vitamin K 2.5mg given 7/7)  3. Recheck next INR tomorrow with AM labs     Ramila Travis, Pharm.D., BCPS  Pager [phone]

## 2017-07-11 NOTE — PROGRESS NOTES
Social Work    D:  SW notified by bedside RN that pt had questions related to kidney transplant.    A/I: Met with pt to introduce self, SW role and offer support.  Pt stated that she was admitted with worsening kidney function and was wondering in general what her insurance coverage would be for transplant and/or dialysis.  SW explained that this is a difficult question to answer at this time, explained general (estimated cost) of transplant medication co-pays with the type of the insurance that pt has now, etc.  Pt stated that her  had a heart transplant 15 yrs ago at [facility] so she is somewhat familiar.  Pt is hoping that her kidney function will continue to improve.    Gave pt positive feedback for her willingness to ask questions and think ahead about different options.    P: Pt plans to be d/c'd home today and she will follow closely with her Nephrologist, she anticipates that her  will be able to provide her with transportation home.      SHANTELL Knott, [facility]    Ph: [phone]  Pager: [phone]

## 2017-07-11 NOTE — DISCHARGE INSTRUCTIONS
Call made to your PCP clinic to see if they can work you into the schedule for this Friday.  You will need a repeat INR on Friday as well as follow up with PCP by the end of the week.  If they do not call you tuesday afternoon please contact clinic to ask to be scheduled by the end of the week.      Nephrology clinic will contact you to reschedule your appointment and fit you into see your nephrologist next week.  You will need labs prior to the appointment.  We cancelled your appointments for 7/11/2017 with Vibha Barfield MD    The following labs are recommended on follow up with PCP: INR, BMP, Mg, Phos, ionized calcium    Hold your warfarin until you see your PCP. An INR will be checked at that time and your PCP will then manage your warfarin dose.

## 2017-07-11 NOTE — TELEPHONE ENCOUNTER
ED/ OUTREACH PROTOCOL    Patient Contact:  Follow up encounter documentation:    Reason for follow up: Kathryn [place] appeared on our list for recent discharge from an Emergency Room, inpatient admission, or TCU/nursing home facility.    Attempt # 2      Was call answered? No   Phone line is busy.   Jing Vu RN

## 2017-07-11 NOTE — PROGRESS NOTES
Clinic Care Coordination Contact    Situation: Patient chart reviewed by care coordinator.    Background: received a CTS pt was discharging yesterday.     Assessment: Pt is still inpt status     Plan/Recommendations: will outreach upon discharge.     Meenakshi Larsen RN, BSN  Care Coordination    [address] [address]  Office: [phone]  Fax [phone]   [email or web address]   [email or web address]     Connect with [facility] on social media.

## 2017-07-11 NOTE — PLAN OF CARE
"Problem: Individualization  Goal: Patient Preferences  Outcome: Improving  While doing hourly checks Kathryn was awake and reading a magazine and stated she felt much better. When asked had this happened to her before, becoming dizzy and nauseated when standing she stated \"no\". Shortly after her bout of nausea she was standby assist to the bathroom as well as used her walker. Will continue to monitor and report any significant changes.      "

## 2017-07-12 ENCOUNTER — TELEPHONE (OUTPATIENT)
Dept: FAMILY MEDICINE | Facility: CLINIC | Age: 77
End: 2017-07-12

## 2017-07-12 ENCOUNTER — CARE COORDINATION (OUTPATIENT)
Dept: CARE COORDINATION | Facility: CLINIC | Age: 77
End: 2017-07-12

## 2017-07-12 ENCOUNTER — TELEPHONE (OUTPATIENT)
Dept: INTERNAL MEDICINE | Facility: CLINIC | Age: 77
End: 2017-07-12

## 2017-07-12 DIAGNOSIS — E11.59 TYPE 2 DIABETES MELLITUS WITH OTHER CIRCULATORY COMPLICATIONS (H): Primary | ICD-10-CM

## 2017-07-12 LAB — INTERPRETATION ECG - MUSE: NORMAL

## 2017-07-12 NOTE — TELEPHONE ENCOUNTER
Georgina-from [facility] Home Monitoring notified of MD message.  No questions or concerns.  Grant Irving MA

## 2017-07-12 NOTE — TELEPHONE ENCOUNTER
Patient is scheduled on Friday, 7/14/17, that was made today at 11:30am, patient is aware of this appointment.  Patient has a CTS handoff, so Care Coordination is reaching out to her in a different encounter.  Closing this encounter.  Arlet Lee RN

## 2017-07-12 NOTE — TELEPHONE ENCOUNTER
See cc encounter.     Meenakshi Larsen RN, BSN  Care Coordination    [address] [address]  Office: [phone]  Fax [phone]   [email or web address]   [email or web address]     Connect with [facility] on social media.

## 2017-07-12 NOTE — PROGRESS NOTES
Patient was seen at [facility] from 7/6/17 through 7/11/17          [facility]  Discharge Summary     Kathryn Banks MRN# [id number]   Age: 77 year old YOB: 1940      Date of Admission:                                      7/6/2017  Date of Discharge:                                      7/11/2017  Admitting Physician:                                   Juan Lopez MD  Discharge Physician:                                  Adryan Damian MD  Contact: [phone]  Discharging Service:                                   [facility]

## 2017-07-12 NOTE — PLAN OF CARE
Problem: Goal Outcome Summary  Goal: Goal Outcome Summary  Outcome: Adequate for Discharge Date Met:  07/11/17  VSS.  Troponin rechecked per order and OK per MD for patient to discharge to home.  Discharge orders written and reviewed with patient.  Has all needed scripts.  Left floor via transport at 1900.  To stop and  medications at pharmacy.   to  in lobby.  To f/u as outpatient..

## 2017-07-12 NOTE — TELEPHONE ENCOUNTER
Patient called to schedule appointment for a hospital follow-up    Patient was admitted to [facility]  Discharged date: 7/11/2017  Reason for hospital admission:  Kidney disease  Does patient have future appointment scheduled with provider? Yes Dr. Jj  Date of future appointment:  Friday July 14th      This information will be used to help the care team plan for the patients upcoming visit.  The triage RN may determine that a follow up call is necessary and reach out to the patient via the phone number listed in the chart.     Please route this message on routine priority to the Triage RN pool.     Thank you,  Melly Lafleur   for [facility]

## 2017-07-12 NOTE — TELEPHONE ENCOUNTER
Reason for Call:  Other call back    Detailed comments: Patient requested in home INR monitoring, but she is transitional care as of now.  Should they re-order in home monitoring or keep her on inactive status?    Phone Number Patient can be reached at: Other phone number:  N/A  [phone] Dahlia Ocampo Time: Any    Can we leave a detailed message on this number? YES    Call taken on 7/12/2017 at 11:14 AM by Ambar Timmons

## 2017-07-12 NOTE — NURSING NOTE
A user error has taken place: orders placed in error, not carried out on this patient.      Zoltan Campos Medical Assistant

## 2017-07-13 NOTE — PROGRESS NOTES
Clinic Care Coordination Contact  [facility]/Voicemail    Referral Source: CTS  Clinical Data: Care Coordinator Outreach  Outreach attempted x 1.  No answer no machine    Contacted [facility] and they will be going out tomorrow to see her and get her started on tele monitoring and doing INRs.     Plan.   RN will try again to outreach to pt in one business day.     Meenakshi Larsen RN, B[place] [address]  Office: [phone]  Fax [phone]   [email or web address]   [email or web address]     Connect with [facility] on social media.

## 2017-07-14 ENCOUNTER — TELEPHONE (OUTPATIENT)
Dept: INTERNAL MEDICINE | Facility: CLINIC | Age: 77
End: 2017-07-14

## 2017-07-14 ENCOUNTER — OFFICE VISIT (OUTPATIENT)
Dept: INTERNAL MEDICINE | Facility: CLINIC | Age: 77
End: 2017-07-14
Payer: COMMERCIAL

## 2017-07-14 ENCOUNTER — ANTICOAGULATION THERAPY VISIT (OUTPATIENT)
Dept: ANTICOAGULATION | Facility: CLINIC | Age: 77
End: 2017-07-14
Payer: COMMERCIAL

## 2017-07-14 ENCOUNTER — TELEPHONE (OUTPATIENT)
Dept: FAMILY MEDICINE | Facility: CLINIC | Age: 77
End: 2017-07-14

## 2017-07-14 VITALS
OXYGEN SATURATION: 96 % | WEIGHT: 178 LBS | HEART RATE: 98 BPM | RESPIRATION RATE: 16 BRPM | TEMPERATURE: 98 F | SYSTOLIC BLOOD PRESSURE: 154 MMHG | BODY MASS INDEX: 30.55 KG/M2 | DIASTOLIC BLOOD PRESSURE: 80 MMHG

## 2017-07-14 DIAGNOSIS — Z79.01 LONG-TERM (CURRENT) USE OF ANTICOAGULANTS: ICD-10-CM

## 2017-07-14 DIAGNOSIS — I48.20 CHRONIC ATRIAL FIBRILLATION (H): ICD-10-CM

## 2017-07-14 DIAGNOSIS — I13.0 HYPERTENSIVE HEART AND CHRONIC KIDNEY DISEASE WITH HEART FAILURE AND STAGE 1 THROUGH STAGE 4 CHRONIC KIDNEY DISEASE, OR UNSPECIFIED CHRONIC KIDNEY DISEASE (H): Primary | ICD-10-CM

## 2017-07-14 DIAGNOSIS — I10 ESSENTIAL HYPERTENSION WITH GOAL BLOOD PRESSURE LESS THAN 140/90: ICD-10-CM

## 2017-07-14 LAB
ANION GAP SERPL CALCULATED.3IONS-SCNC: 10 MMOL/L (ref 3–14)
BUN SERPL-MCNC: 48 MG/DL (ref 7–30)
CALCIUM SERPL-MCNC: 6.7 MG/DL (ref 8.5–10.1)
CHLORIDE SERPL-SCNC: 110 MMOL/L (ref 94–109)
CO2 SERPL-SCNC: 25 MMOL/L (ref 20–32)
CREAT SERPL-MCNC: 3.28 MG/DL (ref 0.52–1.04)
GFR SERPL CREATININE-BSD FRML MDRD: 14 ML/MIN/1.7M2
GLUCOSE SERPL-MCNC: 274 MG/DL (ref 70–99)
INR PPP: 1.6
POTASSIUM SERPL-SCNC: 4.6 MMOL/L (ref 3.4–5.3)
SODIUM SERPL-SCNC: 145 MMOL/L (ref 133–144)

## 2017-07-14 PROCEDURE — 99495 TRANSJ CARE MGMT MOD F2F 14D: CPT | Performed by: INTERNAL MEDICINE

## 2017-07-14 PROCEDURE — 80048 BASIC METABOLIC PNL TOTAL CA: CPT | Performed by: INTERNAL MEDICINE

## 2017-07-14 PROCEDURE — 99207 ZZC NO CHARGE NURSE ONLY: CPT | Performed by: INTERNAL MEDICINE

## 2017-07-14 PROCEDURE — 36415 COLL VENOUS BLD VENIPUNCTURE: CPT | Performed by: INTERNAL MEDICINE

## 2017-07-14 ASSESSMENT — PAIN SCALES - GENERAL: PAINLEVEL: NO PAIN (0)

## 2017-07-14 NOTE — PROGRESS NOTES
SUBJECTIVE:                                                    Kathryn Banks is a 77 year old female who presents to clinic today for the following health issues:      Hospital Follow-up Visit:    Hospital/Nursing Home/IP Rehab Facility: [facility]  Date of Admission: 7/6/17  Date of Discharge: 7/11/17  Reason(s) for Admission: renal failure and chf            Problems taking medications regularly:  None       Medication changes since discharge: None       Problems adhering to non-medication therapy:  None    Summary of hospitalization:  [facility] discharge summary reviewed  Diagnostic Tests/Treatments reviewed.  Follow up needed: none  Other Healthcare Providers Involved in Patient s Care:         Homecare  Update since discharge: improved.     Post Discharge Medication Reconciliation: discharge medications reconciled and changed, per note/orders (see AVS).  Plan of care communicated with patient and family      Patient was eating [employer] as I came into the room says that isn't normal for her.    She has been home the last 3 days, feeling ok, no sob.  Weights at home have been stable she says, going to get a telemetry scale.   and granddaughter remind her to take her medications.  Taking lasix 40mg once a day.  Doesn't seem to be much urination.  Metoprolol is 50mg bid.  Taking sodium bicarb in pill form for acidosis.      INR was high at 4.1 so coumadin was held, now restarted.      Homecare is seeing her.     Past Medical History:   Diagnosis Date     Carpal tunnel syndrome      Coronary atherosclerosis of native coronary artery 2006    5 vessel CABG     OBSTRUCTIVE SLEEP APNEA     using CPAP     Osteoarthrosis, unspecified whether generalized or localized, unspecified site     generalized arthritis, particularly in her hands and feet         Other and combined forms of senile cataract 2000    Bilateral     Other and unspecified hyperlipidemia      RESTLESS LEGS  SYNDROME      TENOSYNOV HAND/WRIST NEC 6/30/2006     Type II or unspecified type diabetes mellitus without mention of complication, not stated as uncontrolled 2001    Diabetes mellitus/pt is diet controlled with weight loss     Typical atrial flutter (H) 01/17/2007     Unspecified essential hypertension      Current Outpatient Prescriptions   Medication     calcium acetate (PHOSLO) 667 MG CAPS capsule     furosemide (LASIX) 40 MG tablet     pravastatin (PRAVACHOL) 40 MG tablet     sodium bicarbonate 650 MG tablet     acetaminophen (TYLENOL) 325 MG tablet     gabapentin (NEURONTIN) 300 MG capsule     pramipexole (MIRAPEX) 0.5 MG tablet     metoprolol (LOPRESSOR) 25 MG tablet     ferrous sulfate (IRON) 325 (65 FE) MG tablet     calcium carbonate-vitamin D 500-400 MG-UNIT TABS per tablet     cyanocobalamin (VITAMIN B12) 1000 MCG/ML injection     nitroglycerin (NITROSTAT) 0.4 MG sublingual tablet     ORDER FOR DME     No current facility-administered medications for this visit.      Social History   Substance Use Topics     Smoking status: Former Smoker     Years: 10.00     Quit date: 1/1/1969     Smokeless tobacco: Never Used     Alcohol use 0.0 oz/week     0 Standard drinks or equivalent per week      Comment: occasional- 2 drinks per month     Review of Systems  Constitutional-weight is up and down she says.  .  ENT-No earpain, sore throat, voice changes or rhinitis.  Cardiac-No chest pain or palpitations and Exertional SOB.  Respiratory-SOB.  Endocrine-Sugars are stable.    Physical Exam  /80  Pulse 98  Temp 98  F (36.7  C) (Temporal)  Resp 16  Wt 178 lb (80.7 kg)  SpO2 96%  BMI 30.55 kg/m2  General Appearance-alert, no distress, eating [employer] fries  Cardiac-regular rate and rhythm  with normal S1, S2 ; no murmur, rub or gallops  Lungs-clear to auscultation  Extremities-2+ pitting edema in both legs up above the knees  Skin-multiple skin tears and weeping, no cellulitis.    ASSESSMENT:  Patient is  here for hospital recheck after acute on chronic kidney failure. She did not need dialysis but improved with IV Bumex in the hospital. She was sent home with her furosemide at 40 mg a day. She says she is remembering to take this, pill compliance was a concern. She is eating [employer] today but says this is not normal. I did tell her that eating that kind of  food at home or at restaurants will not allow her to heal and has continued swelling and edema. We will check her labs from basic metabolic panel today as she had some low potassium, elevated creatinine in the hospital. We'll continue her Lasix at 40 mg. She'll see nephrology on Monday.    Recommended she continue to watch her weight. She does have home care which she needs that she is homebound and needs assistance with her congestive heart failure and chronic kidney failure.    Diabetes is stable with blood sugars around 150.    Patient does have some hypertension but I am hesitant to add back an ACE inhibitor she will see nephrology.    Electronically signed by Dheeraj Jj MD

## 2017-07-14 NOTE — PROGRESS NOTES
Clinic Care Coordination Contact  Care Coordination Communication    Referral Source: CTS    Clinical Data: Patient was hospitalized at [facility] from 7/6 to 7/10 with diagnosis of CHF.     Home Care Contact:              [facility]: [facility]              Contact name () and phone number: Leona PONCE [phone]              Care Coordination contacted home care: Yes              Anticipated start of care date: 7/14/17    Patient Contact:               Introduced self and role of care coordination.               Discharge instructions were reviewed with patient/caregiver.               Do you have any questions about your medications? no              Follow up appointment is scheduled for yes today at 215pm.              Provided 24 Hour Nurse Line and/or 24 Hour Appointment Scheduling: Yes              Home care has contacted patient: Yes              Patient questions/concerns: Called and spoke with pt, states she is doing well. Home care was suppose to come out today however, it is just before her appt and she does not feel liek she can do both.  She is going to call HC and have             them come tomorrow.     Plan: RN/SW Care Coordinator will await notification from home care staff informing RN/SW Care Coordinator of patients discharge plans/needs. RN/SW Care Coordinator will review chart and outreach to home care every 4 weeks and as needed.      Meenakshi Larsen RN, BSN  [place] [address]  Office: [phone]  Fax [phone]   [email or web address]   [email or web address]     Connect with [facility] on social media.

## 2017-07-14 NOTE — TELEPHONE ENCOUNTER
Reason for Call:  INR    Who is calling?  Home Care: [facility]      Phone number:  [phone]    Fax number:      Name of caller: Karly    INR Value:  1.6    Are there any other concerns:  No    Can we leave a detailed message on this number? YES    Phone number patient can be reached at: Home number on file [phone] (home)      Call taken on 7/14/2017 at 12:35 PM by Jo-Ann Bartlett

## 2017-07-14 NOTE — PROGRESS NOTES
ANTICOAGULATION FOLLOW-UP CLINIC VISIT    Patient Name:  Kathryn Banks  Date:  7/14/2017  Contact Type:  Telephone    SUBJECTIVE:   Pt hospitalized this past week. Coumadin has been held since last Friday.  Patient Findings     Comments Reason for Call:  INR     Who is calling?  Home Care: [facility]        Phone number:  [phone]     Fax number:       Name of caller: Karly     INR Value:  1.6     Are there any other concerns:  No     Can we leave a detailed message on this number? YES           OBJECTIVE    INR   Date Value Ref Range Status   07/14/2017 1.6  Final       ASSESSMENT / PLAN  INR assessment SUB    Recheck INR In: 3 DAYS    INR Location Homecare INR      Anticoagulation Summary as of 7/14/2017     INR goal 2.0-3.0   Today's INR 1.6!   Maintenance plan 1 mg (1 mg x 1) every day   Full instructions 1 mg every day   Weekly total 7 mg   Plan last modified Nicki Calvillo RN (7/14/2017)   Next INR check 7/17/2017   Target end date     Indications   Long-term (current) use of anticoagulants [Z79.01] [Z79.01]  Atrial fibrillation (H) (Resolved) [I48.91]  Paroxysmal atrial fibrillation (H) (Resolved) [I48.0]         Anticoagulation Episode Summary     INR check location     Preferred lab     Send INR reminders to [facility] RANDI    Comments 2.5 mg tablets, pm dose. Alere home monitor      Anticoagulation Care Providers     Provider Role Specialty Phone number    Dheeraj Jj MD [facility] Internal Medicine [phone]            See the Encounter Report to view Anticoagulation Flowsheet and Dosing Calendar (Go to Encounters tab in chart review, and find the Anticoagulation Therapy Visit)    Dosage adjustment made based on physician directed care plan.    Nicki Calvillo RN

## 2017-07-14 NOTE — MR AVS SNAPSHOT
After Visit Summary   7/14/2017    Kathryn Banks    MRN: [id number]           Patient Information     Date Of Birth          1940        Visit Information        Provider Department      7/14/2017 2:15 PM Dheeraj Jj MD [facility]         Follow-ups after your visit        Your next 10 appointments already scheduled     Jul 17, 2017 11:00 AM CDT   LAB with LAB FIRST FLOOR [facility] ([facility])    [address] [place] [address] [id number] [phone]           Patient must bring picture ID.  Patient should be prepared to give a urine specimen  Please do not eat 10-12 hours before your appointment if you are coming in fasting for labs on lipids, cholesterol, or glucose (sugar).  Pregnant women should follow their Care Team instructions. Water with medications is okay. Do not drink coffee or other fluids.   If you have concerns about taking  your medications, please ask at office or if scheduling via [employer], send a message by clicking on Secure Messaging, Message Your Care Team.            Jul 17, 2017 11:30 AM CDT   Return Visit with Vibha Barfield MD   [facility] ([facility])    [address] [place] [address] [id number] [phone]            Jul 27, 2017 11:00 AM CDT   Return Visit with Thaddeus King MD   [facility] ([facility]    [address] [place] [address] [id number] [phone]              Who to contact     If you have questions or need follow up information about today's clinic visit or your schedule please contact [facility] directly at [phone].  Normal or non-critical lab and imaging results will be communicated to you by MyChart, letter or phone within 4 business days after the clinic has received the results. If you do not hear from us within 7 days, please contact the clinic through MyChart or phone.  "If you have a critical or abnormal lab result, we will notify you by phone as soon as possible.  Submit refill requests through [employer] or call your pharmacy and they will forward the refill request to us. Please allow 3 business days for your refill to be completed.          Additional Information About Your Visit        [employer] Information     [employer] lets you send messages to your doctor, view your test results, renew your prescriptions, schedule appointments and more. To sign up, go to [email or web address] . Click on \"Log in\" on the left side of the screen, which will take you to the Welcome page. Then click on \"Sign up Now\" on the right side of the page.     You will be asked to enter the access code listed below, as well as some personal information. Please follow the directions to create your username and password.     Your access code is: [email or web address]  Expires: 2017 11:18 AM     Your access code will  in 90 days. If you need help or a new code, please call your [place] clinic or [phone].        Care EveryWhere ID     This is your Care EveryWhere ID. This could be used by other organizations to access your [place] medical records  [id number]        Your Vitals Were     Pulse Temperature Respirations Pulse Oximetry BMI (Body Mass Index)       98 98  F (36.7  C) (Temporal) 16 96% 30.55 kg/m2        Blood Pressure from Last 3 Encounters:   17 154/80   17 150/77   17 105/73    Weight from Last 3 Encounters:   17 178 lb (80.7 kg)   17 163 lb 8 oz (74.2 kg)   17 174 lb (78.9 kg)              Today, you had the following     No orders found for display         Today's Medication Changes          These changes are accurate as of: 17  2:18 PM.  If you have any questions, ask your nurse or doctor.               These medicines have changed or have updated prescriptions.        Dose/Directions    gabapentin 300 MG capsule   Commonly known as:  NEURONTIN "   This may have changed:  when to take this   Used for:  Diabetic polyneuropathy associated with type 2 diabetes mellitus (H)        Dose:  300 mg   Take 1 capsule (300 mg) by mouth At Bedtime   Refills:  0                Primary Care Provider Office Phone # Fax #    Dheeraj Jj [phone]       [facility] [address] [place] [address]        Equal Access to Services     DEVONTE BLAND : Hadii aad ku hadasho Soomaali, waaxda luqadaha, qaybta kaalmada adeegyada, waxay idiin hayaan adeeg khlincolnsh laCraigtimmyn ah. So [facility] [phone].    ATENCIÓN: Si habla espsrinath, tiene a medrano disposición servicios gratuitos de asistencia lingüística. Llame al [phone].    We comply with applicable federal civil rights laws and [place] laws. We do not discriminate on the basis of race, color, national origin, age, disability sex, sexual orientation or gender identity.            Thank you!     Thank you for choosing [facility]  for your care. Our goal is always to provide you with excellent care. Hearing back from our patients is one way we can continue to improve our services. Please take a few minutes to complete the written survey that you may receive in the mail after your visit with us. Thank you!             Your Updated Medication List - Protect others around you: Learn how to safely use, store and throw away your medicines at www.disposemymeds.org.          This list is accurate as of: 7/14/17  2:18 PM.  Always use your most recent med list.                   Brand Name Dispense Instructions for use Diagnosis    acetaminophen 325 MG tablet    TYLENOL    100 tablet    Take 2 tablets (650 mg) by mouth every 4 hours as needed for mild pain    Acute on chronic renal failure (H)       calcium acetate 667 MG Caps capsule    PHOSLO    180 capsule    Take 2 capsules (1,334 mg) by mouth 3 times daily (with meals)    Stage 4 chronic kidney disease (H)       calcium carbonate-vitamin  D 500-400 MG-UNIT Tabs per tablet      Take 1 tablet by mouth daily    Closed intertrochanteric fracture of left hip, initial encounter       cyanocobalamin 1000 MCG/ML injection    VITAMIN B12    1 mL    Inject 1 mL (1,000 mcg) into the muscle every 30 days    Vitamin B12 deficiency (non anemic)       ferrous sulfate 325 (65 FE) MG tablet    IRON    100 tablet    Take 1 tablet (325 mg) by mouth daily (with breakfast)    Iron deficiency anemia secondary to inadequate dietary iron intake       furosemide 40 MG tablet    LASIX    60 tablet    Take 1 tablet (40 mg) by mouth daily    Chronic diastolic heart failure (H)       gabapentin 300 MG capsule    NEURONTIN     Take 1 capsule (300 mg) by mouth At Bedtime    Diabetic polyneuropathy associated with type 2 diabetes mellitus (H)       metoprolol 25 MG tablet    LOPRESSOR    60 tablet    Take 2 tablets (50 mg) by mouth 2 times daily    Atrial fibrillation with rapid ventricular response (H)       nitroGLYcerin 0.4 MG sublingual tablet    NITROSTAT    25 tablet    Place 1 tablet (0.4 mg) under the tongue every 5 minutes as needed for chest pain    Hx of non-ST elevation myocardial infarction (NSTEMI), Atherosclerosis of native coronary artery of native heart without angina pectoris, Postsurgical aortocoronary bypass status       order for DME     1 Device    Equipment being ordered: cpap machine, mask, humidifier, tubing and filters    ANABEL (obstructive sleep apnea)       pramipexole 0.5 MG tablet    MIRAPEX     Take 3 tablets (1.5 mg) by mouth every evening    Restless legs syndrome (RLS)       pravastatin 40 MG tablet    PRAVACHOL    90 tablet    Take 1 tablet (40 mg) by mouth daily    Hx of non-ST elevation myocardial infarction (NSTEMI), Atherosclerosis of native coronary artery of native heart without angina pectoris, Type 2 diabetes mellitus with other circulatory complications (H)       sodium bicarbonate 650 MG tablet     90 tablet    Take 1 tablet (650 mg) by  mouth 3 times daily    Stage 4 chronic kidney disease (H)

## 2017-07-14 NOTE — MR AVS SNAPSHOT
Kathryn Banks   7/14/2017   Anticoagulation Therapy Visit    Description:  77 year old female   Provider:  Dheeraj Jj MD   Department:  Ph Anticoag           INR as of 7/14/2017     Today's INR 1.6!      Anticoagulation Summary as of 7/14/2017     INR goal 2.0-3.0   Today's INR 1.6!   Full instructions 1 mg every day   Next INR check 7/17/2017    Indications   Long-term (current) use of anticoagulants [Z79.01] [Z79.01]  Atrial fibrillation (H) (Resolved) [I48.91]  Paroxysmal atrial fibrillation (H) (Resolved) [I48.0]         Contact Numbers     Clinic Number:         July 2017 Details    Sun Mon Tue Wed Thu Fri Sat           1                 2               3               4               5               6               7               8                 9               10               11               12               13               14      1 mg   See details      15      1 mg           16      1 mg         17            18               19               20               21               22                 23               24               25               26               27               28               29                 30               31                     Date Details   07/14 This INR check       Date of next INR:  7/17/2017         How to take your warfarin dose     To take:  1 mg Take 1 of the 1 mg tablets.

## 2017-07-14 NOTE — TELEPHONE ENCOUNTER
See Anticoagulation visit encounter.  Dr. Jj, pt seeing you today. She has been off coumadin 7+ days. I have advised home care to have pt take 1 mg Fri, Sat, Sun and recheck INR on Monday. I wanted to verify with you ok for her to restart since you're seeing her today. Just let me know if otherwise. Thanks!        Nicki Calvillo, RN- [facility] RN

## 2017-07-14 NOTE — TELEPHONE ENCOUNTER
----- Message from Dheeraj Jj MD sent at 7/14/2017  3:53 PM CDT -----  Her kidneys are slightly better, her potassium is stable, continue as she is doing. Except limit salt

## 2017-07-14 NOTE — NURSING NOTE
"Chief Complaint   Patient presents with     Hospital F/U       Initial /80  Pulse 98  Temp 98  F (36.7  C) (Temporal)  Resp 16  Wt 178 lb (80.7 kg)  SpO2 96%  BMI 30.55 kg/m2 Estimated body mass index is 30.55 kg/(m^2) as calculated from the following:    Height as of 7/6/17: 5' 4\" (1.626 m).    Weight as of this encounter: 178 lb (80.7 kg).  Medication Reconciliation: complete    "

## 2017-07-17 ENCOUNTER — CARE COORDINATION (OUTPATIENT)
Dept: CARE COORDINATION | Facility: CLINIC | Age: 77
End: 2017-07-17

## 2017-07-17 ENCOUNTER — ANTICOAGULATION THERAPY VISIT (OUTPATIENT)
Dept: ANTICOAGULATION | Facility: CLINIC | Age: 77
End: 2017-07-17
Payer: COMMERCIAL

## 2017-07-17 ENCOUNTER — TELEPHONE (OUTPATIENT)
Dept: NEPHROLOGY | Facility: CLINIC | Age: 77
End: 2017-07-17

## 2017-07-17 ENCOUNTER — OFFICE VISIT (OUTPATIENT)
Dept: NEPHROLOGY | Facility: CLINIC | Age: 77
End: 2017-07-17
Payer: COMMERCIAL

## 2017-07-17 VITALS
BODY MASS INDEX: 30.62 KG/M2 | SYSTOLIC BLOOD PRESSURE: 160 MMHG | TEMPERATURE: 98.2 F | OXYGEN SATURATION: 98 % | HEART RATE: 76 BPM | DIASTOLIC BLOOD PRESSURE: 85 MMHG | WEIGHT: 178.4 LBS

## 2017-07-17 DIAGNOSIS — N18.4 ANEMIA DUE TO STAGE 4 CHRONIC KIDNEY DISEASE (H): ICD-10-CM

## 2017-07-17 DIAGNOSIS — E11.59 TYPE 2 DIABETES MELLITUS WITH OTHER CIRCULATORY COMPLICATIONS (H): ICD-10-CM

## 2017-07-17 DIAGNOSIS — D63.1 ANEMIA DUE TO STAGE 4 CHRONIC KIDNEY DISEASE (H): ICD-10-CM

## 2017-07-17 DIAGNOSIS — Z79.01 LONG-TERM (CURRENT) USE OF ANTICOAGULANTS: ICD-10-CM

## 2017-07-17 DIAGNOSIS — N18.4 ANEMIA OF CHRONIC RENAL FAILURE, STAGE 4 (SEVERE) (H): ICD-10-CM

## 2017-07-17 DIAGNOSIS — I10 ESSENTIAL HYPERTENSION WITH GOAL BLOOD PRESSURE LESS THAN 140/90: ICD-10-CM

## 2017-07-17 DIAGNOSIS — D50.9 IRON DEFICIENCY ANEMIA, UNSPECIFIED IRON DEFICIENCY ANEMIA TYPE: Primary | ICD-10-CM

## 2017-07-17 DIAGNOSIS — E87.20 METABOLIC ACIDOSIS, NORMAL ANION GAP (NAG): ICD-10-CM

## 2017-07-17 DIAGNOSIS — I48.0 PAROXYSMAL ATRIAL FIBRILLATION (H): ICD-10-CM

## 2017-07-17 DIAGNOSIS — N17.9 ACUTE KIDNEY INJURY (NONTRAUMATIC) (H): ICD-10-CM

## 2017-07-17 DIAGNOSIS — D63.1 ANEMIA OF CHRONIC RENAL FAILURE, STAGE 4 (SEVERE) (H): ICD-10-CM

## 2017-07-17 DIAGNOSIS — R31.29 MICROSCOPIC HEMATURIA: ICD-10-CM

## 2017-07-17 DIAGNOSIS — R60.9 EDEMA, UNSPECIFIED TYPE: ICD-10-CM

## 2017-07-17 LAB
ALBUMIN SERPL-MCNC: 2.5 G/DL (ref 3.4–5)
ANION GAP SERPL CALCULATED.3IONS-SCNC: 8 MMOL/L (ref 3–14)
BUN SERPL-MCNC: 37 MG/DL (ref 7–30)
CALCIUM SERPL-MCNC: 6.9 MG/DL (ref 8.5–10.1)
CHLORIDE SERPL-SCNC: 117 MMOL/L (ref 94–109)
CO2 SERPL-SCNC: 21 MMOL/L (ref 20–32)
CREAT SERPL-MCNC: 3.06 MG/DL (ref 0.52–1.04)
CREAT UR-MCNC: 22 MG/DL
FERRITIN SERPL-MCNC: 206 NG/ML (ref 8–252)
GFR SERPL CREATININE-BSD FRML MDRD: 15 ML/MIN/1.7M2
GLUCOSE SERPL-MCNC: 133 MG/DL (ref 70–99)
HGB BLD-MCNC: 7.5 G/DL (ref 11.7–15.7)
INR PPP: 1.06 (ref 0.86–1.14)
IRON SATN MFR SERPL: 16 % (ref 15–46)
IRON SERPL-MCNC: 23 UG/DL (ref 35–180)
PHOSPHATE SERPL-MCNC: 3.2 MG/DL (ref 2.5–4.5)
POTASSIUM SERPL-SCNC: 4.7 MMOL/L (ref 3.4–5.3)
PROT UR-MCNC: 0.28 G/L
PROT/CREAT 24H UR: 1.29 G/G CR (ref 0–0.2)
PTH-INTACT SERPL-MCNC: 726 PG/ML (ref 12–72)
SODIUM SERPL-SCNC: 146 MMOL/L (ref 133–144)
TIBC SERPL-MCNC: 145 UG/DL (ref 240–430)

## 2017-07-17 PROCEDURE — 84156 ASSAY OF PROTEIN URINE: CPT | Performed by: FAMILY MEDICINE

## 2017-07-17 PROCEDURE — 85610 PROTHROMBIN TIME: CPT | Performed by: INTERNAL MEDICINE

## 2017-07-17 PROCEDURE — 80069 RENAL FUNCTION PANEL: CPT | Performed by: INTERNAL MEDICINE

## 2017-07-17 PROCEDURE — 86160 COMPLEMENT ANTIGEN: CPT | Performed by: INTERNAL MEDICINE

## 2017-07-17 PROCEDURE — 99207 ZZC NO CHARGE NURSE ONLY: CPT | Performed by: INTERNAL MEDICINE

## 2017-07-17 PROCEDURE — 83970 ASSAY OF PARATHORMONE: CPT | Performed by: INTERNAL MEDICINE

## 2017-07-17 PROCEDURE — 82728 ASSAY OF FERRITIN: CPT | Performed by: INTERNAL MEDICINE

## 2017-07-17 PROCEDURE — 99214 OFFICE O/P EST MOD 30 MIN: CPT | Performed by: INTERNAL MEDICINE

## 2017-07-17 PROCEDURE — 36415 COLL VENOUS BLD VENIPUNCTURE: CPT | Performed by: INTERNAL MEDICINE

## 2017-07-17 PROCEDURE — 83540 ASSAY OF IRON: CPT | Performed by: INTERNAL MEDICINE

## 2017-07-17 PROCEDURE — 85018 HEMOGLOBIN: CPT | Performed by: INTERNAL MEDICINE

## 2017-07-17 PROCEDURE — 83550 IRON BINDING TEST: CPT | Performed by: INTERNAL MEDICINE

## 2017-07-17 NOTE — PROGRESS NOTES
ANTICOAGULATION FOLLOW-UP CLINIC VISIT    Patient Name:  Kathryn Banks  Date:  7/17/2017  Contact Type:  Telephone    SUBJECTIVE:     Patient Findings     Comments Called Karly, pt's HC nurse and gave instructions. There is a nurse going out today that will set up her pills.              OBJECTIVE    INR   Date Value Ref Range Status   07/17/2017 1.06 0.86 - 1.14 Final       ASSESSMENT / PLAN  INR assessment SUB    Recheck INR In: 2 DAYS    INR Location Outside lab      Anticoagulation Summary as of 7/17/2017     INR goal 2.0-3.0   Today's INR 1.06!   Maintenance plan No maintenance plan   Full instructions 7/17: 2.5 mg; 7/18: 2.5 mg   Plan last modified Nicki Calvillo RN (7/17/2017)   Next INR check 7/19/2017   Target end date     Indications   Long-term (current) use of anticoagulants [Z79.01] [Z79.01]  Atrial fibrillation (H) (Resolved) [I48.91]  Paroxysmal atrial fibrillation (H) (Resolved) [I48.0]         Anticoagulation Episode Summary     INR check location     Preferred lab     Send INR reminders to [facility] RANDI    Comments 2.5 mg tablets, pm dose. Alere home monitor      Anticoagulation Care Providers     Provider Role Specialty Phone number    Dheeraj Jj MD [facility] Internal Medicine [phone]            See the Encounter Report to view Anticoagulation Flowsheet and Dosing Calendar (Go to Encounters tab in chart review, and find the Anticoagulation Therapy Visit)    Dosage adjustment made based on physician directed care plan.    Nicki Calvillo RN

## 2017-07-17 NOTE — MR AVS SNAPSHOT
Kathryn MICKY Banks   7/17/2017   Anticoagulation Therapy Visit    Description:  77 year old female   Provider:  Dheeraj Jj MD   Department:   Anticoag           INR as of 7/17/2017     Today's INR 1.06!      Anticoagulation Summary as of 7/17/2017     INR goal 2.0-3.0   Today's INR 1.06!   Full instructions 7/17: 2.5 mg; 7/18: 2.5 mg   Next INR check 7/19/2017    Indications   Long-term (current) use of anticoagulants [Z79.01] [Z79.01]  Atrial fibrillation (H) (Resolved) [I48.91]  Paroxysmal atrial fibrillation (H) (Resolved) [I48.0]         Contact Numbers     Clinic Number:         July 2017 Details    Sun Mon Tue Wed Thu Fri Sat           1                 2               3               4               5               6               7               8                 9               10               11               12               13               14               15                 16               17      2.5 mg   See details      18      2.5 mg         19            20               21               22                 23               24               25               26               27               28               29                 30               31                     Date Details   07/17 This INR check       Date of next INR:  7/19/2017         How to take your warfarin dose     To take:  2.5 mg Take 1 of the 2.5 mg tablets.

## 2017-07-17 NOTE — TELEPHONE ENCOUNTER
Lab called with a critical value Hem 7.5. Reported critical value to Dr. Barfield.  Ashley Amador, CMA

## 2017-07-17 NOTE — PROGRESS NOTES
Clinic Care Coordination Contact  Care Team Conversations    Karly RN called back to let me know pt is signed up for there tele-monitoring plan for her heart failure.  RN will call RN CC when needed it is unlikely pt will be discharged from services.     Plan:  RN CC will continue to monitor chart for another 4 week and follow up with RN on status.         Meenakshi Larsen RN, BSN  Care Coordination    [address] [address]  Office: [phone]  Fax [phone]   [email or web address]   [email or web address]     Connect with [facility] on social media.

## 2017-07-17 NOTE — NURSING NOTE
"Kathryn Banks's goals for this visit include: CC  She requests these members of her care team be copied on today's visit information: No    PCP: Dheeraj Jj    Referring Provider:  No referring provider defined for this encounter.    Chief Complaint   Patient presents with     RECHECK       Initial There were no vitals taken for this visit. Estimated body mass index is 30.55 kg/(m^2) as calculated from the following:    Height as of 7/6/17: 1.626 m (5' 4\").    Weight as of 7/14/17: 80.7 kg (178 lb).  Medication Reconciliation: complete  "

## 2017-07-17 NOTE — PROGRESS NOTES
Clinic Care Coordination Contact  Care Team Conversations    Karly RN home care nurse called to discuss pt's monitoring program.  Writer called back but had to leave a message will wait for RN to call back.     Meenakshi Larsen RN, BSN  Care Coordination    [address] [address]  Office: [phone]  Fax [phone]   [email or web address]   [email or web address]     Connect with [facility] on social media.

## 2017-07-17 NOTE — PATIENT INSTRUCTIONS
1. Repeat labs again on Monday  2. Follow blood pressure at home daily for now. Please update if you find that it is consistently greater than 140/90. Arlet Webb, [phone]  3. Continue to follow weights as you have been.  4. Plan a one month follow-up or sooner as needed.   5. You will be getting contact from Yesy Villa from anemia services regarding more details on iron

## 2017-07-18 LAB
C3 SERPL-MCNC: 67 MG/DL (ref 76–169)
C4 SERPL-MCNC: 21 MG/DL (ref 15–50)

## 2017-07-19 ENCOUNTER — TELEPHONE (OUTPATIENT)
Dept: NEPHROLOGY | Facility: CLINIC | Age: 77
End: 2017-07-19

## 2017-07-19 ENCOUNTER — CARE COORDINATION (OUTPATIENT)
Dept: CARE COORDINATION | Facility: CLINIC | Age: 77
End: 2017-07-19

## 2017-07-19 ENCOUNTER — TELEPHONE (OUTPATIENT)
Dept: INTERNAL MEDICINE | Facility: CLINIC | Age: 77
End: 2017-07-19

## 2017-07-19 ENCOUNTER — ANTICOAGULATION THERAPY VISIT (OUTPATIENT)
Dept: ANTICOAGULATION | Facility: CLINIC | Age: 77
End: 2017-07-19
Payer: COMMERCIAL

## 2017-07-19 DIAGNOSIS — Z79.01 LONG-TERM (CURRENT) USE OF ANTICOAGULANTS: ICD-10-CM

## 2017-07-19 PROBLEM — D64.9 ANEMIA: Status: ACTIVE | Noted: 2017-07-19

## 2017-07-19 LAB — INR PPP: 1.1

## 2017-07-19 PROCEDURE — 99207 ZZC NO CHARGE NURSE ONLY: CPT | Performed by: INTERNAL MEDICINE

## 2017-07-19 NOTE — PROGRESS NOTES
ANTICOAGULATION FOLLOW-UP CLINIC VISIT    Patient Name:  Kathryn Banks  Date:  7/19/2017  Contact Type:  Telephone    SUBJECTIVE:     Patient Findings     Positives Med error (Karly (HC nurse) reports that all of pt's pills were still in her med box from last friday (7/14), so pt hasnt been taking her meds)    Comments Reason for Call:  INR     Who is calling?  Home Care: [facility]     Phone number:  [phone]     Fax number:       Name of caller: [facility]      INR Value:  1.1           OBJECTIVE    INR   Date Value Ref Range Status   07/19/2017 1.1  Final       ASSESSMENT / PLAN  INR assessment SUB    Recheck INR In: 2 DAYS    INR Location Homecare INR      Anticoagulation Summary as of 7/19/2017     INR goal 2.0-3.0   Today's INR 1.1!   Maintenance plan No maintenance plan   Full instructions 7/19: 2.5 mg; 7/20: 2.5 mg   Plan last modified Nicki Calvillo RN (7/17/2017)   Next INR check 7/21/2017   Target end date     Indications   Long-term (current) use of anticoagulants [Z79.01] [Z79.01]  Atrial fibrillation (H) (Resolved) [I48.91]  Paroxysmal atrial fibrillation (H) (Resolved) [I48.0]         Anticoagulation Episode Summary     INR check location     Preferred lab     Send INR reminders to [facility] RANDI    Comments 2.5 mg tablets, pm dose. Alere home monitor      Anticoagulation Care Providers     Provider Role Specialty Phone number    Dheeraj Jj MD [facility] Internal Medicine [phone]            See the Encounter Report to view Anticoagulation Flowsheet and Dosing Calendar (Go to Encounters tab in chart review, and find the Anticoagulation Therapy Visit)    Dosage adjustment made based on physician directed care plan.    Nicki Calvillo, ROSARIO

## 2017-07-19 NOTE — MR AVS SNAPSHOT
Kathryn Banks   7/19/2017   Anticoagulation Therapy Visit    Description:  77 year old female   Provider:  Dheeraj Jj MD   Department:  Ph Anticoag           INR as of 7/19/2017     Today's INR 1.1!      Anticoagulation Summary as of 7/19/2017     INR goal 2.0-3.0   Today's INR 1.1!   Full instructions 7/19: 2.5 mg; 7/20: 2.5 mg   Next INR check 7/21/2017    Indications   Long-term (current) use of anticoagulants [Z79.01] [Z79.01]  Atrial fibrillation (H) (Resolved) [I48.91]  Paroxysmal atrial fibrillation (H) (Resolved) [I48.0]         Contact Numbers     Clinic Number:         July 2017 Details    Sun Mon Tue Wed Thu Fri Sat           1                 2               3               4               5               6               7               8                 9               10               11               12               13               14               15                 16               17               18               19      2.5 mg   See details      20      2.5 mg         21            22                 23               24               25               26               27               28               29                 30               31                     Date Details   07/19 This INR check       Date of next INR:  7/21/2017         How to take your warfarin dose     To take:  2.5 mg Take 1 of the 2.5 mg tablets.

## 2017-07-19 NOTE — TELEPHONE ENCOUNTER
Left message with Kathryn's  for Kathryn to return call to discuss lab results and recommendations per Dr. Barfield result note.    Iron level remains low - I do feel we should set you up for a one time dose of IV iron to help boost your blood level. We will enroll you in our anemia services and you will be contacted to help arrange this IV iron infusion.   Arlet and Brodie: I would start with injectafer 750 mg IV X 1 and enroll her in anemia services. I did educated her on EPO today as well but I would like to start with the IV iron. Thank NOLAN ferraro     Therapy plan placed for Injectafter IV x 1 dose and referral for Anemia services placed.     Will await return call to review this information with Kathryn.    Arlet Webb, RN, BSN  Nephrology Care Coordinator  [facility]
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
## 2017-07-19 NOTE — TELEPHONE ENCOUNTER
Reason for Call:  INR    Who is calling?  Home Care: [facility]    Phone number:  [phone]    Fax number:      Name of caller: [facility]     INR Value:  1.1    Are there any other concerns:  No    Can we leave a detailed message on this number? YES    Phone number patient can be reached at: Other phone number: [facility]  # [phone]      Call taken on 7/19/2017 at 12:56 PM by Marli Belle

## 2017-07-21 ENCOUNTER — TELEPHONE (OUTPATIENT)
Dept: INTERNAL MEDICINE | Facility: CLINIC | Age: 77
End: 2017-07-21

## 2017-07-21 ENCOUNTER — ANTICOAGULATION THERAPY VISIT (OUTPATIENT)
Dept: ANTICOAGULATION | Facility: OTHER | Age: 77
End: 2017-07-21

## 2017-07-21 DIAGNOSIS — Z79.01 LONG-TERM (CURRENT) USE OF ANTICOAGULANTS: ICD-10-CM

## 2017-07-21 LAB — INR PPP: 1.1

## 2017-07-21 NOTE — TELEPHONE ENCOUNTER
Reason for Call:  INR    Who is calling?  Home Care: [facility]    Phone number:  [phone]    Fax number:      Name of caller: Karly    INR Value:  1.1    Are there any other concerns:  No    Can we leave a detailed message on this number? YES    Phone number patient can be reached at: Other phone number:  [phone]      Call taken on 7/21/2017 at 11:41 AM by Josephine Tejeda

## 2017-07-21 NOTE — PROGRESS NOTES
ANTICOAGULATION FOLLOW-UP CLINIC VISIT    Patient Name:  Kathryn Banks  Date:  7/21/2017  Contact Type:  Telephone    SUBJECTIVE:     Patient Findings     Positives Change in diet/appetite (Also has been eating broccoli soup the past 2 days), Missed doses    Comments Reason for Call:  INR     Who is calling?  Home Care: Karly     Phone number:  [phone]     Fax number:       Name of caller: Karly     INR Value:  1.1     Are there any other concerns:  No     Can we leave a detailed message on this number? YES     Phone number patient can be reached at: Other phone number:  [phone]           OBJECTIVE    INR   Date Value Ref Range Status   07/21/2017 1.1  Final       ASSESSMENT / PLAN  INR assessment SUB    Recheck INR In: 3 DAYS    INR Location Homecare INR      Anticoagulation Summary as of 7/21/2017     INR goal 2.0-3.0   Today's INR 1.1!   Maintenance plan No maintenance plan   Full instructions 7/21: 5 mg; 7/22: 5 mg; 7/23: 5 mg   Plan last modified Nicki Calvillo, RN (7/17/2017)   Next INR check 7/24/2017   Target end date     Indications   Long-term (current) use of anticoagulants [Z79.01] [Z79.01]  Atrial fibrillation (H) (Resolved) [I48.91]  Paroxysmal atrial fibrillation (H) (Resolved) [I48.0]         Anticoagulation Episode Summary     INR check location     Preferred lab     Send INR reminders to [facility]    Comments 2.5 mg tablets, pm dose. Alere home monitor      Anticoagulation Care Providers     Provider Role Specialty Phone number    Dheeraj Jj MD [facility] Internal Medicine [phone]            See the Encounter Report to view Anticoagulation Flowsheet and Dosing Calendar (Go to Encounters tab in chart review, and find the Anticoagulation Therapy Visit)    Dosage adjustment made based on physician directed care plan.    Nicki Calvillo, RN

## 2017-07-21 NOTE — MR AVS SNAPSHOT
Kathryn MICKY Banks   7/21/2017   Anticoagulation Therapy Visit    Description:  77 year old female   Provider:  Dheeraj Jj MD   Department:  [facility]           INR as of 7/21/2017     Today's INR 1.1!      Anticoagulation Summary as of 7/21/2017     INR goal 2.0-3.0   Today's INR 1.1!   Full instructions 7/21: 5 mg; 7/22: 5 mg; 7/23: 5 mg   Next INR check 7/24/2017    Indications   Long-term (current) use of anticoagulants [Z79.01] [Z79.01]  Atrial fibrillation (H) (Resolved) [I48.91]  Paroxysmal atrial fibrillation (H) (Resolved) [I48.0]         Contact Numbers     Clinic Number:         July 2017 Details    Sun Mon Tue Wed Thu Fri Sat           1                 2               3               4               5               6               7               8                 9               10               11               12               13               14               15                 16               17               18               19               20               21      5 mg   See details      22      5 mg           23      5 mg         24            25               26               27               28               29                 30               31                     Date Details   07/21 This INR check       Date of next INR:  7/24/2017         How to take your warfarin dose     To take:  5 mg Take 2 of the 2.5 mg tablets.

## 2017-07-24 ENCOUNTER — TELEPHONE (OUTPATIENT)
Dept: FAMILY MEDICINE | Facility: CLINIC | Age: 77
End: 2017-07-24

## 2017-07-24 ENCOUNTER — ANTICOAGULATION THERAPY VISIT (OUTPATIENT)
Dept: ANTICOAGULATION | Facility: OTHER | Age: 77
End: 2017-07-24

## 2017-07-24 DIAGNOSIS — Z79.01 LONG-TERM (CURRENT) USE OF ANTICOAGULANTS: ICD-10-CM

## 2017-07-24 LAB — INR PPP: 1.4

## 2017-07-24 NOTE — TELEPHONE ENCOUNTER
Reason for Call:  INR    Who is calling?  Home Care: Janine    Phone number:  [phone]    Fax number:      Name of caller:     INR Value:  1.4    Are there any other concerns:  No    Can we leave a detailed message on this number? YES    Phone number patient can be reached at: Other phone number:  [phone]*      Call taken on 7/24/2017 at 10:13 AM by Cristian Ortiz

## 2017-07-24 NOTE — PROGRESS NOTES
Yes, we all agree this would be the best, however, pt is adamit that she will not move out of her home.. Spoke with Karly this morning and their visit went ok on Friday.  Home care has set up life line and will be getting a M2 Medication dispenser for closer monitoring.  However, this does not prevent pt from taking out the medications and not taking them.  Pt states she did not miss any medications wed-Friday, but her INR was only 1.1.   Her response to that was she was eating a lot of green vegetables.  We would like to set up a care conference this week if possible, we can ask for extra time in your schedule to allow this to occur.  Please let me know if you would be able to participate and I can arrange for whenever you are available.     Thank you,    Meenakshi Larsen RN, BSN  Care Coordination    [address] [address]  Office: [phone]  Fax [phone]   [email or web address]   [email or web address]     Connect with [facility] on social media.

## 2017-07-24 NOTE — MR AVS SNAPSHOT
Kathryn Banks   7/24/2017   Anticoagulation Therapy Visit    Description:  77 year old female   Provider:  Dheeraj Jj MD   Department:  [facility]           INR as of 7/24/2017     Today's INR 1.4!      Anticoagulation Summary as of 7/24/2017     INR goal 2.0-3.0   Today's INR 1.4!   Full instructions 7/24: 5 mg; 7/25: 5 mg   Next INR check 7/26/2017    Indications   Long-term (current) use of anticoagulants [Z79.01] [Z79.01]  Atrial fibrillation (H) (Resolved) [I48.91]  Paroxysmal atrial fibrillation (H) (Resolved) [I48.0]         Contact Numbers     Clinic Number:         July 2017 Details    Sun Mon Tue Wed Thu Fri Sat           1                 2               3               4               5               6               7               8                 9               10               11               12               13               14               15                 16               17               18               19               20               21               22                 23               24      5 mg   See details      25      5 mg         26            27               28               29                 30               31                     Date Details   07/24 This INR check       Date of next INR:  7/26/2017         How to take your warfarin dose     To take:  5 mg Take 2 of the 2.5 mg tablets.

## 2017-07-24 NOTE — PROGRESS NOTES
ANTICOAGULATION FOLLOW-UP CLINIC VISIT    Patient Name:  Kathryn Banks  Date:  7/24/2017  Contact Type:  Telephone/ [facility] - homecare    SUBJECTIVE:     Patient Findings     Positives Unexplained INR or factor level change    Comments Reason for Call:  INR     Who is calling?  Home Care: Janine     Phone number:  [phone]     Fax number:       Name of caller:      INR Value:  1.4     Are there any other concerns:  No     Can we leave a detailed message on this number? YES     Phone number patient can be reached at: Other phone number:  [phone]*        Call taken on 7/24/2017 at 10:13 AM by Cristian Ortiz           OBJECTIVE    INR   Date Value Ref Range Status   07/24/2017 1.4  Final       ASSESSMENT / PLAN  INR assessment SUB    Recheck INR In: 2 DAYS    INR Location Homecare INR      Anticoagulation Summary as of 7/24/2017     INR goal 2.0-3.0   Today's INR 1.4!   Maintenance plan No maintenance plan   Full instructions 7/24: 5 mg; 7/25: 5 mg   Plan last modified Nicki Calvillo RN (7/17/2017)   Next INR check 7/26/2017   Target end date     Indications   Long-term (current) use of anticoagulants [Z79.01] [Z79.01]  Atrial fibrillation (H) (Resolved) [I48.91]  Paroxysmal atrial fibrillation (H) (Resolved) [I48.0]         Anticoagulation Episode Summary     INR check location     Preferred lab     Send INR reminders to [facility]    Comments 2.5 mg tablets, pm dose. Alere home monitor      Anticoagulation Care Providers     Provider Role Specialty Phone number    Dheeraj Jj MD [facility] Internal Medicine [phone]            See the Encounter Report to view Anticoagulation Flowsheet and Dosing Calendar (Go to Encounters tab in chart review, and find the Anticoagulation Therapy Visit)    Dosage adjustment made based on physician directed care plan.    Gianni Palumbo, RN

## 2017-07-26 ENCOUNTER — ANTICOAGULATION THERAPY VISIT (OUTPATIENT)
Dept: ANTICOAGULATION | Facility: CLINIC | Age: 77
End: 2017-07-26

## 2017-07-26 ENCOUNTER — TELEPHONE (OUTPATIENT)
Dept: INTERNAL MEDICINE | Facility: CLINIC | Age: 77
End: 2017-07-26

## 2017-07-26 DIAGNOSIS — Z79.01 LONG-TERM (CURRENT) USE OF ANTICOAGULANTS: ICD-10-CM

## 2017-07-26 DIAGNOSIS — N18.4 STAGE 4 CHRONIC KIDNEY DISEASE (H): ICD-10-CM

## 2017-07-26 LAB — INR PPP: 1.9

## 2017-07-26 NOTE — TELEPHONE ENCOUNTER
Reason for Call:  INR    Who is calling?  Home Care:     Phone number:  [phone]    Fax number:      Name of caller: Leona    INR Value:  1.9    Are there any other concerns:  Yes: 1 missed dose of coumadin     Can we leave a detailed message on this number? YES    Phone number patient can be reached at: Home number on file [phone] (home)      Call taken on 7/26/2017 at 1:51 PM by Sienna Lafleur

## 2017-07-26 NOTE — PROGRESS NOTES
I don't know that I can do a conference as my schedule is full and then I am gone next Wed for a week.

## 2017-07-26 NOTE — PROGRESS NOTES
Clinic Care Coordination Contact  Care Team Conversations    Called and spoke with Karly, states the M2 medication monitor will be set up today. Hopefully this will enable the pt to take her medications correctly with close monitoring from HC.  Writer has set up a mulit-disciplinary Care Conference for Aug 15th at 4pm.  Home Care RN and HC director, CCRN , CCSW, BHC will be in attendance.  Karly RN will let the pt know this care conference will occur.  Karly will keep us updated on any changes or immediate concerns.       Meenakshi Larsen RN, BSN  Care Coordination    [address] [address]  Office: [phone]  Fax [phone]   [email or web address]   [email or web address]     Connect with [facility] on social media.

## 2017-07-26 NOTE — MR AVS SNAPSHOT
Kathryn Banks   7/26/2017   Anticoagulation Therapy Visit    Description:  77 year old female   Provider:  Dheeraj Jj MD   Department:  [facility] Antico[facility]           INR as of 7/26/2017     Today's INR 1.9!      Anticoagulation Summary as of 7/26/2017     INR goal 2.0-3.0   Today's INR 1.9!   Full instructions 7/26: 5 mg; 7/27: 5 mg   Next INR check 7/28/2017    Indications   Long-term (current) use of anticoagulants [Z79.01] [Z79.01]  Atrial fibrillation (H) (Resolved) [I48.91]  Paroxysmal atrial fibrillation (H) (Resolved) [I48.0]         Contact Numbers     Clinic Number:         July 2017 Details    Sun Mon Tue Wed Thu Fri Sat           1                 2               3               4               5               6               7               8                 9               10               11               12               13               14               15                 16               17               18               19               20               21               22                 23               24               25               26      5 mg   See details      27      5 mg         28            29                 30               31                     Date Details   07/26 This INR check       Date of next INR:  7/28/2017         How to take your warfarin dose     To take:  5 mg Take 2 of the 2.5 mg tablets.

## 2017-07-26 NOTE — TELEPHONE ENCOUNTER
Sodium bicarbonate      Last Written Prescription Date: 6/25/17  Last Fill Quantity: 90,  # refills: 0   Last Office Visit with G, P or [facility] prescribing provider: 7/14/17                                         Next 5 appointments (look out 90 days)     Jul 27, 2017 11:00 AM CDT   Return Visit with Thaddeus King MD   [facility] ([address] [address]   [phone]            Aug 15, 2017  4:00 PM CDT   Office Visit with Dheeraj Jj MD   [facility]    [address] [place] [address]   [phone]            Aug 22, 2017  2:30 PM CDT   [facility]    [address] [place] [address]   [phone]

## 2017-07-26 NOTE — PROGRESS NOTES
ANTICOAGULATION FOLLOW-UP CLINIC VISIT    Patient Name:  Kathryn Banks  Date:  7/26/2017  Contact Type:  Telephone/ [facility] nurse    SUBJECTIVE:     Patient Findings     Positives Missed doses (accidently missed 5 mg dose of Monday)    Comments Reason for Call:  INR     Who is calling?  Home Care:      Phone number:  [phone]     Fax number:       Name of caller: Leona     INR Value:  1.9     Are there any other concerns:  Yes: 1 missed dose of coumadin      Can we leave a detailed message on this number? YES     Phone number patient can be reached at: Home number on file [phone] (home)        Call taken on 7/26/2017 at 1:51 PM by Sienna Lafleur           OBJECTIVE    INR   Date Value Ref Range Status   07/26/2017 1.9  Final       ASSESSMENT / PLAN  INR assessment THER    Recheck INR In: 2 DAYS    INR Location Homecare INR      Anticoagulation Summary as of 7/26/2017     INR goal 2.0-3.0   Today's INR 1.9!   Maintenance plan No maintenance plan   Full instructions 7/26: 5 mg; 7/27: 5 mg   Plan last modified Nicki Calvillo, RN (7/17/2017)   Next INR check 7/28/2017   Target end date     Indications   Long-term (current) use of anticoagulants [Z79.01] [Z79.01]  Atrial fibrillation (H) (Resolved) [I48.91]  Paroxysmal atrial fibrillation (H) (Resolved) [I48.0]         Anticoagulation Episode Summary     INR check location     Preferred lab     Send INR reminders to [facility]    Comments 2.5 mg tablets, pm dose. Alere home monitor      Anticoagulation Care Providers     Provider Role Specialty Phone number    Dheeraj Jj MD [facility] Internal Medicine [phone]            See the Encounter Report to view Anticoagulation Flowsheet and Dosing Calendar (Go to Encounters tab in chart review, and find the Anticoagulation Therapy Visit)    Dosage adjustment made based on physician directed care plan.    Missed Mondays dose.   Take 5 mg daily (=30 mg), recheck on Friday.     Zo Davis,  RN

## 2017-07-28 ENCOUNTER — ANTICOAGULATION THERAPY VISIT (OUTPATIENT)
Dept: ANTICOAGULATION | Facility: CLINIC | Age: 77
End: 2017-07-28
Payer: COMMERCIAL

## 2017-07-28 ENCOUNTER — TELEPHONE (OUTPATIENT)
Dept: INTERNAL MEDICINE | Facility: CLINIC | Age: 77
End: 2017-07-28

## 2017-07-28 DIAGNOSIS — Z79.01 LONG-TERM (CURRENT) USE OF ANTICOAGULANTS: ICD-10-CM

## 2017-07-28 DIAGNOSIS — N18.4 STAGE 4 CHRONIC KIDNEY DISEASE (H): Primary | ICD-10-CM

## 2017-07-28 LAB — INR POINT OF CARE: 3.1 (ref 0.86–1.14)

## 2017-07-28 PROCEDURE — 85610 PROTHROMBIN TIME: CPT | Mod: QW | Performed by: INTERNAL MEDICINE

## 2017-07-28 PROCEDURE — 36416 COLLJ CAPILLARY BLOOD SPEC: CPT | Performed by: INTERNAL MEDICINE

## 2017-07-28 RX ORDER — SODIUM BICARBONATE 650 MG/1
650 TABLET ORAL 3 TIMES DAILY
Qty: 90 TABLET | Refills: 0 | Status: ON HOLD | OUTPATIENT
Start: 2017-07-28 | End: 2017-08-18

## 2017-07-28 NOTE — TELEPHONE ENCOUNTER
Leona is calling stating that she can't be in the home for 3 hours and needs a call back regarding the INR. Please advise when able

## 2017-07-28 NOTE — MR AVS SNAPSHOT
Kathryn MICKY Banks   7/28/2017   Anticoagulation Therapy Visit    Description:  77 year old female   Provider:  Dheeraj Jj MD   Department:  [facility] Antico[facility]           INR as of 7/28/2017     Today's INR 3.1!      Anticoagulation Summary as of 7/28/2017     INR goal 2.0-3.0   Today's INR 3.1!   Full instructions 7/28: 2.5 mg; 7/29: 5 mg; 7/30: 5 mg   Next INR check 7/31/2017    Indications   Long-term (current) use of anticoagulants [Z79.01] [Z79.01]  Atrial fibrillation (H) (Resolved) [I48.91]  Paroxysmal atrial fibrillation (H) (Resolved) [I48.0]         Contact Numbers     Clinic Number:         July 2017 Details    Sun Mon Tue Wed Thu Fri Sat           1                 2               3               4               5               6               7               8                 9               10               11               12               13               14               15                 16               17               18               19               20               21               22                 23               24               25               26               27               28      2.5 mg   See details      29      5 mg           30      5 mg         31                  Date Details   07/28 This INR check       Date of next INR:  7/31/2017         How to take your warfarin dose     To take:  2.5 mg Take 1 of the 2.5 mg tablets.    To take:  5 mg Take 2 of the 2.5 mg tablets.

## 2017-07-28 NOTE — TELEPHONE ENCOUNTER
Reason for Call:  INR    Who is calling?  Home Care: Matt    Phone number:  [phone]    Name of caller: Leona    INR Value:  3.1    Are there any other concerns:  No, Leona is at the home now and needs a call back to give meds    Can we leave a detailed message on this number? YES    Phone number patient can be reached at: Other phone number:  [phone]    Call taken on 7/28/2017 at 12:14 PM by Tabitha Bland

## 2017-07-28 NOTE — TELEPHONE ENCOUNTER
Routing refill request to provider for review/approval because:  Drug not on the [facility] refill protocol   Prescribed by Dr. Aguilar Vu RN

## 2017-07-28 NOTE — PROGRESS NOTES
ANTICOAGULATION FOLLOW-UP CLINIC VISIT    Patient Name:  Kathryn Banks  Date:  7/28/2017  Contact Type:  Telephone/ [facility] nurse    SUBJECTIVE:     Patient Findings     Positives Missed doses (missed Mondays dose)    Comments Reason for Call:  INR     Who is calling?  Home Care: Matt     Phone number:  [phone]     Name of caller: Leona     INR Value:  3.1     Are there any other concerns:  No, Leona is at the home now and needs a call back to give meds     Can we leave a detailed message on this number? YES     Phone number patient can be reached at: Other phone number:  [phone]     Call taken on 7/28/2017 at 12:14 PM by Tabitha Bland           OBJECTIVE    INR Protime   Date Value Ref Range Status   07/28/2017 3.1 (A) 0.86 - 1.14 Final       ASSESSMENT / PLAN  INR assessment THER    Recheck INR In: 3 DAYS    INR Location Homecare INR      Anticoagulation Summary as of 7/28/2017     INR goal 2.0-3.0   Today's INR 3.1!   Maintenance plan No maintenance plan   Full instructions 7/28: 2.5 mg; 7/29: 5 mg; 7/30: 5 mg   Plan last modified Nicki Calvillo, RN (7/17/2017)   Next INR check 7/31/2017   Target end date     Indications   Long-term (current) use of anticoagulants [Z79.01] [Z79.01]  Atrial fibrillation (H) (Resolved) [I48.91]  Paroxysmal atrial fibrillation (H) (Resolved) [I48.0]         Anticoagulation Episode Summary     INR check location     Preferred lab     Send INR reminders to [facility]    Comments 2.5 mg tablets, pm dose. Alere home monitor      Anticoagulation Care Providers     Provider Role Specialty Phone number    Dheeraj Jj MD [facility] Internal Medicine [phone]            See the Encounter Report to view Anticoagulation Flowsheet and Dosing Calendar (Go to Encounters tab in chart review, and find the Anticoagulation Therapy Visit)    Dosage adjustment made based on physician directed care plan.        Zo Davis, ROSARIO

## 2017-07-31 ENCOUNTER — TELEPHONE (OUTPATIENT)
Dept: INTERNAL MEDICINE | Facility: CLINIC | Age: 77
End: 2017-07-31

## 2017-07-31 ENCOUNTER — ANTICOAGULATION THERAPY VISIT (OUTPATIENT)
Dept: ANTICOAGULATION | Facility: CLINIC | Age: 77
End: 2017-07-31

## 2017-07-31 DIAGNOSIS — Z79.01 LONG-TERM (CURRENT) USE OF ANTICOAGULANTS: ICD-10-CM

## 2017-07-31 LAB — INR PPP: 4.6

## 2017-07-31 NOTE — TELEPHONE ENCOUNTER
Reason for Call:  INR    Who is calling?  Home Care: Janine    Phone number:  [phone]    Fax number:      Name of caller: Janine    INR Value:  4.6    Are there any other concerns:  No    Can we leave a detailed message on this number? YES    Phone number patient can be reached at: [phone]      Call taken on 7/31/2017 at 10:06 AM by Marli Belle

## 2017-07-31 NOTE — PROGRESS NOTES
ANTICOAGULATION FOLLOW-UP CLINIC VISIT    Patient Name:  Kathryn Banks  Date:  7/31/2017  Contact Type:  Telephone/ [facility]ine - homecare    SUBJECTIVE:     Patient Findings     Positives CHF control change (Has had some weight gain due to fluid )    Comments Reason for Call:  INR     Who is calling?  Home Care: Janine     Phone number:  [phone]     Fax number:       Name of caller: Janine     INR Value:  4.6     Are there any other concerns:  No     Can we leave a detailed message on this number? YES     Phone number patient can be reached at: [phone]        Call taken on 7/31/2017 at 10:06 AM by Marli Belle           OBJECTIVE    INR   Date Value Ref Range Status   07/31/2017 4.6  Final       ASSESSMENT / PLAN  INR assessment SUPRA    Recheck INR In: 2 DAYS    INR Location Homecare INR      Anticoagulation Summary as of 7/31/2017     INR goal 2.0-3.0   Today's INR 4.6!   Maintenance plan No maintenance plan   Full instructions 7/31: Hold; 8/1: 2.5 mg   Plan last modified Nicki Calvillo, RN (7/17/2017)   Next INR check 8/2/2017   Target end date     Indications   Long-term (current) use of anticoagulants [Z79.01] [Z79.01]  Atrial fibrillation (H) (Resolved) [I48.91]  Paroxysmal atrial fibrillation (H) (Resolved) [I48.0]         Anticoagulation Episode Summary     INR check location     Preferred lab     Send INR reminders to [facility]    Comments 2.5 mg tablets, pm dose. Alere home monitor      Anticoagulation Care Providers     Provider Role Specialty Phone number    Dheeraj Jj MD [facility] Internal Medicine [phone]            See the Encounter Report to view Anticoagulation Flowsheet and Dosing Calendar (Go to Encounters tab in chart review, and find the Anticoagulation Therapy Visit)    Dosage adjustment made based on physician directed care plan.    Gianni Palumbo, RN

## 2017-07-31 NOTE — MR AVS SNAPSHOT
Kathryn WEEKS Jocelyn   7/31/2017   Anticoagulation Therapy Visit    Description:  77 year old female   Provider:  Dheeraj Jj MD   Department:   Anticoag           INR as of 7/31/2017     Today's INR 4.6!      Anticoagulation Summary as of 7/31/2017     INR goal 2.0-3.0   Today's INR 4.6!   Full instructions 7/31: Hold; 8/1: 2.5 mg   Next INR check 8/2/2017    Indications   Long-term (current) use of anticoagulants [Z79.01] [Z79.01]  Atrial fibrillation (H) (Resolved) [I48.91]  Paroxysmal atrial fibrillation (H) (Resolved) [I48.0]         Contact Numbers     Clinic Number:         July 2017 Details    Sun Mon Tue Wed Thu Fri Sat           1                 2               3               4               5               6               7               8                 9               10               11               12               13               14               15                 16               17               18               19               20               21               22                 23               24               25               26               27               28               29                 30               31      Hold   See details            Date Details   07/31 This INR check               How to take your warfarin dose     Hold Do not take your warfarin dose. See the Details table to the right for additional instructions.                August 2017 Details    Sun Mon Tue Wed Thu Fri Sat       1      2.5 mg         2            3               4               5                 6               7               8               9               10               11               12                 13               14               15               16               17               18               19                 20               21               22               23               24               25               26                 27               28               29                30               31                  Date Details   No additional details    Date of next INR:  8/2/2017         How to take your warfarin dose     To take:  2.5 mg Take 1 of the 2.5 mg tablets.

## 2017-08-01 DIAGNOSIS — I48.91 ATRIAL FIBRILLATION WITH RAPID VENTRICULAR RESPONSE (H): ICD-10-CM

## 2017-08-01 RX ORDER — METOPROLOL TARTRATE 50 MG
50 TABLET ORAL 2 TIMES DAILY
Qty: 180 TABLET | Refills: 3 | Status: SHIPPED | OUTPATIENT
Start: 2017-08-01 | End: 2017-08-10

## 2017-08-02 ENCOUNTER — ANTICOAGULATION THERAPY VISIT (OUTPATIENT)
Dept: ANTICOAGULATION | Facility: CLINIC | Age: 77
End: 2017-08-02

## 2017-08-02 ENCOUNTER — TELEPHONE (OUTPATIENT)
Dept: INTERNAL MEDICINE | Facility: CLINIC | Age: 77
End: 2017-08-02

## 2017-08-02 DIAGNOSIS — Z79.01 LONG-TERM (CURRENT) USE OF ANTICOAGULANTS: ICD-10-CM

## 2017-08-02 LAB — INR PPP: 3.8

## 2017-08-02 NOTE — PROGRESS NOTES
ANTICOAGULATION FOLLOW-UP CLINIC VISIT    Patient Name:  Kathryn Banks  Date:  8/2/2017  Contact Type:  Telephone/ Karly, HC nurse    SUBJECTIVE:     Patient Findings     Positives Intentional hold of therapy (held on Mon due to elevated INR')    Comments Reason for Call:  INR     Who is calling?  Home Care:      Phone number:  [phone]     Fax number:       Name of caller: Karly     INR Value:  3.8     Are there any other concerns:  No     Can we leave a detailed message on this number? YES     Phone number patient can be reached at: Home number on file [phone] (home)        Call taken on 8/2/2017 at 1:21 PM by Jo-Ann Bartlett           OBJECTIVE    INR   Date Value Ref Range Status   08/02/2017 3.8  Final       ASSESSMENT / PLAN  INR assessment SUPRA    Recheck INR In: 2 DAYS    INR Location Homecare INR      Anticoagulation Summary as of 8/2/2017     INR goal 2.0-3.0   Today's INR 3.8!   Maintenance plan No maintenance plan   Full instructions 8/2: 2.5 mg; 8/3: 2.5 mg   Plan last modified Nicki Calvillo, RN (7/17/2017)   Next INR check 8/4/2017   Target end date     Indications   Long-term (current) use of anticoagulants [Z79.01] [Z79.01]  Atrial fibrillation (H) (Resolved) [I48.91]  Paroxysmal atrial fibrillation (H) (Resolved) [I48.0]         Anticoagulation Episode Summary     INR check location     Preferred lab     Send INR reminders to [facility]    Comments 2.5 mg tablets, pm dose. Alere home monitor      Anticoagulation Care Providers     Provider Role Specialty Phone number    Dheeraj Jj MD [facility] Internal Medicine [phone]            See the Encounter Report to view Anticoagulation Flowsheet and Dosing Calendar (Go to Encounters tab in chart review, and find the Anticoagulation Therapy Visit)    Dosage adjustment made based on physician directed care plan.  2.5 mg Wed, Thurs = 20 mg     Zo Davis, ROSARIO

## 2017-08-02 NOTE — MR AVS SNAPSHOT
Kathryn MICKY Banks   8/2/2017   Anticoagulation Therapy Visit    Description:  77 year old female   Provider:  Dheeraj Jj MD   Department:  Ph Anticoag           INR as of 8/2/2017     Today's INR 3.8!      Anticoagulation Summary as of 8/2/2017     INR goal 2.0-3.0   Today's INR 3.8!   Full instructions 8/2: 2.5 mg; 8/3: 2.5 mg   Next INR check 8/4/2017    Indications   Long-term (current) use of anticoagulants [Z79.01] [Z79.01]  Atrial fibrillation (H) (Resolved) [I48.91]  Paroxysmal atrial fibrillation (H) (Resolved) [I48.0]         Contact Numbers     Clinic Number:         August 2017 Details    Sun Mon Tue Wed Thu Fri Sat       1               2      2.5 mg   See details      3      2.5 mg         4            5                 6               7               8               9               10               11               12                 13               14               15               16               17               18               19                 20               21               22               23               24               25               26                 27               28               29               30               31                  Date Details   08/02 This INR check       Date of next INR:  8/4/2017         How to take your warfarin dose     To take:  2.5 mg Take 1 of the 2.5 mg tablets.

## 2017-08-02 NOTE — TELEPHONE ENCOUNTER
Reason for Call:  INR    Who is calling?  Home Care:     Phone number:  [phone]    Fax number:      Name of caller: Karly    INR Value:  3.8    Are there any other concerns:  No    Can we leave a detailed message on this number? YES    Phone number patient can be reached at: Home number on file [phone] (home)      Call taken on 8/2/2017 at 1:21 PM by Jo-Ann Bartlett

## 2017-08-04 ENCOUNTER — TELEPHONE (OUTPATIENT)
Dept: INTERNAL MEDICINE | Facility: CLINIC | Age: 77
End: 2017-08-04

## 2017-08-04 ENCOUNTER — ANTICOAGULATION THERAPY VISIT (OUTPATIENT)
Dept: ANTICOAGULATION | Facility: CLINIC | Age: 77
End: 2017-08-04

## 2017-08-04 DIAGNOSIS — Z79.01 LONG-TERM (CURRENT) USE OF ANTICOAGULANTS: ICD-10-CM

## 2017-08-04 LAB — INR PPP: 3.8

## 2017-08-04 NOTE — MR AVS SNAPSHOT
Kathryn MICKY Banks   8/4/2017   Anticoagulation Therapy Visit    Description:  77 year old female   Provider:  Dheeraj Jj MD   Department:  Ph Anticoag           INR as of 8/4/2017     Today's INR 3.8!      Anticoagulation Summary as of 8/4/2017     INR goal 2.0-3.0   Today's INR 3.8!   Full instructions 8/4: 1.25 mg; 8/5: 2.5 mg; 8/6: 2.5 mg   Next INR check 8/7/2017    Indications   Long-term (current) use of anticoagulants [Z79.01] [Z79.01]  Atrial fibrillation (H) (Resolved) [I48.91]  Paroxysmal atrial fibrillation (H) (Resolved) [I48.0]         Contact Numbers     Clinic Number:         August 2017 Details    Sun Mon Tue Wed Thu Fri Sat       1               2               3               4      1.25 mg   See details      5      2.5 mg           6      2.5 mg         7            8               9               10               11               12                 13               14               15               16               17               18               19                 20               21               22               23               24               25               26                 27               28               29               30               31                  Date Details   08/04 This INR check       Date of next INR:  8/7/2017         How to take your warfarin dose     To take:  1.25 mg Take 0.5 of a 2.5 mg tablet.    To take:  2.5 mg Take 1 of the 2.5 mg tablets.

## 2017-08-04 NOTE — TELEPHONE ENCOUNTER
Reason for Call:  INR    Who is calling?  Home Care: [facility]     Phone number:  [phone]    Name of caller:  Selma    INR Value:  3.8    Are there any other concerns:  No, Selma states that she is in the patients home now and would like a call back asap    Can we leave a detailed message on this number? YES    Phone number can be reached at: Other phone number:  [phone]      Call taken on 8/4/2017 at 10:07 AM by Tabitha Bland

## 2017-08-04 NOTE — PROGRESS NOTES
ANTICOAGULATION FOLLOW-UP CLINIC VISIT    Patient Name:  Kathryn Banks  Date:  8/4/2017  Contact Type:  Telephone/ Selma  nurse    SUBJECTIVE:     Patient Findings     Positives Intentional hold of therapy (held on Monday due to elevated INR)    Comments Reason for Call:  INR     Who is calling?  Home Care: [facility]      Phone number:  [phone]     Name of caller:  Selma     INR Value:  3.8     Are there any other concerns:  No, Selma states that she is in the patients home now and would like a call back asap     Can we leave a detailed message on this number? YES     Phone number can be reached at: Other phone number:  [phone]        Call taken on 8/4/2017 at 10:07 AM by Tabitha Bland           OBJECTIVE    INR   Date Value Ref Range Status   08/04/2017 3.8  Final       ASSESSMENT / PLAN  INR assessment SUPRA    Recheck INR In: 3 DAYS    INR Location Home[facility] INR      Anticoagulation Summary as of 8/4/2017     INR goal 2.0-3.0   Today's INR 3.8!   Maintenance plan No maintenance plan   Full instructions 8/4: 1.25 mg; 8/5: 2.5 mg; 8/6: 2.5 mg   Plan last modified Nicki Calvillo, RN (7/17/2017)   Next INR check 8/7/2017   Target end date     Indications   Long-term (current) use of anticoagulants [Z79.01] [Z79.01]  Atrial fibrillation (H) (Resolved) [I48.91]  Paroxysmal atrial fibrillation (H) (Resolved) [I48.0]         Anticoagulation Episode Summary     INR check location     Preferred lab     Send INR reminders to [facility]    Comments 2.5 mg tablets, pm dose. Alere home monitor      Anticoagulation Care Providers     Provider Role Specialty Phone number    Dheeraj Jj MD [facility] Internal Medicine [phone]            See the Encounter Report to view Anticoagulation Flowsheet and Dosing Calendar (Go to Encounters tab in chart review, and find the Anticoagulation Therapy Visit)    Dosage adjustment made based on physician directed care plan.      Zo Davis, ROSARIO

## 2017-08-07 ENCOUNTER — ANTICOAGULATION THERAPY VISIT (OUTPATIENT)
Dept: ANTICOAGULATION | Facility: CLINIC | Age: 77
End: 2017-08-07

## 2017-08-07 ENCOUNTER — TELEPHONE (OUTPATIENT)
Dept: INTERNAL MEDICINE | Facility: CLINIC | Age: 77
End: 2017-08-07

## 2017-08-07 DIAGNOSIS — Z79.01 LONG-TERM (CURRENT) USE OF ANTICOAGULANTS: ICD-10-CM

## 2017-08-07 LAB — INR PPP: 3.8

## 2017-08-07 NOTE — TELEPHONE ENCOUNTER
Reason for Call:  INR    Who is calling?  Home Care:     Phone number:      Fax number:      Name of caller:     INR Value:  3.8    Are there any other concerns:  No    Can we leave a detailed message on this number? YES    Phone number patient can be reached at:     Call taken on 8/7/2017 at 9:18 AM by Amanda Saba

## 2017-08-07 NOTE — PROGRESS NOTES
ANTICOAGULATION FOLLOW-UP CLINIC VISIT    Patient Name:  Kathryn Banks  Date:  8/7/2017  Contact Type:  Telephone    SUBJECTIVE:     Patient Findings     Comments Reason for Call:  INR     Who is calling?  Home Care:      Phone number:       Fax number:       Name of caller:      INR Value:  3.8     Are there any other concerns:  No     Can we leave a detailed message on this number? YES     Phone number patient can be reached at:      Call taken on 8/7/2017 at 9:18 AM by Amanda Saba              OBJECTIVE    INR   Date Value Ref Range Status   08/07/2017 3.8  Final       ASSESSMENT / PLAN  INR assessment SUPRA    Recheck INR In: 2 DAYS    INR Location Home[facility] INR      Anticoagulation Summary as of 8/7/2017     INR goal 2.0-3.0   Today's INR 3.8!   Maintenance plan No maintenance plan   Full instructions 8/7: Hold; 8/8: 2.5 mg   Plan last modified Nicki Calvillo RN (7/17/2017)   Next INR check 8/9/2017   Target end date     Indications   Long-term (current) use of anticoagulants [Z79.01] [Z79.01]  Atrial fibrillation (H) (Resolved) [I48.91]  Paroxysmal atrial fibrillation (H) (Resolved) [I48.0]         Anticoagulation Episode Summary     INR check location     Preferred lab     Send INR reminders to [facility]    Comments 2.5 mg tablets, pm dose. Alere home monitor      Anticoagulation Care Providers     Provider Role Specialty Phone number    Dheeraj Jj MD [facility] Internal Medicine [phone]            See the Encounter Report to view Anticoagulation Flowsheet and Dosing Calendar (Go to Encounters tab in chart review, and find the Anticoagulation Therapy Visit)    Dosage adjustment made based on physician directed care plan.    Nicki Calvillo RN

## 2017-08-07 NOTE — MR AVS SNAPSHOT
Kathryn MICKY Banks   8/7/2017   Anticoagulation Therapy Visit    Description:  77 year old female   Provider:  Dheeraj Jj MD   Department:  Ph Anticoag           INR as of 8/7/2017     Today's INR 3.8!      Anticoagulation Summary as of 8/7/2017     INR goal 2.0-3.0   Today's INR 3.8!   Full instructions 8/7: Hold; 8/8: 2.5 mg   Next INR check 8/9/2017    Indications   Long-term (current) use of anticoagulants [Z79.01] [Z79.01]  Atrial fibrillation (H) (Resolved) [I48.91]  Paroxysmal atrial fibrillation (H) (Resolved) [I48.0]         Contact Numbers     Clinic Number:         August 2017 Details    Sun Mon Tue Wed Thu Fri Sat       1               2               3               4               5                 6               7      Hold   See details      8      2.5 mg         9            10               11               12                 13               14               15               16               17               18               19                 20               21               22               23               24               25               26                 27               28               29               30               31                  Date Details   08/07 This INR check       Date of next INR:  8/9/2017         How to take your warfarin dose     To take:  2.5 mg Take 1 of the 2.5 mg tablets.    Hold Do not take your warfarin dose. See the Details table to the right for additional instructions.

## 2017-08-09 ENCOUNTER — TELEPHONE (OUTPATIENT)
Dept: PHARMACY | Facility: CLINIC | Age: 77
End: 2017-08-09

## 2017-08-09 ENCOUNTER — DOCUMENTATION ONLY (OUTPATIENT)
Dept: CARE COORDINATION | Facility: CLINIC | Age: 77
End: 2017-08-09

## 2017-08-09 ENCOUNTER — ANTICOAGULATION THERAPY VISIT (OUTPATIENT)
Dept: ANTICOAGULATION | Facility: CLINIC | Age: 77
End: 2017-08-09

## 2017-08-09 ENCOUNTER — TELEPHONE (OUTPATIENT)
Dept: INTERNAL MEDICINE | Facility: CLINIC | Age: 77
End: 2017-08-09

## 2017-08-09 DIAGNOSIS — N18.4 CKD (CHRONIC KIDNEY DISEASE) STAGE 4, GFR 15-29 ML/MIN (H): ICD-10-CM

## 2017-08-09 DIAGNOSIS — N18.4 ANEMIA OF CHRONIC RENAL FAILURE, STAGE 4 (SEVERE) (H): Primary | ICD-10-CM

## 2017-08-09 DIAGNOSIS — Z79.01 LONG-TERM (CURRENT) USE OF ANTICOAGULANTS: ICD-10-CM

## 2017-08-09 DIAGNOSIS — D63.1 ANEMIA OF CHRONIC RENAL FAILURE, STAGE 4 (SEVERE) (H): Primary | ICD-10-CM

## 2017-08-09 PROBLEM — D50.9 ANEMIA, IRON DEFICIENCY: Status: ACTIVE | Noted: 2017-08-09

## 2017-08-09 LAB — INR PPP: 3.8

## 2017-08-09 NOTE — PROGRESS NOTES
ANTICOAGULATION FOLLOW-UP CLINIC VISIT    Patient Name:  Kathryn Banks  Date:  8/9/2017  Contact Type:  Telephone/ Janine HC nurse    SUBJECTIVE:     Patient Findings     Positives Other complaints (States legs are weeping, but pt has had no changes in diuretics for over a week), Intentional hold of therapy (HELD dose Mon), Unexplained INR or factor level change    Comments S/O:  Janine from  home care calls with pt's INR today of 3.8.  Kathryn Banks is on Coumadin for A. Fib.  Current Coumadin dose is 2.5 mg Wed, 2.5 mg Thu, 1.25 mg Fri, 2.5 Sat, 2.5 Sun, 0 mg Mon and 2.5 mg Tues=13.75 mg.   Concerns today are: States legs are weeping, but pt has had no changes in diuretics in the past week.    A/P: Kathryn Dunns INR is Supratherapeutic  for his/her goal range of 2 - 3.  Reasons why INR is out of range may include:Unknown.  Recommended to have Kathryn Banks decrease Coumadin dose to 1.25 mg Wed, 2.5 mg Thurs and to have his/her INR rechecked in 2 days on 8/11/17.      Yovani Ruvalcaba RN, BSN               OBJECTIVE    INR   Date Value Ref Range Status   08/09/2017 3.8  Final       ASSESSMENT / PLAN  INR assessment SUPRA    Recheck INR In: 2 DAYS    INR Location Home[facility] INR      Anticoagulation Summary as of 8/9/2017     INR goal 2.0-3.0   Today's INR 3.8!   Maintenance plan No maintenance plan   Full instructions 8/9: 1.25 mg; 8/10: 2.5 mg   Plan last modified Nicki Calvillo, RN (7/17/2017)   Next INR check 8/11/2017   Target end date     Indications   Long-term (current) use of anticoagulants [Z79.01] [Z79.01]  Atrial fibrillation (H) (Resolved) [I48.91]  Paroxysmal atrial fibrillation (H) (Resolved) [I48.0]         Anticoagulation Episode Summary     INR check location     Preferred lab     Send INR reminders to [facility]    Comments 2.5 mg tablets, pm dose. Alere home monitor      Anticoagulation Care Providers     Provider Role Specialty Phone number    Dheeraj Jj MD Responsible Internal  Medicine [phone]            See the Encounter Report to view Anticoagulation Flowsheet and Dosing Calendar (Go to Encounters tab in chart review, and find the Anticoagulation Therapy Visit)    Dosage adjustment made based on physician directed care plan.    Yovani Ruvalcaba RN

## 2017-08-09 NOTE — TELEPHONE ENCOUNTER
Reason for Call:  INR    Who is calling?  Home Care: [facility]    Phone number:  [phone]    Fax number:  n/a    Name of caller: Janine    INR Value:  3.8    Are there any other concerns:  No- is waiting in home for a call back for medication setup    Can we leave a detailed message on this number? YES    Phone number patient can be reached at: Other phone number:  [phone]      Call taken on 8/9/2017 at 9:42 AM by Judson Erazo

## 2017-08-09 NOTE — MR AVS SNAPSHOT
Kathryn MICKY Banks   8/9/2017   Anticoagulation Therapy Visit    Description:  77 year old female   Provider:  Dheeraj Jj MD   Department:  Ph Anticoag           INR as of 8/9/2017     Today's INR 3.8!      Anticoagulation Summary as of 8/9/2017     INR goal 2.0-3.0   Today's INR 3.8!   Full instructions 8/9: 1.25 mg; 8/10: 2.5 mg   Next INR check 8/11/2017    Indications   Long-term (current) use of anticoagulants [Z79.01] [Z79.01]  Atrial fibrillation (H) (Resolved) [I48.91]  Paroxysmal atrial fibrillation (H) (Resolved) [I48.0]         Contact Numbers     Clinic Number:         August 2017 Details    Sun Mon Tue Wed Thu Fri Sat       1               2               3               4               5                 6               7               8               9      1.25 mg   See details      10      2.5 mg         11            12                 13               14               15               16               17               18               19                 20               21               22               23               24               25               26                 27               28               29               30               31                  Date Details   08/09 This INR check       Date of next INR:  8/11/2017         How to take your warfarin dose     To take:  1.25 mg Take 0.5 of a 2.5 mg tablet.    To take:  2.5 mg Take 1 of the 2.5 mg tablets.

## 2017-08-09 NOTE — LETTER
August 11, 2017        Kathryn Banks  [id number]  [place] [address]        Dear Kathryn,     Welcome to the Anemia Clinic!  You have been referred to our clinic by Vibha Barfield MD for the monitoring of your anemia therapy.  The Anemia Clinic is a referral clinic staffed by [facility]ists.    Our goals in monitoring your anemia therapy include:       Optimizing your anemia therapy.    Providing you with appropriate education and resources concerning your anemia therapy.       Monitoring your lab values (Hemoglobin and iron) and adjusting your anemia therapy as needed.  Your Hemoglobin Goal = 9-10 g/dl      If you use an outside lab to obtain blood draws, it will be your responsibility to report all lab results to the Anemia Clinic.  Follow-up attempts will be made for one month, at that time if we have not heard back from you we will inactive your anemia prescriptions and refer your management back to the referring provider.  Please call the Anemia Clinic at [phone] if you have any questions.  Sincerely,  Yesy Samaniego, HazelD, BCACP  Gretchen Brady,[facility]  [phone]  August 11, 2017

## 2017-08-09 NOTE — TELEPHONE ENCOUNTER
Left message with patient's granddaughter for patient to return call to discuss need for iron infusion. Also reached out to Yesy Samaniego to ensure enrollment in Anemia Management Services.    Arlet Webb, RN, BSN  Nephrology Care Coordinator  [facility]

## 2017-08-09 NOTE — TELEPHONE ENCOUNTER
Anemia Management Note - Enrollment  SUBJECTIVE/OBJECTIVE:  Patient called today for enrollment in Anemia Management Service.      Referred by Dr. Vibha Barfield on 7/18/2017  Primary Diagnosis: Anemia in Chronic Kidney Disease (N18.4, D63.1)     Secondary Diagnosis:  Chronic Kidney Disease, Stage 4 (N18.4)   Hgb goal range:  9-10  Epo/Darbo: None  Iron regimen:  Ferrous Sulfate once daily  Labs exp: 8/8/18    Anemia Latest Ref Rng & Units 6/21/2017 6/28/2017 7/6/2017 7/7/2017 7/9/2017 7/10/2017 7/17/2017   Hemoglobin 11.7 - 15.7 g/dL 9.0(L) 8.2(L) 8.1(L) 7.8(L) 8.0(L) 8.2(L) 7.5(L)   TSAT 15 - 46 % 17 - - - - - 16   Ferritin 8 - 252 ng/mL 196 - - - - - 206     BP Readings from Last 3 Encounters:   07/17/17 160/85   07/14/17 154/80   07/11/17 150/77     Wt Readings from Last 2 Encounters:   07/17/17 178 lb 6.4 oz (80.9 kg)   07/14/17 178 lb (80.7 kg)     Current Outpatient Prescriptions   Medication Sig Dispense Refill     metoprolol (LOPRESSOR) 50 MG tablet Take 1 tablet (50 mg) by mouth 2 times daily 180 tablet 3     sodium bicarbonate 650 MG tablet Take 1 tablet (650 mg) by mouth 3 times daily 90 tablet 0     calcium acetate (PHOSLO) 667 MG CAPS capsule Take 2 capsules (1,334 mg) by mouth 3 times daily (with meals) 180 capsule 0     furosemide (LASIX) 40 MG tablet Take 1 tablet (40 mg) by mouth daily 60 tablet 0     pravastatin (PRAVACHOL) 40 MG tablet Take 1 tablet (40 mg) by mouth daily 90 tablet 3     acetaminophen (TYLENOL) 325 MG tablet Take 2 tablets (650 mg) by mouth every 4 hours as needed for mild pain 100 tablet      nitroglycerin (NITROSTAT) 0.4 MG sublingual tablet Place 1 tablet (0.4 mg) under the tongue every 5 minutes as needed for chest pain 25 tablet 0     gabapentin (NEURONTIN) 300 MG capsule Take 1 capsule (300 mg) by mouth At Bedtime (Patient taking differently: Take 300 mg by mouth 2 times daily )       pramipexole (MIRAPEX) 0.5 MG tablet Take 3 tablets (1.5 mg) by mouth every evening        ferrous sulfate (IRON) 325 (65 FE) MG tablet Take 1 tablet (325 mg) by mouth daily (with breakfast) 100 tablet      calcium carbonate-vitamin D 500-400 MG-UNIT TABS per tablet Take 1 tablet by mouth daily       cyanocobalamin (VITAMIN B12) 1000 MCG/ML injection Inject 1 mL (1,000 mcg) into the muscle every 30 days 1 mL 11     ORDER FOR DME Equipment being ordered: cpap machine, mask, humidifier, tubing and filters 1 Device 0     ASSESSMENT:  Hgb Not at goal   Ferritin: At goal (>100ng/mL)  TSat: not at goal of >30%  Iron regimen recommended: Injectafer 750mg weekly x 2 doses    PLAN:  Discussed:  anemia overview, monitoring service and goal hemoglobin range and rationale and risks of SUELLEN blood clots, stroke and increase in blood pressure    Entered therapy plan for Injectafer 750mg weekly x 2 doses  She will call to schedule    Iron labs due:  4 weeks after IV iron    Next call date:  8/17 to document 1 of 2 Injectafer and make sure she knows to schedule 2nd dose    New patient letter with medication guide was sent to patient.  Patient verbalized understanding of the plan.     Anemia Management Service  Yesy Samaniego,HazelD and Gretchen Brady CPhT  Phone: [phone]  Fax: [phone]

## 2017-08-10 ENCOUNTER — HOSPITAL ENCOUNTER (OUTPATIENT)
Dept: CARDIOLOGY | Facility: CLINIC | Age: 77
Discharge: HOME | End: 2017-08-10
Attending: INTERNAL MEDICINE | Admitting: INTERNAL MEDICINE
Payer: MEDICARE

## 2017-08-10 ENCOUNTER — OFFICE VISIT (OUTPATIENT)
Dept: CARDIOLOGY | Facility: CLINIC | Age: 77
End: 2017-08-10
Payer: COMMERCIAL

## 2017-08-10 VITALS
HEIGHT: 64 IN | DIASTOLIC BLOOD PRESSURE: 62 MMHG | SYSTOLIC BLOOD PRESSURE: 140 MMHG | BODY MASS INDEX: 31.79 KG/M2 | OXYGEN SATURATION: 98 % | HEART RATE: 56 BPM | WEIGHT: 186.2 LBS

## 2017-08-10 DIAGNOSIS — I48.92 ATRIAL FLUTTER, UNSPECIFIED TYPE (H): ICD-10-CM

## 2017-08-10 DIAGNOSIS — I10 BENIGN ESSENTIAL HYPERTENSION: ICD-10-CM

## 2017-08-10 DIAGNOSIS — I25.10 CORONARY ARTERY DISEASE INVOLVING NATIVE CORONARY ARTERY OF NATIVE HEART WITHOUT ANGINA PECTORIS: Primary | ICD-10-CM

## 2017-08-10 DIAGNOSIS — I48.3 TYPICAL ATRIAL FLUTTER (H): ICD-10-CM

## 2017-08-10 DIAGNOSIS — I48.91 ATRIAL FIBRILLATION WITH RAPID VENTRICULAR RESPONSE (H): ICD-10-CM

## 2017-08-10 PROCEDURE — 93000 ELECTROCARDIOGRAM COMPLETE: CPT | Performed by: INTERNAL MEDICINE

## 2017-08-10 PROCEDURE — 93005 ELECTROCARDIOGRAM TRACING: CPT

## 2017-08-10 PROCEDURE — 99214 OFFICE O/P EST MOD 30 MIN: CPT | Mod: 25 | Performed by: INTERNAL MEDICINE

## 2017-08-10 RX ORDER — AMLODIPINE BESYLATE 5 MG/1
10 TABLET ORAL DAILY
Qty: 30 TABLET | Refills: 3 | Status: ON HOLD | OUTPATIENT
Start: 2017-08-10 | End: 2017-08-18

## 2017-08-10 RX ORDER — METOPROLOL TARTRATE 50 MG
25 TABLET ORAL 2 TIMES DAILY
Qty: 180 TABLET | Refills: 3 | Status: ON HOLD | OUTPATIENT
Start: 2017-08-10 | End: 2017-08-18

## 2017-08-10 NOTE — PROGRESS NOTES
July 17, 2017    CC: CKD    HPI: Kathryn Banks is a 77 year old female who presents for follow-up of CKD. Her PMH includes Type 2 DM, CAD with CABG x 5 , chronic atrial fibrillation, HTN, restless leg syndrome, pulmonary HTN, diastolic heart failure , anemia, CKD Stage 4 with baseline serum creatinine 2.4-2.8. Ms. Banks has had multiple MARTÍN episodes in the past with CKD since ~2013.  CKD likely related to DM, HTN, renovascular disease and from previous MARTÍN episodes. No diabetic retinopathy documented but patient have not had an eye exam recently. MARTÍN episode noted during hospitalization from 5/31/2017 with serum Cr peaking to 3 most likely a combination of cardiorenal syndrome and from hemodynamic variations in the setting of atrial fibrillation with RVR. No evidence of structural kidney disease in recent US kidneys.  Upon discharge from hospital on 6/6/2017 serum Cr was  2.76 . Underwent ablation procedure for atrial fibrillation on 6/7/2017. Had non compliance to diuretics and later re hospitalzation on 6/12/17 with weight gain.  On admission serum Cr was 2.49 . IV diuresis resumed but noted to have rising serum Cr to 4 on discharge on 6/16/2017 and referred to Nephrology clinic.     At the time of her last appointment we hospitalized her given her ongoing acute kidney injury. Her creatinine peaked at 4.75 most recently on July 8 but is 3.06 today. Virtually she had another hospitalization from July 6 to July 11 for volume overloaded state and also found to have an elevated INR. This presentation is different than when we hospitalized her last month as we felt she was dehydrated at that time. Her weight at the time of her last visit was 160-165 pounds where is now it is 177-178. Volume is very difficult for her as she always has some diarrhea. For example she had one stool are 80 today and had 1-2 loose stools yesterday. At this time she reports that her breathing overall is good has only a little bit of  swelling. She denies any black or bloody stools and is currently taking iron daily. She has a home health nurse visiting her 3 times a week at this point. Her blood pressure is elevated however she has not yet taken her medications today.        Allergies   Allergen Reactions     Ciprofloxacin Nausea and Vomiting     Zofran did not help     Oxycodone Visual Disturbance     Delusions, blackouts      Lisinopril Cough     Penicillins Rash     Sulfa Drugs GI Disturbance     LOSS OF TASTE         Current Outpatient Prescriptions on File Prior to Visit:  calcium acetate (PHOSLO) 667 MG CAPS capsule Take 2 capsules (1,334 mg) by mouth 3 times daily (with meals)   furosemide (LASIX) 40 MG tablet Take 1 tablet (40 mg) by mouth daily   pravastatin (PRAVACHOL) 40 MG tablet Take 1 tablet (40 mg) by mouth daily   acetaminophen (TYLENOL) 325 MG tablet Take 2 tablets (650 mg) by mouth every 4 hours as needed for mild pain   nitroglycerin (NITROSTAT) 0.4 MG sublingual tablet Place 1 tablet (0.4 mg) under the tongue every 5 minutes as needed for chest pain   gabapentin (NEURONTIN) 300 MG capsule Take 1 capsule (300 mg) by mouth At Bedtime (Patient taking differently: Take 300 mg by mouth 2 times daily )   pramipexole (MIRAPEX) 0.5 MG tablet Take 3 tablets (1.5 mg) by mouth every evening   ferrous sulfate (IRON) 325 (65 FE) MG tablet Take 1 tablet (325 mg) by mouth daily (with breakfast)   calcium carbonate-vitamin D 500-400 MG-UNIT TABS per tablet Take 1 tablet by mouth daily   cyanocobalamin (VITAMIN B12) 1000 MCG/ML injection Inject 1 mL (1,000 mcg) into the muscle every 30 days   ORDER FOR DME Equipment being ordered: cpap machine, mask, humidifier, tubing and filters     No current facility-administered medications on file prior to visit.     Past Medical History:   Diagnosis Date     Carpal tunnel syndrome      Coronary atherosclerosis of native coronary artery 2006    5 vessel CABG     OBSTRUCTIVE SLEEP APNEA     using CPAP      Osteoarthrosis, unspecified whether generalized or localized, unspecified site     generalized arthritis, particularly in her hands and feet         Other and combined forms of senile cataract 2000    Bilateral     Other and unspecified hyperlipidemia      RESTLESS LEGS SYNDROME      TENOSYNOV HAND/WRIST NEC 6/30/2006     Type II or unspecified type diabetes mellitus without mention of complication, not stated as uncontrolled 2001    Diabetes mellitus/pt is diet controlled with weight loss     Typical atrial flutter (H) 01/17/2007     Unspecified essential hypertension        Past Surgical History:   Procedure Laterality Date     ANGIOPLASTY       C APPENDECTOMY       C CABG, VEIN, FIVE  12/06    SVG x 4 and LIMA to LAD     C SOTO W/O FACETEC FORAMOT/DSKC 1/2 VRT SEG, LUMBAR  1968     C REMV CATARACT INTRACAP,INSERT LENS      Bilateral     C TOTAL ABDOM HYSTERECTOMY  1995     - fibroids     C VAGOTOMY/PYLOROPLASTY,SELECT  1970     COLONOSCOPY  12/3/2007     COLONOSCOPY  1/17/2014    Procedure: COLONOSCOPY;  Colonoscopy;  Surgeon: Zack Stearns MD;  Location:  GI     CYSTOSCOPY  11/25/09    [facility]     ENDOSCOPY  03/21/2000    Upper GI     HC COLONOSCOPY THRU STOMA, DIAGNOSTIC  10/7/04    poor prep, repeat in 2-3 years     HC COLONOSCOPY W/WO BRUSH/WASH  10/31/07    Repeat-poor quality prep     HC REMOVAL OF OVARY/TUBE(S)      Salpingo-Oophorectomy, Unilateral     HC REVISE MEDIAN N/CARPAL TUNNEL SURG      Carpal tunnel release     HC UGI ENDOSCOPY DIAG W BIOPSY  10/31/07     HC UGI ENDOSCOPY, SIMPLE EXAM  12/3/2007     OPEN REDUCTION INTERNAL FIXATION HIP NAILING Left 7/3/2016    Procedure: OPEN REDUCTION INTERNAL FIXATION HIP NAILING;  Surgeon: Lake Schulz MD;  Location:  OR       Social History   Substance Use Topics     Smoking status: Former Smoker     Years: 10.00     Quit date: 1/1/1969     Smokeless tobacco: Never Used     Alcohol use 0.0 oz/week     0 Standard drinks or equivalent per  week      Comment: occasional- 2 drinks per month       Family History   Problem Relation Age of Onset     DIABETES Father      AODM     Neurologic Disorder Father      Parkinson's     Blood Disease Father       from blood clot from leg to lung immediately after hip fracture     DIABETES Paternal Grandmother      adult onset     DIABETES Maternal Grandmother      adult onset     Hypertension No family hx of      CEREBROVASCULAR DISEASE No family hx of        ROS: A 4 system review of systems was negative other than noted here or above.     Exam:  /85 (BP Location: Right arm, Patient Position: Chair, Cuff Size: Adult Regular)  Pulse 76  Temp 98.2  F (36.8  C) (Oral)  Wt 80.9 kg (178 lb 6.4 oz)  SpO2 98%  BMI 30.62 kg/m2    GENERAL APPEARANCE: alert and no distress  RESP: lungs clear to auscultation   CV: regular rhythm, normal rate, no rub  Extremities: no clubbing, cyanosis, mild edema  SKIN: no rash  NEURO: mentation intact and speech normal  PSYCH: affect normal/bright    Results:    Office Visit on 2017   Component Date Value Ref Range Status     Iron 2017 23* 35 - 180 ug/dL Final     Iron Binding Cap 2017 145* 240 - 430 ug/dL Final     Iron Saturation Index 2017 16  15 - 46 % Final     Ferritin 2017 206  8 - 252 ng/mL Final   Orders Only on 2017   Component Date Value Ref Range Status     Complement C4 2017 21  15 - 50 mg/dL Final     Complement C3 2017 67* 76 - 169 mg/dL Final     Sodium 2017 146* 133 - 144 mmol/L Final     Potassium 2017 4.7  3.4 - 5.3 mmol/L Final     Chloride 2017 117* 94 - 109 mmol/L Final     Carbon Dioxide 2017 21  20 - 32 mmol/L Final     Anion Gap 2017 8  3 - 14 mmol/L Final     Glucose 2017 133* 70 - 99 mg/dL Final    Non Fasting     Urea Nitrogen 2017 37* 7 - 30 mg/dL Final     Creatinine 2017 3.06* 0.52 - 1.04 mg/dL Final     GFR Estimate 2017 15* >60 mL/min/1.7m2  Final    Non  GFR Calc     GFR Estimate If Black 07/17/2017 18* >60 mL/min/1.7m2 Final    African American GFR Calc     Calcium 07/17/2017 6.9* 8.5 - 10.1 mg/dL Final     Phosphorus 07/17/2017 3.2  2.5 - 4.5 mg/dL Final     Albumin 07/17/2017 2.5* 3.4 - 5.0 g/dL Final     Parathyroid Hormone Intact 07/17/2017 726* 12 - 72 pg/mL Final     Protein Random Urine 07/17/2017 0.28  g/L Final     Protein Total Urine g/gr Creatinine 07/17/2017 1.29* 0 - 0.2 g/g Cr Final     Hemoglobin 07/17/2017 7.5* 11.7 - 15.7 g/dL Final    Comment: Critical Value called to and read back by  RUEL IN MED SPEC. OM 7/17/2017 AT 1111 BY SG       INR 07/17/2017 1.06  0.86 - 1.14 Final     Creatinine Urine 07/17/2017 22  mg/dL Final       Assessment/Plan:   1. MARTÍN on CKD Stage 3:  - CKD: risk factors for CKD include diabetes, hypertension, previous MARTÍN events, and the potential of hemodynamic variability in the setting of her cardiac disease (potential cardiorenal).   - Most recent MARTÍN occurred in the setting of heart failure/aggressive diuresis.   - Because she has had some hematuria in the past serologic tests were done. Her C3 was mildly low with a normal C4. Anti-GBM and vasculitis testing were negative.  - The plan at this time will be to follow her kidney function closely. We will plan to have her back in one month.    2. Hypertension: Blood pressure is uncontrolled today however she has not yet taken her medications for the day. I've asked her to follow her blood pressures at home daily for now and update if she finds that the air consistently greater than 140/90.    3. Mild hypernatremia - water depleted in the setting of diuresis. We will need to balance her diuresis closely with hopes of avoiding volume depleted state.    4. Metabolic acidosis:  Her bicarbonate level is currently 21. She is currently taking sodium bicarbonate 650 mg t.i.d.    5. Anemia: hemoglobin Is 7.5 with a low iron level - will enroll her in  anemia services.     6. Volume: continue current diuretic dose. Follow weights closely.     Patient Instructions   1. Repeat labs again on Monday  2. Follow blood pressure at home daily for now. Please update if you find that it is consistently greater than 140/90. Arlet Webb -[phone]  3. Continue to follow weights as you have been.  4. Plan a one month follow-up or sooner as needed.   5. You will be getting contact from Yesy Villa from anemia services regarding more details on iron         Vibha Barfield, DO

## 2017-08-10 NOTE — LETTER
8/10/2017    Dheeraj Jj MD  [facility]   [address] [place] [address]    RE: Kathryn Centenomeredith       Dear Colleague,    I had the pleasure of seeing Kathryn Banks in the [facility].    I saw Ms. Banks for followup of atrial flutter ablation.  I saw her on 05/18 and recommended atrial flutter ablation which was performed on 06/07 successfully.  Since the ablation, she has been in sinus rhythm with no atrial fibrillation.  Symptomatically, she has some mild fatigue, but no palpitations or chest pain.      PHYSICAL EXAMINATION:  Blood pressure was 140/62, heart rate 56 beats per minute and body weight 186 pounds.  She is on oxygen for her severe COPD.  The cardiac rhythm was regular, and the EKG confirmed sinus rhythm today.     Outpatient Encounter Prescriptions as of 8/10/2017   Medication Sig Dispense Refill     metoprolol (LOPRESSOR) 50 MG tablet Take 0.5 tablets (25 mg) by mouth 2 times daily 180 tablet 3     aspirin 81 MG tablet Take 1 tablet (81 mg) by mouth daily 30 tablet 3     amLODIPine (NORVASC) 5 MG tablet Take 2 tablets (10 mg) by mouth daily 30 tablet 3     sodium bicarbonate 650 MG tablet Take 1 tablet (650 mg) by mouth 3 times daily 90 tablet 0     calcium acetate (PHOSLO) 667 MG CAPS capsule Take 2 capsules (1,334 mg) by mouth 3 times daily (with meals) 180 capsule 0     furosemide (LASIX) 40 MG tablet Take 1 tablet (40 mg) by mouth daily 60 tablet 0     pravastatin (PRAVACHOL) 40 MG tablet Take 1 tablet (40 mg) by mouth daily 90 tablet 3     acetaminophen (TYLENOL) 325 MG tablet Take 2 tablets (650 mg) by mouth every 4 hours as needed for mild pain 100 tablet      gabapentin (NEURONTIN) 300 MG capsule Take 1 capsule (300 mg) by mouth At Bedtime (Patient taking differently: Take 300 mg by mouth 2 times daily )       pramipexole (MIRAPEX) 0.5 MG tablet Take 3 tablets (1.5 mg) by mouth every evening       ferrous sulfate (IRON) 325 (65 FE)  MG tablet Take 1 tablet (325 mg) by mouth daily (with breakfast) 100 tablet      calcium carbonate-vitamin D 500-400 MG-UNIT TABS per tablet Take 1 tablet by mouth daily       cyanocobalamin (VITAMIN B12) 1000 MCG/ML injection Inject 1 mL (1,000 mcg) into the muscle every 30 days 1 mL 11     [DISCONTINUED] metoprolol (LOPRESSOR) 50 MG tablet Take 1 tablet (50 mg) by mouth 2 times daily 180 tablet 3     nitroglycerin (NITROSTAT) 0.4 MG sublingual tablet Place 1 tablet (0.4 mg) under the tongue every 5 minutes as needed for chest pain (Patient not taking: Reported on 8/10/2017) 25 tablet 0     ORDER FOR DME Equipment being ordered: cpap machine, mask, humidifier, tubing and filters 1 Device 0     No facility-administered encounter medications on file as of 8/10/2017.       ASSESSMENT AND RECOMMENDATIONS:  Ms. Banks is in sinus rhythm with no atrial fibrillation.  I agree for her to switch from warfarin to aspirin 81 mg once a day.  The patient will be monitored for possible recurrence of atrial fibrillation in the future.  She will continue cardiology followup in the [facility] in a year.  I have reduced the dose of metoprolol from 50 mg to 25 mg p.o. b.i.d. because of her complaint of fatigue and mild sinus bradycardia.  For blood pressure control, I have added amlodipine 10 mg once a day.     Again, thank you for allowing me to participate in the care of your patient.      Sincerely,    Thaddeus King MD     [facility]

## 2017-08-10 NOTE — LETTER
8/10/2017      RE: Kathryn Banks  [address] [address]       Dear Colleague,    Thank you for the opportunity to participate in the care of your patient, Kathryn Banks, at the [facility]. Please see a copy of my visit note below.    HPI and Plan:   See dictation    Orders Placed This Encounter   Procedures     EKG 12-LEAD CLINIC READ       Orders Placed This Encounter   Medications     metoprolol (LOPRESSOR) 50 MG tablet     Sig: Take 0.5 tablets (25 mg) by mouth 2 times daily     Dispense:  180 tablet     Refill:  3     aspirin 81 MG tablet     Sig: Take 1 tablet (81 mg) by mouth daily     Dispense:  30 tablet     Refill:  3     amLODIPine (NORVASC) 5 MG tablet     Sig: Take 2 tablets (10 mg) by mouth daily     Dispense:  30 tablet     Refill:  3       Medications Discontinued During This Encounter   Medication Reason     metoprolol (LOPRESSOR) 50 MG tablet Reorder         Encounter Diagnoses   Name Primary?     Atrial flutter, unspecified type (H)      Atrial fibrillation with rapid ventricular response (H)      Typical atrial flutter (H)      Coronary artery disease involving native coronary artery of native heart without angina pectoris Yes     Benign essential hypertension        CURRENT MEDICATIONS:  Current Outpatient Prescriptions   Medication Sig Dispense Refill     metoprolol (LOPRESSOR) 50 MG tablet Take 0.5 tablets (25 mg) by mouth 2 times daily 180 tablet 3     aspirin 81 MG tablet Take 1 tablet (81 mg) by mouth daily 30 tablet 3     amLODIPine (NORVASC) 5 MG tablet Take 2 tablets (10 mg) by mouth daily 30 tablet 3     sodium bicarbonate 650 MG tablet Take 1 tablet (650 mg) by mouth 3 times daily 90 tablet 0     calcium acetate (PHOSLO) 667 MG CAPS capsule Take 2 capsules (1,334 mg) by mouth 3 times daily (with meals) 180 capsule 0     furosemide (LASIX) 40 MG tablet Take 1 tablet (40 mg) by mouth daily 60 tablet 0      pravastatin (PRAVACHOL) 40 MG tablet Take 1 tablet (40 mg) by mouth daily 90 tablet 3     acetaminophen (TYLENOL) 325 MG tablet Take 2 tablets (650 mg) by mouth every 4 hours as needed for mild pain 100 tablet      gabapentin (NEURONTIN) 300 MG capsule Take 1 capsule (300 mg) by mouth At Bedtime (Patient taking differently: Take 300 mg by mouth 2 times daily )       pramipexole (MIRAPEX) 0.5 MG tablet Take 3 tablets (1.5 mg) by mouth every evening       ferrous sulfate (IRON) 325 (65 FE) MG tablet Take 1 tablet (325 mg) by mouth daily (with breakfast) 100 tablet      calcium carbonate-vitamin D 500-400 MG-UNIT TABS per tablet Take 1 tablet by mouth daily       cyanocobalamin (VITAMIN B12) 1000 MCG/ML injection Inject 1 mL (1,000 mcg) into the muscle every 30 days 1 mL 11     [DISCONTINUED] metoprolol (LOPRESSOR) 50 MG tablet Take 1 tablet (50 mg) by mouth 2 times daily 180 tablet 3     nitroglycerin (NITROSTAT) 0.4 MG sublingual tablet Place 1 tablet (0.4 mg) under the tongue every 5 minutes as needed for chest pain (Patient not taking: Reported on 8/10/2017) 25 tablet 0     ORDER FOR DME Equipment being ordered: cpap machine, mask, humidifier, tubing and filters 1 Device 0       ALLERGIES     Allergies   Allergen Reactions     Ciprofloxacin Nausea and Vomiting     Zofran did not help     Oxycodone Visual Disturbance     Delusions, blackouts      Lisinopril Cough     Penicillins Rash     Sulfa Drugs GI Disturbance     LOSS OF TASTE       PAST MEDICAL HISTORY:  Past Medical History:   Diagnosis Date     Carpal tunnel syndrome      Coronary atherosclerosis of native coronary artery 2006    5 vessel CABG     OBSTRUCTIVE SLEEP APNEA     using CPAP     Osteoarthrosis, unspecified whether generalized or localized, unspecified site     generalized arthritis, particularly in her hands and feet         Other and combined forms of senile cataract 2000    Bilateral     Other and unspecified hyperlipidemia      RESTLESS  LEGS SYNDROME      TENOSYNOV HAND/WRIST NEC 2006     Type II or unspecified type diabetes mellitus without mention of complication, not stated as uncontrolled     Diabetes mellitus/pt is diet controlled with weight loss     Typical atrial flutter (H) 2007    ablation 2017     Unspecified essential hypertension        PAST SURGICAL HISTORY:  Past Surgical History:   Procedure Laterality Date     ANGIOPLASTY       C APPENDECTOMY       C CABG, VEIN, FIVE      SVG x 4 and LIMA to LAD     C SOTO W/O FACETEC FORAMOT/DSKC  VRT SEG, LUMBAR       C REMV CATARACT INTRACAP,INSERT LENS      Bilateral     C TOTAL ABDOM HYSTERECTOMY       - fibroids     C VAGOTOMY/PYLOROPLASTY,SELECT  1970     COLONOSCOPY  12/3/2007     COLONOSCOPY  2014    Procedure: COLONOSCOPY;  Colonoscopy;  Surgeon: Zack Stearns MD;  Location:  GI     CYSTOSCOPY  09    [facility]     ENDOSCOPY  2000    Upper GI     HC COLONOSCOPY THRU STOMA, DIAGNOSTIC  10/7/04    poor prep, repeat in 2-3 years     HC COLONOSCOPY W/WO BRUSH/WASH  10/31/07    Repeat-poor quality prep     HC REMOVAL OF OVARY/TUBE(S)      Salpingo-Oophorectomy, Unilateral     HC REVISE MEDIAN N/CARPAL TUNNEL SURG      Carpal tunnel release     HC UGI ENDOSCOPY DIAG W BIOPSY  10/31/07     HC UGI ENDOSCOPY, SIMPLE EXAM  12/3/2007     OPEN REDUCTION INTERNAL FIXATION HIP NAILING Left 7/3/2016    Procedure: OPEN REDUCTION INTERNAL FIXATION HIP NAILING;  Surgeon: Lake Schulz MD;  Location:  OR       FAMILY HISTORY:  Family History   Problem Relation Age of Onset     DIABETES Father      AODM     Neurologic Disorder Father      Parkinson's     Blood Disease Father       from blood clot from leg to lung immediately after hip fracture     DIABETES Paternal Grandmother      adult onset     DIABETES Maternal Grandmother      adult onset     Hypertension No family hx of      CEREBROVASCULAR DISEASE No family hx of        SOCIAL  "HISTORY:  Social History     Social History     Marital status:      Spouse name: Urbano Banks     Number of children: 2     Years of education: 13     Occupational History     Ministry coordinator      [employer]     Social History Main Topics     Smoking status: Former Smoker     Years: 10.00     Quit date: 1/1/1969     Smokeless tobacco: Never Used     Alcohol use 0.0 oz/week     0 Standard drinks or equivalent per week      Comment: occasional- 2 drinks per month     Drug use: No     Sexual activity: Yes     Birth control/ protection: Surgical     Other Topics Concern     Parent/Sibling W/ Cabg, Mi Or Angioplasty Before 65f 55m? No     Social History Narrative       Review of Systems:  Skin:  Negative       Eyes:  Positive for glasses    ENT:  Negative      Respiratory:  Positive for shortness of breath;dyspnea on exertion     Cardiovascular:  Negative for;palpitations;chest pain;lightheadedness;dizziness Positive for;edema;fatigue;exercise intolerance lots of edema reagan LE, open wounds, usually has them wrapped   Gastroenterology: Negative      Genitourinary:  Negative      Musculoskeletal:  Negative      Neurologic:  Positive for numbness or tingling of feet neuropathy in feet   Psychiatric:  Positive for sleep disturbances not much sleep   Heme/Lymph/Imm:  Positive for allergies    Endocrine:  Positive for diabetes      Physical Exam:  Vitals: /62 (BP Location: Right arm, Patient Position: Fowlers, Cuff Size: Adult Large)  Pulse 56  Ht 1.626 m (5' 4\")  Wt 84.5 kg (186 lb 3.2 oz)  SpO2 98%  BMI 31.96 kg/m2    Constitutional:  cooperative        Skin:  warm and dry to the touch        Head:  normocephalic        Eyes:  no xanthalasma        ENT:  no pallor or cyanosis        Neck:  carotid pulses are full and equal bilaterally, JVP normal, no carotid bruit, no thyromegaly        Chest:  normal breath sounds, clear to auscultation, normal A-P diameter, normal " symmetry, normal respiratory excursion, no use of accessory muscles          Cardiac: regular rhythm;normal S1 and S2;no S3 or S4;no murmurs, gallops or rubs detected tachycardic                Abdomen:  abdomen soft, non-tender, BS normoactive, no mass, no HSM, no bruits        Vascular: pulses full and equal                                        Extremities and Back:      trace;bilateral LE edema          Neurological:  affect appropriate, oriented to time, person and place              HISTORY OF PRESENT ILLNESS:  I saw Ms. Banks for followup of atrial flutter ablation.  I saw her on 05/18 and recommended atrial flutter ablation which was performed on 06/07 successfully.  Since the ablation, she has been in sinus rhythm with no atrial fibrillation.  Symptomatically, she has some mild fatigue, but no palpitations or chest pain.      PHYSICAL EXAMINATION:  Blood pressure was 140/62, heart rate 56 beats per minute and body weight 186 pounds.  She is on oxygen for her severe COPD.  The cardiac rhythm was regular, and the EKG confirmed sinus rhythm today.      ASSESSMENT AND RECOMMENDATIONS:  Ms. Bansk is in sinus rhythm with no atrial fibrillation.  I agree for her to switch from warfarin to aspirin 81 mg once a day.  The patient will be monitored for possible recurrence of atrial fibrillation in the future.  She will continue cardiology followup in the [facility] in a year.  I have reduced the dose of metoprolol from 50 mg to 25 mg p.o. b.i.d. because of her complaint of fatigue and mild sinus bradycardia.  For blood pressure control, I have added amlodipine 10 mg once a day.      Thaddeus King MD    CC:      Dheeraj Jj MD   [id number] [place] [address]      Rajinder Betancourt MD  [address] [place] [id number]

## 2017-08-10 NOTE — PROGRESS NOTES
[facility] now requests orders and shares plan of care/discharge summaries for some patients through [facility].  Please REPLY TO THIS MESSAGE in order to give authorization for orders when needed.  This is considered a verbal order, you will still receive a faxed copy of orders for signature.  Thank you for your assistance in improving collaboration for our patients.    ORDER    MD SUMMARY/PLAN OF CARE    Update on patient LE;  Pt continues to have 1 plus pitting edema in bilateral LE. Weight is stable at 181 pounds this week, no increased SOB. Venous stasis ulcers to legs have increased in size and number. Ulcers are constantly weeping and pt is soaking through ABD pads and Kerlix. Legs do not appear to be infected at this time. Pt does c/o pain to legs when they are open to air, reports stinging and burning feeling. Consulted with [facility] nurse and these were his findings;    Wounds on Left lower leg venous stasis ulcers, four open areas on the anterior lower leg and two on the medial lower leg.   Wounds on Right lower leg venous stasis ulcers, pt has approximately fifteen small to moderate sized open areas scattered over the entire anterior lower leg with a large cluster midtibial.    All the open areas on both lower legs are similar in appearance as is the periwound skin and LE edema and erythema.  The wounds are all shallow but full thickness wounds with slough, nonviable white tissue and red nongranular tissue within.  Wound edges are all open with some of the smaller wounds being flush portions of the surrounding skin.  Periwound skin immediately around most of the wounds is hypopigmented and some are soft to touch.  Periwound skin over both lower legs is very moist and noted for erythema with no increased warmth to touch.  Today after cleansing with saline, applied Sween 24 on all the periwound skin up to the wound edges, and on all the intact skin on lower legs and feet.  Then applied pieces of  Calcium alginate over the open wounds.  Covered with ABD pads secured with roll cloth from profore kits to secure and provide extra absorbency like gauze, secured with tape. Pt will apply Ace wraps after our visit with assist by PCA.  Will recommend to MD use of Unnas boots on lower legs and feet, cover areas of drainage with absorptive pads, ontop of the Unnas boot, like optilock, regular foam, alginate or ABD pads depending on level of drainage. secure with roll gauze and then compression with ace wraps or short stretch wraps.  Will order supplies from [email or web address] and get orders from MD for new plan of care.    Pt has been taking her medications as ordered and is using a med machine. INR was 3.8 on 8/4 and again today, will check again on 8/11.      Please let me know if you have questions or further orders at this time.    Thank You - Josephine Sethi RN    [phone]

## 2017-08-10 NOTE — PROGRESS NOTES
HPI and Plan:   See dictation    Orders Placed This Encounter   Procedures     EKG 12-LEAD CLINIC READ       Orders Placed This Encounter   Medications     metoprolol (LOPRESSOR) 50 MG tablet     Sig: Take 0.5 tablets (25 mg) by mouth 2 times daily     Dispense:  180 tablet     Refill:  3     aspirin 81 MG tablet     Sig: Take 1 tablet (81 mg) by mouth daily     Dispense:  30 tablet     Refill:  3     amLODIPine (NORVASC) 5 MG tablet     Sig: Take 2 tablets (10 mg) by mouth daily     Dispense:  30 tablet     Refill:  3       Medications Discontinued During This Encounter   Medication Reason     metoprolol (LOPRESSOR) 50 MG tablet Reorder         Encounter Diagnoses   Name Primary?     Atrial flutter, unspecified type (H)      Atrial fibrillation with rapid ventricular response (H)      Typical atrial flutter (H)      Coronary artery disease involving native coronary artery of native heart without angina pectoris Yes     Benign essential hypertension        CURRENT MEDICATIONS:  Current Outpatient Prescriptions   Medication Sig Dispense Refill     metoprolol (LOPRESSOR) 50 MG tablet Take 0.5 tablets (25 mg) by mouth 2 times daily 180 tablet 3     aspirin 81 MG tablet Take 1 tablet (81 mg) by mouth daily 30 tablet 3     amLODIPine (NORVASC) 5 MG tablet Take 2 tablets (10 mg) by mouth daily 30 tablet 3     sodium bicarbonate 650 MG tablet Take 1 tablet (650 mg) by mouth 3 times daily 90 tablet 0     calcium acetate (PHOSLO) 667 MG CAPS capsule Take 2 capsules (1,334 mg) by mouth 3 times daily (with meals) 180 capsule 0     furosemide (LASIX) 40 MG tablet Take 1 tablet (40 mg) by mouth daily 60 tablet 0     pravastatin (PRAVACHOL) 40 MG tablet Take 1 tablet (40 mg) by mouth daily 90 tablet 3     acetaminophen (TYLENOL) 325 MG tablet Take 2 tablets (650 mg) by mouth every 4 hours as needed for mild pain 100 tablet      gabapentin (NEURONTIN) 300 MG capsule Take 1 capsule (300 mg) by mouth At Bedtime (Patient taking  differently: Take 300 mg by mouth 2 times daily )       pramipexole (MIRAPEX) 0.5 MG tablet Take 3 tablets (1.5 mg) by mouth every evening       ferrous sulfate (IRON) 325 (65 FE) MG tablet Take 1 tablet (325 mg) by mouth daily (with breakfast) 100 tablet      calcium carbonate-vitamin D 500-400 MG-UNIT TABS per tablet Take 1 tablet by mouth daily       cyanocobalamin (VITAMIN B12) 1000 MCG/ML injection Inject 1 mL (1,000 mcg) into the muscle every 30 days 1 mL 11     [DISCONTINUED] metoprolol (LOPRESSOR) 50 MG tablet Take 1 tablet (50 mg) by mouth 2 times daily 180 tablet 3     nitroglycerin (NITROSTAT) 0.4 MG sublingual tablet Place 1 tablet (0.4 mg) under the tongue every 5 minutes as needed for chest pain (Patient not taking: Reported on 8/10/2017) 25 tablet 0     ORDER FOR DME Equipment being ordered: cpap machine, mask, humidifier, tubing and filters 1 Device 0       ALLERGIES     Allergies   Allergen Reactions     Ciprofloxacin Nausea and Vomiting     Zofran did not help     Oxycodone Visual Disturbance     Delusions, blackouts      Lisinopril Cough     Penicillins Rash     Sulfa Drugs GI Disturbance     LOSS OF TASTE       PAST MEDICAL HISTORY:  Past Medical History:   Diagnosis Date     Carpal tunnel syndrome      Coronary atherosclerosis of native coronary artery 2006    5 vessel CABG     OBSTRUCTIVE SLEEP APNEA     using CPAP     Osteoarthrosis, unspecified whether generalized or localized, unspecified site     generalized arthritis, particularly in her hands and feet         Other and combined forms of senile cataract 2000    Bilateral     Other and unspecified hyperlipidemia      RESTLESS LEGS SYNDROME      TENOSYNOV HAND/WRIST NEC 6/30/2006     Type II or unspecified type diabetes mellitus without mention of complication, not stated as uncontrolled 2001    Diabetes mellitus/pt is diet controlled with weight loss     Typical atrial flutter (H) 01/17/2007    ablation 6/7/2017     Unspecified essential  hypertension        PAST SURGICAL HISTORY:  Past Surgical History:   Procedure Laterality Date     ANGIOPLASTY       C APPENDECTOMY       C CABG, VEIN, FIVE      SVG x 4 and LIMA to LAD     C SOTO W/O FACETEC FORAMOT/DSKC  VRT SEG, LUMBAR  1968     C REMV CATARACT INTRACAP,INSERT LENS      Bilateral     C TOTAL ABDOM HYSTERECTOMY       - fibroids     C VAGOTOMY/PYLOROPLASTY,SELECT  1970     COLONOSCOPY  12/3/2007     COLONOSCOPY  2014    Procedure: COLONOSCOPY;  Colonoscopy;  Surgeon: Zack Stearns MD;  Location:  GI     CYSTOSCOPY  09    [facility]     ENDOSCOPY  2000    Upper GI     HC COLONOSCOPY THRU STOMA, DIAGNOSTIC  10/7/04    poor prep, repeat in 2-3 years     HC COLONOSCOPY W/WO BRUSH/WASH  10/31/07    Repeat-poor quality prep     HC REMOVAL OF OVARY/TUBE(S)      Salpingo-Oophorectomy, Unilateral     HC REVISE MEDIAN N/CARPAL TUNNEL SURG      Carpal tunnel release     HC UGI ENDOSCOPY DIAG W BIOPSY  10/31/07     HC UGI ENDOSCOPY, SIMPLE EXAM  12/3/2007     OPEN REDUCTION INTERNAL FIXATION HIP NAILING Left 7/3/2016    Procedure: OPEN REDUCTION INTERNAL FIXATION HIP NAILING;  Surgeon: Lake Schulz MD;  Location:  OR       FAMILY HISTORY:  Family History   Problem Relation Age of Onset     DIABETES Father      AODM     Neurologic Disorder Father      Parkinson's     Blood Disease Father       from blood clot from leg to lung immediately after hip fracture     DIABETES Paternal Grandmother      adult onset     DIABETES Maternal Grandmother      adult onset     Hypertension No family hx of      CEREBROVASCULAR DISEASE No family hx of        SOCIAL HISTORY:  Social History     Social History     Marital status:      Spouse name: Urbano Banks     Number of children: 2     Years of education: 13     Occupational History     Ministry coordinator      [employer]     Social History Main Topics     Smoking status: Former Smoker      "Years: 10.00     Quit date: 1/1/1969     Smokeless tobacco: Never Used     Alcohol use 0.0 oz/week     0 Standard drinks or equivalent per week      Comment: occasional- 2 drinks per month     Drug use: No     Sexual activity: Yes     Birth control/ protection: Surgical     Other Topics Concern     Parent/Sibling W/ Cabg, Mi Or Angioplasty Before 65f 55m? No     Social History Narrative       Review of Systems:  Skin:  Negative       Eyes:  Positive for glasses    ENT:  Negative      Respiratory:  Positive for shortness of breath;dyspnea on exertion     Cardiovascular:  Negative for;palpitations;chest pain;lightheadedness;dizziness Positive for;edema;fatigue;exercise intolerance lots of edema reagan LE, open wounds, usually has them wrapped   Gastroenterology: Negative      Genitourinary:  Negative      Musculoskeletal:  Negative      Neurologic:  Positive for numbness or tingling of feet neuropathy in feet   Psychiatric:  Positive for sleep disturbances not much sleep   Heme/Lymph/Imm:  Positive for allergies    Endocrine:  Positive for diabetes      Physical Exam:  Vitals: /62 (BP Location: Right arm, Patient Position: Fowlers, Cuff Size: Adult Large)  Pulse 56  Ht 1.626 m (5' 4\")  Wt 84.5 kg (186 lb 3.2 oz)  SpO2 98%  BMI 31.96 kg/m2    Constitutional:  cooperative        Skin:  warm and dry to the touch        Head:  normocephalic        Eyes:  no xanthalasma        ENT:  no pallor or cyanosis        Neck:  carotid pulses are full and equal bilaterally, JVP normal, no carotid bruit, no thyromegaly        Chest:  normal breath sounds, clear to auscultation, normal A-P diameter, normal symmetry, normal respiratory excursion, no use of accessory muscles          Cardiac: regular rhythm;normal S1 and S2;no S3 or S4;no murmurs, gallops or rubs detected tachycardic                Abdomen:  abdomen soft, non-tender, BS normoactive, no mass, no HSM, no bruits        Vascular: pulses full and equal                "                         Extremities and Back:      trace;bilateral LE edema          Neurological:  affect appropriate, oriented to time, person and place              CC  Rajinder Betancourt MD  [address] [place] [id number]

## 2017-08-10 NOTE — TELEPHONE ENCOUNTER
Left message again with patient's granddaughter for patient to return call.    Dr. Barfield would like to repeat labs at this time. Patient is scheduled for injectafter.    Arlet Webb RN, BSN  Nephrology Care Coordinator  [facility]

## 2017-08-10 NOTE — PROGRESS NOTES
HISTORY OF PRESENT ILLNESS:  I saw Ms. Banks for followup of atrial flutter ablation.  I saw her on  and recommended atrial flutter ablation which was performed on  successfully.  Since the ablation, she has been in sinus rhythm with no atrial fibrillation.  Symptomatically, she has some mild fatigue, but no palpitations or chest pain.      PHYSICAL EXAMINATION:  Blood pressure was 140/62, heart rate 56 beats per minute and body weight 186 pounds.  She is on oxygen for her severe COPD.  The cardiac rhythm was regular, and the EKG confirmed sinus rhythm today.      ASSESSMENT AND RECOMMENDATIONS:  Ms. Banks is in sinus rhythm with no atrial fibrillation.  I agree for her to switch from warfarin to aspirin 81 mg once a day.  The patient will be monitored for possible recurrence of atrial fibrillation in the future.  She will continue cardiology followup in the [facility] in a year.  I have reduced the dose of metoprolol from 50 mg to 25 mg p.o. b.i.d. because of her complaint of fatigue and mild sinus bradycardia.  For blood pressure control, I have added amlodipine 10 mg once a day.      cc:      [place] [address]         SUSAN GONZÁLES MD             D: 08/10/2017 11:50   T: 08/10/2017 12:43   MT: DANIELLE      Name:     MARI BANKS   MRN:      [id number]        Account:      [id number]   :      1940           Service Date: 08/10/2017      Document: [id number]

## 2017-08-11 ENCOUNTER — TELEPHONE (OUTPATIENT)
Dept: INTERNAL MEDICINE | Facility: CLINIC | Age: 77
End: 2017-08-11

## 2017-08-11 DIAGNOSIS — M79.604 LEG PAIN, BILATERAL: Primary | ICD-10-CM

## 2017-08-11 DIAGNOSIS — M79.605 LEG PAIN, BILATERAL: Primary | ICD-10-CM

## 2017-08-11 RX ORDER — TRAMADOL HYDROCHLORIDE 50 MG/1
50-100 TABLET ORAL EVERY 6 HOURS PRN
Qty: 20 TABLET | Refills: 0 | Status: ON HOLD | OUTPATIENT
Start: 2017-08-11 | End: 2017-08-18

## 2017-08-11 NOTE — TELEPHONE ENCOUNTER
Reason for Call:  Medication or medication refill:    Do you use a [facility]?  Name of the pharmacy and phone number for the current request:  [facility] - [phone] (Fax [phone])    Name of the medication requested: pain med    Other request: Josephine calling from home care, states that the patient has open areas on her legs that are causing her pain and would like to know if there is something you can prescribe for this, Patient states that Tylenol was not affective,please call and advise     Can we leave a detailed message on this number? YES    Phone number can be reached at: Other phone number:  [phone] Josephine     Best Time: any     Call taken on 8/11/2017 at 11:32 AM by Tabitha Bland

## 2017-08-11 NOTE — PROGRESS NOTES
I worry about unna boots with the open wounds and possible infection we won't be able to see.  Has the wound nurse(Suhail) seen her?

## 2017-08-12 ENCOUNTER — HOSPITAL ENCOUNTER (INPATIENT)
Facility: CLINIC | Age: 77
LOS: 6 days | Discharge: NURSING, REHAB OR LONG-TERM CARE | DRG: 291 | End: 2017-08-18
Attending: INTERNAL MEDICINE | Admitting: INTERNAL MEDICINE
Payer: MEDICARE

## 2017-08-12 ENCOUNTER — APPOINTMENT (OUTPATIENT)
Dept: CT IMAGING | Facility: CLINIC | Age: 77
DRG: 291 | End: 2017-08-12
Attending: INTERNAL MEDICINE
Payer: MEDICARE

## 2017-08-12 ENCOUNTER — APPOINTMENT (OUTPATIENT)
Dept: GENERAL RADIOLOGY | Facility: CLINIC | Age: 77
End: 2017-08-12
Attending: FAMILY MEDICINE
Payer: MEDICARE

## 2017-08-12 ENCOUNTER — HOSPITAL ENCOUNTER (EMERGENCY)
Facility: CLINIC | Age: 77
Discharge: ANOTHER HOSPITAL | End: 2017-08-12
Attending: FAMILY MEDICINE | Admitting: FAMILY MEDICINE
Payer: MEDICARE

## 2017-08-12 VITALS
TEMPERATURE: 96.2 F | DIASTOLIC BLOOD PRESSURE: 55 MMHG | BODY MASS INDEX: 30.9 KG/M2 | HEART RATE: 49 BPM | WEIGHT: 180 LBS | RESPIRATION RATE: 18 BRPM | SYSTOLIC BLOOD PRESSURE: 96 MMHG | OXYGEN SATURATION: 98 %

## 2017-08-12 DIAGNOSIS — N17.9 ACUTE KIDNEY INJURY (NONTRAUMATIC) (H): Primary | ICD-10-CM

## 2017-08-12 DIAGNOSIS — M79.605 LEG PAIN, BILATERAL: ICD-10-CM

## 2017-08-12 DIAGNOSIS — D50.8 IRON DEFICIENCY ANEMIA SECONDARY TO INADEQUATE DIETARY IRON INTAKE: ICD-10-CM

## 2017-08-12 DIAGNOSIS — E87.5 HYPERKALEMIA: ICD-10-CM

## 2017-08-12 DIAGNOSIS — G25.81 RESTLESS LEGS SYNDROME (RLS): ICD-10-CM

## 2017-08-12 DIAGNOSIS — I10 BENIGN ESSENTIAL HYPERTENSION: ICD-10-CM

## 2017-08-12 DIAGNOSIS — I50.23 ACUTE ON CHRONIC SYSTOLIC CONGESTIVE HEART FAILURE (H): ICD-10-CM

## 2017-08-12 DIAGNOSIS — M79.604 LEG PAIN, BILATERAL: ICD-10-CM

## 2017-08-12 DIAGNOSIS — I83.009 VENOUS STASIS ULCER (H): ICD-10-CM

## 2017-08-12 DIAGNOSIS — I13.0: ICD-10-CM

## 2017-08-12 DIAGNOSIS — R35.0 URINARY FREQUENCY: ICD-10-CM

## 2017-08-12 DIAGNOSIS — N39.0 URINARY TRACT INFECTION WITHOUT HEMATURIA, SITE UNSPECIFIED: ICD-10-CM

## 2017-08-12 DIAGNOSIS — E87.20 ACIDOSIS: ICD-10-CM

## 2017-08-12 DIAGNOSIS — H53.2 DOUBLE VISION: ICD-10-CM

## 2017-08-12 DIAGNOSIS — Z95.1 POSTSURGICAL AORTOCORONARY BYPASS STATUS: ICD-10-CM

## 2017-08-12 DIAGNOSIS — E11.59 TYPE 2 DIABETES MELLITUS WITH OTHER CIRCULATORY COMPLICATIONS (H): ICD-10-CM

## 2017-08-12 DIAGNOSIS — I25.10 CORONARY ARTERY DISEASE INVOLVING NATIVE CORONARY ARTERY OF NATIVE HEART WITHOUT ANGINA PECTORIS: ICD-10-CM

## 2017-08-12 DIAGNOSIS — E87.29 HYPERCHLOREMIC METABOLIC ACIDOSIS: ICD-10-CM

## 2017-08-12 DIAGNOSIS — N18.4 ACUTE RENAL FAILURE SUPERIMPOSED ON STAGE 4 CHRONIC KIDNEY DISEASE, UNSPECIFIED ACUTE RENAL FAILURE TYPE (H): ICD-10-CM

## 2017-08-12 DIAGNOSIS — N17.9 ACUTE KIDNEY INJURY (NONTRAUMATIC) (H): ICD-10-CM

## 2017-08-12 DIAGNOSIS — I48.91 ATRIAL FIBRILLATION WITH RAPID VENTRICULAR RESPONSE (H): ICD-10-CM

## 2017-08-12 DIAGNOSIS — N17.9 ACUTE RENAL FAILURE SUPERIMPOSED ON STAGE 4 CHRONIC KIDNEY DISEASE, UNSPECIFIED ACUTE RENAL FAILURE TYPE (H): ICD-10-CM

## 2017-08-12 DIAGNOSIS — I25.2 HX OF NON-ST ELEVATION MYOCARDIAL INFARCTION (NSTEMI): ICD-10-CM

## 2017-08-12 DIAGNOSIS — N18.9 ACUTE ON CHRONIC RENAL FAILURE (H): ICD-10-CM

## 2017-08-12 DIAGNOSIS — Z79.01 LONG TERM (CURRENT) USE OF ANTICOAGULANTS: ICD-10-CM

## 2017-08-12 DIAGNOSIS — N17.9 ACUTE ON CHRONIC RENAL FAILURE (H): ICD-10-CM

## 2017-08-12 DIAGNOSIS — I25.10 ATHEROSCLEROSIS OF NATIVE CORONARY ARTERY OF NATIVE HEART WITHOUT ANGINA PECTORIS: ICD-10-CM

## 2017-08-12 DIAGNOSIS — N18.9 CHRONIC KIDNEY DISEASE, UNSPECIFIED CKD STAGE: ICD-10-CM

## 2017-08-12 DIAGNOSIS — L97.909 VENOUS STASIS ULCER (H): ICD-10-CM

## 2017-08-12 DIAGNOSIS — E53.8 VITAMIN B12 DEFICIENCY (NON ANEMIC): ICD-10-CM

## 2017-08-12 DIAGNOSIS — L03.119 CELLULITIS OF LOWER EXTREMITY, UNSPECIFIED LATERALITY: ICD-10-CM

## 2017-08-12 DIAGNOSIS — E11.42 DIABETIC POLYNEUROPATHY ASSOCIATED WITH TYPE 2 DIABETES MELLITUS (H): ICD-10-CM

## 2017-08-12 DIAGNOSIS — N18.4 CHRONIC KIDNEY DISEASE, STAGE IV (SEVERE) (H): ICD-10-CM

## 2017-08-12 DIAGNOSIS — I13.0 HYPERTENSIVE HEART AND CHRONIC KIDNEY DISEASE WITH HEART FAILURE AND STAGE 1 THROUGH STAGE 4 CHRONIC KIDNEY DISEASE, OR UNSPECIFIED CHRONIC KIDNEY DISEASE (H): ICD-10-CM

## 2017-08-12 DIAGNOSIS — E55.9 VITAMIN D DEFICIENCY: ICD-10-CM

## 2017-08-12 LAB
ABO + RH BLD: NORMAL
ABO + RH BLD: NORMAL
ALBUMIN SERPL-MCNC: 1.9 G/DL (ref 3.4–5)
ALBUMIN SERPL-MCNC: 2.1 G/DL (ref 3.4–5)
ALBUMIN UR-MCNC: 100 MG/DL
ALP SERPL-CCNC: 216 U/L (ref 40–150)
ALP SERPL-CCNC: 228 U/L (ref 40–150)
ALT SERPL W P-5'-P-CCNC: 44 U/L (ref 0–50)
ALT SERPL W P-5'-P-CCNC: 49 U/L (ref 0–50)
ANION GAP SERPL CALCULATED.3IONS-SCNC: 10 MMOL/L (ref 3–14)
ANION GAP SERPL CALCULATED.3IONS-SCNC: 9 MMOL/L (ref 3–14)
ANION GAP SERPL CALCULATED.3IONS-SCNC: 9 MMOL/L (ref 3–14)
APPEARANCE UR: ABNORMAL
AST SERPL W P-5'-P-CCNC: 32 U/L (ref 0–45)
AST SERPL W P-5'-P-CCNC: 45 U/L (ref 0–45)
BACTERIA #/AREA URNS HPF: ABNORMAL /HPF
BASE DEFICIT BLDA-SCNC: 10.9 MMOL/L
BASE DEFICIT BLDV-SCNC: 12.2 MMOL/L
BASE DEFICIT BLDV-SCNC: 13.2 MMOL/L
BASE DEFICIT BLDV-SCNC: 15.3 MMOL/L
BASE DEFICIT BLDV-SCNC: 18.3 MMOL/L
BASOPHILS # BLD AUTO: 0 10E9/L (ref 0–0.2)
BASOPHILS NFR BLD AUTO: 0.3 %
BILIRUB SERPL-MCNC: 0.2 MG/DL (ref 0.2–1.3)
BILIRUB SERPL-MCNC: 0.2 MG/DL (ref 0.2–1.3)
BILIRUB UR QL STRIP: NEGATIVE
BLD GP AB SCN SERPL QL: NORMAL
BLD PROD TYP BPU: NORMAL
BLOOD BANK CMNT PATIENT-IMP: NORMAL
BUN SERPL-MCNC: 41 MG/DL (ref 7–30)
BUN SERPL-MCNC: 42 MG/DL (ref 7–30)
BUN SERPL-MCNC: 43 MG/DL (ref 7–30)
CA-I BLD-MCNC: 4 MG/DL (ref 4.4–5.2)
CALCIUM SERPL-MCNC: 6.2 MG/DL (ref 8.5–10.1)
CALCIUM SERPL-MCNC: 6.4 MG/DL (ref 8.5–10.1)
CALCIUM SERPL-MCNC: 6.4 MG/DL (ref 8.5–10.1)
CHLORIDE SERPL-SCNC: 121 MMOL/L (ref 94–109)
CHLORIDE SERPL-SCNC: 124 MMOL/L (ref 94–109)
CHLORIDE SERPL-SCNC: 125 MMOL/L (ref 94–109)
CK SERPL-CCNC: 292 U/L (ref 30–225)
CO2 SERPL-SCNC: 13 MMOL/L (ref 20–32)
CO2 SERPL-SCNC: 14 MMOL/L (ref 20–32)
CO2 SERPL-SCNC: 18 MMOL/L (ref 20–32)
COLOR UR AUTO: YELLOW
CREAT SERPL-MCNC: 5.07 MG/DL (ref 0.52–1.04)
CREAT SERPL-MCNC: 5.13 MG/DL (ref 0.52–1.04)
CREAT SERPL-MCNC: 5.18 MG/DL (ref 0.52–1.04)
CRP SERPL-MCNC: 14 MG/L (ref 0–8)
DIFFERENTIAL METHOD BLD: ABNORMAL
EOSINOPHIL # BLD AUTO: 0.1 10E9/L (ref 0–0.7)
EOSINOPHIL NFR BLD AUTO: 0.7 %
ERYTHROCYTE [DISTWIDTH] IN BLOOD BY AUTOMATED COUNT: 21.6 % (ref 10–15)
ERYTHROCYTE [DISTWIDTH] IN BLOOD BY AUTOMATED COUNT: 22.1 % (ref 10–15)
ERYTHROCYTE [SEDIMENTATION RATE] IN BLOOD BY WESTERGREN METHOD: 89 MM/H (ref 0–30)
ETHANOL SERPL-MCNC: <0.01 G/DL
GFR SERPL CREATININE-BSD FRML MDRD: 8 ML/MIN/1.7M2
GLUCOSE BLDC GLUCOMTR-MCNC: 119 MG/DL (ref 70–99)
GLUCOSE BLDC GLUCOMTR-MCNC: 119 MG/DL (ref 70–99)
GLUCOSE SERPL-MCNC: 103 MG/DL (ref 70–99)
GLUCOSE SERPL-MCNC: 125 MG/DL (ref 70–99)
GLUCOSE SERPL-MCNC: 136 MG/DL (ref 70–99)
GLUCOSE UR STRIP-MCNC: NEGATIVE MG/DL
GRAM STN SPEC: ABNORMAL
HCO3 BLD-SCNC: 15 MMOL/L (ref 21–28)
HCO3 BLDV-SCNC: 10 MMOL/L (ref 21–28)
HCO3 BLDV-SCNC: 13 MMOL/L (ref 21–28)
HCO3 BLDV-SCNC: 15 MMOL/L (ref 21–28)
HCO3 BLDV-SCNC: 15 MMOL/L (ref 21–28)
HCT VFR BLD AUTO: 25.9 % (ref 35–47)
HCT VFR BLD AUTO: 27.9 % (ref 35–47)
HGB BLD-MCNC: 7.8 G/DL (ref 11.7–15.7)
HGB BLD-MCNC: 8.2 G/DL (ref 11.7–15.7)
HGB UR QL STRIP: ABNORMAL
IMM GRANULOCYTES # BLD: 0.1 10E9/L (ref 0–0.4)
IMM GRANULOCYTES NFR BLD: 1.5 %
INR PPP: 5.47 (ref 0.86–1.14)
KETONES UR STRIP-MCNC: NEGATIVE MG/DL
LACTATE BLD-SCNC: 0.8 MMOL/L (ref 0.7–2.1)
LACTATE BLD-SCNC: 0.9 MMOL/L (ref 0.7–2.1)
LEUKOCYTE ESTERASE UR QL STRIP: ABNORMAL
LYMPHOCYTES # BLD AUTO: 0.9 10E9/L (ref 0.8–5.3)
LYMPHOCYTES NFR BLD AUTO: 9.2 %
Lab: ABNORMAL
MAGNESIUM SERPL-MCNC: 2 MG/DL (ref 1.6–2.3)
MCH RBC QN AUTO: 24.1 PG (ref 26.5–33)
MCH RBC QN AUTO: 24.3 PG (ref 26.5–33)
MCHC RBC AUTO-ENTMCNC: 29.4 G/DL (ref 31.5–36.5)
MCHC RBC AUTO-ENTMCNC: 30.1 G/DL (ref 31.5–36.5)
MCV RBC AUTO: 80 FL (ref 78–100)
MCV RBC AUTO: 83 FL (ref 78–100)
MICRO REPORT STATUS: ABNORMAL
MONOCYTES # BLD AUTO: 0.8 10E9/L (ref 0–1.3)
MONOCYTES NFR BLD AUTO: 8.4 %
MRSA DNA SPEC QL NAA+PROBE: NORMAL
MUCOUS THREADS #/AREA URNS LPF: PRESENT /LPF
NEUTROPHILS # BLD AUTO: 7.5 10E9/L (ref 1.6–8.3)
NEUTROPHILS NFR BLD AUTO: 79.9 %
NITRATE UR QL: NEGATIVE
NT-PROBNP SERPL-MCNC: ABNORMAL PG/ML (ref 0–1800)
NUM BPU REQUESTED: 1
O2/TOTAL GAS SETTING VFR VENT: 21 %
OXYHGB MFR BLDV: 33 %
PCO2 BLD: 34 MM HG (ref 35–45)
PCO2 BLDV: 36 MM HG (ref 40–50)
PCO2 BLDV: 39 MM HG (ref 40–50)
PCO2 BLDV: 41 MM HG (ref 40–50)
PCO2 BLDV: 44 MM HG (ref 40–50)
PH BLD: 7.26 PH (ref 7.35–7.45)
PH BLDV: 7.06 PH (ref 7.32–7.43)
PH BLDV: 7.1 PH (ref 7.32–7.43)
PH BLDV: 7.14 PH (ref 7.32–7.43)
PH BLDV: 7.19 PH (ref 7.32–7.43)
PH UR STRIP: 5.5 PH (ref 5–7)
PHOSPHATE SERPL-MCNC: 6.6 MG/DL (ref 2.5–4.5)
PLATELET # BLD AUTO: 248 10E9/L (ref 150–450)
PLATELET # BLD AUTO: 286 10E9/L (ref 150–450)
PO2 BLD: 92 MM HG (ref 80–105)
PO2 BLDV: 23 MM HG (ref 25–47)
PO2 BLDV: 24 MM HG (ref 25–47)
PO2 BLDV: 27 MM HG (ref 25–47)
PO2 BLDV: 51 MM HG (ref 25–47)
POTASSIUM SERPL-SCNC: 5 MMOL/L (ref 3.4–5.3)
POTASSIUM SERPL-SCNC: 5.2 MMOL/L (ref 3.4–5.3)
POTASSIUM SERPL-SCNC: 5.6 MMOL/L (ref 3.4–5.3)
PROT SERPL-MCNC: 4.7 G/DL (ref 6.8–8.8)
PROT SERPL-MCNC: 5 G/DL (ref 6.8–8.8)
RBC # BLD AUTO: 3.24 10E12/L (ref 3.8–5.2)
RBC # BLD AUTO: 3.38 10E12/L (ref 3.8–5.2)
RBC #/AREA URNS AUTO: >182 /HPF (ref 0–2)
SALICYLATES SERPL-MCNC: NORMAL MG/DL
SODIUM SERPL-SCNC: 146 MMOL/L (ref 133–144)
SODIUM SERPL-SCNC: 147 MMOL/L (ref 133–144)
SODIUM SERPL-SCNC: 149 MMOL/L (ref 133–144)
SP GR UR STRIP: 1.01 (ref 1–1.03)
SPECIMEN EXP DATE BLD: NORMAL
SPECIMEN SOURCE: ABNORMAL
SPECIMEN SOURCE: NORMAL
URN SPEC COLLECT METH UR: ABNORMAL
UROBILINOGEN UR STRIP-MCNC: NORMAL MG/DL (ref 0–2)
WBC # BLD AUTO: 7.3 10E9/L (ref 4–11)
WBC # BLD AUTO: 9.4 10E9/L (ref 4–11)
WBC #/AREA URNS AUTO: 2568 /HPF (ref 0–2)

## 2017-08-12 PROCEDURE — 80053 COMPREHEN METABOLIC PANEL: CPT | Performed by: INTERNAL MEDICINE

## 2017-08-12 PROCEDURE — 85025 COMPLETE CBC W/AUTO DIFF WBC: CPT | Performed by: FAMILY MEDICINE

## 2017-08-12 PROCEDURE — 83605 ASSAY OF LACTIC ACID: CPT | Performed by: INTERNAL MEDICINE

## 2017-08-12 PROCEDURE — 85652 RBC SED RATE AUTOMATED: CPT | Performed by: INTERNAL MEDICINE

## 2017-08-12 PROCEDURE — 40000275 ZZH STATISTIC RCP TIME EA 10 MIN

## 2017-08-12 PROCEDURE — 87641 MR-STAPH DNA AMP PROBE: CPT | Performed by: INTERNAL MEDICINE

## 2017-08-12 PROCEDURE — 87205 SMEAR GRAM STAIN: CPT | Performed by: INTERNAL MEDICINE

## 2017-08-12 PROCEDURE — 80329 ANALGESICS NON-OPIOID 1 OR 2: CPT | Performed by: FAMILY MEDICINE

## 2017-08-12 PROCEDURE — 93010 ELECTROCARDIOGRAM REPORT: CPT | Mod: Z6 | Performed by: FAMILY MEDICINE

## 2017-08-12 PROCEDURE — 80320 DRUG SCREEN QUANTALCOHOLS: CPT | Performed by: FAMILY MEDICINE

## 2017-08-12 PROCEDURE — 87077 CULTURE AEROBIC IDENTIFY: CPT | Performed by: INTERNAL MEDICINE

## 2017-08-12 PROCEDURE — 81001 URINALYSIS AUTO W/SCOPE: CPT | Performed by: INTERNAL MEDICINE

## 2017-08-12 PROCEDURE — 80053 COMPREHEN METABOLIC PANEL: CPT | Performed by: FAMILY MEDICINE

## 2017-08-12 PROCEDURE — 99291 CRITICAL CARE FIRST HOUR: CPT | Mod: Z6 | Performed by: FAMILY MEDICINE

## 2017-08-12 PROCEDURE — 82803 BLOOD GASES ANY COMBINATION: CPT | Performed by: FAMILY MEDICINE

## 2017-08-12 PROCEDURE — 87086 URINE CULTURE/COLONY COUNT: CPT | Performed by: INTERNAL MEDICINE

## 2017-08-12 PROCEDURE — 25000128 H RX IP 250 OP 636: Performed by: INTERNAL MEDICINE

## 2017-08-12 PROCEDURE — 86850 RBC ANTIBODY SCREEN: CPT | Performed by: INTERNAL MEDICINE

## 2017-08-12 PROCEDURE — 82085 ASSAY OF ALDOLASE: CPT | Performed by: INTERNAL MEDICINE

## 2017-08-12 PROCEDURE — 86901 BLOOD TYPING SEROLOGIC RH(D): CPT | Performed by: INTERNAL MEDICINE

## 2017-08-12 PROCEDURE — 25000128 H RX IP 250 OP 636: Performed by: STUDENT IN AN ORGANIZED HEALTH CARE EDUCATION/TRAINING PROGRAM

## 2017-08-12 PROCEDURE — 36415 COLL VENOUS BLD VENIPUNCTURE: CPT | Performed by: FAMILY MEDICINE

## 2017-08-12 PROCEDURE — 87040 BLOOD CULTURE FOR BACTERIA: CPT | Performed by: INTERNAL MEDICINE

## 2017-08-12 PROCEDURE — 83880 ASSAY OF NATRIURETIC PEPTIDE: CPT | Performed by: FAMILY MEDICINE

## 2017-08-12 PROCEDURE — 83735 ASSAY OF MAGNESIUM: CPT | Performed by: INTERNAL MEDICINE

## 2017-08-12 PROCEDURE — 87088 URINE BACTERIA CULTURE: CPT | Performed by: INTERNAL MEDICINE

## 2017-08-12 PROCEDURE — 99291 CRITICAL CARE FIRST HOUR: CPT | Mod: GC | Performed by: INTERNAL MEDICINE

## 2017-08-12 PROCEDURE — 40000802 ZZH SITE CHECK

## 2017-08-12 PROCEDURE — 82803 BLOOD GASES ANY COMBINATION: CPT | Performed by: INTERNAL MEDICINE

## 2017-08-12 PROCEDURE — 25000128 H RX IP 250 OP 636: Performed by: FAMILY MEDICINE

## 2017-08-12 PROCEDURE — 96375 TX/PRO/DX INJ NEW DRUG ADDON: CPT | Performed by: FAMILY MEDICINE

## 2017-08-12 PROCEDURE — 86900 BLOOD TYPING SEROLOGIC ABO: CPT | Performed by: INTERNAL MEDICINE

## 2017-08-12 PROCEDURE — 93005 ELECTROCARDIOGRAM TRACING: CPT

## 2017-08-12 PROCEDURE — 20000004 ZZH R&B ICU UMMC

## 2017-08-12 PROCEDURE — 96361 HYDRATE IV INFUSION ADD-ON: CPT | Performed by: FAMILY MEDICINE

## 2017-08-12 PROCEDURE — 74176 CT ABD & PELVIS W/O CONTRAST: CPT

## 2017-08-12 PROCEDURE — 85610 PROTHROMBIN TIME: CPT | Performed by: INTERNAL MEDICINE

## 2017-08-12 PROCEDURE — 82330 ASSAY OF CALCIUM: CPT | Performed by: INTERNAL MEDICINE

## 2017-08-12 PROCEDURE — 84100 ASSAY OF PHOSPHORUS: CPT | Performed by: INTERNAL MEDICINE

## 2017-08-12 PROCEDURE — 36415 COLL VENOUS BLD VENIPUNCTURE: CPT | Performed by: INTERNAL MEDICINE

## 2017-08-12 PROCEDURE — 82805 BLOOD GASES W/O2 SATURATION: CPT | Performed by: INTERNAL MEDICINE

## 2017-08-12 PROCEDURE — 40000141 ZZH STATISTIC PERIPHERAL IV START W/O US GUIDANCE

## 2017-08-12 PROCEDURE — 96374 THER/PROPH/DIAG INJ IV PUSH: CPT | Performed by: FAMILY MEDICINE

## 2017-08-12 PROCEDURE — 25000128 H RX IP 250 OP 636

## 2017-08-12 PROCEDURE — 83605 ASSAY OF LACTIC ACID: CPT | Performed by: FAMILY MEDICINE

## 2017-08-12 PROCEDURE — 87640 STAPH A DNA AMP PROBE: CPT | Performed by: INTERNAL MEDICINE

## 2017-08-12 PROCEDURE — 82803 BLOOD GASES ANY COMBINATION: CPT | Performed by: STUDENT IN AN ORGANIZED HEALTH CARE EDUCATION/TRAINING PROGRAM

## 2017-08-12 PROCEDURE — 73700 CT LOWER EXTREMITY W/O DYE: CPT | Mod: LT

## 2017-08-12 PROCEDURE — 86140 C-REACTIVE PROTEIN: CPT | Performed by: INTERNAL MEDICINE

## 2017-08-12 PROCEDURE — 99285 EMERGENCY DEPT VISIT HI MDM: CPT | Mod: 25 | Performed by: FAMILY MEDICINE

## 2017-08-12 PROCEDURE — 87186 SC STD MICRODIL/AGAR DIL: CPT | Performed by: INTERNAL MEDICINE

## 2017-08-12 PROCEDURE — 93010 ELECTROCARDIOGRAM REPORT: CPT | Performed by: INTERNAL MEDICINE

## 2017-08-12 PROCEDURE — A9270 NON-COVERED ITEM OR SERVICE: HCPCS | Mod: GY | Performed by: STUDENT IN AN ORGANIZED HEALTH CARE EDUCATION/TRAINING PROGRAM

## 2017-08-12 PROCEDURE — 00000146 ZZHCL STATISTIC GLUCOSE BY METER IP

## 2017-08-12 PROCEDURE — 82550 ASSAY OF CK (CPK): CPT | Performed by: INTERNAL MEDICINE

## 2017-08-12 PROCEDURE — 71020 XR CHEST 2 VW: CPT | Mod: TC

## 2017-08-12 PROCEDURE — 93005 ELECTROCARDIOGRAM TRACING: CPT | Performed by: FAMILY MEDICINE

## 2017-08-12 PROCEDURE — 25000132 ZZH RX MED GY IP 250 OP 250 PS 637: Mod: GY | Performed by: STUDENT IN AN ORGANIZED HEALTH CARE EDUCATION/TRAINING PROGRAM

## 2017-08-12 PROCEDURE — 87070 CULTURE OTHR SPECIMN AEROBIC: CPT | Performed by: INTERNAL MEDICINE

## 2017-08-12 PROCEDURE — 85027 COMPLETE CBC AUTOMATED: CPT | Performed by: INTERNAL MEDICINE

## 2017-08-12 PROCEDURE — 96376 TX/PRO/DX INJ SAME DRUG ADON: CPT | Performed by: FAMILY MEDICINE

## 2017-08-12 PROCEDURE — 80048 BASIC METABOLIC PNL TOTAL CA: CPT | Performed by: INTERNAL MEDICINE

## 2017-08-12 PROCEDURE — 36600 WITHDRAWAL OF ARTERIAL BLOOD: CPT

## 2017-08-12 RX ORDER — FUROSEMIDE 10 MG/ML
20 INJECTION INTRAMUSCULAR; INTRAVENOUS ONCE
Status: COMPLETED | OUTPATIENT
Start: 2017-08-12 | End: 2017-08-12

## 2017-08-12 RX ORDER — NALOXONE HYDROCHLORIDE 0.4 MG/ML
.1-.4 INJECTION, SOLUTION INTRAMUSCULAR; INTRAVENOUS; SUBCUTANEOUS
Status: DISCONTINUED | OUTPATIENT
Start: 2017-08-12 | End: 2017-08-18 | Stop reason: HOSPADM

## 2017-08-12 RX ORDER — HYDROMORPHONE HYDROCHLORIDE 1 MG/ML
0.5 INJECTION, SOLUTION INTRAMUSCULAR; INTRAVENOUS; SUBCUTANEOUS ONCE
Status: COMPLETED | OUTPATIENT
Start: 2017-08-12 | End: 2017-08-12

## 2017-08-12 RX ORDER — SODIUM CHLORIDE 9 MG/ML
INJECTION, SOLUTION INTRAVENOUS
Status: COMPLETED
Start: 2017-08-12 | End: 2017-08-12

## 2017-08-12 RX ORDER — ACETAMINOPHEN 325 MG/1
650 TABLET ORAL EVERY 4 HOURS PRN
Status: DISCONTINUED | OUTPATIENT
Start: 2017-08-12 | End: 2017-08-18 | Stop reason: HOSPADM

## 2017-08-12 RX ORDER — CEFTRIAXONE 2 G/1
2 INJECTION, POWDER, FOR SOLUTION INTRAMUSCULAR; INTRAVENOUS EVERY 24 HOURS
Status: DISCONTINUED | OUTPATIENT
Start: 2017-08-12 | End: 2017-08-13

## 2017-08-12 RX ORDER — ACETAMINOPHEN 650 MG/1
650 SUPPOSITORY RECTAL EVERY 4 HOURS PRN
Status: DISCONTINUED | OUTPATIENT
Start: 2017-08-12 | End: 2017-08-18 | Stop reason: HOSPADM

## 2017-08-12 RX ORDER — GABAPENTIN 300 MG/1
300 CAPSULE ORAL AT BEDTIME
Status: DISCONTINUED | OUTPATIENT
Start: 2017-08-12 | End: 2017-08-18 | Stop reason: HOSPADM

## 2017-08-12 RX ORDER — SODIUM BICARBONATE 650 MG/1
650 TABLET ORAL 3 TIMES DAILY
Status: DISCONTINUED | OUTPATIENT
Start: 2017-08-12 | End: 2017-08-15

## 2017-08-12 RX ADMIN — GABAPENTIN 300 MG: 300 CAPSULE ORAL at 21:42

## 2017-08-12 RX ADMIN — VANCOMYCIN HYDROCHLORIDE 2000 MG: 1 INJECTION, POWDER, LYOPHILIZED, FOR SOLUTION INTRAVENOUS at 18:13

## 2017-08-12 RX ADMIN — SODIUM BICARBONATE 50 MEQ: 84 INJECTION, SOLUTION INTRAVENOUS at 16:58

## 2017-08-12 RX ADMIN — SODIUM CHLORIDE 1000 ML: 9 INJECTION, SOLUTION INTRAVENOUS at 16:58

## 2017-08-12 RX ADMIN — Medication 50 MEQ: at 12:48

## 2017-08-12 RX ADMIN — PHYTONADIONE 5 MG: 10 INJECTION, EMULSION INTRAMUSCULAR; INTRAVENOUS; SUBCUTANEOUS at 18:34

## 2017-08-12 RX ADMIN — Medication 50 MEQ: at 13:55

## 2017-08-12 RX ADMIN — Medication 50 MEQ: at 13:51

## 2017-08-12 RX ADMIN — Medication 50 MEQ: at 17:22

## 2017-08-12 RX ADMIN — ACETAMINOPHEN 650 MG: 325 TABLET, FILM COATED ORAL at 20:39

## 2017-08-12 RX ADMIN — SODIUM BICARBONATE 50 MEQ: 84 INJECTION, SOLUTION INTRAVENOUS at 22:57

## 2017-08-12 RX ADMIN — CEFTRIAXONE 2 G: 2 INJECTION, POWDER, FOR SOLUTION INTRAMUSCULAR; INTRAVENOUS at 19:10

## 2017-08-12 RX ADMIN — SODIUM BICARBONATE 50 MEQ: 84 INJECTION, SOLUTION INTRAVENOUS at 17:22

## 2017-08-12 RX ADMIN — FUROSEMIDE 20 MG: 10 INJECTION, SOLUTION INTRAVENOUS at 20:43

## 2017-08-12 RX ADMIN — SODIUM CHLORIDE 500 ML: 9 INJECTION, SOLUTION INTRAVENOUS at 12:45

## 2017-08-12 RX ADMIN — Medication 50 MEQ: at 12:45

## 2017-08-12 RX ADMIN — SODIUM BICARBONATE 650 MG TABLET 650 MG: at 23:52

## 2017-08-12 RX ADMIN — HYDROMORPHONE HYDROCHLORIDE 0.5 MG: 1 INJECTION, SOLUTION INTRAMUSCULAR; INTRAVENOUS; SUBCUTANEOUS at 12:43

## 2017-08-12 NOTE — ED PROVIDER NOTES
History     Chief Complaint   Patient presents with     Leg Pain     HPI  Kathryn Banks is a 77 year old female with chronic lymphedema and stasis ulcers along with multiple other complex medical problems including coronary artery disease, chronic heart failure, chronic kidney disease stage IV, anemia of chronic renal failure, type 2 diabetes with circulatory complications, A. fib with RVR, long-term use of anticoagulants, pulmonary hypertension-chronic edema-anasarca, hypertensive heart and kidney disease, who now presents to the emergency room brought in by her granddaughter with intractable leg pain. She was able to get a hold of her primary care physician Dr. Dheeraj Jj yesterday and he prescribed some tramadol. She took 2 tramadol at approximately 8:30 this morning. At the time she arrived here and now feels the medication starting to work with good relief and she is somewhat sleepy. The granddaughter states her legs have looked worse the past week with increased number of sores, increased weeping, increased swelling and some redness. Her increased pain coincides with this. The patient was on Coumadin for A. fib/flutter and had a ablation 6/7/2017 into sinus rhythm. Her Coumadin was discontinued and switched to aspirin. Her metoprolol was decreased from 50-25.    Patient Active Problem List   Diagnosis     Restless leg syndrome     Sleep apnea     Lipoma of other skin and subcutaneous tissue     ESOPHAGEAL REFLUX     Postsurgical aortocoronary bypass status     Iron deficiency anemia     History of peptic ulcer disease     Edema     Kidney stone     Hyperlipidemia LDL goal <100     Advanced directives, counseling/discussion     CAD - NSTEMI, CABG x 5 2007, angio in 2013 with patent grafts other than occluded graft to PL     Hx of non-ST elevation myocardial infarction (NSTEMI)     Health Care Home     Lymphedema     Anemia of chronic renal failure, stage 4 (severe) (H)     Renal failure     Osteoarthritis  of hip     Non morbid obesity, unspecified obesity type     Type 2 diabetes mellitus with other circulatory complications (H)     Long-term (current) use of anticoagulants [Z79.01]     Acute renal failure with tubular necrosis (H)     Essential hypertension with goal blood pressure less than 140/90     Atrial fibrillation with rapid ventricular response (H)     Rash - redness medial thighs     Metabolic acidosis, normal anion gap (NAG)     Pulmonary hypertension (H)     Chronic heart failure (H) with preserved EF     Atrial flutter (H) - present 6/12     Generalized edema - anasarca     Hypertensive heart and chronic kidney disease with heart failure and stage 1 through stage 4 chronic kidney disease, or unspecified chronic kidney disease (H)     Supratherapeutic INR     Proteinuria 2.1 g/g Cr     Bradycardia     Acute on chronic renal failure (H)     Memory loss     Chronic atrial fibrillation (H) on 6/12-6/13     Blood in stool     Microscopic hematuria     Acute kidney injury (nontraumatic) (H)     CHF (congestive heart failure) (H)     Anemia     Anemia, iron deficiency         No current facility-administered medications for this encounter.      Current Outpatient Prescriptions   Medication     traMADol (ULTRAM) 50 MG tablet     metoprolol (LOPRESSOR) 50 MG tablet     aspirin 81 MG tablet     amLODIPine (NORVASC) 5 MG tablet     sodium bicarbonate 650 MG tablet     calcium acetate (PHOSLO) 667 MG CAPS capsule     furosemide (LASIX) 40 MG tablet     pravastatin (PRAVACHOL) 40 MG tablet     acetaminophen (TYLENOL) 325 MG tablet     nitroglycerin (NITROSTAT) 0.4 MG sublingual tablet     gabapentin (NEURONTIN) 300 MG capsule     pramipexole (MIRAPEX) 0.5 MG tablet     ferrous sulfate (IRON) 325 (65 FE) MG tablet     calcium carbonate-vitamin D 500-400 MG-UNIT TABS per tablet     cyanocobalamin (VITAMIN B12) 1000 MCG/ML injection     ORDER FOR DME     Allergies   Allergen Reactions     Ciprofloxacin Nausea and  Vomiting     Zofran did not help     Oxycodone Visual Disturbance     Delusions, blackouts      Lisinopril Cough     Penicillins Rash     Sulfa Drugs GI Disturbance     LOSS OF TASTE     Past Surgical History:   Procedure Laterality Date     ANGIOPLASTY       C APPENDECTOMY       C CABG, VEIN, FIVE  12/06    SVG x 4 and LIMA to LAD     C SOTO W/O FACETEC FORAMOT/[facility] 1/2 VRT SEG, LUMBAR  1968     C REMV CATARACT INTRACAP,INSERT LENS      Bilateral     C TOTAL ABDOM HYSTERECTOMY  1995     - fibroids     C VAGOTOMY/PYLOROPLASTY,SELECT  1970     COLONOSCOPY  12/3/2007     COLONOSCOPY  1/17/2014    Procedure: COLONOSCOPY;  Colonoscopy;  Surgeon: Zack Stearns MD;  Location:  GI     CYSTOSCOPY  11/25/09    [facility]     ENDOSCOPY  03/21/2000    Upper GI     HC COLONOSCOPY THRU STOMA, DIAGNOSTIC  10/7/04    poor prep, repeat in 2-3 years     HC COLONOSCOPY W/WO BRUSH/WASH  10/31/07    Repeat-poor quality prep     HC REMOVAL OF OVARY/TUBE(S)      Salpingo-Oophorectomy, Unilateral     HC REVISE MEDIAN N/CARPAL TUNNEL SURG      Carpal tunnel release     HC UGI ENDOSCOPY DIAG W BIOPSY  10/31/07     HC UGI ENDOSCOPY, SIMPLE EXAM  12/3/2007     OPEN REDUCTION INTERNAL FIXATION HIP NAILING Left 7/3/2016    Procedure: OPEN REDUCTION INTERNAL FIXATION HIP NAILING;  Surgeon: Lake Schulz MD;  Location:  OR     Social History     Social History     Marital status:      Spouse name: Urbano Banks     Number of children: 2     Years of education: 13     Occupational History     Ministry coordinator      [employer]     Social History Main Topics     Smoking status: Former Smoker     Years: 10.00     Quit date: 1/1/1969     Smokeless tobacco: Never Used     Alcohol use 0.0 oz/week     0 Standard drinks or equivalent per week      Comment: occasional- 2 drinks per month     Drug use: No     Sexual activity: Yes     Birth control/ protection: Surgical     Other Topics Concern      Parent/Sibling W/ Cabg, Mi Or Angioplasty Before 65f 55m? No     Social History Narrative       I have reviewed the Medications, Allergies, Past Medical and Surgical History, and Social History in the Epic system.         Review of Systems   Unable to perform ROS: Acuity of condition       Physical Exam   BP: 97/43  Pulse: (!) 49  Temp: 96.2  F (35.7  C)  Resp: 18  Weight: 81.6 kg (180 lb)  SpO2: 99 %  Physical Exam   Constitutional: She appears distressed.   Awake but lethargic appearing 77-year-old female. She answers questionsappropriately. She is afebrile. She is quite tachypneic but in no increased work of breathing or labored breathing and no wheezing. Normal O2 sats on room air. She is bradycardic but per her chart usually is and she is on a beta blocker. She is also hypotensive. Cardiac.BP 97/43  Pulse (!) 49  Temp 96.2  F (35.7  C) (Temporal)  Resp 18  Wt 81.6 kg (180 lb)  SpO2 99%  BMI 30.9 kg/m2     HENT:   Head: Normocephalic and atraumatic.   Right Ear: External ear normal.   Left Ear: External ear normal.   Nose: Nose normal.   Mouth/Throat: Oropharynx is clear and moist.   Eyes: Conjunctivae are normal.   Neck: Normal range of motion. Neck supple.   Cardiovascular: Normal rate, regular rhythm and normal heart sounds.    Pulmonary/Chest: Effort normal and breath sounds normal. She has no wheezes. She has no rales.   Tachypnea. No increased work of breathing. No wheezing or rhonchi or rales. O2 sats normal on room air. I suspect etiology of tachypnea is secondary to metabolic acidosis.   Abdominal: Soft. Bowel sounds are normal.   Musculoskeletal:   She has gross pitting edema to the knee with chronic stasis ulcers which are open without any pain. There is mild erythema and chronic skin changes. Very sensitive to the touch.   Neurological: She is alert.   She is awake oriented to person place and time. She talks with her granddaughter. She does appear somewhat lethargic. She attributes this to the  2 tramadol she took this morning. Granddaughter states that she was seen 3 people in the room prior to my arrival. She does not appear to be hallucinating at this time. Patient aware that she is seeing things that are not there. Her pupils are equal reactive and accommodate. Her face is symmetric. Hand  is equal. Leg strength not tested because of severe leg pain.   Skin: Skin is warm and dry. She is not diaphoretic. There is erythema.   Nursing note and vitals reviewed.      ED Course     ED Course     Results for orders placed or performed during the hospital encounter of 08/12/17 (from the past 48 hour(s))   CBC with platelets differential   Result Value Ref Range    WBC 9.4 4.0 - 11.0 10e9/L    RBC Count 3.38 (L) 3.8 - 5.2 10e12/L    Hemoglobin 8.2 (L) 11.7 - 15.7 g/dL    Hematocrit 27.9 (L) 35.0 - 47.0 %    MCV 83 78 - 100 fl    MCH 24.3 (L) 26.5 - 33.0 pg    MCHC 29.4 (L) 31.5 - 36.5 g/dL    RDW 22.1 (H) 10.0 - 15.0 %    Platelet Count 286 150 - 450 10e9/L    Diff Method Automated Method     % Neutrophils 79.9 %    % Lymphocytes 9.2 %    % Monocytes 8.4 %    % Eosinophils 0.7 %    % Basophils 0.3 %    % Immature Granulocytes 1.5 %    Absolute Neutrophil 7.5 1.6 - 8.3 10e9/L    Absolute Lymphocytes 0.9 0.8 - 5.3 10e9/L    Absolute Monocytes 0.8 0.0 - 1.3 10e9/L    Absolute Eosinophils 0.1 0.0 - 0.7 10e9/L    Absolute Basophils 0.0 0.0 - 0.2 10e9/L    Abs Immature Granulocytes 0.1 0 - 0.4 10e9/L   Comprehensive metabolic panel   Result Value Ref Range    Sodium 146 (H) 133 - 144 mmol/L    Potassium 5.6 (H) 3.4 - 5.3 mmol/L    Chloride 124 (H) 94 - 109 mmol/L    Carbon Dioxide 13 (L) 20 - 32 mmol/L    Anion Gap 9 3 - 14 mmol/L    Glucose 136 (H) 70 - 99 mg/dL    Urea Nitrogen 42 (H) 7 - 30 mg/dL    Creatinine 5.18 (H) 0.52 - 1.04 mg/dL    GFR Estimate 8 (L) >60 mL/min/1.7m2    GFR Estimate If Black 10 (L) >60 mL/min/1.7m2    Calcium 6.4 (L) 8.5 - 10.1 mg/dL    Bilirubin Total 0.2 0.2 - 1.3 mg/dL    Albumin  2.1 (L) 3.4 - 5.0 g/dL    Protein Total 5.0 (L) 6.8 - 8.8 g/dL    Alkaline Phosphatase 228 (H) 40 - 150 U/L    ALT 49 0 - 50 U/L    AST 45 0 - 45 U/L   Lactic acid whole blood   Result Value Ref Range    Lactic Acid 0.9 0.7 - 2.1 mmol/L   Nt probnp inpatient   Result Value Ref Range    N-Terminal Pro BNP Inpatient 91052 (H) 0 - 1800 pg/mL   Blood gas venous   Result Value Ref Range    Ph Venous 7.06 (LL) 7.32 - 7.43 pH    PCO2 Venous 36 (L) 40 - 50 mm Hg    PO2 Venous 27 25 - 47 mm Hg    Bicarbonate Venous 10 (LL) 21 - 28 mmol/L    Base Deficit Venous 18.3 mmol/L    FIO2 21    Alcohol ethyl   Result Value Ref Range    Ethanol g/dL <0.01 <0.01 g/dL   Salicylate level   Result Value Ref Range    Salicylate Level  mg/dL     <2  Therapeutic:        <20   Anti inflammatory:  15-30     XR Chest 2 Views    Narrative    CHEST TWO VIEWS   8/12/2017 10:25 AM     HISTORY: Shortness of breath.    COMPARISON: 7/6/2017.    FINDINGS: Sternotomy. Patient is slightly rotated to the right. Slight  blunting left posterior costophrenic gutter is unchanged from previous  study. It may be fluid or thickened pleura.      Impression    IMPRESSION:   1. Blunted left costophrenic angle, unchanged in the interval.  2. No new infiltrates.    THU GUAMAN MD     *Note: Due to a large number of results and/or encounters for the requested time period, some results have not been displayed. A complete set of results can be found in Results Review.              EKG Interpretation:      Interpreted by Jason Gonzales   Symptoms at time of EKG: None   Rhythm: Sinus bradycardia  Rate: 40-50  Axis: Normal  Ectopy: None  Conduction: Normal  ST Segments/ T Waves: No ST-T wave changes and No acute ischemic changes  Q Waves: None  Comparison to prior: Atrial fib flutter has resolved    Clinical Impression: Sinus bradycardia at a rate of 48 with no acute ST-T changes. Low voltage. Resolution of previous A. fib/flutter. No hyperacute T waves. Potassium  "5.6            Procedures             Critical Care time:  30 min--patient tachypneic,hypotensive, bradycardic, in distress, altered sensorium.                   Assessments & Plan (with Medical Decision Making)   [facility]--this patient is a very medically complex 77-year-old female with known stage IV chronic renal failure, coronary artery disease, chronic heart failure, type 2 diabetes, A. fib with RVR status post ablation last month. She presents to the ED with complaint of increased leg pain from her chronic lymphedema and stasis ulcers. She appeared to be a little delirious and tachypnea on arrival. Her granddaughter who is her primary caregiver at home states that she has been \"a little out of it and hallucinating at times\". She is found on her labs to have acute exacerbation of her chronic renal failure including rather severe metabolic acidosis. I suspect this is hypochloremic metabolic acidosis. Her potassium is 5.6. Her creatinine has gone from 3.2-5.2 in 1 month 6 of 18. She has a tachypnea and her PCO2 is 36 despite this her pH is still 7.06. She has produced no urine here. She is afebrile, her white count is normal and her lactate is normal. I do not see sign of sepsis. She has chronic lymphedema with numerous open shallow-based stasis ulcers with a little surrounding erythema but no overt cellulitis. I discussed all these findings with the patient and her granddaughter. I suspect she will need dialysis. She has thought about this and discuss this with her doctors and she would like to proceed with dialysis. That is not available here and I recommended transfer to the [place] where her nephrologist is. Her nephrologist is . [facility] as she will need the coordinated care of multiple specialties and that she is critically ill. The granddaughter is in full agreement.    12:02 PM  Discussed with Anita Ambrose and Ambrose--triage, nephrology, intensivist. Accepted the " patient to -ICU. Prior to transfer we will administer 2 A of bicarbonate and 500 cc of normal saline..    1:32 PM--patient received 2 A of bicarbonate and 500 cc of normal saline. Repeat VBG shows pH to 7.1 and bicarbonate of 13. I will repeat 2 A of bicarbonate and transfer.    I have reviewed the nursing notes.    I have reviewed the findings, diagnosis, plan and need for follow up with the patient.            New Prescriptions    No medications on file       Final diagnoses:   Acute renal failure superimposed on stage 4 chronic kidney disease, unspecified acute renal failure type (H)   Hyperkalemia   Hyperchloremic metabolic acidosis   Acute on chronic systolic congestive heart failure (H)   Hypertensive heart and chronic kidney disease with heart failure and stage 1 through stage 4 chronic kidney disease, or unspecified chronic kidney disease (H)       8/12/2017   [facility] EMERGENCY DEPARTMENT     Christian, Jason HENDERSON MD  08/12/17 [id number]       Christian, Jason HENDERSON MD  08/12/17 [id number]

## 2017-08-12 NOTE — IP AVS SNAPSHOT
Kathryn Banks #[id number] (CSN: [id number])  (77 year old F)  (Adm: 17)     [id number]               [address] [place] [address] BANK: [phone]            Patient Demographics     Patient Name Sex          Age SSN Address Phone    Kathryn Banks Female 1940 (77 year old) [id number] [address] [place] [address] [phone] (Home)  none (Work)  none (Mobile)      Emergency Contact(s)     Name Relation Home Work Mobile    Urbano Banks Spouse [phone] none none    yoshi roth Daughter [phone] none [phone]      Admission Information     Attending Provider Admitting Provider Admission Type Admission Date/Time    Yareli Lorenzo MD Henke, Ricco Amato MD Urgent 17  1608    Discharge Date Hospital Service Auth/Cert Status Service Area     Internal Medicine Incomplete [facility]    Unit Room/Bed Admission Status       U U5A 5206/5206-01 Admission (Confirmed)       Admission     Complaint    complete renal failure, Acidosis      Hospital Account     Name Acct ID Class Status Primary Coverage    Kathryn Banks [id number] Inpatient Open MEDICARE - MEDICARE FOR HB SUPPLEMENT            Guarantor Account (for Hospital Account #[id number])     Name Relation to Pt Service Area Active? Acct Type    JacobomeredithKathryn  FCS Yes Personal/Family    Address Phone          [address] [place] [address] [phone](H)              Coverage Information (for Hospital Account #[id number])     1. MEDICARE/MEDICARE FOR HB SUPPLEMENT     F/O Payor/Plan Precert #    MEDICARE/MEDICARE FOR HB SUPPLEMENT     Subscriber Subscriber #    Kathryn Banks MICYK [id number]    Address Phone    ATTN CLAIMS  [address]  [facility] [place] [address] [phone]          2. BCBS/BCBS PLATINUM BLUE     F/O Payor/Plan Precert #    BCBS/BCBS PLATINUM BLUE     Subscriber Subscriber #    DallasCas barrowfrancie WEEKS [id number]    Address Phone    [address] [address]  [place]  "[address] [phone]                                                      INTERAGENCY TRANSFER FORM - PHYSICIAN ORDERS   8/12/2017                       [address] [facility]: [phone]            Attending Provider: Yareli Lorenzo MD     Allergies:  Ciprofloxacin, Oxycodone, Lisinopril, Penicillins, Sulfa Drugs    Infection:  None   Service:  INTERNAL MED    Ht:  1.626 m (5' 4\")   Wt:  82 kg (180 lb 12.8 oz)   Admission Wt:  83.3 kg (183 lb 11.2 oz)    BMI:  31.03 kg/m 2   BSA:  1.92 m 2            ED Clinical Impression     Diagnosis Description Comment Added By Time Added    Acute kidney injury (nontraumatic) (H) [N17.9] Acute kidney injury (nontraumatic) (H) [N17.9]  Donna Dickerson RN 8/14/2017 12:11 PM    Leg pain, bilateral [M79.604, M79.605] Leg pain, bilateral [M79.604, M79.605]  Lake Pierre MD 8/18/2017 11:09 AM    Acute on chronic renal failure (H) [N17.9, N18.9] Acute on chronic renal failure (H) [N17.9, N18.9]  Lake Pierre MD 8/18/2017 11:10 AM    Coronary artery disease involving native coronary artery of native heart without angina pectoris [I25.10] Coronary artery disease involving native coronary artery of native heart without angina pectoris [I25.10]  Lake Pierre MD 8/18/2017 11:10 AM    Hx of non-ST elevation myocardial infarction (NSTEMI) [I25.2] Hx of non-ST elevation myocardial infarction (NSTEMI) [I25.2]  Lake Pierre MD 8/18/2017 11:11 AM    Atherosclerosis of native coronary artery of native heart without angina pectoris [I25.10] Atherosclerosis of native coronary artery of native heart without angina pectoris [I25.10]  Lake Pierre MD 8/18/2017 11:11 AM    Postsurgical aortocoronary bypass status [Z95.1] Postsurgical aortocoronary bypass status [Z95.1]  Lake Pierre MD 8/18/2017 11:11 AM    Diabetic polyneuropathy associated with type 2 diabetes mellitus (H) [E11.42] Diabetic polyneuropathy associated with type 2 diabetes " mellitus (H) [E11.42]  Lake Pierre MD 8/18/2017 11:11 AM    Type 2 diabetes mellitus with other circulatory complications (H) [E11.59] Type 2 diabetes mellitus with other circulatory complications (H) [E11.59]  Lake Pierre MD 8/18/2017 11:11 AM    Restless legs syndrome (RLS) [G25.81] Restless legs syndrome (RLS) [G25.81]  Lake Pierre MD 8/18/2017 11:12 AM    Atrial fibrillation with rapid ventricular response (H) [I48.91] Atrial fibrillation with rapid ventricular response (H) [I48.91]  Lake Pierre MD 8/18/2017 11:12 AM    Benign essential hypertension [I10] Benign essential hypertension [I10]  Lake Pierre MD 8/18/2017 11:12 AM    Urinary frequency [R35.0] Urinary frequency [R35.0]  Lake Pierre MD 8/18/2017 11:14 AM    Urinary tract infection without hematuria, site unspecified [N39.0] Urinary tract infection without hematuria, site unspecified [N39.0]  Lake Pierre MD 8/18/2017 11:14 AM    Cellulitis of lower extremity, unspecified laterality [L03.119] Cellulitis of lower extremity, unspecified laterality [L03.119]  Lake Pierre MD 8/18/2017 11:14 AM    Vitamin B12 deficiency (non anemic) [E53.8] Vitamin B12 deficiency (non anemic) [E53.8]  Lake Pierre MD 8/18/2017 11:15 AM    Iron deficiency anemia secondary to inadequate dietary iron intake [D50.8] Iron deficiency anemia secondary to inadequate dietary iron intake [D50.8]  Lake Pierre MD 8/18/2017 11:16 AM    Venous stasis ulcer (H) [I83.009, L97.909] Venous stasis ulcer (H) [I83.009, L97.909]  Lake Pierre MD 8/18/2017 11:17 AM    Vitamin D deficiency [E55.9] Vitamin D deficiency [E55.9]  Lake Pierre MD 8/18/2017 11:18 AM    Chronic kidney disease, unspecified CKD stage [N18.9] Chronic kidney disease, unspecified CKD stage [N18.9]  Lake Pierre MD 8/18/2017 11:30 AM      Hospital Problems as of 8/18/2017              Priority Class Noted POA     Acidosis Medium  8/12/2017 Yes      Non-Hospital Problems as of 8/18/2017              Priority Class Noted    Sleep apnea Medium  Unknown    Restless leg syndrome Low  Unknown    Lipoma of other skin and subcutaneous tissue Medium  9/7/2004    ESOPHAGEAL REFLUX Medium  3/28/2006    Postsurgical aortocoronary bypass status Low  1/27/2007    Iron deficiency anemia Medium  10/17/2007    History of peptic ulcer disease Low  10/17/2007    Edema Medium  1/31/2008    Kidney stone Medium  12/29/2009    Hyperlipidemia LDL goal <100 Medium  10/31/2010    Advanced directives, counseling/discussion Low  11/12/2012    CAD - NSTEMI, CABG x 5 2007, angio in 2013 with patent grafts other than occluded graft to PL Medium  2/4/2013    Hx of non-ST elevation myocardial infarction (NSTEMI) Medium  2/4/2013    Health Care Home Medium  1/20/2014    Lymphedema Medium  11/10/2014    Anemia of chronic renal failure, stage 4 (severe) (H) Medium  11/10/2014    Renal failure Medium  3/23/2015    Osteoarthritis of hip Medium  4/29/2015    Non morbid obesity, unspecified obesity type Medium  11/1/2015    Type 2 diabetes mellitus with other circulatory complications (H) Medium  1/19/2016    Long-term (current) use of anticoagulants [Z79.01] Medium  3/4/2016    Acute renal failure with tubular necrosis (H) Medium  7/2/2016    Essential hypertension with goal blood pressure less than 140/90 Medium  9/13/2016    Atrial fibrillation with rapid ventricular response (H) Medium  4/28/2017    Rash - redness medial thighs Medium  4/29/2017    Metabolic acidosis, normal anion gap (NAG) Medium  4/30/2017    Pulmonary hypertension (H) Medium  5/24/2017    Chronic heart failure (H) with preserved EF Medium  5/31/2017    Atrial flutter (H) - present 6/12 Medium  5/31/2017    Generalized edema - anasarca Medium  6/1/2017    Hypertensive heart and chronic kidney disease with heart failure and stage 1 through stage 4 chronic kidney disease, or unspecified  chronic kidney disease (H) Medium  6/1/2017    Supratherapeutic INR Medium  6/1/2017    Proteinuria 2.1 g/g Cr Medium  6/2/2017    Bradycardia Medium  6/2/2017    Acute on chronic renal failure (H) Medium  6/12/2017    Memory loss Medium  6/12/2017    Chronic atrial fibrillation (H) on 6/12-6/13 Medium  6/12/2017    Blood in stool Medium  6/14/2017    Microscopic hematuria Medium  6/21/2017    Acute kidney injury (nontraumatic) (H) Medium  6/21/2017    CHF (congestive heart failure) (H) Medium  7/6/2017    Anemia Medium  7/19/2017    Anemia, iron deficiency Medium  8/9/2017      Code Status History     Date Active Date Inactive Code Status Order ID Comments User Context    8/18/2017 11:32 AM  Full Code [id number]  Lake Pierre MD Outpatient    8/12/2017  4:31 PM 8/18/2017 11:32 AM Full Code [id number]  Gilda Muro MD Inpatient    7/11/2017  3:54 PM 8/12/2017  4:31 PM Full Code [id number]  Damian Contreras MD Outpatient    7/6/2017 10:16 PM 7/11/2017  3:54 PM Full Code [id number]  Maegan Xavier MD Inpatient    6/21/2017  2:14 PM 6/25/2017  3:28 PM Full Code [id number]  Erin Pinedo MD Inpatient    6/15/2017  4:01 PM 6/21/2017  2:14 PM Full Code [id number]  Arash Silva MD Outpatient    6/12/2017  8:17 PM 6/15/2017  4:01 PM Full Code [id number]  Mike Wong MD Inpatient    6/6/2017  1:12 PM 6/12/2017  8:17 PM Full Code [id number]  Kenji Fish MD Outpatient    5/31/2017  9:20 PM 6/6/2017  1:12 PM Full Code [id number]  Mike Wong MD Inpatient    5/2/2017 10:00 AM 5/31/2017  9:20 PM Full Code [id number]  Jian Hartley MD Outpatient    4/28/2017  7:16 PM 5/2/2017 10:00 AM Full Code [id number]  Jian Hartley MD Inpatient    7/6/2016  1:46 PM 4/28/2017  7:16 PM Full Code [id number]  Isadora Mosley PA-C Outpatient    7/6/2016 10:41 AM 7/6/2016  1:46 PM Full Code [id number]  Arash Silva MD Outpatient    7/3/2016 11:12 AM 7/6/2016 10:41 AM Full Code [id number]  Dimitri  Isadora Trejo PA-C Inpatient    7/2/2016  5:08 AM 7/3/2016 11:12 AM Full Code [id number]  Mike Wong MD Inpatient    3/23/2015  7:40 PM 3/25/2015  1:40 PM Full Code [id number]  Luisa Adan MD Inpatient    12/28/2014  9:42 AM 3/23/2015  7:40 PM Full Code [id number]  Jian Hartley MD Outpatient    12/27/2014  4:26 PM 12/28/2014  9:42 AM Full Code [id number]  Mick Dale MD ED    12/9/2014  2:02 PM 12/27/2014  4:26 PM Full Code [id number]  Jian Hartley MD Outpatient    11/13/2014 11:04 AM 12/9/2014  2:02 PM Full Code [id number]  Jian Hartley MD Outpatient    11/10/2014  6:44 PM 11/13/2014 11:04 AM Full Code [id number]  Mike Wong MD Inpatient    9/14/2014  1:12 PM 11/10/2014  6:44 PM Full Code [id number]  Jian Hartley MD Outpatient    9/12/2014  1:02 PM 9/14/2014  1:12 PM Full Code [id number]  Radha Field PA-C Inpatient    7/20/2014  9:02 AM 9/12/2014  1:02 PM Full Code [id number]  Cesar Lafleur MD Outpatient    7/15/2014  5:49 PM 7/20/2014  9:02 AM Full Code [id number]  Radha Field PA-C Inpatient    2/22/2014  4:21 AM 2/22/2014  2:33 PM Full Code [id number]  Mike Ogden MD ED    1/17/2014  8:18 PM 1/18/2014 12:34 PM Full Code [id number]  Judson Gan RN Inpatient    2/1/2013  2:01 PM 1/17/2014  8:18 PM Full Code [id number]  Nadira Mccann MD Outpatient    2/1/2013 12:18 AM 2/1/2013  2:01 PM Full Code [id number]  Rajinder Dwyer MD Inpatient    1/31/2013  9:39 PM 2/1/2013 12:18 AM Full Code [id number]  Arash Silva MD Outpatient    1/31/2013  7:09 PM 1/31/2013  9:39 PM Full Code [id number]  Radha Field PA-C Inpatient    7/10/2008  2:44 PM 1/31/2013  7:09 PM None None HEALTH CARE DIRECTIVE ON FILE 7-3-08 Melina Art Demographics      Current Code Status     Date Active Code Status Order ID Comments User Context       Prior      Summary of Visit     Reason for your hospital stay       Acute kidney injury, urinary tract  infection, cellulitis, venous stasis ulceration                Medication Review      START taking        Dose / Directions Comments    acidophilus tablet   Used for:  Urinary tract infection without hematuria, site unspecified, Cellulitis of lower extremity, unspecified laterality        Dose:  1 tablet   Take 1 tablet by mouth daily   Refills:  0        amoxicillin 500 MG capsule   Commonly known as:  AMOXIL   Indication:  Skin and Soft Tissue Infection, Urinary Tract Infection   Used for:  Urinary tract infection without hematuria, site unspecified        Dose:  500 mg   Start taking on:  8/19/2017   Take 1 capsule (500 mg) by mouth every 24 hours for 2 doses   Quantity:  2 capsule   Refills:  0        cholecalciferol 2000 UNITS tablet   Used for:  Vitamin D deficiency        Dose:  2000 Units   Take 2,000 Units by mouth daily   Quantity:  30 tablet   Refills:  0        levofloxacin 250 MG tablet   Commonly known as:  LEVAQUIN   Indication:  Skin and Soft Tissue Infection, Urinary Tract Infection   Used for:  Urinary tract infection without hematuria, site unspecified, Cellulitis of lower extremity, unspecified laterality        Dose:  250 mg   Start taking on:  8/19/2017   Take 1 tablet (250 mg) by mouth every 48 hours for 1 dose   Quantity:  1 tablet   Refills:  0        pentoxifylline 400 MG CR tablet   Commonly known as:  TRENtal   Used for:  Venous stasis ulcer (H)        Dose:  400 mg   Take 1 tablet (400 mg) by mouth daily   Refills:  0          CONTINUE these medications which may have CHANGED, or have new prescriptions. If we are uncertain of the size of tablets/capsules you have at home, strength may be listed as something that might have changed.        Dose / Directions Comments    calcium acetate 667 MG Caps capsule   Commonly known as:  PHOSLO   This may have changed:  how much to take   Used for:  Chronic kidney disease, unspecified CKD stage        Dose:  667 mg   Take 1 capsule (667 mg) by mouth  3 times daily (with meals)   Quantity:  180 capsule   Refills:  0        gabapentin 300 MG capsule   Commonly known as:  NEURONTIN   This may have changed:  when to take this   Used for:  Diabetic polyneuropathy associated with type 2 diabetes mellitus (H)        Dose:  300 mg   Take 1 capsule (300 mg) by mouth At Bedtime   Quantity:  90 capsule   Refills:  0        sodium bicarbonate 650 MG tablet   This may have changed:  how much to take   Used for:  Chronic kidney disease, unspecified CKD stage        Dose:  1300 mg   Take 2 tablets (1,300 mg) by mouth 3 times daily   Refills:  0          CONTINUE these medications which have NOT CHANGED        Dose / Directions Comments    acetaminophen 325 MG tablet   Commonly known as:  TYLENOL   Used for:  Leg pain, bilateral        Dose:  650 mg   Take 2 tablets (650 mg) by mouth every 4 hours as needed for mild pain   Quantity:  100 tablet   Refills:  0        amLODIPine 5 MG tablet   Commonly known as:  NORVASC   Used for:  Benign essential hypertension        Dose:  10 mg   Take 2 tablets (10 mg) by mouth daily   Quantity:  30 tablet   Refills:  3        aspirin 81 MG tablet   Used for:  Coronary artery disease involving native coronary artery of native heart without angina pectoris        Dose:  81 mg   Take 1 tablet (81 mg) by mouth daily   Quantity:  30 tablet   Refills:  3        calcium carbonate-vitamin D 500-400 MG-UNIT Tabs per tablet   Used for:  Chronic kidney disease, unspecified CKD stage        Dose:  1 tablet   Take 1 tablet by mouth daily   Quantity:  60 tablet   Refills:  0        cyanocobalamin 1000 MCG/ML injection   Commonly known as:  VITAMIN B12   Used for:  Vitamin B12 deficiency (non anemic)        Dose:  1 mL   Inject 1 mL (1,000 mcg) into the muscle every 30 days   Quantity:  1 mL   Refills:  11        ferrous sulfate 325 (65 FE) MG tablet   Commonly known as:  IRON   Used for:  Iron deficiency anemia secondary to inadequate dietary iron intake         Dose:  325 mg   Take 1 tablet (325 mg) by mouth daily (with breakfast)   Quantity:  100 tablet   Refills:  0        metoprolol 50 MG tablet   Commonly known as:  LOPRESSOR   Used for:  Atrial fibrillation with rapid ventricular response (H)        Dose:  25 mg   Take 0.5 tablets (25 mg) by mouth 2 times daily   Quantity:  180 tablet   Refills:  3        nitroGLYcerin 0.4 MG sublingual tablet   Commonly known as:  NITROSTAT   Used for:  Hx of non-ST elevation myocardial infarction (NSTEMI), Atherosclerosis of native coronary artery of native heart without angina pectoris, Postsurgical aortocoronary bypass status        Dose:  0.4 mg   Place 1 tablet (0.4 mg) under the tongue every 5 minutes as needed for chest pain   Quantity:  25 tablet   Refills:  0        order for DME   Used for:  ANABEL (obstructive sleep apnea)        Equipment being ordered: cpap machine, mask, humidifier, tubing and filters   Quantity:  1 Device   Refills:  0        pravastatin 40 MG tablet   Commonly known as:  PRAVACHOL   Used for:  Hx of non-ST elevation myocardial infarction (NSTEMI), Atherosclerosis of native coronary artery of native heart without angina pectoris, Type 2 diabetes mellitus with other circulatory complications (H)        Dose:  40 mg   Take 1 tablet (40 mg) by mouth daily   Quantity:  90 tablet   Refills:  3        traMADol 50 MG tablet   Commonly known as:  ULTRAM   Used for:  Leg pain, bilateral        Dose:   mg   Take 1-2 tablets ( mg) by mouth every 6 hours as needed for pain maximum 3 tablet(s) per day   Quantity:  20 tablet   Refills:  0          STOP taking     furosemide 40 MG tablet   Commonly known as:  LASIX           pramipexole 0.5 MG tablet   Commonly known as:  MIRAPEX                   After Care     Activity - Up with assistive device           Additional Discharge Instructions       - Monitoring of weight and intake/output. If weight increasing by 3 pounds in day or 5 pounds in a week,  then she likely needs to restart lasix (she was on 40mg daily previously), however would discuss this with nephrology       Advance Diet as Tolerated       Follow this diet upon discharge: Orders Placed This Encounter      Renal Diet (non-dialysis)       Daily weights       Call Provider for weight gain of more than 2 pounds per day or 5 pounds per week.       General info for SNF       Length of Stay Estimate: Short Term Care: Estimated # of Days <30  Condition at Discharge: Improving  Level of care:skilled   Rehabilitation Potential: Good  Admission H&P remains valid and up-to-date: Yes  Recent Chemotherapy: N/A  Use Nursing Home Standing Orders: Yes       Intake and output       Every shift       Mantoux instructions       Give two-step Mantoux (PPD) Per Facility Policy Yes       Wound care       Site:   Lower extremity ulcers  Instructions:  cleanse the wound with microklenz moisten gauze pat dry cut a piece of PolyMem roll dressing to fit the size of the wound and place over the wound and cover with / ABD pad and secure with Kerlix and wrap with ACe wraps change dressing daily and as needed if soiled             Radiology & Cardiology Orders     US TCO2 Bilateral       Have you called the [facility] to schedule this exam?     Please call [phone].             Referrals     Medication Therapy Management Referral       Reason for referral:  on more than 5 medications and managing chronic disease    This service is designed to help you get the most from your medications.  A specially trained pharmacist will work closely with you and your doctors  to solve any problems related to your medications and to help you get the   best results from taking them.      The Medication Therapy Management staff will call you to schedule an appointment.       Occupational Therapy Adult Consult       Evaluate and treat as clinically indicated.    Reason:  Deconditioning       Physical Therapy Adult Consult       Evaluate  and treat as clinically indicated.    Reason:  Deconditioning             Follow-Up Appointment Instructions     Follow Up and recommended labs and tests       - BMP in 3-5 days  - Has nephrology follow-up on 8/22/17, she need to go to this appointment  - Dermatology follow-up in 4-6 weeks  - Vascular surgery follow-up, timing to be arranged by vascular surgery  - Please schedule an appointment with primary care provider within a week of discharge from [facility]  - Opthalmology referral placed, needs appointment within a month             Your next 10 appointments already scheduled     Aug 22, 2017  2:00 PM CDT   LAB with LAB FIRST FLOOR [facility])    [address] [place] [id number] [phone]           Patient must bring picture ID. Patient should be prepared to give a urine specimen  Please do not eat 10-12 hours before your appointment if you are coming in fasting for labs on lipids, cholesterol, or glucose (sugar). Pregnant women should follow their Care Team instructions. Water with medications is okay. Do not drink coffee or other fluids. If you have concerns about taking  your medications, please ask at office or if scheduling via [employer], send a message by clicking on Secure Messaging, Message Your Care Team.            Aug 22, 2017  2:30 PM CDT   Return Visit with Vibha Barfield MD   [facility])    [address] [place] [address] [id number] [phone]              Statement of Approval     Ordered          08/18/17 1202  I have reviewed and agree with all the recommendations and orders detailed in this document.  EFFECTIVE NOW     Approved and electronically signed by:  Lake Pierre MD                                                 INTERAGENCY TRANSFER FORM - NURSING   8/12/2017                       [address] [facility] BANK: [phone]            Attending Provider:  "Yareli Lorenzo MD     Allergies:  Ciprofloxacin, Oxycodone, Lisinopril, Penicillins, Sulfa Drugs    Infection:  None   Service:  INTERNAL MED    Ht:  1.626 m (5' 4\")   Wt:  82 kg (180 lb 12.8 oz)   Admission Wt:  83.3 kg (183 lb 11.2 oz)    BMI:  31.03 kg/m 2   BSA:  1.92 m 2            Advance Directives        Does patient have a scanned Advance Directive/ACP document in EPIC?           Yes        Immunizations     Name Date      HepA-Ped 2 dose 10/13/98     HepA-Ped 2 dose 03/11/98     HepB-Peds 09/25/00     HepB-Peds 10/19/99     HepB-Peds 08/10/99     Influenza (High Dose) 3 valent vaccine 09/13/16     Influenza (High Dose) 3 valent vaccine 09/23/15     Influenza (High Dose) 3 valent vaccine 09/24/14     Influenza (High Dose) 3 valent vaccine 12/06/13     Influenza (High Dose) 3 valent vaccine 09/25/12     Influenza (IIV3) 10/01/10     Influenza (IIV3) 10/21/09     Influenza (IIV3) 12/03/08     Influenza (IIV3) 12/06/07     Influenza (IIV3) 04/02/07     Influenza (IIV3) 11/29/06     Influenza (IIV3) 11/03/05     Influenza (IIV3) 10/29/04     Influenza (IIV3) 11/10/03     Influenza (IIV3) 10/16/02     Influenza (IIV3) 11/16/01     Meningococcal (Menomune ) 04/02/07     Pneumococcal (PCV 13) 09/13/16     Pneumococcal 23 valent 11/17/14     Pneumococcal 23 valent 12/03/08     Poliovirus, inactivated (IPV) 04/02/07     TD (ADULT, 7+) 10/31/10     TD (ADULT, 7+) 08/07/00     TD (ADULT, 7+) 06/20/96     Typhoid IM 04/02/07     Yellow Fever 04/02/07     Zoster vaccine, live 11/01/11       ASSESSMENT     Discharge Profile Flowsheet     EXPECTED DISCHARGE     Last Bowel Movement  08/18/17 08/18/17 1047    Expected Discharge Date  08/18/17 (TCU recommended) 08/16/17 1107   COMMUNICATION ASSESSMENT      DISCHARGE NEEDS ASSESSMENT     Patient's communication style  spoken language (English or Bilingual) 08/12/17 0928    Concerns To Be Addressed  compliance issue concerns 07/24/17 0847   FINAL RESOURCES      Concerns " Comments  ? new HD, INR next check 07/07/17 1027   Resources List  Home Care 08/14/17 1205    Equipment Currently Used at Home  walker, rolling;grab bar;wound care supplies;walker, standard;shower chair;dressing device 08/15/17 1507   Other Resources  Home Care 07/24/17 0847    Transportation Available  family or friend will provide (Grand daughter) 08/15/17 1451   Home Care  [facility] [phone], Fax: [phone] 08/14/17 1205    # of Referrals Placed by [facility]  Homecare 06/13/17 1419   PAS Number  [id number] 07/24/17 0847    [facility] Recommendations  TBD after surgery 10/06/16 1312   Senior Linkage Line Referral Placed  07/02/16 07/24/17 0847    Does Patient Need a Referral for Clinic CC  No 03/24/15 1445   F/U Appointment Brochure Provided  07/02/16 07/24/17 0847    Equipment Used at Home  walker, rolling 07/15/14 1946   Existing Resources/Services  Home Care 11/18/14 1451    FUNCTIONAL LEVEL CURRENT     SKIN      Ambulation  1-->assistive equipment 08/18/17 1047   Inspection of bony prominences  Full 08/18/17 1047    Transferring  3-->assistive equipment and person 08/18/17 1047   Full except areas not inspected   -- (BLE covered with dressing) 08/18/17 0347    Toileting  1-->assistive equipment 08/18/17 1047   Skin WDL  ex;characteristics 08/18/17 1047    Bathing  2-->assistive person 08/18/17 1047   Skin Color/Characteristics  bruised (ecchymotic) 08/18/17 1047    Dressing  3-->assistive equipment and person 08/18/17 1047   Skin Temperature  warm 08/18/17 1047    Eating  0-->independent 08/18/17 1047   Skin Moisture  dry 08/18/17 1047    Communication  0-->understands/communicates without difficulty 08/18/17 1047   Skin Integrity  tattoo(s);wound(s);scar(s);scab(s) 08/18/17 1047    Swallowing  0-->swallows foods/liquids without difficulty 08/18/17 1047   SAFETY      Change in Functional Status Since Onset of Current Illness/Injury  yes 08/13/17 1944   Safety WDL  WDL 08/18/17 1042     "GASTROINTESTINAL (ADULT,PEDIATRIC,OB)     Safety Factors  bed in low position;wheels locked;call light in reach;upper side rails raised x 2;ID band on 08/18/17 1047    GI WDL  WDL 08/18/17 1047   Safety Equipment  oxygen flowmeter;suction regulator;suction equipment 08/13/17 2003                 Assessment WDL (Within Defined Limits) Definitions           Safety WDL     Effective: 09/28/15    Row Information: <b>WDL Definition:</b> Bed in low position, wheels locked; call light in reach; upper side rails up x 2; ID band on<br> <font color=\"gray\"><i>Item=AS safety wdl>>List=AS safety wdl>>Version=F14</i></font>      Skin WDL     Effective: 09/28/15    Row Information: <b>WDL Definition:</b> Warm; dry; intact; elastic; without discoloration; pressure points without redness<br> <font color=\"gray\"><i>Item=AS skin wdl>>List=AS skin wdl>>Version=F14</i></font>      Vitals     Vital Signs Flowsheet     VITAL SIGNS     Pain Control  partially effective 08/18/17 0233    Temp  95.7  F (35.4  C) 08/18/17 0547   Functioning  can do most things, but pain gets in the way of some 08/18/17 0233    Temp src  Oral 08/18/17 0547   Sleep  normal sleep 08/18/17 0233    Resp  18 08/18/17 0547   ANALGESIA SIDE EFFECTS MONITORING      Pulse  73 08/17/17 2019   Side Effects Monitoring: Respiratory Quality  R 08/18/17 0233    Heart Rate  76 08/18/17 0547   Side Effects Monitoring: Respiratory Depth  N 08/18/17 0233    Pulse/Heart Rate Source  Monitor 08/18/17 0944   Side Effects Monitoring: Sedation Level  1 08/18/17 0233    BP  151/80 08/18/17 0944   HEIGHT AND WEIGHT      BP Location  Right arm 08/18/17 0944   Weight  82 kg (180 lb 12.8 oz) 08/18/17 1015    OXYGEN THERAPY     POSITIONING      SpO2  92 % 08/18/17 0547   Body Position  supine, head elevated 08/18/17 1047    O2 Device  None (Room air) 08/18/17 0547   Head of Bed (HOB)  HOB at 30 degrees 08/18/17 1047    FiO2 (%)  4 % 08/16/17 0442   Positioning/Transfer Devices  pillows;in " use 08/18/17 1047    PAIN/COMFORT     Chair  Upright in chair 08/18/17 1047    Patient Currently in Pain  yes 08/18/17 0212   DAILY CARE      Preferred Pain Scale  CAPA (Clinically Aligned Pain Assessment) ([facility] Adults Only) 08/18/17 0212   Activity Type  activity adjusted per tolerance 08/18/17 1047    Pain Location  Leg 08/18/17 0212   Activity Level of Assistance  assistance, 1 person (walker) 08/18/17 1047    Pain Orientation  Right;Left 08/18/17 0212   ECG      Pain Descriptors  Burning 08/18/17 0212   ECG Rhythm  Normal sinus rhythm 08/16/17 1400    Pain Intervention(s)  Medication (See eMAR);Repositioned 08/18/17 0212   Ectopy  None 08/16/17 0442    Response to Interventions  Decrease in pain 08/18/17 0233   Lead Monitored  Lead II;V 1 08/13/17 1955    CLINICALLY ALIGNED PAIN ASSESSMENT (CAPA) ([facility] ADULTS ONLY)     POINT OF CARE TESTING      Comfort  tolerable with discomfort 08/18/17 0233   Puncture Site  fingertip 08/16/17 1400    Change in Pain  about the same 08/18/17 0233   Bedside Glucose (mg/dl )   109 mg/dl 08/16/17 0810            Patient Lines/Drains/Airways Status    Active LINES/DRAINS/AIRWAYS     Name: Placement date: Placement time: Site: Days: Last dressing change:    Wound 04/28/17 Lateral;Left Hip Surgical from previous surgery 04/28/17   1542   Hip   111     Wound 04/28/17 Lower;Left Leg Other (comment) scab dime size LLE 04/28/17   1630   Leg   111     Wound 05/31/17 Left Calf Other (comment) open blisters  05/31/17   2011   Calf   78     Wound 06/12/17 Posterior;Right Calf Ulceration 06/12/17   2100   Calf   66     Wound 06/21/17 Left Calf Abrasion(s) 06/21/17   1531   Calf   57     Wound 07/06/17 Anterior Leg Abrasion(s) quarter sized, weaping 07/06/17   2028   Leg   42     Wound 08/12/17 Bilateral Leg Ulceration;Other (comment)  scattered open ulcerations on lower legs bilateral posterior and anterior  08/12/17   1901   Leg   5     Wound  08/12/17 Bilateral Labia excoration, skin intact 08/12/17   1600   Labia   5     Rash 05/31/17 2013 Left breast patch 05/31/17 2013    78             Patient Lines/Drains/Airways Status    Active PICC/CVC     None            Intake/Output Detail Report     Date Intake     Output   Net    Shift P.O. I.V. IV Piggyback Total Urine Stool Total       Day 08/17/17 0000 - 08/17/17 0659 240 -- -- 240 400 -- 400 -160    Janki 08/17/17 0700 - 08/17/17 1459 1240 -- -- 1240 400 -- 400 840    Noc 08/17/17 1500 - 08/17/17 2359 240 -- -- 240 150 575 725 -485    Day 08/18/17 0000 - 08/18/17 0659 -- -- -- -- 250 -- 250 -250    Janki 08/18/17 0700 - 08/18/17 1459 240 -- -- 240 -- -- -- 240      Last Void/BM       Most Recent Value    Urine Occurrence 2 at 08/18/2017 0846    Stool Occurrence 0 at 08/18/2017 0107      Case Management/Discharge Planning     Case Management/Discharge Planning Flowsheet     REFERRAL INFORMATION     Does Patient Need a Referral for Clinic CC  No 03/24/15 1445    Arrived From  home or self-care;home healthcare service 06/13/17 1417   Outpatient/Agency/Support Group Needs  inpatient rehabilitation facility (specify) 06/14/17 1507    # of Referrals Placed by [facility]  Homecare 06/13/17 1419   Equipment Used at Home  walker, rolling 07/15/14 1946    Primary Care Clinic Name  [facility] 06/01/17 1318   FINAL RESOURCES      Primary Care MD Name  Parv[facility] 06/01/17 1318   Equipment Currently Used at Home  walker, rolling;grab bar;wound care supplies;walker, standard;shower chair;dressing device 08/15/17 1507    LIVING ENVIRONMENT     Resources List  Home Care 08/14/17 1205    Lives With  child(zoe), adult;spouse 08/15/17 1451   Other Resources  Home Care 07/24/17 0847    Living Arrangements  house 08/15/17 1451   Home Care  [facility] [phone], Fax: [phone] 08/14/17 1205    Provides Primary Care For  no one 07/06/17 2050   PAS Number  [id number] 07/24/17 0822    COPING/STRESS     Senior Linkage  Line Referral Placed  07/02/16 07/24/17 0847    Major Change/Loss/Stressor  hospitalization 08/13/17 1946   F/U Appointment Brochure Provided  07/02/16 07/24/17 0847    Patient Personal Strengths  motivated;resilient;expressive of emotions;expressive of needs 06/13/17 1419   Existing Resources/Services  Home Care 11/18/14 1451    EXPECTED DISCHARGE     MH/BH CAREGIVER      Expected Discharge Date  08/18/17 (TCU recommended) 08/16/17 1107   Filed Complexity Screen Score  13 08/14/17 0903    ASSESSMENT/CONCERNS TO BE ADDRESSED     ABUSE RISK SCREEN      Concerns To Be Addressed  compliance issue concerns 07/24/17 0847   QUESTION TO PATIENT:  Has a member of your family or a partner(now or in the past) intimidated, hurt, manipulated, or controlled you in any way?  no 08/13/17 1943    Concerns Comments  ? new HD, INR next check 07/07/17 1027   QUESTION TO PATIENT: Do you feel safe going back to the place where you are living?  yes 08/13/17 1943    DISCHARGE PLANNING     OBSERVATION: Is there reason to believe there has been maltreatment of a vulnerable adult (ie. Physical/Sexual/Emotional abuse, self neglect, lack of adequate food, shelter, medical care, or financial exploitation)?  no 08/13/17 1943    Transportation Available  family or friend will provide (Grand daughter) 08/15/17 1451   (R) MENTAL HEALTH SUICIDE RISK      Key Recommendations  TBD after surgery 10/06/16 1312   Are you depressed or being treated for depression?  No 08/13/17 1945                  [address] [facility] [facility]: [phone]            Medication Administration Report for Kathryn Banks as of 08/18/17 1246   Legend:    Given Hold Not Given Due Canceled Entry Other Actions    Time Time (Time) Time  Time-Action       Inactive    Active    Linked        Medications 08/12/17 08/13/17 08/14/17 08/15/17 08/16/17 08/17/17 08/18/17    acetaminophen (TYLENOL) tablet 650 mg  Dose: 650 mg Freq: EVERY 4 HOURS PRN Route: PO  PRN Reason: mild pain  Start:  08/12/17 1859   Admin Instructions: Maximum acetaminophen dose from all sources = 75 mg/kg/day not to exceed 4 grams/day.     2039 (650 mg)-Given         0118 (650 mg)-Given          1846 (650 mg)-Given           Or  acetaminophen (TYLENOL) Suppository 650 mg  Dose: 650 mg Freq: EVERY 4 HOURS PRN Route: RE  PRN Reason: mild pain  Start: 08/12/17 1859   Admin Instructions: Maximum acetaminophen dose from all sources = 75 mg/kg/day not to exceed 4 grams/day.                                    amLODIPine (NORVASC) tablet 10 mg  Dose: 10 mg Freq: DAILY Route: PO  Start: 08/17/17 0800         0823 (10 mg)-Given        0928 (10 mg)-Given           amoxicillin (AMOXIL) capsule 500 mg  Dose: 500 mg Freq: EVERY 24 HOURS Route: PO  Indications of Use: SKIN AND SOFT TISSUE INFECTION,URINARY TRACT INFECTION  Start: 08/17/17 1200   Admin Instructions: Dose adjusted per renal dosing policy.  Estimated CrCl = <10 mL/min.            1415 (500 mg)-Given       1846 (500 mg)-Given        [ ] 1200           aspirin EC EC tablet 81 mg  Dose: 81 mg Freq: DAILY Route: PO  Start: 08/15/17 1415   Admin Instructions: DO NOT CRUSH.        1600 (81 mg)-Given        0828 (81 mg)-Given        0825 (81 mg)-Given        0935 (81 mg)-Given           calcium acetate (PHOSLO) capsule 667 mg  Dose: 667 mg Freq: 3 TIMES DAILY WITH MEALS Route: PO  Start: 08/13/17 1800   End: 08/18/17 1759   Admin Instructions: Best if given with meals. Take with meals.      1950 (667 mg)-Given        0938 (667 mg)-Given       (1226)-Not Given       1841 (667 mg)-Given        0817 (667 mg)-Given       1302 (667 mg)-Given       2019 (667 mg)-Given        0828 (667 mg)-Given       1159 (667 mg)-Given       1827 (667 mg)-Given        0823 (667 mg)-Given       1242 (667 mg)-Given       1846 (667 mg)-Given        0935 (667 mg)-Given       1222 (667 mg)-Given       1759-Med Discontinued       calcium carbonate (OS-MALICK 500 mg Eek. Ca) tablet 1,250 mg  Dose: 1,250 mg Freq:  DAILY Route: PO  Start: 08/16/17 0800   Admin Instructions: Autosub for calcium gluconate.  OS-MALICK 500 = 1250 mg calcium carbonate         0828 (1,250 mg)-Given        0824 (1,250 mg)-Given        0934 (1,250 mg)-Given           cholecalciferol (vitamin D) tablet 2,000 Units  Dose: 2,000 Units Freq: DAILY Route: PO  Start: 08/14/17 1400      1440 (2,000 Units)-Given        0816 (2,000 Units)-Given        0828 (2,000 Units)-Given        0825 (2,000 Units)-Given        0928 (2,000 Units)-Given           ferrous sulfate (IRON) tablet 325 mg  Dose: 325 mg Freq: DAILY Route: PO  Start: 08/13/17 0915   Admin Instructions: Absorbed best on an empty stomach. If stomach upset occurs, can take with meals.      0934 (325 mg)-Given        0939 (325 mg)-Given        0817 (325 mg)-Given        0828 (325 mg)-Given        0823 (325 mg)-Given        0933 (325 mg)-Given           gabapentin (NEURONTIN) capsule 300 mg  Dose: 300 mg Freq: AT BEDTIME Route: PO  Start: 08/12/17 2200    2142 (300 mg)-Given        2136 (300 mg)-Given        2143 (300 mg)-Given        2142 (300 mg)-Given        2216 (300 mg)-Given        2142 (300 mg)-Given        [ ] 2200           glucose 40 % gel 15-30 g  Dose: 15-30 g Freq: EVERY 15 MIN PRN Route: PO  PRN Reason: low blood sugar  Start: 08/13/17 2105   Admin Instructions: Give 15 g for BG 51 to 69 mg/dL IF patient is conscious and able to swallow. Give 30 g for BG less than or equal to 50 mg/dL IF patient is conscious and able to swallow. Do NOT give glucose gel via enteral tube.  IF patient has enteral tube: give apple juice 120 mL (4 oz or 15 g of CHO) via enteral tube for BG 51 to 69 mg/dL.  Give apple juice 240 mL (8 oz or 30 g of CHO) via enteral tube for BG less than or equal to 50 mg/dL.    ~Oral gel is preferable for conscious and able to swallow patient.   ~IF gel unavailable or patient refuses may provide apple juice 120 mL (4 oz or 15 g of CHO). Document juice on I and O flowsheet.               Or  dextrose 50 % injection 25-50 mL  Dose: 25-50 mL Freq: EVERY 15 MIN PRN Route: IV  PRN Reason: low blood sugar  Start: 08/13/17 2105   Admin Instructions: Use if have IV access, BG less than 70 mg/dL and meet dose criteria below:  Dose if conscious and alert (or disorientated) and NPO = 25 mL  Dose if unconscious / not alert = 50 mL  Vesicant.              Or  glucagon injection 1 mg  Dose: 1 mg Freq: EVERY 15 MIN PRN Route: SC  PRN Reason: low blood sugar  PRN Comment: May repeat x 1 only  Start: 08/13/17 2105   Admin Instructions: May give SQ or IM. ONLY use glucagon IF patient has NO IV access AND is UNABLE to swallow AND blood glucose is LESS than or EQUAL to 50 mg/dL.               hydrALAZINE (APRESOLINE) injection 10 mg  Dose: 10 mg Freq: EVERY 4 HOURS PRN Route: IV  PRN Reason: high blood pressure  Start: 08/16/17 0200   Admin Instructions: For systolic >180, or diastolic >100               melatonin tablet 3 mg  Dose: 3 mg Freq: AT BEDTIME PRN Route: PO  PRN Reason: sleep  Start: 08/16/17 2357         0120 (3 mg)-Given        0024 (3 mg)-Given           metoprolol (LOPRESSOR) tablet 25 mg  Dose: 25 mg Freq: 2 TIMES DAILY Route: PO  Start: 08/16/17 2000 2022 (25 mg)-Given        0824 (25 mg)-Given       2019 (25 mg)-Given        0940 (25 mg)-Given       [ ] 2000           miconazole (MICATIN; MICRO GUARD) 2 % powder  Freq: 2 TIMES DAILY PRN Route: Top  PRN Reasons: other,itching  PRN Comment: itching  Start: 08/14/17 0713   Admin Instructions: Apply to [place] area PRN               naloxone (NARCAN) injection 0.1-0.4 mg  Dose: 0.1-0.4 mg Freq: EVERY 2 MIN PRN Route: IV  PRN Reason: opioid reversal  Start: 08/12/17 1625   Admin Instructions: For respiratory rate LESS than or EQUAL to 8.  Partial reversal dose:  0.1 mg titrated q 2 minutes for Analgesia Side Effects Monitoring Sedation Level of 3 (frequently drowsy, arousable, drifts to sleep during conversation).Full reversal dose:  0.4 mg bolus  for Analgesia Side Effects Monitoring Sedation Level of 4 (somnolent, minimal or no response to stimulation).               pentoxifylline (TRENtal) CR tablet 400 mg  Dose: 400 mg Freq: DAILY Route: PO  Start: 08/17/17 0800   Admin Instructions: DO NOT CRUSH. Renally dosed          0824 (400 mg)-Given        0933 (400 mg)-Given           sodium bicarbonate tablet 1,300 mg  Dose: 1,300 mg Freq: 3 TIMES DAILY Route: PO  Start: 08/15/17 1400       1600 (1,300 mg)-Given       2019 (1,300 mg)-Given        0828 (1,300 mg)-Given       1439 (1,300 mg)-Given       2022 (1,300 mg)-Given        0823 (1,300 mg)-Given       1526 (1,300 mg)-Given       2020 (1,300 mg)-Given        0935 (1,300 mg)-Given       [ ] 1400       [ ] 2000           traMADol (ULTRAM) half-tab 25 mg  Dose: 25 mg Freq: EVERY 12 HOURS PRN Route: PO  PRN Reason: moderate pain  Start: 08/12/17 2113     1220 (25 mg)-Given [C]         0502 (25 mg)-Given         0625 (25 mg)-Given        0024 (25 mg)-Given          Future Medications  Medications 08/12/17 08/13/17 08/14/17 08/15/17 08/16/17 08/17/17 08/18/17       lactobacillus rhamnosus (GG) (CULTURELL) capsule 1 capsule  Dose: 1 capsule Freq: DAILY Route: PO  Start: 08/18/17 1600   Admin Instructions: Administer at least 2 hours before or after oral antibiotics. Capsules may be opened.           [ ] 1600           levofloxacin (LEVAQUIN) tablet 250 mg  Dose: 250 mg Freq: EVERY 48 HOURS Route: PO  Indications of Use: SKIN AND SOFT TISSUE INFECTION,URINARY TRACT INFECTION  Start: 08/19/17 1400   Admin Instructions: Administer at least 2 hrs before or 4 hrs after aluminum, calcium, iron, zinc or magnesium containing products.    Dose adjusted per renal dosing policy.  Estimated CrCl = 10-19 mL/min.              Completed Medications  Medications 08/12/17 08/13/17 08/14/17 08/15/17 08/16/17 08/17/17 08/18/17         Dose: 5 mg Freq: ONCE Route: PO  Start: 08/16/17 1130   End: 08/16/17 1159   Admin Instructions:  Give in addition to 5mg already given         1159 (5 mg)-Given               Dose: 60 mg Freq: ONCE Route: IV  Start: 08/15/17 1130   End: 08/15/17 1600   Admin Instructions: Please give after moreira has been placed        1600 (60 mg)-Given                Dose: 10 mg Freq: ONCE Route: PO  Start: 08/16/17 0130   End: 08/16/17 0206        0206 (10 mg)-Given               Dose: 10 mg Freq: ONCE Route: PO  Start: 08/15/17 2000   End: 08/15/17 2019       2019 (10 mg)-Given                Dose: 500 mg Freq: ONCE Route: PO  Indications of Use: SKIN AND SOFT TISSUE INFECTION,URINARY TRACT INFECTION  Start: 08/17/17 1400   End: 08/17/17 1526   Admin Instructions: Administer at least 2 hrs before or 4 hrs after aluminum, calcium, iron, zinc or magnesium containing products.    Dose adjusted per renal dosing policy.  Estimated CrCl = 10-19 mL/min.          1526 (500 mg)-Given           Discontinued Medications  Medications 08/12/17 08/13/17 08/14/17 08/15/17 08/16/17 08/17/17 08/18/17         Dose: 5 mg Freq: DAILY Route: PO  Start: 08/16/17 0800   End: 08/16/17 1115        0837 (5 mg)-Given       1115-Med Discontinued           Dose: 1 g Freq: EVERY 8 HOURS Route: IV  Indications of Use: URINARY TRACT INFECTION  Start: 08/16/17 0930   End: 08/17/17 1128   Admin Instructions: Dose adjusted per renal dosing policy.  Estimated CrCl = 10 mL/min. <br>         (1028)-Not Given       1159 (1 g)-New Bag       2022 (1 g)-New Bag        0458 (1 g)-New Bag       1128-Med Discontinued          Dose: 1,000 mg Freq: DAILY Route: PO  Start: 08/15/17 1915   End: 08/15/17 2100              2100-Med Discontinued            Dose: 500 mg Freq: EVERY 24 HOURS Route: IV  Indications of Use: SKIN AND SOFT TISSUE INFECTION,URINARY TRACT INFECTION  Last Dose: 500 mg (08/17/17 0843)  Start: 08/15/17 0800   End: 08/17/17 1128       1302 (500 mg)-New Bag        0829 (500 mg)-New Bag        0843 (500 mg)-New Bag       1128-Med Discontinued           Dose: 250 mg Freq: DAILY Route: PO  Indications of Use: SKIN AND SOFT TISSUE INFECTION,URINARY TRACT INFECTION  Start: 08/17/17 1130   End: 08/17/17 1143   Admin Instructions: Administer at least 2 hrs before or 4 hrs after aluminum, calcium, iron, zinc or magnesium containing products.                 1143-Med Discontinued          Dose: 12.5 mg Freq: 2 TIMES DAILY Route: PO  Start: 08/15/17 2000   End: 08/16/17 1115 2019 (12.5 mg)-Given        0828 (12.5 mg)-Given       1115-Med Discontinued           Dose: 400 mg Freq: 3 TIMES DAILY WITH MEALS Route: PO  Start: 08/16/17 1800   End: 08/16/17 1826   Admin Instructions: DO NOT CRUSH.         1826-Med Discontinued  (1827)-Not Given               Dose: 1 each Freq: SEE ADMIN INSTRUCTIONS Route: XX  Indications of Use: SKIN AND SOFT TISSUE INFECTION,URINARY TRACT INFECTION  Start: 08/14/17 1144   End: 08/16/17 1008   Admin Instructions: This order is meant to notify providers that this patient is receiving intermittent doses of vancomycin. Do NOT chart on this order.         1008-Med Discontinued      Medications 08/12/17 08/13/17 08/14/17 08/15/17 08/16/17 08/17/17 08/18/17               INTERAGENCY TRANSFER FORM - NOTES (H&P, Discharge Summary, Consults, Procedures, Therapies)   8/12/2017                       [address] [facility]: [phone]               History & Physicals      H&P by Gilda Muro MD at 8/12/2017  6:01 PM     Author:  Gilda Muro MD Service:  General Medicine Author Type:  Resident    Filed:  8/12/2017 11:10 PM Date of Service:  8/12/2017  6:01 PM Creation Time:  8/12/2017  6:00 PM    Status:  Attested :  Gilda Muro MD (Resident)    Cosigner:  Ricco Chung MD at 8/13/2017  7:05 AM        Attestation signed by Ricco Chung MD at 8/13/2017  7:05 AM        Attestation:  This patient has been seen and evaluated by me.  I have discussed care with the housestaff and agree with the findings and plan in their  note. 77 year old female with DM, CKD, CAD, s/p CABG and H/O A-fib s/p ablation transferred from [facility] for worsening renal failure and metabolic acidosis. Presented to ED today for increasing bilateral leg pain, but worse in right leg. Has had leg pain for ~ 1 month. Has had bilat ulcers on the dorsum of her legs that weeks that have been getting worse. Found to be in renal failure with pH of 7.06 and HCO3 of 10 and K of 5.6. Culture leg wounds. Check CK and aldolase and lactate. Will need CT of legs - am concerned about cellulitis and possible deep seeded infection. Will need to have surgery see. Renal aware of patient - will need to get HD.     Ricco Chung  Pulmonary/Critical Care  August 12, 2017 4:54 PM     CCT 45 minutes separate from procedures                                  [facility]      MICU History and Physical  Kathryn Banks MRN: [id number]  1940  Date of Admission:8/12/2017  Primary care provider: Dheeraj Jj      Assessment and Plan:[MG1.1]     Megan Banks is a 78 yo female with CKD stage IV, atrial fibrillation s/p ablation, DM II, CAD, and HTN who presents from [facility] with acute on chronic kidney disease and metabolic acidosis in the setting of suspected cellulitis vs UTI.[MG1.2]     PLAN:  ===NEURO/PSYCH===  # Sedation: None    #Pain:[MG1.1]   -[MG1.2] Tylenol PRN  - W[MG1.1]ill continue home tramadol, but renally dose given visual hallucinations with other opioids and inability to take NSAIDs  - Continue gabapentin[MG1.2]    ===CARDIOVASCULAR===  # Hx of atrial fibrillation s/p ablation 6/[MG1.1]2017: Follows with Dr. King of cardiology and seen last on 8/10. In sinus rhythm, so warfarin discontinued and metoprolol decreased for bradycardia. Currently hemodynamically stable, but in sinus bradycardia.   - Continuous cardiac monitoring overnight  - Hold home metoprolol  - Resume asa tomorrow    # HFpEF: Last echo in 5/31with EF 60-65%, concentric LV  hypertrophy, biatrial enlargement, and elevated RV pressures. History of HF exacerbations secondary to non-compliance with home diuresis. Not clinically hypervolemic on examination, however BNP 46,000 and CT with diffuse anasarca.Some concern for cardiorenal syndrome causing MARTÍN on previous admissions. Hypotensive on presentation to ED, but now hemodynamically stable.   - Will diurese gently overnight given hypotension and electrolytes with plan to restart home diuresis in am if improved  - Monitor daily weights  - Monitor I/O    #HTN:   - Hold Metoprolol  - Hold amlodipine    #CAD s/p CABG x 5  - Resume asa tomorrow    #Likely arterial insufficiency: Hx CAD. Cold extremities with diminished pulses and new ulcerations in pattern consistent with arterial etiology.   - Vascular consult on Monday  - LACI's tomorrow[MG1.2]    ===PULMONARY===  #[MG1.1] Bilateral Small Pulmonary Effusions in the setting of HF and volume overload:  Blunted left costophrenic angle on XR and bilateral pulmonary effusions on CT. Currently breathing comfortably on room air  - Supplemental 02 as needed to maintain saturations > 92%  - Diurese as able[MG1.2]    ===GASTROINTESTINAL===  #[MG1.1] Abdominal Pain and Diarrhea: Ongoing for sometime per previous records. Patient notes intermittent hematochezia, but states secondary to hemorrhoids. CT with mesenteric haziness (edema vs enteritis). WBC and lactate normal, so unlikely to be secondary to ischemic process.   - Monitor  - Tylenol as needed[MG1.2]    # Nutrition:   -[MG1.1] CHO diet[MG1.2]    ===RENAL===  UOP:[MG1.1] 90cc total today with one unmeasured urination[MG1.2]    #[MG1.1] Acute Kidney Injury on CKD stage V: Patient followed as outpatient by Dr. Barfield. CKD thought to be multifactorial and secondary to DM, HTN, renovascular disease, and previous MARTÍN. No evidence of structural kidney disease by ultrasound. Most recent episodes secondary to volume overload and aggressive diureses.  Baseline Cr of 2.3-2.5, on admission today, found to be 5.18.  Unclear etiology of current decompensation, possibly secondary to HF exacerbation vs infection.   - Nephrology consulted, appreciate recommendations  - No acute indication for HD today  - Will obtain access in am in preparation for dialysis    # Metabolic acidosis, non-anion gap: Likely secondary to hyperchloridemia in setting of acute renal failure. Baseline bicarbonate low at 21, so on sodium bicarbonate tabs TID at home. On arrival, pH of 7.06 with bicarbonate of 10 per VBG. Has received 5 amps of bicarb thus with improvement in blood gases. Last Arterial pH 7.26 with bicarbonate of 15.   - Nephrology consulted, appreciate recommendations.   - VBG Q 4 with goal pH>7.2  - Will replace bicarbonate as needed, but avoid gtt given volume overload  - Restart Sodium Bicarbonate tabs  -Likely to dialyze tomorrow    # Hypernatremia, Hyperchloridemia: Chronic in setting of chronic diuresis, however, acutely worsened today in setting of worsening renal failure.   - Avoid fluids if able overnight  - Likely to need dialysis in am    #Intermittent Hematuria: Noted in nephrology progress note. UA today with many RBCS, unclear if secondary to possible UTI in setting of supratherapeutic INR or if structural abnormality.   - Will monitor and consider further imaging if needed.     #Hyperkalemia, resolved: Secondary to acidosis and renal failure. Improved with bicarbonate boluses. EKG sinus bradycardia without peaked T waves  - Monitor[MG1.2]    # Fluids:[MG1.1] None[MG1.2]    ===HEME/ONC===  #[MG1.1] Anemia of Chronic Disease: Baseline hemoglobin of 7.5. On iron supplementation chronically. Hb on arrival of 7.8.   - Monitor hemoglobin  - Check iron studies  - Transfuse if <7[MG1.2]    #Supratherapeutic INR: Patient last reports warfarin taken yesterday despite being discontinued in cardiology clinic 2 days ago .  - Give vitamin K  - Consider FFP prior to procedures  -  INR in am[MG1.3]    ===ENDOCRINE===  #[MG1.1] Type II DM, last A1c 7.3. Not currently on any medications.   - Monitor POCT glucoses[MG1.2]    ===INFECTIOUS DISEASE===  #[MG1.1] Lower extremity Cellulitis: Patient with what appears to be arterial insufficiency ulcerations with superinfection. Initial concern for deep tissue infection given tense nature of extremities with new ulcerations, however CT demonstrating no facial plane air and bilateral edema.  - Surgery consulted, appreciate assistance with wound management  - Wound culture  - Abx as below  - Obtain Blood cultures  -Trend inflammatory markers    #  Incidental gluteal cyst vs abscess  - Monitor for now  - Surgery to evaluate in am whether patient needs I & D    #Possible UTI:  Urinalysis with many white cells, leukocyte esterase, and blood. Unfortunately,sample taken from bed pan, so likely dirty and unable to get adequate culture. No signs of pyelonephritis per CT, but non-contrast.   - Repeat UA when urine output improves  - ABX as below    # Enteritis?: Patient with ongoing diarrhea per previous outpatient provider notes. Complains of mild lower quadrant abdominal pain. CT with mesenteric haziness concerning for enteritis.   - Monitor  - If decompensates, add flagyl (PCN allergy) for coverage of abdominal organisms  - Consider stool culture if diarrhea continued[MG1.2]    # Antimicrobials:[MG1.1]  Vancomycin  Ceftriaxone[MG1.2]    ===SKIN/MSK===  #[MG1.1] Bilateral Ulcerations, appear arterial  - Surgery to provide wound care instructions   - Antibiotics as above.[MG1.2]     LINES:[MG1.1] PIV x 2[MG1.2]    Prophylaxis:  DVT:[MG1.1] Supratherapeutic INR[MG1.3] GI:[MG1.1] None[MG1.2]  Family:[MG1.1]  Updated by nursing[MG1.2]  Disposition:[MG1.1] Stable, but needs ICU observation with consideration of acute dialysis need[MG1.2]  Code Status:[MG1.1] Full[MG1.2]    Patient was seen and discussed with attending physician [MG1.1] Sheldon[MG1.3], who agrees  with above assessment and plan.[MG1.1]    Gilda Muro MD  Internal Medicine- Pediatrics PGY3  [phone][MG1.3]         Chief Complaint:[MG1.1]   Leg pain[MG1.3]         History of Present Illness:[MG1.1]   Kathryn Banks is a 77 year old female with chronic lymphedema and stasis ulcers along with multiple other complex medical problems including coronary artery disease s/p CABG x 5, HFpEF (EF 60-65%), chronic kidney disease stage IV, anemia of chronic disease, type 2 diabetes with circulatory complications, and atrial fibrillation s/p recent ablation who presents from [facility] for acute renal failure, acidosis,  and concern for urgent dialysis need.     Kathryn presented to the ED this morning with intractable leg pain. She has had ongoing ulcerations of her lower extremities for the past month, which she reports were absent before then. In the past several days, the wounds have progressed with increased weeping, number of ulcers, swelling, and redness and her pain had become unmanageable at home. She contacted her primary care physician who prescribed tramadol for her, which did help with her pain.     She does have ongoing diarrhea on chart review and in addition to this has been having some nausea. Despite this, she reports decent oral intake. She denies any associated fevers or ill contacts.     On arrival to the ED, she was noted to be altered and lethargic and mildly hypotensive. Labs were notable for anemia, hypernatremia, hyperkalemia, and hyperchloridemia in addition to elevated creatinine of 5.18. Lactate was normal. BNP was elevated. Venous gas demonstrated acidosis with low bicarbonate. She received four amps of bicarbonate and 500 cc of normal saline prior to transfer to [facility] for consideration of urgent dialysis.[MG1.3]          Review of Systems:[MG1.1]   Denies chest pain, shortness of breath, burning with urination, joint pains, headaches.[MG1.2]          Past Medical History:   Medical  History reviewed.[MG1.1]   Past Medical History:   Diagnosis Date     Carpal tunnel syndrome      Coronary atherosclerosis of native coronary artery 2006    5 vessel CABG     OBSTRUCTIVE SLEEP APNEA     using CPAP     Osteoarthrosis, unspecified whether generalized or localized, unspecified site     generalized arthritis, particularly in her hands and feet         Other and combined forms of senile cataract 2000    Bilateral     Other and unspecified hyperlipidemia      RESTLESS LEGS SYNDROME      TENOSYNOV HAND/WRIST NEC 6/30/2006     Type II or unspecified type diabetes mellitus without mention of complication, not stated as uncontrolled 2001    Diabetes mellitus/pt is diet controlled with weight loss     Typical atrial flutter (H) 01/17/2007    ablation 6/7/2017     Unspecified essential hypertension[MG1.4]              Past Surgical History:   Surgical History reviewed.[MG1.1]   Past Surgical History:   Procedure Laterality Date     ANGIOPLASTY       C APPENDECTOMY       C CABG, VEIN, FIVE  12/06    SVG x 4 and LIMA to LAD     C SOTO W/O FACETEC FORAMOT/DSKC 1/2 VRT SEG, LUMBAR  1968     C REMV CATARACT INTRACAP,INSERT LENS      Bilateral     C TOTAL ABDOM HYSTERECTOMY  1995     - fibroids     C VAGOTOMY/PYLOROPLASTY,SELECT  1970     COLONOSCOPY  12/3/2007     COLONOSCOPY  1/17/2014    Procedure: COLONOSCOPY;  Colonoscopy;  Surgeon: Zack Stearns MD;  Location:  GI     CYSTOSCOPY  11/25/09    [facility]     ENDOSCOPY  03/21/2000    Upper GI     HC COLONOSCOPY THRU STOMA, DIAGNOSTIC  10/7/04    poor prep, repeat in 2-3 years     HC COLONOSCOPY W/WO BRUSH/WASH  10/31/07    Repeat-poor quality prep     HC REMOVAL OF OVARY/TUBE(S)      Salpingo-Oophorectomy, Unilateral     HC REVISE MEDIAN N/CARPAL TUNNEL SURG      Carpal tunnel release     HC UGI ENDOSCOPY DIAG W BIOPSY  10/31/07     HC UGI ENDOSCOPY, SIMPLE EXAM  12/3/2007     OPEN REDUCTION INTERNAL FIXATION HIP NAILING Left 7/3/2016    Procedure: OPEN  REDUCTION INTERNAL FIXATION HIP NAILING;  Surgeon: Lake Schulz MD;  Location:  OR[MG1.4]             Social History:   Social History reviewed.[MG1.1]  Social History   Substance Use Topics     Smoking status: Former Smoker     Years: 10.00     Quit date: 1969     Smokeless tobacco: Never Used     Alcohol use 0.0 oz/week     0 Standard drinks or equivalent per week      Comment: occasional- 2 drinks per month[MG1.4]             Family History:   Family History reviewed.[MG1.1]   Family History   Problem Relation Age of Onset     DIABETES Father      AODM     Neurologic Disorder Father      Parkinson's     Blood Disease Father       from blood clot from leg to lung immediately after hip fracture     DIABETES Paternal Grandmother      adult onset     DIABETES Maternal Grandmother      adult onset     Hypertension No family hx of      CEREBROVASCULAR DISEASE No family hx of[MG1.4]              Allergies:[MG1.1]     Allergies   Allergen Reactions     Ciprofloxacin Nausea and Vomiting     Zofran did not help     Oxycodone Visual Disturbance     Delusions, blackouts      Lisinopril Cough     Penicillins Rash     Sulfa Drugs GI Disturbance     LOSS OF TASTE[MG1.4]             Medications:   Medications Reviewed.[MG1.1]   Current Facility-Administered Medications   Medication     naloxone (NARCAN) injection 0.1-0.4 mg     cefTRIAXone (ROCEPHIN) 2 g vial to attach to  ml bag for ADULTS or NS 50 ml bag for PEDS     vancomycin (VANCOCIN) 2,000 mg in NaCl 0.9 % 500 mL intermittent infusion     vancomycin place maki - receiving intermittent dosing     sodium bicarbonate 8.4 % injection 50 mEq     phytonadione (AQUA-MEPHYTON) 5 mg in NaCl 0.9 % 50 mL intermittent infusion[MG1.4]             Physical Exam:   Vitals were reviewed.[MG1.1]  Blood pressure 120/64, temperature 97.6  F (36.4  C), temperature source Axillary, resp. rate 8, weight 83.3 kg (183 lb 11.2 oz), SpO2 99 %.[MG1.4]    General:[MG1.1]  A[MG1.3]lert, interactive, NAD[MG1.1]. Intermittently confused, but re-directable[MG1.3]  Skin: warm and dry,[MG1.1] no[MG1.3] jaundic[MG1.1]e or rashes. Occasional ecchymoses. Multiple shallow based ulcerations of lower extremities to knees bilaterally with superimposed pain to palpation and erythema .[MG1.3]   HEENT: Atraumatic, MMM, PERRLA, EOMI, neck supple  CV:[MG1.1] Bradycardic[MG1.3], no murmur, rubs, or gallops  Resp: Clear to auscultation bilaterally, no wheezes, crackles  Abd: Soft, non-distended, BS+, no masses appreciated[MG1.1]. Mild TTP over LLQ[MG1.3]  Extremities:[MG1.1] Feet cool to touch. Pulses diminished but palpable. Difficult to appreciate edema given tightness of skin on lower extremities extremities.[MG1.3]   Neuro: alert and oriented, no lateralizing symptoms or focal neurologic deficits  Psych:[MG1.1] Intermittent visual hallucinations.[MG1.3]          Data:        ROUTINE LABS (Last four results)  CMP[MG1.1]  Recent Labs  Lab 08/12/17  1616 08/12/17  1013   * 146*   POTASSIUM 5.2 5.6*   CHLORIDE 125* 124*   CO2 14* 13*   ANIONGAP 10 9   * 136*   BUN 41* 42*   CR 5.07* 5.18*   GFRESTIMATED 8* 8*   GFRESTBLACK 10* 10*   MALICK 6.4* 6.4*   MAG 2.0  --    PHOS 6.6*  --    PROTTOTAL 4.7* 5.0*   ALBUMIN 1.9* 2.1*   BILITOTAL 0.2 0.2   ALKPHOS 216* 228*   AST 32 45   ALT 44 49[MG1.4]     CBC[MG1.1]  Recent Labs  Lab 08/12/17  1616 08/12/17  1013   WBC 7.3 9.4   RBC 3.24* 3.38*   HGB 7.8* 8.2*   HCT 25.9* 27.9*   MCV 80 83   MCH 24.1* 24.3*   MCHC 30.1* 29.4*   RDW 21.6* 22.1*    286[MG1.4]     INR[MG1.1]  Recent Labs  Lab 08/12/17  1616 08/09/17 08/07/17   INR 5.47* 3.8 3.8[MG1.4]     Arterial Blood Gas[MG1.1]  Recent Labs  Lab 08/12/17  1744 08/12/17  1616 08/12/17  1320 08/12/17  1013   PH 7.26*  --   --   --    PCO2 34*  --   --   --    PO2 92  --   --   --    HCO3 15*  --   --   --    O2PER 21.0 21.0 21 21[MG1.4]                Revision History        User Key Date/Time User  Provider Type Action    > MG1.3 8/12/2017 11:10 PM Gilda Muro MD Resident Sign     MG1.2 8/12/2017  9:55 PM Gilda Muro MD Resident      MG1.4 8/12/2017  6:02 PM Gilda Muro MD Resident      MG1.1 8/12/2017  6:00 PM Gilda Muro MD Resident             H&P by Ricco Chung MD at 8/12/2017  5:01 PM     Author:  Ricco Chung MD Service:  Medical ICU Author Type:  Physician    Filed:  8/12/2017  5:01 PM Date of Service:  8/12/2017  5:01 PM Creation Time:  8/12/2017  4:54 PM    Status:  Signed :  Ricco Chung MD (Physician)         MICU Admit Note 8-12-17    MICU Staff      This patient has been seen and evaluated by me.  I have discussed care with the housestaff and agree with the findings and plan in their note. 77 year old female with DM, CKD, CAD, s/p CABG and H/O A-fib s/p ablation transferred from [facility] for worsening renal failure and metabolic acidosis. Presented to ED today for increasing bilateral leg pain, but worse in right leg. Has had leg pain for ~ 1 month. Has had bilat ulcers on the dorsum of her legs that weeks that have been getting worse. Found to be in renal failure with pH of 7.06 and HCO3 of 10 and K of 5.6. Culture leg wounds. Check CK and aldolase and lactate. Will need CT of legs - am concerned about cellulitis and possible deep seeded infection. Will need to have surgery see. Renal aware of patient - will need to get HD.    Ricco Chung  Pulmonary/Critical Care  August 12, 2017 4:54 PM    CCT 45 minutes separate from procedures[CH1.1]       Revision History        User Key Date/Time User Provider Type Action    > CH1.1 8/12/2017  5:01 PM Ricco Chung MD Physician Sign                     Discharge Summaries      Discharge Summaries by Lake Pierre MD at 8/18/2017 11:35 AM     Author:  Lake Pierre MD Service:  General Medicine Author Type:  Resident    Filed:  8/18/2017 12:02 PM Date of Service:  8/18/2017 11:35  AM Creation Time:  8/18/2017 11:35 AM    Status:  Cosign Needed :  Lake Pierre MD (Resident)    Cosign Required:  Yes               [facility] Service - Internal Medicine Discharge Summary   Date of Service: 8/18/2017    Kathryn Banks MRN# [id number]   YOB: 1940 Age: 77 year old     Date of Admission:  8/12/2017  Date of Discharge:  8/18/2017  Admitting Physician:  Ricco Chung MD  Discharge Physician:  Dr. Lorenzo  Discharging Service:  Internal Medicine, [place] [id number]     Primary Provider: Dheeraj Jj         Reason for Admission:   Kathryn Banks is a 77 year old female with a history of CAD s/p CABD x5, HFpEF (60-65%) CKD 4, anemia, Type 2 DM, AFib s/p ablation 6/2017, who presented with acute on chronic kidney disease and metabolic acidosis in the setting of possible cellulitis vs UTI as source.          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 Findings of volume overload, including small bilateral pleural  effusions, diffuse haziness of the mesentery, and diffuse anasarca.  Enteritis is also a diagnostic consideration given the mesentery  findings.  2. No bowel obstruction or features to suggest bowel ischemia on this  noncontrast examination. No pneumatosis or portal venous gas.  3. Mild urinary bladder wall thickening with subtle surrounding  inflammation, correlation with UA could be considered.  4. 5.4 x 6.1 x  9.7 cm simple attenuation fluid collection within the  subcutaneous tissues of the lateral left buttock. Superimposed  infection cannot be excluded.  5. Cholelithiasis.         Consultations:   Nephrology  Vascular surgery  [facility]  Dermatology         Hospital Course by Problem:    # MARTÍN on CKD stage 4  Etiology of MARTÍN not entirely clear, however initially looked prerenal from sepsis. However, there may have also been a component of volume overload with weight up, so cardiorenal also possible. She was diuresed with lasix and is close to dry weight. Acidosis improving with sodium bicarb. Electrolytes look good. No acute need for HD.   - Nephrology f/u on 8/22/17  - Repeat BMP in 3-5 days  - Monitor I&Os and daily weights. If weight increases, then may need to restart lasix    # Sepsis 2/2 UTI vs cellulitis  # Bilateral LE cellulitis  # Urinary tract infection, uncomplicated  Had intermittent low BPs earlier in admission, along with end organ damage (MARTÍN). Urine cx growing E. Coli, Enterococcus, and Citrobacter. LE wound culture growing pseudomonas, serratia, enterococcus, coag negative staph, and klebsiella. CT of leg was negative for presence of deep tissue infection Was on zosyn and vancomycin (discontinued 8/16) initially transitioned to cefepime and ampicillin (discontinued 8/17), then transitioned to levofloxacin and amoxicillin.   - Continue levofloxacin  - Continue amoxicillin    # Lower extremity ulcers  Etiology likely venous stasis ulcer. Wound care was following, dermatology also evaluated patient.  There was some concern for arterial insufficiency, so vascular surgery was consulted. ABIs 8/16 however vessels non-compressable so toe-brachial index was done showed no mircovascular disease.   - Vascular surgery follow-up for outpatient TCO2 study  - Antibiotics as above given likelihood of infected ulcers  - Tramadol and acetaminophen PRN for pain  - Dermatology f/u in 4-6 weeks  - Continue pentoxifylline       # Hypertension  Home doses of blood pressure medications were restarted 8/17 with stable blood pressures.  - continue PTA amlodipine and metoprolol    # Non-anion gap metabolic acidosis   On admission pH was 7.06. Initially there was concern that Kathryn would need dialysis, however with treatment of the infection with antibiotics, along with sodium bicarbonate, her pH has improved, most recently 7.3. Her baseline CO2 is 21.   - Continue sodium bicarbonate 1300 TID    # Vertical Diplopia   Patient with lens implants so with fluid shifts, this could be why she is having blurry vision or the implanted lenses could have shifted. This diplopia has been present for months and is unchanged here in the hospital.  - Opthalmology appointment as outpatient for further evaluation of implanted lenses    # HFpEF EF 60-65%  # CAD s/p CABD x5  Last echo was 5/31 with EF 60-65%, concentric LV hypertrophy, biatrial enlargement, and elevated RV pressures. BNP was ~ 40k on admission, however with her renal failure this makes it difficult to determine if exacerbation of heart failure.  - Daily weights and intake/output  - Continue holding lasix for now    # Hx of AFib s/p ablation 6/2017  Follows w/ Dr. King of cardiology, last seen on 8/10/17, at which time warfarin was stopped. EKG with sinus rhythm.   - continue PTA metoprolol 25 mg BID    # Small bilateral pleural effusions   No problems breathing, however small bilateral pleural effusions on CT. Likely etiology from MARTÍN and HFpEF. 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 Last HgbA1c 7.3, which is good control for her age and comorbidities  - Monitor  - Continue gabapentin for neuropathy    # Secondary hyperparathyroidism  # Vitamin D deficiency  , phos 6.1, Ca ionized 3.9, vit D 15  - Continue phoslo  - Continue cholecalciferol 2,000 units qday    # Chronic normocytic Anemia  Hgb 7.5 - 8 while here.  "Baseline 8-9 most recently. Does not appear overly iron deficient based on last iron panel. May have some degree of anemia from CKD  - Continue iron supplements  - F/u with nephrology    # Chronic diarrhea, unspecified  Per patient is chronic, with some blood from hemmheroids. Negative for c diff here. Diarrhea maybe somewhat worsened in the setting of antibiotics  - Continue probiotic, can stop 3-4 days after antibiotics are finished   - Could consider loperamide or other anti-diarrheal, if needed     # Gluteal cyst  Incidental finding on imaging, most likely cyst. General surgery evaluated patient and no indication for I&D    Physical Exam on day of Discharge:  Vital signs:[JS1.1]  Temp: 95.7  F (35.4  C) Temp src: Oral BP: 151/80 Pulse: 73 Heart Rate: 76 Resp: 18 SpO2: 92 % O2 Device: None (Room air)     Weight: 82 kg (180 lb 12.8 oz)  Estimated body mass index is 31.03 kg/(m^2) as calculated from the following:    Height as of 8/10/17: 1.626 m (5' 4\").    Weight as of this encounter: 82 kg (180 lb 12.8 oz).[JS1.2]    General: alert and no distress, laying in bed  Head: NC/AT  Eyes: non-icteric sclera, EOMI  Pulmonary: CTAB  CV: RRR, systolic ejection murmur 2/6  GI: soft, non-tender, non-distended + bowel sounds  Skin: Her shins and lower legs were covered in dressings/ACE wraps.   MSK: no deformities  EXT: Bilateral LE edema  Neuro: alert and oriented         Pending Results:   None         Discharge Medications:[JS1.1]     Current Discharge Medication List      START taking these medications    Details   levofloxacin (LEVAQUIN) 250 MG tablet Take 1 tablet (250 mg) by mouth every 48 hours for 1 dose  Qty: 1 tablet, Refills: 0    Associated Diagnoses: Urinary tract infection without hematuria, site unspecified; Cellulitis of lower extremity, unspecified laterality      calcium acetate (PHOSLO) 667 MG CAPS capsule Take 1 capsule (667 mg) by mouth 3 times daily (with meals)  Qty: 180 capsule    Associated " Diagnoses: Chronic kidney disease, unspecified CKD stage      pentoxifylline (TRENTAL) 400 MG CR tablet Take 1 tablet (400 mg) by mouth daily    Associated Diagnoses: Venous stasis ulcer (H)      amoxicillin (AMOXIL) 500 MG capsule Take 1 capsule (500 mg) by mouth every 24 hours for 2 doses  Qty: 2 capsule, Refills: 0    Associated Diagnoses: Urinary tract infection without hematuria, site unspecified      cholecalciferol 2000 UNITS tablet Take 2,000 Units by mouth daily  Qty: 30 tablet    Associated Diagnoses: Vitamin D deficiency      Lactobacillus (ACIDOPHILUS) tablet Take 1 tablet by mouth daily    Associated Diagnoses: Urinary tract infection without hematuria, site unspecified; Cellulitis of lower extremity, unspecified laterality         CONTINUE these medications which have CHANGED    Details   traMADol (ULTRAM) 50 MG tablet Take 1-2 tablets ( mg) by mouth every 6 hours as needed for pain maximum 3 tablet(s) per day  Qty: 20 tablet, Refills: 0    Associated Diagnoses: Leg pain, bilateral      acetaminophen (TYLENOL) 325 MG tablet Take 2 tablets (650 mg) by mouth every 4 hours as needed for mild pain  Qty: 100 tablet    Associated Diagnoses: Leg pain, bilateral      aspirin 81 MG tablet Take 1 tablet (81 mg) by mouth daily  Qty: 30 tablet, Refills: 3    Associated Diagnoses: Coronary artery disease involving native coronary artery of native heart without angina pectoris      sodium bicarbonate 650 MG tablet Take 2 tablets (1,300 mg) by mouth 3 times daily    Associated Diagnoses: Chronic kidney disease, unspecified CKD stage      nitroGLYcerin (NITROSTAT) 0.4 MG sublingual tablet Place 1 tablet (0.4 mg) under the tongue every 5 minutes as needed for chest pain  Qty: 25 tablet, Refills: 0    Associated Diagnoses: Hx of non-ST elevation myocardial infarction (NSTEMI); Atherosclerosis of native coronary artery of native heart without angina pectoris; Postsurgical aortocoronary bypass status      gabapentin  (NEURONTIN) 300 MG capsule Take 1 capsule (300 mg) by mouth At Bedtime  Qty: 90 capsule    Associated Diagnoses: Diabetic polyneuropathy associated with type 2 diabetes mellitus (H)      pravastatin (PRAVACHOL) 40 MG tablet Take 1 tablet (40 mg) by mouth daily  Qty: 90 tablet, Refills: 3    Associated Diagnoses: Hx of non-ST elevation myocardial infarction (NSTEMI); Atherosclerosis of native coronary artery of native heart without angina pectoris; Type 2 diabetes mellitus with other circulatory complications (H)      metoprolol (LOPRESSOR) 50 MG tablet Take 0.5 tablets (25 mg) by mouth 2 times daily  Qty: 180 tablet, Refills: 3    Associated Diagnoses: Atrial fibrillation with rapid ventricular response (H)      amLODIPine (NORVASC) 5 MG tablet Take 2 tablets (10 mg) by mouth daily  Qty: 30 tablet, Refills: 3    Associated Diagnoses: Benign essential hypertension      cyanocobalamin (VITAMIN B12) 1000 MCG/ML injection Inject 1 mL (1,000 mcg) into the muscle every 30 days  Qty: 1 mL, Refills: 11    Associated Diagnoses: Vitamin B12 deficiency (non anemic)      ferrous sulfate (IRON) 325 (65 FE) MG tablet Take 1 tablet (325 mg) by mouth daily (with breakfast)  Qty: 100 tablet    Associated Diagnoses: Iron deficiency anemia secondary to inadequate dietary iron intake      calcium carbonate-vitamin D 500-400 MG-UNIT TABS per tablet Take 1 tablet by mouth daily  Qty: 60 tablet    Associated Diagnoses: Chronic kidney disease, unspecified CKD stage         CONTINUE these medications which have NOT CHANGED    Details   ORDER FOR DME Equipment being ordered: cpap machine, mask, humidifier, tubing and filters  Qty: 1 Device, Refills: 0    Associated Diagnoses: ANABEL (obstructive sleep apnea)         STOP taking these medications       furosemide (LASIX) 40 MG tablet Comments:   Reason for Stopping:         pramipexole (MIRAPEX) 0.5 MG tablet Comments:   Reason for Stopping:[JS1.2]                    Discharge Instructions and  Follow-Up:[JS1.1]     Discharge Procedure Orders  US TCO2 Bilateral   Standing Status: Future  Standing Exp. Date: 08/16/18   Order Comments: Have you called the [facility] to schedule this exam?     Please call [phone].   Order Specific Question Answer Comments   Reason for exam: non-healing wounds      Home care nursing referral   Referral Type: Home Health Therapies & Aides     Medication Therapy Management Referral   Referral Type: Med Therapy Management     MD face to face encounter   Order Comments: Documentation of Face to Face and Certification for Home Health Services    I certify that patient: Kathryn Banks is under my care and that I, or a nurse practitioner or physician's assistant working with me, had a face-to-face encounter that meets the physician face-to-face encounter requirements with this patient on: 8/14/2017.    This encounter with the patient was in whole, or in part, for the following medical condition, which is the primary reason for home health care: complex medical status.    I certify that, based on my findings, the following services are medically necessary home health services: Nursing.    My clinical findings support the need for the above services because: Nurse is needed: assessment and treatment, medication management, VS.    Further, I certify that my clinical findings support that this patient is homebound (i.e. absences from home require considerable and taxing effort and are for medical reasons or [employer] services or infrequently or of short duration when for other reasons) because: Requires assistance of another person or specialized equipment to access medical services because patient: Requires supervision of another for safe transfer...    Based on the above findings. I certify that this patient is confined to the home and needs intermittent skilled nursing care, physical therapy and/or speech therapy.  The patient is under my care, and I have initiated the  establishment of the plan of care.  This patient will be followed by a physician who will periodically review the plan of care.  Physician/Provider to provide follow up care: Dheeraj Jj    Attending hospital physician (the Medicare certified [place] provider):Dr. Rajan Epperson  Physician Signature: See electronic signature associated with these discharge orders.  Date: 8/14/2017     General info for SNF   Order Comments: Length of Stay Estimate: Short Term Care: Estimated # of Days <30  Condition at Discharge: Improving  Level of care:skilled   Rehabilitation Potential: Good  Admission H&P remains valid and up-to-date: Yes  Recent Chemotherapy: N/A  Use Nursing Home Standing Orders: Yes     Mantoux instructions   Order Comments: Give two-step Mantoux (PPD) Per Facility Policy Yes     Reason for your hospital stay   Order Comments: Acute kidney injury, urinary tract infection, cellulitis, venous stasis ulceration     Intake and output   Order Comments: Every shift     Daily weights   Order Comments: Call Provider for weight gain of more than 2 pounds per day or 5 pounds per week.     Wound care   Order Comments: Site:   Lower extremity ulcers  Instructions:  cleanse the wound with microklenz moisten gauze pat dry cut a piece of PolyMem roll dressing to fit the size of the wound and place over the wound and cover with / ABD pad and secure with Kerlix and wrap with ACe wraps change dressing daily and as needed if soiled     Additional Discharge Instructions   Order Comments: - Monitoring of weight and intake/output. If weight increasing by 3 pounds in day or 5 pounds in a week, then she likely needs to restart lasix (she was on 40mg daily previously), however would discuss this with nephrology     Activity - Up with assistive device   Order Specific Question Answer Comments   Is discharge order? Yes      Follow Up and recommended labs and tests   Order Comments: - BMP in 3-5 days  - Has nephrology follow-up on  8/22/17, she need to go to this appointment  - Dermatology follow-up in 4-6 weeks  - Vascular surgery follow-up, timing to be arranged by vascular surgery  - Please schedule an appointment with primary care provider within a week of discharge from TCU  - Opthalmology referral placed, needs appointment within a month     Full Code     Physical Therapy Adult Consult   Order Comments: Evaluate and treat as clinically indicated.    Reason:  Deconditioning     Occupational Therapy Adult Consult   Order Comments: Evaluate and treat as clinically indicated.    Reason:  Deconditioning     Advance Diet as Tolerated   Order Comments: Follow this diet upon discharge: Orders Placed This Encounter     Renal Diet (non-dialysis)   Order Specific Question Answer Comments   Is discharge order? Yes[JS1.2]              Discharge Disposition:   TCU         Condition on Discharge:   Discharge condition: Stable   Code status on discharge: Full Code        Date of service: 8/18/2017     35 minutes spent in discharge, including >50% in counseling and coordination of care, medication review and plan of care recommended on follow up.     Patient staffed with Dr. Lorenzo[JS1.1]                      Revision History        User Key Date/Time User Provider Type Action    > JS1.2 8/18/2017 12:02 PM Lake Pierre MD Resident Sign     JS1.1 8/18/2017 11:35 AM Lake Pierre MD Resident                      Consult Notes      Consults by Crow Solano MD at 8/16/2017  3:32 PM     Author:  Crow Solano MD Service:  Dermatology Author Type:  Resident    Filed:  8/16/2017  3:54 PM Date of Service:  8/16/2017  3:32 PM Creation Time:  8/16/2017  3:31 PM    Status:  Cosign Needed :  Crow Solano MD (Resident)    Cosign Required:  Yes         Consult Orders:    1. Dermatology IP Consult: Patient to be seen: Routine - within 24 hours; Call back #: [phone]; New ulcers over lower legs for past couple months that are now  infected, appears like venous insufficiency uclers. Vascular surgery requesting derm evalu... [[id number]] ordered by Lake Pierre MD at 08/16/17 1114                [facility] Inpatient Consult Dermatology Note    Impression/Plan:  1. Lipodermatosclerosis: The patient's history and examination are consistent with lipodermatosclerosis with ulceration, which represents late stage chronic venous stasis. The patient's ulcers appear shallow with fibrinous/granulation tissue at the base, suggestive of a venous origin. The lack of pain and purpura argue against calciphylaxis. Biopsy was considered to rule this out. However, given low suspicion and the likelihood of creating an additional poorly healing wound with biopsy, this was deferred. Rather, would advise treating with conservative measures including elevation and compression. Pentoxifylline is also helpful in lipodermatosclerosis, starting lower and titrating up to doses up to 800 mg TID.     Keep legs elevated above the heart as much as possible    Agree with gentle compression with ACE    Advise beginning pentoxifylline, starting at 400 mg TID. May titrate up as tolerated (primarily GI upset)    Ongoing treatment of wound infection per primary. Could consider topical treatment with gentian violet if coming off IV antibiotics  Please have patient follow up in dermatology clinic 4-6 weeks after discharge      Thank you for the dermatology consultation. Please do not hesitate to contact the dermatology resident/faculty on call for any additional questions or concerns. We will continue to follow.     Crow Solano MD  Dermatology resident, PGY-4  [phone]       Dermatology Problem List:  1. Lipodermatosclerosis    Date of Admission:[SO1.1] Aug 12, 2017[SO1.2]   Encounter Date: 8/16/2017     Reason for Consultation:   Non-healing ulcers, consider lipodermatosclerosis    History of Present Illness:  Ms. Kathryn Banks is a 77 year old  female with a history of chronic lower extremity edema, CAD s/p CABG, CHF, CKD, atrial fibrillation, and diabetes mellitus who was admitted 8/12/17 for acute kidney injury and volume overload with sepsis and suspected origin lower extremity ulcers vs UTI. Dermatology was consulted regarding concern for possible calciphylaxis as the origin of her lower extremity ulcers. The patient notes that these ulcers have been present for a long time and they are non-tender. She thinks they may be slowly healing, but is unsure. She is otherwise feeling well and has no other skin concerns at this time.     Past Medical History:[SO1.1]   Patient Active Problem List   Diagnosis     Restless leg syndrome     Sleep apnea     Lipoma of other skin and subcutaneous tissue     ESOPHAGEAL REFLUX     Postsurgical aortocoronary bypass status     Iron deficiency anemia     History of peptic ulcer disease     Edema     Kidney stone     Hyperlipidemia LDL goal <100     Advanced directives, counseling/discussion     CAD - NSTEMI, CABG x 5 2007, angio in 2013 with patent grafts other than occluded graft to PL     Hx of non-ST elevation myocardial infarction (NSTEMI)     Health Care Home     Lymphedema     Anemia of chronic renal failure, stage 4 (severe) (H)     Renal failure     Osteoarthritis of hip     Non morbid obesity, unspecified obesity type     Type 2 diabetes mellitus with other circulatory complications (H)     Long-term (current) use of anticoagulants [Z79.01]     Acute renal failure with tubular necrosis (H)     Essential hypertension with goal blood pressure less than 140/90     Atrial fibrillation with rapid ventricular response (H)     Rash - redness medial thighs     Metabolic acidosis, normal anion gap (NAG)     Pulmonary hypertension (H)     Chronic heart failure (H) with preserved EF     Atrial flutter (H) - present 6/12     Generalized edema - anasarca     Hypertensive heart and chronic kidney disease with heart failure and  stage 1 through stage 4 chronic kidney disease, or unspecified chronic kidney disease (H)     Supratherapeutic INR     Proteinuria 2.1 g/g Cr     Bradycardia     Acute on chronic renal failure (H)     Memory loss     Chronic atrial fibrillation (H) on 6/12-6/13     Blood in stool     Microscopic hematuria     Acute kidney injury (nontraumatic) (H)     CHF (congestive heart failure) (H)     Anemia     Anemia, iron deficiency     Acidosis     Past Medical History:   Diagnosis Date     Carpal tunnel syndrome      Coronary atherosclerosis of native coronary artery 2006    5 vessel CABG     OBSTRUCTIVE SLEEP APNEA     using CPAP     Osteoarthrosis, unspecified whether generalized or localized, unspecified site     generalized arthritis, particularly in her hands and feet         Other and combined forms of senile cataract 2000    Bilateral     Other and unspecified hyperlipidemia      RESTLESS LEGS SYNDROME      TENOSYNOV HAND/WRIST NEC 6/30/2006     Type II or unspecified type diabetes mellitus without mention of complication, not stated as uncontrolled 2001    Diabetes mellitus/pt is diet controlled with weight loss     Typical atrial flutter (H) 01/17/2007    ablation 6/7/2017     Unspecified essential hypertension      Past Surgical History:   Procedure Laterality Date     ANGIOPLASTY       C APPENDECTOMY       C CABG, VEIN, FIVE  12/06    SVG x 4 and LIMA to LAD     C SOTO W/O FACETEC FORAMOT/DSKC 1/2 VRT SEG, LUMBAR  1968     C REMV CATARACT INTRACAP,INSERT LENS      Bilateral     C TOTAL ABDOM HYSTERECTOMY  1995     - fibroids     C VAGOTOMY/PYLOROPLASTY,SELECT  1970     COLONOSCOPY  12/3/2007     COLONOSCOPY  1/17/2014    Procedure: COLONOSCOPY;  Colonoscopy;  Surgeon: Zack Stearns MD;  Location:  GI     [facility]  11/25/09    [facility]     ENDOSCOPY  03/21/2000    Upper GI     HC COLONOSCOPY THRU STOMA, DIAGNOSTIC  10/7/04    poor prep, repeat in 2-3 years     HC COLONOSCOPY W/WO BRUSH/WASH  10/31/07     Repeat-poor quality prep     HC REMOVAL OF OVARY/TUBE(S)      Salpingo-Oophorectomy, Unilateral     HC REVISE MEDIAN N/CARPAL TUNNEL SURG      Carpal tunnel release     HC UGI ENDOSCOPY DIAG W BIOPSY  10/31/07     HC UGI ENDOSCOPY, SIMPLE EXAM  12/3/2007     OPEN REDUCTION INTERNAL FIXATION HIP NAILING Left 7/3/2016    Procedure: OPEN REDUCTION INTERNAL FIXATION HIP NAILING;  Surgeon: Lake Schulz MD;  Location:  OR[SO1.2]         Social History:  The patient does not work.     Family History:  There is no family history of melanoma.    Medications:[SO1.1]  Current Facility-Administered Medications   Medication     hydrALAZINE (APRESOLINE) injection 10 mg     ampicillin (OMNIPEN) 1 g vial to attach to  ml bag for ADULTS or 50 ml bag for PEDS     [START ON 8/17/2017] amLODIPine (NORVASC) tablet 10 mg     metoprolol (LOPRESSOR) tablet 25 mg     ceFEPIme (MAXIPIME) 500 mg in NaCl 0.9 % 100 mL intermittent infusion     sodium bicarbonate tablet 1,300 mg     aspirin EC EC tablet 81 mg     calcium carbonate (OS-MALICK 500 mg [place]) tablet 1,250 mg     miconazole (MICATIN; MICRO GUARD) 2 % powder     cholecalciferol (vitamin D) tablet 2,000 Units     ferrous sulfate (IRON) tablet 325 mg     calcium acetate (PHOSLO) capsule 667 mg     glucose 40 % gel 15-30 g    Or     dextrose 50 % injection 25-50 mL    Or     glucagon injection 1 mg     naloxone (NARCAN) injection 0.1-0.4 mg     acetaminophen (TYLENOL) tablet 650 mg    Or     acetaminophen (TYLENOL) Suppository 650 mg     gabapentin (NEURONTIN) capsule 300 mg     traMADol (ULTRAM) half-tab 25 mg          Allergies   Allergen Reactions     Ciprofloxacin Nausea and Vomiting     Zofran did not help     Oxycodone Visual Disturbance     Delusions, blackouts      Lisinopril Cough     Penicillins Rash     Sulfa Drugs GI Disturbance     LOSS OF TASTE[SO1.2]         Review of Systems:  -As per HPI      Physical exam:  Vitals:[SO1.1] /72 (BP Location: Right  arm)  Pulse 76  Temp 96.3  F (35.7  C) (Oral)  Resp 18  Wt 86.1 kg (189 lb 12.8 oz)  SpO2 96%  BMI 32.58 kg/m2[SO1.2]  GEN: This is a well developed, well-nourished female in no acute distress, in a pleasant mood.    SKIN: Focused examination of the bilateral lower extremities was performed.  - Bilateral lower extremities with inverted champagne bottle appearance  - Tense edema throughout bilateral lower extremities  - Multiple shallow ulcers located over the bilateral lower legs, predominantly over the anterior shins. All examined ulcers have fibrinoid/granulation tissue at the base.  - Background of light brown hyperpigmentation over the bilateral lower legs  - No purpura identified  -No other lesions of concern on areas examined.     Laboratory:[SO1.1]  Results for orders placed or performed during the hospital encounter of 08/12/17 (from the past 24 hour(s))   Glucose by meter   Result Value Ref Range    Glucose 220 (H) 70 - 99 mg/dL   Blood gas venous   Result Value Ref Range    Ph Venous 7.21 (L) 7.32 - 7.43 pH    PCO2 Venous 48 40 - 50 mm Hg    PO2 Venous 26 25 - 47 mm Hg    Bicarbonate Venous 19 (L) 21 - 28 mmol/L    Base Deficit Venous 8.5 mmol/L    FIO2 21.0    Calcium ionized whole blood   Result Value Ref Range    Calcium Ionized Whole Blood 4.0 (L) 4.4 - 5.2 mg/dL   Vancomycin level   Result Value Ref Range    Vancomycin Level 29.9 (HH) mg/L   Glucose by meter   Result Value Ref Range    Glucose 244 (H) 70 - 99 mg/dL   Blood gas venous   Result Value Ref Range    Ph Venous 7.28 (L) 7.32 - 7.43 pH    PCO2 Venous 42 40 - 50 mm Hg    PO2 Venous 69 (H) 25 - 47 mm Hg    Bicarbonate Venous 20 (L) 21 - 28 mmol/L    Base Deficit Venous 6.4 mmol/L    FIO2 4L    Basic metabolic panel   Result Value Ref Range    Sodium 147 (H) 133 - 144 mmol/L    Potassium 3.8 3.4 - 5.3 mmol/L    Chloride 118 (H) 94 - 109 mmol/L    Carbon Dioxide 18 (L) 20 - 32 mmol/L    Anion Gap 11 3 - 14 mmol/L    Glucose 114 (H) 70 - 99  mg/dL    Urea Nitrogen 42 (H) 7 - 30 mg/dL    Creatinine 5.42 (H) 0.52 - 1.04 mg/dL    GFR Estimate 8 (L) >60 mL/min/1.7m2    GFR Estimate If Black 9 (L) >60 mL/min/1.7m2    Calcium 6.4 (L) 8.5 - 10.1 mg/dL   CBC with platelets   Result Value Ref Range    WBC 7.4 4.0 - 11.0 10e9/L    RBC Count 3.01 (L) 3.8 - 5.2 10e12/L    Hemoglobin 7.4 (L) 11.7 - 15.7 g/dL    Hematocrit 24.4 (L) 35.0 - 47.0 %    MCV 81 78 - 100 fl    MCH 24.6 (L) 26.5 - 33.0 pg    MCHC 30.3 (L) 31.5 - 36.5 g/dL    RDW 22.4 (H) 10.0 - 15.0 %    Platelet Count 207 150 - 450 10e9/L   Magnesium   Result Value Ref Range    Magnesium 1.8 1.6 - 2.3 mg/dL   Phosphorus   Result Value Ref Range    Phosphorus 5.7 (H) 2.5 - 4.5 mg/dL   Calcium ionized whole blood   Result Value Ref Range    Calcium Ionized Whole Blood 4.0 (L) 4.4 - 5.2 mg/dL   Albumin level   Result Value Ref Range    Albumin 1.6 (L) 3.4 - 5.0 g/dL   Calcium ionized whole blood   Result Value Ref Range    Calcium Ionized Whole Blood 3.8 (L) 4.4 - 5.2 mg/dL   Vancomycin level   Result Value Ref Range    Vancomycin Level 29.8 (HH) mg/L   Glucose by meter   Result Value Ref Range    Glucose 109 (H) 70 - 99 mg/dL   Glucose by meter   Result Value Ref Range    Glucose 142 (H) 70 - 99 mg/dL     *Note: Due to a large number of results and/or encounters for the requested time period, some results have not been displayed. A complete set of results can be found in Results Review.[SO1.2]       Dr. Chris Pond staffed the patient.    Staff Involved:  Resident(Crow Abbott)/Staff(as above)  Crow Solano MD  Dermatology resident, PGY-4  [phone][SO1.1]            Revision History        User Key Date/Time User Provider Type Action    > SO1.2 8/16/2017  3:54 PM Crow Solano MD Resident Sign     SO1.1 8/16/2017  3:31 PM Crow Solano MD Resident             Consults by Emy Armas APRN CNP at 8/15/2017  2:28 PM     Author:  Emy Armas APRN CNP Service:  Vascular Surgery Author Type:   Nurse Practitioner    Filed:  8/16/2017 10:43 AM Date of Service:  8/15/2017  2:28 PM Creation Time:  8/15/2017  2:28 PM    Status:  Cosign Needed Addendum :  Emy Armas APRN CNP (Nurse Practitioner)    Cosign Required:  Yes         Consult Orders:    1. Vascular Surgery Adult IP Consult: Patient to be seen: Routine within 24 hrs; Call back #: [phone]; Came in with painful ulcers that appear infected. Abnormal ABIs, concern for arterial insufficiency and ulcers related to this; Consultant may ent... [[id number]] ordered by Lake Pierre MD at 08/15/17 5926                VASCULAR SURGERY CONSULTATION NOTE    HPI: Mrs. Banks is a 77- year female who was transferred to [facility] from an outside hospital on 8/14/2017 for evaluation and treatment of diarrhea and acidosis. Vascular Surgery was consulted for bilateral lower extremity ulcers of 1-3 months duration.     REFERRAL SOURCE: Dr. Pierre    PAST MEDICAL HISTORY:  Past Medical History:   Diagnosis Date     Carpal tunnel syndrome      Coronary atherosclerosis of native coronary artery 2006    5 vessel CABG     OBSTRUCTIVE SLEEP APNEA     using CPAP     Osteoarthrosis, unspecified whether generalized or localized, unspecified site     generalized arthritis, particularly in her hands and feet         Other and combined forms of senile cataract 2000    Bilateral     Other and unspecified hyperlipidemia      RESTLESS LEGS SYNDROME      TENOSYNOV HAND/WRIST NEC 6/30/2006     Type II or unspecified type diabetes mellitus without mention of complication, not stated as uncontrolled 2001    Diabetes mellitus/pt is diet controlled with weight loss     Typical atrial flutter (H) 01/17/2007    ablation 6/7/2017     Unspecified essential hypertension        PAST SURGICAL HISTORY:  Past Surgical History:   Procedure Laterality Date     ANGIOPLASTY       C APPENDECTOMY       C CABG, VEIN, FIVE  12/06    SVG x 4 and LIMA to LAD     C SOTO W/O FACETEC FORAMOT/DSKC   VRT SEG, LUMBAR  1968     C REMV CATARACT INTRACAP,INSERT LENS      Bilateral     C TOTAL ABDOM HYSTERECTOMY       - fibroids     C VAGOTOMY/PYLOROPLASTY,SELECT  1970     COLONOSCOPY  12/3/2007     COLONOSCOPY  2014    Procedure: COLONOSCOPY;  Colonoscopy;  Surgeon: Zack Stearns MD;  Location:  GI     CYSTOSCOPY  09    [facility]     ENDOSCOPY  2000    Upper GI     HC COLONOSCOPY THRU STOMA, DIAGNOSTIC  10/7/04    poor prep, repeat in 2-3 years     HC COLONOSCOPY W/WO BRUSH/WASH  10/31/07    Repeat-poor quality prep     HC REMOVAL OF OVARY/TUBE(S)      Salpingo-Oophorectomy, Unilateral     HC REVISE MEDIAN N/CARPAL TUNNEL SURG      Carpal tunnel release     HC UGI ENDOSCOPY DIAG W BIOPSY  10/31/07     HC UGI ENDOSCOPY, SIMPLE EXAM  12/3/2007     OPEN REDUCTION INTERNAL FIXATION HIP NAILING Left 7/3/2016    Procedure: OPEN REDUCTION INTERNAL FIXATION HIP NAILING;  Surgeon: Lake Schulz MD;  Location:  OR       FAMILY HISTORY:  Family History   Problem Relation Age of Onset     DIABETES Father      AODM     Neurologic Disorder Father      Parkinson's     Blood Disease Father       from blood clot from leg to lung immediately after hip fracture     DIABETES Paternal Grandmother      adult onset     DIABETES Maternal Grandmother      adult onset     Hypertension No family hx of      CEREBROVASCULAR DISEASE No family hx of        SOCIAL HISTORY:   Social History   Substance Use Topics     Smoking status: Former Smoker     Years: 10.00     Quit date: 1969     Smokeless tobacco: Never Used     Alcohol use 0.0 oz/week     0 Standard drinks or equivalent per week      Comment: occasional- 2 drinks per month       TOBACCO USE:  Non-smoker, lifelong    FUNCTIONAL CAPACITY: Lives in her own home in [place] with her , granddaughter, and 2- friends. Her limiting factor with exercise is lightheadedness. She denies classic rest pain and claudication.     HOME  MEDICATIONS:  Prior to Admission medications    Medication Sig Start Date End Date Taking? Authorizing Provider   traMADol (ULTRAM) 50 MG tablet Take 1-2 tablets ( mg) by mouth every 6 hours as needed for pain maximum 3 tablet(s) per day 8/11/17   Dheeraj Jj MD   metoprolol (LOPRESSOR) 50 MG tablet Take 0.5 tablets (25 mg) by mouth 2 times daily 8/10/17   Thaddeus King MD   aspirin 81 MG tablet Take 1 tablet (81 mg) by mouth daily 8/10/17   Thaddeus King MD   amLODIPine (NORVASC) 5 MG tablet Take 2 tablets (10 mg) by mouth daily 8/10/17   Thaddeus King MD   sodium bicarbonate 650 MG tablet Take 1 tablet (650 mg) by mouth 3 times daily 7/28/17   Dheeraj Jj MD   calcium acetate (PHOSLO) 667 MG CAPS capsule Take 2 capsules (1,334 mg) by mouth 3 times daily (with meals) 7/11/17   Damian Contreras MD   furosemide (LASIX) 40 MG tablet Take 1 tablet (40 mg) by mouth daily 7/11/17   Damian Contreras MD   pravastatin (PRAVACHOL) 40 MG tablet Take 1 tablet (40 mg) by mouth daily 6/28/17   Dheeraj Jj MD   acetaminophen (TYLENOL) 325 MG tablet Take 2 tablets (650 mg) by mouth every 4 hours as needed for mild pain 6/15/17   Arash Silva MD   nitroglycerin (NITROSTAT) 0.4 MG sublingual tablet Place 1 tablet (0.4 mg) under the tongue every 5 minutes as needed for chest pain  Patient not taking: Reported on 8/10/2017 6/15/17   Arash Silva MD   gabapentin (NEURONTIN) 300 MG capsule Take 1 capsule (300 mg) by mouth At Bedtime  Patient taking differently: Take 300 mg by mouth 2 times daily  6/15/17   Arash Silva MD   pramipexole (MIRAPEX) 0.5 MG tablet Take 3 tablets (1.5 mg) by mouth every evening 6/15/17   Arash Silva MD   ferrous sulfate (IRON) 325 (65 FE) MG tablet Take 1 tablet (325 mg) by mouth daily (with breakfast) 6/15/17   Arash Silva MD   calcium carbonate-vitamin D 500-400 MG-UNIT TABS per tablet Take 1 tablet by mouth daily 6/15/17   Arash Silva MD   cyanocobalamin (VITAMIN  B12) 1000 MCG/ML injection Inject 1 mL (1,000 mcg) into the muscle every 30 days 3/31/17 3/31/18  Dheeraj Jj MD   ORDER FOR DME Equipment being ordered: cpap machine, mask, humidifier, tubing and filters 3/4/14   Dheeraj Jj MD       VITAL SIGNS:  Temp: 97.8  F (36.6  C) Temp src: Oral BP: 168/75 Pulse: 76 Heart Rate: 75 Resp: 16 SpO2: 95 % O2 Device: None (Room air)      189 lbs 12.8 oz    PHYSICAL EXAM:  NEURO/PSYCH:  The patient is alert and oriented.  Appropriate.  Moves all extremities.   SKIN: Color is appropriate for race, warm, dry.  PULMONARY:  Does not require supplemental oxygen; no acute distress.  EXTREMITIES: Generalized lower extremity edema; appears chronic. Bilateral lower legs with scattered ulcers; venous stasis skin changes present. Non-malodorous, ulcerations are non-fluctuant, and do not appear grossly infected. No ulcers present on feet or toes. Palpable left dorsalis pedis, no palpable right distal pulses. No palpable bilateral femoral pulses. Extremities are warm and appear well-perfused.     8/15/2017 LACI RESULTS:  Right:       Arm: 190 mmHg   PT at ankle: Noncompressible   DP at foot: 227 mmHg   LACI: 1.19   TBI: 0.97     Toe PPG: Normal     Left:      Arm: 180 mmHg   PT at ankle: 214 mmHg   DP at foot: 217 mmHg   LACI: 1.14   TBI: 0.64     Toe PPG: Normal    ASSESSMENT/PLAN:  #1 Ulcers, bilateral lower legs, likely secondary to venous stasis, diabetes mellitus, and atherosclerosis   #2 Diabetes mellitus, unknown medication or control  #3 Status post coronary artery bypass grafting  #4 Chronic kidney disease, likely stage V, not on dialysis   - toe pressures will help us further categorize the wounds  - does not appear to be straight forward peripheral artery disease  - her renal function would only allow for CO studies, no contrast  - at this time she is not acutely threatened; her main worry is amputation, which I do not think will be Dr. Cardenas's recommendation at this time  -  acute threats include acute arterial occlusion and life-threatening infection, which I do not suspect at this time  - Dr. Cardenas will see later today or tomorrow  - wound care as previously ordered     ANTI-PLATELET: Aspirin  ANTI-COAGULANT: Not warranted  STATIN: On pravastatin; need to consider high-dose statin therapy  DIABETES MEDICATIONS: Unknown  TOBACCO CESSATION: Non-smoker    Please contact Dr. Cardenas's Vascular Surgery Service with questions or concerns.    RHODA Pope, CNP  Division of Vascular Surgery  [facility]  Pager: [phone][SM1.1]             Revision History        User Key Date/Time User Provider Type Action    > [N/A] 8/16/2017 10:43 AM Emy Armas APRN CNP Nurse Practitioner Addend     SM1.1 8/15/2017  2:48 PM Emy Armas APRN CNP Nurse Practitioner Sign            Consults by Warren Savage MD at 8/12/2017  8:35 PM     Author:  Warren Savage MD Service:  Nephrology Author Type:  Fellow    Filed:  8/12/2017 10:28 PM Date of Service:  8/12/2017  8:35 PM Creation Time:  8/12/2017  8:08 PM    Status:  Attested :  Warren Savage MD (Fellow)    Cosigner:  Jennie Cervantes MD at 8/13/2017  7:12 PM         Consult Orders:    1. Nephrology General Adult IP Consult: Acidosis; Consultant may enter orders: Yes [[id number]] ordered by Gilda Muro MD at 08/12/17 1650           Attestation signed by Jennie Cervantes MD at 8/13/2017  7:12 PM        I have seen and evaluated the patient. Discussed with the fellow and agree with fellow's findings and plan as documented in the fellow's note.  I have reviewed today's vital signs, notes, medications, labs and imaging.   Jennie Cervantes MD                                       Nephrology Initial Consult  August 12, 2017      Kathryn Banks MRN:[id number] YOB: 1940  Date of Admission:8/12/2017  Primary care provider: Dheeraj Jj  Requesting physician: Ricco Chung  MD Lm    ASSESSMENT AND RECOMMENDATIONS:[MA1.1]   MARTÍN on CKD cr baseline is 3-4, now 5, no uremic symptoms, no indication for acute HD today, cont bicarb to correct her nongap metabolic acidosis to keep PH >7.2. Monitor I&O, monitor UOP. She might need HD in the future ok to start an access tunneled would be ideal since this is going to be long term.      HTN and volume stable avoid hypotension, use Lasix to manage volume overload, if not responsive to Lasix might need HD. Signs of volume overload BNP is elevated could be due to her renal failure but consider repeating Echo.     Metabolic acidosis non gap due to CKD, cont bicarb to keep PH >7.2    Electrolyte K 5.6 hyperkalemia cont bicarb, trial of Lasix, Ca 6.4, albumin is 1.9. it would be 8.4 after correction with albumin     Anemia of chronic disease, check iron store, transfuse PRBC if <7, monitor for now.     Recommendations were communicated to primary team via phone and in person    Patient was seen and discussed with Dr. Cervantes.  Warren Savage  Nephrology Fellow  Pager: [phone]      REASON FOR CONSULT: metabolic acidosis, CKD and MARTÍN    HISTORY OF PRESENT ILLNESS:  Kathryn Banks is a 77-year-old female who presented to the ER for lower extremities pain and open sores for evaluations. She had legs ulcer for a long time but now it becomes very painful and also more leg swelling and erythematous.  She was known to have CKD stage 4 and she follow up with Dr Barfield last time was seen in July after hospital admission and MARTÍN. Her CKD is related to her chronic comorbidity and her Type 2 DM, she also has CAD with CABG x 5 , chronic atrial fibrillation, HTN, restless leg syndrome, pulmonary HTN, diastolic heart failure, anemia, her serum creatinine was running between 3 and 4 since July 2017. She has had multiple MARTÍN episodes in the past with CKD since ~2013.  Today in the ER her Cr was up to 5.1, she wasn't urinating since am and her bicarb is 13,  nongap metabolic acidosis, she received multiple doses of bicarbs, her VBG improved from 7.0 to 7.22.  At this time pt is awake not in any distress, no uremic symptoms, she denies any nausea or vomiting, still complaining of leg pain, no c/p or sob, no fever no chills or sweating, no diarrhea.[MA1.2]                             PAST MEDICAL HISTORY:  Reviewed with patient on[MA1.1] 08/12/2017     Past Medical History:   Diagnosis Date     Carpal tunnel syndrome      Coronary atherosclerosis of native coronary artery 2006    5 vessel CABG     OBSTRUCTIVE SLEEP APNEA     using CPAP     Osteoarthrosis, unspecified whether generalized or localized, unspecified site     generalized arthritis, particularly in her hands and feet         Other and combined forms of senile cataract 2000    Bilateral     Other and unspecified hyperlipidemia      RESTLESS LEGS SYNDROME      TENOSYNOV HAND/WRIST NEC 6/30/2006     Type II or unspecified type diabetes mellitus without mention of complication, not stated as uncontrolled 2001    Diabetes mellitus/pt is diet controlled with weight loss     Typical atrial flutter (H) 01/17/2007    ablation 6/7/2017     Unspecified essential hypertension        Past Surgical History:   Procedure Laterality Date     ANGIOPLASTY       C APPENDECTOMY       C CABG, VEIN, FIVE  12/06    SVG x 4 and LIMA to LAD     C SOTO W/O FACETEC FORAMOT/[facility] 1/2 VRT SEG, LUMBAR  1968     C REMV CATARACT INTRACAP,INSERT LENS      Bilateral     C TOTAL ABDOM HYSTERECTOMY  1995     - fibroids     C VAGOTOMY/PYLOROPLASTY,SELECT  1970     COLONOSCOPY  12/3/2007     COLONOSCOPY  1/17/2014    Procedure: COLONOSCOPY;  Colonoscopy;  Surgeon: Zack Stearns MD;  Location:  GI     CYST[facility]  11/25/09    [facility]     ENDOSCOPY  03/21/2000    Upper GI     HC COLONOSCOPY THRU STOMA, DIAGNOSTIC  10/7/04    poor prep, repeat in 2-3 years     HC COLONOSCOPY W/WO BRUSH/WASH  10/31/07    Repeat-poor quality prep     HC REMOVAL OF  OVARY/TUBE(S)      Salpingo-Oophorectomy, Unilateral     HC REVISE MEDIAN N/CARPAL TUNNEL SURG      Carpal tunnel release     HC UGI ENDOSCOPY DIAG W BIOPSY  10/31/07     HC UGI ENDOSCOPY, SIMPLE EXAM  12/3/2007     OPEN REDUCTION INTERNAL FIXATION HIP NAILING Left 7/3/2016    Procedure: OPEN REDUCTION INTERNAL FIXATION HIP NAILING;  Surgeon: Lake Schulz MD;  Location:  OR[MA1.3]        MEDICATIONS:  PTA Meds  Prior to Admission medications    Medication Sig Last Dose Taking? Auth Provider   traMADol (ULTRAM) 50 MG tablet Take 1-2 tablets ( mg) by mouth every 6 hours as needed for pain maximum 3 tablet(s) per day   Dheeraj Jj MD   metoprolol (LOPRESSOR) 50 MG tablet Take 0.5 tablets (25 mg) by mouth 2 times daily   Thaddeus King MD   aspirin 81 MG tablet Take 1 tablet (81 mg) by mouth daily   Thaddeus King MD   amLODIPine (NORVASC) 5 MG tablet Take 2 tablets (10 mg) by mouth daily   Thaddeus King MD   sodium bicarbonate 650 MG tablet Take 1 tablet (650 mg) by mouth 3 times daily   Dheeraj Jj MD   calcium acetate (PHOSLO) 667 MG CAPS capsule Take 2 capsules (1,334 mg) by mouth 3 times daily (with meals)   Damian Contreras MD   furosemide (LASIX) 40 MG tablet Take 1 tablet (40 mg) by mouth daily   Damian Contreras MD   pravastatin (PRAVACHOL) 40 MG tablet Take 1 tablet (40 mg) by mouth daily   Dheeraj Jj MD   acetaminophen (TYLENOL) 325 MG tablet Take 2 tablets (650 mg) by mouth every 4 hours as needed for mild pain   Arash Silva MD   nitroglycerin (NITROSTAT) 0.4 MG sublingual tablet Place 1 tablet (0.4 mg) under the tongue every 5 minutes as needed for chest pain  Patient not taking: Reported on 8/10/2017   Arash Silva MD   gabapentin (NEURONTIN) 300 MG capsule Take 1 capsule (300 mg) by mouth At Bedtime  Patient taking differently: Take 300 mg by mouth 2 times daily    Arash Silva MD   pramipexole (MIRAPEX) 0.5 MG tablet Take 3 tablets (1.5 mg) by mouth every evening    Arash Silva MD   ferrous sulfate (IRON) 325 (65 FE) MG tablet Take 1 tablet (325 mg) by mouth daily (with breakfast)   Arash Silva MD   calcium carbonate-vitamin D 500-400 MG-UNIT TABS per tablet Take 1 tablet by mouth daily   Arash Silva MD   cyanocobalamin (VITAMIN B12) 1000 MCG/ML injection Inject 1 mL (1,000 mcg) into the muscle every 30 days   Dheeraj Jj MD   ORDER FOR DME Equipment being ordered: cpap machine, mask, humidifier, tubing and filters   Dheeraj Jj MD      Current Meds[MA1.1]    cefTRIAXone  2 g Intravenous Q24H     vancomycin place maki - receiving intermittent dosing  1 each Does not apply See Admin Instructions     sodium bicarbonate  50 mEq Intravenous Once     furosemide  20 mg Intravenous Once     gabapentin  300 mg Oral At Bedtime[MA1.3]     Infusion Meds       ALLERGIES:[MA1.1]    Allergies   Allergen Reactions     Ciprofloxacin Nausea and Vomiting     Zofran did not help     Oxycodone Visual Disturbance     Delusions, blackouts      Lisinopril Cough     Penicillins Rash     Sulfa Drugs GI Disturbance     LOSS OF TASTE[MA1.3]       REVIEW OF SYSTEMS:  A 10 point review of systems was negative except as noted above.    SOCIAL HISTORY:[MA1.1]   Social History     Social History     Marital status:      Spouse name: Urbano Banks     Number of children: 2     Years of education: 13     Occupational History     Ministry coordinator      [employer]     Social History Main Topics     Smoking status: Former Smoker     Years: 10.00     Quit date: 1/1/1969     Smokeless tobacco: Never Used     Alcohol use 0.0 oz/week     0 Standard drinks or equivalent per week      Comment: occasional- 2 drinks per month     Drug use: No     Sexual activity: Yes     Birth control/ protection: Surgical     Other Topics Concern     Parent/Sibling W/ Cabg, Mi Or Angioplasty Before 65f 55m? No     Social History Narrative[MA1.3]     Reviewed with patient      FAMILY MEDICAL HISTORY:[MA1.1]   Family History   Problem Relation Age of Onset     DIABETES Father      AODM     Neurologic Disorder Father      Parkinson's     Blood Disease Father       from blood clot from leg to lung immediately after hip fracture     DIABETES Paternal Grandmother      adult onset     DIABETES Maternal Grandmother      adult onset     Hypertension No family hx of      CEREBROVASCULAR DISEASE No family hx of[MA1.3]      Reviewed with patient     PHYSICAL EXAM:   Temp  Av.5  F (36.4  C)  Min: 96.2  F (35.7  C)  Max: 98.2  F (36.8  C)      Pulse  Av.4  Min: 49  Max: 98 Resp  Av.5  Min: 8  Max: 18  SpO2  Av.3 %  Min: 90 %  Max: 100 %[MA1.1]       /63  Temp 97.6  F (36.4  C) (Axillary)  Resp 14  Wt 83.3 kg (183 lb 11.2 oz)  SpO2 94%  BMI 31.53 kg/m2[MA1.3]   Date 17 0700 - 17 0659   Shift 2787-3781 9728-8053 5102-4800 24 Hour Total   I  N  T  A  K  E   I.V.  800  800    Shift Total  (mL/kg)  800  (9.6)  800  (9.6)   O  U  T  P  U  T   Urine  90  90    Shift Total  (mL/kg)  90  (1.08)  90  (1.08)   Weight (kg)  83.32 83.32 83.32        Admit Weight: 83.3 kg (183 lb 11.2 oz)     GENERAL APPEARANCE: no distress,  awake  EYES: no scleral icterus, pupils equal  HENT: NC/AT,  mouth  without ulcers or lesions  Lymphatics: no cervical or supraclavicular LAD  Pulmonary: lungs clear to auscultation with equal breath sounds bilaterally, no clubbing  CV: regular rhythm, normal rate, no rub   - JVP no   - Edema 2+reagan   GI: soft, nontender, normal bowel sounds, no HSM   MS: no evidence of inflammation in joints, no muscle tenderness  : no Cee,   SKIN: no rash, warm, dry, no cyanosis  NEURO: mentation intact and speech normal    LABS:   CMP[MA1.1]  Recent Labs  Lab 17  1616 17  1013   * 146*   POTASSIUM 5.2 5.6*   CHLORIDE 125* 124*   CO2 14* 13*   ANIONGAP 10 9   * 136*   BUN 41* 42*   CR 5.07* 5.18*   GFRESTIMATED 8* 8*   GFRESTBLACK  10* 10*   MALICK 6.4* 6.4*   MAG 2.0  --    PHOS 6.6*  --    PROTTOTAL 4.7* 5.0*   ALBUMIN 1.9* 2.1*   BILITOTAL 0.2 0.2   ALKPHOS 216* 228*   AST 32 45   ALT 44 49[MA1.3]     CBC[MA1.1]  Recent Labs  Lab 08/12/17  1616 08/12/17  1013   HGB 7.8* 8.2*   WBC 7.3 9.4   RBC 3.24* 3.38*   HCT 25.9* 27.9*   MCV 80 83   MCH 24.1* 24.3*   MCHC 30.1* 29.4*   RDW 21.6* 22.1*    286[MA1.3]     INR[MA1.1]  Recent Labs  Lab 08/12/17  1616 08/09/17 08/07/17   INR 5.47* 3.8 3.8[MA1.3]     ABG[MA1.1]  Recent Labs  Lab 08/12/17  1744 08/12/17  1616 08/12/17  1320 08/12/17  1013   PH 7.26*  --   --   --    PCO2 34*  --   --   --    PO2 92  --   --   --    HCO3 15*  --   --   --    O2PER 21.0 21.0 21 21[MA1.3]      URINE STUDIES[MA1.1]  Recent Labs   Lab Test  08/12/17   1800  07/07/17   1600  06/22/17   0119  06/21/17   0850   07/02/16   0224  02/05/13   1016  01/31/13   1210  01/22/13   0921   COLOR  Yellow  Yellow  Yellow  Light Yellow   < >  Yellow  Yellow  Yellow  Yellow   APPEARANCE  Cloudy  Slightly Cloudy  Slightly Cloudy  Slightly Cloudy   < >  Clear  Clear  Clear  Clear   URINEGLC  Negative  Negative  Negative  Negative   < >  Negative  >=1000*  >=1000*  500*   URINEBILI  Negative  Negative  Negative  Negative   < >  Negative  Negative  Negative  Negative   URINEKETONE  Negative  Negative  Negative  Negative   < >  Negative  Negative  Negative  Negative   SG  1.015  1.009  1.010  1.009   < >  1.020  1.020  1.025  1.020   UBLD  Large*  Small*  Trace*  Trace*   < >  Trace*  Negative  Negative  Small*   URINEPH  5.5  5.5  5.0  5.0   < >  6.0  6.0  6.0  6.0   PROTEIN  100*  30*  10*  10*   < >  Negative  Trace*  Trace*  30*   UROBILINOGEN   --    --    --    --    --   0.2  0.2  0.2  0.2   NITRITE  Negative  Negative  Negative  Negative   < >  Negative  Positive*  Positive*  Negative   LEUKEST  Large*  Moderate*  Large*  Large*   < >  Negative  Small*  Trace*  Moderate*   RBCU  >182*  11*  4*  2-5*   < >  2-5*  O - 2  2-5*   10-25*   WBCU  2568*  7*  136*  *   < >  O - 2  *  *  >100*    < > = values in this interval not displayed.     Recent Labs   Lab Test  07/17/17   1107  06/21/17   0848  06/14/17   1455  06/01/17   2045   UTPG  1.29*  0.89*  1.31*  2.11*[MA1.3]     PTH[MA1.1]  Recent Labs   Lab Test  07/17/17   1054  06/21/17   0842   PTHI  726*  567*[MA1.3]     IRON STUDIES[MA1.1]  Recent Labs   Lab Test  07/17/17   1054  06/21/17   0842  06/14/17   1750  08/20/12   1146  06/07/12   1350   IRON  23*  36  26*  32*  15*   FEB  145*  209*  229*  285  323   IRONSAT  16  17  11*  11*  5*   GWEN  206  196  87   --   7*           Warren Savage MD[MA1.3]       Revision History        User Key Date/Time User Provider Type Action    > MA1.2 8/12/2017 10:28 PM Warren Savage MD Fellow Sign     MA1.3 8/12/2017  8:09 PM Warren Savage MD Fellow      MA1.1 8/12/2017  8:08 PM Warren Savage MD Fellow             Consults by David Jansen MD at 8/12/2017  6:22 PM     Author:  David Jansen MD Service:  Surgery Author Type:  Resident    Filed:  8/13/2017 11:51 AM Date of Service:  8/12/2017  6:22 PM Creation Time:  8/12/2017  6:22 PM    Status:  Attested :  David Jansen MD (Resident)    Cosigner:  Arash Stratton MD at 8/13/2017  4:34 PM         Consult Orders:    1. Surgery General Adult IP Consult: Patient to be seen: Routine within 24 hrs; Call back #: [phone]; 76 yo female with CKD stage 4, presents with new leg ulcerations with concern for deep tissue infection.; Consultant may enter orders: Yes [[id number]] ordered by Gilda Muro MD at 08/12/17 6200           Attestation signed by Arash Stratton MD at 8/13/2017  4:34 PM        Physician Attestation     I, Arash Stratton, have reviewed and discussed with the advanced practice provider the patient's history, their physical exam findings and the Trauma care plan for this patient at 9:45 today. I did participate in a shared visit by  interviewing or examining the patient today. I did personally see this patient today.     Arash Stratton MD, PhD                               General Surgery Consult Note  Staff: Dr. Stratton  Date: 8/12/2017   Consulted for: Leg ulcers by Dr. Muro    Assessment/Plan:  77 year old female with[MB1.1] CAD s/o CABG x5, ANABEL on CPAP,[MB1.2] CKD, DM, HTN, HLD, afib on warfarin s/p ablation with arterial insufficiency ulcers that may be superinfected with cellulitis. Patient's clinical course not consistent with necrotizing soft tissue infection. No indication for operative intervention at this time.[MB1.3]  -[MB1.1] LACI's tomorrow  - Recommend vascular surgery consult on Monday  - will use vascular dressing for wounds: daily PCMX wash, place xeroform on wounds, then ABDs, then lightly wrap kerlex, and finally lightly wrap ACE  - Elevate legs as much as possible  - Agree with current abx for cellulitis. If clinically worsens, could consider switching to merrem  - General surgery to follow.[MB1.2]    Discussed with Amy Fish and Viral Garland MD  General Surgery PGY-3[MB1.1]    Addenda  DOS: 8/13/2017    Patient seen and examined this am. Chronic bilateral leg ulcers from venous insufficiency that have worsened symptomatically prompting hospitalization. Also with new MARTÍN in setting of CKD. Suspect some component of arterial insufficiency that has not completed work up. No signs of end digit ischemia on exam. Minimal erythema on today's exam. No pain with active dorsi/plantar flexion. Do not suspect necrotizing soft tissue infection based on aforementioned findings. Normal wbc and afebrile. I do not think she needs antibiotics.[CM1.1] Would not recommend drainage of gluteal cyst.[CM1.2] Will defer antibiotics to primary.[CM1.1] Recommend WOCN and vascular surgery consult in am.[CM1.2] Surgery will sign off. Please call if questions.[CM1.3]      Seen with staff, Dr. Stratton[CM1.4]  David Jansen MD  Surgery  PGY-5  [phone][CM1.5]    ------------------------------------------  HPI:   Kathryn Banks is a 77 year old female with[MB1.1] CAD s/o CABG x5, ANABEL on CPAP,[MB1.2] CKD, DM, HTN, HLD, afib on warfarin s/p ablation with arterial insufficiency ulcers that may be superinfected with cellulitis[MB1.3].[MB1.1]  She has had the ulcers for 1 month and her PCP has been managing them with a home health RN[MB1.3]. Denies trauma. The wounds have not been open, but now they are open, red, swollen, and painful. She also endorses 1-2 months of toe numbness. Denies fevers, chill, CP, SOB, or abdominal pain. He last at when he got to the ED.[MB1.4]  ?  PMH:[MB1.1]  Past Medical History:   Diagnosis Date     Carpal tunnel syndrome      Coronary atherosclerosis of native coronary artery 2006    5 vessel CABG     OBSTRUCTIVE SLEEP APNEA     using CPAP     Osteoarthrosis, unspecified whether generalized or localized, unspecified site     generalized arthritis, particularly in her hands and feet         Other and combined forms of senile cataract 2000    Bilateral     Other and unspecified hyperlipidemia      RESTLESS LEGS SYNDROME      TENOSYNOV HAND/WRIST NEC 6/30/2006     Type II or unspecified type diabetes mellitus without mention of complication, not stated as uncontrolled 2001    Diabetes mellitus/pt is diet controlled with weight loss     Typical atrial flutter (H) 01/17/2007    ablation 6/7/2017     Unspecified essential hypertension[MB1.5]         PSHx:[MB1.1]  Past Surgical History:   Procedure Laterality Date     ANGIOPLASTY       C APPENDECTOMY       C CABG, VEIN, FIVE  12/06    SVG x 4 and LIMA to LAD     C SOTO W/O FACETEC FORAMOT/[facility] 1/2 VRT SEG, LUMBAR  1968     C REMV CATARACT INTRACAP,INSERT LENS      Bilateral     C TOTAL ABDOM HYSTERECTOMY  1995     - fibroids     C VAGOTOMY/PYLOROPLASTY,SELECT  1970     COLONOSCOPY  12/3/2007     COLONOSCOPY  1/17/2014    Procedure: COLONOSCOPY;  Colonoscopy;  Surgeon: Jinny  MD Zack;  Location: [facility]     CYSTOSCOPY  11/25/09    [facility]     ENDOSCOPY  03/21/2000    Upper GI     HC COLONOSCOPY THRU STOMA, DIAGNOSTIC  10/7/04    poor prep, repeat in 2-3 years     HC COLONOSCOPY W/WO BRUSH/WASH  10/31/07    Repeat-poor quality prep     HC REMOVAL OF OVARY/TUBE(S)      Salpingo-Oophorectomy, Unilateral     HC REVISE MEDIAN N/CARPAL TUNNEL SURG      Carpal tunnel release     HC UGI ENDOSCOPY DIAG W BIOPSY  10/31/07     HC UGI ENDOSCOPY, SIMPLE EXAM  12/3/2007     OPEN REDUCTION INTERNAL FIXATION HIP NAILING Left 7/3/2016    Procedure: OPEN REDUCTION INTERNAL FIXATION HIP NAILING;  Surgeon: Lake Schulz MD;  Location: [facility] OR[MB1.5]        Medications:[MB1.1]  traMADol (ULTRAM) 50 MG tablet Take 1-2 tablets ( mg) by mouth every 6 hours as needed for pain maximum 3 tablet(s) per day   metoprolol (LOPRESSOR) 50 MG tablet Take 0.5 tablets (25 mg) by mouth 2 times daily   aspirin 81 MG tablet Take 1 tablet (81 mg) by mouth daily   amLODIPine (NORVASC) 5 MG tablet Take 2 tablets (10 mg) by mouth daily   sodium bicarbonate 650 MG tablet Take 1 tablet (650 mg) by mouth 3 times daily   calcium acetate (PHOSLO) 667 MG CAPS capsule Take 2 capsules (1,334 mg) by mouth 3 times daily (with meals)   furosemide (LASIX) 40 MG tablet Take 1 tablet (40 mg) by mouth daily   pravastatin (PRAVACHOL) 40 MG tablet Take 1 tablet (40 mg) by mouth daily   acetaminophen (TYLENOL) 325 MG tablet Take 2 tablets (650 mg) by mouth every 4 hours as needed for mild pain   nitroglycerin (NITROSTAT) 0.4 MG sublingual tablet Place 1 tablet (0.4 mg) under the tongue every 5 minutes as needed for chest pain (Patient not taking: Reported on 8/10/2017)   gabapentin (NEURONTIN) 300 MG capsule Take 1 capsule (300 mg) by mouth At Bedtime (Patient taking differently: Take 300 mg by mouth 2 times daily )   pramipexole (MIRAPEX) 0.5 MG tablet Take 3 tablets (1.5 mg) by mouth every evening   ferrous sulfate (IRON) 325 (65  FE) MG tablet Take 1 tablet (325 mg) by mouth daily (with breakfast)   calcium carbonate-vitamin D 500-400 MG-UNIT TABS per tablet Take 1 tablet by mouth daily   cyanocobalamin (VITAMIN B12) 1000 MCG/ML injection Inject 1 mL (1,000 mcg) into the muscle every 30 days   ORDER FOR DME Equipment being ordered: cpap machine, mask, humidifier, tubing and filters[MB1.5]       Allergies:[MB1.1]   Ciprofloxacin; Oxycodone; Lisinopril; Penicillins; and Sulfa drugs[MB1.5]     SocHx:[MB1.1]  Social History     Marital status:      Spouse name: Urbano Banks     Number of children: 2     Years of education: 13     Occupational History     Ministry coordinator      [employer]     Social History Main Topics     Smoking status: Former Smoker     Years: 10.00     Quit date: 1969     Smokeless tobacco: Never Used     Alcohol use 0.0 oz/week     0 Standard drinks or equivalent per week      Comment: occasional- 2 drinks per month     Drug use: No     Sexual activity: Yes     Birth control/ protection: Surgical     Other Topics Concern     Parent/Sibling W/ Cabg, Mi Or Angioplasty Before 65f 55m? No[MB1.5]       FamHx:[MB1.1]  Family History   Problem Relation Age of Onset     DIABETES Father      AODM     Neurologic Disorder Father      Parkinson's     Blood Disease Father       from blood clot from leg to lung immediately after hip fracture     DIABETES Paternal Grandmother      adult onset     DIABETES Maternal Grandmother      adult onset     Hypertension No family hx of      CEREBROVASCULAR DISEASE No family hx of[MB1.5]     Negative for bleeding disorders, clotting disorders, or problems with anesthesia    Review of Systems:  ROS: 10 point ROS neg other than the symptoms noted above in the HPI.    Physical Examination[MB1.1]   /64  Temp 97.6  F (36.4  C) (Axillary)  Resp 8  Wt 83.3 kg (183 lb 11.2 oz)  SpO2 99%  BMI 31.53 kg/m2[MB1.5]  General: Awake and alert.  NAD  Pulm:[MB1.1] Non labored breathing[MB1.2], no tachypnea   CV: RRR  Abdomen: soft, non-tender, no peritoneal signs  Musculoskel/Extremities:[MB1.1] BLE with multiple small clean based ulcers with surrounding erythema, no fluctuance or crepitus, no discharge or odor; distal strength, sensation and ROM intact[MB1.2]  Skin: no rashes, no diaphoresis and skin color normal     Labs: Reviewed[MB1.1]  Cr 5.13    WBC 7    UA RESULTS:  Recent Labs   Lab Test  08/12/17   1800   07/02/16   0224   COLOR  Yellow   < >  Yellow   APPEARANCE  Cloudy   < >  Clear   URINEGLC  Negative   < >  Negative   URINEBILI  Negative   < >  Negative   URINEKETONE  Negative   < >  Negative   SG  1.015   < >  1.020   UBLD  Large*   < >  Trace*   URINEPH  5.5   < >  6.0   PROTEIN  100*   < >  Negative   UROBILINOGEN   --    --   0.2   NITRITE  Negative   < >  Negative   LEUKEST  Large*   < >  Negative   RBCU  >182*   < >  2-5*   WBCU  2568*   < >  O - 2    < > = values in this interval not displayed.[MB1.2]     Imaging: Reviewed  CT Abd/Pelvis[MB1.1]  1. Findings of volume overload, including small bilateral pleural effusions, diffuse haziness of the mesentery, and diffuse anasarca. Enteritis is also a diagnostic consideration given the mesentery findings.  2. No bowel obstruction or features to suggest bowel ischemia on this noncontrast examination. No pneumatosis or portal venous gas.  3. Mild urinary bladder wall thickening with subtle surrounding inflammation, correlation with UA could be considered.  4. 5.4 x 6.1 x 9.7 cm simple attenuation fluid collection within the subcutaneous tissues of the lateral left buttock. Superimposed infection cannot be excluded.  5. Cholelithiasis.[MB1.2]    CT BLE[MB1.1]  Nonspecific diffuse edema throughout both ankles and feet were visualized. No gas within the soft tissues to specifically suggest necrotizing fasciitis[MB1.2]       Revision History        User Key Date/Time User Provider Type Action    >  [N/A] 8/13/2017 11:51 AM David Jansen MD Resident Sign     CM1.4 8/13/2017 11:51 AM David Jansen MD Resident Sign     CM1.3 8/13/2017 11:50 AM David Jansen MD Resident      CM1.5 8/13/2017  8:09 AM David Jansen MD Resident Sign     CM1.2 8/13/2017  8:08 AM David Jansen MD Resident Sign     CM1.1 8/13/2017  8:07 AM David Jansen MD Resident Sign     MB1.4 8/13/2017  2:18 AM Abimael Garland MD Resident Sign     MB1.3 8/13/2017  1:32 AM Abimael Garland MD Resident      MB1.2 8/13/2017 12:37 AM Abimael Garland MD Resident      MB1.5 8/12/2017  6:23 PM Abimael Garland MD Resident      MB1.1 8/12/2017  6:22 PM Abimael Garland MD Resident                   Progress Notes - Physician (Notes for yesterday and today)     No notes of this type exist for this encounter.      Procedure Notes     No notes of this type exist for this encounter.         Progress Notes - Therapies (Notes from 08/15/17 through 08/18/17)      Progress Notes by Megan Chapman PT at 8/15/2017  4:28 PM     Author:  Megan Chapman PT Service:  (none) Author Type:  Physical Therapist    Filed:  8/15/2017  4:29 PM Date of Service:  8/15/2017  4:28 PM Creation Time:  8/15/2017  4:28 PM    Status:  Signed :  Megan Chapman PT (Physical Therapist)          08/15/17 0905   Quick Adds   Type of Visit Initial PT Evaluation       Present no   Language english   Living Environment   Lives With child(zoe), adult;spouse  ( and grand daughter)   Living Arrangements house   Home Accessibility no concerns   Number of Stairs to Enter Home 0   Number of Stairs Within Home 0   Transportation Available car;family or friend will provide   Living Environment Comment Pt lives with  and grand daughter - reports both are able to assist as needed. Pt also reports currently has 2 friends staying with them as well and one is a  who cooks meals 2-3x per week.    Self-Care   Usual Activity Tolerance moderate    Current Activity Tolerance fair   Regular Exercise no   Equipment Currently Used at Home walker, rolling  (fww and 4ww)   Activity/Exercise/Self-Care Comment Pt reports no formal exercise and being able to ambulate household distances comfortably. Receives HH RN 3x per week - assists with LE wrapping as well. Grand daughter also assists with LE wrapping and completes on days RN is not present. Pt also reports getting out of chairs has been getting tougher, but still able to complete herself.,   Functional Level Prior   Ambulation 1-->assistive equipment   Transferring 1-->assistive equipment   Fall history within last six months no   Which of the above functional risks had a recent onset or change? ambulation;transferring   Prior Functional Level Comment PLOF is Mod I with use of fww or 4ww   General Information   Onset of Illness/Injury or Date of Surgery - Date 08/12/17   Referring Physician Lake Pierre MD   Patient/Family Goals Statement To return home   Pertinent History of Current Problem (include personal factors and/or comorbidities that impact the POC) 77 year old female with a history of CAD s/p CABD x5, HFpEF (60-65%) CKD 4, anemia, Type 2 DM, AFib s/p ablation 6/2017, presents with acute on chronic kidney disease and metabolic acidosis in the setting of possible cellulitis vs UTI as source, now improved on day 2 of antibiotics   Precautions/Limitations fall precautions   General Observations Pt supine in bed upon arrival. Pleasant and agreeable to therapy session.    General Info Comments Activity Orders: Up with assist.    Cognitive Status Examination   Orientation orientation to person, place and time   Level of Consciousness alert   Follows Commands and Answers Questions 100% of the time   Personal Safety and Judgment intact   Memory intact   Pain Assessment   Patient Currently in Pain No   Posture    Posture Forward head position;Protracted shoulders   Range of Motion (ROM)   ROM Comment B LE WFL  "  Strength   Strength Comments Demonstrates at least 3/5 B LE strength with functional mobility and transfers   Bed Mobility   Bed Mobility Comments Supine>sitting EOB with Zara of 1 and HOB elevated   Transfer Skills   Transfer Comments Sit<>stands with ModA of 1 and fww   Gait   Gait Comments NT - d/t multiple providers entering room   Balance   Balance Comments Seated: good. Standing: fair - Zara of 1 and fww for UE support while standing.   Sensory Examination   Sensory Perception Comments Pt reports hx of bottom of feet tingling - this is her baseline   General Therapy Interventions   Planned Therapy Interventions balance training;bed mobility training;gait training;ROM;strengthening;stretching;transfer training;home program guidelines;progressive activity/exercise   Clinical Impression   Criteria for Skilled Therapeutic Intervention yes, treatment indicated   PT Diagnosis Decreased functional mobility   Influenced by the following impairments Decreased strength and activity tolerance, impaired balance   Functional limitations due to impairments Impaired ability to functionally navigate in home or community   Clinical Presentation Stable/Uncomplicated   Clinical Presentation Rationale PMH   Clinical Decision Making (Complexity) Moderate complexity   Therapy Frequency` daily   Predicted Duration of Therapy Intervention (days/wks) 8/22/2017   Anticipated Equipment Needs at Discharge (pt has fww and 4ww at home)   Anticipated Discharge Disposition Transitional Care Facility;Home with Assist;Home with Home Therapy   Risk & Benefits of therapy have been explained Yes   Patient, Family & other staff in agreement with plan of care Yes   [facility] 2-Observe TM \"6 Clicks\"   2016, Trustees of [facility], under license to "[employer].  All rights reserved.   6 Clicks Short Forms Basic Mobility Inpatient Short Form   [facility] AM-PAC  \"6 Clicks\" V.2 Basic Mobility Inpatient Short Form   1. Turning from " "your back to your side while in a flat bed without using bedrails? 3 - A Little   2. Moving from lying on your back to sitting on the side of a flat bed without using bedrails? 2 - A Lot   3. Moving to and from a bed to a chair (including a wheelchair)? 2 - A Lot   4. Standing up from a chair using your arms (e.g., wheelchair, or bedside chair)? 2 - A Lot   5. To walk in hospital room? 2 - A Lot   6. Climbing 3-5 steps with a railing? 2 - A Lot   Basic Mobility Raw Score (Score out of 24.Lower scores equate to lower levels of function) 13   Total Evaluation Time   Total Evaluation Time (Minutes) 10[SG1.1]        Revision History        User Key Date/Time User Provider Type Action    > SG1.1 8/15/2017  4:29 PM Megan Chapman PT Physical Therapist Sign            Progress Notes by Vanita Gallego OT at 8/15/2017  3:09 PM     Author:  Vanita Gallego OT Service:  (none) Author Type:  Occupational Therapist    Filed:  8/15/2017  3:09 PM Date of Service:  8/15/2017  3:09 PM Creation Time:  8/15/2017  3:09 PM    Status:  Signed :  Vanita Gallego OT (Occupational Therapist)          08/15/17 1415   Quick Adds   Type of Visit Initial Occupational Therapy Evaluation   Living Environment   Lives With child(zoe), adult;spouse   Living Arrangements house   Home Accessibility grab bars present (bathtub)  (walk-in shower with built-in bench)   Number of Stairs to Enter Home 0   Number of Stairs Within Home 0  (stairs to loft that pt does not use)   Stair Railings at Home none   Transportation Available family or friend will provide  (Grand daughter)   Living Environment Comment Pt reports  has Parkinson's and may not be able to assist with physical tasks for pt if needed but often helps around the house.  Pt repots \"we help eachother when we need it\".  Pt reports granddaughter is available at times but comes and goes.    Self-Care   Dominant Hand right   Usual Activity Tolerance moderate   Current " "Activity Tolerance fair   Regular Exercise no   Equipment Currently Used at Home walker, rolling;grab bar;wound care supplies;walker, standard;shower chair;dressing device   Activity/Exercise/Self-Care Comment Per PT eval: Pt reports no formal exercise and being able to ambulate household distances comfortably. Receives HH RN 3x per week - assists with LE wrapping as well. Grand daughter also assists with LE wrapping and completes on days RN is not present.    Functional Level Prior   Ambulation 1-->assistive equipment   Transferring 1-->assistive equipment   Toileting 1-->assistive equipment   Bathing 1-->assistive equipment   Dressing 1-->assistive equipment   Eating 0-->independent   Communication 0-->understands/communicates without difficulty   Swallowing 0-->swallows foods/liquids without difficulty   Cognition 1 - attention or memory deficits   Fall history within last six months no   Which of the above functional risks had a recent onset or change? ambulation;transferring   Prior Functional Level Comment PLOF is Mod I with use of fww or 4ww, sock-aid/reacher, shower bench, RN completes med management   General Information   Onset of Illness/Injury or Date of Surgery - Date 08/12/17   Referring Physician Lake Pierre MD   Patient/Family Goals Statement Pt would like to return home to [facility]   Additional Occupational Profile Info/Pertinent History of Current Problem Per chart review: \"Kathryn Banks is a 77 year old female with a history of CAD s/p CABD x5, HFpEF (60-65%) CKD 4, anemia, Type 2 DM, AFib s/p ablation 6/2017, presents with acute on chronic kidney disease and metabolic acidosis in the setting of possible cellulitis vs UTI as source, now improved on day 2 of antibiotics. \"   Precautions/Limitations fall precautions   Cognitive Status Examination   Orientation person   Memory impaired   Executive Function Impulsive;Self awareness/monitoring impaired   Cognitive Comment Pt reports increased " memory impairements recently and she has been confused to date and location while admitted.    Visual Perception   Occulomotor Normal occulomotor function in all planes. No trophias or phorias but pt reports double vision that improves with monocular occlusion.    Visual Perception Comments Hx of cataract surgery with permanent lense. Wears reading glasses   Range of Motion (ROM)   ROM Comment BUE WFL, BLE limited by edema    Strength   Strength Comments BUE grossly 4/5, generalized weakness/deconditioning noted   Transfer Skill: Bed to Chair/Chair to Bed   Level of [place]: Bed to Chair minimum assist (75% patients effort)   Transfer Skill: Sit to Stand   Level of [place]: Sit/Stand stand-by assist   Transfer Skill: Toilet Transfer   Level of [place]: Toilet minimum assist (75% patients effort)   Balance   Balance Quick Add Sitting balance: static;Sitting balance: dynamic;Standing balance: static;Standing balance: dynamic   Sitting Balance: Static good balance   Sitting Balance: Dynamic good balance   Standing Balance: Static fair balance   Standing Balance: Dynamic fair balance   Lower Body Dressing   Level of [place]: Dress Lower Body dependent (less than 25% patients effort)  (Pt uses sock-aid at baseline)   Toileting   Level of [place]: Toilet moderate assist (50% patients effort)   Instrumental Activities of Daily Living (IADL)   Previous Responsibilities housekeeping;meal prep;finances   Activities of Daily Living Analysis   Impairments Contributing to Impaired Activities of Daily Living balance impaired;cognition impaired;coordination impaired;strength decreased  (edema )   General Therapy Interventions   Planned Therapy Interventions ADL retraining;strengthening;balance training;IADL retraining   Clinical Impression   Criteria for Skilled Therapeutic Interventions Met yes, treatment indicated   OT Diagnosis Decreased I/ADL independence   Influenced by the following impairments  "balance impaired;cognition impaired;coordination impaired;strength decreased   Assessment of Occupational Performance 3-5 Performance Deficits   Identified Performance Deficits balance impaired;cognition impaired;coordination impaired;strength decreased affecting occupational perfirmance with bathing, dressing, functional transfers and home safety/awareness   Clinical Decision Making (Complexity) Low complexity   Therapy Frequency 5 times/wk   Predicted Duration of Therapy Intervention (days/wks) 2 weeks   Anticipated Equipment Needs at Discharge (none)   Anticipated Discharge Disposition Long Term Care Facility   Risks and Benefits of Treatment have been explained. Yes   Patient, Family & other staff in agreement with plan of care Yes   Clinical Impression Comments Education provided, pt in agreement.    [facility] AM-PAC TM \"6 Clicks\"   2016, Trustees of [facility], under license to [employer].  All rights reserved.   6 Clicks Short Forms Daily Activity Inpatient Short Form   [facility]-PAC  \"6 Clicks\" Daily Activity Inpatient Short Form   1. Putting on and taking off regular lower body clothing? 2 - A Lot   2. Bathing (including washing, rinsing, drying)? 2 - A Lot   3. Toileting, which includes using toilet, bedpan or urinal? 3 - A Little   4. Putting on and taking off regular upper body clothing? 3 - A Little   5. Taking care of personal grooming such as brushing teeth? 4 - None   6. Eating meals? 4 - None   Daily Activity Raw Score (Score out of 24.Lower scores equate to lower levels of function) 18[KS1.1]        Revision History        User Key Date/Time User Provider Type Action    > KS1.1 8/15/2017  3:09 PM Vanita Gallego OT Occupational Therapist Sign                                                      INTERAGENCY TRANSFER FORM - LAB / IMAGING / EKG / EMG RESULTS   8/12/2017                       [address] [facility]: [phone]            Unresulted Labs     None       "   Lab Results - 3 Days      Blood gas venous [[id number]] (Abnormal)  Resulted: 08/18/17 0839, Result status: Final result    Ordering provider: Gilda Mejia MD  08/18/17 0747 Resulting lab: [facility]    Specimen Information    Type Source Collected On     08/18/17 0747          Components       Value Reference Range Flag Lab   Ph Venous 7.30 7.32 - 7.43 pH L 51   PCO2 Venous 41 40 - 50 mm Hg  51   PO2 Venous 59 25 - 47 mm Hg H 51   Bicarbonate Venous 20 21 - 28 mmol/L L 51   Base Deficit Venous 6.1 mmol/L  51   Comment:  Reference range:  -7.7 to 1.9   FIO2 21.0   51            Phosphorus [[id number]] (Abnormal)  Resulted: 08/18/17 0825, Result status: Final result    Ordering provider: Gilda Mejia MD  08/18/17 0747 Resulting lab: [facility]    Specimen Information    Type Source Collected On     08/18/17 0747          Components       Value Reference Range Flag Lab   Phosphorus 5.1 2.5 - 4.5 mg/dL H 51            Basic metabolic panel [[id number]] (Abnormal)  Resulted: 08/18/17 0825, Result status: Final result    Ordering provider: Gilda Mejia MD  08/18/17 0747 Resulting lab: [facility]    Specimen Information    Type Source Collected On     08/18/17 0747          Components       Value Reference Range Flag Lab   Sodium 147 133 - 144 mmol/L H 51   Potassium 4.1 3.4 - 5.3 mmol/L  51   Chloride 116 94 - 109 mmol/L H 51   Carbon Dioxide 19 20 - 32 mmol/L L 51   Anion Gap 12 3 - 14 mmol/L  51   Glucose 147 70 - 99 mg/dL H 51   Urea Nitrogen 40 7 - 30 mg/dL H 51   Creatinine 5.08 0.52 - 1.04 mg/dL H 51   GFR Estimate 8 >60 mL/min/1.7m2 L 51   Comment:  Non  GFR Calc   GFR Estimate If Black 10 >60 mL/min/1.7m2 L 51   Comment:  African American GFR Calc   Calcium 6.4 8.5 - 10.1 mg/dL L 51            CBC with platelets [[id number]] (Abnormal)  Resulted: 08/18/17 0804, Result status:  Final result    Ordering provider: Gilda Mejia MD  08/18/17 0747 Resulting lab: [facility]    Specimen Information    Type Source Collected On     08/18/17 0747          Components       Value Reference Range Flag Lab   WBC 6.4 4.0 - 11.0 10e9/L  51   RBC Count 3.22 3.8 - 5.2 10e12/L L 51   Hemoglobin 8.0 11.7 - 15.7 g/dL L 51   Hematocrit 26.3 35.0 - 47.0 % L 51   MCV 82 78 - 100 fl  51   MCH 24.8 26.5 - 33.0 pg L 51   MCHC 30.4 31.5 - 36.5 g/dL L 51   RDW 22.6 10.0 - 15.0 % H 51   Platelet Count 205 150 - 450 10e9/L  51            Blood culture [[id number]]  Resulted: 08/18/17 0656, Result status: Final result    Ordering provider: Ramila Mcclellan MD  08/12/17 2119 Resulting lab: [facility]    Specimen Information    Type Source Collected On   Blood  08/12/17 2210   Comment:  Unspecified Site          Components       Value Reference Range Flag Lab   Specimen Description Blood Unspecified Site      Culture Micro No growth   225            Blood culture [[id number]]  Resulted: 08/18/17 0656, Result status: Final result    Ordering provider: Ramila Mcclellan MD  08/12/17 2119 Resulting lab: [facility]    Specimen Information    Type Source Collected On   Blood  08/12/17 2210   Comment:  Unspecified Site          Components       Value Reference Range Flag Lab   Specimen Description Blood Unspecified Site      Culture Micro No growth   225            Glucose by meter [[id number]] (Abnormal)  Resulted: 08/18/17 0406, Result status: Final result    Ordering provider: Ricco Chung MD  08/17/17 2144 Resulting lab: POINT OF CARE TEST, GLUCOSE    Specimen Information    Type Source Collected On     08/17/17 2144          Components       Value Reference Range Flag Lab   Glucose 158 70 - 99 mg/dL H 170            Glucose by meter [[id number]] (Abnormal)  Resulted: 08/17/17 1716, Result status: Final  result    Ordering provider: Ricco Chung MD  08/17/17 1708 Resulting lab: POINT OF CARE TEST, GLUCOSE    Specimen Information    Type Source Collected On     08/17/17 1708          Components       Value Reference Range Flag Lab   Glucose 168 70 - 99 mg/dL H 170            Glucose by meter [[id number]] (Abnormal)  Resulted: 08/17/17 1345, Result status: Final result    Ordering provider: Ricco Chung MD  08/17/17 1259 Resulting lab: POINT OF CARE TEST, GLUCOSE    Specimen Information    Type Source Collected On     08/17/17 1259          Components       Value Reference Range Flag Lab   Glucose 206 70 - 99 mg/dL H 170            Basic metabolic panel [[id number]] (Abnormal)  Resulted: 08/17/17 0750, Result status: Final result    Ordering provider: Gilda Mejia MD  08/17/17 0001 Resulting lab: [facility]    Specimen Information    Type Source Collected On   Blood  08/17/17 0702          Components       Value Reference Range Flag Lab   Sodium 145 133 - 144 mmol/L H 51   Potassium 3.9 3.4 - 5.3 mmol/L  51   Chloride 116 94 - 109 mmol/L H 51   Carbon Dioxide 20 20 - 32 mmol/L  51   Anion Gap 10 3 - 14 mmol/L  51   Glucose 150 70 - 99 mg/dL H 51   Urea Nitrogen 41 7 - 30 mg/dL H 51   Creatinine 5.25 0.52 - 1.04 mg/dL H 51   GFR Estimate 8 >60 mL/min/1.7m2 L 51   Comment:  Non  GFR Calc   GFR Estimate If Black 10 >60 mL/min/1.7m2 L 51   Comment:  African American GFR Calc   Calcium 6.2 8.5 - 10.1 mg/dL L 51            CBC with platelets [[id number]] (Abnormal)  Resulted: 08/17/17 0731, Result status: Final result    Ordering provider: Gilda Mejia MD  08/17/17 0001 Resulting lab: [facility]    Specimen Information    Type Source Collected On   Blood  08/17/17 0702          Components       Value Reference Range Flag Lab   WBC 6.3 4.0 - 11.0 10e9/L  51   RBC Count 3.04 3.8 - 5.2 10e12/L L 51   Hemoglobin 7.4 11.7  - 15.7 g/dL L 51   Hematocrit 24.8 35.0 - 47.0 % L 51   MCV 82 78 - 100 fl  51   MCH 24.3 26.5 - 33.0 pg L 51   MCHC 29.8 31.5 - 36.5 g/dL L 51   RDW 22.7 10.0 - 15.0 % H 51   Platelet Count 217 150 - 450 10e9/L  51            Blood gas venous [[id number]] (Abnormal)  Resulted: 08/17/17 0711, Result status: Final result    Ordering provider: Lake Pierre MD  08/17/17 0001 Resulting lab: [facility]    Specimen Information    Type Source Collected On   Blood  08/17/17 0700          Components       Value Reference Range Flag Lab   Ph Venous 7.31 7.32 - 7.43 pH L 51   PCO2 Venous 41 40 - 50 mm Hg  51   PO2 Venous 55 25 - 47 mm Hg H 51   Bicarbonate Venous 20 21 - 28 mmol/L L 51   Base Deficit Venous 5.5 mmol/L  51   Comment:  Reference range:  -7.7 to 1.9   FIO2 21   51            Calcium ionized whole blood [[id number]] (Abnormal)  Resulted: 08/17/17 0711, Result status: Final result    Ordering provider: Lake Pierre MD  08/17/17 0601 Resulting lab: [facility]    Specimen Information    Type Source Collected On     08/17/17 0700          Components       Value Reference Range Flag Lab   Calcium Ionized Whole Blood 3.8 4.4 - 5.2 mg/dL L 51            Glucose by meter [[id number]] (Abnormal)  Resulted: 08/17/17 0206, Result status: Final result    Ordering provider: Ricco Chung MD  08/17/17 0146 Resulting lab: POINT OF CARE TEST, GLUCOSE    Specimen Information    Type Source Collected On     08/17/17 0146          Components       Value Reference Range Flag Lab   Glucose 152 70 - 99 mg/dL H 170            Glucose by meter [[id number]] (Abnormal)  Resulted: 08/16/17 2325, Result status: Final result    Ordering provider: Ricco Chung MD  08/16/17 2300 Resulting lab: POINT OF CARE TEST, GLUCOSE    Specimen Information    Type Source Collected On     08/16/17 2300          Components       Value Reference Range Flag Lab   Glucose  216 70 - 99 mg/dL H 170            Glucose by meter [[id number]] (Abnormal)  Resulted: 08/16/17 2245, Result status: Final result    Ordering provider: Ricco Chung MD  08/16/17 1725 Resulting lab: POINT OF CARE TEST, GLUCOSE    Specimen Information    Type Source Collected On     08/16/17 1725          Components       Value Reference Range Flag Lab   Glucose 244 70 - 99 mg/dL H 170            Wound Culture Aerobic Bacterial [[id number]] (Abnormal)  Resulted: 08/16/17 2125, Result status: Preliminary result    Ordering provider: Gilda Muro MD  08/12/17 1805 Resulting lab: [facility]    Specimen Information    Type Source Collected On   leg Leg Lower, Right 08/12/17 1800   Comment:  Wound          Components       Value Reference Range Flag Lab   Specimen Description Leg      Special Requests Specimen collected in eSwab transport (white cap)   75   Culture Micro --  A 75   Result:         Moderate growth  Pseudomonas aeruginosa     Culture Micro --  A 75   Result:         Moderate growth  Serratia marcescens     Culture Micro --  A 75   Result:         Moderate growth  Enterococcus faecalis     Culture Micro --  A 75   Result:         Heavy growth  Coagulase negative Staphylococcus  Susceptibility testing not routinely done     Culture Micro --  A 75   Result:         Light growth  Klebsiella oxytoca     Culture Micro --   75   Result:         Plus  Light growth  Normal skin tyler              Glucose by meter [[id number]] (Abnormal)  Resulted: 08/16/17 1211, Result status: Final result    Ordering provider: Ricco Chung MD  08/16/17 1208 Resulting lab: POINT OF CARE TEST, GLUCOSE    Specimen Information    Type Source Collected On     08/16/17 1208          Components       Value Reference Range Flag Lab   Glucose 142 70 - 99 mg/dL H 170            Glucose by meter [[id number]] (Abnormal)  Resulted: 08/16/17 0817, Result status: Final result    Ordering provider: Sheldon  Ricco Amato MD  08/16/17 0810 Resulting lab: POINT OF CARE TEST, GLUCOSE    Specimen Information    Type Source Collected On     08/16/17 0810          Components       Value Reference Range Flag Lab   Glucose 109 70 - 99 mg/dL H 170            Vancomycin level [[id number]] (Abnormal)  Resulted: 08/16/17 0800, Result status: Final result    Ordering provider: Zahira Eastman RPH  08/16/17 0022 Resulting lab: [facility]    Specimen Information    Type Source Collected On   Blood  08/16/17 0736          Components       Value Reference Range Flag Lab   Vancomycin Level 29.8 mg/L HH 51   Comment:         Traditional Dosing therapeutic Range:         Trough 8-20 mg/L         Peak 20-50 mg/L  Critical Value called to and read back by  ROSARIO LAU ON 08/16/17 AT 0759 BY TG              Albumin level [[id number]] (Abnormal)  Resulted: 08/16/17 0744, Result status: Final result    Ordering provider: Lake Pierre MD  08/16/17 0430 Resulting lab: [facility]    Specimen Information    Type Source Collected On     08/16/17 0430          Components       Value Reference Range Flag Lab   Albumin 1.6 3.4 - 5.0 g/dL L 51            Calcium ionized whole blood [[id number]] (Abnormal)  Resulted: 08/16/17 0655, Result status: Final result    Ordering provider: Nara Singletary MD  08/16/17 0001 Resulting lab: [facility]    Specimen Information    Type Source Collected On   Blood  08/16/17 0650          Components       Value Reference Range Flag Lab   Calcium Ionized Whole Blood 3.8 4.4 - 5.2 mg/dL L 51            Basic metabolic panel [[id number]] (Abnormal)  Resulted: 08/16/17 0549, Result status: Final result    Ordering provider: Lkae Pierre MD  08/15/17 2200 Resulting lab: [facility]    Specimen Information    Type Source Collected On   Blood  08/16/17 0430           Components       Value Reference Range Flag Lab   Sodium 147 133 - 144 mmol/L H 51   Potassium 3.8 3.4 - 5.3 mmol/L  51   Chloride 118 94 - 109 mmol/L H 51   Carbon Dioxide 18 20 - 32 mmol/L L 51   Anion Gap 11 3 - 14 mmol/L  51   Glucose 114 70 - 99 mg/dL H 51   Urea Nitrogen 42 7 - 30 mg/dL H 51   Creatinine 5.42 0.52 - 1.04 mg/dL H 51   GFR Estimate 8 >60 mL/min/1.7m2 L 51   Comment:  Non  GFR Calc   GFR Estimate If Black 9 >60 mL/min/1.7m2 L 51   Comment:  African American GFR Calc   Calcium 6.4 8.5 - 10.1 mg/dL L 51            Magnesium [[id number]]  Resulted: 08/16/17 0519, Result status: Final result    Ordering provider: Lake Pierre MD  08/15/17 2200 Resulting lab: [facility]    Specimen Information    Type Source Collected On   Blood  08/16/17 0430          Components       Value Reference Range Flag Lab   Magnesium 1.8 1.6 - 2.3 mg/dL  51            Phosphorus [[id number]] (Abnormal)  Resulted: 08/16/17 0519, Result status: Final result    Ordering provider: Lake Pierre MD  08/15/17 2200 Resulting lab: [facility]    Specimen Information    Type Source Collected On   Blood  08/16/17 0430          Components       Value Reference Range Flag Lab   Phosphorus 5.7 2.5 - 4.5 mg/dL H 51            Blood gas venous [[id number]] (Abnormal)  Resulted: 08/16/17 0501, Result status: Final result    Ordering provider: Lake Pierre MD  08/15/17 2200 Resulting lab: [facility]    Specimen Information    Type Source Collected On   Blood  08/16/17 0430          Components       Value Reference Range Flag Lab   Ph Venous 7.28 7.32 - 7.43 pH L 51   PCO2 Venous 42 40 - 50 mm Hg  51   PO2 Venous 69 25 - 47 mm Hg H 51   Bicarbonate Venous 20 21 - 28 mmol/L L 51   Base Deficit Venous 6.4 mmol/L  51   Comment:  Reference range:  -7.7 to 1.9   FIO2 4L   51            Calcium ionized whole blood  [[id number]] (Abnormal)  Resulted: 08/16/17 0501, Result status: Final result    Ordering provider: Lake Pierre MD  08/16/17 0430 Resulting lab: [facility]    Specimen Information    Type Source Collected On     08/16/17 0430          Components       Value Reference Range Flag Lab   Calcium Ionized Whole Blood 4.0 4.4 - 5.2 mg/dL L 51            CBC with platelets [[id number]] (Abnormal)  Resulted: 08/16/17 0449, Result status: Final result    Ordering provider: Lake Pierre MD  08/15/17 2200 Resulting lab: [facility]    Specimen Information    Type Source Collected On   Blood  08/16/17 0430          Components       Value Reference Range Flag Lab   WBC 7.4 4.0 - 11.0 10e9/L  51   RBC Count 3.01 3.8 - 5.2 10e12/L L 51   Hemoglobin 7.4 11.7 - 15.7 g/dL L 51   Hematocrit 24.4 35.0 - 47.0 % L 51   MCV 81 78 - 100 fl  51   MCH 24.6 26.5 - 33.0 pg L 51   MCHC 30.3 31.5 - 36.5 g/dL L 51   RDW 22.4 10.0 - 15.0 % H 51   Platelet Count 207 150 - 450 10e9/L  51            Calcium ionized whole blood [[id number]]  Resulted: 08/16/17 0440, Result status: In process    Ordering provider: Lake Pierre MD  08/15/17 2200 Resulting lab: [facility]    Specimen Information    Type Source Collected On   Blood  08/16/17 0430            Urine Culture Aerobic Bacterial [[id number]] (Abnormal)  Resulted: 08/15/17 2302, Result status: Final result    Ordering provider: Gilda Muro MD  08/13/17 1141 Resulting lab: [facility]    Specimen Information    Type Source Collected On   Midstream Urine Urine Midstream 08/13/17 1136          Components       Value Reference Range Flag Lab   Specimen Description Midstream Urine      Special Requests Specimen received in preservative   75   Culture Micro --  A 225   Result:         >100,000 colonies/mL  Escherichia coli  Susceptibility testing done on previous specimen     Culture Micro  --  A 225   Result:         50,000 to 100,000 colonies/mL  Enterococcus faecalis     Culture Micro --   225   Result:         Plus  10,000 to 50,000 colonies/mL  mixed urogenital tyler  Susceptibility testing not routinely done              ILA [[id number]] (Abnormal)  Resulted: 08/15/17 2302, Result status: Final result    Ordering provider: Ricco Chung MD  08/13/17 1136 Resulting lab: [facility]    Specimen Information    Type Source Collected On   Midstream Urine  08/13/17 1136          Components       Value Reference Range Flag Lab   Ampicillin <=2 ug/mL S 225   Ciprofloxacin 1 ug/mL S 225   Levofloxacin 1 ug/mL S 225   Nitrofurantoin 32 ug/mL S 225   Penicillin 2 ug/mL S 225   Streptomycin 2000 --  S 225   Comment:  No high level streptomycin resistance found   Tetracycline >=16 ug/mL R 225   Vancomycin <=0.5 ug/mL S 225   Gentamicin Screen --  S 225   Comment:         No high level gentamicin resistance found - If no high level gentamicin   resistance is found, combination therapy with an aminoglycoside may be   indicated for serious enterococcal infections such as bacteremia and   endocarditis.     Linezolid 2 ug/mL S 225   Quinupristin/Dalfopristin 1 ug/ml R 225   Tigecycline <=0.12 ug/mL S 225            Vancomycin level [[id number]] (Abnormal)  Resulted: 08/15/17 2205, Result status: Final result    Ordering provider: Lake Pierre MD  08/15/17 1400 Resulting lab: [facility]    Specimen Information    Type Source Collected On   Blood  08/15/17 1958          Components       Value Reference Range Flag Lab   Vancomycin Level 29.9 mg/L HH 51   Comment:         Traditional Dosing therapeutic Range:         Trough 8-20 mg/L         Peak 20-50 mg/L  Critical Value called to and read back by  ROSARIO MONTES DE OCA @1007 08.15.17 UU6B. BY EK              Glucose by meter [[id number]] (Abnormal)  Resulted: 08/15/17 2145, Result status: Final  result    Ordering provider: Ricco Chung MD  08/15/17 2141 Resulting lab: POINT OF CARE TEST, GLUCOSE    Specimen Information    Type Source Collected On     08/15/17 2141          Components       Value Reference Range Flag Lab   Glucose 244 70 - 99 mg/dL H 170            Blood gas venous [[id number]] (Abnormal)  Resulted: 08/15/17 1645, Result status: Final result    Ordering provider: Lake Pierre MD  08/15/17 1000 Resulting lab: [facility]    Specimen Information    Type Source Collected On   Blood  08/15/17 1632          Components       Value Reference Range Flag Lab   Ph Venous 7.21 7.32 - 7.43 pH L 51   PCO2 Venous 48 40 - 50 mm Hg  51   PO2 Venous 26 25 - 47 mm Hg  51   Bicarbonate Venous 19 21 - 28 mmol/L L 51   Base Deficit Venous 8.5 mmol/L  51   Comment:  Abnormal Result, Ref range: -7.7 to 1.9   FIO2 21.0   51            Calcium ionized whole blood [[id number]] (Abnormal)  Resulted: 08/15/17 1645, Result status: Final result    Ordering provider: Lake Pierre MD  08/15/17 1632 Resulting lab: [facility]    Specimen Information    Type Source Collected On     08/15/17 1632          Components       Value Reference Range Flag Lab   Calcium Ionized Whole Blood 4.0 4.4 - 5.2 mg/dL L 51            Glucose by meter [[id number]] (Abnormal)  Resulted: 08/15/17 1620, Result status: Final result    Ordering provider: Ricco Chung MD  08/15/17 1616 Resulting lab: POINT OF CARE TEST, GLUCOSE    Specimen Information    Type Source Collected On     08/15/17 1616          Components       Value Reference Range Flag Lab   Glucose 220 70 - 99 mg/dL H 170   Comment:  /RN Notified            Clostridium difficile toxin B PCR [[id number]]  Resulted: 08/15/17 1402, Result status: Final result    Ordering provider: Lake Pierre MD  08/15/17 1023 Resulting lab: [facility]    Specimen Information     Type Source Collected On   Feces  08/15/17 1020          Components       Value Reference Range Flag Lab   Specimen Description Feces   51   C Diff Toxin B PCR Negative NEG^Negative  75   Comment:         Negative: Clostridium difficile target DNA sequences NOT detected, presumed   negative for Clostridium difficile toxin B or the number of bacteria present   may be below the limit of detection for the test.  FDA approved assay performed using [employer] GeneXpert real-time PCR.  A negative result does not exclude actual disease due to Clostridium difficile   and may be due to improper collection, handling and storage of the specimen   or the number of organisms in the specimen is below the detection limit of the   assay.              ILA [[id number]] (Abnormal)  Resulted: 08/15/17 1109, Result status: Preliminary result    Ordering provider: Ricco Chung MD  08/12/17 1800 Resulting lab: [facility]    Specimen Information    Type Source Collected On   leg  08/12/17 1800   Comment:  Wound          Components       Value Reference Range Flag Lab   Amikacin <=2 ug/mL S 75   Cefepime <=1 ug/mL S 75   Ceftazidime 4 ug/mL S 75   Ciprofloxacin <=0.25 ug/mL S 75   Gentamicin <=1 ug/mL S 75   Levofloxacin 0.5 ug/mL S 75   Piperacillin/Tazo 8 ug/mL S 75   Tobramycin <=1 ug/mL S 75   Meropenem <=0.25 ug/mL S 75            ILA [[id number]] (Abnormal)  Resulted: 08/15/17 1109, Result status: Preliminary result    Ordering provider: Ricco Chung MD  08/12/17 1800 Resulting lab: [facility]    Specimen Information    Type Source Collected On   leg  08/12/17 1800   Comment:  Wound          Components       Value Reference Range Flag Lab   Amikacin <=4.0 ug/mL S 225   Ampicillin >16.0 ug/mL R 225   Ampicillin/Sulbactam >16.0 ug/mL R 225   Cefepime <=2.0 ug/mL S 225   Ceftazidime <=1.0 ug/mL S 225   Ceftriaxone <=4.0 ug/mL S 225   Ciprofloxacin <=0.5 ug/mL S 225    Gentamicin <=1.0 ug/mL S 225   Levofloxacin <=1.0 ug/mL S 225   Piperacillin/Tazo <=8.0 ug/mL S 225   Tobramycin 2.0 ug/mL S 225   Trimethoprim/Sulfa <=2.0/38.0 ug/mL S 225   Meropenem <=1.0 ug/mL S 225            Phosphorus [[id number]] (Abnormal)  Resulted: 08/15/17 0725, Result status: Final result    Ordering provider: Javier Montejo MD  08/15/17 0436 Resulting lab: [facility]    Specimen Information    Type Source Collected On     08/15/17 0436          Components       Value Reference Range Flag Lab   Phosphorus 6.1 2.5 - 4.5 mg/dL H 51            Calcium ionized whole blood [[id number]] (Abnormal)  Resulted: 08/15/17 0600, Result status: Final result    Ordering provider: Lake Pierre MD  08/14/17 2200 Resulting lab: [facility]    Specimen Information    Type Source Collected On   Blood  08/15/17 0436          Components       Value Reference Range Flag Lab   Calcium Ionized Whole Blood 3.9 4.4 - 5.2 mg/dL L 51            Blood gas venous [[id number]] (Abnormal)  Resulted: 08/15/17 0600, Result status: Final result    Ordering provider: Lake Pierre MD  08/14/17 2200 Resulting lab: [facility]    Specimen Information    Type Source Collected On   Blood  08/15/17 0436          Components       Value Reference Range Flag Lab   Ph Venous 7.25 7.32 - 7.43 pH L 51   PCO2 Venous 43 40 - 50 mm Hg  51   PO2 Venous 38 25 - 47 mm Hg  51   Bicarbonate Venous 19 21 - 28 mmol/L L 51   Base Deficit Venous 7.8 mmol/L  51   Comment:  Abnormal Result, Ref range: -7.7 to 1.9   FIO2 21.0   51            Basic metabolic panel [[id number]] (Abnormal)  Resulted: 08/15/17 0536, Result status: Final result    Ordering provider: Javier Montejo MD  08/14/17 2200 Resulting lab: [facility]    Specimen Information    Type Source Collected On   Blood  08/15/17 0435           Components       Value Reference Range Flag Lab   Sodium 148 133 - 144 mmol/L H 51   Potassium 4.4 3.4 - 5.3 mmol/L  51   Chloride 119 94 - 109 mmol/L H 51   Carbon Dioxide 18 20 - 32 mmol/L L 51   Anion Gap 11 3 - 14 mmol/L  51   Glucose 120 70 - 99 mg/dL H 51   Urea Nitrogen 45 7 - 30 mg/dL H 51   Creatinine 5.51 0.52 - 1.04 mg/dL H 51   GFR Estimate 8 >60 mL/min/1.7m2 L 51   Comment:  Non  GFR Calc   GFR Estimate If Black 9 >60 mL/min/1.7m2 L 51   Comment:  African American GFR Calc   Calcium 6.7 8.5 - 10.1 mg/dL L 51            CBC with platelets [[id number]] (Abnormal)  Resulted: 08/15/17 0508, Result status: Final result    Ordering provider: Javier Montejo MD  08/14/17 2200 Resulting lab: [facility]    Specimen Information    Type Source Collected On   Blood  08/15/17 0436          Components       Value Reference Range Flag Lab   WBC 7.6 4.0 - 11.0 10e9/L  51   RBC Count 3.08 3.8 - 5.2 10e12/L L 51   Hemoglobin 7.4 11.7 - 15.7 g/dL L 51   Hematocrit 25.2 35.0 - 47.0 % L 51   MCV 82 78 - 100 fl  51   MCH 24.0 26.5 - 33.0 pg L 51   MCHC 29.4 31.5 - 36.5 g/dL L 51   RDW 22.3 10.0 - 15.0 % H 51   Platelet Count 234 150 - 450 10e9/L  51            Glucose by meter [[id number]] (Abnormal)  Resulted: 08/15/17 0445, Result status: Final result    Ordering provider: Ricco Chung MD  08/15/17 0440 Resulting lab: POINT OF CARE TEST, GLUCOSE    Specimen Information    Type Source Collected On     08/15/17 0440          Components       Value Reference Range Flag Lab   Glucose 108 70 - 99 mg/dL H 170            Urine Culture Aerobic Bacterial [[id number]] (Abnormal)  Resulted: 08/15/17 0416, Result status: Final result    Ordering provider: Ricco Chung MD  08/12/17 1800 Resulting lab: [facility]    Specimen Information    Type Source Collected On     08/12/17 1800          Components       Value Reference Range Flag Lab    Specimen Description Midstream Urine   51   Special Requests Specimen received in preservative   75   Culture Micro --  A 75   Result:         >100,000 colonies/mL Escherichia coli  50,000 to 100,000 colonies/mL Citrobacter koseri  Plus <10,000 colonies/mL mixed urogenital tyler Susceptibility testing not   routinely done     Micro Report Status FINAL 08/15/2017   75   Result:     Organism: 50,000 to 100,000 colonies/mL Citrobacter koseri   75   Organism: >100,000 colonies/mL Escherichia coli   75            Aldolase [[id number]]  Resulted: 08/15/17 0228, Result status: Final result    Ordering provider: Ricco Chung MD  08/12/17 1645 Resulting lab: [facility]    Specimen Information    Type Source Collected On   Blood  08/12/17 1600          Components       Value Reference Range Flag Lab   Aldolase 4.7   51   Comment:         Reference range: 1.5 to 8.1  Unit: U/L  (Note)  REFERENCE INTERVAL: Aldolase  Access complete set of age- and/or gender-specific  reference intervals for this test in the [facility]  Test Directory ([email or web address]).  Performed by [facility],  [address] [place] [address] [phone]  [email or web address], Herbert Del Real MD, Lab. Director              Testing Performed By     Lab - Abbreviation Name Director Address Valid Date Range    45 - RVM685 [facility] Unknown Unknown 01/28/02 0000 - Present    51 - Unknown [facility] Unknown [address] [facility] [address] 12/31/14 1010 - Present    75 - Unknown [facility] Unknown [address] [facility] [address] 01/15/15 1019 - Present    170 - Unknown POINT OF CARE TEST, GLUCOSE Unknown Unknown 10/31/11 1114 - Present    225 - Unknown [facility] Unknown [address] [facility] [address] 12/19/14 0954 - Present               Imaging Results - 3 Days      US LACI Doppler No Exercise [[id number]]  Resulted:  08/16/17 1553, Result status: Final result    Ordering provider: Emy Armas APRN CNP  08/16/17 0706 Resulted by: Aurora Larry MD [employer], Brian P, MD    Performed: 08/16/17 0830 - 08/16/17 0920 Resulting lab: RADIOLOGY RESULTS    Narrative:       Exam: Bilateral lower extremity resting ankle brachial indices dated  8/16/2017 9:20 AM    Comparison study: 8/15/2017    Clinical history: Please do toe pressures bilaterally.    Ordering provider: REFERRED SELF    Technique: Bilateral lower extremity resting toe brachial indices  obtained.    Findings:    Right:      Arm: 183 mmHg  First digit: 154 mmHg  Toe Brachial index: 0.83    Normal digital waveforms.    Left:     Arm: 185 mmHg   PT at ankle: 129 mmHg   TBI: 0.70    Normal digital waveforms      Impression:       Impression:     Right leg: Resting toe brachial index is 0.83, this is within normal  limits.    Left leg: Resting toe brachial index is 0.70 this is within normal  limits.       I have personally reviewed the examination and initial interpretation  and I agree with the findings.    AURORA LARRY MD       LACI Doppler No Excersie Portable [[id number]]  Resulted: 08/15/17 1203, Result status: Final result    Ordering provider: Ramila Mcclellan MD  08/12/17 2119 Resulted by: Jeff Lewis MD Craig, Paul Grant, MD    Performed: 08/15/17 1013 - 08/15/17 1051 Resulting lab: RADIOLOGY RESULTS    Narrative:       Exam: Bilateral lower extremity resting ankle brachial indices dated  8/15/2017 10:51 AM    Comparison study: CT 8/12/2017    Clinical history: Chronic venous stasis with infected wounds, history  of coronary artery disease with diabetes. Concern for peripheral  arterial disease.    Ordering provider: Ricco Chung    Technique: Bilateral lower extremity resting ankle brachial indices  obtained.    Findings:    Right:      Arm: 190 mmHg   PT at ankle: Noncompressible   DP at foot: 227 mmHg   LACI: 1.19   TBI: 0.97    Toe  PPG: Normal    Left:     Arm: 180 mmHg   PT at ankle: 214 mmHg   DP at foot: 217 mmHg   LACI: 1.14   TBI: 0.64    Toe PPG: Normal      Impression:       Impression:     Right leg: Resting LACI is 1.19 based on the dorsalis pedis  measurement, which is normal. It should be noted however that the  posterior tibial artery is noncompressible suggesting arterial  calcification. In this setting, LACI measurement in the other  interrogated vessels may be falsely elevated. Normal TBI and digit PPG  waveforms suggest the absence of hemodynamically relevant peripheral  arterial disease.    Left leg: Resting LACI is 1.14,. As discussed above, the presence of a  noncompressible right posterior tibial artery suggests arterial  calcification, which is usually present in other arterial  distributions and can falsely elevate LACI determination in those  vessels. The first digit TBI is abnormal at 0.64. The discrepancy  between the normal LACI and the abnormal TBI can be seen in the setting  of small vessel pedal disease and/or microvascular disease. More  proximal disease cannot be excluded in this case, as LACI determination  may not be reliable in this patient with calcified vessels. Further  evaluation with arterial duplex may be helpful.    Guidelines:    LACI Diagnostic Criteria (Based on criteria published in Circulation  2011; 124: 3987-6719):    > 1.4: Non compressible    1.00 - 1.40: Normal    0.91 - 0.99: Borderline    At or below 0.90: Abnormal    LACI Diagnostic Criteria (Based on ACC/AHA guideline 2008):    >/=1.3 - non compressible vessels    1.00  -1.29 - Normal    0.91 - 0.99 - Borderline    0.41 - 0.90 - Mild to moderate PAD    0.00 - 0.40 - Severe PAD    I have personally reviewed the examination and initial interpretation  and I agree with the findings.    QIAN LACEY MD      Testing Performed By     Lab - Abbreviation Name Director Address Valid Date Range    104 - Rad Rslts RADIOLOGY RESULTS Unknown Unknown 02/16/05  1553 - Present               ECG/EMG Results      ECHO COMPLETE WITH OPTISON [[id number]]  Resulted: 17 1134, Result status: Edited Result - FINAL    Ordering provider: Geovanna Mcclellan MD  17 0513 Resulted by: Marni Colin MD    Performed: 17 1147 - 17 1204 Resulting lab: RADIOLOGY RESULTS    Narrative:       [id number]  [id number]  [id number]  [id number]^CHERYLE^GEOVANNA^CHERELLE           [facility],[place]  Echocardiography Laboratory  [address] [place] [id number]     Name: MARI HERNANDEZ  MRN: [id number]  : 1940  Study Date: 2017 11:34 AM  Age: 77 yrs  Gender: Female  Patient Location: [place]  Reason For Study: CHF  Ordering Physician: GEOVANNA MCCLELLAN  Referring Physician: SELF, REFERRED  Performed By: Vanita Carr RDCS     BSA: 1.9 m2  Height: 64 in  Weight: 183 lb  BP: 100/74 mmHg  _____________________________________________________________________________  __        Procedure  Echocardiogram with two-dimensional, color and spectral Doppler performed.  Contrast Optison. Optison (NDC #7610-1911-23) given intravenously. Patient was  given 3 ml mixture of 3 ml Optison and 6 ml saline. 6 ml wasted.  _____________________________________________________________________________  __        Interpretation Summary  Global and regional left ventricular function is normal with an EF of 60-65%.  Global right ventricular function is mildly to moderately reduced.  Estimated pulmonary artery systolic pressure is 44 mmHg plus right atrial  pressure. Estiamted mean RA pressure is 15 mmHg.  No pericardial effusion is present.  _____________________________________________________________________________  __        Left Ventricle  Left ventricular size is normal. Left ventricular wall thickness is normal.  Global and regional left ventricular function is normal with an EF of 60-65%.  There are features of diastolic dysfunction in the setting of  moderate mitral  annular calcification. No regional wall motion abnormalities are seen.     Right Ventricle  Mild to moderate right ventricular dilation is present. Global right  ventricular function is mildly to moderately reduced.     Atria  Severe biatrial enlargement is present.     Mitral Valve  Moderate mitral annular calcification is present. Mild mitral insufficiency is  present.        Aortic Valve  The aortic valve is tricuspid. Mild to moderate aortic valve sclerosis is  present.     Tricuspid Valve  Mild tricuspid insufficiency is present. Estimated pulmonary artery systolic  pressure is 44 mmHg plus right atrial pressure.     Pulmonic Valve  Trace to mild pulmonic insufficiency is present.     Vessels  The aorta root is normal. Dilation of the inferior vena cava is present with  abnormal respiratory variation in diameter. Estimated mean right atrial  pressure is 15 mmHg.     Pericardium  No pericardial effusion is present.        Compared to Previous Study  This study was compared with the study from 17, no significant changes .  _____________________________________________________________________________  __  MMode/2D Measurements & Calculations     IVSd: 0.87 cm  LVIDd: 4.5 cm  LVIDs: 2.7 cm  LVPWd: 1.1 cm  FS: 40.2 %  EDV(Teich): 92.4 ml  ESV(Teich): 26.8 ml  LV mass(C)d: 148.4 grams  LV mass(C)dI: 78.8 grams/m2  asc Aorta Diam: 3.4 cm  LVOT diam: 2.2 cm  LVOT area: 3.8 cm2  LA Volume (BP): 102.0 ml  LA Volume Index (BP): 54.3 ml/m2           Doppler Measurements & Calculations  MV E max micah: 111.0 cm/sec  MV A max micah: 53.1 cm/sec  MV E/A: 2.1  MV dec time: 0.20 sec  PA acc time: 0.11 sec  TR max micah: 317.0 cm/sec  TR max P.6 mmHg  Lateral E/e': 21.9  Medial E/e': 29.4           _____________________________________________________________________________  __           Report approved by: Adams Fallon 2017 12:58 PM       1    Type Source Collected On     17 1134           View Image (below)        Echo limited (Adults Only) [[id number]]  Resulted: 08/13/17 1148, Result status: In process    Ordering provider: Ramila Mcclellan MD  08/13/17 0513 Performed: 08/13/17 1147 - 08/13/17 1147    Resulting lab: [facility]                   Encounter-Level Documents:     There are no encounter-level documents.      Order-Level Documents:     There are no order-level documents.

## 2017-08-12 NOTE — H&P
MICU Admit Note 8-12-17    MICU Staff      This patient has been seen and evaluated by me.  I have discussed care with the housestaff and agree with the findings and plan in their note. 77 year old female with DM, CKD, CAD, s/p CABG and H/O A-fib s/p ablation transferred from [facility] for worsening renal failure and metabolic acidosis. Presented to ED today for increasing bilateral leg pain, but worse in right leg. Has had leg pain for ~ 1 month. Has had bilat ulcers on the dorsum of her legs that weeks that have been getting worse. Found to be in renal failure with pH of 7.06 and HCO3 of 10 and K of 5.6. Culture leg wounds. Check CK and aldolase and lactate. Will need CT of legs - am concerned about cellulitis and possible deep seeded infection. Will need to have surgery see. Renal aware of patient - will need to get HD.    Ricco Chung  Pulmonary/Critical Care  August 12, 2017 4:54 PM    CCT 45 minutes separate from procedures

## 2017-08-12 NOTE — PHARMACY-VANCOMYCIN DOSING SERVICE
Pharmacy Vancomycin Initial Note  Date of Service 2017  Patient's  1940  77 year old, female    Indication: Skin and Soft Tissue Infection    Current estimated CrCl = Estimated Creatinine Clearance: 10 mL/min (based on Cr of 5.07).    Creatinine for last 3 days  2017: 10:13 AM Creatinine 5.18 mg/dL;  4:16 PM Creatinine 5.07 mg/dL    Recent Vancomycin Level(s) for last 3 days  No results found for requested labs within last 72 hours.      Vancomycin IV Administrations (past 72 hours)      No vancomycin orders with administrations in past 72 hours.                Nephrotoxins and other renal medications (Future)    Start     Dose/Rate Route Frequency Ordered Stop    17 1730  vancomycin (VANCOCIN) 2,000 mg in NaCl 0.9 % 500 mL intermittent infusion      2,000 mg Intravenous ONCE 17 1724      17 1723  vancomycin place maki - receiving intermittent dosing      1 each Does not apply SEE ADMIN INSTRUCTIONS 17 1724            Contrast Orders - past 72 hours     None                Plan:  1.  Start vancomycin  2000 mg IV once. Further dosing will be pending vancomycin levels due to acute decline in renal function.  2.  Goal Trough Level: 15-20 mg/L   3.  Pharmacy will check trough levels as appropriate in 1-3 Days.    4. Serum creatinine levels will be ordered daily for the first week of therapy and at least twice weekly for subsequent weeks.    5. [place] method utilized to dose vancomycin therapy: Method 2    Anisa Wei, Pharm.D. Resident

## 2017-08-12 NOTE — ED NOTES
Brought to ED by granddaughter with pain to her legs, has had open sores to her legs for a long time, but pain has been increasing for about a week. Has home health care nurse who has been consulting with Dr Dheeraj Arango about them. Today Pain unbearable. Dr Jj ordered her Tramadol yesterday and she took two this AM at 0830.

## 2017-08-12 NOTE — IP AVS SNAPSHOT
MRN:[id number]                      After Visit Summary   8/12/2017    Kathryn Banks    MRN: [id number]           Thank you!     Thank you for choosing [facility] for your care. Our goal is always to provide you with excellent care. Hearing back from our patients is one way we can continue to improve our services. Please take a few minutes to complete the written survey that you may receive in the mail after you visit with us. Thank you!        Patient Information     Date Of Birth          1940        Designated Caregiver       Most Recent Value    Caregiver    Will someone help with your care after discharge? yes    Name of designated caregiver Christian    Phone number of caregiver see chart    Caregiver address see chart      About your hospital stay     You were admitted on:  August 12, 2017 You last received care in the:  [address] [facility]    You were discharged on:  August 18, 2017        Reason for your hospital stay       Acute kidney injury, urinary tract infection, cellulitis, venous stasis ulceration                  Who to Call     For medical emergencies, please call 911.  For non-urgent questions about your medical care, please call your primary care provider or clinic, [phone]          Attending Provider     Provider Specialty    Ricco Chung MD Pulmonary    Cahvez, Cecil Antoine MD Internal Medicine    Rajan Epperson MD Pediatrics    [facility], Yareli Dixon MD Internal Medicine       Primary Care Provider Office Phone # Fax #    Dheeraj Jj [phone]      After Care Instructions     Activity - Up with assistive device           Additional Discharge Instructions       - Monitoring of weight and intake/output. If weight increasing by 3 pounds in day or 5 pounds in a week, then she likely needs to restart lasix (she was on 40mg daily previously), however would discuss this with nephrology            Advance Diet as Tolerated       Follow this diet  upon discharge: Orders Placed This Encounter      Renal Diet (non-dialysis)            Daily weights       Call Provider for weight gain of more than 2 pounds per day or 5 pounds per week.            General info for SNF       Length of Stay Estimate: Short Term Care: Estimated # of Days <30  Condition at Discharge: Improving  Level of care:skilled   Rehabilitation Potential: Good  Admission H&P remains valid and up-to-date: Yes  Recent Chemotherapy: N/A  Use Nursing Home Standing Orders: Yes            Intake and output       Every shift            Mantoux instructions       Give two-step Mantoux (PPD) Per Facility Policy Yes            Wound care       Site:   Lower extremity ulcers  Instructions:  cleanse the wound with microklenz moisten gauze pat dry cut a piece of PolyMem roll dressing to fit the size of the wound and place over the wound and cover with / ABD pad and secure with Kerlix and wrap with ACe wraps change dressing daily and as needed if soiled                  Follow-up Appointments     Follow Up and recommended labs and tests       - BMP in 3-5 days  - Has nephrology follow-up on 8/22/17, she need to go to this appointment  - Dermatology follow-up in 4-6 weeks  - Vascular surgery follow-up, timing to be arranged by vascular surgery  - Please schedule an appointment with primary care provider within a week of discharge from TCU  - Opthalmology referral placed, needs appointment within a month                  Your next 10 appointments already scheduled     Aug 22, 2017  2:00 PM CDT   LAB with LAB FIRST FLOOR [facility] ([facility])    [id number] [place] [id number] [phone]           Patient must bring picture ID. Patient should be prepared to give a urine specimen  Please do not eat 10-12 hours before your appointment if you are coming in fasting for labs on lipids, cholesterol, or glucose (sugar). Pregnant women should follow their  Care Team instructions. Water with medications is okay. Do not drink coffee or other fluids. If you have concerns about taking  your medications, please ask at office or if scheduling via [email or web address], send a message by clicking on Secure Messaging, Message Your Care Team.            Aug 22, 2017  2:30 PM CDT   Return Visit with Vibha Barfield MD   [facility]    [address] [place] [id number] [phone]              Additional Services     Medication Therapy Management Referral       Reason for referral:  on more than 5 medications and managing chronic disease    This service is designed to help you get the most from your medications.  A specially trained pharmacist will work closely with you and your doctors  to solve any problems related to your medications and to help you get the   best results from taking them.      The Medication Therapy Management staff will call you to schedule an appointment.            Occupational Therapy Adult Consult       Evaluate and treat as clinically indicated.    Reason:  Deconditioning            Physical Therapy Adult Consult       Evaluate and treat as clinically indicated.    Reason:  Deconditioning                  Future tests that were ordered for you     US TCO2 Bilateral       Have you called the [facility] to schedule this exam?     Please call [phone].                  Pending Results     Date and Time Order Name Status Description    8/17/2017 0859 EKG 12-lead, tracing only Preliminary     8/12/2017 1805 Wound Culture Aerobic Bacterial Preliminary     8/12/2017 1800 ILA Preliminary     8/12/2017 1800 ILA Preliminary             Statement of Approval     Ordered          08/18/17 1202  I have reviewed and agree with all the recommendations and orders detailed in this document.  EFFECTIVE NOW     Approved and electronically signed by:  L[place] Pierre MD             Admission  "Information     Date & Time Provider Department Dept. Phone    2017 Yareli Lorenzo MD [address] [facility] [phone]      Your Vitals Were     Blood Pressure Pulse Temperature Respirations Weight Pulse Oximetry    151/80 (BP Location: Right arm) 73 95.7  F (35.4  C) (Oral) 18 82 kg (180 lb 12.8 oz) 92%    BMI (Body Mass Index)                   31.03 kg/m2           MyChart Information     [employer] lets you send messages to your doctor, view your test results, renew your prescriptions, schedule appointments and more. To sign up, go to [email or web address] . Click on \"Log in\" on the left side of the screen, which will take you to the Welcome page. Then click on \"Sign up Now\" on the right side of the page.     You will be asked to enter the access code listed below, as well as some personal information. Please follow the directions to create your username and password.     Your access code is: [email or web address]  Expires: 2017 11:18 AM     Your access code will  in 90 days. If you need help or a new code, please call your [facility] clinic or [phone].        Care EveryWhere ID     This is your Care EveryWhere ID. This could be used by other organizations to access your [facility] medical records  [id number]        Equal Access to Services     DEVONTE BLAND AH: Hadii pascual dee hadasho Sodory, waaxda luqadaha, qaybta kaalmada shayan, tay murillo. So [facility] [phone].    ATENCIÓN: Si habla español, tiene a medrano disposición servicios gratuitos de asistencia lingüística. Vicki al [phone].    We comply with applicable federal civil rights laws and [place] laws. We do not discriminate on the basis of race, color, national origin, age, disability sex, sexual orientation or gender identity.               Review of your medicines      START taking        Dose / Directions    acidophilus tablet   Used for:  Urinary tract infection without hematuria, site unspecified, " Cellulitis of lower extremity, unspecified laterality        Dose:  1 tablet   Take 1 tablet by mouth daily   Refills:  0       amoxicillin 500 MG capsule   Commonly known as:  AMOXIL   Indication:  Skin and Soft Tissue Infection, Urinary Tract Infection   Used for:  Urinary tract infection without hematuria, site unspecified        Dose:  500 mg   Start taking on:  8/19/2017   Take 1 capsule (500 mg) by mouth every 24 hours for 2 doses   Quantity:  2 capsule   Refills:  0       cholecalciferol 2000 UNITS tablet   Used for:  Vitamin D deficiency        Dose:  2000 Units   Take 2,000 Units by mouth daily   Quantity:  30 tablet   Refills:  0       levofloxacin 250 MG tablet   Commonly known as:  LEVAQUIN   Indication:  Skin and Soft Tissue Infection, Urinary Tract Infection   Used for:  Urinary tract infection without hematuria, site unspecified, Cellulitis of lower extremity, unspecified laterality        Dose:  250 mg   Start taking on:  8/19/2017   Take 1 tablet (250 mg) by mouth every 48 hours for 1 dose   Quantity:  1 tablet   Refills:  0       pentoxifylline 400 MG CR tablet   Commonly known as:  TRENtal   Used for:  Venous stasis ulcer (H)        Dose:  400 mg   Take 1 tablet (400 mg) by mouth daily   Refills:  0         CONTINUE these medicines which may have CHANGED, or have new prescriptions. If we are uncertain of the size of tablets/capsules you have at home, strength may be listed as something that might have changed.        Dose / Directions    calcium acetate 667 MG Caps capsule   Commonly known as:  PHOSLO   This may have changed:  how much to take   Used for:  Chronic kidney disease, unspecified CKD stage        Dose:  667 mg   Take 1 capsule (667 mg) by mouth 3 times daily (with meals)   Quantity:  180 capsule   Refills:  0       gabapentin 300 MG capsule   Commonly known as:  NEURONTIN   This may have changed:  when to take this   Used for:  Diabetic polyneuropathy associated with type 2 diabetes  mellitus (H)        Dose:  300 mg   Take 1 capsule (300 mg) by mouth At Bedtime   Quantity:  90 capsule   Refills:  0       sodium bicarbonate 650 MG tablet   This may have changed:  how much to take   Used for:  Chronic kidney disease, unspecified CKD stage        Dose:  1300 mg   Take 2 tablets (1,300 mg) by mouth 3 times daily   Refills:  0         CONTINUE these medicines which have NOT CHANGED        Dose / Directions    acetaminophen 325 MG tablet   Commonly known as:  TYLENOL   Used for:  Leg pain, bilateral        Dose:  650 mg   Take 2 tablets (650 mg) by mouth every 4 hours as needed for mild pain   Quantity:  100 tablet   Refills:  0       amLODIPine 5 MG tablet   Commonly known as:  NORVASC   Used for:  Benign essential hypertension        Dose:  10 mg   Take 2 tablets (10 mg) by mouth daily   Quantity:  30 tablet   Refills:  3       aspirin 81 MG tablet   Used for:  Coronary artery disease involving native coronary artery of native heart without angina pectoris        Dose:  81 mg   Take 1 tablet (81 mg) by mouth daily   Quantity:  30 tablet   Refills:  3       calcium carbonate-vitamin D 500-400 MG-UNIT Tabs per tablet   Used for:  Chronic kidney disease, unspecified CKD stage        Dose:  1 tablet   Take 1 tablet by mouth daily   Quantity:  60 tablet   Refills:  0       cyanocobalamin 1000 MCG/ML injection   Commonly known as:  VITAMIN B12   Used for:  Vitamin B12 deficiency (non anemic)        Dose:  1 mL   Inject 1 mL (1,000 mcg) into the muscle every 30 days   Quantity:  1 mL   Refills:  11       ferrous sulfate 325 (65 FE) MG tablet   Commonly known as:  IRON   Used for:  Iron deficiency anemia secondary to inadequate dietary iron intake        Dose:  325 mg   Take 1 tablet (325 mg) by mouth daily (with breakfast)   Quantity:  100 tablet   Refills:  0       metoprolol 50 MG tablet   Commonly known as:  LOPRESSOR   Used for:  Atrial fibrillation with rapid ventricular response (H)        Dose:  25  mg   Take 0.5 tablets (25 mg) by mouth 2 times daily   Quantity:  180 tablet   Refills:  3       nitroGLYcerin 0.4 MG sublingual tablet   Commonly known as:  NITROSTAT   Used for:  Hx of non-ST elevation myocardial infarction (NSTEMI), Atherosclerosis of native coronary artery of native heart without angina pectoris, Postsurgical aortocoronary bypass status        Dose:  0.4 mg   Place 1 tablet (0.4 mg) under the tongue every 5 minutes as needed for chest pain   Quantity:  25 tablet   Refills:  0       order for DME   Used for:  ANABEL (obstructive sleep apnea)        Equipment being ordered: cpap machine, mask, humidifier, tubing and filters   Quantity:  1 Device   Refills:  0       pravastatin 40 MG tablet   Commonly known as:  PRAVACHOL   Used for:  Hx of non-ST elevation myocardial infarction (NSTEMI), Atherosclerosis of native coronary artery of native heart without angina pectoris, Type 2 diabetes mellitus with other circulatory complications (H)        Dose:  40 mg   Take 1 tablet (40 mg) by mouth daily   Quantity:  90 tablet   Refills:  3       traMADol 50 MG tablet   Commonly known as:  ULTRAM   Used for:  Leg pain, bilateral        Dose:   mg   Take 1-2 tablets ( mg) by mouth every 6 hours as needed for pain maximum 3 tablet(s) per day   Quantity:  20 tablet   Refills:  0         STOP taking     furosemide 40 MG tablet   Commonly known as:  LASIX           pramipexole 0.5 MG tablet   Commonly known as:  MIRAPEX                Where to get your medicines      Some of these will need a paper prescription and others can be bought over the counter. Ask your nurse if you have questions.     Bring a paper prescription for each of these medications     traMADol 50 MG tablet       You don't need a prescription for these medications     acetaminophen 325 MG tablet    acidophilus tablet    amLODIPine 5 MG tablet    amoxicillin 500 MG capsule    aspirin 81 MG tablet    calcium acetate 667 MG Caps capsule     calcium carbonate-vitamin D 500-400 MG-UNIT Tabs per tablet    cholecalciferol 2000 UNITS tablet    cyanocobalamin 1000 MCG/ML injection    ferrous sulfate 325 (65 FE) MG tablet    gabapentin 300 MG capsule    levofloxacin 250 MG tablet    metoprolol 50 MG tablet    nitroGLYcerin 0.4 MG sublingual tablet    pentoxifylline 400 MG CR tablet    pravastatin 40 MG tablet    sodium bicarbonate 650 MG tablet                Protect others around you: Learn how to safely use, store and throw away your medicines at www.disposemymeds.org.             Medication List: This is a list of all your medications and when to take them. Check marks below indicate your daily home schedule. Keep this list as a reference.      Medications           Morning Afternoon Evening Bedtime As Needed    acetaminophen 325 MG tablet   Commonly known as:  TYLENOL   Take 2 tablets (650 mg) by mouth every 4 hours as needed for mild pain   Last time this was given:  650 mg on 8/17/2017  6:46 PM                                acidophilus tablet   Take 1 tablet by mouth daily                                amLODIPine 5 MG tablet   Commonly known as:  NORVASC   Take 2 tablets (10 mg) by mouth daily   Last time this was given:  10 mg on 8/18/2017  9:28 AM                                amoxicillin 500 MG capsule   Commonly known as:  AMOXIL   Take 1 capsule (500 mg) by mouth every 24 hours for 2 doses   Start taking on:  8/19/2017   Last time this was given:  500 mg on 8/17/2017  6:46 PM                                aspirin 81 MG tablet   Take 1 tablet (81 mg) by mouth daily                                calcium acetate 667 MG Caps capsule   Commonly known as:  PHOSLO   Take 1 capsule (667 mg) by mouth 3 times daily (with meals)   Last time this was given:  667 mg on 8/18/2017 12:22 PM                                calcium carbonate-vitamin D 500-400 MG-UNIT Tabs per tablet   Take 1 tablet by mouth daily                                cholecalciferol  2000 UNITS tablet   Take 2,000 Units by mouth daily   Last time this was given:  2,000 Units on 8/18/2017  9:28 AM                                cyanocobalamin 1000 MCG/ML injection   Commonly known as:  VITAMIN B12   Inject 1 mL (1,000 mcg) into the muscle every 30 days                                ferrous sulfate 325 (65 FE) MG tablet   Commonly known as:  IRON   Take 1 tablet (325 mg) by mouth daily (with breakfast)   Last time this was given:  325 mg on 8/18/2017  9:33 AM                                gabapentin 300 MG capsule   Commonly known as:  NEURONTIN   Take 1 capsule (300 mg) by mouth At Bedtime   Last time this was given:  300 mg on 8/17/2017  9:42 PM                                levofloxacin 250 MG tablet   Commonly known as:  LEVAQUIN   Take 1 tablet (250 mg) by mouth every 48 hours for 1 dose   Start taking on:  8/19/2017   Last time this was given:  500 mg on 8/17/2017  3:26 PM                                metoprolol 50 MG tablet   Commonly known as:  LOPRESSOR   Take 0.5 tablets (25 mg) by mouth 2 times daily   Last time this was given:  25 mg on 8/18/2017  9:40 AM                                nitroGLYcerin 0.4 MG sublingual tablet   Commonly known as:  NITROSTAT   Place 1 tablet (0.4 mg) under the tongue every 5 minutes as needed for chest pain                                order for DME   Equipment being ordered: cpap machine, mask, humidifier, tubing and filters                                pentoxifylline 400 MG CR tablet   Commonly known as:  TRENtal   Take 1 tablet (400 mg) by mouth daily   Last time this was given:  400 mg on 8/18/2017  9:33 AM                                pravastatin 40 MG tablet   Commonly known as:  PRAVACHOL   Take 1 tablet (40 mg) by mouth daily                                sodium bicarbonate 650 MG tablet   Take 2 tablets (1,300 mg) by mouth 3 times daily   Last time this was given:  1,300 mg on 8/18/2017  9:35 AM                                traMADol  50 MG tablet   Commonly known as:  ULTRAM   Take 1-2 tablets ( mg) by mouth every 6 hours as needed for pain maximum 3 tablet(s) per day   Last time this was given:  25 mg on 8/18/2017 12:24 AM

## 2017-08-12 NOTE — IP AVS SNAPSHOT
[address] [address]    Phone:  [phone]                                       After Visit Summary   8/12/2017    Kathryn Banks    MRN: [id number]           After Visit Summary Signature Page     I have received my discharge instructions, and my questions have been answered. I have discussed any challenges I see with this plan with the nurse or doctor.    ..........................................................................................................................................  Patient/Patient Representative Signature      ..........................................................................................................................................  Patient Representative Print Name and Relationship to Patient    ..................................................               ................................................  Date                                            Time    ..........................................................................................................................................  Reviewed by Signature/Title    ...................................................              ..............................................  Date                                                            Time

## 2017-08-12 NOTE — H&P
[facility]      MICU History and Physical  Kathryn Banks MRN: [id number]  1940  Date of Admission:8/12/2017  Primary care provider: Dheeraj Jj      Assessment and Plan:     Megan Banks is a 76 yo female with CKD stage IV, atrial fibrillation s/p ablation, DM II, CAD, and HTN who presents from [facility] with acute on chronic kidney disease and metabolic acidosis in the setting of suspected cellulitis vs UTI.     PLAN:  ===NEURO/PSYCH===  # Sedation: None    #Pain:   - Tylenol PRN  - Will continue home tramadol, but renally dose given visual hallucinations with other opioids and inability to take NSAIDs  - Continue gabapentin    ===CARDIOVASCULAR===  # Hx of atrial fibrillation s/p ablation 6/2017: Follows with Dr. King of cardiology and seen last on 8/10. In sinus rhythm, so warfarin discontinued and metoprolol decreased for bradycardia. Currently hemodynamically stable, but in sinus bradycardia.   - Continuous cardiac monitoring overnight  - Hold home metoprolol  - Resume asa tomorrow    # HFpEF: Last echo in 5/31with EF 60-65%, concentric LV hypertrophy, biatrial enlargement, and elevated RV pressures. History of HF exacerbations secondary to non-compliance with home diuresis. Not clinically hypervolemic on examination, however BNP 46,000 and CT with diffuse anasarca.Some concern for cardiorenal syndrome causing MARTÍN on previous admissions. Hypotensive on presentation to ED, but now hemodynamically stable.   - Will diurese gently overnight given hypotension and electrolytes with plan to restart home diuresis in am if improved  - Monitor daily weights  - Monitor I/O    #HTN:   - Hold Metoprolol  - Hold amlodipine    #CAD s/p CABG x 5  - Resume asa tomorrow    #Likely arterial insufficiency: Hx CAD. Cold extremities with diminished pulses and new ulcerations in pattern consistent with arterial etiology.   - Vascular consult on Monday  - LACI's tomorrow    ===PULMONARY===  # Bilateral Small  Pulmonary Effusions in the setting of HF and volume overload:  Blunted left costophrenic angle on XR and bilateral pulmonary effusions on CT. Currently breathing comfortably on room air  - Supplemental 02 as needed to maintain saturations > 92%  - Diurese as able    ===GASTROINTESTINAL===  # Abdominal Pain and Diarrhea: Ongoing for sometime per previous records. Patient notes intermittent hematochezia, but states secondary to hemorrhoids. CT with mesenteric haziness (edema vs enteritis). WBC and lactate normal, so unlikely to be secondary to ischemic process.   - Monitor  - Tylenol as needed    # Nutrition:   - CHO diet    ===RENAL===  UOP: 90cc total today with one unmeasured urination    # Acute Kidney Injury on CKD stage V: Patient followed as outpatient by Dr. Barfield. CKD thought to be multifactorial and secondary to DM, HTN, renovascular disease, and previous MARTÍN. No evidence of structural kidney disease by ultrasound. Most recent episodes secondary to volume overload and aggressive diureses. Baseline Cr of 2.3-2.5, on admission today, found to be 5.18.  Unclear etiology of current decompensation, possibly secondary to HF exacerbation vs infection.   - Nephrology consulted, appreciate recommendations  - No acute indication for HD today  - Will obtain access in am in preparation for dialysis    # Metabolic acidosis, non-anion gap: Likely secondary to hyperchloridemia in setting of acute renal failure. Baseline bicarbonate low at 21, so on sodium bicarbonate tabs TID at home. On arrival, pH of 7.06 with bicarbonate of 10 per VBG. Has received 5 amps of bicarb thus with improvement in blood gases. Last Arterial pH 7.26 with bicarbonate of 15.   - Nephrology consulted, appreciate recommendations.   - VBG Q 4 with goal pH>7.2  - Will replace bicarbonate as needed, but avoid gtt given volume overload  - Restart Sodium Bicarbonate tabs  -Likely to dialyze tomorrow    # Hypernatremia, Hyperchloridemia: Chronic in  setting of chronic diuresis, however, acutely worsened today in setting of worsening renal failure.   - Avoid fluids if able overnight  - Likely to need dialysis in am    #Intermittent Hematuria: Noted in nephrology progress note. UA today with many RBCS, unclear if secondary to possible UTI in setting of supratherapeutic INR or if structural abnormality.   - Will monitor and consider further imaging if needed.     #Hyperkalemia, resolved: Secondary to acidosis and renal failure. Improved with bicarbonate boluses. EKG sinus bradycardia without peaked T waves  - Monitor    # Fluids: None    ===HEME/ONC===  # Anemia of Chronic Disease: Baseline hemoglobin of 7.5. On iron supplementation chronically. Hb on arrival of 7.8.   - Monitor hemoglobin  - Check iron studies  - Transfuse if <7    #Supratherapeutic INR: Patient last reports warfarin taken yesterday despite being discontinued in cardiology clinic 2 days ago .  - Give vitamin K  - Consider FFP prior to procedures  - INR in am    ===ENDOCRINE===  # Type II DM, last A1c 7.3. Not currently on any medications.   - Monitor POCT glucoses    ===INFECTIOUS DISEASE===  # Lower extremity Cellulitis: Patient with what appears to be arterial insufficiency ulcerations with superinfection. Initial concern for deep tissue infection given tense nature of extremities with new ulcerations, however CT demonstrating no facial plane air and bilateral edema.  - Surgery consulted, appreciate assistance with wound management  - Wound culture  - Abx as below  - Obtain Blood cultures  -Trend inflammatory markers    #  Incidental gluteal cyst vs abscess  - Monitor for now  - Surgery to evaluate in am whether patient needs I & D    #Possible UTI:  Urinalysis with many white cells, leukocyte esterase, and blood. Unfortunately,sample taken from bed pan, so likely dirty and unable to get adequate culture. No signs of pyelonephritis per CT, but non-contrast.   - Repeat UA when urine output  improves  - ABX as below    # Enteritis?: Patient with ongoing diarrhea per previous outpatient provider notes. Complains of mild lower quadrant abdominal pain. CT with mesenteric haziness concerning for enteritis.   - Monitor  - If decompensates, add flagyl (PCN allergy) for coverage of abdominal organisms  - Consider stool culture if diarrhea continued    # Antimicrobials:  Vancomycin  Ceftriaxone    ===SKIN/MSK===  # Bilateral Ulcerations, appear arterial  - Surgery to provide wound care instructions   - Antibiotics as above.     LINES: PIV x 2    Prophylaxis:  DVT: Supratherapeutic INR GI: None  Family:  Updated by nursing  Disposition: Stable, but needs ICU observation with consideration of acute dialysis need  Code Status: Full    Patient was seen and discussed with attending physician Dr. Chung, who agrees with above assessment and plan.    Gilda Muro MD  Internal Medicine- Pediatrics PGY3  [phone]         Chief Complaint:   Leg pain         History of Present Illness:   Kathryn Banks is a 77 year old female with chronic lymphedema and stasis ulcers along with multiple other complex medical problems including coronary artery disease s/p CABG x 5, HFpEF (EF 60-65%), chronic kidney disease stage IV, anemia of chronic disease, type 2 diabetes with circulatory complications, and atrial fibrillation s/p recent ablation who presents from [facility] for acute renal failure, acidosis,  and concern for urgent dialysis need.     Kathryn presented to the ED this morning with intractable leg pain. She has had ongoing ulcerations of her lower extremities for the past month, which she reports were absent before then. In the past several days, the wounds have progressed with increased weeping, number of ulcers, swelling, and redness and her pain had become unmanageable at home. She contacted her primary care physician who prescribed tramadol for her, which did help with her pain.     She does have ongoing  diarrhea on chart review and in addition to this has been having some nausea. Despite this, she reports decent oral intake. She denies any associated fevers or ill contacts.     On arrival to the ED, she was noted to be altered and lethargic and mildly hypotensive. Labs were notable for anemia, hypernatremia, hyperkalemia, and hyperchloridemia in addition to elevated creatinine of 5.18. Lactate was normal. BNP was elevated. Venous gas demonstrated acidosis with low bicarbonate. She received four amps of bicarbonate and 500 cc of normal saline prior to transfer to [facility] for consideration of urgent dialysis.          Review of Systems:   Denies chest pain, shortness of breath, burning with urination, joint pains, headaches.          Past Medical History:   Medical History reviewed.   Past Medical History:   Diagnosis Date     Carpal tunnel syndrome      Coronary atherosclerosis of native coronary artery 2006    5 vessel CABG     OBSTRUCTIVE SLEEP APNEA     using CPAP     Osteoarthrosis, unspecified whether generalized or localized, unspecified site     generalized arthritis, particularly in her hands and feet         Other and combined forms of senile cataract 2000    Bilateral     Other and unspecified hyperlipidemia      RESTLESS LEGS SYNDROME      TENOSYNOV HAND/WRIST NEC 6/30/2006     Type II or unspecified type diabetes mellitus without mention of complication, not stated as uncontrolled 2001    Diabetes mellitus/pt is diet controlled with weight loss     Typical atrial flutter (H) 01/17/2007    ablation 6/7/2017     Unspecified essential hypertension              Past Surgical History:   Surgical History reviewed.   Past Surgical History:   Procedure Laterality Date     ANGIOPLASTY       C APPENDECTOMY       C CABG, VEIN, FIVE  12/06    SVG x 4 and LIMA to LAD     C SOTO W/O FACETEC FORAMOT/DSKC 1/2 VRT SEG, LUMBAR  1968     C REMV CATARACT INTRACAP,INSERT LENS      Bilateral     C TOTAL ABDOM HYSTERECTOMY  1995      - fibroids     C VAGOTOMY/PYLOROPLASTY,SELECT  1970     COLONOSCOPY  12/3/2007     COLONOSCOPY  2014    Procedure: COLONOSCOPY;  Colonoscopy;  Surgeon: Zack Stearns MD;  Location:  GI     [facility]OSCOPY  09    [facility]     ENDOSCOPY  2000    Upper GI     HC COLONOSCOPY THRU STOMA, DIAGNOSTIC  10/7/04    poor prep, repeat in 2-3 years     HC COLONOSCOPY W/WO BRUSH/WASH  10/31/07    Repeat-poor quality prep     HC REMOVAL OF OVARY/TUBE(S)      Salpingo-Oophorectomy, Unilateral     HC REVISE MEDIAN N/CARPAL TUNNEL SURG      Carpal tunnel release     HC UGI ENDOSCOPY DIAG W BIOPSY  10/31/07     HC UGI ENDOSCOPY, SIMPLE EXAM  12/3/2007     OPEN REDUCTION INTERNAL FIXATION HIP NAILING Left 7/3/2016    Procedure: OPEN REDUCTION INTERNAL FIXATION HIP NAILING;  Surgeon: Lake Schulz MD;  Location:  OR             Social History:   Social History reviewed.  Social History   Substance Use Topics     Smoking status: Former Smoker     Years: 10.00     Quit date: 1969     Smokeless tobacco: Never Used     Alcohol use 0.0 oz/week     0 Standard drinks or equivalent per week      Comment: occasional- 2 drinks per month             Family History:   Family History reviewed.   Family History   Problem Relation Age of Onset     DIABETES Father      AODM     Neurologic Disorder Father      Parkinson's     Blood Disease Father       from blood clot from leg to lung immediately after hip fracture     DIABETES Paternal Grandmother      adult onset     DIABETES Maternal Grandmother      adult onset     Hypertension No family hx of      CEREBROVASCULAR DISEASE No family hx of              Allergies:     Allergies   Allergen Reactions     Ciprofloxacin Nausea and Vomiting     Zofran did not help     Oxycodone Visual Disturbance     Delusions, blackouts      Lisinopril Cough     Penicillins Rash     Sulfa Drugs GI Disturbance     LOSS OF TASTE             Medications:   Medications Reviewed.   Current  Facility-Administered Medications   Medication     naloxone (NARCAN) injection 0.1-0.4 mg     cefTRIAXone (ROCEPHIN) 2 g vial to attach to  ml bag for ADULTS or NS 50 ml bag for PEDS     vancomycin (VANCOCIN) 2,000 mg in NaCl 0.9 % 500 mL intermittent infusion     vancomycin place maki - receiving intermittent dosing     sodium bicarbonate 8.4 % injection 50 mEq     phytonadione (AQUA-MEPHYTON) 5 mg in NaCl 0.9 % 50 mL intermittent infusion             Physical Exam:   Vitals were reviewed.  Blood pressure 120/64, temperature 97.6  F (36.4  C), temperature source Axillary, resp. rate 8, weight 83.3 kg (183 lb 11.2 oz), SpO2 99 %.    General: Alert, interactive, NAD. Intermittently confused, but re-directable  Skin: warm and dry, no jaundice or rashes. Occasional ecchymoses. Multiple shallow based ulcerations of lower extremities to knees bilaterally with superimposed pain to palpation and erythema .   HEENT: Atraumatic, MMM, PERRLA, EOMI, neck supple  CV: Bradycardic, no murmur, rubs, or gallops  Resp: Clear to auscultation bilaterally, no wheezes, crackles  Abd: Soft, non-distended, BS+, no masses appreciated. Mild TTP over LLQ  Extremities: Feet cool to touch. Pulses diminished but palpable. Difficult to appreciate edema given tightness of skin on lower extremities extremities.   Neuro: alert and oriented, no lateralizing symptoms or focal neurologic deficits  Psych: Intermittent visual hallucinations.          Data:        ROUTINE LABS (Last four results)  CMP  Recent Labs  Lab 08/12/17  1616 08/12/17  1013   * 146*   POTASSIUM 5.2 5.6*   CHLORIDE 125* 124*   CO2 14* 13*   ANIONGAP 10 9   * 136*   BUN 41* 42*   CR 5.07* 5.18*   GFRESTIMATED 8* 8*   GFRESTBLACK 10* 10*   MALICK 6.4* 6.4*   MAG 2.0  --    PHOS 6.6*  --    PROTTOTAL 4.7* 5.0*   ALBUMIN 1.9* 2.1*   BILITOTAL 0.2 0.2   ALKPHOS 216* 228*   AST 32 45   ALT 44 49     CBC  Recent Labs  Lab 08/12/17  1616 08/12/17  1013   WBC 7.3 9.4    RBC 3.24* 3.38*   HGB 7.8* 8.2*   HCT 25.9* 27.9*   MCV 80 83   MCH 24.1* 24.3*   MCHC 30.1* 29.4*   RDW 21.6* 22.1*    286     INR  Recent Labs  Lab 08/12/17  1616 08/09/17 08/07/17   INR 5.47* 3.8 3.8     Arterial Blood Gas  Recent Labs  Lab 08/12/17  1744 08/12/17  1616 08/12/17  1320 08/12/17  1013   PH 7.26*  --   --   --    PCO2 34*  --   --   --    PO2 92  --   --   --    HCO3 15*  --   --   --    O2PER 21.0 21.0 21 21

## 2017-08-12 NOTE — TELEPHONE ENCOUNTER
The wound nurse, Suhail, has been out to see her on Tuesday, 8/8/17. I agree that she has had cellulitis in the past several times and is at high risk for reoccurrence. I know that Suhail was going to order the supplies for the unna boots but they have not arrived as of yet. Would you prefer to look at her legs when we meet on Tuesday, 8/15 at 4pm before there are any tx changes?? Currently, we are applying Sween cream to intact skin and then wrapping legs with diapers and Kerlix to manage the drainage. Please let me know and I can relay a message to Suhail regarding POC.

## 2017-08-12 NOTE — CONSULTS
General Surgery Consult Note  Staff: Dr. Stratton  Date: 8/12/2017   Consulted for: Leg ulcers by Dr. Muro    Assessment/Plan:  77 year old female with CAD s/o CABG x5, ANABEL on CPAP, CKD, DM, HTN, HLD, afib on warfarin s/p ablation with arterial insufficiency ulcers that may be superinfected with cellulitis. Patient's clinical course not consistent with necrotizing soft tissue infection. No indication for operative intervention at this time.  - LACI's tomorrow  - Recommend vascular surgery consult on Monday  - will use vascular dressing for wounds: daily PCMX wash, place xeroform on wounds, then ABDs, then lightly wrap kerlex, and finally lightly wrap ACE  - Elevate legs as much as possible  - Agree with current abx for cellulitis. If clinically worsens, could consider switching to merrem  - General surgery to follow.    Discussed with Amy Garland MD  General Surgery PGY-3    Addenda  DOS: 8/13/2017    Patient seen and examined this am. Chronic bilateral leg ulcers from venous insufficiency that have worsened symptomatically prompting hospitalization. Also with new MARTÍN in setting of CKD. Suspect some component of arterial insufficiency that has not completed work up. No signs of end digit ischemia on exam. Minimal erythema on today's exam. No pain with active dorsi/plantar flexion. Do not suspect necrotizing soft tissue infection based on aforementioned findings. Normal wbc and afebrile. I do not think she needs antibiotics. Would not recommend drainage of gluteal cyst. Will defer antibiotics to primary. Recommend WOCN and vascular surgery consult in am. Surgery will sign off. Please call if questions.      Seen with staff, Dr. Viral Hernandez Ma, MD  Surgery PGY-5  [phone]    ------------------------------------------  HPI:   Kathryn Banks is a 77 year old female with CAD s/o CABG x5, ANABEL on CPAP, CKD, DM, HTN, HLD, afib on warfarin s/p ablation with arterial insufficiency ulcers  that may be superinfected with cellulitis.  She has had the ulcers for 1 month and her PCP has been managing them with a home health RN. Denies trauma. The wounds have not been open, but now they are open, red, swollen, and painful. She also endorses 1-2 months of toe numbness. Denies fevers, chill, CP, SOB, or abdominal pain. He last at when he got to the ED.  ?  PMH:  Past Medical History:   Diagnosis Date     Carpal tunnel syndrome      Coronary atherosclerosis of native coronary artery 2006    5 vessel CABG     OBSTRUCTIVE SLEEP APNEA     using CPAP     Osteoarthrosis, unspecified whether generalized or localized, unspecified site     generalized arthritis, particularly in her hands and feet         Other and combined forms of senile cataract 2000    Bilateral     Other and unspecified hyperlipidemia      RESTLESS LEGS SYNDROME      TENOSYNOV HAND/WRIST NEC 6/30/2006     Type II or unspecified type diabetes mellitus without mention of complication, not stated as uncontrolled 2001    Diabetes mellitus/pt is diet controlled with weight loss     Typical atrial flutter (H) 01/17/2007    ablation 6/7/2017     Unspecified essential hypertension         PSHx:  Past Surgical History:   Procedure Laterality Date     ANGIOPLASTY       C APPENDECTOMY       C CABG, VEIN, FIVE  12/06    SVG x 4 and LIMA to LAD     C SOTO W/O FACETEC FORAMOT/DSKC 1/2 VRT SEG, LUMBAR  1968     C REMV CATARACT INTRACAP,INSERT LENS      Bilateral     C TOTAL ABDOM HYSTERECTOMY  1995     - fibroids     C VAGOTOMY/PYLOROPLASTY,SELECT  1970     COLONOSCOPY  12/3/2007     COLONOSCOPY  1/17/2014    Procedure: COLONOSCOPY;  Colonoscopy;  Surgeon: Zack Stearns MD;  Location:  GI     [facility]  11/25/09    [facility]     ENDOSCOPY  03/21/2000    Upper GI     HC COLONOSCOPY THRU STOMA, DIAGNOSTIC  10/7/04    poor prep, repeat in 2-3 years     HC COLONOSCOPY W/WO BRUSH/WASH  10/31/07    Repeat-poor quality prep     HC REMOVAL OF OVARY/TUBE(S)       Salpingo-Oophorectomy, Unilateral     HC REVISE MEDIAN N/CARPAL TUNNEL SURG      Carpal tunnel release     HC UGI ENDOSCOPY DIAG W BIOPSY  10/31/07     HC UGI ENDOSCOPY, SIMPLE EXAM  12/3/2007     OPEN REDUCTION INTERNAL FIXATION HIP NAILING Left 7/3/2016    Procedure: OPEN REDUCTION INTERNAL FIXATION HIP NAILING;  Surgeon: Lake Schulz MD;  Location: [facility] OR        Medications:  traMADol (ULTRAM) 50 MG tablet Take 1-2 tablets ( mg) by mouth every 6 hours as needed for pain maximum 3 tablet(s) per day   metoprolol (LOPRESSOR) 50 MG tablet Take 0.5 tablets (25 mg) by mouth 2 times daily   aspirin 81 MG tablet Take 1 tablet (81 mg) by mouth daily   amLODIPine (NORVASC) 5 MG tablet Take 2 tablets (10 mg) by mouth daily   sodium bicarbonate 650 MG tablet Take 1 tablet (650 mg) by mouth 3 times daily   calcium acetate (PHOSLO) 667 MG CAPS capsule Take 2 capsules (1,334 mg) by mouth 3 times daily (with meals)   furosemide (LASIX) 40 MG tablet Take 1 tablet (40 mg) by mouth daily   pravastatin (PRAVACHOL) 40 MG tablet Take 1 tablet (40 mg) by mouth daily   acetaminophen (TYLENOL) 325 MG tablet Take 2 tablets (650 mg) by mouth every 4 hours as needed for mild pain   nitroglycerin (NITROSTAT) 0.4 MG sublingual tablet Place 1 tablet (0.4 mg) under the tongue every 5 minutes as needed for chest pain (Patient not taking: Reported on 8/10/2017)   gabapentin (NEURONTIN) 300 MG capsule Take 1 capsule (300 mg) by mouth At Bedtime (Patient taking differently: Take 300 mg by mouth 2 times daily )   pramipexole (MIRAPEX) 0.5 MG tablet Take 3 tablets (1.5 mg) by mouth every evening   ferrous sulfate (IRON) 325 (65 FE) MG tablet Take 1 tablet (325 mg) by mouth daily (with breakfast)   calcium carbonate-vitamin D 500-400 MG-UNIT TABS per tablet Take 1 tablet by mouth daily   cyanocobalamin (VITAMIN B12) 1000 MCG/ML injection Inject 1 mL (1,000 mcg) into the muscle every 30 days   ORDER FOR DME Equipment being ordered: cpap  machine, mask, humidifier, tubing and filters       Allergies:   Ciprofloxacin; Oxycodone; Lisinopril; Penicillins; and Sulfa drugs     SocHx:  Social History     Marital status:      Spouse name: Urbano Banks     Number of children: 2     Years of education: 13     Occupational History     Ministry coordinator      [employer]     Social History Main Topics     Smoking status: Former Smoker     Years: 10.00     Quit date: 1969     Smokeless tobacco: Never Used     Alcohol use 0.0 oz/week     0 Standard drinks or equivalent per week      Comment: occasional- 2 drinks per month     Drug use: No     Sexual activity: Yes     Birth control/ protection: Surgical     Other Topics Concern     Parent/Sibling W/ Cabg, Mi Or Angioplasty Before 65f 55m? No       FamHx:  Family History   Problem Relation Age of Onset     DIABETES Father      AODM     Neurologic Disorder Father      Parkinson's     Blood Disease Father       from blood clot from leg to lung immediately after hip fracture     DIABETES Paternal Grandmother      adult onset     DIABETES Maternal Grandmother      adult onset     Hypertension No family hx of      CEREBROVASCULAR DISEASE No family hx of     Negative for bleeding disorders, clotting disorders, or problems with anesthesia    Review of Systems:  ROS: 10 point ROS neg other than the symptoms noted above in the HPI.    Physical Examination   /64  Temp 97.6  F (36.4  C) (Axillary)  Resp 8  Wt 83.3 kg (183 lb 11.2 oz)  SpO2 99%  BMI 31.53 kg/m2  General: Awake and alert. NAD  Pulm: Non labored breathing, no tachypnea   CV: RRR  Abdomen: soft, non-tender, no peritoneal signs  Musculoskel/Extremities: BLE with multiple small clean based ulcers with surrounding erythema, no fluctuance or crepitus, no discharge or odor; distal strength, sensation and ROM intact  Skin: no rashes, no diaphoresis and skin color normal     Labs: Reviewed  Cr 5.13    WBC 7    UA  RESULTS:  Recent Labs   Lab Test  08/12/17   1800   07/02/16   0224   COLOR  Yellow   < >  Yellow   APPEARANCE  Cloudy   < >  Clear   URINEGLC  Negative   < >  Negative   URINEBILI  Negative   < >  Negative   URINEKETONE  Negative   < >  Negative   SG  1.015   < >  1.020   UBLD  Large*   < >  Trace*   URINEPH  5.5   < >  6.0   PROTEIN  100*   < >  Negative   UROBILINOGEN   --    --   0.2   NITRITE  Negative   < >  Negative   LEUKEST  Large*   < >  Negative   RBCU  >182*   < >  2-5*   WBCU  2568*   < >  O - 2    < > = values in this interval not displayed.     Imaging: Reviewed  CT Abd/Pelvis  1. Findings of volume overload, including small bilateral pleural effusions, diffuse haziness of the mesentery, and diffuse anasarca. Enteritis is also a diagnostic consideration given the mesentery findings.  2. No bowel obstruction or features to suggest bowel ischemia on this noncontrast examination. No pneumatosis or portal venous gas.  3. Mild urinary bladder wall thickening with subtle surrounding inflammation, correlation with UA could be considered.  4. 5.4 x 6.1 x 9.7 cm simple attenuation fluid collection within the subcutaneous tissues of the lateral left buttock. Superimposed infection cannot be excluded.  5. Cholelithiasis.    CT BLE  Nonspecific diffuse edema throughout both ankles and feet were visualized. No gas within the soft tissues to specifically suggest necrotizing fasciitis

## 2017-08-12 NOTE — ED NOTES
Medication reconciliation not finished in triage. [facility] nurse sets them up and pt does not know what they are.

## 2017-08-13 ENCOUNTER — APPOINTMENT (OUTPATIENT)
Dept: CARDIOLOGY | Facility: CLINIC | Age: 77
DRG: 291 | End: 2017-08-13
Attending: INTERNAL MEDICINE
Payer: MEDICARE

## 2017-08-13 ENCOUNTER — APPOINTMENT (OUTPATIENT)
Dept: ULTRASOUND IMAGING | Facility: CLINIC | Age: 77
DRG: 291 | End: 2017-08-13
Attending: INTERNAL MEDICINE
Payer: MEDICARE

## 2017-08-13 LAB
ALBUMIN UR-MCNC: 100 MG/DL
ANION GAP SERPL CALCULATED.3IONS-SCNC: 9 MMOL/L (ref 3–14)
APPEARANCE UR: ABNORMAL
BACTERIA #/AREA URNS HPF: ABNORMAL /HPF
BASE DEFICIT BLDV-SCNC: 10 MMOL/L
BASE DEFICIT BLDV-SCNC: 11 MMOL/L
BASE DEFICIT BLDV-SCNC: 12.4 MMOL/L
BILIRUB UR QL STRIP: NEGATIVE
BUN SERPL-MCNC: 44 MG/DL (ref 7–30)
CA-I BLD-MCNC: 3.8 MG/DL (ref 4.4–5.2)
CA-I BLD-MCNC: 4 MG/DL (ref 4.4–5.2)
CA-I BLD-MCNC: 4.1 MG/DL (ref 4.4–5.2)
CA-I BLD-MCNC: 4.3 MG/DL (ref 4.4–5.2)
CALCIUM SERPL-MCNC: 6.4 MG/DL (ref 8.5–10.1)
CHLORIDE SERPL-SCNC: 121 MMOL/L (ref 94–109)
CK SERPL-CCNC: 174 U/L (ref 30–225)
CK SERPL-CCNC: 174 U/L (ref 30–225)
CO2 SERPL-SCNC: 18 MMOL/L (ref 20–32)
COLOR UR AUTO: ABNORMAL
CREAT SERPL-MCNC: 5.29 MG/DL (ref 0.52–1.04)
CRP SERPL-MCNC: 30 MG/L (ref 0–8)
CRP SERPL-MCNC: 30 MG/L (ref 0–8)
ERYTHROCYTE [DISTWIDTH] IN BLOOD BY AUTOMATED COUNT: 21.9 % (ref 10–15)
ERYTHROCYTE [SEDIMENTATION RATE] IN BLOOD BY WESTERGREN METHOD: 92 MM/H (ref 0–30)
ERYTHROCYTE [SEDIMENTATION RATE] IN BLOOD BY WESTERGREN METHOD: 92 MM/H (ref 0–30)
FERRITIN SERPL-MCNC: 188 NG/ML (ref 8–252)
GFR SERPL CREATININE-BSD FRML MDRD: 8 ML/MIN/1.7M2
GLUCOSE BLDC GLUCOMTR-MCNC: 104 MG/DL (ref 70–99)
GLUCOSE BLDC GLUCOMTR-MCNC: 142 MG/DL (ref 70–99)
GLUCOSE BLDC GLUCOMTR-MCNC: 144 MG/DL (ref 70–99)
GLUCOSE BLDC GLUCOMTR-MCNC: 288 MG/DL (ref 70–99)
GLUCOSE SERPL-MCNC: 120 MG/DL (ref 70–99)
GLUCOSE UR STRIP-MCNC: NEGATIVE MG/DL
HCO3 BLDV-SCNC: 15 MMOL/L (ref 21–28)
HCO3 BLDV-SCNC: 16 MMOL/L (ref 21–28)
HCO3 BLDV-SCNC: 16 MMOL/L (ref 21–28)
HCT VFR BLD AUTO: 24 % (ref 35–47)
HGB BLD-MCNC: 7.3 G/DL (ref 11.7–15.7)
HGB UR QL STRIP: ABNORMAL
INR PPP: 2.35 (ref 0.86–1.14)
IRON SATN MFR SERPL: 17 % (ref 15–46)
IRON SERPL-MCNC: 20 UG/DL (ref 35–180)
KETONES UR STRIP-MCNC: NEGATIVE MG/DL
LACTATE BLD-SCNC: 0.8 MMOL/L (ref 0.7–2.1)
LACTATE BLD-SCNC: 1.2 MMOL/L (ref 0.7–2.1)
LEUKOCYTE ESTERASE UR QL STRIP: ABNORMAL
MAGNESIUM SERPL-MCNC: 1.8 MG/DL (ref 1.6–2.3)
MAGNESIUM SERPL-MCNC: 1.8 MG/DL (ref 1.6–2.3)
MAGNESIUM SERPL-MCNC: 1.9 MG/DL (ref 1.6–2.3)
MCH RBC QN AUTO: 24.3 PG (ref 26.5–33)
MCHC RBC AUTO-ENTMCNC: 30.4 G/DL (ref 31.5–36.5)
MCV RBC AUTO: 80 FL (ref 78–100)
NITRATE UR QL: NEGATIVE
O2/TOTAL GAS SETTING VFR VENT: 21 %
PCO2 BLDV: 37 MM HG (ref 40–50)
PH BLDV: 7.2 PH (ref 7.32–7.43)
PH BLDV: 7.23 PH (ref 7.32–7.43)
PH BLDV: 7.25 PH (ref 7.32–7.43)
PH UR STRIP: 5.5 PH (ref 5–7)
PHOSPHATE SERPL-MCNC: 6.4 MG/DL (ref 2.5–4.5)
PLATELET # BLD AUTO: 216 10E9/L (ref 150–450)
PO2 BLDV: 31 MM HG (ref 25–47)
PO2 BLDV: 47 MM HG (ref 25–47)
PO2 BLDV: 54 MM HG (ref 25–47)
POTASSIUM SERPL-SCNC: 5.1 MMOL/L (ref 3.4–5.3)
RBC # BLD AUTO: 3.01 10E12/L (ref 3.8–5.2)
RBC #/AREA URNS AUTO: 28 /HPF (ref 0–2)
SODIUM SERPL-SCNC: 148 MMOL/L (ref 133–144)
SP GR UR STRIP: 1.02 (ref 1–1.03)
TIBC SERPL-MCNC: 112 UG/DL (ref 240–430)
TRANSFERRIN SERPL-MCNC: 83 MG/DL (ref 210–360)
URN SPEC COLLECT METH UR: ABNORMAL
UROBILINOGEN UR STRIP-MCNC: NORMAL MG/DL (ref 0–2)
VANCOMYCIN SERPL-MCNC: 17.2 MG/L
WBC # BLD AUTO: 7 10E9/L (ref 4–11)
WBC #/AREA URNS AUTO: 4649 /HPF (ref 0–2)
WBC CLUMPS #/AREA URNS HPF: PRESENT /HPF

## 2017-08-13 PROCEDURE — A9270 NON-COVERED ITEM OR SERVICE: HCPCS | Mod: GY | Performed by: STUDENT IN AN ORGANIZED HEALTH CARE EDUCATION/TRAINING PROGRAM

## 2017-08-13 PROCEDURE — 80202 ASSAY OF VANCOMYCIN: CPT | Performed by: INTERNAL MEDICINE

## 2017-08-13 PROCEDURE — 82330 ASSAY OF CALCIUM: CPT | Performed by: INTERNAL MEDICINE

## 2017-08-13 PROCEDURE — 82330 ASSAY OF CALCIUM: CPT | Performed by: STUDENT IN AN ORGANIZED HEALTH CARE EDUCATION/TRAINING PROGRAM

## 2017-08-13 PROCEDURE — 82803 BLOOD GASES ANY COMBINATION: CPT | Performed by: STUDENT IN AN ORGANIZED HEALTH CARE EDUCATION/TRAINING PROGRAM

## 2017-08-13 PROCEDURE — 25000132 ZZH RX MED GY IP 250 OP 250 PS 637: Mod: GY | Performed by: STUDENT IN AN ORGANIZED HEALTH CARE EDUCATION/TRAINING PROGRAM

## 2017-08-13 PROCEDURE — 82803 BLOOD GASES ANY COMBINATION: CPT | Performed by: INTERNAL MEDICINE

## 2017-08-13 PROCEDURE — 25000128 H RX IP 250 OP 636: Performed by: INTERNAL MEDICINE

## 2017-08-13 PROCEDURE — 12000008 ZZH R&B INTERMEDIATE UMMC

## 2017-08-13 PROCEDURE — 83540 ASSAY OF IRON: CPT | Performed by: INTERNAL MEDICINE

## 2017-08-13 PROCEDURE — 99233 SBSQ HOSP IP/OBS HIGH 50: CPT | Mod: GC | Performed by: INTERNAL MEDICINE

## 2017-08-13 PROCEDURE — 40000264 ECHO COMPLETE WITH OPTISON

## 2017-08-13 PROCEDURE — 81001 URINALYSIS AUTO W/SCOPE: CPT | Performed by: INTERNAL MEDICINE

## 2017-08-13 PROCEDURE — 25000128 H RX IP 250 OP 636: Performed by: STUDENT IN AN ORGANIZED HEALTH CARE EDUCATION/TRAINING PROGRAM

## 2017-08-13 PROCEDURE — 85610 PROTHROMBIN TIME: CPT | Performed by: INTERNAL MEDICINE

## 2017-08-13 PROCEDURE — 83605 ASSAY OF LACTIC ACID: CPT | Performed by: INTERNAL MEDICINE

## 2017-08-13 PROCEDURE — 85027 COMPLETE CBC AUTOMATED: CPT | Performed by: INTERNAL MEDICINE

## 2017-08-13 PROCEDURE — 80048 BASIC METABOLIC PNL TOTAL CA: CPT | Performed by: INTERNAL MEDICINE

## 2017-08-13 PROCEDURE — 83735 ASSAY OF MAGNESIUM: CPT | Performed by: INTERNAL MEDICINE

## 2017-08-13 PROCEDURE — 87186 SC STD MICRODIL/AGAR DIL: CPT | Performed by: INTERNAL MEDICINE

## 2017-08-13 PROCEDURE — 99291 CRITICAL CARE FIRST HOUR: CPT | Mod: GC | Performed by: INTERNAL MEDICINE

## 2017-08-13 PROCEDURE — 86140 C-REACTIVE PROTEIN: CPT | Performed by: INTERNAL MEDICINE

## 2017-08-13 PROCEDURE — 25500064 ZZH RX 255 OP 636: Performed by: INTERNAL MEDICINE

## 2017-08-13 PROCEDURE — 85652 RBC SED RATE AUTOMATED: CPT | Performed by: INTERNAL MEDICINE

## 2017-08-13 PROCEDURE — 82550 ASSAY OF CK (CPK): CPT | Performed by: INTERNAL MEDICINE

## 2017-08-13 PROCEDURE — 36415 COLL VENOUS BLD VENIPUNCTURE: CPT | Performed by: STUDENT IN AN ORGANIZED HEALTH CARE EDUCATION/TRAINING PROGRAM

## 2017-08-13 PROCEDURE — 84466 ASSAY OF TRANSFERRIN: CPT | Performed by: INTERNAL MEDICINE

## 2017-08-13 PROCEDURE — 87088 URINE BACTERIA CULTURE: CPT | Performed by: INTERNAL MEDICINE

## 2017-08-13 PROCEDURE — 82728 ASSAY OF FERRITIN: CPT | Performed by: INTERNAL MEDICINE

## 2017-08-13 PROCEDURE — 83550 IRON BINDING TEST: CPT | Performed by: INTERNAL MEDICINE

## 2017-08-13 PROCEDURE — 36415 COLL VENOUS BLD VENIPUNCTURE: CPT | Performed by: INTERNAL MEDICINE

## 2017-08-13 PROCEDURE — 00000146 ZZHCL STATISTIC GLUCOSE BY METER IP

## 2017-08-13 PROCEDURE — 76770 US EXAM ABDO BACK WALL COMP: CPT

## 2017-08-13 PROCEDURE — 93306 TTE W/DOPPLER COMPLETE: CPT | Mod: 26 | Performed by: INTERNAL MEDICINE

## 2017-08-13 PROCEDURE — 87086 URINE CULTURE/COLONY COUNT: CPT | Performed by: INTERNAL MEDICINE

## 2017-08-13 PROCEDURE — 25000132 ZZH RX MED GY IP 250 OP 250 PS 637: Mod: GY | Performed by: INTERNAL MEDICINE

## 2017-08-13 PROCEDURE — 84100 ASSAY OF PHOSPHORUS: CPT | Performed by: INTERNAL MEDICINE

## 2017-08-13 RX ORDER — AMPICILLIN AND SULBACTAM 2; 1 G/1; G/1
3 INJECTION, POWDER, FOR SOLUTION INTRAMUSCULAR; INTRAVENOUS EVERY 24 HOURS
Status: DISCONTINUED | OUTPATIENT
Start: 2017-08-13 | End: 2017-08-13

## 2017-08-13 RX ORDER — FERROUS SULFATE 325(65) MG
325 TABLET ORAL DAILY
Status: DISCONTINUED | OUTPATIENT
Start: 2017-08-13 | End: 2017-08-18 | Stop reason: HOSPADM

## 2017-08-13 RX ORDER — NICOTINE POLACRILEX 4 MG
15-30 LOZENGE BUCCAL
Status: DISCONTINUED | OUTPATIENT
Start: 2017-08-13 | End: 2017-08-18 | Stop reason: HOSPADM

## 2017-08-13 RX ORDER — PIPERACILLIN SODIUM, TAZOBACTAM SODIUM 2; .25 G/10ML; G/10ML
2.25 INJECTION, POWDER, LYOPHILIZED, FOR SOLUTION INTRAVENOUS EVERY 8 HOURS SCHEDULED
Status: DISCONTINUED | OUTPATIENT
Start: 2017-08-13 | End: 2017-08-15

## 2017-08-13 RX ORDER — DEXTROSE MONOHYDRATE 25 G/50ML
25-50 INJECTION, SOLUTION INTRAVENOUS
Status: DISCONTINUED | OUTPATIENT
Start: 2017-08-13 | End: 2017-08-18 | Stop reason: HOSPADM

## 2017-08-13 RX ADMIN — VANCOMYCIN HYDROCHLORIDE 1250 MG: 1 INJECTION, POWDER, LYOPHILIZED, FOR SOLUTION INTRAVENOUS at 21:31

## 2017-08-13 RX ADMIN — Medication 50 MEQ: at 22:58

## 2017-08-13 RX ADMIN — HUMAN ALBUMIN MICROSPHERES AND PERFLUTREN 3 ML: 10; .22 INJECTION, SOLUTION INTRAVENOUS at 12:00

## 2017-08-13 RX ADMIN — SODIUM CHLORIDE, POTASSIUM CHLORIDE, SODIUM LACTATE AND CALCIUM CHLORIDE 250 ML: 600; 310; 30; 20 INJECTION, SOLUTION INTRAVENOUS at 02:24

## 2017-08-13 RX ADMIN — SODIUM BICARBONATE 650 MG TABLET 650 MG: at 09:09

## 2017-08-13 RX ADMIN — CALCIUM GLUCONATE 1 G: 94 INJECTION, SOLUTION INTRAVENOUS at 04:21

## 2017-08-13 RX ADMIN — GABAPENTIN 300 MG: 300 CAPSULE ORAL at 21:36

## 2017-08-13 RX ADMIN — FERROUS SULFATE TAB 325 MG (65 MG ELEMENTAL FE) 325 MG: 325 (65 FE) TAB at 09:34

## 2017-08-13 RX ADMIN — CALCIUM GLUCONATE 1 G: 94 INJECTION, SOLUTION INTRAVENOUS at 09:21

## 2017-08-13 RX ADMIN — PIPERACILLIN AND TAZOBACTAM 2.25 G: 2; .25 INJECTION, POWDER, LYOPHILIZED, FOR SOLUTION INTRAVENOUS; PARENTERAL at 19:50

## 2017-08-13 RX ADMIN — SODIUM BICARBONATE 650 MG TABLET 650 MG: at 19:50

## 2017-08-13 RX ADMIN — SODIUM BICARBONATE 650 MG TABLET 650 MG: at 14:47

## 2017-08-13 RX ADMIN — TRAMADOL HYDROCHLORIDE 25 MG: 50 TABLET, FILM COATED ORAL at 12:20

## 2017-08-13 RX ADMIN — CALCIUM ACETATE 667 MG: 667 CAPSULE ORAL at 19:50

## 2017-08-13 RX ADMIN — AMPICILLIN SODIUM AND SULBACTAM SODIUM 3 G: 2; 1 INJECTION, POWDER, FOR SOLUTION INTRAMUSCULAR; INTRAVENOUS at 12:07

## 2017-08-13 RX ADMIN — CALCIUM GLUCONATE 2 G: 94 INJECTION, SOLUTION INTRAVENOUS at 17:04

## 2017-08-13 RX ADMIN — CALCIUM GLUCONATE 1 G: 94 INJECTION, SOLUTION INTRAVENOUS at 01:55

## 2017-08-13 ASSESSMENT — ACTIVITIES OF DAILY LIVING (ADL)
TOILETING: 1-->ASSISTIVE EQUIPMENT
FALL_HISTORY_WITHIN_LAST_SIX_MONTHS: NO
RETIRED_COMMUNICATION: 0-->UNDERSTANDS/COMMUNICATES WITHOUT DIFFICULTY
AMBULATION: 1-->ASSISTIVE EQUIPMENT
BATHING: 1-->ASSISTIVE EQUIPMENT
EATING: 0-->INDEPENDENT
DRESS: 1-->ASSISTIVE EQUIPMENT
COMMUNICATION: 0-->UNDERSTANDS/COMMUNICATES WITHOUT DIFFICULTY
DRESS: 2-->ASSISTIVE PERSON
TRANSFERRING: 1-->ASSISTIVE EQUIPMENT
CHANGE_IN_FUNCTIONAL_STATUS_SINCE_ONSET_OF_CURRENT_ILLNESS/INJURY: YES
SWALLOWING: 0-->SWALLOWS FOODS/LIQUIDS WITHOUT DIFFICULTY
SWALLOWING: 0-->SWALLOWS FOODS/LIQUIDS WITHOUT DIFFICULTY
TRANSFERRING: 3-->ASSISTIVE EQUIPMENT AND PERSON
BATHING: 2-->ASSISTIVE PERSON
TOILETING: 3-->ASSISTIVE EQUIPMENT AND PERSON
AMBULATION: 3-->ASSISTIVE EQUIPMENT AND PERSON
RETIRED_EATING: 0-->INDEPENDENT
COGNITION: 0 - NO COGNITION ISSUES REPORTED

## 2017-08-13 NOTE — PLAN OF CARE
Problem: Goal Outcome Summary  Goal: Goal Outcome Summary     Pt is A/O x 4, VS stable. XV=183/72 (85). HR is NSR at 62, no ectopy.  Pt is on room air with O2 sats 94-97%. Activity as tolerated. Pt ambulates with walker and assist of 1. Renal Diet, pt has a good appetite. Pt uses call light and voids to commode or bedpan. Pt voided x 2 this shift. Urine is milky green in color with a strong odor. UA/UC sent. Pt is on Vanco and Zosyn. Last BM was 8/12/17. BLE leg wraps for multiple stasis ulcers and edema.   Plan is for pt to go to IR on Monday for tunneled dialysis line placement, then receive HD. Pt is now stable and has orders to transfer to 6B.

## 2017-08-13 NOTE — PLAN OF CARE
Problem: Goal Outcome Summary  Goal: Goal Outcome Summary     Pt c/o lenka area pain while wiping after voiding. Washed and dried lenka area and examined pt. Pt appears to possibly have multiple rectal polyps. Additionally, extensive lenka edema and labia edema, with generalized red excoriation. Will contact [facility] for follow up and to request miconazole powder.

## 2017-08-13 NOTE — PROGRESS NOTES
MICU Progress Note  Date of Service: August 13, 2017            Assessment and Plan:   Megan Banks is a 76 yo female with CKD stage IV, atrial fibrillation s/p ablation, DM II, CAD, and HTN who presents from [facility] with acute on chronic kidney disease and metabolic acidosis in the setting of suspected cellulitis vs UTI.      PLAN:  ===NEURO/PSYCH===  # Sedation: None     #Pain:   - Tylenol PRN  - Will continue home tramadol, but renally dose given visual hallucinations with other opioids and inability to take NSAIDs  - Continue gabapentin     ===CARDIOVASCULAR===  # Hx of atrial fibrillation s/p ablation 6/2017: Follows with Dr. King of cardiology and seen last on 8/10. In sinus rhythm, so warfarin discontinued and metoprolol decreased for bradycardia. Currently hemodynamically stable, but in sinus bradycardia.   - Continuous cardiac monitoring overnight  - Hold home metoprolol in setting of hypotension     # HFpEF: Last echo in 5/31with EF 60-65%, concentric LV hypertrophy, biatrial enlargement, and elevated RV pressures. History of HF exacerbations secondary to non-compliance with home diuresis. Not clinically hypervolemic on examination, however BNP 46,000 and CT with diffuse anasarca.Some concern for cardiorenal syndrome causing MARTÍN on previous admissions. Hypotensive on presentation to ED, but now hemodynamically stable.   - Will diurese gently overnight given hypotension and electrolytes with plan to restart home diuresis in am if improved  - Monitor daily weights  - Monitor I/O     #Hypotension: Suspect secondary to vasculopathy and inability to get adequate readings as BP improved with transition of cuff to other arm. Patient mentating well.   - Hold Metoprolol  - Hold amlodipine  - Monitor BP closely     #CAD s/p CABG x 5  - Will resume asa tomorrow after procedure     #Likely arterial insufficiency: Hx CAD. Cold extremities with diminished pulses and new ulcerations in pattern consistent with  arterial etiology.   - Vascular consult on Monday  - LACI's today     ===PULMONARY===  # Bilateral Small Pulmonary Effusions in the setting of HF and volume overload:  Blunted left costophrenic angle on XR and bilateral pulmonary effusions on CT. Currently breathing comfortably on room air  - Supplemental 02 as needed to maintain saturations > 92%  - Diurese as able     ===GASTROINTESTINAL===  # Abdominal Pain and Diarrhea, improved: Ongoing for sometime per previous records. Patient notes intermittent hematochezia, but states secondary to hemorrhoids. CT with mesenteric haziness (edema vs enteritis). WBC and lactate normal, so unlikely to be secondary to ischemic process.   - Monitor  - Tylenol as needed     # Nutrition:   - Renal Diet     ===RENAL===  UOP: 125 cc since midnight     # Acute Kidney Injury on CKD stage V: Patient followed as outpatient by Dr. Barfield. CKD thought to be multifactorial and secondary to DM, HTN, renovascular disease, and previous MARTÍN. No evidence of structural kidney disease by ultrasound. Most recent episodes secondary to volume overload and aggressive diureses. Baseline Cr of 2.3-2.5, on admission today, found to be 5.18.  Unclear etiology of current decompensation, possibly secondary to HF exacerbation vs infection.   - Nephrology consulted, appreciate recommendations  - Plan to place tunneled dialysis line today  - Monitor creatinine     # Metabolic acidosis, non-anion gap, improving: Likely secondary to hyperchloridemia in setting of acute renal failure. Baseline bicarbonate low at 21, so on sodium bicarbonate tabs TID at home. On arrival, pH of 7.06 with bicarbonate of 10 per VBG. Has received 7 amps of bicarb thus with improvement in blood gases.   - Nephrology consulted, appreciate recommendations.   - VBG Q 6 with goal pH>7.2  - Will replace bicarbonate as needed, but avoid gtt given volume overload  - Continue home Sodium Bicarbonate tabs  - Likely to dialyze  today    #Hypervolemia: Clinically appears euvolemic, however, laboratory and radiologic evidence of volume overload with diffuse anasarca, pleural effusions, and lower extremity edema in addition to BNP of 46,000. Complicated by renal failure which may be secondary to cardiorenal process. No response to small lasix dose overnight. Will consider more aggressive diuresis (which may improve overall cardiac function) if not dialyzing today.   - If no dialysis, will give 4 mg IV bumex and monitor response.      # Hypernatremia, Hyperchloridemia: Chronic in setting of chronic diuresis, however, acutely worsened today in setting of worsening renal failure.   - All meds to be given in D5  - Likely to need dialysis today    #Intermittent Hematuria: Noted in nephrology progress note. UA today with many RBCS, unclear if secondary to possible UTI in setting of supratherapeutic INR or if structural abnormality.   - Will monitor and consider further imaging if needed.      #Hyperkalemia, resolved: Secondary to acidosis and renal failure. Improved with bicarbonate boluses. EKG sinus bradycardia without peaked T waves  - Monitor     # Fluids: None     ===HEME/ONC===  # Anemia of Chronic Disease, stable: Baseline hemoglobin of 7.5. On iron supplementation chronically. Hb on arrival of 7.8. Stable on today's check.  - Monitor hemoglobin  - Check iron studies  - Transfuse if <7     #Supratherapeutic INR, improving: Patient last reports warfarin taken 8/11 despite being discontinued in cardiology clinic 2 days prior. S/p vitamin K. Will consider FFP prior to procedure if needed.   - Trend INR     ===ENDOCRINE===  # Type II DM, last A1c 7.3. Not currently on any medications.   - Monitor POCT glucoses and consider sub q insulin     ===INFECTIOUS DISEASE===  # Lower extremity Cellulitis: Patient with what appears to be arterial insufficiency ulcerations with superinfection. Initial concern for deep tissue infection given tense nature of  extremities with new ulcerations, however CT demonstrating no facial plane air and bilateral edema Wound cultures with few gram positives, but moderate gram negative rods.    - Surgery consulted, appreciate assistance with wound management  - Blood cultures NGTD, continue to follow  - Wound culture pending, continue to follow  - Abx as below  -Trend inflammatory markers     #  Incidental gluteal cyst vs abscess: Per CT, but not evident on examination.   - Monitor for now  - Surgery to evaluate in am whether patient needs I & D     #Possible UTI:  Urinalysis with many white cells, leukocyte esterase, and blood. Unfortunately,sample taken from bed pan, so likely dirty and unable to get adequate culture. No signs of pyelonephritis per CT, but non-contrast.   - Repeat UA when urine output improves  - ABX as below     # Enteritis?: Patient with ongoing diarrhea per previous outpatient provider notes. Complains of mild lower quadrant abdominal pain, but currently improved. CT with mesenteric haziness concerning for enteritis.   - Monitor  - Abx as below  - Consider stool culture if diarrhea continued     # Antimicrobials:  - Vancomycin (8/12-8/13)  - Ceftriaxone (8/12-8/13)  - Unasyn (8/13- )     ===SKIN/MSK===  # Bilateral Ulcerations, appear arterial  - Surgery recommends arterial dressings  - Antibiotics as above.      LINES: PIV x 2     Prophylaxis:  DVT: Supratherapeutic INR GI: None  Family:  Updated  Disposition: Stable, if no acute indication for dialysis, will consider transfer to floor  Code Status: Full     Patient was seen and discussed with attending physician Dr. Chung, who agrees with above assessment and plan.     Gilda Muro MD  Internal Medicine- Pediatrics PGY3  [phone]    Interval History  No acute events overnight. VSS aside from mild hypotension, that improved with transition of BP cuff to other arm. Denies abdominal pain, but does have lower extremity pain. Feeling hungry and would like to  eat.     Review of Systems:   A 4 point review of systems was negative except as noted above.    OBJECTIVE:  VS: BP 94/57 (BP Location: Left arm)  Temp 97.6  F (36.4  C) (Axillary)  Resp 14  Wt 83.3 kg (183 lb 11.2 oz)  SpO2 95%  BMI 31.53 kg/m2     I/O last 3 completed shifts:  In: 880 [I.V.:880]  Out: 90 [Urine:90]    PHYSICAL EXAMINATION  General: Alert, interactive, NAD. Intermittently confused, but re-directable  Skin: warm and dry, no jaundice or rashes. Occasional ecchymoses. Lower extremities covered with dressings.   HEENT: Atraumatic, MMM, PERRLA, EOMI, neck supple  CV: Bradycardic, no murmur, rubs, or gallops  Resp: Clear to auscultation bilaterally, no wheezes, crackles  Abd: Soft, non-distended, BS+, no masses appreciated. No tenderness to palpation of abdomen.   Extremities: Feet cool to touch. Pulses diminished but palpable. Difficult to appreciate edema given tightness of skin on lower extremities extremities.   Neuro: Alert and oriented, no lateralizing symptoms or focal neurologic deficits  Psych: Intermittent visual hallucinations, improved.    Labs:   All labs reviewed by me  Electrolytes/Renal -   Recent Labs   Lab Test  08/12/17   2210  08/12/17   1616  08/12/17   1013  07/17/17   1054   07/11/17   0642   07/06/17   1601   NA  147*  149*  146*  146*   < >  147*   < >  147*   POTASSIUM  5.0  5.2  5.6*  4.7   < >  3.2*   < >  5.6*   CHLORIDE  121*  125*  124*  117*   < >  112*   < >  123*   CO2  18*  14*  13*  21   < >  25   < >  13*   BUN  43*  41*  42*  37*   < >  57*   < >  84*   CR  5.13*  5.07*  5.18*  3.06*   < >  3.90*   < >  4.36*   GLC  103*  125*  136*  133*   < >  132*   < >  149*   MALICK  6.2*  6.4*  6.4*  6.9*   < >  6.0*   < >  6.9*   MAG   --   2.0   --    --    --   1.5*   --   2.3   PHOS   --   6.6*   --   3.2   --   4.0   --   7.1*    < > = values in this interval not displayed.     CBC -   Recent Labs   Lab Test  08/12/17   1616  08/12/17   1013  07/17/17   1054  07/10/17    0701   WBC  7.3  9.4   --   6.7   HGB  7.8*  8.2*  7.5*  8.2*   PLT  248  286   --   203     LFTs   Recent Labs   Lab Test  08/12/17   1616  08/12/17   1013  07/17/17   1054   07/07/17   0616   ALKPHOS  216*  228*   --    --   197*   BILITOTAL  0.2  0.2   --    --   0.7   ALT  44  49   --    --   35   AST  32  45   --    --   18   PROTTOTAL  4.7*  5.0*   --    --   5.5*   ALBUMIN  1.9*  2.1*  2.5*   < >  2.7*    < > = values in this interval not displayed.     Iron Panel -   Recent Labs   Lab Test  07/17/17   1054  06/21/17   0842  06/14/17   1750   IRON  23*  36  26*   IRONSAT  16  17  11*   GWEN  206  196  87     Imaging:  CT C/A/P  1. Findings of volume overload, including small bilateral pleural  effusions, diffuse haziness of the mesentery, and diffuse anasarca.  Enteritis is also a diagnostic consideration given the mesentery  findings.  2. No bowel obstruction or features to suggest bowel ischemia on this  noncontrast examination. No pneumatosis or portal venous gas.  3. Mild urinary bladder wall thickening with subtle surrounding  inflammation, correlation with UA could be considered.  4. 5.4 x 6.1 x 9.7 cm simple attenuation fluid collection within the  subcutaneous tissues of the lateral left buttock. Superimposed  infection cannot be excluded.  5. Cholelithiasis.    Current Medications:    cefTRIAXone  2 g Intravenous Q24H     vancomycin place maki - receiving intermittent dosing  1 each Does not apply See Admin Instructions     sodium bicarbonate  50 mEq Intravenous Once     gabapentin  300 mg Oral At Bedtime     sodium bicarbonate  650 mg Oral TID        Gilda Muro MD

## 2017-08-13 NOTE — PLAN OF CARE
Problem: Renal Failure/Kidney Injury, Acute (Adult)  Goal: Signs and Symptoms of Listed Potential Problems Will be Absent or Manageable (Renal Failure/Kidney Injury, Acute)  Signs and symptoms of listed potential problems will be absent or manageable by discharge/transition of care (reference Renal Failure/Kidney Injury, Acute (Adult) CPG).   Outcome: Declining  D: Pt arrived from [facility] with acute on chronic renal failure, acidosis and hyperkalemia.   Multiple ulcerations and some cellulitis in bilateral legs to knees.  -120's, SB in 50's.  Multiple electrolyte derangements. Afebrile, lactate 0.9, WBC normal. INR >5. pH7.06  I/A: Bicarb x 2 amps.  Abx started.  CT scan of bilateral legs. Renal consult, surgery consult. Vit K.  90 cc UOP all day.   P: Await CT results, continue to monitor lytes, pH, VS. Anticipate dialysis. MICU POC.

## 2017-08-13 NOTE — PROGRESS NOTES
Nephrology Progress Note  08/13/2017         Assessment & Recommendations:   Kathryn Banks is a 77 year old year old female  With hx of CKD stage 4 , due to chronic diabetes and CAD, presented with painful leg ulcer and cellulitis, her labs shows MARTÍN, her cr from her baseline and decrease urine output     MARTÍN on CKD: cr baseline is 3-4, now 5, no uremic symptoms, no indication for acute HD today, cont bicarb to correct her nongap metabolic acidosis to keep PH >7.2. Monitor I&O, monitor UOP. She might need HD in the future ok to start an access tunneled would be ideal since this is going to be long term.  8/13 no HD indicated today, again no uremic symptoms. will reevaluate tomorrow     HTN and volume stable but try to avoid hypotension, keep map BP >60, can use small fluid boluses 250 cc LR or NS as needed.      Metabolic acidosis non gap due to CKD, cont bicarb to keep PH >7.2 , consider bicarb 650mg po TID.      Electrolyte hyperkalemia resolved now is 5.1, Ca 6.4, albumin is 1.9. it would be 8.4 after correction with albumin, phos is 6.4. Can start phosLo,       Anemia of chronic disease,  iron store is low , replete iron        Recommendations were communicated to primary team via phone.     seen and discussed  with Dr. Cervantes.  Warren Savage  Nephrology Fellow  Pager: [phone]      Interval History :   In the last 24 hours Kathryn Banks no acute issue overnight few episode of low blood pressure, still oliguric , but no uremic symptoms, she denies any nausea or vomiting no itching no confusion, no asterixis.  Review of Systems:   (4 pt ROS reviewed alone is not adequate unless you detail systems you reviewed)  I reviewed the following systems:  GI: ok appetite. no nausea or vomiting or diarrhea.   Neuro:  no confusion, mild memory issue   Constitutional:  No fever or chills  CV: no dyspnea or edema.  no chest pain.    Physical Exam:   I/O last 3 completed shifts:  In: 1440 [I.V.:1440]  Out: 215  [Urine:215]   BP 98/50 (BP Location: Left arm)  Temp 98  F (36.7  C) (Oral)  Resp 15  Wt 83.3 kg (183 lb 11.2 oz)  SpO2 96%  BMI 31.53 kg/m2     GENERAL APPEARANCE: awake and alert not in distress   EYES:  no scleral icterus, pupils equal  HENT: mouth without ulcers or lesions  PULM: lungs clear to auscultation, bilaterally, equal air movement, no clubbing  CV: regular rhythm, normal rate, no rub     -JVP      -edema    GI: soft, not tender, not distended, bowel sounds are +ve  INTEGUMENT: no cyanosis, no rash, reagan leg ulcers painful hot tender erythematous in both legs   NEURO:  no asterixis       Labs:   All labs reviewed by me  Electrolytes/Renal -   Recent Labs   Lab Test  08/13/17   0735  08/13/17   0552  08/12/17   2210  08/12/17   1616   NA   --   148*  147*  149*   POTASSIUM   --   5.1  5.0  5.2   CHLORIDE   --   121*  121*  125*   CO2   --   18*  18*  14*   BUN   --   44*  43*  41*   CR   --   5.29*  5.13*  5.07*   GLC   --   120*  103*  125*   MALICK   --   6.4*  6.2*  6.4*   MAG  1.9  1.8  1.8   --   2.0   PHOS  6.4*  6.4*  6.4*   --   6.6*       CBC -   Recent Labs   Lab Test  08/13/17   0552  08/12/17   1616  08/12/17   1013   WBC  7.0  7.3  9.4   HGB  7.3*  7.8*  8.2*   PLT  216  248  286       LFTs -   Recent Labs   Lab Test  08/12/17   1616  08/12/17   1013  07/17/17   1054   07/07/17   0616   ALKPHOS  216*  228*   --    --   197*   BILITOTAL  0.2  0.2   --    --   0.7   ALT  44  49   --    --   35   AST  32  45   --    --   18   PROTTOTAL  4.7*  5.0*   --    --   5.5*   ALBUMIN  1.9*  2.1*  2.5*   < >  2.7*    < > = values in this interval not displayed.       Iron Panel -   Recent Labs   Lab Test  08/13/17   0552  07/17/17   1054  06/21/17   0842   IRON  20*  23*  36   IRONSAT  17 16 17   GWEN  188  206  196         Imaging:      Current Medications:    ampicillin-sulbactam (UNASYN) IV  3 g Intravenous Q24H     ferrous sulfate  325 mg Oral Daily     gabapentin  300 mg Oral At Bedtime      sodium bicarbonate  650 mg Oral TID        Warren Savage MD

## 2017-08-13 NOTE — PROGRESS NOTES
[facility]    { :[id number]} Progress Note    Date of Service (when Attending saw the patient): 08/13/2017    Interval History   Saw patient 1:30PM.  She is feeling good, no complaints laying in bed.  Legs are bothering her a bit but doesn't seem to be in a lot of pain.  Currently waiting to get    Assessment & Plan   Medical Student Note Resident Note   Assessment and Plan (Student)    Assessment:  Ms. Michael is a 76 y/o woman w/ a PMH of CKD4, A-Fib s/p ablation, CAD s/p CABG x5, HTN, and T2DM who presents from [facility] w/ MARTÍN on CKD, metabolic acidosis in the setting of suspected cellulitis and UTI/pyelonephritis.      Plan:  #MARTÍN on CKDV: Patient followed as outpatient by Dr. Barfield. CKD thought to be multifactorial and secondary to DM, HTN, renovascular disease, and previous MARTÍN. No evidence of structural kidney disease by ultrasound. Most recent episodes secondary to volume overload and aggressive diureses. Baseline Cr of 2.3-2.5, on admission today, found to be 5.18.  Now at 5.29.  Unclear etiology of current decompensation, possibly secondary to HF exacerbation vs infection.   - Nephrology consulted, appreciate recommendations  - Plan to place tunneled dialysis line today  - Monitor creatinine    #Possible Cellulitis:  WBCs decreased to 7 today from 9.4 y/d.  CRP now 30 from 14 y/d. ESR elevated at 92. Legs/feet are wrapped limiting physical exam.  Toes/thighs are warm with good capillary refill suggesting arterial insufficiency unlikely.  Presented to the ED for increasing bilateral leg pain, but worse in right leg.  Has had leg pain [id number].  Has had bilateral ulcers on the dorsum of her lower legs for weeks that have been getting worse.  Prior history of cellulitis as well.  Previously found to be MRSA negative.  Ct negative for deep tissue infection.  Wound cultures growing non-lactose fermenting gram negatives (i.e, possible pseudomonas).    -vascular  surgery consult for tomorrow, Monday  -vascular dressings for wounds  -elevate legs as much as possible  -Blood cultures NGTD  -Would cultures pending  -Wound care consulted  -stop ampicillin-sulbactam    -start zosyn (pip/tazo)   -Trend inflammatory/infection markers    #Possible UTI:  Patient reports dysuria, increased frequency for past 2 weeks.  U/A today showed moderate blood, 100mg/dl albumin, large leukocyte esterase w/ 4649 wbc/hpf and few bacteria.  Enterococcus grown in prior urine cultures.   -Urine culture pending  -start zosyn  -start vanc to cover possible enterococcal infection.    # Metabolic acidosis, non-anion gap, improving: Likely secondary to hyperchloridemia in setting of acute renal failure. Baseline bicarbonate low at 21, so on sodium bicarbonate tabs TID at home.  On arrival, pH of 7.06 per VBG and CO2 of 13 per BMP.  Now at pH 7.25 withCO2 of 18.  Has received 7 amps of bicarb thus with improvement in blood gases.   - Nephrology consulted, appreciate recommendations.   - VBG Q 6 with goal pH>7.2  - Will replace bicarbonate as needed, but avoid gtt given volume overload  - Continue home Sodium Bicarbonate tabs 650mg qtid  - Likely to dialyze today    #Electrolytes  Potassium was 5.6 on admission, now 5.1.  Mg2+ normal. Ionized calcium low at 4.1. Phosphorous elevated at 6.4.      -start phoslo (calcium acetate) TID 667mg w/ meals  -monitor BMP, iCa, Mg.   -start Calcium gluconate 1g in D5W 100ml      #BP/Volume:  Stable with /60, HR 64.  Hypotensive this AM and on admission y/d.  Hypotension possibly 2/2 third spacing from UTI/cellulitis sepsis.  BNP elevated suggesting volume overload 2/2 renal failure.    -Hold lasix in setting of low-normal blood pressure.    #HFpEF: Echo today showed normal LVEF 60-65% with moderately reduced RV function.  Elevated pulmonary artery pressure (in setting of volume overload).  We repeated echo in setting of volume overload to check heart function.  Hx  of HF exacerbations 2/2 non-compliance w/ home diuresis.  Not clinically hypervolemic on examination, however BNP 46k y/d and CT w/ diffuse anasarca possible 2/2 to renal osteodystrophy or GI malabsorption 2/2 diarrhea.  Hypotensive on presentation to ED, but now hemodynamically stable.   -Hold diuresis in setting of MARTÍN, and low blood pressure  -monitor daily weights  -monitor I/O    #Supratherapeutic INR, improving: Patient last reports warfarin taken 8/11 despite being d/c in cardiology clinic 2 days prior.  S/p vitamin K.  Consider FFP (INR 1.6-1.7) prior to procedure PRN.  FFP is high volume.  INR was 5.47 y/d; now 2.35.  IR will not do tunnel dialysis line if INR is elevated.  -Trend INR  -give vit. k    #Anemia 2/2 to Iron Deficiency & CKD  Hb is 7.3 down from 8.2 y/d.  Serum iron is low at 20.  MCV at 80.  Anemia could also be 2/2 to CKD, I.e., decreased kidney EPO production.  -start ferrous sulfate tab 325mg qd  -transfuse PRBC if <7; monitor for now    Cardiovascular:  #Hx of A-fib s/p ablation 6/2017: Follows w/ Dr. King of cardiology and seen last on 8/10.  In sinus rhythm, so warfarin d/c and metoprolol recently decreased 50mg->25mg  -monitor vitals  -hold metoprolol in setting of hypotension & bradycardia    #CAD s/p CABGx5  Resume asparin today after IR CVC Tunnel placement     Pulmonary:  #Bilateral Small Pulmonary Effusions in the setting of HF & Volume overload: Blunted left costophrenic angle on XR and bilateral pulmonary effusions on CT.  Currently stable, no increased work of breathing room air.  -supplemental O2; maintain >92%  -Diurese PRN    T2DM, last A1C 7.3:  Not currently on any meds.  Blood sugars <150  -Monitor POCT glucoses    #  Incidental gluteal cyst vs abscess: Per CT, but not evident on examination.   - Monitor for now  - Surgery doesn't recommend I & D    # Enteritis: Patient with ongoing diarrhea per previous outpatient provider notes. Complains of mild lower quadrant abdominal  "pain, but currently improved. CT with mesenteric haziness concerning for enteritis.  Diarrhea once/day for past two months possibly started after UTI treatment 6-8 weeks ago.  Blood in stool 2x/month.    - Monitor  - Abx as below  - Consider stool culture if diarrhea continued    #Nutrition  Renal diet    Pain:  -Tylenol PRN  -Cont. Home tramadol; renal dose given visual hallucinations w/ other opioids/inability to take NSAID's  -Continue gabapentin       Disposition Plan: 4-5 days pending clinical improvement, tolerating dialysis, improving labs and evaluation/treatment of lower leg pain and UTI/pyelo.   Assessment and Plan (Resident)    Assessment:  Kathryn Banks is a 77 year old female who was admitted on 8/12/2017. ***    Plan:  {Write a problem-based plan:***}    Disposition Plan: Expected discharge in *** days, once ***.      Physical Exam (Student)  /60  Temp 98  F (36.7  C) (Oral)  Resp 8  Wt 83.3 kg (183 lb 11.2 oz)  SpO2 94%  BMI 31.53 kg/m2    Constitutional: Appears well/peaceful, AOx3, lying in bed, not in distress  HEENT: EOMI  Respiratory: Good airflow, crackles lower right lobe  Cardiovascular: normal S1/S2, RRR, asymptomatic systolic ejection murmer grade 2 w/ possible radiation to carotids  GI: ***  Musculoskeletal: ***  Skin/Integumen: Lower legs/feet are wrapped; hard to evaluate.  Toes/thighs are warm to touch.  Neuro: ***    Data Interpretation  I have reviewed today's vital signs, medications, labs and imaging which are significant for MARTÍN on CKD, metabolic acidosis responsive to bicard and NS, in the setting of suspected cellulitis and possible UTI/pyelonephritis.    Physical Exam (Resident)  {Choose blank or pick list and edit:[id number]}    Data Interpretation  I have reviewed today's vital signs, medications, labs and imaging which are significant for: ***   I personally reviewed {Choose images/EKG's you personally looked at today:[id number]::\"no images or EKG's today\"}.    {MEDICAL " STUDENT SIGNATURE: [place], MS3 {RESIDENT SIGNATURE:299094}     Medications        ampicillin-sulbactam (UNASYN) IV  3 g Intravenous Q24H     ferrous sulfate  325 mg Oral Daily     gabapentin  300 mg Oral At Bedtime     sodium bicarbonate  650 mg Oral TID       Data     Recent Labs  Lab 08/13/17  0552 08/13/17  0210 08/12/17  2210 08/12/17  1616 08/12/17  1013  08/09/17   WBC 7.0  --   --  7.3 9.4  --   --    HGB 7.3*  --   --  7.8* 8.2*  --   --    MCV 80  --   --  80 83  --   --      --   --  248 286  --   --    INR  --  2.35*  --  5.47*  --   --  3.8   *  --  147* 149* 146*  < >  --    POTASSIUM 5.1  --  5.0 5.2 5.6*  < >  --    CHLORIDE 121*  --  121* 125* 124*  < >  --    CO2 18*  --  18* 14* 13*  < >  --    BUN 44*  --  43* 41* 42*  < >  --    CR 5.29*  --  5.13* 5.07* 5.18*  < >  --    ANIONGAP 9  --  9 10 9  < >  --    MALICK 6.4*  --  6.2* 6.4* 6.4*  < >  --    *  --  103* 125* 136*  < >  --    ALBUMIN  --   --   --  1.9* 2.1*  --   --    PROTTOTAL  --   --   --  4.7* 5.0*  --   --    BILITOTAL  --   --   --  0.2 0.2  --   --    ALKPHOS  --   --   --  216* 228*  --   --    ALT  --   --   --  44 49  --   --    AST  --   --   --  32 45  --   --    < > = values in this interval not displayed.    Recent Results (from the past 24 hour(s))   CT Ankle Left w/o Contrast    Narrative    Exam: Bilateral ankle CT without contrast  8/12/2017 7:13 PM      History: Chronic kidney disease, concern for fasciitis versus  compartment syndrome    Comparison: None    Findings: Diffuse edema throughout the soft tissues of the bilateral  ankle and feet. Bones appear diffusely osteopenic. Vascular  calcifications. No acute fracture or erosive changes. Normal bony  alignment. No gas within the visualized heterogeneous tissues. Right  os trigonum. Degenerative changes, most severe between the right  navicular and right cuneiforms. Calcaneal enthesophytes.      Impression    Impression: Nonspecific diffuse  edema throughout both ankles and feet  were visualized. No gas within the soft tissues to specifically  suggest necrotizing fasciitis.    I have personally reviewed the examination and initial interpretation  and I agree with the findings.    MISTY TERRY MD   CT Ankle Right w/o Contrast    Narrative    Exam: Bilateral ankle CT without contrast  8/12/2017 7:13 PM      History: Chronic kidney disease, concern for fasciitis versus  compartment syndrome    Comparison: None    Findings: Diffuse edema throughout the soft tissues of the bilateral  ankle and feet. Bones appear diffusely osteopenic. Vascular  calcifications. No acute fracture or erosive changes. Normal bony  alignment. No gas within the visualized heterogeneous tissues. Right  os trigonum. Degenerative changes, most severe between the right  navicular and right cuneiforms. Calcaneal enthesophytes.      Impression    Impression: Nonspecific diffuse edema throughout both ankles and feet  were visualized. No gas within the soft tissues to specifically  suggest necrotizing fasciitis.    I have personally reviewed the examination and initial interpretation  and I agree with the findings.    MISTY TERRY MD   CT Abdomen Pelvis w/o Contrast    Narrative    Exam: CT abdomen pelvis without contrast 8/12/2017 7:22 PM.    History: Acidosis, abdominal pain, diarrhea.    Comparison: CT 6/7/2012.    Technique: CT images from the lung bases through the symphysis pubis  without intravenous or oral contrast.    FINDINGS: Small bilateral pleural effusions, left larger than right.  Mild bibasilar atelectasis. Heart size is normal. No pericardial  effusion. Atherosclerotic calcifications of the coronary vasculature.  Relative hypoattenuation of blood in relation to the myocardium  suggestive of anemia.     Postoperative changes of gastric bypass. Noncontrast evaluation of the  liver, spleen, and adrenals is unremarkable. Cholelithiasis with  moderate gallbladder  distention. No pericholecystic fluid or  gallbladder wall thickening. Moderate atrophic changes of the  pancreas. Left renal cyst. No hydronephrosis or hydroureter. Mild  urinary bladder wall thickening with a trace amount of fluid along the  right/anterior border of the bladder (series 3, image 339). Prior  hysterectomy. Trace free fluid within the pelvis. Multiple colonic  diverticula. Prior appendectomy. Colon and small bowel are  nondistended. There is no free air, pneumatosis or portal venous gas.  Vascular calcifications. Diffuse mesenteric haziness and mildly  engorged mesenteric vasculature. Scattered mildly prominent  retroperitoneal and mesenteric lymph nodes. Moderate diffuse anasarca.  Large fluid collection within the subcutaneous tissues of the lateral  left buttock measuring 5.4 x 6.1 x 9.7 cm.    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 Multilevel  spondylosis of the lumbar spine. No suspicious lytic or sclerotic bony  lesions. Old left-sided rib fractures.      Impression    IMPRESSION:   1. Findings of volume overload, including small bilateral pleural  effusions, diffuse haziness of the mesentery, and diffuse anasarca.  Enteritis is also a diagnostic consideration given the mesentery  findings.  2. No bowel obstruction or features to suggest bowel ischemia on this  noncontrast examination. No pneumatosis or portal venous gas.  3. Mild urinary bladder wall thickening with subtle surrounding  inflammation, correlation with UA could be considered.  4. 5.4 x 6.1 x 9.7 cm simple attenuation fluid collection within the  subcutaneous tissues of the lateral left buttock. Superimposed  infection cannot be excluded.  5. Cholelithiasis.    I have personally reviewed the examination and initial interpretation  and I agree with the findings.    MISTY TERRY MD

## 2017-08-13 NOTE — PLAN OF CARE
Problem: Goal Outcome Summary  Goal: Goal Outcome Summary  Outcome: Declining  No major event during shift. Pt's BP became soft. MD notified. Gave boluses of LR, BP remains soft, Pt remains afebrile. HR remains sinus to bradycardic. Pt is O2 sating well on room air.  Pt is A&O x4 with intermittent confusion. Remains cooperative. Pt remains acidotic, received 1 amp and PO bicarb. Pt remains hypernatremic. Pt had 125 of urine during shift. Pt has pain in legs. MD wrapped and cleaned wounds.      P: Put in dialysis cath, HD when able. Increase BP. Continue to monitor and correct labs. Continue to monitor and notify provider as needed.

## 2017-08-13 NOTE — PROGRESS NOTES
[facility] - Internal Medicine Resident Progress Note  Date of Service: 08/13/2017    Patient: Kathryn Banks  MRN: [id number]  Admission Date: 8/12/2017  Hospital Day # 1    Assessment & Plan:  Kathryn Banks is a 77 year old female with a history of CAD s/p CABD x5, HFpEF (60-65%) CKD 4, anemia, Type 2 DM, AFib s/p ablation 6/2017, presents with acute on chronic kidney disease and metabolic acidosis in the setting of possible cellulitis vs UTI as source, now improved on day 2 of antibiotics.     Metabolic acidosis- On admission pH was 7.06. Initially there was concern that Kathryn would need dialysis, however with treatment of the infection with antibiotics, fluids (received about 1.75L of fluids from the time of transfer to [place], to transfer from ICU to floor) as well as bicarb (total of 7 amps) and today's pH was 7.25 from VBG. Baseline bicarb at home is 21- currently 18.   - Nephrology following, appreciate recs.   - No need for dialysis at this time due to improvement in acidosis and clinical stability.   - Continue home bicarb TID  - Continue to monitor, q8 VBGs.     MARTÍN on CKD stage 4- Etiology of MARTÍN is most likely secondary to sepsis causing third spacing from the UTI vs the lower extremity wounds, versus cardiorenal picture of diffuse anasarca as evidenced on CT causing venous congestion and subsequently decreasing perfusion through the kidneys. Clinically it is more likely to be sepsis with not enough intravascular volume. Urine output improving today- was anuric yesterday (UOP 90cc) however significantly improved during the day today- UOP 400cc. Tentative plan to place tunneled IJ cathether.   - Does not need dialysis now, will hold off on placing line, however reassess tomorrow.   - Nephrology following, appreciate recs.   - Treat infection with antibiotics  - Continue to monitor BMP  - I/O    UTI- has been having symptoms of burning and frequency for the past couple of weeks. She has  had decreased urinary output in the past two days, however improved today. UA was turbid, and UCx is pending.   - f/u UCx   - Continue Unasyn 3g daily.   - Kidney ultrasound today.     Lower extremity ulcers- etiology unclear, possibly due to arterial insuffieciency causing ulcers with super-infection. CT was negative for presence of deep tissue infection. Surgery was consulted for possible need for debridement, and although there is no need for debridement, recommendations on dressing changes were given.   - Wound care consult  - Continue Unasyn 3g daily.   - Consult vascular surgery tomorrow.     HFpEF EF 60-65%- Last echo was 5/31 with EF 60-65%, concentric LV hypertrophy, biatrial enlargement, and elevated RV pressures. Has had a history of non-compliance with home diuresis.  BNP was in the 40k, however with her renal failure and oliguria there is extra fluid on board which would cause an increase in her BNP. Her CT scan showed diffuse anasarca, showing that she will need diuresis eventually, however currently she needs more intravascular volume to manage effects from systemic infection.   - When clinically more stable from BP standpoint, will start to diurese, consider that she may need very high doses of lasix to overcome poor kidney function.   - Daily weights  - I/O    Hx of AFib, s/p ablation 6/2017-Follows w/ Dr. King of cardiology, last seen on 8/10, at which time warfarin was d/c'ed and metoprolol was decreased. In sinus rhythm.   - Continue telemetry  - hold home metoprolol due to hypotension.     Small bilateral pleural effusions in s/o HF-   No problems breathing, however blunting of costophrenic ancle on CXR and small bilateral pulm effusions on CT.   - Maintain O2 sats above 92%  - Plan to diurese in the near future, when her blood pressure and systemic inflammatory response resolves.     CAD s/p CABD x5  - Holding aspirin for now, pending tunneled line.     Abdominal pain and diarrhea- per patient is  chronic, with some blood from hemmheroids. CT shows mesenteric haziness- edema vs enteritis- however WBC and lactate are wnl, so unlikely due to ischemic or infectious process. Although there is a decrease in hgb, this is due to a dilutional effect, as evidenced with the rest of the CBC.   - Montior and treat symptoms as needed    Chronic pain- some is caused by diabetic neuropathy, per patient. Wound management for pain from ulcers.  - continue home gabapentin.   - Tylenol PRN.     CODE: FULL  DVT: SCD  FEN: Renal diet  Disposition: Pending medical optimization with regard to acidemia, MARTÍN, and infection.     Patient staffed with Dr. Epperson who agrees with above plan    Andria Montejo MD  PGY1  Pager: [phone]    ___________________________________________________________________    Subjective & Interval Hx:    Patient is comfortable, chatting and joking in bed. Continued pain in her lower extremities, which she states is worse than on admission. Burning and urgency with urination has been going on for the past couple of weeks, with some back pain.     Last 24 hr care team notes reviewed.   ROS:  4 point ROS including Respiratory, CV, GI and , other than that noted in the HPI, is negative    Medications:  Reviewed in EPIC. List below for reference    Physical Exam:  Blood pressure 98/50, temperature 98  F (36.7  C), temperature source Oral, resp. rate 15, weight 83.3 kg (183 lb 11.2 oz), SpO2 96 %.    I/O last 3 completed shifts:  In: 1440 [I.V.:1440]  Out: 215 [Urine:215]    General: alert and no distress, conversant and pleasant.   Head: NC/AT  Eyes: non-icteric sclera, EOMI  Pulmonary: Fine crackles at base of right lung, otherwise good air movement and clear to auscultation.   CV: RRR, systolic ejection murmur 2/6. RRR.   GI: soft, non-tender, non-distended + bowel sounds  Skin: no rashes seen on exposed skin. Her shins and legs were covered in dressings.   MSK: no deformity.   EXT: Some edema of lower  extremities. Toes are warm and well perfused with cap refill <2sec.   Neuro: A&Ox3, grossly intact, no focal deficits    Labs & Studies of Note:   Labs and studies reviewed in EPIC    Unresulted Labs Ordered in the Past 30 Days of this Admission     Date and Time Order Name Status Description    8/13/2017 1141 Urine Culture Aerobic Bacterial Preliminary     8/12/2017 2119 Blood culture Preliminary     8/12/2017 2119 Blood culture Preliminary     8/12/2017 1805 Wound Culture Aerobic Bacterial Preliminary     8/12/2017 1800 Urine Culture Aerobic Bacterial Preliminary     8/12/2017 1645 Aldolase In process           Medications list for Reference:   Current Facility-Administered Medications   Medication     ampicillin-sulbactam (UNASYN) 3 g vial to attach to  mL bag     ferrous sulfate (IRON) tablet 325 mg     naloxone (NARCAN) injection 0.1-0.4 mg     acetaminophen (TYLENOL) tablet 650 mg    Or     acetaminophen (TYLENOL) Suppository 650 mg     gabapentin (NEURONTIN) capsule 300 mg     traMADol (ULTRAM) half-tab 25 mg     sodium bicarbonate tablet 650 mg

## 2017-08-13 NOTE — CONSULTS
Nephrology Initial Consult  August 12, 2017      Kathryn Banks MRN:[id number] YOB: 1940  Date of Admission:8/12/2017  Primary care provider: Dheeraj Jj  Requesting physician: Ricco Chung MD    ASSESSMENT AND RECOMMENDATIONS:   MARTÍN on CKD cr baseline is 3-4, now 5, no uremic symptoms, no indication for acute HD today, cont bicarb to correct her nongap metabolic acidosis to keep PH >7.2. Monitor I&O, monitor UOP. She might need HD in the future ok to start an access tunneled would be ideal since this is going to be long term.      HTN and volume stable avoid hypotension, use Lasix to manage volume overload, if not responsive to Lasix might need HD. Signs of volume overload BNP is elevated could be due to her renal failure but consider repeating Echo.     Metabolic acidosis non gap due to CKD, cont bicarb to keep PH >7.2    Electrolyte K 5.6 hyperkalemia cont bicarb, trial of Lasix, Ca 6.4, albumin is 1.9. it would be 8.4 after correction with albumin     Anemia of chronic disease, check iron store, transfuse PRBC if <7, monitor for now.     Recommendations were communicated to primary team via phone and in person    Patient was seen and discussed with Dr. Cervantes.  Warren Savage  Nephrology Fellow  Pager: [phone]      REASON FOR CONSULT: metabolic acidosis, CKD and MARTÍN    HISTORY OF PRESENT ILLNESS:  Kathryn Banks is a 77-year-old female who presented to the ER for lower extremities pain and open sores for evaluations. She had legs ulcer for a long time but now it becomes very painful and also more leg swelling and erythematous.  She was known to have CKD stage 4 and she follow up with Dr Barfield last time was seen in July after hospital admission and MARTÍN. Her CKD is related to her chronic comorbidity and her Type 2 DM, she also has CAD with CABG x 5 , chronic atrial fibrillation, HTN, restless leg syndrome, pulmonary HTN, diastolic heart failure, anemia, her serum creatinine  was running between 3 and 4 since July 2017. She has had multiple MARTÍN episodes in the past with CKD since ~2013.  Today in the ER her Cr was up to 5.1, she wasn't urinating since am and her bicarb is 13, nongap metabolic acidosis, she received multiple doses of bicarbs, her VBG improved from 7.0 to 7.22.  At this time pt is awake not in any distress, no uremic symptoms, she denies any nausea or vomiting, still complaining of leg pain, no c/p or sob, no fever no chills or sweating, no diarrhea.                             PAST MEDICAL HISTORY:  Reviewed with patient on 08/12/2017     Past Medical History:   Diagnosis Date     Carpal tunnel syndrome      Coronary atherosclerosis of native coronary artery 2006    5 vessel CABG     OBSTRUCTIVE SLEEP APNEA     using CPAP     Osteoarthrosis, unspecified whether generalized or localized, unspecified site     generalized arthritis, particularly in her hands and feet         Other and combined forms of senile cataract 2000    Bilateral     Other and unspecified hyperlipidemia      RESTLESS LEGS SYNDROME      TENOSYNOV HAND/WRIST NEC 6/30/2006     Type II or unspecified type diabetes mellitus without mention of complication, not stated as uncontrolled 2001    Diabetes mellitus/pt is diet controlled with weight loss     Typical atrial flutter (H) 01/17/2007    ablation 6/7/2017     Unspecified essential hypertension        Past Surgical History:   Procedure Laterality Date     ANGIOPLASTY       C APPENDECTOMY       C CABG, VEIN, FIVE  12/06    SVG x 4 and LIMA to LAD     C SOTO W/O FACETEC FORAMOT/[facility] 1/2 VRT SEG, LUMBAR  1968     C REMV CATARACT INTRACAP,INSERT LENS      Bilateral     C TOTAL ABDOM HYSTERECTOMY  1995     - fibroids     C VAGOTOMY/PYLOROPLASTY,SELECT  1970     COLONOSCOPY  12/3/2007     COLONOSCOPY  1/17/2014    Procedure: COLONOSCOPY;  Colonoscopy;  Surgeon: Zack Stearns MD;  Location:  GI     CYSTOSCOPY  11/25/09    [facility]     ENDOSCOPY  03/21/2000     Upper GI     HC COLONOSCOPY THRU STOMA, DIAGNOSTIC  10/7/04    poor prep, repeat in 2-3 years     HC COLONOSCOPY W/WO BRUSH/WASH  10/31/07    Repeat-poor quality prep     HC REMOVAL OF OVARY/TUBE(S)      Salpingo-Oophorectomy, Unilateral     HC REVISE MEDIAN N/CARPAL TUNNEL SURG      Carpal tunnel release     HC UGI ENDOSCOPY DIAG W BIOPSY  10/31/07     HC UGI ENDOSCOPY, SIMPLE EXAM  12/3/2007     OPEN REDUCTION INTERNAL FIXATION HIP NAILING Left 7/3/2016    Procedure: OPEN REDUCTION INTERNAL FIXATION HIP NAILING;  Surgeon: Lake Schulz MD;  Location:  OR        MEDICATIONS:  PTA Meds  Prior to Admission medications    Medication Sig Last Dose Taking? Auth Provider   traMADol (ULTRAM) 50 MG tablet Take 1-2 tablets ( mg) by mouth every 6 hours as needed for pain maximum 3 tablet(s) per day   Dheeraj Jj MD   metoprolol (LOPRESSOR) 50 MG tablet Take 0.5 tablets (25 mg) by mouth 2 times daily   Thaddeus King MD   aspirin 81 MG tablet Take 1 tablet (81 mg) by mouth daily   Thaddeus King MD   amLODIPine (NORVASC) 5 MG tablet Take 2 tablets (10 mg) by mouth daily   Thaddeus King MD   sodium bicarbonate 650 MG tablet Take 1 tablet (650 mg) by mouth 3 times daily   Dheeraj Jj MD   calcium acetate (PHOSLO) 667 MG CAPS capsule Take 2 capsules (1,334 mg) by mouth 3 times daily (with meals)   Damian Contreras MD   furosemide (LASIX) 40 MG tablet Take 1 tablet (40 mg) by mouth daily   Damian Contreras MD   pravastatin (PRAVACHOL) 40 MG tablet Take 1 tablet (40 mg) by mouth daily   Dheeraj Jj MD   acetaminophen (TYLENOL) 325 MG tablet Take 2 tablets (650 mg) by mouth every 4 hours as needed for mild pain   Arash Silva MD   nitroglycerin (NITROSTAT) 0.4 MG sublingual tablet Place 1 tablet (0.4 mg) under the tongue every 5 minutes as needed for chest pain  Patient not taking: Reported on 8/10/2017   Arash Silva MD   gabapentin (NEURONTIN) 300 MG capsule Take 1 capsule (300 mg) by mouth At  Bedtime  Patient taking differently: Take 300 mg by mouth 2 times daily    Arash Silva MD   pramipexole (MIRAPEX) 0.5 MG tablet Take 3 tablets (1.5 mg) by mouth every evening   Arash Silva MD   ferrous sulfate (IRON) 325 (65 FE) MG tablet Take 1 tablet (325 mg) by mouth daily (with breakfast)   Arash Silva MD   calcium carbonate-vitamin D 500-400 MG-UNIT TABS per tablet Take 1 tablet by mouth daily   Arash Silva MD   cyanocobalamin (VITAMIN B12) 1000 MCG/ML injection Inject 1 mL (1,000 mcg) into the muscle every 30 days   Dheeraj Jj MD   ORDER FOR DME Equipment being ordered: cpap machine, mask, humidifier, tubing and filters   Dheeraj Jj MD      Current Meds    cefTRIAXone  2 g Intravenous Q24H     vancomycin place maki - receiving intermittent dosing  1 each Does not apply See Admin Instructions     sodium bicarbonate  50 mEq Intravenous Once     furosemide  20 mg Intravenous Once     gabapentin  300 mg Oral At Bedtime     Infusion Meds       ALLERGIES:    Allergies   Allergen Reactions     Ciprofloxacin Nausea and Vomiting     Zofran did not help     Oxycodone Visual Disturbance     Delusions, blackouts      Lisinopril Cough     Penicillins Rash     Sulfa Drugs GI Disturbance     LOSS OF TASTE       REVIEW OF SYSTEMS:  A 10 point review of systems was negative except as noted above.    SOCIAL HISTORY:   Social History     Social History     Marital status:      Spouse name: Urbano Banks     Number of children: 2     Years of education: 13     Occupational History     Ministry coordinator      [employer]     Social History Main Topics     Smoking status: Former Smoker     Years: 10.00     Quit date: 1/1/1969     Smokeless tobacco: Never Used     Alcohol use 0.0 oz/week     0 Standard drinks or equivalent per week      Comment: occasional- 2 drinks per month     Drug use: No     Sexual activity: Yes     Birth control/ protection: Surgical      Other Topics Concern     Parent/Sibling W/ Cabg, Mi Or Angioplasty Before 65f 55m? No     Social History Narrative     Reviewed with patient     FAMILY MEDICAL HISTORY:   Family History   Problem Relation Age of Onset     DIABETES Father      AODM     Neurologic Disorder Father      Parkinson's     Blood Disease Father       from blood clot from leg to lung immediately after hip fracture     DIABETES Paternal Grandmother      adult onset     DIABETES Maternal Grandmother      adult onset     Hypertension No family hx of      CEREBROVASCULAR DISEASE No family hx of      Reviewed with patient     PHYSICAL EXAM:   Temp  Av.5  F (36.4  C)  Min: 96.2  F (35.7  C)  Max: 98.2  F (36.8  C)      Pulse  Av.4  Min: 49  Max: 98 Resp  Av.5  Min: 8  Max: 18  SpO2  Av.3 %  Min: 90 %  Max: 100 %       /63  Temp 97.6  F (36.4  C) (Axillary)  Resp 14  Wt 83.3 kg (183 lb 11.2 oz)  SpO2 94%  BMI 31.53 kg/m2   Date 17 0700 - 17 0659   Shift 9022-8140 1631-9733 0553-7224 24 Hour Total   I  N  T  A  K  E   I.V.  800  800    Shift Total  (mL/kg)  800  (9.6)  800  (9.6)   O  U  T  P  U  T   Urine  90  90    Shift Total  (mL/kg)  90  (1.08)  90  (1.08)   Weight (kg)  83.32 83.32 83.32        Admit Weight: 83.3 kg (183 lb 11.2 oz)     GENERAL APPEARANCE: no distress,  awake  EYES: no scleral icterus, pupils equal  HENT: NC/AT,  mouth  without ulcers or lesions  Lymphatics: no cervical or supraclavicular LAD  Pulmonary: lungs clear to auscultation with equal breath sounds bilaterally, no clubbing  CV: regular rhythm, normal rate, no rub   - JVP no   - Edema 2+reagan   GI: soft, nontender, normal bowel sounds, no HSM   MS: no evidence of inflammation in joints, no muscle tenderness  : no Cee,   SKIN: no rash, warm, dry, no cyanosis  NEURO: mentation intact and speech normal    LABS:   CMP  Recent Labs  Lab 17  1616 17  1013   * 146*   POTASSIUM 5.2 5.6*   CHLORIDE 125* 124*    CO2 14* 13*   ANIONGAP 10 9   * 136*   BUN 41* 42*   CR 5.07* 5.18*   GFRESTIMATED 8* 8*   GFRESTBLACK 10* 10*   MALICK 6.4* 6.4*   MAG 2.0  --    PHOS 6.6*  --    PROTTOTAL 4.7* 5.0*   ALBUMIN 1.9* 2.1*   BILITOTAL 0.2 0.2   ALKPHOS 216* 228*   AST 32 45   ALT 44 49     CBC  Recent Labs  Lab 08/12/17  1616 08/12/17  1013   HGB 7.8* 8.2*   WBC 7.3 9.4   RBC 3.24* 3.38*   HCT 25.9* 27.9*   MCV 80 83   MCH 24.1* 24.3*   MCHC 30.1* 29.4*   RDW 21.6* 22.1*    286     INR  Recent Labs  Lab 08/12/17  1616 08/09/17 08/07/17   INR 5.47* 3.8 3.8     ABG  Recent Labs  Lab 08/12/17  1744 08/12/17  1616 08/12/17  1320 08/12/17  1013   PH 7.26*  --   --   --    PCO2 34*  --   --   --    PO2 92  --   --   --    HCO3 15*  --   --   --    O2PER 21.0 21.0 21 21      URINE STUDIES  Recent Labs   Lab Test  08/12/17   1800  07/07/17   1600  06/22/17   0119  06/21/17   0850   07/02/16   0224  02/05/13   1016  01/31/13   1210  01/22/13   0921   COLOR  Yellow  Yellow  Yellow  Light Yellow   < >  Yellow  Yellow  Yellow  Yellow   APPEARANCE  Cloudy  Slightly Cloudy  Slightly Cloudy  Slightly Cloudy   < >  Clear  Clear  Clear  Clear   URINEGLC  Negative  Negative  Negative  Negative   < >  Negative  >=1000*  >=1000*  500*   URINEBILI  Negative  Negative  Negative  Negative   < >  Negative  Negative  Negative  Negative   URINEKETONE  Negative  Negative  Negative  Negative   < >  Negative  Negative  Negative  Negative   SG  1.015  1.009  1.010  1.009   < >  1.020  1.020  1.025  1.020   UBLD  Large*  Small*  Trace*  Trace*   < >  Trace*  Negative  Negative  Small*   URINEPH  5.5  5.5  5.0  5.0   < >  6.0  6.0  6.0  6.0   PROTEIN  100*  30*  10*  10*   < >  Negative  Trace*  Trace*  30*   UROBILINOGEN   --    --    --    --    --   0.2  0.2  0.2  0.2   NITRITE  Negative  Negative  Negative  Negative   < >  Negative  Positive*  Positive*  Negative   LEUKEST  Large*  Moderate*  Large*  Large*   < >  Negative  Small*  Trace*  Moderate*    RBCU  >182*  11*  4*  2-5*   < >  2-5*  O - 2  2-5*  10-25*   WBCU  2568*  7*  136*  *   < >  O - 2  *  *  >100*    < > = values in this interval not displayed.     Recent Labs   Lab Test  07/17/17   1107  06/21/17   0848  06/14/17   1455  06/01/17   2045   [facility]PG  1.29*  0.89*  1.31*  2.11*     PTH  Recent Labs   Lab Test  07/17/17   1054  06/21/17   0842   PTHI  726*  567*     IRON STUDIES  Recent Labs   Lab Test  07/17/17   1054  06/21/17   0842  06/14/17   1750  08/20/12   1146  06/07/12   1350   IRON  23*  36  26*  32*  15*   FEB  145*  209*  229*  285  323   IRONSAT  16  17  11*  11*  5*   GWEN  206  196  87   --   7*           Warren Savage MD

## 2017-08-14 ENCOUNTER — APPOINTMENT (OUTPATIENT)
Dept: ULTRASOUND IMAGING | Facility: CLINIC | Age: 77
DRG: 291 | End: 2017-08-14
Attending: INTERNAL MEDICINE
Payer: MEDICARE

## 2017-08-14 DIAGNOSIS — N18.4 STAGE 4 CHRONIC KIDNEY DISEASE (H): ICD-10-CM

## 2017-08-14 LAB
ANION GAP SERPL CALCULATED.3IONS-SCNC: 13 MMOL/L (ref 3–14)
BASE DEFICIT BLDV-SCNC: 10.3 MMOL/L
BASE DEFICIT BLDV-SCNC: 11.2 MMOL/L
BASE DEFICIT BLDV-SCNC: 8.6 MMOL/L
BUN SERPL-MCNC: 48 MG/DL (ref 7–30)
CA-I BLD-MCNC: 4.1 MG/DL (ref 4.4–5.2)
CA-I BLD-MCNC: 4.3 MG/DL (ref 4.4–5.2)
CALCIUM SERPL-MCNC: 6.5 MG/DL (ref 8.5–10.1)
CHLORIDE SERPL-SCNC: 119 MMOL/L (ref 94–109)
CO2 SERPL-SCNC: 16 MMOL/L (ref 20–32)
CREAT SERPL-MCNC: 5.49 MG/DL (ref 0.52–1.04)
DEPRECATED CALCIDIOL+CALCIFEROL SERPL-MC: 15 UG/L (ref 20–75)
ERYTHROCYTE [DISTWIDTH] IN BLOOD BY AUTOMATED COUNT: 22.1 % (ref 10–15)
GFR SERPL CREATININE-BSD FRML MDRD: 8 ML/MIN/1.7M2
GLUCOSE BLDC GLUCOMTR-MCNC: 114 MG/DL (ref 70–99)
GLUCOSE BLDC GLUCOMTR-MCNC: 126 MG/DL (ref 70–99)
GLUCOSE BLDC GLUCOMTR-MCNC: 129 MG/DL (ref 70–99)
GLUCOSE BLDC GLUCOMTR-MCNC: 172 MG/DL (ref 70–99)
GLUCOSE BLDC GLUCOMTR-MCNC: 194 MG/DL (ref 70–99)
GLUCOSE BLDC GLUCOMTR-MCNC: 200 MG/DL (ref 70–99)
GLUCOSE SERPL-MCNC: 96 MG/DL (ref 70–99)
HCO3 BLDV-SCNC: 16 MMOL/L (ref 21–28)
HCO3 BLDV-SCNC: 17 MMOL/L (ref 21–28)
HCO3 BLDV-SCNC: 19 MMOL/L (ref 21–28)
HCT VFR BLD AUTO: 25 % (ref 35–47)
HGB BLD-MCNC: 7.6 G/DL (ref 11.7–15.7)
INR PPP: 1.36 (ref 0.86–1.14)
MAGNESIUM SERPL-MCNC: 2 MG/DL (ref 1.6–2.3)
MCH RBC QN AUTO: 24.8 PG (ref 26.5–33)
MCHC RBC AUTO-ENTMCNC: 30.4 G/DL (ref 31.5–36.5)
MCV RBC AUTO: 81 FL (ref 78–100)
O2/TOTAL GAS SETTING VFR VENT: 21 %
O2/TOTAL GAS SETTING VFR VENT: 21 %
O2/TOTAL GAS SETTING VFR VENT: ABNORMAL %
PCO2 BLDV: 38 MM HG (ref 40–50)
PCO2 BLDV: 48 MM HG (ref 40–50)
PCO2 BLDV: 48 MM HG (ref 40–50)
PH BLDV: 7.16 PH (ref 7.32–7.43)
PH BLDV: 7.2 PH (ref 7.32–7.43)
PH BLDV: 7.24 PH (ref 7.32–7.43)
PHOSPHATE SERPL-MCNC: 6.1 MG/DL (ref 2.5–4.5)
PLATELET # BLD AUTO: 220 10E9/L (ref 150–450)
PO2 BLDV: 29 MM HG (ref 25–47)
PO2 BLDV: 32 MM HG (ref 25–47)
PO2 BLDV: 64 MM HG (ref 25–47)
POTASSIUM SERPL-SCNC: 4.9 MMOL/L (ref 3.4–5.3)
RBC # BLD AUTO: 3.07 10E12/L (ref 3.8–5.2)
SODIUM SERPL-SCNC: 147 MMOL/L (ref 133–144)
VANCOMYCIN SERPL-MCNC: 24.4 MG/L
WBC # BLD AUTO: 7.7 10E9/L (ref 4–11)

## 2017-08-14 PROCEDURE — 93975 VASCULAR STUDY: CPT | Mod: TC

## 2017-08-14 PROCEDURE — 40000275 ZZH STATISTIC RCP TIME EA 10 MIN

## 2017-08-14 PROCEDURE — 82330 ASSAY OF CALCIUM: CPT | Performed by: STUDENT IN AN ORGANIZED HEALTH CARE EDUCATION/TRAINING PROGRAM

## 2017-08-14 PROCEDURE — A9270 NON-COVERED ITEM OR SERVICE: HCPCS | Mod: GY | Performed by: STUDENT IN AN ORGANIZED HEALTH CARE EDUCATION/TRAINING PROGRAM

## 2017-08-14 PROCEDURE — 12000006 ZZH R&B IMCU INTERMEDIATE UMMC

## 2017-08-14 PROCEDURE — 25000132 ZZH RX MED GY IP 250 OP 250 PS 637: Mod: GY | Performed by: INTERNAL MEDICINE

## 2017-08-14 PROCEDURE — 82803 BLOOD GASES ANY COMBINATION: CPT | Performed by: INTERNAL MEDICINE

## 2017-08-14 PROCEDURE — 99233 SBSQ HOSP IP/OBS HIGH 50: CPT | Mod: GC | Performed by: INTERNAL MEDICINE

## 2017-08-14 PROCEDURE — 25000132 ZZH RX MED GY IP 250 OP 250 PS 637: Mod: GY | Performed by: STUDENT IN AN ORGANIZED HEALTH CARE EDUCATION/TRAINING PROGRAM

## 2017-08-14 PROCEDURE — 85610 PROTHROMBIN TIME: CPT | Performed by: STUDENT IN AN ORGANIZED HEALTH CARE EDUCATION/TRAINING PROGRAM

## 2017-08-14 PROCEDURE — 36415 COLL VENOUS BLD VENIPUNCTURE: CPT | Performed by: STUDENT IN AN ORGANIZED HEALTH CARE EDUCATION/TRAINING PROGRAM

## 2017-08-14 PROCEDURE — A9270 NON-COVERED ITEM OR SERVICE: HCPCS | Mod: GY | Performed by: INTERNAL MEDICINE

## 2017-08-14 PROCEDURE — 36415 COLL VENOUS BLD VENIPUNCTURE: CPT | Performed by: INTERNAL MEDICINE

## 2017-08-14 PROCEDURE — 82803 BLOOD GASES ANY COMBINATION: CPT | Performed by: STUDENT IN AN ORGANIZED HEALTH CARE EDUCATION/TRAINING PROGRAM

## 2017-08-14 PROCEDURE — 83735 ASSAY OF MAGNESIUM: CPT | Performed by: STUDENT IN AN ORGANIZED HEALTH CARE EDUCATION/TRAINING PROGRAM

## 2017-08-14 PROCEDURE — 85027 COMPLETE CBC AUTOMATED: CPT | Performed by: STUDENT IN AN ORGANIZED HEALTH CARE EDUCATION/TRAINING PROGRAM

## 2017-08-14 PROCEDURE — 82306 VITAMIN D 25 HYDROXY: CPT | Performed by: STUDENT IN AN ORGANIZED HEALTH CARE EDUCATION/TRAINING PROGRAM

## 2017-08-14 PROCEDURE — 80202 ASSAY OF VANCOMYCIN: CPT | Performed by: INTERNAL MEDICINE

## 2017-08-14 PROCEDURE — 25000128 H RX IP 250 OP 636: Performed by: INTERNAL MEDICINE

## 2017-08-14 PROCEDURE — 82330 ASSAY OF CALCIUM: CPT | Performed by: INTERNAL MEDICINE

## 2017-08-14 PROCEDURE — 80048 BASIC METABOLIC PNL TOTAL CA: CPT | Performed by: STUDENT IN AN ORGANIZED HEALTH CARE EDUCATION/TRAINING PROGRAM

## 2017-08-14 PROCEDURE — 84100 ASSAY OF PHOSPHORUS: CPT | Performed by: STUDENT IN AN ORGANIZED HEALTH CARE EDUCATION/TRAINING PROGRAM

## 2017-08-14 PROCEDURE — 00000146 ZZHCL STATISTIC GLUCOSE BY METER IP

## 2017-08-14 RX ORDER — FUROSEMIDE 10 MG/ML
60 INJECTION INTRAMUSCULAR; INTRAVENOUS ONCE
Status: COMPLETED | OUTPATIENT
Start: 2017-08-14 | End: 2017-08-14

## 2017-08-14 RX ADMIN — SODIUM BICARBONATE 650 MG TABLET 650 MG: at 14:40

## 2017-08-14 RX ADMIN — CALCIUM GLUCONATE 2 G: 94 INJECTION, SOLUTION INTRAVENOUS at 12:18

## 2017-08-14 RX ADMIN — SODIUM BICARBONATE 650 MG TABLET 650 MG: at 09:39

## 2017-08-14 RX ADMIN — FUROSEMIDE 60 MG: 10 INJECTION, SOLUTION INTRAVENOUS at 12:21

## 2017-08-14 RX ADMIN — ACETAMINOPHEN 650 MG: 325 TABLET, FILM COATED ORAL at 01:18

## 2017-08-14 RX ADMIN — PIPERACILLIN AND TAZOBACTAM 2.25 G: 2; .25 INJECTION, POWDER, LYOPHILIZED, FOR SOLUTION INTRAVENOUS; PARENTERAL at 03:47

## 2017-08-14 RX ADMIN — VITAMIN D, TAB 1000IU (100/BT) 2000 UNITS: 25 TAB at 14:40

## 2017-08-14 RX ADMIN — PIPERACILLIN AND TAZOBACTAM 2.25 G: 2; .25 INJECTION, POWDER, LYOPHILIZED, FOR SOLUTION INTRAVENOUS; PARENTERAL at 20:24

## 2017-08-14 RX ADMIN — PIPERACILLIN AND TAZOBACTAM 2.25 G: 2; .25 INJECTION, POWDER, LYOPHILIZED, FOR SOLUTION INTRAVENOUS; PARENTERAL at 12:26

## 2017-08-14 RX ADMIN — SODIUM BICARBONATE 50 MEQ: 84 INJECTION, SOLUTION INTRAVENOUS at 18:30

## 2017-08-14 RX ADMIN — CALCIUM ACETATE 667 MG: 667 CAPSULE ORAL at 18:41

## 2017-08-14 RX ADMIN — SODIUM BICARBONATE 650 MG TABLET 650 MG: at 20:24

## 2017-08-14 RX ADMIN — VANCOMYCIN HYDROCHLORIDE 1250 MG: 10 INJECTION, POWDER, LYOPHILIZED, FOR SOLUTION INTRAVENOUS at 21:33

## 2017-08-14 RX ADMIN — CALCIUM ACETATE 667 MG: 667 CAPSULE ORAL at 09:38

## 2017-08-14 RX ADMIN — GABAPENTIN 300 MG: 300 CAPSULE ORAL at 21:43

## 2017-08-14 RX ADMIN — FERROUS SULFATE TAB 325 MG (65 MG ELEMENTAL FE) 325 MG: 325 (65 FE) TAB at 09:39

## 2017-08-14 NOTE — PROGRESS NOTES
[facility], [place]    Medicine Progress Note    Date of Service (when Attending saw the patient): 08/14/2017    Interval History   No overnight hx.  Slept okay, bipap mask put on overnight.  Otherwise tolerating room air now.  Patient tolerating breakfast this AM.  Denies n/v.  Has some lower abdominal pain in setting of possible UTI.       Assessment & Plan   Medical Student Note Resident Note   Assessment and Plan (Student)     Assessment:  Ms. Michael is a 76 y/o woman w/ a PMH of CKD4, A-Fib s/p ablation, CAD s/p CABG x5, HTN, and T2DM who presents from [facility] w/ MARTÍN on CKD, metabolic acidosis in the setting of suspected cellulitis and UTI/pyelonephritis.       Plan:  #MARTÍN on CKDV: Patient followed as outpatient by Dr. Barfield. CKD thought to be multifactorial and secondary to DM, HTN, renovascular disease, and previous MARTÍN. No evidence of structural kidney disease by ultrasound. Most recent episodes secondary to volume overload and aggressive diureses. Baseline Cr of 2.3-2.5, on admission today, found to be 5.18.  Now at 5.29.  Unclear etiology of current decompensation, possibly secondary to HF exacerbation vs infection.  Plan was to place tunneled dialysis catheter today; renal said not today, maybe in future.  - Nephrology consulted; begin diuresis today (see below)  - Monitor creatinine  -Doppler ultrasound today to evaluate renal artery flow     #Possible Cellulitis:  WBCs stable at 7. CRP was 30y/d. ESR was 92 y/d. Legs/feet are wrapped limiting physical exam.  Toes/thighs are warm with good capillary refill suggesting arterial insufficiency unlikely; however could be sepsis masking an underlying arterial insufficiency. Presented to the ED for increasing bilateral leg pain, but worse in right leg.  Has had leg pain [id number].  Has had bilateral ulcers on the dorsum of her lower legs for weeks that have been getting worse.  Prior history of cellulitis as well.   Previously found to be MRSA negative.  Ct negative for deep tissue infection.  Wound cultures growing non-lactose fermenting gram negatives (i.e, possible pseudomonas).    -Ankle-brachial index today  -vascular dressings for wounds  -elevate legs as much as possible  -Blood cultures NGTD  -Wound care consulted   -cont. zosyn (pip/tazo)   -Trend inflammatory/infection markers     #Possible UTI:  Patient reports dysuria, increased frequency for past 2 weeks.  U/A today showed moderate blood, 100mg/dl albumin, large leukocyte esterase w/ 4649 wbc/hpf and few bacteria.  Enterococcus grown with prior UTIs.   -Urine culture pending  -cont. vanc to cover possible enterococcal infection.     #Metabolic acidosis, non-anion gap, improving: Likely secondary to hyperchloridemia in setting of acute renal failure. Baseline bicarbonate low at 21, so on sodium bicarbonate tabs TID at home.  On arrival, pH of 7.06 per VBG and CO2 of 13 per BMP.  Now at pH 7.24 with CO2 of 16.  Received an amp of bicarb overnight for pH 7.2.  - Nephrology consulted, appreciate recommendations.   - VBG Q 6 with goal pH>7.2  - Continue home Sodium Bicarbonate tabs 650mg tid     #Electrolytes  Potassium was 5.6 on admission, now 4.9.  Mg2+ normal. Ionized calcium low at 4.1. Phosphorous elevated at 6.1.    -start phoslo (calcium acetate) TID 667mg w/ meals  -monitor BMP, iCa, Mg.   -start Calcium gluconate 1g in D5W 100ml     #BP/Volume:  Stable with /60, HR 64.  Hypotensive this AM and on admission y/d.  Hypotension possibly 2/2 third spacing from UTI/cellulitis sepsis.  BNP elevated suggesting volume overload 2/2 renal failure.    -Neck US to assess volume status using internal jugular v.  -Consider restarting lasix in setting if not vascularly deplete.     #HFpEF: Echo today showed normal LVEF 60-65% with moderately reduced RV function.  Elevated pulmonary artery pressure (in setting of volume overload).  We repeated echo in setting of volume  overload to check heart function.  Hx of HF exacerbations 2/2 non-compliance w/ home diuresis.  Not clinically hypervolemic on examination, however BNP 46k y/d and CT w/ diffuse anasarca possible 2/2 to renal osteodystrophy or GI malabsorption 2/2 diarrhea.  Hypotensive on presentation to ED, but now hemodynamically stable.   -Hold diuresis in setting of MARTÍN, and low blood pressure  -monitor daily weights  -monitor I/O     #Supratherapeutic INR, improving: Patient last reports warfarin taken 8/11 despite being d/c in cardiology clinic 2 days prior.  S/p vitamin K.  INR was 5.47 y/d; now 1.36.  IR will not do tunnel dialysis line if INR is elevated.  Nephro recommended not doing the procedure today.  -Follow nephro recs on tunnel cath dialysis placement  -Trend INR  -give vit. k    #Incidental gluteal cyst vs abscess: Per CT, but not evident on examination.   - Monitor for now; if not improving clinically  - Surgery doesn't recommend I & D     #Anemia 2/2 to Iron Deficiency & CKD  Hb is 7.3 down from 8.2 y/d.  Serum iron is low at 20.  MCV at 80.  Anemia could also be 2/2 to CKD, I.e., decreased kidney EPO production.  -start ferrous sulfate tab 325mg qd  -transfuse PRBC if <7; monitor for now     Cardiovascular:  #Hx of A-fib s/p ablation 6/2017: Follows w/ Dr. King of cardiology and seen last on 8/10.  In sinus rhythm, so warfarin d/c and metoprolol recently decreased 50mg->25mg  -monitor vitals  -hold metoprolol in setting of hypotension & bradycardia     #CAD s/p CABGx5  Resume asparin today after IR CVC Tunnel placement      T2DM, last A1C 7.3:  Not currently on any meds.  Blood sugars <150  -Monitor POCT glucoses     # Enteritis: Patient has 1 loose stool/day. Complains of suprapubic pain could be 2/2 UTI. CT with mesenteric haziness concerning for enteritis but could be 2/2 to anasarca.  Diarrhea once/day for past two months possibly started after UTI treatment 6-8 weeks ago.  Blood in stool 2x/month.    -  Monitor  - Abx as below     #Nutrition  Renal diet     Pain:  -Tylenol PRN  -Cont. Home tramadol; renal dose given visual hallucinations w/ other opioids/inability to take NSAID's  -Continue gabapentin         Disposition Plan: 4-5 days pending clinical improvement, tolerating dialysis, improving labs and evaluation/treatment of lower leg pain and UTI/pyelo.   Assessment & Plan:  Kathryn Banks is a 77 year old female with a history of CAD s/p CABD x5, HFpEF (60-65%) CKD 4, anemia, Type 2 DM, AFib s/p ablation 6/2017, presents with acute on chronic kidney disease and metabolic acidosis in the setting of possible cellulitis vs UTI as source, now improved on day 2 of antibiotics.      Metabolic acidosis- On admission pH was 7.06. Initially there was concern that Kathryn would need dialysis, however with treatment of the infection with antibiotics, fluids (received about 1.75L of fluids from the time of transfer to [place], to transfer from ICU to floor) as well as bicarb (total of 8 amps) and pH has been in the 7.20-7.25 range today. Baseline bicarb at home is 21- currently 18.   - Nephrology following, appreciate recs.   - Per nephrology, no need for dialysis today.   - Continue home bicarb TID  - Continue to monitor, can do VBG q12hrs.      MARTÍN on CKD stage 4- Etiology of MARTÍN is most likely secondary to sepsis causing third spacing from the UTI vs the lower extremity wounds, versus cardiorenal picture of diffuse anasarca as evidenced on CT causing venous congestion and subsequently decreasing perfusion through the kidneys. Clinically it is more likely to be sepsis with not enough intravascular volume. Urine output has improved- was anuric on day of admission (UOP 90cc) however significantly improved during the day yesterday and today.    - Per renal, does not need dialysis today, will hold off on placing line, however will reassess tomorrow.   - Nephrology following, appreciate recs.   - Treat infection with  antibiotics  - Continue to monitor BMP  - I/O     UTI- has been having symptoms of burning and frequency for the past couple of weeks. She has had decreased urinary output in the past two days, however improved today. UA was turbid, and UCx is pending.   - f/u UCx   - Continue Unasyn 3g daily.   - Kidney ultrasound today.      Lower extremity ulcers- etiology unclear, possibly due to arterial insuffieciency causing ulcers with super-infection. CT was negative for presence of deep tissue infection. Surgery gave recs on dressing changes, and wound care has been following. No debridement necessary now.   - Wound care following  - Continue Unasyn 3g daily.   - LACI today, and depending on results can consult vascular.      HFpEF EF 60-65%- Last echo was 5/31 with EF 60-65%, concentric LV hypertrophy, biatrial enlargement, and elevated RV pressures. Has had a history of non-compliance with home diuresis. BNP was in the 40k, however with her renal failure and oliguria there is extra fluid on board which would cause an increase in her BNP. Her CT scan showed diffuse anasarca, showing that she will need diuresis eventually, however currently she needs more intravascular volume to manage effects from systemic infection.   - She is ready to start diuresis today, will attempt Lasix, and if she is too resistant can try mitolazone followed by lasix.    - Daily weights  - I/O     Hx of AFib, s/p ablation 6/2017-Follows w/ Dr. King of cardiology, last seen on 8/10, at which time warfarin was d/c'ed and metoprolol was decreased. In sinus rhythm.   - Continue telemetry  - hold home metoprolol due to hypotension.   - INR has come down to 1.3 today.      Small bilateral pleural effusions in s/o HF-   No problems breathing, however blunting of costophrenic ancle on CXR and small bilateral pulm effusions on CT.   - Maintain O2 sats above 92%  - can start diuresis today.      CAD s/p CABD x5  - Holding aspirin for now, pending tunneled  line.      Abdominal pain and diarrhea- per patient is chronic, with some blood from hemmheroids. CT shows mesenteric haziness- edema vs enteritis- however WBC and lactate are wnl, so unlikely due to ischemic or infectious process. Although there is a decrease in hgb, this is due to a dilutional effect, as evidenced with the rest of the CBC.   - Montior and treat symptoms as needed    Gluteal cyst- Incidental finding, most likely cyst but if there is a concern for infection and source is unclear, can revisit this as a source. Currently looks benign, and patient is not having symptoms.      Chronic pain- some is caused by diabetic neuropathy, per patient. Wound management for pain from ulcers.  - continue home gabapentin.   - Tylenol PRN.      CODE: FULL  DVT: SCD  FEN: Renal diet  Disposition: Pending medical optimization with regard to acidemia, MARTÍN, and infection.      Patient staffed with Dr. Epperson who agrees with above plan      Physical Exam (Student)  /78 (BP Location: Left arm)  Pulse 74  Temp 97.5  F (36.4  C) (Oral)  Resp 18  Wt 87.1 kg (192 lb)  SpO2 98%  BMI 32.96 kg/m2    Constitutional: Appears well/peaceful, AOx3, lying in bed, not in distress  HEENT: EOMI  Respiratory: Good airflow, crackles lower right lobe  Cardiovascular: normal S1/S2, RRR, asymptomatic systolic ejection murmer grade 2 w/ possible radiation to carotids  Skin/Integumen: Lower legs/feet are wrapped; hard to evaluate.  Toes/thighs are warm to touch.      Data Interpretation  I have reviewed today's vital signs, medications, labs and imaging which are significant for MARTÍN on CKD, metabolic acidosis responsive to bicard and NS, in the setting of suspected cellulitis and possible UTI/pyelonephritis.   Physical Exam:  Blood pressure 98/50, temperature 98  F (36.7  C), temperature source Oral, resp. rate 15, weight 83.3 kg (183 lb 11.2 oz), SpO2 96 %.     I/O last 3 completed shifts:  In: 1440 [I.V.:1440]  Out: 215  [Urine:215]     General: alert and no distress, conversant and pleasant.   Head: NC/AT  Eyes: non-icteric sclera, EOMI  Pulmonary: Fine crackles at base of right lung, otherwise good air movement and clear to auscultation.   CV: RRR, systolic ejection murmur 2/6. RRR.   GI: soft, non-tender, non-distended + bowel sounds  Skin: no rashes seen on exposed skin. Her shins and legs were covered in dressings.   MSK: no deformity.   EXT: Some edema of lower extremities. Toes are warm and well perfused with cap refill <2sec.   Neuro: A&Ox3, grossly intact, no focal deficits   {MEDICAL STUDENT SIGNATURE: MAGDIEL Ventura 8/14/2017 1:42 PM     Medications        calcium gluconate  2 g Intravenous Once     vancomycin (VANCOCIN) IV  1,250 mg Intravenous Once     vancomycin place maki - receiving intermittent dosing  1 each Does not apply See Admin Instructions     ferrous sulfate  325 mg Oral Daily     calcium acetate  667 mg Oral TID w/meals     piperacillin-tazobactam  2.25 g Intravenous Q8H DAFNE     gabapentin  300 mg Oral At Bedtime     sodium bicarbonate  650 mg Oral TID       Data     Recent Labs  Lab 08/14/17  0817 08/13/17  0552 08/13/17  0210 08/12/17  2210 08/12/17  1616 08/12/17  1013   WBC 7.7 7.0  --   --  7.3 9.4   HGB 7.6* 7.3*  --   --  7.8* 8.2*   MCV 81 80  --   --  80 83    216  --   --  248 286   INR 1.36*  --  2.35*  --  5.47*  --    * 148*  --  147* 149* 146*   POTASSIUM 4.9 5.1  --  5.0 5.2 5.6*   CHLORIDE 119* 121*  --  121* 125* 124*   CO2 16* 18*  --  18* 14* 13*   BUN 48* 44*  --  43* 41* 42*   CR 5.49* 5.29*  --  5.13* 5.07* 5.18*   ANIONGAP 13 9  --  9 10 9   MALICK 6.5* 6.4*  --  6.2* 6.4* 6.4*   GLC 96 120*  --  103* 125* 136*   ALBUMIN  --   --   --   --  1.9* 2.1*   PROTTOTAL  --   --   --   --  4.7* 5.0*   BILITOTAL  --   --   --   --  0.2 0.2   ALKPHOS  --   --   --   --  216* 228*   ALT  --   --   --   --  44 49   AST  --   --   --   --  32 45       Recent  Results (from the past 24 hour(s))   US Renal Complete    Narrative    EXAMINATION: US RENAL COMPLETE, 8/13/2017 1:59 PM     COMPARISON: CT dated 12/20/2017    HISTORY: 77-year-old female with CT kidney stage V with a UTI and  purulent urine.    FINDINGS:    Right kidney: Measures 8.9 cm in length. Parenchyma is of normal  thickness with mildly increased echogenicity. No focal mass. No  hydronephrosis.    Left kidney: Measures 8.9 cm in length. There is a 2.2 cm simple cyst.  Parenchyma is of normal thickness with mildly increased echogenicity.  No focal mass. No hydronephrosis.     Bladder: Unremarkable.      Impression    IMPRESSION:Somewhat echogenic appearance of the renal parenchyma  compatible with medical renal disease. No hydronephrosis.    I have personally reviewed the examination and initial interpretation  and I agree with the findings.    MISTY TERRY MD

## 2017-08-14 NOTE — PROGRESS NOTES
Pt arrived to unit around 0030 via wheelchair from 4C. VS stable. NSR. Pt is alert and oriented, forgetful intermittently. Belongings at bedside. Lymph wraps on and intact. PIV saline locked. Oriented to room and call light. Will continue to monitor and follow plan of care.

## 2017-08-14 NOTE — PROGRESS NOTES
[facility]  Patient is currently open to home care services with [facility].  The patient is currently receiving RN/SW and Telemonitoring services.  [facility]  and team have been notified of patient admission.  [facility] liaison will continue to follow patient during stay.  If appropriate provide orders to resume home care at time of discharge.    Kathryn Mcnulty RN, BSN  [facility] Liaison  [phone]

## 2017-08-14 NOTE — PHARMACY-VANCOMYCIN DOSING SERVICE
Pharmacy Vancomycin Initial Note  Date of Service 2017  Patient's  1940  77 year old, female    Indication: Skin and Soft Tissue Infection, UTI    Current estimated CrCl = Estimated Creatinine Clearance: 9.6 mL/min (based on Cr of 5.29).    Creatinine for last 3 days  2017: 10:13 AM Creatinine 5.18 mg/dL;  4:16 PM Creatinine 5.07 mg/dL; 10:10 PM Creatinine 5.13 mg/dL  2017:  5:52 AM Creatinine 5.29 mg/dL    Recent Vancomycin Level(s) for last 3 days  2017:  5:09 PM Vancomycin Level 17.2 mg/L      Vancomycin IV Administrations (past 72 hours)                   vancomycin (VANCOCIN) 2,000 mg in NaCl 0.9 % 500 mL intermittent infusion (mg) 2,000 mg New Bag 17 1813                Nephrotoxins and other renal medications (Future)    Start     Dose/Rate Route Frequency Ordered Stop    17  vancomycin (VANCOCIN) 1,250 mg in D5W 250 mL intermittent infusion      1,250 mg Intravenous ONCE 17 202  vancomycin place maki - receiving intermittent dosing      1 each Does not apply SEE ADMIN INSTRUCTIONS 17 20217 1600  piperacillin-tazobactam (ZOSYN) 2.25 g vial to attach to  ml bag     Comments:  Ok for pharmacy to adjust    2.25 g  over 30 Minutes Intravenous EVERY 8 HOURS SCHEDULED 17 1547            Contrast Orders - past 72 hours (72h ago through future)    Start     Dose/Rate Route Frequency Ordered Stop    17 1200  perflutren diluted 1mL to 2mL with saline (OPTISON) diluted injection 3 mL      3 mL Intravenous ONCE 17 1148 17 1200                Plan:  1. Vancomycin re-initiated for UTI and SSTI. A vancomycin level at 1718 today = 17.2mcg/ml. Will give  vancomycin 1,250mg IV x 1 (15mg/kg dose).   2.  Goal Trough Level: 15-20 mg/L   3.  Pharmacy will check trough levels as appropriate in 1-3 Days. Further dosing will be per levels for now due to acutely fluctuating renal function.  4. Serum  creatinine levels will be ordered daily for the first week of therapy and at least twice weekly for subsequent weeks.    5. [place] method utilized to dose vancomycin therapy: Method 2    Anisa Wei, Pharm.D. Resident

## 2017-08-14 NOTE — TELEPHONE ENCOUNTER
Calcium Acetate 667mg      Last Written Prescription Date: 7/11/2017  Last Fill Quantity: 180,  # refills: 0   Last Office Visit with G, P or [facility] prescribing provider: 7/14/2017                                         Next 5 appointments (look out 90 days)     Aug 15, 2017  4:00 PM CDT   Office Visit with Dheeraj Jj MD   [facility] ([facility])    [address] [place] [id number]   [phone]            Aug 22, 2017  2:30 PM CDT   Return Visit with Vibha Barfield MD   [facility]    [address] [place] [id number]   [phone]

## 2017-08-14 NOTE — PROGRESS NOTES
Nephrology Progress Note  08/14/2017       Kathryn Banks is a 77 year old year old female  With hx of CKD stage 4 , due to chronic diabetes and CAD, presented with painful leg ulcer and cellulitis, MARTÍN on CKD, decreased UO and worsened metabolic acidosis (admission bicarb 13)  .    Assessment & Recommendations:     1. MARTÍN - Etiology felt to be ATN 2/2 infection, hypotension, CKD, Vanco. Pt has UTI with > 100 K Ecoli. BC have been neg. UA showed pro 100, Lge blood and Leuk esterace. She also had hypotension on 8/12 and 8/13 with SBP 80's/.    - No acute indication for dialysis today. GFR is unchanged this admission.    - Cancel TDC placement today and will reassess over the next several days   - Continue to monitor for recovery with daily chemistry labs   - Avoid nephrotoxins and hypotension   - Renal dose medications ( Vanc level is 24.4)    2. CKD3/4 - Etiology felt to be DM, HTN, previous MARTÍN events and possibly some component of CRS. Patient follows with Dr Barfield in [place].     3. Volume status - Hypervolemia with edema. No hypoxia, dyspnea. UO has increased or just better recorded over last 24  Hrs. 675 cc recorded over last 24 hrs. SBP teens - 120/. Admission weight 83.3 kg. Weight today 97.1 kg   - Primary team giving one dose of Lasix 60 mg IV today   - Daily standing weights please   - Continue to monitor I/O    4. HTN - Acceptable control with SBP teens - 120/. Holding PTA meds: Lopressor 25 mg bid, Amlodipine 10 mg qd, Lasix 40 mg qd.     - Continue to hold meds to prevent hypotension    5. Electrolytes - No acute concerns. K 4.9. Na 147   - Encourage Po fluid intake    6. Acid base - Metabolic Acidosis due to chronic diarrhea/MARTÍN/CKD. At baseline on Bicarb 650 mg TID. Currently bicarb 16   - Will increase tomorrow if no improvement    7. BMD - Ionized Ca 4.1, Phos 6.1   - Started on Phoslo 667 mg TID with meals on 8/13/17    8. Anemia - Hgb 7.6. Iron studies 8/13/17: Fe 20, Ferritin 188, Tsat  17%. On oral iron   - Patient is enrolled in anemia management services through Nephrology clinic    9. Antimicrobials - Zosyn, Vanco. Vanco level 24.4. WBC 7.7, Afebrile. BC neg    Recommendations were communicated to primary team via progress note    Seen and discussed with Dr. Lux Rodas, NP   [phone]    Interval History :   Creat continues to slowly rise  She is non oliguric  No uremic symptoms    Review of Systems:     I reviewed the following systems:  GI: Reports good appetite  Neuro:  no confusion  Constitutional:  no fever or chills  CV: denies dyspnea or CP. Has edema    Physical Exam:   I/O last 3 completed shifts:  In: 950 [I.V.:950]  Out: 550 [Urine:550]   /78 (BP Location: Left arm)  Pulse 74  Temp 97.5  F (36.4  C) (Oral)  Resp 18  Wt 87.1 kg (192 lb)  SpO2 98%  BMI 32.96 kg/m2     GENERAL APPEARANCE: Comfortable, NAD  EYES:  no scleral icterus, pupils equal  PULM: lungs CTA. Breathing is non labored. Not on supplemental oxygen.   CV: RRR      -edema- legs wrapped, tight with edema   GI: soft, NT, non distended   : Voiding w/o moreira  NEURO:  Alert/oriented    Labs:   All labs reviewed by me  Electrolytes/Renal -   Recent Labs   Lab Test  08/14/17 0817 08/13/17   0735  08/13/17   0552  08/12/17   2210   NA  147*   --   148*  147*   POTASSIUM  4.9   --   5.1  5.0   CHLORIDE  119*   --   121*  121*   CO2  16*   --   18*  18*   BUN  48*   --   44*  43*   CR  5.49*   --   5.29*  5.13*   GLC  96   --   120*  103*   MALICK  6.5*   --   6.4*  6.2*   MAG  2.0  1.9  1.8  1.8   --    PHOS  6.1*  6.4*  6.4*  6.4*   --        CBC -   Recent Labs   Lab Test  08/14/17   0817  08/13/17   0552  08/12/17   1616   WBC  7.7  7.0  7.3   HGB  7.6*  7.3*  7.8*   PLT  220  216  248       LFTs -   Recent Labs   Lab Test  08/12/17   1616  08/12/17   1013  07/17/17   1054   07/07/17   0616   ALKPHOS  216*  228*   --    --   197*   BILITOTAL  0.2  0.2   --    --   0.7   ALT  44  49   --    --    35   AST  32  45   --    --   18   PROTTOTAL  4.7*  5.0*   --    --   5.5*   ALBUMIN  1.9*  2.1*  2.5*   < >  2.7*    < > = values in this interval not displayed.       Iron Panel -   Recent Labs   Lab Test  08/13/17   0552  07/17/17   1054  06/21/17   0842   IRON  20*  23*  36   IRONSAT  17  16  17   GWEN  188  206  196         Imaging:  EXAMINATION: US RENAL COMPLETE, 8/13/2017 1:59 PM      COMPARISON: CT dated 12/20/2017     HISTORY: 77-year-old female with CT kidney stage V with a UTI and  purulent urine.     FINDINGS:     Right kidney: Measures 8.9 cm in length. Parenchyma is of normal  thickness with mildly increased echogenicity. No focal mass. No  hydronephrosis.     Left kidney: Measures 8.9 cm in length. There is a 2.2 cm simple cyst.  Parenchyma is of normal thickness with mildly increased echogenicity.  No focal mass. No hydronephrosis.      Bladder: Unremarkable.         IMPRESSION:Somewhat echogenic appearance of the renal parenchyma  compatible with medical renal disease. No hydronephrosis.    Current Medications:    calcium gluconate  2 g Intravenous Once     vancomycin (VANCOCIN) IV  1,250 mg Intravenous Once     vancomycin place maki - receiving intermittent dosing  1 each Does not apply See Admin Instructions     ferrous sulfate  325 mg Oral Daily     calcium acetate  667 mg Oral TID w/meals     piperacillin-tazobactam  2.25 g Intravenous Q8H DAFNE     gabapentin  300 mg Oral At Bedtime     sodium bicarbonate  650 mg Oral TID        Elsa Rodas, NP

## 2017-08-14 NOTE — PROGRESS NOTES
Care Coordinator Progress Note     Admission Date/Time:  8/12/2017  Attending MD:  Rajan Epperson MD     Data  Chart reviewed, discussed with interdisciplinary team.   Patient was admitted for: Acute kidney injury (nontraumatic) (H).    Concerns with insurance coverage for discharge needs: None.  Current Living Situation: Patient lives with spouse.  Support System: Supportive and Involved  Services Involved: Home Care  Transportation: Family or Friend will provide  Barriers to Discharge: Medical course, need for dialysis    Coordination of Care and Referrals: Provided patient/family with options for Home Care.        Assessment  Chart reviewed. Patient admitted with renal failure and history of CKD stage IV, a fib, DM 2, CAD, and HTN.   Met with patient at bedside to discuss discharge planning and needs. Patient lives at home with her . Open to [facility] for RN services three times per week; resumption orders placed. Discussed with patient that therapies are ordered to work with her while she is in the hospital and if they recommend home therapies is that something she would be open to having at home. At this time patient does not think that she needs therapies and would not like them added to her home care.   Plan this morning was for patient to get a tunnel catheter placed for possibility of HD initiation. Patient updated writer that they are no longer placing the TC at this time because her kidney function is improving. Confirmed this plan with Dr. Pierre; they will diures and continue to monitor kidney function for a few days. RNCC to continue to monitor for discharge planning and needs.      Plan  Anticipated Discharge Date:  TBD  Anticipated Discharge Plan:  Home with resumption of home care    Donna Dickerson RN

## 2017-08-14 NOTE — PROVIDER NOTIFICATION
Critical lab: VBG pH  Lab value: 7.16  Physician notified: Lake Pierre MD  Time: 1635  Response: 1 Amp Bicarb IV push ordered.

## 2017-08-14 NOTE — PROGRESS NOTES
CLINICAL NUTRITION SERVICES - ASSESSMENT NOTE     Nutrition Prescription    RECOMMENDATIONS FOR MDs/PROVIDERS TO ORDER:  - Recommend switch to renal dialysis diet to increase protein provisions d/t  LE wounds    Malnutrition Status:    - Patient does not meet two of the above criteria necessary for diagnosing malnutrition but is at risk for malnutrition    Recommendations already ordered by Registered Dietitian (RD):  - None at this time     Future/Additional Recommendations:  - Reassess estimated needs and need for additional supplements pending WOCN note for staging   - Recommend renal multivitamin once HD is initiated  - Monitor initiation of HD     REASON FOR ASSESSMENT  Kathryn Banks is a/an 77 year old female assessed by the dietitian for Admission Nutrition Risk Screen for stageable pressure injuries or large/non-healing wound or burn and new/uncontrolled diabetes    NUTRITION HISTORY  77 year old female with DM2, CKD, CAD, s/p CABG and H/O A-fib s/p ablation transferred from [facility] for worsening renal failure and metabolic acidosis. UTI with acute on chronic kidney failure, begin dialysis 8/13. Pt with possible cellulitis, and concern for possible deep seeded leg infection with ongoing ulcerations over the past month. Pt with ongoing diarrhea, with some nausea, reports a decent PO intake.     Per pt: Pt has had no decrease in appetite or oral intakes in recent past. Reports trying to follow a low phos/K+ diet more so now. Pt was unsure if dialysis was going to begin or if an ultrasound was going to happen first. Recommended high protein diet even though pt is not yet on HD, dt non-healing wounds.     CURRENT NUTRITION ORDERS  Diet: Renal (non-dialysis)  Intake/Tolerance: Pt with decent PO intakes, (good)    LABS  Phos: 6.1 (H), BUN: 48 (H), Cr: 5.49 (H), GFR:8 (L), iodized calcium whole: 4.1 (L), A1C: 7.3  Labs reviewed    MEDICATIONS  Phoslo, iron,Medications  "reviewed    ANTHROPOMETRICS  Height: 5'4\"  Most Recent Weight: 87.1 kg (192 lb)    IBW: 54.5 kg  BMI: Obesity Grade I BMI 30-34.9  Weight History: Wt increase, suspect fluids (+1.6L) and mild edema  Wt Readings from Last 10 Encounters:   08/14/17 87.1 kg (192 lb)   08/12/17 81.6 kg (180 lb)   08/10/17 84.5 kg (186 lb 3.2 oz)   07/17/17 80.9 kg (178 lb 6.4 oz)   07/14/17 80.7 kg (178 lb)   07/11/17 74.2 kg (163 lb 8 oz)   07/06/17 78.9 kg (174 lb)   06/28/17 78.9 kg (174 lb)   06/25/17 75.8 kg (167 lb)   06/21/17 75.6 kg (166 lb 9.6 oz)     Dosing Weight: 63 kg (adjusted)    ASSESSED NUTRITION NEEDS  Estimated Energy Needs: 5200-1448 kcals/day (25 - 30 kcals/kg)  Justification: Increased needs  Estimated Protein Needs:  grams protein/day (1.5 - 2 grams of pro/kg)  Justification: Dialysis- to begin today per MD notes  Estimated Fluid Needs: 1 mL/kcal/day   Justification: Per provider pending fluid status    PHYSICAL FINDINGS  See malnutrition section below.  None healing wounds- reassess needs based on WOCN note    MALNUTRITION  % Intake: No decreased intake noted  % Weight Loss: None noted  Subcutaneous Fat Loss: None observed  Muscle Loss: None observed  Fluid Accumulation/Edema: Mild  Malnutrition Diagnosis: Patient does not meet two of the above criteria necessary for diagnosing malnutrition but is at risk for malnutrition    NUTRITION DIAGNOSIS  Increased nutrient needs (pro) related to non healing wounds and possible initiation of HD     INTERVENTIONS  Implementation  Nutrition Education: Provided education on role of RD and nutrition POC as information to help reduce phos/K intakes.      Goals  Patient to consume % of nutritionally adequate meal trays TID, or the equivalent with supplements/snacks.     Monitoring/Evaluation  Progress toward goals will be monitored and evaluated per protocol.    Cyndi Lazo, RD, MS, LD  6B- Pager: [phone]    "

## 2017-08-14 NOTE — PROGRESS NOTES
[facility] Nurse Inpatient Wound Assessment     Initial Assessment of wound(s) on pt's:   Bilateral lower extremity         Data:   Patient History:      per MD note(s): Kathryn Banks is a 77 year old female with chronic lymphedema and stasis ulcers along with multiple other complex medical problems including coronary artery disease s/p CABG x 5, HFpEF (EF 60-65%), chronic kidney disease stage IV, anemia of chronic disease, type 2 diabetes with circulatory complications, and atrial fibrillation s/p recent ablation who presents from [facility] for acute renal failure, acidosis,  and concern for urgent dialysis need.      Kathryn presented to the ED this morning with intractable leg pain. She has had ongoing ulcerations of her lower extremities for the past month, which she reports were absent before then. In the past several days, the wounds have progressed with increased weeping, number of ulcers, swelling, and redness and her pain had become unmanageable at home. She contacted her primary care physician who prescribed tramadol for her, which did help with her pain.      She does have ongoing diarrhea on chart review and in addition to this has been having some nausea. Despite this, she reports decent oral intake. She denies any associated fevers or ill contacts.      On arrival to the ED, she was noted to be altered and lethargic and mildly hypotensive. Labs were notable for anemia, hypernatremia, hyperkalemia, and hyperchloridemia in addition to elevated creatinine of 5.18. Lactate was normal. BNP was elevated. Venous gas demonstrated acidosis with low bicarbonate. She received four amps of bicarbonate and 500 cc of normal saline prior to transfer to [facility] for consideration of urgent dialysis.          Moisture Management:  Diaper and wound care dressing     Current Diet / Nutrition:           Active Diet Order      Renal Diet (non-dialysis)           Allan Assessment and sub scores:   Allan  Score  Av.4  Min: 17  Max: 19     Labs:    Recent Labs   Lab Test  17   0817  17   0552   17   1616   17   0658   ALBUMIN   --    --    --   1.9*   < >   --    HGB  7.6*  7.3*   --   7.8*   < >   --    RBC  3.07*  3.01*   --   3.24*   < >   --    WBC  7.7  7.0   --   7.3   < >   --    PLT  220  216   --   248   < >   --    INR  1.36*   --    < >  5.47*   < >   --    A1C   --    --    --    --    --   7.3*   CRP   --   30.0*  30.0*   --   14.0*   --    --     < > = values in this interval not displayed.          Wound Assessment (location #1Bilateral lower extremity):  Wound History:                      Left lateral leg 17       Left anterior  leg 17   Right anterior  leg 17            Right lat  leg 17           Right  anterior  leg 17      Wound Base: dermis, moist and non-granulation    Specific Dimensions (length x width x depth, in cm) :      Tunneling:  N/A    Undermining: N/A    Palpation of the wound bed:  normal    Slough appearance:  none    Eschar appearance:  none    Periwound Skin: edema and erythema,      Color: red    Temperature  warm    Drainage:  . Amount: moderate . Color: serosanguinous     Odor: mild    Pain:  moderate , throbing          Intervention:     Patient's chart evaluated.      Wound(s) was fully assessed    Wound Care: was done:      Orders  Written    Supplies  Ordered: Polymem    Discussed plan of care with Patient and           Assessment:       Bilateral lower extremity chronic open wounds of unknown origin         Plan:     Nursing to notify the Provider(s) and re-consult the [facility] Nurse if wound(s) deteriorate(s).    Plan of care for wound located on bilateral lower extermity:  cleanse the wound with microklenz moisten gauze pat dry cut a piece of PolyMem roll dressing to fit the size of the wound and place over the wound and cover with / ABD pad and secure with Kerlix and wrap with ACe wraps change dressing daily and  as needed if soiled   WOC Nurse will return: kerry     Face to face time: 45 Minutes

## 2017-08-14 NOTE — PLAN OF CARE
Problem: Goal Outcome Summary  Goal: Goal Outcome Summary  D:  Temp: 97.5  F (36.4  C) Temp src: Oral BP: 169/73 Heart Rate: 60 Resp: 18 SpO2: 96 % O2 Device: None (Room air)  .  Patient vitally stable.  She has been up to bathroom with standby assist 3-4x today.  She received lasix, but each time she voided, the urine missed the hat and was not able to get an accurate output count. She denies pain.  Lymphedema RN dressed and wrapped her legs today.  She was NPO most of the afternoon pending renal doppler U/S and ankle/brachial U/S.  U/S was completed this evening.  She is currently eating dinner with family at bedside. Diabetes educator consult was placed per patient request.  She states that she has poor understanding of Diabetes disease and management.   P:  Will continue with plan of care and close monitoring.

## 2017-08-14 NOTE — PLAN OF CARE
Problem: Goal Outcome Summary  Goal: Goal Outcome Summary  Outcome: No Change  Neuro: A&Ox4. Forgetful intermittently   Cardiac: VSS, NSR/SB.          Respiratory: RA, home CPAP at night.    GI/: Voiding spontaneously. No BM this shift.   Diet/appetite: Tolerating renal diet. Denies nausea. Blood sugars monitored with meals and HS. Overnight check 126   Activity: Up with 2 assist, gait belt and walker   Pain: . Denies  Skin: Intact, erythema noted on perineum, barrier cream applied   Lines: PIV infusing TKO      Plan for tunneled dialysis catheter placement today  Pt has been resting comfortably throughout night, will continue to monitor and follow plan of care.

## 2017-08-15 ENCOUNTER — APPOINTMENT (OUTPATIENT)
Dept: PHYSICAL THERAPY | Facility: CLINIC | Age: 77
DRG: 291 | End: 2017-08-15
Attending: INTERNAL MEDICINE
Payer: MEDICARE

## 2017-08-15 ENCOUNTER — APPOINTMENT (OUTPATIENT)
Dept: ULTRASOUND IMAGING | Facility: CLINIC | Age: 77
DRG: 291 | End: 2017-08-15
Attending: INTERNAL MEDICINE
Payer: MEDICARE

## 2017-08-15 ENCOUNTER — APPOINTMENT (OUTPATIENT)
Dept: OCCUPATIONAL THERAPY | Facility: CLINIC | Age: 77
DRG: 291 | End: 2017-08-15
Attending: INTERNAL MEDICINE
Payer: MEDICARE

## 2017-08-15 LAB
ALDOLASE SERPL-CCNC: 4.7
AMIKACIN SUSC ISLT: <=2 UG/ML
AMIKACIN SUSC ISLT: <=4 UG/ML
AMPICILLIN SUSC ISLT: <=2 UG/ML
AMPICILLIN SUSC ISLT: >16 UG/ML
AMPICILLIN+SULBAC SUSC ISLT: >16 UG/ML
ANION GAP SERPL CALCULATED.3IONS-SCNC: 11 MMOL/L (ref 3–14)
BACTERIA SPEC CULT: ABNORMAL
BASE DEFICIT BLDV-SCNC: 7.8 MMOL/L
BASE DEFICIT BLDV-SCNC: 8.5 MMOL/L
BUN SERPL-MCNC: 45 MG/DL (ref 7–30)
C DIFF TOX B STL QL: NEGATIVE
CA-I BLD-MCNC: 3.9 MG/DL (ref 4.4–5.2)
CA-I BLD-MCNC: 4 MG/DL (ref 4.4–5.2)
CALCIUM SERPL-MCNC: 6.7 MG/DL (ref 8.5–10.1)
CEFEPIME SUSC ISLT: <=1 UG/ML
CEFEPIME SUSC ISLT: <=2 UG/ML
CEFTAZIDIME SUSC ISLT: 4 UG/ML
CEFTAZIDIME SUSC ISLT: <=1 UG/ML
CEFTRIAXONE SUSC ISLT: <=4 UG/ML
CHLORIDE SERPL-SCNC: 119 MMOL/L (ref 94–109)
CIPROFLOXACIN SUSC ISLT: 1 UG/ML
CIPROFLOXACIN SUSC ISLT: <=0.25 UG/ML
CIPROFLOXACIN SUSC ISLT: <=0.5 UG/ML
CO2 SERPL-SCNC: 18 MMOL/L (ref 20–32)
CREAT SERPL-MCNC: 5.51 MG/DL (ref 0.52–1.04)
ERYTHROCYTE [DISTWIDTH] IN BLOOD BY AUTOMATED COUNT: 22.3 % (ref 10–15)
GENTAMICIN SUSC ISLT: <=1 UG/ML
GENTAMICIN SUSC ISLT: <=1 UG/ML
GENTAMICIN SUSC ISLT: ABNORMAL
GFR SERPL CREATININE-BSD FRML MDRD: 8 ML/MIN/1.7M2
GLUCOSE BLDC GLUCOMTR-MCNC: 108 MG/DL (ref 70–99)
GLUCOSE BLDC GLUCOMTR-MCNC: 220 MG/DL (ref 70–99)
GLUCOSE BLDC GLUCOMTR-MCNC: 244 MG/DL (ref 70–99)
GLUCOSE SERPL-MCNC: 120 MG/DL (ref 70–99)
HCO3 BLDV-SCNC: 19 MMOL/L (ref 21–28)
HCO3 BLDV-SCNC: 19 MMOL/L (ref 21–28)
HCT VFR BLD AUTO: 25.2 % (ref 35–47)
HGB BLD-MCNC: 7.4 G/DL (ref 11.7–15.7)
INTERPRETATION ECG - MUSE: NORMAL
LEVOFLOXACIN SUSC ISLT: 0.5 UG/ML
LEVOFLOXACIN SUSC ISLT: 1 UG/ML
LEVOFLOXACIN SUSC ISLT: <=1 UG/ML
LINEZOLID SUSC ISLT: 2 UG/ML
Lab: ABNORMAL
MCH RBC QN AUTO: 24 PG (ref 26.5–33)
MCHC RBC AUTO-ENTMCNC: 29.4 G/DL (ref 31.5–36.5)
MCV RBC AUTO: 82 FL (ref 78–100)
MEROPENEM SUSC ISLT: <=0.25 UG/ML
MEROPENEM SUSC ISLT: <=1 UG/ML
MICRO REPORT STATUS: ABNORMAL
MICROORGANISM SPEC CULT: ABNORMAL
MICROORGANISM SPEC CULT: ABNORMAL
NITROFURANTOIN SUSC ISLT: 32 UG/ML
O2/TOTAL GAS SETTING VFR VENT: 21 %
O2/TOTAL GAS SETTING VFR VENT: 21 %
PCO2 BLDV: 43 MM HG (ref 40–50)
PCO2 BLDV: 48 MM HG (ref 40–50)
PENICILLIN SUSC ISLT: 2 UG/ML
PH BLDV: 7.21 PH (ref 7.32–7.43)
PH BLDV: 7.25 PH (ref 7.32–7.43)
PHOSPHATE SERPL-MCNC: 6.1 MG/DL (ref 2.5–4.5)
PIP+TAZO SUSC ISLT: 8 UG/ML
PIP+TAZO SUSC ISLT: <=8 UG/ML
PLATELET # BLD AUTO: 234 10E9/L (ref 150–450)
PO2 BLDV: 26 MM HG (ref 25–47)
PO2 BLDV: 38 MM HG (ref 25–47)
POTASSIUM SERPL-SCNC: 4.4 MMOL/L (ref 3.4–5.3)
QUINUPRISTIN+DALFOPRIST SUSC ISLT: 1 UG/ML
RBC # BLD AUTO: 3.08 10E12/L (ref 3.8–5.2)
SODIUM SERPL-SCNC: 148 MMOL/L (ref 133–144)
SPECIMEN SOURCE: ABNORMAL
SPECIMEN SOURCE: NORMAL
STREPTOMYCIN [SUSCEPTIBILITY]: ABNORMAL
TETRACYCLINE SUSC ISLT: >=16 UG/ML
TIGECYCLINE SUSC ISLT: <=0.12 UG/ML
TMP SMX SUSC ISLT: ABNORMAL UG/ML
TOBRAMYCIN SUSC ISLT: 2 UG/ML
TOBRAMYCIN SUSC ISLT: <=1 UG/ML
VANCOMYCIN SERPL-MCNC: 29.9 MG/L
VANCOMYCIN SUSC ISLT: <=0.5 UG/ML
WBC # BLD AUTO: 7.6 10E9/L (ref 4–11)

## 2017-08-15 PROCEDURE — 99233 SBSQ HOSP IP/OBS HIGH 50: CPT | Mod: GC | Performed by: INTERNAL MEDICINE

## 2017-08-15 PROCEDURE — 80048 BASIC METABOLIC PNL TOTAL CA: CPT | Performed by: STUDENT IN AN ORGANIZED HEALTH CARE EDUCATION/TRAINING PROGRAM

## 2017-08-15 PROCEDURE — 82803 BLOOD GASES ANY COMBINATION: CPT | Performed by: INTERNAL MEDICINE

## 2017-08-15 PROCEDURE — 87493 C DIFF AMPLIFIED PROBE: CPT | Performed by: INTERNAL MEDICINE

## 2017-08-15 PROCEDURE — 97535 SELF CARE MNGMENT TRAINING: CPT | Mod: GO | Performed by: OCCUPATIONAL THERAPIST

## 2017-08-15 PROCEDURE — 97162 PT EVAL MOD COMPLEX 30 MIN: CPT | Mod: GP | Performed by: PHYSICAL THERAPIST

## 2017-08-15 PROCEDURE — 25000132 ZZH RX MED GY IP 250 OP 250 PS 637: Mod: GY | Performed by: INTERNAL MEDICINE

## 2017-08-15 PROCEDURE — 40000193 ZZH STATISTIC PT WARD VISIT: Performed by: PHYSICAL THERAPIST

## 2017-08-15 PROCEDURE — 25000132 ZZH RX MED GY IP 250 OP 250 PS 637: Mod: GY | Performed by: STUDENT IN AN ORGANIZED HEALTH CARE EDUCATION/TRAINING PROGRAM

## 2017-08-15 PROCEDURE — A9270 NON-COVERED ITEM OR SERVICE: HCPCS | Mod: GY | Performed by: INTERNAL MEDICINE

## 2017-08-15 PROCEDURE — A9270 NON-COVERED ITEM OR SERVICE: HCPCS | Mod: GY | Performed by: STUDENT IN AN ORGANIZED HEALTH CARE EDUCATION/TRAINING PROGRAM

## 2017-08-15 PROCEDURE — 93922 UPR/L XTREMITY ART 2 LEVELS: CPT

## 2017-08-15 PROCEDURE — 25000128 H RX IP 250 OP 636: Performed by: INTERNAL MEDICINE

## 2017-08-15 PROCEDURE — 00000146 ZZHCL STATISTIC GLUCOSE BY METER IP

## 2017-08-15 PROCEDURE — 97530 THERAPEUTIC ACTIVITIES: CPT | Mod: GP | Performed by: PHYSICAL THERAPIST

## 2017-08-15 PROCEDURE — 80202 ASSAY OF VANCOMYCIN: CPT | Performed by: INTERNAL MEDICINE

## 2017-08-15 PROCEDURE — 12000006 ZZH R&B IMCU INTERMEDIATE UMMC

## 2017-08-15 PROCEDURE — 84100 ASSAY OF PHOSPHORUS: CPT | Performed by: STUDENT IN AN ORGANIZED HEALTH CARE EDUCATION/TRAINING PROGRAM

## 2017-08-15 PROCEDURE — 36415 COLL VENOUS BLD VENIPUNCTURE: CPT | Performed by: INTERNAL MEDICINE

## 2017-08-15 PROCEDURE — 97165 OT EVAL LOW COMPLEX 30 MIN: CPT | Mod: GO | Performed by: OCCUPATIONAL THERAPIST

## 2017-08-15 PROCEDURE — 85027 COMPLETE CBC AUTOMATED: CPT | Performed by: STUDENT IN AN ORGANIZED HEALTH CARE EDUCATION/TRAINING PROGRAM

## 2017-08-15 PROCEDURE — 82330 ASSAY OF CALCIUM: CPT | Performed by: INTERNAL MEDICINE

## 2017-08-15 PROCEDURE — 40000133 ZZH STATISTIC OT WARD VISIT: Performed by: OCCUPATIONAL THERAPIST

## 2017-08-15 RX ORDER — HYDRALAZINE HYDROCHLORIDE 10 MG/1
10 TABLET, FILM COATED ORAL ONCE
Status: COMPLETED | OUTPATIENT
Start: 2017-08-15 | End: 2017-08-15

## 2017-08-15 RX ORDER — SODIUM BICARBONATE 650 MG/1
1300 TABLET ORAL 3 TIMES DAILY
Status: DISCONTINUED | OUTPATIENT
Start: 2017-08-15 | End: 2017-08-18 | Stop reason: HOSPADM

## 2017-08-15 RX ORDER — ASPIRIN 81 MG/1
81 TABLET ORAL DAILY
Status: DISCONTINUED | OUTPATIENT
Start: 2017-08-15 | End: 2017-08-18 | Stop reason: HOSPADM

## 2017-08-15 RX ORDER — FUROSEMIDE 10 MG/ML
60 INJECTION INTRAMUSCULAR; INTRAVENOUS ONCE
Status: COMPLETED | OUTPATIENT
Start: 2017-08-15 | End: 2017-08-15

## 2017-08-15 RX ORDER — CALCIUM CARBONATE 500(1250)
1250 TABLET ORAL DAILY
Status: DISCONTINUED | OUTPATIENT
Start: 2017-08-16 | End: 2017-08-18 | Stop reason: HOSPADM

## 2017-08-15 RX ADMIN — VITAMIN D, TAB 1000IU (100/BT) 2000 UNITS: 25 TAB at 08:16

## 2017-08-15 RX ADMIN — CALCIUM ACETATE 667 MG: 667 CAPSULE ORAL at 13:02

## 2017-08-15 RX ADMIN — FUROSEMIDE 60 MG: 10 INJECTION, SOLUTION INTRAVENOUS at 16:00

## 2017-08-15 RX ADMIN — SODIUM BICARBONATE 650 MG TABLET 1300 MG: at 20:19

## 2017-08-15 RX ADMIN — SODIUM BICARBONATE 650 MG TABLET 650 MG: at 08:16

## 2017-08-15 RX ADMIN — GABAPENTIN 300 MG: 300 CAPSULE ORAL at 21:42

## 2017-08-15 RX ADMIN — PIPERACILLIN AND TAZOBACTAM 2.25 G: 2; .25 INJECTION, POWDER, LYOPHILIZED, FOR SOLUTION INTRAVENOUS; PARENTERAL at 05:03

## 2017-08-15 RX ADMIN — CALCIUM ACETATE 667 MG: 667 CAPSULE ORAL at 08:17

## 2017-08-15 RX ADMIN — HYDRALAZINE HYDROCHLORIDE 10 MG: 10 TABLET, FILM COATED ORAL at 20:19

## 2017-08-15 RX ADMIN — CALCIUM ACETATE 667 MG: 667 CAPSULE ORAL at 20:19

## 2017-08-15 RX ADMIN — SODIUM BICARBONATE 650 MG TABLET 1300 MG: at 16:00

## 2017-08-15 RX ADMIN — METOPROLOL TARTRATE 12.5 MG: 25 TABLET, FILM COATED ORAL at 20:19

## 2017-08-15 RX ADMIN — CEFEPIME 500 MG: 2 INJECTION, POWDER, FOR SOLUTION INTRAMUSCULAR; INTRAVENOUS at 13:02

## 2017-08-15 RX ADMIN — ASPIRIN 81 MG: 81 TABLET, COATED ORAL at 16:00

## 2017-08-15 RX ADMIN — TRAMADOL HYDROCHLORIDE 25 MG: 50 TABLET, FILM COATED ORAL at 05:02

## 2017-08-15 RX ADMIN — FERROUS SULFATE TAB 325 MG (65 MG ELEMENTAL FE) 325 MG: 325 (65 FE) TAB at 08:17

## 2017-08-15 ASSESSMENT — PAIN DESCRIPTION - DESCRIPTORS
DESCRIPTORS: BURNING
DESCRIPTORS: BURNING

## 2017-08-15 NOTE — PLAN OF CARE
Problem: Goal Outcome Summary  Goal: Goal Outcome Summary  OT 6B: OT eval completed and POC established. Progressed strength, balance and safety awareness for impendence in toileting, functional mobility and transfers. Pt requires min A for commode transfer, mod A for hygiene and clothing management. Min A for transfer to chair and SBA for ambulation ~10' around bed. Verbal cues for safety awareness and foresight to have walker available when needed, socks on feet, avoid catching lines, etc.  Recommend TCU at d/c, see eval for details.

## 2017-08-15 NOTE — PLAN OF CARE
Problem: Goal Outcome Summary  Goal: Goal Outcome Summary  Outcome: No Change     A: A&Ox3-4, forgetful. VS per baseline with exception of BP increase (MD notified, no new orders placed see flowsheet). SR. Afebrile. Complaining of lower extremities pain managed with prn tramadol  and nausea. Renal diet. Voiding moderately, one episode of incontinence. BM x2 overnight. Assist x1 with walker. Noted 2+ edema in upper and lower extremities, raised for comfort. Able to make needs known via call light.            Temp:  [97.4  F (36.3  C)-97.8  F (36.6  C)] 97.8  F (36.6  C)  Pulse:  [74-76] 76  Heart Rate:  [60-91] 91  Resp:  [16-18] 18  BP: (122-169)/() 166/103  SpO2:  [94 %-98 %] 95 %      R: Continue with POC. Notify primary team with changes.

## 2017-08-15 NOTE — PROGRESS NOTES
[facility], [place]    Medicine Progress Note    Date of Service (when Attending saw the patient): 08/15/2017    Interval History   Patient doing well, denies headache, n/v, fevers/chills.  Still has burning leg pain and persistent double vision.      Assessment & Plan   Medical Student Note Resident Note   Assessment and Plan (Student)     Assessment:  Ms. Michael is a 78 y/o woman w/ a PMH of CKD4, A-Fib s/p ablation, CAD s/p CABG x5, HTN, and T2DM who presents from [facility] w/ MARTÍN on CKD, metabolic acidosis, suspected cellulitis positive for pseudomonas and UTI positive for vancomycin, enterococcus and serratia.         Plan:  #MARTÍN on CKDV: Patient followed as outpatient by Dr. Barfield. CKD thought to be multifactorial and secondary to DM, HTN, renovascular disease, and previous MARTÍN.  Renal US showed somewhat echogenic appearance, no hydronephrosis consistent with CKD ; doppler useless 2/2 anasarca.  Baseline Cr of 2.3-2.5; now up to 5.51.  MARTÍN 2/2 HF exacerbation vs third spacing from sepsis in setting of possible renal vascular dx (i.e., stenosis).  Patient has 1+ pitting edema in legs and diffuse lung crackles after 60mg IV lasix y/d and at least 1100ml urine output y/d.  Weight up ~4lbs from admission.  BP now up to 166/103 suggestive still volume up.  -Renal following; appreciate recs  -Fluid restrict today  -Cont. IV lasix 60mg after moreira placement.   -Monitor I/Os more closely w/ moreira  -Daily weights  -Trend creatinine     #Bilateral Pseudomonal Cellulitis  Patient presented b/c worsening leg pain/erythema and increased # ulcers for the past month to the ED.  WBCs stable at 7.  Both legs have multiple ulcers of varying size (largest 3x2cm).  A couple ulcers were bleeding.  Both legs are diffusely erythematous and warm.    Pustular lesions on both legs consistent w/ cellulitis.  Cultures back w/ cefepime pseudomonas.    Prior history of cellulitis as well.     -Ankle-brachial index today  -vascular dressings for wounds  -elevate legs as much as possible  -Blood cultures NGTD  -Wound care consulted   -cont. zosyn (pip/tazo)   -Trend inflammatory/infection markers    #Lower extremity Arterial Insufficiency  On admission, bilateral legs/feet found to be cold with non-palpable pulses and high pain in the setting of prior CAD CABGx5 and recent A-fib ablation suggest possible arterial insufficiency.  LACI concerning for calcification which makes LACI determination not a reliable measurement and thus cannot r/o arterial insufficiency; further evaluation with arterial duplex may be helpful.    -Follow vascular surgery recs     #Possible UTI:  Patient reports dysuria, increased frequency for past 2 weeks.  Urine culture positive for Enterococcus pending Abx sensitivities  -Start linezolid as there is a possibility of VRE  -F/U sensitivities     #Metabolic acidosis, non-anion gap, improving: Likely 2/2 to uremia.  Baseline bicarbonate low at 21; bicarb currently 18 per BMP.   PH this AM was 7.25  -VBG Q 6 with goal pH>7.2  -Continue home Sodium Bicarbonate tabs 650mg tid     #Electrolytes  Presented with hyperkalemia 5.6; now 4.4.  Mg2+ is normal. Ionized calcium still low at 3.9 and  phosphorous elevated at 6.1.  PTH elevated ~700 3 weeks ago suggesting secondary hyperparathyroidism 2/2 to CKD.  Follow renal recs:  -cont. phoslo (calcium acetate) TID 667mg w/ meals  -start Calcium gluconate 1g in D5W 100ml  -monitor BMP, iCa, Mg.     #HFpEF: Echo on 8/13 showed normal LVEF 60-65% with moderately reduced RV function.  Elevated pulmonary artery pressure (in setting of volume overload).  We repeated echo in setting of volume overload to check heart function.  Hx of HF exacerbations 2/2 non-compliance w/ home diuresis.  Not clinically hypervolemic on examination, however BNP 46k y/d and CT w/ diffuse anasarca 2/2 MARTÍN.  Hypotensive on presentation to ED, but now w/ elevated  BPs.  -monitor daily weights  -monitor I/O     #Supratherapeutic INR, improving: Patient last reports warfarin taken 8/11 despite being d/c in cardiology clinic 2 days prior.  S/p vitamin K.  INR was 5.47 y/d; now 1.36. Nephrology following if they decide to place tunnel dialysis cath.      #Incidental gluteal cyst vs abscess: Per CT, but not evident on examination.   - Monitor for now; if patient isn't improving w/ current abx regimen for cellulitis and uti, consider further evaluation of CT findings.  In the meantime surgery doesn't rec. I & D.    #Anemia 2/2 to Iron Deficiency & CKD  Hb is 7.3 down from 8.2 y/d.  Serum iron is low at 20.  MCV at 80.  Anemia could also be 2/2 to CKD, I.e., decreased kidney EPO production.  -start ferrous sulfate tab 325mg qd  -transfuse PRBC if <7; monitor for now     Cardiovascular:  #Hx of A-fib s/p ablation 6/2017: Follows w/ Dr. King of cardiology and seen last on 8/10.  In sinus rhythm, so warfarin d/c and metoprolol recently decreased 50mg->25mg  -monitor vitals  -hold metoprolol in setting of hypotension & bradycardia     #CAD s/p CABGx5  Resume asparin today after IR CVC Tunnel placement      T2DM, last A1C 7.3:  Not currently on any meds.  Blood sugars <150  -Monitor POCT glucoses     # Enteritis: Patient has 1 loose stool/day. Complains of suprapubic pain could be 2/2 UTI. CT with mesenteric haziness concerning for enteritis but could be 2/2 to anasarca.  Diarrhea once/day for past two months possibly started after UTI treatment 6-8 weeks ago.  Blood in stool 2x/month.    - Monitor  - Abx as below     #Nutrition  Renal diet     Pain:  -Tylenol PRN  -Cont. Home tramadol; renal dose given visual hallucinations w/ other opioids/inability to take NSAID's  -Continue gabapentin         Disposition Plan: 4-5 days pending clinical improvement, tolerating dialysis, improving labs and evaluation/treatment of lower leg pain and UTI/pyelo.   Assessment & Plan:  Kathryn Banks is a  77 year old female with a history of CAD s/p CABD x5, HFpEF (60-65%) CKD 4, anemia, Type 2 DM, AFib s/p ablation 6/2017, presents with acute on chronic kidney disease and metabolic acidosis in the setting of possible cellulitis vs UTI as source       # MARTÍN on CKD stage 4  Etiology of MARTÍN not entirely clear, however initially looked prerenal from sepsis and poor PO intake. However, now looks more volume overloaded with weight up, so cardiorenal more of a consideration. Making urine with diuretics. Acidosis improving with sodium bicarb. Electrolytes look good. No acute need for HD  - Nephrology following, appreciate recs  - Volume status appears overloaded: lasix 60mg IV once, place moreira for strict I&Os  - Continue to monitor BMP  - Daily BMP  - Has nephrology f/u on 8/22/17     # Sepsis 2/2 UTI and bilateral LE cellulitis  Had intermittent low BP earlier in admission, along with end organ damage (MARTÍN). Urine cx growing E. Coli, Enterococcus, and Citrobacter. LE wound culture growing pseudomonas, other speciation pending. Was on zosyn and vancomycin.  - Switch to cefepime, continue vancomycin until while sensitivities on enterococcus finalize     # Non-anion gap metabolic acidosis  On admission pH was 7.06. Initially there was concern that Kathryn would need dialysis, however with treatment of the infection with antibiotics and sodium bicarbonate, her pH has improved. Her baseline CO2 is 21.   - Continue home bicarb TID, increase to 1300mg TID (per renal recs)  - VBG BID until stabilized    # Lower extremity ulcers  Etiology unclear, possibly due to arterial insufficiency vs venous insufficiency. CT was negative for presence of deep tissue infection. Wound care following.   - ABIs done, however vessels non-compressable so will need to repeat with toe-brachial index  - Vascular surgery consulted  - Antibiotics as above given likelihood of infected ulcers  - Tramadol PRN for pain     # HFpEF EF 60-65%  Last echo was  5/31 with EF 60-65%, concentric LV hypertrophy, biatrial enlargement, and elevated RV pressures. BNP was in the 40k, however with her renal failure and oliguria there is extra fluid on board which would cause an increase in her BNP.   - Lasix 60mg IV once, then reassess   - Daily weights  - I/O     # Hx of AFib, s/p ablation 6/2017  Follows w/ Dr. King of cardiology, last seen on 8/10, at which time warfarin was stopped and metoprolol was decreased to 25mg BID. In sinus rhythm.   - Continue telemetry  - Restart PTA metoprolol at 12.5mg BID    # Small bilateral pleural effusions   No problems breathing, however blunting of costophrenic ancle on CXR and small bilateral pulm effusions on CT. Likely etiology from MARTÍN and HFpEF  - Diuresis as above     # CAD s/p CABD x5  - Restart aspirin     # Blurry vision  Patient with lens implants so with fluid shifts, this could be why she is having blurry vision.   - If not improving with diuresis with consider head imaging and opthalmology.     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 Last HgbA1c 7.3, which is good control for her age and comorbidities  - Monitor  - Continue gabapentin for neuropathy    # Secondary hyperparathyroidism  # Vitamin D deficiency  , phos 6.1, Ca ionized 3.9, vit D 15  - Phoslo  - cholecalciferol 2,000 units qday    # Chronic normocytic Anemia  Hgb 7.5 - 8 while here. Baseline 8-9 most recently. Does not appear overly iron deficient based on last iron panel. May have some degree of anemia from CKD  - Continue iron supplements  - F/u with nephrology    # Abdominal pain and diarrhea- per patient is chronic, with some blood from hemmheroids. CT shows mesenteric haziness- edema vs enteritis- however WBC and lactate are wnl, so unlikely due to ischemic or infectious process. Although there is a decrease in hgb, this is due to a dilutional effect, as evidenced with the rest of the CBC.   - Montior and treat  symptoms as needed    # Gluteal cyst  Incidental finding, most likely cyst but if there is a concern for infection and source is unclear, can revisit this as a source. General surgery evaluated patient and no indication for I&D  - Monitor     CODE: FULL  DVT: SCD  FEN: Renal diet  Disposition: Pending medical optimization with regard to acidemia, MARTÍN, and infection.      Patient staffed with Dr. Lorenzo who agrees with above plan    Lake Pierre MD  [phone]  Pager: [phone]      Physical Exam (Student)  /75 (BP Location: Right arm)  Pulse 76  Temp 97.8  F (36.6  C) (Oral)  Resp 16  Wt 86.1 kg (189 lb 12.8 oz)  SpO2 95%  BMI 32.58 kg/m2    Constitutional: Appears well/peaceful, AOx3, lying in bed, not in distress  HEENT: EOMI, reports double vision (sees two people instead of one)  Respiratory: Good airflow, crackles lower right lobe  Cardiovascular: normal S1/S2, RRR, asymptomatic systolic ejection murmer grade 2 w/ possible radiation to carotids  Skin/Integumen: Multiple bilateral lower extremity ulcers not on feet.  A couple were bleeding a bit.  There are some pustules bilaterally along with diffuse erythema consistent with cellulitis.  Ulcers have whitish appearance, some pus.    Data Interpretation  I have reviewed today's vital signs, medications, labs and imaging which are significant for MARTÍN on CKD, metabolic acidosis responsive to bicard and NS, in the setting of suspected cellulitis and possible UTI/pyelonephritis.   Physical Exam:  Temp: 97.8  F (36.6  C) Temp src: Oral BP: 168/75 Pulse: 76 Heart Rate: 75 Resp: 16 SpO2: 95 % O2 Device: None (Room air)      Intake/Output Summary (Last 24 hours) at 08/15/17 1422  Last data filed at 08/15/17 1018   Gross per 24 hour   Intake             1850 ml   Output             3400 ml   Net            -1550 ml         General: alert and no distress, conversant and pleasant.   Head: NC/AT  Eyes: non-icteric sclera, EOMI  Pulmonary: Fine crackles at base of right  lung, otherwise good air movement and clear to auscultation.   CV: RRR, systolic ejection murmur 2/6. RRR.   GI: soft, non-tender, non-distended + bowel sounds  Skin: no rashes seen on exposed skin. Her shins and legs were covered in dressings.   MSK: no deformity.   EXT: Some edema of lower extremities.  Neuro: A&Ox3, grossly intact, no focal deficits   MEDICAL STUDENT SIGNATURE: MAGDIEL Ventura       Medications        ceFEPIme (MAIXPIME) IV  500 mg Intravenous Q24H     furosemide  60 mg Intravenous Once     sodium bicarbonate  1,300 mg Oral TID     vancomycin place maki - receiving intermittent dosing  1 each Does not apply See Admin Instructions     cholecalciferol  2,000 Units Oral Daily     ferrous sulfate  325 mg Oral Daily     calcium acetate  667 mg Oral TID w/meals     gabapentin  300 mg Oral At Bedtime       Data     Recent Labs  Lab 08/15/17  0436 08/14/17  0817 08/13/17  0552 08/13/17  0210  08/12/17  1616 08/12/17  1013   WBC 7.6 7.7 7.0  --   --  7.3 9.4   HGB 7.4* 7.6* 7.3*  --   --  7.8* 8.2*   MCV 82 81 80  --   --  80 83    220 216  --   --  248 286   INR  --  1.36*  --  2.35*  --  5.47*  --    * 147* 148*  --   < > 149* 146*   POTASSIUM 4.4 4.9 5.1  --   < > 5.2 5.6*   CHLORIDE 119* 119* 121*  --   < > 125* 124*   CO2 18* 16* 18*  --   < > 14* 13*   BUN 45* 48* 44*  --   < > 41* 42*   CR 5.51* 5.49* 5.29*  --   < > 5.07* 5.18*   ANIONGAP 11 13 9  --   < > 10 9   MALICK 6.7* 6.5* 6.4*  --   < > 6.4* 6.4*   * 96 120*  --   < > 125* 136*   ALBUMIN  --   --   --   --   --  1.9* 2.1*   PROTTOTAL  --   --   --   --   --  4.7* 5.0*   BILITOTAL  --   --   --   --   --  0.2 0.2   ALKPHOS  --   --   --   --   --  216* 228*   ALT  --   --   --   --   --  44 49   AST  --   --   --   --   --  32 45   < > = values in this interval not displayed.    Recent Results (from the past 24 hour(s))   US Abdominal Doppler Complete    Narrative    EXAMINATION: US ABDOMEN OR PELVIS DOPPLER COMPLETE,  8/14/2017 5:20 PM     COMPARISON: Renal ultrasound from 8/13/2017    HISTORY: Assess renal vascularity    Findings/    Impression    impression:  Nondiagnostic study. The kidneys and renal vessels are not visualized  due to soft tissue swelling and bowel gas.      I have personally reviewed the examination and initial interpretation  and I agree with the findings.    NEVAEH ARELLANO MD   US LACI Doppler No Excers Portable    Narrative    Exam: Bilateral lower extremity resting ankle brachial indices dated  8/15/2017 10:51 AM    Comparison study: CT 8/12/2017    Clinical history: Chronic venous stasis with infected wounds, history  of coronary artery disease with diabetes. Concern for peripheral  arterial disease.    Ordering provider: Ricco Chung    Technique: Bilateral lower extremity resting ankle brachial indices  obtained.    Findings:    Right:      Arm: 190 mmHg   PT at ankle: Noncompressible   DP at foot: 227 mmHg   LACI: 1.19   TBI: 0.97    Toe PPG: Normal    Left:     Arm: 180 mmHg   PT at ankle: 214 mmHg   DP at foot: 217 mmHg   LACI: 1.14   TBI: 0.64    Toe PPG: Normal      Impression    Impression:     Right leg: Resting LACI is 1.19 based on the dorsalis pedis  measurement, which is normal. It should be noted however that the  posterior tibial artery is noncompressible suggesting arterial  calcification. In this setting, LACI measurement in the other  interrogated vessels may be falsely elevated. Normal TBI and digit PPG  waveforms suggest the absence of hemodynamically relevant peripheral  arterial disease.    Left leg: Resting LACI is 1.14,. As discussed above, the presence of a  noncompressible right posterior tibial artery suggests arterial  calcification, which is usually present in other arterial  distributions and can falsely elevate LACI determination in those  vessels. The first digit TBI is abnormal at 0.64. The discrepancy  between the normal LACI and the abnormal TBI can be seen in the  setting  of small vessel pedal disease and/or microvascular disease. More  proximal disease cannot be excluded in this case, as LACI determination  may not be reliable in this patient with calcified vessels. Further  evaluation with arterial duplex may be helpful.    Guidelines:    LACI Diagnostic Criteria (Based on criteria published in Circulation  2011; 124: 5572-7237):    > 1.4: Non compressible    1.00 - 1.40: Normal    0.91 - 0.99: Borderline    At or below 0.90: Abnormal    LACI Diagnostic Criteria (Based on ACC/AHA guideline 2008):    >/=1.3 - non compressible vessels    1.00  -1.29 - Normal    0.91 - 0.99 - Borderline    0.41 - 0.90 - Mild to moderate PAD    0.00 - 0.40 - Severe PAD    I have personally reviewed the examination and initial interpretation  and I agree with the findings.    QIAN LACEY MD

## 2017-08-15 NOTE — PROGRESS NOTES
" 08/15/17 1415   Quick Adds   Type of Visit Initial Occupational Therapy Evaluation   Living Environment   Lives With child(zoe), adult;spouse   Living Arrangements house   Home Accessibility grab bars present (bathtub)  (walk-in shower with built-in bench)   Number of Stairs to Enter Home 0   Number of Stairs Within Home 0  (stairs to loft that pt does not use)   Stair Railings at Home none   Transportation Available family or friend will provide  (Grand daughter)   Living Environment Comment Pt reports  has Parkinson's and may not be able to assist with physical tasks for pt if needed but often helps around the house.  Pt repots \"we help eachother when we need it\".  Pt reports granddaughter is available at times but comes and goes.    Self-Care   Dominant Hand right   Usual Activity Tolerance moderate   Current Activity Tolerance fair   Regular Exercise no   Equipment Currently Used at Home walker, rolling;grab bar;wound care supplies;walker, standard;shower chair;dressing device   Activity/Exercise/Self-Care Comment Per PT eval: Pt reports no formal exercise and being able to ambulate household distances comfortably. Receives  RN 3x per week - assists with LE wrapping as well. Grand daughter also assists with LE wrapping and completes on days RN is not present.    Functional Level Prior   Ambulation 1-->assistive equipment   Transferring 1-->assistive equipment   Toileting 1-->assistive equipment   Bathing 1-->assistive equipment   Dressing 1-->assistive equipment   Eating 0-->independent   Communication 0-->understands/communicates without difficulty   Swallowing 0-->swallows foods/liquids without difficulty   Cognition 1 - attention or memory deficits   Fall history within last six months no   Which of the above functional risks had a recent onset or change? ambulation;transferring   Prior Functional Level Comment PLOF is Mod I with use of fww or 4ww, sock-aid/reacher, shower bench, RN completes med " "management   General Information   Onset of Illness/Injury or Date of Surgery - Date 08/12/17   Referring Physician Lake Pierre MD   Patient/Family Goals Statement Pt would like to return home to [facility]   Additional Occupational Profile Info/Pertinent History of Current Problem Per chart review: \"Kathryn Banks is a 77 year old female with a history of CAD s/p CABD x5, HFpEF (60-65%) CKD 4, anemia, Type 2 DM, AFib s/p ablation 6/2017, presents with acute on chronic kidney disease and metabolic acidosis in the setting of possible cellulitis vs UTI as source, now improved on day 2 of antibiotics. \"   Precautions/Limitations fall precautions   Cognitive Status Examination   Orientation person   Memory impaired   Executive Function Impulsive;Self awareness/monitoring impaired   Cognitive Comment Pt reports increased memory impairements recently and she has been confused to date and location while admitted.    Visual Perception   Occulomotor Normal occulomotor function in all planes. No trophias or phorias but pt reports double vision that improves with monocular occlusion.    Visual Perception Comments Hx of cataract surgery with permanent lense. Wears reading glasses   Range of Motion (ROM)   ROM Comment BUE WFL, BLE limited by edema    Strength   Strength Comments BUE grossly 4/5, generalized weakness/deconditioning noted   Transfer Skill: Bed to Chair/Chair to Bed   Level of [place]: Bed to Chair minimum assist (75% patients effort)   Transfer Skill: Sit to Stand   Level of [place]: Sit/Stand stand-by assist   Transfer Skill: Toilet Transfer   Level of [place]: Toilet minimum assist (75% patients effort)   Balance   Balance Quick Add Sitting balance: static;Sitting balance: dynamic;Standing balance: static;Standing balance: dynamic   Sitting Balance: Static good balance   Sitting Balance: Dynamic good balance   Standing Balance: Static fair balance   Standing Balance: Dynamic fair balance " "  Lower Body Dressing   Level of [place]: Dress Lower Body dependent (less than 25% patients effort)  (Pt uses sock-aid at baseline)   Toileting   Level of [place]: Toilet moderate assist (50% patients effort)   Instrumental Activities of Daily Living (IADL)   Previous Responsibilities housekeeping;meal prep;finances   Activities of Daily Living Analysis   Impairments Contributing to Impaired Activities of Daily Living balance impaired;cognition impaired;coordination impaired;strength decreased  (edema )   General Therapy Interventions   Planned Therapy Interventions ADL retraining;strengthening;balance training;IADL retraining   Clinical Impression   Criteria for Skilled Therapeutic Interventions Met yes, treatment indicated   OT Diagnosis Decreased I/ADL independence   Influenced by the following impairments balance impaired;cognition impaired;coordination impaired;strength decreased   Assessment of Occupational Performance 3-5 Performance Deficits   Identified Performance Deficits balance impaired;cognition impaired;coordination impaired;strength decreased affecting occupational perfirmance with bathing, dressing, functional transfers and home safety/awareness   Clinical Decision Making (Complexity) Low complexity   Therapy Frequency 5 times/wk   Predicted Duration of Therapy Intervention (days/wks) 2 weeks   Anticipated Equipment Needs at Discharge (none)   Anticipated Discharge Disposition Long Term Care Facility   Risks and Benefits of Treatment have been explained. Yes   Patient, Family & other staff in agreement with plan of care Yes   Clinical Impression Comments Education provided, pt in agreement.    [facility] [place] SL-PAC TM \"6 Clicks\"   2016, Trustees of [facility], under license to [employer].  All rights reserved.   6 Clicks Short Forms Daily Activity Inpatient Short Form   [facility] AM-PAC  \"6 Clicks\" Daily Activity Inpatient Short Form   1. Putting on and taking off " regular lower body clothing? 2 - A Lot   2. Bathing (including washing, rinsing, drying)? 2 - A Lot   3. Toileting, which includes using toilet, bedpan or urinal? 3 - A Little   4. Putting on and taking off regular upper body clothing? 3 - A Little   5. Taking care of personal grooming such as brushing teeth? 4 - None   6. Eating meals? 4 - None   Daily Activity Raw Score (Score out of 24.Lower scores equate to lower levels of function) 18

## 2017-08-15 NOTE — PROGRESS NOTES
Nephrology Progress Note  08/15/2017         Kathryn Banks is a 77 year old year old female  With hx of CKD stage 4 , due to chronic diabetes and CAD, presented with painful leg ulcer and cellulitis, MARTÍN on CKD, decreased UO and worsened metabolic acidosis (admission bicarb 13)  .    Assessment & Recommendations:      1. MARTÍN - Etiology felt to be ATN 2/2 infection, hypotension, CKD, Vanco. Pt has UTI with > 100 K Ecoli. BC have been neg. UA showed pro 100, Lge blood and Leuk esterace. She also had hypotension on 8/12 and 8/13 with SBP 80's/.    - No acute indication for dialysis. GFR is unchanged this admission.    - Has outpt appt scheduled next week with Dr Barfield   - Continue to monitor for recovery with daily chemistry labs   - Avoid nephrotoxins and hypotension   - Renal dose medications ( Vanc level is 24.4)     2. CKD3/4 - Etiology felt to be DM, HTN, previous MARTÍN events and possibly some component of CRS. Patient follows with Dr Barfield in [place].      3. Volume status - Hypervolemia with edema. No hypoxia, dyspnea. She is non oliguric and actually better than the 1100 cc recorded due to missing the collection device most of the time. -150/. Admission weight 83.3 kg. Weight today 86.1 kg   - Primary team giving one dose of Lasix 60 mg IV again today.    - Recommend resuming home regimen of Lasix 40 mg po qd   - Daily standing weights please   - Continue to monitor I/O     4. HTN - Uncontrolled. -150/ given holding PTA meds 2/2 hypotension earlier this admission. Holding PTA meds: Lopressor 25 mg bid, Amlodipine 10 mg qd, Lasix 40 mg qd.     - Agree with starting Lopressor 12.5 mg bid   - Avoid hypotension in MARTÍN. Would allow blood pressure to drift into the 140/ range      5. Electrolytes - Has been exhibiting mild hypernatremia this admission with Na levels as high as 149. She appears to have chronic hypernatremia attributed to volume depletion and her chronic diarrhea. Na is 148  today. K 4.4   - Encourage Po fluid intake     6. Acid base - Metabolic Acidosis due to chronic diarrhea/MARTÍN/CKD. At baseline on Bicarb 650 mg TID. Currently bicarb 18   - Increasing Bicarb to 1300 mg TID today     7. BMD - Ionized Ca 3.9, Phos 6.1   - Started on Phoslo 667 mg TID with meals on 8/13/17   - It is very difficult for patients to take binders in the hospital given timing of meds/food, etc. Would not change at this time. Can be reassessed as outpt.      8. Anemia - Hgb 7.4. Iron studies 8/13/17: Fe 20, Ferritin 188, Tsat 17%. On oral iron   - Patient is enrolled in anemia management services through Nephrology clinic     9. Antimicrobials - Maxipime, Vanco. Vanco level 24.4. WBC 7.6, Afebrile. BC neg. UC > 100 K Ecoli     Recommendations were communicated to primary team directly     Seen and discussed with Dr. Lux Rodas, NP   [phone]     Interval History :   Creat stable  She is non oliguric  No uremic symptoms     Review of Systems:      I reviewed the following systems:  GI: Reports good appetite. Was NPO for much of yesterday  Neuro:  no confusion  Constitutional:  no fever or chills  CV: denies dyspnea or CP. Has edema  : voiding w/o moreira, but missing the collection device    Physical Exam:   I/O last 3 completed shifts:  In: 1850 [P.O.:1400; I.V.:450]  Out: 3400 [Urine:800; Stool:2600]   /75 (BP Location: Right arm)  Pulse 76  Temp 97.8  F (36.6  C) (Oral)  Resp 16  Wt 86.1 kg (189 lb 12.8 oz)  SpO2 95%  BMI 32.58 kg/m2     GENERAL APPEARANCE: Comfortable, NAD  EYES:  no scleral icterus, pupils equal  PULM: lungs CTA. Breathing is non labored. Not on supplemental oxygen.   CV: RRR      -edema- multiple skin ulcers on both legs. Has bilateral edema  GI: soft, NT, non distended   : Voiding w/o moreira  NEURO:  Alert/oriented    Labs:   All labs reviewed by me  Electrolytes/Renal -   Recent Labs   Lab Test  08/15/17   0436  08/14/17   0817  08/13/17   0735   08/13/17   0552   NA  148*  147*   --   148*   POTASSIUM  4.4  4.9   --   5.1   CHLORIDE  119*  119*   --   121*   CO2  18*  16*   --   18*   BUN  45*  48*   --   44*   CR  5.51*  5.49*   --   5.29*   GLC  120*  96   --   120*   MALICK  6.7*  6.5*   --   6.4*   MAG   --   2.0  1.9  1.8  1.8   PHOS  6.1*  6.1*  6.4*  6.4*  6.4*       CBC -   Recent Labs   Lab Test  08/15/17   0436  08/14/17   0817  08/13/17   0552   WBC  7.6  7.7  7.0   HGB  7.4*  7.6*  7.3*   PLT  234  220  216       LFTs -   Recent Labs   Lab Test  08/12/17   1616  08/12/17   1013  07/17/17   1054   07/07/17   0616   ALKPHOS  216*  228*   --    --   197*   BILITOTAL  0.2  0.2   --    --   0.7   ALT  44  49   --    --   35   AST  32  45   --    --   18   PROTTOTAL  4.7*  5.0*   --    --   5.5*   ALBUMIN  1.9*  2.1*  2.5*   < >  2.7*    < > = values in this interval not displayed.       Iron Panel -   Recent Labs   Lab Test  08/13/17   0552  07/17/17   1054  06/21/17   0842   IRON  20*  23*  36   IRONSAT  17 16  17   GWEN  188  206  196       Current Medications:    ceFEPIme (MAIXPIME) IV  500 mg Intravenous Q24H     furosemide  60 mg Intravenous Once     sodium bicarbonate  1,300 mg Oral TID     metoprolol  12.5 mg Oral BID     aspirin EC  81 mg Oral Daily     vancomycin place maki - receiving intermittent dosing  1 each Does not apply See Admin Instructions     cholecalciferol  2,000 Units Oral Daily     ferrous sulfate  325 mg Oral Daily     calcium acetate  667 mg Oral TID w/meals     gabapentin  300 mg Oral At Bedtime        Elsa Rodas, CLAUDIA

## 2017-08-15 NOTE — PROGRESS NOTES
08/15/17 0905   Quick Adds   Type of Visit Initial PT Evaluation       Present no   Language english   Living Environment   Lives With child(zoe), adult;spouse  ( and grand daughter)   Living Arrangements house   Home Accessibility no concerns   Number of Stairs to Enter Home 0   Number of Stairs Within Home 0   Transportation Available car;family or friend will provide   Living Environment Comment Pt lives with  and grand daughter - reports both are able to assist as needed. Pt also reports currently has 2 friends staying with them as well and one is a  who cooks meals 2-3x per week.    Self-Care   Usual Activity Tolerance moderate   Current Activity Tolerance fair   Regular Exercise no   Equipment Currently Used at Home walker, rolling  (fww and 4ww)   Activity/Exercise/Self-Care Comment Pt reports no formal exercise and being able to ambulate household distances comfortably. Receives HH RN 3x per week - assists with LE wrapping as well. Grand daughter also assists with LE wrapping and completes on days RN is not present. Pt also reports getting out of chairs has been getting tougher, but still able to complete herself.,   Functional Level Prior   Ambulation 1-->assistive equipment   Transferring 1-->assistive equipment   Fall history within last six months no   Which of the above functional risks had a recent onset or change? ambulation;transferring   Prior Functional Level Comment PLOF is Mod I with use of fww or 4ww   General Information   Onset of Illness/Injury or Date of Surgery - Date 08/12/17   Referring Physician Lake Pierre MD   Patient/Family Goals Statement To return home   Pertinent History of Current Problem (include personal factors and/or comorbidities that impact the POC) 77 year old female with a history of CAD s/p CABD x5, HFpEF (60-65%) CKD 4, anemia, Type 2 DM, AFib s/p ablation 6/2017, presents with acute on chronic kidney disease and metabolic  acidosis in the setting of possible cellulitis vs UTI as source, now improved on day 2 of antibiotics   Precautions/Limitations fall precautions   General Observations Pt supine in bed upon arrival. Pleasant and agreeable to therapy session.    General Info Comments Activity Orders: Up with assist.    Cognitive Status Examination   Orientation orientation to person, place and time   Level of Consciousness alert   Follows Commands and Answers Questions 100% of the time   Personal Safety and Judgment intact   Memory intact   Pain Assessment   Patient Currently in Pain No   Posture    Posture Forward head position;Protracted shoulders   Range of Motion (ROM)   ROM Comment B LE WFL   Strength   Strength Comments Demonstrates at least 3/5 B LE strength with functional mobility and transfers   Bed Mobility   Bed Mobility Comments Supine>sitting EOB with Zara of 1 and HOB elevated   Transfer Skills   Transfer Comments Sit<>stands with ModA of 1 and fww   Gait   Gait Comments NT - d/t multiple providers entering room   Balance   Balance Comments Seated: good. Standing: fair - Zara of 1 and fww for UE support while standing.   Sensory Examination   Sensory Perception Comments Pt reports hx of bottom of feet tingling - this is her baseline   General Therapy Interventions   Planned Therapy Interventions balance training;bed mobility training;gait training;ROM;strengthening;stretching;transfer training;home program guidelines;progressive activity/exercise   Clinical Impression   Criteria for Skilled Therapeutic Intervention yes, treatment indicated   PT Diagnosis Decreased functional mobility   Influenced by the following impairments Decreased strength and activity tolerance, impaired balance   Functional limitations due to impairments Impaired ability to functionally navigate in home or community   Clinical Presentation Stable/Uncomplicated   Clinical Presentation Rationale [facility]   Clinical Decision Making (Complexity) Moderate  "complexity   Therapy Frequency` daily   Predicted Duration of Therapy Intervention (days/wks) 8/22/2017   Anticipated Equipment Needs at Discharge (pt has fww and 4ww at home)   Anticipated Discharge Disposition Transitional Care Facility;Home with Assist;Home with Home Therapy   Risk & Benefits of therapy have been explained Yes   Patient, Family & other staff in agreement with plan of care Yes   [facility] TM \"6 Clicks\"   2016, Trustees of [facility], under license to [employer].  All rights reserved.   6 Clicks Short Forms Basic Mobility Inpatient Short Form   [facility]  \"6 Clicks\" V.2 Basic Mobility Inpatient Short Form   1. Turning from your back to your side while in a flat bed without using bedrails? 3 - A Little   2. Moving from lying on your back to sitting on the side of a flat bed without using bedrails? 2 - A Lot   3. Moving to and from a bed to a chair (including a wheelchair)? 2 - A Lot   4. Standing up from a chair using your arms (e.g., wheelchair, or bedside chair)? 2 - A Lot   5. To walk in hospital room? 2 - A Lot   6. Climbing 3-5 steps with a railing? 2 - A Lot   Basic Mobility Raw Score (Score out of 24.Lower scores equate to lower levels of function) 13   Total Evaluation Time   Total Evaluation Time (Minutes) 10     "

## 2017-08-15 NOTE — CONSULTS
VASCULAR SURGERY CONSULTATION NOTE    HPI: Mrs. Banks is a 77- year female who was transferred to [facility] from an outside hospital on 8/14/2017 for evaluation and treatment of diarrhea and acidosis. Vascular Surgery was consulted for bilateral lower extremity ulcers of 1-3 months duration.     REFERRAL SOURCE: Dr. Pierre    PAST MEDICAL HISTORY:  Past Medical History:   Diagnosis Date     Carpal tunnel syndrome      Coronary atherosclerosis of native coronary artery 2006    5 vessel CABG     OBSTRUCTIVE SLEEP APNEA     using CPAP     Osteoarthrosis, unspecified whether generalized or localized, unspecified site     generalized arthritis, particularly in her hands and feet         Other and combined forms of senile cataract 2000    Bilateral     Other and unspecified hyperlipidemia      RESTLESS LEGS SYNDROME      TENOSYNOV HAND/WRIST NEC 6/30/2006     Type II or unspecified type diabetes mellitus without mention of complication, not stated as uncontrolled 2001    Diabetes mellitus/pt is diet controlled with weight loss     Typical atrial flutter (H) 01/17/2007    ablation 6/7/2017     Unspecified essential hypertension        PAST SURGICAL HISTORY:  Past Surgical History:   Procedure Laterality Date     ANGIOPLASTY       C APPENDECTOMY       C CABG, VEIN, FIVE  12/06    SVG x 4 and LIMA to LAD     C SOTO W/O FACETEC FORAMOT/DSKC 1/2 VRT SEG, LUMBAR  1968     C REMV CATARACT INTRACAP,INSERT LENS      Bilateral     C TOTAL ABDOM HYSTERECTOMY  1995     - fibroids     C VAGOTOMY/PYLOROPLASTY,SELECT  1970     COLONOSCOPY  12/3/2007     COLONOSCOPY  1/17/2014    Procedure: COLONOSCOPY;  Colonoscopy;  Surgeon: Zack Stearns MD;  Location:  GI     CYSTOSCOPY  11/25/09    [facility]     ENDOSCOPY  03/21/2000    Upper GI     HC COLONOSCOPY THRU STOMA, DIAGNOSTIC  10/7/04    poor prep, repeat in 2-3 years     HC COLONOSCOPY W/WO BRUSH/WASH  10/31/07    Repeat-poor quality prep     HC REMOVAL OF OVARY/TUBE(S)       Salpingo-Oophorectomy, Unilateral     HC REVISE MEDIAN N/CARPAL TUNNEL SURG      Carpal tunnel release     HC UGI ENDOSCOPY DIAG W BIOPSY  10/31/07     HC UGI ENDOSCOPY, SIMPLE EXAM  12/3/2007     OPEN REDUCTION INTERNAL FIXATION HIP NAILING Left 7/3/2016    Procedure: OPEN REDUCTION INTERNAL FIXATION HIP NAILING;  Surgeon: Lake Schulz MD;  Location: [facility] OR       FAMILY HISTORY:  Family History   Problem Relation Age of Onset     DIABETES Father      AODM     Neurologic Disorder Father      Parkinson's     Blood Disease Father       from blood clot from leg to lung immediately after hip fracture     DIABETES Paternal Grandmother      adult onset     DIABETES Maternal Grandmother      adult onset     Hypertension No family hx of      CEREBROVASCULAR DISEASE No family hx of        SOCIAL HISTORY:   Social History   Substance Use Topics     Smoking status: Former Smoker     Years: 10.00     Quit date: 1969     Smokeless tobacco: Never Used     Alcohol use 0.0 oz/week     0 Standard drinks or equivalent per week      Comment: occasional- 2 drinks per month       TOBACCO USE:  Non-smoker, lifelong    FUNCTIONAL CAPACITY: Lives in her own home in [place] with her , granddaughter, and 2- friends. Her limiting factor with exercise is lightheadedness. She denies classic rest pain and claudication.     HOME MEDICATIONS:  Prior to Admission medications    Medication Sig Start Date End Date Taking? Authorizing Provider   traMADol (ULTRAM) 50 MG tablet Take 1-2 tablets ( mg) by mouth every 6 hours as needed for pain maximum 3 tablet(s) per day 17   Dheeraj Jj MD   metoprolol (LOPRESSOR) 50 MG tablet Take 0.5 tablets (25 mg) by mouth 2 times daily 8/10/17   Thaddeus King MD   aspirin 81 MG tablet Take 1 tablet (81 mg) by mouth daily 8/10/17   Thaddeus King MD   amLODIPine (NORVASC) 5 MG tablet Take 2 tablets (10 mg) by mouth daily 8/10/17   Thaddeus King MD   sodium bicarbonate 650 MG tablet  Take 1 tablet (650 mg) by mouth 3 times daily 7/28/17   Dheeraj Jj MD   calcium acetate (PHOSLO) 667 MG CAPS capsule Take 2 capsules (1,334 mg) by mouth 3 times daily (with meals) 7/11/17   Damian Contreras MD   furosemide (LASIX) 40 MG tablet Take 1 tablet (40 mg) by mouth daily 7/11/17   Damian Contreras MD   pravastatin (PRAVACHOL) 40 MG tablet Take 1 tablet (40 mg) by mouth daily 6/28/17   Dheeraj Jj MD   acetaminophen (TYLENOL) 325 MG tablet Take 2 tablets (650 mg) by mouth every 4 hours as needed for mild pain 6/15/17   Arash Silva MD   nitroglycerin (NITROSTAT) 0.4 MG sublingual tablet Place 1 tablet (0.4 mg) under the tongue every 5 minutes as needed for chest pain  Patient not taking: Reported on 8/10/2017 6/15/17   Arash Silva MD   gabapentin (NEURONTIN) 300 MG capsule Take 1 capsule (300 mg) by mouth At Bedtime  Patient taking differently: Take 300 mg by mouth 2 times daily  6/15/17   Arash Silva MD   pramipexole (MIRAPEX) 0.5 MG tablet Take 3 tablets (1.5 mg) by mouth every evening 6/15/17   Arash Silva MD   ferrous sulfate (IRON) 325 (65 FE) MG tablet Take 1 tablet (325 mg) by mouth daily (with breakfast) 6/15/17   Arash Silva MD   calcium carbonate-vitamin D 500-400 MG-UNIT TABS per tablet Take 1 tablet by mouth daily 6/15/17   Arash Silva MD   cyanocobalamin (VITAMIN B12) 1000 MCG/ML injection Inject 1 mL (1,000 mcg) into the muscle every 30 days 3/31/17 3/31/18  Dheeraj Jj MD   ORDER FOR DME Equipment being ordered: cpap machine, mask, humidifier, tubing and filters 3/4/14   Dheeraj Jj MD       VITAL SIGNS:  Temp: 97.8  F (36.6  C) Temp src: Oral BP: 168/75 Pulse: 76 Heart Rate: 75 Resp: 16 SpO2: 95 % O2 Device: None (Room air)      189 lbs 12.8 oz    PHYSICAL EXAM:  NEURO/PSYCH:  The patient is alert and oriented.  Appropriate.  Moves all extremities.   SKIN: Color is appropriate for race, warm, dry.  PULMONARY:  Does not require supplemental  oxygen; no acute distress.  EXTREMITIES: Generalized lower extremity edema; appears chronic. Bilateral lower legs with scattered ulcers; venous stasis skin changes present. Non-malodorous, ulcerations are non-fluctuant, and do not appear grossly infected. No ulcers present on feet or toes. Palpable left dorsalis pedis, no palpable right distal pulses. No palpable bilateral femoral pulses. Extremities are warm and appear well-perfused.     8/15/2017 LACI RESULTS:  Right:       Arm: 190 mmHg   PT at ankle: Noncompressible   DP at foot: 227 mmHg   LACI: 1.19   TBI: 0.97     Toe PPG: Normal     Left:      Arm: 180 mmHg   PT at ankle: 214 mmHg   DP at foot: 217 mmHg   LACI: 1.14   TBI: 0.64     Toe PPG: Normal    ASSESSMENT/PLAN:  #1 Ulcers, bilateral lower legs, likely secondary to venous stasis, diabetes mellitus, and atherosclerosis   #2 Diabetes mellitus, unknown medication or control  #3 Status post coronary artery bypass grafting  #4 Chronic kidney disease, likely stage V, not on dialysis   - toe pressures will help us further categorize the wounds  - does not appear to be straight forward peripheral artery disease  - her renal function would only allow for CO studies, no contrast  - at this time she is not acutely threatened; her main worry is amputation, which I do not think will be Dr. Cardenas's recommendation at this time  - acute threats include acute arterial occlusion and life-threatening infection, which I do not suspect at this time  - Dr. Cardenas will see later today or tomorrow  - wound care as previously ordered     ANTI-PLATELET: Aspirin  ANTI-COAGULANT: Not warranted  STATIN: On pravastatin; need to consider high-dose statin therapy  DIABETES MEDICATIONS: Unknown  TOBACCO CESSATION: Non-smoker    Please contact Dr. Cardenas's Vascular Surgery Service with questions or concerns.    Emy Armas, RHODA, CNP  Division of Vascular Surgery  [facility]  Pager: [phone]

## 2017-08-15 NOTE — PLAN OF CARE
Problem: Goal Outcome Summary  Goal: Goal Outcome Summary  PT 6B: Evaluation completed and treatment initiated. Pt completes bed mobility with Zara of 1. Sit<>stands with ModA of 1 and fww. Pivot transfer with Min-ModA of 1 and fww. Limited d/t dizziness initially with sitting up, but resolves with increased time spent sitting EOB. Session limited d/t transport arriving for ultra sound and pt with other providers when re-attempted in PM.      Discharge recommendations pending further OOB mobility assessment.

## 2017-08-16 ENCOUNTER — APPOINTMENT (OUTPATIENT)
Dept: PHYSICAL THERAPY | Facility: CLINIC | Age: 77
DRG: 291 | End: 2017-08-16
Attending: INTERNAL MEDICINE
Payer: MEDICARE

## 2017-08-16 ENCOUNTER — APPOINTMENT (OUTPATIENT)
Dept: ULTRASOUND IMAGING | Facility: CLINIC | Age: 77
DRG: 291 | End: 2017-08-16
Attending: NURSE PRACTITIONER
Payer: MEDICARE

## 2017-08-16 LAB
ALBUMIN SERPL-MCNC: 1.6 G/DL (ref 3.4–5)
ANION GAP SERPL CALCULATED.3IONS-SCNC: 11 MMOL/L (ref 3–14)
BASE DEFICIT BLDV-SCNC: 6.4 MMOL/L
BUN SERPL-MCNC: 42 MG/DL (ref 7–30)
CA-I BLD-MCNC: 3.8 MG/DL (ref 4.4–5.2)
CA-I BLD-MCNC: 4 MG/DL (ref 4.4–5.2)
CALCIUM SERPL-MCNC: 6.4 MG/DL (ref 8.5–10.1)
CHLORIDE SERPL-SCNC: 118 MMOL/L (ref 94–109)
CO2 SERPL-SCNC: 18 MMOL/L (ref 20–32)
CREAT SERPL-MCNC: 5.42 MG/DL (ref 0.52–1.04)
ERYTHROCYTE [DISTWIDTH] IN BLOOD BY AUTOMATED COUNT: 22.4 % (ref 10–15)
GFR SERPL CREATININE-BSD FRML MDRD: 8 ML/MIN/1.7M2
GLUCOSE BLDC GLUCOMTR-MCNC: 109 MG/DL (ref 70–99)
GLUCOSE BLDC GLUCOMTR-MCNC: 142 MG/DL (ref 70–99)
GLUCOSE BLDC GLUCOMTR-MCNC: 216 MG/DL (ref 70–99)
GLUCOSE BLDC GLUCOMTR-MCNC: 244 MG/DL (ref 70–99)
GLUCOSE SERPL-MCNC: 114 MG/DL (ref 70–99)
HCO3 BLDV-SCNC: 20 MMOL/L (ref 21–28)
HCT VFR BLD AUTO: 24.4 % (ref 35–47)
HGB BLD-MCNC: 7.4 G/DL (ref 11.7–15.7)
MAGNESIUM SERPL-MCNC: 1.8 MG/DL (ref 1.6–2.3)
MCH RBC QN AUTO: 24.6 PG (ref 26.5–33)
MCHC RBC AUTO-ENTMCNC: 30.3 G/DL (ref 31.5–36.5)
MCV RBC AUTO: 81 FL (ref 78–100)
O2/TOTAL GAS SETTING VFR VENT: ABNORMAL %
PCO2 BLDV: 42 MM HG (ref 40–50)
PH BLDV: 7.28 PH (ref 7.32–7.43)
PHOSPHATE SERPL-MCNC: 5.7 MG/DL (ref 2.5–4.5)
PLATELET # BLD AUTO: 207 10E9/L (ref 150–450)
PO2 BLDV: 69 MM HG (ref 25–47)
POTASSIUM SERPL-SCNC: 3.8 MMOL/L (ref 3.4–5.3)
RBC # BLD AUTO: 3.01 10E12/L (ref 3.8–5.2)
SODIUM SERPL-SCNC: 147 MMOL/L (ref 133–144)
VANCOMYCIN SERPL-MCNC: 29.8 MG/L
WBC # BLD AUTO: 7.4 10E9/L (ref 4–11)

## 2017-08-16 PROCEDURE — 82803 BLOOD GASES ANY COMBINATION: CPT | Performed by: INTERNAL MEDICINE

## 2017-08-16 PROCEDURE — 97530 THERAPEUTIC ACTIVITIES: CPT | Mod: GP | Performed by: PHYSICAL THERAPIST

## 2017-08-16 PROCEDURE — 25000132 ZZH RX MED GY IP 250 OP 250 PS 637: Mod: GY | Performed by: STUDENT IN AN ORGANIZED HEALTH CARE EDUCATION/TRAINING PROGRAM

## 2017-08-16 PROCEDURE — 00000146 ZZHCL STATISTIC GLUCOSE BY METER IP

## 2017-08-16 PROCEDURE — 25000128 H RX IP 250 OP 636: Performed by: INTERNAL MEDICINE

## 2017-08-16 PROCEDURE — 82330 ASSAY OF CALCIUM: CPT | Performed by: STUDENT IN AN ORGANIZED HEALTH CARE EDUCATION/TRAINING PROGRAM

## 2017-08-16 PROCEDURE — 40000802 ZZH SITE CHECK

## 2017-08-16 PROCEDURE — 97116 GAIT TRAINING THERAPY: CPT | Mod: GP | Performed by: PHYSICAL THERAPIST

## 2017-08-16 PROCEDURE — A9270 NON-COVERED ITEM OR SERVICE: HCPCS | Mod: GY | Performed by: STUDENT IN AN ORGANIZED HEALTH CARE EDUCATION/TRAINING PROGRAM

## 2017-08-16 PROCEDURE — A9270 NON-COVERED ITEM OR SERVICE: HCPCS | Mod: GY

## 2017-08-16 PROCEDURE — 12000001 ZZH R&B MED SURG/OB UMMC

## 2017-08-16 PROCEDURE — 40000193 ZZH STATISTIC PT WARD VISIT: Performed by: PHYSICAL THERAPIST

## 2017-08-16 PROCEDURE — 99233 SBSQ HOSP IP/OBS HIGH 50: CPT | Mod: GC | Performed by: INTERNAL MEDICINE

## 2017-08-16 PROCEDURE — 93922 UPR/L XTREMITY ART 2 LEVELS: CPT

## 2017-08-16 PROCEDURE — 25000132 ZZH RX MED GY IP 250 OP 250 PS 637: Mod: GY | Performed by: INTERNAL MEDICINE

## 2017-08-16 PROCEDURE — 80069 RENAL FUNCTION PANEL: CPT | Performed by: INTERNAL MEDICINE

## 2017-08-16 PROCEDURE — 36415 COLL VENOUS BLD VENIPUNCTURE: CPT | Performed by: INTERNAL MEDICINE

## 2017-08-16 PROCEDURE — 85027 COMPLETE CBC AUTOMATED: CPT | Performed by: INTERNAL MEDICINE

## 2017-08-16 PROCEDURE — 83735 ASSAY OF MAGNESIUM: CPT | Performed by: INTERNAL MEDICINE

## 2017-08-16 PROCEDURE — 40000141 ZZH STATISTIC PERIPHERAL IV START W/O US GUIDANCE

## 2017-08-16 PROCEDURE — 80202 ASSAY OF VANCOMYCIN: CPT | Performed by: INTERNAL MEDICINE

## 2017-08-16 PROCEDURE — 25000132 ZZH RX MED GY IP 250 OP 250 PS 637: Mod: GY

## 2017-08-16 PROCEDURE — A9270 NON-COVERED ITEM OR SERVICE: HCPCS | Mod: GY | Performed by: INTERNAL MEDICINE

## 2017-08-16 PROCEDURE — 82330 ASSAY OF CALCIUM: CPT | Performed by: INTERNAL MEDICINE

## 2017-08-16 RX ORDER — AMPICILLIN 1 G/1
1 INJECTION, POWDER, FOR SOLUTION INTRAMUSCULAR; INTRAVENOUS EVERY 8 HOURS
Status: DISCONTINUED | OUTPATIENT
Start: 2017-08-16 | End: 2017-08-17

## 2017-08-16 RX ORDER — AMLODIPINE BESYLATE 5 MG/1
5 TABLET ORAL DAILY
Status: DISCONTINUED | OUTPATIENT
Start: 2017-08-16 | End: 2017-08-16

## 2017-08-16 RX ORDER — LANOLIN ALCOHOL/MO/W.PET/CERES
3 CREAM (GRAM) TOPICAL
Status: DISCONTINUED | OUTPATIENT
Start: 2017-08-16 | End: 2017-08-18 | Stop reason: HOSPADM

## 2017-08-16 RX ORDER — HYDRALAZINE HYDROCHLORIDE 20 MG/ML
10 INJECTION INTRAMUSCULAR; INTRAVENOUS EVERY 4 HOURS PRN
Status: DISCONTINUED | OUTPATIENT
Start: 2017-08-16 | End: 2017-08-18 | Stop reason: HOSPADM

## 2017-08-16 RX ORDER — HYDRALAZINE HYDROCHLORIDE 10 MG/1
10 TABLET, FILM COATED ORAL ONCE
Status: COMPLETED | OUTPATIENT
Start: 2017-08-16 | End: 2017-08-16

## 2017-08-16 RX ORDER — PENTOXIFYLLINE 400 MG/1
400 TABLET, EXTENDED RELEASE ORAL DAILY
Status: DISCONTINUED | OUTPATIENT
Start: 2017-08-17 | End: 2017-08-18 | Stop reason: HOSPADM

## 2017-08-16 RX ORDER — PENTOXIFYLLINE 400 MG/1
400 TABLET, EXTENDED RELEASE ORAL
Status: DISCONTINUED | OUTPATIENT
Start: 2017-08-16 | End: 2017-08-16

## 2017-08-16 RX ORDER — AMLODIPINE BESYLATE 10 MG/1
10 TABLET ORAL DAILY
Status: DISCONTINUED | OUTPATIENT
Start: 2017-08-17 | End: 2017-08-18 | Stop reason: HOSPADM

## 2017-08-16 RX ORDER — AMLODIPINE BESYLATE 5 MG/1
5 TABLET ORAL ONCE
Status: COMPLETED | OUTPATIENT
Start: 2017-08-16 | End: 2017-08-16

## 2017-08-16 RX ORDER — METOPROLOL TARTRATE 25 MG/1
25 TABLET, FILM COATED ORAL 2 TIMES DAILY
Status: DISCONTINUED | OUTPATIENT
Start: 2017-08-16 | End: 2017-08-18 | Stop reason: HOSPADM

## 2017-08-16 RX ADMIN — GABAPENTIN 300 MG: 300 CAPSULE ORAL at 22:16

## 2017-08-16 RX ADMIN — AMLODIPINE BESYLATE 5 MG: 5 TABLET ORAL at 08:37

## 2017-08-16 RX ADMIN — VITAMIN D, TAB 1000IU (100/BT) 2000 UNITS: 25 TAB at 08:28

## 2017-08-16 RX ADMIN — CALCIUM ACETATE 667 MG: 667 CAPSULE ORAL at 08:28

## 2017-08-16 RX ADMIN — SODIUM BICARBONATE 650 MG TABLET 1300 MG: at 08:28

## 2017-08-16 RX ADMIN — CALCIUM ACETATE 667 MG: 667 CAPSULE ORAL at 18:27

## 2017-08-16 RX ADMIN — FERROUS SULFATE TAB 325 MG (65 MG ELEMENTAL FE) 325 MG: 325 (65 FE) TAB at 08:28

## 2017-08-16 RX ADMIN — ASPIRIN 81 MG: 81 TABLET, COATED ORAL at 08:28

## 2017-08-16 RX ADMIN — SODIUM BICARBONATE 650 MG TABLET 1300 MG: at 14:39

## 2017-08-16 RX ADMIN — CEFEPIME 500 MG: 2 INJECTION, POWDER, FOR SOLUTION INTRAMUSCULAR; INTRAVENOUS at 08:29

## 2017-08-16 RX ADMIN — METOPROLOL TARTRATE 12.5 MG: 25 TABLET, FILM COATED ORAL at 08:28

## 2017-08-16 RX ADMIN — HYDRALAZINE HYDROCHLORIDE 10 MG: 10 TABLET, FILM COATED ORAL at 02:06

## 2017-08-16 RX ADMIN — SODIUM BICARBONATE 650 MG TABLET 1300 MG: at 20:22

## 2017-08-16 RX ADMIN — METOPROLOL TARTRATE 25 MG: 25 TABLET ORAL at 20:22

## 2017-08-16 RX ADMIN — CALCIUM 1250 MG: 500 TABLET ORAL at 08:28

## 2017-08-16 RX ADMIN — AMPICILLIN SODIUM 1 G: 1 INJECTION, POWDER, FOR SOLUTION INTRAMUSCULAR; INTRAVENOUS at 20:22

## 2017-08-16 RX ADMIN — CALCIUM ACETATE 667 MG: 667 CAPSULE ORAL at 11:59

## 2017-08-16 RX ADMIN — AMPICILLIN SODIUM 1 G: 1 INJECTION, POWDER, FOR SOLUTION INTRAMUSCULAR; INTRAVENOUS at 11:59

## 2017-08-16 RX ADMIN — AMLODIPINE BESYLATE 5 MG: 5 TABLET ORAL at 11:59

## 2017-08-16 NOTE — PLAN OF CARE
Problem: Goal Outcome Summary  Goal: Goal Outcome Summary  Outcome: No Change  BP (!) 166/94 (BP Location: Right arm)  Pulse 76  Temp 97.9  F (36.6  C) (Oral)  Resp 18  Wt 86.1 kg (189 lb 12.8 oz)  SpO2 100%  BMI 32.58 kg/m2     Cognitive: A&Ox4. Forgetful, makes repeated requests.  Cardiac: Sinus rhythm sustains 70-80's. -180's. PO Hydralazine given x2.  Resp: RA during day, Home bipap at night. Was occasionally desatting into 60's while on BIPAP, RT hooked up 4L bleed in oxygen.  GI/: Large, loose stool. Cee with adequate urine  Diet/Appetite: Renal diet with 1.5L FR.  Skin: Bilateral lower extremities wrapped.   Access: L PIV, saline locked.  Activity: up to the commode with one.  Pain: Denies pain at this time.     Will continue with plan of care and notify MD of any changes.

## 2017-08-16 NOTE — PROVIDER NOTIFICATION
-------------------CRITICAL LAB VALUE-------------------    Lab Value: Vanco- 29.8  Time of notification: 8:03 AM  MD notified: Lake Pierre   Patient status: Stable  Temp:  [97.9  F (36.6  C)-98.7  F (37.1  C)] 97.9  F (36.6  C)  Heart Rate:  [73-87] 73  Resp:  [16-20] 18  BP: (161-186)/() 166/94  FiO2 (%):  [4 %] 4 %  SpO2:  [96 %-100 %] 100 %  Orders received: Continue to monitor closely.

## 2017-08-16 NOTE — PLAN OF CARE
Problem: Goal Outcome Summary  Goal: Goal Outcome Summary  D:  Temp: 98.7  F (37.1  C) Temp src: Oral BP: (!) 179/91 Heart Rate: 73 Resp: 18 SpO2: 96 % O2 Device: None (Room air)  .  Patietn up in chair often.  Vitally stable.  Cee catheter inserted this evening.  Lasix given.  Blood pressure elevated, MD aware, Lasix will help bring down BP.  Will reassess BP at 8pm. She went down for ankle/brachial index doppler U/S today. Vascular surgery consulted and seeing patient this evening regarding abnormal U/S. Patient eating dinner and is comfortable. Fluid restriction started today 1500cc.  P:  Will continue with plan of care and close monitoring.

## 2017-08-16 NOTE — TELEPHONE ENCOUNTER
Prescription approved per [facility] Refill Protocol.  Christina Hernandez RN

## 2017-08-16 NOTE — PROGRESS NOTES
[facility], [place]    { :[id number]} Progress Note    Date of Service (when Attending saw the patient): 08/16/2017    Interval History   No events overnight except blood pressure rise up to 179/91 overnight given 10mg hydralazine PO.  BP opal this morning to 186/101; given another 10mg hydralazine, 10mg amlodipine and 12.5mg metoprolol.  Other patient denies headache, fever, n/v, abdominal pain but still has double vision causing unstable gait.  Will need further workup or consult w/ ophtho.    Assessment & Plan   Medical Student Note Resident Note   Assessment and Plan (Student)      Assessment:  Ms. Michael is a 78 y/o woman w/ a PMH of CKD4, A-Fib s/p ablation, CAD s/p CABG x5, HTN, and T2DM who presents from [facility] w/ MARTÍN on CKD, metabolic acidosis, suspected pseudomonal cellulitis and UTI currently being treated w/ IV antibiotics.    Plan:  #MARTÍN on CKDV:  CKD thought to be multifactorial and secondary to DM, HTN, renovascular disease, and previous MARTÍN. Renal US showed somewhat echogenic appearance, no hydronephrosis consistent with CKD ; doppler useless 2/2 anasarca.  Baseline Cr of 2.3-2.5; creatinine now stable ~5.5.  MARTÍN 2/2 HF exacerbation vs third spacing from sepsis in setting of possible renal vascular dx (i.e., stenosis).  Patient had good urine output y/d.  Now encouraging PO intake and home dose 40mg lasix PO.  BP now up to 179/103.  Urine output 3L today without diuresis.  -Renal following; appreciate recs  -Encourage PO today  -Hold home lasix dose  -Monitor I/Os more closely w/ moreira  -Daily weights  -Trend creatinine  -F/U opt w/ Dr. Barfield      #Bilateral Pseudomonal Cellulitis  Patient presented b/c worsening leg pain/erythema and increased # ulcers for the past month to the ED.  WBCs stable at 7.  Both legs have multiple ulcers of varying size (largest 3x2cm).  A couple ulcers were bleeding.  Both legs are diffusely erythematous and warm.  Pustular  lesions on both legs consistent w/ cellulitis.  Cultures back w/ cefepime pseudomonas.  Prior history of cellulitis.    -Derm consult RE calciphylaxis  eval  -wound care consult    -cont. Changing dressings  -elevate legs as much as possible  -Blood cultures NGTD  -Wound care consulted   -cont. zosyn (pip/tazo)   -Trend inflammatory/infection markers     #Lower extremity Arterial Insufficiency  On admission, bilateral legs/feet found to be cold with non-palpable pulses and high pain in the setting of prior CAD CABGx5 and recent A-fib ablation suggest possible arterial insufficiency.  LACI y/d showed vascular calcification which complicates LACI reading.  Per vasc sgx, does not appear to be straight forward PAD and no current acute threats.  -F/U w/ Dr. Wyman after d/c for TcPO2      #Possible UTI:  Patient reports dysuria, increased frequency for past 2 weeks.  Urine culture positive for Enterococcus pending Abx sensitivities  -Start linezolid as there is a possibility of VRE  -F/U sensitivities      #Metabolic acidosis, non-anion gap, improving: Likely 2/2 to uremia.  Baseline bicarbonate low at 21; bicarb currently 18 per BMP.   PH this AM was 7.28  -VBG Q 6 with goal pH>7.2  -Continue home Sodium Bicarbonate tabs 1300mg tid      #Electrolytes  Presented with hyperkalemia 5.6; now 3.8.  Mg2+ is normal. Ionized calcium still low at 3.8 and phosphorous elevated at 6.1.  PTH elevated ~700 3 weeks ago suggesting secondary hyperparathyroidism 2/2 to CKD. Mild hypernatremia now 2/2 to fluid restriction y/d.  Follow renal recs:  -encourage PO intake   -cont. Vit D 2000 units PO tab qd  -cont. phoslo (calcium acetate) TID 667mg w/ meals  -start Calcium gluconate 1g in D5W 100ml  -monitor BMP, iCa, Mg.     #Blurry vision  Blurry vision on/off for the past 2 months or so.  Currently worse during hospitalization.  Patient sees double.  Possible intracranial process like CVA.  Could be 2/2 to uremia although less likely given  that is goes away with one eye closed.  -head ct  -ophtho consult in hospital if possible; otherwise f/u opt.     #HFpEF: Echo on 8/13 showed normal LVEF 60-65% with moderately reduced RV function.  Elevated pulmonary artery pressure (in setting of volume overload).  We repeated echo in setting of volume overload to check heart function.  Hx of HF exacerbations 2/2 non-compliance w/ home diuresis.  Not clinically hypervolemic on examination, however BNP 46k y/d and CT w/ diffuse anasarca 2/2 MARTÍN.  Hypotensive on presentation to ED, but now w/ elevated BPs.  -monitor daily weights  -monitor I/O      #Supratherapeutic INR, improving: Patient last reports warfarin taken 8/11 despite being d/c in cardiology clinic 2 days prior.  S/p vitamin K.  INR was 5.47 y/d; now 1.36. Nephrology following if they decide to place tunnel dialysis cath.       #Incidental gluteal cyst vs abscess: Per CT, but not evident on examination.   - Monitor for now; if patient isn't improving w/ current abx regimen for cellulitis and uti, consider further evaluation of CT findings.  In the meantime surgery doesn't rec. I & D.     #Anemia 2/2 to Iron Deficiency & CKD  Hb is 7.3 down from 8.2 y/d.  Serum iron is low at 20.  MCV at 80.  Anemia could also be 2/2 to CKD, I.e., decreased kidney EPO production.  -start ferrous sulfate tab 325mg qd  -transfuse PRBC if <7; monitor for now      Cardiovascular:  #Hx of A-fib s/p ablation 6/2017: Follows w/ Dr. King of cardiology and seen last on 8/10.  In sinus rhythm, so warfarin d/c and metoprolol recently decreased 50mg->25mg  -monitor vitals  -hold metoprolol in setting of hypotension & bradycardia      #CAD s/p CABGx5  Resume asparin today after IR CVC Tunnel placement       T2DM, last A1C 7.3:  Not currently on any meds.  Blood sugars <150  -Monitor POCT glucoses      # Enteritis: Patient has 1 loose stool/day. Complains of suprapubic pain could be 2/2 UTI. CT with mesenteric haziness concerning for  enteritis but could be 2/2 to anasarca.  Diarrhea once/day for past two months possibly started after UTI treatment 6-8 weeks ago.  Blood in stool 2x/month.    - Monitor  - Abx as below      #Nutrition  Renal diet      Pain:  -Tylenol PRN  -Cont. Home tramadol; renal dose given visual hallucinations w/ other opioids/inability to take NSAID's  -Continue gabapentin           Disposition Plan: 4-5 days pending clinical improvement, tolerating dialysis, improving labs and evaluation/treatment of lower leg pain and UTI/pyelo.  Assessment and Plan (Resident)    Assessment:  Kathryn Bansk is a 77 year old female who was admitted on 8/12/2017. ***    Plan:  {Write a problem-based plan:***}    Disposition Plan: Expected discharge in *** days, once ***.      Physical Exam (Student)  BP (!) (P) 170/92 (BP Location: Right arm)  Pulse 76  Temp 98.6  F (37  C) (Oral)  Resp 18  Wt 86.1 kg (189 lb 12.8 oz)  SpO2 96%  BMI 32.58 kg/m2     Constitutional: Appears well/peaceful, AOx3, lying in bed, not in distress  HEENT: EOMI, reports double vision (sees two people instead of one)  Respiratory: Good airflow, crackles lower right lobe  Cardiovascular: normal S1/S2, RRR, asymptomatic systolic ejection murmer grade 2 w/ possible radiation to carotids  Skin/Integumen: Multiple bilateral lower extremity ulcers not on feet.  A couple were bleeding a bit.  There are some pustules bilaterally along with diffuse erythema consistent with cellulitis.  Ulcers have whitish appearance, some pus.    Data Interpretation  I have reviewed today's vital signs, medications, labs and imaging which are significant for MARTÍN on CKD, metabolic acidosis responsive to bicard and NS, in the setting of suspected cellulitis and possible UTI/pyelonephritis.    Physical Exam (Resident)  {Choose blank or pick list and edit:143995}    Data Interpretation  I have reviewed today's vital signs, medications, labs and imaging which are significant for: ***   I  "personally reviewed {Choose images/EKG's you personally looked at today:[id number]::\"no images or EKG's today\"}.    {MEDICAL STUDENT SIGNATURE: [place], MS3 {RESIDENT SIGNATURE:074907}     Medications        ampicillin  1 g Intravenous Q8H     [START ON 8/17/2017] amLODIPine  10 mg Oral Daily     metoprolol  25 mg Oral BID     ceFEPIme (MAIXPIME) IV  500 mg Intravenous Q24H     sodium bicarbonate  1,300 mg Oral TID     aspirin EC  81 mg Oral Daily     calcium carbonate  1,250 mg Oral Daily     cholecalciferol  2,000 Units Oral Daily     ferrous sulfate  325 mg Oral Daily     calcium acetate  667 mg Oral TID w/meals     gabapentin  300 mg Oral At Bedtime       Data     Recent Labs  Lab 08/16/17  0430 08/15/17  0436 08/14/17  0817  08/13/17  0210  08/12/17  1616 08/12/17  1013   WBC 7.4 7.6 7.7  < >  --   --  7.3 9.4   HGB 7.4* 7.4* 7.6*  < >  --   --  7.8* 8.2*   MCV 81 82 81  < >  --   --  80 83    234 220  < >  --   --  248 286   INR  --   --  1.36*  --  2.35*  --  5.47*  --    * 148* 147*  < >  --   < > 149* 146*   POTASSIUM 3.8 4.4 4.9  < >  --   < > 5.2 5.6*   CHLORIDE 118* 119* 119*  < >  --   < > 125* 124*   CO2 18* 18* 16*  < >  --   < > 14* 13*   BUN 42* 45* 48*  < >  --   < > 41* 42*   CR 5.42* 5.51* 5.49*  < >  --   < > 5.07* 5.18*   ANIONGAP 11 11 13  < >  --   < > 10 9   MALICK 6.4* 6.7* 6.5*  < >  --   < > 6.4* 6.4*   * 120* 96  < >  --   < > 125* 136*   ALBUMIN 1.6*  --   --   --   --   --  1.9* 2.1*   PROTTOTAL  --   --   --   --   --   --  4.7* 5.0*   BILITOTAL  --   --   --   --   --   --  0.2 0.2   ALKPHOS  --   --   --   --   --   --  216* 228*   ALT  --   --   --   --   --   --  44 49   AST  --   --   --   --   --   --  32 45   < > = values in this interval not displayed.    No results found for this or any previous visit (from the past 24 hour(s)).         "

## 2017-08-16 NOTE — PROGRESS NOTES
[facility]   Antimicrobial Management Team (AMT) Note            To: Jackson Leslye  Unit: 6B  Allergies:       Brief Summary:   Kathryn Banks is a 77 year old female admitted from OSH on 8/12 for cc of painful leg ulcer, cellulitis, worsened metabolic acidosis w/chronic diarrhea/nausea, decreased UO, and concern for urgent dialysis. PMH is significant for CAD, s/p CABG x5, HFpEF (echo on 5/31, EF: 60-65%), and h/o A-fib s/p ablation, chronic anemia, and CKD Stage 4-5 (CrCl: 9.5 mL/min, Cr 5.4 mg/dL) on 8/16) 2/2 DM, MARTÍN, and HTN.  HPI:  On arrival to the ED to [facility] on 8/12, patient was afebrile but noted to have AMS, lethargy, and mild hypotension. Labs were notable for anemia, hypernatremia, hyperkalemia, and hyperchloridemia in addition to elevated creatinine of 5.18. Lactate was WNL with elevated BNP. Venous gas demonstrated acidosis with low bicarbonate (10). Upon transfer to [facility] she had metabolic acidosis (bicarb 13) with blood pH 7.06; she was also oliguric. She was having intermittent visual hallucinations which improved by 8/13. She was given ceftriaxone x1 dose and vancomycin for potential UTI and cellulitis. Blood culture and MRSA PCR nares from 8/12 reported negative; urine culture reported E. coli and Citrobacter koseri. Wound culture reported Pseudomonas aeruginosa, Serratia marscens, Enterococcus faecalis, Coagulase negative Staphylococcus, and Klebsiella oxytoca. Urinalysis reported cloudy urine with high WBC (2568). On 8/13, patient reported some blurry vision, however, she has lens implants and change in vision was attributed to fluid shifts. She remained afebrile but periodically hypotensive, as well as oliguric without uremic symptoms; she denied n/v and neuro PE was negative for confusion. Repeat urine culture from 8/13 reported E. coli again and Enterococcus faecalis. Urinalysis again was cloudy with high WBC (4649). Ceftriaxone was switched to  Unasyn x1 dose,  and vancomycin was continued. Later in the day, Unasyn was switched to Zosyn. Given reported PCN allergy, no rashes were seen on exposed skin, granted her shins and legs were covered in dressings.  By 8/14, patient s mental status continued to be negative for confusion, she had been voiding w/o Cee catheter, and was sating well on RA. Possibility of HD initiation was noted, but patient had not been dialyzed to date and she has tolerated PO intake. On 8/15, she was examined for suspicious for venous insufficiency/venous ulcers with purulent cellulitis (see photos below); however, CT was negative for presence of deep tissue infection. Zosyn was switched to cefepime at this point.; Ampicillin was subsequently added this morning.    Assessment:  Possible sepsis 2/2 UTI vs. B/L LLE ulcers with cellulitis    Clinical findings are c/w UTI 2/2 polymicrobial etiology, including AMS at presentation, oliguria, lethargy, positive repeat urine cultures, and liane pyuria. However, urinary symptoms are complicated by Stage 4 CKD and chronic diarrhea. LLE ulcers have low suspicion for infectious cellulitis based on visual pictures from WOC notes and wound description. Despite positive polymicrobial cultures, the swab may represent colonization making it difficult to determine active pathogens contributing to cellulitis. Given severity of patient s complex clinical condition, ampicillin should be discontinued d/t low likelihood of Enterococcus being the primary  for the possible infections and cefepime continued for broad spectrum coverage of concerning cultured organisms, particularly E. coli and Citrobacter. Though a PCN allergy is noted, patient has tolerated piperacillin and ampicillin during this admission.    Recommendation/Interventions:   1). Discontinue ampicillin.  2). Continue cefepime 500 mg IV q24h (end: 8/20 to complete 8 day course).    Discussed w/ID Staff:  Mita Grossman, HazelD, Lamont  MD Ramila Rodriguez, PharmD Student    Current Antibiotic therapy:  Cefepime 500 mg IV q24h (8/15-  Ampicillin 1 g IV q8h (8/16-    Previous Antibiotic therapy:  Ceftriaxone 2 g IV q24h (8/12) x1 dose  Unasyn 3 g IV q24h (8/13) x1 dose  Zosyn 2.25 g IV q8h (8/13-15) x5 doses  Vancomycin 1250 mg IV q24h (8/12-14)    Culture Results:  Date Culture Site Organism   8/12 MRSA PCR - nares swab MRSA neg, SA neg   8/12 Urine cult, aerobic bacterial - midstream >100,000 colonies/mL Escherichia coli   50,000 to 100,000 colonies/mL Citrobacter koseri   Plus <10,000 colonies/mL mixed urogenital tyler. Final 8/15.   8/12 Wound cult, aerobic bacterial - rt lower leg Moderate growth Pseudomonas aeruginosa; Moderate growth Serratia marcescens; Moderate growth Enterococcus faecalis; Heavy growth Coagulase negative Staphylococcus; Light growth Klebsiella oxytoca, Susceptibility testing in progress         8/12 Wound cult, gram stain - rt lower leg Moderate Gram negative rods   Few Gram positive cocci   Rare WBC'S seen predominantly PMN's   8/12 BC x2  NGTD   8/13 Urine cult, aerobic bacterial - midstream >100,000 colonies/mL   Escherichia coli; 50,000 to 100,000 colonies/mL Enterococcus faecalis   8/15 C diff toxin B PCR - feces negative                         8/12 urine:    8/13 urine:        8/12 wound:      Vital Signs and other clinical features:      Temperature:  Last 7d:      Last 72h:    Imaging:

## 2017-08-16 NOTE — PROVIDER NOTIFICATION
Notified Jackson cross cover of SBP sustaining 170's. MD states she will put in order for Hydralazine.

## 2017-08-16 NOTE — PROGRESS NOTES
Nephrology Progress Note  08/16/2017       Kathryn Banks is a 77 year old year old female  With hx of CKD 4 , due to chronic diabetes and CAD, presented with painful leg ulcer and cellulitis, MARTÍN on CKD, decreased UO and worsened metabolic acidosis (admission bicarb 13). Baseline creat in the ~ 4.0 range  .    Assessment & Recommendations:       1. MARTÍN - Creat has been stable in the mid 5 range since admission. Etiology felt to be ATN 2/2 infection, hypotension, CKD, Vanco. Pt has UTI with > 100 K Ecoli. BC have been neg. UA showed pro 100, Lge blood and Leuk esterace. She also had hypotension on 8/12 and 8/13 with SBP 80's/. Baseline creat ~ 4.0   - No acute indication for dialysis. GFR is unchanged this admission.    - Has outpt appt scheduled 8/22/17 with Dr Barfield   - Continue to monitor for recovery with daily chemistry labs   - Avoid nephrotoxins and hypotension   - Renal dose medications ( Vanc level is 29.9!)      2. CKD3/4 - Etiology felt to be DM, HTN, previous MARTÍN events and possibly some component of CRS. Patient follows with Dr Barfield in [place].       3. Volume status - Hypervolemia with edema, but her edema is mostly third spaced. No hypoxia, dyspnea. Albumin is 1.6. She is non oliguric with UO 1900 cc last 24 hrs and 1950 cc already today. She had large stool output of 2600 cc last 24 hrs. -180/. Admission weight 83.3 kg. Weight 8/15 86.1 kg. No weight yet today   - Would hold further diuretics today    - Daily standing weights please   - Continue to monitor I/O      4. HTN - Uncontrolled. -180/. PTA meds have been gradually reintroduced after being held for hypotension earlier this admission. PTA antihypertensives include:  Lopressor 25 mg bid, Amlodipine 10 mg qd, Lasix 40 mg qd.     - Agree with increasing Lopressor to 25 mg bid   - Agree with resuming Amlodipine 10 mg qd   - Would hold Lasix for today and reassess tomorrow   - Avoid hypotension in MARTÍN. Would allow blood  pressure to drift into the 140/ range       5. Electrolytes - Has been exhibiting mild hypernatremia this admission with Na levels as high as 149. She appears to have chronic hypernatremia attributed to volume depletion and her chronic diarrhea. Na is 147 today. K 3.8   - Recommend liberalizing fluid intake      6. Acid base - Metabolic Acidosis due to chronic diarrhea/MARTÍN/CKD. At baseline on Bicarb 650 mg TID. Currently bicarb 18   - Continue Bicarb 1300 mg TID       7. BMD - Ionized Ca 4.0, Phos 5.7   - Started on Phoslo 667 mg TID with meals on 8/13/17   - It is very difficult for patients to take binders in the hospital given timing of meds/food, etc. Would not change at this time. Can be reassessed as outpt.       8. Anemia - Hgb 7.4. Iron studies 8/13/17: Fe 20, Ferritin 188, Tsat 17%. On oral iron   - Patient is enrolled in anemia management services through Nephrology clinic      9. Antimicrobials - Ampicillin, Maxipime, Vanco. Vanco level 29.9. WBC 7.4, Afebrile. BC neg. UC > 100 K Ecoli. Wound cx + pseudomonas.     10. Leg lesions - Primary team consulting Derm today to evaluate for calciphylaxis.     11. Nutrition - Tolerating diet, but appears significantly malnourished with albumin of 1.6.       Recommendations were communicated to primary team directly      Seen and discussed with Dr. Lux Rodas, NP   [phone]      Interval History :   Creat stable  She is non oliguric  Moreira inserted  C/o feeling thirsty, would like to drink more  Large stool output over last 24 hrs.      Review of Systems:       I reviewed the following systems:  GI: Reports good appetite.   Neuro:  no confusion  Constitutional:  no fever or chills  CV: denies dyspnea or CP. Has edema  : voiding w/o moreira, but missing the collection device    Physical Exam:   I/O last 3 completed shifts:  In: 1450 [P.O.:1350; I.V.:100]  Out: 8650 [Urine:3650; Stool:5000]   BP (!) 170/92 (BP Location: Right arm)  Pulse 76   Temp 97.5  F (36.4  C) (Oral)  Resp 18  Wt 86.1 kg (189 lb 12.8 oz)  SpO2 98%  BMI 32.58 kg/m2     GENERAL APPEARANCE: Comfortable, NAD  EYES:  no scleral icterus, pupils equal  PULM: lungs CTA. Breathing is non labored. Not on supplemental oxygen.   CV: RRR      -edema- multiple skin ulcers on both legs, edema has improved.   GI: soft, NT, non distended   : Cee present  NEURO:  Alert/oriented    Labs:   All labs reviewed by me  Electrolytes/Renal -   Recent Labs   Lab Test  08/16/17   0430  08/15/17   0436  08/14/17   0817  08/13/17   0735   NA  147*  148*  147*   --    POTASSIUM  3.8  4.4  4.9   --    CHLORIDE  118*  119*  119*   --    CO2  18*  18*  16*   --    BUN  42*  45*  48*   --    CR  5.42*  5.51*  5.49*   --    GLC  114*  120*  96   --    MALICK  6.4*  6.7*  6.5*   --    MAG  1.8   --   2.0  1.9   PHOS  5.7*  6.1*  6.1*  6.4*       CBC -   Recent Labs   Lab Test  08/16/17   0430  08/15/17   0436  08/14/17   0817   WBC  7.4  7.6  7.7   HGB  7.4*  7.4*  7.6*   PLT  207  234  220       LFTs -   Recent Labs   Lab Test  08/16/17   0430  08/12/17   1616  08/12/17   1013   07/07/17   0616   ALKPHOS   --   216*  228*   --   197*   BILITOTAL   --   0.2  0.2   --   0.7   ALT   --   44  49   --   35   AST   --   32  45   --   18   PROTTOTAL   --   4.7*  5.0*   --   5.5*   ALBUMIN  1.6*  1.9*  2.1*   < >  2.7*    < > = values in this interval not displayed.       Iron Panel -   Recent Labs   Lab Test  08/13/17   0552  07/17/17   1054  06/21/17   0842   IRON  20*  23*  36   IRONSAT  17 16 17   GWEN  188  206  196       Current Medications:    amLODIPine  5 mg Oral Daily     ampicillin  1 g Intravenous Q8H     ceFEPIme (MAIXPIME) IV  500 mg Intravenous Q24H     sodium bicarbonate  1,300 mg Oral TID     metoprolol  12.5 mg Oral BID     aspirin EC  81 mg Oral Daily     calcium carbonate  1,250 mg Oral Daily     cholecalciferol  2,000 Units Oral Daily     ferrous sulfate  325 mg Oral Daily     calcium acetate  667  mg Oral TID w/meals     gabapentin  300 mg Oral At Bedtime        Elsa Rodas, NP

## 2017-08-16 NOTE — PLAN OF CARE
Problem: Goal Outcome Summary  Goal: Goal Outcome Summary  PT 5A: Pt transfers supine>sitting with SBA, HOB elevated to 30 degrees. Increased burning/pain in LEs in dependent position, decreases with increased time sitting up. Sit<>stand with SBA. Pt ambulated 180 ft with FWW, CGA, wheelchair follow. Noted leg length discrepancy (R approximately 1/2 longer than L). Pt reports history of hip surgery, discrepancy more noticeable/limited with mobility with hospitalization.      Recommend: TCU upon discharge. If expecting long LOS at hospital, recommend orthotics consult to assess need for shoe lift.

## 2017-08-16 NOTE — CONSULTS
[facility] Inpatient Consult Dermatology Note    Impression/Plan:  1. Lipodermatosclerosis: The patient's history and examination are consistent with lipodermatosclerosis with ulceration, which represents late stage chronic venous stasis. The patient's ulcers appear shallow with fibrinous/granulation tissue at the base, suggestive of a venous origin. The lack of pain and purpura argue against calciphylaxis. Biopsy was considered to rule this out. However, given low suspicion and the likelihood of creating an additional poorly healing wound with biopsy, this was deferred. Rather, would advise treating with conservative measures including elevation and compression. Pentoxifylline is also helpful in lipodermatosclerosis, starting lower and titrating up to doses up to 800 mg TID.     Keep legs elevated above the heart as much as possible    Agree with gentle compression with ACE    Advise beginning pentoxifylline, starting at 400 mg TID. May titrate up as tolerated (primarily GI upset)    Ongoing treatment of wound infection per primary. Could consider topical treatment with gentian violet if coming off IV antibiotics  Please have patient follow up in dermatology clinic 4-6 weeks after discharge      Thank you for the dermatology consultation. Please do not hesitate to contact the dermatology resident/faculty on call for any additional questions or concerns. We will continue to follow.     Crow Solano MD  Dermatology resident, PGY-4  [phone]     I have seen and examined this patient and agree with the assessment and plan as documented in the resident's note.    Mike Pond MD  Dermatology Attending        Dermatology Problem List:  1. Lipodermatosclerosis    Date of Admission: Aug 12, 2017   Encounter Date: 8/16/2017     Reason for Consultation:   Non-healing ulcers, consider lipodermatosclerosis    History of Present Illness:  Ms. Kathryn Banks is a 77 year old female with a history of  chronic lower extremity edema, CAD s/p CABG, CHF, CKD, atrial fibrillation, and diabetes mellitus who was admitted 8/12/17 for acute kidney injury and volume overload with sepsis and suspected origin lower extremity ulcers vs UTI. Dermatology was consulted regarding concern for possible calciphylaxis as the origin of her lower extremity ulcers. The patient notes that these ulcers have been present for a long time and they are non-tender. She thinks they may be slowly healing, but is unsure. She is otherwise feeling well and has no other skin concerns at this time.     Past Medical History:   Patient Active Problem List   Diagnosis     Restless leg syndrome     Sleep apnea     Lipoma of other skin and subcutaneous tissue     ESOPHAGEAL REFLUX     Postsurgical aortocoronary bypass status     Iron deficiency anemia     History of peptic ulcer disease     Edema     Kidney stone     Hyperlipidemia LDL goal <100     Advanced directives, counseling/discussion     CAD - NSTEMI, CABG x 5 2007, angio in 2013 with patent grafts other than occluded graft to PL     Hx of non-ST elevation myocardial infarction (NSTEMI)     Health Care Home     Lymphedema     Anemia of chronic renal failure, stage 4 (severe) (H)     Renal failure     Osteoarthritis of hip     Non morbid obesity, unspecified obesity type     Type 2 diabetes mellitus with other circulatory complications (H)     Long-term (current) use of anticoagulants [Z79.01]     Acute renal failure with tubular necrosis (H)     Essential hypertension with goal blood pressure less than 140/90     Atrial fibrillation with rapid ventricular response (H)     Rash - redness medial thighs     Metabolic acidosis, normal anion gap (NAG)     Pulmonary hypertension (H)     Chronic heart failure (H) with preserved EF     Atrial flutter (H) - present 6/12     Generalized edema - anasarca     Hypertensive heart and chronic kidney disease with heart failure and stage 1 through stage 4 chronic  kidney disease, or unspecified chronic kidney disease (H)     Supratherapeutic INR     Proteinuria 2.1 g/g Cr     Bradycardia     Acute on chronic renal failure (H)     Memory loss     Chronic atrial fibrillation (H) on 6/12-6/13     Blood in stool     Microscopic hematuria     Acute kidney injury (nontraumatic) (H)     CHF (congestive heart failure) (H)     Anemia     Anemia, iron deficiency     Acidosis     Past Medical History:   Diagnosis Date     Carpal tunnel syndrome      Coronary atherosclerosis of native coronary artery 2006    5 vessel CABG     OBSTRUCTIVE SLEEP APNEA     using CPAP     Osteoarthrosis, unspecified whether generalized or localized, unspecified site     generalized arthritis, particularly in her hands and feet         Other and combined forms of senile cataract 2000    Bilateral     Other and unspecified hyperlipidemia      RESTLESS LEGS SYNDROME      TENOSYNOV HAND/WRIST NEC 6/30/2006     Type II or unspecified type diabetes mellitus without mention of complication, not stated as uncontrolled 2001    Diabetes mellitus/pt is diet controlled with weight loss     Typical atrial flutter (H) 01/17/2007    ablation 6/7/2017     Unspecified essential hypertension      Past Surgical History:   Procedure Laterality Date     ANGIOPLASTY       C APPENDECTOMY       C CABG, VEIN, FIVE  12/06    SVG x 4 and LIMA to LAD     C SOTO W/O FACETEC FORAMOT/DSKC 1/2 VRT SEG, LUMBAR  1968     C REMV CATARACT INTRACAP,INSERT LENS      Bilateral     C TOTAL ABDOM HYSTERECTOMY  1995     - fibroids     C VAGOTOMY/PYLOROPLASTY,SELECT  1970     COLONOSCOPY  12/3/2007     COLONOSCOPY  1/17/2014    Procedure: COLONOSCOPY;  Colonoscopy;  Surgeon: Zack Stearns MD;  Location: [facility]     CYSTOSCOPY  11/25/09    [facility]     ENDOSCOPY  03/21/2000    Upper GI     HC COLONOSCOPY THRU STOMA, DIAGNOSTIC  10/7/04    poor prep, repeat in 2-3 years     HC COLONOSCOPY W/WO BRUSH/WASH  10/31/07    Repeat-poor quality prep     HC  REMOVAL OF OVARY/TUBE(S)      Salpingo-Oophorectomy, Unilateral     HC REVISE MEDIAN N/CARPAL TUNNEL SURG      Carpal tunnel release     HC UGI ENDOSCOPY DIAG W BIOPSY  10/31/07     HC UGI ENDOSCOPY, SIMPLE EXAM  12/3/2007     OPEN REDUCTION INTERNAL FIXATION HIP NAILING Left 7/3/2016    Procedure: OPEN REDUCTION INTERNAL FIXATION HIP NAILING;  Surgeon: Lake Schulz MD;  Location: [facility]         Social History:  The patient does not work.     Family History:  There is no family history of melanoma.    Medications:  Current Facility-Administered Medications   Medication     hydrALAZINE (APRESOLINE) injection 10 mg     ampicillin (OMNIPEN) 1 g vial to attach to  ml bag for ADULTS or 50 ml bag for PEDS     [START ON 8/17/2017] amLODIPine (NORVASC) tablet 10 mg     metoprolol (LOPRESSOR) tablet 25 mg     ceFEPIme (MAXIPIME) 500 mg in NaCl 0.9 % 100 mL intermittent infusion     sodium bicarbonate tablet 1,300 mg     aspirin EC EC tablet 81 mg     calcium carbonate (OS-MALICK 500 mg [place]. Ca) tablet 1,250 mg     miconazole (MICATIN; MICRO GUARD) 2 % powder     cholecalciferol (vitamin D) tablet 2,000 Units     ferrous sulfate (IRON) tablet 325 mg     calcium acetate (PHOSLO) capsule 667 mg     glucose 40 % gel 15-30 g    Or     dextrose 50 % injection 25-50 mL    Or     glucagon injection 1 mg     naloxone (NARCAN) injection 0.1-0.4 mg     acetaminophen (TYLENOL) tablet 650 mg    Or     acetaminophen (TYLENOL) Suppository 650 mg     gabapentin (NEURONTIN) capsule 300 mg     traMADol (ULTRAM) half-tab 25 mg          Allergies   Allergen Reactions     Ciprofloxacin Nausea and Vomiting     Zofran did not help     Oxycodone Visual Disturbance     Delusions, blackouts      Lisinopril Cough     Penicillins Rash     Sulfa Drugs GI Disturbance     LOSS OF TASTE         Review of Systems:  -As per HPI      Physical exam:  Vitals: /72 (BP Location: Right arm)  Pulse 76  Temp 96.3  F (35.7  C) (Oral)  Resp 18  Wt  86.1 kg (189 lb 12.8 oz)  SpO2 96%  BMI 32.58 kg/m2  GEN: This is a well developed, well-nourished female in no acute distress, in a pleasant mood.    SKIN: Focused examination of the bilateral lower extremities was performed.  - Bilateral lower extremities with inverted champagne bottle appearance  - Tense edema throughout bilateral lower extremities  - Multiple shallow ulcers located over the bilateral lower legs, predominantly over the anterior shins. All examined ulcers have fibrinoid/granulation tissue at the base.  - Background of light brown hyperpigmentation over the bilateral lower legs  - No purpura identified  -No other lesions of concern on areas examined.     Laboratory:  Results for orders placed or performed during the hospital encounter of 08/12/17 (from the past 24 hour(s))   Glucose by meter   Result Value Ref Range    Glucose 220 (H) 70 - 99 mg/dL   Blood gas venous   Result Value Ref Range    Ph Venous 7.21 (L) 7.32 - 7.43 pH    PCO2 Venous 48 40 - 50 mm Hg    PO2 Venous 26 25 - 47 mm Hg    Bicarbonate Venous 19 (L) 21 - 28 mmol/L    Base Deficit Venous 8.5 mmol/L    FIO2 21.0    Calcium ionized whole blood   Result Value Ref Range    Calcium Ionized Whole Blood 4.0 (L) 4.4 - 5.2 mg/dL   Vancomycin level   Result Value Ref Range    Vancomycin Level 29.9 (HH) mg/L   Glucose by meter   Result Value Ref Range    Glucose 244 (H) 70 - 99 mg/dL   Blood gas venous   Result Value Ref Range    Ph Venous 7.28 (L) 7.32 - 7.43 pH    PCO2 Venous 42 40 - 50 mm Hg    PO2 Venous 69 (H) 25 - 47 mm Hg    Bicarbonate Venous 20 (L) 21 - 28 mmol/L    Base Deficit Venous 6.4 mmol/L    FIO2 4L    Basic metabolic panel   Result Value Ref Range    Sodium 147 (H) 133 - 144 mmol/L    Potassium 3.8 3.4 - 5.3 mmol/L    Chloride 118 (H) 94 - 109 mmol/L    Carbon Dioxide 18 (L) 20 - 32 mmol/L    Anion Gap 11 3 - 14 mmol/L    Glucose 114 (H) 70 - 99 mg/dL    Urea Nitrogen 42 (H) 7 - 30 mg/dL    Creatinine 5.42 (H) 0.52 - 1.04  mg/dL    GFR Estimate 8 (L) >60 mL/min/1.7m2    GFR Estimate If Black 9 (L) >60 mL/min/1.7m2    Calcium 6.4 (L) 8.5 - 10.1 mg/dL   CBC with platelets   Result Value Ref Range    WBC 7.4 4.0 - 11.0 10e9/L    RBC Count 3.01 (L) 3.8 - 5.2 10e12/L    Hemoglobin 7.4 (L) 11.7 - 15.7 g/dL    Hematocrit 24.4 (L) 35.0 - 47.0 %    MCV 81 78 - 100 fl    MCH 24.6 (L) 26.5 - 33.0 pg    MCHC 30.3 (L) 31.5 - 36.5 g/dL    RDW 22.4 (H) 10.0 - 15.0 %    Platelet Count 207 150 - 450 10e9/L   Magnesium   Result Value Ref Range    Magnesium 1.8 1.6 - 2.3 mg/dL   Phosphorus   Result Value Ref Range    Phosphorus 5.7 (H) 2.5 - 4.5 mg/dL   Calcium ionized whole blood   Result Value Ref Range    Calcium Ionized Whole Blood 4.0 (L) 4.4 - 5.2 mg/dL   Albumin level   Result Value Ref Range    Albumin 1.6 (L) 3.4 - 5.0 g/dL   Calcium ionized whole blood   Result Value Ref Range    Calcium Ionized Whole Blood 3.8 (L) 4.4 - 5.2 mg/dL   Vancomycin level   Result Value Ref Range    Vancomycin Level 29.8 (HH) mg/L   Glucose by meter   Result Value Ref Range    Glucose 109 (H) 70 - 99 mg/dL   Glucose by meter   Result Value Ref Range    Glucose 142 (H) 70 - 99 mg/dL     *Note: Due to a large number of results and/or encounters for the requested time period, some results have not been displayed. A complete set of results can be found in Results Review.       Dr. Chris Pond staffed the patient.    Staff Involved:  Resident(Crow Abbott)/Staff(as above)  Crow Solano MD  Dermatology resident, PGY-4  [phone]

## 2017-08-16 NOTE — PROGRESS NOTES
VASCULAR SURGERY SIGN-OFF NOTE    HPI:  HPI: Mrs. Banks is a 77- year female who was transferred to [facility] from an outside hospital on 8/14/2017 for evaluation and treatment of diarrhea and acidosis. Vascular Surgery was consulted for bilateral lower extremity ulcers of 1-3 months duration.     SUBJECTIVE: Not evaluated today; note for documentation purposes.     OBJECTIVE:  Vital signs:  Temp: 97.5  F (36.4  C) Temp src: Oral BP: (!) 170/92   Heart Rate: 93 Resp: 18 SpO2: 98 % O2 Device: None (Room air)        Intake/Output Summary (Last 24 hours) at 08/16/17 1043  Last data filed at 08/16/17 1000   Gross per 24 hour   Intake             1600 ml   Output             7700 ml   Net            -6100 ml       Vitals:    08/12/17 1630 08/14/17 0048 08/15/17 0447   Weight: 83.3 kg (183 lb 11.2 oz) 87.1 kg (192 lb) 86.1 kg (189 lb 12.8 oz)         8/15/2017 LACI RESULTS:  Right:        Arm: 190 mmHg   PT at ankle: Noncompressible   DP at foot: 227 mmHg   LACI: 1.19   TBI: 0.97      Toe PPG: Normal      Left:       Arm: 180 mmHg   PT at ankle: 214 mmHg   DP at foot: 217 mmHg   LACI: 1.14   TBI: 0.64      Toe PPG: Normal    ASSESSMENT/PLAN:  #1 Ulcers, bilateral lower legs, likely secondary to venous stasis, diabetes mellitus, and atherosclerosis   #2 Diabetes mellitus, unknown medication or control  #3 Status post coronary artery bypass grafting  #4 Chronic kidney disease, likely stage V, not on dialysis   - right great toe pressure 154; left great toe pressure 129  - really need TcPO2, which can only be obtained as an outpatient  - we will sign off, I will enter follow-up orders to see Dr. Cardenas after discharge   - recommend Dermatology Consult for calciphylaxis biopsy, if she has calciphylaxis would likely need dialysis  - does not appear to be straight forward peripheral artery disease  - her renal function would only allow for CO studies, no contrast  - at this time she is not acutely threatened; her main worry is  amputation, which I do not think will be Dr. Cardenas's recommendation at this time  - acute threats include acute arterial occlusion and life-threatening infection, which I do not suspect at this time  - Dr. Cardenas will see later today or tomorrow  - wound care as previously ordered      ANTI-PLATELET: Aspirin  ANTI-COAGULANT: Not warranted  STATIN: On pravastatin; need to consider high-dose statin therapy  DIABETES MEDICATIONS: Unknown  TOBACCO CESSATION: Non-smoker     Please contact Dr. Cardenas's Vascular Surgery Service with questions or concerns.     RHODA Pope, CNP  Division of Vascular Surgery  [facility]  Pager: [phone]

## 2017-08-16 NOTE — PLAN OF CARE
Problem: Goal Outcome Summary  Goal: Goal Outcome Summary  Transfer from 6B @ 1440. Admitted w/ MARTÍN, cellulitis, and positive UTI. Home CPAP for HS. Positive BCs. A&Ox4. A1 with walker. Renal diet. Strict I&O. R PIV SL. PT/OT. Wound cares completed by 6B ROSARIO. Coleman, patent with adequate output. Cath cares still needs to be completed. On IV Omnipen/Cefepime. Derm consult today for determine venous insufficiency and r/o calciphylaxis. Phos elevated @ 5.7. Creat elevated @ 5.42. Continue to monitor and follow the POC.

## 2017-08-16 NOTE — PROGRESS NOTES
[facility] Service - Internal Medicine Resident Progress Note  Date of Service: 08/16/2017    Patient: Kathryn Banks  MRN: [id number]  Admission Date: 8/12/2017  Hospital Day # 4    Assessment & Plan:  Kathryn Banks is a 77 year old female with a history of CAD s/p CABD x5, HFpEF (60-65%) CKD 4, anemia, Type 2 DM, AFib s/p ablation 6/2017, presents with acute on chronic kidney disease and metabolic acidosis in the setting of possible cellulitis vs UTI as source.     # MARTÍN on CKD stage 4  Etiology of MARTÍN not entirely clear, however initially looked prerenal from sepsis and poor PO intake. However, now looks more volume overloaded with weight up, so cardiorenal more of a consideration. Making urine with diuretics. Acidosis improving with sodium bicarb. Electrolytes look good. No acute need for HD.   - Nephrology following, appreciate recs  - hold lasix - as patient has had 3000 ml UOP without diuretics   - continue moreira for strict I&Os  - Continue to monitor BMP  - Has nephrology f/u on 8/22/17      # Sepsis 2/2 UTI vs cellulitis  # bilateral LE cellulitis - P-sen psuedomonas  # UTI  Had intermittent low BP earlier in admission, along with end organ damage (MARTÍN). Urine cx growing E. Coli, Enterococcus, and Citrobacter. LE wound culture growing pseudomonas, other speciation pending. Was on zosyn and vancomycin initially.  - continue cefepime and start ampicillin   - discontinue vancomycin 8/16      # Non-anion gap metabolic acidosis - Improving  On admission pH was 7.06. Initially there was concern that Kathryn would need dialysis, however with treatment of the infection with antibiotics and sodium bicarbonate, her pH has improved. Her baseline CO2 is 21.   - Continue PTA bicarb TID increased to 1300mg (per renal recs)  - VBG BID until stabilized     # Lower extremity ulcers  Etiology unclear, possibly due to arterial insufficiency vs venous insufficiency. CT was negative for presence of deep tissue infection.  Wound care following. ABIs 8/16 however vessels non-compressable so toe-brachial index was done showed no mircovascular disease.  - Vascular surgery consulted - will follow as outpatient for TCO2 evaluation, no other interventions at this time  - Antibiotics as above given likelihood of infected ulcers  - Tramadol PRN for pain  - Dermatology consulted 8/16 with concern for calciphylaxis contributing to ulcers      # HFpEF EF 60-65%  # CAD s/p CABD x5  Last echo was 5/31 with EF 60-65%, concentric LV hypertrophy, biatrial enlargement, and elevated RV pressures. BNP was in the 40k, however with her renal failure and oliguria there is extra fluid on board which would cause an increase in her BNP.   - hold Lasix 40 mg  Daily (PTA dosing)  - Daily weights  - continue ASA  - I/O      # Hx of AFib s/p ablation 6/2017  Follows w/ Dr. Knig of cardiology, last seen on 8/10, at which time warfarin was stopped. EKG with sinus rhythm.   - Continue telemetry  - Restart PTA metoprolol 25 mg BID     # Small bilateral pleural effusions   No problems breathing, however blunting of costophrenic ancle on CXR and small bilateral pulm effusions on CT. Likely etiology from MARTÍN and HFpEF  - Diuresis as above      # Blurry vision  Patient with lens implants so with fluid shifts, this could be why she is having blurry vision.   - If not improving with diuresis with consider head imaging and opthalmology.      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 Last HgbA1c 7.3, which is good control for her age and comorbidities  - Monitor  - Continue gabapentin for neuropathy     # Secondary hyperparathyroidism  # Vitamin D deficiency  , phos 6.1, Ca ionized 3.9, vit D 15  - Phoslo  - cholecalciferol 2,000 units qday     # Chronic normocytic Anemia  Hgb 7.5 - 8 while here. Baseline 8-9 most recently. Does not appear overly iron deficient based on last iron panel. May have some degree of anemia from CKD  -  Continue iron supplements  - F/u with nephrology     # Abdominal pain   # diarrhea  Per patient is chronic, with some blood from hemmheroids. CT shows mesenteric haziness- edema vs enteritis- however WBC and lactate are wnl, so unlikely due to ischemic or infectious process.   - Montior and treat symptoms as needed     # Gluteal cyst  Incidental finding, most likely cyst but if there is a concern for infection and source is unclear, can revisit this as a source. General surgery evaluated patient and no indication for I&D  - Monitor      CODE: FULL  DVT: SCD  FEN: Renal diet  Disposition: Pending medical optimization with regard to acidemia, MARTÍN, and infection.       Patient staffed with Dr. Lorenzo who agrees with above plan    Gilda Mejia MD  Internal Medicine & Pediatrics PGY1  [facility]  Pager: [phone]    ___________________________________________________________________    Subjective & Interval Hx:    Hypertensive overnight needing prn hydralazine. Had 5 L mixed stool and urine ouput yesterday - moreira was placed. ABIs were unrevealing - will need further evaluation with vascular surgery as outpatient. Double vision is still present.     Last 24 hr care team notes reviewed.   ROS:  4 point ROS including Respiratory, CV, GI and  (other than that noted in the HPI) is negative    Medications:  Reviewed in EPIC. List below for reference    Physical Exam:  Blood pressure (!) 166/94, pulse 76, temperature 97.9  F (36.6  C), temperature source Oral, resp. rate 18, weight 86.1 kg (189 lb 12.8 oz), SpO2 100 %.    I/O last 3 completed shifts:  In: 1670 [P.O.:1470; I.V.:200]  Out: 4500 [Urine:1900; Stool:2600]    General: alert and in no acute distress, pleasantly interactive, sitting on the edge of her bed  Head: normocephalic, atraumatic  Eyes: non-icteric sclera, EOMI, PERRL  Pulmonary: crackles in bilateral bases, good air movement  CV: RRR, +s1/s2, HELIO present  GI: soft, non-tender, non-distended, + bowel  sounds  Skin: no rashes seen  EXT: no edema, WWP  Neuro: A&Ox3, no focal deficits    Labs & Studies of Note:   Labs and studies reviewed in EPIC    Unresulted Labs Ordered in the Past 30 Days of this Admission     Date and Time Order Name Status Description    8/12/2017 2119 Blood culture Preliminary     8/12/2017 2119 Blood culture Preliminary     8/12/2017 1805 Wound Culture Aerobic Bacterial Preliminary     8/12/2017 1800 ILA Preliminary     8/12/2017 1800 ILA Preliminary           Medications list for Reference:   Current Facility-Administered Medications   Medication     hydrALAZINE (APRESOLINE) injection 10 mg     amLODIPine (NORVASC) tablet 5 mg     ceFEPIme (MAXIPIME) 500 mg in NaCl 0.9 % 100 mL intermittent infusion     sodium bicarbonate tablet 1,300 mg     metoprolol (LOPRESSOR) half-tab 12.5 mg     aspirin EC EC tablet 81 mg     calcium carbonate (OS-MALICK 500 mg [place]. Ca) tablet 1,250 mg     miconazole (MICATIN; MICRO GUARD) 2 % powder     vancomycin place maki - receiving intermittent dosing     cholecalciferol (vitamin D) tablet 2,000 Units     ferrous sulfate (IRON) tablet 325 mg     calcium acetate (PHOSLO) capsule 667 mg     glucose 40 % gel 15-30 g    Or     dextrose 50 % injection 25-50 mL    Or     glucagon injection 1 mg     naloxone (NARCAN) injection 0.1-0.4 mg     acetaminophen (TYLENOL) tablet 650 mg    Or     acetaminophen (TYLENOL) Suppository 650 mg     gabapentin (NEURONTIN) capsule 300 mg     traMADol (ULTRAM) half-tab 25 mg

## 2017-08-16 NOTE — PLAN OF CARE
Neuro: A&Ox4.   Cardiac: SR. VSS. B/P elevated.   Respiratory: Sating 98 on RA.  GI/: Adequate urine output. BM X2, loose stools.  Diet/appetite: Tolerating Renal diet. Fluid restriction lifted.   Activity:  Assist of 1, up to chair and in halls.  Pain: At acceptable level on current regimen.   Skin: Ulcers on bilateral legs. Derm saw patient. Lesions redressed.    Transferred pt to     Plan: Continue with POC. Notify primary team with changes.

## 2017-08-16 NOTE — PHARMACY-VANCOMYCIN DOSING SERVICE
Pharmacy Vancomycin Note  Date of Service 2017  Patient's  1940   77 year old, female    Indication: Skin and Soft Tissue Infection and Urinary Tract Infection  Goal Trough Level: 10-15 mg/L  Day of Therapy: 4  Current Vancomycin regimen:  Intermittent dosing    Current estimated CrCl = Estimated Creatinine Clearance: 9.5 mL/min (based on Cr of 5.42).    Creatinine for last 3 days  2017:  8:17 AM Creatinine 5.49 mg/dL  8/15/2017:  4:36 AM Creatinine 5.51 mg/dL  2017:  4:30 AM Creatinine 5.42 mg/dL    Recent Vancomycin Levels (past 3 days)  2017:  5:09 PM Vancomycin Level 17.2 mg/L  2017:  8:17 AM Vancomycin Level 24.4 mg/L  8/15/2017:  7:58 PM Vancomycin Level 29.9 mg/L  2017:  7:36 AM Vancomycin Level 29.8 mg/L    Vancomycin IV Administrations (past 72 hours)                   vancomycin (VANCOCIN) 1,250 mg in D5W 250 mL intermittent infusion (mg) 1,250 mg New Bag 17 2133                Nephrotoxins and other renal medications (Future)    Start     Dose/Rate Route Frequency Ordered Stop    17 1144  vancomycin place maki - receiving intermittent dosing      1 each Does not apply SEE ADMIN INSTRUCTIONS 17 1144               Contrast Orders - past 72 hours (72h ago through future)    Start     Dose/Rate Route Frequency Ordered Stop    17 1200  perflutren diluted 1mL to 2mL with saline (OPTISON) diluted injection 3 mL      3 mL Intravenous ONCE 17 1148 17 1200          Interpretation of levels and current regimen:  Trough level is  Supratherapeutic    Has serum creatinine changed > 50% in last 72 hours: No    Urine output:  good urine output    Renal Function: Stable    Plan:  1.  Continue intermittent dosing and monitoring  2.  Pharmacy will check trough levels as appropriate in 1 day to assess clearance.    3. Serum creatinine levels will be ordered daily for the first week of therapy and at least twice weekly for subsequent weeks.       Anel Hodge, PharmD        .

## 2017-08-17 ENCOUNTER — APPOINTMENT (OUTPATIENT)
Dept: OCCUPATIONAL THERAPY | Facility: CLINIC | Age: 77
DRG: 291 | End: 2017-08-17
Attending: INTERNAL MEDICINE
Payer: MEDICARE

## 2017-08-17 LAB
ANION GAP SERPL CALCULATED.3IONS-SCNC: 10 MMOL/L (ref 3–14)
BASE DEFICIT BLDV-SCNC: 5.5 MMOL/L
BUN SERPL-MCNC: 41 MG/DL (ref 7–30)
CA-I BLD-MCNC: 3.8 MG/DL (ref 4.4–5.2)
CALCIUM SERPL-MCNC: 6.2 MG/DL (ref 8.5–10.1)
CHLORIDE SERPL-SCNC: 116 MMOL/L (ref 94–109)
CO2 SERPL-SCNC: 20 MMOL/L (ref 20–32)
CREAT SERPL-MCNC: 5.25 MG/DL (ref 0.52–1.04)
ERYTHROCYTE [DISTWIDTH] IN BLOOD BY AUTOMATED COUNT: 22.7 % (ref 10–15)
GFR SERPL CREATININE-BSD FRML MDRD: 8 ML/MIN/1.7M2
GLUCOSE BLDC GLUCOMTR-MCNC: 152 MG/DL (ref 70–99)
GLUCOSE BLDC GLUCOMTR-MCNC: 168 MG/DL (ref 70–99)
GLUCOSE BLDC GLUCOMTR-MCNC: 206 MG/DL (ref 70–99)
GLUCOSE SERPL-MCNC: 150 MG/DL (ref 70–99)
HCO3 BLDV-SCNC: 20 MMOL/L (ref 21–28)
HCT VFR BLD AUTO: 24.8 % (ref 35–47)
HGB BLD-MCNC: 7.4 G/DL (ref 11.7–15.7)
MCH RBC QN AUTO: 24.3 PG (ref 26.5–33)
MCHC RBC AUTO-ENTMCNC: 29.8 G/DL (ref 31.5–36.5)
MCV RBC AUTO: 82 FL (ref 78–100)
O2/TOTAL GAS SETTING VFR VENT: 21 %
PCO2 BLDV: 41 MM HG (ref 40–50)
PH BLDV: 7.31 PH (ref 7.32–7.43)
PLATELET # BLD AUTO: 217 10E9/L (ref 150–450)
PO2 BLDV: 55 MM HG (ref 25–47)
POTASSIUM SERPL-SCNC: 3.9 MMOL/L (ref 3.4–5.3)
RBC # BLD AUTO: 3.04 10E12/L (ref 3.8–5.2)
SODIUM SERPL-SCNC: 145 MMOL/L (ref 133–144)
WBC # BLD AUTO: 6.3 10E9/L (ref 4–11)

## 2017-08-17 PROCEDURE — 85027 COMPLETE CBC AUTOMATED: CPT | Performed by: STUDENT IN AN ORGANIZED HEALTH CARE EDUCATION/TRAINING PROGRAM

## 2017-08-17 PROCEDURE — 36415 COLL VENOUS BLD VENIPUNCTURE: CPT | Performed by: STUDENT IN AN ORGANIZED HEALTH CARE EDUCATION/TRAINING PROGRAM

## 2017-08-17 PROCEDURE — 93010 ELECTROCARDIOGRAM REPORT: CPT | Performed by: INTERNAL MEDICINE

## 2017-08-17 PROCEDURE — 12000001 ZZH R&B MED SURG/OB UMMC

## 2017-08-17 PROCEDURE — 25000132 ZZH RX MED GY IP 250 OP 250 PS 637: Mod: GY | Performed by: INTERNAL MEDICINE

## 2017-08-17 PROCEDURE — 93005 ELECTROCARDIOGRAM TRACING: CPT

## 2017-08-17 PROCEDURE — A9270 NON-COVERED ITEM OR SERVICE: HCPCS | Mod: GY | Performed by: INTERNAL MEDICINE

## 2017-08-17 PROCEDURE — 25000132 ZZH RX MED GY IP 250 OP 250 PS 637: Mod: GY | Performed by: STUDENT IN AN ORGANIZED HEALTH CARE EDUCATION/TRAINING PROGRAM

## 2017-08-17 PROCEDURE — 40000275 ZZH STATISTIC RCP TIME EA 10 MIN

## 2017-08-17 PROCEDURE — A9270 NON-COVERED ITEM OR SERVICE: HCPCS | Mod: GY | Performed by: STUDENT IN AN ORGANIZED HEALTH CARE EDUCATION/TRAINING PROGRAM

## 2017-08-17 PROCEDURE — 97530 THERAPEUTIC ACTIVITIES: CPT | Mod: GO

## 2017-08-17 PROCEDURE — 25000128 H RX IP 250 OP 636: Performed by: INTERNAL MEDICINE

## 2017-08-17 PROCEDURE — 97535 SELF CARE MNGMENT TRAINING: CPT | Mod: GO

## 2017-08-17 PROCEDURE — 40000133 ZZH STATISTIC OT WARD VISIT

## 2017-08-17 PROCEDURE — 80048 BASIC METABOLIC PNL TOTAL CA: CPT | Performed by: STUDENT IN AN ORGANIZED HEALTH CARE EDUCATION/TRAINING PROGRAM

## 2017-08-17 PROCEDURE — 25000132 ZZH RX MED GY IP 250 OP 250 PS 637: Mod: GY

## 2017-08-17 PROCEDURE — 82330 ASSAY OF CALCIUM: CPT | Performed by: INTERNAL MEDICINE

## 2017-08-17 PROCEDURE — A9270 NON-COVERED ITEM OR SERVICE: HCPCS | Mod: GY

## 2017-08-17 PROCEDURE — 82803 BLOOD GASES ANY COMBINATION: CPT | Performed by: INTERNAL MEDICINE

## 2017-08-17 PROCEDURE — 99233 SBSQ HOSP IP/OBS HIGH 50: CPT | Mod: GC | Performed by: INTERNAL MEDICINE

## 2017-08-17 PROCEDURE — 00000146 ZZHCL STATISTIC GLUCOSE BY METER IP

## 2017-08-17 RX ORDER — AMOXICILLIN 500 MG/1
500 CAPSULE ORAL EVERY 24 HOURS
Status: DISCONTINUED | OUTPATIENT
Start: 2017-08-17 | End: 2017-08-18 | Stop reason: HOSPADM

## 2017-08-17 RX ORDER — LEVOFLOXACIN 250 MG/1
250 TABLET, FILM COATED ORAL
Status: DISCONTINUED | OUTPATIENT
Start: 2017-08-19 | End: 2017-08-18 | Stop reason: HOSPADM

## 2017-08-17 RX ORDER — LEVOFLOXACIN 250 MG/1
250 TABLET, FILM COATED ORAL DAILY
Status: DISCONTINUED | OUTPATIENT
Start: 2017-08-17 | End: 2017-08-17

## 2017-08-17 RX ORDER — LEVOFLOXACIN 500 MG/1
500 TABLET, FILM COATED ORAL ONCE
Status: COMPLETED | OUTPATIENT
Start: 2017-08-17 | End: 2017-08-17

## 2017-08-17 RX ADMIN — PENTOXIFYLLINE 400 MG: 400 TABLET, FILM COATED, EXTENDED RELEASE ORAL at 08:24

## 2017-08-17 RX ADMIN — MELATONIN TAB 3 MG 3 MG: 3 TAB at 01:20

## 2017-08-17 RX ADMIN — CALCIUM ACETATE 667 MG: 667 CAPSULE ORAL at 08:23

## 2017-08-17 RX ADMIN — AMOXICILLIN 500 MG: 500 CAPSULE ORAL at 14:15

## 2017-08-17 RX ADMIN — LEVOFLOXACIN 500 MG: 500 TABLET, FILM COATED ORAL at 15:26

## 2017-08-17 RX ADMIN — VITAMIN D, TAB 1000IU (100/BT) 2000 UNITS: 25 TAB at 08:25

## 2017-08-17 RX ADMIN — CALCIUM 1250 MG: 500 TABLET ORAL at 08:24

## 2017-08-17 RX ADMIN — ASPIRIN 81 MG: 81 TABLET, COATED ORAL at 08:25

## 2017-08-17 RX ADMIN — METOPROLOL TARTRATE 25 MG: 25 TABLET ORAL at 20:19

## 2017-08-17 RX ADMIN — AMOXICILLIN 500 MG: 500 CAPSULE ORAL at 18:46

## 2017-08-17 RX ADMIN — CALCIUM ACETATE 667 MG: 667 CAPSULE ORAL at 12:42

## 2017-08-17 RX ADMIN — AMPICILLIN SODIUM 1 G: 1 INJECTION, POWDER, FOR SOLUTION INTRAMUSCULAR; INTRAVENOUS at 04:58

## 2017-08-17 RX ADMIN — ACETAMINOPHEN 650 MG: 325 TABLET, FILM COATED ORAL at 18:46

## 2017-08-17 RX ADMIN — CALCIUM ACETATE 667 MG: 667 CAPSULE ORAL at 18:46

## 2017-08-17 RX ADMIN — AMLODIPINE BESYLATE 10 MG: 10 TABLET ORAL at 08:23

## 2017-08-17 RX ADMIN — GABAPENTIN 300 MG: 300 CAPSULE ORAL at 21:42

## 2017-08-17 RX ADMIN — FERROUS SULFATE TAB 325 MG (65 MG ELEMENTAL FE) 325 MG: 325 (65 FE) TAB at 08:23

## 2017-08-17 RX ADMIN — METOPROLOL TARTRATE 25 MG: 25 TABLET ORAL at 08:24

## 2017-08-17 RX ADMIN — SODIUM BICARBONATE 650 MG TABLET 1300 MG: at 08:23

## 2017-08-17 RX ADMIN — SODIUM BICARBONATE 650 MG TABLET 1300 MG: at 20:20

## 2017-08-17 RX ADMIN — CEFEPIME 500 MG: 2 INJECTION, POWDER, FOR SOLUTION INTRAMUSCULAR; INTRAVENOUS at 08:43

## 2017-08-17 RX ADMIN — TRAMADOL HYDROCHLORIDE 25 MG: 50 TABLET, FILM COATED ORAL at 06:25

## 2017-08-17 RX ADMIN — SODIUM BICARBONATE 650 MG TABLET 1300 MG: at 15:26

## 2017-08-17 NOTE — PLAN OF CARE
Problem: Goal Outcome Summary  Goal: Goal Outcome Summary  Outcome: No Change  A&O intermittently forgetful/confused. VSS elevated BPs intermittently. On RA. Tolerating renal diet. BGs 244 & 216. Denies pain/nausea. Up with assist x1 and walker. Commode in room, no BM this shift. Cee patent, good output. PIV SL with intermittent abx. Dressing on BLE CDI. Phos elevated 5.7 Cr 5.42. Will continue to monitor and follow POC.

## 2017-08-17 NOTE — PLAN OF CARE
Alert and oriented, forgetfulness.VSS.   Patient reports leg pain. Pain managed with elevating legs and tylenol.  Cee removed at 1500. Due to void. BLE ACE wraps, skin dry and taut, 2+ edema.  PIV saline locked. Renal diet, tolerating well.  Ambulates with walker and assist of one.  Plan to discharge to TCU tomorrow.  Call light within reach, continue with plan of care.

## 2017-08-17 NOTE — PROGRESS NOTES
Social Work: Assessment with Discharge Plan    Patient Name:  Kathryn Banks  :  1940  Age:  77 year old  MRN:  [id number]  Risk/Complexity Score:  Filed Complexity Screen Score: 13  Completed assessment with:  Kathryn, chart review, medicine, TCU    Presenting Information   Reason for Referral:  Discharge plan  Date of Intake:  2017  Referral Source:  Chart Review  Decision Maker:  Self  Alternate Decision Maker:    Health Care Directive:  Copy in Chart  Living Situation:  House lives with Spouse, Daughter, Grand-daughter  Previous Functional Status:  Independent Assist as needed with mobility, transfers, cooking, driving  Patient and family understanding of hospitalization:  appropriate  Cultural/Language/Spiritual Considerations:  None noted  Adjustment to Illness:  Appropriate, openly accepting of rehab placement prior to return to home    Physical Health  Reason for Admission:    1. Acute kidney injury (nontraumatic) (H)      Services Needed/Recommended:  TCU    Mental Health/Chemical Dependency  Diagnosis:  NA  Support/Services in Place:    Services Needed/Recommended:      Support System  Significant relationship at present time:  Spouse, [place]  Family of origin is available for support:  Yes  Other support available:  Daughter Jennifer, Grand-daughter  Gaps in support system:  na  Patient is caregiver to:  None     Provider Information   Primary Care Physician:  Dheeraj Jj   [phone]   Clinic:  [facility] [address]  / JEFFERY KRAMER [address]*      :  ALEJANDRO    Financial   Income Source:  Social Security  Financial Concerns:  None noted  Insurance:    Payor/Plan Subscriber Name Rel Member # Group #   MEDICARE - MEDICARE F* KATHRYN BANKS  [id number]       ATTN CLAIMS, [address]   BCBS - BCBS PLATINUM * KATHRYN BANKS  [id number]      [address] [address]       Discharge Plan   Patient and family discharge goal:  Patient requesting TCU  placement prior to DC home  Provided education on discharge plan:  YES  Patient agreeable to discharge plan:  YES  A list of Medicare Certified Facilities was provided to the patient and/or family to encourage patient choice. Patient's choices for facility are:  [facility], [facility] (aware an accepting of the private room fee at [facility])  Will NH provide Skilled rehabilitation or complex medical:  YES  General information regarding anticipated insurance coverage and possible out of pocket cost was discussed. Patient and patient's family are aware patient may incur the cost of transportation to the facility, pending insurance payment: YES  Barriers to discharge:  Medical stability    Discharge Recommendations   Anticipated Disposition:  Facility:  TCU, Friday  Transportation Needs:  Family:  Spouse/daughter  Name of Transportation Company and Phone:  na    Additional comments   SW spoke with [facility] admissions, they do have openings, faxed referral for review.   T: [phone], F: [phone]    Alyx RAMAN, MSW  5B  (Medical/Surgical)  Phone: [phone]  Pager: [phone]

## 2017-08-17 NOTE — PROGRESS NOTES
[facility]    { :[id number]} Progress Note    Date of Service (when Attending saw the patient): 08/17/2017    Interval History   No overnight history.  Patient feels well this morning with slight dizziness 2/2 persistent double vision.      Assessment & Plan   Medical Student Note Resident Note   Assessment and Plan (Student)      Assessment:  Ms. Michael is a 78 y/o woman w/ a PMH of CKD4, A-Fib s/p ablation, CAD s/p CABG x5, HTN, and T2DM who presents from [facility] w/ MARTÍN on CKD, metabolic acidosis, suspected pseudomonal cellulitis and UTI currently being treated w/ IV antibiotics.     Plan:  #MARTÍN on CKDV:  CKD 2/2 DM, HTN, renovascular disease, and previous MARTÍN with renal US showing echogenic appearance, no hydronephrosis consistent with CKD.  Baseline Cr ~4; creatinine now decreasing ~5.25.  MARTÍN 2/2 HF exacerbation vs third spacing from sepsis in setting of possible renal vascular dx (i.e., stenosis).  Patient continues to have high urine output possible 2/2 ATN diuresis phase.  Hold diuresis for today.  Reintroduced home doses of amlodipine, metoprolol which have stabilized bps.  -Renal following; appreciate recs  -Hold home lasix dose   -Monitor I/Os more closely w/ moreira  -Daily weights  -Trend creatinine  -F/U opt w/ Dr. Barfield 8/22  -cont. 10mg amlodipine qd and metoprolol 25mg      #Bilateral Pseudomonal Cellulitis  Patient presented b/c worsening leg pain/erythema and increased # ulcers for the past month to the ED.  WBCs stable at 7.  Derm evaluation suggests lipodermatosclerosis w/ ulcers conssitent with venous origin.  Lack of pain/purpura argue against calciphylaxis.   -Start pentoxifylline, 400mg TID (monitor GI side effect)  -wound care consult    -keep legs elevated  -Blood cultures NGTD  -switch to PO levofloxacin based on abx sensitivities  -F/U in derm clinic 4-6 weeks after d/c      #Lower extremity Arterial Insufficiency  On admission, bilateral  legs/feet found to be cold with non-palpable pulses and high pain in the setting of prior CAD CABGx5 and recent A-fib ablation suggest possible arterial insufficiency.  LACI y/d showed vascular calcification which complicates LACI reading.  Per vasc sgx, does not appear to be straight forward PAD and no current acute threats.  -F/U w/ Dr. Wyman after d/c for TcPO2 outpatient      #UTI:  Patient reports dysuria, increased frequency for past 2 weeks. sensitivities back - start PO meds and monitor today prior to d/c likely tomorrow.  -d/c cefepime and ampicillin  -start PO amoxicillin and levoquin      #Metabolic acidosis, non-anion gap, improving: Likely 2/2 to uremia.  Baseline bicarbonate low at 21; bicarb now 20 and pH up to 7.31.     -VBG Q 6 with goal pH>7.2  -Continue home Sodium Bicarbonate tabs 1300mg tid      #Electrolytes  Presented with hyperkalemia 5.6; now 3.8.  Mg2+ is normal. Ionized calcium still low at 3.8 and phosphorous elevated at 6.1.  PTH elevated ~700 3 weeks ago suggesting secondary hyperparathyroidism 2/2 to CKD. Mild hypernatremia now 2/2 to fluid restriction y/d.  Follow renal recs:  -encourage PO intake   -cont. Vit D 2000 units PO tab qd  -cont. phoslo (calcium acetate) TID 667mg w/ meals  -start Calcium gluconate 1g in D5W 100ml  -monitor BMP, iCa, Mg.     #Blurry vision  Blurry vision on/off for the past 2 months or so.  Currently worse during hospitalization.  Patient sees double.  Possible intracranial process like CVA.  Could be 2/2 to uremia although less likely given that is goes away with one eye closed.  -head ct  -ophtho consult in hospital if possible; otherwise f/u opt.      #HFpEF: Echo on 8/13 showed normal LVEF 60-65% with moderately reduced RV function.  Elevated pulmonary artery pressure (in setting of volume overload).  We repeated echo in setting of volume overload to check heart function.  Hx of HF exacerbations 2/2 non-compliance w/ home diuresis.  Not clinically  hypervolemic on examination, however BNP 46k y/d and CT w/ diffuse anasarca 2/2 MARTÍN.  Hypotensive on presentation to ED, but now w/ elevated BPs.  -monitor daily weights  -monitor I/O      #Supratherapeutic INR, improving: Patient last reports warfarin taken 8/11 despite being d/c in cardiology clinic 2 days prior.  S/p vitamin K.  INR was 5.47 y/d; now 1.36. Nephrology following if they decide to place tunnel dialysis cath.        #Incidental gluteal cyst vs abscess: Per CT, but not evident on examination.   - Monitor for now; if patient isn't improving w/ current abx regimen for cellulitis and uti, consider further evaluation of CT findings.  In the meantime surgery doesn't rec. I & D.      #Anemia 2/2 to Iron Deficiency & CKD  Hb is 7.3 down from 8.2 y/d.  Serum iron is low at 20.  MCV at 80.  Anemia could also be 2/2 to CKD, I.e., decreased kidney EPO production.  -start ferrous sulfate tab 325mg qd  -transfuse PRBC if <7; monitor for now      Cardiovascular:  #Hx of A-fib s/p ablation 6/2017: Follows w/ Dr. King of cardiology and seen last on 8/10.  In sinus rhythm, so warfarin d/c and metoprolol recently decreased 50mg->25mg  -monitor vitals  -hold metoprolol in setting of hypotension & bradycardia      #CAD s/p CABGx5  Resume asparin today after IR CVC Tunnel placement       T2DM, last A1C 7.3:  Not currently on any meds.  Blood sugars <150  -Monitor POCT glucoses      # Enteritis: Patient has 1 loose stool/day. Complains of suprapubic pain could be 2/2 UTI. CT with mesenteric haziness concerning for enteritis but could be 2/2 to anasarca.  Diarrhea once/day for past two months possibly started after UTI treatment 6-8 weeks ago.  Blood in stool 2x/month.    - Monitor  - Abx as below      #Nutrition  Renal diet      Pain:  -Tylenol PRN  -Cont. Home tramadol; renal dose given visual hallucinations w/ other opioids/inability to take NSAID's  -Continue gabapentin           Disposition Plan: 4-5 days pending  "clinical improvement, tolerating dialysis, improving labs and evaluation/treatment of lower leg pain and UTI/pyelo.  Assessment and Plan (Resident)    Assessment:  Kathryn Banks is a 77 year old female who was admitted on 8/12/2017. ***    Plan:  {Write a problem-based plan:***}    Disposition Plan: Expected discharge in *** days, once ***.      Physical Exam (Student)  /62 (BP Location: Right arm)  Pulse 73  Temp 96.6  F (35.9  C) (Oral)  Resp 16  Wt 86.1 kg (189 lb 12.8 oz)  SpO2 93%  BMI 32.58 kg/m2    Constitutional: Appears well/peaceful, AOx3, lying in bed, not in distress  HEENT: EOMI, reports double vision (sees two people instead of one)  Respiratory: Good airflow, crackles lower right lobe  Cardiovascular: normal S1/S2, RRR, asymptomatic systolic ejection murmer grade 2 w/ possible radiation to carotids  Skin/Integumen: Multiple bilateral lower extremity ulcers not on feet.  A couple were bleeding a bit.  There are some pustules bilaterally along with diffuse erythema consistent with cellulitis.  Ulcers have whitish appearance, some pus.      Data Interpretation  I have reviewed today's vital signs, medications, labs and imaging which are significant for ***.     Physical Exam (Resident)  {Choose blank or pick list and edit:349319}    Data Interpretation  I have reviewed today's vital signs, medications, labs and imaging which are significant for: ***   I personally reviewed {Choose images/EKG's you personally looked at today:741363::\"no images or EKG's today\"}.    {MEDICAL STUDENT SIGNATURE:126211} {RESIDENT SIGNATURE:148775}     Medications        amoxicillin  500 mg Oral Q24H     levofloxacin  500 mg Oral Once    Followed by     [START ON 8/19/2017] levofloxacin  250 mg Oral Q48H     amLODIPine  10 mg Oral Daily     metoprolol  25 mg Oral BID     pentoxifylline  400 mg Oral Daily     sodium bicarbonate  1,300 mg Oral TID     aspirin EC  81 mg Oral Daily     calcium carbonate  1,250 mg " Oral Daily     cholecalciferol  2,000 Units Oral Daily     ferrous sulfate  325 mg Oral Daily     calcium acetate  667 mg Oral TID w/meals     gabapentin  300 mg Oral At Bedtime       Data     Recent Labs  Lab 08/17/17  0702 08/16/17  0430 08/15/17  0436 08/14/17  0817  08/13/17  0210  08/12/17  1616 08/12/17  1013   WBC 6.3 7.4 7.6 7.7  < >  --   --  7.3 9.4   HGB 7.4* 7.4* 7.4* 7.6*  < >  --   --  7.8* 8.2*   MCV 82 81 82 81  < >  --   --  80 83    207 234 220  < >  --   --  248 286   INR  --   --   --  1.36*  --  2.35*  --  5.47*  --    * 147* 148* 147*  < >  --   < > 149* 146*   POTASSIUM 3.9 3.8 4.4 4.9  < >  --   < > 5.2 5.6*   CHLORIDE 116* 118* 119* 119*  < >  --   < > 125* 124*   CO2 20 18* 18* 16*  < >  --   < > 14* 13*   BUN 41* 42* 45* 48*  < >  --   < > 41* 42*   CR 5.25* 5.42* 5.51* 5.49*  < >  --   < > 5.07* 5.18*   ANIONGAP 10 11 11 13  < >  --   < > 10 9   MALICK 6.2* 6.4* 6.7* 6.5*  < >  --   < > 6.4* 6.4*   * 114* 120* 96  < >  --   < > 125* 136*   ALBUMIN  --  1.6*  --   --   --   --   --  1.9* 2.1*   PROTTOTAL  --   --   --   --   --   --   --  4.7* 5.0*   BILITOTAL  --   --   --   --   --   --   --  0.2 0.2   ALKPHOS  --   --   --   --   --   --   --  216* 228*   ALT  --   --   --   --   --   --   --  44 49   AST  --   --   --   --   --   --   --  32 45   < > = values in this interval not displayed.    No results found for this or any previous visit (from the past 24 hour(s)).

## 2017-08-17 NOTE — PROGRESS NOTES
Nephrology Progress Note  08/17/2017       Kathryn Banks is a 77 year old year old female  With hx of CKD 4 , due to chronic diabetes and CAD, presented with painful leg ulcer and cellulitis, MARTÍN on CKD, decreased UO and worsened metabolic acidosis (admission bicarb 13). Baseline creat in the ~ 4.0 range  .    Assessment & Recommendations:       1. MARTÍN - Creat declining today to 5.2 from the mid 5 range throughout this admission.   Etiology felt to be ATN 2/2 infection, hypotension, CKD, Vanco. Pt has UTI with > 100 K Ecoli. BC have been neg. She also had hypotension on 8/12 and 8/13 with SBP 80's/. Baseline creat ~ 4.0   - No acute indication for dialysis. GFR is unchanged this admission.    - Has outpt appt scheduled 8/22/17 with Dr Barfield   - Continue to monitor for recovery with daily chemistry labs   - Avoid nephrotoxins and hypotension   - Renal dose medications ( Vanc level is 29.8!)      2. CKD3/4 - Etiology felt to be DM, HTN, previous MARTÍN events and possibly some component of CRS. Patient follows with Dr Barfield in [place].       3. Volume status - Hypervolemia with edema. Her edema is mostly third spaced. No hypoxia, dyspnea. Albumin is 1.6. She is non oliguric with UO 4190 cc last 24 hrs and 3000 cc in stool output. -140/. Admission weight 83.3 kg. Weight 8/15 86.1 kg. No weight yet today   - Would hold further diuretics today.  Given large net fluid loss of 5.2 liters over last 24 hrs.   - Daily standing weights please   - Continue to monitor I/O      4. HTN - Controlled. -140/. PTA meds have been gradually reintroduced after being held for hypotension earlier this admission. PTA antihypertensives include:  Lopressor 25 mg bid, Amlodipine 10 mg qd, Lasix 40 mg qd.     - Would hold Lasix for today and reassess tomorrow   - Avoid hypotension in MARTÍN. Would allow blood pressure to drift into the 140/ range       5. Electrolytes - Hypernatremia resolved with liberalizing free water  consumption. Na 145. K 3.9.       6. Acid base - Metabolic Acidosis due to chronic diarrhea/MARTÍN/CKD. Currently bicarb 20   - Continue Bicarb 1300 mg TID       7. BMD - Ionized Ca 4.0, Phos 5.7   - Started on Phoslo 667 mg TID with meals on 8/13/17   - It is very difficult for patients to take binders in the hospital given timing of meds/food, etc. Would not change at this time. Can be reassessed as outpt.       8. Anemia - Hgb 7.4. Iron studies 8/13/17: Fe 20, Ferritin 188, Tsat 17%. On oral iron   - Patient is enrolled in anemia management services through Nephrology clinic      9. Antimicrobials - Switching to Po Levaquin and Amox.  Vanco discontinued. Vanco level 29.8 today. WBC 6.3, Afebrile. BC neg. UC > 100 K Ecoli. Wound cx + pseudomonas.      10. Leg lesions - Derm saw patient and diagnosed with lipodermatosclerosis. No bx was nec.       11. Nutrition - Tolerating diet, but appears significantly malnourished with albumin of 1.6.       Recommendations were communicated to primary team via progress note      Seen and discussed with Dr. Lux Rodas, NP   [phone]      Interval History :   Creat declining  She is non oliguric  Large stool output over last 24 hrs.  Leg lesions diagnosed as Lipodermatosclerosis      Review of Systems:       I reviewed the following systems:  GI: Reports good appetite. Having diarrhea with large stool output  Neuro:  no confusion  Constitutional:  no fever or chills  CV: denies dyspnea or CP. Has edema  : Has moreira    Physical Exam:   I/O last 3 completed shifts:  In: 2180 [P.O.:1978; I.V.:200]  Out: 2640 [Urine:2640]   /57 (BP Location: Right arm)  Pulse 73  Temp 96.8  F (36  C) (Oral)  Resp 16  Wt 86.1 kg (189 lb 12.8 oz)  SpO2 95%  BMI 32.58 kg/m2     GENERAL APPEARANCE: Comfortable, NAD  EYES:  no scleral icterus, pupils equal  PULM: lungs CTA. Breathing is non labored. Not on supplemental oxygen.   CV: RRR      -edema- multiple skin ulcers on  both legs. LE edema persists, legs tight  GI: soft, NT, non distended   : Cee present  NEURO:  Alert/oriented    Labs:   All labs reviewed by me  Electrolytes/Renal -   Recent Labs   Lab Test  08/17/17   0702  08/16/17   0430  08/15/17   0436  08/14/17   0817  08/13/17   0735   NA  145*  147*  148*  147*   --    POTASSIUM  3.9  3.8  4.4  4.9   --    CHLORIDE  116*  118*  119*  119*   --    CO2  20  18*  18*  16*   --    BUN  41*  42*  45*  48*   --    CR  5.25*  5.42*  5.51*  5.49*   --    GLC  150*  114*  120*  96   --    MALICK  6.2*  6.4*  6.7*  6.5*   --    MAG   --   1.8   --   2.0  1.9   PHOS   --   5.7*  6.1*  6.1*  6.4*       CBC -   Recent Labs   Lab Test  08/17/17   0702  08/16/17   0430  08/15/17   0436   WBC  6.3  7.4  7.6   HGB  7.4*  7.4*  7.4*   PLT  217  207  234       LFTs -   Recent Labs   Lab Test  08/16/17   0430  08/12/17   1616  08/12/17   1013   07/07/17   0616   ALKPHOS   --   216*  228*   --   197*   BILITOTAL   --   0.2  0.2   --   0.7   ALT   --   44  49   --   35   AST   --   32  45   --   18   PROTTOTAL   --   4.7*  5.0*   --   5.5*   ALBUMIN  1.6*  1.9*  2.1*   < >  2.7*    < > = values in this interval not displayed.       Iron Panel -   Recent Labs   Lab Test  08/13/17   0552  07/17/17   1054  06/21/17   0842   IRON  20*  23*  36   IRONSAT  17 16 17   GWEN  188  206  196       Current Medications:    amoxicillin  500 mg Oral Q24H     levofloxacin  500 mg Oral Once    Followed by     [START ON 8/19/2017] levofloxacin  250 mg Oral Q48H     amLODIPine  10 mg Oral Daily     metoprolol  25 mg Oral BID     pentoxifylline  400 mg Oral Daily     sodium bicarbonate  1,300 mg Oral TID     aspirin EC  81 mg Oral Daily     calcium carbonate  1,250 mg Oral Daily     cholecalciferol  2,000 Units Oral Daily     ferrous sulfate  325 mg Oral Daily     calcium acetate  667 mg Oral TID w/meals     gabapentin  300 mg Oral At Bedtime        Elsa Rodas, NP

## 2017-08-17 NOTE — PLAN OF CARE
Problem: Goal Outcome Summary  Goal: Goal Outcome Summary  Outcome: Improving  [id number]: VSS on room air, AO4, up with asst 1/walker. Pt denies pain/n/v. Renal diet tolerated. Pt's moreira removed at 1530- due to urinate. Pt has had frequent loose/watery stools - primary team text paged about bm's. Pt does state this is normal bm consistency. BLE dressing changed and secured. PIV SL. All antibiotic changed to oral. Plan for TCU placement tomorrow.  Pt states vision fluctuates from double vision to correct vision- md's aware

## 2017-08-17 NOTE — PLAN OF CARE
Problem: Goal Outcome Summary  Goal: Goal Outcome Summary  Outcome: Therapy, progress toward functional goals as expected  OT/5A - Pt scores 21/30 on MoCA version 7.1 indicating cognitive impairment; a score of 26 or greater is considered normal.  However, pt denies confusion and reports feeling near baseline cognitively. Facilitated functional mobility ~200 feet and ~100 feet with FWW and CGA-SBA. Pt limited by activity tolerance and strength.      REC: TCU

## 2017-08-17 NOTE — PROGRESS NOTES
[facility] - Internal Medicine Resident Progress Note  Date of Service: 08/17/2017    Patient: Kathryn Banks  MRN: [id number]  Admission Date: 8/12/2017  Hospital Day # 5    Assessment & Plan:  Kathryn Banks is a 77 year old female with a history of CAD s/p CABD x5, HFpEF (60-65%) CKD 4, anemia, Type 2 DM, AFib s/p ablation 6/2017, presents with acute on chronic kidney disease and metabolic acidosis in the setting of possible cellulitis vs UTI as source.     # MARTÍN on CKD stage 4  Etiology of MARTÍN not entirely clear, however initially looked prerenal from sepsis and poor PO intake. However, now looks more volume overloaded with weight up, so cardiorenal more of a consideration. Making urine without diuretics. Acidosis improving with sodium bicarb. Electrolytes look good. No acute need for HD.   - Nephrology following, appreciate recs  - hold lasix - as patient has had 4000 ml UOP without diuretics 8/16  - discontinue moreira 8/17 - bladder scan q shift prn  - trend daily weights  - Continue to monitor BMP daily  - Nephrology f/u on 8/22/17      # Sepsis 2/2 UTI vs cellulitis  # bilateral LE cellulitis - P-sen psuedomonas  # UTI  Had intermittent low BP earlier in admission, along with end organ damage (MARTÍN). Urine cx growing E. Coli, Enterococcus, and Citrobacter. LE wound culture growing pseudomonas, other speciation pending. Was on zosyn and vancomycin (discontinued 8/16) initially transitioned to cefepime and ampicillin (discontinued 8/17).  - discontinue cefepime and ampicillin  - start PO amoxicillin and levoquin    # Hypertension  Home doses of blood pressure medications were restarted 8/17 with stable blood pressures.  - continue PTA amlodipine and metoprolol      # Non-anion gap metabolic acidosis - Improving  On admission pH was 7.06. Initially there was concern that Kathryn would need dialysis, however with treatment of the infection with antibiotics and sodium bicarbonate, her pH has improved.  Her baseline CO2 is 21.   - Continue PTA bicarb TID increased to 1300mg (per renal recs)  - VBG daily     # Lower extremity ulcers  Etiology unclear, possibly due to arterial insufficiency vs venous insufficiency. CT was negative for presence of deep tissue infection. Wound care following. ABIs 8/16 however vessels non-compressable so toe-brachial index was done showed no mircovascular disease.  - Vascular surgery consulted - will follow as outpatient for TCO2 evaluation, no other interventions at this time  - Antibiotics as above given likelihood of infected ulcers  - Tramadol PRN for pain  - Dermatology consulted 8/16 - ulcers appear to be venous stasis, no need to biopsy, will see patient as outpatient       # HFpEF EF 60-65%  # CAD s/p CABD x5  Last echo was 5/31 with EF 60-65%, concentric LV hypertrophy, biatrial enlargement, and elevated RV pressures. BNP was in the 40k, however with her renal failure and oliguria there is extra fluid on board which would cause an increase in her BNP.   - hold Lasix 40 mg  Daily (PTA dosing)  - Daily weights  - continue ASA  - I/O      # Hx of AFib s/p ablation 6/2017  Follows w/ Dr. King of cardiology, last seen on 8/10, at which time warfarin was stopped. EKG with sinus rhythm.   - Continue telemetry  - continue PTA metoprolol 25 mg BID     # Small bilateral pleural effusions   No problems breathing, however blunting of costophrenic ancle on CXR and small bilateral pulm effusions on CT. Likely etiology from MARTÍN and HFpEF  - Diuresis as above      # Vertical Diplopia   Patient with lens implants so with fluid shifts, this could be why she is having blurry vision or the implanted lenses could have shifted. This diplopia has been present for months and is completely unchanged.  - Opthalmology appointment as outpatient for evaluation of implanted lenses     # Type 2 DM c/b neuropathy and nephropathy  Not on medication as an outpatient. Last HgbA1c 7.3, which is good control for  her age and comorbidities  - Monitor  - Continue gabapentin for neuropathy     # Secondary hyperparathyroidism  # Vitamin D deficiency  , phos 6.1, Ca ionized 3.9, vit D 15  - Phoslo  - cholecalciferol 2,000 units qday     # Chronic normocytic Anemia  Hgb 7.5 - 8 while here. Baseline 8-9 most recently. Does not appear overly iron deficient based on last iron panel. May have some degree of anemia from CKD  - Continue iron supplements  - F/u with nephrology     # Abdominal pain   # diarrhea  Per patient is chronic, with some blood from hemmheroids. CT shows mesenteric haziness- edema vs enteritis- however WBC and lactate are wnl, so unlikely due to ischemic or infectious process.   - Montior and treat symptoms as needed     # Gluteal cyst  Incidental finding, most likely cyst but if there is a concern for infection and source is unclear, can revisit this as a source. General surgery evaluated patient and no indication for I&D  - Monitor      CODE: FULL  DVT: SCD  FEN: Renal diet  Disposition: Pending medical optimization with regard to acidemia, MARTÍN, and infection.       Patient staffed with Dr. Lorenzo who agrees with above plan    Gilda Mejia MD  Internal Medicine & Pediatrics PGY1  [facility]  Pager: [phone]    ___________________________________________________________________    Subjective & Interval Hx:    Blood pressure have been well controlled on home regimen. UOP was yesterday was 4 L with decreasing UOP each shift and only 2 BM in the last 24 hours. Notes her only complaint is the continued vertical diplopia which is unchanged over the course of months. EKG done this morning without QTc prolongation.     Last 24 hr care team notes reviewed.   ROS:  4 point ROS including Respiratory, CV, GI and  (other than that noted in the HPI) is negative    Medications:  Reviewed in EPIC. List below for reference    Physical Exam:  Blood pressure 140/73, pulse 73, temperature 96.5  F (35.8  C), temperature  source Oral, resp. rate 18, weight 86.1 kg (189 lb 12.8 oz), SpO2 92 %.    I/O last 3 completed shifts:  In: 1940 [P.O.:1740; I.V.:200]  Out: 7190 [Urine:4190; Stool:3000]    General: alert and in no acute distress, pleasantly interactive, sitting on the edge of her bed  Head: normocephalic, atraumatic  Eyes: non-icteric sclera, EOMI, PERRL  Pulmonary: CTAB, good air movement  CV: RRR, +s1/s2, HELIO present  GI: soft, non-tender, non-distended, + bowel sounds  Skin: no rashes seen  EXT: improving edema, WWP  Neuro: A&Ox3, no focal deficits    Labs & Studies of Note:   Labs and studies reviewed in [facility]    Unresulted Labs Ordered in the Past 30 Days of this Admission     Date and Time Order Name Status Description    8/17/2017 0601 Calcium ionized whole blood In process     8/12/2017 2119 Blood culture Preliminary     8/12/2017 2119 Blood culture Preliminary     8/12/2017 1805 Wound Culture Aerobic Bacterial Preliminary     8/12/2017 1800 ILA Preliminary     8/12/2017 1800 ILA Preliminary           Medications list for Reference:   Current Facility-Administered Medications   Medication     hydrALAZINE (APRESOLINE) injection 10 mg     ampicillin (OMNIPEN) 1 g vial to attach to  ml bag for ADULTS or 50 ml bag for PEDS     amLODIPine (NORVASC) tablet 10 mg     metoprolol (LOPRESSOR) tablet 25 mg     pentoxifylline (TRENtal) CR tablet 400 mg     melatonin tablet 3 mg     ceFEPIme (MAXIPIME) 500 mg in NaCl 0.9 % 100 mL intermittent infusion     sodium bicarbonate tablet 1,300 mg     aspirin EC EC tablet 81 mg     calcium carbonate (OS-MALICK 500 mg [place]. Ca) tablet 1,250 mg     miconazole (MICATIN; MICRO GUARD) 2 % powder     cholecalciferol (vitamin D) tablet 2,000 Units     ferrous sulfate (IRON) tablet 325 mg     calcium acetate (PHOSLO) capsule 667 mg     glucose 40 % gel 15-30 g    Or     dextrose 50 % injection 25-50 mL    Or     glucagon injection 1 mg     naloxone (NARCAN) injection 0.1-0.4 mg     acetaminophen  (TYLENOL) tablet 650 mg    Or     acetaminophen (TYLENOL) Suppository 650 mg     gabapentin (NEURONTIN) capsule 300 mg     traMADol (ULTRAM) half-tab 25 mg

## 2017-08-17 NOTE — PLAN OF CARE
Problem: Goal Outcome Summary  Goal: Goal Outcome Summary  PT: Attempted PT with pt but pt toileting and then will shower. Declines PT at this time. Will check back as able.

## 2017-08-17 NOTE — TELEPHONE ENCOUNTER
Admitted.    Call date: 8/21    Yesy Samaniego, PharmD, [facility]  [phone]  August 17, 2017

## 2017-08-17 NOTE — PLAN OF CARE
Problem: Goal Outcome Summary  Goal: Goal Outcome Summary  Alert and oriented. Forgetful at times.  Complained of leg pain once this shift. Cee catheter intact. BLE dressings are clean, dry and intact . Slept with CPAP. Had 1 large loose BM this shift. Vital signs stable. Will continue to monitor and follow plan of care

## 2017-08-17 NOTE — PROVIDER NOTIFICATION
Primary team text paged to inform of pt's loose/watery stools. Pt states being baseline stool consistency.

## 2017-08-18 ENCOUNTER — CARE COORDINATION (OUTPATIENT)
Dept: CARE COORDINATION | Facility: CLINIC | Age: 77
End: 2017-08-18

## 2017-08-18 VITALS
DIASTOLIC BLOOD PRESSURE: 80 MMHG | HEART RATE: 73 BPM | TEMPERATURE: 95.7 F | BODY MASS INDEX: 31.03 KG/M2 | RESPIRATION RATE: 18 BRPM | WEIGHT: 180.8 LBS | OXYGEN SATURATION: 92 % | SYSTOLIC BLOOD PRESSURE: 151 MMHG

## 2017-08-18 LAB
ANION GAP SERPL CALCULATED.3IONS-SCNC: 12 MMOL/L (ref 3–14)
BACTERIA SPEC CULT: NO GROWTH
BACTERIA SPEC CULT: NO GROWTH
BASE DEFICIT BLDV-SCNC: 6.1 MMOL/L
BUN SERPL-MCNC: 40 MG/DL (ref 7–30)
CALCIUM SERPL-MCNC: 6.4 MG/DL (ref 8.5–10.1)
CHLORIDE SERPL-SCNC: 116 MMOL/L (ref 94–109)
CO2 SERPL-SCNC: 19 MMOL/L (ref 20–32)
CREAT SERPL-MCNC: 5.08 MG/DL (ref 0.52–1.04)
ERYTHROCYTE [DISTWIDTH] IN BLOOD BY AUTOMATED COUNT: 22.6 % (ref 10–15)
GFR SERPL CREATININE-BSD FRML MDRD: 8 ML/MIN/1.7M2
GLUCOSE BLDC GLUCOMTR-MCNC: 122 MG/DL (ref 70–99)
GLUCOSE BLDC GLUCOMTR-MCNC: 130 MG/DL (ref 70–99)
GLUCOSE BLDC GLUCOMTR-MCNC: 158 MG/DL (ref 70–99)
GLUCOSE SERPL-MCNC: 147 MG/DL (ref 70–99)
HCO3 BLDV-SCNC: 20 MMOL/L (ref 21–28)
HCT VFR BLD AUTO: 26.3 % (ref 35–47)
HGB BLD-MCNC: 8 G/DL (ref 11.7–15.7)
INTERPRETATION ECG - MUSE: NORMAL
MCH RBC QN AUTO: 24.8 PG (ref 26.5–33)
MCHC RBC AUTO-ENTMCNC: 30.4 G/DL (ref 31.5–36.5)
MCV RBC AUTO: 82 FL (ref 78–100)
O2/TOTAL GAS SETTING VFR VENT: 21 %
PCO2 BLDV: 41 MM HG (ref 40–50)
PH BLDV: 7.3 PH (ref 7.32–7.43)
PHOSPHATE SERPL-MCNC: 5.1 MG/DL (ref 2.5–4.5)
PLATELET # BLD AUTO: 205 10E9/L (ref 150–450)
PO2 BLDV: 59 MM HG (ref 25–47)
POTASSIUM SERPL-SCNC: 4.1 MMOL/L (ref 3.4–5.3)
RBC # BLD AUTO: 3.22 10E12/L (ref 3.8–5.2)
SODIUM SERPL-SCNC: 147 MMOL/L (ref 133–144)
SPECIMEN SOURCE: NORMAL
SPECIMEN SOURCE: NORMAL
WBC # BLD AUTO: 6.4 10E9/L (ref 4–11)

## 2017-08-18 PROCEDURE — A9270 NON-COVERED ITEM OR SERVICE: HCPCS | Mod: GY | Performed by: INTERNAL MEDICINE

## 2017-08-18 PROCEDURE — 85027 COMPLETE CBC AUTOMATED: CPT | Performed by: STUDENT IN AN ORGANIZED HEALTH CARE EDUCATION/TRAINING PROGRAM

## 2017-08-18 PROCEDURE — 00000146 ZZHCL STATISTIC GLUCOSE BY METER IP

## 2017-08-18 PROCEDURE — 25000132 ZZH RX MED GY IP 250 OP 250 PS 637: Mod: GY | Performed by: STUDENT IN AN ORGANIZED HEALTH CARE EDUCATION/TRAINING PROGRAM

## 2017-08-18 PROCEDURE — 25000132 ZZH RX MED GY IP 250 OP 250 PS 637: Mod: GY | Performed by: INTERNAL MEDICINE

## 2017-08-18 PROCEDURE — A9270 NON-COVERED ITEM OR SERVICE: HCPCS | Mod: GY | Performed by: STUDENT IN AN ORGANIZED HEALTH CARE EDUCATION/TRAINING PROGRAM

## 2017-08-18 PROCEDURE — 25000132 ZZH RX MED GY IP 250 OP 250 PS 637: Mod: GY

## 2017-08-18 PROCEDURE — A9270 NON-COVERED ITEM OR SERVICE: HCPCS | Mod: GY

## 2017-08-18 PROCEDURE — 99239 HOSP IP/OBS DSCHRG MGMT >30: CPT | Mod: GC | Performed by: INTERNAL MEDICINE

## 2017-08-18 PROCEDURE — 80048 BASIC METABOLIC PNL TOTAL CA: CPT | Performed by: STUDENT IN AN ORGANIZED HEALTH CARE EDUCATION/TRAINING PROGRAM

## 2017-08-18 PROCEDURE — 36415 COLL VENOUS BLD VENIPUNCTURE: CPT | Performed by: STUDENT IN AN ORGANIZED HEALTH CARE EDUCATION/TRAINING PROGRAM

## 2017-08-18 PROCEDURE — 84100 ASSAY OF PHOSPHORUS: CPT | Performed by: STUDENT IN AN ORGANIZED HEALTH CARE EDUCATION/TRAINING PROGRAM

## 2017-08-18 PROCEDURE — 82803 BLOOD GASES ANY COMBINATION: CPT | Performed by: STUDENT IN AN ORGANIZED HEALTH CARE EDUCATION/TRAINING PROGRAM

## 2017-08-18 RX ORDER — TRAMADOL HYDROCHLORIDE 50 MG/1
50-100 TABLET ORAL EVERY 6 HOURS PRN
Qty: 20 TABLET | Refills: 0 | Status: SHIPPED | OUTPATIENT
Start: 2017-08-18 | End: 2017-08-21

## 2017-08-18 RX ORDER — METOPROLOL TARTRATE 50 MG
25 TABLET ORAL 2 TIMES DAILY
Qty: 180 TABLET | Refills: 3 | DISCHARGE
Start: 2017-08-18 | End: 2017-10-22

## 2017-08-18 RX ORDER — ACETAMINOPHEN 325 MG/1
650 TABLET ORAL EVERY 4 HOURS PRN
Qty: 100 TABLET | DISCHARGE
Start: 2017-08-18 | End: 2017-08-21

## 2017-08-18 RX ORDER — AMLODIPINE BESYLATE 5 MG/1
10 TABLET ORAL DAILY
Qty: 30 TABLET | Refills: 3 | Status: ON HOLD | DISCHARGE
Start: 2017-08-18 | End: 2017-10-18

## 2017-08-18 RX ORDER — PRAVASTATIN SODIUM 40 MG
40 TABLET ORAL DAILY
Qty: 90 TABLET | Refills: 3 | DISCHARGE
Start: 2017-08-18 | End: 2018-07-20

## 2017-08-18 RX ORDER — CYANOCOBALAMIN 1000 UG/ML
1 INJECTION, SOLUTION INTRAMUSCULAR; SUBCUTANEOUS
Qty: 1 ML | Refills: 11 | DISCHARGE
Start: 2017-08-18 | End: 2017-08-21

## 2017-08-18 RX ORDER — FERROUS SULFATE 325(65) MG
325 TABLET ORAL
Qty: 100 TABLET | DISCHARGE
Start: 2017-08-18 | End: 2017-09-08

## 2017-08-18 RX ORDER — AMOXICILLIN 500 MG/1
500 CAPSULE ORAL EVERY 24 HOURS
Qty: 2 CAPSULE | Refills: 0 | DISCHARGE
Start: 2017-08-19 | End: 2017-08-21

## 2017-08-18 RX ORDER — PENTOXIFYLLINE 400 MG/1
400 TABLET, EXTENDED RELEASE ORAL DAILY
DISCHARGE
Start: 2017-08-18 | End: 2017-11-03

## 2017-08-18 RX ORDER — LACTOBACILLUS RHAMNOSUS GG 10B CELL
1 CAPSULE ORAL DAILY
Status: DISCONTINUED | OUTPATIENT
Start: 2017-08-18 | End: 2017-08-18 | Stop reason: HOSPADM

## 2017-08-18 RX ORDER — NITROGLYCERIN 0.4 MG/1
0.4 TABLET SUBLINGUAL EVERY 5 MIN PRN
Qty: 25 TABLET | Refills: 0 | DISCHARGE
Start: 2017-08-18

## 2017-08-18 RX ORDER — GABAPENTIN 300 MG/1
300 CAPSULE ORAL AT BEDTIME
Qty: 90 CAPSULE | Status: ON HOLD | DISCHARGE
Start: 2017-08-18 | End: 2017-10-18

## 2017-08-18 RX ORDER — LEVOFLOXACIN 250 MG/1
250 TABLET, FILM COATED ORAL
Qty: 1 TABLET | Refills: 0 | DISCHARGE
Start: 2017-08-19 | End: 2017-08-20

## 2017-08-18 RX ORDER — SODIUM BICARBONATE 650 MG/1
1300 TABLET ORAL 3 TIMES DAILY
Status: ON HOLD | DISCHARGE
Start: 2017-08-18 | End: 2017-10-02

## 2017-08-18 RX ADMIN — METOPROLOL TARTRATE 25 MG: 25 TABLET ORAL at 09:40

## 2017-08-18 RX ADMIN — ASPIRIN 81 MG: 81 TABLET, COATED ORAL at 09:35

## 2017-08-18 RX ADMIN — TRAMADOL HYDROCHLORIDE 25 MG: 50 TABLET, FILM COATED ORAL at 00:24

## 2017-08-18 RX ADMIN — CALCIUM 1250 MG: 500 TABLET ORAL at 09:34

## 2017-08-18 RX ADMIN — VITAMIN D, TAB 1000IU (100/BT) 2000 UNITS: 25 TAB at 09:28

## 2017-08-18 RX ADMIN — FERROUS SULFATE TAB 325 MG (65 MG ELEMENTAL FE) 325 MG: 325 (65 FE) TAB at 09:33

## 2017-08-18 RX ADMIN — CALCIUM ACETATE 667 MG: 667 CAPSULE ORAL at 09:35

## 2017-08-18 RX ADMIN — PENTOXIFYLLINE 400 MG: 400 TABLET, FILM COATED, EXTENDED RELEASE ORAL at 09:33

## 2017-08-18 RX ADMIN — CALCIUM ACETATE 667 MG: 667 CAPSULE ORAL at 12:22

## 2017-08-18 RX ADMIN — MELATONIN TAB 3 MG 3 MG: 3 TAB at 00:24

## 2017-08-18 RX ADMIN — AMLODIPINE BESYLATE 10 MG: 10 TABLET ORAL at 09:28

## 2017-08-18 RX ADMIN — SODIUM BICARBONATE 650 MG TABLET 1300 MG: at 09:35

## 2017-08-18 ASSESSMENT — PAIN DESCRIPTION - DESCRIPTORS: DESCRIPTORS: BURNING

## 2017-08-18 NOTE — PLAN OF CARE
Problem: Goal Outcome Summary  Goal: Goal Outcome Summary  OT 5A: Upon attempt this AM, pt with other provider. Upon PM attempt, pt reporting transport to be coming soon for d/c to TCU and politely declining therapy this date.     Occupational Therapy Discharge Summary    Reason for therapy discharge:    Discharged to transitional care facility.    Progress towards therapy goal(s). See goals on Care Plan in [facility] electronic health record for goal details.  Goals not met.  Barriers to achieving goals:   discharge from facility.    Therapy recommendation(s):    Continued therapy is recommended.  Rationale/Recommendations:  progress strength, endurance, and cognitive compensation for independence in ADLs..

## 2017-08-18 NOTE — PLAN OF CARE
Problem: Goal Outcome Summary  Goal: Goal Outcome Summary  Physical Therapy Discharge Summary     Reason for therapy discharge:    Discharged to transitional care facility.     Progress towards therapy goal(s). See goals on Care Plan in [facility] electronic health record for goal details.  Goals not met.  Barriers to achieving goals:   discharge from facility.     Therapy recommendation(s):    Continued therapy is recommended.  Rationale/Recommendations:  Progress deficits towards independence with functional mobility.

## 2017-08-18 NOTE — PLAN OF CARE
Problem: Goal Outcome Summary  Goal: Goal Outcome Summary  Patient alert and oriented forgetful at times. Vitals stable on room air. Patient complained of leg pain. Gave tramadol with relief. Up with assist to commode to void. Had one large stool this shift. Slept with CPAP. Plan to discharge to TCU today.

## 2017-08-18 NOTE — DISCHARGE SUMMARY
[facility] - Internal Medicine Discharge Summary   Date of Service: 8/18/2017    Kathryn Banks MRN# [id number]   YOB: 1940 Age: 77 year old     Date of Admission:  8/12/2017  Date of Discharge:  8/18/2017  Admitting Physician:  Ricco Chung MD  Discharge Physician:  Dr. Lorenzo  Discharging Service:  Internal Medicine, [place] [id number]     Primary Provider: Dheeraj Jj         Reason for Admission:   Kathryn Banks is a 77 year old female with a history of CAD s/p CABD x5, HFpEF (60-65%) CKD 4, anemia, Type 2 DM, AFib s/p ablation 6/2017, who presented with acute on chronic kidney disease and metabolic acidosis in the setting of possible cellulitis vs UTI as source.          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 Findings of volume overload, including small bilateral pleural  effusions, diffuse haziness of the mesentery, and diffuse anasarca.  Enteritis is also a diagnostic consideration given the mesentery  findings.  2. No bowel obstruction or features to suggest bowel ischemia on this  noncontrast examination. No pneumatosis or portal venous gas.  3. Mild urinary bladder wall thickening with subtle surrounding  inflammation, correlation with UA could be considered.  4. 5.4 x 6.1 x 9.7 cm simple attenuation fluid collection within the  subcutaneous tissues of the lateral left buttock. Superimposed  infection cannot be  excluded.  5. Cholelithiasis.         Consultations:   Nephrology  Vascular surgery  [facility]  Dermatology         Hospital Course by Problem:    # MARTÍN on CKD stage 4  Etiology of MARTÍN not entirely clear, however initially looked prerenal from sepsis. However, there may have also been a component of volume overload with weight up, so cardiorenal also possible. She was diuresed with lasix and is close to dry weight. Acidosis improving with sodium bicarb. Electrolytes look good. No acute need for HD.   - Nephrology f/u on 8/22/17  - Repeat BMP in 3-5 days  - Monitor I&Os and daily weights. If weight increases, then may need to restart lasix    # Sepsis 2/2 UTI vs cellulitis  # Bilateral LE cellulitis  # Urinary tract infection, uncomplicated  Had intermittent low BPs earlier in admission, along with end organ damage (MARTÍN). Urine cx growing E. Coli, Enterococcus, and Citrobacter. LE wound culture growing pseudomonas, serratia, enterococcus, coag negative staph, and klebsiella. CT of leg was negative for presence of deep tissue infection Was on zosyn and vancomycin (discontinued 8/16) initially transitioned to cefepime and ampicillin (discontinued 8/17), then transitioned to levofloxacin and amoxicillin.   - Continue levofloxacin  - Continue amoxicillin    # Lower extremity ulcers  Etiology likely venous stasis ulcer. Wound care was following, dermatology also evaluated patient.  There was some concern for arterial insufficiency, so vascular surgery was consulted. ABIs 8/16 however vessels non-compressable so toe-brachial index was done showed no mircovascular disease.   - Vascular surgery follow-up for outpatient TCO2 study  - Antibiotics as above given likelihood of infected ulcers  - Tramadol and acetaminophen PRN for pain  - Dermatology f/u in 4-6 weeks  - Continue pentoxifylline      # Hypertension  Home doses of blood pressure medications were restarted 8/17 with stable blood pressures.  - continue PTA amlodipine and  metoprolol    # Non-anion gap metabolic acidosis   On admission pH was 7.06. Initially there was concern that Kathryn would need dialysis, however with treatment of the infection with antibiotics, along with sodium bicarbonate, her pH has improved, most recently 7.3. Her baseline CO2 is 21.   - Continue sodium bicarbonate 1300 TID    # Vertical Diplopia   Patient with lens implants so with fluid shifts, this could be why she is having blurry vision or the implanted lenses could have shifted. This diplopia has been present for months and is unchanged here in the hospital.  - Opthalmology appointment as outpatient for further evaluation of implanted lenses    # HFpEF EF 60-65%  # CAD s/p CABD x5  Last echo was 5/31 with EF 60-65%, concentric LV hypertrophy, biatrial enlargement, and elevated RV pressures. BNP was ~ 40k on admission, however with her renal failure this makes it difficult to determine if exacerbation of heart failure.  - Daily weights and intake/output  - Continue holding lasix for now    # Hx of AFib s/p ablation 6/2017  Follows w/ Dr. King of cardiology, last seen on 8/10/17, at which time warfarin was stopped. EKG with sinus rhythm.   - continue PTA metoprolol 25 mg BID    # Small bilateral pleural effusions   No problems breathing, however small bilateral pleural effusions on CT. Likely etiology from MARTÍN and HFpEF. 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 Last HgbA1c 7.3, which is good control for her age and comorbidities  - Monitor  - Continue gabapentin for neuropathy    # Secondary hyperparathyroidism  # Vitamin D deficiency  , phos 6.1, Ca ionized 3.9, vit D 15  - Continue phoslo  - Continue cholecalciferol 2,000 units qday    # Chronic normocytic Anemia  Hgb 7.5 - 8 while here. Baseline 8-9 most recently. Does not appear overly iron deficient based on last iron panel. May have some degree of anemia from CKD  -  "Continue iron supplements  - F/u with nephrology    # Chronic diarrhea, unspecified  Per patient is chronic, with some blood from hemmheroids. Negative for c diff here. Diarrhea maybe somewhat worsened in the setting of antibiotics  - Continue probiotic, can stop 3-4 days after antibiotics are finished   - Could consider loperamide or other anti-diarrheal, if needed     # Gluteal cyst  Incidental finding on imaging, most likely cyst. General surgery evaluated patient and no indication for I&D    Physical Exam on day of Discharge:  Vital signs:  Temp: 95.7  F (35.4  C) Temp src: Oral BP: 151/80 Pulse: 73 Heart Rate: 76 Resp: 18 SpO2: 92 % O2 Device: None (Room air)     Weight: 82 kg (180 lb 12.8 oz)  Estimated body mass index is 31.03 kg/(m^2) as calculated from the following:    Height as of 8/10/17: 1.626 m (5' 4\").    Weight as of this encounter: 82 kg (180 lb 12.8 oz).    General: alert and no distress, laying in bed  Head: NC/AT  Eyes: non-icteric sclera, EOMI  Pulmonary: CTAB  CV: RRR, systolic ejection murmur 2/6  GI: soft, non-tender, non-distended + bowel sounds  Skin: Her shins and lower legs were covered in dressings/ACE wraps.   MSK: no deformities  EXT: Bilateral LE edema  Neuro: alert and oriented         Pending Results:   None         Discharge Medications:     Current Discharge Medication List      START taking these medications    Details   levofloxacin (LEVAQUIN) 250 MG tablet Take 1 tablet (250 mg) by mouth every 48 hours for 1 dose  Qty: 1 tablet, Refills: 0    Associated Diagnoses: Urinary tract infection without hematuria, site unspecified; Cellulitis of lower extremity, unspecified laterality      calcium acetate (PHOSLO) 667 MG CAPS capsule Take 1 capsule (667 mg) by mouth 3 times daily (with meals)  Qty: 180 capsule    Associated Diagnoses: Chronic kidney disease, unspecified CKD stage      pentoxifylline (TRENTAL) 400 MG CR tablet Take 1 tablet (400 mg) by mouth daily    Associated " Diagnoses: Venous stasis ulcer (H)      amoxicillin (AMOXIL) 500 MG capsule Take 1 capsule (500 mg) by mouth every 24 hours for 2 doses  Qty: 2 capsule, Refills: 0    Associated Diagnoses: Urinary tract infection without hematuria, site unspecified      cholecalciferol 2000 UNITS tablet Take 2,000 Units by mouth daily  Qty: 30 tablet    Associated Diagnoses: Vitamin D deficiency      Lactobacillus (ACIDOPHILUS) tablet Take 1 tablet by mouth daily    Associated Diagnoses: Urinary tract infection without hematuria, site unspecified; Cellulitis of lower extremity, unspecified laterality         CONTINUE these medications which have CHANGED    Details   traMADol (ULTRAM) 50 MG tablet Take 1-2 tablets ( mg) by mouth every 6 hours as needed for pain maximum 3 tablet(s) per day  Qty: 20 tablet, Refills: 0    Associated Diagnoses: Leg pain, bilateral      acetaminophen (TYLENOL) 325 MG tablet Take 2 tablets (650 mg) by mouth every 4 hours as needed for mild pain  Qty: 100 tablet    Associated Diagnoses: Leg pain, bilateral      aspirin 81 MG tablet Take 1 tablet (81 mg) by mouth daily  Qty: 30 tablet, Refills: 3    Associated Diagnoses: Coronary artery disease involving native coronary artery of native heart without angina pectoris      sodium bicarbonate 650 MG tablet Take 2 tablets (1,300 mg) by mouth 3 times daily    Associated Diagnoses: Chronic kidney disease, unspecified CKD stage      nitroGLYcerin (NITROSTAT) 0.4 MG sublingual tablet Place 1 tablet (0.4 mg) under the tongue every 5 minutes as needed for chest pain  Qty: 25 tablet, Refills: 0    Associated Diagnoses: Hx of non-ST elevation myocardial infarction (NSTEMI); Atherosclerosis of native coronary artery of native heart without angina pectoris; Postsurgical aortocoronary bypass status      gabapentin (NEURONTIN) 300 MG capsule Take 1 capsule (300 mg) by mouth At Bedtime  Qty: 90 capsule    Associated Diagnoses: Diabetic polyneuropathy associated with  type 2 diabetes mellitus (H)      pravastatin (PRAVACHOL) 40 MG tablet Take 1 tablet (40 mg) by mouth daily  Qty: 90 tablet, Refills: 3    Associated Diagnoses: Hx of non-ST elevation myocardial infarction (NSTEMI); Atherosclerosis of native coronary artery of native heart without angina pectoris; Type 2 diabetes mellitus with other circulatory complications (H)      metoprolol (LOPRESSOR) 50 MG tablet Take 0.5 tablets (25 mg) by mouth 2 times daily  Qty: 180 tablet, Refills: 3    Associated Diagnoses: Atrial fibrillation with rapid ventricular response (H)      amLODIPine (NORVASC) 5 MG tablet Take 2 tablets (10 mg) by mouth daily  Qty: 30 tablet, Refills: 3    Associated Diagnoses: Benign essential hypertension      cyanocobalamin (VITAMIN B12) 1000 MCG/ML injection Inject 1 mL (1,000 mcg) into the muscle every 30 days  Qty: 1 mL, Refills: 11    Associated Diagnoses: Vitamin B12 deficiency (non anemic)      ferrous sulfate (IRON) 325 (65 FE) MG tablet Take 1 tablet (325 mg) by mouth daily (with breakfast)  Qty: 100 tablet    Associated Diagnoses: Iron deficiency anemia secondary to inadequate dietary iron intake      calcium carbonate-vitamin D 500-400 MG-UNIT TABS per tablet Take 1 tablet by mouth daily  Qty: 60 tablet    Associated Diagnoses: Chronic kidney disease, unspecified CKD stage         CONTINUE these medications which have NOT CHANGED    Details   ORDER FOR DME Equipment being ordered: cpap machine, mask, humidifier, tubing and filters  Qty: 1 Device, Refills: 0    Associated Diagnoses: ANABEL (obstructive sleep apnea)         STOP taking these medications       furosemide (LASIX) 40 MG tablet Comments:   Reason for Stopping:         pramipexole (MIRAPEX) 0.5 MG tablet Comments:   Reason for Stopping:                    Discharge Instructions and Follow-Up:     Discharge Procedure Orders  US TCO2 Bilateral   Standing Status: Future  Standing Exp. Date: 08/16/18   Order Comments: Have you called the [facility]  Vascular Lab to schedule this exam?     Please call [phone].   Order Specific Question Answer Comments   Reason for exam: non-healing wounds      Home care nursing referral   Referral Type: Home Health Therapies & Aides     Medication Therapy Management Referral   Referral Type: Med Therapy Management     MD face to face encounter   Order Comments: Documentation of Face to Face and Certification for Home Health Services    I certify that patient: Kathryn Banks is under my care and that I, or a nurse practitioner or physician's assistant working with me, had a face-to-face encounter that meets the physician face-to-face encounter requirements with this patient on: 8/14/2017.    This encounter with the patient was in whole, or in part, for the following medical condition, which is the primary reason for home health care: complex medical status.    I certify that, based on my findings, the following services are medically necessary home health services: Nursing.    My clinical findings support the need for the above services because: Nurse is needed: assessment and treatment, medication management, VS.    Further, I certify that my clinical findings support that this patient is homebound (i.e. absences from home require considerable and taxing effort and are for medical reasons or [employer] services or infrequently or of short duration when for other reasons) because: Requires assistance of another person or specialized equipment to access medical services because patient: Requires supervision of another for safe transfer...    Based on the above findings. I certify that this patient is confined to the home and needs intermittent skilled nursing care, physical therapy and/or speech therapy.  The patient is under my care, and I have initiated the establishment of the plan of care.  This patient will be followed by a physician who will periodically review the plan of care.  Physician/Provider to provide follow up  care: Dheeraj Jj    Attending hospital physician (the Medicare certified [facility] provider):Dr. Rajan Epperson  Physician Signature: See electronic signature associated with these discharge orders.  Date: 8/14/2017     General info for SNF   Order Comments: Length of Stay Estimate: Short Term Care: Estimated # of Days <30  Condition at Discharge: Improving  Level of care:skilled   Rehabilitation Potential: Good  Admission H&P remains valid and up-to-date: Yes  Recent Chemotherapy: N/A  Use Nursing Home Standing Orders: Yes     Mantoux instructions   Order Comments: Give two-step Mantoux (PPD) Per Facility Policy Yes     Reason for your hospital stay   Order Comments: Acute kidney injury, urinary tract infection, cellulitis, venous stasis ulceration     Intake and output   Order Comments: Every shift     Daily weights   Order Comments: Call Provider for weight gain of more than 2 pounds per day or 5 pounds per week.     Wound care   Order Comments: Site:   Lower extremity ulcers  Instructions:  cleanse the wound with microklenz moisten gauze pat dry cut a piece of PolyMem roll dressing to fit the size of the wound and place over the wound and cover with / ABD pad and secure with Kerlix and wrap with ACe wraps change dressing daily and as needed if soiled     Additional Discharge Instructions   Order Comments: - Monitoring of weight and intake/output. If weight increasing by 3 pounds in day or 5 pounds in a week, then she likely needs to restart lasix (she was on 40mg daily previously), however would discuss this with nephrology     Activity - Up with assistive device   Order Specific Question Answer Comments   Is discharge order? Yes      Follow Up and recommended labs and tests   Order Comments: - BMP in 3-5 days  - Has nephrology follow-up on 8/22/17, she need to go to this appointment  - Dermatology follow-up in 4-6 weeks  - Vascular surgery follow-up, timing to be arranged by vascular surgery  - Please schedule  an appointment with primary care provider within a week of discharge from TCU  - Opthalmology referral placed, needs appointment within a month     Full Code     Physical Therapy Adult Consult   Order Comments: Evaluate and treat as clinically indicated.    Reason:  Deconditioning     Occupational Therapy Adult Consult   Order Comments: Evaluate and treat as clinically indicated.    Reason:  Deconditioning     Advance Diet as Tolerated   Order Comments: Follow this diet upon discharge: Orders Placed This Encounter     Renal Diet (non-dialysis)   Order Specific Question Answer Comments   Is discharge order? Yes              Discharge Disposition:   TCU         Condition on Discharge:   Discharge condition: Stable   Code status on discharge: Full Code        Date of service: 8/18/2017     35 minutes spent in discharge, including >50% in counseling and coordination of care, medication review and plan of care recommended on follow up.     Patient staffed with Dr. Lorenzo

## 2017-08-18 NOTE — PROGRESS NOTES
Nephrology Progress Note  08/18/2017         Kathryn Banks is a 77 year old year old female  With hx of CKD 4 , due to chronic diabetes and CAD, presented with painful leg ulcer and cellulitis, MARTÍN on CKD, decreased UO and worsened metabolic acidosis (admission bicarb 13). Baseline creat in the ~ 4.0 range  .    Assessment & Recommendations:       1. MARTÍN - Creat declining today to 5.0 from the mid 5 range throughout this admission.   Etiology felt to be ATN 2/2 infection, hypotension, CKD, Vanco. Pt has UTI with > 100 K Ecoli. BC have been neg. She also had hypotension on 8/12 and 8/13 with SBP 80's/. Baseline creat ~ 4.0   - No acute indication for dialysis. GFR is unchanged this admission.    - Has outpt appt scheduled 8/22/17 with Dr Barfield. She will need transportation from TCU to her appt    - Continue to monitor for recovery with daily chemistry labs   - Avoid nephrotoxins and hypotension   - Renal dose medications ( Vanc level is 29.8!)      2. CKD3/4 - Etiology felt to be DM, HTN, previous MARTÍN events and possibly some component of CRS. Patient follows with Dr Barfield in [place].       3. Volume status - Volume status is improving. Remains hypervolemic with edema. Her edema is mostly third spaced. No hypoxia, dyspnea. Albumin is 1.6. She is non oliguric with  cc last 24 hrs and 575 cc in stool output. -150/. Admission weight 83.3 kg. Weight today 82 kg.    - OK to resume Lasix today   - Daily standing weights please   - Continue to monitor I/O      4. HTN - Controlled. -150/. PTA meds have been gradually reintroduced after being held for hypotension earlier this admission. PTA antihypertensives include:  Lopressor 25 mg bid, Amlodipine 10 mg qd, Lasix 40 mg qd.     - Restart Lasix 40 mg po qd at discharge.    - Avoid hypotension in MARTÍN. Would allow blood pressure to drift into the 140/ range       5. Electrolytes - Continues with intermit hypernatremia. Na 147. K 4.1   - No fluid  restriction recommended   - Encourage free water consumption.       6. Acid base - Metabolic Acidosis due to chronic diarrhea/MARTÍN/CKD. Currently bicarb 19   - Continue Bicarb 1300 mg TID       7. BMD - Ionized Ca 4.0, Phos 5.1   - Started on Phoslo 667 mg TID with meals on 8/13/17   - It is very difficult for patients to take binders in the hospital given timing of meds/food, etc. Would not change at this time. Can be reassessed as outpt.       8. Anemia - Hgb 8.0. Iron studies 8/13/17: Fe 20, Ferritin 188, Tsat 17%. On oral iron   - Patient is enrolled in anemia management services through Nephrology clinic      9. Antimicrobials - On Po Levaquin and Amox.  Vanco discontinued. Vanco level 29.8 on 8/17. WBC 6.4, Afebrile. BC neg. UC > 100 K Ecoli. Wound cx + pseudomonas.       10. Leg lesions - Derm saw patient and diagnosed with lipodermatosclerosis. No bx was nec.        11. Nutrition - Tolerating diet, but appears significantly malnourished with albumin of 1.6.     12. Cognitive impairment - I don't know this patient at baseline but she is impaired. Unclear if this is acute delirium or if she has mild dementia. Nursing notes that she is only using Tylenol. D/W Primary team   - Would recommend Cognitive Eval at TCU      Recommendations were communicated to primary team directly      Seen and discussed with Dr. Lux Rodas, NP   [phone]      Interval History :   Creat continues to decline  She is non oliguric  Have reviewed daily with the patient the nature of her MARTÍN and she is unable to repeat even after I have just finished the explanation.       Review of Systems:       I reviewed the following systems:  GI: Reports good appetite. Having ongoing diarrhea   Neuro:  confused  Constitutional:  no fever or chills  CV: denies dyspnea or CP. Has edema  : Voiding w/o moreira    Physical Exam:   I/O last 3 completed shifts:  In: 1480 [P.O.:1480]  Out: 1375 [Urine:800; Stool:575]   /80 (BP  Location: Right arm)  Pulse 73  Temp 95.7  F (35.4  C) (Oral)  Resp 18  Wt 82 kg (180 lb 12.8 oz)  SpO2 92%  BMI 31.03 kg/m2     GENERAL APPEARANCE: Comfortable, NAD  EYES:  no scleral icterus, pupils equal  PULM: lungs CTA. Breathing is non labored. Not on supplemental oxygen.   CV: RRR      -edema- multiple skin ulcers on both legs. LE edema persists  GI: soft, NT, non distended   : Voiding w/o moreira  NEURO:  Alert, interactive    Labs:   All labs reviewed by me  Electrolytes/Renal -   Recent Labs   Lab Test  08/18/17   0747  08/17/17   0702  08/16/17   0430  08/15/17   0436  08/14/17   0817  08/13/17   0735   NA  147*  145*  147*  148*  147*   --    POTASSIUM  4.1  3.9  3.8  4.4  4.9   --    CHLORIDE  116*  116*  118*  119*  119*   --    CO2  19*  20  18*  18*  16*   --    BUN  40*  41*  42*  45*  48*   --    CR  5.08*  5.25*  5.42*  5.51*  5.49*   --    GLC  147*  150*  114*  120*  96   --    MALICK  6.4*  6.2*  6.4*  6.7*  6.5*   --    MAG   --    --   1.8   --   2.0  1.9   PHOS  5.1*   --   5.7*  6.1*  6.1*  6.4*       CBC -   Recent Labs   Lab Test  08/18/17   0747  08/17/17   0702  08/16/17   0430   WBC  6.4  6.3  7.4   HGB  8.0*  7.4*  7.4*   PLT  205  217  207       LFTs -   Recent Labs   Lab Test  08/16/17   0430  08/12/17   1616  08/12/17   1013   07/07/17   0616   ALKPHOS   --   216*  228*   --   197*   BILITOTAL   --   0.2  0.2   --   0.7   ALT   --   44  49   --   35   AST   --   32  45   --   18   PROTTOTAL   --   4.7*  5.0*   --   5.5*   ALBUMIN  1.6*  1.9*  2.1*   < >  2.7*    < > = values in this interval not displayed.       Iron Panel -   Recent Labs   Lab Test  08/13/17   0552  07/17/17   1054  06/21/17   0842   IRON  20*  23*  36   IRONSAT  17 16 17   GWEN  188  206  196       Current Medications:    lactobacillus rhamnosus (GG)  1 capsule Oral Daily     amoxicillin  500 mg Oral Q24H     [START ON 8/19/2017] levofloxacin  250 mg Oral Q48H     amLODIPine  10 mg Oral Daily     metoprolol   25 mg Oral BID     pentoxifylline  400 mg Oral Daily     sodium bicarbonate  1,300 mg Oral TID     aspirin EC  81 mg Oral Daily     calcium carbonate  1,250 mg Oral Daily     cholecalciferol  2,000 Units Oral Daily     ferrous sulfate  325 mg Oral Daily     calcium acetate  667 mg Oral TID w/meals     gabapentin  300 mg Oral At Bedtime        Elsa Rodas, NP

## 2017-08-18 NOTE — PLAN OF CARE
Problem: Goal Outcome Summary  Goal: Goal Outcome Summary  Admitted w/ MARTÍN, cellulitis, and positive UTI. Home CPAP for HS. Positive BCs. A&Ox4. A1 with walker. Renal diet. Strict I&O. R PIV SL. PT/OT. Wound cares completed. On PO Amoxicillin. Creat trending down. Discharge today to TCU @ [facility] @ 1330. Continue to monitor and follow the POC.

## 2017-08-18 NOTE — PROGRESS NOTES
Social Work Services Discharge Note      Patient Name:  Kathryn Banks     Anticipated Discharge Date:  8/18/2017    Discharge Disposition:   TCU:  [facility] T: [phone], F: [phone], admissions: [phone]  SW faxed DC orders and summary     Pre-Admission Screening (PAS) online form has been completed.  The Level of Care (LOC) is:  Determined  Confirmation Code is:  [id number]  Patient/caregiver informed of referral to [facility] for Pre-Admission Screening for skilled nursing facility (SNF) placement and to expect a phone call post discharge from SNF.     Additional Services/Equipment Arranged:  NA, spouse will transport     Patient / Family response to discharge plan:  in agreement     Persons notified of above discharge plan:  Patient, Spouse, Medicine, admissions, bedside Rn    Staff Discharge Instructions:  Please fax discharge orders and signed hard scripts for any controlled substances.  Please print a packet and send with patient.     CTS Handoff completed:  YES    Medicare Notice of Rights provided to the patient/family:  YES    SHANTELL Mtz  5B  (Medical/Surgical)  Phone: [phone]  Pager: [phone]

## 2017-08-19 LAB
BACTERIA SPEC CULT: ABNORMAL
Lab: ABNORMAL
SPECIMEN SOURCE: ABNORMAL

## 2017-08-21 ENCOUNTER — NURSING HOME VISIT (OUTPATIENT)
Dept: GERIATRICS | Facility: CLINIC | Age: 77
End: 2017-08-21
Payer: COMMERCIAL

## 2017-08-21 VITALS
OXYGEN SATURATION: 95 % | DIASTOLIC BLOOD PRESSURE: 71 MMHG | SYSTOLIC BLOOD PRESSURE: 120 MMHG | HEART RATE: 85 BPM | BODY MASS INDEX: 31.55 KG/M2 | RESPIRATION RATE: 18 BRPM | WEIGHT: 183.8 LBS | TEMPERATURE: 98.2 F

## 2017-08-21 DIAGNOSIS — A41.9 SEPSIS, DUE TO UNSPECIFIED ORGANISM: ICD-10-CM

## 2017-08-21 DIAGNOSIS — R53.81 PHYSICAL DECONDITIONING: ICD-10-CM

## 2017-08-21 DIAGNOSIS — J90 PLEURAL EFFUSION: ICD-10-CM

## 2017-08-21 DIAGNOSIS — E87.20 METABOLIC ACIDOSIS: ICD-10-CM

## 2017-08-21 DIAGNOSIS — K52.9 CHRONIC DIARRHEA: ICD-10-CM

## 2017-08-21 DIAGNOSIS — N18.4 ANEMIA OF CHRONIC RENAL FAILURE, STAGE 4 (SEVERE) (H): ICD-10-CM

## 2017-08-21 DIAGNOSIS — G47.01 INSOMNIA DUE TO MEDICAL CONDITION: ICD-10-CM

## 2017-08-21 DIAGNOSIS — N18.4 ACUTE RENAL FAILURE SUPERIMPOSED ON STAGE 4 CHRONIC KIDNEY DISEASE, UNSPECIFIED ACUTE RENAL FAILURE TYPE (H): Primary | ICD-10-CM

## 2017-08-21 DIAGNOSIS — I48.20 CHRONIC ATRIAL FIBRILLATION (H): ICD-10-CM

## 2017-08-21 DIAGNOSIS — E11.59 TYPE 2 DIABETES MELLITUS WITH OTHER CIRCULATORY COMPLICATIONS (H): ICD-10-CM

## 2017-08-21 DIAGNOSIS — I50.32 CHRONIC DIASTOLIC CONGESTIVE HEART FAILURE (H): ICD-10-CM

## 2017-08-21 DIAGNOSIS — L03.119 CELLULITIS OF LOWER EXTREMITY, UNSPECIFIED LATERALITY: ICD-10-CM

## 2017-08-21 DIAGNOSIS — D63.1 ANEMIA OF CHRONIC RENAL FAILURE, STAGE 4 (SEVERE) (H): ICD-10-CM

## 2017-08-21 DIAGNOSIS — H53.2 VERTICAL DIPLOPIA: ICD-10-CM

## 2017-08-21 DIAGNOSIS — L97.909 ULCER OF LOWER LIMB, UNSPECIFIED LATERALITY, WITH UNSPECIFIED SEVERITY (H): ICD-10-CM

## 2017-08-21 DIAGNOSIS — I10 ESSENTIAL HYPERTENSION: ICD-10-CM

## 2017-08-21 DIAGNOSIS — N17.9 ACUTE RENAL FAILURE SUPERIMPOSED ON STAGE 4 CHRONIC KIDNEY DISEASE, UNSPECIFIED ACUTE RENAL FAILURE TYPE (H): Primary | ICD-10-CM

## 2017-08-21 DIAGNOSIS — R30.0 DYSURIA: ICD-10-CM

## 2017-08-21 DIAGNOSIS — I25.10 ATHEROSCLEROSIS OF NATIVE CORONARY ARTERY OF NATIVE HEART WITHOUT ANGINA PECTORIS: ICD-10-CM

## 2017-08-21 PROCEDURE — 99310 SBSQ NF CARE HIGH MDM 45: CPT | Performed by: NURSE PRACTITIONER

## 2017-08-21 RX ORDER — CYANOCOBALAMIN 1000 UG/ML
1 INJECTION, SOLUTION INTRAMUSCULAR; SUBCUTANEOUS
COMMUNITY
End: 2017-10-20

## 2017-08-21 NOTE — PROGRESS NOTES
Clinic Care Coordination Contact  Care Coordination Transition Communication    Referral Source: CTS    Clinical Data: Patient was hospitalized at [facility]  from 8/12 to 8/18 with diagnosis of Metabolic Acidosis.     Transition to Facility:              Facility Name: [facility]              Contact name and phone number/fax: [phone]    Plan: RN/SW Care Coordinator will await notification from facility staff informing RN/SW Care Coordinator of patient's discharge plans/needs. RN/SW Care Coordinator will review chart and outreach to facility staff every 4 weeks and as needed.     Meenakshi Larsen RN, BSN  Care Coordination    [address] [address]  Office: [phone]  Fax [phone]   [email or web address]   [email or web address]     Connect with [facility] on social media.

## 2017-08-21 NOTE — PROGRESS NOTES
[facility]  PRIMARY CARE PROVIDER AND CLINIC:  [facility] [address] [facility]  / JEFFERY KRAMER 5*  Chief Complaint   Patient presents with     Hospital F/U       HPI:    Kathryn Banks is a 77 year old  (1940),admitted to the [facility] from [facility].  Hospital stay 8/12/17 through 8/18/17.  Admitted to this facility for  rehab, medical management and nursing care.  HPI information obtained from: facility chart records, facility staff, patient report and [facility] chart review.        Current issues are:         Acute renal failure superimposed on stage 4 chronic kidney disease, unspecified acute renal failure type (H)  Metabolic acidosis  Sepsis, due to unspecified organism (H)  Cellulitis of lower extremity, unspecified laterality  Dysuria  Ulcer of lower limb, unspecified laterality, with unspecified severity (H)  Physical deconditioning  Type 2 diabetes mellitus with other circulatory complications (H)  Chronic diastolic congestive heart failure (H)  Atherosclerosis of native coronary artery of native heart without angina pectoris  Essential hypertension  Chronic atrial fibrillation (H)  Vertical diplopia  Pleural effusion  Anemia of chronic renal failure, stage 4 (severe) (H)  Chronic diarrhea  Insomnia due to medical condition     Pty is a pleasant 77 year old woman with a PMH significant for CAD s/p 5vCABG, CHF, CKD4 with MARTÍN, anemia, a fib s/p ablation, DM2 who presented to hospital with worsening kidney function and metabolic acidosis from potential cellulitis vs UTI.     While in the hospital she was evaluated and ultimately, no HD was performed.  Her acidosis was improving with prescribed sodium bicarb.  She was treated for potential sepsis (UTI vs cellulitis) with IV Zosyn and Vanco --> transitioned to cefepime and ampicillin --> transitioned to levofloxacin and amoxicillin.  Vascular Surgery evaluated her  LEs for PVD changes and noted no microvascular disease, so dermatology was ordered in 4-6 weeks.  She was sent to this TCU for medical management, nursing assistance, and rehab with Physical Therapy, Occupational Therapy.     She is met today in her room at [facility] TCU.  She denies CP, fever/chills, heartburn, upset stomach and reports a good appetite.  She endorses painful leg ulcers, slight SOB, some diarrhea, insomnia.     CODE STATUS/ADVANCE DIRECTIVES DISCUSSION:   CPR/Full code   Patient's living condition: with her spouse, granddaughter and 2 friends in a home    ALLERGIES:Ciprofloxacin; Oxycodone; Lisinopril; Penicillins; and Sulfa drugs  PAST MEDICAL HISTORY:  has a past medical history of Carpal tunnel syndrome; Coronary atherosclerosis of native coronary artery (2006); OBSTRUCTIVE SLEEP APNEA; Osteoarthrosis, unspecified whether generalized or localized, unspecified site; Other and combined forms of senile cataract (2000); Other and unspecified hyperlipidemia; RESTLESS LEGS SYNDROME; TENOSYNOV HAND/WRIST NEC (6/30/2006); Type II or unspecified type diabetes mellitus without mention of complication, not stated as uncontrolled (2001); Typical atrial flutter (H) (01/17/2007); and Unspecified essential hypertension.  PAST SURGICAL HISTORY:  has a past surgical history that includes TOTAL ABDOM HYSTERECTOMY (1995); REMOVAL OF OVARY/TUBE(S); APPENDECTOMY; REVISE MEDIAN N/CARPAL TUNNEL SURG; SOTO W/O FACETEC FORAMOT/DSKC 1/2 VRT SEG, LUMBAR (1968); VAGOTOMY/PYLOROPLASTY,SELECT (1970); REMV CATARACT INTRACAP,INSERT LENS; COLONOSCOPY THRU STOMA, DIAGNOSTIC (10/7/04); endoscopy (03/21/2000); Colonoscopy w/wo Collins **Performed** (10/31/07); UGI ENDOSCOPY DIAG W BIOPSY (10/31/07); colonoscopy (12/3/2007); UPPER GI ENDOSCOPY,EXAM (12/3/2007); cystoscopy (11/25/09); angioplasty; CABG, VEIN, FIVE (12/06); Colonoscopy (1/17/2014); and Open reduction internal fixation hip nailing (Left, 7/3/2016).  FAMILY HISTORY:  family history includes Blood Disease in her father; DIABETES in her father, maternal grandmother, and paternal grandmother; Neurologic Disorder in her father. There is no history of Hypertension or CEREBROVASCULAR DISEASE.  SOCIAL HISTORY:  reports that she quit smoking about 48 years ago. She quit after 10.00 years of use. She has never used smokeless tobacco. She reports that she drinks alcohol. She reports that she does not use illicit drugs.    Post Discharge Medication Reconciliation Status: discharge medications reconciled, continue medications without change.  Current Outpatient Prescriptions   Medication Sig Dispense Refill     ACETAMINOPHEN PO Take 650 mg by mouth every 4 hours as needed for pain For pain 1-5 out of 10       TRAMADOL HCL PO Take 50 mg by mouth every 6 hours as needed for moderate to severe pain Pain of 6-10 out of 10       cyanocobalamin (VITAMIN B12) 1000 MCG/ML injection Inject 1 mL into the muscle every 30 days       aspirin 81 MG tablet Take 1 tablet (81 mg) by mouth daily 30 tablet 3     sodium bicarbonate 650 MG tablet Take 2 tablets (1,300 mg) by mouth 3 times daily       nitroGLYcerin (NITROSTAT) 0.4 MG sublingual tablet Place 1 tablet (0.4 mg) under the tongue every 5 minutes as needed for chest pain 25 tablet 0     gabapentin (NEURONTIN) 300 MG capsule Take 1 capsule (300 mg) by mouth At Bedtime 90 capsule      pravastatin (PRAVACHOL) 40 MG tablet Take 1 tablet (40 mg) by mouth daily 90 tablet 3     metoprolol (LOPRESSOR) 50 MG tablet Take 0.5 tablets (25 mg) by mouth 2 times daily 180 tablet 3     amLODIPine (NORVASC) 5 MG tablet Take 2 tablets (10 mg) by mouth daily 30 tablet 3     ferrous sulfate (IRON) 325 (65 FE) MG tablet Take 1 tablet (325 mg) by mouth daily (with breakfast) 100 tablet      calcium acetate (PHOSLO) 667 MG CAPS capsule Take 1 capsule (667 mg) by mouth 3 times daily (with meals) 180 capsule      pentoxifylline (TRENTAL) 400 MG CR tablet Take 1 tablet (400  mg) by mouth daily       cholecalciferol 2000 UNITS tablet Take 2,000 Units by mouth daily 30 tablet      calcium carbonate-vitamin D 500-400 MG-UNIT TABS per tablet Take 1 tablet by mouth daily 60 tablet      Lactobacillus (ACIDOPHILUS) tablet Take 1 tablet by mouth daily       ORDER FOR DME Equipment being ordered: cpap machine, mask, humidifier, tubing and filters 1 Device 0       ROS:  10 point ROS of systems including Constitutional, Eyes, Respiratory, Cardiovascular, Gastroenterology, Genitourinary, Integumentary, Muscularskeletal, Psychiatric were all negative except for pertinent positives noted in my HPI.    Exam:  /71  Pulse 85  Temp 98.2  F (36.8  C)  Resp 18  Wt 183 lb 12.8 oz (83.4 kg)  SpO2 95%  BMI 31.55 kg/m2  GENERAL APPEARANCE:  Alert, in no distress  RESP:  respiratory effort and palpation of chest normal, auscultation of lungs clear with diminished bases, no respiratory distress  CV:  Palpation and auscultation of heart done , rate and rhythm regular, no murmur, mild to moderate LE peripheral edema, PVD changes present (cool, no hair, brownish in color, reduced pulses)  ABDOMEN:  normal bowel sounds, soft, nontender, no hepatosplenomegaly or other masses  M/S:   Gait and station today with WC mobility (has 4WW in room), Digits and nails with arthritic changes, left hand swollen slightly, reduced muscle mass  SKIN:  Inspection and Palpation of skin and subcutaneous tissue with venous stasis ulcers on LEs (11 on right, 4 on LLE) - many with slough present.  Drainage serous at this time.  Skin with PVD changes (cool, brown, no hair, reduced pulses).    PSYCH:  insight and judgement, memory intact, affect and mood normal, follows commands readily         Lab/Diagnostic data:   CBC RESULTS:   Recent Labs   Lab Test  08/18/17   0747  08/17/17   0702   WBC  6.4  6.3   RBC  3.22*  3.04*   HGB  8.0*  7.4*   HCT  26.3*  24.8*   MCV  82  82   MCH  24.8*  24.3*   MCHC  30.4*  29.8*   RDW  22.6*   22.7*   PLT  205  217       Last Basic Metabolic Panel:  Recent Labs   Lab Test  08/18/17   0747  08/17/17   0702   NA  147*  145*   POTASSIUM  4.1  3.9   CHLORIDE  116*  116*   MALICK  6.4*  6.2*   CO2  19*  20   BUN  40*  41*   CR  5.08*  5.25*   GLC  147*  150*       Liver Function Studies -   Recent Labs   Lab Test  08/16/17   0430  08/12/17   1616  08/12/17   1013   PROTTOTAL   --   4.7*  5.0*   ALBUMIN  1.6*  1.9*  2.1*   BILITOTAL   --   0.2  0.2   ALKPHOS   --   216*  228*   AST   --   32  45   ALT   --   44  49       TSH   Date Value Ref Range Status   04/28/2017 0.75 0.40 - 4.00 mU/L Final   01/10/2017 1.03 0.40 - 4.00 mU/L Final   ]    Lab Results   Component Value Date    A1C 7.3 06/22/2017    A1C 6.2 03/31/2017       ASSESSMENT/PLAN:  Acute renal failure superimposed on stage 4 chronic kidney disease, unspecified acute renal failure type (H)  Metabolic acidosis  Presented to the hospital with worsening labs.  Cardiorenal vs sepsis - exacerbation  Diuresed with Lasix and acidosis improved with NaHCO3 1300 mg TID  Per records - baseline CO2 is 21 - was 19 on D/C  Presented with pH of 7.06 - improved with NaHCO3 and resolution of infection. (pH 7.3 on D/C)    No HD done while in-patient.     Admit to Hosp: BUN 40, Creat 5.08, GFR 8  D/C from hosp: BUN 43, Creat 5/13, GFR 8    Nursing noting patient is drinking a large volume of soda - discussed po intake with patient and recommended less soda intake, less overall intake despite thirst urge. Interventions to combat thirst discussed.     PLAN:  Patient has 8/22/17 f/u with Nephrology (Dr. Barfield, [facility]) and it is highly recommended she make this appointment.  Patient noted she does not want HD but would consider it vs palliative/hospice.     Continue NaHCO3 1300mg TID and monitor CO2.   Likely Nephrology will order labs at tomorrow's visit so will hold off on ordering labs at this time.      Sepsis (H)  Cellulitis  Dysuria  IV ANTIBIOTICS: Vano  and Zosyn -->cefepime and ampicillin --> levofloxacin and amoxicillin at TCU, now complete.     CT of legs negative for deep tissue infection.   WBC 6.4 on D/C from hospital - will monitor.   Patient denies symptoms of urine infection at today's visit.     Ulcer of lower limb (H)  Likely venous stasis ulcers. Vascular Surgery consulted and noted ultimately no microvascular disease.    Wound Care and Dermatology following  Previously on antibiotics for cellulitis vs UTI  Patient today c/o pain in her legs from ulcers - analgesia with PRN Tylenol which she reports relief from.  Also has PRN Tramadol ordered.     Significant areas of wounds - 11 on RLE, 4 on LLE.     PLAN:  Patient noted relief from Tylenol - will order for pain 1-5, Tramadol if pain increases (DC 10 mg range on order)  Continue pentoxifylline 400 gm daily  Dermatology f/u in 4-6 weeks  Will monitor for infection now that antibiotics are completed.  Will order santyl for slough in wound bed  Will monitor closely for need to consult wound clinic.     Physical deconditioning  Multiple hospitalizations and TCU stays  Physical Therapy/ Occupational Therapy for rehab  Will monitor progress.     Type 2 diabetes mellitus with other circulatory complications (H)  Lab Results   Component Value Date    A1C 7.3 06/22/2017    A1C 6.2 03/31/2017    A1C 6.6 07/03/2016    A1C 7.1 04/12/2016    A1C 6.0 06/08/2015   On no meds for DM2  On ASA, statin, no ACEI d/t CKD4.     Patient noted neuropathy in bilat heels that are intermittent, usually occurring when in bed.     Chronic diastolic congestive heart failure (H)  Atherosclerosis of native coronary artery of native heart without angina pectoris s/p 5vCABG  5/31/17 ECHO with EF 60-65%, LV hypertrophy, biatrial enlargement, elevated RV pressures. BNP reported around 40K on admission.   Lasix DC'd in the hospital - remains off.     Weights:  8/19 - 184  8/20 - 182  Will monitor weights.     LS clear  Mild to moderate LE  "edema today.     On ASA 81 mg daily, prevastatin 40 mg daily.     Essential hypertension  On PTA amlodipine 10 mg daily and Metoprolol 25 mg BID,   BPs 120-160s/70-80s  HRs 70-80s  Patient denies CP, HA; endorses some lightheadedness.     Will monitor for titration needs.     Chronic atrial fibrillation (H) on 6/12-6/13  F/y Dr. King, Cardiology (last visit 8/10/17)  S/p ablation 6/2017  Coumadin stopped at 8/10/17 visit.   NSR during hospitalization    HR regular today, 70-80s  On Metoprolol 25 mg BID for rate control. (has ASA 81 mg daily)    Vertical diplopia  Patient reports double vision tim constantly  Patient had to cancel recent Opthalmology appt d/t in-patient status.  Patient reports she is making another Opthalmology appt.     Pleural effusion  Reported on DC summary  LS clear today with dim bases  Patient reports \"a little\" SOB  Sats 95-99% on RA  Likely in the presence of fluid overload.   Will monitor.     Anemia of chronic renal failure, stage 4 (severe) (H)  Baseline Hgb 8-9  8.0 on DC from the hospital  Patient endorse fatigue.   On Fe supplements,  Has Nephrology appt tomorrow.     Chronic diarrhea  Likely worsened in presence of antibiotics (completed now)  C Dif neg in hosp.  Will monitor for need for Imodium      Insomnia due to medical condition  Patient reports difficult staying asleep, noting she wakes up and her mind starts to race.  Discussion regarding non-pharm interventions and pharm-interventions.  Patient declined meds  Will monitor for need.        Orders:  1. Santyl to open wounds with slough tissue BID  2. Adaptic over santyl, Change Bid with O/sg Changes  3. ABD dressing over adaptic then wrap in Kerlix and ace wrap  4. Change Tramadol to 50mg Q6hrs PRN PO Dx Pain 6-10  5. Change Tylenol PRN for pain 1-5  6. D/C Probiotics on 8/25/17  7. Cynobalmin 1000mg IM Q30 days. Please give now/as soon as availible.     Electronically signed by:  RHODA Bowden CNP                "

## 2017-08-21 NOTE — TELEPHONE ENCOUNTER
Kathryn is now in TCU. F/u with anemia management when discharged.    Call date: 8/23    Yesy Samaniego, PharmD, BCACP  [phone]  August 21, 2017

## 2017-08-22 ENCOUNTER — CARE COORDINATION (OUTPATIENT)
Dept: CARE COORDINATION | Facility: CLINIC | Age: 77
End: 2017-08-22

## 2017-08-22 ENCOUNTER — OFFICE VISIT (OUTPATIENT)
Dept: NEPHROLOGY | Facility: CLINIC | Age: 77
End: 2017-08-22
Payer: COMMERCIAL

## 2017-08-22 ENCOUNTER — TELEPHONE (OUTPATIENT)
Dept: NEPHROLOGY | Facility: CLINIC | Age: 77
End: 2017-08-22

## 2017-08-22 ENCOUNTER — TELEPHONE (OUTPATIENT)
Dept: PHARMACY | Facility: OTHER | Age: 77
End: 2017-08-22

## 2017-08-22 VITALS
WEIGHT: 188.1 LBS | OXYGEN SATURATION: 96 % | DIASTOLIC BLOOD PRESSURE: 72 MMHG | SYSTOLIC BLOOD PRESSURE: 129 MMHG | BODY MASS INDEX: 32.29 KG/M2 | HEART RATE: 79 BPM | TEMPERATURE: 97.8 F

## 2017-08-22 DIAGNOSIS — N18.4 CKD (CHRONIC KIDNEY DISEASE) STAGE 4, GFR 15-29 ML/MIN (H): Primary | ICD-10-CM

## 2017-08-22 DIAGNOSIS — I10 ESSENTIAL HYPERTENSION WITH GOAL BLOOD PRESSURE LESS THAN 140/90: ICD-10-CM

## 2017-08-22 DIAGNOSIS — E11.59 TYPE 2 DIABETES MELLITUS WITH OTHER CIRCULATORY COMPLICATIONS (H): ICD-10-CM

## 2017-08-22 DIAGNOSIS — N18.4 STAGE 4 CHRONIC KIDNEY DISEASE (H): ICD-10-CM

## 2017-08-22 DIAGNOSIS — N18.4 ANEMIA OF CHRONIC RENAL FAILURE, STAGE 4 (SEVERE) (H): ICD-10-CM

## 2017-08-22 DIAGNOSIS — D63.1 ANEMIA OF CHRONIC RENAL FAILURE, STAGE 4 (SEVERE) (H): ICD-10-CM

## 2017-08-22 DIAGNOSIS — N18.4 CKD (CHRONIC KIDNEY DISEASE) STAGE 4, GFR 15-29 ML/MIN (H): ICD-10-CM

## 2017-08-22 LAB
ALBUMIN SERPL-MCNC: 2 G/DL (ref 3.4–5)
ALBUMIN UR-MCNC: 10 MG/DL
ANION GAP SERPL CALCULATED.3IONS-SCNC: 9 MMOL/L (ref 3–14)
APPEARANCE UR: ABNORMAL
BACTERIA #/AREA URNS HPF: ABNORMAL /HPF
BILIRUB UR QL STRIP: NEGATIVE
BUN SERPL-MCNC: 30 MG/DL (ref 7–30)
CALCIUM SERPL-MCNC: 6.3 MG/DL (ref 8.5–10.1)
CHLORIDE SERPL-SCNC: 118 MMOL/L (ref 94–109)
CO2 SERPL-SCNC: 20 MMOL/L (ref 20–32)
COLOR UR AUTO: ABNORMAL
CREAT SERPL-MCNC: 4.48 MG/DL (ref 0.52–1.04)
CREAT UR-MCNC: 36 MG/DL
ERYTHROCYTE [DISTWIDTH] IN BLOOD BY AUTOMATED COUNT: 22.3 % (ref 10–15)
FERRITIN SERPL-MCNC: 242 NG/ML (ref 8–252)
GFR SERPL CREATININE-BSD FRML MDRD: 10 ML/MIN/1.7M2
GLUCOSE SERPL-MCNC: 257 MG/DL (ref 70–99)
GLUCOSE UR STRIP-MCNC: 150 MG/DL
HCT VFR BLD AUTO: 25.8 % (ref 35–47)
HGB BLD-MCNC: 7.8 G/DL (ref 11.7–15.7)
HGB UR QL STRIP: ABNORMAL
IRON SATN MFR SERPL: 19 % (ref 15–46)
IRON SERPL-MCNC: 26 UG/DL (ref 35–180)
KETONES UR STRIP-MCNC: NEGATIVE MG/DL
LEUKOCYTE ESTERASE UR QL STRIP: ABNORMAL
MCH RBC QN AUTO: 25.2 PG (ref 26.5–33)
MCHC RBC AUTO-ENTMCNC: 30.2 G/DL (ref 31.5–36.5)
MCV RBC AUTO: 83 FL (ref 78–100)
NITRATE UR QL: NEGATIVE
NON-SQ EPI CELLS #/AREA URNS LPF: ABNORMAL /LPF
PH UR STRIP: 5 PH (ref 5–7)
PHOSPHATE SERPL-MCNC: 4.8 MG/DL (ref 2.5–4.5)
PLATELET # BLD AUTO: 167 10E9/L (ref 150–450)
POTASSIUM SERPL-SCNC: 4.6 MMOL/L (ref 3.4–5.3)
PROT UR-MCNC: 0.45 G/L
PROT/CREAT 24H UR: 1.24 G/G CR (ref 0–0.2)
RBC # BLD AUTO: 3.1 10E12/L (ref 3.8–5.2)
RBC #/AREA URNS AUTO: ABNORMAL /HPF
SODIUM SERPL-SCNC: 147 MMOL/L (ref 133–144)
SOURCE: ABNORMAL
SP GR UR STRIP: 1.01 (ref 1–1.03)
TIBC SERPL-MCNC: 136 UG/DL (ref 240–430)
UROBILINOGEN UR STRIP-MCNC: NORMAL MG/DL (ref 0–2)
WBC # BLD AUTO: 9.3 10E9/L (ref 4–11)
WBC #/AREA URNS AUTO: ABNORMAL /HPF
YEAST #/AREA URNS HPF: ABNORMAL /HPF

## 2017-08-22 PROCEDURE — 83540 ASSAY OF IRON: CPT | Performed by: INTERNAL MEDICINE

## 2017-08-22 PROCEDURE — 82728 ASSAY OF FERRITIN: CPT | Performed by: INTERNAL MEDICINE

## 2017-08-22 PROCEDURE — 99214 OFFICE O/P EST MOD 30 MIN: CPT | Performed by: INTERNAL MEDICINE

## 2017-08-22 PROCEDURE — 80069 RENAL FUNCTION PANEL: CPT | Performed by: INTERNAL MEDICINE

## 2017-08-22 PROCEDURE — 85027 COMPLETE CBC AUTOMATED: CPT | Performed by: INTERNAL MEDICINE

## 2017-08-22 PROCEDURE — 81001 URINALYSIS AUTO W/SCOPE: CPT | Performed by: INTERNAL MEDICINE

## 2017-08-22 PROCEDURE — 86225 DNA ANTIBODY NATIVE: CPT | Performed by: INTERNAL MEDICINE

## 2017-08-22 PROCEDURE — 84156 ASSAY OF PROTEIN URINE: CPT | Performed by: INTERNAL MEDICINE

## 2017-08-22 PROCEDURE — 82595 ASSAY OF CRYOGLOBULIN: CPT | Performed by: INTERNAL MEDICINE

## 2017-08-22 PROCEDURE — 83550 IRON BINDING TEST: CPT | Performed by: INTERNAL MEDICINE

## 2017-08-22 PROCEDURE — 36415 COLL VENOUS BLD VENIPUNCTURE: CPT | Performed by: INTERNAL MEDICINE

## 2017-08-22 NOTE — TELEPHONE ENCOUNTER
MTM referral from: Transitions of Care (recent hospital discharge or ED visit)    MTM referral outreach attempt #1 on August 22, 2017 at 12:46 PM      Outcome: Patient is not interested at this time because they were discharged to SNF, will route to MTM Pharmacist/Provider as an FYI. Thank you for the referral.     Radha Mcgregor MTM Coordinator

## 2017-08-22 NOTE — NURSING NOTE
Provided brief education to patient regarding renal replacement therapy options. Patient sleepy throughout this conversation. Patient does have access to Internet access and will plan to complete [employer] class on her own time. Advised that this should be done prior to her next office visit. Kathryn asked appropriate questions regarding hemodialysis. Expressed some interest in home hemodialysis. Her granddaughter may be able to help her. She verbalized understanding to complete the [employer] education prior to her next office visit.    Kathryn was wondering if she would be a kidney transplant candidate. Per Dr. Barfield, she is too sick for an evaluation at this time.    Dialysis unit closest to patient's home:    [facility] [place] [address] [place] [address] [address]   Phone: [phone]   Fax: [phone]   Reference Number: [id number]   Services offered: In-Center Hemo, PD     Attempted to call unit to discuss what vascular group is being used. Unit not open today. Would also like to know the nearest home hemodialysis training unit to [place].    Arlet Webb RN, BSN  Nephrology Care Coordinator  [facility]

## 2017-08-22 NOTE — PROGRESS NOTES
08/22/2017     CC: CKD    HPI: Kathryn Banks is a 77 year old female who presents for follow-up of CKD. Her PMH includes Type 2 DM, CAD with CABG x 5 , chronic atrial fibrillation, HTN, restless leg syndrome, pulmonary HTN, diastolic heart failure , anemia, CKD Stage 4 with baseline serum creatinine 2.4-2.8. Ms. Banks has had multiple MARTÍN episodes in the past with CKD since ~2013.  CKD likely related to DM, HTN, renovascular disease and from previous MARTÍN episodes. More recently, she has had hospitalizations for CHF as well as volume depleted state. Afib with RVR in May with MARTÍN at that time.  Underwent ablation procedure for atrial fibrillation on 6/7/2017.      More recently her difficulties have been less so related to CHF but more related to lower extremity cellulitis bilaterally. She was just hospitalized August 12 to August 18 for this issue. She is currently living in a rehabilitation center  And does not feel her legs are completely resolved despite being off of antibiotic at this point. She is currently off of all Lasix therapy. She describes ongoing difficulty th diarrhea. She describes her stools as loose but only 2 bowel movements per day. Her last colonoscopy was in 2014. In regards to her fluid intake she reports drinking around 16 ouncesof fluid a day at this point. She reports that she is overall feeling okay at this time. Her energy is not great but she also dmits to difficulty sleeping which she feels is contributing to that as well.      Allergies   Allergen Reactions     Ciprofloxacin Nausea and Vomiting     Zofran did not help     Oxycodone Visual Disturbance     Delusions, blackouts      Lisinopril Cough     Penicillins Rash     Sulfa Drugs GI Disturbance     LOSS OF TASTE         Current Outpatient Prescriptions on File Prior to Visit:  ACETAMINOPHEN PO Take 650 mg by mouth every 4 hours as needed for pain For pain 1-5 out of 10   TRAMADOL HCL PO Take 50 mg by mouth every 6 hours as needed  for moderate to severe pain Pain of 6-10 out of 10   cyanocobalamin (VITAMIN B12) 1000 MCG/ML injection Inject 1 mL into the muscle every 30 days   aspirin 81 MG tablet Take 1 tablet (81 mg) by mouth daily   sodium bicarbonate 650 MG tablet Take 2 tablets (1,300 mg) by mouth 3 times daily   nitroGLYcerin (NITROSTAT) 0.4 MG sublingual tablet Place 1 tablet (0.4 mg) under the tongue every 5 minutes as needed for chest pain   gabapentin (NEURONTIN) 300 MG capsule Take 1 capsule (300 mg) by mouth At Bedtime   pravastatin (PRAVACHOL) 40 MG tablet Take 1 tablet (40 mg) by mouth daily   metoprolol (LOPRESSOR) 50 MG tablet Take 0.5 tablets (25 mg) by mouth 2 times daily   amLODIPine (NORVASC) 5 MG tablet Take 2 tablets (10 mg) by mouth daily   ferrous sulfate (IRON) 325 (65 FE) MG tablet Take 1 tablet (325 mg) by mouth daily (with breakfast)   calcium acetate (PHOSLO) 667 MG CAPS capsule Take 1 capsule (667 mg) by mouth 3 times daily (with meals)   pentoxifylline (TRENTAL) 400 MG CR tablet Take 1 tablet (400 mg) by mouth daily   cholecalciferol 2000 UNITS tablet Take 2,000 Units by mouth daily   calcium carbonate-vitamin D 500-400 MG-UNIT TABS per tablet Take 1 tablet by mouth daily   Lactobacillus (ACIDOPHILUS) tablet Take 1 tablet by mouth daily   ORDER FOR DME Equipment being ordered: cpap machine, mask, humidifier, tubing and filters     No current facility-administered medications on file prior to visit.     Past Medical History:   Diagnosis Date     Carpal tunnel syndrome      Coronary atherosclerosis of native coronary artery 2006    5 vessel CABG     OBSTRUCTIVE SLEEP APNEA     using CPAP     Osteoarthrosis, unspecified whether generalized or localized, unspecified site     generalized arthritis, particularly in her hands and feet         Other and combined forms of senile cataract 2000    Bilateral     Other and unspecified hyperlipidemia      RESTLESS LEGS SYNDROME      TENOSYNOV HAND/WRIST NEC 6/30/2006     Type  II or unspecified type diabetes mellitus without mention of complication, not stated as uncontrolled     Diabetes mellitus/pt is diet controlled with weight loss     Typical atrial flutter (H) 2007    ablation 2017     Unspecified essential hypertension        Past Surgical History:   Procedure Laterality Date     ANGIOPLASTY       C APPENDECTOMY       C CABG, VEIN, FIVE      SVG x 4 and LIMA to LAD     C SOTO W/O FACETEC FORAMOT/DSKC  VRT SEG, LUMBAR       C REMV CATARACT INTRACAP,INSERT LENS      Bilateral     C TOTAL ABDOM HYSTERECTOMY       - fibroids     C VAGOTOMY/PYLOROPLASTY,SELECT  1970     COLONOSCOPY  12/3/2007     COLONOSCOPY  2014    Procedure: COLONOSCOPY;  Colonoscopy;  Surgeon: Zack Stearns MD;  Location:  GI     CYSTOSCOPY  09    [facility]     ENDOSCOPY  2000    [facility]     HC COLONOSCOPY THRU STOMA, DIAGNOSTIC  10/7/04    poor prep, repeat in 2-3 years     HC COLONOSCOPY W/WO BRUSH/WASH  10/31/07    Repeat-poor quality prep     HC REMOVAL OF OVARY/TUBE(S)      Salpingo-Oophorectomy, Unilateral     HC REVISE MEDIAN N/CARPAL TUNNEL SURG      Carpal tunnel release     HC UGI ENDOSCOPY DIAG W BIOPSY  10/31/07     HC UGI ENDOSCOPY, SIMPLE EXAM  12/3/2007     OPEN REDUCTION INTERNAL FIXATION HIP NAILING Left 7/3/2016    Procedure: OPEN REDUCTION INTERNAL FIXATION HIP NAILING;  Surgeon: Lake Schulz MD;  Location:  OR       Social History   Substance Use Topics     Smoking status: Former Smoker     Years: 10.00     Quit date: 1969     Smokeless tobacco: Never Used     Alcohol use 0.0 oz/week     0 Standard drinks or equivalent per week      Comment: occasional- 2 drinks per month       Family History   Problem Relation Age of Onset     DIABETES Father      AODM     Neurologic Disorder Father      Parkinson's     Blood Disease Father       from blood clot from leg to lung immediately after hip fracture     DIABETES Paternal Grandmother       adult onset     DIABETES Maternal Grandmother      adult onset     Hypertension No family hx of      CEREBROVASCULAR DISEASE No family hx of        ROS: A 4 system review of systems was negative other than noted here or above.     Exam:  /72 (BP Location: Left arm, Patient Position: Chair, Cuff Size: Adult Regular)  Pulse 79  Temp 97.8  F (36.6  C) (Oral)  Wt 85.3 kg (188 lb 1.6 oz)  SpO2 96%  BMI 32.29 kg/m2    GENERAL APPEARANCE: alert and no distress  RESP: lungs clear to auscultation   CV: regular rhythm, normal rate, no rub  Extremities: no clubbing, cyanosis, legs are wrapped bilaterally. I can appreciated swelling in the legs.   SKIN: no rash  NEURO: mentation intact and speech normal  PSYCH: affect normal/bright    Results:    Orders Only on 08/22/2017   Component Date Value Ref Range Status     Sodium 08/22/2017 147* 133 - 144 mmol/L Final     Potassium 08/22/2017 4.6  3.4 - 5.3 mmol/L Final     Chloride 08/22/2017 118* 94 - 109 mmol/L Final     Carbon Dioxide 08/22/2017 20  20 - 32 mmol/L Final     Anion Gap 08/22/2017 9  3 - 14 mmol/L Final     Glucose 08/22/2017 257* 70 - 99 mg/dL Final    Non Fasting     Urea Nitrogen 08/22/2017 30  7 - 30 mg/dL Final     Creatinine 08/22/2017 4.48* 0.52 - 1.04 mg/dL Final     GFR Estimate 08/22/2017 10* >60 mL/min/1.7m2 Final    Non  GFR Calc     GFR Estimate If Black 08/22/2017 12* >60 mL/min/1.7m2 Final    African American GFR Calc     Calcium 08/22/2017 6.3* 8.5 - 10.1 mg/dL Final     Phosphorus 08/22/2017 4.8* 2.5 - 4.5 mg/dL Final     Albumin 08/22/2017 2.0* 3.4 - 5.0 g/dL Final     WBC 08/22/2017 9.3  4.0 - 11.0 10e9/L Final     RBC Count 08/22/2017 3.10* 3.8 - 5.2 10e12/L Final     Hemoglobin 08/22/2017 7.8* 11.7 - 15.7 g/dL Final    Comment: Critical Value called to and read back by  SWATI WANG AT 1424, 08/22/17 1170       Hematocrit 08/22/2017 25.8* 35.0 - 47.0 % Final     MCV 08/22/2017 83  78 - 100 fl Final     MCH 08/22/2017  25.2* 26.5 - 33.0 pg Final     MCHC 08/22/2017 30.2* 31.5 - 36.5 g/dL Final     RDW 08/22/2017 22.3* 10.0 - 15.0 % Final     Platelet Count 08/22/2017 167  150 - 450 10e9/L Final     Iron 08/22/2017 26* 35 - 180 ug/dL Final     Iron Binding Cap 08/22/2017 136* 240 - 430 ug/dL Final     Iron Saturation Index 08/22/2017 19  15 - 46 % Final     Ferritin 08/22/2017 242  8 - 252 ng/mL Final     Color Urine 08/22/2017 Light Yellow   Final     Appearance Urine 08/22/2017 Slightly Cloudy   Final     Glucose Urine 08/22/2017 150* NEG^Negative mg/dL Final     Bilirubin Urine 08/22/2017 Negative  NEG^Negative Final     Ketones Urine 08/22/2017 Negative  NEG^Negative mg/dL Final     Specific Gravity Urine 08/22/2017 1.007  1.003 - 1.035 Final     Blood Urine 08/22/2017 Trace* NEG^Negative Final     pH Urine 08/22/2017 5.0  5.0 - 7.0 pH Final     Protein Albumin Urine 08/22/2017 10* NEG^Negative mg/dL Final     Urobilinogen mg/dL 08/22/2017 Normal  0.0 - 2.0 mg/dL Final     Nitrite Urine 08/22/2017 Negative  NEG^Negative Final     Leukocyte Esterase Urine 08/22/2017 Large* NEG^Negative Final     Source 08/22/2017 Midstream Urine   Final     WBC Urine 08/22/2017 25-50* OTO2^O - 2 /HPF Final     RBC Urine 08/22/2017 5-10* OTO2^O - 2 /HPF Final     Bacteria Urine 08/22/2017 Few* NEG^Negative /HPF Final     Yeast Urine 08/22/2017 Many* NEG^Negative /HPF Final     Squamous Epithelial /LPF Urine 08/22/2017 Moderate* FEW^Few /LPF Final        Assessment/Plan:   1. MARTÍN on CKD Stage 3:  - CKD: risk factors for CKD include diabetes, hypertension, previous MARTÍN events, and the potential of hemodynamic variability in the setting of her cardiac disease (potential cardiorenal).   - Most recent MARTÍN occurred in the setting of heart failure/aggressive diuresis.   - Because she has had some hematuria in the past serologic tests were done. Her C3 was mildly low with a normal C4. Anti-GBM and vasculitis testing were negative.  - at this time I feel  that there is no benefit of a biopsy given the advanced nature of her kidney disease and the number of years that this has been occurring. My suspicion is that we would find quite a bit of scarring on her biopsy at this time any potential benefit of additional treatment would be outweighed by the significant risk of those treatment options.I also have a low suspicion that there is an underlying GN occurring at this time and instead feel her kidney disease is secondary to diabetes and hypertension as well as previous acute kidney injury events.  - we spent time discussing renal replacement therapy options today. I have encouraged her to get additional education outside of our visits as well. We will discuss this in more detail at her next visit. [facility] nephrology care marleni did spend time with her today giving additional education as well.    2. Hypertension:  Blood pressure is at goal of less than 140/90. No changes are made today.    3. Mild hypernatremia - her water deficit at this time is 2.6 L. I feel she is volume depleted in the setting of diarrhea and therefore encouraged her to increase her water intake at this time. She does have edema in her legs however this may be in the setting of infection as well.    4. Metabolic acidosis:  Bicarbonate level is below goal at 20 however she is already on 1300 mg 3 times a day of bicarbonate. It is likely that she has bicarbonate loss in the setting of kidney disease as well as diarrhea. I will hold off on making additional adjustments of the sodium bicarbonate for the time being given the hypernatremia.    5. Anemia: She is enrolled and anemia services. Hemoglobin is currently 7.8. Iron saturation is low at 19%. I will CC Yesy Samaniego on this note as well.    6. Lower ext cellulitis: I encouraged her to assure good follow-up regarding her lower extremity cellulitis. At this time she is not certain who is following its way encouraged her to set up an appointment with  "Dr. Arango to assure good follow-up plan. Her legs were not evaluated today given wraps are in place.      Patient Instructions   1. Increase water intake - goal of 2-2.5  liters of fluid per day (64 oz).   2. Repeat labs next week at rehab center-  3. Follow-up in 1 month  4. We will look into vascular options this week.   5. [employer]      [facility]      [facility]      \"Learn from home today\"    Try to complete education online prior to your next appointment and have an idea of if you are interested in hemo dialysis or peritoneal dialysis         Vibha Barfield, DO   "

## 2017-08-22 NOTE — NURSING NOTE
"Kathryn Banks's goals for this visit include: CC  She requests these members of her care team be copied on today's visit information: No    PCP: Dheeraj Jj    Referring Provider:  No referring provider defined for this encounter.    Chief Complaint   Patient presents with     RECHECK       Initial There were no vitals taken for this visit. Estimated body mass index is 31.55 kg/(m^2) as calculated from the following:    Height as of 8/10/17: 1.626 m (5' 4\").    Weight as of 8/21/17: 83.4 kg (183 lb 12.8 oz).  Medication Reconciliation: complete  "

## 2017-08-22 NOTE — PROGRESS NOTES
Patient has clinic visit within 24-48 hours of Discharge so no post DC follow up call is needed          Aug 22, 2017  2:00 PM CDT   LAB with LAB FIRST FLOOR [facility] ([facility])     [address] [place] [id number]   [phone]                            Aug 22, 2017  2:30 PM CDT   Return Visit with Vibha Barfield MD   [facility])     [address] [place] [address]   [phone]

## 2017-08-22 NOTE — PATIENT INSTRUCTIONS
"1. Increase water intake - goal of 2-2.5  liters of fluid per day (64 oz).   2. Repeat labs next week at rehab center-  3. Follow-up in 1 month  4. We will look into vascular options this week.   5. [employer]      [facility]      [facility]      \"Learn from home today\"    Try to complete education online prior to your next appointment and have an idea of if you are interested in hemo dialysis or peritoneal dialysis  "

## 2017-08-22 NOTE — MR AVS SNAPSHOT
"              After Visit Summary   8/22/2017    Kathryn Banks    MRN: [id number]           Patient Information     Date Of Birth          1940        Visit Information        Provider Department      8/22/2017 2:30 PM Vibha Barfield MD [facility]        Care Instructions    1. Increase water intake - goal of 2-2.5  liters of fluid per day (64 oz).   2. Repeat labs next week at rehab center-  3. Follow-up in 1 month  4. We will look into vascular options this week.   5. [employer]      [facility]      [facility]      \"Learn from home today\"    Try to complete education online prior to your next appointment and have an idea of if you are interested in hemo dialysis or peritoneal dialysis          Follow-ups after your visit        Your next 10 appointments already scheduled     Sep 19, 2017 11:30 AM CDT   LAB with LAB FIRST FLOOR [facility] ([facility])    [address] [place] [id number] [phone]           Patient must bring picture ID. Patient should be prepared to give a urine specimen  Please do not eat 10-12 hours before your appointment if you are coming in fasting for labs on lipids, cholesterol, or glucose (sugar). Pregnant women should follow their Care Team instructions. Water with medications is okay. Do not drink coffee or other fluids. If you have concerns about taking  your medications, please ask at office or if scheduling via Webroot, send a message by clicking on Secure Messaging, Message Your Care Team.            Sep 19, 2017 12:00 PM CDT   Return Visit with Vibha Barfield MD   [facility]    [address] [place] [id number] [phone]              Who to contact     If you have questions or need follow up information about today's clinic visit or your schedule please contact [facility] " CLINICS directly at [phone].  Normal or non-critical lab and imaging results will be communicated to you by Intertwinehart, letter or phone within 4 business days after the clinic has received the results. If you do not hear from us within 7 days, please contact the clinic through Intertwine[employer] or phone. If you have a critical or abnormal lab result, we will notify you by phone as soon as possible.  Submit refill requests through [employer] or call your pharmacy and they will forward the refill request to us. Please allow 3 business days for your refill to be completed.          Additional Information About Your Visit        [employer] Information     [employer] is an electronic gateway that provides easy, online access to your medical records. With [employer], you can request a clinic appointment, read your test results, renew a prescription or communicate with your care team.     To sign up for [employer] visit the website at [email or web address]   You will be asked to enter the access code listed below, as well as some personal information. Please follow the directions to create your username and password.     Your access code is: [email or web address]  Expires: 2017  4:04 PM     Your access code will  in 90 days. If you need help or a new code, please contact your [facility] or call [phone] for assistance.        Care EveryWhere ID     This is your Care EveryWhere ID. This could be used by other organizations to access your [facility] medical records  [id number]        Your Vitals Were     Pulse Temperature Pulse Oximetry BMI (Body Mass Index)          79 97.8  F (36.6  C) (Oral) 96% 32.29 kg/m2         Blood Pressure from Last 3 Encounters:   17 129/72   17 120/71   17 151/80    Weight from Last 3 Encounters:   17 188 lb 1.6 oz (85.3 kg)   17 183 lb 12.8 oz (83.4 kg)   17 180 lb 12.8 oz (82 kg)              Today, you had the following     No orders  found for display       Primary Care Provider Office Phone # Fax #    Dheeraj Jj [phone]       [facility] [address] [place] [address]        Equal Access to Services     CAROLINE BLAND : Hadbereket pascual ku juaquino Soomaali, waaxda luqadaha, qaybta kaalmada adeegyada, waxhugo manueln beverly banda laCraigevelia umrillo. So [facility] [phone].    ATENCIÓN: Si habla español, tiene a medrano disposición servicios gratuitos de asistencia lingüística. Llame al [phone].    We comply with applicable federal civil rights laws and [place] laws. We do not discriminate on the basis of race, color, national origin, age, disability sex, sexual orientation or gender identity.            Thank you!     Thank you for choosing [facility]  for your care. Our goal is always to provide you with excellent care. Hearing back from our patients is one way we can continue to improve our services. Please take a few minutes to complete the written survey that you may receive in the mail after your visit with us. Thank you!             Your Updated Medication List - Protect others around you: Learn how to safely use, store and throw away your medicines at www.disposemymeds.org.          This list is accurate as of: 8/22/17  4:04 PM.  Always use your most recent med list.                   Brand Name Dispense Instructions for use Diagnosis    ACETAMINOPHEN PO      Take 650 mg by mouth every 4 hours as needed for pain For pain 1-5 out of 10        acidophilus tablet      Take 1 tablet by mouth daily    Urinary tract infection without hematuria, site unspecified, Cellulitis of lower extremity, unspecified laterality       amLODIPine 5 MG tablet    NORVASC    30 tablet    Take 2 tablets (10 mg) by mouth daily    Benign essential hypertension       aspirin 81 MG tablet     30 tablet    Take 1 tablet (81 mg) by mouth daily    Coronary artery disease involving native coronary artery of native heart  without angina pectoris       calcium acetate 667 MG Caps capsule    PHOSLO    180 capsule    Take 1 capsule (667 mg) by mouth 3 times daily (with meals)    Chronic kidney disease, unspecified CKD stage       calcium carbonate-vitamin D 500-400 MG-UNIT Tabs per tablet     60 tablet    Take 1 tablet by mouth daily    Chronic kidney disease, unspecified CKD stage       cholecalciferol 2000 UNITS tablet     30 tablet    Take 2,000 Units by mouth daily    Vitamin D deficiency       cyanocobalamin 1000 MCG/ML injection    VITAMIN B12     Inject 1 mL into the muscle every 30 days        ferrous sulfate 325 (65 FE) MG tablet    IRON    100 tablet    Take 1 tablet (325 mg) by mouth daily (with breakfast)    Iron deficiency anemia secondary to inadequate dietary iron intake       gabapentin 300 MG capsule    NEURONTIN    90 capsule    Take 1 capsule (300 mg) by mouth At Bedtime    Diabetic polyneuropathy associated with type 2 diabetes mellitus (H)       metoprolol 50 MG tablet    LOPRESSOR    180 tablet    Take 0.5 tablets (25 mg) by mouth 2 times daily    Atrial fibrillation with rapid ventricular response (H)       nitroGLYcerin 0.4 MG sublingual tablet    NITROSTAT    25 tablet    Place 1 tablet (0.4 mg) under the tongue every 5 minutes as needed for chest pain    Hx of non-ST elevation myocardial infarction (NSTEMI), Atherosclerosis of native coronary artery of native heart without angina pectoris, Postsurgical aortocoronary bypass status       order for DME     1 Device    Equipment being ordered: cpap machine, mask, humidifier, tubing and filters    ANABEL (obstructive sleep apnea)       pentoxifylline 400 MG CR tablet    TRENtal     Take 1 tablet (400 mg) by mouth daily    Venous stasis ulcer (H)       pravastatin 40 MG tablet    PRAVACHOL    90 tablet    Take 1 tablet (40 mg) by mouth daily    Hx of non-ST elevation myocardial infarction (NSTEMI), Atherosclerosis of native coronary artery of native heart without angina  pectoris, Type 2 diabetes mellitus with other circulatory complications (H)       sodium bicarbonate 650 MG tablet      Take 2 tablets (1,300 mg) by mouth 3 times daily    Chronic kidney disease, unspecified CKD stage       TRAMADOL HCL PO      Take 50 mg by mouth every 6 hours as needed for moderate to severe pain Pain of 6-10 out of 10

## 2017-08-22 NOTE — TELEPHONE ENCOUNTER
Call placed to [facility] where patient currently resides. Informed RN of need for repeat BMP next week. Will fax order to [phone]. Verbal order given as well.    Arlet Webb, RN, BSN  Nephrology Care Coordinator  [facility]

## 2017-08-23 ENCOUNTER — TELEPHONE (OUTPATIENT)
Dept: PHARMACY | Facility: CLINIC | Age: 77
End: 2017-08-23

## 2017-08-23 ENCOUNTER — NURSING HOME VISIT (OUTPATIENT)
Dept: GERIATRICS | Facility: CLINIC | Age: 77
End: 2017-08-23
Payer: COMMERCIAL

## 2017-08-23 VITALS
TEMPERATURE: 98 F | SYSTOLIC BLOOD PRESSURE: 153 MMHG | BODY MASS INDEX: 31.51 KG/M2 | DIASTOLIC BLOOD PRESSURE: 84 MMHG | WEIGHT: 183.6 LBS | HEART RATE: 83 BPM | OXYGEN SATURATION: 94 % | RESPIRATION RATE: 20 BRPM

## 2017-08-23 DIAGNOSIS — N18.4 ANEMIA OF CHRONIC RENAL FAILURE, STAGE 4 (SEVERE) (H): ICD-10-CM

## 2017-08-23 DIAGNOSIS — I50.32 CHRONIC DIASTOLIC CONGESTIVE HEART FAILURE (H): ICD-10-CM

## 2017-08-23 DIAGNOSIS — D63.1 ANEMIA OF CHRONIC RENAL FAILURE, STAGE 4 (SEVERE) (H): ICD-10-CM

## 2017-08-23 DIAGNOSIS — E87.20 METABOLIC ACIDOSIS: ICD-10-CM

## 2017-08-23 DIAGNOSIS — R06.02 SOB (SHORTNESS OF BREATH): ICD-10-CM

## 2017-08-23 DIAGNOSIS — I48.20 CHRONIC ATRIAL FIBRILLATION (H): ICD-10-CM

## 2017-08-23 DIAGNOSIS — L97.909 ULCER OF LOWER LIMB, UNSPECIFIED LATERALITY, WITH UNSPECIFIED SEVERITY (H): ICD-10-CM

## 2017-08-23 DIAGNOSIS — N17.9 ACUTE RENAL FAILURE SUPERIMPOSED ON STAGE 4 CHRONIC KIDNEY DISEASE, UNSPECIFIED ACUTE RENAL FAILURE TYPE (H): Primary | ICD-10-CM

## 2017-08-23 DIAGNOSIS — L03.119 CELLULITIS OF LOWER EXTREMITY, UNSPECIFIED LATERALITY: ICD-10-CM

## 2017-08-23 DIAGNOSIS — I25.10 ATHEROSCLEROSIS OF NATIVE CORONARY ARTERY OF NATIVE HEART WITHOUT ANGINA PECTORIS: ICD-10-CM

## 2017-08-23 DIAGNOSIS — K52.9 CHRONIC DIARRHEA: ICD-10-CM

## 2017-08-23 DIAGNOSIS — E11.59 TYPE 2 DIABETES MELLITUS WITH OTHER CIRCULATORY COMPLICATIONS (H): ICD-10-CM

## 2017-08-23 DIAGNOSIS — J90 PLEURAL EFFUSION: ICD-10-CM

## 2017-08-23 DIAGNOSIS — N18.4 ACUTE RENAL FAILURE SUPERIMPOSED ON STAGE 4 CHRONIC KIDNEY DISEASE, UNSPECIFIED ACUTE RENAL FAILURE TYPE (H): Primary | ICD-10-CM

## 2017-08-23 DIAGNOSIS — I10 ESSENTIAL HYPERTENSION: ICD-10-CM

## 2017-08-23 DIAGNOSIS — A41.9 SEPSIS, DUE TO UNSPECIFIED ORGANISM: ICD-10-CM

## 2017-08-23 DIAGNOSIS — G47.01 INSOMNIA DUE TO MEDICAL CONDITION: ICD-10-CM

## 2017-08-23 DIAGNOSIS — R53.81 PHYSICAL DECONDITIONING: ICD-10-CM

## 2017-08-23 DIAGNOSIS — R30.0 DYSURIA: ICD-10-CM

## 2017-08-23 DIAGNOSIS — H53.2 VERTICAL DIPLOPIA: ICD-10-CM

## 2017-08-23 LAB — DSDNA AB SER-ACNC: 4 IU/ML

## 2017-08-23 PROCEDURE — 99309 SBSQ NF CARE MODERATE MDM 30: CPT | Performed by: NURSE PRACTITIONER

## 2017-08-23 NOTE — PROGRESS NOTES
[place] [facility]    Chief Complaint   Patient presents with     Nursing Home Acute       HPI:    Kathryn Banks is a 77 year old  (1940), who is being seen today for an episodic care visit at [facility].  HPI information obtained from: facility chart records, facility staff and patient report.    Today's concern is:     Acute renal failure superimposed on stage 4 chronic kidney disease, unspecified acute renal failure type (H)  Metabolic acidosis  Sepsis, due to unspecified organism (H)  Cellulitis of lower extremity, unspecified laterality  Dysuria  Ulcer of lower limb, unspecified laterality, with unspecified severity (H)  Physical deconditioning  Type 2 diabetes mellitus with other circulatory complications (H)  Chronic diastolic congestive heart failure (H)  Atherosclerosis of native coronary artery of native heart without angina pectoris  Essential hypertension  Chronic atrial fibrillation (H)  Vertical diplopia  Pleural effusion  Anemia of chronic renal failure, stage 4 (severe) (H)  Chronic diarrhea  Insomnia due to medical condition  SOB (shortness of breath)     Patient is met today in her room at [facility] where she is working with therapy.  She has recently been to see the Nephrologist who has discussed with her the options of HD vs PD vs palliative.  Patient noted she is depressed about this news but doesn't want palliative option.      She otherwise c/o pain her legs that is tolerable under the current regimen of pain medication.  She also c/o SOB that is worsening and persistent diarrhea despite completion of antibiotics.   She denies CP, fever/chills, heartburn, upset stomach.        ALLERGIES: Ciprofloxacin; Oxycodone; Lisinopril; Penicillins; and Sulfa drugs  Past Medical, Surgical, Family and Social History reviewed and updated in [facility].    Current Outpatient Prescriptions   Medication Sig Dispense Refill     ACETAMINOPHEN PO Take 650 mg by mouth every 4 hours as needed for  pain For pain 1-5 out of 10       TRAMADOL HCL PO Take 50 mg by mouth every 6 hours as needed for moderate to severe pain Pain of 6-10 out of 10       cyanocobalamin (VITAMIN B12) 1000 MCG/ML injection Inject 1 mL into the muscle every 30 days       aspirin 81 MG tablet Take 1 tablet (81 mg) by mouth daily 30 tablet 3     sodium bicarbonate 650 MG tablet Take 2 tablets (1,300 mg) by mouth 3 times daily       nitroGLYcerin (NITROSTAT) 0.4 MG sublingual tablet Place 1 tablet (0.4 mg) under the tongue every 5 minutes as needed for chest pain 25 tablet 0     gabapentin (NEURONTIN) 300 MG capsule Take 1 capsule (300 mg) by mouth At Bedtime 90 capsule      pravastatin (PRAVACHOL) 40 MG tablet Take 1 tablet (40 mg) by mouth daily 90 tablet 3     metoprolol (LOPRESSOR) 50 MG tablet Take 0.5 tablets (25 mg) by mouth 2 times daily 180 tablet 3     amLODIPine (NORVASC) 5 MG tablet Take 2 tablets (10 mg) by mouth daily 30 tablet 3     ferrous sulfate (IRON) 325 (65 FE) MG tablet Take 1 tablet (325 mg) by mouth daily (with breakfast) 100 tablet      calcium acetate (PHOSLO) 667 MG CAPS capsule Take 1 capsule (667 mg) by mouth 3 times daily (with meals) 180 capsule      pentoxifylline (TRENTAL) 400 MG CR tablet Take 1 tablet (400 mg) by mouth daily       cholecalciferol 2000 UNITS tablet Take 2,000 Units by mouth daily 30 tablet      calcium carbonate-vitamin D 500-400 MG-UNIT TABS per tablet Take 1 tablet by mouth daily 60 tablet      Lactobacillus (ACIDOPHILUS) tablet Take 1 tablet by mouth daily       ORDER FOR DME Equipment being ordered: cpap machine, mask, humidifier, tubing and filters 1 Device 0     Medications reviewed:  Medications reconciled to facility chart and changes were made to reflect current medications as identified as above med list. Below are the changes that were made:   Medications stopped since last EPIC medication reconciliation:   There are no discontinued medications.    Medications started since last  EPIC medication reconciliation:  No orders of the defined types were placed in this encounter.    REVIEW OF SYSTEMS:  10 point ROS of systems including Constitutional, Eyes, Respiratory, Cardiovascular, Gastroenterology, Genitourinary, Integumentary, Muscularskeletal, Psychiatric were all negative except for pertinent positives noted in my HPI.    Physical Exam:  /84  Pulse 83  Temp 98  F (36.7  C)  Resp 20  Wt 183 lb 9.6 oz (83.3 kg)  SpO2 94%  BMI 31.51 kg/m2  ASSESSMENT:  GENERAL APPEARANCE:  Alert, in no distress  RESP:  respiratory effort and palpation of chest normal, auscultation of lungs clear, no respiratory distress, no cough during visit  CV:  Palpation and auscultation of heart done , rate and rhythm regular, no murmur, mild to moderate LE peripheral edema, tubigrips in place  ABDOMEN:  normal bowel sounds, soft, nontender, no hepatosplenomegaly or other masses  M/S:   Gait and station with WC mobility or using 4WW for therapy, Digits and nails with arthritic changes/baseline, reduced muscle mass  SKIN:  Inspection and Palpation of skin and subcutaneous tissue with LEs wrapped (not visualized this visit as dressing change just occurred) - nursing noting improvement  PSYCH:  insight and judgement, memory intact, affect and mood normal, follows commands readily     Recent Labs:    Last Basic Metabolic Panel:  Lab Results   Component Value Date     08/22/2017      Lab Results   Component Value Date    POTASSIUM 4.6 08/22/2017     Lab Results   Component Value Date    CHLORIDE 118 08/22/2017     Lab Results   Component Value Date    MALICK 6.3 08/22/2017     Lab Results   Component Value Date    CO2 20 08/22/2017     Lab Results   Component Value Date    BUN 30 08/22/2017     Lab Results   Component Value Date    CR 4.48 08/22/2017     Lab Results   Component Value Date     08/22/2017     Lab Results   Component Value Date    WBC 9.3 08/22/2017     Lab Results   Component Value Date     RBC 3.10 08/22/2017     Lab Results   Component Value Date    HGB 7.8 08/22/2017     Lab Results   Component Value Date    HCT 25.8 08/22/2017     No components found for: MCT  Lab Results   Component Value Date    MCV 83 08/22/2017     Lab Results   Component Value Date    MCH 25.2 08/22/2017     Lab Results   Component Value Date    MCHC 30.2 08/22/2017     Lab Results   Component Value Date    RDW 22.3 08/22/2017     Lab Results   Component Value Date     08/22/2017         Assessment/Plan:  Acute renal failure superimposed on stage 4 chronic kidney disease, unspecified acute renal failure type (H)  Metabolic acidosis  Presented to the hospital with worsening labs.  Cardiorenal vs sepsis - exacerbation  Diuresed with Lasix and acidosis improved with NaHCO3 1300 mg TID  Per records - baseline CO2 is 21 - was 19 on D/C, 8/22/17 20  Presented with pH of 7.06 - improved with NaHCO3 and resolution of infection. (pH 7.3 on D/C)     No HD done while in-patient.      Admit to Hosp: BUN 40, Creat 5.08, GFR 8  D/C from hosp: BUN 43, Creat 5/13, GFR 8  8/22/17: BUN 30, Creat 4.48, GFR 10     Patient had 8/22/17 f/u with Nephrology (Dr. Barfield, [facility]) - per note: kidney disease r/t DM2 and HTN, previous AKIs.  Noted patient to be intravascularly dry and told to increase po intake to 2-2.5 Liters per day of water.  Requested labs next week at TCU and asked them to be faxed to her office.  Discussion re: HD vs PD vs palliative. Patient noting she does not want palliative.  Education resources given and patient asked to review before f/u with nephrology in 1 month.   F/u scheduled for 9/19/17.     Continue NaHCO3 1300mg TID and monitor CO2 - borderline low but already on TID dosing.         Sepsis (H)  Cellulitis  Dysuria  IV ANTIBIOTICS: Vano and Zosyn -->cefepime and ampicillin --> levofloxacin and amoxicillin at TCU, now complete.      CT of legs negative for deep tissue infection.   WBC 6.4 on D/C from  hospital,   8/22/17 - 9.3  Will trend WBC as rising.      Ulcer of lower limb (H)  Likely venous stasis ulcers. Vascular Surgery consulted and noted ultimately no microvascular disease.    Wound Care and Dermatology followed in hospital.   Previously on antibiotics for cellulitis vs UTI - now complete  Patient today c/o pain in her legs from ulcers - analgesia with PRN Tylenol which she reports relief from.  Also has PRN Tramadol ordered and has used 5 x in last 2 days.      Significant areas of wounds - 11 on RLE, 4 on LLE.   Nursing noting improvement with Santyl.  Will write to DC once slough is gone.      Continues pentoxifylline 400 gm daily  Dermatology f/u in 4-6 weeks  Will monitor for infection now that antibiotics are completed.  Will monitor closely for need to consult wound clinic.      Physical deconditioning  Multiple hospitalizations and TCU stays  Physical Therapy/ Occupational Therapy for rehab    Progress report from therapy:  Petros 17  SLUMS 28  Ambulating 200 ft with 4WW - limited by fatigue  SBA for ADLs.      Type 2 diabetes mellitus with other circulatory complications (H)        Lab Results   Component Value Date     A1C 7.3 06/22/2017     A1C 6.2 03/31/2017     A1C 6.6 07/03/2016     A1C 7.1 04/12/2016     A1C 6.0 06/08/2015   On no meds for DM2  On ASA, statin, no ACEI d/t CKD4.      Patient noted neuropathy in bilat heels that are intermittent, usually occurring when in bed.      Chronic diastolic congestive heart failure (H)  Atherosclerosis of native coronary artery of native heart without angina pectoris s/p 5vCABG  5/31/17 ECHO with EF 60-65%, LV hypertrophy, biatrial enlargement, elevated RV pressures. BNP reported around 40K on admission.   Lasix DC'd in the hospital - remains off.      Weights stable:  8/19 - 184  8/20 - 182  8/21 - 183.8  8/22 - 183.6     LS clear  Mild to moderate LE edema today.      On ASA 81 mg daily, prevastatin 40 mg daily.     With patient being told to  "increase po water intake, will monitor for CHF exacerbation.      Essential hypertension  On PTA amlodipine 10 mg daily and Metoprolol 25 mg BID,   BPs 120-160s/70-80s  HRs 80s  Patient denies CP, HA,  lightheadedness.      Will monitor for titration needs.   Goal <140/90     Chronic atrial fibrillation (H) on 6/12-6/13  F/y Dr. King, Cardiology (last visit 8/10/17)  S/p ablation 6/2017  Coumadin stopped at 8/10/17 visit.   NSR during hospitalization     HR regular today, 80s  On Metoprolol 25 mg BID for rate control. (has ASA 81 mg daily)     Vertical diplopia  Patient reports double vision tim constantly  Patient had to cancel recent Opthalmology appt d/t in-patient status.  Patient reports she is making another Opthalmology appt.      Pleural effusion  SOB  Reported on DC summary  LS clear today with dim bases  Patient reports increasing SOB - notes \"i hear wheezing\"  Sats 94-99% on RA  Will monitor in presence of increasing po intake.  Will order DuoNebs PRN for SOB, wheezes.       Anemia of chronic renal failure, stage 4 (severe) (H)  Baseline Hgb 8-9  8.0 on DC from the hospital  8/22/17 - Hgb 7.8 - Nephrologist consulted Anemia Services (Yesy Samaniego)  Patient endorse fatigue.   On Fe supplements,     Chronic diarrhea  Likely worsened in presence of antibiotics (completed now)  C Dif neg in hosp.  Patient noted persistent diarrhea - will order PRN Imodium for when patient goes out.       Insomnia due to medical condition  Patient reports difficult staying asleep, noting she wakes up and her mind starts to race.  Discussion regarding non-pharm interventions and pharm-interventions prev. visit.   Patient declined meds  Will monitor for need.       Orders:  1. Vital Signs at least 1 hour before med administration of BP meds.  2. DuoNebs 1 vial Q4hrs PRN  Dx: SOB, Wheezing  3. When Slough gone from LEs, ok to D/C Santyl and change dsg, changes to: Cover with adaptic, abd dressing, kerlix and tubigrips, daily   4. " Imodium 4mg PO x1 then 2mg with each loose stool not to exceed 8mg /day PRN, Dx: Diarrhea  5. Daily Weights. Dx: CHF  6. Add CBC to 8/29/17 labs. Dx Recent Cellulitis, ammonia      Electronically signed by  RHODA Bowden CNP

## 2017-08-23 NOTE — TELEPHONE ENCOUNTER
Follow-up with anemia management service:    She needs to schedule Injectafer 750mg weekly x 2 doses. Sent message to Dr Barfield to be sure that IV iron ok to be administered in setting of cellulitis. Then will contact patient with recommendation.     Anemia Latest Ref Rng & Units 2017 2017 8/15/2017 2017 2017 2017 2017   Hemoglobin 11.7 - 15.7 g/dL 7.3(L) 7.6(L) 7.4(L) 7.4(L) 7.4(L) 8.0(L) 7.8(L)   TSAT 15 - 46 % 17 - - - - - 19   Ferritin 8 - 252 ng/mL 188 - - - - - 242     Orders needed to be renewed (for next follow-up date) in EPIC: None   Lab orders : 2018    Follow-up call date:     [facility]    Anemia Management Service  Yesy Samaniego PharmD and Gretchen Brady CPhT  Phone: [phone]  Fax: [phone]

## 2017-08-25 ENCOUNTER — TRANSFERRED RECORDS (OUTPATIENT)
Dept: HEALTH INFORMATION MANAGEMENT | Facility: CLINIC | Age: 77
End: 2017-08-25

## 2017-08-26 ENCOUNTER — APPOINTMENT (OUTPATIENT)
Dept: GENERAL RADIOLOGY | Facility: CLINIC | Age: 77
DRG: 291 | End: 2017-08-26
Attending: EMERGENCY MEDICINE
Payer: MEDICARE

## 2017-08-26 ENCOUNTER — HOSPITAL ENCOUNTER (INPATIENT)
Facility: CLINIC | Age: 77
LOS: 6 days | Discharge: NURSING, REHAB OR LONG-TERM CARE | DRG: 291 | End: 2017-09-01
Attending: EMERGENCY MEDICINE | Admitting: INTERNAL MEDICINE
Payer: MEDICARE

## 2017-08-26 DIAGNOSIS — I50.32 CHRONIC DIASTOLIC CONGESTIVE HEART FAILURE (H): ICD-10-CM

## 2017-08-26 DIAGNOSIS — R09.81 NASAL CONGESTION: Primary | ICD-10-CM

## 2017-08-26 DIAGNOSIS — J81.0 ACUTE PULMONARY EDEMA (H): ICD-10-CM

## 2017-08-26 DIAGNOSIS — R06.03 RESPIRATORY DISTRESS: ICD-10-CM

## 2017-08-26 DIAGNOSIS — R05.9 COUGH: ICD-10-CM

## 2017-08-26 DIAGNOSIS — E87.20 ACIDOSIS: ICD-10-CM

## 2017-08-26 DIAGNOSIS — E20.1 CONSTITUTIONAL CHRONIC HYPOCALCEMIA: ICD-10-CM

## 2017-08-26 DIAGNOSIS — I48.91 ATRIAL FIBRILLATION WITH RAPID VENTRICULAR RESPONSE (H): ICD-10-CM

## 2017-08-26 PROBLEM — J81.1 PULMONARY EDEMA: Status: ACTIVE | Noted: 2017-08-26

## 2017-08-26 LAB
ALBUMIN SERPL-MCNC: 1.8 G/DL (ref 3.4–5)
ALBUMIN UR-MCNC: NEGATIVE MG/DL
ALP SERPL-CCNC: 292 U/L (ref 40–150)
ALT SERPL W P-5'-P-CCNC: 80 U/L (ref 0–50)
ANION GAP SERPL CALCULATED.3IONS-SCNC: 8 MMOL/L (ref 3–14)
APPEARANCE UR: ABNORMAL
AST SERPL W P-5'-P-CCNC: 34 U/L (ref 0–45)
BASE DEFICIT BLDA-SCNC: 6 MMOL/L
BASE DEFICIT BLDV-SCNC: 4.3 MMOL/L
BASOPHILS # BLD AUTO: 0.1 10E9/L (ref 0–0.2)
BASOPHILS NFR BLD AUTO: 0.5 %
BILIRUB SERPL-MCNC: 0.3 MG/DL (ref 0.2–1.3)
BILIRUB UR QL STRIP: NEGATIVE
BUN SERPL-MCNC: 35 MG/DL (ref 7–30)
CALCIUM SERPL-MCNC: 6.1 MG/DL (ref 8.5–10.1)
CHLORIDE SERPL-SCNC: 118 MMOL/L (ref 94–109)
CO2 SERPL-SCNC: 22 MMOL/L (ref 20–32)
COLOR UR AUTO: ABNORMAL
CREAT SERPL-MCNC: 4.42 MG/DL (ref 0.52–1.04)
CREAT UR-MCNC: 16 MG/DL
DIFFERENTIAL METHOD BLD: ABNORMAL
EOSINOPHIL # BLD AUTO: 0.1 10E9/L (ref 0–0.7)
EOSINOPHIL NFR BLD AUTO: 0.5 %
ERYTHROCYTE [DISTWIDTH] IN BLOOD BY AUTOMATED COUNT: 22.6 % (ref 10–15)
GFR SERPL CREATININE-BSD FRML MDRD: 10 ML/MIN/1.7M2
GLUCOSE BLDC GLUCOMTR-MCNC: 135 MG/DL (ref 70–99)
GLUCOSE SERPL-MCNC: 189 MG/DL (ref 70–99)
GLUCOSE UR STRIP-MCNC: NEGATIVE MG/DL
HCO3 BLD-SCNC: 20 MMOL/L (ref 21–28)
HCO3 BLDV-SCNC: 22 MMOL/L (ref 21–28)
HCT VFR BLD AUTO: 24.8 % (ref 35–47)
HGB BLD-MCNC: 7.4 G/DL (ref 11.7–15.7)
HGB UR QL STRIP: NEGATIVE
IMM GRANULOCYTES # BLD: 0.1 10E9/L (ref 0–0.4)
IMM GRANULOCYTES NFR BLD: 0.8 %
INR PPP: 1.11 (ref 0.86–1.14)
KETONES UR STRIP-MCNC: NEGATIVE MG/DL
LACTATE BLD-SCNC: 0.6 MMOL/L (ref 0.7–2)
LACTATE BLD-SCNC: 0.8 MMOL/L (ref 0.7–2)
LEUKOCYTE ESTERASE UR QL STRIP: ABNORMAL
LYMPHOCYTES # BLD AUTO: 0.7 10E9/L (ref 0.8–5.3)
LYMPHOCYTES NFR BLD AUTO: 5.8 %
MCH RBC QN AUTO: 25.2 PG (ref 26.5–33)
MCHC RBC AUTO-ENTMCNC: 29.8 G/DL (ref 31.5–36.5)
MCV RBC AUTO: 84 FL (ref 78–100)
MONOCYTES # BLD AUTO: 0.6 10E9/L (ref 0–1.3)
MONOCYTES NFR BLD AUTO: 5 %
NEUTROPHILS # BLD AUTO: 11.3 10E9/L (ref 1.6–8.3)
NEUTROPHILS NFR BLD AUTO: 87.4 %
NITRATE UR QL: NEGATIVE
NRBC # BLD AUTO: 0 10*3/UL
NRBC BLD AUTO-RTO: 0 /100
NT-PROBNP SERPL-MCNC: ABNORMAL PG/ML (ref 0–1800)
O2/TOTAL GAS SETTING VFR VENT: 60 %
O2/TOTAL GAS SETTING VFR VENT: 70 %
PCO2 BLD: 44 MM HG (ref 35–45)
PCO2 BLDV: 46 MM HG (ref 40–50)
PH BLD: 7.27 PH (ref 7.35–7.45)
PH BLDV: 7.29 PH (ref 7.32–7.43)
PH UR STRIP: 5 PH (ref 5–7)
PLATELET # BLD AUTO: 219 10E9/L (ref 150–450)
PO2 BLD: 104 MM HG (ref 80–105)
PO2 BLDV: 49 MM HG (ref 25–47)
POTASSIUM SERPL-SCNC: 4.8 MMOL/L (ref 3.4–5.3)
PROCALCITONIN SERPL-MCNC: 0.09 NG/ML
PROT SERPL-MCNC: 5.2 G/DL (ref 6.8–8.8)
RBC # BLD AUTO: 2.94 10E12/L (ref 3.8–5.2)
RBC #/AREA URNS AUTO: 5 /HPF (ref 0–2)
SODIUM SERPL-SCNC: 147 MMOL/L (ref 133–144)
SOURCE: ABNORMAL
SP GR UR STRIP: 1 (ref 1–1.03)
TROPONIN I SERPL-MCNC: <0.015 UG/L (ref 0–0.04)
UROBILINOGEN UR STRIP-MCNC: NORMAL MG/DL (ref 0–2)
UUN UR-MCNC: 92 MG/DL
UUN/CREAT 24H UR: 6 G/G CR
WBC # BLD AUTO: 12.9 10E9/L (ref 4–11)
WBC #/AREA URNS AUTO: 7 /HPF (ref 0–2)
YEAST #/AREA URNS HPF: ABNORMAL /HPF

## 2017-08-26 PROCEDURE — 83605 ASSAY OF LACTIC ACID: CPT | Performed by: INTERNAL MEDICINE

## 2017-08-26 PROCEDURE — 25000125 ZZHC RX 250: Performed by: STUDENT IN AN ORGANIZED HEALTH CARE EDUCATION/TRAINING PROGRAM

## 2017-08-26 PROCEDURE — 00000146 ZZHCL STATISTIC GLUCOSE BY METER IP

## 2017-08-26 PROCEDURE — 40000281 ZZH STATISTIC TRANSPORT TIME EA 15 MIN

## 2017-08-26 PROCEDURE — 40000275 ZZH STATISTIC RCP TIME EA 10 MIN

## 2017-08-26 PROCEDURE — 40000275 ZZH STATISTIC RCP TIME EA 10 MIN: Performed by: OPTOMETRIST

## 2017-08-26 PROCEDURE — 86901 BLOOD TYPING SEROLOGIC RH(D): CPT | Performed by: EMERGENCY MEDICINE

## 2017-08-26 PROCEDURE — 25000132 ZZH RX MED GY IP 250 OP 250 PS 637: Mod: GY | Performed by: STUDENT IN AN ORGANIZED HEALTH CARE EDUCATION/TRAINING PROGRAM

## 2017-08-26 PROCEDURE — 36415 COLL VENOUS BLD VENIPUNCTURE: CPT | Performed by: STUDENT IN AN ORGANIZED HEALTH CARE EDUCATION/TRAINING PROGRAM

## 2017-08-26 PROCEDURE — 82803 BLOOD GASES ANY COMBINATION: CPT | Performed by: EMERGENCY MEDICINE

## 2017-08-26 PROCEDURE — 99223 1ST HOSP IP/OBS HIGH 75: CPT | Mod: AI | Performed by: INTERNAL MEDICINE

## 2017-08-26 PROCEDURE — 87086 URINE CULTURE/COLONY COUNT: CPT | Performed by: STUDENT IN AN ORGANIZED HEALTH CARE EDUCATION/TRAINING PROGRAM

## 2017-08-26 PROCEDURE — 86923 COMPATIBILITY TEST ELECTRIC: CPT | Performed by: EMERGENCY MEDICINE

## 2017-08-26 PROCEDURE — 84540 ASSAY OF URINE/UREA-N: CPT | Performed by: STUDENT IN AN ORGANIZED HEALTH CARE EDUCATION/TRAINING PROGRAM

## 2017-08-26 PROCEDURE — 27210300 ZZH CANNULA HIGH FLOW, ADULT

## 2017-08-26 PROCEDURE — 86900 BLOOD TYPING SEROLOGIC ABO: CPT | Performed by: EMERGENCY MEDICINE

## 2017-08-26 PROCEDURE — A9270 NON-COVERED ITEM OR SERVICE: HCPCS | Mod: GY | Performed by: STUDENT IN AN ORGANIZED HEALTH CARE EDUCATION/TRAINING PROGRAM

## 2017-08-26 PROCEDURE — 25000128 H RX IP 250 OP 636: Performed by: EMERGENCY MEDICINE

## 2017-08-26 PROCEDURE — 87040 BLOOD CULTURE FOR BACTERIA: CPT | Performed by: EMERGENCY MEDICINE

## 2017-08-26 PROCEDURE — S0171 BUMETANIDE 0.5 MG: HCPCS | Performed by: STUDENT IN AN ORGANIZED HEALTH CARE EDUCATION/TRAINING PROGRAM

## 2017-08-26 PROCEDURE — 94640 AIRWAY INHALATION TREATMENT: CPT | Mod: 76 | Performed by: OPTOMETRIST

## 2017-08-26 PROCEDURE — 84145 PROCALCITONIN (PCT): CPT | Performed by: EMERGENCY MEDICINE

## 2017-08-26 PROCEDURE — 85025 COMPLETE CBC W/AUTO DIFF WBC: CPT | Performed by: EMERGENCY MEDICINE

## 2017-08-26 PROCEDURE — 84484 ASSAY OF TROPONIN QUANT: CPT | Performed by: EMERGENCY MEDICINE

## 2017-08-26 PROCEDURE — 83605 ASSAY OF LACTIC ACID: CPT | Performed by: EMERGENCY MEDICINE

## 2017-08-26 PROCEDURE — 12000006 ZZH R&B IMCU INTERMEDIATE UMMC

## 2017-08-26 PROCEDURE — 80053 COMPREHEN METABOLIC PANEL: CPT | Performed by: EMERGENCY MEDICINE

## 2017-08-26 PROCEDURE — 86850 RBC ANTIBODY SCREEN: CPT | Performed by: EMERGENCY MEDICINE

## 2017-08-26 PROCEDURE — 82803 BLOOD GASES ANY COMBINATION: CPT | Performed by: STUDENT IN AN ORGANIZED HEALTH CARE EDUCATION/TRAINING PROGRAM

## 2017-08-26 PROCEDURE — 81001 URINALYSIS AUTO W/SCOPE: CPT | Performed by: STUDENT IN AN ORGANIZED HEALTH CARE EDUCATION/TRAINING PROGRAM

## 2017-08-26 PROCEDURE — 71010 XR CHEST PORT 1 VW: CPT

## 2017-08-26 PROCEDURE — 36600 WITHDRAWAL OF ARTERIAL BLOOD: CPT

## 2017-08-26 PROCEDURE — 85610 PROTHROMBIN TIME: CPT | Performed by: EMERGENCY MEDICINE

## 2017-08-26 PROCEDURE — 94660 CPAP INITIATION&MGMT: CPT

## 2017-08-26 PROCEDURE — 36415 COLL VENOUS BLD VENIPUNCTURE: CPT | Performed by: INTERNAL MEDICINE

## 2017-08-26 PROCEDURE — 83880 ASSAY OF NATRIURETIC PEPTIDE: CPT | Performed by: EMERGENCY MEDICINE

## 2017-08-26 RX ORDER — ONDANSETRON 2 MG/ML
4 INJECTION INTRAMUSCULAR; INTRAVENOUS EVERY 6 HOURS PRN
Status: DISCONTINUED | OUTPATIENT
Start: 2017-08-26 | End: 2017-09-01 | Stop reason: HOSPADM

## 2017-08-26 RX ORDER — AMLODIPINE BESYLATE 10 MG/1
10 TABLET ORAL DAILY
Status: DISCONTINUED | OUTPATIENT
Start: 2017-08-26 | End: 2017-08-26

## 2017-08-26 RX ORDER — NALOXONE HYDROCHLORIDE 0.4 MG/ML
.1-.4 INJECTION, SOLUTION INTRAMUSCULAR; INTRAVENOUS; SUBCUTANEOUS
Status: DISCONTINUED | OUTPATIENT
Start: 2017-08-26 | End: 2017-09-01 | Stop reason: HOSPADM

## 2017-08-26 RX ORDER — LACTOBACILLUS RHAMNOSUS GG 10B CELL
1 CAPSULE ORAL DAILY
Status: DISCONTINUED | OUTPATIENT
Start: 2017-08-26 | End: 2017-09-01 | Stop reason: HOSPADM

## 2017-08-26 RX ORDER — PROCHLORPERAZINE MALEATE 5 MG
5 TABLET ORAL EVERY 6 HOURS PRN
Status: DISCONTINUED | OUTPATIENT
Start: 2017-08-26 | End: 2017-09-01 | Stop reason: HOSPADM

## 2017-08-26 RX ORDER — ASPIRIN 81 MG/1
81 TABLET ORAL DAILY
Status: DISCONTINUED | OUTPATIENT
Start: 2017-08-26 | End: 2017-09-01 | Stop reason: HOSPADM

## 2017-08-26 RX ORDER — PENTOXIFYLLINE 400 MG/1
400 TABLET, EXTENDED RELEASE ORAL DAILY
Status: DISCONTINUED | OUTPATIENT
Start: 2017-08-26 | End: 2017-09-01 | Stop reason: HOSPADM

## 2017-08-26 RX ORDER — AMOXICILLIN 250 MG
1-2 CAPSULE ORAL 2 TIMES DAILY PRN
Status: DISCONTINUED | OUTPATIENT
Start: 2017-08-26 | End: 2017-09-01 | Stop reason: HOSPADM

## 2017-08-26 RX ORDER — SODIUM BICARBONATE 650 MG/1
1300 TABLET ORAL 3 TIMES DAILY
Status: DISCONTINUED | OUTPATIENT
Start: 2017-08-26 | End: 2017-09-01 | Stop reason: HOSPADM

## 2017-08-26 RX ORDER — NICOTINE POLACRILEX 4 MG
15-30 LOZENGE BUCCAL
Status: DISCONTINUED | OUTPATIENT
Start: 2017-08-26 | End: 2017-09-01 | Stop reason: HOSPADM

## 2017-08-26 RX ORDER — MEROPENEM 500 MG/1
500 INJECTION, POWDER, FOR SOLUTION INTRAVENOUS ONCE
Status: COMPLETED | OUTPATIENT
Start: 2017-08-26 | End: 2017-08-26

## 2017-08-26 RX ORDER — METOLAZONE 5 MG/1
5 TABLET ORAL ONCE
Status: DISCONTINUED | OUTPATIENT
Start: 2017-08-26 | End: 2017-08-26

## 2017-08-26 RX ORDER — DEXTROSE MONOHYDRATE 25 G/50ML
25-50 INJECTION, SOLUTION INTRAVENOUS
Status: DISCONTINUED | OUTPATIENT
Start: 2017-08-26 | End: 2017-09-01 | Stop reason: HOSPADM

## 2017-08-26 RX ORDER — PRAMIPEXOLE DIHYDROCHLORIDE 0.5 MG/1
0.5 TABLET ORAL ONCE
Status: COMPLETED | OUTPATIENT
Start: 2017-08-26 | End: 2017-08-26

## 2017-08-26 RX ORDER — LEVOFLOXACIN 5 MG/ML
750 INJECTION, SOLUTION INTRAVENOUS ONCE
Status: COMPLETED | OUTPATIENT
Start: 2017-08-26 | End: 2017-08-26

## 2017-08-26 RX ORDER — IPRATROPIUM BROMIDE AND ALBUTEROL SULFATE 2.5; .5 MG/3ML; MG/3ML
3 SOLUTION RESPIRATORY (INHALATION)
Status: DISCONTINUED | OUTPATIENT
Start: 2017-08-26 | End: 2017-08-30

## 2017-08-26 RX ORDER — ACETAMINOPHEN 325 MG/1
650 TABLET ORAL EVERY 4 HOURS PRN
Status: DISCONTINUED | OUTPATIENT
Start: 2017-08-26 | End: 2017-09-01 | Stop reason: HOSPADM

## 2017-08-26 RX ORDER — IPRATROPIUM BROMIDE AND ALBUTEROL SULFATE 2.5; .5 MG/3ML; MG/3ML
3 SOLUTION RESPIRATORY (INHALATION) EVERY 4 HOURS PRN
Status: DISCONTINUED | OUTPATIENT
Start: 2017-08-26 | End: 2017-09-01 | Stop reason: HOSPADM

## 2017-08-26 RX ORDER — PRAVASTATIN SODIUM 20 MG
40 TABLET ORAL DAILY
Status: DISCONTINUED | OUTPATIENT
Start: 2017-08-26 | End: 2017-09-01 | Stop reason: HOSPADM

## 2017-08-26 RX ORDER — PROCHLORPERAZINE 25 MG
12.5 SUPPOSITORY, RECTAL RECTAL EVERY 12 HOURS PRN
Status: DISCONTINUED | OUTPATIENT
Start: 2017-08-26 | End: 2017-09-01 | Stop reason: HOSPADM

## 2017-08-26 RX ORDER — FUROSEMIDE 10 MG/ML
40 INJECTION INTRAMUSCULAR; INTRAVENOUS ONCE
Status: COMPLETED | OUTPATIENT
Start: 2017-08-26 | End: 2017-08-26

## 2017-08-26 RX ORDER — BUMETANIDE 0.25 MG/ML
3 INJECTION INTRAMUSCULAR; INTRAVENOUS EVERY 8 HOURS
Status: DISCONTINUED | OUTPATIENT
Start: 2017-08-26 | End: 2017-08-26

## 2017-08-26 RX ORDER — METOPROLOL TARTRATE 25 MG/1
25 TABLET, FILM COATED ORAL 2 TIMES DAILY
Status: DISCONTINUED | OUTPATIENT
Start: 2017-08-26 | End: 2017-08-26

## 2017-08-26 RX ORDER — BUMETANIDE 0.25 MG/ML
2 INJECTION INTRAMUSCULAR; INTRAVENOUS EVERY 8 HOURS
Status: DISCONTINUED | OUTPATIENT
Start: 2017-08-26 | End: 2017-08-28

## 2017-08-26 RX ORDER — FERROUS SULFATE 325(65) MG
325 TABLET ORAL
Status: DISCONTINUED | OUTPATIENT
Start: 2017-08-26 | End: 2017-09-01 | Stop reason: HOSPADM

## 2017-08-26 RX ORDER — ONDANSETRON 4 MG/1
4 TABLET, ORALLY DISINTEGRATING ORAL EVERY 6 HOURS PRN
Status: DISCONTINUED | OUTPATIENT
Start: 2017-08-26 | End: 2017-09-01 | Stop reason: HOSPADM

## 2017-08-26 RX ADMIN — PENTOXIFYLLINE 400 MG: 400 TABLET, FILM COATED, EXTENDED RELEASE ORAL at 12:04

## 2017-08-26 RX ADMIN — MEROPENEM 500 MG: 500 INJECTION, POWDER, FOR SOLUTION INTRAVENOUS at 08:35

## 2017-08-26 RX ADMIN — IPRATROPIUM BROMIDE AND ALBUTEROL SULFATE 3 ML: .5; 3 SOLUTION RESPIRATORY (INHALATION) at 16:47

## 2017-08-26 RX ADMIN — PRAMIPEXOLE DIHYDROCHLORIDE 0.5 MG: 0.5 TABLET ORAL at 23:22

## 2017-08-26 RX ADMIN — FERROUS SULFATE TAB 325 MG (65 MG ELEMENTAL FE) 325 MG: 325 (65 FE) TAB at 12:05

## 2017-08-26 RX ADMIN — LEVOFLOXACIN 750 MG: 5 INJECTION, SOLUTION INTRAVENOUS at 09:11

## 2017-08-26 RX ADMIN — CALCIUM ACETATE 667 MG: 667 CAPSULE ORAL at 12:05

## 2017-08-26 RX ADMIN — BUMETANIDE 2 MG: 0.25 INJECTION INTRAMUSCULAR; INTRAVENOUS at 20:00

## 2017-08-26 RX ADMIN — ASPIRIN 81 MG: 81 TABLET, COATED ORAL at 12:04

## 2017-08-26 RX ADMIN — SODIUM BICARBONATE 650 MG TABLET 1300 MG: at 23:24

## 2017-08-26 RX ADMIN — FUROSEMIDE 40 MG: 10 INJECTION, SOLUTION INTRAVENOUS at 08:35

## 2017-08-26 RX ADMIN — METOPROLOL TARTRATE 12.5 MG: 25 TABLET, FILM COATED ORAL at 20:00

## 2017-08-26 RX ADMIN — CALCIUM ACETATE 667 MG: 667 CAPSULE ORAL at 17:48

## 2017-08-26 RX ADMIN — OYSTER SHELL CALCIUM WITH VITAMIN D 1 TABLET: 500; 200 TABLET, FILM COATED ORAL at 12:05

## 2017-08-26 RX ADMIN — Medication 1 CAPSULE: at 12:04

## 2017-08-26 RX ADMIN — IPRATROPIUM BROMIDE AND ALBUTEROL SULFATE 3 ML: .5; 3 SOLUTION RESPIRATORY (INHALATION) at 20:44

## 2017-08-26 RX ADMIN — BUMETANIDE 2 MG: 0.25 INJECTION INTRAMUSCULAR; INTRAVENOUS at 12:06

## 2017-08-26 RX ADMIN — PRAVASTATIN SODIUM 40 MG: 20 TABLET ORAL at 12:04

## 2017-08-26 RX ADMIN — VITAMIN D, TAB 1000IU (100/BT) 2000 UNITS: 25 TAB at 12:05

## 2017-08-26 RX ADMIN — SODIUM BICARBONATE 650 MG TABLET 1300 MG: at 17:48

## 2017-08-26 RX ADMIN — FUROSEMIDE 40 MG: 10 INJECTION, SOLUTION INTRAVENOUS at 07:41

## 2017-08-26 RX ADMIN — SODIUM BICARBONATE 650 MG TABLET 1300 MG: at 12:04

## 2017-08-26 ASSESSMENT — ACTIVITIES OF DAILY LIVING (ADL)
FALL_HISTORY_WITHIN_LAST_SIX_MONTHS: NO
AMBULATION: 1-->ASSISTIVE EQUIPMENT
TOILETING: 1-->ASSISTIVE EQUIPMENT
RETIRED_EATING: 0-->INDEPENDENT
RETIRED_COMMUNICATION: 0-->UNDERSTANDS/COMMUNICATES WITHOUT DIFFICULTY
WHICH_OF_THE_ABOVE_FUNCTIONAL_RISKS_HAD_A_RECENT_ONSET_OR_CHANGE?: AMBULATION;TRANSFERRING
TRANSFERRING: 1-->ASSISTIVE EQUIPMENT
BATHING: 1-->ASSISTIVE EQUIPMENT
SWALLOWING: 0-->SWALLOWS FOODS/LIQUIDS WITHOUT DIFFICULTY
DRESS: 1-->ASSISTIVE EQUIPMENT
COGNITION: 1 - ATTENTION OR MEMORY DEFICITS
CHANGE_IN_FUNCTIONAL_STATUS_SINCE_ONSET_OF_CURRENT_ILLNESS/INJURY: YES

## 2017-08-26 ASSESSMENT — ENCOUNTER SYMPTOMS
SHORTNESS OF BREATH: 1
COUGH: 1

## 2017-08-26 NOTE — IP AVS SNAPSHOT
[address] [address]    Phone:  [phone]                                       After Visit Summary   8/26/2017    Kathryn Banks    MRN: [id number]           After Visit Summary Signature Page     I have received my discharge instructions, and my questions have been answered. I have discussed any challenges I see with this plan with the nurse or doctor.    ..........................................................................................................................................  Patient/Patient Representative Signature      ..........................................................................................................................................  Patient Representative Print Name and Relationship to Patient    ..................................................               ................................................  Date                                            Time    ..........................................................................................................................................  Reviewed by Signature/Title    ...................................................              ..............................................  Date                                                            Time

## 2017-08-26 NOTE — PROGRESS NOTES
Emergency Social Work Services Note    Date of  Intervention: 08/26/17  Last Emergency Department Visit:  08/12/17  Care Plan: none  Collaborated with:  Chart review    Data:  Mrs. Kathryn Banks is a 77 year old   female who presented to ED for SOB. Mrs. Banks was recently admitted to [facility] 08/12-08/18/17 and subsequently d/c to [facility] TCU.     Intervention:  Patient transferred to the floor prior to ED SW assessment.     Assessment:  Mrs. Banks readmission d/t SOB, comes from a TCU. Full SW assessment completed 08/17/17 during her last admission.     Plan:    Anticipated Disposition:  Facility:  [facility] [phone], F: [phone], admissions: [phone], spouse transported patient last admission.    Barriers to d/c plan:  TBD - Pending patient's progress and further recommendation.`    Follow Up:  Unit SW and RN CC to follow prn for d/c planning. ED SW left message for TCU admissions with update re admission.     KRISTINA Bryant, [facility]  Social Work Services, Emergency Dept SW  [facility]  Pager: [phone] Mon-Sat 9 am - 9 pm, on-call/after hours pager [phone]

## 2017-08-26 NOTE — IP AVS SNAPSHOT
Kathryn Banks #[id number] (CSN: [id number])  (77 year old F)  (Adm: 17)     [id number]               [address] [place] [address] BANK: [phone]            Patient Demographics     Patient Name Sex          Age SSN Address Phone    Kathryn Banks Female 1940 (77 year old) [id number] [address] [place] [address] [phone] (Home)  none (Work)  none (Mobile)      Emergency Contact(s)     Name Relation Home Work Mobile    Urbano Banks Spouse [phone] none none    yoshi roth Daughter [phone] none [phone]      Admission Information     Attending Provider Admitting Provider Admission Type Admission Date/Time    El Zhao MD Webber, Gold So MD Emergency 17  0630    Discharge Date Hospital Service Auth/Cert Status Service Area     Internal Medicine [facility]    Unit Room/Bed Admission Status        U5B 5221/5221-01 Admission (Confirmed)       Admission     Complaint    Pulmonary edema      Hospital Account     Name Acct ID Class Status Primary Coverage    Kathryn Banks [id number] Inpatient Open MEDICARE - MEDICARE FOR HB SUPPLEMENT            Guarantor Account (for Hospital Account #[id number])     Name Relation to Pt Service Area Active? Acct Type    [place] barrowfrancie WEEKS  FCS Yes Personal/Family    Address Phone          [address] [place] [address] [phone](H)              Coverage Information (for Hospital Account #[id number])     1. MEDICARE/MEDICARE FOR HB SUPPLEMENT     F/O Payor/Plan Precert #    MEDICARE/MEDICARE FOR HB SUPPLEMENT     Subscriber Subscriber #    Kathryn Banks [id number]    Address Phone    ATTN CLAIMS  [address]  [facility] [place] [address] [phone]          2. BCBS/BCBS PLATINUM BLUE     F/O Payor/Plan Precert #    BCBS/BCBS PLATINUM BLUE     Subscriber Subscriber #    Kathryn Banks MICKY [id number]    Address Phone    [address] [address]  [place] [address]  "[phone]                                                      INTERAGENCY TRANSFER FORM - PHYSICIAN ORDERS   8/26/2017                       [address] [facility]: [phone]            Attending Provider: El Zhao MD     Allergies:  Ciprofloxacin, Oxycodone, Lisinopril, Sulfa Drugs    Infection:  None   Service:  INTERNAL MED    Ht:  1.626 m (5' 4\")   Wt:  73.2 kg (161 lb 6.4 oz)   Admission Wt:  84.1 kg (185 lb 6.5 oz)    BMI:  27.7 kg/m 2   BSA:  1.82 m 2            ED Clinical Impression     Diagnosis Description Comment Added By Time Added    Respiratory distress [R06.00] Respiratory distress [R06.00]  Denny Marie MD 8/26/2017  7:16 AM    Acidosis [E87.2] Acidosis [E87.2]  Denny Marie MD 8/26/2017  8:12 AM    Acute pulmonary edema (H) [J81.0] Acute pulmonary edema (H) [J81.0]  Denny Marie MD 8/26/2017  8:13 AM      Hospital Problems as of 9/1/2017              Priority Class Noted POA    Pulmonary edema Medium  8/26/2017 Yes      Non-Hospital Problems as of 9/1/2017              Priority Class Noted    Sleep apnea Medium  Unknown    Restless leg syndrome Low  Unknown    Lipoma of other skin and subcutaneous tissue Medium  9/7/2004    ESOPHAGEAL REFLUX Medium  3/28/2006    Postsurgical aortocoronary bypass status Low  1/27/2007    Iron deficiency anemia Medium  10/17/2007    History of peptic ulcer disease Low  10/17/2007    Edema Medium  1/31/2008    Kidney stone Medium  12/29/2009    Hyperlipidemia LDL goal <100 Medium  10/31/2010    Advanced directives, counseling/discussion Low  11/12/2012    CAD - NSTEMI, CABG x 5 2007, angio in 2013 with patent grafts other than occluded graft to PL Medium  2/4/2013    Hx of non-ST elevation myocardial infarction (NSTEMI) Medium  2/4/2013    Health Care Home Medium  1/20/2014    Lymphedema Medium  11/10/2014    Anemia of chronic renal failure, stage 4 (severe) (H) Medium  11/10/2014    Renal failure Medium  " 3/23/2015    Osteoarthritis of hip Medium  4/29/2015    Non morbid obesity, unspecified obesity type Medium  11/1/2015    Type 2 diabetes mellitus with other circulatory complications (H) Medium  1/19/2016    Long-term (current) use of anticoagulants [Z79.01] Medium  3/4/2016    Acute renal failure with tubular necrosis (H) Medium  7/2/2016    Essential hypertension with goal blood pressure less than 140/90 Medium  9/13/2016    Atrial fibrillation with rapid ventricular response (H) Medium  4/28/2017    Rash - redness medial thighs Medium  4/29/2017    Metabolic acidosis, normal anion gap (NAG) Medium  4/30/2017    Pulmonary hypertension (H) Medium  5/24/2017    Chronic heart failure (H) with preserved EF Medium  5/31/2017    Atrial flutter (H) - present 6/12 Medium  5/31/2017    Generalized edema - anasarca Medium  6/1/2017    Hypertensive heart and chronic kidney disease with heart failure and stage 1 through stage 4 chronic kidney disease, or unspecified chronic kidney disease (H) Medium  6/1/2017    Supratherapeutic INR Medium  6/1/2017    Proteinuria 2.1 g/g Cr Medium  6/2/2017    Bradycardia Medium  6/2/2017    Acute on chronic renal failure (H) Medium  6/12/2017    Memory loss Medium  6/12/2017    Chronic atrial fibrillation (H) on 6/12-6/13 Medium  6/12/2017    Blood in stool Medium  6/14/2017    Microscopic hematuria Medium  6/21/2017    Acute kidney injury (nontraumatic) (H) Medium  6/21/2017    CHF (congestive heart failure) (H) Medium  7/6/2017    Anemia Medium  7/19/2017    Anemia, iron deficiency Medium  8/9/2017    Acidosis Medium  8/12/2017      Code Status History     Date Active Date Inactive Code Status Order ID Comments User Context    9/1/2017 11:47 AM  Full Code [id number]  Mike Tadeo MD Outpatient    8/26/2017  9:59 AM 9/1/2017 11:47 AM Full Code [id number]  Cameron Burrell MD Inpatient    8/18/2017 11:32 AM 8/26/2017  9:59 AM Full Code [id number]  [place] MD Outpatient     8/12/2017  4:31 PM 8/18/2017 11:32 AM Full Code [id number]  Gilda Muro MD Inpatient    7/11/2017  3:54 PM 8/12/2017  4:31 PM Full Code [id number]  Damian Contreras MD Outpatient    7/6/2017 10:16 PM 7/11/2017  3:54 PM Full Code [id number]  Maegan Xavier MD Inpatient    6/21/2017  2:14 PM 6/25/2017  3:28 PM Full Code [id number]  Erin Pinedo MD Inpatient    6/15/2017  4:01 PM 6/21/2017  2:14 PM Full Code [id number]  Arash Silva MD Outpatient    6/12/2017  8:17 PM 6/15/2017  4:01 PM Full Code [id number]  Mike Wong MD Inpatient    6/6/2017  1:12 PM 6/12/2017  8:17 PM Full Code [id number]  Kenji Fish MD Outpatient    5/31/2017  9:20 PM 6/6/2017  1:12 PM Full Code [id number]  Mike Wong MD Inpatient    5/2/2017 10:00 AM 5/31/2017  9:20 PM Full Code [id number]  Jian Hartley MD Outpatient    4/28/2017  7:16 PM 5/2/2017 10:00 AM Full Code [id number]  Jian Hartley MD Inpatient    7/6/2016  1:46 PM 4/28/2017  7:16 PM Full Code [id number]  Isadora Mosley PA-C Outpatient    7/6/2016 10:41 AM 7/6/2016  1:46 PM Full Code [id number]  Arash Silva MD Outpatient    7/3/2016 11:12 AM 7/6/2016 10:41 AM Full Code [id number]  Isadora Mosley PA-C Inpatient    7/2/2016  5:08 AM 7/3/2016 11:12 AM Full Code [id number]  Mike Wong MD Inpatient    3/23/2015  7:40 PM 3/25/2015  1:40 PM Full Code [id number]  Luisa Adan MD Inpatient    12/28/2014  9:42 AM 3/23/2015  7:40 PM Full Code [id number]  Jian Hartley MD Outpatient    12/27/2014  4:26 PM 12/28/2014  9:42 AM Full Code [id number]  Mick Dale MD ED    12/9/2014  2:02 PM 12/27/2014  4:26 PM Full Code [id number]  Jian Hartley MD Outpatient    11/13/2014 11:04 AM 12/9/2014  2:02 PM Full Code [id number]  Jian Hartley MD Outpatient    11/10/2014  6:44 PM 11/13/2014 11:04 AM Full Code [id number]  Mike Wong MD Inpatient    9/14/2014  1:12 PM 11/10/2014  6:44 PM Full Code  [id number]  Jian Hartley MD Outpatient    9/12/2014  1:02 PM 9/14/2014  1:12 PM Full Code [id number]  Radha Field PA-C Inpatient    7/20/2014  9:02 AM 9/12/2014  1:02 PM Full Code [id number]  Cesar Lafleru MD Outpatient    7/15/2014  5:49 PM 7/20/2014  9:02 AM Full Code [id number]  Radha Field PA-C Inpatient    2/22/2014  4:21 AM 2/22/2014  2:33 PM Full Code [id number]  Mike Ogden MD ED    1/17/2014  8:18 PM 1/18/2014 12:34 PM Full Code [id number]  Judson Gan RN Inpatient    2/1/2013  2:01 PM 1/17/2014  8:18 PM Full Code [id number]  Nadira Mccann MD Outpatient    2/1/2013 12:18 AM 2/1/2013  2:01 PM Full Code [id number]  Rajinder Dwyer MD Inpatient    1/31/2013  9:39 PM 2/1/2013 12:18 AM Full Code [id number]  Arash Silva MD Outpatient    1/31/2013  7:09 PM 1/31/2013  9:39 PM Full Code [id number]  Radha Field PA-C Inpatient    7/10/2008  2:44 PM 1/31/2013  7:09 PM None None HEALTH CARE DIRECTIVE ON FILE 7-3-08 Rubens Melina Demographics      Current Code Status     Date Active Code Status Order ID Comments User Context       Prior      Summary of Visit     Reason for your hospital stay       You were hospitalized for difficulty breathing due to fluid in your lungs.  You were given medications to make you urinate and your lung fluid/breathing improved.                Medication Review      START taking        Dose / Directions Comments    calcitRIOL 0.5 MCG capsule   Commonly known as:  ROCALTROL   Used for:  Constitutional chronic hypocalcemia        Dose:  0.5 mcg   Take 1 capsule (0.5 mcg) by mouth daily   Refills:  0        fluticasone 50 MCG/ACT spray   Commonly known as:  FLONASE   Used for:  Nasal congestion        Dose:  1 spray   Spray 1 spray into both nostrils daily   Quantity:  1 Bottle   Refills:  0        guaiFENesin-dextromethorphan 100-10 MG/5ML syrup   Commonly known as:  ROBITUSSIN DM   Used for:  Cough        Dose:  5 mL   Take 5 mLs by mouth every 8 hours as  needed for cough or congestion   Quantity:  560 mL   Refills:  0        warfarin 2.5 MG tablet   Commonly known as:  COUMADIN   Used for:  Atrial fibrillation with rapid ventricular response (H)        Dose:  2.5 mg   Take 1 tablet (2.5 mg) by mouth daily   Quantity:  30 tablet   Refills:  0          CONTINUE these medications which may have CHANGED, or have new prescriptions. If we are uncertain of the size of tablets/capsules you have at home, strength may be listed as something that might have changed.        Dose / Directions Comments    torsemide 5 MG tablet   Commonly known as:  DEMADEX   This may have changed:  when to take this   Used for:  Chronic diastolic congestive heart failure (H)        Dose:  20 mg   Take 4 tablets (20 mg) by mouth 2 times daily   Refills:  0          CONTINUE these medications which have NOT CHANGED        Dose / Directions Comments    ACETAMINOPHEN PO        Dose:  650 mg   Take 650 mg by mouth every 4 hours as needed for pain For pain 1-5 out of 10   Refills:  0        acidophilus tablet   Used for:  Urinary tract infection without hematuria, site unspecified, Cellulitis of lower extremity, unspecified laterality        Dose:  1 tablet   Take 1 tablet by mouth daily   Refills:  0        amLODIPine 5 MG tablet   Commonly known as:  NORVASC   Used for:  Benign essential hypertension        Dose:  10 mg   Take 2 tablets (10 mg) by mouth daily   Quantity:  30 tablet   Refills:  3        aspirin 81 MG tablet   Used for:  Coronary artery disease involving native coronary artery of native heart without angina pectoris        Dose:  81 mg   Take 1 tablet (81 mg) by mouth daily   Quantity:  30 tablet   Refills:  3        calcium acetate 667 MG Caps capsule   Commonly known as:  PHOSLO   Used for:  Chronic kidney disease, unspecified CKD stage        Dose:  667 mg   Take 1 capsule (667 mg) by mouth 3 times daily (with meals)   Quantity:  180 capsule   Refills:  0        Calcium  Carb-Cholecalciferol 500-400 MG-UNIT Tabs        Dose:  1 tablet   Take 1 tablet by mouth 2 times daily   Refills:  0        CEPHALEXIN PO   Indication:  Urinary Tract Infection        Dose:  500 mg   Take 500 mg by mouth 3 times daily X 10 days for UTI, started 8/25   Refills:  0        cyanocobalamin 1000 MCG/ML injection   Commonly known as:  VITAMIN B12        Dose:  1 mL   Inject 1 mL into the muscle every 30 days   Refills:  0        ferrous sulfate 325 (65 FE) MG tablet   Commonly known as:  IRON   Used for:  Iron deficiency anemia secondary to inadequate dietary iron intake        Dose:  325 mg   Take 1 tablet (325 mg) by mouth daily (with breakfast)   Quantity:  100 tablet   Refills:  0        gabapentin 300 MG capsule   Commonly known as:  NEURONTIN   Used for:  Diabetic polyneuropathy associated with type 2 diabetes mellitus (H)        Dose:  300 mg   Take 1 capsule (300 mg) by mouth At Bedtime   Quantity:  90 capsule   Refills:  0        ipratropium - albuterol 0.5 mg/2.5 mg/3 mL 0.5-2.5 (3) MG/3ML neb solution   Commonly known as:  DUONEB        Dose:  1 vial   Take 1 vial by nebulization 3 times daily   Refills:  0        metoprolol 50 MG tablet   Commonly known as:  LOPRESSOR   Used for:  Atrial fibrillation with rapid ventricular response (H)        Dose:  25 mg   Take 0.5 tablets (25 mg) by mouth 2 times daily   Quantity:  180 tablet   Refills:  3        nitroGLYcerin 0.4 MG sublingual tablet   Commonly known as:  NITROSTAT   Used for:  Hx of non-ST elevation myocardial infarction (NSTEMI), Atherosclerosis of native coronary artery of native heart without angina pectoris, Postsurgical aortocoronary bypass status        Dose:  0.4 mg   Place 1 tablet (0.4 mg) under the tongue every 5 minutes as needed for chest pain   Quantity:  25 tablet   Refills:  0        order for DME   Used for:  ANABEL (obstructive sleep apnea)        Equipment being ordered: cpap machine, mask, humidifier, tubing and filters    Quantity:  1 Device   Refills:  0        pentoxifylline 400 MG CR tablet   Commonly known as:  TRENtal   Used for:  Venous stasis ulcer (H)        Dose:  400 mg   Take 1 tablet (400 mg) by mouth daily   Refills:  0        pravastatin 40 MG tablet   Commonly known as:  PRAVACHOL   Used for:  Hx of non-ST elevation myocardial infarction (NSTEMI), Atherosclerosis of native coronary artery of native heart without angina pectoris, Type 2 diabetes mellitus with other circulatory complications (H)        Dose:  40 mg   Take 1 tablet (40 mg) by mouth daily   Quantity:  90 tablet   Refills:  3        sodium bicarbonate 650 MG tablet   Used for:  Chronic kidney disease, unspecified CKD stage        Dose:  1300 mg   Take 2 tablets (1,300 mg) by mouth 3 times daily   Refills:  0        TRAMADOL HCL PO        Dose:  50 mg   Take 50 mg by mouth every 6 hours as needed for moderate to severe pain Pain of 6-10 out of 10   Refills:  0          STOP taking     cholecalciferol 2000 UNITS tablet                   After Care     Activity - Up ad beata           Advance Diet as Tolerated       Follow this diet upon discharge: Orders Placed This Encounter      Room Service      Renal Diet (non-dialysis)       Daily weights       Call Provider for weight gain of more than 2 pounds per day or 5 pounds per week.       General info for SNF       Length of Stay Estimate: Short Term Care: Estimated # of Days <30  Condition at Discharge: Improving  Level of care:skilled   Rehabilitation Potential: Good  Admission H&P remains valid and up-to-date: Yes  Recent Chemotherapy: N/A  Use Nursing Home Standing Orders: Yes       Intake and output       Every shift       Mantoux instructions       Give two-step Mantoux (PPD) Per Facility Policy Yes             General Recommendations To Control Heart Failure When You Get Home (Give at DC from TCU)     Instructions To Patients and Families: Please read and check off each of these important instructions as  "you do them when you get home.           Weight and symptoms      ___ Put a scale in your bathroom  ___ Post a weight chart or calendar next to the scale  ___Weigh yourself every day as soon as you you get up in the morning. You should only be wearing your pajamas. Write your weight on the chart/calendar.  ___ Bring your weight chart/calendar with you to all appointments    ___Call your doctor if you gain 2 pounds in 1 day or 5 pounds in 1 week from your \"dry\" weight (baseline weight). Also call your doctor if you have shortness of breath that gets worse over time, leg swelling or fatigue.         Medicines and diet     ___ Make sure to take your medicines as prescribed.    ___Bring a current list of your medicines and all of your medicine bottles with you to all appointments.    ___ Limit fluids if you still have swelling or shortness of breath, or if your doctor tells you to do so.  ___ Eat less than 2000 mg of sodium (salt) every day. Read food labels, and do not add salt to meals.   ___ Heart healthy diet with low fat and low cholesterol          Activity and suggested lifestyle changes    ___ Stay active. Talk to your doctor about an exercise program that is safe for your heart.    ___ Stop smoking. Reduce alcohol use.      ___ Lose weight if you are overweight. Extra weight puts a lot of stress on the heart.          Control for Leg Swelling   ___ Keep your legs elevated (raised) as needed for swelling. If swelling is uncomfortable or elevation doesn t help, ask your doctor about using ACE wrap or Jobst stockings.          Follow-up appointments   ___ Make a [facility] appointment with a basic metabolic panel lab draw 3 to 5 days after you leave the hospital. Call one of the following locations:   [facility]  [phone],  [facility] [phone],  [facility]  [phone].     ___ Make sure to take your " medications as prescribed and bring an accurate list of your medications and your weight chart/calendar to your follow up appointment at the [facility] Clinic for continued education and adjustments          What is the CORE clinic?    The [facility].O.R.E (Cardiomyopathy, Optimization, Rehabilitation, Education) Clinic is a heart failure specialty clinic within the [facility]. At C.O.R.E[facility] you will work with nurse practitioners to carefully adjust medicines, get education and learn who and when to call if symptoms appear. [facility]R.[facility] nurses specialize in helping you:    better understand your disease.    slow the progress of your disease.    improve the length and quality of your life.    detect future heart problems before they become life threatening.    avoid hospital stays.            Lab Orders     INR                 Referrals     Medication Therapy Management Referral       Reason for referral:  on more than 5 medications and managing chronic disease and on more than 10 medications and hospitalized or in the ED in the past 6 months     This service is designed to help you get the most from your medications.  A specially trained pharmacist will work closely with you and your doctors  to solve any problems related to your medications and to help you get the   best results from taking them.      The Medication Therapy Management staff will call you to schedule an appointment.             Follow-Up Appointment Instructions     Follow Up and recommended labs and tests       Follow up with [facility] in 2 weeks.  The following labs/tests are recommended: BMP.             Your next 10 appointments already scheduled     Sep 06, 2017  7:30 AM CDT   Lab with  LAB    Health Lab ([facility])    [address] [place] [id number]   [phone]            Sep 06, 2017  8:00 AM CDT   (Arrive by 7:45 AM)   MADYSON GARCIA with CLAUDIA Tam  [facility] ([facility])    [address]  3rd Floor  [facility] [address] [phone]            Sep 15, 2017  1:30 PM CDT   Office Visit with Dheeraj Jj [facility]   [facility]    [address] [place] [id number] [phone]           Bring a current list of meds and any records pertaining to this visit. For Physicals, please bring immunization records and any forms needing to be filled out. Please arrive 10 minutes early to complete paperwork.            Sep 19, 2017 11:30 AM CDT   LAB with LAB FIRST FLOOR [facility] ([facility])    [address] [place] [address] [phone]           Patient must bring picture ID. Patient should be prepared to give a urine specimen  Please do not eat 10-12 hours before your appointment if you are coming in fasting for labs on lipids, cholesterol, or glucose (sugar). Pregnant women should follow their Care Team instructions. Water with medications is okay. Do not drink coffee or other fluids. If you have concerns about taking  your medications, please ask at office or if scheduling via NetClarity[employer], send a message by clicking on Secure Messaging, Message Your Care Team.            Sep 19, 2017 12:00 PM CDT   [facility] Visit with Vibha Barfield MD   [facility]    [address] [place] [address] [address] [facility] [id number] [phone]              Statement of Approval     Ordered          09/01/17 1150  I have reviewed and agree with all the recommendations and orders detailed in this document.  EFFECTIVE NOW     Approved and electronically signed by:  Mike Tadeo MD                                                 INTERAGENCY TRANSFER FORM - NURSING   8/26/2017                       [address] [facility] BANK: [phone]            Attending Provider: El Zhao MD      "Allergies:  Ciprofloxacin, Oxycodone, Lisinopril, Sulfa Drugs    Infection:  None   Service:  INTERNAL MED    Ht:  1.626 m (5' 4\")   Wt:  73.2 kg (161 lb 6.4 oz)   Admission Wt:  84.1 kg (185 lb 6.5 oz)    BMI:  27.7 kg/m 2   BSA:  1.82 m 2            Advance Directives        Does patient have a scanned Advance Directive/ACP document in EPIC?           Yes        Immunizations     Name Date      HepA-Ped 2 dose 10/13/98     HepA-Ped 2 dose 03/11/98     HepB-Peds 09/25/00     HepB-Peds 10/19/99     HepB-Peds 08/10/99     Influenza (High Dose) 3 valent vaccine 09/13/16     Influenza (High Dose) 3 valent vaccine 09/23/15     Influenza (High Dose) 3 valent vaccine 09/24/14     Influenza (High Dose) 3 valent vaccine 12/06/13     Influenza (High Dose) 3 valent vaccine 09/25/12     Influenza (IIV3) 10/01/10     Influenza (IIV3) 10/21/09     Influenza (IIV3) 12/03/08     Influenza (IIV3) 12/06/07     Influenza (IIV3) 04/02/07     Influenza (IIV3) 11/29/06     Influenza (IIV3) 11/03/05     Influenza (IIV3) 10/29/04     Influenza (IIV3) 11/10/03     Influenza (IIV3) 10/16/02     Influenza (IIV3) 11/16/01     Meningococcal (Menomune ) 04/02/07     Pneumococcal (PCV 13) 09/13/16     Pneumococcal 23 valent 11/17/14     Pneumococcal 23 valent 12/03/08     Poliovirus, inactivated (IPV) 04/02/07     TD (ADULT, 7+) 10/31/10     TD (ADULT, 7+) 08/07/00     TD (ADULT, 7+) 06/20/96     Typhoid IM 04/02/07     Yellow Fever 04/02/07     Zoster vaccine, live 11/01/11       ASSESSMENT     Discharge Profile Flowsheet     DISCHARGE NEEDS ASSESSMENT     Resources List  Home Care 08/29/17 1104    Concerns To Be Addressed  compliance issue concerns 08/29/17 1104   Other Resources  Home Care 08/29/17 1104    Concerns Comments  ? new HD, INR next check 08/29/17 1104   PAS Number  [id number] 08/29/17 1104    Equipment Currently Used at Home  grab bar;walker, rolling;dressing device 08/29/17 1104   Senior Linkage Line Referral Placed  07/02/16 " "08/29/17 1104    Transportation Available  family or friend will provide 08/29/17 0949   F/U Appointment Brochure Provided  07/02/16 08/29/17 1104    # of Referrals Placed by [facility]  Homecare 06/13/17 1419   Existing Resources/Services  Home Care 11/18/14 1451    Key Recommendations  TBD after surgery 10/06/16 1312   SKIN      Does Patient Need a Referral for Clinic CC  No 03/24/15 1445   Inspection of bony prominences  Full 09/01/17 0959    Equipment Used at Home  walker, rolling 07/15/14 1946   Full except areas not inspected   Spine;Hip, left;Hip, right;Buttock, left;Buttock, right;Sacrum;Coccyx 08/28/17 1233    FUNCTIONAL LEVEL CURRENT     Skin WDL  ex (venous stasis ulcers on lower extremities) 09/01/17 0959    Change in Functional Status Since Onset of Current Illness/Injury  yes 08/26/17 1829   Skin Color/Characteristics  bruised (ecchymotic);kailash;redness blanchable 09/01/17 0959    GASTROINTESTINAL (ADULT,PEDIATRIC,OB)     Skin Temperature  warm 09/01/17 0959    GI WDL  ex;GI symptoms 09/01/17 0959   Skin Moisture  dry 09/01/17 0959    Abdominal Appearance  rounded 08/31/17 0941   Skin Elasticity  quick return to original state 09/01/17 0520    Last Bowel Movement  08/31/17 09/01/17 0520   Skin Integrity  bruise(s);excoriation;scab(s);scar(s);wound(s) 09/01/17 0959    GI Signs/Symptoms  diarrhea 09/01/17 0959   SAFETY      COMMUNICATION ASSESSMENT     Safety WDL  WDL 09/01/17 0959    Patient's communication style  spoken language (English or Bilingual) 08/26/17 0630   Safety Factors  upper side rails raised x 2;bed in low position;wheels locked;call light in reach;ID band on 09/01/17 0959    FINAL RESOURCES                        Assessment WDL (Within Defined Limits) Definitions           Safety WDL     Effective: 09/28/15    Row Information: <b>WDL Definition:</b> Bed in low position, wheels locked; call light in reach; upper side rails up x 2; ID band on<br> <font color=\"gray\"><i>Item=AS safety wdl>>List=AS " "safety wdl>>Version=F14</i></font>      Skin WDL     Effective: 09/28/15    Row Information: <b>WDL Definition:</b> Warm; dry; intact; elastic; without discoloration; pressure points without redness<br> <font color=\"gray\"><i>Item=AS skin wdl>>List=AS skin wdl>>Version=F14</i></font>      Vitals     Vital Signs Flowsheet     QUICK ADDS     Lorna Coma Scale Score  15 08/31/17 1631    Quick Adds  Comments 09/01/17 1058   HEIGHT AND WEIGHT      COMMENTS     Height  -- 08/29/17 1836    Comments  PT  09/01/17 1110   Weight  73.2 kg (161 lb 6.4 oz) 08/31/17 2012    VITAL SIGNS     Weight Method  Bed scale 08/29/17 0246    Temp  97.7  F (36.5  C) 09/01/17 0608   BSA (Calculated - sq m)  -- 08/29/17 1836    Temp src  Oral 09/01/17 0608   BMI (Calculated)  -- 08/29/17 1836    Resp  16 09/01/17 0608   POSITIONING      Pulse  69 09/01/17 1058   Body Position  independently positioning 09/01/17 0959    Heart Rate  69 09/01/17 1110   Head of Bed (HOB)  HOB at 20-30 degrees 09/01/17 0959    Pulse/Heart Rate Source  Monitor 08/31/17 1626   Positioning/Transfer Devices  pillows 08/31/17 1631    BP  106/50 09/01/17 1110   Chair  Recline and up in chair 08/29/17 1133    BP Location  Left arm 09/01/17 0608   DAILY CARE      OXYGEN THERAPY     Activity Type  activity encouraged 09/01/17 0520    SpO2  95 % 09/01/17 1104   Activity Level of Assistance  independent 09/01/17 0520    O2 Device  Nasal cannula 09/01/17 1104   Activity Assistive Device  walker 09/01/17 0520    FiO2 (%)  35 % 08/30/17 2156   ECG      Oxygen Delivery  2 LPM 09/01/17 1104   ECG Rhythm  Sinus rhythm 08/29/17 1133    PAIN/COMFORT     Ectopy  None 08/29/17 1133    Patient Currently in Pain  denies 08/31/17 1631   Ectopy Frequency  Rare 08/28/17 1224    Preferred Pain Scale  CAPA (Clinically Aligned Pain Assessment) ([facility] Adults Only) 08/31/17 1631   Lead Monitored  Lead II 08/28/17 4384    CLINICALLY ALIGNED PAIN ASSESSMENT (CAPA) ([facility] " AND [facility] ADULTS ONLY)     EKG MONITORING      Comfort  comfortably manageable 08/31/17 0917   Cardiac Regularity  Regular 08/26/17 0847    Change in Pain  about the same 08/31/17 0917   Cardiac Rhythm  NSR 08/26/17 0847    Pain Control  fully effective 08/31/17 0917   POINT OF CARE TESTING      Functioning  can do most things, but pain gets in the way of some 08/31/17 0917   Puncture Site  fingertip 08/28/17 2147    Sleep  normal sleep 08/31/17 0917   Bedside Glucose (mg/dl )   185 mg/dl 08/28/17 2147    BRIANNE COMA SCALE     ANALGESIA SIDE EFFECTS MONITORING      Best Eye Response  4-->(E4) spontaneous 08/31/17 1631   Side Effects Monitoring: Respiratory Quality  R 08/31/17 0917    Best Motor Response  6-->(M6) obeys commands 08/31/17 1631   Side Effects Monitoring: Respiratory Depth  N 08/31/17 0917    Best Verbal Response  5-->(V5) oriented 08/31/17 1631   Side Effects Monitoring: Sedation Level  1 08/31/17 0917            Patient Lines/Drains/Airways Status    Active LINES/DRAINS/AIRWAYS     Name: Placement date: Placement time: Site: Days: Last dressing change:    Peripheral IV 08/27/17 Left;Anterior Lower forearm 08/27/17   1241   Lower forearm   5     Wound 04/28/17 Lateral;Left Hip Surgical from previous surgery 04/28/17   1542   Hip   125     Wound 04/28/17 Lower;Left Leg Other (comment) scab dime size LLE 04/28/17   1630   Leg   125     Wound 05/31/17 Left Calf Other (comment) open blisters  05/31/17   2011   Calf   92     Wound 06/12/17 Posterior;Right Calf Ulceration 06/12/17   2100   Calf   80     Wound 06/21/17 Left Calf Abrasion(s) 06/21/17   1531   Calf   71     Wound 07/06/17 Anterior Leg Abrasion(s) quarter sized, weaping 07/06/17 2028   Leg   56     Wound 08/12/17 Bilateral Leg Ulceration;Other (comment)  scattered open ulcerations on lower legs bilateral posterior and anterior  08/12/17   1901   Leg   19     Wound 08/12/17 Bilateral Labia excoration, skin intact 08/12/17   1600    Labia   19     Rash 05/31/17 2013 Left breast patch 05/31/17 2013    92             Patient Lines/Drains/Airways Status    Active PICC/CVC     None            Intake/Output Detail Report     Date Intake       Output   Net    Shift P.O. I.V. IV Piggyback Blood Components Total Urine Stool Total       Day 08/31/17 0000 - 08/31/17 0659 -- -- -- -- -- -- -- -- 0    Janki 08/31/17 0700 - 08/31/17 1459 720 -- -- -- 720 1400 -- 1400 -680    Noc 08/31/17 1500 - 08/31/17 2359 540 -- -- -- 540 -- -- -- 540    Day 09/01/17 0000 - 09/01/17 0659 0 -- -- -- -- 400 -- 400 -400    Janki 09/01/17 0700 - 09/01/17 1459 200 -- -- -- 200 -- -- -- 200      Last Void/BM       Most Recent Value    Urine Occurrence 1 at 09/01/2017 0950    Stool Occurrence 1 at 09/01/2017 0950      Case Management/Discharge Planning     Case Management/Discharge Planning Flowsheet     REFERRAL INFORMATION     Equipment Used at Home  walker, rolling 07/15/14 1946    Arrived From  home or self-care;home healthcare service 06/13/17 1417   FINAL RESOURCES      # of Referrals Placed by [facility]  Homecare 06/13/17 1419   Equipment Currently Used at Home  grab bar;walker, rolling;dressing device 08/29/17 1104    Primary Care Clinic Name  [facility] 06/01/17 1318   Resources List  Home Care 08/29/17 1104    Primary Care MD Name  [facility] 06/01/17 1318   Other Resources  Home Care 08/29/17 1104    LIVING ENVIRONMENT     PAS Number  [id number] 08/29/17 1104    Lives With  friend(s);grandchild(zoe);spouse 08/29/17 0949   [facility] Link[facility] Line Referral Placed  07/02/16 08/29/17 1104    Living Arrangements  house 08/29/17 0949   F/U Appointment Brochure Provided  07/02/16 08/29/17 1104    Provides Primary Care For  no one 07/06/17 2050   Existing Resources/Services  Home Care 11/18/14 1451    COPING/STRESS     / CAREGIVER      Major Change/Loss/Stressor  hospitalization 08/26/17 1816   Filed Complexity Screen Score  11 08/28/17 1214    Patient Personal Strengths   motivated;resilient;expressive of emotions;expressive of needs 06/13/17 1419   ABUSE RISK SCREEN      ASSESSMENT/CONCERNS TO BE ADDRESSED     QUESTION TO PATIENT:  Has a member of your family or a partner(now or in the past) intimidated, hurt, manipulated, or controlled you in any way?  no 08/26/17 0633    Concerns To Be Addressed  compliance issue concerns 08/29/17 1104   QUESTION TO PATIENT: Do you feel safe going back to the place where you are living?  yes 08/26/17 0633    Concerns Comments  ? new HD, INR next check 08/29/17 1104   OBSERVATION: Is there reason to believe there has been maltreatment of a vulnerable adult (ie. Physical/Sexual/Emotional abuse, self neglect, lack of adequate food, shelter, medical care, or financial exploitation)?  no 08/26/17 0633    DISCHARGE PLANNING     (R) MENTAL HEALTH SUICIDE RISK      Transportation Available  family or friend will provide 08/29/17 0949   Are you depressed or being treated for depression?  No 08/26/17 1815    Key Recommendations  TBD after surgery 10/06/16 1312   HOMICIDE RISK      Does Patient Need a Referral for Clinic CC  No 03/24/15 1445   Homicidal Ideation  no 08/26/17 0633    Outpatient/Agency/Support Group Needs  inpatient rehabilitation facility (specify) 06/14/17 1507                       [address] [facility] BANK: [phone]            Medication Administration Report for Kathryn Banks as of 09/01/17 1421   Legend:    Given Hold Not Given Due Canceled Entry Other Actions    Time Time (Time) Time  Time-Action       Inactive    Active    Linked        Medications 08/26/17 08/27/17 08/28/17 08/29/17 08/30/17 08/31/17 09/01/17    acetaminophen (TYLENOL) tablet 650 mg  Dose: 650 mg Freq: EVERY 4 HOURS PRN Route: PO  PRN Comment: fever pain  Start: 08/26/17 0955   Admin Instructions: Maximum acetaminophen dose from all sources = 75 mg/kg/day not to exceed 4 grams/day.               amLODIPine (NORVASC) tablet 5 mg  Dose: 5 mg Freq: DAILY Route:  PO  Start: 08/30/17 1115        1253 (5 mg)-Given        0824 (5 mg)-Given        0933 (5 mg)-Given           aspirin EC EC tablet 81 mg  Dose: 81 mg Freq: DAILY Route: PO  Start: 08/26/17 1100    1204 (81 mg)-Given        0900 (81 mg)-Given        0822 (81 mg)-Given        1012 (81 mg)-Given        0810 (81 mg)-Given        0824 (81 mg)-Given        0933 (81 mg)-Given           calcitRIOL (ROCALTROL) capsule 0.5 mcg  Dose: 0.5 mcg Freq: DAILY Route: PO  Start: 08/31/17 1230         1305 (0.5 mcg)-Given        0933 (0.5 mcg)-Given           calcium acetate (PHOSLO) capsule 667 mg  Dose: 667 mg Freq: 3 TIMES DAILY WITH MEALS Route: PO  Start: 08/26/17 1200   Admin Instructions: Best if given with meals. Take with meals.     1205 (667 mg)-Given       1748 (667 mg)-Given        0900 (667 mg)-Given       1333 (667 mg)-Given       1729 (667 mg)-Given        0822 (667 mg)-Given       1220 (667 mg)-Given       1657 (667 mg)-Given               1017 (667 mg)-Given       1152 (667 mg)-Given       1757 (667 mg)-Given        0810 (667 mg)-Given       1253 (667 mg)-Given       1810 (667 mg)-Given        0825 (667 mg)-Given       1305 (667 mg)-Given       1823 (667 mg)-Given        0934 (667 mg)-Given       1257 (667 mg)-Given       [ ] 1800           calcium carb 1250 mg (500 mg [place])/vitamin D 200 units (OSCAL with D) per tablet 1 tablet  Dose: 1 tablet Freq: 2 TIMES DAILY WITH MEALS Route: PO  Start: 08/31/17 1800         1823 (1 tablet)-Given        0934 (1 tablet)-Given       [ ] 1800           ferrous sulfate (IRON) tablet 325 mg  Dose: 325 mg Freq: DAILY WITH BREAKFAST Route: PO  Start: 08/26/17 1100   Admin Instructions: Absorbed best on an empty stomach. If stomach upset occurs, can take with meals.     1205 (325 mg)-Given        0901 (325 mg)-Given        0821 (325 mg)-Given        1011 (325 mg)-Given        0811 (325 mg)-Given        0826 (325 mg)-Given        0933 (325 mg)-Given           fluticasone (FLONASE) 50  MCG/ACT spray 1 spray  Dose: 1 spray Freq: DAILY Route: BOTH NOSTRIL  Start: 09/01/17 0930          (1154)-Not Given       1256 (1 spray)-Given           glucose 40 % gel 15-30 g  Dose: 15-30 g Freq: EVERY 15 MIN PRN Route: PO  PRN Reason: low blood sugar  Start: 08/26/17 1048   Admin Instructions: Give 15 g for BG 51 to 69 mg/dL IF patient is conscious and able to swallow. Give 30 g for BG less than or equal to 50 mg/dL IF patient is conscious and able to swallow. Do NOT give glucose gel via enteral tube.  IF patient has enteral tube: give apple juice 120 mL (4 oz or 15 g of CHO) via enteral tube for BG 51 to 69 mg/dL.  Give apple juice 240 mL (8 oz or 30 g of CHO) via enteral tube for BG less than or equal to 50 mg/dL.    ~Oral gel is preferable for conscious and able to swallow patient.   ~IF gel unavailable or patient refuses may provide apple juice 120 mL (4 oz or 15 g of CHO). Document juice on I and O flowsheet.              Or  dextrose 50 % injection 25-50 mL  Dose: 25-50 mL Freq: EVERY 15 MIN PRN Route: IV  PRN Reason: low blood sugar  Start: 08/26/17 1048   Admin Instructions: Use if have IV access, BG less than 70 mg/dL and meet dose criteria below:  Dose if conscious and alert (or disorientated) and NPO = 25 mL  Dose if unconscious / not alert = 50 mL  Vesicant.              Or  glucagon injection 1 mg  Dose: 1 mg Freq: EVERY 15 MIN PRN Route: SC  PRN Reason: low blood sugar  PRN Comment: May repeat x 1 only  Start: 08/26/17 1048   Admin Instructions: May give SQ or IM. ONLY use glucagon IF patient has NO IV access AND is UNABLE to swallow AND blood glucose is LESS than or EQUAL to 50 mg/dL.               guaiFENesin-dextromethorphan (ROBITUSSIN DM) 100-10 MG/5ML syrup 5 mL  Dose: 5 mL Freq: EVERY 8 HOURS PRN Route: PO  PRN Reason: cough  Start: 08/30/17 0258        1249 (5 mL)-Given       2104 (5 mL)-Given        0817 (5 mL)-Given            insulin aspart (NovoLOG) inj (RAPID ACTING)  Dose: 1-3 Units  Freq: AT BEDTIME Route: SC  Start: 08/26/17 2200   Admin Instructions: LOW INSULIN RESISTANCE DOSING    Do Not give Bedtime Correction Insulin if BG less than 200.   For  - 299 give 1 unit.   For  - 399 give 2 units   For BG greater than or equal 400 give 3 units.   Notify provider if glucose greater than or equal to 350 mg/dL after administration of correction dose.  If given at mealtime, must be administered 5 min before meal or immediately after.     2158 (1 Units)-Given [C]        (2144)-Not Given        (2126)-Not Given [C]        2217 (2 Units)-Given        (2107)-Not Given        (2125)-Not Given        [ ] 2200           insulin aspart (NovoLOG) inj (RAPID ACTING)  Dose: 1-3 Units Freq: 3 TIMES DAILY BEFORE MEALS Route: SC  Start: 08/26/17 1200   Admin Instructions: Correction Scale - LOW INSULIN RESISTANCE DOSING     Do Not give Correction Insulin if Pre-Meal BG less than 140.   For Pre-Meal  - 239 give 1 unit.   For Pre-Meal  - 339 give 2 units.   For Pre-Meal BG greater than or equal to 340 give 3 units.   To be given with prandial insulin, and based on pre-meal blood glucose.   Notify provider if glucose greater than or equal to 350 mg/dL after administration of correction dose.  If given at mealtime, must be administered 5 min before meal or immediately after.     (1328)-Not Given       (1748)-Not Given        (0900)-Not Given       (1242)-Not Given       1603 (1 Units)-Given        (0822)-Not Given [C]       1221 (1 Units)-Given [C]       (1657)-Not Given        (0913)-Not Given       1152 (1 Units)-Given [C]       1758 (1 Units)-Given        (0752)-Not Given       1249 (1 Units)-Given       1811 (1 Units)-Given        (0816)-Not Given       (1156)-Not Given       (1823)-Not Given        (0933)-Not Given       (1256)-Not Given       [ ] 1700           ipratropium - albuterol 0.5 mg/2.5 mg/3 mL (DUONEB) neb solution 3 mL  Dose: 3 mL Freq: EVERY 4 HOURS PRN Route:  NEBULIZATION  PRN Reason: wheezing  Start: 08/26/17 1608     0413 (3 mL)-Given           0334 (3 mL)-Given            lactobacillus rhamnosus (GG) (CULTURELL) capsule 1 capsule  Dose: 1 capsule Freq: DAILY Route: PO  Start: 08/26/17 1115   Admin Instructions: Administer at least 2 hours before or after oral antibiotics. Capsules may be opened.     1204 (1 capsule)-Given        0901 (1 capsule)-Given        0823 (1 capsule)-Given        1012 (1 capsule)-Given        0811 (1 capsule)-Given        0826 (1 capsule)-Given        1256 (1 capsule)-Given           melatonin tablet 1 mg  Dose: 1 mg Freq: AT BEDTIME PRN Route: PO  PRN Reason: sleep  Start: 08/30/17 2049        2104 (1 mg)-Given             metoprolol (LOPRESSOR) tablet 25 mg  Dose: 25 mg Freq: 2 TIMES DAILY Route: PO  Start: 08/28/17 2000   Admin Instructions: Hold if SBP<100 or HR <60       1848 (25 mg)-Given [C]               1012 (25 mg)-Given       1953 (25 mg)-Given        0811 (25 mg)-Given       2103 (25 mg)-Given        0826 (25 mg)-Given       1954 (25 mg)-Given        0933 (25 mg)-Given       [ ] 2000           naloxone (NARCAN) injection 0.1-0.4 mg  Dose: 0.1-0.4 mg Freq: EVERY 2 MIN PRN Route: IV  PRN Reason: opioid reversal  Start: 08/26/17 0956   Admin Instructions: For respiratory rate LESS than or EQUAL to 8.  Partial reversal dose:  0.1 mg titrated q 2 minutes for Analgesia Side Effects Monitoring Sedation Level of 3 (frequently drowsy, arousable, drifts to sleep during conversation).Full reversal dose:  0.4 mg bolus for Analgesia Side Effects Monitoring Sedation Level of 4 (somnolent, minimal or no response to stimulation).               ondansetron (ZOFRAN-ODT) ODT tab 4 mg  Dose: 4 mg Freq: EVERY 6 HOURS PRN Route: PO  PRN Reasons: nausea,vomiting  Start: 08/26/17 0958   Admin Instructions: This is Step 1 of nausea and vomiting management.  If nausea not resolved in 15 minutes, go to Step 2 prochlorperazine (COMPAZINE). Do not push through  foil backing. Peel back foil and gently remove. Place on tongue immediately. Administration with liquid unnecessary              Or  ondansetron (ZOFRAN) injection 4 mg  Dose: 4 mg Freq: EVERY 6 HOURS PRN Route: IV  PRN Reasons: nausea,vomiting  Start: 08/26/17 0958   Admin Instructions: This is Step 1 of nausea and vomiting management.  If nausea not resolved in 15 minutes, go to Step 2 prochlorperazine (COMPAZINE).  Irritant.               pentoxifylline (TRENtal) CR tablet 400 mg  Dose: 400 mg Freq: DAILY Route: PO  Start: 08/26/17 1100   Admin Instructions: DO NOT CRUSH.     1204 (400 mg)-Given        0901 (400 mg)-Given        0822 (400 mg)-Given        1017 (400 mg)-Given        0811 (400 mg)-Given        0826 (400 mg)-Given        0934 (400 mg)-Given           pramipexole (MIRAPEX) tablet 1 mg  Dose: 1 mg Freq: AT BEDTIME Route: PO  Start: 08/30/17 2200        2104 (1 mg)-Given        2128 (1 mg)-Given        [ ] 2200           pravastatin (PRAVACHOL) tablet 40 mg  Dose: 40 mg Freq: DAILY Route: PO  Start: 08/26/17 1100    1204 (40 mg)-Given        0901 (40 mg)-Given        0821 (40 mg)-Given        1017 (40 mg)-Given        0811 (40 mg)-Given        0826 (40 mg)-Given        0933 (40 mg)-Given           prochlorperazine (COMPAZINE) injection 5 mg  Dose: 5 mg Freq: EVERY 6 HOURS PRN Route: IV  PRN Reasons: nausea,vomiting  Start: 08/26/17 0958   Admin Instructions: This is Step 2 of nausea and vomiting management.   If nausea not resolved in 15 minutes, give metoclopramide (REGLAN) if ordered (step 3 of nausea and vomiting management)              Or  prochlorperazine (COMPAZINE) tablet 5 mg  Dose: 5 mg Freq: EVERY 6 HOURS PRN Route: PO  PRN Reason: vomiting  Start: 08/26/17 0958   Admin Instructions: This is Step 2 of nausea and vomiting management.   If nausea not resolved in 15 minutes, give metoclopramide (REGLAN) if ordered (step 3 of nausea and vomiting management)              Or  prochlorperazine  (COMPAZINE) Suppository 12.5 mg  Dose: 12.5 mg Freq: EVERY 12 HOURS PRN Route: RE  PRN Reasons: nausea,vomiting  Start: 08/26/17 0958   Admin Instructions: This is Step 2 of nausea and vomiting management.   If nausea not resolved in 15 minutes, give metoclopramide (REGLAN) if ordered (step 3 of nausea and vomiting management)               senna-docusate (SENOKOT-S;PERICOLACE) 8.6-50 MG per tablet 1-2 tablet  Dose: 1-2 tablet Freq: 2 TIMES DAILY PRN Route: PO  PRN Comment: constipation   Start: 08/26/17 0958   Admin Instructions: If no bowel movement in 24 hours, increase to 2 tablets PO BID.  Hold for loose stools.   This is the first step of a three step constipation treatment protocol.               sodium bicarbonate tablet 1,300 mg  Dose: 1,300 mg Freq: 3 TIMES DAILY Route: PO  Start: 08/26/17 1100    1204 (1,300 mg)-Given       1748 (1,300 mg)-Given       2324 (1,300 mg)-Given        0901 (1,300 mg)-Given       1334 (1,300 mg)-Given       1933 (1,300 mg)-Given        0821 (1,300 mg)-Given       1354 (1,300 mg)-Given       2120 (1,300 mg)-Given        1011 (1,300 mg)-Given       1522 (1,300 mg)-Given       1956 (1,300 mg)-Given        0811 (1,300 mg)-Given       1438 (1,300 mg)-Given       2104 (1,300 mg)-Given        0827 (1,300 mg)-Given       1459 (1,300 mg)-Given       1954 (1,300 mg)-Given        0933 (1,300 mg)-Given       [ ] 1400       [ ] 2000           torsemide (DEMADEX) tablet 20 mg  Dose: 20 mg Freq: 2 TIMES DAILY Route: PO  Start: 08/31/17 2000         1954 (20 mg)-Given        0933 (20 mg)-Given       [ ] 2000          Completed Medications  Medications 08/26/17 08/27/17 08/28/17 08/29/17 08/30/17 08/31/17 09/01/17         Dose: 3 mL Freq: ONCE Route: NEBULIZATION  Start: 08/31/17 1115   End: 08/31/17 1347         1347 (3 mL)-Given           Discontinued Medications  Medications 08/26/17 08/27/17 08/28/17 08/29/17 08/30/17 08/31/17 09/01/17         Dose: 2 mg Freq: 2 TIMES DAILY Route:  IV  Start: 08/28/17 1530   End: 08/31/17 0851      1652 (2 mg)-Given        1011 (2 mg)-Given       1522 (2 mg)-Given        0810 (2 mg)-Given       1438 (2 mg)-Given        0824 (2 mg)-Given       0851-Med Discontinued          Dose: 1 tablet Freq: DAILY Route: PO  Start: 08/26/17 1000   End: 08/31/17 1144    1205 (1 tablet)-Given        0901 (1 tablet)-Given        0822 (1 tablet)-Given        1017 (1 tablet)-Given        0810 (1 tablet)-Given        0826 (1 tablet)-Given       1144-Med Discontinued          Dose: 2,000 Units Freq: DAILY Route: PO  Start: 08/26/17 1000   End: 08/31/17 1229    1205 (2,000 Units)-Given        0901 (2,000 Units)-Given        0822 (2,000 Units)-Given        1013 (2,000 Units)-Given        0810 (2,000 Units)-Given        0826 (2,000 Units)-Given       1229-Med Discontinued          Dose: 3 mL Freq: EVERY 4 HOURS PRN Route: NEBULIZATION  PRN Reason: wheezing  Start: 08/30/17 1330   End: 08/30/17 1340        1340-Med Discontinued           Dose: 3 mL Freq: 4 TIMES DAILY Route: NEBULIZATION  Start: 08/26/17 1615   End: 08/30/17 1327    1647 (3 mL)-Given       2044 (3 mL)-Given        0803 (3 mL)-Given       1205 (3 mL)-Given       1512 (3 mL)-Given       2045 (3 mL)-Given        (0912)-Not Given       1101 (3 mL)-Given       1543 (3 mL)-Given       2017 (3 mL)-Given        0813 (3 mL)-Given       1439 (3 mL)-Given              (2129)-Not Given        0835 (3 mL)-Given       1209 (3 mL)-Given       1327-Med Discontinued           Dose: 0.75 mg Freq: DAILY Route: PO  Start: 08/30/17 1115   End: 08/30/17 1534   Admin Instructions: Do not crush tablet.                1534-Med Discontinued      Medications 08/26/17 08/27/17 08/28/17 08/29/17 08/30/17 08/31/17 09/01/17               INTERAGENCY TRANSFER FORM - NOTES (H&P, Discharge Summary, Consults, Procedures, Therapies)   8/26/2017                       UNIT [facility] [facility]: [phone]               History & Physicals      H&P by  Cameron Burrell MD at 8/26/2017 10:41 AM     Author:  Cameron Burrell MD Service:  General Medicine Author Type:  Resident    Filed:  8/26/2017 12:37 PM Date of Service:  8/26/2017 10:41 AM Creation Time:  8/26/2017 10:41 AM    Status:  Attested :  Cameron Burrell MD (Resident)    Cosigner:  Gold Tobin MD at 8/26/2017  1:39 PM        Attestation signed by Gold Tobin MD at 8/26/2017  1:39 PM        Attestation:  Physician Attestation   I, Gold Tobin, saw this patient with the resident and agree with the resident s findings and plan of care as documented in the resident s note.      I personally reviewed vital signs, medications, labs and imaging.    Key findings: 77 year old woman with multiple recent admissions admitted from TCU with worsening shortness of breath and acute kidney injury on chronic kidney disease. Not recently on diuretics as an outpatient at TCU. Started to feel SOB a few days ago. CXR with significant pulmonary edema per my read. Bedside ultrasound with b-lines and no sign of consolidation. IVC with no respiratory variation. WBC elevated but no fevers, sputum production, negative procalctionin which makes pneumonia unlikely. Placed on BIPAP and oxygen saturations improved. Given 40 mg IV furosemide x2 in ED with good urine output response. Will stop antibiotics given low likelihood of infection. Will transition from furosemide to bumetanide given low albumin, monitor strict I&Os, and wean oxygen as able.     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 Per patient, she was not discharged on lasix and has been doing fine[JW1.1] until 1-2 days ago when she started to feel[JW1.2] more SOB and coughing more. Cough out dry sputum without blood. Also noticed not urinating as much as prior. Her SOB is on every activities and could not lie down flat. She also endorsed swelling of her UE and LE. Upon asking about dysuria, she denied having burning urination and also stated that she did not has these symptoms in prior infection too. However, in prior notes, it was stated that she did have dysuria.     Denies chest pain. Breathing much more comfortably when on BiPAP mask asking to be out from the mask and talk to her  on cell phone. Denies fever/chills at TCU.[JW1.3]     ED course :[JW1.1] Lasix 40 mg iv x 2, UOP 1L by the time at the floor[JW1.3]    Recent admission(s)   8/12-18: MARTÍN on CKD, sepsis from UTI and cellulitis  7/6-11: MARTÍN on CKD, HFpEF  6/21-25: MARTÍN on CKD  6/12-16: MARTÍN, Aflutter  5/31-6/6: Anarsarca, MARTÍN  4/28-5/2: MARTÍN and UTI    ROS (+)[JW1.1] Cough, SOB[JW1.3]  ROS (-) HA, fever, chills, night sweats, lumps, bumps, rashes   Sore throat, CP   Abdominal pain, Nausea, vomitting, diarrhea, constipation   Remainder of 12pt-ROS negative except mentioned above         Past Medical History[JW1.1]     Patient Active Problem List   Diagnosis     Restless leg syndrome     Sleep apnea     Lipoma of other skin and subcutaneous tissue     ESOPHAGEAL REFLUX     Postsurgical aortocoronary bypass status     Iron deficiency anemia     History of peptic ulcer disease     Edema     Kidney stone     Hyperlipidemia LDL goal <100     Advanced directives, counseling/discussion     CAD - NSTEMI, CABG x 5 2007, angio  in 2013 with patent grafts other than occluded graft to PL     Hx of non-ST elevation myocardial infarction (NSTEMI)     Health Care Home     Lymphedema     Anemia of chronic renal failure, stage 4 (severe) (H)     Renal failure     Osteoarthritis of hip     Non morbid obesity, unspecified obesity type     Type 2 diabetes mellitus with other circulatory complications (H)     Long-term (current) use of anticoagulants [Z79.01]     Acute renal failure with tubular necrosis (H)     Essential hypertension with goal blood pressure less than 140/90     Atrial fibrillation with rapid ventricular response (H)     Rash - redness medial thighs     Metabolic acidosis, normal anion gap (NAG)     Pulmonary hypertension (H)     Chronic heart failure (H) with preserved EF     Atrial flutter (H) - present 6/12     Generalized edema - anasarca     Hypertensive heart and chronic kidney disease with heart failure and stage 1 through stage 4 chronic kidney disease, or unspecified chronic kidney disease (H)     Supratherapeutic INR     Proteinuria 2.1 g/g Cr     Bradycardia     Acute on chronic renal failure (H)     Memory loss     Chronic atrial fibrillation (H) on 6/12-6/13     Blood in stool     Microscopic hematuria     Acute kidney injury (nontraumatic) (H)     CHF (congestive heart failure) (H)     Anemia     Anemia, iron deficiency     Acidosis     Pulmonary edema[JW1.4]           Past Surgical History[JW1.1]     Past Surgical History:   Procedure Laterality Date     ANGIOPLASTY       C APPENDECTOMY       C CABG, VEIN, FIVE  12/06    SVG x 4 and LIMA to LAD     C SOTO W/O FACETEC FORAMOT/DSKC 1/2 VRT SEG, LUMBAR  1968     C REMV CATARACT INTRACAP,INSERT LENS      Bilateral     C TOTAL ABDOM HYSTERECTOMY  1995     - fibroids     C VAGOTOMY/PYLOROPLASTY,SELECT  1970     COLONOSCOPY  12/3/2007     COLONOSCOPY  1/17/2014    Procedure: COLONOSCOPY;  Colonoscopy;  Surgeon: Zack Stearns MD;  Location:  GI     CYSTOSCOPY  11/25/09     [facility]     ENDOSCOPY  03/21/2000    [facility]     HC COLONOSCOPY THRU STOMA, DIAGNOSTIC  10/7/04    poor prep, repeat in 2-3 years     HC COLONOSCOPY W/WO BRUSH/WASH  10/31/07    Repeat-poor quality prep     HC REMOVAL OF OVARY/TUBE(S)      Salpingo-Oophorectomy, Unilateral     HC REVISE MEDIAN N/CARPAL TUNNEL SURG      Carpal tunnel release     HC UGI ENDOSCOPY DIAG W BIOPSY  10/31/07     HC UGI ENDOSCOPY, SIMPLE EXAM  12/3/2007     OPEN REDUCTION INTERNAL FIXATION HIP NAILING Left 7/3/2016    Procedure: OPEN REDUCTION INTERNAL FIXATION HIP NAILING;  Surgeon: Lake Schulz MD;  Location:  OR[JW1.4]            Medications[JW1.1]     No current facility-administered medications on file prior to encounter.   Current Outpatient Prescriptions on File Prior to Encounter:  ACETAMINOPHEN PO Take 650 mg by mouth every 4 hours as needed for pain For pain 1-5 out of 10   TRAMADOL HCL PO Take 50 mg by mouth every 6 hours as needed for moderate to severe pain Pain of 6-10 out of 10   cyanocobalamin (VITAMIN B12) 1000 MCG/ML injection Inject 1 mL into the muscle every 30 days   aspirin 81 MG tablet Take 1 tablet (81 mg) by mouth daily   sodium bicarbonate 650 MG tablet Take 2 tablets (1,300 mg) by mouth 3 times daily   nitroGLYcerin (NITROSTAT) 0.4 MG sublingual tablet Place 1 tablet (0.4 mg) under the tongue every 5 minutes as needed for chest pain   gabapentin (NEURONTIN) 300 MG capsule Take 1 capsule (300 mg) by mouth At Bedtime   pravastatin (PRAVACHOL) 40 MG tablet Take 1 tablet (40 mg) by mouth daily   metoprolol (LOPRESSOR) 50 MG tablet Take 0.5 tablets (25 mg) by mouth 2 times daily   amLODIPine (NORVASC) 5 MG tablet Take 2 tablets (10 mg) by mouth daily   ferrous sulfate (IRON) 325 (65 FE) MG tablet Take 1 tablet (325 mg) by mouth daily (with breakfast)   calcium acetate (PHOSLO) 667 MG CAPS capsule Take 1 capsule (667 mg) by mouth 3 times daily (with meals)   pentoxifylline (TRENTAL) 400 MG CR tablet Take 1  tablet (400 mg) by mouth daily   cholecalciferol 2000 UNITS tablet Take 2,000 Units by mouth daily   calcium carbonate-vitamin D 500-400 MG-UNIT TABS per tablet Take 1 tablet by mouth daily   Lactobacillus (ACIDOPHILUS) tablet Take 1 tablet by mouth daily   ORDER FOR DME Equipment being ordered: cpap machine, mask, humidifier, tubing and filters[JW1.4]            Allergies[JW1.1]     Allergies   Allergen Reactions     Ciprofloxacin Nausea and Vomiting     Zofran did not help     Oxycodone Visual Disturbance     Delusions, blackouts      Lisinopril Cough     Sulfa Drugs GI Disturbance     LOSS OF TASTE[JW1.4]            Social History[JW1.1]     Social History     Social History     Marital status:      Spouse name: Urbano Banks     Number of children: 2     Years of education: 13     Occupational History     Ministry coordinator      [employer]     Social History Main Topics     Smoking status: Former Smoker     Years: 10.00     Quit date: 1969     Smokeless tobacco: Never Used     Alcohol use 0.0 oz/week     0 Standard drinks or equivalent per week      Comment: occasional- 2 drinks per month     Drug use: No     Sexual activity: Yes     Birth control/ protection: Surgical     Other Topics Concern     Parent/Sibling W/ Cabg, Mi Or Angioplasty Before 65f 55m? No     Social History Narrative[JW1.4]            Family History[JW1.1]     Family History   Problem Relation Age of Onset     DIABETES Father      AODM     Neurologic Disorder Father      Parkinson's     Blood Disease Father       from blood clot from leg to lung immediately after hip fracture     DIABETES Paternal Grandmother      adult onset     DIABETES Maternal Grandmother      adult onset     Hypertension No family hx of      CEREBROVASCULAR DISEASE No family hx of[JW1.4]           Vitals and Exam     Physical exam:[JW1.1]  /76 (BP Location: Right arm)  Pulse 78  Temp 97.4  F (36.3  C) (Axillary)  Resp  20  SpO2 96%  Wt Readings from Last 2 Encounters:   08/23/17 83.3 kg (183 lb 9.6 oz)   08/22/17 85.3 kg (188 lb 1.6 oz)[JW1.4]     General: AAOx3, no clubbing/cyanosis,[JW1.1] 1+[JW1.3] edema[JW1.1] at legs, more edema on arms[JW1.3],   HEENT:  PERRL, EOMI, MMM with out pharyngeal erythema.   Cardiac: RRR. No m/r/g. Normal S1, S2. DP2+ bilaterally  Pulm:[JW1.1] occasional[JW1.3] wheezes,[JW1.1]  Bilateral inspiratory[JW1.3] crackles.  Abd: +BS, soft, non distended, non tender. No hepatosplenomegaly.  Skin: No rash  MSK: No deformities, no joint swelling  Neuro: CN grossly intact, no focal deficits  Psych:[JW1.1] normal[JW1.3] mood, full affect    In dwelling lines at admission:[JW1.1] Cee's upon admission[JW1.3]         Labs   CBC[JW1.1]  Recent Labs  Lab 08/26/17  0637 08/22/17  1410   WBC 12.9* 9.3   RBC 2.94* 3.10*   HGB 7.4* 7.8*   HCT 24.8* 25.8*   MCV 84 83   MCH 25.2* 25.2*   MCHC 29.8* 30.2*   RDW 22.6* 22.3*    167[JW1.4]       BMP[JW1.1]  Recent Labs  Lab 08/26/17  0637 08/22/17  1410   * 147*   POTASSIUM 4.8 4.6   CHLORIDE 118* 118*   CO2 22 20   ANIONGAP 8 9   * 257*   BUN 35* 30   CR 4.42* 4.48*   GFRESTIMATED 10* 10*   GFRESTBLACK 12* 12*   MALICK 6.1* 6.3*   PHOS  --  4.8*[JW1.4]        INR[JW1.1]  Recent Labs  Lab 08/26/17  0637   INR 1.11[JW1.4]       Liver panel[JW1.1]  Recent Labs  Lab 08/26/17  0637 08/22/17  1410   PROTTOTAL 5.2*  --    ALBUMIN 1.8* 2.0*   BILITOTAL 0.3  --    ALKPHOS 292*  --    AST 34  --    ALT 80*  --[JW1.4]        Urinalysis  Recent Labs   Lab Test  08/22/17   1418   07/02/16   0224   COLOR  Light Yellow   < >  Yellow   APPEARANCE  Slightly Cloudy   < >  Clear   URINEGLC  150*   < >  Negative   URINEBILI  Negative   < >  Negative   URINEKETONE  Negative   < >  Negative   SG  1.007   < >  1.020   UBLD  Trace*   < >  Trace*   URINEPH  5.0   < >  6.0   PROTEIN  10*   < >  Negative   UROBILINOGEN   --    --   0.2   NITRITE  Negative   < >  Negative   LEUKEST   Large*   < >  Negative   RBCU  5-10*   < >  2-5*   WBCU  25-50*   < >  O - 2    < > = values in this interval not displayed.       Cultures  Last 6 Culture results with specimen source[JW1.1]  Culture Micro   Date Value Ref Range Status   08/13/2017 (A)  Final    >100,000 colonies/mL  Escherichia coli  Susceptibility testing done on previous specimen     08/13/2017 (A)  Final    50,000 to 100,000 colonies/mL  Enterococcus faecalis     08/13/2017   Final    Plus  10,000 to 50,000 colonies/mL  mixed urogenital tyler  Susceptibility testing not routinely done     08/12/2017 No growth  Final   08/12/2017 No growth  Final   08/12/2017 Moderate growth  Pseudomonas aeruginosa   (A)  Final   08/12/2017 Moderate growth  Serratia marcescens   (A)  Final   08/12/2017 Moderate growth  Enterococcus faecalis   (A)  Final   08/12/2017 (A)  Final    Heavy growth  Coagulase negative Staphylococcus  Susceptibility testing not routinely done     08/12/2017 Light growth  Klebsiella oxytoca   (A)  Final   08/12/2017 Plus  Light growth  Normal skin tyler    Final   08/12/2017 (A)  Final    >100,000 colonies/mL Escherichia coli  50,000 to 100,000 colonies/mL Citrobacter koseri  Plus <10,000 colonies/mL mixed urogenital tyler Susceptibility testing not   routinely done      Specimen Description   Date Value Ref Range Status   08/15/2017 Feces  Final   08/13/2017 Midstream Urine  Final   08/12/2017 Blood Unspecified Site  Final   08/12/2017 Blood Unspecified Site  Final   08/12/2017 Leg Wound  Final   08/12/2017 Leg Wound  Final   08/12/2017 Midstream Urine  Final[JW1.4]        Last check of C difficile[JW1.1]  C Diff Toxin B PCR   Date Value Ref Range Status   08/15/2017 Negative NEG^Negative Final     Comment:     Negative: Clostridium difficile target DNA sequences NOT detected, presumed   negative for Clostridium difficile toxin B or the number of bacteria present   may be below the limit of detection for the test.  FDA approved assay  performed using [employer] GeneXpert real-time PCR.  A negative result does not exclude actual disease due to Clostridium difficile   and may be due to improper collection, handling and storage of the specimen   or the number of organisms in the specimen is below the detection limit of the   assay.[JW1.4]         Imaging/procedure results:[JW1.1]  Recent Results (from the past 48 hour(s))   XR Chest Port 1 View    Narrative    XR CHEST PORT 1 VW 8/26/2017 7:06 AM    History: Chest pain    Comparison: 8/12/2017    Findings: Single portable AP view of the chest. The heart size is  within normal limits. Median sternotomy wires are stable. The trachea  is slightly shifted to the right. Bilateral perihilar and basilar  airspace opacities, new since prior study. Bilateral left greater than  right pleural effusions and associated atelectasis. New left  retrocardiac opacity. No pneumothorax.      Impression    Impression:   1. New bilateral perihilar and basilar opacities representing  pulmonary edema or infection.  2. Bilateral pleural effusions with associated atelectasis or  consolidations.    I have personally reviewed the examination and initial interpretation  and I agree with the findings.    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 Multiple provocation from infection (cellulitis/ PNA/UTI) and was in  the ICU before for respiratory distress. Cr on this admission was 4.4 similar to last discharge Cr, however unclear if this could be MARTÍN from a recovered Cr. Of note, patient was not discharged with diuretics. Less suspicous of PNA[JW1.3] given no fever, no productive sputum, negative procal and diffuse B-line on bedside U/S  - Stop all antibiotics, continue to monitor for fever[JW1.5]  - s/p lasix 40 mg x 2, continue with bumex 2 mg IV tid, with good UOP  - FeUrea sent to teased out[JW1.3] A[JW1.5]TN vs cardiorenal  - BiPAP and wean as able to high flow. Could eat if off from BiPAP  - Daily I/O, weight, BMP, avoid nephrotoxic agents[JW1.3]  - Cee's in because of urinary retention after getting diuresis    # CKD-BMD, secondary hyperPTH, VitaminD deficient  - continue phoslo, vitaminD, calcium carbonate  - last ferritin 242, continue ferrous supplement    # HTN  Needing diuresis; thus will decrease dosing  - hold amlodipine  - metoprolol 25 -> 12.5 mg bid[JW1.5]    #[JW1.1] DM2  Diet control, likely because of deteriorating renal function. A1c 6/22/2017 = 7.3  - Low SSI, hypoglycemia protocol  - POCT q4 while NPO[JW1.5]    #[JW1.1] Chronic venous ulcer[JW1.3]   W[JW1.5]/u in last admission, not appear to be infected[JW1.3]  -[JW1.5] WOC nurse consult[JW1.3]  - Vascular surgery follow-up for outpatient TCO2 study, Dermatology f/u in 4-6 weeks  - Continue pentoxifylline[JW1.5]      Chronic problems[JW1.1]  # Vertical Diplopia: no change currently[JW1.5]  #[JW1.1] Afib s/p ablation 6/2017[JW1.5] :[JW1.1] no recurrent, cardiology determined to be off from warfarin[JW1.5]  #[JW1.1] CAD s/p CABG[JW1.5] :[JW1.1] continue ASA, pracastatin[JW1.5]  #[JW1.1] Chronic diarrhea[JW1.5] :[JW1.1] last C.diff check 8/15, will CTM[JW1.5]  #[JW1.1] Gluteal cyst[JW1.5] :[JW1.1] stable, no I&D per last surgery[JW1.5]    FEN:[JW1.1] NPO until persistently off from BiPAP[JW1.5]  DVT Prophylaxis:[JW1.1]  SCD[JW1.5]  GI Prophylaxis:[JW1.1]  not indicated[JW1.5]  Disposition:[JW1.1] PT/OT, likely same TCU[JW1.5]  Code Status:[JW1.1] FULL[JW1.5]    Seen and discussed with [JW1.1] Mahad[JW1.5] who agrees with above assessment and plan.  Cameron Burrell MD PGY1 Internal Medicine  [JW1.1]       Revision History        User Key Date/Time User Provider Type Action    > JW1.5 8/26/2017 12:37 PM Cameron Burrell MD Resident Sign     JW1.3 8/26/2017 11:51 AM Cameron Burrell MD Resident Share     JW1.2 8/26/2017 11:20 AM Cameron Burrell MD Resident Share     JW1.4 8/26/2017 10:52 AM Cameron Burrell MD Resident Share     JW1.1 8/26/2017 10:41 AM Cameron Burrell MD Resident                   Discharge Summaries     No notes of this type exist for this encounter.         Consult Notes      Consults by Melinda Farooq RN at 8/28/2017  2:25 PM     Author:  Melinda Farooq RN Service:  Ancillary, Case Management Author Type:  Registered Nurse    Filed:  8/28/2017  2:30 PM Date of Service:  8/28/2017  2:25 PM Creation Time:  8/28/2017  2:25 PM    Status:  Signed :  Melinda Farooq RN (Registered Nurse)     Consult Orders:    1. CORE Clinic Evaluation IP Consult: Patient to be seen: Routine - within 24 hours; second episode of cardiorenal syndrome in 1 month, will need close outpatient fluid monitoring; Consultant may enter orders: Yes [[id number]] ordered by Gilda Mejia MD at 08/27/17 1632                77 year old female presenting with cough and sob. CORE consult requested. Kathryn has a history of HFpEF.      Kathryn was provided with a CHF book.  I reviewed the importance of daily weights, 2 gm sodium diet, 2L fluid restriction and compliance with medications upon hospital discharge.  CORE contact phone numbers provided and patient is encouraged to call with any questions or concerns, including any weight gain or loss of 2 or more pounds in 24 hours or 5 or more pounds in 1 week.      Appointment  "made in CORE clinic TBD.   I will follow with patient and when we get closer to a discharge date, we will schedule a follow up appointment.     Melinda Farooq RN BSN   Cardiology Care Coordinator - [facility]  Questions and [phone]  First press #1 for the [employer] and then press #3 for \"Medical Advise\" to reach us Cardiology Nurses.[KH1.1]         Revision History        User Key Date/Time User Provider Type Action    > KH1.1 2017  2:30 PM Melinda Farooq RN Registered Nurse Sign            Consults by Mike Medina MD at 2017  4:24 PM     Author:  Mike Medina MD Service:  Nephrology Author Type:  Resident    Filed:  2017  6:07 PM Date of Service:  2017  4:24 PM Creation Time:  2017  4:24 PM    Status:  Attested :  Mike Medina MD (Resident)    Cosigner:  Dheeraj Matthew MD at 2017  8:04 PM         Consult Orders:    1. Nephrology General Adult IP Consult: per patient request to notify nephrology team, talked to ; Consultant may enter orders: Yes [[id number]] ordered by Cameron Burrell MD at 17 1141           Attestation signed by Dheeraj Matthew MD at 2017  8:04 PM        Attending note: I have examined this patient and the above note reflects my review of the history, exam findings, and assessment and plan.    Dheeraj Matthew MD  [phone]                                  Nephrology Initial Consult  2017      Kathryn Banks MRN:[id number] YOB: 1940  Date of Admission:2017  Primary care provider: Dheeraj Jj  Requesting physician: Rajan Epperson MD    ASSESSMENT AND RECOMMENDATIONS:   Kathryn Banks is a 77 year old[DM1.1] female with h/o HFpEF, CAD s/p CABG, progressive CKD with several MARTÍN events who was recently discharged after admission with volume overload and MARTÍN on CKD with improving creatinine who is re-admitted " from TCU with hypoxia and anasarca.[DM1.2]     # MARTÍN on CKD, progressive renal failure  Patient with stable creatinine/eGFR from recent f/u in clinic. CKD thought due to MARTÍN events, DM, HTN, and renovascular disease. Pt has been following with Dr Barfield for about 3 months it appears per chart review and should continue to do so as they have started to talk about dialysis options. No acute dialysis indications at this time. Pt explaining that she is strongly considering dialysis when needed and is planning to learn more through Renal clinic.   -recommend obtaining venous mapping for fistula planning while admitted if able  -have messaged Dr Barfield about pt's admission  -recommend arranging close PCP, nephrology, and CORE clinic (see below) f/u upon discharge       # anasarca  # hypervolemia  # acute hypoxic respiratory failure  Pt with HFpEF hx in addition to MARTÍN on CKD and there has been c/f CRS as component of her renal failure multiple times. Thus would be reasonable to enroll on CORE clinic for close diuretic f/u as this pt is clearly a diuretic challenge with these several admissions. Agree with somewhat aggressive diuresis in light of hypoxia but would be wary of hypotension to prevent further renal insults  -recommend CORE clinic referral   -consider cardiology consult if needed   -salt restrict diet   -reasonable to continue Bumex 2mg IV TID at this time, pt very likely to require PO diuretics prescribed at discharge for outpt f/u and adjustment       # electrolytes  Potassium WNL  Hypernatremia - Pt with mild hyperNa to 146-147. In light of this would actually allow unrestricted free water intake to allow to correct as will achieve dietary effects on edema but salt restricting (as above, anasarca/hypervolemia).   -rec allowing unrestricted fluid intake so long as salt restricted diet         # acid-base  Bicarb good at 23, continue home bicarb tabs.     # anemia  Pt with worsened anemia to 6s and primary  team working up. BAYLEE on iron studies and pt on ferrous sulfate at home. Pt will require her consideration of GI workup as well given most-menopausal and iron deficient if not already done.   -rec Venofer 200mg IV daily x5 (we have ordered for you), though pt need not complete if dispo pending before 5 day course completed   -rec continue her home oral iron afterward (ferrous sulf 325mg daily)  -would pursue EPO in outpt setting through nephrology f/u   -chronic Hgb goal 10, acute goal/transfusion goal per primary team     # MBD  Hypocalcemia with corrected calcium 7.9. Patient on cholecalciferol 2000 units at home but given degree of CKD may not be able to convert this active form. Thus would recommend switch to calcitriol and continuing that change upon discharge.   -rec calcitriol for vit D supplementation  -rec continue home Phoslo   -rec check ionized calcium, replete if needed, continue home oral calcium[DM1.3]       Renal will sign off. Please call with questions.[DM1.2]     Recommendations were communicated to primary team via[DM1.1] note and phone call to cross cover.[DM1.2]     Seen and discussed with [DM1.1] Vipul.[DM1.2]     Mike Medina MD[DM1.1]   Renal Fellow[DM1.2]  520-[DM1.1]7140[DM1.2]      REASON FOR CONSULT:[DM1.1] re-admission with anasarca[DM1.2]    HISTORY OF PRESENT ILLNESS:  Kathryn Banks is a 77 year old[DM1.1] female with h/o HFpEF, CAD s/p CABG, progressive CKD with several MARTÍN events who was recently discharged after admission with volume overload and MARTÍN on CKD with improving creatinine who is re-admitted from TCU with hypoxia and anasarca.     The patient has had several admissions for volume overload recently with c/f her worsening renal function and her HF as main drivers. CRS has been suspected as the cause of her MARTÍN events, though with last admission in early August her cellulitis and ulcers of her LEs also raised c/f ATN from sepsis. She was discharged with  creatinine, which has been progressively worsening from 1.3-1.5 at the beginning of 2017 to as high as 5.5 last admission through several MARTÍN/progressive CKD events, down to 5.1 and was 4.5 when seen 8/22 in Nephrology clinic with Dr Barfield. The pt it appears had been over aggressively diuresed[DM1.2] as Lasix was stopped inpt, though it appears from discharge day nephrology progress note diuretic resumption was recommended but Lasix was discontinued from home meds per discharge summary. Pt seen in Renal clinic just a few days later where due to reported diarrhea, ongoing LE infection with c/f ATN as cause of most recent MARTÍN, and hypernatremia pt was encouraged to PO more free water and diuretics were not restarted. Pt states she didn't really change in PO intake but did become SOB at TCU. She became hypoxic and was admitted from TCU on BIPAP.     Pt denies loss of appetite, N/V, CP or abd pain. LEs much improved with regard to redness pt states. No fevers.[DM1.3]     PAST MEDICAL HISTORY:  Reviewed with patient on 08/27/2017     Past Medical History:   Diagnosis Date     Carpal tunnel syndrome      Coronary atherosclerosis of native coronary artery 2006    5 vessel CABG     OBSTRUCTIVE SLEEP APNEA     using CPAP     Osteoarthrosis, unspecified whether generalized or localized, unspecified site     generalized arthritis, particularly in her hands and feet         Other and combined forms of senile cataract 2000    Bilateral     Other and unspecified hyperlipidemia      RESTLESS LEGS SYNDROME      TENOSYNOV HAND/WRIST NEC 6/30/2006     Type II or unspecified type diabetes mellitus without mention of complication, not stated as uncontrolled 2001    Diabetes mellitus/pt is diet controlled with weight loss     Typical atrial flutter (H) 01/17/2007    ablation 6/7/2017     Unspecified essential hypertension        Past Surgical History:   Procedure Laterality Date     ANGIOPLASTY       C APPENDECTOMY       C CABG, VEIN,  FIVE  12/06    SVG x 4 and LIMA to LAD     C SOTO W/O FACETEC FORAMOT/DSKC 1/2 VRT SEG, LUMBAR  1968     C REMV CATARACT INTRACAP,INSERT LENS      Bilateral     C TOTAL ABDOM HYSTERECTOMY  1995     - fibroids     C VAGOTOMY/PYLOROPLASTY,SELECT  1970     COLONOSCOPY  12/3/2007     COLONOSCOPY  1/17/2014    Procedure: COLONOSCOPY;  Colonoscopy;  Surgeon: Zack Stearns MD;  Location:  GI     CYSTOSCOPY  11/25/09    [facility]     ENDOSCOPY  03/21/2000    Upper GI     HC COLONOSCOPY THRU STOMA, DIAGNOSTIC  10/7/04    poor prep, repeat in 2-3 years     HC COLONOSCOPY W/WO BRUSH/WASH  10/31/07    Repeat-poor quality prep     HC REMOVAL OF OVARY/TUBE(S)      Salpingo-Oophorectomy, Unilateral     HC REVISE MEDIAN N/CARPAL TUNNEL SURG      Carpal tunnel release     HC UGI ENDOSCOPY DIAG W BIOPSY  10/31/07     HC UGI ENDOSCOPY, SIMPLE EXAM  12/3/2007     OPEN REDUCTION INTERNAL FIXATION HIP NAILING Left 7/3/2016    Procedure: OPEN REDUCTION INTERNAL FIXATION HIP NAILING;  Surgeon: Lake Schulz MD;  Location:  OR        MEDICATIONS:  PTA Meds  Prior to Admission medications    Medication Sig Last Dose Taking? Auth Provider   ACETAMINOPHEN PO Take 650 mg by mouth every 4 hours as needed for pain For pain 1-5 out of 10   Reported, Patient   TRAMADOL HCL PO Take 50 mg by mouth every 6 hours as needed for moderate to severe pain Pain of 6-10 out of 10   Reported, Patient   cyanocobalamin (VITAMIN B12) 1000 MCG/ML injection Inject 1 mL into the muscle every 30 days   Reported, Patient   aspirin 81 MG tablet Take 1 tablet (81 mg) by mouth daily   Yareli Lorenzo MD   sodium bicarbonate 650 MG tablet Take 2 tablets (1,300 mg) by mouth 3 times daily   Yareli Lorenzo MD   nitroGLYcerin (NITROSTAT) 0.4 MG sublingual tablet Place 1 tablet (0.4 mg) under the tongue every 5 minutes as needed for chest pain   Yareli Lorenzo MD   gabapentin (NEURONTIN) 300 MG capsule Take 1 capsule (300 mg) by mouth At  Bedtime   Yareli Lorenzo MD   pravastatin (PRAVACHOL) 40 MG tablet Take 1 tablet (40 mg) by mouth daily   Yareli Lorenzo MD   metoprolol (LOPRESSOR) 50 MG tablet Take 0.5 tablets (25 mg) by mouth 2 times daily   Yareli Lorenzo MD   amLODIPine (NORVASC) 5 MG tablet Take 2 tablets (10 mg) by mouth daily   Yareli Lorenzo MD   ferrous sulfate (IRON) 325 (65 FE) MG tablet Take 1 tablet (325 mg) by mouth daily (with breakfast)   Yareli Lorenzo MD   calcium acetate (PHOSLO) 667 MG CAPS capsule Take 1 capsule (667 mg) by mouth 3 times daily (with meals)   Yareli Lorenzo MD   pentoxifylline (TRENTAL) 400 MG CR tablet Take 1 tablet (400 mg) by mouth daily   Yareli Lorenzo MD   cholecalciferol 2000 UNITS tablet Take 2,000 Units by mouth daily   Yareli Lorenzo MD   calcium carbonate-vitamin D 500-400 MG-UNIT TABS per tablet Take 1 tablet by mouth daily   Yareli Lorenzo MD   Lactobacillus (ACIDOPHILUS) tablet Take 1 tablet by mouth daily   Lake Pierre MD   ORDER FOR DME Equipment being ordered: cpap machine, mask, humidifier, tubing and filters   Dheeraj Jj MD      Current Meds    aspirin EC  81 mg Oral Daily     calcium acetate  667 mg Oral TID w/meals     calcium carb 1250 mg (500 mg [place])/vitamin D 200 units  1 tablet Oral Daily     cholecalciferol  2,000 Units Oral Daily     ferrous sulfate  325 mg Oral Daily with breakfast     lactobacillus rhamnosus (GG)  1 capsule Oral Daily     pentoxifylline  400 mg Oral Daily     pravastatin  40 mg Oral Daily     sodium bicarbonate  1,300 mg Oral TID     bumetanide  2 mg Intravenous Q8H     insulin aspart  1-3 Units Subcutaneous TID AC     insulin aspart  1-3 Units Subcutaneous At Bedtime     metoprolol  12.5 mg Oral BID     ipratropium - albuterol 0.5 mg/2.5 mg/3 mL  3 mL Nebulization 4x daily     Infusion Meds       ALLERGIES:    Allergies   Allergen Reactions     Ciprofloxacin Nausea and Vomiting     Zofran  did not help     Oxycodone Visual Disturbance     Delusions, blackouts      Lisinopril Cough     Sulfa Drugs GI Disturbance     LOSS OF TASTE       REVIEW OF SYSTEMS:  A 10 point review of systems was negative except as noted above.    SOCIAL HISTORY:   Social History     Social History     Marital status:      Spouse name: Urbano Banks     Number of children: 2     Years of education: 13     Occupational History     Ministry coordinator      [employer]     Social History Main Topics     Smoking status: Former Smoker     Years: 10.00     Quit date: 1969     Smokeless tobacco: Never Used     Alcohol use 0.0 oz/week     0 Standard drinks or equivalent per week      Comment: occasional- 2 drinks per month     Drug use: No     Sexual activity: Yes     Birth control/ protection: Surgical     Other Topics Concern     Parent/Sibling W/ Cabg, Mi Or Angioplasty Before 65f 55m? No     Social History Narrative[DM1.1]   [facility][DM1.2]     Reviewed with patient     FAMILY MEDICAL HISTORY:   Family History   Problem Relation Age of Onset     DIABETES Father      AODM     Neurologic Disorder Father      Parkinson's     Blood Disease Father       from blood clot from leg to lung immediately after hip fracture     DIABETES Paternal Grandmother      adult onset     DIABETES Maternal Grandmother      adult onset     Hypertension No family hx of      CEREBROVASCULAR DISEASE No family hx of      Reviewed with patient     PHYSICAL EXAM:   Temp  Av.8  F (36.6  C)  Min: 95.7  F (35.4  C)  Max: 99.4  F (37.4  C)      Pulse  Av.9  Min: 49  Max: 85 Resp  Avg: 15.4  Min: 6  Max: 26  SpO2  Av %  Min: 78 %  Max: 100 %  FiO2 (%)  Av.1 %  Min: 4 %  Max: 100 %       /69 (BP Location: Right arm)  Pulse 78  Temp 98.8  F (37.1  C) (Oral)  Resp 20  SpO2 100%   Date 17 07 - 17 0659   Shift 4795-7725 5678-3715 9700-8495 24 Hour Total    I  N  T  A  K  E   P.O. 240 120  360    Blood Components 290   290    Shift Total 530 120  650   O  U  T  P  U  T   Urine 1075 400  1475    Shift Total 1075 400  1475   Weight (kg)            Admit       GENERAL APPEARANCE:[DM1.1] NAD but on high flow NC   HEENT: anicteric, MMM[DM1.2]  Pulmonary: lungs clear[DM1.1] anteriorly[DM1.2]   CV: regular rhythm, normal rate, no rub   - Edema[DM1.1] - edema of ext x4 and dependent trunk spaces[DM1.2]   GI: soft, nontender, normal bowel sounds, no HSM   MS:[DM1.1] LEs bilat with healing ulcers with mild surround erythema of several,[DM1.2]   :[DM1.1] has Cee[DM1.2]   SKIN:[DM1.1] LEs bilat with venous stasis skin changes[DM1.2]   NEURO: mentation intact and speech normal    LABS:   CMP  Recent Labs  Lab 08/27/17  0836 08/27/17  0607 08/26/17  0637 08/22/17  1410   NA  --  146* 147* 147*   POTASSIUM  --  4.6 4.8 4.6   CHLORIDE  --  114* 118* 118*   CO2  --  23 22 20   ANIONGAP  --  8 8 9   GLC  --  117* 189* 257*   BUN  --  39* 35* 30   CR  --  4.55* 4.42* 4.48*   GFRESTIMATED  --  9* 10* 10*   GFRESTBLACK  --  11* 12* 12*   MALICK  --  6.1* 6.1* 6.3*   PHOS  --  6.3*  --  4.8*   PROTTOTAL  --   --  5.2*  --    ALBUMIN  --  1.8* 1.8* 2.0*   BILITOTAL 0.2  --  0.3  --    ALKPHOS  --   --  292*  --    AST  --   --  34  --    ALT  --   --  80*  --      CBC  Recent Labs  Lab 08/27/17  1417 08/27/17  0836 08/27/17  0720 08/27/17  0607 08/26/17  0637   HGB 7.8* 6.8* 6.2* 6.7* 7.4*   WBC  --  9.4 8.7 10.0 12.9*   RBC  --  2.71* 2.47* 2.71* 2.94*   HCT  --  23.1* 20.8* 23.0* 24.8*   MCV  --  85 84 85 84   MCH  --  25.1* 25.1* 24.7* 25.2*   MCHC  --  29.4* 29.8* 29.1* 29.8*   RDW  --  22.3* 22.1* 22.2* 22.6*   PLT  --  212 196 226 219     INR  Recent Labs  Lab 08/27/17  1013 08/27/17  0836 08/26/17  0637   INR 1.25* Unsatisfactory specimen - clotted 1.11   PTT 35 Unsatisfactory specimen - clotted  --      ABG  Recent Labs  Lab 08/26/17  1617 08/26/17  0638   PH  --  7.27*   PCO2  --   44   PO2  --  104   HCO3  --  20*   O2PER 60.0 70.0      URINE STUDIES  Recent Labs   Lab Test  08/26/17   1250  08/22/17   1418  08/13/17   1136  08/12/17   1800   07/02/16   0224  02/05/13   1016  01/31/13   1210  01/22/13   0921   COLOR  Straw  Light Yellow  Light Red  Yellow   < >  Yellow  Yellow  Yellow  Yellow   APPEARANCE  Slightly Cloudy  Slightly Cloudy  Cloudy  Cloudy   < >  Clear  Clear  Clear  Clear   URINEGLC  Negative  150*  Negative  Negative   < >  Negative  >=1000*  >=1000*  500*   URINEBILI  Negative  Negative  Negative  Negative   < >  Negative  Negative  Negative  Negative   URINEKETONE  Negative  Negative  Negative  Negative   < >  Negative  Negative  Negative  Negative   SG  1.004  1.007  1.020  1.015   < >  1.020  1.020  1.025  1.020   UBLD  Negative  Trace*  Moderate*  Large*   < >  Trace*  Negative  Negative  Small*   URINEPH  5.0  5.0  5.5  5.5   < >  6.0  6.0  6.0  6.0   PROTEIN  Negative  10*  100*  100*   < >  Negative  Trace*  Trace*  30*   UROBILINOGEN   --    --    --    --    --   0.2  0.2  0.2  0.2   NITRITE  Negative  Negative  Negative  Negative   < >  Negative  Positive*  Positive*  Negative   LEUKEST  Moderate*  Large*  Large*  Large*   < >  Negative  Small*  Trace*  Moderate*   RBCU  5*  5-10*  28*  >182*   < >  2-5*  O - 2  2-5*  10-25*   WBCU  7*  25-50*  4649*  2568*   < >  O - 2  *  *  >100*    < > = values in this interval not displayed.     Recent Labs   Lab Test  08/22/17   1418  07/17/17   1107  06/21/17   0848  06/14/17   1455  06/01/17   2045   UTPG  1.24*  1.29*  0.89*  1.31*  2.11*     PTH  Recent Labs   Lab Test  07/17/17   1054  06/21/17   0842   PTHI  726*  567*     IRON STUDIES  Recent Labs   Lab Test  08/22/17   1410  08/13/17   0552  07/17/17   1054  06/21/17   0842  06/14/17   1750  08/20/12   1146  06/07/12   1350   IRON  26*  20*  23*  36  26*  32*  15*   FEB  136*  112*  145*  209*  229*  285  323   IRONSAT  19  17  16  17  11*  11*  5*   GWEN   242  188  206  196  87   --   7*       IMAGING:[DM1.1]  CXR 8/26  Impression:   1. New bilateral perihilar and basilar opacities representing  pulmonary edema or infection.  2. Bilateral pleural effusions with associated atelectasis or  consolidations.    Renal US 8/13  IMPRESSION:Somewhat echogenic appearance of the renal parenchyma  compatible with medical renal disease. No hydronephrosis.   [DM1.2]    Mike Medina MD[DM1.1]         Revision History        User Key Date/Time User Provider Type Action    > DM1.3 8/27/2017  6:07 PM Mike Medina MD Resident Sign     DM1.2 8/27/2017  5:21 PM Mike Medina MD Resident      DM1.1 8/27/2017  4:24 PM Mike Medina MD Resident                      Progress Notes - Physician (Notes for yesterday and today)      Progress Notes by El Zhao MD at 8/31/2017 11:45 AM     Author:  El Zhao MD Service:  General Medicine Author Type:  Physician    Filed:  9/1/2017  7:32 AM Date of Service:  8/31/2017 11:45 AM Creation Time:  8/31/2017 11:45 AM    Status:  Signed :  El Zhao MD (Physician)           [facility] 1 Service - Internal Medicine Resident Progress Note  Date of Service: 08/31/2017    Patient: Kathryn Banks  MRN: [id number]  Admission Date: 8/26/2017  Hospital Day # 5    Assessment & Plan:  Kathryn Banks is a 77 year old female with 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      # MARTÍN on CKD  # Hypervolemia  # Cough  Persistent asymptomatic hypoxia (approx 85% ORA) and cough despite good response to diuresis.  Weight down approximately 10 kg since admission.  Remains afebrile with normal WBC.  Noted to have prolonged expiratory phase on exam consistent with obstruction.  Will trial  nebs and further O2 weaning today.  Transitioning from IV bumex to home toresmide regimen.   - Stop bumex   - continue supplemental O2, wean as tolerated  - Daily I/O, weight, BMP, avoid nephrotoxic agents  - Cee due to urinary retention in setting of diuresis  - venous mapping for fistula planning done this AM    # Asymptomatic chronic hypocalcemia  Corrected calcium 7.5 (albumin 1.8), magnesium recently 1.7.  Has been receiving calcium carbonate (500 mg bea) once daily.  Chronic hypocalcemia has been evaluated in the past and felt secondary to CKD.  Will switch to calcitriol and increase calcium carb to BID.    # Anemia  Hemoglobin found to be in 6s on 8/27. No current signs or symptoms of bleeding. Vitals are stable. Transfused 1 unit PRBCs on 8/27 with proper response in hemoglobin. Hgb 8.0 (stable from prior) 8/29. Hemolysis labs negative. Venofer recommended, but not tolerated by patient (shortness of breath and dizziness after initiating infusion).   - continue to monitor  - continue PO ferrous sulfate 325mg QD   - consider EPO in outpt setting through nephrology f/u  - chronic Hgb goal 10    # Afib s/p ablation 6/2017   Atrial fibrillation with RVR noted on telemetry 8/28 - confirmed on EKG. Asymptomatic and hemodynamically stable. Was followed by cardiology and determined okay to be off warfarin. No issues since ablation - possibly due to stretch from hypervolemia. Converted into NSR overnight.   - on PTA metoprolol  - continued diuresis as above  - continue telemetry      # CKD-BMD, secondary hyperPTH, VitaminD deficient  - continue phoslo, vitaminD, calcium carbonate  - last ferritin 242, continue ferrous supplement     # HTN  Was on decreased dose of metoprolol. MAPS have been creeping up over admission in setting of holding PTA amlodipine.   - continue PTA metoprolol 25   - restart amlodipine 5mg      # Decreased acuity of hearing on left  Likely secondary to eustachian tube dysfunction, in setting of  "nasal congestion. Complains of difficulty hearing on left, acute change overnight. Clear fluid present on examination behind TM on left, no s/s infection; exam otherwise benign. No visual changes, pain.  - Continue to monitor  - Consider nasal fluticasone if persists    # DM2  Diet control, likely because of deteriorating renal function. 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 However, platelets 212 on same date, and have been within the range of normal for this entire admission (250 on 8/29). No new thrombosis, no skin necrosis, and no systemic reaction documented; unclear rationale for why HIT lab was ordered. Based on this, would not have concerns about giving heparin in the future.    Chronic problems  # Vertical Diplopia: no change currently  # CAD s/p CABG : continue ASA, pravastatin  # Chronic diarrhea : check c diff ordered 8/28 for loose stool  # Gluteal cyst : stable, no I&D per last surgery     FEN: renal diet  DVT Prophylaxis:  SCD  Disposition: PT/OT, likely return to TCU  Code Status: FULL    Patient discussed with attending physician Dr. Zhao, who agrees with the above assessment and plan.     Mike Tadeo MD  PGY-2, Internal Medicine  [facility] 1  Pager: [phone]  ___________________________________________________________________    Subjective & Interval Hx:    No acute events overnight.  Endorses ongoing intermittent cough.  Denies shortness of breath, chest pain or any other concerns.  Eager to go to TCU.    Last 24 hr care team notes reviewed.   ROS:  4 point ROS including Respiratory, CV, GI and  (other than that noted in the HPI) is " negative    Medications:  Reviewed in EPIC. List below for reference    Physical Exam:  Blood pressure 142/71, pulse 80, temperature 97.5  F (36.4  C), temperature source Oral, resp. rate 18, weight 74.7 kg (164 lb 11.2 oz), SpO2 94 %.    I/O last 3 completed shifts:  In: 240 [P.O.:240]  Out: 3375 [Urine:3375]    General: AAOx3, pleasantly interactive  HEENT:  PERRL, EOMI, MMM.    Cardiac: RRR, Normal S1, S2.  Pulm: occasional wheezes,  Bilateral crackles, R>L, improved from yesterday's examination  Skin: No rash  MSK: no joint swelling, bilateral LE edema, improved from yesterday's examination  Psych: normal mood, full affect    Labs & Studies of Note:   Labs and studies reviewed in EPIC    Medications list for Reference:   Current Facility-Administered Medications   Medication     torsemide (DEMADEX) tablet 20 mg     ipratropium - albuterol 0.5 mg/2.5 mg/3 mL (DUONEB) neb solution 3 mL     calcium carb 1250 mg (500 mg [place])/vitamin D 200 units (OSCAL with D) per tablet 1 tablet     guaiFENesin-dextromethorphan (ROBITUSSIN DM) 100-10 MG/5ML syrup 5 mL     amLODIPine (NORVASC) tablet 5 mg     pramipexole (MIRAPEX) tablet 1 mg     melatonin tablet 1 mg     metoprolol (LOPRESSOR) tablet 25 mg     acetaminophen (TYLENOL) tablet 650 mg     aspirin EC EC tablet 81 mg     calcium acetate (PHOSLO) capsule 667 mg     cholecalciferol (vitamin D) tablet 2,000 Units     ferrous sulfate (IRON) tablet 325 mg     lactobacillus rhamnosus (GG) (CULTURELL) capsule 1 capsule     pentoxifylline (TRENtal) CR tablet 400 mg     pravastatin (PRAVACHOL) tablet 40 mg     sodium bicarbonate tablet 1,300 mg     naloxone (NARCAN) injection 0.1-0.4 mg     senna-docusate (SENOKOT-S;PERICOLACE) 8.6-50 MG per tablet 1-2 tablet     ondansetron (ZOFRAN-ODT) ODT tab 4 mg    Or     ondansetron (ZOFRAN) injection 4 mg     prochlorperazine (COMPAZINE) injection 5 mg    Or     prochlorperazine (COMPAZINE) tablet 5 mg    Or     prochlorperazine  (COMPAZINE) Suppository 12.5 mg     glucose 40 % gel 15-30 g    Or     dextrose 50 % injection 25-50 mL    Or     glucagon injection 1 mg     insulin aspart (NovoLOG) inj (RAPID ACTING)     insulin aspart (NovoLOG) inj (RAPID ACTING)     ipratropium - albuterol 0.5 mg/2.5 mg/3 mL (DUONEB) neb solution 3 mL[DO1.1]         Attending Attestation   This patient has been seen and evaluated by me, Benja Zhao MD.  I have discussed the patient and today's care plan with the house staff team and agree with the findings and plan in this note and any edits by me are indicated above in blue.      I have reviewed today's care team notes, Medications, Vital Signs, Labs and Imagingd    Benja Zhao MD  [facility] Hospitalist  Pager [phone][BT1.1]             Revision History        User Key Date/Time User Provider Type Action    > BT1.1 9/1/2017  7:32 AM El Zhao MD Physician Sign     DO1.1 8/31/2017  8:58 PM Mike Tadeo MD Resident Sign            Progress Notes by Gilda Mejia MD at 8/28/2017 10:43 AM     Author:  Gilda Mejia MD Service:  General Medicine Author Type:  Resident    Filed:  8/28/2017 10:57 AM Date of Service:  8/28/2017 10:43 AM Creation Time:  8/28/2017 10:43 AM    Status:  Attested :  Gilda Mejia MD (Resident)    Cosigner:  Rajan Epperson MD at 8/31/2017  3:04 PM        Attestation signed by Rajan Epperson MD at 8/31/2017  3:04 PM        Attestation:  Physician Attestation   I, Rajan Epperson, saw this patient with the resident and agree with the resident s findings and plan of care as documented in the resident s note.      I personally reviewed vital signs, medications and labs.    Key findings: Continues to diureses well.  Attempted bedside US to evaluate intravascular volume status.  Unable to assess IVC, but SVC visualized with JVP measured at 9cm. Will continue aggressive diuresis.     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 Multiple provocation from infection (cellulitis/ PNA/UTI) and was in the ICU before for respiratory distress. Cr on this admission was 4.4 similar to last discharge Cr, however unclear if this could be MARTÍN from a recovered Cr. Of note, patient was not discharged with diuretics. Less suspicous of PNA given no fever, no productive sputum, negative procal and diffuse B-line on bedside U/S. FEUrea supports pre-renal  - Stop all antibiotics, continue to monitor for fever  - IV bumex intermittent dosing targeting goal negative 1 L per day  - continue high flow nasal canula - ween as tolerated  - Daily I/O, weight, BMP, avoid nephrotoxic agents  - Cee's in because of urinary retention after getting diuresis  - bedside US today to establish fluid status     # Anemia  Hemoglobin found to be in 6s on 8/27. No current signs or symptoms of bleeding. Vitals are stable. Transfused 1 unit PRBCs on 8/27 and Hemoglobin came up to 7.8. Hemolysis labs negative.  - monitor CBC  - ordered peripheral smear before transfusion.     # Afib s/p ablation 6/2017   Atrial fibrillation with RVR noted on telemetry 8/28 - confirmed on EKG. Asymptomatic and hemodynamically stable. Was followed by cardiology determined okay to be off warfarin.  No issues since ablation - aggressive diuresis this admission could have caused decreased intravascular volume and precipitated the event.  - restart PTA dosing of metoprolol  - diuresis as above  - continue telemetry     # CKD-BMD, secondary hyperPTH, VitaminD deficient  - continue phoslo, vitaminD, calcium carbonate  - last ferritin 242, continue ferrous supplement     # HTN  Needing diuresis; thus will decrease dosing. Was on decreased dose of metoprolol  - hold amlodipine  - continue PTA metoprolol 25      # DM2  Diet control, likely because of deteriorating renal function. A1c 6/22/2017 = 7.3  - Low SSI, hypoglycemia protocol  - POCT q4 while NPO     # Chronic venous ulcer   W/u in last admission, not appear to be infected  - [facility] nurse consult  - Vascular surgery follow-up for outpatient TCO2 study, Dermatology f/u in 4-6 weeks  - Continue pentoxifylline       Chronic problems  # Vertical Diplopia: no change currently  # CAD s/p CABG : continue ASA, pracastatin  # Chronic diarrhea : check c diff ordered 8/28 for loose stool  # Gluteal cyst : stable, no I&D per last surgery     FEN: renal diet  DVT Prophylaxis:  SCD  Disposition: PT/OT, likely same TCU  Code Status: FULL    Patient staffed with Dr. Epperson who agrees with above plan    Gilda Mejia MD  Internal Medicine & Pediatrics PGY1  [facility]  Pager: [phone]    ___________________________________________________________________    Subjective & Interval Hx:    No acute events overnight. Notes she feels well today. Shortness of breath is improving. Continuing to diurese. Flipped into atrial fibrillation with RVR this morning confirmed on EKG. Asymptomatic and hemodynamically stable. Has history of a fib s/p ablation in 6/2017. No signs or symptoms of bleeding. Hemoglobin is stable.    Last 24 hr care team notes reviewed.   ROS:  4 point ROS including Respiratory, CV, GI and  (other than that noted in the HPI) is negative    Medications:  Reviewed in  EPIC. List below for reference    Physical Exam:[MM1.1]  Blood pressure 134/85, pulse 78, temperature 98.1  F (36.7  C), temperature source Axillary, resp. rate 22, weight 84.1 kg (185 lb 6.5 oz), SpO2 97 %.    I/O last 3 completed shifts:  In: 860 [P.O.:570]  Out: 3900 [Urine:3900][MM1.2]    General: AAOx3, pleasantly interactive  HEENT:  PERRL, EOMI, MMM   Cardiac: tachycardic with irregular rhythm. Normal S1, S2.  Pulm: occasional wheezes,  Bilateral inspiratory crackles.  Abd: +BS, soft, non distended, non tender. No hepatosplenomegaly.  Skin: No rash  MSK: No deformities, no joint swelling, edema at legs improving   Neuro: CN grossly intact, no focal deficits  Psych: normal mood, full affect    Labs & Studies of Note:   Labs and studies reviewed in EPIC[MM1.1]    Unresulted Labs Ordered in the Past 30 Days of this Admission     Date and Time Order Name Status Description    8/27/2017 0944 Heparin induced thrombocytopenia In process     8/27/2017 0813 Blood Morphology Pathologist Review In process     8/26/2017 0715 Blood culture Preliminary     8/26/2017 0715 Blood culture Preliminary     8/22/2017 1410 CRYOGLOBULIN QUANTITATIVE In process     8/12/2017 1800 ILA Preliminary     8/12/2017 1800 ILA Preliminary[MM1.2]           Medications list for Reference:[MM1.1]   Current Facility-Administered Medications   Medication     EPINEPHrine (ADRENALIN) kit 0.3 mg     diphenhydrAMINE (BENADRYL) injection 50 mg     methylPREDNISolone sodium succinate (solu-MEDROL) injection 125 mg     ranitidine (ZANTAC) injection 50 mg     iron sucrose (VENOFER) 200 mg in NaCl 0.9 % 100 mL intermittent infusion     acetaminophen (TYLENOL) tablet 650 mg     aspirin EC EC tablet 81 mg     calcium acetate (PHOSLO) capsule 667 mg     calcium carb 1250 mg (500 mg Craig)/vitamin D 200 units (OSCAL with D) per tablet 1 tablet     cholecalciferol (vitamin D) tablet 2,000 Units     ferrous sulfate (IRON) tablet 325 mg     lactobacillus  rhamnosus (GG) (CULTURELL) capsule 1 capsule     pentoxifylline (TRENtal) CR tablet 400 mg     pravastatin (PRAVACHOL) tablet 40 mg     sodium bicarbonate tablet 1,300 mg     naloxone (NARCAN) injection 0.1-0.4 mg     senna-docusate (SENOKOT-S;PERICOLACE) 8.6-50 MG per tablet 1-2 tablet     ondansetron (ZOFRAN-ODT) ODT tab 4 mg    Or     ondansetron (ZOFRAN) injection 4 mg     prochlorperazine (COMPAZINE) injection 5 mg    Or     prochlorperazine (COMPAZINE) tablet 5 mg    Or     prochlorperazine (COMPAZINE) Suppository 12.5 mg     glucose 40 % gel 15-30 g    Or     dextrose 50 % injection 25-50 mL    Or     glucagon injection 1 mg     insulin aspart (NovoLOG) inj (RAPID ACTING)     insulin aspart (NovoLOG) inj (RAPID ACTING)     metoprolol (LOPRESSOR) half-tab 12.5 mg     ipratropium - albuterol 0.5 mg/2.5 mg/3 mL (DUONEB) neb solution 3 mL     ipratropium - albuterol 0.5 mg/2.5 mg/3 mL (DUONEB) neb solution 3 mL[MM1.2]          Revision History        User Key Date/Time User Provider Type Action    > MM1.2 8/28/2017 10:57 AM Gilda Mejia MD Resident Sign     MM1.1 8/28/2017 10:43 AM Gilda Mejia MD Resident             Progress Notes by El Zhao MD at 8/30/2017  6:03 PM     Author:  El Zhao MD Service:  General Medicine Author Type:  Physician    Filed:  8/31/2017  6:38 AM Date of Service:  8/30/2017  6:03 PM Creation Time:  8/30/2017  6:03 PM    Status:  Signed :  El Zhao MD (Physician)           [facility] - Internal Medicine Resident Progress Note  Date of Service:[KT1.1] 08/30/2017[KT1.2]    Patient: Kathryn Banks  MRN: [id number]  Admission Date: 8/26/2017  Hospital Day #[KT1.1] 4[KT1.2]    Assessment & Plan:  Kathryn Banks is a 77 year old female with 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      # MARTÍN on CKD  # Hypervolemia 2/2 to HFpEF  Cardiorenal with fluid overload. Cr as of January 2017 was 1.2 but has been admitted with MARTÍN on CKI for 7 times in the past 2 months. Multiple provocations from infection (cellulitis/ PNA/UTI) and was in the ICU before for respiratory distress. At recent nephrology appointment, was having diarrhea and was thought to have fluid deficit; was encouraged to increase PO intake. However, continued with increased PO intake despite diarrhea resolution. Less suspicious of PNA: no fever, not producing sputum, negative procal and diffuse B-line on bedside U/S. FEUrea supports pre-renal. JVP ~11 on exam 8/29. Lung U/S 8/30 consistent with continued volume overload.   - Discontinued antibiotics, will continue to monitor for fever  - IV bumex intermittent dosing targeting goal negative 1-2L per day  - continue supplemental O2, wean as tolerated  - Daily I/O, weight, BMP, avoid nephrotoxic agents  - Cee due to urinary retention in setting of diuresis  - [facility] clinic referral completed  - consider cards consult  - likely PO diuretics at discharge with close follow up with PCP and nephrology  - venous mapping for fistula planning ordered    # Anemia  Hemoglobin found to be in 6s on 8/27. No current signs or symptoms of bleeding. Vitals are stable. Transfused 1 unit PRBCs on 8/27 with proper response in hemoglobin. Hgb 8.0 (stable from prior) 8/29. Hemolysis labs negative. Venofer recommended, but not tolerated by patient (shortness of breath and dizziness after initiating infusion).   - continue to monitor  - continue PO ferrous sulfate 325mg QD   - consider EPO in outpt setting through nephrology f/u  - chronic Hgb goal 10    # Afib s/p ablation 6/2017   Atrial fibrillation with RVR noted on telemetry 8/28 - confirmed on EKG. Asymptomatic and hemodynamically stable. Was followed by cardiology and determined okay to  "be off warfarin. No issues since ablation - possibly due to stretch from hypervolemia. Converted into NSR overnight.   - on PTA metoprolol  - continued diuresis as above  - continue telemetry      # CKD-BMD, secondary hyperPTH, VitaminD deficient  - continue phoslo, vitaminD, calcium carbonate  - last ferritin 242, continue ferrous supplement     # HTN  Was on decreased dose of metoprolol. MAPS have been creeping up over admission in setting of holding PTA amlodipine.   - continue PTA metoprolol 25   - restart amlodipine 5mg      # Decreased acuity of hearing on left  Likely secondary to eustachian tube dysfunction, in setting of nasal congestion. Complains of difficulty hearing on left, acute change overnight. Clear fluid present on examination behind TM on left, no s/s infection; exam otherwise benign. No visual changes, pain.  - Continue to monitor  - Consider nasal fluticasone if persists    # DM2  Diet control, likely because of deteriorating renal function. 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 However, platelets 212 on same date, and have been within the range of normal for this entire admission (250 on 8/29). No new thrombosis, no skin necrosis, and no systemic reaction documented; unclear rationale for why HIT lab was ordered. Based on this, would not have concerns about giving heparin in the future.    Chronic problems  # Vertical Diplopia: no change currently  # CAD s/p CABG : continue ASA, pravastatin  # Chronic diarrhea : check c diff ordered 8/28 for loose stool  # Gluteal cyst : stable, no I&D per last surgery     FEN: renal diet  DVT " Prophylaxis:  SCD  Disposition: PT/OT, likely return to TCU  Code Status: FULL    Patient discussed with attending physician Dr. Zhao, who agrees with the above assessment and plan.     Magan Gould MD, RUDDY  PGY-1, Internal Medicine  [facility]  Pager: [phone]  ___________________________________________________________________    Subjective & Interval Hx:    No acute events overnight. Notes she feels well today; shortness of breath continues to improve, but still coughing a bit. No signs or symptoms of bleeding. Hearing still diminished on left; no pain. Creatinine continues to downtrend slightly. Hemoglobin is stable/rising. Discussed venous mapping with patient as part of fistula planning.     Last 24 hr care team notes reviewed.   ROS:  4 point ROS including Respiratory, CV, GI and  (other than that noted in the HPI) is negative    Medications:  Reviewed in EPIC. List below for reference    Physical Exam:[KT1.1]  Blood pressure 125/61, pulse 80, temperature 99.4  F (37.4  C), temperature source Oral, resp. rate 16, weight 80 kg (176 lb 4.8 oz), SpO2 95 %.    I/O last 3 completed shifts:  In: 810 [P.O.:810]  Out: 3575 [Urine:3575][KT1.2]    General: AAOx3, pleasantly interactive  HEENT:  PERRL, EOMI, MMM.    Cardiac: RRR, Normal S1, S2.  Pulm: occasional wheezes,  Bilateral crackles, R>L, improved from yesterday's examination  Skin: No rash  MSK: no joint swelling, bilateral LE edema, improved from yesterday's examination  Psych: normal mood, full affect    Labs & Studies of Note:   Labs and studies reviewed in EPIC    Medications list for Reference:[KT1.1]   Current Facility-Administered Medications   Medication     guaiFENesin-dextromethorphan (ROBITUSSIN DM) 100-10 MG/5ML syrup 5 mL     amLODIPine (NORVASC) tablet 5 mg     pramipexole (MIRAPEX) tablet 1 mg     metoprolol (LOPRESSOR) tablet 25 mg     bumetanide (BUMEX) injection 2 mg     acetaminophen (TYLENOL) tablet 650 mg     aspirin EC EC tablet  81 mg     calcium acetate (PHOSLO) capsule 667 mg     calcium carb 1250 mg (500 mg [place])/vitamin D 200 units (OSCAL with D) per tablet 1 tablet     cholecalciferol (vitamin D) tablet 2,000 Units     ferrous sulfate (IRON) tablet 325 mg     lactobacillus rhamnosus (GG) (CULTURELL) capsule 1 capsule     pentoxifylline (TRENtal) CR tablet 400 mg     pravastatin (PRAVACHOL) tablet 40 mg     sodium bicarbonate tablet 1,300 mg     naloxone (NARCAN) injection 0.1-0.4 mg     senna-docusate (SENOKOT-S;PERICOLACE) 8.6-50 MG per tablet 1-2 tablet     ondansetron (ZOFRAN-ODT) ODT tab 4 mg    Or     ondansetron (ZOFRAN) injection 4 mg     prochlorperazine (COMPAZINE) injection 5 mg    Or     prochlorperazine (COMPAZINE) tablet 5 mg    Or     prochlorperazine (COMPAZINE) Suppository 12.5 mg     glucose 40 % gel 15-30 g    Or     dextrose 50 % injection 25-50 mL    Or     glucagon injection 1 mg     insulin aspart (NovoLOG) inj (RAPID ACTING)     insulin aspart (NovoLOG) inj (RAPID ACTING)     ipratropium - albuterol 0.5 mg/2.5 mg/3 mL (DUONEB) neb solution 3 mL[KT1.2]     Attending Attestation   This patient has been seen and evaluated by me, Benja Zhao MD.  I have discussed the patient and today's care plan with the house staff team and agree with the findings and plan in this note and any edits by me are indicated above in blue.      I have reviewed today's care team notes, Medications, Vital Signs and Labs    Benja Zhao MD  Med-Peds Hospitalist  Pager [phone][BT1.1]           Revision History        User Key Date/Time User Provider Type Action    > BT1.1 8/31/2017  6:38 AM El Zhao MD Physician Sign     KT1.2 8/30/2017  6:13 PM Magan Gould MD Resident Sign     KT1.1 8/30/2017  6:03 PM Magan Gould MD Resident                   Procedure Notes     No notes of this type exist for this encounter.         Progress Notes - Therapies (Notes from 08/29/17 through 09/01/17)      Progress Notes  "by Elm, Cornell, OT at 8/29/2017 12:26 PM     Author:  Cornell Titus OT Service:  (none) Author Type:  Occupational Therapist    Filed:  8/29/2017 12:26 PM Date of Service:  8/29/2017 12:26 PM Creation Time:  8/29/2017 12:26 PM    Status:  Signed :  Cornell Titus OT (Occupational Therapist)          08/29/17 0900   Quick Adds   Type of Visit Initial Occupational Therapy Evaluation   Living Environment   Lives With friend(s);grandchild(zoe);spouse   Living Arrangements house   Home Accessibility bed and bath on same level;grab bars present (bathtub)   Number of Stairs to Enter Home 0   Number of Stairs Within Home 0   Transportation Available family or friend will provide   Self-Care   Dominant Hand right   Usual Activity Tolerance moderate   Current Activity Tolerance poor   Equipment Currently Used at Home grab bar;shower chair;walker, rolling;walker, standard;other (see comments)  (scooter chair for long distances)   Functional Level Prior   Ambulation 1-->assistive equipment   Transferring 1-->assistive equipment   Toileting 1-->assistive equipment   Bathing 1-->assistive equipment   Dressing 1-->assistive equipment   Eating 0-->independent   Communication 0-->understands/communicates without difficulty   Swallowing 0-->swallows foods/liquids without difficulty   Cognition 1 - attention or memory deficits   Fall history within last six months no   Which of the above functional risks had a recent onset or change? ambulation;transferring;toileting;bathing;dressing;cognition   General Information   Onset of Illness/Injury or Date of Surgery - Date 08/26/17   Referring Physician Cameron Burrell MD   Patient/Family Goals Statement Pt would like to return to PLOF and be able to function independently with FWW and AE at home.    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, DM2 not on " "medication, Afib s/p ablation not on warfarin, HTN, CAD s/p CABGx5, HFpEF (EF 60-65%, Aug2017), Chronic venous stasis ulcer, Gluteal cyst, presenting from TCU with progressive SOB and cough x 2 days\"   Precautions/Limitations fall precautions   Weight-Bearing Status - LUE full weight-bearing   Weight-Bearing Status - RUE full weight-bearing   Weight-Bearing Status - LLE full weight-bearing   Weight-Bearing Status - RLE full weight-bearing   Cognitive Status Examination   Orientation orientation to person, place and time   Level of Consciousness alert   Able to Follow Commands WNL/WFL   Personal Safety (Cognitive) WNL/WFL   Visual Perception   Visual Perception No deficits were identified   Visual Perception Comments Pt able to read clock at 10ft and menu a 1ft without difficulty.    Sensory Examination   Sensory Comments Pt reports occasional tingling in BLE's. Pt able to feel light touch, and pressure throughout BUE's and BLE's.   Pain Assessment   Patient Currently in Pain No   Range of Motion (ROM)   ROM Comment BUE AROM WFL   Strength   Strength Comments BUE strength grossly 4/5 MMT. Pt demonstrates a moderate deficit in  strength.    Transfer Skills   Transfer Comments CGA/Min A sit<->standing with FWW. Min A, FWW and vc's for prolonged standing.    Instrumental Activities of Daily Living (IADL)   Previous Responsibilities meal prep;housekeeping;laundry;yardwork;medication management;finances   Activities of Daily Living Analysis   Impairments Contributing to Impaired Activities of Daily Living balance impaired;fear and anxiety;flexibility decreased;postural control impaired;strength decreased   General Therapy Interventions   Planned Therapy Interventions ADL retraining;IADL retraining;bed mobility training;cognition;ROM;strengthening;stretching;transfer training;home program guidelines;progressive activity/exercise   Clinical Impression   Criteria for Skilled Therapeutic Interventions Met yes, treatment " "indicated   OT Diagnosis Decreased independence with ADLs/IADLs, and functional transfers.    Influenced by the following impairments decreased strength, activity tolerance, increase O2 demands.    Assessment of Occupational Performance 3-5 Performance Deficits   Identified Performance Deficits Decreased independence and tolerance for ADLs/IADLs. Decreased independence with funcitonal transfers.    Clinical Decision Making (Complexity) Low complexity   Therapy Frequency 5 times/wk   Predicted Duration of Therapy Intervention (days/wks) 9/4/2017   Anticipated Discharge Disposition Transitional Care Facility   Risks and Benefits of Treatment have been explained. Yes   Patient, Family & other staff in agreement with plan of care Yes   [facility] TM \"6 Clicks\"   2016, Trustees of [employer], under license to [employer].  All rights reserved.   6 Clicks Short Forms Daily Activity Inpatient Short Form   [facility]  \"6 Clicks\" Daily Activity Inpatient Short Form   1. Putting on and taking off regular lower body clothing? 2 - A Lot   2. Bathing (including washing, rinsing, drying)? 2 - A Lot   3. Toileting, which includes using toilet, bedpan or urinal? 2 - A Lot   4. Putting on and taking off regular upper body clothing? 3 - A Little   5. Taking care of personal grooming such as brushing teeth? 3 - A Little   6. Eating meals? 4 - None   Daily Activity Raw Score (Score out of 24.Lower scores equate to lower levels of function) 16   Total Evaluation Time   Total Evaluation Time (Minutes) 15[KE1.1]        Revision History        User Key Date/Time User Provider Type Action    > KE1.1 8/29/2017 12:26 PM Cornell Titus OT Occupational Therapist Sign                                                      INTERAGENCY TRANSFER FORM - LAB / IMAGING / EKG / EMG RESULTS   8/26/2017                       [address] [facility] BANK: [phone]            Unresulted Labs (24h ago through future)    Start    "    Ordered    09/01/17 0000  INR  Routine      09/01/17 1417         Lab Results - 3 Days      Glucose by meter [[id number]] (Abnormal)  Resulted: 09/01/17 1251, Result status: Final result    Ordering provider: Gold Tobin MD  09/01/17 1243 Resulting lab: POINT OF CARE TEST, GLUCOSE    Specimen Information    Type Source Collected On     09/01/17 1243          Components       Value Reference Range Flag Lab   Glucose 115 70 - 99 mg/dL H 170            Glucose by meter [[id number]] (Abnormal)  Resulted: 09/01/17 1115, Result status: Final result    Ordering provider: Gold Tobin MD  09/01/17 1113 Resulting lab: POINT OF CARE TEST, GLUCOSE    Specimen Information    Type Source Collected On     09/01/17 1113          Components       Value Reference Range Flag Lab   Glucose 134 70 - 99 mg/dL H 170            Glucose by meter [[id number]]  Resulted: 09/01/17 0941, Result status: Final result    Ordering provider: Gold Tobin MD  09/01/17 0928 Resulting lab: POINT OF CARE TEST, GLUCOSE    Specimen Information    Type Source Collected On     09/01/17 0928          Components       Value Reference Range Flag Lab   Glucose 85 70 - 99 mg/dL  170            Basic metabolic panel [[id number]] (Abnormal)  Resulted: 09/01/17 0732, Result status: Final result    Ordering provider: Mike Tadeo MD  09/01/17 0001 Resulting lab: [facility]    Specimen Information    Type Source Collected On   Blood  09/01/17 0637          Components       Value Reference Range Flag Lab   Sodium 145 133 - 144 mmol/L H 51   Potassium 3.5 3.4 - 5.3 mmol/L  51   Chloride 106 94 - 109 mmol/L  51   Carbon Dioxide 31 20 - 32 mmol/L  51   Anion Gap 8 3 - 14 mmol/L  51   Glucose 101 70 - 99 mg/dL H 51   Urea Nitrogen 37 7 - 30 mg/dL H 51   Creatinine 4.78 0.52 - 1.04 mg/dL H 51   GFR Estimate 9 >60 mL/min/1.7m2 L 51   Comment:  Non  GFR Calc   GFR Estimate If Black 11 >60  mL/min/1.7m2 L 51   Comment:  African American GFR Calc   Calcium 6.1 8.5 - 10.1 mg/dL L 51            CBC with platelets differential [[id number]] (Abnormal)  Resulted: 09/01/17 0715, Result status: Final result    Ordering provider: Mike Tadeo MD  09/01/17 0001 Resulting lab: [facility]    Specimen Information    Type Source Collected On   Blood  09/01/17 0637          Components       Value Reference Range Flag Lab   WBC 5.1 4.0 - 11.0 10e9/L  51   RBC Count 3.17 3.8 - 5.2 10e12/L L 51   Hemoglobin 8.2 11.7 - 15.7 g/dL L 51   Hematocrit 27.9 35.0 - 47.0 % L 51   MCV 88 78 - 100 fl  51   MCH 25.9 26.5 - 33.0 pg L 51   MCHC 29.4 31.5 - 36.5 g/dL L 51   RDW 19.6 10.0 - 15.0 % H 51   Platelet Count 265 150 - 450 10e9/L  51   Diff Method Automated Method   51   % Neutrophils 62.4 %  51   % Lymphocytes 24.6 %  51   % Monocytes 7.5 %  51   % Eosinophils 3.1 %  51   % Basophils 1.4 %  51   % Immature Granulocytes 1.0 %  51   Nucleated RBCs 0 0 /100  51   Absolute Neutrophil 3.2 1.6 - 8.3 10e9/L  51   Absolute Lymphocytes 1.3 0.8 - 5.3 10e9/L  51   Absolute Monocytes 0.4 0.0 - 1.3 10e9/L  51   Absolute Eosinophils 0.2 0.0 - 0.7 10e9/L  51   Absolute Basophils 0.1 0.0 - 0.2 10e9/L  51   Abs Immature Granulocytes 0.1 0 - 0.4 10e9/L  51   Absolute Nucleated RBC 0.0   51            Blood culture [[id number]]  Resulted: 09/01/17 0650, Result status: Final result    Ordering provider: Denny Marie MD  08/26/17 0715 Resulting lab: [facility]    Specimen Information    Type Source Collected On   Blood Arm, Left 08/26/17 0747   Comment:  Right Arm          Components       Value Reference Range Flag Lab   Specimen Description Blood Right Arm      Culture Micro No growth   225            Blood culture [[id number]]  Resulted: 09/01/17 0650, Result status: Final result    Ordering provider: Denny Marie MD  08/26/17 0715 Resulting lab:  [facility]    Specimen Information    Type Source Collected On   Blood Arm, Right 08/26/17 0637   Comment:  Left Arm          Components       Value Reference Range Flag Lab   Specimen Description Blood Left Arm      Culture Micro No growth   225            Glucose by meter [[id number]] (Abnormal)  Resulted: 09/01/17 0336, Result status: Final result    Ordering provider: Gold Tobin MD  09/01/17 0209 Resulting lab: POINT OF CARE TEST, GLUCOSE    Specimen Information    Type Source Collected On     09/01/17 0209          Components       Value Reference Range Flag Lab   Glucose 135 70 - 99 mg/dL H 170            Glucose by meter [[id number]] (Abnormal)  Resulted: 08/31/17 2120, Result status: Final result    Ordering provider: Gold Tobin MD  08/31/17 2107 Resulting lab: POINT OF CARE TEST, GLUCOSE    Specimen Information    Type Source Collected On     08/31/17 2107          Components       Value Reference Range Flag Lab   Glucose 173 70 - 99 mg/dL H 170            Glucose by meter [[id number]] (Abnormal)  Resulted: 08/31/17 1716, Result status: Final result    Ordering provider: Gold Tobin MD  08/31/17 1712 Resulting lab: POINT OF CARE TEST, GLUCOSE    Specimen Information    Type Source Collected On     08/31/17 1712          Components       Value Reference Range Flag Lab   Glucose 123 70 - 99 mg/dL H 170            Glucose by meter [[id number]] (Abnormal)  Resulted: 08/31/17 1125, Result status: Final result    Ordering provider: Gold Tobin MD  08/31/17 1119 Resulting lab: POINT OF CARE TEST, GLUCOSE    Specimen Information    Type Source Collected On     08/31/17 1119          Components       Value Reference Range Flag Lab   Glucose 139 70 - 99 mg/dL H 170            Glucose by meter [[id number]] (Abnormal)  Resulted: 08/31/17 0745, Result status: Final result    Ordering provider: Gold Tobin MD  08/31/17 0731 Resulting  lab: POINT OF CARE TEST, GLUCOSE    Specimen Information    Type Source Collected On     08/31/17 0731          Components       Value Reference Range Flag Lab   Glucose 108 70 - 99 mg/dL H 170            Basic metabolic panel [[id number]] (Abnormal)  Resulted: 08/31/17 0745, Result status: Final result    Ordering provider: Magan Gould MD  08/31/17 0001 Resulting lab: [facility]    Specimen Information    Type Source Collected On   Blood  08/31/17 0627          Components       Value Reference Range Flag Lab   Sodium 148 133 - 144 mmol/L H 51   Potassium 3.6 3.4 - 5.3 mmol/L  51   Chloride 110 94 - 109 mmol/L H 51   Carbon Dioxide 32 20 - 32 mmol/L  51   Anion Gap 6 3 - 14 mmol/L  51   Glucose 101 70 - 99 mg/dL H 51   Urea Nitrogen 38 7 - 30 mg/dL H 51   Creatinine 4.84 0.52 - 1.04 mg/dL H 51   GFR Estimate 9 >60 mL/min/1.7m2 L 51   Comment:  Non  GFR Calc   GFR Estimate If Black 11 >60 mL/min/1.7m2 L 51   Comment:  African American GFR Calc   Calcium 5.7 8.5 - 10.1 mg/dL LL 51   Comment:         Critical Value called to and read back by  DENA ANAND RN AT 0745 ON 8/31/17 [facility]              CBC with platelets [[id number]] (Abnormal)  Resulted: 08/31/17 0700, Result status: Final result    Ordering provider: Magan Gould MD  08/31/17 0001 Resulting lab: [facility]    Specimen Information    Type Source Collected On   Blood  08/31/17 0627          Components       Value Reference Range Flag Lab   WBC 5.4 4.0 - 11.0 10e9/L  51   RBC Count 3.02 3.8 - 5.2 10e12/L L 51   Hemoglobin 7.7 11.7 - 15.7 g/dL L 51   Hematocrit 26.0 35.0 - 47.0 % L 51   MCV 86 78 - 100 fl  51   MCH 25.5 26.5 - 33.0 pg L 51   MCHC 29.6 31.5 - 36.5 g/dL L 51   RDW 20.2 10.0 - 15.0 % H 51   Platelet Count 256 150 - 450 10e9/L  51            Glucose by meter [[id number]] (Abnormal)  Resulted: 08/30/17 2110, Result status: Final result    Ordering  provider: Gold Tobin MD  08/30/17 2106 Resulting lab: POINT OF CARE TEST, GLUCOSE    Specimen Information    Type Source Collected On     08/30/17 2106          Components       Value Reference Range Flag Lab   Glucose 145 70 - 99 mg/dL H 170            Glucose by meter [[id number]] (Abnormal)  Resulted: 08/30/17 2005, Result status: Final result    Ordering provider: Gold Tobin MD  08/30/17 1810 Resulting lab: POINT OF CARE TEST, GLUCOSE    Specimen Information    Type Source Collected On     08/30/17 1810          Components       Value Reference Range Flag Lab   Glucose 194 70 - 99 mg/dL H 170            Glucose by meter [[id number]] (Abnormal)  Resulted: 08/30/17 1246, Result status: Final result    Ordering provider: Gold Tobin MD  08/30/17 1237 Resulting lab: POINT OF CARE TEST, GLUCOSE    Specimen Information    Type Source Collected On     08/30/17 1237          Components       Value Reference Range Flag Lab   Glucose 160 70 - 99 mg/dL H 170   Comment:  /RN Notified            Basic metabolic panel [[id number]] (Abnormal)  Resulted: 08/30/17 1017, Result status: Final result    Ordering provider: Magan Gould MD  08/30/17 0747 Resulting lab: [facility]    Specimen Information    Type Source Collected On   Blood  08/30/17 0924          Components       Value Reference Range Flag Lab   Sodium 145 133 - 144 mmol/L H 51   Potassium 3.9 3.4 - 5.3 mmol/L  51   Chloride 111 94 - 109 mmol/L H 51   Carbon Dioxide 26 20 - 32 mmol/L  51   Anion Gap 9 3 - 14 mmol/L  51   Glucose 186 70 - 99 mg/dL H 51   Urea Nitrogen 38 7 - 30 mg/dL H 51   Creatinine 4.87 0.52 - 1.04 mg/dL H 51   GFR Estimate 9 >60 mL/min/1.7m2 L 51   Comment:  Non  GFR Calc   GFR Estimate If Black 11 >60 mL/min/1.7m2 L 51   Comment:  African American GFR Calc   Calcium 6.0 8.5 - 10.1 mg/dL L 51            CBC with platelets [[id number]] (Abnormal)   Resulted: 08/30/17 1006, Result status: Final result    Ordering provider: Magan Gould MD  08/30/17 0747 Resulting lab: [facility]    Specimen Information    Type Source Collected On   Blood  08/30/17 0924          Components       Value Reference Range Flag Lab   WBC 6.8 4.0 - 11.0 10e9/L  51   RBC Count 3.44 3.8 - 5.2 10e12/L L 51   Hemoglobin 8.9 11.7 - 15.7 g/dL L 51   Hematocrit 29.6 35.0 - 47.0 % L 51   MCV 86 78 - 100 fl  51   MCH 25.9 26.5 - 33.0 pg L 51   MCHC 30.1 31.5 - 36.5 g/dL L 51   RDW 20.7 10.0 - 15.0 % H 51   Platelet Count 291 150 - 450 10e9/L  51            Glucose by meter [[id number]] (Abnormal)  Resulted: 08/30/17 0735, Result status: Final result    Ordering provider: Gold Tobin MD  08/30/17 0715 Resulting lab: POINT OF CARE TEST, GLUCOSE    Specimen Information    Type Source Collected On     08/30/17 0715          Components       Value Reference Range Flag Lab   Glucose 118 70 - 99 mg/dL H 170            Glucose by meter [[id number]] (Abnormal)  Resulted: 08/29/17 2216, Result status: Final result    Ordering provider: Gold Tobin MD  08/29/17 2157 Resulting lab: POINT OF CARE TEST, GLUCOSE    Specimen Information    Type Source Collected On     08/29/17 2157          Components       Value Reference Range Flag Lab   Glucose 304 70 - 99 mg/dL H 170            Glucose by meter [[id number]] (Abnormal)  Resulted: 08/29/17 1735, Result status: Final result    Ordering provider: Gold Tobin MD  08/29/17 1728 Resulting lab: POINT OF CARE TEST, GLUCOSE    Specimen Information    Type Source Collected On     08/29/17 1728          Components       Value Reference Range Flag Lab   Glucose 231 70 - 99 mg/dL H 170            Glucose by meter [[id number]] (Abnormal)  Resulted: 08/29/17 1635, Result status: Final result    Ordering provider: Gold Tobin MD  08/29/17 1631 Resulting lab: POINT OF CARE TEST, GLUCOSE     Specimen Information    Type Source Collected On     08/29/17 1631          Components       Value Reference Range Flag Lab   Glucose 260 70 - 99 mg/dL H 170            Glucose by meter [[id number]] (Abnormal)  Resulted: 08/29/17 1155, Result status: Final result    Ordering provider: Gold Tobin MD  08/29/17 1150 Resulting lab: POINT OF CARE TEST, GLUCOSE    Specimen Information    Type Source Collected On     08/29/17 1150          Components       Value Reference Range Flag Lab   Glucose 159 70 - 99 mg/dL H 170            Basic metabolic panel [[id number]] (Abnormal)  Resulted: 08/29/17 0651, Result status: Final result    Ordering provider: Gilda Mejia MD  08/28/17 2200 Resulting lab: [facility]    Specimen Information    Type Source Collected On   Blood  08/29/17 0555          Components       Value Reference Range Flag Lab   Sodium 149 133 - 144 mmol/L H 51   Potassium 4.2 3.4 - 5.3 mmol/L  51   Chloride 113 94 - 109 mmol/L H 51   Carbon Dioxide 27 20 - 32 mmol/L  51   Anion Gap 9 3 - 14 mmol/L  51   Glucose 113 70 - 99 mg/dL H 51   Urea Nitrogen 41 7 - 30 mg/dL H 51   Creatinine 4.95 0.52 - 1.04 mg/dL H 51   GFR Estimate 9 >60 mL/min/1.7m2 L 51   Comment:  Non  GFR Calc   GFR Estimate If Black 10 >60 mL/min/1.7m2 L 51   Comment:  African American GFR Calc   Calcium 6.0 8.5 - 10.1 mg/dL L 51            Magnesium [[id number]]  Resulted: 08/29/17 0651, Result status: Final result    Ordering provider: Gilda Mejia MD  08/28/17 2200 Resulting lab: [facility]    Specimen Information    Type Source Collected On   Blood  08/29/17 0555          Components       Value Reference Range Flag Lab   Magnesium 1.7 1.6 - 2.3 mg/dL  51            CBC with platelets [[id number]] (Abnormal)  Resulted: 08/29/17 0634, Result status: Final result    Ordering provider: Gilda Mejia MD  08/28/17 2200 Resulting  lab: [facility]    Specimen Information    Type Source Collected On   Blood  08/29/17 0555          Components       Value Reference Range Flag Lab   WBC 6.3 4.0 - 11.0 10e9/L  51   RBC Count 3.10 3.8 - 5.2 10e12/L L 51   Hemoglobin 8.0 11.7 - 15.7 g/dL L 51   Hematocrit 26.8 35.0 - 47.0 % L 51   MCV 87 78 - 100 fl  51   MCH 25.8 26.5 - 33.0 pg L 51   MCHC 29.9 31.5 - 36.5 g/dL L 51   RDW 21.5 10.0 - 15.0 % H 51   Platelet Count 250 150 - 450 10e9/L  51            Glucose by meter [[id number]] (Abnormal)  Resulted: 08/29/17 0250, Result status: Final result    Ordering provider: Gold Tobin MD  08/29/17 0249 Resulting lab: POINT OF CARE TEST, GLUCOSE    Specimen Information    Type Source Collected On     08/29/17 0249          Components       Value Reference Range Flag Lab   Glucose 123 70 - 99 mg/dL H 170            Testing Performed By     Lab - Abbreviation Name Director Address Valid Date Range    51 - Unknown [facility] Unknown [address] [facility] [address] 12/31/14 1010 - Present    170 - Unknown POINT OF CARE TEST, GLUCOSE Unknown Unknown 10/31/11 1114 - Present    225 - Unknown [facility] Unknown [address] [facility] [address] 12/19/14 0954 - Present               Imaging Results - 3 Days      US Upper Extremity Venous Mapping Left [[id number]]  Resulted: 09/01/17 1244, Result status: Final result    Ordering provider: Magan Gould MD  08/31/17 0005 Resulted by: Aurora Rojo MD Pogatchnik, Brian P, MD    Performed: 08/31/17 0908 - 08/31/17 1004 Resulting lab: RADIOLOGY RESULTS    Narrative:       Exam: Ultrasound arterial and venous mapping of the left upper  extremities dated  8/31/2017 10:04 AM    Clinical information: Vein mapping prior to the possible creation of  an AV fistula.    Ordering provider: Bryanna    Technique: Grayscale (B-mode) and Duplex ultrasound  of the upper  extremity arteries and veins. Velocity measurements obtained with  angle correction at or less than 60 degrees. Compressibility of veins  performed where feasible.    Findings:     Arterial evaluation (peak systolic velocities):    Left:  Brachial mid: 103 cm/sec, 4.5 mm diameter  Brachial antecubital fossa: 87 4.5 mm diameter  Radial origin: 82 cm/s 3.9 mm diameter  Radial wrist: 100 cm/sec, 1.9 mm diameter  Ulnar origin: 51 cm/sec, 4.4 mm diameter  Ulnar wrist: 99 cm/s, 1.9 mm diameter    Venous evaluation    Left Upper Extremity:     IJV: Thrombus: no, Phasic: Yes, 20 cm/sec   Innominate vein: Thrombus: no, Phasic: Yes, 50 cm/sec   SCV mid: Thrombus: no, Phasic: Yes, 60 cm/sec   Axillary vein: Thrombus: no, Phasic: Yes, 14 cm/sec    Left cephalic vein:      Proximal humerus: Thrombus: no, Wall thickened: no,  3.2 mm   Mid humerus: Thrombus: no, Wall thickened: no,  4.2 mm   Distal humerus: Thrombus: no, Wall thickened: no,  4.0 mm   Antecubital fossa: Thrombus: no, Wall thickened: no,  5.4 mm   Proximal forearm: Thrombus: no, Wall thickened: no,  2.5, 3.1 mm   Mid forearm: Thrombus: no, Wall thickened: no,  2.1 mm   Wrist: Thrombus: no, Wall thickened: no,  1.7 mm    Left basilic vein:     Proximal arm: Thrombus: no, Wall thickened: no,  3.8 mm   Mid arm: Thrombus: no, Wall thickened: no,  3.6 mm   Distal arm: Thrombus: no, Wall thickened: no,  3.4 mm   Antecubital fossa: Thrombus: no, Wall thickened: no,  2.8 mm      Impression:       Impression:     1. Cephalic, and basilic veins measured as described above.    2. No upper extremity deep venous thrombus identified.    3. No superficial venous thrombus noted.     4. Patent upper extremity arteries as detailed.    I have personally reviewed the examination and initial interpretation  and I agree with the findings.    PROSPER LARRY MD      XR Chest 2 Views [[id number]]  Resulted: 08/31/17 1616, Result status: Final result    Ordering provider:  Mike Tadeo MD  08/31/17 0848 Resulted by: Zahira Guidry MD Wickre, Mark, MD    Performed: 08/31/17 1207 - 08/31/17 1213 Resulting lab: RADIOLOGY RESULTS    Narrative:       XR CHEST 2 VW 8/31/2017 12:13 PM    History: Recent history pulmonary edema, persistent new O2 requirement    Comparison: 8/26/2017    Findings: PA and lateral views of the chest. Intact median sternotomy  wires. The heart size is within normal limits. Left basilar opacities  are improved compared to prior. Stable bilateral pleural effusions,  left greater than right. Improved bilateral perihilar hazy opacities.  No significant pneumothorax.      Impression:       Impression:     1. Compared to 8/26/2017, there is been improvement in left basilar  opacities and bilateral perihilar opacities compatible with improving  pulmonary edema.     2. Overall stable moderate left pleural effusion and small right  pleural effusion.     I have personally reviewed the examination and initial interpretation  and I agree with the findings.    ZAHIRA GUIDRY MD      Testing Performed By     Lab - Abbreviation Name Director Address Valid Date Range    104 - Rad Rslts RADIOLOGY RESULTS Unknown Unknown 02/16/05 1553 - Present            Encounter-Level Documents:     There are no encounter-level documents.      Order-Level Documents:     There are no order-level documents.

## 2017-08-26 NOTE — ED NOTES
Patient BIBA on CPAP from nursing facility. Patient had low oxygen saturation and was put on approximately 9 liters overnight and maintained oxygen saturation of 90%. EMS recorded 85-88% on 10 LPM and then applied CPAP and oxygen saturation increased to 92%. RT and MD at time of arrival, BIPAP initiated.

## 2017-08-26 NOTE — ED PROVIDER NOTES
History     Chief Complaint   Patient presents with     Shortness of Breath     HPI  Kathryn Banks is a 77 year old female with a history of coronary disease, diabetes, and end-stage renal disease not on dialysis yet who presents for shortness breath or respiratory distress.  Patient was just admitted in the hospital from August 12-18 for pulmonary edema and suspected sepsis from lung or urine infection.  She was discharged to nursing home and there was discussion of proceeding with ounces however she does not have a fistula or port placement yet.  Patient's creatinine is improved at the nursing home after they stopped her Lasix she reports.  She over states that over the last several days she's had increasing swelling in her legs, pelvis, hands, and shortness of breath.  Last night she was unable to breathe and could not lay flat with cough and clear sputum production.  Patient denies any chest pain, no nausea vomiting, no abdominal pain, no lightheadedness or syncope.    I have reviewed the Medications, Allergies, Past Medical and Surgical History, and Social History in the [email or web address] system.  Past Medical History:   Diagnosis Date     Carpal tunnel syndrome      Coronary atherosclerosis of native coronary artery 2006    5 vessel CABG     OBSTRUCTIVE SLEEP APNEA     using CPAP     Osteoarthrosis, unspecified whether generalized or localized, unspecified site     generalized arthritis, particularly in her hands and feet         Other and combined forms of senile cataract 2000    Bilateral     Other and unspecified hyperlipidemia      RESTLESS LEGS SYNDROME      TENOSYNOV HAND/WRIST NEC 6/30/2006     Type II or unspecified type diabetes mellitus without mention of complication, not stated as uncontrolled 2001    Diabetes mellitus/pt is diet controlled with weight loss     Typical atrial flutter (H) 01/17/2007    ablation 6/7/2017     Unspecified essential hypertension        Past Surgical History:   Procedure  Laterality Date     ANGIOPLASTY       C APPENDECTOMY       C CABG, VEIN, FIVE      SVG x 4 and LIMA to LAD     C SOTO W/O FACETEC FORAMOT/[facility]  VRT SEG, LUMBAR       C REMV CATARACT INTRACAP,INSERT LENS      Bilateral     C TOTAL ABDOM HYSTERECTOMY       - fibroids     C VAGOTOMY/PYLOROPLASTY,SELECT  1970     COLONOSCOPY  12/3/2007     COLONOSCOPY  2014    Procedure: COLONOSCOPY;  Colonoscopy;  Surgeon: Zack Stearns MD;  Location:  GI     CYSTOSCOPY  09    [facility]     ENDOSCOPY  2000    Upper GI     HC COLONOSCOPY THRU STOMA, DIAGNOSTIC  10/7/04    poor prep, repeat in 2-3 years     HC COLONOSCOPY W/WO BRUSH/WASH  10/31/07    Repeat-poor quality prep     HC REMOVAL OF OVARY/TUBE(S)      Salpingo-Oophorectomy, Unilateral     HC REVISE MEDIAN N/CARPAL TUNNEL SURG      Carpal tunnel release     HC UGI ENDOSCOPY DIAG W BIOPSY  10/31/07     HC UGI ENDOSCOPY, SIMPLE EXAM  12/3/2007     OPEN REDUCTION INTERNAL FIXATION HIP NAILING Left 7/3/2016    Procedure: OPEN REDUCTION INTERNAL FIXATION HIP NAILING;  Surgeon: Lake Schulz MD;  Location:  OR       Family History   Problem Relation Age of Onset     DIABETES Father      AODM     Neurologic Disorder Father      Parkinson's     Blood Disease Father       from blood clot from leg to lung immediately after hip fracture     DIABETES Paternal Grandmother      adult onset     DIABETES Maternal Grandmother      adult onset     Hypertension No family hx of      CEREBROVASCULAR DISEASE No family hx of        Social History   Substance Use Topics     Smoking status: Former Smoker     Years: 10.00     Quit date: 1969     Smokeless tobacco: Never Used     Alcohol use 0.0 oz/week     0 Standard drinks or equivalent per week      Comment: occasional- 2 drinks per month        Allergies   Allergen Reactions     Ciprofloxacin Nausea and Vomiting     Zofran did not help     Oxycodone Visual Disturbance     Delusions, blackouts       Lisinopril Cough     Penicillins Rash     Sulfa Drugs GI Disturbance     LOSS OF TASTE     Current Facility-Administered Medications   Medication     meropenem (MERREM) 500 mg vial to attach to  mL bag for ADULTS or 25 mL bag for PEDS     levofloxacin (LEVAQUIN) infusion 750 mg     furosemide (LASIX) injection 40 mg     vancomycin (VANCOCIN) 2,000 mg in NaCl 0.9 % 500 mL intermittent infusion     vancomycin place maki - receiving intermittent dosing     Current Outpatient Prescriptions   Medication     ACETAMINOPHEN PO     TRAMADOL HCL PO     cyanocobalamin (VITAMIN B12) 1000 MCG/ML injection     aspirin 81 MG tablet     sodium bicarbonate 650 MG tablet     nitroGLYcerin (NITROSTAT) 0.4 MG sublingual tablet     gabapentin (NEURONTIN) 300 MG capsule     pravastatin (PRAVACHOL) 40 MG tablet     metoprolol (LOPRESSOR) 50 MG tablet     amLODIPine (NORVASC) 5 MG tablet     ferrous sulfate (IRON) 325 (65 FE) MG tablet     calcium acetate (PHOSLO) 667 MG CAPS capsule     pentoxifylline (TRENTAL) 400 MG CR tablet     cholecalciferol 2000 UNITS tablet     calcium carbonate-vitamin D 500-400 MG-UNIT TABS per tablet     Lactobacillus (ACIDOPHILUS) tablet     ORDER FOR DME     Review of Systems   Respiratory: Positive for cough and shortness of breath.    Cardiovascular: Positive for leg swelling.   All other systems reviewed and are negative.      Physical Exam   BP: 133/69  Pulse: 78  Heart Rate: 70  Temp: 97.7  F (36.5  C)  Resp: 12  SpO2: (!) 78 %  Physical Exam   Constitutional: She appears well-developed and well-nourished. She appears distressed.   HENT:   Head: Normocephalic and atraumatic.   Cardiovascular: Normal rate, regular rhythm and normal heart sounds.    Pulmonary/Chest: Accessory muscle usage present. Tachypnea noted. She is in respiratory distress. She has decreased breath sounds in the right middle field, the right lower field, the left middle field and the left lower field. She has no wheezes. She  has no rhonchi. She has rales in the right upper field, the right middle field, the right lower field, the left upper field, the left middle field and the left lower field.   Abdominal: Soft. There is no tenderness. There is no guarding.   Musculoskeletal: Normal range of motion. She exhibits edema.   Neurological: She is alert.   Skin: Skin is warm and dry. She is not diaphoretic. There is erythema. No pallor.        Psychiatric: She has a normal mood and affect.   Nursing note and vitals reviewed.    ED Course     ED Course     Procedures             EKG Interpretation:      Interpreted by Denny Marie  Time reviewed: 0637  Symptoms at time of EKG: Shortness of breath   Rhythm: normal sinus   Rate: normal  Axis: normal  Ectopy: none  Conduction: normal  ST Segments/ T Waves: No ST-T wave changes  Q Waves: none  Comparison to prior: Unchanged from 08/17/2017    Clinical Impression: normal EKG            Critical Care time:  was 45 minutes for this patient excluding procedures.           Labs Ordered and Resulted from Time of ED Arrival Up to the Time of Departure from the ED   CBC WITH PLATELETS DIFFERENTIAL - Abnormal; Notable for the following:        Result Value    WBC 12.9 (*)     RBC Count 2.94 (*)     Hemoglobin 7.4 (*)     Hematocrit 24.8 (*)     MCH 25.2 (*)     MCHC 29.8 (*)     RDW 22.6 (*)     Absolute Neutrophil 11.3 (*)     Absolute Lymphocytes 0.7 (*)     All other components within normal limits   COMPREHENSIVE METABOLIC PANEL - Abnormal; Notable for the following:     Sodium 147 (*)     Chloride 118 (*)     Glucose 189 (*)     Urea Nitrogen 35 (*)     Creatinine 4.42 (*)     GFR Estimate 10 (*)     GFR Estimate If Black 12 (*)     Calcium 6.1 (*)     Albumin 1.8 (*)     Protein Total 5.2 (*)     Alkaline Phosphatase 292 (*)     ALT 80 (*)     All other components within normal limits   BLOOD GAS ARTERIAL - Abnormal; Notable for the following:     pH Arterial 7.27 (*)     Bicarbonate  Arterial 20 (*)     All other components within normal limits   NT PROBNP INPATIENT - Abnormal; Notable for the following:     N-Terminal Pro BNP Inpatient 33482 (*)     All other components within normal limits   TROPONIN I   INR   LACTIC ACID WHOLE BLOOD   PROCALCITONIN   ROUTINE UA WITH MICROSCOPIC   PULSE OXIMETRY NURSING   CARDIAC CONTINUOUS MONITORING   PERIPHERAL IV CATHETER   STRICT INTAKE AND OUTPUT   PULSE OXIMETRY NURSING   CARDIAC CONTINUOUS MONITORING   MEASURE URINE OUTPUT   PATIENT CARE ORDER   ABO/RH TYPE AND SCREEN   BLOOD CULTURE   BLOOD CULTURE   URINE CULTURE AEROBIC BACTERIAL        Xr Chest Port 1 View    Result Date: 8/26/2017  Bilateral perihilar opacities and bilateral effusions suspicious for pulmonary edema or possibly infection.    Assessments & Plan (with Medical Decision Making)   I was physically present and have reviewed and verified the accuracy of this note documented by (myself).     Disclaimer: This note consists of symbols derived from keyboarding, dictation, and/or voice recognition software. As a result, there may be errors in the script that have gone undetected.  Please consider this when interpreting information found in the chart.These sections of the chart were reviewed for accuracy to the best of my knowledge and ability.    Patient was clinically assessed and consented to treatment. After assessment, medical decision making and workup were discussed with the patient. The patient was agreeable to plan for testing, workup, and treatment.  Ktahryn Banks is a 77 year old female who presents today for shortness of breath.  Patient on auscultation lately with pulmonary edema given the crackles throughout.  Patient with history of end-stage renal disease and likely fluid overload as well.  EKG was obtained that showed no peaked T waves and I do not suspect hyperkalemia.  Additional EKG was normal sinus rhythm however patient has had chronic atrial fibrillation in the past  but recently had ablation in June of this year and has not had any recurrences.  Labs were obtained and chest x-ray was obtained.  Chest x-ray showed bilateral pulmonary edema but possible infection.  Patient has been in hospital setting and health care setting and this could be healthcare acquired pneumonia however he feels more likely pulmonary edema though the computer underlying infection.  Patient initially arrived in the ER with CPAP from EMS at 10 L oxygen and satting 78%.  On transfer to our BiPAP at 13/8 her oxygen saturations improved 100% on FiO2 100%.  Patient improved dramatically and was more alert as well as respirations decreased dramatically from tachypnea.  She is breathing much improved and the crackles reduced to the mid and lower lobes bilaterally.  Patient was feeling much better and after discussion we will start with a dose of Lasix for the pulmonary edema.  Additionally a stated broad-spectrum antibiotics were started for suspicion of infiltrate.  White blood cell count was also noted to be elevated.  BNP returned elevated as previous consistent with her history of cardiac disease.  ABG did show acidosis on arrival which would be consistent with her respiratory component acidosis and her breathing status when she came in.  After stabilization on noninvasive mechanical ventilation patient was feeling much better and plan will be to continue with diuresis and patient will likely need admission medicine for possible further diuresis or dialysis.  Patient was given another dose of Lasix and discussed with medicine.  Medicine will admit the patient to stepdown unit for close monitoring and possible dialysis if needed.  Additionally type and screen was ordered and added on due to patient's chronic anemia and possibility that she may need transfusion given her history cardiac disease and chronic anemia.    I have reviewed the nursing notes.    I have reviewed the findings, diagnosis, plan and need  for follow up with the patient.    New Prescriptions    No medications on file       Final diagnoses:   Respiratory distress   Acidosis   Acute pulmonary edema (H)       8/26/2017   [facility], [place], EMERGENCY DEPARTMENT     Denny Marie MD  08/26/17 [id number]

## 2017-08-26 NOTE — H&P
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 Per patient, she was not discharged on lasix and has been doing fine until 1-2 days ago when she started to feel more SOB and coughing more. Cough out dry sputum without blood. Also noticed not urinating as much as prior. Her SOB is on every activities and could not lie down flat. She also endorsed swelling of her UE and LE. Upon asking about dysuria, she denied having burning urination and also stated that she did not has these symptoms in prior infection too. However, in prior notes, it was stated that she did have dysuria.     Denies chest pain. Breathing much more comfortably when on BiPAP mask asking to be out from the mask and talk to her  on cell phone. Denies fever/chills at TCU.     ED course : Lasix 40 mg iv x 2, UOP 1L by the time at the floor    Recent admission(s)   8/12-18: MARTÍN on CKD, sepsis from UTI and cellulitis  7/6-11: MARTÍN on CKD, HFpEF  6/21-25: MARTÍN on CKD  6/12-16: MARTÍN, Aflutter  5/31-6/6: Anarsarca, MARTÍN  4/28-5/2: MARTÍN and UTI    ROS (+) Cough, SOB  ROS (-) HA, fever, chills, night sweats, lumps, bumps, rashes   Sore throat, CP   Abdominal pain, Nausea, vomitting, diarrhea, constipation   Remainder of 12pt-ROS negative except mentioned above         Past Medical History     Patient Active Problem List   Diagnosis     Restless leg syndrome     Sleep apnea     Lipoma of other skin and subcutaneous  tissue     ESOPHAGEAL REFLUX     Postsurgical aortocoronary bypass status     Iron deficiency anemia     History of peptic ulcer disease     Edema     Kidney stone     Hyperlipidemia LDL goal <100     Advanced directives, counseling/discussion     CAD - NSTEMI, CABG x 5 2007, angio in 2013 with patent grafts other than occluded graft to PL     Hx of non-ST elevation myocardial infarction (NSTEMI)     Health Care Home     Lymphedema     Anemia of chronic renal failure, stage 4 (severe) (H)     Renal failure     Osteoarthritis of hip     Non morbid obesity, unspecified obesity type     Type 2 diabetes mellitus with other circulatory complications (H)     Long-term (current) use of anticoagulants [Z79.01]     Acute renal failure with tubular necrosis (H)     Essential hypertension with goal blood pressure less than 140/90     Atrial fibrillation with rapid ventricular response (H)     Rash - redness medial thighs     Metabolic acidosis, normal anion gap (NAG)     Pulmonary hypertension (H)     Chronic heart failure (H) with preserved EF     Atrial flutter (H) - present 6/12     Generalized edema - anasarca     Hypertensive heart and chronic kidney disease with heart failure and stage 1 through stage 4 chronic kidney disease, or unspecified chronic kidney disease (H)     Supratherapeutic INR     Proteinuria 2.1 g/g Cr     Bradycardia     Acute on chronic renal failure (H)     Memory loss     Chronic atrial fibrillation (H) on 6/12-6/13     Blood in stool     Microscopic hematuria     Acute kidney injury (nontraumatic) (H)     CHF (congestive heart failure) (H)     Anemia     Anemia, iron deficiency     Acidosis     Pulmonary edema           Past Surgical History     Past Surgical History:   Procedure Laterality Date     ANGIOPLASTY       C APPENDECTOMY       C CABG, VEIN, FIVE  12/06    SVG x 4 and LIMA to LAD     C SOTO W/O FACETEC FORAMOT/DSKC 1/2 VRT SEG, LUMBAR  1968     C REMV CATARACT INTRACAP,INSERT LENS       Bilateral     C TOTAL ABDOM HYSTERECTOMY  1995     - fibroids     C VAGOTOMY/PYLOROPLASTY,SELECT  1970     COLONOSCOPY  12/3/2007     COLONOSCOPY  1/17/2014    Procedure: COLONOSCOPY;  Colonoscopy;  Surgeon: Zack Stearns MD;  Location:  GI     CYSTOSCOPY  11/25/09    [facility]     ENDOSCOPY  03/21/2000    Upper GI     HC COLONOSCOPY THRU STOMA, DIAGNOSTIC  10/7/04    poor prep, repeat in 2-3 years     HC COLONOSCOPY W/WO BRUSH/WASH  10/31/07    Repeat-poor quality prep     HC REMOVAL OF OVARY/TUBE(S)      Salpingo-Oophorectomy, Unilateral     HC REVISE MEDIAN N/CARPAL TUNNEL SURG      Carpal tunnel release     HC UGI ENDOSCOPY DIAG W BIOPSY  10/31/07     HC UGI ENDOSCOPY, SIMPLE EXAM  12/3/2007     OPEN REDUCTION INTERNAL FIXATION HIP NAILING Left 7/3/2016    Procedure: OPEN REDUCTION INTERNAL FIXATION HIP NAILING;  Surgeon: Lake Schulz MD;  Location:  OR            Medications     No current facility-administered medications on file prior to encounter.   Current Outpatient Prescriptions on File Prior to Encounter:  ACETAMINOPHEN PO Take 650 mg by mouth every 4 hours as needed for pain For pain 1-5 out of 10   TRAMADOL HCL PO Take 50 mg by mouth every 6 hours as needed for moderate to severe pain Pain of 6-10 out of 10   cyanocobalamin (VITAMIN B12) 1000 MCG/ML injection Inject 1 mL into the muscle every 30 days   aspirin 81 MG tablet Take 1 tablet (81 mg) by mouth daily   sodium bicarbonate 650 MG tablet Take 2 tablets (1,300 mg) by mouth 3 times daily   nitroGLYcerin (NITROSTAT) 0.4 MG sublingual tablet Place 1 tablet (0.4 mg) under the tongue every 5 minutes as needed for chest pain   gabapentin (NEURONTIN) 300 MG capsule Take 1 capsule (300 mg) by mouth At Bedtime   pravastatin (PRAVACHOL) 40 MG tablet Take 1 tablet (40 mg) by mouth daily   metoprolol (LOPRESSOR) 50 MG tablet Take 0.5 tablets (25 mg) by mouth 2 times daily   amLODIPine (NORVASC) 5 MG tablet Take 2 tablets (10 mg) by mouth daily    ferrous sulfate (IRON) 325 (65 FE) MG tablet Take 1 tablet (325 mg) by mouth daily (with breakfast)   calcium acetate (PHOSLO) 667 MG CAPS capsule Take 1 capsule (667 mg) by mouth 3 times daily (with meals)   pentoxifylline (TRENTAL) 400 MG CR tablet Take 1 tablet (400 mg) by mouth daily   cholecalciferol 2000 UNITS tablet Take 2,000 Units by mouth daily   calcium carbonate-vitamin D 500-400 MG-UNIT TABS per tablet Take 1 tablet by mouth daily   Lactobacillus (ACIDOPHILUS) tablet Take 1 tablet by mouth daily   ORDER FOR DME Equipment being ordered: cpap machine, mask, humidifier, tubing and filters            Allergies     Allergies   Allergen Reactions     Ciprofloxacin Nausea and Vomiting     Zofran did not help     Oxycodone Visual Disturbance     Delusions, blackouts      Lisinopril Cough     Sulfa Drugs GI Disturbance     LOSS OF TASTE            Social History     Social History     Social History     Marital status:      Spouse name: Urbano Banks     Number of children: 2     Years of education: 13     Occupational History     Ministry coordinator      [employer]     Social History Main Topics     Smoking status: Former Smoker     Years: 10.00     Quit date: 1969     Smokeless tobacco: Never Used     Alcohol use 0.0 oz/week     0 Standard drinks or equivalent per week      Comment: occasional- 2 drinks per month     Drug use: No     Sexual activity: Yes     Birth control/ protection: Surgical     Other Topics Concern     Parent/Sibling W/ Cabg, Mi Or Angioplasty Before 65f 55m? No     Social History Narrative            Family History     Family History   Problem Relation Age of Onset     DIABETES Father      AODM     Neurologic Disorder Father      Parkinson's     Blood Disease Father       from blood clot from leg to lung immediately after hip fracture     DIABETES Paternal Grandmother      adult onset     DIABETES Maternal Grandmother      adult onset      Hypertension No family hx of      CEREBROVASCULAR DISEASE No family hx of           Vitals and Exam     Physical exam:  /76 (BP Location: Right arm)  Pulse 78  Temp 97.4  F (36.3  C) (Axillary)  Resp 20  SpO2 96%  Wt Readings from Last 2 Encounters:   08/23/17 83.3 kg (183 lb 9.6 oz)   08/22/17 85.3 kg (188 lb 1.6 oz)     General: AAOx3, no clubbing/cyanosis, 1+ edema at legs, more edema on arms,   HEENT:  PERRL, EOMI, MMM with out pharyngeal erythema.   Cardiac: RRR. No m/r/g. Normal S1, S2. DP2+ bilaterally  Pulm: occasional wheezes,  Bilateral inspiratory crackles.  Abd: +BS, soft, non distended, non tender. No hepatosplenomegaly.  Skin: No rash  MSK: No deformities, no joint swelling  Neuro: CN grossly intact, no focal deficits  Psych: normal mood, full affect    In dwelling lines at admission: Cee's upon admission         Labs   CBC  Recent Labs  Lab 08/26/17  0637 08/22/17  1410   WBC 12.9* 9.3   RBC 2.94* 3.10*   HGB 7.4* 7.8*   HCT 24.8* 25.8*   MCV 84 83   MCH 25.2* 25.2*   MCHC 29.8* 30.2*   RDW 22.6* 22.3*    167       BMP  Recent Labs  Lab 08/26/17  0637 08/22/17  1410   * 147*   POTASSIUM 4.8 4.6   CHLORIDE 118* 118*   CO2 22 20   ANIONGAP 8 9   * 257*   BUN 35* 30   CR 4.42* 4.48*   GFRESTIMATED 10* 10*   GFRESTBLACK 12* 12*   MALICK 6.1* 6.3*   PHOS  --  4.8*        INR  Recent Labs  Lab 08/26/17  0637   INR 1.11       Liver panel  Recent Labs  Lab 08/26/17  0637 08/22/17  1410   PROTTOTAL 5.2*  --    ALBUMIN 1.8* 2.0*   BILITOTAL 0.3  --    ALKPHOS 292*  --    AST 34  --    ALT 80*  --        Urinalysis  Recent Labs   Lab Test  08/22/17   1418   07/02/16   0224   COLOR  Light Yellow   < >  Yellow   APPEARANCE  Slightly Cloudy   < >  Clear   URINEGLC  150*   < >  Negative   URINEBILI  Negative   < >  Negative   URINEKETONE  Negative   < >  Negative   SG  1.007   < >  1.020   UBLD  Trace*   < >  Trace*   URINEPH  5.0   < >  6.0   PROTEIN  10*   < >  Negative   UROBILINOGEN    --    --   0.2   NITRITE  Negative   < >  Negative   LEUKEST  Large*   < >  Negative   RBCU  5-10*   < >  2-5*   WBCU  25-50*   < >  O - 2    < > = values in this interval not displayed.       Cultures  Last 6 Culture results with specimen source  Culture Micro   Date Value Ref Range Status   08/13/2017 (A)  Final    >100,000 colonies/mL  Escherichia coli  Susceptibility testing done on previous specimen     08/13/2017 (A)  Final    50,000 to 100,000 colonies/mL  Enterococcus faecalis     08/13/2017   Final    Plus  10,000 to 50,000 colonies/mL  mixed urogenital tyler  Susceptibility testing not routinely done     08/12/2017 No growth  Final   08/12/2017 No growth  Final   08/12/2017 Moderate growth  Pseudomonas aeruginosa   (A)  Final   08/12/2017 Moderate growth  Serratia marcescens   (A)  Final   08/12/2017 Moderate growth  Enterococcus faecalis   (A)  Final   08/12/2017 (A)  Final    Heavy growth  Coagulase negative Staphylococcus  Susceptibility testing not routinely done     08/12/2017 Light growth  Klebsiella oxytoca   (A)  Final   08/12/2017 Plus  Light growth  Normal skin tyler    Final   08/12/2017 (A)  Final    >100,000 colonies/mL Escherichia coli  50,000 to 100,000 colonies/mL Citrobacter koseri  Plus <10,000 colonies/mL mixed urogenital tyler Susceptibility testing not   routinely done      Specimen Description   Date Value Ref Range Status   08/15/2017 Feces  Final   08/13/2017 Midstream Urine  Final   08/12/2017 Blood Unspecified Site  Final   08/12/2017 Blood Unspecified Site  Final   08/12/2017 Leg Wound  Final   08/12/2017 Leg Wound  Final   08/12/2017 Midstream Urine  Final        Last check of C difficile  C Diff Toxin B PCR   Date Value Ref Range Status   08/15/2017 Negative NEG^Negative Final     Comment:     Negative: Clostridium difficile target DNA sequences NOT detected, presumed   negative for Clostridium difficile toxin B or the number of bacteria present   may be below the limit of  detection for the test.  FDA approved assay performed using "[employer] GeneXpert real-time PCR.  A negative result does not exclude actual disease due to Clostridium difficile   and may be due to improper collection, handling and storage of the specimen   or the number of organisms in the specimen is below the detection limit of the   assay.         Imaging/procedure results:  Recent Results (from the past 48 hour(s))   XR Chest Port 1 View    Narrative    XR CHEST PORT 1 VW 8/26/2017 7:06 AM    History: Chest pain    Comparison: 8/12/2017    Findings: Single portable AP view of the chest. The heart size is  within normal limits. Median sternotomy wires are stable. The trachea  is slightly shifted to the right. Bilateral perihilar and basilar  airspace opacities, new since prior study. Bilateral left greater than  right pleural effusions and associated atelectasis. New left  retrocardiac opacity. No pneumothorax.      Impression    Impression:   1. New bilateral perihilar and basilar opacities representing  pulmonary edema or infection.  2. Bilateral pleural effusions with associated atelectasis or  consolidations.    I have personally reviewed the examination and initial interpretation  and I agree with the findings.    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 Multiple provocation from infection (cellulitis/ PNA/UTI) and was  in the ICU before for respiratory distress. Cr on this admission was 4.4 similar to last discharge Cr, however unclear if this could be MARTÍN from a recovered Cr. Of note, patient was not discharged with diuretics. Less suspicous of PNA given no fever, no productive sputum, negative procal and diffuse B-line on bedside U/S  - Stop all antibiotics, continue to monitor for fever  - s/p lasix 40 mg x 2, continue with bumex 2 mg IV tid, with good UOP  - FeUrea sent to teased out ATN vs cardiorenal  - BiPAP and wean as able to high flow. Could eat if off from BiPAP  - Daily I/O, weight, BMP, avoid nephrotoxic agents  - Cee's in because of urinary retention after getting diuresis    # CKD-BMD, secondary hyperPTH, VitaminD deficient  - continue phoslo, vitaminD, calcium carbonate  - last ferritin 242, continue ferrous supplement    # HTN  Needing diuresis; thus will decrease dosing  - hold amlodipine  - metoprolol 25 -> 12.5 mg bid    # DM2  Diet control, likely because of deteriorating renal function. A1c 6/22/2017 = 7.3  - Low SSI, hypoglycemia protocol  - POCT q4 while NPO    # Chronic venous ulcer   W/u in last admission, not appear to be infected  - WOC nurse consult  - Vascular surgery follow-up for outpatient TCO2 study, Dermatology f/u in 4-6 weeks  - Continue pentoxifylline      Chronic problems  # Vertical Diplopia: no change currently  # Afib s/p ablation 6/2017 : no recurrent, cardiology determined to be off from warfarin  # CAD s/p CABG : continue ASA, pracastatin  # Chronic diarrhea : last C.diff check 8/15, will CTM  # Gluteal cyst : stable, no I&D per last surgery    FEN: NPO until persistently off from BiPAP  DVT Prophylaxis:  SCD  GI Prophylaxis: not indicated  Disposition: PT/OT, likely same TCU  Code Status: FULL    Seen and discussed with Dr. Tobin who agrees with above assessment and plan.  Cameron Burrell MD PGY1 Internal Medicine

## 2017-08-26 NOTE — IP AVS SNAPSHOT
MRN:[id number]                      After Visit Summary   8/26/2017    Kathryn Banks    MRN: [id number]           Thank you!     Thank you for choosing [facility] for your care. Our goal is always to provide you with excellent care. Hearing back from our patients is one way we can continue to improve our services. Please take a few minutes to complete the written survey that you may receive in the mail after you visit with us. Thank you!        Patient Information     Date Of Birth          1940        Designated Caregiver       Most Recent Value    Caregiver    Will someone help with your care after discharge? yes    Name of designated caregiver Christian     Phone number of caregiver [phone]    Caregiver address [address] [place] [address]      About your hospital stay     You were admitted on:  August 26, 2017 You last received care in the:  [address] [facility]    You were discharged on:  September 1, 2017        Reason for your hospital stay       You were hospitalized for difficulty breathing due to fluid in your lungs.  You were given medications to make you urinate and your lung fluid/breathing improved.                  Who to Call     For medical emergencies, please call 911.  For non-urgent questions about your medical care, please call your primary care provider or clinic, [phone]          Attending Provider     Provider Specialty    [place], Denny Elizabeth MD Emergency Medicine    Mustapha Palmer MD Emergency Medicine    Stephanie, Erica Erickson MD Internal Medicine    Mahad, Gold So MD Internal Medicine    Rajan Epperson MD Pediatrics    El Zhao MD Internal Medicine       Primary Care Provider Office Phone # Fax #    Dheeraj Jj [phone]      After Care Instructions     Activity - Up ad beata           Advance Diet as Tolerated       Follow this diet upon discharge: Orders Placed This Encounter      Room Service       Renal Diet (non-dialysis)            Daily weights       Call Provider for weight gain of more than 2 pounds per day or 5 pounds per week.            General info for SNF       Length of Stay Estimate: Short Term Care: Estimated # of Days <30  Condition at Discharge: Improving  Level of care:skilled   Rehabilitation Potential: Good  Admission H&P remains valid and up-to-date: Yes  Recent Chemotherapy: N/A  Use Nursing Home Standing Orders: Yes            Intake and output       Every shift            Mantoux instructions       Give two-step Mantoux (PPD) Per Facility Policy Yes                  Follow-up Appointments     Follow Up and recommended labs and tests       Follow up with C[facility] clinic in 2 weeks.  The following labs/tests are recommended: BMP.                  Your next 10 appointments already scheduled     Sep 06, 2017  7:30 AM CDT   Lab with  LAB   [facility])    [address] [place] [address] [id number] [phone]            Sep 06, 2017  8:00 AM CDT   (Arrive by 7:45 AM)   CORE NEW with Destiny Berrios NP   [facility] ([facility])    [address] [place] [address] [id number] [phone]            Sep 15, 2017  1:30 PM CDT   Office Visit with [facility]aj Jj MD   [facility]    [address] [facility] [address] [facility] [id number] [phone]           Bring a current list of meds and any records pertaining to this visit. For Physicals, please bring immunization records and any forms needing to be filled out. Please arrive 10 minutes early to complete paperwork.            Sep 19, 2017 11:30 AM CDT   LAB with LAB FIRST FLOOR [facility] ([facility])    [address] [address] [place] [id number] [phone]           Patient must bring picture ID. Patient should be prepared to give a urine specimen   Please do not eat 10-12 hours before your appointment if you are coming in fasting for labs on lipids, cholesterol, or glucose (sugar). Pregnant women should follow their Care Team instructions. Water with medications is okay. Do not drink coffee or other fluids. If you have concerns about taking  your medications, please ask at office or if scheduling via [employer], send a message by clicking on Secure Messaging, Message Your Care Team.            Sep 19, 2017 12:00 PM CDT   Return Visit with Vibha Barfield MD   [facility]    [id number] [place] [id number] [phone]              Additional Services     Medication Therapy Management Referral       Reason for referral:  on more than 5 medications and managing chronic disease and on more than 10 medications and hospitalized or in the ED in the past 6 months     This service is designed to help you get the most from your medications.  A specially trained pharmacist will work closely with you and your doctors  to solve any problems related to your medications and to help you get the   best results from taking them.      The Medication Therapy Management staff will call you to schedule an appointment.                  Future tests that were ordered for you     INR                 Warfarin Instruction     You have started taking a medicine called warfarin. This is a blood-thinning medicine (anticoagulant). It helps prevent and treat blood clots.      Before leaving the hospital, make sure you know how much to take and how long to take it.      You will need regular blood tests to make sure your blood is clotting safely. It is very important to see your doctor for regular blood tests.    Talk to your doctor before taking any new medicine (this includes over-the-counter drugs and herbal products). Many medicines can interact with warfarin. This may cause more bleeding or too much clotting.     Eating  "a lot of vitamin K--found in green, leafy vegetables--can change the way warfarin works in your body. Do NOT avoid these foods. Instead, try to eat the same amount each day.     Bleeding is the most common side-effect of warfarin. You may notice bleeding gums, a bloody nose, bruises and bleeding longer when you cut yourself. See a doctor at once if:   o You cough up blood  o You find blood in your stool (poop)  o You have a deep cut, or a cut that bleeds longer than 10 minutes   o You have a bad cut, hard fall, accident or hit your head (go to urgent care or the emergency room).    For women who can get pregnant: This medicine can harm an unborn baby. Be very careful not to get pregnant while taking this medicine. If you think you might be pregnant, call your doctor right away.    For more information, read \"Guide to Warfarin Therapy,  the booklet you received in the hospital.        General Recommendations To Control Heart Failure When You Get Home     Instructions To Patients and Families: Please read and check off each of these important instructions as you do them when you get home.           Weight and symptoms      ___ Put a scale in your bathroom  ___ Post a weight chart or calendar next to the scale  ___Weigh yourself every day as soon as you you get up in the morning. You should only be wearing your pajamas. Write your weight on the chart/calendar.  ___ Bring your weight chart/calendar with you to all appointments    ___Call your doctor if you gain 2 pounds in 1 day or 5 pounds in 1 week from your \"dry\" weight (baseline weight). Also call your doctor if you have shortness of breath that gets worse over time, leg swelling or fatigue.         Medicines and diet     ___ Make sure to take your medicines as prescribed.    ___Bring a current list of your medicines and all of your medicine bottles with you to all appointments.    ___ Limit fluids if you still have swelling or shortness of breath, or if your doctor " tells you to do so.  ___ Eat less than 2000 mg of sodium (salt) every day. Read food labels, and do not add salt to meals.   ___ Heart healthy diet with low fat and low cholesterol          Activity and suggested lifestyle changes    ___ Stay active. Talk to your doctor about an exercise program that is safe for your heart.    ___ Stop smoking. Reduce alcohol use.      ___ Lose weight if you are overweight. Extra weight puts a lot of stress on the heart.          Control for Leg Swelling   ___ Keep your legs elevated (raised) as needed for swelling. If swelling is uncomfortable or elevation doesn t help, ask your doctor about using ACE wrap or Jobst stockings.          Follow-up appointments   ___ Make a [facility] appointment with a basic metabolic panel lab draw 3 to 5 days after you leave the hospital. Call one of the following locations:   [facility]  [phone],  [facility] [phone],  [facility]  [phone].     ___ Make sure to take your medications as prescribed and bring an accurate list of your medications and your weight chart/calendar to your follow up appointment at the [facility] Clinic for continued education and adjustments          What is the C[facility] clinic?    The C.O.R.E (Cardiomyopathy, Optimization, Rehabilitation, Education) Clinic is a heart failure specialty clinic within the [facility]. At C.O.R.E., you will work with nurse practitioners to carefully adjust medicines, get education and learn who and when to call if symptoms appear. [facility].O.R.[facility] nurses specialize in helping you:    better understand your disease.    slow the progress of your disease.    improve the length and quality of your life.    detect future heart problems before they become life threatening.    avoid hospital stays.            Pending Results     No orders found from 8/24/2017 to 8/27/2017.  "           Statement of Approval     Ordered          17 1150  I have reviewed and agree with all the recommendations and orders detailed in this document.  EFFECTIVE NOW     Approved and electronically signed by:  Mike Tadeo MD             Admission Information     Date & Time Provider Department Dept. Phone    2017 El Zhao MD [address] [facility] [phone]      Your Vitals Were     Blood Pressure Pulse Temperature Respirations Weight Pulse Oximetry    106/50 69 97.7  F (36.5  C) (Oral) 16 73.2 kg (161 lb 6.4 oz) 95%    BMI (Body Mass Index)                   27.7 kg/m2           MyChart Information     [employer] lets you send messages to your doctor, view your test results, renew your prescriptions, schedule appointments and more. To sign up, go to [email or web address] . Click on \"Log in\" on the left side of the screen, which will take you to the Welcome page. Then click on \"Sign up Now\" on the right side of the page.     You will be asked to enter the access code listed below, as well as some personal information. Please follow the directions to create your username and password.     Your access code is: [email or web address]  Expires: 2017  4:04 PM     Your access code will  in 90 days. If you need help or a new code, please call your [facility] clinic or [phone].        Care EveryWhere ID     This is your Care EveryWhere ID. This could be used by other organizations to access your [facility] medical records  [id number]        Equal Access to Services     [facility] : Hadii pascual ku hadasho Soomaali, waaxda luqadaha, qaybta kaalmada adeegyada, tay murillo. So [facility] [phone].    ATENCIÓN: Si habla español, tiene a medrano disposición servicios gratuitos de asistencia lingüística. Llame al [phone].    We comply with applicable federal civil rights laws and [place] laws. We do not discriminate on the basis of race, color, national origin, " age, disability sex, sexual orientation or gender identity.               Review of your medicines      START taking        Dose / Directions    calcitRIOL 0.5 MCG capsule   Commonly known as:  ROCALTROL   Used for:  Constitutional chronic hypocalcemia        Dose:  0.5 mcg   Take 1 capsule (0.5 mcg) by mouth daily   Refills:  0       fluticasone 50 MCG/ACT spray   Commonly known as:  FLONASE   Used for:  Nasal congestion        Dose:  1 spray   Spray 1 spray into both nostrils daily   Quantity:  1 Bottle   Refills:  0       guaiFENesin-dextromethorphan 100-10 MG/5ML syrup   Commonly known as:  ROBITUSSIN DM   Used for:  Cough        Dose:  5 mL   Take 5 mLs by mouth every 8 hours as needed for cough or congestion   Quantity:  560 mL   Refills:  0       warfarin 2.5 MG tablet   Commonly known as:  COUMADIN   Used for:  Atrial fibrillation with rapid ventricular response (H)        Dose:  2.5 mg   Take 1 tablet (2.5 mg) by mouth daily   Quantity:  30 tablet   Refills:  0         CONTINUE these medicines which may have CHANGED, or have new prescriptions. If we are uncertain of the size of tablets/capsules you have at home, strength may be listed as something that might have changed.        Dose / Directions    torsemide 5 MG tablet   Commonly known as:  DEMADEX   This may have changed:  when to take this   Used for:  Chronic diastolic congestive heart failure (H)        Dose:  20 mg   Take 4 tablets (20 mg) by mouth 2 times daily   Refills:  0         CONTINUE these medicines which have NOT CHANGED        Dose / Directions    ACETAMINOPHEN PO        Dose:  650 mg   Take 650 mg by mouth every 4 hours as needed for pain For pain 1-5 out of 10   Refills:  0       acidophilus tablet   Used for:  Urinary tract infection without hematuria, site unspecified, Cellulitis of lower extremity, unspecified laterality        Dose:  1 tablet   Take 1 tablet by mouth daily   Refills:  0       amLODIPine 5 MG tablet   Commonly known  as:  NORVASC   Used for:  Benign essential hypertension        Dose:  10 mg   Take 2 tablets (10 mg) by mouth daily   Quantity:  30 tablet   Refills:  3       aspirin 81 MG tablet   Used for:  Coronary artery disease involving native coronary artery of native heart without angina pectoris        Dose:  81 mg   Take 1 tablet (81 mg) by mouth daily   Quantity:  30 tablet   Refills:  3       calcium acetate 667 MG Caps capsule   Commonly known as:  PHOSLO   Used for:  Chronic kidney disease, unspecified CKD stage        Dose:  667 mg   Take 1 capsule (667 mg) by mouth 3 times daily (with meals)   Quantity:  180 capsule   Refills:  0       Calcium Carb-Cholecalciferol 500-400 MG-UNIT Tabs        Dose:  1 tablet   Take 1 tablet by mouth 2 times daily   Refills:  0       CEPHALEXIN PO   Indication:  Urinary Tract Infection        Dose:  500 mg   Take 500 mg by mouth 3 times daily X 10 days for UTI, started 8/25   Refills:  0       cyanocobalamin 1000 MCG/ML injection   Commonly known as:  VITAMIN B12        Dose:  1 mL   Inject 1 mL into the muscle every 30 days   Refills:  0       ferrous sulfate 325 (65 FE) MG tablet   Commonly known as:  IRON   Used for:  Iron deficiency anemia secondary to inadequate dietary iron intake        Dose:  325 mg   Take 1 tablet (325 mg) by mouth daily (with breakfast)   Quantity:  100 tablet   Refills:  0       gabapentin 300 MG capsule   Commonly known as:  NEURONTIN   Used for:  Diabetic polyneuropathy associated with type 2 diabetes mellitus (H)        Dose:  300 mg   Take 1 capsule (300 mg) by mouth At Bedtime   Quantity:  90 capsule   Refills:  0       ipratropium - albuterol 0.5 mg/2.5 mg/3 mL 0.5-2.5 (3) MG/3ML neb solution   Commonly known as:  DUONEB        Dose:  1 vial   Take 1 vial by nebulization 3 times daily   Refills:  0       metoprolol 50 MG tablet   Commonly known as:  LOPRESSOR   Used for:  Atrial fibrillation with rapid ventricular response (H)        Dose:  25 mg    Take 0.5 tablets (25 mg) by mouth 2 times daily   Quantity:  180 tablet   Refills:  3       nitroGLYcerin 0.4 MG sublingual tablet   Commonly known as:  NITROSTAT   Used for:  Hx of non-ST elevation myocardial infarction (NSTEMI), Atherosclerosis of native coronary artery of native heart without angina pectoris, Postsurgical aortocoronary bypass status        Dose:  0.4 mg   Place 1 tablet (0.4 mg) under the tongue every 5 minutes as needed for chest pain   Quantity:  25 tablet   Refills:  0       order for DME   Used for:  ANABEL (obstructive sleep apnea)        Equipment being ordered: cpap machine, mask, humidifier, tubing and filters   Quantity:  1 Device   Refills:  0       pentoxifylline 400 MG CR tablet   Commonly known as:  TRENtal   Used for:  Venous stasis ulcer (H)        Dose:  400 mg   Take 1 tablet (400 mg) by mouth daily   Refills:  0       pravastatin 40 MG tablet   Commonly known as:  PRAVACHOL   Used for:  Hx of non-ST elevation myocardial infarction (NSTEMI), Atherosclerosis of native coronary artery of native heart without angina pectoris, Type 2 diabetes mellitus with other circulatory complications (H)        Dose:  40 mg   Take 1 tablet (40 mg) by mouth daily   Quantity:  90 tablet   Refills:  3       sodium bicarbonate 650 MG tablet   Used for:  Chronic kidney disease, unspecified CKD stage        Dose:  1300 mg   Take 2 tablets (1,300 mg) by mouth 3 times daily   Refills:  0       TRAMADOL HCL PO        Dose:  50 mg   Take 50 mg by mouth every 6 hours as needed for moderate to severe pain Pain of 6-10 out of 10   Refills:  0         STOP taking     cholecalciferol 2000 UNITS tablet                Where to get your medicines      These medications were sent to [facility] - [place], [place] - [address]  [address] [facility] [address]     Phone:  [phone]     warfarin 2.5 MG tablet         Some of these will need a paper prescription and others can be  bought over the counter. Ask your nurse if you have questions.     You don't need a prescription for these medications     calcitRIOL 0.5 MCG capsule    fluticasone 50 MCG/ACT spray    guaiFENesin-dextromethorphan 100-10 MG/5ML syrup    torsemide 5 MG tablet                Protect others around you: Learn how to safely use, store and throw away your medicines at www.disposemymeds.org.             Medication List: This is a list of all your medications and when to take them. Check marks below indicate your daily home schedule. Keep this list as a reference.      Medications           Morning Afternoon Evening Bedtime As Needed    ACETAMINOPHEN PO   Take 650 mg by mouth every 4 hours as needed for pain For pain 1-5 out of 10                                acidophilus tablet   Take 1 tablet by mouth daily                                amLODIPine 5 MG tablet   Commonly known as:  NORVASC   Take 2 tablets (10 mg) by mouth daily   Last time this was given:  5 mg on 9/1/2017  9:33 AM                                aspirin 81 MG tablet   Take 1 tablet (81 mg) by mouth daily                                calcitRIOL 0.5 MCG capsule   Commonly known as:  ROCALTROL   Take 1 capsule (0.5 mcg) by mouth daily   Last time this was given:  0.5 mcg on 9/1/2017  9:33 AM                                calcium acetate 667 MG Caps capsule   Commonly known as:  PHOSLO   Take 1 capsule (667 mg) by mouth 3 times daily (with meals)   Last time this was given:  667 mg on 9/1/2017 12:57 PM                                Calcium Carb-Cholecalciferol 500-400 MG-UNIT Tabs   Take 1 tablet by mouth 2 times daily                                CEPHALEXIN PO   Take 500 mg by mouth 3 times daily X 10 days for UTI, started 8/25                                cyanocobalamin 1000 MCG/ML injection   Commonly known as:  VITAMIN B12   Inject 1 mL into the muscle every 30 days                                ferrous sulfate 325 (65 FE) MG tablet   Commonly  known as:  IRON   Take 1 tablet (325 mg) by mouth daily (with breakfast)   Last time this was given:  325 mg on 9/1/2017  9:33 AM                                fluticasone 50 MCG/ACT spray   Commonly known as:  FLONASE   Spray 1 spray into both nostrils daily   Last time this was given:  1 spray on 9/1/2017 12:56 PM                                gabapentin 300 MG capsule   Commonly known as:  NEURONTIN   Take 1 capsule (300 mg) by mouth At Bedtime                                guaiFENesin-dextromethorphan 100-10 MG/5ML syrup   Commonly known as:  ROBITUSSIN DM   Take 5 mLs by mouth every 8 hours as needed for cough or congestion   Last time this was given:  5 mLs on 8/31/2017  8:17 AM                                ipratropium - albuterol 0.5 mg/2.5 mg/3 mL 0.5-2.5 (3) MG/3ML neb solution   Commonly known as:  DUONEB   Take 1 vial by nebulization 3 times daily   Last time this was given:  3 mLs on 8/31/2017  1:47 PM                                metoprolol 50 MG tablet   Commonly known as:  LOPRESSOR   Take 0.5 tablets (25 mg) by mouth 2 times daily   Last time this was given:  25 mg on 9/1/2017  9:33 AM                                nitroGLYcerin 0.4 MG sublingual tablet   Commonly known as:  NITROSTAT   Place 1 tablet (0.4 mg) under the tongue every 5 minutes as needed for chest pain                                order for DME   Equipment being ordered: cpap machine, mask, humidifier, tubing and filters                                pentoxifylline 400 MG CR tablet   Commonly known as:  TRENtal   Take 1 tablet (400 mg) by mouth daily   Last time this was given:  400 mg on 9/1/2017  9:34 AM                                pravastatin 40 MG tablet   Commonly known as:  PRAVACHOL   Take 1 tablet (40 mg) by mouth daily   Last time this was given:  40 mg on 9/1/2017  9:33 AM                                sodium bicarbonate 650 MG tablet   Take 2 tablets (1,300 mg) by mouth 3 times daily   Last time this was  given:  1,300 mg on 9/1/2017  9:33 AM                                torsemide 5 MG tablet   Commonly known as:  DEMADEX   Take 4 tablets (20 mg) by mouth 2 times daily   Last time this was given:  20 mg on 9/1/2017  9:33 AM                                TRAMADOL HCL PO   Take 50 mg by mouth every 6 hours as needed for moderate to severe pain Pain of 6-10 out of 10                                warfarin 2.5 MG tablet   Commonly known as:  COUMADIN   Take 1 tablet (2.5 mg) by mouth daily

## 2017-08-26 NOTE — PHARMACY-VANCOMYCIN DOSING SERVICE
Pharmacy Vancomycin Initial Note  Date of Service 2017  Patient's  1940  77 year old, female    Indication: [facility]ated Pneumonia    Current estimated CrCl = Estimated Creatinine Clearance: 11.5 mL/min (based on Cr of 4.42).    Creatinine for last 3 days  2017:  6:37 AM Creatinine 4.42 mg/dL    Recent Vancomycin Level(s) for last 3 days  No results found for requested labs within last 72 hours.      Vancomycin IV Administrations (past 72 hours)      No vancomycin orders with administrations in past 72 hours.                Nephrotoxins and other renal medications (Future)    Start     Dose/Rate Route Frequency Ordered Stop    17 0900  vancomycin (VANCOCIN) 1,750 mg in NaCl 0.9 % 500 mL intermittent infusion      1,750 mg Intravenous ONCE 17 0817      17 0816  vancomycin place maki - receiving intermittent dosing      1 each Does not apply SEE ADMIN INSTRUCTIONS 17 0817            Contrast Orders - past 72 hours     None                Plan:  1.  Start vancomycin  1750 mg IV x 1 dose then, intermittently.   2.  Goal Trough Level: 15-20 mg/L   3.  Pharmacy will check trough levels as appropriate in 1-3 Days.    4. Serum creatinine levels will be ordered daily for the first week of therapy and at least twice weekly for subsequent weeks.    5. [place] method utilized to dose vancomycin therapy: Method 2    Sindy Harding, pharmD, BCPS

## 2017-08-26 NOTE — PLAN OF CARE
Problem: Goal Outcome Summary  Goal: Goal Outcome Summary  Outcome: No Change  Neuro: A&Ox4.  Intermittently forgetful.  Mildly lethargic.  Neuros intact.  Denies pain.   Cardiac: SR. BP's soft.  Team aware.  Orders to hold am dose of amlodipine and metoprolol.        Respiratory: Sating > 92% on HFNC 80% FiO2.  RR 20-22.  Dyspnea with activity: desats into the low 80's with activity.  Wheezing noted at approximately 1600.  MD notified: came to bedside to assess.  Nebs/VBG ordered.  GI/:  Cee placed.  Adequate urine output.  Bumex given x 1.  No BM this shift.   Diet/appetite: Tolerating renal diet. Eating well.  No nausea reported.  Activity:  Assist of 2 to assist with weight shift.  Sitting at edge of bed.     Pain: At acceptable level on current regimen.   Skin: Intact, no new deficits noted.  Pt has BL LE edema with venous stasis and ulceration.  WOC consult ordered.  Blanchable redness on coccyx.  2 RN skin assessment completed by Megan CORNEJO RN and Anel CASTRO RN  No valuables brought with patient.    LDA's: Left and Right PIV     Plan: Continue with POC. Notify primary team with changes.

## 2017-08-26 NOTE — LETTER
Transition Communication Hand-off for Care Transitions to Next Level of Care Provider    Name: Kathryn Banks  MRN #: [id number]  Primary Care Provider: Dheeraj Jj     Primary Clinic: [facility] [address] [place] [address]     Reason for Hospitalization:  Acidosis [E87.2]  Acute pulmonary edema (H) [J81.0]  Respiratory distress [R06.00]  Admit Date/Time: 8/26/2017  6:30 AM  Discharge Date: 9/1/17  Payor Source: Payor: BCBS / Plan: BCBS PLATINUM BLUE / Product Type: PPO /            Reason for Communication Hand-off Referral: Fragility    Discharge Plan: [facility] [place] [facility]       Concern for non-adherence with plan of care:   Y/N n  Discharge Needs Assessment:  Needs       Most Recent Value    Equipment Currently Used at Home grab bar, shower chair, walker, rolling, walker, standard, other (see comments) [scooter chair for long distances]    Transportation Available family or friend will provide          Already enrolled in Tele-monitoring program and name of program:    Follow-up specialty is recommended: Yes    Follow-up plan:  Future Appointments  Date Time Provider Department Center   9/2/2017 6:00 AM Anel Gutierrez, PT [facility]   9/6/2017 7:30 AM  LAB [facility]   9/6/2017 8:00 AM Destiny Berrios, NP [facility]   9/15/2017 1:30 PM Dheeraj Jj MD [facility]   9/19/2017 11:30 AM LAB FIRST FLOOR [facility] [place]   9/19/2017 12:00 PM Vibha Barfield MD [facility]       Any outstanding tests or procedures:        Referrals     Future Labs/Procedures    Medication Therapy Management Referral     Comments:    Reason for referral:  on more than 5 medications and managing chronic disease and on more than 10 medications and hospitalized or in the ED in the past 6 months     This service is designed to help you get the most from your medications.  A specially trained pharmacist will work closely with you and your doctors  to solve any  problems related to your medications and to help you get the   best results from taking them.      The Medication Therapy Management staff will call you to schedule an appointment.            Key Recommendations:      Donna Dickerson    AVS/Discharge Summary is the source of truth; this is a helpful guide for improved communication of patient story

## 2017-08-27 LAB
ABO + RH BLD: NORMAL
ABO + RH BLD: NORMAL
ALBUMIN SERPL-MCNC: 1.8 G/DL (ref 3.4–5)
ANION GAP SERPL CALCULATED.3IONS-SCNC: 8 MMOL/L (ref 3–14)
APTT PPP: 35 SEC (ref 22–37)
APTT PPP: NORMAL SEC (ref 22–37)
BACTERIA SPEC CULT: NO GROWTH
BASOPHILS # BLD AUTO: 0.1 10E9/L (ref 0–0.2)
BASOPHILS NFR BLD AUTO: 0.5 %
BILIRUB SERPL-MCNC: 0.2 MG/DL (ref 0.2–1.3)
BLD GP AB SCN SERPL QL: NORMAL
BLD PROD TYP BPU: NORMAL
BLD PROD TYP BPU: NORMAL
BLD UNIT ID BPU: 0
BLOOD BANK CMNT PATIENT-IMP: NORMAL
BLOOD PRODUCT CODE: NORMAL
BPU ID: NORMAL
BUN SERPL-MCNC: 39 MG/DL (ref 7–30)
CALCIUM SERPL-MCNC: 6.1 MG/DL (ref 8.5–10.1)
CHLORIDE SERPL-SCNC: 114 MMOL/L (ref 94–109)
CO2 SERPL-SCNC: 23 MMOL/L (ref 20–32)
CREAT SERPL-MCNC: 4.55 MG/DL (ref 0.52–1.04)
DIFFERENTIAL METHOD BLD: ABNORMAL
EOSINOPHIL # BLD AUTO: 0.1 10E9/L (ref 0–0.7)
EOSINOPHIL NFR BLD AUTO: 1.2 %
ERYTHROCYTE [DISTWIDTH] IN BLOOD BY AUTOMATED COUNT: 22.1 % (ref 10–15)
ERYTHROCYTE [DISTWIDTH] IN BLOOD BY AUTOMATED COUNT: 22.2 % (ref 10–15)
ERYTHROCYTE [DISTWIDTH] IN BLOOD BY AUTOMATED COUNT: 22.3 % (ref 10–15)
FIBRINOGEN PPP-MCNC: 453 MG/DL (ref 200–420)
FIBRINOGEN PPP-MCNC: NORMAL MG/DL (ref 200–420)
GFR SERPL CREATININE-BSD FRML MDRD: 9 ML/MIN/1.7M2
GLUCOSE BLDC GLUCOMTR-MCNC: 104 MG/DL (ref 70–99)
GLUCOSE BLDC GLUCOMTR-MCNC: 136 MG/DL (ref 70–99)
GLUCOSE BLDC GLUCOMTR-MCNC: 160 MG/DL (ref 70–99)
GLUCOSE BLDC GLUCOMTR-MCNC: 168 MG/DL (ref 70–99)
GLUCOSE BLDC GLUCOMTR-MCNC: 94 MG/DL (ref 70–99)
GLUCOSE SERPL-MCNC: 117 MG/DL (ref 70–99)
HCT VFR BLD AUTO: 20.8 % (ref 35–47)
HCT VFR BLD AUTO: 23 % (ref 35–47)
HCT VFR BLD AUTO: 23.1 % (ref 35–47)
HGB BLD-MCNC: 6.2 G/DL (ref 11.7–15.7)
HGB BLD-MCNC: 6.7 G/DL (ref 11.7–15.7)
HGB BLD-MCNC: 6.8 G/DL (ref 11.7–15.7)
HGB BLD-MCNC: 7.8 G/DL (ref 11.7–15.7)
HGB BLD-MCNC: 7.8 G/DL (ref 11.7–15.7)
IMM GRANULOCYTES # BLD: 0.1 10E9/L (ref 0–0.4)
IMM GRANULOCYTES NFR BLD: 0.6 %
INR PPP: 1.25 (ref 0.86–1.14)
INR PPP: NORMAL (ref 0.86–1.14)
INTERPRETATION ECG - MUSE: NORMAL
LDH SERPL L TO P-CCNC: 294 U/L (ref 81–234)
LYMPHOCYTES # BLD AUTO: 0.8 10E9/L (ref 0.8–5.3)
LYMPHOCYTES NFR BLD AUTO: 8.3 %
Lab: NORMAL
MCH RBC QN AUTO: 24.7 PG (ref 26.5–33)
MCH RBC QN AUTO: 25.1 PG (ref 26.5–33)
MCH RBC QN AUTO: 25.1 PG (ref 26.5–33)
MCHC RBC AUTO-ENTMCNC: 29.1 G/DL (ref 31.5–36.5)
MCHC RBC AUTO-ENTMCNC: 29.4 G/DL (ref 31.5–36.5)
MCHC RBC AUTO-ENTMCNC: 29.8 G/DL (ref 31.5–36.5)
MCV RBC AUTO: 84 FL (ref 78–100)
MCV RBC AUTO: 85 FL (ref 78–100)
MCV RBC AUTO: 85 FL (ref 78–100)
MONOCYTES # BLD AUTO: 0.6 10E9/L (ref 0–1.3)
MONOCYTES NFR BLD AUTO: 6.5 %
NEUTROPHILS # BLD AUTO: 7.7 10E9/L (ref 1.6–8.3)
NEUTROPHILS NFR BLD AUTO: 82.9 %
NRBC # BLD AUTO: 0 10*3/UL
NRBC BLD AUTO-RTO: 0 /100
NUM BPU REQUESTED: 1
PF4 HEPARIN CMPLX AB SER QL: ABNORMAL
PHOSPHATE SERPL-MCNC: 6.3 MG/DL (ref 2.5–4.5)
PLATELET # BLD AUTO: 196 10E9/L (ref 150–450)
PLATELET # BLD AUTO: 212 10E9/L (ref 150–450)
PLATELET # BLD AUTO: 226 10E9/L (ref 150–450)
POTASSIUM SERPL-SCNC: 4.6 MMOL/L (ref 3.4–5.3)
RBC # BLD AUTO: 2.47 10E12/L (ref 3.8–5.2)
RBC # BLD AUTO: 2.71 10E12/L (ref 3.8–5.2)
RBC # BLD AUTO: 2.71 10E12/L (ref 3.8–5.2)
RETICS # AUTO: 49.3 10E9/L (ref 25–95)
RETICS/RBC NFR AUTO: 1.8 % (ref 0.5–2)
SODIUM SERPL-SCNC: 146 MMOL/L (ref 133–144)
SPECIMEN EXP DATE BLD: NORMAL
SPECIMEN SOURCE: NORMAL
TRANSFUSION STATUS PATIENT QL: NORMAL
TRANSFUSION STATUS PATIENT QL: NORMAL
WBC # BLD AUTO: 10 10E9/L (ref 4–11)
WBC # BLD AUTO: 8.7 10E9/L (ref 4–11)
WBC # BLD AUTO: 9.4 10E9/L (ref 4–11)

## 2017-08-27 PROCEDURE — 80069 RENAL FUNCTION PANEL: CPT | Performed by: STUDENT IN AN ORGANIZED HEALTH CARE EDUCATION/TRAINING PROGRAM

## 2017-08-27 PROCEDURE — 85730 THROMBOPLASTIN TIME PARTIAL: CPT | Performed by: INTERNAL MEDICINE

## 2017-08-27 PROCEDURE — 83010 ASSAY OF HAPTOGLOBIN QUANT: CPT | Performed by: INTERNAL MEDICINE

## 2017-08-27 PROCEDURE — 83615 LACTATE (LD) (LDH) ENZYME: CPT | Performed by: INTERNAL MEDICINE

## 2017-08-27 PROCEDURE — 86900 BLOOD TYPING SEROLOGIC ABO: CPT | Performed by: INTERNAL MEDICINE

## 2017-08-27 PROCEDURE — 40000611 ZZHCL STATISTIC MORPHOLOGY W/INTERP HEMEPATH TC 85060: Performed by: INTERNAL MEDICINE

## 2017-08-27 PROCEDURE — A9270 NON-COVERED ITEM OR SERVICE: HCPCS | Mod: GY | Performed by: STUDENT IN AN ORGANIZED HEALTH CARE EDUCATION/TRAINING PROGRAM

## 2017-08-27 PROCEDURE — 36415 COLL VENOUS BLD VENIPUNCTURE: CPT | Performed by: INTERNAL MEDICINE

## 2017-08-27 PROCEDURE — 86850 RBC ANTIBODY SCREEN: CPT | Performed by: INTERNAL MEDICINE

## 2017-08-27 PROCEDURE — 85025 COMPLETE CBC W/AUTO DIFF WBC: CPT | Performed by: INTERNAL MEDICINE

## 2017-08-27 PROCEDURE — 94660 CPAP INITIATION&MGMT: CPT | Performed by: OPTOMETRIST

## 2017-08-27 PROCEDURE — 40000983 ZZH STATISTIC HFNC ADULT NON-CPAP: Performed by: OPTOMETRIST

## 2017-08-27 PROCEDURE — 85018 HEMOGLOBIN: CPT | Performed by: INTERNAL MEDICINE

## 2017-08-27 PROCEDURE — 99233 SBSQ HOSP IP/OBS HIGH 50: CPT | Mod: GC | Performed by: INTERNAL MEDICINE

## 2017-08-27 PROCEDURE — 85027 COMPLETE CBC AUTOMATED: CPT | Performed by: STUDENT IN AN ORGANIZED HEALTH CARE EDUCATION/TRAINING PROGRAM

## 2017-08-27 PROCEDURE — 40000141 ZZH STATISTIC PERIPHERAL IV START W/O US GUIDANCE

## 2017-08-27 PROCEDURE — 40000275 ZZH STATISTIC RCP TIME EA 10 MIN

## 2017-08-27 PROCEDURE — 86901 BLOOD TYPING SEROLOGIC RH(D): CPT | Performed by: INTERNAL MEDICINE

## 2017-08-27 PROCEDURE — 86022 PLATELET ANTIBODIES: CPT | Performed by: INTERNAL MEDICINE

## 2017-08-27 PROCEDURE — 25000125 ZZHC RX 250: Performed by: STUDENT IN AN ORGANIZED HEALTH CARE EDUCATION/TRAINING PROGRAM

## 2017-08-27 PROCEDURE — S0171 BUMETANIDE 0.5 MG: HCPCS | Performed by: STUDENT IN AN ORGANIZED HEALTH CARE EDUCATION/TRAINING PROGRAM

## 2017-08-27 PROCEDURE — 40000275 ZZH STATISTIC RCP TIME EA 10 MIN: Performed by: OPTOMETRIST

## 2017-08-27 PROCEDURE — 36415 COLL VENOUS BLD VENIPUNCTURE: CPT | Performed by: STUDENT IN AN ORGANIZED HEALTH CARE EDUCATION/TRAINING PROGRAM

## 2017-08-27 PROCEDURE — 94640 AIRWAY INHALATION TREATMENT: CPT | Mod: 76

## 2017-08-27 PROCEDURE — 40000047 ZZH STATISTIC CTO2 CONT OXYGEN TECH TIME EA 90 MIN: Performed by: OPTOMETRIST

## 2017-08-27 PROCEDURE — P9016 RBC LEUKOCYTES REDUCED: HCPCS | Performed by: EMERGENCY MEDICINE

## 2017-08-27 PROCEDURE — 25000128 H RX IP 250 OP 636: Performed by: STUDENT IN AN ORGANIZED HEALTH CARE EDUCATION/TRAINING PROGRAM

## 2017-08-27 PROCEDURE — 85384 FIBRINOGEN ACTIVITY: CPT | Performed by: INTERNAL MEDICINE

## 2017-08-27 PROCEDURE — 40000274 ZZH STATISTIC RCP CONSULT EA 30 MIN

## 2017-08-27 PROCEDURE — 25000132 ZZH RX MED GY IP 250 OP 250 PS 637: Mod: GY | Performed by: STUDENT IN AN ORGANIZED HEALTH CARE EDUCATION/TRAINING PROGRAM

## 2017-08-27 PROCEDURE — 94640 AIRWAY INHALATION TREATMENT: CPT | Mod: 76 | Performed by: OPTOMETRIST

## 2017-08-27 PROCEDURE — 85610 PROTHROMBIN TIME: CPT | Performed by: INTERNAL MEDICINE

## 2017-08-27 PROCEDURE — 94640 AIRWAY INHALATION TREATMENT: CPT | Performed by: OPTOMETRIST

## 2017-08-27 PROCEDURE — 85045 AUTOMATED RETICULOCYTE COUNT: CPT | Performed by: INTERNAL MEDICINE

## 2017-08-27 PROCEDURE — 12000006 ZZH R&B IMCU INTERMEDIATE UMMC

## 2017-08-27 PROCEDURE — 25000131 ZZH RX MED GY IP 250 OP 636 PS 637: Mod: GY | Performed by: STUDENT IN AN ORGANIZED HEALTH CARE EDUCATION/TRAINING PROGRAM

## 2017-08-27 PROCEDURE — 82247 BILIRUBIN TOTAL: CPT | Performed by: INTERNAL MEDICINE

## 2017-08-27 PROCEDURE — 00000146 ZZHCL STATISTIC GLUCOSE BY METER IP

## 2017-08-27 RX ORDER — METHYLPREDNISOLONE SODIUM SUCCINATE 125 MG/2ML
125 INJECTION, POWDER, LYOPHILIZED, FOR SOLUTION INTRAMUSCULAR; INTRAVENOUS
Status: DISCONTINUED | OUTPATIENT
Start: 2017-08-27 | End: 2017-08-29

## 2017-08-27 RX ORDER — DIPHENHYDRAMINE HYDROCHLORIDE 50 MG/ML
50 INJECTION INTRAMUSCULAR; INTRAVENOUS
Status: DISCONTINUED | OUTPATIENT
Start: 2017-08-27 | End: 2017-08-29

## 2017-08-27 RX ADMIN — BUMETANIDE 2 MG: 0.25 INJECTION INTRAMUSCULAR; INTRAVENOUS at 12:41

## 2017-08-27 RX ADMIN — SODIUM BICARBONATE 650 MG TABLET 1300 MG: at 13:34

## 2017-08-27 RX ADMIN — METOPROLOL TARTRATE 12.5 MG: 25 TABLET, FILM COATED ORAL at 09:00

## 2017-08-27 RX ADMIN — CALCIUM ACETATE 667 MG: 667 CAPSULE ORAL at 17:29

## 2017-08-27 RX ADMIN — VITAMIN D, TAB 1000IU (100/BT) 2000 UNITS: 25 TAB at 09:01

## 2017-08-27 RX ADMIN — INSULIN ASPART 1 UNITS: 100 INJECTION, SOLUTION INTRAVENOUS; SUBCUTANEOUS at 16:03

## 2017-08-27 RX ADMIN — IPRATROPIUM BROMIDE AND ALBUTEROL SULFATE 3 ML: .5; 3 SOLUTION RESPIRATORY (INHALATION) at 04:13

## 2017-08-27 RX ADMIN — METOPROLOL TARTRATE 12.5 MG: 25 TABLET, FILM COATED ORAL at 19:33

## 2017-08-27 RX ADMIN — IPRATROPIUM BROMIDE AND ALBUTEROL SULFATE 3 ML: .5; 3 SOLUTION RESPIRATORY (INHALATION) at 20:45

## 2017-08-27 RX ADMIN — SODIUM BICARBONATE 650 MG TABLET 1300 MG: at 09:01

## 2017-08-27 RX ADMIN — PRAVASTATIN SODIUM 40 MG: 20 TABLET ORAL at 09:01

## 2017-08-27 RX ADMIN — PENTOXIFYLLINE 400 MG: 400 TABLET, FILM COATED, EXTENDED RELEASE ORAL at 09:01

## 2017-08-27 RX ADMIN — ASPIRIN 81 MG: 81 TABLET, COATED ORAL at 09:00

## 2017-08-27 RX ADMIN — Medication 1 CAPSULE: at 09:01

## 2017-08-27 RX ADMIN — FERROUS SULFATE TAB 325 MG (65 MG ELEMENTAL FE) 325 MG: 325 (65 FE) TAB at 09:01

## 2017-08-27 RX ADMIN — IPRATROPIUM BROMIDE AND ALBUTEROL SULFATE 3 ML: .5; 3 SOLUTION RESPIRATORY (INHALATION) at 08:03

## 2017-08-27 RX ADMIN — IPRATROPIUM BROMIDE AND ALBUTEROL SULFATE 3 ML: .5; 3 SOLUTION RESPIRATORY (INHALATION) at 12:05

## 2017-08-27 RX ADMIN — CALCIUM ACETATE 667 MG: 667 CAPSULE ORAL at 09:00

## 2017-08-27 RX ADMIN — CALCIUM ACETATE 667 MG: 667 CAPSULE ORAL at 13:33

## 2017-08-27 RX ADMIN — OYSTER SHELL CALCIUM WITH VITAMIN D 1 TABLET: 500; 200 TABLET, FILM COATED ORAL at 09:01

## 2017-08-27 RX ADMIN — IPRATROPIUM BROMIDE AND ALBUTEROL SULFATE 3 ML: .5; 3 SOLUTION RESPIRATORY (INHALATION) at 15:12

## 2017-08-27 RX ADMIN — SODIUM BICARBONATE 650 MG TABLET 1300 MG: at 19:33

## 2017-08-27 RX ADMIN — BUMETANIDE 2 MG: 0.25 INJECTION INTRAMUSCULAR; INTRAVENOUS at 19:33

## 2017-08-27 RX ADMIN — BUMETANIDE 2 MG: 0.25 INJECTION INTRAMUSCULAR; INTRAVENOUS at 03:39

## 2017-08-27 NOTE — PROGRESS NOTES
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 Multiple provocation from infection (cellulitis/ PNA/UTI) and was in the ICU before for respiratory distress. Cr on this admission was 4.4 similar to last discharge Cr, however unclear if this could be MARTÍN from a recovered Cr. Of note, patient was not discharged with diuretics. Less suspicous of PNA given no fever, no productive sputum, negative procal and diffuse B-line on bedside U/S. FEUrea supports pre-renal  - Stop all antibiotics, continue to monitor for fever  - continue with bumex 2 mg IV tid, with good UOP  - continue high flow nasal canula - ween as tolerated  - Daily I/O, weight, BMP, avoid nephrotoxic agents  - Cee's in because of urinary retention after getting diuresis    # Anemia  Hemoglobin found to be in 6s on 8/27. No current signs or symptoms of bleeding. Vitals are stable. Transfused 1 unit PRBCs on 8/27 and Hemoglobin came up to 7.8.  - monitor Hb q 8 hr  - ordered hemolysis labs and peripheral smear before transfusion.     # CKD-BMD, secondary hyperPTH, VitaminD deficient  - continue phoslo, vitaminD, calcium carbonate  - last ferritin 242, continue ferrous supplement     # HTN  Needing diuresis; thus will decrease dosing  - hold amlodipine  - metoprolol 25 -> 12.5 mg bid     # DM2  Diet control, likely because of deteriorating renal function. A1c  6/22/2017 = 7.3  - Low SSI, hypoglycemia protocol  - POCT q4 while NPO     # Chronic venous ulcer   W/u in last admission, not appear to be infected  - WOC nurse consult  - Vascular surgery follow-up for outpatient TCO2 study, Dermatology f/u in 4-6 weeks  - Continue pentoxifylline       Chronic problems  # Vertical Diplopia: no change currently  # Afib s/p ablation 6/2017 : no recurrent, cardiology determined to be off from warfarin  # CAD s/p CABG : continue ASA, pracastatin  # Chronic diarrhea : last C.diff check 8/15, will CTM  # Gluteal cyst : stable, no I&D per last surgery     FEN: renal diet  DVT Prophylaxis:  SCD  Disposition: PT/OT, likely same TCU  Code Status: FULL    Patient staffed with Dr. Epperson who agrees with above plan    Gilda Mejia MD  Internal Medicine & Pediatrics PGY1  [facility]  Pager: [phone]    ___________________________________________________________________    Subjective & Interval Hx:    No acute events overnight. Notes feeling short of breath. Admits to some intermittent bright red blood in the toilet, but none lately. Denies any black stools, no other signs of symptoms of bleeding. Transfused 1 unit PRBCs for low hemoglobin. Continuing to diurese.     Last 24 hr care team notes reviewed.   ROS:  4 point ROS including Respiratory, CV, GI and  (other than that noted in the HPI) is negative    Medications:  Reviewed in EPIC. List below for reference    Physical Exam:  Blood pressure 128/71, pulse 78, temperature 98.5  F (36.9  C), temperature source Oral, resp. rate 20, SpO2 100 %.    I/O last 3 completed shifts:  In: 630 [P.O.:630]  Out: 3025 [Urine:3025]    General: AAOx3, pleasantly interactive  HEENT:  PERRL, EOMI, MMM   Cardiac: RRR. No m/r/g. Normal S1, S2.  Pulm: occasional wheezes,  Bilateral inspiratory crackles.  Abd: +BS, soft, non distended, non tender. No hepatosplenomegaly.  Skin: No rash  MSK: No deformities, no joint swelling, 1+ edema at legs, more edema on  arms   Neuro: CN grossly intact, no focal deficits  Psych: normal mood, full affect    Labs & Studies of Note:   Labs and studies reviewed in EPIC    Unresulted Labs Ordered in the Past 30 Days of this Admission     Date and Time Order Name Status Description    8/27/2017 0944 Heparin induced thrombocytopenia In process     8/27/2017 0813 Haptoglobin In process     8/27/2017 0813 Blood Morphology Pathologist Review In process     8/26/2017 1250 Urine Culture Aerobic Bacterial Preliminary     8/26/2017 0715 Blood culture Preliminary     8/26/2017 0715 Blood culture Preliminary     8/22/2017 1410 CRYOGLOBULIN QUANTITATIVE In process     8/12/2017 1800 ILA Preliminary     8/12/2017 1800 ILA Preliminary           Medications list for Reference:   Current Facility-Administered Medications   Medication     acetaminophen (TYLENOL) tablet 650 mg     aspirin EC EC tablet 81 mg     calcium acetate (PHOSLO) capsule 667 mg     calcium carb 1250 mg (500 mg [place])/vitamin D 200 units (OSCAL with D) per tablet 1 tablet     cholecalciferol (vitamin D) tablet 2,000 Units     ferrous sulfate (IRON) tablet 325 mg     lactobacillus rhamnosus (GG) (CULTURELL) capsule 1 capsule     pentoxifylline (TRENtal) CR tablet 400 mg     pravastatin (PRAVACHOL) tablet 40 mg     sodium bicarbonate tablet 1,300 mg     naloxone (NARCAN) injection 0.1-0.4 mg     senna-docusate (SENOKOT-S;PERICOLACE) 8.6-50 MG per tablet 1-2 tablet     ondansetron (ZOFRAN-ODT) ODT tab 4 mg    Or     ondansetron (ZOFRAN) injection 4 mg     prochlorperazine (COMPAZINE) injection 5 mg    Or     prochlorperazine (COMPAZINE) tablet 5 mg    Or     prochlorperazine (COMPAZINE) Suppository 12.5 mg     bumetanide (BUMEX) injection 2 mg     glucose 40 % gel 15-30 g    Or     dextrose 50 % injection 25-50 mL    Or     glucagon injection 1 mg     insulin aspart (NovoLOG) inj (RAPID ACTING)     insulin aspart (NovoLOG) inj (RAPID ACTING)     metoprolol (LOPRESSOR) half-tab 12.5 mg      ipratropium - albuterol 0.5 mg/2.5 mg/3 mL (DUONEB) neb solution 3 mL     ipratropium - albuterol 0.5 mg/2.5 mg/3 mL (DUONEB) neb solution 3 mL

## 2017-08-27 NOTE — PLAN OF CARE
Problem: Goal Outcome Summary  Goal: Goal Outcome Summary  OT 6B: Cancel-Pt not medically appropriate for OT today d/t critically low hemoglobin. Will reschedule per OT POC.

## 2017-08-27 NOTE — PLAN OF CARE
Problem: Goal Outcome Summary  Goal: Goal Outcome Summary  Outcome: No Change  /64 (BP Location: Right arm)  Pulse 78  Temp 99.1  F (37.3  C) (Oral)  Resp 20  SpO2 95%  HR sinus rhythm 80s, on 65% FiO2 via bipap with sleep and % FiO2 via HFNC when awake. Desaturates easily with light activity. O2 as low as 66% during desaturation episodes. Pt will occasionally pulls off O2 in sleep.  Pt A&Ox3, forgetful. Resting comfortably in between assessments. Using call light infrequently, bed alarm set. Pt endorses pain to ulcers to BLEs. Renal diet orders. Voiding adequately via moreira, receiving scheduled 2 mg Bumex q8h. 1 large, loose continent BM. Plan for continued weaning with O2. Nursing will continue to follow the POC and update the MD team with concerns.

## 2017-08-27 NOTE — PROGRESS NOTES
Critical hgb value of 6.7 called to this writer at 0630.  Yesterday's hgb 7.4. Pt showing now signs of bleeding. VSS. [facility] cover notified. MD reviewing if transfusion will be needed. Nursing will continue to follow the POC and update the MD team with concerns.

## 2017-08-27 NOTE — CONSULTS
Nephrology Initial Consult  August 27, 2017      Kathryn Banks MRN:[id number] YOB: 1940  Date of Admission:8/26/2017  Primary care provider: Dheeraj Jj  Requesting physician: Rajan Epperson MD    ASSESSMENT AND RECOMMENDATIONS:   Kathryn Banks is a 77 year old female with h/o HFpEF, CAD s/p CABG, progressive CKD with several MARTÍN events who was recently discharged after admission with volume overload and MARTÍN on CKD with improving creatinine who is re-admitted from TCU with hypoxia and anasarca.     # MARTÍN on CKD, progressive renal failure  Patient with stable creatinine/eGFR from recent f/u in clinic. CKD thought due to MARTÍN events, DM, HTN, and renovascular disease. Pt has been following with Dr Barfield for about 3 months it appears per chart review and should continue to do so as they have started to talk about dialysis options. No acute dialysis indications at this time. Pt explaining that she is strongly considering dialysis when needed and is planning to learn more through Renal clinic.   -recommend obtaining venous mapping for fistula planning while admitted if able  -have messaged Dr Barfield about pt's admission  -recommend arranging close PCP, nephrology, and CORE clinic (see below) f/u upon discharge       # anasarca  # hypervolemia  # acute hypoxic respiratory failure  Pt with HFpEF hx in addition to MARTÍN on CKD and there has been c/f CRS as component of her renal failure multiple times. Thus would be reasonable to enroll on CORE clinic for close diuretic f/u as this pt is clearly a diuretic challenge with these several admissions. Agree with somewhat aggressive diuresis in light of hypoxia but would be wary of hypotension to prevent further renal insults  -recommend CORE clinic referral   -consider cardiology consult if needed   -salt restrict diet   -reasonable to continue Bumex 2mg IV TID at this time, pt very likely to require PO diuretics prescribed at discharge for outpt f/u  and adjustment       # electrolytes  Potassium WNL  Hypernatremia - Pt with mild hyperNa to 146-147. In light of this would actually allow unrestricted free water intake to allow to correct as will achieve dietary effects on edema but salt restricting (as above, anasarca/hypervolemia).   -rec allowing unrestricted fluid intake so long as salt restricted diet         # acid-base  Bicarb good at 23, continue home bicarb tabs.     # anemia  Pt with worsened anemia to 6s and primary team working up. BAYLEE on iron studies and pt on ferrous sulfate at home. Pt will require her consideration of GI workup as well given most-menopausal and iron deficient if not already done.   -rec Venofer 200mg IV daily x5 (we have ordered for you), though pt need not complete if dispo pending before 5 day course completed   -rec continue her home oral iron afterward (ferrous sulf 325mg daily)  -would pursue EPO in outpt setting through nephrology f/u   -chronic Hgb goal 10, acute goal/transfusion goal per primary team     # MBD  Hypocalcemia with corrected calcium 7.9. Patient on cholecalciferol 2000 units at home but given degree of CKD may not be able to convert this active form. Thus would recommend switch to calcitriol and continuing that change upon discharge.   -rec calcitriol for vit D supplementation  -rec continue home Phoslo   -rec check ionized calcium, replete if needed, continue home oral calcium       Renal will sign off. Please call with questions.     Recommendations were communicated to primary team via note and phone call to cross cover.     Seen and discussed with Dr. Matthew.     Mike Medina MD   Renal Fellow  [phone]      REASON FOR CONSULT: re-admission with anasarca    HISTORY OF PRESENT ILLNESS:  Kathryn Banks is a 77 year old female with h/o HFpEF, CAD s/p CABG, progressive CKD with several MARTÍN events who was recently discharged after admission with volume overload and MARTÍN on CKD with improving  creatinine who is re-admitted from TCU with hypoxia and anasarca.     The patient has had several admissions for volume overload recently with c/f her worsening renal function and her HF as main drivers. CRS has been suspected as the cause of her MARTÍN events, though with last admission in early August her cellulitis and ulcers of her LEs also raised c/f ATN from sepsis. She was discharged with creatinine, which has been progressively worsening from 1.3-1.5 at the beginning of 2017 to as high as 5.5 last admission through several MARTÍN/progressive CKD events, down to 5.1 and was 4.5 when seen 8/22 in Nephrology clinic with Dr Barfield. The pt it appears had been over aggressively diuresed as Lasix was stopped inpt, though it appears from discharge day nephrology progress note diuretic resumption was recommended but Lasix was discontinued from home meds per discharge summary. Pt seen in Renal clinic just a few days later where due to reported diarrhea, ongoing LE infection with c/f ATN as cause of most recent MARTÍN, and hypernatremia pt was encouraged to PO more free water and diuretics were not restarted. Pt states she didn't really change in PO intake but did become SOB at TCU. She became hypoxic and was admitted from TCU on BIPAP.     Pt denies loss of appetite, N/V, CP or abd pain. LEs much improved with regard to redness pt states. No fevers.     PAST MEDICAL HISTORY:  Reviewed with patient on 08/27/2017     Past Medical History:   Diagnosis Date     Carpal tunnel syndrome      Coronary atherosclerosis of native coronary artery 2006    5 vessel CABG     OBSTRUCTIVE SLEEP APNEA     using CPAP     Osteoarthrosis, unspecified whether generalized or localized, unspecified site     generalized arthritis, particularly in her hands and feet         Other and combined forms of senile cataract 2000    Bilateral     Other and unspecified hyperlipidemia      RESTLESS LEGS SYNDROME      TENOSYNOV HAND/WRIST NEC 6/30/2006     Type  II or unspecified type diabetes mellitus without mention of complication, not stated as uncontrolled 2001    Diabetes mellitus/pt is diet controlled with weight loss     Typical atrial flutter (H) 01/17/2007    ablation 6/7/2017     Unspecified essential hypertension        Past Surgical History:   Procedure Laterality Date     ANGIOPLASTY       C APPENDECTOMY       C CABG, VEIN, FIVE  12/06    SVG x 4 and LIMA to LAD     C SOTO W/O FACETEC FORAMOT/DSKC 1/2 VRT SEG, LUMBAR  1968     C REMV CATARACT INTRACAP,INSERT LENS      Bilateral     C TOTAL ABDOM HYSTERECTOMY  1995     - fibroids     C VAGOTOMY/PYLOROPLASTY,SELECT  1970     COLONOSCOPY  12/3/2007     COLONOSCOPY  1/17/2014    Procedure: COLONOSCOPY;  Colonoscopy;  Surgeon: Zack Stearns MD;  Location:  GI     CYSTOSCOPY  11/25/09    [facility]     ENDOSCOPY  03/21/2000    Upper GI     HC COLONOSCOPY THRU STOMA, DIAGNOSTIC  10/7/04    poor prep, repeat in 2-3 years     HC COLONOSCOPY W/WO BRUSH/WASH  10/31/07    Repeat-poor quality prep     HC REMOVAL OF OVARY/TUBE(S)      Salpingo-Oophorectomy, Unilateral     HC REVISE MEDIAN N/CARPAL TUNNEL SURG      Carpal tunnel release     HC UGI ENDOSCOPY DIAG W BIOPSY  10/31/07     HC UGI ENDOSCOPY, SIMPLE EXAM  12/3/2007     OPEN REDUCTION INTERNAL FIXATION HIP NAILING Left 7/3/2016    Procedure: OPEN REDUCTION INTERNAL FIXATION HIP NAILING;  Surgeon: Lake Schulz MD;  Location:  OR        MEDICATIONS:  PTA Meds  Prior to Admission medications    Medication Sig Last Dose Taking? Auth Provider   ACETAMINOPHEN PO Take 650 mg by mouth every 4 hours as needed for pain For pain 1-5 out of 10   Reported, Patient   TRAMADOL HCL PO Take 50 mg by mouth every 6 hours as needed for moderate to severe pain Pain of 6-10 out of 10   Reported, Patient   cyanocobalamin (VITAMIN B12) 1000 MCG/ML injection Inject 1 mL into the muscle every 30 days   Reported, Patient   aspirin 81 MG tablet Take 1 tablet (81 mg) by mouth daily    Yareli Lorenzo MD   sodium bicarbonate 650 MG tablet Take 2 tablets (1,300 mg) by mouth 3 times daily   Yareli Lorenzo MD   nitroGLYcerin (NITROSTAT) 0.4 MG sublingual tablet Place 1 tablet (0.4 mg) under the tongue every 5 minutes as needed for chest pain   Yareli Lorenzo MD   gabapentin (NEURONTIN) 300 MG capsule Take 1 capsule (300 mg) by mouth At Bedtime   Yareli Lorenzo MD   pravastatin (PRAVACHOL) 40 MG tablet Take 1 tablet (40 mg) by mouth daily   Yareli Lorenzo MD   metoprolol (LOPRESSOR) 50 MG tablet Take 0.5 tablets (25 mg) by mouth 2 times daily   Yareli Lorenzo MD   amLODIPine (NORVASC) 5 MG tablet Take 2 tablets (10 mg) by mouth daily   Yareli Lorenzo MD   ferrous sulfate (IRON) 325 (65 FE) MG tablet Take 1 tablet (325 mg) by mouth daily (with breakfast)   Yareli Lorenzo MD   calcium acetate (PHOSLO) 667 MG CAPS capsule Take 1 capsule (667 mg) by mouth 3 times daily (with meals)   Yareli Lorenzo MD   pentoxifylline (TRENTAL) 400 MG CR tablet Take 1 tablet (400 mg) by mouth daily   Yareli Lorenzo MD   cholecalciferol 2000 UNITS tablet Take 2,000 Units by mouth daily   Yareli Lorenzo MD   calcium carbonate-vitamin D 500-400 MG-UNIT TABS per tablet Take 1 tablet by mouth daily   Yareli Lorenzo MD   Lactobacillus (ACIDOPHILUS) tablet Take 1 tablet by mouth daily   Lake Pierre MD   ORDER FOR DME Equipment being ordered: cpap machine, mask, humidifier, tubing and filters   Dheeraj Jj MD      Current Meds    aspirin EC  81 mg Oral Daily     calcium acetate  667 mg Oral TID w/meals     calcium carb 1250 mg (500 mg [place])/vitamin D 200 units  1 tablet Oral Daily     cholecalciferol  2,000 Units Oral Daily     ferrous sulfate  325 mg Oral Daily with breakfast     lactobacillus rhamnosus (GG)  1 capsule Oral Daily     pentoxifylline  400 mg Oral Daily     pravastatin  40 mg Oral Daily     sodium bicarbonate  1,300 mg  Oral TID     bumetanide  2 mg Intravenous Q8H     insulin aspart  1-3 Units Subcutaneous TID AC     insulin aspart  1-3 Units Subcutaneous At Bedtime     metoprolol  12.5 mg Oral BID     ipratropium - albuterol 0.5 mg/2.5 mg/3 mL  3 mL Nebulization 4x daily     Infusion Meds       ALLERGIES:    Allergies   Allergen Reactions     Ciprofloxacin Nausea and Vomiting     Zofran did not help     Oxycodone Visual Disturbance     Delusions, blackouts      Lisinopril Cough     Sulfa Drugs GI Disturbance     LOSS OF TASTE       REVIEW OF SYSTEMS:  A 10 point review of systems was negative except as noted above.    SOCIAL HISTORY:   Social History     Social History     Marital status:      Spouse name: Urbano Banks     Number of children: 2     Years of education: 13     Occupational History     Ministry coordinator      [employer]     Social History Main Topics     Smoking status: Former Smoker     Years: 10.00     Quit date: 1969     Smokeless tobacco: Never Used     Alcohol use 0.0 oz/week     0 Standard drinks or equivalent per week      Comment: occasional- 2 drinks per month     Drug use: No     Sexual activity: Yes     Birth control/ protection: Surgical     Other Topics Concern     Parent/Sibling W/ Cabg, Mi Or Angioplasty Before 65f 55m? No     Social History Narrative   [facility]     Reviewed with patient     FAMILY MEDICAL HISTORY:   Family History   Problem Relation Age of Onset     DIABETES Father      AODM     Neurologic Disorder Father      Parkinson's     Blood Disease Father       from blood clot from leg to lung immediately after hip fracture     DIABETES Paternal Grandmother      adult onset     DIABETES Maternal Grandmother      adult onset     Hypertension No family hx of      CEREBROVASCULAR DISEASE No family hx of      Reviewed with patient     PHYSICAL EXAM:   Temp  Av.8  F (36.6  C)  Min: 95.7  F (35.4  C)  Max: 99.4  F  (37.4  C)      Pulse  Av.9  Min: 49  Max: 85 Resp  Avg: 15.4  Min: 6  Max: 26  SpO2  Av %  Min: 78 %  Max: 100 %  FiO2 (%)  Av.1 %  Min: 4 %  Max: 100 %       /69 (BP Location: Right arm)  Pulse 78  Temp 98.8  F (37.1  C) (Oral)  Resp 20  SpO2 100%   Date 17 07 - 17 0659   Shift 3428-1891 5602-3231 4968-6766 24 Hour Total   I  N  T  A  K  E   P.O. 240 120  360    Blood Components 290   290    Shift Total 530 120  650   O  U  T  P  U  T   Urine 1075 400  1475    Shift Total 1075 400  1475   Weight (kg)            Admit       GENERAL APPEARANCE: NAD but on high flow NC   HEENT: anicteric, MMM  Pulmonary: lungs clear anteriorly   CV: regular rhythm, normal rate, no rub   - Edema - edema of ext x4 and dependent trunk spaces   GI: soft, nontender, normal bowel sounds, no HSM   MS: LEs bilat with healing ulcers with mild surround erythema of several,   : has Cee   SKIN: LEs bilat with venous stasis skin changes   NEURO: mentation intact and speech normal    LABS:   CMP  Recent Labs  Lab 17  0836 17  0607 17  0637 17  1410   NA  --  146* 147* 147*   POTASSIUM  --  4.6 4.8 4.6   CHLORIDE  --  114* 118* 118*   CO2  --  23 22 20   ANIONGAP  --  8 8 9   GLC  --  117* 189* 257*   BUN  --  39* 35* 30   CR  --  4.55* 4.42* 4.48*   GFRESTIMATED  --  9* 10* 10*   GFRESTBLACK  --  11* 12* 12*   MALICK  --  6.1* 6.1* 6.3*   PHOS  --  6.3*  --  4.8*   PROTTOTAL  --   --  5.2*  --    ALBUMIN  --  1.8* 1.8* 2.0*   BILITOTAL 0.2  --  0.3  --    ALKPHOS  --   --  292*  --    AST  --   --  34  --    ALT  --   --  80*  --      CBC  Recent Labs  Lab 17  1417 17  0836 17  0720 17  0607 17  0637   HGB 7.8* 6.8* 6.2* 6.7* 7.4*   WBC  --  9.4 8.7 10.0 12.9*   RBC  --  2.71* 2.47* 2.71* 2.94*   HCT  --  23.1* 20.8* 23.0* 24.8*   MCV  --  85 84 85 84   MCH  --  25.1* 25.1* 24.7* 25.2*   MCHC  --  29.4* 29.8* 29.1* 29.8*   RDW  --  22.3* 22.1* 22.2* 22.6*    PLT  --  212 196 226 219     INR  Recent Labs  Lab 08/27/17  1013 08/27/17  0836 08/26/17  0637   INR 1.25* Unsatisfactory specimen - clotted 1.11   PTT 35 Unsatisfactory specimen - clotted  --      ABG  Recent Labs  Lab 08/26/17  1617 08/26/17  0638   PH  --  7.27*   PCO2  --  44   PO2  --  104   HCO3  --  20*   O2PER 60.0 70.0      URINE STUDIES  Recent Labs   Lab Test  08/26/17   1250  08/22/17   1418  08/13/17   1136  08/12/17   1800   07/02/16   0224  02/05/13   1016  01/31/13   1210  01/22/13   0921   COLOR  Straw  Light Yellow  Light Red  Yellow   < >  Yellow  Yellow  Yellow  Yellow   APPEARANCE  Slightly Cloudy  Slightly Cloudy  Cloudy  Cloudy   < >  Clear  Clear  Clear  Clear   URINEGLC  Negative  150*  Negative  Negative   < >  Negative  >=1000*  >=1000*  500*   URINEBILI  Negative  Negative  Negative  Negative   < >  Negative  Negative  Negative  Negative   URINEKETONE  Negative  Negative  Negative  Negative   < >  Negative  Negative  Negative  Negative   SG  1.004  1.007  1.020  1.015   < >  1.020  1.020  1.025  1.020   UBLD  Negative  Trace*  Moderate*  Large*   < >  Trace*  Negative  Negative  Small*   URINEPH  5.0  5.0  5.5  5.5   < >  6.0  6.0  6.0  6.0   PROTEIN  Negative  10*  100*  100*   < >  Negative  Trace*  Trace*  30*   UROBILINOGEN   --    --    --    --    --   0.2  0.2  0.2  0.2   NITRITE  Negative  Negative  Negative  Negative   < >  Negative  Positive*  Positive*  Negative   LEUKEST  Moderate*  Large*  Large*  Large*   < >  Negative  Small*  Trace*  Moderate*   RBCU  5*  5-10*  28*  >182*   < >  2-5*  O - 2  2-5*  10-25*   WBCU  7*  25-50*  4649*  2568*   < >  O - 2  *  *  >100*    < > = values in this interval not displayed.     Recent Labs   Lab Test  08/22/17   1418  07/17/17   1107  06/21/17   0848  06/14/17   1455  06/01/17   2045   [facility]  1.24*  1.29*  0.89*  1.31*  2.11*     PTH  Recent Labs   Lab Test  07/17/17   1054  06/21/17   0842   PTHI  726*  567*     IRON  STUDIES  Recent Labs   Lab Test  08/22/17   1410  08/13/17   0552  07/17/17   1054  06/21/17   0842  06/14/17   1750  08/20/12   1146  06/07/12   1350   IRON  26*  20*  23*  36  26*  32*  15*   FEB  136*  112*  145*  209*  229*  285  323   IRONSAT  19  17  16  17  11*  11*  5*   GWEN  242  188  206  196  87   --   7*       IMAGING:  CXR 8/26  Impression:   1. New bilateral perihilar and basilar opacities representing  pulmonary edema or infection.  2. Bilateral pleural effusions with associated atelectasis or  consolidations.    Renal US 8/13  IMPRESSION:Somewhat echogenic appearance of the renal parenchyma  compatible with medical renal disease. No hydronephrosis.       Mike Medina MD

## 2017-08-27 NOTE — PLAN OF CARE
Problem: Goal Outcome Summary  Goal: Goal Outcome Summary  PT 6B: Cancel - PT orders acknowledged and appreciated. Pt not medically appropriate for evaluation, with low hgb and increased O2 needs with minimal movement. Will re-schedule and initiate as appropriate.

## 2017-08-28 ENCOUNTER — APPOINTMENT (OUTPATIENT)
Dept: PHYSICAL THERAPY | Facility: CLINIC | Age: 77
DRG: 291 | End: 2017-08-28
Payer: MEDICARE

## 2017-08-28 LAB
ANION GAP SERPL CALCULATED.3IONS-SCNC: 9 MMOL/L (ref 3–14)
BUN SERPL-MCNC: 40 MG/DL (ref 7–30)
C DIFF TOX B STL QL: NEGATIVE
CALCIUM SERPL-MCNC: 6.1 MG/DL (ref 8.5–10.1)
CHLORIDE SERPL-SCNC: 114 MMOL/L (ref 94–109)
CO2 SERPL-SCNC: 24 MMOL/L (ref 20–32)
COPATH REPORT: NORMAL
CREAT SERPL-MCNC: 4.98 MG/DL (ref 0.52–1.04)
CRYOGLOB SER QL: ABNORMAL %
ERYTHROCYTE [DISTWIDTH] IN BLOOD BY AUTOMATED COUNT: 21.4 % (ref 10–15)
GFR SERPL CREATININE-BSD FRML MDRD: 8 ML/MIN/1.7M2
GLUCOSE BLDC GLUCOMTR-MCNC: 132 MG/DL (ref 70–99)
GLUCOSE BLDC GLUCOMTR-MCNC: 140 MG/DL (ref 70–99)
GLUCOSE BLDC GLUCOMTR-MCNC: 185 MG/DL (ref 70–99)
GLUCOSE BLDC GLUCOMTR-MCNC: 188 MG/DL (ref 70–99)
GLUCOSE BLDC GLUCOMTR-MCNC: 215 MG/DL (ref 70–99)
GLUCOSE BLDC GLUCOMTR-MCNC: 95 MG/DL (ref 70–99)
GLUCOSE SERPL-MCNC: 94 MG/DL (ref 70–99)
HAPTOGLOB SERPL-MCNC: 122 MG/DL (ref 35–175)
HCT VFR BLD AUTO: 27.4 % (ref 35–47)
HGB BLD-MCNC: 8.1 G/DL (ref 11.7–15.7)
LACTATE BLD-SCNC: 1.3 MMOL/L (ref 0.7–2)
MAGNESIUM SERPL-MCNC: 1.6 MG/DL (ref 1.6–2.3)
MCH RBC QN AUTO: 25.5 PG (ref 26.5–33)
MCHC RBC AUTO-ENTMCNC: 29.6 G/DL (ref 31.5–36.5)
MCV RBC AUTO: 86 FL (ref 78–100)
PF4 HEPARIN CMPLX AB SER QL: ABNORMAL
PLATELET # BLD AUTO: 243 10E9/L (ref 150–450)
POTASSIUM SERPL-SCNC: 4.5 MMOL/L (ref 3.4–5.3)
RBC # BLD AUTO: 3.18 10E12/L (ref 3.8–5.2)
SODIUM SERPL-SCNC: 147 MMOL/L (ref 133–144)
SPECIMEN SOURCE: NORMAL
WBC # BLD AUTO: 8.2 10E9/L (ref 4–11)

## 2017-08-28 PROCEDURE — 85027 COMPLETE CBC AUTOMATED: CPT | Performed by: STUDENT IN AN ORGANIZED HEALTH CARE EDUCATION/TRAINING PROGRAM

## 2017-08-28 PROCEDURE — 12000006 ZZH R&B IMCU INTERMEDIATE UMMC

## 2017-08-28 PROCEDURE — 40000275 ZZH STATISTIC RCP TIME EA 10 MIN: Performed by: OPTOMETRIST

## 2017-08-28 PROCEDURE — 94799 UNLISTED PULMONARY SVC/PX: CPT

## 2017-08-28 PROCEDURE — 83605 ASSAY OF LACTIC ACID: CPT | Performed by: INTERNAL MEDICINE

## 2017-08-28 PROCEDURE — 94640 AIRWAY INHALATION TREATMENT: CPT | Mod: 76

## 2017-08-28 PROCEDURE — 93010 ELECTROCARDIOGRAM REPORT: CPT | Performed by: INTERNAL MEDICINE

## 2017-08-28 PROCEDURE — 97110 THERAPEUTIC EXERCISES: CPT | Mod: GP | Performed by: REHABILITATION PRACTITIONER

## 2017-08-28 PROCEDURE — 93005 ELECTROCARDIOGRAM TRACING: CPT

## 2017-08-28 PROCEDURE — 25000125 ZZHC RX 250: Performed by: STUDENT IN AN ORGANIZED HEALTH CARE EDUCATION/TRAINING PROGRAM

## 2017-08-28 PROCEDURE — 83735 ASSAY OF MAGNESIUM: CPT | Performed by: STUDENT IN AN ORGANIZED HEALTH CARE EDUCATION/TRAINING PROGRAM

## 2017-08-28 PROCEDURE — 80048 BASIC METABOLIC PNL TOTAL CA: CPT | Performed by: STUDENT IN AN ORGANIZED HEALTH CARE EDUCATION/TRAINING PROGRAM

## 2017-08-28 PROCEDURE — 40000275 ZZH STATISTIC RCP TIME EA 10 MIN

## 2017-08-28 PROCEDURE — S0171 BUMETANIDE 0.5 MG: HCPCS | Performed by: STUDENT IN AN ORGANIZED HEALTH CARE EDUCATION/TRAINING PROGRAM

## 2017-08-28 PROCEDURE — 25000132 ZZH RX MED GY IP 250 OP 250 PS 637: Mod: GY | Performed by: STUDENT IN AN ORGANIZED HEALTH CARE EDUCATION/TRAINING PROGRAM

## 2017-08-28 PROCEDURE — 00000146 ZZHCL STATISTIC GLUCOSE BY METER IP

## 2017-08-28 PROCEDURE — 40000193 ZZH STATISTIC PT WARD VISIT: Performed by: REHABILITATION PRACTITIONER

## 2017-08-28 PROCEDURE — 36415 COLL VENOUS BLD VENIPUNCTURE: CPT | Performed by: INTERNAL MEDICINE

## 2017-08-28 PROCEDURE — 97162 PT EVAL MOD COMPLEX 30 MIN: CPT | Mod: GP | Performed by: REHABILITATION PRACTITIONER

## 2017-08-28 PROCEDURE — 36415 COLL VENOUS BLD VENIPUNCTURE: CPT | Performed by: STUDENT IN AN ORGANIZED HEALTH CARE EDUCATION/TRAINING PROGRAM

## 2017-08-28 PROCEDURE — A9270 NON-COVERED ITEM OR SERVICE: HCPCS | Mod: GY | Performed by: STUDENT IN AN ORGANIZED HEALTH CARE EDUCATION/TRAINING PROGRAM

## 2017-08-28 PROCEDURE — 87493 C DIFF AMPLIFIED PROBE: CPT | Performed by: STUDENT IN AN ORGANIZED HEALTH CARE EDUCATION/TRAINING PROGRAM

## 2017-08-28 PROCEDURE — 40000047 ZZH STATISTIC CTO2 CONT OXYGEN TECH TIME EA 90 MIN: Performed by: OPTOMETRIST

## 2017-08-28 PROCEDURE — 99233 SBSQ HOSP IP/OBS HIGH 50: CPT | Mod: GC | Performed by: INTERNAL MEDICINE

## 2017-08-28 PROCEDURE — 97530 THERAPEUTIC ACTIVITIES: CPT | Mod: GP | Performed by: REHABILITATION PRACTITIONER

## 2017-08-28 PROCEDURE — 40000983 ZZH STATISTIC HFNC ADULT NON-CPAP: Performed by: OPTOMETRIST

## 2017-08-28 RX ORDER — BUMETANIDE 0.25 MG/ML
2 INJECTION INTRAMUSCULAR; INTRAVENOUS 2 TIMES DAILY
Status: DISCONTINUED | OUTPATIENT
Start: 2017-08-28 | End: 2017-08-31

## 2017-08-28 RX ORDER — METOPROLOL TARTRATE 25 MG/1
25 TABLET, FILM COATED ORAL 2 TIMES DAILY
Status: DISCONTINUED | OUTPATIENT
Start: 2017-08-28 | End: 2017-09-01 | Stop reason: HOSPADM

## 2017-08-28 RX ADMIN — CALCIUM ACETATE 667 MG: 667 CAPSULE ORAL at 16:57

## 2017-08-28 RX ADMIN — CALCIUM ACETATE 667 MG: 667 CAPSULE ORAL at 12:20

## 2017-08-28 RX ADMIN — IPRATROPIUM BROMIDE AND ALBUTEROL SULFATE 3 ML: .5; 3 SOLUTION RESPIRATORY (INHALATION) at 15:43

## 2017-08-28 RX ADMIN — METOPROLOL TARTRATE 25 MG: 25 TABLET ORAL at 18:48

## 2017-08-28 RX ADMIN — CALCIUM ACETATE 667 MG: 667 CAPSULE ORAL at 08:22

## 2017-08-28 RX ADMIN — IPRATROPIUM BROMIDE AND ALBUTEROL SULFATE 3 ML: .5; 3 SOLUTION RESPIRATORY (INHALATION) at 20:17

## 2017-08-28 RX ADMIN — ASPIRIN 81 MG: 81 TABLET, COATED ORAL at 08:22

## 2017-08-28 RX ADMIN — VITAMIN D, TAB 1000IU (100/BT) 2000 UNITS: 25 TAB at 08:22

## 2017-08-28 RX ADMIN — SODIUM BICARBONATE 650 MG TABLET 1300 MG: at 21:20

## 2017-08-28 RX ADMIN — OYSTER SHELL CALCIUM WITH VITAMIN D 1 TABLET: 500; 200 TABLET, FILM COATED ORAL at 08:22

## 2017-08-28 RX ADMIN — METOPROLOL TARTRATE 12.5 MG: 25 TABLET, FILM COATED ORAL at 07:16

## 2017-08-28 RX ADMIN — FERROUS SULFATE TAB 325 MG (65 MG ELEMENTAL FE) 325 MG: 325 (65 FE) TAB at 08:21

## 2017-08-28 RX ADMIN — PRAVASTATIN SODIUM 40 MG: 20 TABLET ORAL at 08:21

## 2017-08-28 RX ADMIN — Medication 1 CAPSULE: at 08:23

## 2017-08-28 RX ADMIN — BUMETANIDE 2 MG: 0.25 INJECTION INTRAMUSCULAR; INTRAVENOUS at 16:52

## 2017-08-28 RX ADMIN — IPRATROPIUM BROMIDE AND ALBUTEROL SULFATE 3 ML: .5; 3 SOLUTION RESPIRATORY (INHALATION) at 11:01

## 2017-08-28 RX ADMIN — PENTOXIFYLLINE 400 MG: 400 TABLET, FILM COATED, EXTENDED RELEASE ORAL at 08:22

## 2017-08-28 RX ADMIN — BUMETANIDE 2 MG: 0.25 INJECTION INTRAMUSCULAR; INTRAVENOUS at 04:01

## 2017-08-28 RX ADMIN — SODIUM BICARBONATE 650 MG TABLET 1300 MG: at 08:21

## 2017-08-28 RX ADMIN — INSULIN ASPART 1 UNITS: 100 INJECTION, SOLUTION INTRAVENOUS; SUBCUTANEOUS at 12:21

## 2017-08-28 RX ADMIN — SODIUM BICARBONATE 650 MG TABLET 1300 MG: at 13:54

## 2017-08-28 NOTE — PLAN OF CARE
Problem: Goal Outcome Summary  Goal: Goal Outcome Summary  Outcome: Therapy, progress toward functional goals as expected  6B PT- Eval completed and treatment initiated. Pt on 60% FiO2 via HFNC. Burak for bed mobility with log roll technique instructed, CGA sit<>stand, Burak for stand pivot transfer EOB>chair. Upon sitting at EOB, pt had increasingly productive cough that limited remainder of session. In sitting, O2 sats decreased to 84%, increased FiO2 to 70%, sats remained at mid-high 90s for remainder. Pt limited by proximal weakness, respiratory status, and decreased activity tolerance. Recommend discharge to TCU to progress independence with functional mobility.

## 2017-08-28 NOTE — PROGRESS NOTES
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 Multiple provocation from infection (cellulitis/ PNA/UTI) and was in the ICU before for respiratory distress. Cr on this admission was 4.4 similar to last discharge Cr, however unclear if this could be MARTÍN from a recovered Cr. Of note, patient was not discharged with diuretics. Less suspicous of PNA given no fever, no productive sputum, negative procal and diffuse B-line on bedside U/S. FEUrea supports pre-renal  - Stop all antibiotics, continue to monitor for fever  - IV bumex intermittent dosing targeting goal negative 1 L per day  - continue high flow nasal canula - ween as tolerated  - Daily I/O, weight, BMP, avoid nephrotoxic agents  - Cee's in because of urinary retention after getting diuresis  - bedside US today to establish fluid status     # Anemia  Hemoglobin found to be in 6s on 8/27. No current signs or symptoms of bleeding. Vitals are stable. Transfused 1 unit PRBCs on 8/27 and Hemoglobin came up to 7.8. Hemolysis labs negative.  - monitor CBC  - ordered peripheral smear before transfusion.     # Afib s/p ablation 6/2017   Atrial fibrillation with RVR noted on telemetry 8/28 - confirmed on EKG. Asymptomatic and hemodynamically stable. Was followed by cardiology determined okay to be off warfarin. No issues since ablation - aggressive diuresis this admission could  have caused decreased intravascular volume and precipitated the event.  - restart PTA dosing of metoprolol  - diuresis as above  - continue telemetry     # CKD-BMD, secondary hyperPTH, VitaminD deficient  - continue phoslo, vitaminD, calcium carbonate  - last ferritin 242, continue ferrous supplement     # HTN  Needing diuresis; thus will decrease dosing. Was on decreased dose of metoprolol  - hold amlodipine  - continue PTA metoprolol 25      # DM2  Diet control, likely because of deteriorating renal function. A1c 6/22/2017 = 7.3  - Low SSI, hypoglycemia protocol  - POCT q4 while NPO     # Chronic venous ulcer   W/u in last admission, not appear to be infected  - [facility] nurse consult  - Vascular surgery follow-up for outpatient TCO2 study, Dermatology f/u in 4-6 weeks  - Continue pentoxifylline       Chronic problems  # Vertical Diplopia: no change currently  # CAD s/p CABG : continue ASA, pracastatin  # Chronic diarrhea : check c diff ordered 8/28 for loose stool  # Gluteal cyst : stable, no I&D per last surgery     FEN: renal diet  DVT Prophylaxis:  SCD  Disposition: PT/OT, likely same TCU  Code Status: FULL    Patient staffed with Dr. Epperson who agrees with above plan    Gilda Mejia MD  Internal Medicine & Pediatrics PGY1  [place] [facility]  Pager: [phone]    ___________________________________________________________________    Subjective & Interval Hx:    No acute events overnight. Notes she feels well today. Shortness of breath is improving. Continuing to diurese. Flipped into atrial fibrillation with RVR this morning confirmed on EKG. Asymptomatic and hemodynamically stable. Has history of a fib s/p ablation in 6/2017. No signs or symptoms of bleeding. Hemoglobin is stable.    Last 24 hr care team notes reviewed.   ROS:  4 point ROS including Respiratory, CV, GI and  (other than that noted in the HPI) is negative    Medications:  Reviewed in EPIC. List below for reference    Physical Exam:  Blood pressure  134/85, pulse 78, temperature 98.1  F (36.7  C), temperature source Axillary, resp. rate 22, weight 84.1 kg (185 lb 6.5 oz), SpO2 97 %.    I/O last 3 completed shifts:  In: 860 [P.O.:570]  Out: 3900 [Urine:3900]    General: AAOx3, pleasantly interactive  HEENT:  PERRL, EOMI, MMM   Cardiac: tachycardic with irregular rhythm. Normal S1, S2.  Pulm: occasional wheezes,  Bilateral inspiratory crackles.  Abd: +BS, soft, non distended, non tender. No hepatosplenomegaly.  Skin: No rash  MSK: No deformities, no joint swelling, edema at legs improving   Neuro: CN grossly intact, no focal deficits  Psych: normal mood, full affect    Labs & Studies of Note:   Labs and studies reviewed in [facility]    Unresulted Labs Ordered in the Past 30 Days of this Admission     Date and Time Order Name Status Description    8/27/2017 0944 Heparin induced thrombocytopenia In process     8/27/2017 0813 Blood Morphology Pathologist Review In process     8/26/2017 0715 Blood culture Preliminary     8/26/2017 0715 Blood culture Preliminary     8/22/2017 1410 CRYOGLOBULIN QUANTITATIVE In process     8/12/2017 1800 ILA Preliminary     8/12/2017 1800 ILA Preliminary           Medications list for Reference:   Current Facility-Administered Medications   Medication     EPINEPHrine (ADRENALIN) kit 0.3 mg     diphenhydrAMINE (BENADRYL) injection 50 mg     methylPREDNISolone sodium succinate (solu-MEDROL) injection 125 mg     ranitidine (ZANTAC) injection 50 mg     iron sucrose (VENOFER) 200 mg in NaCl 0.9 % 100 mL intermittent infusion     acetaminophen (TYLENOL) tablet 650 mg     aspirin EC EC tablet 81 mg     calcium acetate (PHOSLO) capsule 667 mg     calcium carb 1250 mg (500 mg [place])/vitamin D 200 units (OSCAL with D) per tablet 1 tablet     cholecalciferol (vitamin D) tablet 2,000 Units     ferrous sulfate (IRON) tablet 325 mg     lactobacillus rhamnosus (GG) (CULTURELL) capsule 1 capsule     pentoxifylline (TRENtal) CR tablet 400 mg     pravastatin  (PRAVACHOL) tablet 40 mg     sodium bicarbonate tablet 1,300 mg     naloxone (NARCAN) injection 0.1-0.4 mg     senna-docusate (SENOKOT-S;PERICOLACE) 8.6-50 MG per tablet 1-2 tablet     ondansetron (ZOFRAN-ODT) ODT tab 4 mg    Or     ondansetron (ZOFRAN) injection 4 mg     prochlorperazine (COMPAZINE) injection 5 mg    Or     prochlorperazine (COMPAZINE) tablet 5 mg    Or     prochlorperazine (COMPAZINE) Suppository 12.5 mg     glucose 40 % gel 15-30 g    Or     dextrose 50 % injection 25-50 mL    Or     glucagon injection 1 mg     insulin aspart (NovoLOG) inj (RAPID ACTING)     insulin aspart (NovoLOG) inj (RAPID ACTING)     metoprolol (LOPRESSOR) half-tab 12.5 mg     ipratropium - albuterol 0.5 mg/2.5 mg/3 mL (DUONEB) neb solution 3 mL     ipratropium - albuterol 0.5 mg/2.5 mg/3 mL (DUONEB) neb solution 3 mL

## 2017-08-28 NOTE — CONSULTS
"77 year old female presenting with cough and sob. CORE consult requested. Kathryn has a history of HFpEF.      Kathryn was provided with a CHF book.  I reviewed the importance of daily weights, 2 gm sodium diet, 2L fluid restriction and compliance with medications upon hospital discharge.  CORE contact phone numbers provided and patient is encouraged to call with any questions or concerns, including any weight gain or loss of 2 or more pounds in 24 hours or 5 or more pounds in 1 week.      Appointment made in CORE clinic TBD.   I will follow with patient and when we get closer to a discharge date, we will schedule a follow up appointment.     Melinda Farooq RN BSN   Cardiology Care Coordinator - C.O.R.[facility]  Questions and [phone]  First press #1 for the [place] and then press #3 for \"Medical Advise\" to reach us Cardiology Nurses.      "

## 2017-08-28 NOTE — PLAN OF CARE
Problem: Goal Outcome Summary  Goal: Goal Outcome Summary  Outcome: Declining  Pt is alert and oriented x4; intermittently forgetful; falls asleep frequently. BPs stable today; pt now in atrial fib RVR rates from 90s - 170s; maintaining mostly <130. HFNC 60% - 80%; afebrile. Coarse lung sounds bilaterally. Weak but productive cough; using acapella and finding relief with secretion removal. Hyperactive bowel sounds; large loose BM x1; C. Diff negative. Cee in place; good UOP. OK appetite. Will continue to assess and monitor.

## 2017-08-28 NOTE — PROGRESS NOTES
[facility]  Patient is currently open to home care services with [facility].  The patient was receiving RN and telemonitoring services before previous hospitalization and admission to TCU.  [facility]  and team have been notified of patient admission.  [facility] liaison will continue to follow patient during stay.  If appropriate provide orders to resume home care at time of discharge.    Kathryn Mcnulty RN, BSN  [facility] Liaison  [phone]

## 2017-08-28 NOTE — PROVIDER NOTIFICATION
[facility] intern Dr. Amanda notified of atrial flutter with a block 130s - 140s. Set of vitals taken; /79. Pt is asymptomatic; no c/o SOB or chest pain. 8am oral metoprolol given. Stat EKG ordered. Provider present at bedside for assessment.

## 2017-08-28 NOTE — PROGRESS NOTES
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 Per patient, she was not discharged on lasix and has been doing fine until 1-2 days ago when she started to feel more SOB and coughing more. Cough out dry sputum without blood. Also noticed not urinating as much as prior. Her SOB is on every activities and could not lie down flat. She also endorsed swelling of her UE and LE. Upon asking about dysuria, she denied having burning urination and also stated that she did not has these symptoms in prior infection too. However, in prior notes, it was stated that she did have dysuria.         Moisture Management:  Diaper and wound care dressing     Current Diet / Nutrition:         Active Diet Order      Renal Diet (non-dialysis)           Allan Assessment and sub scores:   Allan Score  Av.4  Min: 17  Max: 19     Labs:    Recent Labs   Lab Test  17   0658   17   1013   17   0607   17   0552   17   0658   ALBUMIN   --    --    --    --   1.8*   < >   --    < >   --    HGB  8.1*   < >   --    < >  6.7*   < >  7.3*   < >   --    INR   --    --   1.25*   < >   --    < >   --    < >   --    WBC  8.2   --    --    < >  10.0   < >  7.0   < >   --    A1C   --    --    --    --    --    --    --    --   7.3*   CRP   --    --    --    --    --    --   30.0*  30.0*   < >   --     < > = values in this interval not displayed.     Wound Assessment (location #1Bilateral lower extremity):  Wound  History:              Wound Base: dermis, dry and non-granulation    Specific Dimensions (length x width x depth, in cm) :      Tunneling:  N/A    Undermining: N/A    Palpation of the wound bed:  Firm    Slough appearance:  none    Eschar appearance:  none    Periwound Skin: edema and erythema,      Color: red    Temperature  warm    Drainage:  . Amount: scant . Color: serosanguinous     Odor: mild    Pain:  moderate , throbing          Intervention:     Patient's chart evaluated.      Wound(s) was fully assessed    Wound Care: was not done:      Orders  Written    Supplies  Ordered: MediHoney  Discussed plan of care with Patient         Assessment:       Bilateral lower extremity chronic open wounds of unknown origin         Plan:     Nursing to notify the Provider(s) and re-consult the WOC Nurse if wound(s) deteriorate(s).  Plan of care for wound located on bilateral lower extermity: Cleanse the wound with MicroKlenz moisten gauze pat dry.Apply a nickel size thickness of the mediHoney gel to the open wound base and cover with Mepilex 4x4 boarder and change the dressing every other day and as needed     WOC Nurse will return: kerry     Face to face time: 20 Minutes

## 2017-08-28 NOTE — PLAN OF CARE
Problem: Goal Outcome Summary  Goal: Goal Outcome Summary  Outcome: No Change  Neuro: A&Ox4. Intermittently confused upon waking and intermittently talks in sleep.    Cardiac: SR. VSS. Afebrile      Respiratory: Sating > 95% HFNC % FiO2.    GI/: Adequate urine output via moreira.  Bumex given x 1.  Loose BM X2  Diet/appetite: Tolerating renal diet with 1000 mL FR that was discontinued this afternoon. Eating well.  Activity:  Weight shift and repositioning q 2.  Pain: At acceptable level on current regimen.  Denies.  Skin: Intact, no new deficits noted.  LDA's:  2 R PIV saline locked.  Pt received one unit of blood this am.  Pt tolerated well.  Subsequent Hgb 7.8  Iron infusion initiated at 1800.  Infused approximately 25 mL of 100 mL when patient reported the room was spinning and SOB and just just feeling funny.  Infusion stopped.  Team notified and infusion discontinued.       Plan: Continue with POC. Notify primary team with changes.

## 2017-08-28 NOTE — PLAN OF CARE
Problem: Goal Outcome Summary  Goal: Goal Outcome Summary  Outcome: No Change  /81 (BP Location: Right arm)  Pulse 78  Temp 97.8  F (36.6  C) (Axillary)  Resp 20  Wt 84.1 kg (185 lb 6.5 oz)  SpO2 99%  BMI 31.83 kg/m2     Neuro: A&Ox4. Forgetfulness when waken from sleep.   Cardiac: SR. VSS.       Respiratory: Sating % on BIPAP 65 FiO2, Sating 92-97% on HFNC  FiO2. Frequent productive cough. Course crackles throughout LS.   GI/: Adequate urine output per moreira. BM x1.  Diet/appetite: Tolerating renal diet. Eating well.  Activity:  Q2h reposition.    Pain: Denies any pain.    Skin: Intact, no new deficits noted.  LDA's: 2 R PIV. Saline locked.      Plan: Continue with POC. Notify primary team with changes.

## 2017-08-28 NOTE — PROGRESS NOTES
08/28/17 1515   Quick Adds   Type of Visit Initial PT Evaluation   Living Environment   Lives With grandchild(zoe);spouse   Living Arrangements house   Home Accessibility bed and bath on same level;grab bars present (bathtub)   Number of Stairs to Enter Home 0   Number of Stairs Within Home 0   Stair Railings at Home none   Transportation Available family or friend will provide  (granddaughter)   Living Environment Comment Pt reports she lives with her , granddaughter, and 2 others, however has been at [facility] for 1 week prior to current hospitalization. She reports that someone is usually available to assist her at home, however not at all times of day.   Self-Care   Dominant Hand right   Usual Activity Tolerance fair   Current Activity Tolerance poor   Regular Exercise no   Equipment Currently Used at Home walker, rolling;dressing device;grab bar   Activity/Exercise/Self-Care Comment Pt has been receiving PT and OT at [facility] prior to hospitalization, however reports no regular exercise prior. She walks household distances with FWW.   Functional Level Prior   Ambulation 1-->assistive equipment   Transferring 1-->assistive equipment   Toileting 1-->assistive equipment   Bathing 1-->assistive equipment   Dressing 1-->assistive equipment   Eating 0-->independent   Communication 0-->understands/communicates without difficulty   Swallowing 0-->swallows foods/liquids without difficulty   Cognition 1 - attention or memory deficits   Fall history within last six months no   Which of the above functional risks had a recent onset or change? 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, DM2 not on medication, Afib s/p ablation not on warfarin, HTN, CAD s/p CABGx5, HFpEF (EF 60-65%, Aug2017), Chronic venous stasis ulcer, Gluteal cyst, presenting from TCU with progressive SOB and cough x 2 days   Precautions/Limitations fall precautions   General Observations Pt supine upon arrival, placed on 60% HFNC   Cognitive Status Examination   Orientation orientation to person, place and time   Level of Consciousness alert   Follows Commands and Answers Questions 100% of the time   Personal Safety and Judgment intact   Memory intact   Pain Assessment   Patient Currently in Pain No   Integumentary/Edema   Integumentary/Edema Comments Small wounds/lacerations on B anterior lower LEs   Posture    Posture Forward head position;Protracted shoulders;Kyphosis   Range of Motion (ROM)   ROM Comment No deficits identified in B hips, knees, ankles   Strength   Strength Comments 4-/5 in B hips, knees, ankles   Bed Mobility   Bed Mobility Comments Burak for supine>sit at EOB with HOB slightly elevated and use of rail   Transfer Skills   Transfer Comments CGA for sit>stand at EOB; Burak for stand pivot transfer from EOB>high back chair with B UE support on therapist's forearm   Gait   Gait Comments Not assessed at this date, pt declines due to worsening cough   Balance   Balance Comments CGA and B UE support on bed for seated balance at EOB due to mild posterolateral lean; standing balance not formally assessed, however Burak and B UE support for stand pivot transfer due to mild unsteadiness   Sensory Examination   Sensory Perception no deficits were identified   Sensory Perception Comments Pt correctly identified 5/5 light touches on B LEs   Coordination   Coordination Comments Not formally assessed   Muscle Tone   Muscle Tone Comments Not formally assessed  "  General Therapy Interventions   Planned Therapy Interventions balance training;bed mobility training;gait training;neuromuscular re-education;strengthening;progressive activity/exercise   Clinical Impression   Criteria for Skilled Therapeutic Intervention yes, treatment indicated   PT Diagnosis impaired functional mobility   Influenced by the following impairments decreased strength, balance, posture, activity tolerance   Functional limitations due to impairments Zara for bed mobility and transfers   Clinical Presentation Evolving/Changing   Clinical Presentation Rationale PMH    Clinical Decision Making (Complexity) Moderate complexity   Therapy Frequency` daily   Predicted Duration of Therapy Intervention (days/wks) 9/4/2017   Anticipated Discharge Disposition Transitional Care Facility   Risk & Benefits of therapy have been explained Yes   Patient, Family & other staff in agreement with plan of care Yes   Clinical Impression Comments Pt previously at TCU prior to current hospital admission and will likely be safe discharge disposition, pt in agreement.   [facility] [employer] TM \"6 Clicks\"   2016, Trustees of [facility], under license to [employer].  All rights reserved.   6 Clicks Short Forms Basic Mobility Inpatient Short Form   [facility]  \"6 Clicks\" V.2 Basic Mobility Inpatient Short Form   1. Turning from your back to your side while in a flat bed without using bedrails? 3 - A Little   2. Moving from lying on your back to sitting on the side of a flat bed without using bedrails? 3 - A Little   3. Moving to and from a bed to a chair (including a wheelchair)? 3 - A Little   4. Standing up from a chair using your arms (e.g., wheelchair, or bedside chair)? 3 - A Little   5. To walk in hospital room? 2 - A Lot   6. Climbing 3-5 steps with a railing? 2 - A Lot   Basic Mobility Raw Score (Score out of 24.Lower scores equate to lower levels of function) 16   Total Evaluation Time   Total " Evaluation Time (Minutes) 10

## 2017-08-29 ENCOUNTER — PRE VISIT (OUTPATIENT)
Dept: CARDIOLOGY | Facility: CLINIC | Age: 77
End: 2017-08-29

## 2017-08-29 ENCOUNTER — APPOINTMENT (OUTPATIENT)
Dept: OCCUPATIONAL THERAPY | Facility: CLINIC | Age: 77
DRG: 291 | End: 2017-08-29
Payer: MEDICARE

## 2017-08-29 ENCOUNTER — APPOINTMENT (OUTPATIENT)
Dept: PHYSICAL THERAPY | Facility: CLINIC | Age: 77
DRG: 291 | End: 2017-08-29
Payer: MEDICARE

## 2017-08-29 DIAGNOSIS — I50.30 DIASTOLIC HEART FAILURE (H): Primary | ICD-10-CM

## 2017-08-29 LAB
ANION GAP SERPL CALCULATED.3IONS-SCNC: 9 MMOL/L (ref 3–14)
BUN SERPL-MCNC: 41 MG/DL (ref 7–30)
CALCIUM SERPL-MCNC: 6 MG/DL (ref 8.5–10.1)
CHLORIDE SERPL-SCNC: 113 MMOL/L (ref 94–109)
CO2 SERPL-SCNC: 27 MMOL/L (ref 20–32)
CREAT SERPL-MCNC: 4.95 MG/DL (ref 0.52–1.04)
ERYTHROCYTE [DISTWIDTH] IN BLOOD BY AUTOMATED COUNT: 21.5 % (ref 10–15)
GFR SERPL CREATININE-BSD FRML MDRD: 9 ML/MIN/1.7M2
GLUCOSE BLDC GLUCOMTR-MCNC: 123 MG/DL (ref 70–99)
GLUCOSE BLDC GLUCOMTR-MCNC: 159 MG/DL (ref 70–99)
GLUCOSE BLDC GLUCOMTR-MCNC: 231 MG/DL (ref 70–99)
GLUCOSE BLDC GLUCOMTR-MCNC: 260 MG/DL (ref 70–99)
GLUCOSE BLDC GLUCOMTR-MCNC: 304 MG/DL (ref 70–99)
GLUCOSE SERPL-MCNC: 113 MG/DL (ref 70–99)
HCT VFR BLD AUTO: 26.8 % (ref 35–47)
HGB BLD-MCNC: 8 G/DL (ref 11.7–15.7)
INTERPRETATION ECG - MUSE: NORMAL
MAGNESIUM SERPL-MCNC: 1.7 MG/DL (ref 1.6–2.3)
MCH RBC QN AUTO: 25.8 PG (ref 26.5–33)
MCHC RBC AUTO-ENTMCNC: 29.9 G/DL (ref 31.5–36.5)
MCV RBC AUTO: 87 FL (ref 78–100)
PLATELET # BLD AUTO: 250 10E9/L (ref 150–450)
POTASSIUM SERPL-SCNC: 4.2 MMOL/L (ref 3.4–5.3)
RBC # BLD AUTO: 3.1 10E12/L (ref 3.8–5.2)
SODIUM SERPL-SCNC: 149 MMOL/L (ref 133–144)
WBC # BLD AUTO: 6.3 10E9/L (ref 4–11)

## 2017-08-29 PROCEDURE — 40000193 ZZH STATISTIC PT WARD VISIT: Performed by: REHABILITATION PRACTITIONER

## 2017-08-29 PROCEDURE — 40000047 ZZH STATISTIC CTO2 CONT OXYGEN TECH TIME EA 90 MIN

## 2017-08-29 PROCEDURE — 25000125 ZZHC RX 250: Performed by: STUDENT IN AN ORGANIZED HEALTH CARE EDUCATION/TRAINING PROGRAM

## 2017-08-29 PROCEDURE — 40000133 ZZH STATISTIC OT WARD VISIT

## 2017-08-29 PROCEDURE — 94640 AIRWAY INHALATION TREATMENT: CPT | Mod: 76

## 2017-08-29 PROCEDURE — 83735 ASSAY OF MAGNESIUM: CPT | Performed by: STUDENT IN AN ORGANIZED HEALTH CARE EDUCATION/TRAINING PROGRAM

## 2017-08-29 PROCEDURE — 94799 UNLISTED PULMONARY SVC/PX: CPT

## 2017-08-29 PROCEDURE — 36415 COLL VENOUS BLD VENIPUNCTURE: CPT | Performed by: STUDENT IN AN ORGANIZED HEALTH CARE EDUCATION/TRAINING PROGRAM

## 2017-08-29 PROCEDURE — 97535 SELF CARE MNGMENT TRAINING: CPT | Mod: GO

## 2017-08-29 PROCEDURE — 97530 THERAPEUTIC ACTIVITIES: CPT | Mod: GP | Performed by: REHABILITATION PRACTITIONER

## 2017-08-29 PROCEDURE — 97110 THERAPEUTIC EXERCISES: CPT | Mod: GP | Performed by: REHABILITATION PRACTITIONER

## 2017-08-29 PROCEDURE — 94640 AIRWAY INHALATION TREATMENT: CPT

## 2017-08-29 PROCEDURE — 80048 BASIC METABOLIC PNL TOTAL CA: CPT | Performed by: STUDENT IN AN ORGANIZED HEALTH CARE EDUCATION/TRAINING PROGRAM

## 2017-08-29 PROCEDURE — 40000275 ZZH STATISTIC RCP TIME EA 10 MIN

## 2017-08-29 PROCEDURE — 40000983 ZZH STATISTIC HFNC ADULT NON-CPAP

## 2017-08-29 PROCEDURE — 94660 CPAP INITIATION&MGMT: CPT

## 2017-08-29 PROCEDURE — 00000146 ZZHCL STATISTIC GLUCOSE BY METER IP

## 2017-08-29 PROCEDURE — 25000132 ZZH RX MED GY IP 250 OP 250 PS 637: Mod: GY | Performed by: STUDENT IN AN ORGANIZED HEALTH CARE EDUCATION/TRAINING PROGRAM

## 2017-08-29 PROCEDURE — 99233 SBSQ HOSP IP/OBS HIGH 50: CPT | Mod: GC | Performed by: INTERNAL MEDICINE

## 2017-08-29 PROCEDURE — A9270 NON-COVERED ITEM OR SERVICE: HCPCS | Mod: GY | Performed by: STUDENT IN AN ORGANIZED HEALTH CARE EDUCATION/TRAINING PROGRAM

## 2017-08-29 PROCEDURE — S0171 BUMETANIDE 0.5 MG: HCPCS | Performed by: STUDENT IN AN ORGANIZED HEALTH CARE EDUCATION/TRAINING PROGRAM

## 2017-08-29 PROCEDURE — 85027 COMPLETE CBC AUTOMATED: CPT | Performed by: STUDENT IN AN ORGANIZED HEALTH CARE EDUCATION/TRAINING PROGRAM

## 2017-08-29 PROCEDURE — 97165 OT EVAL LOW COMPLEX 30 MIN: CPT | Mod: GO

## 2017-08-29 PROCEDURE — 12000008 ZZH R&B INTERMEDIATE UMMC

## 2017-08-29 RX ADMIN — ASPIRIN 81 MG: 81 TABLET, COATED ORAL at 10:12

## 2017-08-29 RX ADMIN — METOPROLOL TARTRATE 25 MG: 25 TABLET ORAL at 10:12

## 2017-08-29 RX ADMIN — FERROUS SULFATE TAB 325 MG (65 MG ELEMENTAL FE) 325 MG: 325 (65 FE) TAB at 10:11

## 2017-08-29 RX ADMIN — BUMETANIDE 2 MG: 0.25 INJECTION INTRAMUSCULAR; INTRAVENOUS at 15:22

## 2017-08-29 RX ADMIN — INSULIN ASPART 1 UNITS: 100 INJECTION, SOLUTION INTRAVENOUS; SUBCUTANEOUS at 17:58

## 2017-08-29 RX ADMIN — Medication 1 CAPSULE: at 10:12

## 2017-08-29 RX ADMIN — OYSTER SHELL CALCIUM WITH VITAMIN D 1 TABLET: 500; 200 TABLET, FILM COATED ORAL at 10:17

## 2017-08-29 RX ADMIN — METOPROLOL TARTRATE 25 MG: 25 TABLET ORAL at 19:53

## 2017-08-29 RX ADMIN — BUMETANIDE 2 MG: 0.25 INJECTION INTRAMUSCULAR; INTRAVENOUS at 10:11

## 2017-08-29 RX ADMIN — SODIUM BICARBONATE 650 MG TABLET 1300 MG: at 19:56

## 2017-08-29 RX ADMIN — SODIUM BICARBONATE 650 MG TABLET 1300 MG: at 15:22

## 2017-08-29 RX ADMIN — PRAVASTATIN SODIUM 40 MG: 20 TABLET ORAL at 10:17

## 2017-08-29 RX ADMIN — CALCIUM ACETATE 667 MG: 667 CAPSULE ORAL at 11:52

## 2017-08-29 RX ADMIN — CALCIUM ACETATE 667 MG: 667 CAPSULE ORAL at 17:57

## 2017-08-29 RX ADMIN — IPRATROPIUM BROMIDE AND ALBUTEROL SULFATE 3 ML: .5; 3 SOLUTION RESPIRATORY (INHALATION) at 14:39

## 2017-08-29 RX ADMIN — VITAMIN D, TAB 1000IU (100/BT) 2000 UNITS: 25 TAB at 10:13

## 2017-08-29 RX ADMIN — SODIUM BICARBONATE 650 MG TABLET 1300 MG: at 10:11

## 2017-08-29 RX ADMIN — IPRATROPIUM BROMIDE AND ALBUTEROL SULFATE 3 ML: .5; 3 SOLUTION RESPIRATORY (INHALATION) at 08:13

## 2017-08-29 RX ADMIN — INSULIN ASPART 1 UNITS: 100 INJECTION, SOLUTION INTRAVENOUS; SUBCUTANEOUS at 11:52

## 2017-08-29 RX ADMIN — CALCIUM ACETATE 667 MG: 667 CAPSULE ORAL at 10:17

## 2017-08-29 RX ADMIN — PENTOXIFYLLINE 400 MG: 400 TABLET, FILM COATED, EXTENDED RELEASE ORAL at 10:17

## 2017-08-29 ASSESSMENT — ACTIVITIES OF DAILY LIVING (ADL): PREVIOUS_RESPONSIBILITIES: MEAL PREP;HOUSEKEEPING;LAUNDRY;YARDWORK;MEDICATION MANAGEMENT;FINANCES

## 2017-08-29 NOTE — PROGRESS NOTES
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      # MARTÍN on CKD  # Hypervolemia 2/2 to HFpEF  Cardiorenal with fluid overload. Cr as of January 2017 was 1.2 but has been admitted with MARTÍN on CKI for 7 times in the past 2 months. Multiple provocations from infection (cellulitis/ PNA/UTI) and was in the ICU before for respiratory distress. At recent nephrology appointment, was having diarrhea and was thought to have fluid deficit; was encouraged to increase PO intake. However, continued with increased PO intake despite diarrhea resolution. Less suspicious of PNA: no fever, not producing sputum, negative procal and diffuse B-line on bedside U/S. FEUrea supports pre-renal. JVP still ~11 on exam 8/29.   - Discontinued antibiotics, will continue to monitor for fever  - IV bumex intermittent dosing targeting goal negative 1-2L per day  - continue supplemental O2, wean as tolerated  - Daily I/O, weight, BMP, avoid nephrotoxic agents  - Cee due to urinary retention in setting of diuresis  - CORE clinic referral completed  - consider cards consult  - likely PO diuretics at discharge with close follow up with PCP and mephrology  - consider venous mapping for fistula planning    # Anemia  Hemoglobin found to be in 6s on 8/27. No current signs or symptoms of bleeding. Vitals are stable. Transfused 1 unit PRBCs on 8/27 with proper response in hemoglobin. Hgb 8.0 (stable from prior) 8/29. Hemolysis labs negative.  - continue to monitor  - venofer 200mg IV QD x5  - continue PO  "ferrous sulfate 325mg QD following venofer  - consider EPO in outpt setting through nephrology f/u  - chronic Hgb goal 10    # Afib s/p ablation 6/2017   Atrial fibrillation with RVR noted on telemetry 8/28 - confirmed on EKG. Asymptomatic and hemodynamically stable. Was followed by cardiology and determined okay to be off warfarin. No issues since ablation - possibly due to stretch from hypervolemia. Converted into NSR overnight.   - on PTA metoprolol  - continued diuresis as above  - continue telemetry      # CKD-BMD, secondary hyperPTH, VitaminD deficient  - continue phoslo, vitaminD, calcium carbonate  - last ferritin 242, continue ferrous supplement     # HTN  Was on decreased dose of metoprolol. MAPS have been creeping up over admission in setting of holding PTA amlodipine.   - continue PTA metoprolol 25   - restart amlodipine 8/30      # Decreased acuity of hearing on left  Likely secondary to eustachian tube dysfunction, in setting of nasal congestion. Complains of difficulty hearing on left, acute change overnight. Clear fluid present on examination behind TM on left, no s/s infection; exam otherwise benign. No visual changes, pain.  - Continue to monitor  - Consider nasal fluticasone if persists    # DM2  Diet control, likely because of deteriorating renal function. 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 However, platelets 212 on same date, and have been within the range of normal for this entire admission (250 on 8/29). No new thrombosis, no skin necrosis, and no systemic reaction documented; unclear rationale for why HIT lab was ordered. Based on " this, would not have concerns about giving heparin in the future.    Chronic problems  # Vertical Diplopia: no change currently  # CAD s/p CABG : continue ASA, pravastatin  # Chronic diarrhea : check c diff ordered 8/28 for loose stool  # Gluteal cyst : stable, no I&D per last surgery     FEN: renal diet  DVT Prophylaxis:  SCD  Disposition: PT/OT, likely return to TCU  Code Status: FULL    Patient discussed with attending physician Dr. Zhao, who agrees with the above assessment and plan.     Magan Gould MD, RUDDY  PGY-1, Internal Medicine  [place] [facility]  Pager: [phone]  ___________________________________________________________________    Subjective & Interval Hx:    No acute events overnight. Notes she feels well today; shortness of breath continues to improve. Flipped into atrial fibrillation with RVR this morning confirmed on EKG. Asymptomatic and hemodynamically stable. No signs or symptoms of bleeding. Hemoglobin is stable.    Last 24 hr care team notes reviewed.   ROS:  4 point ROS including Respiratory, CV, GI and  (other than that noted in the HPI) is negative    Medications:  Reviewed in EPIC. List below for reference    Physical Exam:  Blood pressure 142/76, pulse 125, temperature 97.8  F (36.6  C), temperature source Oral, resp. rate 20, weight 82.3 kg (181 lb 7 oz), SpO2 95 %.    I/O last 3 completed shifts:  In: 1140 [P.O.:1140]  Out: 2855 [Urine:2855]    General: AAOx3, pleasantly interactive  HEENT:  PERRL, EOMI, MMM. Otoscopic examination reveals small amount of clear fluid behind TM on left; TM not distended. No erythema on TM or in canal.   Cardiac: RRR, Normal S1, S2.  Pulm: occasional wheezes,  Bilateral crackles, R>L  Skin: No rash  MSK: no joint swelling, edema at legs improving   Neuro: CNII-XII intact, no deficits other than subjectively diminished hearing on left  Psych: normal mood, full affect    Labs & Studies of Note:   Labs and studies reviewed in EPIC    Medications list for  Reference:   Current Facility-Administered Medications   Medication     metoprolol (LOPRESSOR) tablet 25 mg     bumetanide (BUMEX) injection 2 mg     EPINEPHrine (ADRENALIN) kit 0.3 mg     diphenhydrAMINE (BENADRYL) injection 50 mg     methylPREDNISolone sodium succinate (solu-MEDROL) injection 125 mg     ranitidine (ZANTAC) injection 50 mg     acetaminophen (TYLENOL) tablet 650 mg     aspirin EC EC tablet 81 mg     calcium acetate (PHOSLO) capsule 667 mg     calcium carb 1250 mg (500 mg [place])/vitamin D 200 units (OSCAL with D) per tablet 1 tablet     cholecalciferol (vitamin D) tablet 2,000 Units     ferrous sulfate (IRON) tablet 325 mg     lactobacillus rhamnosus (GG) (CULTURELL) capsule 1 capsule     pentoxifylline (TRENtal) CR tablet 400 mg     pravastatin (PRAVACHOL) tablet 40 mg     sodium bicarbonate tablet 1,300 mg     naloxone (NARCAN) injection 0.1-0.4 mg     senna-docusate (SENOKOT-S;PERICOLACE) 8.6-50 MG per tablet 1-2 tablet     ondansetron (ZOFRAN-ODT) ODT tab 4 mg    Or     ondansetron (ZOFRAN) injection 4 mg     prochlorperazine (COMPAZINE) injection 5 mg    Or     prochlorperazine (COMPAZINE) tablet 5 mg    Or     prochlorperazine (COMPAZINE) Suppository 12.5 mg     glucose 40 % gel 15-30 g    Or     dextrose 50 % injection 25-50 mL    Or     glucagon injection 1 mg     insulin aspart (NovoLOG) inj (RAPID ACTING)     insulin aspart (NovoLOG) inj (RAPID ACTING)     ipratropium - albuterol 0.5 mg/2.5 mg/3 mL (DUONEB) neb solution 3 mL     ipratropium - albuterol 0.5 mg/2.5 mg/3 mL (DUONEB) neb solution 3 mL     Attending Attestation   This patient has been seen and evaluated by me, Benja Zhao MD.  I have discussed the patient and today's care plan with the house staff team and agree with the findings and plan in this note and any edits by me are indicated above in blue.      I have reviewed today's care team notes, Medications, Vital Signs and Labs    Benja Zhao MD  [facility] Hospitalist  Pager  [phone]

## 2017-08-29 NOTE — PLAN OF CARE
Problem: Goal Outcome Summary  Goal: Goal Outcome Summary  OT-6B: Evaluation completed and treatment initiated. Therapist educated pt on OT role and discussed OT POC/Goals for therapy. Pt completed transfer sit<->standing with CGA/Min A, vc's and FWW. Pt engaged in standing self care task with CGA. Pt on 35% FiO2 HFNC. Pt tolerated this activity well with O2 saturations >90% throughout. VSS.     REC: Discharge to TCU to progress strength and activity tolerance for independent engagement in ADL's/IADL's.

## 2017-08-29 NOTE — PROGRESS NOTES
" 08/29/17 0900   Quick Adds   Type of Visit Initial Occupational Therapy Evaluation   Living Environment   Lives With friend(s);grandchild(zoe);spouse   Living Arrangements house   Home Accessibility bed and bath on same level;grab bars present (bathtub)   Number of Stairs to Enter Home 0   Number of Stairs Within Home 0   Transportation Available family or friend will provide   Self-Care   Dominant Hand right   Usual Activity Tolerance moderate   Current Activity Tolerance poor   Equipment Currently Used at Home grab bar;shower chair;walker, rolling;walker, standard;other (see comments)  (scooter chair for long distances)   Functional Level Prior   Ambulation 1-->assistive equipment   Transferring 1-->assistive equipment   Toileting 1-->assistive equipment   Bathing 1-->assistive equipment   Dressing 1-->assistive equipment   Eating 0-->independent   Communication 0-->understands/communicates without difficulty   Swallowing 0-->swallows foods/liquids without difficulty   Cognition 1 - attention or memory deficits   Fall history within last six months no   Which of the above functional risks had a recent onset or change? ambulation;transferring;toileting;bathing;dressing;cognition   General Information   Onset of Illness/Injury or Date of Surgery - Date 08/26/17   Referring Physician Cameron Burrell MD   Patient/Family Goals Statement Pt would like to return to PLOF and be able to function independently with FWW and AE at home.    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, DM2 not on medication, Afib s/p ablation not on warfarin, HTN, CAD s/p CABGx5, HFpEF (EF 60-65%, Aug2017), Chronic venous stasis ulcer, Gluteal cyst, presenting from TCU with progressive SOB and cough x 2 days\"   Precautions/Limitations fall precautions   Weight-Bearing Status - LUE full weight-bearing "   Weight-Bearing Status - RUE full weight-bearing   Weight-Bearing Status - LLE full weight-bearing   Weight-Bearing Status - RLE full weight-bearing   Cognitive Status Examination   Orientation orientation to person, place and time   Level of Consciousness alert   Able to Follow Commands WNL/WFL   Personal Safety (Cognitive) WNL/WFL   Visual Perception   Visual Perception No deficits were identified   Visual Perception Comments Pt able to read clock at 10ft and menu a 1ft without difficulty.    Sensory Examination   Sensory Comments Pt reports occasional tingling in BLE's. Pt able to feel light touch, and pressure throughout BUE's and BLE's.   Pain Assessment   Patient Currently in Pain No   Range of Motion (ROM)   ROM Comment BUE AROM WFL   Strength   Strength Comments BUE strength grossly 4/5 MMT. Pt demonstrates a moderate deficit in  strength.    Transfer Skills   Transfer Comments CGA/Min A sit<->standing with FWW. Min A, FWW and vc's for prolonged standing.    Instrumental Activities of Daily Living (IADL)   Previous Responsibilities meal prep;housekeeping;laundry;yardwork;medication management;finances   Activities of Daily Living Analysis   Impairments Contributing to Impaired Activities of Daily Living balance impaired;fear and anxiety;flexibility decreased;postural control impaired;strength decreased   General Therapy Interventions   Planned Therapy Interventions ADL retraining;IADL retraining;bed mobility training;cognition;ROM;strengthening;stretching;transfer training;home program guidelines;progressive activity/exercise   Clinical Impression   Criteria for Skilled Therapeutic Interventions Met yes, treatment indicated   OT Diagnosis Decreased independence with ADLs/IADLs, and functional transfers.    Influenced by the following impairments decreased strength, activity tolerance, increase O2 demands.    Assessment of Occupational Performance 3-5 Performance Deficits   Identified Performance  "Deficits Decreased independence and tolerance for ADLs/IADLs. Decreased independence with funcitonal transfers.    Clinical Decision Making (Complexity) Low complexity   Therapy Frequency 5 times/wk   Predicted Duration of Therapy Intervention (days/wks) 9/4/2017   Anticipated Discharge Disposition Transitional Care Facility   Risks and Benefits of Treatment have been explained. Yes   Patient, Family & other staff in agreement with plan of care Yes   [facility] TM \"6 Clicks\"   2016, Trustees of [facility], under license to [employer].  All rights reserved.   6 Clicks Short Forms Daily Activity Inpatient Short Form   [facility]  \"6 Clicks\" Daily Activity Inpatient Short Form   1. Putting on and taking off regular lower body clothing? 2 - A Lot   2. Bathing (including washing, rinsing, drying)? 2 - A Lot   3. Toileting, which includes using toilet, bedpan or urinal? 2 - A Lot   4. Putting on and taking off regular upper body clothing? 3 - A Little   5. Taking care of personal grooming such as brushing teeth? 3 - A Little   6. Eating meals? 4 - None   Daily Activity Raw Score (Score out of 24.Lower scores equate to lower levels of function) 16   Total Evaluation Time   Total Evaluation Time (Minutes) 15     "

## 2017-08-29 NOTE — PROGRESS NOTES
Progress Note:    Hospital Day: 3  Date of Initial SW Assessment: 08/17/2017    Data: Kathryn Banks is a 78 yo female admitted to [facility] 08/26/2017. Pt had been admitted to [facility] 08/12/17-08/18/17, then discharged to [facility] from 08/18/17 until being readmitted 8/26/17.    Intervention: Met w/pt to discuss dc plans.     Assessment: Pt would like to return to [facility]CU but pt did not hold the bed. SW contacted Ani in admissions at [facility]. There is a shared room available. SW faxed new referral to [facility] to determine if the TCU can accept pt back.    [facility]- reviewing referral for a potential readmission.  Main: [phone], Admissions: [phone], Fax: [phone]    Per RN pt currently requiring 11-15LPM O2 via oxymask. Pt was not on O2 prior to admission. Pt would likely need BLS ambulance transport for O2 needs d/t therapy note indicating pt has periods of somnolence and impulsivity, RN notes indicate pt's oxygen has fallen off; pt would likely not be able to self-regulate her high O2 need for transport.    Plan:  -Discharge plans in progress: Per rounds, possible dc today or tomorrow, pt was not aware. Pt would like to return to [facility]CU; waiting in admissions to decide if pt can return.   -Barriers to discharge plan: medical clearance and TCU placement.  -Follow up plan: SW will continue to follow and assist as needed.    SHANTELL Morgan, [facility]  6B Intermediate Care Unit   Phone: [phone]  Pager: [phone]

## 2017-08-29 NOTE — PLAN OF CARE
Problem: Goal Outcome Summary  Goal: Goal Outcome Summary  Outcome: Improving  Neuro: A&Ox4. Low energy levels but sat in chair from 4424-0493, tolerated well. Up to commode and chair with one person stand by assistance. No neuro abnormalities noted.   Cardiac: Afib with HR's 110-130 for initial part of shift. Gave 25 mg PO metoprolol a bit early (around 1850) - converted to NSR around 2035. BP's stable in both afib and NSR.   Respiratory: Oxygen fell off face a few times - during those times, O2 sats drop to 77% on room air. Generally maintaining O2 sats with 50% fiO2 via high flow nasal cannula. Productive cough.   GI/: Bumex IV push given this shift, urine outputs monitored q2h. Urine output adequate. x2 bowel movements. Negative C.diff testing today.   Diet/appetite: Renal diet, ate 75% of dinner. Blood sugars AC/HS - no insulin required my shift.   Pain: Denies pain.   Skin: with scattered BLE wounds, WOCN wrote orders for medihoney application. Medihoney ordered.   LDA's: 2 PIV's upon assuming care, one occluded and subsequently d/c'ed. Other PIV patent and saline locked.      Plan: Continue with POC, continue to attempt to wean fiO2. Notify primary team with changes.

## 2017-08-29 NOTE — PROGRESS NOTES
Pt admitted to 5B from 6B. Oriented to surroundings. Pt says she has no personal belongings with her except a watch. Will continue with POC.

## 2017-08-29 NOTE — PROVIDER NOTIFICATION
Spoke with Jackson goldstein to inform that earlier around 2035 - Kathryn converted back into NSR with HR's in the 80's. BP's stable.     Also, noticed at 2200 that neuro checks are ordered q4h. Neuro check done at 2200 - all WDL except Pupils not equal. Right pupil 3mm, left pupil 2mm. Both reactive to light but different sizes. Per patient, she has had cataract surgery in both eyes. Denies vision changes, MD notified. No other neurological abnormalities. MD verbalized understanding.

## 2017-08-29 NOTE — PROGRESS NOTES
Transfer  Transferred to: 5B  Via: WC  Reason for transfer:Pt no longer appropriate for 6B- improved patient condition  Family: Aware of transfer  Belongings: Packed and sent with pt  Chart: Delivered with pt to next unit  Medications: Meds received from old unit with pt  Pt status:    Neuro: A&Ox4. Neuro checks Q shift.  Cardiac: SR. VSS. Bumex BID for fluid overload.   Respiratory: weaned from 35-70% HFNC- now Sating 98% on 2L O2.  GI/: Adequate urine output via moreira. BM X1  Diet/appetite: Tolerating renal diet. Eating well.  Activity:  Assist of 1, up to chair with PT and OT today. SOB with exertion.  Pain: c/o no pain.   Skin: BLE ulcers- scabbed over or covered with per plan of care dressing changes. WOC RN saw and completed dressing changed 8/28.  LDA's: L PIV-SL    Plan: Continue with POC. Notify primary team with changes. Transferred to , pt breathing better and weaning o2 as tolerated. Plan to d.c in next couple of days. Monitor creatinine and other labs. Encourage ambulation.

## 2017-08-29 NOTE — PLAN OF CARE
Problem: Goal Outcome Summary  Goal: Goal Outcome Summary  Outcome: Therapy, progress toward functional goals as expected  6B PT- Pt very somnolent throughout session, only agreeable to seated B LE exercises and sit<>stand at EOB. Incontinent of bowel upon standing, immediate stand pivot transfer to commode with Burak and max VCs for safety due to pt impulsiveness. Pt is limited by proximal weakness and decreased activity tolerance. Recommend discharge to TCU upon medical stability to progress independence with functional mobility.

## 2017-08-30 ENCOUNTER — APPOINTMENT (OUTPATIENT)
Dept: PHYSICAL THERAPY | Facility: CLINIC | Age: 77
DRG: 291 | End: 2017-08-30
Payer: MEDICARE

## 2017-08-30 LAB
ANION GAP SERPL CALCULATED.3IONS-SCNC: 9 MMOL/L (ref 3–14)
BUN SERPL-MCNC: 38 MG/DL (ref 7–30)
CALCIUM SERPL-MCNC: 6 MG/DL (ref 8.5–10.1)
CHLORIDE SERPL-SCNC: 111 MMOL/L (ref 94–109)
CO2 SERPL-SCNC: 26 MMOL/L (ref 20–32)
CREAT SERPL-MCNC: 4.87 MG/DL (ref 0.52–1.04)
ERYTHROCYTE [DISTWIDTH] IN BLOOD BY AUTOMATED COUNT: 20.7 % (ref 10–15)
GFR SERPL CREATININE-BSD FRML MDRD: 9 ML/MIN/1.7M2
GLUCOSE BLDC GLUCOMTR-MCNC: 118 MG/DL (ref 70–99)
GLUCOSE BLDC GLUCOMTR-MCNC: 145 MG/DL (ref 70–99)
GLUCOSE BLDC GLUCOMTR-MCNC: 160 MG/DL (ref 70–99)
GLUCOSE BLDC GLUCOMTR-MCNC: 194 MG/DL (ref 70–99)
GLUCOSE SERPL-MCNC: 186 MG/DL (ref 70–99)
HCT VFR BLD AUTO: 29.6 % (ref 35–47)
HGB BLD-MCNC: 8.9 G/DL (ref 11.7–15.7)
MCH RBC QN AUTO: 25.9 PG (ref 26.5–33)
MCHC RBC AUTO-ENTMCNC: 30.1 G/DL (ref 31.5–36.5)
MCV RBC AUTO: 86 FL (ref 78–100)
PLATELET # BLD AUTO: 291 10E9/L (ref 150–450)
POTASSIUM SERPL-SCNC: 3.9 MMOL/L (ref 3.4–5.3)
RBC # BLD AUTO: 3.44 10E12/L (ref 3.8–5.2)
SODIUM SERPL-SCNC: 145 MMOL/L (ref 133–144)
WBC # BLD AUTO: 6.8 10E9/L (ref 4–11)

## 2017-08-30 PROCEDURE — 25000132 ZZH RX MED GY IP 250 OP 250 PS 637: Mod: GY | Performed by: STUDENT IN AN ORGANIZED HEALTH CARE EDUCATION/TRAINING PROGRAM

## 2017-08-30 PROCEDURE — A9270 NON-COVERED ITEM OR SERVICE: HCPCS | Mod: GY | Performed by: STUDENT IN AN ORGANIZED HEALTH CARE EDUCATION/TRAINING PROGRAM

## 2017-08-30 PROCEDURE — 40000193 ZZH STATISTIC PT WARD VISIT

## 2017-08-30 PROCEDURE — A9270 NON-COVERED ITEM OR SERVICE: HCPCS | Mod: GY | Performed by: INTERNAL MEDICINE

## 2017-08-30 PROCEDURE — 94640 AIRWAY INHALATION TREATMENT: CPT | Mod: 76

## 2017-08-30 PROCEDURE — 25000125 ZZHC RX 250: Performed by: STUDENT IN AN ORGANIZED HEALTH CARE EDUCATION/TRAINING PROGRAM

## 2017-08-30 PROCEDURE — 40000275 ZZH STATISTIC RCP TIME EA 10 MIN

## 2017-08-30 PROCEDURE — 94660 CPAP INITIATION&MGMT: CPT

## 2017-08-30 PROCEDURE — 94640 AIRWAY INHALATION TREATMENT: CPT

## 2017-08-30 PROCEDURE — 97116 GAIT TRAINING THERAPY: CPT | Mod: GP

## 2017-08-30 PROCEDURE — 80048 BASIC METABOLIC PNL TOTAL CA: CPT | Performed by: STUDENT IN AN ORGANIZED HEALTH CARE EDUCATION/TRAINING PROGRAM

## 2017-08-30 PROCEDURE — 00000146 ZZHCL STATISTIC GLUCOSE BY METER IP

## 2017-08-30 PROCEDURE — 99233 SBSQ HOSP IP/OBS HIGH 50: CPT | Mod: GC | Performed by: INTERNAL MEDICINE

## 2017-08-30 PROCEDURE — 97530 THERAPEUTIC ACTIVITIES: CPT | Mod: GP

## 2017-08-30 PROCEDURE — 85027 COMPLETE CBC AUTOMATED: CPT | Performed by: STUDENT IN AN ORGANIZED HEALTH CARE EDUCATION/TRAINING PROGRAM

## 2017-08-30 PROCEDURE — S0171 BUMETANIDE 0.5 MG: HCPCS | Performed by: STUDENT IN AN ORGANIZED HEALTH CARE EDUCATION/TRAINING PROGRAM

## 2017-08-30 PROCEDURE — 25000132 ZZH RX MED GY IP 250 OP 250 PS 637: Mod: GY | Performed by: INTERNAL MEDICINE

## 2017-08-30 PROCEDURE — 94799 UNLISTED PULMONARY SVC/PX: CPT

## 2017-08-30 PROCEDURE — 36415 COLL VENOUS BLD VENIPUNCTURE: CPT | Performed by: STUDENT IN AN ORGANIZED HEALTH CARE EDUCATION/TRAINING PROGRAM

## 2017-08-30 PROCEDURE — 97110 THERAPEUTIC EXERCISES: CPT | Mod: GP

## 2017-08-30 PROCEDURE — 12000008 ZZH R&B INTERMEDIATE UMMC

## 2017-08-30 PROCEDURE — 40000274 ZZH STATISTIC RCP CONSULT EA 30 MIN

## 2017-08-30 RX ORDER — PRAMIPEXOLE DIHYDROCHLORIDE 0.25 MG/1
1 TABLET ORAL AT BEDTIME
Status: DISCONTINUED | OUTPATIENT
Start: 2017-08-30 | End: 2017-09-01 | Stop reason: HOSPADM

## 2017-08-30 RX ORDER — AMLODIPINE BESYLATE 5 MG/1
5 TABLET ORAL DAILY
Status: DISCONTINUED | OUTPATIENT
Start: 2017-08-30 | End: 2017-09-01 | Stop reason: HOSPADM

## 2017-08-30 RX ORDER — IPRATROPIUM BROMIDE AND ALBUTEROL SULFATE 2.5; .5 MG/3ML; MG/3ML
1 SOLUTION RESPIRATORY (INHALATION) EVERY 6 HOURS PRN
COMMUNITY
End: 2018-07-20

## 2017-08-30 RX ORDER — PRAMIPEXOLE 0.75 MG/1
0.75 TABLET, EXTENDED RELEASE ORAL DAILY
Status: DISCONTINUED | OUTPATIENT
Start: 2017-08-30 | End: 2017-08-30

## 2017-08-30 RX ORDER — IPRATROPIUM BROMIDE AND ALBUTEROL SULFATE 2.5; .5 MG/3ML; MG/3ML
3 SOLUTION RESPIRATORY (INHALATION) EVERY 4 HOURS PRN
Status: DISCONTINUED | OUTPATIENT
Start: 2017-08-30 | End: 2017-08-30

## 2017-08-30 RX ORDER — GUAIFENESIN/DEXTROMETHORPHAN 100-10MG/5
5 SYRUP ORAL EVERY 8 HOURS PRN
Status: DISCONTINUED | OUTPATIENT
Start: 2017-08-30 | End: 2017-09-01 | Stop reason: HOSPADM

## 2017-08-30 RX ADMIN — PRAMIPEXOLE DIHYDROCHLORIDE 1 MG: 0.25 TABLET ORAL at 21:04

## 2017-08-30 RX ADMIN — CALCIUM ACETATE 667 MG: 667 CAPSULE ORAL at 18:10

## 2017-08-30 RX ADMIN — Medication 1 MG: at 21:04

## 2017-08-30 RX ADMIN — GUAIFENESIN AND DEXTROMETHORPHAN 5 ML: 100; 10 SYRUP ORAL at 12:49

## 2017-08-30 RX ADMIN — METOPROLOL TARTRATE 25 MG: 25 TABLET ORAL at 08:11

## 2017-08-30 RX ADMIN — AMLODIPINE BESYLATE 5 MG: 5 TABLET ORAL at 12:53

## 2017-08-30 RX ADMIN — INSULIN ASPART 1 UNITS: 100 INJECTION, SOLUTION INTRAVENOUS; SUBCUTANEOUS at 18:11

## 2017-08-30 RX ADMIN — IPRATROPIUM BROMIDE AND ALBUTEROL SULFATE 3 ML: .5; 3 SOLUTION RESPIRATORY (INHALATION) at 08:35

## 2017-08-30 RX ADMIN — ASPIRIN 81 MG: 81 TABLET, COATED ORAL at 08:10

## 2017-08-30 RX ADMIN — Medication 1 CAPSULE: at 08:11

## 2017-08-30 RX ADMIN — PENTOXIFYLLINE 400 MG: 400 TABLET, FILM COATED, EXTENDED RELEASE ORAL at 08:11

## 2017-08-30 RX ADMIN — SODIUM BICARBONATE 650 MG TABLET 1300 MG: at 08:11

## 2017-08-30 RX ADMIN — BUMETANIDE 2 MG: 0.25 INJECTION INTRAMUSCULAR; INTRAVENOUS at 08:10

## 2017-08-30 RX ADMIN — IPRATROPIUM BROMIDE AND ALBUTEROL SULFATE 3 ML: .5; 3 SOLUTION RESPIRATORY (INHALATION) at 12:09

## 2017-08-30 RX ADMIN — SODIUM BICARBONATE 650 MG TABLET 1300 MG: at 14:38

## 2017-08-30 RX ADMIN — INSULIN ASPART 1 UNITS: 100 INJECTION, SOLUTION INTRAVENOUS; SUBCUTANEOUS at 12:49

## 2017-08-30 RX ADMIN — CALCIUM ACETATE 667 MG: 667 CAPSULE ORAL at 12:53

## 2017-08-30 RX ADMIN — VITAMIN D, TAB 1000IU (100/BT) 2000 UNITS: 25 TAB at 08:10

## 2017-08-30 RX ADMIN — OYSTER SHELL CALCIUM WITH VITAMIN D 1 TABLET: 500; 200 TABLET, FILM COATED ORAL at 08:10

## 2017-08-30 RX ADMIN — METOPROLOL TARTRATE 25 MG: 25 TABLET ORAL at 21:03

## 2017-08-30 RX ADMIN — FERROUS SULFATE TAB 325 MG (65 MG ELEMENTAL FE) 325 MG: 325 (65 FE) TAB at 08:11

## 2017-08-30 RX ADMIN — CALCIUM ACETATE 667 MG: 667 CAPSULE ORAL at 08:10

## 2017-08-30 RX ADMIN — SODIUM BICARBONATE 650 MG TABLET 1300 MG: at 21:04

## 2017-08-30 RX ADMIN — GUAIFENESIN AND DEXTROMETHORPHAN 5 ML: 100; 10 SYRUP ORAL at 21:04

## 2017-08-30 RX ADMIN — BUMETANIDE 2 MG: 0.25 INJECTION INTRAMUSCULAR; INTRAVENOUS at 14:38

## 2017-08-30 RX ADMIN — PRAVASTATIN SODIUM 40 MG: 20 TABLET ORAL at 08:11

## 2017-08-30 NOTE — PROGRESS NOTES
Pt a/o x 3. VSS on 2 L NC, attempted to wean pt off o2 at sats dropped to 88-90%. Plan is for one more day of IV bumex to wean off o2 and to transfer back to [facility] tomorrow where her bed has been held. Tele d/c. PIV S.L. Pt denies any pain this shift. Will continue to monitor pt status.

## 2017-08-30 NOTE — PLAN OF CARE
Problem: Goal Outcome Summary  Goal: Goal Outcome Summary     5B: cxl - attempted to see pt this afternoon however pt declined due to napping; unable to checkback this date.

## 2017-08-30 NOTE — PROGRESS NOTES
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      # MARTÍN on CKD  # Hypervolemia 2/2 to HFpEF  Cardiorenal with fluid overload. Cr as of January 2017 was 1.2 but has been admitted with MARTÍN on CKI for 7 times in the past 2 months. Multiple provocations from infection (cellulitis/ PNA/UTI) and was in the ICU before for respiratory distress. At recent nephrology appointment, was having diarrhea and was thought to have fluid deficit; was encouraged to increase PO intake. However, continued with increased PO intake despite diarrhea resolution. Less suspicious of PNA: no fever, not producing sputum, negative procal and diffuse B-line on bedside U/S. FEUrea supports pre-renal. JVP ~11 on exam 8/29. Lung U/S 8/30 consistent with continued volume overload.   - Discontinued antibiotics, will continue to monitor for fever  - IV bumex intermittent dosing targeting goal negative 1-2L per day  - continue supplemental O2, wean as tolerated  - Daily I/O, weight, BMP, avoid nephrotoxic agents  - Cee due to urinary retention in setting of diuresis  - [facility] clinic referral completed  - consider cards consult  - likely PO diuretics at discharge with close follow up with PCP and nephrology  - venous mapping for fistula planning ordered    # Anemia  Hemoglobin found to be in 6s on 8/27. No current signs or symptoms of bleeding. Vitals are stable. Transfused 1 unit PRBCs on 8/27 with proper response in hemoglobin. Hgb 8.0 (stable from prior) 8/29. Hemolysis labs negative. Venofer  "recommended, but not tolerated by patient (shortness of breath and dizziness after initiating infusion).   - continue to monitor  - continue PO ferrous sulfate 325mg QD   - consider EPO in outpt setting through nephrology f/u  - chronic Hgb goal 10    # Afib s/p ablation 6/2017   Atrial fibrillation with RVR noted on telemetry 8/28 - confirmed on EKG. Asymptomatic and hemodynamically stable. Was followed by cardiology and determined okay to be off warfarin. No issues since ablation - possibly due to stretch from hypervolemia. Converted into NSR overnight.   - on PTA metoprolol  - continued diuresis as above  - continue telemetry      # CKD-BMD, secondary hyperPTH, VitaminD deficient  - continue phoslo, vitaminD, calcium carbonate  - last ferritin 242, continue ferrous supplement     # HTN  Was on decreased dose of metoprolol. MAPS have been creeping up over admission in setting of holding PTA amlodipine.   - continue PTA metoprolol 25   - restart amlodipine 5mg      # Decreased acuity of hearing on left  Likely secondary to eustachian tube dysfunction, in setting of nasal congestion. Complains of difficulty hearing on left, acute change overnight. Clear fluid present on examination behind TM on left, no s/s infection; exam otherwise benign. No visual changes, pain.  - Continue to monitor  - Consider nasal fluticasone if persists    # DM2  Diet control, likely because of deteriorating renal function. 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 However, platelets 212 on same date, and have been within the range of normal for this entire admission (250 on 8/29). No new " thrombosis, no skin necrosis, and no systemic reaction documented; unclear rationale for why HIT lab was ordered. Based on this, would not have concerns about giving heparin in the future.    Chronic problems  # Vertical Diplopia: no change currently  # CAD s/p CABG : continue ASA, pravastatin  # Chronic diarrhea : check c diff ordered 8/28 for loose stool  # Gluteal cyst : stable, no I&D per last surgery     FEN: renal diet  DVT Prophylaxis:  SCD  Disposition: PT/OT, likely return to TCU  Code Status: FULL    Patient discussed with attending physician Dr. Zhao, who agrees with the above assessment and plan.     aMgan Gould MD, RUDDY  PGY-1, Internal Medicine  [place] [address]  Pager: [phone]  ___________________________________________________________________    Subjective & Interval Hx:    No acute events overnight. Notes she feels well today; shortness of breath continues to improve, but still coughing a bit. No signs or symptoms of bleeding. Hearing still diminished on left; no pain. Creatinine continues to downtrend slightly. Hemoglobin is stable/rising. Discussed venous mapping with patient as part of fistula planning.     Last 24 hr care team notes reviewed.   ROS:  4 point ROS including Respiratory, CV, GI and  (other than that noted in the HPI) is negative    Medications:  Reviewed in EPIC. List below for reference    Physical Exam:  Blood pressure 125/61, pulse 80, temperature 99.4  F (37.4  C), temperature source Oral, resp. rate 16, weight 80 kg (176 lb 4.8 oz), SpO2 95 %.    I/O last 3 completed shifts:  In: 810 [P.O.:810]  Out: 3575 [Urine:3575]    General: AAOx3, pleasantly interactive  HEENT:  PERRL, EOMI, MMM.    Cardiac: RRR, Normal S1, S2.  Pulm: occasional wheezes,  Bilateral crackles, R>L, improved from yesterday's examination  Skin: No rash  MSK: no joint swelling, bilateral LE edema, improved from yesterday's examination  Psych: normal mood, full affect    Labs & Studies of Note:   Labs  and studies reviewed in EPIC    Medications list for Reference:   Current Facility-Administered Medications   Medication     guaiFENesin-dextromethorphan (ROBITUSSIN DM) 100-10 MG/5ML syrup 5 mL     amLODIPine (NORVASC) tablet 5 mg     pramipexole (MIRAPEX) tablet 1 mg     metoprolol (LOPRESSOR) tablet 25 mg     bumetanide (BUMEX) injection 2 mg     acetaminophen (TYLENOL) tablet 650 mg     aspirin EC EC tablet 81 mg     calcium acetate (PHOSLO) capsule 667 mg     calcium carb 1250 mg (500 mg [place])/vitamin D 200 units (OSCAL with D) per tablet 1 tablet     cholecalciferol (vitamin D) tablet 2,000 Units     ferrous sulfate (IRON) tablet 325 mg     lactobacillus rhamnosus (GG) (CULTURELL) capsule 1 capsule     pentoxifylline (TRENtal) CR tablet 400 mg     pravastatin (PRAVACHOL) tablet 40 mg     sodium bicarbonate tablet 1,300 mg     naloxone (NARCAN) injection 0.1-0.4 mg     senna-docusate (SENOKOT-S;PERICOLACE) 8.6-50 MG per tablet 1-2 tablet     ondansetron (ZOFRAN-ODT) ODT tab 4 mg    Or     ondansetron (ZOFRAN) injection 4 mg     prochlorperazine (COMPAZINE) injection 5 mg    Or     prochlorperazine (COMPAZINE) tablet 5 mg    Or     prochlorperazine (COMPAZINE) Suppository 12.5 mg     glucose 40 % gel 15-30 g    Or     dextrose 50 % injection 25-50 mL    Or     glucagon injection 1 mg     insulin aspart (NovoLOG) inj (RAPID ACTING)     insulin aspart (NovoLOG) inj (RAPID ACTING)     ipratropium - albuterol 0.5 mg/2.5 mg/3 mL (DUONEB) neb solution 3 mL     Attending Attestation   This patient has been seen and evaluated by me, Benja Zhao MD.  I have discussed the patient and today's care plan with the house staff team and agree with the findings and plan in this note and any edits by me are indicated above in blue.      I have reviewed today's care team notes, Medications, Vital Signs and Labs    Benja Zhao MD  Med-Peds Hospitalist  Pager [phone]

## 2017-08-30 NOTE — PLAN OF CARE
Problem: Goal Outcome Summary  Goal: Goal Outcome Summary  PT 5B: On commode upon arrival, agreeable to PT. Dependent with hygiene. Completes transfers at close SBA with use of walker. Completed some grooming tasks prior to mobilizing. Completed seated LE strengthening with good tolerance, appears ready to progress. Tolerates multiple short boughts of ambulation needing a fairly significant seated rest break after each. Pt on 2L O2 throughout. Ended with all needs in reach with lab. Pt does not appear confused but at times a bit scattered, may benefit from cognitive eval? At this time continue to recommend d/c to rehab to progress safety and independence with functional mobility as well as overall strength and endurance.

## 2017-08-30 NOTE — PHARMACY-ADMISSION MEDICATION HISTORY
Admission medication history interview status for the 8/26/2017 admission is complete. See Epic admission navigator for allergy information, pharmacy, prior to admission medications and immunization status.     Medication history interview sources:  MAR from [facility] [phone]    Changes made to PTA medication list (reason)  Added: cephalexin, torsemide  Deleted: none   Changed: none    Additional medication history information (including reliability of information, actions taken by pharmacist):     -Toresemide dosing seemed to be recent, and daily (ie likely based on fluid status)- In the days leading up to admission, was doing 20mg daily or 20mg BID  Based on daily orders  -Cephalexin started 8/25 with the intention of a 10 day UTI treatment course. VERY aggressive for renal current function. Probably should be max dosed at 500mg once or twice daily.   -Last fill of pramipexole 3mg HS was per [facility] pharmacy from march, 2017. NOT listed on the current MAR      Prior to Admission medications    Medication Sig Last Dose Taking? Auth Provider   TORSEMIDE PO Take 20 mg by mouth daily 8/27/2017 Yes Unknown, Entered By History   Calcium Carb-Cholecalciferol 500-400 MG-UNIT TABS Take 1 tablet by mouth 2 times daily 8/27/2017 Yes Unknown, Entered By History   CEPHALEXIN PO Take 500 mg by mouth 3 times daily X 10 days for UTI, started 8/25 8/27/2017 Yes Unknown, Entered By History   ipratropium - albuterol 0.5 mg/2.5 mg/3 mL (DUONEB) 0.5-2.5 (3) MG/3ML neb solution Take 1 vial by nebulization 3 times daily 8/27/2017 Yes Unknown, Entered By History   ACETAMINOPHEN PO Take 650 mg by mouth every 4 hours as needed for pain For pain 1-5 out of 10   Reported, Patient   TRAMADOL HCL PO Take 50 mg by mouth every 6 hours as needed for moderate to severe pain Pain of 6-10 out of 10 8/26/2017  Reported, Patient   cyanocobalamin (VITAMIN B12) 1000 MCG/ML injection Inject 1 mL into the muscle every 30 days 8/21/2017   Reported, Patient   aspirin 81 MG tablet Take 1 tablet (81 mg) by mouth daily 8/27/2017  Yareli Lorenzo MD   sodium bicarbonate 650 MG tablet Take 2 tablets (1,300 mg) by mouth 3 times daily 8/27/2017  Yareli Lorenzo MD   nitroGLYcerin (NITROSTAT) 0.4 MG sublingual tablet Place 1 tablet (0.4 mg) under the tongue every 5 minutes as needed for chest pain   Yareli Lorenzo MD   gabapentin (NEURONTIN) 300 MG capsule Take 1 capsule (300 mg) by mouth At Bedtime 8/27/2017  Yareli Lorenzo MD   pravastatin (PRAVACHOL) 40 MG tablet Take 1 tablet (40 mg) by mouth daily 8/27/2017  Yareli Lorenzo MD   metoprolol (LOPRESSOR) 50 MG tablet Take 0.5 tablets (25 mg) by mouth 2 times daily 8/27/2017  Yareli Lorenzo MD   amLODIPine (NORVASC) 5 MG tablet Take 2 tablets (10 mg) by mouth daily 8/27/2017  Yareli Lorenzo MD   ferrous sulfate (IRON) 325 (65 FE) MG tablet Take 1 tablet (325 mg) by mouth daily (with breakfast) 8/27/2017  Yareli Lorenzo MD   calcium acetate (PHOSLO) 667 MG CAPS capsule Take 1 capsule (667 mg) by mouth 3 times daily (with meals) 8/27/2017  Yareli Lorenzo MD   pentoxifylline (TRENTAL) 400 MG CR tablet Take 1 tablet (400 mg) by mouth daily 8/27/2017  Yareli Lorenzo MD   cholecalciferol 2000 UNITS tablet Take 2,000 Units by mouth daily 8/27/2017  Yareli Lorenzo MD   Lactobacillus (ACIDOPHILUS) tablet Take 1 tablet by mouth daily 8/27/2017  [place]ake staley MD   ORDER FOR DME Equipment being ordered: cpap machine, mask, humidifier, tubing and filters   Dheeraj Jj MD         Medication history completed by:     Jose Alfredo OlivaD, [facility]    [phone]  Pager [phone]

## 2017-08-30 NOTE — PLAN OF CARE
Problem: Goal Outcome Summary  Goal: Goal Outcome Summary  Outcome: No Change  Pt is alert and oriented but slightly forgetful. Denies pain. Up with assist of 1 and walker. Showered tonight. Pt has productive cough. Still requiring 2 L NC. Encouraging IS use. Pt has what appears to be fungal rash in groin. Maroon cross-cover notified.

## 2017-08-30 NOTE — PROGRESS NOTES
Social Work Services Progress Note    Hospital Day: 5  Date of Initial Social Work Evaluation:  8/26/17  Collaborated with:  [facility], bedside RN, medicine    Data:  Kathryn is a 77 year old female admitted to [facility] 8/26/17 from TCU at [facility] for worsening of SOB and MARTÍN on chronic KD, as well as delirium. Patient with recent admission 8/12-8/18/17. At baseline is from home with spouse and adult child and grand-child. Mostly independent.    Intervention:  SW coordinated with admissions at [facility]. Faxed requested information, patient has a bed hold at facility and is OK to return when ready to [place].     Assessment: Kathryn is agreeable to returning to [facility] at [place]. She is aware of the bed hold. Likely will have spouse transport at [place]. [id number] on file from 8/18/17    Plan:    Anticipated Disposition:  Facility:  [facility]    Barriers to d/c plan:  Medical stability, anticipate return in 1-2 days    Follow Up:  sw facilitating return to TCU    Alyx RAMAN MSW  5B  (Medical/Surgical)  Phone: [phone]  Pager: [phone]

## 2017-08-30 NOTE — PLAN OF CARE
Problem: Goal Outcome Summary  Goal: Goal Outcome Summary  S=Pt is currently in bed sleeping.   B=Pt transferred from , treatment for respiratory distress. Pt has extensive history.   A=VSS. Calls appropriately. Denies pain except when her legs are touched. Pt has many wounds to legs of various sizes and stages of healing. Dressing changesSee WOCN notes for wound care orders. Pt is up with walker and one assist, . Had shower and snack before bed. RT came to place BiPAP per pt request when she was ready for sleep. Pt requested to be allowed to sleep with will check blood glucose at 0600 with VS. Pt has one loose stool in the night, commode used due to urgency. Cee intact and patent, strict I/O's. .   R=Continue with POC. Report any changes or concerns to MD team.

## 2017-08-31 ENCOUNTER — APPOINTMENT (OUTPATIENT)
Dept: GENERAL RADIOLOGY | Facility: CLINIC | Age: 77
DRG: 291 | End: 2017-08-31
Payer: MEDICARE

## 2017-08-31 ENCOUNTER — APPOINTMENT (OUTPATIENT)
Dept: OCCUPATIONAL THERAPY | Facility: CLINIC | Age: 77
DRG: 291 | End: 2017-08-31
Payer: MEDICARE

## 2017-08-31 ENCOUNTER — APPOINTMENT (OUTPATIENT)
Dept: ULTRASOUND IMAGING | Facility: CLINIC | Age: 77
DRG: 291 | End: 2017-08-31
Payer: MEDICARE

## 2017-08-31 LAB
ANION GAP SERPL CALCULATED.3IONS-SCNC: 6 MMOL/L (ref 3–14)
BUN SERPL-MCNC: 38 MG/DL (ref 7–30)
CALCIUM SERPL-MCNC: 5.7 MG/DL (ref 8.5–10.1)
CHLORIDE SERPL-SCNC: 110 MMOL/L (ref 94–109)
CO2 SERPL-SCNC: 32 MMOL/L (ref 20–32)
CREAT SERPL-MCNC: 4.84 MG/DL (ref 0.52–1.04)
ERYTHROCYTE [DISTWIDTH] IN BLOOD BY AUTOMATED COUNT: 20.2 % (ref 10–15)
GFR SERPL CREATININE-BSD FRML MDRD: 9 ML/MIN/1.7M2
GLUCOSE BLDC GLUCOMTR-MCNC: 108 MG/DL (ref 70–99)
GLUCOSE BLDC GLUCOMTR-MCNC: 123 MG/DL (ref 70–99)
GLUCOSE BLDC GLUCOMTR-MCNC: 139 MG/DL (ref 70–99)
GLUCOSE BLDC GLUCOMTR-MCNC: 173 MG/DL (ref 70–99)
GLUCOSE SERPL-MCNC: 101 MG/DL (ref 70–99)
HCT VFR BLD AUTO: 26 % (ref 35–47)
HGB BLD-MCNC: 7.7 G/DL (ref 11.7–15.7)
MCH RBC QN AUTO: 25.5 PG (ref 26.5–33)
MCHC RBC AUTO-ENTMCNC: 29.6 G/DL (ref 31.5–36.5)
MCV RBC AUTO: 86 FL (ref 78–100)
PLATELET # BLD AUTO: 256 10E9/L (ref 150–450)
POTASSIUM SERPL-SCNC: 3.6 MMOL/L (ref 3.4–5.3)
RBC # BLD AUTO: 3.02 10E12/L (ref 3.8–5.2)
SODIUM SERPL-SCNC: 148 MMOL/L (ref 133–144)
WBC # BLD AUTO: 5.4 10E9/L (ref 4–11)

## 2017-08-31 PROCEDURE — 00000146 ZZHCL STATISTIC GLUCOSE BY METER IP

## 2017-08-31 PROCEDURE — S0171 BUMETANIDE 0.5 MG: HCPCS | Performed by: STUDENT IN AN ORGANIZED HEALTH CARE EDUCATION/TRAINING PROGRAM

## 2017-08-31 PROCEDURE — 71020 XR CHEST 2 VW: CPT

## 2017-08-31 PROCEDURE — A9270 NON-COVERED ITEM OR SERVICE: HCPCS | Mod: GY | Performed by: STUDENT IN AN ORGANIZED HEALTH CARE EDUCATION/TRAINING PROGRAM

## 2017-08-31 PROCEDURE — 85027 COMPLETE CBC AUTOMATED: CPT | Performed by: STUDENT IN AN ORGANIZED HEALTH CARE EDUCATION/TRAINING PROGRAM

## 2017-08-31 PROCEDURE — 97535 SELF CARE MNGMENT TRAINING: CPT | Mod: GO

## 2017-08-31 PROCEDURE — 25000125 ZZHC RX 250: Performed by: STUDENT IN AN ORGANIZED HEALTH CARE EDUCATION/TRAINING PROGRAM

## 2017-08-31 PROCEDURE — 25000132 ZZH RX MED GY IP 250 OP 250 PS 637: Mod: GY | Performed by: STUDENT IN AN ORGANIZED HEALTH CARE EDUCATION/TRAINING PROGRAM

## 2017-08-31 PROCEDURE — 12000001 ZZH R&B MED SURG/OB UMMC

## 2017-08-31 PROCEDURE — 99233 SBSQ HOSP IP/OBS HIGH 50: CPT | Mod: GC | Performed by: INTERNAL MEDICINE

## 2017-08-31 PROCEDURE — 40000275 ZZH STATISTIC RCP TIME EA 10 MIN

## 2017-08-31 PROCEDURE — 40000809 ZZH STATISTIC NO DOCUMENTATION TO SUPPORT CHARGE

## 2017-08-31 PROCEDURE — 93971 EXTREMITY STUDY: CPT | Mod: LT

## 2017-08-31 PROCEDURE — S0169 CALCITROL: HCPCS | Mod: GY | Performed by: STUDENT IN AN ORGANIZED HEALTH CARE EDUCATION/TRAINING PROGRAM

## 2017-08-31 PROCEDURE — 40000047 ZZH STATISTIC CTO2 CONT OXYGEN TECH TIME EA 90 MIN

## 2017-08-31 PROCEDURE — 80048 BASIC METABOLIC PNL TOTAL CA: CPT | Performed by: STUDENT IN AN ORGANIZED HEALTH CARE EDUCATION/TRAINING PROGRAM

## 2017-08-31 PROCEDURE — 94640 AIRWAY INHALATION TREATMENT: CPT | Mod: 76

## 2017-08-31 PROCEDURE — 25000125 ZZHC RX 250: Performed by: INTERNAL MEDICINE

## 2017-08-31 PROCEDURE — 94660 CPAP INITIATION&MGMT: CPT

## 2017-08-31 PROCEDURE — 40000133 ZZH STATISTIC OT WARD VISIT

## 2017-08-31 PROCEDURE — A9270 NON-COVERED ITEM OR SERVICE: HCPCS | Mod: GY | Performed by: INTERNAL MEDICINE

## 2017-08-31 PROCEDURE — 25000132 ZZH RX MED GY IP 250 OP 250 PS 637: Mod: GY | Performed by: INTERNAL MEDICINE

## 2017-08-31 PROCEDURE — 36415 COLL VENOUS BLD VENIPUNCTURE: CPT | Performed by: STUDENT IN AN ORGANIZED HEALTH CARE EDUCATION/TRAINING PROGRAM

## 2017-08-31 PROCEDURE — 94640 AIRWAY INHALATION TREATMENT: CPT

## 2017-08-31 RX ORDER — CALCITRIOL 0.5 UG/1
0.5 CAPSULE, LIQUID FILLED ORAL DAILY
Status: DISCONTINUED | OUTPATIENT
Start: 2017-08-31 | End: 2017-09-01 | Stop reason: HOSPADM

## 2017-08-31 RX ORDER — TORSEMIDE 20 MG/1
20 TABLET ORAL 2 TIMES DAILY
Status: DISCONTINUED | OUTPATIENT
Start: 2017-08-31 | End: 2017-09-01 | Stop reason: HOSPADM

## 2017-08-31 RX ORDER — IPRATROPIUM BROMIDE AND ALBUTEROL SULFATE 2.5; .5 MG/3ML; MG/3ML
3 SOLUTION RESPIRATORY (INHALATION) ONCE
Status: COMPLETED | OUTPATIENT
Start: 2017-08-31 | End: 2017-08-31

## 2017-08-31 RX ADMIN — VITAMIN D, TAB 1000IU (100/BT) 2000 UNITS: 25 TAB at 08:26

## 2017-08-31 RX ADMIN — CALCIUM CARBONATE-VITAMIN D TAB 500 MG-200 UNIT 1 TABLET: 500-200 TAB at 18:23

## 2017-08-31 RX ADMIN — SODIUM BICARBONATE 650 MG TABLET 1300 MG: at 14:59

## 2017-08-31 RX ADMIN — OYSTER SHELL CALCIUM WITH VITAMIN D 1 TABLET: 500; 200 TABLET, FILM COATED ORAL at 08:26

## 2017-08-31 RX ADMIN — PRAMIPEXOLE DIHYDROCHLORIDE 1 MG: 0.25 TABLET ORAL at 21:28

## 2017-08-31 RX ADMIN — METOPROLOL TARTRATE 25 MG: 25 TABLET ORAL at 19:54

## 2017-08-31 RX ADMIN — SODIUM BICARBONATE 650 MG TABLET 1300 MG: at 19:54

## 2017-08-31 RX ADMIN — CALCIUM ACETATE 667 MG: 667 CAPSULE ORAL at 18:23

## 2017-08-31 RX ADMIN — FERROUS SULFATE TAB 325 MG (65 MG ELEMENTAL FE) 325 MG: 325 (65 FE) TAB at 08:26

## 2017-08-31 RX ADMIN — PRAVASTATIN SODIUM 40 MG: 20 TABLET ORAL at 08:26

## 2017-08-31 RX ADMIN — CALCITRIOL 0.5 MCG: 0.5 CAPSULE, LIQUID FILLED ORAL at 13:05

## 2017-08-31 RX ADMIN — BUMETANIDE 2 MG: 0.25 INJECTION INTRAMUSCULAR; INTRAVENOUS at 08:24

## 2017-08-31 RX ADMIN — TORSEMIDE 20 MG: 20 TABLET ORAL at 19:54

## 2017-08-31 RX ADMIN — GUAIFENESIN AND DEXTROMETHORPHAN 5 ML: 100; 10 SYRUP ORAL at 08:17

## 2017-08-31 RX ADMIN — CALCIUM ACETATE 667 MG: 667 CAPSULE ORAL at 08:25

## 2017-08-31 RX ADMIN — ASPIRIN 81 MG: 81 TABLET, COATED ORAL at 08:24

## 2017-08-31 RX ADMIN — CALCIUM ACETATE 667 MG: 667 CAPSULE ORAL at 13:05

## 2017-08-31 RX ADMIN — IPRATROPIUM BROMIDE AND ALBUTEROL SULFATE 3 ML: .5; 3 SOLUTION RESPIRATORY (INHALATION) at 13:47

## 2017-08-31 RX ADMIN — METOPROLOL TARTRATE 25 MG: 25 TABLET ORAL at 08:26

## 2017-08-31 RX ADMIN — AMLODIPINE BESYLATE 5 MG: 5 TABLET ORAL at 08:24

## 2017-08-31 RX ADMIN — PENTOXIFYLLINE 400 MG: 400 TABLET, FILM COATED, EXTENDED RELEASE ORAL at 08:26

## 2017-08-31 RX ADMIN — Medication 1 CAPSULE: at 08:26

## 2017-08-31 RX ADMIN — SODIUM BICARBONATE 650 MG TABLET 1300 MG: at 08:27

## 2017-08-31 RX ADMIN — IPRATROPIUM BROMIDE AND ALBUTEROL SULFATE 3 ML: .5; 3 SOLUTION RESPIRATORY (INHALATION) at 03:34

## 2017-08-31 NOTE — PLAN OF CARE
Problem: Goal Outcome Summary  Goal: Goal Outcome Summary  PT 5B: CANCEL; pt at procedure at time of attempt. Will reschedule.

## 2017-08-31 NOTE — PLAN OF CARE
Problem: Goal Outcome Summary  Goal: Goal Outcome Summary  OT/5B: Pt seated in bedside chair upon. Performed sit to stand with CGA/SBA A with walker, ambulated in room with SBA. Completed g/h tasks standing at sink with rest breaks as needed. Provided education on EC/WS and pacing strategies to help manage fatigue-pt able to provided examples.     Per plan established by the OT, the recommendation for dc location is TCU to progress strength and activity tolerance for independent engagement in ADL's/IADL's.    .

## 2017-08-31 NOTE — PROGRESS NOTES
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      # MARTÍN on CKD  # Hypervolemia  # Cough  Persistent asymptomatic hypoxia (approx 85% ORA) and cough despite good response to diuresis.  Weight down approximately 10 kg since admission.  Remains afebrile with normal WBC.  Noted to have prolonged expiratory phase on exam consistent with obstruction.  Will trial nebs and further O2 weaning today.  Transitioning from IV bumex to home toresmide regimen.   - Stop bumex   - continue supplemental O2, wean as tolerated  - Daily I/O, weight, BMP, avoid nephrotoxic agents  - Cee due to urinary retention in setting of diuresis  - venous mapping for fistula planning done this AM    # Asymptomatic chronic hypocalcemia  Corrected calcium 7.5 (albumin 1.8), magnesium recently 1.7.  Has been receiving calcium carbonate (500 mg bea) once daily.  Chronic hypocalcemia has been evaluated in the past and felt secondary to CKD.  Will switch to calcitriol and increase calcium carb to BID.    # Anemia  Hemoglobin found to be in 6s on 8/27. No current signs or symptoms of bleeding. Vitals are stable. Transfused 1 unit PRBCs on 8/27 with proper response in hemoglobin. Hgb 8.0 (stable from prior) 8/29. Hemolysis labs negative. Venofer recommended, but not tolerated by patient (shortness of breath and dizziness after initiating infusion).   - continue to monitor  - continue PO ferrous sulfate 325mg QD   - consider EPO in outpt setting through nephrology f/u  - chronic Hgb goal 10    # Afib s/p  "ablation 6/2017   Atrial fibrillation with RVR noted on telemetry 8/28 - confirmed on EKG. Asymptomatic and hemodynamically stable. Was followed by cardiology and determined okay to be off warfarin. No issues since ablation - possibly due to stretch from hypervolemia. Converted into NSR overnight.   - on PTA metoprolol  - continued diuresis as above  - continue telemetry      # CKD-BMD, secondary hyperPTH, VitaminD deficient  - continue phoslo, vitaminD, calcium carbonate  - last ferritin 242, continue ferrous supplement     # HTN  Was on decreased dose of metoprolol. MAPS have been creeping up over admission in setting of holding PTA amlodipine.   - continue PTA metoprolol 25   - restart amlodipine 5mg      # Decreased acuity of hearing on left  Likely secondary to eustachian tube dysfunction, in setting of nasal congestion. Complains of difficulty hearing on left, acute change overnight. Clear fluid present on examination behind TM on left, no s/s infection; exam otherwise benign. No visual changes, pain.  - Continue to monitor  - Consider nasal fluticasone if persists    # DM2  Diet control, likely because of deteriorating renal function. 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 However, platelets 212 on same date, and have been within the range of normal for this entire admission (250 on 8/29). No new thrombosis, no skin necrosis, and no systemic reaction documented; unclear rationale for why HIT lab was ordered. Based on this, would not have concerns about giving heparin in the future.    Chronic problems  # Vertical Diplopia: no change currently  # CAD s/p " CABG : continue ASA, pravastatin  # Chronic diarrhea : check c diff ordered 8/28 for loose stool  # Gluteal cyst : stable, no I&D per last surgery     FEN: renal diet  DVT Prophylaxis:  SCD  Disposition: PT/OT, likely return to TCU  Code Status: FULL    Patient discussed with attending physician Dr. Zhao, who agrees with the above assessment and plan.     Mike Tadeo MD  PGY-2, Internal Medicine  [facility]  Pager: [phone]  ___________________________________________________________________    Subjective & Interval Hx:    No acute events overnight.  Endorses ongoing intermittent cough.  Denies shortness of breath, chest pain or any other concerns.  Eager to go to TCU.    Last 24 hr care team notes reviewed.   ROS:  4 point ROS including Respiratory, CV, GI and  (other than that noted in the HPI) is negative    Medications:  Reviewed in EPIC. List below for reference    Physical Exam:  Blood pressure 142/71, pulse 80, temperature 97.5  F (36.4  C), temperature source Oral, resp. rate 18, weight 74.7 kg (164 lb 11.2 oz), SpO2 94 %.    I/O last 3 completed shifts:  In: 240 [P.O.:240]  Out: 3375 [Urine:3375]    General: AAOx3, pleasantly interactive  HEENT:  PERRL, EOMI, MMM.    Cardiac: RRR, Normal S1, S2.  Pulm: occasional wheezes,  Bilateral crackles, R>L, improved from yesterday's examination  Skin: No rash  MSK: no joint swelling, bilateral LE edema, improved from yesterday's examination  Psych: normal mood, full affect    Labs & Studies of Note:   Labs and studies reviewed in EPIC    Medications list for Reference:   Current Facility-Administered Medications   Medication     torsemide (DEMADEX) tablet 20 mg     ipratropium - albuterol 0.5 mg/2.5 mg/3 mL (DUONEB) neb solution 3 mL     calcium carb 1250 mg (500 mg [place])/vitamin D 200 units (OSCAL with D) per tablet 1 tablet     guaiFENesin-dextromethorphan (ROBITUSSIN DM) 100-10 MG/5ML syrup 5 mL     amLODIPine (NORVASC) tablet 5 mg     pramipexole (MIRAPEX)  tablet 1 mg     melatonin tablet 1 mg     metoprolol (LOPRESSOR) tablet 25 mg     acetaminophen (TYLENOL) tablet 650 mg     aspirin EC EC tablet 81 mg     calcium acetate (PHOSLO) capsule 667 mg     cholecalciferol (vitamin D) tablet 2,000 Units     ferrous sulfate (IRON) tablet 325 mg     lactobacillus rhamnosus (GG) (CULTURELL) capsule 1 capsule     pentoxifylline (TRENtal) CR tablet 400 mg     pravastatin (PRAVACHOL) tablet 40 mg     sodium bicarbonate tablet 1,300 mg     naloxone (NARCAN) injection 0.1-0.4 mg     senna-docusate (SENOKOT-S;PERICOLACE) 8.6-50 MG per tablet 1-2 tablet     ondansetron (ZOFRAN-ODT) ODT tab 4 mg    Or     ondansetron (ZOFRAN) injection 4 mg     prochlorperazine (COMPAZINE) injection 5 mg    Or     prochlorperazine (COMPAZINE) tablet 5 mg    Or     prochlorperazine (COMPAZINE) Suppository 12.5 mg     glucose 40 % gel 15-30 g    Or     dextrose 50 % injection 25-50 mL    Or     glucagon injection 1 mg     insulin aspart (NovoLOG) inj (RAPID ACTING)     insulin aspart (NovoLOG) inj (RAPID ACTING)     ipratropium - albuterol 0.5 mg/2.5 mg/3 mL (DUONEB) neb solution 3 mL         Attending Attestation   This patient has been seen and evaluated by me, Benja Zhao MD.  I have discussed the patient and today's care plan with the house staff team and agree with the findings and plan in this note and any edits by me are indicated above in blue.      I have reviewed today's care team notes, Medications, Vital Signs, Labs and Imagingd    Benja Zhao MD  Med-Peds Hospitalist  Pager [phone]

## 2017-08-31 NOTE — PLAN OF CARE
Problem: Goal Outcome Summary  Goal: Goal Outcome Summary  Outcome: Improving  The patient had a coughing episode that lasted about 30 minutes. The most effective intervention seemed to be repositioning the patient into an upright position. Other effective interventions were oxygen, oral suctioning per patient, and a Duoneb treatment. Lung sounds are clear. Cee is patent draining adequate leland urine. Continue with current plan of nursing care, and update MD with concerns as needed.

## 2017-08-31 NOTE — PLAN OF CARE
Problem: Goal Outcome Summary  Goal: Goal Outcome Summary  Outcome: Improving  Pt alert, forgetful at times. VSS except weaning off 02 has been up and down, pt needed 4 L NC overnight, down to 1 L to maintain sats this shift. Duoneb and rotisussin given for cough, oral suctioning done. IV bumex switched to oral diuretic. Cee d/c at 1300. Dressings to BLE changed this am per WOC RN orders. Calcium 5.7, medicine team aware, adjustments made to oral calcium orders. Plan to tx back to [facility] when medically stable. Will continue to monitor pt status.

## 2017-08-31 NOTE — PLAN OF CARE
Problem: Goal Outcome Summary  Goal: Goal Outcome Summary  Outcome: No Change  VSS on 2L O2.  Attempted to wean pt off oxygen but unable to maintain sats >90% on RA.  Pt walked with SBA down the harmon.  Robitussin given x1 for cough before bed.  Good appetite.  Cee patent with large amounts out.  Continue to monitor per POC.

## 2017-09-01 ENCOUNTER — APPOINTMENT (OUTPATIENT)
Dept: OCCUPATIONAL THERAPY | Facility: CLINIC | Age: 77
DRG: 291 | End: 2017-09-01
Payer: MEDICARE

## 2017-09-01 ENCOUNTER — CARE COORDINATION (OUTPATIENT)
Dept: CARE COORDINATION | Facility: CLINIC | Age: 77
End: 2017-09-01

## 2017-09-01 ENCOUNTER — APPOINTMENT (OUTPATIENT)
Dept: PHYSICAL THERAPY | Facility: CLINIC | Age: 77
DRG: 291 | End: 2017-09-01
Payer: MEDICARE

## 2017-09-01 VITALS
RESPIRATION RATE: 16 BRPM | TEMPERATURE: 97.7 F | WEIGHT: 161.4 LBS | SYSTOLIC BLOOD PRESSURE: 106 MMHG | OXYGEN SATURATION: 95 % | DIASTOLIC BLOOD PRESSURE: 50 MMHG | BODY MASS INDEX: 27.7 KG/M2 | HEART RATE: 69 BPM

## 2017-09-01 LAB
ANION GAP SERPL CALCULATED.3IONS-SCNC: 8 MMOL/L (ref 3–14)
BACTERIA SPEC CULT: NO GROWTH
BACTERIA SPEC CULT: NO GROWTH
BASOPHILS # BLD AUTO: 0.1 10E9/L (ref 0–0.2)
BASOPHILS NFR BLD AUTO: 1.4 %
BUN SERPL-MCNC: 37 MG/DL (ref 7–30)
CALCIUM SERPL-MCNC: 6.1 MG/DL (ref 8.5–10.1)
CHLORIDE SERPL-SCNC: 106 MMOL/L (ref 94–109)
CO2 SERPL-SCNC: 31 MMOL/L (ref 20–32)
CREAT SERPL-MCNC: 4.78 MG/DL (ref 0.52–1.04)
DIFFERENTIAL METHOD BLD: ABNORMAL
EOSINOPHIL # BLD AUTO: 0.2 10E9/L (ref 0–0.7)
EOSINOPHIL NFR BLD AUTO: 3.1 %
ERYTHROCYTE [DISTWIDTH] IN BLOOD BY AUTOMATED COUNT: 19.6 % (ref 10–15)
GFR SERPL CREATININE-BSD FRML MDRD: 9 ML/MIN/1.7M2
GLUCOSE BLDC GLUCOMTR-MCNC: 115 MG/DL (ref 70–99)
GLUCOSE BLDC GLUCOMTR-MCNC: 134 MG/DL (ref 70–99)
GLUCOSE BLDC GLUCOMTR-MCNC: 135 MG/DL (ref 70–99)
GLUCOSE BLDC GLUCOMTR-MCNC: 85 MG/DL (ref 70–99)
GLUCOSE SERPL-MCNC: 101 MG/DL (ref 70–99)
HCT VFR BLD AUTO: 27.9 % (ref 35–47)
HGB BLD-MCNC: 8.2 G/DL (ref 11.7–15.7)
IMM GRANULOCYTES # BLD: 0.1 10E9/L (ref 0–0.4)
IMM GRANULOCYTES NFR BLD: 1 %
LYMPHOCYTES # BLD AUTO: 1.3 10E9/L (ref 0.8–5.3)
LYMPHOCYTES NFR BLD AUTO: 24.6 %
MCH RBC QN AUTO: 25.9 PG (ref 26.5–33)
MCHC RBC AUTO-ENTMCNC: 29.4 G/DL (ref 31.5–36.5)
MCV RBC AUTO: 88 FL (ref 78–100)
MONOCYTES # BLD AUTO: 0.4 10E9/L (ref 0–1.3)
MONOCYTES NFR BLD AUTO: 7.5 %
NEUTROPHILS # BLD AUTO: 3.2 10E9/L (ref 1.6–8.3)
NEUTROPHILS NFR BLD AUTO: 62.4 %
NRBC # BLD AUTO: 0 10*3/UL
NRBC BLD AUTO-RTO: 0 /100
PLATELET # BLD AUTO: 265 10E9/L (ref 150–450)
POTASSIUM SERPL-SCNC: 3.5 MMOL/L (ref 3.4–5.3)
RBC # BLD AUTO: 3.17 10E12/L (ref 3.8–5.2)
SODIUM SERPL-SCNC: 145 MMOL/L (ref 133–144)
SPECIMEN SOURCE: NORMAL
SPECIMEN SOURCE: NORMAL
WBC # BLD AUTO: 5.1 10E9/L (ref 4–11)

## 2017-09-01 PROCEDURE — A9270 NON-COVERED ITEM OR SERVICE: HCPCS | Mod: GY | Performed by: STUDENT IN AN ORGANIZED HEALTH CARE EDUCATION/TRAINING PROGRAM

## 2017-09-01 PROCEDURE — 97535 SELF CARE MNGMENT TRAINING: CPT | Mod: GO

## 2017-09-01 PROCEDURE — 25000132 ZZH RX MED GY IP 250 OP 250 PS 637: Mod: GY | Performed by: STUDENT IN AN ORGANIZED HEALTH CARE EDUCATION/TRAINING PROGRAM

## 2017-09-01 PROCEDURE — 25000132 ZZH RX MED GY IP 250 OP 250 PS 637: Mod: GY | Performed by: INTERNAL MEDICINE

## 2017-09-01 PROCEDURE — 85025 COMPLETE CBC W/AUTO DIFF WBC: CPT | Performed by: STUDENT IN AN ORGANIZED HEALTH CARE EDUCATION/TRAINING PROGRAM

## 2017-09-01 PROCEDURE — 40000275 ZZH STATISTIC RCP TIME EA 10 MIN

## 2017-09-01 PROCEDURE — 00000146 ZZHCL STATISTIC GLUCOSE BY METER IP

## 2017-09-01 PROCEDURE — 40000133 ZZH STATISTIC OT WARD VISIT

## 2017-09-01 PROCEDURE — 36415 COLL VENOUS BLD VENIPUNCTURE: CPT | Performed by: STUDENT IN AN ORGANIZED HEALTH CARE EDUCATION/TRAINING PROGRAM

## 2017-09-01 PROCEDURE — S0169 CALCITROL: HCPCS | Mod: GY | Performed by: STUDENT IN AN ORGANIZED HEALTH CARE EDUCATION/TRAINING PROGRAM

## 2017-09-01 PROCEDURE — 97530 THERAPEUTIC ACTIVITIES: CPT | Mod: GP | Performed by: PHYSICAL THERAPIST

## 2017-09-01 PROCEDURE — 99238 HOSP IP/OBS DSCHRG MGMT 30/<: CPT | Mod: GC | Performed by: INTERNAL MEDICINE

## 2017-09-01 PROCEDURE — 40000193 ZZH STATISTIC PT WARD VISIT: Performed by: PHYSICAL THERAPIST

## 2017-09-01 PROCEDURE — 94660 CPAP INITIATION&MGMT: CPT

## 2017-09-01 PROCEDURE — 80048 BASIC METABOLIC PNL TOTAL CA: CPT | Performed by: STUDENT IN AN ORGANIZED HEALTH CARE EDUCATION/TRAINING PROGRAM

## 2017-09-01 PROCEDURE — A9270 NON-COVERED ITEM OR SERVICE: HCPCS | Mod: GY | Performed by: INTERNAL MEDICINE

## 2017-09-01 RX ORDER — FLUTICASONE PROPIONATE 50 MCG
1 SPRAY, SUSPENSION (ML) NASAL DAILY
Qty: 1 BOTTLE | Refills: 0 | DISCHARGE
Start: 2017-09-01 | End: 2018-04-03

## 2017-09-01 RX ORDER — TORSEMIDE 5 MG/1
20 TABLET ORAL 2 TIMES DAILY
DISCHARGE
Start: 2017-09-01 | End: 2017-09-11

## 2017-09-01 RX ORDER — WARFARIN SODIUM 2.5 MG/1
2.5 TABLET ORAL DAILY
Qty: 30 TABLET | Refills: 0 | Status: SHIPPED | OUTPATIENT
Start: 2017-09-01 | End: 2017-09-06

## 2017-09-01 RX ORDER — FLUTICASONE PROPIONATE 50 MCG
1 SPRAY, SUSPENSION (ML) NASAL DAILY
Status: DISCONTINUED | OUTPATIENT
Start: 2017-09-01 | End: 2017-09-01 | Stop reason: HOSPADM

## 2017-09-01 RX ORDER — GUAIFENESIN/DEXTROMETHORPHAN 100-10MG/5
5 SYRUP ORAL EVERY 8 HOURS PRN
Qty: 560 ML | Refills: 0 | Status: ON HOLD | DISCHARGE
Start: 2017-09-01 | End: 2017-10-18

## 2017-09-01 RX ORDER — CALCITRIOL 0.5 UG/1
0.5 CAPSULE, LIQUID FILLED ORAL DAILY
Status: ON HOLD | DISCHARGE
Start: 2017-09-01 | End: 2017-10-18

## 2017-09-01 RX ADMIN — FERROUS SULFATE TAB 325 MG (65 MG ELEMENTAL FE) 325 MG: 325 (65 FE) TAB at 09:33

## 2017-09-01 RX ADMIN — PENTOXIFYLLINE 400 MG: 400 TABLET, FILM COATED, EXTENDED RELEASE ORAL at 09:34

## 2017-09-01 RX ADMIN — ASPIRIN 81 MG: 81 TABLET, COATED ORAL at 09:33

## 2017-09-01 RX ADMIN — TORSEMIDE 20 MG: 20 TABLET ORAL at 09:33

## 2017-09-01 RX ADMIN — CALCIUM ACETATE 667 MG: 667 CAPSULE ORAL at 09:34

## 2017-09-01 RX ADMIN — SODIUM BICARBONATE 650 MG TABLET 1300 MG: at 09:33

## 2017-09-01 RX ADMIN — METOPROLOL TARTRATE 25 MG: 25 TABLET ORAL at 09:33

## 2017-09-01 RX ADMIN — AMLODIPINE BESYLATE 5 MG: 5 TABLET ORAL at 09:33

## 2017-09-01 RX ADMIN — PRAVASTATIN SODIUM 40 MG: 20 TABLET ORAL at 09:33

## 2017-09-01 RX ADMIN — Medication 1 CAPSULE: at 12:56

## 2017-09-01 RX ADMIN — CALCIUM ACETATE 667 MG: 667 CAPSULE ORAL at 12:57

## 2017-09-01 RX ADMIN — CALCITRIOL 0.5 MCG: 0.5 CAPSULE, LIQUID FILLED ORAL at 09:33

## 2017-09-01 RX ADMIN — CALCIUM CARBONATE-VITAMIN D TAB 500 MG-200 UNIT 1 TABLET: 500-200 TAB at 09:34

## 2017-09-01 RX ADMIN — FLUTICASONE PROPIONATE 1 SPRAY: 50 SPRAY, METERED NASAL at 12:56

## 2017-09-01 NOTE — TELEPHONE ENCOUNTER
Still admitted.     Call date: 9/6    Yesy Samaniego, PharmD, [facility]  [phone]  September 1, 2017

## 2017-09-01 NOTE — PLAN OF CARE
Problem: Goal Outcome Summary  Goal: Goal Outcome Summary  PT/5b: pt desaturated to 87% at rest on room air; improvement in SpO2 > 90% on 2LPM O2 via NC. Pt c/o lightheadedness at rest, BP stable yet lower than pt's self reported baseline. Pt assisted back to bed w/ Ax1. No further therapy d/t dizziness.   Recommend pt disch to TCU to maximize her IND and activity tolerance.

## 2017-09-01 NOTE — DISCHARGE SUMMARY
[facility]    Internal Medicine Discharge Summary- [facility]    Date of Admission:  8/26/2017  Date of Discharge:  9/1/2017  Discharging Attending Provider: El Zhao MD  Discharge Team: [facility] 1    Discharge Diagnoses   Acute on chronic diastolic heart failure with preserved ejection fraction  Acute on chronic kidney injury  Atrial fibrillation with rapid ventricular rate  Asymptomatic chronic hypocalcemia  Decreased hearing in left ear secondary to eustachian tube dysfunction  Chronic venous ulcer     Follow-ups Needed After Discharge   Follow up with [facility] clinic in 2 weeks.  Follow up with vascular surgery and dermatology as previously scheduled.  Follow up with nephrology as previously scheduled.    Hospital Course   Kathryn Banks was admitted on 8/26/2017 for acute decompensated diastolic heart failure.  The following problems were addressed during her hospitalization:    # Acute on chronic diastolic heart failure with preserved ejection fraction (EF 60-65%)  # Acute on chronic kidney injury secondary to cardiorenal syndrome  Presented with cough and new oxygen requirement secondary to decompensated heart failure exacerbation.  Started on lasix 40 mg IV BID with notable improvement in volume and respiratory status.  Patient's home regimen of torsemide 20 mg PO was increased from Qday to BID this admission.  At time of discharge the patient required 2L NC to maintain sats >88% and she was therefore discharged to TCU with oxygen with intent to continue weaning.  Nephrology was consulted during admission and noted patient is following with Dr. Barfield as outpatient; dialysis discussion is already ongoing and there was no acute indication for dialysis this admission.    - Increase torsemide to 20 mg PO BID  - Continue amlodipine, aspirin, metoprolol, pravastatin  - Not currently on ACE; consider starting once MARTÍN improves  - Continue guaifenesin for cough  -  "Follow up with [facility] clinic    # Atrial fibrillation with rapid ventricular rate  History of recent ablation.  Noted on telemetry 8/28, patient remained asymptomatic and hemodynamically stable.  Felt  likely due to stretch from hypervolemia.  Converted to normal sinus rhythm prior to discharge.  Given recurrent episode of atrial fibrillation will restart warfarin (discontinued earlier this month).  - Start warfarin with goal INR 2-3    # Asymptomatic chronic hypocalcemia  Discussed with nephrology this admission given ongoing hypocalcemia (corrected 7.5).  Chronic hypocalcemia has been evaluated in the past and felt secondary to CKD.  Switched to calcitriol this admission from cholecalciferol per nephrology.  - Start calcitriol  - Continue calcium carbonate BID    # Decreased hearing in left ear secondary to eustachian tube dysfunction  On exam noted clear fluid behind TM on the left without signs/symptoms of infection.  Likely secondary to eustachian tube dysfunction, in setting of nasal congestion.  Started nasal fluticasone this admission.  - Continue nasal fluticasone for congestion    # Chronic lower extremity venous ulcer  Present prior to admission.  Planned for follow up with dermatology and vascular surgery as arranged during last admission.    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 However, platelets 212 on same date, and have been within the range of normal for this entire admission (250 on 8/29). No new thrombosis, no skin necrosis, and no systemic reaction documented; unclear rationale for why HIT lab was ordered. Based on this, would not have concerns about giving heparin in the future.    Consultations This Hospital Stay   PHARMACY TO DOSE VANCO  PHYSICAL THERAPY ADULT IP CONSULT  OCCUPATIONAL THERAPY ADULT IP CONSULT  WOUND OSTOMY CONTINENCE NURSE  IP CONSULT  NEPHROLOGY GENERAL ADULT IP CONSULT  SWALLOW EVAL SPEECH PATH AT BEDSIDE " IP CONSULT  VASCULAR ACCESS CARE ADULT IP CONSULT  CORE CLINIC EVALUATION IP CONSULT  SOCIAL WORK IP CONSULT  MEDICATION HISTORY IP PHARMACY CONSULT     Code Status   Full Code     The patient was discussed with Dr. Zhao.    Mike Tadeo  [facility]  Pager: [phone]  ______________________________________________________________________    Physical Exam   Vital Signs: Temp: 97.7  F (36.5  C) Temp src: Oral BP: 106/50 Pulse: 69 Heart Rate: 69 Resp: 16 SpO2: 95 % O2 Device: Nasal cannula Oxygen Delivery: 2 LPM  Weight: 161 lbs 6.4 oz    General Appearance: Lying in bed, no acute distress  Respiratory: Non-productive cough noted, otherwise clear to auscultation bilaterally.  No crackles/wheezing appreciated  Cardiovascular: Regular rate and rhythm, normal S1/S2.  JVP not elevated  GI: Non-tender, non-distended, normoactive bowel sounds  Extremities: 1+ lower extremity edema in bilateral lower extremities.  Dark skin in bilateral lower extremities noted consistent with chronic venous statis    Significant Results and Procedures   Most Recent 3 CBC's:  Recent Labs   Lab Test  09/01/17   0637  08/31/17   0627  08/30/17   0924   WBC  5.1  5.4  6.8   HGB  8.2*  7.7*  8.9*   MCV  88  86  86   PLT  265  256  291     Most Recent 3 BMP's:  Recent Labs   Lab Test  09/01/17   0637  08/31/17   0627  08/30/17   0924   NA  145*  148*  145*   POTASSIUM  3.5  3.6  3.9   CHLORIDE  106  110*  111*   CO2  31  32  26   BUN  37*  38*  38*   CR  4.78*  4.84*  4.87*   ANIONGAP  8  6  9   MALICK  6.1*  5.7*  6.0*   GLC  101*  101*  186*       Pending Results   None       Primary Care Physician   Dheeraj Jj    Discharge Disposition   Discharged to short-term care facility  Condition at discharge: Fair    Discharge Orders     INR     Medication Therapy Management Referral     General info for SNF   Length of Stay Estimate: Short Term Care: Estimated # of Days <30  Condition at Discharge: Improving  Level of  care:skilled   Rehabilitation Potential: Good  Admission H&P remains valid and up-to-date: Yes  Recent Chemotherapy: N/A  Use Nursing Home Standing Orders: Yes     Mantoux instructions   Give two-step Mantoux (PPD) Per Facility Policy Yes     Reason for your hospital stay   You were hospitalized for difficulty breathing due to fluid in your lungs.  You were given medications to make you urinate and your lung fluid/breathing improved.     Intake and output   Every shift     Daily weights   Call Provider for weight gain of more than 2 pounds per day or 5 pounds per week.     Activity - Up ad beata     Follow Up and recommended labs and tests   Follow up with [facility] clinic in 2 weeks.  The following labs/tests are recommended: BMP.     Full Code     Advance Diet as Tolerated   Follow this diet upon discharge: Orders Placed This Encounter     Room Service     Renal Diet (non-dialysis)       Discharge Medications   Current Discharge Medication List      START taking these medications    Details   fluticasone (FLONASE) 50 MCG/ACT spray Spray 1 spray into both nostrils daily  Qty: 1 Bottle, Refills: 0    Associated Diagnoses: Nasal congestion      guaiFENesin-dextromethorphan (ROBITUSSIN DM) 100-10 MG/5ML syrup Take 5 mLs by mouth every 8 hours as needed for cough or congestion  Qty: 560 mL, Refills: 0    Associated Diagnoses: Cough      calcitRIOL (ROCALTROL) 0.5 MCG capsule Take 1 capsule (0.5 mcg) by mouth daily    Associated Diagnoses: Constitutional chronic hypocalcemia      warfarin (COUMADIN) 2.5 MG tablet Take 1 tablet (2.5 mg) by mouth daily  Qty: 30 tablet, Refills: 0    Associated Diagnoses: Atrial fibrillation with rapid ventricular response (H)         CONTINUE these medications which have CHANGED    Details   torsemide (DEMADEX) 5 MG tablet Take 4 tablets (20 mg) by mouth 2 times daily    Associated Diagnoses: Chronic diastolic congestive heart failure (H)         CONTINUE these medications which have NOT CHANGED     Details   Calcium Carb-Cholecalciferol 500-400 MG-UNIT TABS Take 1 tablet by mouth 2 times daily      CEPHALEXIN PO Take 500 mg by mouth 3 times daily X 10 days for UTI, started 8/25      ipratropium - albuterol 0.5 mg/2.5 mg/3 mL (DUONEB) 0.5-2.5 (3) MG/3ML neb solution Take 1 vial by nebulization 3 times daily      ACETAMINOPHEN PO Take 650 mg by mouth every 4 hours as needed for pain For pain 1-5 out of 10      TRAMADOL HCL PO Take 50 mg by mouth every 6 hours as needed for moderate to severe pain Pain of 6-10 out of 10      cyanocobalamin (VITAMIN B12) 1000 MCG/ML injection Inject 1 mL into the muscle every 30 days      aspirin 81 MG tablet Take 1 tablet (81 mg) by mouth daily  Qty: 30 tablet, Refills: 3    Associated Diagnoses: Coronary artery disease involving native coronary artery of native heart without angina pectoris      sodium bicarbonate 650 MG tablet Take 2 tablets (1,300 mg) by mouth 3 times daily    Associated Diagnoses: Chronic kidney disease, unspecified CKD stage      nitroGLYcerin (NITROSTAT) 0.4 MG sublingual tablet Place 1 tablet (0.4 mg) under the tongue every 5 minutes as needed for chest pain  Qty: 25 tablet, Refills: 0    Associated Diagnoses: Hx of non-ST elevation myocardial infarction (NSTEMI); Atherosclerosis of native coronary artery of native heart without angina pectoris; Postsurgical aortocoronary bypass status      gabapentin (NEURONTIN) 300 MG capsule Take 1 capsule (300 mg) by mouth At Bedtime  Qty: 90 capsule    Associated Diagnoses: Diabetic polyneuropathy associated with type 2 diabetes mellitus (H)      pravastatin (PRAVACHOL) 40 MG tablet Take 1 tablet (40 mg) by mouth daily  Qty: 90 tablet, Refills: 3    Associated Diagnoses: Hx of non-ST elevation myocardial infarction (NSTEMI); Atherosclerosis of native coronary artery of native heart without angina pectoris; Type 2 diabetes mellitus with other circulatory complications (H)      metoprolol (LOPRESSOR) 50 MG tablet  Take 0.5 tablets (25 mg) by mouth 2 times daily  Qty: 180 tablet, Refills: 3    Associated Diagnoses: Atrial fibrillation with rapid ventricular response (H)      amLODIPine (NORVASC) 5 MG tablet Take 2 tablets (10 mg) by mouth daily  Qty: 30 tablet, Refills: 3    Associated Diagnoses: Benign essential hypertension      ferrous sulfate (IRON) 325 (65 FE) MG tablet Take 1 tablet (325 mg) by mouth daily (with breakfast)  Qty: 100 tablet    Associated Diagnoses: Iron deficiency anemia secondary to inadequate dietary iron intake      calcium acetate (PHOSLO) 667 MG CAPS capsule Take 1 capsule (667 mg) by mouth 3 times daily (with meals)  Qty: 180 capsule    Associated Diagnoses: Chronic kidney disease, unspecified CKD stage      pentoxifylline (TRENTAL) 400 MG CR tablet Take 1 tablet (400 mg) by mouth daily    Associated Diagnoses: Venous stasis ulcer (H)      Lactobacillus (ACIDOPHILUS) tablet Take 1 tablet by mouth daily    Associated Diagnoses: Urinary tract infection without hematuria, site unspecified; Cellulitis of lower extremity, unspecified laterality      ORDER FOR DME Equipment being ordered: cpap machine, mask, humidifier, tubing and filters  Qty: 1 Device, Refills: 0    Associated Diagnoses: ANABEL (obstructive sleep apnea)         STOP taking these medications       cholecalciferol 2000 UNITS tablet Comments:   Reason for Stopping:         calcium carbonate-vitamin D 500-400 MG-UNIT TABS per tablet Comments:   Reason for Stopping:             Allergies   Allergies   Allergen Reactions     Ciprofloxacin Nausea and Vomiting     Zofran did not help     Oxycodone Visual Disturbance     Delusions, blackouts      Lisinopril Cough     Sulfa Drugs GI Disturbance     LOSS OF TASTE     Attending Attestation:  This patient has been seen and evaluated by me, El Zhao.  Discussed with the house staff team or resident(s) and agree with the findings and plan in this note and any edits by me are indicated above in  blue.      I have reviewed today's care team notes, Medications, Vital Signs and Labs.    Time spent on patient: 25 minutes total.    Please feel free to contact me with any questions at the number listed below.    Benja Zhao MD  Med-Peds Hospitalist  Cell: [phone]  Pager: [phone]

## 2017-09-01 NOTE — PLAN OF CARE
Problem: Goal Outcome Summary  Goal: Goal Outcome Summary  S=Pt getting up to use bathroom, just waking up.   B=Pt being treated for Respiratory distress and fluid overload.   A=VSS, up with 1 assist and walker. Calls appropriately, A&OX4. A bit forgetful. Repositions self in bed. O2 sats dip for several seconds at a time into the 80s but quickly resolves into the 90s, NC 2L continued.   R=Pt to d/c back to TCU prior to hospitalization once she is weaned from O2. Continue with POC. Notify MD team of any concerns.

## 2017-09-01 NOTE — PLAN OF CARE
Problem: Goal Outcome Summary  Goal: Goal Outcome Summary  OT/5B: Pt performed supine to sit and sit to stand and ambulated with walker with SBA. Completed standing g/h tasks at sink with intermittent rest breaks as needed. Reviewed EC/WS and pacing strategies to help manage fatigue-pt able to recall.      Per plan established by the OT, the recommendation for dc location is TCU to progress strength and activity tolerance for independent engagement in ADL's/IADL's.    .

## 2017-09-01 NOTE — PROGRESS NOTES
Patient has been assessed for Home Oxygen needs.  Oxygen readings:   *   RA - at rest  Pulse oximetry SPO2 88 %  *   RA - during activity/with exercise SPO2 86%  *   O2 at  2 liters/minute (at rest) SPO2 94 %  *   O2 at 2 liters/minute (during activity/with exercise) ...SPO2 92 %

## 2017-09-01 NOTE — PLAN OF CARE
Problem: Goal Outcome Summary  Goal: Goal Outcome Summary  Outcome: Improving  VSS. Pt shows intermittent forgetfulness/lethargy. Pt on 2 L 02. Denies any SOB or pain. Breath sounds are diminished. Lower extremities wounds covered, dressings clean and intact. PIV saline locked. Pts BG levels have been stable; pt required no insulin during the shift. Cath removed today. Pt was up to use the commode. She stooled and voided. PO intake fair. Pt complained of nausea at bedtime, refused medication; she did have a loose stool at this time. Continue with POC.

## 2017-09-01 NOTE — PROGRESS NOTES
Social Work Services Discharge Note      Patient Name:  Kathryn Banks     Anticipated Discharge Date:  9/1/2017    Discharge Disposition:   TCU: [facility]  Main: [phone], Admissions: [phone], Fax: [phone]   faxed DC orders     Pre-Admission Screening (PAS) online form has been completed.  The Level of Care (LOC) is:  Determined  Confirmation Code is:  [id number] completed 8/18/17  Patient/caregiver informed of referral to [facility] for Pre-Admission Screening for skilled nursing facility (SNF) placement and to expect a phone call post discharge from SNF.     Additional Services/Equipment Arranged:  Spouse will transport from [facility] to [facility], Kathryn is requiring new O2 and needs portable for transport-  made referral to  respiratory (per [facility])  [facility]-  to  at  office- confirmed     Patient / Family response to discharge plan:  in agreement. Facility of choice     Persons notified of above discharge plan:  Aurelia Law, bedside RN, patient, [facility] intake complete, spouse    Staff Discharge Instructions:  Please fax discharge orders and signed hard scripts for any controlled substances.  Please print a packet and send with patient.     CTS Handoff completed:  YES    Medicare Notice of Rights provided to the patient/family:  YES    SHANTELL Mtz  5B  (Medical/Surgical)  Phone: [phone]  Pager: [phone]

## 2017-09-01 NOTE — PROGRESS NOTES
Patient  here to drive wife back to tcu. Patient  brought portable tank to hospital. Patient had wound dressing changed. Iv removed. gathered all belongings.

## 2017-09-02 ENCOUNTER — TELEPHONE (OUTPATIENT)
Dept: GERIATRICS | Facility: CLINIC | Age: 77
End: 2017-09-02

## 2017-09-02 NOTE — PLAN OF CARE
Problem: Goal Outcome Summary  Goal: Goal Outcome Summary  Physical Therapy Discharge Summary     Reason for therapy discharge:    Discharged to transitional care facility.     Progress towards therapy goal(s). See goals on Care Plan in [facility] electronic health record for goal details.  Goals partially met.  Barriers to achieving goals:   limited tolerance for therapy.     Therapy recommendation(s):    Continued therapy is recommended.  Rationale/Recommendations:  Patient not at PLOF. Recommend PT at TCU to continue to progress towards functional goals. .

## 2017-09-02 NOTE — TELEPHONE ENCOUNTER
New admit, on Keflex for UTI - staff ? Whether this was trully given at the hospital. Reviewed records - appears it was given. Last day of therapy to be 9/3    Lab Results   Component Value Date    INR 1.25 08/27/2017    INR Unsatisfactory specimen - clotted 08/27/2017       Coumadin 2.5 mg PO daily - no INR check day. Asked staff to check on 9/3/17    Electronically signed by RHODA Vazquez, GNP

## 2017-09-03 ENCOUNTER — TELEPHONE (OUTPATIENT)
Dept: GERIATRICS | Facility: CLINIC | Age: 77
End: 2017-09-03

## 2017-09-03 NOTE — TELEPHONE ENCOUNTER
INR diagnosis a fib. INR today 1.2  Lab Results   Component Value Date    INR 1.25 08/27/2017      Coumadin 2.5 mg PO daily.     Coumadin 5 mg PO now and INR 9/4    Patient also gained 2.8 lbs weight gain overnight. Crackles in lungs. CHF, CKD diagnosis   On o2 sats 90% or more    Lab Results   Component Value Date    CR 4.78 09/01/2017    CR 4.84 08/31/2017     Fluid restriction - non-complaint with it. Staff to try and explain fluid restriction and importance to maintain it.    Give extra Demadex 10 mg PO today.     BMP on 9/5     Electronically signed by RHODA Vazquez GNP

## 2017-09-04 ENCOUNTER — TELEPHONE (OUTPATIENT)
Dept: GERIATRICS | Facility: CLINIC | Age: 77
End: 2017-09-04

## 2017-09-04 NOTE — TELEPHONE ENCOUNTER
Patient on Coumadin. INR today 1.3  Coumadin 5 mg taken on 9/3    PLAN  Coumadin 7.5 mg PO today and INR on 9/5    Electronically signed by RHODA Vazquez GNP

## 2017-09-05 ENCOUNTER — TELEPHONE (OUTPATIENT)
Dept: PHARMACY | Facility: OTHER | Age: 77
End: 2017-09-05

## 2017-09-05 ENCOUNTER — CARE COORDINATION (OUTPATIENT)
Dept: CARE COORDINATION | Facility: CLINIC | Age: 77
End: 2017-09-05

## 2017-09-05 ENCOUNTER — CARE COORDINATION (OUTPATIENT)
Dept: CARDIOLOGY | Facility: CLINIC | Age: 77
End: 2017-09-05

## 2017-09-05 NOTE — PROGRESS NOTES
48-hour post discharge to TCU     called [facility] in [facility].. Patient is on 2E at [phone]. Writer asked to speak to nurse; however, she was not available.  Another staff looked at schedule and they were not immediately aware of her appointment on 9/6; however, after reviewing discharge instructions, they do see that she has that appointment but they were not sure if she had transportation available; they state they will call writer back.  Writer asked them to include weight log if patient attends appointment. Johnr called back end of day to confirm if patient would attend C[facility] and spoke to nurse.  She confirms that patient will attend appointment tomorrow beginning at 7:30.

## 2017-09-05 NOTE — TELEPHONE ENCOUNTER
MTM referral from: Transitions of Care (recent hospital discharge or ED visit)    MTM referral outreach attempt #1 on September 5, 2017 at 2:01 PM      Outcome: Left Message    LUIS M Gomez Coordinator

## 2017-09-05 NOTE — PROGRESS NOTES
Patient has clinic visit tomorrow 9/6 so no post DC follow up call is needed          Sep 06, 2017  7:30 AM CDT   Lab with  LAB   [facility] ([facility])     [address] [place] [address] [id number] [phone]                 Sep 06, 2017  8:00 AM CDT   (Arrive by 7:45 AM)   CORE NEW with Destiny Berrios NP   [facility] ([facility])     [address] [place] [address] [id number] [phone]

## 2017-09-06 ENCOUNTER — TELEPHONE (OUTPATIENT)
Dept: PHARMACY | Facility: CLINIC | Age: 77
End: 2017-09-06

## 2017-09-06 ENCOUNTER — OFFICE VISIT (OUTPATIENT)
Dept: CARDIOLOGY | Facility: CLINIC | Age: 77
End: 2017-09-06
Attending: NURSE PRACTITIONER
Payer: MEDICARE

## 2017-09-06 ENCOUNTER — NURSING HOME VISIT (OUTPATIENT)
Dept: GERIATRICS | Facility: CLINIC | Age: 77
End: 2017-09-06
Payer: COMMERCIAL

## 2017-09-06 VITALS
DIASTOLIC BLOOD PRESSURE: 70 MMHG | WEIGHT: 167 LBS | HEIGHT: 64 IN | SYSTOLIC BLOOD PRESSURE: 137 MMHG | OXYGEN SATURATION: 93 % | HEART RATE: 71 BPM | BODY MASS INDEX: 28.51 KG/M2

## 2017-09-06 VITALS
WEIGHT: 165.4 LBS | BODY MASS INDEX: 28.39 KG/M2 | TEMPERATURE: 98.4 F | SYSTOLIC BLOOD PRESSURE: 121 MMHG | RESPIRATION RATE: 18 BRPM | OXYGEN SATURATION: 93 % | DIASTOLIC BLOOD PRESSURE: 70 MMHG | HEART RATE: 85 BPM

## 2017-09-06 DIAGNOSIS — N18.30 CHRONIC KIDNEY DISEASE, STAGE III (MODERATE) (H): ICD-10-CM

## 2017-09-06 DIAGNOSIS — D63.1 ANEMIA OF CHRONIC RENAL FAILURE, STAGE 4 (SEVERE) (H): ICD-10-CM

## 2017-09-06 DIAGNOSIS — E83.51 HYPOCALCEMIA: ICD-10-CM

## 2017-09-06 DIAGNOSIS — H91.92 DECREASED HEARING OF LEFT EAR: ICD-10-CM

## 2017-09-06 DIAGNOSIS — E11.59 TYPE 2 DIABETES MELLITUS WITH OTHER CIRCULATORY COMPLICATIONS (H): ICD-10-CM

## 2017-09-06 DIAGNOSIS — I50.30 DIASTOLIC HEART FAILURE (H): ICD-10-CM

## 2017-09-06 DIAGNOSIS — N18.4 ANEMIA OF CHRONIC RENAL FAILURE, STAGE 4 (SEVERE) (H): ICD-10-CM

## 2017-09-06 DIAGNOSIS — K52.9 CHRONIC DIARRHEA: ICD-10-CM

## 2017-09-06 DIAGNOSIS — R30.0 DYSURIA: ICD-10-CM

## 2017-09-06 DIAGNOSIS — I83.009 VENOUS ULCER (H): ICD-10-CM

## 2017-09-06 DIAGNOSIS — I50.32 CHRONIC DIASTOLIC HEART FAILURE (H): Primary | ICD-10-CM

## 2017-09-06 DIAGNOSIS — I25.10 ATHEROSCLEROSIS OF NATIVE CORONARY ARTERY OF NATIVE HEART WITHOUT ANGINA PECTORIS: ICD-10-CM

## 2017-09-06 DIAGNOSIS — L97.909 VENOUS ULCER (H): ICD-10-CM

## 2017-09-06 DIAGNOSIS — N18.4 CKD (CHRONIC KIDNEY DISEASE) STAGE 4, GFR 15-29 ML/MIN (H): ICD-10-CM

## 2017-09-06 DIAGNOSIS — N18.4 ACUTE RENAL FAILURE WITH ACUTE TUBULAR NECROSIS SUPERIMPOSED ON STAGE 4 CHRONIC KIDNEY DISEASE (H): ICD-10-CM

## 2017-09-06 DIAGNOSIS — E87.20 METABOLIC ACIDOSIS: ICD-10-CM

## 2017-09-06 DIAGNOSIS — N17.0 ACUTE RENAL FAILURE WITH ACUTE TUBULAR NECROSIS SUPERIMPOSED ON STAGE 4 CHRONIC KIDNEY DISEASE (H): ICD-10-CM

## 2017-09-06 DIAGNOSIS — J90 PLEURAL EFFUSION: ICD-10-CM

## 2017-09-06 DIAGNOSIS — N17.9 ACUTE KIDNEY INJURY (NONTRAUMATIC) (H): ICD-10-CM

## 2017-09-06 DIAGNOSIS — I48.91 ATRIAL FIBRILLATION, UNSPECIFIED TYPE (H): ICD-10-CM

## 2017-09-06 DIAGNOSIS — I48.91 ATRIAL FIBRILLATION WITH RAPID VENTRICULAR RESPONSE (H): ICD-10-CM

## 2017-09-06 DIAGNOSIS — I10 ESSENTIAL HYPERTENSION WITH GOAL BLOOD PRESSURE LESS THAN 140/90: ICD-10-CM

## 2017-09-06 DIAGNOSIS — E87.5 HYPERKALEMIA: ICD-10-CM

## 2017-09-06 DIAGNOSIS — R53.81 PHYSICAL DECONDITIONING: ICD-10-CM

## 2017-09-06 LAB
ALBUMIN SERPL-MCNC: 2 G/DL (ref 3.4–5)
ANION GAP SERPL CALCULATED.3IONS-SCNC: 7 MMOL/L (ref 3–14)
BUN SERPL-MCNC: 36 MG/DL (ref 7–30)
CALCIUM SERPL-MCNC: 6.6 MG/DL (ref 8.5–10.1)
CHLORIDE SERPL-SCNC: 114 MMOL/L (ref 94–109)
CO2 SERPL-SCNC: 26 MMOL/L (ref 20–32)
CREAT SERPL-MCNC: 4.67 MG/DL (ref 0.52–1.04)
GFR SERPL CREATININE-BSD FRML MDRD: 9 ML/MIN/1.7M2
GLUCOSE SERPL-MCNC: 102 MG/DL (ref 70–99)
HCT VFR BLD AUTO: 28.8 % (ref 35–47)
HGB BLD-MCNC: 8.6 G/DL (ref 11.7–15.7)
INR PPP: 2.11 (ref 0.86–1.14)
NT-PROBNP SERPL-MCNC: ABNORMAL PG/ML (ref 0–450)
PHOSPHATE SERPL-MCNC: 4.9 MG/DL (ref 2.5–4.5)
POTASSIUM SERPL-SCNC: 5.8 MMOL/L (ref 3.4–5.3)
SODIUM SERPL-SCNC: 147 MMOL/L (ref 133–144)

## 2017-09-06 PROCEDURE — 83880 ASSAY OF NATRIURETIC PEPTIDE: CPT | Performed by: INTERNAL MEDICINE

## 2017-09-06 PROCEDURE — 96374 THER/PROPH/DIAG INJ IV PUSH: CPT | Mod: ZF

## 2017-09-06 PROCEDURE — 85014 HEMATOCRIT: CPT | Performed by: INTERNAL MEDICINE

## 2017-09-06 PROCEDURE — 85610 PROTHROMBIN TIME: CPT | Performed by: INTERNAL MEDICINE

## 2017-09-06 PROCEDURE — 80069 RENAL FUNCTION PANEL: CPT | Performed by: INTERNAL MEDICINE

## 2017-09-06 PROCEDURE — 25000128 H RX IP 250 OP 636: Mod: ZF | Performed by: NURSE PRACTITIONER

## 2017-09-06 PROCEDURE — 36415 COLL VENOUS BLD VENIPUNCTURE: CPT | Performed by: INTERNAL MEDICINE

## 2017-09-06 PROCEDURE — 99310 SBSQ NF CARE HIGH MDM 45: CPT | Performed by: NURSE PRACTITIONER

## 2017-09-06 PROCEDURE — 0296T ZIO PATCH HOLTER: CPT | Mod: ZF | Performed by: NURSE PRACTITIONER

## 2017-09-06 PROCEDURE — 85018 HEMOGLOBIN: CPT | Performed by: INTERNAL MEDICINE

## 2017-09-06 PROCEDURE — 99214 OFFICE O/P EST MOD 30 MIN: CPT | Mod: 25 | Performed by: NURSE PRACTITIONER

## 2017-09-06 PROCEDURE — 40000141 ZZH STATISTIC PERIPHERAL IV START W/O US GUIDANCE: Mod: ZF

## 2017-09-06 PROCEDURE — 99213 OFFICE O/P EST LOW 20 MIN: CPT | Mod: ZF

## 2017-09-06 RX ORDER — FUROSEMIDE 10 MG/ML
40 INJECTION INTRAMUSCULAR; INTRAVENOUS ONCE
Status: COMPLETED | OUTPATIENT
Start: 2017-09-06 | End: 2017-09-06

## 2017-09-06 RX ADMIN — FUROSEMIDE 40 MG: 10 INJECTION, SOLUTION INTRAVENOUS at 09:35

## 2017-09-06 ASSESSMENT — PAIN SCALES - GENERAL: PAINLEVEL: NO PAIN (0)

## 2017-09-06 NOTE — PROGRESS NOTES
[facility]  PRIMARY CARE PROVIDER AND CLINIC:  Dheeraj Jj [facility] [address] [facility]  / JEFFERY KRAMER 5*  Chief Complaint   Patient presents with     Hospital F/U       HPI:    Kathryn Banks is a 77 year old  (1940),admitted to the [facility] from [facility].  Hospital stay 8/26/17 through 9/1/17.  Admitted to this facility for  rehab, medical management and nursing care.  HPI information obtained from: facility chart records, facility staff, patient report and [facility] chart review.      Kathryn Banks was admitted on 8/26/2017 for acute decompensated diastolic heart failure.  The following problems were addressed during her hospitalization:     # Acute on chronic diastolic heart failure with preserved ejection fraction (EF 60-65%)  # Acute on chronic kidney injury secondary to cardiorenal syndrome  Presented with cough and new oxygen requirement secondary to decompensated heart failure exacerbation.  Started on lasix 40 mg IV BID with notable improvement in volume and respiratory status.  Patient's home regimen of torsemide 20 mg PO was increased from Qday to BID this admission.  At time of discharge the patient required 2L NC to maintain sats >88% and she was therefore discharged to TCU with oxygen with intent to continue weaning.  Nephrology was consulted during admission and noted patient is following with Dr. Barfield as outpatient; dialysis discussion is already ongoing and there was no acute indication for dialysis this admission.    - Increase torsemide to 20 mg PO BID  - Continue amlodipine, aspirin, metoprolol, pravastatin  - Not currently on ACE; consider starting once MARTÍN improves  - Continue guaifenesin for cough  - Follow up with [facility]     # Atrial fibrillation with rapid ventricular rate  History of recent ablation.  Noted on telemetry 8/28, patient remained asymptomatic and hemodynamically stable.   "Felt  likely due to stretch from hypervolemia.  Converted to normal sinus rhythm prior to discharge.  Given recurrent episode of atrial fibrillation will restart warfarin (discontinued earlier this month).  - Start warfarin with goal INR 2-3     # Asymptomatic chronic hypocalcemia  Discussed with nephrology this admission given ongoing hypocalcemia (corrected 7.5).  Chronic hypocalcemia has been evaluated in the past and felt secondary to CKD.  Switched to calcitriol this admission from cholecalciferol per nephrology.  - Start calcitriol  - Continue calcium carbonate BID     # Decreased hearing in left ear secondary to eustachian tube dysfunction  On exam noted clear fluid behind TM on the left without signs/symptoms of infection.  Likely secondary to eustachian tube dysfunction, in setting of nasal congestion.  Started nasal fluticasone this admission.  - Continue nasal fluticasone for congestion     # Chronic lower extremity venous ulcer  Present prior to admission.  Planned for follow up with dermatology and vascular surgery as arranged during last admission.     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 However, platelets 212 on same date, and have been within the range of normal for this entire admission (250 on 8/29). No new thrombosis, no skin necrosis, and no systemic reaction documented; unclear rationale for why HIT lab was ordered. Based on this, would not have concerns about giving heparin in the future.      Current issues are:         Chronic diastolic heart failure (H)  Essential hypertension with goal blood pressure less than 140/90  Chronic kidney disease, stage III (moderate)  Atrial fibrillation with rapid ventricular response (H)  Hypocalcemia  Decreased hearing of left ear  Venous ulcer  Metabolic acidosis  Dysuria  Type 2 diabetes mellitus with other circulatory complications (H)  Pleural effusion  Anemia of chronic renal " failure, stage 4 (severe) (H)  Chronic diarrhea  Physical deconditioning  Hyperkalemia     Patient is known to this provider as she was previously here at this TCU, then went to the hospital for CHF exacerbation in setting of CKD4 and has now returned to this TCU for rehabilitation, nursing care, and medical management.  Patient was to see [facility] NP today as well.      Patient has a PMH of CHF, CKD4, venous stasis ulcers, hypocalcemia 2/2 renal failure, a fib, DM2, HTN, anemia 2/2 renal failure.      She is met today in her room at [facility] T[facility].  She denies SOB, CP, HA, lightheadedness, heartburn, upset stomach, pain.  She endorses persistent diarrhea.      CODE STATUS/ADVANCE DIRECTIVES DISCUSSION:   CPR/Full code   Patient's living condition: lives with spouse    ALLERGIES:Ciprofloxacin; Oxycodone; Lisinopril; and Sulfa drugs  PAST MEDICAL HISTORY:  has a past medical history of Carpal tunnel syndrome; Coronary atherosclerosis of native coronary artery (2006); OBSTRUCTIVE SLEEP APNEA; Osteoarthrosis, unspecified whether generalized or localized, unspecified site; Other and combined forms of senile cataract (2000); Other and unspecified hyperlipidemia; RESTLESS LEGS SYNDROME; TENOSYNOV HAND/WRIST NEC (6/30/2006); Type II or unspecified type diabetes mellitus without mention of complication, not stated as uncontrolled (2001); Typical atrial flutter (H) (01/17/2007); and Unspecified essential hypertension.  PAST SURGICAL HISTORY:  has a past surgical history that includes TOTAL ABDOM HYSTERECTOMY (1995); REMOVAL OF OVARY/TUBE(S); APPENDECTOMY; REVISE MEDIAN N/CARPAL TUNNEL SURG; SOTO W/O FACETEC FORAMOT/DSKC 1/2 VRT SEG, LUMBAR (1968); VAGOTOMY/PYLOROPLASTY,SELECT (1970); REMV CATARACT INTRACAP,INSERT LENS; COLONOSCOPY THRU STOMA, DIAGNOSTIC (10/7/04); endoscopy (03/21/2000); Colonoscopy w/wo [place] **Performed** (10/31/07); UGI ENDOSCOPY DIAG W BIOPSY (10/31/07); colonoscopy (12/3/2007); UPPER GI  ENDOSCOPY,EXAM (12/3/2007); cystoscopy (11/25/09); angioplasty; CABG, VEIN, FIVE (12/06); Colonoscopy (1/17/2014); and Open reduction internal fixation hip nailing (Left, 7/3/2016).  FAMILY HISTORY: family history includes Blood Disease in her father; DIABETES in her father, maternal grandmother, and paternal grandmother; Neurologic Disorder in her father. There is no history of Hypertension or CEREBROVASCULAR DISEASE.  SOCIAL HISTORY:  reports that she quit smoking about 48 years ago. She quit after 10.00 years of use. She has never used smokeless tobacco. She reports that she drinks alcohol. She reports that she does not use illicit drugs.    Post Discharge Medication Reconciliation Status: discharge medications reconciled, continue medications without change.  Current Outpatient Prescriptions   Medication Sig Dispense Refill     Warfarin Sodium (COUMADIN PO) Take by mouth daily Take as directed per INR results       fluticasone (FLONASE) 50 MCG/ACT spray Spray 1 spray into both nostrils daily 1 Bottle 0     guaiFENesin-dextromethorphan (ROBITUSSIN DM) 100-10 MG/5ML syrup Take 5 mLs by mouth every 8 hours as needed for cough or congestion 560 mL 0     torsemide (DEMADEX) 5 MG tablet Take 4 tablets (20 mg) by mouth 2 times daily       calcitRIOL (ROCALTROL) 0.5 MCG capsule Take 1 capsule (0.5 mcg) by mouth daily       Calcium Carb-Cholecalciferol 500-400 MG-UNIT TABS Take 1 tablet by mouth 2 times daily       ipratropium - albuterol 0.5 mg/2.5 mg/3 mL (DUONEB) 0.5-2.5 (3) MG/3ML neb solution Take 1 vial by nebulization 3 times daily       ACETAMINOPHEN PO Take 650 mg by mouth every 4 hours as needed for pain For pain 1-5 out of 10       TRAMADOL HCL PO Take 50 mg by mouth every 6 hours as needed for moderate to severe pain Pain of 6-10 out of 10       cyanocobalamin (VITAMIN B12) 1000 MCG/ML injection Inject 1 mL into the muscle every 30 days       aspirin 81 MG tablet Take 1 tablet (81 mg) by mouth daily 30 tablet  3     sodium bicarbonate 650 MG tablet Take 2 tablets (1,300 mg) by mouth 3 times daily       nitroGLYcerin (NITROSTAT) 0.4 MG sublingual tablet Place 1 tablet (0.4 mg) under the tongue every 5 minutes as needed for chest pain 25 tablet 0     gabapentin (NEURONTIN) 300 MG capsule Take 1 capsule (300 mg) by mouth At Bedtime 90 capsule      pravastatin (PRAVACHOL) 40 MG tablet Take 1 tablet (40 mg) by mouth daily 90 tablet 3     metoprolol (LOPRESSOR) 50 MG tablet Take 0.5 tablets (25 mg) by mouth 2 times daily 180 tablet 3     amLODIPine (NORVASC) 5 MG tablet Take 2 tablets (10 mg) by mouth daily 30 tablet 3     ferrous sulfate (IRON) 325 (65 FE) MG tablet Take 1 tablet (325 mg) by mouth daily (with breakfast) 100 tablet      calcium acetate (PHOSLO) 667 MG CAPS capsule Take 1 capsule (667 mg) by mouth 3 times daily (with meals) 180 capsule      pentoxifylline (TRENTAL) 400 MG CR tablet Take 1 tablet (400 mg) by mouth daily       Lactobacillus (ACIDOPHILUS) tablet Take 1 tablet by mouth daily       ORDER FOR DME Equipment being ordered: cpap machine, mask, humidifier, tubing and filters 1 Device 0       ROS:  10 point ROS of systems including Constitutional, Eyes, Respiratory, Cardiovascular, Gastroenterology, Genitourinary, Integumentary, Muscularskeletal, Psychiatric were all negative except for pertinent positives noted in my HPI.    Exam:  /70  Pulse 85  Temp 98.4  F (36.9  C)  Resp 18  Wt 165 lb 6.4 oz (75 kg)  SpO2 93%  BMI 28.39 kg/m2 GENERAL APPEARANCE:  Alert, in no distress, pleasant  RESP:  respiratory effort and palpation of chest normal, auscultation of lungs: right sides LS scant to mild rhonchi, left LS with mild to moderate wheezes and moderate rhonchi , no respiratory distress, on RA at time of visit but was wearing O2 this AM (she reports).   CV:  Palpation and auscultation of heart done , rate and rhythm regular today, no murmur, mild LE peripheral edema  ABDOMEN:  normal bowel sounds,  soft, nontender, no hepatosplenomegaly or other masses  M/S:   Gait and station with WC mobility at today's visit, Digits and nails with arthritic changes, reduced muscle mass  SKIN:  Inspection and Palpation of skin and subcutaneous tissue pale, thin, fragile (LE venous stasis ulcers not present at today's visit d/t patient declining/positioning inopertune  PSYCH:  insight and judgement, memory intact , affect and mood normal, follows commands readily         Lab/Diagnostic data:     CBC RESULTS:   Recent Labs   Lab Test  09/06/17   0741  09/01/17   0637 08/31/17   0627   WBC   --   5.1  5.4   RBC   --   3.17*  3.02*   HGB  8.6*  8.2*  7.7*   HCT  28.8*  27.9*  26.0*   MCV   --   88  86   MCH   --   25.9*  25.5*   MCHC   --   29.4*  29.6*   RDW   --   19.6*  20.2*   PLT   --   265  256       Last Basic Metabolic Panel:  Recent Labs   Lab Test  09/06/17 0741 09/01/17   0637   NA  147*  145*   POTASSIUM  5.8*  3.5   CHLORIDE  114*  106   MALICK  6.6*  6.1*   CO2  26  31   BUN  36*  37*   CR  4.67*  4.78*   GLC  102*  101*     BASIC METAB PROFILE   9/5/17  SODIUM 146 mmol/L 136-145 H Final  POTASSIUM 4.8 mmol/L 3.5-5.1 Final  CHLORIDE 110 mmol/L  Final  CARBON DIOXIDE 25 mmol/L 21-32 Final  BUN (UREA NITRO) 33 mg/dL 7-24 H Final  CREATININE 4.99 mg/dL 0.55-1.02 H Final  EST GFR (MDRD) 8 mL/min >60 L Final  EST GFR IF AFRICAN AM 10 mL/min >60 L Final  GLUCOSE 140 mg/dL  H Final  CALCIUM, SERUM 6.7 mg/dL 8.5-10.1 LL Final      Liver Function Studies -   Recent Labs   Lab Test  09/06/17   0741  08/27/17   0836  08/27/17   0607  08/26/17   0637   08/12/17   1616   PROTTOTAL   --    --    --   5.2*   --   4.7*   ALBUMIN  2.0*   --   1.8*  1.8*   < >  1.9*   BILITOTAL   --   0.2   --   0.3   --   0.2   ALKPHOS   --    --    --   292*   --   216*   AST   --    --    --   34   --   32   ALT   --    --    --   80*   --   44    < > = values in this interval not displayed.       TSH   Date Value Ref Range Status    04/28/2017 0.75 0.40 - 4.00 mU/L Final   01/10/2017 1.03 0.40 - 4.00 mU/L Final   ]    Lab Results   Component Value Date    A1C 7.3 06/22/2017    A1C 6.2 03/31/2017       ASSESSMENT/PLAN:  Chronic diastolic heart failure (H)  Essential hypertension with goal blood pressure less than 140/90  Cardio-Renal status tenuous  Patient recently in hospital for CHF exacerbation, given IV Lasix 40 mg BID with improvement.      Currently returned on torsemide 20 mg BID (prev off).  Remains on amlodipine 10 mg daily, ASA 81 mg daily,  Metoprolol 25 mg BID, pravastatin 40 mg daily.     BPs 120-140s/70-80s  HRs 80s    Patient denies CP, HA, lightheadedness     Weights prior to hospitalization 180s --> now 160s, although on the rise:  9/4: 162.4  9/5: 165.4  9/6: 167.4    Patient visited today in [facility] (Destiny Berrios NP) and labs drawn, reviewed and called [facility] to discuss labs. NP returned call and agreed to give one-time dose of metolazone tomorrow before torsemide med and [facility] NP also contacting nephrology for advise on ability to move up appt with her as potentially patient needs dialysis for fluid removal.     LS today with rhonchi in left.  Has history of pleural effusions with fluid overload. Patient denies SOB and notes sats are 93+% on RA.     ProBNP today - 18,778    Chronic kidney disease, stage III (moderate)  Metabolic acidosis  Previous hospitalization for worsening labs, no HD done at that time  F/u with Dr. Barfield, Nephrology on 8/22/17 and discussion regarding dialysis potential.    Since then patient hospitalized again for CHF exacerbation and now seeing [facility] NP   Patient returned on torsemide will be giving one time dose of metolazone tomorrow AM (See POC above)   Goal to discuss POC with Dr. Barfield, Nephrology for potential to see patient early or for recommendations on management.  Appreciate recommendations.   F/u appt with Dr. Barfield - 9/19/17.     On NaHCO3 1300 mg  TID - last CO2 (today, 17) - 26  Per records - baseline CO2 is 21   Will monitor for need to titrate.   Stable for now on TID dosing.     17: BUN 36, Creat 4.67, GFR 9, Phos 4.9    Atrial fibrillation with rapid ventricular response (H)  F/y Dr. King, Cardiology (last visit 8/10/17)  S/p ablation 2017  Coumadin restarted after afib noted on telemetry during this last hospitalization.    Thought likely related to hypervolemia.  Converted to NSR by DC - on Metoprolol 25 mg BID.  Zio patch ordered for monitoring. - patient wearing today on left chest    HR regular today  INR today 2.2  Coumadin dosin/1 - 2.5   - 2.5  3 - 5mg   - 7.5 mg   - 7.5 mg    Will dose Coumadin for next 2 days and recheck INR.      Hypocalcemia  Chronic from renal failure. Corrected calcium 7.5 during hospitalization.  Ca level today 6.6 (8.2 corrected today)  Added Calcitriol 0.5 mcg last visit  Remains on calcium acetate and calcium carbonate.  Likely can DC calcium acetate but will await corrected calcium to be closer to normal range and then can DC.    Also appreciate Nephrology recommendation.     Remains on Vit D 2000 units daily.     Hyperkalemia  K 5.8 on today's labs  Patient given IV Lasix at today's clinic visit.  Also agreed to give patient Metolazone tomorrow AM before torsemide dose  Will order kayexalate x 1 dose to assist with potassium binding.     Decreased hearing of left ear  Nasal fluticasone started last hospitalization for clear fluid noted behind TM, likely 2/2 eustachion tube dysfucntion  Patient noting improvement and now able to hear better  Stable.     Venous ulcer  venous stasis ulcers. Vascular Surgery consulted during previous hospitalization and noted ultimately no microvascular disease.    Wound Care and Dermatology followed in hospital.   Patient noting improvement in ulcers  Will monitor.   Continues pentoxifylline 400 gm daily  HUC to please help with scheduling Dermatology and Vascular  appts.     Dysuria  Remains on cephalexin x 2 more days to finish 10 day course  Patient denies symptoms  Will monitor.     Type 2 diabetes mellitus with other circulatory complications (H)  Lab Results   Component Value Date    A1C 7.3 06/22/2017    A1C 6.2 03/31/2017    A1C 6.6 07/03/2016    A1C 7.1 04/12/2016    A1C 6.0 06/08/2015   On no meds for DM2  On ASA, statin, no ACEI d/t CKD4    Blood glucose levels:  AMs: 109-211  PM: 189-411  Patient noted drinking juice and having milk shake  Will monitor.     Neuropathy in bilat heels (and RLS) - takes gabapentin 300 mg qHS      Pleural effusion  Likely 2/2 fluid overload  Was given IV Lasix while in the hospital and also was given 40 mg IV Lasix today  LS today with rhonchi bilat L>R, left sided mild wheeze.  See above for POC with diuretics.    Anemia of chronic renal failure, stage 4 (severe) (H)  Baseline Hgb 8-9  8/22/17 - Hgb 7.8 - Nephrologist consulted [facility] (Yesy Samaniego)  On Fe Supplements 325 mg daily.  Hemoglobin   Date Value Ref Range Status   09/06/2017 8.6 (L) 11.7 - 15.7 g/dL Final       Chronic diarrhea  Potential 2/2 antibiotics which are nearly complete.  c diff neg while in hospital  Will monitor.     Physical deconditioning  Multiple hospitalizations and TCU stays  Physical Therapy/ Occupational Therapy for rehab     Progress report from therapy:  Petros 17 prev --> now 15  SLUMS 28  Ambulating 250 ft with 4WW - limited by fatigue  SBA for ADLs.        Orders:  1. INR 2.2 Coumadin 5mg today, 2.5mg tomorrow(9/7/17)   2. Recheck INR Friday 9/8/17 Dx: afib  3.cancel Dr. Jj Appt on 9/15/17  4. Please schedule dermatology and vascular surgery appt. 1st choice- [facility], 2nd choice [facility] - ASAP/ First availible. Dx: Venous Stasis ulcers  5.Bmp 9/8/17 with results faxed to [phone]  6. Kayexalate 15 gm po x 1 dose. Dx: hyperkalemia  7. Metolazone 2.5 mg po 30 min prior to AM torsemide dose on 9/7/17. Dx: CHF.      Electronically signed by:  RHODA Bowden CNP

## 2017-09-06 NOTE — PROGRESS NOTES
Clinic Care Coordination Contact  Care Coordination Transition Communication    Referral Source: CTS    Clinical Data: Patient was hospitalized at [facility] from 8/26 to 9/1 with diagnosis of respiatory distress.     Transition to Facility:              Facility Name: [facility] in [place]               Contact name and phone number/fax:   [phone]    Plan: RN/SW Care Coordinator will await notification from facility staff informing RN/SW Care Coordinator of patient's discharge plans/needs. RN/SW Care Coordinator will review chart and outreach to facility staff every 4 weeks and as needed.     Meenakshi Larsen RN, BSN  Care Coordination    [address] [address]  Office: [phone]  Fax [phone]   [email or web address]   [email or web address]     Connect with [facility] on social media.

## 2017-09-06 NOTE — PROGRESS NOTES
HPI:  Kathryn is a 77 year old female with HFpEF, CAD with CABG in , CKD stage 4, memory loss, obesity, ANABEL, and  DM II, who was recently hospitalized at [facility] from - for acute decompensated diastolic HF, acute on chronic kidney injury, and rapid atrial fibrillation.     Since discharge, she states she continues to feel congested.  She is wearing CPAP nightly. Is currently at [facility] for approximately two more weeks for more therapy and help with balance issues.  She notes SOB after walking about 70 feet with a walker and  needs to stop a couple of times to rest.  Positive 2+ pillow orthopnea and lower extemity edema.  Weight is up 6 lbs.  Denies SOB at rest, PND, chest pain, palpitations, lightheadedness, dizziness, near syncopal/syncopal episodes.  Notes a wet cough and continues to cough up light green color sputum.  Denies fever or recent illness.       PAST MEDICAL HISTORY:  Past Medical History:   Diagnosis Date     Carpal tunnel syndrome      Coronary atherosclerosis of native coronary artery     5 vessel CABG     OBSTRUCTIVE SLEEP APNEA     using CPAP     Osteoarthrosis, unspecified whether generalized or localized, unspecified site     generalized arthritis, particularly in her hands and feet         Other and combined forms of senile cataract     Bilateral     Other and unspecified hyperlipidemia      RESTLESS LEGS SYNDROME      TENOSYNOV HAND/WRIST NEC 2006     Type II or unspecified type diabetes mellitus without mention of complication, not stated as uncontrolled     Diabetes mellitus/pt is diet controlled with weight loss     Typical atrial flutter (H) 2007    ablation 2017     Unspecified essential hypertension        FAMILY HISTORY:  Family History   Problem Relation Age of Onset     DIABETES Father      AODM     Neurologic Disorder Father      Parkinson's     Blood Disease Father       from blood clot from leg to lung immediately after hip fracture      DIABETES Paternal Grandmother      adult onset     DIABETES Maternal Grandmother      adult onset     Hypertension No family hx of      CEREBROVASCULAR DISEASE No family hx of        SOCIAL HISTORY:  Social History     Social History     Marital status:      Spouse name: Urbano Banks     Number of children: 2     Years of education: 13     Occupational History     Ministry coordinator      [employer]     Social History Main Topics     Smoking status: Former Smoker     Years: 10.00     Quit date: 1/1/1969     Smokeless tobacco: Never Used     Alcohol use 0.0 oz/week     0 Standard drinks or equivalent per week      Comment: occasional- 2 drinks per month     Drug use: No     Sexual activity: Yes     Birth control/ protection: Surgical     Other Topics Concern     Parent/Sibling W/ Cabg, Mi Or Angioplasty Before 65f 55m? No     Social History Narrative       CURRENT MEDICATIONS:  Current Outpatient Prescriptions   Medication Sig Dispense Refill     fluticasone (FLONASE) 50 MCG/ACT spray Spray 1 spray into both nostrils daily 1 Bottle 0     guaiFENesin-dextromethorphan (ROBITUSSIN DM) 100-10 MG/5ML syrup Take 5 mLs by mouth every 8 hours as needed for cough or congestion 560 mL 0     torsemide (DEMADEX) 5 MG tablet Take 4 tablets (20 mg) by mouth 2 times daily       calcitRIOL (ROCALTROL) 0.5 MCG capsule Take 1 capsule (0.5 mcg) by mouth daily       warfarin (COUMADIN) 2.5 MG tablet Take 1 tablet (2.5 mg) by mouth daily 30 tablet 0     Calcium Carb-Cholecalciferol 500-400 MG-UNIT TABS Take 1 tablet by mouth 2 times daily       CEPHALEXIN PO Take 500 mg by mouth 3 times daily X 10 days for UTI, started 8/25       ipratropium - albuterol 0.5 mg/2.5 mg/3 mL (DUONEB) 0.5-2.5 (3) MG/3ML neb solution Take 1 vial by nebulization 3 times daily       ACETAMINOPHEN PO Take 650 mg by mouth every 4 hours as needed for pain For pain 1-5 out of 10       TRAMADOL HCL PO Take 50 mg by mouth  every 6 hours as needed for moderate to severe pain Pain of 6-10 out of 10       cyanocobalamin (VITAMIN B12) 1000 MCG/ML injection Inject 1 mL into the muscle every 30 days       aspirin 81 MG tablet Take 1 tablet (81 mg) by mouth daily 30 tablet 3     sodium bicarbonate 650 MG tablet Take 2 tablets (1,300 mg) by mouth 3 times daily       nitroGLYcerin (NITROSTAT) 0.4 MG sublingual tablet Place 1 tablet (0.4 mg) under the tongue every 5 minutes as needed for chest pain 25 tablet 0     gabapentin (NEURONTIN) 300 MG capsule Take 1 capsule (300 mg) by mouth At Bedtime 90 capsule      pravastatin (PRAVACHOL) 40 MG tablet Take 1 tablet (40 mg) by mouth daily 90 tablet 3     metoprolol (LOPRESSOR) 50 MG tablet Take 0.5 tablets (25 mg) by mouth 2 times daily 180 tablet 3     amLODIPine (NORVASC) 5 MG tablet Take 2 tablets (10 mg) by mouth daily 30 tablet 3     ferrous sulfate (IRON) 325 (65 FE) MG tablet Take 1 tablet (325 mg) by mouth daily (with breakfast) 100 tablet      calcium acetate (PHOSLO) 667 MG CAPS capsule Take 1 capsule (667 mg) by mouth 3 times daily (with meals) 180 capsule      pentoxifylline (TRENTAL) 400 MG CR tablet Take 1 tablet (400 mg) by mouth daily       Lactobacillus (ACIDOPHILUS) tablet Take 1 tablet by mouth daily       ORDER FOR DME Equipment being ordered: cpap machine, mask, humidifier, tubing and filters 1 Device 0       ROS:   Constitutional: Denies fever, chills, or sweats. 6 lb weight gain/.   ENT: Double vision unchanged.  Needs to see opthalm. Hearing impairment in left ear.  Denies epistaxis, sore throat.   Allergies/Immunologic: Negative.   Respiratory:  Wet cough, coughing up green sputum.  Denies hemoptysis.  See HPI  Cardiovascular: As per HPI.   GI: No nausea, vomiting, diarrhea, hematemesis, melena, or hematochezia.   : No urinary frequency, dysuria, or hematuria.   Integument: Healing ulcers on lower extremities. Lower extremity edema.   Psychiatric: Negative.   Neuro: Memory  "impairment.   Endocrinology: Blood sugars have been elevated.   Musculoskeletal: Overall weakness.    EXAM:  /70 (BP Location: Left arm, Patient Position: Chair, Cuff Size: Adult Regular)  Pulse 71  Ht 1.626 m (5' 4\")  Wt 75.8 kg (167 lb)  SpO2 93%  BMI 28.67 kg/m2  General: appears comfortable, alert and articulate  Head: normocephalic, atraumatic  Eyes: anicteric sclera, EOMI  Neck: no adenopathy, neck supple.  No carotid bruits.  Orophyarynx: moist mucosa, no lesions, dentition intact  Heart: regular, S1/S2, no murmur, possible S3 gallop heard.  No rub, estimated JVP up to mandible when sitting upright in wheel chair.   Lungs: Crackles bilaterally lower lungs, with wet cough and slight wheeze.    Abdomen: soft, non-tender, distended, bowel sounds present, no hepatosplenomegaly although exam was difficult as she wasn't able to lie on the exam table.  Extremities: Stasis dermatitis.  A few healed  ulcers noted on the anterior portion of both shins.  2+ pitting edema bilaterally.  Unable to feel pedal pulses.   Neurological: normal speech and affect, no gross motor deficits    Labs:  CBC RESULTS:  Lab Results   Component Value Date    WBC 5.1 09/01/2017    RBC 3.17 (L) 09/01/2017    HGB 8.6 (L) 09/06/2017    HCT 28.8 (L) 09/06/2017    MCV 88 09/01/2017    MCH 25.9 (L) 09/01/2017    MCHC 29.4 (L) 09/01/2017    RDW 19.6 (H) 09/01/2017     09/01/2017       CMP RESULTS:  Lab Results   Component Value Date     (H) 09/06/2017    POTASSIUM 5.8 (H) 09/06/2017    CHLORIDE 114 (H) 09/06/2017    CO2 26 09/06/2017    ANIONGAP 7 09/06/2017     (H) 09/06/2017    BUN 36 (H) 09/06/2017    CR 4.67 (H) 09/06/2017    GFRESTIMATED 9 (L) 09/06/2017    GFRESTBLACK 11 (L) 09/06/2017    MALICK 6.6 (L) 09/06/2017    BILITOTAL 0.2 08/27/2017    ALBUMIN 2.0 (L) 09/06/2017    ALKPHOS 292 (H) 08/26/2017    ALT 80 (H) 08/26/2017    AST 34 08/26/2017        INR RESULTS:  Lab Results   Component Value Date    INR 2.11 " (H) 09/06/2017       No components found for: CK  Lab Results   Component Value Date    MAG 1.7 08/29/2017     Lab Results   Component Value Date    NTBNP 12979 (H) 09/06/2017       Most recent echocardiogram 8/12/17:  Interpretation Summary  Global and regional left ventricular function is normal with an EF of 60-65%. Global right ventricular function is mildly to moderately reduced. Estimated pulmonary artery systolic pressure is 44 mmHg plus right atrial pressure. Estiamted mean RA pressure is 15 mmHg. No pericardial effusion is present.    Assessment and Plan:    Kathryn is a 77 year old female with a recent admission for acute decompensated HFpEF.  She appears to be volume up.  The plan is to give her IV lasix 40 mg today in clinic since she has responded well in the past.  I have sent a message to her nephrologist for diuresis guidance until dialysis is initiated.  I spoke with her NP at the nursing home.  She will  get one dose of metolazone 2.5 mg as well as kayexalate for her potassium of 5.8.  Will follow closely via phone and plan to recheck her BMP later this week.    1.  Chronic diastolic heart failure.  Stage C  NYHA Class IIIB  ACEi/ARB: contraindicated due to renal dysfunction  BB: yes  Aldosterone antagonist: contraindicated due to renal dysfunction  SCD prophylaxis: does not meet criteria for implant  Fluid status: hypervolemic. Anticoagulation: Warfarin INR goal 2-3. INR today 2.11  Antiplatelet:  ASA dose 81 mg daily.     2.  Atrial fibrillation.  HR appears regular today.  Last EKG on 8/26 was SR.  She declined another EKG today. Will obtain one week zio monitor to assess for afib burden as she can't distinguish whether or not she is in atrial fibrillation.      3.  CKD Stage 4:  Followed by Dr. Barfield.  Has follow up scheduled the end of the month.    4.  CAD: Stable.  On ASA and statin.    5.  Follow-up: CORE clinic on 9/22 with Helen Plasencia NP at 8 am.      Destiny DUONG,  CNP  [phone]

## 2017-09-06 NOTE — NURSING NOTE
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 .    MELIZA Graham  8:03 AM

## 2017-09-06 NOTE — PATIENT INSTRUCTIONS
You were seen today in the Cardiovascular Clinic at the [facility].     Cardiology Providers you saw during your visit: Destiny Berrios NP     1. You were given Lasix 40 mg IV in clinic today.    2. Please continue with torsemide 20 mg twice daily.    3.  Please have labs drawn at [facility] ([facility]) this Friday.  We will send an order over to them.    4. You were given a list of high and low potassium foods.  Please concentrate on eliminating large sources of high potassium foods until we can review your labs this Friday 9/8.    5. You were given a Heart Failure booklet.  Please notify us with any questions or concerns.    6. You were fitted with a 7-day zio-patch monitor in clinic.    7. Please make a follow-up CORE/heart failure appt in 1 week with labs prior (9/22 at 8:00 a.m. with labs at 7:30 with Helen Plasencia NP.     Results for MARI HERNANDEZ (MRN [id number]) as of 9/6/2017 08:17   Ref. Range 9/6/2017 07:41   Sodium Latest Ref Range: 133 - 144 mmol/L 147 (H)   Potassium Latest Ref Range: 3.4 - 5.3 mmol/L 5.8 (H)   Chloride Latest Ref Range: 94 - 109 mmol/L 114 (H)   Carbon Dioxide Latest Ref Range: 20 - 32 mmol/L 26   Urea Nitrogen Latest Ref Range: 7 - 30 mg/dL 36 (H)   Creatinine Latest Ref Range: 0.52 - 1.04 mg/dL 4.67 (H)   GFR Estimate Latest Ref Range: >60 mL/min/1.7m2 9 (L)   GFR Estimate If Black Latest Ref Range: >60 mL/min/1.7m2 11 (L)   Calcium Latest Ref Range: 8.5 - 10.1 mg/dL 6.6 (L)   Anion Gap Latest Ref Range: 3 - 14 mmol/L 7   Phosphorus Latest Ref Range: 2.5 - 4.5 mg/dL 4.9 (H)   Albumin Latest Ref Range: 3.4 - 5.0 g/dL 2.0 (L)   N-Terminal Pro Bnp Latest Ref Range: 0 - 450 pg/mL 92183 (H)   Glucose Latest Ref Range: 70 - 99 mg/dL 102 (H)   Hemoglobin Latest Ref Range: 11.7 - 15.7 g/dL 8.6 (L)   Hematocrit Latest Ref Range: 35.0 - 47.0 % 28.8 (L)   INR Latest Ref Range: 0.86 - 1.14  2.11 (H)       Please limit your fluid intake to 2 L (64 ounces) daily.  2 Liters a day =  "8.5 cups, or 72 ounces.  Please limit your salt intake to 2 grams a day or less.    If you gain 2# in 24 hours or 5# in one week call Ruth Badillo RN so we can adjust your medications as needed over the phone.    Please feel free to call me with any questions or concerns.      Ruth Badillo RN BSN [facility]  Cardiology Care Coordinator-Heart Failure Clinic    Questions and [phone].   First press #1 for the University and then press #3 for \"Medical Questions\" to reach the Cardiology Nurses.     On Call Cardiologist for after hours or on weekends: [phone]   option #4 and ask to speak to the on-call Cardiologist. Inform them you are a CORE/heart failure patient at the [facility].      If you need a medication refill please contact your pharmacy.  Please allow 3 business days for your refill to be completed.  _______________________________________________________  C.O.R.E. CLINIC Cardiomyopathy, Optimization, Rehabilitation, Education   The C.O.R.E. CLINIC is a heart failure specialty clinic within the [facility] where you will work with specialized nurse practitioners dedicated to helping patients with heart failure carefully adjust medications, receive education, and learn who and when to call if symptoms develop. They specialize in helping you better understand your condition, slow the progression of your disease, improve the length and quality of your life, help you detect future heart problems before they become life threatening, and avoid hospitalizations.  As always, thank you for trusting us with your health care needs!          "

## 2017-09-06 NOTE — MR AVS SNAPSHOT
After Visit Summary   9/6/2017    Kathryn Banks    MRN: [id number]           Patient Information     Date Of Birth          1940        Visit Information        Provider Department      9/6/2017 8:00 AM Destiny Berrios NP  [facility]        Today's Diagnoses     Chronic diastolic heart failure (H)    -  1    Atrial fibrillation (H)          Care Instructions    You were seen today in the Cardiovascular Clinic at the [facility].     Cardiology Providers you saw during your visit: Destiny Berrios NP     1. You were given Lasix 40 mg IV in clinic today.    2. Please continue with torsemide 20 mg twice daily.    3.  Please have labs drawn at [facility] ([facility]) this Friday.  We will send an order over to them.    4. You were given a list of high and low potassium foods.  Please concentrate on eliminating large sources of high potassium foods until we can review your labs this Friday 9/8.    5. You were given a Heart Failure booklet.  Please notify us with any questions or concerns.    6. You were fitted with a 7-day zio-patch monitor in clinic.    7. Please make a follow-up CORE/heart failure appt in 1 week with labs prior (9/22 at 8:00 a.m. with labs at 7:30 with Helen Plasencia NP.     Results for KATHRYN BANKS (MRN [id number]) as of 9/6/2017 08:17   Ref. Range 9/6/2017 07:41   Sodium Latest Ref Range: 133 - 144 mmol/L 147 (H)   Potassium Latest Ref Range: 3.4 - 5.3 mmol/L 5.8 (H)   Chloride Latest Ref Range: 94 - 109 mmol/L 114 (H)   Carbon Dioxide Latest Ref Range: 20 - 32 mmol/L 26   Urea Nitrogen Latest Ref Range: 7 - 30 mg/dL 36 (H)   Creatinine Latest Ref Range: 0.52 - 1.04 mg/dL 4.67 (H)   GFR Estimate Latest Ref Range: >60 mL/min/1.7m2 9 (L)   GFR Estimate If Black Latest Ref Range: >60 mL/min/1.7m2 11 (L)   Calcium Latest Ref Range: 8.5 - 10.1 mg/dL 6.6 (L)   Anion Gap Latest Ref Range: 3 - 14 mmol/L 7   Phosphorus Latest Ref Range: 2.5 - 4.5 mg/dL 4.9 (H)  "  Albumin Latest Ref Range: 3.4 - 5.0 g/dL 2.0 (L)   N-Terminal Pro Bnp Latest Ref Range: 0 - 450 pg/mL 54878 (H)   Glucose Latest Ref Range: 70 - 99 mg/dL 102 (H)   Hemoglobin Latest Ref Range: 11.7 - 15.7 g/dL 8.6 (L)   Hematocrit Latest Ref Range: 35.0 - 47.0 % 28.8 (L)   INR Latest Ref Range: 0.86 - 1.14  2.11 (H)       Please limit your fluid intake to 2 L (64 ounces) daily.  2 Liters a day = 8.5 cups, or 72 ounces.  Please limit your salt intake to 2 grams a day or less.    If you gain 2# in 24 hours or 5# in one week call Ruth Badillo RN so we can adjust your medications as needed over the phone.    Please feel free to call me with any questions or concerns.      Ruth Badillo RN BSN [facility]  Cardiology Care Coordinator-Heart Failure Clinic    Questions and [phone].   First press #1 for the [employer] and then press #3 for \"Medical Questions\" to reach the Cardiology Nurses.     On Call Cardiologist for after hours or on weekends: [phone]   option #4 and ask to speak to the on-call Cardiologist. Inform them you are a CORE/heart failure patient at the [facility].      If you need a medication refill please contact your pharmacy.  Please allow 3 business days for your refill to be completed.  _______________________________________________________  C.O.R.E. CLINIC Cardiomyopathy, Optimization, Rehabilitation, Education   The C.O.R.E. CLINIC is a heart failure specialty clinic within the [facility] where you will work with specialized nurse practitioners dedicated to helping patients with heart failure carefully adjust medications, receive education, and learn who and when to call if symptoms develop. They specialize in helping you better understand your condition, slow the progression of your disease, improve the length and quality of your life, help you detect future heart problems before they become life threatening, and " avoid hospitalizations.  As always, thank you for trusting us with your health care needs!                  Follow-ups after your visit        Your next 10 appointments already scheduled     Sep 06, 2017 11:30 AM CDT   Nursing Home with RHODA Soler CNP   [facility] ([facility])    [address] [facility] [id number] [phone]            Sep 15, 2017  1:30 PM CDT   Office Visit with Dheeraj Jj MD   [facility]    [address] [id number] [phone]           Bring a current list of meds and any records pertaining to this visit. For Physicals, please bring immunization records and any forms needing to be filled out. Please arrive 10 minutes early to complete paperwork.            Sep 19, 2017 11:30 AM CDT   LAB with LAB FIRST FLOOR [facility] ([facility])    [address] [place] [address] [phone]           Patient must bring picture ID. Patient should be prepared to give a urine specimen  Please do not eat 10-12 hours before your appointment if you are coming in fasting for labs on lipids, cholesterol, or glucose (sugar). Pregnant women should follow their Care Team instructions. Water with medications is okay. Do not drink coffee or other fluids. If you have concerns about taking  your medications, please ask at office or if scheduling via [employer], send a message by clicking on Secure Messaging, Message Your Care Team.            Sep 19, 2017 12:00 PM CDT   Return Visit with Vibha Barfield MD   [facility] ([facility])    [address] [place] [id number] [phone]            Sep 22, 2017  7:30 AM CDT   Lab with  LAB   [facility] ([facility])    [address]  1st Floor  [facility] [id number] [phone]            Sep 22,  "  8:00 AM CDT   (Arrive by 7:45 AM)   CORE RETURN with RHODA Liu CNP   [facility] ([facility])    [address] [address] [phone]              Who to contact     If you have questions or need follow up information about today's clinic visit or your schedule please contact [facility] directly at [phone].  Normal or non-critical lab and imaging results will be communicated to you by [employer], letter or phone within 4 business days after the clinic has received the results. If you do not hear from us within 7 days, please contact the clinic through [employer] or phone. If you have a critical or abnormal lab result, we will notify you by phone as soon as possible.  Submit refill requests through [employer] or call your pharmacy and they will forward the refill request to us. Please allow 3 business days for your refill to be completed.          Additional Information About Your Visit        [employer] Information     [employer] lets you send messages to your doctor, view your test results, renew your prescriptions, schedule appointments and more. To sign up, go to [email or web address] . Click on \"Log in\" on the left side of the screen, which will take you to the Welcome page. Then click on \"Sign up Now\" on the right side of the page.     You will be asked to enter the access code listed below, as well as some personal information. Please follow the directions to create your username and password.     Your access code is: [email or web address]  Expires: 2017  4:04 PM     Your access code will  in 90 days. If you need help or a new code, please call your [place] clinic or [phone].        Care EveryWhere ID     This is your Care EveryWhere ID. This could be used by other organizations to access your [facility] medical records  [id number]        Your Vitals Were     Pulse Height Pulse Oximetry BMI (Body Mass Index)    " "      71 1.626 m (5' 4\") 93% 28.67 kg/m2         Blood Pressure from Last 3 Encounters:   09/06/17 137/70   09/01/17 106/50   08/23/17 153/84    Weight from Last 3 Encounters:   09/06/17 75.8 kg (167 lb)   08/31/17 73.2 kg (161 lb 6.4 oz)   08/23/17 83.3 kg (183 lb 9.6 oz)               Primary Care Provider Office Phone # Fax #    Dheeraj Jj [phone]       [facility] [address] [place] [address]        Equal Access to Services     [facility]: Hadii aad ku hadasho Soomaali, waaxda luqadaha, qaybta kaalmada adeegyada, tay santizo . So [facility] [phone].    ATENCIÓN: Si habla español, tiene a medrano disposición servicios gratuitos de asistencia lingüística. Llame al [phone].    We comply with applicable federal civil rights laws and [place] laws. We do not discriminate on the basis of race, color, national origin, age, disability sex, sexual orientation or gender identity.            Thank you!     Thank you for choosing [facility]  for your care. Our goal is always to provide you with excellent care. Hearing back from our patients is one way we can continue to improve our services. Please take a few minutes to complete the written survey that you may receive in the mail after your visit with us. Thank you!             Your Updated Medication List - Protect others around you: Learn how to safely use, store and throw away your medicines at www.disposemymeds.org.          This list is accurate as of: 9/6/17 10:26 AM.  Always use your most recent med list.                   Brand Name Dispense Instructions for use Diagnosis    ACETAMINOPHEN PO      Take 650 mg by mouth every 4 hours as needed for pain For pain 1-5 out of 10        acidophilus tablet      Take 1 tablet by mouth daily    Urinary tract infection without hematuria, site unspecified, Cellulitis of lower extremity, unspecified laterality       amLODIPine 5 MG tablet    " NORVASC    30 tablet    Take 2 tablets (10 mg) by mouth daily    Benign essential hypertension       aspirin 81 MG tablet     30 tablet    Take 1 tablet (81 mg) by mouth daily    Coronary artery disease involving native coronary artery of native heart without angina pectoris       calcitRIOL 0.5 MCG capsule    ROCALTROL     Take 1 capsule (0.5 mcg) by mouth daily    Constitutional chronic hypocalcemia       calcium acetate 667 MG Caps capsule    PHOSLO    180 capsule    Take 1 capsule (667 mg) by mouth 3 times daily (with meals)    Chronic kidney disease, unspecified CKD stage       Calcium Carb-Cholecalciferol 500-400 MG-UNIT Tabs      Take 1 tablet by mouth 2 times daily        CEPHALEXIN PO      Take 500 mg by mouth 3 times daily X 10 days for UTI, started 8/25        cyanocobalamin 1000 MCG/ML injection    VITAMIN B12     Inject 1 mL into the muscle every 30 days        ferrous sulfate 325 (65 FE) MG tablet    IRON    100 tablet    Take 1 tablet (325 mg) by mouth daily (with breakfast)    Iron deficiency anemia secondary to inadequate dietary iron intake       fluticasone 50 MCG/ACT spray    FLONASE    1 Bottle    Spray 1 spray into both nostrils daily    Nasal congestion       gabapentin 300 MG capsule    NEURONTIN    90 capsule    Take 1 capsule (300 mg) by mouth At Bedtime    Diabetic polyneuropathy associated with type 2 diabetes mellitus (H)       guaiFENesin-dextromethorphan 100-10 MG/5ML syrup    ROBITUSSIN DM    560 mL    Take 5 mLs by mouth every 8 hours as needed for cough or congestion    Cough       ipratropium - albuterol 0.5 mg/2.5 mg/3 mL 0.5-2.5 (3) MG/3ML neb solution    DUONEB     Take 1 vial by nebulization 3 times daily        metoprolol 50 MG tablet    LOPRESSOR    180 tablet    Take 0.5 tablets (25 mg) by mouth 2 times daily    Atrial fibrillation with rapid ventricular response (H)       nitroGLYcerin 0.4 MG sublingual tablet    NITROSTAT    25 tablet    Place 1 tablet (0.4 mg) under the  tongue every 5 minutes as needed for chest pain    Hx of non-ST elevation myocardial infarction (NSTEMI), Atherosclerosis of native coronary artery of native heart without angina pectoris, Postsurgical aortocoronary bypass status       order for DME     1 Device    Equipment being ordered: cpap machine, mask, humidifier, tubing and filters    ANABEL (obstructive sleep apnea)       pentoxifylline 400 MG CR tablet    TRENtal     Take 1 tablet (400 mg) by mouth daily    Venous stasis ulcer (H)       pravastatin 40 MG tablet    PRAVACHOL    90 tablet    Take 1 tablet (40 mg) by mouth daily    Hx of non-ST elevation myocardial infarction (NSTEMI), Atherosclerosis of native coronary artery of native heart without angina pectoris, Type 2 diabetes mellitus with other circulatory complications (H)       sodium bicarbonate 650 MG tablet      Take 2 tablets (1,300 mg) by mouth 3 times daily    Chronic kidney disease, unspecified CKD stage       torsemide 5 MG tablet    DEMADEX     Take 4 tablets (20 mg) by mouth 2 times daily    Chronic diastolic congestive heart failure (H)       TRAMADOL HCL PO      Take 50 mg by mouth every 6 hours as needed for moderate to severe pain Pain of 6-10 out of 10        warfarin 2.5 MG tablet    COUMADIN    30 tablet    Take 1 tablet (2.5 mg) by mouth daily    Atrial fibrillation with rapid ventricular response (H)

## 2017-09-06 NOTE — NURSING NOTE
Diet: Patient instructed regarding a heart failure healthy diet, including discussion of reduced fat and 2000 mg daily sodium restriction, daily weights, medication purpose and compliance, fluid restrictions and resources for patient and family to access for assistance with heart failure management.     1. Patient was given lists of high and low potassium foods with instructions to concentrate on lowering high potassium sources until next lab draw can determine potassium level which is high (5.8) today.    Labs: Patient was given results of the laboratory testing obtained today and patient was instructed about when to return for the next laboratory testing.  1. Patient was given IV lasix 40 mg in clinic today and will have repeat labs at [facility].  The order has been faxed to [phone] (Attn: Yovani at [facility] [phone])    Med Reconcile: Reviewed and verified all current medications with the patient. The updated medication list was printed and given to the patient.  1. There were no additional medication changes and patient instructed to continue current torsemide dosage.    Return Appointment: Patient given instructions regarding scheduling next clinic visit.   1. Patient will [place] [facility] [place] (9/22 at 8:00 with labs prior at 7:30).    Patient stated that she understood all health information given and agreed to call with further questions or concerns.    Patient attended clinic with daughter, Jennifer. She states that she will be at [facility] for 2 weeks and return to home in [place].  She and daughter were given HF booklets which were reviewed. She also received a 1 week ZioMonitor.

## 2017-09-06 NOTE — TELEPHONE ENCOUNTER
Anemia Management Note  SUBJECTIVE/OBJECTIVE:  Referred by Dr. Vibha Barfield on 7/18/2017  Primary Diagnosis: Anemia in Chronic Kidney Disease (N18.4, D63.1)     Secondary Diagnosis:  Chronic Kidney Disease, Stage 4 (N18.4)   Hgb goal range:  9-10  Epo/Darbo: None  Iron regimen:  Ferrous Sulfate once daily  Labs exp: 8/8/18    Anemia Latest Ref Rng & Units 8/27/2017 8/28/2017 8/29/2017 8/30/2017 8/31/2017 9/1/2017 9/6/2017   Hemoglobin 11.7 - 15.7 g/dL 7.8(L) 8.1(L) 8.0(L) 8.9(L) 7.7(L) 8.2(L) 8.6(L)   IV Iron Dose - - - - - - - -   TSAT 15 - 46 % - - - - - - -   Ferritin 8 - 252 ng/mL - - - - - - -     BP Readings from Last 3 Encounters:   09/06/17 121/70   09/06/17 137/70   09/01/17 106/50     Wt Readings from Last 2 Encounters:   09/06/17 165 lb 6.4 oz (75 kg)   09/06/17 167 lb (75.8 kg)     Patient was in the hospital:   Date of Admission:  8/26/2017  Date of Discharge:  9/1/2017 to  [facility]               Contact name and phone number/fax:  [phone]    She will be at [facility] for [facility] weeks and return to home in [place].  Appt w/Dr Barfield and labs is scheduled for 9/19/17    She needs to schedule Injectafer 750mg weekly x 2 doses.    ASSESSMENT:  Hgb: Not at goal but improving - need to replace iron prior to initiation of SUELLEN. -belen  TSat: not at goal of >30% Ferritin: At goal (>100ng/mL). Re-evaluate need for IV iron given she was given dose venofer in the hospital. -belen    PLAN:  RTC for Hgb, ferritin and iron labs in 2 week(s)  Resume anemia mgmt when discharged from TCU  Will have her re-check iron studies at 9/19 then determine if IV iron still indicated. -belen    Orders needed to be renewed (for next follow-up date) in EPIC: None    Iron labs due:  9/19/17    Plan discussed with:  No call made  Plan provided by:  Caprice    NEXT FOLLOW-UP DATE:  9/19/17    Anemia Management Service  Yesy Samaniego PharmD and Gretchen Brady CPhT  Phone: [phone]  Fax: [phone]

## 2017-09-06 NOTE — LETTER
9/6/2017      RE: Kathryn Banks  [address] [facility] [id number]       Dear Colleague,    Thank you for the opportunity to participate in the care of your patient, Kathryn Banks, at the [facility] HEART [facility]. Please see a copy of my visit note below.      HPI:  Kathryn is a 77 year old female with HFpEF, CAD with CABG in 2006, CKD stage 4, memory loss, obesity, ANABEL, and  DM II, who was recently hospitalized at [facility] from 8/26-9/1 for acute decompensated diastolic HF, acute on chronic kidney injury, and rapid atrial fibrillation.     Since discharge, she states she continues to feel congested.  She is wearing CPAP nightly. Is currently at [facility] for approximately two more weeks for more therapy and help with balance issues.  She notes SOB after walking about 70 feet with a walker and  needs to stop a couple of times to rest.  Positive 2+ pillow orthopnea and lower extemity edema.  Weight is up 6 lbs.  Denies SOB at rest, PND, chest pain, palpitations, lightheadedness, dizziness, near syncopal/syncopal episodes.  Notes a wet cough and continues to cough up light green color sputum.  Denies fever or recent illness.       PAST MEDICAL HISTORY:  Past Medical History:   Diagnosis Date     Carpal tunnel syndrome      Coronary atherosclerosis of native coronary artery 2006    5 vessel CABG     OBSTRUCTIVE SLEEP APNEA     using CPAP     Osteoarthrosis, unspecified whether generalized or localized, unspecified site     generalized arthritis, particularly in her hands and feet         Other and combined forms of senile cataract 2000    Bilateral     Other and unspecified hyperlipidemia      RESTLESS LEGS SYNDROME      TENOSYNOV HAND/WRIST NEC 6/30/2006     Type II or unspecified type diabetes mellitus without mention of complication, not stated as uncontrolled 2001    Diabetes mellitus/pt is diet controlled with weight loss     Typical atrial flutter (H)  2007    ablation 2017     Unspecified essential hypertension        FAMILY HISTORY:  Family History   Problem Relation Age of Onset     DIABETES Father      AODM     Neurologic Disorder Father      Parkinson's     Blood Disease Father       from blood clot from leg to lung immediately after hip fracture     DIABETES Paternal Grandmother      adult onset     DIABETES Maternal Grandmother      adult onset     Hypertension No family hx of      CEREBROVASCULAR DISEASE No family hx of        SOCIAL HISTORY:  Social History     Social History     Marital status:      Spouse name: Urbano Banks     Number of children: 2     Years of education: 13     Occupational History     Ministry coordinator      [employer]     Social History Main Topics     Smoking status: Former Smoker     Years: 10     Quit date: 1969     Smokeless tobacco: Never Used     Alcohol use 0.0 oz/week     0 Standard drinks or equivalent per week      Comment: occasional- 2 drinks per month     Drug use: No     Sexual activity: Yes     Birth control/ protection: Surgical     Other Topics Concern     Parent/Sibling W/ Cabg, Mi Or Angioplasty Before 65f 55m? No     Social History Narrative       CURRENT MEDICATIONS:  Current Outpatient Prescriptions   Medication Sig Dispense Refill     fluticasone (FLONASE) 50 MCG/ACT spray Spray 1 spray into both nostrils daily 1 Bottle 0     guaiFENesin-dextromethorphan (ROBITUSSIN DM) 100-10 MG/5ML syrup Take 5 mLs by mouth every 8 hours as needed for cough or congestion 560 mL 0     torsemide (DEMADEX) 5 MG tablet Take 4 tablets (20 mg) by mouth 2 times daily       calcitRIOL (ROCALTROL) 0.5 MCG capsule Take 1 capsule (0.5 mcg) by mouth daily       warfarin (COUMADIN) 2.5 MG tablet Take 1 tablet (2.5 mg) by mouth daily 30 tablet 0     Calcium Carb-Cholecalciferol 500-400 MG-UNIT TABS Take 1 tablet by mouth 2 times daily       CEPHALEXIN PO Take 500 mg by mouth 3  times daily X 10 days for UTI, started 8/25       ipratropium - albuterol 0.5 mg/2.5 mg/3 mL (DUONEB) 0.5-2.5 (3) MG/3ML neb solution Take 1 vial by nebulization 3 times daily       ACETAMINOPHEN PO Take 650 mg by mouth every 4 hours as needed for pain For pain 1-5 out of 10       TRAMADOL HCL PO Take 50 mg by mouth every 6 hours as needed for moderate to severe pain Pain of 6-10 out of 10       cyanocobalamin (VITAMIN B12) 1000 MCG/ML injection Inject 1 mL into the muscle every 30 days       aspirin 81 MG tablet Take 1 tablet (81 mg) by mouth daily 30 tablet 3     sodium bicarbonate 650 MG tablet Take 2 tablets (1,300 mg) by mouth 3 times daily       nitroGLYcerin (NITROSTAT) 0.4 MG sublingual tablet Place 1 tablet (0.4 mg) under the tongue every 5 minutes as needed for chest pain 25 tablet 0     gabapentin (NEURONTIN) 300 MG capsule Take 1 capsule (300 mg) by mouth At Bedtime 90 capsule      pravastatin (PRAVACHOL) 40 MG tablet Take 1 tablet (40 mg) by mouth daily 90 tablet 3     metoprolol (LOPRESSOR) 50 MG tablet Take 0.5 tablets (25 mg) by mouth 2 times daily 180 tablet 3     amLODIPine (NORVASC) 5 MG tablet Take 2 tablets (10 mg) by mouth daily 30 tablet 3     ferrous sulfate (IRON) 325 (65 FE) MG tablet Take 1 tablet (325 mg) by mouth daily (with breakfast) 100 tablet      calcium acetate (PHOSLO) 667 MG CAPS capsule Take 1 capsule (667 mg) by mouth 3 times daily (with meals) 180 capsule      pentoxifylline (TRENTAL) 400 MG CR tablet Take 1 tablet (400 mg) by mouth daily       Lactobacillus (ACIDOPHILUS) tablet Take 1 tablet by mouth daily       ORDER FOR DME Equipment being ordered: cpap machine, mask, humidifier, tubing and filters 1 Device 0       ROS:   Constitutional: Denies fever, chills, or sweats. 6 lb weight gain/.   ENT: Double vision unchanged.  Needs to see opthalm. Hearing impairment in left ear.  Denies epistaxis, sore throat.   Allergies/Immunologic: Negative.   Respiratory:  Wet cough,  "coughing up green sputum.  Denies hemoptysis.  See HPI  Cardiovascular: As per HPI.   GI: No nausea, vomiting, diarrhea, hematemesis, melena, or hematochezia.   : No urinary frequency, dysuria, or hematuria.   Integument: Healing ulcers on lower extremities. Lower extremity edema.   Psychiatric: Negative.   Neuro: Memory impairment.   Endocrinology: Blood sugars have been elevated.   Musculoskeletal: Overall weakness.    EXAM:  /70 (BP Location: Left arm, Patient Position: Chair, Cuff Size: Adult Regular)  Pulse 71  Ht 1.626 m (5' 4\")  Wt 75.8 kg (167 lb)  SpO2 93%  BMI 28.67 kg/m2  General: appears comfortable, alert and articulate  Head: normocephalic, atraumatic  Eyes: anicteric sclera, EOMI  Neck: no adenopathy, neck supple.  No carotid bruits.  Orophyarynx: moist mucosa, no lesions, dentition intact  Heart: regular, S1/S2, no murmur, possible S3 gallop heard.  No rub, estimated JVP up to mandible when sitting upright in wheel chair.   Lungs: Crackles bilaterally lower lungs, with wet cough and slight wheeze.    Abdomen: soft, non-tender, distended, bowel sounds present, no hepatosplenomegaly although exam was difficult as she wasn't able to lie on the exam table.  Extremities: Stasis dermatitis.  A few healed  ulcers noted on the anterior portion of both shins.  2+ pitting edema bilaterally.  Unable to feel pedal pulses.   Neurological: normal speech and affect, no gross motor deficits    Labs:  CBC RESULTS:  Lab Results   Component Value Date    WBC 5.1 09/01/2017    RBC 3.17 (L) 09/01/2017    HGB 8.6 (L) 09/06/2017    HCT 28.8 (L) 09/06/2017    MCV 88 09/01/2017    MCH 25.9 (L) 09/01/2017    MCHC 29.4 (L) 09/01/2017    RDW 19.6 (H) 09/01/2017     09/01/2017       CMP RESULTS:  Lab Results   Component Value Date     (H) 09/06/2017    POTASSIUM 5.8 (H) 09/06/2017    CHLORIDE 114 (H) 09/06/2017    CO2 26 09/06/2017    ANIONGAP 7 09/06/2017     (H) 09/06/2017    BUN 36 (H) " 09/06/2017    CR 4.67 (H) 09/06/2017    GFRESTIMATED 9 (L) 09/06/2017    GFRESTBLACK 11 (L) 09/06/2017    MALICK 6.6 (L) 09/06/2017    BILITOTAL 0.2 08/27/2017    ALBUMIN 2.0 (L) 09/06/2017    ALKPHOS 292 (H) 08/26/2017    ALT 80 (H) 08/26/2017    AST 34 08/26/2017        INR RESULTS:  Lab Results   Component Value Date    INR 2.11 (H) 09/06/2017       No components found for: CK  Lab Results   Component Value Date    MAG 1.7 08/29/2017     Lab Results   Component Value Date    NTBNP 64634 (H) 09/06/2017       Most recent echocardiogram 8/12/17:  Interpretation Summary  Global and regional left ventricular function is normal with an EF of 60-65%. Global right ventricular function is mildly to moderately reduced. Estimated pulmonary artery systolic pressure is 44 mmHg plus right atrial pressure. Estiamted mean RA pressure is 15 mmHg. No pericardial effusion is present.    Assessment and Plan:    Kathryn is a 77 year old female with a recent admission for acute decompensated HFpEF.  She appears to be volume up.  The plan is to give her IV lasix 40 mg today in clinic since she has responded well in the past.  I have sent a message to her nephrologist for diuresis guidance until dialysis is initiated.  I spoke with her NP at the nursing home.  She will  get one dose of metolazone 2.5 mg as well as kayexalate for her potassium of 5.8.  Will follow closely via phone and plan to recheck her BMP later this week.    1.  Chronic diastolic heart failure.  Stage C  NYHA Class IIIB  ACEi/ARB: contraindicated due to renal dysfunction  BB: yes  Aldosterone antagonist: contraindicated due to renal dysfunction  SCD prophylaxis: does not meet criteria for implant  Fluid status: hypervolemic. Anticoagulation: Warfarin INR goal 2-3. INR today 2.11  Antiplatelet:  ASA dose 81 mg daily.     2.  Atrial fibrillation.  HR appears regular today.  Last EKG on 8/26 was SR.  She declined another EKG today. Will obtain one week zio monitor to assess  for afib burden as she can't distinguish whether or not she is in atrial fibrillation.      3.  CKD Stage 4:  Followed by Dr. Barfield.  Has follow up scheduled the end of the month.    4.  CAD: Stable.  On ASA and statin.    5.  Follow-up: [facility] on 9/22 with Helen Plasencia NP at 8 am.      Destiny DUONG, CNP  [phone]

## 2017-09-06 NOTE — TELEPHONE ENCOUNTER
MTM referral from: Transitions of Care (recent hospital discharge or ED visit)    MTM referral outreach attempt #2 on September 6, 2017 at 11:08 AM      Outcome: Patient is not interested at this time because they were discharged to SNF, will route to MTM Pharmacist/Provider as an FYI. Thank you for the referral.     Radha Mcgregor MTM Coordinator

## 2017-09-06 NOTE — NURSING NOTE
Per NP Destiny Berrios, patient to have 7 day Zio Patch monitor placed.  Diagnosis: AFib  Monitor placed: Yes  Patient Instructed: Yes  Patient verbalized understanding: Yes

## 2017-09-08 ENCOUNTER — NURSING HOME VISIT (OUTPATIENT)
Dept: GERIATRICS | Facility: CLINIC | Age: 77
End: 2017-09-08
Payer: COMMERCIAL

## 2017-09-08 VITALS
BODY MASS INDEX: 28.55 KG/M2 | DIASTOLIC BLOOD PRESSURE: 79 MMHG | SYSTOLIC BLOOD PRESSURE: 136 MMHG | TEMPERATURE: 98.3 F | HEART RATE: 88 BPM | OXYGEN SATURATION: 92 % | WEIGHT: 166.3 LBS | RESPIRATION RATE: 20 BRPM

## 2017-09-08 DIAGNOSIS — L97.909 VENOUS ULCER (H): ICD-10-CM

## 2017-09-08 DIAGNOSIS — J90 PLEURAL EFFUSION: ICD-10-CM

## 2017-09-08 DIAGNOSIS — E87.5 HYPERKALEMIA: ICD-10-CM

## 2017-09-08 DIAGNOSIS — E11.59 TYPE 2 DIABETES MELLITUS WITH OTHER CIRCULATORY COMPLICATIONS (H): ICD-10-CM

## 2017-09-08 DIAGNOSIS — I48.91 ATRIAL FIBRILLATION WITH RAPID VENTRICULAR RESPONSE (H): ICD-10-CM

## 2017-09-08 DIAGNOSIS — R53.81 PHYSICAL DECONDITIONING: ICD-10-CM

## 2017-09-08 DIAGNOSIS — I83.009 VENOUS ULCER (H): ICD-10-CM

## 2017-09-08 DIAGNOSIS — E83.51 HYPOCALCEMIA: ICD-10-CM

## 2017-09-08 DIAGNOSIS — D63.1 ANEMIA OF CHRONIC RENAL FAILURE, STAGE 4 (SEVERE) (H): ICD-10-CM

## 2017-09-08 DIAGNOSIS — H91.92 DECREASED HEARING OF LEFT EAR: ICD-10-CM

## 2017-09-08 DIAGNOSIS — I10 ESSENTIAL HYPERTENSION WITH GOAL BLOOD PRESSURE LESS THAN 140/90: ICD-10-CM

## 2017-09-08 DIAGNOSIS — E87.20 METABOLIC ACIDOSIS: ICD-10-CM

## 2017-09-08 DIAGNOSIS — N18.4 ANEMIA OF CHRONIC RENAL FAILURE, STAGE 4 (SEVERE) (H): ICD-10-CM

## 2017-09-08 DIAGNOSIS — K52.9 CHRONIC DIARRHEA: ICD-10-CM

## 2017-09-08 DIAGNOSIS — N18.30 CHRONIC KIDNEY DISEASE, STAGE III (MODERATE) (H): ICD-10-CM

## 2017-09-08 DIAGNOSIS — I50.32 CHRONIC DIASTOLIC HEART FAILURE (H): Primary | ICD-10-CM

## 2017-09-08 DIAGNOSIS — R30.0 DYSURIA: ICD-10-CM

## 2017-09-08 LAB
ANION GAP SERPL CALCULATED.3IONS-SCNC: 11 MMOL/L
BUN SERPL-MCNC: 40 MG/DL
CALCIUM SERPL-MCNC: 6.4 MG/DL
CHLORIDE SERPLBLD-SCNC: 115 MMOL/L
CO2 SERPL-SCNC: 20 MMOL/L
CREAT SERPL-MCNC: 4.79 MG/DL
GFR SERPL CREATININE-BSD FRML MDRD: 9 ML/MIN/1.73M2
GLUCOSE SERPL-MCNC: 49 MG/DL (ref 70–99)
POTASSIUM SERPL-SCNC: 4.9 MMOL/L
SODIUM SERPL-SCNC: 146 MMOL/L

## 2017-09-08 PROCEDURE — 99309 SBSQ NF CARE MODERATE MDM 30: CPT | Performed by: NURSE PRACTITIONER

## 2017-09-08 RX ORDER — FERROUS SULFATE 325(65) MG
325 TABLET ORAL 2 TIMES DAILY
COMMUNITY
End: 2017-09-20

## 2017-09-08 NOTE — PROGRESS NOTES
[facility]    Chief Complaint   Patient presents with     Nursing Home Acute       HPI:    Kathryn Banks is a 77 year old  (1940), who is being seen today for an episodic care visit at [facility].  HPI information obtained from: facility chart records, facility staff, patient report and [facility] chart review.    Today's concern is:     Chronic diastolic heart failure (H)  Essential hypertension with goal blood pressure less than 140/90  Chronic kidney disease, stage III (moderate)  Atrial fibrillation with rapid ventricular response (H)  Hypocalcemia  Decreased hearing of left ear  Venous ulcer  Metabolic acidosis  Dysuria  Type 2 diabetes mellitus with other circulatory complications (H)  Pleural effusion  Anemia of chronic renal failure, stage 4 (severe) (H)  Chronic diarrhea  Physical deconditioning  Hyperkalemia     Patient is met today in her room at [facility] where she is smiling, denies pain, fever/chills, CP, SOB, heartburn, upset stomach.  She notes some persistent diarrhea but notes it is improving (now with antibiotics off).     She has recently been given extra diuresis and has labs returning today.        ALLERGIES: Ciprofloxacin; Oxycodone; Lisinopril; and Sulfa drugs  Past Medical, Surgical, Family and Social History reviewed and updated in [facility].    Current Outpatient Prescriptions   Medication Sig Dispense Refill     ferrous sulfate (IRON) 325 (65 FE) MG tablet Take 325 mg by mouth 2 times daily       Warfarin Sodium (COUMADIN PO) Take by mouth daily Take as directed per INR results       fluticasone (FLONASE) 50 MCG/ACT spray Spray 1 spray into both nostrils daily 1 Bottle 0     guaiFENesin-dextromethorphan (ROBITUSSIN DM) 100-10 MG/5ML syrup Take 5 mLs by mouth every 8 hours as needed for cough or congestion 560 mL 0     torsemide (DEMADEX) 5 MG tablet Take 4 tablets (20 mg) by mouth 2 times daily       calcitRIOL (ROCALTROL) 0.5 MCG capsule Take 1 capsule  (0.5 mcg) by mouth daily       Calcium Carb-Cholecalciferol 500-400 MG-UNIT TABS Take 1 tablet by mouth 2 times daily       ipratropium - albuterol 0.5 mg/2.5 mg/3 mL (DUONEB) 0.5-2.5 (3) MG/3ML neb solution Take 1 vial by nebulization 3 times daily       ACETAMINOPHEN PO Take 650 mg by mouth every 4 hours as needed for pain For pain 1-5 out of 10       TRAMADOL HCL PO Take 50 mg by mouth every 6 hours as needed for moderate to severe pain Pain of 6-10 out of 10       cyanocobalamin (VITAMIN B12) 1000 MCG/ML injection Inject 1 mL into the muscle every 30 days       aspirin 81 MG tablet Take 1 tablet (81 mg) by mouth daily 30 tablet 3     sodium bicarbonate 650 MG tablet Take 2 tablets (1,300 mg) by mouth 3 times daily       nitroGLYcerin (NITROSTAT) 0.4 MG sublingual tablet Place 1 tablet (0.4 mg) under the tongue every 5 minutes as needed for chest pain 25 tablet 0     gabapentin (NEURONTIN) 300 MG capsule Take 1 capsule (300 mg) by mouth At Bedtime 90 capsule      pravastatin (PRAVACHOL) 40 MG tablet Take 1 tablet (40 mg) by mouth daily 90 tablet 3     metoprolol (LOPRESSOR) 50 MG tablet Take 0.5 tablets (25 mg) by mouth 2 times daily 180 tablet 3     amLODIPine (NORVASC) 5 MG tablet Take 2 tablets (10 mg) by mouth daily 30 tablet 3     calcium acetate (PHOSLO) 667 MG CAPS capsule Take 1 capsule (667 mg) by mouth 3 times daily (with meals) 180 capsule      pentoxifylline (TRENTAL) 400 MG CR tablet Take 1 tablet (400 mg) by mouth daily       Lactobacillus (ACIDOPHILUS) tablet Take 1 tablet by mouth daily       ORDER FOR DME Equipment being ordered: cpap machine, mask, humidifier, tubing and filters 1 Device 0     Medications reviewed:  Medications reconciled to facility chart and changes were made to reflect current medications as identified as above med list. Below are the changes that were made:   Medications stopped since last EPIC medication reconciliation:   There are no discontinued  medications.    Medications started since last [facility] medication reconciliation:  No orders of the defined types were placed in this encounter.    REVIEW OF SYSTEMS:  10 point ROS of systems including Constitutional, Eyes, Respiratory, Cardiovascular, Gastroenterology, Genitourinary, Integumentary, Muscularskeletal, Psychiatric were all negative except for pertinent positives noted in my HPI.    Physical Exam:  /79  Pulse 88  Temp 98.3  F (36.8  C)  Resp 20  Wt 166 lb 4.8 oz (75.4 kg)  SpO2 92%  BMI 28.55 kg/m2  GENERAL APPEARANCE:  Alert, in no distress  RESP:  respiratory effort and palpation of chest normal, auscultation of lungs with bilat mild rhonchi, no respiratory distress, on RA  CV:  Palpation and auscultation of heart done , rate and rhythm regular, no murmur, mild LE peripheral edema, PVD changes in LEs.   ABDOMEN:  normal bowel sounds, soft, nontender, no hepatosplenomegaly or other masses  M/S:   Gait and station ambulatory with walker, in WC today, Digits and nails at baseline, reduced muscle mass  SKIN:  Inspection and Palpation of skin and subcutaneous tissue with LEs RASHIDA - healing stasis ulcers without slough, no drainage.  PVD changes present.   PSYCH:  insight and judgement, memory intact, affect and mood normal, follows commands readily         Recent Labs:      9/8/17  BASIC METAB PROFILE  SODIUM 146 mmol/L 136-145 H Final  POTASSIUM 4.9 mmol/L 3.5-5.1 Final  CHLORIDE 115 mmol/L  H Final  CARBON DIOXIDE 20 mmol/L 21-32 L Final  BUN (UREA NITRO) 40 mg/dL 7-24 H Final  CREATININE 4.79 mg/dL 0.55-1.02 H Final  EST GFR (MDRD) 9 mL/min >60 L Final  EST GFR IF AFRICAN AM 11 mL/min >60 L Final  GLUCOSE 49 mg/dL  LL Final  Critical Result(s) Called at 10:22:30 09/08/2017 to and read back by Rajinder Haywoodse sedrick  CALCIUM, SERUM 6.4 mg/dL 8.5-10.1 LL Final  Critical Result(s) Called at 10:24:04 09/08/2017 to and read back by Rajinder Dobbs  ANION GAP 11.0 mmol/L  0.0-15.0 Final  CBC RESULTS:   Recent Labs   Lab Test  09/06/17   0741  09/01/17 0637  08/31/17   0627   WBC   --   5.1  5.4   RBC   --   3.17*  3.02*   HGB  8.6*  8.2*  7.7*   HCT  28.8*  27.9*  26.0*   MCV   --   88  86   MCH   --   25.9*  25.5*   MCHC   --   29.4*  29.6*   RDW   --   19.6*  20.2*   PLT   --   265  256       Last Basic Metabolic Panel:  Recent Labs   Lab Test  09/06/17 0741  09/01/17 0637   NA  147*  145*   POTASSIUM  5.8*  3.5   CHLORIDE  114*  106   MALICK  6.6*  6.1*   CO2  26  31   BUN  36*  37*   CR  4.67*  4.78*   GLC  102*  101*       Liver Function Studies -   Recent Labs   Lab Test  09/06/17 0741  08/27/17   0836  08/27/17   0607 08/26/17 0637   08/12/17   1616   PROTTOTAL   --    --    --   5.2*   --   4.7*   ALBUMIN  2.0*   --   1.8*  1.8*   < >  1.9*   BILITOTAL   --   0.2   --   0.3   --   0.2   ALKPHOS   --    --    --   292*   --   216*   AST   --    --    --   34   --   32   ALT   --    --    --   80*   --   44    < > = values in this interval not displayed.       TSH   Date Value Ref Range Status   04/28/2017 0.75 0.40 - 4.00 mU/L Final   01/10/2017 1.03 0.40 - 4.00 mU/L Final   ]    Lab Results   Component Value Date    A1C 7.3 06/22/2017    A1C 6.2 03/31/2017 9/5/17  SODIUM 146 mmol/L 136-145 H Final  POTASSIUM 4.8 mmol/L 3.5-5.1 Final  CHLORIDE 110 mmol/L  Final  CARBON DIOXIDE 25 mmol/L 21-32 Final  BUN (UREA NITRO) 33 mg/dL 7-24 H Final  CREATININE 4.99 mg/dL 0.55-1.02 H Final  EST GFR (MDRD) 8 mL/min >60 L Final  EST GFR IF AFRICAN AM 10 mL/min >60 L Final  GLUCOSE 140 mg/dL  H Final  CALCIUM, SERUM 6.7 mg/dL 8.5-10.1 LL Final      ASSESSMENT/PLAN:  Chronic diastolic heart failure (H)  Essential hypertension with goal blood pressure less than 140/90  Cardio-Renal status tenuous  Patient recently in hospital for CHF exacerbation, given IV Lasix 40 mg BID with improvement.       Currently on torsemide 20 mg BID (prev off).  Remains on amlodipine 10 mg  daily, ASA 81 mg daily,  Metoprolol 25 mg BID, pravastatin 40 mg daily.     Patient given IV 40 mg Lasix at [facility] visit on 17 and was given Metolazone 2.5 mg yesterday 30 min prior to torsemide dose.    ProBNP 17 - 18,778     BPs 120-140s/70s  HRs 80s     Patient denies CP, HA, lightheadedness      Weights prior to hospitalization 180s --> now 160s, although on the rise:  : 162.4  : 165.4  : 167.4 - extra lasix ([facility]) given  : 166.3 - 2.5 mg metolazone given   - 171 - patient noted this was prior to going to the bathroom and having BM    LS today with rhonchi bilat but less than previous visit.  Has history of pleural effusions with fluid overload. Patient denies SOB and notes sats are 92+% on RA.      Will monitor weights, LS, edema.  No extra diuresis today.      Chronic kidney disease, stage III (moderate)  Metabolic acidosis  Previous hospitalization for worsening labs, no HD done at that time  F/u with Dr. Barfield, Nephrology on 17 and discussion regarding dialysis potential.    Since then patient hospitalized again for CHF exacerbation and now seeing [facility] NP   Patient on torsemide (was given one time dose of metolazone on 17)     F/u appt with Dr. Barfield - 17.      On NaHCO3 1300 mg TID - last CO2 (today, 17) - 20  Per records - baseline CO2 is 21   Will monitor for need to titrate.      17: BUN 36, Creat 4.67, GFR 9, Phos 4.9  17: BUN 40, Creat 4.79, GFR 9    Stable but not improving with diuresis.       Atrial fibrillation with rapid ventricular response (H)  F/y Dr. King, Cardiology (last visit 8/10/17)  S/p ablation 2017  Coumadin restarted after afib noted on telemetry during this last hospitalization.    Thought likely related to hypervolemia.  Converted to NSR by DC - on Metoprolol 25 mg BID.  Zio patch ordered for monitoring. - patient wearing today on left chest     HR regular today  INR today 4.1  Coumadin dosin/1 -  2.5  9/2 - 2.5  9/3 - 5mg  9/4 - 7.5 mg  9/5 - 7.5 mg  9/6 - 5 mg  9/7 - 2.5  (32.5 mg over the last 7 days)     Will dose Coumadin for next 3 days and recheck INR.       Hypocalcemia  Chronic from renal failure. Corrected calcium 7.5 during hospitalization.  Ca level today 6.4 (8.0 corrected: today)  Added Calcitriol 0.5 mcg last hospitalization  Remains on calcium acetate and calcium carbonate.  Likely can DC calcium acetate but will await corrected calcium to be closer to normal range and then can DC.    Also appreciate Nephrology recommendation.      Remains on Vit D 2000 units daily.      Hyperkalemia  K 4.9 on today's labs  Patient given IV Lasix at 9/6/17 clinic visit and 2.5 mg metolazone on 9/7/17 before torsemide dose  Also received kayexalate x 1 dose - 9/6/17  Will monitor.     Decreased hearing of left ear  Nasal fluticasone started last hospitalization for clear fluid noted behind TM, likely 2/2 eustachion tube dysfucntion  Patient noting improvement and now able to hear better  Stable.      Venous ulcer  venous stasis ulcers. Vascular Surgery consulted during previous hospitalization and noted ultimately no microvascular disease.    Wound Care and Dermatology followed in hospital.   Improvement in ulcers  Will monitor.   Continues pentoxifylline 400 gm daily  HUC to please help with scheduling Dermatology and Vascular appts.      Dysuria  Cephalexin x 2 days to finish 10 day course - now complete  Patient denies symptoms  Will monitor.      Type 2 diabetes mellitus with other circulatory complications (H)        Lab Results   Component Value Date     A1C 7.3 06/22/2017     A1C 6.2 03/31/2017     A1C 6.6 07/03/2016     A1C 7.1 04/12/2016     A1C 6.0 06/08/2015   On no meds for DM2  On ASA, statin, no ACEI d/t CKD4     Will monitor.      Neuropathy in bilat heels (and RLS) - takes gabapentin 300 mg qHS        Pleural effusion  Likely 2/2 fluid overload  Was given IV Lasix while in the hospital and also  was given 40 mg IV Lasix 9/6/17 at clinic visit  LS today with rhonchi bilat (but improved)  See above for POC with diuretics.     Anemia of chronic renal failure, stage 4 (severe) (H)  Baseline Hgb 8-9  8/22/17 - Hgb 7.8 - Nephrologist consulted [facility]emia Services (Yesy Samaniego)  On Fe Supplements 325 mg daily.         Hemoglobin   Date Value Ref Range Status   09/06/2017 8.6 (L) 11.7 - 15.7 g/dL Final    will increase supplements to BID.      Chronic diarrhea  Potential 2/2 antibiotics which are now complete.  c diff neg while in hospital  Patient noting it is slowly improving  Will monitor.      Physical deconditioning  Multiple hospitalizations and TCU stays  Physical Therapy/ Occupational Therapy for rehab      Progress report from therapy:  Petros Baer prev --> now 15  SLUMS 28  Ambulating 250 ft with 4WW - limited by fatigue  SBA for ADLs.       Orders:  1. Increase FE sulfate to BID Dx: Anemia  2. Knee joce yuniel , on in am, off in PM, Dx: Edema  3. INR today 4.1   Hold Coumadin tonight(9/8/17) coumadin 2mg on Saturday(9/9/17) and 4mg on Sunday(9/10/17)   4. Recheck INR on Monday 9/11/17 Dx: afib    Electronically signed by  RHODA Bowden CNP

## 2017-09-11 ENCOUNTER — CARE COORDINATION (OUTPATIENT)
Dept: CARE COORDINATION | Facility: CLINIC | Age: 77
End: 2017-09-11

## 2017-09-11 ENCOUNTER — NURSING HOME VISIT (OUTPATIENT)
Dept: GERIATRICS | Facility: CLINIC | Age: 77
End: 2017-09-11
Payer: COMMERCIAL

## 2017-09-11 VITALS
OXYGEN SATURATION: 94 % | RESPIRATION RATE: 18 BRPM | BODY MASS INDEX: 29.66 KG/M2 | TEMPERATURE: 98 F | DIASTOLIC BLOOD PRESSURE: 82 MMHG | WEIGHT: 172.8 LBS | SYSTOLIC BLOOD PRESSURE: 148 MMHG | HEART RATE: 89 BPM

## 2017-09-11 DIAGNOSIS — R30.0 DYSURIA: ICD-10-CM

## 2017-09-11 DIAGNOSIS — N18.30 CHRONIC KIDNEY DISEASE, STAGE III (MODERATE) (H): ICD-10-CM

## 2017-09-11 DIAGNOSIS — N18.4 ACUTE RENAL FAILURE WITH ACUTE TUBULAR NECROSIS SUPERIMPOSED ON STAGE 4 CHRONIC KIDNEY DISEASE (H): Primary | ICD-10-CM

## 2017-09-11 DIAGNOSIS — D63.1 ANEMIA OF CHRONIC RENAL FAILURE, STAGE 4 (SEVERE) (H): ICD-10-CM

## 2017-09-11 DIAGNOSIS — R53.81 PHYSICAL DECONDITIONING: ICD-10-CM

## 2017-09-11 DIAGNOSIS — H91.92 DECREASED HEARING OF LEFT EAR: ICD-10-CM

## 2017-09-11 DIAGNOSIS — J90 PLEURAL EFFUSION: ICD-10-CM

## 2017-09-11 DIAGNOSIS — E87.5 HYPERKALEMIA: ICD-10-CM

## 2017-09-11 DIAGNOSIS — I50.32 CHRONIC DIASTOLIC HEART FAILURE (H): Primary | ICD-10-CM

## 2017-09-11 DIAGNOSIS — E87.20 METABOLIC ACIDOSIS: ICD-10-CM

## 2017-09-11 DIAGNOSIS — E83.51 HYPOCALCEMIA: ICD-10-CM

## 2017-09-11 DIAGNOSIS — I83.009 VENOUS ULCER (H): ICD-10-CM

## 2017-09-11 DIAGNOSIS — K52.9 CHRONIC DIARRHEA: ICD-10-CM

## 2017-09-11 DIAGNOSIS — L97.909 VENOUS ULCER (H): ICD-10-CM

## 2017-09-11 DIAGNOSIS — N18.4 ANEMIA OF CHRONIC RENAL FAILURE, STAGE 4 (SEVERE) (H): ICD-10-CM

## 2017-09-11 DIAGNOSIS — I10 ESSENTIAL HYPERTENSION WITH GOAL BLOOD PRESSURE LESS THAN 140/90: ICD-10-CM

## 2017-09-11 DIAGNOSIS — N17.0 ACUTE RENAL FAILURE WITH ACUTE TUBULAR NECROSIS SUPERIMPOSED ON STAGE 4 CHRONIC KIDNEY DISEASE (H): Primary | ICD-10-CM

## 2017-09-11 DIAGNOSIS — I48.91 ATRIAL FIBRILLATION WITH RAPID VENTRICULAR RESPONSE (H): ICD-10-CM

## 2017-09-11 DIAGNOSIS — E11.59 TYPE 2 DIABETES MELLITUS WITH OTHER CIRCULATORY COMPLICATIONS (H): ICD-10-CM

## 2017-09-11 PROCEDURE — 99309 SBSQ NF CARE MODERATE MDM 30: CPT | Performed by: NURSE PRACTITIONER

## 2017-09-11 RX ORDER — LOPERAMIDE HCL 2 MG
2 CAPSULE ORAL 3 TIMES DAILY PRN
Status: ON HOLD | COMMUNITY
End: 2017-09-15

## 2017-09-11 RX ORDER — TORSEMIDE 20 MG/1
40 TABLET ORAL DAILY
Status: ON HOLD | COMMUNITY
End: 2017-09-15

## 2017-09-11 NOTE — PROGRESS NOTES
Clinic Care Coordination Contact    Situation: Patient chart reviewed by care coordinator.    Background: Clinical Data: Patient was hospitalized at [facility] from 8/26 to 9/1 with diagnosis of respiatory distress. Pt was then Transitioned to Facility  [facility].        Assessment: Recent notes from cardiology pt will have another two weeks at TCU.     Plan/Recommendations:  RN CC will continue to monitor for changes or discharge.    Meenakshi Larsen RN, BSN  [place] [address]  Office: [phone]  Fax [phone]   [email or web address]   [email or web address]     Connect with [facility] on social media.

## 2017-09-11 NOTE — PROGRESS NOTES
[facility]    Chief Complaint   Patient presents with     Nursing Home Acute       HPI:    Kathryn Banks is a 77 year old  (1940), who is being seen today for an episodic care visit [facility].  HPI information obtained from: facility chart records, facility staff, patient report and [facility] chart review.    Today's concern is:     Chronic diastolic heart failure (H)  Essential hypertension with goal blood pressure less than 140/90  Chronic kidney disease, stage III (moderate)  Atrial fibrillation with rapid ventricular response (H)  Hypocalcemia  Decreased hearing of left ear  Venous ulcer  Metabolic acidosis  Dysuria  Type 2 diabetes mellitus with other circulatory complications (H)  Pleural effusion  Anemia of chronic renal failure, stage 4 (severe) (H)  Chronic diarrhea  Physical deconditioning  Hyperkalemia     Patient is met today in her room at [facility]CU.  She reports she has developed a productive cough with clear sputum and feels her legs are more edematous.  She also reports persistent diarrhea and although improving, it is still persistent and she notes she is trying to stay away from dairy and see if this helps her diarrhea.      She denies CP, heartburn, upset stomach, fever/chills, pain.      ALLERGIES: Ciprofloxacin; Oxycodone; Lisinopril; and Sulfa drugs  Past Medical, Surgical, Family and Social History reviewed and updated in [facility].    Current Outpatient Prescriptions   Medication Sig Dispense Refill     torsemide (DEMADEX) 20 MG tablet Take 40 mg by mouth daily And 20mg PO QD       loperamide (IMODIUM) 2 MG capsule Take 2 mg by mouth 3 times daily as needed for diarrhea       ferrous sulfate (IRON) 325 (65 FE) MG tablet Take 325 mg by mouth 2 times daily       Warfarin Sodium (COUMADIN PO) Take by mouth daily Take as directed per INR results       fluticasone (FLONASE) 50 MCG/ACT spray Spray 1 spray into both nostrils daily 1 Bottle 0      guaiFENesin-dextromethorphan (ROBITUSSIN DM) 100-10 MG/5ML syrup Take 5 mLs by mouth every 8 hours as needed for cough or congestion 560 mL 0     calcitRIOL (ROCALTROL) 0.5 MCG capsule Take 1 capsule (0.5 mcg) by mouth daily       Calcium Carb-Cholecalciferol 500-400 MG-UNIT TABS Take 1 tablet by mouth 2 times daily       ipratropium - albuterol 0.5 mg/2.5 mg/3 mL (DUONEB) 0.5-2.5 (3) MG/3ML neb solution Take 1 vial by nebulization 3 times daily       ACETAMINOPHEN PO Take 650 mg by mouth every 4 hours as needed for pain For pain 1-5 out of 10       TRAMADOL HCL PO Take 50 mg by mouth every 6 hours as needed for moderate to severe pain Pain of 6-10 out of 10       cyanocobalamin (VITAMIN B12) 1000 MCG/ML injection Inject 1 mL into the muscle every 30 days       aspirin 81 MG tablet Take 1 tablet (81 mg) by mouth daily 30 tablet 3     sodium bicarbonate 650 MG tablet Take 2 tablets (1,300 mg) by mouth 3 times daily       nitroGLYcerin (NITROSTAT) 0.4 MG sublingual tablet Place 1 tablet (0.4 mg) under the tongue every 5 minutes as needed for chest pain 25 tablet 0     gabapentin (NEURONTIN) 300 MG capsule Take 1 capsule (300 mg) by mouth At Bedtime 90 capsule      pravastatin (PRAVACHOL) 40 MG tablet Take 1 tablet (40 mg) by mouth daily 90 tablet 3     metoprolol (LOPRESSOR) 50 MG tablet Take 0.5 tablets (25 mg) by mouth 2 times daily 180 tablet 3     amLODIPine (NORVASC) 5 MG tablet Take 2 tablets (10 mg) by mouth daily 30 tablet 3     calcium acetate (PHOSLO) 667 MG CAPS capsule Take 1 capsule (667 mg) by mouth 3 times daily (with meals) 180 capsule      pentoxifylline (TRENTAL) 400 MG CR tablet Take 1 tablet (400 mg) by mouth daily       Lactobacillus (ACIDOPHILUS) tablet Take 1 tablet by mouth daily       ORDER FOR DME Equipment being ordered: cpap machine, mask, humidifier, tubing and filters 1 Device 0     Medications reviewed:  Medications reconciled to facility chart and changes were made to reflect current  medications as identified as above med list. Below are the changes that were made:   Medications stopped since last EPIC medication reconciliation:   There are no discontinued medications.    Medications started since last [facility] medication reconciliation:  No orders of the defined types were placed in this encounter.    REVIEW OF SYSTEMS:  10 point ROS of systems including Constitutional, Eyes, Respiratory, Cardiovascular, Gastroenterology, Genitourinary, Integumentary, Muscularskeletal, Psychiatric were all negative except for pertinent positives noted in my HPI.    Physical Exam:  /82  Pulse 89  Temp 98  F (36.7  C)  Resp 18  Wt 172 lb 12.8 oz (78.4 kg)  SpO2 94%  BMI 29.66 kg/m2  GENERAL APPEARANCE:  Alert, in no distress  RESP:  respiratory effort and palpation of chest normal, auscultation of lungs clear with faint wheezing, no respiratory distress, on RA  CV:  Palpation and auscultation of heart done , rate and rhythm regular, no murmur, mild to moderate LE peripheral edema (taut skin), PVD changes in LEs with venous status ulcers present.   ABDOMEN:  normal bowel sounds, soft, nontender, no hepatosplenomegaly or other masses  M/S:   Gait and station ambulatory with walker, in WC today, Digits and nails at baseline, reduced muscle mass  SKIN:  Inspection and Palpation of skin and subcutaneous tissue with LEs RASHIDA - healing stasis ulcers without slough, no drainage, LE skin (however) overall moist from perhaps extra fluid.  PVD changes present.   PSYCH:  insight and judgement, memory intact, affect and mood normal, follows commands readily         Recent Labs:      9/8/17  BASIC METAB PROFILE  SODIUM 146 mmol/L 136-145 H Final  POTASSIUM 4.9 mmol/L 3.5-5.1 Final  CHLORIDE 115 mmol/L  H Final  CARBON DIOXIDE 20 mmol/L 21-32 L Final  BUN (UREA NITRO) 40 mg/dL 7-24 H Final  CREATININE 4.79 mg/dL 0.55-1.02 H Final  EST GFR (MDRD) 9 mL/min >60 L Final  EST GFR IF AFRICAN AM 11 mL/min >60 L  Final  GLUCOSE 49 mg/dL  LL Final  Critical Result(s) Called at 10:22:30 09/08/2017 to and read back by Rajinder damico  [facility]  CALCIUM, SERUM 6.4 mg/dL 8.5-10.1 LL Final  Critical Result(s) Called at 10:24:04 09/08/2017 to and read back by Rajinder damico  [facility]  ANION GAP 11.0 mmol/L 0.0-15.0 Final  CBC RESULTS:   Recent Labs   Lab Test  09/06/17   0741  09/01/17   0637  08/31/17   0627   WBC   --   5.1  5.4   RBC   --   3.17*  3.02*   HGB  8.6*  8.2*  7.7*   HCT  28.8*  27.9*  26.0*   MCV   --   88  86   MCH   --   25.9*  25.5*   MCHC   --   29.4*  29.6*   RDW   --   19.6*  20.2*   PLT   --   265  256       Last Basic Metabolic Panel:  Recent Labs   Lab Test  09/06/17   0741  09/01/17 0637   NA  147*  145*   POTASSIUM  5.8*  3.5   CHLORIDE  114*  106   MALICK  6.6*  6.1*   CO2  26  31   BUN  36*  37*   CR  4.67*  4.78*   GLC  102*  101*       Liver Function Studies -   Recent Labs   Lab Test  09/06/17   0741  08/27/17   0836  08/27/17   0607  08/26/17   0637   08/12/17   1616   PROTTOTAL   --    --    --   5.2*   --   4.7*   ALBUMIN  2.0*   --   1.8*  1.8*   < >  1.9*   BILITOTAL   --   0.2   --   0.3   --   0.2   ALKPHOS   --    --    --   292*   --   216*   AST   --    --    --   34   --   32   ALT   --    --    --   80*   --   44    < > = values in this interval not displayed.       TSH   Date Value Ref Range Status   04/28/2017 0.75 0.40 - 4.00 mU/L Final   01/10/2017 1.03 0.40 - 4.00 mU/L Final   ]    Lab Results   Component Value Date    A1C 7.3 06/22/2017    A1C 6.2 03/31/2017 9/5/17  SODIUM 146 mmol/L 136-145 H Final  POTASSIUM 4.8 mmol/L 3.5-5.1 Final  CHLORIDE 110 mmol/L  Final  CARBON DIOXIDE 25 mmol/L 21-32 Final  BUN (UREA NITRO) 33 mg/dL 7-24 H Final  CREATININE 4.99 mg/dL 0.55-1.02 H Final  EST GFR (MDRD) 8 mL/min >60 L Final  EST GFR IF AFRICAN AM 10 mL/min >60 L Final  GLUCOSE 140 mg/dL  H Final  CALCIUM, SERUM 6.7 mg/dL 8.5-10.1 LL  Final      ASSESSMENT/PLAN:  Chronic diastolic heart failure (H)  Essential hypertension with goal blood pressure less than 140/90  Cardio-Renal status tenuous  Patient recently in hospital for CHF exacerbation, given IV Lasix 40 mg BID with improvement.    Now followed by [facility], Destiny Berrios NP     Currently on torsemide 20 mg BID (prev off).  Remains on amlodipine 10 mg daily, ASA 81 mg daily,  Metoprolol 25 mg BID, pravastatin 40 mg daily.     ProBNP 9/6/17 - 18,778  IV 40 mg Lasix at [facility] - 9/6/17   Metolazone 2.5 mg 9/7/17       BPs 130-140s/70-80s  HRs 80s     Patient denies CP, HA: occasional lightheadedness      Weights prior to hospitalization 180s --> now 160s, although on the rise:  9/4: 162.4  9/5: 165.4  9/6: 167.4 - extra lasix ([facility]) given  9/7: 166.3 - 2.5 mg metolazone given  9/8 - 171  9/9 - 171  9/10 - 170.2  9/11 - 172.8    LS today with mild wheeze; has history of pleural effusions with fluid overload. Patient reports SOB with productive cough (clear sputum)     With LE edema, increased weight, SOB with productive cough - will increase diuretics.      Chronic kidney disease, stage III (moderate)  Metabolic acidosis  Previous hospitalization for worsening labs, no HD done at that time  F/u with Dr. Barfield, Nephrology on 8/22/17 and discussion regarding dialysis potential.    Since then patient hospitalized again for CHF exacerbation and now [email or web address]   Patient on torsemide    F/u appt with Dr. Barfield - 9/19/17.      On NaHCO3 1300 mg TID - last CO2 (9/8/17) - 20  Per records - baseline CO2 is 21   Will monitor for need to titrate.      9/6/17: BUN 36, Creat 4.67, GFR 9, Phos 4.9  9/8/17: BUN 40, Creat 4.79, GFR 9    Discussion today regarding education material Dr. Barfield asked patient to complete prior to next appt.    Noted to patient that there is a computer with internet on the 3rd floor near the gym; she noted she will make use of it and do the  education.        Atrial fibrillation with rapid ventricular response (H)  F/b Dr. King, Cardiology (last visit 8/10/17)  S/p ablation 2017  Coumadin restarted after afib noted on telemetry during this last hospitalization.    Thought likely related to hypervolemia.  Converted to NSR by DC - on Metoprolol 25 mg BID.  Zio patch ordered for monitoring. - patient wearing today on left chest     HR regular today  INR today 3.5  Coumadin dosin/1 - 2.5  9/2 - 2.5  93 - 5mg  4 - 7.5 mg  5 - 7.5 mg  6 - 5 mg  7 - 2.5   - held   - 2 mg  9/10 - 4 mg     Will dose Coumadin for next 2 days and recheck INR.       Hypocalcemia  Chronic from renal failure. Corrected calcium 7.5 during hospitalization.  Ca level (last) 6.4 (8.0 corrected)  Added Calcitriol 0.5 mcg last hospitalization  Remains on calcium acetate and calcium carbonate.  Likely can DC calcium acetate but will await corrected calcium to be closer to normal range and then can DC.    Also appreciate Nephrology recommendation.      Remains on Vit D 2000 units daily.      Hyperkalemia  Patient given IV Lasix at 17 clinic visit and 2.5 mg metolazone on 17 before torsemide dose  Also received kayexalate x 1 dose - 17  K 4.9 on last labs  Will monitor.     Decreased hearing of left ear  Nasal fluticasone started last hospitalization for clear fluid noted behind TM, likely 2/2 eustachion tube dysfucntion  Patient noting improvement and now able to hear better  Stable.      Venous ulcer  venous stasis ulcers. Vascular Surgery consulted during previous hospitalization and noted ultimately no microvascular disease.    Wound Care and Dermatology followed in hospital.   Improvement in ulcers  Will monitor.   Continues pentoxifylline 400 gm daily       Dysuria  Cephalexin x 2 days to finish 10 day course - now complete  Patient denies symptoms  Will monitor.      Type 2 diabetes mellitus with other circulatory complications (H)        Lab Results    Component Value Date     A1C 7.3 06/22/2017     A1C 6.2 03/31/2017     A1C 6.6 07/03/2016     A1C 7.1 04/12/2016     A1C 6.0 06/08/2015   On no meds for DM2  On ASA, statin, no ACEI d/t CKD4     Will monitor.      Neuropathy in bilat heels (and RLS) - takes gabapentin 300 mg qHS        Pleural effusion  Likely 2/2 fluid overload  IV Lasix while in the hospital  40 mg IV Lasix 9/6/17 at clinic visit  LS today with improved but overall fluid up  Productive cough (clear sputum per patient) present  Patient reports SOB  Will increase diuretics.      Anemia of chronic renal failure, stage 4 (severe) (H)  Baseline Hgb 8-9  8/22/17 - Hgb 7.8 - Nephrologist consulted [facility] (Yesy Samaniego)  On Fe Supplements 325 mg BID         Hemoglobin   Date Value Ref Range Status   09/06/2017 8.6 (L) 11.7 - 15.7 g/dL Final      Chronic diarrhea  Potential 2/2 antibiotics which are now complete.  c diff neg while in hospital  Patient noting it is slowly improving  Will add PRN imodium.   Will monitor.      Physical deconditioning  Multiple hospitalizations and TCU stays  Physical Therapy/ Occupational Therapy for rehab      Progress report from therapy:  Petros 17 prev --> now 15  SLUMS 28  Ambulating 250 ft with 4WW - limited by fatigue  SBA for ADLs.       Orders:  1. ok to change teds to Tubi-  2. Increase AM torsemide to 40mg (so 40mg Qam and 20mg Qafternoon) Dx: CHF  3. BMP, HGB on wed 9/13/17. Dx: CHF, Anemia  4. INR 3.5 today   Hold Coumadin today, Coumadin 2mg PO on 9/12/17 Dx: A-fib  5. Recheck INR on wed 9/13/17 Dx: A-fib  6. Imodium 2mg PO TID PRN Dx: Diarrhea    Electronically signed by  RHODA Bowden CNP

## 2017-09-12 ENCOUNTER — DOCUMENTATION ONLY (OUTPATIENT)
Dept: CARDIOLOGY | Facility: CLINIC | Age: 77
End: 2017-09-12

## 2017-09-13 ENCOUNTER — PRE VISIT (OUTPATIENT)
Dept: CARDIOLOGY | Facility: CLINIC | Age: 77
End: 2017-09-13

## 2017-09-13 ENCOUNTER — HOSPITAL ENCOUNTER (OUTPATIENT)
Facility: CLINIC | Age: 77
Setting detail: OBSERVATION
Discharge: HOME | End: 2017-09-15
Attending: EMERGENCY MEDICINE | Admitting: PEDIATRICS
Payer: MEDICARE

## 2017-09-13 ENCOUNTER — APPOINTMENT (OUTPATIENT)
Dept: GENERAL RADIOLOGY | Facility: CLINIC | Age: 77
End: 2017-09-13
Attending: EMERGENCY MEDICINE
Payer: MEDICARE

## 2017-09-13 ENCOUNTER — NURSING HOME VISIT (OUTPATIENT)
Dept: GERIATRICS | Facility: CLINIC | Age: 77
End: 2017-09-13
Payer: COMMERCIAL

## 2017-09-13 VITALS
OXYGEN SATURATION: 92 % | SYSTOLIC BLOOD PRESSURE: 149 MMHG | DIASTOLIC BLOOD PRESSURE: 79 MMHG | HEART RATE: 88 BPM | BODY MASS INDEX: 29.76 KG/M2 | TEMPERATURE: 97.8 F | RESPIRATION RATE: 18 BRPM | WEIGHT: 173.4 LBS

## 2017-09-13 DIAGNOSIS — E11.59 TYPE 2 DIABETES MELLITUS WITH OTHER CIRCULATORY COMPLICATIONS (H): ICD-10-CM

## 2017-09-13 DIAGNOSIS — N18.30 CHRONIC KIDNEY DISEASE, STAGE III (MODERATE) (H): ICD-10-CM

## 2017-09-13 DIAGNOSIS — N18.4 ANEMIA OF CHRONIC RENAL FAILURE, STAGE 4 (SEVERE) (H): ICD-10-CM

## 2017-09-13 DIAGNOSIS — G25.81 RESTLESS LEG SYNDROME: ICD-10-CM

## 2017-09-13 DIAGNOSIS — E87.20 METABOLIC ACIDOSIS: ICD-10-CM

## 2017-09-13 DIAGNOSIS — E87.5 HYPERKALEMIA: ICD-10-CM

## 2017-09-13 DIAGNOSIS — I27.20 PULMONARY HYPERTENSION (H): ICD-10-CM

## 2017-09-13 DIAGNOSIS — K52.9 CHRONIC DIARRHEA: ICD-10-CM

## 2017-09-13 DIAGNOSIS — I10 ESSENTIAL HYPERTENSION WITH GOAL BLOOD PRESSURE LESS THAN 140/90: ICD-10-CM

## 2017-09-13 DIAGNOSIS — I50.32 CHRONIC DIASTOLIC CONGESTIVE HEART FAILURE (H): Primary | ICD-10-CM

## 2017-09-13 DIAGNOSIS — R30.0 DYSURIA: ICD-10-CM

## 2017-09-13 DIAGNOSIS — D63.1 ANEMIA OF CHRONIC RENAL FAILURE, STAGE 4 (SEVERE) (H): ICD-10-CM

## 2017-09-13 DIAGNOSIS — L97.909 VENOUS ULCER (H): ICD-10-CM

## 2017-09-13 DIAGNOSIS — E83.51 HYPOCALCEMIA: ICD-10-CM

## 2017-09-13 DIAGNOSIS — R53.81 PHYSICAL DECONDITIONING: ICD-10-CM

## 2017-09-13 DIAGNOSIS — R06.09 DYSPNEA ON EXERTION: ICD-10-CM

## 2017-09-13 DIAGNOSIS — I48.20 CHRONIC ATRIAL FIBRILLATION (H): ICD-10-CM

## 2017-09-13 DIAGNOSIS — J90 PLEURAL EFFUSION: ICD-10-CM

## 2017-09-13 DIAGNOSIS — I83.009 VENOUS ULCER (H): ICD-10-CM

## 2017-09-13 DIAGNOSIS — Z95.1 POSTSURGICAL AORTOCORONARY BYPASS STATUS: ICD-10-CM

## 2017-09-13 DIAGNOSIS — H91.92 DECREASED HEARING OF LEFT EAR: ICD-10-CM

## 2017-09-13 DIAGNOSIS — I50.32 CHRONIC DIASTOLIC HEART FAILURE (H): Primary | ICD-10-CM

## 2017-09-13 DIAGNOSIS — A04.72 COLITIS DUE TO CLOSTRIDIUM DIFFICILE: ICD-10-CM

## 2017-09-13 DIAGNOSIS — I48.91 ATRIAL FIBRILLATION WITH RAPID VENTRICULAR RESPONSE (H): ICD-10-CM

## 2017-09-13 PROBLEM — E87.70 VOLUME OVERLOAD: Status: ACTIVE | Noted: 2017-09-13

## 2017-09-13 LAB
ABO + RH BLD: NORMAL
ABO + RH BLD: NORMAL
ALBUMIN UR-MCNC: NEGATIVE MG/DL
ANION GAP SERPL CALCULATED.3IONS-SCNC: 14 MMOL/L (ref 3–14)
APPEARANCE UR: CLEAR
BACTERIA #/AREA URNS HPF: ABNORMAL /HPF
BASOPHILS # BLD AUTO: 0.1 10E9/L (ref 0–0.2)
BASOPHILS NFR BLD AUTO: 1 %
BILIRUB UR QL STRIP: NEGATIVE
BLD GP AB SCN SERPL QL: NORMAL
BLOOD BANK CMNT PATIENT-IMP: NORMAL
BUN SERPL-MCNC: 58 MG/DL (ref 7–30)
CALCIUM SERPL-MCNC: 6.4 MG/DL (ref 8.5–10.1)
CHLORIDE SERPL-SCNC: 114 MMOL/L (ref 94–109)
CO2 SERPL-SCNC: 17 MMOL/L (ref 20–32)
COLOR UR AUTO: ABNORMAL
CREAT SERPL-MCNC: 4.31 MG/DL (ref 0.52–1.04)
DIFFERENTIAL METHOD BLD: ABNORMAL
EOSINOPHIL # BLD AUTO: 0.4 10E9/L (ref 0–0.7)
EOSINOPHIL NFR BLD AUTO: 4.1 %
ERYTHROCYTE [DISTWIDTH] IN BLOOD BY AUTOMATED COUNT: 17.6 % (ref 10–15)
GFR SERPL CREATININE-BSD FRML MDRD: 10 ML/MIN/1.7M2
GLUCOSE SERPL-MCNC: 149 MG/DL (ref 70–99)
GLUCOSE UR STRIP-MCNC: 30 MG/DL
HCT VFR BLD AUTO: 28.4 % (ref 35–47)
HGB BLD-MCNC: 8.6 G/DL (ref 11.7–15.7)
HGB UR QL STRIP: ABNORMAL
IMM GRANULOCYTES # BLD: 0.1 10E9/L (ref 0–0.4)
IMM GRANULOCYTES NFR BLD: 1 %
INR PPP: 3.18 (ref 0.86–1.14)
KETONES UR STRIP-MCNC: NEGATIVE MG/DL
LEUKOCYTE ESTERASE UR QL STRIP: ABNORMAL
LYMPHOCYTES # BLD AUTO: 1.3 10E9/L (ref 0.8–5.3)
LYMPHOCYTES NFR BLD AUTO: 13.6 %
MCH RBC QN AUTO: 25.7 PG (ref 26.5–33)
MCHC RBC AUTO-ENTMCNC: 30.3 G/DL (ref 31.5–36.5)
MCV RBC AUTO: 85 FL (ref 78–100)
MONOCYTES # BLD AUTO: 0.6 10E9/L (ref 0–1.3)
MONOCYTES NFR BLD AUTO: 6.5 %
NEUTROPHILS # BLD AUTO: 6.8 10E9/L (ref 1.6–8.3)
NEUTROPHILS NFR BLD AUTO: 73.8 %
NITRATE UR QL: NEGATIVE
NRBC # BLD AUTO: 0 10*3/UL
NRBC BLD AUTO-RTO: 0 /100
NT-PROBNP SERPL-MCNC: ABNORMAL PG/ML (ref 0–1800)
PH UR STRIP: 5 PH (ref 5–7)
PLATELET # BLD AUTO: 316 10E9/L (ref 150–450)
POTASSIUM SERPL-SCNC: 3.6 MMOL/L (ref 3.4–5.3)
RBC # BLD AUTO: 3.35 10E12/L (ref 3.8–5.2)
RBC #/AREA URNS AUTO: 2 /HPF (ref 0–2)
SODIUM SERPL-SCNC: 144 MMOL/L (ref 133–144)
SOURCE: ABNORMAL
SP GR UR STRIP: 1.01 (ref 1–1.03)
SPECIMEN EXP DATE BLD: NORMAL
SQUAMOUS #/AREA URNS AUTO: 2 /HPF (ref 0–1)
TRANS CELLS #/AREA URNS HPF: <1 /HPF (ref 0–1)
UROBILINOGEN UR STRIP-MCNC: NORMAL MG/DL (ref 0–2)
WBC # BLD AUTO: 9.2 10E9/L (ref 4–11)
WBC #/AREA URNS AUTO: 85 /HPF (ref 0–2)
YEAST #/AREA URNS HPF: ABNORMAL /HPF

## 2017-09-13 PROCEDURE — 85025 COMPLETE CBC W/AUTO DIFF WBC: CPT | Performed by: EMERGENCY MEDICINE

## 2017-09-13 PROCEDURE — 85610 PROTHROMBIN TIME: CPT | Performed by: EMERGENCY MEDICINE

## 2017-09-13 PROCEDURE — 71020 XR CHEST 2 VW: CPT

## 2017-09-13 PROCEDURE — 99219 ZZC INITIAL OBSERVATION CARE,LEVL II: CPT | Mod: AI | Performed by: PEDIATRICS

## 2017-09-13 PROCEDURE — 83880 ASSAY OF NATRIURETIC PEPTIDE: CPT | Performed by: EMERGENCY MEDICINE

## 2017-09-13 PROCEDURE — 25000128 H RX IP 250 OP 636: Performed by: EMERGENCY MEDICINE

## 2017-09-13 PROCEDURE — 81001 URINALYSIS AUTO W/SCOPE: CPT | Performed by: EMERGENCY MEDICINE

## 2017-09-13 PROCEDURE — G0378 HOSPITAL OBSERVATION PER HR: HCPCS

## 2017-09-13 PROCEDURE — 99285 EMERGENCY DEPT VISIT HI MDM: CPT | Mod: 25 | Performed by: EMERGENCY MEDICINE

## 2017-09-13 PROCEDURE — 86850 RBC ANTIBODY SCREEN: CPT | Performed by: EMERGENCY MEDICINE

## 2017-09-13 PROCEDURE — 96374 THER/PROPH/DIAG INJ IV PUSH: CPT | Performed by: EMERGENCY MEDICINE

## 2017-09-13 PROCEDURE — 86901 BLOOD TYPING SEROLOGIC RH(D): CPT | Performed by: EMERGENCY MEDICINE

## 2017-09-13 PROCEDURE — 99310 SBSQ NF CARE HIGH MDM 45: CPT | Performed by: NURSE PRACTITIONER

## 2017-09-13 PROCEDURE — 93010 ELECTROCARDIOGRAM REPORT: CPT | Mod: Z6 | Performed by: EMERGENCY MEDICINE

## 2017-09-13 PROCEDURE — 80048 BASIC METABOLIC PNL TOTAL CA: CPT | Performed by: EMERGENCY MEDICINE

## 2017-09-13 PROCEDURE — 93005 ELECTROCARDIOGRAM TRACING: CPT | Performed by: EMERGENCY MEDICINE

## 2017-09-13 PROCEDURE — 86900 BLOOD TYPING SEROLOGIC ABO: CPT | Performed by: EMERGENCY MEDICINE

## 2017-09-13 RX ORDER — ACETAMINOPHEN 325 MG/1
650 TABLET ORAL EVERY 4 HOURS PRN
Status: DISCONTINUED | OUTPATIENT
Start: 2017-09-13 | End: 2017-09-15 | Stop reason: HOSPADM

## 2017-09-13 RX ORDER — FERROUS SULFATE 325(65) MG
325 TABLET ORAL 2 TIMES DAILY
Status: DISCONTINUED | OUTPATIENT
Start: 2017-09-13 | End: 2017-09-15 | Stop reason: HOSPADM

## 2017-09-13 RX ORDER — NALOXONE HYDROCHLORIDE 0.4 MG/ML
.1-.4 INJECTION, SOLUTION INTRAMUSCULAR; INTRAVENOUS; SUBCUTANEOUS
Status: DISCONTINUED | OUTPATIENT
Start: 2017-09-13 | End: 2017-09-15 | Stop reason: HOSPADM

## 2017-09-13 RX ORDER — PRAVASTATIN SODIUM 40 MG
40 TABLET ORAL DAILY
Status: DISCONTINUED | OUTPATIENT
Start: 2017-09-14 | End: 2017-09-15 | Stop reason: HOSPADM

## 2017-09-13 RX ORDER — CYANOCOBALAMIN 1000 UG/ML
1000 INJECTION, SOLUTION INTRAMUSCULAR; SUBCUTANEOUS
Status: DISCONTINUED | OUTPATIENT
Start: 2017-09-13 | End: 2017-09-15 | Stop reason: HOSPADM

## 2017-09-13 RX ORDER — FLUTICASONE PROPIONATE 50 MCG
1 SPRAY, SUSPENSION (ML) NASAL DAILY
Status: DISCONTINUED | OUTPATIENT
Start: 2017-09-14 | End: 2017-09-15 | Stop reason: HOSPADM

## 2017-09-13 RX ORDER — CALCITRIOL 0.5 UG/1
0.5 CAPSULE, LIQUID FILLED ORAL DAILY
Status: DISCONTINUED | OUTPATIENT
Start: 2017-09-14 | End: 2017-09-15 | Stop reason: HOSPADM

## 2017-09-13 RX ORDER — AMOXICILLIN 250 MG
1-2 CAPSULE ORAL 2 TIMES DAILY PRN
Status: DISCONTINUED | OUTPATIENT
Start: 2017-09-13 | End: 2017-09-15 | Stop reason: HOSPADM

## 2017-09-13 RX ORDER — SODIUM BICARBONATE 650 MG/1
1300 TABLET ORAL 3 TIMES DAILY
Status: DISCONTINUED | OUTPATIENT
Start: 2017-09-14 | End: 2017-09-15 | Stop reason: HOSPADM

## 2017-09-13 RX ORDER — PENTOXIFYLLINE 400 MG/1
400 TABLET, EXTENDED RELEASE ORAL DAILY
Status: DISCONTINUED | OUTPATIENT
Start: 2017-09-14 | End: 2017-09-15 | Stop reason: HOSPADM

## 2017-09-13 RX ORDER — TRAMADOL HYDROCHLORIDE 50 MG/1
50 TABLET ORAL EVERY 6 HOURS PRN
Status: DISCONTINUED | OUTPATIENT
Start: 2017-09-13 | End: 2017-09-15 | Stop reason: HOSPADM

## 2017-09-13 RX ORDER — FUROSEMIDE 10 MG/ML
40 INJECTION INTRAMUSCULAR; INTRAVENOUS ONCE
Status: COMPLETED | OUTPATIENT
Start: 2017-09-13 | End: 2017-09-13

## 2017-09-13 RX ORDER — IPRATROPIUM BROMIDE AND ALBUTEROL SULFATE 2.5; .5 MG/3ML; MG/3ML
1 SOLUTION RESPIRATORY (INHALATION) 3 TIMES DAILY
Status: DISCONTINUED | OUTPATIENT
Start: 2017-09-14 | End: 2017-09-14

## 2017-09-13 RX ORDER — GUAIFENESIN/DEXTROMETHORPHAN 100-10MG/5
5 SYRUP ORAL EVERY 8 HOURS PRN
Status: DISCONTINUED | OUTPATIENT
Start: 2017-09-13 | End: 2017-09-15 | Stop reason: HOSPADM

## 2017-09-13 RX ORDER — POLYETHYLENE GLYCOL 3350 17 G/17G
17 POWDER, FOR SOLUTION ORAL DAILY PRN
Status: DISCONTINUED | OUTPATIENT
Start: 2017-09-13 | End: 2017-09-15 | Stop reason: HOSPADM

## 2017-09-13 RX ORDER — ASPIRIN 81 MG/1
81 TABLET, CHEWABLE ORAL DAILY
Status: DISCONTINUED | OUTPATIENT
Start: 2017-09-14 | End: 2017-09-15 | Stop reason: HOSPADM

## 2017-09-13 RX ORDER — AMLODIPINE BESYLATE 10 MG/1
10 TABLET ORAL DAILY
Status: DISCONTINUED | OUTPATIENT
Start: 2017-09-14 | End: 2017-09-15 | Stop reason: HOSPADM

## 2017-09-13 RX ADMIN — FUROSEMIDE 40 MG: 10 INJECTION, SOLUTION INTRAVENOUS at 21:12

## 2017-09-13 ASSESSMENT — ENCOUNTER SYMPTOMS
FREQUENCY: 0
SHORTNESS OF BREATH: 1
CHILLS: 0
ABDOMINAL PAIN: 0
SORE THROAT: 0
DYSURIA: 0
BLOOD IN STOOL: 0
FEVER: 0
COUGH: 1

## 2017-09-13 NOTE — PROGRESS NOTES
Patient being sent to the [facility] GERIATRIC SERVICES    Chief Complaint   Patient presents with     RECHECK       HPI:    Kathryn Banks is a 77 year old  (1940), who is being seen today for an episodic care visit at [facility].  HPI information obtained from: facility chart records, facility staff, patient report and [facility] chart review.    Today's concern is:     Chronic diastolic heart failure (H)  Essential hypertension with goal blood pressure less than 140/90  Chronic kidney disease, stage III (moderate)  Atrial fibrillation with rapid ventricular response (H)  Hypocalcemia  Decreased hearing of left ear  Venous ulcer  Metabolic acidosis  Dysuria  Type 2 diabetes mellitus with other circulatory complications (H)  Pleural effusion  Anemia of chronic renal failure, stage 4 (severe) (H)  Chronic diarrhea  Physical deconditioning  Hyperkalemia     Patient is met today in her room at [facility] T[facility].  She continues to have a productive cough that she notes is improving (but by exam seemingly is not and is perhaps worse).  She is requiring O2 now again and complains of SOB.  Labs have returned today and are showing worsening CKD and anemia, hypernatremia (see below). Therapy is reporting desaturation with minimal activity to 85% and O2 has been titrated to 2 LPM via NC.     She continues to report persistent (but improving) diarrhea.     She denies CP, heartburn, upset stomach, fever/chills, pain.      ALLERGIES: Ciprofloxacin; Oxycodone; Lisinopril; and Sulfa drugs  Past Medical, Surgical, Family and Social History reviewed and updated in [facility].    Current Outpatient Prescriptions   Medication Sig Dispense Refill     torsemide (DEMADEX) 20 MG tablet Take 40 mg by mouth daily And 20mg PO QD       loperamide (IMODIUM) 2 MG capsule Take 2 mg by mouth 3 times daily as needed for diarrhea       ferrous sulfate (IRON) 325 (65 FE) MG tablet Take 325 mg by mouth 2 times daily       Warfarin  Sodium (COUMADIN PO) Take by mouth daily Take as directed per INR results       fluticasone (FLONASE) 50 MCG/ACT spray Spray 1 spray into both nostrils daily 1 Bottle 0     guaiFENesin-dextromethorphan (ROBITUSSIN DM) 100-10 MG/5ML syrup Take 5 mLs by mouth every 8 hours as needed for cough or congestion 560 mL 0     calcitRIOL (ROCALTROL) 0.5 MCG capsule Take 1 capsule (0.5 mcg) by mouth daily       Calcium Carb-Cholecalciferol 500-400 MG-UNIT TABS Take 1 tablet by mouth 2 times daily       ipratropium - albuterol 0.5 mg/2.5 mg/3 mL (DUONEB) 0.5-2.5 (3) MG/3ML neb solution Take 1 vial by nebulization 3 times daily       ACETAMINOPHEN PO Take 650 mg by mouth every 4 hours as needed for pain For pain 1-5 out of 10       TRAMADOL HCL PO Take 50 mg by mouth every 6 hours as needed for moderate to severe pain Pain of 6-10 out of 10       cyanocobalamin (VITAMIN B12) 1000 MCG/ML injection Inject 1 mL into the muscle every 30 days       aspirin 81 MG tablet Take 1 tablet (81 mg) by mouth daily 30 tablet 3     sodium bicarbonate 650 MG tablet Take 2 tablets (1,300 mg) by mouth 3 times daily       nitroGLYcerin (NITROSTAT) 0.4 MG sublingual tablet Place 1 tablet (0.4 mg) under the tongue every 5 minutes as needed for chest pain 25 tablet 0     gabapentin (NEURONTIN) 300 MG capsule Take 1 capsule (300 mg) by mouth At Bedtime 90 capsule      pravastatin (PRAVACHOL) 40 MG tablet Take 1 tablet (40 mg) by mouth daily 90 tablet 3     metoprolol (LOPRESSOR) 50 MG tablet Take 0.5 tablets (25 mg) by mouth 2 times daily 180 tablet 3     amLODIPine (NORVASC) 5 MG tablet Take 2 tablets (10 mg) by mouth daily 30 tablet 3     calcium acetate (PHOSLO) 667 MG CAPS capsule Take 1 capsule (667 mg) by mouth 3 times daily (with meals) 180 capsule      pentoxifylline (TRENTAL) 400 MG CR tablet Take 1 tablet (400 mg) by mouth daily       Lactobacillus (ACIDOPHILUS) tablet Take 1 tablet by mouth daily       ORDER FOR DME Equipment being ordered:  cpap machine, mask, humidifier, tubing and filters 1 Device 0     Medications reviewed:  Medications reconciled to facility chart and changes were made to reflect current medications as identified as above med list. Below are the changes that were made:   Medications stopped since last EPIC medication reconciliation:    There are no discontinued medications.    Medications started since last [facility] medication reconciliation:  Torsemide was increased to 40 mg qAM and 20 mg q afternoon on 9/11/17.     REVIEW OF SYSTEMS:  10 point ROS of systems including Constitutional, Eyes, Respiratory, Cardiovascular, Gastroenterology, Genitourinary, Integumentary, Muscularskeletal, Psychiatric were all negative except for pertinent positives noted in my HPI.    Physical Exam:  /79  Pulse 88  Temp 97.8  F (36.6  C)  Resp 18  Wt 173 lb 6.4 oz (78.7 kg)  SpO2 92%  BMI 29.76 kg/m2  GENERAL APPEARANCE:  Alert, in no distress, malaiseful, deconditioned,   RESP:  respiratory effort and palpation of chest normal, auscultation of lungs clear on right side with diminished left LS with mild rub in lower left field, no overt respiratory distress, now on 2 LPM O2 via NC, productive cough seemingly worse than previous visit.   CV:  Palpation and auscultation of heart done , rate and rhythm regular, no murmur, mild to moderate LE peripheral edema (taut skin), PVD changes in LEs with venous status ulcers present, tubi- in place  ABDOMEN:  normal bowel sounds, soft, nontender, no hepatosplenomegaly or other masses  M/S:   Gait and station ambulatory with walker, in WC today, Digits and nails at baseline, reduced muscle mass  SKIN:  Inspection and Palpation of skin and subcutaneous tissue with overall paleness, LE skin taut.  PVD changes present.   PSYCH:  insight and judgement, memory intact, affect and mood normal, follows commands readily         Recent Labs:      9/13/17  BASIC METAB PROFILE  SODIUM 149 mmol/L 136-145 H  Final  POTASSIUM 3.9 mmol/L 3.5-5.1 Final  CHLORIDE 117 mmol/L  H Final  CARBON DIOXIDE 16 mmol/L 21-32 L Final  BUN (UREA NITRO) 60 mg/dL 7-24 H Final  CREATININE 4.36 mg/dL 0.55-1.02 H Final  EST GFR (MDRD) 10 mL/min >60 L Final  EST GFR IF AFRICAN AM 12 mL/min >60 L Final  GLUCOSE 86 mg/dL  Final  CALCIUM, SERUM 6.3 mg/dL 8.5-10.1 LL Final  Critical Result(s) Called at 10:40:48 09/13/2017 to and read back by Narda at [facility] home to .  ANION GAP 16.0 mmol/L 0.0-15.0 H Final    9/8/17  BASIC METAB PROFILE  SODIUM 146 mmol/L 136-145 H Final  POTASSIUM 4.9 mmol/L 3.5-5.1 Final  CHLORIDE 115 mmol/L  H Final  CARBON DIOXIDE 20 mmol/L 21-32 L Final  BUN (UREA NITRO) 40 mg/dL 7-24 H Final  CREATININE 4.79 mg/dL 0.55-1.02 H Final  EST GFR (MDRD) 9 mL/min >60 L Final  EST GFR IF AFRICAN AM 11 mL/min >60 L Final  GLUCOSE 49 mg/dL  LL Final  Critical Result(s) Called at 10:22:30 09/08/2017 to and read back by Rajinder damico  [facility]  CALCIUM, SERUM 6.4 mg/dL 8.5-10.1 LL Final  Critical Result(s) Called at 10:24:04 09/08/2017 to and read back by Rajinder [facility]  ANION GAP 11.0 mmol/L 0.0-15.0 Final  CBC RESULTS:   Recent Labs   Lab Test  09/06/17   0741  09/01/17   0637  08/31/17   0627   WBC   --   5.1  5.4   RBC   --   3.17*  3.02*   HGB  8.6*  8.2*  7.7*   HCT  28.8*  27.9*  26.0*   MCV   --   88  86   MCH   --   25.9*  25.5*   MCHC   --   29.4*  29.6*   RDW   --   19.6*  20.2*   PLT   --   265  256       Last Basic Metabolic Panel:  Recent Labs   Lab Test  09/06/17   0741  09/01/17   0637   NA  147*  145*   POTASSIUM  5.8*  3.5   CHLORIDE  114*  106   MALICK  6.6*  6.1*   CO2  26  31   BUN  36*  37*   CR  4.67*  4.78*   GLC  102*  101*       Liver Function Studies -   Recent Labs   Lab Test  09/06/17   0741  08/27/17   0836  08/27/17   0607  08/26/17   0637   08/12/17   1616   PROTTOTAL   --    --    --   5.2*   --   4.7*   ALBUMIN  2.0*   --   1.8*  1.8*   < >  1.9*   BILITOTAL    --   0.2   --   0.3   --   0.2   ALKPHOS   --    --    --   292*   --   216*   AST   --    --    --   34   --   32   ALT   --    --    --   80*   --   44    < > = values in this interval not displayed.       TSH   Date Value Ref Range Status   04/28/2017 0.75 0.40 - 4.00 mU/L Final   01/10/2017 1.03 0.40 - 4.00 mU/L Final   ]    Lab Results   Component Value Date    A1C 7.3 06/22/2017    A1C 6.2 03/31/2017       ASSESSMENT/PLAN:  Chronic diastolic heart failure (H)  Essential hypertension with goal blood pressure less than 140/90  Cardio-Renal status tenuous  Patient recently in hospital for CHF exacerbation, given IV Lasix 40 mg BID with improvement.    Now followed by [facility], Destiny Berrios NP     Currently on torsemide 40 mg qAM and  20 mg q afternoon (prev 20 mg BID, increased d/t fluid overload on 9/11/17).  Remains on amlodipine 10 mg daily, ASA 81 mg daily,  Metoprolol 25 mg BID, pravastatin 40 mg daily.     ProBNP 9/6/17 - 18,778  IV 40 mg Lasix at [facility] - 9/6/17   Metolazone 2.5 mg 9/7/17    Torsemide increased 9/11/17 to 40mg in AM, 20 mg in afternoon (from 20 mg BID)    BPs 140s/80s  HRs 80s     Patient denies CP, HA: occasional lightheadedness      Weights prior to hospitalization 180s --> now 160s, although on the rise:  9/4: 162.4  9/5: 165.4  9/6: 167.4 - extra lasix ([facility]) given  9/7: 166.3 - 2.5 mg metolazone given  9/8 - 171  9/9 - 171  9/10 - 170.2  9/11 - 172.8  9/12 - 173.4 - torsemide 40 mg qAM, 20 mg q afternoon  9/13 - 172.4    LS today with clear right LS, left LS diminished with mild rub in LLL.  Edema moderate in LEs.  Patient now on O2 2 LPM and has productive cough.   Labs showing worsening kidney function.  (see below)      Chronic kidney disease, stage III (moderate)  Metabolic acidosis  Previous hospitalization for worsening labs, no HD done at that time  F/u with Dr. Barfield, Nephrology on 8/22/17 and discussion regarding dialysis potential.    Since then patient  hospitalized again for CHF exacerbation and now [facility]   Patient on torsemide - recently increased for fluid overload    F/u appt with Dr. Barfield - 17.      On NaHCO3 1300 mg TID - last CO2 (17) - 20  17 - CO2 - 16  Per records - baseline CO2 is 21      17: BUN 36, Creat 4.67, GFR 9, Phos 4.9  17: BUN 40, Creat 4.79, GFR 9  17: BUN 60, Creat 4.36, GFR 10    Worsening kidney labs and also hypoxic, anemic.    Discussion regarding sending patient to the hospital for evaluation.  Patient reluctantly agreed.         Atrial fibrillation with rapid ventricular response (H)  F/b Dr. King, Cardiology (last visit 8/10/17)  S/p ablation 2017  Coumadin restarted after afib noted on telemetry during this last hospitalization.    Thought likely related to hypervolemia.  Converted to NSR by DC - on Metoprolol 25 mg BID.  Zio patch ordered for monitoring. - patient wearing today on left chest     HR regular today  INR today 4.3  Coumadin dosin/1 - 2.5  9/2 - 2.5  3 - 5mg   - 7.5 mg   - 7.5 mg   - 5 mg   - 2.5   - held   - 2 mg  9/10 - 4 mg   - held   - 2 mg  Plan would be to hol again but am sending patient to the hospital so they will evaluate her.          Hypocalcemia  Chronic from renal failure. Corrected calcium 7.5 during hospitalization.  Ca level (last) 6.4 (8.0 corrected)  Ca level today 6.3   Added Calcitriol 0.5 mcg last hospitalization  Remains on calcium acetate and calcium carbonate.    Also appreciate Nephrology recommendation.      Remains on Vit D 2000 units daily.      Hyperkalemia  Patient given IV Lasix at 17 clinic visit and 2.5 mg metolazone on 17 before torsemide dose  Also received kayexalate x 1 dose - 17  K 4.9 on last labs  K 17 - 3.9  Will monitor.     Decreased hearing of left ear  Nasal fluticasone started last hospitalization for clear fluid noted behind TM, likely 2/2 eustachion tube dysfucntion  Patient  noting improvement and now able to hear better  Stable.      Venous ulcer  venous stasis ulcers. Vascular Surgery consulted during previous hospitalization and noted ultimately no microvascular disease.    Wound Care and Dermatology followed in hospital.   Improvement in ulcers  Will monitor.   Continues pentoxifylline 400 gm daily       Dysuria  Cephalexin x 2 days to finish 10 day course - now complete  Patient denies symptoms  Will monitor.      Type 2 diabetes mellitus with other circulatory complications (H)        Lab Results   Component Value Date     A1C 7.3 06/22/2017     A1C 6.2 03/31/2017     A1C 6.6 07/03/2016     A1C 7.1 04/12/2016     A1C 6.0 06/08/2015   On no meds for DM2  On ASA, statin, no ACEI d/t CKD4     Will monitor.      Neuropathy in bilat heels (and RLS) - takes gabapentin 300 mg qHS        Pleural effusion  Likely 2/2 fluid overload  IV Lasix while in the hospital  40 mg IV Lasix 9/6/17 at clinic visit  Metolazone given 9/7/17 9/11 - torsemide icnreased  LS today cler on right but left side diminished and with potential mild rub in LLL  Productive cough (clear sputum per patient) present  Patient reports SOB  Now back on O2 (likely also 2/2 anemia)  Am sending patient to the hospital for evaluation.      Anemia of chronic renal failure, stage 4 (severe) (H)  Baseline Hgb 8-9  8/22/17 - Hgb 7.8 - Nephrologist consulted [facility] (Yesy Samaniego)  On Fe Supplements 325 mg BID         Hemoglobin   Date Value Ref Range Status   09/06/2017 8.6 (L) 11.7 - 15.7 g/dL Final   09/13/17 - Hgb 6.8  Patient now requiring O2 and desats with mildest activity.  Will send patient to the hospital as last hospitalization she needed transfusion.       Chronic diarrhea  Potential 2/2 antibiotics which are now complete.  c diff neg while in hospital  Patient noting it is slowly improving  Will add PRN imodium.   Will monitor.      Physical deconditioning  Multiple hospitalizations and TCU stays  Physical  Therapy/ Occupational Therapy for rehab      Progress report from therapy:  Petros 19  SLUMS 28  CPT 5.0  Ambulating 200 ft with 4WW - limited by fatigue, hypoxia  desats to 80s with mildest activity.   SBA for ADLs.       Orders:  OK to send patient to the [facility] ER.  Patient's  will bring her there, per patient's request.     Electronically signed by  RHODA Bowden CNP

## 2017-09-13 NOTE — LETTER
Transition Communication Hand-off for Care Transitions to Next Level of Care Provider    Name: Kathryn Banks  MRN #: [id number]  Primary Care Provider: Dheeraj Jj     Primary Clinic: [facility] [address] [place] [id number]     Reason for Hospitalization:  Dyspnea on exertion [R06.09]  Admit Date/Time: 9/13/2017  6:40 PM  Discharge Date: 9/15/17  Payor Source: Payor: BCBS / Plan: BCBS PLATINUM BLUE / Product Type: PPO /     Readmission Assessment Measure (ANDREW) Risk Score/category: high    Reason for Communication Hand-off Referral: Fragility  Multiple providers/specialties    Discharge Plan: home with home care and oxygen       Concern for non-adherence with plan of care:   Y/N no  Discharge Needs Assessment:  Needs       Most Recent Value    Equipment Currently Used at Home grab bar, walker, rolling    Transportation Available family or friend will provide          Already enrolled in Tele-monitoring program and name of program:  no  Follow-up specialty is recommended: Yes    Follow-up plan:  Future Appointments  Date Time Provider Department Center   9/19/2017 11:30 AM LAB FIRST FLOOR [facility]   9/19/2017 12:00 PM Vibha Barfield MD [facility]   9/21/2017 1:40 PM Jovi Thomas PA-C [facility] [place]   9/22/2017 7:30 AM  LAB [facility]   9/22/2017 8:00 AM Helen Plasencia APRN CNP [facility]       Any outstanding tests or procedures:    Procedures     Future Labs/Procedures    Oxygen Adult     Comments:    New Home Oxygen Order 2 liters by nasal cannula continuously with use of portable tank. Expected treatment length is 3 months.. Test on conserving device as applicable.  Patient is ambulatory within the home    Patients who qualify for home O2 coverage under the CMS guidelines require ABG tests or O2 sat readings obtained closest to, but no earlier than 2 days prior to the discharge, as evidence of the need for home oxygen therapy. Testing must  be performed while patient is in the chronic stable state. See notes for O2 sats.    I certify that this patient, Kathryn Banks has been under my care and that I, or a nurse practitioner or physician's assistant working with me, had a face-to-face encounter that meets the face-to-face encounter requirements with this patient on 9/15/2017. The patient, Kathryn Banks was evaluated or treated in whole, or in part, for the following medical condition, which necessitates the use of the ordered oxygen. Treatment Diagnosis: CHF, volume overload, pulmonary Htn    Attending Provider: Mike Irene MD  Physician signature: See electronic signature associated with these discharge orders  Date of Order: September 15, 2017          Referrals     Future Labs/Procedures    Home care nursing referral     Comments:    [facility]    RN skilled nursing visit. RN to assess vital signs and weight, respiratory and cardiac status, pain level and activity tolerance, hydration, nutrition and bowel status and home safety.  RN to provide lab draws and wound care.    Your provider has ordered home care nursing services. If you have not been contacted within 2 days of your discharge please call the inpatient department phone number at [phone] .            Zurita Recommendations:  See AVS    Sindy Rodriguez RN, BSN  Care Coordinator [facility] [address]  Pager: [phone]  Phone: [phone]    AVS/Discharge Summary is the source of truth; this is a helpful guide for improved communication of patient story

## 2017-09-13 NOTE — IP AVS SNAPSHOT
[address] Observation [address] [address]    Phone:  [phone]    Fax:  [phone]                                       After Visit Summary   9/13/2017    Kathryn Banks    MRN: [id number]           After Visit Summary Signature Page     I have received my discharge instructions, and my questions have been answered. I have discussed any challenges I see with this plan with the nurse or doctor.    ..........................................................................................................................................  Patient/Patient Representative Signature      ..........................................................................................................................................  Patient Representative Print Name and Relationship to Patient    ..................................................               ................................................  Date                                            Time    ..........................................................................................................................................  Reviewed by Signature/Title    ...................................................              ..............................................  Date                                                            Time

## 2017-09-13 NOTE — IP AVS SNAPSHOT
"                  MRN:[id number]                      After Visit Summary   9/13/2017    Kathryn Banks    MRN: [id number]           Thank you!     Thank you for choosing [facility] for your care. Our goal is always to provide you with excellent care. Hearing back from our patients is one way we can continue to improve our services. Please take a few minutes to complete the written survey that you may receive in the mail after you visit with us. Thank you!        Patient Information     Date Of Birth          1940        Designated Caregiver       Most Recent Value    Caregiver    Will someone help with your care after discharge? no      About your hospital stay     You were admitted on:  September 13, 2017 You last received care in the:  Unit 6D Observation [facility]    You were discharged on:  September 15, 2017        Reason for your hospital stay       Dear Kathryn Banks    Your were hospitalized at [facility] with shortness of breath and diarrhea.The shortness of breath was treated with IV diuretics. Your diarrhea is from an infection called Clostridium difficile or \"C. Diff\". We treat this with an antibiotic called flagyl. Over your hospitalization your breathing improved and today you are ready to be discharged home.  If you continue antibiotics and diuretics you should continue to improve but if you develop fever, shortness of breath, light headedness, or chest pain please seek medical attention.    We are suggesting the following medication changes:  Increase toresmide to 40mg orally twice daily  Start flagyl three times daily    Hold your warfarin tonight and tomorrow. Restart at 2mg on Sunday.     You have appointments set up with [facility] and PCP. You will also need an INR check and warfarin adjustments.    It was a pleasure meeting with you today. Thank you for allowing me and my team the privilege of caring for you today. You are the reason we are here, and I " truly hope we provided you with the excellent service you deserve. Please let us know if there is anything else we can do for you so that we can be sure you are leaving completely satisfied with your care experience.    Your hospital unit at the time of discharge is 6D so if you have any questions please call the hospital at [phone] and ask to talk to a nurse on 6D.    Take care!  Neema Marr MD  Department of Medicine  [facility]                  Who to Call     For medical emergencies, please call 559.  For non-urgent questions about your medical care, please call your primary care provider or clinic, [phone]          Attending Provider     Provider Specialty    Billy Agustin MD Emergency Medicine    Ashley Chapman MD Internal Medicine    Mike Irene MD HOSPITALIST       Primary Care Provider Office Phone # Fax #    Dheeraj Jj [phone]      After Care Instructions     Activity       Your activity upon discharge: activity as tolerated            Diet       Follow this diet upon discharge: Orders Placed This Encounter      2 Gram Sodium Diet                  Follow-up Appointments     Adult [facility] Follow-up and recommended labs and tests       Follow up with primary care provider, Dheeraj Jj, within 7 days for hospital follow- up.  The following labs/tests are recommended: BMP.     Follow-up with [facility] within 2 weeks.     Follow-up for INR check within 1-2 days.     Appointments on [place] and/or [facility] (with [facility] provider or service). Call [phone] if you haven't heard regarding these appointments within 7 days of discharge.                  Your next 10 appointments already scheduled     Sep 19, 2017 11:30 AM CDT   LAB with LAB FIRST FLOOR [facility] ([facility])    [address] [place] [id number] [phone]           Patient must bring picture ID.  Patient should be prepared to give a urine specimen  Please do not eat 10-12 hours before your appointment if you are coming in fasting for labs on lipids, cholesterol, or glucose (sugar). Pregnant women should follow their Care Team instructions. Water with medications is okay. Do not drink coffee or other fluids. If you have concerns about taking  your medications, please ask at office or if scheduling via [employer], send a message by clicking on Secure Messaging, Message Your Care Team.            Sep 19, 2017 12:00 PM CDT   Return Visit with Vibha Barfield MD   [facility]    [id number] [place] [id number] [phone]            Sep 21, 2017  1:40 PM CDT   [facility] Visit with Jovi Thomas PA-C   [facility]    [address] [facility] [address] [phone]           Bring a current list of meds and any records pertaining to this visit. For Physicals, please bring immunization records and any forms needing to be filled out. Please arrive 10 minutes early to complete paperwork.            Sep 22, 2017  7:30 AM CDT   Lab with  LAB    [facility] ([facility])    [address] [place] [id number] [phone]            Sep 22, 2017  8:00 AM CDT   (Arrive by 7:45 AM)   CORE RETURN with RHODA Liu CNP   [facility] ([facility])    [address] [place] [id number] [phone]              Additional Services     Home care nursing referral       [facility]    RN skilled nursing visit. RN to assess vital signs and weight, respiratory and cardiac status, pain level and activity tolerance, hydration, nutrition and bowel status and home safety.  RN to provide lab draws and wound care.    Your provider has ordered home care nursing services. If you have not been  "contacted within 2 days of your discharge please call the inpatient department phone number at [phone] .                  Further instructions from your care team       _______________________  [facility]  Phone  [phone]  Fax  [phone]  ______________________        Pending Results     No orders found from 2017 to 2017.            Statement of Approval     Ordered          09/15/17 1401  I have reviewed and agree with all the recommendations and orders detailed in this document.  EFFECTIVE NOW     Approved and electronically signed by:  Mike Irene MD             Admission Information     Date & Time Provider Department Dept. Phone    2017 Mike Irene MD Unit 6D Observation [facility] Post [phone]      Your Vitals Were     Blood Pressure Pulse Temperature Respirations Height Weight    119/59 86 98.7  F (37.1  C) (Oral) 16 1.626 m (5' 4\") 76.1 kg (167 lb 11.2 oz)    Pulse Oximetry BMI (Body Mass Index)                92% 28.79 kg/m2          MyChart Information     [employer] lets you send messages to your doctor, view your test results, renew your prescriptions, schedule appointments and more. To sign up, go to [email or web address] . Click on \"Log in\" on the left side of the screen, which will take you to the Welcome page. Then click on \"Sign up Now\" on the right side of the page.     You will be asked to enter the access code listed below, as well as some personal information. Please follow the directions to create your username and password.     Your access code is: [email or web address]  Expires: 2017  4:04 PM     Your access code will  in 90 days. If you need help or a new code, please call your [place] clinic or [phone].        Care EveryWhere ID     This is your Care EveryWhere ID. This could be used by other organizations to access your [place] medical records  [id number]        Equal Access to Services     CAROLINE SEGURA: Tl cameron " Desireedory, huberda luqadaha, qaybta kaalreza downey, tay stovallangel lidia. Desiree [facility] [phone].    ATENCIÓN: Si arturo garcia, tiene a medrano disposición servicios gratuitos de asistencia lingüística. Vicki al [phone].    We comply with applicable federal civil rights laws and [place] laws. We do not discriminate on the basis of race, color, national origin, age, disability sex, sexual orientation or gender identity.               Review of your medicines      START taking        Dose / Directions    metroNIDAZOLE 500 MG tablet   Commonly known as:  FLAGYL   Indication:  Clostridium difficile Bacteria   Used for:  Colitis due to Clostridium difficile        Dose:  500 mg   Take 1 tablet (500 mg) by mouth every 8 hours   Quantity:  42 tablet   Refills:  0       pramipexole 0.125 MG tablet   Commonly known as:  MIRAPEX   Used for:  Restless leg syndrome        Dose:  0.125 mg   Take 1 tablet (0.125 mg) by mouth 3 times daily   Quantity:  90 tablet   Refills:  3         CONTINUE these medicines which may have CHANGED, or have new prescriptions. If we are uncertain of the size of tablets/capsules you have at home, strength may be listed as something that might have changed.        Dose / Directions    torsemide 20 MG tablet   Commonly known as:  DEMADEX   This may have changed:  when to take this   Used for:  Chronic diastolic congestive heart failure (H)        Dose:  40 mg   Take 2 tablets (40 mg) by mouth 2 times daily And 20mg PO QD   Quantity:  30 tablet   Refills:  3         CONTINUE these medicines which have NOT CHANGED        Dose / Directions    ACETAMINOPHEN PO        Dose:  650 mg   Take 650 mg by mouth every 4 hours as needed for pain For pain 1-5 out of 10   Refills:  0       acidophilus tablet   Used for:  Urinary tract infection without hematuria, site unspecified, Cellulitis of lower extremity, unspecified laterality        Dose:  1 tablet   Take 1 tablet by mouth daily    Refills:  0       amLODIPine 5 MG tablet   Commonly known as:  NORVASC   Used for:  Benign essential hypertension        Dose:  10 mg   Take 2 tablets (10 mg) by mouth daily   Quantity:  30 tablet   Refills:  3       aspirin 81 MG tablet   Used for:  Coronary artery disease involving native coronary artery of native heart without angina pectoris        Dose:  81 mg   Take 1 tablet (81 mg) by mouth daily   Quantity:  30 tablet   Refills:  3       calcitRIOL 0.5 MCG capsule   Commonly known as:  ROCALTROL   Used for:  Constitutional chronic hypocalcemia        Dose:  0.5 mcg   Take 1 capsule (0.5 mcg) by mouth daily   Refills:  0       calcium acetate 667 MG Caps capsule   Commonly known as:  PHOSLO   Used for:  Chronic kidney disease, unspecified CKD stage        Dose:  667 mg   Take 1 capsule (667 mg) by mouth 3 times daily (with meals)   Quantity:  180 capsule   Refills:  0       Calcium Carb-Cholecalciferol 500-400 MG-UNIT Tabs        Dose:  1 tablet   Take 1 tablet by mouth 2 times daily   Refills:  0       COUMADIN PO        Take by mouth daily Take as directed per INR results   Refills:  0       cyanocobalamin 1000 MCG/ML injection   Commonly known as:  VITAMIN B12        Dose:  1 mL   Inject 1 mL into the muscle every 30 days   Refills:  0       ferrous sulfate 325 (65 FE) MG tablet   Commonly known as:  IRON        Dose:  325 mg   Take 325 mg by mouth 2 times daily   Refills:  0       fluticasone 50 MCG/ACT spray   Commonly known as:  FLONASE   Used for:  Nasal congestion        Dose:  1 spray   Spray 1 spray into both nostrils daily   Quantity:  1 Bottle   Refills:  0       gabapentin 300 MG capsule   Commonly known as:  NEURONTIN   Used for:  Diabetic polyneuropathy associated with type 2 diabetes mellitus (H)        Dose:  300 mg   Take 1 capsule (300 mg) by mouth At Bedtime   Quantity:  90 capsule   Refills:  0       guaiFENesin-dextromethorphan 100-10 MG/5ML syrup   Commonly known as:  ROBITUSSIN DM    Used for:  Cough        Dose:  5 mL   Take 5 mLs by mouth every 8 hours as needed for cough or congestion   Quantity:  560 mL   Refills:  0       ipratropium - albuterol 0.5 mg/2.5 mg/3 mL 0.5-2.5 (3) MG/3ML neb solution   Commonly known as:  DUONEB        Dose:  1 vial   Take 1 vial by nebulization 3 times daily   Refills:  0       metoprolol 50 MG tablet   Commonly known as:  LOPRESSOR   Used for:  Atrial fibrillation with rapid ventricular response (H)        Dose:  25 mg   Take 0.5 tablets (25 mg) by mouth 2 times daily   Quantity:  180 tablet   Refills:  3       nitroGLYcerin 0.4 MG sublingual tablet   Commonly known as:  NITROSTAT   Used for:  Hx of non-ST elevation myocardial infarction (NSTEMI), Atherosclerosis of native coronary artery of native heart without angina pectoris, Postsurgical aortocoronary bypass status        Dose:  0.4 mg   Place 1 tablet (0.4 mg) under the tongue every 5 minutes as needed for chest pain   Quantity:  25 tablet   Refills:  0       order for DME   Used for:  ANABEL (obstructive sleep apnea)        Equipment being ordered: cpap machine, mask, humidifier, tubing and filters   Quantity:  1 Device   Refills:  0       pentoxifylline 400 MG CR tablet   Commonly known as:  TRENtal   Used for:  Venous stasis ulcer (H)        Dose:  400 mg   Take 1 tablet (400 mg) by mouth daily   Refills:  0       pravastatin 40 MG tablet   Commonly known as:  PRAVACHOL   Used for:  Hx of non-ST elevation myocardial infarction (NSTEMI), Atherosclerosis of native coronary artery of native heart without angina pectoris, Type 2 diabetes mellitus with other circulatory complications (H)        Dose:  40 mg   Take 1 tablet (40 mg) by mouth daily   Quantity:  90 tablet   Refills:  3       sodium bicarbonate 650 MG tablet   Used for:  Chronic kidney disease, unspecified CKD stage        Dose:  1300 mg   Take 2 tablets (1,300 mg) by mouth 3 times daily   Refills:  0       TRAMADOL HCL PO        Dose:  50 mg    Take 50 mg by mouth every 6 hours as needed for moderate to severe pain Pain of 6-10 out of 10   Refills:  0         STOP taking     loperamide 2 MG capsule   Commonly known as:  IMODIUM                Where to get your medicines      These medications were sent to [facility] [address] [place] [address] - [address]  [address], [facility] [address]     Phone:  [phone]     metroNIDAZOLE 500 MG tablet    pramipexole 0.125 MG tablet    torsemide 20 MG tablet                Protect others around you: Learn how to safely use, store and throw away your medicines at www.disposemymeds.org.             Medication List: This is a list of all your medications and when to take them. Check marks below indicate your daily home schedule. Keep this list as a reference.      Medications           Morning Afternoon Evening Bedtime As Needed    ACETAMINOPHEN PO   Take 650 mg by mouth every 4 hours as needed for pain For pain 1-5 out of 10                                acidophilus tablet   Take 1 tablet by mouth daily                                amLODIPine 5 MG tablet   Commonly known as:  NORVASC   Take 2 tablets (10 mg) by mouth daily   Last time this was given:  10 mg on 9/15/2017  7:40 AM                                aspirin 81 MG tablet   Take 1 tablet (81 mg) by mouth daily                                calcitRIOL 0.5 MCG capsule   Commonly known as:  ROCALTROL   Take 1 capsule (0.5 mcg) by mouth daily   Last time this was given:  0.5 mcg on 9/15/2017  7:40 AM                                calcium acetate 667 MG Caps capsule   Commonly known as:  PHOSLO   Take 1 capsule (667 mg) by mouth 3 times daily (with meals)   Last time this was given:  667 mg on 9/15/2017 12:30 PM                                Calcium Carb-Cholecalciferol 500-400 MG-UNIT Tabs   Take 1 tablet by mouth 2 times daily                                COUMADIN PO   Take by mouth daily Take as directed per INR results   Last time this was  given:  1.5 mg on 9/14/2017  5:38 PM                                cyanocobalamin 1000 MCG/ML injection   Commonly known as:  VITAMIN B12   Inject 1 mL into the muscle every 30 days   Last time this was given:  1,000 mcg on 9/14/2017  1:54 AM                                ferrous sulfate 325 (65 FE) MG tablet   Commonly known as:  IRON   Take 325 mg by mouth 2 times daily   Last time this was given:  325 mg on 9/15/2017  7:41 AM                                fluticasone 50 MCG/ACT spray   Commonly known as:  FLONASE   Spray 1 spray into both nostrils daily   Last time this was given:  1 spray on 9/15/2017  7:41 AM                                gabapentin 300 MG capsule   Commonly known as:  NEURONTIN   Take 1 capsule (300 mg) by mouth At Bedtime                                guaiFENesin-dextromethorphan 100-10 MG/5ML syrup   Commonly known as:  ROBITUSSIN DM   Take 5 mLs by mouth every 8 hours as needed for cough or congestion                                ipratropium - albuterol 0.5 mg/2.5 mg/3 mL 0.5-2.5 (3) MG/3ML neb solution   Commonly known as:  DUONEB   Take 1 vial by nebulization 3 times daily   Last time this was given:  3 mLs on 9/14/2017  7:29 PM                                metoprolol 50 MG tablet   Commonly known as:  LOPRESSOR   Take 0.5 tablets (25 mg) by mouth 2 times daily   Last time this was given:  25 mg on 9/15/2017  7:40 AM                                metroNIDAZOLE 500 MG tablet   Commonly known as:  FLAGYL   Take 1 tablet (500 mg) by mouth every 8 hours   Last time this was given:  500 mg on 9/15/2017  7:40 AM                                nitroGLYcerin 0.4 MG sublingual tablet   Commonly known as:  NITROSTAT   Place 1 tablet (0.4 mg) under the tongue every 5 minutes as needed for chest pain                                order for DME   Equipment being ordered: cpap machine, mask, humidifier, tubing and filters                                pentoxifylline 400 MG CR tablet    Commonly known as:  TRENtal   Take 1 tablet (400 mg) by mouth daily   Last time this was given:  400 mg on 9/15/2017  7:46 AM                                pramipexole 0.125 MG tablet   Commonly known as:  MIRAPEX   Take 1 tablet (0.125 mg) by mouth 3 times daily   Last time this was given:  0.125 mg on 9/15/2017  7:39 AM                                pravastatin 40 MG tablet   Commonly known as:  PRAVACHOL   Take 1 tablet (40 mg) by mouth daily   Last time this was given:  40 mg on 9/15/2017  7:40 AM                                sodium bicarbonate 650 MG tablet   Take 2 tablets (1,300 mg) by mouth 3 times daily   Last time this was given:  1,300 mg on 9/15/2017  7:40 AM                                torsemide 20 MG tablet   Commonly known as:  DEMADEX   Take 2 tablets (40 mg) by mouth 2 times daily And 20mg PO QD                                TRAMADOL HCL PO   Take 50 mg by mouth every 6 hours as needed for moderate to severe pain Pain of 6-10 out of 10

## 2017-09-13 NOTE — ED NOTES
Upon arriving patient into room 6, her oxygen sat is 81% with a good waveform. Patient feels slightly SOB but is able to answer questions; good color and mentation. Oxygen, 2 liters, placed by NC. Return of normal sats.

## 2017-09-14 ENCOUNTER — APPOINTMENT (OUTPATIENT)
Dept: PHYSICAL THERAPY | Facility: CLINIC | Age: 77
End: 2017-09-14
Payer: MEDICARE

## 2017-09-14 LAB
ANION GAP SERPL CALCULATED.3IONS-SCNC: 13 MMOL/L (ref 3–14)
BUN SERPL-MCNC: 60 MG/DL (ref 7–30)
C DIFF TOX B STL QL: POSITIVE
CALCIUM SERPL-MCNC: 6.8 MG/DL (ref 8.5–10.1)
CHLORIDE SERPL-SCNC: 113 MMOL/L (ref 94–109)
CO2 SERPL-SCNC: 19 MMOL/L (ref 20–32)
CREAT SERPL-MCNC: 4.49 MG/DL (ref 0.52–1.04)
ERYTHROCYTE [DISTWIDTH] IN BLOOD BY AUTOMATED COUNT: 17.7 % (ref 10–15)
GFR SERPL CREATININE-BSD FRML MDRD: 10 ML/MIN/1.7M2
GLUCOSE BLDC GLUCOMTR-MCNC: 112 MG/DL (ref 70–99)
GLUCOSE SERPL-MCNC: 125 MG/DL (ref 70–99)
HCT VFR BLD AUTO: 26.6 % (ref 35–47)
HGB BLD-MCNC: 8 G/DL (ref 11.7–15.7)
MCH RBC QN AUTO: 25.6 PG (ref 26.5–33)
MCHC RBC AUTO-ENTMCNC: 30.1 G/DL (ref 31.5–36.5)
MCV RBC AUTO: 85 FL (ref 78–100)
PLATELET # BLD AUTO: 299 10E9/L (ref 150–450)
POTASSIUM SERPL-SCNC: 3.8 MMOL/L (ref 3.4–5.3)
RBC # BLD AUTO: 3.12 10E12/L (ref 3.8–5.2)
SODIUM SERPL-SCNC: 145 MMOL/L (ref 133–144)
SPECIMEN SOURCE: ABNORMAL
WBC # BLD AUTO: 7.8 10E9/L (ref 4–11)

## 2017-09-14 PROCEDURE — S0169 CALCITROL: HCPCS | Mod: GY | Performed by: STUDENT IN AN ORGANIZED HEALTH CARE EDUCATION/TRAINING PROGRAM

## 2017-09-14 PROCEDURE — A9270 NON-COVERED ITEM OR SERVICE: HCPCS | Mod: GY | Performed by: STUDENT IN AN ORGANIZED HEALTH CARE EDUCATION/TRAINING PROGRAM

## 2017-09-14 PROCEDURE — A9270 NON-COVERED ITEM OR SERVICE: HCPCS | Mod: GY | Performed by: INTERNAL MEDICINE

## 2017-09-14 PROCEDURE — 40000193 ZZH STATISTIC PT WARD VISIT: Performed by: PHYSICAL THERAPIST

## 2017-09-14 PROCEDURE — 94640 AIRWAY INHALATION TREATMENT: CPT

## 2017-09-14 PROCEDURE — 36415 COLL VENOUS BLD VENIPUNCTURE: CPT | Performed by: HOSPITALIST

## 2017-09-14 PROCEDURE — 25000132 ZZH RX MED GY IP 250 OP 250 PS 637: Performed by: HOSPITALIST

## 2017-09-14 PROCEDURE — 40000275 ZZH STATISTIC RCP TIME EA 10 MIN

## 2017-09-14 PROCEDURE — 00000146 ZZHCL STATISTIC GLUCOSE BY METER IP

## 2017-09-14 PROCEDURE — 99212 OFFICE O/P EST SF 10 MIN: CPT | Mod: 25

## 2017-09-14 PROCEDURE — 25000128 H RX IP 250 OP 636: Performed by: STUDENT IN AN ORGANIZED HEALTH CARE EDUCATION/TRAINING PROGRAM

## 2017-09-14 PROCEDURE — 25000132 ZZH RX MED GY IP 250 OP 250 PS 637: Mod: GY

## 2017-09-14 PROCEDURE — 80048 BASIC METABOLIC PNL TOTAL CA: CPT | Performed by: HOSPITALIST

## 2017-09-14 PROCEDURE — A9270 NON-COVERED ITEM OR SERVICE: HCPCS | Performed by: HOSPITALIST

## 2017-09-14 PROCEDURE — A9270 NON-COVERED ITEM OR SERVICE: HCPCS | Mod: GY

## 2017-09-14 PROCEDURE — 25000132 ZZH RX MED GY IP 250 OP 250 PS 637: Mod: GY | Performed by: INTERNAL MEDICINE

## 2017-09-14 PROCEDURE — 40000141 ZZH STATISTIC PERIPHERAL IV START W/O US GUIDANCE

## 2017-09-14 PROCEDURE — 96376 TX/PRO/DX INJ SAME DRUG ADON: CPT

## 2017-09-14 PROCEDURE — G0378 HOSPITAL OBSERVATION PER HR: HCPCS

## 2017-09-14 PROCEDURE — 85027 COMPLETE CBC AUTOMATED: CPT | Performed by: HOSPITALIST

## 2017-09-14 PROCEDURE — 25000128 H RX IP 250 OP 636: Performed by: INTERNAL MEDICINE

## 2017-09-14 PROCEDURE — 87493 C DIFF AMPLIFIED PROBE: CPT | Performed by: HOSPITALIST

## 2017-09-14 PROCEDURE — 97162 PT EVAL MOD COMPLEX 30 MIN: CPT | Mod: GP | Performed by: PHYSICAL THERAPIST

## 2017-09-14 PROCEDURE — 25000132 ZZH RX MED GY IP 250 OP 250 PS 637: Performed by: STUDENT IN AN ORGANIZED HEALTH CARE EDUCATION/TRAINING PROGRAM

## 2017-09-14 PROCEDURE — 99226 ZZC SUBSEQUENT OBSERVATION CARE,LEVEL III: CPT | Mod: GC | Performed by: HOSPITALIST

## 2017-09-14 PROCEDURE — 96372 THER/PROPH/DIAG INJ SC/IM: CPT

## 2017-09-14 PROCEDURE — 25000125 ZZHC RX 250: Performed by: STUDENT IN AN ORGANIZED HEALTH CARE EDUCATION/TRAINING PROGRAM

## 2017-09-14 RX ORDER — FUROSEMIDE 10 MG/ML
40 INJECTION INTRAMUSCULAR; INTRAVENOUS ONCE
Status: COMPLETED | OUTPATIENT
Start: 2017-09-14 | End: 2017-09-14

## 2017-09-14 RX ORDER — PRAMIPEXOLE DIHYDROCHLORIDE 0.12 MG/1
0.12 TABLET ORAL 3 TIMES DAILY
Status: DISCONTINUED | OUTPATIENT
Start: 2017-09-14 | End: 2017-09-15 | Stop reason: HOSPADM

## 2017-09-14 RX ORDER — IPRATROPIUM BROMIDE AND ALBUTEROL SULFATE 2.5; .5 MG/3ML; MG/3ML
1 SOLUTION RESPIRATORY (INHALATION) EVERY 4 HOURS PRN
Status: DISCONTINUED | OUTPATIENT
Start: 2017-09-14 | End: 2017-09-15 | Stop reason: HOSPADM

## 2017-09-14 RX ORDER — METRONIDAZOLE 500 MG/1
500 TABLET ORAL EVERY 8 HOURS SCHEDULED
Status: DISCONTINUED | OUTPATIENT
Start: 2017-09-14 | End: 2017-09-15 | Stop reason: HOSPADM

## 2017-09-14 RX ORDER — GABAPENTIN 300 MG/1
300 CAPSULE ORAL AT BEDTIME
Status: DISCONTINUED | OUTPATIENT
Start: 2017-09-15 | End: 2017-09-15 | Stop reason: HOSPADM

## 2017-09-14 RX ADMIN — FUROSEMIDE 40 MG: 10 INJECTION, SOLUTION INTRAVENOUS at 04:43

## 2017-09-14 RX ADMIN — WARFARIN SODIUM 1.5 MG: 3 TABLET ORAL at 17:38

## 2017-09-14 RX ADMIN — FUROSEMIDE 40 MG: 10 INJECTION, SOLUTION INTRAVENOUS at 18:20

## 2017-09-14 RX ADMIN — ASPIRIN 81 MG CHEWABLE TABLET 81 MG: 81 TABLET CHEWABLE at 08:31

## 2017-09-14 RX ADMIN — CALCIUM ACETATE 667 MG: 667 CAPSULE ORAL at 14:25

## 2017-09-14 RX ADMIN — METRONIDAZOLE 500 MG: 500 TABLET ORAL at 22:24

## 2017-09-14 RX ADMIN — FERROUS SULFATE TAB 325 MG (65 MG ELEMENTAL FE) 325 MG: 325 (65 FE) TAB at 08:31

## 2017-09-14 RX ADMIN — PRAVASTATIN SODIUM 40 MG: 40 TABLET ORAL at 08:32

## 2017-09-14 RX ADMIN — SODIUM BICARBONATE 650 MG TABLET 1300 MG: at 14:25

## 2017-09-14 RX ADMIN — CALCIUM ACETATE 667 MG: 667 CAPSULE ORAL at 17:38

## 2017-09-14 RX ADMIN — METOPROLOL TARTRATE 25 MG: 25 TABLET, FILM COATED ORAL at 20:55

## 2017-09-14 RX ADMIN — AMLODIPINE BESYLATE 10 MG: 10 TABLET ORAL at 08:32

## 2017-09-14 RX ADMIN — SODIUM BICARBONATE 650 MG TABLET 1300 MG: at 08:31

## 2017-09-14 RX ADMIN — METOPROLOL TARTRATE 25 MG: 25 TABLET, FILM COATED ORAL at 08:31

## 2017-09-14 RX ADMIN — Medication 1 TABLET: at 01:53

## 2017-09-14 RX ADMIN — IPRATROPIUM BROMIDE AND ALBUTEROL SULFATE 3 ML: .5; 3 SOLUTION RESPIRATORY (INHALATION) at 19:29

## 2017-09-14 RX ADMIN — PRAMIPEXOLE DIHYDROCHLORIDE 0.12 MG: 0.12 TABLET ORAL at 20:55

## 2017-09-14 RX ADMIN — FERROUS SULFATE TAB 325 MG (65 MG ELEMENTAL FE) 325 MG: 325 (65 FE) TAB at 20:55

## 2017-09-14 RX ADMIN — FLUTICASONE PROPIONATE 1 SPRAY: 50 SPRAY, METERED NASAL at 08:32

## 2017-09-14 RX ADMIN — PRAMIPEXOLE DIHYDROCHLORIDE 0.12 MG: 0.12 TABLET ORAL at 17:38

## 2017-09-14 RX ADMIN — CALCIUM ACETATE 667 MG: 667 CAPSULE ORAL at 08:32

## 2017-09-14 RX ADMIN — METRONIDAZOLE 500 MG: 500 TABLET ORAL at 17:39

## 2017-09-14 RX ADMIN — SODIUM BICARBONATE 650 MG TABLET 1300 MG: at 20:54

## 2017-09-14 RX ADMIN — FERROUS SULFATE TAB 325 MG (65 MG ELEMENTAL FE) 325 MG: 325 (65 FE) TAB at 01:54

## 2017-09-14 RX ADMIN — Medication 1 TABLET: at 08:31

## 2017-09-14 RX ADMIN — CYANOCOBALAMIN 1000 MCG: 1000 INJECTION, SOLUTION INTRAMUSCULAR; SUBCUTANEOUS at 01:54

## 2017-09-14 RX ADMIN — METOPROLOL TARTRATE 25 MG: 25 TABLET, FILM COATED ORAL at 01:53

## 2017-09-14 RX ADMIN — Medication 1 TABLET: at 20:54

## 2017-09-14 RX ADMIN — PENTOXIFYLLINE 400 MG: 400 TABLET, FILM COATED, EXTENDED RELEASE ORAL at 08:32

## 2017-09-14 RX ADMIN — CALCITRIOL 0.5 MCG: 0.5 CAPSULE, LIQUID FILLED ORAL at 08:31

## 2017-09-14 RX ADMIN — WARFARIN SODIUM 1.5 MG: 3 TABLET ORAL at 01:54

## 2017-09-14 NOTE — PLAN OF CARE
Problem: Discharge Planning  Goal: Discharge Planning (Adult, OB, Behavioral, Peds)  -Diagnostic tests and consults completed and resulted: No   -Vital signs normal or at patient baseline: Yes   -Dyspnea improved and O2 sats greater than 88% on room air or prior home oxygen levels: Yes

## 2017-09-14 NOTE — PROGRESS NOTES
Care Coordinator Progress Note     Admission Date/Time:  9/13/2017  Attending MD:  Mike Irene MD     Data  Chart reviewed, discussed with interdisciplinary team.   Patient was admitted for: Dyspnea on exertion.    Concerns with insurance coverage for discharge needs: None.  Current Living Situation: Patient lives with spouse and was [facility] [place] [facility].  Support System: Supportive and Involved  Services Involved: Home Care  Transportation: Family or Friend will provide  Barriers to Discharge: chronically ill    Coordination of Care and Referrals: Provided patient/family with options for Home Care.        Assessment  Spoke with patient at bedside.  She was recently discharged to [facility] [place] [facility].  Patient wishes to discharge home and states she was going to be discharging next week from the TCU back home.  Patient had been open with [facility] for skilled nursing and wishes to continue.  Therapy saw patient and recommend home safety evaluation and continued use of her walker.  Resumption orders placed.  Patient's  will provide transportation home.  Patient currently on oxygen.  Will continue to follow for needs.    _______________________  [facility]  Phone  [phone]  Fax  [phone]  ______________________       Plan  Anticipated Discharge Date:  tomorrow  Anticipated Discharge Plan:  Home with [facility]    Sindy Rodriguez RN, BSN  Care Coordinator [facility] [address]  Pager: [phone]  Phone: [phone]

## 2017-09-14 NOTE — PROGRESS NOTES
SW consulted to assist w/ return back to [facility]. Pt doesn't want to return and wants to go home. PT rec for home w/ [facility]. RNCC to follow.

## 2017-09-14 NOTE — PLAN OF CARE
Problem: Goal Outcome Summary  Goal: Goal Outcome Summary  PT 6D: Evaluation completed for safe discharge recommendation. Pt completes sit<>stand transfers and mobility with SBA and fww. Ambulating ~200 ft with fww and SBA. Mild balance impairments d/t leg length discrepancy, but no overt loss of balance. Pt planning to obtain shoe lift upon discharge. Reports btwn family and friends, someone is always home and able to assist as needed. Was planning to discharge from TCU on Tuesday and motivated to return home. Tolerates activity well.      Recommend discharge to home with assist as needed and  PT to progress strengthening and aerobic endurance.      ** Pt currently on 4L of O2 via NC. SpO2 94% on 4L O2 with activity. Does NOT use O2 at home. **

## 2017-09-14 NOTE — PROGRESS NOTES
[facility] Nurse Inpatient Adult WoundAssessment    Initial Assessment  Reason for consultation: Evaluate and treat bilateral lower extremity wounds    Assessment:   Bilateral lower leg wounds due to Venous Ulcer    Status: initial assessment, Improving    Photo:       17 bilateral lower legs                               17 left lateral        17 right lateral lower leg      TREATMENT  PLAN    Bilateral lower leg wounds: Wash every other day with wound cleanser and gauze. Cover open areas with Medihoney followed by Adaptic. Moisturize surrounding skin with Sween 24 and wrap with Kerlix. Patient to wear Tubigrip for compression- OK to remove at night.   Orders Written  WO Nurse follow-up plan:weekly  Nursing to notify the Provider(s) and re-consult the WO Nurse if wound(s) deteriorates or new skin concern.    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     Objective Data   Containment of urine/stool: Continent of bowel and Continent of bladder    Current Diet/ Nutrition:    Active Diet Order      2 Gram Sodium Diet    Output:   I/O last 3 completed shifts:  In: 720 [P.O.:720]  Out: 1800 [Urine:1800]    Skin Assessment: Allan Allan Score  Av.9  Min: 17  Max: 20                                Allan Q No Data Recorded                     NSRAS No Data Recorded     Labs:   Recent Labs  Lab 17  1028 17  1849   HGB 8.0* 8.6*   INR  --  3.18*   WBC 7.8 9.2         No lab results found in last 7 days.    Physical Exam    Skin assessment:   Focused skin inspection: bilateral lower legs    Wound Location:  Bilateral lower  legs  Wound History: Present for at least 2 months, WOC has followed in the past. Wounds are improving when compared with previous photos.   Measurements (length x width x depth, in cm) multiple wounds, all approximately 2-4 cm round with minimal depth   Wound Base:  Dry thin scabs  Tunneling N/A  Undermining N/A  Palpation of the wound bed: normal   Periwound skin: edematous, firm  Color: kailash  Temperature: normal   Drainage:, scant  Description of drainage: serous  Odor: none  Pain: denies     Interventions  Current support surface: Standard  Atmos Air    Current off-loading measures: n/a  Visual inspection of wound(s) completed  Wound Care:was done per plan of care    Cleansing with wound cleanser solution  Supplies: Reviewed  Education provided today: wound care    Discussed plan of care with Patient and Nurse    Face to face time: 20 minutes

## 2017-09-14 NOTE — ED PROVIDER NOTES
[place] EMERGENCY DEPARTMENT (Formerly [facility])  9/13/17   History     Chief Complaint   Patient presents with     Abnormal Labs     HPI  Kathryn Banks is a 77 year old female with a medical history of anemia, CHF, MARTÍN, acute on chronic renal failure, chronic atrial fibrillation, hypertension, type 2 diabetes mellitus and CAD who presents to the Emergency Departmentfor evaluation of abnormal labs. Patient reports having a hemoglobin of 6.8 and her nurse practitioner was concerned about the number and advised her to come to the Emergency Department. Of note, patient's normal hemoglobin is usually around 8. Patient also complains of worsening shortness of breath over the past week and a cough with increased sputum over the past 1-2 weeks. She denies any chest pain, fever, sore throat, any new leg swelling, pain or blood in her stool. She does note having some congestion. Patient is on 2 L of home oxygen. Patient currently lives in a TCU facility. Patient is on warfarin and her INR today was 4.3. Patient denies ever having these symptoms in the past. She notes she has been retaining fluid recently and wears compression stockings. She also notes having blisters on her legs.     I have reviewed the Medications, Allergies, Past Medical and Surgical History, and Social History in the [employer] system.    Past Medical History:   Diagnosis Date     Carpal tunnel syndrome      Coronary atherosclerosis of native coronary artery 2006    5 vessel CABG     OBSTRUCTIVE SLEEP APNEA     using CPAP     Osteoarthrosis, unspecified whether generalized or localized, unspecified site     generalized arthritis, particularly in her hands and feet         Other and combined forms of senile cataract 2000    Bilateral     Other and unspecified hyperlipidemia      RESTLESS LEGS SYNDROME      TENOSYNOV HAND/WRIST NEC 6/30/2006     Type II or unspecified type diabetes mellitus without mention of complication, not stated as uncontrolled 2001     Diabetes mellitus/pt is diet controlled with weight loss     Typical atrial flutter (H) 2007    ablation 2017     Unspecified essential hypertension        Past Surgical History:   Procedure Laterality Date     ANGIOPLASTY       C APPENDECTOMY       C CABG, VEIN, FIVE      SVG x 4 and LIMA to LAD     C SOTO W/O FACETEC FORAMOT/DSKC  VRT SEG, LUMBAR  1968     C REMV CATARACT INTRACAP,INSERT LENS      Bilateral     C TOTAL ABDOM HYSTERECTOMY       - fibroids     C VAGOTOMY/PYLOROPLASTY,SELECT  1970     COLONOSCOPY  12/3/2007     COLONOSCOPY  2014    Procedure: COLONOSCOPY;  Colonoscopy;  Surgeon: Zack Stearns MD;  Location:  GI     CYSTOSCOPY  09    [facility]     ENDOSCOPY  2000    Upper GI     HC COLONOSCOPY THRU STOMA, DIAGNOSTIC  10/7/04    poor prep, repeat in 2-3 years     HC COLONOSCOPY W/WO BRUSH/WASH  10/31/07    Repeat-poor quality prep     HC REMOVAL OF OVARY/TUBE(S)      Salpingo-Oophorectomy, Unilateral     HC REVISE MEDIAN N/CARPAL TUNNEL SURG      Carpal tunnel release     HC UGI ENDOSCOPY DIAG W BIOPSY  10/31/07     HC UGI ENDOSCOPY, SIMPLE EXAM  12/3/2007     OPEN REDUCTION INTERNAL FIXATION HIP NAILING Left 7/3/2016    Procedure: OPEN REDUCTION INTERNAL FIXATION HIP NAILING;  Surgeon: Lake Schulz MD;  Location:  OR       Family History   Problem Relation Age of Onset     DIABETES Father      AODM     Neurologic Disorder Father      Parkinson's     Blood Disease Father       from blood clot from leg to lung immediately after hip fracture     DIABETES Paternal Grandmother      adult onset     DIABETES Maternal Grandmother      adult onset     Hypertension No family hx of      CEREBROVASCULAR DISEASE No family hx of        Social History   Substance Use Topics     Smoking status: Former Smoker     Years: 10.00     Quit date: 1969     Smokeless tobacco: Never Used     Alcohol use 0.0 oz/week     0 Standard drinks or equivalent per week       Comment: occasional- 2 drinks per month       No current facility-administered medications for this encounter.      Current Outpatient Prescriptions   Medication     torsemide (DEMADEX) 20 MG tablet     loperamide (IMODIUM) 2 MG capsule     ferrous sulfate (IRON) 325 (65 FE) MG tablet     Warfarin Sodium (COUMADIN PO)     fluticasone (FLONASE) 50 MCG/ACT spray     guaiFENesin-dextromethorphan (ROBITUSSIN DM) 100-10 MG/5ML syrup     calcitRIOL (ROCALTROL) 0.5 MCG capsule     Calcium Carb-Cholecalciferol 500-400 MG-UNIT TABS     ipratropium - albuterol 0.5 mg/2.5 mg/3 mL (DUONEB) 0.5-2.5 (3) MG/3ML neb solution     ACETAMINOPHEN PO     TRAMADOL HCL PO     cyanocobalamin (VITAMIN B12) 1000 MCG/ML injection     aspirin 81 MG tablet     sodium bicarbonate 650 MG tablet     nitroGLYcerin (NITROSTAT) 0.4 MG sublingual tablet     gabapentin (NEURONTIN) 300 MG capsule     pravastatin (PRAVACHOL) 40 MG tablet     metoprolol (LOPRESSOR) 50 MG tablet     amLODIPine (NORVASC) 5 MG tablet     calcium acetate (PHOSLO) 667 MG CAPS capsule     pentoxifylline (TRENTAL) 400 MG CR tablet     Lactobacillus (ACIDOPHILUS) tablet     ORDER FOR DME        Allergies   Allergen Reactions     Ciprofloxacin Nausea and Vomiting     Zofran did not help     Oxycodone Visual Disturbance     Delusions, blackouts      Lisinopril Cough     Sulfa Drugs GI Disturbance     LOSS OF TASTE         Review of Systems   Constitutional: Negative for chills and fever.   HENT: Positive for congestion. Negative for sore throat.    Respiratory: Positive for cough (with sputum) and shortness of breath.    Cardiovascular: Negative for chest pain and leg swelling.   Gastrointestinal: Negative for abdominal pain and blood in stool.   Genitourinary: Negative for dysuria and frequency.   Skin:        Positive for blisters on legs   All other systems reviewed and are negative.      Physical Exam   BP: 138/69  Pulse: 93  Temp: 99  F (37.2  C)  Resp: 22  Height: 162.6 cm  "(5' 4\")  Weight: 79.4 kg (175 lb)  SpO2: 92 %  Physical Exam   Constitutional: She is oriented to person, place, and time. She appears well-developed and well-nourished.   HENT:   Head: Normocephalic and atraumatic.   Mouth/Throat: Oropharynx is clear and moist.   Cardiovascular: Normal rate, regular rhythm and normal heart sounds.    Pulmonary/Chest: No stridor. Tachypnea noted. She is in respiratory distress. She has rales.   Abdominal: Soft. There is no tenderness.   Musculoskeletal: She exhibits edema. She exhibits no tenderness.   Neurological: She is alert and oriented to person, place, and time. No cranial nerve deficit.   Skin: Skin is warm and dry. No rash noted.   Psychiatric: She has a normal mood and affect. Her behavior is normal.       ED Course   7:00 PM  The patient was seen and examined by Billy Agustin MD in Room ED06.     ED Course     Procedures             EKG Interpretation:      Interpreted by Billy Agustin  Time reviewed: 1920  Symptoms at time of EKG: dyspnea   Rhythm: normal sinus; occasional PVC   Rate: normal  Axis: normal  Ectopy: none  Conduction: normal  ST Segments/ T Waves: No ST-T wave changes  Q Waves: none  Comparison to prior: Unchanged    Clinical Impression: abnormal EKG         Labs Ordered and Resulted from Time of ED Arrival Up to the Time of Departure from the ED   CBC WITH PLATELETS DIFFERENTIAL - Abnormal; Notable for the following:        Result Value    RBC Count 3.35 (*)     Hemoglobin 8.6 (*)     Hematocrit 28.4 (*)     MCH 25.7 (*)     MCHC 30.3 (*)     RDW 17.6 (*)     All other components within normal limits   INR - Abnormal; Notable for the following:     INR 3.18 (*)     All other components within normal limits   BASIC METABOLIC PANEL - Abnormal; Notable for the following:     Chloride 114 (*)     Carbon Dioxide 17 (*)     Glucose 149 (*)     Urea Nitrogen 58 (*)     Creatinine 4.31 (*)     GFR Estimate 10 (*)     GFR Estimate If Black 12 (*)     Calcium 6.4 " (*)     All other components within normal limits   ROUTINE UA WITH MICROSCOPIC - Abnormal; Notable for the following:     Glucose Urine 30 (*)     Blood Urine Trace (*)     Leukocyte Esterase Urine Large (*)     WBC Urine 85 (*)     Bacteria Urine Few (*)     Yeast Urine Few (*)     Squamous Epithelial /HPF Urine 2 (*)     All other components within normal limits   NT PROBNP INPATIENT - Abnormal; Notable for the following:     N-Terminal Pro BNP Inpatient 59146 (*)     All other components within normal limits   ABO/RH TYPE AND SCREEN            Assessments & Plan (with Medical Decision Making)   77-year-old female with history significant for recurrent acute on chronic diastolic heart failure, chronic anemia, atrial fibrillation, anticoagulated with Coumadin.  The patient is sent [facility] due to concern of possible anemia in the setting of 1 week of exertional type shortness of breath.  Upon arrival, the patient is noted to be alert, she is afebrile and hemodynamically stable.  SPO2 is in the low 90s on 2 L which is her baseline.  She has no clinical evidence of increased work of breathing but I do appreciate some inspiratory rales in the bases.  No other evidence of significant cardiovascular compromise or fluid overload, slight elevation in JVD, chronic lower extremity edema noted.  A low suspicion for upper respiratory or lower respiratory infection but would obtain chest x-ray.  We did repeat laboratory studies that demonstrate her hemoglobin to be 8.6 which is actually her baseline.  I did perform a rectal examination which has FOBT negative with no gross blood. At this time it does not seem anemia is a contributing factor to exertional type shortness of breath, I would suspect this to be consistent with recurrent acute on chronic diastolic heart failure. I would obtain BMP, chest x-ray as well as further laboratory studies to assess for potential leukocytosis or electrolyte  disturbance contributing to symptoms.  Further at rest the patient appears well, we ll obtain ambulatory stats and pulse oximeter to evaluate for need for inpatient versus outpatient management.    The patient on ambulatory pulse oximeter does demonstrate hypoxia on her normal O2 down to the low 80s. She additionally becomes tachycardic up to the upper 110s. Of note the patient s weight has increased significantly. My concern at this time is for an acute on chronic heart failure exacerbation. Creatinine is near baseline. Additionally the patient has a question of possible developing consolidation as well as large leuk esterase in the urine. On review of last 5 urinalysis she always large leuk esterase. She is afebrile without systemic leukocytosis. Case discussed with Internal Medicine at this time we will await cultures and hold on prophylactic antibiotics unless the patient spikes a temperature. At this time she is appropriate for floor level of care. She has received 40 of Lasix in the Emergency Department with goal of gentle diuresis.    I have reviewed the nursing notes.    I have reviewed the findings, diagnosis, plan and need for follow up with the patient.    New Prescriptions    No medications on file       Final diagnoses:   Dyspnea on exertion     I, Alexys Toledo, am serving as a trained medical scribe to document services personally performed by Billy Agustin MD, based on the provider's statements to me.   IBilly MD, was physically present and have reviewed and verified the accuracy of this note documented by Alexys Toledo.    9/13/2017   [facility], EMERGENCY DEPARTMENT     Billy Agustin MD  09/13/17 1799

## 2017-09-14 NOTE — PROGRESS NOTES
[facility]  Patient is currently open to home care services with [facility].  The patient before previous hospitalization and admission to TCU was receiving RN services.  [facility]  and team have been notified of patient admission.  [facility] liaison will continue to follow patient during stay.  If she discharges home and appropriate provide orders to resume home care at time of discharge.  Thank you    Kathryn Mcnulty RN, BSN  [facility] Liaison  [phone]

## 2017-09-14 NOTE — PLAN OF CARE
Problem: Discharge Planning  Goal: Discharge Planning (Adult, OB, Behavioral, Peds)  -Diagnostic tests and consults completed and resulted: No   -Vital signs normal or at patient baseline: Yes   -Dyspnea improved and O2 sats greater than 88% on room air or prior home oxygen levels: No

## 2017-09-14 NOTE — PROGRESS NOTES
09/14/17 1454   Quick Adds   Quick Adds Certification   Type of Visit Initial PT Evaluation       Present no   Language english   Living Environment   Lives With spouse;grandchild(zoe);other (see comments)   Living Arrangements house   Home Accessibility bed and bath on same level;grab bars present (bathtub)   Number of Stairs to Enter Home 0   Number of Stairs Within Home 0   Stair Railings at Home none   Transportation Available family or friend will provide   Living Environment Comment Pt lives with , grand daughter, and 2 friends. Reports there is always someone at home if she were to need help with anything.    Self-Care   Dominant Hand right   Usual Activity Tolerance good   Current Activity Tolerance moderate   Equipment Currently Used at Home grab bar;walker, rolling   Activity/Exercise/Self-Care Comment Pt was admitted from TCU where she was participating in PT and OT daily. Reports she was walking up to 200 ft with fww and able to stand for 25 min for hygiene tasks. Reports she was planning to d/c to home from TCU on Tuesday (9/19/17)   Functional Level Prior   Ambulation 1-->assistive equipment   Transferring 1-->assistive equipment   Toileting 1-->assistive equipment  (grab bar)   Bathing 1-->assistive equipment  (grab bar)   Dressing 0-->independent   Eating 0-->independent   Communication 0-->understands/communicates without difficulty   Swallowing 0-->swallows foods/liquids without difficulty   Cognition 0 - no cognition issues reported   Fall history within last six months no   Which of the above functional risks had a recent onset or change? none   Prior Functional Level Comment PLOF is Mod I with use of fww or 4ww at home.    General Information   Onset of Illness/Injury or Date of Surgery - Date 09/13/17   Referring Physician Mike Irene MD   Patient/Family Goals Statement To return home vs back to TCU   Pertinent History of Current Problem (include personal  factors and/or comorbidities that impact the POC) 77 year old female admitted on 9/13/2017.  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   Precautions/Limitations oxygen therapy device and L/min   General Observations Pt seated up in chair upon arrival. Pleasant and agreeable to therapy session. Currently on 4L of O2 via NC. Reports she does not use O2 at home, but has been using the past few days at TCU d/t increased SOB.    General Info Comments Activity Orders: Ambulate with assist.    Cognitive Status Examination   Orientation orientation to person, place and time   Level of Consciousness alert   Follows Commands and Answers Questions 100% of the time   Personal Safety and Judgment intact   Memory intact   Pain Assessment   Patient Currently in Pain No   Integumentary/Edema   Integumentary/Edema Comments B LE wrapped d/t wounds. Completed by [facility].    Posture    Posture Forward head position;Protracted shoulders   Posture Comments Pt reports L LE is shorter than R LE. Hx of R hip surgery and believes that it is now shorter. Planning to obtain a shoe lift upon discharge from hospital.    Range of Motion (ROM)   ROM Comment B LE WFL   Strength   Strength Comments Demonstrates at least 3/5 B LE strength with functional mobility and transfers   Bed Mobility   Bed Mobility Comments NT   Transfer Skills   Transfer Comments Sit<>stands with SBA and fww   Gait   Gait Comments Ambulates ~200 ft total with fww and SBA. Pt ambulates with step to pattern - decreased stride length on L LE d/t leg length discrepancy. Rounded shoulders over fww and heavy use of UE on fww for support despite cues for upright  "posture. Steady on feet - no LOB. Occasional standing rest breaks taken d/t fatigue. SpO2 94% on 4 L of O2 with activity.    Balance   Balance Comments Seated: good. Standing: good with use of fww. Overall steady on feet - mild instability d/t leg length difference. No overt LOB.    Sensory Examination   Sensory Perception Comments Denies any numbness or tingling in B LE.    Clinical Impression   Criteria for Skilled Therapeutic Intervention evaluation only   PT Diagnosis Decreased functional mobility   Influenced by the following impairments Decreased strength and activity tolerance, mild balance impairment d/t leg length difference (planning for shoe lift)   Functional limitations due to impairments Impaired ability to functionally navigate in community   Clinical Presentation Stable/Uncomplicated   Clinical Presentation Rationale PM, recent admission in Aug 2017 for HF   Clinical Decision Making (Complexity) Moderate complexity   Therapy Frequency` (1x eval only)   Predicted Duration of Therapy Intervention (days/wks) (1x eval only)   Anticipated Equipment Needs at Discharge (pt has fww and 4ww at home)   Anticipated Discharge Disposition Home with Assist;Home with Home Therapy   Risk & Benefits of therapy have been explained Yes   Patient, Family & other staff in agreement with plan of care Yes   Clinical Impression Comments Pt currently completing all mobility with SBA and appropriate to discharge to home with  PT to progress strengthening and aerobic endurance. Has assist from family at home as needed. Planning to purchase a shoe lift once home as well for L LE. Pt in agreement with plan - motivated to return home as was planning to d/c from TCU on Tuesday anyways.    Therapy Certification   Start of care date 09/14/17   Certification date from 09/14/17   Certification date to 09/14/17   Medical Diagnosis acute on chronic heart failure   [facility] AM-PAC TM \"6 Clicks\"   2016, Trustees of [employer] " "[place], under license to [employer].  All rights reserved.   6 Clicks Short Forms Basic Mobility Inpatient Short Form   [facility] AM-PAC  \"6 Clicks\" V.2 Basic Mobility Inpatient Short Form   1. Turning from your back to your side while in a flat bed without using bedrails? 4 - None   2. Moving from lying on your back to sitting on the side of a flat bed without using bedrails? 4 - None   3. Moving to and from a bed to a chair (including a wheelchair)? 4 - None   4. Standing up from a chair using your arms (e.g., wheelchair, or bedside chair)? 4 - None   5. To walk in hospital room? 4 - None   6. Climbing 3-5 steps with a railing? 3 - A Little   Basic Mobility Raw Score (Score out of 24.Lower scores equate to lower levels of function) 23   Total Evaluation Time   Total Evaluation Time (Minutes) 20     "

## 2017-09-14 NOTE — PLAN OF CARE
Problem: Discharge Planning  Goal: Discharge Planning (Adult, OB, Behavioral, Peds)  Outcome: Improving  Outpatient/Observation goals to be met before discharge home:     -Diagnostic tests and consults completed and resulted: No   -Vital signs normal or at patient baseline: Yes   -Dyspnea improved and O2 sats greater than 88% on room air or prior home oxygen levels: Yes

## 2017-09-14 NOTE — PLAN OF CARE
Problem: Discharge Planning  Goal: Discharge Planning (Adult, OB, Behavioral, Peds)  Outcome: No Change  Outpatient/Observation goals to be met before discharge home:     -Diagnostic tests and consults completed and resulted: No, attempting to wean off oxygen, will monitor pt on RA overnight.   -Vital signs normal or at patient baseline: Yes   -Dyspnea improved and O2 sats greater than 88% on room air or prior home oxygen levels: Pending.

## 2017-09-14 NOTE — H&P
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  Ms. Banks states that since her recent discharge she has overall been doing well but has had mild progressive shortness of breath.  States she has been sleeping with the head of her bed more and more elevated.  Not sleeping in a recliner although she has had to do that in the past; worries she will have to do that again at home as her home bed is not adjustable.  Has been participating in rehab at [facility] and   States the chronic ulcers on her leg have been healing and are significantly improved since her recent discharge.  Endorses mild chronic cough, unchanged in past week.  Denies chest pain, headache/dizziness, fevers/chills, nausea/vomiting, changes in vision, or any other concerns.    Of note, the patient was initially sent to the ED with concern for hemoglobin 6.8 at TCU.  Patient's hemoglobin was 8.6 in the ED which is near baseline for her.    In the ED the patient was afebrile and hemodynamically stable.  Lab work was notable for creatinine 4.31 (improved from prior admission, near baseline), NT BNP 40093 (down from 37649 on 8/26), INR 3.18.  Imaging was notable for left pleural effusion mildly increased from 8/31.  The patient was given lasix 40 mg IV once and admitted for further management.    Review of Systems   The 10 point Review of Systems is negative other than noted in the HPI or  here.    Past Medical History    I have reviewed this patient's medical history and updated it with pertinent information if needed.   Past Medical History:   Diagnosis Date     Carpal tunnel syndrome      Coronary atherosclerosis of native coronary artery 2006    5 vessel CABG     OBSTRUCTIVE SLEEP APNEA     using CPAP     Osteoarthrosis, unspecified whether generalized or localized, unspecified site     generalized arthritis, particularly in her hands and feet         Other and combined forms of senile cataract 2000    Bilateral     Other and unspecified hyperlipidemia      RESTLESS LEGS SYNDROME      TENOSYNOV HAND/WRIST NEC 6/30/2006     Type II or unspecified type diabetes mellitus without mention of complication, not stated as uncontrolled 2001    Diabetes mellitus/pt is diet controlled with weight loss     Typical atrial flutter (H) 01/17/2007    ablation 6/7/2017     Unspecified essential hypertension         Past Surgical History   I have reviewed this patient's surgical history and updated it with pertinent information if needed.  Past Surgical History:   Procedure Laterality Date     ANGIOPLASTY       C APPENDECTOMY       C CABG, VEIN, FIVE  12/06    SVG x 4 and LIMA to LAD     C SOTO W/O FACETEC FORAMOT/DSKC 1/2 VRT SEG, LUMBAR  1968     C REMV CATARACT INTRACAP,INSERT LENS      Bilateral     C TOTAL ABDOM HYSTERECTOMY  1995     - fibroids     C VAGOTOMY/PYLOROPLASTY,SELECT  1970     COLONOSCOPY  12/3/2007     COLONOSCOPY  1/17/2014    Procedure: COLONOSCOPY;  Colonoscopy;  Surgeon: Zack Stearns MD;  Location: [facility]     CYSTOSCOPY  11/25/09    [facility]     ENDOSCOPY  03/21/2000    Upper GI     HC COLONOSCOPY THRU STOMA, DIAGNOSTIC  10/7/04    poor prep, repeat in 2-3 years     HC COLONOSCOPY W/WO BRUSH/WASH  10/31/07    Repeat-poor quality prep     HC REMOVAL OF OVARY/TUBE(S)      Salpingo-Oophorectomy, Unilateral     HC REVISE MEDIAN N/CARPAL TUNNEL SURG      Carpal tunnel release     HC UGI  ENDOSCOPY DIAG W BIOPSY  10/31/07      UGI ENDOSCOPY, SIMPLE EXAM  12/3/2007     OPEN REDUCTION INTERNAL FIXATION HIP NAILING Left 7/3/2016    Procedure: OPEN REDUCTION INTERNAL FIXATION HIP NAILING;  Surgeon: Lake Schulz MD;  Location:  OR        Social History   Social History   Substance Use Topics     Smoking status: Former Smoker     Years: 10.00     Quit date: 1969     Smokeless tobacco: Never Used     Alcohol use 0.0 oz/week     0 Standard drinks or equivalent per week      Comment: occasional- 2 drinks per month       Family History   I have reviewed this patient's family history and updated it with pertinent information if needed.   Family History   Problem Relation Age of Onset     DIABETES Father      AODM     Neurologic Disorder Father      Parkinson's     Blood Disease Father       from blood clot from leg to lung immediately after hip fracture     DIABETES Paternal Grandmother      adult onset     DIABETES Maternal Grandmother      adult onset     Hypertension No family hx of      CEREBROVASCULAR DISEASE No family hx of        Prior to Admission Medications   Prior to Admission Medications   Prescriptions Last Dose Informant Patient Reported? Taking?   ACETAMINOPHEN PO   Yes No   Sig: Take 650 mg by mouth every 4 hours as needed for pain For pain 1-5 out of 10   Calcium Carb-Cholecalciferol 500-400 MG-UNIT TABS   Yes No   Sig: Take 1 tablet by mouth 2 times daily   Lactobacillus (ACIDOPHILUS) tablet   No No   Sig: Take 1 tablet by mouth daily   ORDER FOR DME   No No   Sig: Equipment being ordered: cpap machine, mask, humidifier, tubing and filters   TRAMADOL HCL PO   Yes No   Sig: Take 50 mg by mouth every 6 hours as needed for moderate to severe pain Pain of 6-10 out of 10   Warfarin Sodium (COUMADIN PO)   Yes No   Sig: Take by mouth daily Take as directed per INR results   amLODIPine (NORVASC) 5 MG tablet   No No   Sig: Take 2 tablets (10 mg) by mouth daily   aspirin 81 MG tablet    No No   Sig: Take 1 tablet (81 mg) by mouth daily   calcitRIOL (ROCALTROL) 0.5 MCG capsule   No No   Sig: Take 1 capsule (0.5 mcg) by mouth daily   calcium acetate (PHOSLO) 667 MG CAPS capsule   No No   Sig: Take 1 capsule (667 mg) by mouth 3 times daily (with meals)   cyanocobalamin (VITAMIN B12) 1000 MCG/ML injection   Yes No   Sig: Inject 1 mL into the muscle every 30 days   ferrous sulfate (IRON) 325 (65 FE) MG tablet   Yes No   Sig: Take 325 mg by mouth 2 times daily   fluticasone (FLONASE) 50 MCG/ACT spray   No No   Sig: Spray 1 spray into both nostrils daily   gabapentin (NEURONTIN) 300 MG capsule   No No   Sig: Take 1 capsule (300 mg) by mouth At Bedtime   guaiFENesin-dextromethorphan (ROBITUSSIN DM) 100-10 MG/5ML syrup   No No   Sig: Take 5 mLs by mouth every 8 hours as needed for cough or congestion   ipratropium - albuterol 0.5 mg/2.5 mg/3 mL (DUONEB) 0.5-2.5 (3) MG/3ML neb solution   Yes No   Sig: Take 1 vial by nebulization 3 times daily   loperamide (IMODIUM) 2 MG capsule   Yes No   Sig: Take 2 mg by mouth 3 times daily as needed for diarrhea   metoprolol (LOPRESSOR) 50 MG tablet   No No   Sig: Take 0.5 tablets (25 mg) by mouth 2 times daily   nitroGLYcerin (NITROSTAT) 0.4 MG sublingual tablet   No No   Sig: Place 1 tablet (0.4 mg) under the tongue every 5 minutes as needed for chest pain   pentoxifylline (TRENTAL) 400 MG CR tablet   No No   Sig: Take 1 tablet (400 mg) by mouth daily   pravastatin (PRAVACHOL) 40 MG tablet   No No   Sig: Take 1 tablet (40 mg) by mouth daily   sodium bicarbonate 650 MG tablet   No No   Sig: Take 2 tablets (1,300 mg) by mouth 3 times daily   torsemide (DEMADEX) 20 MG tablet   Yes No   Sig: Take 40 mg by mouth daily And 20mg PO QD      Facility-Administered Medications: None     Allergies   Allergies   Allergen Reactions     Ciprofloxacin Nausea and Vomiting     Zofran did not help     Oxycodone Visual Disturbance     Delusions, blackouts      Lisinopril Cough     Sulfa  Drugs GI Disturbance     LOSS OF TASTE       Physical Exam   Vital Signs: Temp: 99  F (37.2  C) Temp src: Oral BP: 130/78 Pulse: 93   Resp: 22 SpO2: 95 % O2 Device: Nasal cannula    Weight: 175 lbs 0 oz    General Appearance: Sitting in wheelchair, no acute distress   Eyes: No scleral icterus, no injection  HEENT: Oropharynx unremarkable, head atraumatic  Respiratory: Clear to auscultation bilaterally, no crackles/wheezes appreciated  Cardiovascular: Regular rate and rhythm, normal S1/S2, JVP difficult to assess but appears not elevated  GI: Non-tender, non-distended  Lymph/Hematologic: No supraclavicular/cervical LAD, no bleeding  Skin: No rash, no jaundice  Musculoskeletal: 2+ bilateral lower extremity edema, multiple small healing ulcers noted on bilateral lower extremities (appear to be healing compared to exam on prior admit)  Neurologic: AOx3, CN 2-12 grossly intact    Assessment & Plan   Kathryn Banks is a 77 year old female admitted on 9/13/2017.  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    # Mild acute on chronic diastolic heart failure exacerbation with preserved ejection fraction  # CKD  Patient weight up 6kg over past 10 days with mild increase in SOB.  Would benefit from brief course of IV diuresis and ongoing adjustment of outpatient diuretic regimen.  Lasix given this PM in ED, will monitor UOP overnight and re-dose as needed.  Was adjusted to torsemide 40 mg QAM and 20mg QPM on 9/12; weight was down 1lb as of 9/13 but too early to tell if this outpatient regimen is effective.  Follows with Dr. Barfield as outpatient for CKD, creatinine slightly improved from baseline on  admission.  - Strict I/O  - Daily weights  - AM BMP  - Continue PTA amlodipine, ASA, metoprolol, pravastatin  - Further diuresis pending output    # L pleural effusion  Mild increase from 8/31 on imaging.  Suspect SOB is secondary to generalized volume overload; would expect to resolve with better control of CHF.  If pleural effusion persists following more effective diuresis would consider further workup.    # Asymptomatic bacteruria  UA notable for 85 WBC with large leuk esterase and few bacteria.  Denies dysuria or increased frequency.  No indication for treatment as this time.  - F/u urine culture    # Chronic lower extremity venous ulcers   Mild improvement from previous admission.  Is planned for follow up with dermatology and vascular surgery.    Chronic medical problems:  # Asymptomatic chronic hypocalcemia - continue PTA calcitriol, calcium carbonate  # Hx afib w/RVR - continue PTA warfarin, metoprolol      Diet: 2 Gram Sodium Diet  Fluid restriction 1500 ML FLUID  Fluids: N/A  DVT Prophylaxis: Warfarin  Code Status: Full Code    Disposition Plan   Expected discharge: Tomorrow; recommended to transitional care unit once safe disposition plan/ TCU bed available.     Entered: Mike Tadeo 09/13/2017, 10:26 PM   Information in the above section will display in the discharge planner report.    The patient was discussed with Dr. Chapman.    Mike Tadeo  [facility]   Pager: [phone]  Please see sticky note for cross cover information      Data   Data     Recent Labs  Lab 09/13/17  1849 09/08/17  0800 09/08/17  0700   WBC 9.2  --   --    HGB 8.6*  --   --    MCV 85  --   --      --   --    INR 3.18*  --   --     146 146*   POTASSIUM 3.6 4.9 4.9   CHLORIDE 114* 115 115*   CO2 17* 20 20*   BUN 58* 40 40*   CR 4.31* 4.79 4.79*   ANIONGAP 14 11.0 11.0   MALICK 6.4* 6.4 6.4*   * 49* 49*     Recent Results (from the past 24 hour(s))   XR Chest 2 Views     Narrative    XR CHEST 2 VW 9/13/2017 7:56 PM    Comparison: 8/31/2017    History: SOB    Findings: PA and lateral views of the chest. Median sternotomy wires.  External device projecting over the left heart border. Left pleural  effusion with left basilar consolidation/atelectasis, not  significantly changed. The pulmonary vasculature is distinct. No  pneumothorax.      Impression    Impression: Left pleural effusion with left basilar associated  atelectasis versus consolidation, mildly increased from 8/31/2017.    I have personally reviewed the examination and initial interpretation  and I agree with the findings.    EDUARDO ANGELES MD

## 2017-09-14 NOTE — PHARMACY-ANTICOAGULATION SERVICE
Clinical Pharmacy - Warfarin Dosing Consult     Pharmacy has been consulted to manage this patient s warfarin therapy.  Indication: Atrial Fibrillation  Therapy Goal: INR 2-3  Warfarin Prior to Admission: Yes  Warfarin PTA Regimen: 2 mg daily  Significant drug interactions: aspirin    INR   Date Value Ref Range Status   09/13/2017 3.18 (H) 0.86 - 1.14 Final   09/06/2017 2.11 (H) 0.86 - 1.14 Final       Recommend warfarin 1.5 mg now since INR is slightly supratherpeutic and patient did not receive a dose on 9/13.  Pharmacy will monitor Kathryn Banks daily and order warfarin doses to achieve specified goal.      Please contact pharmacy as soon as possible if the warfarin needs to be held for a procedure or if the warfarin goals change.        Zahira Eastman, Pharm.D.

## 2017-09-15 ENCOUNTER — TELEPHONE (OUTPATIENT)
Dept: ANTICOAGULATION | Facility: CLINIC | Age: 77
End: 2017-09-15

## 2017-09-15 ENCOUNTER — DOCUMENTATION ONLY (OUTPATIENT)
Dept: MEDSURG UNIT | Facility: CLINIC | Age: 77
End: 2017-09-15

## 2017-09-15 VITALS
RESPIRATION RATE: 16 BRPM | BODY MASS INDEX: 28.63 KG/M2 | HEIGHT: 64 IN | OXYGEN SATURATION: 92 % | SYSTOLIC BLOOD PRESSURE: 119 MMHG | WEIGHT: 167.7 LBS | TEMPERATURE: 98.7 F | HEART RATE: 86 BPM | DIASTOLIC BLOOD PRESSURE: 59 MMHG

## 2017-09-15 LAB
ANION GAP SERPL CALCULATED.3IONS-SCNC: 12 MMOL/L (ref 3–14)
BUN SERPL-MCNC: 64 MG/DL (ref 7–30)
CALCIUM SERPL-MCNC: 6.7 MG/DL (ref 8.5–10.1)
CHLORIDE SERPL-SCNC: 114 MMOL/L (ref 94–109)
CO2 SERPL-SCNC: 19 MMOL/L (ref 20–32)
CREAT SERPL-MCNC: 4.5 MG/DL (ref 0.52–1.04)
ERYTHROCYTE [DISTWIDTH] IN BLOOD BY AUTOMATED COUNT: 17.7 % (ref 10–15)
GFR SERPL CREATININE-BSD FRML MDRD: 10 ML/MIN/1.7M2
GLUCOSE SERPL-MCNC: 94 MG/DL (ref 70–99)
HCT VFR BLD AUTO: 23.1 % (ref 35–47)
HGB BLD-MCNC: 7 G/DL (ref 11.7–15.7)
HGB BLD-MCNC: 7.7 G/DL (ref 11.7–15.7)
INR PPP: 3.66 (ref 0.86–1.14)
INTERPRETATION ECG - MUSE: NORMAL
MCH RBC QN AUTO: 25.5 PG (ref 26.5–33)
MCHC RBC AUTO-ENTMCNC: 30.3 G/DL (ref 31.5–36.5)
MCV RBC AUTO: 84 FL (ref 78–100)
PLATELET # BLD AUTO: 276 10E9/L (ref 150–450)
POTASSIUM SERPL-SCNC: 3.2 MMOL/L (ref 3.4–5.3)
RBC # BLD AUTO: 2.75 10E12/L (ref 3.8–5.2)
SODIUM SERPL-SCNC: 145 MMOL/L (ref 133–144)
WBC # BLD AUTO: 6.7 10E9/L (ref 4–11)

## 2017-09-15 PROCEDURE — 85018 HEMOGLOBIN: CPT | Mod: 91 | Performed by: HOSPITALIST

## 2017-09-15 PROCEDURE — G0378 HOSPITAL OBSERVATION PER HR: HCPCS

## 2017-09-15 PROCEDURE — S0169 CALCITROL: HCPCS | Mod: GY | Performed by: STUDENT IN AN ORGANIZED HEALTH CARE EDUCATION/TRAINING PROGRAM

## 2017-09-15 PROCEDURE — A9270 NON-COVERED ITEM OR SERVICE: HCPCS | Mod: GY | Performed by: INTERNAL MEDICINE

## 2017-09-15 PROCEDURE — A9270 NON-COVERED ITEM OR SERVICE: HCPCS | Mod: GY | Performed by: STUDENT IN AN ORGANIZED HEALTH CARE EDUCATION/TRAINING PROGRAM

## 2017-09-15 PROCEDURE — 25000132 ZZH RX MED GY IP 250 OP 250 PS 637: Mod: GY | Performed by: INTERNAL MEDICINE

## 2017-09-15 PROCEDURE — 25000132 ZZH RX MED GY IP 250 OP 250 PS 637: Mod: GY | Performed by: STUDENT IN AN ORGANIZED HEALTH CARE EDUCATION/TRAINING PROGRAM

## 2017-09-15 PROCEDURE — 85027 COMPLETE CBC AUTOMATED: CPT | Performed by: STUDENT IN AN ORGANIZED HEALTH CARE EDUCATION/TRAINING PROGRAM

## 2017-09-15 PROCEDURE — 80048 BASIC METABOLIC PNL TOTAL CA: CPT | Performed by: STUDENT IN AN ORGANIZED HEALTH CARE EDUCATION/TRAINING PROGRAM

## 2017-09-15 PROCEDURE — 99217 ZZC OBSERVATION CARE DISCHARGE: CPT | Mod: GC | Performed by: HOSPITALIST

## 2017-09-15 PROCEDURE — 85610 PROTHROMBIN TIME: CPT | Performed by: STUDENT IN AN ORGANIZED HEALTH CARE EDUCATION/TRAINING PROGRAM

## 2017-09-15 PROCEDURE — 36415 COLL VENOUS BLD VENIPUNCTURE: CPT | Performed by: HOSPITALIST

## 2017-09-15 PROCEDURE — 36415 COLL VENOUS BLD VENIPUNCTURE: CPT | Performed by: STUDENT IN AN ORGANIZED HEALTH CARE EDUCATION/TRAINING PROGRAM

## 2017-09-15 RX ORDER — TORSEMIDE 20 MG/1
40 TABLET ORAL 2 TIMES DAILY
Qty: 30 TABLET | Refills: 3 | Status: ON HOLD | OUTPATIENT
Start: 2017-09-15 | End: 2017-10-18

## 2017-09-15 RX ORDER — METRONIDAZOLE 500 MG/1
500 TABLET ORAL EVERY 8 HOURS
Qty: 42 TABLET | Refills: 0 | Status: SHIPPED | OUTPATIENT
Start: 2017-09-15 | End: 2017-09-22

## 2017-09-15 RX ORDER — PRAMIPEXOLE DIHYDROCHLORIDE 0.12 MG/1
0.12 TABLET ORAL 3 TIMES DAILY
Qty: 90 TABLET | Refills: 3 | Status: SHIPPED | OUTPATIENT
Start: 2017-09-15 | End: 2018-05-21

## 2017-09-15 RX ORDER — TORSEMIDE 20 MG/1
40 TABLET ORAL 2 TIMES DAILY
Qty: 30 TABLET | Refills: 3 | Status: SHIPPED | OUTPATIENT
Start: 2017-09-15 | End: 2017-09-15

## 2017-09-15 RX ADMIN — CALCIUM ACETATE 667 MG: 667 CAPSULE ORAL at 12:30

## 2017-09-15 RX ADMIN — PENTOXIFYLLINE 400 MG: 400 TABLET, FILM COATED, EXTENDED RELEASE ORAL at 07:46

## 2017-09-15 RX ADMIN — CALCITRIOL 0.5 MCG: 0.5 CAPSULE, LIQUID FILLED ORAL at 07:40

## 2017-09-15 RX ADMIN — FERROUS SULFATE TAB 325 MG (65 MG ELEMENTAL FE) 325 MG: 325 (65 FE) TAB at 07:41

## 2017-09-15 RX ADMIN — METRONIDAZOLE 500 MG: 500 TABLET ORAL at 16:00

## 2017-09-15 RX ADMIN — SODIUM BICARBONATE 650 MG TABLET 1300 MG: at 07:40

## 2017-09-15 RX ADMIN — METRONIDAZOLE 500 MG: 500 TABLET ORAL at 07:40

## 2017-09-15 RX ADMIN — Medication 1 TABLET: at 07:40

## 2017-09-15 RX ADMIN — PRAMIPEXOLE DIHYDROCHLORIDE 0.12 MG: 0.12 TABLET ORAL at 07:39

## 2017-09-15 RX ADMIN — FLUTICASONE PROPIONATE 1 SPRAY: 50 SPRAY, METERED NASAL at 07:41

## 2017-09-15 RX ADMIN — AMLODIPINE BESYLATE 10 MG: 10 TABLET ORAL at 07:40

## 2017-09-15 RX ADMIN — PRAVASTATIN SODIUM 40 MG: 40 TABLET ORAL at 07:40

## 2017-09-15 RX ADMIN — PRAMIPEXOLE DIHYDROCHLORIDE 0.12 MG: 0.12 TABLET ORAL at 16:01

## 2017-09-15 RX ADMIN — METOPROLOL TARTRATE 25 MG: 25 TABLET, FILM COATED ORAL at 07:40

## 2017-09-15 RX ADMIN — CALCIUM ACETATE 667 MG: 667 CAPSULE ORAL at 07:45

## 2017-09-15 RX ADMIN — ASPIRIN 81 MG CHEWABLE TABLET 81 MG: 81 TABLET CHEWABLE at 07:40

## 2017-09-15 NOTE — PROGRESS NOTES
Received intake call for home oxygen at 2:59 pm. Reviewed patient's chart; Patient qualifies under Medicare guidelines and all documentation is in the chart except that the order is not signed yet.  3:13pm- Called to offer choice and patient is okay with [facility] setting her up. Discussed equipment with patient and POC and explained that we were just waiting for the doctor to sign the order, but once that was done we would be right over. She understood.   3:07pm- Called nurse, Yesy addend her notes to reflect the lowest SAT level, instead of a range.    3:12pm- Spoke with care coordinator, Sindy, confirmed we received the order, and explained that all we needed was the order signed. She said she would get right on it, as the patietn was hoping to leave ASAP.

## 2017-09-15 NOTE — PROGRESS NOTES
Patient's oxygen saturation is between 90-93% when awake but drops to 86% when sleeping with her home CPAP without oxygen. Will continue to monitor.

## 2017-09-15 NOTE — DISCHARGE SUMMARY
General acute hospital, [facility]    Internal Medicine Discharge Summary- [facility]    Date of Admission:  9/13/2017  Date of Discharge:  9/15/2017  Discharging Attending Provider: Chris Irene  Discharge Team: [facility]    Discharge Diagnoses   C. Diff colitis  Subacute respiratory failure 2/2 pulmonary edema  HFpEF    Follow-ups Needed After Discharge   INR check on Monday  [facility] f/u for diuretic management  PCP within 1-2 weeks    Hospital Course     Kathryn Banks is a 77 year old female admitted on 9/13/2017. 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 Also found to have c. Diff diarrhea. Pt is coming from TCU and would like to discharge home from here (was previously planning to go home on Tuesday).      # Acute on chronic diastolic heart failure with preserved EF  # CKD  # L pleural effusion  # Subacute hypoxic respiratory failure 2/2 pulmonary edema  Patient weight up 6kg over past 10 days prior to admission with mild increase in SOB; torsemide had been uptitrated to 40 mg qAM, 20 mg qPM on 9/12. Pt received lasix 40 mg IV x3 during hospitalization with improvement in symptoms. Her oxygenation improved, but she still did require 2L NC to keep SpO2 > 90% (not on supplemental O2 long term, but was discharged on oxygen earlier this month). Follows with Dr. Barfield as outpatient for CKD and has follow-up appointment on 9/19. Her creatinine was essentially unchanged from her baseline.  At time of discharge, her torsemide dose was increased to 40 mg PO BID. She also has follow up with PCP on 9/21 and in [facility] on 9/22.      # Mild clostridium difficile diarrhea  Significant diarrhea but  hemodynamically stable, no leukocytosis.    - Flagyl 500mg TID 9/14 - 9/28      # Chronic lower extremity venous ulcers   Mild improvement from previous admission.  Is planned for follow up with dermatology and vascular surgery. Wound RN consulted.      Chronic medical problems:  # Asymptomatic chronic hypocalcemia - continue PTA calcitriol, calcium carbonate  # Hx afib w/RVR - continue PTA warfarin, metoprolol    Consultations This Hospital Stay   PHARMACY TO DOSE WARFARIN  WOUND OSTOMY CONTINENCE NURSE  IP CONSULT  OCCUPATIONAL THERAPY ADULT IP CONSULT  PHYSICAL THERAPY ADULT IP CONSULT    Code Status   Full Code     The patient was discussed with Dr. Chris Marr  [facility]  Pager: [phone]  ______________________________________________________________________    Physical Exam   Vital Signs: Temp: 98.7  F (37.1  C) Temp src: Oral BP: 119/59 Pulse: 86   Resp: 16 SpO2: 92 % O2 Device: None (Room air)    Weight: 167 lbs 11.2 oz    General Appearance: Elderly, chronically ill appearing, no acute distress  Respiratory: Bibasilar crackles, no wheezes  Cardiovascular: rrr no m/r/g  Ext: 1+ pedal edema  Skin: see WOC note for photos       Primary Care Physician   Dheeraj Jj    Discharge Disposition   Discharged to home  Condition at discharge: Stable    Discharge Orders     Home care nursing referral     MD face to face encounter   Documentation of Face to Face and Certification for Home Health Services    I certify that patient: Kathryn Banks is under my care and that I, or a nurse practitioner or physician's assistant working with me, had a face-to-face encounter that meets the physician face-to-face encounter requirements with this patient on: 9/14/2017.    This encounter with the patient was in whole, or in part, for the following medical condition, which is the primary reason for home health care: LE vascular ulcers, CKD, Afib, CABG.    I certify that, based  on my findings, the following services are medically necessary home health services: Nursing.    My clinical findings support the need for the above services because: Nurse is needed: To provide caregiver training to assist with: wound cares, assess status after hospitalization and TCU.    Further, I certify that my clinical findings support that this patient is homebound (i.e. absences from home require considerable and taxing effort and are for medical reasons or [employer] services or infrequently or of short duration when for other reasons) because: weakness, deconditioning.    Based on the above findings. I certify that this patient is confined to the home and needs intermittent skilled nursing care, physical therapy and/or speech therapy.  The patient is under my care, and I have initiated the establishment of the plan of care.  This patient will be followed by a physician who will periodically review the plan of care.  Physician/Provider to provide follow up care: Dheeraj Jj    Attending hospital physician (the Medicare certified [place] provider): Mike Irene MD  Physician Signature: See electronic signature associated with these discharge orders.  Date: 9/14/2017     Adult [facility] Follow-up and recommended labs and tests   Follow up with primary care provider, Dheeraj Jj, within 7 days for hospital follow- up.  The following labs/tests are recommended: BMP.     Follow-up with [facility] within 2 weeks.     Follow-up for INR check within 1-2 days.     Appointments on [place] and/or [facility] (with [facility] or [facility] provider or service). Call [phone] if you haven't heard regarding these appointments within 7 days of discharge.     Activity   Your activity upon discharge: activity as tolerated     Reason for your hospital stay   Dear Kathryn Banks    Your were hospitalized at [facility] with shortness of breath and diarrhea.The shortness of breath was  "treated with IV diuretics. Your diarrhea is from an infection called Clostridium difficile or \"C. Diff\". We treat this with an antibiotic called flagyl. Over your hospitalization your breathing improved and today you are ready to be discharged home.  If you continue antibiotics and diuretics you should continue to improve but if you develop fever, shortness of breath, light headedness, or chest pain please seek medical attention.    We are suggesting the following medication changes:  Increase toresmide to 40mg orally twice daily  Start flagyl three times daily    Hold your warfarin tonight and tomorrow. Restart at 2mg on Sunday.     You have appointments set up with [facility] and PCP. You will also need an INR check and warfarin adjustments.    It was a pleasure meeting with you today. Thank you for allowing me and my team the privilege of caring for you today. You are the reason we are here, and I truly hope we provided you with the excellent service you deserve. Please let us know if there is anything else we can do for you so that we can be sure you are leaving completely satisfied with your care experience.    Your hospital unit at the time of discharge is 6D so if you have any questions please call the hospital at [phone] and ask to talk to a nurse on 6D.    Take care!  Neema Marr MD  Department of Medicine  [facility]     Oxygen Adult   [facility] Order 2 liters by nasal cannula continuously with use of portable tank. Expected treatment length is 3 months.. Test on conserving device as applicable.  Patient is ambulatory within the home    Patients who qualify for home O2 coverage under the CMS guidelines require ABG tests or O2 sat readings obtained closest to, but no earlier than 2 days prior to the discharge, as evidence of the need for home oxygen therapy. Testing must be performed while patient is in the chronic stable state. See notes for O2 sats.    I certify that this " patient, Kathryn Banks has been under my care and that I, or a nurse practitioner or physician's assistant working with me, had a face-to-face encounter that meets the face-to-face encounter requirements with this patient on 9/15/2017. The patient, Kathryn Banks was evaluated or treated in whole, or in part, for the following medical condition, which necessitates the use of the ordered oxygen. Treatment Diagnosis: CHF, volume overload, pulmonary Htn    Attending Provider: Mike Irene MD  Physician signature: See electronic signature associated with these discharge orders  Date of Order: September 15, 2017     Diet   Follow this diet upon discharge: Orders Placed This Encounter     2 Gram Sodium Diet       Discharge Medications   Current Discharge Medication List      START taking these medications    Details   metroNIDAZOLE (FLAGYL) 500 MG tablet Take 1 tablet (500 mg) by mouth every 8 hours  Qty: 42 tablet, Refills: 0    Associated Diagnoses: Colitis due to Clostridium difficile      pramipexole (MIRAPEX) 0.125 MG tablet Take 1 tablet (0.125 mg) by mouth 3 times daily  Qty: 90 tablet, Refills: 3    Associated Diagnoses: Restless leg syndrome         CONTINUE these medications which have CHANGED    Details   torsemide (DEMADEX) 20 MG tablet Take 2 tablets (40 mg) by mouth 2 times daily  Qty: 30 tablet, Refills: 3    Associated Diagnoses: Chronic diastolic congestive heart failure (H)         CONTINUE these medications which have NOT CHANGED    Details   ferrous sulfate (IRON) 325 (65 FE) MG tablet Take 325 mg by mouth 2 times daily      Warfarin Sodium (COUMADIN PO) Take by mouth daily Take as directed per INR results      fluticasone (FLONASE) 50 MCG/ACT spray Spray 1 spray into both nostrils daily  Qty: 1 Bottle, Refills: 0    Associated Diagnoses: Nasal congestion      guaiFENesin-dextromethorphan (ROBITUSSIN DM) 100-10 MG/5ML syrup Take 5 mLs by mouth every 8 hours as needed for cough or  congestion  Qty: 560 mL, Refills: 0    Associated Diagnoses: Cough      calcitRIOL (ROCALTROL) 0.5 MCG capsule Take 1 capsule (0.5 mcg) by mouth daily    Associated Diagnoses: Constitutional chronic hypocalcemia      Calcium Carb-Cholecalciferol 500-400 MG-UNIT TABS Take 1 tablet by mouth 2 times daily      ipratropium - albuterol 0.5 mg/2.5 mg/3 mL (DUONEB) 0.5-2.5 (3) MG/3ML neb solution Take 1 vial by nebulization 3 times daily      ACETAMINOPHEN PO Take 650 mg by mouth every 4 hours as needed for pain For pain 1-5 out of 10      TRAMADOL HCL PO Take 50 mg by mouth every 6 hours as needed for moderate to severe pain Pain of 6-10 out of 10      cyanocobalamin (VITAMIN B12) 1000 MCG/ML injection Inject 1 mL into the muscle every 30 days      aspirin 81 MG tablet Take 1 tablet (81 mg) by mouth daily  Qty: 30 tablet, Refills: 3    Associated Diagnoses: Coronary artery disease involving native coronary artery of native heart without angina pectoris      sodium bicarbonate 650 MG tablet Take 2 tablets (1,300 mg) by mouth 3 times daily    Associated Diagnoses: Chronic kidney disease, unspecified CKD stage      nitroGLYcerin (NITROSTAT) 0.4 MG sublingual tablet Place 1 tablet (0.4 mg) under the tongue every 5 minutes as needed for chest pain  Qty: 25 tablet, Refills: 0    Associated Diagnoses: Hx of non-ST elevation myocardial infarction (NSTEMI); Atherosclerosis of native coronary artery of native heart without angina pectoris; Postsurgical aortocoronary bypass status      gabapentin (NEURONTIN) 300 MG capsule Take 1 capsule (300 mg) by mouth At Bedtime  Qty: 90 capsule    Associated Diagnoses: Diabetic polyneuropathy associated with type 2 diabetes mellitus (H)      pravastatin (PRAVACHOL) 40 MG tablet Take 1 tablet (40 mg) by mouth daily  Qty: 90 tablet, Refills: 3    Associated Diagnoses: Hx of non-ST elevation myocardial infarction (NSTEMI); Atherosclerosis of native coronary artery of native heart without angina  pectoris; Type 2 diabetes mellitus with other circulatory complications (H)      metoprolol (LOPRESSOR) 50 MG tablet Take 0.5 tablets (25 mg) by mouth 2 times daily  Qty: 180 tablet, Refills: 3    Associated Diagnoses: Atrial fibrillation with rapid ventricular response (H)      amLODIPine (NORVASC) 5 MG tablet Take 2 tablets (10 mg) by mouth daily  Qty: 30 tablet, Refills: 3    Associated Diagnoses: Benign essential hypertension      calcium acetate (PHOSLO) 667 MG CAPS capsule Take 1 capsule (667 mg) by mouth 3 times daily (with meals)  Qty: 180 capsule    Associated Diagnoses: Chronic kidney disease, unspecified CKD stage      pentoxifylline (TRENTAL) 400 MG CR tablet Take 1 tablet (400 mg) by mouth daily    Associated Diagnoses: Venous stasis ulcer (H)      Lactobacillus (ACIDOPHILUS) tablet Take 1 tablet by mouth daily    Associated Diagnoses: Urinary tract infection without hematuria, site unspecified; Cellulitis of lower extremity, unspecified laterality      ORDER FOR DME Equipment being ordered: cpap machine, mask, humidifier, tubing and filters  Qty: 1 Device, Refills: 0    Associated Diagnoses: ANABEL (obstructive sleep apnea)         STOP taking these medications       loperamide (IMODIUM) 2 MG capsule Comments:   Reason for Stopping:             Allergies   Allergies   Allergen Reactions     Ciprofloxacin Nausea and Vomiting     Zofran did not help     Oxycodone Visual Disturbance     Delusions, blackouts      Lisinopril Cough     Sulfa Drugs GI Disturbance     LOSS OF TASTE     Physician Attestation   I, iMke Irene, saw and evaluated this patient prior to discharge.  I discussed the patient with the resident and agree with plan of care as documented in the resident note. I personally reviewed vital signs, medications and labs. I personally spent 25 minutes on discharge activities.    Mike Irene  Date of Service (when I saw the patient): 9/15/17

## 2017-09-15 NOTE — TELEPHONE ENCOUNTER
Pt is overdue for INR check, but she has been in the NH and is now hospitalized. NH manages INR  Zo Davis RN

## 2017-09-15 NOTE — PLAN OF CARE
"Problem: Discharge Planning  Goal: Discharge Planning (Adult, OB, Behavioral, Peds)  /59  Pulse 86  Temp 98.7  F (37.1  C) (Oral)  Resp 16  Ht 1.626 m (5' 4\")  Wt 76.1 kg (167 lb 11.2 oz)  SpO2 92%  Breastfeeding? No  BMI 28.79 kg/m2  diagnostic tests and consults completed and resulted : YES  -vital signs normal or at patient baseline : YES  -dyspnea improved and O2 sats greater than 88% on room air or prior home oxygen levels : NO, plan to go home with home oxygen    Nurse to notify provider when observation goals have been met and patient is ready for discharge      "

## 2017-09-15 NOTE — PROGRESS NOTES
Care Coordinator Progress Note     Admission Date/Time:  9/13/2017  Attending MD:  Mike Irene MD     Data  Chart reviewed, discussed with interdisciplinary team.   Patient was admitted for: Dyspnea on exertion.    Concerns with insurance coverage for discharge needs: None.  Current Living Situation: Patient lives with spouse and was just in [facility].  Support System: Supportive and Involved  Services Involved: Home Care  Transportation: Family or Friend will provide  Barriers to Discharge: chronically ill    Coordination of Care and Referrals: Provided patient/family with options for Home Care.        Assessment  Spoke with patient at bedside.  She was recently discharged to [facility].  Patient wishes to discharge home and states she was going to be discharging next week from the TCU back home.  Patient had been open with [facility] for skilled nursing and wishes to continue.  Therapy saw patient and recommend home safety evaluation and continued use of her walker.  Resumption orders placed.  Patient's  will provide transportation home.  Patient currently on oxygen.  Will continue to follow for needs.    Addendum 9/15/17: Was notified by bedside nurse patient required oxygen with her CPAP.  Spoke with [facility].  Patient will not qualify for O2 with her CPAP unless she requires O2 with activity.  Currently, patient is on room air at rest with saturations low-mid 90's per nursing notes.  Patient would need a formal sleep study again with in lab titration.  A referral could be placed for this.  MD updated.    Addendum 9/15/17 1500: Patient qualifies for home O2 with sats down to 88% on room air at rest.  Order placed and referral called to [facility] to arrange.  Bedside nurse updated that portable O2 likely will take longer than 30 minutes to arrive and patient wanted to discharge at 1530.    _______________________  [facility]  Phone  [phone]  Fax   [phone]  ______________________       Plan  Anticipated Discharge Date:  today  Anticipated Discharge Plan:  Home with [facility]    Sindy Rodriguez RN, BSN  Care Coordinator [facility]  Pager: [phone]  Phone: [phone]

## 2017-09-15 NOTE — DISCHARGE INSTRUCTIONS
_______________________  [facility]  Phone  [phone]  Fax  [phone]  ______________________    Oxygen Provider:  Arranged through [employer], contact number [phone]. Follow up call will occur within 3 business days, where home visit needs will be assessed. If you have any questions or concerns please call the oxygen company directly.

## 2017-09-15 NOTE — PLAN OF CARE
"Problem: Discharge Planning  Goal: Discharge Planning (Adult, OB, Behavioral, Peds)  /59  Pulse 86  Temp 98.7  F (37.1  C) (Oral)  Resp 16  Ht 1.626 m (5' 4\")  Wt 76.1 kg (167 lb 11.2 oz)  SpO2 92%  Breastfeeding? No  BMI 28.79 kg/m2     -diagnostic tests and consults completed and resulted : YES  -vital signs normal or at patient baseline : YES  -dyspnea improved and O2 sats greater than 88% on room air or prior home oxygen levels : Currently monitoring patient's oxygen saturation level on room air.  Sating between 88%-93% on room air at rest while eating lunch.  Will do ambulation trial off oxygen as well, and document results.    Nurse to notify provider when observation goals have been met and patient is ready for discharge      "

## 2017-09-15 NOTE — PROGRESS NOTES
[facility], [place]    Internal Medicine Progress Note - [facility] Service    Main Plans for Today   PT and OT evaluation  Diuresis with IV lasix    Assessment & Plan   Kathryn Banks is a 77 year old female admitted on 9/13/2017. 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 Also found to have c. Diff diarrhea. Pt is coming from TCU and would like to discharge home from here (was previously planning to go home on Tuesday).      # Mild acute on chronic diastolic heart failure exacerbation with preserved ejection fraction  # CKD  Patient weight up 6kg over past 10 days with mild increase in SOB.  Would benefit from brief course of IV diuresis and ongoing adjustment of outpatient diuretic regimen. Lasix given x3 at 40-mg IV.  Was adjusted to torsemide 40 mg QAM and 20mg QPM on 9/12; weight was down 1lb as of 9/13 but too early to tell if this outpatient regimen is effective. Follows with Dr. Barfield as outpatient for CKD, creatinine slightly improved from baseline on admission.  - Strict I/O  - Daily weights  - AM BMP  - Continue PTA amlodipine, ASA, metoprolol, pravastatin  - Further diuresis with [facility]    # Mild clostridium difficile diarrhea  Significant diarrhea but hemodynamically stable, no WBC count.   - Flagyl 500mg TID 9/14 - 9/28    # L pleural effusion  Mild increase from 8/31 on imaging.  Suspect SOB is secondary to generalized volume overload; would expect to resolve with better control of CHF.  If pleural effusion persists following more effective diuresis would consider further workup.     # Chronic lower extremity venous ulcers   Mild improvement from previous admission.  Is planned  for follow up with dermatology and vascular surgery.  - WOC consult     Chronic medical problems:  # Asymptomatic chronic hypocalcemia - continue PTA calcitriol, calcium carbonate  # Hx afib w/RVR - continue PTA warfarin, metoprolol     Diet: 2 Gram Sodium Diet  Fluid restriction 1500 ML FLUID  Fluids: Diuresing  DVT Prophylaxis: Warfarin  Code Status: Full Code    Disposition Plan   Expected discharge: Tomorrow, recommended to home once shortness of breath improves.     Entered: Neema Marr 09/14/2017, 7:10 PM   Information in the above section will display in the discharge planner report.      The patient's care was discussed with the Attending Physician, Dr. Chris Irene.    Neema Marr  [facility]  Pager: [phone]  Please see sticky note for cross cover information    Interval History   Pt admitted overnight for diuresis. Called this AM regarding significant diarrhea with standing. C. Diff PCR sent and pos so started on flagyl. Breathing is somewhat improved though not at baseline. Pt would like to discharge to home rather than her prior TCU so she worked with PT and OT for assessment. She is still requiring O2 and may need it at discharge. Will reevaluate in the AM after further diuresis.     Physical Exam   Vital Signs: Temp: 98.2  F (36.8  C) Temp src: Oral BP: 131/78 Pulse: 80   Resp: 16 SpO2: 98 % O2 Device: None (Room air) Oxygen Delivery: 4 LPM  Weight: 164 lbs 0 oz  General Appearance: Elderly, chronically ill appearing, no acute distress  Respiratory: Bibasilar crackles, no wheezes  Cardiovascular: rrr no m/r/g  GI: malodorous diarrhea  Skin: see WOC note for photos    Data   Meds, vitals, labs, imaging reviewed

## 2017-09-15 NOTE — PROGRESS NOTES
SUBJECTIVE:                                                    Kathryn Banks is a 77 year old female who presents to clinic today for the following health issues:  [facility]:    Hospital/Nursing Home/IP Rehab Facility: [facility]  Date of Admission: 09/13/17  Date of Discharge: 09/15/17  Reason(s) for Admission: C. Diff colitis  Subacute respiratory failure 2/2 pulmonary edema  HFpEF            Problems taking medications regularly:  None       Medication changes since discharge: Trental, aranesept iron       Problems adhering to non-medication therapy:  None    Summary of hospitalization:  [facility] discharge summary reviewed  Diagnostic Tests/Treatments reviewed.  Follow up needed: I have advised that she continue follow-up with the [facility] down at the [facility] as well as with nephrology. Evidently she initially wanted to establish care with me but I advised her that continuing to see Dr. Dheeraj Jj from internal medicine specialist in [place] would be in her best interests.  Other Healthcare Providers Involved in Patient s Care:         None  Update since discharge: improved.     Post Discharge Medication Reconciliation: discharge medications reconciled, continue medications without change.  Plan of care communicated with patient     Coding guidelines for this visit:  Type of Medical   Decision Making Face-to-Face Visit       within 7 Days of discharge Face-to-Face Visit        within 14 days of discharge   Moderate Complexity [id number]   High Complexity [id number]              Problem list and histories reviewed & adjusted, as indicated.  Additional history: as documented    Patient Active Problem List   Diagnosis     Restless leg syndrome     Sleep apnea     Lipoma of other skin and subcutaneous tissue     ESOPHAGEAL REFLUX     Postsurgical aortocoronary bypass status     Iron deficiency  anemia     History of peptic ulcer disease     Edema     Kidney stone     Hyperlipidemia LDL goal <100     Advanced directives, counseling/discussion     CAD - NSTEMI, CABG x 5 2007, angio in 2013 with patent grafts other than occluded graft to PL     Hx of non-ST elevation myocardial infarction (NSTEMI)     Health Care Home     Lymphedema     Anemia of chronic renal failure, stage 4 (severe) (H)     Renal failure     Osteoarthritis of hip     Non morbid obesity, unspecified obesity type     Type 2 diabetes mellitus with other circulatory complications (H)     Long-term (current) use of anticoagulants [Z79.01]     Acute renal failure with tubular necrosis (H)     Essential hypertension with goal blood pressure less than 140/90     Atrial fibrillation with rapid ventricular response (H)     Rash - redness medial thighs     Metabolic acidosis, normal anion gap (NAG)     Pulmonary hypertension (H)     Chronic heart failure (H) with preserved EF     Atrial flutter (H) - present 6/12     Generalized edema - anasarca     Hypertensive heart and chronic kidney disease with heart failure and stage 1 through stage 4 chronic kidney disease, or unspecified chronic kidney disease (H)     Supratherapeutic INR     Proteinuria 2.1 g/g Cr     Bradycardia     Acute on chronic renal failure (H)     Memory loss     Chronic atrial fibrillation (H) on 6/12-6/13     Blood in stool     Microscopic hematuria     Acute kidney injury (nontraumatic) (H)     CHF (congestive heart failure) (H)     Anemia     Anemia, iron deficiency     Acidosis     Pulmonary edema     Volume overload     CKD (chronic kidney disease) stage 5, GFR less than 15 ml/min (H)     Anemia in stage 5 chronic kidney disease (H)     Past Surgical History:   Procedure Laterality Date     ANGIOPLASTY       C APPENDECTOMY       C CABG, VEIN, FIVE  12/06    SVG x 4 and LIMA to LAD     C SOTO W/O FACETEC FORAMOT/DSKC 1/2 VRT SEG, LUMBAR  1968     C REMV CATARACT INTRACAP,INSERT  "LENS      Bilateral     C TOTAL ABDOM HYSTERECTOMY       - fibroids     C VAGOTOMY/PYLOROPLASTY,SELECT  1970     COLONOSCOPY  12/3/2007     COLONOSCOPY  2014    Procedure: COLONOSCOPY;  Colonoscopy;  Surgeon: Zack Stearns MD;  Location:  GI     CYSTOSCOPY  09    [facility]     ENDOSCOPY  2000    Upper GI     HC COLONOSCOPY THRU STOMA, DIAGNOSTIC  10/7/04    poor prep, repeat in 2-3 years     HC COLONOSCOPY W/WO BRUSH/WASH  10/31/07    Repeat-poor quality prep     HC REMOVAL OF OVARY/TUBE(S)      Salpingo-Oophorectomy, Unilateral     HC REVISE MEDIAN N/CARPAL TUNNEL SURG      Carpal tunnel release     HC UGI ENDOSCOPY DIAG W BIOPSY  10/31/07     HC UGI ENDOSCOPY, SIMPLE EXAM  12/3/2007     OPEN REDUCTION INTERNAL FIXATION HIP NAILING Left 7/3/2016    Procedure: OPEN REDUCTION INTERNAL FIXATION HIP NAILING;  Surgeon: Lake Schulz MD;  Location:  OR       Social History   Substance Use Topics     Smoking status: Former Smoker     Years: 10.00     Quit date: 1969     Smokeless tobacco: Never Used     Alcohol use 0.0 oz/week     0 Standard drinks or equivalent per week      Comment: occasional- 2 drinks per month     Family History   Problem Relation Age of Onset     DIABETES Father      AODM     Neurologic Disorder Father      Parkinson's     Blood Disease Father       from blood clot from leg to lung immediately after hip fracture     DIABETES Paternal Grandmother      adult onset     DIABETES Maternal Grandmother      adult onset     Hypertension No family hx of      CEREBROVASCULAR DISEASE No family hx of              ROS:  Constitutional, HEENT, cardiovascular, pulmonary, gi and gu systems are negative, except as otherwise noted.      OBJECTIVE:   /64 (Cuff Size: Adult Regular)  Pulse 90  Temp 98.4  F (36.9  C) (Temporal)  Resp 16  Ht 5' 4\" (1.626 m)  Wt 168 lb (76.2 kg)  SpO2 99%  BMI 28.84 kg/m2  Body mass index is 28.84 kg/(m^2).  GENERAL: healthy, alert " and no distress  NECK: no adenopathy, no asymmetry, masses, or scars and thyroid normal to palpation  RESP: lungs clear to auscultation - no rales, rhonchi or wheezes  CV: regular rate and rhythm, normal S1 S2, no S3 or S4, no murmur, click or rub, no peripheral edema and peripheral pulses strong  ABDOMEN: soft, nontender, no hepatosplenomegaly, no masses and bowel sounds normal  MS: no gross musculoskeletal defects noted, no edema    Diagnostic Test Results:  Results for orders placed or performed in visit on 09/19/17   Renal panel (Alb, BUN, Ca, Cl, CO2, Creat, Gluc, Phos, K, Na)   Result Value Ref Range    Sodium 147 (H) 133 - 144 mmol/L    Potassium 4.5 3.4 - 5.3 mmol/L    Chloride 115 (H) 94 - 109 mmol/L    Carbon Dioxide 20 20 - 32 mmol/L    Anion Gap 12 3 - 14 mmol/L    Glucose 134 (H) 70 - 99 mg/dL    Urea Nitrogen 49 (H) 7 - 30 mg/dL    Creatinine 4.53 (H) 0.52 - 1.04 mg/dL    GFR Estimate 9 (L) >60 mL/min/1.7m2    GFR Estimate If Black 11 (L) >60 mL/min/1.7m2    Calcium 6.9 (L) 8.5 - 10.1 mg/dL    Phosphorus 5.6 (H) 2.5 - 4.5 mg/dL    Albumin 2.2 (L) 3.4 - 5.0 g/dL   Hemoglobin and hematocrit   Result Value Ref Range    Hemoglobin 7.8 (L) 11.7 - 15.7 g/dL    Hematocrit 25.0 (L) 35.0 - 47.0 %   Iron and iron binding capacity   Result Value Ref Range    Iron 45 35 - 180 ug/dL    Iron Binding Cap 140 (L) 240 - 430 ug/dL    Iron Saturation Index 32 15 - 46 %   Ferritin   Result Value Ref Range    Ferritin 258 (H) 8 - 252 ng/mL   Protein  random urine with Creat Ratio   Result Value Ref Range    Protein Random Urine 0.20 g/L    Protein Total Urine g/gr Creatinine 1.16 (H) 0 - 0.2 g/g Cr   Parathyroid Hormone Intact   Result Value Ref Range    Parathyroid Hormone Intact 685 (H) 12 - 72 pg/mL   Creatinine urine calculation only   Result Value Ref Range    Creatinine Urine 17 mg/dL     *Note: Due to a large number of results and/or encounters for the requested time period, some results have not been displayed. A  complete set of results can be found in Results Review.       ASSESSMENT/PLAN:     1. Pulmonary hypertension (H)  2. Chronic pulmonary edema  3. Hyperlipidemia LDL goal <100  4. Type 2 diabetes mellitus with other circulatory complications (H)  Due for related labs. Recheck in 3 months.  - Lipid panel reflex to direct LDL; Future    5. Other hypervolemia  Historically noted in currently in good control though we do note renal dysfunction present that was closely followed by specialists.    6. Metabolic acidosis, normal anion gap (NAG)  Reason for admission amongst others.    7. Atrial fibrillation with rapid ventricular response (H)  Stable at this point in time no new concerns on her part follow-up in 3 months with PCP of choice.    8. Essential hypertension with goal blood pressure less than 140/90  Stable at this point in time.  No new changes at this point. Follow-up with PCP    9. Hypertensive heart and chronic kidney disease with heart failure and stage 1 through stage 4 chronic kidney disease, or unspecified chronic kidney disease (H)  Noted of concern at this point in time with a GFR about 9. She is to follow with specialists and has these appointments arranged. She is given after visit summary and highlight the areas of concern related to need of follow-up.    She is to make a follow-up appointment with her PCP internal medicine specialist Dheeraj Jj in [place].    Jovi Parker PA-C  [facility]

## 2017-09-15 NOTE — PLAN OF CARE
Problem: Goal Outcome Summary  Goal: Goal Outcome Summary  OT-6D: Defer. Per discussion with PT and chart review, pt is independent with self cares/ADLs or has assistance to complete cares. Pt was at TCU and was near discharge from therapies prior to admission. Will complete OT order. PT recommending discharge Home with A and HHPT.

## 2017-09-15 NOTE — PROGRESS NOTES
Patient has been assessed for Home Oxygen needs. Oxygen readings:    *Pulse oximetry (SpO2) = 88% on room air at rest while awake.    *SpO2 improved to 98% on 4 liters/minute at rest.    *SpO2 = 89% on room air during activity/with exercise.    *SpO2 improved to 93% on 2 liters/minute during activity/with exercise.

## 2017-09-15 NOTE — PLAN OF CARE
Problem: Discharge Planning  Goal: Discharge Planning (Adult, OB, Behavioral, Peds)  -Diagnostic tests and consults completed and resulted: No   -Vital signs normal or at patient baseline: Yes   -Dyspnea improved and O2 sats greater than 88% on room air or prior home oxygen levels: Pending Patient continues to require oxygen when sleeping.

## 2017-09-15 NOTE — PLAN OF CARE
Problem: Discharge Planning  Goal: Discharge Planning (Adult, OB, Behavioral, Peds)  -Diagnostic tests and consults completed and resulted: No, attempting to wean off oxygen, will monitor pt on RA overnight.   -Vital signs normal or at patient baseline: Yes   -Dyspnea improved and O2 sats greater than 88% on room air or prior home oxygen levels: Pending Patient continues to require oxygen when sleeping.

## 2017-09-15 NOTE — PLAN OF CARE
Problem: Discharge Planning  Goal: Discharge Planning (Adult, OB, Behavioral, Peds)  -Diagnostic tests and consults completed and resulted: No, attempting to wean off oxygen, will monitor pt on RA overnight.   -Vital signs normal or at patient baseline: Yes   -Dyspnea improved and O2 sats greater than 88% on room air or prior home oxygen levels: Pending.

## 2017-09-15 NOTE — PHARMACY-ANTICOAGULATION SERVICE
Clinical Pharmacy- Warfarin Discharge Note  This patient is currently on warfarin for the treatment of Atrial fibrillation.  INR Goal= 2-3  Expected length of therapy lifetime.    Date 9/1/17 9/2/17 9/3/17 9/4/17 9/5/17 9/6/17 9/7/17 9/8/17 9/9/17 9/10/17 9/11/17 9/12/17 9/13/17 9/14/17 9/15/17   INR      2.11       3.18  3.66   Warfarin dose (mg) 2.5mg 2.5mg 5mg 7.5mg 7.5mg 5mg 2.5mg ? 2mg 4mg HELD 2mg 1.5mg 1.5mg HOLD     Vitamin K doses administered during the last 7 days: none  FFP administered during the last 7 days: none  The patient was started on metronidazole on 9/14/17, which will increase INR due to decreased warfarin metabolism. Given this drug interaction recommend discharging the patient with the following warfarin plan: 9/15/17: HOLD dose; 9/16/17: HOLD dose; 9/17/17 take 2mg.  Recommend discharge patient with 2mg warfarin tabs.     The patient should have an INR checked 9/18/17.    Ramila Travis, Pharm.D., [facility]  Pager [phone]

## 2017-09-15 NOTE — PROGRESS NOTES
Patient discharged from unit 6D at 4:37 PM.  Went over discharge paperwork and discharge instructions with patient who verbalized understanding.  Patient's IV discontinued.  Patient discharged from unit via wheelchair transport, to return home with , to return home via car.

## 2017-09-18 ENCOUNTER — TELEPHONE (OUTPATIENT)
Dept: FAMILY MEDICINE | Facility: CLINIC | Age: 77
End: 2017-09-18

## 2017-09-18 ENCOUNTER — ANTICOAGULATION THERAPY VISIT (OUTPATIENT)
Dept: INTERNAL MEDICINE | Facility: CLINIC | Age: 77
End: 2017-09-18

## 2017-09-18 DIAGNOSIS — Z79.01 LONG-TERM (CURRENT) USE OF ANTICOAGULANTS: ICD-10-CM

## 2017-09-18 LAB — INR PPP: 1.6

## 2017-09-18 NOTE — PROGRESS NOTES
ANTICOAGULATION FOLLOW-UP CLINIC VISIT    Patient Name:  Kathryn Banks  Date:  9/18/2017  Contact Type:  Telephone/ [facility] nurse    SUBJECTIVE:     Patient Findings     Positives Change in medications (Changed from lasix to torsemide), Hospital admission (Was hospitalized 9/12-9/15 and nursing home month prior to hospitaliztion), Antibiotic use or infection (Taking Flagyl), Intentional hold of therapy (Dose held Mon, Wed, Fri, Sat)    Comments S/O:  Janine from  home care calls with pt's INR today of 1.6.  Kathryn Banks is on Coumadin for A. Fib.  Current Coumadin dose is Mon 0 mg, Tue 2 mg, Wed 0 mg, Thurs 1.5 mg, Fri 0 mg, Sat 0 mg and Sun 2 mg=5.5.   Concerns today are: Pt was discharged from hospital on 9/15, was in LTC prior.    A/P: Kathryn Dunns INR is Subtherapeutic  for his/her goal range of 2 - 3.  Reasons why INR is out of range may include:Held doses, med changes.  Recommended to have Kathryn Banks increase Coumadin dose to 2 mg Mon, 1 mg Tues and to have his/her INR rechecked in 2 days on 9/20.      Yovani Ruvalcaba RN, BSN               OBJECTIVE    INR   Date Value Ref Range Status   09/18/2017 1.6  Final       ASSESSMENT / PLAN  INR assessment SUB    Recheck INR In: 2 DAYS    INR Location Homecare INR      Anticoagulation Summary as of 9/18/2017     INR goal 2.0-3.0   Today's INR 1.6!   Maintenance plan No maintenance plan   Full instructions 9/18: 2 mg; 9/19: 1 mg   Plan last modified Nicki Calvillo, RN (7/17/2017)   Next INR check 9/20/2017   Target end date     Indications   Long-term (current) use of anticoagulants [Z79.01] [Z79.01]  Atrial fibrillation (H) (Resolved) [I48.91]  Paroxysmal atrial fibrillation (H) (Resolved) [I48.0]         Anticoagulation Episode Summary     INR check location     Preferred lab     Send INR reminders to [facility]I    Comments 2.5 mg tablets, pm dose. Alere home monitor      Anticoagulation Care Providers     Provider Role Specialty Phone number     Dheeraj Jj MD [facility] Internal Medicine [phone]            See the Encounter Report to view Anticoagulation Flowsheet and Dosing Calendar (Go to Encounters tab in chart review, and find the Anticoagulation Therapy Visit)    Dosage adjustment made based on physician directed care plan.    Yovani Ruvalcaba RN

## 2017-09-18 NOTE — TELEPHONE ENCOUNTER
Reason for Call:  INR    Who is calling?  Home Care:  [facility]     Phone number: [phone]     Fax number:      Name of caller: Josephine     INR Value:  1.6   Are there any other concerns:  No    Can we leave a detailed message on this number? Yes      Phone number patient can be reached at: Other phone number:        Call taken on 9/18/2017 at 1:03 PM by Crystal Hernandez

## 2017-09-18 NOTE — MR AVS SNAPSHOT
Kathryn MICKY Banks   9/18/2017   Anticoagulation Therapy Visit    Description:  77 year old female   Provider:  Dheeraj Jj MD   Department:  Ph Internal Med           INR as of 9/18/2017     Today's INR 1.6!      Anticoagulation Summary as of 9/18/2017     INR goal 2.0-3.0   Today's INR 1.6!   Full instructions 9/18: 2 mg; 9/19: 1 mg   Next INR check 9/20/2017    Indications   Long-term (current) use of anticoagulants [Z79.01] [Z79.01]  Atrial fibrillation (H) (Resolved) [I48.91]  Paroxysmal atrial fibrillation (H) (Resolved) [I48.0]         September 2017 Details    Sun Mon Tue Wed Thu Fri Sat          1               2                 3               4               5               6               7               8               9                 10               11               12               13               14               15               16                 17               18      2 mg   See details      19      1 mg         20            21               22               23                 24               25               26               27               28               29               30                Date Details   09/18 This INR check       Date of next INR:  9/20/2017         How to take your warfarin dose     To take:  1 mg Take 1 of the 1 mg tablets.    To take:  2 mg Take 2 of the 1 mg tablets.

## 2017-09-19 ENCOUNTER — TELEPHONE (OUTPATIENT)
Dept: PHARMACY | Facility: CLINIC | Age: 77
End: 2017-09-19

## 2017-09-19 ENCOUNTER — TELEPHONE (OUTPATIENT)
Dept: NEPHROLOGY | Facility: CLINIC | Age: 77
End: 2017-09-19

## 2017-09-19 ENCOUNTER — OFFICE VISIT (OUTPATIENT)
Dept: NEPHROLOGY | Facility: CLINIC | Age: 77
End: 2017-09-19
Payer: COMMERCIAL

## 2017-09-19 VITALS
TEMPERATURE: 97.5 F | HEART RATE: 95 BPM | DIASTOLIC BLOOD PRESSURE: 71 MMHG | OXYGEN SATURATION: 98 % | SYSTOLIC BLOOD PRESSURE: 133 MMHG | WEIGHT: 168.7 LBS | BODY MASS INDEX: 28.96 KG/M2

## 2017-09-19 DIAGNOSIS — D50.9 ANEMIA, IRON DEFICIENCY: ICD-10-CM

## 2017-09-19 DIAGNOSIS — N25.81 SECONDARY RENAL HYPERPARATHYROIDISM (H): Primary | ICD-10-CM

## 2017-09-19 DIAGNOSIS — E87.20 METABOLIC ACIDOSIS, NORMAL ANION GAP (NAG): ICD-10-CM

## 2017-09-19 DIAGNOSIS — D63.1 ANEMIA OF CHRONIC RENAL FAILURE, STAGE 4 (SEVERE) (H): ICD-10-CM

## 2017-09-19 DIAGNOSIS — N18.5 CKD (CHRONIC KIDNEY DISEASE) STAGE 5, GFR LESS THAN 15 ML/MIN (H): ICD-10-CM

## 2017-09-19 DIAGNOSIS — N18.4 ACUTE RENAL FAILURE WITH ACUTE TUBULAR NECROSIS SUPERIMPOSED ON STAGE 4 CHRONIC KIDNEY DISEASE (H): ICD-10-CM

## 2017-09-19 DIAGNOSIS — I10 ESSENTIAL HYPERTENSION WITH GOAL BLOOD PRESSURE LESS THAN 140/90: ICD-10-CM

## 2017-09-19 DIAGNOSIS — N18.5 CKD (CHRONIC KIDNEY DISEASE) STAGE 5, GFR LESS THAN 15 ML/MIN (H): Primary | ICD-10-CM

## 2017-09-19 DIAGNOSIS — D63.1 ANEMIA IN STAGE 5 CHRONIC KIDNEY DISEASE (H): Primary | ICD-10-CM

## 2017-09-19 DIAGNOSIS — N18.4 CKD (CHRONIC KIDNEY DISEASE) STAGE 4, GFR 15-29 ML/MIN (H): ICD-10-CM

## 2017-09-19 DIAGNOSIS — N17.9 ACUTE KIDNEY INJURY (NONTRAUMATIC) (H): ICD-10-CM

## 2017-09-19 DIAGNOSIS — N17.0 ACUTE RENAL FAILURE WITH ACUTE TUBULAR NECROSIS SUPERIMPOSED ON STAGE 4 CHRONIC KIDNEY DISEASE (H): ICD-10-CM

## 2017-09-19 DIAGNOSIS — N18.5 ANEMIA IN STAGE 5 CHRONIC KIDNEY DISEASE (H): Primary | ICD-10-CM

## 2017-09-19 DIAGNOSIS — N18.4 ANEMIA OF CHRONIC RENAL FAILURE, STAGE 4 (SEVERE) (H): ICD-10-CM

## 2017-09-19 DIAGNOSIS — I50.32 CHRONIC DIASTOLIC HEART FAILURE (H): ICD-10-CM

## 2017-09-19 LAB
ALBUMIN SERPL-MCNC: 2.2 G/DL (ref 3.4–5)
ANION GAP SERPL CALCULATED.3IONS-SCNC: 12 MMOL/L (ref 3–14)
BUN SERPL-MCNC: 49 MG/DL (ref 7–30)
CALCIUM SERPL-MCNC: 6.9 MG/DL (ref 8.5–10.1)
CHLORIDE SERPL-SCNC: 115 MMOL/L (ref 94–109)
CO2 SERPL-SCNC: 20 MMOL/L (ref 20–32)
CREAT SERPL-MCNC: 4.53 MG/DL (ref 0.52–1.04)
CREAT UR-MCNC: 17 MG/DL
FERRITIN SERPL-MCNC: 258 NG/ML (ref 8–252)
GFR SERPL CREATININE-BSD FRML MDRD: 9 ML/MIN/1.7M2
GLUCOSE SERPL-MCNC: 134 MG/DL (ref 70–99)
HCT VFR BLD AUTO: 25 % (ref 35–47)
HGB BLD-MCNC: 7.8 G/DL (ref 11.7–15.7)
IRON SATN MFR SERPL: 32 % (ref 15–46)
IRON SERPL-MCNC: 45 UG/DL (ref 35–180)
PHOSPHATE SERPL-MCNC: 5.6 MG/DL (ref 2.5–4.5)
POTASSIUM SERPL-SCNC: 4.5 MMOL/L (ref 3.4–5.3)
PROT UR-MCNC: 0.2 G/L
PROT/CREAT 24H UR: 1.16 G/G CR (ref 0–0.2)
PTH-INTACT SERPL-MCNC: 685 PG/ML (ref 12–72)
SODIUM SERPL-SCNC: 147 MMOL/L (ref 133–144)
TIBC SERPL-MCNC: 140 UG/DL (ref 240–430)

## 2017-09-19 PROCEDURE — 85014 HEMATOCRIT: CPT | Performed by: INTERNAL MEDICINE

## 2017-09-19 PROCEDURE — 99215 OFFICE O/P EST HI 40 MIN: CPT | Performed by: INTERNAL MEDICINE

## 2017-09-19 PROCEDURE — 82728 ASSAY OF FERRITIN: CPT | Performed by: INTERNAL MEDICINE

## 2017-09-19 PROCEDURE — 83550 IRON BINDING TEST: CPT | Performed by: INTERNAL MEDICINE

## 2017-09-19 PROCEDURE — 85018 HEMOGLOBIN: CPT | Performed by: INTERNAL MEDICINE

## 2017-09-19 PROCEDURE — 36415 COLL VENOUS BLD VENIPUNCTURE: CPT | Performed by: INTERNAL MEDICINE

## 2017-09-19 PROCEDURE — 80069 RENAL FUNCTION PANEL: CPT | Performed by: INTERNAL MEDICINE

## 2017-09-19 PROCEDURE — 83970 ASSAY OF PARATHORMONE: CPT | Performed by: INTERNAL MEDICINE

## 2017-09-19 PROCEDURE — 83540 ASSAY OF IRON: CPT | Performed by: INTERNAL MEDICINE

## 2017-09-19 PROCEDURE — 84156 ASSAY OF PROTEIN URINE: CPT | Performed by: INTERNAL MEDICINE

## 2017-09-19 NOTE — PATIENT INSTRUCTIONS
1. Take the phoslo three times a day with meals - ok to take 1 tab with breakfast and lunch but take 2 with dinner.  2. Yesy Samaniego from anemia services will be in touch regarding starting EPO to help boost your blood level  3. We will set up a tour of the [facility] and try to see if there is education through their unit  4. We will arrange a vascular referral to discuss getting an arm access placed in the near future.   5. Labs in 2 weeks  6. Follow-up in one month  7. If you have worsening symptoms of kidney failure, please call us during the week days hours or if any question after hours or weekend, please call nephrology on call at [phone].

## 2017-09-19 NOTE — MR AVS SNAPSHOT
After Visit Summary   9/19/2017    Kathryn Banks    MRN: [id number]           Patient Information     Date Of Birth          1940        Visit Information        Provider Department      9/19/2017 12:00 PM Vibha Barfield MD [facility]        Care Instructions    1. Take the phoslo three times a day with meals - ok to take 1 tab with breakfast and lunch but take 2 with dinner.  2. Yesy Samaniego from anemia services will be in touch regarding starting EPO to help boost your blood level  3. We will set up a tour of the [facility] and try to see if there is education through their unit  4. We will arrange a vascular referral to discuss getting an arm access placed in the near future.   5. Labs in 2 weeks  6. Follow-up in one month  7. If you have worsening symptoms of kidney failure, please call us during the week days hours or if any question after hours or weekend, please call nephrology on call at [phone].               Follow-ups after your visit        Your next 10 appointments already scheduled     Sep 21, 2017  1:40 PM CDT   [facility]    [address] [place] [id number] [phone]           Bring a current list of meds and any records pertaining to this visit. For Physicals, please bring immunization records and any forms needing to be filled out. Please arrive 10 minutes early to complete paperwork.            Sep 22, 2017  7:30 AM CDT   Lab with  LAB   [facility] ([facility])    [address] [place] [id number] [phone]            Sep 22, 2017  8:00 AM CDT   (Arrive by 7:45 AM)   [facility] ([facility])    [address] [place] [address] [phone]            Oct 04, 2017 11:00  AM CDT   LAB with NL LAB [facility] ([facility])    [address] [facility] [address] [phone]           Patient must bring picture ID. Patient should be prepared to give a urine specimen  Please do not eat 10-12 hours before your appointment if you are coming in fasting for labs on lipids, cholesterol, or glucose (sugar). Pregnant women should follow their Care Team instructions. Water with medications is okay. Do not drink coffee or other fluids. If you have concerns about taking  your medications, please ask at office or if scheduling via Jimmy [employer]ly, send a message by clicking on Secure Messaging, Message Your Care Team.            Nov 20, 2017 12:20 PM CST   LAB with LAB FIRST FLOOR [facility] ([facility])    [address] [place] [address] [phone]           Patient must bring picture ID. Patient should be prepared to give a urine specimen  Please do not eat 10-12 hours before your appointment if you are coming in fasting for labs on lipids, cholesterol, or glucose (sugar). Pregnant women should follow their Care Team instructions. Water with medications is okay. Do not drink coffee or other fluids. If you have concerns about taking  your medications, please ask at office or if scheduling via [employer], send a message by clicking on Secure Messaging, Message Your Care Team.            Nov 20, 2017  1:30 PM CST   Return Visit with Vibha Barfield MD   [facility] ([facility]    [address] [place] [address] [phone]              Who to contact     If you have questions or need follow up information about today's clinic visit or your schedule please contact [facility] directly at [phone].  Normal or non-critical lab and imaging results will be communicated to you by MyChart, letter or phone within 4 business days  after the clinic has received the results. If you do not hear from us within 7 days, please contact the clinic through TRAKLOK or phone. If you have a critical or abnormal lab result, we will notify you by phone as soon as possible.  Submit refill requests through TRAKLOK or call your pharmacy and they will forward the refill request to us. Please allow 3 business days for your refill to be completed.          Additional Information About Your Visit        TRAKLOK Information     TRAKLOK is an electronic gateway that provides easy, online access to your medical records. With TRAKLOK, you can request a clinic appointment, read your test results, renew a prescription or communicate with your care team.     To sign up for TRAKLOK visit the website at [email or web address]   You will be asked to enter the access code listed below, as well as some personal information. Please follow the directions to create your username and password.     Your access code is: [email or web address]  Expires: 2017  4:04 PM     Your access code will  in 90 days. If you need help or a new code, please contact your [facility] or call [phone] for assistance.        Care EveryWhere ID     This is your Care EveryWhere ID. This could be used by other organizations to access your [facility] medical records  [id number]        Your Vitals Were     Pulse Temperature Pulse Oximetry BMI (Body Mass Index)          95 97.5  F (36.4  C) (Oral) 98% 28.96 kg/m2         Blood Pressure from Last 3 Encounters:   17 133/71   17 119/59   17 149/79    Weight from Last 3 Encounters:   17 76.5 kg (168 lb 11.2 oz)   09/15/17 76.1 kg (167 lb 11.2 oz)   17 78.7 kg (173 lb 6.4 oz)              Today, you had the following     No orders found for display       Primary Care Provider Office Phone # Fax #    Dheeraj Jj [phone]       [address]  [place] [address]        Equal Access to Services     [facility]: Hadii pascual dee juaquinvenessa Sodory, waaxda luqadaha, qaybta kaalmatay benoit. So [facility] [phone].    ATENCIÓN: Si habla español, tiene a medrano disposición servicios gratuitos de asistencia lingüística. Llame al [phone].    We comply with applicable federal civil rights laws and [place] laws. We do not discriminate on the basis of race, color, national origin, age, disability sex, sexual orientation or gender identity.            Thank you!     Thank you for choosing [facility]  for your care. Our goal is always to provide you with excellent care. Hearing back from our patients is one way we can continue to improve our services. Please take a few minutes to complete the written survey that you may receive in the mail after your visit with us. Thank you!             Your Updated Medication List - Protect others around you: Learn how to safely use, store and throw away your medicines at www.disposemymeds.org.          This list is accurate as of: 9/19/17  1:12 PM.  Always use your most recent med list.                   Brand Name Dispense Instructions for use Diagnosis    ACETAMINOPHEN PO      Take 650 mg by mouth every 4 hours as needed for pain For pain 1-5 out of 10        acidophilus tablet      Take 1 tablet by mouth daily    Urinary tract infection without hematuria, site unspecified, Cellulitis of lower extremity, unspecified laterality       amLODIPine 5 MG tablet    NORVASC    30 tablet    Take 2 tablets (10 mg) by mouth daily    Benign essential hypertension       aspirin 81 MG tablet     30 tablet    Take 1 tablet (81 mg) by mouth daily    Coronary artery disease involving native coronary artery of native heart without angina pectoris       calcitRIOL 0.5 MCG capsule    ROCALTROL     Take 1 capsule (0.5 mcg) by mouth daily    Constitutional chronic hypocalcemia        calcium acetate 667 MG Caps capsule    PHOSLO    180 capsule    Take 1 capsule (667 mg) by mouth 3 times daily (with meals)    Chronic kidney disease, unspecified CKD stage       Calcium Carb-Cholecalciferol 500-400 MG-UNIT Tabs      Take 1 tablet by mouth 2 times daily        COUMADIN PO      Take by mouth daily Take as directed per INR results        cyanocobalamin 1000 MCG/ML injection    VITAMIN B12     Inject 1 mL into the muscle every 30 days        ferrous sulfate 325 (65 FE) MG tablet    IRON     Take 325 mg by mouth 2 times daily        fluticasone 50 MCG/ACT spray    FLONASE    1 Bottle    Spray 1 spray into both nostrils daily    Nasal congestion       gabapentin 300 MG capsule    NEURONTIN    90 capsule    Take 1 capsule (300 mg) by mouth At Bedtime    Diabetic polyneuropathy associated with type 2 diabetes mellitus (H)       guaiFENesin-dextromethorphan 100-10 MG/5ML syrup    ROBITUSSIN DM    560 mL    Take 5 mLs by mouth every 8 hours as needed for cough or congestion    Cough       ipratropium - albuterol 0.5 mg/2.5 mg/3 mL 0.5-2.5 (3) MG/3ML neb solution    DUONEB     Take 1 vial by nebulization 3 times daily        metoprolol 50 MG tablet    LOPRESSOR    180 tablet    Take 0.5 tablets (25 mg) by mouth 2 times daily    Atrial fibrillation with rapid ventricular response (H)       metroNIDAZOLE 500 MG tablet    FLAGYL    42 tablet    Take 1 tablet (500 mg) by mouth every 8 hours    Colitis due to Clostridium difficile       nitroGLYcerin 0.4 MG sublingual tablet    NITROSTAT    25 tablet    Place 1 tablet (0.4 mg) under the tongue every 5 minutes as needed for chest pain    Hx of non-ST elevation myocardial infarction (NSTEMI), Atherosclerosis of native coronary artery of native heart without angina pectoris, Postsurgical aortocoronary bypass status       order for DME     1 Device    Equipment being ordered: cpap machine, mask, humidifier, tubing and filters    ANABEL (obstructive sleep apnea)        pentoxifylline 400 MG CR tablet    TRENtal     Take 1 tablet (400 mg) by mouth daily    Venous stasis ulcer (H)       pramipexole 0.125 MG tablet    MIRAPEX    90 tablet    Take 1 tablet (0.125 mg) by mouth 3 times daily    Restless leg syndrome       pravastatin 40 MG tablet    PRAVACHOL    90 tablet    Take 1 tablet (40 mg) by mouth daily    Hx of non-ST elevation myocardial infarction (NSTEMI), Atherosclerosis of native coronary artery of native heart without angina pectoris, Type 2 diabetes mellitus with other circulatory complications (H)       sodium bicarbonate 650 MG tablet      Take 2 tablets (1,300 mg) by mouth 3 times daily    Chronic kidney disease, unspecified CKD stage       torsemide 20 MG tablet    DEMADEX    30 tablet    Take 2 tablets (40 mg) by mouth 2 times daily    Chronic diastolic congestive heart failure (H)       TRAMADOL HCL PO      Take 50 mg by mouth every 6 hours as needed for moderate to severe pain Pain of 6-10 out of 10

## 2017-09-19 NOTE — TELEPHONE ENCOUNTER
Patient was seen by Dr. Barfield today. Dialysis planning discussed. Dr. Barfield advised the following.  - Vascular Surgery Referral to discuss AV fistula placement- referral sent for vascular surgery to Lisandra Cardenas.  - Tour of [facility] license of [facility]- attempted to call to schedule though unit closed T, Th. Will plan to contact dialysis center tomorrow hoping to schedule patient for Friday tour.    Arlet Webb, RN, BSN  Nephrology Care Coordinator  [facility]

## 2017-09-19 NOTE — LETTER
Ms.Sharon MICKY Banks  [address] [address] [facility] [address]        September 21, 2017    Dear ,    We are writing to inform you of your test results.    Resulted Orders   Renal panel (Alb, BUN, Ca, Cl, CO2, Creat, Gluc, Phos, K, Na)   Result Value Ref Range    Sodium 147 (H) 133 - 144 mmol/L    Potassium 4.5 3.4 - 5.3 mmol/L    Chloride 115 (H) 94 - 109 mmol/L    Carbon Dioxide 20 20 - 32 mmol/L    Anion Gap 12 3 - 14 mmol/L    Glucose 134 (H) 70 - 99 mg/dL      Comment:      Non Fasting    Urea Nitrogen 49 (H) 7 - 30 mg/dL    Creatinine 4.53 (H) 0.52 - 1.04 mg/dL    GFR Estimate 9 (L) >60 mL/min/1.7m2      Comment:      Non  GFR Calc    GFR Estimate If Black 11 (L) >60 mL/min/1.7m2      Comment:       GFR Calc    Calcium 6.9 (L) 8.5 - 10.1 mg/dL    Phosphorus 5.6 (H) 2.5 - 4.5 mg/dL    Albumin 2.2 (L) 3.4 - 5.0 g/dL   Hemoglobin and hematocrit   Result Value Ref Range    Hemoglobin 7.8 (L) 11.7 - 15.7 g/dL      Comment:      Critical Value called to and read back by  [facility] ON 9/19/2017 AT 1213 BY SG      Hematocrit 25.0 (L) 35.0 - 47.0 %   Iron and iron binding capacity   Result Value Ref Range    Iron 45 35 - 180 ug/dL    Iron Binding Cap 140 (L) 240 - 430 ug/dL    Iron Saturation Index 32 15 - 46 %   Ferritin   Result Value Ref Range    Ferritin 258 (H) 8 - 252 ng/mL   Protein  random urine with Creat Ratio   Result Value Ref Range    Protein Random Urine 0.20 g/L    Protein Total Urine g/gr Creatinine 1.16 (H) 0 - 0.2 g/g Cr   Parathyroid Hormone Intact   Result Value Ref Range    Parathyroid Hormone Intact 685 (H) 12 - 72 pg/mL   Creatinine urine calculation only   Result Value Ref Range    Creatinine Urine 17 mg/dL         Labs from 9/19 as we discussed at your visit:     - Your iron level is at goal currently. It would be reasonable to decrease your iron supplement to just once a day at this time.   - Your parathyroid hormone level remains high  - I believe you are currently taking calcitriol 0.5 mg daily and I recommend continuing this for the time being.   - We will focus on improving your phosphorus level with hopes of improving this further.     Other recommendations are as we discussed at your visit. Please call with any questions or concerns.     Sincerely,     Vibha Barfield, DO   dsb

## 2017-09-19 NOTE — NURSING NOTE
"Kathryn Banks's goals for this visit include: CC  She requests these members of her care team be copied on today's visit information: No    PCP: Dheeraj Jj    Referring Provider:  No referring provider defined for this encounter.    Chief Complaint   Patient presents with     RECHECK       Initial Wt 76.5 kg (168 lb 11.2 oz)  BMI 28.96 kg/m2 Estimated body mass index is 28.96 kg/(m^2) as calculated from the following:    Height as of 9/13/17: 1.626 m (5' 4\").    Weight as of this encounter: 76.5 kg (168 lb 11.2 oz).  Medication Reconciliation: complete  "

## 2017-09-20 PROBLEM — D63.1 ANEMIA IN STAGE 5 CHRONIC KIDNEY DISEASE (H): Status: ACTIVE | Noted: 2017-09-20

## 2017-09-20 PROBLEM — N18.5 ANEMIA IN STAGE 5 CHRONIC KIDNEY DISEASE (H): Status: ACTIVE | Noted: 2017-09-20

## 2017-09-20 RX ORDER — FERROUS SULFATE 325(65) MG
325 TABLET ORAL
Qty: 100 TABLET | Refills: 1 | Status: ON HOLD | OUTPATIENT
Start: 2017-09-20 | End: 2017-10-18

## 2017-09-20 NOTE — TELEPHONE ENCOUNTER
Contacted Katrhyn to review lab results and update on dialysis planning.     Spoke to [facility] ( [address] [address]). RNs Helena or Dimitri could provide patient with a tour of the unit on Friday, 9/22. Informed Kathryn that she should ask for the nurses upon her arrival. Provided clinic address to her. Also offered her a tour of the [place] unit to which she stated would be farther for her. If she likes the [place] unit, she will plan to attend there.    Also informed her of lab results and recommendations from Dr. Ford.   - Your iron level is at goal currently. It would be reasonable to decrease your iron supplement to just once a day at this time.   - Your parathyroid hormone level remains high - I believe you are currently taking calcitriol 0.5 mg daily and I recommend continuing this for the time being.   - We will focus on improving your phosphorus level with hopes of improving this further.   A nurse puts together her medications for her. She will plan to decrease the iron to daily. She is unsure if she is on the Calcitriol at this time. She will discuss with her nurse and let us know if she is not on the medication, if a new script needs to be sent to her pharmacy. Kathryn verbalized understanding and will call with any concerns in between visits.    Arlet Webb, RN, BSN  Nephrology Care Coordinator  [facility]

## 2017-09-21 ENCOUNTER — OFFICE VISIT (OUTPATIENT)
Dept: FAMILY MEDICINE | Facility: OTHER | Age: 77
End: 2017-09-21
Payer: COMMERCIAL

## 2017-09-21 VITALS
HEIGHT: 64 IN | WEIGHT: 168 LBS | OXYGEN SATURATION: 99 % | TEMPERATURE: 98.4 F | BODY MASS INDEX: 28.68 KG/M2 | SYSTOLIC BLOOD PRESSURE: 142 MMHG | DIASTOLIC BLOOD PRESSURE: 64 MMHG | HEART RATE: 90 BPM | RESPIRATION RATE: 16 BRPM

## 2017-09-21 DIAGNOSIS — E87.20 METABOLIC ACIDOSIS, NORMAL ANION GAP (NAG): ICD-10-CM

## 2017-09-21 DIAGNOSIS — E11.59 TYPE 2 DIABETES MELLITUS WITH OTHER CIRCULATORY COMPLICATIONS (H): ICD-10-CM

## 2017-09-21 DIAGNOSIS — J81.1 CHRONIC PULMONARY EDEMA: ICD-10-CM

## 2017-09-21 DIAGNOSIS — E87.79 OTHER HYPERVOLEMIA: ICD-10-CM

## 2017-09-21 DIAGNOSIS — I48.91 ATRIAL FIBRILLATION WITH RAPID VENTRICULAR RESPONSE (H): ICD-10-CM

## 2017-09-21 DIAGNOSIS — I27.20 PULMONARY HYPERTENSION (H): Primary | ICD-10-CM

## 2017-09-21 DIAGNOSIS — I10 ESSENTIAL HYPERTENSION WITH GOAL BLOOD PRESSURE LESS THAN 140/90: ICD-10-CM

## 2017-09-21 DIAGNOSIS — E78.5 HYPERLIPIDEMIA LDL GOAL <100: ICD-10-CM

## 2017-09-21 DIAGNOSIS — I13.0 HYPERTENSIVE HEART AND CHRONIC KIDNEY DISEASE WITH HEART FAILURE AND STAGE 1 THROUGH STAGE 4 CHRONIC KIDNEY DISEASE, OR UNSPECIFIED CHRONIC KIDNEY DISEASE (H): ICD-10-CM

## 2017-09-21 PROCEDURE — 99214 OFFICE O/P EST MOD 30 MIN: CPT | Performed by: PHYSICIAN ASSISTANT

## 2017-09-21 ASSESSMENT — PAIN SCALES - GENERAL: PAINLEVEL: NO PAIN (0)

## 2017-09-21 NOTE — MR AVS SNAPSHOT
After Visit Summary   9/21/2017    Kathryn Banks    MRN: [id number]           Patient Information     Date Of Birth          1940        Visit Information        Provider Department      9/21/2017 1:40 PM Jovi Thomas PA-C [facility]        Today's Diagnoses     Pulmonary hypertension (H)    -  1    Chronic pulmonary edema        Hyperlipidemia LDL goal <100        Type 2 diabetes mellitus with other circulatory complications (H)        Other hypervolemia        Metabolic acidosis, normal anion gap (NAG)        Atrial fibrillation with rapid ventricular response (H)        Essential hypertension with goal blood pressure less than 140/90        Hypertensive heart and chronic kidney disease with heart failure and stage 1 through stage 4 chronic kidney disease, or unspecified chronic kidney disease (H)           Follow-ups after your visit        Your next 10 appointments already scheduled     Sep 21, 2017  1:40 PM CDT   [facility] Visit with Jovi Thomas PA-C   [facility]    [address] [facility] [address] [phone]           Bring a current list of meds and any records pertaining to this visit. For Physicals, please bring immunization records and any forms needing to be filled out. Please arrive 10 minutes early to complete paperwork.            Sep 22, 2017  7:30 AM CDT   Lab with  LAB   [facility])    [address] [place] [id number] [phone]            Sep 22, 2017  8:00 AM CDT   (Arrive by 7:45 AM)   CORE RETURN with RHODA Liu [facility] ([facility])    [address] [place] [address]  3rd [facility] [id number] [phone]            Oct 04, 2017 11:00 AM CDT   LAB with NL LAB [facility] ([facility])    [address]  [facility] [address]    [phone]           Patient must bring picture ID. Patient should be prepared to give a urine specimen  Please do not eat 10-12 hours before your appointment if you are coming in fasting for labs on lipids, cholesterol, or glucose (sugar). Pregnant women should follow their Care Team instructions. Water with medications is okay. Do not drink coffee or other fluids. If you have concerns about taking  your medications, please ask at office or if scheduling via [employer], send a message by clicking on Secure Messaging, Message Your Care Team.            Nov 20, 2017 12:20 PM CST   LAB with LAB FIRST FLOOR [facility] ([facility])    [address] [address] [place] [address] [phone]           Patient must bring picture ID. Patient should be prepared to give a urine specimen  Please do not eat 10-12 hours before your appointment if you are coming in fasting for labs on lipids, cholesterol, or glucose (sugar). Pregnant women should follow their Care Team instructions. Water with medications is okay. Do not drink coffee or other fluids. If you have concerns about taking  your medications, please ask at office or if scheduling via [employer], send a message by clicking on Secure Messaging, Message Your Care Team.            Nov 20, 2017  1:30 PM CST   Return Visit with Vibha Barfield MD   [facility])    [address] [address] [place] [address] [phone]              Future tests that were ordered for you today     Open Future Orders        Priority Expected Expires Ordered    Lipid panel reflex to direct LDL Routine 10/3/2017 10/5/2017 9/21/2017            Who to contact     If you have questions or need follow up information about today's clinic visit or your schedule please contact [facility] directly at [phone].  Normal or non-critical lab and imaging results will be communicated to  "you by MyChart, letter or phone within 4 business days after the clinic has received the results. If you do not hear from us within 7 days, please contact the clinic through [employer] or phone. If you have a critical or abnormal lab result, we will notify you by phone as soon as possible.  Submit refill requests through [email or web address] or call your pharmacy and they will forward the refill request to us. Please allow 3 business days for your refill to be completed.          Additional Information About Your Visit        [employer] Information     [email or web address] lets you send messages to your doctor, view your test results, renew your prescriptions, schedule appointments and more. To sign up, go to [email or web address] . Click on \"Log in\" on the left side of the screen, which will take you to the Welcome page. Then click on \"Sign up Now\" on the right side of the page.     You will be asked to enter the access code listed below, as well as some personal information. Please follow the directions to create your username and password.     Your access code is: [email or web address]  Expires: 2017  4:04 PM     Your access code will  in 90 days. If you need help or a new code, please call your [place] clinic or [phone].        Care EveryWhere ID     This is your Care EveryWhere ID. This could be used by other organizations to access your [place] medical records  [id number]        Your Vitals Were     Pulse Temperature Respirations Height Pulse Oximetry BMI (Body Mass Index)    90 98.4  F (36.9  C) (Temporal) 16 5' 4\" (1.626 m) 99% 28.84 kg/m2       Blood Pressure from Last 3 Encounters:   17 142/64   17 133/71   17 119/59    Weight from Last 3 Encounters:   17 168 lb (76.2 kg)   17 168 lb 11.2 oz (76.5 kg)   09/15/17 167 lb 11.2 oz (76.1 kg)               Primary Care Provider Office Phone # Fax #    Dheeraj Jj [phone]       [address] " [address] [place] [address]        Equal Access to Services     [facility]: Hadii aad ku hadjignavenessa Sojeannetteali, waaxda luqadaha, qaybta kaalmatay benoit. So [facility] [phone].    ATENCIÓN: Si habla español, tiene a medrano disposición servicios gratuitos de asistencia lingüística. Llame al [phone].    We comply with applicable federal civil rights laws and [place] laws. We do not discriminate on the basis of race, color, national origin, age, disability sex, sexual orientation or gender identity.            Thank you!     Thank you for choosing [facility]  for your care. Our goal is always to provide you with excellent care. Hearing back from our patients is one way we can continue to improve our services. Please take a few minutes to complete the written survey that you may receive in the mail after your visit with us. Thank you!             Your Updated Medication List - Protect others around you: Learn how to safely use, store and throw away your medicines at www.disposemymeds.org.          This list is accurate as of: 9/21/17  1:39 PM.  Always use your most recent med list.                   Brand Name Dispense Instructions for use Diagnosis    ACETAMINOPHEN PO      Take 650 mg by mouth every 4 hours as needed for pain For pain 1-5 out of 10        acidophilus tablet      Take 1 tablet by mouth daily    Urinary tract infection without hematuria, site unspecified, Cellulitis of lower extremity, unspecified laterality       amLODIPine 5 MG tablet    NORVASC    30 tablet    Take 2 tablets (10 mg) by mouth daily    Benign essential hypertension       aspirin 81 MG tablet     30 tablet    Take 1 tablet (81 mg) by mouth daily    Coronary artery disease involving native coronary artery of native heart without angina pectoris       calcitRIOL 0.5 MCG capsule    ROCALTROL     Take 1 capsule (0.5 mcg) by mouth daily    Constitutional chronic hypocalcemia        calcium acetate 667 MG Caps capsule    PHOSLO    180 capsule    Take 1 capsule (667 mg) by mouth 3 times daily (with meals)    Chronic kidney disease, unspecified CKD stage       Calcium Carb-Cholecalciferol 500-400 MG-UNIT Tabs      Take 1 tablet by mouth 2 times daily        COUMADIN PO      Take by mouth daily Take as directed per INR results        cyanocobalamin 1000 MCG/ML injection    VITAMIN B12     Inject 1 mL into the muscle every 30 days        darbepoetin hira 60 MCG/0.3ML injection    ARANESP (ALBUMIN FREE)    0.3 mL    Inject 0.3 mLs (60 mcg) Subcutaneous every 14 days As needed for hgb<10g/dL.  If Hgb increases >1 point in 2 weeks (if blood transfusion given, use hgb PRIOR to this), SYSTOLIC BP > 180 mmHg or hgb>=10g/dL, HOLD DOSE. Dose must be within 1 week of Hgb.  Per anemia protocol with Vibha Barfield MD/Yesy Samaniego,PharmD [phone]    Anemia in stage 5 chronic kidney disease (H), CKD (chronic kidney disease) stage 5, GFR less than 15 ml/min (H)       ferrous sulfate 325 (65 FE) MG tablet    IRON    100 tablet    Take 1 tablet (325 mg) by mouth daily (with breakfast)    CKD (chronic kidney disease) stage 5, GFR less than 15 ml/min (H)       fluticasone 50 MCG/ACT spray    FLONASE    1 Bottle    Spray 1 spray into both nostrils daily    Nasal congestion       gabapentin 300 MG capsule    NEURONTIN    90 capsule    Take 1 capsule (300 mg) by mouth At Bedtime    Diabetic polyneuropathy associated with type 2 diabetes mellitus (H)       guaiFENesin-dextromethorphan 100-10 MG/5ML syrup    ROBITUSSIN DM    560 mL    Take 5 mLs by mouth every 8 hours as needed for cough or congestion    Cough       ipratropium - albuterol 0.5 mg/2.5 mg/3 mL 0.5-2.5 (3) MG/3ML neb solution    DUONEB     Take 1 vial by nebulization 3 times daily        metoprolol 50 MG tablet    LOPRESSOR    180 tablet    Take 0.5 tablets (25 mg) by mouth 2 times daily    Atrial fibrillation with rapid ventricular response (H)        metroNIDAZOLE 500 MG tablet    FLAGYL    42 tablet    Take 1 tablet (500 mg) by mouth every 8 hours    Colitis due to Clostridium difficile       nitroGLYcerin 0.4 MG sublingual tablet    NITROSTAT    25 tablet    Place 1 tablet (0.4 mg) under the tongue every 5 minutes as needed for chest pain    Hx of non-ST elevation myocardial infarction (NSTEMI), Atherosclerosis of native coronary artery of native heart without angina pectoris, Postsurgical aortocoronary bypass status       order for DME     1 Device    Equipment being ordered: cpap machine, mask, humidifier, tubing and filters    ANABEL (obstructive sleep apnea)       pentoxifylline 400 MG CR tablet    TRENtal     Take 1 tablet (400 mg) by mouth daily    Venous stasis ulcer (H)       pramipexole 0.125 MG tablet    MIRAPEX    90 tablet    Take 1 tablet (0.125 mg) by mouth 3 times daily    Restless leg syndrome       pravastatin 40 MG tablet    PRAVACHOL    90 tablet    Take 1 tablet (40 mg) by mouth daily    Hx of non-ST elevation myocardial infarction (NSTEMI), Atherosclerosis of native coronary artery of native heart without angina pectoris, Type 2 diabetes mellitus with other circulatory complications (H)       sodium bicarbonate 650 MG tablet      Take 2 tablets (1,300 mg) by mouth 3 times daily    Chronic kidney disease, unspecified CKD stage       torsemide 20 MG tablet    DEMADEX    30 tablet    Take 2 tablets (40 mg) by mouth 2 times daily    Chronic diastolic congestive heart failure (H)       TRAMADOL HCL PO      Take 50 mg by mouth every 6 hours as needed for moderate to severe pain Pain of 6-10 out of 10

## 2017-09-21 NOTE — NURSING NOTE
"Chief Complaint   Patient presents with     [facility] F/U     Panel Management     See HM       Initial /64 (Cuff Size: Adult Regular)  Pulse 90  Temp 98.4  F (36.9  C) (Temporal)  Resp 16  Ht 5' 4\" (1.626 m)  Wt 168 lb (76.2 kg)  SpO2 99%  BMI 28.84 kg/m2 Estimated body mass index is 28.84 kg/(m^2) as calculated from the following:    Height as of this encounter: 5' 4\" (1.626 m).    Weight as of this encounter: 168 lb (76.2 kg).  Medication Reconciliation: complete   Allyson Grajeda CMA (AAMA)    "

## 2017-09-22 ENCOUNTER — TELEPHONE (OUTPATIENT)
Dept: INTERNAL MEDICINE | Facility: CLINIC | Age: 77
End: 2017-09-22

## 2017-09-22 ENCOUNTER — OFFICE VISIT (OUTPATIENT)
Dept: CARDIOLOGY | Facility: CLINIC | Age: 77
End: 2017-09-22
Attending: NURSE PRACTITIONER
Payer: MEDICARE

## 2017-09-22 ENCOUNTER — ANTICOAGULATION THERAPY VISIT (OUTPATIENT)
Dept: ANTICOAGULATION | Facility: CLINIC | Age: 77
End: 2017-09-22

## 2017-09-22 VITALS
DIASTOLIC BLOOD PRESSURE: 77 MMHG | OXYGEN SATURATION: 98 % | SYSTOLIC BLOOD PRESSURE: 147 MMHG | HEIGHT: 64 IN | BODY MASS INDEX: 29.02 KG/M2 | WEIGHT: 170 LBS | HEART RATE: 99 BPM

## 2017-09-22 DIAGNOSIS — I50.32 CHRONIC DIASTOLIC HEART FAILURE (H): Primary | ICD-10-CM

## 2017-09-22 DIAGNOSIS — A04.72 C. DIFFICILE COLITIS: ICD-10-CM

## 2017-09-22 DIAGNOSIS — Z79.01 LONG-TERM (CURRENT) USE OF ANTICOAGULANTS: ICD-10-CM

## 2017-09-22 DIAGNOSIS — I50.32 CHRONIC DIASTOLIC HEART FAILURE (H): ICD-10-CM

## 2017-09-22 LAB
ANION GAP SERPL CALCULATED.3IONS-SCNC: 11 MMOL/L (ref 3–14)
BUN SERPL-MCNC: 43 MG/DL (ref 7–30)
CALCIUM SERPL-MCNC: 6.5 MG/DL (ref 8.5–10.1)
CHLORIDE SERPL-SCNC: 120 MMOL/L (ref 94–109)
CO2 SERPL-SCNC: 15 MMOL/L (ref 20–32)
CREAT SERPL-MCNC: 4.34 MG/DL (ref 0.52–1.04)
GFR SERPL CREATININE-BSD FRML MDRD: 10 ML/MIN/1.7M2
GLUCOSE SERPL-MCNC: 169 MG/DL (ref 70–99)
INR PPP: 1.4
NT-PROBNP SERPL-MCNC: ABNORMAL PG/ML (ref 0–450)
POTASSIUM SERPL-SCNC: 4.8 MMOL/L (ref 3.4–5.3)
SODIUM SERPL-SCNC: 146 MMOL/L (ref 133–144)

## 2017-09-22 PROCEDURE — 83880 ASSAY OF NATRIURETIC PEPTIDE: CPT | Performed by: NURSE PRACTITIONER

## 2017-09-22 PROCEDURE — 99214 OFFICE O/P EST MOD 30 MIN: CPT | Mod: ZP | Performed by: NURSE PRACTITIONER

## 2017-09-22 PROCEDURE — 36415 COLL VENOUS BLD VENIPUNCTURE: CPT | Performed by: NURSE PRACTITIONER

## 2017-09-22 PROCEDURE — 99212 OFFICE O/P EST SF 10 MIN: CPT | Mod: ZF

## 2017-09-22 PROCEDURE — 80048 BASIC METABOLIC PNL TOTAL CA: CPT | Performed by: NURSE PRACTITIONER

## 2017-09-22 ASSESSMENT — PAIN SCALES - GENERAL: PAINLEVEL: NO PAIN (0)

## 2017-09-22 NOTE — TELEPHONE ENCOUNTER
LeighaRN at Issue is looking into possibility of aranesp admin there in clinic.    Yesy Samaniego, PharmD, [facility]  [phone]  September 22, 2017

## 2017-09-22 NOTE — MR AVS SNAPSHOT
After Visit Summary   9/22/2017    Kathryn Banks    MRN: [id number]           Patient Information     Date Of Birth          1940        Visit Information        Provider Department      9/22/2017 8:00 AM Helen Plasencia APRN [facility]        Today's Diagnoses     Chronic diastolic heart failure (H)    -  1    C. difficile colitis          Care Instructions    You were seen today in the Cardiovascular Clinic at the [facility].     Cardiology Providers you saw during your visit: Helen Plasencia       1.Please make a follow-up CORE/heart failure appt with  in 1 month with labs prior.  2. Stop Flagyl and start Vanco. Prescription called into your pharmacy.  3. Monitor blood pressures.   4. Have Home health nurse keep tract of blood pressure trends. Call with concerns.     Results for KATHRYN BANKS (MRN [id number]) as of 9/22/2017 09:06   Ref. Range 9/22/2017 08:18   Sodium Latest Ref Range: 133 - 144 mmol/L 146 (H)   Potassium Latest Ref Range: 3.4 - 5.3 mmol/L 4.8   Chloride Latest Ref Range: 94 - 109 mmol/L 120 (H)   Carbon Dioxide Latest Ref Range: 20 - 32 mmol/L 15 (L)   Urea Nitrogen Latest Ref Range: 7 - 30 mg/dL 43 (H)   Creatinine Latest Ref Range: 0.52 - 1.04 mg/dL 4.34 (H)   GFR Estimate Latest Ref Range: >60 mL/min/1.7m2 10 (L)   GFR Estimate If Black Latest Ref Range: >60 mL/min/1.7m2 12 (L)   Calcium Latest Ref Range: 8.5 - 10.1 mg/dL 6.5 (L)   Anion Gap Latest Ref Range: 3 - 14 mmol/L 11   N-Terminal Pro Bnp Latest Ref Range: 0 - 450 pg/mL 21369 (H)   Glucose Latest Ref Range: 70 - 99 mg/dL 169 (H)         Please limit your fluid intake to 2 L (64 ounces) daily.  2 Liters a day = 8.5 cups, or 72 ounces.  Please limit your salt intake to 2 grams a day or less.    If you gain 2# in 24 hours or 5# in one week call Melinda Farooq RN so we can adjust your medications as needed over the phone.    Please feel free to call me with any questions or  "concerns.      Melinda Farooq RN BSN   [facility]  Cardiology Care Coordinator-Heart Failure Clinic    Questions and [phone].   First press #1 for the University and then press #3 for \"Medical Questions\" to reach us Cardiology Nurses.     On Call Cardiologist for after hours or on weekends: [phone]   option #4 and ask to speak to the on-call Cardiologist. Inform them you are a CORE/heart failure patient at the [place].        If you need a medication refill please contact your pharmacy.  Please allow 3 business days for your refill to be completed.  _______________________________________________________  C.O.R.E. CLINIC Cardiomyopathy, Optimization, Rehabilitation, Education   The C.O.R.E. CLINIC is a heart failure specialty clinic within the [facility] where you will work with specialized nurse practitioners dedicated to helping patients with heart failure carefully adjust medications, receive education, and learn who and when to call if symptoms develop. They specialize in helping you better understand your condition, slow the progression of your disease, improve the length and quality of your life, help you detect future heart problems before they become life threatening, and avoid hospitalizations.  As always, thank you for trusting us with your health care needs!                Follow-ups after your visit        Your next 10 appointments already scheduled     Oct 04, 2017 11:00 AM CDT   LAB with NL LAB [facility] ([facility])    [address] [address] [address] [phone]           Patient must bring picture ID. Patient should be prepared to give a urine specimen  Please do not eat 10-12 hours before your appointment if you are coming in fasting for labs on lipids, cholesterol, or glucose (sugar). Pregnant women should follow their Care Team instructions. Water with medications " is okay. Do not drink coffee or other fluids. If you have concerns about taking  your medications, please ask at office or if scheduling via [employer], send a message by clicking on Secure Messaging, Message Your Care Team.            Oct 24, 2017  2:00 PM CDT   LAB with NL LAB [facility] ([facility])    [address] [facility] [facility] [address] [phone]           Patient must bring picture ID. Patient should be prepared to give a urine specimen  Please do not eat 10-12 hours before your appointment if you are coming in fasting for labs on lipids, cholesterol, or glucose (sugar). Pregnant women should follow their Care Team instructions. Water with medications is okay. Do not drink coffee or other fluids. If you have concerns about taking  your medications, please ask at office or if scheduling via [employer], send a message by clicking on Secure Messaging, Message Your Care Team.            Oct 26, 2017  2:30 PM CDT   Core Return with Destiny Berrios NP   [facility] AT [facility] ([facility])    [address] [place] [address] [address]nd [facility] [address]   [phone]            Nov 02, 2017  2:00 PM CDT   (Arrive by 1:45 PM)   New Vascular Visit with Cassie Cardenas MD   [facility] ([facility])    [address]  [address]rd [facility] [address] [phone]            Nov 20, 2017 12:20 PM CST   LAB with LAB FIRST FLOOR [facility] ([facility])    [address] [place] [id number] [phone]           Patient must bring picture ID. Patient should be prepared to give a urine specimen  Please do not eat 10-12 hours before your appointment if you are coming in fasting for labs on lipids, cholesterol, or glucose (sugar). Pregnant women should follow their Care Team instructions. Water with medications is okay. Do not drink coffee  "or other fluids. If you have concerns about taking  your medications, please ask at office or if scheduling via [employer], send a message by clicking on Secure Messaging, Message Your Care Team.            Nov 20, 2017  1:30 PM CST   Return Visit with Vibha Barfield MD   [facility] ([facility])    [address] [place] [id number] [phone]              Future tests that were ordered for you today     Open Future Orders        Priority Expected Expires Ordered    Lipid panel reflex to direct LDL Routine 10/3/2017 10/5/2017 9/21/2017            Who to contact     If you have questions or need follow up information about today's clinic visit or your schedule please contact [facility] directly at [phone].  Normal or non-critical lab and imaging results will be communicated to you by [employer], letter or phone within 4 business days after the clinic has received the results. If you do not hear from us within 7 days, please contact the clinic through [employer] or phone. If you have a critical or abnormal lab result, we will notify you by phone as soon as possible.  Submit refill requests through [employer] or call your pharmacy and they will forward the refill request to us. Please allow 3 business days for your refill to be completed.          Additional Information About Your Visit        [employer] Information     [employer] lets you send messages to your doctor, view your test results, renew your prescriptions, schedule appointments and more. To sign up, go to [email or web address] . Click on \"Log in\" on the left side of the screen, which will take you to the Welcome page. Then click on \"Sign up Now\" on the right side of the page.     You will be asked to enter the access code listed below, as well as some personal information. Please follow the directions to create your username and password.     Your access code is: [email or web address]  Expires: 11/20/2017  " "4:04 PM     Your access code will  in 90 days. If you need help or a new code, please call your [facility] or [phone].        Care EveryWhere ID     This is your Care EveryWhere ID. This could be used by other organizations to access your [facility] medical records  [id number]        Your Vitals Were     Pulse Height Pulse Oximetry BMI (Body Mass Index)          99 1.626 m (5' 4\") 98% 29.18 kg/m2         Blood Pressure from Last 3 Encounters:   17 147/77   17 142/64   17 133/71    Weight from Last 3 Encounters:   17 77.1 kg (170 lb)   17 76.2 kg (168 lb)   17 76.5 kg (168 lb 11.2 oz)                 Today's Medication Changes          These changes are accurate as of: 17  9:26 AM.  If you have any questions, ask your nurse or doctor.               Start taking these medicines.        Dose/Directions    vancomycin 50 mg/mL Liqd solution   Commonly known as:  VANOCIN   Used for:  C. difficile colitis   Started by:  Helen Plasencia APRN CNP        Dose:  125 mg   Take 2.5 mLs (125 mg) by mouth 4 times daily for 14 days   Quantity:  140 mL   Refills:  0         Stop taking these medicines if you haven't already. Please contact your care team if you have questions.     metroNIDAZOLE 500 MG tablet   Commonly known as:  FLAGYL   Stopped by:  Helen Plasencia APRN CNP                Where to get your medicines      These medications were sent to [facility] [address] [place] [address] [address]  [address] [facility] [address]     Phone:  [phone]     vancomycin 50 mg/mL Liqd solution                Primary Care Provider Office Phone # Fax #    Dheeraj Jj [phone]       [place] [address] [place] [address]        Equal Access to Services     CAROLINE BLAND AH: lT cameron Sodory, waaxda luqadaha, qaybta kaalmada beverlyyamely, tay murillo. So [facility] " [phone].    ATENCIÓN: Si arturo garcia, tiene a medrano disposición servicios gratuitos de asistencia lingüística. Vicki clark [phone].    We comply with applicable federal civil rights laws and [place] laws. We do not discriminate on the basis of race, color, national origin, age, disability sex, sexual orientation or gender identity.            Thank you!     Thank you for choosing [facility]  for your care. Our goal is always to provide you with excellent care. Hearing back from our patients is one way we can continue to improve our services. Please take a few minutes to complete the written survey that you may receive in the mail after your visit with us. Thank you!             Your Updated Medication List - Protect others around you: Learn how to safely use, store and throw away your medicines at www.disposemymeds.org.          This list is accurate as of: 9/22/17  9:26 AM.  Always use your most recent med list.                   Brand Name Dispense Instructions for use Diagnosis    ACETAMINOPHEN PO      Take 650 mg by mouth every 4 hours as needed for pain For pain 1-5 out of 10        acidophilus tablet      Take 1 tablet by mouth daily    Urinary tract infection without hematuria, site unspecified, Cellulitis of lower extremity, unspecified laterality       amLODIPine 5 MG tablet    NORVASC    30 tablet    Take 2 tablets (10 mg) by mouth daily    Benign essential hypertension       aspirin 81 MG tablet     30 tablet    Take 1 tablet (81 mg) by mouth daily    Coronary artery disease involving native coronary artery of native heart without angina pectoris       calcitRIOL 0.5 MCG capsule    ROCALTROL     Take 1 capsule (0.5 mcg) by mouth daily    Constitutional chronic hypocalcemia       calcium acetate 667 MG Caps capsule    PHOSLO    180 capsule    Take 1 capsule (667 mg) by mouth 3 times daily (with meals)    Chronic kidney disease, unspecified CKD stage       Calcium Carb-Cholecalciferol  500-400 MG-UNIT Tabs      Take 1 tablet by mouth 2 times daily        COUMADIN PO      Take by mouth daily Take as directed per INR results        cyanocobalamin 1000 MCG/ML injection    VITAMIN B12     Inject 1 mL into the muscle every 30 days        darbepoetin hira 60 MCG/0.3ML injection    ARANESP (ALBUMIN FREE)    0.3 mL    Inject 0.3 mLs (60 mcg) Subcutaneous every 14 days As needed for hgb<10g/dL.  If Hgb increases >1 point in 2 weeks (if blood transfusion given, use hgb PRIOR to this), SYSTOLIC BP > 180 mmHg or hgb>=10g/dL, HOLD DOSE. Dose must be within 1 week of Hgb.  Per anemia protocol with Vibha Barfield MD/Yesy Samaniego,PharmD [phone]    Anemia in stage 5 chronic kidney disease (H), CKD (chronic kidney disease) stage 5, GFR less than 15 ml/min (H)       ferrous sulfate 325 (65 FE) MG tablet    IRON    100 tablet    Take 1 tablet (325 mg) by mouth daily (with breakfast)    CKD (chronic kidney disease) stage 5, GFR less than 15 ml/min (H)       fluticasone 50 MCG/ACT spray    FLONASE    1 Bottle    Spray 1 spray into both nostrils daily    Nasal congestion       gabapentin 300 MG capsule    NEURONTIN    90 capsule    Take 1 capsule (300 mg) by mouth At Bedtime    Diabetic polyneuropathy associated with type 2 diabetes mellitus (H)       guaiFENesin-dextromethorphan 100-10 MG/5ML syrup    ROBITUSSIN DM    560 mL    Take 5 mLs by mouth every 8 hours as needed for cough or congestion    Cough       ipratropium - albuterol 0.5 mg/2.5 mg/3 mL 0.5-2.5 (3) MG/3ML neb solution    DUONEB     Take 1 vial by nebulization 3 times daily        metoprolol 50 MG tablet    LOPRESSOR    180 tablet    Take 0.5 tablets (25 mg) by mouth 2 times daily    Atrial fibrillation with rapid ventricular response (H)       nitroGLYcerin 0.4 MG sublingual tablet    NITROSTAT    25 tablet    Place 1 tablet (0.4 mg) under the tongue every 5 minutes as needed for chest pain    Hx of non-ST elevation myocardial infarction (NSTEMI),  Atherosclerosis of native coronary artery of native heart without angina pectoris, Postsurgical aortocoronary bypass status       order for DME     1 Device    Equipment being ordered: cpap machine, mask, humidifier, tubing and filters    ANABEL (obstructive sleep apnea)       pentoxifylline 400 MG CR tablet    TRENtal     Take 1 tablet (400 mg) by mouth daily    Venous stasis ulcer (H)       pramipexole 0.125 MG tablet    MIRAPEX    90 tablet    Take 1 tablet (0.125 mg) by mouth 3 times daily    Restless leg syndrome       pravastatin 40 MG tablet    PRAVACHOL    90 tablet    Take 1 tablet (40 mg) by mouth daily    Hx of non-ST elevation myocardial infarction (NSTEMI), Atherosclerosis of native coronary artery of native heart without angina pectoris, Type 2 diabetes mellitus with other circulatory complications (H)       sodium bicarbonate 650 MG tablet      Take 2 tablets (1,300 mg) by mouth 3 times daily    Chronic kidney disease, unspecified CKD stage       torsemide 20 MG tablet    DEMADEX    30 tablet    Take 2 tablets (40 mg) by mouth 2 times daily    Chronic diastolic congestive heart failure (H)       TRAMADOL HCL PO      Take 50 mg by mouth every 6 hours as needed for moderate to severe pain Pain of 6-10 out of 10        vancomycin 50 mg/mL Liqd solution    VANOCIN    140 mL    Take 2.5 mLs (125 mg) by mouth 4 times daily for 14 days    C. difficile colitis

## 2017-09-22 NOTE — TELEPHONE ENCOUNTER
Andrea will provide aranesp but need us to intiate PA for clinic admin there. I am unfamiliar with this process so will have to do some investigation. May recommend she go to [facility] as unclear how long this may take. Tried calling her but she is out.

## 2017-09-22 NOTE — PATIENT INSTRUCTIONS
"You were seen today in the Cardiovascular Clinic at the [facility].     Cardiology Providers you saw during your visit: Helen Plasencia       1.Please make a follow-up CORE/heart failure appt with  in 1 month with labs prior.  2. Stop Flagyl and start Vanco. Prescription called into your pharmacy.   3. Have Home health nurse keep tract of blood pressures. Call with concerns.     Results for MARI HERNANDEZ (MRN [id number]) as of 2017 09:06   Ref. Range 2017 08:18   Sodium Latest Ref Range: 133 - 144 mmol/L 146 (H)   Potassium Latest Ref Range: 3.4 - 5.3 mmol/L 4.8   Chloride Latest Ref Range: 94 - 109 mmol/L 120 (H)   Carbon Dioxide Latest Ref Range: 20 - 32 mmol/L 15 (L)   Urea Nitrogen Latest Ref Range: 7 - 30 mg/dL 43 (H)   Creatinine Latest Ref Range: 0.52 - 1.04 mg/dL 4.34 (H)   GFR Estimate Latest Ref Range: >60 mL/min/1.7m2 10 (L)   GFR Estimate If Black Latest Ref Range: >60 mL/min/1.7m2 12 (L)   Calcium Latest Ref Range: 8.5 - 10.1 mg/dL 6.5 (L)   Anion Gap Latest Ref Range: 3 - 14 mmol/L 11   N-Terminal Pro Bnp Latest Ref Range: 0 - 450 pg/mL 48239 (H)   Glucose Latest Ref Range: 70 - 99 mg/dL 169 (H)         Please limit your fluid intake to 2 L (64 ounces) daily.  2 Liters a day = 8.5 cups, or 72 ounces.  Please limit your salt intake to 2 grams a day or less.    If you gain 2# in 24 hours or 5# in one week call Melinda Farooq RN so we can adjust your medications as needed over the phone.    Please feel free to call me with any questions or concerns.      Melinda Farooq RN BSN   [facility]  Cardiology Care Coordinator-Heart Failure Clinic    Questions and [phone].   First press #1 for the University and then press #3 for \"Medical Questions\" to reach us Cardiology Nurses.     On Call Cardiologist for after hours or on weekends: [phone]   option #4 and ask to speak to the on-call Cardiologist. Inform them you are a CORE/heart failure patient at " the [place].        If you need a medication refill please contact your pharmacy.  Please allow 3 business days for your refill to be completed.  _______________________________________________________  C.O.R.E. CLINIC Cardiomyopathy, Optimization, Rehabilitation, Education   The C.O.R.E. CLINIC is a heart failure specialty clinic within the [facility] where you will work with specialized nurse practitioners dedicated to helping patients with heart failure carefully adjust medications, receive education, and learn who and when to call if symptoms develop. They specialize in helping you better understand your condition, slow the progression of your disease, improve the length and quality of your life, help you detect future heart problems before they become life threatening, and avoid hospitalizations.  As always, thank you for trusting us with your health care needs!

## 2017-09-22 NOTE — NURSING NOTE
Diet: Patient instructed regarding a heart failure healthy diet, including discussion of reduced fat and 2000 mg daily sodium restriction, daily weights, medication purpose and compliance, fluid restrictions and resources for patient and family to access for assistance with heart failure management.       Labs: Patient was given results of the laboratory testing obtained today and patient was instructed about when to return for the next laboratory testing.    Med Reconcile: Reviewed and verified all current medications with the patient. The updated medication list was printed and given to the patient.    Return Appointment: Patient given instructions regarding scheduling next clinic visit.     Patient stated she understood all health information given and agreed to call with further questions or concerns.    Today's visit:   1. Stop flagyl. Start Vanco. Prescription called into pharmacy. Call us with any financial concerns.   2. Have home health nurse monitor BP's (Leona notified).  Call us with any concerns.   3. F/u in one month.

## 2017-09-22 NOTE — MR AVS SNAPSHOT
Kathryn MICKY Banks   9/22/2017   Anticoagulation Therapy Visit    Description:  77 year old female   Provider:  Dheeraj Jj MD   Department:  Ph Anticoag           INR as of 9/22/2017     Today's INR 1.4!      Anticoagulation Summary as of 9/22/2017     INR goal 2.0-3.0   Today's INR 1.4!   Full instructions 9/22: 2 mg; 9/23: 2 mg; 9/24: 1 mg   Next INR check 9/25/2017    Indications   Long-term (current) use of anticoagulants [Z79.01] [Z79.01]  Atrial fibrillation (H) (Resolved) [I48.91]  Paroxysmal atrial fibrillation (H) (Resolved) [I48.0]         Contact Numbers     Clinic Number:         September 2017 Details    Sun Mon Tue Wed Thu Fri Sat          1               2                 3               4               5               6               7               8               9                 10               11               12               13               14               15               16                 17               18               19               20               21               22      2 mg   See details      23      2 mg           24      1 mg         25            26               27               28               29               30                Date Details   09/22 This INR check       Date of next INR:  9/25/2017         How to take your warfarin dose     To take:  1 mg Take 1 of the 1 mg tablets.    To take:  2 mg Take 2 of the 1 mg tablets.

## 2017-09-22 NOTE — LETTER
9/22/2017      RE: Kathryn Banks  [address] [facility] [address]       Dear Colleague,    Thank you for the opportunity to participate in the care of your patient, Kathryn Banks, at the [facility] HEART [facility] at [facility]. Please see a copy of my visit note below.    HPI:   Ms. Banks is a 77 year old female with a past medical history including HFpEF, Aflutter s/p ablation 6/7/17, CAD s/p CABG in 2006, HTN, CKD Stage IV, obesity, ANABEL, and DM Type II. She underwent hospitalization at [facility] for heart failure exacerbation secondary to Cdif colitis and flash pulmonary edema from 9/13-9/15. She presents to clinic for post hospital follow up.     Her most recent echocardiogram 8/13/17 notes EF 60-65% and global RV function mild-moderately reduced. Ziopatch results remain pending.     She complains of persistent diarrhea, about 2 times daily since hospital discharge. She denies fever, chills, lightheadedness, dizziness, chest pain, palpitations, SOB, LEON, PND, orthopnea, nausea, vomiting, diarrhea, hematochezia, melena, or LE edema. Her weight remains stable at home around 169-170 lbs, which is a baseline per patient. She continues to follow a low sodium diet.        PAST MEDICAL HISTORY:  Past Medical History:   Diagnosis Date     Carpal tunnel syndrome      Coronary atherosclerosis of native coronary artery 2006    5 vessel CABG     OBSTRUCTIVE SLEEP APNEA     using CPAP     Osteoarthrosis, unspecified whether generalized or localized, unspecified site     generalized arthritis, particularly in her hands and feet         Other and combined forms of senile cataract 2000    Bilateral     Other and unspecified hyperlipidemia      RESTLESS LEGS SYNDROME      TENOSYNOV HAND/WRIST NEC 6/30/2006     Type II or unspecified type diabetes mellitus without mention of complication, not stated as uncontrolled 2001    Diabetes mellitus/pt is diet controlled with weight loss      Typical atrial flutter (H) 2007    ablation 2017     Unspecified essential hypertension        FAMILY HISTORY:  Family History   Problem Relation Age of Onset     DIABETES Father      AODM     Neurologic Disorder Father      Parkinson's     Blood Disease Father       from blood clot from leg to lung immediately after hip fracture     DIABETES Paternal Grandmother      adult onset     DIABETES Maternal Grandmother      adult onset     Hypertension No family hx of      CEREBROVASCULAR DISEASE No family hx of        SOCIAL HISTORY:  Social History     Social History     Marital status:      Spouse name: Urbano Banks     Number of children: 2     Years of education: 13     Occupational History     Ministry coordinator      [employer]     Social History Main Topics     Smoking status: Former Smoker     Years: 10.     Quit date: 1969     Smokeless tobacco: Never Used     Alcohol use 0.0 oz/week     0 Standard drinks or equivalent per week      Comment: occasional- 2 drinks per month     Drug use: No     Sexual activity: Yes     Birth control/ protection: Surgical     Other Topics Concern     Parent/Sibling W/ Cabg, Mi Or Angioplasty Before 65f 55m? No     Social History Narrative       CURRENT MEDICATIONS:  Outpatient Medications Prior to Visit   Medication Sig Dispense Refill     darbepoetin hira (ARANESP, ALBUMIN FREE,) 60 MCG/0.3ML injection Inject 0.3 mLs (60 mcg) Subcutaneous every 14 days As needed for hgb<10g/dL.  If Hgb increases >1 point in 2 weeks (if blood transfusion given, use hgb PRIOR to this), SYSTOLIC BP > 180 mmHg or hgb>=10g/dL, HOLD DOSE. Dose must be within 1 week of Hgb.  Per anemia protocol with Vibha Barfield MD/Yesy Samaniego,PharmD [phone] 0.3 mL 99     ferrous sulfate (IRON) 325 (65 FE) MG tablet Take 1 tablet (325 mg) by mouth daily (with breakfast) 100 tablet 1     pramipexole (MIRAPEX) 0.125 MG tablet Take 1 tablet (0.125 mg) by  mouth 3 times daily 90 tablet 3     torsemide (DEMADEX) 20 MG tablet Take 2 tablets (40 mg) by mouth 2 times daily 30 tablet 3     Warfarin Sodium (COUMADIN PO) Take by mouth daily Take as directed per INR results       fluticasone (FLONASE) 50 MCG/ACT spray Spray 1 spray into both nostrils daily 1 Bottle 0     calcitRIOL (ROCALTROL) 0.5 MCG capsule Take 1 capsule (0.5 mcg) by mouth daily       Calcium Carb-Cholecalciferol 500-400 MG-UNIT TABS Take 1 tablet by mouth 2 times daily       ipratropium - albuterol 0.5 mg/2.5 mg/3 mL (DUONEB) 0.5-2.5 (3) MG/3ML neb solution Take 1 vial by nebulization 3 times daily       ACETAMINOPHEN PO Take 650 mg by mouth every 4 hours as needed for pain For pain 1-5 out of 10       TRAMADOL HCL PO Take 50 mg by mouth every 6 hours as needed for moderate to severe pain Pain of 6-10 out of 10       cyanocobalamin (VITAMIN B12) 1000 MCG/ML injection Inject 1 mL into the muscle every 30 days       aspirin 81 MG tablet Take 1 tablet (81 mg) by mouth daily 30 tablet 3     sodium bicarbonate 650 MG tablet Take 2 tablets (1,300 mg) by mouth 3 times daily       nitroGLYcerin (NITROSTAT) 0.4 MG sublingual tablet Place 1 tablet (0.4 mg) under the tongue every 5 minutes as needed for chest pain 25 tablet 0     gabapentin (NEURONTIN) 300 MG capsule Take 1 capsule (300 mg) by mouth At Bedtime 90 capsule      pravastatin (PRAVACHOL) 40 MG tablet Take 1 tablet (40 mg) by mouth daily 90 tablet 3     metoprolol (LOPRESSOR) 50 MG tablet Take 0.5 tablets (25 mg) by mouth 2 times daily 180 tablet 3     amLODIPine (NORVASC) 5 MG tablet Take 2 tablets (10 mg) by mouth daily 30 tablet 3     calcium acetate (PHOSLO) 667 MG CAPS capsule Take 1 capsule (667 mg) by mouth 3 times daily (with meals) 180 capsule      pentoxifylline (TRENTAL) 400 MG CR tablet Take 1 tablet (400 mg) by mouth daily       Lactobacillus (ACIDOPHILUS) tablet Take 1 tablet by mouth daily       ORDER FOR DME Equipment being ordered: cpap  "machine, mask, humidifier, tubing and filters 1 Device 0     metroNIDAZOLE (FLAGYL) 500 MG tablet Take 1 tablet (500 mg) by mouth every 8 hours 42 tablet 0     guaiFENesin-dextromethorphan (ROBITUSSIN DM) 100-10 MG/5ML syrup Take 5 mLs by mouth every 8 hours as needed for cough or congestion (Patient not taking: Reported on 9/21/2017) 560 mL 0     No facility-administered medications prior to visit.        ROS:   CONSTITUTIONAL: Denies fever, chills, fatigue, or weight fluctuations.   HEENT: Denies headache, vision changes, and changes in speech.   CV: Refer to HPI.   PULMONARY:Denies shortness of breath, cough, or previous TB exposure.   GI:Denies nausea, vomiting, and abdominal pain. Bowel movements are regular. Complains of diarrhea.   :Denies urinary alterations, dysuria, urinary frequency, hematuria, and abnormal drainage.   EXT:Denies lower extremity edema.   SKIN:Denies abnormal rashes or lesions.   MUSCULOSKELETAL:Denies upper or lower extremity weakness and pain.   NEUROLOGIC:Denies lightheadedness, dizziness, seizures, or upper or lower extremity paresthesia.     EXAM:  /77 (BP Location: Right arm, Patient Position: Chair, Cuff Size: Adult Regular)  Pulse 99  Ht 1.626 m (5' 4\")  Wt 77.1 kg (170 lb)  SpO2 98%  BMI 29.18 kg/m2 Note weight was not obtained, patient informed staff of weight.   GENERAL: Appears alert and oriented times three.   HEENT: Eye symmetrical and free of discharge bilaterally. Mucous membranes moist and without lesions.  NECK: Supple and without lymphadenopathy. JVD art clavicular line upright.   CV: RRR, S1S2 present without murmur, rub, or gallop.   RESPIRATORY: Respirations regular, even, and unlabored. Lungs CTA throughout.   GI: Soft and non distended with hyperactive bowel sounds present in all quadrants. No tenderness, rebound, guarding. No organomegaly.   EXTREMITIES: +1 bilateral LE edema. 2+ bilateral pedal pulses.   NEUROLOGIC: Alert and orientated x 3. CN II-XII " grossly intact. No focal deficits.   MUSCULOSKELETAL: No joint swelling or tenderness.   SKIN: No jaundice. LE wrapped with serosanguinous drainage to dressing.     Labs:  CBC RESULTS:  Lab Results   Component Value Date    WBC 6.7 09/15/2017    RBC 2.75 (L) 09/15/2017    HGB 7.8 (L) 09/19/2017    HCT 25.0 (L) 09/19/2017    MCV 84 09/15/2017    MCH 25.5 (L) 09/15/2017    MCHC 30.3 (L) 09/15/2017    RDW 17.7 (H) 09/15/2017     09/15/2017       CMP RESULTS:  Lab Results   Component Value Date     (H) 09/22/2017    POTASSIUM 4.8 09/22/2017    CHLORIDE 120 (H) 09/22/2017    CO2 15 (L) 09/22/2017    ANIONGAP 11 09/22/2017     (H) 09/22/2017    BUN 43 (H) 09/22/2017    CR 4.34 (H) 09/22/2017    GFRESTIMATED 10 (L) 09/22/2017    GFRESTBLACK 12 (L) 09/22/2017    MALICK 6.5 (L) 09/22/2017    BILITOTAL 0.2 08/27/2017    ALBUMIN 2.2 (L) 09/19/2017    ALKPHOS 292 (H) 08/26/2017    ALT 80 (H) 08/26/2017    AST 34 08/26/2017        INR RESULTS:  Lab Results   Component Value Date    INR 1.6 09/18/2017       Lab Results   Component Value Date    MAG 1.7 08/29/2017     Lab Results   Component Value Date    NTBNPI 26672 (H) 09/13/2017     Lab Results   Component Value Date    NTBNP 32283 (H) 09/22/2017       Assessment and Plan:   Ms. Banks is a 77 year old female with a past medical history including HFpEF, Aflutter s/p ablation 6/7/17, CAD s/p CABG in 2006, HTN, CKD Stage IV, obesity, ANABEL, and DM Type II. She underwent hospitalization at [facility] for heart failure exacerbation secondary to Cdif colitis and flash pulmonary edema from 9/13-9/15. She presents to clinic for post hospital follow up.     Heart failure with preserved ejection fraction.   NYHA Class C, AHA/ACC Stage  III  Rate control: HR-99.  volume status: Near euvolemic.   Blood pressure control: She notes BP elevated this AM due to pain in her LE ulcers. She assures me that her BP is stable per her HHC RN at home. Will obtain logs per Home care RN.    Aldosterone antagonist: Contraindicated in CKD.   Evaluation of coronary arteries: Inconclusive NM Lexiscan 3/4/14.  Sleep apnea screening: ANABEL on CPAP.     Aflutter s/p ablation.   - Zip patch pending per Destiny Deshpande NP.     HTN. She notes BP elevated this AM due to pain in her LE ulcers. She assures me that her BP is stable per her C RN at home. Will obtain logs per Home care RN.   - Obtain 1 week log of BP per HHC RN.   - If remain elevated will increase BB. Continue Norvasc.     CAD. S/p CABG 2006.   - ASA, BB, and Pravachol.     CDIF. Persistent symptoms on Flagyl.   - D/C Flagyl.   - Initiate oral Vancomycin 125 mg po QID for 2 weeks.     CKD Stage IV.   - Management per Nephrology.   - Will notify Nephrology regarding Bicarb of 15. Continue Sodium Bicarb 1300 mg po TID at this time. Will likely improve with change in antibiotics with less diarrhea.     Follow up in [facility] clinic in 1 month.       Helen Plasencia  9/22/2017      CC  RUDY POST

## 2017-09-22 NOTE — PROGRESS NOTES
ANTICOAGULATION FOLLOW-UP CLINIC VISIT    Patient Name:  Kathryn Banks  Date:  9/22/2017  Contact Type:  Telephone/ Karly, HC nurse    SUBJECTIVE:     Patient Findings     Positives Change in medications (on flagyl through next week, per Karly), Intentional hold of therapy (held on Fri 9/15 and 9/16 ), Missed doses (pt was supposed to have INR checked on Wed and it was not done so no Coumadin was taken )    Comments Reason for Call:  INR     Who is calling?  Home [facility]: [facility]     Phone number:  [phone]     Name of caller: Leona     INR Value:  1.4     Are there any other concerns:  No     Can we leave a detailed message on this number? YES     Phone number can be reached at: Other phone number: [phone]        Call taken on 9/22/2017 at 3:54 PM by Tabitha Bland           OBJECTIVE    INR   Date Value Ref Range Status   09/22/2017 1.4  Final       ASSESSMENT / PLAN  INR assessment SUB    Recheck INR In: 3 DAYS    INR Location Homecare INR      Anticoagulation Summary as of 9/22/2017     INR goal 2.0-3.0   Today's INR 1.4!   Maintenance plan No maintenance plan   Full instructions 9/22: 2 mg; 9/23: 2 mg; 9/24: 1 mg   Plan last modified Nicki Calvillo, RN (7/17/2017)   Next INR check 9/25/2017   Target end date     Indications   Long-term (current) use of anticoagulants [Z79.01] [Z79.01]  Atrial fibrillation (H) (Resolved) [I48.91]  Paroxysmal atrial fibrillation (H) (Resolved) [I48.0]         Anticoagulation Episode Summary     INR check location     Preferred lab     Send INR reminders to [facility] RANDI    Comments 2.5 mg tablets, pm dose. Alere home monitor      Anticoagulation Care Providers     Provider Role Specialty Phone number    Dheeraj Jj MD [facility] [phone]            See the Encounter Report to view Anticoagulation Flowsheet and Dosing Calendar (Go to Encounters tab in chart review, and find the Anticoagulation Therapy Visit)    Dosage adjustment made based on  physician directed care plan.      Zo Davis RN

## 2017-09-22 NOTE — TELEPHONE ENCOUNTER
Yesy Samaniego from  of [facility]. Patient would like this injection given in clinic.  Will route to nursing supervisor for approval.    Is this something we can order for her or would her pharmacy need to mail to use for every 2 weeks.      darbepoetin hira (ARANESP, ALBUMIN FREE,) 60 MCG/0.3ML injection 0.3 mL 99 9/20/2017  --      Sig: Inject 0.3 mLs (60 mcg) Subcutaneous every 14 days As needed for hgb<10g/dL.  If Hgb increases >1 point in 2 weeks (if blood transfusion given, use hgb PRIOR to this), SYSTOLIC BP > 180 mmHg or hgb>=10g/dL, HOLD DOSE. Dose must be within 1 week of Hgb.  Per anemia protocol with Vibha Barfield MD/Yesy Samaniego,PharmD [phone]       Please call PharmD for response.    Augustus Worthington, RN, BSN

## 2017-09-22 NOTE — PROGRESS NOTES
HPI:   Ms. Banks is a 77 year old female with a past medical history including HFpEF, Aflutter s/p ablation 17, CAD s/p CABG in , HTN, CKD Stage IV, obesity, ANABEL, and DM Type II. She underwent hospitalization at [facility] for heart failure exacerbation secondary to Cdif colitis and flash pulmonary edema from -9/15. She presents to clinic for post hospital follow up.     Her most recent echocardiogram 17 notes EF 60-65% and global RV function mild-moderately reduced. Ziopatch results remain pending.     She complains of persistent diarrhea, about 2 times daily since hospital discharge. She denies fever, chills, lightheadedness, dizziness, chest pain, palpitations, SOB, LEON, PND, orthopnea, nausea, vomiting, diarrhea, hematochezia, melena, or LE edema. Her weight remains stable at home around 169-170 lbs, which is a baseline per patient. She continues to follow a low sodium diet.        PAST MEDICAL HISTORY:  Past Medical History:   Diagnosis Date     Carpal tunnel syndrome      Coronary atherosclerosis of native coronary artery 2006    5 vessel CABG     OBSTRUCTIVE SLEEP APNEA     using CPAP     Osteoarthrosis, unspecified whether generalized or localized, unspecified site     generalized arthritis, particularly in her hands and feet         Other and combined forms of senile cataract     Bilateral     Other and unspecified hyperlipidemia      RESTLESS LEGS SYNDROME      TENOSYNOV HAND/WRIST NEC 2006     Type II or unspecified type diabetes mellitus without mention of complication, not stated as uncontrolled     Diabetes mellitus/pt is diet controlled with weight loss     Typical atrial flutter (H) 2007    ablation 2017     Unspecified essential hypertension        FAMILY HISTORY:  Family History   Problem Relation Age of Onset     DIABETES Father      AODM     Neurologic Disorder Father      Parkinson's     Blood Disease Father       from blood clot from leg to lung  immediately after hip fracture     DIABETES Paternal Grandmother      adult onset     DIABETES Maternal Grandmother      adult onset     Hypertension No family hx of      CEREBROVASCULAR DISEASE No family hx of        SOCIAL HISTORY:  Social History     Social History     Marital status:      Spouse name: Urbano Banks     Number of children: 2     Years of education: 13     Occupational History     Ministry coordinator      [employer]     Social History Main Topics     Smoking status: Former Smoker     Years: 10.00     Quit date: 1/1/1969     Smokeless tobacco: Never Used     Alcohol use 0.0 oz/week     0 Standard drinks or equivalent per week      Comment: occasional- 2 drinks per month     Drug use: No     Sexual activity: Yes     Birth control/ protection: Surgical     Other Topics Concern     Parent/Sibling W/ Cabg, Mi Or Angioplasty Before 65f 55m? No     Social History Narrative       CURRENT MEDICATIONS:  Outpatient Medications Prior to Visit   Medication Sig Dispense Refill     darbepoetin hira (ARANESP, ALBUMIN FREE,) 60 MCG/0.3ML injection Inject 0.3 mLs (60 mcg) Subcutaneous every 14 days As needed for hgb<10g/dL.  If Hgb increases >1 point in 2 weeks (if blood transfusion given, use hgb PRIOR to this), SYSTOLIC BP > 180 mmHg or hgb>=10g/dL, HOLD DOSE. Dose must be within 1 week of Hgb.  Per anemia protocol with Vibha Barfield MD/Yesy Samaniego,PharmD [phone] 0.3 mL 99     ferrous sulfate (IRON) 325 (65 FE) MG tablet Take 1 tablet (325 mg) by mouth daily (with breakfast) 100 tablet 1     pramipexole (MIRAPEX) 0.125 MG tablet Take 1 tablet (0.125 mg) by mouth 3 times daily 90 tablet 3     torsemide (DEMADEX) 20 MG tablet Take 2 tablets (40 mg) by mouth 2 times daily 30 tablet 3     Warfarin Sodium (COUMADIN PO) Take by mouth daily Take as directed per INR results       fluticasone (FLONASE) 50 MCG/ACT spray Spray 1 spray into both nostrils daily 1 Bottle 0      calcitRIOL (ROCALTROL) 0.5 MCG capsule Take 1 capsule (0.5 mcg) by mouth daily       Calcium Carb-Cholecalciferol 500-400 MG-UNIT TABS Take 1 tablet by mouth 2 times daily       ipratropium - albuterol 0.5 mg/2.5 mg/3 mL (DUONEB) 0.5-2.5 (3) MG/3ML neb solution Take 1 vial by nebulization 3 times daily       ACETAMINOPHEN PO Take 650 mg by mouth every 4 hours as needed for pain For pain 1-5 out of 10       TRAMADOL HCL PO Take 50 mg by mouth every 6 hours as needed for moderate to severe pain Pain of 6-10 out of 10       cyanocobalamin (VITAMIN B12) 1000 MCG/ML injection Inject 1 mL into the muscle every 30 days       aspirin 81 MG tablet Take 1 tablet (81 mg) by mouth daily 30 tablet 3     sodium bicarbonate 650 MG tablet Take 2 tablets (1,300 mg) by mouth 3 times daily       nitroGLYcerin (NITROSTAT) 0.4 MG sublingual tablet Place 1 tablet (0.4 mg) under the tongue every 5 minutes as needed for chest pain 25 tablet 0     gabapentin (NEURONTIN) 300 MG capsule Take 1 capsule (300 mg) by mouth At Bedtime 90 capsule      pravastatin (PRAVACHOL) 40 MG tablet Take 1 tablet (40 mg) by mouth daily 90 tablet 3     metoprolol (LOPRESSOR) 50 MG tablet Take 0.5 tablets (25 mg) by mouth 2 times daily 180 tablet 3     amLODIPine (NORVASC) 5 MG tablet Take 2 tablets (10 mg) by mouth daily 30 tablet 3     calcium acetate (PHOSLO) 667 MG CAPS capsule Take 1 capsule (667 mg) by mouth 3 times daily (with meals) 180 capsule      pentoxifylline (TRENTAL) 400 MG CR tablet Take 1 tablet (400 mg) by mouth daily       Lactobacillus (ACIDOPHILUS) tablet Take 1 tablet by mouth daily       ORDER FOR DME Equipment being ordered: cpap machine, mask, humidifier, tubing and filters 1 Device 0     metroNIDAZOLE (FLAGYL) 500 MG tablet Take 1 tablet (500 mg) by mouth every 8 hours 42 tablet 0     guaiFENesin-dextromethorphan (ROBITUSSIN DM) 100-10 MG/5ML syrup Take 5 mLs by mouth every 8 hours as needed for cough or congestion (Patient not taking:  "Reported on 9/21/2017) 560 mL 0     No facility-administered medications prior to visit.        ROS:   CONSTITUTIONAL: Denies fever, chills, fatigue, or weight fluctuations.   HEENT: Denies headache, vision changes, and changes in speech.   CV: Refer to HPI.   PULMONARY:Denies shortness of breath, cough, or previous TB exposure.   GI:Denies nausea, vomiting, and abdominal pain. Bowel movements are regular. Complains of diarrhea.   :Denies urinary alterations, dysuria, urinary frequency, hematuria, and abnormal drainage.   EXT:Denies lower extremity edema.   SKIN:Denies abnormal rashes or lesions.   MUSCULOSKELETAL:Denies upper or lower extremity weakness and pain.   NEUROLOGIC:Denies lightheadedness, dizziness, seizures, or upper or lower extremity paresthesia.     EXAM:  /77 (BP Location: Right arm, Patient Position: Chair, Cuff Size: Adult Regular)  Pulse 99  Ht 1.626 m (5' 4\")  Wt 77.1 kg (170 lb)  SpO2 98%  BMI 29.18 kg/m2 Note weight was not obtained, patient informed staff of weight.   GENERAL: Appears alert and oriented times three.   HEENT: Eye symmetrical and free of discharge bilaterally. Mucous membranes moist and without lesions.  NECK: Supple and without lymphadenopathy. JVD art clavicular line upright.   CV: RRR, S1S2 present without murmur, rub, or gallop.   RESPIRATORY: Respirations regular, even, and unlabored. Lungs CTA throughout.   GI: Soft and non distended with hyperactive bowel sounds present in all quadrants. No tenderness, rebound, guarding. No organomegaly.   EXTREMITIES: +1 bilateral LE edema. 2+ bilateral pedal pulses.   NEUROLOGIC: Alert and orientated x 3. CN II-XII grossly intact. No focal deficits.   MUSCULOSKELETAL: No joint swelling or tenderness.   SKIN: No jaundice. LE wrapped with serosanguinous drainage to dressing.     Labs:  CBC RESULTS:  Lab Results   Component Value Date    WBC 6.7 09/15/2017    RBC 2.75 (L) 09/15/2017    HGB 7.8 (L) 09/19/2017    HCT 25.0 (L) " 09/19/2017    MCV 84 09/15/2017    MCH 25.5 (L) 09/15/2017    MCHC 30.3 (L) 09/15/2017    RDW 17.7 (H) 09/15/2017     09/15/2017       CMP RESULTS:  Lab Results   Component Value Date     (H) 09/22/2017    POTASSIUM 4.8 09/22/2017    CHLORIDE 120 (H) 09/22/2017    CO2 15 (L) 09/22/2017    ANIONGAP 11 09/22/2017     (H) 09/22/2017    BUN 43 (H) 09/22/2017    CR 4.34 (H) 09/22/2017    GFRESTIMATED 10 (L) 09/22/2017    GFRESTBLACK 12 (L) 09/22/2017    MALICK 6.5 (L) 09/22/2017    BILITOTAL 0.2 08/27/2017    ALBUMIN 2.2 (L) 09/19/2017    ALKPHOS 292 (H) 08/26/2017    ALT 80 (H) 08/26/2017    AST 34 08/26/2017        INR RESULTS:  Lab Results   Component Value Date    INR 1.6 09/18/2017       Lab Results   Component Value Date    MAG 1.7 08/29/2017     Lab Results   Component Value Date    NTBNPI 22084 (H) 09/13/2017     Lab Results   Component Value Date    NTBNP 79028 (H) 09/22/2017       Assessment and Plan:   Ms. Banks is a 77 year old female with a past medical history including HFpEF, Aflutter s/p ablation 6/7/17, CAD s/p CABG in 2006, HTN, CKD Stage IV, obesity, ANABEL, and DM Type II. She underwent hospitalization at [facility] for heart failure exacerbation secondary to Cdif colitis and flash pulmonary edema from 9/13-9/15. She presents to clinic for post hospital follow up.     Heart failure with preserved ejection fraction.   NYHA Class C, AHA/ACC Stage  III  Rate control: HR-99.  volume status: Near euvolemic.   Blood pressure control: She notes BP elevated this AM due to pain in her LE ulcers. She assures me that her BP is stable per her C RN at home. Will obtain logs per Home care RN.   Aldosterone antagonist: Contraindicated in CKD.   Evaluation of coronary arteries: Inconclusive NM Lexiscan 3/4/14.  Sleep apnea screening: ANABEL on CPAP.     Aflutter s/p ablation.   - Zip patch pending per Destiny Deshpande NP.     HTN. She notes BP elevated this AM due to pain in her LE ulcers. She assures me  that her BP is stable per her HHC RN at home. Will obtain logs per Home care RN.   - Obtain 1 week log of BP per HHC RN.   - If remain elevated will increase BB. Continue Norvasc.     CAD. S/p CABG 2006.   - ASA, BB, and Pravachol.     CDIF. Persistent symptoms on Flagyl.   - D/C Flagyl.   - Initiate oral Vancomycin 125 mg po QID for 2 weeks.     CKD Stage IV.   - Management per Nephrology.   - Will notify Nephrology regarding Bicarb of 15. Continue Sodium Bicarb 1300 mg po TID at this time. Will likely improve with change in antibiotics with less diarrhea.     Follow up in [facility] clinic in 1 month.       Helen Plasencia  9/22/2017          CC  RUDY POST

## 2017-09-22 NOTE — NURSING NOTE
Chief Complaint   Patient presents with     Follow Up For     77 year old return [facility]; HFpEF presenting for labs and 2 week f/u

## 2017-09-22 NOTE — TELEPHONE ENCOUNTER
Reason for Call:  INR    Who is calling?  Home Care: Matt    Phone number:  [phone]    Name of caller: Leona    INR Value:  1.4    Are there any other concerns:  No    Can we leave a detailed message on this number? YES    Phone number can be reached at: Other phone number: [phone]      Call taken on 9/22/2017 at 3:54 PM by Tabitha Bland

## 2017-09-22 NOTE — TELEPHONE ENCOUNTER
Gave Kathryn update that we are still working on process to have aranesp in  clinic. Apparently we need to start a PA for in clinic administration however I am not sure what this means or how to go about it. Staff at  is looking into this.    F/u date: 9/26    Yesy Samaniego, PharmD, BCACP  [phone]  September 22, 2017

## 2017-09-25 ENCOUNTER — ANTICOAGULATION THERAPY VISIT (OUTPATIENT)
Dept: ANTICOAGULATION | Facility: CLINIC | Age: 77
End: 2017-09-25

## 2017-09-25 ENCOUNTER — TELEPHONE (OUTPATIENT)
Dept: INTERNAL MEDICINE | Facility: CLINIC | Age: 77
End: 2017-09-25

## 2017-09-25 DIAGNOSIS — Z79.01 LONG-TERM (CURRENT) USE OF ANTICOAGULANTS: ICD-10-CM

## 2017-09-25 LAB — INR PPP: 1.6

## 2017-09-25 NOTE — MR AVS SNAPSHOT
Kathryn MICKY Banks   9/25/2017   Anticoagulation Therapy Visit    Description:  77 year old female   Provider:  Dheeraj Jj MD   Department:  Ph Anticoag           INR as of 9/25/2017     Today's INR 1.6!      Anticoagulation Summary as of 9/25/2017     INR goal 2.0-3.0   Today's INR 1.6!   Full instructions 9/25: 2 mg; 9/26: 2 mg; 9/27: 1 mg   Next INR check 9/28/2017    Indications   Long-term (current) use of anticoagulants [Z79.01] [Z79.01]  Atrial fibrillation (H) (Resolved) [I48.91]  Paroxysmal atrial fibrillation (H) (Resolved) [I48.0]         Contact Numbers     Clinic Number:         September 2017 Details    Sun Mon Tue Wed Thu Fri Sat          1               2                 3               4               5               6               7               8               9                 10               11               12               13               14               15               16                 17               18               19               20               21               22               23                 24               25      2 mg   See details      26      2 mg         27      1 mg         28            29               30                Date Details   09/25 This INR check       Date of next INR:  9/28/2017         How to take your warfarin dose     To take:  1 mg Take 1 of the 1 mg tablets.    To take:  2 mg Take 2 of the 1 mg tablets.

## 2017-09-25 NOTE — TELEPHONE ENCOUNTER
Reason for Call:  INR    Who is calling?  Home Care    Phone number:  [phone]    Fax number:      Name of caller: Jsoephine    INR Value:  1.6    Are there any other concerns:  No    Can we leave a detailed message on this number? YES    Phone number patient can be reached at: Home number on file [phone] (home)      Call taken on 9/25/2017 at 1:21 PM by Sienna Lafleur

## 2017-09-25 NOTE — PROGRESS NOTES
ANTICOAGULATION FOLLOW-UP CLINIC VISIT    Patient Name:  Kathryn Banks  Date:  9/25/2017  Contact Type:  Telephone/ Janine, HC nurse    SUBJECTIVE:     Patient Findings     Positives Antibiotic use or infection (Is still taking Flagyl), Missed doses (Missed doses on Wed, Thurs), No Problem Findings    Comments S/O:  Janine from  home care calls with pt's INR today of 1.6.  Kathryn Banks is on Coumadin for A. Fib.  Current Coumadin dose is 2 mg Mon, 1 mg Tues, 0 mg Wed, 0 mg Thurs, 2 mg Fri, 2 mg Sat and 1 mg Sun=8 mg.   Concerns today are: None Noted.    A/P: Kathryn Banks's INR is Subtherapeutic  for his/her goal range of 2 - 3.  Reasons why INR is out of range may include:Missed doses Wed, Thurs.  Recommended to have Kathryn Banks increase Coumadin dose to 2 mg Mon, 2 mg Tue, 1 mg Wed (10 mg) and to have his/her INR rechecked in 3 days on 9/28/17.      Yovani Ruvalcaba RN, BSN               OBJECTIVE    INR   Date Value Ref Range Status   09/25/2017 1.6  Final       ASSESSMENT / PLAN  INR assessment SUB    Recheck INR In: 3 DAYS    INR Location Homecare INR      Anticoagulation Summary as of 9/25/2017     INR goal 2.0-3.0   Today's INR 1.6!   Maintenance plan No maintenance plan   Full instructions 9/25: 2 mg; 9/26: 2 mg; 9/27: 1 mg   Plan last modified Nicki Calvillo, RN (7/17/2017)   Next INR check 9/28/2017   Target end date     Indications   Long-term (current) use of anticoagulants [Z79.01] [Z79.01]  Atrial fibrillation (H) (Resolved) [I48.91]  Paroxysmal atrial fibrillation (H) (Resolved) [I48.0]         Anticoagulation Episode Summary     INR check location     Preferred lab     Send INR reminders to JUAN CARLOS BRYAN    Comments 2.5 mg tablets, pm dose. Alere home monitor      Anticoagulation Care Providers     Provider Role Specialty Phone number    Dheeraj Jj MD [facility] Internal Medicine [phone]            See the Encounter Report to view Anticoagulation Flowsheet and Dosing Calendar (Go  to Encounters tab in chart review, and find the Anticoagulation Therapy Visit)    Dosage adjustment made based on physician directed care plan.    Yovani Ruvalcaba RN

## 2017-09-26 ENCOUNTER — TELEPHONE (OUTPATIENT)
Dept: PHARMACY | Facility: CLINIC | Age: 77
End: 2017-09-26

## 2017-09-26 NOTE — TELEPHONE ENCOUNTER
Follow-up with anemia management service:    LM for Kathryn that I am still trying to determine what is needed to get process initiated for her to get aranesp at [facility]. So far only information I have is that PA is needed, however, I am unfamiliar with this process through Medical side of billing when administered in clinic. Billing at  said PA not needed for Medicare.  with clinics says it is. I am still not sure what I need to do, who to call, etc as this  I recommend she get her first dose from [facility] while this gets secured going forward at [facility] [place].    Follow-up call date: 9/29    [facility]    Anemia Management Service  Yesy Samaniego,Charles and Gretchen Brady CPhT  Phone: [phone]  Fax: [phone]

## 2017-09-27 ENCOUNTER — DOCUMENTATION ONLY (OUTPATIENT)
Dept: CARE COORDINATION | Facility: CLINIC | Age: 77
End: 2017-09-27

## 2017-09-27 ENCOUNTER — TELEPHONE (OUTPATIENT)
Dept: FAMILY MEDICINE | Facility: OTHER | Age: 77
End: 2017-09-27

## 2017-09-27 NOTE — PROGRESS NOTES
Ok to continue to have her weight monitored and may need more diuretics but I haven't see her recently enough to changes her diuretics.

## 2017-09-27 NOTE — TELEPHONE ENCOUNTER
Will close encounter at this time as Pharm D will follow up with order.  Augustus Worthington, RN, BSN

## 2017-09-27 NOTE — PROGRESS NOTES
[facility] utilizes an encounter to take the place of a direct phone call to your office. Please take a moment to review the below request. Your reply to this encounter will act as a verbal OK of orders if requested below. Thank you  Update on pt status.  Pts weight was 170 lbs this am according to her telehealth scale, up 5 lb from 160 lbs yesterday Tuesday 27/2017.  He BP was also low this am 97/52.  Telehealth instructed me to assess and up date you after my visit.  He BP during my visit was 104/64, heart rate 72, and oxygen sats of 93 % on room air.  She denied any dizzyness, no chest chest pain, no increase in edema of the LE was noted, Lungs CTA bilaterally.  He lower leg wounds look better than I have seen them in the past.  No other new concerns noted from todays visit.    Yesterday the pt did have a fall, she states her manual wheelchair tipped back and she fell onto her back hitting her head.  She did not have any loss or change in LOC, she states her posterior head was sore, there is no lumps or lacerations noted today.  She did not call emergency services or notify home care.  I will order her extra absorptive pads, Optilock pads, to better absorb her wound drainage on the lower legs, otherwise will continue with the use of Medihoney as the primary dressing.  Suhail Torrez RN cwocn

## 2017-09-28 ENCOUNTER — TELEPHONE (OUTPATIENT)
Dept: INTERNAL MEDICINE | Facility: CLINIC | Age: 77
End: 2017-09-28

## 2017-09-28 ENCOUNTER — ANTICOAGULATION THERAPY VISIT (OUTPATIENT)
Dept: ANTICOAGULATION | Facility: OTHER | Age: 77
End: 2017-09-28
Payer: COMMERCIAL

## 2017-09-28 DIAGNOSIS — Z79.01 LONG-TERM (CURRENT) USE OF ANTICOAGULANTS: ICD-10-CM

## 2017-09-28 DIAGNOSIS — N18.4 STAGE 4 CHRONIC KIDNEY DISEASE (H): ICD-10-CM

## 2017-09-28 LAB — INR PPP: 2.2

## 2017-09-28 PROCEDURE — 99207 ZZC NO CHARGE NURSE ONLY: CPT | Performed by: INTERNAL MEDICINE

## 2017-09-28 RX ORDER — SODIUM BICARBONATE 650 MG/1
TABLET ORAL
Qty: 90 TABLET | Refills: 0 | Status: ON HOLD | OUTPATIENT
Start: 2017-09-28 | End: 2017-10-18

## 2017-09-28 NOTE — TELEPHONE ENCOUNTER
sodium bicarbonate 650 MG tablet      Last Written Prescription Date:  8/18/17  Last Fill Quantity: ?,   # refills: ?  Last Office Visit with FMBEATRIZ, NICOLE or [facility] prescribing provider: 9/21/17  Future Office visit:    Next 5 appointments (look out 90 days)     Nov 20, 2017  1:30 PM CST   [facility]    [address] [place] [address] [phone]                   Routing refill request to provider for review/approval because:  Medication is reported/historical

## 2017-09-28 NOTE — PROGRESS NOTES
ANTICOAGULATION FOLLOW-UP CLINIC VISIT    Patient Name:  Kathryn Banks  Date:  9/28/2017  Contact Type:  Telephone/ Karly - homecare    SUBJECTIVE:     Patient Findings     Positives No Problem Findings    Comments Reason for Call:  INR     Who is calling?  Home Care:      Phone number:       Fax number:       Name of caller:      INR Value:  2.2     Are there any other concerns:  No     Can we leave a detailed message on this number? YES     Phone number patient can be reached at:      Call taken on 9/28/2017 at 11:43 AM by Amanda Saba           OBJECTIVE    INR   Date Value Ref Range Status   09/28/2017 2.2  Final       ASSESSMENT / PLAN  INR assessment THER    Recheck INR In: 4 DAYS    INR Location [facility] INR      Anticoagulation Summary as of 9/28/2017     INR goal 2.0-3.0   Today's INR 2.2   Maintenance plan No maintenance plan   Full instructions 9/28: 2 mg; 9/29: 2 mg; 9/30: 1 mg; 10/1: 2 mg   Plan last modified Nicki Calvillo RN (7/17/2017)   Next INR check 10/2/2017   Target end date     Indications   Long-term (current) use of anticoagulants [Z79.01] [Z79.01]  Atrial fibrillation (H) (Resolved) [I48.91]  Paroxysmal atrial fibrillation (H) (Resolved) [I48.0]         Anticoagulation Episode Summary     INR check location     Preferred lab     Send INR reminders to [facility]I    Comments 2.5 mg tablets, pm dose. Alere home monitor      Anticoagulation Care Providers     Provider Role Specialty Phone number    Dheeraj Jj MD [facility] Internal Medicine [phone]            See the Encounter Report to view Anticoagulation Flowsheet and Dosing Calendar (Go to Encounters tab in chart review, and find the Anticoagulation Therapy Visit)    Dosage adjustment made based on physician directed care plan.    Gianni Palumbo, RN

## 2017-09-28 NOTE — MR AVS SNAPSHOT
Kathryn WEEKS Jocelyn   9/28/2017   Anticoagulation Therapy Visit    Description:  77 year old female   Provider:  Dheeraj Jj MD   Department:  Er Anticoag           INR as of 9/28/2017     Today's INR 2.2      Anticoagulation Summary as of 9/28/2017     INR goal 2.0-3.0   Today's INR 2.2   Full instructions 9/28: 2 mg; 9/29: 2 mg; 9/30: 1 mg; 10/1: 2 mg   Next INR check 10/2/2017    Indications   Long-term (current) use of anticoagulants [Z79.01] [Z79.01]  Atrial fibrillation (H) (Resolved) [I48.91]  Paroxysmal atrial fibrillation (H) (Resolved) [I48.0]         Contact Numbers     Clinic Number:         September 2017 Details    Sun Mon Tue Wed Thu Fri Sat          1               2                 3               4               5               6               7               8               9                 10               11               12               13               14               15               16                 17               18               19               20               21               22               23                 24               25               26               27               28      2 mg   See details      29      2 mg         30      1 mg          Date Details   09/28 This INR check               How to take your warfarin dose     To take:  1 mg Take 1 of the 1 mg tablets.    To take:  2 mg Take 2 of the 1 mg tablets.           October 2017 Details    Sun Mon Tue Wed Thu Fri Sat     1      2 mg         2            3               4               5               6               7                 8               9               10               11               12               13               14                 15               16               17               18               19               20               21                 22               23               24               25               26               27               28                 29               30                31                    Date Details   No additional details    Date of next INR:  10/2/2017         How to take your warfarin dose     To take:  2 mg Take 2 of the 1 mg tablets.

## 2017-09-28 NOTE — TELEPHONE ENCOUNTER
Reason for Call:  INR    Who is calling?  Home Care:     Phone number:      Fax number:      Name of caller:     INR Value:  2.2    Are there any other concerns:  No    Can we leave a detailed message on this number? YES    Phone number patient can be reached at:     Call taken on 9/28/2017 at 11:43 AM by Amanda Saba

## 2017-09-29 NOTE — TELEPHONE ENCOUNTER
Had a conference call with [facility] billing and nursing at [place]. Kathryn will be able to have dose administered there. They still need appropriate provider to sign off then will order and contact patient for appt. I will f/u after dose given to provide patient with f/u plan. She has lab appt 10/4 so may be that she will get dose this day if needed. She sees Dr Barfield 10/11.    Call date: 10/3    Yesy Samaniego, PharmD, BCACP  [phone]  September 29, 2017

## 2017-10-01 ENCOUNTER — HOSPITAL ENCOUNTER (EMERGENCY)
Facility: CLINIC | Age: 77
Discharge: ANOTHER HOSPITAL | End: 2017-10-02
Attending: PHYSICIAN ASSISTANT | Admitting: PHYSICIAN ASSISTANT
Payer: MEDICARE

## 2017-10-01 ENCOUNTER — HOSPITAL ENCOUNTER (EMERGENCY)
Facility: CLINIC | Age: 77
Discharge: HOME | End: 2017-10-01
Attending: FAMILY MEDICINE | Admitting: FAMILY MEDICINE
Payer: MEDICARE

## 2017-10-01 ENCOUNTER — APPOINTMENT (OUTPATIENT)
Dept: CT IMAGING | Facility: CLINIC | Age: 77
End: 2017-10-01
Attending: FAMILY MEDICINE
Payer: MEDICARE

## 2017-10-01 ENCOUNTER — APPOINTMENT (OUTPATIENT)
Dept: GENERAL RADIOLOGY | Facility: CLINIC | Age: 77
End: 2017-10-01
Attending: FAMILY MEDICINE
Payer: MEDICARE

## 2017-10-01 VITALS
SYSTOLIC BLOOD PRESSURE: 90 MMHG | WEIGHT: 170.5 LBS | BODY MASS INDEX: 29.11 KG/M2 | OXYGEN SATURATION: 96 % | DIASTOLIC BLOOD PRESSURE: 48 MMHG | TEMPERATURE: 95.6 F | RESPIRATION RATE: 20 BRPM | HEIGHT: 64 IN

## 2017-10-01 DIAGNOSIS — J18.9 PNEUMONIA OF RIGHT LOWER LOBE DUE TO INFECTIOUS ORGANISM: ICD-10-CM
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
## 2017-10-01 DIAGNOSIS — L97.229: ICD-10-CM

## 2017-10-01 DIAGNOSIS — L97.219: ICD-10-CM

## 2017-10-01 DIAGNOSIS — Z87.891 PERSONAL HISTORY OF TOBACCO USE, PRESENTING HAZARDS TO HEALTH: ICD-10-CM

## 2017-10-01 DIAGNOSIS — I87.2 PERIPHERAL VENOUS INSUFFICIENCY: ICD-10-CM

## 2017-10-01 DIAGNOSIS — J18.9 PNEUMONIA, ORGANISM UNSPECIFIED(486): ICD-10-CM

## 2017-10-01 DIAGNOSIS — Z99.81 DEPENDENCE ON SUPPLEMENTAL OXYGEN: ICD-10-CM

## 2017-10-01 DIAGNOSIS — N18.5 CHRONIC KIDNEY DISEASE, STAGE V (H): ICD-10-CM

## 2017-10-01 DIAGNOSIS — L97.809 VENOUS STASIS ULCER OF OTHER PART OF LOWER LEG WITHOUT VARICOSE VEINS, UNSPECIFIED LATERALITY, UNSPECIFIED ULCER STAGE (H): ICD-10-CM

## 2017-10-01 DIAGNOSIS — I87.2 VENOUS STASIS ULCER OF OTHER PART OF LOWER LEG WITHOUT VARICOSE VEINS, UNSPECIFIED LATERALITY, UNSPECIFIED ULCER STAGE (H): ICD-10-CM

## 2017-10-01 DIAGNOSIS — N18.5 CKD (CHRONIC KIDNEY DISEASE) STAGE 5, GFR LESS THAN 15 ML/MIN (H): ICD-10-CM

## 2017-10-01 DIAGNOSIS — S42.115A CLOSED NONDISPLACED FRACTURE OF BODY OF LEFT SCAPULA, INITIAL ENCOUNTER: ICD-10-CM

## 2017-10-01 DIAGNOSIS — J18.9 PNEUMONIA OF LEFT LUNG DUE TO INFECTIOUS ORGANISM, UNSPECIFIED PART OF LUNG: ICD-10-CM

## 2017-10-01 DIAGNOSIS — A41.9 SEVERE SEPSIS (H): ICD-10-CM

## 2017-10-01 DIAGNOSIS — R65.20 SEVERE SEPSIS (H): ICD-10-CM

## 2017-10-01 LAB
ALBUMIN SERPL-MCNC: 2 G/DL (ref 3.4–5)
ALBUMIN UR-MCNC: 30 MG/DL
ALP SERPL-CCNC: 197 U/L (ref 40–150)
ALT SERPL W P-5'-P-CCNC: 16 U/L (ref 0–50)
ANION GAP SERPL CALCULATED.3IONS-SCNC: 14 MMOL/L (ref 3–14)
APPEARANCE UR: ABNORMAL
AST SERPL W P-5'-P-CCNC: 16 U/L (ref 0–45)
BACTERIA #/AREA URNS HPF: ABNORMAL /HPF
BASE DEFICIT BLDV-SCNC: 16.8 MMOL/L
BASOPHILS # BLD AUTO: 0.1 10E9/L (ref 0–0.2)
BASOPHILS NFR BLD AUTO: 0.9 %
BILIRUB SERPL-MCNC: 0.3 MG/DL (ref 0.2–1.3)
BILIRUB UR QL STRIP: NEGATIVE
BUN SERPL-MCNC: 60 MG/DL (ref 7–30)
CALCIUM SERPL-MCNC: 7 MG/DL (ref 8.5–10.1)
CHLORIDE SERPL-SCNC: 120 MMOL/L (ref 94–109)
CO2 SERPL-SCNC: 11 MMOL/L (ref 20–32)
COLOR UR AUTO: YELLOW
CREAT SERPL-MCNC: 5.23 MG/DL (ref 0.52–1.04)
DIFFERENTIAL METHOD BLD: ABNORMAL
EOSINOPHIL # BLD AUTO: 0.1 10E9/L (ref 0–0.7)
EOSINOPHIL NFR BLD AUTO: 1.2 %
ERYTHROCYTE [DISTWIDTH] IN BLOOD BY AUTOMATED COUNT: 19.2 % (ref 10–15)
GFR SERPL CREATININE-BSD FRML MDRD: 8 ML/MIN/1.7M2
GLUCOSE SERPL-MCNC: 323 MG/DL (ref 70–99)
GLUCOSE UR STRIP-MCNC: 50 MG/DL
HCO3 BLDV-SCNC: 11 MMOL/L (ref 21–28)
HCT VFR BLD AUTO: 26.6 % (ref 35–47)
HGB BLD-MCNC: 8.1 G/DL (ref 11.7–15.7)
HGB UR QL STRIP: ABNORMAL
IMM GRANULOCYTES # BLD: 0.1 10E9/L (ref 0–0.4)
IMM GRANULOCYTES NFR BLD: 1.2 %
KETONES UR STRIP-MCNC: NEGATIVE MG/DL
LACTATE BLD-SCNC: 1.6 MMOL/L (ref 0.7–2)
LEUKOCYTE ESTERASE UR QL STRIP: ABNORMAL
LYMPHOCYTES # BLD AUTO: 0.7 10E9/L (ref 0.8–5.3)
LYMPHOCYTES NFR BLD AUTO: 9.8 %
MCH RBC QN AUTO: 25.9 PG (ref 26.5–33)
MCHC RBC AUTO-ENTMCNC: 30.5 G/DL (ref 31.5–36.5)
MCV RBC AUTO: 85 FL (ref 78–100)
MONOCYTES # BLD AUTO: 0.4 10E9/L (ref 0–1.3)
MONOCYTES NFR BLD AUTO: 6.1 %
NEUTROPHILS # BLD AUTO: 5.4 10E9/L (ref 1.6–8.3)
NEUTROPHILS NFR BLD AUTO: 80.8 %
NITRATE UR QL: NEGATIVE
O2/TOTAL GAS SETTING VFR VENT: 21 %
PCO2 BLDV: 31 MM HG (ref 40–50)
PH BLDV: 7.15 PH (ref 7.32–7.43)
PH UR STRIP: 5 PH (ref 5–7)
PLATELET # BLD AUTO: 257 10E9/L (ref 150–450)
PO2 BLDV: 56 MM HG (ref 25–47)
POTASSIUM SERPL-SCNC: 5.6 MMOL/L (ref 3.4–5.3)
PROT SERPL-MCNC: 5.5 G/DL (ref 6.8–8.8)
RBC # BLD AUTO: 3.13 10E12/L (ref 3.8–5.2)
RBC #/AREA URNS AUTO: 11 /HPF (ref 0–2)
SODIUM SERPL-SCNC: 145 MMOL/L (ref 133–144)
SOURCE: ABNORMAL
SP GR UR STRIP: 1.01 (ref 1–1.03)
SQUAMOUS #/AREA URNS AUTO: 5 /HPF (ref 0–1)
UROBILINOGEN UR STRIP-MCNC: 0 MG/DL (ref 0–2)
WBC # BLD AUTO: 6.7 10E9/L (ref 4–11)
WBC #/AREA URNS AUTO: >182 /HPF (ref 0–2)
WBC CLUMPS #/AREA URNS HPF: PRESENT /HPF

## 2017-10-01 PROCEDURE — 36556 INSERT NON-TUNNEL CV CATH: CPT | Mod: Z6 | Performed by: EMERGENCY MEDICINE

## 2017-10-01 PROCEDURE — 93005 ELECTROCARDIOGRAM TRACING: CPT | Performed by: EMERGENCY MEDICINE

## 2017-10-01 PROCEDURE — 96366 THER/PROPH/DIAG IV INF ADDON: CPT | Mod: 59 | Performed by: EMERGENCY MEDICINE

## 2017-10-01 PROCEDURE — 25000128 H RX IP 250 OP 636: Performed by: PHYSICIAN ASSISTANT

## 2017-10-01 PROCEDURE — 83605 ASSAY OF LACTIC ACID: CPT | Performed by: EMERGENCY MEDICINE

## 2017-10-01 PROCEDURE — 99285 EMERGENCY DEPT VISIT HI MDM: CPT | Mod: 25 | Performed by: EMERGENCY MEDICINE

## 2017-10-01 PROCEDURE — 96361 HYDRATE IV INFUSION ADD-ON: CPT | Mod: 59 | Performed by: EMERGENCY MEDICINE

## 2017-10-01 PROCEDURE — 82803 BLOOD GASES ANY COMBINATION: CPT | Performed by: EMERGENCY MEDICINE

## 2017-10-01 PROCEDURE — 87040 BLOOD CULTURE FOR BACTERIA: CPT | Mod: 91 | Performed by: PHYSICIAN ASSISTANT

## 2017-10-01 PROCEDURE — 96368 THER/DIAG CONCURRENT INF: CPT | Mod: 59 | Performed by: EMERGENCY MEDICINE

## 2017-10-01 PROCEDURE — 80053 COMPREHEN METABOLIC PANEL: CPT | Performed by: EMERGENCY MEDICINE

## 2017-10-01 PROCEDURE — 36556 INSERT NON-TUNNEL CV CATH: CPT | Performed by: EMERGENCY MEDICINE

## 2017-10-01 PROCEDURE — 93010 ELECTROCARDIOGRAM REPORT: CPT | Mod: 59 | Performed by: EMERGENCY MEDICINE

## 2017-10-01 PROCEDURE — 81001 URINALYSIS AUTO W/SCOPE: CPT | Performed by: PHYSICIAN ASSISTANT

## 2017-10-01 PROCEDURE — 87086 URINE CULTURE/COLONY COUNT: CPT | Performed by: PHYSICIAN ASSISTANT

## 2017-10-01 PROCEDURE — 99291 CRITICAL CARE FIRST HOUR: CPT | Mod: 25 | Performed by: EMERGENCY MEDICINE

## 2017-10-01 PROCEDURE — 25000128 H RX IP 250 OP 636: Performed by: EMERGENCY MEDICINE

## 2017-10-01 PROCEDURE — 99284 EMERGENCY DEPT VISIT MOD MDM: CPT | Mod: Z6 | Performed by: FAMILY MEDICINE

## 2017-10-01 PROCEDURE — 96365 THER/PROPH/DIAG IV INF INIT: CPT | Mod: 59 | Performed by: EMERGENCY MEDICINE

## 2017-10-01 PROCEDURE — 85025 COMPLETE CBC W/AUTO DIFF WBC: CPT | Performed by: EMERGENCY MEDICINE

## 2017-10-01 PROCEDURE — 51702 INSERT TEMP BLADDER CATH: CPT | Performed by: EMERGENCY MEDICINE

## 2017-10-01 RX ORDER — LEVOFLOXACIN 500 MG/1
500 TABLET, FILM COATED ORAL EVERY OTHER DAY
Qty: 5 TABLET | Refills: 0 | Status: ON HOLD | OUTPATIENT
Start: 2017-10-03 | End: 2017-10-18

## 2017-10-01 RX ORDER — LIDOCAINE HYDROCHLORIDE 20 MG/ML
40 INJECTION, SOLUTION INFILTRATION; PERINEURAL ONCE
Status: DISCONTINUED | OUTPATIENT
Start: 2017-10-01 | End: 2017-10-02 | Stop reason: HOSPADM

## 2017-10-01 RX ORDER — ACETAMINOPHEN 500 MG
1000 TABLET ORAL ONCE
Status: COMPLETED | OUTPATIENT
Start: 2017-10-01 | End: 2017-10-01

## 2017-10-01 RX ORDER — LEVOFLOXACIN 250 MG/1
750 TABLET, FILM COATED ORAL ONCE
Status: COMPLETED | OUTPATIENT
Start: 2017-10-01 | End: 2017-10-01

## 2017-10-01 RX ORDER — LIDOCAINE 40 MG/G
CREAM TOPICAL
Status: DISCONTINUED | OUTPATIENT
Start: 2017-10-01 | End: 2017-10-02 | Stop reason: HOSPADM

## 2017-10-01 RX ORDER — SODIUM CHLORIDE, SODIUM LACTATE, POTASSIUM CHLORIDE, CALCIUM CHLORIDE 600; 310; 30; 20 MG/100ML; MG/100ML; MG/100ML; MG/100ML
INJECTION, SOLUTION INTRAVENOUS CONTINUOUS
Status: DISCONTINUED | OUTPATIENT
Start: 2017-10-01 | End: 2017-10-02 | Stop reason: HOSPADM

## 2017-10-01 RX ORDER — HYDROMORPHONE HYDROCHLORIDE 2 MG/1
2 TABLET ORAL ONCE
Status: COMPLETED | OUTPATIENT
Start: 2017-10-01 | End: 2017-10-01

## 2017-10-01 RX ADMIN — HYDROMORPHONE HYDROCHLORIDE 2 MG: 2 TABLET ORAL at 10:00

## 2017-10-01 RX ADMIN — LEVOFLOXACIN 750 MG: 250 TABLET, FILM COATED ORAL at 10:09

## 2017-10-01 RX ADMIN — SODIUM CHLORIDE 1000 ML: 9 INJECTION, SOLUTION INTRAVENOUS at 22:00

## 2017-10-01 RX ADMIN — ACETAMINOPHEN 1000 MG: 500 TABLET ORAL at 09:35

## 2017-10-01 RX ADMIN — TAZOBACTAM SODIUM AND PIPERACILLIN SODIUM 4.5 G: 500; 4 INJECTION, SOLUTION INTRAVENOUS at 23:02

## 2017-10-01 RX ADMIN — VANCOMYCIN HYDROCHLORIDE 1500 MG: 1 INJECTION, POWDER, LYOPHILIZED, FOR SOLUTION INTRAVENOUS at 23:12

## 2017-10-01 NOTE — DISCHARGE INSTRUCTIONS
I discussed your antibiotic dosing with pharmacy.  They recommend Levaquin 500 mg every other day.  We gave you the 1st dose in the emergency department so your next dose would be due on Tuesday, October 3.  This may affect your INR and your Coumadin dose may need to be adjusted.  Recheck in Coumadin clinic this week.  Ice may be helpful for your shoulder blade fracture.  Continue with the rest of your current medications.  Please return to the ED if you worsen or have any concerns.  Have your granddaughter reapply the special dressings that you have at home.  It was nice visiting with all of you this morning.  I hope you feel better soon.    Thank you for choosing [facility]. We appreciate the opportunity to meet your urgent medical needs. Please let us know if we could have done anything to make your stay more satisfying.    After discharge, please closely monitor for any new or worsening symptoms. Return to the Emergency Department if you develop any acute worsening signs or symptoms.    If you had lab work, cultures or imaging studies done during your stay, the final results may still be pending. We will call you if your plan of care needs to change. However, if you are not improving as expected, please follow up with your primary care provider or clinic.     Start any prescription medications that were prescribed to you and take them as directed.     Please see additional handouts that may be pertinent to your condition.

## 2017-10-01 NOTE — ED AVS SNAPSHOT
[facility] Emergency Department    [address] [place] [address]    Phone:  [phone]    Fax:  [phone]                                       Kathryn Banks   MRN: [id number]    Department:  [facility] Emergency Department   Date of Visit:  10/1/2017           Patient Information     Date Of Birth          1940        Your diagnoses for this visit were:     Pneumonia of left lung due to infectious organism, unspecified part of lung     Closed nondisplaced fracture of body of left scapula, initial encounter     Venous stasis ulcer of other part of lower leg without varicose veins, unspecified laterality, unspecified ulcer stage (H) bilateral       You were seen by Karan Dallas MD.      Follow-up Information     Follow up with Dheeraj Jj MD.    Specialty:  Internal Medicine    Contact information:    [facility]  [address] [place] [address]  [phone]          Discharge Instructions       I discussed your antibiotic dosing with pharmacy.  They recommend Levaquin 500 mg every other day.  We gave you the 1st dose in the emergency department so your next dose would be due on Tuesday, October 3.  This may affect your INR and your Coumadin dose may need to be adjusted.  Recheck in Coumadin clinic this week.  Ice may be helpful for your shoulder blade fracture.  Continue with the rest of your current medications.  Please return to the ED if you worsen or have any concerns.  Have your granddaughter reapply the special dressings that you have at home.  It was nice visiting with all of you this morning.  I hope you feel better soon.    Thank you for choosing [facility]. We appreciate the opportunity to meet your urgent medical needs. Please let us know if we could have done anything to make your stay more satisfying.    After discharge, please closely monitor for any new or worsening symptoms. Return to the Emergency  Department if you develop any acute worsening signs or symptoms.    If you had lab work, cultures or imaging studies done during your stay, the final results may still be pending. We will call you if your plan of care needs to change. However, if you are not improving as expected, please follow up with your primary care provider or clinic.     Start any prescription medications that were prescribed to you and take them as directed.     Please see additional handouts that may be pertinent to your condition.        Future Appointments        Provider Department Dept Phone Center    10/4/2017 11:00 AM NL LAB [facility] [phone] [facility]    10/11/2017 12:00 PM Dheeraj Jj MD [facility] [phone] [facility]    10/24/2017 2:00 PM NL LAB [facility] [phone] [facility]    10/26/2017 2:30 PM Destiny Berrios NP [facility] [phone] [facility] PSA CLIN    11/2/2017 2:00 PM Cassie Cardenas MD [facility] Vascular [facility] [phone] [facility]    11/20/2017 12:20 PM First Floor Lab [facility] [phone] [place]    11/20/2017 1:30 PM Vibha Barfield MD [facility] [phone] [place]      24 Hour Appointment Hotline       To make an appointment at any [facility], call [phone] ([phone]). If you don't have a family doctor or clinic, we will help you find one. [facility] are conveniently located to serve the needs of you and your family.             Review of your medicines      START taking        Dose / Directions Last dose taken    levofloxacin 500 MG tablet   Commonly known as:  LEVAQUIN   Dose:  500 mg   Quantity:  5 tablet   Start taking on:  10/3/2017        Take 1 tablet (500 mg) by mouth every other day for 5 doses   Refills:  0          Our records show that you are taking the medicines listed below. If these are incorrect, please  call your family doctor or clinic.        Dose / Directions Last dose taken    ACETAMINOPHEN PO   Dose:  650 mg        Take 650 mg by mouth every 4 hours as needed for pain For pain 1-5 out of 10   Refills:  0        acidophilus tablet   Dose:  1 tablet        Take 1 tablet by mouth daily   Refills:  0        amLODIPine 5 MG tablet   Commonly known as:  NORVASC   Dose:  10 mg   Quantity:  30 tablet        Take 2 tablets (10 mg) by mouth daily   Refills:  3        aspirin 81 MG tablet   Dose:  81 mg   Quantity:  30 tablet        Take 1 tablet (81 mg) by mouth daily   Refills:  3        calcitRIOL 0.5 MCG capsule   Commonly known as:  ROCALTROL   Dose:  0.5 mcg        Take 1 capsule (0.5 mcg) by mouth daily   Refills:  0        calcium acetate 667 MG Caps capsule   Commonly known as:  PHOSLO   Dose:  667 mg   Quantity:  180 capsule        Take 1 capsule (667 mg) by mouth 3 times daily (with meals)   Refills:  0        Calcium Carb-Cholecalciferol 500-400 MG-UNIT Tabs   Dose:  1 tablet        Take 1 tablet by mouth 2 times daily   Refills:  0        COUMADIN PO        Take by mouth daily Take as directed per INR results   Refills:  0        cyanocobalamin 1000 MCG/ML injection   Commonly known as:  VITAMIN B12   Dose:  1 mL        Inject 1 mL into the muscle every 30 days   Refills:  0        darbepoetin hira 60 MCG/0.3ML injection   Commonly known as:  ARANESP (ALBUMIN FREE)   Dose:  60 mcg   Quantity:  0.3 mL        Inject 0.3 mLs (60 mcg) Subcutaneous every 14 days As needed for hgb<10g/dL.  If Hgb increases >1 point in 2 weeks (if blood transfusion given, use hgb PRIOR to this), SYSTOLIC BP > 180 mmHg or hgb>=10g/dL, HOLD DOSE. Dose must be within 1 week of Hgb.  Per anemia protocol with Vibha Barfield MD/Yesy Samaniego,PharmD [phone]   Refills:  99        ferrous sulfate 325 (65 FE) MG tablet   Commonly known as:  IRON   Dose:  325 mg   Quantity:  100 tablet        Take 1 tablet (325 mg) by mouth daily (with  breakfast)   Refills:  1        fluticasone 50 MCG/ACT spray   Commonly known as:  FLONASE   Dose:  1 spray   Quantity:  1 Bottle        Spray 1 spray into both nostrils daily   Refills:  0        gabapentin 300 MG capsule   Commonly known as:  NEURONTIN   Dose:  300 mg   Quantity:  90 capsule        Take 1 capsule (300 mg) by mouth At Bedtime   Refills:  0        guaiFENesin-dextromethorphan 100-10 MG/5ML syrup   Commonly known as:  ROBITUSSIN DM   Dose:  5 mL   Quantity:  560 mL        Take 5 mLs by mouth every 8 hours as needed for cough or congestion   Refills:  0        ipratropium - albuterol 0.5 mg/2.5 mg/3 mL 0.5-2.5 (3) MG/3ML neb solution   Commonly known as:  DUONEB   Dose:  1 vial        Take 1 vial by nebulization 3 times daily   Refills:  0        metoprolol 50 MG tablet   Commonly known as:  LOPRESSOR   Dose:  25 mg   Quantity:  180 tablet        Take 0.5 tablets (25 mg) by mouth 2 times daily   Refills:  3        nitroGLYcerin 0.4 MG sublingual tablet   Commonly known as:  NITROSTAT   Dose:  0.4 mg   Quantity:  25 tablet        Place 1 tablet (0.4 mg) under the tongue every 5 minutes as needed for chest pain   Refills:  0        order for DME   Quantity:  1 Device        Equipment being ordered: cpap machine, mask, humidifier, tubing and filters   Refills:  0        pentoxifylline 400 MG CR tablet   Commonly known as:  TRENtal   Dose:  400 mg        Take 1 tablet (400 mg) by mouth daily   Refills:  0        pramipexole 0.125 MG tablet   Commonly known as:  MIRAPEX   Dose:  0.125 mg   Quantity:  90 tablet        Take 1 tablet (0.125 mg) by mouth 3 times daily   Refills:  3        pravastatin 40 MG tablet   Commonly known as:  PRAVACHOL   Dose:  40 mg   Quantity:  90 tablet        Take 1 tablet (40 mg) by mouth daily   Refills:  3        * sodium bicarbonate 650 MG tablet   Dose:  1300 mg        Take 2 tablets (1,300 mg) by mouth 3 times daily   Refills:  0        * sodium bicarbonate 650 MG tablet    Quantity:  90 tablet        TAKE ONE TABLET BY MOUTH THREE TIMES DAILY   Refills:  0        torsemide 20 MG tablet   Commonly known as:  DEMADEX   Dose:  40 mg   Quantity:  30 tablet        Take 2 tablets (40 mg) by mouth 2 times daily   Refills:  3        TRAMADOL HCL PO   Dose:  50 mg        Take 50 mg by mouth every 6 hours as needed for moderate to severe pain Pain of 6-10 out of 10   Refills:  0        vancomycin 50 mg/mL Liqd solution   Commonly known as:  VANOCIN   Dose:  125 mg   Quantity:  140 mL        Take 2.5 mLs (125 mg) by mouth 4 times daily for 14 days   Refills:  0        * Notice:  This list has 2 medication(s) that are the same as other medications prescribed for you. Read the directions carefully, and ask your doctor or other care provider to review them with you.            Prescriptions were sent or printed at these locations (1 Prescription)                   [facility] [address] [place] [address] [address]   [address] [facility] [address]    Telephone:  [phone]   Fax:  [phone]   Hours:                  E-Prescribed (1 of 1)         levofloxacin (LEVAQUIN) 500 MG tablet                Procedures and tests performed during your visit     Head CT w/o contrast    Shoulder XR, G/E 3 views, left    XR Chest 2 Views      Orders Needing Specimen Collection     None      Pending Results     Date and Time Order Name Status Description    10/1/2017 0907 XR Chest 2 Views Preliminary             Pending Culture Results     No orders found from 9/29/2017 to 10/2/2017.            Pending Results Instructions     If you had any lab results that were not finalized at the time of your Discharge, you can call the ED Lab Result RN at [phone]. You will be contacted by this team for any positive Lab results or changes in treatment. The nurses are available 7 days a week from 10A to 6:30P.  You can leave a message 24 hours per day and they will return your call.        Thank you for  "choosing [facility]       Thank you for choosing [facility] for your care. Our goal is always to provide you with excellent care. Hearing back from our patients is one way we can continue to improve our services. Please take a few minutes to complete the written survey that you may receive in the mail after you visit with us. Thank you!        [employer] â€“ [employer] Information     [employer] lets you send messages to your doctor, view your test results, renew your prescriptions, schedule appointments and more. To sign up, go to [email or web address] . Click on \"Log in\" on the left side of the screen, which will take you to the Welcome page. Then click on \"Sign up Now\" on the right side of the page.     You will be asked to enter the access code listed below, as well as some personal information. Please follow the directions to create your username and password.     Your access code is: [email or web address]  Expires: 2017  4:04 PM     Your access code will  in 90 days. If you need help or a new code, please call your [facility] clinic or [phone].        Care EveryWhere ID     This is your Care EveryWhere ID. This could be used by other organizations to access your [facility] medical records  [id number]        Equal Access to Services     CAROLINE BLAND : Tl Garcia, wacatarinoda ankit, qaybta kaalmada shayan, tay murillo. So [facility] [phone].    ATENCIÓN: Si habla español, tiene a medrano disposición servicios gratuitos de asistencia lingüística. Llame al [phone].    We comply with applicable federal civil rights laws and [place] laws. We do not discriminate on the basis of race, color, national origin, age, disability, sex, sexual orientation, or gender identity.            After Visit Summary       This is your record. Keep this with you and show to your community pharmacist(s) and doctor(s) at your next visit.                  "

## 2017-10-01 NOTE — ED PROVIDER NOTES
History     Chief Complaint   Patient presents with     Fall     leg ulcers     HPI  Kathryn Banks is a 77 year old female who presents to the ED this morning with several concerns.  5 days ago, she was reaching for something on the table and her wheelchair fell over backwards.  They think it's defective.  Not clear how she would've fallen backwards if she was reaching forwards but that's what happened.  She fell with the chair and struck the back of her head and her left shoulder.  She did not lose consciousness.  Thinking is maybe been just a little bit off this week.  No nausea or vomiting.  She has pain over the before meals joint and the superior shoulder.  No pain down into the arm itself.    She also has leg ulcers.  Wound care nurse was out last week and they're putting some special dressings on it.  These dressings have been soaking up some of the drainage.  She denies any fevers.    She was hospitalized a couple of weeks ago at the [facility] for 2 days with subacute respiratory failure secondary to pulmonary edema, chronic kidney failure, C. diff colitis, and the leg ulcers.    Scheduled to see vascular surgery in early November in regards to vascular access for eventual dialysis.      Past Medical History:   Diagnosis Date     Carpal tunnel syndrome      Coronary atherosclerosis of native coronary artery 2006    5 vessel CABG     OBSTRUCTIVE SLEEP APNEA     using CPAP     Osteoarthrosis, unspecified whether generalized or localized, unspecified site     generalized arthritis, particularly in her hands and feet         Other and combined forms of senile cataract 2000    Bilateral     Other and unspecified hyperlipidemia      RESTLESS LEGS SYNDROME      TENOSYNOV HAND/WRIST NEC 6/30/2006     Type II or unspecified type diabetes mellitus without mention of complication, not stated as uncontrolled 2001    Diabetes mellitus/pt is diet controlled with weight loss     Typical atrial flutter  (H) 01/17/2007    ablation 6/7/2017     Unspecified essential hypertension        Past Surgical History:   Procedure Laterality Date     ANGIOPLASTY       C APPENDECTOMY       C CABG, VEIN, FIVE  12/06    SVG x 4 and LIMA to LAD     C SOTO W/O FACETEC FORAMOT/DSKC 1/2 VRT SEG, LUMBAR  1968     C REMV CATARACT INTRACAP,INSERT LENS      Bilateral     C TOTAL ABDOM HYSTERECTOMY  1995     - fibroids     C VAGOTOMY/PYLOROPLASTY,SELECT  1970     COLONOSCOPY  12/3/2007     COLONOSCOPY  1/17/2014    Procedure: COLONOSCOPY;  Colonoscopy;  Surgeon: Zack Stearns MD;  Location:  GI     CYSTOSCOPY  11/25/09    [facility]     ENDOSCOPY  03/21/2000    Upper GI     HC COLONOSCOPY THRU STOMA, DIAGNOSTIC  10/7/04    poor prep, repeat in 2-3 years     HC COLONOSCOPY W/WO BRUSH/WASH  10/31/07    Repeat-poor quality prep     HC REMOVAL OF OVARY/TUBE(S)      Salpingo-Oophorectomy, Unilateral     HC REVISE MEDIAN N/CARPAL TUNNEL SURG      Carpal tunnel release     HC UGI ENDOSCOPY DIAG W BIOPSY  10/31/07     HC UGI ENDOSCOPY, SIMPLE EXAM  12/3/2007     OPEN REDUCTION INTERNAL FIXATION HIP NAILING Left 7/3/2016    Procedure: OPEN REDUCTION INTERNAL FIXATION HIP NAILING;  Surgeon: Lake Schulz MD;  Location:  OR       Social History     Social History     Marital status:      Spouse name: Urbano Banks     Number of children: 2     Years of education: 13     Occupational History     Ministry coordinator      [employer]     Social History Main Topics     Smoking status: Former Smoker     Years: 10.00     Quit date: 1/1/1969     Smokeless tobacco: Never Used     Alcohol use 0.0 oz/week     0 Standard drinks or equivalent per week      Comment: 1/2 a beer once a month     Drug use: No     Sexual activity: Yes     Birth control/ protection: Surgical     Other Topics Concern     Parent/Sibling W/ Cabg, Mi Or Angioplasty Before 65f 55m? No     Social History Narrative          Allergies   Allergen  "Reactions     Ciprofloxacin Nausea and Vomiting     Zofran did not help     Oxycodone Visual Disturbance     Delusions, blackouts      Lisinopril Cough     Sulfa Drugs GI Disturbance     LOSS OF TASTE       Med List Reviewed       I have reviewed the Medications, Allergies, Past Medical and Surgical History, and Social History in the Epic system.         Review of Systems   All other systems reviewed and are negative.      Physical Exam   BP: 102/58  Heart Rate: 61  Temp: 95.3  F (35.2  C)  Resp: 20  Height: 162.6 cm (5' 4\")  Weight: 77.3 kg (170 lb 8 oz)  SpO2: (!) 88 %  Physical Exam   Constitutional: She is oriented to person, place, and time. She appears well-developed. No distress.   HENT:   Head: Normocephalic and atraumatic.   Eyes: EOM are normal. Pupils are equal, round, and reactive to light.   Pulmonary/Chest: Effort normal. No respiratory distress.           Breathing easily while lying flat.   Musculoskeletal:        Left shoulder: She exhibits tenderness (AC joint and deltoid) and swelling (over the AC joint). She exhibits normal range of motion (PROM is not painful) and no deformity.        Left elbow: Normal.        Left upper arm: Normal.        Arms:  Neurological: She is alert and oriented to person, place, and time. She is not disoriented. No cranial nerve deficit. GCS eye subscore is 4. GCS verbal subscore is 5. GCS motor subscore is 6.   Skin: Lesion (venous stasis ulcers in the legs.  See photos below.  They look better than they have and do not appear infected.) noted.               ED Course      She is on Coumadin so we will get a CT of her head to rule out a sub-dural hematoma .  Left shoulder x-ray ordered.  The leg ulcers were redressed with gauze to cover them as they tend to burn when exposed to air.  Her granddaughter cares for her and will reapply the special dressings when they get back home.      Shoulder x-ray shows nondisplaced fracture of the inferior scapula.  She is tender in " "this area and would be consistent with her falling backwards.  Incidental finding of a possible infiltrate.  She states she has been coughing a little bit but hadn't raise that complained earlier.  She uses oxygen at night but not during the day.  Chest x-ray ordered to further evaluate.      Her head CT showed no acute intracranial process.    Chest x-ray did show basilar infiltrate.  We discussed keeping her in the hospital she sat upright and loudly stated, \"NO, I DO NOT WANT TO STAY IN THE HOSPITAL!\"  .    She asked for something for pain.  She has tolerated Dilaudid orally in the past and was given a 2 mg tablet with good effect.  Last time she took it was a few weeks ago.    She was discharged to home and asked to follow-up if she worsens.  She feels comfortable going home.       ED Course     Procedures          Results for orders placed or performed during the hospital encounter of 10/01/17 (from the past 24 hour(s))   Head CT w/o contrast    Narrative    CT OF THE HEAD WITHOUT CONTRAST 10/1/2017 8:29 AM     COMPARISON: Head CT 4/28/2017    HISTORY: Fell, hit head five days ago, thinking a little off,  on  Coumadin. Rule out subdural hematoma.     TECHNIQUE: 5 mm thick axial CT images of the head were acquired  without IV contrast material.    FINDINGS: There is mild diffuse cerebral volume loss. There are subtle  patchy areas of decreased density in the cerebral white matter  bilaterally that are consistent with sequela of chronic small vessel  ischemic disease.    The ventricles and basal cisterns are within normal limits in  configuration given the degree of cerebral volume loss. There is no  midline shift. There are no extra-axial fluid collections.    No intracranial hemorrhage, mass or recent infarct.    The visualized paranasal sinuses are well-aerated. There is no  mastoiditis. There are no fractures of the visualized bones.      Impression    IMPRESSION: Diffuse cerebral volume loss and cerebral " white matter  changes consistent with chronic small vessel ischemic disease. No  evidence for acute intracranial pathology.        Radiation dose for this scan was reduced using automated exposure  control, adjustment of the mA and/or kV according to patient size, or  iterative reconstruction technique    BEL ZHU MD   Shoulder XR, G/E 3 views, left    Narrative    SHOULDER TWO VIEWS LEFT  10/1/2017 8:35 AM     HISTORY: Pain    COMPARISON: None.      Impression    IMPRESSION: No definite glenohumeral dislocation. Transscapular Y view  demonstrates a probable fracture of the inferior scapula. No humeral  fracture. Incidental note is made of a left lower lobe infiltrate.    IMPRESSION:  1. Suspected disc mildly angulated fracture of the inferior scapula.  2. Incidental note is made of a left lower lobe infiltrate. Consider  chest x-ray for complete characterization.    BARRY TAO MD   XR Chest 2 Views    Narrative    CHEST TWO VIEWS 10/1/2017 9:18 AM     COMPARISON: Two-view chest of 9/13/2017    HISTORY: Possible infiltrate noted on shoulder x-ray. Cough.      Impression    IMPRESSION: Median sternotomy changes again noted. Obscuration of the  left hemidiaphragm by a combination of pleural effusion and airspace  opacity again noted. Pneumonia must be considered. The upper left lung  is clear. The right lung and right pleural space are normal. There is  no pneumothorax. Heart size is mildly enlarged, but there is no  evidence for overt congestive failure or pulmonary edema. There is a  probable hiatal hernia.    BEL ZHU MD     *Note: Due to a large number of results and/or encounters for the requested time period, some results have not been displayed. A complete set of results can be found in Results Review.          Assessments & Plan (with Medical Decision Making)    (J18.9) Pneumonia of left lung due to infectious organism, unspecified part of lung  Comment:   Plan: Levaquin dosed after discussion  with pharmacy.  They recommend 750 mg today then 500 mg every other day.  I offered to keep her in the hospital but she sat straight up in bed and adamantly refused to stay in the hospital.    (S42.115A) Closed nondisplaced fracture of body of left scapula, initial encounter  Comment:   Plan: Ice, continue current pain medications.  Treatment is pain control.    (I87.2,  L91.365) Venous stasis ulcer of other part of lower leg without varicose veins, unspecified laterality, unspecified ulcer stage (H)  Comment: bilateral  Plan: Her granddaughter will redress her wounds when she gets home.  Follow-up with wound care.            I have reviewed the nursing notes.    I have reviewed the findings, diagnosis, plan and need for follow up with the patient.       Discharge Medication List as of 10/1/2017 10:23 AM      START taking these medications    Details   levofloxacin (LEVAQUIN) 500 MG tablet Take 1 tablet (500 mg) by mouth every other day for 5 doses, Disp-5 tablet, R-0, E-PrescribeDosing discussed with pharmacy.  She had her first dose in the ED today, 10/1/2017.  Next dose due 10/3/17.             Final diagnoses:   Pneumonia of left lung due to infectious organism, unspecified part of lung   Closed nondisplaced fracture of body of left scapula, initial encounter   Venous stasis ulcer of other part of lower leg without varicose veins, unspecified laterality, unspecified ulcer stage (H) - bilateral       10/1/2017   [facility] EMERGENCY DEPARTMENT     Karan Dallas MD  10/01/17 1903

## 2017-10-01 NOTE — ED NOTES
Patient fell backwards on Tuesday while reaching for something while sitting in her w/c and injured left upper arm.  Also dealing with reagan leg ulcers and was recently in the hospital for kidney failure.

## 2017-10-02 ENCOUNTER — CARE COORDINATION (OUTPATIENT)
Dept: CARE COORDINATION | Facility: CLINIC | Age: 77
End: 2017-10-02

## 2017-10-02 ENCOUNTER — HOSPITAL ENCOUNTER (INPATIENT)
Facility: CLINIC | Age: 77
LOS: 16 days | Discharge: NURSING, REHAB OR LONG-TERM CARE | DRG: 871 | End: 2017-10-18
Attending: INTERNAL MEDICINE | Admitting: INTERNAL MEDICINE
Payer: MEDICARE

## 2017-10-02 ENCOUNTER — APPOINTMENT (OUTPATIENT)
Dept: GENERAL RADIOLOGY | Facility: CLINIC | Age: 77
DRG: 871 | End: 2017-10-02
Attending: INTERNAL MEDICINE
Payer: MEDICARE

## 2017-10-02 ENCOUNTER — APPOINTMENT (OUTPATIENT)
Dept: GENERAL RADIOLOGY | Facility: CLINIC | Age: 77
End: 2017-10-02
Attending: EMERGENCY MEDICINE
Payer: MEDICARE

## 2017-10-02 ENCOUNTER — APPOINTMENT (OUTPATIENT)
Dept: GENERAL RADIOLOGY | Facility: CLINIC | Age: 77
End: 2017-10-02
Attending: PHYSICIAN ASSISTANT
Payer: MEDICARE

## 2017-10-02 VITALS
DIASTOLIC BLOOD PRESSURE: 49 MMHG | OXYGEN SATURATION: 93 % | RESPIRATION RATE: 18 BRPM | TEMPERATURE: 90.1 F | SYSTOLIC BLOOD PRESSURE: 73 MMHG | HEART RATE: 50 BPM

## 2017-10-02 DIAGNOSIS — F41.9 ANXIETY: ICD-10-CM

## 2017-10-02 DIAGNOSIS — I48.91 ATRIAL FIBRILLATION WITH RAPID VENTRICULAR RESPONSE (H): ICD-10-CM

## 2017-10-02 DIAGNOSIS — N18.5 CKD (CHRONIC KIDNEY DISEASE) STAGE 5, GFR LESS THAN 15 ML/MIN (H): Primary | ICD-10-CM

## 2017-10-02 PROBLEM — A41.9 SEPTIC SHOCK (H): Status: ACTIVE | Noted: 2017-10-02

## 2017-10-02 PROBLEM — R65.21 SEPTIC SHOCK (H): Status: ACTIVE | Noted: 2017-10-02

## 2017-10-02 LAB
ANION GAP SERPL CALCULATED.3IONS-SCNC: 11 MMOL/L (ref 3–14)
ANION GAP SERPL CALCULATED.3IONS-SCNC: 12 MMOL/L (ref 3–14)
ANION GAP SERPL CALCULATED.3IONS-SCNC: 13 MMOL/L (ref 3–14)
BASE DEFICIT BLDV-SCNC: 16.7 MMOL/L
BASE DEFICIT BLDV-SCNC: 17.7 MMOL/L
BUN SERPL-MCNC: 58 MG/DL (ref 7–30)
BUN SERPL-MCNC: 59 MG/DL (ref 7–30)
BUN SERPL-MCNC: 61 MG/DL (ref 7–30)
C DIFF TOX B STL QL: NEGATIVE
CALCIUM SERPL-MCNC: 6.5 MG/DL (ref 8.5–10.1)
CALCIUM SERPL-MCNC: 6.5 MG/DL (ref 8.5–10.1)
CALCIUM SERPL-MCNC: 6.7 MG/DL (ref 8.5–10.1)
CHLORIDE SERPL-SCNC: 119 MMOL/L (ref 94–109)
CHLORIDE SERPL-SCNC: 119 MMOL/L (ref 94–109)
CHLORIDE SERPL-SCNC: 121 MMOL/L (ref 94–109)
CO2 SERPL-SCNC: 14 MMOL/L (ref 20–32)
CO2 SERPL-SCNC: 14 MMOL/L (ref 20–32)
CO2 SERPL-SCNC: 15 MMOL/L (ref 20–32)
CORTIS SERPL-MCNC: 18 UG/DL (ref 4–22)
CREAT SERPL-MCNC: 5.15 MG/DL (ref 0.52–1.04)
CREAT SERPL-MCNC: 5.17 MG/DL (ref 0.52–1.04)
CREAT SERPL-MCNC: 5.24 MG/DL (ref 0.52–1.04)
CREAT UR-MCNC: 46 MG/DL
ERYTHROCYTE [DISTWIDTH] IN BLOOD BY AUTOMATED COUNT: 19.4 % (ref 10–15)
ERYTHROCYTE [DISTWIDTH] IN BLOOD BY AUTOMATED COUNT: 19.4 % (ref 10–15)
FRACT EXCRET NA UR+SERPL-RTO: 3.7 %
GFR SERPL CREATININE-BSD FRML MDRD: 8 ML/MIN/1.7M2
GLUCOSE BLDC GLUCOMTR-MCNC: 125 MG/DL (ref 70–99)
GLUCOSE BLDC GLUCOMTR-MCNC: 146 MG/DL (ref 70–99)
GLUCOSE BLDC GLUCOMTR-MCNC: 169 MG/DL (ref 70–99)
GLUCOSE BLDC GLUCOMTR-MCNC: 196 MG/DL (ref 70–99)
GLUCOSE BLDC GLUCOMTR-MCNC: 223 MG/DL (ref 70–99)
GLUCOSE BLDC GLUCOMTR-MCNC: 54 MG/DL (ref 70–99)
GLUCOSE BLDC GLUCOMTR-MCNC: 62 MG/DL (ref 70–99)
GLUCOSE BLDC GLUCOMTR-MCNC: 66 MG/DL (ref 70–99)
GLUCOSE BLDC GLUCOMTR-MCNC: 86 MG/DL (ref 70–99)
GLUCOSE SERPL-MCNC: 128 MG/DL (ref 70–99)
GLUCOSE SERPL-MCNC: 163 MG/DL (ref 70–99)
GLUCOSE SERPL-MCNC: 72 MG/DL (ref 70–99)
GRAM STN SPEC: ABNORMAL
GRAM STN SPEC: ABNORMAL
HCO3 BLDV-SCNC: 12 MMOL/L (ref 21–28)
HCO3 BLDV-SCNC: 13 MMOL/L (ref 21–28)
HCO3 BLDV-SCNC: 14 MMOL/L (ref 21–28)
HCO3 BLDV-SCNC: 14 MMOL/L (ref 21–28)
HCT VFR BLD AUTO: 24.7 % (ref 35–47)
HCT VFR BLD AUTO: 25.5 % (ref 35–47)
HGB BLD-MCNC: 7.9 G/DL (ref 11.7–15.7)
HGB BLD-MCNC: 8.1 G/DL (ref 11.7–15.7)
INR PPP: 2.37 (ref 0.86–1.14)
INR PPP: 5.71 (ref 0.86–1.14)
INR PPP: 6 (ref 0.86–1.14)
LACTATE BLD-SCNC: 0.8 MMOL/L (ref 0.7–2)
LACTATE BLD-SCNC: 1 MMOL/L (ref 0.7–2)
MCH RBC QN AUTO: 26.2 PG (ref 26.5–33)
MCH RBC QN AUTO: 26.3 PG (ref 26.5–33)
MCHC RBC AUTO-ENTMCNC: 31.8 G/DL (ref 31.5–36.5)
MCHC RBC AUTO-ENTMCNC: 32 G/DL (ref 31.5–36.5)
MCV RBC AUTO: 82 FL (ref 78–100)
MCV RBC AUTO: 83 FL (ref 78–100)
MRSA DNA SPEC QL NAA+PROBE: NEGATIVE
OXYHGB MFR BLDV: 59 %
OXYHGB MFR BLDV: 63 %
OXYHGB MFR BLDV: 65 %
OXYHGB MFR BLDV: 66 %
PCO2 BLDV: 42 MM HG (ref 40–50)
PCO2 BLDV: 45 MM HG (ref 40–50)
PCO2 BLDV: 46 MM HG (ref 40–50)
PCO2 BLDV: 50 MM HG (ref 40–50)
PH BLDV: 7.04 PH (ref 7.32–7.43)
PH BLDV: 7.05 PH (ref 7.32–7.43)
PH BLDV: 7.06 PH (ref 7.32–7.43)
PH BLDV: 7.09 PH (ref 7.32–7.43)
PLATELET # BLD AUTO: 300 10E9/L (ref 150–450)
PLATELET # BLD AUTO: 307 10E9/L (ref 150–450)
PO2 BLDV: 39 MM HG (ref 25–47)
PO2 BLDV: 43 MM HG (ref 25–47)
PO2 BLDV: 43 MM HG (ref 25–47)
PO2 BLDV: 46 MM HG (ref 25–47)
POTASSIUM SERPL-SCNC: 4.8 MMOL/L (ref 3.4–5.3)
POTASSIUM SERPL-SCNC: 4.9 MMOL/L (ref 3.4–5.3)
POTASSIUM SERPL-SCNC: 5.1 MMOL/L (ref 3.4–5.3)
POTASSIUM SERPL-SCNC: 5.5 MMOL/L (ref 3.4–5.3)
PROCALCITONIN SERPL-MCNC: 0.24 NG/ML
RBC # BLD AUTO: 3.01 10E12/L (ref 3.8–5.2)
RBC # BLD AUTO: 3.08 10E12/L (ref 3.8–5.2)
SODIUM SERPL-SCNC: 146 MMOL/L (ref 133–144)
SODIUM UR-SCNC: 48 MMOL/L
SPECIMEN SOURCE: ABNORMAL
SPECIMEN SOURCE: NORMAL
SPECIMEN SOURCE: NORMAL
WBC # BLD AUTO: 10.1 10E9/L (ref 4–11)
WBC # BLD AUTO: 11.6 10E9/L (ref 4–11)

## 2017-10-02 PROCEDURE — 25000128 H RX IP 250 OP 636: Performed by: INTERNAL MEDICINE

## 2017-10-02 PROCEDURE — A9270 NON-COVERED ITEM OR SERVICE: HCPCS | Performed by: INTERNAL MEDICINE

## 2017-10-02 PROCEDURE — 82805 BLOOD GASES W/O2 SATURATION: CPT | Performed by: INTERNAL MEDICINE

## 2017-10-02 PROCEDURE — A9270 NON-COVERED ITEM OR SERVICE: HCPCS | Mod: GY | Performed by: INTERNAL MEDICINE

## 2017-10-02 PROCEDURE — 80048 BASIC METABOLIC PNL TOTAL CA: CPT | Performed by: INTERNAL MEDICINE

## 2017-10-02 PROCEDURE — 80202 ASSAY OF VANCOMYCIN: CPT | Performed by: INTERNAL MEDICINE

## 2017-10-02 PROCEDURE — 85610 PROTHROMBIN TIME: CPT | Performed by: INTERNAL MEDICINE

## 2017-10-02 PROCEDURE — 25000128 H RX IP 250 OP 636: Performed by: SURGERY

## 2017-10-02 PROCEDURE — 99213 OFFICE O/P EST LOW 20 MIN: CPT

## 2017-10-02 PROCEDURE — 36556 INSERT NON-TUNNEL CV CATH: CPT | Performed by: INTERNAL MEDICINE

## 2017-10-02 PROCEDURE — 99292 CRITICAL CARE ADDL 30 MIN: CPT | Mod: 25 | Performed by: INTERNAL MEDICINE

## 2017-10-02 PROCEDURE — 96366 THER/PROPH/DIAG IV INF ADDON: CPT | Performed by: EMERGENCY MEDICINE

## 2017-10-02 PROCEDURE — 87205 SMEAR GRAM STAIN: CPT | Performed by: INTERNAL MEDICINE

## 2017-10-02 PROCEDURE — 00000146 ZZHCL STATISTIC GLUCOSE BY METER IP

## 2017-10-02 PROCEDURE — 25000128 H RX IP 250 OP 636: Performed by: PHYSICIAN ASSISTANT

## 2017-10-02 PROCEDURE — 96365 THER/PROPH/DIAG IV INF INIT: CPT | Mod: 59 | Performed by: EMERGENCY MEDICINE

## 2017-10-02 PROCEDURE — 5A1D70Z PERFORMANCE OF URINARY FILTRATION, INTERMITTENT, LESS THAN 6 HOURS PER DAY: ICD-10-PCS | Performed by: INTERNAL MEDICINE

## 2017-10-02 PROCEDURE — 71010 XR CHEST PORT 1 VW: CPT

## 2017-10-02 PROCEDURE — 82805 BLOOD GASES W/O2 SATURATION: CPT | Performed by: SURGERY

## 2017-10-02 PROCEDURE — 87070 CULTURE OTHR SPECIMN AEROBIC: CPT | Performed by: INTERNAL MEDICINE

## 2017-10-02 PROCEDURE — 90937 HEMODIALYSIS REPEATED EVAL: CPT

## 2017-10-02 PROCEDURE — 87493 C DIFF AMPLIFIED PROBE: CPT | Performed by: SURGERY

## 2017-10-02 PROCEDURE — 25000131 ZZH RX MED GY IP 250 OP 636 PS 637: Mod: GY | Performed by: INTERNAL MEDICINE

## 2017-10-02 PROCEDURE — 80048 BASIC METABOLIC PNL TOTAL CA: CPT | Performed by: SURGERY

## 2017-10-02 PROCEDURE — 84145 PROCALCITONIN (PCT): CPT | Performed by: INTERNAL MEDICINE

## 2017-10-02 PROCEDURE — 99207 ZZC APP CREDIT; MD BILLING SHARED VISIT: CPT | Performed by: SURGERY

## 2017-10-02 PROCEDURE — 87106 FUNGI IDENTIFICATION YEAST: CPT | Performed by: INTERNAL MEDICINE

## 2017-10-02 PROCEDURE — P9041 ALBUMIN (HUMAN),5%, 50ML: HCPCS | Performed by: SURGERY

## 2017-10-02 PROCEDURE — P9041 ALBUMIN (HUMAN),5%, 50ML: HCPCS | Performed by: INTERNAL MEDICINE

## 2017-10-02 PROCEDURE — 97602 WOUND(S) CARE NON-SELECTIVE: CPT

## 2017-10-02 PROCEDURE — 25000132 ZZH RX MED GY IP 250 OP 250 PS 637: Mod: GY | Performed by: INTERNAL MEDICINE

## 2017-10-02 PROCEDURE — 87640 STAPH A DNA AMP PROBE: CPT | Performed by: INTERNAL MEDICINE

## 2017-10-02 PROCEDURE — 82533 TOTAL CORTISOL: CPT | Performed by: SURGERY

## 2017-10-02 PROCEDURE — 83605 ASSAY OF LACTIC ACID: CPT | Performed by: INTERNAL MEDICINE

## 2017-10-02 PROCEDURE — 40000986 XR CHEST PORT 1 VW

## 2017-10-02 PROCEDURE — 84300 ASSAY OF URINE SODIUM: CPT | Performed by: SURGERY

## 2017-10-02 PROCEDURE — 25000125 ZZHC RX 250: Performed by: SURGERY

## 2017-10-02 PROCEDURE — 87641 MR-STAPH DNA AMP PROBE: CPT | Performed by: INTERNAL MEDICINE

## 2017-10-02 PROCEDURE — 85610 PROTHROMBIN TIME: CPT | Performed by: SURGERY

## 2017-10-02 PROCEDURE — 87077 CULTURE AEROBIC IDENTIFY: CPT | Performed by: INTERNAL MEDICINE

## 2017-10-02 PROCEDURE — 40000275 ZZH STATISTIC RCP TIME EA 10 MIN

## 2017-10-02 PROCEDURE — 84132 ASSAY OF SERUM POTASSIUM: CPT | Performed by: INTERNAL MEDICINE

## 2017-10-02 PROCEDURE — 3E043XZ INTRODUCTION OF VASOPRESSOR INTO CENTRAL VEIN, PERCUTANEOUS APPROACH: ICD-10-PCS | Performed by: INTERNAL MEDICINE

## 2017-10-02 PROCEDURE — 25800025 ZZH RX 258: Performed by: SURGERY

## 2017-10-02 PROCEDURE — 99291 CRITICAL CARE FIRST HOUR: CPT | Performed by: INTERNAL MEDICINE

## 2017-10-02 PROCEDURE — 25800025 ZZH RX 258: Performed by: INTERNAL MEDICINE

## 2017-10-02 PROCEDURE — 82570 ASSAY OF URINE CREATININE: CPT | Performed by: SURGERY

## 2017-10-02 PROCEDURE — 25000125 ZZHC RX 250: Performed by: PHYSICIAN ASSISTANT

## 2017-10-02 PROCEDURE — 85027 COMPLETE CBC AUTOMATED: CPT | Performed by: SURGERY

## 2017-10-02 PROCEDURE — 96367 TX/PROPH/DG ADDL SEQ IV INF: CPT | Performed by: EMERGENCY MEDICINE

## 2017-10-02 PROCEDURE — 25000125 ZZHC RX 250: Performed by: INTERNAL MEDICINE

## 2017-10-02 PROCEDURE — 20000003 ZZH R&B ICU

## 2017-10-02 RX ORDER — ALBUMIN, HUMAN INJ 5% 5 %
12.5 SOLUTION INTRAVENOUS ONCE
Status: COMPLETED | OUTPATIENT
Start: 2017-10-02 | End: 2017-10-02

## 2017-10-02 RX ORDER — PIPERACILLIN SODIUM, TAZOBACTAM SODIUM 2; .25 G/10ML; G/10ML
2.25 INJECTION, POWDER, LYOPHILIZED, FOR SOLUTION INTRAVENOUS EVERY 6 HOURS
Status: DISCONTINUED | OUTPATIENT
Start: 2017-10-02 | End: 2017-10-05

## 2017-10-02 RX ORDER — DEXTROSE MONOHYDRATE 25 G/50ML
25-50 INJECTION, SOLUTION INTRAVENOUS
Status: DISCONTINUED | OUTPATIENT
Start: 2017-10-02 | End: 2017-10-18 | Stop reason: HOSPADM

## 2017-10-02 RX ORDER — NICOTINE POLACRILEX 4 MG
15-30 LOZENGE BUCCAL
Status: DISCONTINUED | OUTPATIENT
Start: 2017-10-02 | End: 2017-10-18 | Stop reason: HOSPADM

## 2017-10-02 RX ORDER — NALOXONE HYDROCHLORIDE 0.4 MG/ML
.1-.4 INJECTION, SOLUTION INTRAMUSCULAR; INTRAVENOUS; SUBCUTANEOUS
Status: DISCONTINUED | OUTPATIENT
Start: 2017-10-02 | End: 2017-10-18 | Stop reason: HOSPADM

## 2017-10-02 RX ORDER — LEVOFLOXACIN 5 MG/ML
500 INJECTION, SOLUTION INTRAVENOUS
Status: DISCONTINUED | OUTPATIENT
Start: 2017-10-03 | End: 2017-10-02

## 2017-10-02 RX ORDER — FUROSEMIDE 10 MG/ML
120 INJECTION INTRAMUSCULAR; INTRAVENOUS ONCE
Status: COMPLETED | OUTPATIENT
Start: 2017-10-02 | End: 2017-10-02

## 2017-10-02 RX ORDER — HYDROMORPHONE HYDROCHLORIDE 2 MG/1
2 TABLET ORAL
Status: DISCONTINUED | OUTPATIENT
Start: 2017-10-02 | End: 2017-10-03

## 2017-10-02 RX ORDER — FUROSEMIDE 10 MG/ML
60 INJECTION INTRAMUSCULAR; INTRAVENOUS ONCE
Status: COMPLETED | OUTPATIENT
Start: 2017-10-02 | End: 2017-10-02

## 2017-10-02 RX ORDER — ALBUTEROL SULFATE 0.83 MG/ML
2.5 SOLUTION RESPIRATORY (INHALATION) EVERY 4 HOURS PRN
Status: DISCONTINUED | OUTPATIENT
Start: 2017-10-02 | End: 2017-10-18 | Stop reason: HOSPADM

## 2017-10-02 RX ORDER — ALBUMIN, HUMAN INJ 5% 5 %
250 SOLUTION INTRAVENOUS
Status: DISCONTINUED | OUTPATIENT
Start: 2017-10-02 | End: 2017-10-03

## 2017-10-02 RX ORDER — SODIUM CHLORIDE 9 MG/ML
1000 INJECTION, SOLUTION INTRAVENOUS CONTINUOUS
Status: DISCONTINUED | OUTPATIENT
Start: 2017-10-02 | End: 2017-10-02 | Stop reason: HOSPADM

## 2017-10-02 RX ORDER — DEXTROSE MONOHYDRATE 25 G/50ML
25 INJECTION, SOLUTION INTRAVENOUS ONCE
Status: COMPLETED | OUTPATIENT
Start: 2017-10-02 | End: 2017-10-02

## 2017-10-02 RX ORDER — ALBUMIN, HUMAN INJ 5% 5 %
250 SOLUTION INTRAVENOUS
Status: COMPLETED | OUTPATIENT
Start: 2017-10-02 | End: 2017-10-02

## 2017-10-02 RX ADMIN — PIPERACILLIN SODIUM,TAZOBACTAM SODIUM 2.25 G: 2; .25 INJECTION, POWDER, FOR SOLUTION INTRAVENOUS at 20:26

## 2017-10-02 RX ADMIN — Medication 250 MG: at 11:06

## 2017-10-02 RX ADMIN — ALBUMIN (HUMAN) 12.5 G: 12.5 SOLUTION INTRAVENOUS at 11:46

## 2017-10-02 RX ADMIN — PHYTONADIONE 5 MG: 10 INJECTION, EMULSION INTRAMUSCULAR; INTRAVENOUS; SUBCUTANEOUS at 06:47

## 2017-10-02 RX ADMIN — HYDROCORTISONE SODIUM SUCCINATE 50 MG: 100 INJECTION, POWDER, FOR SOLUTION INTRAMUSCULAR; INTRAVENOUS at 23:32

## 2017-10-02 RX ADMIN — SODIUM CHLORIDE 3 UNITS/HR: 9 INJECTION, SOLUTION INTRAVENOUS at 08:47

## 2017-10-02 RX ADMIN — SODIUM CHLORIDE 1000 ML: 9 INJECTION, SOLUTION INTRAVENOUS at 01:17

## 2017-10-02 RX ADMIN — ALBUMIN (HUMAN) 12.5 G: 12.5 SOLUTION INTRAVENOUS at 16:13

## 2017-10-02 RX ADMIN — SODIUM CHLORIDE 1.5 UNITS/HR: 9 INJECTION, SOLUTION INTRAVENOUS at 06:13

## 2017-10-02 RX ADMIN — NOREPINEPHRINE BITARTRATE 0.16 MCG/KG/MIN: 1 INJECTION INTRAVENOUS at 09:06

## 2017-10-02 RX ADMIN — VASOPRESSIN 2.4 UNITS/HR: 20 INJECTION INTRAVENOUS at 13:45

## 2017-10-02 RX ADMIN — PIPERACILLIN SODIUM,TAZOBACTAM SODIUM 2.25 G: 2; .25 INJECTION, POWDER, FOR SOLUTION INTRAVENOUS at 13:55

## 2017-10-02 RX ADMIN — Medication 50 MEQ: at 06:13

## 2017-10-02 RX ADMIN — PIPERACILLIN SODIUM,TAZOBACTAM SODIUM 2.25 G: 2; .25 INJECTION, POWDER, FOR SOLUTION INTRAVENOUS at 08:51

## 2017-10-02 RX ADMIN — PHYTONADIONE: 10 INJECTION, EMULSION INTRAMUSCULAR; INTRAVENOUS; SUBCUTANEOUS at 12:32

## 2017-10-02 RX ADMIN — Medication: at 12:12

## 2017-10-02 RX ADMIN — DEXTROSE MONOHYDRATE 25 ML: 25 INJECTION, SOLUTION INTRAVENOUS at 12:09

## 2017-10-02 RX ADMIN — FUROSEMIDE 60 MG: 10 INJECTION, SOLUTION INTRAVENOUS at 06:13

## 2017-10-02 RX ADMIN — SODIUM CHLORIDE 1000 ML: 9 INJECTION, SOLUTION INTRAVENOUS at 00:20

## 2017-10-02 RX ADMIN — LIDOCAINE HYDROCHLORIDE: 20 JELLY TOPICAL at 14:40

## 2017-10-02 RX ADMIN — Medication 250 MG: at 18:46

## 2017-10-02 RX ADMIN — ALBUMIN (HUMAN) 250 ML: 12.5 SOLUTION INTRAVENOUS at 23:16

## 2017-10-02 RX ADMIN — HYDROCORTISONE SODIUM SUCCINATE 50 MG: 100 INJECTION, POWDER, FOR SOLUTION INTRAMUSCULAR; INTRAVENOUS at 17:53

## 2017-10-02 RX ADMIN — NOREPINEPHRINE BITARTRATE 0.12 MCG/KG/MIN: 1 INJECTION INTRAVENOUS at 04:36

## 2017-10-02 RX ADMIN — FUROSEMIDE 120 MG: 10 INJECTION, SOLUTION INTRAVENOUS at 21:20

## 2017-10-02 RX ADMIN — DEXTROSE MONOHYDRATE 25 ML: 25 INJECTION, SOLUTION INTRAVENOUS at 11:43

## 2017-10-02 RX ADMIN — SODIUM CHLORIDE, POTASSIUM CHLORIDE, SODIUM LACTATE AND CALCIUM CHLORIDE 500 ML: 600; 310; 30; 20 INJECTION, SOLUTION INTRAVENOUS at 16:51

## 2017-10-02 RX ADMIN — Medication 50 MEQ: at 12:06

## 2017-10-02 RX ADMIN — Medication 250 MG: at 23:32

## 2017-10-02 RX ADMIN — HYDROMORPHONE HYDROCHLORIDE 2 MG: 2 TABLET ORAL at 06:13

## 2017-10-02 RX ADMIN — NOREPINEPHRINE BITARTRATE 0.03 MCG/KG/MIN: 1 INJECTION INTRAVENOUS at 01:09

## 2017-10-02 ASSESSMENT — PAIN DESCRIPTION - DESCRIPTORS: DESCRIPTORS: ACHING;BURNING

## 2017-10-02 NOTE — PHARMACY-VANCOMYCIN DOSING SERVICE
Pharmacy Vancomycin Initial Note  Date of Service 2017  Patient's  1940  77 year old, female    Indication: [facility] Pneumonia    Current estimated CrCl = Estimated Creatinine Clearance: 9.3 mL/min (based on Cr of 5.23).    Creatinine for last 3 days  10/1/2017:  9:45 PM Creatinine 5.23 mg/dL    Recent Vancomycin Level(s) for last 3 days  No results found for requested labs within last 72 hours.      Vancomycin IV Administrations (past 72 hours)                   vancomycin (VANCOCIN) 1500 mg in 0.9% NaCl 250 mL PREMIX (mg) 1,500 mg New Bag 10/01/17 2312                Nephrotoxins and other renal medications (Future)    Start     Dose/Rate Route Frequency Ordered Stop    10/02/17 0800  piperacillin-tazobactam (ZOSYN) 2.25 g vial to attach to  ml bag      2.25 g  over 30 Minutes Intravenous EVERY 6 HOURS 10/02/17 0317      10/02/17 0338  vancomycin place maki - receiving intermittent dosing      1 each Does not apply SEE ADMIN INSTRUCTIONS 10/02/17 0339      10/02/17 033  norepinephrine (LEVOPHED) 16 mg in D5W 250 mL infusion      0.03-0.4 mcg/kg/min × 77.3 kg  2.2-29 mL/hr  Intravenous CONTINUOUS 10/02/17 0317      10/02/17 033  vasopressin (PITRESSIN) 40 Units in D5W 40 mL infusion      2.4 Units/hr  2.4 mL/hr  Intravenous CONTINUOUS 10/02/17 0317            Contrast Orders - past 72 hours     None                Plan:  1.  Start vancomycin  1500 mg IV once   2.  Goal Trough Level: 15-20 mg/L   3.  Pharmacy will check trough levels as appropriate in 1-3 Days.    4. Serum creatinine levels will be ordered daily for the first week of therapy and at least twice weekly for subsequent weeks.    5. [place] method utilized to dose vancomycin therapy: Method 1    Vikas Osborne

## 2017-10-02 NOTE — ED NOTES
Pt was seen here this morning and left AMA.  Was diagnosed with Pneumonia.  Pt now c/o weakness, wheezing and SOB.

## 2017-10-02 NOTE — PHARMACY-VANCOMYCIN DOSING SERVICE
Pharmacy Vancomycin Initial Note  Date of Service 2017  Patient's  1940  77 year old, female    Indication: Sepsis    Current estimated CrCl = Estimated Creatinine Clearance: 9.3 mL/min (based on Cr of 5.23).    Creatinine for last 3 days  10/1/2017:  9:45 PM Creatinine 5.23 mg/dL    Recent Vancomycin Level(s) for last 3 days  No results found for requested labs within last 72 hours.      Vancomycin IV Administrations (past 72 hours)      No vancomycin orders with administrations in past 72 hours.                Nephrotoxins and other renal medications (Future)    Start     Dose/Rate Route Frequency Ordered Stop    10/01/17 2241  vancomycin (VANCOCIN) 1500 mg in 0.9% NaCl 250 mL PREMIX      1,500 mg  166.7 mL/hr over 90 Minutes Intravenous ONCE 10/01/17 2240      10/01/17 2240  vancomycin place maki - receiving intermittent dosing      1 each Does not apply SEE ADMIN INSTRUCTIONS 10/01/17 2240      10/01/17 2222  piperacillin-tazobactam (ZOSYN) intermittent infusion 4.5 g      4.5 g  200 mL/hr over 30 Minutes Intravenous ONCE 10/01/17 222            Contrast Orders - past 72 hours     None                Plan:  1.  Start vancomycin  1500 mg IV once, Pharmacy to follow levels and dose when needed.  2.  Goal Trough Level: 15-20 mg/L   3.  Pharmacy will check trough levels as appropriate in 1-3 Days.    4. Serum creatinine levels will be ordered daily for the first week of therapy and at least twice weekly for subsequent weeks.    5. [place] method utilized to dose vancomycin therapy: Method 1    Urbano Pan

## 2017-10-02 NOTE — IP AVS SNAPSHOT
` [facility] CARDIAC SPECIALTY CARE: [phone]            Medication Administration Report for Kathryn Banks as of 10/18/17 1626   Legend:    Given Hold Not Given Due Canceled Entry Other Actions    Time Time (Time) Time  Time-Action       Inactive    Active    Linked        Medications 10/12/17 10/13/17 10/14/17 10/15/17 10/16/17 10/17/17 10/18/17    - MEDICATION INSTRUCTIONS for Dialysis Patients -  Freq: SEE ADMIN INSTRUCTIONS Route: XX  Start: 10/02/17 2250   Admin Instructions: Do not give any medication that may affect blood pressure or volume before dialysis.   The following medications may be removed by dialysis and should be given after dialysis: Metoprolol               acetaminophen (TYLENOL) tablet 325-650 mg  Dose: 325-650 mg Freq: EVERY 6 HOURS PRN Route: PO  PRN Reasons: mild pain,fever  Start: 10/03/17 0945   Admin Instructions: Maximum acetaminophen dose from all sources = 75 mg/kg/day not to exceed 4 grams/day.               albuterol neb solution 2.5 mg  Dose: 2.5 mg Freq: EVERY 4 HOURS PRN Route: NEBULIZATION  PRN Reason: wheezing  Start: 10/02/17 0314              amiodarone (PACERONE/CODARONE) tablet 400 mg  Dose: 400 mg Freq: 2 TIMES DAILY Route: PO  Start: 10/17/17 0900   Admin Instructions: Hold for SBP < 70 or symptomatic per Dr. Peter  Avoid grapefruit juice during oral amiodarone treatment.          0924 (400 mg)-Given       2125 (400 mg)-Given        1223 (400 mg)-Given       [ ] 2100           cholecalciferol (vitamin D3) capsule CAPS 5,000 Units  Dose: 5,000 Units Freq: DAILY Route: PO  Start: 10/04/17 1100    0823 (5,000 Units)-Given        1317 (5,000 Units)-Given        0808 (5,000 Units)-Given        0801 (5,000 Units)-Given        1211 (5,000 Units)-Given        0924 (5,000 Units)-Given        0829 (5,000 Units)-Given           clonazePAM (klonoPIN) half-tab 0.25 mg  Dose: 0.25 mg Freq: 2 TIMES DAILY PRN Route: PO  PRN Reason: anxiety  Start: 10/07/17 0821      0021 (0.25 mg)-Given       0743 (0.25 mg)-Given       2106 (0.25 mg)-Given        2120 (0.25 mg)-Given        0336 (0.25 mg)-Given       2137 (0.25 mg)-Given        0831 (0.25 mg)-Given       2128 (0.25 mg)-Given        2150 (0.25 mg)-Given        0829 (0.25 mg)-Given           dextrose 10 % 1,000 mL infusion  Freq: CONTINUOUS PRN Route: IV  PRN Comment: Give if on IV Insulin Infusion, and Parenteral or Enteral nutrition held or cycled off.   Start: 10/02/17 0458   Admin Instructions: Infuse IV D10W at TPN/TF rate whenever nutrition is held or cycled off.               doxercalciferol (HECTOROL) injection 2 mcg  Dose: 2 mcg Freq: SEE ADMIN INSTRUCTIONS Route: IV  Start: 10/18/17 0709   Admin Instructions: Given during each dialysis (per request)           0938 (2 mcg)-Given [C]           glucose 40 % gel 15-30 g  Dose: 15-30 g Freq: EVERY 15 MIN PRN Route: PO  PRN Reason: low blood sugar  Start: 10/02/17 0458   Admin Instructions: Give 15 g for BG 51 to 69 mg/dL IF patient is conscious and able to swallow. Give 30 g for BG less than or equal to 50 mg/dL IF patient is conscious and able to swallow. Do NOT give glucose gel via enteral tube.  IF patient has enteral tube: give apple juice 120 mL (4 oz or 15 g of CHO) via enteral tube for BG 51 to 69 mg/dL.  Give apple juice 240 mL (8 oz or 30 g of CHO) via enteral tube for BG less than or equal to 50 mg/dL.    ~Oral gel is preferable for conscious and able to swallow patient.   ~IF gel unavailable or patient refuses may provide apple juice 120 mL (4 oz or 15 g of CHO). Document juice on I and O flowsheet.              Or  dextrose 50 % injection 25-50 mL  Dose: 25-50 mL Freq: EVERY 15 MIN PRN Route: IV  PRN Reason: low blood sugar  Start: 10/02/17 0458   Admin Instructions: Use if have IV access, BG less than 70 mg/dL and meet dose criteria below:  Dose if conscious and alert (or disorientated) and NPO = 25 mL  Dose if unconscious / not alert = 50 mL  Vesicant.               Or  glucagon injection 1 mg  Dose: 1 mg Freq: EVERY 15 MIN PRN Route: SC  PRN Reason: low blood sugar  PRN Comment: May repeat x 1 only  Start: 10/02/17 0458   Admin Instructions: May give SQ or IM. ONLY use glucagon IF patient has NO IV access AND is UNABLE to swallow AND blood glucose is LESS than or EQUAL to 50 mg/dL.               hydrALAZINE (APRESOLINE) injection 10 mg  Dose: 10 mg Freq: EVERY 6 HOURS PRN Route: IV  PRN Reason: high blood pressure  PRN Comment: SBP >160  Start: 10/06/17 0216              HYDROmorphone (PF) (DILAUDID) injection 0.2 mg  Dose: 0.2 mg Freq: EVERY 3 HOURS PRN Route: IV  PRN Reason: moderate to severe pain  Start: 10/03/17 0238              insulin aspart (NovoLOG) inj (RAPID ACTING)  Dose: 1-5 Units Freq: AT BEDTIME Route: SC  Start: 10/12/17 2200   Admin Instructions: MEDIUM INSULIN RESISTANCE DOSING    Do Not give Bedtime Correction Insulin if BG less than  200.   For  - 249 give 1 units.   For  - 299 give 2 units.   For  - 349 give 3 units.   For  -399 give 4 units.   For BG greater than or equal to 400 give 5 units.  Notify provider if glucose greater than or equal to 350 mg/dL after administration of correction dose.  If given at mealtime, must be administered 5 min before meal or immediately after.     2203 (3 Units)-Given        2106 (2 Units)-Given [C]        2224 (1 Units)-Given        2129 (2 Units)-Given        (2128)-Not Given        (2136)-Not Given        [ ] 2200           insulin aspart (NovoLOG) inj (RAPID ACTING)  Dose: 1-7 Units Freq: 3 TIMES DAILY BEFORE MEALS Route: SC  Start: 10/12/17 0815   Admin Instructions: Correction Scale - MEDIUM INSULIN RESISTANCE DOSING     Do Not give Correction Insulin if Pre-Meal BG less than 140.   For Pre-Meal  - 189 give 1 unit.   For Pre-Meal  - 239 give 2 units.   For Pre-Meal  - 289 give 3 units.   For Pre-Meal  - 339 give 4 units.   For Pre-Meal - 399 give 5 units.    For Pre-Meal -449 give 6 units  For Pre-Meal BG greater than or equal to 450 give 7 units.   To be given with prandial insulin, and based on pre-meal blood glucose.    Notify provider if glucose greater than or equal to 350 mg/dL after administration of correction dose.  If given at mealtime, must be administered 5 min before meal or immediately after.     (0823)-Not Given       1200 (2 Units)-Given       1824 (2 Units)-Given        0744 (1 Units)-Given       1317 (1 Units)-Given       (1757)-Not Given [C]        0809 (1 Units)-Given       1120 (5 Units)-Given       1839 (1 Units)-Given        (0758)-Not Given       1214 (1 Units)-Given               (0835)-Not Given       (1201)-Not Given       (1807)-Not Given        (0756)-Not Given       (1320)-Not Given       1756 (1 Units)-Given        [ ]  0831 Incomplete       (1225)-Not Given       [ ] 1700           insulin glargine (LANTUS) injection 8 Units  Dose: 8 Units Freq: EVERY MORNING BEFORE BREAKFAST Route: SC  Start: 10/15/17 0730       0816 (8 Units)-Given        1201 (8 Units)-Given        (0756)-Not Given [C]        0829 (8 Units)-Given           ipratropium - albuterol 0.5 mg/2.5 mg/3 mL (DUONEB) neb solution 3 mL  Dose: 1 vial Freq: 3 TIMES DAILY Route: NEBULIZATION  Start: 10/05/17 1000    0822 (3 mL)-Given       1400 (3 mL)-Given       2050 (3 mL)-Given        (0808)-Not Given       (1400)-Not Given [C]       2014 (3 mL)-Given        0839 (3 mL)-Given       1412 (3 mL)-Given       1926 (3 mL)-Given        0825 (3 mL)-Given       1240 (3 mL)-Given       2049 (3 mL)-Given        0754 (3 mL)-Given       1301 (3 mL)-Given       1901 (3 mL)-Given        0842 (3 mL)-Given       1312 (3 mL)-Given       1943 (3 mL)-Given        0807 (3 mL)-Given       (1319)-Not Given       [ ] 1900           lidocaine (LMX4) cream  Freq: ONCE PRN Route: Top  PRN Reason: moderate pain  PRN Comment: for local anesthetic during PICC insertion  Start: 10/05/17 0952   Admin  Instructions: Apply to PICC Insertion Site. Apply 30 minutes prior to procedure. MAX Dose: 2.5 gm  (1/2 of 5 gm tube)                 lidocaine (XYLOCAINE) 2 % topical gel  Freq: EVERY 4 HOURS PRN Route: Top  PRN Reason: moderate pain  Start: 10/02/17 1433   Admin Instructions: Apply to nares for NT insertion/suction.               magnesium sulfate 4 g in 100 mL sterile water (premade)  Dose: 4 g Freq: EVERY 4 HOURS PRN Route: IV  PRN Reason: magnesium supplementation  Start: 10/09/17 0604   Admin Instructions: For serum Mg++ less than 1.6 mg/dL  Give 4 g and recheck magnesium level 2 hours after dose, and next AM.               metoprolol (LOPRESSOR) injection 5 mg  Dose: 5 mg Freq: EVERY 6 HOURS PRN Route: IV  PRN Reason: other  PRN Comment: tachycardia  Start: 10/07/17 2024   Admin Instructions: May repeat 5mg dose 10 minutes after first dose one only               metoprolol (LOPRESSOR) tablet 25 mg  Dose: 25 mg Freq: 2 TIMES DAILY Route: PO  Start: 10/14/17 0745   Admin Instructions: Hold for SBP < 70 or symptomatic per Dr. Peter       (0809)-Not Given       1216 (25 mg)-Given       2114 (25 mg)-Given        0908 (25 mg)-Given       2129 (25 mg)-Given        1211 (25 mg)-Given       2007 (25 mg)-Given        0924 (25 mg)-Given       2125 (25 mg)-Given        1223 (25 mg)-Given       [ ] 2100           miconazole (MICATIN; MICRO GUARD) 2 % powder  Freq: EVERY 1 HOUR PRN Route: Top  PRN Reason: other  PRN Comment: topical candidiasis  Start: 10/12/17 2221   Admin Instructions: Apply to affected area.        0221 ( )-Given [C]       1454 ( )-Given                naloxone (NARCAN) injection 0.1-0.4 mg  Dose: 0.1-0.4 mg Freq: EVERY 2 MIN PRN Route: IV  PRN Reason: opioid reversal  Start: 10/02/17 0307   Admin Instructions: For respiratory rate LESS than or EQUAL to 8.  Partial reversal dose:  0.1 mg titrated q 2 minutes for Analgesia Side Effects Monitoring Sedation Level of 3 (frequently drowsy, arousable, drifts  to sleep during conversation).Full reversal dose:  0.4 mg bolus for Analgesia Side Effects Monitoring Sedation Level of 4 (somnolent, minimal or no response to stimulation).               No heparin products  Freq: CONTINUOUS PRN Route: XX  Start: 10/18/17 0709   Admin Instructions: No heparin during dialysis.               pantoprazole (PROTONIX) EC tablet 40 mg  Dose: 40 mg Freq: EVERY MORNING Route: PO  Start: 10/09/17 1115   Admin Instructions: DO NOT CRUSH.     0823 (40 mg)-Given        1318 (40 mg)-Given        0808 (40 mg)-Given        0802 (40 mg)-Given        0847 (40 mg)-Given        0924 (40 mg)-Given        0829 (40 mg)-Given           pentoxifylline (TRENtal) CR tablet 400 mg  Dose: 400 mg Freq: DAILY Route: PO  Start: 10/05/17 1100   Admin Instructions: DO NOT CRUSH.     0822 (400 mg)-Given        1317 (400 mg)-Given        0808 (400 mg)-Given        0801 (400 mg)-Given        0847 (400 mg)-Given        0924 (400 mg)-Given        1223 (400 mg)-Given           potassium chloride (KLOR-CON) Packet 20-40 mEq  Dose: 20-40 mEq Freq: EVERY 2 HOURS PRN Route: ORAL OR FEED  PRN Reason: potassium supplementation  Start: 10/09/17 0604   Admin Instructions: Use if unable to tolerate tablets.  If Serum K+ 3.0-3.3, dose = 60 mEq po total dose (40 mEq x1 followed in 2 hours by 20 mEq x1). Recheck K+ level 4 hours after dose and the next AM.  If Serum K+ 2.5-2.9, dose = 80 mEq po total dose (40 mEq Q2H x2). Recheck K+ level 4 hours after dose and the next AM.  If Serum K+ less than 2.5, See IV order.  Dissolve packet contents in 4-8 ounces of cold water or juice.               potassium chloride SA (K-DUR/KLOR-CON M) CR tablet 20-40 mEq  Dose: 20-40 mEq Freq: EVERY 2 HOURS PRN Route: PO  PRN Reason: potassium supplementation  Start: 10/09/17 0604   Admin Instructions: Use if able to take PO.   If Serum K+ 3.0-3.3, dose = 60 mEq po total dose (40 mEq x1 followed in 2 hours by 20 mEq x1). Recheck K+ level 4 hours after  dose and the next AM.  If Serum K+ 2.5-2.9, dose = 80 mEq po total dose (40 mEq Q2H x2). Recheck K+ level 4 hours after dose and the next AM.  If Serum K+ less than 2.5, See IV order.  DO NOT CRUSH               potassium phosphate 15 mmol in D5W 250 mL intermittent infusion  Dose: 15 mmol Freq: DAILY PRN Route: IV  PRN Reason: phosphorous supplementation  Start: 10/09/17 0605   Admin Instructions: For serum phosphorus level 2-2.4  Do not infuse Phosphorus in the same line as TPN.   Give 15 mmol and recheck phosphorus level next AM.               potassium phosphate 20 mmol in D5W 250 mL intermittent infusion  Dose: 20 mmol Freq: EVERY 6 HOURS PRN Route: IV  PRN Reason: phosphorous supplementation  Start: 10/09/17 0605   Admin Instructions: For serum phosphorus level 1.1-1.9  For CENTRAL Line ONLY  Do not infuse Phosphorus in the same line as TPN.   Give 20 mmol and recheck phosphorus level 2 hours after last dose and next AM.               potassium phosphate 20 mmol in D5W 500 mL intermittent infusion  Dose: 20 mmol Freq: EVERY 6 HOURS PRN Route: IV  PRN Reason: phosphorous supplementation  Start: 10/09/17 0605   Admin Instructions: For serum phosphorus level 1.1-1.9  For Peripheral Line  Do not infuse Phosphorus in the same line as TPN.   Give 20 mmol and recheck phosphorus level 2 hours after last dose and next AM.               potassium phosphate 25 mmol in D5W 500 mL intermittent infusion  Dose: 25 mmol Freq: EVERY 8 HOURS PRN Route: IV  PRN Reason: phosphorous supplementation  Start: 10/09/17 0605   Admin Instructions: For serum phosphorus level less than 1.1  Do not infuse Phosphorus in the same line as TPN.   Give 25 mmol and recheck phosphorus level 2 hours after last dose and next AM.               pramipexole (MIRAPEX) tablet 0.125 mg  Dose: 0.125 mg Freq: 3 TIMES DAILY Route: PO  Start: 10/13/17 0900     0221 (0.125 mg)-Given       (1318)-Not Given       1742 (0.125 mg)-Given       2106 (0.125  mg)-Given        0808 (0.125 mg)-Given       1737 (0.125 mg)-Given       2114 (0.125 mg)-Given        0801 (0.125 mg)-Given       1702 (0.125 mg)-Given       2129 (0.125 mg)-Given        0847 (0.125 mg)-Given       1642 (0.125 mg)-Given       2128 (0.125 mg)-Given        0924 (0.125 mg)-Given       1613 (0.125 mg)-Given       2125 (0.125 mg)-Given        1223 (0.125 mg)-Given       [ ] 1600       [ ] 2200           sodium chloride (PF) 0.9% PF flush 10 mL  Dose: 10 mL Freq: EVERY 8 HOURS Route: IK  Start: 10/05/17 1045   Admin Instructions: And Q1H PRN, to lock CVC - Open Ended (Tunneled and Non-Tunneled) dormant lumen.     0505 (10 mL)-Given       1156 (10 mL)-Given       2032 (10 mL)-Given        0301 (10 mL)-Given       1319 (10 mL)-Given       2107 (10 mL)-Given        0533 (10 mL)-Given       1122 (10 mL)-Given       2115 (10 mL)-Given        (0424)-Not Given [C]       (1321)-Not Given       2129 (10 mL)-Given        0548 (10 mL)-Given       0549 (10 mL)-Given       (1445)-Not Given [C]       2129 (10 mL)-Given        0619 (10 mL)-Given       0620 (10 mL)-Given       1613 (10 mL)-Given       2126 (10 mL)-Given        0600 (30 mL)-Given       (1555)-Not Given       [ ] 2200           sodium chloride (PF) 0.9% PF flush 10-20 mL  Dose: 10-20 mL Freq: EVERY 1 HOUR PRN Route: IK  PRN Reasons: line flush,post meds or blood draw  Start: 10/05/17 1035   Admin Instructions: to flush CVC - Open Ended (Tunneled and Non-Tunneled).  10 mL post IV meds; 20 mL post blood draw.       0537 (10 mL)-Given        0656 (20 mL)-Given        0549 (20 mL)-Given        0619 (20 mL)-Given            warfarin (COUMADIN) tablet 3 mg  Dose: 3 mg Freq: ONCE AT 6PM Route: PO  Start: 10/18/17 1800          [ ] 1800           Warfarin Therapy Reminder (Check START DATE - warfarin may be starting in the FUTURE)  Freq: CONTINUOUS PRN Route: XX  Start: 10/09/17 1704   Admin Instructions: *Note to reorder warfarin daily*  Pharmacy Warfarin Dosing  Service  Patient is on Warfarin Therapy - check for daily order              Completed Medications  Medications 10/12/17 10/13/17 10/14/17 10/15/17 10/16/17 10/17/17 10/18/17         Dose: 250 mL Freq: ONCE Route: HM Machine  Start: 10/18/17 0715   End: 10/18/17 0941   Admin Instructions: For Dialyzer Prime. (In Dialyzer)           0941 (250 mL)-New Bag [C]             Dose: 250 mL Freq: ONCE Route: HM Machine  Start: 10/16/17 0715   End: 10/16/17 0923   Admin Instructions: For Dialyzer Prime. (In Dialyzer)         0923 (250 mL)-New Bag [C]               Dose: 400 mg Freq: DAILY Route: PO  Start: 10/16/17 1545   End: 10/16/17 1642   Admin Instructions: Avoid grapefruit juice during oral amiodarone treatment.         1642 (400 mg)-Given               Dose: 4,000 Units Freq: ONCE Route: IV  Start: 10/18/17 1030   End: 10/18/17 1057   Admin Instructions: Given during each dialysis (per request)           1057 (4,000 Units)-Given [C]             Dose: 500 Units Freq: ONCE IN DIALYSIS Route: HM Machine  Start: 10/16/17 0715   End: 10/16/17 0924   Admin Instructions: LOADING DOSE  Administer loading dose prior to heparin infusion.   PRE DIALYSIS RUN   Dialysis         0924 (500 Units)-Given [C]               Dose: 3 mL Freq: ONCE PRN Route: IV  PRN Comment: To BLUE lumen for dialysis catheter care POST RUN  Start: 10/16/17 0702   End: 10/16/17 1123   Admin Instructions: Administer volume specific to catheter lumen/catheter length (dose above is the size of vial or syringe dispensed, not volume to administer). Administer POST DIALYSIS RUN.         1123 (1,600 Units)-Given [C]               Dose: 3 mL Freq: ONCE PRN Route: IV  PRN Comment: To RED lumen for dialysis catheter care POST RUN  Start: 10/16/17 0702   End: 10/16/17 1121   Admin Instructions: Administer volume specific to catheter lumen/catheter length (dose above is the size of vial or syringe dispensed, not volume to administer). Administer POST DIALYSIS RUN.          1121 (1,600 Units)-Given [C]               Dose: 3 mg Freq: ONCE AT 6PM Route: PO  Start: 10/16/17 1800   End: 10/16/17 1735        1735 (3 mg)-Given               Dose: 4 mg Freq: ONCE AT 6PM Route: PO  Start: 10/17/17 1800   End: 10/17/17 1747         1747 (4 mg)-Given              Dose: 4 mg Freq: ONCE AT 6PM Route: PO  Start: 10/15/17 1800   End: 10/15/17 1702       1702 (4 mg)-Given             Discontinued Medications  Medications 10/12/17 10/13/17 10/14/17 10/15/17 10/16/17 10/17/17 10/18/17         Dose: 100 mL Freq: EVERY 5 MIN PRN Route: IV  PRN Comment: see admin instructions  Start: 10/16/17 0702   End: 10/16/17 1457   Admin Instructions: FOR Systolic Blood Pressure less than 90 mmHg or for volume replacement during dialysis DURING DIALYSIS RUN         (1124)-Not Given       1457-Med Discontinued           Dose: 250-1,000 mL Freq: ONCE IN DIALYSIS Route: IV  Start: 10/16/17 0715   End: 10/16/17 1457   Admin Instructions: For patient prime during dialysis         (0922)-Not Given       1457-Med Discontinued           Dose: 50 mL Freq: EVERY 1 HOUR PRN Route: IV  PRN Reason: other  PRN Comment: see admin instructions  Start: 10/16/17 0702   End: 10/16/17 1457   Admin Instructions: FOR Systolic Blood Pressure less than 90 mmHg or for volume replacement during dialysis DURING DIALYSIS RUN         (1124)-Not Given       1457-Med Discontinued           Dose: 200 mg Freq: DAILY Route: PO  Start: 10/16/17 0900   End: 10/16/17 1017   Admin Instructions: Hold for SBP < 70 or symptomatic per Dr. Peter  Avoid grapefruit juice during oral amiodarone treatment.         1017-Med Discontinued                 Dose: 400 mg Freq: 2 TIMES DAILY Route: PO  Start: 10/16/17 2100   End: 10/16/17 1541   Admin Instructions: Hold for SBP < 70 or symptomatic per Dr. Peter  Avoid grapefruit juice during oral amiodarone treatment.         1541-Med Discontinued           Rate: 0.5 mL/hr Freq: CONTINUOUS Route: [employer] Machine  Last Dose:  500 Units/hr (10/16/17 0925)  Start: 10/16/17 0715   End: 10/16/17 1457   Admin Instructions: DURING DIALYSIS TREATMENT         0925 (500 Units/hr)-New Bag [C]       1457-Med Discontinued           Dose: 4 mg Freq: ONCE AT 6PM Route: PO  Start: 10/16/17 1800   End: 10/16/17 1149        1149-Med Discontinued      Medications 10/12/17 10/13/17 10/14/17 10/15/17 10/16/17 10/17/17 10/18/17

## 2017-10-02 NOTE — PROGRESS NOTES
ICU staff  10/2/2017, 4:10 PM     Re-assessed patient. She is requiring a bit more O2 by simple mask, and will switch to high-flow to see if she tolerates this better. Would like to avoid CPAP/BiPAP given her secretions, if possible. No dyspnea, not using accessory muscles, denies feeling tired or as though her breathing is worsening. She has told me that she would be OK with intubation if needed.    CXR reviewed, no significant changes to my interpretation. Still has dense consolidation of the LLL.    BP has remained low, on NE 0.18 and now vasopressin 2.4. If these escalate much, will likely need arterial line; demands have been pretty stable through the day. Cortisol pending.    Volume status unclear. Albumin 5% bolus given this AM without much effect; repeating now. Also checked bedside ultrasound, and to my read her IVC is collapsible, which is suspicious for volume depletion. Clinically this would make sense, given her issues with diarrhea/high-volume stool and recent intensification of diuretic regimen. Will give additional fluid bolus and follow for effect.    Dwaine Tabor MD, PhD  Surgical critical care  October 2, 2017, 4:14 PM    Addendum/  Cortisol 18; will add hydrocortisone.  -RB

## 2017-10-02 NOTE — PROGRESS NOTES
Clinic Care Coordination Contact    Situation: Patient chart reviewed by care coordinator.    Background: Pt was listed on the ED list    Assessment: Pt is currently inpt at [facility].     Plan/Recommendations: will continue to follow for discharge planning/disposition.    Meenakshi Larsen RN, BSN  Care Coordination    [address] [address]  Office: [phone]  Fax [phone]   [email or web address]   [email or web address]     Connect with [facility] on social media.

## 2017-10-02 NOTE — PROVIDER NOTIFICATION
Received call from [facility] ([facility]? ) of pt's critical lab -pH 7.09.  Reported result to Dr. Tabor

## 2017-10-02 NOTE — H&P
[facility]    History and Physical  [facility] Intensive Care     Date of Admission:  10/2/2017  Date of Service (when I saw the patient): 10/02/17    Assessment & Plan   Kathryn Banks is a 77 year old female who presents with hypotension, cough after recent admission.     Neurology:  Alert and appropriate:   - she is slightly confused about details of recent medical illness but is redirectable and appropriate    Cardiovascular:  Septic shock: multiple potential sources of infection  HFpEF: recently treated for acute on chronic diastolic heart failure   - vasopressin at 2.4u/hr ; norepinephrine titrate to goal MAP > 65  - trial diuresis given diastolic heartfailure    Pulmonary:        Acute respiratory failure: pulmonary edema + possible pneumonia  - supplemental oxygen  - nebulized bronchodilator and sputum collection is possible    Renal  End stage renal disease, hyperkalemia and metabolic acidosis: not on hemodialysis  - attempt diuresis  - may need HD this admission, no access currently  - insulin and loop diuretic for hyperkalemia  - bicarbonate for hyperkalemia and metabolic acidosis    ID:         c diff colitis: diagnosed at [facility] on 9/14  UTI: Previously Escherichia coli, Enterococcus faecalis, Citrobacter  Skin cultures: polymicrobial cultures from Aug, based on clinical exam not thought to be actively causing infection  Possible pneumonia?  - pending urine, blood and sputum cultures  -Empiric therapy with Zosyn and vancomycin    GI  C. difficile colitis: Continue oral vancomycin  -    Endocrine:  Hyperglycemia with history of diabetes:   -Initiate insulin infusion    Heme/Onc:  Chronic anticoagulation for atrial fibrillation: INR 5.71  - hold warfarin  - vitamin k in anticipation of possible procedure (dialysis catheter placement)    DVT Prophylaxis: Warfarin  GI Prophylaxis: Not indicated    Restraints: Restraints for medical healing needed: NO    Family update by me today: Yes      Beth Laird MD    Time Spent on this Encounter   Billing:  I spent 45 minutes bedside and on the inpatient unit today managing the critical care of Kathryn Banks in relation to the issues listed in this note.    Code Status   Full Code    Primary Care Physician   Dheeraj Jj    Chief Complaint   Cough, shoulder pain    History is obtained from the patient and emergency department physician    History of Present Illness   Kathryn Banks is a 77 year old female who presents with shoulder pain to [facility] ED. She came in after a fall today with shoulder pain and left the ED but returned with worsening cough and weakness. She was recently hospitalized at [facility] and treated for multiple infections. She also has nearly end stage kidney failure and HD planning is underway. She has been and out of hospitals and transitional care units this year.  She had a hip fracture, volume overload.  Several family members were at bedside this morning and I briefly discussed with them her overall health picture and trajectory.  She reports that she had not previously consider this, currently she wants to live but is requesting a .    Past Medical History    I have reviewed this patient's medical history and updated it with pertinent information if needed.   Past Medical History:   Diagnosis Date     Carpal tunnel syndrome      Coronary atherosclerosis of native coronary artery 2006    5 vessel CABG     OBSTRUCTIVE SLEEP APNEA     using CPAP     Osteoarthrosis, unspecified whether generalized or localized, unspecified site     generalized arthritis, particularly in her hands and feet         Other and combined forms of senile cataract 2000    Bilateral     Other and unspecified hyperlipidemia      RESTLESS LEGS SYNDROME      TENOSYNOV HAND/WRIST NEC 6/30/2006     Type II or unspecified type diabetes mellitus without mention of complication, not stated as uncontrolled 2001    Diabetes mellitus/pt is diet  controlled with weight loss     Typical atrial flutter (H) 01/17/2007    ablation 6/7/2017     Unspecified essential hypertension        Past Surgical History   I have reviewed this patient's surgical history and updated it with pertinent information if needed.  Past Surgical History:   Procedure Laterality Date     ANGIOPLASTY       C APPENDECTOMY       C CABG, VEIN, FIVE  12/06    SVG x 4 and LIMA to LAD     C SOTO W/O FACETEC FORAMOT/DSKC 1/2 VRT SEG, LUMBAR  1968     C REMV CATARACT INTRACAP,INSERT LENS      Bilateral     C TOTAL ABDOM HYSTERECTOMY  1995     - fibroids     C VAGOTOMY/PYLOROPLASTY,SELECT  1970     COLONOSCOPY  12/3/2007     COLONOSCOPY  1/17/2014    Procedure: COLONOSCOPY;  Colonoscopy;  Surgeon: Zack Stearns MD;  Location:  GI     CYSTOSCOPY  11/25/09    [facility]     ENDOSCOPY  03/21/2000    Upper GI     HC COLONOSCOPY THRU STOMA, DIAGNOSTIC  10/7/04    poor prep, repeat in 2-3 years     HC COLONOSCOPY W/WO BRUSH/WASH  10/31/07    Repeat-poor quality prep     HC REMOVAL OF OVARY/TUBE(S)      Salpingo-Oophorectomy, Unilateral     HC REVISE MEDIAN N/CARPAL TUNNEL SURG      Carpal tunnel release     HC UGI ENDOSCOPY DIAG W BIOPSY  10/31/07     HC UGI ENDOSCOPY, SIMPLE EXAM  12/3/2007     OPEN REDUCTION INTERNAL FIXATION HIP NAILING Left 7/3/2016    Procedure: OPEN REDUCTION INTERNAL FIXATION HIP NAILING;  Surgeon: Lake Schulz MD;  Location:  OR       Prior to Admission Medications   Prior to Admission Medications   Prescriptions Last Dose Informant Patient Reported? Taking?   ACETAMINOPHEN PO   Yes No   Sig: Take 650 mg by mouth every 4 hours as needed for pain For pain 1-5 out of 10   Calcium Carb-Cholecalciferol 500-400 MG-UNIT TABS   Yes No   Sig: Take 1 tablet by mouth 2 times daily   Lactobacillus (ACIDOPHILUS) tablet   No No   Sig: Take 1 tablet by mouth daily   ORDER FOR DME   No No   Sig: Equipment being ordered: cpap machine, mask, humidifier, tubing and filters   TRAMADOL  HCL PO   Yes No   Sig: Take 50 mg by mouth every 6 hours as needed for moderate to severe pain Pain of 6-10 out of 10   Warfarin Sodium (COUMADIN PO)   Yes No   Sig: Take by mouth daily Take as directed per INR results   amLODIPine (NORVASC) 5 MG tablet   No No   Sig: Take 2 tablets (10 mg) by mouth daily   aspirin 81 MG tablet   No No   Sig: Take 1 tablet (81 mg) by mouth daily   calcitRIOL (ROCALTROL) 0.5 MCG capsule   No No   Sig: Take 1 capsule (0.5 mcg) by mouth daily   calcium acetate (PHOSLO) 667 MG CAPS capsule   No No   Sig: Take 1 capsule (667 mg) by mouth 3 times daily (with meals)   cyanocobalamin (VITAMIN B12) 1000 MCG/ML injection   Yes No   Sig: Inject 1 mL into the muscle every 30 days   darbepoetin hira (ARANESP, ALBUMIN FREE,) 60 MCG/0.3ML injection   No No   Sig: Inject 0.3 mLs (60 mcg) Subcutaneous every 14 days As needed for hgb<10g/dL.  If Hgb increases >1 point in 2 weeks (if blood transfusion given, use hgb PRIOR to this), SYSTOLIC BP > 180 mmHg or hgb>=10g/dL, HOLD DOSE. Dose must be within 1 week of Hgb.  Per anemia protocol with Vibha Barfield MD/Yesy Samaniego,PharmD [phone]   ferrous sulfate (IRON) 325 (65 FE) MG tablet   No No   Sig: Take 1 tablet (325 mg) by mouth daily (with breakfast)   fluticasone (FLONASE) 50 MCG/ACT spray   No No   Sig: Spray 1 spray into both nostrils daily   gabapentin (NEURONTIN) 300 MG capsule   No No   Sig: Take 1 capsule (300 mg) by mouth At Bedtime   guaiFENesin-dextromethorphan (ROBITUSSIN DM) 100-10 MG/5ML syrup   No No   Sig: Take 5 mLs by mouth every 8 hours as needed for cough or congestion   Patient not taking: Reported on 9/21/2017   ipratropium - albuterol 0.5 mg/2.5 mg/3 mL (DUONEB) 0.5-2.5 (3) MG/3ML neb solution   Yes No   Sig: Take 1 vial by nebulization 3 times daily   levofloxacin (LEVAQUIN) 500 MG tablet   No No   Sig: Take 1 tablet (500 mg) by mouth every other day for 5 doses   metoprolol (LOPRESSOR) 50 MG tablet   No No   Sig: Take 0.5  tablets (25 mg) by mouth 2 times daily   nitroGLYcerin (NITROSTAT) 0.4 MG sublingual tablet   No No   Sig: Place 1 tablet (0.4 mg) under the tongue every 5 minutes as needed for chest pain   pentoxifylline (TRENTAL) 400 MG CR tablet   No No   Sig: Take 1 tablet (400 mg) by mouth daily   pramipexole (MIRAPEX) 0.125 MG tablet   No No   Sig: Take 1 tablet (0.125 mg) by mouth 3 times daily   pravastatin (PRAVACHOL) 40 MG tablet   No No   Sig: Take 1 tablet (40 mg) by mouth daily   sodium bicarbonate 650 MG tablet   No No   Sig: Take 2 tablets (1,300 mg) by mouth 3 times daily   sodium bicarbonate 650 MG tablet   No No   Sig: TAKE ONE TABLET BY MOUTH THREE TIMES DAILY   torsemide (DEMADEX) 20 MG tablet   No No   Sig: Take 2 tablets (40 mg) by mouth 2 times daily   vancomycin (VANOCIN) 50 mg/mL LIQD solution   No No   Sig: Take 2.5 mLs (125 mg) by mouth 4 times daily for 14 days      Facility-Administered Medications: None     Allergies   Allergies   Allergen Reactions     Ciprofloxacin Nausea and Vomiting     Zofran did not help     Oxycodone Visual Disturbance     Delusions, blackouts      Lisinopril Cough     Sulfa Drugs GI Disturbance     LOSS OF TASTE       Social History   I have reviewed this patient's social history and updated it with pertinent information if needed. Kathryn WEEKS [place]  reports that she quit smoking about 48 years ago. She quit after 10.00 years of use. She has never used smokeless tobacco. She reports that she drinks alcohol. She reports that she does not use illicit drugs.    Family History   I have reviewed this patient's family history and updated it with pertinent information if needed.   Family History   Problem Relation Age of Onset     DIABETES Father      AODM     Neurologic Disorder Father      Parkinson's     Blood Disease Father       from blood clot from leg to lung immediately after hip fracture     DIABETES Paternal Grandmother      adult onset     DIABETES Maternal Grandmother       adult onset     Hypertension No family hx of      CEREBROVASCULAR DISEASE No family hx of        Review of Systems   CONSTITUTIONAL:  positive for  malaise  negative for  fevers, chills and sweats  RESPIRATORY:  positive for  dry cough and dyspnea  CARDIOVASCULAR:  negative for  chest pain, palpitations  INTEGUMENT/BREAST:  positive for skin lesion(s)  BEHAVIOR/PSYCH:  negative for increased agitation and anxiety    Physical Exam       Temp  Min: 90.1  F (32.3  C)  Max: 95.6  F (35.3  C)                Vital Signs with Ranges  Temp:  [90.1  F (32.3  C)-95.6  F (35.3  C)] 90.1  F (32.3  C)  Pulse:  [50] 50  Heart Rate:  [46-61] 47  Resp:  [12-36] 18  BP: ()/(43-72) 73/49  SpO2:  [85 %-97 %] 93 %  0 lbs 0 oz    Awake, alert and following commands  PERRL and conjugate gaze  Oxymizer mask  Lungs rhonchi bilaterally  H-RR w/o murmur  Abdomen-soft, non tender, non distended  Extremities-warm and 2+ edema, venous stasis ulcers examined on both legs diffuse skin erythema no purulent drainage  Central line insertion site without inflammation  Data   Results for orders placed or performed during the hospital encounter of 10/02/17 (from the past 24 hour(s))   Glucose by meter   Result Value Ref Range    Glucose 223 (H) 70 - 99 mg/dL     *Note: Due to a large number of results and/or encounters for the requested time period, some results have not been displayed. A complete set of results can be found in Results Review.

## 2017-10-02 NOTE — ED NOTES
LATE ENTRY DUE TO PT CARE:      Assumed cares of pt at 2315, report received from Jennifer PONCE.  Pt was moved to room 6.  Pt placed on cardiac monitor, blood pressure every 10 minutes and oxymetry.  Antibiotics started via peripheral lines.  Provider in to speak with pt about central line placement.  Informed consent signed by pt.  Pt prepped and equipment prepared.  Dr Zepeda performed procedure assisted by bedside ultrasound.  Placed of CVC in the IJ verified by x-ray.  Initial xray placement was in the cavo atrial junction therefore was pulled back by the provider.  Second xray confirmed placement in the SVC.  Fluid bolus started through the CVC.  Report given to Kenzie PONCE at [facility] ([phone]).  Oxygen saturations consistently 87-89% on 4 LPM via nasal cannula.  Pt changed over to an oxymask and O2 increased to 5 LPM.  O2 sats after the change were 90-92%.   Repeat temperature obtained, 91.0 rectal, verified temperature with different thermometer.  Informed provider.  Pt placed on bearhugger.  EMTALA form signed by pt.  After bolus was almost completed updated provider on pt's continued low blood pressures (70's/40's).  Order received to start Levophed and an additional bolus of fluid.  At that time transport mode was changed to air care from ambulance.  Levophed drip started. Pt's friend present throughout and has updated pt's family.   Air care arrived.  Pt changed over to their equipment. Pt has had minimal output since catheter was placed, 20 cc emptied at time of transfer.  Cares transferred to Air Care RN. Called to update Kenzie at [facility] of pt's change in status.

## 2017-10-02 NOTE — PLAN OF CARE
Problem: Respiratory Distress Syndrome (,NICU)  Goal: Signs and Symptoms of Listed Potential Problems Will be Absent, Minimized or Managed (Respiratory Distress Syndrome)  Signs and symptoms of listed potential problems will be absent, minimized or managed by discharge/transition of care (reference Respiratory Distress Syndrome (,NICU) CPG).  Outcome: Therapy, unable to show any progress toward functional goals  Still having hallcinations, some tremors at times.  PH unchanged but bolused with one amp bicarb and 250 albumen.  Min. U/o.  Dtr at bedside til 2 pm.  More O2 needed, non productive cough NT suctioned x2 with min. Pink secretions sputum sent to the lab.   Vasopresson started due to low MAP.   Insulin gtt off due to bg 62 at 1030.  One amp of d50 given bg now at 82.

## 2017-10-02 NOTE — IP AVS SNAPSHOT
` ` Patient Information     Patient Name Sex     Kathryn Banks ([id number]) Female 1940       Room Bed    [address] [place] [address]      Patient Demographics     Address Phone E-mail Address    [address] [place] [address] [phone] (Home) *Preferred*  none (Work)  none (Mobile) [email or web address]      Patient Ethnicity & Race     Ethnic Group Patient Race    American White      Emergency Contact(s)     Name Relation Home Work Mobile    Urbano Banks Spouse [phone] none none    yoshi roth Daughter [phone] none [phone]    Mimi Banks Grandchild   [phone]      Documents on File        Status Date Received Description       Documents for the Patient    Privacy Notice - [place] Received 08     Face Sheet  () 08     Insurance Card  () 04     Face Sheet  () 07     Insurance Card  () 03/15/06     Insurance Card  () 06     Insurance Card  () 07     Waiver - Payment  07            Insurance Card  () 07     Insurance Card Received () 09     External Medication Information Consent Accepted () 09     Insurance Card Received () 04/14/10 MEDICARE    Face Sheet Received () 04/14/10     Patient ID Received () 14     Consent for Services - Hospital/Clinic Received () 11/03/10     Insurance Card Received () 10/31/10     Insurance Card  ()      Consent for Services - Hospital/Clinic Received () 11 CONSENT FOR SERVICES    External Medication Information Consent Accepted () 11     CMS IM for Patient Signature       Physical Therapy Certification Received () 12     External Medication Information Consent Accepted () 12     Insurance Card Received () 12     Consent for Services - Hospital/Clinic Received () 12     Consent to Communicate   PHI -  12    HIM EARLENE Authorization  12 NDBC 12    Physical Therapy Certification Received 13     Advance Directives and Living Will Received 13 VALIDATION OF AD 7/3/08    Advance Directives and Living Will Received 13 HEALTHCARE DIRECTIVE 7/3/08    Consent for EHR Access  13 Copied from existing Consent for services - C/HOD collected on 2012    [facility] Specified Other       Insurance Card Received 13 medicare    External Medication Information Consent Accepted 13     Consent for EHR Access Received 13     Consent for Services - Hospital/Clinic Received () 13     HIM EARLENE Authorization  14 Patient    HIM EARLENE Authorization  14 PATIENT    Advance Directives and Living Will Received 14 Health Care Directive  14    Advance Directives and Living Will Received 14 Validation of AD 14    Insurance Card Received () 14 hp freedom plan    Physical Therapy Re-Certification Received 14 to 11/3/14    Consent for Services - Hospital/Clinic Received () 14     Insurance Card Received () 03/23/15 HP/rx    HIM EARLENE Authorization  04/03/15 Kathryn Banks    Consent for Services/Privacy Notice - Hospital/Clinic Received () 16     Insurance Card Received () 16     Business/Insurance/Care Coordination/Health Form - Patient  16 ADULT REHABILITATION ATTENDANCE POLICY    Business/Insurance/Care Coordination/Health Form - Patient  16 CONSUMER PRICELINE    HIM EARLENE Authorization  10/14/16 Kathryn Banks    Business/Insurance/Care Coordination/Health Form - Patient  17 ADULT REHABILITATION ATTENDANCE POLICY    Insurance Card Received 17 BCBS    HIM EARLENE Authorization  17     Consent for Services/Privacy Notice - Hospital/Clinic Received 17     Consent for EHR Access Received 17     Consent for Services/Privacy Notice - Hospital/Clinic  Received 06/13/17     Consent for Services - UMP       Patient ID Scan Refused 07/17/17     Consent for Services - Geriatrics Received 09/05/17     Face Sheet Received (Deleted) 08/05/09     Advance Directives and Living Will Received (Deleted) 07/03/08     Advance Directives and Living Will Received (Deleted) 02/01/13 VALIDATION OF ADVANCE DIRECTIVE 7-3-2008    Insurance Card Received (Deleted) 09/11/14        Documents for the Encounter    CMS IM for Patient Signature Received 10/04/17 [facility]    EMS/Ambulance Record  10/06/17 [facility] AMBULANCE SERVICES    Consent for Services - Informed  10/17/17 INFORMED CONSENT FOR SURGERY OR DESIGNATED PROCEDURE (SHORT VERSION)      Admission Information     Attending Provider Admitting Provider Admission Type Admission Date/Time    Rashard Millan MD Begnaud, Beth Erickson MD Urgent 10/02/17  0243    Discharge Date Hospital Service Auth/Cert Status Service Area     Hospitalist Incomplete [facility]    Unit Room/Bed Admission Status        CARDIAC SPEC CARE 0251/0251-01 Admission (Confirmed)       Admission     Complaint    hypotension, Septic shock (H)      Hospital Account     Name Acct ID Class Status Primary Coverage    Kathryn Banks [id number] Inpatient Open MEDICARE - MEDICARE FOR HB SUPPLEMENT            Guarantor Account (for Hospital Account #[id number])     Name Relation to Pt Service Area Active? Acct Type    Kathryn Banks  FCS Yes Personal/Family    Address Phone          [address] [place] [address] [phone](H)              Coverage Information (for Hospital Account #[id number])     1. MEDICARE/MEDICARE FOR HB SUPPLEMENT     F/O Payor/Plan Precert #    MEDICARE/MEDICARE FOR HB SUPPLEMENT     Subscriber Subscriber #    Kathryn Banks [id number]    Address Phone    ATTN CLAIMS  [address]  [facility] [place] [address] [phone]          2. BCBS/BCBS PLATINUM BLUE     F/O Payor/Plan Precert #    BCBS/BCBS  PLATINUM BLUE     Subscriber Subscriber #    DallassamanthaKathryn ochoa [id number]    Address Phone    [address] [address]  [place] [address] [phone]

## 2017-10-02 NOTE — IP AVS SNAPSHOT
MRN:[id number]                      After Visit Summary   10/2/2017    Kathryn Banks    MRN: [id number]           Thank you!     Thank you for choosing [facility] for your care. Our goal is always to provide you with excellent care. Hearing back from our patients is one way we can continue to improve our services. Please take a few minutes to complete the written survey that you may receive in the mail after you visit with us. Thank you!        Patient Information     Date Of Birth          1940        Designated Caregiver       Most Recent Value    Caregiver    Will someone help with your care after discharge? yes    Name of designated caregiver tete Le    Phone number of caregiver .    Caregiver address .      About your hospital stay     You were admitted on:  October 2, 2017 You last received care in the:  [facility]    You were discharged on:  October 18, 2017       Who to Call     For medical emergencies, please call 911.  For non-urgent questions about your medical care, please call your primary care provider or clinic, [phone]          Attending Provider     Provider Specialty    [facility]aird [facility]       Primary Care Provider Office Phone # Fax #    Dheeraj Jj [phone]      After Care Instructions     Activity - Up with nursing assistance           Advance Diet as Tolerated       Follow this diet upon discharge:      Combination Diet Dialysis Diet; 1700-6523 Calories: Moderate Consistent CHO (4-6 CHO units/meal)            Daily weights       Call Provider for weight gain of more than 2 pounds per day or 5 pounds per week.            General info for SNF       Length of Stay Estimate: Short Term Care: Estimated # of Days <30  Condition at Discharge: Improving  Level of care:skilled   Rehabilitation Potential: Good  Admission H&P remains valid and  up-to-date: Yes  Recent Chemotherapy: N/A  Use Nursing Home Standing Orders: Yes            Glucose monitor nursing POCT       Before meals and at bedtime            IV access       Hemodialysis.  RACW Dialysis catheter            Intake and output       Every shift            Mantoux instructions       Give two-step Mantoux (PPD) Per Facility Policy Yes                  Your next 10 appointments already scheduled     Oct 24, 2017  2:00 PM CDT   LAB with NL LAB [facility] ([facility])    [address] [facility] [address] [phone]           Patient must bring picture ID. Patient should be prepared to give a urine specimen  Please do not eat 10-12 hours before your appointment if you are coming in fasting for labs on lipids, cholesterol, or glucose (sugar). Pregnant women should follow their Care Team instructions. Water with medications is okay. Do not drink coffee or other fluids. If you have concerns about taking  your medications, please ask at office or if scheduling via LawBitehart, send a message by clicking on Secure Messaging, Message Your Care Team.            Oct 26, 2017  2:30 PM CDT   Core Return with Destiny Berrios NP   [facility] ([facility])    [address]  [facility] [id number] [phone]            Nov 02, 2017  2:00 PM CDT   (Arrive by 1:45 PM)   New Vascular Visit with Cassie Cardenas MD   [facility] ([facility])    [address]  3rd Floor  [facility] [id number] [phone]            Nov 20, 2017 12:20 PM CST   [facility])    [address] [place] [address] [phone]           Patient must bring picture ID. Patient should be prepared to give a urine specimen  Please do not eat 10-12 hours before your appointment if  you are coming in fasting for labs on lipids, cholesterol, or glucose (sugar). Pregnant women should follow their Care Team instructions. Water with medications is okay. Do not drink coffee or other fluids. If you have concerns about taking  your medications, please ask at office or if scheduling via eYeka, send a message by clicking on Secure Messaging, Message Your Care Team.            Nov 20, 2017  1:30 PM CST   Return Visit with Vibha Barfield MD   [facility] ([facility])    [address] [place] [address] [phone]              Additional Services     Occupational Therapy Adult Consult       Evaluate and treat as clinically indicated.    Reason:  Deconditioning            Physical Therapy Adult Consult       Evaluate and treat as clinically indicated.    Reason:  Deconditioning                  Future tests that were ordered for you     Contact Isolation       Enteric                  Further instructions from your care team           Check in at [facility] in [facility]. On Friday October 29th be there at 1030 am    Patient will discharge to [facility] today via family.  Angela Friedmans phone number is [phone].    Warfarin Instruction     You have started taking a medicine called warfarin. This is a blood-thinning medicine (anticoagulant). It helps prevent and treat blood clots.      Before leaving the hospital, make sure you know how much to take and how long to take it.      You will need regular blood tests to make sure your blood is clotting safely. It is very important to see your doctor for regular blood tests.    Talk to your doctor before taking any new medicine (this includes over-the-counter drugs and herbal products). Many medicines can interact with warfarin. This may cause more bleeding or too much clotting.     Eating a lot of vitamin K--found in green, leafy vegetables--can change the way warfarin works in  "your body. Do NOT avoid these foods. Instead, try to eat the same amount each day.     Bleeding is the most common side-effect of warfarin. You may notice bleeding gums, a bloody nose, bruises and bleeding longer when you cut yourself. See a doctor at once if:   o You cough up blood  o You find blood in your stool (poop)  o You have a deep cut, or a cut that bleeds longer than 10 minutes   o You have a bad cut, hard fall, accident or hit your head (go to urgent care or the emergency room).    For women who can get pregnant: This medicine can harm an unborn baby. Be very careful not to get pregnant while taking this medicine. If you think you might be pregnant, call your doctor right away.    For more information, read \"Guide to Warfarin Therapy,  the booklet you received in the hospital.        Pending Results     Date and Time Order Name Status Description    10/14/2017 1502 Blood culture Preliminary     10/14/2017 1432 Blood culture Preliminary     10/12/2017 1703 EKG 12-lead, tracing only Preliminary             Statement of Approval     Ordered          10/18/17 0931  I have reviewed and agree with all the recommendations and orders detailed in this document.  EFFECTIVE NOW     Approved and electronically signed by:  Hank Babcock MD             Admission Information     Date & Time Provider Department Dept. Phone    10/2/2017 Rashard Millan MD [facility] [phone]      Your Vitals Were     Blood Pressure Pulse Temperature Respirations Weight Pulse Oximetry    112/66 (BP Location: Left arm) 70 99.1  F (37.3  C) (Oral) 16 62.2 kg (137 lb 2 oz) 95%    BMI (Body Mass Index)                   23.54 kg/m2           MyChart Information     [facility] lets you send messages to your doctor, view your test results, renew your prescriptions, schedule appointments and more. To sign up, go to [email or web address] . Click on \"Log in\" on the left side of the screen, " "which will take you to the Welcome page. Then click on \"Sign up Now\" on the right side of the page.     You will be asked to enter the access code listed below, as well as some personal information. Please follow the directions to create your username and password.     Your access code is: [email or web address]  Expires: 2017  4:04 PM     Your access code will  in 90 days. If you need help or a new code, please call your [facility] clinic or [phone].        Care EveryWhere ID     This is your Care EveryWhere ID. This could be used by other organizations to access your [facility] medical records  [id number]        Equal Access to Services     CAROLINE BLAND : Tl Garcia, ryan pham, katharine downey, tay santizo . So [facility] [phone].    ATENCIÓN: Si habla español, tiene a medrano disposición servicios gratuitos de asistencia lingüística. Llame al [phone].    We comply with applicable federal civil rights laws and [place] laws. We do not discriminate on the basis of race, color, national origin, age, disability, sex, sexual orientation, or gender identity.               Review of your medicines      START taking        Dose / Directions    * amiodarone HCl 400 MG Tabs   Used for:  Atrial fibrillation with rapid ventricular response (H)        Dose:  400 mg   Take 400 mg by mouth 2 times daily for 6 days   Quantity:  12 tablet   Refills:  0       * amiodarone 200 MG tablet   Commonly known as:  PACERONE/CODARONE   Used for:  Atrial fibrillation with rapid ventricular response (H)        Dose:  200 mg   Start taking on:  10/24/2017   Take 1 tablet (200 mg) by mouth daily   Quantity:  30 tablet   Refills:  1       cholecalciferol 5000 UNITS Caps   Used for:  CKD (chronic kidney disease) stage 5, GFR less than 15 ml/min (H)        Dose:  5000 Units   Take 1 capsule (5,000 Units) by mouth daily   Refills:  3       clonazePAM 0.5 MG tablet   Commonly known " as:  klonoPIN   Used for:  Anxiety        Dose:  0.25 mg   Take 0.5 tablets (0.25 mg) by mouth 2 times daily as needed for anxiety   Quantity:  14 tablet   Refills:  0       doxercalciferol 4 MCG/2ML Soln injection   Commonly known as:  HECTOROL   Used for:  CKD (chronic kidney disease) stage 5, GFR less than 15 ml/min (H)        Dose:  2 mcg   Inject 1 mL (2 mcg) into the vein See Admin Instructions   Quantity:  25.5 mL   Refills:  1       * Notice:  This list has 2 medication(s) that are the same as other medications prescribed for you. Read the directions carefully, and ask your doctor or other care provider to review them with you.      CONTINUE these medicines which may have CHANGED, or have new prescriptions. If we are uncertain of the size of tablets/capsules you have at home, strength may be listed as something that might have changed.        Dose / Directions    calcium acetate 667 MG Caps capsule   Commonly known as:  PHOSLO   This may have changed:  how much to take   Used for:  Chronic kidney disease, unspecified CKD stage        Dose:  667 mg   Take 1 capsule (667 mg) by mouth 3 times daily (with meals)   Quantity:  180 capsule   Refills:  0       warfarin 1 MG tablet   Commonly known as:  COUMADIN   This may have changed:    - how much to take  - additional instructions   Used for:  Atrial fibrillation with rapid ventricular response (H)        Dose:  4 mg   Take 4 tablets (4 mg) by mouth daily for 2 days   Quantity:  8 tablet   Refills:  0         CONTINUE these medicines which have NOT CHANGED        Dose / Directions    ACETAMINOPHEN PO        Dose:  650 mg   Take 650 mg by mouth every 4 hours as needed for pain For pain 1-5 out of 10   Refills:  0       acidophilus tablet   Used for:  Urinary tract infection without hematuria, site unspecified, Cellulitis of lower extremity, unspecified laterality        Dose:  1 tablet   Take 1 tablet by mouth daily   Refills:  0       Calcium Carb-Cholecalciferol  500-400 MG-UNIT Tabs        Dose:  1 tablet   Take 1 tablet by mouth 2 times daily   Refills:  0       cyanocobalamin 1000 MCG/ML injection   Commonly known as:  VITAMIN B12        Dose:  1 mL   Inject 1 mL into the muscle every 30 days   Refills:  0       darbepoetin hira 60 MCG/0.3ML injection   Commonly known as:  ARANESP (ALBUMIN FREE)   Used for:  Anemia in stage 5 chronic kidney disease (H), CKD (chronic kidney disease) stage 5, GFR less than 15 ml/min (H)        Dose:  60 mcg   Inject 0.3 mLs (60 mcg) Subcutaneous every 14 days As needed for hgb<10g/dL.  If Hgb increases >1 point in 2 weeks (if blood transfusion given, use hgb PRIOR to this), SYSTOLIC BP > 180 mmHg or hgb>=10g/dL, HOLD DOSE. Dose must be within 1 week of Hgb.  Per anemia protocol with Vibha Barfield MD/Yesy Samaniego,PharmD [phone]   Quantity:  0.3 mL   Refills:  99       fluticasone 50 MCG/ACT spray   Commonly known as:  FLONASE   Used for:  Nasal congestion        Dose:  1 spray   Spray 1 spray into both nostrils daily   Quantity:  1 Bottle   Refills:  0       ipratropium - albuterol 0.5 mg/2.5 mg/3 mL 0.5-2.5 (3) MG/3ML neb solution   Commonly known as:  DUONEB        Dose:  1 vial   Take 1 vial by nebulization 3 times daily   Refills:  0       metoprolol 50 MG tablet   Commonly known as:  LOPRESSOR   Used for:  Atrial fibrillation with rapid ventricular response (H)        Dose:  25 mg   Take 0.5 tablets (25 mg) by mouth 2 times daily   Quantity:  180 tablet   Refills:  3       nitroGLYcerin 0.4 MG sublingual tablet   Commonly known as:  NITROSTAT   Used for:  Hx of non-ST elevation myocardial infarction (NSTEMI), Atherosclerosis of native coronary artery of native heart without angina pectoris, Postsurgical aortocoronary bypass status        Dose:  0.4 mg   Place 1 tablet (0.4 mg) under the tongue every 5 minutes as needed for chest pain   Quantity:  25 tablet   Refills:  0       order for DME   Used for:  ANABEL (obstructive sleep  apnea)        Equipment being ordered: cpap machine, mask, humidifier, tubing and filters   Quantity:  1 Device   Refills:  0       pentoxifylline 400 MG CR tablet   Commonly known as:  TRENtal   Used for:  Venous stasis ulcer (H)        Dose:  400 mg   Take 1 tablet (400 mg) by mouth daily   Refills:  0       pramipexole 0.125 MG tablet   Commonly known as:  MIRAPEX   Used for:  Restless leg syndrome        Dose:  0.125 mg   Take 1 tablet (0.125 mg) by mouth 3 times daily   Quantity:  90 tablet   Refills:  3       pravastatin 40 MG tablet   Commonly known as:  PRAVACHOL   Used for:  Hx of non-ST elevation myocardial infarction (NSTEMI), Atherosclerosis of native coronary artery of native heart without angina pectoris, Type 2 diabetes mellitus with other circulatory complications        Dose:  40 mg   Take 1 tablet (40 mg) by mouth daily   Quantity:  90 tablet   Refills:  3         STOP taking     amLODIPine 5 MG tablet   Commonly known as:  NORVASC           aspirin 81 MG tablet           calcitRIOL 0.5 MCG capsule   Commonly known as:  ROCALTROL           ferrous sulfate 325 (65 FE) MG tablet   Commonly known as:  IRON           gabapentin 300 MG capsule   Commonly known as:  NEURONTIN           guaiFENesin-dextromethorphan 100-10 MG/5ML syrup   Commonly known as:  ROBITUSSIN DM           levofloxacin 500 MG tablet   Commonly known as:  LEVAQUIN           sodium bicarbonate 650 MG tablet           torsemide 20 MG tablet   Commonly known as:  DEMADEX           vancomycin 50 mg/mL Liqd solution   Commonly known as:  VANOCIN                Where to get your medicines      Some of these will need a paper prescription and others can be bought over the counter. Ask your nurse if you have questions.     You don't need a prescription for these medications     amiodarone 200 MG tablet    amiodarone HCl 400 MG Tabs    cholecalciferol 5000 UNITS Caps    doxercalciferol 4 MCG/2ML Soln injection    warfarin 1 MG tablet          Information about where to get these medications is not yet available     ! Ask your nurse or doctor about these medications     clonazePAM 0.5 MG tablet                Protect others around you: Learn how to safely use, store and throw away your medicines at www.disposemymeds.org.             Medication List: This is a list of all your medications and when to take them. Check marks below indicate your daily home schedule. Keep this list as a reference.      Medications           Morning Afternoon Evening Bedtime As Needed    ACETAMINOPHEN PO   Take 650 mg by mouth every 4 hours as needed for pain For pain 1-5 out of 10   Last time this was given:  650 mg on 10/7/2017  9:14 AM                                acidophilus tablet   Take 1 tablet by mouth daily                                * amiodarone HCl 400 MG Tabs   Take 400 mg by mouth 2 times daily for 6 days   Last time this was given:  400 mg on 10/18/2017 12:23 PM                                * amiodarone 200 MG tablet   Commonly known as:  PACERONE/CODARONE   Take 1 tablet (200 mg) by mouth daily   Start taking on:  10/24/2017   Last time this was given:  400 mg on 10/18/2017 12:23 PM                                calcium acetate 667 MG Caps capsule   Commonly known as:  PHOSLO   Take 1 capsule (667 mg) by mouth 3 times daily (with meals)                                Calcium Carb-Cholecalciferol 500-400 MG-UNIT Tabs   Take 1 tablet by mouth 2 times daily                                cholecalciferol 5000 UNITS Caps   Take 1 capsule (5,000 Units) by mouth daily   Last time this was given:  5,000 Units on 10/18/2017  8:29 AM                                clonazePAM 0.5 MG tablet   Commonly known as:  klonoPIN   Take 0.5 tablets (0.25 mg) by mouth 2 times daily as needed for anxiety   Last time this was given:  0.25 mg on 10/18/2017  8:29 AM                                cyanocobalamin 1000 MCG/ML injection   Commonly known as:  VITAMIN B12   Inject 1  mL into the muscle every 30 days                                darbepoetin hira 60 MCG/0.3ML injection   Commonly known as:  ARANESP (ALBUMIN FREE)   Inject 0.3 mLs (60 mcg) Subcutaneous every 14 days As needed for hgb<10g/dL.  If Hgb increases >1 point in 2 weeks (if blood transfusion given, use hgb PRIOR to this), SYSTOLIC BP > 180 mmHg or hgb>=10g/dL, HOLD DOSE. Dose must be within 1 week of Hgb.  Per anemia protocol with Vibha Barfield MD/Yesy Samaniego,PharmD [phone]                                doxercalciferol 4 MCG/2ML Soln injection   Commonly known as:  HECTOROL   Inject 1 mL (2 mcg) into the vein See Admin Instructions   Last time this was given:  2 mcg on 10/18/2017  9:38 AM                                fluticasone 50 MCG/ACT spray   Commonly known as:  FLONASE   Spray 1 spray into both nostrils daily                                ipratropium - albuterol 0.5 mg/2.5 mg/3 mL 0.5-2.5 (3) MG/3ML neb solution   Commonly known as:  DUONEB   Take 1 vial by nebulization 3 times daily   Last time this was given:  3 mLs on 10/18/2017  8:07 AM                                metoprolol 50 MG tablet   Commonly known as:  LOPRESSOR   Take 0.5 tablets (25 mg) by mouth 2 times daily   Last time this was given:  25 mg on 10/18/2017 12:23 PM                                nitroGLYcerin 0.4 MG sublingual tablet   Commonly known as:  NITROSTAT   Place 1 tablet (0.4 mg) under the tongue every 5 minutes as needed for chest pain                                order for DME   Equipment being ordered: cpap machine, mask, humidifier, tubing and filters                                pentoxifylline 400 MG CR tablet   Commonly known as:  TRENtal   Take 1 tablet (400 mg) by mouth daily   Last time this was given:  400 mg on 10/18/2017 12:23 PM                                pramipexole 0.125 MG tablet   Commonly known as:  MIRAPEX   Take 1 tablet (0.125 mg) by mouth 3 times daily   Last time this was given:  0.125 mg on  10/18/2017 12:23 PM                                pravastatin 40 MG tablet   Commonly known as:  PRAVACHOL   Take 1 tablet (40 mg) by mouth daily                                warfarin 1 MG tablet   Commonly known as:  COUMADIN   Take 4 tablets (4 mg) by mouth daily for 2 days   Last time this was given:  4 mg on 10/17/2017  5:47 PM                                * Notice:  This list has 2 medication(s) that are the same as other medications prescribed for you. Read the directions carefully, and ask your doctor or other care provider to review them with you.

## 2017-10-02 NOTE — PROGRESS NOTES
"CWOCN consult placed by RN for \"wound\" using per scope of practice.  Discussed consult with RN that the consult was for wounds on bilateral LE.    NOTE- RN may place a consult for suspected pressure injuries only, when using per scope of practice.    Will complete the consult at this time and await consult from provider for the non pressure injury wounds on the bilateral LE    No face to face time.   "

## 2017-10-02 NOTE — IP AVS SNAPSHOT
"` `     [facility] CARDIAC SPECIALTY CARE: [phone]                                              INTERAGENCY TRANSFER FORM - NURSING   10/2/2017                    Hospital Admission Date: 10/2/2017  MARI HERNANDEZ   : 1940  Sex: Female        Attending Provider: Rashard Millan MD     Allergies:  Ciprofloxacin, Oxycodone, Lisinopril, Sulfa Drugs    Infection:  None   Service:  HOSPITALIST    Ht:  1.626 m (5' 4\")   Wt:  62.2 kg (137 lb 2 oz)   Admission Wt:  80 kg (176 lb 5.9 oz)    BMI:  23.54 kg/m 2   BSA:  1.68 m 2            Patient PCP Information     Provider PCP Type    Dheeraj Jj MD General      Current Code Status     Date Active Code Status Order ID Comments User Context       Prior      Code Status History     Date Active Date Inactive Code Status Order ID Comments User Context    10/18/2017  9:22 AM  Full Code [id number]  Hank Babcock MD Outpatient    10/2/2017  3:17 AM 10/18/2017  9:22 AM Full Code [id number]  Beth Laird MD Inpatient    2017 10:58 PM 9/15/2017  7:27 PM Full Code [id number]  Mike Tadeo MD Inpatient    2017 11:47 AM 2017 10:58 PM Full Code [id number]  Mike Tadeo MD Outpatient    2017  9:59 AM 2017 11:47 AM Full Code [id number]  Cameron Burrell MD Inpatient    2017 11:32 AM 2017  9:59 AM Full Code [id number]  L[place] Pierre MD Outpatient    2017  4:31 PM 2017 11:32 AM Full Code [id number]  Gilda Muro MD Inpatient    2017  3:54 PM 2017  4:31 PM Full Code [id number]  Damian Contreras MD Outpatient    2017 10:16 PM 2017  3:54 PM Full Code [id number]  Maegan Xavier MD Inpatient    2017  2:14 PM 2017  3:28 PM Full Code [id number]  Erin Pinedo MD Inpatient    6/15/2017  4:01 PM 2017  2:14 PM Full Code [id number]  Arash Silva MD Outpatient    2017  8:17 PM 6/15/2017  4:01 PM Full Code [id number]  Mike Wong MD " Inpatient    6/6/2017  1:12 PM 6/12/2017  8:17 PM Full Code [id number]  Kenji Fish MD Outpatient    5/31/2017  9:20 PM 6/6/2017  1:12 PM Full Code [id number]  Mike Wong MD Inpatient    5/2/2017 10:00 AM 5/31/2017  9:20 PM Full Code [id number]  Jian Hartley MD Outpatient    4/28/2017  7:16 PM 5/2/2017 10:00 AM Full Code [id number]  Jian Hartley MD Inpatient    7/6/2016  1:46 PM 4/28/2017  7:16 PM Full Code [id number]  Isadora Mosley PA-C Outpatient    7/6/2016 10:41 AM 7/6/2016  1:46 PM Full Code [id number]  Arash Silva MD Outpatient    7/3/2016 11:12 AM 7/6/2016 10:41 AM Full Code [id number]  Isadora Mosley PA-C Inpatient    7/2/2016  5:08 AM 7/3/2016 11:12 AM Full Code [id number]  Mike Wong MD Inpatient    3/23/2015  7:40 PM 3/25/2015  1:40 PM Full Code [id number]  Luisa Adan MD Inpatient    12/28/2014  9:42 AM 3/23/2015  7:40 PM Full Code [id number]  Jian Hartley MD Outpatient    12/27/2014  4:26 PM 12/28/2014  9:42 AM Full Code [id number]  Mick Dale MD ED    12/9/2014  2:02 PM 12/27/2014  4:26 PM Full Code [id number]  Jian Hartley MD Outpatient    11/13/2014 11:04 AM 12/9/2014  2:02 PM Full Code [id number]  Jian Hartley MD Outpatient    11/10/2014  6:44 PM 11/13/2014 11:04 AM Full Code [id number]  Mike Wong MD Inpatient    9/14/2014  1:12 PM 11/10/2014  6:44 PM Full Code [id number]  Jian Hartley MD Outpatient    9/12/2014  1:02 PM 9/14/2014  1:12 PM Full Code [id number]  Radha Field PA-C Inpatient    7/20/2014  9:02 AM 9/12/2014  1:02 PM Full Code [id number]  Cesar Lafleur MD Outpatient    7/15/2014  5:49 PM 7/20/2014  9:02 AM Full Code [id number]  Rdaha Field PA-C Inpatient    2/22/2014  4:21 AM 2/22/2014  2:33 PM Full Code [id number]  Mike Ogden MD ED    1/17/2014  8:18 PM 1/18/2014 12:34 PM Full Code [id number]  Judson Gan RN Inpatient    2/1/2013  2:01 PM 1/17/2014  8:18 PM Full  Code [id number]  Nadira Mccann MD Outpatient    2/1/2013 12:18 AM 2/1/2013  2:01 PM Full Code [id number]  Rajinder Dwyer MD Inpatient    1/31/2013  9:39 PM 2/1/2013 12:18 AM Full Code [id number]  Arash Silva MD Outpatient    1/31/2013  7:09 PM 1/31/2013  9:39 PM Full Code [id number]  Radha Field PA-C Inpatient    7/10/2008  2:44 PM 1/31/2013  7:09 PM None None HEALTH CARE DIRECTIVE ON FILE 7-3-08 Melina Art      Advance Directives        Does patient have a scanned Advance Directive/ACP document in EPIC?           Yes        Hospital Problems as of 10/18/2017              Priority Class Noted POA    Septic shock (H) Medium  10/2/2017 Yes      Non-Hospital Problems as of 10/18/2017              Priority Class Noted    Sleep apnea Medium  Unknown    Restless leg syndrome Low  Unknown    Lipoma of other skin and subcutaneous tissue Medium  9/7/2004    ESOPHAGEAL REFLUX Medium  3/28/2006    Postsurgical aortocoronary bypass status Low  1/27/2007    Iron deficiency anemia Medium  10/17/2007    History of peptic ulcer disease Low  10/17/2007    Edema Medium  1/31/2008    Kidney stone Medium  12/29/2009    Hyperlipidemia LDL goal <100 Medium  10/31/2010    Advanced directives, counseling/discussion Low  11/12/2012    CAD - NSTEMI, CABG x 5 2007, angio in 2013 with patent grafts other than occluded graft to PL Medium  2/4/2013    Hx of non-ST elevation myocardial infarction (NSTEMI) Medium  2/4/2013    Health Care Home Medium  1/20/2014    Lymphedema Medium  11/10/2014    Anemia of chronic renal failure, stage 4 (severe) (H) Medium  11/10/2014    Renal failure Medium  3/23/2015    Osteoarthritis of hip Medium  4/29/2015    Non morbid obesity, unspecified obesity type Medium  11/1/2015    Type 2 diabetes mellitus with other circulatory complications Medium  1/19/2016    Long-term (current) use of anticoagulants [Z79.01] Medium  3/4/2016    Essential hypertension with goal blood pressure less than  140/90 Medium  9/13/2016    Atrial fibrillation with rapid ventricular response (H) Medium  4/28/2017    Rash - redness medial thighs Medium  4/29/2017    Metabolic acidosis, normal anion gap (NAG) Medium  4/30/2017    Pulmonary hypertension Medium  5/24/2017    Chronic heart failure (H) with preserved EF Medium  5/31/2017    Atrial flutter (H) - present 6/12 Medium  5/31/2017    Generalized edema - anasarca Medium  6/1/2017    Hypertensive heart and chronic kidney disease with heart failure and stage 1 through stage 4 chronic kidney disease, or unspecified chronic kidney disease Medium  6/1/2017    Supratherapeutic INR Medium  6/1/2017    Proteinuria 2.1 g/g Cr Medium  6/2/2017    Bradycardia Medium  6/2/2017    Acute on chronic renal failure (H) Medium  6/12/2017    Memory loss Medium  6/12/2017    Chronic atrial fibrillation (H) on 6/12-6/13 Medium  6/12/2017    Blood in stool Medium  6/14/2017    Microscopic hematuria Medium  6/21/2017    Acute kidney injury (nontraumatic) (H) Medium  6/21/2017    CHF (congestive heart failure) (H) Medium  7/6/2017    Anemia Medium  7/19/2017    Anemia, iron deficiency Medium  8/9/2017    Acidosis Medium  8/12/2017    Pulmonary edema Medium  8/26/2017    Volume overload Medium  9/13/2017    CKD (chronic kidney disease) stage 5, GFR less than 15 ml/min (H) Medium  9/19/2017    Anemia in stage 5 chronic kidney disease (H) Medium  9/20/2017      Immunizations     Name Date      HEPA 10/13/98     HEPA 03/11/98     HepB 09/25/00     HepB 10/19/99     HepB 08/10/99     Influenza (High Dose) 3 valent vaccine 09/21/17     Influenza (High Dose) 3 valent vaccine 09/13/16     Influenza (High Dose) 3 valent vaccine 09/23/15     Influenza (High Dose) 3 valent vaccine 09/24/14     Influenza (High Dose) 3 valent vaccine 12/06/13     Influenza (High Dose) 3 valent vaccine 09/25/12     Influenza (IIV3) 10/01/10     Influenza (IIV3) 10/21/09     Influenza (IIV3) 12/03/08     Influenza (IIV3)  12/06/07     Influenza (IIV3) 04/02/07     Influenza (IIV3) 11/29/06     Influenza (IIV3) 11/03/05     Influenza (IIV3) 10/29/04     Influenza (IIV3) 11/10/03     Influenza (IIV3) 10/16/02     Influenza (IIV3) 11/16/01     Meningococcal (Menomune ) 04/02/07     Pneumococcal (PCV 13) 09/13/16     Pneumococcal 23 valent 11/17/14     Pneumococcal 23 valent 12/03/08     Poliovirus, inactivated (IPV) 04/02/07     TD (ADULT, 7+) 10/31/10     TD (ADULT, 7+) 08/07/00     TD (ADULT, 7+) 06/20/96     Typhoid IM 04/02/07     Yellow Fever 04/02/07     Zoster vaccine, live 11/01/11          END      ASSESSMENT     Discharge Profile Flowsheet     EXPECTED DISCHARGE     Other Resources  Home Care 10/02/17 1024    Expected Discharge Date  10/18/17 (TCU) 10/17/17 2305   [facility]  [facility] beds at the [facility] [phone], Fax: [phone] 10/18/17 1605    DISCHARGE NEEDS ASSESSMENT     PAS Number  [phone] 10/18/17 1614    Concerns To Be Addressed  compliance issue concerns 10/02/17 1024   Senior Linkage Line Referral Placed  10/18/17 10/18/17 1614    Concerns Comments  ? new HD, INR next check 10/02/17 1024   F/U Appointment Brochure Provided  07/02/16 10/02/17 1024    Equipment Currently Used at Home  shower chair;walker, rolling;raised toilet 10/14/17 1425   Existing Resources/Services  Home Care 11/18/14 1451    Transportation Available  family or friend will provide 10/14/17 1425   SKIN      # of Referrals Placed by CTS  Post Acute Facilities 10/18/17 1605   Inspection of bony prominences  Full 10/18/17 1024    Key Recommendations  TBD after surgery 10/06/16 1312   Inspection under devices  Full 10/17/17 1445    Does Patient Need a Referral for Clinic CC  No 03/24/15 1445   Skin WDL  ex 10/18/17 1024    Equipment Used at Home  walker, rolling 07/15/14 1946   Skin Color/Characteristics  redness blanchable;bruised (ecchymotic) 10/18/17 1024    GASTROINTESTINAL (ADULT,PEDIATRIC,OB)     Skin Temperature  warm  "10/18/17 1024    GI WDL  ex 10/18/17 1024   Skin Moisture  dry 10/18/17 1024    Abdominal Appearance  obese 10/18/17 1024   Skin Elasticity  quick return to original state 10/17/17 1734    All Quadrants Bowel Sounds  audible and active in all quadrants 10/17/17 1734   Skin Integrity  bruise(s);scab(s) 10/18/17 1024    Last Bowel Movement  10/18/17 10/18/17 1024   Additional Documentation  Wound (LDA) 10/15/17 0913    GI Signs/Symptoms  diarrhea 10/18/17 1024   Full except areas not inspected   Buttock, left;Buttock, right;Sacrum;Coccyx;Heel, left;Heel, right 10/11/17 0847    Passing flatus  yes 10/17/17 1734   Not Inspected under devices.  Blood pressure cuff 10/11/17 0847    COMMUNICATION ASSESSMENT     SAFETY      Patient's communication style  spoken language (English or Bilingual) 10/01/17 2129   Safety WDL  WDL 10/18/17 1024    FINAL RESOURCES     Safety Equipment  oxygen flowmeter;manual resuscitator/mask/valve in room;suction equipment 10/12/17 1144    Resources List  Skilled Nursing Facility 10/18/17 1605   All Alarms  alarm(s) activated and audible 10/17/17 1734                 Assessment WDL (Within Defined Limits) Definitions           Safety WDL     Effective: 09/28/15    Row Information: <b>WDL Definition:</b> Bed in low position, wheels locked; call light in reach; upper side rails up x 2; ID band on<br> <font color=\"gray\"><i>Item=AS safety wdl>>List=AS safety wdl>>Version=F14</i></font>      Skin WDL     Effective: 09/28/15    Row Information: <b>WDL Definition:</b> Warm; dry; intact; elastic; without discoloration; pressure points without redness<br> <font color=\"gray\"><i>Item=AS skin wdl>>List=AS skin wdl>>Version=F14</i></font>      Vitals     Vital Signs Flowsheet     COMMENTS     Total  0 10/11/17 0610    Comments  OT: with activity  10/15/17 1612   ANALGESIA SIDE EFFECTS MONITORING      VITAL SIGNS     Side Effects Monitoring: Respiratory Quality  R 10/17/17 1316    Temp  99.1  F (37.3  C) " 10/18/17 1141   Side Effects Monitoring: Respiratory Depth  N 10/17/17 1316    Temp src  Oral 10/18/17 1141   Side Effects Monitoring: Sedation Level  1 10/17/17 1316    Resp  16 10/18/17 1141   HEIGHT AND WEIGHT      Pulse  70 10/18/17 0808   Weight  62.2 kg (137 lb 2 oz) 10/18/17 0433    Heart Rate  84 10/18/17 1141   Weight Method  Bed scale 10/16/17 0644    Pulse/Heart Rate Source  Monitor 10/18/17 1141   Bed Scale  Standard (fitted sheet, draw sheet/ pad, cover/flat sheet, blanket, two pillows) 10/14/17 0537    BP  112/66 10/18/17 1141   POSITIONING      BP Location  Left arm 10/18/17 1141   Body Position  independently positioning 10/17/17 1734    OXYGEN THERAPY     Head of Bed (HOB)  HOB at 30-45 degrees 10/17/17 1734    SpO2  95 % 10/18/17 0810   Chair  Upright in chair 10/17/17 1223    O2 Device  None (Room air) 10/18/17 0810   Positioning/Transfer Devices  in use;pillows 10/17/17 1445    FiO2 (%)  35 % 10/13/17 2340   DAILY CARE      Oxygen Delivery  1 LPM 10/13/17 0025   Activity Management  up to commode 10/17/17 1838    PAIN/COMFORT     Activity Assistance Provided  assistance, 1 person 10/17/17 1838    Patient Currently in Pain  denies 10/18/17 0808   Assistive Device Utilized  gait belt;walker 10/17/17 1838    Preferred Pain Scale  word (verbal rating pain scale) 10/11/17 0440   ECG      Patient's Stated Pain Goal  No pain 10/11/17 0440   ECG Rhythm  Sinus rhythm 10/17/17 1316    0-10 Pain Scale  3 10/13/17 1500   Ectopy  PVC 10/12/17 0803    Word Pain Scale  0 10/11/17 0440   Ectopy Frequency  Occasional 10/12/17 0803    Pain Location  Buttocks 10/13/17 1500   Lead Monitored  Lead II;V 1 10/14/17 0811    Pain Orientation  Right;Left 10/04/17 0806   Equipment  electrodes changed;telemetry batteries changed 10/16/17 1409    Pain Descriptors  Sore 10/13/17 1500   BRIANNE COMA SCALE      Pain Intervention(s)  Repositioned 10/13/17 1500   Best Eye Response  4-->(E4) spontaneous 10/12/17 5676     Response to Interventions  Absence of nonverbal indicators of pain 10/13/17 0025   Best Motor Response  6-->(M6) obeys commands 10/12/17 1144    CRITICAL-CARE PAIN OBSERVATION TOOL (CPOT)     Best Verbal Response  5-->(V5) oriented 10/12/17 1144    Facial Expression  0 10/11/17 0610   Marathon Coma Scale Score  15 10/12/17 1144    Body Movements  0 10/11/17 0610   POINT OF CARE TESTING      Compliance w/ventilator (intubated patients)  Extubated 10/11/17 0610   Puncture Site  fingertip 10/08/17 1818    Vocalization (extubated patients)  0 10/11/17 0610   Bedside Glucose (mg/dl )   240 mg/dl 10/08/17 1818    Muscle Tension  0 10/11/17 0610                 Patient Lines/Drains/Airways Status    Active LINES/DRAINS/AIRWAYS     Name: Placement date: Placement time: Site: Days: Last dressing change:    Pressure Injury 10/13/17 Coccyx Blanchable redness, intact 10/13/17   1500    5     Wound 04/28/17 Lateral;Left Hip Surgical from previous surgery 04/28/17   1542   Hip   173     Wound 04/28/17 Lower;Left Leg Other (comment) scab dime size LLE 04/28/17   1630   Leg   172     Wound 05/31/17 Left Calf Other (comment) open blisters  05/31/17   2011   Calf   139     Wound 06/12/17 Posterior;Right Calf Ulceration 06/12/17   2100   Calf   127     Wound 06/21/17 Left Calf Abrasion(s) 06/21/17   1531   Calf   119     Wound 07/06/17 Anterior Leg Abrasion(s) quarter sized, weaping 07/06/17   2028   Leg   103     Wound 08/12/17 Bilateral Leg Ulceration;Other (comment)  scattered open ulcerations on lower legs bilateral posterior and anterior  08/12/17   1901   Leg   66     Wound 08/12/17 Bilateral Labia excoration, skin intact 08/12/17   1600   Labia   67     Rash 05/31/17 2013 Left breast patch 05/31/17   2013    139             Patient Lines/Drains/Airways Status    Active PICC/CVC     None            Intake/Output Detail Report     Date Intake         Output   Net    Shift P.O. I.V. IV Piggyback Colloid Blood Components Total  Urine Other Total       Day 10/17/17 0700 - 10/17/17 1459 200 -- -- -- -- 200 200 400 600 -400    Janki 10/17/17 1500 - 10/17/17 2259 120 -- -- -- -- 120 200 -- 200 -80    Noc 10/17/17 2300 - 10/18/17 0659 200 -- -- -- -- 200 100 -- 100 100    Day 10/18/17 0700 - 10/18/17 1459 -- -- -- -- -- -- -- -- -- 0    Janki 10/18/17 1500 - 10/18/17 2259 -- -- -- -- -- -- -- -- -- 0      Last Void/BM       Most Recent Value    Urine Occurrence 1 at 10/18/2017 1358    Stool Occurrence 1 at 10/17/2017 1838      Case Management/Discharge Planning     Case Management/Discharge Planning Flowsheet     REFERRAL INFORMATION     Equipment Used at Home  walker, rolling 07/15/14 1946    Arrived From  home or self-care;home healthcare service 06/13/17 1417   FINAL NOTE      # of Referrals Placed by CTS  Post Acute Facilities 10/18/17 1605   Final Note  D/C to [facility] 10/18/17 1614    Post Acute Facilities  TCU 10/18/17 1605   FINAL RESOURCES      [facility] 06/01[facility]17 1318   Equipment Currently Used at Home  shower chair;walker, rolling;raised toilet 10/14/17 1425    Primary Care MD Name  [facility] 06/01/17 1318   Resources List  Skilled Nursing Facility 10/18/17 1605    LIVING ENVIRONMENT     Other Resources  Home Care 10/02/17 1024    Lives With  child(zoe), adult;grandchild(zoe) 10/14/17 1425   [facility] [phone], Fax: [phone] 10/18/17 1605    Living Arrangements  house 10/14/17 1425   PAS Number  [phone] 10/18/17 1614    COPING/STRESS     Senior Linkage Line Referral Placed  10/18/17 10/18/17 1614    Major Change/Loss/Stressor  illness (down about her own illness and hospital stay) 10/02/17 1739   F/U Appointment Brochure Provided  07/02/16 10/02/17 1024    Patient Personal Strengths  motivated;resilient;expressive of emotions;expressive of needs 06/13/17 1419   Existing Resources/Services  Home Care 11/18/14 1451    EXPECTED DISCHARGE     ABUSE RISK  SCREEN      Expected Discharge Date  10/18/17 (TCU) 10/17/17 3965   QUESTION TO PATIENT:  Has a member of your family or a partner(now or in the past) intimidated, hurt, manipulated, or controlled you in any way?  no 10/02/17 1739    ASSESSMENT/CONCERNS TO BE ADDRESSED     QUESTION TO PATIENT: Do you feel safe going back to the place where you are living?  yes 10/02/17 1739    Concerns To Be Addressed  compliance issue concerns 10/02/17 1024   OBSERVATION: Is there reason to believe there has been maltreatment of a vulnerable adult (ie. Physical/Sexual/Emotional abuse, self neglect, lack of adequate food, shelter, medical care, or financial exploitation)?  no 10/02/17 1739    Concerns Comments  ? new HD, INR next check 10/02/17 1024   (R) MENTAL HEALTH SUICIDE RISK      DISCHARGE PLANNING     Are you depressed or being treated for depression?  Yes 10/02/17 1739    Transportation Available  family or friend will provide 10/14/17 1425   HOMICIDE RISK      Key Recommendations  TBD after surgery 10/06/16 1312   Feels Like Hurting Others  no 10/02/17 1739    Does Patient Need a Referral for Clinic CC  No 03/24/15 0797

## 2017-10-02 NOTE — ED PROVIDER NOTES
History     Chief Complaint   Patient presents with     Generalized Weakness     HPI  Kathryn Banks is a 77 year old female who presents for evaluation of worsening fatigue and worsening cough. She was discharged from the ED earlier today with the diagnosis of pneumonia. She did take her levofloxacin earlier today and stated that she did not have any nausea or vomiting afterwards. Dr. Doe, ED M.D., had asked the patient to stay inpatient for her condition, but she refused. She was adamant about not being inpatient. She clarified that she was not refusing care, rather she just did not want to stay in the hospital again as she has had multiple inpatient evaluations and even a nursing home stay recently.  She has had a progressively worsening cough. Please see the ED note excerpt below from earlier today.    Patient was seen earlier today for evaluation of a fall. She fell onto the left back side. She also mentioned to the ED physician that she has had a cough for a few days. When they did the scapula x-ray, there was mention of possible some pulmonary findings. Therefore a full chest x-ray was performed and a pneumonia was noted. Left scapular fracture noted as well. She also was in the ED for evaluation of her venous stasis ulcers.      ED note excerpt from earlier today:  Assessments & Plan (with Medical Decision Making)     (J18.9) Pneumonia of left lung due to infectious organism, unspecified part of lung  Comment:   Plan: Levaquin dosed after discussion with pharmacy.  They recommend 750 mg today then 500 mg every other day.  I offered to keep her in the hospital but she sat straight up in bed and adamantly refused to stay in the hospital.     (S46.433A) Closed nondisplaced fracture of body of left scapula, initial encounter  Comment:   Plan: Ice, continue current pain medications.  Treatment is pain control.     (I87.2,  O14.656) Venous stasis ulcer of other part of lower leg without varicose veins,  unspecified laterality, unspecified ulcer stage (H)  Comment: bilateral  Plan: Her granddaughter will redress her wounds when she gets home.  Follow-up with wound care.              I have reviewed the Medications, Allergies, Past Medical and Surgical History, and Social History in the Epic system.    Allergies:   Allergies   Allergen Reactions     Ciprofloxacin Nausea and Vomiting     Zofran did not help     Oxycodone Visual Disturbance     Delusions, blackouts      Lisinopril Cough     Sulfa Drugs GI Disturbance     LOSS OF TASTE         Current Facility-Administered Medications on File Prior to Encounter:  [COMPLETED] acetaminophen (TYLENOL) tablet 1,000 mg   [COMPLETED] HYDROmorphone (DILAUDID) tablet 2 mg   [COMPLETED] levofloxacin (LEVAQUIN) tablet 750 mg     Current Outpatient Prescriptions on File Prior to Encounter:  ACETAMINOPHEN PO Take 650 mg by mouth every 4 hours as needed for pain For pain 1-5 out of 10   [START ON 10/3/2017] levofloxacin (LEVAQUIN) 500 MG tablet Take 1 tablet (500 mg) by mouth every other day for 5 doses   sodium bicarbonate 650 MG tablet TAKE ONE TABLET BY MOUTH THREE TIMES DAILY   vancomycin (VANOCIN) 50 mg/mL LIQD solution Take 2.5 mLs (125 mg) by mouth 4 times daily for 14 days   darbepoetin hira (ARANESP, ALBUMIN FREE,) 60 MCG/0.3ML injection Inject 0.3 mLs (60 mcg) Subcutaneous every 14 days As needed for hgb<10g/dL. If Hgb increases >1 point in 2 weeks (if blood transfusion given, use hgb PRIOR to this), SYSTOLIC BP > 180 mmHg or hgb>=10g/dL, HOLD DOSE. Dose must be within 1 week of Hgb.  Per anemia protocol with Vibha Barfield MD/Yesy Samaniego,PharmD [phone]   ferrous sulfate (IRON) 325 (65 FE) MG tablet Take 1 tablet (325 mg) by mouth daily (with breakfast)   pramipexole (MIRAPEX) 0.125 MG tablet Take 1 tablet (0.125 mg) by mouth 3 times daily   torsemide (DEMADEX) 20 MG tablet Take 2 tablets (40 mg) by mouth 2 times daily   Warfarin Sodium (COUMADIN PO) Take by mouth  daily Take as directed per INR results   fluticasone (FLONASE) 50 MCG/ACT spray Spray 1 spray into both nostrils daily   guaiFENesin-dextromethorphan (ROBITUSSIN DM) 100-10 MG/5ML syrup Take 5 mLs by mouth every 8 hours as needed for cough or congestion (Patient not taking: Reported on 9/21/2017)   calcitRIOL (ROCALTROL) 0.5 MCG capsule Take 1 capsule (0.5 mcg) by mouth daily   Calcium Carb-Cholecalciferol 500-400 MG-UNIT TABS Take 1 tablet by mouth 2 times daily   ipratropium - albuterol 0.5 mg/2.5 mg/3 mL (DUONEB) 0.5-2.5 (3) MG/3ML neb solution Take 1 vial by nebulization 3 times daily   TRAMADOL HCL PO Take 50 mg by mouth every 6 hours as needed for moderate to severe pain Pain of 6-10 out of 10   cyanocobalamin (VITAMIN B12) 1000 MCG/ML injection Inject 1 mL into the muscle every 30 days   aspirin 81 MG tablet Take 1 tablet (81 mg) by mouth daily   sodium bicarbonate 650 MG tablet Take 2 tablets (1,300 mg) by mouth 3 times daily   nitroGLYcerin (NITROSTAT) 0.4 MG sublingual tablet Place 1 tablet (0.4 mg) under the tongue every 5 minutes as needed for chest pain   gabapentin (NEURONTIN) 300 MG capsule Take 1 capsule (300 mg) by mouth At Bedtime   pravastatin (PRAVACHOL) 40 MG tablet Take 1 tablet (40 mg) by mouth daily   metoprolol (LOPRESSOR) 50 MG tablet Take 0.5 tablets (25 mg) by mouth 2 times daily   amLODIPine (NORVASC) 5 MG tablet Take 2 tablets (10 mg) by mouth daily   calcium acetate (PHOSLO) 667 MG CAPS capsule Take 1 capsule (667 mg) by mouth 3 times daily (with meals)   pentoxifylline (TRENTAL) 400 MG CR tablet Take 1 tablet (400 mg) by mouth daily   Lactobacillus (ACIDOPHILUS) tablet Take 1 tablet by mouth daily   ORDER FOR DME Equipment being ordered: cpap machine, mask, humidifier, tubing and filters       Patient Active Problem List   Diagnosis     Restless leg syndrome     Sleep apnea     Lipoma of other skin and subcutaneous tissue     ESOPHAGEAL REFLUX     Postsurgical aortocoronary bypass  status     Iron deficiency anemia     History of peptic ulcer disease     Edema     Kidney stone     Hyperlipidemia LDL goal <100     Advanced directives, counseling/discussion     CAD - NSTEMI, CABG x 5 2007, angio in 2013 with patent grafts other than occluded graft to PL     Hx of non-ST elevation myocardial infarction (NSTEMI)     Health Care Home     Lymphedema     Anemia of chronic renal failure, stage 4 (severe) (H)     Renal failure     Osteoarthritis of hip     Non morbid obesity, unspecified obesity type     Type 2 diabetes mellitus with other circulatory complications     Long-term (current) use of anticoagulants [Z79.01]     Acute renal failure with tubular necrosis (H)     Essential hypertension with goal blood pressure less than 140/90     Atrial fibrillation with rapid ventricular response (H)     Rash - redness medial thighs     Metabolic acidosis, normal anion gap (NAG)     Pulmonary hypertension     Chronic heart failure (H) with preserved EF     Atrial flutter (H) - present 6/12     Generalized edema - anasarca     Hypertensive heart and chronic kidney disease with heart failure and stage 1 through stage 4 chronic kidney disease, or unspecified chronic kidney disease     Supratherapeutic INR     Proteinuria 2.1 g/g Cr     Bradycardia     Acute on chronic renal failure (H)     Memory loss     Chronic atrial fibrillation (H) on 6/12-6/13     Blood in stool     Microscopic hematuria     Acute kidney injury (nontraumatic) (H)     CHF (congestive heart failure) (H)     Anemia     Anemia, iron deficiency     Acidosis     Pulmonary edema     Volume overload     CKD (chronic kidney disease) stage 5, GFR less than 15 ml/min (H)     Anemia in stage 5 chronic kidney disease (H)       Past Surgical History:   Procedure Laterality Date     ANGIOPLASTY       C APPENDECTOMY       C CABG, VEIN, FIVE  12/06    SVG x 4 and LIMA to LAD     C SOTO W/O FACETEC FORAMOT/DSKC 1/2 VRT SEG, LUMBAR  1968     C REMV CATARACT  "INTRACAP,INSERT LENS      Bilateral     C TOTAL ABDOM HYSTERECTOMY  1995     - fibroids     C VAGOTOMY/PYLOROPLASTY,SELECT  1970     COLONOSCOPY  12/3/2007     COLONOSCOPY  1/17/2014    Procedure: COLONOSCOPY;  Colonoscopy;  Surgeon: Zack Stearns MD;  Location:  GI     CYSTOSCOPY  11/25/09    [facility]     ENDOSCOPY  03/21/2000    [facility]     HC COLONOSCOPY THRU STOMA, DIAGNOSTIC  10/7/04    poor prep, repeat in 2-3 years     HC COLONOSCOPY W/WO BRUSH/WASH  10/31/07    Repeat-poor quality prep     HC REMOVAL OF OVARY/TUBE(S)      Salpingo-Oophorectomy, Unilateral     HC REVISE MEDIAN N/CARPAL TUNNEL SURG      Carpal tunnel release     HC UGI ENDOSCOPY DIAG W BIOPSY  10/31/07     HC UGI ENDOSCOPY, SIMPLE EXAM  12/3/2007     OPEN REDUCTION INTERNAL FIXATION HIP NAILING Left 7/3/2016    Procedure: OPEN REDUCTION INTERNAL FIXATION HIP NAILING;  Surgeon: Lake Schulz MD;  Location:  OR       Social History   Substance Use Topics     Smoking status: Former Smoker     Years: 10.00     Quit date: 1/1/1969     Smokeless tobacco: Never Used     Alcohol use 0.0 oz/week     0 Standard drinks or equivalent per week      Comment: 1/2 a beer once a month       Most Recent Immunizations   Administered Date(s) Administered     HEPA 10/13/1998     HepB 09/25/2000     Influenza (High Dose) 3 valent vaccine 09/21/2017     Influenza (IIV3) 10/01/2010     Meningococcal (Menomune ) 04/02/2007     Pneumococcal (PCV 13) 09/13/2016     Pneumococcal 23 valent 11/17/2014     Poliovirus, inactivated (IPV) 04/02/2007     TD (ADULT, 7+) 10/31/2010     Typhoid IM 04/02/2007     Yellow Fever 04/02/2007     Zoster vaccine, live 11/01/2011       BMI: Estimated body mass index is 29.27 kg/(m^2) as calculated from the following:    Height as of an earlier encounter on 10/1/17: 1.626 m (5' 4\").    Weight as of an earlier encounter on 10/1/17: 77.3 kg (170 lb 8 oz).      Review of Systems   All other systems reviewed and are " negative.      Physical Exam   BP: (!) 86/71  Pulse: 50  Temp: 95  F (35  C)  Resp: (!) 36  SpO2: 96 %  Physical Exam  Very ill-appearing. Pale. Appears very weak. On oxygen by nasal cannula.  Head: Normocephalic, atraumatic, nontender to palpation  Eyes: PERRLA, conjunctiva and sclera clear  Ears: Bilateral TM's and canals are clear.  TM's translucent without erythema or effusion.  Nose: Nares normal and patent bilaterally.  Mucous membranes are non-erythematous and non-edematous.  No sinus tenderness.  Throat: Mucous membranes are dry but otherwise clear from any masses, rashes, or significant erythema. No tonsilar hypertrophy, exudate, or erythema.  Neck: Supple.  FROM without pain.  No adenopathy.  No thyromegaly.   Lungs - poor inspiratory effort. Right lower anterior rhonchi. Tympanitic to percussion throughout.  Heart:  RRR with normal S1 and S2.  No S3 or S4.  No murmur, rub, gallop, or click.  PMI is nondisplaced.   Abdomen: Positive bowel sounds in all four quadrants.  No tenderness to palpation throughout.  No rebound and no guarding.  No hepatosplenomegaly.  No masses.   Extremities - bilateral lower extremity with all skin lesions as noted per the ER dictation earlier today. The dressings were placed by the ED were not removed at this time.        ED Course     ED Course     Nursing evaluated the patient right away and sepsis triggers fired. They initiated 2 large-bore IVs and gave her 1 L of IV fluids.  Unfortunately, I have was not able to see her immediately upon her arrival due to the amount of high acuity patients in the ED at this time. She does have hypotension at this time. Additional laboratory levels have been sent.    EKG findings: sinus bradycardia, normal axis, normal intervals, no acute ST/T changes c/w ischemia, no LVH by voltage criteria, unchanged from previous tracings.  Interpreted by myself.       10:18 PM - I spoke with Dr. Hartley, inpatient hospitalist, regarding the patient. He  does not feel comfortable admitting this patient to our facility given no access to nephrology. She has severe chronic kidney disease and is being set up for dialysis in the near future. Her exam and early laboratory findings are strongly suggestive of severe sepsis with hypotension. We will need to be very cautious with any IV fluid rehydration given her underlying chronic kidney disease and congestive heart failure.    10:23 PM - I spoke with Dr. Zepeda, ED MD, regarding the patient given the significance of her condition and comorbidities. Also she will need to be transferred.  He agreed with the care to this point. We will not give anymore than the 1 L that was given by nursing prior to my evaluation of the patient.   We will need to monitor status closely for possible pressor involvement and monitor her pulmonary status.    10:40 PM - Last blood pressure was 91 systolic. I just finished speaking with the patient and the family. We discussed transfer. We discussed for 10 minutes her options, as she could not initially decide where she wanted to go.. She finally agreed to go to the [facility], which I think is the most appropriate option given that this maintains continuity of care with her current nephrologist.  We placed a page for the intensivist right away.     11:01 - I spoke with Dr. Serra, Intensivist at the [facility], regarding patient. She states that the [facility] is near capacity in the ICU. She was wondering if [facility] would accept the patient. She felt that the patient could tolerate more IV fluids, and recommended that we provide this. She also recommended central line placement prior to transfer. The patient only had 7 cc of urine output in the Cee catheter, even after 1 L of IV fluids. I spoke with Dr. MARINO about these recommendations, and he will help with central line placement.   Dr Serra stated that she would contact [facility]  Hospital herself to see if they would accept the patient, and then placement would contact us back regarding that.     12:01 AM - Central line has been placed by Dr. Zepeda.  1 view CXR ordered.         Procedures             Critical Care time:  was 60 minutes for this patient excluding procedures.       The patient has signs of Severe Sepsis as evidenced by:    1. 2 SIRS criteria, AND  2. Suspected infection, AND   3. Organ dysfunction:  SBP <90, MAP < 65, or SBP decrease of >40 from baseline due to infection    Time severe sepsis diagnosis confirmed = 2157 as this was the time when SBP <90 or MAP <65      3 Hour Severe Sepsis Bundle Completion:  1. Initial Lactic Acid Result:   Recent Labs   Lab Test  10/01/17   2145  08/28/17   0902  08/26/17   1617   LACT  1.6  1.3  0.6*     2. Blood Cultures before Antibiotics: Yes  3. Broad Spectrum Antibiotics Administered: Yes     Anti-infectives (Future)    Start     Dose/Rate Route Frequency Ordered Stop    10/01/17 2240  vancomycin place maki - receiving intermittent dosing      1 each Does not apply SEE ADMIN INSTRUCTIONS 10/01/17 2240          4. 1,000 ml of IV fluids. given initially given her h/o severe CKD and CHF.  No pulmonary edema on repeat exam and central line was placed.  Therefore, another 1 L of IVF bolus was administered.    the patient was transferred out of the ED prior to the 6 hour radha.         Results for orders placed or performed during the hospital encounter of 10/01/17   XR Chest Port 1 View    Narrative    XR CHEST PORT 1 VIEWS   10/2/2017 12:14 AM     INDICATION: Confirm central line placement.    COMPARISON: 10/1/2017 at 0920 hours.      Impression    IMPRESSION: New right IJ line with tip at the level of the cavoatrial  junction. No other significant change. Sternotomy. Borderline  cardiomegaly. Opacity in the left lower hemithorax likely due to a  combination of pleural fluid and infiltrate or atelectasis. Mild  diffuse  interstitial prominence may relate to a shallow inspiration.    BRENT SEGURA MD   XR Chest Port 1 View    Narrative    XR CHEST PORT 1 VIEW   10/2/2017 12:35 AM     INDICATION: Catheter repositioned.    COMPARISON: 10/2/2017 at 0010 hours.      Impression    IMPRESSION: Since the earlier film, the right IJ line has been pulled  back with the tip now in the lower SVC. No other change. Persistent  left lower chest opacity likely due to pleural effusion and associated  infiltrate or atelectasis. Borderline cardiomegaly.    BRENT SEGURA MD   Comprehensive metabolic panel   Result Value Ref Range    Sodium 145 (H) 133 - 144 mmol/L    Potassium 5.6 (H) 3.4 - 5.3 mmol/L    Chloride 120 (H) 94 - 109 mmol/L    Carbon Dioxide 11 (L) 20 - 32 mmol/L    Anion Gap 14 3 - 14 mmol/L    Glucose 323 (H) 70 - 99 mg/dL    Urea Nitrogen 60 (H) 7 - 30 mg/dL    Creatinine 5.23 (H) 0.52 - 1.04 mg/dL    GFR Estimate 8 (L) >60 mL/min/1.7m2    GFR Estimate If Black 10 (L) >60 mL/min/1.7m2    Calcium 7.0 (L) 8.5 - 10.1 mg/dL    Bilirubin Total 0.3 0.2 - 1.3 mg/dL    Albumin 2.0 (L) 3.4 - 5.0 g/dL    Protein Total 5.5 (L) 6.8 - 8.8 g/dL    Alkaline Phosphatase 197 (H) 40 - 150 U/L    ALT 16 0 - 50 U/L    AST 16 0 - 45 U/L   CBC with platelets differential   Result Value Ref Range    WBC 6.7 4.0 - 11.0 10e9/L    RBC Count 3.13 (L) 3.8 - 5.2 10e12/L    Hemoglobin 8.1 (L) 11.7 - 15.7 g/dL    Hematocrit 26.6 (L) 35.0 - 47.0 %    MCV 85 78 - 100 fl    MCH 25.9 (L) 26.5 - 33.0 pg    MCHC 30.5 (L) 31.5 - 36.5 g/dL    RDW 19.2 (H) 10.0 - 15.0 %    Platelet Count 257 150 - 450 10e9/L    Diff Method Automated Method     % Neutrophils 80.8 %    % Lymphocytes 9.8 %    % Monocytes 6.1 %    % Eosinophils 1.2 %    % Basophils 0.9 %    % Immature Granulocytes 1.2 %    Absolute Neutrophil 5.4 1.6 - 8.3 10e9/L    Absolute Lymphocytes 0.7 (L) 0.8 - 5.3 10e9/L    Absolute Monocytes 0.4 0.0 - 1.3 10e9/L    Absolute Eosinophils 0.1 0.0 - 0.7 10e9/L     Absolute Basophils 0.1 0.0 - 0.2 10e9/L    Abs Immature Granulocytes 0.1 0 - 0.4 10e9/L   Lactic acid whole blood   Result Value Ref Range    Lactic Acid 1.6 0.7 - 2.0 mmol/L   UA reflex to Microscopic and Culture   Result Value Ref Range    Color Urine Yellow     Appearance Urine Cloudy     Glucose Urine 50 (A) NEG^Negative mg/dL    Bilirubin Urine Negative NEG^Negative    Ketones Urine Negative NEG^Negative mg/dL    Specific Gravity Urine 1.012 1.003 - 1.035    Blood Urine Small (A) NEG^Negative    pH Urine 5.0 5.0 - 7.0 pH    Protein Albumin Urine 30 (A) NEG^Negative mg/dL    Urobilinogen mg/dL 0.0 0.0 - 2.0 mg/dL    Nitrite Urine Negative NEG^Negative    Leukocyte Esterase Urine Large (A) NEG^Negative    Source Catheterized Urine     RBC Urine 11 (H) 0 - 2 /HPF    WBC Urine >182 (H) 0 - 2 /HPF    WBC Clumps Present (A) NEG^Negative /HPF    Bacteria Urine Moderate (A) NEG^Negative /HPF    Squamous Epithelial /HPF Urine 5 (H) 0 - 1 /HPF   Blood gas venous   Result Value Ref Range    Ph Venous 7.15 (LL) 7.32 - 7.43 pH    PCO2 Venous 31 (L) 40 - 50 mm Hg    PO2 Venous 56 (H) 25 - 47 mm Hg    Bicarbonate Venous 11 (L) 21 - 28 mmol/L    Base Deficit Venous 16.8 mmol/L    FIO2 21      *Note: Due to a large number of results and/or encounters for the requested time period, some results have not been displayed. A complete set of results can be found in Results Review.            Assessments & Plan (with Medical Decision Making)     Severe sepsis (H)  Pneumonia of right lower lobe due to infectious organism (H)  CKD (chronic kidney disease) stage 5, GFR less than 15 ml/min (H)     77 year old female with a history of stage V chronic kidney disease with baseline GFR of 10, CHF, anemia of chronic kidney disease, and other conditions who presents for evaluation of worsening weakness and worsening cough since her ED discharge earlier today. She was seen in the ED earlier today for evaluation of a fall for which she is  suffered a scapular fracture. On the scapular x-ray and noted possible pulmonary abnormalities. On further questioning she had been suffering from a cough for the past 3-4 days. She is noted to have pneumonia. Inpatient treatment for her condition as recommended by the doctor who was seeing her, but the patient declined. She was sent home on p.o. levofloxacin, but her condition continued to worsen. On her exam she was hypotensive and had borderline bradycardia.  Patient appeared acutely ill and pale. Sepsis orders were initiated immediately by nursing and she was given 1 L of IV fluids. Cee catheter placed and only 7 cc were noted after the 1 L of IV fluids. On further questioning, the patient states that she was sitting on the toilet when she was brought to the ED and had urinated just prior to arrival. She has significant rhonchi in her right mid lower anterior, which is where the pneumonia was noted earlier today. Pulmonary exam after the 1 L of IV fluids without rales.   Central line was placed by Dr. Zepeda, ED MD, and a repeat 1 L normal saline bolus was provided. Patient was noted to have an elevated lactate of 1.6. PH of 7.15 with PCO2 of 31 and PO2 of 56. Bicarbonate 11. Urinalysis shows large leukocyte esterase with pyuria and squamous epithelial cells. CMP with a GFR of 8, potassium 5.6, and sodium of 145. Glucose elevated at 323. Her GFR has not changed significantly from her baseline of 10. IV antibiotics were initiated with IV Zosyn and IV vancomycin which were renal dosed by inpatient pharmacy.  Blood cultures were performed prior to IV antibiotic administration. I asked the patient to provide a sputum culture, but she states that she was unable to do so. I spoke with the [facility] regarding transfer for this patient, but their facility was full. [facility] agreed to accept her care. Dr. Laird was consulted as noted in the ED course notes above. The patient  was transferred to [facility] by EMS. Dr. Zepeda was involved with this patient immediately upon my evaluation of the patient as noted above.      1:00 AM - patient's temperature is decreasing. It is now 91  core body temperature was checked 3 different times. Therefore, she was placed on the bear hugger. Warm IV fluids were confirmed. Given her hypotension, we are also given a administer pressors in the form of levophed. Inpatient pharmacy was contacted.  We will also change transferred to air transport. The patient and her friend were updated regarding this and we discussed the seriousness of the situation. We have had to increase her oxygen supplementation to oxy mask using 5 L of oxygen.     1:07 AM - second IV fluid bolus just finished. The patient continues to be hypotensive. I repeated her pulmonary exam, and she does not have any rale development. Continued rhonchi in the right mid to lower anterior as previously noted upon her initial presentation. Therefore, she appears fluid low, and we will give another IV fluid bolus. Urine output was confirmed and she is at 25 cc at this time since moreira placement.       I have reviewed the nursing notes.    I have reviewed the findings, diagnosis, plan and need for follow up with the patient.       Current Discharge Medication List          Final diagnoses:   Severe sepsis (H)   Pneumonia of right lower lobe due to infectious organism (H)   CKD (chronic kidney disease) stage 5, GFR less than 15 ml/min (H)       Disclaimer: This note consists of symbols derived from keyboarding, dictation and/or voice recognition software. As a result, there may be errors in the script that have gone undetected. Please consider this when interpreting information found in this chart.      10/1/2017   Vivek Cordero PA-C   [facility] EMERGENCY DEPARTMENT     Vivek Cordero PA-C  10/02/17 0208       Vivek Cordero PA-C  10/02/17 0226

## 2017-10-02 NOTE — PROGRESS NOTES
"[facility] Nurse Inpatient Wound Assessment     Initial Assessment of wound(s) on pt's:   Bilateral LE        Data:   Patient History:      per MD note(s): 77 y.o woman with CAD s/p CABG x 5, CKD V, hypertension and lymphedema, who was transferred from [facility]. Pt fell 5 days ago. She presented to the ER for the fall. Xrays showed fracture interior scapula and LLL. She was advised inpatient treatment, but she wanted to go home. She was discharged with Levaquin, only to came back with worsening cough. She was then diagnosed with sepsis presumed from PNA. She was started on IV antibiotics, and it looks like she was given two liters of IVF. Then she was transferred her for further management     Allan Assessment and sub scores:   Allan Score  Av.5  Min: 14  Max: 19    Positioning: Pillows,     Mattress:  Standard , Atmos Air    Moisture Management:  dressings    Current Diet / Nutrition:           Active Diet Order      Clear Liquid Diet    Labs:   Recent Labs   Lab Test  10/02/17   1045   10/01/17   2145   17   0552   17   0658   ALBUMIN   --    --   2.0*   < >   --    < >   --    HGB  8.1*   --   8.1*   < >  7.3*   < >   --    INR  6.00*   < >   --    < >   --    < >   --    WBC  10.1   --   6.7   < >  7.0   < >   --    A1C   --    --    --    --    --    --   7.3*   CRP   --    --    --    --   30.0*  30.0*   < >   --     < > = values in this interval not displayed.       Wound Assessment (location):   Bilateral LE  Wound History:  Pt with chronic wounds to bilateral LE.  Per pt is sounds like her internist and home care WOCNurse have been managing wounds.  Home care does dressing changes every other day and current plan is Medihoney with Adapitic and kerlix.  It sounds like pt has tried lymphedema wraps in the past but it \"ripped her skin off\".    Bilateral LE- +2-3 pitting edema with the calf areas woody to the touch.  Both legs with bright red, warm " "erythema but the right is especially red, larger area and warmer to the touch.  Pt has numerous wounds on bilateral LE, all with similar appearance:  Left LE there are 4 wounds on anterior area and the right LE has about 10 wounds anterior to lateral area.  Wounds are covered in moist, tannish slough.  Small serous drainage.  Wounds are painful to the touch.  Wounds are about 0.3cm x 0.3cm and up to about 2.5cm x 1.5cm            Intervention:     Patient's chart evaluated.      Wound(s) assessed as noted above    Wound Care: see plan below for wound care done today    Orders  Written    Supplies  Reviewed    Discussed plan of care with Patient, Family and Physicians Assistant- STEVE Lazaro- requested lymphedema consult for bilateral LE when appropriate          Assessment:       Numerous slough covered wounds on bilateral LE.  Right LE especially looks hot and infected.  Will use Iodosorb Gel on wounds and Mepilex Lite Foam.  Recommend lymphedema to help with wound healing and prevention         Plan:     Nursing to notify the Provider(s) and re-consult the [facility] Nurse if wound(s) deteriorate(s) or if the wound care plan needs reevaluation.    Plan of care for wound located on bilateral LE: daily and prn.  1. Clean wound with MicroKlenz Spray, pat dry  2. Paint with No Sting Skin Prep (#[id number])) and allow to dry thoroughly  3. Apply small dab of Iodosorb Gel (#[id number]) to each wound bed only  4. Press a Mepilex Lite (#[id number]) to the wounds, making sure to conform nicely to skin curvatures, secure with Luís or kerlix.  5. Time and date dressing change and radha with a \"T\" for treatment of a wound  6. Reposition pt every 1 to 2 hours when in bed and hourly when up to the chair to relieve pressure and promote perfusion to tissue  NOTE:  Iodosorb Gel should not be used longer than 14 days. After 14 days the gel should be stopped and a new plan established, this may mean only a simple, gauze dressing.  The Iodosorb Gel " should be stopped after use on October 16, 2017  Keep heels elevated at all times- one pillow under each leg with pillows running from ankles to knees.  Lymphedema per MD and lymphedema therapists.         WOC Nurse will return: weekly and prn     Face to face time: 31-45 Minutes

## 2017-10-02 NOTE — IP AVS SNAPSHOT
` `     [facility] CARDIAC SPECIALTY CARE: [phone]                 INTERAGENCY TRANSFER FORM - NOTES (H&P, Discharge Summary, Consults, Procedures, Therapies)   10/2/2017                    Hospital Admission Date: 10/2/2017  KATHRYN BANKS   : 1940  Sex: Female        Patient PCP Information     Provider PCP Type    Dheeraj Jj MD General         History & Physicals      H&P by Beth Laird MD at 10/2/2017  3:24 AM     Author:  Beth Laird MD Service:  ICU Author Type:  Physician    Filed:  10/2/2017  5:39 AM Date of Service:  10/2/2017  3:24 AM Creation Time:  10/2/2017  3:24 AM    Status:  Signed :  Beth Laird MD (Physician)         [facility]    History and Physical  [facility] Intensive Care     Date of Admission:  10/2/2017  Date of Service (when I saw the patient): 10/02/17[AB1.1]    Assessment & Plan[AB1.2]   Kathryn Banks is a 77 year old female who presents with[AB1.1] hypotension, cough after recent admission.[AB1.3]     Neurology:[AB1.1]  Alert and appropriate[AB1.4]:   -[AB1.1] she is slightly confused about details of recent medical illness but is redirectable and appropriate[AB1.4]    Cardiovascular:[AB1.1]  Septic shock: multiple potential sources of infection[AB1.4]  HFpEF[AB1.3]:[AB1.1] recently treated for acute on chronic diastolic heart failure[AB1.3]   -[AB1.1] vasopressin at 2.4u/hr ; norepinephrine titrate to goal MAP > 65  - trial diuresis given diastolic heartfailure[AB1.4]    Pulmonary:[AB1.1]        Acute respiratory failure[AB1.4]:[AB1.1] pulmonary edema + possible pneumonia[AB1.4]  -[AB1.1] supplemental oxygen  - nebulized bronchodilator and sputum collection is possible[AB1.4]    Renal[AB1.1]  End stage renal disease, hyperkalemia and metabolic acidosis[AB1.4]:[AB1.1] not on hemodialysis[AB1.4]  -[AB1.1] attempt diuresis  - may need HD this admission, no access currently  - insulin and loop diuretic  for hyperkalemia  - bicarbonate for hyperkalemia and metabolic acidosis[AB1.4]    ID:[AB1.1]         c diff colitis[AB1.3]:[AB1.1] diagnosed at [facility] on 9/14  UTI:[AB1.3] Previously Escherichia coli, Enterococcus faecalis, Citrobacter[AB1.4]  Skin cultures: polymicrobial cultures from Aug[AB1.3], based on clinical exam not thought to be actively causing infection[AB1.4]  Possible pneumonia?[AB1.3]  -[AB1.1] pending urine, blood and sputum cultures[AB1.3]  -Empiric therapy with Zosyn and vancomycin[AB1.4]    GI[AB1.1]  C. difficile colitis[AB1.4]:[AB1.1] Continue oral vancomycin[AB1.4]  -    Endocrine:[AB1.1]  Hyperglycemia with history of diabetes[AB1.4]:   -[AB1.1]Initiate insulin infusion[AB1.4]    Heme/Onc:[AB1.1]  Chronic anticoagulation for atrial fibrillation[AB1.4]:[AB1.1] INR 5.71[AB1.4]  -[AB1.1] hold warfarin  - vitamin k in anticipation of possible procedure (dialysis catheter placement)[AB1.4]    DVT Prophylaxis:[AB1.1] Warfarin[AB1.4]  GI Prophylaxis:[AB1.1] Not indicated[AB1.3]    Restraints: Restraints for medical healing needed:[AB1.1] NO[AB1.3]    Family update by me today:[AB1.1] Yes[AB1.3]     Beth Laird MD[AB1.1]    Time Spent on this Encounter[AB1.2]   Billing:  I spent[AB1.1] 45[AB1.4] minutes bedside and on the inpatient unit today managing the critical care of Kathryn MICKY Banks in relation to the issues listed in this note.[AB1.1]    Code Status[AB1.2]   Full Code[AB1.3]    Primary Care Physician   Dheeraj Jj    Chief Complaint[AB1.2]   Cough, shoulder pain    History is obtained from the patient and emergency department physician[AB1.3]    History of Present Illness[AB1.2]   Kathryn Banks is a 77 year old female who presents with[AB1.1] shoulder pain to [facility] ED. She came in after a fall today with shoulder pain and left the ED but returned with worsening cough and weakness. She was recently hospitalized at [facility] and treated for multiple infections. She also has nearly end stage  kidney failure and HD planning is underway.[AB1.3] She has been and out of hospitals and transitional care units this year.  She had a hip fracture, volume overload.  Several family members were at bedside this morning and I briefly discussed with them her overall health picture and trajectory.  She reports that she had not previously consider this, currently she wants to live but is requesting a .[AB1.4]    Past Medical History[AB1.2]    I have reviewed this patient's medical history and updated it with pertinent information if needed.[AB1.4]   Past Medical History:   Diagnosis Date     Carpal tunnel syndrome      Coronary atherosclerosis of native coronary artery 2006    5 vessel CABG     OBSTRUCTIVE SLEEP APNEA     using CPAP     Osteoarthrosis, unspecified whether generalized or localized, unspecified site     generalized arthritis, particularly in her hands and feet         Other and combined forms of senile cataract 2000    Bilateral     Other and unspecified hyperlipidemia      RESTLESS LEGS SYNDROME      TENOSYNOV HAND/WRIST NEC 6/30/2006     Type II or unspecified type diabetes mellitus without mention of complication, not stated as uncontrolled 2001    Diabetes mellitus/pt is diet controlled with weight loss     Typical atrial flutter (H) 01/17/2007    ablation 6/7/2017     Unspecified essential hypertension[AB1.5]        Past Surgical History[AB1.2]   I have reviewed this patient's surgical history and updated it with pertinent information if needed.[AB1.4]  Past Surgical History:   Procedure Laterality Date     ANGIOPLASTY       C APPENDECTOMY       C CABG, VEIN, FIVE  12/06    SVG x 4 and LIMA to LAD     C SOTO W/O FACETEC FORAMOT/[facility] 1/2 VRT SEG, LUMBAR  1968     C REMV CATARACT INTRACAP,INSERT LENS      Bilateral     C TOTAL ABDOM HYSTERECTOMY  1995     - fibroids     C VAGOTOMY/PYLOROPLASTY,SELECT  1970     COLONOSCOPY  12/3/2007     COLONOSCOPY  1/17/2014    Procedure: COLONOSCOPY;   Colonoscopy;  Surgeon: Zack Stearns MD;  Location: [facility] GI     CYSTOSCOPY  11/25/09    [facility]     ENDOSCOPY  03/21/2000    Upper GI     HC COLONOSCOPY THRU STOMA, DIAGNOSTIC  10/7/04    poor prep, repeat in 2-3 years     HC COLONOSCOPY W/WO BRUSH/WASH  10/31/07    Repeat-poor quality prep     HC REMOVAL OF OVARY/TUBE(S)      Salpingo-Oophorectomy, Unilateral     HC REVISE MEDIAN N/CARPAL TUNNEL SURG      Carpal tunnel release     HC UGI ENDOSCOPY DIAG W BIOPSY  10/31/07     HC UGI ENDOSCOPY, SIMPLE EXAM  12/3/2007     OPEN REDUCTION INTERNAL FIXATION HIP NAILING Left 7/3/2016    Procedure: OPEN REDUCTION INTERNAL FIXATION HIP NAILING;  Surgeon: Lake Schulz MD;  Location: [facility] OR[AB1.5]       Prior to Admission Medications   Prior to Admission Medications   Prescriptions Last Dose Informant Patient Reported? Taking?   ACETAMINOPHEN PO   Yes No   Sig: Take 650 mg by mouth every 4 hours as needed for pain For pain 1-5 out of 10   Calcium Carb-Cholecalciferol 500-400 MG-UNIT TABS   Yes No   Sig: Take 1 tablet by mouth 2 times daily   Lactobacillus (ACIDOPHILUS) tablet   No No   Sig: Take 1 tablet by mouth daily   ORDER FOR DME   No No   Sig: Equipment being ordered: cpap machine, mask, humidifier, tubing and filters   TRAMADOL HCL PO   Yes No   Sig: Take 50 mg by mouth every 6 hours as needed for moderate to severe pain Pain of 6-10 out of 10   Warfarin Sodium (COUMADIN PO)   Yes No   Sig: Take by mouth daily Take as directed per INR results   amLODIPine (NORVASC) 5 MG tablet   No No   Sig: Take 2 tablets (10 mg) by mouth daily   aspirin 81 MG tablet   No No   Sig: Take 1 tablet (81 mg) by mouth daily   calcitRIOL (ROCALTROL) 0.5 MCG capsule   No No   Sig: Take 1 capsule (0.5 mcg) by mouth daily   calcium acetate (PHOSLO) 667 MG CAPS capsule   No No   Sig: Take 1 capsule (667 mg) by mouth 3 times daily (with meals)   cyanocobalamin (VITAMIN B12) 1000 MCG/ML injection   Yes No   Sig: Inject 1 mL into the  muscle every 30 days   darbepoetin hira (ARANESP, ALBUMIN FREE,) 60 MCG/0.3ML injection   No No   Sig: Inject 0.3 mLs (60 mcg) Subcutaneous every 14 days As needed for hgb<10g/dL.  If Hgb increases >1 point in 2 weeks (if blood transfusion given, use hgb PRIOR to this), SYSTOLIC BP > 180 mmHg or hgb>=10g/dL, HOLD DOSE. Dose must be within 1 week of Hgb.  Per anemia protocol with Vibha Barfield MD/Yesy Samaniego,PharmD [phone]   ferrous sulfate (IRON) 325 (65 FE) MG tablet   No No   Sig: Take 1 tablet (325 mg) by mouth daily (with breakfast)   fluticasone (FLONASE) 50 MCG/ACT spray   No No   Sig: Spray 1 spray into both nostrils daily   gabapentin (NEURONTIN) 300 MG capsule   No No   Sig: Take 1 capsule (300 mg) by mouth At Bedtime   guaiFENesin-dextromethorphan (ROBITUSSIN DM) 100-10 MG/5ML syrup   No No   Sig: Take 5 mLs by mouth every 8 hours as needed for cough or congestion   Patient not taking: Reported on 9/21/2017   ipratropium - albuterol 0.5 mg/2.5 mg/3 mL (DUONEB) 0.5-2.5 (3) MG/3ML neb solution   Yes No   Sig: Take 1 vial by nebulization 3 times daily   levofloxacin (LEVAQUIN) 500 MG tablet   No No   Sig: Take 1 tablet (500 mg) by mouth every other day for 5 doses   metoprolol (LOPRESSOR) 50 MG tablet   No No   Sig: Take 0.5 tablets (25 mg) by mouth 2 times daily   nitroGLYcerin (NITROSTAT) 0.4 MG sublingual tablet   No No   Sig: Place 1 tablet (0.4 mg) under the tongue every 5 minutes as needed for chest pain   pentoxifylline (TRENTAL) 400 MG CR tablet   No No   Sig: Take 1 tablet (400 mg) by mouth daily   pramipexole (MIRAPEX) 0.125 MG tablet   No No   Sig: Take 1 tablet (0.125 mg) by mouth 3 times daily   pravastatin (PRAVACHOL) 40 MG tablet   No No   Sig: Take 1 tablet (40 mg) by mouth daily   sodium bicarbonate 650 MG tablet   No No   Sig: Take 2 tablets (1,300 mg) by mouth 3 times daily   sodium bicarbonate 650 MG tablet   No No   Sig: TAKE ONE TABLET BY MOUTH THREE TIMES DAILY   torsemide  (DEMADEX) 20 MG tablet   No No   Sig: Take 2 tablets (40 mg) by mouth 2 times daily   vancomycin (VANOCIN) 50 mg/mL LIQD solution   No No   Sig: Take 2.5 mLs (125 mg) by mouth 4 times daily for 14 days      Facility-Administered Medications: None     Allergies   Allergies   Allergen Reactions     Ciprofloxacin Nausea and Vomiting     Zofran did not help     Oxycodone Visual Disturbance     Delusions, blackouts      Lisinopril Cough     Sulfa Drugs GI Disturbance     LOSS OF TASTE       Social History[AB1.2]   I have reviewed this patient's social history and updated it with pertinent information if needed. Kathryn Banks[AB1.4]  reports that she quit smoking about 48 years ago. She quit after 10.00 years of use. She has never used smokeless tobacco. She reports that she drinks alcohol. She reports that she does not use illicit drugs.[AB1.5]    Family History[AB1.2]   I have reviewed this patient's family history and updated it with pertinent information if needed.[AB1.4]   Family History   Problem Relation Age of Onset     DIABETES Father      AODM     Neurologic Disorder Father      Parkinson's     Blood Disease Father       from blood clot from leg to lung immediately after hip fracture     DIABETES Paternal Grandmother      adult onset     DIABETES Maternal Grandmother      adult onset     Hypertension No family hx of      CEREBROVASCULAR DISEASE No family hx of[AB1.5]        Review of Systems[AB1.2]   CONSTITUTIONAL:  positive for  malaise  negative for  fevers, chills and sweats  RESPIRATORY:  positive for  dry cough and dyspnea  CARDIOVASCULAR:  negative for  chest pain, palpitations  INTEGUMENT/BREAST:  positive for skin lesion(s)  BEHAVIOR/PSYCH:  negative for increased agitation and anxiety    P[AB1.4]hysical Exam       Temp  Min: 90.1  F (32.3  C)  Max: 95.6  F (35.3  C)[AB1.2]                Vital Signs with Ranges[AB1.1]  Temp:  [90.1  F (32.3  C)-95.6  F (35.3  C)] 90.1  F (32.3  C)  Pulse:  [50]  "50  Heart Rate:  [46-61] 47  Resp:  [12-36] 18  BP: ()/(43-72) 73/49  SpO2:  [85 %-97 %] 93 %  0 lbs 0 oz[AB1.2]    Awake, alert and following commands  PERRL and conjugate gaze[AB1.1]  Oxymizer mask[AB1.4]  Lungs[AB1.1] rhonchi bilaterally[AB1.4]  H-RR w/o murmur  Abdomen-soft, non tender, non distended  Extremities-warm and[AB1.1] 2+[AB1.4] edema[AB1.1], venous stasis ulcers examined on both legs diffuse skin erythema no purulent drainage[AB1.4]  Central line insertion site without inflammation[AB1.1]  Data   Results for orders placed or performed during the hospital encounter of 10/02/17 (from the past 24 hour(s))   Glucose by meter   Result Value Ref Range    Glucose 223 (H) 70 - 99 mg/dL     *Note: Due to a large number of results and/or encounters for the requested time period, some results have not been displayed. A complete set of results can be found in Results Review.[AB1.2]        Revision History        User Key Date/Time User Provider Type Action    > AB1.5 10/2/2017  5:39 AM Beth Laird MD Physician Sign     AB1.4 10/2/2017  5:07 AM Beth Laird MD Physician      AB1.3 10/2/2017  3:26 AM Beth Laird MD Physician      AB1.2 10/2/2017  3:25 AM Beth Laird MD Physician      AB1.1 10/2/2017  3:24 AM Beth Laird MD Physician                   Discharge Summaries     No notes of this type exist for this encounter.         Consult Notes      Consults by Megan Rees RD, LD at 10/17/2017 11:34 AM     Author:  Megan Rees RD, LD Service:  Nutrition Author Type:  Registered Dietitian    Filed:  10/17/2017 11:34 AM Date of Service:  10/17/2017 11:34 AM Creation Time:  10/17/2017 11:29 AM    Status:  Signed :  Megan Rees, RD, LD (Registered Dietitian)         Consult received for \"Renal diet education\"    Pt is currently on a \"Dialysis, Moderate CHO\" diet    Note plans for dc to TCU and pt to continue with OP dialysis    Visited with " pt this morning  Provided and reviewed written handouts on the Dialysis Diet guidelines  Pt with many questions  Asked me to review her magazine with recipes - found some soups she could make  Explained that she would also be followed by a dietitian at her dialysis center  Pt may also speak with dietitian at the TCU prior to going home (if she has more questions)    Megan Rees RD, CAROLE  Clinical Dietitian - [facility]  Pager - [phone][SJ1.1]         Revision History        User Key Date/Time User Provider Type Action    > SJ1.1 10/17/2017 11:34 AM Megan Rees RD, LD Registered Dietitian Sign            Consults by Denis Mart MD at 10/8/2017  2:24 PM     Author:  Denis Mart MD Service:  Cardiology Author Type:  Physician    Filed:  10/8/2017  3:56 PM Date of Service:  10/8/2017  2:24 PM Creation Time:  10/8/2017  2:20 PM    Status:  Signed :  Denis Mart MD (Physician)     Consult Orders:    1. Cardiology IP Consult: Patient to be seen: Routine - within 24 hours; A fib RVR, difficult to control Plase evaluate and assist with treatment.; Consultant may enter orders: Yes [[id number]] ordered by Naman Crisostomo MD at 10/08/17 1049                [facility]    Cardiology Consultation     Date of Admission:  10/2/2017    Assessment & Plan   Kathryn Banks is a 77 year old female who was admitted on 10/2/2017.[TV1.1]    1. Paroxysmal atrial fibrillation with rapid ventricular rate  Atrial fibrillation rate now improved to the 80s to 90s on intravenous diltiazem and oral metoprolol. She is on subcutaneous heparin 3 times daily. Atrial fibrillation likely from stress of sepsis and recent pneumonia and fluid overload due to end-stage renal disease. Since rate better controlled, I would recommend continuing intravenous diltiazem. We'll increase metoprolol to 50 minutes twice daily. If when tolerated worsens, we may consider  intravenous amiodarone. For now we should hold off on it. Her echo cutting them recently showed normal ejection fraction.  Given high chads vascular score of age, female sex, hypertension and diabetes, score of 5, she should be considered for long-term anticoagulation at discharge.    2. sepsis and pneumonia- respiratory  failure    improving. On antibiotics. Continue dialysis per protocol  Still on high flow oxygen.    3. End-stage renal disease with fluid overload  Continue dialysis with negative fluid balance. She does have pleural effusions, left greater than right and will need to be reevaluated after appropriate dialysis. If significant effusion remains with high oxygen requirements, consider pleural tap.  Patient is to intravenous Bumex per nephrology.    4. History of atrial flutter ablation  Status post cardioversion in June.    5. History of coronary artery disease with previous bypass  Consider ischemic evaluation as an outpatient.  Last in June 2007 showed the graft to the posterior lateral branch was occluded and the rest of the 5 grafts were patent.    Critical care time of 35 minutes spent in evaluation and management of this patient, with respiratory failure, end-stage renal disease and fluid overload, atrial fibrillation with intermittent rapid rate and pleural effusions.[TV1.2]    Denis Mart MD    Primary Care Physician   Dheeraj Jj    Reason for Consult   Reason for consult: I was asked by[TV1.1] Dr Crisostomo[TV1.2] to evaluate this patient for[TV1.1] afib[TV1.2].    History of Present Illness   77 y.o woman with CAD s/p CABG x 5, CKD V, hypertension and lymphedema, who was transferred from [facility]. Pt fell 5 days[TV1.1] before presenting to [facility][TV1.2] ER for the fall. Xrays showed fracture interior scapula and LLL. She was advised inpatient treatment, but she wanted to go home. She was discharged with Levaquin, only to came back with worsening cough. She was  then diagnosed with sepsis presumed from PNA. She was started on IV antibiotics, and she was transferred her for further management.   At [facility], she[TV1.1] started[TV1.2] having intermittent atrial fibrillation with rapid ventricular rate and therefore we were consulted.[TV1.1] The episodes of atrial fibrillation occurring during dialysis that has been started last week.[TV1.2] She also has developed end-stage renal disease and is currently started on dialysis. Nephrology is following. She's had a history of atrial flutter ablation in June 2017. At that time, warfarinto aspirin after successful ablation. Her metoprolol dose was decreased because of bradycardia.    With rapid[TV1.1] atrial fibrillation[TV1.2], she's been on intravenous diltiazem for rate control.[TV1.1] She is on metoprolol 37.5 mg twice daily.[TV1.2] Echo this month had shown normal ejection fraction with mild to moderate RV dilatation and RV dysfunction. Mild/moderate pulmonary hypertension was noted.[TV1.1]    At this time, she continues to be on high flow oxygen. She denies chest pain.[TV1.2]  EKG on 10/4/17 was reviewed by me and revealed atrial fibrillation with rapid rate with ventricular couplet.    Patient Active Problem List   Diagnosis     Restless leg syndrome     Sleep apnea     Lipoma of other skin and subcutaneous tissue     ESOPHAGEAL REFLUX     Postsurgical aortocoronary bypass status     Iron deficiency anemia     History of peptic ulcer disease     Edema     Kidney stone     Hyperlipidemia LDL goal <100     Advanced directives, counseling/discussion     CAD - NSTEMI, CABG x 5 2007, angio in 2013 with patent grafts other than occluded graft to PL     Hx of non-ST elevation myocardial infarction (NSTEMI)     Health Care Home     Lymphedema     Anemia of chronic renal failure, stage 4 (severe) (H)     Renal failure     Osteoarthritis of hip     Non morbid obesity, unspecified obesity type     Type 2 diabetes mellitus with other  circulatory complications     Long-term (current) use of anticoagulants [Z79.01]     Essential hypertension with goal blood pressure less than 140/90     Atrial fibrillation with rapid ventricular response (H)     Rash - redness medial thighs     Metabolic acidosis, normal anion gap (NAG)     Pulmonary hypertension     Chronic heart failure (H) with preserved EF     Atrial flutter (H) - present 6/12     Generalized edema - anasarca     Hypertensive heart and chronic kidney disease with heart failure and stage 1 through stage 4 chronic kidney disease, or unspecified chronic kidney disease     Supratherapeutic INR     Proteinuria 2.1 g/g Cr     Bradycardia     Acute on chronic renal failure (H)     Memory loss     Chronic atrial fibrillation (H) on 6/12-6/13     Blood in stool     Microscopic hematuria     Acute kidney injury (nontraumatic) (H)     CHF (congestive heart failure) (H)     Anemia     Anemia, iron deficiency     Acidosis     Pulmonary edema     Volume overload     CKD (chronic kidney disease) stage 5, GFR less than 15 ml/min (H)     Anemia in stage 5 chronic kidney disease (H)     Septic shock (H)       Past Medical History   I have reviewed this patient's medical history and updated it with pertinent information if needed.   Past Medical History:   Diagnosis Date     Carpal tunnel syndrome      Coronary atherosclerosis of native coronary artery 2006    5 vessel CABG     OBSTRUCTIVE SLEEP APNEA     using CPAP     Osteoarthrosis, unspecified whether generalized or localized, unspecified site     generalized arthritis, particularly in her hands and feet         Other and combined forms of senile cataract 2000    Bilateral     Other and unspecified hyperlipidemia      RESTLESS LEGS SYNDROME      TENOSYNOV HAND/WRIST NEC 6/30/2006     Type II or unspecified type diabetes mellitus without mention of complication, not stated as uncontrolled 2001    Diabetes mellitus/pt is diet controlled with weight loss      Typical atrial flutter (H) 01/17/2007    ablation 6/7/2017     Unspecified essential hypertension        Past Surgical History   I have reviewed this patient's surgical history and updated it with pertinent information if needed.  Past Surgical History:   Procedure Laterality Date     ANGIOPLASTY       C APPENDECTOMY       C CABG, VEIN, FIVE  12/06    SVG x 4 and LIMA to LAD     C SOTO W/O FACETEC FORAMOT/[facility] 1/2 VRT SEG, LUMBAR  1968     C REMV CATARACT INTRACAP,INSERT LENS      Bilateral     C TOTAL ABDOM HYSTERECTOMY  1995     - fibroids     C VAGOTOMY/PYLOROPLASTY,SELECT  1970     COLONOSCOPY  12/3/2007     COLONOSCOPY  1/17/2014    Procedure: COLONOSCOPY;  Colonoscopy;  Surgeon: Zack Stearns MD;  Location:  GI     CYSTOSCOPY  11/25/09    [facility]     ENDOSCOPY  03/21/2000    Upper GI     HC COLONOSCOPY THRU STOMA, DIAGNOSTIC  10/7/04    poor prep, repeat in 2-3 years     HC COLONOSCOPY W/WO BRUSH/WASH  10/31/07    Repeat-poor quality prep     HC REMOVAL OF OVARY/TUBE(S)      Salpingo-Oophorectomy, Unilateral     HC REVISE MEDIAN N/CARPAL TUNNEL SURG      Carpal tunnel release     HC UGI ENDOSCOPY DIAG W BIOPSY  10/31/07     HC UGI ENDOSCOPY, SIMPLE EXAM  12/3/2007     OPEN REDUCTION INTERNAL FIXATION HIP NAILING Left 7/3/2016    Procedure: OPEN REDUCTION INTERNAL FIXATION HIP NAILING;  Surgeon: Lake Schulz MD;  Location:  OR       Prior to Admission Medications   Prior to Admission Medications   Prescriptions Last Dose Informant Patient Reported? Taking?   ACETAMINOPHEN PO prn med  Yes Yes   Sig: Take 650 mg by mouth every 4 hours as needed for pain For pain 1-5 out of 10   Calcium Carb-Cholecalciferol 500-400 MG-UNIT TABS   Yes Yes   Sig: Take 1 tablet by mouth 2 times daily   Lactobacillus (ACIDOPHILUS) tablet   No No   Sig: Take 1 tablet by mouth daily   ORDER FOR DME   No No   Sig: Equipment being ordered: cpap machine, mask, humidifier, tubing and filters   Warfarin Sodium (COUMADIN PO)    Yes Yes   Sig: Take by mouth daily Plan was: 2 mg 9/28, 9/29, 10/1  1 mg 9/30    Take as directed per INR results   amLODIPine (NORVASC) 5 MG tablet   No Yes   Sig: Take 2 tablets (10 mg) by mouth daily   aspirin 81 MG tablet   No Yes   Sig: Take 1 tablet (81 mg) by mouth daily   calcitRIOL (ROCALTROL) 0.5 MCG capsule   No Yes   Sig: Take 1 capsule (0.5 mcg) by mouth daily   calcium acetate (PHOSLO) 667 MG CAPS capsule   No Yes   Sig: Take 1 capsule (667 mg) by mouth 3 times daily (with meals)   Patient taking differently: Take 1,334 mg by mouth 3 times daily (with meals)    cyanocobalamin (VITAMIN B12) 1000 MCG/ML injection   Yes Yes   Sig: Inject 1 mL into the muscle every 30 days   darbepoetin hira (ARANESP, ALBUMIN FREE,) 60 MCG/0.3ML injection   No Yes   Sig: Inject 0.3 mLs (60 mcg) Subcutaneous every 14 days As needed for hgb<10g/dL.  If Hgb increases >1 point in 2 weeks (if blood transfusion given, use hgb PRIOR to this), SYSTOLIC BP > 180 mmHg or hgb>=10g/dL, HOLD DOSE. Dose must be within 1 week of Hgb.  Per anemia protocol with Vibha Barfield MD/Yesy Samaniego,PharmD [phone]   ferrous sulfate (IRON) 325 (65 FE) MG tablet   No Yes   Sig: Take 1 tablet (325 mg) by mouth daily (with breakfast)   Patient taking differently: Take 325 mg by mouth 2 times daily    fluticasone (FLONASE) 50 MCG/ACT spray   No No   Sig: Spray 1 spray into both nostrils daily   gabapentin (NEURONTIN) 300 MG capsule   No Yes   Sig: Take 1 capsule (300 mg) by mouth At Bedtime   guaiFENesin-dextromethorphan (ROBITUSSIN DM) 100-10 MG/5ML syrup prn med  No Yes   Sig: Take 5 mLs by mouth every 8 hours as needed for cough or congestion   ipratropium - albuterol 0.5 mg/2.5 mg/3 mL (DUONEB) 0.5-2.5 (3) MG/3ML neb solution   Yes No   Sig: Take 1 vial by nebulization 3 times daily   levofloxacin (LEVAQUIN) 500 MG tablet   No No   Sig: Take 1 tablet (500 mg) by mouth every other day for 5 doses   metoprolol (LOPRESSOR) 50 MG tablet   No Yes    Sig: Take 0.5 tablets (25 mg) by mouth 2 times daily   nitroGLYcerin (NITROSTAT) 0.4 MG sublingual tablet prn med  No Yes   Sig: Place 1 tablet (0.4 mg) under the tongue every 5 minutes as needed for chest pain   pentoxifylline (TRENTAL) 400 MG CR tablet   No Yes   Sig: Take 1 tablet (400 mg) by mouth daily   pramipexole (MIRAPEX) 0.125 MG tablet   No Yes   Sig: Take 1 tablet (0.125 mg) by mouth 3 times daily   pravastatin (PRAVACHOL) 40 MG tablet   No Yes   Sig: Take 1 tablet (40 mg) by mouth daily   sodium bicarbonate 650 MG tablet   No Yes   Sig: TAKE ONE TABLET BY MOUTH THREE TIMES DAILY   torsemide (DEMADEX) 20 MG tablet   No Yes   Sig: Take 2 tablets (40 mg) by mouth 2 times daily   vancomycin (VANOCIN) 50 mg/mL LIQD solution   No No   Sig: Take 2.5 mLs (125 mg) by mouth 4 times daily for 14 days      Facility-Administered Medications: None     Current Facility-Administered Medications   Medication Dose Route Frequency     albumin human  50 g Intravenous Once     metoprolol  37.5 mg Oral BID     insulin aspart  1-7 Units Subcutaneous Q4H     ipratropium - albuterol 0.5 mg/2.5 mg/3 mL  1 vial Nebulization TID     pentoxifylline  400 mg Oral Daily     sodium chloride (PF)  10 mL Intracatheter Q8H     meropenem  500 mg Intravenous Q24H     bumetanide  2 mg Intravenous Q8H     cholecalciferol  5,000 Units Oral Daily     calcitRIOL  0.25 mcg Oral Daily     heparin  5,000 Units Subcutaneous Q8H     - MEDICATION INSTRUCTIONS for Dialysis Patients -   Does not apply See Admin Instructions     Current Facility-Administered Medications   Medication Last Rate     diltiazem (CARDIZEM) infusion ADULT 15 mg/hr (10/08/17 1024)     dextrose 5% and 0.45% NaCl 20 mL/hr at 10/06/17 1323     IV fluid REPLACEMENT ONLY       Allergies   Allergies   Allergen Reactions     Ciprofloxacin Nausea and Vomiting     Zofran did not help     Oxycodone Visual Disturbance     Delusions, blackouts      Lisinopril Cough     Sulfa Drugs GI  Disturbance     LOSS OF TASTE       Social History    reports that she quit smoking about 48 years ago. She quit after 10.00 years of use. She has never used smokeless tobacco. She reports that she drinks alcohol. She reports that she does not use illicit drugs.    Family History   Family History   Problem Relation Age of Onset     DIABETES Father      AODM     Neurologic Disorder Father      Parkinson's     Blood Disease Father       from blood clot from leg to lung immediately after hip fracture     DIABETES Paternal Grandmother      adult onset     DIABETES Maternal Grandmother      adult onset     Hypertension No family hx of      CEREBROVASCULAR DISEASE No family hx of        Review of Systems   The comprehensive 10 point Review of Systems is negative other than noted in the HPI or here.     Physical Exam   Vital Signs with Ranges  Temp:  [98.1  F (36.7  C)-98.9  F (37.2  C)] 98.1  F (36.7  C)  Heart Rate:  [] 99  Resp:  [0-34] 23  BP: ()/() 117/70  FiO2 (%):  [70 %-95 %] 90 %  SpO2:  [81 %-100 %] 100 %  Wt Readings from Last 4 Encounters:   10/08/17 68.8 kg (151 lb 10.8 oz)   10/01/17 77.3 kg (170 lb 8 oz)   17 77.1 kg (170 lb)   17 76.2 kg (168 lb)     I/O last 3 completed shifts:  In: 814.75 [P.O.:360; I.V.:204.75]  Out: 1320 [Urine:1320]      Vitals: /70  Pulse 114  Temp 98.1  F (36.7  C) (Oral)  Resp 23  Wt 68.8 kg (151 lb 10.8 oz)  SpO2 100%  BMI 26.04 kg/m2[TV1.1]    Constitutional:   awake     Eyes:   extra-ocular muscles intact     ENT:   normocepalic, without obvious abnormality     Neck:   jugular venous distention present 10 cm above sternal notch     Back:   no curvature     Lungs:   crackles right base, left base and decreased AE, left greater than right     Cardiovascular:   irregularly irregular rhythm     Abdomen:   soft and non-distended     Musculoskeletal:   tone is normal     Neurologic:   Motor Exam:  moves all extremities well and  symmetrically     Neuropsychiatric:   Level of consciousness: alert / normal     Skin:   no rashes[TV1.2]       No lab results found in last 7 days.    Invalid input(s): TROPONINIES      Recent Labs  Lab 10/08/17  1230 10/08/17  0411 10/07/17  1825 10/07/17  0855 10/07/17  0442 10/06/17  1330  10/06/17  0930  10/04/17  0440  10/03/17  0425 10/02/17  2100  10/01/17  2145   WBC 8.3  --   --   --  7.2 8.2  --   --   < > 9.0  --  10.8  --   < > 6.7   HGB 7.2*  --   --   --  7.6* 7.7*  --   --   < > 7.1*  < > 6.6*  --   < > 8.1*   MCV 88  --   --   --  86 86  --   --   < > 82  --  81  --   < > 85   *  --   --   --  144* 159  --   --   < > 182  --  219  --   < > 257   INR  --   --   --   --   --   --   --  0.95  --   --   --  1.82* 2.37*  < >  --    NA  --  145* 144  --  147* 148*  --   --   < > 144  --  144 146*  < > 145*   POTASSIUM  --  3.4 3.6 3.6 3.2* 3.4  < > Canceled, Test credited  < > 3.2*  --  3.9 5.1  < > 5.6*   CHLORIDE  --  108 107  --  109 109  --   --   < > 109  --  112* 119*  < > 120*   CO2  --  29 27  --  31 26  --   --   < > 23  --  21 14*  < > 11*   BUN  --  24 21  --  23 19  --   --   < > 26  --  37* 61*  < > 60*   CR  --  3.28* 2.93*  --  3.51* 2.98*  --   --   < > 3.19*  --  3.78* 5.24*  < > 5.23*   GFRESTIMATED  --  14* 16*  --  13* 15*  --   --   < > 14*  --  12* 8*  < > 8*   GFRESTBLACK  --  17* 19*  --  15* 19*  --   --   < > 17*  --  14* 10*  < > 10*   ANIONGAP  --   --  10  --  7 13  --   --   < > 12  --  11 13  < > 14   MALICK  --  7.7* 7.7*  --  7.6* 7.5*  --   --   < > 6.6*  --  6.6* 6.5*  < > 7.0*   GLC  --  137* 211*  --  146* 151*  --   --   < > 157*  --  141* 163*  < > 323*   ALBUMIN  --  2.2* 2.1*  --   --   --   --   --   --   --   --   --   --   --  2.0*   PROTTOTAL  --  5.1*  --   --   --   --   --   --   --  4.8*  --   --   --   --  5.5*   BILITOTAL  --  0.4  --   --   --   --   --   --   --   --   --   --   --   --  0.3   ALKPHOS  --  152*  --   --   --   --   --   --   --   --    --   --   --   --  197*   ALT  --  10  --   --   --   --   --   --   --   --   --   --   --   --  16   AST  --  12  --   --   --   --   --   --   --   --   --   --   --   --  16   < > = values in this interval not displayed.  Recent Labs   Lab Test  04/12/16   1132  06/08/15   1140  01/23/14   0909   CHOL  127  97  133   HDL  65  55  51   LDL  47  15  62   TRIG  73  136  99   CHOLHDLRATIO   --   1.8  3.0       Recent Labs  Lab 10/08/17  1230 10/07/17  0442 10/06/17  1330  10/03/17  1206   WBC 8.3 7.2 8.2  < >  --    HGB 7.2* 7.6* 7.7*  < >  --    HCT 23.0* 24.0* 24.4*  < >  --    MCV 88 86 86  < >  --    * 144* 159  < >  --    IRON  --   --   --   --  38   IRONSAT  --   --   --   --  43   FEB  --   --   --   --  89*   GWEN  --   --   --   --  376*   < > = values in this interval not displayed.    Recent Labs  Lab 10/08/17  1238 10/04/17  1040 10/02/17  2100 10/02/17  1440   PH 7.39  --   --   --    PHV  --  7.29*  Canceled:  Specimen improperly labeled. 7.06* 7.09*   PO2 97  --   --   --    PO2V  --  46  Canceled:  Specimen improperly labeled. 39 43   PCO2 46*  --   --   --    PCO2V  --  47  Canceled:  Specimen improperly labeled. 50 45   HCO3 27  --   --   --    HCO3V  --  22  Canceled:  Specimen improperly labeled. 14* 14*     No results for input(s): NTBNPI, NTBNP in the last 168 hours.  No results for input(s): DD in the last 168 hours.  No results for input(s): SED, CRP in the last 168 hours.    Recent Labs  Lab 10/08/17  1230 10/07/17  0442 10/06/17  1330   * 144* 159     No results for input(s): TSH in the last 168 hours.    Recent Labs  Lab 10/01/17  2255   COLOR Yellow   APPEARANCE Cloudy   URINEGLC 50*   URINEBILI Negative   URINEKETONE Negative   SG 1.012   UBLD Small*   URINEPH 5.0   PROTEIN 30*   NITRITE Negative   LEUKEST Large*   RBCU 11*   WBCU >182*       Imaging:  Recent Results (from the past 48 hour(s))   XR Chest Port 1 View    Narrative    XR CHEST PORT 1 VW 10/7/2017 12:17  PM    HISTORY: pneumonia      Impression    IMPRESSION: Tip of central line in distal SVC. Sternotomy. Diffuse  bilateral pulmonary infiltrates. These have improved in the right  upper lobe compared to 10/5/2017, yet there is increased infiltrate in  the left lung base compared to the previous exam.    LAYNE POWELL MD       Echo:  Recent Results (from the past 4320 hour(s))   ECHO COMPLETE WITH OPTISON    Narrative    [id number]  [id number]  [id number]  [id number]^CHERYLE^GEOVANNA^[facility],[place]  Echocardiography Laboratory  [address] [place] [id number]     Name: MARI HERNANDEZ  MRN: [id number]  : 1940  Study Date: 2017 11:34 AM  Age: 77 yrs  Gender: Female  Patient Location: [facility] [place]  Reason For Study: CHF  Ordering Physician: GEOVANNA LOBATO  Referring Physician: SELF, REFERRED  Performed By: Vanita Carr RDCS     BSA: 1.9 m2  Height: 64 in  Weight: 183 lb  BP: 100/74 mmHg  _____________________________________________________________________________  __        Procedure  Echocardiogram with two-dimensional, color and spectral Doppler performed.  Contrast Optison. Optison (NDC #8911-4320-13) given intravenously. Patient was  given 3 ml mixture of 3 ml Optison and 6 ml saline. 6 ml wasted.  _____________________________________________________________________________  __        Interpretation Summary  Global and regional left ventricular function is normal with an EF of 60-65%.  Global right ventricular function is mildly to moderately reduced.  Estimated pulmonary artery systolic pressure is 44 mmHg plus right atrial  pressure. Estiamted mean RA pressure is 15 mmHg.  No pericardial effusion is present.  _____________________________________________________________________________  __        Left Ventricle  Left ventricular size is normal. Left ventricular wall thickness is normal.  Global and regional left ventricular function is normal with an EF  of 60-65%.  There are features of diastolic dysfunction in the setting of moderate mitral  annular calcification. No regional wall motion abnormalities are seen.     Right Ventricle  Mild to moderate right ventricular dilation is present. Global right  ventricular function is mildly to moderately reduced.     Atria  Severe biatrial enlargement is present.     Mitral Valve  Moderate mitral annular calcification is present. Mild mitral insufficiency is  present.        Aortic Valve  The aortic valve is tricuspid. Mild to moderate aortic valve sclerosis is  present.     Tricuspid Valve  Mild tricuspid insufficiency is present. Estimated pulmonary artery systolic  pressure is 44 mmHg plus right atrial pressure.     Pulmonic Valve  Trace to mild pulmonic insufficiency is present.     Vessels  The aorta root is normal. Dilation of the inferior vena cava is present with  abnormal respiratory variation in diameter. Estimated mean right atrial  pressure is 15 mmHg.     Pericardium  No pericardial effusion is present.        Compared to Previous Study  This study was compared with the study from 17, no significant changes .  _____________________________________________________________________________  __  MMode/2D Measurements & Calculations     IVSd: 0.87 cm  LVIDd: 4.5 cm  LVIDs: 2.7 cm  LVPWd: 1.1 cm  FS: 40.2 %  EDV(Teich): 92.4 ml  ESV(Teich): 26.8 ml  LV mass(C)d: 148.4 grams  LV mass(C)dI: 78.8 grams/m2  asc Aorta Diam: 3.4 cm  LVOT diam: 2.2 cm  LVOT area: 3.8 cm2  LA Volume (BP): 102.0 ml  LA Volume Index (BP): 54.3 ml/m2           Doppler Measurements & Calculations  MV E max micah: 111.0 cm/sec  MV A max micah: 53.1 cm/sec  MV E/A: 2.1  MV dec time: 0.20 sec  PA acc time: 0.11 sec  TR max micah: 317.0 cm/sec  TR max P.6 mmHg  Lateral E/e': 21.9  Medial E/e': 29.4           _____________________________________________________________________________  __           Report approved by: Adams Fallon 2017  12:58 PM      Echocardiogram Limited    Narrative    [id number]  [facility]  [id number]  [id number]^LORENA^SARTHAK^                                                                          Version 2        [facility],[place]  Echocardiography Laboratory  [address] [place] [id number]     Name: MARI HERNANDEZ  MRN: [id number]  : 1940  Study Date: 2017 08:14 AM  Age: 77 yrs  Gender: Female  Patient Location: [facility]  Reason For Study: Heart Failure  Ordering Physician: SARTHAK CARRILLO  Referring Physician: SELF, REFERRED  Performed By: KIAH Ball     BSA: 1.8 m2  Height: 64 in  Weight: 174 lb  BP: 105/85 mmHg  _____________________________________________________________________________  __     _____________________________________________________________________________  __        Interpretation Summary  Limited study.  Global and regional left ventricular function is normal with an EF of 60-65%.  Mild to moderate right ventricular dilation is present.  Global right ventricular function is moderately reduced.  Right ventricular systolic pressure is 51mmHg above the right atrial pressure.  Dilation of the inferior vena cava is present with abnormal respiratory  variation in diameter.  _____________________________________________________________________________  __        Left Ventricle  Limited study. Global and regional left ventricular function is normal with an  EF of 60-65%. Left ventricular wall thickness is normal. Left ventricular size  is normal. Diastolic function not assessed due to severe MAC. No regional wall  motion abnormalities are seen.     Right Ventricle  Mild to moderate right ventricular dilation is present. Global right  ventricular function is moderately reduced.     Atria  Moderate to severe biatrial enlargement is present.     Mitral Valve  Moderate to severe mitral annular calcification is present. Mild mitral  insufficiency is  present.        Aortic Valve  Mild to moderate aortic valve sclerosis is present. Trace aortic insufficiency  is present.     Tricuspid Valve  Mild to moderate tricuspid insufficiency is present. Right ventricular  systolic pressure is 51mmHg above the right atrial pressure.     Vessels  Dilation of the inferior vena cava is present with abnormal respiratory  variation in diameter.     Pericardium  No pericardial effusion is present.     _____________________________________________________________________________  __  MMode/2D Measurements & Calculations  LA Volume (BP): 95.9 ml        Doppler Measurements & Calculations  MV E max micah: 141.5 cm/sec  MV A max micah: 70.8 cm/sec  MV E/A: 2.0  MV dec slope: 818.3 cm/sec2  MV dec time: 0.17 sec  TV max P.0 mmHg     TR max micah: 342.2 cm/sec  TR max P.0 mmHg  Lateral E/e': 15.8  Medial E/e': 22.8           _____________________________________________________________________________  __           Report approved by: Adams Ulloa 2017 09:07 AM      Echocardiogram    Narrative    [id number]  [id number]  [id number]  [id number]^LUIS FERNANDO^EDUARDO^FATOU           [facility]  Echocardiography Laboratory  [address] [place] [address]        Name: MARI HERNANDEZ  MRN: [id number]  : 1940  Study Date: 2017 10:41 AM  Age: 77 yrs  Gender: Female  Patient Location: [facility]  Reason For Study: CHF  History: CHF,HTN,Diabetes,Atrial Fibrillation,Pulmonary HTN,ANABEL  Ordering Physician: EDUARDO GOULD  Performed By: Devi Moody     BSA: 2.0 m2  Height: 64 in  Weight: 219 lb  HR: 63  BP: 114/55 mmHg  _____________________________________________________________________________  __        Procedure  Complete Portable Echo Adult.  _____________________________________________________________________________  __        Interpretation Summary     The rhythm was atrial fibrillation.  Left ventricular systolic function is normal.  The visual  ejection fraction is estimated at 60-65%.  The left ventricle is normal in size.  There is mild concentric left ventricular hypertrophy.  The right ventricle is normal in structure, function and size.  There is moderate biatrial enlargement.  There is mild (1+) mitral regurgitation.  Right ventricular systolic pressure is elevated, consistent with mild  pulmonary hypertension.     Since the last study 7/2/2016, the patient has developed new atrial  fibrillation with a controlled ventricular response rate and there has been a  reduction in estimated PA systolic pressure from 68 mmHg + RAP to 31 mmHg +  RAP.  _____________________________________________________________________________  __        Left Ventricle  The left ventricle is normal in size. There is mild concentric left  ventricular hypertrophy. Left ventricular systolic function is normal. The  visual ejection fraction is estimated at 60-65%. No regional wall motion  abnormalities noted. There is no thrombus seen in the left ventricle.     Right Ventricle  The right ventricle is normal in structure, function and size. There is no  mass or thrombus in the right ventricle.     Atria  There is moderate biatrial enlargement. There is no atrial shunt seen.     Mitral Valve  There is moderate mitral annular calcification. There is mild (1+) mitral  regurgitation. There is no mitral valve stenosis.        Tricuspid Valve  Normal tricuspid valve. The right ventricular systolic pressure is  approximated at 30.6 mmHg plus the right atrial pressure. Right ventricular  systolic pressure is elevated, consistent with mild pulmonary hypertension.  There is no tricuspid stenosis.     Aortic Valve  There is moderate trileaflet aortic sclerosis. There is trace to mild aortic  regurgitation. No aortic stenosis is present.     Pulmonic Valve  Normal pulmonic valve. There is mild (1+) pulmonic valvular regurgitation.  There is no pulmonic valvular stenosis.     Vessels  The  aortic root is normal size. Normal size ascending aorta. The IVC is normal  in size and reactivity with respiration, suggesting normal central venous  pressure. The pulmonary artery is normal size.     Pericardium  The pericardium appears normal. There is no pleural effusion.        Rhythm  The rhythm was atrial fibrillation.  _____________________________________________________________________________  __  MMode/2D Measurements & Calculations     IVSd: 1.1 cm  LVIDd: 3.8 cm  LVIDs: 2.5 cm  LVPWd: 1.2 cm  FS: 34.2 %  EDV(Teich): 62.0 ml  ESV(Teich): 22.3 ml  LV mass(C)d: 146.6 grams  LV mass(C)dI: 72.1 grams/m2  Ao root diam: 2.7 cm  LA dimension: 4.8 cm  asc Aorta Diam: 3.1 cm  LA/Ao: 1.8  LVOT diam: 1.8 cm  LVOT area: 2.5 cm2  LA Volume (BP): 80.0 ml  LA Volume Index (BP): 39.4 ml/m2  TAPSE: 1.3 cm           Doppler Measurements & Calculations  MV E max micah: 122.3 cm/sec  MR ERO: 0.11 cm2  MR volume: 14.1 ml  PA acc time: 0.10 sec  TR max micah: 256.7 cm/sec  TR max P.6 mmHg  Lateral E/e': 13.4  Medial E/e': 17.3           _____________________________________________________________________________  __           Report approved by: Dr. Rajinder Ventura 2017 12:38 PM[TV1.1]                    Revision History        User Key Date/Time User Provider Type Action    > TV1.2 10/8/2017  3:56 PM Denis Mart MD Physician Sign     TV1.1 10/8/2017  2:20 PM Denis Mart MD Physician             Consults by Julia Andino RD, LD at 10/4/2017  5:15 PM     Author:  Julia Andino RD, LD Service:  Nutrition Author Type:  Registered Dietitian    Filed:  10/4/2017  5:15 PM Date of Service:  10/4/2017  5:15 PM Creation Time:  10/4/2017  4:57 PM    Status:  Signed :  Julia Andino RD, LD (Registered Dietitian)         BRIEF NUTRITION ASSESSMENT      REASON FOR ASSESSMENT:  Nutrition Admission Screen - new/uncontrolled diabetes    NUTRITION HISTORY:  Limited nutritional history as  "patient was on bipap this late pm.  Information mostly obtained from [facility] and daughter Jennifer.  Patient has recently been visited by dietitians:  6/14/17 - DB diet education completed  7/10/17 - Renal, heart heathy diet education completed  7/11/17 - K and Phos containing foods reviewed further  8/14/17 - Discussed dialysis diet (higher protein) in light of plan for future HD and pt with wounds.    Pt tells me she followed the renal diet \"so so.\"  She was eating fairly well up until Monday.  She is not familiar with oral liquid nutritional supplements but is willing to try one now.  She thinks she has maintained her nutrition and muscle mass fairly well in light of aging and her health.  Note recent diagnosis of C-diff colitis.    CURRENT DIET AND INTAKE:  Diet:  Dialysis, Moderate Consistent CHO (1452-1560 kcals per day)  This calorie level is appropriate as provides roughly 25-30 kcal/kg adjusted wt.     Today pt only took bites for breakfast.  Appetite is very poor.  However, she was very interested to look at the Dialysis Diet list for room service.    ANTHROPOMETRICS:  Height: 5 ft 4 in  Admit Weight:  80 kg  BMI:  30.3 kg/m2  IBW:  54.5 kg   Weight Status: Overweight BMI 25-29.9  %IBW:  147%  Weight History:  Weight changes due to fluids with recent usual range 165-188 lbs. Pt is currently in the middle of this range.[MH1.1]     Wt Readings from Last 20 Encounters:   10/04/17 80.6 kg (177 lb 11.1 oz)   10/01/17 77.3 kg (170 lb 8 oz)   09/22/17 77.1 kg (170 lb)   09/21/17 76.2 kg (168 lb)   09/19/17 76.5 kg (168 lb 11.2 oz)   09/15/17 76.1 kg (167 lb 11.2 oz)   09/13/17 78.7 kg (173 lb 6.4 oz)   09/11/17 78.4 kg (172 lb 12.8 oz)   09/08/17 75.4 kg (166 lb 4.8 oz)   09/06/17 75 kg (165 lb 6.4 oz)   09/06/17 75.8 kg (167 lb)   08/31/17 73.2 kg (161 lb 6.4 oz)   08/23/17 83.3 kg (183 lb 9.6 oz)   08/22/17 85.3 kg (188 lb 1.6 oz)   08/21/17 83.4 kg (183 lb 12.8 oz)   08/18/17 82 kg (180 lb 12.8 oz)   08/12/17 81.6 " "kg (180 lb)   08/10/17 84.5 kg (186 lb 3.2 oz)   07/17/17 80.9 kg (178 lb 6.4 oz)   07/14/17 80.7 kg (178 lb)[MH1.2]       LABS:  Labs noted  Na 144 (NL)  K 3.2 (L)  BUN 26 (NL)  Cr 3.19 (H) - Note pt started on HD on 10/2    MALNUTRITION:  Patient does not meet two of the following criteria necessary for diagnosing malnutrition: significant weight loss, reduced intake, subcutaneous fat loss, muscle loss or fluid retention  Oral intake has been poor for 3 days.  - Per WOCN on 10/2:  Numerous slough covered wounds on bilateral LE.  Right LE especially looks hot and infected.  Will use Iodosorb Gel on wounds and Mepilex Lite Foam.  Recommend lymphedema to help with wound healing and prevention   - Pt with 2+ mild edema.    NUTRITION INTERVENTION:  Nutrition Diagnosis:  No nutrition diagnosis at this time.    Implementation:  Nutrition Education:  Per Provider order if indicated  Provided education on current diet order and the use of supplements to optimize oral intake.  Provided handout \"Dialysis Diet\" from [employer] to assist with menu selection.  Medical food supplement therapy - Ordered 4 oz Strawberry Nepro with meals for nutrition push.    FOLLOW UP/MONITORING:   Will re-evaluate in 7 - 10 days, or sooner, if re-consulted.    Julia Andino RD, CAROLE, [facility][MH1.1]       Revision History        User Key Date/Time User Provider Type Action    > MH1.2 10/4/2017  5:15 PM Julia Andino RD, LD Registered Dietitian Sign     MH1.1 10/4/2017  4:57 PM Julia Andino RD, LD Registered Dietitian             Consults by Davey Palma MD at 10/2/2017 11:17 AM     Author:  Davey Palma MD Service:  Nephrology Author Type:  Physician    Filed:  10/2/2017 12:05 PM Date of Service:  10/2/2017 11:17 AM Creation Time:  10/2/2017 10:50 AM    Status:  Addendum :  Davey Palma MD (Physician)     Consult Orders:    1. Nephrology IP Consult: Patient to be seen: Routine - within 24 hours; Patient with known ESRD now " septic, acidemic and with hyperkalemia.; Consultant may enter orders: Yes [[id number]] ordered by Rashard Millan MD at 10/02/17 9299                RENAL CONSULTATION NOTE    REFERRING MD: Rashard Millan MD     REASON FOR CONSULTATION:[OP1.1]  CKD V with acidosis and hyperkalemia[OP1.2]    HPI:[OP1.1]  77 y.o woman with CAD s/p CABG x 5, CKD V, hypertension and lymphedema, who was transferred from [facility]. Pt fell 5 days ago. She presented to the ER for the fall. Xrays showed fracture interior scapula and LLL. She was advised inpatient treatment, but she wanted to go home. She was discharged with Levaquin, only to came back with worsening cough. She was then diagnosed with sepsis presumed from PNA. She was started on IV antibiotics, and it looks like she was given two liters of IVF. Then she was transferred her for further management.     Currently, she is on NE at 0.16 mcg. SBP is in the 80s.     Patient looks ill, but she is able to answer some questions appropriately. He daughter is at the bedside.     She states that that she was doing okay the week prior to admission. Though, she was feeling weaker, she had normal appetite. She denies nausea and vomiting. She has some diarrhea.[OP1.2]     ROS:  A complete review of systems was performed and is negative except as noted above.    PMH:    Past Medical History:   Diagnosis Date     Carpal tunnel syndrome      Coronary atherosclerosis of native coronary artery 2006    5 vessel CABG     OBSTRUCTIVE SLEEP APNEA     using CPAP     Osteoarthrosis, unspecified whether generalized or localized, unspecified site     generalized arthritis, particularly in her hands and feet         Other and combined forms of senile cataract 2000    Bilateral     Other and unspecified hyperlipidemia      RESTLESS LEGS SYNDROME      TENOSYNOV HAND/WRIST NEC 6/30/2006     Type II or unspecified type diabetes mellitus without mention of complication, not  stated as uncontrolled 2001    Diabetes mellitus/pt is diet controlled with weight loss     Typical atrial flutter (H) 01/17/2007    ablation 6/7/2017     Unspecified essential hypertension        PSH:    Past Surgical History:   Procedure Laterality Date     ANGIOPLASTY       C APPENDECTOMY       C CABG, VEIN, FIVE  12/06    SVG x 4 and LIMA to LAD     C SOTO W/O FACETEC FORAMOT/DSKC 1/2 VRT SEG, LUMBAR  1968     C REMV CATARACT INTRACAP,INSERT LENS      Bilateral     C TOTAL ABDOM HYSTERECTOMY  1995     - fibroids     C VAGOTOMY/PYLOROPLASTY,SELECT  1970     COLONOSCOPY  12/3/2007     COLONOSCOPY  1/17/2014    Procedure: COLONOSCOPY;  Colonoscopy;  Surgeon: Zack Stearns MD;  Location:  GI     CYSTOSCOPY  11/25/09    [facility]     ENDOSCOPY  03/21/2000    Upper GI     HC COLONOSCOPY THRU STOMA, DIAGNOSTIC  10/7/04    poor prep, repeat in 2-3 years     HC COLONOSCOPY W/WO BRUSH/WASH  10/31/07    Repeat-poor quality prep     HC REMOVAL OF OVARY/TUBE(S)      Salpingo-Oophorectomy, Unilateral     HC REVISE MEDIAN N/CARPAL TUNNEL SURG      Carpal tunnel release     HC UGI ENDOSCOPY DIAG W BIOPSY  10/31/07     HC UGI ENDOSCOPY, SIMPLE EXAM  12/3/2007     OPEN REDUCTION INTERNAL FIXATION HIP NAILING Left 7/3/2016    Procedure: OPEN REDUCTION INTERNAL FIXATION HIP NAILING;  Surgeon: Lake Schulz MD;  Location:  OR       MEDICATIONS:      piperacillin-tazobactam  2.25 g Intravenous Q6H     vancomycin place maki - receiving intermittent dosing  1 each Does not apply See Admin Instructions     vancomycin  250 mg Oral 4x Daily       ALLERGIES:    Allergies as of 10/01/2017 - Review Complete 10/01/2017   Allergen Reaction Noted     Ciprofloxacin Nausea and Vomiting 08/02/2016     Oxycodone Visual Disturbance 08/02/2016     Lisinopril Cough 10/11/2007     Sulfa drugs GI Disturbance 01/30/2001       FH:    Family History   Problem Relation Age of Onset     DIABETES Father      AODM     Neurologic Disorder Father       Parkinson's     Blood Disease Father       from blood clot from leg to lung immediately after hip fracture     DIABETES Paternal Grandmother      adult onset     DIABETES Maternal Grandmother      adult onset     Hypertension No family hx of      CEREBROVASCULAR DISEASE No family hx of        SH:    Social History     Social History     Marital status:      Spouse name: Urbano Banks     Number of children: 2     Years of education: 13     Occupational History     Ministry coordinator      [employer]     Social History Main Topics     Smoking status: Former Smoker     Years: 10.00     Quit date: 1969     Smokeless tobacco: Never Used     Alcohol use 0.0 oz/week     0 Standard drinks or equivalent per week      Comment: 2 a beer once a month     Drug use: No     Sexual activity: Yes     Birth control/ protection: Surgical     Other Topics Concern     Parent/Sibling W/ Cabg, Mi Or Angioplasty Before 65f 55m? No     Social History Narrative       PHYSICAL EXAM:    /77  Temp 92.6  F (33.7  C) (Axillary)  Resp 19  Wt 80 kg (176 lb 5.9 oz)  SpO2 92%  BMI 30.27 kg/m2  GENERAL:[OP1.1] Looks tired and weak.[OP1.2]  HEENT:  Normocephalic. No gross abnormalities.[OP1.1]  Lips are dry.[OP1.2]  CV: RRR,[OP1.1] no[OP1.2] murmurs, no clicks, gallops, or rubs,[OP1.1] 1+[OP1.2]edema[OP1.1] at the thigh[OP1.2]  RESP:[OP1.1] lungs sound junky. BS diminish left lung base.[OP1.2]  GI: Abdomen[OP1.1] obese, soft, NT[OP1.2]  MUSCULOSKELETAL:[OP1.1] Bilateral with ulcers. Legs are wrapped.[OP1.2]  SKIN:[OP1.1] B/L ext leg ulcers[OP1.2]  NEURO:[OP1.1]  Awake and alert.[OP1.2]   LYMPH: No palpable ant/post cervical     LABS:      CBC RESULTS:     Recent Labs  Lab 10/01/17  2145   WBC 6.7   RBC 3.13*   HGB 8.1*   HCT 26.6*          BMP RESULTS:    Recent Labs  Lab 10/02/17  0400 10/01/17  2145   NA  --  145*   POTASSIUM 5.5* 5.6*   CHLORIDE  --  120*   CO2  --  11*   BUN  --   60*   CR  --  5.23*   GLC  --  323*   MALIKC  --  7.0*       INR  Recent Labs  Lab 10/02/17  0400 09/28/17   INR 5.71* 2.2        DIAGNOSTICS:  Reviewed    A/P:[OP1.1]  77 y.o woman with CAD s/p CABG x 5, HTN and CKD V, who is admitted for sepsis presume due to PNA, consulted for worsening kidney function.     1. CKD V. Her nephrologist has started the process for access and plan for renal renal replacement in the near future. Her kidney function is getting worse in the setting of sepsis. She has acidosis from advanced renal failure and mild hyperkalemia. She may need RRT, but currently, there is no urgent indications to start CRRT. Her SBP is in the 80s with NE at 0.16 mcg. ICU team will continue further resuscitation and try to improve SBP. Repeat labs are scheduled.     2. Septic shock presumed due to PNA. She also has UTI.    -NE at 0.15 mcg   -she is getting 250 cc of 5% albumin    -keep MAP ~65-70     3. Acidosis, mostly due to renal failure   -sodium bicarbonate ordered     4. 1+ peripheral edema with some vascular congestion on CXR. Patient may need to be intubated if her respiratory status deteriorate with fluid resuscitation.   -TTE in August showed EF 60-65 with moderate reduction in RV function    5. Mild hyperkalemia.    -continue to monitor for now    6. Elevated INR.    -got vitK   -no evidence of bleeding     Plan.   1. Consider more aggressive resuscitation for sepsis.   2. Continue to follow serum bicarb and pH.   3. Agree with 5% albumin. She may need a couple more doses.  4. 1 amp of sodium bicarbonate stat, then increase bicarb gtt to 100 cc/hr  5. Keep MAP ~ 65-70  6. May need to initiate RRT during this admission, but not urgently today though, unless we can't control her acidosis and hyperkalemia. I suspect pH and K will improve with medical management. She may need to be intubated if her respiratory condition declines.[OP1.2] I discussed her case with the ICU team.[OP1.3]    Davey Palma,  MD [facility] - Nephrology  Office Phone: [phone]  Pager: [phone][OP1.1]     Revision History        User Key Date/Time User Provider Type Action    > [N/A] 10/2/2017 12:05 PM Davey Palma MD Physician Addend     OP1.3 10/2/2017 12:04 PM Davey Palma MD Physician Sign     OP1.2 10/2/2017 11:28 AM Davey Palma MD Physician      OP1.1 10/2/2017 10:50 AM Davey Palma MD Physician                      Progress Notes - Physician (Notes from 10/15/17 through 10/18/17)      Progress Notes by BEATRIZ Galeas MD at 10/18/2017  9:30 AM     Author:  BEATRIZ Galeas MD Service:  Nephrology Author Type:  Physician    Filed:  10/18/2017  9:37 AM Date of Service:  10/18/2017  9:30 AM Creation Time:  10/18/2017  9:30 AM    Status:  Signed :  BEATRIZ Galeas MD (Physician)         Renal Medicine Inpatient Dialysis Note                                Kathryn Banks MRN# [id number]   Age: 77 year old YOB: 1940   Date of Admission: 10/2/2017 Hospital LOS: 16          Assessment/Plan:       Admitted in setting of presumed sepsis.  Course complicated by respiratory failure,  Afib with RVR and encephalopathy    Dialysis initiated 10/02/17    1.  ESRD   -tunneled line   -UF goal 2 to 3 liter  2.  Anemia  3.  Secondary hyperparathyroidism  4.  Afib    Continue MWF schedule    [facility] (arrive 2 pm)        Interval History:     Seen while on run.  Dialysis parameters reviewed with dialysis RN  IJ tunneled line at 400 ml/min.  2 liter UF.  3 K bath.  Requiring heparin    No patient complaints.  Stable run.  Anticipate next run outpatient    ROS     GENERAL: NAD, No fever,chills  R: NEGATIVE for significant cough or SOB  CV: NEGATIVE for chest pain, palpitations  EXT: no change edema  ROS otherwise negative    Dialysis Parameters:     Vitals were reviewed  Patient Vitals for the past 8 hrs:   SpO2 Weight   10/18/17 0807 95 % -   10/18/17 0432 - 62.2 kg (137  lb 2 oz)     I/O last 3 completed shifts:  In: 520 [P.O.:520]  Out: 900 [Urine:500; Other:400]    Vitals:    10/15/17 0700 10/16/17 0634 10/16/17 0815 10/17/17 0500   Weight: 63.9 kg (140 lb 14 oz) 63.5 kg (139 lb 15.9 oz) 63.5 kg (139 lb 15.9 oz) 62.9 kg (138 lb 9.6 oz)    10/18/17 0432   Weight: 62.2 kg (137 lb 2 oz)       Current Weight: 62.2  Dry Weight: 61 range  Dialysis Temp: 36.5  C  Access Device: IJ tunnled  Access Site: right  Dialyzer: Revaclear  Dialysis Bath: 3  Sodium Profile: n  UF Goal: 2  Blood Flow Rate (mL/min): 400  Total Treatment Time (hrs): 3  Heparin: Low dose as required      EPO dose: n  Zemplar: n  IV Fe: n      Medications and Allergies:     Reviewed      Physical Exam:     Seen and examined during course of dialysis run    /49  Pulse 70  Temp 98.1  F (36.7  C) (Oral)  Resp 16  Wt 62.2 kg (137 lb 2 oz)  SpO2 95%  BMI 23.54 kg/m2    GENERAL: awake, alert, follows  HEENT: NC/AT, PERRLA, EOMI, non icteric, pharynx moist without lesion  NECK: supple, no masses or adenopathy  RESP: symmetric excursion, good effort, lungs clear - no rales, rhonchi or wheezes  CV: RRR, normal S1 S2  ABDOMEN: S/NT, BS present  MS: no edema  SKIN: catheter site clean without drainage  NEURO: speech normal and cranial nerves 2-12 intact  PSYCH: affect normal/bright    Data:         Recent Labs  Lab 10/16/17  0545      POTASSIUM 4.3   CHLORIDE 111*   CO2 22   ANIONGAP 10   *   BUN 26   CR 4.51*   GFRESTIMATED 9*   GFRESTBLACK 11*   MALICK 7.4*     Recent Labs   Lab Test  10/16/17   0545  10/15/17   0700  10/14/17   0539  10/12/17   0456  10/11/17   0420  10/10/17   0409  10/09/17   0441  10/08/17   0411  10/07/17   1825  10/07/17   0442   CR  4.51*  3.69*  2.65*  2.79*  3.68*  2.80*  3.98*  3.28*  2.93*  3.51*       Recent Labs  Lab 10/16/17  0545 10/12/17  0456   ALBUMIN 2.2* 2.6*       Recent Labs  Lab 10/16/17  1020 10/16/17  0545 10/15/17  0748 10/15/17  0700 10/14/17  0539 10/12/17  1600  10/12/17  0456   PHOS  --  4.9*  --  3.8  --  3.9  --    HGB 9.6*  --  10.4*  --  8.7*  --  8.1*         BEATRIZ Galeas    [facility] - Nephrology  [phone][GO1.1]             Revision History        User Key Date/Time User Provider Type Action    > GO1.1 10/18/2017  9:37 AM BEATRIZ Galeas MD Physician Sign            Progress Notes by Guadalupe Noriega MD at 10/17/2017 12:30 PM     Author:  Guadalupe Noriega MD Service:  Hospitalist Author Type:  Physician    Filed:  10/17/2017  6:41 PM Date of Service:  10/17/2017 12:30 PM Creation Time:  10/17/2017 12:30 PM    Status:  Signed :  Guadalupe Noriega MD (Physician)         [facility]  Hospitalist Progress Note  Date of service (when I saw the patient)10/17/2017:         Assessment and Plan:   Ms Kathryn Banks  is a 77-year-old female with a history of diastolic congestive heart failure, EF 60% to 65%, type 2 non-insulin dependent diabetes mellitus, hemoglobin A1c of 7.6, paroxysmal atrial fibrillation, coronary artery disease, status post 5-vessel coronary artery bypass graft in the remote past who was admitted on 10/2/2017 from an outside hospital for evaluation and management of sepsis likely secondary to pneumonia. Upon arrival she was requiring vasopressor support, which has been weaned off. Intermittent hemodialysis was initiated on 10/2/2017 out of concern for acute on chronic renal failure, hyperkalemia, and metabolic acidosis. Her oxygenation has improved and she is currently on HF nasal cannula. Chest CT on 10/10/2017 noted continued bilateral pleural effusions, left greater than right.     Transfered out of ICU on 10/12.      Acute hypoxic respiratory failure:  - Secondary to suspected pneumonia and volume overload issues related to decompensated diastolic congestive heart failure, Rapid A. fib. Requiring hemodialysis and had thoracentesis for 2 liters. Completed a course of meropenem  and vancomycin for possible sepsis with procalcitonin coming down (1.70 on 10/12).   - on room air  - Continue BiPAP at night for ANABEL.       - continue with hemodialysis per Nephrology.        Sepsis secondary to pneumonia:   - although her urinalysis was positive on 10/01 but this appears to have been adequately treated.   - Abx course completed as above. Also required a course of vancomycin for C. diff, but last stool on 10/02 was negative.    - BC 10/14 NTD  - remains on enteric isolation as she is still having some diarrhea.         Paroxysmal Atrial fibrillation with rapid ventricular rate:   - s/p ablation & cardioversion in 6/2017  - rate better   - cardiology following started po amiodarone 10/14, repeat limited TTE 10/16 for evaluation of tachycardiomyopathy  - Given PRBC x 1 10/14 for hgb 8.7, cards request to have Hgb>10 for procedure 10/15  - Hgb on 10/1510.4, repeat 9.6 on 10/16  - BP improved 10/14 with 500ml fluid bolus & PRBC  on 10/14   - Bld cx 10/14 NTD  - EP consult appreciated, recommended medical mgmt with increased dose of Amiodarone. No plan for AVN ablation to avoid risk of infection in the face of recent sepsis/hemodialysis.  - cont chronically anticoagulated on Coumadin with Rph dosing, INR subtherapeutic 1.51 today.    - tolerating Lopressor 25mg po BID      Acute encephalopathy secondary to ICU delirium, sepsis: Improved.  - Conservative management of pain meds and benzodiazepines  - minimize use of benzodiazepines  - consider antipsychotics if acutely agitated (seroquel, zyprexa)        History of diabetes mellitus type 2:   - not on insulin at home, recent hemoglobin A1c was 7.3 in June.  Recheck is 6.6 on 10/13. She was transitioned off an insulin drip.   - on Lantus, increased to 8 units every morning 10/14  - Medium SSI   - continue mod cho      DVT Prophylaxis: Warfarin and Pneumatic Compression Devices  Code Status: Full Code      Disposition: waiting TCU placement     Guadalupe  Hari TAVAREZ.  Hospitalist [phone] (7am -6 pm)                  Interval History:[PK1.1]   C/o sig itching in perianal, vaginal area.[PK1.2]              Medications:       warfarin  4 mg Oral ONCE at 18:00     amiodarone  400 mg Oral BID     metoprolol  25 mg Oral BID     insulin glargine  8 Units Subcutaneous QAM AC     pramipexole  0.125 mg Oral TID     insulin aspart  1-7 Units Subcutaneous TID AC     insulin aspart  1-5 Units Subcutaneous At Bedtime     pantoprazole  40 mg Oral QAM     ipratropium - albuterol 0.5 mg/2.5 mg/3 mL  1 vial Nebulization TID     pentoxifylline  400 mg Oral Daily     sodium chloride (PF)  10 mL Intracatheter Q8H     cholecalciferol  5,000 Units Oral Daily     - MEDICATION INSTRUCTIONS for Dialysis Patients -   Does not apply See Admin Instructions     miconazole, potassium chloride, potassium chloride, magnesium sulfate, potassium phosphate (KPHOS) in D5W IV, potassium phosphate (KPHOS) in D5W IV, potassium phosphate (KPHOS) in D5W IV, potassium phosphate (KPHOS) in D5W IV, Warfarin Therapy Reminder, clonazePAM, metoprolol, hydrALAZINE, lidocaine 4%, sodium chloride (PF), HYDROmorphone, acetaminophen, naloxone, albuterol, IV fluid REPLACEMENT ONLY, glucose **OR** dextrose **OR** glucagon, lidocaine               Physical Exam:   Blood pressure 111/61, pulse 73, temperature 98.5  F (36.9  C), temperature source Oral, resp. rate 16, weight 62.9 kg (138 lb 9.6 oz), SpO2 95 %, not currently breastfeeding.  Wt Readings from Last 4 Encounters:   10/17/17 62.9 kg (138 lb 9.6 oz)   10/01/17 77.3 kg (170 lb 8 oz)   17 77.1 kg (170 lb)   17 76.2 kg (168 lb)         Vital Sign Ranges  Temperature Temp  Av.9  F (37.2  C)  Min: 98.5  F (36.9  C)  Max: 99.3  F (37.4  C)   Blood pressure Systolic (24hrs), Av , Min:95 , Max:118        Diastolic (24hrs), Av, Min:56, Max:69      Pulse Pulse  Av  Min: 73  Max: 73   Respirations Resp  Av  Min: 16  Max: 16   Pulse  oximetry SpO2  Av %  Min: 92 %  Max: 96 %         Intake/Output Summary (Last 24 hours) at 10/17/17 1230  Last data filed at 10/17/17 1030   Gross per 24 hour   Intake              930 ml   Output              700 ml   Net              230 ml       Constitutional: Awake, alert, cooperative, no apparent distress   Lungs: Clear to auscultation bilaterally, no crackles or wheezing   Cardiovascular: Regular rate and rhythm, normal S1 and S2, and no murmur noted   Abdomen: Normal bowel sounds, soft, non-distended, non-tender   Skin: No rashes, no cyanosis, no edema   Neuro:                Data:[PK1.1]   All laboratory data reviewed[PK1.2]       Revision History        User Key Date/Time User Provider Type Action    > PK1.2 10/17/2017  6:41 PM Guadalupe Noriega MD Physician Sign     PK1.1 10/17/2017 12:30 PM Guadalupe Noriega MD Physician             Progress Notes by Rachel Verdugo RN at 10/17/2017 12:45 PM     Author:  Rachel Verdugo RN Service:  Care Coordinator Author Type:      Filed:  10/17/2017  3:43 PM Date of Service:  10/17/2017 12:45 PM Creation Time:  10/17/2017  3:36 PM    Status:  Addendum :  Rachel Verdugo RN ()         Met w/ pt re: transportation to TCU and to and from dialysis. Discussed transportation options with pt. Pt understands that transportation would be private pay. Pt initially did not want to go to [facility] r/t the amenity fee. She then stated [facility] may still be an option since it would be cheaper to pay the fee and have her  bring her to dialysis vs paying for a taxi or medicab from [facility]SM1.1[facility][SM1.2]reed in [place] to dialysis in [facility].[SM1.1]  Also discussed the use of CPAP while here in the hospital. The pt states she has a CPAP at home. Requested that she have her  bring her CPAP to TCU when discharged.[SM1.2]      Revision History        User Key Date/Time User  Provider Type Action    > SM1.2 10/17/2017  3:43 PM Rachel Verdugo RN Case Manager Addend     SM1.1 10/17/2017  3:39 PM Rachel Verdugo RN Case Manager Sign            Progress Notes by Rachel Verdugo RN at 10/17/2017  3:00 PM     Author:  Rachel Verdugo RN Service:  Care Coordinator Author Type:      Filed:  10/17/2017  3:41 PM Date of Service:  10/17/2017  3:00 PM Creation Time:  10/17/2017  3:39 PM    Status:  Signed :  Rachel Verdugo RN ()         1500 Received call from Ayleen at [facility] stating they have a chair time for the SLP location of 2:30 p and that the patient should arrive at 2:00 PM. Let Ayleen know there was a message left for Benja to check locations closer to [place]. She stated the closes facility is at [facility] and she would send the referral there as well. Will await call back from Ayleen.[SM1.1]      Revision History        User Key Date/Time User Provider Type Action    > SM1.1 10/17/2017  3:41 PM Rachel Verdugo RN Case Manager Sign            Progress Notes by Tamica Alexander RN at 10/17/2017  2:37 PM     Author:  Tamica Alexander RN Service:  [facility] Nurse Author Type:  Registered Nurse    Filed:  10/17/2017  2:46 PM Date of Service:  10/17/2017  2:37 PM Creation Time:  10/17/2017  2:37 PM    Status:  Signed :  Tamica Alexander RN (Registered Nurse)         [facility] follow-up on BLE wounds:    D:  All previous scattered small open sloughy wounds on lower legs are now scabbed over with dry dark yellow-red scaly scabs, some starting to lift at edges, and legs are open to air.  Legs are dry and flaky in general but no real erythema or noticeable edema.  Feet in very good condition.      I:  Legs cleansed with Mayur lotion and Sween 24 cream applied.  Legs left open to air.  Reviewed supplies and POC with pt.     A/P:  Wounds appear slowly resolving, no s/s infection, would just keep legs clean and moisturized and open to air and allow  scabs to lift off when ready.  If any areas do re-open, pt can go back to Iodosorb gel to those areas.  Pt may want to consider compression therapy in future if wounds reoccur or do not fully heal.      Plan for BLE:  Daily:  1.  Cleanse legs and feet with Mayur lotion and dry wipes, no need to rinse.   2.  Apply Sween 24 cream to legs and feet, avoiding between toes.   3.  Leave legs open to air, unless any areas re-open - then cleanse open wounds with MicroKlenz and apply Iodosorb gel, cover with gauze or foam, change daily.    4.  Elevate legs whenever possible.     WOC will return:  Weekly and prn  Face to face: 15 min[AC1.1]     Revision History        User Key Date/Time User Provider Type Action    > AC1.1 10/17/2017  2:46 PM Tamica Alexander RN Registered Nurse Sign            Progress Notes by Rachel Verdugo RN at 10/17/2017  2:34 PM     Author:  Rachel Verdugo RN Service:  Care Coordinator Author Type:      Filed:  10/17/2017  2:36 PM Date of Service:  10/17/2017  2:34 PM Creation Time:  10/17/2017  2:34 PM    Status:  Signed :  Rachel Verdugo RN ()         Left message for Benja at ext [phone] at [facility] dialysis at [phone] to check for chair times at facilities closer to [facility] and [place] due to transportation issues.[SM1.1]      Revision History        User Key Date/Time User Provider Type Action    > SM1.1 10/17/2017  2:36 PM Rachel Verdugo RN Case Manager Sign            Progress Notes by Audra Morrison APRN CNP at 10/17/2017 10:08 AM     Author:  Audra Morrison APRN CNP Service:  Electrophysiology Author Type:  Nurse Practitioner    Filed:  10/17/2017 10:32 AM Date of Service:  10/17/2017 10:08 AM Creation Time:  10/17/2017 10:08 AM    Status:  Attested :  Audra Morrison APRN CNP (Nurse Practitioner)    Cosigner:  Gurjit Campos MD at 10/17/2017 12:57 PM        Attestation signed  by Gurjit Campos MD at 10/17/2017 12:57 PM        Physician Attestation   I, Gurjit Campos MD, saw and evaluated Kathryn Banks as part of a shared visit.  I have reviewed and discussed with the advanced practice provider their history, physical and plan.    I personally reviewed the vital signs, medications, labs and imaging.  INR is still subtherapeutic.     My key history or physical exam findings: in SR currently.  Overall, gradually more SR and less AF in the past 24-48 hrs.  Lungs are clear.  LE skin is discolored.    Plan:  - agree with tapering po amiodarone schedule  - metoprolol  - warfarin  - will sign off; plan for f/up with Cardiology at the [facility], as in Audra's note    Gurjit Campos MD    Date of Service (when I saw the patient): 10/17/17                               Electrophysiology Progress Note          Assessment and Plan:[KJ1.1]   Kathryn Banks is a 77-year-old female with a history of[KJ1.2] HFpEF[KJ1.3], EF 60% to 65%, type 2 non-insulin dependent diabetes mellitus, paroxysmal atrial fibrillation and flutter (post CTI ablation by Dr. King June 2017), coronary artery disease (status post 5-vessel coronary artery bypass graft in the remote past) who was admitted on 10/2/2017 from an outside hospital for evaluation and management of sepsis likely secondary to pneumonia.  Intermittent hemodialysis was initiated on 10/2/2017 for renal failure, hyperkalemia, and metabolic acidosis. Has undergone thoracentesis this visit.[KJ1.2]  Developed afib with RVR complicated by hypotension.[KJ1.4]      ASSESSMENT:  Afib has been difficult to control and BP had been soft limiting rate controlling agents so amiodarone was started[KJ1.2] IV on 10/12 (IV for 24 hours then was transitioned to 200 mg once daily)[KJ1.5] and increased yesterday[KJ1.2] to 400 mg BID[KJ1.3] due to persistent elevated heart rates.   Today, she is going in and out of SR and AF with CVR[KJ1.2] (currently in SR  with HR 75)[KJ1.6].  Asymptomatic.[KJ1.2] On warfarin but INR is only 1.5.[KJ1.7] Currently off all antibiotics.  Will continue dialysis as an outpatient.     [KJ1.2]  Echo this visit (10/3) showed[KJ1.8] an LVEF of 60-65%, normal left ventricular wall motion, RV mild to moderately dilated and systolic function mild to moderately reduced.  The right ventricular systolic pressure approximated at 42mmHg plus right atrial pressure.  Normal IVC (1.5-2.5cm) with <50% respiratory collapse; right atrial pressure estimated at 10-15mmHg.  Moderate (46-55mmHg) pulmonary hypertension.[KJ1.9]    PLAN:  -Continue amiodarone to 400 mg BID[KJ1.2] for today[KJ1.3]  -Follow up with [facility] team at the  of  as she had been following with them in the past[KJ1.10]                 Interval History:[KJ1.1]   Feeling well today, no complaints[KJ1.3]              Review of Systems:   As per subjective, otherwise 5 systems reviewed and negative.           Physical Exam:   Blood pressure 109/56, pulse 123, temperature 98.6  F (37  C), temperature source Oral, resp. rate 16, weight 62.9 kg (138 lb 9.6 oz), SpO2 94 %, not currently breastfeeding.      Vital Sign Ranges  Temperature Temp  Av.8  F (37.1  C)  Min: 98.6  F (37  C)  Max: 99.3  F (37.4  C)   Blood pressure Systolic (24hrs), Av , Min:95 , Max:120        Diastolic (24hrs), Av, Min:56, Max:82      Pulse Pulse  Av.5  Min: 122  Max: 138   Respirations Resp  Avg: 15.7  Min: 14  Max: 16   Pulse oximetry SpO2  Av %  Min: 92 %  Max: 96 %         Intake/Output Summary (Last 24 hours) at 10/17/17 1008  Last data filed at 10/17/17 0400   Gross per 24 hour   Intake              730 ml   Output             1800 ml   Net            -1070 ml       Wt Readings from Last 2 Encounters:   10/17/17 62.9 kg (138 lb 9.6 oz)   10/01/17 77.3 kg (170 lb 8 oz)      Constitutional:   NAD   Skin:   Warm and dry   Head:   Nontraumatic   Lungs:   Diminished but CTAB    Cardiovascular:   S1, S2, RRR, np M/R/G   Neurological:   Grossly nonfocal            Medications:   Reviewed             Data:     Results for orders placed or performed during the hospital encounter of 10/02/17 (from the past 24 hour(s))   Hemoglobin   Result Value Ref Range    Hemoglobin 9.6 (L) 11.7 - 15.7 g/dL   Glucose by meter   Result Value Ref Range    Glucose 127 (H) 70 - 99 mg/dL   Glucose by meter   Result Value Ref Range    Glucose 79 70 - 99 mg/dL   Glucose by meter   Result Value Ref Range    Glucose 104 (H) 70 - 99 mg/dL   Glucose by meter   Result Value Ref Range    Glucose 64 (L) 70 - 99 mg/dL   Glucose by meter   Result Value Ref Range    Glucose 92 70 - 99 mg/dL   INR   Result Value Ref Range    INR 1.51 (H) 0.86 - 1.14   Glucose by meter   Result Value Ref Range    Glucose 73 70 - 99 mg/dL     *Note: Due to a large number of results and/or encounters for the requested time period, some results have not been displayed. A complete set of results can be found in Results Review.[KJ1.1]              Revision History        User Key Date/Time User Provider Type Action    > KJ1.6 10/17/2017 10:32 AM Audra Morrison, RHODA CNP Nurse Practitioner Sign     KJ1.5 10/17/2017 10:29 AM Audra Morrison, APRN CNP Nurse Practitioner      KJ1.7 10/17/2017 10:27 AM Audra Morrison, RHODA CNP Nurse Practitioner      KJ1.3 10/17/2017 10:25 AM Audra Morrison APRN CNP Nurse Practitioner      KJ1.9 10/17/2017 10:23 AM Audra Morrison, APRN CNP Nurse Practitioner      KJ1.8 10/17/2017 10:22 AM Audra Morrison, APRN CNP Nurse Practitioner      KJ1.10 10/17/2017 10:20 AM Audra Morrison, APRN CNP Nurse Practitioner      KJ1.4 10/17/2017 10:14 AM Audra Morrison, RHODA CNP Nurse Practitioner      KJ1.2 10/17/2017 10:11 AM Audra Morrison, APRN CNP Nurse Practitioner      KJ1.1 10/17/2017 10:08 AM Audra Morrison APRN CNP  Nurse Practitioner             Progress Notes by BEATRIZ Galeas MD at 10/17/2017 10:33 AM     Author:  BEATRIZ Galeas MD Service:  Nephrology Author Type:  Physician    Filed:  10/17/2017 10:36 AM Date of Service:  10/17/2017 10:33 AM Creation Time:  10/17/2017 10:33 AM    Status:  Signed :  BEATRIZ Galeas MD (Physician)         Renal Medicine       Dialyzed yesterday without event  She offers no complaints today    Arrangements underway for outpatient dialysis at [facility]  (plan initial run there 10/19/17)    TCU placement pending    Plan am dialysis here  Dialysis orders entered    Discussed plan with patient[GO1.1]          Recent Labs  Lab 10/16/17  0545      POTASSIUM 4.3   CHLORIDE 111*   CO2 22   ANIONGAP 10   *   BUN 26   CR 4.51*   GFRESTIMATED 9*   GFRESTBLACK 11*   MALICK 7.4*[GO1.2]           EMILIA Galeas    [facility]  [phone][GO1.1]         Revision History        User Key Date/Time User Provider Type Action    > GO1.2 10/17/2017 10:36 AM BEATRIZ Galeas MD Physician Sign     GO1.1 10/17/2017 10:33 AM BEATRIZ Galeas MD Physician             Progress Notes by Rachel Verdugo RN at 10/16/2017  2:31 PM     Author:  Rachel Verdugo RN Service:  Care Coordinator Author Type:      Filed:  10/17/2017  8:34 AM Date of Service:  10/16/2017  2:31 PM Creation Time:  10/16/2017  2:31 PM    Status:  Addendum :  Rachel Verdugo, RN ()         Received call from Benja from [facility]. There are no chair times at the [place] location but there is a chair time at the [place] location at [address] [place] [address] in [place], [place]. The patient would have dialysis MWF in th[SM1.1]e[SM1.2] afternoon or evening. No exact time given.[SM1.1]      Revision History        User Key Date/Time User Provider Type Action    > SM1.2 10/17/2017  8:34 AM Rachel Verdugo RN Case Manager Addend     SM1.1 10/16/2017  2:34 PM  Rachel Verdugo, RN Case Manager Sign            Progress Notes by Audra Morrison APRN CNP at 10/16/2017 10:06 AM     Author:  Audra Morrison APRN CNP Service:  Electrophysiology Author Type:  Nurse Practitioner    Filed:  10/16/2017 10:49 AM Date of Service:  10/16/2017 10:06 AM Creation Time:  10/16/2017 10:06 AM    Status:  Attested :  Audra Morrison APRN CNP (Nurse Practitioner)    Cosigner:  Quirino Rossi MD at 10/16/2017  4:43 PM        Attestation signed by Quirino Rossi MD at 10/16/2017  4:43 PM        ADDENDUM     Patient seen and examined.  Case discussed with PA/NP.  Agree with plan above.    In summary, 76yo F with Hx of DM, PAF/ Flutter (post CTI ablation June 2017), CAD (previous CABG), who p/w sepsis/ PNA.  Hospitals stay was complicated by ARF (on HD) and Afib with RVR.    Echo: EF 60-65%; mod L-pleural effusion.   Plan:    1. Afib  -Increase amiodarone to 400 mg BID. Continue metoprolol  Attempt to medically manage as AVNA and device implantation at this point carries increased risk of infection in the setting of recent sepsis, HD and underlying DMII.  2. Embolic prevention.  CHADS-Vasc of 5.  Continue coumadin.        Physical Exam:  Vitals: /69 (BP Location: Left arm)  Pulse 123  Temp 99.3  F (37.4  C) (Oral)  Resp 16  Wt 63.5 kg (139 lb 15.9 oz)  SpO2 92%  BMI 24.03 kg/m2      Intake/Output Summary (Last 24 hours) at 10/16/17 1636  Last data filed at 10/16/17 1310   Gross per 24 hour   Intake              240 ml   Output             1715 ml   Net            -1475 ml       Constitutional:  AAO x3.  Pt is in NAD.  HEAD: Normocephalic.  SKIN: Skin normal color, texture and turgor with no lesions or eruptions.  Neck:  Supple, normal JVP, no carotid bruits.  Eyes: PERRL, EOMI.  Chest:  Decrease breath sound in base B/L.   Cardiac:  IIR, variable sound of S1 and Normal S2.   No murmurs rubs or gallop.  Abdomen:  Normal BS.  Soft,  non-tender and non-distended.  No rebound or guarding.    Extremities:  Pedious pulses palpable B/L.  Trace LE edema noticed.   Neurological: Strength and sensation symmetric and grossly intact throughout.          Quirino Rossi MD                                     Electrophysiology Progress Note          Assessment and Plan:[KJ1.1]   Kathryn Banks is a 77-year-old female with a history of diastolic congestive heart failure, EF 60% to 65%, type 2 non-insulin dependent diabetes mellitus, paroxysmal atrial fibrillation and flutter (post[KJ1.2] CTI[KJ1.3] ablatio[KJ1.2]n by Dr. King June[KJ1.3] 2017), coronary artery disease (status post 5-vessel coronary artery bypass graft in the remote past) who was admitted on 10/2/2017 from an outside hospital for evaluation and management of sepsis likely secondary to pneumonia.  Intermittent hemodialysis was initiated on 10/2/2017[KJ1.2] for[KJ1.4] renal failure, hyperkalemia, and metabolic acidosis.[KJ1.2] H[KJ1.5]as undergone thoracentesis this visit.      ASSESSMENT:  Tele shows afib[KJ1.6] and atrial[KJ1.7] flutter with RVR ([KJ1.6]-130's[KJ1.7] at rest).[KJ1.6]  BP had been soft limiting rate controlling agents but is improving[KJ1.5] at 117/72.[KJ1.6]  Tolerating lopressor 25 mg BID and amiodarone 200 mg daily.[KJ1.5]  INR sub therapeutic at 1.34.[KJ1.8]  Notes no symptoms of CP, palpitation or SOB.[KJ1.7]  Currently off all antibiotics.      PLAN:  -Increase amiodarone to 400 mg BID.  Attempt to medically manage as AVNA and device implantation at this point carries increased risk of infection in the setting of recent sepsis, HD and underlying DMII.  -[KJ1.6]Re-assess BP after dialysis run today and see if BB can be uptitrated[KJ1.9]               Interval History:[KJ1.1]   Getting bed side dialysis, denies CP, palpitations and SOB, was up to the chair earlier in the AM[KJ1.9]              Review of Systems:   As per subjective, otherwise 5 systems reviewed and  negative.           Physical Exam:   Blood pressure 117/72, pulse 128, temperature 98.4  F (36.9  C), temperature source Oral, resp. rate 14, weight 63.5 kg (139 lb 15.9 oz), SpO2 95 %, not currently breastfeeding.      Vital Sign Ranges  Temperature Temp  Av.9  F (37.2  C)  Min: 98.4  F (36.9  C)  Max: 99.5  F (37.5  C)   Blood pressure Systolic (24hrs), Av , Min:92 , Max:131        Diastolic (24hrs), Av, Min:45, Max:72      Pulse Pulse  Av.5  Min: 80  Max: 142   Respirations Resp  Avg: 15.5  Min: 14  Max: 16   Pulse oximetry SpO2  Av %  Min: 95 %  Max: 100 %         Intake/Output Summary (Last 24 hours) at 10/16/17 1006  Last data filed at 10/16/17 0633   Gross per 24 hour   Intake              240 ml   Output              215 ml   Net               25 ml       Wt Readings from Last 2 Encounters:   10/16/17 63.5 kg (139 lb 15.9 oz)   10/01/17 77.3 kg (170 lb 8 oz)      Constitutional:   NAD   Skin:   Warm and dry   Head:   Nontraumatic   Lungs:[KJ1.1]   Diminished but clear to anterior auscultation[KJ1.7]   Cardiovascular:[KJ1.1]   S1, S2, irregularly, irregular, fast[KJ1.7]   Neurological:   Grossly nonfocal            Medications:   Reviewed             Data:     Results for orders placed or performed during the hospital encounter of 10/02/17 (from the past 24 hour(s))   Glucose by meter   Result Value Ref Range    Glucose 141 (H) 70 - 99 mg/dL   EKG 12-lead, tracing only   Result Value Ref Range    Interpretation ECG Click View Image link to view waveform and result    Glucose by meter   Result Value Ref Range    Glucose 133 (H) 70 - 99 mg/dL   Glucose by meter   Result Value Ref Range    Glucose 272 (H) 70 - 99 mg/dL   Glucose by meter   Result Value Ref Range    Glucose 105 (H) 70 - 99 mg/dL   INR   Result Value Ref Range    INR 1.54 (H) 0.86 - 1.14   Renal panel   Result Value Ref Range    Sodium 143 133 - 144 mmol/L    Potassium 4.3 3.4 - 5.3 mmol/L    Chloride 111 (H) 94  109  mmol/L    Carbon Dioxide 22 20 - 32 mmol/L    Anion Gap 10 3 - 14 mmol/L    Glucose 102 (H) 70 - 99 mg/dL    Urea Nitrogen 26 7 - 30 mg/dL    Creatinine 4.51 (H) 0.52 - 1.04 mg/dL    GFR Estimate 9 (L) >60 mL/min/1.7m2    GFR Estimate If Black 11 (L) >60 mL/min/1.7m2    Calcium 7.4 (L) 8.5 - 10.1 mg/dL    Phosphorus 4.9 (H) 2.5 - 4.5 mg/dL    Albumin 2.2 (L) 3.4 - 5.0 g/dL   Glucose by meter   Result Value Ref Range    Glucose 112 (H) 70 - 99 mg/dL     *Note: Due to a large number of results and/or encounters for the requested time period, some results have not been displayed. A complete set of results can be found in Results Review.[KJ1.1]              Revision History        User Key Date/Time User Provider Type Action    > [N/A] 10/16/2017 10:49 AM Audra Morrison APRN CNP Nurse Practitioner Sign     KJ1.4 10/16/2017 10:49 AM EstellecAudra, APRN CNP Nurse Practitioner Sign     KJ1.5 10/16/2017 10:47 AM Audra Morrison, APRN CNP Nurse Practitioner      KJ1.7 10/16/2017 10:45 AM Audra Morrison, APRN CNP Nurse Practitioner      KJ1.9 10/16/2017 10:41 AM Audra Morrison, APRN CNP Nurse Practitioner      KJ1.3 10/16/2017 10:25 AM EstellecAudra, APRN CNP Nurse Practitioner      KJ1.8 10/16/2017 10:22 AM Audra Morrison, APRN CNP Nurse Practitioner      KJ1.6 10/16/2017 10:18 AM Audra Morrison, APRN CNP Nurse Practitioner      KJ1.2 10/16/2017 10:13 AM Audra Morrison, APRN CNP Nurse Practitioner      KJ1.1 10/16/2017 10:06 AM Audra Morrison, APRN CNP Nurse Practitioner             Progress Notes by Rachel Hernandez LICSW at 10/16/2017  3:57 PM     Author:  Rachel Hernandez LICSW Service:  Social Work Author Type:      Filed:  10/16/2017  4:07 PM Date of Service:  10/16/2017  3:57 PM Creation Time:  10/16/2017  3:57 PM    Status:  Addendum :  Rachel Hernandez LICSW ()          SW:  D:  Received a call back from [facility].  They can accept patient tomorrow.  They only have private rooms available with an amenity fee of $14 per day.[SJ1.1]   Per Kyle, patient has 24 pr 25 skilled days left in this benefit period.[SJ1.2]  [facility]  [facility].  [facility] [place].  Call placed to check bed availability.  Awaiting a return call.    P:  Will continue to follow.[SJ1.1]     Revision History        User Key Date/Time User Provider Type Action    > SJ1.2 10/16/2017  4:07 PM Rachel Hernandez LICSW  Addend     SJ1.1 10/16/2017  4:02 PM Rachel Hernandez LICSW  Sign            Progress Notes by Milana Crowe RN at 10/16/2017  9:11 AM     Author:  Milana Crowe RN Service:  (none) Author Type:  Registered Nurse    Filed:  10/16/2017  2:56 PM Date of Service:  10/16/2017  9:11 AM Creation Time:  10/16/2017  9:11 AM    Status:  Signed :  Milana Crowe RN (Registered Nurse)         Potassium   Date Value Ref Range Status   10/16/2017 4.3 3.4 - 5.3 mmol/L Final   ]  Lab Results   Component Value Date    HGB 10.4 10/15/2017     Results for MARI HERNANDEZ (MRN [id number]) as of 10/16/2017 09:09   Ref. Range 10/6/2017 04:21   Hep B Surface Agn Latest Ref Range: NR^Nonreactive  Nonreactive   Hepatitis B Surface Antibody Latest Ref Range: <8.00 m[IU]/mL 42.50 (H)     Weight: 63.5 kg (139 lb 15.9 oz)    POST WT 62 kg  EDW  TBD kg    DIALYSIS PROCEDURE NOTE    Patient dialyzed for 3 hrs on a 4 K bath with a net fluid removal of 1.5 L.  A BFR of 400 ml/min was obtained via RIJ.  Patient was seen by  during treatment.  Total heparin received during treatment:  2000 units.  Meds given: None. Complications: None.      Procedure teaching done, questions answered.  Consent verified yes  See flowsheet in EPIC for further details and post assessment.    Machine  water alarm in place and functioning.  Chlorine and chloramines checked on water system every 4 hours.  All safety checks done, air detector on, venous and arterial parameters set. Transducer protectors checked every 15 min. Personally verified previous hepatitis result from last patient run on machine.    Pt dialyzed bedside in her room.    Outpatient Dialysis[KK1.1] TBD.[KK1.2]    Milana Crowe RN  [facility][KK1.1]     Revision History        User Key Date/Time User Provider Type Action    > KK1.2 10/16/2017  2:56 PM Milana Crowe RN Registered Nurse Sign     KK1.1 10/16/2017  9:11 AM Milana Crowe RN Registered Nurse             Progress Notes by Rachel Verdugo RN at 10/16/2017 12:55 PM     Author:  Rachel Verdugo RN Service:  Care Coordinator Author Type:      Filed:  10/16/2017  2:31 PM Date of Service:  10/16/2017 12:55 PM Creation Time:  10/16/2017  2:30 PM    Status:  Signed :  Rachel Verdugo RN ()         Faxed Intake form to to [facility].[SM1.1]      Revision History        User Key Date/Time User Provider Type Action    > SM1.1 10/16/2017  2:31 PM Rachel Verdugo RN Case Manager Sign            Progress Notes by Lesa Keller RT at 10/16/2017  1:49 PM     Author:  Lesa Keller RT Service:  Respiratory Therapy Author Type:  Respiratory Therapist    Filed:  10/16/2017  1:50 PM Date of Service:  10/16/2017  1:49 PM Creation Time:  10/16/2017  1:49 PM    Status:  Signed :  Lesa Keller RT (Respiratory Therapist)         Pt is on room air with Sp02 of 96%. Lung sounds: clear/diminished. Neb tx given as schedule. Will continue to follow.  10/16/2017  Lesa Keller[SM1.1]     Revision History        User Key Date/Time User Provider Type Action    > SM1.1 10/16/2017  1:50 PM Lesa Keller RT Respiratory Therapist Sign            Progress Notes by Megan Rees RD, LD at 10/16/2017 12:16 PM     Author:  Megan Rees RD, LD Service:   "Nutrition Author Type:  Registered Dietitian    Filed:  10/16/2017 12:23 PM Date of Service:  10/16/2017 12:16 PM Creation Time:  10/16/2017 12:16 PM    Status:  Signed :  Megan Rees RD, LD (Registered Dietitian)         CLINICAL NUTRITION SERVICES - REASSESSMENT NOTE      EVALUATION OF PROGRESS TOWARD GOALS   Diet:  Dialysis, Moderate CHO            Room Service with Assist    Chart reviewed  Pt currently receiving cares  Per NA, pt tolerating po  Currently eating lunch - sandwich, fruit, yogurt (waiting for hot cup of soup)  Flowsheets reflect intake of % meals over the past few days      NEW FINDINGS:   Dialysis today    EP consult ---> \"Attempt to medically manage as AVNA and device implantation at this point carries increased risk of infection in the setting of recent sepsis, HD and underlying DMII.\"    TCU at dc      Previous Goals (10/10):   Patient will continue to consume % of meals   Evaluation: Not met consistently    Previous Nutrition Diagnosis (10/10):   Inadequate oral intake related to improving appetite and intake as evidenced by gradually improvement in intake over the last week   Evaluation: Improving        CURRENT NUTRITION DIAGNOSIS  No nutrition diagnosis identified at this time       INTERVENTIONS  Recommendations / Nutrition Prescription  Dialysis, Moderate CHO diet    Implementation  No interventions at this time    Goals  Pt to consume 75% meals TID      MONITORING AND EVALUATION:  Progress towards goals will be monitored and evaluated per protocol and Practice Guidelines[SJ1.1]           Revision History        User Key Date/Time User Provider Type Action    > SJ1.1 10/16/2017 12:23 PM Megan Rees RD, [place] Registered Dietitian Sign            Progress Notes by BEATRIZ Galeas MD at 10/16/2017 10:55 AM     Author:  BEATRIZ Galeas MD Service:  Nephrology Author Type:  Physician    Filed:  10/16/2017 11:04 AM Date of Service:  10/16/2017 10:55 AM " Creation Time:  10/16/2017 10:55 AM    Status:  Signed :  BEATRIZ Galeas MD (Physician)         Renal Medicine Inpatient Dialysis Note                                Kathryn Banks MRN# [id number]   Age: 77 year old YOB: 1940   Date of Admission: 10/2/2017 Hospital LOS:[GO1.1] 14[GO1.2]          Assessment/Plan:     Admitted in setting of presumed sepsis.  Course complicated by respiratory failure,  Afib with RVR and encephalopathy    Dialysis initiated 10/02/17    1.  ESRD   -tunneled line   -UF goal 2 to 3 liter  2.  Anemia  3.  Secondary hyperparathyroidism  4.  Afib    Continue MWF schedule  Begin arrangement for outpatient dialysis  Consider vascular surgery evaluation for AVF      Interval History:     Seen while on run.  Dialysis parameters reviewed with dialysis RN  IJ tunneled line at 400 ml/min.  2 liter UF.  4 K bath    No patient complaints.  Stable run    ROS     GENERAL: NAD, No fever,chills  R: NEGATIVE for significant cough or SOB  CV: NEGATIVE for chest pain, palpitations  EXT: no change edema  ROS otherwise negative    Dialysis Parameters:     Vitals were reviewed[GO1.1]  Patient Vitals for the past 8 hrs:   BP Temp Temp src Pulse Heart Rate Resp SpO2 Weight   10/16/17 1045 105/82 - - 138 - - - -   10/16/17 1030 118/64 - - 132 - - - -   10/16/17 1015 113/77 - - 129 - - - -   10/16/17 1000 117/72 - - 128 - - - -   10/16/17 0945 122/71 - - 122 - - - -   10/16/17 0930 99/68 - - 142 - - - -   10/16/17 0915 113/63 - - 125 - - - -   10/16/17 0900 131/62 - - 122 - - - -   10/16/17 0845 111/63 - - 133 - - - -   10/16/17 0826 113/68 - - 120 - - - -   10/16/17 0815 92/45 98.4  F (36.9  C) Oral - 128 14 95 % 63.5 kg (139 lb 15.9 oz)   10/16/17 0754 - - - - - - 97 % -   10/16/17 0634 - - - - - - - 63.5 kg (139 lb 15.9 oz)   10/16/17 0551 - - - - 108 - 99 % -     I/O last 3 completed shifts:  In: 365 [P.O.:365]  Out: 215 [Urine:215][GO1.3]    Vitals:    10/13/17 0755 10/14/17 3452  10/15/17 0700 10/16/17 0634   Weight: 65 kg (143 lb 4.8 oz) 64.2 kg (141 lb 8.6 oz) 63.9 kg (140 lb 14 oz) 63.5 kg (139 lb 15.9 oz)    10/16/17 0815   Weight: 63.5 kg (139 lb 15.9 oz)[GO1.2]       Current Weight: 63.5  Dry Weight: 61 range  Dialysis Temp: 36.5  C  Access Device: IJ tunnled  Access Site: right  Dialyzer: Revaclear  Dialysis Bath: 4  Sodium Profile: n  UF Goal: 2  Blood Flow Rate (mL/min): 400  Total Treatment Time (hrs): 3  Heparin: Low dose as required      EPO dose: n  Zemplar: n  IV Fe: n      Medications and Allergies:     Reviewed      Physical Exam:     Seen and examined during course of dialysis run[GO1.1]    /82  Pulse 138  Temp 98.4  F (36.9  C) (Oral)  Resp 14  Wt 63.5 kg (139 lb 15.9 oz)  SpO2 95%  BMI 24.03 kg/m2[GO1.2]    GENERAL: awake, alert, follows  HEENT: NC/AT, PERRLA, EOMI, non icteric, pharynx moist without lesion  NECK: supple, no masses or adenopathy  RESP: symmetric excursion, good effort, lungs clear - no rales, rhonchi or wheezes  CV: RRR, normal S1 S2  ABDOMEN: S/NT, BS present  MS: no edema  SKIN: catheter site clean without drainage  NEURO: speech normal and cranial nerves 2-12 intact  PSYCH: affect normal/bright    Data:[GO1.1]         Recent Labs  Lab 10/16/17  0545      POTASSIUM 4.3   CHLORIDE 111*   CO2 22   ANIONGAP 10   *   BUN 26   CR 4.51*   GFRESTIMATED 9*   GFRESTBLACK 11*   MALICK 7.4*     Recent Labs   Lab Test  10/16/17   0545  10/15/17   0700  10/14/17   0539  10/12/17   0456  10/11/17   0420  10/10/17   0409  10/09/17   0441  10/08/17   0411  10/07/17   1825  10/07/17   0442   CR  4.51*  3.69*  2.65*  2.79*  3.68*  2.80*  3.98*  3.28*  2.93*  3.51*       Recent Labs  Lab 10/16/17  0545 10/12/17  0456 10/11/17  0420 10/10/17  0409   ALBUMIN 2.2* 2.6* 1.9* 2.0*       Recent Labs  Lab 10/16/17  1020 10/16/17  0545 10/15/17  0748 10/15/17  0700 10/14/17  0539 10/12/17  1600 10/12/17  0456  10/10/17  0409   PHOS  --  4.9*  --  3.8  --  3.9   --   --  2.0*   HGB 9.6*  --  10.4*  --  8.7*  --  8.1*  < > 8.5*   < > = values in this interval not displayed.[GO1.4]      BEATRIZ Galeas    [facility] - Nephrology  [phone][GO1.1]             Revision History        User Key Date/Time User Provider Type Action    > GO1.4 10/16/2017 11:04 AM BEATRIZ Galeas MD Physician Sign     GO1.3 10/16/2017 11:02 AM BEATRIZ Galeas MD Physician      GO1.2 10/16/2017 10:56 AM BEATRIZ Galeas MD Physician      GO1.1 10/16/2017 10:55 AM BEATRIZ Galeas MD Physician             Progress Notes by Guadalupe Noriega MD at 10/16/2017  8:32 AM     Author:  Guadalupe Noriega MD Service:  Hospitalist Author Type:  Physician    Filed:  10/16/2017  9:14 AM Date of Service:  10/16/2017  8:32 AM Creation Time:  10/16/2017  8:32 AM    Status:  Signed :  Guadalupe Noriega MD (Physician)         [facility]  Hospitalist Progress Note  Date of service (when I saw the patient) 10/16/2017:         Assessment and Plan:   Ms Kathryn Banks  is a 77-year-old female with a history of diastolic congestive heart failure, EF 60% to 65%, type 2 non-insulin dependent diabetes mellitus, hemoglobin A1c of 7.6, paroxysmal atrial fibrillation, coronary artery disease, status post 5-vessel coronary artery bypass graft in the remote past who was admitted on 10/2/2017 from an outside hospital for evaluation and management of sepsis likely secondary to pneumonia. Upon arrival she was requiring vasopressor support, which has been weaned off. Intermittent hemodialysis was initiated on 10/2/2017 out of concern for acute on chronic renal failure, hyperkalemia, and metabolic acidosis. Her oxygenation has improved and she is currently on HF nasal cannula. Chest CT on 10/10/2017 noted continued bilateral pleural effusions, left greater than right.     Transfered out of ICU on 10/12.     Acute hypoxic respiratory failure:  - Secondary to  suspected pneumonia and volume overload issues related to decompensated diastolic congestive heart failure, Rapid A. fib. Requiring hemodialysis and had thoracentesis for 2 liters. Completed a course of meropenem and vancomycin for possible sepsis with procalcitonin coming down (1.70 on 10/12).   - on room air  - Continue BiPAP at night for ANABEL.       - continue with hemodialysis per Nephrology.        Sepsis secondary to pneumonia:   - although her urinalysis was positive on 10/01 but this appears to have been adequately treated.[PK1.1]   -[PK1.2] Abx course completed as above. Also required a course of vancomycin for C. diff, but last stool on 10/02 was negative.  [PK1.1]  - BC 10/14 NTD[PK1.2]  - remains on enteric isolation as she is still having some diarrhea.         Paroxysmal Atrial fibrillation with rapid ventricular rate:   - s/p ablation & cardioversion in 6/2017  - rate better   - cardiology following started po amiodarone 10/14, repeat limited TTE 10/16 for evaluation of tachycardiomyopathy  -[PK1.1] EP consult today for possible AV node[PK1.2] ablation &[PK1.1] Pacemaker placement[PK1.2]  - chronically anticoagulated on Coumadin with Rph dosing, INR subtherapeutic[PK1.1] 1.54 today[PK1.2].    - Given PRBC x 1 10/14[PK1.1] for hgb 8.7,[PK1.2] cards request to have Hgb>10 for procedure 10/15  - Bld cx per cardiology pending  - Hgb[PK1.1] on 10/15[PK1.2]10.4[PK1.1], repeat today[PK1.3]  - BP improved 10/14 with 500ml fluid bolus[PK1.1] & PRBC[PK1.2]   [PK1.1]  - tolerating[PK1.2] Lopressor 25mg po BID     Acute encephalopathy secondary to ICU delirium, sepsis[PK1.1]:[PK1.2] Improved.  - Conservative management of pain meds and benzodiazepines  - minimize use of benzodiazepines  - consider antipsychotics if acutely agitated (seroquel, zyprexa)        History of diabetes mellitus type 2[PK1.1]:   -[PK1.2] not on insulin at home[PK1.1],[PK1.2] recent hemoglobin A1c was 7.3 in June.  Recheck is 6.6 on 10/13.  She was transitioned off an insulin drip.   - on Lantus, increased to 8 units every morning 10/14  - Medium SSI   - continue mod cho     DVT Prophylaxis: Warfarin and Pneumatic Compression Devices  Code Status: Full Code     Disposition: Expected discharge in 2 days once recovered from AVN ablation/PPM 10/16.[PK1.1]    Guadalupe Noriega MD.  Hospitalist [phone] (7am -6 pm)[PK1.2]                 Interval History:[PK1.1]   Feeling better, denies cough, adb pain.   had 1 watery stool last night. Making some urine. Tolerating reg diet[PK1.3]              Medications:[PK1.1]       sodium chloride 0.9%  250 mL Hemodialysis Machine Once     sodium chloride 0.9%  250-1,000 mL Intravenous Once in dialysis     heparin (porcine)  500 Units Hemodialysis Machine Once in dialysis     amiodarone  200 mg Oral Daily     metoprolol  25 mg Oral BID     insulin glargine  8 Units Subcutaneous QAM AC     pramipexole  0.125 mg Oral TID     insulin aspart  1-7 Units Subcutaneous TID AC     insulin aspart  1-5 Units Subcutaneous At Bedtime     pantoprazole  40 mg Oral QAM     ipratropium - albuterol 0.5 mg/2.5 mg/3 mL  1 vial Nebulization TID     pentoxifylline  400 mg Oral Daily     sodium chloride (PF)  10 mL Intracatheter Q8H     cholecalciferol  5,000 Units Oral Daily     - MEDICATION INSTRUCTIONS for Dialysis Patients -   Does not apply See Admin Instructions     sodium chloride 0.9%, albumin human, heparin, heparin, miconazole, potassium chloride, potassium chloride, magnesium sulfate, potassium phosphate (KPHOS) in D5W IV, potassium phosphate (KPHOS) in D5W IV, potassium phosphate (KPHOS) in D5W IV, potassium phosphate (KPHOS) in D5W IV, Warfarin Therapy Reminder, clonazePAM, metoprolol, hydrALAZINE, lidocaine 4%, sodium chloride (PF), HYDROmorphone, acetaminophen, naloxone, albuterol, IV fluid REPLACEMENT ONLY, glucose **OR** dextrose **OR** glucagon, lidocaine[PK1.4]               Physical Exam:[PK1.1]   Blood pressure 125/70,  pulse 80, temperature 99.5  F (37.5  C), temperature source Oral, resp. rate 16, weight 63.5 kg (139 lb 15.9 oz), SpO2 97 %, not currently breastfeeding.  Wt Readings from Last 4 Encounters:   10/16/17 63.5 kg (139 lb 15.9 oz)   10/01/17 77.3 kg (170 lb 8 oz)   17 77.1 kg (170 lb)   17 76.2 kg (168 lb)[PK1.4]         Vital Sign Ranges  Temperature Temp  Av.1  F (37.3  C)  Min: 98.8  F (37.1  C)  Max: 99.5  F (37.5  C)   Blood pressure Systolic (24hrs), Av , Min:96 , Max:125        Diastolic (24hrs), Av, Min:54, Max:70      Pulse Pulse  Av  Min: 80  Max: 120   Respirations Resp  Av  Min: 16  Max: 16   Pulse oximetry SpO2  Av.2 %  Min: 95 %  Max: 100 %         Intake/Output Summary (Last 24 hours) at 10/16/17 0833  Last data filed at 10/16/17 0633   Gross per 24 hour   Intake              365 ml   Output              215 ml   Net              150 ml       Constitutional: Awake, alert, cooperative, no apparent distress   Lungs: Clear to auscultation bilaterally, no crackles or wheezing   Cardiovascular: Regular rate and rhythm, normal S1 and S2, and no murmur noted   Abdomen: Normal bowel sounds, soft, non-distended, non-tender   Skin: No rashes, no cyanosis, no edema   Neuro:                Data:          Lab Results   Component Value Date     10/16/2017     10/15/2017     10/14/2017    Lab Results   Component Value Date    CHLORIDE 111 10/16/2017    CHLORIDE 111 10/15/2017    CHLORIDE 104 10/14/2017    Lab Results   Component Value Date    BUN 26 10/16/2017    BUN 18 10/15/2017    BUN 12 10/14/2017      Lab Results   Component Value Date    POTASSIUM 4.3 10/16/2017    POTASSIUM 3.9 10/15/2017    POTASSIUM 3.8 10/14/2017    Lab Results   Component Value Date    CO2 22 10/16/2017    CO2 25 10/15/2017    CO2 27 10/14/2017    Lab Results   Component Value Date    CR 4.51 10/16/2017    CR 3.69 10/15/2017    CR 2.65 10/14/2017[PK1.1]        Lab Results   Component  Value Date     (H) 10/16/2017    GLC 94 10/15/2017     (H) 10/14/2017[PK1.4]          Revision History        User Key Date/Time User Provider Type Action    > PK1.3 10/16/2017  9:14 AM Guadalupe Noriega MD Physician Sign     PK1.2 10/16/2017  8:59 AM Guadalupe Noriega MD Physician      PK1.4 10/16/2017  8:33 AM Guadalupe Noriega MD Physician      PK1.1 10/16/2017  8:32 AM Guadalupe Noriega MD Physician             Progress Notes by Sheri Peter MD at 10/14/2017 11:18 AM     Author:  Sheri Peter MD Service:  Cardiology Author Type:  Physician    Filed:  10/15/2017  5:13 PM Date of Service:  10/14/2017 11:18 AM Creation Time:  10/14/2017  1:18 PM    Status:  Addendum :  Sheri Peter MD (Physician)         [facility]    Cardiology Progress Note[MJ1.1]     Sheri Peter[MJ1.2], MD    INTERVAL HISTORY:  Patient dialyzed yesterday. She is starting to make urine. Still having issues with rapid ventricular rates from the atrial fibrillation despite being on IV amiodarone infusion, and intermittently significantly low blood pressures of 70s systolic during which she is completely asymptomatic. She was given a 500 cc bolus of normal saline earlier this morning for low BP of 72/48.    PHYSICAL EXAM:  Heart rate 129 bpm (atrial fibrillation), /72 mmHg  When I visited her, she was comfortably lying in bed, fully alert and oriented x 3, jugular venous pressure is not elevated. Heart sounds are irregularly irregular with a ventricular rate of 120 BPM, no audible murmur, lungs are clear, no lower extremity edema, abdomen is soft. Clinically appears dry.    LABS:  Sodium 137, potassium 3.8, BUN 12, creatinine 2.65, estimated GFR 18, hemoglobin 8.7.    DIAGNOSES:  1. Paroxysmal atrial fibrillation with rapid ventricular rates, recalcitrant to medical therapy.  2. Hypotension.  3. Status post atrial flutter ablation and  cardioversion in June 2017.  4. Stable coronary artery disease status post CABG X five.  5. Sepsis secondary to pneumonia, resolved.  6. Acute kidney injury secondary to #5. Currently requiring hemodialysis.  7. Anemia. Hemoglobin 8.7.    ASSESSMENT/PLAN:  1. Continue oral amiodarone 200 mg daily. Recent TSH normal.  2. Blood pressure has improved following IV fluid bolus. I suspect she is intravascularly dry. Encouraged her to increase oral intake. She has been drastically reducing fluid intake. Recommended fluid intake 6596-0748 mL per day.  3. Add metoprolol tartrate 25 mg twice daily. If she does not tolerate this well, we can back off. She has been on this in the past.  4. Consider another unit of blood transfusion during her next dialysis. This might help the atrial fibrillation rate control.  5. Blood culture today as part of workup for possible AV jacqueline ablation and pacemaker implantation.  6.[MJ1.1] EP consult on Monday, 10/16.[MJ1.3]  7. She was scheduled to be transferred to a long-term facility tomorrow. I think this is premature. Due to her low blood pressure and rate control issues, she will be rehospitalized rapidly. I recommend postponing this for now.[MJ1.1]    Sheri Peter[MJ1.2], MD[MJ1.1]           Revision History        User Key Date/Time User Provider Type Action    > MJ1.3 10/15/2017  5:13 PM Sheri Peter MD Physician Addend     MJ1.2 10/14/2017  1:32 PM Sheri Peter MD Physician Sign     MJ1.1 10/14/2017  1:18 PM Sheri Peter MD Physician             Progress Notes by Sheri Peter MD at 10/15/2017  3:01 PM     Author:  Sheri Peter MD Service:  Cardiology Author Type:  Physician    Filed:  10/15/2017  5:11 PM Date of Service:  10/15/2017  3:01 PM Creation Time:  10/15/2017  5:01 PM    Status:  Signed :  Sheri Peter MD (Physician)         [facility]    Cardiology Progress Note[MJ1.1]     Sheri Andre  [place][MJ1.2], MD    INTERVAL HISTORY:  She received a unit of blood and a small bolus of IV fluids yesterday. With this, she feels like she has more energy, her blood pressures improved to 119/64 mmHg, and her atrial fibrillation rate control although not ideal, has markedly improved from 140-150 BPM to 110-120 BPM. In fact, we have been able to add low-dose metoprolol titrate 25 mg b.i.d. while she continues on oral amiodarone load.    VITAL SIGNS:  Temp 90 8.8, /64, heart rate 80 bpm, respiratory rate 16/m saturations 96% on room air, weight 65 kg.    PHYSICAL EXAMINATION:  Constitutional: Comfortably lying in bed. It seems more alert and less dry today. Eyes: No pallor or icterus or xanthelasma.  Respiratory: Normal respiratory effort.  Bilateral normal breath sounds.  No rales or wheeze.  Cardiovascular: Heart sounds are irregularly irregular, 122 BPM, no audible murmur.   Neuropsychiatric: Alert, oriented ×3.  No gross motor deficit.  Normal mood and affect.  Extremities: No lower extremity edema.       ECG done today shows atrial fibrillation with a ventricular rate of 100 BPM,  ms.    LABS: Calcium 3.9, creatinine 3.69 (dialysis dependent), BUN 18, hemoglobin 10.4.    DIAGNOSES:  1. Paroxysmal atrial fibrillation with rapid ventricular rates, rate controlled improving with the blood transfusion, and the amiodarone/metoprolol combination.  2. Status post atrial flutter ablation and cardioversion in June 2017.  3. Stable coronary artery disease status post CABG X 5.  4. Sepsis secondary to pneumonia, resolved.  5. Acute kidney injury secondary to #4. Currently requiring hemodialysis.       ASSESSMENT/PLAN:  She has significantly improved following a fluid bolus and blood transfusion yesterday. Afib rate control is also improving. Blood pressure is better. Tolerating addition of metoprolol titrate 25 mg b.i.d. Daily for dialysis tomorrow.    1. Continue oral amiodarone 200 mg b.i.d. For 5 more days  followed by 200 mg daily. Recent TSH normal.  2. Continue metoprolol tartrate 25 mg twice daily. If she does not tolerate this well, we can back off.   3. Continue warfarin anticoagulation.   4. EP consult on Monday, 10/16 to discuss possible AV jacqueline ablation and pacemaker implantation.       Sheri Peter MD[MJ1.1]         Revision History        User Key Date/Time User Provider Type Action    > MJ1.2 10/15/2017  5:11 PM Sheri Peter MD Physician Sign     MJ1.1 10/15/2017  5:01 PM Sheri Peter MD Physician             Progress Notes by Deandre Rawls LSW at 10/15/2017  3:38 PM     Author:  Deandre Rawls LSW Service:  Social Work Author Type:      Filed:  10/15/2017  3:39 PM Date of Service:  10/15/2017  3:38 PM Creation Time:  10/15/2017  3:38 PM    Status:  Signed :  Deandre Rawls LSW ()           D: Per pt.'s conversation with Care Coordinator, referrals were faxed to [facility] and  [facility].  P:  will continue to follow to assist with d/c planning.[JF1.1]     Revision History        User Key Date/Time User Provider Type Action    > JF1.1 10/15/2017  3:39 PM Deandre Rawls LSW  Sign            Progress Notes by Cassy Vicente RN at 10/15/2017 12:18 PM     Author:  Cassy Vicente RN Service:  Nursing Author Type:      Filed:  10/15/2017 12:22 PM Date of Service:  10/15/2017 12:18 PM Creation Time:  10/15/2017 12:18 PM    Status:  Signed :  Cassy Vicente RN ()         Met with patient and spouse, Christian. Explained that therapies are recommending TCU now and not LTACH. She is currently on RA, no oxygen. Patient would like [facility] as 1st choice and [facility] in [place]. Relayed info to  at this time.[BW1.1]     Revision History        User Key Date/Time User Provider Type Action    > BW1.1 10/15/2017 12:22 PM Cassy Vicente RN   Sign            Progress Notes by Jud Abbott RN at 10/15/2017  9:51 AM     Author:  Jud Abbott RN Service:  (none) Author Type:  Registered Nurse    Filed:  10/15/2017  9:52 AM Date of Service:  10/15/2017  9:51 AM Creation Time:  10/15/2017  9:51 AM    Status:  Signed :  Jud Abbott RN (Registered Nurse)         Md updated about asymmetrical pupils with right being larger and sluggish, Dr. Jeong was just in seeing patient and didn't note any neurologic concerns, states to monitor for now.[JB1.1]       Revision History        User Key Date/Time User Provider Type Action    > JB1.1 10/15/2017  9:52 AM Jud Abbott RN Registered Nurse Sign            Progress Notes by Francisca Jeong MD at 10/15/2017  9:38 AM     Author:  Francisca Jeong MD Service:  Hospitalist Author Type:  Physician    Filed:  10/15/2017  9:44 AM Date of Service:  10/15/2017  9:38 AM Creation Time:  10/15/2017  9:38 AM    Status:  Addendum :  Francisca Jeong MD (Physician)         [facility]    Hospitalist Progress Note    Date of Service (when I saw the patient): 10/15/2017    Assessment & Plan   This is a 77-year-old female with a history of diastolic congestive heart failure, EF 60% to 65%, type 2 non-insulin dependent diabetes mellitus, hemoglobin A1c of 7.6, paroxysmal atrial fibrillation, coronary artery disease, status post 5-vessel coronary artery bypass graft in the remote past who was admitted to the [facility] for infectious disease issues, was transferred to our ICU due to onset of sepsis and respiratory failure.  She is improving and stable on cardiac telemetry with issus of rapid afib and ongoing HD requirements precluding discharge.      PLAN:   1.  Acute hypoxic respiratory failure.    - Secondary to suspected pneumonia and volume overload issues related to decompensated diastolic congestive heart failure.  Requiring hemodialysis and had thoracentesis for 2 liters. Completed a course of meropenem and vancomycin for possible sepsis with procalcitonin coming down (1.70 on 10/12).   - on room air  - Continue BiPAP at night for ANABEL.       - continue with hemodialysis per Nephrology.       2.  Sepsis secondary to pneumonia, although her urinalysis was positive on 10/01 but this appears to have been adequately treated. Abx course completed as above. Also required a course of vancomycin for C. diff, but last stool on 10/02 was negative.    - remains on enteric isolation as she is still having some diarrhea.        3.  Atrial fibrillation with rapid ventricular rate.    - cardiology recommendations appreciated, starting po amiodarone 10/14, repeat limited TTE 10/16 for evaluation of tachycardiomyopathy, and consideration for AVN ablation/PPM  - chronically anticoagulated on Coumadin with Rph dosing, INR subtherapeutic.    - Given PRBC x 1 10/14 per cards request to have Hgb>10 for procedure 10/15[SN1.1]  - Bld cx per cardiology pending[SN1.2]  - Hgb this morning 10.4  - Lopressor 25mg po BID  - BP improved 10/14 with 500ml fluid bolus, encourage pt to increase oral intake as she was trying to fluid restrict herself      4.  Acute encephalopathy secondary to ICU delirium, sepsis. Improved.  - Conservative management of pain meds and benzodiazepines  - minimize use of benzodiazepines  - consider antipsychotics if acutely agitated (seroquel, zyprexa)       5.  History of diabetes mellitus type 2, not on insulin at home.   Most recent hemoglobin A1c was 7.3 in June.  Recheck is 6.6 on 10/13. She was transitioned off an insulin drip.   - on Lantus, increased to 8 units every morning 10/14  - Medium SSI   - continue mod cho    DVT Prophylaxis: Warfarin and Pneumatic Compression Devices  Code Status: Full Code    Disposition: Expected discharge in 2 days once recovered from AVN ablation/PPM 10/16.    Francisca Jeong MD    Interval  History   No new complaints. Denies dizziness, chest pain, SOB. Trying to increase po intake fluids, previously someone had told her to restrict fluid intake.    -Data reviewed today: I reviewed all new labs and imaging results over the last 24 hours. I personally reviewed no images or EKG's today.    Physical Exam   Temp: 99.1  F (37.3  C) Temp src: Oral BP: 96/54 (MD aware, states not to hold BP meds) Pulse: 120 Heart Rate: 122 Resp: 16 SpO2: 95 % O2 Device: None (Room air)    Vitals:    10/13/17 0755 10/14/17 0500 10/15/17 0700   Weight: 65 kg (143 lb 4.8 oz) 64.2 kg (141 lb 8.6 oz) 63.9 kg (140 lb 14 oz)     Vital Signs with Ranges  Temp:  [96  F (35.6  C)-99.4  F (37.4  C)] 99.1  F (37.3  C)  Pulse:  [118-122] 120  Heart Rate:  [114-140] 122  Resp:  [16-18] 16  BP: ()/(48-72) 96/54  SpO2:  [94 %-99 %] 95 %  I/O last 3 completed shifts:  In: 2597.5 [P.O.:1340; I.V.:462.5; IV Piggyback:500]  Out: 100 [Urine:100]    Constitutional: Awake, alert, cooperative, no apparent distress  Respiratory: Clear to auscultation bilaterally, no crackles or wheezing  Cardiovascular: Irregular rhythm, tachy, normal S1 and S2, and no murmur noted  GI: Normal bowel sounds, soft, non-distended, non-tender  Skin/Integumen: No rashes, no cyanosis, no edema[SN1.1]    Medications[SN1.3]     Warfarin Therapy Reminder       IV fluid REPLACEMENT ONLY[SN1.1]         [START ON 10/16/2017] amiodarone  200 mg Oral Daily     metoprolol  25 mg Oral BID     insulin glargine  8 Units Subcutaneous QAM AC     pramipexole  0.125 mg Oral TID     insulin aspart  1-7 Units Subcutaneous TID AC     insulin aspart  1-5 Units Subcutaneous At Bedtime     pantoprazole  40 mg Oral QAM     ipratropium - albuterol 0.5 mg/2.5 mg/3 mL  1 vial Nebulization TID     pentoxifylline  400 mg Oral Daily     sodium chloride (PF)  10 mL Intracatheter Q8H     cholecalciferol  5,000 Units Oral Daily     - MEDICATION INSTRUCTIONS for Dialysis Patients -   Does not apply  See Admin Instructions[SN1.3]       Data[SN1.1]     Recent Labs  Lab 10/15/17  0748 10/15/17  0700 10/14/17  0539 10/13/17  0644 10/12/17  1040 10/12/17  0456 10/11/17  0420   WBC  --   --  7.4  --   --  9.4 8.4   HGB 10.4*  --  8.7*  --   --  8.1* 8.6*   MCV  --   --  92  --   --  91 90   PLT  --   --  205  --   --  161 152   INR  --  1.29* 1.27* 1.19*  --  1.21* 1.08   NA  --  144 137  --   --  142 145*   POTASSIUM  --  3.9 3.8  --   --  3.7 3.7   CHLORIDE  --  111* 104  --   --  107 110*   CO2  --  25 27  --   --  29 28   BUN  --  18 12  --   --  12 18   CR  --  3.69* 2.65*  --   --  2.79* 3.68*   ANIONGAP  --  8 6  --   --  6 7   MALICK  --  7.7* 8.0*  --   --  7.4* 7.8*   GLC  --  94 263* 174*  --  162* 139*   ALBUMIN  --   --   --   --   --  2.6* 1.9*   PROTTOTAL  --   --   --   --  5.1* 5.0* 4.9*   BILITOTAL  --   --   --   --   --  0.4 0.3   ALKPHOS  --   --   --   --   --  128 145   ALT  --   --   --   --   --  8 9   AST  --   --   --   --   --  9 12[SN1.3]       No results found for this or any previous visit (from the past 24 hour(s)).[SN1.1]     Revision History        User Key Date/Time User Provider Type Action    > [N/A] 10/15/2017  9:44 AM Francisca Jeong MD Physician Addend     SN1.2 10/15/2017  9:44 AM Francisca Jeong MD Physician Sign     SN1.3 10/15/2017  9:43 AM Francisca Jeong MD Physician      SN1.1 10/15/2017  9:38 AM Francisca Jeong MD Physician                      Procedure Notes      Procedures by Destin Echavarria MD at 10/6/2017 12:08 PM     Author:  Destin Echavarria MD Service:  Interventional Radiology Author Type:  Physician    Filed:  10/6/2017 12:08 PM Date of Service:  10/6/2017 12:08 PM Creation Time:  10/6/2017 12:01 PM    Status:  Signed :  Destin Echavarria MD (Physician)         RADIOLOGY PROCEDURE NOTE  Patient name: Kathryn Banks  MRN: [id number]  : 1940    Pre-procedure diagnosis: Need for hemodialysis  Post-procedure  diagnosis: Same    Procedure Date/Time: October 6, 2017  12:01 PM  Procedure: Right IJ tunneled hemodialysis catheter, 19 cm.  Tip at RA/SVC junction.  Heparinized.  Ready for use.  After sedation, patient was flat with desaturations to mid 60's.  With facemask and 15 L O2, patient did not return to baseline saturations (90's).  Therefore, bipap replaced and patient's breathing status normalized throughout procedure.  Estimated blood loss: None  Specimen(s) collected with description: None  The patient tolerated the procedure well with no immediate complications.  Significant findings:  Please see above.    See imaging dictation for procedural details.    Provider name: Destin Echavarria  Assistant(s):None[BD1.1]         Revision History        User Key Date/Time User Provider Type Action    > BD1.1 10/6/2017 12:08 PM Destin Echavarria MD Physician Sign            Procedures by Emy Fofana MD at 10/2/2017 11:34 PM     Author:  Emy Fofana MD Service:  General Medicine Author Type:  Physician    Filed:  10/2/2017 11:34 PM Date of Service:  10/2/2017 11:34 PM Creation Time:  10/2/2017 11:33 PM    Status:  Signed :  Emy Fofana MD (Physician)     Pre-procedure Diagnoses:    1. ESRD (end stage renal disease) on dialysis (H) [N18.6, Z99.2]           Post-procedure Diagnoses:    1. ESRD (end stage renal disease) on dialysis (H) [N18.6, Z99.2]                 Procedure: Dialysis Access Placement  Consent:   1. Obtained from  after discussion of risk/benefit/alternatives) and all questions answered to the best of my knowledge. This signed consent is in the chart.        Universal Protocol:   Patient Identification was verified, time out was performed, site marking N/A, Imaging data N/A. Full Barrier precaution done: Hands washed, mask, gloves, gown, cap and eye protection all used.    Indication: Dialysis    Narrative: The vein was identified with portable  ultrasound device (compression test, venous color and doppler flow pattern). Area prepped with chlorhexedine and Patient was head to toe draped. Sterile technique and full barrier precautions used. 1% lidocaine injected subcutaneously for local anesthesia. A dialysis catheter was inserted using standard Seldinger technique under real time US guidance after careful evaluation of the best side to minimize risk of infection and complication related to insertion. The central line was sutured at 16 cm depth.       Complications: No apparent complication.    Procedure performed by Dr Fofana on October 2, 2017[SK1.1]         Revision History        User Key Date/Time User Provider Type Action    > SK1.1 10/2/2017 11:34 PM Emy Fofana MD Physician Sign                     Progress Notes - Therapies (Notes from 10/15/17 through 10/18/17)      Progress Notes by Lesa Keller RT at 10/16/2017  1:49 PM     Author:  Lesa Keller RT Service:  Respiratory Therapy Author Type:  Respiratory Therapist    Filed:  10/16/2017  1:50 PM Date of Service:  10/16/2017  1:49 PM Creation Time:  10/16/2017  1:49 PM    Status:  Signed :  Lesa Keller RT (Respiratory Therapist)         Pt is on room air with Sp02 of 96%. Lung sounds: clear/diminished. Neb tx given as schedule. Will continue to follow.  10/16/2017  Lesa Keller[SM1.1]     Revision History        User Key Date/Time User Provider Type Action    > SM1.1 10/16/2017  1:50 PM Lesa Keller RT Respiratory Therapist Sign

## 2017-10-02 NOTE — PROGRESS NOTES
[facility]  Critical Care Service    Progress Note  Date of Service: 10/02/2017     Assessment & Plan   Kathryn Banks is a 77 year old female who was admitted on 10/2/2017.     CNS: Alert, somewhat confused though is oriented.  - Pain: Hydromorphone prn for leg pain. Cautious with dose.  - Altered mental status: Seems to be secondary to meds, though will need to follow closely.  Pulm: Oxygenating on FM 10L.  - Pulmonary insufficiency: Wean O2 as tolerated, continue pulmonary toilet. Rechecking CXR this morning.  CV: 40s-50s, 70s/40s-90s/50s.  - Hypotension: ?Septic shock. On norepinephrine 0.16 mcg/kg/min at my visit. Lactate 1, so O2 delivery seems to be fine. Based on chart review only got about 1L of IVF because of high BNP and concerns about CKD and CHF; she may be a bit under-resuscitated. Will try gentle fluid challenge with colloid this AM. Check cortisol. If NE demands increase, add vasopressin.  - HTN: Meds held.  - Atrial fibrillation: Rate 50s, anticoagulation held.  FEN/GI: Abdomen soft, nontender.  - Nutrition: OK for clears if she tolerates. Once off pressors, move to renal diet.  : UOP slow (10/hr or so), Cr up from baseline at 5.15.  - Metabolic acidosis: Non-gap, normal lactate; secondary to renal failure. Added bicarbonate gtt, follow.  - Hyperkalemia: Improving with bicarbonate.  Heme: Hb 8.1, plt 307, INR 6.  - Anemia of chronic disease: No indication for transfusion.   - Supratherapeutic INR: Vitamin K 5 mg yesterday with no effect; adding 5 mg IV qd x 3 days. No evidence of bleeding.  Musculoskeletal: Pain, multiple ulcers on bilateral lower extremities; covered with adaptec and kerlix at my visit.  - Venous vs arterial insufficiency ulcers: WOC consultation.   Endocrine: Glucose 169-323 (though 62 this AM).  - DM2: On insulin gtt, holding for now as she is hypoglycemic. D50 x 1.  ID: Afebrile, though hypothermic on arrival. WBCs 10.  - Pneumonia: Presumed, based on CXR  findings. On pip/tazo, vancomycin.  - UTI: Culture pending, on broad-spectrum antibiotics.  - C difficile colitis: On enteral vancomycin.  ICU: Warfarin (high INR).    General cares:  DVT Prophylaxis: Warfarin  GI Prophylaxis: Not indicated  Restraints: Restraints for medical healing needed: NO  Family update by me today: Yes   Current lines are required for patient management  Access: (R)IJ CVL 10/2    Dwaine Tabor MD, PhD  Surgical critical care  October 2, 2017, 12:27 PM    Time Spent on this Encounter   Billing:  I spent 60 minutes bedside and on the inpatient unit today managing the critical care of Kathryn Banks in relation to the issues listed in this note.    Main Plans for Today   - Fluid resuscitation, wean pressors as able.  - Nephrology evaluation.  - Wound nurse consultation.    Interval History   Admitted overnight with sepsis, hypotension. Comfortable this morning with no complaints other than mild leg pain. No dyspnea, has a productive cough. No nausea.     Physical Exam   Temp: 92.6  F (33.7  C) Temp src: Axillary Temp  Min: 90.1  F (32.3  C)  Max: 95  F (35  C) BP: 111/77   Heart Rate: 57 Resp: 19 SpO2: 92 % O2 Device: Simple face mask Oxygen Delivery: 10 LPM  Vitals:    10/02/17 0400   Weight: 80 kg (176 lb 5.9 oz)     I/O last 3 completed shifts:  In: -   Out: 15 [Urine:15]    GEN: Alert, slightly confused but answers most questions appropriately  EYES: Anicteric   HEENT: NC/AT  CV: Bradycardic in the 50s  PULM/CHEST: Coarse breath sounds, unlabored breathing  GI: Abdomen soft, NT/ND  : Cee in place  EXTREMITIES: (B)LE with evidence of venous insufficiency, multiple small ulcers   NEURO: GCS 14, WAHL  SKIN: Warm  PSYCH:  Affect appropriate    Imaging personally reviewed:  - CXR 10/2 with LLL consolidation, possible effusion.    Medications     norepinephrine 0.16 mcg/kg/min (10/02/17 0906)     vasopressin (PITRESSIN) infusion ADULT (40 mL)       IV fluid REPLACEMENT ONLY       insulin  (regular) 3 Units/hr (10/02/17 0847)     sodium bicabonate in 5% dextrose for infusion 75 mL/hr at 10/02/17 1212       piperacillin-tazobactam  2.25 g Intravenous Q6H     vancomycin place maki - receiving intermittent dosing  1 each Does not apply See Admin Instructions     vancomycin  250 mg Oral 4x Daily     zz extemporaneous template   Intravenous Daily       Data     Recent Labs  Lab 10/02/17  1045 10/02/17  0400 10/01/17  2145 09/28/17   WBC 10.1  --  6.7  --    HGB 8.1*  --  8.1*  --    MCV 83  --  85  --      --  257  --    INR 6.00* 5.71*  --  2.2   *  --  145*  --    POTASSIUM 4.8 5.5* 5.6*  --    CHLORIDE 121*  --  120*  --    CO2 14*  --  11*  --    BUN 58*  --  60*  --    CR 5.15*  --  5.23*  --    ANIONGAP 11  --  14  --    MALICK 6.7*  --  7.0*  --    GLC 72  --  323*  --    ALBUMIN  --   --  2.0*  --    PROTTOTAL  --   --  5.5*  --    BILITOTAL  --   --  0.3  --    ALKPHOS  --   --  197*  --    ALT  --   --  16  --    AST  --   --  16  --      Recent Results (from the past 24 hour(s))   XR Chest Port 1 View    Narrative    XR CHEST PORT 1 VIEWS   10/2/2017 12:14 AM     INDICATION: Confirm central line placement.    COMPARISON: 10/1/2017 at 0920 hours.      Impression    IMPRESSION: New right IJ line with tip at the level of the cavoatrial  junction. No other significant change. Sternotomy. Borderline  cardiomegaly. Opacity in the left lower hemithorax likely due to a  combination of pleural fluid and infiltrate or atelectasis. Mild  diffuse interstitial prominence may relate to a shallow inspiration.    BERNT SEGURA MD   XR Chest Port 1 View    Narrative    XR CHEST PORT 1 VIEW   10/2/2017 12:35 AM     INDICATION: Catheter repositioned.    COMPARISON: 10/2/2017 at 0010 hours.      Impression    IMPRESSION: Since the earlier film, the right IJ line has been pulled  back with the tip now in the lower SVC. No other change. Persistent  left lower chest opacity likely due to pleural  effusion and associated  infiltrate or atelectasis. Borderline cardiomegaly.    BRENT SEGURA MD

## 2017-10-02 NOTE — IP AVS SNAPSHOT
"          [facility] CARDIAC SPECIALTY CARE: [phone]                                              INTERAGENCY TRANSFER FORM - LAB / IMAGING / EKG / EMG RESULTS   10/2/2017                    Hospital Admission Date: 10/2/2017  MARI HERNANDEZ   : 1940  Sex: Female        Attending Provider: Rashard Millan MD     Allergies:  Ciprofloxacin, Oxycodone, Lisinopril, Sulfa Drugs    Infection:  None   Service:  HOSPITALIST    Ht:  1.626 m (5' 4\")   Wt:  62.2 kg (137 lb 2 oz)   Admission Wt:  80 kg (176 lb 5.9 oz)    BMI:  23.54 kg/m 2   BSA:  1.68 m 2            Patient PCP Information     Provider PCP Type    Dheeraj Jj MD General         Lab Results - 3 Days      Glucose by meter [[id number]]  Resulted: 10/18/17 0806, Result status: Final result    Ordering provider: Beth Laird MD  10/18/17 0756 Resulting lab: POINT OF CARE TEST, GLUCOSE    Specimen Information    Type Source Collected On     10/18/17 0756          Components       Value Reference Range Flag Lab   Glucose 97 70 - 99 mg/dL  170            INR [[id number]] (Abnormal)  Resulted: 10/18/17 0656, Result status: Final result    Ordering provider: Cyn Shelton APRN CNP  10/18/17 0000 Resulting lab: [facility]    Specimen Information    Type Source Collected On   Blood  10/18/17 0625          Components       Value Reference Range Flag Lab   INR 1.73 0.86 - 1.14 H FrStHsLb            Blood culture [[id number]]  Resulted: 10/18/17 0213, Result status: Preliminary result    Ordering provider: Davey Palma MD  10/14/17 1502 Resulting lab: MICRO RAPID TESTING LAB    Specimen Information    Type Source Collected On   Blood Line, venous 10/14/17 1500   Comment:  PICC~Red~LUMEN          Components       Value Reference Range Flag Lab   Specimen Description Blood PICC Red LUMEN      Special Requests Aerobic and anaerobic bottles received   FrStHsLb   Culture Micro No growth after 4 days   226 "            Blood culture [[id number]]  Resulted: 10/18/17 0213, Result status: Preliminary result    Ordering provider: Francisca Jeong MD  10/14/17 1432 Resulting lab: MICRO RAPID TESTING LAB    Specimen Information    Type Source Collected On   Blood  10/14/17 1540   Comment:  Unspecified Site          Components       Value Reference Range Flag Lab   Specimen Description Blood Unspecified Site      Special Requests Aerobic and anaerobic bottles received   FrStHsLb   Culture Micro No growth after 4 days   226            Glucose by meter [[id number]] (Abnormal)  Resulted: 10/17/17 2131, Result status: Final result    Ordering provider: Beth Laird MD  10/17/17 2121 Resulting lab: POINT OF CARE TEST, GLUCOSE    Specimen Information    Type Source Collected On     10/17/17 2121          Components       Value Reference Range Flag Lab   Glucose 137 70 - 99 mg/dL H 170            Glucose by meter [[id number]] (Abnormal)  Resulted: 10/17/17 1706, Result status: Final result    Ordering provider: Beth Laird MD  10/17/17 1703 Resulting lab: POINT OF CARE TEST, GLUCOSE    Specimen Information    Type Source Collected On     10/17/17 1703          Components       Value Reference Range Flag Lab   Glucose 156 70 - 99 mg/dL H 170            Glucose by meter [[id number]]  Resulted: 10/17/17 1217, Result status: Final result    Ordering provider: Beth Laird MD  10/17/17 1211 Resulting lab: POINT OF CARE TEST, GLUCOSE    Specimen Information    Type Source Collected On     10/17/17 1211          Components       Value Reference Range Flag Lab   Glucose 96 70 - 99 mg/dL  170            INR [[id number]] (Abnormal)  Resulted: 10/17/17 0828, Result status: Final result    Ordering provider: Cyn Shelton APRN CNP  10/17/17 0000 Resulting lab: [facility]    Specimen Information    Type Source Collected On   Blood  10/17/17 0615          Components       Value Reference Range  Flag Lab   INR 1.51 0.86 - 1.14 H FrStHsLb            Glucose by meter [[id number]]  Resulted: 10/17/17 0746, Result status: Final result    Ordering provider: Beth Laird MD  10/17/17 0734 Resulting lab: POINT OF CARE TEST, GLUCOSE    Specimen Information    Type Source Collected On     10/17/17 0734          Components       Value Reference Range Flag Lab   Glucose 73 70 - 99 mg/dL  170            Glucose by meter [[id number]]  Resulted: 10/17/17 0555, Result status: Final result    Ordering provider: Beth Laird MD  10/17/17 0521 Resulting lab: POINT OF CARE TEST, GLUCOSE    Specimen Information    Type Source Collected On     10/17/17 0521          Components       Value Reference Range Flag Lab   Glucose 92 70 - 99 mg/dL  170            Glucose by meter [[id number]] (Abnormal)  Resulted: 10/17/17 0255, Result status: Final result    Ordering provider: Beth Laird MD  10/17/17 0237 Resulting lab: POINT OF CARE TEST, GLUCOSE    Specimen Information    Type Source Collected On     10/17/17 0237          Components       Value Reference Range Flag Lab   Glucose 64 70 - 99 mg/dL L 170            Glucose by meter [[id number]] (Abnormal)  Resulted: 10/16/17 2133, Result status: Final result    Ordering provider: Beth Laird MD  10/16/17 2110 Resulting lab: POINT OF CARE TEST, GLUCOSE    Specimen Information    Type Source Collected On     10/16/17 2110          Components       Value Reference Range Flag Lab   Glucose 104 70 - 99 mg/dL H 170            Glucose by meter [[id number]]  Resulted: 10/16/17 1701, Result status: Final result    Ordering provider: Beth Laird MD  10/16/17 1651 Resulting lab: POINT OF CARE TEST, GLUCOSE    Specimen Information    Type Source Collected On     10/16/17 1651          Components       Value Reference Range Flag Lab   Glucose 79 70 - 99 mg/dL  170            Glucose by meter [[id number]] (Abnormal)  Resulted: 10/16/17 1146, Result status:  Final result    Ordering provider: Beth Laird MD  10/16/17 1139 Resulting lab: POINT OF CARE TEST, GLUCOSE    Specimen Information    Type Source Collected On     10/16/17 1139          Components       Value Reference Range Flag Lab   Glucose 127 70 - 99 mg/dL H 170            Hemoglobin [[id number]] (Abnormal)  Resulted: 10/16/17 1038, Result status: Final result    Ordering provider: Guadalupe Noriega MD  10/16/17 0915 Resulting lab: [facility]    Specimen Information    Type Source Collected On   Blood  10/16/17 1020          Components       Value Reference Range Flag Lab   Hemoglobin 9.6 11.7 - 15.7 g/dL L FrStHsLb            Glucose by meter [[id number]] (Abnormal)  Resulted: 10/16/17 0831, Result status: Final result    Ordering provider: Beth Laird MD  10/16/17 0822 Resulting lab: POINT OF CARE TEST, GLUCOSE    Specimen Information    Type Source Collected On     10/16/17 0822          Components       Value Reference Range Flag Lab   Glucose 112 70 - 99 mg/dL H 170            Fluid Culture Aerobic Bacterial [[id number]]  Resulted: 10/16/17 0701, Result status: Final result    Ordering provider: Emy Armas APRN CNP  10/11/17 0712 Resulting lab: INFECTIOUS DISEASE DIAGNOSTIC LABORATORY    Specimen Information    Type Source Collected On   Pleural fluid Pleural fluid 10/11/17 1200   Comment:  Left          Components       Value Reference Range Flag Lab   Specimen Description Pleural fluid Left      Culture Micro No growth   225            Renal panel [[id number]] (Abnormal)  Resulted: 10/16/17 0700, Result status: Final result    Ordering provider: Francisca Jeong MD  10/16/17 0000 Resulting lab: [facility]    Specimen Information    Type Source Collected On   Blood  10/16/17 0545          Components       Value Reference Range Flag Lab   Sodium 143 133 - 144 mmol/L  FrStHsLb   Potassium 4.3 3.4 - 5.3 mmol/L  FrStHsLb   Chloride 111 94 -  109 mmol/L H FrStHsLb   Carbon Dioxide 22 20 - 32 mmol/L  FrStHsLb   Anion Gap 10 3 - 14 mmol/L  FrStHsLb   Glucose 102 70 - 99 mg/dL H FrStHsLb   Urea Nitrogen 26 7 - 30 mg/dL  FrStHsLb   Creatinine 4.51 0.52 - 1.04 mg/dL H FrStHsLb   GFR Estimate 9 >60 mL/min/1.7m2 L FrStHsLb   Comment:  Non  GFR Calc   GFR Estimate If Black 11 >60 mL/min/1.7m2 L FrStHsLb   Comment:  African American GFR Calc   Calcium 7.4 8.5 - 10.1 mg/dL L FrStHsLb   Phosphorus 4.9 2.5 - 4.5 mg/dL H FrStHsLb   Albumin 2.2 3.4 - 5.0 g/dL L FrStHsLb            INR [[id number]] (Abnormal)  Resulted: 10/16/17 0631, Result status: Final result    Ordering provider: Cyn Shelton APRN CNP  10/16/17 0000 Resulting lab: [facility]    Specimen Information    Type Source Collected On   Blood  10/16/17 0545          Components       Value Reference Range Flag Lab   INR 1.54 0.86 - 1.14 H FrStHsLb            Glucose by meter [[id number]] (Abnormal)  Resulted: 10/16/17 0216, Result status: Final result    Ordering provider: Beth Laird MD  10/16/17 0212 Resulting lab: POINT OF CARE TEST, GLUCOSE    Specimen Information    Type Source Collected On     10/16/17 0212          Components       Value Reference Range Flag Lab   Glucose 105 70 - 99 mg/dL H 170            Glucose by meter [[id number]] (Abnormal)  Resulted: 10/15/17 2125, Result status: Final result    Ordering provider: Beth Laird MD  10/15/17 2120 Resulting lab: POINT OF CARE TEST, GLUCOSE    Specimen Information    Type Source Collected On     10/15/17 2120          Components       Value Reference Range Flag Lab   Glucose 272 70 - 99 mg/dL H 170            Glucose by meter [[id number]] (Abnormal)  Resulted: 10/15/17 1726, Result status: Final result    Ordering provider: Beth Laird MD  10/15/17 1722 Resulting lab: POINT OF CARE TEST, GLUCOSE    Specimen Information    Type Source Collected On     10/15/17 1722           Components       Value Reference Range Flag Lab   Glucose 133 70 - 99 mg/dL H 170            Glucose by meter [[id number]] (Abnormal)  Resulted: 10/15/17 1200, Result status: Final result    Ordering provider: Beth Laird MD  10/15/17 1157 Resulting lab: POINT OF CARE TEST, GLUCOSE    Specimen Information    Type Source Collected On     10/15/17 1157          Components       Value Reference Range Flag Lab   Glucose 141 70 - 99 mg/dL H 170            Hemoglobin [[id number]] (Abnormal)  Resulted: 10/15/17 0816, Result status: Final result    Ordering provider: Francisca Jeong MD  10/15/17 0715 Resulting lab: [facility]    Specimen Information    Type Source Collected On   Blood  10/15/17 0748          Components       Value Reference Range Flag Lab   Hemoglobin 10.4 11.7 - 15.7 g/dL L FrStHsLb            INR [[id number]] (Abnormal)  Resulted: 10/15/17 0739, Result status: Final result    Ordering provider: Cyn Shelton APRN CNP  10/15/17 0000 Resulting lab: [facility]    Specimen Information    Type Source Collected On   Blood  10/15/17 0700          Components       Value Reference Range Flag Lab   INR 1.29 0.86 - 1.14 H FrStHsLb            Phosphorus [[id number]]  Resulted: 10/15/17 0733, Result status: Final result    Ordering provider: Hank Guerra MD  10/15/17 0000 Resulting lab: [facility]    Specimen Information    Type Source Collected On   Blood  10/15/17 0700          Components       Value Reference Range Flag Lab   Phosphorus 3.8 2.5 - 4.5 mg/dL  FrStHsLb            Basic metabolic panel [[id number]] (Abnormal)  Resulted: 10/15/17 0733, Result status: Final result    Ordering provider: Hank Guerra MD  10/15/17 0000 Resulting lab: [facility]    Specimen Information    Type Source Collected On   Blood  10/15/17 0700          Components       Value Reference Range Flag Lab   Sodium 144 133 - 144  mmol/L  FrStHsLb   Potassium 3.9 3.4 - 5.3 mmol/L  FrStHsLb   Chloride 111 94 - 109 mmol/L H FrStHsLb   Carbon Dioxide 25 20 - 32 mmol/L  FrStHsLb   Anion Gap 8 3 - 14 mmol/L  FrStHsLb   Glucose 94 70 - 99 mg/dL  FrStHsLb   Urea Nitrogen 18 7 - 30 mg/dL  FrStHsLb   Creatinine 3.69 0.52 - 1.04 mg/dL H FrStHsLb   GFR Estimate 12 >60 mL/min/1.7m2 L FrStHsLb   Comment:  Non  GFR Calc   GFR Estimate If Black 14 >60 mL/min/1.7m2 L FrStHsLb   Comment:  African American GFR Calc   Calcium 7.7 8.5 - 10.1 mg/dL L FrStHsLb            Magnesium [[id number]]  Resulted: 10/15/17 0733, Result status: Final result    Ordering provider: Hank Guerra MD  10/15/17 0000 Resulting lab: [facility]    Specimen Information    Type Source Collected On   Blood  10/15/17 0700          Components       Value Reference Range Flag Lab   Magnesium 1.8 1.6 - 2.3 mg/dL  FrStHsLb            Glucose by meter [[id number]] (Abnormal)  Resulted: 10/15/17 0241, Result status: Final result    Ordering provider: Beth Laird MD  10/15/17 0223 Resulting lab: POINT OF CARE TEST, GLUCOSE    Specimen Information    Type Source Collected On     10/15/17 0223          Components       Value Reference Range Flag Lab   Glucose 118 70 - 99 mg/dL H 170            Testing Performed By     Lab - Abbreviation Name Director Address Valid Date Range    14 - [facility] Unknown [address] [place] [address] 05/08/15 1057 - Present    170 - Unknown POINT OF CARE TEST, GLUCOSE Unknown Unknown 10/31/11 1114 - Present    225 - Unknown INFECTIOUS DISEASE DIAGNOSTIC LABORATORY Unknown [address] [facility] [id number] 12/19/14 0954 - Present    226 - Unknown MICRO RAPID TESTING LAB Unknown [address] [facility] [address] 12/19/14 0955 - Present            Unresulted Labs (24h ago through future)    Start       Ordered    10/10/17 0600  INR  (warfarin (COUMADIN) Pharmacy  "Consult-INITIAL ORDER)  DAILY,   Routine      10/09/17 1708    Unscheduled  Glucose - Diabetes  CONDITIONAL X 1 STAT,   STAT     Comments:  for changes in mental status, diaphoresis, or unexplained tachycardia    10/02/17 0459    Unscheduled  Magnesium  (Magnesium Replacement -  Adult - \"Standard\" - Replacement for all levels less than 1.6 mg/dL )  CONDITIONAL (SPECIFY),   Routine     Comments:  Obtain Magnesium Level for these conditions:  *IF no magnesium result within 24 hrs before initiation of order set, draw magnesium level with next lab collect.    *2 HOURS AFTER last magnesium replacement dose when magnesium replacement given for level less than 1.6   *Next morning after magnesium dose.     Repeat Magnesium Replacement if necessary.    10/09/17 0605    Unscheduled  Phosphorus  (POTASSIUM Phosphate - \"Standard\" - Replacement for levels less than or equal to 2.4 mg/dL )  CONDITIONAL (SPECIFY),   Routine     Comments:  Obtain Phosphorus Level for these conditions:  *IF no phosphorus result within 24 hrs before initiation of order set, draw phosphorus level with next lab collect.    *2 HOURS AFTER last phosphorus replacement dose for levels less than 2.0.  *Next morning after phosphorus dose.     Repeat Phosphorus Replacement if necessary.    10/09/17 0605    Unscheduled  Hepatitis B surface antigen  CONDITIONAL X 1,   Routine     Comments:  Pre-dialysis, Renal Dialysis RN to release lab and draw lab if Hepatitis B status is unknown.    10/18/17 0709    Unscheduled  Hepatitis B Surface Antibody  CONDITIONAL X 1,   Routine     Comments:  Pre-dialysis, Renal Dialysis RN to release lab and draw lab if Hepatitis B status is unknown.    10/18/17 0709         ECG/EMG Results      Echo Complete with Lumason [[id number]]  Resulted: 10/03/17 1137, Result status: Edited Result - FINAL    Ordering provider: Dwaine Tabor MD  10/03/17 1027 Resulted by: Micheal Hernandez MD    Performed: 10/03/17 1201 - " 10/03/17 1202 Resulting lab: RADIOLOGY RESULTS    Narrative:       [id number]  [id number]  [id number]  [id number]^NABIL^YUVAL^SHIRA           [facility]  Echocardiography Laboratory  [address] [place] [address]        Name: MARI HERNANDEZ  MRN: [id number]  : 1940  Study Date: 10/03/2017 11:37 AM  Age: 77 yrs  Gender: Female  Patient Location: [facility]  Reason For Study: Heart Failure  Ordering Physician: YUVAL FERRER  Referring Physician: OMA RIVAS  Performed By: Alesia Soto RDCS     BSA: 1.9 m2  Height: 64 in  Weight: 177 lb  HR: 86  BP: 105/66 mmHg  _____________________________________________________________________________  __        Procedure  Complete Echo Adult. Contrast Lumason.  _____________________________________________________________________________  __        Interpretation Summary     The visual ejection fraction is estimated at 60-65%.  Normal left ventricular wall motion  Left ventricular diastolic function is indeterminate.  The right ventricle is mild to moderately dilated.  The right ventricular systolic function is mild to moderately reduced.  The left atrium is moderately dilated.  The right ventricular systolic pressure is approximated at 42mmHg plus the  right atrial pressure.  Normal IVC (1.5-2.5cm) with <50% respiratory collapse; right atrial pressure  is estimated at 10-15mmHg.  Moderate (46-55mmHg) pulmonary hypertension is present.  Moderate left pleural effusion  Sinus rhythm was noted.  Compared to prior study, there is no significant change.  _____________________________________________________________________________  __        Left Ventricle  The left ventricle is normal in size. There is normal left ventricular wall  thickness. The visual ejection fraction is estimated at 60-65%. Left  ventricular diastolic function is indeterminate. e' not recorded. Normal left  ventricular wall motion.     Right Ventricle  The right ventricle is mild  to moderately dilated. The right ventricular  systolic function is mild to moderately reduced.     Atria  The left atrium is moderately dilated. Right atrial size is normal. There is  no color Doppler evidence of an atrial shunt.     Mitral Valve  There is moderate to severe mitral annular calcification. The mitral papillary  muscle appears thickened and/or calcified. There is trace to mild mitral  regurgitation. There is no mitral valve stenosis.        Tricuspid Valve  The tricuspid valve is normal in structure and function. There is mild to  moderate (1-2+) tricuspid regurgitation. The right ventricular systolic  pressure is approximated at 42mmHg plus the right atrial pressure. Normal IVC  (1.5-2.5cm) with <50% respiratory collapse; right atrial pressure is estimated  at 10-15mmHg. Moderate (46-55mmHg) pulmonary hypertension is present.     Aortic Valve  The aortic valve is trileaflet with aortic valve sclerosis. There is severe  aortic sclerosis of the non-coronary cusp. There is moderate aortic sclerosis  of the right coronary cusp. There is trace aortic regurgitation. No aortic  stenosis is present.     Pulmonic Valve  The pulmonic valve is not well seen, but is grossly normal. There is trace  pulmonic valvular regurgitation.     Vessels  The aortic root is normal size. Normal size ascending aorta. The IVC is  dilated and fails to change with respiration, suggesting elevated central  venous pressure.     Pericardium  There is no pericardial effusion. Moderate left pleural effusion.        Rhythm  Sinus rhythm was noted.  _____________________________________________________________________________  __  MMode/2D Measurements & Calculations  IVSd: 0.88 cm     LVIDd: 4.4 cm  LVIDs: 3.0 cm  LVPWd: 1.1 cm  FS: 31.1 %  EDV(Teich): 86.3 ml  ESV(Teich): 35.3 ml  LV mass(C)d: 142.3 grams  LV mass(C)dI: 76.6 grams/m2  Ao root diam: 3.0 cm  LA dimension: 4.4 cm  asc Aorta Diam: 3.4 cm  LA/Ao: 1.5  LVOT diam: 2.1  cm  LVOT area: 3.5 cm2  LA Volume (BP): 82.0 ml  LA Volume Index (BP): 44.1 ml/m2           Doppler Measurements & Calculations  PA acc time: 0.07 sec  TR max micah: 323.0 cm/sec  TR max P.7 mmHg           _____________________________________________________________________________  __           Report approved by: Adams Zhu 10/03/2017 01:49 PM       1    Type Source Collected On     10/03/17 1137          View Image (below)        Echocardiogram Complete [[id number]]  Resulted: 10/03/17 120, Result status: In process    Ordering provider: Dwaine Tabor MD  10/03/17 1027 Performed: 10/03/17 120 - 10/03/17 1201    Resulting lab: [facility]             Echo Limited with Lumason [[id number]]  Resulted: 10/03/17 1201, Result status: In process    Ordering provider: Dwaine Tabor MD  10/03/17 1027 Performed: 10/03/17 120 - 10/03/17 1201    Resulting lab: [facility]                   Encounter-Level Documents:     There are no encounter-level documents.      Order-Level Documents:     There are no order-level documents.

## 2017-10-02 NOTE — IP AVS SNAPSHOT
[facility] Cardiac Specialty Care    [address] [place] [address], [address]    [place] [address]    Phone:  [phone]                                       After Visit Summary   10/2/2017    Kathryn Banks    MRN: [id number]           After Visit Summary Signature Page     I have received my discharge instructions, and my questions have been answered. I have discussed any challenges I see with this plan with the nurse or doctor.    ..........................................................................................................................................  Patient/Patient Representative Signature      ..........................................................................................................................................  Patient Representative Print Name and Relationship to Patient    ..................................................               ................................................  Date                                            Time    ..........................................................................................................................................  Reviewed by Signature/Title    ...................................................              ..............................................  Date                                                            Time

## 2017-10-02 NOTE — CONSULTS
RENAL CONSULTATION NOTE    REFERRING MD: Rashard Millan MD     REASON FOR CONSULTATION:  CKD V with acidosis and hyperkalemia    HPI:  77 y.o woman with CAD s/p CABG x 5, CKD V, hypertension and lymphedema, who was transferred from [facility]. Pt fell 5 days ago. She presented to the ER for the fall. Xrays showed fracture interior scapula and LLL. She was advised inpatient treatment, but she wanted to go home. She was discharged with Levaquin, only to came back with worsening cough. She was then diagnosed with sepsis presumed from PNA. She was started on IV antibiotics, and it looks like she was given two liters of IVF. Then she was transferred her for further management.     Currently, she is on NE at 0.16 mcg. SBP is in the 80s.     Patient looks ill, but she is able to answer some questions appropriately. He daughter is at the bedside.     She states that that she was doing okay the week prior to admission. Though, she was feeling weaker, she had normal appetite. She denies nausea and vomiting. She has some diarrhea.     ROS:  A complete review of systems was performed and is negative except as noted above.    PMH:    Past Medical History:   Diagnosis Date     Carpal tunnel syndrome      Coronary atherosclerosis of native coronary artery 2006    5 vessel CABG     OBSTRUCTIVE SLEEP APNEA     using CPAP     Osteoarthrosis, unspecified whether generalized or localized, unspecified site     generalized arthritis, particularly in her hands and feet         Other and combined forms of senile cataract 2000    Bilateral     Other and unspecified hyperlipidemia      RESTLESS LEGS SYNDROME      TENOSYNOV HAND/WRIST NEC 6/30/2006     Type II or unspecified type diabetes mellitus without mention of complication, not stated as uncontrolled 2001    Diabetes mellitus/pt is diet controlled with weight loss     Typical atrial flutter (H) 01/17/2007    ablation 6/7/2017     Unspecified essential  hypertension        PSH:    Past Surgical History:   Procedure Laterality Date     ANGIOPLASTY       C APPENDECTOMY       C CABG, VEIN, FIVE      SVG x 4 and LIMA to LAD     C SOTO W/O FACETEC FORAMOT/DSKC  VRT SEG, LUMBAR  1968     C REMV CATARACT INTRACAP,INSERT LENS      Bilateral     C TOTAL ABDOM HYSTERECTOMY       - fibroids     C VAGOTOMY/PYLOROPLASTY,SELECT  1970     COLONOSCOPY  12/3/2007     COLONOSCOPY  2014    Procedure: COLONOSCOPY;  Colonoscopy;  Surgeon: Zack Stearns MD;  Location:  GI     CYSTOSCOPY  09    [facility]     ENDOSCOPY  2000    Upper GI     HC COLONOSCOPY THRU STOMA, DIAGNOSTIC  10/7/04    poor prep, repeat in 2-3 years     HC COLONOSCOPY W/WO BRUSH/WASH  10/31/07    Repeat-poor quality prep     HC REMOVAL OF OVARY/TUBE(S)      Salpingo-Oophorectomy, Unilateral     HC REVISE MEDIAN N/CARPAL TUNNEL SURG      Carpal tunnel release     HC UGI ENDOSCOPY DIAG W BIOPSY  10/31/07     HC UGI ENDOSCOPY, SIMPLE EXAM  12/3/2007     OPEN REDUCTION INTERNAL FIXATION HIP NAILING Left 7/3/2016    Procedure: OPEN REDUCTION INTERNAL FIXATION HIP NAILING;  Surgeon: Lake Schulz MD;  Location:  OR       MEDICATIONS:      piperacillin-tazobactam  2.25 g Intravenous Q6H     vancomycin place maki - receiving intermittent dosing  1 each Does not apply See Admin Instructions     vancomycin  250 mg Oral 4x Daily       ALLERGIES:    Allergies as of 10/01/2017 - Review Complete 10/01/2017   Allergen Reaction Noted     Ciprofloxacin Nausea and Vomiting 2016     Oxycodone Visual Disturbance 2016     Lisinopril Cough 10/11/2007     Sulfa drugs GI Disturbance 2001       FH:    Family History   Problem Relation Age of Onset     DIABETES Father      AODM     Neurologic Disorder Father      Parkinson's     Blood Disease Father       from blood clot from leg to lung immediately after hip fracture     DIABETES Paternal Grandmother      adult onset      DIABETES Maternal Grandmother      adult onset     Hypertension No family hx of      CEREBROVASCULAR DISEASE No family hx of        SH:    Social History     Social History     Marital status:      Spouse name: Urbano Banks     Number of children: 2     Years of education: 13     Occupational History     Ministry coordinator      [employer]     Social History Main Topics     Smoking status: Former Smoker     Years: 10.00     Quit date: 1/1/1969     Smokeless tobacco: Never Used     Alcohol use 0.0 oz/week     0 Standard drinks or equivalent per week      Comment: 1/2 a beer once a month     Drug use: No     Sexual activity: Yes     Birth control/ protection: Surgical     Other Topics Concern     Parent/Sibling W/ Cabg, Mi Or Angioplasty Before 65f 55m? No     Social History Narrative       PHYSICAL EXAM:    /77  Temp 92.6  F (33.7  C) (Axillary)  Resp 19  Wt 80 kg (176 lb 5.9 oz)  SpO2 92%  BMI 30.27 kg/m2  GENERAL: Looks tired and weak.  HEENT:  Normocephalic. No gross abnormalities.  Lips are dry.  CV: RRR, no murmurs, no clicks, gallops, or rubs, 1+edema at the thigh  RESP: lungs sound junky. BS diminish left lung base.  GI: Abdomen obese, soft, NT  MUSCULOSKELETAL: Bilateral with ulcers. Legs are wrapped.  SKIN: B/L ext leg ulcers  NEURO:  Awake and alert.   LYMPH: No palpable ant/post cervical     LABS:      CBC RESULTS:     Recent Labs  Lab 10/01/17  2145   WBC 6.7   RBC 3.13*   HGB 8.1*   HCT 26.6*          BMP RESULTS:    Recent Labs  Lab 10/02/17  0400 10/01/17  2145   NA  --  145*   POTASSIUM 5.5* 5.6*   CHLORIDE  --  120*   CO2  --  11*   BUN  --  60*   CR  --  5.23*   GLC  --  323*   MALICK  --  7.0*       INR  Recent Labs  Lab 10/02/17  0400 09/28/17   INR 5.71* 2.2        DIAGNOSTICS:  Reviewed    A/P:  77 y.o woman with CAD s/p CABG x 5, HTN and CKD V, who is admitted for sepsis presume due to PNA, consulted for worsening kidney function.     1. CKD  V. Her nephrologist has started the process for access and plan for renal renal replacement in the near future. Her kidney function is getting worse in the setting of sepsis. She has acidosis from advanced renal failure and mild hyperkalemia. She may need RRT, but currently, there is no urgent indications to start CRRT. Her SBP is in the 80s with NE at 0.16 mcg. ICU team will continue further resuscitation and try to improve SBP. Repeat labs are scheduled.     2. Septic shock presumed due to PNA. She also has UTI.    -NE at 0.15 mcg   -she is getting 250 cc of 5% albumin    -keep MAP ~65-70     3. Acidosis, mostly due to renal failure   -sodium bicarbonate ordered     4. 1+ peripheral edema with some vascular congestion on CXR. Patient may need to be intubated if her respiratory status deteriorate with fluid resuscitation.   -TTE in August showed EF 60-65 with moderate reduction in RV function    5. Mild hyperkalemia.    -continue to monitor for now    6. Elevated INR.    -got vitK   -no evidence of bleeding     Plan.   1. Consider more aggressive resuscitation for sepsis.   2. Continue to follow serum bicarb and pH.   3. Agree with 5% albumin. She may need a couple more doses.  4. 1 amp of sodium bicarbonate stat, then increase bicarb gtt to 100 cc/hr  5. Keep MAP ~ 65-70  6. May need to initiate RRT during this admission, but not urgently today though, unless we can't control her acidosis and hyperkalemia. I suspect pH and K will improve with medical management. She may need to be intubated if her respiratory condition declines. I discussed her case with the ICU team.    Davey Palma MD  [facility] - Nephrology  Office Phone: [phone]  Pager: [phone]

## 2017-10-02 NOTE — PLAN OF CARE
Problem: Patient Care Overview  Goal: Individualization & Mutuality     Patient was admitted from [facility] with acidosis, CKD, respiratory distress/PNA on 5L face mask, now in sepsis requiring levophed 0.16mcg/min to keep MAP >65, /77  Resp 19  Wt 80 kg (176 lb 5.9 oz)  SpO2 92%  BMI 30.27 kg/m2, pH 7.03 , given sodium bicarb IV push and lasix, only has made 60cc of urine since moreira placed in [facility] ED, central IJ, insulin gtt initiated 1.5U/hour, pain in bilateral LE venous status ulcers, wrapped in kerlix with vasoline gauze, oozing serous drainage, K+ 5.5. Family bedside. Full Code as of today.

## 2017-10-02 NOTE — PHARMACY-ADMISSION MEDICATION HISTORY
Admission medication history interview status for the 10/2/2017  admission is complete. See EPIC admission navigator for prior to admission medications     Medication history source reliability: Moderate    Actions taken by pharmacist (provider contacted, etc): spoke to Leona home care RN, [phone]     Additional medication history information not noted on PTA med list :   October 2, 2017 PTA med list reviewed with home care RN, Leona [phone].  She does not have record of the following being taken in the home: Flonase nasal spray, tramadol, DuoNeb, oral vanco, lactobacillus- I left them on the list but left them unchecked.      Medication reconciliation/reorder completed by provider prior to medication history? No    Time spent in this activity: 25 minutes    Prior to Admission medications    Medication Sig Last Dose Taking? Auth Provider   sodium bicarbonate 650 MG tablet TAKE ONE TABLET BY MOUTH THREE TIMES DAILY  Yes Dheeraj Jj MD   darbepoetin hira (ARANESP, ALBUMIN FREE,) 60 MCG/0.3ML injection Inject 0.3 mLs (60 mcg) Subcutaneous every 14 days As needed for hgb<10g/dL.  If Hgb increases >1 point in 2 weeks (if blood transfusion given, use hgb PRIOR to this), SYSTOLIC BP > 180 mmHg or hgb>=10g/dL, HOLD DOSE. Dose must be within 1 week of Hgb.  Per anemia protocol with Vibha Barfield MD/Yesy Samaniego,PharmD [phone]  Yes Vibha Barfield MD   ferrous sulfate (IRON) 325 (65 FE) MG tablet Take 1 tablet (325 mg) by mouth daily (with breakfast)  Patient taking differently: Take 325 mg by mouth 2 times daily   Yes Vibha Barfield MD   pramipexole (MIRAPEX) 0.125 MG tablet Take 1 tablet (0.125 mg) by mouth 3 times daily  Yes Mike Irene MD   torsemide (DEMADEX) 20 MG tablet Take 2 tablets (40 mg) by mouth 2 times daily  Yes Mike Irene MD   Warfarin Sodium (COUMADIN PO) Take by mouth daily Plan was: 2 mg 9/28, 9/29, 10/1  1 mg 9/30    Take as directed per INR results   Yes Reported, Patient   guaiFENesin-dextromethorphan (ROBITUSSIN DM) 100-10 MG/5ML syrup Take 5 mLs by mouth every 8 hours as needed for cough or congestion prn med Yes Mike Tadeo MD   calcitRIOL (ROCALTROL) 0.5 MCG capsule Take 1 capsule (0.5 mcg) by mouth daily  Yes Mike Tadeo MD   Calcium Carb-Cholecalciferol 500-400 MG-UNIT TABS Take 1 tablet by mouth 2 times daily  Yes Unknown, Entered By History   ACETAMINOPHEN PO Take 650 mg by mouth every 4 hours as needed for pain For pain 1-5 out of 10 prn med Yes Reported, Patient   cyanocobalamin (VITAMIN B12) 1000 MCG/ML injection Inject 1 mL into the muscle every 30 days  Yes Reported, Patient   aspirin 81 MG tablet Take 1 tablet (81 mg) by mouth daily  Yes Yareli Lorenzo MD   nitroGLYcerin (NITROSTAT) 0.4 MG sublingual tablet Place 1 tablet (0.4 mg) under the tongue every 5 minutes as needed for chest pain prn med Yes Yareli Lorenzo MD   gabapentin (NEURONTIN) 300 MG capsule Take 1 capsule (300 mg) by mouth At Bedtime  Yes Yareli Lorenzo MD   pravastatin (PRAVACHOL) 40 MG tablet Take 1 tablet (40 mg) by mouth daily  Yes Yareli Lorenzo MD   metoprolol (LOPRESSOR) 50 MG tablet Take 0.5 tablets (25 mg) by mouth 2 times daily  Yes Yareli Lorenzo MD   amLODIPine (NORVASC) 5 MG tablet Take 2 tablets (10 mg) by mouth daily  Yes Yareli Lorenzo MD   calcium acetate (PHOSLO) 667 MG CAPS capsule Take 1 capsule (667 mg) by mouth 3 times daily (with meals)  Patient taking differently: Take 1,334 mg by mouth 3 times daily (with meals)   Yes Yareli Lorenzo MD   pentoxifylline (TRENTAL) 400 MG CR tablet Take 1 tablet (400 mg) by mouth daily  Yes Yareli Lorenzo MD   levofloxacin (LEVAQUIN) 500 MG tablet Take 1 tablet (500 mg) by mouth every other day for 5 doses   Karan Dallas MD   vancomycin (VANOCIN) 50 mg/mL LIQD solution Take 2.5 mLs (125 mg) by mouth 4 times daily for 14 days   Helen Plasencia,  APRN CNP   fluticasone (FLONASE) 50 MCG/ACT spray Spray 1 spray into both nostrils daily   Mike Tadeo MD   ipratropium - albuterol 0.5 mg/2.5 mg/3 mL (DUONEB) 0.5-2.5 (3) MG/3ML neb solution Take 1 vial by nebulization 3 times daily   Unknown, Entered By History   Lactobacillus (ACIDOPHILUS) tablet Take 1 tablet by mouth daily   Lake Pierre MD   ORDER FOR DME Equipment being ordered: cpap machine, mask, humidifier, tubing and filters   Dheeraj Jj MD Beth Mulder, PharmD

## 2017-10-02 NOTE — IP AVS SNAPSHOT
"[facility] CARDIAC SPECIALTY CARE: [phone]                                              INTERAGENCY TRANSFER FORM - PHYSICIAN ORDERS   10/2/2017                    Hospital Admission Date: 10/2/2017  MARI HERNANDEZ   : 1940  Sex: Female        Attending Provider: Rashard Millan MD     Allergies:  Ciprofloxacin, Oxycodone, Lisinopril, Sulfa Drugs    Infection:  None   Service:  HOSPITALIST    Ht:  1.626 m (5' 4\")   Wt:  62.2 kg (137 lb 2 oz)   Admission Wt:  80 kg (176 lb 5.9 oz)    BMI:  23.54 kg/m 2   BSA:  1.68 m 2            Patient PCP Information     Provider PCP Type    Dheeraj Jj MD General      ED Clinical Impression     Diagnosis Description Comment Added By Time Added    CKD (chronic kidney disease) stage 5, GFR less than 15 ml/min (H) [N18.5] CKD (chronic kidney disease) stage 5, GFR less than 15 ml/min (H)  Hank Babcock MD 10/18/2017  9:18 AM    Anxiety [F41.9] Anxiety  Hank Babcock MD 10/18/2017  9:24 AM    Atrial fibrillation with rapid ventricular response (H) [I48.91] Atrial fibrillation with rapid ventricular response (H)  Hank Babcock MD 10/18/2017  9:30 AM      Hospital Problems as of 10/18/2017              Priority Class Noted POA    Septic shock (H) Medium  10/2/2017 Yes      Non-Hospital Problems as of 10/18/2017              Priority Class Noted    Sleep apnea Medium  Unknown    Restless leg syndrome Low  Unknown    Lipoma of other skin and subcutaneous tissue Medium  2004    ESOPHAGEAL REFLUX Medium  3/28/2006    Postsurgical aortocoronary bypass status Low  2007    Iron deficiency anemia Medium  10/17/2007    History of peptic ulcer disease Low  10/17/2007    Edema Medium  2008    Kidney stone Medium  2009    Hyperlipidemia LDL goal <100 Medium  10/31/2010    Advanced directives, counseling/discussion Low  2012    CAD - NSTEMI, CABG x 5 , angio in  with patent grafts other " than occluded graft to PL Medium  2/4/2013    Hx of non-ST elevation myocardial infarction (NSTEMI) Medium  2/4/2013    Health Care Home Medium  1/20/2014    Lymphedema Medium  11/10/2014    Anemia of chronic renal failure, stage 4 (severe) (H) Medium  11/10/2014    Renal failure Medium  3/23/2015    Osteoarthritis of hip Medium  4/29/2015    Non morbid obesity, unspecified obesity type Medium  11/1/2015    Type 2 diabetes mellitus with other circulatory complications Medium  1/19/2016    Long-term (current) use of anticoagulants [Z79.01] Medium  3/4/2016    Essential hypertension with goal blood pressure less than 140/90 Medium  9/13/2016    Atrial fibrillation with rapid ventricular response (H) Medium  4/28/2017    Rash - redness medial thighs Medium  4/29/2017    Metabolic acidosis, normal anion gap (NAG) Medium  4/30/2017    Pulmonary hypertension Medium  5/24/2017    Chronic heart failure (H) with preserved EF Medium  5/31/2017    Atrial flutter (H) - present 6/12 Medium  5/31/2017    Generalized edema - anasarca Medium  6/1/2017    Hypertensive heart and chronic kidney disease with heart failure and stage 1 through stage 4 chronic kidney disease, or unspecified chronic kidney disease Medium  6/1/2017    Supratherapeutic INR Medium  6/1/2017    Proteinuria 2.1 g/g Cr Medium  6/2/2017    Bradycardia Medium  6/2/2017    Acute on chronic renal failure (H) Medium  6/12/2017    Memory loss Medium  6/12/2017    Chronic atrial fibrillation (H) on 6/12-6/13 Medium  6/12/2017    Blood in stool Medium  6/14/2017    Microscopic hematuria Medium  6/21/2017    Acute kidney injury (nontraumatic) (H) Medium  6/21/2017    CHF (congestive heart failure) (H) Medium  7/6/2017    Anemia Medium  7/19/2017    Anemia, iron deficiency Medium  8/9/2017    Acidosis Medium  8/12/2017    Pulmonary edema Medium  8/26/2017    Volume overload Medium  9/13/2017    CKD (chronic kidney disease) stage 5, GFR less than 15 ml/min (H) Medium   9/19/2017    Anemia in stage 5 chronic kidney disease (H) Medium  9/20/2017      Code Status History     Date Active Date Inactive Code Status Order ID Comments User Context    10/18/2017  9:22 AM  Full Code [id number]  Hank Babcock MD Outpatient    10/2/2017  3:17 AM 10/18/2017  9:22 AM Full Code [id number]  Beth Laird MD Inpatient    9/13/2017 10:58 PM 9/15/2017  7:27 PM Full Code [id number]  Mike Tadeo MD Inpatient    9/1/2017 11:47 AM 9/13/2017 10:58 PM Full Code [id number]  Mike Tadeo MD Outpatient    8/26/2017  9:59 AM 9/1/2017 11:47 AM Full Code [id number]  Cameron Burrell MD Inpatient    8/18/2017 11:32 AM 8/26/2017  9:59 AM Full Code [id number]  Lake Pierre MD Outpatient    8/12/2017  4:31 PM 8/18/2017 11:32 AM Full Code [id number]  Gilda Muro MD Inpatient    7/11/2017  3:54 PM 8/12/2017  4:31 PM Full Code [id number]  Damian Contreras MD Outpatient    7/6/2017 10:16 PM 7/11/2017  3:54 PM Full Code [id number]  Maegan Xavier MD Inpatient    6/21/2017  2:14 PM 6/25/2017  3:28 PM Full Code [id number]  Erin Pinedo MD Inpatient    6/15/2017  4:01 PM 6/21/2017  2:14 PM Full Code [id number]  Arash Silva MD Outpatient    6/12/2017  8:17 PM 6/15/2017  4:01 PM Full Code [id number]  Mike Wong MD Inpatient    6/6/2017  1:12 PM 6/12/2017  8:17 PM Full Code [id number]  Kenji Fish MD Outpatient    5/31/2017  9:20 PM 6/6/2017  1:12 PM Full Code [id number]  Mike Wong MD Inpatient    5/2/2017 10:00 AM 5/31/2017  9:20 PM Full Code [id number]  Jian Hartley MD Outpatient    4/28/2017  7:16 PM 5/2/2017 10:00 AM Full Code [id number]  Jian Hartley MD Inpatient    7/6/2016  1:46 PM 4/28/2017  7:16 PM Full Code [id number]  Isadora Mosley PA-C Outpatient    7/6/2016 10:41 AM 7/6/2016  1:46 PM Full Code [id number]  Arash Silva MD Outpatient    7/3/2016 11:12 AM 7/6/2016 10:41 AM Full Code [id number]  Isadora Mosley PA-C Inpatient     7/2/2016  5:08 AM 7/3/2016 11:12 AM Full Code [id number]  Mike Wong MD Inpatient    3/23/2015  7:40 PM 3/25/2015  1:40 PM Full Code [id number]  Luisa Adan MD Inpatient    12/28/2014  9:42 AM 3/23/2015  7:40 PM Full Code [id number]  Jian Hartley MD Outpatient    12/27/2014  4:26 PM 12/28/2014  9:42 AM Full Code [id number]  Mick Dale MD ED    12/9/2014  2:02 PM 12/27/2014  4:26 PM Full Code [id number]  Jian Hartley MD Outpatient    11/13/2014 11:04 AM 12/9/2014  2:02 PM Full Code [id number]  Jian Hartley MD Outpatient    11/10/2014  6:44 PM 11/13/2014 11:04 AM Full Code [id number]  Mike Wong MD Inpatient    9/14/2014  1:12 PM 11/10/2014  6:44 PM Full Code [id number]  Jian Hartley MD Outpatient    9/12/2014  1:02 PM 9/14/2014  1:12 PM Full Code [id number]  Radha Field PA-C Inpatient    7/20/2014  9:02 AM 9/12/2014  1:02 PM Full Code [id number]  Cesar Lafleur MD Outpatient    7/15/2014  5:49 PM 7/20/2014  9:02 AM Full Code [id number]  Radha Field PA-C Inpatient    2/22/2014  4:21 AM 2/22/2014  2:33 PM Full Code [id number]  Mike Ogden MD ED    1/17/2014  8:18 PM 1/18/2014 12:34 PM Full Code [id number]  Judson Gan RN Inpatient    2/1/2013  2:01 PM 1/17/2014  8:18 PM Full Code [id number]  Nadira Mccann MD Outpatient    2/1/2013 12:18 AM 2/1/2013  2:01 PM Full Code [id number]  Rajinder Dwyer MD Inpatient    1/31/2013  9:39 PM 2/1/2013 12:18 AM Full Code [id number]  Arash Silva MD Outpatient    1/31/2013  7:09 PM 1/31/2013  9:39 PM Full Code [id number]  Radha Field PA-C Inpatient    7/10/2008  2:44 PM 1/31/2013  7:09 PM None None HEALTH CARE DIRECTIVE ON FILE 7-3-08 Melina [employer]         Medication Review      START taking        Dose / Directions Comments    * amiodarone HCl 400 MG Tabs   Used for:  Atrial fibrillation with rapid ventricular response (H)        Dose:  400 mg   Take 400 mg by mouth 2  times daily for 6 days   Quantity:  12 tablet   Refills:  0        * amiodarone 200 MG tablet   Commonly known as:  PACERONE/CODARONE   Used for:  Atrial fibrillation with rapid ventricular response (H)        Dose:  200 mg   Start taking on:  10/24/2017   Take 1 tablet (200 mg) by mouth daily   Quantity:  30 tablet   Refills:  1        cholecalciferol 5000 UNITS Caps   Used for:  CKD (chronic kidney disease) stage 5, GFR less than 15 ml/min (H)        Dose:  5000 Units   Take 1 capsule (5,000 Units) by mouth daily   Refills:  3        clonazePAM 0.5 MG tablet   Commonly known as:  klonoPIN   Used for:  Anxiety        Dose:  0.25 mg   Take 0.5 tablets (0.25 mg) by mouth 2 times daily as needed for anxiety   Quantity:  14 tablet   Refills:  0        doxercalciferol 4 MCG/2ML Soln injection   Commonly known as:  HECTOROL   Used for:  CKD (chronic kidney disease) stage 5, GFR less than 15 ml/min (H)        Dose:  2 mcg   Inject 1 mL (2 mcg) into the vein See Admin Instructions   Quantity:  25.5 mL   Refills:  1        * Notice:  This list has 2 medication(s) that are the same as other medications prescribed for you. Read the directions carefully, and ask your doctor or other care provider to review them with you.      CONTINUE these medications which may have CHANGED, or have new prescriptions. If we are uncertain of the size of tablets/capsules you have at home, strength may be listed as something that might have changed.        Dose / Directions Comments    calcium acetate 667 MG Caps capsule   Commonly known as:  PHOSLO   This may have changed:  how much to take   Used for:  Chronic kidney disease, unspecified CKD stage        Dose:  667 mg   Take 1 capsule (667 mg) by mouth 3 times daily (with meals)   Quantity:  180 capsule   Refills:  0        warfarin 1 MG tablet   Commonly known as:  COUMADIN   This may have changed:    - how much to take  - additional instructions   Used for:  Atrial fibrillation with rapid  ventricular response (H)        Dose:  4 mg   Take 4 tablets (4 mg) by mouth daily for 2 days   Quantity:  8 tablet   Refills:  0    Will need to re-evaluate the dose based on INR values daily and dose as accordingly.         CONTINUE these medications which have NOT CHANGED        Dose / Directions Comments    ACETAMINOPHEN PO        Dose:  650 mg   Take 650 mg by mouth every 4 hours as needed for pain For pain 1-5 out of 10   Refills:  0        acidophilus tablet   Used for:  Urinary tract infection without hematuria, site unspecified, Cellulitis of lower extremity, unspecified laterality        Dose:  1 tablet   Take 1 tablet by mouth daily   Refills:  0        Calcium Carb-Cholecalciferol 500-400 MG-UNIT Tabs        Dose:  1 tablet   Take 1 tablet by mouth 2 times daily   Refills:  0        cyanocobalamin 1000 MCG/ML injection   Commonly known as:  VITAMIN B12        Dose:  1 mL   Inject 1 mL into the muscle every 30 days   Refills:  0        darbepoetin hira 60 MCG/0.3ML injection   Commonly known as:  ARANESP (ALBUMIN FREE)   Used for:  Anemia in stage 5 chronic kidney disease (H), CKD (chronic kidney disease) stage 5, GFR less than 15 ml/min (H)        Dose:  60 mcg   Inject 0.3 mLs (60 mcg) Subcutaneous every 14 days As needed for hgb<10g/dL.  If Hgb increases >1 point in 2 weeks (if blood transfusion given, use hgb PRIOR to this), SYSTOLIC BP > 180 mmHg or hgb>=10g/dL, HOLD DOSE. Dose must be within 1 week of Hgb.  Per anemia protocol with Vibha Barfield MD/Yesy Samaniego,PharmD [phone]   Quantity:  0.3 mL   Refills:  99        fluticasone 50 MCG/ACT spray   Commonly known as:  FLONASE   Used for:  Nasal congestion        Dose:  1 spray   Spray 1 spray into both nostrils daily   Quantity:  1 Bottle   Refills:  0        ipratropium - albuterol 0.5 mg/2.5 mg/3 mL 0.5-2.5 (3) MG/3ML neb solution   Commonly known as:  DUONEB        Dose:  1 vial   Take 1 vial by nebulization 3 times daily   Refills:  0         metoprolol 50 MG tablet   Commonly known as:  LOPRESSOR   Used for:  Atrial fibrillation with rapid ventricular response (H)        Dose:  25 mg   Take 0.5 tablets (25 mg) by mouth 2 times daily   Quantity:  180 tablet   Refills:  3        nitroGLYcerin 0.4 MG sublingual tablet   Commonly known as:  NITROSTAT   Used for:  Hx of non-ST elevation myocardial infarction (NSTEMI), Atherosclerosis of native coronary artery of native heart without angina pectoris, Postsurgical aortocoronary bypass status        Dose:  0.4 mg   Place 1 tablet (0.4 mg) under the tongue every 5 minutes as needed for chest pain   Quantity:  25 tablet   Refills:  0        order for DME   Used for:  ANABEL (obstructive sleep apnea)        Equipment being ordered: cpap machine, mask, humidifier, tubing and filters   Quantity:  1 Device   Refills:  0        pentoxifylline 400 MG CR tablet   Commonly known as:  TRENtal   Used for:  Venous stasis ulcer (H)        Dose:  400 mg   Take 1 tablet (400 mg) by mouth daily   Refills:  0        pramipexole 0.125 MG tablet   Commonly known as:  MIRAPEX   Used for:  Restless leg syndrome        Dose:  0.125 mg   Take 1 tablet (0.125 mg) by mouth 3 times daily   Quantity:  90 tablet   Refills:  3        pravastatin 40 MG tablet   Commonly known as:  PRAVACHOL   Used for:  Hx of non-ST elevation myocardial infarction (NSTEMI), Atherosclerosis of native coronary artery of native heart without angina pectoris, Type 2 diabetes mellitus with other circulatory complications        Dose:  40 mg   Take 1 tablet (40 mg) by mouth daily   Quantity:  90 tablet   Refills:  3          STOP taking     amLODIPine 5 MG tablet   Commonly known as:  NORVASC           aspirin 81 MG tablet           calcitRIOL 0.5 MCG capsule   Commonly known as:  ROCALTROL           ferrous sulfate 325 (65 FE) MG tablet   Commonly known as:  IRON           gabapentin 300 MG capsule   Commonly known as:  NEURONTIN            guaiFENesin-dextromethorphan 100-10 MG/5ML syrup   Commonly known as:  ROBITUSSIN DM           levofloxacin 500 MG tablet   Commonly known as:  LEVAQUIN           sodium bicarbonate 650 MG tablet           torsemide 20 MG tablet   Commonly known as:  DEMADEX           vancomycin 50 mg/mL Liqd solution   Commonly known as:  VANOCIN                     Further instructions from your care team           Check in at [facility] in [facility]. On Friday October 29th be there at 1030 am    Patient will discharge to [facility] today via family.  Angela Grullon phone number is [phone].    After Care     Activity - Up with nursing assistance           Advance Diet as Tolerated       Follow this diet upon discharge:      Combination Diet Dialysis Diet; 6961-6171 Calories: Moderate Consistent CHO (4-6 CHO units/meal)       Daily weights       Call Provider for weight gain of more than 2 pounds per day or 5 pounds per week.       General info for SNF       Length of Stay Estimate: Short Term Care: Estimated # of Days <30  Condition at Discharge: Improving  Level of care:skilled   Rehabilitation Potential: Good  Admission H&P remains valid and up-to-date: Yes  Recent Chemotherapy: N/A  Use Nursing Home Standing Orders: Yes       Glucose monitor nursing POCT       Before meals and at bedtime       IV access       Hemodialysis.  RACW Dialysis catheter       Intake and output       Every shift       Mantoux instructions       Give two-step Mantoux (PPD) Per Facility Policy Yes             Referrals     Occupational Therapy Adult Consult       Evaluate and treat as clinically indicated.    Reason:  Deconditioning       Physical Therapy Adult Consult       Evaluate and treat as clinically indicated.    Reason:  Deconditioning             Other Orders     Contact Isolation       Enteric             Your next 10 appointments already scheduled     Oct 24, 2017  2:00 PM CDT   LAB with NL LAB [facility]    [address] [place] [id number] [phone]           Patient must bring picture ID. Patient should be prepared to give a urine specimen  Please do not eat 10-12 hours before your appointment if you are coming in fasting for labs on lipids, cholesterol, or glucose (sugar). Pregnant women should follow their Care Team instructions. Water with medications is okay. Do not drink coffee or other fluids. If you have concerns about taking  your medications, please ask at office or if scheduling via Dr[employer], send a message by clicking on Secure Messaging, Message Your Care Team.            Oct 26, 2017  2:30 PM CDT   Core Return with Destniy Berrios NP   [facility] ([facility])    [address] [place] [address] [address]nd [facility] [address]   [phone]            Nov 02, 2017  2:00 PM CDT   (Arrive by 1:45 PM)   New Vascular Visit with Cassie Cardenas MD   [facility] ([facility])    [address]  3rd [facility] [address] [phone]            Nov 20, 2017 12:20 PM CST   LAB with LAB FIRST FLOOR [facility] ([facility])    [id number] [place] [id number] [phone]           Patient must bring picture ID. Patient should be prepared to give a urine specimen  Please do not eat 10-12 hours before your appointment if you are coming in fasting for labs on lipids, cholesterol, or glucose (sugar). Pregnant women should follow their Care Team instructions. Water with medications is okay. Do not drink coffee or other fluids. If you have concerns about taking  your medications, please ask at office or if scheduling via Drillster, send a message by clicking on Secure Messaging, Message Your Care Team.            Nov 20, 2017  1:30 PM CST   Return Visit with Vibha Barfield MD   [facility] ([facility])    [address] [place] [id number]   [phone]              Statement of Approval     Ordered          10/18/17 0931  I have reviewed and agree with all the recommendations and orders detailed in this document.  EFFECTIVE NOW     Approved and electronically signed by:  Hank Babcock MD

## 2017-10-02 NOTE — ED PROVIDER NOTES
Procedural note: Central venous line placement.    Indication: Severe sepsis requiring close hemodynamic monitoring.    Informed consent was obtained from the patient after discussing the risks and benefits of central line placement.  A timeout was performed to ensure that we have the right patient, location, and procedure.  The area was cleansed using chlorhexidine and draped using sterile fashion.  I then down again maintaining sterile environment and gloved, masked and capped.  The patient was placed in the supine position with slight reverse Trendelenburg to help distend the internal jugular vein.  This was then verified using ultrasound with sterile technique.  The area was anesthetized using 2% lidocaine requiring 3 cc instilled into the dermal and soft tissue layers.  The vein was then cannulated using a Cook needle and a guidewire was placed across into the internal jugular to the superior vena cava.  Dilator was then used to expand the tract and a triple-lumen catheter was placed across the guidewire into the vessel.  X-rays were obtained and show that the tip of the triple-lumen is actually close to the inferior vena cava in the right atrium and therefore was withdrawn by 6 cm.  Repeat x-ray after repositioning shows that the tip of the catheter is just above the right atrium.  The area was secured using 0-0 silk and covered in sterile fashion.  Patient tolerated the procedure well.     Clay Zepeda MD  10/02/17 0047

## 2017-10-03 ENCOUNTER — APPOINTMENT (OUTPATIENT)
Dept: GENERAL RADIOLOGY | Facility: CLINIC | Age: 77
DRG: 871 | End: 2017-10-03
Attending: INTERNAL MEDICINE
Payer: MEDICARE

## 2017-10-03 ENCOUNTER — APPOINTMENT (OUTPATIENT)
Dept: CARDIOLOGY | Facility: CLINIC | Age: 77
DRG: 871 | End: 2017-10-03
Attending: SURGERY
Payer: MEDICARE

## 2017-10-03 LAB
ABO + RH BLD: NORMAL
ABO + RH BLD: NORMAL
ANION GAP SERPL CALCULATED.3IONS-SCNC: 11 MMOL/L (ref 3–14)
BACTERIA SPEC CULT: NO GROWTH
BLD GP AB SCN SERPL QL: NORMAL
BLD PROD TYP BPU: NORMAL
BLD PROD TYP BPU: NORMAL
BLD UNIT ID BPU: 0
BLOOD BANK CMNT PATIENT-IMP: NORMAL
BLOOD PRODUCT CODE: NORMAL
BPU ID: NORMAL
BUN SERPL-MCNC: 37 MG/DL (ref 7–30)
CALCIUM SERPL-MCNC: 6.6 MG/DL (ref 8.5–10.1)
CHLORIDE SERPL-SCNC: 112 MMOL/L (ref 94–109)
CO2 SERPL-SCNC: 21 MMOL/L (ref 20–32)
CREAT SERPL-MCNC: 3.78 MG/DL (ref 0.52–1.04)
DEPRECATED CALCIDIOL+CALCIFEROL SERPL-MC: 11 UG/L (ref 20–75)
ERYTHROCYTE [DISTWIDTH] IN BLOOD BY AUTOMATED COUNT: 19.2 % (ref 10–15)
FERRITIN SERPL-MCNC: 376 NG/ML (ref 8–252)
GFR SERPL CREATININE-BSD FRML MDRD: 12 ML/MIN/1.7M2
GLUCOSE BLDC GLUCOMTR-MCNC: 118 MG/DL (ref 70–99)
GLUCOSE BLDC GLUCOMTR-MCNC: 134 MG/DL (ref 70–99)
GLUCOSE BLDC GLUCOMTR-MCNC: 137 MG/DL (ref 70–99)
GLUCOSE BLDC GLUCOMTR-MCNC: 141 MG/DL (ref 70–99)
GLUCOSE BLDC GLUCOMTR-MCNC: 146 MG/DL (ref 70–99)
GLUCOSE BLDC GLUCOMTR-MCNC: 154 MG/DL (ref 70–99)
GLUCOSE BLDC GLUCOMTR-MCNC: 157 MG/DL (ref 70–99)
GLUCOSE SERPL-MCNC: 141 MG/DL (ref 70–99)
HCT VFR BLD AUTO: 20.3 % (ref 35–47)
HGB BLD-MCNC: 6.3 G/DL (ref 11.7–15.7)
HGB BLD-MCNC: 6.6 G/DL (ref 11.7–15.7)
HGB BLD-MCNC: 7.5 G/DL (ref 11.7–15.7)
INR PPP: 1.82 (ref 0.86–1.14)
IRON SATN MFR SERPL: 43 % (ref 15–46)
IRON SERPL-MCNC: 38 UG/DL (ref 35–180)
Lab: NORMAL
MCH RBC QN AUTO: 26.4 PG (ref 26.5–33)
MCHC RBC AUTO-ENTMCNC: 32.5 G/DL (ref 31.5–36.5)
MCV RBC AUTO: 81 FL (ref 78–100)
NUM BPU REQUESTED: 1
PLATELET # BLD AUTO: 219 10E9/L (ref 150–450)
POTASSIUM SERPL-SCNC: 3.9 MMOL/L (ref 3.4–5.3)
PTH-INTACT SERPL-MCNC: 812 PG/ML (ref 12–72)
RBC # BLD AUTO: 2.5 10E12/L (ref 3.8–5.2)
SODIUM SERPL-SCNC: 144 MMOL/L (ref 133–144)
SPECIMEN EXP DATE BLD: NORMAL
SPECIMEN SOURCE: NORMAL
TIBC SERPL-MCNC: 89 UG/DL (ref 240–430)
TRANSFUSION STATUS PATIENT QL: NORMAL
TRANSFUSION STATUS PATIENT QL: NORMAL
VANCOMYCIN SERPL-MCNC: 13.8 MG/L
WBC # BLD AUTO: 10.8 10E9/L (ref 4–11)

## 2017-10-03 PROCEDURE — 40000264 ECHO COMPLETE WITH LUMASON

## 2017-10-03 PROCEDURE — 71010 XR CHEST PORT 1 VW: CPT

## 2017-10-03 PROCEDURE — 86923 COMPATIBILITY TEST ELECTRIC: CPT | Performed by: SURGERY

## 2017-10-03 PROCEDURE — 93306 TTE W/DOPPLER COMPLETE: CPT | Mod: 26 | Performed by: INTERNAL MEDICINE

## 2017-10-03 PROCEDURE — 25000128 H RX IP 250 OP 636: Performed by: INTERNAL MEDICINE

## 2017-10-03 PROCEDURE — 86706 HEP B SURFACE ANTIBODY: CPT | Performed by: SURGERY

## 2017-10-03 PROCEDURE — 85610 PROTHROMBIN TIME: CPT | Performed by: SURGERY

## 2017-10-03 PROCEDURE — 85018 HEMOGLOBIN: CPT | Performed by: INTERNAL MEDICINE

## 2017-10-03 PROCEDURE — A9270 NON-COVERED ITEM OR SERVICE: HCPCS | Mod: GY | Performed by: SURGERY

## 2017-10-03 PROCEDURE — 90937 HEMODIALYSIS REPEATED EVAL: CPT

## 2017-10-03 PROCEDURE — 83540 ASSAY OF IRON: CPT | Performed by: INTERNAL MEDICINE

## 2017-10-03 PROCEDURE — 86901 BLOOD TYPING SEROLOGIC RH(D): CPT | Performed by: SURGERY

## 2017-10-03 PROCEDURE — 86850 RBC ANTIBODY SCREEN: CPT | Performed by: SURGERY

## 2017-10-03 PROCEDURE — 83970 ASSAY OF PARATHORMONE: CPT | Performed by: SURGERY

## 2017-10-03 PROCEDURE — 85027 COMPLETE CBC AUTOMATED: CPT | Performed by: SURGERY

## 2017-10-03 PROCEDURE — 87340 HEPATITIS B SURFACE AG IA: CPT | Performed by: SURGERY

## 2017-10-03 PROCEDURE — 25000132 ZZH RX MED GY IP 250 OP 250 PS 637: Mod: GY | Performed by: INTERNAL MEDICINE

## 2017-10-03 PROCEDURE — 83550 IRON BINDING TEST: CPT | Performed by: INTERNAL MEDICINE

## 2017-10-03 PROCEDURE — 82728 ASSAY OF FERRITIN: CPT | Performed by: INTERNAL MEDICINE

## 2017-10-03 PROCEDURE — 82306 VITAMIN D 25 HYDROXY: CPT | Performed by: SURGERY

## 2017-10-03 PROCEDURE — 25000128 H RX IP 250 OP 636: Performed by: SURGERY

## 2017-10-03 PROCEDURE — 86900 BLOOD TYPING SEROLOGIC ABO: CPT | Performed by: SURGERY

## 2017-10-03 PROCEDURE — 85018 HEMOGLOBIN: CPT | Performed by: SURGERY

## 2017-10-03 PROCEDURE — 20000003 ZZH R&B ICU

## 2017-10-03 PROCEDURE — 40000275 ZZH STATISTIC RCP TIME EA 10 MIN

## 2017-10-03 PROCEDURE — 27210339 ZZH CIRCUIT HUMIDITY W/CPAP BIP

## 2017-10-03 PROCEDURE — 00000146 ZZHCL STATISTIC GLUCOSE BY METER IP

## 2017-10-03 PROCEDURE — 25500064 ZZH RX 255 OP 636: Performed by: INTERNAL MEDICINE

## 2017-10-03 PROCEDURE — 80048 BASIC METABOLIC PNL TOTAL CA: CPT | Performed by: SURGERY

## 2017-10-03 PROCEDURE — 94660 CPAP INITIATION&MGMT: CPT

## 2017-10-03 PROCEDURE — 99291 CRITICAL CARE FIRST HOUR: CPT | Performed by: SURGERY

## 2017-10-03 PROCEDURE — P9016 RBC LEUKOCYTES REDUCED: HCPCS | Performed by: SURGERY

## 2017-10-03 PROCEDURE — 25000132 ZZH RX MED GY IP 250 OP 250 PS 637: Mod: GY | Performed by: SURGERY

## 2017-10-03 RX ORDER — ALBUMIN, HUMAN INJ 5% 5 %
250 SOLUTION INTRAVENOUS
Status: DISCONTINUED | OUTPATIENT
Start: 2017-10-03 | End: 2017-10-03

## 2017-10-03 RX ORDER — HEPARIN SODIUM 5000 [USP'U]/.5ML
5000 INJECTION, SOLUTION INTRAVENOUS; SUBCUTANEOUS EVERY 8 HOURS
Status: DISCONTINUED | OUTPATIENT
Start: 2017-10-03 | End: 2017-10-12

## 2017-10-03 RX ORDER — HYDROMORPHONE HYDROCHLORIDE 1 MG/ML
0.2 INJECTION, SOLUTION INTRAMUSCULAR; INTRAVENOUS; SUBCUTANEOUS
Status: DISCONTINUED | OUTPATIENT
Start: 2017-10-03 | End: 2017-10-18 | Stop reason: HOSPADM

## 2017-10-03 RX ORDER — ACETAMINOPHEN 325 MG/1
325-650 TABLET ORAL EVERY 6 HOURS PRN
Status: DISCONTINUED | OUTPATIENT
Start: 2017-10-03 | End: 2017-10-18 | Stop reason: HOSPADM

## 2017-10-03 RX ADMIN — HYDROCORTISONE SODIUM SUCCINATE 50 MG: 100 INJECTION, POWDER, FOR SOLUTION INTRAMUSCULAR; INTRAVENOUS at 04:31

## 2017-10-03 RX ADMIN — ACETAMINOPHEN 650 MG: 325 TABLET, FILM COATED ORAL at 10:27

## 2017-10-03 RX ADMIN — VASOPRESSIN 2.4 UNITS/HR: 20 INJECTION INTRAVENOUS at 00:10

## 2017-10-03 RX ADMIN — Medication 250 MG: at 08:26

## 2017-10-03 RX ADMIN — PIPERACILLIN SODIUM,TAZOBACTAM SODIUM 2.25 G: 2; .25 INJECTION, POWDER, FOR SOLUTION INTRAVENOUS at 14:28

## 2017-10-03 RX ADMIN — SULFUR HEXAFLUORIDE 2 ML: KIT at 12:03

## 2017-10-03 RX ADMIN — HYDROCORTISONE SODIUM SUCCINATE 50 MG: 100 INJECTION, POWDER, FOR SOLUTION INTRAMUSCULAR; INTRAVENOUS at 22:14

## 2017-10-03 RX ADMIN — HYDROCORTISONE SODIUM SUCCINATE 50 MG: 100 INJECTION, POWDER, FOR SOLUTION INTRAMUSCULAR; INTRAVENOUS at 15:46

## 2017-10-03 RX ADMIN — SODIUM CHLORIDE 250 ML: 9 INJECTION, SOLUTION INTRAVENOUS at 12:43

## 2017-10-03 RX ADMIN — PIPERACILLIN SODIUM,TAZOBACTAM SODIUM 2.25 G: 2; .25 INJECTION, POWDER, FOR SOLUTION INTRAVENOUS at 02:07

## 2017-10-03 RX ADMIN — SODIUM CHLORIDE 250 ML: 9 INJECTION, SOLUTION INTRAVENOUS at 00:05

## 2017-10-03 RX ADMIN — VANCOMYCIN HYDROCHLORIDE 1500 MG: 5 INJECTION, POWDER, LYOPHILIZED, FOR SOLUTION INTRAVENOUS at 02:49

## 2017-10-03 RX ADMIN — HYDROCORTISONE SODIUM SUCCINATE 50 MG: 100 INJECTION, POWDER, FOR SOLUTION INTRAMUSCULAR; INTRAVENOUS at 10:12

## 2017-10-03 RX ADMIN — HEPARIN SODIUM 5000 UNITS: 5000 INJECTION, SOLUTION INTRAVENOUS; SUBCUTANEOUS at 21:07

## 2017-10-03 RX ADMIN — HEPARIN SODIUM 1500 UNITS: 1000 INJECTION, SOLUTION INTRAVENOUS; SUBCUTANEOUS at 14:23

## 2017-10-03 RX ADMIN — SODIUM CHLORIDE 500 ML: 9 INJECTION, SOLUTION INTRAVENOUS at 12:43

## 2017-10-03 RX ADMIN — HEPARIN SODIUM 1500 UNITS: 1000 INJECTION, SOLUTION INTRAVENOUS; SUBCUTANEOUS at 14:24

## 2017-10-03 RX ADMIN — PIPERACILLIN SODIUM,TAZOBACTAM SODIUM 2.25 G: 2; .25 INJECTION, POWDER, FOR SOLUTION INTRAVENOUS at 07:51

## 2017-10-03 RX ADMIN — PIPERACILLIN SODIUM,TAZOBACTAM SODIUM 2.25 G: 2; .25 INJECTION, POWDER, FOR SOLUTION INTRAVENOUS at 21:07

## 2017-10-03 RX ADMIN — Medication 250 MG: at 21:07

## 2017-10-03 RX ADMIN — HYDROMORPHONE HYDROCHLORIDE 0.2 MG: 1 INJECTION, SOLUTION INTRAMUSCULAR; INTRAVENOUS; SUBCUTANEOUS at 14:25

## 2017-10-03 RX ADMIN — Medication 250 MG: at 17:48

## 2017-10-03 NOTE — PLAN OF CARE
Problem: Patient Care Overview  Goal: Plan of Care/Patient Progress Review  Neuro: no report of hallucinations during my shift. More lethargic today before dialysis. Slept during dialysis and became less lethargic and more alert with family here. A&O x4, anxious, dilaudid x1 for back pain.   CV: normal sinus rhythm. Off pressors and pressures have been stable.   Pulm: on high flow 100%, 35 LBM .  LS diminished.   GI/: 1 run of dialysis today with 2500 pulled off. 4 soft stools today. Low urine output 15-50 an hour.   Skin: Venous stasis on legs, wound consulted (see wound care). Bandages are placed on legs.   Lines: R IJ saline locked.  Plan: continue to monitor over night.

## 2017-10-03 NOTE — PROGRESS NOTES
"SPIRITUAL HEALTH SERVICES  Spiritual Assessment Progress Note  [facility] ICU  Pt was very talkative.  She talked with  about her declining health and her fears that she may not survive this. Medical staff has reassured her that she is not in a life threatening place.  Pt stated that she needs a kidney transplant but she is unsure whether she wants to go through with it.   Pt stated that she is \"ok with dialysis and will do it until she can't stand it anymore!\"  (Pt's  had a heart transplant a number of years ago and is doing well.)     Pt has 2 children - a son and daughter and 3 grandchildren.    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  She would like to see the  and receive communion.  A Hinduism rosary was at her bedside.     listened as pt shared her concerns.   provided support and prayer.  Made referral to .     continues to be available for follow up,                                                                                                                                                        Suzette Ziegler M.Div., [facility]  Staff    Pager [phone]  "

## 2017-10-03 NOTE — PROGRESS NOTES
Potassium   Date Value Ref Range Status   10/02/2017 5.1 3.4 - 5.3 mmol/L Final   ]  Lab Results   Component Value Date    HGB 7.9 10/02/2017     Weight: 80 kg (176 lb 5.9 oz)  POST WT  DIALYSIS PROCEDURE NOTE    Patient initiated dialysis for 2 hrs on a 2 K bath with a net fluid removal of 2.0L.  A BFR of 300ml/min was obtained via R femoral temporary dialysis catheter.  Patient was seen by  during treatment.  Total heparin received during treatment:: 0 units.  Heparin instilled in both ports post run.  Meds given: Albumin prime. Complications: None.   Procedure and ESRD teaching done and questions answered.  See flowsheet in EPIC for further details and post assessment.  Machine water alarm in place and functioning.  HEPATITIS B SURFACE ANTIGEN Unknown   HEPATITIS B SURFACE ANTIBODY Unknown  CHLORINE AND CHLORAMINES CHECK on WATER SYSTEM EVERY 4 hours.   PT dialyzed at bedside in ICU  Catheter Access: Aseptic prep done for both on/off.  Report received from: KARLEE Cotton RN  Report given to: KARLEE Cotton RN

## 2017-10-03 NOTE — PROVIDER NOTIFICATION
Pt having respiratory distress, lung sounds have coarse crackles throughout (audible from the door).  Dr. Fofana notified about respiratory status and low urine output, as well as borderline blood pressure.  Plan to repeat labs and update nephrology.

## 2017-10-03 NOTE — PROGRESS NOTES
" Renal Medicine Progress Note            Assessment/Plan:     1. CKD V   -started HD 10/2    2. Infection?   -on Zosyn and vancomycin    3. Pulmonary edema. CXR still looks wet. On HF.    -TTE in August with normal EF, diastolic dysfunction, moderate RV dysfunction    4. Anemia    5. CKD-MBD. Hypocalcemia and 2HPTH    Plan  1. 2nd HD tx today. UF 2-3 liters.  2. Okay to transfuse with HD.  3. Hectorol with HD.  4. Add Fe studies. She may need IV FE.          Interval History:     \"I did not feel bad yesterday,\" she says.           Medications and Allergies:       insulin aspart  1-7 Units Subcutaneous TID AC     insulin aspart  1-5 Units Subcutaneous At Bedtime     piperacillin-tazobactam  2.25 g Intravenous Q6H     vancomycin place maki - receiving intermittent dosing  1 each Does not apply See Admin Instructions     vancomycin  250 mg Oral 4x Daily     hydrocortisone sodium succinate PF  50 mg Intravenous Q6H     - MEDICATION INSTRUCTIONS for Dialysis Patients -   Does not apply See Admin Instructions        Allergies   Allergen Reactions     Ciprofloxacin Nausea and Vomiting     Zofran did not help     Oxycodone Visual Disturbance     Delusions, blackouts      Lisinopril Cough     Sulfa Drugs GI Disturbance     LOSS OF TASTE            Physical Exam:   Vitals were reviewed  Heart Rate: 85, Blood pressure 111/60, temperature 97.7  F (36.5  C), temperature source Axillary, resp. rate 19, weight 80.4 kg (177 lb 4 oz), SpO2 95 %, not currently breastfeeding.    Wt Readings from Last 3 Encounters:   10/03/17 80.4 kg (177 lb 4 oz)   10/01/17 77.3 kg (170 lb 8 oz)   09/22/17 77.1 kg (170 lb)       Intake/Output Summary (Last 24 hours) at 10/03/17 0957  Last data filed at 10/03/17 0800   Gross per 24 hour   Intake          2637.96 ml   Output             2240 ml   Net           397.96 ml       GENERAL APPEARANCE: NAD. Looks better.  HEENT:  Eyes/ears/nose/neck grossly normal  RESP: + crackles and junky  CV: RRR, nl " S1/S2, no MGR  ABDOMEN: obese, soft, NT  EXTREMITIES/SKIN: no rashes/lesions on observed skin;  Edema up to the thighs  Neuro: a/o x3         Data:     CBC RESULTS:     Recent Labs  Lab 10/03/17  0820 10/03/17  0425 10/02/17  1440 10/02/17  1045 10/01/17  2145   WBC  --  10.8 11.6* 10.1 6.7   RBC  --  2.50* 3.01* 3.08* 3.13*   HGB 6.3* 6.6* 7.9* 8.1* 8.1*   HCT  --  20.3* 24.7* 25.5* 26.6*   PLT  --  219 300 307 257       Basic Metabolic Panel:    Recent Labs  Lab 10/03/17  0425 10/02/17  2100 10/02/17  1440 10/02/17  1045 10/02/17  0400 10/01/17  2145    146* 146* 146*  --  145*   POTASSIUM 3.9 5.1 4.9 4.8 5.5* 5.6*   CHLORIDE 112* 119* 119* 121*  --  120*   CO2 21 14* 15* 14*  --  11*   BUN 37* 61* 59* 58*  --  60*   CR 3.78* 5.24* 5.17* 5.15*  --  5.23*   * 163* 128* 72  --  323*   MALICK 6.6* 6.5* 6.5* 6.7*  --  7.0*       INR  Recent Labs  Lab 10/03/17  0425 10/02/17  2100 10/02/17  1045 10/02/17  0400   INR 1.82* 2.37* 6.00* 5.71*      Attestation:   I have reviewed today's relevant vital signs, notes, medications, labs and imaging.    Davey Palma MD  [facility]phrology  Office phone :[phone]  Pager: [phone]

## 2017-10-03 NOTE — PLAN OF CARE
Problem: Patient Care Overview  Goal: Plan of Care/Patient Progress Review  Neuro: continued to have hallucinations during shift, stated she was seeing people in her room and she knew that they weren't real. Pt stated she was seeing double and that this was nothing new. Moves all extremities.   CV: sinus rhythm with prolonged QT-MD notified. Vasopressin at 2.4 and levo at 0.18   Pulm: pneumonia, on high flow 70%, 30LBM. Crackles throughout.   GI/: Large BM this shift, 10 cc- 15 cc/hr urine output.   Skin:  Sacral pitting edema, positioned off bottom. Venous stasis sores noted on calves (see wound care).   Lines: R IJ tripple lumen, infusing.   Plan: Continue to monitor overnight.

## 2017-10-03 NOTE — PROGRESS NOTES
Patient was placed on BiPAP 12/7 80% during dialysis and she still on the BiPAP tolerating well.  FiO2 was decreased to 60% SpO2 94%Gel pad and mepilex was placed under the mask. Rt will continue to follow   10/3/2017  John Nelson

## 2017-10-03 NOTE — PHARMACY-VANCOMYCIN DOSING SERVICE
Pharmacy Vancomycin Note  Date of Service October 3, 2017  Patient's  1940   77 year old, female    Indication: [facility] Pneumonia  Goal Trough Level: 15-20 mg/L  Day of Therapy: 3  Current Vancomycin regimen:  1500 mg IV once    Current estimated CrCl = Estimated Creatinine Clearance: 9.5 mL/min (based on Cr of 5.24).    Creatinine for last 3 days  10/1/2017:  9:45 PM Creatinine 5.23 mg/dL  10/2/2017: 10:45 AM Creatinine 5.15 mg/dL;  2:40 PM Creatinine 5.17 mg/dL;  9:00 PM Creatinine 5.24 mg/dL    Recent Vancomycin Levels (past 3 days)  10/2/2017: 11:20 PM Vancomycin Level 13.8 mg/L    Vancomycin IV Administrations (past 72 hours)                   vancomycin (VANCOCIN) 1500 mg in 0.9% NaCl 250 mL PREMIX (mg) 1,500 mg New Bag 10/01/17 2312                Nephrotoxins and other renal medications (Future)    Start     Dose/Rate Route Frequency Ordered Stop    10/02/17 0900  FIRST-VANCOMYCIN 50 solution 250 mg      250 mg Oral 4 TIMES DAILY 10/02/17 0717      10/02/17 0800  piperacillin-tazobactam (ZOSYN) 2.25 g vial to attach to  ml bag      2.25 g  over 30 Minutes Intravenous EVERY 6 HOURS 10/02/17 0317      10/02/17 0338  vancomycin place maki - receiving intermittent dosing      1 each Does not apply SEE ADMIN INSTRUCTIONS 10/02/17 0339      10/02/17 033  norepinephrine (LEVOPHED) 16 mg in D5W 250 mL infusion      0.03-0.4 mcg/kg/min × 77.3 kg  2.2-29 mL/hr  Intravenous CONTINUOUS 10/02/17 0317      10/02/17 0330  vasopressin (PITRESSIN) 40 Units in D5W 40 mL infusion      2.4 Units/hr  2.4 mL/hr  Intravenous CONTINUOUS 10/02/17 0317               Contrast Orders - past 72 hours     None          Interpretation of levels and current regimen:  Trough level is  13.8    Has serum creatinine changed > 50% in last 72 hours: No    Urine output:  diminished urine output    Renal Function: ESRD on Dialysis    Plan:  1.  Continue Current Dose  2.  Pharmacy will check trough levels as  appropriate in 1-3 Days.    3. Serum creatinine levels will be ordered daily for the first week of therapy and at least twice weekly for subsequent weeks.      Vikas Osborne        .

## 2017-10-03 NOTE — PROGRESS NOTES
Renal Medicine Inpatient Dialysis Note                                Kathryn Banks MRN# [id number]   Age: 77 year old YOB: 1940   Date of Admission: 10/2/2017 Hospital LOS: 0          Assessment/Plan:     77 y.o woman with CAD s/p CABG x 5, HTN and CKD V, who is admitted for sepsis    Seen earlier today regarding advanced CKD, acute on chronic renal failure, hyperkalemia  and metabolic acidosis.  Seen again now for deteriorating clinical status     Limited response to volume infusion over preceding 12 hours.  Oliguric with increasing  SOB despite high dose furosemide trial    1.  CKD   -baseline creatinine 5 range   -GFR < 10 ml/min  2.  Non nephrotic range proteinuria  3.  Modest acute component v progression  4.  Combined respiratory/metabolic acidosis  5.  Modest hyperkalemia  6.  Hypotension   -low dose NE   -vasopressin  7.  Respiratory distress  8.  Secondary hyperparathyroidism   -recent vitamin D deficiency      Initiate dialysis given deteriorating respiratory status/persistent metabolic acidosis    Short 2 hour run with limited UF as tolerated  Re assess in am for additional run        Interval History:     Discussed in detail with Dr. Sepulveda.  Dialysis parameters reviewed with dialysis RN  Patient seen while on dialysis    2 hour run via newly placed right femoral Gilbert  2K bath  Na 140 meq/l HCO3- 35 meq/l  5% albumin prime  UF goal 2 liters   ml/min max    Initial  systolic range    Patient on high flow NC.  Uncomfortable.  Follows      ROS     ROS limited by patient condition      Dialysis Parameters:     Vitals were reviewed  Patient Vitals for the past 8 hrs:   BP Temp Temp src Heart Rate Resp SpO2   10/02/17 2107 - - - - - 93 %   10/02/17 2100 (!) 86/49 94.6  F (34.8  C) - - (!) 32 94 %   10/02/17 2045 94/60 94.6  F (34.8  C) - 66 20 95 %   10/02/17 2030 (!) 116/98 - - 68 24 (!) 89 %   10/02/17 2015 (!) 86/52 - - 65 17 95 %   10/02/17 2000 96/61 - - 63 23 95 %    10/02/17 1945 (!) 89/61 - - 62 29 97 %   10/02/17 1930 91/46 - - 65 19 97 %   10/02/17 1915 - - - 66 12 94 %   10/02/17 1900 (!) 86/51 - - 66 17 94 %   10/02/17 1845 103/54 - - 70 14 93 %   10/02/17 1830 99/62 - - 68 21 92 %   10/02/17 1815 101/52 - - 68 24 91 %   10/02/17 1800 102/71 94.4  F (34.7  C) Axillary 67 23 94 %   10/02/17 1745 104/59 - - 67 23 94 %   10/02/17 1730 93/56 - - 64 17 97 %   10/02/17 1715 (!) 75/49 - - 68 9 94 %   10/02/17 1700 99/59 - - 70 12 -   10/02/17 1645 97/69 - - 66 28 -   10/02/17 1638 - - - - - 91 %   10/02/17 1630 109/54 - - 67 (!) 31 -   10/02/17 1615 100/66 - - 66 23 -   10/02/17 1600 101/72 - - 64 14 91 %   10/02/17 1545 99/65 - - 63 (!) 34 -   10/02/17 1530 (!) 86/57 - - 62 21 -   10/02/17 1515 103/62 - - 61 28 -     Blood pressure range: Systolic (24hrs), Av , Min:73 , Max:116     Vitals:    10/02/17 0400   Weight: 80 kg (176 lb 5.9 oz)       Current Weight: 80  Dry Weight: TBD  Dialysis Temp: 36.5  C  Access Device: femoral  Access Site: right  Dialyzer: Revaclear  Dialysis Bath: 2  Sodium Profile: n  UF Goal: 2  Blood Flow Rate (mL/min): 300  Total Treatment Time (hrs): 2  Heparin: Low dose as required      EPO dose: n  Zemplar: n  IV Fe: n      Medications and Allergies:     Reviewed      Physical Exam:     Seen and examined during course of dialysis run    BP (!) 86/49  Temp 94.6  F (34.8  C)  Resp (!) 32  Wt 80 kg (176 lb 5.9 oz)  SpO2 93%  BMI 30.27 kg/m2    GENERAL: moderate distress  HEENT: high flow NC O2  RESP: decreased  CV: RRR, normal S1 S2  ABDOMEN: obese   (female): moreira  MS: trace edema  SKIN: catheter site clean without drainage  SKIN: leg wounds  NEURO: cranial nerves 2-12 intact  PSYCH: hallucinations     Data:         Recent Labs  Lab 10/02/17  2100   *   POTASSIUM 5.1   CHLORIDE 119*   CO2 14*   ANIONGAP 13   *   BUN 61*   CR 5.24*   GFRESTIMATED 8*   GFRESTBLACK 10*   MALICK 6.5*       Recent Labs   Lab Test  10/02/17   2100   10/02/17   1440  10/02/17   1045  10/01/17   2145  09/22/17   0818  09/19/17   1154  09/15/17   0745  09/14/17   1028  09/13/17   1849  09/08/17   0800   CR  5.24*  5.17*  5.15*  5.23*  4.34*  4.53*  4.50*  4.49*  4.31*  4.79       Recent Labs  Lab 10/02/17  2100 10/02/17  1440 10/02/17  1045 10/02/17  0400 10/01/17  2145   POTASSIUM 5.1 4.9 4.8 5.5* 5.6*       Recent Labs  Lab 10/01/17  2145   ALBUMIN 2.0*       Recent Labs  Lab 10/02/17  2100 10/02/17  0945 10/01/17  2145   LACT 0.8 1.0 1.6       Recent Labs  Lab 10/02/17  1440 10/02/17  1045 10/01/17  2145   HGB 7.9* 8.1* 8.1*         EMILIA Galeas    [facility]  [phone]

## 2017-10-03 NOTE — PROGRESS NOTES
[facility] Intensive Care Unit  Comprehensive Daily ICU Note  October 3, 2017      Kathryn Banks MRN# [id number]   Age: 77 year old YOB: 1940     Date of Admission: 10/2/2017  Primary care provider: Dheeraj Jj             Diagnosis/Problem list:     Patient Active Problem List    Diagnosis     Septic shock (H)     Anemia in stage 5 chronic kidney disease (H)     CKD (chronic kidney disease) stage 5, GFR less than 15 ml/min (H)     Volume overload     Pulmonary edema     Acidosis     Anemia     Chronic heart failure (H) with preserved EF     Type 2 diabetes mellitus with other circulatory complications     CAD - NSTEMI, CABG x 5 2007, angio in 2013 with patent grafts other than occluded graft to PL     CODE STATUS: Full Code         Assessment and Plan:     Kathryn Banks is a 77 year old female admitted on 10/2/2017 and currently in the ICU for hypotension, septic shock, and ESRD. Based on my exam and review of the data, the plan for the next 24h is as follows:     Neurology/Psych:   1. Confusion with active hallucinations -- Has not shown any aggression towards staff, is conversant, and easily redirected. RASS 0/-1  - Continue to provide orientation and redirection    Pulmonary:   1. Acute respiratory failure -- Pulmonary edema vs. Pneumonia. Pulmonary edema more likely given history of HFpEF and the scattered opacifications with lack of consolidation on CXR. Pneumonia still a possibility as sputum gram stain showed rare G(+) cocci, however her procalcitonin was 0.24. Her oxygenation improved with BiPAP overnight,  transitioned to HFNC this AM.   - Sputum cultures pending  - Continue HFNC when awake, wean as tolerated    2. Hx of sleep apnea -- On CPAP at night at home.  - Continue BiPAP/CPAP when asleep    Cardiovascular system:   1. Hypotension -- 2/2 septic shock. BP improving with fluid resuscitation and pressor support, will wean off pressors as tolerated. Currently off  Subjective   History of Present Illness    Review of Systems    Past Medical History:   Diagnosis Date   • Arthritis    • Asthma    • Diverticulitis    • Diverticulosis    • Gastric ulcer    • GERD (gastroesophageal reflux disease)    • Glaucoma    • Hypertension    • Lumbosacral disc disease    • Neurologic disorder    • Renal calculi    • Scoliosis    • Stroke     TIA - slurred speech, discoordinated       Allergies   Allergen Reactions   • Amoxicillin Rash   • Erythromycin Itching   • Celecoxib    • Citalopram    • Clonidine Derivatives    • Gabapentin    • Hydrocodone GI Intolerance       Past Surgical History:   Procedure Laterality Date   • ABDOMINAL SURGERY     • APPENDECTOMY     • BACK SURGERY     • EYE SURGERY     • HERNIA REPAIR  2011    ventral & hiatal w/ mesh   • LUMBAR FUSION  1999    L3-S1   • TONSILLECTOMY         Family History   Problem Relation Age of Onset   • Hypertension Mother    • Cancer Mother      vulvar   • Diabetes Mother    • Diabetes Father    • Heart attack Father    • Irritable bowel syndrome Father    • Hypertension Father    • Irritable bowel syndrome Sister    • Cancer Brother    • Alcohol abuse Brother    • Prostate cancer Brother 61   • Stomach cancer Maternal Aunt      metastatic   • No Known Problems Maternal Uncle    • No Known Problems Paternal Aunt    • No Known Problems Maternal Grandmother    • Lung cancer Maternal Grandfather    • COPD Maternal Grandfather    • Alcohol abuse Maternal Grandfather    • Hypertension Maternal Grandfather    • Cancer Maternal Grandfather    • Esophageal cancer Paternal Grandmother    • Cancer Paternal Grandmother    • Asthma Sister    • No Known Problems Brother    • No Known Problems Maternal Uncle    • Gout Other    • Rheumatic fever Other    • Hyperlipidemia Other        Social History     Social History   • Marital status:      Spouse name: N/A   • Number of children: N/A   • Years of education: N/A     Social History Main Topics    • Smoking status: Former Smoker     Packs/day: 0.25     Years: 20.00     Types: Cigarettes   • Smokeless tobacco: Former User     Types: Chew   • Alcohol use 0.6 oz/week     1 Cans of beer per week      Comment: occassional   • Drug use: No   • Sexual activity: Yes     Partners: Female     Other Topics Concern   • None     Social History Narrative           Objective   Physical Exam    Procedures         ED Course  ED Course                  MDM    Final diagnoses:   Sepsis, due to unspecified organism   Pneumonia due to infectious organism, unspecified laterality, unspecified part of lung   Acute renal failure, unspecified acute renal failure type          norepinephrine and vasopressin weaned down to 1.2ml/hr.     2. HFpEF -- Echo 8/12/17 with EF 60-65%, diastolic dysfunction, and mild-moderately reduced RV function.   - Repeat Echo today given persistent pulmonary edema.    3. Lower extremity ulcerations -- venous stasis vs. Arterial stasis ulceration  - wound cares with iodosorb gel and mepilex lite    Renal/Electrolytes:  1. ESRD s/p hemodialysis on 10/3 -- worsening of baseline CKD 2/2 hypovolemia, sepsis.  Creatinine improved from 5.24 to 3.78 after dialysis.   - Nephrology consulted, appreciate involvement.  - Will do another run of dialysis today. Given the possibility of needing ongoing dialysis, will leave femoral line in place for now. If she will be going on outpatient dialysis, will discuss with vascular surgery about placing a tunneled dialysis catheter.     ID:  1. Possible pneumonia -- sputum cultures pending, gram stain with rare G(+) cocci  - Continue empiric Piperacillin/tazobactam 2.25g Q6H    2. Hx C. Difficile on oral vancomycin, diagnosed 9/14/17 at [facility]. Was initially on oral metronidazole but transitioned to a 14 day course of oral vancomycin on 9/22/17. Negative stool C Diff Toxin PCR this admission.    - Continue oral vancomycin 250mg QID through 10/6/17    GI/Nutrition:  1. Diet-- renal, clear liquid diet. Advance as tolerated.     Endocrine:   1. Hyperglycemia with history of diabetes -- BG ranges 137-154 today.   - Continue subcutaneous SSI pre-meals and at bedtime    2. Relative adrenal insufficiency -- Serum cortisol was normal, but given her hypotension requiring pressors on admission, we would have expected her cortisol to be more elevated.   - Continue hydrocortisone IV 50mg Q6H    Heme/Onc:  1. Supratherapeutic INR on warfarin -- s/p 2 doses of 5mg Vitamin K.   - INR improved 6-->2.37--> 1.82   - Continue to hold warfarin    2. Anemia -- most recent Hgb 6.3. Will transfuse today.      ICU prophylaxis:   1. DVT prophylaxis -- start  subcutaneous heparin if she milagros an appropriate transfusion response.     Restrain:  Restrain for medical healing needed :NO.         Key treatment goals for next 24 hours:   1.  Hemodialysis  2.  Blood transfusion  3.  Monitor oxygenation levels  4.  Monitor hemodynamics and wean off pressors         Last 24 hours: key event / Changes     Hemodialysis initiated via R femoral temporary dialysis catheter on 10/3. 2 hr run with net fluid removal of 2.0L.          Medications:     Current Facility-Administered Medications Ordered in Epic   Medication Dose Route Frequency Last Rate Last Dose     HYDROmorphone (PF) (DILAUDID) injection 0.2 mg  0.2 mg Intravenous Q3H PRN         insulin aspart (NovoLOG) inj (RAPID ACTING)  1-7 Units Subcutaneous TID AC         insulin aspart (NovoLOG) inj (RAPID ACTING)  1-5 Units Subcutaneous At Bedtime         acetaminophen (TYLENOL) tablet 325-650 mg  325-650 mg Oral Q6H PRN   650 mg at 10/03/17 1027     0.9% sodium chloride BOLUS  250 mL Hemodialysis Machine Once         0.9% sodium chloride BOLUS  250-1,000 mL Intravenous Once in dialysis         0.9% sodium chloride BOLUS  100 mL Intravenous Q5 Min PRN         albumin human 5 % injection 250 mL  250 mL Intravenous Q1H PRN         No heparin products   Does not apply Continuous PRN         heparin 1000 unit/mL DIALYSIS Cath Care  3 mL Intravenous Once PRN         heparin 1000 unit/mL DIALYSIS Cath Care  3 mL Intravenous Once PRN         naloxone (NARCAN) injection 0.1-0.4 mg  0.1-0.4 mg Intravenous Q2 Min PRN         norepinephrine (LEVOPHED) 16 mg in D5W 250 mL infusion  0.03-0.4 mcg/kg/min Intravenous Continuous   Stopped at 10/03/17 0810     vasopressin (PITRESSIN) 40 Units in D5W 40 mL infusion  2.4 Units/hr Intravenous Continuous 1.2 mL/hr at 10/03/17 0811 1.2 Units/hr at 10/03/17 0811     albuterol neb solution 2.5 mg  2.5 mg Nebulization Q4H PRN         piperacillin-tazobactam (ZOSYN) 2.25 g vial to attach to  ml bag   2.25 g Intravenous Q6H   2.25 g at 10/03/17 0751     vancomycin place maki - receiving intermittent dosing  1 each Does not apply See Admin Instructions         dextrose 10 % 1,000 mL infusion   Intravenous Continuous PRN         glucose 40 % gel 15-30 g  15-30 g Oral Q15 Min PRN        Or     dextrose 50 % injection 25-50 mL  25-50 mL Intravenous Q15 Min PRN   25 mL at 10/02/17 1209    Or     glucagon injection 1 mg  1 mg Subcutaneous Q15 Min PRN         FIRST-VANCOMYCIN 50 solution 250 mg  250 mg Oral 4x Daily   250 mg at 10/03/17 0826     lidocaine (XYLOCAINE) 2 % topical gel   Topical Q4H PRN         hydrocortisone sodium succinate PF (solu-CORTEF) injection 50 mg  50 mg Intravenous Q6H   50 mg at 10/03/17 1012     - MEDICATION INSTRUCTIONS for Dialysis Patients -   Does not apply See Admin Instructions         No current [facility]-ordered outpatient prescriptions on file.            Lines/ tubes   The  lines and appliances are currently used in this patient   PICC or CVP: YES  Cee cath: YES  Arterial line: NO  ETT: NO  N/O gastric tube::  NO  Feeding tube: NO  Hemodialysis cathether: YES    All the above appliances including central lines were reviewed today and assessed for the need to be continued or placed de jimbo to care for this critically ill patient.         Physical Exam:   Vital Sign Ranges  Temperature Temp  Av.6  F (35.9  C)  Min: 92.6  F (33.7  C)  Max: 97.7  F (36.5  C)   Blood pressure Systolic (24hrs), Av , Min:74 , Max:144        Diastolic (24hrs), Av, Min:42, Max:98      Pulse No Data Recorded   Respirations Resp  Av.1  Min: 0  Max: 42   Pulse oximetry SpO2  Av.9 %  Min: 88 %  Max: 97 %       General:    Acute distress present No  Chronically ill looking YES    Neuro:   Alert:: Yes   Sedated: No  Pupils equal and reactive to light, grossly non focal: YES    HEENT:   Airway:  Orally intubated:No or Trached:No  N/O Gastric Tube or Feeding tube present: No    CV:   Rate is  regular:Yes     Neck vein distended No  S1S2. Gallop No, Murmur:No    Pulm:  Respiratory distress:No  Bilateral chest symmetrical chest expansion on inspection:  YES  Auscultation  R: Coarse rhonchi throughout  L:  Coarse rhonchi throughout with end expiratory wheezes in left lower lung field     Abdomen:   Abdomen soft, non-tender.  BS normal.  No masses, organomegaly  Cee present: YES    Extremities:   Edema: No  Bilateral lower extremity ulcerations, wrapped and covered. Surrounding erythema improving. Dark discoloration of non-ulcerated skin of bilateral lower extremities.     Skin:   Rash: No    Lines:  Exam of Line sites: Erythema or discharge present: No        Ancillary Data:   All laboratory and imaging data reviewed.         Hemodynamics/I & O:   BP - Mean:  [] 87    Intake/Output Summary (Last 24 hours) at 10/03/17 0851  Last data filed at 10/03/17 0800   Gross per 24 hour   Intake          2637.96 ml   Output             2240 ml   Net           397.96 ml         Mechanical ventilation   FiO2 (%): 100 %  Resp: 28    Recent Labs  Lab 10/01/17  2145   O2PER 21             Labs and EKG   All lab results reviewed past 24 hours  All imaging results reviewed past 24 hours    Chemistry:     Recent Labs  Lab 10/03/17  0425 10/02/17  2100 10/02/17  1440 10/02/17  1045    146* 146* 146*   POTASSIUM 3.9 5.1 4.9 4.8   CHLORIDE 112* 119* 119* 121*   CO2 21 14* 15* 14*   BUN 37* 61* 59* 58*   CR 3.78* 5.24* 5.17* 5.15*   * 163* 128* 72   MALICK 6.6* 6.5* 6.5* 6.7*       Recent Labs  Lab 10/01/17  2145   AST 16   ALT 16   BILITOTAL 0.3   ALBUMIN 2.0*   PROTTOTAL 5.5*   ALKPHOS 197*     Hematology:    Recent Labs  Lab 10/03/17  0820 10/03/17  0425 10/02/17  1440 10/02/17  1045 10/01/17  2145   WBC  --  10.8 11.6* 10.1 6.7   RBC  --  2.50* 3.01* 3.08* 3.13*   HGB 6.3* 6.6* 7.9* 8.1* 8.1*   HCT  --  20.3* 24.7* 25.5* 26.6*   PLT  --  219 300 307 257       Recent Labs  Lab 10/03/17  0425 10/02/17  2100  10/02/17  1045 10/02/17  0400   INR 1.82* 2.37* 6.00* 5.71*        Cultures:    Recent Labs  Lab 10/02/17  1430 10/01/17  2255 10/01/17  2157 10/01/17  2145   CULT Culture negative monitoring continues No growth No growth after 2 days No growth after 2 days     Blood culture:  Invalid input(s): BC   Urine culture:  No results for input(s): URC in the last 168 hours.     EKG:  New EKG findings today  No.       Imaging Studies:   CXR:  Increased perihilar and scattered lobar opacifications when compared to CXR form 10/2. No evidence of pneumothorax.     Scribed by June Ansari MS4 for Dr. Dwaine Tabor MD    Staff summary  DOS: 10/3/2017    All pertinent labs, imaging studies, physical examination, and medications have been reviewed by myself and the SICU team. The patient is critically ill by my examination today and requires continued ICU cares. A total of 45 minutes, exclusive of procedures, spent in the ICU in the management and care of this patient.    CNS: More alert today. Pain controlled.   - Altered mental status: Multifactorial; probably related to uremia, infection, meds. Supportive cares.  - Pain: Cautious use of hydromorphone. Can add APAP.  Pulm: BiPAP overnight 12/5/60; weaned back to high-flow NC this morning. Breathing sounds better with diminished secretions.   - Pulmonary insufficiency: Wean O2 as tolerated, continue pulmonary toilet.   - ANABEL: BiPAP at night and while sleeping.  - ?Pneumonia: Continue abx for now, following cultures.  - CXR with prominent vasculature/edema; (L)LL pleural effusion/consolidation looks improved to my read.  CV: 90s/40s-110s/60s, 70s-80s. Norepinephrine 0.18 -> 0 mcg/kg/min; vasopressin 2.4->1.2 unit/hr at my visit.  - Hypotension, ?septic shock, ?decompensated heart failure: Improving. Wean pressors to off. Unclear etiology of hypotension, as no clear source of infection identified yet, had normal lactate, despite evidence of volume depletion did not respond to  fluid and improved with HD/fluid removal.  - HTN: Home meds held.  - History diastolic dysfunction with preserved EF: Recheck echocardiogram.  - Atrial fibrillation: Paroxysmal. Rate acceptable today.  FEN/GI: Abdomen benign.  - Nutrition: As respiratory status improves and pressors tapered, OK to trial clear liquids; consider advancing if she continues to improve.  : Some urine output this morning; Cr down at 3.78 after HD.  - ESRD: Tolerated HD run overnight with 2L fluid removal. Anticipating further HD today; appreciate nephrology assistance.  - Metabolic acidosis: Bicarbonate improved.   - Hyperkalemia: Resolved with HD.  - Once active issues resolved, will need to re-site hemodialysis access; potentially as tunneled catheter if long-term use is anticipated.  Heme: Hb 6.3 (8)  - Acute/chronic anemia: Will transfuse PRBC x1 today. Pt amenable on my discussion with her and formal consent obtained.  - Anticoagulation: INR has corrected to 1.8. Stopping further vitamin K.  Musculoskeletal: Extremities warm, well-perfused.   - (B)LE ulcers: Continue wound cares.  Endocrine: Glucose 125-163.  - DM2: Insulin gtt transitioned to sliding scale intermittent coverage.  - Relative adrenal insufficiency: Hydrocortisone started yesterday; unclear if this has been contributory to improved BP. Will continue for today, stop if she becomes hypertensive.  ID: Afebrile, WBCs 10.8  - ?Pneumonia: <25 PMN on gram stain, some GPCs. Cultures pending. Continues on empiric pip/tazo, vancomycin.  - ?UTI: UA with pyuria (vs cystitis?), culture negative so far.  - ?Cellulitis: vs venous stasis changes; on appropriate antibiotics. Follow.  - Remainder of cultures NGTD.  - C difficile colitis: Persistent diarrhea, but PCR now negative. On enteral vancomycin through 10.6.   SICU: Consider starting SQH this evening if she has an appropriate transfusion response.     I agree with the findings and plan of care as documented in the note by Ms  Elan MS4.    Dwaine Tabor MD, PhD  Surgical critical care  October 3, 2017, 9:41 AM

## 2017-10-03 NOTE — PROGRESS NOTES
Critical Care Followup Note:    S:  Asked to see Ms. Banks for increasing oxygen requirements. Has been given IVF for hypotension with no improvement in BP and correlation with worsening respiratory status. Per RN patient has also begun hallucinating. Patient is in need of dialysis but earlier assessment today showed no urgent need and dialysis catheter was deferred because of high INR.     O: Temp:  [90.1  F (32.3  C)-94.6  F (34.8  C)] 94.6  F (34.8  C)  Heart Rate:  [46-70] 66  Resp:  [9-42] 32  BP: ()/(42-98) 86/49  FiO2 (%):  [70 %-100 %] 100 %  SpO2:  [85 %-97 %] 93 %  Blood pressures are higher using leg than arm - MAPS in the 80s.    Gen - stated age, chronically ill  CV - RRR  Resp - crackles      Intake/Output Summary (Last 24 hours) at 10/02/17 2324  Last data filed at 10/02/17 2000   Gross per 24 hour   Intake          1687.48 ml   Output              120 ml   Net          1567.48 ml   No significant response after Furosemide 60 or 120mg.  .  ROUTINE ICU LABS (Last four results)  CMP  Recent Labs  Lab 10/02/17  2100 10/02/17  1440 10/02/17  1045 10/02/17  0400 10/01/17  2145   * 146* 146*  --  145*   POTASSIUM 5.1 4.9 4.8 5.5* 5.6*   CHLORIDE 119* 119* 121*  --  120*   CO2 14* 15* 14*  --  11*   ANIONGAP 13 12 11  --  14   * 128* 72  --  323*   BUN 61* 59* 58*  --  60*   CR 5.24* 5.17* 5.15*  --  5.23*   GFRESTIMATED 8* 8* 8*  --  8*   GFRESTBLACK 10* 10* 10*  --  10*   MALICK 6.5* 6.5* 6.7*  --  7.0*   PROTTOTAL  --   --   --   --  5.5*   ALBUMIN  --   --   --   --  2.0*   BILITOTAL  --   --   --   --  0.3   ALKPHOS  --   --   --   --  197*   AST  --   --   --   --  16   ALT  --   --   --   --  16     CBC  Recent Labs  Lab 10/02/17  1440 10/02/17  1045 10/01/17  2145   WBC 11.6* 10.1 6.7   RBC 3.01* 3.08* 3.13*   HGB 7.9* 8.1* 8.1*   HCT 24.7* 25.5* 26.6*   MCV 82 83 85   MCH 26.2* 26.3* 25.9*   MCHC 32.0 31.8 30.5*   RDW 19.4* 19.4* 19.2*    307 257     INR  Recent Labs  Lab  10/02/17  2100 10/02/17  1045 10/02/17  0400 09/28/17   INR 2.37* 6.00* 5.71* 2.2     Arterial Blood Gas  Recent Labs  Lab 10/01/17  2145   O2PER 21     Procalcitonin: 0.24  Lactic acid: 0.8    CXR: left sided opacity vs. Pleural effusion    Sputum culture gramstain: rare GPC    A/P:  78 yo woman admitted with hypoxia, cough and hypotension.  Respiratory status worse after getting IVF. Labs not pointing strongly to sepsis as cause of hypoxia.    - Place dialysis access  - Emergent dialysis.  - Place arterial line if pressor needs increase but given normal lactic acid and adequate MAP perfusion at this time should be adequate  - OK for Bipap while doing trial of dialysis to see if this helps hypoxia    - Family informed    Critical Care time separate from Dr. Tabor and separate from procedures: 30 minutes    Emy Fofana MD

## 2017-10-03 NOTE — PROCEDURES
Procedure: Dialysis Access Placement  Consent:   1. Obtained from  after discussion of risk/benefit/alternatives) and all questions answered to the best of my knowledge. This signed consent is in the chart.        Universal Protocol:   Patient Identification was verified, time out was performed, site marking N/A, Imaging data N/A. Full Barrier precaution done: Hands washed, mask, gloves, gown, cap and eye protection all used.    Indication: Dialysis    Narrative: The vein was identified with portable ultrasound device (compression test, venous color and doppler flow pattern). Area prepped with chlorhexedine and Patient was head to toe draped. Sterile technique and full barrier precautions used. 1% lidocaine injected subcutaneously for local anesthesia. A dialysis catheter was inserted using standard Seldinger technique under real time US guidance after careful evaluation of the best side to minimize risk of infection and complication related to insertion. The central line was sutured at 16 cm depth.       Complications: No apparent complication.    Procedure performed by Dr Fofana on October 2, 2017

## 2017-10-03 NOTE — TELEPHONE ENCOUNTER
Sara reached out to patient to schedule, have not yet reached her. She will need to schedule appt at [facility] when she is discharged. Rachelle Vicente and Augustus Worthington via email once appt scheduled as they need to be sure they have the dose on hand.     She is now admitted.    Call date: 10/6    Yesy Samaniego, PharmD, [facility]  [phone]  October 3, 2017

## 2017-10-03 NOTE — PLAN OF CARE
Problem: Fluid Volume Excess ([place],NICU)  Goal: Identify Related Risk Factors and Signs and Symptoms  Related risk factors and signs and symptoms are identified upon initiation of Human Response Clinical Practice Guideline (CPG).   Outcome: Improving  Pt is alert, increasing confusion throughout shift with active hallucinations, pt able to move all extremities and follow commands, inappropriate statements.  Lung sounds had coarse crackles throughout, audible respirations from the door, increased respiratory distress and effort, HiFlo NC O2 increased to 100% and pt started desatting to the 80's, pt placed on bipap 12/5 80% FiO2. Dialysis catheter placed and after dialysis with bipap lung sounds improved and respiratory effort improved. FiO2 weaned to 60%. After dialysis pt's blood pressure improved and levophed was able to be titrated back to 0.02mcg/kg/min.  Bowel sounds are active, pt had one large antonella colored stool.  Minimal urine output.

## 2017-10-03 NOTE — PROGRESS NOTES
Hemoglobin of 6.6 noted.  Patient without emergent need for a transfusion and no consent in chart.  No need to awaken . Will recheck at 8AM and call for consent at that time if necessary.    Emy Fofana MD

## 2017-10-03 NOTE — PROGRESS NOTES
Potassium   Date Value Ref Range Status   10/03/2017 3.9 3.4 - 5.3 mmol/L Final   ]  Lab Results   Component Value Date    HGB 6.3 10/03/2017     Weight: 80.4 kg (177 lb 4 oz)  POST WT 77.9 kg    DIALYSIS PROCEDURE NOTE    Patient dialyzed for 3 hrs on a 3 K bath with a  net fluid removal of 2.5 L.  A BFR of 250 ml/min was obtained via right femoral temporary cvc.  Patient was seen by  during treatment.  Total heparin received during treatment:: 0units. Heparin instilled in both ports post run.    Meds given: none. 1 unit PRBC given on dialysis.   Complications: none. Pt on bipap during treatment.  Procedure and ESRD teaching done and questions answered.  See flowsheet in EPIC for further details and post assessment.  Machine water alarm in place and functioning.  HEPATITIS B SURFACE ANTIGEN unknown Date 10/2/17 HEPATITIS B SURFACE ANTIBODY unknown DATE 10/2/17  CHLORINE AND CHLORAMINES CHECK on WATER SYSTEM EVERY 4 hours.   Catheter Access: Aseptic prep done for both on/off.  Report received from: ALVARO uCrry RN  Report given to: ALVARO Curry RN    Outpatient Dialysis at [facility]    Naveed Tompkins RN

## 2017-10-04 ENCOUNTER — APPOINTMENT (OUTPATIENT)
Dept: GENERAL RADIOLOGY | Facility: CLINIC | Age: 77
DRG: 871 | End: 2017-10-04
Attending: INTERNAL MEDICINE
Payer: MEDICARE

## 2017-10-04 LAB
ANION GAP SERPL CALCULATED.3IONS-SCNC: 12 MMOL/L (ref 3–14)
BASE DEFICIT BLDV-SCNC: 4 MMOL/L
BASE DEFICIT BLDV-SCNC: NORMAL MMOL/L
BASE EXCESS BLDV CALC-SCNC: NORMAL MMOL/L
BUN SERPL-MCNC: 26 MG/DL (ref 7–30)
CALCIUM SERPL-MCNC: 6.6 MG/DL (ref 8.5–10.1)
CHLORIDE SERPL-SCNC: 109 MMOL/L (ref 94–109)
CO2 SERPL-SCNC: 23 MMOL/L (ref 20–32)
CREAT SERPL-MCNC: 3.19 MG/DL (ref 0.52–1.04)
ERYTHROCYTE [DISTWIDTH] IN BLOOD BY AUTOMATED COUNT: 18.7 % (ref 10–15)
GFR SERPL CREATININE-BSD FRML MDRD: 14 ML/MIN/1.7M2
GLUCOSE BLDC GLUCOMTR-MCNC: 151 MG/DL (ref 70–99)
GLUCOSE BLDC GLUCOMTR-MCNC: 167 MG/DL (ref 70–99)
GLUCOSE BLDC GLUCOMTR-MCNC: 172 MG/DL (ref 70–99)
GLUCOSE BLDC GLUCOMTR-MCNC: 181 MG/DL (ref 70–99)
GLUCOSE SERPL-MCNC: 157 MG/DL (ref 70–99)
HCO3 BLDV-SCNC: 22 MMOL/L (ref 21–28)
HCO3 BLDV-SCNC: NORMAL MMOL/L (ref 21–28)
HCT VFR BLD AUTO: 21.6 % (ref 35–47)
HGB BLD-MCNC: 7.1 G/DL (ref 11.7–15.7)
MAGNESIUM SERPL-MCNC: 1.7 MG/DL (ref 1.6–2.3)
MCH RBC QN AUTO: 27.1 PG (ref 26.5–33)
MCHC RBC AUTO-ENTMCNC: 32.9 G/DL (ref 31.5–36.5)
MCV RBC AUTO: 82 FL (ref 78–100)
O2/TOTAL GAS SETTING VFR VENT: NORMAL %
OXYHGB MFR BLDV: 78 %
OXYHGB MFR BLDV: NORMAL %
PCO2 BLDV: 47 MM HG (ref 40–50)
PCO2 BLDV: NORMAL MM HG (ref 40–50)
PH BLDV: 7.29 PH (ref 7.32–7.43)
PH BLDV: NORMAL PH (ref 7.32–7.43)
PLATELET # BLD AUTO: 182 10E9/L (ref 150–450)
PO2 BLDV: 46 MM HG (ref 25–47)
PO2 BLDV: NORMAL MM HG (ref 25–47)
POTASSIUM SERPL-SCNC: 3.2 MMOL/L (ref 3.4–5.3)
PROT SERPL-MCNC: 4.8 G/DL (ref 6.8–8.8)
RBC # BLD AUTO: 2.62 10E12/L (ref 3.8–5.2)
SODIUM SERPL-SCNC: 144 MMOL/L (ref 133–144)
VANCOMYCIN SERPL-MCNC: 23 MG/L
WBC # BLD AUTO: 9 10E9/L (ref 4–11)

## 2017-10-04 PROCEDURE — 99291 CRITICAL CARE FIRST HOUR: CPT | Performed by: SURGERY

## 2017-10-04 PROCEDURE — 40000275 ZZH STATISTIC RCP TIME EA 10 MIN

## 2017-10-04 PROCEDURE — 00000146 ZZHCL STATISTIC GLUCOSE BY METER IP

## 2017-10-04 PROCEDURE — 84155 ASSAY OF PROTEIN SERUM: CPT | Performed by: SURGERY

## 2017-10-04 PROCEDURE — 90937 HEMODIALYSIS REPEATED EVAL: CPT

## 2017-10-04 PROCEDURE — 25000132 ZZH RX MED GY IP 250 OP 250 PS 637: Mod: GY | Performed by: INTERNAL MEDICINE

## 2017-10-04 PROCEDURE — 25000125 ZZHC RX 250: Performed by: INTERNAL MEDICINE

## 2017-10-04 PROCEDURE — S0171 BUMETANIDE 0.5 MG: HCPCS | Performed by: INTERNAL MEDICINE

## 2017-10-04 PROCEDURE — 25000125 ZZHC RX 250: Performed by: SURGERY

## 2017-10-04 PROCEDURE — 20000003 ZZH R&B ICU

## 2017-10-04 PROCEDURE — 94640 AIRWAY INHALATION TREATMENT: CPT

## 2017-10-04 PROCEDURE — 25000128 H RX IP 250 OP 636: Performed by: SURGERY

## 2017-10-04 PROCEDURE — 80202 ASSAY OF VANCOMYCIN: CPT | Performed by: SURGERY

## 2017-10-04 PROCEDURE — 25000128 H RX IP 250 OP 636: Performed by: INTERNAL MEDICINE

## 2017-10-04 PROCEDURE — 25000125 ZZHC RX 250

## 2017-10-04 PROCEDURE — P9047 ALBUMIN (HUMAN), 25%, 50ML: HCPCS | Performed by: INTERNAL MEDICINE

## 2017-10-04 PROCEDURE — 80048 BASIC METABOLIC PNL TOTAL CA: CPT | Performed by: SURGERY

## 2017-10-04 PROCEDURE — A9270 NON-COVERED ITEM OR SERVICE: HCPCS | Mod: GY | Performed by: INTERNAL MEDICINE

## 2017-10-04 PROCEDURE — 71010 XR CHEST PORT 1 VW: CPT

## 2017-10-04 PROCEDURE — 93010 ELECTROCARDIOGRAM REPORT: CPT | Performed by: INTERNAL MEDICINE

## 2017-10-04 PROCEDURE — 82805 BLOOD GASES W/O2 SATURATION: CPT | Performed by: INTERNAL MEDICINE

## 2017-10-04 PROCEDURE — 85027 COMPLETE CBC AUTOMATED: CPT | Performed by: SURGERY

## 2017-10-04 PROCEDURE — 83735 ASSAY OF MAGNESIUM: CPT | Performed by: SURGERY

## 2017-10-04 PROCEDURE — 94660 CPAP INITIATION&MGMT: CPT

## 2017-10-04 PROCEDURE — 93005 ELECTROCARDIOGRAM TRACING: CPT

## 2017-10-04 PROCEDURE — 63400005 ZZH RX 634: Performed by: INTERNAL MEDICINE

## 2017-10-04 PROCEDURE — 25000131 ZZH RX MED GY IP 250 OP 636 PS 637: Mod: GY | Performed by: INTERNAL MEDICINE

## 2017-10-04 RX ORDER — BUMETANIDE 0.25 MG/ML
2 INJECTION INTRAMUSCULAR; INTRAVENOUS EVERY 8 HOURS
Status: DISCONTINUED | OUTPATIENT
Start: 2017-10-04 | End: 2017-10-11

## 2017-10-04 RX ORDER — METOPROLOL TARTRATE 1 MG/ML
INJECTION, SOLUTION INTRAVENOUS
Status: COMPLETED
Start: 2017-10-04 | End: 2017-10-04

## 2017-10-04 RX ORDER — METOPROLOL TARTRATE 1 MG/ML
5 INJECTION, SOLUTION INTRAVENOUS ONCE
Status: COMPLETED | OUTPATIENT
Start: 2017-10-04 | End: 2017-10-04

## 2017-10-04 RX ORDER — ALBUMIN, HUMAN INJ 5% 5 %
250 SOLUTION INTRAVENOUS
Status: DISCONTINUED | OUTPATIENT
Start: 2017-10-04 | End: 2017-10-04

## 2017-10-04 RX ORDER — CALCITRIOL 0.25 UG/1
0.25 CAPSULE, LIQUID FILLED ORAL DAILY
Status: DISCONTINUED | OUTPATIENT
Start: 2017-10-04 | End: 2017-10-11

## 2017-10-04 RX ORDER — ALBUMIN (HUMAN) 12.5 G/50ML
50 SOLUTION INTRAVENOUS
Status: DISCONTINUED | OUTPATIENT
Start: 2017-10-04 | End: 2017-10-04

## 2017-10-04 RX ORDER — POTASSIUM CHLORIDE 1500 MG/1
40 TABLET, EXTENDED RELEASE ORAL ONCE
Status: COMPLETED | OUTPATIENT
Start: 2017-10-04 | End: 2017-10-04

## 2017-10-04 RX ADMIN — Medication 250 MG: at 21:24

## 2017-10-04 RX ADMIN — IRON SUCROSE 100 MG: 20 INJECTION, SOLUTION INTRAVENOUS at 15:00

## 2017-10-04 RX ADMIN — EPOETIN ALFA 5000 UNITS: 3000 SOLUTION INTRAVENOUS; SUBCUTANEOUS at 14:25

## 2017-10-04 RX ADMIN — ALBUMIN (HUMAN) 50 ML: 12.5 SOLUTION INTRAVENOUS at 15:52

## 2017-10-04 RX ADMIN — BUMETANIDE 2 MG: 0.25 INJECTION INTRAMUSCULAR; INTRAVENOUS at 23:44

## 2017-10-04 RX ADMIN — POTASSIUM CHLORIDE 40 MEQ: 1500 TABLET, EXTENDED RELEASE ORAL at 22:54

## 2017-10-04 RX ADMIN — PIPERACILLIN SODIUM,TAZOBACTAM SODIUM 2.25 G: 2; .25 INJECTION, POWDER, FOR SOLUTION INTRAVENOUS at 23:43

## 2017-10-04 RX ADMIN — HEPARIN SODIUM 5000 UNITS: 5000 INJECTION, SOLUTION INTRAVENOUS; SUBCUTANEOUS at 21:24

## 2017-10-04 RX ADMIN — HEPARIN SODIUM 5000 UNITS: 5000 INJECTION, SOLUTION INTRAVENOUS; SUBCUTANEOUS at 06:04

## 2017-10-04 RX ADMIN — INSULIN ASPART 1 UNITS: 100 INJECTION, SOLUTION INTRAVENOUS; SUBCUTANEOUS at 14:41

## 2017-10-04 RX ADMIN — METOPROLOL TARTRATE 5 MG: 5 INJECTION INTRAVENOUS at 16:42

## 2017-10-04 RX ADMIN — Medication 250 MG: at 17:45

## 2017-10-04 RX ADMIN — INSULIN ASPART 1 UNITS: 100 INJECTION, SOLUTION INTRAVENOUS; SUBCUTANEOUS at 09:19

## 2017-10-04 RX ADMIN — SODIUM CHLORIDE 250 ML: 9 INJECTION, SOLUTION INTRAVENOUS at 14:32

## 2017-10-04 RX ADMIN — HYDROCORTISONE SODIUM SUCCINATE 50 MG: 100 INJECTION, POWDER, FOR SOLUTION INTRAMUSCULAR; INTRAVENOUS at 06:04

## 2017-10-04 RX ADMIN — ALBUTEROL SULFATE 2.5 MG: 2.5 SOLUTION RESPIRATORY (INHALATION) at 13:15

## 2017-10-04 RX ADMIN — METOPROLOL TARTRATE 5 MG: 5 INJECTION INTRAVENOUS at 16:15

## 2017-10-04 RX ADMIN — PIPERACILLIN SODIUM,TAZOBACTAM SODIUM 2.25 G: 2; .25 INJECTION, POWDER, FOR SOLUTION INTRAVENOUS at 08:35

## 2017-10-04 RX ADMIN — DILTIAZEM HYDROCHLORIDE 5 MG/HR: 5 INJECTION INTRAVENOUS at 18:42

## 2017-10-04 RX ADMIN — BUMETANIDE 2 MG: 0.25 INJECTION INTRAMUSCULAR; INTRAVENOUS at 16:51

## 2017-10-04 RX ADMIN — BUMETANIDE 2 MG: 0.25 INJECTION INTRAMUSCULAR; INTRAVENOUS at 09:13

## 2017-10-04 RX ADMIN — HYDROCORTISONE SODIUM SUCCINATE 50 MG: 100 INJECTION, POWDER, FOR SOLUTION INTRAMUSCULAR; INTRAVENOUS at 11:04

## 2017-10-04 RX ADMIN — INSULIN ASPART 1 UNITS: 100 INJECTION, SOLUTION INTRAVENOUS; SUBCUTANEOUS at 16:59

## 2017-10-04 RX ADMIN — SODIUM CHLORIDE 250 ML: 9 INJECTION, SOLUTION INTRAVENOUS at 14:31

## 2017-10-04 RX ADMIN — METOPROLOL TARTRATE 5 MG: 1 INJECTION, SOLUTION INTRAVENOUS at 16:15

## 2017-10-04 RX ADMIN — MAGNESIUM SULFATE HEPTAHYDRATE 1 G: 500 INJECTION, SOLUTION INTRAMUSCULAR; INTRAVENOUS at 18:53

## 2017-10-04 RX ADMIN — HEPARIN SODIUM 5000 UNITS: 5000 INJECTION, SOLUTION INTRAVENOUS; SUBCUTANEOUS at 14:48

## 2017-10-04 RX ADMIN — PIPERACILLIN SODIUM,TAZOBACTAM SODIUM 2.25 G: 2; .25 INJECTION, POWDER, FOR SOLUTION INTRAVENOUS at 02:37

## 2017-10-04 RX ADMIN — PIPERACILLIN SODIUM,TAZOBACTAM SODIUM 2.25 G: 2; .25 INJECTION, POWDER, FOR SOLUTION INTRAVENOUS at 18:08

## 2017-10-04 NOTE — PROVIDER NOTIFICATION
MD Notification    Notified Person:  MD    Notified Persons Name: Monty    Notification Date/Time: 10/7/17 1100    Notification Interaction:  Talked with Physician    Purpose of Notification: Notified MD regarding critical labs: pH and prelim cultures    Orders Received: pH improving, IV vanco d/c'd    Comments:

## 2017-10-04 NOTE — PROVIDER NOTIFICATION
PT currently on HD run. Called to room 2/2 bloody sputum. MD and RT notified and placed on BIPAP. Continue to monitor. csccrn

## 2017-10-04 NOTE — PROGRESS NOTES
Potassium   Date Value Ref Range Status   10/04/2017 3.2 (L) 3.4 - 5.3 mmol/L Final   ]  Lab Results   Component Value Date    HGB 7.1 10/04/2017     Weight: 80.6 kg (177 lb 11.1 oz)  POST WT: -1.5 kg  DIALYSIS PROCEDURE NOTE    Patient dialyzed for 2.5 hrs on a  K4 bath with a  net fluid removal of 1.5 L.    A BFR of 300ml/min was obtained via right femoral CVC.  No s/s of infection at access site.   Patient was seen by Dr. Palma during treatment.    Total heparin received during treatment:: 0 units.    Heparin instilled in both ports post run.    Meds given: Epogen, venofer, 25%albumin 50ml.   Complications: ~1 hour into treatment, patient's HR went from 90's to 160's; turned UF off, tristan TAVAREZ. Gave 50ml 25% albumin. EKG showed afib with rapid ventricular response. Pt denies having chest pain, palpitations, feeling light headed, or dizzy. Lowest systolic BP 80's.   Procedure and ESRD teaching done and questions answered.  See flowsheet in EPIC for further details and post assessment.  Machine water alarm in place and functioning.  HEPATITIS B SURFACE ANTIGEN: unknown  HEPATITIS B SURFACE ANTIBODY: unknown  CHLORINE AND CHLORAMINES CHECK on WATER SYSTEM EVERY 4 hours.   Catheter Access: Aseptic prep done for both on/off.  Report received from: CHANTAL Bennett RN  Report given to: RC Cardoza RN  Outpatient Dialysis at: [facility]; new to dialysis per this admission.    Nikki Jernigan RN  [facility]

## 2017-10-04 NOTE — PROGRESS NOTES
ICU staff  10/4/2017, 3:47 PM    Notified that patient appeared to be in atrial fibrillation. HR is labile, from 100s-160s. BP is a bit lower on last check at 105/60s. The patient is asymptomatic. She is on hemodialysis and has had ~1.5L of volume removed so far; she is now ultrafiltrating.    Reviewed cardiology and outpatient notes. She has recurring atrial fibrillation following an ablation earlier this year. She is chronically on metoprolol and amlodipine.     For now:  - Metoprolol if BP comes up a bit to see if rate improves.  - Add on Mg to AM labs.  - Expect this to be related to volume/electrolyte shifts from HD; follow as this completes.     Dwaine Tabor MD, PhD  Surgical critical care  October 4, 2017, 3:50 PM

## 2017-10-04 NOTE — PROVIDER NOTIFICATION
PT with HR up to 170, BP now 90/60, requested dialysis RN to stop pulling fluid via HD. Dr. Tabor notified orders for EKG followed. Dr Palma at bedside, orders for 25 g of 25% albumin and 5 metoprolol followed. csccrn

## 2017-10-04 NOTE — CONSULTS
"BRIEF NUTRITION ASSESSMENT      REASON FOR ASSESSMENT:  Nutrition Admission Screen - new/uncontrolled diabetes    NUTRITION HISTORY:  Limited nutritional history as patient was on bipap this late pm.  Information mostly obtained from EPIC and daughter Jennifer.  Patient has recently been visited by dietitians:  6/14/17 - DB diet education completed  7/10/17 - Renal, heart heathy diet education completed  7/11/17 - K and Phos containing foods reviewed further  8/14/17 - Discussed dialysis diet (higher protein) in light of plan for future HD and pt with wounds.    Pt tells me she followed the renal diet \"so so.\"  She was eating fairly well up until Monday.  She is not familiar with oral liquid nutritional supplements but is willing to try one now.  She thinks she has maintained her nutrition and muscle mass fairly well in light of aging and her health.  Note recent diagnosis of C-diff colitis.    CURRENT DIET AND INTAKE:  Diet:  Dialysis, Moderate Consistent CHO (9304-7799 kcals per day)  This calorie level is appropriate as provides roughly 25-30 kcal/kg adjusted wt.     Today pt only took bites for breakfast.  Appetite is very poor.  However, she was very interested to look at the Dialysis Diet list for room service.    ANTHROPOMETRICS:  Height: 5 ft 4 in  Admit Weight:  80 kg  BMI:  30.3 kg/m2  IBW:  54.5 kg   Weight Status: Overweight BMI 25-29.9  %IBW:  147%  Weight History:  Weight changes due to fluids with recent usual range 165-188 lbs. Pt is currently in the middle of this range.     Wt Readings from Last 20 Encounters:   10/04/17 80.6 kg (177 lb 11.1 oz)   10/01/17 77.3 kg (170 lb 8 oz)   09/22/17 77.1 kg (170 lb)   09/21/17 76.2 kg (168 lb)   09/19/17 76.5 kg (168 lb 11.2 oz)   09/15/17 76.1 kg (167 lb 11.2 oz)   09/13/17 78.7 kg (173 lb 6.4 oz)   09/11/17 78.4 kg (172 lb 12.8 oz)   09/08/17 75.4 kg (166 lb 4.8 oz)   09/06/17 75 kg (165 lb 6.4 oz)   09/06/17 75.8 kg (167 lb)   08/31/17 73.2 kg (161 lb 6.4 " "oz)   08/23/17 83.3 kg (183 lb 9.6 oz)   08/22/17 85.3 kg (188 lb 1.6 oz)   08/21/17 83.4 kg (183 lb 12.8 oz)   08/18/17 82 kg (180 lb 12.8 oz)   08/12/17 81.6 kg (180 lb)   08/10/17 84.5 kg (186 lb 3.2 oz)   07/17/17 80.9 kg (178 lb 6.4 oz)   07/14/17 80.7 kg (178 lb)       LABS:  Labs noted  Na 144 (NL)  K 3.2 (L)  BUN 26 (NL)  Cr 3.19 (H) - Note pt started on HD on 10/2    MALNUTRITION:  Patient does not meet two of the following criteria necessary for diagnosing malnutrition: significant weight loss, reduced intake, subcutaneous fat loss, muscle loss or fluid retention  Oral intake has been poor for 3 days.  - Per WOCN on 10/2:  Numerous slough covered wounds on bilateral LE.  Right LE especially looks hot and infected.  Will use Iodosorb Gel on wounds and Mepilex Lite Foam.  Recommend lymphedema to help with wound healing and prevention   - Pt with 2+ mild edema.    NUTRITION INTERVENTION:  Nutrition Diagnosis:  No nutrition diagnosis at this time.    Implementation:  Nutrition Education:  Per Provider order if indicated  Provided education on current diet order and the use of supplements to optimize oral intake.  Provided handout \"Dialysis Diet\" from [employer] to assist with menu selection.  Medical food supplement therapy - Ordered 4 oz Strawberry Nepro with meals for nutrition push.    FOLLOW UP/MONITORING:   Will re-evaluate in 7 - 10 days, or sooner, if re-consulted.    Julia Andino, RD, LD, CNSC    "

## 2017-10-04 NOTE — PLAN OF CARE
Problem: Patient Care Overview  Goal: Plan of Care/Patient Progress Review  Outcome: Declining  Confused, lethargic at times. Coarse LS, new blood-tinged sputum. SR/ST most of shift, until dialysis run and went into SVETA around 1530 with low BP. Pt asymptomatic. Urine output low. Frequent, loose stools x3 this shift. No c/o pain. Sating well on hiflow O2, but placed on bipap around 1400 for increased coarse LS and dyspnea. Dialysis ran for approx 1.5 hours and stopped for high HR and low BP. Family updated on plan of care.

## 2017-10-04 NOTE — PROGRESS NOTES
Renal Medicine Progress Note            Assessment/Plan:     1. CKD V   -started HD 10/2     2. Infection?   -on Zosyn and vancomycin     3. Pulmonary edema. CXR with vascular congestion and large L pleural effusion. Still requiring 100% O2 via HF.    4. Anemia. FE storage is on the low side.     5. CKD-MBD. Hypocalcemia and 2HPTH    Plan.   1. Vitamin D ordered  2. KCl 40 meq ordered  3. Started Bumex 2 mg IV q8hrs  4. L thoracentesis?  5. Will see if we can get short HD treatment for UF          Interval History:     She says does not feel well today.           Medications and Allergies:       bumetanide  2 mg Intravenous Q8H     insulin aspart  1-7 Units Subcutaneous TID AC     insulin aspart  1-5 Units Subcutaneous At Bedtime     heparin  5,000 Units Subcutaneous Q8H     piperacillin-tazobactam  2.25 g Intravenous Q6H     vancomycin place maki - receiving intermittent dosing  1 each Does not apply See Admin Instructions     vancomycin  250 mg Oral 4x Daily     hydrocortisone sodium succinate PF  50 mg Intravenous Q6H     - MEDICATION INSTRUCTIONS for Dialysis Patients -   Does not apply See Admin Instructions        Allergies   Allergen Reactions     Ciprofloxacin Nausea and Vomiting     Zofran did not help     Oxycodone Visual Disturbance     Delusions, blackouts      Lisinopril Cough     Sulfa Drugs GI Disturbance     LOSS OF TASTE            Physical Exam:   Vitals were reviewed  Heart Rate: 92, Blood pressure 148/86, temperature 98  F (36.7  C), temperature source Oral, resp. rate 20, weight 80.6 kg (177 lb 11.1 oz), SpO2 96 %, not currently breastfeeding.    Wt Readings from Last 3 Encounters:   10/04/17 80.6 kg (177 lb 11.1 oz)   10/01/17 77.3 kg (170 lb 8 oz)   09/22/17 77.1 kg (170 lb)       Intake/Output Summary (Last 24 hours) at 10/04/17 1030  Last data filed at 10/04/17 0800   Gross per 24 hour   Intake           916.38 ml   Output             2750 ml   Net         -1833.62 ml     GENERAL  APPEARANCE: NAD.  HEENT:  Eyes/ears/nose/neck grossly normal  RESP: + crackles and junky.   CV: irregular.   ABDOMEN: obese, soft, NT  EXTREMITIES/SKIN: no rashes/lesions on observed skin;  Edema up to the thighs  Neuro: a/o x3           Data:     CBC RESULTS:     Recent Labs  Lab 10/04/17  0440 10/03/17  1755 10/03/17  0820 10/03/17  0425 10/02/17  1440 10/02/17  1045 10/01/17  2145   WBC 9.0  --   --  10.8 11.6* 10.1 6.7   RBC 2.62*  --   --  2.50* 3.01* 3.08* 3.13*   HGB 7.1* 7.5* 6.3* 6.6* 7.9* 8.1* 8.1*   HCT 21.6*  --   --  20.3* 24.7* 25.5* 26.6*     --   --  219 300 307 257       Basic Metabolic Panel:    Recent Labs  Lab 10/04/17  0440 10/03/17  0425 10/02/17  2100 10/02/17  1440 10/02/17  1045 10/02/17  0400 10/01/17  2145    144 146* 146* 146*  --  145*   POTASSIUM 3.2* 3.9 5.1 4.9 4.8 5.5* 5.6*   CHLORIDE 109 112* 119* 119* 121*  --  120*   CO2 23 21 14* 15* 14*  --  11*   BUN 26 37* 61* 59* 58*  --  60*   CR 3.19* 3.78* 5.24* 5.17* 5.15*  --  5.23*   * 141* 163* 128* 72  --  323*   MALICK 6.6* 6.6* 6.5* 6.5* 6.7*  --  7.0*       INR  Recent Labs  Lab 10/03/17  0425 10/02/17  2100 10/02/17  1045 10/02/17  0400   INR 1.82* 2.37* 6.00* 5.71*      Attestation:   I have reviewed today's relevant vital signs, notes, medications, labs and imaging.    Davey Palma MD  [facility] - Nephrology  Office phone :[phone]  Pager: [phone]

## 2017-10-04 NOTE — PROGRESS NOTES
[facility]  Patient is currently open to home care services with [facility]. The patient is currently receiving RN services. [facility]  and team have been notified of patient admission. [facility] liaison will continue to follow patient during stay. If appropriate provide orders to resume home care at time of discharge.

## 2017-10-04 NOTE — PLAN OF CARE
Problem: Patient Care Overview  Goal: Plan of Care/Patient Progress Review  Outcome: Improving  A&Ox4, T/R throughout night. VSS on 40L 100% HFNC then BiPAP 70% overnight. Denies pain. Will continue to monitor.

## 2017-10-05 ENCOUNTER — APPOINTMENT (OUTPATIENT)
Dept: GENERAL RADIOLOGY | Facility: CLINIC | Age: 77
DRG: 871 | End: 2017-10-05
Attending: SURGERY
Payer: MEDICARE

## 2017-10-05 LAB
ANION GAP SERPL CALCULATED.3IONS-SCNC: 11 MMOL/L (ref 3–14)
BACTERIA SPEC CULT: ABNORMAL
BACTERIA SPEC CULT: ABNORMAL
BUN SERPL-MCNC: 25 MG/DL (ref 7–30)
CALCIUM SERPL-MCNC: 7.7 MG/DL (ref 8.5–10.1)
CHLORIDE SERPL-SCNC: 108 MMOL/L (ref 94–109)
CO2 SERPL-SCNC: 26 MMOL/L (ref 20–32)
CREAT SERPL-MCNC: 2.9 MG/DL (ref 0.52–1.04)
ERYTHROCYTE [DISTWIDTH] IN BLOOD BY AUTOMATED COUNT: 18.3 % (ref 10–15)
GFR SERPL CREATININE-BSD FRML MDRD: 16 ML/MIN/1.7M2
GLUCOSE BLDC GLUCOMTR-MCNC: 146 MG/DL (ref 70–99)
GLUCOSE BLDC GLUCOMTR-MCNC: 156 MG/DL (ref 70–99)
GLUCOSE BLDC GLUCOMTR-MCNC: 159 MG/DL (ref 70–99)
GLUCOSE BLDC GLUCOMTR-MCNC: 170 MG/DL (ref 70–99)
GLUCOSE SERPL-MCNC: 168 MG/DL (ref 70–99)
HBV SURFACE AB SERPL IA-ACNC: 42.43 M[IU]/ML
HBV SURFACE AG SERPL QL IA: NONREACTIVE
HCT VFR BLD AUTO: 24.7 % (ref 35–47)
HGB BLD-MCNC: 7.9 G/DL (ref 11.7–15.7)
MCH RBC QN AUTO: 26.7 PG (ref 26.5–33)
MCHC RBC AUTO-ENTMCNC: 32 G/DL (ref 31.5–36.5)
MCV RBC AUTO: 83 FL (ref 78–100)
PLATELET # BLD AUTO: 197 10E9/L (ref 150–450)
POTASSIUM SERPL-SCNC: 3.2 MMOL/L (ref 3.4–5.3)
RBC # BLD AUTO: 2.96 10E12/L (ref 3.8–5.2)
SODIUM SERPL-SCNC: 145 MMOL/L (ref 133–144)
SPECIMEN SOURCE: ABNORMAL
WBC # BLD AUTO: 11.8 10E9/L (ref 4–11)

## 2017-10-05 PROCEDURE — 99291 CRITICAL CARE FIRST HOUR: CPT | Performed by: SURGERY

## 2017-10-05 PROCEDURE — 90937 HEMODIALYSIS REPEATED EVAL: CPT

## 2017-10-05 PROCEDURE — 40000257 ZZH STATISTIC CONSULT NO CHARGE VASC ACCESS

## 2017-10-05 PROCEDURE — A9270 NON-COVERED ITEM OR SERVICE: HCPCS | Mod: GY | Performed by: SURGERY

## 2017-10-05 PROCEDURE — 00000146 ZZHCL STATISTIC GLUCOSE BY METER IP

## 2017-10-05 PROCEDURE — 25000128 H RX IP 250 OP 636: Performed by: INTERNAL MEDICINE

## 2017-10-05 PROCEDURE — 94640 AIRWAY INHALATION TREATMENT: CPT

## 2017-10-05 PROCEDURE — 71010 XR CHEST PORT 1 VW: CPT

## 2017-10-05 PROCEDURE — 25000125 ZZHC RX 250: Performed by: INTERNAL MEDICINE

## 2017-10-05 PROCEDURE — 80048 BASIC METABOLIC PNL TOTAL CA: CPT | Performed by: SURGERY

## 2017-10-05 PROCEDURE — 25000132 ZZH RX MED GY IP 250 OP 250 PS 637: Mod: GY | Performed by: INTERNAL MEDICINE

## 2017-10-05 PROCEDURE — 85027 COMPLETE CBC AUTOMATED: CPT | Performed by: SURGERY

## 2017-10-05 PROCEDURE — 36569 INSJ PICC 5 YR+ W/O IMAGING: CPT

## 2017-10-05 PROCEDURE — 94660 CPAP INITIATION&MGMT: CPT

## 2017-10-05 PROCEDURE — 27210222 ZZH KIT SHRLOCK 6FR POWER PICC

## 2017-10-05 PROCEDURE — 25000128 H RX IP 250 OP 636: Performed by: SURGERY

## 2017-10-05 PROCEDURE — 94640 AIRWAY INHALATION TREATMENT: CPT | Mod: 76

## 2017-10-05 PROCEDURE — 40000275 ZZH STATISTIC RCP TIME EA 10 MIN

## 2017-10-05 PROCEDURE — 25000132 ZZH RX MED GY IP 250 OP 250 PS 637: Mod: GY | Performed by: SURGERY

## 2017-10-05 PROCEDURE — 20000003 ZZH R&B ICU

## 2017-10-05 PROCEDURE — 25000125 ZZHC RX 250: Performed by: SURGERY

## 2017-10-05 PROCEDURE — S0171 BUMETANIDE 0.5 MG: HCPCS | Performed by: INTERNAL MEDICINE

## 2017-10-05 RX ORDER — METOPROLOL TARTRATE 25 MG/1
25 TABLET, FILM COATED ORAL 2 TIMES DAILY
Status: DISCONTINUED | OUTPATIENT
Start: 2017-10-05 | End: 2017-10-07

## 2017-10-05 RX ORDER — ALBUMIN, HUMAN INJ 5% 5 %
250 SOLUTION INTRAVENOUS
Status: DISCONTINUED | OUTPATIENT
Start: 2017-10-05 | End: 2017-10-05

## 2017-10-05 RX ORDER — IPRATROPIUM BROMIDE AND ALBUTEROL SULFATE 2.5; .5 MG/3ML; MG/3ML
1 SOLUTION RESPIRATORY (INHALATION) 3 TIMES DAILY
Status: DISCONTINUED | OUTPATIENT
Start: 2017-10-05 | End: 2017-10-18 | Stop reason: HOSPADM

## 2017-10-05 RX ORDER — LIDOCAINE 40 MG/G
CREAM TOPICAL
Status: DISCONTINUED | OUTPATIENT
Start: 2017-10-05 | End: 2017-10-18 | Stop reason: HOSPADM

## 2017-10-05 RX ORDER — ALBUMIN (HUMAN) 12.5 G/50ML
50 SOLUTION INTRAVENOUS
Status: DISCONTINUED | OUTPATIENT
Start: 2017-10-05 | End: 2017-10-05

## 2017-10-05 RX ORDER — HEPARIN SODIUM,PORCINE 10 UNIT/ML
2-5 VIAL (ML) INTRAVENOUS
Status: DISCONTINUED | OUTPATIENT
Start: 2017-10-05 | End: 2017-10-12

## 2017-10-05 RX ORDER — MEROPENEM 500 MG/1
500 INJECTION, POWDER, FOR SOLUTION INTRAVENOUS EVERY 24 HOURS
Status: DISCONTINUED | OUTPATIENT
Start: 2017-10-05 | End: 2017-10-10

## 2017-10-05 RX ORDER — POTASSIUM CHLORIDE 29.8 MG/ML
20 INJECTION INTRAVENOUS ONCE
Status: COMPLETED | OUTPATIENT
Start: 2017-10-05 | End: 2017-10-05

## 2017-10-05 RX ORDER — PENTOXIFYLLINE 400 MG/1
400 TABLET, EXTENDED RELEASE ORAL DAILY
Status: DISCONTINUED | OUTPATIENT
Start: 2017-10-05 | End: 2017-10-18 | Stop reason: HOSPADM

## 2017-10-05 RX ORDER — POTASSIUM CHLORIDE 7.45 MG/ML
10 INJECTION INTRAVENOUS
Status: DISPENSED | OUTPATIENT
Start: 2017-10-05 | End: 2017-10-05

## 2017-10-05 RX ADMIN — Medication 250 MG: at 18:46

## 2017-10-05 RX ADMIN — MEROPENEM 500 MG: 500 INJECTION, POWDER, FOR SOLUTION INTRAVENOUS at 18:37

## 2017-10-05 RX ADMIN — BUMETANIDE 2 MG: 0.25 INJECTION INTRAMUSCULAR; INTRAVENOUS at 18:24

## 2017-10-05 RX ADMIN — HEPARIN SODIUM 5000 UNITS: 5000 INJECTION, SOLUTION INTRAVENOUS; SUBCUTANEOUS at 21:10

## 2017-10-05 RX ADMIN — METOPROLOL TARTRATE 25 MG: 25 TABLET, FILM COATED ORAL at 21:10

## 2017-10-05 RX ADMIN — POTASSIUM CHLORIDE 20 MEQ: 29.8 INJECTION, SOLUTION INTRAVENOUS at 14:04

## 2017-10-05 RX ADMIN — HEPARIN SODIUM 5000 UNITS: 5000 INJECTION, SOLUTION INTRAVENOUS; SUBCUTANEOUS at 05:40

## 2017-10-05 RX ADMIN — BUMETANIDE 2 MG: 0.25 INJECTION INTRAMUSCULAR; INTRAVENOUS at 09:06

## 2017-10-05 RX ADMIN — Medication 250 MG: at 21:10

## 2017-10-05 RX ADMIN — SODIUM CHLORIDE 250 ML: 9 INJECTION, SOLUTION INTRAVENOUS at 14:50

## 2017-10-05 RX ADMIN — IPRATROPIUM BROMIDE AND ALBUTEROL SULFATE 3 ML: .5; 3 SOLUTION RESPIRATORY (INHALATION) at 12:31

## 2017-10-05 RX ADMIN — INSULIN ASPART 1 UNITS: 100 INJECTION, SOLUTION INTRAVENOUS; SUBCUTANEOUS at 09:13

## 2017-10-05 RX ADMIN — BUMETANIDE 2 MG: 0.25 INJECTION INTRAMUSCULAR; INTRAVENOUS at 23:54

## 2017-10-05 RX ADMIN — HEPARIN SODIUM 5000 UNITS: 5000 INJECTION, SOLUTION INTRAVENOUS; SUBCUTANEOUS at 14:09

## 2017-10-05 RX ADMIN — PIPERACILLIN SODIUM,TAZOBACTAM SODIUM 2.25 G: 2; .25 INJECTION, POWDER, FOR SOLUTION INTRAVENOUS at 05:39

## 2017-10-05 RX ADMIN — IPRATROPIUM BROMIDE AND ALBUTEROL SULFATE 3 ML: .5; 3 SOLUTION RESPIRATORY (INHALATION) at 23:59

## 2017-10-05 RX ADMIN — Medication 250 MG: at 09:00

## 2017-10-05 NOTE — PROGRESS NOTES
Pt was between BiPAP and HFNC this shift, BBS Rhonchi, SpO2 98. Will follow up with titration ventilation and oxygenation support.10/5/2017  Yamil Gama

## 2017-10-05 NOTE — PROGRESS NOTES
Potassium   Date Value Ref Range Status   10/05/2017 3.2 (L) 3.4 - 5.3 mmol/L Final   ]  Lab Results   Component Value Date    HGB 7.9 10/05/2017     Results for MARI HERNANDEZ (MRN [id number]) as of 10/5/2017 15:08   Ref. Range 10/3/2017 04:25   Hep B Surface Agn Latest Ref Range: NR^Nonreactive  Nonreactive   Hepatitis B Surface Antibody Latest Ref Range: <8.00 m[IU]/mL 42.43 (H)       Weight: 76.7 kg (169 lb 1.5 oz)    POST WT 74.2 kg  EDW  ??? kg    DIALYSIS PROCEDURE NOTE    Patient dialyzed for 3 hrs on a 4 K bath with a net fluid removal of 2.5 L.  A BFR of 250 ml/min was obtained via R femoral CVC.  Patient was seen by  during treatment.  Total heparin received during treatment:  0 units.  Meds given: None. Complications: None.      Procedure teaching done, questions answered.  Consent verified yes  See flowsheet in EPIC for further details and post assessment.    Machine water alarm in place and functioning.  Chlorine and chloramines checked on water system every 4 hours.  All safety checks done, air detector on, venous and arterial parameters set. Transducer protectors checked every 15 min.    Pt dialyzed at bedside in ICU.    Outpatient Dialysis TBD.    [facility]

## 2017-10-05 NOTE — PLAN OF CARE
Problem: Patient Care Overview  Goal: Plan of Care/Patient Progress Review  Outcome: No Change  Pt alert, confused to situation. Coarse lung sounds, blood tinged sputum. Suction small, thin amounts as needed.Pt switches between BiPap 70% and HFNC 40 LPM.  A-fib, HR in 130s-140s. Metoprolol given x2. Magnesium sulfate given x1. Cardizem gtt started 5mg/hr. BP stable. Discussed with Dr. Samuel about continued goal for HR control and pt ok to eat. Continue to monitor.

## 2017-10-05 NOTE — PROGRESS NOTES
"[facility] Intensive Care Unit  Comprehensive Daily ICU Note  October 5, 2017      Kathryn Banks MRN# [id number]   Age: 77 year old YOB: 1940     Date of Admission: 10/2/2017  Primary care provider: Dheeraj Jj              Diagnosis/Problem list:         Patient Active Problem List     Diagnosis     Septic shock (H)     Anemia in stage 5 chronic kidney disease (H)     CKD (chronic kidney disease) stage 5, GFR less than 15 ml/min (H)     Volume overload     Pulmonary edema     Acidosis     Anemia     Chronic heart failure (H) with preserved EF     Type 2 diabetes mellitus with other circulatory complications     CAD - NSTEMI, CABG x 5 2007, angio in 2013 with patent grafts other than occluded graft to PL      CODE STATUS: Full Code         Assessment and Plan:     Kathryn Banks is a 77 year old female admitted on 10/2/2017 and currently in the ICU for/with the problem(s) listed above. Based on my exam and review of the data,  the plan for the next 24h is as follows:    Neurology:   1. Altered Mental Status -- Multifactorial, most likely related to uremia, medications. Improving, patient no longer reports hallucinations. Did state she was having a \"panic attack\" this AM to nursing, seems to be related to difficulty breathing. Continue to provide orientation and redirection as appropriate.     2. Pain -- Tylenol as first option. Cautious use of dilaudid.     Pulmonary:   1. Acute respiratory failure -- Pulmonary edema 2/2 decompensated heart failure and pneumonia. Labile respiratory status, often requiring switching to BiPAP while awake in order to maintain SaO2 >92%.  On chart review, she was started on supplemental home oxygen in 8/2017 so it is likely that she will continue to need long term oxygen support. Increasing productive cough with blood tinged sputum today. Given her femoral dialysis catheter, she is unable to fully sit upright, which is making it difficult for her to clear " her secretions and may be contributing to her poor respiratory status.   - Wean oxygen as tolerated  - Suction expectorate PRN  - Modify goal SaO2 to >90% which is where her goal was during her prior hospitalization 9/13/17.    2. Pneumonia --  Sputum cultures returned small amount of yeast. Her hypoxia, evidence of RUL, LLL infiltrates on CXR, and productive cough are consistent with pneumonia, will treat on clinical grounds.   - Continue  Piperacillin/tazobactam for 7 day course (stop: 10/9)    3. Hx of sleep apnea -- On CPAP at night at home.  - Continue BiPAP/CPAP when asleep    Cardiovascular system: 139/70, 104-151 systolic,  diastolic  1. Hypotension -- Improved. Is off pressors and steroids.     2. Hypertension -- She has baseline hypertension managed with amlodipine and metoprolol outpatient. Her currently holding home antihypertensive medications.   - Restart PTA metoprolol    3. HFpEF -- Echo 10/2 unchanged from baseline 8/2017. EF 60-65%, diastolic dysfunction, and mild-moderately reduced RV function.      4. Paroxysmal A. Fib s/p ablation 6/2017 -- Went into A. Fib with RVR 10/4, was converted back to SR with diltiazem drip. Now in SR with occasional PVCs.     Renal/Electrolytes:  1. ESRD -- worsening of baseline CKD 2/2 hypovolemia, sepsis.  Creatinine improved from 5.24 to 2.9 with dialysis. Daily dialysis, will work with IR to place a tunneled RIJ dialysis catheter on 10/6.  - Nephrology consulted, appreciate involvement.     2. Oliguria -- Small increase in UOP, 695mL for past 24 hours. Cee in place.   - Strict I/Os  - Bumex    3. Metabolic acidosis -- improved with bicarbonate, pH 7.06-->7.29.     4. Hypokalemia -- Potassium 3.2 today. Will replete with 20mEq oral KCl    ID: Afebrile, WBC 9 --> 11.8  1. Pneumonia --  Continue Pip/tazo through 10/9/17     2. Hx C. Difficile on oral vancomycin, diagnosed 9/14/17 at [facility]. Was initially on oral metronidazole but transitioned to a 14 day course  of oral vancomycin on 9/22/17. Negative stool C Diff Toxin PCR this admission.    - Continue oral vancomycin through 10/6/17    GI/Nutrition  1. Diet -- Dialysis diet. NPO while on BiPAP.     Endocrine:   1. Hyperglycemia with history of diabetes -- BG ranges 118-157 today.   - Continue subcutaneous SSI pre-meals and at bedtime    2. Relative adrenal insufficiency -- serum cortisol level was normal on admission, but given level of hypotension would have expected cortisol level to be elevated if adrenals were responding appropriately. Given IV hydrocortisone 10/2-10/3, will discontinue today as patient is hypertensive.     Musculoskeletal:  1. Bilateral lower extremity ulceration -- improving with wound cares.     Heme/Onc:  1. Supratherapeutic INR on warfarin -- INR improved 6-->1.82 with Vitamin K.   - Continue to hold warfarin while in ICU     2. Normocytic Anemia -- most likely acute on chronic anemia 2/2 CKD, dilutional 2/2 volume overload.  Received 1 unit pRBC 10/3. Most recent Hgb 7.9. Will continue to monitor.     ICU prophylaxis:   Receiving SQH Q8H for DVT prophylaxis.     Restrain:  Restrain for medical healing needed :NO.         Key treatment goals for next 24 hours:   1. Hemodialysis  2. Monitor oxygenation levels and wean O2 as appropriate  3. Place PICC line in anticipation of removing central line 10/6         Medications:     Current Facility-Administered Medications Ordered in Epic   Medication Dose Route Frequency Last Rate Last Dose     insulin aspart (NovoLOG) inj (RAPID ACTING)  1-7 Units Subcutaneous Q4H         potassium chloride 10 mEq in 100 mL intermittent infusion  10 mEq Intravenous Q1H         ipratropium - albuterol 0.5 mg/2.5 mg/3 mL (DUONEB) neb solution 3 mL  1 vial Nebulization TID         metoprolol (LOPRESSOR) tablet 25 mg  25 mg Oral BID         pentoxifylline (TRENtal) CR tablet 400 mg  400 mg Oral Daily         lidocaine 1 % 0.5-5 mL  0.5-5 mL Other Once PRN         lidocaine  (LMX4) cream   Topical Once PRN         sodium chloride (PF) 0.9% PF flush 5-50 mL  5-50 mL Intracatheter Once PRN         heparin lock flush 10 UNIT/ML injection 2-5 mL  2-5 mL Intracatheter Once PRN         bumetanide (BUMEX) injection 2 mg  2 mg Intravenous Q8H   2 mg at 10/05/17 0906     cholecalciferol (vitamin D3) capsule CAPS 5,000 Units  5,000 Units Oral Daily         calcitRIOL (ROCALTROL) capsule 0.25 mcg  0.25 mcg Oral Daily         diltiazem (CARDIZEM) 125 mg in NaCl 0.9 % 125 mL infusion  5-15 mg/hr Intravenous Continuous   Stopped at 10/05/17 0309     HYDROmorphone (PF) (DILAUDID) injection 0.2 mg  0.2 mg Intravenous Q3H PRN   0.2 mg at 10/03/17 1425     acetaminophen (TYLENOL) tablet 325-650 mg  325-650 mg Oral Q6H PRN   650 mg at 10/03/17 1027     heparin sodium PF injection 5,000 Units  5,000 Units Subcutaneous Q8H   5,000 Units at 10/05/17 0540     naloxone (NARCAN) injection 0.1-0.4 mg  0.1-0.4 mg Intravenous Q2 Min PRN         albuterol neb solution 2.5 mg  2.5 mg Nebulization Q4H PRN   2.5 mg at 10/04/17 1315     piperacillin-tazobactam (ZOSYN) 2.25 g vial to attach to  ml bag  2.25 g Intravenous Q6H   2.25 g at 10/05/17 0539     dextrose 10 % 1,000 mL infusion   Intravenous Continuous PRN         glucose 40 % gel 15-30 g  15-30 g Oral Q15 Min PRN        Or     dextrose 50 % injection 25-50 mL  25-50 mL Intravenous Q15 Min PRN   25 mL at 10/02/17 1209    Or     glucagon injection 1 mg  1 mg Subcutaneous Q15 Min PRN         FIRST-VANCOMYCIN 50 solution 250 mg  250 mg Oral 4x Daily   250 mg at 10/05/17 0900     lidocaine (XYLOCAINE) 2 % topical gel   Topical Q4H PRN         - MEDICATION INSTRUCTIONS for Dialysis Patients -   Does not apply See Admin Instructions         No current Epic-ordered outpatient prescriptions on file.            Lines/ tubes   The  lines and appliances are currently used in this patient   PICC  CVP  Cee cath  Other device(s): R femoral dialysis catheter    All the  above appliances including central lines were reviewed today and assessed for the need to be continued or placed de jimbo to care for this critically ill patient.         Physical Exam:   Vital Sign Ranges  Temperature Temp  Av  F (36.1  C)  Min: 90.1  F (32.3  C)  Max: 99.8  F (37.7  C)   Blood pressure Systolic (24hrs), Av , Min:81 , Max:154     Diastolic (24hrs), Av, Min:59, Max:114    Pulse Pulse  Av.6  Min: 50  Max: 100   Respirations Resp  Av.9  Min: 0  Max: 44   Pulse oximetry SpO2  Av.2 %  Min: 85 %  Max: 100 %       General:  Awake and alert female, chronically ill appearing, lying in bed in no acute distress  Neuro: Alert, moderately confused patient. Oriented to self and location but not date. PERRL, no focal neurologic deficits on gross neuro exam.   HEENT: Not orally intubated, no N/O gastric tube present.  Dried blood secretions around her mouth.   CV: Regular rate, SR with occasional PVCs. Neck veins non-distended. No gallop or murmur. Radial pulses 2+ and regular.   Pulm:Bilateral chest symmetrical chest expansion on inspection. Increased coarse expiratory rhonchi throughout bilateral lungs compared to yesterday. Decreased breath sounds in RUL and LLL.  Abdomen: Abdomen soft, non-tender.  BS normal.  No masses, organomegaly. Cee present.   Extremities: No edema. Bilateral lower extremity ulcerations improving with decreased surrounding erythema. Dark discoloration of non-ulcerated skin of BLE.   Lines: No erythema or discharge present at line sites.         Ancillary Data:   All laboratory and imaging data reviewed.         Hemodynamics/I & O:   BP - Mean:  [] 122    Intake/Output Summary (Last 24 hours) at 10/05/17 1136  Last data filed at 10/05/17 0800   Gross per 24 hour   Intake           637.33 ml   Output             2185 ml   Net         -1547.67 ml         Mechanical ventilation   FiO2 (%): 97 %  Resp: 17         Labs and EKG   All lab results reviewed past  24 hours  All imaging results reviewed past 24 hours  Chemistry:     Recent Labs  Lab 10/05/17  0350 10/04/17  0440 10/03/17  0425 10/02/17  2100   * 144 144 146*   POTASSIUM 3.2* 3.2* 3.9 5.1   CHLORIDE 108 109 112* 119*   CO2 26 23 21 14*   BUN 25 26 37* 61*   CR 2.90* 3.19* 3.78* 5.24*   * 157* 141* 163*   MALICK 7.7* 6.6* 6.6* 6.5*       Recent Labs  Lab 10/04/17  0440 10/01/17  2145   AST  --  16   ALT  --  16   BILITOTAL  --  0.3   ALBUMIN  --  2.0*   PROTTOTAL 4.8* 5.5*   ALKPHOS  --  197*     Hematology:    Recent Labs  Lab 10/05/17  0350 10/04/17  0440 10/03/17  1755 10/03/17  0820 10/03/17  0425 10/02/17  1440 10/02/17  1045 10/01/17  2145   WBC 11.8* 9.0  --   --  10.8 11.6* 10.1 6.7   RBC 2.96* 2.62*  --   --  2.50* 3.01* 3.08* 3.13*   HGB 7.9* 7.1* 7.5* 6.3* 6.6* 7.9* 8.1* 8.1*   HCT 24.7* 21.6*  --   --  20.3* 24.7* 25.5* 26.6*    182  --   --  219 300 307 257       Recent Labs  Lab 10/03/17  0425 10/02/17  2100 10/02/17  1045 10/02/17  0400   INR 1.82* 2.37* 6.00* 5.71*        Cultures:    Recent Labs  Lab 10/02/17  1430 10/01/17  2255 10/01/17  2157 10/01/17  2145   CULT Light growthYeast*  Culture in progress No growth No growth after 4 days No growth after 4 days     Blood culture 10/1: no growth 3 days x2 samples  Urine culture 10/1: no growth       Imaging Studies:   CXR 10/4:  No significant changes since CXR 10/3. Interstital infiltrates more pronounced in R apex and L lower lobe. No evidence of pneumothorax. Small L pleural effusion unchanged from prior by my read.     Scribed by June Ansari MS4 for Dr. Dwaine Tabor MD.    Staff summary  DOS: 10/5/2017    All pertinent labs, imaging studies, physical examination, and medications have been reviewed by myself and the SICU team. The patient is critically ill by my examination today and requires continued ICU cares. A total of 45 minutes critical care time, exclusive of procedures, spent in the ICU in the management and care  of this patient.    CNS: Alert, appropriately interactive.  - Pain: APAP, cautious use of hydromorphone if needed.  Pulm: Requiring more BiPAP use today, saturating well on 12/5/70. Continues to have productive cough.  - CXR: Progression of RUL infiltrates. Stable left pleural effusion.  - Pulmonary insufficiency: Continue BiPAP as needed, wean to high-flow as able.   - Pleural effusion: Hasn't changed with volume removal; unclear if there would be much benefit in draining it as I suspect the airspace process on right is the source of her oxygenation issues.   CV: 80s-110s, 130s-170s/70-90s.  - Atrial fibrillation: Converted to NSR, diltiazem stopped.  - HTN: Resuming home metoprolol.  - Diastolic heart failure: No significant changes on echocardiogram.  FEN/GI: Abdomen benign.  - Nutrition: Has been tolerating PO intake; nutrition is following.  : UOP picking up with bumetanide.  - ESRD: HD run today, then remove catheter in anticipation of tunneled catheter placement tomorrow. Appreciate nephrology assistance.  - Replacing K.  Heme: Hb 7.9.  - Acute blood loss anemia: No indication for transfusion.   Musculoskeletal: Extremities warm, well-perfused. Continues local wound cares to the legs.  Endocrine: Glucose 156-181.  - DM2: SSI prn.  ID: Afebrile, WBCs 11.8 and slightly up.  - Health care associated pneumonia: Progression of infiltrates today. Changing pip/tazo to meropenem as she is progressing through antibiotic. Negative MRSA swab; consider adding vancomycin back.  - C difficile colitis: Completes enteral vancomycin 10/6.  SICU: [facility].    I agree with the findings and plan of care as documented by Ms Nicolekiana in our jointly developed note.    Dwaine Tabor MD, PhD  Surgical critical care  October 5, 2017, 1:29 PM

## 2017-10-05 NOTE — PLAN OF CARE
Problem: Patient Care Overview  Goal: Plan of Care/Patient Progress Review  Outcome: Improving  Pt on cardiazem gtt overnight. Converted to NSR approx 4am and gtt off. Pt continues to require high levels of o2 to maintain sats. Dyspneic with exertion, occasionally desats.

## 2017-10-05 NOTE — PROGRESS NOTES
Pt was on the BIPAP 12/5 70% throughout the night, tolerating well, SpO2 in the upper 90's. Gel pad and mepilex in place under the mask. Will continue to follow.  10/5/2017  Antoinette Robles

## 2017-10-05 NOTE — PROGRESS NOTES
9/19/17    CC: CKD    HPI: Kathryn Banks is a 77 year old female who presents for follow-up of CKD. Her PMH includes Type 2 DM, CAD with CABG x 5 , chronic atrial fibrillation, HTN, restless leg syndrome, pulmonary HTN, diastolic heart failure , anemia, CKD Stage 4 with baseline serum creatinine 2.4-2.8. Ms. Banks has had multiple MARTÍN episodes in the past with CKD since ~2013.  CKD likely related to DM, HTN, renovascular disease and from previous MARTÍN episodes. More recently, she has had hospitalizations for CHF as well as volume depleted state. Afib with RVR in May with MARTÍN at that time.  Underwent ablation procedure for atrial fibrillation on 6/7/2017. She has had volume shifts related to diarrhea as well as heart failure most recently.    Her GFR has been 8-10 since august; currently at 9. We spent time discussing preparation for dialysis. She asked about transplant and I explained that we would need to see stability with her medical conditions in order to be considered a tx candidate and she agreed. Her  is with heart transplatn so she is aware of the transplant process in general (immunosuppression, etc). She denies any nausea or vomiting and reports that she is eating well at this time. She still has some diarrhea. Breathing is stable. [facility] is [facility] closely with [facility]. She reports low energy. Weight is 168 lbs - was 167 on 9/15. She most recently was hospitalized 9/13-9/15 with C Diff colitis, resp failure, HFpEF. She is interested in dialysis if needed. She would attend the [facility] unit which is closest to your home. She is currently taking phoslo 1 tab TID with meals since her hospitalization along with calcitriol 0.5, calcium 500/vit D 400 BID. She is also taking sodium bicarbonate 1300 mg TID.      Allergies   Allergen Reactions     Ciprofloxacin Nausea and Vomiting     Zofran did not help     Oxycodone Visual Disturbance     Delusions, blackouts      Lisinopril Cough      Sulfa Drugs GI Disturbance     LOSS OF TASTE         No current facility-administered medications on file prior to visit.   Current Outpatient Prescriptions on File Prior to Visit:  pramipexole (MIRAPEX) 0.125 MG tablet Take 1 tablet (0.125 mg) by mouth 3 times daily   torsemide (DEMADEX) 20 MG tablet Take 2 tablets (40 mg) by mouth 2 times daily   Warfarin Sodium (COUMADIN PO) Take by mouth daily Plan was: 2 mg 9/28, 9/29, 10/11 mg 9/30Take as directed per INR results   fluticasone (FLONASE) 50 MCG/ACT spray Spray 1 spray into both nostrils daily   guaiFENesin-dextromethorphan (ROBITUSSIN DM) 100-10 MG/5ML syrup Take 5 mLs by mouth every 8 hours as needed for cough or congestion   calcitRIOL (ROCALTROL) 0.5 MCG capsule Take 1 capsule (0.5 mcg) by mouth daily   Calcium Carb-Cholecalciferol 500-400 MG-UNIT TABS Take 1 tablet by mouth 2 times daily   ipratropium - albuterol 0.5 mg/2.5 mg/3 mL (DUONEB) 0.5-2.5 (3) MG/3ML neb solution Take 1 vial by nebulization 3 times daily   ACETAMINOPHEN PO Take 650 mg by mouth every 4 hours as needed for pain For pain 1-5 out of 10   cyanocobalamin (VITAMIN B12) 1000 MCG/ML injection Inject 1 mL into the muscle every 30 days   aspirin 81 MG tablet Take 1 tablet (81 mg) by mouth daily   nitroGLYcerin (NITROSTAT) 0.4 MG sublingual tablet Place 1 tablet (0.4 mg) under the tongue every 5 minutes as needed for chest pain   gabapentin (NEURONTIN) 300 MG capsule Take 1 capsule (300 mg) by mouth At Bedtime   pravastatin (PRAVACHOL) 40 MG tablet Take 1 tablet (40 mg) by mouth daily   metoprolol (LOPRESSOR) 50 MG tablet Take 0.5 tablets (25 mg) by mouth 2 times daily   amLODIPine (NORVASC) 5 MG tablet Take 2 tablets (10 mg) by mouth daily   calcium acetate (PHOSLO) 667 MG CAPS capsule Take 1 capsule (667 mg) by mouth 3 times daily (with meals) (Patient taking differently: Take 1,334 mg by mouth 3 times daily (with meals) )   pentoxifylline (TRENTAL) 400 MG CR tablet Take 1 tablet (400 mg) by  mouth daily   Lactobacillus (ACIDOPHILUS) tablet Take 1 tablet by mouth daily   ORDER FOR DME Equipment being ordered: cpap machine, mask, humidifier, tubing and filters       Past Medical History:   Diagnosis Date     Carpal tunnel syndrome      Coronary atherosclerosis of native coronary artery 2006    5 vessel CABG     OBSTRUCTIVE SLEEP APNEA     using CPAP     Osteoarthrosis, unspecified whether generalized or localized, unspecified site     generalized arthritis, particularly in her hands and feet         Other and combined forms of senile cataract 2000    Bilateral     Other and unspecified hyperlipidemia      RESTLESS LEGS SYNDROME      TENOSYNOV HAND/WRIST NEC 6/30/2006     Type II or unspecified type diabetes mellitus without mention of complication, not stated as uncontrolled 2001    Diabetes mellitus/pt is diet controlled with weight loss     Typical atrial flutter (H) 01/17/2007    ablation 6/7/2017     Unspecified essential hypertension        Past Surgical History:   Procedure Laterality Date     ANGIOPLASTY       C APPENDECTOMY       C CABG, VEIN, FIVE  12/06    SVG x 4 and LIMA to LAD     C SOTO W/O FACETEC FORAMOT/DSKC 1/2 VRT SEG, LUMBAR  1968     C REMV CATARACT INTRACAP,INSERT LENS      Bilateral     C TOTAL ABDOM HYSTERECTOMY  1995     - fibroids     C VAGOTOMY/PYLOROPLASTY,SELECT  1970     COLONOSCOPY  12/3/2007     COLONOSCOPY  1/17/2014    Procedure: COLONOSCOPY;  Colonoscopy;  Surgeon: Zack Stearns MD;  Location:  GI     CYSTOSCOPY  11/25/09    [facility]     ENDOSCOPY  03/21/2000    Upper GI     HC COLONOSCOPY THRU STOMA, DIAGNOSTIC  10/7/04    poor prep, repeat in 2-3 years     HC COLONOSCOPY W/WO BRUSH/WASH  10/31/07    Repeat-poor quality prep     HC REMOVAL OF OVARY/TUBE(S)      Salpingo-Oophorectomy, Unilateral     HC REVISE MEDIAN N/CARPAL TUNNEL SURG      Carpal tunnel release     HC UGI ENDOSCOPY DIAG W BIOPSY  10/31/07     HC UGI ENDOSCOPY, SIMPLE EXAM  12/3/2007     OPEN  REDUCTION INTERNAL FIXATION HIP NAILING Left 7/3/2016    Procedure: OPEN REDUCTION INTERNAL FIXATION HIP NAILING;  Surgeon: Lake Schulz MD;  Location:  OR       Social History   Substance Use Topics     Smoking status: Former Smoker     Years: 10.00     Quit date: 1969     Smokeless tobacco: Never Used     Alcohol use 0.0 oz/week     0 Standard drinks or equivalent per week      Comment: 1/2 a beer once a month       Family History   Problem Relation Age of Onset     DIABETES Father      AODM     Neurologic Disorder Father      Parkinson's     Blood Disease Father       from blood clot from leg to lung immediately after hip fracture     DIABETES Paternal Grandmother      adult onset     DIABETES Maternal Grandmother      adult onset     Hypertension No family hx of      CEREBROVASCULAR DISEASE No family hx of        ROS: A 4 system review of systems was negative other than noted here or above.     Exam:  /71 (BP Location: Right arm, Patient Position: Chair, Cuff Size: Adult Regular)  Pulse 95  Temp 97.5  F (36.4  C) (Oral)  Wt 76.5 kg (168 lb 11.2 oz)  SpO2 98%  BMI 28.96 kg/m2    GENERAL APPEARANCE: alert and no distress  RESP: lungs clear to auscultation   CV: regular rhythm, normal rate, no rub  SKIN: no rash  NEURO: mentation intact and speech normal  PSYCH: affect normal/bright    Results:    Orders Only on 2017   Component Date Value Ref Range Status     Sodium 2017 147* 133 - 144 mmol/L Final     Potassium 2017 4.5  3.4 - 5.3 mmol/L Final     Chloride 2017 115* 94 - 109 mmol/L Final     Carbon Dioxide 2017 20  20 - 32 mmol/L Final     Anion Gap 2017 12  3 - 14 mmol/L Final     Glucose 2017 134* 70 - 99 mg/dL Final    Non Fasting     Urea Nitrogen 2017 49* 7 - 30 mg/dL Final     Creatinine 2017 4.53* 0.52 - 1.04 mg/dL Final     GFR Estimate 2017 9* >60 mL/min/1.7m2 Final    Non  GFR Calc     GFR Estimate  If Black 09/19/2017 11* >60 mL/min/1.7m2 Final    African American GFR Calc     Calcium 09/19/2017 6.9* 8.5 - 10.1 mg/dL Final     Phosphorus 09/19/2017 5.6* 2.5 - 4.5 mg/dL Final     Albumin 09/19/2017 2.2* 3.4 - 5.0 g/dL Final     Hemoglobin 09/19/2017 7.8* 11.7 - 15.7 g/dL Final    Comment: Critical Value called to and read back by  [facility] ON 9/19/2017 AT 1213 BY SG       Hematocrit 09/19/2017 25.0* 35.0 - 47.0 % Final     Iron 09/19/2017 45  35 - 180 ug/dL Final     Iron Binding Cap 09/19/2017 140* 240 - 430 ug/dL Final     Iron Saturation Index 09/19/2017 32  15 - 46 % Final     Ferritin 09/19/2017 258* 8 - 252 ng/mL Final     Protein Random Urine 09/19/2017 0.20  g/L Final     Protein Total Urine g/gr Creatinine 09/19/2017 1.16* 0 - 0.2 g/g Cr Final     Parathyroid Hormone Intact 09/19/2017 685* 12 - 72 pg/mL Final     Creatinine Urine 09/19/2017 17  mg/dL Final         Assessment/Plan:   1. CKD Stage 5:   - CKD: risk factors for CKD include diabetes, hypertension, previous MARTÍN events, and the potential of hemodynamic variability in the setting of her cardiac disease (potential cardiorenal).   - Most recent progression has been in the setting of multiple MARTÍN events in the setting of heart failure/aggressive diuresis.   - Because she has had some hematuria in the past serologic tests were done. Her C3 was mildly low with a normal C4. Anti-GBM and vasculitis testing were negative.  - at this time I feel that there is no benefit of a biopsy given the advanced nature of her kidney disease and the number of years that this has been occurring. My suspicion is that we would find quite a bit of scarring on her biopsy at this time any potential benefit of additional treatment would be outweighed by the significant risk of those treatment options.I also have a low suspicion that there is an underlying GN occurring at this time and instead feel her kidney disease is secondary to diabetes and hypertension  as well as previous acute kidney injury events.  - we spent time discussing renal replacement therapy options today. She is interested in in-center HD at [facility] when needed. We will set her up for access placement in the near future as soon as able. We will also see if we can arrange education at the [facility] unit (not certain this education is available).     2. Hypertension:  Blood pressure is at goal of less than 140/90. No changes are made today.    3. Mild hypernatremia - she is not drinking much in regards to water intake which I feel is leading to her hypernatremia. Educated on a reasonable amount of water intake.     4. Metabolic acidosis: currently taking 1300 mg TID of sodium bicarbonate - bicarbonate is only 20 but will monitor for now as expect this to improve with improvement of her diarrhea as well    5. Anemia: She is enrolled in anemia services. Hemoglobin  7.8 - currently on iron BID - iron sat 32%. Anemia services to continue EPO as needed.     6. C Diff colitis: on tx currently    7. BMD: hyperphosphatemia - will increase phoslo to a total of 4 tabs in a day.       Patient Instructions   1. Take the phoslo three times a day with meals - ok to take 1 tab with breakfast and lunch but take 2 with dinner.  2. Yesy Samaniego from anemia services will be in touch regarding starting EPO to help boost your blood level  3. We will set up a tour of the [facility] and try to see if there is education through their unit  4. We will arrange a vascular referral to discuss getting an arm access placed in the near future.   5. Labs in 2 weeks  6. Follow-up in one month  7. If you have worsening symptoms of kidney failure, please call us during the week days hours or if any question after hours or weekend, please call nephrology on call at [phone].              Vibha Barfield, DO

## 2017-10-05 NOTE — PROGRESS NOTES
Renal Medicine Progress Note            Assessment/Plan:     1. CKD V                         -started HD 10/2      2. Infection?                         -on IV abx      3. Pulmonary edema/respitoary failure. CXR with vascular congestion and large L pleural effusion. Still requiring 100% O2 via HF.    -TTE with moderate RV dysfunction, + DD and pulmonary hypertenison   -moderate left pleural effusion     4. Anemia. FE storage is on the low side.      5. CKD-MBD. Hypocalcemia and 2HPTH    6. Recurrent Afib. Pt had an ablation earlier this year    Plan  1. HD x 3 hrs for more UF today.   2. Okay with 20 meq of KCl. Will HD on 4K bath  3. Okay to remove have IR place a tunneled CVC, then can remove temp femoral HD CVC after HD today  4. Left thoracentesis?          Interval History:     Patient was in Afib with RVR, and she was hypotension on HD yesterday. She remains in Afib today. SBP is much better.   She is on BiPAP.           Medications and Allergies:       insulin aspart  1-7 Units Subcutaneous Q4H     potassium chloride  10 mEq Intravenous Q1H     ipratropium - albuterol 0.5 mg/2.5 mg/3 mL  1 vial Nebulization TID     metoprolol  25 mg Oral BID     pentoxifylline  400 mg Oral Daily     bumetanide  2 mg Intravenous Q8H     cholecalciferol  5,000 Units Oral Daily     calcitRIOL  0.25 mcg Oral Daily     insulin aspart  1-5 Units Subcutaneous At Bedtime     heparin  5,000 Units Subcutaneous Q8H     piperacillin-tazobactam  2.25 g Intravenous Q6H     vancomycin  250 mg Oral 4x Daily     - MEDICATION INSTRUCTIONS for Dialysis Patients -   Does not apply See Admin Instructions        Allergies   Allergen Reactions     Ciprofloxacin Nausea and Vomiting     Zofran did not help     Oxycodone Visual Disturbance     Delusions, blackouts      Lisinopril Cough     Sulfa Drugs GI Disturbance     LOSS OF TASTE            Physical Exam:   Vitals were reviewed  Heart Rate: 96, Blood pressure (!) 151/100, pulse 100, temperature  98.2  F (36.8  C), temperature source Oral, resp. rate 17, weight 76.7 kg (169 lb 1.5 oz), SpO2 92 %, not currently breastfeeding.    Wt Readings from Last 3 Encounters:   10/05/17 76.7 kg (169 lb 1.5 oz)   10/01/17 77.3 kg (170 lb 8 oz)   09/22/17 77.1 kg (170 lb)       Intake/Output Summary (Last 24 hours) at 10/05/17 0947  Last data filed at 10/05/17 0800   Gross per 24 hour   Intake           657.33 ml   Output             2215 ml   Net         -1557.67 ml     GENERAL APPEARANCE: NAD.  HEENT:  on BIPAP  RESP: + crackles and junky. Coarse.  CV: irregular/irregular  ABDOMEN: obese, soft, NT  EXTREMITIES/SKIN: no rashes/lesions on observed skin; Edema is better  Neuro: a/o x3         Data:     CBC RESULTS:     Recent Labs  Lab 10/05/17  0350 10/04/17  0440 10/03/17  1755 10/03/17  0820 10/03/17  0425 10/02/17  1440 10/02/17  1045 10/01/17  2145   WBC 11.8* 9.0  --   --  10.8 11.6* 10.1 6.7   RBC 2.96* 2.62*  --   --  2.50* 3.01* 3.08* 3.13*   HGB 7.9* 7.1* 7.5* 6.3* 6.6* 7.9* 8.1* 8.1*   HCT 24.7* 21.6*  --   --  20.3* 24.7* 25.5* 26.6*    182  --   --  219 300 307 257       Basic Metabolic Panel:    Recent Labs  Lab 10/05/17  0350 10/04/17  0440 10/03/17  0425 10/02/17  2100 10/02/17  1440 10/02/17  1045   * 144 144 146* 146* 146*   POTASSIUM 3.2* 3.2* 3.9 5.1 4.9 4.8   CHLORIDE 108 109 112* 119* 119* 121*   CO2 26 23 21 14* 15* 14*   BUN 25 26 37* 61* 59* 58*   CR 2.90* 3.19* 3.78* 5.24* 5.17* 5.15*   * 157* 141* 163* 128* 72   MALICK 7.7* 6.6* 6.6* 6.5* 6.5* 6.7*       INR  Recent Labs  Lab 10/03/17  0425 10/02/17  2100 10/02/17  1045 10/02/17  0400   INR 1.82* 2.37* 6.00* 5.71*      Attestation:   I have reviewed today's relevant vital signs, notes, medications, labs and imaging.    Davey Palma MD  [facility] - Nephrology  Office phone :[phone]  Pager: [phone]

## 2017-10-06 ENCOUNTER — APPOINTMENT (OUTPATIENT)
Dept: INTERVENTIONAL RADIOLOGY/VASCULAR | Facility: CLINIC | Age: 77
DRG: 871 | End: 2017-10-06
Attending: SURGERY
Payer: MEDICARE

## 2017-10-06 LAB
ANION GAP SERPL CALCULATED.3IONS-SCNC: 13 MMOL/L (ref 3–14)
BUN SERPL-MCNC: 19 MG/DL (ref 7–30)
CALCIUM SERPL-MCNC: 7.5 MG/DL (ref 8.5–10.1)
CHLORIDE SERPL-SCNC: 109 MMOL/L (ref 94–109)
CO2 SERPL-SCNC: 26 MMOL/L (ref 20–32)
CREAT SERPL-MCNC: 2.98 MG/DL (ref 0.52–1.04)
ERYTHROCYTE [DISTWIDTH] IN BLOOD BY AUTOMATED COUNT: 18.2 % (ref 10–15)
GFR SERPL CREATININE-BSD FRML MDRD: 15 ML/MIN/1.7M2
GLUCOSE BLDC GLUCOMTR-MCNC: 137 MG/DL (ref 70–99)
GLUCOSE BLDC GLUCOMTR-MCNC: 139 MG/DL (ref 70–99)
GLUCOSE BLDC GLUCOMTR-MCNC: 143 MG/DL (ref 70–99)
GLUCOSE BLDC GLUCOMTR-MCNC: 147 MG/DL (ref 70–99)
GLUCOSE BLDC GLUCOMTR-MCNC: 147 MG/DL (ref 70–99)
GLUCOSE BLDC GLUCOMTR-MCNC: 149 MG/DL (ref 70–99)
GLUCOSE BLDC GLUCOMTR-MCNC: 156 MG/DL (ref 70–99)
GLUCOSE SERPL-MCNC: 151 MG/DL (ref 70–99)
HBV SURFACE AB SERPL IA-ACNC: 42.5 M[IU]/ML
HBV SURFACE AG SERPL QL IA: NONREACTIVE
HCT VFR BLD AUTO: 24.4 % (ref 35–47)
HGB BLD-MCNC: 7.7 G/DL (ref 11.7–15.7)
INR PPP: 0.95 (ref 0.86–1.14)
MCH RBC QN AUTO: 27.2 PG (ref 26.5–33)
MCHC RBC AUTO-ENTMCNC: 31.6 G/DL (ref 31.5–36.5)
MCV RBC AUTO: 86 FL (ref 78–100)
PLATELET # BLD AUTO: 159 10E9/L (ref 150–450)
POTASSIUM SERPL-SCNC: 3.2 MMOL/L (ref 3.4–5.3)
POTASSIUM SERPL-SCNC: 3.4 MMOL/L (ref 3.4–5.3)
POTASSIUM SERPL-SCNC: NORMAL MMOL/L (ref 3.4–5.3)
RBC # BLD AUTO: 2.83 10E12/L (ref 3.8–5.2)
SODIUM SERPL-SCNC: 148 MMOL/L (ref 133–144)
WBC # BLD AUTO: 8.2 10E9/L (ref 4–11)

## 2017-10-06 PROCEDURE — A9270 NON-COVERED ITEM OR SERVICE: HCPCS | Mod: GY | Performed by: INTERNAL MEDICINE

## 2017-10-06 PROCEDURE — C1769 GUIDE WIRE: HCPCS

## 2017-10-06 PROCEDURE — 36558 INSERT TUNNELED CV CATH: CPT | Mod: LT

## 2017-10-06 PROCEDURE — 25000128 H RX IP 250 OP 636: Performed by: INTERNAL MEDICINE

## 2017-10-06 PROCEDURE — 84132 ASSAY OF SERUM POTASSIUM: CPT | Performed by: INTERNAL MEDICINE

## 2017-10-06 PROCEDURE — 40000275 ZZH STATISTIC RCP TIME EA 10 MIN

## 2017-10-06 PROCEDURE — A9270 NON-COVERED ITEM OR SERVICE: HCPCS | Mod: GY | Performed by: SURGERY

## 2017-10-06 PROCEDURE — 25000125 ZZHC RX 250

## 2017-10-06 PROCEDURE — 25000128 H RX IP 250 OP 636

## 2017-10-06 PROCEDURE — 20000003 ZZH R&B ICU

## 2017-10-06 PROCEDURE — S0171 BUMETANIDE 0.5 MG: HCPCS | Performed by: INTERNAL MEDICINE

## 2017-10-06 PROCEDURE — 02H633Z INSERTION OF INFUSION DEVICE INTO RIGHT ATRIUM, PERCUTANEOUS APPROACH: ICD-10-PCS | Performed by: RADIOLOGY

## 2017-10-06 PROCEDURE — 00000146 ZZHCL STATISTIC GLUCOSE BY METER IP

## 2017-10-06 PROCEDURE — 87340 HEPATITIS B SURFACE AG IA: CPT | Performed by: INTERNAL MEDICINE

## 2017-10-06 PROCEDURE — 85027 COMPLETE CBC AUTOMATED: CPT | Performed by: SURGERY

## 2017-10-06 PROCEDURE — C1750 CATH, HEMODIALYSIS,LONG-TERM: HCPCS

## 2017-10-06 PROCEDURE — 27210886 ZZH ACCESSORY CR5

## 2017-10-06 PROCEDURE — 94660 CPAP INITIATION&MGMT: CPT

## 2017-10-06 PROCEDURE — 80048 BASIC METABOLIC PNL TOTAL CA: CPT | Performed by: SURGERY

## 2017-10-06 PROCEDURE — 25000128 H RX IP 250 OP 636: Performed by: SURGERY

## 2017-10-06 PROCEDURE — 25800025 ZZH RX 258: Performed by: SURGERY

## 2017-10-06 PROCEDURE — S5010 5% DEXTROSE AND 0.45% SALINE: HCPCS | Performed by: SURGERY

## 2017-10-06 PROCEDURE — 94640 AIRWAY INHALATION TREATMENT: CPT | Mod: 76

## 2017-10-06 PROCEDURE — 27210905 ZZH KIT CR7

## 2017-10-06 PROCEDURE — 25000125 ZZHC RX 250: Performed by: SURGERY

## 2017-10-06 PROCEDURE — 0JH60XZ INSERTION OF TUNNELED VASCULAR ACCESS DEVICE INTO CHEST SUBCUTANEOUS TISSUE AND FASCIA, OPEN APPROACH: ICD-10-PCS | Performed by: RADIOLOGY

## 2017-10-06 PROCEDURE — 85610 PROTHROMBIN TIME: CPT | Performed by: INTERNAL MEDICINE

## 2017-10-06 PROCEDURE — 25000132 ZZH RX MED GY IP 250 OP 250 PS 637: Mod: GY | Performed by: INTERNAL MEDICINE

## 2017-10-06 PROCEDURE — 25000125 ZZHC RX 250: Performed by: INTERNAL MEDICINE

## 2017-10-06 PROCEDURE — S0169 CALCITROL: HCPCS | Mod: GY | Performed by: INTERNAL MEDICINE

## 2017-10-06 PROCEDURE — 99291 CRITICAL CARE FIRST HOUR: CPT | Performed by: SURGERY

## 2017-10-06 PROCEDURE — 25000132 ZZH RX MED GY IP 250 OP 250 PS 637: Mod: GY | Performed by: SURGERY

## 2017-10-06 PROCEDURE — 94640 AIRWAY INHALATION TREATMENT: CPT

## 2017-10-06 PROCEDURE — 86706 HEP B SURFACE ANTIBODY: CPT | Performed by: INTERNAL MEDICINE

## 2017-10-06 RX ORDER — LIDOCAINE HYDROCHLORIDE 10 MG/ML
INJECTION, SOLUTION INFILTRATION; PERINEURAL
Status: DISCONTINUED
Start: 2017-10-06 | End: 2017-10-06 | Stop reason: HOSPADM

## 2017-10-06 RX ORDER — FLUMAZENIL 0.1 MG/ML
0.2 INJECTION, SOLUTION INTRAVENOUS
Status: DISCONTINUED | OUTPATIENT
Start: 2017-10-06 | End: 2017-10-06 | Stop reason: HOSPADM

## 2017-10-06 RX ORDER — FLUMAZENIL 0.1 MG/ML
INJECTION, SOLUTION INTRAVENOUS
Status: DISCONTINUED
Start: 2017-10-06 | End: 2017-10-06 | Stop reason: HOSPADM

## 2017-10-06 RX ORDER — HYDRALAZINE HYDROCHLORIDE 20 MG/ML
10 INJECTION INTRAMUSCULAR; INTRAVENOUS EVERY 6 HOURS PRN
Status: DISCONTINUED | OUTPATIENT
Start: 2017-10-06 | End: 2017-10-18 | Stop reason: HOSPADM

## 2017-10-06 RX ORDER — LIDOCAINE HYDROCHLORIDE 10 MG/ML
1-30 INJECTION, SOLUTION EPIDURAL; INFILTRATION; INTRACAUDAL; PERINEURAL
Status: COMPLETED | OUTPATIENT
Start: 2017-10-06 | End: 2017-10-06

## 2017-10-06 RX ORDER — FENTANYL CITRATE 50 UG/ML
INJECTION, SOLUTION INTRAMUSCULAR; INTRAVENOUS
Status: COMPLETED
Start: 2017-10-06 | End: 2017-10-06

## 2017-10-06 RX ORDER — HEPARIN SODIUM 1000 [USP'U]/ML
INJECTION, SOLUTION INTRAVENOUS; SUBCUTANEOUS
Status: DISCONTINUED
Start: 2017-10-06 | End: 2017-10-06 | Stop reason: HOSPADM

## 2017-10-06 RX ORDER — NALOXONE HYDROCHLORIDE 0.4 MG/ML
INJECTION, SOLUTION INTRAMUSCULAR; INTRAVENOUS; SUBCUTANEOUS
Status: DISCONTINUED
Start: 2017-10-06 | End: 2017-10-06 | Stop reason: HOSPADM

## 2017-10-06 RX ORDER — HEPARIN SODIUM 1000 [USP'U]/ML
6000 INJECTION, SOLUTION INTRAVENOUS; SUBCUTANEOUS ONCE
Status: COMPLETED | OUTPATIENT
Start: 2017-10-06 | End: 2017-10-06

## 2017-10-06 RX ORDER — POTASSIUM CHLORIDE 1500 MG/1
20 TABLET, EXTENDED RELEASE ORAL ONCE
Status: COMPLETED | OUTPATIENT
Start: 2017-10-06 | End: 2017-10-06

## 2017-10-06 RX ORDER — FENTANYL CITRATE 50 UG/ML
25-50 INJECTION, SOLUTION INTRAMUSCULAR; INTRAVENOUS EVERY 5 MIN PRN
Status: DISCONTINUED | OUTPATIENT
Start: 2017-10-06 | End: 2017-10-06 | Stop reason: HOSPADM

## 2017-10-06 RX ORDER — NALOXONE HYDROCHLORIDE 0.4 MG/ML
.1-.4 INJECTION, SOLUTION INTRAMUSCULAR; INTRAVENOUS; SUBCUTANEOUS
Status: DISCONTINUED | OUTPATIENT
Start: 2017-10-06 | End: 2017-10-06 | Stop reason: HOSPADM

## 2017-10-06 RX ORDER — AMLODIPINE BESYLATE 10 MG/1
10 TABLET ORAL DAILY
Status: DISCONTINUED | OUTPATIENT
Start: 2017-10-06 | End: 2017-10-08

## 2017-10-06 RX ADMIN — HYDROMORPHONE HYDROCHLORIDE 0.2 MG: 1 INJECTION, SOLUTION INTRAMUSCULAR; INTRAVENOUS; SUBCUTANEOUS at 10:20

## 2017-10-06 RX ADMIN — PENTOXIFYLLINE 400 MG: 400 TABLET, FILM COATED, EXTENDED RELEASE ORAL at 16:57

## 2017-10-06 RX ADMIN — NALOXONE HYDROCHLORIDE 0.4 MG: 0.4 INJECTION, SOLUTION INTRAMUSCULAR; INTRAVENOUS; SUBCUTANEOUS at 11:09

## 2017-10-06 RX ADMIN — FENTANYL CITRATE 50 MCG: 50 INJECTION, SOLUTION INTRAMUSCULAR; INTRAVENOUS at 11:00

## 2017-10-06 RX ADMIN — CHOLECALCIFEROL CAP 125 MCG (5000 UNIT) 5000 UNITS: 125 CAP at 16:57

## 2017-10-06 RX ADMIN — HEPARIN SODIUM 5000 UNITS: 5000 INJECTION, SOLUTION INTRAVENOUS; SUBCUTANEOUS at 16:45

## 2017-10-06 RX ADMIN — HEPARIN SODIUM 6000 UNITS: 1000 INJECTION, SOLUTION INTRAVENOUS; SUBCUTANEOUS at 11:30

## 2017-10-06 RX ADMIN — IPRATROPIUM BROMIDE AND ALBUTEROL SULFATE 3 ML: .5; 3 SOLUTION RESPIRATORY (INHALATION) at 12:28

## 2017-10-06 RX ADMIN — MEROPENEM 500 MG: 500 INJECTION, POWDER, FOR SOLUTION INTRAVENOUS at 15:39

## 2017-10-06 RX ADMIN — DEXTROSE AND SODIUM CHLORIDE: 5; 450 INJECTION, SOLUTION INTRAVENOUS at 13:23

## 2017-10-06 RX ADMIN — BUMETANIDE 2 MG: 0.25 INJECTION INTRAMUSCULAR; INTRAVENOUS at 23:52

## 2017-10-06 RX ADMIN — IPRATROPIUM BROMIDE AND ALBUTEROL SULFATE 3 ML: .5; 3 SOLUTION RESPIRATORY (INHALATION) at 07:15

## 2017-10-06 RX ADMIN — LIDOCAINE HYDROCHLORIDE 160 MG: 10 INJECTION, SOLUTION INFILTRATION; PERINEURAL at 11:31

## 2017-10-06 RX ADMIN — METOPROLOL TARTRATE 25 MG: 25 TABLET, FILM COATED ORAL at 10:00

## 2017-10-06 RX ADMIN — Medication 250 MG: at 23:42

## 2017-10-06 RX ADMIN — IPRATROPIUM BROMIDE AND ALBUTEROL SULFATE 3 ML: .5; 3 SOLUTION RESPIRATORY (INHALATION) at 20:04

## 2017-10-06 RX ADMIN — CALCITRIOL 0.25 MCG: 0.25 CAPSULE, LIQUID FILLED ORAL at 16:56

## 2017-10-06 RX ADMIN — HYDRALAZINE HYDROCHLORIDE 10 MG: 20 INJECTION INTRAMUSCULAR; INTRAVENOUS at 02:24

## 2017-10-06 RX ADMIN — AMLODIPINE BESYLATE 10 MG: 10 TABLET ORAL at 16:56

## 2017-10-06 RX ADMIN — HEPARIN SODIUM 5000 UNITS: 5000 INJECTION, SOLUTION INTRAVENOUS; SUBCUTANEOUS at 23:43

## 2017-10-06 RX ADMIN — METOPROLOL TARTRATE 25 MG: 25 TABLET, FILM COATED ORAL at 20:43

## 2017-10-06 RX ADMIN — MIDAZOLAM HYDROCHLORIDE 1 MG: 1 INJECTION, SOLUTION INTRAMUSCULAR; INTRAVENOUS at 11:00

## 2017-10-06 RX ADMIN — Medication 250 MG: at 16:57

## 2017-10-06 RX ADMIN — LIDOCAINE HYDROCHLORIDE 160 MG: 10 INJECTION, SOLUTION EPIDURAL; INFILTRATION; INTRACAUDAL; PERINEURAL at 11:31

## 2017-10-06 RX ADMIN — BUMETANIDE 2 MG: 0.25 INJECTION INTRAMUSCULAR; INTRAVENOUS at 10:04

## 2017-10-06 RX ADMIN — POTASSIUM CHLORIDE 20 MEQ: 1500 TABLET, EXTENDED RELEASE ORAL at 16:57

## 2017-10-06 RX ADMIN — BUMETANIDE 2 MG: 0.25 INJECTION INTRAMUSCULAR; INTRAVENOUS at 17:03

## 2017-10-06 NOTE — PLAN OF CARE
Problem: Patient Care Overview  Goal: Plan of Care/Patient Progress Review  Outcome: Declining  Pt drowsy, wakes briefly but has slept most of the last 12 hours. CAM negative but disoriented to situation. Has been on BiPap for most of the shift with FiO2 at 80%. Trial of high flow nasal cannula 3 times overnight. Pt RR increased and desatte while on NC  Only able to tolerate high flow for 5-15 minutes.   Pt remains tachy and has been hypertensive. MD notified and orders received. BP treated with PRN hydralazine.

## 2017-10-06 NOTE — TELEPHONE ENCOUNTER
Admitted and initiated HD 10/2. Will confirm next week this continues then notify PEÑA Jaimesman that we will not be managing her anemia    Call date: 10/13    Yesy Samaniego, PharmD, BCACP  [phone]  October 6, 2017

## 2017-10-06 NOTE — PROCEDURES
RADIOLOGY PROCEDURE NOTE  Patient name: Kathryn Banks  MRN: [id number]  : 1940    Pre-procedure diagnosis: Need for hemodialysis  Post-procedure diagnosis: Same    Procedure Date/Time: 2017  12:01 PM  Procedure: Right IJ tunneled hemodialysis catheter, 19 cm.  Tip at RA/SVC junction.  Heparinized.  Ready for use.  After sedation, patient was flat with desaturations to mid 60's.  With facemask and 15 L O2, patient did not return to baseline saturations (90's).  Therefore, bipap replaced and patient's breathing status normalized throughout procedure.  Estimated blood loss: None  Specimen(s) collected with description: None  The patient tolerated the procedure well with no immediate complications.  Significant findings:  Please see above.    See imaging dictation for procedural details.    Provider name: Destin Echavarria  Assistant(s):None

## 2017-10-06 NOTE — PROGRESS NOTES
[facility] Intensive Care Unit  Comprehensive Daily ICU Note  October 6, 2017    Kathryn Banks MRN# [id number]   Age: 77 year old YOB: 1940     Date of Admission: 10/2/2017  Primary care provider: Dheeraj Jj          Diagnosis/Problem list:          Patient Active Problem List      Diagnosis     Septic shock (H)     Anemia in stage 5 chronic kidney disease (H)     CKD (chronic kidney disease) stage 5, GFR less than 15 ml/min (H)     Volume overload     Pulmonary edema     Acidosis     Chronic heart failure (H) with preserved EF     Type 2 diabetes mellitus with other circulatory complications     CAD - NSTEMI, CABG x 5 2007, angio in 2013 with patent grafts other than occluded graft to PL       CODE STATUS: Full Code         Assessment and Plan:     Kathryn Banks is a 77 year old female admitted on 10/2/2017 and currently in the ICU for/with the problem(s) listed above. Based on my exam and review of the data,  the plan for the next 24h is as follows:    Neurology:   1. Altered mental status -- Multifactorial, most likely related to uremia, medications, hypoxia. Patient not fully oriented to situation, but is pleasant and non-aggressive towards staff. Continue to provide orientation and redirection as appropriate.    2. Pain -- Tylenol. Cautious use of dilaudid.    Pulmonary:   1. Acute respiratory failure, ANABEL -- Pulmonary edema 2/2 decompensated HF and pneumonia. Labile respiratory status, only able to tolerate short periods of high flow NC O2. She has a history of ANABEL requiring CPAP and was started on supplemental daytime oxygen 8/2017. Given her history of respiratory compromise, it is unlikely that she will be able to tolerate anything less intensive than CPAP/BiPAP while sleeping.   - Wean oxygen as tolerated only when awake; leave on BiPAP overnight due to ANABEL  - Scheduled duonebs, PRN albuterol neb    2. Pneumonia -- Sputum cultures with small amount of candida and coag  negative staph. Hypoxia, worsening RUL infiltrate on CXR, productive cough. Increasing productive cough requiring suctioning of expectorate. Given her femoral dialysis catheter, she is unable to sit fully upright which is making it difficult for her to clear her secretions and is contributing to her poor respiratory status. Was on IV vancomycin 10/2-10/4 and IV pip/tazo 10/2-10/5 but given her worsening infiltrates and declining respiratory status she was transitioned to IV meropenem on 10/5.   - IV meropenem  - Suction expectorate PRN    Cardiovascular system:   1. Hypertension: -175 systolic,  diastolic. Baseline hypertension managed with amlodipine and metoprolol. PTA metoprolol restarted 10/5, but patient had diastolic BP in the 120s overnight requiring PRN hydralazine. Will monitor BP after patient returns to unit after IR procedure; if she continues to remain hypertensive on scheduled metoprolol and PRN hydralazine, we will add back in her PTA amlodipine.     2. Paroxysmal Atrial Fibrillation: went into A. Fib with RVR 10/4, was converted back to SR with diltiazem drip. Continues to be in SR with occasional PVCs.  - Will continue to hold warfarin while patient in ICU    3. HFpEF: Echo 10/2 unchanged from prior with EF 60-65%, diastolic dysfunction, and mild-moderately reduced RV function. Currently on diuresis and receiving daily hemodialysis.    Renal/Electrolytes:  1. ESRD -- hypovolemia worsened baseline CKD V. Creatinine improved from 5.24 to 2.9 with initiation of hemodialysis. IR placed tunneled RIJ dialysis catheter 10/6. I anticipate she will need a plan for long term dialysis out of hospital.   - Nephrology consulted, appreciate involvement    2. Oliguria -- improved with Bumex, UOP last 24h 1250ml. Cee in place.  - Strict I/Os    3. Hypokalemia -- Potassium stable at 3.2, required repletion with 20-40mEq KCl daily since initiation of dialysis.    ID:  1. Pneumonia -- given worsening of  RUL infiltrate on CXR and decline in respiratory status, was switched from IV pip/tazo to IV meropenem 10/5.     2. Hx of C. Difficile -- Diagnosed 9/14/17 at [facility]. Initially treated with PO metronidazole without response and was transition to oral vancomycin on 9/22. She completed a 14 day course of oral vancomycin on 10/6/17. Negative stool C difficile toxin PCR this admission.    GI/Nutrition  1. Diet -- Dialysis diet with Nepro oral supplement.     Endocrine:   1. Diabetes -- BG range 130-170  - Q4H glucose checks  - Insulin to keep BG <150    MSK/Rheumatology:  1. Bilateral lower extremity ulceration -- improving with wound cares    ICU prophylaxis:   SQH Q8H    Restrain:  Restrain for medical healing needed :NO.         Key treatment goals for next 24 hours:   1. IR to place tunneled hemodialysis catheter and remove temporary femoral dialysis catheter  2. Monitor oxygenation levels and wean O2 as appropriate         Medications:     Current Facility-Administered Medications Ordered in Epic   Medication Dose Route Frequency Last Rate Last Dose     hydrALAZINE (APRESOLINE) injection 10 mg  10 mg Intravenous Q6H PRN   10 mg at 10/06/17 0224     dextrose 5% and 0.45% NaCl infusion   Intravenous Continuous 20 mL/hr at 10/06/17 1323       insulin aspart (NovoLOG) inj (RAPID ACTING)  1-7 Units Subcutaneous Q4H   1 Units at 10/06/17 1321     ipratropium - albuterol 0.5 mg/2.5 mg/3 mL (DUONEB) neb solution 3 mL  1 vial Nebulization TID   3 mL at 10/06/17 1228     metoprolol (LOPRESSOR) tablet 25 mg  25 mg Oral BID   25 mg at 10/06/17 1000     pentoxifylline (TRENtal) CR tablet 400 mg  400 mg Oral Daily   Stopped at 10/05/17 1214     lidocaine (LMX4) cream   Topical Once PRN         heparin lock flush 10 UNIT/ML injection 2-5 mL  2-5 mL Intracatheter Once PRN         sodium chloride (PF) 0.9% PF flush 10-20 mL  10-20 mL Intracatheter Q1H PRN   10 mL at 10/06/17 1329     sodium chloride (PF) 0.9% PF flush 10 mL  10 mL  Intracatheter Q8H   10 mL at 10/06/17 0224     meropenem (MERREM) 500 mg vial to attach to  mL bag for ADULTS or 25 mL bag for PEDS  500 mg Intravenous Q24H   500 mg at 10/05/17 1837     bumetanide (BUMEX) injection 2 mg  2 mg Intravenous Q8H   2 mg at 10/06/17 1004     cholecalciferol (vitamin D3) capsule CAPS 5,000 Units  5,000 Units Oral Daily         calcitRIOL (ROCALTROL) capsule 0.25 mcg  0.25 mcg Oral Daily         HYDROmorphone (PF) (DILAUDID) injection 0.2 mg  0.2 mg Intravenous Q3H PRN   0.2 mg at 10/06/17 1020     acetaminophen (TYLENOL) tablet 325-650 mg  325-650 mg Oral Q6H PRN   650 mg at 10/03/17 1027     heparin sodium PF injection 5,000 Units  5,000 Units Subcutaneous Q8H   Stopped at 10/06/17 0600     naloxone (NARCAN) injection 0.1-0.4 mg  0.1-0.4 mg Intravenous Q2 Min PRN   0.4 mg at 10/06/17 1109     albuterol neb solution 2.5 mg  2.5 mg Nebulization Q4H PRN   2.5 mg at 10/04/17 1315     dextrose 10 % 1,000 mL infusion   Intravenous Continuous PRN         glucose 40 % gel 15-30 g  15-30 g Oral Q15 Min PRN        Or     dextrose 50 % injection 25-50 mL  25-50 mL Intravenous Q15 Min PRN   25 mL at 10/02/17 1209    Or     glucagon injection 1 mg  1 mg Subcutaneous Q15 Min PRN         FIRST-VANCOMYCIN 50 solution 250 mg  250 mg Oral 4x Daily   250 mg at 10/05/17 2110     lidocaine (XYLOCAINE) 2 % topical gel   Topical Q4H PRN         - MEDICATION INSTRUCTIONS for Dialysis Patients -   Does not apply See Admin Instructions         No current Epic-ordered outpatient prescriptions on file.            Lines/ tubes   The  lines and appliances are currently used in this patient   PICC or CVP: YES  Cee cath: YES  Arterial line: NO  ETT: NO  N/O gastric tube::  NO  Feeding tube: NO  Other device(s): RIJ tunneled dialysis catheter    All the above appliances including central lines were reviewed today and assessed for the need to be continued or placed de jimbo to care for this critically ill  patient.         Physical Exam:   Vital Sign Ranges  Temperature Temp  Av.4  F (36.9  C)  Min: 97.9  F (36.6  C)  Max: 99.6  F (37.6  C)   Blood pressure Systolic (24hrs), Av , Min:121 , Max:175        Diastolic (24hrs), Av, Min:73, Max:128      Pulse No Data Recorded   Respirations Resp  Av  Min: 10  Max: 51   Pulse oximetry SpO2  Av.5 %  Min: 85 %  Max: 99 %     General:  Awake and alert female, chronically ill appearing, lying in bed in no acute distress with BiPAP in place  Neuro: Alert, moderately confused patient. PERRL, no focal neurologic deficits on gross neuro exam.   HEENT: Not orally intubated, no N/O gastric tube present. Mucous membranes moist.   CV: Regular rate, SR with occasional PVCs. Neck veins non-distended. No gallop or murmur. Radial pulses 2+ and regular.   Pulm:Bilateral chest symmetrical chest expansion on inspection. Decreased coarse expiratory rhonchi throughout bilateral lungs compared to yesterday. Soft breath sounds in  LLL.  Abdomen: Abdomen soft, non-tender.  BS normal.  No masses, organomegaly. Cee present.   Extremities: No edema. Bilateral lower extremity ulcerations improving with decreased surrounding erythema. Dark discoloration of non-ulcerated skin of BLE.   Lines: No erythema or discharge present at line sites.         Ancillary Data:   All laboratory and imaging data reviewed.         Hemodynamics/I & O:   BP - Mean:  [105-134] 114    Intake/Output Summary (Last 24 hours) at 10/06/17 0950  Last data filed at 10/06/17 0747   Gross per 24 hour   Intake              200 ml   Output             3755 ml   Net            -3555 ml       Mechanical ventilation   FiO2 (%): 100 %  Resp: 13    Recent Labs  Lab 10/04/17  1040 10/01/17  2145   O2PER Canceled:  Specimen improperly labeled. 21              Labs and EKG   All lab results reviewed past 24 hours  All imaging results reviewed past 24 hours  Chemistry:     Recent Labs  Lab 10/06/17  1010 10/06/17  0945  10/05/17  0350 10/04/17  0440 10/03/17  0425 10/02/17  2100   NA  --   --  145* 144 144 146*   POTASSIUM 3.2* Canceled, Test credited 3.2* 3.2* 3.9 5.1   CHLORIDE  --   --  108 109 112* 119*   CO2  --   --  26 23 21 14*   BUN  --   --  25 26 37* 61*   CR  --   --  2.90* 3.19* 3.78* 5.24*   GLC  --   --  168* 157* 141* 163*   MALICK  --   --  7.7* 6.6* 6.6* 6.5*       Recent Labs  Lab 10/04/17  0440 10/01/17  2145   AST  --  16   ALT  --  16   BILITOTAL  --  0.3   ALBUMIN  --  2.0*   PROTTOTAL 4.8* 5.5*   ALKPHOS  --  197*     Hematology:    Recent Labs  Lab 10/05/17  0350 10/04/17  0440 10/03/17  1755 10/03/17  0820 10/03/17  0425 10/02/17  1440 10/02/17  1045 10/01/17  2145   WBC 11.8* 9.0  --   --  10.8 11.6* 10.1 6.7   RBC 2.96* 2.62*  --   --  2.50* 3.01* 3.08* 3.13*   HGB 7.9* 7.1* 7.5* 6.3* 6.6* 7.9* 8.1* 8.1*   HCT 24.7* 21.6*  --   --  20.3* 24.7* 25.5* 26.6*    182  --   --  219 300 307 257       Recent Labs  Lab 10/06/17  0930 10/03/17  0425 10/02/17  2100 10/02/17  1045   INR 0.95 1.82* 2.37* 6.00*      Cultures:    Recent Labs  Lab 10/02/17  1430 10/01/17  2255 10/01/17  2157 10/01/17  2145   CULT Light growthCandida albicans / dubliniensisCandida albicans and Brook dubliniensis are not routinely speciatedSusceptibility testing not routinely done*  Light growthCoagulase negative StaphylococcusSusceptibility testing not routinely done* No growth No growth after 5 days No growth after 5 days     Blood culture 10/1: no growth x2 samples  Urine culture 10/1: no growth       Imaging Studies:   CXR 10/.5:  Since previous one, increased density of RUL infiltrates. LLL pleural effusion stable in size. No evidence of pneumothorax by my read.     Scribed by June Ansari MS4 for Dr. Dwaine Tabor MD    Staff summary  DOS: 10/6/2017    All pertinent labs, imaging studies, physical examination, and medications have been reviewed by myself and the SICU team. The patient is critically ill by my examination  today and requires continued ICU cares. A total of 30 minutes critical care time, exclusive of procedures, spent in the ICU in the management and care of this patient.    CNS: Sleepy at my visit as she has just returned from IR.  - Pain: APAP prn, hydromorphone if needed.  Pulm: On BiPAP 12/5/80, alternating with high-flow O2.  - Pulmonary insufficiency: Wean O2 as tolerated, continue pulmonary toilet. Mobilize to chair when awake to see if this helps respiratory status. BiPAP when asleep.  - Pneumonia: Continue meropenem for now.  - Home duonebs.  CV: 90s-100s, 110s-150s/60s-80s.  - Hypertension: Continue metoprolol; may add amlodipine back today as her BP settles out post-procedure.  - Atrial fibrillation: Converted back to NSR.  - Systolic heart failure: Improving with volume removal.  FEN/GI: Abdomen benign.  - Nutrition: Diet as respiratory status allows, nutritionist following. Supplements added.  : UOP improving overall with bumetanide. Is ~6L down since admission.  - Fluid overload: Secondary to CHF, renal disease. Improving.  - ESRD: Now requiring dialysis; tunneled line placed today. Appreciate nephrology assistance.  - Na trending up with diuresis/dialysis. Added 1/2NS at TKO to help cover insensible losses as she isn't drinking much.  Heme: Hb 7.7 (7.9).  - Anemia: No indication to transfuse.  Musculoskeletal: Extremities warm, well-perfused.   Endocrine: Glucose 137-146.  - DM2: SSI prn.  ID: Afebrile, WBCs 8.2.  - Pneumonia: Meets clinical criteria, no culture results. Continue meropenem for now.  - Nothing new on cultures.  SICU: SQH.    I agree with the findings and plan of care as documented by Ms Ansari, MS4, in our jointly developed note.    Dwaine Tabor MD, PhD  Surgical critical care  October 6, 2017, 2:04 PM

## 2017-10-06 NOTE — PROGRESS NOTES
Pt has been on bipap and HFNC t/o the day.  Will continue to wean FIO2 as able.  Karlo Zavala  10/6/2017

## 2017-10-06 NOTE — PROGRESS NOTES
Renal Medicine Progress Note            Assessment/Plan:     1. CKD V-->like ESRD                         -started HD 10/2    -RIJ tunneled placed 10/6      2. Infection/PNA                         -on meropenem      3. Pulmonary edema/respitoary failure. CXR with vascular congestion and large L pleural effusion. Still requiring 100% O2 via HF.                          -TTE with moderate RV dysfunction, + DD and pulmonary hypertenison                         -moderate left pleural effusion      4. Anemia. FE storage is on the low side.      5. CKD-MBD. Hypocalcemia and 2HPTH     6. Recurrent Afib. Pt had an ablation earlier this year    7. Hypernatremia. Encourage free water intake.    Plan.   1. 20 meq KCl  2. IV FE and SUELLEN with HD  3. Dialysis Saturday        Interval History:     Appreciate IR placing RIJ tunneled and removal of R temp CVC. Pt is sleeping with BiPAP on. No other events report by RN.           Medications and Allergies:       potassium chloride  20 mEq Oral Once     insulin aspart  1-7 Units Subcutaneous Q4H     ipratropium - albuterol 0.5 mg/2.5 mg/3 mL  1 vial Nebulization TID     metoprolol  25 mg Oral BID     pentoxifylline  400 mg Oral Daily     sodium chloride (PF)  10 mL Intracatheter Q8H     meropenem  500 mg Intravenous Q24H     bumetanide  2 mg Intravenous Q8H     cholecalciferol  5,000 Units Oral Daily     calcitRIOL  0.25 mcg Oral Daily     heparin  5,000 Units Subcutaneous Q8H     vancomycin  250 mg Oral 4x Daily     - MEDICATION INSTRUCTIONS for Dialysis Patients -   Does not apply See Admin Instructions        Allergies   Allergen Reactions     Ciprofloxacin Nausea and Vomiting     Zofran did not help     Oxycodone Visual Disturbance     Delusions, blackouts      Lisinopril Cough     Sulfa Drugs GI Disturbance     LOSS OF TASTE            Physical Exam:   Vitals were reviewed  Heart Rate: 96, Blood pressure 110/67, pulse 100, temperature 98.6  F (37  C), temperature source  Axillary, resp. rate 13, weight 71.8 kg (158 lb 4.6 oz), SpO2 98 %, not currently breastfeeding.    Wt Readings from Last 3 Encounters:   10/06/17 71.8 kg (158 lb 4.6 oz)   10/01/17 77.3 kg (170 lb 8 oz)   09/22/17 77.1 kg (170 lb)       Intake/Output Summary (Last 24 hours) at 10/06/17 1542  Last data filed at 10/06/17 1400   Gross per 24 hour   Intake              200 ml   Output             3875 ml   Net            -3675 ml       GENERAL APPEARANCE: sleeping  HEENT:  BiPAP in place  RESP: Poor airflow. No wheezes.  CV: irregular   ABDOMEN: soft, NT  EXTREMITIES/SKIN: no rashes/lesions on observed skin; Edema much better         Data:     CBC RESULTS:     Recent Labs  Lab 10/06/17  1330 10/05/17  0350 10/04/17  0440 10/03/17  1755 10/03/17  0820 10/03/17  0425 10/02/17  1440 10/02/17  1045   WBC 8.2 11.8* 9.0  --   --  10.8 11.6* 10.1   RBC 2.83* 2.96* 2.62*  --   --  2.50* 3.01* 3.08*   HGB 7.7* 7.9* 7.1* 7.5* 6.3* 6.6* 7.9* 8.1*   HCT 24.4* 24.7* 21.6*  --   --  20.3* 24.7* 25.5*    197 182  --   --  219 300 307       Basic Metabolic Panel:    Recent Labs  Lab 10/06/17  1330 10/06/17  1010 10/06/17  0930 10/05/17  0350 10/04/17  0440 10/03/17  0425 10/02/17  2100 10/02/17  1440   *  --   --  145* 144 144 146* 146*   POTASSIUM 3.4 3.2* Canceled, Test credited 3.2* 3.2* 3.9 5.1 4.9   CHLORIDE 109  --   --  108 109 112* 119* 119*   CO2 26  --   --  26 23 21 14* 15*   BUN 19  --   --  25 26 37* 61* 59*   CR 2.98*  --   --  2.90* 3.19* 3.78* 5.24* 5.17*   *  --   --  168* 157* 141* 163* 128*   MALICK 7.5*  --   --  7.7* 6.6* 6.6* 6.5* 6.5*       INR  Recent Labs  Lab 10/06/17  0930 10/03/17  0425 10/02/17  2100 10/02/17  1045   INR 0.95 1.82* 2.37* 6.00*      Attestation:   I have reviewed today's relevant vital signs, notes, medications, labs and imaging.    Davey Palma MD  [facility] - Nephrology  Office phone :[phone]  Pager: [phone]

## 2017-10-06 NOTE — PROGRESS NOTES
Tele hub notified of pt's elevated BP. PRN hydralazine given earlier, unable to give additional dose yet.

## 2017-10-06 NOTE — PROGRESS NOTES
Interventional Radiology Intra-procedural Nursing Note    Patient Name: Kathryn Banks  Medical Record Number: [id number]  Today's Date: October 6, 2017    Start Time: 1115  End of procedure time: 1135  Procedure: Right Tunneled Catheter  Report given to: Lamont RN, ICU  Time pt departs:  1145  : n/a    Other Notes: Patient into IR suite 2 at 1050 on 15L via oxymask. Patient awake and alert, signed consent with Dr. Echavarria. Right TL IJ CVC taken out per Dr. Echavarria. VSS, normal sinus rhythm. Patient to table, desated to 60% on 15L after versed and fentanyl given. Narcan 0.4 mg given. Bagged patient and called RT. BIPAP placed on patient at 100%. Dr. Echavarria in room, timeout and procedure started. New 14.5F double lumen tunneled right catheter placed in IJ. Dressing clean, dry and intact. No complications. Versed 1 mg and fentanyl 50 mcg. Hemodialysis catheter taken out of right femoral groin without any issues, dressing clean, dry and intact. Debrief with Dr. Echavarria, no complications. Patient back to ICU via cart with RT and flyer.    Karma Turner RN

## 2017-10-06 NOTE — PLAN OF CARE
Problem: Patient Care Overview  Goal: Plan of Care/Patient Progress Review  Outcome: No Change  Pt alert, oriented. Denies pain. On bipap for most of day to maintain sats >90% 12/5 with FIO2 70-90%. Tolerated hi-jose david for ~ 3 hrs at 97% FIO2 40 LPM.  Lungs coarse throughout fields. Sinus tachycardia on tele with PVCs. NPO secondary to high O2 needs. No BM this shift. Urine output adequate. Wound dressing per plan of care. Dialysis with 2.5 L removed. Plan for tunneled cath placement tomorrow. Monitor.

## 2017-10-07 ENCOUNTER — APPOINTMENT (OUTPATIENT)
Dept: GENERAL RADIOLOGY | Facility: CLINIC | Age: 77
DRG: 871 | End: 2017-10-07
Attending: INTERNAL MEDICINE
Payer: MEDICARE

## 2017-10-07 LAB
ALBUMIN SERPL-MCNC: 2.1 G/DL (ref 3.4–5)
ANION GAP SERPL CALCULATED.3IONS-SCNC: 10 MMOL/L (ref 3–14)
ANION GAP SERPL CALCULATED.3IONS-SCNC: 7 MMOL/L (ref 3–14)
BACTERIA SPEC CULT: NORMAL
BACTERIA SPEC CULT: NORMAL
BUN SERPL-MCNC: 21 MG/DL (ref 7–30)
BUN SERPL-MCNC: 23 MG/DL (ref 7–30)
CA-I BLD-MCNC: 4.5 MG/DL (ref 4.4–5.2)
CALCIUM SERPL-MCNC: 7.6 MG/DL (ref 8.5–10.1)
CALCIUM SERPL-MCNC: 7.7 MG/DL (ref 8.5–10.1)
CHLORIDE SERPL-SCNC: 107 MMOL/L (ref 94–109)
CHLORIDE SERPL-SCNC: 109 MMOL/L (ref 94–109)
CO2 SERPL-SCNC: 27 MMOL/L (ref 20–32)
CO2 SERPL-SCNC: 31 MMOL/L (ref 20–32)
CREAT SERPL-MCNC: 2.93 MG/DL (ref 0.52–1.04)
CREAT SERPL-MCNC: 3.51 MG/DL (ref 0.52–1.04)
ERYTHROCYTE [DISTWIDTH] IN BLOOD BY AUTOMATED COUNT: 17.9 % (ref 10–15)
GFR SERPL CREATININE-BSD FRML MDRD: 13 ML/MIN/1.7M2
GFR SERPL CREATININE-BSD FRML MDRD: 16 ML/MIN/1.7M2
GLUCOSE BLDC GLUCOMTR-MCNC: 139 MG/DL (ref 70–99)
GLUCOSE BLDC GLUCOMTR-MCNC: 148 MG/DL (ref 70–99)
GLUCOSE BLDC GLUCOMTR-MCNC: 151 MG/DL (ref 70–99)
GLUCOSE BLDC GLUCOMTR-MCNC: 155 MG/DL (ref 70–99)
GLUCOSE BLDC GLUCOMTR-MCNC: 159 MG/DL (ref 70–99)
GLUCOSE BLDC GLUCOMTR-MCNC: 166 MG/DL (ref 70–99)
GLUCOSE SERPL-MCNC: 146 MG/DL (ref 70–99)
GLUCOSE SERPL-MCNC: 211 MG/DL (ref 70–99)
HCT VFR BLD AUTO: 24 % (ref 35–47)
HGB BLD-MCNC: 7.6 G/DL (ref 11.7–15.7)
INTERPRETATION ECG - MUSE: NORMAL
Lab: NORMAL
Lab: NORMAL
MAGNESIUM SERPL-MCNC: 1.6 MG/DL (ref 1.6–2.3)
MCH RBC QN AUTO: 27.2 PG (ref 26.5–33)
MCHC RBC AUTO-ENTMCNC: 31.7 G/DL (ref 31.5–36.5)
MCV RBC AUTO: 86 FL (ref 78–100)
PHOSPHATE SERPL-MCNC: 2.7 MG/DL (ref 2.5–4.5)
PLATELET # BLD AUTO: 144 10E9/L (ref 150–450)
POTASSIUM SERPL-SCNC: 3.2 MMOL/L (ref 3.4–5.3)
POTASSIUM SERPL-SCNC: 3.6 MMOL/L (ref 3.4–5.3)
POTASSIUM SERPL-SCNC: 3.6 MMOL/L (ref 3.4–5.3)
PROCALCITONIN SERPL-MCNC: 0.75 NG/ML
RBC # BLD AUTO: 2.79 10E12/L (ref 3.8–5.2)
SODIUM SERPL-SCNC: 144 MMOL/L (ref 133–144)
SODIUM SERPL-SCNC: 147 MMOL/L (ref 133–144)
SPECIMEN SOURCE: NORMAL
SPECIMEN SOURCE: NORMAL
WBC # BLD AUTO: 7.2 10E9/L (ref 4–11)

## 2017-10-07 PROCEDURE — P9041 ALBUMIN (HUMAN),5%, 50ML: HCPCS | Performed by: INTERNAL MEDICINE

## 2017-10-07 PROCEDURE — 40000275 ZZH STATISTIC RCP TIME EA 10 MIN

## 2017-10-07 PROCEDURE — 25000132 ZZH RX MED GY IP 250 OP 250 PS 637: Mod: GY | Performed by: SURGERY

## 2017-10-07 PROCEDURE — 25000128 H RX IP 250 OP 636: Performed by: SURGERY

## 2017-10-07 PROCEDURE — 20000003 ZZH R&B ICU

## 2017-10-07 PROCEDURE — 25000128 H RX IP 250 OP 636: Performed by: INTERNAL MEDICINE

## 2017-10-07 PROCEDURE — 00000146 ZZHCL STATISTIC GLUCOSE BY METER IP

## 2017-10-07 PROCEDURE — S0169 CALCITROL: HCPCS | Mod: GY | Performed by: INTERNAL MEDICINE

## 2017-10-07 PROCEDURE — 25000132 ZZH RX MED GY IP 250 OP 250 PS 637: Mod: GY | Performed by: INTERNAL MEDICINE

## 2017-10-07 PROCEDURE — 84132 ASSAY OF SERUM POTASSIUM: CPT | Performed by: INTERNAL MEDICINE

## 2017-10-07 PROCEDURE — 40000239 ZZH STATISTIC VAT ROUNDS

## 2017-10-07 PROCEDURE — A9270 NON-COVERED ITEM OR SERVICE: HCPCS | Mod: GY | Performed by: SURGERY

## 2017-10-07 PROCEDURE — A9270 NON-COVERED ITEM OR SERVICE: HCPCS | Mod: GY | Performed by: INTERNAL MEDICINE

## 2017-10-07 PROCEDURE — 25000125 ZZHC RX 250: Performed by: SURGERY

## 2017-10-07 PROCEDURE — 83735 ASSAY OF MAGNESIUM: CPT | Performed by: INTERNAL MEDICINE

## 2017-10-07 PROCEDURE — 94640 AIRWAY INHALATION TREATMENT: CPT

## 2017-10-07 PROCEDURE — S0171 BUMETANIDE 0.5 MG: HCPCS | Performed by: INTERNAL MEDICINE

## 2017-10-07 PROCEDURE — 82330 ASSAY OF CALCIUM: CPT | Performed by: INTERNAL MEDICINE

## 2017-10-07 PROCEDURE — 80048 BASIC METABOLIC PNL TOTAL CA: CPT | Performed by: SURGERY

## 2017-10-07 PROCEDURE — 84145 PROCALCITONIN (PCT): CPT | Performed by: INTERNAL MEDICINE

## 2017-10-07 PROCEDURE — 85027 COMPLETE CBC AUTOMATED: CPT | Performed by: SURGERY

## 2017-10-07 PROCEDURE — 94660 CPAP INITIATION&MGMT: CPT

## 2017-10-07 PROCEDURE — 25000125 ZZHC RX 250: Performed by: INTERNAL MEDICINE

## 2017-10-07 PROCEDURE — 90937 HEMODIALYSIS REPEATED EVAL: CPT

## 2017-10-07 PROCEDURE — P9041 ALBUMIN (HUMAN),5%, 50ML: HCPCS | Performed by: SURGERY

## 2017-10-07 PROCEDURE — 71010 XR CHEST PORT 1 VW: CPT

## 2017-10-07 PROCEDURE — 94640 AIRWAY INHALATION TREATMENT: CPT | Mod: 76

## 2017-10-07 PROCEDURE — 40000257 ZZH STATISTIC CONSULT NO CHARGE VASC ACCESS

## 2017-10-07 PROCEDURE — 80069 RENAL FUNCTION PANEL: CPT | Performed by: INTERNAL MEDICINE

## 2017-10-07 RX ORDER — ALBUMIN, HUMAN INJ 5% 5 %
12.5 SOLUTION INTRAVENOUS ONCE
Status: COMPLETED | OUTPATIENT
Start: 2017-10-07 | End: 2017-10-07

## 2017-10-07 RX ORDER — METOPROLOL TARTRATE 1 MG/ML
2.5 INJECTION, SOLUTION INTRAVENOUS ONCE
Status: COMPLETED | OUTPATIENT
Start: 2017-10-07 | End: 2017-10-07

## 2017-10-07 RX ORDER — CLONAZEPAM 0.5 MG/1
0.25 TABLET ORAL 2 TIMES DAILY PRN
Status: DISCONTINUED | OUTPATIENT
Start: 2017-10-07 | End: 2017-10-18 | Stop reason: HOSPADM

## 2017-10-07 RX ORDER — ALBUMIN, HUMAN INJ 5% 5 %
50 SOLUTION INTRAVENOUS ONCE
Status: DISCONTINUED | OUTPATIENT
Start: 2017-10-07 | End: 2017-10-10

## 2017-10-07 RX ORDER — POTASSIUM CHLORIDE 7.45 MG/ML
10 INJECTION INTRAVENOUS ONCE
Status: COMPLETED | OUTPATIENT
Start: 2017-10-07 | End: 2017-10-07

## 2017-10-07 RX ORDER — ALBUMIN, HUMAN INJ 5% 5 %
250 SOLUTION INTRAVENOUS
Status: DISCONTINUED | OUTPATIENT
Start: 2017-10-07 | End: 2017-10-07

## 2017-10-07 RX ORDER — METOPROLOL TARTRATE 1 MG/ML
5 INJECTION, SOLUTION INTRAVENOUS EVERY 6 HOURS PRN
Status: DISCONTINUED | OUTPATIENT
Start: 2017-10-07 | End: 2017-10-18 | Stop reason: HOSPADM

## 2017-10-07 RX ADMIN — ALBUMIN (HUMAN) 12.5 G: 12.5 SOLUTION INTRAVENOUS at 20:29

## 2017-10-07 RX ADMIN — HEPARIN SODIUM 5000 UNITS: 5000 INJECTION, SOLUTION INTRAVENOUS; SUBCUTANEOUS at 08:21

## 2017-10-07 RX ADMIN — ALBUMIN (HUMAN) 250 ML: 12.5 SOLUTION INTRAVENOUS at 09:05

## 2017-10-07 RX ADMIN — MEROPENEM 500 MG: 500 INJECTION, POWDER, FOR SOLUTION INTRAVENOUS at 14:22

## 2017-10-07 RX ADMIN — CALCITRIOL 0.25 MCG: 0.25 CAPSULE, LIQUID FILLED ORAL at 09:14

## 2017-10-07 RX ADMIN — SODIUM CHLORIDE 250 ML: 9 INJECTION, SOLUTION INTRAVENOUS at 08:00

## 2017-10-07 RX ADMIN — Medication 0.25 MG: at 09:40

## 2017-10-07 RX ADMIN — Medication 2 G: at 11:15

## 2017-10-07 RX ADMIN — POTASSIUM CHLORIDE 10 MEQ: 7.46 INJECTION, SOLUTION INTRAVENOUS at 12:06

## 2017-10-07 RX ADMIN — IPRATROPIUM BROMIDE AND ALBUTEROL SULFATE 3 ML: .5; 3 SOLUTION RESPIRATORY (INHALATION) at 07:28

## 2017-10-07 RX ADMIN — HEPARIN SODIUM 5000 UNITS: 5000 INJECTION, SOLUTION INTRAVENOUS; SUBCUTANEOUS at 23:41

## 2017-10-07 RX ADMIN — HEPARIN SODIUM 5000 UNITS: 5000 INJECTION, SOLUTION INTRAVENOUS; SUBCUTANEOUS at 16:47

## 2017-10-07 RX ADMIN — ACETAMINOPHEN 650 MG: 325 TABLET, FILM COATED ORAL at 09:14

## 2017-10-07 RX ADMIN — BUMETANIDE 2 MG: 0.25 INJECTION INTRAMUSCULAR; INTRAVENOUS at 08:21

## 2017-10-07 RX ADMIN — PENTOXIFYLLINE 400 MG: 400 TABLET, FILM COATED, EXTENDED RELEASE ORAL at 09:14

## 2017-10-07 RX ADMIN — METOPROLOL TARTRATE 25 MG: 25 TABLET, FILM COATED ORAL at 09:14

## 2017-10-07 RX ADMIN — CHOLECALCIFEROL CAP 125 MCG (5000 UNIT) 5000 UNITS: 125 CAP at 09:14

## 2017-10-07 RX ADMIN — Medication 0.25 MG: at 16:47

## 2017-10-07 RX ADMIN — METOPROLOL TARTRATE 2.5 MG: 5 INJECTION INTRAVENOUS at 09:17

## 2017-10-07 RX ADMIN — Medication 0.25 MG: at 23:40

## 2017-10-07 RX ADMIN — AMLODIPINE BESYLATE 10 MG: 10 TABLET ORAL at 09:14

## 2017-10-07 RX ADMIN — BUMETANIDE 2 MG: 0.25 INJECTION INTRAMUSCULAR; INTRAVENOUS at 16:47

## 2017-10-07 RX ADMIN — DILTIAZEM HYDROCHLORIDE 5 MG/HR: 5 INJECTION INTRAVENOUS at 14:33

## 2017-10-07 RX ADMIN — METOPROLOL TARTRATE 5 MG: 5 INJECTION INTRAVENOUS at 20:34

## 2017-10-07 RX ADMIN — METOPROLOL TARTRATE 5 MG: 5 INJECTION INTRAVENOUS at 22:39

## 2017-10-07 RX ADMIN — Medication 37.5 MG: at 20:10

## 2017-10-07 RX ADMIN — METOPROLOL TARTRATE 2.5 MG: 5 INJECTION INTRAVENOUS at 09:24

## 2017-10-07 RX ADMIN — IPRATROPIUM BROMIDE AND ALBUTEROL SULFATE 3 ML: .5; 3 SOLUTION RESPIRATORY (INHALATION) at 12:33

## 2017-10-07 NOTE — PROGRESS NOTES
Renal Medicine Progress Note            Assessment/Plan:     1. CKD V-->like ESRD   -started HD 10/2               -RIJ tunneled placed 10/6      2. Infection/PNA. CXR on 10/5 with worsening bilateral infiltrate, particularly RUL. Aspiration?                -on meropenem      3. Pulmonary edema/respitoary failure. CXR with vascular congestion and large L pleural effusion.                   -TTE with moderate RV dysfunction, + DD and pulmonary hypertenison      4. Anemia. FE storage is on the low side.      5. CKD-MBD. Hypocalcemia and 2HPTH      6. Recurrent Afib with RVR.      7. Hypernatremia. Encourage free water intake.     Plan.   1. Repeat CXR  2. Pt is diuresing well with Bumex. Can add Diuril as needed.  3. Can change metoprolol to q6hrs instead of BID  4. Recheck renal panel at 1600  5. Add-on procalcitonin         Interval History:     Patient went back into Afib with RVR during HD treatment. Dialysis stopped, and metoprolol was given. She also got albumin.   Pt is on intermittent BiPAP and 100% HF. CXR on 10/5 with worsening bilateral infiltrate, particularly RUL.           Medications and Allergies:       albumin human  50 g Intravenous Once     amLODIPine  10 mg Oral Daily     insulin aspart  1-7 Units Subcutaneous Q4H     ipratropium - albuterol 0.5 mg/2.5 mg/3 mL  1 vial Nebulization TID     metoprolol  25 mg Oral BID     pentoxifylline  400 mg Oral Daily     sodium chloride (PF)  10 mL Intracatheter Q8H     meropenem  500 mg Intravenous Q24H     bumetanide  2 mg Intravenous Q8H     cholecalciferol  5,000 Units Oral Daily     calcitRIOL  0.25 mcg Oral Daily     heparin  5,000 Units Subcutaneous Q8H     - MEDICATION INSTRUCTIONS for Dialysis Patients -   Does not apply See Admin Instructions        Allergies   Allergen Reactions     Ciprofloxacin Nausea and Vomiting     Zofran did not help     Oxycodone Visual Disturbance     Delusions, blackouts      Lisinopril Cough     Sulfa Drugs GI Disturbance      LOSS OF TASTE            Physical Exam:   Vitals were reviewed  Heart Rate: 168, Blood pressure 122/77, pulse 114, temperature 97.9  F (36.6  C), temperature source Oral, resp. rate 10, weight 70.5 kg (155 lb 6.8 oz), SpO2 98 %, not currently breastfeeding.    Wt Readings from Last 3 Encounters:   10/07/17 70.5 kg (155 lb 6.8 oz)   10/01/17 77.3 kg (170 lb 8 oz)   09/22/17 77.1 kg (170 lb)       Intake/Output Summary (Last 24 hours) at 10/07/17 0956  Last data filed at 10/07/17 0905   Gross per 24 hour   Intake           482.33 ml   Output             1695 ml   Net         -1212.67 ml     GENERAL APPEARANCE: sleeping  HEENT:  BiPAP in place  RESP: Sound junky. Poor airflow.   CV: irregular/irregular. Tachy  ABDOMEN: soft, NT  EXTREMITIES/SKIN: no rashes/lesions on observed skin; Edema much better  Neuro: arouse to voice.  RIJ tunneled CVC         Data:     CBC RESULTS:     Recent Labs  Lab 10/07/17  0442 10/06/17  1330 10/05/17  0350 10/04/17  0440 10/03/17  1755 10/03/17  0820 10/03/17  0425 10/02/17  1440   WBC 7.2 8.2 11.8* 9.0  --   --  10.8 11.6*   RBC 2.79* 2.83* 2.96* 2.62*  --   --  2.50* 3.01*   HGB 7.6* 7.7* 7.9* 7.1* 7.5* 6.3* 6.6* 7.9*   HCT 24.0* 24.4* 24.7* 21.6*  --   --  20.3* 24.7*   * 159 197 182  --   --  219 300       Basic Metabolic Panel:    Recent Labs  Lab 10/07/17  0855 10/07/17  0442 10/06/17  1330 10/06/17  1010 10/06/17  0930 10/05/17  0350 10/04/17  0440 10/03/17  0425 10/02/17  2100   NA  --  147* 148*  --   --  145* 144 144 146*   POTASSIUM 3.6 3.2* 3.4 3.2* Canceled, Test credited 3.2* 3.2* 3.9 5.1   CHLORIDE  --  109 109  --   --  108 109 112* 119*   CO2  --  31 26  --   --  26 23 21 14*   BUN  --  23 19  --   --  25 26 37* 61*   CR  --  3.51* 2.98*  --   --  2.90* 3.19* 3.78* 5.24*   GLC  --  146* 151*  --   --  168* 157* 141* 163*   MALICK  --  7.6* 7.5*  --   --  7.7* 6.6* 6.6* 6.5*       INR  Recent Labs  Lab 10/06/17  0930 10/03/17  0425 10/02/17  2100 10/02/17  1045   INR  0.95 1.82* 2.37* 6.00*      Attestation:   I have reviewed today's relevant vital signs, notes, medications, labs and imaging.    Davey Palma MD  [facility] Nephrology  Office phone :[phone]  Pager: [phone]

## 2017-10-07 NOTE — PLAN OF CARE
Problem: Patient Care Overview  Goal: Plan of Care/Patient Progress Review  Outcome: Improving  Shift Summary.  For vital signs and complete assessments, please see documentation flowsheets  Neuro: A+ Ox3  CV: sinus tachycardia, K+ replaced  Pulm HF 80% 50Liters  GI/: moreira 125 /hr  Skin: Rt groin C/D/I  Wean O2 as tolerated     .

## 2017-10-07 NOTE — PROGRESS NOTES
Dialysis Run:  Pt Dialyzed at bedside in ICU Time out taken  Pt ran for 1 hour on a K4 with a net fluid removal of 0L  A BF of 400 was maintained via RIJ  Meds :Venofer     No Heparin given during the run Heparin instilled in both ports post run   Complications:Afib Tachycardic  Stopped run per Dr Palma  Aseptic prep both on and off procedure.  Procedure and ESRD discussed and all questions answered    Water detection monitor on floor during run  Pt dialyzes at TBD  Pre Weight  70.5 *  Post Weight 70.5 kg   EDW TBD     Potassium   Date Value Ref Range Status   10/07/2017 3.6 3.4 - 5.3 mmol/L Final   ]  Hemoglobin   Date Value Ref Range Status   10/07/2017 7.6 (L) 11.7 - 15.7 g/dL Final   ]  Creatinine   Date Value Ref Range Status   10/07/2017 3.51 (H) 0.52 - 1.04 mg/dL Final   ]  Vanessa Leonard RN [facility]

## 2017-10-07 NOTE — PROGRESS NOTES
[facility] Intensive Care Unit  Comprehensive Daily ICU Note  October 7, 2017      Kathryn Banks MRN# [id number]   Age: 77 year old YOB: 1940     Date of Admission: 10/2/2017  Primary care provider: Dheeraj Jj             Diagnosis/Problem list:            Patient Active Problem List      Diagnosis     Septic shock (H)     Anemia in stage 5 chronic kidney disease (H)     CKD (chronic kidney disease) stage 5, GFR less than 15 ml/min (H)     Volume overload     Pulmonary edema     Acidosis     Chronic heart failure (H) with preserved EF     Type 2 diabetes mellitus with other circulatory complications     CAD - NSTEMI, CABG x 5 2007, angio in 2013 with patent grafts other than occluded graft to PL       CODE STATUS: Full Code         Assessment and Plan:     Kathryn Banks is a 77 year old female admitted on 10/2/2017 and currently in the ICU. Based on my exam and review of the data,  the plan for the next 24h is as follows:     Neurology:   1. Altered mental status, anxiety -- Multifactorial possible delirium. Toxic metabolic encephalpathy She is intermittently confused and states that she is having occasional hallucinations. She is overall pleasant and non-aggressive towards staff.   - Provide orientation and redirection as appropriate  - PRN clonazepam for anxiety  - Minimize medication that could cause / worsen delerium     2. Pain -- Tylenol as first choice. Cautious use of dilaudid given her AMS and risk for delirium.     Pulmonary:   1. Acute respiratory failure, ANABEL -- Likely related to pulmonary edema from decompensated HF/ CKD/ malnutrion. Pneumonia, and atelectasis from being unable to sit fully upright for several days due to her temporary femoral dialysis catheter. She has a labile respiratory status, was only able to tolerate short periods of high flow NC O2. She has a history of ANABEL requiring CPAP and was started on supplemental daytime oxygen 8/2017. Given her history of  respiratory compromise, it is unlikely that she will be able to tolerate anything less intensive than CPAP/BiPAP while sleeping.   - Sit patient upright, move to chair, pulmonary toilet   - Scheduled duonebs, PRN albuterol neb  - Wean oxygen as tolerated only when awake; leave on BiPAP overnight due to ANABEL    2. Pneumonia -- Afebrile, normal WBC #, procal 0.24, started on empiric IV vancomycin and IV piperacillin/tazobactam. Her sputum with small amount of Candida and coag negative Staph 10/5, however given her hypoxia, productive cough, and worsening RUL infiltrate on CXR despite being on vancomycin and piperacillin/tazobactam, we decided to switch her to IV meropenem on 10/5 to treat her pneumonia.  - CXR today  - Recheck procal, CRP  - Continue meropenem    Cardiovascular system:   1. Hemodynamics: -177 systolic,  diastolic. She was hypotensive on admission which improved with IVF and initiation of dialysis. Echo with  moderate pulmonary HTN    HFpEF  managed outpatient with amlodipine and metoprolol. Difficult dialysis runs and unable to pull fluid. ?preload-dependent nature of her pulmonary hypertension.   - Continue amlodipine, metoprolol  - PRN hydralazine      2. Paroxysmal Atrial Fibrillation: History of ablation 6/2017. She went into A. Fib with RVR on 10/4 and converted back to SR with a diltiazem drip. She went back into A. Fib with RVR on 10/7 during her run of hemodialysis. Mag 1.6, K 3.6 which may be contributing to her rhythm changes with dialysis.  - Replace magnesium to maintain levels >2 mg/dL  - Replace potassium to maintain levels >4 mg/dL  - If A fib does not convert with metoprolol, will restart diltiazem drip  - Will continue to hold PTA warfarin while in ICU     3. HFpEF: Echo 10/2 unchanged from prior with EF 60-65%, pulmonary hypertension (26-55mmHg), diastolic dysfunction, and mild-moderately reduced RV function. She had pulmonary edema on CXR and peripheral edema on admission  suggestive of decompensated HF contributing to her decreased respiratory status. She is diuresing well with Bumex.      Renal/Electrolytes:  1. ESRD -- most likely due to worsening of CKD V by hypovolemia on admission. Initiated hemodialysis 10/2. RIJ tunneled catheter placed 10/6.    - Nephrology consulted, appreciate involvement  - Resume hemodialysis 10/9     2. Oliguria -- improved with Bumex, UOP last 24h 1670ml. Cee in place.  - Strict I/Os     3. Hypokalemia -- Potassium stable at 3.2, required repletion with 20-40mEq KCl daily since initiation of dialysis.  - Will give KCl repletion to maintain K>4     4. Hypomagnesemia -- Mag 1.4 today.  - Gave 2g Mag replacement     ID:  1. Pneumonia -- given worsening of RUL infiltrate on CXR and decline in respiratory status, was switched from IV pip/tazo to IV meropenem 10/5.      2. Hx of C. Difficile -- Diagnosed 9/14/17 at [facility]. Initially treated with PO metronidazole without response and was transition to oral vancomycin on 9/22. She completed a 14 day course of oral vancomycin on 10/6/17. Negative stool C difficile toxin PCR this admission.     GI/Nutrition  1. Diet -- Dialysis diet with Nepro oral supplement.      Endocrine:   1. Diabetes -- BG range 130-170  - Q4H glucose checks  - Insulin to keep BG <150     MSK/Rheumatology:  1. Bilateral lower extremity ulceration -- improving with wound cares     ICU prophylaxis:   SQH Q8H     Restrain:  Restrain for medical healing needed :NO.         Key treatment goals for next 24 hours:   1. Replete electrolytes  2. Monitor cardiac rhythm and convert A. Fib as necessary  3. Monitor oxygenation levels and wean O2 as appropriate         Last 24 hours: key event / Changes   - IR placed tunneled RIJ catheter for hemodialysis  - Became tachycardic and entered A. Fib with RVR while on hemodialysis 10/7, most likely related to decreased intravascular volume. Stopped HD and gave albumin and metoprolol, tachycardia improved.         Medications:     Current Facility-Administered Medications Ordered in Epic   Medication Dose Route Frequency Last Rate Last Dose     clonazePAM (klonoPIN) half-tab 0.25 mg  0.25 mg Oral BID PRN   0.25 mg at 10/07/17 0940     albumin human 5 % injection 50 g  50 g Intravenous Once         potassium chloride 10 mEq in 100 mL intermittent infusion  10 mEq Intravenous Once         hydrALAZINE (APRESOLINE) injection 10 mg  10 mg Intravenous Q6H PRN   10 mg at 10/06/17 0224     dextrose 5% and 0.45% NaCl infusion   Intravenous Continuous 20 mL/hr at 10/06/17 1323       amLODIPine (NORVASC) tablet 10 mg  10 mg Oral Daily   10 mg at 10/07/17 0914     insulin aspart (NovoLOG) inj (RAPID ACTING)  1-7 Units Subcutaneous Q4H   1 Units at 10/07/17 0821     ipratropium - albuterol 0.5 mg/2.5 mg/3 mL (DUONEB) neb solution 3 mL  1 vial Nebulization TID   3 mL at 10/07/17 0728     metoprolol (LOPRESSOR) tablet 25 mg  25 mg Oral BID   25 mg at 10/07/17 0914     pentoxifylline (TRENtal) CR tablet 400 mg  400 mg Oral Daily   400 mg at 10/07/17 0914     lidocaine (LMX4) cream   Topical Once PRN         heparin lock flush 10 UNIT/ML injection 2-5 mL  2-5 mL Intracatheter Once PRN         sodium chloride (PF) 0.9% PF flush 10-20 mL  10-20 mL Intracatheter Q1H PRN   10 mL at 10/06/17 1329     sodium chloride (PF) 0.9% PF flush 10 mL  10 mL Intracatheter Q8H   10 mL at 10/07/17 1116     meropenem (MERREM) 500 mg vial to attach to  mL bag for ADULTS or 25 mL bag for PEDS  500 mg Intravenous Q24H   500 mg at 10/06/17 1539     bumetanide (BUMEX) injection 2 mg  2 mg Intravenous Q8H   2 mg at 10/07/17 0821     cholecalciferol (vitamin D3) capsule CAPS 5,000 Units  5,000 Units Oral Daily   5,000 Units at 10/07/17 0914     calcitRIOL (ROCALTROL) capsule 0.25 mcg  0.25 mcg Oral Daily   0.25 mcg at 10/07/17 0914     HYDROmorphone (PF) (DILAUDID) injection 0.2 mg  0.2 mg Intravenous Q3H PRN   0.2 mg at 10/06/17 1020     acetaminophen  (TYLENOL) tablet 325-650 mg  325-650 mg Oral Q6H PRN   650 mg at 10/07/17 0914     heparin sodium PF injection 5,000 Units  5,000 Units Subcutaneous Q8H   5,000 Units at 10/07/17 0821     naloxone (NARCAN) injection 0.1-0.4 mg  0.1-0.4 mg Intravenous Q2 Min PRN   0.4 mg at 10/06/17 1109     albuterol neb solution 2.5 mg  2.5 mg Nebulization Q4H PRN   2.5 mg at 10/04/17 1315     dextrose 10 % 1,000 mL infusion   Intravenous Continuous PRN         glucose 40 % gel 15-30 g  15-30 g Oral Q15 Min PRN        Or     dextrose 50 % injection 25-50 mL  25-50 mL Intravenous Q15 Min PRN   25 mL at 10/02/17 1209    Or     glucagon injection 1 mg  1 mg Subcutaneous Q15 Min PRN         lidocaine (XYLOCAINE) 2 % topical gel   Topical Q4H PRN         - MEDICATION INSTRUCTIONS for Dialysis Patients -   Does not apply See Admin Instructions         No current [facility]-ordered outpatient prescriptions on file.          Lines/ tubes   The  lines and appliances are currently used in this patient   PICC or CVP: YES  Cee cath: YES  Arterial line: NO  ETT: NO  N/O gastric tube::  NO  Feeding tube: NO  Other device(s): RIJ tunneled dialysis catheter    All the above appliances including central lines were reviewed today and assessed for the need to be continued or placed de jimbo to care for this critically ill patient.         Physical Exam:   Vital Sign Ranges  Temperature Temp  Av.9  F (36.6  C)  Min: 97.9  F (36.6  C)  Max: 98  F (36.7  C)   Blood pressure Systolic (24hrs), Av , Min:102 , Max:177        Diastolic (24hrs), Av, Min:66, Max:103      Pulse Pulse  Av  Min: 114  Max: 114   Respirations Resp  Av  Min: 10  Max: 32   Pulse oximetry SpO2  Av.3 %  Min: 60 %  Max: 100 %       General:  Sleepy but easily aroused female, chronically ill appearing, lying in bed in no acute distress with BiPAP in place  Neuro: Alert, moderately confused patient.   HEENT: Not orally intubated, no N/O gastric tube present.  Mucous membranes moist.   CV: Atrial fibrillation with occasional PVCs. Neck veins non-distended. No gallop or murmur. Radial pulses weak and irregularly irregular.   Pulm: Bilateral chest symmetric on inspection. Decreased coarse expiratory rhonchi throughout bilateral lungs compared to yesterday. Soft breath sounds in LLL.  Abdomen: Abdomen soft, non-tender.  BS normal.  No masses, organomegaly. Cee present.   Extremities: No edema. Bilateral lower extremity ulcerations improving with decreased surrounding erythema. Dark discoloration of non-ulcerated skin of BLE.   Lines: No erythema or discharge present at line sites.         Ancillary Data:   All laboratory and imaging data reviewed.         Hemodynamics/I & O:   BP - Mean:  [] 95    Intake/Output Summary (Last 24 hours) at 10/07/17 0853  Last data filed at 10/07/17 0600   Gross per 24 hour   Intake           482.33 ml   Output             1545 ml   Net         -1062.67 ml         Mechanical ventilation   FiO2 (%): 70 %  Resp: 21    Recent Labs  Lab 10/04/17  1040 10/01/17  2145   O2PER Canceled:  Specimen improperly labeled. 21             Labs and EKG   All lab results reviewed past 24 hours  All imaging results reviewed past 24 hours  Chemistry:     Recent Labs  Lab 10/07/17  0855 10/07/17  0442 10/06/17  1330 10/06/17  1010  10/05/17  0350 10/04/17  0440   NA  --  147* 148*  --   --  145* 144   POTASSIUM 3.6 3.2* 3.4 3.2*  < > 3.2* 3.2*   CHLORIDE  --  109 109  --   --  108 109   CO2  --  31 26  --   --  26 23   BUN  --  23 19  --   --  25 26   CR  --  3.51* 2.98*  --   --  2.90* 3.19*   GLC  --  146* 151*  --   --  168* 157*   MALICK  --  7.6* 7.5*  --   --  7.7* 6.6*   < > = values in this interval not displayed.    Recent Labs  Lab 10/04/17  0440 10/01/17  2145   AST  --  16   ALT  --  16   BILITOTAL  --  0.3   ALBUMIN  --  2.0*   PROTTOTAL 4.8* 5.5*   ALKPHOS  --  197*     Hematology:    Recent Labs  Lab 10/07/17  0442 10/06/17  1330 10/05/17  5273  10/04/17  0440  10/03/17  0425 10/02/17  1440   WBC 7.2 8.2 11.8* 9.0  --  10.8 11.6*   RBC 2.79* 2.83* 2.96* 2.62*  --  2.50* 3.01*   HGB 7.6* 7.7* 7.9* 7.1*  < > 6.6* 7.9*   HCT 24.0* 24.4* 24.7* 21.6*  --  20.3* 24.7*   * 159 197 182  --  219 300   < > = values in this interval not displayed.    Recent Labs  Lab 10/06/17  0930 10/03/17  0425 10/02/17  2100 10/02/17  1045   INR 0.95 1.82* 2.37* 6.00*        Cultures:    Recent Labs  Lab 10/02/17  1430 10/01/17  2255 10/01/17  2157 10/01/17  2145   CULT Light growthCandida albicans / dubliniensisCandida albicans and Brook dubliniensis are not routinely speciatedSusceptibility testing not routinely done*  Light growthCoagulase negative StaphylococcusSusceptibility testing not routinely done* No growth No growth after 6 days No growth after 6 days      Blood culture 10/1: no growth x2 samples  Urine culture 10/1: no growth       Imaging Studies:   CXR 10/5:  Since previous one, increased density of RUL infiltrates. LLL pleural effusion stable in size. No evidence of pneumothorax by my read.    Scribed by June Ansari, MS4, for Dr. Naman Crisostomo MD.    I have seen and examined the patient multiple visits during the day. Tolerated dialysis poorly. Will continue to monitor closely. Start diltiazem gtt if HR still high    Naman Crisostomo

## 2017-10-07 NOTE — PROGRESS NOTES
Patient has been on/off BIPAP and HFNC throughout the day. Currently on HFNC 40LPM 85%, tolerating well. Neb tx given as tolerated. BiPAP at night. Will continue to follow.    Angélica BORGES

## 2017-10-07 NOTE — PROGRESS NOTES
Pt has been on bipap and HFNC t/o the day.  Will continue to wean FIO2 as able.    Urbano Dunn RT

## 2017-10-07 NOTE — PLAN OF CARE
Problem: Patient Care Overview  Goal: Individualization & Mutuality  Outcome: Improving  Pt continues to be confused but tolerated BiPap overnight. On high flow now this am sats in the low 90's. Continues to have loose brown green stools. Adequate urine output. Plan for dialysis today. K 3.2 with PVCs. Drsgs intact to bilat leg ulcers.

## 2017-10-07 NOTE — PLAN OF CARE
Problem: Patient Care Overview  Goal: Plan of Care/Patient Progress Review  Outcome: Therapy, progress toward functional goals is gradual  Patient continues to progress and decline.  Did not tolerate dialysis today and returned in to Atrial Fibrillation with RVR. Cardizem gtt started per same. Given potassium and magnesium. Attempted additional IV metoprolol prior to diltiazem gtt. Without success. BIPAP much of the day.  Return to high flow tolerating well.  Lung diminished . Assessment as noted. Confused to time. Denies pain. Complaints of anxiety off and on today.

## 2017-10-08 LAB
ALBUMIN SERPL-MCNC: 2.2 G/DL (ref 3.4–5)
ALP SERPL-CCNC: 152 U/L (ref 40–150)
ALT SERPL W P-5'-P-CCNC: 10 U/L (ref 0–50)
AST SERPL W P-5'-P-CCNC: 12 U/L (ref 0–45)
BASE EXCESS BLDA CALC-SCNC: 2.1 MMOL/L
BASOPHILS # BLD AUTO: 0 10E9/L (ref 0–0.2)
BASOPHILS NFR BLD AUTO: 0.4 %
BILIRUB SERPL-MCNC: 0.4 MG/DL (ref 0.2–1.3)
BUN SERPL-MCNC: 24 MG/DL (ref 7–30)
CALCIUM SERPL-MCNC: 7.7 MG/DL (ref 8.5–10.1)
CHLORIDE SERPL-SCNC: 108 MMOL/L (ref 94–109)
CO2 SERPL-SCNC: 29 MMOL/L (ref 20–32)
CREAT SERPL-MCNC: 3.28 MG/DL (ref 0.52–1.04)
DIFFERENTIAL METHOD BLD: ABNORMAL
EOSINOPHIL # BLD AUTO: 0.3 10E9/L (ref 0–0.7)
EOSINOPHIL NFR BLD AUTO: 3.8 %
ERYTHROCYTE [DISTWIDTH] IN BLOOD BY AUTOMATED COUNT: 18 % (ref 10–15)
GFR SERPL CREATININE-BSD FRML MDRD: 14 ML/MIN/1.7M2
GLUCOSE BLDC GLUCOMTR-MCNC: 134 MG/DL (ref 70–99)
GLUCOSE BLDC GLUCOMTR-MCNC: 136 MG/DL (ref 70–99)
GLUCOSE BLDC GLUCOMTR-MCNC: 233 MG/DL (ref 70–99)
GLUCOSE BLDC GLUCOMTR-MCNC: 240 MG/DL (ref 70–99)
GLUCOSE BLDC GLUCOMTR-MCNC: 290 MG/DL (ref 70–99)
GLUCOSE SERPL-MCNC: 137 MG/DL (ref 70–99)
HCO3 BLD-SCNC: 27 MMOL/L (ref 21–28)
HCT VFR BLD AUTO: 23 % (ref 35–47)
HGB BLD-MCNC: 7.2 G/DL (ref 11.7–15.7)
IMM GRANULOCYTES # BLD: 0.3 10E9/L (ref 0–0.4)
IMM GRANULOCYTES NFR BLD: 4.1 %
LYMPHOCYTES # BLD AUTO: 0.6 10E9/L (ref 0.8–5.3)
LYMPHOCYTES NFR BLD AUTO: 7.7 %
MAGNESIUM SERPL-MCNC: 2 MG/DL (ref 1.6–2.3)
MCH RBC QN AUTO: 27.5 PG (ref 26.5–33)
MCHC RBC AUTO-ENTMCNC: 31.3 G/DL (ref 31.5–36.5)
MCV RBC AUTO: 88 FL (ref 78–100)
MONOCYTES # BLD AUTO: 0.4 10E9/L (ref 0–1.3)
MONOCYTES NFR BLD AUTO: 4.3 %
NEUTROPHILS # BLD AUTO: 6.6 10E9/L (ref 1.6–8.3)
NEUTROPHILS NFR BLD AUTO: 79.7 %
NRBC # BLD AUTO: 0 10*3/UL
NRBC BLD AUTO-RTO: 0 /100
O2/TOTAL GAS SETTING VFR VENT: 60 %
OXYHGB MFR BLD: 96 % (ref 92–100)
PCO2 BLD: 46 MM HG (ref 35–45)
PH BLD: 7.39 PH (ref 7.35–7.45)
PHOSPHATE SERPL-MCNC: 3.1 MG/DL (ref 2.5–4.5)
PLATELET # BLD AUTO: 134 10E9/L (ref 150–450)
PO2 BLD: 97 MM HG (ref 80–105)
POTASSIUM SERPL-SCNC: 3.4 MMOL/L (ref 3.4–5.3)
PROT SERPL-MCNC: 5.1 G/DL (ref 6.8–8.8)
RBC # BLD AUTO: 2.62 10E12/L (ref 3.8–5.2)
SODIUM SERPL-SCNC: 145 MMOL/L (ref 133–144)
WBC # BLD AUTO: 8.3 10E9/L (ref 4–11)

## 2017-10-08 PROCEDURE — A9270 NON-COVERED ITEM OR SERVICE: HCPCS | Mod: GY | Performed by: SURGERY

## 2017-10-08 PROCEDURE — 94640 AIRWAY INHALATION TREATMENT: CPT | Mod: 76

## 2017-10-08 PROCEDURE — 84100 ASSAY OF PHOSPHORUS: CPT | Performed by: INTERNAL MEDICINE

## 2017-10-08 PROCEDURE — 99291 CRITICAL CARE FIRST HOUR: CPT | Performed by: INTERNAL MEDICINE

## 2017-10-08 PROCEDURE — 25000132 ZZH RX MED GY IP 250 OP 250 PS 637: Mod: GY | Performed by: SURGERY

## 2017-10-08 PROCEDURE — 82805 BLOOD GASES W/O2 SATURATION: CPT | Performed by: INTERNAL MEDICINE

## 2017-10-08 PROCEDURE — 83735 ASSAY OF MAGNESIUM: CPT | Performed by: INTERNAL MEDICINE

## 2017-10-08 PROCEDURE — 85025 COMPLETE CBC W/AUTO DIFF WBC: CPT | Performed by: INTERNAL MEDICINE

## 2017-10-08 PROCEDURE — S0169 CALCITROL: HCPCS | Mod: GY | Performed by: INTERNAL MEDICINE

## 2017-10-08 PROCEDURE — 00000146 ZZHCL STATISTIC GLUCOSE BY METER IP

## 2017-10-08 PROCEDURE — A9270 NON-COVERED ITEM OR SERVICE: HCPCS | Mod: GY | Performed by: INTERNAL MEDICINE

## 2017-10-08 PROCEDURE — 40000275 ZZH STATISTIC RCP TIME EA 10 MIN

## 2017-10-08 PROCEDURE — 25000128 H RX IP 250 OP 636: Performed by: INTERNAL MEDICINE

## 2017-10-08 PROCEDURE — 25000125 ZZHC RX 250: Performed by: SURGERY

## 2017-10-08 PROCEDURE — 94660 CPAP INITIATION&MGMT: CPT

## 2017-10-08 PROCEDURE — 25000125 ZZHC RX 250: Performed by: INTERNAL MEDICINE

## 2017-10-08 PROCEDURE — 20000003 ZZH R&B ICU

## 2017-10-08 PROCEDURE — 25000132 ZZH RX MED GY IP 250 OP 250 PS 637: Mod: GY | Performed by: INTERNAL MEDICINE

## 2017-10-08 PROCEDURE — 80053 COMPREHEN METABOLIC PANEL: CPT | Performed by: INTERNAL MEDICINE

## 2017-10-08 PROCEDURE — 25000128 H RX IP 250 OP 636: Performed by: SURGERY

## 2017-10-08 PROCEDURE — S0171 BUMETANIDE 0.5 MG: HCPCS | Performed by: INTERNAL MEDICINE

## 2017-10-08 PROCEDURE — 40000239 ZZH STATISTIC VAT ROUNDS

## 2017-10-08 PROCEDURE — 36600 WITHDRAWAL OF ARTERIAL BLOOD: CPT

## 2017-10-08 PROCEDURE — 94640 AIRWAY INHALATION TREATMENT: CPT

## 2017-10-08 RX ORDER — METOPROLOL TARTRATE 50 MG
50 TABLET ORAL 2 TIMES DAILY
Status: DISCONTINUED | OUTPATIENT
Start: 2017-10-08 | End: 2017-10-11

## 2017-10-08 RX ORDER — ALBUMIN, HUMAN INJ 5% 5 %
250 SOLUTION INTRAVENOUS
Status: CANCELLED | OUTPATIENT
Start: 2017-10-08

## 2017-10-08 RX ORDER — METOPROLOL TARTRATE 1 MG/ML
5 INJECTION, SOLUTION INTRAVENOUS ONCE
Status: COMPLETED | OUTPATIENT
Start: 2017-10-08 | End: 2017-10-08

## 2017-10-08 RX ORDER — ALBUMIN, HUMAN INJ 5% 5 %
250 SOLUTION INTRAVENOUS
Status: CANCELLED | OUTPATIENT
Start: 2017-10-08 | End: 2017-10-08

## 2017-10-08 RX ADMIN — Medication 0.25 MG: at 21:04

## 2017-10-08 RX ADMIN — DILTIAZEM HYDROCHLORIDE 15 MG/HR: 5 INJECTION INTRAVENOUS at 10:24

## 2017-10-08 RX ADMIN — CHLOROTHIAZIDE SODIUM 500 MG: 500 INJECTION, POWDER, LYOPHILIZED, FOR SOLUTION INTRAVENOUS at 13:07

## 2017-10-08 RX ADMIN — HEPARIN SODIUM 5000 UNITS: 5000 INJECTION, SOLUTION INTRAVENOUS; SUBCUTANEOUS at 09:02

## 2017-10-08 RX ADMIN — BUMETANIDE 2 MG: 0.25 INJECTION INTRAMUSCULAR; INTRAVENOUS at 23:46

## 2017-10-08 RX ADMIN — Medication 0.25 MG: at 09:02

## 2017-10-08 RX ADMIN — CALCITRIOL 0.25 MCG: 0.25 CAPSULE, LIQUID FILLED ORAL at 09:02

## 2017-10-08 RX ADMIN — BUMETANIDE 2 MG: 0.25 INJECTION INTRAMUSCULAR; INTRAVENOUS at 09:02

## 2017-10-08 RX ADMIN — HEPARIN SODIUM 5000 UNITS: 5000 INJECTION, SOLUTION INTRAVENOUS; SUBCUTANEOUS at 23:47

## 2017-10-08 RX ADMIN — Medication 37.5 MG: at 09:02

## 2017-10-08 RX ADMIN — CHOLECALCIFEROL CAP 125 MCG (5000 UNIT) 5000 UNITS: 125 CAP at 09:02

## 2017-10-08 RX ADMIN — IPRATROPIUM BROMIDE AND ALBUTEROL SULFATE 3 ML: .5; 3 SOLUTION RESPIRATORY (INHALATION) at 07:15

## 2017-10-08 RX ADMIN — AMLODIPINE BESYLATE 10 MG: 10 TABLET ORAL at 09:02

## 2017-10-08 RX ADMIN — IPRATROPIUM BROMIDE AND ALBUTEROL SULFATE 3 ML: .5; 3 SOLUTION RESPIRATORY (INHALATION) at 20:38

## 2017-10-08 RX ADMIN — PENTOXIFYLLINE 400 MG: 400 TABLET, FILM COATED, EXTENDED RELEASE ORAL at 09:12

## 2017-10-08 RX ADMIN — IPRATROPIUM BROMIDE AND ALBUTEROL SULFATE 3 ML: .5; 3 SOLUTION RESPIRATORY (INHALATION) at 12:50

## 2017-10-08 RX ADMIN — DILTIAZEM HYDROCHLORIDE 15 MG/HR: 5 INJECTION INTRAVENOUS at 17:41

## 2017-10-08 RX ADMIN — MEROPENEM 500 MG: 500 INJECTION, POWDER, FOR SOLUTION INTRAVENOUS at 14:00

## 2017-10-08 RX ADMIN — METOPROLOL TARTRATE 5 MG: 5 INJECTION INTRAVENOUS at 02:19

## 2017-10-08 RX ADMIN — HEPARIN SODIUM 5000 UNITS: 5000 INJECTION, SOLUTION INTRAVENOUS; SUBCUTANEOUS at 16:34

## 2017-10-08 RX ADMIN — METOPROLOL TARTRATE 50 MG: 50 TABLET ORAL at 21:04

## 2017-10-08 RX ADMIN — BUMETANIDE 2 MG: 0.25 INJECTION INTRAMUSCULAR; INTRAVENOUS at 16:34

## 2017-10-08 RX ADMIN — DILTIAZEM HYDROCHLORIDE 15 MG/HR: 5 INJECTION INTRAVENOUS at 01:28

## 2017-10-08 NOTE — PROGRESS NOTES
[facility] Intensive Care Unit  Comprehensive Daily ICU Note  October 8, 2017      Kathryn Banks MRN# [id number]   Age: 77 year old YOB: 1940     Date of Admission: 10/2/2017  Primary care provider: Dheeraj Jj             Diagnosis/Problem list:            Patient Active Problem List      Diagnosis     Septic shock (H)     Anemia in stage 5 chronic kidney disease (H)     CKD (chronic kidney disease) stage 5, GFR less than 15 ml/min (H)     Volume overload     Pulmonary edema     Acidosis     Chronic heart failure (H) with preserved EF     Type 2 diabetes mellitus with other circulatory complications     CAD - NSTEMI, CABG x 5 2007, angio in 2013 with patent grafts other than occluded graft to PL       CODE STATUS: Full Code         Assessment and Plan:     Kathryn Bakns is a 77 year old female admitted on 10/2/2017 and currently in the ICU. Based on my exam and review of the data,  the plan for the next 24h is as follows:     Neurology:   1. Altered mental status, anxiety -- Multifactorial possible delirium. Toxic metabolic encephalpathy   - Provide orientation and redirection as appropriate  - PRN clonazepam for anxiety  - Minimize medication that could cause / worsen delerium     2. Pain -- Tylenol as first choice. Cautious use of dilaudid given her AMS and risk for delirium.     Pulmonary:   1. Acute respiratory failure, ANABEL -- Likely related to pulmonary edema from decompensated HF/ CKD/ malnutrion. Pneumonia, and atelectasis from being unable to sit fully upright for several days due to her temporary femoral dialysis catheter. She has a labile respiratory status, was only able to tolerate short periods of high flow NC O2. She has a history of ANABEL requiring CPAP and was started on supplemental daytime oxygen 8/2017. Given her history of respiratory compromise, it is unlikely that she will be able to tolerate anything less intensive than CPAP/BiPAP while sleeping.   - Sit patient  upright, move to chair, pulmonary toilet   - Scheduled duonebs, PRN albuterol neb  - Wean oxygen as tolerated only when awake; leave on BiPAP overnight due to ANABEL    2. Pneumonia -- Afebrile, normal WBC #, procal 0.24, started on empiric IV vancomycin and IV piperacillin/tazobactam and then switched to IV metopenem. Procal 0.75  - Continue meropenem    3 Will consider ultrasound guided thoracentecis    Cardiovascular system:   1. Hemodynamics: -177 systolic,  diastolic. She was hypotensive on admission which improved with IVF and initiation of dialysis. Echo with  moderate pulmonary HTN    HFpEF  managed outpatient with amlodipine and metoprolol. Difficult dialysis runs and unable to pull fluid. ?preload-dependent nature of her pulmonary hypertension.   - Continue amlodipine, metoprolol  - PRN hydralazine      2. Paroxysmal Atrial Fibrillation: History of ablation 6/2017. She went into A. Fib with RVR on 10/4 and converted back to SR with a diltiazem drip. She went back into A. Fib with RVR on 10/7 during her run of hemodialysis. Mag 1.6, K 3.6 which may be contributing to her rhythm changes with dialysis.  - Replace magnesium to maintain levels >2 mg/dL  - Replace potassium to maintain levels >4 mg/dL  - If A fib with rate controlled with metoprolol and diltiazam  - Will continue to hold PTA warfarin while in ICU, but high risk for cardio embolic events      3. HFpEF: Echo 10/2 unchanged from prior with EF 60-65%, pulmonary hypertension (26-55mmHg), diastolic dysfunction, and mild-moderately reduced RV function. She had pulmonary edema on CXR and peripheral edema on admission suggestive of decompensated HF contributing to her decreased respiratory status. She is diuresing well with Bumex.      Renal/Electrolytes:  1. ESRD -- most likely due to worsening of CKD V by hypovolemia on admission. Initiated hemodialysis 10/2. RIJ tunneled catheter placed 10/6.    - Nephrology consulted, appreciate  involvement  - Resume hemodialysis 10/9     2. Oliguria -- improved with Bumex, UOP last 24h 1670ml. Cee in place.  - Strict I/Os     3. Hypokalemia -- Potassium stable at 3.2, required repletion with 20-40mEq KCl daily since initiation of dialysis.  - Will give KCl repletion to maintain K>4     4. Hypomagnesemia --  - Replace as needed     ID:  1. Pneumonia -- given worsening of RUL infiltrate on CXR and decline in respiratory status, was switched from IV pip/tazo to IV meropenem 10/5.      2. Hx of C. Difficile -- Diagnosed 9/14/17 at [facility]. Initially treated with PO metronidazole without response and was transition to oral vancomycin on 9/22. She completed a 14 day course of oral vancomycin on 10/6/17. Negative stool C difficile toxin PCR this admission.     GI/Nutrition  1. Diet -- Dialysis diet with Nepro oral supplement.      Endocrine:   1. Diabetes -- BG range 130-170  - Q4H glucose checks  - Insulin to keep BG <150     MSK/Rheumatology:  1. Bilateral lower extremity ulceration -- improving with wound cares     ICU prophylaxis:   SQH Q8H     Restrain:  Restrain for medical healing needed :NO.    cct 45 minutes         Key treatment goals for next 24 hours:   1. Cards consult  2 ABG,   3 NIPPV as needed         Last 24 hours: key event / Changes   - Metoprolol, increased, Still on diltiazem. HR sometimes upto 140.  - Still needs high FiO2. Bipap vs HiFlo. Does better with BiPAP.       Medications:     Current Facility-Administered Medications Ordered in Epic   Medication Dose Route Frequency Last Rate Last Dose     metoprolol (LOPRESSOR) tablet 50 mg  50 mg Oral BID         clonazePAM (klonoPIN) half-tab 0.25 mg  0.25 mg Oral BID PRN   0.25 mg at 10/08/17 0902     albumin human 5 % injection 50 g  50 g Intravenous Once         diltiazem (CARDIZEM) 125 mg in NaCl 0.9 % 125 mL infusion  5-15 mg/hr Intravenous Continuous 15 mL/hr at 10/08/17 1024 15 mg/hr at 10/08/17 1024     metoprolol (LOPRESSOR) injection 5  mg  5 mg Intravenous Q6H PRN   5 mg at 10/07/17 2239     hydrALAZINE (APRESOLINE) injection 10 mg  10 mg Intravenous Q6H PRN   10 mg at 10/06/17 0224     dextrose 5% and 0.45% NaCl infusion   Intravenous Continuous 20 mL/hr at 10/06/17 1323       insulin aspart (NovoLOG) inj (RAPID ACTING)  1-7 Units Subcutaneous Q4H   2 Units at 10/08/17 1231     ipratropium - albuterol 0.5 mg/2.5 mg/3 mL (DUONEB) neb solution 3 mL  1 vial Nebulization TID   3 mL at 10/08/17 1250     pentoxifylline (TRENtal) CR tablet 400 mg  400 mg Oral Daily   400 mg at 10/08/17 0912     lidocaine (LMX4) cream   Topical Once PRN         heparin lock flush 10 UNIT/ML injection 2-5 mL  2-5 mL Intracatheter Once PRN         sodium chloride (PF) 0.9% PF flush 10-20 mL  10-20 mL Intracatheter Q1H PRN   10 mL at 10/06/17 1329     sodium chloride (PF) 0.9% PF flush 10 mL  10 mL Intracatheter Q8H   10 mL at 10/08/17 1231     meropenem (MERREM) 500 mg vial to attach to  mL bag for ADULTS or 25 mL bag for PEDS  500 mg Intravenous Q24H   500 mg at 10/08/17 1400     bumetanide (BUMEX) injection 2 mg  2 mg Intravenous Q8H   2 mg at 10/08/17 0902     cholecalciferol (vitamin D3) capsule CAPS 5,000 Units  5,000 Units Oral Daily   5,000 Units at 10/08/17 0902     calcitRIOL (ROCALTROL) capsule 0.25 mcg  0.25 mcg Oral Daily   0.25 mcg at 10/08/17 0902     HYDROmorphone (PF) (DILAUDID) injection 0.2 mg  0.2 mg Intravenous Q3H PRN   0.2 mg at 10/06/17 1020     acetaminophen (TYLENOL) tablet 325-650 mg  325-650 mg Oral Q6H PRN   650 mg at 10/07/17 0914     heparin sodium PF injection 5,000 Units  5,000 Units Subcutaneous Q8H   5,000 Units at 10/08/17 0902     naloxone (NARCAN) injection 0.1-0.4 mg  0.1-0.4 mg Intravenous Q2 Min PRN   0.4 mg at 10/06/17 1109     albuterol neb solution 2.5 mg  2.5 mg Nebulization Q4H PRN   2.5 mg at 10/04/17 1315     dextrose 10 % 1,000 mL infusion   Intravenous Continuous PRN         glucose 40 % gel 15-30 g  15-30 g Oral Q15  "Min PRN        Or     dextrose 50 % injection 25-50 mL  25-50 mL Intravenous Q15 Min PRN   25 mL at 10/02/17 1209    Or     glucagon injection 1 mg  1 mg Subcutaneous Q15 Min PRN         lidocaine (XYLOCAINE) 2 % topical gel   Topical Q4H PRN         - MEDICATION INSTRUCTIONS for Dialysis Patients -   Does not apply See Admin Instructions         No current [facility]-ordered outpatient prescriptions on file.          Lines/ tubes   The  lines and appliances are currently used in this patient   PICC or CVP: YES  Cee cath: YES  Arterial line: NO  ETT: NO  N/O gastric tube::  NO  Feeding tube: NO  Other device(s): RIJ tunneled dialysis catheter    All the above appliances including central lines were reviewed today and assessed for the need to be continued or placed de jimbo to care for this critically ill patient.         Physical Exam:   Vital Sign Ranges  Vital signs:  Temp: 98.1  F (36.7  C) Temp src: Oral BP: 117/75   Heart Rate: 86 Resp: 19 SpO2: 100 % O2 Device: High Flow Nasal Cannula (HFNC) Oxygen Delivery:  (40lpm)   Weight: 68.8 kg (151 lb 10.8 oz)  Estimated body mass index is 26.04 kg/(m^2) as calculated from the following:    Height as of 10/1/17: 1.626 m (5' 4\").    Weight as of this encounter: 68.8 kg (151 lb 10.8 oz).    General:  Sleepy but easily aroused female, chronically ill appearing, lying in bed in no acute distress with BiPAP in place  Neuro: Alert,   HEENT: Intermittent HiFLo, BiPAP,  Mucous membranes moist.   CV: Atrial fibrillation with occasional PVCs. Neck veins non-distended. No gallop or murmur. Radial pulses weak and irregularly irregular.   Pulm: Decreased breath sounds.  Abdomen: Abdomen soft, non-tender.  BS normal.  No masses, organomegaly. Cee present.   Extremities: No edema. Bilateral lower extremity ulcerations improving with decreased surrounding erythema. Dark discoloration of non-ulcerated skin of BLE.   Lines: No erythema or discharge present at line sites.         Ancillary " Data:     All laboratory and imaging data reviewed.         Hemodynamics/I & O:   BP - Mean:  [] 87      Intake/Output Summary (Last 24 hours) at 10/08/17 1627  Last data filed at 10/08/17 1000   Gross per 24 hour   Intake           834.75 ml   Output              820 ml   Net            14.75 ml         Mechanical ventilation   FiO2 (%): 80 %  Resp: 19    Recent Labs  Lab 10/08/17  1238 10/04/17  1040 10/01/17  2145   PH 7.39  --   --    PCO2 46*  --   --    PO2 97  --   --    HCO3 27  --   --    O2PER 60 Canceled:  Specimen improperly labeled. 21             Labs and EKG   All lab results reviewed past 24 hours  All imaging results reviewed past 24 hours  Chemistry:     Recent Labs  Lab 10/08/17  0411 10/07/17  1825 10/07/17  0855 10/07/17  0442 10/06/17  1330   * 144  --  147* 148*   POTASSIUM 3.4 3.6 3.6 3.2* 3.4   CHLORIDE 108 107  --  109 109   CO2 29 27  --  31 26   BUN 24 21  --  23 19   CR 3.28* 2.93*  --  3.51* 2.98*   * 211*  --  146* 151*   MALICK 7.7* 7.7*  --  7.6* 7.5*       Recent Labs  Lab 10/08/17  0411 10/07/17  1825 10/04/17  0440 10/01/17  2145   AST 12  --   --  16   ALT 10  --   --  16   BILITOTAL 0.4  --   --  0.3   ALBUMIN 2.2* 2.1*  --  2.0*   PROTTOTAL 5.1*  --  4.8* 5.5*   ALKPHOS 152*  --   --  197*     Hematology:    Recent Labs  Lab 10/08/17  1230 10/07/17  0442 10/06/17  1330 10/05/17  0350 10/04/17  0440  10/03/17  0425   WBC 8.3 7.2 8.2 11.8* 9.0  --  10.8   RBC 2.62* 2.79* 2.83* 2.96* 2.62*  --  2.50*   HGB 7.2* 7.6* 7.7* 7.9* 7.1*  < > 6.6*   HCT 23.0* 24.0* 24.4* 24.7* 21.6*  --  20.3*   * 144* 159 197 182  --  219   < > = values in this interval not displayed.    Recent Labs  Lab 10/06/17  0930 10/03/17  0425 10/02/17  2100 10/02/17  1045   INR 0.95 1.82* 2.37* 6.00*        Cultures:    Recent Labs  Lab 10/02/17  1430 10/01/17  2255 10/01/17  2157 10/01/17  2145   CULT Light growthCandida albicans / dubliniensisCandida albicans and Brook dubliniensis are  not routinely speciatedSusceptibility testing not routinely done*  Light growthCoagulase negative StaphylococcusSusceptibility testing not routinely done* No growth No growth after 6 days No growth after 6 days      Blood culture 10/1: no growth x2 samples  Urine culture 10/1: no growth       Imaging Studies:   CXR 10/5:  Tip of central line in distal SVC. Sternotomy. Diffuse bilateral pulmonary infiltrates. These have improved in the right  upper lobe compared to 10/5/2017, yet there is increased infiltrate in the left lung base compared to the previous exam.

## 2017-10-08 NOTE — CONSULTS
[facility]    Cardiology Consultation     Date of Admission:  10/2/2017    Assessment & Plan   Kathryn Banks is a 77 year old female who was admitted on 10/2/2017.    1. Paroxysmal atrial fibrillation with rapid ventricular rate  Atrial fibrillation rate now improved to the 80s to 90s on intravenous diltiazem and oral metoprolol. She is on subcutaneous heparin 3 times daily. Atrial fibrillation likely from stress of sepsis and recent pneumonia and fluid overload due to end-stage renal disease. Since rate better controlled, I would recommend continuing intravenous diltiazem. We'll increase metoprolol to 50 minutes twice daily. If when tolerated worsens, we may consider intravenous amiodarone. For now we should hold off on it. Her echo cutting them recently showed normal ejection fraction.  Given high chads vascular score of age, female sex, hypertension and diabetes, score of 5, she should be considered for long-term anticoagulation at discharge.    2. sepsis and pneumonia- respiratory  failure    improving. On antibiotics. Continue dialysis per protocol  Still on high flow oxygen.    3. End-stage renal disease with fluid overload  Continue dialysis with negative fluid balance. She does have pleural effusions, left greater than right and will need to be reevaluated after appropriate dialysis. If significant effusion remains with high oxygen requirements, consider pleural tap.  Patient is to intravenous Bumex per nephrology.    4. History of atrial flutter ablation  Status post cardioversion in June.    5. History of coronary artery disease with previous bypass  Consider ischemic evaluation as an outpatient.  Last in June 2007 showed the graft to the posterior lateral branch was occluded and the rest of the 5 grafts were patent.    Critical care time of 35 minutes spent in evaluation and management of this patient, with respiratory failure, end-stage renal disease and fluid overload, atrial  fibrillation with intermittent rapid rate and pleural effusions.    Denis Mart MD    Primary Care Physician   Dheeraj Jj    Reason for Consult   Reason for consult: I was asked by Dr Crisostomo to evaluate this patient for afib.    History of Present Illness   77 y.o woman with CAD s/p CABG x 5, CKD V, hypertension and lymphedema, who was transferred from [facility]. Pt fell 5 days before presenting to [facility] ER for the fall. Xrays showed fracture interior scapula and LLL. She was advised inpatient treatment, but she wanted to go home. She was discharged with Levaquin, only to came back with worsening cough. She was then diagnosed with sepsis presumed from PNA. She was started on IV antibiotics, and she was transferred her for further management.   At [facility], she started having intermittent atrial fibrillation with rapid ventricular rate and therefore we were consulted. The episodes of atrial fibrillation occurring during dialysis that has been started last week. She also has developed end-stage renal disease and is currently started on dialysis. Nephrology is following. She's had a history of atrial flutter ablation in June 2017. At that time, warfarinto aspirin after successful ablation. Her metoprolol dose was decreased because of bradycardia.    With rapid atrial fibrillation, she's been on intravenous diltiazem for rate control. She is on metoprolol 37.5 mg twice daily. Echo this month had shown normal ejection fraction with mild to moderate RV dilatation and RV dysfunction. Mild/moderate pulmonary hypertension was noted.    At this time, she continues to be on high flow oxygen. She denies chest pain.  EKG on 10/4/17 was reviewed by me and revealed atrial fibrillation with rapid rate with ventricular couplet.    Patient Active Problem List   Diagnosis     Restless leg syndrome     Sleep apnea     Lipoma of other skin and subcutaneous tissue     ESOPHAGEAL REFLUX      Postsurgical aortocoronary bypass status     Iron deficiency anemia     History of peptic ulcer disease     Edema     Kidney stone     Hyperlipidemia LDL goal <100     Advanced directives, counseling/discussion     CAD - NSTEMI, CABG x 5 2007, angio in 2013 with patent grafts other than occluded graft to PL     Hx of non-ST elevation myocardial infarction (NSTEMI)     Health Care Home     Lymphedema     Anemia of chronic renal failure, stage 4 (severe) (H)     Renal failure     Osteoarthritis of hip     Non morbid obesity, unspecified obesity type     Type 2 diabetes mellitus with other circulatory complications     Long-term (current) use of anticoagulants [Z79.01]     Essential hypertension with goal blood pressure less than 140/90     Atrial fibrillation with rapid ventricular response (H)     Rash - redness medial thighs     Metabolic acidosis, normal anion gap (NAG)     Pulmonary hypertension     Chronic heart failure (H) with preserved EF     Atrial flutter (H) - present 6/12     Generalized edema - anasarca     Hypertensive heart and chronic kidney disease with heart failure and stage 1 through stage 4 chronic kidney disease, or unspecified chronic kidney disease     Supratherapeutic INR     Proteinuria 2.1 g/g Cr     Bradycardia     Acute on chronic renal failure (H)     Memory loss     Chronic atrial fibrillation (H) on 6/12-6/13     Blood in stool     Microscopic hematuria     Acute kidney injury (nontraumatic) (H)     CHF (congestive heart failure) (H)     Anemia     Anemia, iron deficiency     Acidosis     Pulmonary edema     Volume overload     CKD (chronic kidney disease) stage 5, GFR less than 15 ml/min (H)     Anemia in stage 5 chronic kidney disease (H)     Septic shock (H)       Past Medical History   I have reviewed this patient's medical history and updated it with pertinent information if needed.   Past Medical History:   Diagnosis Date     Carpal tunnel syndrome      Coronary atherosclerosis of  native coronary artery 2006    5 vessel CABG     OBSTRUCTIVE SLEEP APNEA     using CPAP     Osteoarthrosis, unspecified whether generalized or localized, unspecified site     generalized arthritis, particularly in her hands and feet         Other and combined forms of senile cataract 2000    Bilateral     Other and unspecified hyperlipidemia      RESTLESS LEGS SYNDROME      TENOSYNOV HAND/WRIST NEC 6/30/2006     Type II or unspecified type diabetes mellitus without mention of complication, not stated as uncontrolled 2001    Diabetes mellitus/pt is diet controlled with weight loss     Typical atrial flutter (H) 01/17/2007    ablation 6/7/2017     Unspecified essential hypertension        Past Surgical History   I have reviewed this patient's surgical history and updated it with pertinent information if needed.  Past Surgical History:   Procedure Laterality Date     ANGIOPLASTY       C APPENDECTOMY       C CABG, VEIN, FIVE  12/06    SVG x 4 and LIMA to LAD     C SOTO W/O FACETEC FORAMOT/DSKC 1/2 VRT SEG, LUMBAR  1968     C REMV CATARACT INTRACAP,INSERT LENS      Bilateral     C TOTAL ABDOM HYSTERECTOMY  1995     - fibroids     C VAGOTOMY/PYLOROPLASTY,SELECT  1970     COLONOSCOPY  12/3/2007     COLONOSCOPY  1/17/2014    Procedure: COLONOSCOPY;  Colonoscopy;  Surgeon: Zack Stearns MD;  Location:  GI     CYSTOSCOPY  11/25/09    [facility]     ENDOSCOPY  03/21/2000    Upper GI     HC COLONOSCOPY THRU STOMA, DIAGNOSTIC  10/7/04    poor prep, repeat in 2-3 years     HC COLONOSCOPY W/WO BRUSH/WASH  10/31/07    Repeat-poor quality prep     HC REMOVAL OF OVARY/TUBE(S)      Salpingo-Oophorectomy, Unilateral     HC REVISE MEDIAN N/CARPAL TUNNEL SURG      Carpal tunnel release     HC UGI ENDOSCOPY DIAG W BIOPSY  10/31/07     HC UGI ENDOSCOPY, SIMPLE EXAM  12/3/2007     OPEN REDUCTION INTERNAL FIXATION HIP NAILING Left 7/3/2016    Procedure: OPEN REDUCTION INTERNAL FIXATION HIP NAILING;  Surgeon: Lake Schulz MD;   Location:  OR       Prior to Admission Medications   Prior to Admission Medications   Prescriptions Last Dose Informant Patient Reported? Taking?   ACETAMINOPHEN PO prn med  Yes Yes   Sig: Take 650 mg by mouth every 4 hours as needed for pain For pain 1-5 out of 10   Calcium Carb-Cholecalciferol 500-400 MG-UNIT TABS   Yes Yes   Sig: Take 1 tablet by mouth 2 times daily   Lactobacillus (ACIDOPHILUS) tablet   No No   Sig: Take 1 tablet by mouth daily   ORDER FOR DME   No No   Sig: Equipment being ordered: cpap machine, mask, humidifier, tubing and filters   Warfarin Sodium (COUMADIN PO)   Yes Yes   Sig: Take by mouth daily Plan was: 2 mg 9/28, 9/29, 10/1  1 mg 9/30    Take as directed per INR results   amLODIPine (NORVASC) 5 MG tablet   No Yes   Sig: Take 2 tablets (10 mg) by mouth daily   aspirin 81 MG tablet   No Yes   Sig: Take 1 tablet (81 mg) by mouth daily   calcitRIOL (ROCALTROL) 0.5 MCG capsule   No Yes   Sig: Take 1 capsule (0.5 mcg) by mouth daily   calcium acetate (PHOSLO) 667 MG CAPS capsule   No Yes   Sig: Take 1 capsule (667 mg) by mouth 3 times daily (with meals)   Patient taking differently: Take 1,334 mg by mouth 3 times daily (with meals)    cyanocobalamin (VITAMIN B12) 1000 MCG/ML injection   Yes Yes   Sig: Inject 1 mL into the muscle every 30 days   darbepoetin hira (ARANESP, ALBUMIN FREE,) 60 MCG/0.3ML injection   No Yes   Sig: Inject 0.3 mLs (60 mcg) Subcutaneous every 14 days As needed for hgb<10g/dL.  If Hgb increases >1 point in 2 weeks (if blood transfusion given, use hgb PRIOR to this), SYSTOLIC BP > 180 mmHg or hgb>=10g/dL, HOLD DOSE. Dose must be within 1 week of Hgb.  Per anemia protocol with Vibha Barfield MD/Yesy Samaniego,PharmD [phone]   ferrous sulfate (IRON) 325 (65 FE) MG tablet   No Yes   Sig: Take 1 tablet (325 mg) by mouth daily (with breakfast)   Patient taking differently: Take 325 mg by mouth 2 times daily    fluticasone (FLONASE) 50 MCG/ACT spray   No No   Sig: Spray 1  spray into both nostrils daily   gabapentin (NEURONTIN) 300 MG capsule   No Yes   Sig: Take 1 capsule (300 mg) by mouth At Bedtime   guaiFENesin-dextromethorphan (ROBITUSSIN DM) 100-10 MG/5ML syrup prn med  No Yes   Sig: Take 5 mLs by mouth every 8 hours as needed for cough or congestion   ipratropium - albuterol 0.5 mg/2.5 mg/3 mL (DUONEB) 0.5-2.5 (3) MG/3ML neb solution   Yes No   Sig: Take 1 vial by nebulization 3 times daily   levofloxacin (LEVAQUIN) 500 MG tablet   No No   Sig: Take 1 tablet (500 mg) by mouth every other day for 5 doses   metoprolol (LOPRESSOR) 50 MG tablet   No Yes   Sig: Take 0.5 tablets (25 mg) by mouth 2 times daily   nitroGLYcerin (NITROSTAT) 0.4 MG sublingual tablet prn med  No Yes   Sig: Place 1 tablet (0.4 mg) under the tongue every 5 minutes as needed for chest pain   pentoxifylline (TRENTAL) 400 MG CR tablet   No Yes   Sig: Take 1 tablet (400 mg) by mouth daily   pramipexole (MIRAPEX) 0.125 MG tablet   No Yes   Sig: Take 1 tablet (0.125 mg) by mouth 3 times daily   pravastatin (PRAVACHOL) 40 MG tablet   No Yes   Sig: Take 1 tablet (40 mg) by mouth daily   sodium bicarbonate 650 MG tablet   No Yes   Sig: TAKE ONE TABLET BY MOUTH THREE TIMES DAILY   torsemide (DEMADEX) 20 MG tablet   No Yes   Sig: Take 2 tablets (40 mg) by mouth 2 times daily   vancomycin (VANOCIN) 50 mg/mL LIQD solution   No No   Sig: Take 2.5 mLs (125 mg) by mouth 4 times daily for 14 days      Facility-Administered Medications: None     Current Facility-Administered Medications   Medication Dose Route Frequency     albumin human  50 g Intravenous Once     metoprolol  37.5 mg Oral BID     insulin aspart  1-7 Units Subcutaneous Q4H     ipratropium - albuterol 0.5 mg/2.5 mg/3 mL  1 vial Nebulization TID     pentoxifylline  400 mg Oral Daily     sodium chloride (PF)  10 mL Intracatheter Q8H     meropenem  500 mg Intravenous Q24H     bumetanide  2 mg Intravenous Q8H     cholecalciferol  5,000 Units Oral Daily      calcitRIOL  0.25 mcg Oral Daily     heparin  5,000 Units Subcutaneous Q8H     - MEDICATION INSTRUCTIONS for Dialysis Patients -   Does not apply See Admin Instructions     Current Facility-Administered Medications   Medication Last Rate     diltiazem (CARDIZEM) infusion ADULT 15 mg/hr (10/08/17 1024)     dextrose 5% and 0.45% NaCl 20 mL/hr at 10/06/17 1323     IV fluid REPLACEMENT ONLY       Allergies   Allergies   Allergen Reactions     Ciprofloxacin Nausea and Vomiting     Zofran did not help     Oxycodone Visual Disturbance     Delusions, blackouts      Lisinopril Cough     Sulfa Drugs GI Disturbance     LOSS OF TASTE       Social History    reports that she quit smoking about 48 years ago. She quit after 10.00 years of use. She has never used smokeless tobacco. She reports that she drinks alcohol. She reports that she does not use illicit drugs.    Family History   Family History   Problem Relation Age of Onset     DIABETES Father      AODM     Neurologic Disorder Father      Parkinson's     Blood Disease Father       from blood clot from leg to lung immediately after hip fracture     DIABETES Paternal Grandmother      adult onset     DIABETES Maternal Grandmother      adult onset     Hypertension No family hx of      CEREBROVASCULAR DISEASE No family hx of        Review of Systems   The comprehensive 10 point Review of Systems is negative other than noted in the HPI or here.     Physical Exam   Vital Signs with Ranges  Temp:  [98.1  F (36.7  C)-98.9  F (37.2  C)] 98.1  F (36.7  C)  Heart Rate:  [] 99  Resp:  [0-34] 23  BP: ()/() 117/70  FiO2 (%):  [70 %-95 %] 90 %  SpO2:  [81 %-100 %] 100 %  Wt Readings from Last 4 Encounters:   10/08/17 68.8 kg (151 lb 10.8 oz)   10/01/17 77.3 kg (170 lb 8 oz)   17 77.1 kg (170 lb)   17 76.2 kg (168 lb)     I/O last 3 completed shifts:  In: 814.75 [P.O.:360; I.V.:204.75]  Out: 1320 [Urine:1320]      Vitals: /70  Pulse 114  Temp 98.1  F  (36.7  C) (Oral)  Resp 23  Wt 68.8 kg (151 lb 10.8 oz)  SpO2 100%  BMI 26.04 kg/m2    Constitutional:   awake     Eyes:   extra-ocular muscles intact     ENT:   normocepalic, without obvious abnormality     Neck:   jugular venous distention present 10 cm above sternal notch     Back:   no curvature     Lungs:   crackles right base, left base and decreased AE, left greater than right     Cardiovascular:   irregularly irregular rhythm     Abdomen:   soft and non-distended     Musculoskeletal:   tone is normal     Neurologic:   Motor Exam:  moves all extremities well and symmetrically     Neuropsychiatric:   Level of consciousness: alert / normal     Skin:   no rashes       No lab results found in last 7 days.    Invalid input(s): TROPONINIES      Recent Labs  Lab 10/08/17  1230 10/08/17  0411 10/07/17  1825 10/07/17  0855 10/07/17  0442 10/06/17  1330  10/06/17  0930  10/04/17  0440  10/03/17  0425 10/02/17  2100  10/01/17  2145   WBC 8.3  --   --   --  7.2 8.2  --   --   < > 9.0  --  10.8  --   < > 6.7   HGB 7.2*  --   --   --  7.6* 7.7*  --   --   < > 7.1*  < > 6.6*  --   < > 8.1*   MCV 88  --   --   --  86 86  --   --   < > 82  --  81  --   < > 85   *  --   --   --  144* 159  --   --   < > 182  --  219  --   < > 257   INR  --   --   --   --   --   --   --  0.95  --   --   --  1.82* 2.37*  < >  --    NA  --  145* 144  --  147* 148*  --   --   < > 144  --  144 146*  < > 145*   POTASSIUM  --  3.4 3.6 3.6 3.2* 3.4  < > Canceled, Test credited  < > 3.2*  --  3.9 5.1  < > 5.6*   CHLORIDE  --  108 107  --  109 109  --   --   < > 109  --  112* 119*  < > 120*   CO2  --  29 27  --  31 26  --   --   < > 23  --  21 14*  < > 11*   BUN  --  24 21  --  23 19  --   --   < > 26  --  37* 61*  < > 60*   CR  --  3.28* 2.93*  --  3.51* 2.98*  --   --   < > 3.19*  --  3.78* 5.24*  < > 5.23*   GFRESTIMATED  --  14* 16*  --  13* 15*  --   --   < > 14*  --  12* 8*  < > 8*   GFRESTBLACK  --  17* 19*  --  15* 19*  --   --   < >  17*  --  14* 10*  < > 10*   ANIONGAP  --   --  10  --  7 13  --   --   < > 12  --  11 13  < > 14   MALICK  --  7.7* 7.7*  --  7.6* 7.5*  --   --   < > 6.6*  --  6.6* 6.5*  < > 7.0*   GLC  --  137* 211*  --  146* 151*  --   --   < > 157*  --  141* 163*  < > 323*   ALBUMIN  --  2.2* 2.1*  --   --   --   --   --   --   --   --   --   --   --  2.0*   PROTTOTAL  --  5.1*  --   --   --   --   --   --   --  4.8*  --   --   --   --  5.5*   BILITOTAL  --  0.4  --   --   --   --   --   --   --   --   --   --   --   --  0.3   ALKPHOS  --  152*  --   --   --   --   --   --   --   --   --   --   --   --  197*   ALT  --  10  --   --   --   --   --   --   --   --   --   --   --   --  16   AST  --  12  --   --   --   --   --   --   --   --   --   --   --   --  16   < > = values in this interval not displayed.  Recent Labs   Lab Test  04/12/16   1132  06/08/15   1140  01/23/14   0909   CHOL  127  97  133   HDL  65  55  51   LDL  47  15  62   TRIG  73  136  99   CHOLHDLRATIO   --   1.8  3.0       Recent Labs  Lab 10/08/17  1230 10/07/17  0442 10/06/17  1330  10/03/17  1206   WBC 8.3 7.2 8.2  < >  --    HGB 7.2* 7.6* 7.7*  < >  --    HCT 23.0* 24.0* 24.4*  < >  --    MCV 88 86 86  < >  --    * 144* 159  < >  --    IRON  --   --   --   --  38   IRONSAT  --   --   --   --  43   FEB  --   --   --   --  89*   GWEN  --   --   --   --  376*   < > = values in this interval not displayed.    Recent Labs  Lab 10/08/17  1238 10/04/17  1040 10/02/17  2100 10/02/17  1440   PH 7.39  --   --   --    PHV  --  7.29*  Canceled:  Specimen improperly labeled. 7.06* 7.09*   PO2 97  --   --   --    PO2V  --  46  Canceled:  Specimen improperly labeled. 39 43   PCO2 46*  --   --   --    PCO2V  --  47  Canceled:  Specimen improperly labeled. 50 45   HCO3 27  --   --   --    HCO3V  --  22  Canceled:  Specimen improperly labeled. 14* 14*     No results for input(s): NTBNPI, NTBNP in the last 168 hours.  No results for input(s): DD in the last 168  hours.  No results for input(s): SED, CRP in the last 168 hours.    Recent Labs  Lab 10/08/17  1230 10/07/17  0442 10/06/17  1330   * 144* 159     No results for input(s): TSH in the last 168 hours.    Recent Labs  Lab 10/01/17  2255   COLOR Yellow   APPEARANCE Cloudy   URINEGLC 50*   URINEBILI Negative   URINEKETONE Negative   SG 1.012   UBLD Small*   URINEPH 5.0   PROTEIN 30*   NITRITE Negative   LEUKEST Large*   RBCU 11*   WBCU >182*       Imaging:  Recent Results (from the past 48 hour(s))   XR Chest Port 1 View    Narrative    XR CHEST PORT 1 VW 10/7/2017 12:17 PM    HISTORY: pneumonia      Impression    IMPRESSION: Tip of central line in distal SVC. Sternotomy. Diffuse  bilateral pulmonary infiltrates. These have improved in the right  upper lobe compared to 10/5/2017, yet there is increased infiltrate in  the left lung base compared to the previous exam.    LAYNE POWELL MD       Echo:  Recent Results (from the past 4320 hour(s))   ECHO COMPLETE WITH OPTISON    Narrative    [id number]  [id number]  [id number]  [id number]^CHERYLE^GEOVANNA^[facility],[place]  Echocardiography Laboratory  [address] [place], [place] [address]     Name: MARI HERNANDEZ  MRN: [id number]  : 1940  Study Date: 2017 11:34 AM  Age: 77 yrs  Gender: Female  [place] [facility] [place]  Reason For Study: CHF  Ordering Physician: GEOVANNA LOBATO  Referring Physician: SELF, REFERRED  Performed By: Vanita Carr RDCS     BSA: 1.9 m2  Height: 64 in  Weight: 183 lb  BP: 100/74 mmHg  _____________________________________________________________________________  __        Procedure  Echocardiogram with two-dimensional, color and spectral Doppler performed.  Contrast Optison. Optison (NDC #0450-5867-73) given intravenously. Patient was  given 3 ml mixture of 3 ml Optison and 6 ml saline. 6 ml wasted.  _____________________________________________________________________________  __         Interpretation Summary  Global and regional left ventricular function is normal with an EF of 60-65%.  Global right ventricular function is mildly to moderately reduced.  Estimated pulmonary artery systolic pressure is 44 mmHg plus right atrial  pressure. Estiamted mean RA pressure is 15 mmHg.  No pericardial effusion is present.  _____________________________________________________________________________  __        Left Ventricle  Left ventricular size is normal. Left ventricular wall thickness is normal.  Global and regional left ventricular function is normal with an EF of 60-65%.  There are features of diastolic dysfunction in the setting of moderate mitral  annular calcification. No regional wall motion abnormalities are seen.     Right Ventricle  Mild to moderate right ventricular dilation is present. Global right  ventricular function is mildly to moderately reduced.     Atria  Severe biatrial enlargement is present.     Mitral Valve  Moderate mitral annular calcification is present. Mild mitral insufficiency is  present.        Aortic Valve  The aortic valve is tricuspid. Mild to moderate aortic valve sclerosis is  present.     Tricuspid Valve  Mild tricuspid insufficiency is present. Estimated pulmonary artery systolic  pressure is 44 mmHg plus right atrial pressure.     Pulmonic Valve  Trace to mild pulmonic insufficiency is present.     Vessels  The aorta root is normal. Dilation of the inferior vena cava is present with  abnormal respiratory variation in diameter. Estimated mean right atrial  pressure is 15 mmHg.     Pericardium  No pericardial effusion is present.        Compared to Previous Study  This study was compared with the study from 7.7.17, no significant changes .  _____________________________________________________________________________  __  MMode/2D Measurements & Calculations     IVSd: 0.87 cm  LVIDd: 4.5 cm  LVIDs: 2.7 cm  LVPWd: 1.1 cm  FS: 40.2 %  EDV(Teich): 92.4 ml  ESV(Teich):  26.8 ml  LV mass(C)d: 148.4 grams  LV mass(C)dI: 78.8 grams/m2  asc Aorta Diam: 3.4 cm  LVOT diam: 2.2 cm  LVOT area: 3.8 cm2  LA Volume (BP): 102.0 ml  LA Volume Index (BP): 54.3 ml/m2           Doppler Measurements & Calculations  MV E max micah: 111.0 cm/sec  MV A max micah: 53.1 cm/sec  MV E/A: 2.1  MV dec time: 0.20 sec  PA acc time: 0.11 sec  TR max micah: 317.0 cm/sec  TR max P.6 mmHg  Lateral E/e': 21.9  Medial E/e': 29.4           _____________________________________________________________________________  __           Report approved by: Adams Fallon 2017 12:58 PM      Echocardiogram Limited    Narrative    [id number]  [id number]  [id number]  [id number]^LORENA^SARTHAK^                                                                          Version 2        [facility],[place]  Echocardiography Laboratory  [address] [place] [id number]     Name: MARI HERNANDEZ  MRN: [id number]  : 1940  Study Date: 2017 08:14 AM  Age: 77 yrs  Gender: Female  Patient Location: [facility]  Reason For Study: Heart Failure  Ordering Physician: SARTHAK CARRILLO  Referring Physician: SELF, REFERRED  Performed By: KIAH Ball     BSA: 1.8 m2  Height: 64 in  Weight: 174 lb  BP: 105/85 mmHg  _____________________________________________________________________________  __     _____________________________________________________________________________  __        Interpretation Summary  Limited study.  Global and regional left ventricular function is normal with an EF of 60-65%.  Mild to moderate right ventricular dilation is present.  Global right ventricular function is moderately reduced.  Right ventricular systolic pressure is 51mmHg above the right atrial pressure.  Dilation of the inferior vena cava is present with abnormal respiratory  variation in diameter.  _____________________________________________________________________________  __        Left  Ventricle  Limited study. Global and regional left ventricular function is normal with an  EF of 60-65%. Left ventricular wall thickness is normal. Left ventricular size  is normal. Diastolic function not assessed due to severe MAC. No regional wall  motion abnormalities are seen.     Right Ventricle  Mild to moderate right ventricular dilation is present. Global right  ventricular function is moderately reduced.     Atria  Moderate to severe biatrial enlargement is present.     Mitral Valve  Moderate to severe mitral annular calcification is present. Mild mitral  insufficiency is present.        Aortic Valve  Mild to moderate aortic valve sclerosis is present. Trace aortic insufficiency  is present.     Tricuspid Valve  Mild to moderate tricuspid insufficiency is present. Right ventricular  systolic pressure is 51mmHg above the right atrial pressure.     Vessels  Dilation of the inferior vena cava is present with abnormal respiratory  variation in diameter.     Pericardium  No pericardial effusion is present.     _____________________________________________________________________________  __  MMode/2D Measurements & Calculations  LA Volume (BP): 95.9 ml        Doppler Measurements & Calculations  MV E max micah: 141.5 cm/sec  MV A max micah: 70.8 cm/sec  MV E/A: 2.0  MV dec slope: 818.3 cm/sec2  MV dec time: 0.17 sec  TV max P.0 mmHg     TR max micah: 342.2 cm/sec  TR max P.0 mmHg  Lateral E/e': 15.8  Medial E/e': 22.8           _____________________________________________________________________________  __           Report approved by: Adams Ulloa 2017 09:07 AM      Echocardiogram    Narrative    [id number]  ECH19  [id number]  [id number]^LUIS FERNANDO^EDUARDO^FATOU           [facility]  Echocardiography Laboratory  [address] [place] [address]        Name: MARI HERNANDEZ  MRN: [id number]  : 1940  Study Date: 2017 10:41 AM  Age: 77 yrs  Gender: Female  Patient  Location: [facility]  Reason For Study: CHF  History: CHF,HTN,Diabetes,Atrial Fibrillation,Pulmonary HTN,ANABEL  Ordering Physician: EDUARDO GOULD  Performed By: Devi Moody     BSA: 2.0 m2  Height: 64 in  Weight: 219 lb  HR: 63  BP: 114/55 mmHg  _____________________________________________________________________________  __        Procedure  Complete Portable Echo Adult.  _____________________________________________________________________________  __        Interpretation Summary     The rhythm was atrial fibrillation.  Left ventricular systolic function is normal.  The visual ejection fraction is estimated at 60-65%.  The left ventricle is normal in size.  There is mild concentric left ventricular hypertrophy.  The right ventricle is normal in structure, function and size.  There is moderate biatrial enlargement.  There is mild (1+) mitral regurgitation.  Right ventricular systolic pressure is elevated, consistent with mild  pulmonary hypertension.     Since the last study 7/2/2016, the patient has developed new atrial  fibrillation with a controlled ventricular response rate and there has been a  reduction in estimated PA systolic pressure from 68 mmHg + RAP to 31 mmHg +  RAP.  _____________________________________________________________________________  __        Left Ventricle  The left ventricle is normal in size. There is mild concentric left  ventricular hypertrophy. Left ventricular systolic function is normal. The  visual ejection fraction is estimated at 60-65%. No regional wall motion  abnormalities noted. There is no thrombus seen in the left ventricle.     Right Ventricle  The right ventricle is normal in structure, function and size. There is no  mass or thrombus in the right ventricle.     Atria  There is moderate biatrial enlargement. There is no atrial shunt seen.     Mitral Valve  There is moderate mitral annular calcification. There is mild (1+) mitral  regurgitation. There is no mitral valve  stenosis.        Tricuspid Valve  Normal tricuspid valve. The right ventricular systolic pressure is  approximated at 30.6 mmHg plus the right atrial pressure. Right ventricular  systolic pressure is elevated, consistent with mild pulmonary hypertension.  There is no tricuspid stenosis.     Aortic Valve  There is moderate trileaflet aortic sclerosis. There is trace to mild aortic  regurgitation. No aortic stenosis is present.     Pulmonic Valve  Normal pulmonic valve. There is mild (1+) pulmonic valvular regurgitation.  There is no pulmonic valvular stenosis.     Vessels  The aortic root is normal size. Normal size ascending aorta. The IVC is normal  in size and reactivity with respiration, suggesting normal central venous  pressure. The pulmonary artery is normal size.     Pericardium  The pericardium appears normal. There is no pleural effusion.        Rhythm  The rhythm was atrial fibrillation.  _____________________________________________________________________________  __  MMode/2D Measurements & Calculations     IVSd: 1.1 cm  LVIDd: 3.8 cm  LVIDs: 2.5 cm  LVPWd: 1.2 cm  FS: 34.2 %  EDV(Teich): 62.0 ml  ESV(Teich): 22.3 ml  LV mass(C)d: 146.6 grams  LV mass(C)dI: 72.1 grams/m2  Ao root diam: 2.7 cm  LA dimension: 4.8 cm  asc Aorta Diam: 3.1 cm  LA/Ao: 1.8  LVOT diam: 1.8 cm  LVOT area: 2.5 cm2  LA Volume (BP): 80.0 ml  LA Volume Index (BP): 39.4 ml/m2  TAPSE: 1.3 cm           Doppler Measurements & Calculations  MV E max micah: 122.3 cm/sec  MR ERO: 0.11 cm2  MR volume: 14.1 ml  PA acc time: 0.10 sec  TR max micah: 256.7 cm/sec  TR max P.6 mmHg  Lateral E/e': 13.4  Medial E/e': 17.3           _____________________________________________________________________________  __           Report approved by: Dr. Rajinder Ventura 2017 12:38 PM

## 2017-10-08 NOTE — PLAN OF CARE
Problem: Patient Care Overview  Goal: Plan of Care/Patient Progress Review  Outcome: Therapy, progress toward functional goals is gradual  Patient has had an uneventful day.  Vitals stable. Cardiology saw patient for consult.  Nephrology here to see.  Diuril ordered times one and given. Given clonopin this Am for anxiety.  Up in chair times one hour tolerated fair. Appetite is improving.  Had 3 meals this day.  Ate fair.  Assessment as noted. Will continue to monitor closely.  On BiPAP for a few hours this afternoon.

## 2017-10-08 NOTE — PROGRESS NOTES
Renal Medicine Progress Note            Assessment/Plan:     77 y.o woman admitted on 10/2 for PNA and ADHF, consulted for worsening kidney function.     1. 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               -started HD 10/2               -RIJ tunneled placed 10/6    -goes into intermittent RVR with HD      2. Infection/PNA. CXR on 10/7 showed diffuse bilateral infiltrate, improved RUL.      -procalcitonin is not high                -on meropenem      3. ADHF. TTE with EF 60-65%, diastolic function indeterminate, moderate RV dysfuntion.    -net net ~ 8 liters since admission   -on Bumex 2 grams IV q8hrs      4. Anemia. FE storage is on the low side.    -SUELLEN and IV Fe with HD      5. CKD-MBD. Hypocalcemia and 2HPTH   -started on vitamin Ds      6. Recurrent Afib with RVR.    -on dilt gtt and metoprolol      7. Hypokalemia. Replete K per protocol.      Plan.   1. D/C Norvasc  2. Will give a dose of diuril  3. Replete K per protocol  4. Renal panel in AM  5. Plan for HD for clearance and UF tomorrow. Will prime with albumin as she tends to go into RVR during HD.          Interval History:     Patient remains on HFNC and intermittent BiPAP. CXR on 10/7 showed diffuse bilateral infiltrate. She is net neg ~ 8 liters since admission.           Medications and Allergies:       albumin human  50 g Intravenous Once     metoprolol  37.5 mg Oral BID     amLODIPine  10 mg Oral Daily     insulin aspart  1-7 Units Subcutaneous Q4H     ipratropium - albuterol 0.5 mg/2.5 mg/3 mL  1 vial Nebulization TID     pentoxifylline  400 mg Oral Daily     sodium chloride (PF)  10 mL Intracatheter Q8H     meropenem  500 mg Intravenous Q24H     bumetanide  2 mg Intravenous Q8H     cholecalciferol  5,000 Units Oral Daily     calcitRIOL  0.25 mcg Oral Daily     heparin  5,000 Units Subcutaneous Q8H     - MEDICATION INSTRUCTIONS for Dialysis Patients -   Does not apply See Admin Instructions        Allergies   Allergen Reactions     Ciprofloxacin Nausea and  Vomiting     Zofran did not help     Oxycodone Visual Disturbance     Delusions, blackouts      Lisinopril Cough     Sulfa Drugs GI Disturbance     LOSS OF TASTE            Physical Exam:   Vitals were reviewed  Heart Rate: 95, Blood pressure 109/76, pulse 114, temperature 98.3  F (36.8  C), temperature source Oral, resp. rate 17, weight 68.8 kg (151 lb 10.8 oz), SpO2 98 %, not currently breastfeeding.    Wt Readings from Last 3 Encounters:   10/08/17 68.8 kg (151 lb 10.8 oz)   10/01/17 77.3 kg (170 lb 8 oz)   09/22/17 77.1 kg (170 lb)       Intake/Output Summary (Last 24 hours) at 10/08/17 0949  Last data filed at 10/08/17 0600   Gross per 24 hour   Intake           694.75 ml   Output             1020 ml   Net          -325.25 ml     GENERAL APPEARANCE: NAD  HEENT:  EOMI, on HFNC  RESP: Very tight airflow. No wheezes.  CV: irregular/irregular. Tachy.  ABDOMEN: soft, NT  EXTREMITIES/SKIN:  Edema much better  Neuro:  A/o x 3  RIJ tunneled CVC         Data:     CBC RESULTS:     Recent Labs  Lab 10/07/17  0442 10/06/17  1330 10/05/17  0350 10/04/17  0440 10/03/17  1755 10/03/17  0820 10/03/17  0425 10/02/17  1440   WBC 7.2 8.2 11.8* 9.0  --   --  10.8 11.6*   RBC 2.79* 2.83* 2.96* 2.62*  --   --  2.50* 3.01*   HGB 7.6* 7.7* 7.9* 7.1* 7.5* 6.3* 6.6* 7.9*   HCT 24.0* 24.4* 24.7* 21.6*  --   --  20.3* 24.7*   * 159 197 182  --   --  219 300       Basic Metabolic Panel:    Recent Labs  Lab 10/08/17  0411 10/07/17  1825 10/07/17  0855 10/07/17  0442 10/06/17  1330 10/06/17  1010  10/05/17  0350 10/04/17  0440 10/03/17  0425   NA  --  144  --  147* 148*  --   --  145* 144 144   POTASSIUM 3.4 3.6 3.6 3.2* 3.4 3.2*  < > 3.2* 3.2* 3.9   CHLORIDE  --  107  --  109 109  --   --  108 109 112*   CO2  --  27  --  31 26  --   --  26 23 21   BUN  --  21  --  23 19  --   --  25 26 37*   CR  --  2.93*  --  3.51* 2.98*  --   --  2.90* 3.19* 3.78*   GLC  --  211*  --  146* 151*  --   --  168* 157* 141*   MALICK  --  7.7*  --  7.6*  7.5*  --   --  7.7* 6.6* 6.6*   < > = values in this interval not displayed.    INR  Recent Labs  Lab 10/06/17  0930 10/03/17  0425 10/02/17  2100 10/02/17  1045   INR 0.95 1.82* 2.37* 6.00*      Attestation:   I have reviewed today's relevant vital signs, notes, medications, labs and imaging.    Davey Palma MD  [facility] - Nephrology  Office phone :[phone]  Pager: [phone]

## 2017-10-08 NOTE — PROGRESS NOTES
Pt refused Bipap this evening. Advised if SpO2 drops, will need to place on Bipap. Pt understands. SpO2 remains 96% on HFNC at this time. Pt resting comfortably.     Urbano Dunn RT

## 2017-10-09 ENCOUNTER — APPOINTMENT (OUTPATIENT)
Dept: GENERAL RADIOLOGY | Facility: CLINIC | Age: 77
DRG: 871 | End: 2017-10-09
Attending: SURGERY
Payer: MEDICARE

## 2017-10-09 LAB
ABO + RH BLD: NORMAL
ABO + RH BLD: NORMAL
ANION GAP SERPL CALCULATED.3IONS-SCNC: 7 MMOL/L (ref 3–14)
BLD GP AB SCN SERPL QL: NORMAL
BLD PROD TYP BPU: NORMAL
BLD PROD TYP BPU: NORMAL
BLD UNIT ID BPU: 0
BLOOD BANK CMNT PATIENT-IMP: NORMAL
BLOOD PRODUCT CODE: NORMAL
BPU ID: NORMAL
BUN SERPL-MCNC: 29 MG/DL (ref 7–30)
CALCIUM SERPL-MCNC: 7.6 MG/DL (ref 8.5–10.1)
CHLORIDE SERPL-SCNC: 107 MMOL/L (ref 94–109)
CO2 SERPL-SCNC: 31 MMOL/L (ref 20–32)
CREAT SERPL-MCNC: 3.98 MG/DL (ref 0.52–1.04)
ERYTHROCYTE [DISTWIDTH] IN BLOOD BY AUTOMATED COUNT: 18.1 % (ref 10–15)
GFR SERPL CREATININE-BSD FRML MDRD: 11 ML/MIN/1.7M2
GLUCOSE BLDC GLUCOMTR-MCNC: 164 MG/DL (ref 70–99)
GLUCOSE BLDC GLUCOMTR-MCNC: 169 MG/DL (ref 70–99)
GLUCOSE BLDC GLUCOMTR-MCNC: 210 MG/DL (ref 70–99)
GLUCOSE BLDC GLUCOMTR-MCNC: 222 MG/DL (ref 70–99)
GLUCOSE BLDC GLUCOMTR-MCNC: 321 MG/DL (ref 70–99)
GLUCOSE SERPL-MCNC: 187 MG/DL (ref 70–99)
HCT VFR BLD AUTO: 22 % (ref 35–47)
HGB BLD-MCNC: 6.9 G/DL (ref 11.7–15.7)
MAGNESIUM SERPL-MCNC: 2.1 MG/DL (ref 1.6–2.3)
MCH RBC QN AUTO: 27.7 PG (ref 26.5–33)
MCHC RBC AUTO-ENTMCNC: 31.4 G/DL (ref 31.5–36.5)
MCV RBC AUTO: 88 FL (ref 78–100)
NUM BPU REQUESTED: 1
PHOSPHATE SERPL-MCNC: 3.4 MG/DL (ref 2.5–4.5)
PLATELET # BLD AUTO: 130 10E9/L (ref 150–450)
POTASSIUM SERPL-SCNC: 3 MMOL/L (ref 3.4–5.3)
RBC # BLD AUTO: 2.49 10E12/L (ref 3.8–5.2)
SODIUM SERPL-SCNC: 145 MMOL/L (ref 133–144)
SPECIMEN EXP DATE BLD: NORMAL
TRANSFUSION STATUS PATIENT QL: NORMAL
TRANSFUSION STATUS PATIENT QL: NORMAL
WBC # BLD AUTO: 7.9 10E9/L (ref 4–11)

## 2017-10-09 PROCEDURE — 25000125 ZZHC RX 250: Performed by: SURGERY

## 2017-10-09 PROCEDURE — 86923 COMPATIBILITY TEST ELECTRIC: CPT | Performed by: SURGERY

## 2017-10-09 PROCEDURE — 40000239 ZZH STATISTIC VAT ROUNDS

## 2017-10-09 PROCEDURE — 25000132 ZZH RX MED GY IP 250 OP 250 PS 637: Mod: GY | Performed by: SURGERY

## 2017-10-09 PROCEDURE — 25000132 ZZH RX MED GY IP 250 OP 250 PS 637: Mod: GY | Performed by: INTERNAL MEDICINE

## 2017-10-09 PROCEDURE — 90937 HEMODIALYSIS REPEATED EVAL: CPT

## 2017-10-09 PROCEDURE — S0169 CALCITROL: HCPCS | Mod: GY | Performed by: INTERNAL MEDICINE

## 2017-10-09 PROCEDURE — 00000146 ZZHCL STATISTIC GLUCOSE BY METER IP

## 2017-10-09 PROCEDURE — 25000125 ZZHC RX 250: Performed by: INTERNAL MEDICINE

## 2017-10-09 PROCEDURE — A9270 NON-COVERED ITEM OR SERVICE: HCPCS | Mod: GY | Performed by: INTERNAL MEDICINE

## 2017-10-09 PROCEDURE — 86901 BLOOD TYPING SEROLOGIC RH(D): CPT | Performed by: SURGERY

## 2017-10-09 PROCEDURE — 63400005 ZZH RX 634: Performed by: INTERNAL MEDICINE

## 2017-10-09 PROCEDURE — 94640 AIRWAY INHALATION TREATMENT: CPT | Mod: 76

## 2017-10-09 PROCEDURE — 83735 ASSAY OF MAGNESIUM: CPT | Performed by: SURGERY

## 2017-10-09 PROCEDURE — 86850 RBC ANTIBODY SCREEN: CPT | Performed by: SURGERY

## 2017-10-09 PROCEDURE — 25000128 H RX IP 250 OP 636: Performed by: INTERNAL MEDICINE

## 2017-10-09 PROCEDURE — 84100 ASSAY OF PHOSPHORUS: CPT | Performed by: SURGERY

## 2017-10-09 PROCEDURE — 25000131 ZZH RX MED GY IP 250 OP 636 PS 637: Mod: GY | Performed by: NURSE PRACTITIONER

## 2017-10-09 PROCEDURE — A9270 NON-COVERED ITEM OR SERVICE: HCPCS | Mod: GY | Performed by: SURGERY

## 2017-10-09 PROCEDURE — 25000128 H RX IP 250 OP 636: Performed by: SURGERY

## 2017-10-09 PROCEDURE — 20000003 ZZH R&B ICU

## 2017-10-09 PROCEDURE — 25000132 ZZH RX MED GY IP 250 OP 250 PS 637: Mod: GY | Performed by: NURSE PRACTITIONER

## 2017-10-09 PROCEDURE — 85027 COMPLETE CBC AUTOMATED: CPT | Performed by: SURGERY

## 2017-10-09 PROCEDURE — P9040 RBC LEUKOREDUCED IRRADIATED: HCPCS | Performed by: SURGERY

## 2017-10-09 PROCEDURE — S0171 BUMETANIDE 0.5 MG: HCPCS | Performed by: INTERNAL MEDICINE

## 2017-10-09 PROCEDURE — 99232 SBSQ HOSP IP/OBS MODERATE 35: CPT | Performed by: NURSE PRACTITIONER

## 2017-10-09 PROCEDURE — 94660 CPAP INITIATION&MGMT: CPT

## 2017-10-09 PROCEDURE — A9270 NON-COVERED ITEM OR SERVICE: HCPCS | Mod: GY | Performed by: NURSE PRACTITIONER

## 2017-10-09 PROCEDURE — 40000275 ZZH STATISTIC RCP TIME EA 10 MIN

## 2017-10-09 PROCEDURE — 86900 BLOOD TYPING SEROLOGIC ABO: CPT | Performed by: SURGERY

## 2017-10-09 PROCEDURE — 94640 AIRWAY INHALATION TREATMENT: CPT

## 2017-10-09 PROCEDURE — 71010 XR CHEST PORT 1 VW: CPT

## 2017-10-09 PROCEDURE — 80048 BASIC METABOLIC PNL TOTAL CA: CPT | Performed by: SURGERY

## 2017-10-09 PROCEDURE — 99291 CRITICAL CARE FIRST HOUR: CPT | Performed by: INTERNAL MEDICINE

## 2017-10-09 RX ORDER — POTASSIUM CHLORIDE 1500 MG/1
20-40 TABLET, EXTENDED RELEASE ORAL
Status: DISCONTINUED | OUTPATIENT
Start: 2017-10-09 | End: 2017-10-18 | Stop reason: HOSPADM

## 2017-10-09 RX ORDER — ALBUMIN (HUMAN) 12.5 G/50ML
50 SOLUTION INTRAVENOUS
Status: DISCONTINUED | OUTPATIENT
Start: 2017-10-09 | End: 2017-10-09

## 2017-10-09 RX ORDER — DOXERCALCIFEROL 4 UG/2ML
4 INJECTION INTRAVENOUS SEE ADMIN INSTRUCTIONS
Status: DISCONTINUED | OUTPATIENT
Start: 2017-10-09 | End: 2017-10-09

## 2017-10-09 RX ORDER — HEPARIN SODIUM 1000 [USP'U]/ML
500 INJECTION, SOLUTION INTRAVENOUS; SUBCUTANEOUS CONTINUOUS
Status: DISCONTINUED | OUTPATIENT
Start: 2017-10-09 | End: 2017-10-09

## 2017-10-09 RX ORDER — POTASSIUM CHLORIDE 1.5 G/1.58G
20-40 POWDER, FOR SOLUTION ORAL
Status: DISCONTINUED | OUTPATIENT
Start: 2017-10-09 | End: 2017-10-18 | Stop reason: HOSPADM

## 2017-10-09 RX ORDER — WARFARIN SODIUM 2 MG/1
2 TABLET ORAL
Status: COMPLETED | OUTPATIENT
Start: 2017-10-09 | End: 2017-10-09

## 2017-10-09 RX ORDER — HEPARIN SODIUM 1000 [USP'U]/ML
500 INJECTION, SOLUTION INTRAVENOUS; SUBCUTANEOUS
Status: COMPLETED | OUTPATIENT
Start: 2017-10-09 | End: 2017-10-09

## 2017-10-09 RX ORDER — PANTOPRAZOLE SODIUM 40 MG/1
40 TABLET, DELAYED RELEASE ORAL EVERY MORNING
Status: DISCONTINUED | OUTPATIENT
Start: 2017-10-09 | End: 2017-10-18 | Stop reason: HOSPADM

## 2017-10-09 RX ORDER — MAGNESIUM SULFATE HEPTAHYDRATE 40 MG/ML
4 INJECTION, SOLUTION INTRAVENOUS EVERY 4 HOURS PRN
Status: DISCONTINUED | OUTPATIENT
Start: 2017-10-09 | End: 2017-10-18 | Stop reason: HOSPADM

## 2017-10-09 RX ADMIN — CHOLECALCIFEROL CAP 125 MCG (5000 UNIT) 5000 UNITS: 125 CAP at 09:01

## 2017-10-09 RX ADMIN — DOXERCALCIFEROL 4 MCG: 2 INJECTION, SOLUTION INTRAVENOUS at 14:55

## 2017-10-09 RX ADMIN — POTASSIUM CHLORIDE 40 MEQ: 1500 TABLET, EXTENDED RELEASE ORAL at 07:00

## 2017-10-09 RX ADMIN — EPOETIN ALFA 8000 UNITS: 4000 SOLUTION INTRAVENOUS; SUBCUTANEOUS at 14:55

## 2017-10-09 RX ADMIN — Medication 0.25 MG: at 20:37

## 2017-10-09 RX ADMIN — BUMETANIDE 2 MG: 0.25 INJECTION INTRAMUSCULAR; INTRAVENOUS at 15:46

## 2017-10-09 RX ADMIN — PANTOPRAZOLE SODIUM 40 MG: 40 TABLET, DELAYED RELEASE ORAL at 14:41

## 2017-10-09 RX ADMIN — MEROPENEM 500 MG: 500 INJECTION, POWDER, FOR SOLUTION INTRAVENOUS at 15:45

## 2017-10-09 RX ADMIN — Medication 0.25 MG: at 12:20

## 2017-10-09 RX ADMIN — HEPARIN SODIUM 500 UNITS: 1000 INJECTION, SOLUTION INTRAVENOUS; SUBCUTANEOUS at 13:10

## 2017-10-09 RX ADMIN — WARFARIN SODIUM 2 MG: 2 TABLET ORAL at 20:27

## 2017-10-09 RX ADMIN — INSULIN GLARGINE 5 UNITS: 100 INJECTION, SOLUTION SUBCUTANEOUS at 14:42

## 2017-10-09 RX ADMIN — SODIUM CHLORIDE 250 ML: 9 INJECTION, SOLUTION INTRAVENOUS at 13:09

## 2017-10-09 RX ADMIN — METOPROLOL TARTRATE 50 MG: 50 TABLET ORAL at 09:01

## 2017-10-09 RX ADMIN — PENTOXIFYLLINE 400 MG: 400 TABLET, FILM COATED, EXTENDED RELEASE ORAL at 09:01

## 2017-10-09 RX ADMIN — DILTIAZEM HYDROCHLORIDE 15 MG/HR: 5 INJECTION INTRAVENOUS at 01:46

## 2017-10-09 RX ADMIN — METOPROLOL TARTRATE 50 MG: 50 TABLET ORAL at 20:27

## 2017-10-09 RX ADMIN — BUMETANIDE 2 MG: 0.25 INJECTION INTRAMUSCULAR; INTRAVENOUS at 09:00

## 2017-10-09 RX ADMIN — HEPARIN SODIUM 500 UNITS/HR: 1000 INJECTION, SOLUTION INTRAVENOUS; SUBCUTANEOUS at 13:10

## 2017-10-09 RX ADMIN — HEPARIN SODIUM 5000 UNITS: 5000 INJECTION, SOLUTION INTRAVENOUS; SUBCUTANEOUS at 09:01

## 2017-10-09 RX ADMIN — IPRATROPIUM BROMIDE AND ALBUTEROL SULFATE 3 ML: .5; 3 SOLUTION RESPIRATORY (INHALATION) at 19:56

## 2017-10-09 RX ADMIN — CALCITRIOL 0.25 MCG: 0.25 CAPSULE, LIQUID FILLED ORAL at 09:02

## 2017-10-09 RX ADMIN — IPRATROPIUM BROMIDE AND ALBUTEROL SULFATE 3 ML: .5; 3 SOLUTION RESPIRATORY (INHALATION) at 12:17

## 2017-10-09 RX ADMIN — IPRATROPIUM BROMIDE AND ALBUTEROL SULFATE 3 ML: .5; 3 SOLUTION RESPIRATORY (INHALATION) at 07:58

## 2017-10-09 RX ADMIN — HEPARIN SODIUM 5000 UNITS: 5000 INJECTION, SOLUTION INTRAVENOUS; SUBCUTANEOUS at 15:46

## 2017-10-09 NOTE — PROGRESS NOTES
"Placed call to , Urbano for discharge planning. Patient has been at TCU in the past. Explained to Urbano, patient is requiring higher oxygen needs at this time and would cathy a LTACH. Notified him of the two choices and Urbano stated \"[facility] would be good for me to get to.\" He wanted writer to place a call to Daughter for her choice and to see if she had any questions. Writer called Daughter, Jennifer. Jennifer thought [facility] was a good choice too since it was close to her work. Will place referral to [facility] for family. Talked with patient after talking with family. Patient was in agreement with [facility]. She expressed that \"she is getting off the oxygen\".   "

## 2017-10-09 NOTE — PROGRESS NOTES
[facility]  Cardiology Progress Note  Date of Service: 10/09/2017     Assessment & Plan    Kathryn Banks is a 77 year old female with past medical history significant for CAD s/p CABG x 5, atrial flutter s/p ablation and DCCV 6/2017. Admitted on 10/2/2017 with sepsis and pneumonia with PAF RVR.     Assessment:  1.  Paroxysmal Atrial fibrillation  - converted to SR 0200 10/9  - rate controlled   - Echo normal EF 60-65%, pulmonary HTN, diastolic dysfunction, mild-mod reduced RV function  - CHADsVASC 5 age, gender, HTN, DM   - likely from stress of sepsis and pneumonia   - diltiazem IV, po lopressor 50 mg BID, subq hep TID   - will need AC at discharge, previously on coumadin     2. S/P atrial flutter ablation   - DCCV 6/2017    3. CAD   - s/p CABG  - 2007 graft to PL occluded and 4 other grafts patent    4. ESRD   - fluid overload with pleural effusions L>R   - dialysis started 10/2  - may need to consider pleuracentesis if persisting after dialysis with increased O2 requirements  - IV bumex per nephrology     5. Respiratory failure   - managed by intensivist   - high flow nasal cannula       Plan:   1. discontinue diltiazem gtt now that Pt is in SR   2. Restart warfarin       Cyn Shelton  2:31 PM 10/9/2017   [facility]  Pager: [phone]     Interval History   Pt doing well, no symptoms while she was previously in atrial fibrillation. Now in SR. Does have some SOB with respiratory failure and on oxygen currently. Also pleural effusion present, on dialysis and iv bumex.     Physical Exam   Temp: 97.5  F (36.4  C) Temp src: Oral BP: 115/74   Heart Rate: 69 Resp: 18 SpO2: 94 % O2 Device: High Flow Nasal Cannula (HFNC) Oxygen Delivery: Other (Comments) (40lpm)  Vitals:    10/07/17 0430 10/07/17 0743 10/08/17 0630   Weight: 70.5 kg (155 lb 6.8 oz) 70.5 kg (155 lb 6.8 oz) 68.8 kg (151 lb 10.8 oz)       Constitutional   alert and oriented, in no acute distress.  Skin   warm and dry to  touch  ENT   no pallor or cyanosis  Neck  JVP elevated  Chest   no tenderness to palpation   Lungs Decreased lung sounds bilaterally. No wheezes, rales or rhonchi  Cardiac RRR, S1 normal, S2 normal, No S3 or S4, no murmurs, no rubs  Abdomen   abdomen soft  Extremities and Back   no clubbing, cyanosis. No edema.  2 + radial pulse bilaterally  Neurological   no gross motor deficits noted, affect appropriate, oriented to time, person and place.    Medications     heparin (porcine) 500 Units/hr (10/09/17 1310)     diltiazem (CARDIZEM) infusion ADULT 5 mg/hr (10/09/17 0205)     dextrose 5% and 0.45% NaCl 20 mL/hr at 10/08/17 1800     IV fluid REPLACEMENT ONLY         sodium chloride 0.9%  250-1,000 mL Intravenous Once in dialysis     epoetin hira (EPOGEN,PROCRIT) inj ESRD  8,000 Units Intravenous See Admin Instructions     iron sucrose  100 mg Intravenous See Admin Instructions     doxercalciferol  4 mcg Intravenous See Admin Instructions     pantoprazole  40 mg Oral QAM     insulin glargine  5 Units Subcutaneous QAM AC     metoprolol  50 mg Oral BID     albumin human  50 g Intravenous Once     insulin aspart  1-7 Units Subcutaneous Q4H     ipratropium - albuterol 0.5 mg/2.5 mg/3 mL  1 vial Nebulization TID     pentoxifylline  400 mg Oral Daily     sodium chloride (PF)  10 mL Intracatheter Q8H     meropenem  500 mg Intravenous Q24H     bumetanide  2 mg Intravenous Q8H     cholecalciferol  5,000 Units Oral Daily     calcitRIOL  0.25 mcg Oral Daily     heparin  5,000 Units Subcutaneous Q8H     - MEDICATION INSTRUCTIONS for Dialysis Patients -   Does not apply See Admin Instructions       Data   Most Recent 3 CBC's:  Recent Labs   Lab Test  10/09/17   0441  10/08/17   1230  10/07/17   0442   WBC  7.9  8.3  7.2   HGB  6.9*  7.2*  7.6*   MCV  88  88  86   PLT  130*  134*  144*     Most Recent 3 BMP's:  Recent Labs   Lab Test  10/09/17   0441  10/08/17   0411  10/07/17   1825   10/07/17   0442   NA  145*  145*  144   --   147*    POTASSIUM  3.0*  3.4  3.6   < >  3.2*   CHLORIDE  107  108  107   --   109   CO2  31  29  27   --   31   BUN  29  24  21   --   23   CR  3.98*  3.28*  2.93*   --   3.51*   ANIONGAP  7   --   10   --   7   MALICK  7.6*  7.7*  7.7*   --   7.6*   GLC  187*  137*  211*   --   146*    < > = values in this interval not displayed.   Most Recent 3 INR's:  Recent Labs   Lab Test  10/06/17   0930  10/03/17   0425  10/02/17   2100   INR  0.95  1.82*  2.37*   Most Recent 3 Troponin's:  Recent Labs   Lab Test  08/26/17   0637  07/11/17   1604  07/11/17   0940   02/22/14   0024   TROPI  <0.015  0.022  0.022   < >   --    TROPONIN   --    --    --    --   0.01    < > = values in this interval not displayed.   Most Recent 3 BNP's:  Recent Labs   Lab Test  09/22/17   0818  09/13/17   1849  09/06/17   0741  08/26/17   0637  08/12/17   1013   01/22/13   1031   NTBNPI   --   42820*   --   93192*  69395*   < >   --    NTBNP  37279*   --   64330*   --    --    --   1280*    < > = values in this interval not displayed.   Most Recent Cholesterol Panel:  Recent Labs   Lab Test  04/12/16   1132   CHOL  127   LDL  47   HDL  65   TRIG  73   Most Recent TSH and T4:  Recent Labs   Lab Test  04/28/17   1050   TSH  0.75       Last 24 hours labs reviewed   EKG: (reviewed personally) 10/4/17 Atrial fibrillation vent rate 147    Imaging:   10/9/17 0610       IMPRESSION: Persistent bilateral alveolar pulmonary infiltrates,  slightly improved. Moderate left pleural effusion is slightly worse.  Normal heart size. Sternal wires. Catheter from right subclavian  approach with the tip in the upper right atrium. PICC line from the  right with the tip at the cavoatrial junction.    Tele: SR 60s    Echo:   10/3   Interpretation Summary     The visual ejection fraction is estimated at 60-65%.  Normal left ventricular wall motion  Left ventricular diastolic function is indeterminate.  The right ventricle is mild to moderately dilated.  The right ventricular  systolic function is mild to moderately reduced.  The left atrium is moderately dilated.  The right ventricular systolic pressure is approximated at 42mmHg plus the  right atrial pressure.  Normal IVC (1.5-2.5cm) with <50% respiratory collapse; right atrial pressure  is estimated at 10-15mmHg.  Moderate (46-55mmHg) pulmonary hypertension is present.  Moderate left pleural effusion  Sinus rhythm was noted.  Compared to prior study, there is no significant change.

## 2017-10-09 NOTE — PROGRESS NOTES
Assessment and Plan:   ESRD: Started HD on 10/2. RIJ CVC placed 10/6. Run today for UF.             Interval History:   Resp failure: on HFNC O2. Hx of pumonary edema and volume overload.   Anemia  DM  ASCVD: CABG, CHF.                Review of Systems:   Alert, no problems with dialysis.           Medications:       metoprolol  50 mg Oral BID     albumin human  50 g Intravenous Once     insulin aspart  1-7 Units Subcutaneous Q4H     ipratropium - albuterol 0.5 mg/2.5 mg/3 mL  1 vial Nebulization TID     pentoxifylline  400 mg Oral Daily     sodium chloride (PF)  10 mL Intracatheter Q8H     meropenem  500 mg Intravenous Q24H     bumetanide  2 mg Intravenous Q8H     cholecalciferol  5,000 Units Oral Daily     calcitRIOL  0.25 mcg Oral Daily     heparin  5,000 Units Subcutaneous Q8H     - MEDICATION INSTRUCTIONS for Dialysis Patients -   Does not apply See Admin Instructions       diltiazem (CARDIZEM) infusion ADULT 5 mg/hr (10/09/17 0205)     dextrose 5% and 0.45% NaCl 20 mL/hr at 10/08/17 1800     IV fluid REPLACEMENT ONLY       Current active medications and PTA medications reviewed, see medication list for details.            Physical Exam:   Vitals were reviewed  Patient Vitals for the past 24 hrs:   BP Temp Temp src Heart Rate Resp SpO2   10/09/17 0900 108/61 97.3  F (36.3  C) Oral 64 17 -   10/09/17 0845 104/58 - - 65 15 -   10/09/17 0830 113/57 97.2  F (36.2  C) - 72 14 -   10/09/17 0815 119/59 97.3  F (36.3  C) - 77 20 -   10/09/17 0800 115/65 97.2  F (36.2  C) Oral 69 18 92 %   10/09/17 0758 - - - - - 92 %   10/09/17 0745 111/58 - - 62 13 -   10/09/17 0645 91/56 - - 66 22 100 %   10/09/17 0630 106/59 - - 64 16 96 %   10/09/17 0615 105/60 - - 64 16 97 %   10/09/17 0600 106/55 - - 66 13 100 %   10/09/17 0545 101/57 - - 62 20 100 %   10/09/17 0530 95/51 - - 64 16 99 %   10/09/17 0515 99/56 - - 67 15 99 %   10/09/17 0500 94/53 - - 63 18 98 %   10/09/17 0445 105/59 - - 65 12 98 %   10/09/17 0430 94/47 -  - 65 16 97 %   10/09/17 0415 93/51 - - 65 16 97 %   10/09/17 0400 100/57 98.4  F (36.9  C) Oral 66 22 (!) 86 %   10/09/17 0345 105/57 - - 68 24 90 %   10/09/17 0330 109/59 - - 70 22 (!) 89 %   10/09/17 0315 116/83 - - 72 12 90 %   10/09/17 0300 98/55 - - 66 18 92 %   10/09/17 0245 98/59 - - 62 25 92 %   10/09/17 0230 105/58 - - 62 20 93 %   10/09/17 0215 100/61 - - 60 22 92 %   10/09/17 0200 105/56 - - 58 28 (!) 89 %   10/09/17 0145 105/73 - - 66 16 96 %   10/09/17 0130 104/62 - - 68 29 94 %   10/09/17 0115 104/59 - - 67 14 93 %   10/09/17 0100 107/80 - - 67 22 93 %   10/09/17 0045 105/60 - - 67 20 -   10/09/17 0030 106/59 - - 67 22 -   10/09/17 0015 106/66 - - 100 22 -   10/09/17 0000 105/51 - - 105 18 95 %   10/08/17 2345 110/66 97.8  F (36.6  C) Oral 88 22 -   10/08/17 2330 107/65 - - 88 20 -   10/08/17 2315 108/65 - - 86 18 -   10/08/17 2300 105/74 - - 102 20 93 %   10/08/17 2245 110/70 - - 91 19 (!) 84 %   10/08/17 2230 100/67 - - 84 22 96 %   10/08/17 2215 111/66 - - 98 20 95 %   10/08/17 2200 110/65 - - 82 10 97 %   10/08/17 2145 103/67 - - 99 24 (!) 88 %   10/08/17 2130 92/69 - - 103 19 96 %   10/08/17 2115 100/68 - - 116 22 -   10/08/17 2100 91/66 - - - - 95 %   10/08/17 2045 104/65 - - - - 97 %   10/08/17 2038 - - - - - 98 %   10/08/17 2030 (!) 85/77 - - - - 91 %   10/08/17 2015 105/61 - - - - (!) 86 %   10/08/17 2000 111/67 - - 81 22 93 %   10/08/17 1945 118/71 98.7  F (37.1  C) Oral 92 17 94 %   10/08/17 1930 114/86 - - 116 24 (!) 89 %   10/08/17 1915 118/71 - - 65 17 95 %   10/08/17 1900 124/74 - - 108 21 94 %   10/08/17 1830 107/80 - - 98 17 90 %   10/08/17 1800 116/68 - - 91 11 96 %   10/08/17 1730 117/75 - - 93 8 96 %   10/08/17 1700 111/73 - - 75 13 94 %   10/08/17 1630 99/65 - - 81 10 97 %   10/08/17 1600 116/81 97.6  F (36.4  C) Oral 105 14 96 %   10/08/17 1530 118/82 - - 91 20 95 %   10/08/17 1500 117/75 - - 86 19 100 %   10/08/17 1430 121/67 - - 104 8 100 %   10/08/17 1400 117/70 - - 99 23 100 %    10/08/17 1330 104/73 - - 115 - 100 %   10/08/17 1300 109/70 - - 111 17 100 %   10/08/17 1230 104/69 - - 101 19 98 %   10/08/17 1200 97/70 98.1  F (36.7  C) Oral 89 19 95 %   10/08/17 1130 97/59 - - 96 17 95 %   10/08/17 1100 95/78 - - 81 11 93 %   10/08/17 1030 108/66 - - 114 9 (!) 81 %   10/08/17 1000 123/77 - - 101 17 92 %       Temp:  [97.2  F (36.2  C)-98.7  F (37.1  C)] 97.3  F (36.3  C)  Heart Rate:  [] 64  Resp:  [8-29] 17  BP: ()/(47-86) 108/61  FiO2 (%):  [60 %-90 %] 60 %  SpO2:  [81 %-100 %] 92 %    Temperatures:  Current - Temp: 97.3  F (36.3  C); Max - Temp  Av.7  F (36.5  C)  Min: 97.2  F (36.2  C)  Max: 98.7  F (37.1  C)  Respiration range: Resp  Av.9  Min: 8  Max: 29  Pulse range: No Data Recorded  Blood pressure range: Systolic (24hrs), Av , Min:85 , Max:124   ; Diastolic (24hrs), Av, Min:47, Max:86    Pulse oximetry range: SpO2  Av.3 %  Min: 81 %  Max: 100 %    I/O last 3 completed shifts:  In: 1920.83 [P.O.:640; I.V.:1280.83]  Out: 1105 [Urine:1105]      Intake/Output Summary (Last 24 hours) at 10/09/17 0951  Last data filed at 10/09/17 0900   Gross per 24 hour   Intake          1960.83 ml   Output             1230 ml   Net           730.83 ml       Alert, on NC O2  Lungs with diminished BS in l base, few basilar rales, no resp distress  Cor irreg, nl S1 S2 no M  LE stasis changes with scaley and ulcerated skin    Wt down 11 kg since adm  I/O 1740/1075    Weights (last 365 days)      Date/Time Weight Who     10/08/17 0630 68.8 kg (151 lb 10.8 oz) KS     10/07/17 0743 70.5 kg (155 lb 6.8 oz) VK     10/07/17 0430 70.5 kg (155 lb 6.8 oz) JS     10/06/17 0000 71.8 kg (158 lb 4.6 oz) IO     10/05/17 1346 76.7 kg (169 lb 1.5 oz) KK     10/05/17 0000 76.7 kg (169 lb 1.5 oz) IO     10/04/17 0200 80.6 kg (177 lb 11.1 oz) AJ     10/03/17 1145 80.4 kg (177 lb 4 oz) JL     10/03/17 0400 80.4 kg (177 lb 4 oz) KE     10/02/17 0400 80 kg (176 lb 5.9 oz)           Wt Readings  from Last 4 Encounters:   10/08/17 68.8 kg (151 lb 10.8 oz)   10/01/17 77.3 kg (170 lb 8 oz)   09/22/17 77.1 kg (170 lb)   09/21/17 76.2 kg (168 lb)          Data:          Lab Results   Component Value Date     10/09/2017     10/08/2017     10/07/2017    Lab Results   Component Value Date    CHLORIDE 107 10/09/2017    CHLORIDE 108 10/08/2017    CHLORIDE 107 10/07/2017    Lab Results   Component Value Date    BUN 29 10/09/2017    BUN 24 10/08/2017    BUN 21 10/07/2017      Lab Results   Component Value Date    POTASSIUM 3.0 10/09/2017    POTASSIUM 3.4 10/08/2017    POTASSIUM 3.6 10/07/2017    Lab Results   Component Value Date    CO2 31 10/09/2017    CO2 29 10/08/2017    CO2 27 10/07/2017    Lab Results   Component Value Date    CR 3.98 10/09/2017    CR 3.28 10/08/2017    CR 2.93 10/07/2017        Recent Labs   Lab Test  10/09/17   0441  10/08/17   1230  10/07/17   0442   WBC  7.9  8.3  7.2   HGB  6.9*  7.2*  7.6*   HCT  22.0*  23.0*  24.0*   MCV  88  88  86   PLT  130*  134*  144*     Recent Labs   Lab Test  10/08/17   0411  10/01/17   2145  08/27/17   0836  08/26/17   0637   AST  12  16   --   34   ALT  10  16   --   80*   ALKPHOS  152*  197*   --   292*   BILITOTAL  0.4  0.3  0.2  0.3       Recent Labs   Lab Test  10/09/17   0441  10/08/17   0411  10/07/17   0855   MAG  2.1  2.0  1.6     Recent Labs   Lab Test  10/09/17   0441  10/08/17   0411  10/07/17   1825   PHOS  3.4  3.1  2.7     Recent Labs   Lab Test  10/09/17   0441  10/08/17   0411  10/07/17   1825   MALICK  7.6*  7.7*  7.7*       Lab Results   Component Value Date    MALICK 7.6 (L) 10/09/2017     Lab Results   Component Value Date    WBC 7.9 10/09/2017    HGB 6.9 (LL) 10/09/2017    HCT 22.0 (L) 10/09/2017    MCV 88 10/09/2017     (L) 10/09/2017     Lab Results   Component Value Date     (H) 10/09/2017    POTASSIUM 3.0 (L) 10/09/2017    CHLORIDE 107 10/09/2017    CO2 31 10/09/2017     (H) 10/09/2017     Lab Results    Component Value Date    BUN 29 10/09/2017    CR 3.98 (H) 10/09/2017     Lab Results   Component Value Date    MAG 2.1 10/09/2017     Lab Results   Component Value Date    PHOS 3.4 10/09/2017       Creatinine   Date Value Ref Range Status   10/09/2017 3.98 (H) 0.52 - 1.04 mg/dL Final   10/08/2017 3.28 (H) 0.52 - 1.04 mg/dL Final   10/07/2017 2.93 (H) 0.52 - 1.04 mg/dL Final   10/07/2017 3.51 (H) 0.52 - 1.04 mg/dL Final   10/06/2017 2.98 (H) 0.52 - 1.04 mg/dL Final   10/05/2017 2.90 (H) 0.52 - 1.04 mg/dL Final       Attestation:  I have reviewed today's vital signs, notes, medications, labs and imaging.     Hank Guerra MD

## 2017-10-09 NOTE — PROGRESS NOTES
[facility]    ICU Progress Note    Assessment & Plan   Kathryn Banks is a 77 year old female who was admitted on 10/2/2017 from an outside hospital for evaluation and management of sepsis likely secondary to pneumonia. Upon arrival she was requiring vasopressor support, which has been weaned off. Intermittent hemodialysis was initiated on 10/2/2017 out of concern for acute on chronic renal failure, hyperkalemia, and metabolic acidosis. Her oxygenation has improved and she is currently on HF nasal cannula.     Active Problems:  Acute respiratory failure, improving  Obstructive sleep apnea, treated  Pneumonia, treated  Pleural effusion, left  HFpEF  Atrial fibrillation, paroxysmal   End-stage renal disease, now requiring dialysis     Neurologic:  #1 Altered mental status, likely multifactorial encephalopathy versus delirium, resolving  Initially with altered mental status and has improved. Alert, oriented, very hard of hearing.   - encourage sleep/wake cycle  - avoid medications known to cause delirium; Tylenol as needed for pain, avoid opiates  - redirect, as needed     Pulmonary:  #2 Acute respiratory failure, likely secondary to pneumonia versus pulmonary edema secondary to decompensated heart failure, improved   #3 Obstructive sleep apnea, treated  Has required BiPap intermittently, now on HF nasal cannula. Was on flat-bedrest for several days due to femoral line and has had atelectasis on chest Xray. Chest Xray today with persistent but slightly improved bilateral alveolar pulmonary infiltrates and slightly worse moderate left pleural effusion.   - continue HF nasal cannula  - encourage BiPap during sleep as she has as history of ANABEL  - highly encourage pulmonary hygiene, up to chair as tolerated  - scheduled and PRN nebs    #4 Pneumonia, treated  Chest Xray at outside facility was notable for concern of pneumonia. Currently afebrile, white count is normal, Procalcitonin on 10/7/2017 0.75. Was  initially started on empiric IV Vancomycin and Zosyn, now on Meropenem.   - continue meropenem    #5 Pleural effusion, left  Chest Xray today is notable for slight worsening of left pleural effusion. Ultrasound guided thoracentesis has been considered this admission.  - consider thoracentesis  - re-eval CXR post dialysis and ongoing diuresis     Cardiovascular:  #6 Heart failure, with preserved ejection fraction, secondary to ischemic heart disease  Echocardiogram 10/2/2017 with unchanged EF of 60-65%, pulmonary hypertension, diastolic dysfunction, and mild-moderately reduced RV function. On admission Chest Xray was concerning for pulmonary edema; peripheral edema was present on admission suggesting decompensated heart failure, which could have contributed to her respiratory status. Cardiology has been consulted with recommendation to consider ischemic work-up as an outpatient.   - continue Metoprolol, amlodipine, and Bumex    #7 Atrial fibrillation, paroxysmal  #8 Status post ablation for atrial fibrillation, June 2017  She has intermittently been in atrial fibrillation since 10/4/2017; currently in sinus rhythm. Many rhythm changes have been with dialysis. She required diltiazem drip for rate control on 10/4/2017. Now on oral metoprolol.  - replete electrolytes, as needed  - metoprolol for rate control, consider diltiazem if RVR  - takes warfarin for stroke prevention, will need to consider when to resume as she is at risk for cardioembolic event (CHADS-VASc2 4)    Renal:  #9 End-stage renal disease, now on dialysis  She was not requiring dialysis prior to this admission, likely now requiring dialysis secondary to hypovolemia and hypotension present on admission. Intermittent hemodialysis initiated 10/2/2017, RIJ tunneled line placed 10/6/2017. Greatly appreciate Nephrology recommendations.   - dialysis per Nephrology     #10 Oliguria  Urine output has improved with Bumex. UOP 10/8/2017 1065. Cee in-place.  Encouraging she is making urine   - continue Bumex 2- mg every 8- hours    #11 Hypokalemia, treated  #12 Hypomagnesemia, treated  - replete as needed    Infectious Disease   ## Pneumonia, treated  #13 Personal history of clostridium difficile, treated  Chest Xray on 10/5/2017 was concerning for worsening respiratory status and Zosyn was switched to Meropenem. She was diagnosed with C. Dif at [facility] on 9/14/2017 and initially treated with PO metronidazole without response and transitioned to PO Vancomycin on 9/22/2017. The 14- days course of Vancomycin was complete on 10/6/2017, negative PCR this admission.   - still have Cee and need to continue for accurate I/O with renal failure  - recheck procal   - stop Meropenem tomorrow 8/8 day course      GI/Nutrition  #14 Altered nutrition, secondary to critical illness  - continue dialysis diet with Nepro supplement     Endocrine  #15 Hyperglycemia, secondary to critical illness  Does not have a history of diabetes.   - finger stick every 4- hours with correction scale to keep glucose less than 150    Musculoskeletal  #16 Bilateral lower extremity wounds, likely venous  She states she had a full work-up at [facility]. Wounds have been present for 2- months and she states they are improving.   - continue local wound care    ICU Prophylaxis:  DVT: SQ Heparin  Restaints: No     Code Status: Full Code    Disposition: Expected discharge- pending respiratory improvement     Emy Armas, RHODA, CNP scribed for Dr Portillo    Interval History   No acute events overnight. Will dialyze today.     -Data reviewed today: I reviewed all new labs and imaging results over the last 24 hours. I personally reviewed no images or EKG's today.    Physical Exam   Temp: 97.3  F (36.3  C) Temp src: Oral BP: 108/61   Heart Rate: 64 Resp: 17 SpO2: 92 % O2 Device: High Flow Nasal Cannula (HFNC) Oxygen Delivery: Other (Comments) (40lpm)  Vitals:    10/07/17 0430 10/07/17 0743 10/08/17 0630   Weight:  70.5 kg (155 lb 6.8 oz) 70.5 kg (155 lb 6.8 oz) 68.8 kg (151 lb 10.8 oz)     Vital Signs with Ranges  Temp:  [97.2  F (36.2  C)-98.7  F (37.1  C)] 97.3  F (36.3  C)  Heart Rate:  [] 64  Resp:  [8-29] 17  BP: ()/(47-86) 108/61  FiO2 (%):  [60 %-90 %] 60 %  SpO2:  [81 %-100 %] 92 %  I/O last 3 completed shifts:  In: 1920.83 [P.O.:640; I.V.:1280.83]  Out: 1105 [Urine:1105]    General: Appears stated age, no acute distress.  Skin:  Warm, dry. No rashes or lesions on exposed skin.  HEENT:  Normocephalic, atraumatic.  Chest:  Bilateral anterior lung fields coarse. No increased work of breathing. Does require supplemental oxygen via HF nasal cannula.  Cardiovascular:  Regular rate and rhythm, without murmur, rub, or gallop. Bilateral upper and lower extremities warm and appear well-perfused.  Abdomen:  Soft, non-tender, non-distended. Bowel sounds present.   Musculoskeletal:  Moves all four extremities. Bilateral lower extremity wounds appear non-infected.  Neurological:  Alert and oriented x 4. Cranial nerves II-XII grossly intact.   Psychiatric:  Affect and mood congruent.    Medications     diltiazem (CARDIZEM) infusion ADULT 5 mg/hr (10/09/17 0205)     dextrose 5% and 0.45% NaCl 20 mL/hr at 10/08/17 1800     IV fluid REPLACEMENT ONLY         metoprolol  50 mg Oral BID     albumin human  50 g Intravenous Once     insulin aspart  1-7 Units Subcutaneous Q4H     ipratropium - albuterol 0.5 mg/2.5 mg/3 mL  1 vial Nebulization TID     pentoxifylline  400 mg Oral Daily     sodium chloride (PF)  10 mL Intracatheter Q8H     meropenem  500 mg Intravenous Q24H     bumetanide  2 mg Intravenous Q8H     cholecalciferol  5,000 Units Oral Daily     calcitRIOL  0.25 mcg Oral Daily     heparin  5,000 Units Subcutaneous Q8H     - MEDICATION INSTRUCTIONS for Dialysis Patients -   Does not apply See Admin Instructions       Data     Recent Labs  Lab 10/09/17  0441 10/08/17  1230 10/08/17  0411 10/07/17  1825  10/07/17  0442   10/06/17  0930  10/04/17  0440  10/03/17  0425 10/02/17  2100   WBC 7.9 8.3  --   --   --  7.2  < >  --   < > 9.0  --  10.8  --    HGB 6.9* 7.2*  --   --   --  7.6*  < >  --   < > 7.1*  < > 6.6*  --    MCV 88 88  --   --   --  86  < >  --   < > 82  --  81  --    * 134*  --   --   --  144*  < >  --   < > 182  --  219  --    INR  --   --   --   --   --   --   --  0.95  --   --   --  1.82* 2.37*   *  --  145* 144  --  147*  < >  --   < > 144  --  144 146*   POTASSIUM 3.0*  --  3.4 3.6  < > 3.2*  < > Canceled, Test credited  < > 3.2*  --  3.9 5.1   CHLORIDE 107  --  108 107  --  109  < >  --   < > 109  --  112* 119*   CO2 31  --  29 27  --  31  < >  --   < > 23  --  21 14*   BUN 29  --  24 21  --  23  < >  --   < > 26  --  37* 61*   CR 3.98*  --  3.28* 2.93*  --  3.51*  < >  --   < > 3.19*  --  3.78* 5.24*   ANIONGAP 7  --   --  10  --  7  < >  --   < > 12  --  11 13   MALICK 7.6*  --  7.7* 7.7*  --  7.6*  < >  --   < > 6.6*  --  6.6* 6.5*   *  --  137* 211*  --  146*  < >  --   < > 157*  --  141* 163*   ALBUMIN  --   --  2.2* 2.1*  --   --   --   --   --   --   --   --   --    PROTTOTAL  --   --  5.1*  --   --   --   --   --   --  4.8*  --   --   --    BILITOTAL  --   --  0.4  --   --   --   --   --   --   --   --   --   --    ALKPHOS  --   --  152*  --   --   --   --   --   --   --   --   --   --    ALT  --   --  10  --   --   --   --   --   --   --   --   --   --    AST  --   --  12  --   --   --   --   --   --   --   --   --   --    < > = values in this interval not displayed.    Imaging:   Recent Results (from the past 24 hour(s))   XR Chest Port 1 View    Narrative    CHEST ONE VIEW PORTABLE   10/9/2017 6:10 AM     HISTORY:  Follow up infiltrates.    COMPARISON: 10/7/2017      Impression    IMPRESSION: Persistent bilateral alveolar pulmonary infiltrates,  slightly improved. Moderate left pleural effusion is slightly worse.  Normal heart size. Sternal wires. Catheter from right subclavian  approach  with the tip in the upper right atrium. PICC line from the  right with the tip at the cavoatrial junction.    STEPHANIE DOMINGUEZ MD     35 min CCT spent

## 2017-10-09 NOTE — PHARMACY-ANTICOAGULATION SERVICE
Clinical Pharmacy - Warfarin Dosing Consult     Pharmacy has been consulted to manage this patient s warfarin therapy.  Indication: Atrial Fibrillation  Therapy Goal: INR 2-3  Warfarin PTA Regimen: 2mg daily historical dose    INR   Date Value Ref Range Status   10/06/2017 0.95 0.86 - 1.14 Final   10/03/2017 1.82 (H) 0.86 - 1.14 Final       Recommend warfarin 2 mg today.  Pharmacy will monitor Kathryn Banks daily and order warfarin doses to achieve specified goal.      Please contact pharmacy as soon as possible if the warfarin needs to be held for a procedure or if the warfarin goals change.

## 2017-10-09 NOTE — PROGRESS NOTES
Potassium   Date Value Ref Range Status   10/09/2017 3.0 (L) 3.4 - 5.3 mmol/L Final   ]  Lab Results   Component Value Date    HGB 6.9 10/09/2017     Weight: 68.8 kg (151 lb 10.8 oz)  POST WT 65.8kg  DIALYSIS PROCEDURE NOTE    Patient dialyzed for 3 hrs on a 4 K bath Bicarb at 33 with a  net fluid removal of 3L.  A BFR of 400ml/min was obtained via RIJ.  Patient was seen by  during treatment.  Total heparin received during treatment:: 1500units.  Heparin instilled in both ports post run.  Meds given:EPO 8,000units IV and Hectoral 4mcg IV and Venofer 100mg IV Complications:NONE   Procedure and ESRD teaching done and questions answered.  See flowsheet in EPIC for further details and post assessment.  Machine water alarm in place and functioning.  HEPATITIS B SURFACE ANTIGEN neg Date 10/6/17  HEPATITIS B SURFACE ANTIBODY Immune DATE 10/6/17  CHLORINE AND CHLORAMINES CHECK on WATER SYSTEM EVERY 4 hours.   PT dialyzed in 370 ICU on portable H2O machine  Catheter Access: Aseptic prep done for both on/off.  Report received from:Ren EpsteinReport given to:Cordell Dallas R.n.  Outpatient Dialysis at  [facility]

## 2017-10-09 NOTE — PROGRESS NOTES
Care Transition Initial Assessment - RN        Met with: Patient.    DATA   Active Problems:    Septic shock (H)       Cognitive Status: alert and oriented.        Contact information and PCP information verified: Yes    Lives With: spouse, grandchild(zoe), other (see comments) (2 friends)        Insurance concerns: No Insurance issues identified    ASSESSMENT  Patient currently receives the following services:  High Flow O2, [facility]        Identified issues/concerns regarding health management: New to Dialysis      PLAN  Financial costs for the patient include none .  Patient given options and choices for discharge yes .  Patient/family is agreeable to the plan?  Yes:   Patient anticipates discharging to LTACH .        Patient anticipates needs for home equipment: Yes  Discharge Planner   Discharge Plans in progress: yes  Barriers to discharge plan: none  Plan/Disposition: LTACH   Appointments:         Care  (CTS) will continue to follow as needed.

## 2017-10-10 ENCOUNTER — APPOINTMENT (OUTPATIENT)
Dept: CT IMAGING | Facility: CLINIC | Age: 77
DRG: 871 | End: 2017-10-10
Attending: INTERNAL MEDICINE
Payer: MEDICARE

## 2017-10-10 LAB
ALBUMIN SERPL-MCNC: 2 G/DL (ref 3.4–5)
ALP SERPL-CCNC: 147 U/L (ref 40–150)
ALT SERPL W P-5'-P-CCNC: 9 U/L (ref 0–50)
ANION GAP SERPL CALCULATED.3IONS-SCNC: 5 MMOL/L (ref 3–14)
AST SERPL W P-5'-P-CCNC: 12 U/L (ref 0–45)
BASOPHILS # BLD AUTO: 0 10E9/L (ref 0–0.2)
BASOPHILS NFR BLD AUTO: 0.2 %
BILIRUB SERPL-MCNC: 0.3 MG/DL (ref 0.2–1.3)
BUN SERPL-MCNC: 12 MG/DL (ref 7–30)
CALCIUM SERPL-MCNC: 7.7 MG/DL (ref 8.5–10.1)
CHLORIDE SERPL-SCNC: 109 MMOL/L (ref 94–109)
CO2 SERPL-SCNC: 29 MMOL/L (ref 20–32)
CREAT SERPL-MCNC: 2.8 MG/DL (ref 0.52–1.04)
DIFFERENTIAL METHOD BLD: ABNORMAL
EOSINOPHIL # BLD AUTO: 0.5 10E9/L (ref 0–0.7)
EOSINOPHIL NFR BLD AUTO: 5.2 %
ERYTHROCYTE [DISTWIDTH] IN BLOOD BY AUTOMATED COUNT: 18.4 % (ref 10–15)
GFR SERPL CREATININE-BSD FRML MDRD: 16 ML/MIN/1.7M2
GLUCOSE BLDC GLUCOMTR-MCNC: 140 MG/DL (ref 70–99)
GLUCOSE BLDC GLUCOMTR-MCNC: 153 MG/DL (ref 70–99)
GLUCOSE BLDC GLUCOMTR-MCNC: 157 MG/DL (ref 70–99)
GLUCOSE BLDC GLUCOMTR-MCNC: 259 MG/DL (ref 70–99)
GLUCOSE BLDC GLUCOMTR-MCNC: 267 MG/DL (ref 70–99)
GLUCOSE SERPL-MCNC: 147 MG/DL (ref 70–99)
HCT VFR BLD AUTO: 27.5 % (ref 35–47)
HGB BLD-MCNC: 8.5 G/DL (ref 11.7–15.7)
IMM GRANULOCYTES # BLD: 0.5 10E9/L (ref 0–0.4)
IMM GRANULOCYTES NFR BLD: 5 %
INR PPP: 0.98 (ref 0.86–1.14)
LYMPHOCYTES # BLD AUTO: 0.9 10E9/L (ref 0.8–5.3)
LYMPHOCYTES NFR BLD AUTO: 9.9 %
MAGNESIUM SERPL-MCNC: 1.7 MG/DL (ref 1.6–2.3)
MCH RBC QN AUTO: 27.7 PG (ref 26.5–33)
MCHC RBC AUTO-ENTMCNC: 30.9 G/DL (ref 31.5–36.5)
MCV RBC AUTO: 90 FL (ref 78–100)
MONOCYTES # BLD AUTO: 0.6 10E9/L (ref 0–1.3)
MONOCYTES NFR BLD AUTO: 6.2 %
NEUTROPHILS # BLD AUTO: 6.9 10E9/L (ref 1.6–8.3)
NEUTROPHILS NFR BLD AUTO: 73.5 %
NRBC # BLD AUTO: 0 10*3/UL
NRBC BLD AUTO-RTO: 0 /100
PHOSPHATE SERPL-MCNC: 2 MG/DL (ref 2.5–4.5)
PLATELET # BLD AUTO: 129 10E9/L (ref 150–450)
POTASSIUM SERPL-SCNC: 3.6 MMOL/L (ref 3.4–5.3)
PROCALCITONIN SERPL-MCNC: 4.32 NG/ML
PROT SERPL-MCNC: 5 G/DL (ref 6.8–8.8)
RBC # BLD AUTO: 3.07 10E12/L (ref 3.8–5.2)
SODIUM SERPL-SCNC: 143 MMOL/L (ref 133–144)
WBC # BLD AUTO: 9.3 10E9/L (ref 4–11)

## 2017-10-10 PROCEDURE — 94640 AIRWAY INHALATION TREATMENT: CPT

## 2017-10-10 PROCEDURE — 85025 COMPLETE CBC W/AUTO DIFF WBC: CPT | Performed by: INTERNAL MEDICINE

## 2017-10-10 PROCEDURE — 84100 ASSAY OF PHOSPHORUS: CPT | Performed by: INTERNAL MEDICINE

## 2017-10-10 PROCEDURE — 83735 ASSAY OF MAGNESIUM: CPT | Performed by: INTERNAL MEDICINE

## 2017-10-10 PROCEDURE — 25000132 ZZH RX MED GY IP 250 OP 250 PS 637: Mod: GY | Performed by: INTERNAL MEDICINE

## 2017-10-10 PROCEDURE — 20000003 ZZH R&B ICU

## 2017-10-10 PROCEDURE — A9270 NON-COVERED ITEM OR SERVICE: HCPCS | Mod: GY | Performed by: INTERNAL MEDICINE

## 2017-10-10 PROCEDURE — 99291 CRITICAL CARE FIRST HOUR: CPT | Performed by: INTERNAL MEDICINE

## 2017-10-10 PROCEDURE — 25000125 ZZHC RX 250: Performed by: SURGERY

## 2017-10-10 PROCEDURE — 25000132 ZZH RX MED GY IP 250 OP 250 PS 637: Mod: GY | Performed by: SURGERY

## 2017-10-10 PROCEDURE — 71250 CT THORAX DX C-: CPT

## 2017-10-10 PROCEDURE — 84145 PROCALCITONIN (PCT): CPT | Performed by: INTERNAL MEDICINE

## 2017-10-10 PROCEDURE — 25000128 H RX IP 250 OP 636: Performed by: SURGERY

## 2017-10-10 PROCEDURE — 25000131 ZZH RX MED GY IP 250 OP 636 PS 637: Mod: GY | Performed by: NURSE PRACTITIONER

## 2017-10-10 PROCEDURE — 94640 AIRWAY INHALATION TREATMENT: CPT | Mod: 76

## 2017-10-10 PROCEDURE — 85610 PROTHROMBIN TIME: CPT | Performed by: INTERNAL MEDICINE

## 2017-10-10 PROCEDURE — 80053 COMPREHEN METABOLIC PANEL: CPT | Performed by: INTERNAL MEDICINE

## 2017-10-10 PROCEDURE — 40000239 ZZH STATISTIC VAT ROUNDS

## 2017-10-10 PROCEDURE — S0171 BUMETANIDE 0.5 MG: HCPCS | Performed by: INTERNAL MEDICINE

## 2017-10-10 PROCEDURE — A9270 NON-COVERED ITEM OR SERVICE: HCPCS | Mod: GY | Performed by: SURGERY

## 2017-10-10 PROCEDURE — S0169 CALCITROL: HCPCS | Mod: GY | Performed by: INTERNAL MEDICINE

## 2017-10-10 PROCEDURE — 25000132 ZZH RX MED GY IP 250 OP 250 PS 637: Mod: GY | Performed by: NURSE PRACTITIONER

## 2017-10-10 PROCEDURE — 25000125 ZZHC RX 250: Performed by: INTERNAL MEDICINE

## 2017-10-10 PROCEDURE — 40000275 ZZH STATISTIC RCP TIME EA 10 MIN

## 2017-10-10 PROCEDURE — A9270 NON-COVERED ITEM OR SERVICE: HCPCS | Mod: GY | Performed by: NURSE PRACTITIONER

## 2017-10-10 PROCEDURE — 00000146 ZZHCL STATISTIC GLUCOSE BY METER IP

## 2017-10-10 RX ORDER — WARFARIN SODIUM 2 MG/1
2 TABLET ORAL
Status: DISCONTINUED | OUTPATIENT
Start: 2017-10-10 | End: 2017-10-10

## 2017-10-10 RX ADMIN — IPRATROPIUM BROMIDE AND ALBUTEROL SULFATE 3 ML: .5; 3 SOLUTION RESPIRATORY (INHALATION) at 12:38

## 2017-10-10 RX ADMIN — HEPARIN SODIUM 5000 UNITS: 5000 INJECTION, SOLUTION INTRAVENOUS; SUBCUTANEOUS at 18:00

## 2017-10-10 RX ADMIN — CALCITRIOL 0.25 MCG: 0.25 CAPSULE, LIQUID FILLED ORAL at 08:37

## 2017-10-10 RX ADMIN — PENTOXIFYLLINE 400 MG: 400 TABLET, FILM COATED, EXTENDED RELEASE ORAL at 08:37

## 2017-10-10 RX ADMIN — BUMETANIDE 2 MG: 0.25 INJECTION INTRAMUSCULAR; INTRAVENOUS at 08:38

## 2017-10-10 RX ADMIN — IPRATROPIUM BROMIDE AND ALBUTEROL SULFATE 3 ML: .5; 3 SOLUTION RESPIRATORY (INHALATION) at 20:26

## 2017-10-10 RX ADMIN — METOPROLOL TARTRATE 50 MG: 50 TABLET ORAL at 08:37

## 2017-10-10 RX ADMIN — IPRATROPIUM BROMIDE AND ALBUTEROL SULFATE 3 ML: .5; 3 SOLUTION RESPIRATORY (INHALATION) at 07:26

## 2017-10-10 RX ADMIN — POTASSIUM PHOSPHATE, MONOBASIC AND POTASSIUM PHOSPHATE, DIBASIC 15 MMOL: 224; 236 INJECTION, SOLUTION INTRAVENOUS at 06:59

## 2017-10-10 RX ADMIN — HEPARIN SODIUM 5000 UNITS: 5000 INJECTION, SOLUTION INTRAVENOUS; SUBCUTANEOUS at 00:12

## 2017-10-10 RX ADMIN — BUMETANIDE 2 MG: 0.25 INJECTION INTRAMUSCULAR; INTRAVENOUS at 17:59

## 2017-10-10 RX ADMIN — HEPARIN SODIUM 5000 UNITS: 5000 INJECTION, SOLUTION INTRAVENOUS; SUBCUTANEOUS at 08:38

## 2017-10-10 RX ADMIN — BUMETANIDE 2 MG: 0.25 INJECTION INTRAMUSCULAR; INTRAVENOUS at 00:12

## 2017-10-10 RX ADMIN — INSULIN GLARGINE 5 UNITS: 100 INJECTION, SOLUTION SUBCUTANEOUS at 08:37

## 2017-10-10 RX ADMIN — METOPROLOL TARTRATE 50 MG: 50 TABLET ORAL at 20:39

## 2017-10-10 RX ADMIN — CHOLECALCIFEROL CAP 125 MCG (5000 UNIT) 5000 UNITS: 125 CAP at 08:37

## 2017-10-10 RX ADMIN — PANTOPRAZOLE SODIUM 40 MG: 40 TABLET, DELAYED RELEASE ORAL at 08:37

## 2017-10-10 NOTE — PROGRESS NOTES
Assessment and Plan:   ESRD: HD yesterday w/ 3 L UF. Using R CVC. She remains on IV bumex with about 1 L of UO. Getting calcitriol and vit D. Lytes ok with slightly low K and elevated HCO3. Mg and phos low.   Replace lytes, HD in am.             Interval History:   Resp failure: Hx of pumonary edema and volume overload. On meropenem.   Anemia  DM  ASCVD: CABG, CHF.   On metoprolol and coumadin.               Review of Systems:   Eating well, no N or V. No SOB.           Medications:       pantoprazole  40 mg Oral QAM     insulin glargine  5 Units Subcutaneous QAM AC     metoprolol  50 mg Oral BID     albumin human  50 g Intravenous Once     insulin aspart  1-7 Units Subcutaneous Q4H     ipratropium - albuterol 0.5 mg/2.5 mg/3 mL  1 vial Nebulization TID     pentoxifylline  400 mg Oral Daily     sodium chloride (PF)  10 mL Intracatheter Q8H     meropenem  500 mg Intravenous Q24H     bumetanide  2 mg Intravenous Q8H     cholecalciferol  5,000 Units Oral Daily     calcitRIOL  0.25 mcg Oral Daily     heparin  5,000 Units Subcutaneous Q8H     - MEDICATION INSTRUCTIONS for Dialysis Patients -   Does not apply See Admin Instructions       Warfarin Therapy Reminder       dextrose 5% and 0.45% NaCl 20 mL/hr at 10/09/17 1714     IV fluid REPLACEMENT ONLY       Current active medications and PTA medications reviewed, see medication list for details.            Physical Exam:   Vitals were reviewed  Patient Vitals for the past 24 hrs:   BP Temp Temp src Heart Rate Resp SpO2 Weight   10/10/17 0900 101/61 - - 89 18 - -   10/10/17 0800 117/64 98.1  F (36.7  C) Oral 83 11 - -   10/10/17 0726 - - - - - 96 % -   10/10/17 0700 134/73 - - 84 17 - -   10/10/17 0600 131/66 98  F (36.7  C) Oral 70 19 94 % 70.8 kg (156 lb 1.4 oz)   10/10/17 0500 121/64 - - 71 17 97 % -   10/10/17 0400 118/65 - - 68 8 94 % -   10/10/17 0300 113/67 - - 69 12 95 % -   10/10/17 0200 120/65 - - 70 19 94 % -   10/10/17 0100 120/64 - - 66 14 92 % -    10/10/17 0000 112/57 - - 67 11 96 % -   10/09/17 2300 132/69 - - 74 13 92 % -   10/09/17 2200 121/55 - - 65 15 92 % -   10/09/17 2100 124/65 - - 65 21 99 % -   10/09/17 2000 130/63 - - 71 15 99 % -   10/09/17 1900 112/64 - - 79 15 95 % -   10/09/17 1800 129/70 - - 74 22 95 % -   10/09/17 1730 130/66 - - 72 15 90 % -   10/09/17 1715 138/70 - - 76 8 97 % -   10/09/17 1700 138/68 97.5  F (36.4  C) Oral 83 18 93 % -   10/09/17 1645 133/63 - - 68 18 98 % -   10/09/17 1615 122/68 - - 75 23 - -   10/09/17 1600 120/60 - - 67 20 95 % -   10/09/17 1530 122/66 97.5  F (36.4  C) Oral 69 (!) 31 - -   10/09/17 1515 115/78 - - 65 14 - -   10/09/17 1500 111/64 - - 62 17 - -   10/09/17 1450 121/82 - - 67 20 - -   10/09/17 1445 121/82 - - 69 12 - -   10/09/17 1430 112/58 - - 69 18 - -   10/09/17 1415 115/74 - - 62 14 - -   10/09/17 1400 128/73 - - 70 27 - -   10/09/17 1345 115/74 - - 62 17 - -   10/09/17 1330 119/64 - - 62 16 - -   10/09/17 1315 115/56 - - 64 (!) 7 - -   10/09/17 1300 116/65 - - 64 16 - -   10/09/17 1245 117/85 - - 64 18 - -   10/09/17 1230 116/65 - - 70 16 94 % -   10/09/17 1217 - - - - - 97 % -   10/09/17 1215 121/66 97.5  F (36.4  C) Oral 71 (!) 7 95 % -   10/09/17 1200 110/60 - - 63 16 96 % -       Temp:  [97.5  F (36.4  C)-98.1  F (36.7  C)] 98.1  F (36.7  C)  Heart Rate:  [62-89] 89  Resp:  [7-31] 18  BP: (101-138)/(55-85) 101/61  FiO2 (%):  [60 %-70 %] 70 %  SpO2:  [90 %-99 %] 96 %    Temperatures:  Current - Temp: 98.1  F (36.7  C); Max - Temp  Av.7  F (36.5  C)  Min: 97.5  F (36.4  C)  Max: 98.1  F (36.7  C)  Respiration range: Resp  Av.2  Min: 7  Max: 31  Pulse range: No Data Recorded  Blood pressure range: Systolic (24hrs), Av , Min:101 , Max:138   ; Diastolic (24hrs), Av, Min:55, Max:85    Pulse oximetry range: SpO2  Av %  Min: 90 %  Max: 99 %    I/O last 3 completed shifts:  In: 536.17 [I.V.:536.17]  Out: 4235 [Urine:1235; Other:3000]      Intake/Output Summary (Last 24 hours)  at 10/10/17 0942  Last data filed at 10/10/17 0800   Gross per 24 hour   Intake           526.17 ml   Output             4110 ml   Net         -3583.83 ml       Alert, deaf, up in chair  Lungs with bibasilar rales, no wheezing  Cor RRR nl S1 S2 No M  LE stasis changes, no edema    I/O 817/4030, UO 1030       Wt Readings from Last 4 Encounters:   10/10/17 70.8 kg (156 lb 1.4 oz)   10/01/17 77.3 kg (170 lb 8 oz)   09/22/17 77.1 kg (170 lb)   09/21/17 76.2 kg (168 lb)          Data:          Lab Results   Component Value Date     10/10/2017     10/09/2017     10/08/2017    Lab Results   Component Value Date    CHLORIDE 109 10/10/2017    CHLORIDE 107 10/09/2017    CHLORIDE 108 10/08/2017    Lab Results   Component Value Date    BUN 12 10/10/2017    BUN 29 10/09/2017    BUN 24 10/08/2017      Lab Results   Component Value Date    POTASSIUM 3.6 10/10/2017    POTASSIUM 3.0 10/09/2017    POTASSIUM 3.4 10/08/2017    Lab Results   Component Value Date    CO2 29 10/10/2017    CO2 31 10/09/2017    CO2 29 10/08/2017    Lab Results   Component Value Date    CR 2.80 10/10/2017    CR 3.98 10/09/2017    CR 3.28 10/08/2017        Recent Labs   Lab Test  10/10/17   0409  10/09/17   0441  10/08/17   1230   WBC  9.3  7.9  8.3   HGB  8.5*  6.9*  7.2*   HCT  27.5*  22.0*  23.0*   MCV  90  88  88   PLT  129*  130*  134*     Recent Labs   Lab Test  10/10/17   0409  10/08/17   0411  10/01/17   2145   AST  12  12  16   ALT  9  10  16   ALKPHOS  147  152*  197*   BILITOTAL  0.3  0.4  0.3       Recent Labs   Lab Test  10/10/17   0409  10/09/17   0441  10/08/17   0411   MAG  1.7  2.1  2.0     Recent Labs   Lab Test  10/10/17   0409  10/09/17   0441  10/08/17   0411   PHOS  2.0*  3.4  3.1     Recent Labs   Lab Test  10/10/17   0409  10/09/17   0441  10/08/17   0411   MALICK  7.7*  7.6*  7.7*       Lab Results   Component Value Date    MALICK 7.7 (L) 10/10/2017     Lab Results   Component Value Date    WBC 9.3 10/10/2017    HGB 8.5 (L)  10/10/2017    HCT 27.5 (L) 10/10/2017    MCV 90 10/10/2017     (L) 10/10/2017     Lab Results   Component Value Date     10/10/2017    POTASSIUM 3.6 10/10/2017    CHLORIDE 109 10/10/2017    CO2 29 10/10/2017     (H) 10/10/2017     Lab Results   Component Value Date    BUN 12 10/10/2017    CR 2.80 (H) 10/10/2017     Lab Results   Component Value Date    MAG 1.7 10/10/2017     Lab Results   Component Value Date    PHOS 2.0 (L) 10/10/2017       Creatinine   Date Value Ref Range Status   10/10/2017 2.80 (H) 0.52 - 1.04 mg/dL Final   10/09/2017 3.98 (H) 0.52 - 1.04 mg/dL Final   10/08/2017 3.28 (H) 0.52 - 1.04 mg/dL Final   10/07/2017 2.93 (H) 0.52 - 1.04 mg/dL Final   10/07/2017 3.51 (H) 0.52 - 1.04 mg/dL Final   10/06/2017 2.98 (H) 0.52 - 1.04 mg/dL Final       Attestation:  I have reviewed today's vital signs, notes, medications, labs and imaging.     Hank Guerra MD

## 2017-10-10 NOTE — PLAN OF CARE
Problem: Patient Care Overview  Goal: Plan of Care/Patient Progress Review  Outcome: No Change  BiPAP overnight, HiFlo in evening and replaced this AM. When O2 removed, desatted to low 60s. Pulm toilet encouraged, productive cough. 125/2hr from harish. Pravin HAYES x4. Large BM in BSC. Continue to monitor.

## 2017-10-10 NOTE — PLAN OF CARE
Problem: Sepsis/Septic Shock (Adult)  Goal: Signs and Symptoms of Listed Potential Problems Will be Absent, Minimized or Managed (Sepsis/Septic Shock)  Signs and symptoms of listed potential problems will be absent, minimized or managed by discharge/transition of care (reference Sepsis/Septic Shock (Adult) CPG).   Outcome: Improving  [facility]   Intensive Care Unit   Nursing Note     Vital signs stable during the day.  PERRL.  Moves all extremities.  Follows commands. Pt is on HFNC. Pt had a large BM on BSC and an incontinent BM after dialysis x 1.  HD took off 3000 ml.  Pt had > 400 yellow clear urine in moreira. Pt is tolerating her diet.   Labs noted. Daughter hopes pt transfers to transitional care, possibly [facility] tomorrow. Continue to monitor closely.        ROUTINE IP LABS (Last four results)  BMP  Recent Labs  Lab 10/09/17  0441 10/08/17  0411 10/07/17  1825 10/07/17  0855 10/07/17  0442   * 145* 144  --  147*   POTASSIUM 3.0* 3.4 3.6 3.6 3.2*   CHLORIDE 107 108 107  --  109   MALICK 7.6* 7.7* 7.7*  --  7.6*   CO2 31 29 27  --  31   BUN 29 24 21  --  23   CR 3.98* 3.28* 2.93*  --  3.51*   * 137* 211*  --  146*     CBC  Recent Labs  Lab 10/09/17  0441 10/08/17  1230 10/07/17  0442 10/06/17  1330   WBC 7.9 8.3 7.2 8.2   RBC 2.49* 2.62* 2.79* 2.83*   HGB 6.9* 7.2* 7.6* 7.7*   HCT 22.0* 23.0* 24.0* 24.4*   MCV 88 88 86 86   MCH 27.7 27.5 27.2 27.2   MCHC 31.4* 31.3* 31.7 31.6   RDW 18.1* 18.0* 17.9* 18.2*   * 134* 144* 159     INR  Recent Labs  Lab 10/06/17  0930 10/03/17  0425 10/02/17  2100   INR 0.95 1.82* 2.37*        Cordell Dallas RN

## 2017-10-10 NOTE — PROGRESS NOTES
Pt remained on HFNC, and able to titrate to 20 PM, 70%. Spo2 98%. BBS clear bilateral. Will titrate O2 as tolerated.10/10/2017  Yamil Gama

## 2017-10-10 NOTE — PROGRESS NOTES
"CLINICAL NUTRITION SERVICES  -  ASSESSMENT NOTE      Recommendations Ordered by Registered Dietitian (RD): Discontinue the Nepro as patient not taking    Malnutrition: Patient does not meet two of the above criteria necessary for diagnosing malnutrition        REASON FOR ASSESSMENT  Kathryn Banks is a 77 year old female seen by Registered Dietitian for Admission Nutrition Risk Screen follow-up      NUTRITION HISTORY  - History was obtained 10/4 as follows ~  Limited nutritional history as patient was on bipap this late pm.  Information mostly obtained from [facility] and daughter Jennifer.  Patient has recently been visited by dietitians:  6/14/17 - DB diet education completed  7/10/17 - Renal, heart heathy diet education completed  7/11/17 - K and Phos containing foods reviewed further  8/14/17 - Discussed dialysis diet (higher protein) in light of plan for future HD and pt with wounds.     Pt tells me she followed the renal diet \"so so.\"  She was eating fairly well up until Monday.  She is not familiar with oral liquid nutritional supplements but is willing to try one now.  She thinks she has maintained her nutrition and muscle mass fairly well in light of aging and her health.  Note recent diagnosis of C-diff colitis.      CURRENT NUTRITION ORDERS  Diet Order:     Moderate Consistent Carbohydrate + Dialysis   Also receiving 4oz Nepro with meals     Current Intake/Tolerance:  Visited with patient today - she was quite hard of hearing but was able to tell me that she is eating 100% of meals and appetite is good.  Confirmed with RN - he states that patient is ordering 3 meals per day and eating 100% of meals.  She had [place] toast, omelet, blueberry muffin, and tea for breakfast and stir nolan, peaches, and popsicle for lunch.    She did tell me that she doesn't like the Nepro and isn't taking it.    Patient was day #5 inadequate intake until 10/6  Over the last few days, intake has been improving (fair to good) and now " "much improved today to % of meals       PHYSICAL FINDINGS  Observed  No nutrition-related physical findings observed  Obtained from Chart/Interdisciplinary Team  Edema 1+ trace    ANTHROPOMETRICS  Height: 5'4\"  Current Weight: 70.8 kg (156#)(10/10)  Admit Weight:  80 kg (176)(10/2)  Body mass index is 26.79 kg/(m^2)  Weight Status:  Overweight BMI 25-29.9  IBW: 54.5 kg   % IBW: 130%  Weight History:   Wt Readings from Last 10 Encounters:   10/10/17 70.8 kg (156 lb 1.4 oz)   10/01/17 77.3 kg (170 lb 8 oz)   09/22/17 77.1 kg (170 lb)   09/21/17 76.2 kg (168 lb)   09/19/17 76.5 kg (168 lb 11.2 oz)   09/15/17 76.1 kg (167 lb 11.2 oz)   09/13/17 78.7 kg (173 lb 6.4 oz)   09/11/17 78.4 kg (172 lb 12.8 oz)   09/08/17 75.4 kg (166 lb 4.8 oz)   09/06/17 75 kg (165 lb 6.4 oz)   Weight down since admit due to Propofol       LABS  Labs reviewed    MEDICATIONS  Medications reviewed    Dosing Weight 58.6 kg     ASSESSED NUTRITION NEEDS PER APPROVED PRACTICE GUIDELINES:  Estimated Energy Needs: 7050-8432 kcals (25-30 Kcal/Kg)  Justification: overweight  Estimated Protein Needs: 70-90 grams protein (1.2-1.5 g pro/Kg)  Justification: hypercatabolism with acute illness  Estimated Fluid Needs: 6579-7447 mL (1 mL/Kcal)  Justification: maintenance    MALNUTRITION:  % Weight Loss:  Weight loss does not meet criteria for malnutrition (fluid related)  % Intake:  <75% for > 7 days (non-severe malnutrition)  Subcutaneous Fat Loss:  None observed  Muscle Loss:  None observed  Fluid Retention:  Mild 1+ generalized (unsure if this is nutritionally related)    Malnutrition Diagnosis: Patient does not meet two of the above criteria necessary for diagnosing malnutrition    NUTRITION DIAGNOSIS:  Inadequate oral intake related to improving appetite and intake as evidenced by gradually improvement in intake over the last week       NUTRITION INTERVENTIONS  Recommendations / Nutrition Prescription  Continue Moderate CHO, dialysis diet as " tolerated  Discontinue the Nepro as patient not taking     Implementation  Nutrition education: Per Provider order if indicated   Medical Food Supplement:  Discontinued as above     Nutrition Goals  Patient will continue to consume % of meals     MONITORING AND EVALUATION:  Progress towards goals will be monitored and evaluated per protocol and Practice Guidelines    Melinda Vuong RD, LD, CNSC   Clinical Dietitian - [facility]

## 2017-10-10 NOTE — PROGRESS NOTES
[facility]    ICU Progress Note    Assessment & Plan   Kathryn Banks is a 77 year old female who was admitted on 10/2/2017 from an outside hospital for evaluation and management of sepsis likely secondary to pneumonia. Upon arrival she was requiring vasopressor support, which has been weaned off. Intermittent hemodialysis was initiated on 10/2/2017 out of concern for acute on chronic renal failure, hyperkalemia, and metabolic acidosis. Her oxygenation has improved and she is currently on HF nasal cannula.     Active Problems:  Acute respiratory failure, improving  Obstructive sleep apnea, treated  Pneumonia, treated  Pleural effusion, left  HFpEF  Atrial fibrillation, paroxysmal   End-stage renal disease, now requiring dialysis     Neurologic:  #1 Altered mental status, likely multifactorial encephalopathy versus delirium, resolving  Initially with altered mental status and has improved. Alert, oriented, very hard of hearing.   Plan:  - encourage sleep/wake cycle  - avoid medications known to cause delirium; Tylenol as needed for pain, avoid opiates  - redirect, as needed     Pulmonary:  #1 Acute respiratory failure, likely secondary to pneumonia versus pulmonary edema secondary to decompensated heart failure, improved   #2 Obstructive sleep apnea, treated  #3 Pneumonia, treated  #4 Pleural effusion, left  Has required BiPap intermittently, now on HF nasal cannula. Was on flat-bedrest for several days due to femoral line and has had atelectasis on chest Xray. Chest Xray 10/9/2017 with persistent but slightly improved bilateral alveolar pulmonary infiltrates and slightly worse moderate left pleural effusion. Chest Xray at outside facility was notable for concern of pneumonia. Currently afebrile, white count is normal, Procalcitonin on 10/7/2017 0.75. Was initially started on empiric IV Vancomycin and Zosyn, now on Meropenem. Ultrasound guided thoracentesis has been considered this admission.    Plan:  - continue HF nasal cannula  - encourage BiPap during sleep as she has as history of ANABEL  - highly encourage pulmonary hygiene, up to chair as tolerated  - scheduled and PRN nebs  - Check CT Chest evaluate infiltrate and left sided effusion - suspect will need to drain     Cardiovascular:  #1 Heart failure, with preserved ejection fraction, secondary to ischemic heart disease  #2 Atrial fibrillation, paroxysmal  #3 Status post ablation for atrial fibrillation, June 2017  Echocardiogram 10/2/2017 with unchanged EF of 60-65%, pulmonary hypertension, diastolic dysfunction, and mild-moderately reduced RV function. On admission Chest Xray was concerning for pulmonary edema; peripheral edema was present on admission suggesting decompensated heart failure, which could have contributed to her respiratory status. Cardiology has been consulted with recommendation to consider ischemic work-up as an outpatient. She has intermittently been in atrial fibrillation since 10/4/2017; currently in sinus rhythm. Many rhythm changes have been with dialysis. She required diltiazem drip for rate control on 10/4/2017. Now on oral metoprolol.  Plan:  - continue Metoprolol, amlodipine, and Bumex  - replete electrolytes, as needed  - metoprolol for rate control, consider diltiazem if RVR  - Warfarin resumed 10/9/2017 (CHADS-VASc2 4)    Renal:  #1 End-stage renal disease, now on dialysis  #2 Oliguria  #3 Hypokalemia, treated  #4 Hypomagnesemia, treated  She was not requiring dialysis prior to this admission, likely now requiring dialysis secondary to hypovolemia and hypotension present on admission. Intermittent hemodialysis initiated 10/2/2017, RIJ tunneled line placed 10/6/2017. Greatly appreciate Nephrology recommendations. Urine output has improved with Bumex. UOP 10/9/2017 1030. Cee in-place. Encouraging she is making urine.  Plan:  - dialysis per Nephrology   - continue Bumex 2- mg every 8- hours  - replete electrolytes as  needed  - continue Cee for accurate I/O    Infectious Disease   #1 Pneumonia, treated  #2 Personal history of clostridium difficile, treated  Chest Xray on 10/5/2017 was concerning for worsening respiratory status and Zosyn was switched to Meropenem. She was diagnosed with C. Dif at [facility] on 9/14/2017 and initially treated with PO metronidazole without response and transitioned to PO Vancomycin on 9/22/2017. The 14- days course of Vancomycin was complete on 10/6/2017, negative PCR this admission. Procal 10/10/2017 4.32; however no clinical signs of infection.   Plan:  - still have Cee and need to continue for accurate I/O with renal failure  - discontinue Meropenem after today's dose.   - repeat procalcitonin in 2- days (as level up from prior)    GI/Nutrition  #1 Altered nutrition, secondary to critical illness  Plan:  - continue dialysis diet with Nepro supplement     Endocrine  #1 Hyperglycemia, secondary to critical illness  Does not have a history of diabetes.   Plan:  - finger stick every 4- hours with correction scale to keep glucose less than 150    Musculoskeletal  #1 Bilateral lower extremity wounds, likely venous  She states she had a full work-up at [facility]. Wounds have been present for 2- months and she states they are improving.   Plan:  - continue local wound care    ICU Prophylaxis:  DVT: SQ Heparin  Restaints: No     Code Status: Full Code    Disposition: Expected discharge- pending respiratory improvement     Emy Armas, APRN, CNP scribed for Dr Portillo    Interval History   No acute events overnight. Will dialyze tomorrow.      -Data reviewed today: I reviewed all new labs and imaging results over the last 24 hours. I personally reviewed no images or EKG's today.    Physical Exam   Temp: 98.1  F (36.7  C) Temp src: Oral BP: 101/61   Heart Rate: 89 Resp: 18 SpO2: 96 % O2 Device: High Flow Nasal Cannula (HFNC)    Vitals:    10/07/17 0743 10/08/17 0630 10/10/17 0600   Weight: 70.5 kg (155 lb  6.8 oz) 68.8 kg (151 lb 10.8 oz) 70.8 kg (156 lb 1.4 oz)     Vital Signs with Ranges  Temp:  [97.5  F (36.4  C)-98.1  F (36.7  C)] 98.1  F (36.7  C)  Heart Rate:  [62-89] 89  Resp:  [7-31] 18  BP: (101-138)/(55-85) 101/61  FiO2 (%):  [60 %-70 %] 70 %  SpO2:  [90 %-99 %] 96 %  I/O last 3 completed shifts:  In: 536.17 [I.V.:536.17]  Out: 4235 [Urine:1235; Other:3000]    General: Appears stated age, no acute distress. [place]   Skin:  Warm, dry. No rashes or lesions on exposed skin.  HEENT:  Normocephalic, atraumatic.  Chest: No increased work of breathing. Does require supplemental oxygen via HF nasal cannula. Decreased BS posteriorly   Cardiovascular:  Sinus rhythm. Extremities appear well-perfused.   Abdomen:  Soft, non-tender, non-distended.   Musculoskeletal:  Moves all four extremities. Bilateral lower extremity wounds appear non-infected.  Neurological:  Alert and oriented x 4. Cranial nerves II-XII grossly intact.   Psychiatric:  Affect and mood congruent.    Medications     Warfarin Therapy Reminder       IV fluid REPLACEMENT ONLY         warfarin  2 mg Oral ONCE at 18:00     pantoprazole  40 mg Oral QAM     insulin glargine  5 Units Subcutaneous QAM AC     metoprolol  50 mg Oral BID     insulin aspart  1-7 Units Subcutaneous Q4H     ipratropium - albuterol 0.5 mg/2.5 mg/3 mL  1 vial Nebulization TID     pentoxifylline  400 mg Oral Daily     sodium chloride (PF)  10 mL Intracatheter Q8H     meropenem  500 mg Intravenous Q24H     bumetanide  2 mg Intravenous Q8H     cholecalciferol  5,000 Units Oral Daily     calcitRIOL  0.25 mcg Oral Daily     heparin  5,000 Units Subcutaneous Q8H     - MEDICATION INSTRUCTIONS for Dialysis Patients -   Does not apply See Admin Instructions       Data     Recent Labs  Lab 10/10/17  0409 10/09/17  0441 10/08/17  1230 10/08/17  0411 10/07/17  1825  10/06/17  0930   WBC 9.3 7.9 8.3  --   --   < >  --    HGB 8.5* 6.9* 7.2*  --   --   < >  --    MCV 90 88 88  --   --   < >  --    PLT  129* 130* 134*  --   --   < >  --    INR 0.98  --   --   --   --   --  0.95    145*  --  145* 144  < >  --    POTASSIUM 3.6 3.0*  --  3.4 3.6  < > Canceled, Test credited   CHLORIDE 109 107  --  108 107  < >  --    CO2 29 31  --  29 27  < >  --    BUN 12 29  --  24 21  < >  --    CR 2.80* 3.98*  --  3.28* 2.93*  < >  --    ANIONGAP 5 7  --   --  10  < >  --    MALICK 7.7* 7.6*  --  7.7* 7.7*  < >  --    * 187*  --  137* 211*  < >  --    ALBUMIN 2.0*  --   --  2.2* 2.1*  < >  --    PROTTOTAL 5.0*  --   --  5.1*  --   --   --    BILITOTAL 0.3  --   --  0.4  --   --   --    ALKPHOS 147  --   --  152*  --   --   --    ALT 9  --   --  10  --   --   --    AST 12  --   --  12  --   --   --    < > = values in this interval not displayed.    Imaging:   No results found for this or any previous visit (from the past 24 hour(s)).  35 min CCT spent

## 2017-10-11 ENCOUNTER — APPOINTMENT (OUTPATIENT)
Dept: ULTRASOUND IMAGING | Facility: CLINIC | Age: 77
DRG: 871 | End: 2017-10-11
Attending: NURSE PRACTITIONER
Payer: MEDICARE

## 2017-10-11 LAB
ALBUMIN SERPL-MCNC: 1.9 G/DL (ref 3.4–5)
ALP SERPL-CCNC: 145 U/L (ref 40–150)
ALT SERPL W P-5'-P-CCNC: 9 U/L (ref 0–50)
ANION GAP SERPL CALCULATED.3IONS-SCNC: 7 MMOL/L (ref 3–14)
APPEARANCE FLD: CLEAR
APTT PPP: 39 SEC (ref 22–37)
AST SERPL W P-5'-P-CCNC: 12 U/L (ref 0–45)
BASOPHILS # BLD AUTO: 0 10E9/L (ref 0–0.2)
BASOPHILS NFR BLD AUTO: 0.4 %
BILIRUB SERPL-MCNC: 0.3 MG/DL (ref 0.2–1.3)
BUN SERPL-MCNC: 18 MG/DL (ref 7–30)
CALCIUM SERPL-MCNC: 7.8 MG/DL (ref 8.5–10.1)
CHLORIDE SERPL-SCNC: 110 MMOL/L (ref 94–109)
CO2 SERPL-SCNC: 28 MMOL/L (ref 20–32)
COLOR FLD: YELLOW
CREAT SERPL-MCNC: 3.68 MG/DL (ref 0.52–1.04)
DIFFERENTIAL METHOD BLD: ABNORMAL
EOSINOPHIL # BLD AUTO: 0.4 10E9/L (ref 0–0.7)
EOSINOPHIL NFR BLD AUTO: 4.5 %
EOSINOPHIL NFR FLD MANUAL: 1 %
ERYTHROCYTE [DISTWIDTH] IN BLOOD BY AUTOMATED COUNT: 18.6 % (ref 10–15)
GFR SERPL CREATININE-BSD FRML MDRD: 12 ML/MIN/1.7M2
GLUCOSE BLDC GLUCOMTR-MCNC: 129 MG/DL (ref 70–99)
GLUCOSE BLDC GLUCOMTR-MCNC: 151 MG/DL (ref 70–99)
GLUCOSE BLDC GLUCOMTR-MCNC: 172 MG/DL (ref 70–99)
GLUCOSE BLDC GLUCOMTR-MCNC: 220 MG/DL (ref 70–99)
GLUCOSE BLDC GLUCOMTR-MCNC: 243 MG/DL (ref 70–99)
GLUCOSE BLDC GLUCOMTR-MCNC: 335 MG/DL (ref 70–99)
GLUCOSE FLD-MCNC: 119 MG/DL
GLUCOSE SERPL-MCNC: 139 MG/DL (ref 70–99)
GRAM STN SPEC: NORMAL
GRAM STN SPEC: NORMAL
HCT VFR BLD AUTO: 27.8 % (ref 35–47)
HGB BLD-MCNC: 8.6 G/DL (ref 11.7–15.7)
IMM GRANULOCYTES # BLD: 0.4 10E9/L (ref 0–0.4)
IMM GRANULOCYTES NFR BLD: 4.5 %
INR PPP: 1.08 (ref 0.86–1.14)
LDH FLD L TO P-CCNC: 117 U/L
LYMPHOCYTES # BLD AUTO: 1.1 10E9/L (ref 0.8–5.3)
LYMPHOCYTES NFR BLD AUTO: 12.6 %
LYMPHOCYTES NFR FLD MANUAL: 20 %
MCH RBC QN AUTO: 27.9 PG (ref 26.5–33)
MCHC RBC AUTO-ENTMCNC: 30.9 G/DL (ref 31.5–36.5)
MCV RBC AUTO: 90 FL (ref 78–100)
MONOCYTES # BLD AUTO: 0.6 10E9/L (ref 0–1.3)
MONOCYTES NFR BLD AUTO: 7.5 %
MONOS+MACROS NFR FLD MANUAL: 16 %
NEUTROPHILS # BLD AUTO: 5.9 10E9/L (ref 1.6–8.3)
NEUTROPHILS NFR BLD AUTO: 70.5 %
NEUTS BAND NFR FLD MANUAL: 62 %
NRBC # BLD AUTO: 0 10*3/UL
NRBC BLD AUTO-RTO: 0 /100
OTHER CELLS FLD MANUAL: 1 %
PLATELET # BLD AUTO: 152 10E9/L (ref 150–450)
POTASSIUM SERPL-SCNC: 3.7 MMOL/L (ref 3.4–5.3)
PROT FLD-MCNC: 1.8 G/DL
PROT SERPL-MCNC: 4.9 G/DL (ref 6.8–8.8)
RBC # BLD AUTO: 3.08 10E12/L (ref 3.8–5.2)
SODIUM SERPL-SCNC: 145 MMOL/L (ref 133–144)
SPECIMEN SOURCE FLD: NORMAL
SPECIMEN SOURCE: NORMAL
T4 FREE SERPL-MCNC: 1.59 NG/DL (ref 0.76–1.46)
TSH SERPL DL<=0.005 MIU/L-ACNC: 5.94 MU/L (ref 0.4–4)
WBC # BLD AUTO: 8.4 10E9/L (ref 4–11)
WBC # FLD AUTO: 452 /UL

## 2017-10-11 PROCEDURE — S0171 BUMETANIDE 0.5 MG: HCPCS | Performed by: INTERNAL MEDICINE

## 2017-10-11 PROCEDURE — 94640 AIRWAY INHALATION TREATMENT: CPT | Mod: 76

## 2017-10-11 PROCEDURE — 99291 CRITICAL CARE FIRST HOUR: CPT | Performed by: INTERNAL MEDICINE

## 2017-10-11 PROCEDURE — 25000131 ZZH RX MED GY IP 250 OP 636 PS 637: Mod: GY | Performed by: NURSE PRACTITIONER

## 2017-10-11 PROCEDURE — 25000125 ZZHC RX 250: Performed by: NURSE PRACTITIONER

## 2017-10-11 PROCEDURE — A9270 NON-COVERED ITEM OR SERVICE: HCPCS | Mod: GY | Performed by: SURGERY

## 2017-10-11 PROCEDURE — 00000146 ZZHCL STATISTIC GLUCOSE BY METER IP

## 2017-10-11 PROCEDURE — 25000132 ZZH RX MED GY IP 250 OP 250 PS 637: Mod: GY | Performed by: INTERNAL MEDICINE

## 2017-10-11 PROCEDURE — 84443 ASSAY THYROID STIM HORMONE: CPT | Performed by: NURSE PRACTITIONER

## 2017-10-11 PROCEDURE — 27210190 US THORACENTESIS PORTABLE

## 2017-10-11 PROCEDURE — 84439 ASSAY OF FREE THYROXINE: CPT | Performed by: NURSE PRACTITIONER

## 2017-10-11 PROCEDURE — 40000239 ZZH STATISTIC VAT ROUNDS

## 2017-10-11 PROCEDURE — P9047 ALBUMIN (HUMAN), 25%, 50ML: HCPCS | Performed by: INTERNAL MEDICINE

## 2017-10-11 PROCEDURE — 63400005 ZZH RX 634: Performed by: INTERNAL MEDICINE

## 2017-10-11 PROCEDURE — 40000275 ZZH STATISTIC RCP TIME EA 10 MIN

## 2017-10-11 PROCEDURE — A9270 NON-COVERED ITEM OR SERVICE: HCPCS | Mod: GY | Performed by: INTERNAL MEDICINE

## 2017-10-11 PROCEDURE — 25000128 H RX IP 250 OP 636: Performed by: SURGERY

## 2017-10-11 PROCEDURE — 25000128 H RX IP 250 OP 636: Performed by: INTERNAL MEDICINE

## 2017-10-11 PROCEDURE — P9041 ALBUMIN (HUMAN),5%, 50ML: HCPCS | Performed by: INTERNAL MEDICINE

## 2017-10-11 PROCEDURE — 99233 SBSQ HOSP IP/OBS HIGH 50: CPT | Performed by: INTERNAL MEDICINE

## 2017-10-11 PROCEDURE — 89051 BODY FLUID CELL COUNT: CPT | Performed by: NURSE PRACTITIONER

## 2017-10-11 PROCEDURE — 83615 LACTATE (LD) (LDH) ENZYME: CPT | Performed by: NURSE PRACTITIONER

## 2017-10-11 PROCEDURE — 87205 SMEAR GRAM STAIN: CPT | Performed by: NURSE PRACTITIONER

## 2017-10-11 PROCEDURE — 80053 COMPREHEN METABOLIC PANEL: CPT | Performed by: NURSE PRACTITIONER

## 2017-10-11 PROCEDURE — 85025 COMPLETE CBC W/AUTO DIFF WBC: CPT | Performed by: NURSE PRACTITIONER

## 2017-10-11 PROCEDURE — 0W9B3ZZ DRAINAGE OF LEFT PLEURAL CAVITY, PERCUTANEOUS APPROACH: ICD-10-PCS | Performed by: RADIOLOGY

## 2017-10-11 PROCEDURE — 20000003 ZZH R&B ICU

## 2017-10-11 PROCEDURE — 85610 PROTHROMBIN TIME: CPT | Performed by: NURSE PRACTITIONER

## 2017-10-11 PROCEDURE — 90937 HEMODIALYSIS REPEATED EVAL: CPT

## 2017-10-11 PROCEDURE — 85730 THROMBOPLASTIN TIME PARTIAL: CPT | Performed by: NURSE PRACTITIONER

## 2017-10-11 PROCEDURE — 84157 ASSAY OF PROTEIN OTHER: CPT | Performed by: NURSE PRACTITIONER

## 2017-10-11 PROCEDURE — 25000125 ZZHC RX 250: Performed by: SURGERY

## 2017-10-11 PROCEDURE — 94660 CPAP INITIATION&MGMT: CPT

## 2017-10-11 PROCEDURE — 25000125 ZZHC RX 250: Performed by: INTERNAL MEDICINE

## 2017-10-11 PROCEDURE — 82945 GLUCOSE OTHER FLUID: CPT | Performed by: NURSE PRACTITIONER

## 2017-10-11 PROCEDURE — 25000128 H RX IP 250 OP 636: Performed by: NURSE PRACTITIONER

## 2017-10-11 PROCEDURE — 25000132 ZZH RX MED GY IP 250 OP 250 PS 637: Mod: GY | Performed by: SURGERY

## 2017-10-11 PROCEDURE — 87070 CULTURE OTHR SPECIMN AEROBIC: CPT | Performed by: NURSE PRACTITIONER

## 2017-10-11 PROCEDURE — 94640 AIRWAY INHALATION TREATMENT: CPT

## 2017-10-11 PROCEDURE — A9270 NON-COVERED ITEM OR SERVICE: HCPCS | Mod: GY | Performed by: NURSE PRACTITIONER

## 2017-10-11 PROCEDURE — 25000132 ZZH RX MED GY IP 250 OP 250 PS 637: Mod: GY | Performed by: NURSE PRACTITIONER

## 2017-10-11 RX ORDER — HEPARIN SODIUM 1000 [USP'U]/ML
500 INJECTION, SOLUTION INTRAVENOUS; SUBCUTANEOUS
Status: COMPLETED | OUTPATIENT
Start: 2017-10-11 | End: 2017-10-11

## 2017-10-11 RX ORDER — ALBUMIN (HUMAN) 12.5 G/50ML
12.5 SOLUTION INTRAVENOUS ONCE
Status: CANCELLED | OUTPATIENT
Start: 2017-10-11 | End: 2017-10-11

## 2017-10-11 RX ORDER — BUMETANIDE 1 MG/1
2 TABLET ORAL
Status: DISCONTINUED | OUTPATIENT
Start: 2017-10-11 | End: 2017-10-11

## 2017-10-11 RX ORDER — ALBUMIN (HUMAN) 12.5 G/50ML
25 SOLUTION INTRAVENOUS ONCE
Status: CANCELLED | OUTPATIENT
Start: 2017-10-11 | End: 2017-10-11

## 2017-10-11 RX ORDER — WARFARIN SODIUM 2 MG/1
2 TABLET ORAL
Status: COMPLETED | OUTPATIENT
Start: 2017-10-11 | End: 2017-10-11

## 2017-10-11 RX ORDER — ALBUMIN (HUMAN) 12.5 G/50ML
12.5 SOLUTION INTRAVENOUS ONCE
Status: COMPLETED | OUTPATIENT
Start: 2017-10-11 | End: 2017-10-11

## 2017-10-11 RX ORDER — LIDOCAINE HYDROCHLORIDE 10 MG/ML
5 INJECTION, SOLUTION EPIDURAL; INFILTRATION; INTRACAUDAL; PERINEURAL ONCE
Status: COMPLETED | OUTPATIENT
Start: 2017-10-11 | End: 2017-10-11

## 2017-10-11 RX ORDER — ALBUMIN (HUMAN) 12.5 G/50ML
25 SOLUTION INTRAVENOUS ONCE
Status: COMPLETED | OUTPATIENT
Start: 2017-10-11 | End: 2017-10-11

## 2017-10-11 RX ORDER — HEPARIN SODIUM 1000 [USP'U]/ML
500 INJECTION, SOLUTION INTRAVENOUS; SUBCUTANEOUS CONTINUOUS
Status: DISCONTINUED | OUTPATIENT
Start: 2017-10-11 | End: 2017-10-11

## 2017-10-11 RX ORDER — BUMETANIDE 1 MG/1
1 TABLET ORAL
Status: DISCONTINUED | OUTPATIENT
Start: 2017-10-12 | End: 2017-10-12

## 2017-10-11 RX ORDER — ALBUMIN (HUMAN) 12.5 G/50ML
SOLUTION INTRAVENOUS
Status: DISCONTINUED
Start: 2017-10-11 | End: 2017-10-12 | Stop reason: HOSPADM

## 2017-10-11 RX ORDER — ALBUMIN, HUMAN INJ 5% 5 %
500 SOLUTION INTRAVENOUS ONCE
Status: COMPLETED | OUTPATIENT
Start: 2017-10-11 | End: 2017-10-11

## 2017-10-11 RX ORDER — ALBUMIN (HUMAN) 12.5 G/50ML
50 SOLUTION INTRAVENOUS
Status: DISCONTINUED | OUTPATIENT
Start: 2017-10-11 | End: 2017-10-11

## 2017-10-11 RX ADMIN — IPRATROPIUM BROMIDE AND ALBUTEROL SULFATE 3 ML: .5; 3 SOLUTION RESPIRATORY (INHALATION) at 07:30

## 2017-10-11 RX ADMIN — ALBUMIN (HUMAN) 500 ML: 12.5 SOLUTION INTRAVENOUS at 21:33

## 2017-10-11 RX ADMIN — BUMETANIDE 2 MG: 1 TABLET ORAL at 16:02

## 2017-10-11 RX ADMIN — ALBUMIN (HUMAN) 12.5 G: 12.5 SOLUTION INTRAVENOUS at 12:34

## 2017-10-11 RX ADMIN — Medication 0.25 MG: at 08:08

## 2017-10-11 RX ADMIN — HEPARIN SODIUM 5000 UNITS: 5000 INJECTION, SOLUTION INTRAVENOUS; SUBCUTANEOUS at 00:05

## 2017-10-11 RX ADMIN — CHOLECALCIFEROL CAP 125 MCG (5000 UNIT) 5000 UNITS: 125 CAP at 11:38

## 2017-10-11 RX ADMIN — SODIUM CHLORIDE 1000 ML: 9 INJECTION, SOLUTION INTRAVENOUS at 08:35

## 2017-10-11 RX ADMIN — ALBUMIN (HUMAN) 25 G: 12.5 SOLUTION INTRAVENOUS at 18:27

## 2017-10-11 RX ADMIN — HEPARIN SODIUM 2000 UNITS: 1000 INJECTION, SOLUTION INTRAVENOUS; SUBCUTANEOUS at 11:22

## 2017-10-11 RX ADMIN — SODIUM CHLORIDE 250 ML: 9 INJECTION, SOLUTION INTRAVENOUS at 08:35

## 2017-10-11 RX ADMIN — DILTIAZEM HYDROCHLORIDE 10 MG/HR: 5 INJECTION INTRAVENOUS at 19:02

## 2017-10-11 RX ADMIN — IPRATROPIUM BROMIDE AND ALBUTEROL SULFATE 3 ML: .5; 3 SOLUTION RESPIRATORY (INHALATION) at 12:06

## 2017-10-11 RX ADMIN — Medication 0.25 MG: at 00:12

## 2017-10-11 RX ADMIN — METOPROLOL TARTRATE 50 MG: 50 TABLET ORAL at 11:37

## 2017-10-11 RX ADMIN — HEPARIN SODIUM 5000 UNITS: 5000 INJECTION, SOLUTION INTRAVENOUS; SUBCUTANEOUS at 16:16

## 2017-10-11 RX ADMIN — BUMETANIDE 2 MG: 0.25 INJECTION INTRAMUSCULAR; INTRAVENOUS at 00:03

## 2017-10-11 RX ADMIN — INSULIN GLARGINE 5 UNITS: 100 INJECTION, SOLUTION SUBCUTANEOUS at 08:00

## 2017-10-11 RX ADMIN — Medication 0.25 MG: at 20:28

## 2017-10-11 RX ADMIN — LIDOCAINE HYDROCHLORIDE 50 MG: 10 INJECTION, SOLUTION EPIDURAL; INFILTRATION; INTRACAUDAL; PERINEURAL at 12:11

## 2017-10-11 RX ADMIN — PENTOXIFYLLINE 400 MG: 400 TABLET, FILM COATED, EXTENDED RELEASE ORAL at 11:38

## 2017-10-11 RX ADMIN — HEPARIN SODIUM 500 UNITS: 1000 INJECTION, SOLUTION INTRAVENOUS; SUBCUTANEOUS at 08:36

## 2017-10-11 RX ADMIN — WARFARIN SODIUM 2 MG: 2 TABLET ORAL at 18:26

## 2017-10-11 RX ADMIN — BUMETANIDE 2 MG: 0.25 INJECTION INTRAMUSCULAR; INTRAVENOUS at 07:57

## 2017-10-11 RX ADMIN — IPRATROPIUM BROMIDE AND ALBUTEROL SULFATE 3 ML: .5; 3 SOLUTION RESPIRATORY (INHALATION) at 20:36

## 2017-10-11 RX ADMIN — HEPARIN SODIUM 500 UNITS/HR: 1000 INJECTION, SOLUTION INTRAVENOUS; SUBCUTANEOUS at 08:36

## 2017-10-11 RX ADMIN — PANTOPRAZOLE SODIUM 40 MG: 40 TABLET, DELAYED RELEASE ORAL at 11:38

## 2017-10-11 RX ADMIN — DILTIAZEM HYDROCHLORIDE 5 MG/HR: 5 INJECTION INTRAVENOUS at 13:57

## 2017-10-11 RX ADMIN — ALBUMIN (HUMAN) 12.5 G: 12.5 SOLUTION INTRAVENOUS at 12:04

## 2017-10-11 RX ADMIN — EPOETIN ALFA 4000 UNITS: 4000 SOLUTION INTRAVENOUS; SUBCUTANEOUS at 10:20

## 2017-10-11 NOTE — PLAN OF CARE
"Problem: Patient Care Overview  Goal: Plan of Care/Patient Progress Review  Outcome: Improving  Neuro: A&O x4, CAM neg. Denies pain.  Cardio: VSS. NSR on tele.  Resp: Lungs coarse/diminished. Frequent productive cough. Encourage IS. 70% HF while awake. Bipap 60% at night.  GI/: 720 mL UO overnight, significantly improved from yesterday. Lg loose stool.  Skin: Rash under breast tissue.  Other: Pt appears to be in good spirits, reported feeling \"much better than I have in awhile\" after HS cares.  Plan for possible pleurocentesis today.      "

## 2017-10-11 NOTE — PROGRESS NOTES
[facility]  Cardiology Progress Note  Date of Service: 10/11/2017     Assessment & Plan    Kathryn Banks is a 77 year old female with past medical history significant for CAD s/p CABG x 5, atrial flutter s/p ablation and DCCV 6/2017. Admitted on 10/2/2017 with sepsis and pneumonia with PAF RVR.     Assessment:  1.  Paroxysmal Atrial fibrillation  - converted to SR 0200 10/9, rhythm changes coincided with dialysis runs   - rate controlled   - Echo normal EF 60-65%, pulmonary HTN, diastolic dysfunction, mild-mod reduced RV function  - CHADsVASC 5 age, gender, HTN, DM   - likely from stress of sepsis and pneumonia   - po lopressor 50 mg BID  - coumadin for anticoagulation    2. S/P atrial flutter ablation   - DCCV 6/2017    3. CAD   - s/p CABG  - 2007 graft to PL occluded and 4 other grafts patent  - no angina     4. ESRD   - fluid overload with pleural effusions L>R   - Lt thoracentesis with 1250 mL removed 10/11   - dialysis started 10/2  - per nephrology switching iv bumex to PO     5. Respiratory failure   - managed by intensivist   - high flow nasal cannula/ BIPAP      Plan:     1. Recheck TSH    2. Discontinue metoprolol d/t hypotension with needing to restart diltiazem for rate control   3. Restart diltiazem gtt and titrate to blood pressure       Cyn Shelton  2:31 PM 10/9/2017   [facility]  Pager: [phone]      Interval History   Pt has returned to Atrial fibrillation RVR at 120s doing well with no symptoms. She is on bipap intermittently with high flow nasal cannula.  Does have some SOB with respiratory failure and on oxygen currently. Also pleural effusion with thoracentesis today, on dialysis and PO bumex.     Physical Exam   Temp: 98  F (36.7  C) Temp src: Oral BP: 96/71   Heart Rate: 142 Resp: (P) 20 SpO2: 100 % O2 Device: High Flow Nasal Cannula (HFNC) Oxygen Delivery: Other (Comments) (30LPM)  Vitals:    10/07/17 0743 10/08/17 0630 10/10/17 0600   Weight: 70.5 kg (155 lb  6.8 oz) 68.8 kg (151 lb 10.8 oz) 70.8 kg (156 lb 1.4 oz)       Constitutional   alert and oriented, in no acute distress.  Skin   warm and dry to touch  ENT   no pallor or cyanosis  Neck  JVP elevated  Chest   no tenderness to palpation   Lungs Decreased lung sounds bilaterally. No wheezes, rales or rhonchi  Cardiac RRR, S1 normal, S2 normal, No S3 or S4, no murmurs, no rubs  Abdomen   abdomen soft  Extremities and Back   no clubbing, cyanosis. No edema.  2 + radial pulse bilaterally  Neurological   no gross motor deficits noted, affect appropriate, oriented to time, person and place.    Medications     diltiazem (CARDIZEM) infusion ADULT 10 mg/hr (10/11/17 1602)     Warfarin Therapy Reminder       IV fluid REPLACEMENT ONLY         bumetanide  2 mg Oral BID     albumin human         warfarin  2 mg Oral ONCE at 18:00     pantoprazole  40 mg Oral QAM     insulin glargine  5 Units Subcutaneous QAM AC     metoprolol  50 mg Oral BID     insulin aspart  1-7 Units Subcutaneous Q4H     ipratropium - albuterol 0.5 mg/2.5 mg/3 mL  1 vial Nebulization TID     pentoxifylline  400 mg Oral Daily     sodium chloride (PF)  10 mL Intracatheter Q8H     cholecalciferol  5,000 Units Oral Daily     heparin  5,000 Units Subcutaneous Q8H     - MEDICATION INSTRUCTIONS for Dialysis Patients -   Does not apply See Admin Instructions       Data   Most Recent 3 CBC's:  Recent Labs   Lab Test  10/11/17   0420  10/10/17   0409  10/09/17   0441   WBC  8.4  9.3  7.9   HGB  8.6*  8.5*  6.9*   MCV  90  90  88   PLT  152  129*  130*     Most Recent 3 BMP's:  Recent Labs   Lab Test  10/11/17   0420  10/10/17   0409  10/09/17   0441   NA  145*  143  145*   POTASSIUM  3.7  3.6  3.0*   CHLORIDE  110*  109  107   CO2  28  29  31   BUN  18  12  29   CR  3.68*  2.80*  3.98*   ANIONGAP  7  5  7   MALICK  7.8*  7.7*  7.6*   GLC  139*  147*  187*   Most Recent 3 INR's:  Recent Labs   Lab Test  10/11/17   0420  10/10/17   0409  10/06/17   0930   INR  1.08  0.98   0.95   Most Recent 3 Troponin's:  Recent Labs   Lab Test  08/26/17   0637  07/11/17   1604  07/11/17   0940   02/22/14   0024   TROPI  <0.015  0.022  0.022   < >   --    TROPONIN   --    --    --    --   0.01    < > = values in this interval not displayed.   Most Recent 3 BNP's:  Recent Labs   Lab Test  09/22/17   0818  09/13/17   1849  09/06/17   0741  08/26/17   0637  08/12/17   1013   01/22/13   1031   NTBNPI   --   98046*   --   29008*  24537*   < >   --    NTBNP  02151*   --   21383*   --    --    --   1280*    < > = values in this interval not displayed.   Most Recent Cholesterol Panel:  Recent Labs   Lab Test  04/12/16   1132   CHOL  127   LDL  47   HDL  65   TRIG  73   Most Recent TSH and T4:  Recent Labs   Lab Test  04/28/17   1050   TSH  0.75       Last 24 hours labs reviewed   EKG: (reviewed personally) 10/4/17 Atrial fibrillation vent rate 147    Imaging:   10/9/17 0610       IMPRESSION: Persistent bilateral alveolar pulmonary infiltrates,  slightly improved. Moderate left pleural effusion is slightly worse.  Normal heart size. Sternal wires. Catheter from right subclavian  approach with the tip in the upper right atrium. PICC line from the  right with the tip at the cavoatrial junction.    Tele: A fib 120s    Echo:   10/3   Interpretation Summary     The visual ejection fraction is estimated at 60-65%.  Normal left ventricular wall motion  Left ventricular diastolic function is indeterminate.  The right ventricle is mild to moderately dilated.  The right ventricular systolic function is mild to moderately reduced.  The left atrium is moderately dilated.  The right ventricular systolic pressure is approximated at 42mmHg plus the  right atrial pressure.  Normal IVC (1.5-2.5cm) with <50% respiratory collapse; right atrial pressure  is estimated at 10-15mmHg.  Moderate (46-55mmHg) pulmonary hypertension is present.  Moderate left pleural effusion  Sinus rhythm was noted.  Compared to prior study, there is  no significant change.

## 2017-10-11 NOTE — PROGRESS NOTES
Potassium   Date Value Ref Range Status   10/11/2017 3.7 3.4 - 5.3 mmol/L Final   ]  Lab Results   Component Value Date    HGB 8.6 10/11/2017     Weight: 70.8 kg (156 lb 1.4 oz)    DIALYSIS PROCEDURE NOTE    Patient dialyzed for 3 hrs on a 3 K bath with a  net fluid removal of 1.6L.  A BFR of 400ml/min was obtained via RIJ and all connections secured.   Patient was seen by  during treatment.  Total heparin received during treatment:: 1500units.  Heparin instilled in both ports post run.  Meds given:epogen Complications:none   Procedure and ESRD teaching done and questions answered.  See flowsheet in EPIC for further details.    Patient dialyyzed in ICU at bedside.  Catheter Access:  Aseptic prep done for both on/off.  Water alarm in place and used.  [facility] consent form signed yes.

## 2017-10-11 NOTE — PLAN OF CARE
Problem: Sepsis/Septic Shock (Adult)  Goal: Signs and Symptoms of Listed Potential Problems Will be Absent, Minimized or Managed (Sepsis/Septic Shock)  Signs and symptoms of listed potential problems will be absent, minimized or managed by discharge/transition of care (reference Sepsis/Septic Shock (Adult) CPG).   Outcome: No Change  [facility]   Intensive Care Unit   Nursing Note     Vital signs stable during the day.  PERRL.  Moves all extremities.  Follows commands.  Pt on HFNC 70%, 20 LPM flow.  Pt had a Chest CT and US thoracentesis is planned for tomorrow. Dialysis is also planned for tomorrow. Pt had a large liquid BM on the BSC. Labs noted.  Continue to monitor closely.        ROUTINE IP LABS (Last four results)   BMP  Recent Labs  Lab 10/10/17  0409 10/09/17  0441 10/08/17  0411 10/07/17  1825    145* 145* 144   POTASSIUM 3.6 3.0* 3.4 3.6   CHLORIDE 109 107 108 107   MALICK 7.7* 7.6* 7.7* 7.7*   CO2 29 31 29 27   BUN 12 29 24 21   CR 2.80* 3.98* 3.28* 2.93*   * 187* 137* 211*     CBC  Recent Labs  Lab 10/10/17  0409 10/09/17  0441 10/08/17  1230 10/07/17  0442   WBC 9.3 7.9 8.3 7.2   RBC 3.07* 2.49* 2.62* 2.79*   HGB 8.5* 6.9* 7.2* 7.6*   HCT 27.5* 22.0* 23.0* 24.0*   MCV 90 88 88 86   MCH 27.7 27.7 27.5 27.2   MCHC 30.9* 31.4* 31.3* 31.7   RDW 18.4* 18.1* 18.0* 17.9*   * 130* 134* 144*     INR  Recent Labs  Lab 10/10/17  0409 10/06/17  0930   INR 0.98 0.95        Cordell Dallas RN

## 2017-10-11 NOTE — PLAN OF CARE
Problem: Patient Care Overview  Goal: Plan of Care/Patient Progress Review  OT and PT: Attempted to see however patient on bipap and currently having dialysis. Noted thoracentesis around 2 this PM. Holding therapy today.

## 2017-10-11 NOTE — PROGRESS NOTES
[facility]    ICU Progress Note    Assessment & Plan   Kathryn Banks is a 77 year old female who was admitted on 10/2/2017 from an outside hospital for evaluation and management of sepsis likely secondary to pneumonia. Upon arrival she was requiring vasopressor support, which has been weaned off. Intermittent hemodialysis was initiated on 10/2/2017 out of concern for acute on chronic renal failure, hyperkalemia, and metabolic acidosis. Her oxygenation has improved and she is currently on HF nasal cannula. Chest CT on 10/10/2017 noted continued bilateral pleural effusions, left greater than right. An US guided bedside thoracentesis is planned for today.     Active Problems:  Acute respiratory failure, improving  Obstructive sleep apnea, treated  Pneumonia, treated  Pleural effusion, bilateral, left greater than right  HFpEF  Atrial fibrillation, paroxysmal   End-stage renal disease, now requiring dialysis     Neurologic:  #1 Altered mental status, likely multifactorial encephalopathy versus delirium, resolving  Initially with altered mental status and has improved. Alert, oriented, very hard of hearing.   Plan:  - encourage sleep/wake cycle  - avoid medications known to cause delirium; Tylenol as needed for pain, avoid opiates  - redirect, as needed     Pulmonary:  #1 Acute respiratory failure, likely secondary to pneumonia versus pulmonary edema secondary to decompensated heart failure, improved   #2 Obstructive sleep apnea, treated  #3 Pneumonia, treated  #4 Pleural effusion, left  Has required BiPap intermittently, now on HF nasal cannula. Was on flat-bedrest for several days due to femoral line and has had atelectasis on chest Xray. Chest Xray 10/9/2017 with persistent but slightly improved bilateral alveolar pulmonary infiltrates and slightly worse moderate left pleural effusion. Chest Xray at outside facility was notable for concern of pneumonia. Currently afebrile, white count is normal,  Procalcitonin on 10/7/2017 0.75. Was initially started on empiric IV Vancomycin and Zosyn, now on Meropenem. Ultrasound guided thoracentesis today.  Plan:  - continue HF nasal cannula  - encourage BiPap during sleep as she has as history of ANABEL  - highly encourage pulmonary hygiene, up to chair as tolerated  - scheduled and PRN nebs  - US guided thoracentesis today    Cardiovascular:  #1 Heart failure, with preserved ejection fraction, secondary to ischemic heart disease  #2 Atrial fibrillation, paroxysmal  #3 Status post ablation for atrial fibrillation, June 2017  Echocardiogram 10/2/2017 with unchanged EF of 60-65%, pulmonary hypertension, diastolic dysfunction, and mild-moderately reduced RV function. On admission Chest Xray was concerning for pulmonary edema; peripheral edema was present on admission suggesting decompensated heart failure, which could have contributed to her respiratory status. Cardiology has been consulted with recommendation to consider ischemic work-up as an outpatient. She has intermittently been in atrial fibrillation since 10/4/2017; currently in sinus rhythm. Many rhythm changes have been with dialysis. She required diltiazem drip for rate control on 10/4/2017. Now on oral metoprolol.  Plan:  - continue Metoprolol, amlodipine, and Bumex  - replete electrolytes, as needed  - metoprolol for rate control, consider diltiazem if RVR  - Warfarin resumed 10/9/2017 and held 10/10/2017 for thoracentesis (CHADS-VASc2 4)    Renal:  #1 End-stage renal disease, now on dialysis  #2 Oliguria  #3 Hypokalemia, treated  #4 Hypomagnesemia, treated  She was not requiring dialysis prior to this admission, likely now requiring dialysis secondary to hypovolemia and hypotension present on admission. Intermittent hemodialysis initiated 10/2/2017, RIJ tunneled line placed 10/6/2017. Greatly appreciate Nephrology recommendations. Urine output has improved with Bumex. UOP 10/10/2017 1130. Cee in-place.  Encouraging she is making urine.  Plan:  - dialysis per Nephrology   - continue Bumex 2- mg every 8- hours  - replete electrolytes as needed  - continue Cee for accurate I/O    Infectious Disease   #1 Pneumonia, treated  #2 Personal history of clostridium difficile, treated  Chest Xray on 10/5/2017 was concerning for worsening respiratory status and Zosyn was switched to Meropenem. She was diagnosed with C. Dif at [facility] on 9/14/2017 and initially treated with PO metronidazole without response and transitioned to PO Vancomycin on 9/22/2017. The 14- days course of Vancomycin was complete on 10/6/2017, negative PCR this admission. Procal 10/10/2017 4.32; however no clinical signs of infection. 8- day course of Meropenem completed 10/10/2017, currently not on antibiotics.   Plan:  - still have Cee and need to continue for accurate I/O with renal failure  - repeat procalcitonin tomorrow (as level up from prior)    GI/Nutrition  #1 Altered nutrition, secondary to critical illness  Plan:  - continue dialysis diet with Nepro supplement     Endocrine  #1 Hyperglycemia, secondary to critical illness  Does not have a history of diabetes.   Plan:  - finger stick every 4- hours with correction scale to keep glucose less than 150    Musculoskeletal  #1 Bilateral lower extremity wounds, likely venous  She states she had a full work-up at [facility]. Wounds have been present for 2- months and she states they are improving.   Plan:  - continue local wound care    ICU Prophylaxis:  DVT: SQ Heparin  Restaints: No     Code Status: Full Code    Disposition: Expected discharge- pending respiratory improvement     Emy Armas, APRN, CNP scribed for Dr Bandar Portillo      Interval History   No acute events overnight. Resting on BiPap this morning. On dialysis now.    -Data reviewed today: I reviewed all new labs and imaging results over the last 24 hours. I personally reviewed no images or EKG's today.    Physical  Exam   Temp: 97.7  F (36.5  C) Temp src: Axillary BP: 115/70   Heart Rate: 87 Resp: 24 SpO2: 98 % O2 Device: BiPAP/CPAP Oxygen Delivery: Other (Comments) (30lpm)  Vitals:    10/07/17 0743 10/08/17 0630 10/10/17 0600   Weight: 70.5 kg (155 lb 6.8 oz) 68.8 kg (151 lb 10.8 oz) 70.8 kg (156 lb 1.4 oz)     Vital Signs with Ranges  Temp:  [97.1  F (36.2  C)-98.2  F (36.8  C)] 97.7  F (36.5  C)  Heart Rate:  [66-92] 87  Resp:  [12-51] 24  BP: ()/(53-89) 115/70  FiO2 (%):  [60 %-80 %] 80 %  SpO2:  [85 %-100 %] 98 %  I/O last 3 completed shifts:  In: 600 [P.O.:560; I.V.:40]  Out: 1020 [Urine:1020]    General: Appears stated age, no acute distress. [place]   Skin:  Warm, dry. No rashes or lesions on exposed skin.  HEENT:  Normocephalic, atraumatic.  Chest: No increased work of breathing. Does require supplemental, on BiPap while resting. Bilateral anterior lung sounds coarse.   Cardiovascular:  Sinus rhythm. Extremities appear well-perfused. S1, S2 regular rate and rhythm.   Abdomen:  Soft, non-tender, non-distended.   Musculoskeletal:  Moves all four extremities. Bilateral lower extremity wounds appear non-infected.  Neurological:  Alert and oriented x 4. Cranial nerves II-XII grossly intact.   Psychiatric:  Affect and mood congruent.    Medications     heparin (porcine) 500 Units/hr (10/11/17 0836)     Warfarin Therapy Reminder       IV fluid REPLACEMENT ONLY         epoetin hira (EPOGEN,PROCRIT) inj ESRD  4,000 Units Intravenous See Admin Instructions     pantoprazole  40 mg Oral QAM     insulin glargine  5 Units Subcutaneous QAM AC     metoprolol  50 mg Oral BID     insulin aspart  1-7 Units Subcutaneous Q4H     ipratropium - albuterol 0.5 mg/2.5 mg/3 mL  1 vial Nebulization TID     pentoxifylline  400 mg Oral Daily     sodium chloride (PF)  10 mL Intracatheter Q8H     bumetanide  2 mg Intravenous Q8H     cholecalciferol  5,000 Units Oral Daily     calcitRIOL  0.25 mcg Oral Daily     heparin  5,000 Units Subcutaneous  Q8H     - MEDICATION INSTRUCTIONS for Dialysis Patients -   Does not apply See Admin Instructions       Data     Recent Labs  Lab 10/11/17  0420 10/10/17  0409 10/09/17  0441  10/06/17  0930   WBC 8.4 9.3 7.9  < >  --    HGB 8.6* 8.5* 6.9*  < >  --    MCV 90 90 88  < >  --     129* 130*  < >  --    INR 1.08 0.98  --   --  0.95   * 143 145*  < >  --    POTASSIUM 3.7 3.6 3.0*  < > Canceled, Test credited   CHLORIDE 110* 109 107  < >  --    CO2 28 29 31  < >  --    BUN 18 12 29  < >  --    CR 3.68* 2.80* 3.98*  < >  --    ANIONGAP 7 5 7  < >  --    MALICK 7.8* 7.7* 7.6*  < >  --    * 147* 187*  < >  --    ALBUMIN 1.9* 2.0*  --   < >  --    PROTTOTAL 4.9* 5.0*  --   < >  --    BILITOTAL 0.3 0.3  --   < >  --    ALKPHOS 145 147  --   < >  --    ALT 9 9  --   < >  --    AST 12 12  --   < >  --    < > = values in this interval not displayed.    Imaging:   Recent Results (from the past 24 hour(s))   CT Chest w/o Contrast    Narrative    CT CHEST WITHOUT CONTRAST 10/10/2017 3:51 PM     HISTORY: Evaluate infiltrate and left-sided effusion.    TECHNIQUE: Volumetric acquisition of the chest  without contrast.  Radiation dose for this scan was reduced using automated exposure  control, adjustment of the mA and/or kV according to patient size, or  iterative reconstruction technique.    COMPARISON: Chest x-ray 10/9/2017.    FINDINGS: Moderate bilateral pleural effusions, greater on the left.  Associated moderate left and mild right bibasilar airspace opacities  with consolidation which are likely due to atelectasis. Additional  patchy areas of mixed groundglass and interstitial infiltrate in both  lungs probably due to edema or less likely infection. Heart size  within normal limits. Prominent left atrium. Coronary artery  calcifications. Tiny left upper lung nodule (series 3, image 17)  measuring less than 0.4 cm. Right PICC and right-sided central venous  catheters with tips in the SVC. Large gallstones. Mild  degenerative  and hypertrophic changes in the visualized spine.      Impression    IMPRESSION:  1. Moderate pleural effusions and dependent atelectasis. Patchy  diffuse areas of groundglass and some interstitial opacity in both  lungs likely due to edema or possibly pneumonia.  2. Suspect findings most likely due to CHF. Correlate clinically.  3. Tiny left upper lung nodule, technically indeterminate but of  doubtful significance. See below.  4. Cholelithiasis.    Recommendations for an incidental lung nodule > 8mm:    Low risk patients: Consider follow-up CT in 3 months, PET/CT, and/or  tissue sampling.    High risk patients: Same as for low risk patients.    *Low Risk: Minimal or absent history of smoking or other known risk  factors.  *Nonsolid (ground-glass) or partly solid nodules may require longer  follow-up to exclude indolent adenocarcinoma.  *Recommendations based on Guidelines for the Management of Incidental  Pulmonary Nodules Detected at CT: From the Fleischner Society 2017,  Radiology 2017.    BRENT SEGURA MD     35 min CCT spent

## 2017-10-12 ENCOUNTER — APPOINTMENT (OUTPATIENT)
Dept: GENERAL RADIOLOGY | Facility: CLINIC | Age: 77
DRG: 871 | End: 2017-10-12
Attending: INTERNAL MEDICINE
Payer: MEDICARE

## 2017-10-12 ENCOUNTER — APPOINTMENT (OUTPATIENT)
Dept: PHYSICAL THERAPY | Facility: CLINIC | Age: 77
DRG: 871 | End: 2017-10-12
Attending: NURSE PRACTITIONER
Payer: MEDICARE

## 2017-10-12 LAB
ALBUMIN SERPL-MCNC: 2.6 G/DL (ref 3.4–5)
ALP SERPL-CCNC: 128 U/L (ref 40–150)
ALT SERPL W P-5'-P-CCNC: 8 U/L (ref 0–50)
ANION GAP SERPL CALCULATED.3IONS-SCNC: 6 MMOL/L (ref 3–14)
AST SERPL W P-5'-P-CCNC: 9 U/L (ref 0–45)
BASOPHILS # BLD AUTO: 0 10E9/L (ref 0–0.2)
BASOPHILS NFR BLD AUTO: 0.2 %
BILIRUB SERPL-MCNC: 0.4 MG/DL (ref 0.2–1.3)
BUN SERPL-MCNC: 12 MG/DL (ref 7–30)
CALCIUM SERPL-MCNC: 7.4 MG/DL (ref 8.5–10.1)
CHLORIDE SERPL-SCNC: 107 MMOL/L (ref 94–109)
CO2 SERPL-SCNC: 29 MMOL/L (ref 20–32)
CREAT SERPL-MCNC: 2.79 MG/DL (ref 0.52–1.04)
DIFFERENTIAL METHOD BLD: ABNORMAL
EOSINOPHIL # BLD AUTO: 0.2 10E9/L (ref 0–0.7)
EOSINOPHIL NFR BLD AUTO: 1.8 %
ERYTHROCYTE [DISTWIDTH] IN BLOOD BY AUTOMATED COUNT: 18.6 % (ref 10–15)
GFR SERPL CREATININE-BSD FRML MDRD: 17 ML/MIN/1.7M2
GLUCOSE BLDC GLUCOMTR-MCNC: 128 MG/DL (ref 70–99)
GLUCOSE BLDC GLUCOMTR-MCNC: 144 MG/DL (ref 70–99)
GLUCOSE BLDC GLUCOMTR-MCNC: 159 MG/DL (ref 70–99)
GLUCOSE BLDC GLUCOMTR-MCNC: 197 MG/DL (ref 70–99)
GLUCOSE BLDC GLUCOMTR-MCNC: 230 MG/DL (ref 70–99)
GLUCOSE BLDC GLUCOMTR-MCNC: 305 MG/DL (ref 70–99)
GLUCOSE SERPL-MCNC: 162 MG/DL (ref 70–99)
GRAM STN SPEC: ABNORMAL
HCT VFR BLD AUTO: 26.6 % (ref 35–47)
HGB BLD-MCNC: 8.1 G/DL (ref 11.7–15.7)
IMM GRANULOCYTES # BLD: 0.2 10E9/L (ref 0–0.4)
IMM GRANULOCYTES NFR BLD: 2.4 %
INR PPP: 1.21 (ref 0.86–1.14)
LACTATE BLD-SCNC: 0.9 MMOL/L (ref 0.7–2)
LDH SERPL L TO P-CCNC: 237 U/L (ref 81–234)
LYMPHOCYTES # BLD AUTO: 1.4 10E9/L (ref 0.8–5.3)
LYMPHOCYTES NFR BLD AUTO: 14.9 %
Lab: ABNORMAL
MCH RBC QN AUTO: 27.8 PG (ref 26.5–33)
MCHC RBC AUTO-ENTMCNC: 30.5 G/DL (ref 31.5–36.5)
MCV RBC AUTO: 91 FL (ref 78–100)
MONOCYTES # BLD AUTO: 0.8 10E9/L (ref 0–1.3)
MONOCYTES NFR BLD AUTO: 8.6 %
NEUTROPHILS # BLD AUTO: 6.8 10E9/L (ref 1.6–8.3)
NEUTROPHILS NFR BLD AUTO: 72.1 %
NRBC # BLD AUTO: 0 10*3/UL
NRBC BLD AUTO-RTO: 0 /100
PHOSPHATE SERPL-MCNC: 3.9 MG/DL (ref 2.5–4.5)
PLATELET # BLD AUTO: 161 10E9/L (ref 150–450)
POTASSIUM SERPL-SCNC: 3.7 MMOL/L (ref 3.4–5.3)
PROCALCITONIN SERPL-MCNC: 1.7 NG/ML
PROT SERPL-MCNC: 5 G/DL (ref 6.8–8.8)
PROT SERPL-MCNC: 5.1 G/DL (ref 6.8–8.8)
RBC # BLD AUTO: 2.91 10E12/L (ref 3.8–5.2)
SODIUM SERPL-SCNC: 142 MMOL/L (ref 133–144)
SPECIMEN SOURCE: ABNORMAL
WBC # BLD AUTO: 9.4 10E9/L (ref 4–11)

## 2017-10-12 PROCEDURE — 25000132 ZZH RX MED GY IP 250 OP 250 PS 637: Mod: GY | Performed by: INTERNAL MEDICINE

## 2017-10-12 PROCEDURE — A9270 NON-COVERED ITEM OR SERVICE: HCPCS | Mod: GY | Performed by: SURGERY

## 2017-10-12 PROCEDURE — 94640 AIRWAY INHALATION TREATMENT: CPT

## 2017-10-12 PROCEDURE — 87070 CULTURE OTHR SPECIMN AEROBIC: CPT | Performed by: INTERNAL MEDICINE

## 2017-10-12 PROCEDURE — 97110 THERAPEUTIC EXERCISES: CPT | Mod: GP | Performed by: PHYSICAL THERAPIST

## 2017-10-12 PROCEDURE — A9270 NON-COVERED ITEM OR SERVICE: HCPCS | Mod: GY | Performed by: NURSE PRACTITIONER

## 2017-10-12 PROCEDURE — 40000275 ZZH STATISTIC RCP TIME EA 10 MIN

## 2017-10-12 PROCEDURE — 93005 ELECTROCARDIOGRAM TRACING: CPT

## 2017-10-12 PROCEDURE — 84100 ASSAY OF PHOSPHORUS: CPT | Performed by: INTERNAL MEDICINE

## 2017-10-12 PROCEDURE — 25000128 H RX IP 250 OP 636: Performed by: INTERNAL MEDICINE

## 2017-10-12 PROCEDURE — 80053 COMPREHEN METABOLIC PANEL: CPT | Performed by: NURSE PRACTITIONER

## 2017-10-12 PROCEDURE — 99233 SBSQ HOSP IP/OBS HIGH 50: CPT | Performed by: INTERNAL MEDICINE

## 2017-10-12 PROCEDURE — 83615 LACTATE (LD) (LDH) ENZYME: CPT | Performed by: NURSE PRACTITIONER

## 2017-10-12 PROCEDURE — 94640 AIRWAY INHALATION TREATMENT: CPT | Mod: 76

## 2017-10-12 PROCEDURE — 99207 ZZC CDG-CODE CATEGORY CHANGED: CPT | Performed by: INTERNAL MEDICINE

## 2017-10-12 PROCEDURE — 94660 CPAP INITIATION&MGMT: CPT

## 2017-10-12 PROCEDURE — 99223 1ST HOSP IP/OBS HIGH 75: CPT | Performed by: INTERNAL MEDICINE

## 2017-10-12 PROCEDURE — 25000125 ZZHC RX 250: Performed by: SURGERY

## 2017-10-12 PROCEDURE — A9270 NON-COVERED ITEM OR SERVICE: HCPCS | Mod: GY | Performed by: INTERNAL MEDICINE

## 2017-10-12 PROCEDURE — 87205 SMEAR GRAM STAIN: CPT | Performed by: INTERNAL MEDICINE

## 2017-10-12 PROCEDURE — 83605 ASSAY OF LACTIC ACID: CPT | Performed by: INTERNAL MEDICINE

## 2017-10-12 PROCEDURE — 97161 PT EVAL LOW COMPLEX 20 MIN: CPT | Mod: GP | Performed by: PHYSICAL THERAPIST

## 2017-10-12 PROCEDURE — 25000132 ZZH RX MED GY IP 250 OP 250 PS 637: Mod: GY | Performed by: NURSE PRACTITIONER

## 2017-10-12 PROCEDURE — 25000132 ZZH RX MED GY IP 250 OP 250 PS 637: Mod: GY | Performed by: SURGERY

## 2017-10-12 PROCEDURE — 25000131 ZZH RX MED GY IP 250 OP 636 PS 637: Mod: GY | Performed by: NURSE PRACTITIONER

## 2017-10-12 PROCEDURE — 85025 COMPLETE CBC W/AUTO DIFF WBC: CPT | Performed by: NURSE PRACTITIONER

## 2017-10-12 PROCEDURE — 40000193 ZZH STATISTIC PT WARD VISIT: Performed by: PHYSICAL THERAPIST

## 2017-10-12 PROCEDURE — 71010 XR CHEST PORT 1 VW: CPT

## 2017-10-12 PROCEDURE — 40000885 ZZH STATISTIC STEP DOWN HRS EVENING

## 2017-10-12 PROCEDURE — 12000007 ZZH R&B INTERMEDIATE

## 2017-10-12 PROCEDURE — 25000125 ZZHC RX 250: Performed by: NURSE PRACTITIONER

## 2017-10-12 PROCEDURE — 00000146 ZZHCL STATISTIC GLUCOSE BY METER IP

## 2017-10-12 PROCEDURE — 93010 ELECTROCARDIOGRAM REPORT: CPT | Performed by: INTERNAL MEDICINE

## 2017-10-12 PROCEDURE — 25000128 H RX IP 250 OP 636: Performed by: NURSE PRACTITIONER

## 2017-10-12 PROCEDURE — 84145 PROCALCITONIN (PCT): CPT | Performed by: NURSE PRACTITIONER

## 2017-10-12 PROCEDURE — 25000128 H RX IP 250 OP 636: Performed by: SURGERY

## 2017-10-12 PROCEDURE — 84155 ASSAY OF PROTEIN SERUM: CPT | Performed by: NURSE PRACTITIONER

## 2017-10-12 PROCEDURE — 85610 PROTHROMBIN TIME: CPT | Performed by: NURSE PRACTITIONER

## 2017-10-12 PROCEDURE — 40000239 ZZH STATISTIC VAT ROUNDS

## 2017-10-12 RX ORDER — WARFARIN SODIUM 2 MG/1
2 TABLET ORAL
Status: COMPLETED | OUTPATIENT
Start: 2017-10-12 | End: 2017-10-12

## 2017-10-12 RX ORDER — FUROSEMIDE 10 MG/ML
40 INJECTION INTRAMUSCULAR; INTRAVENOUS ONCE
Status: COMPLETED | OUTPATIENT
Start: 2017-10-12 | End: 2017-10-12

## 2017-10-12 RX ORDER — METOPROLOL TARTRATE 50 MG
50 TABLET ORAL 2 TIMES DAILY
Status: DISCONTINUED | OUTPATIENT
Start: 2017-10-12 | End: 2017-10-12

## 2017-10-12 RX ORDER — AMIODARONE HYDROCHLORIDE 200 MG/1
200 TABLET ORAL DAILY
Status: DISCONTINUED | OUTPATIENT
Start: 2017-10-13 | End: 2017-10-13

## 2017-10-12 RX ADMIN — HEPARIN SODIUM 5000 UNITS: 5000 INJECTION, SOLUTION INTRAVENOUS; SUBCUTANEOUS at 01:06

## 2017-10-12 RX ADMIN — CHOLECALCIFEROL CAP 125 MCG (5000 UNIT) 5000 UNITS: 125 CAP at 08:23

## 2017-10-12 RX ADMIN — IPRATROPIUM BROMIDE AND ALBUTEROL SULFATE 3 ML: .5; 3 SOLUTION RESPIRATORY (INHALATION) at 08:22

## 2017-10-12 RX ADMIN — DILTIAZEM HYDROCHLORIDE 5 MG/HR: 5 INJECTION INTRAVENOUS at 15:01

## 2017-10-12 RX ADMIN — FUROSEMIDE 40 MG: 10 INJECTION, SOLUTION INTRAVENOUS at 17:29

## 2017-10-12 RX ADMIN — IPRATROPIUM BROMIDE AND ALBUTEROL SULFATE 3 ML: .5; 3 SOLUTION RESPIRATORY (INHALATION) at 14:00

## 2017-10-12 RX ADMIN — HEPARIN SODIUM 5000 UNITS: 5000 INJECTION, SOLUTION INTRAVENOUS; SUBCUTANEOUS at 08:22

## 2017-10-12 RX ADMIN — DILTIAZEM HYDROCHLORIDE 5 MG/HR: 5 INJECTION INTRAVENOUS at 08:26

## 2017-10-12 RX ADMIN — PANTOPRAZOLE SODIUM 40 MG: 40 TABLET, DELAYED RELEASE ORAL at 08:23

## 2017-10-12 RX ADMIN — BUMETANIDE 1 MG: 1 TABLET ORAL at 08:23

## 2017-10-12 RX ADMIN — PENTOXIFYLLINE 400 MG: 400 TABLET, FILM COATED, EXTENDED RELEASE ORAL at 08:22

## 2017-10-12 RX ADMIN — INSULIN ASPART 2 UNITS: 100 INJECTION, SOLUTION INTRAVENOUS; SUBCUTANEOUS at 12:00

## 2017-10-12 RX ADMIN — IPRATROPIUM BROMIDE AND ALBUTEROL SULFATE 3 ML: .5; 3 SOLUTION RESPIRATORY (INHALATION) at 20:50

## 2017-10-12 RX ADMIN — AMIODARONE HYDROCHLORIDE 150 MG: 1.5 INJECTION, SOLUTION INTRAVENOUS at 18:14

## 2017-10-12 RX ADMIN — WARFARIN SODIUM 2 MG: 2 TABLET ORAL at 17:29

## 2017-10-12 RX ADMIN — AMIODARONE HYDROCHLORIDE 0.5 MG/MIN: 50 INJECTION, SOLUTION INTRAVENOUS at 18:32

## 2017-10-12 RX ADMIN — INSULIN ASPART 2 UNITS: 100 INJECTION, SOLUTION INTRAVENOUS; SUBCUTANEOUS at 18:24

## 2017-10-12 RX ADMIN — INSULIN GLARGINE 5 UNITS: 100 INJECTION, SOLUTION SUBCUTANEOUS at 08:22

## 2017-10-12 NOTE — INTERIM SUMMARY
PRIMARY CARE PROVIDER:  Dheeraj Jj      CODE STATUS:  Full code.      DATE OF ADMISSION:  10/02/2017       INTERIM SUMMARY through 10/12/2017      ADMISSION DIAGNOSIS:  Hypoxic respiratory failure secondary to septic shock and pulmonary edema from diastolic heart failure exacerbation.      HOSPITAL COURSE:  Ms. Kathryn Banks is a pleasant 77-year-old female.  She was admitted on 10/02/2017 Dr. Beth Laird from the Intensive Care Service.  The patient was a transfer from the [facility].  She was hospitalized there for concerns for lower extremity wounds and pneumonia, had deteriorated to the point of septic shock and needed an ICU bed.  Unfortunately, their facility did not have any the ICU access at that time, so she was sent here.  Patient's hypotension was significant and she required multiple pressors to include vasopressin and norepinephrine.  This caused a significant insult on her kidneys.  She had already suffered from end-stage renal disease and this also contributed to her volume overload problems as well.  Prior to admission, she was on torsemide 40 mg twice a day.  During her stay in the ICU here, she deteriorated to the point of requiring hemodialysis.  She was started on Zosyn and switched to meropenem and completed an 8-day course of that.  She has also completed a 14-day course of vancomycin.  Vancomycin was extended out to 10/06 because the patient was diagnosed with C. diff colitis back in September.  The patient finished her meropenem course on 10/10.  The patient is on chronic anticoagulation for history of atrial fibrillation.  We have also been having problems with that.  Seems like when she goes through hemodialysis that her atrial fibrillation resurfaces and she goes into rapid ventricular rate.  During her stay as well she suffered from ICU related delirium, also believed to have encephalopathy from her sepsis but that has significantly improved.  She was also  requiring BiPAP for significant portions of her ICU stay, but now she is weaned down to high flow nasal cannula.  Ultimately, in addition to hemodialysis and use of Bumex to help with diuresis, the patient has also had a thoracentesis on 10/11 for 2 liters.  The patient reports to me that she is feeling significantly better.  I reviewed the case with Dr. Portillo, her intensive care doctor today and I agree that she is appropriate for transfer of care to Hospitalist Service and will try to transfer her to the Cardiac Special Care floor today.  Cardiology is still following the patient  because of her recurrent bouts of atrial fibrillation with rapid ventricular rate.  Unfortunately, she is requiring a diltiazem drip right now to keep that under control and Cardiology team ultimately wants an electrophysiology consult for the patient.      ALLERGIES:  Ciprofloxacin causes nausea, vomiting, oxycodone causes delusions and blackouts, lisinopril causes cough, sulfa drugs cause GI disturbances.      HOME MEDICATIONS:   1.  Sodium bicarbonate tablets 650 mg 3 times a day.   2.  Darbepoetin 0.3 mL injection every 14 days as needed to keep hemoglobin above 10.   3.  Ferrous sulfate 325 mg daily.   4.  Mirapex 0.125 mg 3 times a day.   5.  Torsemide 40 mg p.o. b.i.d.   6.  Warfarin 2 mg every other day alternating with 1 mg based on INR.   7.  Calcitriol 0.5 mcg tablet daily.   8.  Calcium carbonate with cholecalciferol 1 tablet 2 times a day.   9.  Acetaminophen 650 q.4 h. p.r.n.   10.  Cyanocobalamin 1 mL injection every 30 days.   11.  Aspirin 81 mg a day.   12.  Nitrostat 0.4 sublingual q.5 p.r.n. for chest pain.   13.  Neurontin 300 mg at bedtime.   14.  Pravastatin 40 mg daily.   15.  Lopressor 25 mg b.i.d.   16.  Norvasc 10 mg daily.   17.  PhosLo 667 mg t.i.d. with meals.   18.  Trental 400 mg tablet daily.   19.  Vancomycin 125 mcg p.o. q.i.d.   20.  Flonase 50 mcg spray daily.   21.  DuoNeb t.i.d.   22.   Lactobacillus 1 tab daily.      PAST MEDICAL HISTORY:   1.  Hypertension.   2.  Atrial flutter.   3.  Paroxysmal atrial fibrillation.   4.  Type 2 diabetes mellitus, hemoglobin A1c of 7.3 in 2017.   5.  Tenosynovitis of the wrist.   6.  Restless legs syndrome.   7.  Hyperlipidemia   8.  Cataracts.   9.  Obstructive sleep apnea on CPAP at home.   10.  Osteoarthritis.   11.  Diastolic congestive heart failure with ejection fraction of 60% to 65%.   12.  Coronary artery disease.   13.  Carpal tunnel syndrome.      PAST SURGICAL HISTORY:   1.  Five-vessel CABG in .   2.  Open reduction and internal fixation, left hip .   3.  Carpal tunnel release.   4.  Unilateral salpingo-oophorectomy.   5.  Cystoscopy .   6.  Vagotomy, pyloroplasty    7.  Abdominal hysterectomy for fibroids .   8.  Bilateral cataract removal.   9.  Facetectomy, foraminotomy .   10.  Appendectomy.      FAMILY HISTORY:  Mother was in good health, is .  Father  from complications related to diabetes, Parkinson's disease DVT that caused a pulmonary embolism after hip fracture.  Strong diabetes in family history with known history of maternal grandmother and paternal grandmother.      SOCIAL HISTORY:  She is a former smoker, smoked a pack a day for about 10 years, quit in .  Does drink a half to 1 beer a month.  No illicit drug history.  She is .  She and her hubby live with her kids.  She is retired as an  with [employer].  They live with family help but they do not live in a nursing home or assisted living.      REVIEW OF SYSTEMS:  A 10-system review conducted with patient, other than those systems listed in history present illness are negative.      PHYSICAL EXAMINATION:   VITAL SIGNS:  Temperature is 99 Fahrenheit taken orally, heart rate is 112-130.  Blood pressure is 111/70 to 85/50, respiratory rate is 14-17, O2 sats are 100% on high flow nasal cannula.    GENERAL:  This is a thin-appearing elderly female.  She is in no apparent distress, alert and oriented x4.   HEENT:  Normocephalic, atraumatic.  No oropharyngeal erythema.   NECK:  No cervical lymphadenopathy, no thyromegaly.   RESPIRATORY:  Good air movement in the right lung throughout.  Left lung is significantly decreased at the base, improved toward the apices.  I do not appreciate any wheezes, rales or rhonchi.  She is not using any respiratory assist muscles.   CARDIOVASCULAR:  Irregularly irregular and rapid, rate varies between 110 to 130.  Normal S1, S2, no S3, S4, no murmurs, rubs or gallops, 2+ pulses are palpable in all 4 extremities.   ABDOMEN:  Normal bowel sounds.  Abdomen is soft, nontender, nondistended.  No hepatosplenomegaly or mass palpable.   EXTREMITIES:  She has no peripheral edema.  She has numerous small lesions in various stages of healing on her bilateral shins.   NEUROLOGIC:  Cranial nerves II-XII are intact.  She has 5/5 strength in bilateral upper extremities.  Lower extremities not assessed.  Sensation is intact diffusely, normal coordination by bilateral finger-nose test.       LABORATORY DATA:  Complete metabolic profile, creatinine is 2.79.  This is a change from 3.68 twenty-four hours ago.  Her GFR is up from 12 to 17 over the past 24 hours.  Her electrolytes are normal except for calcium which is low at 7.4.  Albumin has improved to 2.6 from 1.9 yesterday.  Total protein is low at 5.0, total bilirubin, alkaline phosphatase and liver enzymes are all normal.  Procalcitonin level is improved over the past 48 hours from 4.3 to 1.7.  Free T4 level drawn on 10/11 was 1.59.  TSH level was 5.94.  CBC:  White blood cell count is normal at 9.4, hemoglobin is low at 8.1 but this is an improvement from 6.9 on 10/09/2017, platelets are normal at 161.  Her differential is normal.  Most recent urinalysis was done on 10/01/2017 and it showed greater than 182 white blood cells and a large  amount of leukocyte esterase with also white blood cell clumps and bacteria present.  Positive microbiology.  Sputum culture done on 10/01/2017 grew Candida albicans and coagulase-negative staphylococcus.  C. diff was negative on 10/02/2017.  She had had a positive C. diff on 09/14/2017.  Pleural fluid from 10/11 is still pending as far as culture but pleural fluid Gram stain shows very few white blood cells, no organisms seen.  Urine culture on 10/01/2017 grew nothing.  Blood cultures are negative.        PROCEDURES:  Thoracentesis for 2 liters on 10/11.  Hemodialysis catheter placed on 10/06/2017.      ASSESSMENT:  This is a 77-year-old female with a history of diastolic congestive heart failure, EF 60% to 65%, type 2 non-insulin dependent diabetes mellitus, hemoglobin A1c of 7.6, paroxysmal atrial fibrillation, coronary artery disease, status post 5-vessel coronary artery bypass graft in the remote past who was admitted to the [facility] for infectious disease issues, was transferred to our ICU due to onset of sepsis and respiratory failure.  She is improving and stable enough to transfer from Intensive Care Service to the Hospitalist Service.      PLAN:   1.  Hypoxic respiratory failure.  This is secondary to suspected pneumonia and volume overload issues related to decompensated diastolic congestive heart failure.  This was significant enough to require hemodialysis and thoracentesis for 2 liters.  She has completed a course of meropenem and vancomycin.  From an infectious standpoint, procalcitonin is down trending and there is no need to resume antibiotics.  Her O2 needs are decreasing as well and she has gone from BiPAP to high flow nasal cannula.  We will continue to try to wean this down.  Continue BiPAP at night for obstructive sleep apnea.  Also, requires improvement in her paroxysmal atrial fibrillation with rapid ventricular rate but she is stabilized enough from that standpoint to  transfer out of ICU to [facility].      We will continue with hemodialysis and also with Bumex for diuresis.  The patient is starting to make some of her own urine and last urine output was about 510 mL over the past 24 hours.      2.  Sepsis is believed to be secondary to pneumonia, although her urinalysis was positive on 10/01 but this appears to have been adequately treated.  She is completely weaned off of vasopressors.  Did not require intubation during her stay fortunately.  Now has completed course of antibiotics and procalcitonin is down trending.  Will continue to monitor.  Also to keep in mind patient required a course of vancomycin for C. diff, but last stool on 10/02 was negative.  She remains in enteric isolation at this time as she is still having some diarrhea.       3.  Atrial fibrillation with rapid ventricular rate.  She is chronically anticoagulated on Coumadin.  Pharmacy is helping to dose this.  We also have her on her rate control meds from home, which is low Lopressor.  Right now she is requiring a diltiazem drip and we are up titrating that but I think she is okay for [facility] and Cardiology is following and she will be seen by Electrophysiology later today.      4.  Altered mental status secondary to ICU delirium and encephalopathy related to sepsis.  This is dramatically improved as well.  She is likely going to continue to improve as long as we keep her active with therapies and get her out of the ICU.  Conservative management of pain meds and benzodiazepines in a patient at age 77, would prefer less use of benzodiazepines and would consider Zyprexa or Haldol if necessary and even occasional oral dosing of Seroquel.       5.  History of diabetes mellitus type 2, not on insulin at home.  The patient claims she is on medication for this.  I do not see it on her home medication list.  Right now she is transitioned off an insulin drip.  She had pretty significant hyperglycemia when she came in.  She is on  Lantus 5 units every morning with breakfast and sliding scale insulin with meals.  Her most recent hemoglobin A1c was 7.3 in .  We should check that again as it has been greater than 3 months so I will keep the hemoglobin A1c that has been ordered.  Her glucose tends to spike toward the evening and overnight.  We should probably consider moving the Lantus to bedtime and we need to consider doing carb counting with the short-acting insulin instead of leaving the sliding scale on.  However, at this time her fasting glucoses are in the 120s.  I want to see how she trends today, how she does with dietary intake.  I would be agreeable to move Lantus to bedtime and start carb counting later tonight.      6.  Deep venous thrombosis prophylaxis, anticoagulated with Coumadin.      7.  Code status:  The patient confirms full code.       DISPOSITION:  Likely several more days.  This patient has improved significantly since admission but still has numerous issues.  Her oxygen needs be titrated significantly, also she is still having issues with atrial fibrillation with rapid ventricular rate.  I anticipate she is probably a good candidate for transitional care placement.  I would anticipate no sooner than 3-5 days from now.         EMILIE LIU MD             D: 10/12/2017 09:24   T: 10/12/2017 11:01   MT: [id number]      Name:     MARI HERNANDEZ   MRN:      [id number]        Account:        [id number]   :      1940           Admit Date:     [id number]                                  Discharge Date:       Document: [id number]       cc: Dheeraj Jj MD

## 2017-10-12 NOTE — PROGRESS NOTES
Assessment and Plan:   ESRD: scheduled for HD in am. I/O yest 1272/765 UO and UF on HD. BP is low and got IV alb for BP support yesterday. She is on oral bumex.  On metoprolol and dilt drip for afib.             Interval History:   Afib:  Coumadin. S/P CABG in past.    Pneumonia/resp failure:  bipap and NC O2.               Review of Systems:   No chest or abd pain, taking po well, Not SOB at the moment.          Medications:       insulin aspart  1-7 Units Subcutaneous TID AC     insulin aspart  1-5 Units Subcutaneous At Bedtime     metoprolol  50 mg Oral BID     warfarin  2 mg Oral ONCE at 18:00     bumetanide  1 mg Oral BID     pantoprazole  40 mg Oral QAM     insulin glargine  5 Units Subcutaneous QAM AC     ipratropium - albuterol 0.5 mg/2.5 mg/3 mL  1 vial Nebulization TID     pentoxifylline  400 mg Oral Daily     sodium chloride (PF)  10 mL Intracatheter Q8H     cholecalciferol  5,000 Units Oral Daily     heparin  5,000 Units Subcutaneous Q8H     - MEDICATION INSTRUCTIONS for Dialysis Patients -   Does not apply See Admin Instructions       diltiazem (CARDIZEM) infusion ADULT 5 mg/hr (10/12/17 0826)     Warfarin Therapy Reminder       IV fluid REPLACEMENT ONLY       Current active medications and PTA medications reviewed, see medication list for details.            Physical Exam:   Vitals were reviewed  Patient Vitals for the past 24 hrs:   BP Temp Temp src Heart Rate Resp SpO2   10/12/17 0836 (!) 85/50 - - 112 - -   10/12/17 0822 - - - - - 100 %   10/12/17 0800 - - - - 14 -   10/12/17 0745 111/70 - - 124 17 100 %   10/12/17 0730 91/62 - - 118 8 100 %   10/12/17 0715 108/65 - - 113 11 99 %   10/12/17 0700 95/60 - - 125 22 100 %   10/12/17 0600 101/64 - - - - 100 %   10/12/17 0545 (!) 69/58 - - - - 100 %   10/12/17 0530 94/81 - - - - 97 %   10/12/17 0515 100/67 - - 122 - 100 %   10/12/17 0500 103/65 - - 122 - 100 %   10/12/17 0445 105/78 - - 114 - 100 %   10/12/17 0430 109/67 - - 131 - 100 %    10/12/17 0415 101/66 - - 131 - 100 %   10/12/17 0400 109/67 99  F (37.2  C) Oral 122 - 100 %   10/12/17 0345 91/68 - - 114 - 100 %   10/12/17 0330 106/64 - - 85 - 100 %   10/12/17 0315 96/64 - - 129 - 100 %   10/12/17 0300 96/57 - - 130 - 100 %   10/12/17 0245 104/57 - - 123 - 100 %   10/12/17 0230 (!) 86/48 - - 117 - 100 %   10/12/17 0215 (!) 86/55 - - 109 - 99 %   10/12/17 0200 102/59 - - 124 - 96 %   10/12/17 0145 101/53 - - 125 - 98 %   10/12/17 0130 100/56 - - 131 - 96 %   10/12/17 0115 109/71 - - 115 - 96 %   10/12/17 0100 101/76 - - 112 - 96 %   10/12/17 0045 103/64 - - 130 - 98 %   10/12/17 0030 97/61 - - 122 - 97 %   10/12/17 0015 101/61 - - 129 - 98 %   10/12/17 0000 102/54 98.9  F (37.2  C) Oral 118 - 98 %   10/11/17 2357 - - - - - 98 %   10/11/17 2345 100/62 - - 124 - 98 %   10/11/17 2330 105/68 - - 126 - 95 %   10/11/17 2315 104/57 - - 102 - 98 %   10/11/17 2300 103/64 - - 106 - 97 %   10/11/17 2245 107/63 - - 118 - 99 %   10/11/17 2230 94/59 - - - - 99 %   10/11/17 2215 99/58 - - - - 99 %   10/11/17 2200 99/54 - - 105 21 100 %   10/11/17 2145 92/56 - - 118 - 99 %   10/11/17 2130 95/62 - - - - 100 %   10/11/17 2115 (!) 84/49 - - 116 23 100 %   10/11/17 2100 (!) 89/49 - - - - -   10/11/17 2045 (!) 88/50 - - 126 10 99 %   10/11/17 2036 - - - - - 98 %   10/11/17 2030 (!) 80/51 - - 123 - 98 %   10/11/17 2015 (!) 73/44 - - 116 - 98 %   10/11/17 2000 (!) 87/47 99.1  F (37.3  C) - - - 99 %   10/11/17 1945 - - - 98 - -   10/11/17 1930 103/54 - - 101 - 97 %   10/11/17 1915 (!) 88/56 - - 124 - 99 %   10/11/17 1905 93/67 - - 109 10 98 %   10/11/17 1900 (!) 84/49 - - 107 - 99 %   10/11/17 1845 - - - 120 - -   10/11/17 1830 99/69 - - 121 - 96 %   10/11/17 1815 91/65 - - 113 18 99 %   10/11/17 1800 114/63 - - 106 15 100 %   10/11/17 1745 107/49 - - 104 18 98 %   10/11/17 1730 100/63 - - 103 20 99 %   10/11/17 1715 94/64 - - 95 27 98 %   10/11/17 1700 98/66 - - 122 8 99 %   10/11/17 1645 95/61 - - 129 25 98 %    10/11/17 1630 101/76 - - 131 26 95 %   10/11/17 1615 118/64 - - 131 20 94 %   10/11/17 1600 96/71 - - 142 23 100 %   10/11/17 1558 - - - - - 99 %   10/11/17 1530 (!) 89/53 - - 131 26 92 %   10/11/17 1515 (!) 84/56 - - 115 24 99 %   10/11/17 1500 90/56 - - 115 17 100 %   10/11/17 1445 100/56 - - 118 19 98 %   10/11/17 1430 (!) 89/60 - - 138 14 98 %   10/11/17 1415 107/69 - - 123 8 99 %   10/11/17 1410 95/71 - - 128 15 99 %   10/11/17 1400 (!) 88/53 - - 130 18 98 %   10/11/17 1345 110/76 - - 122 16 97 %   10/11/17 1330 102/62 98  F (36.7  C) Oral 141 - 100 %   10/11/17 1315 112/70 - - 141 20 99 %   10/11/17 1300 107/68 - - 142 17 100 %   10/11/17 1245 111/73 - - 141 17 100 %   10/11/17 1240 109/71 - - 141 22 100 %   10/11/17 1230 103/70 - - 146 14 100 %   10/11/17 1220 107/67 - - 144 18 99 %   10/11/17 1215 94/62 - - 144 18 97 %   10/11/17 1210 (!) 87/57 - - 138 (!) 39 (!) 89 %   10/11/17 1206 - - - - - 98 %   10/11/17 1200 (!) 85/62 - - 141 8 100 %   10/11/17 1155 (!) 77/48 - - 137 (!) 33 97 %   10/11/17 1150 (!) 68/45 - - 140 14 100 %   10/11/17 1145 (!) 82/56 - - 137 12 100 %   10/11/17 1130 (!) 88/63 - - 142 24 100 %   10/11/17 1126 105/68 97.6  F (36.4  C) Axillary 141 19 -   10/11/17 1115 99/69 - - 142 18 -   10/11/17 1113 102/69 - - 140 27 -       Temp:  [97.6  F (36.4  C)-99.1  F (37.3  C)] 99  F (37.2  C)  Heart Rate:  [] 112  Resp:  [8-39] 14  BP: ()/(44-81) 85/50  FiO2 (%):  [70 %-90 %] 90 %  SpO2:  [89 %-100 %] 100 %    Temperatures:  Current - Temp: 99  F (37.2  C); Max - Temp  Av.5  F (36.9  C)  Min: 97.6  F (36.4  C)  Max: 99.1  F (37.3  C)  Respiration range: Resp  Av.5  Min: 8  Max: 39  Pulse range: No Data Recorded  Blood pressure range: Systolic (24hrs), Av , Min:68 , Max:118   ; Diastolic (24hrs), Av, Min:44, Max:81    Pulse oximetry range: SpO2  Av.5 %  Min: 89 %  Max: 100 %    I/O last 3 completed shifts:  In: 1371.5 [P.O.:480; I.V.:391.5]  Out: 510  [Urine:510]      Intake/Output Summary (Last 24 hours) at 10/12/17 1102  Last data filed at 10/12/17 0840   Gross per 24 hour   Intake           1621.5 ml   Output              425 ml   Net           1196.5 ml       Alert, responsive  On NC O2  R CVC with no redness or tenderness       Wt Readings from Last 4 Encounters:   10/10/17 70.8 kg (156 lb 1.4 oz)   10/01/17 77.3 kg (170 lb 8 oz)   09/22/17 77.1 kg (170 lb)   09/21/17 76.2 kg (168 lb)          Data:          Lab Results   Component Value Date     10/12/2017     10/11/2017     10/10/2017    Lab Results   Component Value Date    CHLORIDE 107 10/12/2017    CHLORIDE 110 10/11/2017    CHLORIDE 109 10/10/2017    Lab Results   Component Value Date    BUN 12 10/12/2017    BUN 18 10/11/2017    BUN 12 10/10/2017      Lab Results   Component Value Date    POTASSIUM 3.7 10/12/2017    POTASSIUM 3.7 10/11/2017    POTASSIUM 3.6 10/10/2017    Lab Results   Component Value Date    CO2 29 10/12/2017    CO2 28 10/11/2017    CO2 29 10/10/2017    Lab Results   Component Value Date    CR 2.79 10/12/2017    CR 3.68 10/11/2017    CR 2.80 10/10/2017        Recent Labs   Lab Test  10/12/17   0456  10/11/17   0420  10/10/17   0409   WBC  9.4  8.4  9.3   HGB  8.1*  8.6*  8.5*   HCT  26.6*  27.8*  27.5*   MCV  91  90  90   PLT  161  152  129*     Recent Labs   Lab Test  10/12/17   0456  10/11/17   0420  10/10/17   0409   AST  9  12  12   ALT  8  9  9   ALKPHOS  128  145  147   BILITOTAL  0.4  0.3  0.3       Recent Labs   Lab Test  10/10/17   0409  10/09/17   0441  10/08/17   0411   MAG  1.7  2.1  2.0     Recent Labs   Lab Test  10/10/17   0409  10/09/17   0441  10/08/17   0411   PHOS  2.0*  3.4  3.1     Recent Labs   Lab Test  10/12/17   0456  10/11/17   0420  10/10/17   0409   MALICK  7.4*  7.8*  7.7*       Lab Results   Component Value Date    MALICK 7.4 (L) 10/12/2017     Lab Results   Component Value Date    WBC 9.4 10/12/2017    HGB 8.1 (L) 10/12/2017    HCT 26.6 (L)  10/12/2017    MCV 91 10/12/2017     10/12/2017     Lab Results   Component Value Date     10/12/2017    POTASSIUM 3.7 10/12/2017    CHLORIDE 107 10/12/2017    CO2 29 10/12/2017     (H) 10/12/2017     Lab Results   Component Value Date    BUN 12 10/12/2017    CR 2.79 (H) 10/12/2017     Lab Results   Component Value Date    MAG 1.7 10/10/2017     Lab Results   Component Value Date    PHOS 2.0 (L) 10/10/2017       Creatinine   Date Value Ref Range Status   10/12/2017 2.79 (H) 0.52 - 1.04 mg/dL Final   10/11/2017 3.68 (H) 0.52 - 1.04 mg/dL Final   10/10/2017 2.80 (H) 0.52 - 1.04 mg/dL Final   10/09/2017 3.98 (H) 0.52 - 1.04 mg/dL Final   10/08/2017 3.28 (H) 0.52 - 1.04 mg/dL Final   10/07/2017 2.93 (H) 0.52 - 1.04 mg/dL Final       Attestation:  I have reviewed today's vital signs, notes, medications, labs and imaging.     Hank Guerra MD

## 2017-10-12 NOTE — PROGRESS NOTES
[facility]    ICU Progress Note    Assessment & Plan   Kathryn Banks is a 77 year old female who was admitted on 10/2/2017 from an outside hospital for evaluation and management of sepsis likely secondary to pneumonia. Upon arrival she was requiring vasopressor support, which has been weaned off. Intermittent hemodialysis was initiated on 10/2/2017 out of concern for acute on chronic renal failure, hyperkalemia, and metabolic acidosis. Her oxygenation has improved and she is currently on HF nasal cannula. Chest CT on 10/10/2017 noted continued bilateral pleural effusions, left greater than right.     Main plan for the day  - wean O2  - PT/OT  -  Lopressor PO      Active Problems:  Acute respiratory failure, improving  Obstructive sleep apnea, treated  Pneumonia, treated  Pleural effusion, bilateral, left greater than right  HFpEF  Atrial fibrillation, paroxysmal   End-stage renal disease, now requiring dialysis     Neurologic:  #1 Altered mental status, likely multifactorial encephalopathy versus delirium, resolving  Initially with altered mental status and has improved. Alert, oriented, very hard of hearing.   Plan:  - encourage sleep/wake cycle  - avoid medications known to cause delirium; Tylenol as needed for pain, avoid opiates  - redirect, as needed     Pulmonary:  #1 Acute respiratory failure, likely secondary to pneumonia versus pulmonary edema secondary to decompensated heart failure, improved   #2 Obstructive sleep apnea, treated  #3 Pneumonia, treated  #4 Pleural effusion, left  Has required BiPap intermittently, now on HF nasal cannula. Was on flat-bedrest for several days due to femoral line and has had atelectasis on chest Xray. Chest Xray 10/9/2017 with persistent but slightly improved bilateral alveolar pulmonary infiltrates and slightly worse moderate left pleural effusion. Chest Xray at outside facility was notable for concern of pneumonia. Currently afebrile, white count is  normal, Procalcitonin on 10/7/2017 0.75. Was initially started on empiric IV Vancomycin and Zosyn, now completed meropenem course  Plan:  - continue HF nasal cannula  - encourage BiPap during sleep as she has as history of ANABEL  - highly encourage pulmonary hygiene, up to chair as tolerated  - scheduled and PRN nebs      Cardiovascular:  #1 Heart failure, with preserved ejection fraction, secondary to ischemic heart disease  #2 Atrial fibrillation, paroxysmal  #3 Status post ablation for atrial fibrillation, June 2017  Echocardiogram 10/2/2017 with unchanged EF of 60-65%, pulmonary hypertension, diastolic dysfunction, and mild-moderately reduced RV function. On admission Chest Xray was concerning for pulmonary edema; peripheral edema was present on admission suggesting decompensated heart failure, which could have contributed to her respiratory status. Cardiology has been consulted with recommendation to consider ischemic work-up as an outpatient. She has intermittently been in atrial fibrillation since 10/4/2017; currently in sinus rhythm. Many rhythm changes have been with dialysis. She required diltiazem drip for rate control on 10/4/2017, 10/11 post thor and fluid shift . Now on oral metoprolol.  Plan:  - continue Metoprolol, amlodipine, and Bumex  - replete electrolytes, as needed  - increase metoprolol for rate control,   - wean dilt drip   - Warfarin resumed 10/9/2017 and held 10/10/2017 for thoracentesis (CHADS-VASc2 4)    Renal:  #1 End-stage renal disease, now on dialysis  #2 Oliguria  #3 Hypokalemia, treated  #4 Hypomagnesemia, treated  She was not requiring dialysis prior to this admission, likely now requiring dialysis secondary to hypovolemia and hypotension present on admission. Intermittent hemodialysis initiated 10/2/2017, RIJ tunneled line placed 10/6/2017. Greatly appreciate Nephrology recommendations. Urine output has improved with Bumex. UOP 10/10/2017 1130. Coleman in-place. Encouraging she is  making urine.  Plan:  - dialysis per Nephrology   - continue Bumex 2- mg every 8- hours  - replete electrolytes as needed  - continue Cee for accurate I/O    Infectious Disease   #1 Pneumonia, treated  #2 Personal history of clostridium difficile, treated  Chest Xray on 10/5/2017 was concerning for worsening respiratory status and Zosyn was switched to Meropenem. She was diagnosed with C. Dif at [facility] on 9/14/2017 and initially treated with PO metronidazole without response and transitioned to PO Vancomycin on 9/22/2017. The 14- days course of Vancomycin was complete on 10/6/2017, negative PCR this admission. Procal 10/10/2017 4.32; however no clinical signs of infection. 8- day course of Meropenem completed 10/10/2017, currently not on antibiotics. Procal downtrending   Plan:  - still have Cee and need to continue for accurate I/O with renal failure  - follow off abx    GI/Nutrition  #1 Altered nutrition, secondary to critical illness  Plan:  - continue dialysis diet with Nepro supplement     Endocrine  #1 Hyperglycemia, secondary to critical illness  Does not have a history of diabetes.   Plan:  - finger stick every 4- hours with correction scale to keep glucose less than 150  - added basal insulin    Musculoskeletal  #1 Bilateral lower extremity wounds, likely venous  She states she had a full work-up at [facility]. Wounds have been present for 2- months and she states they are improving.   Plan:  - continue local wound care    ICU Prophylaxis:  DVT: SQ Heparin  Restaints: No     Code Status: Full Code    Disposition: Expected floor, IMC -signed out to hospitalist.       Agustin Portillo,   L3      Interval History   - HD yesterday  As well thor with 1200 ml removed - post rapid afib, bolused albumin x 3 as well dilt drip started. Marked improvement on imaging this AM , Oxygenation improving as well        -Data reviewed today: I reviewed all new labs and imaging results over the last 24 hours. I personally  reviewed .    Physical Exam   Temp: 99  F (37.2  C) Temp src: Oral BP: 111/70   Heart Rate: 124 Resp: 14 SpO2: 100 % O2 Device: High Flow Nasal Cannula (HFNC) Oxygen Delivery: Other (Comments) (30LPM)  Vitals:    10/07/17 0743 10/08/17 0630 10/10/17 0600   Weight: 70.5 kg (155 lb 6.8 oz) 68.8 kg (151 lb 10.8 oz) 70.8 kg (156 lb 1.4 oz)     Vital Signs with Ranges  Temp:  [97.6  F (36.4  C)-99.1  F (37.3  C)] 99  F (37.2  C)  Heart Rate:  [] 124  Resp:  [8-51] 14  BP: ()/(44-81) 111/70  FiO2 (%):  [70 %-90 %] 70 %  SpO2:  [89 %-100 %] 100 %  I/O last 3 completed shifts:  In: 1131.5 [P.O.:240; I.V.:391.5]  Out: 510 [Urine:510]    General: Appears stated age, no acute distress. [place]   Skin:  Warm, dry. No rashes or lesions on exposed skin.  HEENT:  Normocephalic, atraumatic.  Chest: No increased work of breathing. Does require supplemental, sleeping on hiflo . Bilateral anterior lung sounds coarse.   Cardiovascular:  Sinus rhythm. Extremities appear well-perfused.  S1, S2 irregular rhythm, increased rate  Abdomen:  Soft, non-tender, non-distended.   Musculoskeletal:  Moves all four extremities. Bilateral lower extremity wounds appear non-infected.  Neurological:  Alert and oriented x 4. Cranial nerves II-XII grossly intact.   Psychiatric:  Affect and mood congruent.    Medications     diltiazem (CARDIZEM) infusion ADULT 5 mg/hr (10/11/17 2021)     Warfarin Therapy Reminder       IV fluid REPLACEMENT ONLY         insulin aspart  1-7 Units Subcutaneous TID AC     insulin aspart  1-5 Units Subcutaneous At Bedtime     bumetanide  1 mg Oral BID     pantoprazole  40 mg Oral QAM     insulin glargine  5 Units Subcutaneous QAM AC     ipratropium - albuterol 0.5 mg/2.5 mg/3 mL  1 vial Nebulization TID     pentoxifylline  400 mg Oral Daily     sodium chloride (PF)  10 mL Intracatheter Q8H     cholecalciferol  5,000 Units Oral Daily     heparin  5,000 Units Subcutaneous Q8H     - MEDICATION INSTRUCTIONS for Dialysis  Patients -   Does not apply See Admin Instructions       Data     Recent Labs  Lab 10/12/17  0456 10/11/17  0420 10/10/17  0409   WBC 9.4 8.4 9.3   HGB 8.1* 8.6* 8.5*   MCV 91 90 90    152 129*   INR 1.21* 1.08 0.98    145* 143   POTASSIUM 3.7 3.7 3.6   CHLORIDE 107 110* 109   CO2 29 28 29   BUN 12 18 12   CR 2.79* 3.68* 2.80*   ANIONGAP 6 7 5   MALICK 7.4* 7.8* 7.7*   * 139* 147*   ALBUMIN 2.6* 1.9* 2.0*   PROTTOTAL 5.0* 4.9* 5.0*   BILITOTAL 0.4 0.3 0.3   ALKPHOS 128 145 147   ALT 8 9 9   AST 9 12 12       Imaging:   Recent Results (from the past 24 hour(s))   US Thoracentesis Portable    Narrative    ULTRASOUND GUIDED THORACENTESIS October 11, 2017 12:39 PM     HISTORY: Left pleural effusion.    FINDINGS: Ultrasound was used to evaluate for the presence and best  approach for drainage of a pleural effusion. Written and oral informed  consent was obtained. A pause for the cause procedure to verify the  correct patient and correct procedure. The skin overlying the left  chest posteriorly was prepped and draped in the usual sterile fashion.  The subcutaneous tissues were anesthetized with 5mL 1% lidocaine. A  catheter was advanced into the pleural space and 1250 mL of  [place]  colored fluid was drained. Patient was monitored by nurse under my  direct supervision throughout the exam. Ultrasound images were  permanently stored. There were no immediate complications. Patient  left the ultrasound suite in satisfactory condition.      Impression    IMPRESSION: Technically successful thoracentesis without immediate  complications.    BARRY TAO MD

## 2017-10-12 NOTE — PROGRESS NOTES
[facility]  Cardiology Progress Note  Date of Service: 10/12/2017     Assessment & Plan    Kathryn Banks is a 77 year old female with past medical history significant for CAD s/p CABG x 5, atrial flutter s/p ablation and DCCV 6/2017. Admitted on 10/2/2017 with sepsis and pneumonia with PAF RVR.     Assessment:  1.  Paroxysmal Atrial fibrillation  - rhythm changes coincide with dialysis runs   - rates 120-130s with sbps   - Echo EF 60-65%, pulmonary HTN, diastolic dysfunction, mild-mod reduced RV function  - CHADsVASC 5 age, gender, HTN, DM   - likely from stress of sepsis and pneumonia   - coumadin for anticoagulation  - metoprolol discontinued to make more BP room for rate control   - dilt gtt 5mL/hr with soft BPs    2. S/P atrial flutter ablation   - DCCV 6/2017    3. CAD   - s/p CABG  - 2007 graft to PL occluded and 4 other grafts patent  - no angina     4. ESRD   - fluid overload with pleural effusions   - Lt thoracentesis with 1250 mL removed 10/11  - CXR 10/12 showing significant improvement in pulm edema and lt effusion improved post thoracentesis   - dialysis started 10/2  - per nephrology switching iv bumex to PO     5. Respiratory failure   - managed by intensivist   - high flow nasal cannula/ BIPAP      Plan:     1. Continue diltiazem gtt for rate control       Cyn Shelton  2:31 PM 10/9/2017   [facility]  Pager: [phone]      Interval History   Atrial fibrillation RVR at 120-130s asymptomatic. She is on bipap intermittently with high flow nasal cannula.  Does have some SOB with respiratory failure and on oxygen currently. Also pleural effusion with thoracentesis yesterday, on dialysis and PO bumex.     Physical Exam   Temp: 99  F (37.2  C) Temp src: Oral BP: 111/70   Heart Rate: 124 Resp: 14 SpO2: 100 % O2 Device: High Flow Nasal Cannula (HFNC) Oxygen Delivery: Other (Comments) (30LPM)  Vitals:    10/07/17 0743 10/08/17 0630 10/10/17 0600   Weight: 70.5 kg (155 lb  6.8 oz) 68.8 kg (151 lb 10.8 oz) 70.8 kg (156 lb 1.4 oz)       Constitutional   alert and oriented, in no acute distress.  Skin   warm and dry to touch  ENT   no pallor or cyanosis  Neck  JVP difficult to assess   Chest   no tenderness to palpation   Lungs Decreased lung sounds bilaterally. No wheezes, rales or rhonchi  Cardiac RRR, S1 normal, S2 normal, No S3 or S4, no murmurs, no rubs  Abdomen   abdomen soft  Extremities and Back   no clubbing, cyanosis. No edema.  2 + radial pulse bilaterally  Neurological   no gross motor deficits noted, affect appropriate, oriented to time, person and place.    Medications     diltiazem (CARDIZEM) infusion ADULT 5 mg/hr (10/11/17 2021)     Warfarin Therapy Reminder       IV fluid REPLACEMENT ONLY         insulin aspart  1-7 Units Subcutaneous TID AC     insulin aspart  1-5 Units Subcutaneous At Bedtime     metoprolol  50 mg Oral BID     bumetanide  1 mg Oral BID     pantoprazole  40 mg Oral QAM     insulin glargine  5 Units Subcutaneous QAM AC     ipratropium - albuterol 0.5 mg/2.5 mg/3 mL  1 vial Nebulization TID     pentoxifylline  400 mg Oral Daily     sodium chloride (PF)  10 mL Intracatheter Q8H     cholecalciferol  5,000 Units Oral Daily     heparin  5,000 Units Subcutaneous Q8H     - MEDICATION INSTRUCTIONS for Dialysis Patients -   Does not apply See Admin Instructions       Data   Most Recent 3 CBC's:  Recent Labs   Lab Test  10/12/17   0456  10/11/17   0420  10/10/17   0409   WBC  9.4  8.4  9.3   HGB  8.1*  8.6*  8.5*   MCV  91  90  90   PLT  161  152  129*     Most Recent 3 BMP's:  Recent Labs   Lab Test  10/12/17   0456  10/11/17   0420  10/10/17   0409   NA  142  145*  143   POTASSIUM  3.7  3.7  3.6   CHLORIDE  107  110*  109   CO2  29  28  29   BUN  12  18  12   CR  2.79*  3.68*  2.80*   ANIONGAP  6  7  5   MALICK  7.4*  7.8*  7.7*   GLC  162*  139*  147*   Most Recent 3 INR's:  Recent Labs   Lab Test  10/12/17   0456  10/11/17   0420  10/10/17   0409   INR  1.21*   1.08  0.98   Most Recent 3 Troponin's:  Recent Labs   Lab Test  08/26/17   0637  07/11/17   1604  07/11/17   0940   02/22/14   0024   TROPI  <0.015  0.022  0.022   < >   --    TROPONIN   --    --    --    --   0.01    < > = values in this interval not displayed.   Most Recent 3 BNP's:  Recent Labs   Lab Test  09/22/17   0818  09/13/17   1849  09/06/17   0741  08/26/17   0637  08/12/17   1013   01/22/13   1031   NTBNPI   --   56091*   --   07535*  46651*   < >   --    NTBNP  06524*   --   05627*   --    --    --   1280*    < > = values in this interval not displayed.   Most Recent Cholesterol Panel:  Recent Labs   Lab Test  04/12/16   1132   CHOL  127   LDL  47   HDL  65   TRIG  73   Most Recent TSH and T4:  Recent Labs   Lab Test  10/11/17   0420   TSH  5.94*   T4  1.59*       Last 24 hours labs reviewed   EKG: (reviewed personally) 10/4/17 Atrial fibrillation vent rate 147    Imaging:   CXR 10/9/17 0610       IMPRESSION: Persistent bilateral alveolar pulmonary infiltrates,  slightly improved. Moderate left pleural effusion is slightly worse.  Normal heart size. Sternal wires. Catheter from right subclavian  approach with the tip in the upper right atrium. PICC line from the  right with the tip at the cavoatrial junction.    CXR 10/12/17  IMPRESSION: Interval left-sided thoracentesis has resulted in  significant decrease in left pleural effusion. There is also  significant improvement in pulmonary edema pattern since the prior  exam. Previous midline sternotomy and central venous catheters or  ports remain. Heart size is borderline prominent.    Tele: A fib 120-130s    Echo:   10/3   Interpretation Summary     The visual ejection fraction is estimated at 60-65%.  Normal left ventricular wall motion  Left ventricular diastolic function is indeterminate.  The right ventricle is mild to moderately dilated.  The right ventricular systolic function is mild to moderately reduced.  The left atrium is moderately  dilated.  The right ventricular systolic pressure is approximated at 42mmHg plus the  right atrial pressure.  Normal IVC (1.5-2.5cm) with <50% respiratory collapse; right atrial pressure  is estimated at 10-15mmHg.  Moderate (46-55mmHg) pulmonary hypertension is present.  Moderate left pleural effusion  Sinus rhythm was noted.  Compared to prior study, there is no significant change.

## 2017-10-12 NOTE — PLAN OF CARE
"Problem: Patient Care Overview  Goal: Plan of Care/Patient Progress Review  PT-Patient seen for initial eval and treatment provided. Nurse requested to just do ex in bed due to afib and soft BP. Patient lives in a house with her , granddaughter and 2 friends. Patient reports \"we help them and they help us.\" Patient lives on main level and is independent with bed mobility, transfers and with amb with ww. Uses a ww inside and outside. Reports she hasn't gone away from the house much in a year because she is considered home bound as she has been getting home care for a year.   Discharge Planner PT   Patient plan for discharge: Plans on rehab  Current status: Patient very motivated and participated well with LE ex. LE strength is good bilateral LE's but generalized weakness due to illness and prolonged bedrest. Strength is decreased in left UE and patients reports pain with left shoulder flexion. She uses right UE to lift left LE. She reports her left arm wasn't weak or painful prior to admission.   Barriers to return to prior living situation: Currently, generalized weakness will limit independece  Recommendations for discharge: TCU  Rationale for recommendations: Patient would benefit from continued skilled PT to progress her strength and functional endurance prior to discharge to home.        Entered by: Inga Liz 10/12/2017 11:23 AM         "

## 2017-10-12 NOTE — PLAN OF CARE
Problem: Patient Care Overview  Goal: Plan of Care/Patient Progress Review  OT: Patient transferring to 33 this afternoon. Rescheduling OT eval for tomorrow.

## 2017-10-12 NOTE — PROGRESS NOTES
Pt ok to transfer to station 33 per MD.  Transported on 10L oxymask, on monitor w/ flying squad.  Belongings accounted for and brought w/ pt.  Report given to ROSARIO Pathak.

## 2017-10-12 NOTE — PROGRESS NOTES
" 10/12/17 1045   Quick Adds   Type of Visit Initial PT Evaluation   Living Environment   Lives With grandchild(zoe);spouse  (Granddaughter is an adult)   Living Arrangements house   Home Accessibility bed and bath on same level   Number of Stairs to Enter Home 0   Number of Stairs Within Home 0   Living Environment Comment Patient lives with her  and granddaughter and 2 friends. She reports \"We are helping them amd they are helping us.\"   Self-Care   Dominant Hand right   Usual Activity Tolerance good   Current Activity Tolerance poor   Regular Exercise no   Functional Level Prior   Ambulation 1-->assistive equipment   Transferring 1-->assistive equipment   Toileting 1-->assistive equipment   Bathing 1-->assistive equipment   Dressing 1-->assistive equipment   Eating 0-->independent   Communication 0-->understands/communicates without difficulty   Swallowing 0-->swallows foods/liquids without difficulty   Cognition 0 - no cognition issues reported   Fall history within last six months no   Prior Functional Level Comment Patient denied falls.    General Information   Onset of Illness/Injury or Date of Surgery - Date 10/02/17   Referring Physician Magalie Chavez PA-C   Patient/Family Goals Statement TCU   Pertinent History of Current Problem (include personal factors and/or comorbidities that impact the POC) Patient admitted with sepsis likely secondary to pneumonia. She is currently on high flow oxygen.    Precautions/Limitations fall precautions   Weight-Bearing Status - LUE full weight-bearing   Weight-Bearing Status - RUE full weight-bearing   Weight-Bearing Status - LLE full weight-bearing   Weight-Bearing Status - RLE full weight-bearing   General Observations Patient very motivated. Left LE is shorter than right from old left hip fx.    Cognitive Status Examination   Orientation orientation to person, place and time   Level of Consciousness alert   Follows Commands and Answers Questions 100% of the time "   Personal Safety and Judgment intact   Cognitive Comment ? memory as some of the information she provided is different than what is in the chart (ie. she denies falls and chart says she had 2 in last 6 monthe   Pain Assessment   Patient Currently in Pain No  (Denied.)   Integumentary/Edema   Integumentary/Edema Comments Bialteral lower legs very kailash, noted scabs on both legs   Posture    Posture Comments Did not assess as patient remained supine.    Range of Motion (ROM)   ROM Comment Left shoulder painful with flexion greater than about 80 degrees. Can get to 135.    Strength   Strength Comments Able to lift bilateral LE's off bed against gravity. Left shoulder weak-unable to lift it off the bed without assist due to weakness and pain.    Bed Mobility   Bed Mobility Comments Did not assess as patients BP low and afib   Sensory Examination   Sensory Perception no deficits were identified   Coordination   Coordination no deficits were identified   Muscle Tone   Muscle Tone no deficits were identified   General Therapy Interventions   Planned Therapy Interventions bed mobility training;gait training;strengthening;transfer training   Clinical Impression   Criteria for Skilled Therapeutic Intervention yes, treatment indicated   PT Diagnosis impaired general strength due to illness and bedrest.   Influenced by the following impairments left UE weak and painful, unable to assess mobility due to afib and soft BP   Functional limitations due to impairments Unable to assess function due to afib and soft BP   Clinical Presentation Evolving/Changing   Clinical Presentation Rationale Patient currently in afib with soft BP   Clinical Decision Making (Complexity) Moderate complexity   Therapy Frequency` daily   Predicted Duration of Therapy Intervention (days/wks) 5   Anticipated Equipment Needs at Discharge (Has a ww already. )   Anticipated Discharge Disposition Transitional Care Facility   Risk & Benefits of therapy have  "been explained Yes   Patient, Family & other staff in agreement with plan of care Yes   [facility] AM-PAC TM \"6 Clicks\"   2016, Trustees of [facility], under license to [employer].  All rights reserved.   6 Clicks Short Forms Basic Mobility Inpatient Short Form   [facility] AM-PAC  \"6 Clicks\" V.2 Basic Mobility Inpatient Short Form   1. Turning from your back to your side while in a flat bed without using bedrails? 3 - A Little   2. Moving from lying on your back to sitting on the side of a flat bed without using bedrails? 3 - A Little   3. Moving to and from a bed to a chair (including a wheelchair)? 3 - A Little   4. Standing up from a chair using your arms (e.g., wheelchair, or bedside chair)? 3 - A Little   5. To walk in hospital room? 2 - A Lot   6. Climbing 3-5 steps with a railing? 2 - A Lot   Basic Mobility Raw Score (Score out of 24.Lower scores equate to lower levels of function) 16   Total Evaluation Time   Total Evaluation Time (Minutes) 10     "

## 2017-10-12 NOTE — PROVIDER NOTIFICATION
Brief ICU Note    S: Was notified by nursing staff for persistent hypotension with systolic BP 73-89, diastolic BP 44-51. She developed hypotension after her run of dialysis earlier today and has since received albumin 25% 25g x2 doses. She is asymptomatic from her hypotension and denies any lightheadedness or dizziness.    O: On exam, she is resting comfortably in bed in no acute distress. Mucous membranes moist. Tachycardic, no murmurs appreciated. Faint expiratory crackles on respiratory exam.   Temp: 98  F (36.7  C) Temp src: Oral BP: (!) 88/50   Heart Rate: 126 Resp: 10 SpO2: 99 % O2 Device: High Flow Nasal Cannula (HFNC) Oxygen Delivery: Other (Comments) (30LPM)    A/P: Given her continued hypotension post-dialysis, will give 5% albumin 500ml over 1 hour and reassess. If she continues to be hypotensive at that point, will consider adding on levophed for BP support.     Scribed by June Ansari MS4 for Nikolas Olivier MD.

## 2017-10-13 LAB
GLUCOSE BLDC GLUCOMTR-MCNC: 167 MG/DL (ref 70–99)
GLUCOSE BLDC GLUCOMTR-MCNC: 193 MG/DL (ref 70–99)
GLUCOSE BLDC GLUCOMTR-MCNC: 292 MG/DL (ref 70–99)
GLUCOSE SERPL-MCNC: 174 MG/DL (ref 70–99)
HBA1C MFR BLD: 6.6 % (ref 4.3–6)
INR PPP: 1.19 (ref 0.86–1.14)

## 2017-10-13 PROCEDURE — A9270 NON-COVERED ITEM OR SERVICE: HCPCS | Mod: GY | Performed by: SURGERY

## 2017-10-13 PROCEDURE — 83036 HEMOGLOBIN GLYCOSYLATED A1C: CPT | Performed by: INTERNAL MEDICINE

## 2017-10-13 PROCEDURE — 25000132 ZZH RX MED GY IP 250 OP 250 PS 637: Mod: GY | Performed by: SURGERY

## 2017-10-13 PROCEDURE — 40000884 ZZH STATISTIC STEP DOWN HRS NIGHT

## 2017-10-13 PROCEDURE — A9270 NON-COVERED ITEM OR SERVICE: HCPCS | Mod: GY | Performed by: INTERNAL MEDICINE

## 2017-10-13 PROCEDURE — 25000132 ZZH RX MED GY IP 250 OP 250 PS 637: Mod: GY | Performed by: INTERNAL MEDICINE

## 2017-10-13 PROCEDURE — 40000885 ZZH STATISTIC STEP DOWN HRS EVENING

## 2017-10-13 PROCEDURE — 94640 AIRWAY INHALATION TREATMENT: CPT

## 2017-10-13 PROCEDURE — 40000275 ZZH STATISTIC RCP TIME EA 10 MIN

## 2017-10-13 PROCEDURE — 25000131 ZZH RX MED GY IP 250 OP 636 PS 637: Mod: GY | Performed by: NURSE PRACTITIONER

## 2017-10-13 PROCEDURE — 63400005 ZZH RX 634: Performed by: INTERNAL MEDICINE

## 2017-10-13 PROCEDURE — 25000128 H RX IP 250 OP 636: Performed by: INTERNAL MEDICINE

## 2017-10-13 PROCEDURE — 25000128 H RX IP 250 OP 636: Performed by: NURSE PRACTITIONER

## 2017-10-13 PROCEDURE — 94640 AIRWAY INHALATION TREATMENT: CPT | Mod: 76

## 2017-10-13 PROCEDURE — 90937 HEMODIALYSIS REPEATED EVAL: CPT

## 2017-10-13 PROCEDURE — A9270 NON-COVERED ITEM OR SERVICE: HCPCS | Mod: GY | Performed by: NURSE PRACTITIONER

## 2017-10-13 PROCEDURE — A9270 NON-COVERED ITEM OR SERVICE: HCPCS | Mod: GY

## 2017-10-13 PROCEDURE — 82947 ASSAY GLUCOSE BLOOD QUANT: CPT | Performed by: INTERNAL MEDICINE

## 2017-10-13 PROCEDURE — 40000239 ZZH STATISTIC VAT ROUNDS

## 2017-10-13 PROCEDURE — 21000001 ZZH R&B HEART CARE

## 2017-10-13 PROCEDURE — 40000886 ZZH STATISTIC STEP DOWN HRS DAY

## 2017-10-13 PROCEDURE — 00000146 ZZHCL STATISTIC GLUCOSE BY METER IP

## 2017-10-13 PROCEDURE — 85610 PROTHROMBIN TIME: CPT | Performed by: INTERNAL MEDICINE

## 2017-10-13 PROCEDURE — 99232 SBSQ HOSP IP/OBS MODERATE 35: CPT | Performed by: INTERNAL MEDICINE

## 2017-10-13 PROCEDURE — 94660 CPAP INITIATION&MGMT: CPT

## 2017-10-13 PROCEDURE — 25000125 ZZHC RX 250: Performed by: SURGERY

## 2017-10-13 PROCEDURE — 25000132 ZZH RX MED GY IP 250 OP 250 PS 637: Mod: GY | Performed by: NURSE PRACTITIONER

## 2017-10-13 PROCEDURE — 99233 SBSQ HOSP IP/OBS HIGH 50: CPT | Performed by: INTERNAL MEDICINE

## 2017-10-13 PROCEDURE — 25000132 ZZH RX MED GY IP 250 OP 250 PS 637: Mod: GY

## 2017-10-13 RX ORDER — WARFARIN SODIUM 2 MG/1
2 TABLET ORAL
Status: COMPLETED | OUTPATIENT
Start: 2017-10-13 | End: 2017-10-13

## 2017-10-13 RX ORDER — ALBUMIN (HUMAN) 12.5 G/50ML
50 SOLUTION INTRAVENOUS
Status: DISCONTINUED | OUTPATIENT
Start: 2017-10-13 | End: 2017-10-13

## 2017-10-13 RX ORDER — DOXERCALCIFEROL 4 UG/2ML
4 INJECTION INTRAVENOUS SEE ADMIN INSTRUCTIONS
Status: DISCONTINUED | OUTPATIENT
Start: 2017-10-13 | End: 2017-10-13

## 2017-10-13 RX ORDER — AMIODARONE HYDROCHLORIDE 200 MG/1
200 TABLET ORAL DAILY
Status: DISCONTINUED | OUTPATIENT
Start: 2017-10-14 | End: 2017-10-15

## 2017-10-13 RX ORDER — PRAMIPEXOLE DIHYDROCHLORIDE 0.12 MG/1
0.12 TABLET ORAL 3 TIMES DAILY
Status: DISCONTINUED | OUTPATIENT
Start: 2017-10-13 | End: 2017-10-18 | Stop reason: HOSPADM

## 2017-10-13 RX ORDER — HEPARIN SODIUM 1000 [USP'U]/ML
500 INJECTION, SOLUTION INTRAVENOUS; SUBCUTANEOUS
Status: COMPLETED | OUTPATIENT
Start: 2017-10-13 | End: 2017-10-13

## 2017-10-13 RX ORDER — HEPARIN SODIUM 1000 [USP'U]/ML
500 INJECTION, SOLUTION INTRAVENOUS; SUBCUTANEOUS CONTINUOUS
Status: DISCONTINUED | OUTPATIENT
Start: 2017-10-13 | End: 2017-10-13

## 2017-10-13 RX ADMIN — Medication 0.25 MG: at 00:21

## 2017-10-13 RX ADMIN — IPRATROPIUM BROMIDE AND ALBUTEROL SULFATE 3 ML: .5; 3 SOLUTION RESPIRATORY (INHALATION) at 20:14

## 2017-10-13 RX ADMIN — MICONAZOLE NITRATE: 2 POWDER TOPICAL at 14:54

## 2017-10-13 RX ADMIN — MICONAZOLE NITRATE: 2 POWDER TOPICAL at 02:21

## 2017-10-13 RX ADMIN — PANTOPRAZOLE SODIUM 40 MG: 40 TABLET, DELAYED RELEASE ORAL at 13:18

## 2017-10-13 RX ADMIN — Medication 0.25 MG: at 07:43

## 2017-10-13 RX ADMIN — AMIODARONE HYDROCHLORIDE 0.5 MG/MIN: 50 INJECTION, SOLUTION INTRAVENOUS at 13:38

## 2017-10-13 RX ADMIN — INSULIN ASPART 1 UNITS: 100 INJECTION, SOLUTION INTRAVENOUS; SUBCUTANEOUS at 13:17

## 2017-10-13 RX ADMIN — HEPARIN SODIUM 1600 UNITS: 1000 INJECTION, SOLUTION INTRAVENOUS; SUBCUTANEOUS at 11:26

## 2017-10-13 RX ADMIN — Medication 0.25 MG: at 21:06

## 2017-10-13 RX ADMIN — INSULIN GLARGINE 5 UNITS: 100 INJECTION, SOLUTION SUBCUTANEOUS at 13:18

## 2017-10-13 RX ADMIN — EPOETIN ALFA 4000 UNITS: 4000 SOLUTION INTRAVENOUS; SUBCUTANEOUS at 09:36

## 2017-10-13 RX ADMIN — SODIUM CHLORIDE 250 ML: 9 INJECTION, SOLUTION INTRAVENOUS at 08:45

## 2017-10-13 RX ADMIN — HEPARIN SODIUM 500 UNITS: 1000 INJECTION, SOLUTION INTRAVENOUS; SUBCUTANEOUS at 08:47

## 2017-10-13 RX ADMIN — SODIUM CHLORIDE 100 ML: 9 INJECTION, SOLUTION INTRAVENOUS at 09:22

## 2017-10-13 RX ADMIN — PRAMIPEXOLE DIHYDROCHLORIDE 0.12 MG: 0.12 TABLET ORAL at 17:42

## 2017-10-13 RX ADMIN — PENTOXIFYLLINE 400 MG: 400 TABLET, FILM COATED, EXTENDED RELEASE ORAL at 13:17

## 2017-10-13 RX ADMIN — AMIODARONE HYDROCHLORIDE 0.5 MG/MIN: 50 INJECTION, SOLUTION INTRAVENOUS at 22:06

## 2017-10-13 RX ADMIN — HEPARIN SODIUM 500 UNITS/HR: 1000 INJECTION, SOLUTION INTRAVENOUS; SUBCUTANEOUS at 08:47

## 2017-10-13 RX ADMIN — PRAMIPEXOLE DIHYDROCHLORIDE 0.12 MG: 0.12 TABLET ORAL at 02:21

## 2017-10-13 RX ADMIN — PRAMIPEXOLE DIHYDROCHLORIDE 0.12 MG: 0.12 TABLET ORAL at 21:06

## 2017-10-13 RX ADMIN — DOXERCALCIFEROL 4 MCG: 2 INJECTION, SOLUTION INTRAVENOUS at 08:51

## 2017-10-13 RX ADMIN — INSULIN ASPART 1 UNITS: 100 INJECTION, SOLUTION INTRAVENOUS; SUBCUTANEOUS at 07:44

## 2017-10-13 RX ADMIN — WARFARIN SODIUM 2 MG: 2 TABLET ORAL at 18:59

## 2017-10-13 RX ADMIN — CHOLECALCIFEROL CAP 125 MCG (5000 UNIT) 5000 UNITS: 125 CAP at 13:17

## 2017-10-13 RX ADMIN — HEPARIN SODIUM 1600 UNITS: 1000 INJECTION, SOLUTION INTRAVENOUS; SUBCUTANEOUS at 11:24

## 2017-10-13 NOTE — PLAN OF CARE
Problem: Patient Care Overview  Goal: Plan of Care/Patient Progress Review  PT: Pt currently undergoing dialysis in the room, 's. Not appropriate for PT at this time.

## 2017-10-13 NOTE — PLAN OF CARE
Problem: Patient Care Overview  Goal: Plan of Care/Patient Progress Review  Outcome: No Change  VSS. Afebrile. BiPAP on overnight. Tele: Afib RVR , HR 100s-140's. Amio gtt stopped at 0300, transitioning to PO this am. LS diminished in bases. BS active. Voiding. Slept between frequent cares. IMC.

## 2017-10-13 NOTE — PROGRESS NOTES
Potassium   Date Value Ref Range Status   10/12/2017 3.7 3.4 - 5.3 mmol/L Final   ]  Lab Results   Component Value Date    HGB 8.1 10/12/2017     Results for MARI HERNANDEZ (MRN [id number]) as of 10/13/2017 08:54   Ref. Range 10/6/2017 04:21   Hep B Surface Agn Latest Ref Range: NR^Nonreactive  Nonreactive   Hepatitis B Surface Antibody Latest Ref Range: <8.00 m[IU]/mL 42.50 (H)       Weight: 65 kg (143 lb 4.8 oz)    POST WT 64 kg  EDW  TBD kg    DIALYSIS PROCEDURE NOTE    Patient dialyzed for 3 hrs on a 4 K bath with a net fluid removal of 1 L.  A BFR of 400 ml/min was obtained via RIJ.  Patient was seen by  during treatment.  Total heparin received during treatment:  2000 units.  Meds given: Epogen and Hectoral. Complications: Hypotension (systolic BP of 82) during run, 100 ml Nacl bolus given.  Dr. Guerra informed.  Hypotension resolved with raising HOB.     Procedure teaching done, questions answered.  Consent verified yes  See flowsheet in EPIC for further details and post assessment.    Machine water alarm in place and functioning.  Chlorine and chloramines checked on water system every 4 hours.  All safety checks done, air detector on, venous and arterial parameters set. Transducer protectors checked every 15 min. Personally verified previous hepatitis result from last patient run on machine.    Pt dialyzed at bedside in her room.    Outpatient Dialysis TBD.    Milana Crowe RN  [facility]

## 2017-10-13 NOTE — PROGRESS NOTES
[facility]  Cardiology Progress Note  Date of Service: 10/13/2017     Assessment & Plan    Kathryn Banks is a 77 year old female with past medical history significant for CAD s/p CABG x 5, atrial flutter s/p ablation and DCCV 6/2017. Admitted on 10/2/2017 with sepsis and pneumonia with PAF RVR.     Assessment:  1.  Paroxysmal Atrial fibrillation  - rhythm changes coincide with dialysis runs   - rates 140-160 with sbps   - Echo EF 60-65%, pulmonary HTN, diastolic dysfunction, mild-mod reduced RV function  - CHADsVASC 5 age, gender, HTN, DM   - likely from stress of sepsis and pneumonia   - coumadin for anticoagulation  - metoprolol discontinued to make more BP room for rate control   - IV amiodarone bolus and now PO amiodarone      2. S/P atrial flutter ablation   - DCCV 6/2017    3. CAD   - s/p CABG  - 2007 graft to PL occluded and 4 other grafts patent  - no angina     4. ESRD   - fluid overload with pleural effusions   - Lt thoracentesis with 1250 mL removed 10/11  - CXR 10/12 showing significant improvement in pulm edema and lt effusion improved post thoracentesis   - dialysis started 10/2  - Pt making some urine   - lasix IV x 1 10/12 and bumex discontinued       5. Respiratory failure   - managed by intensivist   - BIPAP overnight  - abx discontinued       Plan:     1. Restart IV amiodarone at 0.5 mg/min x 18 hours, then amiodarone  mg daily starting 10/14      Cyn Shelton  2:31 PM 10/9/2017   [facility]  Pager: [phone]      Interval History   Atrial fibrillation RVR at 140-160s with hypotension SBP 70-90s this AM. She has been asymptomatic with both. She was on bipap overnight.  Does have some SOB with respiratory failure and on oxygen currently. Also pleural effusion with previous thoracentesis, on dialysis and making some urine.       Physical Exam   Temp: 97.9  F (36.6  C) Temp src: Axillary BP: 105/69   Heart Rate: 141 Resp: 20 SpO2: 100 % O2 Device: BiPAP/CPAP  Oxygen Delivery: 1 LPM  Vitals:    10/08/17 0630 10/10/17 0600 10/13/17 0655   Weight: 68.8 kg (151 lb 10.8 oz) 70.8 kg (156 lb 1.4 oz) 65 kg (143 lb 4.8 oz)       I did not physically assess the patient as she was undergoing a sterile procedure to start dialysis.     Medications     heparin (porcine)       Warfarin Therapy Reminder       IV fluid REPLACEMENT ONLY         sodium chloride 0.9%  250 mL Hemodialysis Machine Once     sodium chloride 0.9%  250-1,000 mL Intravenous Once in dialysis     epoetin hira (EPOGEN,PROCRIT) inj ESRD  4,000 Units Intravenous See Admin Instructions     doxercalciferol  4 mcg Intravenous See Admin Instructions     heparin (porcine)  500 Units Hemodialysis Machine Once in dialysis     pramipexole  0.125 mg Oral TID     insulin aspart  1-7 Units Subcutaneous TID AC     insulin aspart  1-5 Units Subcutaneous At Bedtime     amiodarone  200 mg Oral Daily     pantoprazole  40 mg Oral QAM     insulin glargine  5 Units Subcutaneous QAM AC     ipratropium - albuterol 0.5 mg/2.5 mg/3 mL  1 vial Nebulization TID     pentoxifylline  400 mg Oral Daily     sodium chloride (PF)  10 mL Intracatheter Q8H     cholecalciferol  5,000 Units Oral Daily     - MEDICATION INSTRUCTIONS for Dialysis Patients -   Does not apply See Admin Instructions       Data   Most Recent 3 CBC's:  Recent Labs   Lab Test  10/12/17   0456  10/11/17   0420  10/10/17   0409   WBC  9.4  8.4  9.3   HGB  8.1*  8.6*  8.5*   MCV  91  90  90   PLT  161  152  129*     Most Recent 3 BMP's:  Recent Labs   Lab Test  10/13/17   0644  10/12/17   0456  10/11/17   0420  10/10/17   0409   NA   --   142  145*  143   POTASSIUM   --   3.7  3.7  3.6   CHLORIDE   --   107  110*  109   CO2   --   29  28  29   BUN   --   12  18  12   CR   --   2.79*  3.68*  2.80*   ANIONGAP   --   6  7  5   MALICK   --   7.4*  7.8*  7.7*   GLC  174*  162*  139*  147*   Most Recent 3 INR's:  Recent Labs   Lab Test  10/13/17   0644  10/12/17   0456  10/11/17   0420   INR   1.19*  1.21*  1.08   Most Recent 3 Troponin's:  Recent Labs   Lab Test  08/26/17   0637  07/11/17   1604  07/11/17   0940   02/22/14   0024   TROPI  <0.015  0.022  0.022   < >   --    TROPONIN   --    --    --    --   0.01    < > = values in this interval not displayed.   Most Recent 3 BNP's:  Recent Labs   Lab Test  09/22/17   0818  09/13/17   1849  09/06/17   0741  08/26/17   0637  08/12/17   1013   01/22/13   1031   NTBNPI   --   95611*   --   24248*  45586*   < >   --    NTBNP  62078*   --   71542*   --    --    --   1280*    < > = values in this interval not displayed.   Most Recent Cholesterol Panel:  Recent Labs   Lab Test  04/12/16   1132   CHOL  127   LDL  47   HDL  65   TRIG  73   Most Recent TSH and T4:  Recent Labs   Lab Test  10/11/17   0420   TSH  5.94*   T4  1.59*       Last 24 hours labs reviewed   EKG: (reviewed personally) 10/4/17 Atrial fibrillation vent rate 147    Imaging:   CXR 10/9/17 0610       IMPRESSION: Persistent bilateral alveolar pulmonary infiltrates,  slightly improved. Moderate left pleural effusion is slightly worse.  Normal heart size. Sternal wires. Catheter from right subclavian  approach with the tip in the upper right atrium. PICC line from the  right with the tip at the cavoatrial junction.    CXR 10/12/17  IMPRESSION: Interval left-sided thoracentesis has resulted in  significant decrease in left pleural effusion. There is also  significant improvement in pulmonary edema pattern since the prior  exam. Previous midline sternotomy and central venous catheters or  ports remain. Heart size is borderline prominent.    Tele: A fib 140-160    Echo:   10/3   Interpretation Summary     The visual ejection fraction is estimated at 60-65%.  Normal left ventricular wall motion  Left ventricular diastolic function is indeterminate.  The right ventricle is mild to moderately dilated.  The right ventricular systolic function is mild to moderately reduced.  The left atrium is moderately  dilated.  The right ventricular systolic pressure is approximated at 42mmHg plus the  right atrial pressure.  Normal IVC (1.5-2.5cm) with <50% respiratory collapse; right atrial pressure  is estimated at 10-15mmHg.  Moderate (46-55mmHg) pulmonary hypertension is present.  Moderate left pleural effusion  Sinus rhythm was noted.  Compared to prior study, there is no significant change.

## 2017-10-13 NOTE — PLAN OF CARE
Problem: Patient Care Overview  Goal: Plan of Care/Patient Progress Review  Outcome: Improving  A/Ox4, BP Mildly hypotensive at 80's/50's at times. Has improved to 110's/70's after dialysis. Tele Afib with RVR, rate 120's - 130's. amio gtt initiated at 1300 @ 30ml/hr. O2 sats 92-94% on RA. Dialysis this AM, tolerated well. PICC to right arm patent. A-2 with walker. LS course/diminished. BLE have small dried out scabbed areas. Coccyx has red area with meplex in place. Will continue to monitor. Pulses +2, CMS intact. No c/o pain. tolerating diet.

## 2017-10-13 NOTE — TELEPHONE ENCOUNTER
Admitted and initiated HD 10/2. Will confirm next week this continues then notify PEÑA Mendez that we will not be managing her anemia    Call date: 10/24    Yesy Samaniego, PharmD, BCACP  [phone]  October 13, 2017

## 2017-10-13 NOTE — PROGRESS NOTES
[facility]  Hospitalist Progress Note  Dheeraj Yates MD  10/13/2017    Assessment & Plan   ASSESSMENT:  This is a 77-year-old female with a history of diastolic congestive heart failure, EF 60% to 65%, type 2 non-insulin dependent diabetes mellitus, hemoglobin A1c of 7.6, paroxysmal atrial fibrillation, coronary artery disease, status post 5-vessel coronary artery bypass graft in the remote past who was admitted to the [facility] for infectious disease issues, was transferred to our ICU due to onset of sepsis and respiratory failure.  She is improving and stable on cardiac telemetry with issus of rapid afib, ongoing HD requirements, oxygen support      PLAN:   1.  Acute hypoxic respiratory failure.    - secondary to suspected pneumonia and volume overload issues related to decompensated diastolic congestive heart failure.    - requiring hemodialysis and had thoracentesis for 2 liters.    - completed a course of meropenem and vancomycin for possible sepsis with procalcitonin coming down.   - weaned off from high flow to room air  - Continue BiPAP at night for obstructive sleep apnea.       - continue with hemodialysis and also with Bumex for diuresis.   .       2.  Sepsis is believed to be secondary to pneumonia, although her urinalysis was positive on 10/01 but this appears to have been adequately treated.    - off of vasopressors.   - has completed course of antibiotics and procalcitonin is down trending.    - patient required a course of vancomycin for C. diff, but last stool on 10/02 was negative.    - continue enteric isolation at this time as she is still having some diarrhea.        3.  Atrial fibrillation with rapid ventricular rate.    - chronically anticoagulated on Coumadin with Rph dosing.    - currently being amiodarone loaded and to transition to oral amiodarone.  - BB on hold to allow more BP room for amiodarone  - cardiology following      4.  Altered mental  status secondary to ICU delirium and encephalopathy related to sepsis.   - dramatically improved as well  - Conservative management of pain meds and benzodiazepines in a patient at age 77, would prefer less use of benzodiazepines and would consider Zyprexa or Haldol if necessary and even occasional oral dosing of Seroquel.        5.  History of diabetes mellitus type 2, not on insulin at home.   - she is transitioned off an insulin drip.   - on Lantus 5 units every morning with breakfast and sliding scale insulin with meals.   - most recent hemoglobin A1c was 7.3 in June.  Re check is 6.6  - continue mod cho      6.  Deep venous thrombosis prophylaxis, anticoagulated with Coumadin.       7.  Code status:  The patient confirms full code.       DISPOSITION:    - she has a bed at [facility] if she were to be discharged over the weekend  - currently ongoing afib with RVR issues.    Interval History   - chart reviewed  - patient being loaded with iv amodarone    -Data reviewed today: I reviewed all new labs and imaging over the last 24 hours. I personally reviewed no images or EKG's today.    Physical Exam   Heart Rate: 140, Blood pressure 91/61, pulse 114, temperature 99.1  F (37.3  C), temperature source Oral, resp. rate 18, weight 65 kg (143 lb 4.8 oz), SpO2 96 %, not currently breastfeeding.  Vitals:    10/10/17 0600 10/13/17 0655 10/13/17 0755   Weight: 70.8 kg (156 lb 1.4 oz) 65 kg (143 lb 4.8 oz) 65 kg (143 lb 4.8 oz)     Vital Signs with Ranges  Temp:  [97.2  F (36.2  C)-99.3  F (37.4  C)] 99.1  F (37.3  C)  Heart Rate:  [] 140  Resp:  [10-30] 18  BP: ()/(53-85) 91/61  SpO2:  [86 %-100 %] 96 %  I/O's Last 24 hours  I/O last 3 completed shifts:  In: -   Out: 1450 [Urine:450; Other:1000]    Constitutional: Awake, alert, cooperative, no apparent distress  Respiratory: Clear to auscultation bilaterally, no crackles or wheezing  Cardiovascular: rapid and irregular  GI: Normal bowel sounds, soft,  non-distended, non-tender  Skin/Integumen: No rashes, no cyanosis, no edema  Other:      Medications   All medications were reviewed.    amiodarone (CORDARONE) infusion ADULT 0.5 mg/min (10/13/17 1548)     Warfarin Therapy Reminder       IV fluid REPLACEMENT ONLY         pramipexole  0.125 mg Oral TID     [START ON 10/14/2017] amiodarone  200 mg Oral Daily     insulin aspart  1-7 Units Subcutaneous TID AC     insulin aspart  1-5 Units Subcutaneous At Bedtime     pantoprazole  40 mg Oral QAM     insulin glargine  5 Units Subcutaneous QAM AC     ipratropium - albuterol 0.5 mg/2.5 mg/3 mL  1 vial Nebulization TID     pentoxifylline  400 mg Oral Daily     sodium chloride (PF)  10 mL Intracatheter Q8H     cholecalciferol  5,000 Units Oral Daily     - MEDICATION INSTRUCTIONS for Dialysis Patients -   Does not apply See Admin Instructions        Data     Recent Labs  Lab 10/13/17  0644 10/12/17  1040 10/12/17  0456 10/11/17  0420 10/10/17  0409   WBC  --   --  9.4 8.4 9.3   HGB  --   --  8.1* 8.6* 8.5*   MCV  --   --  91 90 90   PLT  --   --  161 152 129*   INR 1.19*  --  1.21* 1.08 0.98   NA  --   --  142 145* 143   POTASSIUM  --   --  3.7 3.7 3.6   CHLORIDE  --   --  107 110* 109   CO2  --   --  29 28 29   BUN  --   --  12 18 12   CR  --   --  2.79* 3.68* 2.80*   ANIONGAP  --   --  6 7 5   MALICK  --   --  7.4* 7.8* 7.7*   *  --  162* 139* 147*   ALBUMIN  --   --  2.6* 1.9* 2.0*   PROTTOTAL  --  5.1* 5.0* 4.9* 5.0*   BILITOTAL  --   --  0.4 0.3 0.3   ALKPHOS  --   --  128 145 147   ALT  --   --  8 9 9   AST  --   --  9 12 12       No results found for this or any previous visit (from the past 24 hour(s)).    Dheeraj Yates MD  Text Page  (7am to 6pm)

## 2017-10-13 NOTE — PLAN OF CARE
Problem: Patient Care Overview  Goal: Plan of Care/Patient Progress Review  OT: Orders received and chart reviewed. Talked with pt's nurse, who said pt is just beginning dialysis. Pt's HR in 140s to 150s. Hold therapy for today. Requested order from doctor if ok to see pt with HR over 120s.

## 2017-10-14 ENCOUNTER — APPOINTMENT (OUTPATIENT)
Dept: OCCUPATIONAL THERAPY | Facility: CLINIC | Age: 77
DRG: 871 | End: 2017-10-14
Attending: INTERNAL MEDICINE
Payer: MEDICARE

## 2017-10-14 LAB
ABO + RH BLD: NORMAL
ANION GAP SERPL CALCULATED.3IONS-SCNC: 6 MMOL/L (ref 3–14)
BACTERIA SPEC CULT: NORMAL
BLD GP AB SCN SERPL QL: NORMAL
BLD GP AB SCN SERPL QL: NORMAL
BLD PROD TYP BPU: NORMAL
BLD PROD TYP BPU: NORMAL
BLD UNIT ID BPU: 0
BLOOD BANK CMNT PATIENT-IMP: NORMAL
BLOOD BANK CMNT PATIENT-IMP: NORMAL
BLOOD PRODUCT CODE: NORMAL
BPU ID: NORMAL
BUN SERPL-MCNC: 12 MG/DL (ref 7–30)
CALCIUM SERPL-MCNC: 8 MG/DL (ref 8.5–10.1)
CHLORIDE SERPL-SCNC: 104 MMOL/L (ref 94–109)
CO2 SERPL-SCNC: 27 MMOL/L (ref 20–32)
CREAT SERPL-MCNC: 2.65 MG/DL (ref 0.52–1.04)
ERYTHROCYTE [DISTWIDTH] IN BLOOD BY AUTOMATED COUNT: 19 % (ref 10–15)
GFR SERPL CREATININE-BSD FRML MDRD: 18 ML/MIN/1.7M2
GLUCOSE BLDC GLUCOMTR-MCNC: 182 MG/DL (ref 70–99)
GLUCOSE BLDC GLUCOMTR-MCNC: 183 MG/DL (ref 70–99)
GLUCOSE BLDC GLUCOMTR-MCNC: 208 MG/DL (ref 70–99)
GLUCOSE BLDC GLUCOMTR-MCNC: 239 MG/DL (ref 70–99)
GLUCOSE BLDC GLUCOMTR-MCNC: 367 MG/DL (ref 70–99)
GLUCOSE SERPL-MCNC: 263 MG/DL (ref 70–99)
HCT VFR BLD AUTO: 28.5 % (ref 35–47)
HGB BLD-MCNC: 8.7 G/DL (ref 11.7–15.7)
INR PPP: 1.27 (ref 0.86–1.14)
MCH RBC QN AUTO: 28 PG (ref 26.5–33)
MCHC RBC AUTO-ENTMCNC: 30.5 G/DL (ref 31.5–36.5)
MCV RBC AUTO: 92 FL (ref 78–100)
NUM BPU REQUESTED: 1
PLATELET # BLD AUTO: 205 10E9/L (ref 150–450)
POTASSIUM SERPL-SCNC: 3.8 MMOL/L (ref 3.4–5.3)
RBC # BLD AUTO: 3.11 10E12/L (ref 3.8–5.2)
SODIUM SERPL-SCNC: 137 MMOL/L (ref 133–144)
SPECIMEN EXP DATE BLD: NORMAL
SPECIMEN EXP DATE BLD: NORMAL
SPECIMEN SOURCE: NORMAL
TRANSFUSION STATUS PATIENT QL: NORMAL
TRANSFUSION STATUS PATIENT QL: NORMAL
WBC # BLD AUTO: 7.4 10E9/L (ref 4–11)

## 2017-10-14 PROCEDURE — 97166 OT EVAL MOD COMPLEX 45 MIN: CPT | Mod: GO

## 2017-10-14 PROCEDURE — P9016 RBC LEUKOCYTES REDUCED: HCPCS | Performed by: INTERNAL MEDICINE

## 2017-10-14 PROCEDURE — 25000132 ZZH RX MED GY IP 250 OP 250 PS 637: Mod: GY | Performed by: INTERNAL MEDICINE

## 2017-10-14 PROCEDURE — A9270 NON-COVERED ITEM OR SERVICE: HCPCS | Mod: GY | Performed by: SURGERY

## 2017-10-14 PROCEDURE — 86850 RBC ANTIBODY SCREEN: CPT | Performed by: INTERNAL MEDICINE

## 2017-10-14 PROCEDURE — A9270 NON-COVERED ITEM OR SERVICE: HCPCS | Mod: GY | Performed by: NURSE PRACTITIONER

## 2017-10-14 PROCEDURE — 85027 COMPLETE CBC AUTOMATED: CPT | Performed by: INTERNAL MEDICINE

## 2017-10-14 PROCEDURE — 94640 AIRWAY INHALATION TREATMENT: CPT

## 2017-10-14 PROCEDURE — A9270 NON-COVERED ITEM OR SERVICE: HCPCS | Mod: GY | Performed by: INTERNAL MEDICINE

## 2017-10-14 PROCEDURE — 21000001 ZZH R&B HEART CARE

## 2017-10-14 PROCEDURE — 25000132 ZZH RX MED GY IP 250 OP 250 PS 637: Mod: GY | Performed by: NURSE PRACTITIONER

## 2017-10-14 PROCEDURE — 85610 PROTHROMBIN TIME: CPT | Performed by: INTERNAL MEDICINE

## 2017-10-14 PROCEDURE — 86901 BLOOD TYPING SEROLOGIC RH(D): CPT | Performed by: INTERNAL MEDICINE

## 2017-10-14 PROCEDURE — 00000146 ZZHCL STATISTIC GLUCOSE BY METER IP

## 2017-10-14 PROCEDURE — 25000131 ZZH RX MED GY IP 250 OP 636 PS 637: Mod: GY | Performed by: NURSE PRACTITIONER

## 2017-10-14 PROCEDURE — 40000239 ZZH STATISTIC VAT ROUNDS

## 2017-10-14 PROCEDURE — 80048 BASIC METABOLIC PNL TOTAL CA: CPT | Performed by: INTERNAL MEDICINE

## 2017-10-14 PROCEDURE — 87040 BLOOD CULTURE FOR BACTERIA: CPT | Performed by: INTERNAL MEDICINE

## 2017-10-14 PROCEDURE — 99233 SBSQ HOSP IP/OBS HIGH 50: CPT | Performed by: INTERNAL MEDICINE

## 2017-10-14 PROCEDURE — 94640 AIRWAY INHALATION TREATMENT: CPT | Mod: 76

## 2017-10-14 PROCEDURE — 25000131 ZZH RX MED GY IP 250 OP 636 PS 637: Mod: GY | Performed by: INTERNAL MEDICINE

## 2017-10-14 PROCEDURE — 97530 THERAPEUTIC ACTIVITIES: CPT | Mod: GO

## 2017-10-14 PROCEDURE — 40000133 ZZH STATISTIC OT WARD VISIT

## 2017-10-14 PROCEDURE — 40000275 ZZH STATISTIC RCP TIME EA 10 MIN

## 2017-10-14 PROCEDURE — 25000132 ZZH RX MED GY IP 250 OP 250 PS 637: Mod: GY

## 2017-10-14 PROCEDURE — 86923 COMPATIBILITY TEST ELECTRIC: CPT | Performed by: INTERNAL MEDICINE

## 2017-10-14 PROCEDURE — A9270 NON-COVERED ITEM OR SERVICE: HCPCS | Mod: GY

## 2017-10-14 PROCEDURE — 25000128 H RX IP 250 OP 636: Performed by: INTERNAL MEDICINE

## 2017-10-14 PROCEDURE — 36415 COLL VENOUS BLD VENIPUNCTURE: CPT | Performed by: INTERNAL MEDICINE

## 2017-10-14 PROCEDURE — 25000132 ZZH RX MED GY IP 250 OP 250 PS 637: Mod: GY | Performed by: SURGERY

## 2017-10-14 PROCEDURE — 86900 BLOOD TYPING SEROLOGIC ABO: CPT | Performed by: INTERNAL MEDICINE

## 2017-10-14 PROCEDURE — 99207 ZZC CDG-MDM COMPONENT: MEETS MODERATE - UP CODED: CPT | Performed by: INTERNAL MEDICINE

## 2017-10-14 PROCEDURE — 97110 THERAPEUTIC EXERCISES: CPT | Mod: GO

## 2017-10-14 PROCEDURE — 94660 CPAP INITIATION&MGMT: CPT

## 2017-10-14 PROCEDURE — 25000125 ZZHC RX 250: Performed by: SURGERY

## 2017-10-14 RX ORDER — METOPROLOL TARTRATE 25 MG/1
25 TABLET, FILM COATED ORAL 2 TIMES DAILY
Status: DISCONTINUED | OUTPATIENT
Start: 2017-10-14 | End: 2017-10-18 | Stop reason: HOSPADM

## 2017-10-14 RX ORDER — WARFARIN SODIUM 4 MG/1
4 TABLET ORAL
Status: COMPLETED | OUTPATIENT
Start: 2017-10-14 | End: 2017-10-14

## 2017-10-14 RX ADMIN — WARFARIN SODIUM 4 MG: 4 TABLET ORAL at 19:56

## 2017-10-14 RX ADMIN — PRAMIPEXOLE DIHYDROCHLORIDE 0.12 MG: 0.12 TABLET ORAL at 08:08

## 2017-10-14 RX ADMIN — METOPROLOL TARTRATE 25 MG: 25 TABLET ORAL at 21:14

## 2017-10-14 RX ADMIN — AMIODARONE HYDROCHLORIDE 200 MG: 200 TABLET ORAL at 08:08

## 2017-10-14 RX ADMIN — PRAMIPEXOLE DIHYDROCHLORIDE 0.12 MG: 0.12 TABLET ORAL at 21:14

## 2017-10-14 RX ADMIN — METOPROLOL TARTRATE 25 MG: 25 TABLET ORAL at 12:16

## 2017-10-14 RX ADMIN — Medication 0.25 MG: at 21:20

## 2017-10-14 RX ADMIN — PENTOXIFYLLINE 400 MG: 400 TABLET, FILM COATED, EXTENDED RELEASE ORAL at 08:08

## 2017-10-14 RX ADMIN — IPRATROPIUM BROMIDE AND ALBUTEROL SULFATE 3 ML: .5; 3 SOLUTION RESPIRATORY (INHALATION) at 19:26

## 2017-10-14 RX ADMIN — INSULIN GLARGINE 3 UNITS: 100 INJECTION, SOLUTION SUBCUTANEOUS at 09:57

## 2017-10-14 RX ADMIN — IPRATROPIUM BROMIDE AND ALBUTEROL SULFATE 3 ML: .5; 3 SOLUTION RESPIRATORY (INHALATION) at 14:12

## 2017-10-14 RX ADMIN — PANTOPRAZOLE SODIUM 40 MG: 40 TABLET, DELAYED RELEASE ORAL at 08:08

## 2017-10-14 RX ADMIN — INSULIN ASPART 5 UNITS: 100 INJECTION, SOLUTION INTRAVENOUS; SUBCUTANEOUS at 11:20

## 2017-10-14 RX ADMIN — SODIUM CHLORIDE 500 ML: 9 INJECTION, SOLUTION INTRAVENOUS at 10:47

## 2017-10-14 RX ADMIN — CHOLECALCIFEROL CAP 125 MCG (5000 UNIT) 5000 UNITS: 125 CAP at 08:08

## 2017-10-14 RX ADMIN — INSULIN ASPART 1 UNITS: 100 INJECTION, SOLUTION INTRAVENOUS; SUBCUTANEOUS at 08:09

## 2017-10-14 RX ADMIN — INSULIN GLARGINE 5 UNITS: 100 INJECTION, SOLUTION SUBCUTANEOUS at 08:10

## 2017-10-14 RX ADMIN — INSULIN ASPART 1 UNITS: 100 INJECTION, SOLUTION INTRAVENOUS; SUBCUTANEOUS at 18:39

## 2017-10-14 RX ADMIN — IPRATROPIUM BROMIDE AND ALBUTEROL SULFATE 3 ML: .5; 3 SOLUTION RESPIRATORY (INHALATION) at 08:39

## 2017-10-14 RX ADMIN — PRAMIPEXOLE DIHYDROCHLORIDE 0.12 MG: 0.12 TABLET ORAL at 17:37

## 2017-10-14 NOTE — PROGRESS NOTES
Pt on BiPAP 12/5 35% throughout the night, tolerated well. RT will continue to follow.  10/14/2017  Jenna Barrera

## 2017-10-14 NOTE — PLAN OF CARE
Problem: Patient Care Overview  Goal: Plan of Care/Patient Progress Review  PT: Discussed pt with CR/OT. Pt's - 221at rest and with activity. Awaiting clarification of orders for activity and HR parameters prior to further therapy intervention. Current order from 10/06 states notify if HR >120.

## 2017-10-14 NOTE — PROGRESS NOTES
Paged regarding asymptomatic hypotension. Per RN someone had told patient not to drink water previously, unclear why. Gave 500ml NS bolus with BP 72/48 --> 119/72. Cont to monitor UO.    D/w Cardiology. Request Hgb closer to 10 in anticipation of AVN ablation 10/16; given positive response to fluid challenge, suspect component of anemia driving tachycardia. Orders placed for PRBC x 1. Patient was already consented earlier this admission.

## 2017-10-14 NOTE — PROGRESS NOTES
[facility]    Cardiology Progress Note     Sheri Peter MD    INTERVAL HISTORY:  Patient dialyzed yesterday. She is starting to make urine. Still having issues with rapid ventricular rates from the atrial fibrillation despite being on IV amiodarone infusion, and intermittently significantly low blood pressures of 70s systolic during which she is completely asymptomatic. She was given a 500 cc bolus of normal saline earlier this morning for low BP of 72/48.    PHYSICAL EXAM:  Heart rate 129 bpm (atrial fibrillation), /72 mmHg  When I visited her, she was comfortably lying in bed, fully alert and oriented x 3, jugular venous pressure is not elevated. Heart sounds are irregularly irregular with a ventricular rate of 120 BPM, no audible murmur, lungs are clear, no lower extremity edema, abdomen is soft. Clinically appears dry.    LABS:  Sodium 137, potassium 3.8, BUN 12, creatinine 2.65, estimated GFR 18, hemoglobin 8.7.    DIAGNOSES:  1. Paroxysmal atrial fibrillation with rapid ventricular rates, recalcitrant to medical therapy.  2. Hypotension.  3. Status post atrial flutter ablation and cardioversion in June 2017.  4. Stable coronary artery disease status post CABG X five.  5. Sepsis secondary to pneumonia, resolved.  6. Acute kidney injury secondary to #5. Currently requiring hemodialysis.  7. Anemia. Hemoglobin 8.7.    ASSESSMENT/PLAN:  1. Continue oral amiodarone 200 mg daily. Recent TSH normal.  2. Blood pressure has improved following IV fluid bolus. I suspect she is intravascularly dry. Encouraged her to increase oral intake. She has been drastically reducing fluid intake. Recommended fluid intake 9977-5999 mL per day.  3. Add metoprolol tartrate 25 mg twice daily. If she does not tolerate this well, we can back off. She has been on this in the past.  4. Consider another unit of blood transfusion during her next dialysis. This might help the atrial fibrillation rate control.  5.  Blood culture today as part of workup for possible AV jacqueline ablation and pacemaker implantation.  6. EP consult on Monday, 10/16.  7. She was scheduled to be transferred to a long-term facility tomorrow. I think this is premature. Due to her low blood pressure and rate control issues, she will be rehospitalized rapidly. I recommend postponing this for now.    Sheri Peter MD

## 2017-10-14 NOTE — PROGRESS NOTES
Patient is on room air and SpO2 high 90`s. Scheduled neb tx given and tolerated well. BBS clear/diminished. Will continue to follow.

## 2017-10-14 NOTE — PLAN OF CARE
Problem: Patient Care Overview  Goal: Plan of Care/Patient Progress Review  Outcome: No Change  Patient has no change: Patient remains in Afib/flutter with -170s.  SBP 70s in AM-resolved after 500cc fluid bolus.  Pt is scheduled to D/C to [facility] tomorrow; however, pt may not be stable enough due to HR and BP.  Cardiology would like pt to stay thru Monday to have ablation and PPM.  Pt continues to have bilateral groin excoriation and redness-Miconazole powder and good pericare given.  Pressure ulcer on coccyx-blanchable redness and intact skin-mepilex drsg in place-WOC consult.       Changes: MD ordered STAT blood cultures. Pt afebrile.

## 2017-10-14 NOTE — PLAN OF CARE
Problem: Patient Care Overview  Goal: Plan of Care/Patient Progress Review  OT eval completed and treatment initiated per pt activity tolerance. Continues to be in afib w/RVR; at rest -221bpm.   Discharge Planner OT   Patient plan for discharge: did not state  Current status: Min A with sit-stand and chair<>commode, unable to perform grooming tasks in standing due to elevating HR but able to perform indep seated with set-up. Trialled seated BUE calesthenics for strengthening and tolerated 1min intervals during which -132. When stopped for rest break HR back to 109-221.  Pt declined to trial any amb in room due to concerns with HR. Please have CARDs specify in orders if patient okay to ambulate when HR >120 or to include safe parameters for activity.  Barriers to return to prior living situation: current impaired activity tolerance      Recommendations for discharge: anticipate need for TCU  Rationale for recommendations: given prolonged bedrest pt below functional baseline and will benefit from continued daily therapies at rehab facility prior to return home to [place]       Entered by: Cristian Eller 10/14/2017 2:41 PM

## 2017-10-14 NOTE — PROVIDER NOTIFICATION
Dr. Jeong notified of SBP in 70s.  Patient is not symptomatic.  MD ordered 500cc bolus of NS over 30 min.

## 2017-10-14 NOTE — PROGRESS NOTES
[facility]    Hospitalist Progress Note    Date of Service (when I saw the patient): 10/14/2017    Assessment & Plan   This is a 77-year-old female with a history of diastolic congestive heart failure, EF 60% to 65%, type 2 non-insulin dependent diabetes mellitus, hemoglobin A1c of 7.6, paroxysmal atrial fibrillation, coronary artery disease, status post 5-vessel coronary artery bypass graft in the remote past who was admitted to the [facility] for infectious disease issues, was transferred to our ICU due to onset of sepsis and respiratory failure.  She is improving and stable on cardiac telemetry with issus of rapid zay and, ongoing HD requirements precluding discharge.      PLAN:   1.  Acute hypoxic respiratory failure.    -Secondary to suspected pneumonia and volume overload issues related to decompensated diastolic congestive heart failure. Requiring hemodialysis and had thoracentesis for 2 liters. Completed a course of meropenem and vancomycin for possible sepsis with procalcitonin coming down (1.70 on 10/12).   - on room air  - Continue BiPAP at night for ANABEL.       - continue with hemodialysis per Nephrology.       2.  Sepsis secondary to pneumonia, although her urinalysis was positive on 10/01 but this appears to have been adequately treated. Abx course completed as above. Also required a course of vancomycin for C. diff, but last stool on 10/02 was negative.    - remains on enteric isolation as she is still having some diarrhea.        3.  Atrial fibrillation with rapid ventricular rate.    - cardiology recommendations appreciated, starting po amiodarone today, repeat limited TTE 10/16 for evaluation of tachycardiomyopathy, and consideration for AVN ablation/PPM  - chronically anticoagulated on Coumadin with Rph dosing, INR subtherapeutic.    - currently being amiodarone loaded and to transition to oral amiodarone, remains in RVR  - Lopressor 25mg po BID      4.  Acute  encephalopathy secondary to ICU delirium, sepsis. Improved.  - Conservative management of pain meds and benzodiazepines  - minimize use of benzodiazepines  - consider antipsychotics if acutely agitated (seroquel, zyprexa)       5.  History of diabetes mellitus type 2, not on insulin at home.   Most recent hemoglobin A1c was 7.3 in June.  Recheck is 6.6 on 10/13  - she is transitioned off an insulin drip.   - on Lantus, increased to 8 units every morning  - Medium SSI   - continue mod cho    DVT Prophylaxis: Warfarin and Pneumatic Compression Devices  Code Status: Full Code    Disposition: Expected discharge in 3 days once cardiology workup complete. CC consulted as pt requests information on medical marijuana.    Francisca Jeong MD    Interval History   No new complaints. Breathing fine. Tolerating diet. Presently on commode. Denies dizziness. Asks about medical marijuana and if I can facilitate this. I states I will not, and that it will not help with her afib as she thinks it might. I offered care coordinator consult to provide resources for the patient so she can f/u this matter with her PCP. She accepts.    -Data reviewed today: I reviewed all new labs and imaging results over the last 24 hours. I personally reviewed no images or EKG's today.    Physical Exam   Temp: 97.6  F (36.4  C) Temp src: Axillary BP: 102/67   Heart Rate: 131 Resp: 18 SpO2: 96 % O2 Device: None (Room air)    Vitals:    10/13/17 0655 10/13/17 0755 10/14/17 0500   Weight: 65 kg (143 lb 4.8 oz) 65 kg (143 lb 4.8 oz) 64.2 kg (141 lb 8.6 oz)     Vital Signs with Ranges  Temp:  [97.6  F (36.4  C)-99.1  F (37.3  C)] 97.6  F (36.4  C)  Heart Rate:  [123-160] 131  Resp:  [10-29] 18  BP: ()/(53-85) 102/67  FiO2 (%):  [35 %] 35 %  SpO2:  [86 %-100 %] 96 %  I/O last 3 completed shifts:  In: 784 [P.O.:300; I.V.:484]  Out: 1250 [Urine:250; Other:1000]    Constitutional: Awake, alert, cooperative, no apparent distress  Respiratory: Clear to  auscultation bilaterally, no crackles or wheezing  Cardiovascular: Irregular rhythm, tachycardic, normal S1 and S2, and no murmur noted  GI: Normal bowel sounds, soft, non-distended, non-tender  Skin/Integumen: No rashes, no cyanosis, no edema    Medications     amiodarone (CORDARONE) infusion ADULT 0.5 mg/min (10/14/17 0542)     Warfarin Therapy Reminder       IV fluid REPLACEMENT ONLY         metoprolol  25 mg Oral BID     [START ON 10/15/2017] insulin glargine  8 Units Subcutaneous QAM AC     insulin glargine  3 Units Subcutaneous Once     pramipexole  0.125 mg Oral TID     amiodarone  200 mg Oral Daily     insulin aspart  1-7 Units Subcutaneous TID AC     insulin aspart  1-5 Units Subcutaneous At Bedtime     pantoprazole  40 mg Oral QAM     ipratropium - albuterol 0.5 mg/2.5 mg/3 mL  1 vial Nebulization TID     pentoxifylline  400 mg Oral Daily     sodium chloride (PF)  10 mL Intracatheter Q8H     cholecalciferol  5,000 Units Oral Daily     - MEDICATION INSTRUCTIONS for Dialysis Patients -   Does not apply See Admin Instructions       Data     Recent Labs  Lab 10/14/17  0539 10/13/17  0644 10/12/17  1040 10/12/17  0456 10/11/17  0420   WBC 7.4  --   --  9.4 8.4   HGB 8.7*  --   --  8.1* 8.6*   MCV 92  --   --  91 90     --   --  161 152   INR 1.27* 1.19*  --  1.21* 1.08     --   --  142 145*   POTASSIUM 3.8  --   --  3.7 3.7   CHLORIDE 104  --   --  107 110*   CO2 27  --   --  29 28   BUN 12  --   --  12 18   CR 2.65*  --   --  2.79* 3.68*   ANIONGAP 6  --   --  6 7   MALICK 8.0*  --   --  7.4* 7.8*   * 174*  --  162* 139*   ALBUMIN  --   --   --  2.6* 1.9*   PROTTOTAL  --   --  5.1* 5.0* 4.9*   BILITOTAL  --   --   --  0.4 0.3   ALKPHOS  --   --   --  128 145   ALT  --   --   --  8 9   AST  --   --   --  9 12       No results found for this or any previous visit (from the past 24 hour(s)).

## 2017-10-14 NOTE — PROGRESS NOTES
10/14/17 1414   Quick Adds   Type of Visit Initial Occupational Therapy Evaluation   Living Environment   Lives With child(zoe), adult;grandchild(zoe)   Living Arrangements house   Home Accessibility bed and bath on same level   Number of Stairs to Enter Home 0   Number of Stairs Within Home 0   Stair Railings at Home none   Transportation Available family or friend will provide   Self-Care   Dominant Hand right   Usual Activity Tolerance good   Current Activity Tolerance poor   Regular Exercise no   Equipment Currently Used at Home shower chair;walker, rolling;raised toilet   Functional Level Prior   Ambulation 1-->assistive equipment   Transferring 1-->assistive equipment   Toileting 1-->assistive equipment   Bathing 1-->assistive equipment   Dressing 2-->assistive person   Eating 0-->independent   Communication 0-->understands/communicates without difficulty   Swallowing 0-->swallows foods/liquids without difficulty   Cognition 0 - no cognition issues reported   Fall history within last six months no   Prior Functional Level Comment Pt reports her granddaughter and friend to most of cooking, has cleaning lady 2x/mo, does own laundry on main floor, indep meds. Spouse helps with shoes.   General Information   Onset of Illness/Injury or Date of Surgery - Date 10/02/17   Referring Physician Magalie WILSON   Patient/Family Goals Statement return home when stronger   Additional Occupational Profile Info/Pertinent History of Current Problem 78yo female admitted with sepsis 2o pneumonia.    Precautions/Limitations fall precautions   Cognitive Status Examination   Orientation orientation to person, place and time   Level of Consciousness alert   Able to Follow Commands success, 1-step commands   Visual Perception   Visual Perception No deficits were identified   Pain Assessment   Patient Currently in Pain No  (coughing)   Range of Motion (ROM)   ROM Comment B santy WFL   Strength   Strength Comments L santy weak, able to  actively range to 165+o but unable to tolerate resistance due to pain (pt does not know cause) R santy 4/5.   Coordination   Coordination Comments Pt able to turn pages of the magazine she is reading, manipulate phone   Transfer Skill: Sit to Stand   Level of [place]: Sit/Stand minimum assist (75% patients effort)   Physical Assist/Nonphysical Assist: Sit/Stand 1 person assist   Assistive Device for Transfer: Sit/Stand rolling walker   Toilet Transfer   Toilet Transfer Comments bedside commode transfer Min A of 1   Grooming   Level of [place]: Grooming independent   Physical Assist/Nonphysical Assist: Grooming set-up required  (seated in chair)   Eating/Self Feeding   Level of [place]: Eating independent   Physical Assist/Nonphysical Assist: Eating set-up required  (seated in chair)   Activities of Daily Living Analysis   Impairments Contributing to Impaired Activities of Daily Living strength decreased   ADL Comments Participation affected by current heart function: pt in atrial fib w/RVR, HR up to 121 at rest, quickly elevated during standing pivot transfer to 130's   General Therapy Interventions   Planned Therapy Interventions ADL retraining;strengthening;home program guidelines;progressive activity/exercise   Clinical Impression   Criteria for Skilled Therapeutic Interventions Met yes, treatment indicated   OT Diagnosis decreased ADL/IADL performance   Influenced by the following impairments strength, activity tolerance, cardiovascular   Assessment of Occupational Performance 3-5 Performance Deficits   Identified Performance Deficits dressing, grooming, toileting, bathing, functional mobility   Clinical Decision Making (Complexity) Moderate complexity   Therapy Frequency daily   Predicted Duration of Therapy Intervention (days/wks) 7 days   Anticipated Discharge Disposition Transitional Care Facility   Risks and Benefits of Treatment have been explained. Yes   Patient, Family & other staff in  "agreement with plan of care Yes   [facility] TM \"6 Clicks\"   2016, Trustees of [facility], under license to [employer].  All rights reserved.   6 Clicks Short Forms Daily Activity Inpatient Short Form   [facility]  \"6 Clicks\" Daily Activity Inpatient Short Form   1. Putting on and taking off regular lower body clothing? 3 - A Little   2. Bathing (including washing, rinsing, drying)? 3 - A Little   3. Toileting, which includes using toilet, bedpan or urinal? 3 - A Little   4. Putting on and taking off regular upper body clothing? 3 - A Little   5. Taking care of personal grooming such as brushing teeth? 4 - None   6. Eating meals? 4 - None   Daily Activity Raw Score (Score out of 24.Lower scores equate to lower levels of function) 20   Total Evaluation Time   Total Evaluation Time (Minutes) 15     "

## 2017-10-15 ENCOUNTER — APPOINTMENT (OUTPATIENT)
Dept: PHYSICAL THERAPY | Facility: CLINIC | Age: 77
DRG: 871 | End: 2017-10-15
Attending: INTERNAL MEDICINE
Payer: MEDICARE

## 2017-10-15 ENCOUNTER — APPOINTMENT (OUTPATIENT)
Dept: OCCUPATIONAL THERAPY | Facility: CLINIC | Age: 77
DRG: 871 | End: 2017-10-15
Attending: INTERNAL MEDICINE
Payer: MEDICARE

## 2017-10-15 LAB
ANION GAP SERPL CALCULATED.3IONS-SCNC: 8 MMOL/L (ref 3–14)
BUN SERPL-MCNC: 18 MG/DL (ref 7–30)
CALCIUM SERPL-MCNC: 7.7 MG/DL (ref 8.5–10.1)
CHLORIDE SERPL-SCNC: 111 MMOL/L (ref 94–109)
CO2 SERPL-SCNC: 25 MMOL/L (ref 20–32)
CREAT SERPL-MCNC: 3.69 MG/DL (ref 0.52–1.04)
GFR SERPL CREATININE-BSD FRML MDRD: 12 ML/MIN/1.7M2
GLUCOSE BLDC GLUCOMTR-MCNC: 118 MG/DL (ref 70–99)
GLUCOSE BLDC GLUCOMTR-MCNC: 133 MG/DL (ref 70–99)
GLUCOSE BLDC GLUCOMTR-MCNC: 141 MG/DL (ref 70–99)
GLUCOSE BLDC GLUCOMTR-MCNC: 272 MG/DL (ref 70–99)
GLUCOSE SERPL-MCNC: 94 MG/DL (ref 70–99)
HGB BLD-MCNC: 10.4 G/DL (ref 11.7–15.7)
INR PPP: 1.29 (ref 0.86–1.14)
MAGNESIUM SERPL-MCNC: 1.8 MG/DL (ref 1.6–2.3)
PHOSPHATE SERPL-MCNC: 3.8 MG/DL (ref 2.5–4.5)
POTASSIUM SERPL-SCNC: 3.9 MMOL/L (ref 3.4–5.3)
SODIUM SERPL-SCNC: 144 MMOL/L (ref 133–144)

## 2017-10-15 PROCEDURE — 99232 SBSQ HOSP IP/OBS MODERATE 35: CPT | Performed by: INTERNAL MEDICINE

## 2017-10-15 PROCEDURE — 94640 AIRWAY INHALATION TREATMENT: CPT | Mod: 76

## 2017-10-15 PROCEDURE — 99233 SBSQ HOSP IP/OBS HIGH 50: CPT | Performed by: INTERNAL MEDICINE

## 2017-10-15 PROCEDURE — A9270 NON-COVERED ITEM OR SERVICE: HCPCS | Mod: GY | Performed by: NURSE PRACTITIONER

## 2017-10-15 PROCEDURE — 25000132 ZZH RX MED GY IP 250 OP 250 PS 637: Mod: GY | Performed by: NURSE PRACTITIONER

## 2017-10-15 PROCEDURE — A9270 NON-COVERED ITEM OR SERVICE: HCPCS | Mod: GY | Performed by: INTERNAL MEDICINE

## 2017-10-15 PROCEDURE — 85018 HEMOGLOBIN: CPT | Performed by: INTERNAL MEDICINE

## 2017-10-15 PROCEDURE — 25000132 ZZH RX MED GY IP 250 OP 250 PS 637: Mod: GY | Performed by: SURGERY

## 2017-10-15 PROCEDURE — 21000001 ZZH R&B HEART CARE

## 2017-10-15 PROCEDURE — 83735 ASSAY OF MAGNESIUM: CPT | Performed by: INTERNAL MEDICINE

## 2017-10-15 PROCEDURE — 93010 ELECTROCARDIOGRAM REPORT: CPT | Performed by: INTERNAL MEDICINE

## 2017-10-15 PROCEDURE — A9270 NON-COVERED ITEM OR SERVICE: HCPCS | Mod: GY

## 2017-10-15 PROCEDURE — 85610 PROTHROMBIN TIME: CPT | Performed by: INTERNAL MEDICINE

## 2017-10-15 PROCEDURE — 25000132 ZZH RX MED GY IP 250 OP 250 PS 637: Mod: GY | Performed by: INTERNAL MEDICINE

## 2017-10-15 PROCEDURE — 25000125 ZZHC RX 250: Performed by: SURGERY

## 2017-10-15 PROCEDURE — 97535 SELF CARE MNGMENT TRAINING: CPT | Mod: GO

## 2017-10-15 PROCEDURE — 40000133 ZZH STATISTIC OT WARD VISIT

## 2017-10-15 PROCEDURE — 80048 BASIC METABOLIC PNL TOTAL CA: CPT | Performed by: INTERNAL MEDICINE

## 2017-10-15 PROCEDURE — 40000193 ZZH STATISTIC PT WARD VISIT

## 2017-10-15 PROCEDURE — 93005 ELECTROCARDIOGRAM TRACING: CPT

## 2017-10-15 PROCEDURE — 40000275 ZZH STATISTIC RCP TIME EA 10 MIN

## 2017-10-15 PROCEDURE — 00000146 ZZHCL STATISTIC GLUCOSE BY METER IP

## 2017-10-15 PROCEDURE — A9270 NON-COVERED ITEM OR SERVICE: HCPCS | Mod: GY | Performed by: SURGERY

## 2017-10-15 PROCEDURE — 84100 ASSAY OF PHOSPHORUS: CPT | Performed by: INTERNAL MEDICINE

## 2017-10-15 PROCEDURE — 99207 ZZC CDG-MDM COMPONENT: MEETS MODERATE - UP CODED: CPT | Performed by: INTERNAL MEDICINE

## 2017-10-15 PROCEDURE — 25000131 ZZH RX MED GY IP 250 OP 636 PS 637: Mod: GY | Performed by: INTERNAL MEDICINE

## 2017-10-15 PROCEDURE — 94640 AIRWAY INHALATION TREATMENT: CPT

## 2017-10-15 PROCEDURE — 97116 GAIT TRAINING THERAPY: CPT | Mod: GP

## 2017-10-15 PROCEDURE — 97110 THERAPEUTIC EXERCISES: CPT | Mod: GP

## 2017-10-15 PROCEDURE — 25000132 ZZH RX MED GY IP 250 OP 250 PS 637: Mod: GY

## 2017-10-15 RX ORDER — AMIODARONE HYDROCHLORIDE 200 MG/1
200 TABLET ORAL DAILY
Status: DISCONTINUED | OUTPATIENT
Start: 2017-10-16 | End: 2017-10-16

## 2017-10-15 RX ORDER — WARFARIN SODIUM 4 MG/1
4 TABLET ORAL
Status: COMPLETED | OUTPATIENT
Start: 2017-10-15 | End: 2017-10-15

## 2017-10-15 RX ADMIN — METOPROLOL TARTRATE 25 MG: 25 TABLET ORAL at 09:08

## 2017-10-15 RX ADMIN — CHOLECALCIFEROL CAP 125 MCG (5000 UNIT) 5000 UNITS: 125 CAP at 08:01

## 2017-10-15 RX ADMIN — PRAMIPEXOLE DIHYDROCHLORIDE 0.12 MG: 0.12 TABLET ORAL at 17:02

## 2017-10-15 RX ADMIN — INSULIN GLARGINE 8 UNITS: 100 INJECTION, SOLUTION SUBCUTANEOUS at 08:16

## 2017-10-15 RX ADMIN — PRAMIPEXOLE DIHYDROCHLORIDE 0.12 MG: 0.12 TABLET ORAL at 08:01

## 2017-10-15 RX ADMIN — METOPROLOL TARTRATE 25 MG: 25 TABLET ORAL at 21:29

## 2017-10-15 RX ADMIN — PANTOPRAZOLE SODIUM 40 MG: 40 TABLET, DELAYED RELEASE ORAL at 08:02

## 2017-10-15 RX ADMIN — Medication 0.25 MG: at 21:37

## 2017-10-15 RX ADMIN — IPRATROPIUM BROMIDE AND ALBUTEROL SULFATE 3 ML: .5; 3 SOLUTION RESPIRATORY (INHALATION) at 20:49

## 2017-10-15 RX ADMIN — IPRATROPIUM BROMIDE AND ALBUTEROL SULFATE 3 ML: .5; 3 SOLUTION RESPIRATORY (INHALATION) at 12:40

## 2017-10-15 RX ADMIN — IPRATROPIUM BROMIDE AND ALBUTEROL SULFATE 3 ML: .5; 3 SOLUTION RESPIRATORY (INHALATION) at 08:25

## 2017-10-15 RX ADMIN — Medication 0.25 MG: at 03:36

## 2017-10-15 RX ADMIN — AMIODARONE HYDROCHLORIDE 200 MG: 200 TABLET ORAL at 09:08

## 2017-10-15 RX ADMIN — PRAMIPEXOLE DIHYDROCHLORIDE 0.12 MG: 0.12 TABLET ORAL at 21:29

## 2017-10-15 RX ADMIN — INSULIN ASPART 1 UNITS: 100 INJECTION, SOLUTION INTRAVENOUS; SUBCUTANEOUS at 12:14

## 2017-10-15 RX ADMIN — WARFARIN SODIUM 4 MG: 4 TABLET ORAL at 17:02

## 2017-10-15 RX ADMIN — PENTOXIFYLLINE 400 MG: 400 TABLET, FILM COATED, EXTENDED RELEASE ORAL at 08:01

## 2017-10-15 NOTE — PROGRESS NOTES
[facility]    Hospitalist Progress Note    Date of Service (when I saw the patient): 10/15/2017    Assessment & Plan   This is a 77-year-old female with a history of diastolic congestive heart failure, EF 60% to 65%, type 2 non-insulin dependent diabetes mellitus, hemoglobin A1c of 7.6, paroxysmal atrial fibrillation, coronary artery disease, status post 5-vessel coronary artery bypass graft in the remote past who was admitted to the [facility] for infectious disease issues, was transferred to our ICU due to onset of sepsis and respiratory failure.  She is improving and stable on cardiac telemetry with issus of rapid afib and ongoing HD requirements precluding discharge.      PLAN:   1.  Acute hypoxic respiratory failure.    - Secondary to suspected pneumonia and volume overload issues related to decompensated diastolic congestive heart failure. Requiring hemodialysis and had thoracentesis for 2 liters. Completed a course of meropenem and vancomycin for possible sepsis with procalcitonin coming down (1.70 on 10/12).   - on room air  - Continue BiPAP at night for ANABEL.       - continue with hemodialysis per Nephrology.       2.  Sepsis secondary to pneumonia, although her urinalysis was positive on 10/01 but this appears to have been adequately treated. Abx course completed as above. Also required a course of vancomycin for C. diff, but last stool on 10/02 was negative.    - remains on enteric isolation as she is still having some diarrhea.        3.  Atrial fibrillation with rapid ventricular rate.    - cardiology recommendations appreciated, starting po amiodarone 10/14, repeat limited TTE 10/16 for evaluation of tachycardiomyopathy, and consideration for AVN ablation/PPM  - chronically anticoagulated on Coumadin with Rph dosing, INR subtherapeutic.    - Given PRBC x 1 10/14 per cards request to have Hgb>10 for procedure 10/15  - Bld cx per cardiology pending  - Hgb this  morning 10.4  - Lopressor 25mg po BID  - BP improved 10/14 with 500ml fluid bolus, encourage pt to increase oral intake as she was trying to fluid restrict herself      4.  Acute encephalopathy secondary to ICU delirium, sepsis. Improved.  - Conservative management of pain meds and benzodiazepines  - minimize use of benzodiazepines  - consider antipsychotics if acutely agitated (seroquel, zyprexa)       5.  History of diabetes mellitus type 2, not on insulin at home.   Most recent hemoglobin A1c was 7.3 in June.  Recheck is 6.6 on 10/13. She was transitioned off an insulin drip.   - on Lantus, increased to 8 units every morning 10/14  - Medium SSI   - continue mod cho    DVT Prophylaxis: Warfarin and Pneumatic Compression Devices  Code Status: Full Code    Disposition: Expected discharge in 2 days once recovered from AVN ablation/PPM 10/16.    Francisca Jeong MD    Interval History   No new complaints. Denies dizziness, chest pain, SOB. Trying to increase po intake fluids, previously someone had told her to restrict fluid intake.    -Data reviewed today: I reviewed all new labs and imaging results over the last 24 hours. I personally reviewed no images or EKG's today.    Physical Exam   Temp: 99.1  F (37.3  C) Temp src: Oral BP: 96/54 (MD aware, states not to hold BP meds) Pulse: 120 Heart Rate: 122 Resp: 16 SpO2: 95 % O2 Device: None (Room air)    Vitals:    10/13/17 0755 10/14/17 0500 10/15/17 0700   Weight: 65 kg (143 lb 4.8 oz) 64.2 kg (141 lb 8.6 oz) 63.9 kg (140 lb 14 oz)     Vital Signs with Ranges  Temp:  [96  F (35.6  C)-99.4  F (37.4  C)] 99.1  F (37.3  C)  Pulse:  [118-122] 120  Heart Rate:  [114-140] 122  Resp:  [16-18] 16  BP: ()/(48-72) 96/54  SpO2:  [94 %-99 %] 95 %  I/O last 3 completed shifts:  In: 2597.5 [P.O.:1340; I.V.:462.5; IV Piggyback:500]  Out: 100 [Urine:100]    Constitutional: Awake, alert, cooperative, no apparent distress  Respiratory: Clear to auscultation bilaterally, no  crackles or wheezing  Cardiovascular: Irregular rhythm, tachy, normal S1 and S2, and no murmur noted  GI: Normal bowel sounds, soft, non-distended, non-tender  Skin/Integumen: No rashes, no cyanosis, no edema    Medications     Warfarin Therapy Reminder       IV fluid REPLACEMENT ONLY         [START ON 10/16/2017] amiodarone  200 mg Oral Daily     metoprolol  25 mg Oral BID     insulin glargine  8 Units Subcutaneous QAM AC     pramipexole  0.125 mg Oral TID     insulin aspart  1-7 Units Subcutaneous TID AC     insulin aspart  1-5 Units Subcutaneous At Bedtime     pantoprazole  40 mg Oral QAM     ipratropium - albuterol 0.5 mg/2.5 mg/3 mL  1 vial Nebulization TID     pentoxifylline  400 mg Oral Daily     sodium chloride (PF)  10 mL Intracatheter Q8H     cholecalciferol  5,000 Units Oral Daily     - MEDICATION INSTRUCTIONS for Dialysis Patients -   Does not apply See Admin Instructions       Data     Recent Labs  Lab 10/15/17  0748 10/15/17  0700 10/14/17  0539 10/13/17  0644 10/12/17  1040 10/12/17  0456 10/11/17  0420   WBC  --   --  7.4  --   --  9.4 8.4   HGB 10.4*  --  8.7*  --   --  8.1* 8.6*   MCV  --   --  92  --   --  91 90   PLT  --   --  205  --   --  161 152   INR  --  1.29* 1.27* 1.19*  --  1.21* 1.08   NA  --  144 137  --   --  142 145*   POTASSIUM  --  3.9 3.8  --   --  3.7 3.7   CHLORIDE  --  111* 104  --   --  107 110*   CO2  --  25 27  --   --  29 28   BUN  --  18 12  --   --  12 18   CR  --  3.69* 2.65*  --   --  2.79* 3.68*   ANIONGAP  --  8 6  --   --  6 7   MALICK  --  7.7* 8.0*  --   --  7.4* 7.8*   GLC  --  94 263* 174*  --  162* 139*   ALBUMIN  --   --   --   --   --  2.6* 1.9*   PROTTOTAL  --   --   --   --  5.1* 5.0* 4.9*   BILITOTAL  --   --   --   --   --  0.4 0.3   ALKPHOS  --   --   --   --   --  128 145   ALT  --   --   --   --   --  8 9   AST  --   --   --   --   --  9 12       No results found for this or any previous visit (from the past 24 hour(s)).

## 2017-10-15 NOTE — PLAN OF CARE
Problem: Patient Care Overview  Goal: Plan of Care/Patient Progress Review  Outcome: No Change  HR tachycardic, irregular.  Low grade tepmperature of 99.4.  Other vss.  Pt is alert and oriented.  LS diminished.  BS present, passing flatus.  Pt is up with 1 to BSC.  intermittently using bipap overnight.  Denies pain.  PICC in RUE.  R subclavian dialysis catheter in place.  Tele-afib w/RVR.  Pt on coumadin, amiodarone and metoprolol.  Pt received 1 unit PRBC's yesterday and a 500 cc fluid bolus for tachycardia and hypotension.  Will continue to monitor.

## 2017-10-15 NOTE — PLAN OF CARE
Problem: Patient Care Overview  Goal: Plan of Care/Patient Progress Review  OT: Attempted to see pt for treatment, pt getting BS tested and food just arrived, pt reports she needs to eat at this time. RN reports HR has continued to be high, was ok to attempt activity with close monitoring of HR and BP.

## 2017-10-15 NOTE — PROGRESS NOTES
Md updated about asymmetrical pupils with right being larger and sluggish, Dr. Jeong was just in seeing patient and didn't note any neurologic concerns, states to monitor for now.

## 2017-10-15 NOTE — PROGRESS NOTES
Met with patient and spouse, Christian. Explained that therapies are recommending TCU now and not LTACH. She is currently on RA, no oxygen. Patient would like [facility] as 1st choice and [facility] in [place]. Relayed info to SW at this time.

## 2017-10-15 NOTE — PLAN OF CARE
Problem: Patient Care Overview  Goal: Plan of Care/Patient Progress Review  VSS, BP improved since this AM, HR 110s now, was 130s on oral amio and metoprolol, Dr. Peter put in orders for EKG and states to continue to monitor and hopefully amiodarone continues to work.  Also, ok per MD to give coumadin tonight as patient may not have ablation tomorrow. Up with assist of one and gait belt, patient denies pain at this time.  Continue to monitor.

## 2017-10-15 NOTE — PLAN OF CARE
Problem: Patient Care Overview  Goal: Plan of Care/Patient Progress Review  Discharge Planner PT   Patient plan for discharge: TCU  Current status: Pt sleeping on PT entry, rouses to voice and agreeable to participate. Pt with good tolerance for seated and supine LE and core exercises. Pt ambulates 3x2' with FWW and CGA x2; ambulates 2x12' with FWW, CGAx1 and chair follow. HR max with activity 121, majority of time pt's -118, reduces to 90s-100s with seated rest. Post-activity vitals: /64, , O2 sats 95% on RA.  Barriers to return to prior living situation: current level of assist, falls risk, decreased activity tolerance, HR  Recommendations for discharge: TCU  Rationale for recommendations: pt would benefit from continued daily therapies and increased level of assist with mobility to maximize IND, safety and tolerance towards PLOF before return home.       Entered by: Emy Daniel 10/15/2017 3:51 PM

## 2017-10-15 NOTE — PROGRESS NOTES
A&Ox4. Up with one to chair and bedside commode. Slightly hypotensive at times.  A-fib 90's-110's. 1 unit of blood given. No reaction noted. +1 BLE edema. BG at bedtime 208. Two watery stools this shift. No urine output. Dialysis pt.

## 2017-10-15 NOTE — PROGRESS NOTES
SW  D: Per pt.'s conversation with Care Coordinator, referrals were faxed to [facility] and [facility].  P:  will continue to follow to assist with d/c planning.

## 2017-10-15 NOTE — PLAN OF CARE
Problem: Patient Care Overview  Goal: Plan of Care/Patient Progress Review  Discharge Planner OT   Patient plan for discharge: did not state  Current status:  Pt went from sit<>stand  with CGA. Pt ambulated to the bathroom with CGA and transferred to the toilet with CGA/minimum assist. Pt completed toileting while standing with steadying assist for balance. Pt completed hygiene task while sitting reporting fatigue.   Barriers to return to prior living situation: current impaired activity tolerance      Recommendations for discharge: TCU  Rationale for recommendations: given prolonged bedrest pt below functional baseline and will benefit from continued daily therapies at rehab facility prior to return home        Entered by: Valeriano Cristina 10/15/2017 4:18 PM

## 2017-10-15 NOTE — PROGRESS NOTES
[facility]    Cardiology Progress Note     Sheri Peter MD    INTERVAL HISTORY:  She received a unit of blood and a small bolus of IV fluids yesterday. With this, she feels like she has more energy, her blood pressures improved to 119/64 mmHg, and her atrial fibrillation rate control although not ideal, has markedly improved from 140-150 BPM to 110-120 BPM. In fact, we have been able to add low-dose metoprolol titrate 25 mg b.i.d. while she continues on oral amiodarone load.    VITAL SIGNS:  Temp 90 8.8, /64, heart rate 80 bpm, respiratory rate 16/m saturations 96% on room air, weight 65 kg.    PHYSICAL EXAMINATION:  Constitutional: Comfortably lying in bed. It seems more alert and less dry today. Eyes: No pallor or icterus or xanthelasma.  Respiratory: Normal respiratory effort.  Bilateral normal breath sounds.  No rales or wheeze.  Cardiovascular: Heart sounds are irregularly irregular, 122 BPM, no audible murmur.   Neuropsychiatric: Alert, oriented ×3.  No gross motor deficit.  Normal mood and affect.  Extremities: No lower extremity edema.       ECG done today shows atrial fibrillation with a ventricular rate of 100 BPM,  ms.    LABS: Calcium 3.9, creatinine 3.69 (dialysis dependent), BUN 18, hemoglobin 10.4.    DIAGNOSES:  1. Paroxysmal atrial fibrillation with rapid ventricular rates, rate controlled improving with the blood transfusion, and the amiodarone/metoprolol combination.  2. Status post atrial flutter ablation and cardioversion in June 2017.  3. Stable coronary artery disease status post CABG X 5.  4. Sepsis secondary to pneumonia, resolved.  5. Acute kidney injury secondary to #4. Currently requiring hemodialysis.       ASSESSMENT/PLAN:  She has significantly improved following a fluid bolus and blood transfusion yesterday. Afib rate control is also improving. Blood pressure is better. Tolerating addition of metoprolol titrate 25 mg b.i.d. Daily for dialysis  tomorrow.    1. Continue oral amiodarone 200 mg b.i.d. For 5 more days followed by 200 mg daily. Recent TSH normal.  2. Continue metoprolol tartrate 25 mg twice daily. If she does not tolerate this well, we can back off.   3. Continue warfarin anticoagulation.   4. EP consult on Monday, 10/16 to discuss possible AV jacqueline ablation and pacemaker implantation.       Sheri Peter MD

## 2017-10-16 ENCOUNTER — APPOINTMENT (OUTPATIENT)
Dept: PHYSICAL THERAPY | Facility: CLINIC | Age: 77
DRG: 871 | End: 2017-10-16
Attending: INTERNAL MEDICINE
Payer: MEDICARE

## 2017-10-16 LAB
ALBUMIN SERPL-MCNC: 2.2 G/DL (ref 3.4–5)
ANION GAP SERPL CALCULATED.3IONS-SCNC: 10 MMOL/L (ref 3–14)
BACTERIA SPEC CULT: NO GROWTH
BUN SERPL-MCNC: 26 MG/DL (ref 7–30)
CALCIUM SERPL-MCNC: 7.4 MG/DL (ref 8.5–10.1)
CHLORIDE SERPL-SCNC: 111 MMOL/L (ref 94–109)
CO2 SERPL-SCNC: 22 MMOL/L (ref 20–32)
CREAT SERPL-MCNC: 4.51 MG/DL (ref 0.52–1.04)
GFR SERPL CREATININE-BSD FRML MDRD: 9 ML/MIN/1.7M2
GLUCOSE BLDC GLUCOMTR-MCNC: 104 MG/DL (ref 70–99)
GLUCOSE BLDC GLUCOMTR-MCNC: 105 MG/DL (ref 70–99)
GLUCOSE BLDC GLUCOMTR-MCNC: 112 MG/DL (ref 70–99)
GLUCOSE BLDC GLUCOMTR-MCNC: 127 MG/DL (ref 70–99)
GLUCOSE BLDC GLUCOMTR-MCNC: 79 MG/DL (ref 70–99)
GLUCOSE SERPL-MCNC: 102 MG/DL (ref 70–99)
HGB BLD-MCNC: 9.6 G/DL (ref 11.7–15.7)
INR PPP: 1.54 (ref 0.86–1.14)
PHOSPHATE SERPL-MCNC: 4.9 MG/DL (ref 2.5–4.5)
POTASSIUM SERPL-SCNC: 4.3 MMOL/L (ref 3.4–5.3)
SODIUM SERPL-SCNC: 143 MMOL/L (ref 133–144)
SPECIMEN SOURCE: NORMAL

## 2017-10-16 PROCEDURE — 80069 RENAL FUNCTION PANEL: CPT | Performed by: INTERNAL MEDICINE

## 2017-10-16 PROCEDURE — 25000125 ZZHC RX 250: Performed by: SURGERY

## 2017-10-16 PROCEDURE — 25000132 ZZH RX MED GY IP 250 OP 250 PS 637: Mod: GY | Performed by: SURGERY

## 2017-10-16 PROCEDURE — 25000132 ZZH RX MED GY IP 250 OP 250 PS 637: Mod: GY | Performed by: INTERNAL MEDICINE

## 2017-10-16 PROCEDURE — 21000001 ZZH R&B HEART CARE

## 2017-10-16 PROCEDURE — A9270 NON-COVERED ITEM OR SERVICE: HCPCS | Mod: GY | Performed by: INTERNAL MEDICINE

## 2017-10-16 PROCEDURE — 94640 AIRWAY INHALATION TREATMENT: CPT | Mod: 76

## 2017-10-16 PROCEDURE — 40000239 ZZH STATISTIC VAT ROUNDS

## 2017-10-16 PROCEDURE — A9270 NON-COVERED ITEM OR SERVICE: HCPCS | Mod: GY | Performed by: NURSE PRACTITIONER

## 2017-10-16 PROCEDURE — 99207 ZZC MOONLIGHTING INDICATOR: CPT | Performed by: INTERNAL MEDICINE

## 2017-10-16 PROCEDURE — 85018 HEMOGLOBIN: CPT | Performed by: INTERNAL MEDICINE

## 2017-10-16 PROCEDURE — 97116 GAIT TRAINING THERAPY: CPT | Mod: GP | Performed by: PHYSICAL THERAPIST

## 2017-10-16 PROCEDURE — 25000128 H RX IP 250 OP 636: Performed by: INTERNAL MEDICINE

## 2017-10-16 PROCEDURE — 25000132 ZZH RX MED GY IP 250 OP 250 PS 637: Mod: GY | Performed by: NURSE PRACTITIONER

## 2017-10-16 PROCEDURE — 40000193 ZZH STATISTIC PT WARD VISIT: Performed by: PHYSICAL THERAPIST

## 2017-10-16 PROCEDURE — 00000146 ZZHCL STATISTIC GLUCOSE BY METER IP

## 2017-10-16 PROCEDURE — A9270 NON-COVERED ITEM OR SERVICE: HCPCS | Mod: GY | Performed by: SURGERY

## 2017-10-16 PROCEDURE — 99232 SBSQ HOSP IP/OBS MODERATE 35: CPT | Performed by: INTERNAL MEDICINE

## 2017-10-16 PROCEDURE — 97530 THERAPEUTIC ACTIVITIES: CPT | Mod: GP | Performed by: PHYSICAL THERAPIST

## 2017-10-16 PROCEDURE — 90937 HEMODIALYSIS REPEATED EVAL: CPT

## 2017-10-16 PROCEDURE — 97110 THERAPEUTIC EXERCISES: CPT | Mod: GP | Performed by: PHYSICAL THERAPIST

## 2017-10-16 PROCEDURE — 40000275 ZZH STATISTIC RCP TIME EA 10 MIN

## 2017-10-16 PROCEDURE — 94640 AIRWAY INHALATION TREATMENT: CPT

## 2017-10-16 PROCEDURE — 25000131 ZZH RX MED GY IP 250 OP 636 PS 637: Mod: GY | Performed by: INTERNAL MEDICINE

## 2017-10-16 PROCEDURE — 85610 PROTHROMBIN TIME: CPT | Performed by: INTERNAL MEDICINE

## 2017-10-16 RX ORDER — HEPARIN SODIUM 1000 [USP'U]/ML
500 INJECTION, SOLUTION INTRAVENOUS; SUBCUTANEOUS
Status: COMPLETED | OUTPATIENT
Start: 2017-10-16 | End: 2017-10-16

## 2017-10-16 RX ORDER — AMIODARONE HYDROCHLORIDE 200 MG/1
400 TABLET ORAL DAILY
Status: COMPLETED | OUTPATIENT
Start: 2017-10-16 | End: 2017-10-16

## 2017-10-16 RX ORDER — AMIODARONE HYDROCHLORIDE 200 MG/1
400 TABLET ORAL 2 TIMES DAILY
Status: DISCONTINUED | OUTPATIENT
Start: 2017-10-17 | End: 2017-10-18 | Stop reason: HOSPADM

## 2017-10-16 RX ORDER — HEPARIN SODIUM 1000 [USP'U]/ML
500 INJECTION, SOLUTION INTRAVENOUS; SUBCUTANEOUS CONTINUOUS
Status: DISCONTINUED | OUTPATIENT
Start: 2017-10-16 | End: 2017-10-16

## 2017-10-16 RX ORDER — AMIODARONE HYDROCHLORIDE 200 MG/1
400 TABLET ORAL 2 TIMES DAILY
Status: DISCONTINUED | OUTPATIENT
Start: 2017-10-16 | End: 2017-10-16

## 2017-10-16 RX ORDER — WARFARIN SODIUM 3 MG/1
3 TABLET ORAL
Status: COMPLETED | OUTPATIENT
Start: 2017-10-16 | End: 2017-10-16

## 2017-10-16 RX ORDER — WARFARIN SODIUM 4 MG/1
4 TABLET ORAL
Status: DISCONTINUED | OUTPATIENT
Start: 2017-10-16 | End: 2017-10-16

## 2017-10-16 RX ORDER — ALBUMIN (HUMAN) 12.5 G/50ML
50 SOLUTION INTRAVENOUS
Status: DISCONTINUED | OUTPATIENT
Start: 2017-10-16 | End: 2017-10-16

## 2017-10-16 RX ADMIN — IPRATROPIUM BROMIDE AND ALBUTEROL SULFATE 3 ML: .5; 3 SOLUTION RESPIRATORY (INHALATION) at 19:01

## 2017-10-16 RX ADMIN — PRAMIPEXOLE DIHYDROCHLORIDE 0.12 MG: 0.12 TABLET ORAL at 08:47

## 2017-10-16 RX ADMIN — PENTOXIFYLLINE 400 MG: 400 TABLET, FILM COATED, EXTENDED RELEASE ORAL at 08:47

## 2017-10-16 RX ADMIN — IPRATROPIUM BROMIDE AND ALBUTEROL SULFATE 3 ML: .5; 3 SOLUTION RESPIRATORY (INHALATION) at 13:01

## 2017-10-16 RX ADMIN — PANTOPRAZOLE SODIUM 40 MG: 40 TABLET, DELAYED RELEASE ORAL at 08:47

## 2017-10-16 RX ADMIN — IPRATROPIUM BROMIDE AND ALBUTEROL SULFATE 3 ML: .5; 3 SOLUTION RESPIRATORY (INHALATION) at 07:54

## 2017-10-16 RX ADMIN — PRAMIPEXOLE DIHYDROCHLORIDE 0.12 MG: 0.12 TABLET ORAL at 21:28

## 2017-10-16 RX ADMIN — Medication 0.25 MG: at 08:31

## 2017-10-16 RX ADMIN — HEPARIN SODIUM 1600 UNITS: 1000 INJECTION, SOLUTION INTRAVENOUS; SUBCUTANEOUS at 11:23

## 2017-10-16 RX ADMIN — Medication 0.25 MG: at 21:28

## 2017-10-16 RX ADMIN — METOPROLOL TARTRATE 25 MG: 25 TABLET ORAL at 12:11

## 2017-10-16 RX ADMIN — METOPROLOL TARTRATE 25 MG: 25 TABLET ORAL at 20:07

## 2017-10-16 RX ADMIN — HEPARIN SODIUM 500 UNITS: 1000 INJECTION, SOLUTION INTRAVENOUS; SUBCUTANEOUS at 09:24

## 2017-10-16 RX ADMIN — SODIUM CHLORIDE 250 ML: 9 INJECTION, SOLUTION INTRAVENOUS at 09:23

## 2017-10-16 RX ADMIN — WARFARIN SODIUM 3 MG: 3 TABLET ORAL at 17:35

## 2017-10-16 RX ADMIN — PRAMIPEXOLE DIHYDROCHLORIDE 0.12 MG: 0.12 TABLET ORAL at 16:42

## 2017-10-16 RX ADMIN — HEPARIN SODIUM 1600 UNITS: 1000 INJECTION, SOLUTION INTRAVENOUS; SUBCUTANEOUS at 11:21

## 2017-10-16 RX ADMIN — INSULIN GLARGINE 8 UNITS: 100 INJECTION, SOLUTION SUBCUTANEOUS at 12:01

## 2017-10-16 RX ADMIN — AMIODARONE HYDROCHLORIDE 400 MG: 200 TABLET ORAL at 16:42

## 2017-10-16 RX ADMIN — CHOLECALCIFEROL CAP 125 MCG (5000 UNIT) 5000 UNITS: 125 CAP at 12:11

## 2017-10-16 RX ADMIN — HEPARIN SODIUM 500 UNITS/HR: 1000 INJECTION, SOLUTION INTRAVENOUS; SUBCUTANEOUS at 09:25

## 2017-10-16 NOTE — PLAN OF CARE
Problem: Patient Care Overview  Goal: Plan of Care/Patient Progress Review  A/O, VSS.  Pt cont in AFib, heart rate 108-115.  Up with 1 to BSC-voided 40 cc.  No stools overnight.  Bipap on overnight.  Plan for dialysis this morning.

## 2017-10-16 NOTE — PROGRESS NOTES
Pt is on room air with Sp02 of 96%. Lung sounds: clear/diminished. Neb tx given as schedule. Will continue to follow.  10/16/2017  Lesa Keller

## 2017-10-16 NOTE — PROGRESS NOTES
SW:  D:  Received a call back from [facility].  They can accept patient tomorrow.  They only have private rooms available with an amenity fee of $14 per day.   Per Kyle, patient has 24 pr 25 skilled days left in this benefit period.  Received a call back from  [facility].  They can accept patient tomorrow.  Patient's first choice would be [facility] in [place].  Call placed to check bed availability.  Awaiting a return call.    P:  Will continue to follow.

## 2017-10-16 NOTE — PLAN OF CARE
Problem: Patient Care Overview  Goal: Plan of Care/Patient Progress Review  Outcome: Improving  Heart rates in the low 100's. Denies pain. Up with assist x1 to the chair and bedside commode.

## 2017-10-16 NOTE — PLAN OF CARE
Problem: Patient Care Overview  Goal: Plan of Care/Patient Progress Review  Outcome: Improving  Patient Ax4, up with 1, and belt to chair.  Had dialysis treatment in room this morning, 1.5 L removed, patient tolerated well.  Denies pain.  Hgb 9.6 today.  BP stable.  HR up to 120's at times.  HR medications given after dialysis.  Given clonazepam per patient request prior to dialysis.  Creatinine 4.51, Phosphorus 4.9, K+ 4.3.  Patient eating well, sat in chair for lunch.  Then had PT.  Reports she feels tired this afternoon.  Lungs clear diminished.

## 2017-10-16 NOTE — PROGRESS NOTES
Electrophysiology Progress Note          Assessment and Plan:   Kathryn Banks is a 77-year-old female with a history of diastolic congestive heart failure, EF 60% to 65%, type 2 non-insulin dependent diabetes mellitus, paroxysmal atrial fibrillation and flutter (post CTI ablation by Dr. King 2017), coronary artery disease (status post 5-vessel coronary artery bypass graft in the remote past) who was admitted on 10/2/2017 from an outside hospital for evaluation and management of sepsis likely secondary to pneumonia.  Intermittent hemodialysis was initiated on 10/2/2017 for renal failure, hyperkalemia, and metabolic acidosis. Has undergone thoracentesis this visit.      ASSESSMENT:  Tele shows afib and atrial flutter with RVR (-130's at rest).  BP had been soft limiting rate controlling agents but is improving at 117/72.  Tolerating lopressor 25 mg BID and amiodarone 200 mg daily.  INR sub therapeutic at 1.34.  Notes no symptoms of CP, palpitation or SOB.  Currently off all antibiotics.      PLAN:  -Increase amiodarone to 400 mg BID.  Attempt to medically manage as AVNA and device implantation at this point carries increased risk of infection in the setting of recent sepsis, HD and underlying DMII.  -Re-assess BP after dialysis run today and see if BB can be uptitrated               Interval History:   Getting bed side dialysis, denies CP, palpitations and SOB, was up to the chair earlier in the AM              Review of Systems:   As per subjective, otherwise 5 systems reviewed and negative.           Physical Exam:   Blood pressure 117/72, pulse 128, temperature 98.4  F (36.9  C), temperature source Oral, resp. rate 14, weight 63.5 kg (139 lb 15.9 oz), SpO2 95 %, not currently breastfeeding.      Vital Sign Ranges  Temperature Temp  Av.9  F (37.2  C)  Min: 98.4  F (36.9  C)  Max: 99.5  F (37.5  C)   Blood pressure Systolic (24hrs), Av , Min:92 , Max:131        Diastolic (24hrs), Av, Min:45,  Max:72      Pulse Pulse  Av.5  Min: 80  Max: 142   Respirations Resp  Avg: 15.5  Min: 14  Max: 16   Pulse oximetry SpO2  Av %  Min: 95 %  Max: 100 %         Intake/Output Summary (Last 24 hours) at 10/16/17 1006  Last data filed at 10/16/17 0633   Gross per 24 hour   Intake              240 ml   Output              215 ml   Net               25 ml       Wt Readings from Last 2 Encounters:   10/16/17 63.5 kg (139 lb 15.9 oz)   10/01/17 77.3 kg (170 lb 8 oz)      Constitutional:   NAD   Skin:   Warm and dry   Head:   Nontraumatic   Lungs:   Diminished but clear to anterior auscultation   Cardiovascular:   S1, S2, irregularly, irregular, fast   Neurological:   Grossly nonfocal            Medications:   Reviewed             Data:     Results for orders placed or performed during the hospital encounter of 10/02/17 (from the past 24 hour(s))   Glucose by meter   Result Value Ref Range    Glucose 141 (H) 70 - 99 mg/dL   EKG 12-lead, tracing only   Result Value Ref Range    Interpretation ECG Click View Image link to view waveform and result    Glucose by meter   Result Value Ref Range    Glucose 133 (H) 70 - 99 mg/dL   Glucose by meter   Result Value Ref Range    Glucose 272 (H) 70 - 99 mg/dL   Glucose by meter   Result Value Ref Range    Glucose 105 (H) 70 - 99 mg/dL   INR   Result Value Ref Range    INR 1.54 (H) 0.86 - 1.14   Renal panel   Result Value Ref Range    Sodium 143 133 - 144 mmol/L    Potassium 4.3 3.4 - 5.3 mmol/L    Chloride 111 (H) 94 - 109 mmol/L    Carbon Dioxide 22 20 - 32 mmol/L    Anion Gap 10 3 - 14 mmol/L    Glucose 102 (H) 70 - 99 mg/dL    Urea Nitrogen 26 7 - 30 mg/dL    Creatinine 4.51 (H) 0.52 - 1.04 mg/dL    GFR Estimate 9 (L) >60 mL/min/1.7m2    GFR Estimate If Black 11 (L) >60 mL/min/1.7m2    Calcium 7.4 (L) 8.5 - 10.1 mg/dL    Phosphorus 4.9 (H) 2.5 - 4.5 mg/dL    Albumin 2.2 (L) 3.4 - 5.0 g/dL   Glucose by meter   Result Value Ref Range    Glucose 112 (H) 70 - 99 mg/dL     *Note:  Due to a large number of results and/or encounters for the requested time period, some results have not been displayed. A complete set of results can be found in Results Review.

## 2017-10-16 NOTE — PROGRESS NOTES
[facility]  Hospitalist Progress Note  Date of service (when I saw the patient) 10/16/2017:         Assessment and Plan:   Ms Kathryn Banks  is a 77-year-old female with a history of diastolic congestive heart failure, EF 60% to 65%, type 2 non-insulin dependent diabetes mellitus, hemoglobin A1c of 7.6, paroxysmal atrial fibrillation, coronary artery disease, status post 5-vessel coronary artery bypass graft in the remote past who was admitted on 10/2/2017 from an outside hospital for evaluation and management of sepsis likely secondary to pneumonia. Upon arrival she was requiring vasopressor support, which has been weaned off. Intermittent hemodialysis was initiated on 10/2/2017 out of concern for acute on chronic renal failure, hyperkalemia, and metabolic acidosis. Her oxygenation has improved and she is currently on HF nasal cannula. Chest CT on 10/10/2017 noted continued bilateral pleural effusions, left greater than right.     Transfered out of ICU on 10/12.     Acute hypoxic respiratory failure:  - Secondary to suspected pneumonia and volume overload issues related to decompensated diastolic congestive heart failure, Rapid A. fib. Requiring hemodialysis and had thoracentesis for 2 liters. Completed a course of meropenem and vancomycin for possible sepsis with procalcitonin coming down (1.70 on 10/12).   - on room air  - Continue BiPAP at night for ANABEL.       - continue with hemodialysis per Nephrology.        Sepsis secondary to pneumonia:   - although her urinalysis was positive on 10/01 but this appears to have been adequately treated.   - Abx course completed as above. Also required a course of vancomycin for C. diff, but last stool on 10/02 was negative.    - BC 10/14 NTD  - remains on enteric isolation as she is still having some diarrhea.         Paroxysmal Atrial fibrillation with rapid ventricular rate:   - s/p ablation & cardioversion in 6/2017  - rate better   - cardiology following  started po amiodarone 10/14, repeat limited TTE 10/16 for evaluation of tachycardiomyopathy  - EP consult today for possible AV node ablation & Pacemaker placement  - chronically anticoagulated on Coumadin with Rph dosing, INR subtherapeutic 1.54 today.    - Given PRBC x 1 10/14 for hgb 8.7, cards request to have Hgb>10 for procedure 10/15  - Bld cx per cardiology pending  - Hgb on 10/1510.4, repeat today  - BP improved 10/14 with 500ml fluid bolus & PRBC     - tolerating Lopressor 25mg po BID     Acute encephalopathy secondary to ICU delirium, sepsis: Improved.  - Conservative management of pain meds and benzodiazepines  - minimize use of benzodiazepines  - consider antipsychotics if acutely agitated (seroquel, zyprexa)        History of diabetes mellitus type 2:   - not on insulin at home, recent hemoglobin A1c was 7.3 in June.  Recheck is 6.6 on 10/13. She was transitioned off an insulin drip.   - on Lantus, increased to 8 units every morning 10/14  - Medium SSI   - continue mod cho     DVT Prophylaxis: Warfarin and Pneumatic Compression Devices  Code Status: Full Code     Disposition: Expected discharge in 2 days once recovered from AVN ablation/PPM 10/16.    Guadalupe Noriega MD.  Hospitalist [phone] (7am -6 pm)                 Interval History:   Feeling better, denies cough, adb pain.   had 1 watery stool last night. Making some urine. Tolerating reg diet              Medications:       sodium chloride 0.9%  250 mL Hemodialysis Machine Once     sodium chloride 0.9%  250-1,000 mL Intravenous Once in dialysis     heparin (porcine)  500 Units Hemodialysis Machine Once in dialysis     amiodarone  200 mg Oral Daily     metoprolol  25 mg Oral BID     insulin glargine  8 Units Subcutaneous QAM AC     pramipexole  0.125 mg Oral TID     insulin aspart  1-7 Units Subcutaneous TID AC     insulin aspart  1-5 Units Subcutaneous At Bedtime     pantoprazole  40 mg Oral QAM     ipratropium - albuterol 0.5 mg/2.5 mg/3 mL   1 vial Nebulization TID     pentoxifylline  400 mg Oral Daily     sodium chloride (PF)  10 mL Intracatheter Q8H     cholecalciferol  5,000 Units Oral Daily     - MEDICATION INSTRUCTIONS for Dialysis Patients -   Does not apply See Admin Instructions     sodium chloride 0.9%, albumin human, heparin, heparin, miconazole, potassium chloride, potassium chloride, magnesium sulfate, potassium phosphate (KPHOS) in D5W IV, potassium phosphate (KPHOS) in D5W IV, potassium phosphate (KPHOS) in D5W IV, potassium phosphate (KPHOS) in D5W IV, Warfarin Therapy Reminder, clonazePAM, metoprolol, hydrALAZINE, lidocaine 4%, sodium chloride (PF), HYDROmorphone, acetaminophen, naloxone, albuterol, IV fluid REPLACEMENT ONLY, glucose **OR** dextrose **OR** glucagon, lidocaine               Physical Exam:   Blood pressure 125/70, pulse 80, temperature 99.5  F (37.5  C), temperature source Oral, resp. rate 16, weight 63.5 kg (139 lb 15.9 oz), SpO2 97 %, not currently breastfeeding.  Wt Readings from Last 4 Encounters:   10/16/17 63.5 kg (139 lb 15.9 oz)   10/01/17 77.3 kg (170 lb 8 oz)   17 77.1 kg (170 lb)   17 76.2 kg (168 lb)         Vital Sign Ranges  Temperature Temp  Av.1  F (37.3  C)  Min: 98.8  F (37.1  C)  Max: 99.5  F (37.5  C)   Blood pressure Systolic (24hrs), Av , Min:96 , Max:125        Diastolic (24hrs), Av, Min:54, Max:70      Pulse Pulse  Av  Min: 80  Max: 120   Respirations Resp  Av  Min: 16  Max: 16   Pulse oximetry SpO2  Av.2 %  Min: 95 %  Max: 100 %         Intake/Output Summary (Last 24 hours) at 10/16/17 0833  Last data filed at 10/16/17 0633   Gross per 24 hour   Intake              365 ml   Output              215 ml   Net              150 ml       Constitutional: Awake, alert, cooperative, no apparent distress   Lungs: Clear to auscultation bilaterally, no crackles or wheezing   Cardiovascular: Regular rate and rhythm, normal S1 and S2, and no murmur noted   Abdomen:  Normal bowel sounds, soft, non-distended, non-tender   Skin: No rashes, no cyanosis, no edema   Neuro:                Data:          Lab Results   Component Value Date     10/16/2017     10/15/2017     10/14/2017    Lab Results   Component Value Date    CHLORIDE 111 10/16/2017    CHLORIDE 111 10/15/2017    CHLORIDE 104 10/14/2017    Lab Results   Component Value Date    BUN 26 10/16/2017    BUN 18 10/15/2017    BUN 12 10/14/2017      Lab Results   Component Value Date    POTASSIUM 4.3 10/16/2017    POTASSIUM 3.9 10/15/2017    POTASSIUM 3.8 10/14/2017    Lab Results   Component Value Date    CO2 22 10/16/2017    CO2 25 10/15/2017    CO2 27 10/14/2017    Lab Results   Component Value Date    CR 4.51 10/16/2017    CR 3.69 10/15/2017    CR 2.65 10/14/2017        Lab Results   Component Value Date     (H) 10/16/2017    GLC 94 10/15/2017     (H) 10/14/2017

## 2017-10-16 NOTE — PLAN OF CARE
Problem: Patient Care Overview  Goal: Plan of Care/Patient Progress Review  OT: pt was just starting dialysis in room when OT attempted for session.

## 2017-10-16 NOTE — PROGRESS NOTES
Renal Medicine Inpatient Dialysis Note                                Kathryn Banks MRN# [id number]   Age: 77 year old YOB: 1940   Date of Admission: 10/2/2017 Hospital LOS: 14          Assessment/Plan:     Admitted in setting of presumed sepsis.  Course complicated by respiratory failure,  Afib with RVR and encephalopathy    Dialysis initiated 10/02/17    1.  ESRD   -tunneled line   -UF goal 2 to 3 liter  2.  Anemia  3.  Secondary hyperparathyroidism  4.  Afib    Continue MWF schedule  Begin arrangement for outpatient dialysis  Consider vascular surgery evaluation for AVF      Interval History:     Seen while on run.  Dialysis parameters reviewed with dialysis RN  IJ tunneled line at 400 ml/min.  2 liter UF.  4 K bath    No patient complaints.  Stable run    ROS     GENERAL: NAD, No fever,chills  R: NEGATIVE for significant cough or SOB  CV: NEGATIVE for chest pain, palpitations  EXT: no change edema  ROS otherwise negative    Dialysis Parameters:     Vitals were reviewed  Patient Vitals for the past 8 hrs:   BP Temp Temp src Pulse Heart Rate Resp SpO2 Weight   10/16/17 1045 105/82 - - 138 - - - -   10/16/17 1030 118/64 - - 132 - - - -   10/16/17 1015 113/77 - - 129 - - - -   10/16/17 1000 117/72 - - 128 - - - -   10/16/17 0945 122/71 - - 122 - - - -   10/16/17 0930 99/68 - - 142 - - - -   10/16/17 0915 113/63 - - 125 - - - -   10/16/17 0900 131/62 - - 122 - - - -   10/16/17 0845 111/63 - - 133 - - - -   10/16/17 0826 113/68 - - 120 - - - -   10/16/17 0815 92/45 98.4  F (36.9  C) Oral - 128 14 95 % 63.5 kg (139 lb 15.9 oz)   10/16/17 0754 - - - - - - 97 % -   10/16/17 0634 - - - - - - - 63.5 kg (139 lb 15.9 oz)   10/16/17 0551 - - - - 108 - 99 % -     I/O last 3 completed shifts:  In: 365 [P.O.:365]  Out: 215 [Urine:215]    Vitals:    10/13/17 0755 10/14/17 0500 10/15/17 0700 10/16/17 0634   Weight: 65 kg (143 lb 4.8 oz) 64.2 kg (141 lb 8.6 oz) 63.9 kg (140 lb 14 oz) 63.5 kg (139 lb 15.9 oz)     10/16/17 0815   Weight: 63.5 kg (139 lb 15.9 oz)       Current Weight: 63.5  Dry Weight: 61 range  Dialysis Temp: 36.5  C  Access Device: IJ tunnled  Access Site: right  Dialyzer: Revaclear  Dialysis Bath: 4  Sodium Profile: n  UF Goal: 2  Blood Flow Rate (mL/min): 400  Total Treatment Time (hrs): 3  Heparin: Low dose as required      EPO dose: n  Zemplar: n  IV Fe: n      Medications and Allergies:     Reviewed      Physical Exam:     Seen and examined during course of dialysis run    /82  Pulse 138  Temp 98.4  F (36.9  C) (Oral)  Resp 14  Wt 63.5 kg (139 lb 15.9 oz)  SpO2 95%  BMI 24.03 kg/m2    GENERAL: awake, alert, follows  HEENT: NC/AT, PERRLA, EOMI, non icteric, pharynx moist without lesion  NECK: supple, no masses or adenopathy  RESP: symmetric excursion, good effort, lungs clear - no rales, rhonchi or wheezes  CV: RRR, normal S1 S2  ABDOMEN: S/NT, BS present  MS: no edema  SKIN: catheter site clean without drainage  NEURO: speech normal and cranial nerves 2-12 intact  PSYCH: affect normal/bright    Data:         Recent Labs  Lab 10/16/17  0545      POTASSIUM 4.3   CHLORIDE 111*   CO2 22   ANIONGAP 10   *   BUN 26   CR 4.51*   GFRESTIMATED 9*   GFRESTBLACK 11*   MALICK 7.4*     Recent Labs   Lab Test  10/16/17   0545  10/15/17   0700  10/14/17   0539  10/12/17   0456  10/11/17   0420  10/10/17   0409  10/09/17   0441  10/08/17   0411  10/07/17   1825  10/07/17   0442   CR  4.51*  3.69*  2.65*  2.79*  3.68*  2.80*  3.98*  3.28*  2.93*  3.51*       Recent Labs  Lab 10/16/17  0545 10/12/17  0456 10/11/17  0420 10/10/17  0409   ALBUMIN 2.2* 2.6* 1.9* 2.0*       Recent Labs  Lab 10/16/17  1020 10/16/17  0545 10/15/17  0748 10/15/17  0700 10/14/17  0539 10/12/17  1600 10/12/17  0456  10/10/17  0409   PHOS  --  4.9*  --  3.8  --  3.9  --   --  2.0*   HGB 9.6*  --  10.4*  --  8.7*  --  8.1*  < > 8.5*   < > = values in this interval not displayed.      BEATRIZ Galeas    [facility] -  Nephrology  [phone]

## 2017-10-16 NOTE — PROGRESS NOTES
"CLINICAL NUTRITION SERVICES - REASSESSMENT NOTE      EVALUATION OF PROGRESS TOWARD GOALS   Diet:  Dialysis, Moderate CHO            Room Service with Assist    Chart reviewed  Pt currently receiving cares  Per NA, pt tolerating po  Currently eating lunch - sandwich, fruit, yogurt (waiting for hot cup of soup)  Flowsheets reflect intake of % meals over the past few days      NEW FINDINGS:   Dialysis today    EP consult ---> \"Attempt to medically manage as AVNA and device implantation at this point carries increased risk of infection in the setting of recent sepsis, HD and underlying DMII.\"    TCU at [facility]      Previous Goals (10/10):   Patient will continue to consume % of meals   Evaluation: Not met consistently    Previous Nutrition Diagnosis (10/10):   Inadequate oral intake related to improving appetite and intake as evidenced by gradually improvement in intake over the last week   Evaluation: Improving        CURRENT NUTRITION DIAGNOSIS  No nutrition diagnosis identified at this time       INTERVENTIONS  Recommendations / Nutrition Prescription  Dialysis, Moderate CHO diet    Implementation  No interventions at this time    Goals  Pt to consume 75% meals TID      MONITORING AND EVALUATION:  Progress towards goals will be monitored and evaluated per protocol and Practice Guidelines        "

## 2017-10-16 NOTE — PROGRESS NOTES
Potassium   Date Value Ref Range Status   10/16/2017 4.3 3.4 - 5.3 mmol/L Final   ]  Lab Results   Component Value Date    HGB 10.4 10/15/2017     Results for MARI HERNANDEZ (MRN [id number]) as of 10/16/2017 09:09   Ref. Range 10/6/2017 04:21   Hep B Surface Agn Latest Ref Range: NR^Nonreactive  Nonreactive   Hepatitis B Surface Antibody Latest Ref Range: <8.00 m[IU]/mL 42.50 (H)     Weight: 63.5 kg (139 lb 15.9 oz)    POST WT 62 kg  EDW  TBD kg    DIALYSIS PROCEDURE NOTE    Patient dialyzed for 3 hrs on a 4 K bath with a net fluid removal of 1.5 L.  A BFR of 400 ml/min was obtained via RIJ.  Patient was seen by  during treatment.  Total heparin received during treatment:  2000 units.  Meds given: None. Complications: None.      Procedure teaching done, questions answered.  Consent verified yes  See flowsheet in EPIC for further details and post assessment.    Machine water alarm in place and functioning.  Chlorine and chloramines checked on water system every 4 hours.  All safety checks done, air detector on, venous and arterial parameters set. Transducer protectors checked every 15 min. Personally verified previous hepatitis result from last patient run on machine.    Pt dialyzed bedside in her room.    Outpatient Dialysis TBD.    [facility]

## 2017-10-16 NOTE — PROGRESS NOTES
Received call from Benja from [facility]. There are no chair times at the [place] location but there is a chair time at the [place] location at [address] [facility] in [place], [place]. The patient would have dialysis MWF in the afternoon or evening. No exact time given.

## 2017-10-16 NOTE — PLAN OF CARE
Problem: Patient Care Overview  Goal: Plan of Care/Patient Progress Review  Discharge Planner PT   Patient plan for discharge: TCU  Current status: SBA for bed mobility; min/CGA for gait and transfers with a rolling walker; limited by significant fatigue today during session secondary to dialysis; BP low 95/62; only able to ambulate 40 feet in room; able to participate in supine ex's  Barriers to return to prior living situation:weakness; need for assist; poor activity tolerance; falls risk  Recommendations for discharge: TCU  Rationale for recommendations: maximize strength, activity tolerance, and independence with functional mobility        Entered by: Rosi Pugh 10/16/2017 2:42 PM

## 2017-10-17 ENCOUNTER — APPOINTMENT (OUTPATIENT)
Dept: OCCUPATIONAL THERAPY | Facility: CLINIC | Age: 77
DRG: 871 | End: 2017-10-17
Attending: INTERNAL MEDICINE
Payer: MEDICARE

## 2017-10-17 ENCOUNTER — APPOINTMENT (OUTPATIENT)
Dept: PHYSICAL THERAPY | Facility: CLINIC | Age: 77
DRG: 871 | End: 2017-10-17
Attending: INTERNAL MEDICINE
Payer: MEDICARE

## 2017-10-17 LAB
GLUCOSE BLDC GLUCOMTR-MCNC: 137 MG/DL (ref 70–99)
GLUCOSE BLDC GLUCOMTR-MCNC: 156 MG/DL (ref 70–99)
GLUCOSE BLDC GLUCOMTR-MCNC: 64 MG/DL (ref 70–99)
GLUCOSE BLDC GLUCOMTR-MCNC: 73 MG/DL (ref 70–99)
GLUCOSE BLDC GLUCOMTR-MCNC: 92 MG/DL (ref 70–99)
GLUCOSE BLDC GLUCOMTR-MCNC: 96 MG/DL (ref 70–99)
INR PPP: 1.51 (ref 0.86–1.14)
INTERPRETATION ECG - MUSE: NORMAL

## 2017-10-17 PROCEDURE — A9270 NON-COVERED ITEM OR SERVICE: HCPCS | Mod: GY | Performed by: SURGERY

## 2017-10-17 PROCEDURE — A9270 NON-COVERED ITEM OR SERVICE: HCPCS | Mod: GY | Performed by: INTERNAL MEDICINE

## 2017-10-17 PROCEDURE — 25000132 ZZH RX MED GY IP 250 OP 250 PS 637: Mod: GY | Performed by: INTERNAL MEDICINE

## 2017-10-17 PROCEDURE — 40000275 ZZH STATISTIC RCP TIME EA 10 MIN

## 2017-10-17 PROCEDURE — 85610 PROTHROMBIN TIME: CPT | Performed by: INTERNAL MEDICINE

## 2017-10-17 PROCEDURE — 97535 SELF CARE MNGMENT TRAINING: CPT | Mod: GO | Performed by: OCCUPATIONAL THERAPY ASSISTANT

## 2017-10-17 PROCEDURE — 25000132 ZZH RX MED GY IP 250 OP 250 PS 637: Mod: GY | Performed by: NURSE PRACTITIONER

## 2017-10-17 PROCEDURE — 97530 THERAPEUTIC ACTIVITIES: CPT | Mod: GO | Performed by: OCCUPATIONAL THERAPY ASSISTANT

## 2017-10-17 PROCEDURE — A9270 NON-COVERED ITEM OR SERVICE: HCPCS | Mod: GY | Performed by: NURSE PRACTITIONER

## 2017-10-17 PROCEDURE — 40000193 ZZH STATISTIC PT WARD VISIT

## 2017-10-17 PROCEDURE — 94640 AIRWAY INHALATION TREATMENT: CPT

## 2017-10-17 PROCEDURE — 97110 THERAPEUTIC EXERCISES: CPT | Mod: GP

## 2017-10-17 PROCEDURE — 25000125 ZZHC RX 250: Performed by: SURGERY

## 2017-10-17 PROCEDURE — 99232 SBSQ HOSP IP/OBS MODERATE 35: CPT | Performed by: INTERNAL MEDICINE

## 2017-10-17 PROCEDURE — 94640 AIRWAY INHALATION TREATMENT: CPT | Mod: 76

## 2017-10-17 PROCEDURE — 40000239 ZZH STATISTIC VAT ROUNDS

## 2017-10-17 PROCEDURE — 40000133 ZZH STATISTIC OT WARD VISIT: Performed by: OCCUPATIONAL THERAPY ASSISTANT

## 2017-10-17 PROCEDURE — 21000001 ZZH R&B HEART CARE

## 2017-10-17 PROCEDURE — 97116 GAIT TRAINING THERAPY: CPT | Mod: GP

## 2017-10-17 PROCEDURE — 25000132 ZZH RX MED GY IP 250 OP 250 PS 637: Mod: GY | Performed by: SURGERY

## 2017-10-17 PROCEDURE — 99211 OFF/OP EST MAY X REQ PHY/QHP: CPT

## 2017-10-17 PROCEDURE — 00000146 ZZHCL STATISTIC GLUCOSE BY METER IP

## 2017-10-17 RX ORDER — WARFARIN SODIUM 4 MG/1
4 TABLET ORAL
Status: COMPLETED | OUTPATIENT
Start: 2017-10-17 | End: 2017-10-17

## 2017-10-17 RX ADMIN — PRAMIPEXOLE DIHYDROCHLORIDE 0.12 MG: 0.12 TABLET ORAL at 16:13

## 2017-10-17 RX ADMIN — METOPROLOL TARTRATE 25 MG: 25 TABLET ORAL at 21:25

## 2017-10-17 RX ADMIN — AMIODARONE HYDROCHLORIDE 400 MG: 200 TABLET ORAL at 21:25

## 2017-10-17 RX ADMIN — PANTOPRAZOLE SODIUM 40 MG: 40 TABLET, DELAYED RELEASE ORAL at 09:24

## 2017-10-17 RX ADMIN — METOPROLOL TARTRATE 25 MG: 25 TABLET ORAL at 09:24

## 2017-10-17 RX ADMIN — CHOLECALCIFEROL CAP 125 MCG (5000 UNIT) 5000 UNITS: 125 CAP at 09:24

## 2017-10-17 RX ADMIN — AMIODARONE HYDROCHLORIDE 400 MG: 200 TABLET ORAL at 09:24

## 2017-10-17 RX ADMIN — IPRATROPIUM BROMIDE AND ALBUTEROL SULFATE 3 ML: .5; 3 SOLUTION RESPIRATORY (INHALATION) at 19:43

## 2017-10-17 RX ADMIN — PENTOXIFYLLINE 400 MG: 400 TABLET, FILM COATED, EXTENDED RELEASE ORAL at 09:24

## 2017-10-17 RX ADMIN — IPRATROPIUM BROMIDE AND ALBUTEROL SULFATE 3 ML: .5; 3 SOLUTION RESPIRATORY (INHALATION) at 08:42

## 2017-10-17 RX ADMIN — Medication 0.25 MG: at 21:50

## 2017-10-17 RX ADMIN — IPRATROPIUM BROMIDE AND ALBUTEROL SULFATE 3 ML: .5; 3 SOLUTION RESPIRATORY (INHALATION) at 13:12

## 2017-10-17 RX ADMIN — WARFARIN SODIUM 4 MG: 4 TABLET ORAL at 17:47

## 2017-10-17 RX ADMIN — INSULIN ASPART 1 UNITS: 100 INJECTION, SOLUTION INTRAVENOUS; SUBCUTANEOUS at 17:56

## 2017-10-17 RX ADMIN — PRAMIPEXOLE DIHYDROCHLORIDE 0.12 MG: 0.12 TABLET ORAL at 21:25

## 2017-10-17 RX ADMIN — PRAMIPEXOLE DIHYDROCHLORIDE 0.12 MG: 0.12 TABLET ORAL at 09:24

## 2017-10-17 NOTE — PROGRESS NOTES
Electrophysiology Progress Note          Assessment and Plan:   Kathryn Banks is a 77-year-old female with a history of HFpEF, EF 60% to 65%, type 2 non-insulin dependent diabetes mellitus, paroxysmal atrial fibrillation and flutter (post CTI ablation by Dr. King June 2017), coronary artery disease (status post 5-vessel coronary artery bypass graft in the remote past) who was admitted on 10/2/2017 from an outside hospital for evaluation and management of sepsis likely secondary to pneumonia.  Intermittent hemodialysis was initiated on 10/2/2017 for renal failure, hyperkalemia, and metabolic acidosis. Has undergone thoracentesis this visit.  Developed afib with RVR complicated by hypotension.      ASSESSMENT:  Afib has been difficult to control and BP had been soft limiting rate controlling agents so amiodarone was started IV on 10/12 (IV for 24 hours then was transitioned to 200 mg once daily) and increased yesterday to 400 mg BID due to persistent elevated heart rates.   Today, she is going in and out of SR and AF with CVR (currently in SR with HR 75).  Asymptomatic. On warfarin but INR is only 1.5. Currently off all antibiotics.  Will continue dialysis as an outpatient.       Echo this visit (10/3) showed an LVEF of 60-65%, normal left ventricular wall motion, RV mild to moderately dilated and systolic function mild to moderately reduced.  The right ventricular systolic pressure approximated at 42mmHg plus right atrial pressure.  Normal IVC (1.5-2.5cm) with <50% respiratory collapse; right atrial pressure estimated at 10-15mmHg.  Moderate (46-55mmHg) pulmonary hypertension.    PLAN:  -Continue amiodarone to 400 mg BID for today  -Follow up with [facility] team at the  of  as she had been following with them in the past                 Interval History:   Feeling well today, no complaints              Review of Systems:   As per subjective, otherwise 5 systems reviewed and negative.           Physical  Exam:   Blood pressure 109/56, pulse 123, temperature 98.6  F (37  C), temperature source Oral, resp. rate 16, weight 62.9 kg (138 lb 9.6 oz), SpO2 94 %, not currently breastfeeding.      Vital Sign Ranges  Temperature Temp  Av.8  F (37.1  C)  Min: 98.6  F (37  C)  Max: 99.3  F (37.4  C)   Blood pressure Systolic (24hrs), Av , Min:95 , Max:120        Diastolic (24hrs), Av, Min:56, Max:82      Pulse Pulse  Av.5  Min: 122  Max: 138   Respirations Resp  Avg: 15.7  Min: 14  Max: 16   Pulse oximetry SpO2  Av %  Min: 92 %  Max: 96 %         Intake/Output Summary (Last 24 hours) at 10/17/17 1008  Last data filed at 10/17/17 0400   Gross per 24 hour   Intake              730 ml   Output             1800 ml   Net            -1070 ml       Wt Readings from Last 2 Encounters:   10/17/17 62.9 kg (138 lb 9.6 oz)   10/01/17 77.3 kg (170 lb 8 oz)      Constitutional:   NAD   Skin:   Warm and dry   Head:   Nontraumatic   Lungs:   Diminished but CTAB   Cardiovascular:   S1, S2, RRR, np M/R/G   Neurological:   Grossly nonfocal            Medications:   Reviewed             Data:     Results for orders placed or performed during the hospital encounter of 10/02/17 (from the past 24 hour(s))   Hemoglobin   Result Value Ref Range    Hemoglobin 9.6 (L) 11.7 - 15.7 g/dL   Glucose by meter   Result Value Ref Range    Glucose 127 (H) 70 - 99 mg/dL   Glucose by meter   Result Value Ref Range    Glucose 79 70 - 99 mg/dL   Glucose by meter   Result Value Ref Range    Glucose 104 (H) 70 - 99 mg/dL   Glucose by meter   Result Value Ref Range    Glucose 64 (L) 70 - 99 mg/dL   Glucose by meter   Result Value Ref Range    Glucose 92 70 - 99 mg/dL   INR   Result Value Ref Range    INR 1.51 (H) 0.86 - 1.14   Glucose by meter   Result Value Ref Range    Glucose 73 70 - 99 mg/dL     *Note: Due to a large number of results and/or encounters for the requested time period, some results have not been displayed. A complete set of  results can be found in Results Review.

## 2017-10-17 NOTE — PROGRESS NOTES
Swati Received call from Ayleen at [facility] stating they have a chair time for the [facility] location of 2:30 p and that the patient should arrive at 2:00 PM. Let Ayleen know there was a message left for Benja to check locations closer to [place]. She stated the closes facility is at [place] and she would send the referral there as well. Will await call back from Ayleen.

## 2017-10-17 NOTE — PROGRESS NOTES
Renal Medicine       Dialyzed yesterday without event  She offers no complaints today    Arrangements underway for outpatient dialysis at [facility]  (plan initial run there 10/19/17)    TCU placement pending    Plan am dialysis here  Dialysis orders entered    Discussed plan with patient          Recent Labs  Lab 10/16/17  0545      POTASSIUM 4.3   CHLORIDE 111*   CO2 22   ANIONGAP 10   *   BUN 26   CR 4.51*   GFRESTIMATED 9*   GFRESTBLACK 11*   MALICK 7.4*           EMILIA Galeas    [facility]  [phone]

## 2017-10-17 NOTE — PLAN OF CARE
Spoke with Maxwell the referral was sent to [facility] but they are still reviewing the pt.

## 2017-10-17 NOTE — PROGRESS NOTES
Left message for Benja at ext [phone] at [facility] dialysis at [phone] to check for chair times at facilities closer to [facility] and [place] due to transportation issues.

## 2017-10-17 NOTE — PLAN OF CARE
Problem: Patient Care Overview  Goal: Individualization & Mutuality  Outcome: No Change  Pt is a/ox4. Denies any cp and sob.VSS on RA.pt. Was up in chair.had dinner.Assist to BSC with walker x1.Contact for c-diff.Amiodarone po 400 mg given as per order.Tele shows in and out of SR/ afib CVR.Education given on amiodarone and hand out given to pt. Pt verbalized understanding plan for medically manage at this point as per EP.will continue to monitor pt. Closely.

## 2017-10-17 NOTE — PROGRESS NOTES
WOC follow-up on BLE wounds:    D:  All previous scattered small open sloughy wounds on lower legs are now scabbed over with dry dark yellow-red scaly scabs, some starting to lift at edges, and legs are open to air.  Legs are dry and flaky in general but no real erythema or noticeable edema.  Feet in very good condition.      I:  Legs cleansed with Mayur lotion and Sween 24 cream applied.  Legs left open to air.  Reviewed supplies and POC with pt.     A/P:  Wounds appear slowly resolving, no s/s infection, would just keep legs clean and moisturized and open to air and allow scabs to lift off when ready.  If any areas do re-open, pt can go back to Iodosorb gel to those areas.  Pt may want to consider compression therapy in future if wounds reoccur or do not fully heal.      Plan for BLE:  Daily:  1.  Cleanse legs and feet with Mayur lotion and dry wipes, no need to rinse.   2.  Apply Sween 24 cream to legs and feet, avoiding between toes.   3.  Leave legs open to air, unless any areas re-open - then cleanse open wounds with MicroKlenz and apply Iodosorb gel, cover with gauze or foam, change daily.    4.  Elevate legs whenever possible.     WOC will return:  Weekly and prn  Face to face: 15 min

## 2017-10-17 NOTE — PLAN OF CARE
Problem: Patient Care Overview  Goal: Plan of Care/Patient Progress Review  Pt slept intermittently, uses Bipap as she wants.  Up to BSC x 1--voids minimally.  A/O.  PICC line intact.  Continuing with dialysis MWF.

## 2017-10-17 NOTE — PLAN OF CARE
Problem: Patient Care Overview  Goal: Plan of Care/Patient Progress Review  Discharge Planner PT   Patient plan for discharge:  TCU  Current status:  CGA and FWW with functional transfers, ambulated 45' x 2 and 30' x 1 with CGA and FWW.  Vitals following gait: BP 98/60, HR 78 bpm, O2 sats 100% on RA.    Barriers to return to prior living situation:  Decreased activity tolerance, weakness, needs assist with mobility, fall risk.   Recommendations for discharge:  TCU  Rationale for recommendations:  To progress strength, activity tolerance, and independence/safety with mobility.        Entered by: Simin Houser 10/17/2017 12:43 PM

## 2017-10-17 NOTE — PROGRESS NOTES
[facility]  Hospitalist Progress Note  Date of service (when I saw the patient)10/17/2017:         Assessment and Plan:   Ms Kathryn Banks  is a 77-year-old female with a history of diastolic congestive heart failure, EF 60% to 65%, type 2 non-insulin dependent diabetes mellitus, hemoglobin A1c of 7.6, paroxysmal atrial fibrillation, coronary artery disease, status post 5-vessel coronary artery bypass graft in the remote past who was admitted on 10/2/2017 from an outside hospital for evaluation and management of sepsis likely secondary to pneumonia. Upon arrival she was requiring vasopressor support, which has been weaned off. Intermittent hemodialysis was initiated on 10/2/2017 out of concern for acute on chronic renal failure, hyperkalemia, and metabolic acidosis. Her oxygenation has improved and she is currently on HF nasal cannula. Chest CT on 10/10/2017 noted continued bilateral pleural effusions, left greater than right.     Transfered out of ICU on 10/12.      Acute hypoxic respiratory failure:  - Secondary to suspected pneumonia and volume overload issues related to decompensated diastolic congestive heart failure, Rapid A. fib. Requiring hemodialysis and had thoracentesis for 2 liters. Completed a course of meropenem and vancomycin for possible sepsis with procalcitonin coming down (1.70 on 10/12).   - on room air  - Continue BiPAP at night for ANABEL.       - continue with hemodialysis per Nephrology.        Sepsis secondary to pneumonia:   - although her urinalysis was positive on 10/01 but this appears to have been adequately treated.   - Abx course completed as above. Also required a course of vancomycin for C. diff, but last stool on 10/02 was negative.    - BC 10/14 NTD  - remains on enteric isolation as she is still having some diarrhea.         Paroxysmal Atrial fibrillation with rapid ventricular rate:   - s/p ablation & cardioversion in 6/2017  - rate better   - cardiology following  started po amiodarone 10/14, repeat limited TTE 10/16 for evaluation of tachycardiomyopathy  - Given PRBC x 1 10/14 for hgb 8.7, cards request to have Hgb>10 for procedure 10/15  - Hgb on 10/1510.4, repeat 9.6 on 10/16  - BP improved 10/14 with 500ml fluid bolus & PRBC  on 10/14   - Bld cx 10/14 NTD  - EP consult appreciated, recommended medical mgmt with increased dose of Amiodarone. No plan for AVN ablation to avoid risk of infection in the face of recent sepsis/hemodialysis.  - cont chronically anticoagulated on Coumadin with Rph dosing, INR subtherapeutic 1.51 today.    - tolerating Lopressor 25mg po BID      Acute encephalopathy secondary to ICU delirium, sepsis: Improved.  - Conservative management of pain meds and benzodiazepines  - minimize use of benzodiazepines  - consider antipsychotics if acutely agitated (seroquel, zyprexa)        History of diabetes mellitus type 2:   - not on insulin at home, recent hemoglobin A1c was 7.3 in June.  Recheck is 6.6 on 10/13. She was transitioned off an insulin drip.   - on Lantus, increased to 8 units every morning 10/14  - Medium SSI   - continue mod cho      DVT Prophylaxis: Warfarin and Pneumatic Compression Devices  Code Status: Full Code      Disposition: waiting TCU placement     Guadalupe Noriega MD.  Hospitalist [phone] (7am -6 pm)                  Interval History:   C/o sig itching in perianal, vaginal area.              Medications:       warfarin  4 mg Oral ONCE at 18:00     amiodarone  400 mg Oral BID     metoprolol  25 mg Oral BID     insulin glargine  8 Units Subcutaneous QAM AC     pramipexole  0.125 mg Oral TID     insulin aspart  1-7 Units Subcutaneous TID AC     insulin aspart  1-5 Units Subcutaneous At Bedtime     pantoprazole  40 mg Oral QAM     ipratropium - albuterol 0.5 mg/2.5 mg/3 mL  1 vial Nebulization TID     pentoxifylline  400 mg Oral Daily     sodium chloride (PF)  10 mL Intracatheter Q8H     cholecalciferol  5,000 Units Oral Daily      - MEDICATION INSTRUCTIONS for Dialysis Patients -   Does not apply See Admin Instructions     miconazole, potassium chloride, potassium chloride, magnesium sulfate, potassium phosphate (KPHOS) in D5W IV, potassium phosphate (KPHOS) in D5W IV, potassium phosphate (KPHOS) in D5W IV, potassium phosphate (KPHOS) in D5W IV, Warfarin Therapy Reminder, clonazePAM, metoprolol, hydrALAZINE, lidocaine 4%, sodium chloride (PF), HYDROmorphone, acetaminophen, naloxone, albuterol, IV fluid REPLACEMENT ONLY, glucose **OR** dextrose **OR** glucagon, lidocaine               Physical Exam:   Blood pressure 111/61, pulse 73, temperature 98.5  F (36.9  C), temperature source Oral, resp. rate 16, weight 62.9 kg (138 lb 9.6 oz), SpO2 95 %, not currently breastfeeding.  Wt Readings from Last 4 Encounters:   10/17/17 62.9 kg (138 lb 9.6 oz)   10/01/17 77.3 kg (170 lb 8 oz)   17 77.1 kg (170 lb)   17 76.2 kg (168 lb)         Vital Sign Ranges  Temperature Temp  Av.9  F (37.2  C)  Min: 98.5  F (36.9  C)  Max: 99.3  F (37.4  C)   Blood pressure Systolic (24hrs), Av , Min:95 , Max:118        Diastolic (24hrs), Av, Min:56, Max:69      Pulse Pulse  Av  Min: 73  Max: 73   Respirations Resp  Av  Min: 16  Max: 16   Pulse oximetry SpO2  Av %  Min: 92 %  Max: 96 %         Intake/Output Summary (Last 24 hours) at 10/17/17 1230  Last data filed at 10/17/17 1030   Gross per 24 hour   Intake              930 ml   Output              700 ml   Net              230 ml       Constitutional: Awake, alert, cooperative, no apparent distress   Lungs: Clear to auscultation bilaterally, no crackles or wheezing   Cardiovascular: Regular rate and rhythm, normal S1 and S2, and no murmur noted   Abdomen: Normal bowel sounds, soft, non-distended, non-tender   Skin: No rashes, no cyanosis, no edema   Neuro:                Data:   All laboratory data reviewed

## 2017-10-17 NOTE — PLAN OF CARE
Problem: Patient Care Overview  Goal: Plan of Care/Patient Progress Review  Discharge Planner OT   Patient plan for discharge: TCU  Current status: Pt completed supine to sit EOB SBA, CGA sit to stand, amb with FWW CGA to/from bathroom, cues for safety with turning and completing toilet transfer, pt stood at sink for ADLS CGA  Pt fatigue after activity  Barriers to return to prior living situation: Decreased activity tolerance, weakness, needs assist with mobility, fall risk  Recommendations for discharge: TCU per plan established by the Occupational Therapist  Rationale for recommendations: pt not at baseline       Entered by: Vane Sol 10/17/2017 12:54 PM

## 2017-10-17 NOTE — PROGRESS NOTES
Met w/ pt re: transportation to TCU and to and from dialysis. Discussed transportation options with pt. Pt understands that transportation would be private pay. Pt initially did not want to go to [facility] r/t the amenity fee. She then stated [facility] may still be an option since it would be cheaper to pay the fee and have her  bring her to dialysis vs paying for a taxi or medicab from [facility] in [place] to dialysis in [facility].  Also discussed the use of CPAP while here in the hospital. The pt states she has a CPAP at home. Requested that she have her  bring her CPAP to TCU when discharged.

## 2017-10-17 NOTE — PLAN OF CARE
Problem: Patient Care Overview  Goal: Plan of Care/Patient Progress Review  Outcome: No Change  AOx4, VSS, Tele NSr with a HR of 74, up with SBA and walker to BSC. No c/o CP or pain. Pt is itchy, miconazole powder advised, no PO antifungals d/t contraindication with aminodarone. Waiting for bed to be available before D/C to TCU. CTM.

## 2017-10-17 NOTE — CONSULTS
"Consult received for \"Renal diet education\"    Pt is currently on a \"Dialysis, Moderate CHO\" diet    Note plans for dc to TCU and pt to continue with OP dialysis    Visited with pt this morning  Provided and reviewed written handouts on the Dialysis Diet guidelines  Pt with many questions  Asked me to review her magazine with recipes - found some soups she could make  Explained that she would also be followed by a dietitian at her dialysis center  Pt may also speak with dietitian at the TCU prior to going home (if she has more questions)    Megan Rees, TAD, LD  Clinical Dietitian - [facility]  Pager - [phone]      "

## 2017-10-18 VITALS
SYSTOLIC BLOOD PRESSURE: 112 MMHG | RESPIRATION RATE: 16 BRPM | BODY MASS INDEX: 23.54 KG/M2 | OXYGEN SATURATION: 95 % | HEART RATE: 70 BPM | WEIGHT: 137.13 LBS | TEMPERATURE: 99.1 F | DIASTOLIC BLOOD PRESSURE: 66 MMHG

## 2017-10-18 LAB
GLUCOSE BLDC GLUCOMTR-MCNC: 87 MG/DL (ref 70–99)
GLUCOSE BLDC GLUCOMTR-MCNC: 97 MG/DL (ref 70–99)
INR PPP: 1.73 (ref 0.86–1.14)

## 2017-10-18 PROCEDURE — 40000239 ZZH STATISTIC VAT ROUNDS

## 2017-10-18 PROCEDURE — 25000132 ZZH RX MED GY IP 250 OP 250 PS 637: Mod: GY | Performed by: INTERNAL MEDICINE

## 2017-10-18 PROCEDURE — 99239 HOSP IP/OBS DSCHRG MGMT >30: CPT | Performed by: INTERNAL MEDICINE

## 2017-10-18 PROCEDURE — 25000131 ZZH RX MED GY IP 250 OP 636 PS 637: Mod: GY | Performed by: INTERNAL MEDICINE

## 2017-10-18 PROCEDURE — 25000132 ZZH RX MED GY IP 250 OP 250 PS 637: Mod: GY | Performed by: NURSE PRACTITIONER

## 2017-10-18 PROCEDURE — 25000132 ZZH RX MED GY IP 250 OP 250 PS 637: Mod: GY | Performed by: SURGERY

## 2017-10-18 PROCEDURE — A9270 NON-COVERED ITEM OR SERVICE: HCPCS | Mod: GY | Performed by: NURSE PRACTITIONER

## 2017-10-18 PROCEDURE — A9270 NON-COVERED ITEM OR SERVICE: HCPCS | Mod: GY | Performed by: INTERNAL MEDICINE

## 2017-10-18 PROCEDURE — 90937 HEMODIALYSIS REPEATED EVAL: CPT

## 2017-10-18 PROCEDURE — 40000275 ZZH STATISTIC RCP TIME EA 10 MIN

## 2017-10-18 PROCEDURE — 63400005 ZZH RX 634: Performed by: INTERNAL MEDICINE

## 2017-10-18 PROCEDURE — 85610 PROTHROMBIN TIME: CPT | Performed by: INTERNAL MEDICINE

## 2017-10-18 PROCEDURE — 94640 AIRWAY INHALATION TREATMENT: CPT

## 2017-10-18 PROCEDURE — 99211 OFF/OP EST MAY X REQ PHY/QHP: CPT

## 2017-10-18 PROCEDURE — 00000146 ZZHCL STATISTIC GLUCOSE BY METER IP

## 2017-10-18 PROCEDURE — 25000125 ZZHC RX 250: Performed by: SURGERY

## 2017-10-18 PROCEDURE — 25000128 H RX IP 250 OP 636: Performed by: INTERNAL MEDICINE

## 2017-10-18 PROCEDURE — A9270 NON-COVERED ITEM OR SERVICE: HCPCS | Mod: GY | Performed by: SURGERY

## 2017-10-18 RX ORDER — AMIODARONE HYDROCHLORIDE 400 MG/1
400 TABLET ORAL 2 TIMES DAILY
Qty: 12 TABLET | Refills: 0 | DISCHARGE
Start: 2017-10-18 | End: 2017-10-20

## 2017-10-18 RX ORDER — DOXERCALCIFEROL 4 UG/2ML
2 INJECTION INTRAVENOUS SEE ADMIN INSTRUCTIONS
Status: DISCONTINUED | OUTPATIENT
Start: 2017-10-18 | End: 2017-10-18 | Stop reason: HOSPADM

## 2017-10-18 RX ORDER — AMIODARONE HYDROCHLORIDE 200 MG/1
200 TABLET ORAL DAILY
Qty: 30 TABLET | Refills: 1 | DISCHARGE
Start: 2017-10-24 | End: 2017-10-20

## 2017-10-18 RX ORDER — WARFARIN SODIUM 1 MG/1
4 TABLET ORAL DAILY
Qty: 8 TABLET | Refills: 0 | DISCHARGE
Start: 2017-10-18 | End: 2017-10-22

## 2017-10-18 RX ORDER — WARFARIN SODIUM 3 MG/1
3 TABLET ORAL
Status: DISCONTINUED | OUTPATIENT
Start: 2017-10-18 | End: 2017-10-18 | Stop reason: HOSPADM

## 2017-10-18 RX ORDER — CLONAZEPAM 0.5 MG/1
0.25 TABLET ORAL 2 TIMES DAILY PRN
Qty: 14 TABLET | Refills: 0 | Status: ON HOLD | DISCHARGE
Start: 2017-10-18 | End: 2017-10-31

## 2017-10-18 RX ORDER — DOXERCALCIFEROL 4 UG/2ML
2 INJECTION INTRAVENOUS SEE ADMIN INSTRUCTIONS
Qty: 25.5 ML | Refills: 1 | DISCHARGE
Start: 2017-10-18 | End: 2017-10-20

## 2017-10-18 RX ADMIN — AMIODARONE HYDROCHLORIDE 400 MG: 200 TABLET ORAL at 12:23

## 2017-10-18 RX ADMIN — EPOETIN ALFA 4000 UNITS: 4000 SOLUTION INTRAVENOUS; SUBCUTANEOUS at 10:57

## 2017-10-18 RX ADMIN — Medication 0.25 MG: at 08:29

## 2017-10-18 RX ADMIN — SODIUM CHLORIDE 250 ML: 9 INJECTION, SOLUTION INTRAVENOUS at 09:41

## 2017-10-18 RX ADMIN — CHOLECALCIFEROL CAP 125 MCG (5000 UNIT) 5000 UNITS: 125 CAP at 08:29

## 2017-10-18 RX ADMIN — PRAMIPEXOLE DIHYDROCHLORIDE 0.12 MG: 0.12 TABLET ORAL at 12:23

## 2017-10-18 RX ADMIN — PENTOXIFYLLINE 400 MG: 400 TABLET, FILM COATED, EXTENDED RELEASE ORAL at 12:23

## 2017-10-18 RX ADMIN — DOXERCALCIFEROL 2 MCG: 2 INJECTION, SOLUTION INTRAVENOUS at 09:38

## 2017-10-18 RX ADMIN — INSULIN GLARGINE 8 UNITS: 100 INJECTION, SOLUTION SUBCUTANEOUS at 08:29

## 2017-10-18 RX ADMIN — METOPROLOL TARTRATE 25 MG: 25 TABLET ORAL at 12:23

## 2017-10-18 RX ADMIN — PANTOPRAZOLE SODIUM 40 MG: 40 TABLET, DELAYED RELEASE ORAL at 08:29

## 2017-10-18 RX ADMIN — IPRATROPIUM BROMIDE AND ALBUTEROL SULFATE 3 ML: .5; 3 SOLUTION RESPIRATORY (INHALATION) at 08:07

## 2017-10-18 NOTE — PLAN OF CARE
Problem: Patient Care Overview  Goal: Plan of Care/Patient Progress Review  Outcome: No Change  A&OX4. Denies chest pain, SOB on exertion. Vitals stable. O2 sat 97-98% RA. TELE SR. Up with SBA with walker. PRN clonazepam given for anxiety. Enteric precaution continue.Plan to d/c to TCU once bed available. Will continue to monitor.

## 2017-10-18 NOTE — DISCHARGE SUMMARY
[facility]    Discharge Summary  Hospitalist    Date of Admission:  10/2/2017  Date of Discharge:  10/18/2017  Discharging Provider: Hank Babcock  Date of Service (when I saw the patient): 10/18/17    Discharge Diagnoses     Acute hypoxic respiratory failure  Septic shock secondary to LLL pneumonia  Bilateral pleural effusions  Diastolic heart failure  Acute on chronic kidney disease  Paroxysmal Atrial fibrillation with rapid ventricular rate  Acute encephalopathy secondary to ICU delirium, sepsis  History of diabetes mellitus type 2      History of Present Illness   Ms Kathryn Banks  is a 77-year-old female with a history of diastolic congestive heart failure, EF 60% to 65%, type 2 non-insulin dependent diabetes mellitus, hemoglobin A1c of 7.6, paroxysmal atrial fibrillation, coronary artery disease, status post 5-vessel coronary artery bypass graft in the remote past who was admitted on 10/2/2017 from an outside hospital for evaluation and management of septic shock likely secondary to pneumonia.     Hospital Course   Kathryn Banks was admitted on 10/2/2017.  The following problems were addressed during her hospitalization:        Acute hypoxic respiratory failure  B/l Pleural effusions  Secondary to left sided pneumonia, volume overload issues related to decompensated diastolic congestive heart failure, Rapid A. Fib, and pleural effusions requiring hemodialysis and thoracentesis for 2 liters.  Saturating well on RA at time of discharge.         Septic shock secondary to LLL pneumonia   Upon arrival she was requiring vasopressor support and HF NC and treated in the ICU.  Intermittent hemodialysis was initiated on 10/2/2017 out of concern for acute on chronic renal failure, hyperkalemia, and metabolic acidosis.  Treated with a coarse of meropenem and vancomycin with resolution of her sepsis and PNA. Blood cultures remained negative.         Paroxysmal Atrial fibrillation with rapid  ventricular rate   S/p ablation & cardioversion in 6/2017.  EP Cardiology was consulted and recommended medical management and  started po amiodarone 10/14. No plan for AVN ablation to avoid risk of infection in the face of recent sepsis/hemodialysis. Continued on Metoprolol 25 mg bid as well as Warfarin. Outpt Cardiology follow up recommended.        Acute encephalopathy secondary to ICU delirium, sepsis  Conservative management of pain meds and benzodiazepines used.    H/o C-Diff Colitis    Had already completed a course of vancomycin for C. Diff PTA. Stool cx on 10/02 was negative.      History of diabetes mellitus type 2:   Not on insulin at home, recent hemoglobin A1c was 7.3 in June.  Recheck is 6.6 on 10/13.  Treated with aninsulin drip in the ICU and ultimately transitioned to Lantus 8 units every morning.  This was discontinued on discharge with close pcp follow up recommended.         Hank Babcock      Significant Results and Procedures   See below    Pending Results   These results will be followed up by RADHA TAVAREZ, PCP, Cardiology.   Unresulted Labs Ordered in the Past 30 Days of this Admission     Date and Time Order Name Status Description    10/14/2017 1502 Blood culture Preliminary     10/14/2017 1432 Blood culture Preliminary     8/12/2017 1800 ILA Preliminary     8/12/2017 1800 ILA Preliminary           Code Status   Full Code       Primary Care Physician   Dheeraj Jj    Physical Exam   Temp: 98.1  F (36.7  C) Temp src: Oral BP: 128/49 Pulse: 70 Heart Rate: 74 Resp: 16 SpO2: 95 % O2 Device: None (Room air)    Vitals:    10/16/17 0815 10/17/17 0500 10/18/17 0432   Weight: 63.5 kg (139 lb 15.9 oz) 62.9 kg (138 lb 9.6 oz) 62.2 kg (137 lb 2 oz)     Vital Signs with Ranges  Temp:  [97.7  F (36.5  C)-98.7  F (37.1  C)] 98.1  F (36.7  C)  Pulse:  [70-79] 70  Heart Rate:  [73-74] 74  Resp:  [16-18] 16  BP: (101-136)/(49-72) 128/49  SpO2:  [94 %-98 %] 95 %  I/O last 3 completed shifts:  In: 520  [P.O.:520]  Out: 900 [Urine:500; Other:400]    Gen: Patient in no acute distress.  Appears comfortable.  Heart:  S1S2+, regular rate and rhythm, No murmurs.  Lungs:  Clear to auscultation, no wheezing, no rales.   Abdomen:  Soft, non tender, non distended, bowel sounds positive.  Extremities:  No edema.    Discharge Disposition   Discharged to nursing home  Condition at discharge: Stable    Consultations This Hospital Stay   PHARMACY TO DOSE VANCO  PHARMACY IP CONSULT  WOUND OSTOMY CONTINENCE NURSE  IP CONSULT  NEPHROLOGY IP CONSULT  WOUND OSTOMY CONTINENCE NURSE  IP CONSULT  VASCULAR ACCESS ADULT IP CONSULT  CARDIOLOGY IP CONSULT  PHARMACY TO DOSE WARFARIN  PHYSICAL THERAPY ADULT IP CONSULT  OCCUPATIONAL THERAPY ADULT IP CONSULT  SOCIAL WORK IP CONSULT  CARE COORDINATOR IP CONSULT  NUTRITION SERVICES ADULT IP CONSULT  PHYSICAL THERAPY ADULT IP CONSULT  OCCUPATIONAL THERAPY ADULT IP CONSULT    Time Spent on this Encounter   I, Hank Babcock, personally saw the patient today and spent greater than 30 minutes discharging this patient.    Discharge Orders     General info for SNF   Length of Stay Estimate: Short Term Care: Estimated # of Days <30  Condition at Discharge: Improving  Level of care:skilled   Rehabilitation Potential: Good  Admission H&P remains valid and up-to-date: Yes  Recent Chemotherapy: N/A  Use Nursing Home Standing Orders: Yes     Mantoux instructions   Give two-step Mantoux (PPD) Per Facility Policy Yes     Glucose monitor nursing POCT   Before meals and at bedtime     Intake and output   Every shift     Daily weights   Call Provider for weight gain of more than 2 pounds per day or 5 pounds per week.     IV access   Hemodialysis.  RACW Dialysis catheter     Activity - Up with nursing assistance     Full Code     Physical Therapy Adult Consult   Evaluate and treat as clinically indicated.    Reason:  Deconditioning     Occupational Therapy Adult Consult   Evaluate and treat as clinically  indicated.    Reason:  Deconditioning     Contact Isolation   Enteric     Advance Diet as Tolerated   Follow this diet upon discharge:     Combination Diet Dialysis Diet; 8600-2052 Calories: Moderate Consistent CHO (4-6 CHO units/meal)       Discharge Medications   Current Discharge Medication List      START taking these medications    Details   doxercalciferol (HECTOROL) 4 MCG/2ML SOLN injection Inject 1 mL (2 mcg) into the vein See Admin Instructions  Qty: 25.5 mL, Refills: 1    Associated Diagnoses: CKD (chronic kidney disease) stage 5, GFR less than 15 ml/min (H)      cholecalciferol 5000 UNITS CAPS Take 1 capsule (5,000 Units) by mouth daily  Refills: 3    Associated Diagnoses: CKD (chronic kidney disease) stage 5, GFR less than 15 ml/min (H)      clonazePAM (KLONOPIN) 0.5 MG tablet Take 0.5 tablets (0.25 mg) by mouth 2 times daily as needed for anxiety  Qty: 14 tablet, Refills: 0    Associated Diagnoses: Anxiety      !! amiodarone 400 MG TABS Take 400 mg by mouth 2 times daily for 6 days  Qty: 12 tablet, Refills: 0    Associated Diagnoses: Atrial fibrillation with rapid ventricular response (H)      !! amiodarone (PACERONE/CODARONE) 200 MG tablet Take 1 tablet (200 mg) by mouth daily  Qty: 30 tablet, Refills: 1    Associated Diagnoses: Atrial fibrillation with rapid ventricular response (H)       !! - Potential duplicate medications found. Please discuss with provider.      CONTINUE these medications which have CHANGED    Details   warfarin (COUMADIN) 1 MG tablet Take 4 tablets (4 mg) by mouth daily for 2 days  Qty: 8 tablet, Refills: 0    Comments: Will need to re-evaluate the dose based on INR values daily and dose as accordingly.  Associated Diagnoses: Atrial fibrillation with rapid ventricular response (H)         CONTINUE these medications which have NOT CHANGED    Details   darbepoetin hira (ARANESP, ALBUMIN FREE,) 60 MCG/0.3ML injection Inject 0.3 mLs (60 mcg) Subcutaneous every 14 days As needed for  hgb<10g/dL.  If Hgb increases >1 point in 2 weeks (if blood transfusion given, use hgb PRIOR to this), SYSTOLIC BP > 180 mmHg or hgb>=10g/dL, HOLD DOSE. Dose must be within 1 week of Hgb.  Per anemia protocol with Vibha Barfield MD/Yesy Samaniego,PharmD [phone]  Qty: 0.3 mL, Refills: 99    Associated Diagnoses: Anemia in stage 5 chronic kidney disease (H); CKD (chronic kidney disease) stage 5, GFR less than 15 ml/min (H)      pramipexole (MIRAPEX) 0.125 MG tablet Take 1 tablet (0.125 mg) by mouth 3 times daily  Qty: 90 tablet, Refills: 3    Associated Diagnoses: Restless leg syndrome      Calcium Carb-Cholecalciferol 500-400 MG-UNIT TABS Take 1 tablet by mouth 2 times daily      ACETAMINOPHEN PO Take 650 mg by mouth every 4 hours as needed for pain For pain 1-5 out of 10      cyanocobalamin (VITAMIN B12) 1000 MCG/ML injection Inject 1 mL into the muscle every 30 days      nitroGLYcerin (NITROSTAT) 0.4 MG sublingual tablet Place 1 tablet (0.4 mg) under the tongue every 5 minutes as needed for chest pain  Qty: 25 tablet, Refills: 0    Associated Diagnoses: Hx of non-ST elevation myocardial infarction (NSTEMI); Atherosclerosis of native coronary artery of native heart without angina pectoris; Postsurgical aortocoronary bypass status      pravastatin (PRAVACHOL) 40 MG tablet Take 1 tablet (40 mg) by mouth daily  Qty: 90 tablet, Refills: 3    Associated Diagnoses: Hx of non-ST elevation myocardial infarction (NSTEMI); Atherosclerosis of native coronary artery of native heart without angina pectoris; Type 2 diabetes mellitus with other circulatory complications      metoprolol (LOPRESSOR) 50 MG tablet Take 0.5 tablets (25 mg) by mouth 2 times daily  Qty: 180 tablet, Refills: 3    Associated Diagnoses: Atrial fibrillation with rapid ventricular response (H)      calcium acetate (PHOSLO) 667 MG CAPS capsule Take 1 capsule (667 mg) by mouth 3 times daily (with meals)  Qty: 180 capsule    Associated Diagnoses: Chronic  kidney disease, unspecified CKD stage      pentoxifylline (TRENTAL) 400 MG CR tablet Take 1 tablet (400 mg) by mouth daily    Associated Diagnoses: Venous stasis ulcer (H)      fluticasone (FLONASE) 50 MCG/ACT spray Spray 1 spray into both nostrils daily  Qty: 1 Bottle, Refills: 0    Associated Diagnoses: Nasal congestion      ipratropium - albuterol 0.5 mg/2.5 mg/3 mL (DUONEB) 0.5-2.5 (3) MG/3ML neb solution Take 1 vial by nebulization 3 times daily      Lactobacillus (ACIDOPHILUS) tablet Take 1 tablet by mouth daily    Associated Diagnoses: Urinary tract infection without hematuria, site unspecified; Cellulitis of lower extremity, unspecified laterality      ORDER FOR DME Equipment being ordered: cpap machine, mask, humidifier, tubing and filters  Qty: 1 Device, Refills: 0    Associated Diagnoses: ANABEL (obstructive sleep apnea)         STOP taking these medications       levofloxacin (LEVAQUIN) 500 MG tablet Comments:   Reason for Stopping:         sodium bicarbonate 650 MG tablet Comments:   Reason for Stopping:         vancomycin (VANOCIN) 50 mg/mL LIQD solution Comments:   Reason for Stopping:         ferrous sulfate (IRON) 325 (65 FE) MG tablet Comments:   Reason for Stopping:         torsemide (DEMADEX) 20 MG tablet Comments:   Reason for Stopping:         guaiFENesin-dextromethorphan (ROBITUSSIN DM) 100-10 MG/5ML syrup Comments:   Reason for Stopping:         calcitRIOL (ROCALTROL) 0.5 MCG capsule Comments:   Reason for Stopping:         aspirin 81 MG tablet Comments:   Reason for Stopping:         gabapentin (NEURONTIN) 300 MG capsule Comments:   Reason for Stopping:         amLODIPine (NORVASC) 5 MG tablet Comments:   Reason for Stopping:             Allergies   Allergies   Allergen Reactions     Ciprofloxacin Nausea and Vomiting     Zofran did not help     Oxycodone Visual Disturbance     Delusions, blackouts      Lisinopril Cough     Sulfa Drugs GI Disturbance     LOSS OF TASTE     Data   Most Recent 3  CBC's:  Recent Labs   Lab Test  10/16/17   1020  10/15/17   0748  10/14/17   0539  10/12/17   0456  10/11/17   0420   WBC   --    --   7.4  9.4  8.4   HGB  9.6*  10.4*  8.7*  8.1*  8.6*   MCV   --    --   92  91  90   PLT   --    --   205  161  152      Most Recent 3 BMP's:  Recent Labs   Lab Test  10/16/17   0545  10/15/17   0700  10/14/17   0539   NA  143  144  137   POTASSIUM  4.3  3.9  3.8   CHLORIDE  111*  111*  104   CO2  22  25  27   BUN  26  18  12   CR  4.51*  3.69*  2.65*   ANIONGAP  10  8  6   MALICK  7.4*  7.7*  8.0*   GLC  102*  94  263*     Most Recent 2 LFT's:  Recent Labs   Lab Test  10/12/17   0456  10/11/17   0420   AST  9  12   ALT  8  9   ALKPHOS  128  145   BILITOTAL  0.4  0.3     Most Recent INR's and Anticoagulation Dosing History:  Anticoagulation Dose History     Recent Dosing and Labs Latest Ref Rng & Units 10/12/2017 10/13/2017 10/14/2017 10/15/2017 10/16/2017 10/17/2017 10/18/2017    Warfarin 2 mg - 2 mg 2 mg - - - - -    Warfarin 3 mg - - - - - 3 mg - -    Warfarin 4 mg - - - 4 mg 4 mg - 4 mg -    INR 0.86 - 1.14 1.21(H) 1.19(H) 1.27(H) 1.29(H) 1.54(H) 1.51(H) 1.73(H)    INR 0.86 - 1.14 - - - - - - -    INR Point of Care 0.86 - 1.14 - - - - - - -        Most Recent 3 Troponin's:  Recent Labs   Lab Test  08/26/17   0637  07/11/17   1604  07/11/17   0940   02/22/14   0024   TROPI  <0.015  0.022  0.022   < >   --    TROPONIN   --    --    --    --   0.01    < > = values in this interval not displayed.     Most Recent Cholesterol Panel:  Recent Labs   Lab Test  04/12/16   1132   CHOL  127   LDL  47   HDL  65   TRIG  73     Most Recent 6 Bacteria Isolates From Any Culture (See EPIC Reports for Culture Details):  Recent Labs   Lab Test  10/14/17   1540  10/14/17   1500  10/12/17   2045  10/11/17   1200  10/02/17   1430  10/01/17   2255   CULT  No growth after 4 days  No growth after 4 days  Moderate growth  Normal tyler    No growth  Light growth  Candida albicans / dubliniensis  Brook albicans  and Brook dubliniensis are not routinely speciated  Susceptibility testing not routinely done  *  Light growth  Coagulase negative Staphylococcus  Susceptibility testing not routinely done  *  No growth     Most Recent TSH, T4 and A1c Labs:  Recent Labs   Lab Test  10/13/17   0644  10/11/17   0420   TSH   --   5.94*   T4   --   1.59*   A1C  6.6*   --        Results for orders placed or performed during the hospital encounter of 10/02/17   XR Chest Port 1 View    Narrative    PORTABLE CHEST ONE VIEW 10/2/2017 2:59 PM     COMPARISON: Frontal chest x-ray 10/2/2017 at 0033 hours.    HISTORY: Increased work of breathing.      Impression    IMPRESSION: Median sternotomy changes and a right intrajugular central  venous catheter are again noted.    Moderate-sized left pleural effusion again noted. There is no pleural  effusion on the right. There is no pneumothorax on either side. Left  upper lung and entire right lung are clear. Left base atelectasis or  pneumonia cannot be excluded given the left base opacity. Heart size  appears within normal limits given portable technique.    BEL ZHU MD   XR Chest Port 1 View    Narrative    CHEST PORTABLE ONE VIEW October 3, 2017 6:35 AM     HISTORY: Hypoxic respiratory failure.    COMPARISON: 10/2/2017.      Impression    IMPRESSION: Worsening pulmonary edema pattern with increased perihilar  and lower lobe airspace opacities. There is also slightly asymmetric  opacity in the right upper lung which may be due to asymmetric edema  although pneumonia is not excluded. Persistent left basilar  consolidation with left pleural effusion. No pneumothorax visualized.  Right IJ catheter remains in place. Cardiac silhouette remains  enlarged.    GRADY VICTOR MD   XR Chest Port 1 View    Narrative    XR CHEST PORT 1 VW 10/4/2017 10:35 AM    COMPARISON: 10/3/2017    HISTORY: Increasing oxygen requirements.      Impression    IMPRESSION: Median sternotomy wires appear intact. Right  internal  jugular central venous catheter tip remains in the mid SVC. Small to  moderate left pleural effusion is slightly increased since yesterday's  study. Patchy interstitial and airspace opacities most pronounced in  the right apex are again seen, slightly worsened. No pneumothorax on  either side.    ANUP DURAND MD   IR CVC Tunnel Placement > 5 Yrs of Age    Narrative    TUNNELED CATHETER PLACEMENT 10/6/2017 11:43 AM    INDICATION:  Chronic intravenous access.  77-year-old woman with renal  failure, request made for hemodialysis access.    Location:  Right internal jugular vein.    PROCEDURE:   Ultrasound guidance:  Ultrasound was used to localize and document  patency of the internal jugular vein.  A permanent image of the vein  was recorded.      Maximal Sterile Barrier Technique Utilized: Cap AND mask AND sterile  gown AND sterile gloves AND sterile full body drape AND hand hygiene  AND skin preparation 2% chlorhexidine for cutaneous antisepsis (or  acceptable alternative antiseptics).  Sterile Ultrasound Technique  Utilized ?Sterile gel AND sterile probe covers.    Local anesthesia was administered to the skin over the vein and a 4 mm  incision was made.  Ultrasound was used to guide placement of a 5F  dilator in the vein using standard technique.      Lidocaine was then administered in the subcutaneous tissue over the  clavicle to a point about 5 cm below the mid clavicle.  A short  incision was made at this point.  A 19 cm catheter was then brought  through the tract between the two incisions and inserted into the  jugular vein through a peel away sheath. The catheter was secured in  the subclavian region with two interrupted stitches of 2-0  polypropylene.  The neck incision was closed with Dermabond adhesive.       Complications:  None. However, after initial administration of  sedation, patient became hypoxic with oxygen administered via facemask  at 15 L. Patient desaturated to the 60s. Narcan was  given with minimal  improvement. Therefore, BiPAP was reapplied as this had been removed  only 2 hours previously. Vital signs promptly returned to normal with  oxygenation in the 90s and remained throughout remainder of the exam.    Sedation: 1 mg midazolam.                  50   mcg fentanyl.   0.4 mg IV Narcan.  Sedation time: 30 minutes.    Vital signs and sedation monitored by our nursing staff under my  supervision.     Fluoroscopy time:  0.5 minutes.  Total fluoroscopic dose: 3 mGy.  Contrast given:  None.    Local anesthetic:  16 mL of 1% lidocaine.    FINDINGS: Two spot fluoroscopic images confirm appropriate placement  of right IJ tunneled hemodialysis catheter with tip at the right  atrial/SVC junction. Right upper extremity PICC is also partially  visible. Scattered opacities throughout the right lung relatively  unchanged compared to chest x-ray on October 5, 2017.      Impression    IMPRESSION: Placement of right internal jugular tunneled hemodialysis  catheter. The catheter is ready for immediate use.    Upon transferring patient to the fluoroscopy table, with face mask and  100% oxygen, patient desaturating to the 60s. This required  respiratory therapy to replace BiPAP.  Once saturations were  consistently returned to normal with oxygenation in the 90s, the  procedure proceeded without difficulty.    ELLIOTT MARTINEZ MD   XR Chest Port 1 View    Narrative    XR CHEST PORT 1 VW 10/5/2017 12:47 PM    HISTORY: Increased work of breathing.    COMPARISON: October 4, 2017.      Impression    IMPRESSION: Right jugular venous catheter in position as before. There  are increased patchy opacities throughout bilateral lungs,  particularly of the right upper lobe and left lung base, suggestive of  worsening infiltrates. Left pleural effusion as before. No  pneumothorax.    PRATIK CORNEJO MD   XR Chest Port 1 View    Narrative    XR CHEST PORT 1 VW 10/7/2017 12:17 PM    HISTORY: pneumonia      Impression     IMPRESSION: Tip of central line in distal SVC. Sternotomy. Diffuse  bilateral pulmonary infiltrates. These have improved in the right  upper lobe compared to 10/5/2017, yet there is increased infiltrate in  the left lung base compared to the previous exam.    LAYNE POWELL MD   XR Chest Port 1 View    Narrative    CHEST ONE VIEW PORTABLE   10/9/2017 6:10 AM     HISTORY:  Follow up infiltrates.    COMPARISON: 10/7/2017      Impression    IMPRESSION: Persistent bilateral alveolar pulmonary infiltrates,  slightly improved. Moderate left pleural effusion is slightly worse.  Normal heart size. Sternal wires. Catheter from right subclavian  approach with the tip in the upper right atrium. PICC line from the  right with the tip at the cavoatrial junction.    STEPHANIE DOMINGUEZ MD   CT Chest w/o Contrast    Narrative    CT CHEST WITHOUT CONTRAST 10/10/2017 3:51 PM     HISTORY: Evaluate infiltrate and left-sided effusion.    TECHNIQUE: Volumetric acquisition of the chest  without contrast.  Radiation dose for this scan was reduced using automated exposure  control, adjustment of the mA and/or kV according to patient size, or  iterative reconstruction technique.    COMPARISON: Chest x-ray 10/9/2017.    FINDINGS: Moderate bilateral pleural effusions, greater on the left.  Associated moderate left and mild right bibasilar airspace opacities  with consolidation which are likely due to atelectasis. Additional  patchy areas of mixed groundglass and interstitial infiltrate in both  lungs probably due to edema or less likely infection. Heart size  within normal limits. Prominent left atrium. Coronary artery  calcifications. Tiny left upper lung nodule (series 3, image 17)  measuring less than 0.4 cm. Right PICC and right-sided central venous  catheters with tips in the SVC. Large gallstones. Mild degenerative  and hypertrophic changes in the visualized spine.      Impression    IMPRESSION:  1. Moderate pleural effusions and dependent  atelectasis. Patchy  diffuse areas of groundglass and some interstitial opacity in both  lungs likely due to edema or possibly pneumonia.  2. Suspect findings most likely due to CHF. Correlate clinically.  3. Tiny left upper lung nodule, technically indeterminate but of  doubtful significance. See below.  4. Cholelithiasis.    Recommendations for an incidental lung nodule > 8mm:    Low risk patients: Consider follow-up CT in 3 months, PET/CT, and/or  tissue sampling.    High risk patients: Same as for low risk patients.    *Low Risk: Minimal or absent history of smoking or other known risk  factors.  *Nonsolid (ground-glass) or partly solid nodules may require longer  follow-up to exclude indolent adenocarcinoma.  *Recommendations based on Guidelines for the Management of Incidental  Pulmonary Nodules Detected at CT: From the Fleischner Society 2017,  Radiology 2017.    BRENT SEGURA MD   US Thoracentesis Portable    Narrative    ULTRASOUND GUIDED THORACENTESIS October 11, 2017 12:39 PM     HISTORY: Left pleural effusion.    FINDINGS: Ultrasound was used to evaluate for the presence and best  approach for drainage of a pleural effusion. Written and oral informed  consent was obtained. A pause for the cause procedure to verify the  correct patient and correct procedure. The skin overlying the left  chest posteriorly was prepped and draped in the usual sterile fashion.  The subcutaneous tissues were anesthetized with 5mL 1% lidocaine. A  catheter was advanced into the pleural space and 1250 mL of  leland  colored fluid was drained. Patient was monitored by nurse under my  direct supervision throughout the exam. Ultrasound images were  permanently stored. There were no immediate complications. Patient  left the ultrasound suite in satisfactory condition.      Impression    IMPRESSION: Technically successful thoracentesis without immediate  complications.    BARRY TAO MD   XR Chest Port 1 View    Narrative     CHEST PORTABLE ONE VIEW   10/12/2017 6:30 AM     HISTORY: Evaluate post tap effusion/infiltrates.    COMPARISON: 10/9/2017.      Impression    IMPRESSION: Interval left-sided thoracentesis has resulted in  significant decrease in left pleural effusion. There is also  significant improvement in pulmonary edema pattern since the prior  exam. Previous midline sternotomy and central venous catheters or  ports remain. Heart size is borderline prominent.    EDUARDO AGUILAR MD     *Note: Due to a large number of results and/or encounters for the requested time period, some results have not been displayed. A complete set of results can be found in Results Review.

## 2017-10-18 NOTE — PROGRESS NOTES
Renal Medicine         Orders called to Nirali Vega  Arrive 0600 10/20/17          EMILIA Galeas    [facility]  [phone]

## 2017-10-18 NOTE — PROGRESS NOTES
Patient has dialysis time at [facility] 10- needs to check in at 0600 am. Faxed CXR report requested to [phone].

## 2017-10-18 NOTE — PROGRESS NOTES
Patient now wants [facility] in [facility] ( [phone]) for her dialysis.  Spoke with Cas she will fax us a list of information we need . She is going to check authorization from her insurance. Will await for their coordinator to process. Fax number for them is ([phone]). Will await for call back. Spoke with Madeline critical Manager for [facility] at ( [phone]) she gave us a time at [facility] for Friday October 20 at 10:30 AM. Faxed all information requested. Dr Sahu updated he called orders to [facility]. Updated: has verification of chair time for Monday , Wed, Friday HD at [facility] to arrive at 1030 this Friday for 11:00 am chair time. Updated [facility] . Sent them the fax from [facility] with HD schedule and chair times

## 2017-10-18 NOTE — PROGRESS NOTES
SW:  D:  Received discharge orders for patient.  Bed available at [facility] for today.  Patient's  will transport around 16:15 today.  Patient informed of the plan and in agreement to the plan.  Updated [facility] and faxed the orders and the PAS.  Call placed to [facility] to cancel the bed.    PAS-RR    D: Per DHS regulation, SW completed and submitted PAS-RR to [place] [employer] Direct Connect via the Senior LinkAge Line.  PAS-RR confirmation # is : [id number].    I: SW spoke with patient and  and they are aware a PAS-RR has been submitted.  EVELIO reviewed with patient and  that they may be contacted for a follow up appointment within 10 days of hospital discharge if their SNF stay is < 30 days.  Contact information for Senior LinkAge Line was also provided.    A: Patient and  verbalized understanding.    P: Further questions may be directed to Senior LinkAge Line at #[phone], option #4 for PAS-RR staff.

## 2017-10-18 NOTE — PLAN OF CARE
Problem: Fluid Volume Excess (Adult)  Goal: Identify Related Risk Factors and Signs and Symptoms  Related risk factors and signs and symptoms are identified upon initiation of Human Response Clinical Practice Guideline (CPG).   Outcome: No Change  Patient slept fair during night.  Patient wanted to finish watching a movie she started before using CPAP.  Denies pain.  Up to BR to void with walker.  Patient unsure of plan today in regards to being accepted to a TCU.

## 2017-10-18 NOTE — PROGRESS NOTES
Renal Medicine Inpatient Dialysis Note                                Kathryn Banks MRN# [id number]   Age: 77 year old YOB: 1940   Date of Admission: 10/2/2017 Hospital LOS: 16          Assessment/Plan:       Admitted in setting of presumed sepsis.  Course complicated by respiratory failure,  Afib with RVR and encephalopathy    Dialysis initiated 10/02/17    1.  ESRD   -tunneled line   -UF goal 2 to 3 liter  2.  Anemia  3.  Secondary hyperparathyroidism  4.  Afib    Continue MWF schedule    [facility] (arrive 2 pm)        Interval History:     Seen while on run.  Dialysis parameters reviewed with dialysis RN  IJ tunneled line at 400 ml/min.  2 liter UF.  3 K bath.  Requiring heparin    No patient complaints.  Stable run.  Anticipate next run outpatient    ROS     GENERAL: NAD, No fever,chills  R: NEGATIVE for significant cough or SOB  CV: NEGATIVE for chest pain, palpitations  EXT: no change edema  ROS otherwise negative    Dialysis Parameters:     Vitals were reviewed  Patient Vitals for the past 8 hrs:   SpO2 Weight   10/18/17 0807 95 % -   10/18/17 0432 - 62.2 kg (137 lb 2 oz)     I/O last 3 completed shifts:  In: 520 [P.O.:520]  Out: 900 [Urine:500; Other:400]    Vitals:    10/15/17 0700 10/16/17 0634 10/16/17 0815 10/17/17 0500   Weight: 63.9 kg (140 lb 14 oz) 63.5 kg (139 lb 15.9 oz) 63.5 kg (139 lb 15.9 oz) 62.9 kg (138 lb 9.6 oz)    10/18/17 0432   Weight: 62.2 kg (137 lb 2 oz)       Current Weight: 62.2  Dry Weight: 61 range  Dialysis Temp: 36.5  C  Access Device: IJ tunnled  Access Site: right  Dialyzer: Revaclear  Dialysis Bath: 3  Sodium Profile: n  UF Goal: 2  Blood Flow Rate (mL/min): 400  Total Treatment Time (hrs): 3  Heparin: Low dose as required      EPO dose: n  Zemplar: n  IV Fe: n      Medications and Allergies:     Reviewed      Physical Exam:     Seen and examined during course of dialysis run    /49  Pulse 70  Temp 98.1  F (36.7  C) (Oral)  Resp 16  Wt  62.2 kg (137 lb 2 oz)  SpO2 95%  BMI 23.54 kg/m2    GENERAL: awake, alert, follows  HEENT: NC/AT, PERRLA, EOMI, non icteric, pharynx moist without lesion  NECK: supple, no masses or adenopathy  RESP: symmetric excursion, good effort, lungs clear - no rales, rhonchi or wheezes  CV: RRR, normal S1 S2  ABDOMEN: S/NT, BS present  MS: no edema  SKIN: catheter site clean without drainage  NEURO: speech normal and cranial nerves 2-12 intact  PSYCH: affect normal/bright    Data:         Recent Labs  Lab 10/16/17  0545      POTASSIUM 4.3   CHLORIDE 111*   CO2 22   ANIONGAP 10   *   BUN 26   CR 4.51*   GFRESTIMATED 9*   GFRESTBLACK 11*   MALICK 7.4*     Recent Labs   Lab Test  10/16/17   0545  10/15/17   0700  10/14/17   0539  10/12/17   0456  10/11/17   0420  10/10/17   0409  10/09/17   0441  10/08/17   0411  10/07/17   1825  10/07/17   0442   CR  4.51*  3.69*  2.65*  2.79*  3.68*  2.80*  3.98*  3.28*  2.93*  3.51*       Recent Labs  Lab 10/16/17  0545 10/12/17  0456   ALBUMIN 2.2* 2.6*       Recent Labs  Lab 10/16/17  1020 10/16/17  0545 10/15/17  0748 10/15/17  0700 10/14/17  0539 10/12/17  1600 10/12/17  0456   PHOS  --  4.9*  --  3.8  --  3.9  --    HGB 9.6*  --  10.4*  --  8.7*  --  8.1*         G Urbano Galeas    [facility] - Nephrology  [phone]

## 2017-10-18 NOTE — DISCHARGE INSTRUCTIONS
Check in at [facility] in [facility]. On Friday October 29th be there at 1030 am    Patient will discharge to [facility] today via family.  Angela Grullon phone number is [phone].

## 2017-10-18 NOTE — PLAN OF CARE
Problem: Patient Care Overview  Goal: Plan of Care/Patient Progress Review  Physical Therapy Discharge Summary     Reason for therapy discharge:    Discharged to transitional care facility.     Progress towards therapy goal(s). See goals on Care Plan in [facility] electronic health record for goal details.  Goals partially met.  Barriers to achieving goals:   limited tolerance for therapy.     Therapy recommendation(s):    Continued therapy is recommended.  Rationale/Recommendations:  to maximize functional mob I and safety.

## 2017-10-18 NOTE — PLAN OF CARE
Problem: Patient Care Overview  Goal: Plan of Care/Patient Progress Review  Outcome: Improving  A/O, A1 w/walker, VSS. Dialysis today. DM2. DC to TCU today.

## 2017-10-19 ENCOUNTER — PRE VISIT (OUTPATIENT)
Dept: CARDIOLOGY | Facility: CLINIC | Age: 77
End: 2017-10-19

## 2017-10-19 ENCOUNTER — TELEPHONE (OUTPATIENT)
Dept: GERIATRICS | Facility: CLINIC | Age: 77
End: 2017-10-19

## 2017-10-19 DIAGNOSIS — I50.32 CHRONIC DIASTOLIC HEART FAILURE (H): Primary | ICD-10-CM

## 2017-10-19 LAB — INTERPRETATION ECG - MUSE: NORMAL

## 2017-10-19 NOTE — PLAN OF CARE
Problem: Patient Care Overview  Goal: Plan of Care/Patient Progress Review  Occupational Therapy Discharge Summary     Reason for therapy discharge:    Discharged to transitional care facility.     Progress towards therapy goal(s). See goals on Care Plan in [facility] electronic health record for goal details.  Goals not met.  Barriers to achieving goals:   discharge from facility.     Therapy recommendation(s):    Continued therapy is recommended.  Rationale/Recommendations:  To maximize I in ADL.

## 2017-10-19 NOTE — TELEPHONE ENCOUNTER
Patient admitted yesterday.   INRs:  10/19    1.8  10/15    1.29  Coumadin dosing:  10/18 & 10/17    4mg  10/16    3mg  10/15    4mg    Patient with recent history & treatment of c-diif, retested as negative on 10/2 however staff reports 3 watery/loose stools overnight, patient reports abdominal cramping.    Orders:  --coumadin 4mg 10/19 & 10/20 with INR recheck 10/21  --stool culture for c-diff  --update NP in morning    ELYSSA Ivy  10/19/17

## 2017-10-20 ENCOUNTER — NURSING HOME VISIT (OUTPATIENT)
Dept: GERIATRICS | Facility: CLINIC | Age: 77
End: 2017-10-20
Payer: COMMERCIAL

## 2017-10-20 VITALS
SYSTOLIC BLOOD PRESSURE: 117 MMHG | HEART RATE: 65 BPM | DIASTOLIC BLOOD PRESSURE: 64 MMHG | RESPIRATION RATE: 20 BRPM | TEMPERATURE: 97.5 F

## 2017-10-20 DIAGNOSIS — I48.0 PAROXYSMAL ATRIAL FIBRILLATION (H): Primary | ICD-10-CM

## 2017-10-20 DIAGNOSIS — F41.9 ANXIETY: ICD-10-CM

## 2017-10-20 DIAGNOSIS — Z99.2 ESRD (END STAGE RENAL DISEASE) ON DIALYSIS (H): ICD-10-CM

## 2017-10-20 DIAGNOSIS — R19.7 DIARRHEA, UNSPECIFIED TYPE: ICD-10-CM

## 2017-10-20 DIAGNOSIS — R53.81 PHYSICAL DECONDITIONING: ICD-10-CM

## 2017-10-20 DIAGNOSIS — I50.32 CHRONIC DIASTOLIC CONGESTIVE HEART FAILURE (H): ICD-10-CM

## 2017-10-20 DIAGNOSIS — N18.6 ESRD (END STAGE RENAL DISEASE) ON DIALYSIS (H): ICD-10-CM

## 2017-10-20 DIAGNOSIS — E11.8 TYPE 2 DIABETES MELLITUS WITH COMPLICATION, WITHOUT LONG-TERM CURRENT USE OF INSULIN (H): ICD-10-CM

## 2017-10-20 DIAGNOSIS — I25.10 ATHEROSCLEROSIS OF NATIVE CORONARY ARTERY OF NATIVE HEART WITHOUT ANGINA PECTORIS: ICD-10-CM

## 2017-10-20 DIAGNOSIS — R11.0 NAUSEA: ICD-10-CM

## 2017-10-20 LAB
BACTERIA SPEC CULT: NO GROWTH
BACTERIA SPEC CULT: NO GROWTH
Lab: NORMAL
Lab: NORMAL
SPECIMEN SOURCE: NORMAL
SPECIMEN SOURCE: NORMAL

## 2017-10-20 PROCEDURE — 99207 ZZC CDG-CORRECTLY CODED, REVIEWED AND AGREE: CPT | Performed by: NURSE PRACTITIONER

## 2017-10-20 PROCEDURE — 99310 SBSQ NF CARE HIGH MDM 45: CPT | Performed by: NURSE PRACTITIONER

## 2017-10-20 RX ORDER — NYSTATIN 100000 [USP'U]/G
POWDER TOPICAL 2 TIMES DAILY PRN
COMMUNITY
End: 2017-11-03

## 2017-10-20 NOTE — PROGRESS NOTES
[facility]  PRIMARY CARE PROVIDER AND CLINIC:  [facility] [address]  / JEFFERY KRAMER 5*  Chief Complaint   Patient presents with     Hospital F/U       HPI:    Kathryn Banks is a 77 year old  (1940),admitted to the [facility]  from [facility].  Hospital stay 10/2/17 through 10/18/17.  Admitted to this facility for  rehab, medical management and nursing care.  HPI information obtained from: facility chart records, facility staff, patient report and [facility] chart review.      [facility] HOSPITAL SUMMARY: COPIED FROM DISCHARGE SUMMARY.  Ms Kathryn Banks  is a 77-year-old female with a history of diastolic congestive heart failure, EF 60% to 65%, type 2 non-insulin dependent diabetes mellitus, hemoglobin A1c of 7.6, paroxysmal atrial fibrillation, coronary artery disease, status post 5-vessel coronary artery bypass graft in the remote past who was admitted on 10/2/2017 from an outside hospital for evaluation and management of sepsis likely secondary to pneumonia. Upon arrival she was requiring vasopressor support, which has been weaned off. Intermittent hemodialysis was initiated on 10/2/2017 out of concern for acute on chronic renal failure, hyperkalemia, and metabolic acidosis. Her oxygenation has improved and she is currently on HF nasal cannula. Chest CT on 10/10/2017 noted continued bilateral pleural effusions, left greater than right.       Current issues are:         Paroxysmal atrial fibrillation (H)  Chronic diastolic congestive heart failure (H)  Atherosclerosis of native coronary artery of native heart without angina pectoris  ESRD (end stage renal disease) on dialysis (H)  Nausea  Diarrhea, unspecified type  Anxiety  Type 2 diabetes mellitus with complication, without long-term current use of insulin (H)    CODE STATUS/ADVANCE DIRECTIVES DISCUSSION:   CPR/Full code   Patient's living condition: lives  with spouse    ALLERGIES:Ciprofloxacin; Oxycodone; Lisinopril; and Sulfa drugs  PAST MEDICAL HISTORY:  has a past medical history of Carpal tunnel syndrome; Coronary atherosclerosis of native coronary artery (2006); OBSTRUCTIVE SLEEP APNEA; Osteoarthrosis, unspecified whether generalized or localized, unspecified site; Other and combined forms of senile cataract (2000); Other and unspecified hyperlipidemia; RESTLESS LEGS SYNDROME; TENOSYNOV HAND/WRIST NEC (6/30/2006); Type II or unspecified type diabetes mellitus without mention of complication, not stated as uncontrolled (2001); Typical atrial flutter (H) (01/17/2007); and Unspecified essential hypertension.  PAST SURGICAL HISTORY:  has a past surgical history that includes TOTAL ABDOM HYSTERECTOMY (1995); REMOVAL OF OVARY/TUBE(S); APPENDECTOMY; REVISE MEDIAN N/CARPAL TUNNEL SURG; SOTO W/O FACETEC FORAMOT/DSKC 1/2 VRT SEG, LUMBAR (1968); VAGOTOMY/PYLOROPLASTY,SELECT (1970); REMV CATARACT INTRACAP,INSERT LENS; COLONOSCOPY THRU STOMA, DIAGNOSTIC (10/7/04); endoscopy (03/21/2000); Colonoscopy w/wo [place] **Performed** (10/31/07); UGI ENDOSCOPY DIAG W BIOPSY (10/31/07); colonoscopy (12/3/2007); UPPER GI ENDOSCOPY,EXAM (12/3/2007); cystoscopy (11/25/09); angioplasty; CABG, VEIN, FIVE (12/06); Colonoscopy (1/17/2014); and Open reduction internal fixation hip nailing (Left, 7/3/2016).  FAMILY HISTORY: family history includes Blood Disease in her father; DIABETES in her father, maternal grandmother, and paternal grandmother; Neurologic Disorder in her father. There is no history of Hypertension or CEREBROVASCULAR DISEASE.  SOCIAL HISTORY:  reports that she quit smoking about 48 years ago. She quit after 10.00 years of use. She has never used smokeless tobacco. She reports that she drinks alcohol. She reports that she does not use illicit drugs.    Post Discharge Medication Reconciliation Status: discharge medications reconciled, continue medications without change.  Current  Outpatient Prescriptions   Medication Sig Dispense Refill     AMIODARONE HCL PO Take 200 mg by mouth daily And take 400mg by mouth 2 times daily for 6 days       nystatin (MYCOSTATIN) 667786 UNIT/GM POWD Apply topically 2 times daily       cholecalciferol 5000 UNITS CAPS Take 1 capsule (5,000 Units) by mouth daily  3     clonazePAM (KLONOPIN) 0.5 MG tablet Take 0.5 tablets (0.25 mg) by mouth 2 times daily as needed for anxiety 14 tablet 0     warfarin (COUMADIN) 1 MG tablet Take 4 tablets (4 mg) by mouth daily for 2 days 8 tablet 0     darbepoetin hira (ARANESP, ALBUMIN FREE,) 60 MCG/0.3ML injection Inject 0.3 mLs (60 mcg) Subcutaneous every 14 days As needed for hgb<10g/dL.  If Hgb increases >1 point in 2 weeks (if blood transfusion given, use hgb PRIOR to this), SYSTOLIC BP > 180 mmHg or hgb>=10g/dL, HOLD DOSE. Dose must be within 1 week of Hgb.  Per anemia protocol with Vibha Barfield MD/Yesy Samaniego,PharmD [phone] 0.3 mL 99     pramipexole (MIRAPEX) 0.125 MG tablet Take 1 tablet (0.125 mg) by mouth 3 times daily 90 tablet 3     fluticasone (FLONASE) 50 MCG/ACT spray Spray 1 spray into both nostrils daily 1 Bottle 0     Calcium Carb-Cholecalciferol 500-400 MG-UNIT TABS Take 1 tablet by mouth 2 times daily       ipratropium - albuterol 0.5 mg/2.5 mg/3 mL (DUONEB) 0.5-2.5 (3) MG/3ML neb solution Take 1 vial by nebulization 3 times daily       ACETAMINOPHEN PO Take 650 mg by mouth every 4 hours as needed for pain For pain 1-5 out of 10       nitroGLYcerin (NITROSTAT) 0.4 MG sublingual tablet Place 1 tablet (0.4 mg) under the tongue every 5 minutes as needed for chest pain 25 tablet 0     pravastatin (PRAVACHOL) 40 MG tablet Take 1 tablet (40 mg) by mouth daily 90 tablet 3     metoprolol (LOPRESSOR) 50 MG tablet Take 0.5 tablets (25 mg) by mouth 2 times daily 180 tablet 3     calcium acetate (PHOSLO) 667 MG CAPS capsule Take 1 capsule (667 mg) by mouth 3 times daily (with meals) (Patient taking differently: Take  1,334 mg by mouth 3 times daily (with meals) ) 180 capsule      pentoxifylline (TRENTAL) 400 MG CR tablet Take 1 tablet (400 mg) by mouth daily       Lactobacillus (ACIDOPHILUS) tablet Take 1 tablet by mouth daily       ORDER FOR DME Equipment being ordered: cpap machine, mask, humidifier, tubing and filters 1 Device 0     [DISCONTINUED] amiodarone 400 MG TABS Take 400 mg by mouth 2 times daily for 6 days 12 tablet 0     [DISCONTINUED] amiodarone (PACERONE/CODARONE) 200 MG tablet Take 1 tablet (200 mg) by mouth daily 30 tablet 1       ROS:  10 point ROS of systems including Constitutional, Eyes, Respiratory, Cardiovascular, Gastroenterology, Genitourinary, Integumentary, Muscularskeletal, Psychiatric were all negative except for pertinent positives noted in my HPI.    Exam:  /64  Pulse 65  Temp 97.5  F (36.4  C)  Resp 20  GENERAL APPEARANCE:  Alert, in no distress  RESP:  respiratory effort and palpation of chest normal, no respiratory distress, lungs sounds clear  CV:  Palpation and auscultation of heart done , regular rate and rhythm, no murmur, rub, or gallop, edema none  ABDOMEN:  normal bowel sounds, soft, tender, non distended.   M/S:  Gait and station unsafe without assistance, no tenderness or swelling of the joints   SKIN:  Inspection and palpation of skin and subcutaneous tissue at baseline  PSYCH:  insight and judgement, memory intact, affect and mood normal    Lab/Diagnostic data:    CBC RESULTS:   Recent Labs   Lab Test  10/16/17   1020  10/15/17   0748  10/14/17   0539  10/12/17   0456   WBC   --    --   7.4  9.4   RBC   --    --   3.11*  2.91*   HGB  9.6*  10.4*  8.7*  8.1*   HCT   --    --   28.5*  26.6*   MCV   --    --   92  91   MCH   --    --   28.0  27.8   MCHC   --    --   30.5*  30.5*   RDW   --    --   19.0*  18.6*   PLT   --    --   205  161       Last Basic Metabolic Panel:  Recent Labs   Lab Test  10/16/17   0545  10/15/17   0700   NA  143  144   POTASSIUM  4.3  3.9   CHLORIDE   111*  111*   MALICK  7.4*  7.7*   CO2  22  25   BUN  26  18   CR  4.51*  3.69*   GLC  102*  94       Liver Function Studies -   Recent Labs   Lab Test  10/16/17   0545  10/12/17   1040  10/12/17   0456  10/11/17   0420   PROTTOTAL   --   5.1*  5.0*  4.9*   ALBUMIN  2.2*   --   2.6*  1.9*   BILITOTAL   --    --   0.4  0.3   ALKPHOS   --    --   128  145   AST   --    --   9  12   ALT   --    --   8  9       TSH   Date Value Ref Range Status   10/11/2017 5.94 (H) 0.40 - 4.00 mU/L Final   04/28/2017 0.75 0.40 - 4.00 mU/L Final   ]    Lab Results   Component Value Date    A1C 6.6 10/13/2017    A1C 7.3 06/22/2017       ASSESSMENT/PLAN:  Physical deconditioning  Admitted to TCU for Physical Therapy and Ocupational Therapy after prolonged hospital stay for pneumonia, Acute respiratory failure and end stage renal disease requiring dialysis.  - Physical Therapy / Ocupational Therapy     Paroxysmal atrial fibrillation (H)  Hx of ablation in July 2017. Amiodarone started on 10/14.   - continues on metoprolol, amiodarone, warfarin for clot prevention.  - follow up with cardiology on 10/26    Chronic diastolic congestive heart failure (H)  EF 60 - 65%, pulmonary htn, diastolic dysfunction, and mild - mod RV function.   - continue dialysis for fluid management  - continue metoprolol     Atherosclerosis of native coronary artery of native heart without angina pectoris  Hx of MI, continue pravastatin    ESRD (end stage renal disease) on dialysis (H)  New to dialysis. Getting dialysis on Monday, Wednesday, Friday. Has appt in November with Dr. Barfield of nephrology.      Nausea / / Diarrhea, unspecified type / / abdominal cramping  C/o nausea and given a dose of zofran which has helped. Also has abd cramping with quite tender abd. She has been having diarrhea and nursing is checking for c. Diff but a specimen has not been collected yet. Patient has a hx of c. Diff, was recently hospitalized and on abx.     Anxiety  Uses clonazepam prn  for anxiety with dialysis    Type 2 diabetes mellitus with complication, without long-term current use of insulin (H)  Diet controlled  - continue diabetic, renal diet       Orders:  1. Zofran 4mg PO every 6hrs prn    Electronically signed by:  Hortensia Alcala NP

## 2017-10-21 ENCOUNTER — TELEPHONE (OUTPATIENT)
Dept: INTERNAL MEDICINE | Facility: CLINIC | Age: 77
End: 2017-10-21

## 2017-10-21 NOTE — TELEPHONE ENCOUNTER
INR result:  2.9  Coumadin for atrial fib  Last dose 4mg trending up  Orders: give 3.5mg QD recheck Monday

## 2017-10-21 NOTE — TELEPHONE ENCOUNTER
RN called for symptomatic orthostatic BP, has been going on since admission. Metoprolol stopped yesterday.   SBP dropped to mid 80's with nausea/epigastric distress.     Amiodarone 400 mg bid for 6 days started at the hospital until 10/24. Also on 200 mg amiodarone daily.     Plan:  - will reduce Amiodarone 400 mg bid to 200 mg bid, continue current 200 mg daily .  - Hold amiodarone if SBP < 90  - fall precaution.   - Mylanta prn for bloating x 3 days.   - Update Cardio on Monday regarding changes in Amiodarone doses.

## 2017-10-22 ENCOUNTER — HOSPITAL ENCOUNTER (INPATIENT)
Facility: CLINIC | Age: 77
LOS: 10 days | Discharge: NURSING, REHAB OR LONG-TERM CARE | DRG: 329 | End: 2017-11-01
Attending: EMERGENCY MEDICINE | Admitting: INTERNAL MEDICINE
Payer: MEDICARE

## 2017-10-22 ENCOUNTER — APPOINTMENT (OUTPATIENT)
Dept: CT IMAGING | Facility: CLINIC | Age: 77
DRG: 329 | End: 2017-10-22
Attending: EMERGENCY MEDICINE
Payer: MEDICARE

## 2017-10-22 DIAGNOSIS — H04.123 DRY EYES: ICD-10-CM

## 2017-10-22 DIAGNOSIS — K21.9 GASTROESOPHAGEAL REFLUX DISEASE WITHOUT ESOPHAGITIS: ICD-10-CM

## 2017-10-22 DIAGNOSIS — F41.9 ANXIETY: ICD-10-CM

## 2017-10-22 DIAGNOSIS — I48.92 ATRIAL FLUTTER, UNSPECIFIED TYPE (H): Primary | ICD-10-CM

## 2017-10-22 DIAGNOSIS — K56.601 COMPLETE INTESTINAL OBSTRUCTION, UNSPECIFIED CAUSE (H): ICD-10-CM

## 2017-10-22 PROBLEM — K56.609 SBO (SMALL BOWEL OBSTRUCTION) (H): Status: ACTIVE | Noted: 2017-10-22

## 2017-10-22 LAB
ALBUMIN SERPL-MCNC: 2.5 G/DL (ref 3.4–5)
ALP SERPL-CCNC: 180 U/L (ref 40–150)
ALT SERPL W P-5'-P-CCNC: 9 U/L (ref 0–50)
ANION GAP SERPL CALCULATED.3IONS-SCNC: 10 MMOL/L (ref 3–14)
AST SERPL W P-5'-P-CCNC: 14 U/L (ref 0–45)
BASOPHILS # BLD AUTO: 0 10E9/L (ref 0–0.2)
BASOPHILS NFR BLD AUTO: 0.4 %
BILIRUB SERPL-MCNC: 0.3 MG/DL (ref 0.2–1.3)
BUN SERPL-MCNC: 21 MG/DL (ref 7–30)
CALCIUM SERPL-MCNC: 8.2 MG/DL (ref 8.5–10.1)
CHLORIDE SERPL-SCNC: 96 MMOL/L (ref 94–109)
CO2 SERPL-SCNC: 30 MMOL/L (ref 20–32)
CREAT SERPL-MCNC: 3.91 MG/DL (ref 0.52–1.04)
DIFFERENTIAL METHOD BLD: ABNORMAL
EOSINOPHIL # BLD AUTO: 0.1 10E9/L (ref 0–0.7)
EOSINOPHIL NFR BLD AUTO: 1.1 %
ERYTHROCYTE [DISTWIDTH] IN BLOOD BY AUTOMATED COUNT: 16.4 % (ref 10–15)
GFR SERPL CREATININE-BSD FRML MDRD: 11 ML/MIN/1.7M2
GLUCOSE BLDC GLUCOMTR-MCNC: 103 MG/DL (ref 70–99)
GLUCOSE BLDC GLUCOMTR-MCNC: 104 MG/DL (ref 70–99)
GLUCOSE BLDC GLUCOMTR-MCNC: 105 MG/DL (ref 70–99)
GLUCOSE SERPL-MCNC: 110 MG/DL (ref 70–99)
HCT VFR BLD AUTO: 33.1 % (ref 35–47)
HGB BLD-MCNC: 10.4 G/DL (ref 11.7–15.7)
IMM GRANULOCYTES # BLD: 0 10E9/L (ref 0–0.4)
IMM GRANULOCYTES NFR BLD: 0.1 %
INR PPP: 1.45 (ref 0.86–1.14)
INR PPP: 3.19 (ref 0.86–1.14)
LIPASE SERPL-CCNC: 124 U/L (ref 73–393)
LYMPHOCYTES # BLD AUTO: 1.4 10E9/L (ref 0.8–5.3)
LYMPHOCYTES NFR BLD AUTO: 19.2 %
MCH RBC QN AUTO: 27.7 PG (ref 26.5–33)
MCHC RBC AUTO-ENTMCNC: 31.4 G/DL (ref 31.5–36.5)
MCV RBC AUTO: 88 FL (ref 78–100)
MONOCYTES # BLD AUTO: 0.7 10E9/L (ref 0–1.3)
MONOCYTES NFR BLD AUTO: 9.6 %
NEUTROPHILS # BLD AUTO: 5.2 10E9/L (ref 1.6–8.3)
NEUTROPHILS NFR BLD AUTO: 69.6 %
NRBC # BLD AUTO: 0 10*3/UL
NRBC BLD AUTO-RTO: 0 /100
PLATELET # BLD AUTO: 357 10E9/L (ref 150–450)
POTASSIUM SERPL-SCNC: 4.1 MMOL/L (ref 3.4–5.3)
PROT SERPL-MCNC: 6.2 G/DL (ref 6.8–8.8)
RBC # BLD AUTO: 3.75 10E12/L (ref 3.8–5.2)
SODIUM SERPL-SCNC: 136 MMOL/L (ref 133–144)
WBC # BLD AUTO: 7.4 10E9/L (ref 4–11)

## 2017-10-22 PROCEDURE — 40000275 ZZH STATISTIC RCP TIME EA 10 MIN

## 2017-10-22 PROCEDURE — 96366 THER/PROPH/DIAG IV INF ADDON: CPT

## 2017-10-22 PROCEDURE — 94640 AIRWAY INHALATION TREATMENT: CPT | Mod: 76

## 2017-10-22 PROCEDURE — 94640 AIRWAY INHALATION TREATMENT: CPT

## 2017-10-22 PROCEDURE — 99285 EMERGENCY DEPT VISIT HI MDM: CPT | Mod: 25

## 2017-10-22 PROCEDURE — 96376 TX/PRO/DX INJ SAME DRUG ADON: CPT

## 2017-10-22 PROCEDURE — 00000146 ZZHCL STATISTIC GLUCOSE BY METER IP

## 2017-10-22 PROCEDURE — 25000132 ZZH RX MED GY IP 250 OP 250 PS 637: Mod: GY | Performed by: INTERNAL MEDICINE

## 2017-10-22 PROCEDURE — 25000128 H RX IP 250 OP 636: Performed by: INTERNAL MEDICINE

## 2017-10-22 PROCEDURE — 85025 COMPLETE CBC W/AUTO DIFF WBC: CPT | Performed by: EMERGENCY MEDICINE

## 2017-10-22 PROCEDURE — 96365 THER/PROPH/DIAG IV INF INIT: CPT

## 2017-10-22 PROCEDURE — 99221 1ST HOSP IP/OBS SF/LOW 40: CPT | Performed by: SURGERY

## 2017-10-22 PROCEDURE — 36415 COLL VENOUS BLD VENIPUNCTURE: CPT | Performed by: INTERNAL MEDICINE

## 2017-10-22 PROCEDURE — 74176 CT ABD & PELVIS W/O CONTRAST: CPT

## 2017-10-22 PROCEDURE — 25000125 ZZHC RX 250: Performed by: INTERNAL MEDICINE

## 2017-10-22 PROCEDURE — A9270 NON-COVERED ITEM OR SERVICE: HCPCS | Mod: GY | Performed by: INTERNAL MEDICINE

## 2017-10-22 PROCEDURE — 83690 ASSAY OF LIPASE: CPT | Performed by: EMERGENCY MEDICINE

## 2017-10-22 PROCEDURE — 25000128 H RX IP 250 OP 636: Performed by: EMERGENCY MEDICINE

## 2017-10-22 PROCEDURE — 85610 PROTHROMBIN TIME: CPT | Performed by: INTERNAL MEDICINE

## 2017-10-22 PROCEDURE — 96375 TX/PRO/DX INJ NEW DRUG ADDON: CPT

## 2017-10-22 PROCEDURE — 99222 1ST HOSP IP/OBS MODERATE 55: CPT | Performed by: SURGERY

## 2017-10-22 PROCEDURE — 80053 COMPREHEN METABOLIC PANEL: CPT | Performed by: EMERGENCY MEDICINE

## 2017-10-22 PROCEDURE — 85610 PROTHROMBIN TIME: CPT | Performed by: EMERGENCY MEDICINE

## 2017-10-22 PROCEDURE — 12000000 ZZH R&B MED SURG/OB

## 2017-10-22 PROCEDURE — 99223 1ST HOSP IP/OBS HIGH 75: CPT | Mod: AI | Performed by: INTERNAL MEDICINE

## 2017-10-22 RX ORDER — NALOXONE HYDROCHLORIDE 0.4 MG/ML
.1-.4 INJECTION, SOLUTION INTRAMUSCULAR; INTRAVENOUS; SUBCUTANEOUS
Status: DISCONTINUED | OUTPATIENT
Start: 2017-10-22 | End: 2017-11-01 | Stop reason: HOSPADM

## 2017-10-22 RX ORDER — METOPROLOL TARTRATE 1 MG/ML
5 INJECTION, SOLUTION INTRAVENOUS EVERY 6 HOURS
Status: DISCONTINUED | OUTPATIENT
Start: 2017-10-22 | End: 2017-10-29

## 2017-10-22 RX ORDER — ONDANSETRON 4 MG/1
4 TABLET, ORALLY DISINTEGRATING ORAL EVERY 6 HOURS PRN
Status: ON HOLD | COMMUNITY
End: 2017-10-31

## 2017-10-22 RX ORDER — SODIUM CHLORIDE 9 MG/ML
INJECTION, SOLUTION INTRAVENOUS CONTINUOUS
Status: DISCONTINUED | OUTPATIENT
Start: 2017-10-22 | End: 2017-10-25

## 2017-10-22 RX ORDER — SODIUM POLYSTYRENE SULFONATE 15 G/60ML
30 SUSPENSION ORAL; RECTAL
Status: ON HOLD | COMMUNITY
End: 2017-10-31

## 2017-10-22 RX ORDER — AMIODARONE HYDROCHLORIDE 200 MG/1
200 TABLET ORAL 2 TIMES DAILY
Status: DISCONTINUED | OUTPATIENT
Start: 2017-10-22 | End: 2017-11-01 | Stop reason: HOSPADM

## 2017-10-22 RX ORDER — PROCHLORPERAZINE 25 MG
12.5 SUPPOSITORY, RECTAL RECTAL EVERY 12 HOURS PRN
Status: DISCONTINUED | OUTPATIENT
Start: 2017-10-22 | End: 2017-11-01 | Stop reason: HOSPADM

## 2017-10-22 RX ORDER — ONDANSETRON 2 MG/ML
4 INJECTION INTRAMUSCULAR; INTRAVENOUS EVERY 6 HOURS PRN
Status: DISCONTINUED | OUTPATIENT
Start: 2017-10-22 | End: 2017-11-01 | Stop reason: HOSPADM

## 2017-10-22 RX ORDER — MORPHINE SULFATE 2 MG/ML
4 INJECTION, SOLUTION INTRAMUSCULAR; INTRAVENOUS
Status: DISCONTINUED | OUTPATIENT
Start: 2017-10-22 | End: 2017-10-22

## 2017-10-22 RX ORDER — DEXTROSE MONOHYDRATE 25 G/50ML
25-50 INJECTION, SOLUTION INTRAVENOUS
Status: DISCONTINUED | OUTPATIENT
Start: 2017-10-22 | End: 2017-11-01 | Stop reason: HOSPADM

## 2017-10-22 RX ORDER — FLUTICASONE PROPIONATE 50 MCG
1 SPRAY, SUSPENSION (ML) NASAL DAILY
Status: DISCONTINUED | OUTPATIENT
Start: 2017-10-22 | End: 2017-11-01 | Stop reason: HOSPADM

## 2017-10-22 RX ORDER — PROCHLORPERAZINE MALEATE 5 MG
5 TABLET ORAL EVERY 6 HOURS PRN
Status: DISCONTINUED | OUTPATIENT
Start: 2017-10-22 | End: 2017-11-01 | Stop reason: HOSPADM

## 2017-10-22 RX ORDER — BISACODYL 10 MG
10 SUPPOSITORY, RECTAL RECTAL DAILY PRN
Status: DISCONTINUED | OUTPATIENT
Start: 2017-10-22 | End: 2017-10-28

## 2017-10-22 RX ORDER — LIDOCAINE HYDROCHLORIDE 40 MG/ML
SOLUTION TOPICAL
Status: DISCONTINUED
Start: 2017-10-22 | End: 2017-10-22 | Stop reason: HOSPADM

## 2017-10-22 RX ORDER — ALUMINA, MAGNESIA, AND SIMETHICONE 2400; 2400; 240 MG/30ML; MG/30ML; MG/30ML
10 SUSPENSION ORAL EVERY 6 HOURS PRN
Status: ON HOLD | COMMUNITY
End: 2017-10-31

## 2017-10-22 RX ORDER — NYSTATIN 100000 [USP'U]/G
POWDER TOPICAL 2 TIMES DAILY
Status: DISCONTINUED | OUTPATIENT
Start: 2017-10-22 | End: 2017-10-22 | Stop reason: CLARIF

## 2017-10-22 RX ORDER — NICOTINE POLACRILEX 4 MG
15-30 LOZENGE BUCCAL
Status: DISCONTINUED | OUTPATIENT
Start: 2017-10-22 | End: 2017-11-01 | Stop reason: HOSPADM

## 2017-10-22 RX ORDER — IPRATROPIUM BROMIDE AND ALBUTEROL SULFATE 2.5; .5 MG/3ML; MG/3ML
1 SOLUTION RESPIRATORY (INHALATION) 3 TIMES DAILY
Status: DISCONTINUED | OUTPATIENT
Start: 2017-10-22 | End: 2017-10-23

## 2017-10-22 RX ORDER — ONDANSETRON 4 MG/1
4 TABLET, ORALLY DISINTEGRATING ORAL EVERY 6 HOURS PRN
Status: DISCONTINUED | OUTPATIENT
Start: 2017-10-22 | End: 2017-11-01 | Stop reason: HOSPADM

## 2017-10-22 RX ORDER — HYDROMORPHONE HYDROCHLORIDE 1 MG/ML
.3-.5 INJECTION, SOLUTION INTRAMUSCULAR; INTRAVENOUS; SUBCUTANEOUS
Status: DISCONTINUED | OUTPATIENT
Start: 2017-10-22 | End: 2017-10-30

## 2017-10-22 RX ORDER — NITROGLYCERIN 0.4 MG/1
0.4 TABLET SUBLINGUAL EVERY 5 MIN PRN
Status: DISCONTINUED | OUTPATIENT
Start: 2017-10-22 | End: 2017-11-01 | Stop reason: HOSPADM

## 2017-10-22 RX ADMIN — IPRATROPIUM BROMIDE AND ALBUTEROL SULFATE 3 ML: .5; 3 SOLUTION RESPIRATORY (INHALATION) at 20:10

## 2017-10-22 RX ADMIN — HYDROMORPHONE HYDROCHLORIDE 0.3 MG: 1 INJECTION, SOLUTION INTRAMUSCULAR; INTRAVENOUS; SUBCUTANEOUS at 13:05

## 2017-10-22 RX ADMIN — FLUTICASONE PROPIONATE 1 SPRAY: 50 SPRAY, METERED NASAL at 13:14

## 2017-10-22 RX ADMIN — IPRATROPIUM BROMIDE AND ALBUTEROL SULFATE 3 ML: .5; 3 SOLUTION RESPIRATORY (INHALATION) at 13:16

## 2017-10-22 RX ADMIN — TAZOBACTAM SODIUM AND PIPERACILLIN SODIUM 2.25 G: 250; 2 INJECTION, SOLUTION INTRAVENOUS at 07:51

## 2017-10-22 RX ADMIN — AMIODARONE HYDROCHLORIDE 200 MG: 200 TABLET ORAL at 21:06

## 2017-10-22 RX ADMIN — AMIODARONE HYDROCHLORIDE 200 MG: 200 TABLET ORAL at 13:03

## 2017-10-22 RX ADMIN — MICONAZOLE NITRATE: 2 POWDER TOPICAL at 13:14

## 2017-10-22 RX ADMIN — MICONAZOLE NITRATE: 2 POWDER TOPICAL at 21:10

## 2017-10-22 RX ADMIN — PHYTONADIONE 2 MG: 2 INJECTION, EMULSION INTRAMUSCULAR; INTRAVENOUS; SUBCUTANEOUS at 13:15

## 2017-10-22 RX ADMIN — METOPROLOL TARTRATE 5 MG: 5 INJECTION INTRAVENOUS at 19:19

## 2017-10-22 RX ADMIN — MORPHINE SULFATE 4 MG: 2 INJECTION, SOLUTION INTRAMUSCULAR; INTRAVENOUS at 09:02

## 2017-10-22 RX ADMIN — HYDROMORPHONE HYDROCHLORIDE 0.5 MG: 1 INJECTION, SOLUTION INTRAMUSCULAR; INTRAVENOUS; SUBCUTANEOUS at 21:06

## 2017-10-22 RX ADMIN — MORPHINE SULFATE 4 MG: 2 INJECTION, SOLUTION INTRAMUSCULAR; INTRAVENOUS at 03:34

## 2017-10-22 RX ADMIN — DEXTRAN 70, AND HYPROMELLOSE 2910 1 DROP: 1; 3 SOLUTION/ DROPS OPHTHALMIC at 21:06

## 2017-10-22 RX ADMIN — SODIUM CHLORIDE, PRESERVATIVE FREE: 5 INJECTION INTRAVENOUS at 11:48

## 2017-10-22 RX ADMIN — METOPROLOL TARTRATE 5 MG: 5 INJECTION INTRAVENOUS at 13:14

## 2017-10-22 ASSESSMENT — ENCOUNTER SYMPTOMS
CONSTIPATION: 1
DIARRHEA: 1
ABDOMINAL PAIN: 1

## 2017-10-22 NOTE — PROGRESS NOTES
SPIRITUAL HEALTH SERVICES Progress Note  [facility]    Visited with pt per consult. Consult also requested Advent communion so  put in a request for communion prior to visit. Pt would also like to see Fr. Swanson. Pt welcomed  and talked about her long history of illness and hospitalization. 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 Pt spoke of her children and grandchildren. Pt requested prayer. After  prayed for pt, pt offered a blessing for SH.  will put in a request for Father Anelbarbara. Pt would appreciate follow up but has no urgent needs.  will follow as available or by request.      Christina Rivera  Chaplain Resident

## 2017-10-22 NOTE — PROGRESS NOTES
Brief Surgical Note    Kathryn is a 78 yo female who presents to the ED with abdominal pain and nausea of ~2-3 days duration recently discharged after a significant hospitalization for pneumonia. A CT scan has been obtained demonstrating evidence of SBO with transition point in the pelvis. She has an extensive surgical history including appendectomy, hysterectomy and report of gastric bypass about 10 years ago.    Kathryn is relatively comfortable in a hospital bed and does not appear acutely ill. Her abdomen is distended and diffusely tender to palpation but no evidence of peritonitis requiring emergent surgery.    Please place NG tube ASAP for decompression of the proximal bowel with an initial plan to manage conservatively and see if her obstruction will resolve. Please check INR and hold coumadin.    Pt seen and discussed with Dr. Zafar. Full consult note to follow.    Odin Santos MD  General Surgery PGY4  [phone]          STAFF:  Pt examined and chart reviewed while in ED.  Recently discharge and now on dialysis q M-W_F.. She has felt poorly for several days with little stool.    SX: CARLEY-BSO, Appy, Open karolina-en Y Gastric bypass    Recently treated for C. Diff  Coumadin for AFib    Med list reviewed.    In TCU since recent discharge Was independent prior to this.    EXAM:  Comfortable R IJ dialysis catheter                Chest=clear                 Abd=distended, few BS, mild tenderness                  + pedal puses    CT with very dilated stomach, dilated small bowel loops with transition zone at pelvic brim.      LAB:  K=4.1   SCR=3.91 ([facility] on dialysis)  Alb=2.5  Lipase=124             WBC=7.4   Hgb=10.4  INR=3.19    PLAN:  NG placement.  Will see if SBO resolves.   Vit K to reverse Coumadin.              Dialysis in AM.. Reassess  at that time.  If no better she would need surgery.                    Suspect SBO due to old pelvic adhesion.       Rashard Zafar MD

## 2017-10-22 NOTE — IP AVS SNAPSHOT
Kathryn Banks #[id number] (CSN: [id number])  (77 year old F)  (Adm: 10/22/17)     SHDIA-Dialysis Pool Room-Dialysis Pool Room               [facility] DIALYSIS: [phone]            Patient Demographics     Patient Name Sex          Age SSN Address Phone    Kathryn Banks Female 1940 (77 year old) [id number] [address] [place] [address] [phone] (Home) *Preferred*  none (Work)  none (Mobile)      Emergency Contact(s)     Name Relation Home Work Mobile    Urbano Banks Spouse [phone] none none    yoshi roth Daughter [phone] none [phone]    Mimi Bankschild   [phone]      Admission Information     Attending Provider Admitting Provider Admission Type Admission Date/Time    Cara Fisher MD Tata, Sujatha, MD Emergency 10/22/17  0245    Discharge Date Hospital Service Auth/Cert Status Service Area     Hospitalist [facility]    Unit Room/Bed Admission Status        DIALYSIS Dialysis Pool Room/Dialysis Pool Room Admission (Confirmed)       Admission     Complaint    SBO (small bowel obstruction), BOWEL OBSTRUCTION, BOWEL OBSTRUCTION      Hospital Account     Name Acct ID Class Status Primary Coverage    Kathryn Banks [id number] Inpatient Open MEDICARE - MEDICARE FOR HB SUPPLEMENT            Guarantor Account (for Hospital Account #[id number])     Name Relation to Pt Service Area Active? Acct Type    Jocelyn Kathryn L  FCS Yes Personal/Family    Address Phone          [address] [place] [address] [phone](H)              Coverage Information (for Hospital Account #[id number])     1. MEDICARE/MEDICARE FOR HB SUPPLEMENT     F/O Payor/Plan Precert #    MEDICARE/MEDICARE FOR HB SUPPLEMENT     Subscriber Subscriber #    Kathryn Banks MICKY [id number]    Address Phone    ATTN CLAIMS  [address]  [facility] [place] [address] [phone]          2. BCBS/BCBS PLATINUM BLUE     F/O Payor/Plan  "Precert #    BCBS/BCBS PLATINUM BLUE     Subscriber Subscriber #    Kathryn Banks [id number]    Address Phone    [address] [address]  [place] [address] [phone]                                                      INTERAGENCY TRANSFER FORM - PHYSICIAN ORDERS   10/22/2017                       [facility] DIALYSIS: [phone]            Attending Provider: Cara Fisher MD     Allergies:  Ciprofloxacin, Oxycodone, Lisinopril, Sulfa Drugs, Penicillins    Infection:  None   Service:  HOSPITALIST    Ht:  1.626 m (5' 4\")   Wt:  70.4 kg (155 lb 3.3 oz)   Admission Wt:  65.8 kg (145 lb)    BMI:  26.64 kg/m 2   BSA:  1.78 m 2            ED Clinical Impression     Diagnosis Description Comment Added By Time Added    Complete intestinal obstruction, unspecified cause [K56.601] Complete intestinal obstruction, unspecified cause [K56.601]  Sienna Samaniego MD 10/22/2017  6:09 AM      Hospital Problems as of 11/1/2017              Priority Class Noted POA    ANABEL on CPAP Medium  Unknown Yes    Restless leg syndrome Low  Unknown Yes    Esophageal reflux Medium  3/28/2006 Yes    CAD - NSTEMI, CABG x 5 in 2006, angio 2013 patent grafts except occluded graft to PL Medium  2/4/2013 Yes    CRF (chronic renal failure), stage 4 (severe) (H) Medium  3/23/2015 Yes    Essential hypertension with goal blood pressure less than 140/90 Medium  9/13/2016 Yes    Acute on chronic renal failure -- Dialysis started 10/2017 Medium  6/12/2017 Yes    Anemia in chronic kidney disease Medium  9/20/2017 Yes    Paroxysmal atrial fibrillation -- on Warfarin Medium  10/20/2017 Yes    * (Principal)SBO -- S/P Lysis of Adhes 10/24/17 Medium  10/22/2017 Yes    Postoperative ileus (H) Medium  10/28/2017 Unknown    DM type 2 -- Hgb A1C 6.6 on 10/13/17 Medium  10/29/2017 Unknown      Non-Hospital Problems as of 11/1/2017              Priority Class Noted    Lipoma of other skin and subcutaneous tissue Medium  9/7/2004    Iron " deficiency anemia Medium  10/17/2007    History of peptic ulcer disease Low  10/17/2007    Kidney stone Medium  12/29/2009    Hyperlipidemia LDL goal <100 Medium  10/31/2010    Advanced directives, counseling/discussion Low  11/12/2012    Health Care Home Medium  1/20/2014    Lymphedema Medium  11/10/2014    Osteoarthritis of hip Medium  4/29/2015    Pulmonary hypertension Medium  5/24/2017    Chronic heart failure (H) with preserved EF Medium  5/31/2017    Atrial flutter (H) - present 6/12 Medium  5/31/2017    Hypertensive heart and chronic kidney disease with heart failure and stage 1 through stage 4 chronic kidney disease, or unspecified chronic kidney disease Medium  6/1/2017    Proteinuria 2.1 g/g Cr Medium  6/2/2017    Bradycardia Medium  6/2/2017    Memory loss Medium  6/12/2017    Blood in stool Medium  6/14/2017    Microscopic hematuria Medium  6/21/2017    CHF (congestive heart failure) (H) Medium  7/6/2017    Anemia, iron deficiency Medium  8/9/2017    Diarrhea, unspecified type Medium  10/20/2017    Anxiety Medium  10/20/2017      Code Status History     Date Active Date Inactive Code Status Order ID Comments User Context    10/31/2017  1:11 PM  Full Code [id number]  Emmanuel Stuart MD Outpatient    10/22/2017 11:25 AM 10/31/2017  1:11 PM Full Code [id number]  Cara Fisher MD Inpatient    10/18/2017  9:22 AM 10/22/2017 11:25 AM Full Code [id number]  Hank Babcock MD Outpatient    10/2/2017  3:17 AM 10/18/2017  9:22 AM Full Code [id number]  Beth Laird MD Inpatient    9/13/2017 10:58 PM 9/15/2017  7:27 PM Full Code [id number]  Mike Tadeo MD Inpatient    9/1/2017 11:47 AM 9/13/2017 10:58 PM Full Code [id number]  Mike Tadeo MD Outpatient    8/26/2017  9:59 AM 9/1/2017 11:47 AM Full Code [id number]  Cameron Burrell MD Inpatient    8/18/2017 11:32 AM 8/26/2017  9:59 AM Full Code [id number]  [place], MD Outpatient    8/12/2017  4:31 PM 8/18/2017 11:32 AM  Full Code [id number]  Gilda Muro MD Inpatient    7/11/2017  3:54 PM 8/12/2017  4:31 PM Full Code [id number]  Damian Contreras MD Outpatient    7/6/2017 10:16 PM 7/11/2017  3:54 PM Full Code [id number]  Maegan Xavier MD Inpatient    6/21/2017  2:14 PM 6/25/2017  3:28 PM Full Code [id number]  Erin Pinedo MD Inpatient    6/15/2017  4:01 PM 6/21/2017  2:14 PM Full Code [id number]  Arash Silva MD Outpatient    6/12/2017  8:17 PM 6/15/2017  4:01 PM Full Code [id number]  Mike Wong MD Inpatient    6/6/2017  1:12 PM 6/12/2017  8:17 PM Full Code [id number]  Kenji Fish MD Outpatient    5/31/2017  9:20 PM 6/6/2017  1:12 PM Full Code [id number]  Mike Wong MD Inpatient    5/2/2017 10:00 AM 5/31/2017  9:20 PM Full Code [id number]  Jian Hartley MD Outpatient    4/28/2017  7:16 PM 5/2/2017 10:00 AM Full Code [id number]  Jian Hartley MD Inpatient    7/6/2016  1:46 PM 4/28/2017  7:16 PM Full Code [id number]  Isadora Mosley PA-C Outpatient    7/6/2016 10:41 AM 7/6/2016  1:46 PM Full Code [id number]  Arsah Silva MD Outpatient    7/3/2016 11:12 AM 7/6/2016 10:41 AM Full Code [id number]  Isadora Mosley PA-C Inpatient    7/2/2016  5:08 AM 7/3/2016 11:12 AM Full Code [id number]  Mike Wong MD Inpatient    3/23/2015  7:40 PM 3/25/2015  1:40 PM Full Code [id number]  Luisa Adan MD Inpatient    12/28/2014  9:42 AM 3/23/2015  7:40 PM Full Code [id number]  Jian Hartley MD Outpatient    12/27/2014  4:26 PM 12/28/2014  9:42 AM Full Code [id number]  Mick Dale MD ED    12/9/2014  2:02 PM 12/27/2014  4:26 PM Full Code [id number]  Jian Hartley MD Outpatient    11/13/2014 11:04 AM 12/9/2014  2:02 PM Full Code [id number]  Jian Hartley MD Outpatient    11/10/2014  6:44 PM 11/13/2014 11:04 AM Full Code [id number]  Mike Wong MD Inpatient    9/14/2014  1:12 PM 11/10/2014  6:44 PM Full Code [id number]  Jian Hartley MD  Outpatient    9/12/2014  1:02 PM 9/14/2014  1:12 PM Full Code [id number]  Radha Field PA-C Inpatient    7/20/2014  9:02 AM 9/12/2014  1:02 PM Full Code [id number]  Cesar Lafleur MD Outpatient    7/15/2014  5:49 PM 7/20/2014  9:02 AM Full Code [id number]  Radha Field PA-C Inpatient    2/22/2014  4:21 AM 2/22/2014  2:33 PM Full Code [id number]  Mike Ogden MD ED    1/17/2014  8:18 PM 1/18/2014 12:34 PM Full Code [id number]  Judson Gan RN Inpatient    2/1/2013  2:01 PM 1/17/2014  8:18 PM Full Code [id number]  Nadira Mccann MD Outpatient    2/1/2013 12:18 AM 2/1/2013  2:01 PM Full Code [id number]  Rajinder Dwyer MD Inpatient    1/31/2013  9:39 PM 2/1/2013 12:18 AM Full Code [id number]  Arash Silva MD Outpatient    1/31/2013  7:09 PM 1/31/2013  9:39 PM Full Code [id number]  Radha Field PA-C Inpatient    7/10/2008  2:44 PM 1/31/2013  7:09 PM None None HEALTH CARE DIRECTIVE ON FILE 7-3-08 Melina Art Demographics      Current Code Status     Date Active Code Status Order ID Comments User Context       Prior      Summary of Visit     Reason for your hospital stay       Small bowel obstruction.                Medication Review      START taking        Dose / Directions Comments    famotidine 20 MG tablet   Commonly known as:  PEPCID        Dose:  20 mg   Take 1 tablet (20 mg) by mouth 2 times daily as needed   Quantity:  60 tablet   Refills:  1    For heartburn       hypromellose-dextran Soln ophthalmic solution   Used for:  Dry eyes        Dose:  1 drop   Place 1 drop into both eyes 3 times daily as needed for dry eyes   Refills:  0        LORazepam 0.5 MG tablet   Commonly known as:  ATIVAN   Used for:  Anxiety        Dose:  0.25 mg   Take 0.5 tablets (0.25 mg) by mouth every 8 hours as needed for anxiety   Quantity:  20 tablet   Refills:  0        metoprolol 25 MG tablet   Commonly known as:  LOPRESSOR   Used for:  Atrial flutter, unspecified type (H)        Dose:  12.5 mg   Take 0.5  tablets (12.5 mg) by mouth 2 times daily   Quantity:  60 tablet   Refills:  0          CONTINUE these medications which may have CHANGED, or have new prescriptions. If we are uncertain of the size of tablets/capsules you have at home, strength may be listed as something that might have changed.        Dose / Directions Comments    warfarin 2 MG tablet   Commonly known as:  COUMADIN   This may have changed:    - medication strength  - how much to take  - how to take this  - when to take this  - additional instructions   Used for:  Atrial flutter, unspecified type (H)        1 mg daily for aflut and adjust for desired INR 2.0 to 3.0   Quantity:  30 tablet   Refills:  0          CONTINUE these medications which have NOT CHANGED        Dose / Directions Comments    ACETAMINOPHEN PO        Dose:  650 mg   Take 650 mg by mouth every 4 hours as needed for pain For pain 1-5 out of 10   Refills:  0        AMIODARONE HCL PO        Dose:  200 mg   Take 200 mg by mouth 2 times daily   Refills:  0        calcium acetate 667 MG Caps capsule   Commonly known as:  PHOSLO   Used for:  Chronic kidney disease, unspecified CKD stage        Dose:  667 mg   Take 1 capsule (667 mg) by mouth 3 times daily (with meals)   Quantity:  180 capsule   Refills:  0        cholecalciferol 5000 UNITS Caps   Used for:  CKD (chronic kidney disease) stage 5, GFR less than 15 ml/min (H)        Dose:  5000 Units   Take 1 capsule (5,000 Units) by mouth daily   Refills:  3        fluticasone 50 MCG/ACT spray   Commonly known as:  FLONASE   Used for:  Nasal congestion        Dose:  1 spray   Spray 1 spray into both nostrils daily   Quantity:  1 Bottle   Refills:  0        ipratropium - albuterol 0.5 mg/2.5 mg/3 mL 0.5-2.5 (3) MG/3ML neb solution   Commonly known as:  DUONEB        Dose:  1 vial   Take 1 vial by nebulization 3 times daily   Refills:  0        nitroGLYcerin 0.4 MG sublingual tablet   Commonly known as:  NITROSTAT   Used for:  Hx of non-ST  elevation myocardial infarction (NSTEMI), Atherosclerosis of native coronary artery of native heart without angina pectoris, Postsurgical aortocoronary bypass status        Dose:  0.4 mg   Place 1 tablet (0.4 mg) under the tongue every 5 minutes as needed for chest pain   Quantity:  25 tablet   Refills:  0        nystatin 457397 UNIT/GM Powd   Commonly known as:  MYCOSTATIN        Apply topically 2 times daily as needed   Refills:  0        order for DME   Used for:  ANABEL (obstructive sleep apnea)        Equipment being ordered: cpap machine, mask, humidifier, tubing and filters   Quantity:  1 Device   Refills:  0        pentoxifylline 400 MG CR tablet   Commonly known as:  TRENtal   Used for:  Venous stasis ulcer (H)        Dose:  400 mg   Take 1 tablet (400 mg) by mouth daily   Refills:  0        pramipexole 0.125 MG tablet   Commonly known as:  MIRAPEX   Used for:  Restless leg syndrome        Dose:  0.125 mg   Take 1 tablet (0.125 mg) by mouth 3 times daily   Quantity:  90 tablet   Refills:  3        pravastatin 40 MG tablet   Commonly known as:  PRAVACHOL   Used for:  Hx of non-ST elevation myocardial infarction (NSTEMI), Atherosclerosis of native coronary artery of native heart without angina pectoris, Type 2 diabetes mellitus with other circulatory complications        Dose:  40 mg   Take 1 tablet (40 mg) by mouth daily   Quantity:  90 tablet   Refills:  3          STOP taking     acidophilus tablet           alum & mag hydroxide-simethicone 400-400-40 MG/5ML Susp suspension   Commonly known as:  MYLANTA ES/MAALOX  ES           Calcium Carb-Cholecalciferol 500-400 MG-UNIT Tabs           clonazePAM 0.5 MG tablet   Commonly known as:  klonoPIN           darbepoetin hira 60 MCG/0.3ML injection   Commonly known as:  ARANESP (ALBUMIN FREE)           sodium polystyrene 15 GM/60ML suspension   Commonly known as:  KAYEXALATE           ZOFRAN ODT 4 MG ODT tab   Generic drug:  ondansetron                   After Care      Activity - Up ad beata           Additional Discharge Instructions       Call Dr. Stuart at Pager [phone] if questions, or Cell Phone [phone].       General info for SNF       Length of Stay Estimate: Short Term Care: Estimated # of Days <30  Condition at Discharge: Improving  Level of care:skilled   Rehabilitation Potential: Good  Admission H&P remains valid and up-to-date: Yes  Recent Chemotherapy: N/A  Use Nursing Home Standing Orders: Yes       Glucose monitor nursing POCT       Glucometer check twice daily, call physician if > 200 x 3 consecutive checks.       Mantoux instructions       Give two-step Mantoux (PPD) Per Facility Policy Yes             Referrals     Occupational Therapy Adult Consult       Evaluate and treat as clinically indicated.    Reason:  Assistance with ADL's       Physical Therapy Adult Consult       Evaluate and treat as clinically indicated.    Reason:  Weakness related to deconditioning             Follow-Up Appointment Instructions     Follow Up and recommended labs and tests       Follow up with primary care provider 1 week after discharge from TCU.  Check INR in 2 days, and adjust warfarin dose for desired INR of 2.0 to 3.0.      Follow up with Dr. Rashard Zafar, Vascular Surgery, in 2 weeks.    Continue Dialysis Mon, Wed, Friday as arranged by Nephrology.             Your next 10 appointments already scheduled     Nov 02, 2017  2:00 PM CDT   (Arrive by 1:45 PM)   New Vascular Visit with Cassie Cardenas MD   [facility] ([facility])    [address]  3rd Floor  [facility] [address] [phone]            Dec 05, 2017 12:30 PM CST   LAB with LAB FIRST FLOOR [facility] ([facility])    [id number] [place] [address] [phone]           Please do not eat 10-12 hours before your appointment if you are coming in fasting for labs on lipids, cholesterol, or glucose  "(sugar). This does not apply to pregnant women. Water, hot tea and black coffee (with nothing added) are okay. Do not drink other fluids, diet soda or chew gum.            Dec 05, 2017  1:00 PM CST   Return Visit with Vibha Barfield MD   [facility] ([facility]    [address] [address] [place] [address] [phone]              Statement of Approval     Ordered          10/31/17 1312  I have reviewed and agree with all the recommendations and orders detailed in this document.  EFFECTIVE NOW     Approved and electronically signed by:  Emmanuel Stuart MD                                                 INTERAGENCY TRANSFER FORM - NURSING   10/22/2017                       [facility] DIALYSIS: [phone]            Attending Provider: Cara Fisher MD     Allergies:  Ciprofloxacin, Oxycodone, Lisinopril, Sulfa Drugs, Penicillins    Infection:  None   Service:  HOSPITALIST    Ht:  1.626 m (5' 4\")   Wt:  70.4 kg (155 lb 3.3 oz)   Admission Wt:  65.8 kg (145 lb)    BMI:  26.64 kg/m 2   BSA:  1.78 m 2            Advance Directives        Does patient have a scanned Advance Directive/ACP document in EPIC?           Yes        Immunizations     Name Date      HEPA 10/13/98     HEPA 03/11/98     HepB 09/25/00     HepB 10/19/99     HepB 08/10/99     Influenza (High Dose) 3 valent vaccine 09/21/17     Influenza (High Dose) 3 valent vaccine 09/13/16     Influenza (High Dose) 3 valent vaccine 09/23/15     Influenza (High Dose) 3 valent vaccine 09/24/14     Influenza (High Dose) 3 valent vaccine 12/06/13     Influenza (High Dose) 3 valent vaccine 09/25/12     Influenza (IIV3) 10/01/10     Influenza (IIV3) 10/21/09     Influenza (IIV3) 12/03/08     Influenza (IIV3) 12/06/07     Influenza (IIV3) 04/02/07     Influenza (IIV3) 11/29/06     Influenza (IIV3) 11/03/05     Influenza (IIV3) 10/29/04     Influenza (IIV3) 11/10/03     Influenza (IIV3) 10/16/02  "    Influenza (IIV3) 11/16/01     Meningococcal (Menomune ) 04/02/07     Pneumococcal (PCV 13) 09/13/16     Pneumococcal 23 valent 11/17/14     Pneumococcal 23 valent 12/03/08     Poliovirus, inactivated (IPV) 04/02/07     TD (ADULT, 7+) 10/31/10     TD (ADULT, 7+) 08/07/00     TD (ADULT, 7+) 06/20/96     Typhoid IM 04/02/07     Yellow Fever 04/02/07     Zoster vaccine, live 11/01/11       ASSESSMENT     Discharge Profile Flowsheet     EXPECTED DISCHARGE     COMMUNICATION ASSESSMENT      Expected Discharge Date  10/31/17 (TCU) 10/30/17 1538   Patient's communication style  spoken language (English or Bilingual) 10/22/17 0245    DISCHARGE NEEDS ASSESSMENT     FINAL RESOURCES      Concerns To Be Addressed  compliance issue concerns 10/23/17 1209   Resources List  Home Care 10/23/17 1209    Concerns Comments  ? new HD, INR next check 10/23/17 1209   Other Resources  Home Care 10/23/17 1209    Equipment Currently Used at Home  walker, rolling;walker, standard;other (see comments);dressing device;raised toilet;grab bar;shower chair (Scooter, toilet safety frame, reacher) 10/26/17 1510   PAS Number  [id number] 10/23/17 1209    Transportation Available  car;family or friend will provide 10/26/17 1522   Senior Linkage Line Referral Placed  07/02/16 10/23/17 1209    # of Referrals Placed by CTS  Post Acute Facilities 10/23/17 1155   F/U Appointment Brochure Provided  07/02/16 10/23/17 1209    Key Recommendations  TBD after surgery 10/06/16 1312   Existing Resources/Services  Home Care 11/18/14 1451    Does Patient Need a Referral for Clinic CC  No 03/24/15 1445   SKIN      Equipment Used at Home  walker, rolling 07/15/14 1946   Inspection of bony prominences  Full except (identify areas not inspected) 11/01/17 1011    GASTROINTESTINAL (ADULT,PEDIATRIC,OB)     Inspection under devices  Full 11/01/17 1011    GI WDL  WDL 11/01/17 0950   Skin WDL  ex 11/01/17 1011    Abdominal Appearance  rounded 10/31/17 1741   Skin  "Color/Characteristics  kailash 11/01/17 1011    All Quadrants Bowel Sounds  audible and active in all quadrants 11/01/17 1011   Skin Temperature  warm 11/01/17 1011    LUQ Bowel Sounds  audible and active in all quadrants 10/28/17 0953   Skin Moisture  dry 11/01/17 1011    RUQ Bowel Sounds  audible and active in all quadrants 10/28/17 0953   Skin Integrity  wound(s);scab(s) 11/01/17 1011    LLQ Bowel Sounds  audible and active in all quadrants 10/28/17 0953   Procedural focused assessment (identify areas inspected)   Abdomen 11/01/17 1011    RLQ Bowel Sounds  audible and active in all quadrants 10/28/17 0953   Skin Elasticity  quick return to original state 10/31/17 1741    All Quadrants Palpation  soft/nontender 10/27/17 1313   Full except areas not inspected   Sacrum;Buttock, left;Buttock, right 11/01/17 1011    Last Bowel Movement  10/31/17 10/31/17 1741   SAFETY      GI Signs/Symptoms  constipation;passing flatus 10/29/17 1036   Safety WDL  WDL 11/01/17 0950    Passing flatus  yes 11/01/17 1011   All Alarms  alarm(s) activated and audible 11/01/17 0950                 Assessment WDL (Within Defined Limits) Definitions           Safety WDL     Effective: 09/28/15    Row Information: <b>WDL Definition:</b> Bed in low position, wheels locked; call light in reach; upper side rails up x 2; ID band on<br> <font color=\"gray\"><i>Item=AS safety wdl>>List=AS safety wdl>>Version=F14</i></font>      Skin WDL     Effective: 09/28/15    Row Information: <b>WDL Definition:</b> Warm; dry; intact; elastic; without discoloration; pressure points without redness<br> <font color=\"gray\"><i>Item=AS skin wdl>>List=AS skin wdl>>Version=F14</i></font>      Vitals     Vital Signs Flowsheet     VITAL SIGNS     Side Effects Monitoring: Sedation Level  1 10/31/17 1517    Temp  97.7  F (36.5  C) 11/01/17 0950   HEIGHT AND WEIGHT      Temp src  Oral 11/01/17 0950   Height  1.626 m (5' 4\") 10/24/17 2018    Resp  16 11/01/17 0950   Height Method  " "Stated 10/22/17 0248    Pulse  78 10/31/17 1906   Weight  70.4 kg (155 lb 3.3 oz) 10/30/17 1125    Heart Rate  70 11/01/17 1103   Weight Method  Standing scale 10/30/17 0729    Pulse/Heart Rate Source  Monitor 11/01/17 0818   Bed Scale  Standard (fitted sheet, draw sheet/ pad, cover/flat sheet, blanket, two pillows) 10/25/17 0729    BP  150/85 11/01/17 1103   BRIANNE COMA SCALE      BP Location  Left arm 11/01/17 0818   Best Eye Response  4-->(E4) spontaneous 10/22/17 0337    OXYGEN THERAPY     Best Motor Response  6-->(M6) obeys commands 10/22/17 0337    SpO2  95 % 11/01/17 0950   Best Verbal Response  5-->(V5) oriented 10/22/17 0337    O2 Device  None (Room air) 11/01/17 0950   Brianne Coma Scale Score  15 10/22/17 0337    Oxygen Delivery  1 LPM 10/28/17 0731   POSITIONING      PAIN/COMFORT     Body Position  independently positioning 11/01/17 1011    Patient Currently in Pain  denies 11/01/17 0821   Head of Bed (HOB)  HOB at 30 degrees 11/01/17 0518    Preferred Pain Scale  number (Numeric Rating Pain Scale) 10/24/17 1351   Positioning/Transfer Devices  pillows;in use 11/01/17 0518    Patient's Stated Pain Goal  1 (i WANT AS LITTLE PAIN AS POSSIBLE, PLEASE USE POST-OP MEDS GENEROUSLY\") 10/24/17 1351   Chair  Upright in chair 11/01/17 1011    0-10 Pain Scale  0 10/25/17 1307   DAILY CARE      FACES Pain Rating  2-->hurts little bit 10/22/17 0934   Activity Management  activity encouraged 11/01/17 1011    Pain Location  Abdomen 10/24/17 2219   Activity Assistance Provided  assistance, 1 person 11/01/17 1011    Pain Orientation  Lower 10/24/17 2018   Assistive Device Utilized  walker 11/01/17 0518    Pain Descriptors  Cramping 10/24/17 2018   Additional Documentation  Activity Device Assistance (Row) 10/22/17 1628    Pain Intervention(s)  Declines 10/31/17 0321   ECG      Response to Interventions  Absence of nonverbal indicators of pain 10/30/17 0447   Equipment  electrodes changed;telemetry batteries changed " 10/24/17 1126    ANALGESIA SIDE EFFECTS MONITORING     ECG Rhythm  Normal sinus rhythm 11/01/17 0950    Side Effects Monitoring: Respiratory Quality  R 10/31/17 1517   Ectopy  None 10/24/17 1853    Side Effects Monitoring: Respiratory Depth  N 10/31/17 1517   Lead Monitored  Lead II 10/24/17 1853            Patient Lines/Drains/Airways Status    Active LINES/DRAINS/AIRWAYS     Name: Placement date: Placement time: Site: Days: Last dressing change:    Peripheral IV 10/28/17 Left;Posterior Lower forearm 10/28/17   0424   Lower forearm   4     Pressure Injury 10/13/17 Coccyx Blanchable redness, intact 10/13/17   1500    18     Wound 04/28/17 Lateral;Left Hip Surgical from previous surgery 04/28/17   1542   Hip   186     Wound 04/28/17 Lower;Left Leg Other (comment) scab dime size LLE 04/28/17   1630   Leg   186     Wound 05/31/17 Left Calf Other (comment) open blisters  05/31/17 2011   Calf   153     Wound 06/12/17 Posterior;Right Calf Ulceration 06/12/17   2100   Calf   141     Wound 06/21/17 Left Calf Abrasion(s) 06/21/17   1531   Calf   132     Wound 07/06/17 Anterior Leg Abrasion(s) quarter sized, weaping 07/06/17   2028   Leg   117     Wound 08/12/17 Bilateral Leg Ulceration;Other (comment)  scattered open ulcerations on lower legs bilateral posterior and anterior  08/12/17   1901   Leg   80     Wound 08/12/17 Bilateral Labia excoration, skin intact 08/12/17   1600   Labia   80     Rash 05/31/17 2013 Left breast patch 05/31/17   2013    153     Incision/Surgical Site 10/24/17 Abdomen 10/24/17   1809    7             Patient Lines/Drains/Airways Status    Active PICC/CVC     Name: Placement date: Placement time: Site: Days: Additional Info Last dressing change:    CVC Double Lumen Right           Description : Tunneled            Orientation: Right               Intake/Output Detail Report     Date Intake           Output         Net    Shift P.O. I.V. Other NG/GT IV Piggyback Colloid Total Urine Emesis/NG output  Other Stool Blood Total       Noc 10/30/17 2300 - 10/31/17 0659 100 430 -- -- -- -- 530 -- -- -- -- -- -- 530    Day 10/31/17 0700 - 10/31/17 1459 -- 85.4 -- -- -- -- 85.4 175 -- -- 600 -- 775 -689.6    Janki 10/31/17 1500 - 10/31/17 2259 240 -- -- -- -- -- 240 -- -- 350 -- -- 350 -110    Noc 10/31/17 2300 - 11/01/17 0659 -- -- -- -- -- -- -- 100 -- -- -- -- 100 -100    Day 11/01/17 0700 - 11/01/17 1459 -- -- -- -- -- -- -- -- -- -- -- -- -- 0      Last Void/BM       Most Recent Value    Urine Occurrence 1 at 10/31/2017 0428    Stool Occurrence 1 at 10/31/2017 0850      Case Management/Discharge Planning     Case Management/Discharge Planning Flowsheet     REFERRAL INFORMATION     DISCHARGE PLANNING      Did the Initial Social Work Assessment result in a Social Work Case?  Yes 10/23/17 1156   Transportation Available  car;family or friend will provide 10/26/17 1522    Admission Type  inpatient 10/23/17 1156   Key Recommendations  TBD after surgery 10/06/16 1312    Arrived From  home or self-care;home healthcare service 06/13/17 1417   Does Patient Need a Referral for Clinic CC  No 03/24/15 1445    # of Referrals Placed by CTS  Post Acute Facilities 10/23/17 1155   Equipment Used at Home  walker, rolling 07/15/14 1946    Post Acute Facilities  TCU 10/23/17 1156   FINAL RESOURCES      Reason For Consult  discharge planning 10/23/17 1156   Equipment Currently Used at Home  walker, rolling;walker, standard;other (see comments);dressing device;raised toilet;grab bar;shower chair (Scooter, toilet safety frame, reacher) 10/26/17 1510    CTS Assigned to Case  dagoberto schafer 10/23/17 1156   Resources List  Home Care 10/23/17 1209    Primary Care Clinic Name  [facility] 06/01/17 1318   Other Resources  Home Care 10/23/17 1209    Primary Care MD Name  Parviz 06/01/17 1318   PAS Number  [id number] 10/23/17 1209    LIVING ENVIRONMENT     Senior Linkage Line Referral Placed  07/02/16 10/23/17 1209    Lives With   friend(s);spouse;grandchild(zoe) 10/26/17 1510   F/U Appointment Brochure Provided  07/02/16 10/23/17 1209    Living Arrangements  house 10/26/17 1510   Existing Resources/Services  Home Care 11/18/14 1451    COPING/STRESS     ABUSE RISK SCREEN      Major Change/Loss/Stressor  illness;hospitalization 10/22/17 1056   QUESTION TO PATIENT:  Has a member of your family or a partner(now or in the past) intimidated, hurt, manipulated, or controlled you in any way?  no 10/22/17 0249    Patient Personal Strengths  motivated;resilient;expressive of emotions;expressive of needs 06/13/17 1419   QUESTION TO PATIENT: Do you feel safe going back to the place where you are living?  yes 10/22/17 0249    EXPECTED DISCHARGE     OBSERVATION: Is there reason to believe there has been maltreatment of a vulnerable adult (ie. Physical/Sexual/Emotional abuse, self neglect, lack of adequate food, shelter, medical care, or financial exploitation)?  no 10/22/17 0249    Expected Discharge Date  10/31/17 (TCU) 10/30/17 1538   (R) MENTAL HEALTH SUICIDE RISK      ASSESSMENT/CONCERNS TO BE ADDRESSED     Are you depressed or being treated for depression?  Yes 10/22/17 1056    Concerns To Be Addressed  compliance issue concerns 10/23/17 1209   HOMICIDE RISK      Concerns Comments  ? new HD, INR next check 10/23/17 1209   Feels Like Hurting Others  no 10/22/17 0249                  [facility] DIALYSIS: [phone]            Medication Administration Report for Kathryn Banks as of 11/01/17 1117   Legend:    Given Hold Not Given Due Canceled Entry Other Actions    Time Time (Time) Time  Time-Action       Inactive    Active    Linked        Medications 10/26/17 10/27/17 10/28/17 10/29/17 10/30/17 10/31/17 11/01/17    - MEDICATION INSTRUCTIONS for Dialysis Patients -  Freq: SEE ADMIN INSTRUCTIONS Route: XX  Start: 10/22/17 1419   Admin Instructions: Do not give any medication that may affect blood pressure or volume before  dialysis.       The following medications may be removed by dialysis and should be given after dialysis: METOPROLOL               0.9% sodium chloride BOLUS  Dose: 100 mL Freq: EVERY 5 MIN PRN Route: IV  PRN Comment: see admin instructions  Start: 11/01/17 0721   Admin Instructions: FOR Systolic Blood Pressure less than 90 mmHg or for volume replacement during dialysis DURING DIALYSIS RUN               albumin human 25 % injection 50 mL  Dose: 50 mL Freq: EVERY 1 HOUR PRN Route: IV  PRN Reason: other  PRN Comment: see admin instructions  Start: 11/01/17 0721   Admin Instructions: FOR Systolic Blood Pressure less than 90 mmHg or for volume replacement during dialysis DURING DIALYSIS RUN               amiodarone (PACERONE/CODARONE) tablet 200 mg  Dose: 200 mg Freq: 2 TIMES DAILY Route: PO  Start: 10/22/17 1245   Admin Instructions: Ok to give Po amiodarone and clamp the NG  Avoid grapefruit juice during oral amiodarone treatment.     0823 (200 mg)-Given       2152 (200 mg)-Given        0824 (200 mg)-Given       2113 (200 mg)-Given        0927 (200 mg)-Given       2056 (200 mg)-Given        0855 (200 mg)-Given       2128 (200 mg)-Given        0943 (200 mg)-Given       2125 (200 mg)-Given        0850 (200 mg)-Given       2228 (200 mg)-Given        0823 (200 mg)-Given       [ ] 2100           benzocaine-menthol (CHLORASEPTIC) 6-10 MG lozenge 1-2 lozenge  Dose: 1-2 lozenge Freq: EVERY 1 HOUR PRN Route: BU  PRN Reason: sore throat  PRN Comment: dry/sore throat without fever  Start: 10/23/17 1337              bisacodyl (DULCOLAX) Suppository 10 mg  Dose: 10 mg Freq: DAILY PRN Route: RE  PRN Reason: constipation  Start: 10/28/17 0812      1048 (10 mg)-Given        1042 (10 mg)-Given              epoetin hira (EPOGEN/PROCRIT) injection 4,000 Units  Dose: 4,000 Units Freq: SEE ADMIN INSTRUCTIONS Route: IV  Start: 11/01/17 0721   Admin Instructions: Given during each dialysis (per request)  For ordered doses up to 500 units/kg,  give IV Push undiluted over 1 minute.           0955 (4,000 Units)-Given           fluticasone (FLONASE) 50 MCG/ACT spray 1 spray  Dose: 1 spray Freq: DAILY Route: BOTH NOSTRIL  Start: 10/22/17 1130    1013 (1 spray)-Given        0819 (1 spray)-Given        0928 (1 spray)-Given        0901 (1 spray)-Given        0945 (1 spray)-Given        0853 (1 spray)-Given        0824 (1 spray)-Given           glucose 40 % gel 15-30 g  Dose: 15-30 g Freq: EVERY 15 MIN PRN Route: PO  PRN Reason: low blood sugar  Start: 10/22/17 1607   Admin Instructions: Give 15 g for BG 51 to 69 mg/dL IF patient is conscious and able to swallow. Give 30 g for BG less than or equal to 50 mg/dL IF patient is conscious and able to swallow. Do NOT give glucose gel via enteral tube.  IF patient has enteral tube: give apple juice 120 mL (4 oz or 15 g of CHO) via enteral tube for BG 51 to 69 mg/dL.  Give apple juice 240 mL (8 oz or 30 g of CHO) via enteral tube for BG less than or equal to 50 mg/dL.    ~Oral gel is preferable for conscious and able to swallow patient.   ~IF gel unavailable or patient refuses may provide apple juice 120 mL (4 oz or 15 g of CHO). Document juice on I and O flowsheet.              Or  dextrose 50 % injection 25-50 mL  Dose: 25-50 mL Freq: EVERY 15 MIN PRN Route: IV  PRN Reason: low blood sugar  Start: 10/22/17 1607   Admin Instructions: Use if have IV access, BG less than 70 mg/dL and meet dose criteria below:  Dose if conscious and alert (or disorientated) and NPO = 25 mL  Dose if unconscious / not alert = 50 mL  Vesicant. Up to 25g may be given IV Push undiluted. Each 5g over 1 minute.              Or  glucagon injection 1 mg  Dose: 1 mg Freq: EVERY 15 MIN PRN Route: SC  PRN Reason: low blood sugar  PRN Comment: May repeat x 1 only  Start: 10/22/17 1607   Admin Instructions: May give SQ or IM. ONLY use glucagon IF patient has NO IV access AND is UNABLE to swallow AND blood glucose is LESS than or EQUAL to 50 mg/dL.                heparin (porcine) injection 500 Units  Dose: 500 Units Freq: ONCE IN DIALYSIS Route: HM Machine  Start: 11/01/17 0730   Admin Instructions: LOADING DOSE  Administer loading dose prior to heparin infusion.   PRE DIALYSIS RUN   Dialysis           [ ] 0730           heparin 10,000 units/10 mL infusion (DIALYSIS USE)  Rate: 0.5 mL/hr Freq: CONTINUOUS Route: HM Machine  Start: 11/01/17 0730   Admin Instructions: DURING DIALYSIS TREATMENT           [ ] 0730           heparin 1000 unit/mL DIALYSIS Cath Care  Dose: 3 mL Freq: ONCE PRN Route: IV  PRN Comment: To BLUE lumen for dialysis catheter care POST RUN  Start: 11/01/17 0721   Admin Instructions: Administer volume specific to catheter lumen/catheter length (dose above is the size of vial or syringe dispensed, not volume to administer). Administer POST DIALYSIS RUN.               heparin 1000 unit/mL DIALYSIS Cath Care  Dose: 3 mL Freq: ONCE PRN Route: IV  PRN Comment: To RED lumen for dialysis catheter care POST RUN  Start: 11/01/17 0721   Admin Instructions: Administer volume specific to catheter lumen/catheter length (dose above is the size of vial or syringe dispensed, not volume to administer). Administer POST DIALYSIS RUN.               HYDROmorphone (PF) (DILAUDID) injection 0.2 mg  Dose: 0.2 mg Freq: EVERY 1 HOUR PRN Route: IV  PRN Reason: severe pain  Start: 10/24/17 2011   Admin Instructions: Give IV Push undiluted up to 4 mg. Each 2mg over 2-5 minutes.               hypromellose-dextran (ARTIFICAL TEARS) ophthalmic solution 1 drop  Dose: 1 drop Freq: 3 TIMES DAILY PRN Route: Both Eyes  PRN Reason: dry eyes  Start: 10/28/17 1209        0157 (1 drop)-Given             insulin aspart (NovoLOG) inj (RAPID ACTING)  Dose: 1-5 Units Freq: AT BEDTIME Route: SC  Start: 10/29/17 2200   Admin Instructions: MEDIUM INSULIN RESISTANCE DOSING    Do Not give Bedtime Correction Insulin if BG less than  200.   For  - 249 give 1 units.   For  - 299 give 2  units.   For  - 349 give 3 units.   For  -399 give 4 units.   For BG greater than or equal to 400 give 5 units.  Notify provider if glucose greater than or equal to 350 mg/dL after administration of correction dose.  If given at mealtime, must be administered 5 min before meal or immediately after.        (2132)-Not Given        (2118)-Not Given [C]        (2315)-Not Given        [ ] 2200           insulin aspart (NovoLOG) inj (RAPID ACTING)  Dose: 1-7 Units Freq: 3 TIMES DAILY BEFORE MEALS Route: SC  Start: 10/29/17 1830   Admin Instructions: Correction Scale - MEDIUM INSULIN RESISTANCE DOSING     Do Not give Correction Insulin if Pre-Meal BG less than 140.   For Pre-Meal  - 189 give 1 unit.   For Pre-Meal  - 239 give 2 units.   For Pre-Meal  - 289 give 3 units.   For Pre-Meal  - 339 give 4 units.   For Pre-Meal - 399 give 5 units.   For Pre-Meal -449 give 6 units  For Pre-Meal BG greater than or equal to 450 give 7 units.   To be given with prandial insulin, and based on pre-meal blood glucose.    Notify provider if glucose greater than or equal to 350 mg/dL after administration of correction dose.  If given at mealtime, must be administered 5 min before meal or immediately after.                (0800)-Not Given [C]       (1149)-Not Given [C]       (1726)-Not Given        (0720)-Not Given [C]       (1138)-Not Given [C]       1809 (1 Units)-Given        (0823)-Not Given       [ ] 1200       [ ] 1700           ipratropium - albuterol 0.5 mg/2.5 mg/3 mL (DUONEB) neb solution 3 mL  Dose: 1 vial Freq: EVERY 4 HOURS PRN Route: NEBULIZATION  PRN Reasons: wheezing,shortness of breath / dyspnea  Start: 10/23/17 1400              lidocaine 1 % 1 mL  Dose: 1 mL Freq: EVERY 1 HOUR PRN Route: OTHER  PRN Comment: mild pain with VAD insertion or accessing implanted port  Start: 10/24/17 1306   Admin Instructions: Do NOT give if patient has a history of allergy to any local  "anesthetic or any \"rah\" product. MAX dose 1 mL subcutaneous OR intradermal in divided doses.               LORazepam (ATIVAN) injection 0.25 mg  Dose: 0.25 mg Freq: DAILY PRN Route: IV  PRN Reason: anxiety  PRN Comment: Before dialysis.  Start: 10/23/17 1358   Admin Instructions: For IV PUSH: Dilute with equal volume of NS. Up to 4 mg may be given IV Push. Each 2mg over 1-5 minutes.     0113 (0.25 mg)-Given       2208 (0.25 mg)-Given        1252 (0.25 mg)-Given          1145 (0.25 mg)-Given         0913 (0.25 mg)-Given           melatonin sublingual tablet 5 mg  Dose: 5 mg Freq: AT BEDTIME PRN Route: SL  PRN Reason: sleep  Start: 10/25/17 0739      2222 (5 mg)-Given        2131 (5 mg)-Given        2139 (5 mg)-Given        2231 (5 mg)-Given            metoclopramide (REGLAN) injection 5 mg  Dose: 5 mg Freq: EVERY 8 HOURS Route: IV  Start: 10/27/17 0800   Admin Instructions: Avoid use if patient has full bowel obstruction or perforation.  Avoid use if patient has full bowel obstruction or perforation. Irritant. Give IV Push undiluted over 2 minutes up to 10mg.      0824 (5 mg)-Given       1638 (5 mg)-Given        0024 (5 mg)-Given       0803 (5 mg)-Given       1534 (5 mg)-Given        0059 (5 mg)-Given       0855 (5 mg)-Given       1823 (5 mg)-Given       2357 (5 mg)-Given        (0944)-Not Given [C]       1559 (5 mg)-Given        0129 (5 mg)-Given       (0853)-Not Given [C]       (1638)-Not Given       (2326)-Not Given        (0828)-Not Given [C]       [ ] 1600           metoprolol (LOPRESSOR) half-tab 12.5 mg  Dose: 12.5 mg Freq: 2 TIMES DAILY Route: PO  Start: 10/29/17 0900   Admin Instructions: Hold for SBP < 100 or HR < 60        0854 (12.5 mg)-Given       2128 (12.5 mg)-Given        (0948)-Not Given [C]       2125 (12.5 mg)-Given        0850 (12.5 mg)-Given       2228 (12.5 mg)-Given        0829-Hold [C]       [ ] 2100           miconazole (MICATIN; MICRO GUARD) 2 % powder  Freq: 2 TIMES DAILY Route: " Top  Start: 10/22/17 1200    (0400)-Not Given       1013 ( )-Given       (2236)-Not Given        (0818)-Not Given       (2209)-Not Given        (0900)-Not Given [C]       2101 ( )-Given        1039 ( )-Given       2132 ( )-Given        (0947)-Not Given [C]       (2128)-Not Given        (0853)-Not Given       (2316)-Not Given        (0828)-Not Given       [ ] 2100           naloxone (NARCAN) injection 0.1-0.4 mg  Dose: 0.1-0.4 mg Freq: EVERY 2 MIN PRN Route: IV  PRN Reason: opioid reversal  Start: 10/22/17 1125   Admin Instructions: For respiratory rate LESS than or EQUAL to 8.  Partial reversal dose:  0.1 mg titrated q 2 minutes for Analgesia Side Effects Monitoring Sedation Level of 3 (frequently drowsy, arousable, drifts to sleep during conversation).Full reversal dose:  0.4 mg bolus for Analgesia Side Effects Monitoring Sedation Level of 4 (somnolent, minimal or no response to stimulation).  Up to 2mg may be given IV Push undiluted each 0.4mg over 15 seconds in emergency situations. For non-emergent situations further dilute in 9mL of NS to facilitate titration of response.               nitroGLYcerin (NITROSTAT) sublingual tablet 0.4 mg  Dose: 0.4 mg Freq: EVERY 5 MIN PRN Route: SL  PRN Reason: chest pain  Start: 10/22/17 1125              ondansetron (ZOFRAN-ODT) ODT tab 4 mg  Dose: 4 mg Freq: EVERY 6 HOURS PRN Route: PO  PRN Reasons: nausea,vomiting  Start: 10/22/17 1125   Admin Instructions: This is Step 1 of nausea and vomiting management.  If nausea not resolved in 15 minutes, go to Step 2 prochlorperazine (COMPAZINE). Do not push through foil backing. Peel back foil and gently remove. Place on tongue immediately. Administration with liquid unnecessary              Or  ondansetron (ZOFRAN) injection 4 mg  Dose: 4 mg Freq: EVERY 6 HOURS PRN Route: IV  PRN Reasons: nausea,vomiting  Start: 10/22/17 1125   Admin Instructions: This is Step 1 of nausea and vomiting management.  If nausea not resolved in 15  minutes, go to Step 2 prochlorperazine (COMPAZINE).  Irritant. Up to 4 mg may be given IV Push undiluted over 2-5 minutes.               pantoprazole (PROTONIX) EC tablet 40 mg  Dose: 40 mg Freq: EVERY MORNING Route: PO  Start: 10/30/17 0900   Admin Instructions: DO NOT CRUSH.         0943 (40 mg)-Given        0850 (40 mg)-Given        0823 (40 mg)-Given           pramipexole (MIRAPEX) tablet 0.125 mg  Dose: 0.125 mg Freq: 3 TIMES DAILY Route: PO  Start: 10/27/17 2245      0023 (0.125 mg)-Given       0927 (0.125 mg)-Given       1535 (0.125 mg)-Given       2221 (0.125 mg)-Given        0854 (0.125 mg)-Given       1822 (0.125 mg)-Given       2128 (0.125 mg)-Given        0943 (0.125 mg)-Given       1558 (0.125 mg)-Given       2125 (0.125 mg)-Given        0850 (0.125 mg)-Given       (1641)-Not Given [C]       2228 (0.125 mg)-Given       2241 (0.125 mg)-Given [C]        0823 (0.125 mg)-Given       [ ] 1600       [ ] 2200           prochlorperazine (COMPAZINE) injection 5 mg  Dose: 5 mg Freq: EVERY 6 HOURS PRN Route: IV  PRN Reasons: nausea,vomiting  Start: 10/22/17 1125   Admin Instructions: This is Step 2 of nausea and vomiting management.   If nausea not resolved in 15 minutes, give metoclopramide (REGLAN) if ordered (step 3 of nausea and vomiting management)  Up to 10 mg may be given IV Push undiluted. Each 5mg over 1 minute.              Or  prochlorperazine (COMPAZINE) tablet 5 mg  Dose: 5 mg Freq: EVERY 6 HOURS PRN Route: PO  PRN Reason: vomiting  Start: 10/22/17 1125   Admin Instructions: This is Step 2 of nausea and vomiting management.   If nausea not resolved in 15 minutes, give metoclopramide (REGLAN) if ordered (step 3 of nausea and vomiting management)              Or  prochlorperazine (COMPAZINE) Suppository 12.5 mg  Dose: 12.5 mg Freq: EVERY 12 HOURS PRN Route: RE  PRN Reasons: nausea,vomiting  Start: 10/22/17 1125   Admin Instructions: This is Step 2 of nausea and vomiting management.   If nausea not  resolved in 15 minutes, give metoclopramide (REGLAN) if ordered (step 3 of nausea and vomiting management)               Warfarin Therapy Reminder (Check START DATE - warfarin may be starting in the FUTURE)  Freq: CONTINUOUS PRN Route: XX  Start: 10/29/17 0800   Admin Instructions: * Note to reorder warfarin (COUMADIN) daily*  PROVIDER is managing warfarin dosing  Patient is on Warfarin Therapy - check for daily order              Completed Medications  Medications 10/26/17 10/27/17 10/28/17 10/29/17 10/30/17 10/31/17 11/01/17         Dose: 250-1,000 mL Freq: ONCE IN DIALYSIS Route: IV  Start: 11/01/17 0730   End: 11/01/17 0955   Admin Instructions: For patient prime during dialysis           0955 (250 mL)-New Bag             Dose: 250 mL Freq: ONCE Route: HM Machine  Start: 11/01/17 0730   End: 11/01/17 0955   Admin Instructions: For Dialyzer Prime. (In Dialyzer)           0955 (250 mL)-New Bag             Dose: 250-1,000 mL Freq: ONCE IN DIALYSIS Route: IV  Start: 10/30/17 0915   End: 10/30/17 1231   Admin Instructions: For patient prime during dialysis         1231 (400 mL)-New Bag               Dose: 250 mL Freq: ONCE Route: HM Machine  Start: 10/30/17 0915   End: 10/30/17 1229   Admin Instructions: For Dialyzer Prime. (In Dialyzer)         1229 (250 mL)-New Bag               Dose: 4,000 Units Freq: ONCE Route: IV  Start: 10/30/17 0915   End: 10/30/17 1232   Admin Instructions: Given during each dialysis (per request)  Give IV Push undiluted over 1 minute up to 500 units/kg.         1232 (4,000 Units)-Given               Dose: 1 mg Freq: ONCE AT 6PM Route: PO  Start: 10/31/17 1800   End: 10/31/17 1944         1944 (1 mg)-Given           Discontinued Medications  Medications 10/26/17 10/27/17 10/28/17 10/29/17 10/30/17 10/31/17 11/01/17         Dose: 100 mL Freq: EVERY 5 MIN PRN Route: IV  PRN Comment: see admin instructions  Start: 10/30/17 0914   End: 10/30/17 1511   Admin Instructions: FOR Systolic Blood  Pressure less than 90 mmHg or for volume replacement during dialysis DURING DIALYSIS RUN         1511-Med Discontinued           Rate: 10 mL/hr Freq: CONTINUOUS Route: IV  Start: 10/24/17 1315   End: 10/31/17 1046   Admin Instructions: IF patient on dialysis.          1046-Med Discontinued          Rate: 50 mL/hr Freq: CONTINUOUS Route: IV  Start: 10/25/17 1045   End: 10/31/17 1046    0051 ( )-New Bag       1238 ( )-New Bag        0004 ( )-New Bag       1252 ( )-New Bag       1631 ( )-Rate/Dose Change        0632 ( )-New Bag        0412 ( )-New Bag       2356 ( )-New Bag        0943 ( )-Rate/Dose Verify       2022 ( )-New Bag        1046-Med Discontinued          Freq: HOLD Route: XX  Start: 10/22/17 1153   End: 10/30/17 1121   Admin Instructions: Hold warfarin (COUMADIN)         1121-Med Discontinued           Dose: 0.3-0.5 mg Freq: EVERY 2 HOURS PRN Route: IV  PRN Reasons: moderate to severe pain,severe pain  Start: 10/22/17 1125   End: 10/30/17 1126   Admin Instructions: Hold while on PCA.         1126-Med Discontinued           Dose: 1-6 Units Freq: EVERY 4 HOURS Route: SC  Start: 10/22/17 1615   End: 10/29/17 1828   Admin Instructions: Correction Scale - MEDIUM INSULIN RESISTANCE DOSING     Do Not give Correction Insulin if BG less than 140.  For  - 189 give 1 unit.  For  - 239 give 2 units.  For  - 289 give 3 units.  For  - 339 give 4 units.  For  - 399 give 5 units.  For BG greater than or equal to 400 give 6 units.  Check blood glucose Q4H and administer based on blood glucose.  Notify provider if glucose greater than or equal to 350 mg/dL after administration of correction dose.  If given at mealtime, must be administered 5 min before meal or immediately after.     (0034)-Not Given       (0651)-Not Given       (0925)-Not Given       1148 (1 Units)-Given       (1607)-Not Given       (2134)-Not Given [C]        (0013)-Not Given       (0420)-Not Given       (0817)-Not Given        (1432)-Not Given       1640 (1 Units)-Given       (2209)-Not Given [C]        (0031)-Not Given       (0510)-Not Given       (0810)-Not Given [C]       (1215)-Not Given [C]       (1720)-Not Given       (2101)-Not Given        (0105)-Not Given       (0417)-Not Given       (0751)-Not Given [C]       (1213)-Not Given [C]       1824 (1 Units)-Given [C]       1828-Med Discontinued                  Freq: CONTINUOUS PRN Route: XX  Start: 10/30/17 0914   End: 10/30/17 1511   Admin Instructions: No heparin during dialysis.         1511-Med Discontinued           Dose: 2.5 mg Freq: ONCE AT 6PM Route: PO  Start: 10/29/17 1800   End: 10/29/17 1535       1535-Med Discontinued            Dose: 1 each Freq: NO DOSE TODAY (WARFARIN) Route: XX  Start: 10/30/17 1657   End: 10/30/17 2356   Admin Instructions: No dose of Warfarin due today per order         2356-Med Discontinued      Medications 10/26/17 10/27/17 10/28/17 10/29/17 10/30/17 10/31/17 11/01/17               INTERAGENCY TRANSFER FORM - NOTES (H&P, Discharge Summary, Consults, Procedures, Therapies)   10/22/2017                       [facility] DIALYSIS: [phone]            History & Physicals     No notes of this type exist for this encounter.         Discharge Summaries      Discharge Summaries by Emmanuel Cordova MD at 10/31/2017  1:14 PM     Author:  Emmanuel Cordova MD Service:  Hospitalist Author Type:  Physician    Filed:  10/31/2017  1:27 PM Date of Service:  10/31/2017  1:14 PM Creation Time:  10/29/2017  7:49 AM    Status:  Signed :  Emmanuel Cordova MD (Physician)         [facility]    Discharge Summary  Hospitalist    Date of Admission:  10/22/2017  Date of Discharge:[JM1.1]  10/31/2017[JM1.2]  Discharging Provider:[JM1.1] Emmanuel Cordova[JM1.2], MD    Discharge Diagnoses[JM1.1]   Principal Problem:    SBO -- S/P Lysis of Adhes 10/24/17  Active Problems:    ANABEL on CPAP    Esophageal  reflux    CAD - NSTEMI, CABG x 5 in , angio 2013 patent grafts except occluded graft to PL    CRF (chronic renal failure), stage 4 (severe) (H)    Essential hypertension with goal blood pressure less than 140/90    Acute on chronic renal failure -- Dialysis started 10/2017    Anemia in chronic kidney disease    Paroxysmal atrial fibrillation -- on Warfarin    Postoperative ileus (H)    DM type 2 -- Hgb A1C 6.6 on 10/13/17    Restless leg syndrome       History of Present Illness[JM1.2]   77-year-old female with a recent prolonged hospitalization with multiple medical issues due to acute hypoxic respiratory failure secondary to pneumonia and pulmonary edema and complicated by acute renal failure and since has been on dialysis, history of atrial fibrillation with RVR and on warfarin, recent C dif colitis which has since cleared -- who prevents with nausea and abdominal pain and found to have a small bowel obstruction with possible closed loop.[JM1.3]        Hospital Course[JM1.2]    Seen by Vascular[JM1.3] surgery[JM1.1] and had exploratory lap on[JM1.3] 10/24[JM1.1] which[JM1.3] showed:    1.  Distal ileal bowel obstruction secondary to adhesive bands.     2.  Focal perforation of distal 1/4 ileum secondary to adhesive band.   Underwent the followin.  Exploratory laparotomy.   2.  Lysis of  adhesions.   3.  Repair of focal distal ileal perforation.     Had prolonged postop ileus, continued on dialysis and occasional ultrafiltration to manage fluid status.[JM1.1]  Overall did well, and bowels moving and good appetite at time of discharge.  Restarted on warfarin and INR 2.51 at time of discharge.      She said she was fine physically at discharge, but quite anxious mentally, and tearful about whether she will be able to take care of herself going forward.  I am hopeful that her strength will improved, she is urinating and it's possible she might be able to come off dialysis if she continues to improve (defer  to Nephrology), and at present will continue dialysis Mon, Wed, Friday, and will check INR in 2 days then as needed.[JM1.3]    Emmanuel Cordova MD  Pager: [phone]  Cell Phone:  [phone][JM1.1]     Significant Results and Procedures[JM1.2]   As above[JM1.3]    Code Status[JM1.2]   Full Code[JM1.3]       Primary Care Physician   Dheeraj Jj    Physical Exam   Temp: 98.4  F (36.9  C) Temp src: Oral BP: 131/76 Pulse: 82 Heart Rate: 76 Resp: 16 SpO2: 95 % O2 Device: None (Room air)    Vitals:    10/29/17 0600 10/30/17 0600 10/30/17 1123   Weight: 68.9 kg (151 lb 14.4 oz) 70.4 kg (155 lb 3.3 oz) 70.4 kg (155 lb 3.3 oz)[JM1.2]     Vital Signs with Ranges[JM1.1]  Temp:  [97.4  F (36.3  C)-99.1  F (37.3  C)] 98.4  F (36.9  C)  Pulse:  [69-87] 82  Heart Rate:  [76-88] 76  Resp:  [14-16] 16  BP: (120-157)/(65-78) 131/76  SpO2:  [92 %-96 %] 95 %  I/O last 3 completed shifts:  In: 1685 [P.O.:100; I.V.:1585]  Out: 2000 [Other:2000][JM1.2]    Exam on discharge:[JM1.1] Alert, oriented x 3, abdomin soft and non-tender with normal bowel sounds.[JM1.3]    Discharge Disposition[JM1.2]   Discharged to long-term care facility[JM1.3]  Condition at discharge:[JM1.1] Good[JM1.3]    Consultations This Hospital Stay   SPIRITUAL HEALTH SERVICES IP CONSULT  SURGERY GENERAL IP CONSULT  NEPHROLOGY IP CONSULT  PHARMACY TO DOSE PHYTONADIONE  PHYSICAL THERAPY ADULT IP CONSULT  OCCUPATIONAL THERAPY ADULT IP CONSULT  SOCIAL WORK IP CONSULT  PHARMACY TO DOSE WARFARIN  SOCIAL WORK IP CONSULT  PHYSICAL THERAPY ADULT IP CONSULT  OCCUPATIONAL THERAPY ADULT IP CONSULT    Time Spent on this Encounter[JM1.2]   I spent a total of 35 minutes discharging this patient.[JM1.1]     Discharge Orders     General info for SNF   Length of Stay Estimate: Short Term Care: Estimated # of Days <30  Condition at Discharge: Improving  Level of care:skilled   Rehabilitation Potential: Good  Admission H&P remains valid and up-to-date: Yes  Recent  Chemotherapy: N/A  Use Nursing Home Standing Orders: Yes     Mantoux instructions   Give two-step Mantoux (PPD) Per Facility Policy Yes     Reason for your hospital stay   Small bowel obstruction.     Additional Discharge Instructions   Call Dr. Stuart at Pager [phone] if questions, or Cell Phone [phone].     Activity - Up ad beata     Follow Up and recommended labs and tests   Follow up with primary care provider 1 week after discharge from TCU.  Check INR in 2 days, and adjust warfarin dose for desired INR of 2.0 to 3.0.      Follow up with Dr. Rashard Zafar, Vascular Surgery, in 2 weeks.    Continue Dialysis Mon, Wed, Friday as arranged by Nephrology.     Glucose monitor nursing POCT   Glucometer check twice daily, call physician if > 200 x 3 consecutive checks.     Full Code     Physical Therapy Adult Consult   Evaluate and treat as clinically indicated.    Reason:  Weakness related to deconditioning     Occupational Therapy Adult Consult   Evaluate and treat as clinically indicated.    Reason:  Assistance with ADL's       Discharge Medications   Current Discharge Medication List      START taking these medications    Details   metoprolol (LOPRESSOR) 25 MG tablet Take 0.5 tablets (12.5 mg) by mouth 2 times daily  Qty: 60 tablet    Associated Diagnoses: Atrial flutter, unspecified type (H)      LORazepam (ATIVAN) 0.5 MG tablet Take 0.5 tablets (0.25 mg) by mouth every 8 hours as needed for anxiety  Qty: 20 tablet, Refills: 0    Associated Diagnoses: Anxiety      hypromellose-dextran (ARTIFICAL TEARS) SOLN ophthalmic solution Place 1 drop into both eyes 3 times daily as needed for dry eyes    Associated Diagnoses: Dry eyes      famotidine (PEPCID) 20 MG tablet Take 1 tablet (20 mg) by mouth 2 times daily as needed  Qty: 60 tablet, Refills: 1    Comments: For heartburn  Associated Diagnoses: Gastroesophageal reflux disease without esophagitis         CONTINUE these medications which have CHANGED    Details    warfarin (COUMADIN) 2 MG tablet 1 mg daily for aflut and adjust for desired INR 2.0 to 3.0  Qty: 30 tablet    Associated Diagnoses: Atrial flutter, unspecified type (H)         CONTINUE these medications which have NOT CHANGED    Details   AMIODARONE HCL PO Take 200 mg by mouth 2 times daily       nystatin (MYCOSTATIN) 113196 UNIT/GM POWD Apply topically 2 times daily as needed       cholecalciferol 5000 UNITS CAPS Take 1 capsule (5,000 Units) by mouth daily  Refills: 3    Associated Diagnoses: CKD (chronic kidney disease) stage 5, GFR less than 15 ml/min (H)      pramipexole (MIRAPEX) 0.125 MG tablet Take 1 tablet (0.125 mg) by mouth 3 times daily  Qty: 90 tablet, Refills: 3    Associated Diagnoses: Restless leg syndrome      fluticasone (FLONASE) 50 MCG/ACT spray Spray 1 spray into both nostrils daily  Qty: 1 Bottle, Refills: 0    Associated Diagnoses: Nasal congestion      ipratropium - albuterol 0.5 mg/2.5 mg/3 mL (DUONEB) 0.5-2.5 (3) MG/3ML neb solution Take 1 vial by nebulization 3 times daily      ACETAMINOPHEN PO Take 650 mg by mouth every 4 hours as needed for pain For pain 1-5 out of 10      nitroGLYcerin (NITROSTAT) 0.4 MG sublingual tablet Place 1 tablet (0.4 mg) under the tongue every 5 minutes as needed for chest pain  Qty: 25 tablet, Refills: 0    Associated Diagnoses: Hx of non-ST elevation myocardial infarction (NSTEMI); Atherosclerosis of native coronary artery of native heart without angina pectoris; Postsurgical aortocoronary bypass status      pravastatin (PRAVACHOL) 40 MG tablet Take 1 tablet (40 mg) by mouth daily  Qty: 90 tablet, Refills: 3    Associated Diagnoses: Hx of non-ST elevation myocardial infarction (NSTEMI); Atherosclerosis of native coronary artery of native heart without angina pectoris; Type 2 diabetes mellitus with other circulatory complications      calcium acetate (PHOSLO) 667 MG CAPS capsule Take 1 capsule (667 mg) by mouth 3 times daily (with meals)  Qty: 180 capsule     Associated Diagnoses: Chronic kidney disease, unspecified CKD stage      pentoxifylline (TRENTAL) 400 MG CR tablet Take 1 tablet (400 mg) by mouth daily    Associated Diagnoses: Venous stasis ulcer (H)      ORDER FOR DME Equipment being ordered: cpap machine, mask, humidifier, tubing and filters  Qty: 1 Device, Refills: 0    Associated Diagnoses: ANABEL (obstructive sleep apnea)         STOP taking these medications       alum & mag hydroxide-simethicone (MYLANTA ES/MAALOX  ES) 400-400-40 MG/5ML SUSP suspension Comments:   Reason for Stopping:         sodium polystyrene (KAYEXALATE) 15 GM/60ML suspension Comments:   Reason for Stopping:         ondansetron (ZOFRAN ODT) 4 MG ODT tab Comments:   Reason for Stopping:         clonazePAM (KLONOPIN) 0.5 MG tablet Comments:   Reason for Stopping:         darbepoetin hira (ARANESP, ALBUMIN FREE,) 60 MCG/0.3ML injection Comments:   Reason for Stopping:         Calcium Carb-Cholecalciferol 500-400 MG-UNIT TABS Comments:   Reason for Stopping:         Lactobacillus (ACIDOPHILUS) tablet Comments:   Reason for Stopping:             Allergies   Allergies   Allergen Reactions     Ciprofloxacin Nausea and Vomiting     Zofran did not help     Oxycodone Visual Disturbance     Delusions, blackouts      Lisinopril Cough     Sulfa Drugs GI Disturbance     LOSS OF TASTE     Penicillins Other (See Comments)     Swelling and reddness at injection site, pt wanted it on      Data[JM1.2]   Most Recent 3 CBC's:[JM1.3]  Recent Labs   Lab Test  10/27/17   1000  10/26/17   0734  10/24/17   0739   WBC  6.7  5.9  3.6*   HGB  9.2*  8.9*  9.1*   MCV  91  90  91   PLT  320  310  311[JM1.2]      Most Recent 3 BMP's:[JM1.3]  Recent Labs   Lab Test  10/31/17   0745  10/30/17   1440  10/28/17   0725   NA  140  145*  141   POTASSIUM  3.6  2.7*  3.6   CHLORIDE  105  111*  106   CO2  27  26  29   BUN  4*  2*  4*   CR  2.35*  1.09*  2.15*   ANIONGAP  8  8  6   MALICK  7.0*  5.6*  7.5*   GLC  167*  93  115*[JM1.2]      Most Recent 2 LFT's:[JM1.3]  Recent Labs   Lab Test  10/22/17   0305  10/12/17   0456   AST  14  9   ALT  9  8   ALKPHOS  180*  128   BILITOTAL  0.3  0.4[JM1.2]     Most Recent INR's and Anticoagulation Dosing History:  Anticoagulation Dose History     Recent Dosing and Labs Latest Ref Rng & Units 10/18/2017 10/22/2017 10/22/2017 10/23/2017 10/29/2017 10/30/2017 10/31/2017    Warfarin 5 mg - - - - - 5 mg - -    INR 0.86 - 1.14 1.73(H) 3.19(H) 1.45(H) 1.16(H) - 2.50(H) 2.51(H)    INR 0.86 - 1.14 - - - - - - -    INR Point of Care 0.86 - 1.14 - - - - - - -        Most Recent 3 Troponin's:[JM1.3]  Recent Labs   Lab Test  08/26/17   0637  07/11/17   1604  07/11/17   0940   02/22/14   0024   TROPI  <0.015  0.022  0.022   < >   --    TROPONIN   --    --    --    --   0.01    < > = values in this interval not displayed.[JM1.2]     Most Recent Cholesterol Panel:[JM1.3]  Recent Labs   Lab Test  04/12/16   1132   CHOL  127   LDL  47   HDL  65   TRIG  73[JM1.2]     Most Recent 6 Bacteria Isolates From Any Culture (See EPIC Reports for Culture Details):[JM1.3]  Recent Labs   Lab Test  10/14/17   1540  10/14/17   1500  10/12/17   2045  10/11/17   1200  10/02/17   1430  10/01/17   2255   CULT  No growth  No growth  Moderate growth  Normal tyler    No growth  Light growth  Candida albicans / dubliniensis  Candida albicans and Candida dubliniensis are not routinely speciated  Susceptibility testing not routinely done  *  Light growth  Coagulase negative Staphylococcus  Susceptibility testing not routinely done  *  No growth[JM1.2]     Most Recent TSH, T4 and A1c Labs:[JM1.3]  Recent Labs   Lab Test  10/13/17   0644  10/11/17   0420   TSH   --   5.94*   T4   --   1.59*   A1C  6.6*   --[JM1.2]          Revision History        User Key Date/Time User Provider Type Action    > JM1.2 10/31/2017  1:27 PM Emmanuel Stuart MD Physician Sign     JM1.3 10/31/2017  1:14 PM Emmanuel Stuart MD Physician      JM1.1  "10/29/2017  7:49 AM Emmanuel Stuart MD Physician                      Consult Notes      Consults by Sweta Ball RD, LD at 10/23/2017  3:10 PM     Author:  Sweta Ball RD, LD Service:  Nutrition Author Type:  Registered Dietitian    Filed:  10/23/2017  3:24 PM Date of Service:  10/23/2017  3:10 PM Creation Time:  10/23/2017  3:10 PM    Status:  Signed :  Sweta Ball RD, LD (Registered Dietitian)         CLINICAL NUTRITION SERVICES  -  ASSESSMENT NOTE      Future/Additional Recommendations:   If PO intake decreases, would recommend Ensure Clear 2-3 times daily   Malnutrition: Unable to determine due to unable to meet with pt. Pt busy, x 2 attempts        REASON FOR ASSESSMENT  Kathryn Banks is a 77 year old female seen by Registered Dietitian for Admission Nutrition Risk Screen - unintentional wt loss of 10lbs or more over the last 2 months       NUTRITION HISTORY  - Information obtained from chart review: unable to meet with pt, pt busy x 2 attempts  Pt familiar to our service was last seen on 10/17/17:   Per past RD notes, pt does not care for Nepro       CURRENT NUTRITION ORDERS  Diet Order:     Clear Liquid       Current Intake/Tolerance:  -Per RN flowsheet, pt consuming 100% of meals ordered   -IVF at 60mL/hr  -BGM range  (MSSI)      PHYSICAL FINDINGS  Observed  No nutrition-related physical findings observed  Obtained from Chart/Interdisciplinary Team  -Per MD, pt with abdominal pain and distention     ANTHROPOMETRICS  Height: 5'4\"  Weight: 145 lbs 1 oz (65.8 kg )  Body mass index is 24.9 kg/(m^2).  Weight Status:  Overweight BMI 25-29.9  IBW: 54.5 kg   % IBW: 121%  Weight History: wt trending down, wt loss of 10.4 kg (15.8%) over the last month[AP1.1]   Wt Readings from Last 10 Encounters:   10/23/17 65.8 kg (145 lb 1 oz)   10/18/17 62.2 kg (137 lb 2 oz)   10/01/17 77.3 kg (170 lb 8 oz)   09/22/17 77.1 kg (170 lb)   09/21/17 76.2 kg (168 lb)   09/19/17 76.5 kg " (168 lb 11.2 oz)   09/15/17 76.1 kg (167 lb 11.2 oz)   09/13/17 78.7 kg (173 lb 6.4 oz)   09/11/17 78.4 kg (172 lb 12.8 oz)   09/08/17 75.4 kg (166 lb 4.8 oz)[AP1.2]       LABS  Labs reviewed    MEDICATIONS  Medications reviewed    Dosing Weight 65.8 kg (actual wt)     ASSESSED NUTRITION NEEDS PER APPROVED PRACTICE GUIDELINES:  Estimated Energy Needs: 1665-4552 kcals (25-30 Kcal/Kg)  Justification: maintenance  Estimated Protein Needs: 79-99 grams protein (1.2-1.5 g pro/Kg)  Justification: maintenance  Estimated Fluid Needs: 6924-6802 mL (25-30 mL/kg)  Justification: maintenance    MALNUTRITION:  % Weight Loss:  > 5% in 1 month (severe malnutrition)  % Intake:  Difficult to assess at this time   Subcutaneous Fat Loss:  Unable to assess   Muscle Loss:  Unable to assess   Fluid Retention:  None noted    Malnutrition Diagnosis: Unable to determine due to unable to meet with pt. Pt busy, x 2 attempts      NUTRITION DIAGNOSIS:  Unintended weight loss related to suspect decreased PO intake and altered GI as evidenced by wt loss of 10.4 kg (15.8%) over the last month     NUTRITION INTERVENTIONS  Recommendations / Nutrition Prescription  Advance diet as medically able  Meals TID      Implementation  Nutrition education: Not appropriate at this time     Nutrition Goals  Pt to consume at least 75% of meals ordered       MONITORING AND EVALUATION:  Progress towards goals will be monitored and evaluated per protocol and Practice Guidelines      Sweta Ball RD[AP1.1]     Revision History        User Key Date/Time User Provider Type Action    > AP1.2 10/23/2017  3:24 PM Sweta Ball RD, CAROLE Registered Dietitian Sign     AP1.1 10/23/2017  3:10 PM Sweta Ball RD, LD Registered Dietitian             Consults by Dheeraj Perez MD at 10/22/2017  4:26 PM     Author:  Dheeraj Perez MD Service:  Surgery Author Type:  Physician    Filed:  10/22/2017  4:32 PM Date of Service:  10/22/2017  4:26 PM Creation Time:   10/22/2017  4:24 PM    Status:  Signed :  Dheeraj Perez MD (Physician)     Consult Orders:    1. Surgery General IP Consult: Patient to be seen: Routine - within 24 hours; SBO; Consultant may enter orders: Yes [[id number]] ordered by Cara Fisher MD at 10/22/17 0738                [facility] Consultation/H&P    Kathryn Banks MRN#: [id number]   Age: 77 year old YOB: 1940     Date of Admission:          10/22/2017  Reason for consult/H&P: Abdominal pain   Surgeon:      Dheeraj Perez MD                  Chief Complaint:   Abdominal pain         History of Present Illness:   This patient is a 77 year old  female who presented to the [facility] ER with abdominal pain.  This developed over two days.  She was recently in the hospital for pneumonia.  She went home four days ago.[PB1.1] She came to the emergency room with abdominal pain which is now largely resolved.  She is not passing flatus or BM today.  She is burping.  NG tube was placed with minimal drainage.  She says the pain was in the lower abdomen but is not present anymore.   She thinks she had a gastric bypass at [facility] about eight years ago and lost 100 pounds.  She also had a hysterectomy in the past.  She feels weak and says she has not eaten for three days.  She does not know if she can stand up.[PB1.2] Denies fever, chills,change in urination.[PB1.1] No trauma to the abdomen[PB1.2]  Denies having any previous episodes[PB1.1].[PB1.2] History is obtained from the patient and chart.         Past Medical History:    has a past medical history of Carpal tunnel syndrome; Coronary atherosclerosis of native coronary artery (2006); OBSTRUCTIVE SLEEP APNEA; Osteoarthrosis, unspecified whether generalized or localized, unspecified site; Other and combined forms of senile cataract (2000); Other and unspecified hyperlipidemia; RESTLESS LEGS SYNDROME; TENOSYNOV HAND/WRIST NEC (6/30/2006); Type II or unspecified  type diabetes mellitus without mention of complication, not stated as uncontrolled (2001); Typical atrial flutter (H) (01/17/2007); and Unspecified essential hypertension.          Past Surgical History:     Past Surgical History:   Procedure Laterality Date     ANGIOPLASTY       C APPENDECTOMY       C CABG, VEIN, FIVE  12/06    SVG x 4 and LIMA to LAD     C SOTO W/O FACETEC FORAMOT/DSKC 1/2 VRT SEG, LUMBAR  1968     C REMV CATARACT INTRACAP,INSERT LENS      Bilateral     C TOTAL ABDOM HYSTERECTOMY  1995     - fibroids     C VAGOTOMY/PYLOROPLASTY,SELECT  1970     COLONOSCOPY  12/3/2007     COLONOSCOPY  1/17/2014    Procedure: COLONOSCOPY;  Colonoscopy;  Surgeon: Zack Stearns MD;  Location:  GI     CYSTOSCOPY  11/25/09    [facility]     ENDOSCOPY  03/21/2000    Upper GI     HC COLONOSCOPY THRU STOMA, DIAGNOSTIC  10/7/04    poor prep, repeat in 2-3 years     HC COLONOSCOPY W/WO BRUSH/WASH  10/31/07    Repeat-poor quality prep     HC REMOVAL OF OVARY/TUBE(S)      Salpingo-Oophorectomy, Unilateral     HC REVISE MEDIAN N/CARPAL TUNNEL SURG      Carpal tunnel release     HC UGI ENDOSCOPY DIAG W BIOPSY  10/31/07     HC UGI ENDOSCOPY, SIMPLE EXAM  12/3/2007     OPEN REDUCTION INTERNAL FIXATION HIP NAILING Left 7/3/2016    Procedure: OPEN REDUCTION INTERNAL FIXATION HIP NAILING;  Surgeon: Lake Schulz MD;  Location:  OR            Medications:     Prior to Admission medications    Medication Sig Start Date End Date Taking? Authorizing Provider   alum & mag hydroxide-simethicone (MYLANTA ES/MAALOX  ES) 400-400-40 MG/5ML SUSP suspension Take 10 mLs by mouth every 6 hours as needed for indigestion Take for 3 days starting on 10/21/17.   Yes Unknown, Entered By History   WARFARIN SODIUM PO Take 3.5 mg by mouth daily   Yes Unknown, Entered By History   sodium polystyrene (KAYEXALATE) 15 GM/60ML suspension Take 30 g by mouth Take as a single dose directed for emergency use only as directed by nephrologist.   Yes  Unknown, Entered By History   ondansetron (ZOFRAN ODT) 4 MG ODT tab Take 4 mg by mouth every 6 hours as needed for nausea   Yes Unknown, Entered By History   AMIODARONE HCL PO Take 200 mg by mouth 2 times daily    Yes Reported, Patient   nystatin (MYCOSTATIN) 873251 UNIT/GM POWD Apply topically 2 times daily as needed    Yes Reported, Patient   cholecalciferol 5000 UNITS CAPS Take 1 capsule (5,000 Units) by mouth daily 10/18/17 11/17/17 Yes Hank Babcock MD   clonazePAM (KLONOPIN) 0.5 MG tablet Take 0.5 tablets (0.25 mg) by mouth 2 times daily as needed for anxiety 10/18/17 10/25/17 Yes Hank Babcock MD   darbepoetin hira (ARANESP, ALBUMIN FREE,) 60 MCG/0.3ML injection Inject 0.3 mLs (60 mcg) Subcutaneous every 14 days As needed for hgb<10g/dL.  If Hgb increases >1 point in 2 weeks (if blood transfusion given, use hgb PRIOR to this), SYSTOLIC BP > 180 mmHg or hgb>=10g/dL, HOLD DOSE. Dose must be within 1 week of Hgb.  Per anemia protocol with Vibha Barfield MD/Yesy Samaniego,PharmD [phone] 9/20/17  Yes Vibha Barfield MD   pramipexole (MIRAPEX) 0.125 MG tablet Take 1 tablet (0.125 mg) by mouth 3 times daily 9/15/17  Yes Mike Irene MD   fluticasone (FLONASE) 50 MCG/ACT spray Spray 1 spray into both nostrils daily 9/1/17  Yes Mike Tadeo MD   Calcium Carb-Cholecalciferol 500-400 MG-UNIT TABS Take 1 tablet by mouth 2 times daily   Yes Unknown, Entered By History   ipratropium - albuterol 0.5 mg/2.5 mg/3 mL (DUONEB) 0.5-2.5 (3) MG/3ML neb solution Take 1 vial by nebulization 3 times daily   Yes Unknown, Entered By History   ACETAMINOPHEN PO Take 650 mg by mouth every 4 hours as needed for pain For pain 1-5 out of 10   Yes Reported, Patient   nitroGLYcerin (NITROSTAT) 0.4 MG sublingual tablet Place 1 tablet (0.4 mg) under the tongue every 5 minutes as needed for chest pain 8/18/17  Yes Yareli Lorenzo MD   pravastatin (PRAVACHOL) 40 MG tablet Take 1 tablet (40 mg) by  mouth daily 8/18/17  Yes Yareli Lorenzo MD   calcium acetate (PHOSLO) 667 MG CAPS capsule Take 1 capsule (667 mg) by mouth 3 times daily (with meals) 8/18/17  Yes Yareli Lorenzo MD   pentoxifylline (TRENTAL) 400 MG CR tablet Take 1 tablet (400 mg) by mouth daily 8/18/17  Yes Yareli Lorenzo MD   Lactobacillus (ACIDOPHILUS) tablet Take 1 tablet by mouth daily 8/18/17  Yes Lake Pierre MD   ORDER FOR DME Equipment being ordered: cpap machine, mask, humidifier, tubing and filters 3/4/14   Dheeraj Jj MD            Allergies:     Allergies   Allergen Reactions     Ciprofloxacin Nausea and Vomiting     Zofran did not help     Oxycodone Visual Disturbance     Delusions, blackouts      Lisinopril Cough     Sulfa Drugs GI Disturbance     LOSS OF TASTE            Social History:     Social History   Substance Use Topics     Smoking status: Former Smoker     Years: 10.00     Quit date: 1/1/1969     Smokeless tobacco: Never Used     Alcohol use 0.0 oz/week     0 Standard drinks or equivalent per week      Comment: 1/2 a beer once a month             Family History:    This patient has no significant relevant family history.  Family history is reviewed in detail.          Review of Systems:   Brief ROS is negative other than noted in the HPI.  C: NEGATIVE for fever, chills, change in weight  R: NEGATIVE for significant cough or SOB  CV: NEGATIVE for chest pain, palpitations or peripheral edema  GI:  NEGATIVE for dysuria, heartburn, or change in bowel habits  H: NEGATIVE for bleeding problems         Physical Exam:   Blood pressure 113/66, pulse 90, temperature 98.2  F (36.8  C), temperature source Oral, resp. rate 16, weight 65.8 kg (145 lb), SpO2 98 %, not currently breastfeeding.       General - This is a well developed, well nourished female .  HEENT - Normocephalic. Atraumatic. Moist mucous membranes. Pupils equal.  No scleral icterus. Nose normal.  Neck - Supple without masses. No cervical  adenopathy or thyromegaly  Lungs - Breathing not labored  Chest - Not tender. CVA's nontender  Heart - Regular rate & rhythm   Abdomen - Soft,[PB1.1] minimally tender[PB1.2], nondistended with +bowel sounds, no organomegaly.  Extremities - Moves all extremities. Warm without edema.  Neurologic - Nonfocal.          Data:   Labs:  Recent Labs   Lab Test  10/22/17   0305  10/16/17   1020  10/15/17   0748  10/14/17   0539  10/12/17   0456   WBC  7.4   --    --   7.4  9.4   HGB  10.4*  9.6*  10.4*  8.7*  8.1*   HCT  33.1*   --    --   28.5*  26.6*   PLT  357   --    --   205  161     Recent Labs   Lab Test  10/22/17   0305  10/16/17   0545  10/15/17   0700   POTASSIUM  4.1  4.3  3.9   CHLORIDE  96  111*  111*   CO2  30  22  25   BUN  21  26  18   CR  3.91*  4.51*  3.69*     Recent Labs   Lab Test  10/22/17   0305  10/12/17   0456  10/11/17   0420   01/31/13   1025   06/07/12   1222   BILITOTAL  0.3  0.4  0.3   < >  0.4   < >  0.3   ALT  9  8  9   < >  28   < >  15   AST  14  9  12   < >  21   < >  21   ALKPHOS  180*  128  145   < >  232*   < >  147   AMYLASE   --    --    --    --    --    --   67   LIPASE  124   --    --    --   213   --   160    < > = values in this interval not displayed.     Recent Labs   Lab Test  10/22/17   0305  10/18/17   0625  10/17/17   0615   10/11/17   0420   08/27/17   1013  08/27/17   0836  05/16/14 05/07/14   INR  3.19*  1.73*  1.51*   < >  1.08   < >  1.25*  Unsatisfactory specimen - clotted   < >  6.986   < >  1.5   PT   --    --    --    --    --    --    --    --    --   74.1   --    --    PTT   --    --    --    --   39*   --   35  Unsatisfactory specimen - clotted   --    --    --    --     < > = values in this interval not displayed.     Recent Labs   Lab Test  10/22/17   0305  10/16/17   0545  10/15/17   0700   10/12/17   1600   10/10/17   0409  10/09/17   0441   MALICK  8.2*  7.4*  7.7*   < >   --    < >  7.7*  7.6*   PHOS   --   4.9*  3.8   --   3.9   --   2.0*  3.4   MAG   --     --   1.8   --    --    --   1.7  2.1    < > = values in this interval not displayed.     Recent Labs   Lab Test  10/22/17   0305  10/16/17   0545  10/15/17   0700   10/12/17   0456   10/01/17   2255   09/13/17   1940   08/26/17   1250   ANIONGAP  10  10  8   < >  6   < >   --    < >   --    < >   --    PROTEIN   --    --    --    --    --    --   30*   --   Negative   --   Negative   ALBUMIN  2.5*  2.2*   --    --   2.6*   < >   --    < >   --    < >   --     < > = values in this interval not displayed.       CT scan of the abdomen:[PB1.1] images reviewed in detail. Many dilated SB loops. Several non-dilated loops, some in pelvis. I do not see convincing evidence for twist or closed loop obstruction, rather see narrowing at pelvic brim.[PB1.2]     Ultrasound of the abdomen:[PB1.1] not done[PB1.2]        EKG: Complete; See Chart         Assessment:[PB1.1]   Dilated bowel loops. Minimal NG output, pain resolved. Not clear if she has a bowel obstruction.[PB1.2]          Plan:[PB1.1]    Reverse warfarin. abd film in AM. Try hard to stand up and perhaps walk.[PB1.2]        I have discussed the history, physical, and plan with the patient.   Dheeraj Perez M.D.[PB1.1]          Revision History        User Key Date/Time User Provider Type Action    > PB1.2 10/22/2017  4:32 PM Dheeraj Perez MD Physician Sign     PB1.1 10/22/2017  4:24 PM Dheeraj Perez MD Physician             Consults by BEATRIZ Galeas MD at 10/22/2017 12:16 PM     Author:  BEATRIZ Galeas MD Service:  Nephrology Author Type:  Physician    Filed:  10/22/2017 12:16 PM Date of Service:  10/22/2017 12:16 PM Creation Time:  10/22/2017 12:10 PM    Status:  Signed :  BEATRIZ Galeas MD (Physician)     Consult Orders:    1. Nephrology IP Consult: Patient to be seen: Routine - within 24 hours; ESRD on dialysis with SBO; Consultant may enter orders: Yes [[id number]] ordered by Cara Fisher MD at 10/22/17 0738                Renal Medicine  Progress Note                                Kathryn Banks MRN# [id number]   Age: 77 year old YOB: 1940   Date of Admission: 10/22/2017 Hospital LOS:[GO1.1] 0[GO1.2]                  Assessment/Plan:     Admitted with concern for SBO    Seen regarding management of ESRD    1.  ESRD   -MWF schedule   -ran here 10/18/17   -[facility] 10/20/17  2.  Initiated dialysis last admission   -tunneled line  3.  Anemia  4.  Secondary hyperparathyroidism  5.  SBO      Next scheduled dialysis 10/23/17        Interval History:     In bed.  NG to suction.  Seems comfortable    ROS:     GENERAL: NAD, No fever,chills  R: NEGATIVE for significant cough or SOB  CV: NEGATIVE for chest pain, palpitations  EXT: no change edema  ROS otherwise negative    Medications and Allergies:     Reviewed    Physical Exam:     Vitals were reviewed[GO1.1]  Patient Vitals for the past 8 hrs:   BP Temp Temp src Pulse Resp SpO2   10/22/17 1027 102/63 98.1  F (36.7  C) Oral 90 18 98 %   10/22/17 0951 108/64 98.2  F (36.8  C) Oral 95 18 99 %   10/22/17 0933 131/79 - - - - -   10/22/17 0932 - - - - 18 -   10/22/17 0905 127/78 - - - - -   10/22/17 0745 - - - - - 97 %   10/22/17 0744 - - - - - 98 %   10/22/17 0743 108/71 - - - - -[GO1.3]          Vitals:    10/22/17 0247   Weight: 65.8 kg (145 lb)[GO1.2]         GENERAL: awake, alert, follows  HEENT: NC/AT, PERRLA, EOMI, non icteric, pharynx moist without lesion  RESP:  clear anteriorly  CV: RRR, normal S1 S2  ABDOMEN: distended  MS: no clubbing, cyanosis   SKIN: clear without significant rashes or lesions  EXT: warm, no edema    Data:[GO1.1]       Recent Labs  Lab 10/22/17  0305 10/16/17  0545    143   POTASSIUM 4.1 4.3   CHLORIDE 96 111*   CO2 30 22   ANIONGAP 10 10   * 102*   BUN 21 26   CR 3.91* 4.51*   GFRESTIMATED 11* 9*   GFRESTBLACK 14* 11*   MALICK 8.2* 7.4*[GO1.2]           Recent Labs  Lab 10/22/17  0305 10/16/17  1020 10/16/17  0545   PHOS  --   --  4.9*   HGB  10.4* 9.6*  --[GO1.4]          BEATRIZ Galeas    [facility] - Nephrology  [phone][GO1.1]     Revision History        User Key Date/Time User Provider Type Action    > GO1.4 10/22/2017 12:16 PM BEATRIZ Galeas MD Physician Sign     GO1.3 10/22/2017 12:15 PM BEATRIZ Galeas MD Physician      GO1.2 10/22/2017 12:11 PM BEATRIZ Galeas MD Physician      GO1.1 10/22/2017 12:10 PM BEATRIZ Galeas MD Physician                      Progress Notes - Physician (Notes for yesterday and today)      Progress Notes by Hank Guerra MD at 11/1/2017 10:30 AM     Author:  Hank Guerra MD Service:  Nephrology Author Type:  Physician    Filed:  11/1/2017 10:41 AM Date of Service:  11/1/2017 10:30 AM Creation Time:  11/1/2017 10:30 AM    Status:  Signed :  Hank Guerra MD (Physician)                  Assessment and Plan:   ESRD: dialysis today with R CVC and 400 BFR. UF 2-3 L. 3K and 33 HCO3.[JT1.1] I called the [facility] unit she will run at in [place] and gave them orders.[JT1.2]             Interval History:   Hypertension  Anemia : on EPO     Afib: amiodarone, metoprolol and coumadin.              Review of Systems:[JT1.1]   No sx on dialysis.[JT1.2]           Medications:[JT1.1]       epoetin hira (EPOGEN,PROCRIT) inj ESRD  4,000 Units Intravenous See Admin Instructions     heparin (porcine)  500 Units Hemodialysis Machine Once in dialysis     pantoprazole  40 mg Oral QAM     metoprolol  12.5 mg Oral BID     insulin aspart  1-7 Units Subcutaneous TID AC     insulin aspart  1-5 Units Subcutaneous At Bedtime     metoclopramide  5 mg Intravenous Q8H     pramipexole  0.125 mg Oral TID     fluticasone  1 spray Both Nostrils Daily     miconazole   Topical BID     amiodarone (PACERONE/CODARONE) tablet 200 mg  200 mg Oral BID     - MEDICATION INSTRUCTIONS for Dialysis Patients -   Does not apply See Admin Instructions       heparin (porcine)       Warfarin Therapy  Reminder[JT1.3]       Current active medications and PTA medications reviewed, see medication list for details.            Physical Exam:   Vitals were reviewed  Patient Vitals for the past 24 hrs:   BP Temp Temp src Pulse Heart Rate Resp SpO2   17 1015 (P) 166/80 - - - (P) 64 - -   17 0945 144/77 - - - 66 - -   17 0915 161/79 97.7  F (36.5  C) Oral - 73 16 95 %   17 0815 151/86 97.8  F (36.6  C) Axillary - 80 16 94 %   17 0000 151/81 97.9  F (36.6  C) Oral - 74 16 94 %   10/31/17 1902 141/74 98.5  F (36.9  C) Oral 78 79 16 97 %   10/31/17 1538 137/74 98.1  F (36.7  C) Oral - 73 16 95 %   10/31/17 1100 131/76 98.4  F (36.9  C) Oral - 76 16 95 %       Temp:  [97.7  F (36.5  C)-98.5  F (36.9  C)] 97.7  F (36.5  C)  Pulse:  [78] 78  Heart Rate:  [64-80] (P) 64  Resp:  [16] 16  BP: (131-161)/(74-86) (P) 166/80  SpO2:  [94 %-97 %] 95 %    Temperatures:  Current - Temp: 97.7  F (36.5  C); Max - Temp  Av.1  F (36.7  C)  Min: 97.7  F (36.5  C)  Max: 98.5  F (36.9  C)  Respiration range: Resp  Av  Min: 16  Max: 16  Pulse range: Pulse  Av  Min: 78  Max: 78  Blood pressure range: Systolic (24hrs), Av , Min:131 , Max:166   ; Diastolic (24hrs), Av, Min:74, Max:86    Pulse oximetry range: SpO2  Av %  Min: 94 %  Max: 97 %    I/O last 3 completed shifts:  In: 325.4 [P.O.:240; I.V.:85.4]  Out: 1225 [Urine:275; Other:350; Stool:600]      Intake/Output Summary (Last 24 hours) at 17 1030  Last data filed at 17 0512   Gross per 24 hour   Intake              240 ml   Output             1125 ml   Net             -885 ml[JT1.1]       Alert  R CVC with no redness or tenderness[JT1.2]             Wt Readings from Last 4 Encounters:   10/30/17 70.4 kg (155 lb 3.3 oz)   10/18/17 62.2 kg (137 lb 2 oz)   10/01/17 77.3 kg (170 lb 8 oz)   17 77.1 kg (170 lb)          Data:          Lab Results   Component Value Date     2017     10/31/2017      10/30/2017    Lab Results   Component Value Date    CHLORIDE 108 11/01/2017    CHLORIDE 105 10/31/2017    CHLORIDE 111 10/30/2017    Lab Results   Component Value Date    BUN 8 11/01/2017    BUN 4 10/31/2017    BUN 2 10/30/2017      Lab Results   Component Value Date    POTASSIUM 4.2 11/01/2017    POTASSIUM 3.6 10/31/2017    POTASSIUM 2.7 10/30/2017    Lab Results   Component Value Date    CO2 27 11/01/2017    CO2 27 10/31/2017    CO2 26 10/30/2017    Lab Results   Component Value Date    CR 2.85 11/01/2017    CR 2.35 10/31/2017    CR 1.09 10/30/2017        Recent Labs   Lab Test  10/27/17   1000  10/26/17   0734  10/24/17   0739   WBC  6.7  5.9  3.6*   HGB  9.2*  8.9*  9.1*   HCT  30.2*  29.4*  30.0*   MCV  91  90  91   PLT  320  310  311     Recent Labs   Lab Test  10/22/17   0305  10/12/17   0456  10/11/17   0420   AST  14  9  12   ALT  9  8  9   ALKPHOS  180*  128  145   BILITOTAL  0.3  0.4  0.3       Recent Labs   Lab Test  10/15/17   0700  10/10/17   0409  10/09/17   0441   MAG  1.8  1.7  2.1     Recent Labs   Lab Test  10/16/17   0545  10/15/17   0700  10/12/17   1600   PHOS  4.9*  3.8  3.9     Recent Labs   Lab Test  11/01/17   0915  10/31/17   0745  10/30/17   1440   MALICK  7.1*  7.0*  5.6*       Lab Results   Component Value Date    MALICK 7.1 (L) 11/01/2017     Lab Results   Component Value Date    WBC 6.7 10/27/2017    HGB 9.2 (L) 10/27/2017    HCT 30.2 (L) 10/27/2017    MCV 91 10/27/2017     10/27/2017     Lab Results   Component Value Date     11/01/2017    POTASSIUM 4.2 11/01/2017    CHLORIDE 108 11/01/2017    CO2 27 11/01/2017     (H) 11/01/2017     Lab Results   Component Value Date    BUN 8 11/01/2017    CR 2.85 (H) 11/01/2017     Lab Results   Component Value Date    MAG 1.8 10/15/2017     Lab Results   Component Value Date    PHOS 4.9 (H) 10/16/2017       Creatinine   Date Value Ref Range Status   11/01/2017 2.85 (H) 0.52 - 1.04 mg/dL Final   10/31/2017 2.35 (H) 0.52 - 1.04 mg/dL  Final   10/30/2017 1.09 (H) 0.52 - 1.04 mg/dL Final   10/28/2017 2.15 (H) 0.52 - 1.04 mg/dL Final   10/27/2017 3.00 (H) 0.52 - 1.04 mg/dL Final   10/26/2017 2.22 (H) 0.52 - 1.04 mg/dL Final       Attestation:  I have reviewed today's vital signs, notes, medications, labs and imaging.  Seen on dialysis.      Hank Guerra MD[JT1.1]               Revision History        User Key Date/Time User Provider Type Action    > JT1.2 2017 10:41 AM Hank Guerra MD Physician Sign     JT1.3 2017 10:31 AM Hank Guerra MD Physician      JT1.1 2017 10:30 AM Hank Guerra MD Physician             Progress Notes by Rashard Zafar MD at 2017  7:48 AM     Author:  Rashard Zafar MD Service:  Vascular Surgery Author Type:  Physician    Filed:  2017  7:49 AM Date of Service:  2017  7:48 AM Creation Time:  2017  7:48 AM    Status:  Signed :  Rashard Zafar MD (Physician)          Surgery Progress Note    S: No complaints.  No place in TCU yesterday.  [place] diet    O:   Vitals:  BP  Min: 120/67  Max: 151/81  Temp  Av.4  F (36.9  C)  Min: 97.9  F (36.6  C)  Max: 99.1  F (37.3  C)  Pulse  Av  Min: 78  Max: 78  I/O last 3 completed shifts:  In: 325.4 [P.O.:240; I.V.:85.4]  Out: 1225 [Urine:275; Other:350; Stool:600]    Physical Exam:  Wd=A   + BM      Assessment/Plan: Doing well.  Dialysis this AM.  TCU later today.      Wm. MD Andrade[WO1.1]       Revision History        User Key Date/Time User Provider Type Action    > WO1.1 2017  7:49 AM Rashard Zafar MD Physician Sign            Progress Notes by Emmanuel Stuart MD at 10/31/2017  5:34 PM     Author:  Emmanuel Stuart MD Service:  Hospitalist Author Type:  Physician    Filed:  10/31/2017  5:35 PM Date of Service:  10/31/2017  5:34 PM Creation Time:  10/31/2017  5:34 PM    Status:  Signed :  Emmanuel Stuart MD (Physician)          [facility]    Hospitalist Progress Note    Assessment & Plan   Kathryn Banks is a 77 year old female who was admitted on 10/22/2017 with SBO and is doing well after lysis of adhesions, advanced to low fiber diet, bowels moved.  Dialysis tomorrow.     Impression:   Principal Problem:    SBO -- S/P Lysis of Adhes 10/24/17  Active Problems:    ANABEL on CPAP    Esophageal reflux    CAD - NSTEMI, CABG x 5 in 2006, angio 2013 patent grafts except occluded graft to PL    CRF (chronic renal failure), stage 4 (severe) (H)    Essential hypertension with goal blood pressure less than 140/90    Acute on chronic renal failure -- Dialysis started 10/2017 -- ?renal function improving    Anemia in chronic kidney disease    Paroxysmal atrial fibrillation -- on Warfarin, hold warfarin today    Postoperative ileus (H)    DM type 2 -- Hgb A1C 6.6 on 10/13/17    Restless leg syndrome      Plan:  Continue Postop care, warfarin 1 mg today, dialysis MWF.        DVT Prophylaxis: Pneumatic Compression Devices  Code Status: Prior    Disposition: Expected discharge in 1 day once bowel function returns to TCU -- would like to [facility] to [facility].    Emmanuel Cordova MD  Pager [phone]  Cell Phone [phone]  Text Page (7am to 6pm)    Interval History   Feels good, tolerating clear liquids.  She believes she is urinating more.      Physical Exam   Temp: 98.1  F (36.7  C) Temp src: Oral BP: 137/74 Pulse: 82 Heart Rate: 73 Resp: 16 SpO2: 95 % O2 Device: None (Room air)    Vitals:    10/29/17 0600 10/30/17 0600 10/30/17 1123   Weight: 68.9 kg (151 lb 14.4 oz) 70.4 kg (155 lb 3.3 oz) 70.4 kg (155 lb 3.3 oz)     Vital Signs with Ranges  Temp:  [97.4  F (36.3  C)-99.1  F (37.3  C)] 98.1  F (36.7  C)  Pulse:  [82] 82  Heart Rate:  [73-88] 73  Resp:  [14-16] 16  BP: (120-137)/(65-76) 137/74  SpO2:  [92 %-95 %] 95 %  I/O last 3 completed shifts:  In: 1770.4 [P.O.:100; I.V.:1670.4]  Out: 2775 [Urine:175;  Other:2000; Stool:600]    Constitutional: Awake, alert, cooperative, no apparent distress  Respiratory: Clear to auscultation bilaterally, no crackles or wheezing  Cardiovascular: Regular rate and rhythm, normal S1 and S2, and no murmur noted  GI: some bowel sounds, soft, non-distended, slight tenderness in right and left lower quadrants, clean midline incision   Extrem: No calf tenderness, no ankle edema  Neuro: Ox3, no focal motor or sensory deficits    Medications     Warfarin Therapy Reminder         warfarin  1 mg Oral ONCE at 18:00     pantoprazole  40 mg Oral QAM     metoprolol  12.5 mg Oral BID     insulin aspart  1-7 Units Subcutaneous TID AC     insulin aspart  1-5 Units Subcutaneous At Bedtime     metoclopramide  5 mg Intravenous Q8H     pramipexole  0.125 mg Oral TID     fluticasone  1 spray Both Nostrils Daily     miconazole   Topical BID     amiodarone (PACERONE/CODARONE) tablet 200 mg  200 mg Oral BID     - MEDICATION INSTRUCTIONS for Dialysis Patients -   Does not apply See Admin Instructions       Data     Recent Labs  Lab 10/31/17  0745 10/30/17  1440 10/28/17  0725 10/27/17  1000 10/26/17  0734   WBC  --   --   --  6.7 5.9   HGB  --   --   --  9.2* 8.9*   MCV  --   --   --  91 90   PLT  --   --   --  320 310   INR 2.51* 2.50*  --   --   --     145* 141 140 139   POTASSIUM 3.6 2.7* 3.6 3.4 3.4   CHLORIDE 105 111* 106 108 103   CO2 27 26 29 26 29   BUN 4* 2* 4* 8 7   CR 2.35* 1.09* 2.15* 3.00* 2.22*   ANIONGAP 8 8 6 6 7   MALICK 7.0* 5.6* 7.5* 7.2* 7.3*   * 93 115* 115* 107*       Imaging:   No results found for this or any previous visit (from the past 24 hour(s)).[JM1.1]      Revision History        User Key Date/Time User Provider Type Action    > JM1.1 10/31/2017  5:35 PM Emmanuel Stuart MD Physician Sign            Progress Notes by Hank Guerra MD at 10/31/2017 10:38 AM     Author:  Hank Guerra MD Service:  Nephrology Author Type:  Physician    Filed:   10/31/2017 10:55 AM Date of Service:  10/31/2017 10:38 AM Creation Time:  10/31/2017 10:38 AM    Status:  Signed :  Hank Guerra MD (Physician)                  Assessment and Plan:   ESRD: HD yesterday, 3H, 3K, 2 L UF, , RIJ CVC. Pt will need to resume MWF Dialysis at [facility] upon discharge.   [phone] (or [phone]). Dialysis in am if still here.             Interval History:   Anemia: EPO on dialysis.       SBO?: S/P lysis of adhesions 10/24. Felt to have resolving ileus per Surgery.   Hypertension:  Metoprolol                 Review of Systems:   Tolerating po well. Surgery feels ok for discharge. To go to TCU.           Medications:       pantoprazole  40 mg Oral QAM     metoprolol  12.5 mg Oral BID     insulin aspart  1-7 Units Subcutaneous TID AC     insulin aspart  1-5 Units Subcutaneous At Bedtime     metoclopramide  5 mg Intravenous Q8H     pramipexole  0.125 mg Oral TID     fluticasone  1 spray Both Nostrils Daily     miconazole   Topical BID     amiodarone (PACERONE/CODARONE) tablet 200 mg  200 mg Oral BID     - MEDICATION INSTRUCTIONS for Dialysis Patients -   Does not apply See Admin Instructions       Warfarin Therapy Reminder       dextrose 5% and 0.45% NaCl 50 mL/hr at 10/30/17 2022     NaCl 10 mL/hr at 10/24/17 1352     Current active medications and PTA medications reviewed, see medication list for details.            Physical Exam:   Vitals were reviewed  Patient Vitals for the past 24 hrs:   BP Temp Temp src Pulse Heart Rate Resp SpO2 Weight   10/31/17 0805 120/67 99.1  F (37.3  C) Oral - 88 14 95 % -   10/31/17 0141 133/69 97.4  F (36.3  C) Oral - 78 14 94 % -   10/30/17 2035 128/65 98.7  F (37.1  C) Oral 82 - 14 92 % -   10/30/17 1500 157/78 98.2  F (36.8  C) Oral 71 - 16 96 % -   10/30/17 1446 137/74 - - 69 - - - -   10/30/17 1430 139/71 - - 71 - - - -   10/30/17 1415 148/74 - - 79 - - - -   10/30/17 1400 130/67 - - 87 - - - -    10/30/17 1345 134/72 - - 87 - - - -   10/30/17 1330 139/75 - - 69 - - - -   10/30/17 1315 143/78 - - 68 - - - -   10/30/17 1300 156/81 - - 80 - - - -   10/30/17 1245 142/78 - - 72 - - - -   10/30/17 1230 147/83 - - 83 - - - -   10/30/17 1215 146/82 - - 88 - - - -   10/30/17 1200 173/87 - - 75 - - - -   10/30/17 1146 169/89 - - 73 - 16 - -   10/30/17 1145 163/90 98  F (36.7  C) Oral 76 - 16 96 % -   10/30/17 1123 - - - - - - - 70.4 kg (155 lb 3.3 oz)   10/30/17 1122 150/84 97.9  F (36.6  C) Oral 77 - 16 95 % -       Temp:  [97.4  F (36.3  C)-99.1  F (37.3  C)] 99.1  F (37.3  C)  Pulse:  [68-88] 82  Heart Rate:  [78-88] 88  Resp:  [14-16] 14  BP: (120-173)/(65-90) 120/67  SpO2:  [92 %-96 %] 95 %    Temperatures:  Current - Temp: 99.1  F (37.3  C); Max - Temp  Av.2  F (36.8  C)  Min: 97.4  F (36.3  C)  Max: 99.1  F (37.3  C)  Respiration range: Resp  Avg: 15.1  Min: 14  Max: 16  Pulse range: Pulse  Av.9  Min: 68  Max: 88  Blood pressure range: Systolic (24hrs), Av , Min:120 , Max:173   ; Diastolic (24hrs), Av, Min:65, Max:90    Pulse oximetry range: SpO2  Av.7 %  Min: 92 %  Max: 96 %    I/O last 3 completed shifts:  In: 168 [P.O.:100; I.V.:1585]  Out:  [Other:]      Intake/Output Summary (Last 24 hours) at 10/31/17 1038  Last data filed at 10/31/17 1034   Gross per 24 hour   Intake             1685 ml   Output             2175 ml   Net             -490 ml[JT1.1]       Alert, up in chair  LE no edema, + stasis changes  Lungs with clear BS  Cor RRR nl S1 S2 no M  CVC on R with no redness or tenderness[JT1.2]        Wt Readings from Last 4 Encounters:   10/30/17 70.4 kg (155 lb 3.3 oz)   10/18/17 62.2 kg (137 lb 2 oz)   10/01/17 77.3 kg (170 lb 8 oz)   17 77.1 kg (170 lb)          Data:          Lab Results   Component Value Date     10/31/2017     10/30/2017     10/28/2017    Lab Results   Component Value Date    CHLORIDE 105 10/31/2017    CHLORIDE 111 10/30/2017     CHLORIDE 106 10/28/2017    Lab Results   Component Value Date    BUN 4 10/31/2017    BUN 2 10/30/2017    BUN 4 10/28/2017      Lab Results   Component Value Date    POTASSIUM 3.6 10/31/2017    POTASSIUM 2.7 10/30/2017    POTASSIUM 3.6 10/28/2017    Lab Results   Component Value Date    CO2 27 10/31/2017    CO2 26 10/30/2017    CO2 29 10/28/2017    Lab Results   Component Value Date    CR 2.35 10/31/2017    CR 1.09 10/30/2017    CR 2.15 10/28/2017        Recent Labs   Lab Test  10/27/17   1000  10/26/17   0734  10/24/17   0739   WBC  6.7  5.9  3.6*   HGB  9.2*  8.9*  9.1*   HCT  30.2*  29.4*  30.0*   MCV  91  90  91   PLT  320  310  311     Recent Labs   Lab Test  10/22/17   0305  10/12/17   0456  10/11/17   0420   AST  14  9  12   ALT  9  8  9   ALKPHOS  180*  128  145   BILITOTAL  0.3  0.4  0.3       Recent Labs   Lab Test  10/15/17   0700  10/10/17   0409  10/09/17   0441   MAG  1.8  1.7  2.1     Recent Labs   Lab Test  10/16/17   0545  10/15/17   0700  10/12/17   1600   PHOS  4.9*  3.8  3.9     Recent Labs   Lab Test  10/31/17   0745  10/30/17   1440  10/28/17   0725   MALICK  7.0*  5.6*  7.5*       Lab Results   Component Value Date    MALICK 7.0 (L) 10/31/2017     Lab Results   Component Value Date    WBC 6.7 10/27/2017    HGB 9.2 (L) 10/27/2017    HCT 30.2 (L) 10/27/2017    MCV 91 10/27/2017     10/27/2017     Lab Results   Component Value Date     10/31/2017    POTASSIUM 3.6 10/31/2017    CHLORIDE 105 10/31/2017    CO2 27 10/31/2017     (H) 10/31/2017     Lab Results   Component Value Date    BUN 4 (L) 10/31/2017    CR 2.35 (H) 10/31/2017     Lab Results   Component Value Date    MAG 1.8 10/15/2017     Lab Results   Component Value Date    PHOS 4.9 (H) 10/16/2017       Creatinine   Date Value Ref Range Status   10/31/2017 2.35 (H) 0.52 - 1.04 mg/dL Final   10/30/2017 1.09 (H) 0.52 - 1.04 mg/dL Final   10/28/2017 2.15 (H) 0.52 - 1.04 mg/dL Final   10/27/2017 3.00 (H) 0.52 - 1.04 mg/dL Final    10/26/2017 2.22 (H) 0.52 - 1.04 mg/dL Final   10/24/2017 2.69 (H) 0.52 - 1.04 mg/dL Final       Attestation:  I have reviewed today's vital signs, notes, medications, labs and imaging.     Hank Guerra MD[JT1.1]               Revision History        User Key Date/Time User Provider Type Action    > JT1.2 10/31/2017 10:55 AM Hank Guerra MD Physician Sign     JT1.1 10/31/2017 10:38 AM Hank Guerra MD Physician             Progress Notes by Rashard Zafar MD at 10/31/2017  7:22 AM     Author:  Rashard Zafar MD Service:  Vascular Surgery Author Type:  Physician    Filed:  10/31/2017  7:23 AM Date of Service:  10/31/2017  7:22 AM Creation Time:  10/31/2017  7:22 AM    Status:  Signed :  Rashard Zafar MD (Physician)         Surgery Progress Note    S:  Comfortable   Tolerating diet    O: + BM  Vitals:  BP  Min: 128/65  Max: 173/87  Temp  Av.1  F (36.7  C)  Min: 97.4  F (36.3  C)  Max: 98.7  F (37.1  C)  Pulse  Av.1  Min: 68  Max: 99  I/O last 3 completed shifts:  In: 1685 [P.O.:100; I.V.:1585]  Out: 2000 [Other:2000]    Physical Exam:  WD=A  Abd=soft, active BS      Assessment/Plan: Doing well.   Ileus resolved.  OK home per surgical standpoint                                 Diet as tolerated                                 RTO 2 weeks      Wm. MD Andrade[WO1.1]       Revision History        User Key Date/Time User Provider Type Action    > WO1.1 10/31/2017  7:23 AM Rashard Zafar MD Physician Sign                  Procedure Notes     No notes of this type exist for this encounter.      Progress Notes - Therapies (Notes from 10/29/17 through 17)     No notes of this type exist for this encounter.                                          INTERAGENCY TRANSFER FORM - LAB / IMAGING / EKG / EMG RESULTS   10/22/2017                       [facility] DIALYSIS: [phone]            Unresulted Labs     None         Lab  Results - 3 Days      Basic metabolic panel [[id number]] (Abnormal)  Resulted: 11/01/17 0949, Result status: Final result    Ordering provider: Hank Guerra MD  11/01/17 0909 Resulting lab: [facility]    Specimen Information    Type Source Collected On   Blood  11/01/17 0915          Components       Value Reference Range Flag Lab   Sodium 142 133 - 144 mmol/L  FrStHsLb   Potassium 4.2 3.4 - 5.3 mmol/L  FrStHsLb   Chloride 108 94 - 109 mmol/L  FrStHsLb   Carbon Dioxide 27 20 - 32 mmol/L  FrStHsLb   Anion Gap 7 3 - 14 mmol/L  FrStHsLb   Glucose 144 70 - 99 mg/dL H FrStHsLb   Urea Nitrogen 8 7 - 30 mg/dL  FrStHsLb   Creatinine 2.85 0.52 - 1.04 mg/dL H FrStHsLb   GFR Estimate 16 >60 mL/min/1.7m2 L FrStHsLb   Comment:  Non  GFR Calc   GFR Estimate If Black 20 >60 mL/min/1.7m2 L FrStHsLb   Comment:  African American GFR Calc   Calcium 7.1 8.5 - 10.1 mg/dL L FrStHsLb            INR [[id number]] (Abnormal)  Resulted: 11/01/17 0910, Result status: Final result    Ordering provider: Emmanuel Stuart MD  11/01/17 0001 Resulting lab: [facility]    Specimen Information    Type Source Collected On   Blood  11/01/17 0837          Components       Value Reference Range Flag Lab   INR 2.25 0.86 - 1.14 H FrStHsLb            Glucose by meter [[id number]] (Abnormal)  Resulted: 11/01/17 0826, Result status: Final result    Ordering provider: Cara Fisher MD  11/01/17 0822 Resulting lab: POINT OF CARE TEST, GLUCOSE    Specimen Information    Type Source Collected On     11/01/17 0822          Components       Value Reference Range Flag Lab   Glucose 138 70 - 99 mg/dL H 170            Glucose by meter [[id number]] (Abnormal)  Resulted: 10/31/17 2141, Result status: Final result    Ordering provider: Cara Fisher MD  10/31/17 2135 Resulting lab: POINT OF CARE TEST, GLUCOSE    Specimen Information    Type Source Collected On     10/31/17 7974          Components        Value Reference Range Flag Lab   Glucose 149 70 - 99 mg/dL H 170            Glucose by meter [[id number]] (Abnormal)  Resulted: 10/31/17 1726, Result status: Final result    Ordering provider: Cara Fisher MD  10/31/17 1719 Resulting lab: POINT OF CARE TEST, GLUCOSE    Specimen Information    Type Source Collected On     10/31/17 1719          Components       Value Reference Range Flag Lab   Glucose 144 70 - 99 mg/dL H 170            Glucose by meter [[id number]] (Abnormal)  Resulted: 10/31/17 1121, Result status: Final result    Ordering provider: Cara Fisher MD  10/31/17 1114 Resulting lab: POINT OF CARE TEST, GLUCOSE    Specimen Information    Type Source Collected On     10/31/17 1114          Components       Value Reference Range Flag Lab   Glucose 131 70 - 99 mg/dL H 170            Basic metabolic panel [[id number]] (Abnormal)  Resulted: 10/31/17 0820, Result status: Final result    Ordering provider: Emmanuel Stuart MD  10/31/17 0001 Resulting lab: [facility]    Specimen Information    Type Source Collected On   Blood  10/31/17 0745          Components       Value Reference Range Flag Lab   Sodium 140 133 - 144 mmol/L  FrStHsLb   Potassium 3.6 3.4 - 5.3 mmol/L  FrStHsLb   Chloride 105 94 - 109 mmol/L  FrStHsLb   Carbon Dioxide 27 20 - 32 mmol/L  FrStHsLb   Anion Gap 8 3 - 14 mmol/L  FrStHsLb   Glucose 167 70 - 99 mg/dL H FrStHsLb   Urea Nitrogen 4 7 - 30 mg/dL L FrStHsLb   Creatinine 2.35 0.52 - 1.04 mg/dL H FrStHsLb   GFR Estimate 20 >60 mL/min/1.7m2 L FrStHsLb   Comment:  Non  GFR Calc   GFR Estimate If Black 24 >60 mL/min/1.7m2 L FrStHsLb   Comment:  African American GFR Calc   Calcium 7.0 8.5 - 10.1 mg/dL L FrStHsLb            INR [[id number]] (Abnormal)  Resulted: 10/31/17 0819, Result status: Final result    Ordering provider: Emmanuel Stuart MD  10/31/17 0001 Resulting lab: [facility]    Specimen Information    Type Source Collected  On   Blood  10/31/17 0745          Components       Value Reference Range Flag Lab   INR 2.51 0.86 - 1.14 H FrStHsLb            Glucose by meter [[id number]] (Abnormal)  Resulted: 10/31/17 0221, Result status: Final result    Ordering provider: Cara Fisher MD  10/31/17 0211 Resulting lab: POINT OF CARE TEST, GLUCOSE    Specimen Information    Type Source Collected On     10/31/17 0211          Components       Value Reference Range Flag Lab   Glucose 115 70 - 99 mg/dL H 170            Glucose by meter [[id number]] (Abnormal)  Resulted: 10/30/17 2116, Result status: Final result    Ordering provider: Cara Fisher MD  10/30/17 2111 Resulting lab: POINT OF CARE TEST, GLUCOSE    Specimen Information    Type Source Collected On     10/30/17 2111          Components       Value Reference Range Flag Lab   Glucose 178 70 - 99 mg/dL H 170            Glucose by meter [[id number]] (Abnormal)  Resulted: 10/30/17 1722, Result status: Final result    Ordering provider: Cara Fisher MD  10/30/17 1714 Resulting lab: POINT OF CARE TEST, GLUCOSE    Specimen Information    Type Source Collected On     10/30/17 1714          Components       Value Reference Range Flag Lab   Glucose 111 70 - 99 mg/dL H 170            Basic metabolic panel [[id number]] (Abnormal)  Resulted: 10/30/17 1640, Result status: Final result    Ordering provider: Emmanuel Stuart MD  10/30/17 0000 Resulting lab: [facility]    Specimen Information    Type Source Collected On   Blood  10/30/17 1440          Components       Value Reference Range Flag Lab   Sodium 145 133 - 144 mmol/L H FrStHsLb   Potassium 2.7 3.4 - 5.3 mmol/L L FrStHsLb   Chloride 111 94 - 109 mmol/L H FrStHsLb   Carbon Dioxide 26 20 - 32 mmol/L  FrStHsLb   Anion Gap 8 3 - 14 mmol/L  FrStHsLb   Glucose 93 70 - 99 mg/dL  FrStHsLb   Urea Nitrogen 2 7 - 30 mg/dL L FrStHsLb   Creatinine 1.09 0.52 - 1.04 mg/dL H FrStHsLb   GFR Estimate 49 >60 mL/min/1.7m2 L FrStHsLb    Comment:  Non  GFR Calc   GFR Estimate If Black 59 >60 mL/min/1.7m2 L FrStHsLb   Comment:  African American GFR Calc   Calcium 5.6 8.5 - 10.1 mg/dL LL FrStHsLb   Comment:         Critical Value called to and read back by  Carmen Hernandez on Sta 33 t 1535.SL              INR [[id number]] (Abnormal)  Resulted: 10/30/17 1522, Result status: Final result    Ordering provider: Emmanuel Stuart MD  10/30/17 0000 Resulting lab: [facility]    Specimen Information    Type Source Collected On   Blood  10/30/17 1440          Components       Value Reference Range Flag Lab   INR 2.50 0.86 - 1.14 H FrStHsLb            Glucose by meter [[id number]] (Abnormal)  Resulted: 10/30/17 1136, Result status: Final result    Ordering provider: Cara Fisher MD  10/30/17 1127 Resulting lab: POINT OF CARE TEST, GLUCOSE    Specimen Information    Type Source Collected On     10/30/17 1127          Components       Value Reference Range Flag Lab   Glucose 130 70 - 99 mg/dL H 170            Glucose by meter [[id number]] (Abnormal)  Resulted: 10/30/17 0201, Result status: Final result    Ordering provider: Cara Fisher MD  10/30/17 0153 Resulting lab: POINT OF CARE TEST, GLUCOSE    Specimen Information    Type Source Collected On     10/30/17 0153          Components       Value Reference Range Flag Lab   Glucose 111 70 - 99 mg/dL H 170            Glucose by meter [[id number]] (Abnormal)  Resulted: 10/29/17 2136, Result status: Final result    Ordering provider: Cara Fisher MD  10/29/17 2130 Resulting lab: POINT OF CARE TEST, GLUCOSE    Specimen Information    Type Source Collected On     10/29/17 2130          Components       Value Reference Range Flag Lab   Glucose 142 70 - 99 mg/dL H 170            Glucose by meter [[id number]] (Abnormal)  Resulted: 10/29/17 1716, Result status: Final result    Ordering provider: Cara Fisher MD  10/29/17 1711 Resulting lab: POINT OF CARE TEST, GLUCOSE    Specimen  Information    Type Source Collected On     10/29/17 1711          Components       Value Reference Range Flag Lab   Glucose 175 70 - 99 mg/dL H 170            Glucose by meter [[id number]] (Abnormal)  Resulted: 10/29/17 1136, Result status: Final result    Ordering provider: Cara Fisher MD  10/29/17 1132 Resulting lab: POINT OF CARE TEST, GLUCOSE    Specimen Information    Type Source Collected On     10/29/17 1132          Components       Value Reference Range Flag Lab   Glucose 138 70 - 99 mg/dL H 170            Glucose by meter [[id number]] (Abnormal)  Resulted: 10/29/17 0746, Result status: Final result    Ordering provider: Cara Fisher MD  10/29/17 0740 Resulting lab: POINT OF CARE TEST, GLUCOSE    Specimen Information    Type Source Collected On     10/29/17 0740          Components       Value Reference Range Flag Lab   Glucose 106 70 - 99 mg/dL H 170            Glucose by meter [[id number]] (Abnormal)  Resulted: 10/29/17 0401, Result status: Final result    Ordering provider: Cara Fisher MD  10/29/17 0356 Resulting lab: POINT OF CARE TEST, GLUCOSE    Specimen Information    Type Source Collected On     10/29/17 0356          Components       Value Reference Range Flag Lab   Glucose 101 70 - 99 mg/dL H 170            Glucose by meter [[id number]] (Abnormal)  Resulted: 10/29/17 0051, Result status: Final result    Ordering provider: Cara Fisher MD  10/29/17 0046 Resulting lab: POINT OF CARE TEST, GLUCOSE    Specimen Information    Type Source Collected On     10/29/17 0046          Components       Value Reference Range Flag Lab   Glucose 103 70 - 99 mg/dL H 170            Testing Performed By     Lab - Abbreviation Name Director Address Valid Date Range    14 - FrStHsLb [facility] Unknown [address] [place] [address] 05/08/15 1057 - Present    170 - Unknown POINT OF CARE TEST, GLUCOSE Unknown Unknown 10/31/11 1114 - Present            Encounter-Level Documents:     There are no  encounter-level documents.      Order-Level Documents:     There are no order-level documents.

## 2017-10-22 NOTE — IP AVS SNAPSHOT
"` [place] [address] [facility]: [phone]                                              INTERAGENCY TRANSFER FORM - NURSING   10/22/2017                    Hospital Admission Date: 10/22/2017  MARI HERNANDEZ   : 1940  Sex: Female        Attending Provider: Cara Fisher MD     Allergies:  Ciprofloxacin, Oxycodone, Lisinopril, Sulfa Drugs, Penicillins    Infection:  None   Service:  HOSPITALIST    Ht:  1.626 m (5' 4\")   Wt:  70.4 kg (155 lb 3.3 oz)   Admission Wt:  65.8 kg (145 lb)    BMI:  26.64 kg/m 2   BSA:  1.78 m 2            Patient PCP Information     Provider PCP Type    Dheeraj Jj MD General      Current Code Status     Date Active Code Status Order ID Comments User Context       Prior      Code Status History     Date Active Date Inactive Code Status Order ID Comments User Context    10/31/2017  1:11 PM  Full Code [id number]  EugenioEmmanuel montalvo MD Outpatient    10/22/2017 11:25 AM 10/31/2017  1:11 PM Full Code [id number]  Cara Fisher MD Inpatient    10/18/2017  9:22 AM 10/22/2017 11:25 AM Full Code [id number]  Hank Babcock MD Outpatient    10/2/2017  3:17 AM 10/18/2017  9:22 AM Full Code [id number]  Beth Laird MD Inpatient    2017 10:58 PM 9/15/2017  7:27 PM Full Code [id number]  Mike Tadeo MD Inpatient    2017 11:47 AM 2017 10:58 PM Full Code [id number]  Mike Tadeo MD Outpatient    2017  9:59 AM 2017 11:47 AM Full Code [id number]  Cameron Burrell MD Inpatient    2017 11:32 AM 2017  9:59 AM Full Code [id number]  L[place] Pierre MD Outpatient    2017  4:31 PM 2017 11:32 AM Full Code [id number]  Gilda Muro MD Inpatient    2017  3:54 PM 2017  4:31 PM Full Code [id number]  Damian Contreras MD Outpatient    2017 10:16 PM 2017  3:54 PM Full Code [id number]  Maegan Xavier MD Inpatient    2017  2:14 PM 2017  3:28 PM Full Code [id number]  Erin Pinedo " MD ANGELA Inpatient    6/15/2017  4:01 PM 6/21/2017  2:14 PM Full Code [id number]  Arash Silva MD Outpatient    6/12/2017  8:17 PM 6/15/2017  4:01 PM Full Code [id number]  Mike Wong MD Inpatient    6/6/2017  1:12 PM 6/12/2017  8:17 PM Full Code [id number]  Kenji Fish MD Outpatient    5/31/2017  9:20 PM 6/6/2017  1:12 PM Full Code [id number]  Mike Wong MD Inpatient    5/2/2017 10:00 AM 5/31/2017  9:20 PM Full Code [id number]  Jian Hartley MD Outpatient    4/28/2017  7:16 PM 5/2/2017 10:00 AM Full Code [id number]  Jian Hartley MD Inpatient    7/6/2016  1:46 PM 4/28/2017  7:16 PM Full Code [id number]  Isadora Mosley PA-C Outpatient    7/6/2016 10:41 AM 7/6/2016  1:46 PM Full Code [id number]  Arash Silva MD Outpatient    7/3/2016 11:12 AM 7/6/2016 10:41 AM Full Code [id number]  Isadora Mosley PA-C Inpatient    7/2/2016  5:08 AM 7/3/2016 11:12 AM Full Code [id number]  Mike Wong MD Inpatient    3/23/2015  7:40 PM 3/25/2015  1:40 PM Full Code [id number]  Luisa Adan MD Inpatient    12/28/2014  9:42 AM 3/23/2015  7:40 PM Full Code [id number]  Jian Hartley MD Outpatient    12/27/2014  4:26 PM 12/28/2014  9:42 AM Full Code [id number]  Mick Dale MD ED    12/9/2014  2:02 PM 12/27/2014  4:26 PM Full Code [id number]  Jian Hartley MD Outpatient    11/13/2014 11:04 AM 12/9/2014  2:02 PM Full Code [id number]  Jian Hartley MD Outpatient    11/10/2014  6:44 PM 11/13/2014 11:04 AM Full Code [id number]  Mike Wong MD Inpatient    9/14/2014  1:12 PM 11/10/2014  6:44 PM Full Code [id number]  Jian Hartley MD Outpatient    9/12/2014  1:02 PM 9/14/2014  1:12 PM Full Code [id number]  Radha Field PA-C Inpatient    7/20/2014  9:02 AM 9/12/2014  1:02 PM Full Code [id number]  Cesar Lafleur MD Outpatient    7/15/2014  5:49 PM 7/20/2014  9:02 AM Full Code [id number]  Radha Field PA-C Inpatient    2/22/2014  4:21 AM  2/22/2014  2:33 PM Full Code [id number]  Mike Ogden MD ED    1/17/2014  8:18 PM 1/18/2014 12:34 PM Full Code [id number]  Jusdon Gan RN Inpatient    2/1/2013  2:01 PM 1/17/2014  8:18 PM Full Code [id number]  Nadira Mccann MD Outpatient    2/1/2013 12:18 AM 2/1/2013  2:01 PM Full Code [id number]  Rajinder Dwyer MD Inpatient    1/31/2013  9:39 PM 2/1/2013 12:18 AM Full Code [id number]  Arash Silva MD Outpatient    1/31/2013  7:09 PM 1/31/2013  9:39 PM Full Code [id number]  Radha Field PA-C Inpatient    7/10/2008  2:44 PM 1/31/2013  7:09 PM None None HEALTH CARE DIRECTIVE ON FILE 7-3-08 Melina Art      Advance Directives        Does patient have a scanned Advance Directive/ACP document in EPIC?           Yes        Hospital Problems as of 11/1/2017              Priority Class Noted POA    ANABEL on CPAP Medium  Unknown Yes    Restless leg syndrome Low  Unknown Yes    Esophageal reflux Medium  3/28/2006 Yes    CAD - NSTEMI, CABG x 5 in 2006, angio 2013 patent grafts except occluded graft to PL Medium  2/4/2013 Yes    CRF (chronic renal failure), stage 4 (severe) (H) Medium  3/23/2015 Yes    Essential hypertension with goal blood pressure less than 140/90 Medium  9/13/2016 Yes    Acute on chronic renal failure -- Dialysis started 10/2017 Medium  6/12/2017 Yes    Anemia in chronic kidney disease Medium  9/20/2017 Yes    Paroxysmal atrial fibrillation -- on Warfarin Medium  10/20/2017 Yes    * (Principal)SBO -- S/P Lysis of Adhes 10/24/17 Medium  10/22/2017 Yes    Postoperative ileus (H) Medium  10/28/2017 Unknown    DM type 2 -- Hgb A1C 6.6 on 10/13/17 Medium  10/29/2017 Unknown      Non-Hospital Problems as of 11/1/2017              Priority Class Noted    Lipoma of other skin and subcutaneous tissue Medium  9/7/2004    Iron deficiency anemia Medium  10/17/2007    History of peptic ulcer disease Low  10/17/2007    Kidney stone Medium  12/29/2009    Hyperlipidemia LDL goal <100 Medium   10/31/2010    Advanced directives, counseling/discussion Low  11/12/2012    Health Care Home Medium  1/20/2014    Lymphedema Medium  11/10/2014    Osteoarthritis of hip Medium  4/29/2015    Pulmonary hypertension Medium  5/24/2017    Chronic heart failure (H) with preserved EF Medium  5/31/2017    Atrial flutter (H) - present 6/12 Medium  5/31/2017    Hypertensive heart and chronic kidney disease with heart failure and stage 1 through stage 4 chronic kidney disease, or unspecified chronic kidney disease Medium  6/1/2017    Proteinuria 2.1 g/g Cr Medium  6/2/2017    Bradycardia Medium  6/2/2017    Memory loss Medium  6/12/2017    Blood in stool Medium  6/14/2017    Microscopic hematuria Medium  6/21/2017    CHF (congestive heart failure) (H) Medium  7/6/2017    Anemia, iron deficiency Medium  8/9/2017    Diarrhea, unspecified type Medium  10/20/2017    Anxiety Medium  10/20/2017      Immunizations     Name Date      HEPA 10/13/98     HEPA 03/11/98     HepB 09/25/00     HepB 10/19/99     HepB 08/10/99     Influenza (High Dose) 3 valent vaccine 09/21/17     Influenza (High Dose) 3 valent vaccine 09/13/16     Influenza (High Dose) 3 valent vaccine 09/23/15     Influenza (High Dose) 3 valent vaccine 09/24/14     Influenza (High Dose) 3 valent vaccine 12/06/13     Influenza (High Dose) 3 valent vaccine 09/25/12     Influenza (IIV3) 10/01/10     Influenza (IIV3) 10/21/09     Influenza (IIV3) 12/03/08     Influenza (IIV3) 12/06/07     Influenza (IIV3) 04/02/07     Influenza (IIV3) 11/29/06     Influenza (IIV3) 11/03/05     Influenza (IIV3) 10/29/04     Influenza (IIV3) 11/10/03     Influenza (IIV3) 10/16/02     Influenza (IIV3) 11/16/01     Meningococcal (Menomune ) 04/02/07     Pneumococcal (PCV 13) 09/13/16     Pneumococcal 23 valent 11/17/14     Pneumococcal 23 valent 12/03/08     Poliovirus, inactivated (IPV) 04/02/07     TD (ADULT, 7+) 10/31/10     TD (ADULT, 7+) 08/07/00     TD (ADULT, 7+) 06/20/96     Typhoid IM  04/02/07     Yellow Fever 04/02/07     Zoster vaccine, live 11/01/11          END      ASSESSMENT     Discharge Profile Flowsheet     EXPECTED DISCHARGE     COMMUNICATION ASSESSMENT      Expected Discharge Date  10/31/17 (TCU) 10/30/17 1538   Patient's communication style  spoken language (English or Bilingual) 10/22/17 0245    DISCHARGE NEEDS ASSESSMENT     FINAL RESOURCES      Concerns To Be Addressed  compliance issue concerns 10/23/17 1209   Resources List  Home Care 10/23/17 1209    Concerns Comments  ? new HD, INR next check 10/23/17 1209   Other Resources  Home Care 10/23/17 1209    Equipment Currently Used at Home  walker, rolling;walker, standard;other (see comments);dressing device;raised toilet;grab bar;shower chair (Scooter, toilet safety frame, reacher) 10/26/17 1510   PAS Number  [id number] 10/23/17 1209    Transportation Available  car;family or friend will provide 10/26/17 1522   Senior Linkage Line Referral Placed  07/02/16 10/23/17 1209    # of Referrals Placed by CTS  Post Acute Facilities 10/23/17 1155   F/U Appointment Brochure Provided  07/02/16 10/23/17 1209    Key Recommendations  TBD after surgery 10/06/16 1312   Existing Resources/Services  Home Care 11/18/14 1451    Does Patient Need a Referral for Clinic CC  No 03/24/15 1445   SKIN      Equipment Used at Home  walker, rolling 07/15/14 1946   Inspection of bony prominences  Full except (identify areas not inspected) 11/01/17 1011    GASTROINTESTINAL (ADULT,PEDIATRIC,OB)     Inspection under devices  Full 11/01/17 1011    GI WDL  WDL 11/01/17 0950   Skin WDL  ex 11/01/17 1011    Abdominal Appearance  rounded 10/31/17 1741   Skin Color/Characteristics  kailash 11/01/17 1011    All Quadrants Bowel Sounds  audible and active in all quadrants 11/01/17 1011   Skin Temperature  warm 11/01/17 1011    LUQ Bowel Sounds  audible and active in all quadrants 10/28/17 0953   Skin Moisture  dry 11/01/17 1011    RUQ Bowel Sounds  audible and active in all  "quadrants 10/28/17 0953   Skin Integrity  wound(s);scab(s) 11/01/17 1011    LLQ Bowel Sounds  audible and active in all quadrants 10/28/17 0953   Procedural focused assessment (identify areas inspected)   Abdomen 11/01/17 1011    RLQ Bowel Sounds  audible and active in all quadrants 10/28/17 0953   Skin Elasticity  quick return to original state 10/31/17 1741    All Quadrants Palpation  soft/nontender 10/27/17 1313   Full except areas not inspected   Sacrum;Buttock, left;Buttock, right 11/01/17 1011    Last Bowel Movement  10/31/17 10/31/17 1741   SAFETY      GI Signs/Symptoms  constipation;passing flatus 10/29/17 1036   Safety WDL  WDL 11/01/17 0950    Passing flatus  yes 11/01/17 1011   All Alarms  alarm(s) activated and audible 11/01/17 0950                 Assessment WDL (Within Defined Limits) Definitions           Safety WDL     Effective: 09/28/15    Row Information: <b>WDL Definition:</b> Bed in low position, wheels locked; call light in reach; upper side rails up x 2; ID band on<br> <font color=\"gray\"><i>Item=AS safety wdl>>List=AS safety wdl>>Version=F14</i></font>      Skin WDL     Effective: 09/28/15    Row Information: <b>WDL Definition:</b> Warm; dry; intact; elastic; without discoloration; pressure points without redness<br> <font color=\"gray\"><i>Item=AS skin wdl>>List=AS skin wdl>>Version=F14</i></font>      Vitals     Vital Signs Flowsheet     VITAL SIGNS     Side Effects Monitoring: Sedation Level  1 10/31/17 1517    Temp  97.9  F (36.6  C) 11/01/17 1219   HEIGHT AND WEIGHT      Temp src  Oral 11/01/17 1219   Height  1.626 m (5' 4\") 10/24/17 2018    Resp  16 11/01/17 1219   Height Method  Stated 10/22/17 0248    Pulse  78 10/31/17 1906   Weight  70.4 kg (155 lb 3.3 oz) 10/30/17 1125    Heart Rate  74 11/01/17 1220   Weight Method  Standing scale 10/30/17 0729    Pulse/Heart Rate Source  Monitor 11/01/17 0818   Bed Scale  Standard (fitted sheet, draw sheet/ pad, cover/flat sheet, blanket, two " "pillows) 10/25/17 0729    BP  170/80 11/01/17 1220   BRIANNE COMA SCALE      BP Location  Left arm 11/01/17 0818   Best Eye Response  4-->(E4) spontaneous 10/22/17 0337    OXYGEN THERAPY     Best Motor Response  6-->(M6) obeys commands 10/22/17 0337    SpO2  95 % 11/01/17 0950   Best Verbal Response  5-->(V5) oriented 10/22/17 0337    O2 Device  None (Room air) 11/01/17 0950   [place] Coma Scale Score  15 10/22/17 0337    Oxygen Delivery  1 LPM 10/28/17 0731   POSITIONING      PAIN/COMFORT     Body Position  independently positioning 11/01/17 1011    Patient Currently in Pain  denies 11/01/17 0821   Head of Bed (HOB)  HOB at 30 degrees 11/01/17 0518    Preferred Pain Scale  number (Numeric Rating Pain Scale) 10/24/17 1351   Positioning/Transfer Devices  pillows;in use 11/01/17 0518    Patient's Stated Pain Goal  1 (i WANT AS LITTLE PAIN AS POSSIBLE, PLEASE USE POST-OP MEDS GENEROUSLY\") 10/24/17 1351   Chair  Upright in chair 11/01/17 1011    0-10 Pain Scale  0 10/25/17 1307   DAILY CARE      FACES Pain Rating  2-->hurts little bit 10/22/17 0934   Activity Management  activity encouraged 11/01/17 1011    Pain Location  Abdomen 10/24/17 2219   Activity Assistance Provided  assistance, 1 person 11/01/17 1011    Pain Orientation  Lower 10/24/17 2018   Assistive Device Utilized  walker 11/01/17 0518    Pain Descriptors  Cramping 10/24/17 2018   Additional Documentation  Activity Device Assistance (Row) 10/22/17 1628    Pain Intervention(s)  Declines 10/31/17 0321   ECG      Response to Interventions  Absence of nonverbal indicators of pain 10/30/17 0447   Equipment  electrodes changed;telemetry batteries changed 10/24/17 1126    ANALGESIA SIDE EFFECTS MONITORING     ECG Rhythm  Normal sinus rhythm 11/01/17 1219    Side Effects Monitoring: Respiratory Quality  R 10/31/17 1517   Ectopy  None 10/24/17 1853    Side Effects Monitoring: Respiratory Depth  N 10/31/17 1517   Lead Monitored  Lead II 10/24/17 1853          "   Patient Lines/Drains/Airways Status    Active LINES/DRAINS/AIRWAYS     Name: Placement date: Placement time: Site: Days: Last dressing change:    Peripheral IV 10/28/17 Left;Posterior Lower forearm 10/28/17   0424   Lower forearm   4     Pressure Injury 10/13/17 Coccyx Blanchable redness, intact 10/13/17   1500    18     Wound 04/28/17 Lateral;Left Hip Surgical from previous surgery 04/28/17   1542   Hip   186     Wound 04/28/17 Lower;Left Leg Other (comment) scab dime size LLE 04/28/17   1630   Leg   186     Wound 05/31/17 Left Calf Other (comment) open blisters  05/31/17   2011   Calf   153     Wound 06/12/17 Posterior;Right Calf Ulceration 06/12/17   2100   Calf   141     Wound 06/21/17 Left Calf Abrasion(s) 06/21/17   1531   Calf   132     Wound 07/06/17 Anterior Leg Abrasion(s) quarter sized, weaping 07/06/17   2028   Leg   117     Wound 08/12/17 Bilateral Leg Ulceration;Other (comment)  scattered open ulcerations on lower legs bilateral posterior and anterior  08/12/17   1901   Leg   80     Wound 08/12/17 Bilateral Labia excoration, skin intact 08/12/17   1600   Labia   80     Rash 05/31/17 2013 Left breast patch 05/31/17   2013    153     Incision/Surgical Site 10/24/17 Abdomen 10/24/17   1809    7             Patient Lines/Drains/Airways Status    Active PICC/CVC     Name: Placement date: Placement time: Site: Days: Additional Info Last dressing change:    CVC Double Lumen Right           Description : Tunneled            Orientation: Right               Intake/Output Detail Report     Date Intake           Output         Net    Shift P.O. I.V. Other NG/GT IV Piggyback Colloid Total Urine Emesis/NG output Other Stool Blood Total       Noc 10/30/17 2300 - 10/31/17 0659 100 430 -- -- -- -- 530 -- -- -- -- -- -- 530    Day 10/31/17 0700 - 10/31/17 1459 -- 85.4 -- -- -- -- 85.4 175 -- -- 600 -- 775 -689.6    Janki 10/31/17 1500 - 10/31/17 2259 240 -- -- -- -- -- 240 -- -- 350 -- -- 350 -110    Noc 10/31/17 5661  - 11/01/17 0659 -- -- -- -- -- -- -- 100 -- -- -- -- 100 -100    Day 11/01/17 0700 - 11/01/17 1459 -- -- -- -- -- -- -- -- -- 3000 -- -- 3000 -3000      Last Void/BM       Most Recent Value    Urine Occurrence 1 at 10/31/2017 0428    Stool Occurrence 1 at 10/31/2017 0850      Case Management/Discharge Planning     Case Management/Discharge Planning Flowsheet     REFERRAL INFORMATION     DISCHARGE PLANNING      Did the Initial Social Work Assessment result in a Social Work Case?  Yes 10/23/17 1156   Transportation Available  car;family or friend will provide 10/26/17 1522    Admission Type  inpatient 10/23/17 1156   Key Recommendations  TBD after surgery 10/06/16 1312    Arrived From  home or self-care;home healthcare service 06/13/17 1417   Does Patient Need a Referral for Clinic CC  No 03/24/15 1445    # of Referrals Placed by CTS  Post Acute Facilities 10/23/17 1155   Equipment Used at Home  walker, rolling 07/15/14 1946    Post Acute Facilities  TCU 10/23/17 1156   FINAL RESOURCES      Reason For Consult  discharge planning 10/23/17 1156   Equipment Currently Used at Home  walker, rolling;walker, standard;other (see comments);dressing device;raised toilet;grab bar;shower chair (Scooter, toilet safety frame, reacher) 10/26/17 1510    CTS Assigned to Case  dagoberto schafer 10/23/17 1156   Resources List  Home Care 10/23/17 1209    Primary Care Clinic Name  [facility] 06/01/17 1318   Other Resources  Home Care 10/23/17 1209    Primary Care MD Name  [facility] 06/01/17 1318   PAS Number  [id number] 10/23/17 1209    LIVING ENVIRONMENT     Senior Linkage Line Referral Placed  07/02/16 10/23/17 1209    Lives With  friend(s);spouse;grandchild(zoe) 10/26/17 1510   F/U Appointment Brochure Provided  07/02/16 10/23/17 1209    Living Arrangements  house 10/26/17 1510   Existing Resources/Services  Home Care 11/18/14 1451    COPING/STRESS     ABUSE RISK SCREEN      Major Change/Loss/Stressor  illness;hospitalization 10/22/17 1056    QUESTION TO PATIENT:  Has a member of your family or a partner(now or in the past) intimidated, hurt, manipulated, or controlled you in any way?  no 10/22/17 0249    Patient Personal Strengths  motivated;resilient;expressive of emotions;expressive of needs 06/13/17 1412   QUESTION TO PATIENT: Do you feel safe going back to the place where you are living?  yes 10/22/17 0249    EXPECTED DISCHARGE     OBSERVATION: Is there reason to believe there has been maltreatment of a vulnerable adult (ie. Physical/Sexual/Emotional abuse, self neglect, lack of adequate food, shelter, medical care, or financial exploitation)?  no 10/22/17 0249    Expected Discharge Date  10/31/17 (TCU) 10/30/17 1538   (R) MENTAL HEALTH SUICIDE RISK      ASSESSMENT/CONCERNS TO BE ADDRESSED     Are you depressed or being treated for depression?  Yes 10/22/17 1056    Concerns To Be Addressed  compliance issue concerns 10/23/17 1209   HOMICIDE RISK      Concerns Comments  ? new HD, INR next check 10/23/17 1209   Feels Like Hurting Others  no 10/22/17 0249

## 2017-10-22 NOTE — H&P
PRIMARY CARE PHYSICIAN:  Dr. Dheeraj Jj      CHIEF COMPLAINT:  Abdominal pain and distention.      HISTORY OF PRESENT ILLNESS:  Ms. Kathryn Banks is a 77-year-old female with a recent prolonged hospitalization with multiple medical issues due to acute hypoxic respiratory failure secondary to pneumonia and pulmonary edema and complicated by acute renal failure needing dialysis, history of atrial fibrillation with RVR, followed closely by Cardiology during that hospitalization.  She was on BiPAP for respiratory failure and got discharged on 10/18 on high flow nasal cannula to a TCU in [place].  She was also diagnosed with C. diff.  On 09/24, her C. diff returned positive and she completed a course of antibiotics for her pneumonia during that hospitalization and also was treated for the C. diff.  On recheck C. diff on 10/02 was negative, but she was having diarrhea during that hospitalization at the time of discharge.  She also had issues with acute encephalopathy secondary to ICU delirium and sepsis during that hospitalization.  She got discharged on 10/18.  The patient tells me that she started having abdominal pain on 10/19 and it has been getting worse since then.  She has not been having any bowel movements and she was nauseated and retching, but no liane vomitus.  She tells me that her lower abdomen is the most painful, especially the left lower quadrant associated with distention, so she was brought into the emergency room today and was seen by Dr. Samaniego.  CT scan of the abdomen and pelvis without contrast was performed due to her end-stage renal disease.  The CT abdomen and pelvis showed a collection of fluid-filled mildly dilated small bowel in the inferior pelvis with associated edema within the mesentery.  The afferent and efferent segments of the bowel associated with this collection of bowel are focally narrowed in a similar location.  These findings are consistent with this closed loop bowel  obstruction.  The more proximal jejunum and EMR are fluid filled and moderately dilated, measuring up to 4 cm in diameter.  The more distal ileum and colon are relatively decompressed.  General Surgery was called because of the bowel obstruction and they recommended NG tube placement and decompression and to hold her Coumadin.  They are planning on taking her to the OR tomorrow afternoon.  She was given 4 mg of morphine in the emergency room and she feels that helped with her pain, but she is still uncomfortable, lying in bed when I saw her.  She was also given a dose of Zosyn due to unclear reasons in the emergency room and a request for hospitalization was made.      PAST MEDICAL HISTORY:   1.  Acute hypoxic respiratory failure due to pneumonia and pulmonary edema needing BiPAP with prolonged hospital stay from 10/02-10/18.  She was on high flow nasal cannula at the time of discharge.  Her respiratory status is much better currently and she is on room air.  She also required thoracocentesis with 2 liters of fluid removal during that hospitalization.    2.  End-stage renal disease on dialysis, newly started during last hospitalization.   3.  Septic shock due to the pneumonia.   4.  Atrial fibrillation, followed closely by Cardiology and is on amiodarone and anticoagulation with Coumadin.   5.  History of C. diff, was positive on 09/24.  When C. diff was rechecked, it returned negative on 10/02 when she was having ongoing diarrhea at the time of discharge.   6.  History of CABG in 2007.   7.  History of ablation for atrial fibrillation in 2007.   8.  Diabetes mellitus type 2 with hemoglobin A1c of 7.6 most recently.   9.  History of acute encephalopathy secondary to ICU delirium during last hospitalization.   10.  Obstructive sleep apnea, uses CPAP.   11.  Osteoarthritis.   12.  Restless legs syndrome.   13.  Tenosynovitis of the hand in 06/2006.   14.  Hypertension.      ALLERGIES:  Ciprofloxacin, oxycodone,  lisinopril and sulfa drugs.      PRIOR TO ADMISSION MEDICATIONS:  They are currently reviewed by pharmacy, so I will review them after their confirmation in Epic.        SOCIAL HISTORY:  She quit smoking 48 years ago.  Before that, she smoked for 10 years.  Does not drink alcohol.  She is currently at a TCU in [place].  Does not use any illicit drugs.      FAMILY HISTORY:  Reviewed and father had diabetes, Parkinson's disease.      REVIEW OF SYSTEMS:  Ten-point review of systems was done and is negative except as in HPI.      PHYSICAL EXAMINATION:   VITAL SIGNS:  Her temperature is 98.6 degrees Fahrenheit, heart rate is 89, blood pressure is ranging from 80-90s, respiratory rate is 16.  She is satting 99% on room air.   GENERAL:  She is an alert, oriented female who appears uncomfortable with NG tube in place.   HEENT:  Head is atraumatic, normocephalic.  Oropharyngeal exam showed moist mucous membranes.   HEART:  S1, S2 heard, no murmurs, rubs or gallops.   LUNGS:  Clear to auscultation.  No rales, rhonchi or wheezing.   ABDOMEN:  Distended, bowel sounds diminished, diffusely tender.  No guarding, rigidity or rebound.   EXTREMITIES:  Bilateral venous stasis changes of the skin with no pitting edema noticed.   NEUROLOGIC:  No focal weakness.   PSYCHIATRIC:  Mood and affect are normal.      LABORATORY AND RADIOLOGICAL INVESTIGATIONS:  His CBC showed WBC count of 7.4, hemoglobin 10.4, platelet count at 357.  Lipase 124.  Sodium 136, potassium 4.1, chloride 96, bicarbonate 30, anion gap of 10, glucose 110, BUN 21, creatinine 3.91, alkaline phosphatase was at 180, ALT is normal at 9, AST is normal at 14.  Albumin is low at 2.5, total bilirubin is normal at 0.3.  INR is supratherapeutic at 3.19.        CT abdomen and pelvis without contrast showed a closed loop small-bowel obstruction.      ASSESSMENT AND PLAN:  A 77-year-old female with recent prolonged hospitalization with acute hypoxic respiratory failure secondary  to pneumonia and pulmonary edema and complicated by acute renal failure needing dialysis, history of atrial fibrillation, history of coronary artery disease status post coronary artery bypass graft several years ago.  History of atrial flutter, status post ablation in 2007.  History of Clostridium difficile presenting with:   1.  Small-bowel obstruction.  General Surgery has been consulted.  A nasogastric tube has been placed for decompression.  Supportive treatment with gentle hydration with intravenous normal saline at 60 mL per hour and analgesia and antiemetics will be provided to her.  We will hold her Coumadin for now and General Surgery is planning on doing conservative management.  If there is no improvement in the next 24-48 hours, she will need to undergo surgery, so will hold the Coumadin for now in anticipation of the procedure. history of pneumonia and respiratory failure, improved.   2.  History of pneumonia and respiratory failure, improved.  She is currently on room air, so will watch her respiratory status closely and fluid status closely, especially with her needing dialysis and so she will be hydrated gently with normal saline at 60 an hour.     3.  Acute kidney injury, started on dialysis during last hospitalization.  Nephrology will be consulted regarding management of dialysis.  She is getting dialyzed Monday, Wednesday, Friday.   4.  Atrial fibrillation with rapid ventricular response during last hospitalization.  We will hold her Coumadin for now.  Will hold her oral metoprolol and will treat her with intravenous metoprolol 5 mg q.6 h. She is also on amiodarone 200 mg a day so will give her intravenous amiodarone 100 mg daily.   5.  History of restless legs syndrome.  Will hold the pramipexole.   6.  Diabetes mellitus type 2 with her being nothing by mouth.  Will use sliding scale insulin medium dose with NovoLog.  Blood sugar checks q. 4 h.   7.  History of obstructive sleep apnea.  We will  order CPAP.   8.  Deep venous thrombosis prophylaxis with pneumatic compression devices and her Coumadin will be on hold.   9.  Gastrointestinal prophylaxis with intravenous Protonix.   10.  Code status was discussed with the patient and she wants to be full code.      She will be admitted as an inpatient.  I am anticipating more than a 2 night stay.         SHERRI PEREZ MD             D: 10/22/2017 07:55   T: 10/22/2017 08:50   MT: [id number]      Name:     MARI [place]   MRN:      [id number]        Account:      [id number]   :      1940           Admitted:     [id number]      Document: [id number]       cc: Dheeraj Jj MD

## 2017-10-22 NOTE — IP AVS SNAPSHOT
[facility]    [address] [place] [address]    Phone:  [phone]                                       After Visit Summary   10/22/2017    Kathryn Banks    MRN: [id number]           After Visit Summary Signature Page     I have received my discharge instructions, and my questions have been answered. I have discussed any challenges I see with this plan with the nurse or doctor.    ..........................................................................................................................................  Patient/Patient Representative Signature      ..........................................................................................................................................  Patient Representative Print Name and Relationship to Patient    ..................................................               ................................................  Date                                            Time    ..........................................................................................................................................  Reviewed by Signature/Title    ...................................................              ..............................................  Date                                                            Time

## 2017-10-22 NOTE — PHARMACY-ADMISSION MEDICATION HISTORY
Admission medication history interview status for the 10/22/2017  admission is complete. See EPIC admission navigator for prior to admission medications     Medication history source reliability:Good    Actions taken by pharmacist (provider contacted, etc):None     Additional medication history information not noted on PTA med list - Changes made per nursing home active medication list (no times of last doses listed):    1. Medications added: Mylanta, Kayexalate, zofran  2. Medications deleted: Metoprolol 25mg BID  3. Medications modified: Warfarin dose updated.     Medication reconciliation/reorder completed by provider prior to medication history? Yes    Time spent in this activity: 20 minutes    Prior to Admission medications    Medication Sig Last Dose Taking? Auth Provider   alum & mag hydroxide-simethicone (MYLANTA ES/MAALOX  ES) 400-400-40 MG/5ML SUSP suspension Take 10 mLs by mouth every 6 hours as needed for indigestion Take for 3 days starting on 10/21/17. prn at prn Yes Unknown, Entered By History   WARFARIN SODIUM PO Take 3.5 mg by mouth daily 10/21/2017 at pm Yes Unknown, Entered By History   sodium polystyrene (KAYEXALATE) 15 GM/60ML suspension Take 30 g by mouth Take as a single dose directed for emergency use only as directed by nephrologist. prn at prn Yes Unknown, Entered By History   ondansetron (ZOFRAN ODT) 4 MG ODT tab Take 4 mg by mouth every 6 hours as needed for nausea prn at prn Yes Unknown, Entered By History   AMIODARONE HCL PO Take 200 mg by mouth 2 times daily  10/21/2017 at Unknown Yes Reported, Patient   nystatin (MYCOSTATIN) 249209 UNIT/GM POWD Apply topically 2 times daily as needed  prn at prn Yes Reported, Patient   cholecalciferol 5000 UNITS CAPS Take 1 capsule (5,000 Units) by mouth daily 10/21/2017 at Unknown time Yes Hank Babcock MD   clonazePAM (KLONOPIN) 0.5 MG tablet Take 0.5 tablets (0.25 mg) by mouth 2 times daily as needed for anxiety 10/21/2017 at Unknown  time Yes Hank Babcock MD   darbepoetin hira (ARANESP, ALBUMIN FREE,) 60 MCG/0.3ML injection Inject 0.3 mLs (60 mcg) Subcutaneous every 14 days As needed for hgb<10g/dL.  If Hgb increases >1 point in 2 weeks (if blood transfusion given, use hgb PRIOR to this), SYSTOLIC BP > 180 mmHg or hgb>=10g/dL, HOLD DOSE. Dose must be within 1 week of Hgb.  Per anemia protocol with Vibha Barfield MD/Yesy Samaniego,PharmD [phone] prn at prn Yes Vibha Barfield MD   pramipexole (MIRAPEX) 0.125 MG tablet Take 1 tablet (0.125 mg) by mouth 3 times daily 10/21/2017 at Unknown time Yes Mike Irene MD   fluticasone (FLONASE) 50 MCG/ACT spray Spray 1 spray into both nostrils daily 10/21/2017 at Unknown time Yes Mike Tadeo MD   Calcium Carb-Cholecalciferol 500-400 MG-UNIT TABS Take 1 tablet by mouth 2 times daily 10/21/2017 at Unknown time Yes Unknown, Entered By History   ipratropium - albuterol 0.5 mg/2.5 mg/3 mL (DUONEB) 0.5-2.5 (3) MG/3ML neb solution Take 1 vial by nebulization 3 times daily 10/21/2017 at Unknown time Yes Unknown, Entered By History   ACETAMINOPHEN PO Take 650 mg by mouth every 4 hours as needed for pain For pain 1-5 out of 10 prn at prn Yes Reported, Patient   nitroGLYcerin (NITROSTAT) 0.4 MG sublingual tablet Place 1 tablet (0.4 mg) under the tongue every 5 minutes as needed for chest pain prn at prn Yes Yareli Lorenzo MD   pravastatin (PRAVACHOL) 40 MG tablet Take 1 tablet (40 mg) by mouth daily 10/21/2017 at Unknown Yes Yareli Lorenzo MD   calcium acetate (PHOSLO) 667 MG CAPS capsule Take 1 capsule (667 mg) by mouth 3 times daily (with meals) 10/21/2017 at Unknown time Yes Yareli Lorenzo MD   pentoxifylline (TRENTAL) 400 MG CR tablet Take 1 tablet (400 mg) by mouth daily 10/21/2017 at Unknown time Yes Yareli Lorenzo MD   Lactobacillus (ACIDOPHILUS) tablet Take 1 tablet by mouth daily 10/21/2017 at Unknown time Yes [place] Pierre MD   ORDER FOR  DME Equipment being ordered: cpap machine, mask, humidifier, tubing and filters   Dheeraj Jj MD

## 2017-10-22 NOTE — CONSULTS
General Surgery [facility] Consultation/H&P    Kathryn Banks MRN#: [id number]   Age: 77 year old YOB: 1940     Date of Admission:          10/22/2017  Reason for consult/H&P: Abdominal pain   Surgeon:      Dheeraj Perez MD                  Chief Complaint:   Abdominal pain         History of Present Illness:   This patient is a 77 year old  female who presented to the [facility] ER with abdominal pain.  This developed over two days.  She was recently in the hospital for pneumonia.  She went home four days ago. She came to the emergency room with abdominal pain which is now largely resolved.  She is not passing flatus or BM today.  She is burping.  NG tube was placed with minimal drainage.  She says the pain was in the lower abdomen but is not present anymore.   She thinks she had a gastric bypass at [facility] about eight years ago and lost 100 pounds.  She also had a hysterectomy in the past.  She feels weak and says she has not eaten for three days.  She does not know if she can stand up. Denies fever, chills,change in urination. No trauma to the abdomen  Denies having any previous episodes. History is obtained from the patient and chart.         Past Medical History:    has a past medical history of Carpal tunnel syndrome; Coronary atherosclerosis of native coronary artery (2006); OBSTRUCTIVE SLEEP APNEA; Osteoarthrosis, unspecified whether generalized or localized, unspecified site; Other and combined forms of senile cataract (2000); Other and unspecified hyperlipidemia; RESTLESS LEGS SYNDROME; TENOSYNOV HAND/WRIST NEC (6/30/2006); Type II or unspecified type diabetes mellitus without mention of complication, not stated as uncontrolled (2001); Typical atrial flutter (H) (01/17/2007); and Unspecified essential hypertension.          Past Surgical History:     Past Surgical History:   Procedure Laterality Date     ANGIOPLASTY       C APPENDECTOMY       C CABG, VEIN, FIVE  12/06    SVG x 4  and TATE to LAD     C SOTO W/O FACETEC FORAMOT/[facility]KC 1/2 VRT SEG, LUMBAR  1968     C REMV CATARACT INTRACAP,INSERT LENS      Bilateral     C TOTAL ABDOM HYSTERECTOMY  1995     - fibroids     C VAGOTOMY/PYLOROPLASTY,SELECT  1970     COLONOSCOPY  12/3/2007     COLONOSCOPY  1/17/2014    Procedure: COLONOSCOPY;  Colonoscopy;  Surgeon: Zack Stearns MD;  Location:  GI     CYSTOSCOPY  11/25/09    [facility]     ENDOSCOPY  03/21/2000    Upper GI     HC COLONOSCOPY THRU STOMA, DIAGNOSTIC  10/7/04    poor prep, repeat in 2-3 years     HC COLONOSCOPY W/WO BRUSH/WASH  10/31/07    Repeat-poor quality prep     HC REMOVAL OF OVARY/TUBE(S)      Salpingo-Oophorectomy, Unilateral     HC REVISE MEDIAN N/CARPAL TUNNEL SURG      Carpal tunnel release     HC UGI ENDOSCOPY DIAG W BIOPSY  10/31/07     HC UGI ENDOSCOPY, SIMPLE EXAM  12/3/2007     OPEN REDUCTION INTERNAL FIXATION HIP NAILING Left 7/3/2016    Procedure: OPEN REDUCTION INTERNAL FIXATION HIP NAILING;  Surgeon: Lake Schulz MD;  Location:  OR            Medications:     Prior to Admission medications    Medication Sig Start Date End Date Taking? Authorizing Provider   alum & mag hydroxide-simethicone (MYLANTA ES/MAALOX  ES) 400-400-40 MG/5ML SUSP suspension Take 10 mLs by mouth every 6 hours as needed for indigestion Take for 3 days starting on 10/21/17.   Yes Unknown, Entered By History   WARFARIN SODIUM PO Take 3.5 mg by mouth daily   Yes Unknown, Entered By History   sodium polystyrene (KAYEXALATE) 15 GM/60ML suspension Take 30 g by mouth Take as a single dose directed for emergency use only as directed by nephrologist.   Yes Unknown, Entered By History   ondansetron (ZOFRAN ODT) 4 MG ODT tab Take 4 mg by mouth every 6 hours as needed for nausea   Yes Unknown, Entered By History   AMIODARONE HCL PO Take 200 mg by mouth 2 times daily    Yes Reported, Patient   nystatin (MYCOSTATIN) 752520 UNIT/GM POWD Apply topically 2 times daily as needed    Yes Reported,  Patient   cholecalciferol 5000 UNITS CAPS Take 1 capsule (5,000 Units) by mouth daily 10/18/17 11/17/17 Yes Hank Babcock MD   clonazePAM (KLONOPIN) 0.5 MG tablet Take 0.5 tablets (0.25 mg) by mouth 2 times daily as needed for anxiety 10/18/17 10/25/17 Yes Hank Babcock MD   darbepoetin hira (ARANESP, ALBUMIN FREE,) 60 MCG/0.3ML injection Inject 0.3 mLs (60 mcg) Subcutaneous every 14 days As needed for hgb<10g/dL.  If Hgb increases >1 point in 2 weeks (if blood transfusion given, use hgb PRIOR to this), SYSTOLIC BP > 180 mmHg or hgb>=10g/dL, HOLD DOSE. Dose must be within 1 week of Hgb.  Per anemia protocol with Vibha Barfield MD/Yesy Samaniego,PharmD [phone] 9/20/17  Yes Vibha Barfield MD   pramipexole (MIRAPEX) 0.125 MG tablet Take 1 tablet (0.125 mg) by mouth 3 times daily 9/15/17  Yes Mike Irene MD   fluticasone (FLONASE) 50 MCG/ACT spray Spray 1 spray into both nostrils daily 9/1/17  Yes Mike Tadeo MD   Calcium Carb-Cholecalciferol 500-400 MG-UNIT TABS Take 1 tablet by mouth 2 times daily   Yes Unknown, Entered By History   ipratropium - albuterol 0.5 mg/2.5 mg/3 mL (DUONEB) 0.5-2.5 (3) MG/3ML neb solution Take 1 vial by nebulization 3 times daily   Yes Unknown, Entered By History   ACETAMINOPHEN PO Take 650 mg by mouth every 4 hours as needed for pain For pain 1-5 out of 10   Yes Reported, Patient   nitroGLYcerin (NITROSTAT) 0.4 MG sublingual tablet Place 1 tablet (0.4 mg) under the tongue every 5 minutes as needed for chest pain 8/18/17  Yes Yareli Lorenzo MD   pravastatin (PRAVACHOL) 40 MG tablet Take 1 tablet (40 mg) by mouth daily 8/18/17  Yes Yareli Lorenzo MD   calcium acetate (PHOSLO) 667 MG CAPS capsule Take 1 capsule (667 mg) by mouth 3 times daily (with meals) 8/18/17  Yes Yareli Lorenzo MD   pentoxifylline (TRENTAL) 400 MG CR tablet Take 1 tablet (400 mg) by mouth daily 8/18/17  Yes Yareli Lorenzo MD   Lactobacillus  (ACIDOPHILUS) tablet Take 1 tablet by mouth daily 8/18/17  Yes Lake Pierre MD   ORDER FOR DME Equipment being ordered: cpap machine, mask, humidifier, tubing and filters 3/4/14   Dheeraj Jj MD            Allergies:     Allergies   Allergen Reactions     Ciprofloxacin Nausea and Vomiting     Zofran did not help     Oxycodone Visual Disturbance     Delusions, blackouts      Lisinopril Cough     Sulfa Drugs GI Disturbance     LOSS OF TASTE            Social History:     Social History   Substance Use Topics     Smoking status: Former Smoker     Years: 10.00     Quit date: 1/1/1969     Smokeless tobacco: Never Used     Alcohol use 0.0 oz/week     0 Standard drinks or equivalent per week      Comment: 1/2 a beer once a month             Family History:    This patient has no significant relevant family history.  Family history is reviewed in detail.          Review of Systems:   Brief ROS is negative other than noted in the HPI.  C: NEGATIVE for fever, chills, change in weight  R: NEGATIVE for significant cough or SOB  CV: NEGATIVE for chest pain, palpitations or peripheral edema  GI:  NEGATIVE for dysuria, heartburn, or change in bowel habits  H: NEGATIVE for bleeding problems         Physical Exam:   Blood pressure 113/66, pulse 90, temperature 98.2  F (36.8  C), temperature source Oral, resp. rate 16, weight 65.8 kg (145 lb), SpO2 98 %, not currently breastfeeding.       General - This is a well developed, well nourished female .  HEENT - Normocephalic. Atraumatic. Moist mucous membranes. Pupils equal.  No scleral icterus. Nose normal.  Neck - Supple without masses. No cervical adenopathy or thyromegaly  Lungs - Breathing not labored  Chest - Not tender. CVA's nontender  Heart - Regular rate & rhythm   Abdomen - Soft, minimally tender, nondistended with +bowel sounds, no organomegaly.  Extremities - Moves all extremities. Warm without edema.  Neurologic - Nonfocal.          Data:   Labs:  Recent Labs    Lab Test  10/22/17   0305  10/16/17   1020  10/15/17   0748  10/14/17   0539  10/12/17   0456   WBC  7.4   --    --   7.4  9.4   HGB  10.4*  9.6*  10.4*  8.7*  8.1*   HCT  33.1*   --    --   28.5*  26.6*   PLT  357   --    --   205  161     Recent Labs   Lab Test  10/22/17   0305  10/16/17   0545  10/15/17   0700   POTASSIUM  4.1  4.3  3.9   CHLORIDE  96  111*  111*   CO2  30  22  25   BUN  21  26  18   CR  3.91*  4.51*  3.69*     Recent Labs   Lab Test  10/22/17   0305  10/12/17   0456  10/11/17   0420   01/31/13   1025   06/07/12   1222   BILITOTAL  0.3  0.4  0.3   < >  0.4   < >  0.3   ALT  9  8  9   < >  28   < >  15   AST  14  9  12   < >  21   < >  21   ALKPHOS  180*  128  145   < >  232*   < >  147   AMYLASE   --    --    --    --    --    --   67   LIPASE  124   --    --    --   213   --   160    < > = values in this interval not displayed.     Recent Labs   Lab Test  10/22/17   0305  10/18/17   0625  10/17/17   0615   10/11/17   0420   08/27/17   1013  08/27/17   0836  05/16/14 05/07/14   INR  3.19*  1.73*  1.51*   < >  1.08   < >  1.25*  Unsatisfactory specimen - clotted   < >  6.986   < >  1.5   PT   --    --    --    --    --    --    --    --    --   74.1   --    --    PTT   --    --    --    --   39*   --   35  Unsatisfactory specimen - clotted   --    --    --    --     < > = values in this interval not displayed.     Recent Labs   Lab Test  10/22/17   0305  10/16/17   0545  10/15/17   0700   10/12/17   1600   10/10/17   0409  10/09/17   0441   MALICK  8.2*  7.4*  7.7*   < >   --    < >  7.7*  7.6*   PHOS   --   4.9*  3.8   --   3.9   --   2.0*  3.4   MAG   --    --   1.8   --    --    --   1.7  2.1    < > = values in this interval not displayed.     Recent Labs   Lab Test  10/22/17   0305  10/16/17   0545  10/15/17   0700   10/12/17   0456   10/01/17   2255   09/13/17   1940   08/26/17   1250   ANIONGAP  10  10  8   < >  6   < >   --    < >   --    < >   --    PROTEIN   --    --    --    --    --     --   30*   --   Negative   --   Negative   ALBUMIN  2.5*  2.2*   --    --   2.6*   < >   --    < >   --    < >   --     < > = values in this interval not displayed.       CT scan of the abdomen: images reviewed in detail. Many dilated SB loops. Several non-dilated loops, some in pelvis. I do not see convincing evidence for twist or closed loop obstruction, rather see narrowing at pelvic brim.     Ultrasound of the abdomen: not done        EKG: Complete; See Chart         Assessment:   Dilated bowel loops. Minimal NG output, pain resolved. Not clear if she has a bowel obstruction.          Plan:    Reverse warfarin. abd film in AM. Try hard to stand up and perhaps walk.        I have discussed the history, physical, and plan with the patient.   Dheeraj Perez M.D.

## 2017-10-22 NOTE — ED NOTES
Bed: ED26  Expected date: 10/22/17  Expected time:   Means of arrival:   Comments:  [place] /abd pain

## 2017-10-22 NOTE — IP AVS SNAPSHOT
"[place] [address] [facility]: [phone]                                              INTERAGENCY TRANSFER FORM - PHYSICIAN ORDERS   10/22/2017                    Hospital Admission Date: 10/22/2017  MARI HERNANDEZ   : 1940  Sex: Female        Attending Provider: Cara Fisher MD     Allergies:  Ciprofloxacin, Oxycodone, Lisinopril, Sulfa Drugs, Penicillins    Infection:  None   Service:  HOSPITALIST    Ht:  1.626 m (5' 4\")   Wt:  70.4 kg (155 lb 3.3 oz)   Admission Wt:  65.8 kg (145 lb)    BMI:  26.64 kg/m 2   BSA:  1.78 m 2            Patient PCP Information     Provider PCP Type    Dheeraj Jj MD General      ED Clinical Impression     Diagnosis Description Comment Added By Time Added    Complete intestinal obstruction, unspecified cause [K56.601] Complete intestinal obstruction, unspecified cause [K56.601]  Sienna Samaniego MD 10/22/2017  6:09 AM      Hospital Problems as of 2017              Priority Class Noted POA    ANABEL on CPAP Medium  Unknown Yes    Restless leg syndrome Low  Unknown Yes    Esophageal reflux Medium  3/28/2006 Yes    CAD - NSTEMI, CABG x 5 in , angio  patent grafts except occluded graft to PL Medium  2013 Yes    CRF (chronic renal failure), stage 4 (severe) (H) Medium  3/23/2015 Yes    Essential hypertension with goal blood pressure less than 140/90 Medium  2016 Yes    Acute on chronic renal failure -- Dialysis started 10/2017 Medium  2017 Yes    Anemia in chronic kidney disease Medium  2017 Yes    Paroxysmal atrial fibrillation -- on Warfarin Medium  10/20/2017 Yes    * (Principal)SBO -- S/P Lysis of Adhes 10/24/17 Medium  10/22/2017 Yes    Postoperative ileus (H) Medium  10/28/2017 Unknown    DM type 2 -- Hgb A1C 6.6 on 10/13/17 Medium  10/29/2017 Unknown      Non-Hospital Problems as of 2017              Priority Class Noted    Lipoma of other skin and subcutaneous tissue Medium  2004    Iron " deficiency anemia Medium  10/17/2007    History of peptic ulcer disease Low  10/17/2007    Kidney stone Medium  12/29/2009    Hyperlipidemia LDL goal <100 Medium  10/31/2010    Advanced directives, counseling/discussion Low  11/12/2012    Health Care Home Medium  1/20/2014    Lymphedema Medium  11/10/2014    Osteoarthritis of hip Medium  4/29/2015    Pulmonary hypertension Medium  5/24/2017    Chronic heart failure (H) with preserved EF Medium  5/31/2017    Atrial flutter (H) - present 6/12 Medium  5/31/2017    Hypertensive heart and chronic kidney disease with heart failure and stage 1 through stage 4 chronic kidney disease, or unspecified chronic kidney disease Medium  6/1/2017    Proteinuria 2.1 g/g Cr Medium  6/2/2017    Bradycardia Medium  6/2/2017    Memory loss Medium  6/12/2017    Blood in stool Medium  6/14/2017    Microscopic hematuria Medium  6/21/2017    CHF (congestive heart failure) (H) Medium  7/6/2017    Anemia, iron deficiency Medium  8/9/2017    Diarrhea, unspecified type Medium  10/20/2017    Anxiety Medium  10/20/2017      Code Status History     Date Active Date Inactive Code Status Order ID Comments User Context    10/31/2017  1:11 PM  Full Code [id number]  Emmanuel Stuart MD Outpatient    10/22/2017 11:25 AM 10/31/2017  1:11 PM Full Code [id number]  Cara Fisher MD Inpatient    10/18/2017  9:22 AM 10/22/2017 11:25 AM Full Code [id number]  Hank Babcock MD Outpatient    10/2/2017  3:17 AM 10/18/2017  9:22 AM Full Code [id number]  Beth Laird MD Inpatient    9/13/2017 10:58 PM 9/15/2017  7:27 PM Full Code [id number]  Mike Tadeo MD Inpatient    9/1/2017 11:47 AM 9/13/2017 10:58 PM Full Code [id number]  Mike Tadeo MD Outpatient    8/26/2017  9:59 AM 9/1/2017 11:47 AM Full Code [id number]  Cameron Burrell MD Inpatient    8/18/2017 11:32 AM 8/26/2017  9:59 AM Full Code [id number]  [place], MD Outpatient    8/12/2017  4:31 PM 8/18/2017 11:32 AM  Full Code [id number]  Gilda Muro MD Inpatient    7/11/2017  3:54 PM 8/12/2017  4:31 PM Full Code [id number]  Damian Contreras MD Outpatient    7/6/2017 10:16 PM 7/11/2017  3:54 PM Full Code [id number]  Maegan Xavier MD Inpatient    6/21/2017  2:14 PM 6/25/2017  3:28 PM Full Code [id number]  Erin Pinedo MD Inpatient    6/15/2017  4:01 PM 6/21/2017  2:14 PM Full Code [id number]  Arash Silva MD Outpatient    6/12/2017  8:17 PM 6/15/2017  4:01 PM Full Code [id number]  Mike Wong MD Inpatient    6/6/2017  1:12 PM 6/12/2017  8:17 PM Full Code [id number]  Kenji Fish MD Outpatient    5/31/2017  9:20 PM 6/6/2017  1:12 PM Full Code [id number]  Mike Wong MD Inpatient    5/2/2017 10:00 AM 5/31/2017  9:20 PM Full Code [id number]  Jian Hartley MD Outpatient    4/28/2017  7:16 PM 5/2/2017 10:00 AM Full Code [id number]  Jian Hartley MD Inpatient    7/6/2016  1:46 PM 4/28/2017  7:16 PM Full Code [id number]  Isadora Mosley PA-C Outpatient    7/6/2016 10:41 AM 7/6/2016  1:46 PM Full Code [id number]  Arash Silva MD Outpatient    7/3/2016 11:12 AM 7/6/2016 10:41 AM Full Code [id number]  Isadora Mosley PA-C Inpatient    7/2/2016  5:08 AM 7/3/2016 11:12 AM Full Code [id number]  Mike Wong MD Inpatient    3/23/2015  7:40 PM 3/25/2015  1:40 PM Full Code [id number]  Luisa Adan MD Inpatient    12/28/2014  9:42 AM 3/23/2015  7:40 PM Full Code [id number]  Jian Hartley MD Outpatient    12/27/2014  4:26 PM 12/28/2014  9:42 AM Full Code [id number]  Mick Dale MD ED    12/9/2014  2:02 PM 12/27/2014  4:26 PM Full Code [id number]  Jian Hartley MD Outpatient    11/13/2014 11:04 AM 12/9/2014  2:02 PM Full Code [id number]  Jian Hartley MD Outpatient    11/10/2014  6:44 PM 11/13/2014 11:04 AM Full Code [id number]  Mike Wong MD Inpatient    9/14/2014  1:12 PM 11/10/2014  6:44 PM Full Code [id number]  Jian Hartley MD  Outpatient    9/12/2014  1:02 PM 9/14/2014  1:12 PM Full Code [id number]  Radha Field PA-C Inpatient    7/20/2014  9:02 AM 9/12/2014  1:02 PM Full Code [id number]  Cesar Lafleur MD Outpatient    7/15/2014  5:49 PM 7/20/2014  9:02 AM Full Code [id number]  Radha Field PA-C Inpatient    2/22/2014  4:21 AM 2/22/2014  2:33 PM Full Code [id number]  Mike Ogden MD ED    1/17/2014  8:18 PM 1/18/2014 12:34 PM Full Code [id number]  Judson Gan RN Inpatient    2/1/2013  2:01 PM 1/17/2014  8:18 PM Full Code [id number]  Nadira Mccann MD Outpatient    2/1/2013 12:18 AM 2/1/2013  2:01 PM Full Code [id number]  Rajinder Dwyer MD Inpatient    1/31/2013  9:39 PM 2/1/2013 12:18 AM Full Code [id number]  Arash Silva MD Outpatient    1/31/2013  7:09 PM 1/31/2013  9:39 PM Full Code [id number]  Radha Field PA-C Inpatient    7/10/2008  2:44 PM 1/31/2013  7:09 PM None None HEALTH CARE DIRECTIVE ON FILE 7-3-08 [employer] [employer]         Medication Review      START taking        Dose / Directions Comments    famotidine 20 MG tablet   Commonly known as:  PEPCID        Dose:  20 mg   Take 1 tablet (20 mg) by mouth 2 times daily as needed   Quantity:  60 tablet   Refills:  1    For heartburn       hypromellose-dextran Soln ophthalmic solution   Used for:  Dry eyes        Dose:  1 drop   Place 1 drop into both eyes 3 times daily as needed for dry eyes   Refills:  0        LORazepam 0.5 MG tablet   Commonly known as:  ATIVAN   Used for:  Anxiety        Dose:  0.25 mg   Take 0.5 tablets (0.25 mg) by mouth every 8 hours as needed for anxiety   Quantity:  20 tablet   Refills:  0        metoprolol 25 MG tablet   Commonly known as:  LOPRESSOR   Used for:  Atrial flutter, unspecified type (H)        Dose:  12.5 mg   Take 0.5 tablets (12.5 mg) by mouth 2 times daily   Quantity:  60 tablet   Refills:  0          CONTINUE these medications which may have CHANGED, or have new prescriptions. If we are uncertain of the size  of tablets/capsules you have at home, strength may be listed as something that might have changed.        Dose / Directions Comments    warfarin 2 MG tablet   Commonly known as:  COUMADIN   This may have changed:    - medication strength  - how much to take  - how to take this  - when to take this  - additional instructions   Used for:  Atrial flutter, unspecified type (H)        1 mg daily for aflut and adjust for desired INR 2.0 to 3.0   Quantity:  30 tablet   Refills:  0          CONTINUE these medications which have NOT CHANGED        Dose / Directions Comments    ACETAMINOPHEN PO        Dose:  650 mg   Take 650 mg by mouth every 4 hours as needed for pain For pain 1-5 out of 10   Refills:  0        AMIODARONE HCL PO        Dose:  200 mg   Take 200 mg by mouth 2 times daily   Refills:  0        calcium acetate 667 MG Caps capsule   Commonly known as:  PHOSLO   Used for:  Chronic kidney disease, unspecified CKD stage        Dose:  667 mg   Take 1 capsule (667 mg) by mouth 3 times daily (with meals)   Quantity:  180 capsule   Refills:  0        cholecalciferol 5000 UNITS Caps   Used for:  CKD (chronic kidney disease) stage 5, GFR less than 15 ml/min (H)        Dose:  5000 Units   Take 1 capsule (5,000 Units) by mouth daily   Refills:  3        fluticasone 50 MCG/ACT spray   Commonly known as:  FLONASE   Used for:  Nasal congestion        Dose:  1 spray   Spray 1 spray into both nostrils daily   Quantity:  1 Bottle   Refills:  0        ipratropium - albuterol 0.5 mg/2.5 mg/3 mL 0.5-2.5 (3) MG/3ML neb solution   Commonly known as:  DUONEB        Dose:  1 vial   Take 1 vial by nebulization 3 times daily   Refills:  0        nitroGLYcerin 0.4 MG sublingual tablet   Commonly known as:  NITROSTAT   Used for:  Hx of non-ST elevation myocardial infarction (NSTEMI), Atherosclerosis of native coronary artery of native heart without angina pectoris, Postsurgical aortocoronary bypass status        Dose:  0.4 mg   Place 1  tablet (0.4 mg) under the tongue every 5 minutes as needed for chest pain   Quantity:  25 tablet   Refills:  0        nystatin [id number] UNIT/GM Powd   Commonly known as:  MYCOSTATIN        Apply topically 2 times daily as needed   Refills:  0        order for DME   Used for:  ANABEL (obstructive sleep apnea)        Equipment being ordered: cpap machine, mask, humidifier, tubing and filters   Quantity:  1 Device   Refills:  0        pentoxifylline 400 MG CR tablet   Commonly known as:  TRENtal   Used for:  Venous stasis ulcer (H)        Dose:  400 mg   Take 1 tablet (400 mg) by mouth daily   Refills:  0        pramipexole 0.125 MG tablet   Commonly known as:  MIRAPEX   Used for:  Restless leg syndrome        Dose:  0.125 mg   Take 1 tablet (0.125 mg) by mouth 3 times daily   Quantity:  90 tablet   Refills:  3        pravastatin 40 MG tablet   Commonly known as:  PRAVACHOL   Used for:  Hx of non-ST elevation myocardial infarction (NSTEMI), Atherosclerosis of native coronary artery of native heart without angina pectoris, Type 2 diabetes mellitus with other circulatory complications        Dose:  40 mg   Take 1 tablet (40 mg) by mouth daily   Quantity:  90 tablet   Refills:  3          STOP taking     acidophilus tablet           alum & mag hydroxide-simethicone 400-400-40 MG/5ML Susp suspension   Commonly known as:  MYLANTA ES/MAALOX  ES           Calcium Carb-Cholecalciferol 500-400 MG-UNIT Tabs           clonazePAM 0.5 MG tablet   Commonly known as:  klonoPIN           darbepoetin hira 60 MCG/0.3ML injection   Commonly known as:  ARANESP (ALBUMIN FREE)           sodium polystyrene 15 GM/60ML suspension   Commonly known as:  KAYEXALATE           ZOFRAN ODT 4 MG ODT tab   Generic drug:  ondansetron                   Summary of Visit     Reason for your hospital stay       Small bowel obstruction.             After Care     Activity - Up ad beata           Additional Discharge Instructions       Call Dr. Stuart at Pager  [phone] if questions, or Cell Phone [phone].       General info for SNF       Length of Stay Estimate: Short Term Care: Estimated # of Days <30  Condition at Discharge: Improving  Level of care:skilled   Rehabilitation Potential: Good  Admission H&P remains valid and up-to-date: Yes  Recent Chemotherapy: N/A  Use Nursing Home Standing Orders: Yes       Glucose monitor nursing POCT       Glucometer check twice daily, call physician if > 200 x 3 consecutive checks.       Mantoux instructions       Give two-step Mantoux (PPD) Per Facility Policy Yes             Referrals     Occupational Therapy Adult Consult       Evaluate and treat as clinically indicated.    Reason:  Assistance with ADL's       Physical Therapy Adult Consult       Evaluate and treat as clinically indicated.    Reason:  Weakness related to deconditioning             Your next 10 appointments already scheduled     Nov 02, 2017  2:00 PM CDT   (Arrive by 1:45 PM)   New Vascular Visit with Cassie Cardenas MD   [facility] ([facility])    [address]  3rd Floor  [facility] [id number] [phone]            Dec 05, 2017 12:30 PM CST   LAB with LAB FIRST FLOOR [facility] ([facility])    [address] [address] [place] [id number] [phone]           Please do not eat 10-12 hours before your appointment if you are coming in fasting for labs on lipids, cholesterol, or glucose (sugar). This does not apply to pregnant women. Water, hot tea and black coffee (with nothing added) are okay. Do not drink other fluids, diet soda or chew gum.            Dec 05, 2017  1:00 PM CST   Return Visit with Vibha Barfield MD   [facility] ([facility])    [address] [address] [place] [address] [phone]              Follow-Up Appointment Instructions     Future Labs/Procedures    Follow Up and recommended labs and  tests     Comments:    Follow up with primary care provider 1 week after discharge from TCU.  Check INR in 2 days, and adjust warfarin dose for desired INR of 2.0 to 3.0.      Follow up with Dr. Rashard Zafar, Vascular Surgery, in 2 weeks.    Continue Dialysis Mon, Wed, Friday as arranged by Nephrology. [facility] in [place]. Dialysis days are Monday, Wednesday, Friday. Patient needs to arrive by 1030 am. The dialysis unit phone number is [phone].      Follow-Up Appointment Instructions     Follow Up and recommended labs and tests       Follow up with primary care provider 1 week after discharge from TCU.  Check INR in 2 days, and adjust warfarin dose for desired INR of 2.0 to 3.0.      Follow up with Dr. Rashard Zafar, Vascular Surgery, in 2 weeks.    Continue Dialysis Mon, Wed, Friday as arranged by Nephrology. [facility] in [place]. Dialysis days are Monday, Wednesday, Friday. Patient needs to arrive by 1030 am. The dialysis unit phone number is [phone].             Statement of Approval     Ordered          10/31/17 1312  I have reviewed and agree with all the recommendations and orders detailed in this document.  EFFECTIVE NOW     Approved and electronically signed by:  Emmanuel Stuart MD

## 2017-10-22 NOTE — ED NOTES
[facility]  ED Nurse Handoff Report    ED Chief complaint: Abdominal Pain (bilateral lower abdominal pain since Thursday, small amounts of loose stool since then. recently started dialysis in TCU)      ED Diagnosis:   Final diagnoses:   Complete intestinal obstruction, unspecified cause       Code Status: tbd by hospitalist    Allergies:   Allergies   Allergen Reactions     Ciprofloxacin Nausea and Vomiting     Zofran did not help     Oxycodone Visual Disturbance     Delusions, blackouts      Lisinopril Cough     Sulfa Drugs GI Disturbance     LOSS OF TASTE       Activity level - Baseline/Home:  Independent    Activity Level - Current:   Stand with Assist     Needed?: No    Isolation: YES  Infection: c-dif, per hospitalist pt need to be in enteric precautions for 90 days from dx.    Bariatric?: No    Vital Signs:   Vitals:    10/22/17 0245 10/22/17 0246 10/22/17 0247 10/22/17 0308   BP: (!) 85/55  (!) 85/55 97/58   Pulse:   89    Resp:   16    Temp:   98.6  F (37  C)    TempSrc:   Oral    SpO2:  99% 99% 99%   Weight:   65.8 kg (145 lb)        Cardiac Rhythm: ,        Pain level: 0-10 Pain Scale: 0    Is this patient confused?: No    Patient Report: Initial Complaint: abd pain  Focused Assessment: recent dialysis pt with abd pain. Responsive to 4 mg morphine. Patient resting comfortably. Recently hospitalized for Resp Failure due to pneumonia, CHF, Atrial Fib with RVR.   Tests Performed: Labs, CT  Abnormal Results: Labs consistent with poor kidney function, CT shows intestinal obstruction  Treatments provided: abx, pain meds, NG tube    Family Comments: alone    OBS brochure/video discussed/provided to patient: N/A    ED Medications:   Medications   morphine (PF) injection 4 mg (4 mg Intravenous Given 10/22/17 0334)   piperacillin-tazobactam (ZOSYN) intermittent infusion 2.25 g (not administered)       Drips infusing?:  Yes      ED NURSE PHONE NUMBER: [phone]

## 2017-10-22 NOTE — IP AVS SNAPSHOT
` [place] [address] [facility]ITIES: [phone]            Medication Administration Report for Kathryn Banks as of 11/01/17 1317   Legend:    Given Hold Not Given Due Canceled Entry Other Actions    Time Time (Time) Time  Time-Action       Inactive    Active    Linked        Medications 10/26/17 10/27/17 10/28/17 10/29/17 10/30/17 10/31/17 11/01/17    - MEDICATION INSTRUCTIONS for Dialysis Patients -  Freq: SEE ADMIN INSTRUCTIONS Route: XX  Start: 10/22/17 1419   Admin Instructions: Do not give any medication that may affect blood pressure or volume before dialysis.       The following medications may be removed by dialysis and should be given after dialysis: METOPROLOL                 Dose: 100 mL Freq: EVERY 5 MIN PRN Route: IV  PRN Comment: see admin instructions  Start: 11/01/17 0721   End: 11/01/17 1245   Admin Instructions: FOR Systolic Blood Pressure less than 90 mmHg or for volume replacement during dialysis DURING DIALYSIS RUN           1245-Med Discontinued         Dose: 50 mL Freq: EVERY 1 HOUR PRN Route: IV  PRN Reason: other  PRN Comment: see admin instructions  Start: 11/01/17 0721   End: 11/01/17 1245   Admin Instructions: FOR Systolic Blood Pressure less than 90 mmHg or for volume replacement during dialysis DURING DIALYSIS RUN           1245-Med Discontinued       amiodarone (PACERONE/CODARONE) tablet 200 mg  Dose: 200 mg Freq: 2 TIMES DAILY Route: PO  Start: 10/22/17 1245   Admin Instructions: Ok to give Po amiodarone and clamp the NG  Avoid grapefruit juice during oral amiodarone treatment.     0823 (200 mg)-Given       2152 (200 mg)-Given        0824 (200 mg)-Given       2113 (200 mg)-Given        0927 (200 mg)-Given       2056 (200 mg)-Given        0855 (200 mg)-Given       2128 (200 mg)-Given        0943 (200 mg)-Given       2125 (200 mg)-Given        0850 (200 mg)-Given       2228 (200 mg)-Given        0823 (200 mg)-Given       [ ] 2100           benzocaine-menthol  (CHLORASEPTIC) 6-10 MG lozenge 1-2 lozenge  Dose: 1-2 lozenge Freq: EVERY 1 HOUR PRN Route: BU  PRN Reason: sore throat  PRN Comment: dry/sore throat without fever  Start: 10/23/17 1337              bisacodyl (DULCOLAX) Suppository 10 mg  Dose: 10 mg Freq: DAILY PRN Route: RE  PRN Reason: constipation  Start: 10/28/17 0812      1048 (10 mg)-Given        1042 (10 mg)-Given                Dose: 4,000 Units Freq: SEE ADMIN INSTRUCTIONS Route: IV  Start: 11/01/17 0721   End: 11/01/17 1245   Admin Instructions: Given during each dialysis (per request)  For ordered doses up to 500 units/kg, give IV Push undiluted over 1 minute.           0955 (4,000 Units)-Given       1245-Med Discontinued       fluticasone (FLONASE) 50 MCG/ACT spray 1 spray  Dose: 1 spray Freq: DAILY Route: BOTH NOSTRIL  Start: 10/22/17 1130    1013 (1 spray)-Given        0819 (1 spray)-Given        0928 (1 spray)-Given        0901 (1 spray)-Given        0945 (1 spray)-Given        0853 (1 spray)-Given        0824 (1 spray)-Given           glucose 40 % gel 15-30 g  Dose: 15-30 g Freq: EVERY 15 MIN PRN Route: PO  PRN Reason: low blood sugar  Start: 10/22/17 1607   Admin Instructions: Give 15 g for BG 51 to 69 mg/dL IF patient is conscious and able to swallow. Give 30 g for BG less than or equal to 50 mg/dL IF patient is conscious and able to swallow. Do NOT give glucose gel via enteral tube.  IF patient has enteral tube: give apple juice 120 mL (4 oz or 15 g of CHO) via enteral tube for BG 51 to 69 mg/dL.  Give apple juice 240 mL (8 oz or 30 g of CHO) via enteral tube for BG less than or equal to 50 mg/dL.    ~Oral gel is preferable for conscious and able to swallow patient.   ~IF gel unavailable or patient refuses may provide apple juice 120 mL (4 oz or 15 g of CHO). Document juice on I and O flowsheet.              Or  dextrose 50 % injection 25-50 mL  Dose: 25-50 mL Freq: EVERY 15 MIN PRN Route: IV  PRN Reason: low blood sugar  Start: 10/22/17 1977    Admin Instructions: Use if have IV access, BG less than 70 mg/dL and meet dose criteria below:  Dose if conscious and alert (or disorientated) and NPO = 25 mL  Dose if unconscious / not alert = 50 mL  Vesicant. Up to 25g may be given IV Push undiluted. Each 5g over 1 minute.              Or  glucagon injection 1 mg  Dose: 1 mg Freq: EVERY 15 MIN PRN Route: SC  PRN Reason: low blood sugar  PRN Comment: May repeat x 1 only  Start: 10/22/17 1607   Admin Instructions: May give SQ or IM. ONLY use glucagon IF patient has NO IV access AND is UNABLE to swallow AND blood glucose is LESS than or EQUAL to 50 mg/dL.                 Dose: 500 Units Freq: ONCE IN DIALYSIS Route: HM Machine  Start: 11/01/17 0730   End: 11/01/17 1245   Admin Instructions: LOADING DOSE  Administer loading dose prior to heparin infusion.   PRE DIALYSIS RUN   Dialysis           [ ] 0730       1245-Med Discontinued         Rate: 0.5 mL/hr Freq: CONTINUOUS Route: HM Machine  Start: 11/01/17 0730   End: 11/01/17 1245   Admin Instructions: DURING DIALYSIS TREATMENT           [ ] 0730       1245-Med Discontinued         Dose: 3 mL Freq: ONCE PRN Route: IV  PRN Comment: To BLUE lumen for dialysis catheter care POST RUN  Start: 11/01/17 0721   End: 11/01/17 1245   Admin Instructions: Administer volume specific to catheter lumen/catheter length (dose above is the size of vial or syringe dispensed, not volume to administer). Administer POST DIALYSIS RUN.           1245-Med Discontinued         Dose: 3 mL Freq: ONCE PRN Route: IV  PRN Comment: To RED lumen for dialysis catheter care POST RUN  Start: 11/01/17 0721   End: 11/01/17 1245   Admin Instructions: Administer volume specific to catheter lumen/catheter length (dose above is the size of vial or syringe dispensed, not volume to administer). Administer POST DIALYSIS RUN.           1245-Med Discontinued       HYDROmorphone (PF) (DILAUDID) injection 0.2 mg  Dose: 0.2 mg Freq: EVERY 1 HOUR PRN Route: IV  PRN  Reason: severe pain  Start: 10/24/17 2011   Admin Instructions: Give IV Push undiluted up to 4 mg. Each 2mg over 2-5 minutes.               hypromellose-dextran (ARTIFICAL TEARS) ophthalmic solution 1 drop  Dose: 1 drop Freq: 3 TIMES DAILY PRN Route: Both Eyes  PRN Reason: dry eyes  Start: 10/28/17 1209        0157 (1 drop)-Given             insulin aspart (NovoLOG) inj (RAPID ACTING)  Dose: 1-5 Units Freq: AT BEDTIME Route: SC  Start: 10/29/17 2200   Admin Instructions: MEDIUM INSULIN RESISTANCE DOSING    Do Not give Bedtime Correction Insulin if BG less than  200.   For  - 249 give 1 units.   For  - 299 give 2 units.   For  - 349 give 3 units.   For  -399 give 4 units.   For BG greater than or equal to 400 give 5 units.  Notify provider if glucose greater than or equal to 350 mg/dL after administration of correction dose.  If given at mealtime, must be administered 5 min before meal or immediately after.        (2132)-Not Given        (2118)-Not Given [C]        (2315)-Not Given        [ ] 2200           insulin aspart (NovoLOG) inj (RAPID ACTING)  Dose: 1-7 Units Freq: 3 TIMES DAILY BEFORE MEALS Route: SC  Start: 10/29/17 1830   Admin Instructions: Correction Scale - MEDIUM INSULIN RESISTANCE DOSING     Do Not give Correction Insulin if Pre-Meal BG less than 140.   For Pre-Meal  - 189 give 1 unit.   For Pre-Meal  - 239 give 2 units.   For Pre-Meal  - 289 give 3 units.   For Pre-Meal  - 339 give 4 units.   For Pre-Meal - 399 give 5 units.   For Pre-Meal -449 give 6 units  For Pre-Meal BG greater than or equal to 450 give 7 units.   To be given with prandial insulin, and based on pre-meal blood glucose.    Notify provider if glucose greater than or equal to 350 mg/dL after administration of correction dose.  If given at mealtime, must be administered 5 min before meal or immediately after.                (0800)-Not Given [C]       (1149)-Not Given [C]      "  (1726)-Not Given        (0720)-Not Given [C]       (1138)-Not Given [C]       1809 (1 Units)-Given        (0823)-Not Given       (1307)-Not Given       [ ] 1700           ipratropium - albuterol 0.5 mg/2.5 mg/3 mL (DUONEB) neb solution 3 mL  Dose: 1 vial Freq: EVERY 4 HOURS PRN Route: NEBULIZATION  PRN Reasons: wheezing,shortness of breath / dyspnea  Start: 10/23/17 1400              lidocaine 1 % 1 mL  Dose: 1 mL Freq: EVERY 1 HOUR PRN Route: OTHER  PRN Comment: mild pain with VAD insertion or accessing implanted port  Start: 10/24/17 1306   Admin Instructions: Do NOT give if patient has a history of allergy to any local anesthetic or any \"rah\" product. MAX dose 1 mL subcutaneous OR intradermal in divided doses.               LORazepam (ATIVAN) injection 0.25 mg  Dose: 0.25 mg Freq: DAILY PRN Route: IV  PRN Reason: anxiety  PRN Comment: Before dialysis.  Start: 10/23/17 1358   Admin Instructions: For IV PUSH: Dilute with equal volume of NS. Up to 4 mg may be given IV Push. Each 2mg over 1-5 minutes.     0113 (0.25 mg)-Given       2208 (0.25 mg)-Given        1252 (0.25 mg)-Given          1145 (0.25 mg)-Given         0913 (0.25 mg)-Given           melatonin sublingual tablet 5 mg  Dose: 5 mg Freq: AT BEDTIME PRN Route: SL  PRN Reason: sleep  Start: 10/25/17 0739      2222 (5 mg)-Given        2131 (5 mg)-Given        2139 (5 mg)-Given        2231 (5 mg)-Given            metoclopramide (REGLAN) injection 5 mg  Dose: 5 mg Freq: EVERY 8 HOURS Route: IV  Start: 10/27/17 0800   Admin Instructions: Avoid use if patient has full bowel obstruction or perforation.  Avoid use if patient has full bowel obstruction or perforation. Irritant. Give IV Push undiluted over 2 minutes up to 10mg.      0824 (5 mg)-Given       1638 (5 mg)-Given        0024 (5 mg)-Given       0803 (5 mg)-Given       1534 (5 mg)-Given        0059 (5 mg)-Given       0855 (5 mg)-Given       1823 (5 mg)-Given       2357 (5 mg)-Given        (0944)-Not Given " [C]       1559 (5 mg)-Given        0129 (5 mg)-Given       (0853)-Not Given [C]       (1638)-Not Given       (2326)-Not Given        (0828)-Not Given [C]       [ ] 1600           metoprolol (LOPRESSOR) half-tab 12.5 mg  Dose: 12.5 mg Freq: 2 TIMES DAILY Route: PO  Start: 10/29/17 0900   Admin Instructions: Hold for SBP < 100 or HR < 60        0854 (12.5 mg)-Given       2128 (12.5 mg)-Given        (0948)-Not Given [C]       2125 (12.5 mg)-Given        0850 (12.5 mg)-Given       2228 (12.5 mg)-Given        0829-Hold [C]       1315 (12.5 mg)-Given [C]       [ ] 2100           miconazole (MICATIN; MICRO GUARD) 2 % powder  Freq: 2 TIMES DAILY Route: Top  Start: 10/22/17 1200    (0400)-Not Given       1013 ( )-Given       (2236)-Not Given        (0818)-Not Given       (2209)-Not Given        (0900)-Not Given [C]       2101 ( )-Given        1039 ( )-Given       2132 ( )-Given        (0947)-Not Given [C]       (2128)-Not Given        (0853)-Not Given       (2316)-Not Given        (0828)-Not Given       [ ] 2100           naloxone (NARCAN) injection 0.1-0.4 mg  Dose: 0.1-0.4 mg Freq: EVERY 2 MIN PRN Route: IV  PRN Reason: opioid reversal  Start: 10/22/17 1125   Admin Instructions: For respiratory rate LESS than or EQUAL to 8.  Partial reversal dose:  0.1 mg titrated q 2 minutes for Analgesia Side Effects Monitoring Sedation Level of 3 (frequently drowsy, arousable, drifts to sleep during conversation).Full reversal dose:  0.4 mg bolus for Analgesia Side Effects Monitoring Sedation Level of 4 (somnolent, minimal or no response to stimulation).  Up to 2mg may be given IV Push undiluted each 0.4mg over 15 seconds in emergency situations. For non-emergent situations further dilute in 9mL of NS to facilitate titration of response.               nitroGLYcerin (NITROSTAT) sublingual tablet 0.4 mg  Dose: 0.4 mg Freq: EVERY 5 MIN PRN Route: SL  PRN Reason: chest pain  Start: 10/22/17 1125              ondansetron (ZOFRAN-ODT) ODT tab  4 mg  Dose: 4 mg Freq: EVERY 6 HOURS PRN Route: PO  PRN Reasons: nausea,vomiting  Start: 10/22/17 1125   Admin Instructions: This is Step 1 of nausea and vomiting management.  If nausea not resolved in 15 minutes, go to Step 2 prochlorperazine (COMPAZINE). Do not push through foil backing. Peel back foil and gently remove. Place on tongue immediately. Administration with liquid unnecessary              Or  ondansetron (ZOFRAN) injection 4 mg  Dose: 4 mg Freq: EVERY 6 HOURS PRN Route: IV  PRN Reasons: nausea,vomiting  Start: 10/22/17 1125   Admin Instructions: This is Step 1 of nausea and vomiting management.  If nausea not resolved in 15 minutes, go to Step 2 prochlorperazine (COMPAZINE).  Irritant. Up to 4 mg may be given IV Push undiluted over 2-5 minutes.               pantoprazole (PROTONIX) EC tablet 40 mg  Dose: 40 mg Freq: EVERY MORNING Route: PO  Start: 10/30/17 0900   Admin Instructions: DO NOT CRUSH.         0943 (40 mg)-Given        0850 (40 mg)-Given        0823 (40 mg)-Given           pramipexole (MIRAPEX) tablet 0.125 mg  Dose: 0.125 mg Freq: 3 TIMES DAILY Route: PO  Start: 10/27/17 2245      0023 (0.125 mg)-Given       0927 (0.125 mg)-Given       1535 (0.125 mg)-Given       2221 (0.125 mg)-Given        0854 (0.125 mg)-Given       1822 (0.125 mg)-Given       2128 (0.125 mg)-Given        0943 (0.125 mg)-Given       1558 (0.125 mg)-Given       2125 (0.125 mg)-Given        0850 (0.125 mg)-Given       (1641)-Not Given [C]       2228 (0.125 mg)-Given       2241 (0.125 mg)-Given [C]        0823 (0.125 mg)-Given       [ ] 1600       [ ] 2200           prochlorperazine (COMPAZINE) injection 5 mg  Dose: 5 mg Freq: EVERY 6 HOURS PRN Route: IV  PRN Reasons: nausea,vomiting  Start: 10/22/17 1125   Admin Instructions: This is Step 2 of nausea and vomiting management.   If nausea not resolved in 15 minutes, give metoclopramide (REGLAN) if ordered (step 3 of nausea and vomiting management)  Up to 10 mg may be given  IV Push undiluted. Each 5mg over 1 minute.              Or  prochlorperazine (COMPAZINE) tablet 5 mg  Dose: 5 mg Freq: EVERY 6 HOURS PRN Route: PO  PRN Reason: vomiting  Start: 10/22/17 1125   Admin Instructions: This is Step 2 of nausea and vomiting management.   If nausea not resolved in 15 minutes, give metoclopramide (REGLAN) if ordered (step 3 of nausea and vomiting management)              Or  prochlorperazine (COMPAZINE) Suppository 12.5 mg  Dose: 12.5 mg Freq: EVERY 12 HOURS PRN Route: RE  PRN Reasons: nausea,vomiting  Start: 10/22/17 1125   Admin Instructions: This is Step 2 of nausea and vomiting management.   If nausea not resolved in 15 minutes, give metoclopramide (REGLAN) if ordered (step 3 of nausea and vomiting management)               Warfarin Therapy Reminder (Check START DATE - warfarin may be starting in the FUTURE)  Freq: CONTINUOUS PRN Route: XX  Start: 10/29/17 0800   Admin Instructions: * Note to reorder warfarin (COUMADIN) daily*  PROVIDER is managing warfarin dosing  Patient is on Warfarin Therapy - check for daily order              Completed Medications  Medications 10/26/17 10/27/17 10/28/17 10/29/17 10/30/17 10/31/17 11/01/17         Dose: 250-1,000 mL Freq: ONCE IN DIALYSIS Route: IV  Start: 11/01/17 0730   End: 11/01/17 0955   Admin Instructions: For patient prime during dialysis           0955 (250 mL)-New Bag             Dose: 250 mL Freq: ONCE Route: HM Machine  Start: 11/01/17 0730   End: 11/01/17 0955   Admin Instructions: For Dialyzer Prime. (In Dialyzer)           0955 (250 mL)-New Bag             Dose: 250-1,000 mL Freq: ONCE IN DIALYSIS Route: IV  Start: 10/30/17 0915   End: 10/30/17 1231   Admin Instructions: For patient prime during dialysis         1231 (400 mL)-New Bag               Dose: 250 mL Freq: ONCE Route: HM Machine  Start: 10/30/17 0915   End: 10/30/17 1229   Admin Instructions: For Dialyzer Prime. (In Dialyzer)         1229 (250 mL)-New Bag               Dose:  4,000 Units Freq: ONCE Route: IV  Start: 10/30/17 0915   End: 10/30/17 1232   Admin Instructions: Given during each dialysis (per request)  Give IV Push undiluted over 1 minute up to 500 units/kg.         1232 (4,000 Units)-Given               Dose: 1 mg Freq: ONCE AT 6PM Route: PO  Start: 10/31/17 1800   End: 10/31/17 1944         1944 (1 mg)-Given           Discontinued Medications  Medications 10/26/17 10/27/17 10/28/17 10/29/17 10/30/17 10/31/17 11/01/17         Dose: 100 mL Freq: EVERY 5 MIN PRN Route: IV  PRN Comment: see admin instructions  Start: 10/30/17 0914   End: 10/30/17 1511   Admin Instructions: FOR Systolic Blood Pressure less than 90 mmHg or for volume replacement during dialysis DURING DIALYSIS RUN         1511-Med Discontinued           Rate: 10 mL/hr Freq: CONTINUOUS Route: IV  Start: 10/24/17 1315   End: 10/31/17 1046   Admin Instructions: IF patient on dialysis.          1046-Med Discontinued          Rate: 50 mL/hr Freq: CONTINUOUS Route: IV  Start: 10/25/17 1045   End: 10/31/17 1046    0051 ( )-New Bag       1238 ( )-New Bag        0004 ( )-New Bag       1252 ( )-New Bag       1631 ( )-Rate/Dose Change        0632 ( )-New Bag        0412 ( )-New Bag       2356 ( )-New Bag        0943 ( )-Rate/Dose Verify       2022 ( )-New Bag        1046-Med Discontinued          Freq: HOLD Route: XX  Start: 10/22/17 1153   End: 10/30/17 1121   Admin Instructions: Hold warfarin (COUMADIN)         1121-Med Discontinued           Dose: 0.3-0.5 mg Freq: EVERY 2 HOURS PRN Route: IV  PRN Reasons: moderate to severe pain,severe pain  Start: 10/22/17 1125   End: 10/30/17 1126   Admin Instructions: Hold while on PCA.         1126-Med Discontinued           Dose: 1-6 Units Freq: EVERY 4 HOURS Route: SC  Start: 10/22/17 1615   End: 10/29/17 1828   Admin Instructions: Correction Scale - MEDIUM INSULIN RESISTANCE DOSING     Do Not give Correction Insulin if BG less than 140.  For  - 189 give 1 unit.  For  -  239 give 2 units.  For  - 289 give 3 units.  For  - 339 give 4 units.  For  - 399 give 5 units.  For BG greater than or equal to 400 give 6 units.  Check blood glucose Q4H and administer based on blood glucose.  Notify provider if glucose greater than or equal to 350 mg/dL after administration of correction dose.  If given at mealtime, must be administered 5 min before meal or immediately after.     (0034)-Not Given       (0651)-Not Given       (0925)-Not Given       1148 (1 Units)-Given       (1607)-Not Given       (2134)-Not Given [C]        (0013)-Not Given       (0420)-Not Given       (0817)-Not Given       (1432)-Not Given       1640 (1 Units)-Given       (2209)-Not Given [C]        (0031)-Not Given       (0510)-Not Given       (0810)-Not Given [C]       (1215)-Not Given [C]       (1720)-Not Given       (2101)-Not Given        (0105)-Not Given       (0417)-Not Given       (0751)-Not Given [C]       (1213)-Not Given [C]       1824 (1 Units)-Given [C]       1828-Med Discontinued                  Freq: CONTINUOUS PRN Route: XX  Start: 10/30/17 0914   End: 10/30/17 1511   Admin Instructions: No heparin during dialysis.         1511-Med Discontinued           Dose: 2.5 mg Freq: ONCE AT 6PM Route: PO  Start: 10/29/17 1800   End: 10/29/17 1535       1535-Med Discontinued            Dose: 1 each Freq: NO DOSE TODAY (WARFARIN) Route: XX  Start: 10/30/17 1657   End: 10/30/17 2356   Admin Instructions: No dose of Warfarin due today per order         2356-Med Discontinued      Medications 10/26/17 10/27/17 10/28/17 10/29/17 10/30/17 10/31/17 11/01/17

## 2017-10-22 NOTE — PLAN OF CARE
Problem: Patient Care Overview  Goal: Plan of Care/Patient Progress Review  Outcome: No Change  Up to unit approx 1015. A&Ox4, VSS on 2LNC. LS diminished. NG in place, LIS. Minimal ouptut, Abd rounded, BS hypo. Denies N/V. IV Dilaudid x1 for pain in nasal passage with improvement. Up A1/W/GB. Up in chair briefly. Tele NSR. IVF@100/hr. Surgery consulted. Nephrology following. Plans for Dialysis in AM.    NPO.

## 2017-10-22 NOTE — LETTER
Transition Communication Hand-off for Care Transitions to Next Level of Care Provider    Name: Kathryn Banks  MRN #: [id number]  Primary Care Provider: Dheeraj Jj     Primary Clinic: [facility] [address] [place] [address]     Reason for Hospitalization:  Complete intestinal obstruction, unspecified cause [K56.601]  Admit Date/Time: 10/22/2017  2:45 AM  Discharge Date: 11/1/2017  Payor Source: Payor: BCBS / Plan: BCBS PLATINUM BLUE / Product Type: PPO /     Readmission Assessment Measure (ANDREW) Risk Score/category: Extreme         Reason for Communication Hand-off Referral: Other Elevatred ANDREW and d/c to TCU    Discharge Plan: Discharge to [facility]        Concern for non-adherence with plan of care:   Y/N N   Discharge Needs Assessment:  Needs       Most Recent Value    Equipment Currently Used at Home walker, rolling, walker, standard, other (see comments), dressing device, raised toilet, grab bar, shower chair [Scooter, toilet safety frame, reacher]    Transportation Available car, family or friend will provide    # of Referrals Placed by CTS Post Acute Facilities          Already enrolled in Tele-monitoring program and name of program:  No   Follow-up specialty is recommended: No    Follow-up plan:  Future Appointments  Date Time Provider Department Center   11/2/2017 2:00 PM Cassie Cardenas MD [facility]   12/5/2017 12:30 PM [facility] [place]   12/5/2017 1:00 PM Vibha Barfield MD [facility]       Any outstanding tests or procedures:        Referrals     Future Labs/Procedures    Occupational Therapy Adult Consult     Comments:    Evaluate and treat as clinically indicated.    Reason:  Assistance with ADL's    Physical Therapy Adult Consult     Comments:    Evaluate and treat as clinically indicated.    Reason:  Weakness related to deconditioning            Key Recommendations:  Discharge today to TCU, [facility]. Resume Monday,  Wednesday and Friday dialysis in [place] ([facility])    Zahira Churchill    AVS/Discharge Summary is the source of truth; this is a helpful guide for improved communication of patient story

## 2017-10-22 NOTE — PROVIDER NOTIFICATION
MD Notification    Notified Person:  MD    Notified Persons Name: Dr. Fisher    Notification Date/Time: 10/22/17 @1232    Notification Interaction:  Text paged Physician    Purpose of Notification: Why is pt on Amiodarone bolus? Unable to manage on med/surg. PO meds w/NG clamped or needs CSC?    Orders Received: Amiodarone switched to po. Clamp NG x1hr after giving med.    Comments:

## 2017-10-22 NOTE — IP AVS SNAPSHOT
MRN:[id number]                      After Visit Summary   10/22/2017    Kathryn Banks    MRN: [id number]           Thank you!     Thank you for choosing [facility] for your care. Our goal is always to provide you with excellent care. Hearing back from our patients is one way we can continue to improve our services. Please take a few minutes to complete the written survey that you may receive in the mail after you visit with us. Thank you!        Patient Information     Date Of Birth          1940        Designated Caregiver       Most Recent Value    Caregiver    Will someone help with your care after discharge? yes [anticipate d/c to TCU, does have assistance at home]    Name of designated caregiver Christian Banks    Phone number of caregiver see facesheet    Caregiver address see facesheet      About your hospital stay     You were admitted on:  October 22, 2017 You last received care in the:  [place] [facility] [address] [facility]    You were discharged on:  November 1, 2017        Reason for your hospital stay       Small bowel obstruction.                  Who to Call     For medical emergencies, please call 911.  For non-urgent questions about your medical care, please call your primary care provider or clinic, [phone]  For questions related to your surgery, please call your surgery clinic        Attending Provider     Provider Specialty    Sienna Samaniego MD Emergency Medicine    Cara Fisher MD Internal Medicine       Primary Care Provider Office Phone # Fax #    Dheeraj Jj [phone]      After Care Instructions     Activity - Up ad beata           Additional Discharge Instructions       Call Dr. Stuart at Pager [phone] if questions, or Cell Phone [phone].            General info for SNF       Length of Stay Estimate: Short Term Care: Estimated # of Days <30  Condition at Discharge: Improving  Level of care:skilled   Rehabilitation  Potential: Good  Admission H&P remains valid and up-to-date: Yes  Recent Chemotherapy: N/A  Use Nursing Home Standing Orders: Yes            Glucose monitor nursing POCT       Glucometer check twice daily, call physician if > 200 x 3 consecutive checks.            Mantoux instructions       Give two-step Mantoux (PPD) Per Facility Policy Yes                  Follow-up Appointments     Follow Up and recommended labs and tests       Follow up with primary care provider 1 week after discharge from TCU.  Check INR in 2 days, and adjust warfarin dose for desired INR of 2.0 to 3.0.      Follow up with Dr. Rashard Zafar, Vascular Surgery, in 2 weeks.    Continue Dialysis Mon, Wed, Friday as arranged by Nephrology. Daniel in [place]. Dialysis days are Monday, Wednesday, Friday. Patient needs to arrive by 1030 am. The dialysis unit phone number is [phone].                  Your next 10 appointments already scheduled     Nov 02, 2017  2:00 PM CDT   (Arrive by 1:45 PM)   New Vascular Visit with Cassie Cardenas MD   [facility] ([facility] [place])    [address]  3rd Floor  [facility] [id number] [phone]            Dec 05, 2017 12:30 PM CST   LAB with LAB FIRST FLOOR [facility])    [address] [address] [place] [address] [phone]           Please do not eat 10-12 hours before your appointment if you are coming in fasting for labs on lipids, cholesterol, or glucose (sugar). This does not apply to pregnant women. Water, hot tea and black coffee (with nothing added) are okay. Do not drink other fluids, diet soda or chew gum.            Dec 05, 2017  1:00 PM CST   Return Visit with Vibha Barfield MD   [facility])    [address] [address] [facility] [address] [phone]              Additional Services     Occupational Therapy Adult Consult        "Evaluate and treat as clinically indicated.    Reason:  Assistance with ADL's            Physical Therapy Adult Consult       Evaluate and treat as clinically indicated.    Reason:  Weakness related to deconditioning                  Warfarin Instruction     You have started taking a medicine called warfarin. This is a blood-thinning medicine (anticoagulant). It helps prevent and treat blood clots.      Before leaving the hospital, make sure you know how much to take and how long to take it.      You will need regular blood tests to make sure your blood is clotting safely. It is very important to see your doctor for regular blood tests.    Talk to your doctor before taking any new medicine (this includes over-the-counter drugs and herbal products). Many medicines can interact with warfarin. This may cause more bleeding or too much clotting.     Eating a lot of vitamin K--found in green, leafy vegetables--can change the way warfarin works in your body. Do NOT avoid these foods. Instead, try to eat the same amount each day.     Bleeding is the most common side-effect of warfarin. You may notice bleeding gums, a bloody nose, bruises and bleeding longer when you cut yourself. See a doctor at once if:   o You cough up blood  o You find blood in your stool (poop)  o You have a deep cut, or a cut that bleeds longer than 10 minutes   o You have a bad cut, hard fall, accident or hit your head (go to urgent care or the emergency room).    For women who can get pregnant: This medicine can harm an unborn baby. Be very careful not to get pregnant while taking this medicine. If you think you might be pregnant, call your doctor right away.    For more information, read \"Guide to Warfarin Therapy,  the booklet you received in the hospital.        Pending Results     No orders found from 10/20/2017 to 10/23/2017.            Statement of Approval     Ordered          10/31/17 1994  I have reviewed and agree with all the " "recommendations and orders detailed in this document.  EFFECTIVE NOW     Approved and electronically signed by:  Emmanuel Stuart MD             Admission Information     Date & Time Provider Department Dept. Phone    10/22/2017 Cara Fisher MD [place] [address] [facility] [phone]      Your Vitals Were     Blood Pressure Pulse Temperature Respirations Height Weight    170/80 78 97.9  F (36.6  C) (Oral) 16 1.626 m (5' 4\") 70.4 kg (155 lb 3.3 oz)    Pulse Oximetry BMI (Body Mass Index)                95% 26.64 kg/m2          MyChart Information     [facility] lets you send messages to your doctor, view your test results, renew your prescriptions, schedule appointments and more. To sign up, go to [email or web address] . Click on \"Log in\" on the left side of the screen, which will take you to the Welcome page. Then click on \"Sign up Now\" on the right side of the page.     You will be asked to enter the access code listed below, as well as some personal information. Please follow the directions to create your username and password.     Your access code is: [email or web address]  Expires: 2017  4:04 PM     Your access code will  in 90 days. If you need help or a new code, please call your [place] clinic or [phone].        Care EveryWhere ID     This is your Care EveryWhere ID. This could be used by other organizations to access your [place] medical records  [id number]        Equal Access to Services     [facility] AH: Hadii aad ku hadasho Soomaali, waaxda luqadaha, qaybta kaalmada adeegyada, tay santizo . So [facility] [phone].    ATENCIÓN: Si habla español, tiene a medrano disposición servicios gratuitos de asistencia lingüística. Llame al [phone].    We comply with applicable federal civil rights laws and [place] laws. We do not discriminate on the basis of race, color, national origin, age, disability, sex, sexual orientation, or gender " identity.               Review of your medicines      START taking        Dose / Directions    famotidine 20 MG tablet   Commonly known as:  PEPCID        Dose:  20 mg   Take 1 tablet (20 mg) by mouth 2 times daily as needed   Quantity:  60 tablet   Refills:  1       hypromellose-dextran Soln ophthalmic solution   Used for:  Dry eyes        Dose:  1 drop   Place 1 drop into both eyes 3 times daily as needed for dry eyes   Refills:  0       LORazepam 0.5 MG tablet   Commonly known as:  ATIVAN   Used for:  Anxiety        Dose:  0.25 mg   Take 0.5 tablets (0.25 mg) by mouth every 8 hours as needed for anxiety   Quantity:  20 tablet   Refills:  0       metoprolol 25 MG tablet   Commonly known as:  LOPRESSOR   Used for:  Atrial flutter, unspecified type (H)        Dose:  12.5 mg   Take 0.5 tablets (12.5 mg) by mouth 2 times daily   Quantity:  60 tablet   Refills:  0         CONTINUE these medicines which may have CHANGED, or have new prescriptions. If we are uncertain of the size of tablets/capsules you have at home, strength may be listed as something that might have changed.        Dose / Directions    warfarin 2 MG tablet   Commonly known as:  COUMADIN   This may have changed:    - medication strength  - how much to take  - how to take this  - when to take this  - additional instructions   Used for:  Atrial flutter, unspecified type (H)        1 mg daily for aflut and adjust for desired INR 2.0 to 3.0   Quantity:  30 tablet   Refills:  0         CONTINUE these medicines which have NOT CHANGED        Dose / Directions    ACETAMINOPHEN PO        Dose:  650 mg   Take 650 mg by mouth every 4 hours as needed for pain For pain 1-5 out of 10   Refills:  0       AMIODARONE HCL PO        Dose:  200 mg   Take 200 mg by mouth 2 times daily   Refills:  0       calcium acetate 667 MG Caps capsule   Commonly known as:  PHOSLO   Used for:  Chronic kidney disease, unspecified CKD stage        Dose:  667 mg   Take 1 capsule (667 mg)  by mouth 3 times daily (with meals)   Quantity:  180 capsule   Refills:  0       cholecalciferol 5000 UNITS Caps   Used for:  CKD (chronic kidney disease) stage 5, GFR less than 15 ml/min (H)        Dose:  5000 Units   Take 1 capsule (5,000 Units) by mouth daily   Refills:  3       fluticasone 50 MCG/ACT spray   Commonly known as:  FLONASE   Used for:  Nasal congestion        Dose:  1 spray   Spray 1 spray into both nostrils daily   Quantity:  1 Bottle   Refills:  0       ipratropium - albuterol 0.5 mg/2.5 mg/3 mL 0.5-2.5 (3) MG/3ML neb solution   Commonly known as:  DUONEB        Dose:  1 vial   Take 1 vial by nebulization 3 times daily   Refills:  0       nitroGLYcerin 0.4 MG sublingual tablet   Commonly known as:  NITROSTAT   Used for:  Hx of non-ST elevation myocardial infarction (NSTEMI), Atherosclerosis of native coronary artery of native heart without angina pectoris, Postsurgical aortocoronary bypass status        Dose:  0.4 mg   Place 1 tablet (0.4 mg) under the tongue every 5 minutes as needed for chest pain   Quantity:  25 tablet   Refills:  0       nystatin 125185 UNIT/GM Powd   Commonly known as:  MYCOSTATIN        Apply topically 2 times daily as needed   Refills:  0       order for DME   Used for:  ANABEL (obstructive sleep apnea)        Equipment being ordered: cpap machine, mask, humidifier, tubing and filters   Quantity:  1 Device   Refills:  0       pentoxifylline 400 MG CR tablet   Commonly known as:  TRENtal   Used for:  Venous stasis ulcer (H)        Dose:  400 mg   Take 1 tablet (400 mg) by mouth daily   Refills:  0       pramipexole 0.125 MG tablet   Commonly known as:  MIRAPEX   Used for:  Restless leg syndrome        Dose:  0.125 mg   Take 1 tablet (0.125 mg) by mouth 3 times daily   Quantity:  90 tablet   Refills:  3       pravastatin 40 MG tablet   Commonly known as:  PRAVACHOL   Used for:  Hx of non-ST elevation myocardial infarction (NSTEMI), Atherosclerosis of native coronary artery of  native heart without angina pectoris, Type 2 diabetes mellitus with other circulatory complications        Dose:  40 mg   Take 1 tablet (40 mg) by mouth daily   Quantity:  90 tablet   Refills:  3         STOP taking     acidophilus tablet           alum & mag hydroxide-simethicone 400-400-40 MG/5ML Susp suspension   Commonly known as:  MYLANTA ES/MAALOX  ES           Calcium Carb-Cholecalciferol 500-400 MG-UNIT Tabs           clonazePAM 0.5 MG tablet   Commonly known as:  klonoPIN           darbepoetin hira 60 MCG/0.3ML injection   Commonly known as:  ARANESP (ALBUMIN FREE)           sodium polystyrene 15 GM/60ML suspension   Commonly known as:  KAYEXALATE           ZOFRAN ODT 4 MG ODT tab   Generic drug:  ondansetron                Where to get your medicines      Some of these will need a paper prescription and others can be bought over the counter. Ask your nurse if you have questions.     Bring a paper prescription for each of these medications     LORazepam 0.5 MG tablet       You don't need a prescription for these medications     famotidine 20 MG tablet    hypromellose-dextran Soln ophthalmic solution    metoprolol 25 MG tablet    warfarin 2 MG tablet                Protect others around you: Learn how to safely use, store and throw away your medicines at www.disposemymeds.org.             Medication List: This is a list of all your medications and when to take them. Check marks below indicate your daily home schedule. Keep this list as a reference.      Medications           Morning Afternoon Evening Bedtime As Needed    ACETAMINOPHEN PO   Take 650 mg by mouth every 4 hours as needed for pain For pain 1-5 out of 10                                AMIODARONE HCL PO   Take 200 mg by mouth 2 times daily   Last time this was given:  200 mg on 11/1/2017  8:23 AM                                calcium acetate 667 MG Caps capsule   Commonly known as:  PHOSLO   Take 1 capsule (667 mg) by mouth 3 times daily (with  meals)                                cholecalciferol 5000 UNITS Caps   Take 1 capsule (5,000 Units) by mouth daily                                famotidine 20 MG tablet   Commonly known as:  PEPCID   Take 1 tablet (20 mg) by mouth 2 times daily as needed                                fluticasone 50 MCG/ACT spray   Commonly known as:  FLONASE   Spray 1 spray into both nostrils daily   Last time this was given:  1 spray on 11/1/2017  8:24 AM                                hypromellose-dextran Soln ophthalmic solution   Place 1 drop into both eyes 3 times daily as needed for dry eyes   Last time this was given:  1 drop on 10/30/2017  1:57 AM                                ipratropium - albuterol 0.5 mg/2.5 mg/3 mL 0.5-2.5 (3) MG/3ML neb solution   Commonly known as:  DUONEB   Take 1 vial by nebulization 3 times daily   Last time this was given:  3 mLs on 10/23/2017 12:17 PM                                LORazepam 0.5 MG tablet   Commonly known as:  ATIVAN   Take 0.5 tablets (0.25 mg) by mouth every 8 hours as needed for anxiety                                metoprolol 25 MG tablet   Commonly known as:  LOPRESSOR   Take 0.5 tablets (12.5 mg) by mouth 2 times daily   Last time this was given:  12.5 mg on 11/1/2017  1:15 PM                                nitroGLYcerin 0.4 MG sublingual tablet   Commonly known as:  NITROSTAT   Place 1 tablet (0.4 mg) under the tongue every 5 minutes as needed for chest pain                                nystatin 629334 UNIT/GM Powd   Commonly known as:  MYCOSTATIN   Apply topically 2 times daily as needed                                order for DME   Equipment being ordered: cpap machine, mask, humidifier, tubing and filters                                pentoxifylline 400 MG CR tablet   Commonly known as:  TRENtal   Take 1 tablet (400 mg) by mouth daily                                pramipexole 0.125 MG tablet   Commonly known as:  MIRAPEX   Take 1 tablet (0.125 mg) by mouth 3  times daily   Last time this was given:  0.125 mg on 11/1/2017  8:23 AM                                pravastatin 40 MG tablet   Commonly known as:  PRAVACHOL   Take 1 tablet (40 mg) by mouth daily                                warfarin 2 MG tablet   Commonly known as:  COUMADIN   1 mg daily for aflut and adjust for desired INR 2.0 to 3.0   Last time this was given:  1 mg on 10/31/2017  7:44 PM

## 2017-10-23 ENCOUNTER — APPOINTMENT (OUTPATIENT)
Dept: GENERAL RADIOLOGY | Facility: CLINIC | Age: 77
DRG: 329 | End: 2017-10-23
Attending: SURGERY
Payer: MEDICARE

## 2017-10-23 ENCOUNTER — CARE COORDINATION (OUTPATIENT)
Dept: CARE COORDINATION | Facility: CLINIC | Age: 77
End: 2017-10-23

## 2017-10-23 LAB
ANION GAP SERPL CALCULATED.3IONS-SCNC: 10 MMOL/L (ref 3–14)
BUN SERPL-MCNC: 30 MG/DL (ref 7–30)
CALCIUM SERPL-MCNC: 7.9 MG/DL (ref 8.5–10.1)
CHLORIDE SERPL-SCNC: 100 MMOL/L (ref 94–109)
CO2 SERPL-SCNC: 28 MMOL/L (ref 20–32)
CREAT SERPL-MCNC: 4.96 MG/DL (ref 0.52–1.04)
ERYTHROCYTE [DISTWIDTH] IN BLOOD BY AUTOMATED COUNT: 16.1 % (ref 10–15)
GFR SERPL CREATININE-BSD FRML MDRD: 9 ML/MIN/1.7M2
GLUCOSE BLDC GLUCOMTR-MCNC: 103 MG/DL (ref 70–99)
GLUCOSE BLDC GLUCOMTR-MCNC: 107 MG/DL (ref 70–99)
GLUCOSE BLDC GLUCOMTR-MCNC: 223 MG/DL (ref 70–99)
GLUCOSE BLDC GLUCOMTR-MCNC: 64 MG/DL (ref 70–99)
GLUCOSE BLDC GLUCOMTR-MCNC: 77 MG/DL (ref 70–99)
GLUCOSE BLDC GLUCOMTR-MCNC: 93 MG/DL (ref 70–99)
GLUCOSE BLDC GLUCOMTR-MCNC: 94 MG/DL (ref 70–99)
GLUCOSE SERPL-MCNC: 66 MG/DL (ref 70–99)
HCT VFR BLD AUTO: 30.7 % (ref 35–47)
HGB BLD-MCNC: 9.4 G/DL (ref 11.7–15.7)
INR PPP: 1.16 (ref 0.86–1.14)
MCH RBC QN AUTO: 27.8 PG (ref 26.5–33)
MCHC RBC AUTO-ENTMCNC: 30.6 G/DL (ref 31.5–36.5)
MCV RBC AUTO: 91 FL (ref 78–100)
PLATELET # BLD AUTO: 324 10E9/L (ref 150–450)
POTASSIUM SERPL-SCNC: 4.1 MMOL/L (ref 3.4–5.3)
RBC # BLD AUTO: 3.38 10E12/L (ref 3.8–5.2)
SODIUM SERPL-SCNC: 138 MMOL/L (ref 133–144)
WBC # BLD AUTO: 4.7 10E9/L (ref 4–11)

## 2017-10-23 PROCEDURE — 94640 AIRWAY INHALATION TREATMENT: CPT

## 2017-10-23 PROCEDURE — 99233 SBSQ HOSP IP/OBS HIGH 50: CPT | Performed by: INTERNAL MEDICINE

## 2017-10-23 PROCEDURE — 85610 PROTHROMBIN TIME: CPT | Performed by: INTERNAL MEDICINE

## 2017-10-23 PROCEDURE — 74020 XR ABDOMEN 2 VW: CPT

## 2017-10-23 PROCEDURE — 5A1D70Z PERFORMANCE OF URINARY FILTRATION, INTERMITTENT, LESS THAN 6 HOURS PER DAY: ICD-10-PCS | Performed by: INTERNAL MEDICINE

## 2017-10-23 PROCEDURE — A9270 NON-COVERED ITEM OR SERVICE: HCPCS | Mod: GY | Performed by: INTERNAL MEDICINE

## 2017-10-23 PROCEDURE — 63400005 ZZH RX 634: Performed by: INTERNAL MEDICINE

## 2017-10-23 PROCEDURE — 90937 HEMODIALYSIS REPEATED EVAL: CPT

## 2017-10-23 PROCEDURE — 12000000 ZZH R&B MED SURG/OB

## 2017-10-23 PROCEDURE — 25000128 H RX IP 250 OP 636: Performed by: INTERNAL MEDICINE

## 2017-10-23 PROCEDURE — 25000132 ZZH RX MED GY IP 250 OP 250 PS 637: Mod: GY | Performed by: INTERNAL MEDICINE

## 2017-10-23 PROCEDURE — 36415 COLL VENOUS BLD VENIPUNCTURE: CPT | Performed by: INTERNAL MEDICINE

## 2017-10-23 PROCEDURE — 40000141 ZZH STATISTIC PERIPHERAL IV START W/O US GUIDANCE

## 2017-10-23 PROCEDURE — 80048 BASIC METABOLIC PNL TOTAL CA: CPT | Performed by: INTERNAL MEDICINE

## 2017-10-23 PROCEDURE — 25000131 ZZH RX MED GY IP 250 OP 636 PS 637: Mod: GY | Performed by: INTERNAL MEDICINE

## 2017-10-23 PROCEDURE — 99207 ZZC CDG-MDM COMPONENT: MEETS MODERATE - UP CODED: CPT | Performed by: INTERNAL MEDICINE

## 2017-10-23 PROCEDURE — 40000275 ZZH STATISTIC RCP TIME EA 10 MIN

## 2017-10-23 PROCEDURE — 00000146 ZZHCL STATISTIC GLUCOSE BY METER IP

## 2017-10-23 PROCEDURE — 94640 AIRWAY INHALATION TREATMENT: CPT | Mod: 76

## 2017-10-23 PROCEDURE — 25000125 ZZHC RX 250: Performed by: INTERNAL MEDICINE

## 2017-10-23 PROCEDURE — 85027 COMPLETE CBC AUTOMATED: CPT | Performed by: INTERNAL MEDICINE

## 2017-10-23 PROCEDURE — 99231 SBSQ HOSP IP/OBS SF/LOW 25: CPT | Mod: 57 | Performed by: SURGERY

## 2017-10-23 RX ORDER — LORAZEPAM 2 MG/ML
0.25 INJECTION INTRAMUSCULAR DAILY PRN
Status: DISCONTINUED | OUTPATIENT
Start: 2017-10-23 | End: 2017-11-01 | Stop reason: HOSPADM

## 2017-10-23 RX ORDER — IPRATROPIUM BROMIDE AND ALBUTEROL SULFATE 2.5; .5 MG/3ML; MG/3ML
1 SOLUTION RESPIRATORY (INHALATION) EVERY 4 HOURS PRN
Status: DISCONTINUED | OUTPATIENT
Start: 2017-10-23 | End: 2017-11-01 | Stop reason: HOSPADM

## 2017-10-23 RX ADMIN — FLUTICASONE PROPIONATE 1 SPRAY: 50 SPRAY, METERED NASAL at 09:16

## 2017-10-23 RX ADMIN — METOPROLOL TARTRATE 5 MG: 5 INJECTION INTRAVENOUS at 18:40

## 2017-10-23 RX ADMIN — IPRATROPIUM BROMIDE AND ALBUTEROL SULFATE 3 ML: .5; 3 SOLUTION RESPIRATORY (INHALATION) at 12:17

## 2017-10-23 RX ADMIN — LORAZEPAM 0.25 MG: 2 INJECTION INTRAMUSCULAR; INTRAVENOUS at 14:38

## 2017-10-23 RX ADMIN — SODIUM CHLORIDE, PRESERVATIVE FREE: 5 INJECTION INTRAVENOUS at 06:16

## 2017-10-23 RX ADMIN — INSULIN ASPART 2 UNITS: 100 INJECTION, SOLUTION INTRAVENOUS; SUBCUTANEOUS at 13:56

## 2017-10-23 RX ADMIN — AMIODARONE HYDROCHLORIDE 200 MG: 200 TABLET ORAL at 09:16

## 2017-10-23 RX ADMIN — DEXTROSE 15 G: 15 GEL ORAL at 08:38

## 2017-10-23 RX ADMIN — PANTOPRAZOLE SODIUM 40 MG: 40 INJECTION, POWDER, FOR SOLUTION INTRAVENOUS at 06:52

## 2017-10-23 RX ADMIN — Medication 2 LOZENGE: at 13:55

## 2017-10-23 RX ADMIN — EPOETIN ALFA 2000 UNITS: 2000 SOLUTION INTRAVENOUS; SUBCUTANEOUS at 14:56

## 2017-10-23 RX ADMIN — METOPROLOL TARTRATE 5 MG: 5 INJECTION INTRAVENOUS at 06:52

## 2017-10-23 RX ADMIN — METOPROLOL TARTRATE 5 MG: 5 INJECTION INTRAVENOUS at 01:45

## 2017-10-23 RX ADMIN — IPRATROPIUM BROMIDE AND ALBUTEROL SULFATE 3 ML: .5; 3 SOLUTION RESPIRATORY (INHALATION) at 07:49

## 2017-10-23 RX ADMIN — SODIUM CHLORIDE 250 ML: 9 INJECTION, SOLUTION INTRAVENOUS at 14:57

## 2017-10-23 RX ADMIN — HYDROMORPHONE HYDROCHLORIDE 0.5 MG: 1 INJECTION, SOLUTION INTRAMUSCULAR; INTRAVENOUS; SUBCUTANEOUS at 19:13

## 2017-10-23 RX ADMIN — AMIODARONE HYDROCHLORIDE 200 MG: 200 TABLET ORAL at 20:35

## 2017-10-23 RX ADMIN — MICONAZOLE NITRATE: 2 POWDER TOPICAL at 20:43

## 2017-10-23 RX ADMIN — MICONAZOLE NITRATE: 2 POWDER TOPICAL at 09:17

## 2017-10-23 NOTE — PROGRESS NOTES
Potassium   Date Value Ref Range Status   10/23/2017 4.1 3.4 - 5.3 mmol/L Final   ]  Lab Results   Component Value Date    HGB 9.4 10/23/2017     Weight: 65.8 kg (145 lb 1 oz)  POST WT  DIALYSIS PROCEDURE NOTE  Hepatitis status of previous patient on machine log was checked and ok to use with this patient hepatitis status.  Patient dialyzed for 3 hrs on a 3 K bath with a  net fluid removal of 0.5L.  A BFR of 400ml/min was obtained via RIJ.  Patient was seen by Dr. Rich during treatment.  Total heparin received during treatment:: 0 units.  Heparin instilled in both ports post run.  Meds given: Epogen. Complications: None.   Procedure and ESRD teaching done and questions answered.  See flowsheet in EPIC for further details and post assessment.  Machine water alarm in place and functioning.  HEPATITIS B SURFACE ANTIGEN Non-Reactive Date 10/6/17   HEPATITIS B SURFACE ANTIBODY Immune DATE 10/6/17  CHLORINE AND CHLORAMINES CHECK on WATER SYSTEM EVERY 4 hours.   PT dialyzed at bedside for isolation precautions  Catheter Access: Aseptic prep done for both on/off.  Report received from: TEAGAN Noyola RN  Report given to: DENAE West RN  Outpatient Dialysis at [facility]

## 2017-10-23 NOTE — PROGRESS NOTES
SURGERY    Pt still feels fine this PM.. Taking liquids and wants a regular diet.  Still no flatus      ABD X-Ray at 1230 with air dilated loops of small bowel  (decompressed colon).   Tip of NG barly in stomach.      PLAN:  Advance NG.              If SBO does not resolve by tomorrow -- to OR                    Initial CT wound imply that unlikely to resolve on own.       Rashard Zafar MD

## 2017-10-23 NOTE — CONSULTS
"CLINICAL NUTRITION SERVICES  -  ASSESSMENT NOTE      Future/Additional Recommendations:   If PO intake decreases, would recommend Ensure Clear 2-3 times daily   Malnutrition: Unable to determine due to unable to meet with pt. Pt busy, x 2 attempts        REASON FOR ASSESSMENT  Kathryn Banks is a 77 year old female seen by Registered Dietitian for Admission Nutrition Risk Screen - unintentional wt loss of 10lbs or more over the last 2 months       NUTRITION HISTORY  - Information obtained from chart review: unable to meet with pt, pt busy x 2 attempts  Pt familiar to our service was last seen on 10/17/17:   Per past RD notes, pt does not care for Nepro       CURRENT NUTRITION ORDERS  Diet Order:     Clear Liquid       Current Intake/Tolerance:  -Per RN flowsheet, pt consuming 100% of meals ordered   -IVF at 60mL/hr  -BGM range  (MSSI)      PHYSICAL FINDINGS  Observed  No nutrition-related physical findings observed  Obtained from Chart/Interdisciplinary Team  -Per MD, pt with abdominal pain and distention     ANTHROPOMETRICS  Height: 5'4\"  Weight: 145 lbs 1 oz (65.8 kg )  Body mass index is 24.9 kg/(m^2).  Weight Status:  Overweight BMI 25-29.9  IBW: 54.5 kg   % IBW: 121%  Weight History: wt trending down, wt loss of 10.4 kg (15.8%) over the last month   Wt Readings from Last 10 Encounters:   10/23/17 65.8 kg (145 lb 1 oz)   10/18/17 62.2 kg (137 lb 2 oz)   10/01/17 77.3 kg (170 lb 8 oz)   09/22/17 77.1 kg (170 lb)   09/21/17 76.2 kg (168 lb)   09/19/17 76.5 kg (168 lb 11.2 oz)   09/15/17 76.1 kg (167 lb 11.2 oz)   09/13/17 78.7 kg (173 lb 6.4 oz)   09/11/17 78.4 kg (172 lb 12.8 oz)   09/08/17 75.4 kg (166 lb 4.8 oz)       LABS  Labs reviewed    MEDICATIONS  Medications reviewed    Dosing Weight 65.8 kg (actual wt)     ASSESSED NUTRITION NEEDS PER APPROVED PRACTICE GUIDELINES:  Estimated Energy Needs: 9265-1292 kcals (25-30 Kcal/Kg)  Justification: maintenance  Estimated Protein Needs: 79-99 grams protein " (1.2-1.5 g pro/Kg)  Justification: maintenance  Estimated Fluid Needs: 6791-1808 mL (25-30 mL/kg)  Justification: maintenance    MALNUTRITION:  % Weight Loss:  > 5% in 1 month (severe malnutrition)  % Intake:  Difficult to assess at this time   Subcutaneous Fat Loss:  Unable to assess   Muscle Loss:  Unable to assess   Fluid Retention:  None noted    Malnutrition Diagnosis: Unable to determine due to unable to meet with pt. Pt busy, x 2 attempts      NUTRITION DIAGNOSIS:  Unintended weight loss related to suspect decreased PO intake and altered GI as evidenced by wt loss of 10.4 kg (15.8%) over the last month     NUTRITION INTERVENTIONS  Recommendations / Nutrition Prescription  Advance diet as medically able  Meals TID      Implementation  Nutrition education: Not appropriate at this time     Nutrition Goals  Pt to consume at least 75% of meals ordered       MONITORING AND EVALUATION:  Progress towards goals will be monitored and evaluated per protocol and Practice Guidelines      Sweta Ball, RD

## 2017-10-23 NOTE — PROGRESS NOTES
Surgery Progress Note    S: No abdominal pain.  No flatus or stool. Wants to eat.    O:   Vitals:  BP  Min: 102/63  Max: 131/79  Temp  Av.9  F (36.6  C)  Min: 97.4  F (36.3  C)  Max: 98.2  F (36.8  C)  Pulse  Av.3  Min: 76  Max: 95  I/O last 3 completed shifts:  In: 500 [I.V.:500]  Out: 350 [Urine:200; Emesis/NG output:150]    Physical Exam: very comfortable   NG= 150 ml only                             Abd= much less distended, very soft and non-tender                                      Active BS      Low Glucose last PM and this AM (77)      Assessment/Plan:  SBO-- ? Better since no flatus/stool.                                  Very benign exam and thus will continue conservative treatment.                                   Dialysis this AM                                   Coumadin on hold and Vit K last PM.                                    Pt wants popsicle and sips-- OK                                      We will continue to follow.      Wm. Andrade MD

## 2017-10-23 NOTE — PROGRESS NOTES
General Surgery    No flatus or stool yet. Minimal NG output - 150ml thin light brown fluid x 24 hours. Discussed option of NG removal, patient very hesitant and nervous about potentially needing NG replaced. We will go ahead with clamping trial today and allow sips of clear liquids. If this goes well and she does not have any nausea or increased pain can remove NG this afternoon/evening. Repeat abdominal XR today. Patient agrees with this plan.       Zo Shay PA-C  [facility]  [phone]

## 2017-10-23 NOTE — PLAN OF CARE
Problem: Patient Care Overview  Goal: Plan of Care/Patient Progress Review  Outcome: Improving  A&O x4. VSS on RA-2L O2 NC. Up w/1, walker, GB. LS diminished, denies SOB. BS+ in all quadrants, denies gas, denies nausea; diet advanced to clear liquids, tolerating well with good appetite, NG to low intermittent suction, clamped at 0830; remove this evening if still tolerating PO intake. BG 62 at 0825, 15 mg glucose gel given, resolved to 103;  at 1230. Tele NSR. Repeat abd xray today. Dialysis today, 0.25mg Ativan requested and given prior to dialysis. SW/OT/PT ordered today.

## 2017-10-23 NOTE — PLAN OF CARE
Problem: Patient Care Overview  Goal: Plan of Care/Patient Progress Review  Outcome: No Change  A&Ox4, VSS on 2LNC.Tele NSRUp A1/W/GB.LS diminished. NG in place, LIS. Minimal ouptut, Abd rounded, BS hypo. Denies N/V or abd pain. amb in the hallsX2, NPO. IV Dilaudid x1 for HA and throat pain 2/2 NG tube.IVF@60/hr./103, q4h. Surgery consulted. Nephrology following. Plans for Dialysis in AM. Repeat abd xray at am. D/c pending progress.

## 2017-10-23 NOTE — PROGRESS NOTES
[facility]    Internal Medicine Hospitalist Progress Note  10/23/2017  I evaluated patient on the above date.    Juanjose Frost Jr., MD  [phone] (p)  Text Page (7 am to 6 pm)      Assessment & Plan   Ms. Kathryn Banks is a 77-year-old female with history including CAD, HTN, diet controlled DM, afib/flutter, ESRD (started HD last hospitalization), ANABEL, RLS, C. Diff, and recent hospitalizations, most recently at [facility] (10/2-10/18) for issues including pneumonia with septic shock and acute respiratory failure. She presented from TCU 10/22 with abdominal pain and distension with signs of SBO on imaging.    SBO.  CT 10/22 showed closed-loop SBO. NG placed 10/22. Seen by Surgery and NG clamped 10/23.  - Continue clamping trial per Surgery.  - Continue NPO  - Continue PRN analgesics and PRN anti-emetics.  - Appreciate help from Surgery.    Recent hospitalization with pneumonia with acute respiratory failure.  Finished antibiotics PTA.  - Monitor clinically.  - Change Duonebs to PRN.  - Wean O2 as able.    Atrial fibrillation/flutter.  H/o atrial flutter ablation 6/2017. Also h/o cardioversion. Issues with afib with RVR last hospitalization (10/2-10/18); started on amiodarone by Cardiology last stay.  - Continue amiodarone.  - Continue metoprolol 5 mg IV q6h.  - Holding PTA warfarin for now.    ESRD on HD.  H/o CKD and was already planning for HD before last hospitalization. Last hospitalization was initiated on HD.  - Continue HD qMWF per Nephrology.     CAD.  Prior CABG 2007. Angiogram 4/2010 showed patent grafts with the exception of VG to RCA which was 100% occluded.  - Resume pravastatin as able.    H/o benign essential HTN.  Diastolic CHF.  Last echo 10/3/2017 showed LVEF 60-65%. Prior to last admission was on amlodipine, metoprolol and torsemide. Issues with septic shock last stay as well as ARF on CKD and starting dialysis. Not on any BP meds or diuretics prior to this admission 10/22.   - Monitor  BP's.  - Monitor i/o's, daily wts.    DM2, diet controlled.  Hgb A1C 6.6 10/13/2017.  - NPO now.  - Continue ISS.    ANABEL.  - Continue CPAP.    RLS.  - Resume PTA pramipexole as able.    Anemia, suspect chronic component.  No overt clinical signs of bleeding.   Recent Labs  Lab 10/23/17  0809 10/22/17  0305   HGB 9.4* 10.4*   - Continue PTA Aranesp per Nephrology.  - Monitor CBC.  - Consider prbc transfusion if hgb <8.0 or if significant bleeding with hemodynamic instability or if symptomatic.    ?PAD.  LACI studies 8/2017 showed abnormal LACI 0.64 L first digit; otherwise LACI normal bilaterally. PTA on Trental, though pt denies any claudication type symptoms.  - Resume Trental as able.    Deconditioning.  - Continue PT and OT.    Prophylaxis.  - PCD's, ambulation.  - IV Protonix.    CODE STATUS: FULL.    Dispo.  - Pending above.    Interval History   Doing a bit better. No flatus or BM yet. No significant abdominal pain/discomfort.    -Data reviewed today: I reviewed all new labs and imaging over the last 24 hours. I personally reviewed no images or EKG's today.    Physical Exam    , Blood pressure 97/49, pulse 81, temperature 98.1  F (36.7  C), temperature source Oral, resp. rate 16, weight 65.8 kg (145 lb), SpO2 97 %, not currently breastfeeding.  Vitals:    10/22/17 0247   Weight: 65.8 kg (145 lb)     Vital Signs with Ranges  Temp:  [97.4  F (36.3  C)-98.2  F (36.8  C)] 98.1  F (36.7  C)  Pulse:  [76-94] 81  Resp:  [16] 16  BP: ()/(49-74) 97/49  SpO2:  [88 %-98 %] 97 %  Patient Vitals for the past 24 hrs:   BP Temp Temp src Pulse Resp SpO2   10/23/17 1305 - - - - - 97 %   10/23/17 1303 97/49 - - 81 - (!) 88 %   10/23/17 1217 - - - - - 93 %   10/23/17 1133 - - - - - 92 %   10/23/17 1057 - - - - - 94 %   10/23/17 1055 - - - - - 96 %   10/23/17 0825 - - - - - 96 %   10/23/17 0819 120/65 98.1  F (36.7  C) Oral 83 16 97 %   10/23/17 0749 - - - - - 98 %   10/23/17 0654 126/74 - - 79 - -   10/23/17 0144 115/68 - -  76 - -   10/23/17 0019 118/69 97.9  F (36.6  C) Oral - 16 98 %   10/22/17 1919 123/73 97.4  F (36.3  C) Oral 94 16 97 %   10/22/17 1700 - - - - - 90 %   10/22/17 1524 113/66 98.2  F (36.8  C) Oral - 16 98 %   10/22/17 1320 - - - - 16 -   10/22/17 1316 - - - - - 96 %     I/O's Last 24 hours  I/O last 3 completed shifts:  In: 500 [I.V.:500]  Out: 350 [Urine:200; Emesis/NG output:150]    Constitutional: Alert, oriented, pleasant.  Respiratory: Diminished in bases. No crackles or wheezes.  Cardiovascular: RRR no m/r/g.  GI: Mild distension, few BS, no high pitched sounds, nt.  Skin/Integumen: No lower extremity edema.  Other:        Data     Recent Labs  Lab 10/23/17  0809 10/22/17  2115 10/22/17  0305   WBC 4.7  --  7.4   HGB 9.4*  --  10.4*   MCV 91  --  88     --  357   INR 1.16* 1.45* 3.19*     --  136   POTASSIUM 4.1  --  4.1   CHLORIDE 100  --  96   CO2 28  --  30   BUN 30  --  21   CR 4.96*  --  3.91*   ANIONGAP 10  --  10   MALICK 7.9*  --  8.2*   GLC 66*  --  110*   ALBUMIN  --   --  2.5*   PROTTOTAL  --   --  6.2*   BILITOTAL  --   --  0.3   ALKPHOS  --   --  180*   ALT  --   --  9   AST  --   --  14   LIPASE  --   --  124     Recent Labs   Lab Test  10/23/17   1233  10/23/17   0915  10/23/17   0824  10/23/17   0809  10/23/17   0434  10/23/17   0016   10/22/17   0305   10/16/17   0545   10/15/17   0700   10/14/17   0539   GLC   --    --    --   66*   --    --    --   110*   --   102*   --   94   --   263*   BGM  223*  103*  64*   --   77  94   < >   --    < >   --    < >   --    < >   --     < > = values in this interval not displayed.         No results found for this or any previous visit (from the past 24 hour(s)).    Medications   All medications were reviewed.    - MEDICATION INSTRUCTIONS -       NaCl 60 mL/hr at 10/23/17 0616       sodium chloride 0.9%  250 mL Hemodialysis Machine Once     epoetin hira (EPOGEN,PROCRIT) inj ESRD  2,000 Units Intravenous See Admin Instructions     fluticasone  1  spray Both Nostrils Daily     ipratropium - albuterol 0.5 mg/2.5 mg/3 mL  1 vial Nebulization TID     pantoprazole  40 mg Intravenous QAM AC     metoprolol  5 mg Intravenous Q6H     miconazole   Topical BID     amiodarone (PACERONE/CODARONE) tablet 200 mg  200 mg Oral BID     - MEDICATION INSTRUCTIONS for Dialysis Patients -   Does not apply See Admin Instructions     insulin aspart  1-6 Units Subcutaneous Q4H

## 2017-10-23 NOTE — PROGRESS NOTES
Care Transition Initial Assessment - SW     Met with: patient  Active Problems:    SBO (small bowel obstruction)         DATA  Lives With: spouse, grandchild(zoe), friend(s)   Patient [facility] [place].   She transferred to [facility] following a [facility] stay from 10/2 to 10/18.  Prior to 10/2 she was living in [place] with her  and was open to [facility] for nursing visits three times per week-per patient.   Patient is a dialysis patient at [facility] in [place] [phone].  Identified issues/concerns regarding health management:  Came for a TCU.  Writer met with patient at her request.  She shares she is not holding her room at [facility].  Patient prefers to discharge home rather than return to the TCU and is seeking health team advice. She identifies family/friends to support her and that her shower is accessible.   If return to TCU is recommended she would want to return to [facility] and she asked writer to speak with Karrie at [facility] [phone].  A message was left an a referral sent thru DOD in case a return to TCU is recommended.  Patient reports she was at [facility] to rehab from her renal condition and receive therapy for strengthening.  Suggested to patient that it will be worthwhile for PT to assess patient and give their recommendation.  Discussed with care coordinator who will obtain a PT evaluation.      Patient also asked for resources to assist with transportation from her [facility] to [place]. \[facility].\"". While at [facility] she used a community transport program  but the program does not drive to [place].  She explains [place] [facility].  She doesn't want her  to drive her and wait for her at dialysis.  She has used private [employer] in the past for rides to [place] for her  and the bill was $200.00.  Writer suggested speaking with the [facility] as she would  know if more reasonable transport is available in their area.  Patient requested the number for [employer] and said she would call the SW.   Writer also suggested she check with her home town to ask if there are any volunteer programs or low cost transport programs geared for medical needs.    ASSESSMENT  Cognitive Status:  Appears alert and oreinted x4.  Is active in decisions regarding her d/c plan.  Concerns to be addressed: Patient would like to d/c home directly and is seeking professionals input.  MD/ PT assessment/recommendation will helpful.     PLAN  Financial costs for the patient includes to be determined.  Patient given options and choices for discharge yes  Patient/family is agreeable to the plan?  Patient receptive to hearing recommendation of MD and PT  Patient Goals and Preferences: to return home on Tuesday.  Patient anticipates discharging to: home vs TCU   Plan:  Will await MD and PT recommendation.

## 2017-10-23 NOTE — PROGRESS NOTES
Inpatient Dialysis Progress Note            Assessment and Plan:     Admitted with concern for SBO     Seen regarding management of ESRD     1.  ESRD                        -MWF schedule                        -ran here 10/18/17                        -[facility] 10/20/17  2.  Initiated dialysis last admission                        -tunneled line  3.  Anemia  4.  Secondary hyperparathyroidism  5.  SBO - NG still in place though improving.    Plan:    Minimal UF  Next HD Wednesday.           Interval History:     Some nausea but no vomiting.  NG still in place.  Not SOB.        Dialysis Parameters:     Wt Readings from Last 4 Encounters:   10/23/17 65.8 kg (145 lb 1 oz)   10/18/17 62.2 kg (137 lb 2 oz)   10/01/17 77.3 kg (170 lb 8 oz)   09/22/17 77.1 kg (170 lb)     I/O last 3 completed shifts:  In: 811 [I.V.:811]  Out: 350 [Urine:200; Emesis/NG output:150]  BP Readings from Last 3 Encounters:   10/23/17 122/63   10/20/17 117/64   10/18/17 112/66       Routine, ONE TIME, Starting today For 1 Occurrences  Weight Loss (kg): 0.5  Dialysis Temp: 36.5  C  Access Device: CVC  Access Site: R IJ  Dialyzer: Revaclear  Dialysis Bath: K 3  Blood Flow Rate (mL/min): 400  Total Treatment Time (hrs): 3  Heparin: none         Medications and Allergies:   Reviewed in EPIC      epoetin hira (EPOGEN,PROCRIT) inj ESRD  2,000 Units Intravenous See Admin Instructions     fluticasone  1 spray Both Nostrils Daily     pantoprazole  40 mg Intravenous QAM AC     metoprolol  5 mg Intravenous Q6H     miconazole   Topical BID     amiodarone (PACERONE/CODARONE) tablet 200 mg  200 mg Oral BID     - MEDICATION INSTRUCTIONS for Dialysis Patients -   Does not apply See Admin Instructions     insulin aspart  1-6 Units Subcutaneous Q4H     - MEDICATION INSTRUCTIONS -, sore throat lozenge, LORazepam, ipratropium - albuterol 0.5 mg/2.5 mg/3 mL, nitroGLYcerin, naloxone, HYDROmorphone, bisacodyl, ondansetron **OR** ondansetron, prochlorperazine  **OR** prochlorperazine **OR** prochlorperazine, HOLD MEDICATION, hypromellose-dextran, glucose **OR** dextrose **OR** glucagon     Allergies   Allergen Reactions     Ciprofloxacin Nausea and Vomiting     Zofran did not help     Oxycodone Visual Disturbance     Delusions, blackouts      Lisinopril Cough     Sulfa Drugs GI Disturbance     LOSS OF TASTE              Labs:     BMP  Recent Labs  Lab 10/23/17  0809 10/22/17  0305    136   POTASSIUM 4.1 4.1   CHLORIDE 100 96   MALICK 7.9* 8.2*   CO2 28 30   BUN 30 21   CR 4.96* 3.91*   GLC 66* 110*     CBC  Recent Labs  Lab 10/23/17  0809 10/22/17  0305   WBC 4.7 7.4   HGB 9.4* 10.4*   HCT 30.7* 33.1*   MCV 91 88    357     Lab Results   Component Value Date    AST 14 10/22/2017    ALT 9 10/22/2017    GGT 18 12/02/2004    ALKPHOS 180 (H) 10/22/2017    BILITOTAL 0.3 10/22/2017    BILICONJ 0.0 07/05/2007            Physical Exam:   Vitals were reviewed in [facility]    Wt Readings from Last 3 Encounters:   10/23/17 65.8 kg (145 lb 1 oz)   10/18/17 62.2 kg (137 lb 2 oz)   10/01/17 77.3 kg (170 lb 8 oz)       Intake/Output Summary (Last 24 hours) at 10/23/17 1552  Last data filed at 10/23/17 0705   Gross per 24 hour   Intake              811 ml   Output              300 ml   Net              511 ml       GENERAL APPEARANCE: pleasant, no distress, a & o  HEENT:  Eyes/ears/nose grossly normal, neck supple  RESP: lungs clear to auscultation with good efforts, no crackles, rhonchi or wheezes  CV: regular rate and rhythm, normal S1 S2, soft systole M, no click or rub   ABDOMEN: soft, nontender, bowel sounds normal  EXTREMITIES/SKIN: tr BLE edema, no rashes or lesions     Pt seen on dialysis.  Stable run.  Good BFR.      Attestation:  I have reviewed today's vital signs, notes, medications, labs and imaging.     Micheal Rich MD  [facility] - Nephrology  [phone]

## 2017-10-23 NOTE — PLAN OF CARE
Problem: Patient Care Overview  Goal: Plan of Care/Patient Progress Review  Outcome: No Change  A/O x 4; anxious.  VSS on 2 LPM NC; O2 sats maintaining > 92%.  Tele--see chart.  BG J4V--43, 77(pt. Requested IV fluids if replacement needed).  Abdomin; nontender/soft; BS--LUQ and LLQ--hyperactive, RUQ and RLQ--hypoactive; denies passing gas.  NG--patent, output--.  Assist x 1; walker/GB to bedside commode.  NPO maintained.   Denies nausea, vomiting, pain, or SOB.  Dialysis in AM; abdominal X-ray repeat today.  D/C pending clinical progress.

## 2017-10-23 NOTE — PROGRESS NOTES
Clinic Care Coordination Contact    Situation: Patient chart reviewed by care coordinator.    Background: RN CC was notified pt was hospitalized    Assessment: Pt is currently in [facility] for bowel obstruction with possible discharge soon.      Plan/Recommendations: Will continue to monitor for pt status. Given pt's history of repeated hospitalizations and ED visits, TCU is highly recommended, at least until previous TCU admission goals are met.     Meenakshi REYNAGA, RN, PHN  Care Coordination    [facility]  [address] [place] [address]  Office: [phone]  Fax [phone]   [facility]  [address] [place] [address]  Office: [phone] Fax [phone]  [email or web address]   [email or web address]   Connect with [facility] on social media.

## 2017-10-24 ENCOUNTER — APPOINTMENT (OUTPATIENT)
Dept: GENERAL RADIOLOGY | Facility: CLINIC | Age: 77
DRG: 329 | End: 2017-10-24
Attending: PHYSICIAN ASSISTANT
Payer: MEDICARE

## 2017-10-24 ENCOUNTER — ANESTHESIA EVENT (OUTPATIENT)
Dept: SURGERY | Facility: CLINIC | Age: 77
DRG: 329 | End: 2017-10-24
Payer: MEDICARE

## 2017-10-24 ENCOUNTER — ANESTHESIA (OUTPATIENT)
Dept: SURGERY | Facility: CLINIC | Age: 77
DRG: 329 | End: 2017-10-24
Payer: MEDICARE

## 2017-10-24 LAB
ABO + RH BLD: NORMAL
ABO + RH BLD: NORMAL
ANION GAP SERPL CALCULATED.3IONS-SCNC: 8 MMOL/L (ref 3–14)
BACTERIA SPEC CULT: ABNORMAL
BASOPHILS # BLD AUTO: 0 10E9/L (ref 0–0.2)
BASOPHILS NFR BLD AUTO: 0.8 %
BLD GP AB SCN SERPL QL: NORMAL
BLOOD BANK CMNT PATIENT-IMP: NORMAL
BUN SERPL-MCNC: 9 MG/DL (ref 7–30)
CALCIUM SERPL-MCNC: 7.5 MG/DL (ref 8.5–10.1)
CHLORIDE SERPL-SCNC: 105 MMOL/L (ref 94–109)
CO2 SERPL-SCNC: 29 MMOL/L (ref 20–32)
CREAT SERPL-MCNC: 2.69 MG/DL (ref 0.52–1.04)
DIFFERENTIAL METHOD BLD: ABNORMAL
EOSINOPHIL # BLD AUTO: 0.1 10E9/L (ref 0–0.7)
EOSINOPHIL NFR BLD AUTO: 3.9 %
ERYTHROCYTE [DISTWIDTH] IN BLOOD BY AUTOMATED COUNT: 16 % (ref 10–15)
GFR SERPL CREATININE-BSD FRML MDRD: 17 ML/MIN/1.7M2
GLUCOSE BLDC GLUCOMTR-MCNC: 103 MG/DL (ref 70–99)
GLUCOSE BLDC GLUCOMTR-MCNC: 108 MG/DL (ref 70–99)
GLUCOSE BLDC GLUCOMTR-MCNC: 80 MG/DL (ref 70–99)
GLUCOSE BLDC GLUCOMTR-MCNC: 89 MG/DL (ref 70–99)
GLUCOSE BLDC GLUCOMTR-MCNC: 91 MG/DL (ref 70–99)
GLUCOSE BLDC GLUCOMTR-MCNC: 95 MG/DL (ref 70–99)
GLUCOSE BLDC GLUCOMTR-MCNC: 96 MG/DL (ref 70–99)
GLUCOSE SERPL-MCNC: 86 MG/DL (ref 70–99)
HCT VFR BLD AUTO: 30 % (ref 35–47)
HGB BLD-MCNC: 9.1 G/DL (ref 11.7–15.7)
IMM GRANULOCYTES # BLD: 0 10E9/L (ref 0–0.4)
IMM GRANULOCYTES NFR BLD: 1.1 %
LYMPHOCYTES # BLD AUTO: 0.9 10E9/L (ref 0.8–5.3)
LYMPHOCYTES NFR BLD AUTO: 24.4 %
Lab: ABNORMAL
MCH RBC QN AUTO: 27.7 PG (ref 26.5–33)
MCHC RBC AUTO-ENTMCNC: 30.3 G/DL (ref 31.5–36.5)
MCV RBC AUTO: 91 FL (ref 78–100)
MONOCYTES # BLD AUTO: 0.5 10E9/L (ref 0–1.3)
MONOCYTES NFR BLD AUTO: 15 %
NEUTROPHILS # BLD AUTO: 2 10E9/L (ref 1.6–8.3)
NEUTROPHILS NFR BLD AUTO: 54.8 %
NRBC # BLD AUTO: 0 10*3/UL
NRBC BLD AUTO-RTO: 0 /100
PLATELET # BLD AUTO: 311 10E9/L (ref 150–450)
POTASSIUM SERPL-SCNC: 3.8 MMOL/L (ref 3.4–5.3)
RBC # BLD AUTO: 3.29 10E12/L (ref 3.8–5.2)
SODIUM SERPL-SCNC: 142 MMOL/L (ref 133–144)
SPECIMEN EXP DATE BLD: NORMAL
SPECIMEN SOURCE: ABNORMAL
WBC # BLD AUTO: 3.6 10E9/L (ref 4–11)

## 2017-10-24 PROCEDURE — 25000128 H RX IP 250 OP 636: Performed by: INTERNAL MEDICINE

## 2017-10-24 PROCEDURE — 25000125 ZZHC RX 250: Performed by: INTERNAL MEDICINE

## 2017-10-24 PROCEDURE — 27210995 ZZH RX 272: Performed by: SURGERY

## 2017-10-24 PROCEDURE — 25000132 ZZH RX MED GY IP 250 OP 250 PS 637: Mod: GY | Performed by: INTERNAL MEDICINE

## 2017-10-24 PROCEDURE — 25000132 ZZH RX MED GY IP 250 OP 250 PS 637: Mod: GY | Performed by: SURGERY

## 2017-10-24 PROCEDURE — 25000125 ZZHC RX 250

## 2017-10-24 PROCEDURE — 36415 COLL VENOUS BLD VENIPUNCTURE: CPT | Performed by: INTERNAL MEDICINE

## 2017-10-24 PROCEDURE — 12000007 ZZH R&B INTERMEDIATE

## 2017-10-24 PROCEDURE — 27210995 ZZH RX 272

## 2017-10-24 PROCEDURE — 85025 COMPLETE CBC W/AUTO DIFF WBC: CPT | Performed by: INTERNAL MEDICINE

## 2017-10-24 PROCEDURE — 0DNB0ZZ RELEASE ILEUM, OPEN APPROACH: ICD-10-PCS | Performed by: SURGERY

## 2017-10-24 PROCEDURE — 80048 BASIC METABOLIC PNL TOTAL CA: CPT | Performed by: INTERNAL MEDICINE

## 2017-10-24 PROCEDURE — 25000125 ZZHC RX 250: Performed by: REGISTERED NURSE

## 2017-10-24 PROCEDURE — 44602 SUTURE SMALL INTESTINE: CPT | Performed by: SURGERY

## 2017-10-24 PROCEDURE — 40000170 ZZH STATISTIC PRE-PROCEDURE ASSESSMENT II: Performed by: SURGERY

## 2017-10-24 PROCEDURE — 25000128 H RX IP 250 OP 636: Performed by: SURGERY

## 2017-10-24 PROCEDURE — 74020 XR ABDOMEN 2 VW: CPT

## 2017-10-24 PROCEDURE — 99231 SBSQ HOSP IP/OBS SF/LOW 25: CPT | Performed by: INTERNAL MEDICINE

## 2017-10-24 PROCEDURE — 86850 RBC ANTIBODY SCREEN: CPT | Performed by: ANESTHESIOLOGY

## 2017-10-24 PROCEDURE — 37000008 ZZH ANESTHESIA TECHNICAL FEE, 1ST 30 MIN: Performed by: SURGERY

## 2017-10-24 PROCEDURE — 36000063 ZZH SURGERY LEVEL 4 EA 15 ADDTL MIN: Performed by: SURGERY

## 2017-10-24 PROCEDURE — 71000012 ZZH RECOVERY PHASE 1 LEVEL 1 FIRST HR: Performed by: SURGERY

## 2017-10-24 PROCEDURE — 0DQB0ZZ REPAIR ILEUM, OPEN APPROACH: ICD-10-PCS | Performed by: SURGERY

## 2017-10-24 PROCEDURE — 36000093 ZZH SURGERY LEVEL 4 1ST 30 MIN: Performed by: SURGERY

## 2017-10-24 PROCEDURE — 86901 BLOOD TYPING SEROLOGIC RH(D): CPT | Performed by: ANESTHESIOLOGY

## 2017-10-24 PROCEDURE — 25000125 ZZHC RX 250: Performed by: NURSE ANESTHETIST, CERTIFIED REGISTERED

## 2017-10-24 PROCEDURE — 86900 BLOOD TYPING SEROLOGIC ABO: CPT | Performed by: ANESTHESIOLOGY

## 2017-10-24 PROCEDURE — 27210794 ZZH OR GENERAL SUPPLY STERILE: Performed by: SURGERY

## 2017-10-24 PROCEDURE — 00000146 ZZHCL STATISTIC GLUCOSE BY METER IP

## 2017-10-24 PROCEDURE — S0020 INJECTION, BUPIVICAINE HYDRO: HCPCS | Performed by: SURGERY

## 2017-10-24 PROCEDURE — 25000125 ZZHC RX 250: Performed by: SURGERY

## 2017-10-24 PROCEDURE — 37000009 ZZH ANESTHESIA TECHNICAL FEE, EACH ADDTL 15 MIN: Performed by: SURGERY

## 2017-10-24 PROCEDURE — 25000566 ZZH SEVOFLURANE, EA 15 MIN: Performed by: SURGERY

## 2017-10-24 PROCEDURE — 25000128 H RX IP 250 OP 636: Performed by: ANESTHESIOLOGY

## 2017-10-24 PROCEDURE — A9270 NON-COVERED ITEM OR SERVICE: HCPCS | Mod: GY | Performed by: SURGERY

## 2017-10-24 PROCEDURE — 25000128 H RX IP 250 OP 636: Performed by: NURSE ANESTHETIST, CERTIFIED REGISTERED

## 2017-10-24 PROCEDURE — A9270 NON-COVERED ITEM OR SERVICE: HCPCS | Mod: GY | Performed by: INTERNAL MEDICINE

## 2017-10-24 PROCEDURE — 25000128 H RX IP 250 OP 636: Performed by: REGISTERED NURSE

## 2017-10-24 RX ORDER — LIDOCAINE HYDROCHLORIDE 20 MG/ML
INJECTION, SOLUTION INFILTRATION; PERINEURAL PRN
Status: DISCONTINUED | OUTPATIENT
Start: 2017-10-24 | End: 2017-10-24

## 2017-10-24 RX ORDER — NEOSTIGMINE METHYLSULFATE 1 MG/ML
VIAL (ML) INJECTION PRN
Status: DISCONTINUED | OUTPATIENT
Start: 2017-10-24 | End: 2017-10-24

## 2017-10-24 RX ORDER — ERTAPENEM 1 G/1
INJECTION, POWDER, LYOPHILIZED, FOR SOLUTION INTRAMUSCULAR; INTRAVENOUS PRN
Status: DISCONTINUED | OUTPATIENT
Start: 2017-10-24 | End: 2017-10-24

## 2017-10-24 RX ORDER — FENTANYL CITRATE 50 UG/ML
INJECTION, SOLUTION INTRAMUSCULAR; INTRAVENOUS PRN
Status: DISCONTINUED | OUTPATIENT
Start: 2017-10-24 | End: 2017-10-24

## 2017-10-24 RX ORDER — SODIUM CHLORIDE, SODIUM LACTATE, POTASSIUM CHLORIDE, CALCIUM CHLORIDE 600; 310; 30; 20 MG/100ML; MG/100ML; MG/100ML; MG/100ML
INJECTION, SOLUTION INTRAVENOUS CONTINUOUS
Status: DISCONTINUED | OUTPATIENT
Start: 2017-10-24 | End: 2017-10-24 | Stop reason: HOSPADM

## 2017-10-24 RX ORDER — SODIUM CHLORIDE 9 MG/ML
INJECTION, SOLUTION INTRAVENOUS CONTINUOUS
Status: DISCONTINUED | OUTPATIENT
Start: 2017-10-24 | End: 2017-10-31

## 2017-10-24 RX ORDER — BISACODYL 10 MG
10 SUPPOSITORY, RECTAL RECTAL ONCE
Status: COMPLETED | OUTPATIENT
Start: 2017-10-24 | End: 2017-10-24

## 2017-10-24 RX ORDER — PROPOFOL 10 MG/ML
INJECTION, EMULSION INTRAVENOUS PRN
Status: DISCONTINUED | OUTPATIENT
Start: 2017-10-24 | End: 2017-10-24

## 2017-10-24 RX ORDER — GLYCOPYRROLATE 0.2 MG/ML
INJECTION, SOLUTION INTRAMUSCULAR; INTRAVENOUS PRN
Status: DISCONTINUED | OUTPATIENT
Start: 2017-10-24 | End: 2017-10-24

## 2017-10-24 RX ORDER — ACETAMINOPHEN 10 MG/ML
1000 INJECTION, SOLUTION INTRAVENOUS EVERY 8 HOURS
Status: COMPLETED | OUTPATIENT
Start: 2017-10-24 | End: 2017-10-25

## 2017-10-24 RX ORDER — ALBUTEROL SULFATE 0.83 MG/ML
2.5 SOLUTION RESPIRATORY (INHALATION) EVERY 4 HOURS PRN
Status: DISCONTINUED | OUTPATIENT
Start: 2017-10-24 | End: 2017-10-24 | Stop reason: HOSPADM

## 2017-10-24 RX ORDER — FENTANYL CITRATE 50 UG/ML
25-50 INJECTION, SOLUTION INTRAMUSCULAR; INTRAVENOUS
Status: DISCONTINUED | OUTPATIENT
Start: 2017-10-24 | End: 2017-10-24 | Stop reason: HOSPADM

## 2017-10-24 RX ORDER — BUPIVACAINE HYDROCHLORIDE 5 MG/ML
INJECTION, SOLUTION PERINEURAL PRN
Status: DISCONTINUED | OUTPATIENT
Start: 2017-10-24 | End: 2017-10-24 | Stop reason: HOSPADM

## 2017-10-24 RX ORDER — HYDROMORPHONE HYDROCHLORIDE 1 MG/ML
.3-.5 INJECTION, SOLUTION INTRAMUSCULAR; INTRAVENOUS; SUBCUTANEOUS EVERY 5 MIN PRN
Status: DISCONTINUED | OUTPATIENT
Start: 2017-10-24 | End: 2017-10-24 | Stop reason: HOSPADM

## 2017-10-24 RX ORDER — HYDRALAZINE HYDROCHLORIDE 20 MG/ML
2.5-5 INJECTION INTRAMUSCULAR; INTRAVENOUS EVERY 10 MIN PRN
Status: DISCONTINUED | OUTPATIENT
Start: 2017-10-24 | End: 2017-10-24 | Stop reason: HOSPADM

## 2017-10-24 RX ORDER — MAGNESIUM HYDROXIDE 1200 MG/15ML
LIQUID ORAL PRN
Status: DISCONTINUED | OUTPATIENT
Start: 2017-10-24 | End: 2017-10-24 | Stop reason: HOSPADM

## 2017-10-24 RX ORDER — ONDANSETRON 2 MG/ML
INJECTION INTRAMUSCULAR; INTRAVENOUS PRN
Status: DISCONTINUED | OUTPATIENT
Start: 2017-10-24 | End: 2017-10-24

## 2017-10-24 RX ORDER — EPHEDRINE SULFATE 50 MG/ML
INJECTION, SOLUTION INTRAMUSCULAR; INTRAVENOUS; SUBCUTANEOUS PRN
Status: DISCONTINUED | OUTPATIENT
Start: 2017-10-24 | End: 2017-10-24

## 2017-10-24 RX ORDER — HYDROMORPHONE HYDROCHLORIDE 1 MG/ML
0.2 INJECTION, SOLUTION INTRAMUSCULAR; INTRAVENOUS; SUBCUTANEOUS
Status: DISCONTINUED | OUTPATIENT
Start: 2017-10-24 | End: 2017-11-01 | Stop reason: HOSPADM

## 2017-10-24 RX ORDER — CEFAZOLIN SODIUM 1 G/3ML
INJECTION, POWDER, FOR SOLUTION INTRAMUSCULAR; INTRAVENOUS PRN
Status: DISCONTINUED | OUTPATIENT
Start: 2017-10-24 | End: 2017-10-24

## 2017-10-24 RX ADMIN — CEFAZOLIN 2 G: 1 INJECTION, POWDER, FOR SOLUTION INTRAMUSCULAR; INTRAVENOUS at 17:00

## 2017-10-24 RX ADMIN — ROCURONIUM BROMIDE 20 MG: 10 INJECTION INTRAVENOUS at 17:36

## 2017-10-24 RX ADMIN — PANTOPRAZOLE SODIUM 40 MG: 40 INJECTION, POWDER, FOR SOLUTION INTRAVENOUS at 06:48

## 2017-10-24 RX ADMIN — Medication 5 MG: at 16:51

## 2017-10-24 RX ADMIN — SUCCINYLCHOLINE CHLORIDE 100 MG: 20 INJECTION, SOLUTION INTRAMUSCULAR; INTRAVENOUS at 16:51

## 2017-10-24 RX ADMIN — METOPROLOL TARTRATE 5 MG: 5 INJECTION INTRAVENOUS at 01:33

## 2017-10-24 RX ADMIN — ERTAPENEM SODIUM 1 G: 1 INJECTION, POWDER, LYOPHILIZED, FOR SOLUTION INTRAMUSCULAR; INTRAVENOUS at 18:25

## 2017-10-24 RX ADMIN — MICONAZOLE NITRATE: 2 POWDER TOPICAL at 09:00

## 2017-10-24 RX ADMIN — FENTANYL CITRATE 50 MCG: 50 INJECTION, SOLUTION INTRAMUSCULAR; INTRAVENOUS at 17:36

## 2017-10-24 RX ADMIN — SODIUM CHLORIDE, PRESERVATIVE FREE: 5 INJECTION INTRAVENOUS at 09:00

## 2017-10-24 RX ADMIN — MICONAZOLE NITRATE: 2 POWDER TOPICAL at 22:05

## 2017-10-24 RX ADMIN — FENTANYL CITRATE 25 MCG: 50 INJECTION, SOLUTION INTRAMUSCULAR; INTRAVENOUS at 17:28

## 2017-10-24 RX ADMIN — ROCURONIUM BROMIDE 30 MG: 10 INJECTION INTRAVENOUS at 17:03

## 2017-10-24 RX ADMIN — FENTANYL CITRATE 50 MCG: 50 INJECTION, SOLUTION INTRAMUSCULAR; INTRAVENOUS at 18:07

## 2017-10-24 RX ADMIN — Medication 5 MG: at 17:45

## 2017-10-24 RX ADMIN — METOPROLOL TARTRATE 5 MG: 5 INJECTION INTRAVENOUS at 06:48

## 2017-10-24 RX ADMIN — ONDANSETRON 4 MG: 2 INJECTION INTRAMUSCULAR; INTRAVENOUS at 18:00

## 2017-10-24 RX ADMIN — GLYCOPYRROLATE 0.3 MG: 0.2 INJECTION, SOLUTION INTRAMUSCULAR; INTRAVENOUS at 18:32

## 2017-10-24 RX ADMIN — NEOSTIGMINE METHYLSULFATE 3.5 MG: 1 INJECTION INTRAMUSCULAR; INTRAVENOUS; SUBCUTANEOUS at 18:32

## 2017-10-24 RX ADMIN — PROPOFOL 100 MG: 10 INJECTION, EMULSION INTRAVENOUS at 16:51

## 2017-10-24 RX ADMIN — ACETAMINOPHEN 1000 MG: 10 INJECTION, SOLUTION INTRAVENOUS at 21:45

## 2017-10-24 RX ADMIN — LIDOCAINE HYDROCHLORIDE 60 MG: 20 INJECTION, SOLUTION INFILTRATION; PERINEURAL at 16:51

## 2017-10-24 RX ADMIN — BISACODYL 10 MG: 10 SUPPOSITORY RECTAL at 12:13

## 2017-10-24 RX ADMIN — FLUTICASONE PROPIONATE 1 SPRAY: 50 SPRAY, METERED NASAL at 09:01

## 2017-10-24 RX ADMIN — METOPROLOL TARTRATE 5 MG: 5 INJECTION INTRAVENOUS at 12:13

## 2017-10-24 RX ADMIN — SODIUM CHLORIDE: 9 INJECTION, SOLUTION INTRAVENOUS at 13:52

## 2017-10-24 RX ADMIN — FENTANYL CITRATE 25 MCG: 50 INJECTION, SOLUTION INTRAMUSCULAR; INTRAVENOUS at 17:30

## 2017-10-24 RX ADMIN — LORAZEPAM 0.25 MG: 2 INJECTION INTRAMUSCULAR; INTRAVENOUS at 12:55

## 2017-10-24 RX ADMIN — Medication 5 MG: at 17:00

## 2017-10-24 RX ADMIN — AMIODARONE HYDROCHLORIDE 200 MG: 200 TABLET ORAL at 22:00

## 2017-10-24 RX ADMIN — FENTANYL CITRATE 50 MCG: 50 INJECTION, SOLUTION INTRAMUSCULAR; INTRAVENOUS at 16:51

## 2017-10-24 RX ADMIN — AMIODARONE HYDROCHLORIDE 200 MG: 200 TABLET ORAL at 09:00

## 2017-10-24 ASSESSMENT — PAIN DESCRIPTION - DESCRIPTORS: DESCRIPTORS: CRAMPING

## 2017-10-24 ASSESSMENT — ENCOUNTER SYMPTOMS
SEIZURES: 0
DYSRHYTHMIAS: 1

## 2017-10-24 ASSESSMENT — COPD QUESTIONNAIRES: COPD: 0

## 2017-10-24 ASSESSMENT — LIFESTYLE VARIABLES: TOBACCO_USE: 0

## 2017-10-24 NOTE — PLAN OF CARE
Problem: Patient Care Overview  Goal: Plan of Care/Patient Progress Review  Outcome: No Change  A+Ox4 up with 1 assist GB+walker to bedside commode. On low intermittent suction. NPO with sips of water/popsicle. BG 80, gave fruit ice cup. Denies nausea. IV metoprolol. NS @60. VSS, on RA, denies pain, will continue to monitor. XR this AM, surgery this afternoon. Pt voiced anxiety about surgery, gave ativan for and  services was contacted per pt request.  Left for OR @ 4186.

## 2017-10-24 NOTE — PLAN OF CARE
Problem: Patient Care Overview  Goal: Plan of Care/Patient Progress Review  PT: Orders received, chart reviewed, discussed with patient. Pt states she is having surgery at 2 pm. Hold PT eval until this is completed.

## 2017-10-24 NOTE — TELEPHONE ENCOUNTER
Still admitted.    Call date: 10/27    Yesy Samaniego, PharmD, [facility]  [phone]  October 24, 2017

## 2017-10-24 NOTE — ANESTHESIA CARE TRANSFER NOTE
Patient: Kathryn Banks    Procedure(s):  REPAIR FOCAL ILEAL SMALL BOWEL PERFERATION, LYSIS OF ADHESIONS. - Wound Class: II-Clean Contaminated    Diagnosis: BOWEL OBSTRUCTION  Diagnosis Additional Information: No value filed.    Anesthesia Type:   General, ETT     Note:  Airway :Face Mask  Patient transferred to:PACU  Comments: Transferred to PACU, spontaneous respirations, 10L oxygen via facemask.  All monitors and alarms on and functioning, VSS.  Patient awake, comfortable.  Report to PACU RN.Handoff Report: Identifed the Patient, Identified the Reponsible Provider, Reviewed the pertinent medical history, Discussed the surgical course, Reviewed Intra-OP anesthesia mangement and issues during anesthesia, Set expectations for post-procedure period and Allowed opportunity for questions and acknowledgement of understanding      Vitals: (Last set prior to Anesthesia Care Transfer)    CRNA VITALS  10/24/2017 1811 - 10/24/2017 1849      10/24/2017             NIBP: (!)  178/130    Pulse: 107    NIBP Mean: 143    Ht Rate: 110    SpO2: 98 %    Resp Rate (set): 10                Electronically Signed By: RHODA Cadena CRNA  October 24, 2017  6:49 PM

## 2017-10-24 NOTE — ANESTHESIA PREPROCEDURE EVALUATION
Procedure: Procedure(s):  COLECTOMY WITHOUT COLOSTOMY  Preop diagnosis: BOWEL OBSTRUCTION    Allergies   Allergen Reactions     Ciprofloxacin Nausea and Vomiting     Zofran did not help     Oxycodone Visual Disturbance     Delusions, blackouts      Lisinopril Cough     Sulfa Drugs GI Disturbance     LOSS OF TASTE     Penicillins Other (See Comments)     Swelling and reddness at injection site, pt wanted it on      Past Medical History:   Diagnosis Date     Carpal tunnel syndrome      Coronary atherosclerosis of native coronary artery 2006    5 vessel CABG     OBSTRUCTIVE SLEEP APNEA     using CPAP     Osteoarthrosis, unspecified whether generalized or localized, unspecified site     generalized arthritis, particularly in her hands and feet         Other and combined forms of senile cataract 2000    Bilateral     Other and unspecified hyperlipidemia      RESTLESS LEGS SYNDROME      TENOSYNOV HAND/WRIST NEC 6/30/2006     Type II or unspecified type diabetes mellitus without mention of complication, not stated as uncontrolled 2001    Diabetes mellitus/pt is diet controlled with weight loss     Typical atrial flutter (H) 01/17/2007    ablation 6/7/2017     Unspecified essential hypertension      Past Surgical History:   Procedure Laterality Date     ANGIOPLASTY       C APPENDECTOMY       C CABG, VEIN, FIVE  12/06    SVG x 4 and LIMA to LAD     C SOTO W/O FACETEC FORAMOT/[facility]KC 1/2 VRT SEG, LUMBAR  1968     C REMV CATARACT INTRACAP,INSERT LENS      Bilateral     C TOTAL ABDOM HYSTERECTOMY  1995     - fibroids     C VAGOTOMY/PYLOROPLASTY,SELECT  1970     COLONOSCOPY  12/3/2007     COLONOSCOPY  1/17/2014    Procedure: COLONOSCOPY;  Colonoscopy;  Surgeon: Zack Stearns MD;  Location:  GI     CYSTOSCOPY  11/25/09    [facility]     ENDOSCOPY  03/21/2000    Upper GI     HC COLONOSCOPY THRU STOMA, DIAGNOSTIC  10/7/04    poor prep, repeat in 2-3 years     HC COLONOSCOPY W/WO BRUSH/WASH  10/31/07    Repeat-poor quality prep      HC REMOVAL OF OVARY/TUBE(S)      Salpingo-Oophorectomy, Unilateral     HC REVISE MEDIAN N/CARPAL TUNNEL SURG      Carpal tunnel release     HC UGI ENDOSCOPY DIAG W BIOPSY  10/31/07     HC UGI ENDOSCOPY, SIMPLE EXAM  12/3/2007     OPEN REDUCTION INTERNAL FIXATION HIP NAILING Left 7/3/2016    Procedure: OPEN REDUCTION INTERNAL FIXATION HIP NAILING;  Surgeon: Lake Schulz MD;  Location: [facility] OR     Prior to Admission medications    Medication Sig Start Date End Date Taking? Authorizing Provider   alum & mag hydroxide-simethicone (MYLANTA ES/MAALOX  ES) 400-400-40 MG/5ML SUSP suspension Take 10 mLs by mouth every 6 hours as needed for indigestion Take for 3 days starting on 10/21/17.   Yes Unknown, Entered By History   WARFARIN SODIUM PO Take 3.5 mg by mouth daily   Yes Unknown, Entered By History   sodium polystyrene (KAYEXALATE) 15 GM/60ML suspension Take 30 g by mouth Take as a single dose directed for emergency use only as directed by nephrologist.   Yes Unknown, Entered By History   ondansetron (ZOFRAN ODT) 4 MG ODT tab Take 4 mg by mouth every 6 hours as needed for nausea   Yes Unknown, Entered By History   AMIODARONE HCL PO Take 200 mg by mouth 2 times daily    Yes Reported, Patient   nystatin (MYCOSTATIN) 422127 UNIT/GM POWD Apply topically 2 times daily as needed    Yes Reported, Patient   cholecalciferol 5000 UNITS CAPS Take 1 capsule (5,000 Units) by mouth daily 10/18/17 11/17/17 Yes Hank Babcock MD   clonazePAM (KLONOPIN) 0.5 MG tablet Take 0.5 tablets (0.25 mg) by mouth 2 times daily as needed for anxiety 10/18/17 10/25/17 Yes Hank Babcock MD   darbepoetin hira (ARANESP, ALBUMIN FREE,) 60 MCG/0.3ML injection Inject 0.3 mLs (60 mcg) Subcutaneous every 14 days As needed for hgb<10g/dL.  If Hgb increases >1 point in 2 weeks (if blood transfusion given, use hgb PRIOR to this), SYSTOLIC BP > 180 mmHg or hgb>=10g/dL, HOLD DOSE. Dose must be within 1 week of Hgb.  Per anemia  protocol with Vibha Barfield MD/Yesy SamaniegoPharmD [phone] 9/20/17  Yes Vibha Barfield MD   pramipexole (MIRAPEX) 0.125 MG tablet Take 1 tablet (0.125 mg) by mouth 3 times daily 9/15/17  Yes Mike Irene MD   fluticasone (FLONASE) 50 MCG/ACT spray Spray 1 spray into both nostrils daily 9/1/17  Yes Mike Tadeo MD   Calcium Carb-Cholecalciferol 500-400 MG-UNIT TABS Take 1 tablet by mouth 2 times daily   Yes Unknown, Entered By History   ipratropium - albuterol 0.5 mg/2.5 mg/3 mL (DUONEB) 0.5-2.5 (3) MG/3ML neb solution Take 1 vial by nebulization 3 times daily   Yes Unknown, Entered By History   ACETAMINOPHEN PO Take 650 mg by mouth every 4 hours as needed for pain For pain 1-5 out of 10   Yes Reported, Patient   nitroGLYcerin (NITROSTAT) 0.4 MG sublingual tablet Place 1 tablet (0.4 mg) under the tongue every 5 minutes as needed for chest pain 8/18/17  Yes Yareli Lorenzo MD   pravastatin (PRAVACHOL) 40 MG tablet Take 1 tablet (40 mg) by mouth daily 8/18/17  Yes Yareli Lorenzo MD   calcium acetate (PHOSLO) 667 MG CAPS capsule Take 1 capsule (667 mg) by mouth 3 times daily (with meals) 8/18/17  Yes Yareli Lorenzo MD   pentoxifylline (TRENTAL) 400 MG CR tablet Take 1 tablet (400 mg) by mouth daily 8/18/17  Yes Yareli Lorenzo MD   Lactobacillus (ACIDOPHILUS) tablet Take 1 tablet by mouth daily 8/18/17  Yes Lake Pierre MD   ORDER FOR DME Equipment being ordered: cpap machine, mask, humidifier, tubing and filters 3/4/14   Dheeraj Jj MD     Current Facility-Administered Medications Ordered in Epic   Medication Dose Route Frequency Last Rate Last Dose     lidocaine 1 % 1 mL  1 mL Other Q1H PRN         0.9% sodium chloride infusion   Intravenous Continuous         [Auto Hold] benzocaine-menthol (CHLORASEPTIC) 6-10 MG lozenge 1-2 lozenge  1-2 lozenge Buccal Q1H PRN   2 lozenge at 10/23/17 1355     [Auto Hold] LORazepam (ATIVAN) injection 0.25 mg  0.25 mg  Intravenous Daily PRN   0.25 mg at 10/24/17 1255     [Auto Hold] ipratropium - albuterol 0.5 mg/2.5 mg/3 mL (DUONEB) neb solution 3 mL  1 vial Nebulization Q4H PRN         [Auto Hold] fluticasone (FLONASE) 50 MCG/ACT spray 1 spray  1 spray Both Nostrils Daily   1 spray at 10/24/17 0901     [Auto Hold] nitroGLYcerin (NITROSTAT) sublingual tablet 0.4 mg  0.4 mg Sublingual Q5 Min PRN         [Auto Hold] naloxone (NARCAN) injection 0.1-0.4 mg  0.1-0.4 mg Intravenous Q2 Min PRN         0.9% sodium chloride infusion   Intravenous Continuous 60 mL/hr at 10/24/17 0900       [Auto Hold] HYDROmorphone (PF) (DILAUDID) injection 0.3-0.5 mg  0.3-0.5 mg Intravenous Q2H PRN   0.5 mg at 10/23/17 1913     [Auto Hold] bisacodyl (DULCOLAX) Suppository 10 mg  10 mg Rectal Daily PRN         [Auto Hold] ondansetron (ZOFRAN-ODT) ODT tab 4 mg  4 mg Oral Q6H PRN        Or     [Auto Hold] ondansetron (ZOFRAN) injection 4 mg  4 mg Intravenous Q6H PRN         [Auto Hold] prochlorperazine (COMPAZINE) injection 5 mg  5 mg Intravenous Q6H PRN        Or     [Auto Hold] prochlorperazine (COMPAZINE) tablet 5 mg  5 mg Oral Q6H PRN        Or     [Auto Hold] prochlorperazine (COMPAZINE) Suppository 12.5 mg  12.5 mg Rectal Q12H PRN         [Auto Hold] pantoprazole (PROTONIX) 40 mg IV push injection  40 mg Intravenous QAM AC   40 mg at 10/24/17 0648     [Auto Hold] metoprolol (LOPRESSOR) injection 5 mg  5 mg Intravenous Q6H   5 mg at 10/24/17 1213     [Auto Hold] miconazole (MICATIN; MICRO GUARD) 2 % powder   Topical BID         HOLD: warfarin (COUMADIN) therapy   Does not apply HOLD         [Auto Hold] hypromellose-dextran (ARTIFICAL TEARS) ophthalmic solution 1 drop  1 drop Both Eyes 4x Daily PRN   1 drop at 10/22/17 2106     [Auto Hold] amiodarone (PACERONE/CODARONE) tablet 200 mg  200 mg Oral BID   200 mg at 10/24/17 0900     [Auto Hold] - MEDICATION INSTRUCTIONS for Dialysis Patients -   Does not apply See Admin Instructions         [Auto Hold]  glucose 40 % gel 15-30 g  15-30 g Oral Q15 Min PRN   15 g at 10/23/17 0838    Or     [Auto Hold] dextrose 50 % injection 25-50 mL  25-50 mL Intravenous Q15 Min PRN        Or     [Auto Hold] glucagon injection 1 mg  1 mg Subcutaneous Q15 Min PRN         [Auto Hold] insulin aspart (NovoLOG) inj (RAPID ACTING)  1-6 Units Subcutaneous Q4H   2 Units at 10/23/17 1356     No current [facility]-ordered outpatient prescriptions on file.     Wt Readings from Last 1 Encounters:   10/23/17 65.8 kg (145 lb 1 oz)     Temp Readings from Last 1 Encounters:   10/24/17 36.9  C (98.4  F) (Oral)     BP Readings from Last 6 Encounters:   10/24/17 133/68   10/20/17 117/64   10/18/17 112/66   10/02/17 (!) 73/49   10/01/17 90/48   09/22/17 147/77     Pulse Readings from Last 4 Encounters:   10/23/17 78   10/20/17 65   10/18/17 70   10/01/17 50     Resp Readings from Last 1 Encounters:   10/24/17 18     SpO2 Readings from Last 1 Encounters:   10/24/17 94%     Recent Labs   Lab Test  10/24/17   0739  10/23/17   0809   NA  142  138   POTASSIUM  3.8  4.1   CHLORIDE  105  100   CO2  29  28   ANIONGAP  8  10   GLC  86  66*   BUN  9  30   CR  2.69*  4.96*   MALICK  7.5*  7.9*     Recent Labs   Lab Test  10/24/17   0739  10/23/17   0809   WBC  3.6*  4.7   HGB  9.1*  9.4*   PLT  311  324     Recent Labs   Lab Test  10/23/17   0809  10/22/17   2115   INR  1.16*  1.45*     ECG: Atrial fibrillation with rapid ventricular response  Abnormal ECG  When compared with ECG of 12-OCT-2017 17:14,  No significant change was found    ECHO: Interpretation Summary  The visual ejection fraction is estimated at 60-65%.  Normal left ventricular wall motion  Left ventricular diastolic function is indeterminate.  The right ventricle is mild to moderately dilated.  The right ventricular systolic function is mild to moderately reduced.  The left atrium is moderately dilated.  The right ventricular systolic pressure is approximated at 42mmHg plus the  right atrial  pressure.  Normal IVC (1.5-2.5cm) with <50% respiratory collapse; right atrial pressure  is estimated at 10-15mmHg.  Moderate (46-55mmHg) pulmonary hypertension is present.  Moderate left pleural effusion  Sinus rhythm was noted.  Compared to prior study, there is no significant change.    Left Ventricle  The left ventricle is normal in size. There is normal left ventricular wall  thickness. The visual ejection fraction is estimated at 60-65%. Left  ventricular diastolic function is indeterminate. e' not recorded. Normal left  ventricular wall motion.     Right Ventricle  The right ventricle is mild to moderately dilated. The right ventricular  systolic function is mild to moderately reduced.     Atria  The left atrium is moderately dilated. Right atrial size is normal. There is  no color Doppler evidence of an atrial shunt.     Mitral Valve  There is moderate to severe mitral annular calcification. The mitral papillary  muscle appears thickened and/or calcified. There is trace to mild mitral  regurgitation. There is no mitral valve stenosis.        Tricuspid Valve  The tricuspid valve is normal in structure and function. There is mild to  moderate (1-2+) tricuspid regurgitation. The right ventricular systolic  pressure is approximated at 42mmHg plus the right atrial pressure. Normal IVC  (1.5-2.5cm) with <50% respiratory collapse; right atrial pressure is estimated  at 10-15mmHg. Moderate (46-55mmHg) pulmonary hypertension is present.     Aortic Valve  The aortic valve is trileaflet with aortic valve sclerosis. There is severe  aortic sclerosis of the non-coronary cusp. There is moderate aortic sclerosis  of the right coronary cusp. There is trace aortic regurgitation. No aortic  stenosis is present.     Pulmonic Valve  The pulmonic valve is not well seen, but is grossly normal. There is trace  pulmonic valvular regurgitation.     Vessels  The aortic root is normal size. Normal size ascending aorta. The IVC  is  dilated and fails to change with respiration, suggesting elevated central  venous pressure.     Pericardium  There is no pericardial effusion. Moderate left pleural effusion.        Rhythm  Sinus rhythm was noted.      Anesthesia Evaluation     .             ROS/MED HX    ENT/Pulmonary:     (+)sleep apnea, , recent URI resolved . .   (-) tobacco use, asthma and COPD   Neurologic:     (+)other neuro RLS   (-) seizures, CVA and migraines   Cardiovascular:     (+) Dyslipidemia, hypertension--CAD, -CABG-date: 2006, . : . CHF . . :. dysrhythmias a-fib, . pulmonary hypertension,      (-) valvular problems/murmurs   METS/Exercise Tolerance:  1 - Eating, dressing   Hematologic:     (+) Anemia, -     (-) history of blood clots and other hematologic disorder   Musculoskeletal:   (+) arthritis, , , -       GI/Hepatic:     (+) GERD      (-) liver disease   Renal/Genitourinary:     (+) chronic renal disease, type: ARF, Pt requires dialysis,    (-) nephrolithiasis   Endo:     (+) type II DM Obesity, .   (-) Type I DM and thyroid disease   Psychiatric:     (+) psychiatric history anxiety      Infectious Disease:        (-) Recent Fever   Malignancy:         Other:                     Physical Exam  Normal systems: cardiovascular, pulmonary and dental    Airway   Mallampati: II  TM distance: >3 FB  Neck ROM: full    Dental     Cardiovascular   Rhythm and rate: regular and normal      Pulmonary                     Anesthesia Plan      History & Physical Review      ASA Status:  3 .    NPO Status:  > 8 hours    Plan for General and ETT with Propofol induction. Maintenance will be Balanced.    PONV prophylaxis:  Ondansetron (or other 5HT-3) and Dexamethasone or Solumedrol  Additional equipment: Videolaryngoscope      Postoperative Care  Postoperative pain management:  Multi-modal analgesia.      Consents  Anesthetic plan, risks, benefits and alternatives discussed with:  Patient..                          .

## 2017-10-24 NOTE — PLAN OF CARE
Problem: Patient Care Overview  Goal: Plan of Care/Patient Progress Review  Outcome: No Change  A/O x 4; anxious.  VSS on 1 LPM NC; O2 sats maintaining > 92%.  Tele--see chart.  BG G9U--17, 91.  Abdomin; nontender/soft; BS--LUQ and LLQ--hyperactive, RLQ--audible/normoactive, RUQ--hypoactive; denies passing gas.  NG--patent, placement checked with air; low intermittent suction.  Assist x 1; walker/GB to bedside commode.  NPO maintained.  Tunneled cath R Upper chest; dressing C/D/I.  Denies nausea, vomiting, pain, or SOB.  Abdominal X-ray repeat today.  Plan--see if SBO has improved if not OR.

## 2017-10-24 NOTE — BRIEF OP NOTE
[facility] Brief Operative Note    Pre-operative diagnosis: BOWEL OBSTRUCTION   Post-operative diagnosis * No post-op diagnosis entered *     Procedure: Procedure(s):  REPAIR FOCAL ILEAL SMALL BOWEL PERFERATION, LYSIS OF ADHESIONS. - Wound Class: II-Clean Contaminated   Surgeon(s): Surgeon(s) and Role:     * Rashard Zafar MD - Primary     * Odin Santos MD - Resident - Assisting   Estimated blood loss: 10 mL    Specimens: * No specimens in log *   Findings: Single small bowel adhesive band s/p lysis and repair of focal perforation.

## 2017-10-24 NOTE — PROGRESS NOTES
[facility]    Internal Medicine Hospitalist Progress Note  10/24/2017  I evaluated patient on the above date.    Juanjose Frost Jr., MD  [phone] (p)  Text Page (7 am to 6 pm)      Assessment & Plan   Ms. Kathryn Banks is a 77-year-old female with history including CAD, HTN, diet controlled DM, afib/flutter, ESRD (started HD last hospitalization), ANABEL, RLS, C. Diff, and recent hospitalizations, most recently at [facility] (10/2-10/18) for issues including pneumonia with septic shock and acute respiratory failure. She presented from TCU 10/22 with abdominal pain and distension with signs of SBO on imaging.    SBO.  CT 10/22 showed closed-loop SBO. NG placed 10/22. Seen by Surgery and NG clamped 10/23 and seemed to tolerate, but more bloated and no flatus or BM thereafter.   - Plan surgery today 10/24.  - Appreciate help from Dr. Zafar.    Recent hospitalization with pneumonia with acute respiratory failure.  Finished antibiotics PTA.  - Monitor clinically.  - Continue PRN Duonebs.  - Wean O2 as able.    Atrial fibrillation/flutter.  H/o atrial flutter ablation 6/2017. Also h/o cardioversion. Issues with afib with RVR last hospitalization (10/2-10/18); started on amiodarone by Cardiology last stay.  - Continue amiodarone as able post surgery.  - Continue metoprolol 5 mg IV q6h.  - Holding PTA warfarin for now.    ESRD on HD.  H/o CKD and was already planning for HD before last hospitalization. Last hospitalization was initiated on HD.  - Continue HD qMWF per Nephrology.     CAD.  Prior CABG 2007. Angiogram 4/2010 showed patent grafts with the exception of VG to RCA which was 100% occluded.  - Resume pravastatin as able.    H/o benign essential HTN.  Diastolic CHF.  Last echo 10/3/2017 showed LVEF 60-65%. Prior to last admission was on amlodipine, metoprolol and torsemide. Issues with septic shock last stay as well as ARF on CKD and starting dialysis. Not on any BP meds or diuretics prior to this admission  10/22.   - Monitor BP's.  - Monitor i/o's, daily wts.    DM2, diet controlled.  Hgb A1C 6.6 10/13/2017.  - NPO now.  - Continue ISS.    ANABEL.  - Continue CPAP.    RLS.  - Resume PTA pramipexole as able.    Anemia, suspect chronic component.  No overt clinical signs of bleeding.   Recent Labs  Lab 10/24/17  0739 10/23/17  0809 10/22/17  0305   HGB 9.1* 9.4* 10.4*   - Continue PTA Aranesp per Nephrology.  - Monitor CBC.  - Consider prbc transfusion if hgb <8.0 or if significant bleeding with hemodynamic instability or if symptomatic.    ?PAD.  LACI studies 8/2017 showed abnormal LACI 0.64 L first digit; otherwise LACI normal bilaterally. PTA on Trental, though pt denies any claudication type symptoms.  - Resume Trental as able.    Deconditioning.  - Continue PT and OT.    Prophylaxis.  - PCD's, ambulation.  - IV Protonix.    CODE STATUS: FULL.    Dispo.  - Pending above.    Interval History   More bloated; no flatus or BM.  Seen in preop.  Denies chest pain or SOB.    -Data reviewed today: I reviewed all new labs and imaging over the last 24 hours. I personally reviewed no images or EKG's today.    Physical Exam   Heart Rate: 75, Blood pressure 133/68, pulse 78, temperature 98.4  F (36.9  C), temperature source Oral, resp. rate 18, weight 65.8 kg (145 lb 1 oz), SpO2 94 %, not currently breastfeeding.  Vitals:    10/22/17 0247 10/23/17 1330   Weight: 65.8 kg (145 lb) 65.8 kg (145 lb 1 oz)     Vital Signs with Ranges  Temp:  [98.3  F (36.8  C)-98.6  F (37  C)] 98.4  F (36.9  C)  Pulse:  [77-82] 78  Heart Rate:  [75-96] 75  Resp:  [16-18] 18  BP: (110-139)/(59-78) 133/68  SpO2:  [94 %-97 %] 94 %  Patient Vitals for the past 24 hrs:   BP Temp Temp src Pulse Heart Rate Resp SpO2   10/24/17 1215 133/68 - - - 75 - -   10/24/17 0729 134/78 98.4  F (36.9  C) Oral - 79 18 94 %   10/24/17 0500 139/74 - - - 81 - -   10/24/17 0122 122/69 98.3  F (36.8  C) Oral - 86 18 96 %   10/23/17 2035 121/72 - - - 85 - -   10/23/17 1940 - - - - -  16 97 %   10/23/17 1913 - - - - - 16 97 %   10/23/17 1837 110/66 - - - 96 - -   10/23/17 1700 134/71 98.6  F (37  C) Oral - 79 16 -   10/23/17 1645 128/68 - - - 78 - -   10/23/17 1630 126/64 - - - 80 - -   10/23/17 1615 127/66 - - - 79 - -   10/23/17 1600 125/65 - - - 78 - -   10/23/17 1545 122/61 - - - 76 - -   10/23/17 1530 122/63 - - - 81 - -   10/23/17 1515 121/61 - - 78 - - -   10/23/17 1500 119/66 - - 79 - - -   10/23/17 1445 124/68 - - 82 - - -   10/23/17 1430 130/59 - - 80 - - -   10/23/17 1415 123/63 - - 79 - - -   10/23/17 1345 127/69 - - 77 - - -     I/O's Last 24 hours  I/O last 3 completed shifts:  In: 1664 [P.O.:500; I.V.:1164]  Out: 700 [Urine:100; Emesis/NG output:100; Other:500]    Constitutional: Alert, oriented.  Respiratory:   Cardiovascular:   GI:   Skin/Integumen:   Other:        Data     Recent Labs  Lab 10/24/17  0739 10/23/17  0809 10/22/17  2115 10/22/17  0305   WBC 3.6* 4.7  --  7.4   HGB 9.1* 9.4*  --  10.4*   MCV 91 91  --  88    324  --  357   INR  --  1.16* 1.45* 3.19*    138  --  136   POTASSIUM 3.8 4.1  --  4.1   CHLORIDE 105 100  --  96   CO2 29 28  --  30   BUN 9 30  --  21   CR 2.69* 4.96*  --  3.91*   ANIONGAP 8 10  --  10   MALICK 7.5* 7.9*  --  8.2*   GLC 86 66*  --  110*   ALBUMIN  --   --   --  2.5*   PROTTOTAL  --   --   --  6.2*   BILITOTAL  --   --   --  0.3   ALKPHOS  --   --   --  180*   ALT  --   --   --  9   AST  --   --   --  14   LIPASE  --   --   --  124     Recent Labs   Lab Test  10/24/17   1235  10/24/17   0841  10/24/17   0739  10/24/17   0435  10/24/17   0109  10/23/17   2140   10/23/17   0809   10/22/17   0305   10/16/17   0545   10/15/17   0700   GLC   --    --   86   --    --    --    --   66*   --   110*   --   102*   --   94   BGM  103*  80   --   91  96  107*   < >   --    < >   --    < >   --    < >   --     < > = values in this interval not displayed.         No results found for this or any previous visit (from the past 24  hour(s)).    Medications   All medications were reviewed.    NaCl       NaCl 60 mL/hr at 10/24/17 0900       [Auto Hold] fluticasone  1 spray Both Nostrils Daily     [Auto Hold] pantoprazole  40 mg Intravenous QAM AC     [Auto Hold] metoprolol  5 mg Intravenous Q6H     [Auto Hold] miconazole   Topical BID     [Auto Hold] amiodarone (PACERONE/CODARONE) tablet 200 mg  200 mg Oral BID     [Auto Hold] - MEDICATION INSTRUCTIONS for Dialysis Patients -   Does not apply See Admin Instructions     [Auto Hold] insulin aspart  1-6 Units Subcutaneous Q4H

## 2017-10-24 NOTE — PROGRESS NOTES
Afebrile, pulse OK. No flatus or BM, still feels bloated, walking in [place]  Exam: abd distended, not tender, feels full  Labs: lytes OK. Hgb 9.1, WBC 3.6  Abd film yesterday showed lots of dilated SB. Will repeat film now.   Imp: relentless bowel obstruction, may well need exploration.  Plan; hold off on operation for one more day. Discussed with pt. Try suppository  Later: film shows little or no improvement. Will hold time for exploration Wednesday, Hope she opens up by then.

## 2017-10-24 NOTE — PLAN OF CARE
Problem: Patient Care Overview  Goal: Plan of Care/Patient Progress Review  Outcome: No Change  A&O x4. VSS on 1-2L O2 NC.  Tele NSR.Up w/1, walker, GB. LS diminished, denies SOB. BS+ in all quadrants, denies gas. Repeat abd xray still with SBO. Dilaudid given during NG adjustment per pt's request. NG advanced 10cm per Dr. Zafar and connected to low intermittent suction. Sips only. Brown low output. BG 93/107.  Dialysis today completed. Plan for Surgery f/u tomorrow. SW/OT/PT ordered. Continue to monitor.

## 2017-10-24 NOTE — PLAN OF CARE
Problem: Patient Care Overview  Goal: Plan of Care/Patient Progress Review  OT: Orders received and chart reviewed. Spoke with nursing. Will hold OT eval until surgery decides whether or not pt will need surgery.

## 2017-10-24 NOTE — PROGRESS NOTES
General Surgery Progress Note    Admission Date: 10/22/2017  Today's Date: 10/24/2017         Assessment:      Kathryn Banks is a 77 year old female with a small bowel obstruction         Plan:   Repeat abdominal XR now. If no improvement today, may need surgical exploration tomorrow. Discussed with patient who is in agreement.    Pain: minimal, dilaudid available  Bowel: rest  Diet: NPO, NG in place  DVT prophylaxis: PCDs, ambulation    Dispo: continue to monitor, likely several more days in hospital        Interval History:   Afebrile, VSS - requiring 0-1L O2 via nc. Tolerated NG clamping and sips of clears well yesterday, but still not flatus or BM. Feels bloated but denies abdominal pain or nausea. Has been ambulating in the halls, up for a shower today.          Physical Exam:   /78 (BP Location: Left arm)  Pulse 78  Temp 98.4  F (36.9  C) (Oral)  Resp 18  Wt 65.8 kg (145 lb 1 oz)  SpO2 94%  BMI 24.9 kg/m2  I/O last 3 completed shifts:  In: 1664 [P.O.:500; I.V.:1164]  Out: 700 [Urine:100; Emesis/NG output:100; Other:500]  General: NAD, pleasant, alert and oriented x3  Abdomen: somewhat firm, mild tenderness to palpation of lower abdomen, distended  Incision: clean, dry, and intact  Extremities: venous stasis bilaterally, no calf tenderness    LABS:  Recent Labs   Lab Test  10/24/17   0739  10/23/17   0809  10/22/17   0305   WBC  3.6*  4.7  7.4   HGB  9.1*  9.4*  10.4*   MCV  91  91  88   PLT  311  324  357      Recent Labs   Lab Test  10/24/17   0739  10/23/17   0809  10/22/17   0305   POTASSIUM  3.8  4.1  4.1   CHLORIDE  105  100  96   CO2  29  28  30   BUN  9  30  21   CR  2.69*  4.96*  3.91*   ANIONGAP  8  10  10                 Zo Shay PA-C  [facility]: [phone]  Pager: [phone]@[employer] (7am-5pm)

## 2017-10-25 ENCOUNTER — APPOINTMENT (OUTPATIENT)
Dept: PHYSICAL THERAPY | Facility: CLINIC | Age: 77
DRG: 329 | End: 2017-10-25
Attending: INTERNAL MEDICINE
Payer: MEDICARE

## 2017-10-25 LAB
GLUCOSE BLDC GLUCOMTR-MCNC: 103 MG/DL (ref 70–99)
GLUCOSE BLDC GLUCOMTR-MCNC: 110 MG/DL (ref 70–99)
GLUCOSE BLDC GLUCOMTR-MCNC: 115 MG/DL (ref 70–99)
GLUCOSE BLDC GLUCOMTR-MCNC: 118 MG/DL (ref 70–99)
GLUCOSE BLDC GLUCOMTR-MCNC: 120 MG/DL (ref 70–99)
GLUCOSE BLDC GLUCOMTR-MCNC: 138 MG/DL (ref 70–99)

## 2017-10-25 PROCEDURE — 25000128 H RX IP 250 OP 636: Performed by: INTERNAL MEDICINE

## 2017-10-25 PROCEDURE — 12000007 ZZH R&B INTERMEDIATE

## 2017-10-25 PROCEDURE — 25000132 ZZH RX MED GY IP 250 OP 250 PS 637: Mod: GY | Performed by: INTERNAL MEDICINE

## 2017-10-25 PROCEDURE — 25000125 ZZHC RX 250: Performed by: INTERNAL MEDICINE

## 2017-10-25 PROCEDURE — 99233 SBSQ HOSP IP/OBS HIGH 50: CPT | Performed by: INTERNAL MEDICINE

## 2017-10-25 PROCEDURE — 97116 GAIT TRAINING THERAPY: CPT | Mod: GP

## 2017-10-25 PROCEDURE — S5010 5% DEXTROSE AND 0.45% SALINE: HCPCS | Performed by: INTERNAL MEDICINE

## 2017-10-25 PROCEDURE — 40000193 ZZH STATISTIC PT WARD VISIT

## 2017-10-25 PROCEDURE — 00000146 ZZHCL STATISTIC GLUCOSE BY METER IP

## 2017-10-25 PROCEDURE — 25800025 ZZH RX 258: Performed by: INTERNAL MEDICINE

## 2017-10-25 PROCEDURE — 99207 ZZC CDG-MDM COMPONENT: MEETS MODERATE - UP CODED: CPT | Performed by: INTERNAL MEDICINE

## 2017-10-25 PROCEDURE — 90937 HEMODIALYSIS REPEATED EVAL: CPT

## 2017-10-25 PROCEDURE — A9270 NON-COVERED ITEM OR SERVICE: HCPCS | Mod: GY | Performed by: INTERNAL MEDICINE

## 2017-10-25 PROCEDURE — 63400005 ZZH RX 634: Performed by: INTERNAL MEDICINE

## 2017-10-25 PROCEDURE — 25000128 H RX IP 250 OP 636: Performed by: SURGERY

## 2017-10-25 PROCEDURE — 97161 PT EVAL LOW COMPLEX 20 MIN: CPT | Mod: GP

## 2017-10-25 PROCEDURE — 97110 THERAPEUTIC EXERCISES: CPT | Mod: GP

## 2017-10-25 RX ADMIN — ACETAMINOPHEN 1000 MG: 10 INJECTION, SOLUTION INTRAVENOUS at 13:06

## 2017-10-25 RX ADMIN — PANTOPRAZOLE SODIUM 40 MG: 40 INJECTION, POWDER, FOR SOLUTION INTRAVENOUS at 06:42

## 2017-10-25 RX ADMIN — ACETAMINOPHEN 1000 MG: 10 INJECTION, SOLUTION INTRAVENOUS at 04:22

## 2017-10-25 RX ADMIN — MICONAZOLE NITRATE: 2 POWDER TOPICAL at 08:39

## 2017-10-25 RX ADMIN — METOPROLOL TARTRATE 5 MG: 5 INJECTION INTRAVENOUS at 00:54

## 2017-10-25 RX ADMIN — LORAZEPAM 0.25 MG: 2 INJECTION INTRAMUSCULAR; INTRAVENOUS at 14:55

## 2017-10-25 RX ADMIN — HEPARIN SODIUM 3000 UNITS: 1000 INJECTION, SOLUTION INTRAVENOUS; SUBCUTANEOUS at 18:08

## 2017-10-25 RX ADMIN — METOPROLOL TARTRATE 5 MG: 5 INJECTION INTRAVENOUS at 06:42

## 2017-10-25 RX ADMIN — EPOETIN ALFA 4000 UNITS: 4000 SOLUTION INTRAVENOUS; SUBCUTANEOUS at 18:06

## 2017-10-25 RX ADMIN — METOPROLOL TARTRATE 5 MG: 5 INJECTION INTRAVENOUS at 13:06

## 2017-10-25 RX ADMIN — AMIODARONE HYDROCHLORIDE 200 MG: 200 TABLET ORAL at 08:37

## 2017-10-25 RX ADMIN — AMIODARONE HYDROCHLORIDE 200 MG: 200 TABLET ORAL at 21:12

## 2017-10-25 RX ADMIN — METOPROLOL TARTRATE 5 MG: 5 INJECTION INTRAVENOUS at 18:47

## 2017-10-25 RX ADMIN — DEXTROSE AND SODIUM CHLORIDE: 5; 450 INJECTION, SOLUTION INTRAVENOUS at 10:46

## 2017-10-25 NOTE — PROGRESS NOTES
Surgery Progress Note    Pt re-evaluated this afternoon and continues to be comfortable with minimal NG output. No nausea or significant abdominal pain. Please remove NG on return from dialysis. To remain NPO until ROBF.    Odin Santos MD  General Surgery PGY4  [phone]

## 2017-10-25 NOTE — ANESTHESIA POSTPROCEDURE EVALUATION
Patient: Kathryn Banks    Procedure(s):  REPAIR FOCAL ILEAL SMALL BOWEL PERFERATION, LYSIS OF ADHESIONS. - Wound Class: II-Clean Contaminated    Diagnosis:BOWEL OBSTRUCTION  Diagnosis Additional Information: No value filed.    Anesthesia Type:  General, ETT    Note:  Anesthesia Post Evaluation    Patient location during evaluation: PACU  Patient participation: Able to fully participate in evaluation  Level of consciousness: awake and alert  Pain management: adequate  Airway patency: patent  Cardiovascular status: acceptable  Respiratory status: acceptable  Hydration status: acceptable  PONV: none     Anesthetic complications: None          Last vitals:  Vitals:    10/24/17 2145 10/24/17 2200 10/24/17 2215   BP:  116/68    Pulse:      Resp: 16 16 16   Temp:      SpO2:  99%          Electronically Signed By: Lake Arellano MD  October 25, 2017  12:10 AM

## 2017-10-25 NOTE — PROGRESS NOTES
[facility]    Internal Medicine Hospitalist Progress Note  10/25/2017  I evaluated patient on the above date.    Juanjose Frost Jr., MD  [phone] (p)  Text Page (7 am to 6 pm)      Assessment & Plan   Ms. Kathryn Banks is a 77-year-old female with history including CAD, HTN, diet controlled DM, afib/flutter, ESRD (started HD last hospitalization), ANABEL, RLS, C. Diff, and recent hospitalizations, most recently at [facility] (10/2-10/18) for issues including pneumonia with septic shock and acute respiratory failure. She presented from TCU 10/22 with abdominal pain and distension with signs of SBO on imaging.    Distal ileal bowel obstruction with focal perforation due to adhesive bands -  s/p ex lap, ALISSA, repair of perforation 10/24/2017.   CT 10/22 showed closed-loop SBO. NG placed 10/22. Seen by Surgery and NG clamped 10/23 and seemed to tolerate, but more bloated and no flatus or BM thereafter. Underwent above surgery on 10/24.  - Post-op management per Surgery.  - Appreciate help from Dr. Zafar.    FEN.  - Change fluids from NS at 60 ml/hr --> D5 1/2 NS at 75 ml/hr.    Recent hospitalization with pneumonia with acute respiratory failure.  Finished antibiotics PTA.  - Monitor clinically.  - Continue PRN Duonebs.  - Wean O2 as able.    Atrial fibrillation/flutter.  H/o atrial flutter ablation 6/2017. Also h/o cardioversion. Issues with afib with RVR last hospitalization (10/2-10/18); started on amiodarone by Cardiology last stay. Started scheduled IV metoprolol this stay.  - Continue amiodarone as able.  - Continue metoprolol 5 mg IV q6h.  - Holding PTA warfarin for now.    ESRD on HD.  H/o CKD and was already planning for HD before last hospitalization. Last hospitalization was initiated on HD.  - Continue HD qMWF per Nephrology.     CAD.  Prior CABG 2007. Angiogram 4/2010 showed patent grafts with the exception of VG to RCA which was 100% occluded.  - Continue scheduled IV metoprolol.  - Resume  pravastatin as able.    Benign essential HTN.  Diastolic CHF.  Last echo 10/3/2017 showed LVEF 60-65%. Prior to last admission was on amlodipine, metoprolol and torsemide. Issues with septic shock last stay as well as ARF on CKD and starting dialysis. Not on any BP meds or diuretics prior to this admission 10/22; stable BP's after admission on IV metoprolol. BP's slightly elevated 10/25 may be related to pain/stress.  - Continue scheduled IV metoprolol 5 mg q6h.  - Consider IV enalaprilat.  - Monitor i/o's, daily wts.    DM2, diet controlled.  Hgb A1C 6.6 10/13/2017.  Recent Labs   Lab Test  10/25/17   0820  10/25/17   0411  10/25/17   0049  10/24/17   2101  10/24/17   1847   10/24/17   0739   10/23/17   0809   10/22/17   0305   10/16/17   0545   10/15/17   0700   GLC   --    --    --    --    --    --   86   --   66*   --   110*   --   102*   --   94   BGM  110*  120*  118*  108*  89   < >   --    < >   --    < >   --    < >   --    < >   --     < > = values in this interval not displayed.   - NPO now.  - Continue ISS.    ANABEL.  - Continue CPAP.    RLS.  - Resume PTA pramipexole as able.    Anemia, suspect chronic component.  No overt clinical signs of bleeding.     Recent Labs  Lab 10/24/17  0739 10/23/17  0809 10/22/17  0305   HGB 9.1* 9.4* 10.4*   - Continue PTA Aranesp per Nephrology.  - Monitor CBC.  - Consider prbc transfusion if hgb <8.0 or if significant bleeding with hemodynamic instability or if symptomatic.    ?PAD.  LACI studies 8/2017 showed abnormal LACI 0.64 L first digit; otherwise LACI normal bilaterally. PTA on Trental, though pt denies any claudication type symptoms.  - Resume Trental as able.    Deconditioning.  - Continue PT and OT.    Prophylaxis.  - PCD's, ambulation.  - IV Protonix.    CODE STATUS: FULL.    Dispo.  - Pending above.  - 2-3 more days at least.    Interval History   Doing fairly well. Ambulating with PT. Pain controlled. No flatus or BM yet.    -Data reviewed today: I reviewed all  "new labs and imaging over the last 24 hours. I personally reviewed no images or EKG's today.    Physical Exam   Heart Rate: 72, Blood pressure 147/80, pulse 70, temperature 97.8  F (36.6  C), temperature source Oral, resp. rate 16, height 1.626 m (5' 4\"), weight 68.6 kg (151 lb 3.8 oz), SpO2 98 %, not currently breastfeeding.  Vitals:    10/22/17 0247 10/23/17 1330 10/25/17 0600   Weight: 65.8 kg (145 lb) 65.8 kg (145 lb 1 oz) 68.6 kg (151 lb 3.8 oz)     Vital Signs with Ranges  Temp:  [97.8  F (36.6  C)-98.8  F (37.1  C)] 97.8  F (36.6  C)  Pulse:  [70] 70  Heart Rate:  [70-99] 72  Resp:  [7-24] 16  BP: (109-177)/() 147/80  SpO2:  [90 %-100 %] 98 %  Patient Vitals for the past 24 hrs:   BP Temp Temp src Pulse Heart Rate Resp SpO2 Height Weight   10/25/17 0737 147/80 97.8  F (36.6  C) Oral - 72 16 98 % - -   10/25/17 0600 - - - - - - - - 68.6 kg (151 lb 3.8 oz)   10/25/17 0300 137/67 98.1  F (36.7  C) Oral - 87 16 97 % - -   10/25/17 0051 115/65 - - - 80 16 98 % - -   10/24/17 2215 - - - - - 16 - - -   10/24/17 2200 116/68 - - - 86 16 99 % - -   10/24/17 2145 - - - - - 16 - - -   10/24/17 2130 123/70 98.2  F (36.8  C) - - 88 16 98 % - -   10/24/17 2100 134/74 - - - 88 16 98 % - -   10/24/17 2030 134/74 - - - 87 16 99 % - -   10/24/17 2000 132/74 98.2  F (36.8  C) Oral - 88 18 99 % 1.626 m (5' 4\") -   10/24/17 1950 109/83 - - - 90 20 98 % - -   10/24/17 1940 153/80 98.4  F (36.9  C) - - 90 10 98 % - -   10/24/17 1930 (!) 147/94 98.6  F (37  C) - - 92 10 98 % - -   10/24/17 1920 144/87 98.6  F (37  C) - - 92 24 98 % - -   10/24/17 1910 (!) 145/98 98.8  F (37.1  C) - - 93 10 99 % - -   10/24/17 1900 (!) 150/98 98.8  F (37.1  C) - - 94 14 99 % - -   10/24/17 1850 163/86 98.8  F (37.1  C) - - 93 (!) 7 100 % - -   10/24/17 1843 (!) 177/103 98.7  F (37.1  C) - - 99 9 100 % - -   10/24/17 1403 - - - - - - 95 % - -   10/24/17 1348 148/88 - - 70 70 18 90 % - -   10/24/17 1215 133/68 - - - 75 - - - -     I/O's Last 24 " hours  I/O last 3 completed shifts:  In: 1136 [I.V.:1136]  Out: 370 [Urine:350; Emesis/NG output:10; Blood:10]    Constitutional: Alert, oriented, pleasant.  Respiratory: Diminished in bases. No crackles or wheezes.  Cardiovascular: RRR no m/r/g.  GI: Mild distension, hypoactive.  Skin/Integumen:   Other:        Data     Recent Labs  Lab 10/24/17  0739 10/23/17  0809 10/22/17  2115 10/22/17  0305   WBC 3.6* 4.7  --  7.4   HGB 9.1* 9.4*  --  10.4*   MCV 91 91  --  88    324  --  357   INR  --  1.16* 1.45* 3.19*    138  --  136   POTASSIUM 3.8 4.1  --  4.1   CHLORIDE 105 100  --  96   CO2 29 28  --  30   BUN 9 30  --  21   CR 2.69* 4.96*  --  3.91*   ANIONGAP 8 10  --  10   MALICK 7.5* 7.9*  --  8.2*   GLC 86 66*  --  110*   ALBUMIN  --   --   --  2.5*   PROTTOTAL  --   --   --  6.2*   BILITOTAL  --   --   --  0.3   ALKPHOS  --   --   --  180*   ALT  --   --   --  9   AST  --   --   --  14   LIPASE  --   --   --  124     Recent Labs   Lab Test  10/25/17   0820  10/25/17   0411  10/25/17   0049  10/24/17   2101  10/24/17   1847   10/24/17   0739   10/23/17   0809   10/22/17   0305   10/16/17   0545   10/15/17   0700   GLC   --    --    --    --    --    --   86   --   66*   --   110*   --   102*   --   94   BGM  110*  120*  118*  108*  89   < >   --    < >   --    < >   --    < >   --    < >   --     < > = values in this interval not displayed.         Recent Results (from the past 24 hour(s))   XR Abdomen 2 Views    Narrative    ABDOMEN TWO VIEWS 10/24/2017 10:47 AM     HISTORY: Recheck small bowel obstruction, assess for improvement.    COMPARISON: 10/23/2017.      Impression    IMPRESSION: Dilatation of multiple small bowel loops with air-fluid  levels, slightly more prominent in gaseous distention of the small  bowel than on the previous exam. Nasogastric tube has been slightly  advanced into the stomach since yesterday's exam. No free air.  Elevated left diaphragm. Left pleural effusion.    STEPHANIE  MD ANGELICA       Medications   All medications were reviewed.    NaCl 10 mL/hr at 10/24/17 1352     - MEDICATION INSTRUCTIONS -       NaCl 60 mL/hr at 10/24/17 0900       sodium chloride 0.9%  250 mL Hemodialysis Machine Once     sodium chloride 0.9%  250-1,000 mL Intravenous Once in dialysis     epoetin hira (EPOGEN,PROCRIT) inj ESRD  4,000 Units Intravenous Once     acetaminophen  1,000 mg Intravenous Q8H     fluticasone  1 spray Both Nostrils Daily     pantoprazole  40 mg Intravenous QAM AC     metoprolol  5 mg Intravenous Q6H     miconazole   Topical BID     amiodarone (PACERONE/CODARONE) tablet 200 mg  200 mg Oral BID     - MEDICATION INSTRUCTIONS for Dialysis Patients -   Does not apply See Admin Instructions     insulin aspart  1-6 Units Subcutaneous Q4H

## 2017-10-25 NOTE — PROGRESS NOTES
EVELIO  D:  [facility] called for an update.  Explained d/c likely Friday or Saturday per MD note.  Therapy input will be valuable.   EVELIO needs to re-visit with patient regarding her d/c plan; back to [facility] vs home with [facility] RN and therapies.

## 2017-10-25 NOTE — PLAN OF CARE
Problem: Patient Care Overview  Goal: Plan of Care/Patient Progress Review  OT: Second attempt and patient gone at dialysis.

## 2017-10-25 NOTE — OP NOTE
DATE OF PROCEDURE:  10/24/2017      PREOPERATIVE DIAGNOSIS:  Small bowel obstruction secondary to adhesive bands.      POSTOPERATIVE DIAGNOSES:     1.  Distal ileal bowel obstruction secondary to adhesive bands.     2.  Focal perforation of distal 1/4 ileum secondary to adhesive band.      PROCEDURES:   1.  Exploratory laparotomy.   2.  Lysis of  adhesions.   3.  Repair of focal distal ileal perforation.      SURGEON:  Rashard Zafar MD      ASSISTANT:  YAN ARCINIEGA MD ([facility] [place]      ANESTHESIA:  General.      PREOPERATIVE MEDICATIONS:  Ancef 2 grams IV.      INDICATIONS:  Kathryn Banks is a 77-year-old patient who has undergone remote CARLEY/BSO and a more recent Jay-en-Y gastric bypass, has been quite ill recently.  She has noticed several days of abdominal discomfort.  The patient is admitted on 10/22/2017 with markedly distended abdomen and a CT scan indicative of significant adhesion of the distal ileum down in the pelvis causing a mechanical small-bowel obstruction.  The patient has been treated with NG decompression has actually been feeling very good with no abdominal pain.  However, she has had no flatus or stool and x-rays continue to show a distal bowel obstruction.  It was felt that this would not resolve on its own and exploratory surgery was indicated under informed consent.      DESCRIPTION OF PROCEDURE:  The patient was brought to operating room and induced under general anesthesia.  LMA was placed.  An NG tube was already in position.  A Cee catheter was placed.  Calf pneumatic compression boots were used, and a pillow was placed under the knees.  The abdomen was prepped and draped in the usual fashion.  A timeout was called and the sites were identified.      EXPLORATORY LAPAROTOMY:  An infraumbilical midline incision was made.  Dissection was carried down to the fascia.  The fascia was then opened with the aid of electrocautery.  The peritoneum was identified.  The peritoneum was  then entered and this was opened with the Metzenbaum scissors.  There were some adhesions of the omentum out to the anterior abdominal wall from her previous surgeries and these were taken down.  The patient had evidence of secondary ascites from the obstruction but no evidence of purulent fluid.  All loops of bowel were distended.  We then ran the bowel from the more proximal segment (did not go to the ligament of Treitz due to prior Jay-en-Y bypass).  We then followed the bowel to the more distal ileum.  One could see a significant adhesive band at the pelvic brim.  This was then divided under direct visualization with a Metzenbaum scissors.  We noticed the adhesive band effect on the ileum approximately 20 cm from the ileocecal valve.  There was a focal perforation closer to the mesentery at this level that was approximately 3 mm in diameter.  This was like an area that became ischemic, but was sealed off until we mobilized the bowel.  We had rapidly placed a 3-0 silk suture to prevent any spillage.      REPAIR FOCAL ILEAL PERFORATION:  The bowel itself was very viable once the adhesive bands had been released. The mesentery feeding this segment was thickened due to the obstruction which was chronic in nature but acutely worse causing the bowel obstruction  We had a widely patent lumen.  With the patient's immunocompromised state, I did not want to perform a segmental resection.  The mesentery adjacent was somewhat indurated due to the band but otherwise was unremarkable with excellent perfusion to the bowel.  We therefore isolated off this segment with sterile laparotomy pads.  We slightly opened the defect after placing proximal and distal bowel clamps and the intraluminal soft liquid stool was removed.  We had very viable tissue.  This defect was then approximated with three interrupted 3-0 silk sutures in a transverse fashion to prevent narrowing luminal diameter.  These were sewn.  Instruments were changed  and the area in question was irrigated with a dilute Betadine-saline solution.  We then reefed over the repair with Lembert type 3-0 silk sutures for additional reinforcement.  Now we had easy passage of the stool and air from the proximal to distal bowel beyond this segment.  We had followed the bowel into the cecum located approximately 20 cm distally from the area in question.      The abdominal cavity was irrigated with saline solution.  As mentioned, the perforation was very well contained with laparotomy pads and suction with no gross contamination.  Bowel contents were returned to normal anatomical position.  We had some omentum that was brought down to protect the bowel from the incision.  We were able to close the peritoneum with a running 3-0 Vicryl suture.  The fascia was closed with running looped 0 PDS suture.  Good solid fascia closure was noted.  Subcutaneous tissue irrigated with a dilute Betadine-saline solution.  Skin was closed with 4-0 Monocryl in subcuticular fashion, followed by sterile dressing.      The patient tolerated the procedure well.  Needle and sponge count correct x2.  Estimated blood loss was less than 20 mL.  She was extubated and returned to the recovery area in satisfactory condition.      OPERATIVE FINDINGS:  Adhesive band in deep pelvis approximately 20 cm from the ileocecal valve secondary to prior surgeries.  This has been released.  There was a focal perforation from the adhesive band that was primarily repaired.  The rest of the bowel was unremarkable.         LATOYA MARTINEZ MD             D: 10/24/2017 18:26   T: 10/24/2017 21:33   MT: [id number]      Name:     MARI HERNANDEZ   MRN:      [id number]        Account:        [id number]   :      1940           Procedure Date: 10/24/2017      Document: [id number]       cc: Latoya Martinez MD

## 2017-10-25 NOTE — PLAN OF CARE
Problem: Patient Care Overview  Goal: Discharge Needs Assessment  Outcome: No Change  Alert and oriented. VSS on 1L NC. NG at low intermittent suction with minimal output. NPO with ice chips. Denies nausea. Denies pain. Incision with dried drainage, no change. Cee patent, low urine output. Dangled at bedside. Plan for dialysis today. Tele NSR.

## 2017-10-25 NOTE — PROGRESS NOTES
10/25/17 1001   Quick Adds   Type of Visit Initial PT Evaluation   Living Environment   Lives With spouse;grandchild(zoe);other (see comments)  (2 friends, spouse, grandchild)   Living Arrangements house   Home Accessibility no concerns   Number of Stairs to Enter Home 0   Number of Stairs Within Home 0   Self-Care   Usual Activity Tolerance good   Current Activity Tolerance fair   Regular Exercise no   Equipment Currently Used at Home walker, rolling   Functional Level Prior   Ambulation 1-->assistive equipment   Transferring 1-->assistive equipment   Fall history within last six months no   Which of the above functional risks had a recent onset or change? none   General Information   Onset of Illness/Injury or Date of Surgery - Date 10/24/17   Referring Physician Odin Santos MD   Patient/Family Goals Statement return home   Pertinent History of Current Problem (include personal factors and/or comorbidities that impact the POC) Admitted on 10/22 with abdominal pain and SBO, now s/p ex lap, ALISSA, repair of perforation. PMH: afib with RVR, HTN, DM2, recent admit with pneumonia, AHRF, ARF with new dialysis, cdiff and discharged to TCU on 10/18.    Precautions/Limitations fall precautions;abdominal precautions   Weight-Bearing Status - LLE full weight-bearing   Weight-Bearing Status - RLE full weight-bearing   General Observations up in chair   Cognitive Status Examination   Orientation orientation to person, place and time   Level of Consciousness alert   Follows Commands and Answers Questions 100% of the time;able to follow multistep instructions   Personal Safety and Judgment intact   Memory intact   Pain Assessment   Patient Currently in Pain No  (0/10 at rest, pain with movement)   Posture    Posture Forward head position;Protracted shoulders   Range of Motion (ROM)   ROM Quick Adds No deficits were identified   Strength   Strength Comments Hip flex NT due to abdominal precautions, knee ext 4+/5, DF 3+/5, hip  "abduction 4/5, adduction 4/5   Bed Mobility   Bed Mobility Comments NT   Transfer Skills   Transfer Comments Sit to stand with SBA and FWW   Gait   Gait Comments Pt amb 10 ft with FWW and CGA, noted leg length discrepancy of 1.5 inches    Balance   Balance Comments Balance mildy dec due to leg length discrepancy   Sensory Examination   Sensory Perception Comments States baseline neuropathy    General Therapy Interventions   Planned Therapy Interventions bed mobility training;gait training;strengthening;transfer training   Clinical Impression   Criteria for Skilled Therapeutic Intervention yes, treatment indicated   PT Diagnosis Difficulty ambulating   Influenced by the following impairments Pain, dec strength, balance, activity tolerance   Functional limitations due to impairments Difficulty ambulating and transferring   Clinical Presentation Stable/Uncomplicated   Clinical Presentation Rationale medically stable   Clinical Decision Making (Complexity) Low complexity   Therapy Frequency` daily   Predicted Duration of Therapy Intervention (days/wks) 4 days   Anticipated Discharge Disposition Home with Home Therapy   Risk & Benefits of therapy have been explained Yes   Patient, Family & other staff in agreement with plan of care Yes   [facility] [employer] TM \"6 Clicks\"   2016, Trustees of [facility], under license to [employer].  All rights reserved.   6 Clicks Short Forms Basic Mobility Inpatient Short Form   [facility] AM-PAC  \"6 Clicks\" V.2 Basic Mobility Inpatient Short Form   1. Turning from your back to your side while in a flat bed without using bedrails? 4 - None   2. Moving from lying on your back to sitting on the side of a flat bed without using bedrails? 3 - A Little   3. Moving to and from a bed to a chair (including a wheelchair)? 3 - A Little   4. Standing up from a chair using your arms (e.g., wheelchair, or bedside chair)? 3 - A Little   5. To walk in hospital room? 3 - A Little "   6. Climbing 3-5 steps with a railing? 2 - A Lot   Basic Mobility Raw Score (Score out of 24.Lower scores equate to lower levels of function) 18   Total Evaluation Time   Total Evaluation Time (Minutes) 15

## 2017-10-25 NOTE — PROGRESS NOTES
Potassium   Date Value Ref Range Status   10/24/2017 3.8 3.4 - 5.3 mmol/L Final   ]  Lab Results   Component Value Date    HGB 9.1 10/24/2017     Weight: 68.6 kg (151 lb 3.8 oz)  POST WT  DIALYSIS PROCEDURE NOTE  Hepatitis status of previous patient on machine log was checked and ok to use with this patient hepatitis status.  Patient dialyzed for 3 hrs on a 3 K bath with a  net fluid removal of 0.6L.  A BFR of 400ml/min was obtained via RIJ.  Patient was seen by Dr. Rich during treatment.  Total heparin received during treatment:: 0 units.  Heparin instilled in both ports post run.  Meds given: Epogen. Complications: None.   Procedure and ESRD teaching done and questions answered.  See flowsheet in EPIC for further details and post assessment.  Machine water alarm in place and functioning.  HEPATITIS B SURFACE ANTIGEN Non-Reactive Date 10/6/17   HEPATITIS B SURFACE ANTIBODY Immune DATE 10/6/17  CHLORINE AND CHLORAMINES CHECK on WATER SYSTEM EVERY 4 hours.   PT returned via WC  Catheter Access: Aseptic prep done for both on/off.  Report received from: ALVARO Rose RN  Report given to: ALVARO Rose RN  Outpatient Dialysis at [facility]

## 2017-10-25 NOTE — PLAN OF CARE
Problem: Patient Care Overview  Goal: Plan of Care/Patient Progress Review  Outcome: Improving  AOx4, VSS, CMS intact, NG in left naustril at low intermittent suction.  NPO but tolerating ice chips.  Denies nausea, pain treated with IV tylenol.  On 2L O2, sats of 97%.  Abdomen tender and round, abdominal incision with scant drainage marked on dressing, left chest dressing to cover dialysis port.  Cee patent, low output.  Patient due to ambulate.  Will continue to monitor.

## 2017-10-25 NOTE — PROGRESS NOTES
[facility]    General Surgery  Daily Post-Op Note       Assessment and Plan:   Kathryn Banks is a 77 year old female S/P Procedure(s):  COMBINED LAPAROTOMY, LYSIS ADHESIONS, 1 Day Post-Op    Pain: Completely controlled on IV tylenol for 23 hrs. PRN dilaudid available.  Bowel: Mild distention NG with minimal output, no flatus  Diet: NPO except ice chips. Will re-eval for NG removal this afternoon.  IVFs: Continue on 60ml/hr.  Activity: Up OOB frequently.  Antibiotics: 24 hr dosing of ertapenem from OR adequate. Observe for fevers or clinical change.  DVT prophylaxis: OK for heparin from surgical perspective   Dispo: Several days until ileus resolves.        Interval History:   Comfortable overnight. No significant pain or nausea.           Physical Exam:   Temp: 97.8  F (36.6  C) Temp src: Oral BP: 147/80 Pulse: 70 Heart Rate: 72 Resp: 16 SpO2: 98 % O2 Device: None (Room air) Oxygen Delivery: 2 LPM    I/O last 3 completed shifts:  In: 1136 [I.V.:1136]  Out: 370 [Urine:350; Emesis/NG output:10; Blood:10]      Constitutional: alert and no distress   Cardiovascular: HR normal.  Respiratory: Breathing easily.  Abdomen: Soft, mild distention, nontender. Dressing clean and intact.     Odin Santos MD  General Surgery PGY4  [phone]    STAFF:  Doing well   Wd=A   Occ BS.  ? NG out later today.                      Rashard Zafar MD

## 2017-10-25 NOTE — PLAN OF CARE
"Problem: Patient Care Overview  Goal: Plan of Care/Patient Progress Review  PT: Orders received, eval completed, treatment initiated. Pt admitted with abdominal pain and SBO, now s/p ex lap, ALISSA, repair of perforation. Prior to admit pt was living with her spouse, 2 friends and 2 grandchildren in a house, no stairs, independent with mobility with use of FWW or 4ww. Pt demonstrates dec strength, balance, activity tolerance and difficulty ambulating and transferring and would benefit from skilled PT services in order to improve this.      Discharge Planner PT   Patient plan for discharge: Return home with home cares and family support  Current status: Currently requires SBA sit to/from stand, CGA progressing to SBA for gait of 175 ft with FWW with short standing rest breaks. Participated well in LE strengthening. Pt is very motivated to improve and return straight home. Denies pain with all activity and states she feels \"much better.\"  Barriers to return to prior living situation: None, has good family support, no stairs, already uses FWW/4ww.  Recommendations for discharge: Return home with home PT, OT, RN, HHA.  Rationale for recommendations: Pt would benefit from continued PT to improve strength, balance, mobility to increase independence, reduce falls risk. Safe to return home instead of back to TCU.       Entered by: Briana Kessler 10/25/2017 11:05 AM             "

## 2017-10-25 NOTE — PLAN OF CARE
Problem: Patient Care Overview  Goal: Plan of Care/Patient Progress Review  OT: Orders rec'd. Patient busy with nursing staff on attempt.

## 2017-10-25 NOTE — PROGRESS NOTES
Inpatient Dialysis Progress Note            Assessment and Plan:     Admitted with concern for SBO      Seen regarding management of ESRD      1.  ESRD                        -MWF schedule                        -[facility]18/17                        [facility]  2.  Initiated dialysis last admission                        -tunneled line  3.  Anemia  4.  Secondary hyperparathyroidism  5.  SBO - S/P expl lap.  Better.       Plan:     Minimal UF  Next HD Friday.              Interval History:   S/P expl lap.  Adhesive band leading to SBO and even small perforation.    Feels better.  NG is to come out.  She will have sips of clears.  No flatus yet.        Dialysis Parameters:     Wt Readings from Last 4 Encounters:   10/25/17 68.6 kg (151 lb 3.8 oz)   10/18/17 62.2 kg (137 lb 2 oz)   10/01/17 77.3 kg (170 lb 8 oz)   09/22/17 77.1 kg (170 lb)     I/O last 3 completed shifts:  In: 1369 [I.V.:1339; NG/GT:30]  Out: 190 [Urine:180; Blood:10]  BP Readings from Last 3 Encounters:   10/25/17 122/65   10/20/17 117/64   10/18/17 112/66       Routine, ONE TIME, Starting today For 1 Occurrences  Weight Loss (kg): 0-1  Dialysis Temp: 36.5  C  Access Device: CVC  Access Site: R IJ  Dialyzer: Revaclear  Dialysis Bath: K 3  Blood Flow Rate (mL/min): 400  Total Treatment Time (hrs): 3  Heparin: none         Medications and Allergies:   Reviewed in EPIC      sodium chloride 0.9%  250 mL Hemodialysis Machine Once     sodium chloride 0.9%  250-1,000 mL Intravenous Once in dialysis     epoetin hira (EPOGEN,PROCRIT) inj ESRD  4,000 Units Intravenous Once     fluticasone  1 spray Both Nostrils Daily     pantoprazole  40 mg Intravenous QAM AC     metoprolol  5 mg Intravenous Q6H     miconazole   Topical BID     amiodarone (PACERONE/CODARONE) tablet 200 mg  200 mg Oral BID     - MEDICATION INSTRUCTIONS for Dialysis Patients -   Does not apply See Admin Instructions     insulin aspart  1-6 Units Subcutaneous Q4H      melatonin, lidocaine (buffered or not buffered), sodium chloride 0.9%, - MEDICATION INSTRUCTIONS -, heparin, heparin, HYDROmorphone, sore throat lozenge, LORazepam, ipratropium - albuterol 0.5 mg/2.5 mg/3 mL, nitroGLYcerin, naloxone, HYDROmorphone, bisacodyl, ondansetron **OR** ondansetron, prochlorperazine **OR** prochlorperazine **OR** prochlorperazine, HOLD MEDICATION, hypromellose-dextran, glucose **OR** dextrose **OR** glucagon     Allergies   Allergen Reactions     Ciprofloxacin Nausea and Vomiting     Zofran did not help     Oxycodone Visual Disturbance     Delusions, blackouts      Lisinopril Cough     Sulfa Drugs GI Disturbance     LOSS OF TASTE     Penicillins Other (See Comments)     Swelling and reddness at injection site, pt wanted it on               Labs:     BMP  Recent Labs  Lab 10/24/17  0739 10/23/17  0809 10/22/17  0305    138 136   POTASSIUM 3.8 4.1 4.1   CHLORIDE 105 100 96   MALICK 7.5* 7.9* 8.2*   CO2 29 28 30   BUN 9 30 21   CR 2.69* 4.96* 3.91*   GLC 86 66* 110*     CBC  Recent Labs  Lab 10/24/17  0739 10/23/17  0809 10/22/17  0305   WBC 3.6* 4.7 7.4   HGB 9.1* 9.4* 10.4*   HCT 30.0* 30.7* 33.1*   MCV 91 91 88    324 357     Lab Results   Component Value Date    AST 14 10/22/2017    ALT 9 10/22/2017    GGT 18 12/02/2004    ALKPHOS 180 (H) 10/22/2017    BILITOTAL 0.3 10/22/2017    BILICONJ 0.0 07/05/2007            Physical Exam:   Vitals were reviewed in [facility]    Wt Readings from Last 3 Encounters:   10/25/17 68.6 kg (151 lb 3.8 oz)   10/18/17 62.2 kg (137 lb 2 oz)   10/01/17 77.3 kg (170 lb 8 oz)       Intake/Output Summary (Last 24 hours) at 10/25/17 1706  Last data filed at 10/25/17 1400   Gross per 24 hour   Intake             1369 ml   Output              190 ml   Net             1179 ml       GENERAL APPEARANCE: pleasant, no distress, a & o  HEENT:  Eyes/ears/nose grossly normal, neck supple  RESP: lungs clear to auscultation with good efforts, no crackles, rhonchi or  wheezes  CV: regular rate and rhythm, normal S1 S2, no murmur, click or rub   ABDOMEN: soft, nontender, BS reduced  EXTREMITIES/SKIN: no edema, no rashes or lesions     Pt seen on dialysis.  Stable run.  Good BFR.      Attestation:  I have reviewed today's vital signs, notes, medications, labs and imaging.     Micheal Rich MD  [facility] - Nephrology  [phone]

## 2017-10-25 NOTE — PLAN OF CARE
Problem: Patient Care Overview  Goal: Plan of Care/Patient Progress Review  Outcome: Improving  VSS.  A&O.  NPO, ice chips.  Up with assist of 1, belt and walker.  Cee patent.  Insulin sliding scale.  Incision c/d/i.  Pulled NG, no drainage during shift.  Denies pain, reports of baseline intermittent numbness and tingling sensation in lower extremities.  Dialysis.  BS active.  No gas. NG to be pulled.

## 2017-10-26 ENCOUNTER — APPOINTMENT (OUTPATIENT)
Dept: OCCUPATIONAL THERAPY | Facility: CLINIC | Age: 77
DRG: 329 | End: 2017-10-26
Payer: MEDICARE

## 2017-10-26 ENCOUNTER — APPOINTMENT (OUTPATIENT)
Dept: PHYSICAL THERAPY | Facility: CLINIC | Age: 77
DRG: 329 | End: 2017-10-26
Payer: MEDICARE

## 2017-10-26 LAB
ANION GAP SERPL CALCULATED.3IONS-SCNC: 7 MMOL/L (ref 3–14)
BASOPHILS # BLD AUTO: 0 10E9/L (ref 0–0.2)
BASOPHILS NFR BLD AUTO: 0.3 %
BUN SERPL-MCNC: 7 MG/DL (ref 7–30)
CALCIUM SERPL-MCNC: 7.3 MG/DL (ref 8.5–10.1)
CHLORIDE SERPL-SCNC: 103 MMOL/L (ref 94–109)
CO2 SERPL-SCNC: 29 MMOL/L (ref 20–32)
CREAT SERPL-MCNC: 2.22 MG/DL (ref 0.52–1.04)
DIFFERENTIAL METHOD BLD: ABNORMAL
EOSINOPHIL # BLD AUTO: 0.1 10E9/L (ref 0–0.7)
EOSINOPHIL NFR BLD AUTO: 2.2 %
ERYTHROCYTE [DISTWIDTH] IN BLOOD BY AUTOMATED COUNT: 15.9 % (ref 10–15)
GFR SERPL CREATININE-BSD FRML MDRD: 22 ML/MIN/1.7M2
GLUCOSE BLDC GLUCOMTR-MCNC: 113 MG/DL (ref 70–99)
GLUCOSE BLDC GLUCOMTR-MCNC: 115 MG/DL (ref 70–99)
GLUCOSE BLDC GLUCOMTR-MCNC: 115 MG/DL (ref 70–99)
GLUCOSE BLDC GLUCOMTR-MCNC: 118 MG/DL (ref 70–99)
GLUCOSE BLDC GLUCOMTR-MCNC: 127 MG/DL (ref 70–99)
GLUCOSE BLDC GLUCOMTR-MCNC: 145 MG/DL (ref 70–99)
GLUCOSE SERPL-MCNC: 107 MG/DL (ref 70–99)
HCT VFR BLD AUTO: 29.4 % (ref 35–47)
HGB BLD-MCNC: 8.9 G/DL (ref 11.7–15.7)
IMM GRANULOCYTES # BLD: 0.1 10E9/L (ref 0–0.4)
IMM GRANULOCYTES NFR BLD: 1 %
LYMPHOCYTES # BLD AUTO: 0.8 10E9/L (ref 0.8–5.3)
LYMPHOCYTES NFR BLD AUTO: 14.2 %
MCH RBC QN AUTO: 27.3 PG (ref 26.5–33)
MCHC RBC AUTO-ENTMCNC: 30.3 G/DL (ref 31.5–36.5)
MCV RBC AUTO: 90 FL (ref 78–100)
MONOCYTES # BLD AUTO: 0.4 10E9/L (ref 0–1.3)
MONOCYTES NFR BLD AUTO: 7 %
NEUTROPHILS # BLD AUTO: 4.4 10E9/L (ref 1.6–8.3)
NEUTROPHILS NFR BLD AUTO: 75.3 %
NRBC # BLD AUTO: 0 10*3/UL
NRBC BLD AUTO-RTO: 0 /100
PLATELET # BLD AUTO: 310 10E9/L (ref 150–450)
POTASSIUM SERPL-SCNC: 3.4 MMOL/L (ref 3.4–5.3)
RBC # BLD AUTO: 3.26 10E12/L (ref 3.8–5.2)
SODIUM SERPL-SCNC: 139 MMOL/L (ref 133–144)
WBC # BLD AUTO: 5.9 10E9/L (ref 4–11)

## 2017-10-26 PROCEDURE — 97116 GAIT TRAINING THERAPY: CPT | Mod: GP

## 2017-10-26 PROCEDURE — 25000128 H RX IP 250 OP 636: Performed by: INTERNAL MEDICINE

## 2017-10-26 PROCEDURE — 40000133 ZZH STATISTIC OT WARD VISIT: Performed by: OCCUPATIONAL THERAPIST

## 2017-10-26 PROCEDURE — 00000146 ZZHCL STATISTIC GLUCOSE BY METER IP

## 2017-10-26 PROCEDURE — 97535 SELF CARE MNGMENT TRAINING: CPT | Mod: GO | Performed by: OCCUPATIONAL THERAPIST

## 2017-10-26 PROCEDURE — 85025 COMPLETE CBC W/AUTO DIFF WBC: CPT | Performed by: INTERNAL MEDICINE

## 2017-10-26 PROCEDURE — S5010 5% DEXTROSE AND 0.45% SALINE: HCPCS | Performed by: INTERNAL MEDICINE

## 2017-10-26 PROCEDURE — 97165 OT EVAL LOW COMPLEX 30 MIN: CPT | Mod: GO | Performed by: OCCUPATIONAL THERAPIST

## 2017-10-26 PROCEDURE — 12000007 ZZH R&B INTERMEDIATE

## 2017-10-26 PROCEDURE — 80048 BASIC METABOLIC PNL TOTAL CA: CPT | Performed by: INTERNAL MEDICINE

## 2017-10-26 PROCEDURE — 99232 SBSQ HOSP IP/OBS MODERATE 35: CPT | Performed by: INTERNAL MEDICINE

## 2017-10-26 PROCEDURE — 97110 THERAPEUTIC EXERCISES: CPT | Mod: GP

## 2017-10-26 PROCEDURE — A9270 NON-COVERED ITEM OR SERVICE: HCPCS | Mod: GY | Performed by: INTERNAL MEDICINE

## 2017-10-26 PROCEDURE — 40000193 ZZH STATISTIC PT WARD VISIT

## 2017-10-26 PROCEDURE — 36415 COLL VENOUS BLD VENIPUNCTURE: CPT | Performed by: INTERNAL MEDICINE

## 2017-10-26 PROCEDURE — 25800025 ZZH RX 258: Performed by: INTERNAL MEDICINE

## 2017-10-26 PROCEDURE — 25000132 ZZH RX MED GY IP 250 OP 250 PS 637: Mod: GY | Performed by: INTERNAL MEDICINE

## 2017-10-26 PROCEDURE — 25000125 ZZHC RX 250: Performed by: INTERNAL MEDICINE

## 2017-10-26 RX ADMIN — METOPROLOL TARTRATE 5 MG: 5 INJECTION INTRAVENOUS at 00:41

## 2017-10-26 RX ADMIN — INSULIN ASPART 1 UNITS: 100 INJECTION, SOLUTION INTRAVENOUS; SUBCUTANEOUS at 11:48

## 2017-10-26 RX ADMIN — AMIODARONE HYDROCHLORIDE 200 MG: 200 TABLET ORAL at 21:52

## 2017-10-26 RX ADMIN — FLUTICASONE PROPIONATE 1 SPRAY: 50 SPRAY, METERED NASAL at 10:13

## 2017-10-26 RX ADMIN — DEXTROSE AND SODIUM CHLORIDE: 5; 450 INJECTION, SOLUTION INTRAVENOUS at 12:38

## 2017-10-26 RX ADMIN — METOPROLOL TARTRATE 5 MG: 5 INJECTION INTRAVENOUS at 13:49

## 2017-10-26 RX ADMIN — MICONAZOLE NITRATE: 2 POWDER TOPICAL at 10:13

## 2017-10-26 RX ADMIN — LORAZEPAM 0.25 MG: 2 INJECTION INTRAMUSCULAR; INTRAVENOUS at 22:08

## 2017-10-26 RX ADMIN — DEXTROSE AND SODIUM CHLORIDE: 5; 450 INJECTION, SOLUTION INTRAVENOUS at 00:51

## 2017-10-26 RX ADMIN — METOPROLOL TARTRATE 5 MG: 5 INJECTION INTRAVENOUS at 06:54

## 2017-10-26 RX ADMIN — PANTOPRAZOLE SODIUM 40 MG: 40 INJECTION, POWDER, FOR SOLUTION INTRAVENOUS at 06:54

## 2017-10-26 RX ADMIN — LORAZEPAM 0.25 MG: 2 INJECTION INTRAMUSCULAR; INTRAVENOUS at 01:13

## 2017-10-26 RX ADMIN — METOPROLOL TARTRATE 5 MG: 5 INJECTION INTRAVENOUS at 19:15

## 2017-10-26 RX ADMIN — AMIODARONE HYDROCHLORIDE 200 MG: 200 TABLET ORAL at 08:23

## 2017-10-26 NOTE — PLAN OF CARE
Problem: Patient Care Overview  Goal: Plan of Care/Patient Progress Review  PT: Discharge Planner PT   Patient plan for discharge: Home with home PT  Current status: Pt tolerated session well. She was able to ambulate further than yesterday, approximately 200ft with CGA and FWW, however, did require short standing rest breaks every 10-15ft.  Increased reps of seated exercises, will progress to more standing as she tolerates. She is eager to continue with PT and to improve strength and walking endurance.    Requires SBA for bed mobility and transfers.  Barriers to return to prior living situation: Decreased activity tolerance, falls risk            Recommendations for discharge: TCU, if pt refuses, home with home PT  Rationale for recommendations: She would benefit from continued therapy at TCU to improve strength, walking tolerance and to decrease her risk of falls.       Entered by: Zo Mcclendon 10/26/2017 9:13 AM

## 2017-10-26 NOTE — PROGRESS NOTES
[facility]    General Surgery  Daily Post-Op Note       Assessment and Plan:   Kathryn Banks is a 77 year old female S/P Procedure(s):  COMBINED LAPAROTOMY, LYSIS ADHESIONS, 2 Days Post-Op    Pain: No current pain issues.  Bowel: NG removed yesterday without issue, no nausea overnight. No flatus yet  Diet: Continue NPO. OK for small sips of juice tea etc.  IVFs: Continue while NPO. D/C Cee catheter.  Activity: Continue frequent ambulation. Appreciate therapy assistance.  Antibiotics: 23 hr lenka-op completed. Afebrile. Wound looks great.  DVT prophylaxis: OK for heparin subq but frequently ambulating and SCDs.  Dispo: Awaiting bowel function.        Interval History:   Comfortable overnight. Grateful for NG removal.           Physical Exam:   Temp: 98.1  F (36.7  C) Temp src: Oral BP: 130/66 Pulse: 75 Heart Rate: 77 Resp: 16 SpO2: 90 % O2 Device: None (Room air)      I/O last 3 completed shifts:  In: 669 [I.V.:639; NG/GT:30]  Out: 680 [Urine:80; Other:600]      Constitutional: healthy, alert and no distress   Cardiovascular: Regular rate.  Respiratory: Breathing RA comfortably.  Abdomen: Outer dressing removed. Incision dry intact and no evidence of infection.    Data     Recent Labs  Lab 10/26/17  0734 10/24/17  0739 10/23/17  0809 10/22/17  2115 10/22/17  0305   WBC 5.9 3.6* 4.7  --  7.4   HGB 8.9* 9.1* 9.4*  --  10.4*   MCV 90 91 91  --  88    311 324  --  357   INR  --   --  1.16* 1.45* 3.19*    142 138  --  136   POTASSIUM 3.4 3.8 4.1  --  4.1   CHLORIDE 103 105 100  --  96   CO2 29 29 28  --  30   BUN 7 9 30  --  21   CR 2.22* 2.69* 4.96*  --  3.91*   ANIONGAP 7 8 10  --  10   MALICK 7.3* 7.5* 7.9*  --  8.2*   * 86 66*  --  110*   ALBUMIN  --   --   --   --  2.5*   PROTTOTAL  --   --   --   --  6.2*   BILITOTAL  --   --   --   --  0.3   ALKPHOS  --   --   --   --  180*   ALT  --   --   --   --  9   AST  --   --   --   --  14       Odin Santos MD  General Surgery  "PGY4  [phone]      STAFF:\"  Agree.  Go slowy on diet due to ileus.  Remove Cee.  Done with antibiotic for focal ileal perforation.       Rashard Zafar MD   "

## 2017-10-26 NOTE — PROGRESS NOTES
[facility]    Internal Medicine Hospitalist Progress Note  10/26/2017  I evaluated patient on the above date.    Juanjose Frost Jr., MD  [phone] (p)  Text Page (7 am to 6 pm)      Assessment & Plan   Ms. Kathryn Banks is a 77-year-old female with history including CAD, HTN, diet controlled DM, afib/flutter, ESRD (started HD last hospitalization), ANABEL, RLS, C. Diff, and recent hospitalizations, most recently at [facility] (10/2-10/18) for issues including pneumonia with septic shock and acute respiratory failure. She presented from TCU 10/22 with abdominal pain and distension with signs of SBO on imaging.    Distal ileal bowel obstruction with focal perforation due to adhesive bands -  s/p ex lap, ALISSA, repair of perforation 10/24/2017.   CT 10/22 showed closed-loop SBO. NG placed 10/22. Seen by Surgery and NG clamped 10/23 and seemed to tolerate, but more bloated and no flatus or BM thereafter. Underwent above surgery on 10/24. NG d/c'ed 10/25.  - Post-op management per Surgery.  - Appreciate help from Dr. Zafar.    FEN.  - Continue D5 1/2 NS at 75 ml/hr.    Recent hospitalization with pneumonia with acute respiratory failure.  Finished antibiotics PTA.  - Monitor clinically.  - Continue PRN Duonebs.    Atrial fibrillation/flutter.  H/o atrial flutter ablation 6/2017. Also h/o cardioversion. Issues with afib with RVR last hospitalization (10/2-10/18); started on amiodarone by Cardiology last stay. Started scheduled IV metoprolol this stay.  - Continue amiodarone as able.  - Continue metoprolol 5 mg IV q6h.  - Holding PTA warfarin for now, resume when OK with Surgery.    ESRD on HD.  H/o CKD and was already planning for HD before last hospitalization. Last hospitalization was initiated on HD.  - Continue HD qMWF per Nephrology.  - Appreciate help from Dr. Rich.     CAD.  Prior CABG 2007. Angiogram 4/2010 showed patent grafts with the exception of VG to RCA which was 100% occluded.  - Continue scheduled  IV metoprolol.  - Resume pravastatin as able.    Benign essential HTN.  Diastolic CHF.  Last echo 10/3/2017 showed LVEF 60-65%. Prior to last admission was on amlodipine, metoprolol and torsemide. Issues with septic shock last stay as well as ARF on CKD and starting dialysis. Not on any BP meds or diuretics prior to this admission 10/22; stable BP's after admission on IV metoprolol. BP's slightly elevated 10/25 may be related to pain/stress.  - Continue scheduled IV metoprolol 5 mg q6h.  - Consider IV enalaprilat.  - Monitor i/o's, daily wts.    DM2, diet controlled.  Hgb A1C 6.6 10/13/2017.  Recent Labs   Lab Test  10/26/17   0748  10/26/17   0734  10/26/17   0436  10/26/17   0030  10/25/17   2019  10/25/17   1609   10/24/17   0739   10/23/17   0809   10/22/17   0305   10/16/17   0545   GLC   --   107*   --    --    --    --    --   86   --   66*   --   110*   --   102*   BGM  115*   --   127*  113*  103*  115*   < >   --    < >   --    < >   --    < >   --     < > = values in this interval not displayed.   - NPO now.  - Continue ISS.    ANABEL.  - Continue CPAP.    RLS.  - Resume PTA pramipexole as able.    Anemia, suspect chronic component.  No overt clinical signs of bleeding.   Recent Labs  Lab 10/26/17  0734 10/24/17  0739 10/23/17  0809 10/22/17  0305   HGB 8.9* 9.1* 9.4* 10.4*   - Continue PTA Aranesp per Nephrology.  - Monitor CBC.  - Consider prbc transfusion if hgb <8.0 or if significant bleeding with hemodynamic instability or if symptomatic.    ?PAD.  LACI studies 8/2017 showed abnormal LACI 0.64 L first digit; otherwise LACI normal bilaterally. PTA on Trental, though pt denies any claudication type symptoms.  - Resume Trental as able.    Deconditioning.  - Continue PT and OT.    Prophylaxis.  - PCD's, ambulation.  - IV Protonix.    CODE STATUS: FULL.    Dispo.  - Pending above.  - 1-2 more days.    Interval History   NG d/c'ed yesterday.  Just had a shower. Feeling much better. No BM or flatus yet. No  "N/V.    -Data reviewed today: I reviewed all new labs and imaging over the last 24 hours. I personally reviewed no images or EKG's today.    Physical Exam   Heart Rate: 77, Blood pressure 130/66, pulse 75, temperature 98.1  F (36.7  C), temperature source Oral, resp. rate 16, height 1.626 m (5' 4\"), weight 68.6 kg (151 lb 3.8 oz), SpO2 90 %, not currently breastfeeding.  Vitals:    10/22/17 0247 10/23/17 1330 10/25/17 0600   Weight: 65.8 kg (145 lb) 65.8 kg (145 lb 1 oz) 68.6 kg (151 lb 3.8 oz)     Vital Signs with Ranges  Temp:  [97.5  F (36.4  C)-98.1  F (36.7  C)] 98.1  F (36.7  C)  Pulse:  [75] 75  Heart Rate:  [68-88] 77  Resp:  [16] 16  BP: (108-147)/(54-78) 130/66  SpO2:  [90 %-98 %] 90 %  Patient Vitals for the past 24 hrs:   BP Temp Temp src Pulse Heart Rate Resp SpO2   10/26/17 0750 130/66 98.1  F (36.7  C) Oral 75 - 16 90 %   10/26/17 0652 132/66 - - - 77 - -   10/26/17 0433 132/67 97.5  F (36.4  C) Oral - 77 16 98 %   10/26/17 0037 131/67 97.5  F (36.4  C) Oral - 83 16 98 %   10/25/17 1844 110/66 - - - 88 16 -   10/25/17 1815 133/68 97.7  F (36.5  C) Axillary - 83 - -   10/25/17 1800 124/63 - - - 81 - -   10/25/17 1745 128/66 - - - 76 - -   10/25/17 1730 128/67 - - - 78 - -   10/25/17 1715 125/61 - - - 74 - -   10/25/17 1700 129/67 - - - 75 - -   10/25/17 1645 122/65 - - - 74 - -   10/25/17 1630 124/66 - - - 74 - -   10/25/17 1615 119/64 - - - 75 - -   10/25/17 1600 124/66 - - - 72 - -   10/25/17 1545 126/61 - - - 72 - -   10/25/17 1530 132/74 - - - 74 - -   10/25/17 1515 147/78 - - - 68 - -   10/25/17 1509 120/60 97.5  F (36.4  C) Axillary - 73 16 -   10/25/17 1304 122/69 - - - 79 - -   10/25/17 1215 - - - - - 16 -   10/25/17 1124 108/54 97.8  F (36.6  C) Oral - 76 16 98 %     I/O's Last 24 hours  I/O last 3 completed shifts:  In: 669 [I.V.:639; NG/GT:30]  Out: 680 [Urine:80; Other:600]    Constitutional: Alert, oriented, pleasant.  Respiratory: Diminished in bases. No crackles or " wheezes.  Cardiovascular: RRR no m/r/g.  GI: Less distension, increased BS.  Skin/Integumen: No lower extremity edema.  Other:        Data     Recent Labs  Lab 10/26/17  0734 10/24/17  0739 10/23/17  0809 10/22/17  2115 10/22/17  0305   WBC 5.9 3.6* 4.7  --  7.4   HGB 8.9* 9.1* 9.4*  --  10.4*   MCV 90 91 91  --  88    311 324  --  357   INR  --   --  1.16* 1.45* 3.19*    142 138  --  136   POTASSIUM 3.4 3.8 4.1  --  4.1   CHLORIDE 103 105 100  --  96   CO2 29 29 28  --  30   BUN 7 9 30  --  21   CR 2.22* 2.69* 4.96*  --  3.91*   ANIONGAP 7 8 10  --  10   MALICK 7.3* 7.5* 7.9*  --  8.2*   * 86 66*  --  110*   ALBUMIN  --   --   --   --  2.5*   PROTTOTAL  --   --   --   --  6.2*   BILITOTAL  --   --   --   --  0.3   ALKPHOS  --   --   --   --  180*   ALT  --   --   --   --  9   AST  --   --   --   --  14   LIPASE  --   --   --   --  124     Recent Labs   Lab Test  10/26/17   0748  10/26/17   0734  10/26/17   0436  10/26/17   0030  10/25/17   2019  10/25/17   1609   10/24/17   0739   10/23/17   0809   10/22/17   0305   10/16/17   0545   GLC   --   107*   --    --    --    --    --   86   --   66*   --   110*   --   102*   BGM  115*   --   127*  113*  103*  115*   < >   --    < >   --    < >   --    < >   --     < > = values in this interval not displayed.         No results found for this or any previous visit (from the past 24 hour(s)).    Medications   All medications were reviewed.    dextrose 5% and 0.45% NaCl 75 mL/hr at 10/26/17 0051     NaCl 10 mL/hr at 10/24/17 1352       fluticasone  1 spray Both Nostrils Daily     pantoprazole  40 mg Intravenous QAM AC     metoprolol  5 mg Intravenous Q6H     miconazole   Topical BID     amiodarone (PACERONE/CODARONE) tablet 200 mg  200 mg Oral BID     - MEDICATION INSTRUCTIONS for Dialysis Patients -   Does not apply See Admin Instructions     insulin aspart  1-6 Units Subcutaneous Q4H

## 2017-10-26 NOTE — PROGRESS NOTES
SW:    I: SW following for discharge planning. SW returned call to [facility] Yadi ([phone]), needed to leave message. Pt desires discharge to home with home care. SW to follow and assist as able, awaiting return of bowel function and advancement of diet.    P: SW to follow.    SHANTELL Vázquez, MaineGeneral Medical CenterSW  Daytime (8:00am-4:30pm): [phone]  After-Hours SW Pager (4:30pm-11:30pm): [phone]

## 2017-10-26 NOTE — PLAN OF CARE
Problem: Patient Care Overview  Goal: Plan of Care/Patient Progress Review  Pt is A&O X4. Very talkative. Pt is up with one and a GB and walker. No covdrage needed for BG. P t denies any pain and rested well last night. Pt has limited urine output due to dialysis. Pt has IVF infusing. Continue to monitor.

## 2017-10-26 NOTE — PLAN OF CARE
Problem: Patient Care Overview  Goal: Plan of Care/Patient Progress Review  OT: Eval completed and treatment initiated. Pt  presented from TCU 10/22 with abdominal pain and distension with signs of SBO on imaging, and underwent laparotomy and lysis adhesions 10/24. Pt lives in house with spouse, granddaughter, and 2 friends with no accessibility concerns. Pt reports I or Mod I with all ADLs, and shares IADL responsibilities with other people in house. Pt receives home health nursing which assists with medications, and is homebound so granddaughter provides transportation.  Discharge Planner OT   Patient plan for discharge: Home with family A and home OT/PT/RN  Current status: Educated pt and provided handouts on abdominal precautions and AE for dressing ad toileting. Pt is SBA for LB dressing using AE following education and verbal cues. Pt completes oral hygiene standing at sink CGA and cues for keeping FWW in front of her in BR. Pt completes toilet transfer with CGA, FWW, and BSC overlay over toilet with cues for hand placement.   Barriers to return to prior living situation: Decreased activity tolerance and post-surgical precautions  Recommendations for discharge: Home with family A and home OT/PT/RN  Rationale for recommendations: Family A with ADLs/IADLs to ensure safety as needed. Home OT for home safety eval.        Entered by: Myrna Andino 10/26/2017 3:39 PM

## 2017-10-26 NOTE — PROGRESS NOTES
10/26/17 1407   Quick Adds   Type of Visit Initial Occupational Therapy Evaluation   Living Environment   Lives With friend(s);spouse;grandchild(zoe)   Living Arrangements house   Home Accessibility no concerns;bed and bath on same level;grab bars present (toilet);grab bars present (bathtub)   Number of Stairs to Enter Home 0   Number of Stairs Within Home 0   Transportation Available car;family or friend will provide   Living Environment Comment Pt lives with spouse, grandchild, and 2 friends   Self-Care   Dominant Hand right   Usual Activity Tolerance good   Current Activity Tolerance moderate   Regular Exercise no   Equipment Currently Used at Home walker, rolling;walker, standard;other (see comments);dressing device;raised toilet;grab bar;shower chair  (Scooter, toilet safety frame, reacher)   Activity/Exercise/Self-Care Comment Pt uses walk-in shower with built-in bench and handheld spray. Pt dresses with sock aide and shoe horn.   Functional Level Prior   Ambulation 1-->assistive equipment   Transferring 1-->assistive equipment   Toileting 1-->assistive equipment   Bathing 1-->assistive equipment   Dressing 2-->assistive person  (Spouse occasionally A with socks/shoes, but pt able)   Eating 0-->independent   Communication 0-->understands/communicates without difficulty   Swallowing 0-->swallows foods/liquids without difficulty   Cognition 0 - no cognition issues reported   Fall history within last six months no   Which of the above functional risks had a recent onset or change? dressing;toileting   General Information   Onset of Illness/Injury or Date of Surgery - Date 10/22/17   Referring Physician Odin Santos MD   Patient/Family Goals Statement D/c home with family/friends A as needed   Additional Occupational Profile Info/Pertinent History of Current Problem Ms. Kathryn Banks is a 77-year-old female with history including CAD, HTN, diet controlled DM, afib/flutter, ESRD (started HD last  hospitalization), ANABEL, RLS, C. Diff, and recent hospitalizations, most recently at [facility] (10/2-10/18) for issues including pneumonia with septic shock and acute respiratory failure. She presented from TCU 10/22 with abdominal pain and distension with signs of SBO on imaging. Pt underrwent laparotomy with lysis adhesions 10/24.   Precautions/Limitations abdominal precautions;fall precautions   Cognitive Status Examination   Orientation orientation to person, place and time   Level of Consciousness alert   Able to Follow Commands WNL/WFL   Personal Safety (Cognitive) WNL/WFL   Memory intact   Attention No deficits were identified   Organization/Problem Solving No deficits were identified   Executive Function No deficits were identified   Visual Perception   Visual Perception Wears glasses  (for reading)   Sensory Examination   Sensory Comments No issues reported   Pain Assessment   Patient Currently in Pain No   Range of Motion (ROM)   ROM Comment WFL B UE   Strength   Strength Comments MMT 4/5 B UE   Bed Mobility Skill: Supine to Sit   Level of [place]: Supine/Sit independent   Transfer Skill: Bed to Chair/Chair to Bed   Level of [place]: Bed to Chair contact guard   Physical Assist/Nonphysical Assist: Bed to Chair 1 person assist   Assistive Device - Transfer Skill Bed to Chair Chair to Bed Rehab Eval standard walker   Transfer Skill: Sit to Stand   Level of [place]: Sit/Stand contact guard   Physical Assist/Nonphysical Assist: Sit/Stand 1 person assist;verbal cues   Assistive Device for Transfer: Sit/Stand standard walker   Transfer Skill: Toilet Transfer   Level of [place]: Toilet contact guard   Physical Assist/Nonphysical Assist: Toilet 1 person assist;verbal cues   Assistive Device grab bars;standard walker  (Commode with handles)   Lower Body Dressing   Level of [place]: Dress Lower Body stand-by assist   Physical Assist/Nonphysical Assist: Dress Lower Body verbal cues;1 person assist  "  Assistive Device sock-aid;dressing stick   Grooming   Level of [place]: Grooming contact guard   Physical Assist/Nonphysical Assist: Grooming 1 person assist;verbal cues   Assistive Device other (see comments)  (FWW for standing at sink)   Eating/Self Feeding   Level of [place]: Eating independent   Instrumental Activities of Daily Living (IADL)   Previous Responsibilities meal prep;housekeeping;laundry;shopping;yardwork;finances   IADL Comments States sharing IADL responsibities with other residents of house. States homebound due to receiving home therapy, so granddaughter provided transportation. [facility] home RN assists with medications.   Activities of Daily Living Analysis   Impairments Contributing to Impaired Activities of Daily Living balance impaired;strength decreased;post surgical precautions  (Decreased activity tolerance)   General Therapy Interventions   Planned Therapy Interventions ADL retraining;IADL retraining;transfer training   Clinical Impression   Criteria for Skilled Therapeutic Interventions Met yes, treatment indicated   OT Diagnosis Decreased independence with ADLs/IADLs and functional mobility   Influenced by the following impairments Balance impaired, post-surgical precautions, decreased strength and activity tolerance   Assessment of Occupational Performance 1-3 Performance Deficits   Identified Performance Deficits ADLs i.e. dressing, bathing, toileting   Clinical Decision Making (Complexity) Low complexity   Therapy Frequency daily   Predicted Duration of Therapy Intervention (days/wks) 3 days   Anticipated Equipment Needs at Discharge reacher;dressing stick   Anticipated Discharge Disposition Home with Assist;Home with Home Therapy  (home OT for home safety eval and family/friend A as needed)   Risks and Benefits of Treatment have been explained. Yes   Patient, Family & other staff in agreement with plan of care Yes   [facility] AM-PAC  \"6 Clicks\" Daily Activity " Inpatient Short Form   1. Putting on and taking off regular lower body clothing? 3 - A Little   2. Bathing (including washing, rinsing, drying)? 3 - A Little   3. Toileting, which includes using toilet, bedpan or urinal? 3 - A Little   4. Putting on and taking off regular upper body clothing? 4 - None   5. Taking care of personal grooming such as brushing teeth? 4 - None   6. Eating meals? 4 - None   Daily Activity Raw Score (Score out of 24.Lower scores equate to lower levels of function) 21   Total Evaluation Time   Total Evaluation Time (Minutes) 10

## 2017-10-27 ENCOUNTER — APPOINTMENT (OUTPATIENT)
Dept: PHYSICAL THERAPY | Facility: CLINIC | Age: 77
DRG: 329 | End: 2017-10-27
Payer: MEDICARE

## 2017-10-27 LAB
ANION GAP SERPL CALCULATED.3IONS-SCNC: 6 MMOL/L (ref 3–14)
BASOPHILS # BLD AUTO: 0.1 10E9/L (ref 0–0.2)
BASOPHILS NFR BLD AUTO: 0.8 %
BUN SERPL-MCNC: 8 MG/DL (ref 7–30)
CALCIUM SERPL-MCNC: 7.2 MG/DL (ref 8.5–10.1)
CHLORIDE SERPL-SCNC: 108 MMOL/L (ref 94–109)
CO2 SERPL-SCNC: 26 MMOL/L (ref 20–32)
CREAT SERPL-MCNC: 3 MG/DL (ref 0.52–1.04)
DIFFERENTIAL METHOD BLD: ABNORMAL
EOSINOPHIL # BLD AUTO: 0.4 10E9/L (ref 0–0.7)
EOSINOPHIL NFR BLD AUTO: 5.3 %
ERYTHROCYTE [DISTWIDTH] IN BLOOD BY AUTOMATED COUNT: 15.9 % (ref 10–15)
GFR SERPL CREATININE-BSD FRML MDRD: 15 ML/MIN/1.7M2
GLUCOSE BLDC GLUCOMTR-MCNC: 101 MG/DL (ref 70–99)
GLUCOSE BLDC GLUCOMTR-MCNC: 105 MG/DL (ref 70–99)
GLUCOSE BLDC GLUCOMTR-MCNC: 108 MG/DL (ref 70–99)
GLUCOSE BLDC GLUCOMTR-MCNC: 125 MG/DL (ref 70–99)
GLUCOSE BLDC GLUCOMTR-MCNC: 148 MG/DL (ref 70–99)
GLUCOSE SERPL-MCNC: 115 MG/DL (ref 70–99)
HCT VFR BLD AUTO: 30.2 % (ref 35–47)
HGB BLD-MCNC: 9.2 G/DL (ref 11.7–15.7)
IMM GRANULOCYTES # BLD: 0.1 10E9/L (ref 0–0.4)
IMM GRANULOCYTES NFR BLD: 1.7 %
LYMPHOCYTES # BLD AUTO: 0.6 10E9/L (ref 0.8–5.3)
LYMPHOCYTES NFR BLD AUTO: 8.4 %
MCH RBC QN AUTO: 27.6 PG (ref 26.5–33)
MCHC RBC AUTO-ENTMCNC: 30.5 G/DL (ref 31.5–36.5)
MCV RBC AUTO: 91 FL (ref 78–100)
MONOCYTES # BLD AUTO: 0.7 10E9/L (ref 0–1.3)
MONOCYTES NFR BLD AUTO: 11 %
NEUTROPHILS # BLD AUTO: 4.9 10E9/L (ref 1.6–8.3)
NEUTROPHILS NFR BLD AUTO: 72.8 %
NRBC # BLD AUTO: 0 10*3/UL
NRBC BLD AUTO-RTO: 0 /100
PLATELET # BLD AUTO: 320 10E9/L (ref 150–450)
POTASSIUM SERPL-SCNC: 3.4 MMOL/L (ref 3.4–5.3)
RBC # BLD AUTO: 3.33 10E12/L (ref 3.8–5.2)
SODIUM SERPL-SCNC: 140 MMOL/L (ref 133–144)
WBC # BLD AUTO: 6.7 10E9/L (ref 4–11)

## 2017-10-27 PROCEDURE — 25000128 H RX IP 250 OP 636: Performed by: SURGERY

## 2017-10-27 PROCEDURE — 25800025 ZZH RX 258: Performed by: INTERNAL MEDICINE

## 2017-10-27 PROCEDURE — 25000132 ZZH RX MED GY IP 250 OP 250 PS 637: Mod: GY | Performed by: INTERNAL MEDICINE

## 2017-10-27 PROCEDURE — 80048 BASIC METABOLIC PNL TOTAL CA: CPT | Performed by: INTERNAL MEDICINE

## 2017-10-27 PROCEDURE — A9270 NON-COVERED ITEM OR SERVICE: HCPCS | Mod: GY | Performed by: INTERNAL MEDICINE

## 2017-10-27 PROCEDURE — 00000146 ZZHCL STATISTIC GLUCOSE BY METER IP

## 2017-10-27 PROCEDURE — 63400005 ZZH RX 634: Performed by: INTERNAL MEDICINE

## 2017-10-27 PROCEDURE — 25000128 H RX IP 250 OP 636: Performed by: INTERNAL MEDICINE

## 2017-10-27 PROCEDURE — 12000007 ZZH R&B INTERMEDIATE

## 2017-10-27 PROCEDURE — 97116 GAIT TRAINING THERAPY: CPT | Mod: GP

## 2017-10-27 PROCEDURE — 25000125 ZZHC RX 250: Performed by: INTERNAL MEDICINE

## 2017-10-27 PROCEDURE — S5010 5% DEXTROSE AND 0.45% SALINE: HCPCS | Performed by: INTERNAL MEDICINE

## 2017-10-27 PROCEDURE — 36415 COLL VENOUS BLD VENIPUNCTURE: CPT | Performed by: INTERNAL MEDICINE

## 2017-10-27 PROCEDURE — 99232 SBSQ HOSP IP/OBS MODERATE 35: CPT | Performed by: INTERNAL MEDICINE

## 2017-10-27 PROCEDURE — 85025 COMPLETE CBC W/AUTO DIFF WBC: CPT | Performed by: INTERNAL MEDICINE

## 2017-10-27 PROCEDURE — 40000193 ZZH STATISTIC PT WARD VISIT

## 2017-10-27 PROCEDURE — 90937 HEMODIALYSIS REPEATED EVAL: CPT

## 2017-10-27 PROCEDURE — 97110 THERAPEUTIC EXERCISES: CPT | Mod: GP

## 2017-10-27 RX ORDER — PRAMIPEXOLE DIHYDROCHLORIDE 0.12 MG/1
0.12 TABLET ORAL 3 TIMES DAILY
Status: DISCONTINUED | OUTPATIENT
Start: 2017-10-27 | End: 2017-11-01 | Stop reason: HOSPADM

## 2017-10-27 RX ORDER — METOCLOPRAMIDE HYDROCHLORIDE 5 MG/ML
5 INJECTION INTRAMUSCULAR; INTRAVENOUS EVERY 8 HOURS
Status: DISCONTINUED | OUTPATIENT
Start: 2017-10-27 | End: 2017-11-01 | Stop reason: HOSPADM

## 2017-10-27 RX ADMIN — SODIUM CHLORIDE 250 ML: 9 INJECTION, SOLUTION INTRAVENOUS at 13:33

## 2017-10-27 RX ADMIN — DEXTROSE AND SODIUM CHLORIDE: 5; 450 INJECTION, SOLUTION INTRAVENOUS at 00:04

## 2017-10-27 RX ADMIN — LORAZEPAM 0.25 MG: 2 INJECTION INTRAMUSCULAR; INTRAVENOUS at 12:52

## 2017-10-27 RX ADMIN — INSULIN ASPART 1 UNITS: 100 INJECTION, SOLUTION INTRAVENOUS; SUBCUTANEOUS at 16:40

## 2017-10-27 RX ADMIN — FLUTICASONE PROPIONATE 1 SPRAY: 50 SPRAY, METERED NASAL at 08:19

## 2017-10-27 RX ADMIN — METOCLOPRAMIDE 5 MG: 5 INJECTION, SOLUTION INTRAMUSCULAR; INTRAVENOUS at 08:24

## 2017-10-27 RX ADMIN — AMIODARONE HYDROCHLORIDE 200 MG: 200 TABLET ORAL at 08:24

## 2017-10-27 RX ADMIN — METOPROLOL TARTRATE 5 MG: 5 INJECTION INTRAVENOUS at 00:04

## 2017-10-27 RX ADMIN — METOCLOPRAMIDE 5 MG: 5 INJECTION, SOLUTION INTRAMUSCULAR; INTRAVENOUS at 16:38

## 2017-10-27 RX ADMIN — HEPARIN SODIUM 1400 UNITS: 1000 INJECTION, SOLUTION INTRAVENOUS; SUBCUTANEOUS at 13:35

## 2017-10-27 RX ADMIN — HEPARIN SODIUM 1400 UNITS: 1000 INJECTION, SOLUTION INTRAVENOUS; SUBCUTANEOUS at 13:37

## 2017-10-27 RX ADMIN — SODIUM CHLORIDE 500 ML: 9 INJECTION, SOLUTION INTRAVENOUS at 13:34

## 2017-10-27 RX ADMIN — METOPROLOL TARTRATE 5 MG: 5 INJECTION INTRAVENOUS at 06:36

## 2017-10-27 RX ADMIN — METOPROLOL TARTRATE 5 MG: 5 INJECTION INTRAVENOUS at 16:37

## 2017-10-27 RX ADMIN — AMIODARONE HYDROCHLORIDE 200 MG: 200 TABLET ORAL at 21:13

## 2017-10-27 RX ADMIN — EPOETIN ALFA 4000 UNITS: 4000 SOLUTION INTRAVENOUS; SUBCUTANEOUS at 13:34

## 2017-10-27 RX ADMIN — PANTOPRAZOLE SODIUM 40 MG: 40 INJECTION, POWDER, FOR SOLUTION INTRAVENOUS at 06:36

## 2017-10-27 RX ADMIN — DEXTROSE AND SODIUM CHLORIDE: 5; 450 INJECTION, SOLUTION INTRAVENOUS at 12:52

## 2017-10-27 NOTE — PLAN OF CARE
Problem: Patient Care Overview  Goal: Plan of Care/Patient Progress Review  Outcome: Improving  VSS. A&O.  Up with 1, GB, and walker.  Tolerating sips of water, tea and broth.  Cee pulled, due to void.  Denies pain.  Hypoactive BS.  LS clear. PIV  patent and CVC c/d/i.  Dialysis tomorrow.

## 2017-10-27 NOTE — PLAN OF CARE
Problem: Patient Care Overview  Goal: Plan of Care/Patient Progress Review  OT: Attempted to see pt, but pt just beginning dyalysis.

## 2017-10-27 NOTE — PROGRESS NOTES
Inpatient Dialysis Progress Note            Assessment and Plan:     Admitted with concern for SBO      Seen regarding management of ESRD      1.  ESRD - Stable run.  Weight is up some but may still be third spaced.  Tolerating UF of 1.5 kg.    2. SBO:  S/P expl lap.  Waiting for return of bowel function.    3. Anemia: HGB 9.2.  On EPO.  4000 units.      4. HTN:  BP is a little higher.  UF 1.5 kg today.                           Interval History:   Taking sips of clears.  No flatus.  Still on IV fluid - NS 75 mL per hour.        Dialysis Parameters:     Wt Readings from Last 4 Encounters:   10/27/17 68.6 kg (151 lb 3.8 oz)   10/18/17 62.2 kg (137 lb 2 oz)   10/01/17 77.3 kg (170 lb 8 oz)   09/22/17 77.1 kg (170 lb)     I/O last 3 completed shifts:  In: 2818 [P.O.:500; I.V.:2318]  Out: 100 [Urine:100]  BP Readings from Last 3 Encounters:   10/27/17 160/84   10/20/17 117/64   10/18/17 112/66       Routine, ONE TIME, Starting today For 1 Occurrences  Weight Loss (kg): 1.5  Dialysis Temp: 36.5  C  Access Device: CVC  Access Site: [facility]  Dialyzer: Revaclear  Dialysis Bath: K 4  Blood Flow Rate (mL/min): 400  Total Treatment Time (hrs): 3  Heparin: no         Medications and Allergies:   Reviewed in EPIC      metoclopramide  5 mg Intravenous Q8H     fluticasone  1 spray Both Nostrils Daily     pantoprazole  40 mg Intravenous QAM AC     metoprolol  5 mg Intravenous Q6H     miconazole   Topical BID     amiodarone (PACERONE/CODARONE) tablet 200 mg  200 mg Oral BID     - MEDICATION INSTRUCTIONS for Dialysis Patients -   Does not apply See Admin Instructions     insulin aspart  1-6 Units Subcutaneous Q4H     sodium chloride 0.9%, - MEDICATION INSTRUCTIONS -, melatonin, lidocaine (buffered or not buffered), HYDROmorphone, sore throat lozenge, LORazepam, ipratropium - albuterol 0.5 mg/2.5 mg/3 mL, nitroGLYcerin, naloxone, HYDROmorphone, bisacodyl, ondansetron **OR** ondansetron, prochlorperazine **OR** prochlorperazine **OR**  prochlorperazine, HOLD MEDICATION, hypromellose-dextran, glucose **OR** dextrose **OR** glucagon     Allergies   Allergen Reactions     Ciprofloxacin Nausea and Vomiting     Zofran did not help     Oxycodone Visual Disturbance     Delusions, blackouts      Lisinopril Cough     Sulfa Drugs GI Disturbance     LOSS OF TASTE     Penicillins Other (See Comments)     Swelling and reddness at injection site, pt wanted it on               Labs:     BMP  Recent Labs  Lab 10/27/17  1000 10/26/17  0734 10/24/17  0739 10/23/17  0809    139 142 138   POTASSIUM 3.4 3.4 3.8 4.1   CHLORIDE 108 103 105 100   MALICK 7.2* 7.3* 7.5* 7.9*   CO2 26 29 29 28   BUN 8 7 9 30   CR 3.00* 2.22* 2.69* 4.96*   * 107* 86 66*     CBC  Recent Labs  Lab 10/27/17  1000 10/26/17  0734 10/24/17  0739 10/23/17  0809   WBC 6.7 5.9 3.6* 4.7   HGB 9.2* 8.9* 9.1* 9.4*   HCT 30.2* 29.4* 30.0* 30.7*   MCV 91 90 91 91    310 311 324     Lab Results   Component Value Date    AST 14 10/22/2017    ALT 9 10/22/2017    GGT 18 12/02/2004    ALKPHOS 180 (H) 10/22/2017    BILITOTAL 0.3 10/22/2017    BILICONJ 0.0 07/05/2007            Physical Exam:   Vitals were reviewed in [facility]    Wt Readings from Last 3 Encounters:   10/27/17 68.6 kg (151 lb 3.8 oz)   10/18/17 62.2 kg (137 lb 2 oz)   10/01/17 77.3 kg (170 lb 8 oz)       Intake/Output Summary (Last 24 hours) at 10/27/17 1356  Last data filed at 10/27/17 0600   Gross per 24 hour   Intake             1000 ml   Output                0 ml   Net             1000 ml       GENERAL APPEARANCE: pleasant, no distress, a & o  HEENT:  Eyes/ears/nose grossly normal, neck supple  RESP: lungs clear to auscultation with good efforts, no crackles, rhonchi or wheezes  CV: regular rate and rhythm, normal S1 S2, no murmur, click or rub   ABDOMEN: soft, nontender, bowel sounds reduced  EXTREMITIES/SKIN: tr edema     Pt seen on dialysis.  Stable run.  Good BFR.      Attestation:  I have reviewed today's vital signs,  notes, medications, labs and imaging.     Micheal Rich MD  [facility] - Nephrology  [phone]

## 2017-10-27 NOTE — PLAN OF CARE
Problem: Patient Care Overview  Goal: Plan of Care/Patient Progress Review  PT: Discharge Planner PT   Patient plan for discharge: This morning she was open to possibly going to TCU, but would prefer to go home.                      Current status: Pt was very fatigued this morning and demonstrated dec strength and activity tolerance. She was only able to ambulate a short distance of 30 ft before needing to sit and requested w/c ride back to room. Required min A 1 to rise from w/c to transfer to chair. She was able to complete all seated exercises with increased fatigued compared to yesterday.             Barriers to return to prior living situation: dec strength, endurance, activity tolerance  Recommendations for discharge: TCU       Rationale for recommendations: She would continue to benefit from skilled therapy to improve strength, endurance, activity tolerance to ensure a safe return home.       Entered by: Zo Mcclendon 10/27/2017 9:02 AM

## 2017-10-27 NOTE — PLAN OF CARE
Problem: Patient Care Overview  Goal: Plan of Care/Patient Progress Review  Outcome: No Change  A&Ox4. VSS on 1L n/c. Incisions CDI, CMS intact. Hypoactive BS. Due to void, bladder scanned for 63 mL.Denies flatus. Up with 1, walker, and GB. Continue to monitor.

## 2017-10-27 NOTE — PLAN OF CARE
Problem: Patient Care Overview  Goal: Plan of Care/Patient Progress Review  Outcome: No Change  Pt went to dialysis this afternoon.  VSS.  Up with 1-2 and gait belt to wheelchair.  Denies pain.  Abd distended/firm.  Hypoactive BS, no flatus, positive belching.  No nausea.  Incision CD&I with steristrips.  No void this shift.

## 2017-10-27 NOTE — PLAN OF CARE
Problem: Patient Care Overview  Goal: Plan of Care/Patient Progress Review  Outcome: No Change  VSS. A&Ox4.  Up with 1, GB, and walker.  Ambulated in the harmon.  Tolerating sips of water, tea and broth.  Cee pulled, due to void but produces minimal urine and  Is on hemodilaysis.  Denies pain.  Hypoactive BS.  LS clear. IVF into sloc and CVC c/d/i.  Dialysis tomorrow.

## 2017-10-27 NOTE — TELEPHONE ENCOUNTER
Still admitted.    Call date: 11/3    Yesy Samaniego, PharmD, [facility]  [phone]  October 27, 2017

## 2017-10-27 NOTE — PROGRESS NOTES
Potassium   Date Value Ref Range Status   10/27/2017 3.4 3.4 - 5.3 mmol/L Final   ]  Lab Results   Component Value Date    HGB 9.2 10/27/2017     Weight: 68.6 kg (151 lb 3.8 oz)  POST WT 67.1 kg    DIALYSIS PROCEDURE NOTE    Hepatitis status of previous patient on machine log was checked and ok to use with this patient hepatitis status.  Patient dialyzed for 3 hrs on a 4 K bath with a  net fluid removal of 1.5 L.  A BFR of 400 ml/min was obtained via RIJ.  Patient was seen by  during treatment. Total heparin received during treatment:: 0units.  Heparin instilled in both ports post run.      Meds given: Epogen IV.   Complications: none.      Procedure and ESRD teaching done and questions answered.  See flowsheet in EPIC for further details and post assessment. Machine water alarm in place and functioning.  HEPATITIS B SURFACE ANTIGEN non-reactive Date 10/3/17 HEPATITIS B SURFACE ANTIBODY immune DATE 10/3/17  CHLORINE AND CHLORAMINES CHECK on WATER SYSTEM EVERY 4 hours.   PT returned via w/c a/o x4  Catheter Access: Aseptic prep done for both on/off. Dressing CDI. Adequate BFR.  Report received from: Crystal Aguiar RN  Report given to: Crystal Aguiar RN  Outpatient Dialysis at [facility]    Naveed Tompkins RN

## 2017-10-27 NOTE — PROGRESS NOTES
[facility]    Internal Medicine Hospitalist Progress Note  10/27/2017  I evaluated patient on the above date.    Juanjose Frost Jr., MD  [phone] (p)  Text Page (7 am to 6 pm)      Assessment & Plan   Ms. Kathryn Banks is a 77-year-old female with history including CAD, HTN, diet controlled DM, afib/flutter, ESRD (started HD last hospitalization), ANABEL, RLS, C. Diff, and recent hospitalizations, most recently at [facility] (10/2-10/18) for issues including pneumonia with septic shock and acute respiratory failure. She presented from TCU 10/22 with abdominal pain and distension with signs of SBO on imaging.    Distal ileal bowel obstruction with focal perforation due to adhesive bands -  s/p ex lap, ALISSA, repair of perforation 10/24/2017.   CT 10/22 showed closed-loop SBO. NG placed 10/22. Seen by Surgery and NG clamped 10/23 and seemed to tolerate, but more bloated and no flatus or BM thereafter. Underwent above surgery on 10/24. NG d/c'ed 10/25. Diet to clears 10/26 but more bloating and NPO again 10/27.  - Post-op management per Surgery.  - Appreciate help from Dr. Zafar.    FEN.  - Continue D5 1/2 NS at 75 ml/hr.    Recent hospitalization with pneumonia with acute respiratory failure.  Finished antibiotics PTA.  - Monitor clinically.  - Continue PRN Duonebs.    Atrial fibrillation/flutter.  H/o atrial flutter ablation 6/2017. Also h/o cardioversion. Issues with afib with RVR last hospitalization (10/2-10/18); started on amiodarone by Cardiology last stay. Started scheduled IV metoprolol this stay.  - Continue amiodarone as able.  - Continue metoprolol 5 mg IV q6h.  - Holding PTA warfarin for now, resume when OK with Surgery.    ESRD on HD.  H/o CKD and was already planning for HD before last hospitalization. Last hospitalization was initiated on HD. Pt asked Dr. Frost to call pt's Nephrologist, Dr. Vibha Barfield ([phone]) on 10/27; Dr. Frost called Dr. Barfield's clinic 10/27 but she was unavailable;  he discussed with an associate of Dr. Barfield and gave an update and Dr. Barfield's associate said that they would call us if any questions or concerns.  - Continue HD qMWF per Nephrology.  - Appreciate help from Dr. Rich.     CAD.  Prior CABG 2007. Angiogram 4/2010 showed patent grafts with the exception of VG to RCA which was 100% occluded.  - Continue scheduled IV metoprolol.  - Resume pravastatin as able.    Benign essential HTN.  Diastolic CHF.  Last echo 10/3/2017 showed LVEF 60-65%. Prior to last admission was on amlodipine, metoprolol and torsemide. Issues with septic shock last stay as well as ARF on CKD and starting dialysis. Not on any BP meds or diuretics prior to this admission 10/22; stable BP's after admission on IV metoprolol. BP's slightly elevated 10/25 may be related to pain/stress.  - Continue scheduled IV metoprolol 5 mg q6h.  - Consider IV enalaprilat.  - Monitor i/o's, daily wts.    DM2, diet controlled.  Hgb A1C 6.6 10/13/2017.  Recent Labs   Lab Test  10/27/17   1200  10/27/17   1000  10/27/17   0812  10/27/17   0402  10/27/17   0010  10/26/17   2014   10/26/17   0734   10/24/17   0739   10/23/17   0809   10/22/17   0305   GLC   --   115*   --    --    --    --    --   107*   --   86   --   66*   --   110*   BGM  108*   --   125*  101*  105*  118*   < >   --    < >   --    < >   --    < >   --     < > = values in this interval not displayed.   - NPO now.  - Continue ISS.    ANABEL.  - Continue CPAP.    RLS.  - Resume PTA pramipexole as able.    Anemia, suspect chronic component.  No overt clinical signs of bleeding.     Recent Labs  Lab 10/27/17  1000 10/26/17  0734 10/24/17  0739 10/23/17  0809 10/22/17  0305   HGB 9.2* 8.9* 9.1* 9.4* 10.4*   - Continue PTA Aranesp per Nephrology.  - Monitor CBC periodically as needed.  - Consider prbc transfusion if hgb <8.0 or if significant bleeding with hemodynamic instability or if symptomatic.    ?PAD.  LACI studies 8/2017 showed abnormal LACI 0.64 L  "first digit; otherwise LACI normal bilaterally. PTA on Trental, though pt denies any claudication type symptoms.  - Resume Trental as able.    Deconditioning.  - Continue PT and OT.    Prophylaxis.  - PCD's, ambulation.  - IV Protonix.    CODE STATUS: FULL.    Dispo.  - Pending above; await return of bowel function.  - 1-2 more days at least.    Interval History   Had bloating after trying some liquids. Made NPO again. No BP yet. No N/V.    -Data reviewed today: I reviewed all new labs and imaging over the last 24 hours. I personally reviewed no images or EKG's today.    Physical Exam   Heart Rate: 80, Blood pressure 136/68, pulse 63, temperature 98.7  F (37.1  C), temperature source Oral, resp. rate 16, height 1.626 m (5' 4\"), weight 68.6 kg (151 lb 3.8 oz), SpO2 92 %, not currently breastfeeding.  Vitals:    10/23/17 1330 10/25/17 0600 10/27/17 1213   Weight: 65.8 kg (145 lb 1 oz) 68.6 kg (151 lb 3.8 oz) 68.6 kg (151 lb 3.8 oz)     Vital Signs with Ranges  Temp:  [97.9  F (36.6  C)-98.7  F (37.1  C)] 98.7  F (37.1  C)  Pulse:  [63-76] 63  Heart Rate:  [71-80] 80  Resp:  [16] 16  BP: (110-136)/(61-69) 136/68  SpO2:  [92 %-95 %] 92 %  Patient Vitals for the past 24 hrs:   BP Temp Temp src Pulse Heart Rate Resp SpO2 Weight   10/27/17 1213 - - - - - - - 68.6 kg (151 lb 3.8 oz)   10/27/17 1123 136/68 98.7  F (37.1  C) Oral 63 - 16 92 % -   10/27/17 0817 - - - - - - 93 % -   10/27/17 0748 134/69 98.7  F (37.1  C) Oral 76 - 16 93 % -   10/26/17 2300 126/65 98.3  F (36.8  C) Oral - 80 16 94 % -   10/26/17 1912 129/63 98.1  F (36.7  C) Oral 76 76 16 92 % -   10/26/17 1600 - - - - - 16 - -   10/26/17 1527 129/69 97.9  F (36.6  C) Oral 72 71 16 95 % -   10/26/17 1300 110/61 - - - 75 16 - -   10/26/17 1230 - - - - - 16 - -     I/O's Last 24 hours  I/O last 3 completed shifts:  In: 2818 [P.O.:500; I.V.:2318]  Out: 100 [Urine:100]    Constitutional: Alert, oriented, pleasant.  Respiratory: Diminished in bases. No crackles or " wheezes.  Cardiovascular: RRR no m/r/g.  GI: Some distension, +BS, nt to light touch.  Skin/Integumen:   Other:        Data     Recent Labs  Lab 10/27/17  1000 10/26/17  0734 10/24/17  0739 10/23/17  0809 10/22/17  2115 10/22/17  0305   WBC 6.7 5.9 3.6* 4.7  --  7.4   HGB 9.2* 8.9* 9.1* 9.4*  --  10.4*   MCV 91 90 91 91  --  88    310 311 324  --  357   INR  --   --   --  1.16* 1.45* 3.19*    139 142 138  --  136   POTASSIUM 3.4 3.4 3.8 4.1  --  4.1   CHLORIDE 108 103 105 100  --  96   CO2 26 29 29 28  --  30   BUN 8 7 9 30  --  21   CR 3.00* 2.22* 2.69* 4.96*  --  3.91*   ANIONGAP 6 7 8 10  --  10   MALICK 7.2* 7.3* 7.5* 7.9*  --  8.2*   * 107* 86 66*  --  110*   ALBUMIN  --   --   --   --   --  2.5*   PROTTOTAL  --   --   --   --   --  6.2*   BILITOTAL  --   --   --   --   --  0.3   ALKPHOS  --   --   --   --   --  180*   ALT  --   --   --   --   --  9   AST  --   --   --   --   --  14   LIPASE  --   --   --   --   --  124     Recent Labs   Lab Test  10/27/17   1200  10/27/17   1000  10/27/17   0812  10/27/17   0402  10/27/17   0010  10/26/17   2014   10/26/17   0734   10/24/17   0739   10/23/17   0809   10/22/17   0305   GLC   --   115*   --    --    --    --    --   107*   --   86   --   66*   --   110*   BGM  108*   --   125*  101*  105*  118*   < >   --    < >   --    < >   --    < >   --     < > = values in this interval not displayed.         No results found for this or any previous visit (from the past 24 hour(s)).    Medications   All medications were reviewed.    - MEDICATION INSTRUCTIONS -       dextrose 5% and 0.45% NaCl 75 mL/hr at 10/27/17 0004     NaCl 10 mL/hr at 10/24/17 1352       sodium chloride 0.9%  250 mL Hemodialysis Machine Once     sodium chloride 0.9%  250-1,000 mL Intravenous Once in dialysis     epoetin hira (EPOGEN,PROCRIT) inj ESRD  4,000 Units Intravenous Once     metoclopramide  5 mg Intravenous Q8H     fluticasone  1 spray Both Nostrils Daily     pantoprazole  40  mg Intravenous QAM AC     metoprolol  5 mg Intravenous Q6H     miconazole   Topical BID     amiodarone (PACERONE/CODARONE) tablet 200 mg  200 mg Oral BID     - MEDICATION INSTRUCTIONS for Dialysis Patients -   Does not apply See Admin Instructions     insulin aspart  1-6 Units Subcutaneous Q4H

## 2017-10-27 NOTE — PROGRESS NOTES
[facility]    General Surgery  Daily Post-Op Note       Assessment and Plan:   Kathryn Banks is a 77 year old female S/P Procedure(s):  COMBINED LAPAROTOMY, LYSIS ADHESIONS, for SBO 3 Days Post-Op. Still no solid evidence of bowel function. Has been tolerating sips of clears without nausea but slightly more distended this am. Advised to hold off on any significant further oral intake until this resolves.    Pain: Minimal pain, roxicodone if needed.  Bowel: Awaiting ROBF.  Diet: NPO, only small sips, please no full cups of liquid at bedside.  IVFs: Continue. Dialysis today.  Activity: Frequent OOB  Antibiotics: None currently indicated.  DVT prophylaxis: Mechanical and frequent ambulation.   Dispo: several days until bowel function returns and diet resumed        Interval History:   Quiet night, comfortable, no flatus.         Physical Exam:   Temp: 98.3  F (36.8  C) Temp src: Oral BP: 126/65 Pulse: 76 Heart Rate: 80 Resp: 16 SpO2: 94 % O2 Device: None (Room air) Oxygen Delivery: 2 LPM    I/O last 3 completed shifts:  In: 2818 [P.O.:500; I.V.:2318]  Out: 100 [Urine:100]      Constitutional: healthy, alert and no distress   Cardiovascular: Regular rate.  Respiratory: RA easily.  Abdomen: Soft, nontender, slightly more distended this am. Incision well healing.    Odin Santos MD  General Surgery PGY4  [phone]        STAFF:  No pain.  Active BS but no flatus.  Very frustrated due to lack of bowel function.                 Walking frequently.  Will try Reglan.                                Rashard Zafar MD

## 2017-10-28 ENCOUNTER — APPOINTMENT (OUTPATIENT)
Dept: PHYSICAL THERAPY | Facility: CLINIC | Age: 77
DRG: 329 | End: 2017-10-28
Payer: MEDICARE

## 2017-10-28 ENCOUNTER — APPOINTMENT (OUTPATIENT)
Dept: OCCUPATIONAL THERAPY | Facility: CLINIC | Age: 77
DRG: 329 | End: 2017-10-28
Payer: MEDICARE

## 2017-10-28 PROBLEM — K56.7 POSTOPERATIVE ILEUS (H): Status: ACTIVE | Noted: 2017-10-28

## 2017-10-28 PROBLEM — D64.9 ANEMIA: Status: RESOLVED | Noted: 2017-07-19 | Resolved: 2017-10-28

## 2017-10-28 PROBLEM — Z99.2 ESRD (END STAGE RENAL DISEASE) ON DIALYSIS (H): Status: RESOLVED | Noted: 2017-10-20 | Resolved: 2017-10-28

## 2017-10-28 PROBLEM — E11.9 TYPE 2 DIABETES MELLITUS (H): Status: ACTIVE | Noted: 2017-10-20

## 2017-10-28 PROBLEM — N18.6 ESRD (END STAGE RENAL DISEASE) ON DIALYSIS (H): Status: RESOLVED | Noted: 2017-10-20 | Resolved: 2017-10-28

## 2017-10-28 PROBLEM — D63.1 ANEMIA IN STAGE 5 CHRONIC KIDNEY DISEASE (H): Status: RESOLVED | Noted: 2017-09-20 | Resolved: 2017-10-28

## 2017-10-28 PROBLEM — R60.1 GENERALIZED EDEMA: Status: RESOLVED | Noted: 2017-06-01 | Resolved: 2017-10-28

## 2017-10-28 PROBLEM — K91.89 POSTOPERATIVE ILEUS (H): Status: ACTIVE | Noted: 2017-10-28

## 2017-10-28 PROBLEM — N18.5 ANEMIA IN STAGE 5 CHRONIC KIDNEY DISEASE (H): Status: RESOLVED | Noted: 2017-09-20 | Resolved: 2017-10-28

## 2017-10-28 PROBLEM — E11.9 TYPE 2 DIABETES MELLITUS (H): Status: RESOLVED | Noted: 2017-10-20 | Resolved: 2017-10-28

## 2017-10-28 PROBLEM — I48.91 ATRIAL FIBRILLATION WITH RAPID VENTRICULAR RESPONSE (H): Status: RESOLVED | Noted: 2017-04-28 | Resolved: 2017-10-28

## 2017-10-28 PROBLEM — N18.5 CKD (CHRONIC KIDNEY DISEASE) STAGE 5, GFR LESS THAN 15 ML/MIN (H): Status: RESOLVED | Noted: 2017-09-19 | Resolved: 2017-10-28

## 2017-10-28 LAB
ANION GAP SERPL CALCULATED.3IONS-SCNC: 6 MMOL/L (ref 3–14)
BUN SERPL-MCNC: 4 MG/DL (ref 7–30)
CALCIUM SERPL-MCNC: 7.5 MG/DL (ref 8.5–10.1)
CHLORIDE SERPL-SCNC: 106 MMOL/L (ref 94–109)
CO2 SERPL-SCNC: 29 MMOL/L (ref 20–32)
CREAT SERPL-MCNC: 2.15 MG/DL (ref 0.52–1.04)
GFR SERPL CREATININE-BSD FRML MDRD: 22 ML/MIN/1.7M2
GLUCOSE BLDC GLUCOMTR-MCNC: 104 MG/DL (ref 70–99)
GLUCOSE BLDC GLUCOMTR-MCNC: 111 MG/DL (ref 70–99)
GLUCOSE BLDC GLUCOMTR-MCNC: 126 MG/DL (ref 70–99)
GLUCOSE BLDC GLUCOMTR-MCNC: 130 MG/DL (ref 70–99)
GLUCOSE BLDC GLUCOMTR-MCNC: 130 MG/DL (ref 70–99)
GLUCOSE BLDC GLUCOMTR-MCNC: 95 MG/DL (ref 70–99)
GLUCOSE SERPL-MCNC: 115 MG/DL (ref 70–99)
POTASSIUM SERPL-SCNC: 3.6 MMOL/L (ref 3.4–5.3)
SODIUM SERPL-SCNC: 141 MMOL/L (ref 133–144)

## 2017-10-28 PROCEDURE — A9270 NON-COVERED ITEM OR SERVICE: HCPCS | Mod: GY | Performed by: SURGERY

## 2017-10-28 PROCEDURE — 25000132 ZZH RX MED GY IP 250 OP 250 PS 637: Mod: GY | Performed by: SURGERY

## 2017-10-28 PROCEDURE — 25800025 ZZH RX 258: Performed by: INTERNAL MEDICINE

## 2017-10-28 PROCEDURE — 25000132 ZZH RX MED GY IP 250 OP 250 PS 637: Mod: GY | Performed by: INTERNAL MEDICINE

## 2017-10-28 PROCEDURE — 40000193 ZZH STATISTIC PT WARD VISIT

## 2017-10-28 PROCEDURE — 97530 THERAPEUTIC ACTIVITIES: CPT | Mod: GO

## 2017-10-28 PROCEDURE — S5010 5% DEXTROSE AND 0.45% SALINE: HCPCS | Performed by: INTERNAL MEDICINE

## 2017-10-28 PROCEDURE — 97116 GAIT TRAINING THERAPY: CPT | Mod: GP

## 2017-10-28 PROCEDURE — 99207 ZZC CDG-MDM COMPONENT: MEETS MODERATE - UP CODED: CPT | Performed by: INTERNAL MEDICINE

## 2017-10-28 PROCEDURE — 97530 THERAPEUTIC ACTIVITIES: CPT | Mod: GP

## 2017-10-28 PROCEDURE — 25000125 ZZHC RX 250: Performed by: INTERNAL MEDICINE

## 2017-10-28 PROCEDURE — 36415 COLL VENOUS BLD VENIPUNCTURE: CPT | Performed by: INTERNAL MEDICINE

## 2017-10-28 PROCEDURE — 40000133 ZZH STATISTIC OT WARD VISIT

## 2017-10-28 PROCEDURE — 00000146 ZZHCL STATISTIC GLUCOSE BY METER IP

## 2017-10-28 PROCEDURE — 25000128 H RX IP 250 OP 636: Performed by: SURGERY

## 2017-10-28 PROCEDURE — 12000007 ZZH R&B INTERMEDIATE

## 2017-10-28 PROCEDURE — 97110 THERAPEUTIC EXERCISES: CPT | Mod: GO

## 2017-10-28 PROCEDURE — 97110 THERAPEUTIC EXERCISES: CPT | Mod: GP

## 2017-10-28 PROCEDURE — 99233 SBSQ HOSP IP/OBS HIGH 50: CPT | Performed by: INTERNAL MEDICINE

## 2017-10-28 PROCEDURE — A9270 NON-COVERED ITEM OR SERVICE: HCPCS | Mod: GY | Performed by: INTERNAL MEDICINE

## 2017-10-28 PROCEDURE — 80048 BASIC METABOLIC PNL TOTAL CA: CPT | Performed by: INTERNAL MEDICINE

## 2017-10-28 RX ORDER — BISACODYL 10 MG
10 SUPPOSITORY, RECTAL RECTAL DAILY PRN
Status: DISCONTINUED | OUTPATIENT
Start: 2017-10-28 | End: 2017-11-01 | Stop reason: HOSPADM

## 2017-10-28 RX ADMIN — METOPROLOL TARTRATE 5 MG: 5 INJECTION INTRAVENOUS at 22:21

## 2017-10-28 RX ADMIN — METOPROLOL TARTRATE 5 MG: 5 INJECTION INTRAVENOUS at 00:25

## 2017-10-28 RX ADMIN — PRAMIPEXOLE DIHYDROCHLORIDE 0.12 MG: 0.12 TABLET ORAL at 22:21

## 2017-10-28 RX ADMIN — PRAMIPEXOLE DIHYDROCHLORIDE 0.12 MG: 0.12 TABLET ORAL at 09:27

## 2017-10-28 RX ADMIN — METOPROLOL TARTRATE 5 MG: 5 INJECTION INTRAVENOUS at 10:48

## 2017-10-28 RX ADMIN — PANTOPRAZOLE SODIUM 40 MG: 40 INJECTION, POWDER, FOR SOLUTION INTRAVENOUS at 06:31

## 2017-10-28 RX ADMIN — PRAMIPEXOLE DIHYDROCHLORIDE 0.12 MG: 0.12 TABLET ORAL at 15:35

## 2017-10-28 RX ADMIN — METOCLOPRAMIDE 5 MG: 5 INJECTION, SOLUTION INTRAMUSCULAR; INTRAVENOUS at 08:03

## 2017-10-28 RX ADMIN — BISACODYL 10 MG: 10 SUPPOSITORY RECTAL at 10:48

## 2017-10-28 RX ADMIN — MICONAZOLE NITRATE: 2 POWDER TOPICAL at 21:01

## 2017-10-28 RX ADMIN — PRAMIPEXOLE DIHYDROCHLORIDE 0.12 MG: 0.12 TABLET ORAL at 00:23

## 2017-10-28 RX ADMIN — AMIODARONE HYDROCHLORIDE 200 MG: 200 TABLET ORAL at 09:27

## 2017-10-28 RX ADMIN — Medication 5 MG: at 22:22

## 2017-10-28 RX ADMIN — METOPROLOL TARTRATE 5 MG: 5 INJECTION INTRAVENOUS at 17:25

## 2017-10-28 RX ADMIN — METOCLOPRAMIDE 5 MG: 5 INJECTION, SOLUTION INTRAMUSCULAR; INTRAVENOUS at 15:34

## 2017-10-28 RX ADMIN — METOCLOPRAMIDE 5 MG: 5 INJECTION, SOLUTION INTRAMUSCULAR; INTRAVENOUS at 00:24

## 2017-10-28 RX ADMIN — METOPROLOL TARTRATE 5 MG: 5 INJECTION INTRAVENOUS at 06:32

## 2017-10-28 RX ADMIN — AMIODARONE HYDROCHLORIDE 200 MG: 200 TABLET ORAL at 20:56

## 2017-10-28 RX ADMIN — DEXTROSE AND SODIUM CHLORIDE: 5; 450 INJECTION, SOLUTION INTRAVENOUS at 06:32

## 2017-10-28 RX ADMIN — FLUTICASONE PROPIONATE 1 SPRAY: 50 SPRAY, METERED NASAL at 09:28

## 2017-10-28 NOTE — PROGRESS NOTES
Surgery Progress Note    S:  No flatus-  Frustrated.  No nausea    O:   Vitals:  BP  Min: 129/74  Max: 164/80  Temp  Av.4  F (36.9  C)  Min: 97.9  F (36.6  C)  Max: 98.8  F (37.1  C)  Pulse  Av.7  Min: 63  Max: 90  I/O last 3 completed shifts:  In: 120 [P.O.:120]  Out: 1650 [Urine:150; Other:1500]    Physical Exam:  Alert   Chest=clear  Abd= soft, more distended, non-tender, less active                                   BS   Wd=A      Assessment/Plan: Post-op ileus after prolonged SBO   Started Reglan.  Try suppository.                                 Ensure clear for some nutrition.      Wm. Andrade MD

## 2017-10-28 NOTE — PLAN OF CARE
Problem: Patient Care Overview  Goal: Plan of Care/Patient Progress Review  Discharge Planner PT   Patient plan for discharge: Home  Current status: Pt requires SBA for bed mobility, good sitting balance at EOB. Pt demonstrates ability to perform sit to/from stand and stand pivot transfer with FWW and CGA. Pt ambulates 50' with FWW and CGA. Tolerated LE strengthening exercises without complaint.  Barriers to return to prior living situation: Decreased activity tolerance, level of assist, fall risk  Recommendations for discharge: TCU, however, if patient refuses TCU will need HHPT  Rationale for recommendations: Pt will benefit from daily continued therapy to address impairments and increase mobility and functional independence prior to returning home.        Entered by: Kathia Talley 10/28/2017 4:56 PM

## 2017-10-28 NOTE — PLAN OF CARE
Pt A&Ox4. VSS. Pt voided twice. Suppository Dulcolax given with no relief. Abdomen distended. Denies pain. Pt abdominal incision open to air and CDI with steri strips. Pt ambulated to and from shower with SBA of 2 with gait belt and walker. Continue to monitor bowel function.

## 2017-10-28 NOTE — PLAN OF CARE
Problem: Patient Care Overview  Goal: Plan of Care/Patient Progress Review  Outcome: Improving  Pt is alert but was confused at the begining of shift and very lethargic.Pt did void this evening even after a round of dialysis earlier in the day. PT IV became painful and was removed and replaced in a different location. Pt abdomin incision is open to the air and CDI with steri strips. Continue to monitor.

## 2017-10-28 NOTE — PLAN OF CARE
Problem: Patient Care Overview  Goal: Plan of Care/Patient Progress Review  Physical Therapy: Per nursing student, patient has just received suppository and is not appropriate for PT at this time.

## 2017-10-28 NOTE — PLAN OF CARE
Problem: Patient Care Overview  Goal: Plan of Care/Patient Progress Review    Discharge Planner OT   Patient plan for discharge: Home with family A and home OT/PT/RN  Current status: Pt required min A for sit > stand, ambulated to/from BR FWW level with CGA for safety, required mod A for sit > supine for LE management using log roll technique. Pt participated in seated BUE exercises against gravity, 10 reps x1 set x6 exercises, L wrist limited due to pain.   Barriers to return to prior living situation: Decreased activity tolerance and post-surgical precautions  Recommendations for discharge: Home with family A and home OT/PT/RN  Rationale for recommendations: Pt would benefit from skilled OT to maximize safety and indep in all ADLs, IADLs and mobility tasks as well as continued strengthening and functional activity tolerance training.        Entered by: Ekaterina Gamino 10/28/2017 3:17 PM

## 2017-10-28 NOTE — PROGRESS NOTES
[facility]    Hospitalist Progress Note    Assessment & Plan   Kathryn Banks is a 77 year old female who was admitted on 10/22/2017 with SBO and today is POD #4 after lysis of adhesions, no flatus yet, taking small sips of water:    Impression:   Principal Problem:    SBO -- S/P Lysis of Adhes 10/24/17  Active Problems:    CAD - NSTEMI, CABG x 5 2007, angio in 2013 with patent grafts other than occluded graft to PL    CRF (chronic renal failure), stage 4 (severe) (H)    Essential hypertension with goal blood pressure less than 140/90    Acute on chronic renal failure (H)    Paroxysmal atrial fibrillation (H)    Postoperative ileus (H)      Plan:  Continue Postop care, advance diet per surgery.      DVT Prophylaxis: Pneumatic Compression Devices  Code Status: Full Code    Disposition: Expected discharge in 2 days once bowel function returns.    Emmanuel Cordova MD  Pager [phone]  Cell Phone [phone]  Text Page (7am to 6pm)    Interval History   Having intermittent abdominal cramping but no flatus yet.      Physical Exam   Temp: 98.1  F (36.7  C) Temp src: Oral BP: 156/79 Pulse: 76 Heart Rate: 63 Resp: 16 SpO2: 96 % O2 Device: None (Room air) Oxygen Delivery: 1 LPM  Vitals:    10/23/17 1330 10/25/17 0600 10/27/17 1213   Weight: 65.8 kg (145 lb 1 oz) 68.6 kg (151 lb 3.8 oz) 68.6 kg (151 lb 3.8 oz)     Vital Signs with Ranges  Temp:  [97.9  F (36.6  C)-98.8  F (37.1  C)] 98.1  F (36.7  C)  Pulse:  [70-90] 76  Heart Rate:  [63-91] 63  Resp:  [16] 16  BP: (129-164)/(70-89) 156/79  SpO2:  [93 %-100 %] 96 %  I/O last 3 completed shifts:  In: 120 [P.O.:120]  Out: 1650 [Urine:150; Other:1500]    Constitutional: Awake, alert, cooperative, no apparent distress  Respiratory: Clear to auscultation bilaterally, no crackles or wheezing  Cardiovascular: Regular rate and rhythm, normal S1 and S2, and no murmur noted  GI: some bowel sounds, soft, non-distended, slight tenderness in right and left  lower quadrants.    Extrem: No calf tenderness, no ankle edema  Neuro: Ox3, no focal motor or sensory deficits    Medications     dextrose 5% and 0.45% NaCl 50 mL/hr at 10/28/17 0632     NaCl 10 mL/hr at 10/24/17 1352       metoclopramide  5 mg Intravenous Q8H     pramipexole  0.125 mg Oral TID     fluticasone  1 spray Both Nostrils Daily     pantoprazole  40 mg Intravenous QAM AC     metoprolol  5 mg Intravenous Q6H     miconazole   Topical BID     amiodarone (PACERONE/CODARONE) tablet 200 mg  200 mg Oral BID     - MEDICATION INSTRUCTIONS for Dialysis Patients -   Does not apply See Admin Instructions     insulin aspart  1-6 Units Subcutaneous Q4H       Data     Recent Labs  Lab 10/28/17  0725 10/27/17  1000 10/26/17  0734 10/24/17  0739 10/23/17  0809 10/22/17  2115 10/22/17  0305   WBC  --  6.7 5.9 3.6* 4.7  --  7.4   HGB  --  9.2* 8.9* 9.1* 9.4*  --  10.4*   MCV  --  91 90 91 91  --  88   PLT  --  320 310 311 324  --  357   INR  --   --   --   --  1.16* 1.45* 3.19*    140 139 142 138  --  136   POTASSIUM 3.6 3.4 3.4 3.8 4.1  --  4.1   CHLORIDE 106 108 103 105 100  --  96   CO2 29 26 29 29 28  --  30   BUN 4* 8 7 9 30  --  21   CR 2.15* 3.00* 2.22* 2.69* 4.96*  --  3.91*   ANIONGAP 6 6 7 8 10  --  10   MALICK 7.5* 7.2* 7.3* 7.5* 7.9*  --  8.2*   * 115* 107* 86 66*  --  110*   ALBUMIN  --   --   --   --   --   --  2.5*   PROTTOTAL  --   --   --   --   --   --  6.2*   BILITOTAL  --   --   --   --   --   --  0.3   ALKPHOS  --   --   --   --   --   --  180*   ALT  --   --   --   --   --   --  9   AST  --   --   --   --   --   --  14   LIPASE  --   --   --   --   --   --  124       Imaging:   No results found for this or any previous visit (from the past 24 hour(s)).

## 2017-10-29 ENCOUNTER — APPOINTMENT (OUTPATIENT)
Dept: OCCUPATIONAL THERAPY | Facility: CLINIC | Age: 77
DRG: 329 | End: 2017-10-29
Payer: MEDICARE

## 2017-10-29 ENCOUNTER — APPOINTMENT (OUTPATIENT)
Dept: PHYSICAL THERAPY | Facility: CLINIC | Age: 77
DRG: 329 | End: 2017-10-29
Payer: MEDICARE

## 2017-10-29 PROBLEM — N18.9 ANEMIA IN CHRONIC KIDNEY DISEASE: Status: ACTIVE | Noted: 2017-09-20

## 2017-10-29 PROBLEM — A41.9 SEPTIC SHOCK (H): Status: RESOLVED | Noted: 2017-10-02 | Resolved: 2017-10-29

## 2017-10-29 PROBLEM — E87.20 ACIDOSIS: Status: RESOLVED | Noted: 2017-08-12 | Resolved: 2017-10-29

## 2017-10-29 PROBLEM — N17.9 ACUTE KIDNEY INJURY (NONTRAUMATIC) (H): Status: RESOLVED | Noted: 2017-06-21 | Resolved: 2017-10-29

## 2017-10-29 PROBLEM — E87.70 VOLUME OVERLOAD: Status: RESOLVED | Noted: 2017-09-13 | Resolved: 2017-10-29

## 2017-10-29 PROBLEM — R65.21 SEPTIC SHOCK (H): Status: RESOLVED | Noted: 2017-10-02 | Resolved: 2017-10-29

## 2017-10-29 PROBLEM — R53.81 PHYSICAL DECONDITIONING: Status: RESOLVED | Noted: 2017-10-20 | Resolved: 2017-10-29

## 2017-10-29 PROBLEM — E87.20 METABOLIC ACIDOSIS, NORMAL ANION GAP (NAG): Status: RESOLVED | Noted: 2017-04-30 | Resolved: 2017-10-29

## 2017-10-29 PROBLEM — E11.9 TYPE 2 DIABETES MELLITUS (H): Status: ACTIVE | Noted: 2017-10-29

## 2017-10-29 PROBLEM — R11.0 NAUSEA: Status: RESOLVED | Noted: 2017-10-20 | Resolved: 2017-10-29

## 2017-10-29 PROBLEM — J81.1 PULMONARY EDEMA: Status: RESOLVED | Noted: 2017-08-26 | Resolved: 2017-10-29

## 2017-10-29 PROBLEM — R21 RASH: Status: RESOLVED | Noted: 2017-04-29 | Resolved: 2017-10-29

## 2017-10-29 PROBLEM — I48.20 CHRONIC ATRIAL FIBRILLATION (H): Status: RESOLVED | Noted: 2017-06-12 | Resolved: 2017-10-29

## 2017-10-29 PROBLEM — R79.1 SUPRATHERAPEUTIC INR: Status: RESOLVED | Noted: 2017-06-01 | Resolved: 2017-10-29

## 2017-10-29 LAB
GLUCOSE BLDC GLUCOMTR-MCNC: 101 MG/DL (ref 70–99)
GLUCOSE BLDC GLUCOMTR-MCNC: 103 MG/DL (ref 70–99)
GLUCOSE BLDC GLUCOMTR-MCNC: 106 MG/DL (ref 70–99)
GLUCOSE BLDC GLUCOMTR-MCNC: 138 MG/DL (ref 70–99)
GLUCOSE BLDC GLUCOMTR-MCNC: 142 MG/DL (ref 70–99)
GLUCOSE BLDC GLUCOMTR-MCNC: 175 MG/DL (ref 70–99)

## 2017-10-29 PROCEDURE — 25000125 ZZHC RX 250: Performed by: INTERNAL MEDICINE

## 2017-10-29 PROCEDURE — A9270 NON-COVERED ITEM OR SERVICE: HCPCS | Mod: GY | Performed by: SURGERY

## 2017-10-29 PROCEDURE — 25000132 ZZH RX MED GY IP 250 OP 250 PS 637: Mod: GY | Performed by: INTERNAL MEDICINE

## 2017-10-29 PROCEDURE — 25800025 ZZH RX 258: Performed by: INTERNAL MEDICINE

## 2017-10-29 PROCEDURE — 25000132 ZZH RX MED GY IP 250 OP 250 PS 637: Mod: GY | Performed by: SURGERY

## 2017-10-29 PROCEDURE — 97535 SELF CARE MNGMENT TRAINING: CPT | Mod: GO

## 2017-10-29 PROCEDURE — A9270 NON-COVERED ITEM OR SERVICE: HCPCS | Mod: GY | Performed by: INTERNAL MEDICINE

## 2017-10-29 PROCEDURE — 40000193 ZZH STATISTIC PT WARD VISIT: Performed by: PHYSICAL THERAPIST

## 2017-10-29 PROCEDURE — 25000128 H RX IP 250 OP 636: Performed by: SURGERY

## 2017-10-29 PROCEDURE — 40000133 ZZH STATISTIC OT WARD VISIT

## 2017-10-29 PROCEDURE — 97116 GAIT TRAINING THERAPY: CPT | Mod: GP | Performed by: PHYSICAL THERAPIST

## 2017-10-29 PROCEDURE — 97110 THERAPEUTIC EXERCISES: CPT | Mod: GP | Performed by: PHYSICAL THERAPIST

## 2017-10-29 PROCEDURE — 00000146 ZZHCL STATISTIC GLUCOSE BY METER IP

## 2017-10-29 PROCEDURE — 99233 SBSQ HOSP IP/OBS HIGH 50: CPT | Performed by: INTERNAL MEDICINE

## 2017-10-29 PROCEDURE — 12000007 ZZH R&B INTERMEDIATE

## 2017-10-29 PROCEDURE — 99207 ZZC CDG-MDM COMPONENT: MEETS MODERATE - UP CODED: CPT | Performed by: INTERNAL MEDICINE

## 2017-10-29 PROCEDURE — S5010 5% DEXTROSE AND 0.45% SALINE: HCPCS | Performed by: INTERNAL MEDICINE

## 2017-10-29 RX ORDER — WARFARIN SODIUM 5 MG/1
5 TABLET ORAL ONCE
Status: COMPLETED | OUTPATIENT
Start: 2017-10-29 | End: 2017-10-29

## 2017-10-29 RX ORDER — PANTOPRAZOLE SODIUM 40 MG/1
40 TABLET, DELAYED RELEASE ORAL EVERY MORNING
Status: DISCONTINUED | OUTPATIENT
Start: 2017-10-30 | End: 2017-11-01 | Stop reason: HOSPADM

## 2017-10-29 RX ORDER — WARFARIN SODIUM 2.5 MG/1
2.5 TABLET ORAL
Status: DISCONTINUED | OUTPATIENT
Start: 2017-10-29 | End: 2017-10-29

## 2017-10-29 RX ADMIN — BISACODYL 10 MG: 10 SUPPOSITORY RECTAL at 10:42

## 2017-10-29 RX ADMIN — AMIODARONE HYDROCHLORIDE 200 MG: 200 TABLET ORAL at 08:55

## 2017-10-29 RX ADMIN — PANTOPRAZOLE SODIUM 40 MG: 40 INJECTION, POWDER, FOR SOLUTION INTRAVENOUS at 06:27

## 2017-10-29 RX ADMIN — METOCLOPRAMIDE 5 MG: 5 INJECTION, SOLUTION INTRAMUSCULAR; INTRAVENOUS at 08:55

## 2017-10-29 RX ADMIN — AMIODARONE HYDROCHLORIDE 200 MG: 200 TABLET ORAL at 21:28

## 2017-10-29 RX ADMIN — PRAMIPEXOLE DIHYDROCHLORIDE 0.12 MG: 0.12 TABLET ORAL at 21:28

## 2017-10-29 RX ADMIN — MICONAZOLE NITRATE: 2 POWDER TOPICAL at 10:39

## 2017-10-29 RX ADMIN — WARFARIN SODIUM 5 MG: 5 TABLET ORAL at 08:55

## 2017-10-29 RX ADMIN — PRAMIPEXOLE DIHYDROCHLORIDE 0.12 MG: 0.12 TABLET ORAL at 18:22

## 2017-10-29 RX ADMIN — METOCLOPRAMIDE 5 MG: 5 INJECTION, SOLUTION INTRAMUSCULAR; INTRAVENOUS at 23:57

## 2017-10-29 RX ADMIN — Medication 12.5 MG: at 21:28

## 2017-10-29 RX ADMIN — METOCLOPRAMIDE 5 MG: 5 INJECTION, SOLUTION INTRAMUSCULAR; INTRAVENOUS at 18:23

## 2017-10-29 RX ADMIN — METOPROLOL TARTRATE 5 MG: 5 INJECTION INTRAVENOUS at 05:39

## 2017-10-29 RX ADMIN — Medication 5 MG: at 21:31

## 2017-10-29 RX ADMIN — FLUTICASONE PROPIONATE 1 SPRAY: 50 SPRAY, METERED NASAL at 09:01

## 2017-10-29 RX ADMIN — DEXTROSE AND SODIUM CHLORIDE: 5; 450 INJECTION, SOLUTION INTRAVENOUS at 04:12

## 2017-10-29 RX ADMIN — Medication 12.5 MG: at 08:54

## 2017-10-29 RX ADMIN — PRAMIPEXOLE DIHYDROCHLORIDE 0.12 MG: 0.12 TABLET ORAL at 08:54

## 2017-10-29 RX ADMIN — METOCLOPRAMIDE 5 MG: 5 INJECTION, SOLUTION INTRAMUSCULAR; INTRAVENOUS at 00:59

## 2017-10-29 RX ADMIN — MICONAZOLE NITRATE: 2 POWDER TOPICAL at 21:32

## 2017-10-29 RX ADMIN — DEXTROSE AND SODIUM CHLORIDE: 5; 450 INJECTION, SOLUTION INTRAVENOUS at 23:56

## 2017-10-29 RX ADMIN — INSULIN ASPART 1 UNITS: 100 INJECTION, SOLUTION INTRAVENOUS; SUBCUTANEOUS at 18:24

## 2017-10-29 NOTE — PROGRESS NOTES
"Surgery    No complaints.   Denies pain .  Tolerating clears.  Did not want ensure due to high carb content.   + bm's    Gen:  Awake, Alert, NAD, pleasant, conversational  Blood pressure 158/83, pulse 70, temperature 98  F (36.7  C), temperature source Oral, resp. rate 16, height 1.626 m (5' 4\"), weight 68.9 kg (151 lb 14.4 oz), SpO2 93 %, not currently breastfeeding.  Resp - clear to ascultation.    Cardiac - Regular rate & rhythm without murmur  Abdomen - bowel sounds present, soft, non tender, non distended. Incision healing appropriately without erythema or purulent drainage.  Extremities - no lower extremity edema.    Intake/Output Summary (Last 24 hours) at 10/29/17 1550  Last data filed at 10/29/17 1324   Gross per 24 hour   Intake              800 ml   Output              525 ml   Net              275 ml     A/P     - ileus resolved.  - full liquids  - dialysis tomorrow  - probably home Ansley Palumbo PA-C  Office: [phone]  Pager: [phone]    "

## 2017-10-29 NOTE — PLAN OF CARE
Problem: Patient Care Overview  Goal: Plan of Care/Patient Progress Review  Outcome: Improving  Pt AOx4, forgetful. Denies pain. BS hypoactive, passing flatus. No BM this shift. Abdomen distended. Incision CDI with steri strips. Up assist of 1 to BSC. D/C pending return of bowel function. Will continue to monitor.

## 2017-10-29 NOTE — PLAN OF CARE
Problem: Patient Care Overview  Goal: Plan of Care/Patient Progress Review  Discharge Planner OT   Patient plan for discharge: Home with family A and home OT/PT/RN  Current status: Patient transferred sit-stand Burak/ ambulated to bathroom SBA w/ FWW with increased time/effort. stood at sink and completed x2 G/H tasks - brush teeth, wash face, fatigued at end and requesting to return to chair. ambulated back to chair SBA with w/ FWW. while in chair therapist educated patient on AE for toileting- toileting aide, patient verbalized understanding and returned demo. patient donned/doffed pants with reacher Burak and verbal cues for sequencing. donned/doffed socks with sock aide, dressing stick and reacher SBA with VCs. Interested in purchasing toileting aide, dressing stick, long handed shoe horn.   Barriers to return to prior living situation: Decreased activity tolerance and post-surgical precautions  Recommendations for discharge: Home with family A and home OT/PT/RN  Rationale for recommendations: Pt would benefit from skilled OT to maximize safety and indep in all ADLs, IADLs and mobility tasks as well as continued strengthening and functional activity tolerance training.

## 2017-10-29 NOTE — PLAN OF CARE
Problem: Patient Care Overview  Goal: Plan of Care/Patient Progress Review  Outcome: Improving  Patient A&Ox4, VSs ON RA. IV infusing. Up with 1 & walker void BSC. Denies pain. Abd distended. +Bs & passing Flatus.Incision CDI.NPO except Sips water .BG check Q4hrs no coverage needed. Dialysis pt. Will continue monitoring.

## 2017-10-29 NOTE — PLAN OF CARE
Pt A&Ox4. VSS. Patient SBA of one with gait belt and walker. Ambulated x2 with PT and OT. Denies pain.  Voided once with bedside commode. Suppository Dulcolax given with 1 medium BM after. Passing flatus. Pt started clear liquid diet. Abdominal incision open to air and CDI with steri strips. Coumadin started daily. IV Metoprolol and Pantoprazole dc'ed and started via tablet form. Continue to monitor bowel function.

## 2017-10-29 NOTE — PROGRESS NOTES
[facility]    Hospitalist Progress Note    Assessment & Plan   Kathryn Banks is a 77 year old female who was admitted on 10/22/2017 with SBO and today is POD #5 after lysis of adhesions, having flatus and 3 small BM's:    Impression:   Principal Problem:    SBO -- S/P Lysis of Adhes 10/24/17  Active Problems:    ANABEL on CPAP    Esophageal reflux    CAD - NSTEMI, CABG x 5 in 2006, angio 2013 patent grafts except occluded graft to PL    CRF (chronic renal failure), stage 4 (severe) (H)    Essential hypertension with goal blood pressure less than 140/90    Acute on chronic renal failure -- Dialysis started 10/2017    Anemia in chronic kidney disease    Paroxysmal atrial fibrillation -- on Warfarin    Postoperative ileus (H)    DM type 2 -- Hgb A1C 6.6 on 10/13/17    Restless leg syndrome      Plan:  Continue Postop care, advance diet per surgery.  Continue warfarin. Can IV meds to PO.       DVT Prophylaxis: Pneumatic Compression Devices  Code Status: Full Code    Disposition: Expected discharge in 1 day once bowel function returns to TCU -- would like to return to [facility].    Emmanuel Cordova MD  Pager [phone]  Cell Phone [phone]  Text Page (7am to 6pm)    Interval History   Passing gas, 3 small BM's, feels OK.  Started clear liquids this AM.     Physical Exam   Temp: 98  F (36.7  C) Temp src: Oral BP: 158/83 Pulse: 70 Heart Rate: 87 Resp: 16 SpO2: 93 % O2 Device: None (Room air)    Vitals:    10/25/17 0600 10/27/17 1213 10/29/17 0600   Weight: 68.6 kg (151 lb 3.8 oz) 68.6 kg (151 lb 3.8 oz) 68.9 kg (151 lb 14.4 oz)     Vital Signs with Ranges  Temp:  [98  F (36.7  C)-98.9  F (37.2  C)] 98  F (36.7  C)  Pulse:  [70-93] 70  Heart Rate:  [81-87] 87  Resp:  [16] 16  BP: (128-158)/(63-83) 158/83  SpO2:  [90 %-96 %] 93 %  I/O last 3 completed shifts:  In: 800 [P.O.:800]  Out: 525 [Urine:525]    Constitutional: Awake, alert, cooperative, no apparent distress  Respiratory:  Clear to auscultation bilaterally, no crackles or wheezing  Cardiovascular: Regular rate and rhythm, normal S1 and S2, and no murmur noted  GI: some bowel sounds, soft, non-distended, slight tenderness in right and left lower quadrants, clean midline incision   Extrem: No calf tenderness, no ankle edema  Neuro: Ox3, no focal motor or sensory deficits    Medications     Warfarin Therapy Reminder       dextrose 5% and 0.45% NaCl 50 mL/hr at 10/29/17 0412     NaCl 10 mL/hr at 10/24/17 1352       [START ON 10/30/2017] pantoprazole  40 mg Oral QAM     metoprolol  12.5 mg Oral BID     metoclopramide  5 mg Intravenous Q8H     pramipexole  0.125 mg Oral TID     fluticasone  1 spray Both Nostrils Daily     miconazole   Topical BID     amiodarone (PACERONE/CODARONE) tablet 200 mg  200 mg Oral BID     - MEDICATION INSTRUCTIONS for Dialysis Patients -   Does not apply See Admin Instructions     insulin aspart  1-6 Units Subcutaneous Q4H       Data     Recent Labs  Lab 10/28/17  0725 10/27/17  1000 10/26/17  0734 10/24/17  0739 10/23/17  0809  10/22/17  2115   WBC  --  6.7 5.9 3.6* 4.7  < >  --    HGB  --  9.2* 8.9* 9.1* 9.4*  < >  --    MCV  --  91 90 91 91  < >  --    PLT  --  320 310 311 324  < >  --    INR  --   --   --   --  1.16*  --  1.45*    140 139 142 138  < >  --    POTASSIUM 3.6 3.4 3.4 3.8 4.1  < >  --    CHLORIDE 106 108 103 105 100  < >  --    CO2 29 26 29 29 28  < >  --    BUN 4* 8 7 9 30  < >  --    CR 2.15* 3.00* 2.22* 2.69* 4.96*  < >  --    ANIONGAP 6 6 7 8 10  < >  --    MALICK 7.5* 7.2* 7.3* 7.5* 7.9*  < >  --    * 115* 107* 86 66*  < >  --    < > = values in this interval not displayed.    Imaging:   No results found for this or any previous visit (from the past 24 hour(s)).

## 2017-10-29 NOTE — DISCHARGE SUMMARY
[facility]    Discharge Summary  Hospitalist    Date of Admission:  10/22/2017  Date of Discharge:  2017  Discharging Provider: Emmanuel Cordova MD    Discharge Diagnoses   Principal Problem:    SBO -- S/P Lysis of Adhes 10/24/17    Focal distal bowel perforation found at time of surgery    -- suspect occurred while in hospital (no free air on initial abd CT)  Active Problems:    ANABEL on CPAP    Esophageal reflux    CAD - NSTEMI, CABG x 5 in , angio 2013 patent grafts except occluded graft to PL    CRF (chronic renal failure), stage 4 (severe) (H)    Essential hypertension with goal blood pressure less than 140/90    Acute on chronic renal failure -- Dialysis started 10/2017    Anemia in chronic kidney disease    Paroxysmal atrial fibrillation -- on Warfarin    Postoperative ileus (H)    DM type 2 -- Hgb A1C 6.6 on 10/13/17    Restless leg syndrome       History of Present Illness   77-year-old female with a recent prolonged hospitalization with multiple medical issues due to acute hypoxic respiratory failure secondary to pneumonia and pulmonary edema and complicated by acute renal failure and since has been on dialysis, history of atrial fibrillation with RVR and on warfarin, recent C dif colitis which has since cleared -- who prevents with nausea and abdominal pain and found to have a small bowel obstruction with possible closed loop.        Hospital Course    Seen by Vascular surgery and had exploratory lap on 10/24 which showed:    1.  Distal ileal bowel obstruction secondary to adhesive bands.     2.  Focal perforation of distal 1/4 ileum secondary to adhesive band.   Underwent the followin.  Exploratory laparotomy.   2.  Lysis of  adhesions.   3.  Repair of focal distal ileal perforation.     Had prolonged postop ileus, continued on dialysis and occasional ultrafiltration to manage fluid status.  Overall did well, and bowels moving and good appetite at time of discharge.   Restarted on warfarin and INR 2.51 at time of discharge.      She said she was fine physically at discharge, but quite anxious mentally, and tearful about whether she will be able to take care of herself going forward.  I am hopeful that her strength will improved, she is urinating and it's possible she might be able to come off dialysis if she continues to improve (defer to Nephrology), and at present will continue dialysis Mon, Wed, Friday, and will check INR in 2 days then as needed.    Emmanuel Cordova MD  Pager: [phone]  Cell Phone:  [phone]     Significant Results and Procedures   As above    Code Status   Full Code       Primary Care Physician   Dheeraj Jj    Physical Exam   Temp: 97.9  F (36.6  C) Temp src: Oral BP: 170/80 Pulse: 78 Heart Rate: 74 Resp: 16 SpO2: 95 % O2 Device: None (Room air)    Vitals:    10/29/17 0600 10/30/17 0600 10/30/17 1123   Weight: 68.9 kg (151 lb 14.4 oz) 70.4 kg (155 lb 3.3 oz) 70.4 kg (155 lb 3.3 oz)     Vital Signs with Ranges  Temp:  [97.7  F (36.5  C)-98.5  F (36.9  C)] 97.9  F (36.6  C)  Pulse:  [78] 78  Heart Rate:  [64-80] 74  Resp:  [16] 16  BP: (137-170)/(74-90) 170/80  SpO2:  [94 %-97 %] 95 %  I/O last 3 completed shifts:  In: 325.4 [P.O.:240; I.V.:85.4]  Out: 1225 [Urine:275; Other:350; Stool:600]    Exam on discharge: Alert, oriented x 3, abdomin soft and non-tender with normal bowel sounds.    Discharge Disposition   Discharged to long-term care facility  Condition at discharge: Good    Consultations This Hospital Stay   SPIRITUAL HEALTH SERVICES IP CONSULT  SURGERY GENERAL IP CONSULT  NEPHROLOGY IP CONSULT  PHARMACY TO DOSE PHYTONADIONE  PHYSICAL THERAPY ADULT IP CONSULT  OCCUPATIONAL THERAPY ADULT IP CONSULT  SOCIAL WORK IP CONSULT  PHARMACY TO DOSE WARFARIN  SOCIAL WORK IP CONSULT  PHYSICAL THERAPY ADULT IP CONSULT  OCCUPATIONAL THERAPY ADULT IP CONSULT    Time Spent on this Encounter   I spent a total of 35 minutes discharging this patient.      Discharge Orders     General info for SNF   Length of Stay Estimate: Short Term Care: Estimated # of Days <30  Condition at Discharge: Improving  Level of care:skilled   Rehabilitation Potential: Good  Admission H&P remains valid and up-to-date: Yes  Recent Chemotherapy: N/A  Use Nursing Home Standing Orders: Yes     Mantoux instructions   Give two-step Mantoux (PPD) Per Facility Policy Yes     Reason for your hospital stay   Small bowel obstruction.     Additional Discharge Instructions   Call Dr. Stuart at Pager [phone] if questions, or Cell Phone [phone].     Activity - Up ad beata     Follow Up and recommended labs and tests   Follow up with primary care provider 1 week after discharge from TCU.  Check INR in 2 days, and adjust warfarin dose for desired INR of 2.0 to 3.0.      Follow up with Dr. Rashard Zafar, Vascular Surgery, in 2 weeks.    Continue Dialysis Mon, Wed, Friday as arranged by Nephrology. Daniel in [place]. Dialysis days are Monday, Wednesday, Friday. Patient needs to arrive by 1030 am. The dialysis unit phone number is [phone].     Glucose monitor nursing POCT   Glucometer check twice daily, call physician if > 200 x 3 consecutive checks.     Full Code     Physical Therapy Adult Consult   Evaluate and treat as clinically indicated.    Reason:  Weakness related to deconditioning     Occupational Therapy Adult Consult   Evaluate and treat as clinically indicated.    Reason:  Assistance with ADL's     Advance Diet as Tolerated   Follow this diet upon discharge: Renal Diet       Discharge Medications   Current Discharge Medication List      START taking these medications    Details   metoprolol (LOPRESSOR) 25 MG tablet Take 0.5 tablets (12.5 mg) by mouth 2 times daily  Qty: 60 tablet    Associated Diagnoses: Atrial flutter, unspecified type (H)      LORazepam (ATIVAN) 0.5 MG tablet Take 0.5 tablets (0.25 mg) by mouth every 8 hours as needed for anxiety  Qty: 20 tablet, Refills:  0    Associated Diagnoses: Anxiety      hypromellose-dextran (ARTIFICAL TEARS) SOLN ophthalmic solution Place 1 drop into both eyes 3 times daily as needed for dry eyes    Associated Diagnoses: Dry eyes      famotidine (PEPCID) 20 MG tablet Take 1 tablet (20 mg) by mouth 2 times daily as needed  Qty: 60 tablet, Refills: 1    Comments: For heartburn  Associated Diagnoses: Gastroesophageal reflux disease without esophagitis         CONTINUE these medications which have CHANGED    Details   warfarin (COUMADIN) 2 MG tablet 1 mg daily for aflut and adjust for desired INR 2.0 to 3.0  Qty: 30 tablet    Associated Diagnoses: Atrial flutter, unspecified type (H)         CONTINUE these medications which have NOT CHANGED    Details   AMIODARONE HCL PO Take 200 mg by mouth 2 times daily       nystatin (MYCOSTATIN) 208230 UNIT/GM POWD Apply topically 2 times daily as needed       cholecalciferol 5000 UNITS CAPS Take 1 capsule (5,000 Units) by mouth daily  Refills: 3    Associated Diagnoses: CKD (chronic kidney disease) stage 5, GFR less than 15 ml/min (H)      pramipexole (MIRAPEX) 0.125 MG tablet Take 1 tablet (0.125 mg) by mouth 3 times daily  Qty: 90 tablet, Refills: 3    Associated Diagnoses: Restless leg syndrome      fluticasone (FLONASE) 50 MCG/ACT spray Spray 1 spray into both nostrils daily  Qty: 1 Bottle, Refills: 0    Associated Diagnoses: Nasal congestion      ipratropium - albuterol 0.5 mg/2.5 mg/3 mL (DUONEB) 0.5-2.5 (3) MG/3ML neb solution Take 1 vial by nebulization 3 times daily      ACETAMINOPHEN PO Take 650 mg by mouth every 4 hours as needed for pain For pain 1-5 out of 10      nitroGLYcerin (NITROSTAT) 0.4 MG sublingual tablet Place 1 tablet (0.4 mg) under the tongue every 5 minutes as needed for chest pain  Qty: 25 tablet, Refills: 0    Associated Diagnoses: Hx of non-ST elevation myocardial infarction (NSTEMI); Atherosclerosis of native coronary artery of native heart without angina pectoris; Postsurgical  aortocoronary bypass status      pravastatin (PRAVACHOL) 40 MG tablet Take 1 tablet (40 mg) by mouth daily  Qty: 90 tablet, Refills: 3    Associated Diagnoses: Hx of non-ST elevation myocardial infarction (NSTEMI); Atherosclerosis of native coronary artery of native heart without angina pectoris; Type 2 diabetes mellitus with other circulatory complications      calcium acetate (PHOSLO) 667 MG CAPS capsule Take 1 capsule (667 mg) by mouth 3 times daily (with meals)  Qty: 180 capsule    Associated Diagnoses: Chronic kidney disease, unspecified CKD stage      pentoxifylline (TRENTAL) 400 MG CR tablet Take 1 tablet (400 mg) by mouth daily    Associated Diagnoses: Venous stasis ulcer (H)      ORDER FOR DME Equipment being ordered: cpap machine, mask, humidifier, tubing and filters  Qty: 1 Device, Refills: 0    Associated Diagnoses: ANABEL (obstructive sleep apnea)         STOP taking these medications       alum & mag hydroxide-simethicone (MYLANTA ES/MAALOX  ES) 400-400-40 MG/5ML SUSP suspension Comments:   Reason for Stopping:         sodium polystyrene (KAYEXALATE) 15 GM/60ML suspension Comments:   Reason for Stopping:         ondansetron (ZOFRAN ODT) 4 MG ODT tab Comments:   Reason for Stopping:         clonazePAM (KLONOPIN) 0.5 MG tablet Comments:   Reason for Stopping:         darbepoetin hira (ARANESP, ALBUMIN FREE,) 60 MCG/0.3ML injection Comments:   Reason for Stopping:         Calcium Carb-Cholecalciferol 500-400 MG-UNIT TABS Comments:   Reason for Stopping:         Lactobacillus (ACIDOPHILUS) tablet Comments:   Reason for Stopping:             Allergies   Allergies   Allergen Reactions     Ciprofloxacin Nausea and Vomiting     Zofran did not help     Oxycodone Visual Disturbance     Delusions, blackouts      Lisinopril Cough     Sulfa Drugs GI Disturbance     LOSS OF TASTE     Penicillins Other (See Comments)     Swelling and reddness at injection site, pt wanted it on      Data   Most Recent 3 CBC's:  Recent  Labs   Lab Test  10/27/17   1000  10/26/17   0734  10/24/17   0739   WBC  6.7  5.9  3.6*   HGB  9.2*  8.9*  9.1*   MCV  91  90  91   PLT  320  310  311      Most Recent 3 BMP's:  Recent Labs   Lab Test  11/01/17   0915  10/31/17   0745  10/30/17   1440   NA  142  140  145*   POTASSIUM  4.2  3.6  2.7*   CHLORIDE  108  105  111*   CO2  27  27  26   BUN  8  4*  2*   CR  2.85*  2.35*  1.09*   ANIONGAP  7  8  8   MALICK  7.1*  7.0*  5.6*   GLC  144*  167*  93     Most Recent 2 LFT's:  Recent Labs   Lab Test  10/22/17   0305  10/12/17   0456   AST  14  9   ALT  9  8   ALKPHOS  180*  128   BILITOTAL  0.3  0.4     Most Recent INR's and Anticoagulation Dosing History:  Anticoagulation Dose History     Recent Dosing and Labs Latest Ref Rng & Units 10/18/2017 10/22/2017 10/22/2017 10/23/2017 10/29/2017 10/30/2017 10/31/2017    Warfarin 5 mg - - - - - 5 mg - -    INR 0.86 - 1.14 1.73(H) 3.19(H) 1.45(H) 1.16(H) - 2.50(H) 2.51(H)    INR 0.86 - 1.14 - - - - - - -    INR Point of Care 0.86 - 1.14 - - - - - - -        Most Recent 3 Troponin's:  Recent Labs   Lab Test  08/26/17   0637  07/11/17   1604  07/11/17   0940   02/22/14   0024   TROPI  <0.015  0.022  0.022   < >   --    TROPONIN   --    --    --    --   0.01    < > = values in this interval not displayed.     Most Recent Cholesterol Panel:  Recent Labs   Lab Test  04/12/16   1132   CHOL  127   LDL  47   HDL  65   TRIG  73     Most Recent 6 Bacteria Isolates From Any Culture (See EPIC Reports for Culture Details):  Recent Labs   Lab Test  10/14/17   1540  10/14/17   1500  10/12/17   2045  10/11/17   1200  10/02/17   1430  10/01/17   2255   CULT  No growth  No growth  Moderate growth  Normal tyler    No growth  Light growth  Candida albicans / dubliniensis  Candida albicans and Candida dubliniensis are not routinely speciated  Susceptibility testing not routinely done  *  Light growth  Coagulase negative Staphylococcus  Susceptibility testing not routinely done  *  No growth      Most Recent TSH, T4 and A1c Labs:  Recent Labs   Lab Test  10/13/17   0644  10/11/17   0420   TSH   --   5.94*   T4   --   1.59*   A1C  6.6*   --

## 2017-10-29 NOTE — PLAN OF CARE
"Problem: Patient Care Overview  Goal: Plan of Care/Patient Progress Review  Outcome: No Change  VSS, denies pain.  Up with 1 and walker.  Abd distended/round \"softer\" than yesterday.  Positive BS, passing flatus, positive BM. Tolerating full liquid diet.  Incisions CD&I with strips.      "

## 2017-10-29 NOTE — PLAN OF CARE
Problem: Patient Care Overview  Goal: Plan of Care/Patient Progress Review  Discharge Planner PT    Patient plan for discharge: TCU  Current status: Pt requires increased time for transfers and gait. Amb  frequent rests in standing per pt request ~ every 20 feet. Also requested a wc follow. Performed seated exercises for strength and circulation benefits.   Barriers to return to prior living situation: Decreased activity tolerance, level of assist, fall risk  Recommendations for discharge: TCU  Rationale for recommendations: Pt will benefit from continued skilled PT services to address deficits and progress independence and safety with mobility.       Entered by: Rachel Hernandez 10/29/2017 2:53 PM

## 2017-10-30 ENCOUNTER — APPOINTMENT (OUTPATIENT)
Dept: OCCUPATIONAL THERAPY | Facility: CLINIC | Age: 77
DRG: 329 | End: 2017-10-30
Payer: MEDICARE

## 2017-10-30 LAB
ANION GAP SERPL CALCULATED.3IONS-SCNC: 8 MMOL/L (ref 3–14)
BUN SERPL-MCNC: 2 MG/DL (ref 7–30)
CALCIUM SERPL-MCNC: 5.6 MG/DL (ref 8.5–10.1)
CHLORIDE SERPL-SCNC: 111 MMOL/L (ref 94–109)
CO2 SERPL-SCNC: 26 MMOL/L (ref 20–32)
CREAT SERPL-MCNC: 1.09 MG/DL (ref 0.52–1.04)
GFR SERPL CREATININE-BSD FRML MDRD: 49 ML/MIN/1.7M2
GLUCOSE BLDC GLUCOMTR-MCNC: 111 MG/DL (ref 70–99)
GLUCOSE BLDC GLUCOMTR-MCNC: 111 MG/DL (ref 70–99)
GLUCOSE BLDC GLUCOMTR-MCNC: 130 MG/DL (ref 70–99)
GLUCOSE BLDC GLUCOMTR-MCNC: 178 MG/DL (ref 70–99)
GLUCOSE SERPL-MCNC: 93 MG/DL (ref 70–99)
INR PPP: 2.5 (ref 0.86–1.14)
POTASSIUM SERPL-SCNC: 2.7 MMOL/L (ref 3.4–5.3)
SODIUM SERPL-SCNC: 145 MMOL/L (ref 133–144)

## 2017-10-30 PROCEDURE — 12000007 ZZH R&B INTERMEDIATE

## 2017-10-30 PROCEDURE — A9270 NON-COVERED ITEM OR SERVICE: HCPCS | Mod: GY | Performed by: INTERNAL MEDICINE

## 2017-10-30 PROCEDURE — 85610 PROTHROMBIN TIME: CPT | Performed by: INTERNAL MEDICINE

## 2017-10-30 PROCEDURE — 90937 HEMODIALYSIS REPEATED EVAL: CPT

## 2017-10-30 PROCEDURE — 00000146 ZZHCL STATISTIC GLUCOSE BY METER IP

## 2017-10-30 PROCEDURE — 80048 BASIC METABOLIC PNL TOTAL CA: CPT | Performed by: INTERNAL MEDICINE

## 2017-10-30 PROCEDURE — 25000128 H RX IP 250 OP 636: Performed by: INTERNAL MEDICINE

## 2017-10-30 PROCEDURE — 40000133 ZZH STATISTIC OT WARD VISIT: Performed by: OCCUPATIONAL THERAPIST

## 2017-10-30 PROCEDURE — 63400005 ZZH RX 634: Performed by: INTERNAL MEDICINE

## 2017-10-30 PROCEDURE — 25000132 ZZH RX MED GY IP 250 OP 250 PS 637: Mod: GY | Performed by: INTERNAL MEDICINE

## 2017-10-30 PROCEDURE — 97535 SELF CARE MNGMENT TRAINING: CPT | Mod: GO | Performed by: OCCUPATIONAL THERAPIST

## 2017-10-30 PROCEDURE — 99232 SBSQ HOSP IP/OBS MODERATE 35: CPT | Performed by: INTERNAL MEDICINE

## 2017-10-30 PROCEDURE — S5010 5% DEXTROSE AND 0.45% SALINE: HCPCS | Performed by: INTERNAL MEDICINE

## 2017-10-30 PROCEDURE — 25000128 H RX IP 250 OP 636: Performed by: SURGERY

## 2017-10-30 PROCEDURE — 25800025 ZZH RX 258: Performed by: INTERNAL MEDICINE

## 2017-10-30 PROCEDURE — 97530 THERAPEUTIC ACTIVITIES: CPT | Mod: GO | Performed by: OCCUPATIONAL THERAPIST

## 2017-10-30 PROCEDURE — 25000132 ZZH RX MED GY IP 250 OP 250 PS 637: Mod: GY | Performed by: SURGERY

## 2017-10-30 RX ADMIN — PRAMIPEXOLE DIHYDROCHLORIDE 0.12 MG: 0.12 TABLET ORAL at 15:58

## 2017-10-30 RX ADMIN — PRAMIPEXOLE DIHYDROCHLORIDE 0.12 MG: 0.12 TABLET ORAL at 21:25

## 2017-10-30 RX ADMIN — AMIODARONE HYDROCHLORIDE 200 MG: 200 TABLET ORAL at 09:43

## 2017-10-30 RX ADMIN — LORAZEPAM 0.25 MG: 2 INJECTION INTRAMUSCULAR; INTRAVENOUS at 11:45

## 2017-10-30 RX ADMIN — DEXTRAN 70, AND HYPROMELLOSE 2910 1 DROP: 1; 3 SOLUTION/ DROPS OPHTHALMIC at 01:57

## 2017-10-30 RX ADMIN — SODIUM CHLORIDE 250 ML: 9 INJECTION, SOLUTION INTRAVENOUS at 12:29

## 2017-10-30 RX ADMIN — AMIODARONE HYDROCHLORIDE 200 MG: 200 TABLET ORAL at 21:25

## 2017-10-30 RX ADMIN — METOCLOPRAMIDE 5 MG: 5 INJECTION, SOLUTION INTRAMUSCULAR; INTRAVENOUS at 15:59

## 2017-10-30 RX ADMIN — FLUTICASONE PROPIONATE 1 SPRAY: 50 SPRAY, METERED NASAL at 09:45

## 2017-10-30 RX ADMIN — PRAMIPEXOLE DIHYDROCHLORIDE 0.12 MG: 0.12 TABLET ORAL at 09:43

## 2017-10-30 RX ADMIN — Medication 5 MG: at 21:39

## 2017-10-30 RX ADMIN — DEXTROSE AND SODIUM CHLORIDE: 5; 450 INJECTION, SOLUTION INTRAVENOUS at 20:22

## 2017-10-30 RX ADMIN — EPOETIN ALFA 4000 UNITS: 4000 SOLUTION INTRAVENOUS; SUBCUTANEOUS at 12:32

## 2017-10-30 RX ADMIN — SODIUM CHLORIDE 400 ML: 9 INJECTION, SOLUTION INTRAVENOUS at 12:31

## 2017-10-30 RX ADMIN — Medication 12.5 MG: at 21:25

## 2017-10-30 RX ADMIN — PANTOPRAZOLE SODIUM 40 MG: 40 TABLET, DELAYED RELEASE ORAL at 09:43

## 2017-10-30 NOTE — PROGRESS NOTES
Assessment and Plan:   ESRD: HD today for 1-2 L UF, RIJ CVC, 35 HCO3 and K protocol. 3 h run. Will need arrangements for dialysis when discharged.             Interval History:   Anemia: EPO on dialysis.       SBO?: S/P lysis of adhesions 10/24. Felt to have resolving ileus per Surgery.   Hypertension:  Metoprolol,            Review of Systems:   Taking po. No N or V or SOB.           Medications:       sodium chloride 0.9%  250 mL Hemodialysis Machine Once     sodium chloride 0.9%  250-1,000 mL Intravenous Once in dialysis     epoetin hira (EPOGEN,PROCRIT) inj ESRD  4,000 Units Intravenous Once     pantoprazole  40 mg Oral QAM     metoprolol  12.5 mg Oral BID     insulin aspart  1-7 Units Subcutaneous TID AC     insulin aspart  1-5 Units Subcutaneous At Bedtime     metoclopramide  5 mg Intravenous Q8H     pramipexole  0.125 mg Oral TID     fluticasone  1 spray Both Nostrils Daily     miconazole   Topical BID     amiodarone (PACERONE/CODARONE) tablet 200 mg  200 mg Oral BID     - MEDICATION INSTRUCTIONS for Dialysis Patients -   Does not apply See Admin Instructions       - MEDICATION INSTRUCTIONS -       Warfarin Therapy Reminder       dextrose 5% and 0.45% NaCl 50 mL/hr at 10/29/17 2356     NaCl 10 mL/hr at 10/24/17 1352     Current active medications and PTA medications reviewed, see medication list for details.            Physical Exam:   Vitals were reviewed  Patient Vitals for the past 24 hrs:   BP Temp Temp src Pulse Heart Rate Resp SpO2 Weight   10/30/17 0802 128/75 98.2  F (36.8  C) Oral 99 - 16 92 % -   10/30/17 0600 - - - - - - - 70.4 kg (155 lb 3.3 oz)   10/30/17 0446 - - - - - 16 - -   10/30/17 0335 139/74 97.7  F (36.5  C) Axillary - 84 16 90 % -   10/30/17 0245 - - - - - 16 - -   10/30/17 0001 142/77 98.8  F (37.1  C) Oral - 88 16 91 % -   10/29/17 2013 140/77 98.7  F (37.1  C) Oral 77 77 16 96 % -   10/29/17 1548 143/75 97.5  F (36.4  C) Oral 83 75 16 95 % -   10/29/17 1133 158/83 98  F  (36.7  C) Oral 70 - 16 93 % -       Temp:  [97.5  F (36.4  C)-98.8  F (37.1  C)] 98.2  F (36.8  C)  Pulse:  [70-99] 99  Heart Rate:  [75-88] 84  Resp:  [16] 16  BP: (128-158)/(74-83) 128/75  SpO2:  [90 %-96 %] 92 %    Temperatures:  Current - Temp: 98.2  F (36.8  C); Max - Temp  Av.2  F (36.8  C)  Min: 97.5  F (36.4  C)  Max: 98.8  F (37.1  C)  Respiration range: Resp  Av  Min: 16  Max: 16  Pulse range: Pulse  Av.3  Min: 70  Max: 99  Blood pressure range: Systolic (24hrs), Av , Min:128 , Max:158   ; Diastolic (24hrs), Av, Min:74, Max:83    Pulse oximetry range: SpO2  Av.8 %  Min: 90 %  Max: 96 %    I/O last 3 completed shifts:  In: 2129 [P.O.:1090; I.V.:1039]  Out: 250 [Urine:250]      Intake/Output Summary (Last 24 hours) at 10/30/17 1106  Last data filed at 10/30/17 0600   Gross per 24 hour   Intake             1789 ml   Output              150 ml   Net             1639 ml       Alert, responsive, resting in bed    I/O 2079/325       Wt Readings from Last 4 Encounters:   10/30/17 70.4 kg (155 lb 3.3 oz)   10/18/17 62.2 kg (137 lb 2 oz)   10/01/17 77.3 kg (170 lb 8 oz)   17 77.1 kg (170 lb)          Data:          Lab Results   Component Value Date     10/28/2017     10/27/2017     10/26/2017    Lab Results   Component Value Date    CHLORIDE 106 10/28/2017    CHLORIDE 108 10/27/2017    CHLORIDE 103 10/26/2017    Lab Results   Component Value Date    BUN 4 10/28/2017    BUN 8 10/27/2017    BUN 7 10/26/2017      Lab Results   Component Value Date    POTASSIUM 3.6 10/28/2017    POTASSIUM 3.4 10/27/2017    POTASSIUM 3.4 10/26/2017    Lab Results   Component Value Date    CO2 29 10/28/2017    CO2 26 10/27/2017    CO2 29 10/26/2017    Lab Results   Component Value Date    CR 2.15 10/28/2017    CR 3.00 10/27/2017    CR 2.22 10/26/2017        Recent Labs   Lab Test  10/27/17   1000  10/26/17   0734  10/24/17   0739   WBC  6.7  5.9  3.6*   HGB  9.2*  8.9*  9.1*   HCT   30.2*  29.4*  30.0*   MCV  91  90  91   PLT  320  310  311     Recent Labs   Lab Test  10/22/17   0305  10/12/17   0456  10/11/17   0420   AST  14  9  12   ALT  9  8  9   ALKPHOS  180*  128  145   BILITOTAL  0.3  0.4  0.3       Recent Labs   Lab Test  10/15/17   0700  10/10/17   0409  10/09/17   0441   MAG  1.8  1.7  2.1     Recent Labs   Lab Test  10/16/17   0545  10/15/17   0700  10/12/17   1600   PHOS  4.9*  3.8  3.9     Recent Labs   Lab Test  10/28/17   0725  10/27/17   1000  10/26/17   0734   MALICK  7.5*  7.2*  7.3*       Lab Results   Component Value Date    MALICK 7.5 (L) 10/28/2017     Lab Results   Component Value Date    WBC 6.7 10/27/2017    HGB 9.2 (L) 10/27/2017    HCT 30.2 (L) 10/27/2017    MCV 91 10/27/2017     10/27/2017     Lab Results   Component Value Date     10/28/2017    POTASSIUM 3.6 10/28/2017    CHLORIDE 106 10/28/2017    CO2 29 10/28/2017     (H) 10/28/2017     Lab Results   Component Value Date    BUN 4 (L) 10/28/2017    CR 2.15 (H) 10/28/2017     Lab Results   Component Value Date    MAG 1.8 10/15/2017     Lab Results   Component Value Date    PHOS 4.9 (H) 10/16/2017       Creatinine   Date Value Ref Range Status   10/28/2017 2.15 (H) 0.52 - 1.04 mg/dL Final   10/27/2017 3.00 (H) 0.52 - 1.04 mg/dL Final   10/26/2017 2.22 (H) 0.52 - 1.04 mg/dL Final   10/24/2017 2.69 (H) 0.52 - 1.04 mg/dL Final   10/23/2017 4.96 (H) 0.52 - 1.04 mg/dL Final   10/22/2017 3.91 (H) 0.52 - 1.04 mg/dL Final       Attestation:  I have reviewed today's vital signs, notes, medications, labs and imaging.  Seen on dialysis.      Hank Guerra MD

## 2017-10-30 NOTE — PROGRESS NOTES
SW:    I: EVELIO following for discharge planning. Anticipate discharge tomorrow 10/31. Therapy is recommending TCU at this time, EVELIO re-faxed information to [facility] TCU to review. Pt had desired home with HC, will discuss with pt today, currently at dialysis.     P: SW to follow.    ADDENDUM: Steve Yadi ([phone] confirmed that referral was received, will review and contact this writer re: ability to accept. EVELIO met with pt in dialysis who confirmed that she is planning for TCU at discharge and continues to desire placement at [facility].    1615: Per Kyle at [facility], facility has filled their beds for tomorrow (unless a hospital discharge falls through), however anticipate an opening on Wed 11/1. Pt is clinically appropriate for placement at this location. SW to touch base with admissions tomorrow to confirm.    SHANTELL Vázquez, [facility]  Daytime (8:00am-4:30pm): [phone]  After-Hours SW Pager (4:30pm-11:30pm): [phone]

## 2017-10-30 NOTE — PLAN OF CARE
Problem: Patient Care Overview  Goal: Plan of Care/Patient Progress Review  Discharge Planner OT   Patient plan for discharge: TCU  Current status: Pt Max A to transfer sit > stand from chair with FWW with vc's for tech, though is CGA for toilet transfer with BSC and FWW, likely due to increased height of BSC. Pt completes clothing retrieval from closet using FWW with CGA. Pt toilets and is CGA for LB clothing mgmt and I with toileting hygiene. Pt washes hands and brushes teeth standing at sink with CGA and vc to bring FWW up to sink. Wrote down list of AE pt desires so pt can bring list to TCU.   Barriers to return to prior living situation: Decreased activity tolerance and strength, current level of A with ADLs/IADLs and functional mobility.  Recommendations for discharge: TCU  Rationale for recommendations: Daily skilled therapy to ensure safety and return to PLOF with ADLs/IADLs and functional mobility.        Entered by: Myrna Andino 10/30/2017 11:40 AM

## 2017-10-30 NOTE — PROGRESS NOTES
Surgery Progress Note    S: Tolerating soft diet.  ++ flatus    O:   Vitals:  BP  Min: 128/75  Max: 158/83  Temp  Av.2  F (36.8  C)  Min: 97.5  F (36.4  C)  Max: 98.8  F (37.1  C)  Pulse  Av.3  Min: 70  Max: 99  I/O last 3 completed shifts:  In: 2129 [P.O.:1090; I.V.:1039]  Out: 250 [Urine:250]    Physical Exam: Up in chair.  Very comfortable.  Happy with resolution of ileus yesterday.                            Abdomen= soft, active bowels, minimal distention                            Wound=A      Assessment/Plan: Doing very well.  Slowly advance diet.  Dialysis today.                                 Physical therapy daily.  Still very weak.                                 Would plan for hospital discharge tomorrow.      Wm. Andrade MD

## 2017-10-30 NOTE — PROGRESS NOTES
Potassium   Date Value Ref Range Status   10/28/2017 3.6 3.4 - 5.3 mmol/L Final   ]  Lab Results   Component Value Date    HGB 9.2 10/27/2017     Weight: 70.4 kg (155 lb 3.3 oz)  POST WT 68.4 kg    DIALYSIS PROCEDURE NOTE  Hepatitis status of previous patient on machine log was checked and ok to use with this patient hepatitis status.  Patient dialyzed for 3 hrs on a 3 K bath with a net fluid removal of 2 L.  A BFR of 400 ml/min was obtained via RIJ.  Total heparin received during treatment: 0units. Heparin instilled in both ports post run.    Meds given: Epogen IV.  Complications: Some anxiety at start of treatment, ativan given by primary RN. Otherwise no complications.      Procedure and ESRD teaching done and questions answered.  See flowsheet in EPIC for further details and post assessment.  Machine water alarm in place and functioning.  HEPATITIS B SURFACE ANTIGEN negative Date 10/6/17 HEPATITIS B SURFACE ANTIBODY immune DATE 10/3/17  CHLORINE AND CHLORAMINES CHECK on WATER SYSTEM EVERY 4 hours.   PT returned via bed a/o x4.   Catheter Access: Aseptic prep done for both on/off. Dressing CDI. Adequate BFR.   Report received from: CHANTAL Villatoro, RN  Report given to: ROSARIO Woodruff RN

## 2017-10-30 NOTE — PLAN OF CARE
Problem: Patient Care Overview  Goal: Plan of Care/Patient Progress Review  PT-  Unable to see pt for PT this PM as pt is at dialysis.

## 2017-10-30 NOTE — PLAN OF CARE
Problem: Patient Care Overview  Goal: Plan of Care/Patient Progress Review  Outcome: Improving  VSS. A/O. SBA walker. Had few BM this afternoon. Active BS. Tolerating diet. Denies pain. Dialysis tomorrow.

## 2017-10-30 NOTE — PLAN OF CARE
Problem: Patient Care Overview  Goal: Plan of Care/Patient Progress Review  Outcome: No Change  A&O x4. Bowel sounds active in all 4 quadrants. VSS on RA. Incision RASHIDA. Up with SBA. Denies pain. Dialysis at 1115 today.

## 2017-10-30 NOTE — PROGRESS NOTES
Pt will need to resume MWF Dialysis at [facility] upon discharge.   [phone] (or [phone])

## 2017-10-30 NOTE — PLAN OF CARE
Problem: Patient Care Overview  Goal: Plan of Care/Patient Progress Review  Outcome: No Change  Patient alert and oriented x4. +Bowel sounds. VSS on RA. Denies pain. Tolerating full liquid diet. Incisions CDI. Up with assist of 1 and walker. D5 1/2 NS infusing. Plan to d/c to TCU pending bowel function.

## 2017-10-31 ENCOUNTER — APPOINTMENT (OUTPATIENT)
Dept: PHYSICAL THERAPY | Facility: CLINIC | Age: 77
DRG: 329 | End: 2017-10-31
Payer: MEDICARE

## 2017-10-31 ENCOUNTER — APPOINTMENT (OUTPATIENT)
Dept: OCCUPATIONAL THERAPY | Facility: CLINIC | Age: 77
DRG: 329 | End: 2017-10-31
Payer: MEDICARE

## 2017-10-31 LAB
ANION GAP SERPL CALCULATED.3IONS-SCNC: 8 MMOL/L (ref 3–14)
BUN SERPL-MCNC: 4 MG/DL (ref 7–30)
CALCIUM SERPL-MCNC: 7 MG/DL (ref 8.5–10.1)
CHLORIDE SERPL-SCNC: 105 MMOL/L (ref 94–109)
CO2 SERPL-SCNC: 27 MMOL/L (ref 20–32)
CREAT SERPL-MCNC: 2.35 MG/DL (ref 0.52–1.04)
GFR SERPL CREATININE-BSD FRML MDRD: 20 ML/MIN/1.7M2
GLUCOSE BLDC GLUCOMTR-MCNC: 115 MG/DL (ref 70–99)
GLUCOSE BLDC GLUCOMTR-MCNC: 131 MG/DL (ref 70–99)
GLUCOSE BLDC GLUCOMTR-MCNC: 144 MG/DL (ref 70–99)
GLUCOSE BLDC GLUCOMTR-MCNC: 149 MG/DL (ref 70–99)
GLUCOSE SERPL-MCNC: 167 MG/DL (ref 70–99)
INR PPP: 2.51 (ref 0.86–1.14)
POTASSIUM SERPL-SCNC: 3.6 MMOL/L (ref 3.4–5.3)
SODIUM SERPL-SCNC: 140 MMOL/L (ref 133–144)

## 2017-10-31 PROCEDURE — 40000133 ZZH STATISTIC OT WARD VISIT: Performed by: OCCUPATIONAL THERAPIST

## 2017-10-31 PROCEDURE — A9270 NON-COVERED ITEM OR SERVICE: HCPCS | Mod: GY | Performed by: INTERNAL MEDICINE

## 2017-10-31 PROCEDURE — 97530 THERAPEUTIC ACTIVITIES: CPT | Mod: GP | Performed by: PHYSICAL THERAPY ASSISTANT

## 2017-10-31 PROCEDURE — 97116 GAIT TRAINING THERAPY: CPT | Mod: GP | Performed by: PHYSICAL THERAPY ASSISTANT

## 2017-10-31 PROCEDURE — 97110 THERAPEUTIC EXERCISES: CPT | Mod: GO | Performed by: OCCUPATIONAL THERAPIST

## 2017-10-31 PROCEDURE — 25000132 ZZH RX MED GY IP 250 OP 250 PS 637: Mod: GY | Performed by: INTERNAL MEDICINE

## 2017-10-31 PROCEDURE — 00000146 ZZHCL STATISTIC GLUCOSE BY METER IP

## 2017-10-31 PROCEDURE — 25000132 ZZH RX MED GY IP 250 OP 250 PS 637: Mod: GY | Performed by: SURGERY

## 2017-10-31 PROCEDURE — 99207 ZZC CDG-MDM COMPONENT: MEETS MODERATE - UP CODED: CPT | Performed by: INTERNAL MEDICINE

## 2017-10-31 PROCEDURE — 12000007 ZZH R&B INTERMEDIATE

## 2017-10-31 PROCEDURE — 40000193 ZZH STATISTIC PT WARD VISIT: Performed by: PHYSICAL THERAPY ASSISTANT

## 2017-10-31 PROCEDURE — 80048 BASIC METABOLIC PNL TOTAL CA: CPT | Performed by: INTERNAL MEDICINE

## 2017-10-31 PROCEDURE — 36415 COLL VENOUS BLD VENIPUNCTURE: CPT | Performed by: INTERNAL MEDICINE

## 2017-10-31 PROCEDURE — 97110 THERAPEUTIC EXERCISES: CPT | Mod: GP | Performed by: PHYSICAL THERAPY ASSISTANT

## 2017-10-31 PROCEDURE — 99233 SBSQ HOSP IP/OBS HIGH 50: CPT | Performed by: INTERNAL MEDICINE

## 2017-10-31 PROCEDURE — 25000128 H RX IP 250 OP 636: Performed by: SURGERY

## 2017-10-31 PROCEDURE — 85610 PROTHROMBIN TIME: CPT | Performed by: INTERNAL MEDICINE

## 2017-10-31 RX ORDER — LORAZEPAM 0.5 MG/1
0.25 TABLET ORAL EVERY 8 HOURS PRN
Qty: 20 TABLET | Refills: 0 | Status: SHIPPED | OUTPATIENT
Start: 2017-10-31 | End: 2018-04-17

## 2017-10-31 RX ORDER — WARFARIN SODIUM 1 MG/1
1 TABLET ORAL
Status: COMPLETED | OUTPATIENT
Start: 2017-10-31 | End: 2017-10-31

## 2017-10-31 RX ORDER — FAMOTIDINE 20 MG/1
20 TABLET, FILM COATED ORAL 2 TIMES DAILY PRN
Qty: 60 TABLET | Refills: 1
Start: 2017-10-31 | End: 2018-01-24

## 2017-10-31 RX ORDER — METOPROLOL TARTRATE 25 MG/1
12.5 TABLET, FILM COATED ORAL 2 TIMES DAILY
Qty: 60 TABLET
Start: 2017-10-31 | End: 2017-11-21

## 2017-10-31 RX ORDER — WARFARIN SODIUM 2 MG/1
TABLET ORAL
Qty: 30 TABLET
Start: 2017-10-31 | End: 2018-04-03

## 2017-10-31 RX ADMIN — Medication 5 MG: at 22:31

## 2017-10-31 RX ADMIN — PRAMIPEXOLE DIHYDROCHLORIDE 0.12 MG: 0.12 TABLET ORAL at 22:41

## 2017-10-31 RX ADMIN — FLUTICASONE PROPIONATE 1 SPRAY: 50 SPRAY, METERED NASAL at 08:53

## 2017-10-31 RX ADMIN — METOCLOPRAMIDE 5 MG: 5 INJECTION, SOLUTION INTRAMUSCULAR; INTRAVENOUS at 01:29

## 2017-10-31 RX ADMIN — Medication 12.5 MG: at 08:50

## 2017-10-31 RX ADMIN — AMIODARONE HYDROCHLORIDE 200 MG: 200 TABLET ORAL at 08:50

## 2017-10-31 RX ADMIN — PRAMIPEXOLE DIHYDROCHLORIDE 0.12 MG: 0.12 TABLET ORAL at 08:50

## 2017-10-31 RX ADMIN — PANTOPRAZOLE SODIUM 40 MG: 40 TABLET, DELAYED RELEASE ORAL at 08:50

## 2017-10-31 RX ADMIN — PRAMIPEXOLE DIHYDROCHLORIDE 0.12 MG: 0.12 TABLET ORAL at 22:28

## 2017-10-31 RX ADMIN — AMIODARONE HYDROCHLORIDE 200 MG: 200 TABLET ORAL at 22:28

## 2017-10-31 RX ADMIN — Medication 12.5 MG: at 22:28

## 2017-10-31 RX ADMIN — WARFARIN SODIUM 1 MG: 1 TABLET ORAL at 19:44

## 2017-10-31 NOTE — PLAN OF CARE
Problem: Patient Care Overview  Goal: Plan of Care/Patient Progress Review  Outcome: No Change  VSS on RA. Abdominal incision intact with steri strips. Denies pain. +BS, +flatus. Denies nausea. LS clear. Up SBA and walker. Tolerating fulls. Voiding. Possible d/c later today, TCU placement pending.

## 2017-10-31 NOTE — PROGRESS NOTES
[facility]    Hospitalist Progress Note    Assessment & Plan   Kathryn Banks is a 77 year old female who was admitted on 10/22/2017 with SBO and today is POD #5 after lysis of adhesions, advancing to full liquids, bowels moved.  Dialysis today.     Impression:   Principal Problem:    SBO -- S/P Lysis of Adhes 10/24/17  Active Problems:    ANABEL on CPAP    Esophageal reflux    CAD - NSTEMI, CABG x 5 in 2006, angio 2013 patent grafts except occluded graft to PL    CRF (chronic renal failure), stage 4 (severe) (H)    Essential hypertension with goal blood pressure less than 140/90    Acute on chronic renal failure -- Dialysis started 10/2017 -- ?renal function improving    Anemia in chronic kidney disease    Paroxysmal atrial fibrillation -- on Warfarin, hold warfarin today    Postoperative ileus (H)    DM type 2 -- Hgb A1C 6.6 on 10/13/17    Restless leg syndrome      Plan:  Continue Postop care, advance diet per surgery.  Continue warfarin. Can IV meds to PO.       DVT Prophylaxis: Pneumatic Compression Devices  Code Status: Full Code    Disposition: Expected discharge in 1 day once bowel function returns to TCU -- would like to return to [facility].    Emmanuel Cordova MD  Pager [phone]  Cell Phone [phone]  Text Page (7am to 6pm)    Interval History   Feels good, tolerating clear liquids.  She believes she is urinating more.      Physical Exam   Temp: 98.2  F (36.8  C) Temp src: Oral BP: 157/78 Pulse: 71 Heart Rate: 84 Resp: 16 SpO2: 96 % O2 Device: None (Room air)    Vitals:    10/29/17 0600 10/30/17 0600 10/30/17 1123   Weight: 68.9 kg (151 lb 14.4 oz) 70.4 kg (155 lb 3.3 oz) 70.4 kg (155 lb 3.3 oz)     Vital Signs with Ranges  Temp:  [97.7  F (36.5  C)-98.8  F (37.1  C)] 98.2  F (36.8  C)  Pulse:  [68-99] 71  Heart Rate:  [77-88] 84  Resp:  [16] 16  BP: (128-173)/(67-90) 157/78  SpO2:  [90 %-96 %] 96 %  I/O last 3 completed shifts:  In: 1329 [P.O.:290;  I.V.:1039]  Out: 150 [Urine:150]    Constitutional: Awake, alert, cooperative, no apparent distress  Respiratory: Clear to auscultation bilaterally, no crackles or wheezing  Cardiovascular: Regular rate and rhythm, normal S1 and S2, and no murmur noted  GI: some bowel sounds, soft, non-distended, slight tenderness in right and left lower quadrants, clean midline incision   Extrem: No calf tenderness, no ankle edema  Neuro: Ox3, no focal motor or sensory deficits    Medications     Warfarin Therapy Reminder       dextrose 5% and 0.45% NaCl 50 mL/hr at 10/30/17 0943     NaCl 10 mL/hr at 10/24/17 1352       warfarin-No DOSE today  1 each Does not apply no dose today (warfarin)     pantoprazole  40 mg Oral QAM     metoprolol  12.5 mg Oral BID     insulin aspart  1-7 Units Subcutaneous TID AC     insulin aspart  1-5 Units Subcutaneous At Bedtime     metoclopramide  5 mg Intravenous Q8H     pramipexole  0.125 mg Oral TID     fluticasone  1 spray Both Nostrils Daily     miconazole   Topical BID     amiodarone (PACERONE/CODARONE) tablet 200 mg  200 mg Oral BID     - MEDICATION INSTRUCTIONS for Dialysis Patients -   Does not apply See Admin Instructions       Data     Recent Labs  Lab 10/30/17  1440 10/28/17  0725 10/27/17  1000 10/26/17  0734 10/24/17  0739   WBC  --   --  6.7 5.9 3.6*   HGB  --   --  9.2* 8.9* 9.1*   MCV  --   --  91 90 91   PLT  --   --  320 310 311   INR 2.50*  --   --   --   --    * 141 140 139 142   POTASSIUM 2.7* 3.6 3.4 3.4 3.8   CHLORIDE 111* 106 108 103 105   CO2 26 29 26 29 29   BUN 2* 4* 8 7 9   CR 1.09* 2.15* 3.00* 2.22* 2.69*   ANIONGAP 8 6 6 7 8   MALICK 5.6* 7.5* 7.2* 7.3* 7.5*   GLC 93 115* 115* 107* 86       Imaging:   No results found for this or any previous visit (from the past 24 hour(s)).

## 2017-10-31 NOTE — PROGRESS NOTES
Assessment and Plan:   ESRD: HD yesterday, 3H, 3K, 2 L UF, , RIJ CVC. Pt will need to resume MWF Dialysis at [facility] upon discharge.   [phone] (or [phone]). Dialysis in am if still here.             Interval History:   Anemia: EPO on dialysis.       SBO?: S/P lysis of adhesions 10/24. Felt to have resolving ileus per Surgery.   Hypertension:  Metoprolol                 Review of Systems:   Tolerating po well. Surgery feels ok for discharge. To go to TCU.           Medications:       pantoprazole  40 mg Oral QAM     metoprolol  12.5 mg Oral BID     insulin aspart  1-7 Units Subcutaneous TID AC     insulin aspart  1-5 Units Subcutaneous At Bedtime     metoclopramide  5 mg Intravenous Q8H     pramipexole  0.125 mg Oral TID     fluticasone  1 spray Both Nostrils Daily     miconazole   Topical BID     amiodarone (PACERONE/CODARONE) tablet 200 mg  200 mg Oral BID     - MEDICATION INSTRUCTIONS for Dialysis Patients -   Does not apply See Admin Instructions       Warfarin Therapy Reminder       dextrose 5% and 0.45% NaCl 50 mL/hr at 10/30/17 2022     NaCl 10 mL/hr at 10/24/17 1352     Current active medications and PTA medications reviewed, see medication list for details.            Physical Exam:   Vitals were reviewed  Patient Vitals for the past 24 hrs:   BP Temp Temp src Pulse Heart Rate Resp SpO2 Weight   10/31/17 0805 120/67 99.1  F (37.3  C) Oral - 88 14 95 % -   10/31/17 0141 133/69 97.4  F (36.3  C) Oral - 78 14 94 % -   10/30/17 2035 128/65 98.7  F (37.1  C) Oral 82 - 14 92 % -   10/30/17 1500 157/78 98.2  F (36.8  C) Oral 71 - 16 96 % -   10/30/17 1446 137/74 - - 69 - - - -   10/30/17 1430 139/71 - - 71 - - - -   10/30/17 1415 148/74 - - 79 - - - -   10/30/17 1400 130/67 - - 87 - - - -   10/30/17 1345 134/72 - - 87 - - - -   10/30/17 1330 139/75 - - 69 - - - -   10/30/17 1315 143/78 - - 68 - - - -   10/30/17 1300 156/81 - - 80 - - - -   10/30/17 1245 142/78 -  - 72 - - - -   10/30/17 1230 147/83 - - 83 - - - -   10/30/17 1215 146/82 - - 88 - - - -   10/30/17 1200 173/87 - - 75 - - - -   10/30/17 1146 169/89 - - 73 - 16 - -   10/30/17 1145 163/90 98  F (36.7  C) Oral 76 - 16 96 % -   10/30/17 1123 - - - - - - - 70.4 kg (155 lb 3.3 oz)   10/30/17 1122 150/84 97.9  F (36.6  C) Oral 77 - 16 95 % -       Temp:  [97.4  F (36.3  C)-99.1  F (37.3  C)] 99.1  F (37.3  C)  Pulse:  [68-88] 82  Heart Rate:  [78-88] 88  Resp:  [14-16] 14  BP: (120-173)/(65-90) 120/67  SpO2:  [92 %-96 %] 95 %    Temperatures:  Current - Temp: 99.1  F (37.3  C); Max - Temp  Av.2  F (36.8  C)  Min: 97.4  F (36.3  C)  Max: 99.1  F (37.3  C)  Respiration range: Resp  Avg: 15.1  Min: 14  Max: 16  Pulse range: Pulse  Av.9  Min: 68  Max: 88  Blood pressure range: Systolic (24hrs), Av , Min:120 , Max:173   ; Diastolic (24hrs), Av, Min:65, Max:90    Pulse oximetry range: SpO2  Av.7 %  Min: 92 %  Max: 96 %    I/O last 3 completed shifts:  In: 1685 [P.O.:100; I.V.:1585]  Out:  [Other:]      Intake/Output Summary (Last 24 hours) at 10/31/17 1038  Last data filed at 10/31/17 1034   Gross per 24 hour   Intake             1685 ml   Output             2175 ml   Net             -490 ml       Alert, up in chair  LE no edema, + stasis changes  Lungs with clear BS  Cor RRR nl S1 S2 no M  CVC on R with no redness or tenderness        Wt Readings from Last 4 Encounters:   10/30/17 70.4 kg (155 lb 3.3 oz)   10/18/17 62.2 kg (137 lb 2 oz)   10/01/17 77.3 kg (170 lb 8 oz)   17 77.1 kg (170 lb)          Data:          Lab Results   Component Value Date     10/31/2017     10/30/2017     10/28/2017    Lab Results   Component Value Date    CHLORIDE 105 10/31/2017    CHLORIDE 111 10/30/2017    CHLORIDE 106 10/28/2017    Lab Results   Component Value Date    BUN 4 10/31/2017    BUN 2 10/30/2017    BUN 4 10/28/2017      Lab Results   Component Value Date    POTASSIUM 3.6  10/31/2017    POTASSIUM 2.7 10/30/2017    POTASSIUM 3.6 10/28/2017    Lab Results   Component Value Date    CO2 27 10/31/2017    CO2 26 10/30/2017    CO2 29 10/28/2017    Lab Results   Component Value Date    CR 2.35 10/31/2017    CR 1.09 10/30/2017    CR 2.15 10/28/2017        Recent Labs   Lab Test  10/27/17   1000  10/26/17   0734  10/24/17   0739   WBC  6.7  5.9  3.6*   HGB  9.2*  8.9*  9.1*   HCT  30.2*  29.4*  30.0*   MCV  91  90  91   PLT  320  310  311     Recent Labs   Lab Test  10/22/17   0305  10/12/17   0456  10/11/17   0420   AST  14  9  12   ALT  9  8  9   ALKPHOS  180*  128  145   BILITOTAL  0.3  0.4  0.3       Recent Labs   Lab Test  10/15/17   0700  10/10/17   0409  10/09/17   0441   MAG  1.8  1.7  2.1     Recent Labs   Lab Test  10/16/17   0545  10/15/17   0700  10/12/17   1600   PHOS  4.9*  3.8  3.9     Recent Labs   Lab Test  10/31/17   0745  10/30/17   1440  10/28/17   0725   MALICK  7.0*  5.6*  7.5*       Lab Results   Component Value Date    MALICK 7.0 (L) 10/31/2017     Lab Results   Component Value Date    WBC 6.7 10/27/2017    HGB 9.2 (L) 10/27/2017    HCT 30.2 (L) 10/27/2017    MCV 91 10/27/2017     10/27/2017     Lab Results   Component Value Date     10/31/2017    POTASSIUM 3.6 10/31/2017    CHLORIDE 105 10/31/2017    CO2 27 10/31/2017     (H) 10/31/2017     Lab Results   Component Value Date    BUN 4 (L) 10/31/2017    CR 2.35 (H) 10/31/2017     Lab Results   Component Value Date    MAG 1.8 10/15/2017     Lab Results   Component Value Date    PHOS 4.9 (H) 10/16/2017       Creatinine   Date Value Ref Range Status   10/31/2017 2.35 (H) 0.52 - 1.04 mg/dL Final   10/30/2017 1.09 (H) 0.52 - 1.04 mg/dL Final   10/28/2017 2.15 (H) 0.52 - 1.04 mg/dL Final   10/27/2017 3.00 (H) 0.52 - 1.04 mg/dL Final   10/26/2017 2.22 (H) 0.52 - 1.04 mg/dL Final   10/24/2017 2.69 (H) 0.52 - 1.04 mg/dL Final       Attestation:  I have reviewed today's vital signs, notes, medications, labs and  imaging.     Hank Guerra MD

## 2017-10-31 NOTE — PROGRESS NOTES
Surgery Progress Note    S:  Comfortable   Tolerating diet    O: + BM  Vitals:  BP  Min: 128/65  Max: 173/87  Temp  Av.1  F (36.7  C)  Min: 97.4  F (36.3  C)  Max: 98.7  F (37.1  C)  Pulse  Av.1  Min: 68  Max: 99  I/O last 3 completed shifts:  In: 1685 [P.O.:100; I.V.:1585]  Out:  [Other:]    Physical Exam:  WD=A  Abd=soft, active BS      Assessment/Plan: Doing well.   Ileus resolved.  OK home per surgical standpoint                                 Diet as tolerated                                 RTO 2 weeks      Wm. MD Andrade

## 2017-10-31 NOTE — PROGRESS NOTES
[facility]    Hospitalist Progress Note    Assessment & Plan   Kathryn Banks is a 77 year old female who was admitted on 10/22/2017 with SBO and is doing well after lysis of adhesions, advanced to low fiber diet, bowels moved.  Dialysis tomorrow.     Impression:   Principal Problem:    SBO -- S/P Lysis of Adhes 10/24/17  Active Problems:    ANABEL on CPAP    Esophageal reflux    CAD - NSTEMI, CABG x 5 in 2006, angio 2013 patent grafts except occluded graft to PL    CRF (chronic renal failure), stage 4 (severe) (H)    Essential hypertension with goal blood pressure less than 140/90    Acute on chronic renal failure -- Dialysis started 10/2017 -- ?renal function improving    Anemia in chronic kidney disease    Paroxysmal atrial fibrillation -- on Warfarin, hold warfarin today    Postoperative ileus (H)    DM type 2 -- Hgb A1C 6.6 on 10/13/17    Restless leg syndrome      Plan:  Continue Postop care, warfarin 1 mg today, dialysis MWF.        DVT Prophylaxis: Pneumatic Compression Devices  Code Status: Prior    Disposition: Expected discharge in 1 day once bowel function returns to TCU -- would like to return to [facility].    Emmanuel Cordova MD  Pager [phone]  Cell Phone [phone]  Text Page (7am to 6pm)    Interval History   Feels good, tolerating clear liquids.  She believes she is urinating more.      Physical Exam   Temp: 98.1  F (36.7  C) Temp src: Oral BP: 137/74 Pulse: 82 Heart Rate: 73 Resp: 16 SpO2: 95 % O2 Device: None (Room air)    Vitals:    10/29/17 0600 10/30/17 0600 10/30/17 1123   Weight: 68.9 kg (151 lb 14.4 oz) 70.4 kg (155 lb 3.3 oz) 70.4 kg (155 lb 3.3 oz)     Vital Signs with Ranges  Temp:  [97.4  F (36.3  C)-99.1  F (37.3  C)] 98.1  F (36.7  C)  Pulse:  [82] 82  Heart Rate:  [73-88] 73  Resp:  [14-16] 16  BP: (120-137)/(65-76) 137/74  SpO2:  [92 %-95 %] 95 %  I/O last 3 completed shifts:  In: 1770.4 [P.O.:100; I.V.:1670.4]  Out: 2775 [Urine:175;  Other:2000; Stool:600]    Constitutional: Awake, alert, cooperative, no apparent distress  Respiratory: Clear to auscultation bilaterally, no crackles or wheezing  Cardiovascular: Regular rate and rhythm, normal S1 and S2, and no murmur noted  GI: some bowel sounds, soft, non-distended, slight tenderness in right and left lower quadrants, clean midline incision   Extrem: No calf tenderness, no ankle edema  Neuro: Ox3, no focal motor or sensory deficits    Medications     Warfarin Therapy Reminder         warfarin  1 mg Oral ONCE at 18:00     pantoprazole  40 mg Oral QAM     metoprolol  12.5 mg Oral BID     insulin aspart  1-7 Units Subcutaneous TID AC     insulin aspart  1-5 Units Subcutaneous At Bedtime     metoclopramide  5 mg Intravenous Q8H     pramipexole  0.125 mg Oral TID     fluticasone  1 spray Both Nostrils Daily     miconazole   Topical BID     amiodarone (PACERONE/CODARONE) tablet 200 mg  200 mg Oral BID     - MEDICATION INSTRUCTIONS for Dialysis Patients -   Does not apply See Admin Instructions       Data     Recent Labs  Lab 10/31/17  0745 10/30/17  1440 10/28/17  0725 10/27/17  1000 10/26/17  0734   WBC  --   --   --  6.7 5.9   HGB  --   --   --  9.2* 8.9*   MCV  --   --   --  91 90   PLT  --   --   --  320 310   INR 2.51* 2.50*  --   --   --     145* 141 140 139   POTASSIUM 3.6 2.7* 3.6 3.4 3.4   CHLORIDE 105 111* 106 108 103   CO2 27 26 29 26 29   BUN 4* 2* 4* 8 7   CR 2.35* 1.09* 2.15* 3.00* 2.22*   ANIONGAP 8 8 6 6 7   MALICK 7.0* 5.6* 7.5* 7.2* 7.3*   * 93 115* 115* 107*       Imaging:   No results found for this or any previous visit (from the past 24 hour(s)).

## 2017-10-31 NOTE — PLAN OF CARE
Problem: Patient Care Overview  Goal: Plan of Care/Patient Progress Review  Discharge Planner PT    Patient plan for discharge: TCU  Current status: Pt performed bed mobility with SBA and sit to/from stand transfers with CGA. Pt performed gait training x 150 ft total using wheeled walker and CGA with w/c follow.  Pt amb slowly.  Standing rest breaks needed. Pt too fatigued to amb further and required w/c ride back to her room.  Barriers to return to prior living situation: Decreased activity tolerance, level of assist, fall risk  Recommendations for discharge: TCU  Rationale for recommendations: Pt will benefit from continued skilled PT services to address deficits and progress independence and safety with mobility.       Entered by: Zahira Alcantar 10/31/2017 3:48 PM

## 2017-10-31 NOTE — PROGRESS NOTES
SW:    I: EVELIO following for discharge planning. SW in contact with [facility] this morning ([phone]) and confirmed that facility has bed for pt tomorrow 11/1/17. Pt was questioning whether she still had belongings at the facility (was admitted to [facility] from this location). SW inquired and per admissions pt family had picked up her belongings. SW to pass this on to pt. RN care coordinator contacting [facility] to discuss pt resumption of dialysis on Friday 11/3. Plan for pt discharge tomorrow following dialysis.    P: EVELIO to follow.    SHANTELL Vázquez, Stephens Memorial HospitalSW  Daytime (8:00am-4:30pm): [phone]  After-Hours EVELIO Pager (4:30pm-11:30pm): [phone]

## 2017-10-31 NOTE — PROVIDER NOTIFICATION
Notified Dr. Montes of pt's low potassium level with no protocol ordered. No new orders at this time.

## 2017-10-31 NOTE — PLAN OF CARE
Problem: Patient Care Overview  Goal: Plan of Care/Patient Progress Review  Discharge Planner OT   Patient plan for discharge: TCU  Current status: Provided handout and educated pt on UE AROM exercises. Pt concerned about decreased wrist function due to previous IV placement, so added wrist exercises and finger exercises to packet. Following demonstration, pt able to complete 5 reps of each exercise with SBA seated EOB. Pt SBA for toilet transfer with BSC and FWW and SBA for managing LB clothing and toileting hygiene. Pt SBA for completing oral facial g/h standing at sink with FWW. Pt expresses concern regarding d/c to TCU, stating she is scared she will not receive quality care. Provided reassurance.   Barriers to return to prior living situation: Decreased strength and activity tolerance, post-surgical precautions  Recommendations for discharge: TCU  Rationale for recommendations: Daily therapy to ensure safety and return to PLOF with ADLs/IADLs and functional mobility.        Entered by: Myrna Andino 10/31/2017 9:06 AM

## 2017-10-31 NOTE — PLAN OF CARE
Pt A&Ox4. VSS. 1 BM evening shift. Patient SBA of one with gait belt and walker. Dialysis completed today. Denies pain.  Abdominal incision open to air and CDI with steri strips. Full liquid diet. Shower provided with assist x1. Continue to monitor bowel function.

## 2017-10-31 NOTE — PLAN OF CARE
Problem: Patient Care Overview  Goal: Plan of Care/Patient Progress Review  Outcome: Improving  A/Ox4. VSS on RA. BS active, passing flatus. Denies pain. Abdominal incision, steri strips, no drainage. Tolerating full liquid diet. 1x loose stool, yellow-brown. Trace edema in LE. Possible d/c tomorrow.

## 2017-10-31 NOTE — PLAN OF CARE
Problem: Patient Care Overview  Goal: Plan of Care/Patient Progress Review  Outcome: No Change  A&O x4. LS are clear but diminished. Bowel sounds active and audible in all quadrants. VSS on RA. Dressing RASHIDA. Incision has no drainage, WDL, and closure with adhesive strips. Up with SBA with gait belt and a walke. Denies pain Plan discharge to TCU tomorrow following AM dialysis.

## 2017-11-01 VITALS
RESPIRATION RATE: 16 BRPM | HEIGHT: 64 IN | BODY MASS INDEX: 26.5 KG/M2 | SYSTOLIC BLOOD PRESSURE: 170 MMHG | HEART RATE: 78 BPM | TEMPERATURE: 97.9 F | DIASTOLIC BLOOD PRESSURE: 80 MMHG | OXYGEN SATURATION: 95 % | WEIGHT: 155.2 LBS

## 2017-11-01 LAB
ANION GAP SERPL CALCULATED.3IONS-SCNC: 7 MMOL/L (ref 3–14)
BUN SERPL-MCNC: 8 MG/DL (ref 7–30)
CALCIUM SERPL-MCNC: 7.1 MG/DL (ref 8.5–10.1)
CHLORIDE SERPL-SCNC: 108 MMOL/L (ref 94–109)
CO2 SERPL-SCNC: 27 MMOL/L (ref 20–32)
CREAT SERPL-MCNC: 2.85 MG/DL (ref 0.52–1.04)
GFR SERPL CREATININE-BSD FRML MDRD: 16 ML/MIN/1.7M2
GLUCOSE BLDC GLUCOMTR-MCNC: 138 MG/DL (ref 70–99)
GLUCOSE BLDC GLUCOMTR-MCNC: 87 MG/DL (ref 70–99)
GLUCOSE SERPL-MCNC: 144 MG/DL (ref 70–99)
INR PPP: 2.25 (ref 0.86–1.14)
POTASSIUM SERPL-SCNC: 4.2 MMOL/L (ref 3.4–5.3)
SODIUM SERPL-SCNC: 142 MMOL/L (ref 133–144)

## 2017-11-01 PROCEDURE — 36415 COLL VENOUS BLD VENIPUNCTURE: CPT | Performed by: INTERNAL MEDICINE

## 2017-11-01 PROCEDURE — 99207 ZZC CDG-CODE INCORRECT PER BILLING BASED ON TIME: CPT | Performed by: INTERNAL MEDICINE

## 2017-11-01 PROCEDURE — 90937 HEMODIALYSIS REPEATED EVAL: CPT

## 2017-11-01 PROCEDURE — 25000132 ZZH RX MED GY IP 250 OP 250 PS 637: Mod: GY | Performed by: INTERNAL MEDICINE

## 2017-11-01 PROCEDURE — A9270 NON-COVERED ITEM OR SERVICE: HCPCS | Mod: GY | Performed by: INTERNAL MEDICINE

## 2017-11-01 PROCEDURE — 25000128 H RX IP 250 OP 636: Performed by: INTERNAL MEDICINE

## 2017-11-01 PROCEDURE — 63400005 ZZH RX 634: Performed by: INTERNAL MEDICINE

## 2017-11-01 PROCEDURE — 00000146 ZZHCL STATISTIC GLUCOSE BY METER IP

## 2017-11-01 PROCEDURE — 80048 BASIC METABOLIC PNL TOTAL CA: CPT | Performed by: INTERNAL MEDICINE

## 2017-11-01 PROCEDURE — 85610 PROTHROMBIN TIME: CPT | Performed by: INTERNAL MEDICINE

## 2017-11-01 PROCEDURE — 99239 HOSP IP/OBS DSCHRG MGMT >30: CPT | Performed by: INTERNAL MEDICINE

## 2017-11-01 RX ORDER — HEPARIN SODIUM 1000 [USP'U]/ML
500 INJECTION, SOLUTION INTRAVENOUS; SUBCUTANEOUS
Status: DISCONTINUED | OUTPATIENT
Start: 2017-11-01 | End: 2017-11-01

## 2017-11-01 RX ORDER — HEPARIN SODIUM 1000 [USP'U]/ML
500 INJECTION, SOLUTION INTRAVENOUS; SUBCUTANEOUS CONTINUOUS
Status: DISCONTINUED | OUTPATIENT
Start: 2017-11-01 | End: 2017-11-01

## 2017-11-01 RX ORDER — ALBUMIN (HUMAN) 12.5 G/50ML
50 SOLUTION INTRAVENOUS
Status: DISCONTINUED | OUTPATIENT
Start: 2017-11-01 | End: 2017-11-01

## 2017-11-01 RX ADMIN — AMIODARONE HYDROCHLORIDE 200 MG: 200 TABLET ORAL at 08:23

## 2017-11-01 RX ADMIN — FLUTICASONE PROPIONATE 1 SPRAY: 50 SPRAY, METERED NASAL at 08:24

## 2017-11-01 RX ADMIN — Medication 12.5 MG: at 13:15

## 2017-11-01 RX ADMIN — SODIUM CHLORIDE 250 ML: 9 INJECTION, SOLUTION INTRAVENOUS at 09:55

## 2017-11-01 RX ADMIN — LORAZEPAM 0.25 MG: 2 INJECTION INTRAMUSCULAR; INTRAVENOUS at 09:13

## 2017-11-01 RX ADMIN — PANTOPRAZOLE SODIUM 40 MG: 40 TABLET, DELAYED RELEASE ORAL at 08:23

## 2017-11-01 RX ADMIN — EPOETIN ALFA 4000 UNITS: 4000 SOLUTION INTRAVENOUS; SUBCUTANEOUS at 09:55

## 2017-11-01 RX ADMIN — PRAMIPEXOLE DIHYDROCHLORIDE 0.12 MG: 0.12 TABLET ORAL at 08:23

## 2017-11-01 NOTE — PLAN OF CARE
Problem: Patient Care Overview  Goal: Plan of Care/Patient Progress Review  Physical Therapy: Pt in dialysis in AM and per chart, patient is discharging to TCU this afternoon. Will cancel PT session for today and defer further treatment to next level of care.  Physical Therapy Discharge Summary     Reason for therapy discharge:    Discharged to transitional care facility.     Progress towards therapy goal(s). See goals on Care Plan in [facility] electronic health record for goal details.  Goals partially met.  Barriers to achieving goals:   discharge from facility.     Therapy recommendation(s):    Continued therapy is recommended.  Rationale/Recommendations:  To further increase independence with mobility.

## 2017-11-01 NOTE — PROGRESS NOTES
Surgery Progress Note    S: No complaints.  No place in TCU yesterday.  Tolearting diet    O:   Vitals:  BP  Min: 120/67  Max: 151/81  Temp  Av.4  F (36.9  C)  Min: 97.9  F (36.6  C)  Max: 99.1  F (37.3  C)  Pulse  Av  Min: 78  Max: 78  I/O last 3 completed shifts:  In: 325.4 [P.O.:240; I.V.:85.4]  Out: 1225 [Urine:275; Other:350; Stool:600]    Physical Exam:  Wd=A   + BM      Assessment/Plan: Doing well.  Dialysis this AM.  TCU later today.      Wm. Andrade MD

## 2017-11-01 NOTE — PROGRESS NOTES
Assessment and Plan:   ESRD: dialysis today with R CVC and 400 BFR. UF 2-3 L. 3K and 33 HCO3. I called the [facility] unit she will run at in [place] and gave them orders.             Interval History:   Hypertension  Anemia : on EPO     Afib: amiodarone, metoprolol and coumadin.              Review of Systems:   No sx on dialysis.           Medications:       epoetin hira (EPOGEN,PROCRIT) inj ESRD  4,000 Units Intravenous See Admin Instructions     heparin (porcine)  500 Units Hemodialysis Machine Once in dialysis     pantoprazole  40 mg Oral QAM     metoprolol  12.5 mg Oral BID     insulin aspart  1-7 Units Subcutaneous TID AC     insulin aspart  1-5 Units Subcutaneous At Bedtime     metoclopramide  5 mg Intravenous Q8H     pramipexole  0.125 mg Oral TID     fluticasone  1 spray Both Nostrils Daily     miconazole   Topical BID     amiodarone (PACERONE/CODARONE) tablet 200 mg  200 mg Oral BID     - MEDICATION INSTRUCTIONS for Dialysis Patients -   Does not apply See Admin Instructions       heparin (porcine)       Warfarin Therapy Reminder       Current active medications and PTA medications reviewed, see medication list for details.            Physical Exam:   Vitals were reviewed  Patient Vitals for the past 24 hrs:   BP Temp Temp src Pulse Heart Rate Resp SpO2   11/01/17 1015 (P) 166/80 - - - (P) 64 - -   11/01/17 0945 144/77 - - - 66 - -   11/01/17 0915 161/79 97.7  F (36.5  C) Oral - 73 16 95 %   11/01/17 0815 151/86 97.8  F (36.6  C) Axillary - 80 16 94 %   11/01/17 0000 151/81 97.9  F (36.6  C) Oral - 74 16 94 %   10/31/17 1902 141/74 98.5  F (36.9  C) Oral 78 79 16 97 %   10/31/17 1538 137/74 98.1  F (36.7  C) Oral - 73 16 95 %   10/31/17 1100 131/76 98.4  F (36.9  C) Oral - 76 16 95 %       Temp:  [97.7  F (36.5  C)-98.5  F (36.9  C)] 97.7  F (36.5  C)  Pulse:  [78] 78  Heart Rate:  [64-80] (P) 64  Resp:  [16] 16  BP: (131-161)/(74-86) (P) 166/80  SpO2:  [94 %-97 %] 95 %    Temperatures:  Current  - Temp: 97.7  F (36.5  C); Max - Temp  Av.1  F (36.7  C)  Min: 97.7  F (36.5  C)  Max: 98.5  F (36.9  C)  Respiration range: Resp  Av  Min: 16  Max: 16  Pulse range: Pulse  Av  Min: 78  Max: 78  Blood pressure range: Systolic (24hrs), Av , Min:131 , Max:166   ; Diastolic (24hrs), Av, Min:74, Max:86    Pulse oximetry range: SpO2  Av %  Min: 94 %  Max: 97 %    I/O last 3 completed shifts:  In: 325.4 [P.O.:240; I.V.:85.4]  Out: 1225 [Urine:275; Other:350; Stool:600]      Intake/Output Summary (Last 24 hours) at 17 1030  Last data filed at 17 0512   Gross per 24 hour   Intake              240 ml   Output             1125 ml   Net             -885 ml       Alert  R CVC with no redness or tenderness             Wt Readings from Last 4 Encounters:   10/30/17 70.4 kg (155 lb 3.3 oz)   10/18/17 62.2 kg (137 lb 2 oz)   10/01/17 77.3 kg (170 lb 8 oz)   17 77.1 kg (170 lb)          Data:          Lab Results   Component Value Date     2017     10/31/2017     10/30/2017    Lab Results   Component Value Date    CHLORIDE 108 2017    CHLORIDE 105 10/31/2017    CHLORIDE 111 10/30/2017    Lab Results   Component Value Date    BUN 8 2017    BUN 4 10/31/2017    BUN 2 10/30/2017      Lab Results   Component Value Date    POTASSIUM 4.2 2017    POTASSIUM 3.6 10/31/2017    POTASSIUM 2.7 10/30/2017    Lab Results   Component Value Date    CO2 27 2017    CO2 27 10/31/2017    CO2 26 10/30/2017    Lab Results   Component Value Date    CR 2.85 2017    CR 2.35 10/31/2017    CR 1.09 10/30/2017        Recent Labs   Lab Test  10/27/17   1000  10/26/17   0734  10/24/17   0739   WBC  6.7  5.9  3.6*   HGB  9.2*  8.9*  9.1*   HCT  30.2*  29.4*  30.0*   MCV  91  90  91   PLT  320  310  311     Recent Labs   Lab Test  10/22/17   0305  10/12/17   0456  10/11/17   0420   AST  14  9  12   ALT  9  8  9   ALKPHOS  180*  128  145   BILITOTAL  0.3  0.4  0.3        Recent Labs   Lab Test  10/15/17   0700  10/10/17   0409  10/09/17   0441   MAG  1.8  1.7  2.1     Recent Labs   Lab Test  10/16/17   0545  10/15/17   0700  10/12/17   1600   PHOS  4.9*  3.8  3.9     Recent Labs   Lab Test  11/01/17   0915  10/31/17   0745  10/30/17   1440   MALICK  7.1*  7.0*  5.6*       Lab Results   Component Value Date    MALICK 7.1 (L) 11/01/2017     Lab Results   Component Value Date    WBC 6.7 10/27/2017    HGB 9.2 (L) 10/27/2017    HCT 30.2 (L) 10/27/2017    MCV 91 10/27/2017     10/27/2017     Lab Results   Component Value Date     11/01/2017    POTASSIUM 4.2 11/01/2017    CHLORIDE 108 11/01/2017    CO2 27 11/01/2017     (H) 11/01/2017     Lab Results   Component Value Date    BUN 8 11/01/2017    CR 2.85 (H) 11/01/2017     Lab Results   Component Value Date    MAG 1.8 10/15/2017     Lab Results   Component Value Date    PHOS 4.9 (H) 10/16/2017       Creatinine   Date Value Ref Range Status   11/01/2017 2.85 (H) 0.52 - 1.04 mg/dL Final   10/31/2017 2.35 (H) 0.52 - 1.04 mg/dL Final   10/30/2017 1.09 (H) 0.52 - 1.04 mg/dL Final   10/28/2017 2.15 (H) 0.52 - 1.04 mg/dL Final   10/27/2017 3.00 (H) 0.52 - 1.04 mg/dL Final   10/26/2017 2.22 (H) 0.52 - 1.04 mg/dL Final       Attestation:  I have reviewed today's vital signs, notes, medications, labs and imaging.  Seen on dialysis.      Hank Guerra MD

## 2017-11-01 NOTE — PROGRESS NOTES
Potassium   Date Value Ref Range Status   11/01/2017 4.2 3.4 - 5.3 mmol/L Final   ]  Lab Results   Component Value Date    HGB 9.2 10/27/2017     Weight: 70.4 kg (155 lb 3.3 oz)  POST WT  DIALYSIS PROCEDURE NOTE  Hepatitis status of previous patient on machine log was checked and ok to use with this patient hepatitis status.  Patient dialyzed for 3 hrs on a 3 K bath with a  net fluid removal of 3L.  A BFR of 400 ml/min was obtained via RIJ.  Patient was seen by Dr. Guerra during treatment.  Total heparin received during treatment::  0units.  Heparin instilled in both ports post run.  Meds given: Epogen. Complications: None.   Procedure and ESRD teaching done and questions answered.  See flowsheet in EPIC for further details and post assessment.  Machine water alarm in place and functioning.  HEPATITIS B SURFACE ANTIGEN Non-Reactive Date 10/6/17   HEPATITIS B SURFACE ANTIBODY Immune DATE 10/16/17  CHLORINE AND CHLORAMINES CHECK on WATER SYSTEM EVERY 4 hours.   PT returned via WC  Catheter Access: Aseptic prep done for both on/off.  Report received from: KARLEE Dang RN  Report given to: KARLEE Dang RN  Outpatient Dialysis at [facility]

## 2017-11-01 NOTE — PROGRESS NOTES
SW:    I: EVELIO following for discharge planning. Pt will discharge to [facility] TCU today. SW met with pt this morning in dialysis to discuss transportation. Pt shared that her  will be at [facility] around 1100 today and plan for discharge after dialysis. SW and pt discussed a 1400 discharge. Pt discussed wanted to stop at home to gather some of her belongings; SW strongly recommended driving directly to TCU and having her family collect her belongings to bring to her at TCU. SW to fax discharge orders to [phone] when available, updated admissions on discharge timing [phone].     P: [facility]. Pt, facility, and bedside RN updated.    ADDENDUM: Discharge orders faxed to facility admissions.    SHANTELL Vázquez, [facility] HospitalSW  Daytime (8:00am-4:30pm): [phone]  After-Hours EVELIO Pager (4:30pm-11:30pm): [phone]

## 2017-11-01 NOTE — PLAN OF CARE
Problem: Patient Care Overview  Goal: Plan of Care/Patient Progress Review  Outcome: Improving  VSS, LS clear, BS active and passing gas, incisions intact, tolerating reg diet, voiding, up SBA, plan to d/c to TCU after dialysis today

## 2017-11-01 NOTE — PLAN OF CARE
Problem: Patient Care Overview  Goal: Plan of Care/Patient Progress Review  Occupational Therapy Discharge Summary     Reason for therapy discharge:    Discharged to transitional care facility.     Progress towards therapy goal(s). See goals on Care Plan in [facility] electronic health record for goal details.  Goals not met.  Barriers to achieving goals:   discharge from facility.     Therapy recommendation(s):    Continued therapy is recommended.  Rationale/Recommendations:  To maximize I in ADL/IADL.

## 2017-11-01 NOTE — PLAN OF CARE
Problem: Patient Care Overview  Goal: Plan of Care/Patient Progress Review  Outcome: Improving  VSS on RA. SBA, walker. Abdominal incision, RASHIDA, no drainage. Tolerating mod carb diet. Adequate output. Possible d/c tomorrow.

## 2017-11-01 NOTE — PLAN OF CARE
Problem: Patient Care Overview  Goal: Plan of Care/Patient Progress Review  Outcome: Completed Date Met:  11/01/17  Patient discharged to TCU with  as . Patients  in room for discharge teaching. VSS, denies pain. Patient had dialysis this morning. Tolerating mod carb diet. Incision CDI.

## 2017-11-02 ENCOUNTER — OFFICE VISIT (OUTPATIENT)
Dept: VASCULAR SURGERY | Facility: CLINIC | Age: 77
End: 2017-11-02

## 2017-11-02 VITALS
HEART RATE: 75 BPM | OXYGEN SATURATION: 96 % | RESPIRATION RATE: 16 BRPM | SYSTOLIC BLOOD PRESSURE: 141 MMHG | DIASTOLIC BLOOD PRESSURE: 86 MMHG

## 2017-11-02 DIAGNOSIS — N18.6 ESRD (END STAGE RENAL DISEASE) ON DIALYSIS (H): Primary | ICD-10-CM

## 2017-11-02 DIAGNOSIS — Z99.2 ESRD (END STAGE RENAL DISEASE) ON DIALYSIS (H): Primary | ICD-10-CM

## 2017-11-02 ASSESSMENT — PAIN SCALES - GENERAL: PAINLEVEL: NO PAIN (0)

## 2017-11-02 NOTE — MR AVS SNAPSHOT
After Visit Summary   11/2/2017    Kathryn Banks    MRN: [id number]           Patient Information     Date Of Birth          1940        Visit Information        Provider Department      11/2/2017 2:00 PM Cassie Cardenas MD [facility]        Today's Diagnoses     ESRD (end stage renal disease) on dialysis (H)    -  1       Follow-ups after your visit        Your next 10 appointments already scheduled     Dec 05, 2017 12:30 PM CST   LAB with LAB FIRST FLOOR [facility] ([facility])    [address] [place] [address] [id number] [phone]           Please do not eat 10-12 hours before your appointment if you are coming in fasting for labs on lipids, cholesterol, or glucose (sugar). This does not apply to pregnant women. Water, hot tea and black coffee (with nothing added) are okay. Do not drink other fluids, diet soda or chew gum.            Dec 05, 2017  1:00 PM CST   Return Visit with Vibha Barfield MD   [facility] ([facility]    [address] [place] [address] [id number] [phone]              Who to contact     Please call your clinic at [phone] to:    Ask questions about your health    Make or cancel appointments    Discuss your medicines    Learn about your test results    Speak to your doctor   If you have compliments or concerns about an experience at your clinic, or if you wish to file a complaint, please contact [facility] Patient Relations at [phone] or email us at [email or web address]         Additional Information About Your Visit        [employer] Information     Zyrra is an electronic gateway that provides easy, online access to your medical records. With Zyrra, you can request a clinic appointment, read your test results, renew a prescription or communicate with your care team.     To sign up for Zyrra visit the  website at [email or web address]   You will be asked to enter the access code listed below, as well as some personal information. Please follow the directions to create your username and password.     Your access code is: [email or web address]  Expires: 2017  4:04 PM     Your access code will  in 90 days. If you need help or a new code, please contact your [facility] or call [phone] for assistance.        Care EveryWhere ID     This is your Care EveryWhere ID. This could be used by other organizations to access your [facility] medical records  [id number]        Your Vitals Were     Pulse Respirations Pulse Oximetry Breastfeeding?          75 16 96% No         Blood Pressure from Last 3 Encounters:   17 141/86   17 170/80   10/20/17 117/64    Weight from Last 3 Encounters:   10/30/17 155 lb 3.3 oz   10/18/17 137 lb 2 oz   10/01/17 170 lb 8 oz              Today, you had the following     No orders found for display       Primary Care Provider Office Phone # Fax #    Dheeraj Jj [phone]       [facility] [address] [place] [address]        Equal Access to Services     CAROLINE BLAND AH: Hadii aad ku hadasho Soomaali, waaxda luqadaha, qaybta kaalmada adeegyada, waxay idiin hayaan adeeg veronika lajosé miguel ah. So [facility] [phone].    ATENCIÓN: Si habla español, tiene a medrano disposición servicios gratuitos de asistencia lingüística. Llame al [phone].    We comply with applicable federal civil rights laws and [place] laws. We do not discriminate on the basis of race, color, national origin, age, disability, sex, sexual orientation, or gender identity.            Thank you!     Thank you for choosing [facility]  for your care. Our goal is always to provide you with excellent care. Hearing back from our patients is one way we can continue to improve our services. Please take a few minutes to complete the written  survey that you may receive in the mail after your visit with us. Thank you!             Your Updated Medication List - Protect others around you: Learn how to safely use, store and throw away your medicines at www.disposemymeds.org.          This list is accurate as of: 11/2/17 10:03 PM.  Always use your most recent med list.                   Brand Name Dispense Instructions for use Diagnosis    ACETAMINOPHEN PO      Take 650 mg by mouth every 4 hours as needed for pain For pain 1-5 out of 10        AMIODARONE HCL PO      Take 200 mg by mouth 2 times daily        calcium acetate 667 MG Caps capsule    PHOSLO    180 capsule    Take 1 capsule (667 mg) by mouth 3 times daily (with meals)    Chronic kidney disease, unspecified CKD stage       cholecalciferol 5000 UNITS Caps      Take 1 capsule (5,000 Units) by mouth daily    CKD (chronic kidney disease) stage 5, GFR less than 15 ml/min (H)       famotidine 20 MG tablet    PEPCID    60 tablet    Take 1 tablet (20 mg) by mouth 2 times daily as needed    Gastroesophageal reflux disease without esophagitis       fluticasone 50 MCG/ACT spray    FLONASE    1 Bottle    Spray 1 spray into both nostrils daily    Nasal congestion       hypromellose-dextran Soln ophthalmic solution      Place 1 drop into both eyes 3 times daily as needed for dry eyes    Dry eyes       ipratropium - albuterol 0.5 mg/2.5 mg/3 mL 0.5-2.5 (3) MG/3ML neb solution    DUONEB     Take 1 vial by nebulization 3 times daily        LORazepam 0.5 MG tablet    ATIVAN    20 tablet    Take 0.5 tablets (0.25 mg) by mouth every 8 hours as needed for anxiety    Anxiety       metoprolol 25 MG tablet    LOPRESSOR    60 tablet    Take 0.5 tablets (12.5 mg) by mouth 2 times daily    Atrial flutter, unspecified type (H)       nitroGLYcerin 0.4 MG sublingual tablet    NITROSTAT    25 tablet    Place 1 tablet (0.4 mg) under the tongue every 5 minutes as needed for chest pain    Hx of non-ST elevation myocardial  infarction (NSTEMI), Atherosclerosis of native coronary artery of native heart without angina pectoris, Postsurgical aortocoronary bypass status       nystatin [id number] UNIT/GM Powd    MYCOSTATIN     Apply topically 2 times daily as needed        order for DME     1 Device    Equipment being ordered: cpap machine, mask, humidifier, tubing and filters    ANABEL (obstructive sleep apnea)       pentoxifylline 400 MG CR tablet    TRENtal     Take 1 tablet (400 mg) by mouth daily    Venous stasis ulcer (H)       pramipexole 0.125 MG tablet    MIRAPEX    90 tablet    Take 1 tablet (0.125 mg) by mouth 3 times daily    Restless leg syndrome       pravastatin 40 MG tablet    PRAVACHOL    90 tablet    Take 1 tablet (40 mg) by mouth daily    Hx of non-ST elevation myocardial infarction (NSTEMI), Atherosclerosis of native coronary artery of native heart without angina pectoris, Type 2 diabetes mellitus with other circulatory complications       warfarin 2 MG tablet    COUMADIN    30 tablet    1 mg daily for aflut and adjust for desired INR 2.0 to 3.0    Atrial flutter, unspecified type (H)

## 2017-11-02 NOTE — PROGRESS NOTES
Vascular Surgery Clinic Note - New Patient       Date: 11/02/2017       Time:  2:44P    Mrs. Banks is a 76 yo right handed female who currently resides at a [facility]. She has a history of atrial fibrillation/atrial flutter (on coumadin), DMII, HTNSA with history of CAD s/p CABG in 2006 found to have acute on chronic renal failure after she was admitted for a bowel obtstruction.   It should be noted she has a history of rounx en Y byapss with remote C section. Her care was complicated by a postoperative ileus and renal failure requiring dialysis.   She currently makes about 3/4 of a cup of urine per day. She dialyzes through a right permacath.  Mrs. Michel is referred to vascular surgery for fistula creation.     Past Medical History:   Diagnosis Date     Carpal tunnel syndrome      Coronary atherosclerosis of native coronary artery 2006    5 vessel CABG     OBSTRUCTIVE SLEEP APNEA     using CPAP     Osteoarthrosis, unspecified whether generalized or localized, unspecified site     generalized arthritis, particularly in her hands and feet         Other and combined forms of senile cataract 2000    Bilateral     Other and unspecified hyperlipidemia      RESTLESS LEGS SYNDROME      TENOSYNOV HAND/WRIST NEC 6/30/2006     Type II or unspecified type diabetes mellitus without mention of complication, not stated as uncontrolled 2001    Diabetes mellitus/pt is diet controlled with weight loss     Typical atrial flutter (H) 01/17/2007    ablation 6/7/2017     Unspecified essential hypertension      Past Surgical History:   Procedure Laterality Date     ANGIOPLASTY       C APPENDECTOMY       C CABG, VEIN, FIVE  12/06    SVG x 4 and LIMA to LAD     C SOTO W/O FACETEC FORAMOT/DSKC 1/2 VRT SEG, LUMBAR  1968     C REMV CATARACT INTRACAP,INSERT LENS      Bilateral     C TOTAL ABDOM HYSTERECTOMY  1995     - fibroids     C VAGOTOMY/PYLOROPLASTY,SELECT  1970     COLONOSCOPY  12/3/2007     COLONOSCOPY  1/17/2014    Procedure:  COLONOSCOPY;  Colonoscopy;  Surgeon: Zack Stearns MD;  Location: [facility]     CYSTOSCOPY  11/25/09    [facility]     ENDOSCOPY  03/21/2000    Upper GI     HC COLONOSCOPY THRU STOMA, DIAGNOSTIC  10/7/04    poor prep, repeat in 2-3 years     HC COLONOSCOPY W/WO BRUSH/WASH  10/31/07    Repeat-poor quality prep     HC REMOVAL OF OVARY/TUBE(S)      Salpingo-Oophorectomy, Unilateral     HC REVISE MEDIAN N/CARPAL TUNNEL SURG      Carpal tunnel release     HC UGI ENDOSCOPY DIAG W BIOPSY  10/31/07     HC UGI ENDOSCOPY, SIMPLE EXAM  12/3/2007     LAPAROTOMY, LYSIS ADHESIONS, COMBINED N/A 10/24/2017    Procedure: COMBINED LAPAROTOMY, LYSIS ADHESIONS;  REPAIR FOCAL ILEAL SMALL BOWEL PERFERATION, LYSIS OF ADHESIONS.;  Surgeon: Rashard Zafar MD;  Location: [facility]     OPEN REDUCTION INTERNAL FIXATION HIP NAILING Left 7/3/2016    Procedure: OPEN REDUCTION INTERNAL FIXATION HIP NAILING;  Surgeon: Lake Schulz MD;  Location: [facility] OR     /86 (BP Location: Left arm)  Pulse 75  Resp 16  SpO2 96%  Breastfeeding? No    General: Edlery  female sitting upright in wheel chaer  Co: RRRR  Pulm: Unlabored breathing   UE: Bilaterally warm and weel perfused. Bounding radial artery pulse.  Palmar arch is intact.     Assessment:  Cephalic vein is suitable for immediate flexine graft vs. fistula creation      Plan     1. No blood draws or needle puncutirs of the lefht upper eexttremity   2. Will plan fo fistula vs shrot segment  3. Will follow with date for procedure     Jesus Oleary MD   Vascular Surgery Fellow

## 2017-11-02 NOTE — LETTER
11/2/2017       RE: Kathryn Banks  [address] [facility] [address]     Dear Colleague,    Thank you for referring your patient, Kathryn Banks, to the [facility] at [facility]. Please see a copy of my visit note below.    Vascular Surgery Clinic Note - New Patient       Date: 11/02/2017       Time:  2:44P    Mrs. Banks is a 78 yo right handed female who currently resides at a [facility]. She has a history of atrial fibrillation/atrial flutter (on coumadin), DMII, HTNSA with history of CAD s/p CABG in 2006 found to have acute on chronic renal failure after she was admitted for a bowel obtstruction.   It should be noted she has a history of rounx en Y byapss with remote C section. Her care was complicated by a postoperative ileus and renal failure requiring dialysis.   She currently makes about 3/4 of a cup of urine per day. She dialyzes through a right permacath.  Mrs. Michel is referred to vascular surgery for fistula creation.     Past Medical History:   Diagnosis Date     Carpal tunnel syndrome      Coronary atherosclerosis of native coronary artery 2006    5 vessel CABG     OBSTRUCTIVE SLEEP APNEA     using CPAP     Osteoarthrosis, unspecified whether generalized or localized, unspecified site     generalized arthritis, particularly in her hands and feet         Other and combined forms of senile cataract 2000    Bilateral     Other and unspecified hyperlipidemia      RESTLESS LEGS SYNDROME      TENOSYNOV HAND/WRIST NEC 6/30/2006     Type II or unspecified type diabetes mellitus without mention of complication, not stated as uncontrolled 2001    Diabetes mellitus/pt is diet controlled with weight loss     Typical atrial flutter (H) 01/17/2007    ablation 6/7/2017     Unspecified essential hypertension      Past Surgical History:   Procedure Laterality Date     ANGIOPLASTY       C APPENDECTOMY       C CABG, VEIN, FIVE  12/06    SVG x 4 and LIMA  to LAD     C SOTO W/O FACETEC FORAMOT/[facility]KC 1/2 VRT SEG, LUMBAR  1968     C REMV CATARACT INTRACAP,INSERT LENS      Bilateral     C TOTAL ABDOM HYSTERECTOMY  1995     - fibroids     C VAGOTOMY/PYLOROPLASTY,SELECT  1970     COLONOSCOPY  12/3/2007     COLONOSCOPY  1/17/2014    Procedure: COLONOSCOPY;  Colonoscopy;  Surgeon: Zack Stearns MD;  Location: [facility]     CYSTOSCOPY  11/25/09    [place]     ENDOSCOPY  03/21/2000    [facility]     HC COLONOSCOPY THRU STOMA, DIAGNOSTIC  10/7/04    poor prep, repeat in 2-3 years     HC COLONOSCOPY W/WO BRUSH/WASH  10/31/07    Repeat-poor quality prep     HC REMOVAL OF OVARY/TUBE(S)      Salpingo-Oophorectomy, Unilateral     HC REVISE MEDIAN N/CARPAL TUNNEL SURG      Carpal tunnel release     HC UGI ENDOSCOPY DIAG W BIOPSY  10/31/07     HC UGI ENDOSCOPY, SIMPLE EXAM  12/3/2007     LAPAROTOMY, LYSIS ADHESIONS, COMBINED N/A 10/24/2017    Procedure: COMBINED LAPAROTOMY, LYSIS ADHESIONS;  REPAIR FOCAL ILEAL SMALL BOWEL PERFERATION, LYSIS OF ADHESIONS.;  Surgeon: Rashard Zafar MD;  Location: [facility]     OPEN REDUCTION INTERNAL FIXATION HIP NAILING Left 7/3/2016    Procedure: OPEN REDUCTION INTERNAL FIXATION HIP NAILING;  Surgeon: Lake Schulz MD;  Location: [facility]     /86 (BP Location: Left arm)  Pulse 75  Resp 16  SpO2 96%  Breastfeeding? No    General: Edlery  female sitting upright in wheel chaer  Co: RRRR  Pulm: Unlabored breathing   UE: Bilaterally warm and weel perfused. Bounding radial artery pulse.  Palmar arch is intact.     Assessment:  Cephalic vein is suitable for immediate flexine graft vs. fistula creation      Plan     1. No blood draws or needle puncutirs of the [facility] upper eexttremity   2. Will plan fo fistula vs shrot segment  3. Will follow with date for procedure     Jesus Oleary MD   Vascular Surgery Fellow      I saw the patient with the resident.  I agree with the resident note and plan of care attached.  In summary, this was a  patient with progressive renal failure who had been sent to me for evaluation of a fistula prior to going on to dialysis.  Before she could see me she had an emergency admission to [facility] for a small bowel obstruction and perforation with sepsis.  She was operated on successfully, but went on to require hemodialysis which she is receiving via a right IJ permcath.  She now needs a more permanent access.  With the intent of facilitating removal of the permcath before two months I will consider a short segment AV graft to her native cephalic vein on the left.  This vein appears adequate in caliber for a native fistula. While her prior nephrologist Dr Barfield has been supportive of this, I need to reach out to her future nephrologist now that she is changing dialysis centers to make sure that they would support this approach over the more standard approach of placing a fistula and accepting the maturation time and delay to removal of the permcath. Surgery in the next month or so, after she has some recovery from her recent surgery and admission (she was only discharged yesterday).       Cassie Cardenas MD     I spent >50% of this 45 min visit in counseling    Again, thank you for allowing me to participate in the care of your patient.      Sincerely,    Cassie Cardenas MD

## 2017-11-02 NOTE — NURSING NOTE
Chief Complaint   Patient presents with     Consult     COnsult AV fistula        Vitals:    11/02/17 1358   BP: 141/86   BP Location: Left arm   Pulse: 75   Resp: 16   SpO2: 96%       There is no height or weight on file to calculate BMI.              Simin Hernandez LPN

## 2017-11-03 ENCOUNTER — TELEPHONE (OUTPATIENT)
Dept: PHARMACY | Facility: CLINIC | Age: 77
End: 2017-11-03

## 2017-11-03 ENCOUNTER — NURSING HOME VISIT (OUTPATIENT)
Dept: GERIATRICS | Facility: CLINIC | Age: 77
End: 2017-11-03

## 2017-11-03 VITALS
DIASTOLIC BLOOD PRESSURE: 85 MMHG | RESPIRATION RATE: 20 BRPM | TEMPERATURE: 98.6 F | WEIGHT: 151 LBS | SYSTOLIC BLOOD PRESSURE: 150 MMHG | BODY MASS INDEX: 25.92 KG/M2 | HEART RATE: 83 BPM

## 2017-11-03 DIAGNOSIS — Z53.9 ERRONEOUS ENCOUNTER--DISREGARD: Primary | ICD-10-CM

## 2017-11-03 NOTE — TELEPHONE ENCOUNTER
MT anemia clinic. She is on dialysis. Notified [facility] where she was going to have aranesp injections.    Yesy Samaniego, PharmD, [facility]  [phone]  November 3, 2017     Cc:  Vibha Barfield MD

## 2017-11-03 NOTE — PROGRESS NOTES
I saw the patient with the resident.  I agree with the resident note and plan of care attached.  In summary, this was a patient with progressive renal failure who had been sent to me for evaluation of a fistula prior to going on to dialysis.  Before she could see me she had an emergency admission to [facility] for a small bowel obstruction and perforation with sepsis.  She was operated on successfully, but went on to require hemodialysis which she is receiving via a right IJ permcath.  She now needs a more permanent access.  With the intent of facilitating removal of the permcath before two months I will consider a short segment AV graft to her native cephalic vein on the left.  This vein appears adequate in caliber for a native fistula. While her prior nephrologist Dr Barfield has been supportive of this, I need to reach out to her future nephrologist now that she is changing dialysis centers to make sure that they would support this approach over the more standard approach of placing a fistula and accepting the maturation time and delay to removal of the permcath. Surgery in the next month or so, after she has some recovery from her recent surgery and admission (she was only discharged yesterday).       Cassie Cardenas MD     I spent >50% of this 45 min visit in counseling

## 2017-11-06 ENCOUNTER — NURSING HOME VISIT (OUTPATIENT)
Dept: GERIATRICS | Facility: CLINIC | Age: 77
End: 2017-11-06
Payer: COMMERCIAL

## 2017-11-06 ENCOUNTER — CARE COORDINATION (OUTPATIENT)
Dept: CARE COORDINATION | Facility: CLINIC | Age: 77
End: 2017-11-06

## 2017-11-06 VITALS
TEMPERATURE: 96.8 F | RESPIRATION RATE: 18 BRPM | OXYGEN SATURATION: 98 % | DIASTOLIC BLOOD PRESSURE: 83 MMHG | HEART RATE: 80 BPM | SYSTOLIC BLOOD PRESSURE: 161 MMHG

## 2017-11-06 DIAGNOSIS — I25.10 ATHEROSCLEROSIS OF NATIVE CORONARY ARTERY OF NATIVE HEART WITHOUT ANGINA PECTORIS: ICD-10-CM

## 2017-11-06 DIAGNOSIS — G47.33 OSA ON CPAP: ICD-10-CM

## 2017-11-06 DIAGNOSIS — R60.0 BILATERAL LEG EDEMA: ICD-10-CM

## 2017-11-06 DIAGNOSIS — I48.0 PAROXYSMAL ATRIAL FIBRILLATION (H): ICD-10-CM

## 2017-11-06 DIAGNOSIS — R53.81 PHYSICAL DECONDITIONING: ICD-10-CM

## 2017-11-06 DIAGNOSIS — N18.6 ESRD (END STAGE RENAL DISEASE) ON DIALYSIS (H): ICD-10-CM

## 2017-11-06 DIAGNOSIS — N18.6 TYPE 2 DIABETES MELLITUS WITH CHRONIC KIDNEY DISEASE ON CHRONIC DIALYSIS, WITHOUT LONG-TERM CURRENT USE OF INSULIN (H): Primary | ICD-10-CM

## 2017-11-06 DIAGNOSIS — G25.81 RESTLESS LEG SYNDROME: ICD-10-CM

## 2017-11-06 DIAGNOSIS — I10 ESSENTIAL HYPERTENSION WITH GOAL BLOOD PRESSURE LESS THAN 140/90: ICD-10-CM

## 2017-11-06 DIAGNOSIS — Z99.2 ESRD (END STAGE RENAL DISEASE) ON DIALYSIS (H): ICD-10-CM

## 2017-11-06 DIAGNOSIS — Z99.2 TYPE 2 DIABETES MELLITUS WITH CHRONIC KIDNEY DISEASE ON CHRONIC DIALYSIS, WITHOUT LONG-TERM CURRENT USE OF INSULIN (H): Primary | ICD-10-CM

## 2017-11-06 DIAGNOSIS — E11.22 TYPE 2 DIABETES MELLITUS WITH CHRONIC KIDNEY DISEASE ON CHRONIC DIALYSIS, WITHOUT LONG-TERM CURRENT USE OF INSULIN (H): Primary | ICD-10-CM

## 2017-11-06 PROCEDURE — 99310 SBSQ NF CARE HIGH MDM 45: CPT | Performed by: NURSE PRACTITIONER

## 2017-11-06 RX ORDER — PENTOXIFYLLINE 400 MG/1
400 TABLET, EXTENDED RELEASE ORAL DAILY
COMMUNITY
End: 2017-12-21

## 2017-11-06 NOTE — PROGRESS NOTES
[facility]  PRIMARY CARE PROVIDER AND CLINIC:  [facility] [address] [facility]  / JEFFERY KRAMER 5*  Chief Complaint   Patient presents with     Hospital F/U       HPI:    Kathryn Banks is a 77 year old  (1940),admitted to the [facility]  from [facility].  Hospital stay 10/22/17 through 11/1/17.  Admitted to this facility for  rehab, medical management and nursing care.  HPI information obtained from: facility chart records, facility staff, patient report, [facility] chart review and Care Everywhere [facility] chart review.     [facility] SUMMARY: 77-year-old female with a recent prolonged hospitalization with multiple medical issues due to acute hypoxic respiratory failure secondary to pneumonia and pulmonary edema and complicated by acute renal failure and since has been on dialysis, history of atrial fibrillation with RVR and on warfarin, recent C dif colitis which has since cleared -- who prevents with nausea and abdominal pain and found to have a small bowel obstruction with possible closed loop.        Current issues are:         Type 2 diabetes mellitus with chronic kidney disease on chronic dialysis, without long-term current use of insulin (H)  ANABEL on CPAP  ESRD (end stage renal disease) on dialysis (H)  Atherosclerosis of native coronary artery of native heart without angina pectoris  Essential hypertension with goal blood pressure less than 140/90  Paroxysmal atrial fibrillation (H)  Restless leg syndrome  Physical deconditioning  Bilateral leg edema    CODE STATUS/ADVANCE DIRECTIVES DISCUSSION:   CPR/Full code   Patient's living condition: lives with spouse    ALLERGIES:Ciprofloxacin; Oxycodone; Lisinopril; Sulfa drugs; and Penicillins  PAST MEDICAL HISTORY:  has a past medical history of Carpal tunnel syndrome; Coronary atherosclerosis of native coronary artery (2006); OBSTRUCTIVE SLEEP APNEA; Osteoarthrosis,  unspecified whether generalized or localized, unspecified site; Other and combined forms of senile cataract (2000); Other and unspecified hyperlipidemia; RESTLESS LEGS SYNDROME; TENOSYNOV HAND/WRIST NEC (6/30/2006); Type II or unspecified type diabetes mellitus without mention of complication, not stated as uncontrolled (2001); Typical atrial flutter (H) (01/17/2007); and Unspecified essential hypertension.  PAST SURGICAL HISTORY:  has a past surgical history that includes TOTAL ABDOM HYSTERECTOMY (1995); REMOVAL OF OVARY/TUBE(S); APPENDECTOMY; REVISE MEDIAN N/CARPAL TUNNEL SURG; SOTO W/O FACETEC FORAMOT/[facility]KC 1/2 VRT SEG, LUMBAR (1968); VAGOTOMY/PYLOROPLASTY,SELECT (1970); REMV CATARACT INTRACAP,INSERT LENS; COLONOSCOPY THRU STOMA, DIAGNOSTIC (10/7/04); endoscopy (03/21/2000); Colonoscopy w/wo [place] **Performed** (10/31/07); UGI ENDOSCOPY DIAG W BIOPSY (10/31/07); colonoscopy (12/3/2007); UPPER GI ENDOSCOPY,EXAM (12/3/2007); cystoscopy (11/25/09); angioplasty; CABG, VEIN, FIVE (12/06); Colonoscopy (1/17/2014); Open reduction internal fixation hip nailing (Left, 7/3/2016); and Laparotomy, lysis adhesions, combined (N/A, 10/24/2017).  FAMILY HISTORY: family history includes Blood Disease in her father; DIABETES in her father, maternal grandmother, and paternal grandmother; Neurologic Disorder in her father. There is no history of Hypertension or CEREBROVASCULAR DISEASE.  SOCIAL HISTORY:  reports that she quit smoking about 48 years ago. She quit after 10.00 years of use. She has never used smokeless tobacco. She reports that she drinks alcohol. She reports that she does not use illicit drugs.    Post Discharge Medication Reconciliation Status: discharge medications reconciled, continue medications without change.  Current Outpatient Prescriptions   Medication Sig Dispense Refill     pentoxifylline (TRENTAL) 400 MG CR tablet Take 400 mg by mouth daily       metoprolol (LOPRESSOR) 25 MG tablet Take 0.5 tablets (12.5 mg)  by mouth 2 times daily 60 tablet      warfarin (COUMADIN) 2 MG tablet 1 mg daily for aflut and adjust for desired INR 2.0 to 3.0 30 tablet      LORazepam (ATIVAN) 0.5 MG tablet Take 0.5 tablets (0.25 mg) by mouth every 8 hours as needed for anxiety 20 tablet 0     hypromellose-dextran (ARTIFICAL TEARS) SOLN ophthalmic solution Place 1 drop into both eyes 3 times daily as needed for dry eyes       famotidine (PEPCID) 20 MG tablet Take 1 tablet (20 mg) by mouth 2 times daily as needed 60 tablet 1     AMIODARONE HCL PO Take 200 mg by mouth 2 times daily        cholecalciferol 5000 UNITS CAPS Take 1 capsule (5,000 Units) by mouth daily  3     pramipexole (MIRAPEX) 0.125 MG tablet Take 1 tablet (0.125 mg) by mouth 3 times daily 90 tablet 3     fluticasone (FLONASE) 50 MCG/ACT spray Spray 1 spray into both nostrils daily 1 Bottle 0     ipratropium - albuterol 0.5 mg/2.5 mg/3 mL (DUONEB) 0.5-2.5 (3) MG/3ML neb solution Take 1 vial by nebulization every 6 hours as needed        ACETAMINOPHEN PO Take 650 mg by mouth every 4 hours as needed for pain For pain 1-5 out of 10       nitroGLYcerin (NITROSTAT) 0.4 MG sublingual tablet Place 1 tablet (0.4 mg) under the tongue every 5 minutes as needed for chest pain 25 tablet 0     pravastatin (PRAVACHOL) 40 MG tablet Take 1 tablet (40 mg) by mouth daily 90 tablet 3     calcium acetate (PHOSLO) 667 MG CAPS capsule Take 1 capsule (667 mg) by mouth 3 times daily (with meals) 180 capsule      ORDER FOR DME Equipment being ordered: cpap machine, mask, humidifier, tubing and filters 1 Device 0       ROS:  10 point ROS of systems including Constitutional, Eyes, Respiratory, Cardiovascular, Gastroenterology, Genitourinary, Integumentary, Muscularskeletal, Psychiatric were all negative except for pertinent positives noted.    Exam:  /83  Pulse 80  Temp 96.8  F (36  C)  Resp 18  SpO2 98%  GENERAL APPEARANCE:  Alert, in no distress  RESP:  respiratory effort and palpation of chest  "normal, no respiratory distress, lungs sounds clear  CV:  Palpation and auscultation of heart done , regular rate and rhythm, no murmur, rub, or gallop, edema +2 pitting bilateral LE  ABDOMEN:  normal bowel sounds, soft, nontender, no hepatosplenomegaly or other masses  M/S:  Gait and station observed in wheelchair, no tenderness or swelling of the joints   SKIN:  Inspection and palpation of skin and subcutaneous tissue at baseline  PSYCH:  insight and judgement, memory intact, affect and mood normal     Lab/Diagnostic data: Hospital labs reviewed.    ASSESSMENT/PLAN:  Type 2 diabetes mellitus with chronic kidney disease on chronic dialysis, without long-term current use of insulin (H)  Prior to kidney failure patient was on metformin and \"something else\" now currently on no medications. Twice daily blood sugar checks have been 100 - 180. Patient does not want to check her blood sugar as often  - check blood sugars every AM three times weekly.     ANABEL on CPAP  Stable  - continue cpap    ESRD (end stage renal disease) on dialysis (H)  Dialysis started in October 2017. Today she has a lot of questions and concerns about her diet. We reviewed some of the information in her room, recommended she meet with the dietician at dialysis and meet with dietician at the nursing home.  - continue mwf dialysis  - follow up with nephrology as directed  - continue phoslo TID with meals    Atherosclerosis of native coronary artery of native heart without angina pectoris  Denies chestpain. Stable continue statin    Essential hypertension with goal blood pressure less than 140/90  BP goal is < 150/80mmHg.  To avoid risk of hypotension, falls, dizziness and tissue hypoperfusion.  BP Readings from Last 3 Encounters:   11/06/17 161/83   11/03/17 150/85   11/02/17 141/86   Controlled: b/ps elevated today but has not had dialysis since Friday.   Medications: metoprolol  Lab Results   Component Value Date    CR 2.85 11/01/2017    "   Plan:adjust meds if b/ps remain high.     Paroxysmal atrial fibrillation (H)  Rate controlled and regular today  - continue metoprolol, amiodorone and warfarin    Restless leg syndrome  Denies concerns   - continue mirapex    Physical deconditioning  Prolonged hospital stay for small bowel obstruction and prior to that for pneumonia and initiation of dialysis.  - Physical Therapy / Ocupational Therapy goal is to return home with her     Bilateral leg edema  Edema present legs dry.  - lotion (aquaphor or similar recommended) to both legs before bedtime. Also recommended diabetic socks.        Orders:  1. Change Duo Nebs to Q6hrs PRN   2. Please have Dietitian meet with Resident   3. Apply lotion to legs at bedtime  4. Check BLood Sugars in AM x3 weeks    Electronically signed by:  Hortensia Alcala NP

## 2017-11-06 NOTE — PROGRESS NOTES
Clinic Care Coordination Contact    Situation: Patient chart reviewed by care coordinator.    Background: Monitoring pt since hospital discharge 11/1 to TCU    Assessment: Pt continue to reside in TCU     Plan/Recommendations: RN will continue to monitor every 2-4 weeks for TCU discharge.     Meenakshi REYNAGA, RN, PHN  Care Coordination    [facility]  [address] [place] [address]  Office: [phone]  Fax [phone]   [facility]  [address] [place] [address]  Office: [phone] Fax [phone]  [email or web address]   [email or web address]   Connect with [facility] on social media.

## 2017-11-08 ENCOUNTER — NURSING HOME VISIT (OUTPATIENT)
Dept: FAMILY MEDICINE | Facility: OTHER | Age: 77
End: 2017-11-08
Payer: COMMERCIAL

## 2017-11-08 ENCOUNTER — TELEPHONE (OUTPATIENT)
Dept: NEPHROLOGY | Facility: CLINIC | Age: 77
End: 2017-11-08

## 2017-11-08 VITALS
BODY MASS INDEX: 27.15 KG/M2 | RESPIRATION RATE: 16 BRPM | SYSTOLIC BLOOD PRESSURE: 190 MMHG | TEMPERATURE: 98.3 F | WEIGHT: 158.2 LBS | DIASTOLIC BLOOD PRESSURE: 86 MMHG | HEART RATE: 69 BPM | OXYGEN SATURATION: 98 %

## 2017-11-08 DIAGNOSIS — N17.9 ACUTE RENAL FAILURE SUPERIMPOSED ON STAGE 4 CHRONIC KIDNEY DISEASE, UNSPECIFIED ACUTE RENAL FAILURE TYPE (H): ICD-10-CM

## 2017-11-08 DIAGNOSIS — I10 ESSENTIAL HYPERTENSION WITH GOAL BLOOD PRESSURE LESS THAN 140/90: ICD-10-CM

## 2017-11-08 DIAGNOSIS — N18.4 ACUTE RENAL FAILURE SUPERIMPOSED ON STAGE 4 CHRONIC KIDNEY DISEASE, UNSPECIFIED ACUTE RENAL FAILURE TYPE (H): ICD-10-CM

## 2017-11-08 DIAGNOSIS — I48.0 PAROXYSMAL ATRIAL FIBRILLATION (H): ICD-10-CM

## 2017-11-08 DIAGNOSIS — Z99.2 TYPE 2 DIABETES MELLITUS WITH CHRONIC KIDNEY DISEASE ON CHRONIC DIALYSIS, WITHOUT LONG-TERM CURRENT USE OF INSULIN (H): ICD-10-CM

## 2017-11-08 DIAGNOSIS — K21.9 GASTROESOPHAGEAL REFLUX DISEASE WITHOUT ESOPHAGITIS: ICD-10-CM

## 2017-11-08 DIAGNOSIS — E11.22 TYPE 2 DIABETES MELLITUS WITH CHRONIC KIDNEY DISEASE ON CHRONIC DIALYSIS, WITHOUT LONG-TERM CURRENT USE OF INSULIN (H): ICD-10-CM

## 2017-11-08 DIAGNOSIS — N18.6 TYPE 2 DIABETES MELLITUS WITH CHRONIC KIDNEY DISEASE ON CHRONIC DIALYSIS, WITHOUT LONG-TERM CURRENT USE OF INSULIN (H): ICD-10-CM

## 2017-11-08 DIAGNOSIS — K56.609 SBO (SMALL BOWEL OBSTRUCTION) (H): ICD-10-CM

## 2017-11-08 DIAGNOSIS — G47.33 OSA ON CPAP: ICD-10-CM

## 2017-11-08 PROCEDURE — 99305 1ST NF CARE MODERATE MDM 35: CPT | Performed by: FAMILY MEDICINE

## 2017-11-08 RX ORDER — ASCORBIC ACID, THIAMINE MONONITRATE,RIBOFLAVIN, NIACINAMIDE, PYRIDOXINE HYDROCHLORIDE, FOLIC ACID, CYANOCOBALAMIN, BIOTIN, CALCIUM PANTOTHENATE, 100; 1.5; 1.7; 20; 10; 1; 6000; 150000; 5 MG/1; MG/1; MG/1; MG/1; MG/1; MG/1; UG/1; UG/1; MG/1
1 CAPSULE, LIQUID FILLED ORAL DAILY
COMMUNITY

## 2017-11-08 NOTE — MR AVS SNAPSHOT
After Visit Summary   11/8/2017    Kathryn Banks    MRN: [id number]           Patient Information     Date Of Birth          1940        Visit Information        Provider Department      11/8/2017 9:37 AM Tala Cisneros MD [facility]        Today's Diagnoses     SBO -- S/P Lysis of Adhes 10/24/17        ANABEL on CPAP        Gastroesophageal reflux disease without esophagitis        Type 2 diabetes mellitus with chronic kidney disease on chronic dialysis, without long-term current use of insulin (H)        Acute renal failure superimposed on stage 4 chronic kidney disease, unspecified acute renal failure type (H)        Essential hypertension with goal blood pressure less than 140/90        Paroxysmal atrial fibrillation (H)           Follow-ups after your visit        Your next 10 appointments already scheduled     Dec 05, 2017 12:30 PM CST   LAB with LAB FIRST FLOOR [facility] ([facility])    [address] [place] [address] [id number] [phone]           Please do not eat 10-12 hours before your appointment if you are coming in fasting for labs on lipids, cholesterol, or glucose (sugar). This does not apply to pregnant women. Water, hot tea and black coffee (with nothing added) are okay. Do not drink other fluids, diet soda or chew gum.            Dec 05, 2017  1:00 PM CST   [facility] with [facility]field [facility]   [facility]    [address] [place] [address] [id number] [phone]              Who to contact     If you have questions or need follow up information about today's clinic visit or your schedule please contact [facility] [place] directly at [phone].  Normal or non-critical lab and imaging results will be communicated to you by MyChart, letter or phone within 4 business days after the clinic has received the results. If you do not hear  "from us within 7 days, please contact the clinic through [employer] or phone. If you have a critical or abnormal lab result, we will notify you by phone as soon as possible.  Submit refill requests through [employer] or call your pharmacy and they will forward the refill request to us. Please allow 3 business days for your refill to be completed.          Additional Information About Your Visit        [employer] Information     Talkwheel lets you send messages to your doctor, view your test results, renew your prescriptions, schedule appointments and more. To sign up, go to [email or web address] . Click on \"Log in\" on the left side of the screen, which will take you to the Welcome page. Then click on \"Sign up Now\" on the right side of the page.     You will be asked to enter the access code listed below, as well as some personal information. Please follow the directions to create your username and password.     Your access code is: [email or web address]  Expires: 2017  3:04 PM     Your access code will  in 90 days. If you need help or a new code, please call your [facility] clinic or [phone].        Care EveryWhere ID     This is your Care EveryWhere ID. This could be used by other organizations to access your [facility] medical records  [id number]        Your Vitals Were     Pulse Temperature Respirations Pulse Oximetry BMI (Body Mass Index)       69 98.3  F (36.8  C) 16 98% 27.15 kg/m2        Blood Pressure from Last 3 Encounters:   17 190/86   17 161/83   17 150/85    Weight from Last 3 Encounters:   17 158 lb 3.2 oz (71.8 kg)   17 151 lb (68.5 kg)   10/30/17 155 lb 3.3 oz (70.4 kg)              Today, you had the following     No orders found for display       Primary Care Provider Office Phone # Fax #    Dheeraj Jj [phone]       [facility] [address] [place] [address]        Equal Access to Services     CAROLINE BLAND AH: Tl dee " rakesh Garcia, wacatarinoda lujudyadaha, qaybta kaalaina downey, tay radhain hayaaangel saraviaюлия conniefatoumata laCraigevelia lidia. So [facility] [phone].    ATENCIÓN: Si habla jose, tiene a medrano disposición servicios gratuitos de asistencia lingüística. Vicki al [phone].    We comply with applicable federal civil rights laws and [place] laws. We do not discriminate on the basis of race, color, national origin, age, disability, sex, sexual orientation, or gender identity.            Thank you!     Thank you for choosing [facility] [place] [facility] [place] [facility]  for your care. Our goal is always to provide you with excellent care. Hearing back from our patients is one way we can continue to improve our services. Please take a few minutes to complete the written survey that you may receive in the mail after your visit with us. Thank you!             Your Updated Medication List - Protect others around you: Learn how to safely use, store and throw away your medicines at www.disposemymeds.org.          This list is accurate as of: 11/8/17 11:59 PM.  Always use your most recent med list.                   Brand Name Dispense Instructions for use Diagnosis    ACETAMINOPHEN PO      Take 650 mg by mouth every 4 hours as needed for pain For pain 1-5 out of 10        AMIODARONE HCL PO      Take 200 mg by mouth 2 times daily        calcium acetate 667 MG Caps capsule    PHOSLO    180 capsule    Take 1 capsule (667 mg) by mouth 3 times daily (with meals)    Chronic kidney disease, unspecified CKD stage       cholecalciferol 5000 UNITS Caps      Take 1 capsule (5,000 Units) by mouth daily    CKD (chronic kidney disease) stage 5, GFR less than 15 ml/min (H)       famotidine 20 MG tablet    PEPCID    60 tablet    Take 1 tablet (20 mg) by mouth 2 times daily as needed    Gastroesophageal reflux disease without esophagitis       fluticasone 50 MCG/ACT spray    FLONASE    1 Bottle    Spray 1 spray into both nostrils daily    Nasal congestion        hypromellose-dextran Soln ophthalmic solution      Place 1 drop into both eyes 3 times daily as needed for dry eyes    Dry eyes       ipratropium - albuterol 0.5 mg/2.5 mg/3 mL 0.5-2.5 (3) MG/3ML neb solution    DUONEB     Take 1 vial by nebulization every 6 hours as needed        LORazepam 0.5 MG tablet    ATIVAN    20 tablet    Take 0.5 tablets (0.25 mg) by mouth every 8 hours as needed for anxiety    Anxiety       metoprolol 25 MG tablet    LOPRESSOR    60 tablet    Take 0.5 tablets (12.5 mg) by mouth 2 times daily    Atrial flutter, unspecified type (H)       nitroGLYcerin 0.4 MG sublingual tablet    NITROSTAT    25 tablet    Place 1 tablet (0.4 mg) under the tongue every 5 minutes as needed for chest pain    Hx of non-ST elevation myocardial infarction (NSTEMI), Atherosclerosis of native coronary artery of native heart without angina pectoris, Postsurgical aortocoronary bypass status       order for DME     1 Device    Equipment being ordered: cpap machine, mask, humidifier, tubing and filters    ANABEL (obstructive sleep apnea)       pentoxifylline 400 MG CR tablet    TRENtal     Take 400 mg by mouth daily        pramipexole 0.125 MG tablet    MIRAPEX    90 tablet    Take 1 tablet (0.125 mg) by mouth 3 times daily    Restless leg syndrome       pravastatin 40 MG tablet    PRAVACHOL    90 tablet    Take 1 tablet (40 mg) by mouth daily    Hx of non-ST elevation myocardial infarction (NSTEMI), Atherosclerosis of native coronary artery of native heart without angina pectoris, Type 2 diabetes mellitus with other circulatory complications       RENAL 1 MG Caps      Take 1 capsule by mouth daily        warfarin 2 MG tablet    COUMADIN    30 tablet    1 mg daily for aflut and adjust for desired INR 2.0 to 3.0    Atrial flutter, unspecified type (H)

## 2017-11-08 NOTE — TELEPHONE ENCOUNTER
[facility] Call Center    Phone Message    Name of Caller: Katharina     Phone Number:[phone]     Best time to return call: Any    May a detailed message be left on voicemail: yes    Relation to patient: Other Name: Angela Grullon   Relationship:   Is there legal documentation in chart to discuss information with this person: Yes. Legal Documentation is verified by Third Party.      Reason for Call: Other: Katharina is calling from [facility] to notify Dr. Barfield that they are sending a fax regarding patients vital signs as her blood pressure is elevated and the house Provider adjusted blood pressure Rx. Please advise.      Action Taken: Message routed to:  Adult Clinics: Nephrology p [phone]

## 2017-11-09 PROBLEM — I13.0 HYPERTENSIVE HEART AND CHRONIC KIDNEY DISEASE WITH HEART FAILURE AND STAGE 1 THROUGH STAGE 4 CHRONIC KIDNEY DISEASE, OR UNSPECIFIED CHRONIC KIDNEY DISEASE (H): Status: RESOLVED | Noted: 2017-06-01 | Resolved: 2017-11-09

## 2017-11-09 PROBLEM — I50.9 CHF (CONGESTIVE HEART FAILURE) (H): Status: RESOLVED | Noted: 2017-07-06 | Resolved: 2017-11-09

## 2017-11-09 PROBLEM — R19.7 DIARRHEA, UNSPECIFIED TYPE: Status: RESOLVED | Noted: 2017-10-20 | Resolved: 2017-11-09

## 2017-11-09 PROBLEM — K91.89 POSTOPERATIVE ILEUS (H): Status: RESOLVED | Noted: 2017-10-28 | Resolved: 2017-11-09

## 2017-11-09 PROBLEM — R00.1 BRADYCARDIA: Status: RESOLVED | Noted: 2017-06-02 | Resolved: 2017-11-09

## 2017-11-09 PROBLEM — K92.1 BLOOD IN STOOL: Status: RESOLVED | Noted: 2017-06-14 | Resolved: 2017-11-09

## 2017-11-09 PROBLEM — D50.9 ANEMIA, IRON DEFICIENCY: Status: RESOLVED | Noted: 2017-08-09 | Resolved: 2017-11-09

## 2017-11-09 PROBLEM — K56.7 POSTOPERATIVE ILEUS (H): Status: RESOLVED | Noted: 2017-10-28 | Resolved: 2017-11-09

## 2017-11-09 NOTE — PROGRESS NOTES
HISTORY OF PRESENT ILLNESS:  Kathryn is a 77 year old female, (1940), admitted to [facility] from [facility].  Hospital stay 10/22/17 through 17, brief hospital summary and hospital course copied below.  Admitted to this facility for  rehab, medical management and nursing care.  HPI information obtained from: facility chart records, facility staff, patient report, [facility] chart review.     BRIEF HOSPITAL SUMMARY: 77-year-old female with a recent prolonged hospitalization with multiple medical issues due to acute hypoxic respiratory failure secondary to pneumonia and pulmonary edema and complicated by acute renal failure and since has been on dialysis, history of atrial fibrillation with RVR and on warfarin, recent C dif colitis which has since cleared -- who prevents with nausea and abdominal pain and found to have a small bowel obstruction with possible closed loop.    Hospital Course      Seen by Vascular surgery and had exploratory lap on 10/24 which showed:    1.  Distal ileal bowel obstruction secondary to adhesive bands.     2.  Focal perforation of distal 1/4 ileum secondary to adhesive band.   Underwent the followin.  Exploratory laparotomy.   2.  Lysis of  adhesions.   3.  Repair of focal distal ileal perforation.      Had prolonged postop ileus, continued on dialysis and occasional ultrafiltration to manage fluid status.  Overall did well, and bowels moving and good appetite at time of discharge.  Restarted on warfarin and INR 2.51 at time of discharge.       She said she was fine physically at discharge, but quite anxious mentally, and tearful about whether she will be able to take care of herself going forward.  I am hopeful that her strength will improved, she is urinating and it's possible she might be able to come off dialysis if she continues to improve (defer to Nephrology), and at present will continue dialysis Mon, Wed, Friday, and will check  INR in 2 days then as needed.       Past Medical History/  Patient Active Problem List    Diagnosis Date Noted     ESRD (end stage renal disease) on dialysis (H) 11/06/2017     Priority: Medium     Physical deconditioning 11/06/2017     Priority: Medium     Bilateral leg edema 11/06/2017     Priority: Medium     DM type 2 -- Hgb A1C 6.6 on 10/13/17 10/29/2017     Priority: Medium     SBO -- S/P Lysis of Adhes 10/24/17 10/22/2017     Priority: Medium     Paroxysmal atrial fibrillation (H) 10/20/2017     Priority: Medium     Anxiety 10/20/2017     Priority: Medium     Anemia in chronic kidney disease 09/20/2017     Priority: Medium     Microscopic hematuria 06/21/2017     Priority: Medium     Acute on chronic renal failure -- Dialysis started 10/2017 06/12/2017     Priority: Medium     Memory loss 06/12/2017     Priority: Medium     Proteinuria 2.1 g/g Cr 06/02/2017     Priority: Medium     Chronic heart failure (H) with preserved EF 05/31/2017     Priority: Medium     Atrial flutter (H) - present 6/12 05/31/2017     Priority: Medium     Pulmonary hypertension 05/24/2017     Priority: Medium     Essential hypertension with goal blood pressure less than 140/90 09/13/2016     Priority: Medium     Osteoarthritis of hip 04/29/2015     Priority: Medium     Lymphedema 11/10/2014     Priority: Medium     Health Care Home 01/20/2014     Priority: Medium     State Tier Level:  Tier 3  Status:  Closed  Care Coordinator:  Pinky Oden if needed in the future.     See Letters for [facility] Care Plan         CAD - NSTEMI, CABG x 5 in 2006, angio 2013 patent grafts except occluded graft to PL 02/04/2013     Priority: Medium     Hyperlipidemia LDL goal <100 10/31/2010     Priority: Medium     Kidney stone 12/29/2009     Priority: Medium     Esophageal reflux 03/28/2006     Priority: Medium     Lipoma of other skin and subcutaneous tissue 09/07/2004     Priority: Medium     ANABEL on CPAP      Priority: Medium     Advanced directives,  counseling/discussion 2012     Priority: Low     Advance Directive received and scanned. Click on Code in the patient header to view. 2012          History of peptic ulcer disease 10/17/2007     Priority: Low     Problem list name updated by automated process. Provider to review       Restless leg syndrome      Priority: Low       Past Surgical History:   Procedure Laterality Date     ANGIOPLASTY       C APPENDECTOMY       C CABG, VEIN, FIVE      SVG x 4 and LIMA to LAD     C SOTO W/O FACETEC FORAMOT/DSKC  VRT SEG, LUMBAR  1968     C REMV CATARACT INTRACAP,INSERT LENS      Bilateral     C TOTAL ABDOM HYSTERECTOMY       - fibroids     C VAGOTOMY/PYLOROPLASTY,SELECT  1970     COLONOSCOPY  12/3/2007     COLONOSCOPY  2014    Procedure: COLONOSCOPY;  Colonoscopy;  Surgeon: Zack Stearns MD;  Location:  GI     CYSTOSCOPY  09    [facility]     ENDOSCOPY  2000    Upper GI     HC COLONOSCOPY THRU STOMA, DIAGNOSTIC  10/7/04    poor prep, repeat in 2-3 years     HC COLONOSCOPY W/WO BRUSH/WASH  10/31/07    Repeat-poor quality prep     HC REMOVAL OF OVARY/TUBE(S)      Salpingo-Oophorectomy, Unilateral     HC REVISE MEDIAN N/CARPAL TUNNEL SURG      Carpal tunnel release     HC UGI ENDOSCOPY DIAG W BIOPSY  10/31/07     HC UGI ENDOSCOPY, SIMPLE EXAM  12/3/2007     LAPAROTOMY, LYSIS ADHESIONS, COMBINED N/A 10/24/2017    Procedure: COMBINED LAPAROTOMY, LYSIS ADHESIONS;  REPAIR FOCAL ILEAL SMALL BOWEL PERFERATION, LYSIS OF ADHESIONS.;  Surgeon: Rashard Zafar MD;  Location:  OR     OPEN REDUCTION INTERNAL FIXATION HIP NAILING Left 7/3/2016    Procedure: OPEN REDUCTION INTERNAL FIXATION HIP NAILING;  Surgeon: Lake Schulz MD;  Location:  OR       Family History   Problem Relation Age of Onset     DIABETES Father      AODM     Neurologic Disorder Father      Parkinson's     Blood Disease Father       from blood clot from leg to lung immediately after hip fracture      DIABETES Paternal Grandmother      adult onset     DIABETES Maternal Grandmother      adult onset     Hypertension No family hx of      CEREBROVASCULAR DISEASE No family hx of            Social History     Social History     Marital status:      Spouse name: Urbano Banks     Number of children: 2     Years of education: 13     Occupational History     Ministry coordinator      [employer]     Social History Main Topics     Smoking status: Former Smoker     Years: 10.00     Quit date: 1/1/1969     Smokeless tobacco: Never Used     Alcohol use 0.0 oz/week     0 Standard drinks or equivalent per week      Comment: 1/2 a beer once a month     Drug use: No     Sexual activity: Yes     Birth control/ protection: Surgical     Other Topics Concern     Parent/Sibling W/ Cabg, Mi Or Angioplasty Before 65f 55m? No     Social History Narrative        Current Outpatient Prescriptions   Medication Sig     B Complex-C-Folic Acid (RENAL) 1 MG CAPS Take 1 capsule by mouth daily     pentoxifylline (TRENTAL) 400 MG CR tablet Take 400 mg by mouth daily     metoprolol (LOPRESSOR) 25 MG tablet Take 0.5 tablets (12.5 mg) by mouth 2 times daily     warfarin (COUMADIN) 2 MG tablet 1 mg daily for aflut and adjust for desired INR 2.0 to 3.0     LORazepam (ATIVAN) 0.5 MG tablet Take 0.5 tablets (0.25 mg) by mouth every 8 hours as needed for anxiety     hypromellose-dextran (ARTIFICAL TEARS) SOLN ophthalmic solution Place 1 drop into both eyes 3 times daily as needed for dry eyes     famotidine (PEPCID) 20 MG tablet Take 1 tablet (20 mg) by mouth 2 times daily as needed     AMIODARONE HCL PO Take 200 mg by mouth 2 times daily      cholecalciferol 5000 UNITS CAPS Take 1 capsule (5,000 Units) by mouth daily     pramipexole (MIRAPEX) 0.125 MG tablet Take 1 tablet (0.125 mg) by mouth 3 times daily     fluticasone (FLONASE) 50 MCG/ACT spray Spray 1 spray into both nostrils daily     ipratropium - albuterol 0.5  mg/2.5 mg/3 mL (DUONEB) 0.5-2.5 (3) MG/3ML neb solution Take 1 vial by nebulization every 6 hours as needed      ACETAMINOPHEN PO Take 650 mg by mouth every 4 hours as needed for pain For pain 1-5 out of 10     nitroGLYcerin (NITROSTAT) 0.4 MG sublingual tablet Place 1 tablet (0.4 mg) under the tongue every 5 minutes as needed for chest pain     pravastatin (PRAVACHOL) 40 MG tablet Take 1 tablet (40 mg) by mouth daily     calcium acetate (PHOSLO) 667 MG CAPS capsule Take 1 capsule (667 mg) by mouth 3 times daily (with meals)     ORDER FOR DME Equipment being ordered: cpap machine, mask, humidifier, tubing and filters     No current facility-administered medications for this visit.        Allergies   Allergen Reactions     Ciprofloxacin Nausea and Vomiting     Zofran did not help     Oxycodone Visual Disturbance     Delusions, blackouts      Lisinopril Cough     Sulfa Drugs GI Disturbance     LOSS OF TASTE     Penicillins Other (See Comments)     Swelling and reddness at injection site, pt wanted it on        I have reviewed the care plan and do agree with the plan.    Information reviewed:  Medications, vital signs, orders, and nursing notes.    ROS:  No chest pain, shortness of breath, fevers, chills, headache, nausea, vomiting, dysuria.  Appetite is  decreased.  Just had bowel movement.      OBJECTIVE:  /86  Pulse 69  Temp 98.3  F (36.8  C)  Resp 16  Wt 158 lb 3.2 oz (71.8 kg)  SpO2 98%  BMI 27.15 kg/m2  GENERAL APPEARANCE:  Alert, in no distress  Eyes:  Sclera and conjunctiva clear  Neck: supple, no adenopathy  RESP:  respiratory effort normal, no respiratory distress, lungs sounds clear  CV:   regular rate and rhythm, no murmur, rub, or gallop, edema +2 pitting bilateral lower extremities   ABDOMEN:  normal bowel sounds,  no masses.  Incision clean, dry and intact.  Mildly tender around incision, no rebound or guarding.  M/S:  Gait and station observed in wheelchair, no tenderness or swelling of the  joints   PSYCH:  insight and judgement, memory intact, affect and mood normal     ASSESSMENT/PLAN:    SBO -- S/P Lysis of Adhes 10/24/17  Bowels moving, no signs of infection over incision, appetite decreased, but no nausea or vomiting.    ANABEL on CPAP  Continue CPAP    Esophageal reflux  Continue Pepcid    DM type 2 -- Hgb A1C 6.6 on 10/13/17  Blood sugars 100-180s.  Diet controlled.    Acute on chronic renal failure -- Dialysis started 10/2017  She is heading off to dialysis today, continues to go 3 times a week.  Follows with Nephrology.  Continue Phoslo with meals.    Essential hypertension with goal blood pressure less than 140/90  BPs elevated, medications are held in am of her dialysis days, but even on other days and times her BP is consistently >140.  Will increase her metoprolol from 12.5 mg twice daily to 25 mg twice daily.    Paroxysmal atrial fibrillation (H)  On amiodarone.            Tala Cisneros MD

## 2017-11-09 NOTE — ASSESSMENT & PLAN NOTE
Bowels moving, no signs of infection over incision, appetite decreased, but no nausea or vomiting.

## 2017-11-09 NOTE — TELEPHONE ENCOUNTER
Informed [facility] that updates should be sent to patient's dialysis unit. Kathryn is no longer followed by nephrologist at [facility] due to dialysis status. Caller verbalized understanding.    Arlet Webb RN, BSN  Nephrology Care Coordinator  [facility]

## 2017-11-09 NOTE — ASSESSMENT & PLAN NOTE
She is heading off to dialysis today, continues to go 3 times a week.  Follows with Nephrology.  Continue Phoslo with meals.

## 2017-11-09 NOTE — ASSESSMENT & PLAN NOTE
BPs elevated, medications are held in am of her dialysis days, but even on other days and times her BP is consistently >140.  Will increase her metoprolol from 12.5 mg twice daily to 25 mg twice daily.

## 2017-11-19 ENCOUNTER — TELEPHONE (OUTPATIENT)
Dept: GERIATRICS | Facility: CLINIC | Age: 77
End: 2017-11-19

## 2017-11-19 NOTE — TELEPHONE ENCOUNTER
RN reported resident's weight up today. Has a long history of chronic diseases including type 2 diabetes with chronic kidney disease on chronic dialysis. Last dialysis Friday.     Weight prior to dialysis 153  Post dialysis 146  Weight today 152    Resident is asymptomatic.     PLAN:  Continue to monitor. Increased weight likely related to need for dialysis. Asymptomatic. No new orders today.     RHODA Estevez [facility]

## 2017-11-20 ENCOUNTER — TELEPHONE (OUTPATIENT)
Dept: GERIATRICS | Facility: CLINIC | Age: 77
End: 2017-11-20

## 2017-11-20 NOTE — TELEPHONE ENCOUNTER
Dx afib, on coumadin INR today 2.5 and previously INR 3.8 on 11/17. Coumadin 1 mg PO on 11/17, 11/18 and 11/19  Prior to that INR 4.5 on 11/15 and held x 2 days.     Coumadin 1 mg PO today and tomorrow.   Check INR on 11/22    Electronically signed by RHODA Vazquez, GNP

## 2017-11-21 ENCOUNTER — DISCHARGE SUMMARY NURSING HOME (OUTPATIENT)
Dept: GERIATRICS | Facility: CLINIC | Age: 77
End: 2017-11-21
Payer: COMMERCIAL

## 2017-11-21 VITALS
WEIGHT: 151 LBS | SYSTOLIC BLOOD PRESSURE: 161 MMHG | TEMPERATURE: 97.2 F | BODY MASS INDEX: 25.92 KG/M2 | OXYGEN SATURATION: 95 % | RESPIRATION RATE: 20 BRPM | DIASTOLIC BLOOD PRESSURE: 76 MMHG | HEART RATE: 73 BPM

## 2017-11-21 DIAGNOSIS — Z99.2 TYPE 2 DIABETES MELLITUS WITH CHRONIC KIDNEY DISEASE ON CHRONIC DIALYSIS, WITHOUT LONG-TERM CURRENT USE OF INSULIN (H): ICD-10-CM

## 2017-11-21 DIAGNOSIS — Z98.890 S/P EXPLORATORY LAPAROTOMY: ICD-10-CM

## 2017-11-21 DIAGNOSIS — I10 ESSENTIAL HYPERTENSION WITH GOAL BLOOD PRESSURE LESS THAN 140/90: ICD-10-CM

## 2017-11-21 DIAGNOSIS — Z99.2 ESRD (END STAGE RENAL DISEASE) ON DIALYSIS (H): ICD-10-CM

## 2017-11-21 DIAGNOSIS — I25.10 ATHEROSCLEROSIS OF NATIVE CORONARY ARTERY OF NATIVE HEART WITHOUT ANGINA PECTORIS: ICD-10-CM

## 2017-11-21 DIAGNOSIS — N18.6 ESRD (END STAGE RENAL DISEASE) ON DIALYSIS (H): ICD-10-CM

## 2017-11-21 DIAGNOSIS — E11.22 TYPE 2 DIABETES MELLITUS WITH CHRONIC KIDNEY DISEASE ON CHRONIC DIALYSIS, WITHOUT LONG-TERM CURRENT USE OF INSULIN (H): ICD-10-CM

## 2017-11-21 DIAGNOSIS — R60.0 BILATERAL LEG EDEMA: ICD-10-CM

## 2017-11-21 DIAGNOSIS — G25.81 RESTLESS LEG SYNDROME: ICD-10-CM

## 2017-11-21 DIAGNOSIS — M62.81 GENERALIZED MUSCLE WEAKNESS: ICD-10-CM

## 2017-11-21 DIAGNOSIS — N18.6 TYPE 2 DIABETES MELLITUS WITH CHRONIC KIDNEY DISEASE ON CHRONIC DIALYSIS, WITHOUT LONG-TERM CURRENT USE OF INSULIN (H): ICD-10-CM

## 2017-11-21 DIAGNOSIS — I50.32 CHRONIC DIASTOLIC CONGESTIVE HEART FAILURE (H): ICD-10-CM

## 2017-11-21 DIAGNOSIS — I48.0 PAROXYSMAL ATRIAL FIBRILLATION (H): ICD-10-CM

## 2017-11-21 DIAGNOSIS — K56.609 SMALL BOWEL OBSTRUCTION (H): Primary | ICD-10-CM

## 2017-11-21 PROCEDURE — 99316 NF DSCHRG MGMT 30 MIN+: CPT | Performed by: NURSE PRACTITIONER

## 2017-11-21 NOTE — PROGRESS NOTES
[facility] GERIATRIC SERVICES DISCHARGE SUMMARY    PATIENT'S NAME: Kathryn Banks  YOB: 1940  MEDICAL RECORD NUMBER:  [id number]    PRIMARY CARE PROVIDER AND CLINIC RESPONSIBLE AFTER TRANSFER: Dheeraj Jj [facility] [address] [facility]  / JEFFERY KRAMER 5    CODE STATUS/ADVANCE DIRECTIVES DISCUSSION:   CPR/Full code        Allergies   Allergen Reactions     Ciprofloxacin Nausea and Vomiting     Zofran did not help     Oxycodone Visual Disturbance     Delusions, blackouts      Lisinopril Cough     Sulfa Drugs GI Disturbance     LOSS OF TASTE     Penicillins Other (See Comments)     Swelling and reddness at injection site, pt wanted it on        TRANSFERRING PROVIDERS: RHODA Bowden CNP, Tala Cisneros MD  DATE OF SNF ADMISSION:  November / 01 / 2017  DATE OF SNF (anticipated) DISCHARGE: November / 21 / 2017  DISCHARGE DISPOSITION: [facility] Provider   Nursing Facility: [facility] Hospital stay 10/22/17 to 11/1/17.     Condition on Discharge:  Improving.  Function:  Ambulating  ft with 4WW/2WW with modified independent assistance  Cognitive Scores: 5.2 ACL    Equipment: walker    DISCHARGE DIAGNOSIS:   1. Type 2 diabetes mellitus with chronic kidney disease on chronic dialysis, without long-term current use of insulin (H)    2. ANABEL on CPAP    3. ESRD (end stage renal disease) on dialysis (H)    4. Atherosclerosis of native coronary artery of native heart without angina pectoris    5. Essential hypertension with goal blood pressure less than 140/90    6. Paroxysmal atrial fibrillation (H)    7. Restless leg syndrome    8. Physical deconditioning    9. Bilateral leg edema    From Hospital Discharge Summary:  BRIEF HOSPITAL SUMMARY: 77-year-old female with a recent prolonged hospitalization with multiple medical issues due to acute hypoxic respiratory failure secondary to pneumonia and pulmonary edema and complicated by acute  renal failure and since has been on dialysis, history of atrial fibrillation with RVR and on warfarin, recent C dif colitis which has since cleared -- who prevents with nausea and abdominal pain and found to have a small bowel obstruction with possible closed loop.     Hospital Course     Seen by Vascular surgery and had exploratory lap on 10/24 which showed:    1.  Distal ileal bowel obstruction secondary to adhesive bands.     2.  Focal perforation of distal 1/4 ileum secondary to adhesive band.   Underwent the followin.  Exploratory laparotomy.   2.  Lysis of  adhesions.   3.  Repair of focal distal ileal perforation.       Had prolonged postop ileus, continued on dialysis and occasional ultrafiltration to manage fluid status.  Overall did well, and bowels moving and good appetite at time of discharge.  Restarted on warfarin and INR 2.51 at time of discharge.        She said she was fine physically at discharge, but quite anxious mentally, and tearful about whether she will be able to take care of herself going forward.  I am hopeful that her strength will improved, she is urinating and it's possible she might be able to come off dialysis if she continues to improve (defer to Nephrology), and at present will continue dialysis Mon, Wed, Friday, and will check INR in 2 days then as needed.      HPI Nursing Facility Course:  HPI information obtained from: facility chart records, facility staff, patient report and [facility] chart review.    TCU stay - patient was followed by another NP.  Seemingly TCU stay was unremarkable where she attended dialysis M/F/W and worked with Physical Therapy/ Occupational Therapy for strengthening.  Patient is met today in her TCU room where she reports feeling ready to go home.        Small bowel obstruction  S/P exploratory laparotomy/ lysis of adhesions/repair of focal distal ileal perforation (10/24/17)  C/b prolonged post-op ileus   Today patient notes she is still  "slightly \"gassy/bloated\" but much improved.  She reports a good appetite and having BMs.    BS active on exam, slight tenderness but mild     Generalized muscle weakness  Worked with Physical Therapy, Occupational Therapy at [facility]  Therapy progress report:  Patient ambulating  ft with either 2WW or 4WW with modified independent assistance.   ACL 5.2  Improved.     Type 2 diabetes mellitus with chronic kidney disease on chronic dialysis, without long-term current use of insulin (H)  Lab Results   Component Value Date    A1C 6.6 10/13/2017    A1C 7.3 06/22/2017    A1C 6.2 03/31/2017    A1C 6.6 07/03/2016    A1C 7.1 04/12/2016   On no meds  stable    ESRD (end stage renal disease) on dialysis (H)  Cardio Renal  F/b Dr. Barfield, Nephrology at [facility].   Now also doing HD M/W/F at [facility][1] in [place] (roughly [id number])  Patient has questions whether Dr. Barfield will continue to follow her - has f/u appt 12/5  Has right SC line- CDI at today's visit  Continue with phoslo TID with meals, renal caps softgels     Atherosclerosis of native coronary artery of native heart without angina pectoris  Essential hypertension with goal blood pressure less than 140/90  Chronic diastolic congestive heart failure  S/p NSTEMI, 5vCABG 2006, angio 2013 patent grafts except occluded graft to PL  Cardio-renal status - now on HD for fluid status.   F/b: Dr. King, Cardiology    On Metoprolol 50 mg BID, Hydralazine 20 mg q4 hours PRN for SBP >170, Nitro PRN, pravastatin 40 mg daily, amiodarone 200 mg BID.     BPs 150-180s/70-80s  HRs 57-78    BPs consistently high despite increase in Metoprolol (came on 12.5 mg BID --> 50 mg BID now).    Patient denies anxiety/pain at today's visit.   BP meds held the AM of HD.   PRIMARY CARE PROVIDER to please continue to follow.     Paroxysmal atrial fibrillation (H)  On metoprolol, amiodarone, Coumadin.   Coumadin History:  11/15 & 11/16 - held  11/17 - INR 3.8 - 1 mg " daily  11/20 - INR 2.5 - 1 mg daily  NEXT INR check to be drawn by HomeCare RN on 11/22/17.      Restless leg syndrome  Controlled on mirapex 0.125 mg TID    Bilateral leg edema  2/2 CHF likely  On HD M/W/F  Edema mid today - no TEDS on   Continue on lotion (aquaphor or similar) qHS.       PAST MEDICAL HISTORY:  has a past medical history of Carpal tunnel syndrome; Coronary atherosclerosis of native coronary artery (2006); OBSTRUCTIVE SLEEP APNEA; Osteoarthrosis, unspecified whether generalized or localized, unspecified site; Other and combined forms of senile cataract (2000); Other and unspecified hyperlipidemia; RESTLESS LEGS SYNDROME; TENOSYNOV HAND/WRIST NEC (6/30/2006); Type II or unspecified type diabetes mellitus without mention of complication, not stated as uncontrolled (2001); Typical atrial flutter (H) (01/17/2007); and Unspecified essential hypertension.    DISCHARGE MEDICATIONS:  Current Outpatient Prescriptions   Medication Sig Dispense Refill     Metoprolol Tartrate (LOPRESSOR PO) Take 50 mg by mouth 2 times daily       HYDRALAZINE HCL PO Take 20 mg by mouth every 4 hours as needed for high blood pressure       VITAMIN D, CHOLECALCIFEROL, PO Take 5,000 Units by mouth daily       B Complex-C-Folic Acid (RENAL) 1 MG CAPS Take 1 capsule by mouth daily       pentoxifylline (TRENTAL) 400 MG CR tablet Take 400 mg by mouth daily       warfarin (COUMADIN) 2 MG tablet 1 mg daily for aflut and adjust for desired INR 2.0 to 3.0 30 tablet      LORazepam (ATIVAN) 0.5 MG tablet Take 0.5 tablets (0.25 mg) by mouth every 8 hours as needed for anxiety 20 tablet 0     hypromellose-dextran (ARTIFICAL TEARS) SOLN ophthalmic solution Place 1 drop into both eyes 3 times daily as needed for dry eyes       famotidine (PEPCID) 20 MG tablet Take 1 tablet (20 mg) by mouth 2 times daily as needed 60 tablet 1     AMIODARONE HCL PO Take 200 mg by mouth 2 times daily        pramipexole (MIRAPEX) 0.125 MG tablet Take 1 tablet (0.125  mg) by mouth 3 times daily 90 tablet 3     fluticasone (FLONASE) 50 MCG/ACT spray Spray 1 spray into both nostrils daily 1 Bottle 0     ipratropium - albuterol 0.5 mg/2.5 mg/3 mL (DUONEB) 0.5-2.5 (3) MG/3ML neb solution Take 1 vial by nebulization every 6 hours as needed        ACETAMINOPHEN PO Take 650 mg by mouth every 4 hours as needed for pain For pain 1-5 out of 10       nitroGLYcerin (NITROSTAT) 0.4 MG sublingual tablet Place 1 tablet (0.4 mg) under the tongue every 5 minutes as needed for chest pain 25 tablet 0     pravastatin (PRAVACHOL) 40 MG tablet Take 1 tablet (40 mg) by mouth daily 90 tablet 3     calcium acetate (PHOSLO) 667 MG CAPS capsule Take 1 capsule (667 mg) by mouth 3 times daily (with meals) 180 capsule      ORDER FOR DME Equipment being ordered: cpap machine, mask, humidifier, tubing and filters 1 Device 0       MEDICATION CHANGES/RATIONALE:   New meds this hospitalization:  Pepcid - Ativan - Metoprolol     Metoprolol titrated up d/t HTN    Controlled medications sent with patient: Medication: lorazepam 0.25 mg , 21 tabs given to patient at the time of discharge to take home     ROS:    10 point ROS of systems including Constitutional, Eyes, Respiratory, Cardiovascular, Gastroenterology, Genitourinary, Integumentary, Muscularskeletal, Psychiatric were all negative except for pertinent positives noted in my HPI.    Physical Exam:   Vitals: /76  Pulse 73  Temp 97.2  F (36.2  C)  Resp 20  Wt 151 lb (68.5 kg)  SpO2 95%  BMI 25.92 kg/m2  BMI= Body mass index is 25.92 kg/(m^2).    GENERAL APPEARANCE:  Alert, in no distress  RESP:  respiratory effort and palpation of chest normal, auscultation of lungs clear , no respiratory distress  CV:  Palpation and auscultation of heart done , rate and rhythm regular, no murmur, mild LE peripheral edema  ABDOMEN:  normal bowel sounds, soft, nontender, no hepatosplenomegaly or other masses  M/S:   Gait and station ambulatory with walker (in WC at  today's visit), Digits and nails with arthritic changes/baseline,reduced muscle mass  SKIN:  Inspection and Palpation of skin and subcutaneous tissue pale, thin, some bruising  PSYCH:  insight and judgement, memory intact , affect and mood normal, follows commands readily         DISCHARGE PLAN:  Occupational Therapy, Physical Therapy, Registered Nurse and Home Health Aide  Patient instructed to follow-up with:  PCP in 7 days      Current [place] scheduled appointments:  Future Appointments  Date Time Provider Department Center   11/21/2017 12:00 PM Delia Christine APRN CNP [facility]   12/5/2017 10:30 AM Dheeraj Jj MD [facility]   12/5/2017 12:30 PM LAB FIRST FLOOR [facility]   12/5/2017 1:00 PM Vibha Barfield MD [facility] referral needed and placed by this provider: No    Pending labs: None    SNF labs   CBC RESULTS:   Recent Labs   Lab Test  10/27/17   1000  10/26/17   0734   WBC  6.7  5.9   RBC  3.33*  3.26*   HGB  9.2*  8.9*   HCT  30.2*  29.4*   MCV  91  90   MCH  27.6  27.3   MCHC  30.5*  30.3*   RDW  15.9*  15.9*   PLT  320  310       Last Basic Metabolic Panel:  Recent Labs   Lab Test  11/01/17   0915  10/31/17   0745   NA  142  140   POTASSIUM  4.2  3.6   CHLORIDE  108  105   MALICK  7.1*  7.0*   CO2  27  27   BUN  8  4*   CR  2.85*  2.35*   GLC  144*  167*       Liver Function Studies -   Recent Labs   Lab Test  10/22/17   0305  10/16/17   0545  10/12/17   1040  10/12/17   0456   PROTTOTAL  6.2*   --   5.1*  5.0*   ALBUMIN  2.5*  2.2*   --   2.6*   BILITOTAL  0.3   --    --   0.4   ALKPHOS  180*   --    --   128   AST  14   --    --   9   ALT  9   --    --   8       TSH   Date Value Ref Range Status   10/11/2017 5.94 (H) 0.40 - 4.00 mU/L Final   04/28/2017 0.75 0.40 - 4.00 mU/L Final   ]    Lab Results   Component Value Date    A1C 6.6 10/13/2017    A1C 7.3 06/22/2017       Discharge Treatments:INR draw 11/22 by Home Care RN (note that  patient has HD from [id number].  If [facility] able to draw INR off circuit, OK to do so and notify PRIMARY CARE PROVIDER/INR clinic for Couamdin orders).     TOTAL DISCHARGE TIME:   Greater than 30 minutes  Electronically signed by:  RHODA Bowden CNP           Documentation of Face-to-Face and Certification for Home Health Services     Patient: Ktahryn Banks   YOB: 1940  MR Number: [id number]  Today's Date: 11/21/2017    I certify that patient: Kathryn Banks is under my care and that I, or a nurse practitioner or physician's assistant working with me, had a face-to-face encounter that meets the physician face-to-face encounter requirements with this patient on: 11/21/2017.    This encounter with the patient was in whole, or in part, for the following medical condition, which is the primary reason for home health care: weakness, SBO (resolved), CKD on HD, CHF, HTN, impaired mobility.    I certify that, based on my findings, the following services are medically necessary home health services: Nursing, Occupational Therapy and Physical Therapy.    My clinical findings support the need for the above services because: Nurse is needed: To assess VS and draw labs  after changes in medications or other medical regimen.., Occupational Therapy Services are needed to assess and treat cognitive ability and address ADL safety due to impairment in impaired ADLs d/t physical deconditioning and prolonged illness. and Physical Therapy Services are needed to assess and treat the following functional impairments: impaired mobility, weakness d/t prolonged illness. .    Further, I certify that my clinical findings support that this patient is homebound (i.e. absences from home require considerable and taxing effort and are for medical reasons or Muslim services or infrequently or of short duration when for other reasons) because: Requires assistance of another person or specialized equipment to access  medical services because patient: Is unable to operate assistive equipment on their own., Is unable to walk greater than 140 feet without rest. and Range of motion limitations prevents ability to exit home safely...    Based on the above findings. I certify that this patient is confined to the home and needs intermittent skilled nursing care, physical therapy and/or speech therapy.  The patient is under my care, and I have initiated the establishment of the plan of care.  This patient will be followed by a physician who will periodically review the plan of care.  Physician/Provider to provide follow up care: Dheeraj Jj    Responsible Medicare certified PECOS Physician: Dr. Cisneros  Physician Signature: See electronic signature associated with these discharge orders.  Date: 11/21/2017

## 2017-11-22 ENCOUNTER — TELEPHONE (OUTPATIENT)
Dept: INTERNAL MEDICINE | Facility: CLINIC | Age: 77
End: 2017-11-22

## 2017-11-22 NOTE — TELEPHONE ENCOUNTER
Reason for Call:  Other call back    Detailed comments: Home care requesting delay of start of home care until Saturday, November 25th due to dialysis Monday, Wednesday, Friday and Thanksgiving patient didn't want anyone.    Phone Number Patient can be reached at: Other phone number:  [phone]    Best Time: anytim    Can we leave a detailed message on this number? YES    Call taken on 11/22/2017 at 4:10 PM by Mehnaz Guallpa

## 2017-11-25 ENCOUNTER — TELEPHONE (OUTPATIENT)
Dept: CARE COORDINATION | Facility: CLINIC | Age: 77
End: 2017-11-25

## 2017-11-25 NOTE — TELEPHONE ENCOUNTER
Ok to open patient to [facility] services for skilled nursing for medication management, CHF assess, nutritional assess and educaiton  PT, OT and HHA for bathing and personal cares    OK to draw INR 11/27/17 or 11/28/17, was supposed to be done 11/22/17 but dialysis does not draw INRs    Was prescribed amiodarone 200mg 2 x day per DC instructions but does not have,  Uses [facility] pharmacy please call in if indicated     Per med rec warfarin and amiodarone have potential interactions,  And albuterol/ipratropium was on DC roger for as needed but does not have and  Also does not have a NEB      Arielle Pantoja RN  [facility]   [phone]    [facility] utilizes an encounter to take the place of a direct phone call to your office. Please take a moment to review the below request. Your reply to this encounter will act as a verbal OK of orders if requested below. Thank you.

## 2017-11-27 ENCOUNTER — TELEPHONE (OUTPATIENT)
Dept: FAMILY MEDICINE | Facility: CLINIC | Age: 77
End: 2017-11-27

## 2017-11-27 ENCOUNTER — CARE COORDINATION (OUTPATIENT)
Dept: CARE COORDINATION | Facility: CLINIC | Age: 77
End: 2017-11-27

## 2017-11-27 ENCOUNTER — ANTICOAGULATION THERAPY VISIT (OUTPATIENT)
Dept: ANTICOAGULATION | Facility: CLINIC | Age: 77
End: 2017-11-27
Payer: COMMERCIAL

## 2017-11-27 ENCOUNTER — DOCUMENTATION ONLY (OUTPATIENT)
Dept: CARE COORDINATION | Facility: CLINIC | Age: 77
End: 2017-11-27

## 2017-11-27 LAB — INR PPP: 1.2

## 2017-11-27 PROCEDURE — 99207 ZZC NO CHARGE NURSE ONLY: CPT | Performed by: INTERNAL MEDICINE

## 2017-11-27 NOTE — MR AVS SNAPSHOT
Kathryn MICKY Banks   11/27/2017   Anticoagulation Therapy Visit    Description:  77 year old female   Provider:  Dheeraj Jj MD   Department:  Ph Anticoag           INR as of 11/27/2017     Today's INR 1.2!      Anticoagulation Summary as of 11/27/2017     INR goal 2.0-3.0   Today's INR 1.2!   Full instructions 11/27: 2 mg; 11/28: 1 mg; 11/29: 2 mg; Otherwise No maintenance plan   Next INR check 11/30/2017    Indications   Atrial fibrillation (H) (Resolved) [I48.91]  Long-term (current) use of anticoagulants [Z79.01] (Resolved) [Z79.01]         Contact Numbers     Clinic Number:         November 2017 Details    Sun Mon Tue Wed Thu Fri Sat        1               2               3               4                 5               6               7               8               9               10               11                 12               13               14               15               16               17               18                 19               20               21               22               23               24               25                 26               27      2 mg   See details      28      1 mg         29      2 mg         30               Date Details   11/27 This INR check       Date of next INR:  11/30/2017         How to take your warfarin dose     To take:  1 mg Take 1 of the 1 mg tablets.    To take:  2 mg Take 2 of the 1 mg tablets.

## 2017-11-27 NOTE — TELEPHONE ENCOUNTER
Reason for Call:  INR    Who is calling?  Home Care:     Phone number:      Fax number:      Name of caller:     INR Value:  1.2     Are there any other concerns:  Yes: missed a dose on Friday (has been taking 1 mg)    Can we leave a detailed message on this number? YES    Phone number patient can be reached at: Other phone number:  [phone]*      Call taken on 11/27/2017 at 8:14 AM by Cristian Ortiz

## 2017-11-27 NOTE — PROGRESS NOTES
Continue those orders, needs to be seen at some point. Continue to watch weight, get elevation, low salt diet and will have to see if dialysis can pull off more fluid.

## 2017-11-27 NOTE — PROGRESS NOTES
"Anisa is calling from [facility] to request clarification of this order. It states patient is to \"continue diuretics\" but she says Kathryn is not on one. Please advise to  [phone] confidential voice mail.     Thank you. Vinay Najera, Patient Representative    "

## 2017-11-27 NOTE — PROGRESS NOTES
[facility] now requests orders and shares plan of care/discharge summaries for some patients through [employer].  Please REPLY TO THIS MESSAGE in order to give authorization for orders when needed.  This is considered a verbal order, you will still receive a faxed copy of orders for signature.  Thank you for your assistance in improving collaboration for our patients.    Please read and advise    Pt was seen today, had 2 lb wt gain since yesterday 11/26, 9 lbs since discharge from [facility] on Tuesday 11/21 (although could be difference in scales).      2 new wounds open, Right lower extremity measures 0.8x0.5cm, mod amount of serous drainage noted.  Left lower extremity measures 0.3x0.3, mod amount of serous drainage noted.    Completed following wound care,cleansed with wound cleanser, applied abd pad, wrapped with kerlix, secured with tape, please advise if to continue with these orders or if you would like new orders.    Pt does not have any amiodarone in home, orders state to take 200mg BID.    Thanks,  Anisa Licea RN    [email or web address]

## 2017-11-28 NOTE — PROGRESS NOTES
Clinic Care Coordination Contact  Care Coordination Communication    Referral Source: [facility]    Clinical Data: Patient was at [facility] after a hospitalization. Pt was discharged on 11/21 with home care.     Home Care Contact:              Home Care Agency: [facility]              Contact name () and phone number: Arielle Pantoja [phone]              Care Coordination contacted home care: Yes              Anticipated start of care date: 11/26    Patient Contact:               Introduced self and role of care coordination.               Patient questions/concerns: spoke with granddaughter and states pt is currently with [facility] and has had RN already out. No questions at this time.     Plan: RN/SW Care Coordinator will await notification from home care staff informing RN/SW Care Coordinator of patients discharge plans/needs. RN/SW Care Coordinator will review chart and outreach to home care every 4 weeks and as needed.      Meenakshi REYNAGA, RN, PHN  Care Coordination    [facility]  [address] [place] [address]  Office: [phone]  Fax [phone]   [facility]  [address] [place] [address]  Office: [phone] Fax [phone]  [email or web address]   [email or web address]   Connect with [facility] on social media.

## 2017-11-30 ENCOUNTER — DOCUMENTATION ONLY (OUTPATIENT)
Dept: CARE COORDINATION | Facility: CLINIC | Age: 77
End: 2017-11-30

## 2017-11-30 ENCOUNTER — TELEPHONE (OUTPATIENT)
Dept: INTERNAL MEDICINE | Facility: CLINIC | Age: 77
End: 2017-11-30

## 2017-11-30 ENCOUNTER — ANTICOAGULATION THERAPY VISIT (OUTPATIENT)
Dept: ANTICOAGULATION | Facility: CLINIC | Age: 77
End: 2017-11-30
Payer: COMMERCIAL

## 2017-11-30 DIAGNOSIS — I48.0 PAROXYSMAL ATRIAL FIBRILLATION (H): ICD-10-CM

## 2017-11-30 DIAGNOSIS — Z99.2 ESRD (END STAGE RENAL DISEASE) ON DIALYSIS (H): Primary | ICD-10-CM

## 2017-11-30 DIAGNOSIS — N18.6 ESRD (END STAGE RENAL DISEASE) ON DIALYSIS (H): Primary | ICD-10-CM

## 2017-11-30 DIAGNOSIS — I48.92 ATRIAL FLUTTER, UNSPECIFIED TYPE (H): ICD-10-CM

## 2017-11-30 DIAGNOSIS — Z79.01 LONG-TERM (CURRENT) USE OF ANTICOAGULANTS: ICD-10-CM

## 2017-11-30 DIAGNOSIS — I48.92 ATRIAL FLUTTER, UNSPECIFIED TYPE (H): Primary | ICD-10-CM

## 2017-11-30 LAB — INR PPP: 1.4

## 2017-11-30 PROCEDURE — 99207 ZZC NO BILLABLE SERVICE THIS VISIT: CPT | Performed by: INTERNAL MEDICINE

## 2017-11-30 RX ORDER — WARFARIN SODIUM 1 MG/1
TABLET ORAL
Qty: 60 TABLET | Refills: 0 | Status: SHIPPED | OUTPATIENT
Start: 2017-11-30 | End: 2017-12-26

## 2017-11-30 RX ORDER — WARFARIN SODIUM 2.5 MG/1
TABLET ORAL
Qty: 180 TABLET | Refills: 0 | OUTPATIENT
Start: 2017-11-30

## 2017-11-30 NOTE — TELEPHONE ENCOUNTER
Requested Prescriptions   Pending Prescriptions Disp Refills     warfarin (COUMADIN) 2.5 MG tablet [Pharmacy Med Name: WARFARIN 2.5MG      TAB] 180 tablet 0     Sig: TAKE 7.5MG (3 TABLETS) ON FRIDAY AND 5 MG (2TABS) ALL OTHER DAYS OF THE WEEK    Vitamin K Antagonists Failed    11/30/2017 10:09 AM       Failed - INR is within goal in the past 6 weeks    Confirm INR is within goal in the past 6 weeks.     Recent Labs   Lab Test 11/30/17   INR  1.4                      Passed - Recent or future visit with authorizing provider's specialty    Patient had office visit in the last year or has a visit in the next 30 days with authorizing provider.  See chart review.              Passed - Patient is 18 years of age or older       Passed - Patient is not pregnant       Passed - No positive pregnancy on file in past 12 months

## 2017-11-30 NOTE — TELEPHONE ENCOUNTER
Reason for Call:  INR    Who is calling?  Home Care: [facility]    Phone number:  [phone]    Fax number:      Name of caller: Anisa    INR Value:  1.4    Are there any other concerns:  No    Can we leave a detailed message on this number? YES    Phone number patient can be reached at: Home number on file [phone] (home)      Call taken on 11/30/2017 at 9:48 AM by Jo-Ann Bartlett

## 2017-11-30 NOTE — PROGRESS NOTES
[facility] now requests orders and shares plan of care/discharge summaries for some patients through [employer].  Please REPLY TO THIS MESSAGE in order to give authorization for orders when needed.  This is considered a verbal order, you will still receive a faxed copy of orders for signature.  Thank you for your assistance in improving collaboration for our patients.    Please read and verify med orders    Per pt, dietitian at dialysis instructed pt to stop taking Calcium acetate. Is this ok?    Pt was prescribed amiodarone 200mg BID while in nursing home, still does not have any in home, would you like her to continue this medication?  BP has been slightly increased 162/68 today.    Can you please send refill for nephrocaps 1mg qd to [facility] pharmacy?  They will not fill without new script.    Thank you,  Anisa Licea RN    [email or web address]

## 2017-11-30 NOTE — PROGRESS NOTES
[facility] now requests orders and shares plan of care/discharge summaries for some patients through [email or web address].  Please REPLY TO THIS MESSAGE in order to give authorization for orders when needed.  This is considered a verbal order, you will still receive a faxed copy of orders for signature.  Thank you for your assistance in improving collaboration for our patients.    ORDER    SN 2w8    Increase in SN services beginning week of 12/3/17 due to frequent INRs, wound care and medication problems.    Thanks,  Anisa Licea RN    [email or web address]

## 2017-11-30 NOTE — TELEPHONE ENCOUNTER
Prescription was denied per RN refill protocol. Refill is a duplicate. Pt had 1 mg tablets called in today. Gianni Palumbo, RN, BSN

## 2017-11-30 NOTE — PROGRESS NOTES
ANTICOAGULATION FOLLOW-UP CLINIC VISIT    Patient Name:  Kathryn Banks  Date:  11/30/2017  Contact Type:  Telephone    SUBJECTIVE:     Patient Findings     Positives Unexplained INR or factor level change    Comments Reason for Call:  INR     Who is calling?  Home Care: [facility]     Phone number:  [phone]     Fax number:       Name of caller: Anisa     INR Value:  1.4     Are there any other concerns:  No     Can we leave a detailed message on this number? YES           OBJECTIVE    INR   Date Value Ref Range Status   11/30/2017 1.4  Final       ASSESSMENT / PLAN  INR assessment SUB    Recheck INR In: 4 DAYS    INR Location Homecare INR      Anticoagulation Summary as of 11/30/2017     INR goal 2.0-3.0   Today's INR 1.4!   Maintenance plan No maintenance plan   Full instructions 11/30: 2 mg; 12/1: 2 mg; 12/2: 1 mg; 12/3: 1 mg; Otherwise No maintenance plan   Next INR check 12/4/2017   Target end date     Indications   Atrial fibrillation (H) (Resolved) [I48.91]  Long-term (current) use of anticoagulants [Z79.01] (Resolved) [Z79.01]         Anticoagulation Episode Summary     INR check location     Preferred lab     Send INR reminders to [facility] POOL    Comments 1 mg (as of 11/27, per  nurse)        Anticoagulation Care Providers     Provider Role Specialty Phone number    Dheeraj Jj MD [facility] Internal Medicine [phone]            See the Encounter Report to view Anticoagulation Flowsheet and Dosing Calendar (Go to Encounters tab in chart review, and find the Anticoagulation Therapy Visit)    Dosage adjustment made based on physician directed care plan.    Nicki Calvillo RN

## 2017-12-01 RX ORDER — AMIODARONE HYDROCHLORIDE 100 MG/1
200 TABLET ORAL 2 TIMES DAILY
Qty: 120 TABLET | Refills: 0 | Status: SHIPPED | OUTPATIENT
Start: 2017-12-01 | End: 2017-12-20

## 2017-12-01 NOTE — PROGRESS NOTES
[facility] now requests orders and shares plan of care/discharge summaries for some patients through [employer].  Please REPLY TO THIS MESSAGE in order to give authorization for orders when needed.  This is considered a verbal order, you will still receive a faxed copy of orders for signature.  Thank you for your assistance in improving collaboration for our patients.    Can you please send script or call to [facility] pharmacy for amiodarone 200mg PO BID?    Thanks,   Anisa Licea RN    [email or web address]

## 2017-12-04 ENCOUNTER — ANTICOAGULATION THERAPY VISIT (OUTPATIENT)
Dept: ANTICOAGULATION | Facility: CLINIC | Age: 77
End: 2017-12-04
Payer: COMMERCIAL

## 2017-12-04 ENCOUNTER — TELEPHONE (OUTPATIENT)
Dept: INTERNAL MEDICINE | Facility: CLINIC | Age: 77
End: 2017-12-04

## 2017-12-04 LAB — INR PPP: 1.4

## 2017-12-04 PROCEDURE — 99207 ZZC NO CHARGE NURSE ONLY: CPT | Performed by: INTERNAL MEDICINE

## 2017-12-04 NOTE — PROGRESS NOTES
ANTICOAGULATION FOLLOW-UP CLINIC VISIT    Patient Name:  Kathryn Banks  Date:  12/4/2017  Contact Type:  Telephone/ Anisa  nurse    SUBJECTIVE:     Patient Findings     Positives Change in diet/appetite (pt had some spinach yesterday )    Comments Reason for Call:  INR     Who is calling?  Home Care:      Phone number:  [phone]     Fax number:       Name of caller: Anisa     INR Value:  1.4, did eat some spinach yest     Are there any other concerns:  No     Can we leave a detailed message on this number? YES     Phone number patient can be reached at: Other phone number: [phone]         Call taken on 12/4/2017 at 8:17 AM by Josephine Tejeda              OBJECTIVE    INR   Date Value Ref Range Status   12/04/2017 1.4  Final       ASSESSMENT / PLAN  INR assessment SUB    Recheck INR In: 3 DAYS    INR Location Homecare INR      Anticoagulation Summary as of 12/4/2017     INR goal 2.0-3.0   Today's INR 1.4!   Maintenance plan No maintenance plan   Full instructions 12/4: 3 mg; 12/5: 2 mg; 12/6: 2 mg; Otherwise No maintenance plan   Next INR check 12/7/2017   Target end date     Indications   Atrial fibrillation (H) (Resolved) [I48.91]  Long-term (current) use of anticoagulants [Z79.01] (Resolved) [Z79.01]         Anticoagulation Episode Summary     INR check location     Preferred lab     Send INR reminders to [facility] POOL    Comments 1 mg, 2.5 mg, and 3 mg tabs (as of 11/27, per HC nurse)        Anticoagulation Care Providers     Provider Role Specialty Phone number    Dheeraj Jj MD [facility] Internal Medicine [phone]            See the Encounter Report to view Anticoagulation Flowsheet and Dosing Calendar (Go to Encounters tab in chart review, and find the Anticoagulation Therapy Visit)    Dosage adjustment made based on physician directed care plan.      Zo Davis RN

## 2017-12-04 NOTE — TELEPHONE ENCOUNTER
Reason for Call:  INR    Who is calling?  Home Care:     Phone number:  [phone]    Fax number:      Name of caller: Anisa    INR Value:  1.4, did eat some spinach yest    Are there any other concerns:  No    Can we leave a detailed message on this number? YES    Phone number patient can be reached at: Other phone number: [phone]       Call taken on 12/4/2017 at 8:17 AM by Josephine Tejeda

## 2017-12-04 NOTE — MR AVS SNAPSHOT
Kathryn MICKY Banks   12/4/2017   Anticoagulation Therapy Visit    Description:  77 year old female   Provider:  Dheeraj Jj MD   Department:  Ph Anticoag           INR as of 12/4/2017     Today's INR 1.4!      Anticoagulation Summary as of 12/4/2017     INR goal 2.0-3.0   Today's INR 1.4!   Full instructions 12/4: 3 mg; 12/5: 2 mg; 12/6: 2 mg; Otherwise No maintenance plan   Next INR check 12/7/2017    Indications   Atrial fibrillation (H) (Resolved) [I48.91]  Long-term (current) use of anticoagulants [Z79.01] (Resolved) [Z79.01]         Contact Numbers     Clinic Number:         December 2017 Details    Sun Mon Tue Wed Thu Fri Sat          1               2                 3               4      3 mg   See details      5      2 mg         6      2 mg         7            8               9                 10               11               12               13               14               15               16                 17               18               19               20               21               22               23                 24               25               26               27               28               29               30                 31                      Date Details   12/04 This INR check       Date of next INR:  12/7/2017         How to take your warfarin dose     To take:  2 mg Take 2 of the 1 mg tablets.    To take:  3 mg Take 3 of the 1 mg tablets.

## 2017-12-07 ENCOUNTER — TELEPHONE (OUTPATIENT)
Dept: FAMILY MEDICINE | Facility: CLINIC | Age: 77
End: 2017-12-07

## 2017-12-07 ENCOUNTER — ANTICOAGULATION THERAPY VISIT (OUTPATIENT)
Dept: ANTICOAGULATION | Facility: CLINIC | Age: 77
End: 2017-12-07

## 2017-12-07 ENCOUNTER — TELEPHONE (OUTPATIENT)
Dept: VASCULAR SURGERY | Facility: CLINIC | Age: 77
End: 2017-12-07

## 2017-12-07 ENCOUNTER — TELEPHONE (OUTPATIENT)
Dept: CARDIOLOGY | Facility: CLINIC | Age: 77
End: 2017-12-07

## 2017-12-07 ENCOUNTER — DOCUMENTATION ONLY (OUTPATIENT)
Dept: CARE COORDINATION | Facility: CLINIC | Age: 77
End: 2017-12-07

## 2017-12-07 DIAGNOSIS — I48.0 PAROXYSMAL ATRIAL FIBRILLATION (H): Primary | ICD-10-CM

## 2017-12-07 LAB — INR PPP: 1.6

## 2017-12-07 RX ORDER — DILTIAZEM HYDROCHLORIDE 240 MG/1
240 CAPSULE, COATED, EXTENDED RELEASE ORAL DAILY
Qty: 90 CAPSULE | Refills: 0 | Status: SHIPPED | OUTPATIENT
Start: 2017-12-07 | End: 2018-04-11

## 2017-12-07 NOTE — PROGRESS NOTES
ANTICOAGULATION FOLLOW-UP CLINIC VISIT    Patient Name:  Kathryn Banks  Date:  12/7/2017  Contact Type:  Telephone    SUBJECTIVE:     Patient Findings     Positives Unexplained INR or factor level change    Comments S/O:  Anisa from home care calls with pt's INR today of 1.6.  Kathryn Banks is on Coumadin for A. Fib.  Current Coumadin dose is Thurs 2 mg, Fri 2 mg, Sat 1 mg, Sun 1 mg, Mon 3 mg, Tue 2 mg and Wed 2 mg=13 mg.   Concerns today are: None Noted.    A/P: Kathryn Dunns INR is Subtherapeutic  for his/her goal range of 2 - 3.  Reasons why INR is out of range may include:unknown.  Recommended to have Kathryn Banks increase Coumadin dose to 3 mg Thurs, 2 mg Fri, 2 mg Sat and 1 mg Sun (=15 mg/wk) and to have his/her INR rechecked in 4 days on 12/11/17.      Yovani Ruvalcaba RN, BSN               OBJECTIVE    INR   Date Value Ref Range Status   12/07/2017 1.6  Final       ASSESSMENT / PLAN  INR assessment SUB    Recheck INR In: 4 DAYS    INR Location Homecare INR      Anticoagulation Summary as of 12/7/2017     INR goal 2.0-3.0   Today's INR 1.6!   Maintenance plan No maintenance plan   Full instructions 12/7: 3 mg; 12/8: 2 mg; 12/9: 2 mg; 12/10: 1 mg; Otherwise No maintenance plan   Next INR check 12/11/2017   Target end date     Indications   Atrial fibrillation (H) (Resolved) [I48.91]  Long-term (current) use of anticoagulants [Z79.01] (Resolved) [Z79.01]         Anticoagulation Episode Summary     INR check location     Preferred lab     Send INR reminders to [facility] POOL    Comments 1 mg, 2.5 mg, and 3 mg tabs (as of 11/27, per HC nurse)        Anticoagulation Care Providers     Provider Role Specialty Phone number    Dheeraj Jj MD [facility] Internal Medicine [phone]            See the Encounter Report to view Anticoagulation Flowsheet and Dosing Calendar (Go to Encounters tab in chart review, and find the Anticoagulation Therapy Visit)    Dosage adjustment made based on physician  directed care plan.    Yovani Ruvalcaba RN

## 2017-12-07 NOTE — PROGRESS NOTES
[facility] now requests orders and shares plan of care/discharge summaries for some patients through [employer].  Please REPLY TO THIS MESSAGE in order to give authorization for orders when needed.  This is considered a verbal order, you will still receive a faxed copy of orders for signature.  Thank you for your assistance in improving collaboration for our patients.    Pt is unable to afford her prescription for amiodarone, it was over 200 dollars.  Is there another medication that pt could take in place of this?      Pts right middle finger is reddened, painful ache with movement rated at 3-4/10, and swollen. Rest of right hand also appears swollen.  She does not remember hurting it or bumping it.    Please advise.    Thanks,     Anisa Licea RN    [email or web address]

## 2017-12-07 NOTE — MR AVS SNAPSHOT
Kathryn MICKY Banks   12/7/2017   Anticoagulation Therapy Visit    Description:  77 year old female   Provider:  Dheeraj Jj MD   Department:  Ph Anticoag           INR as of 12/7/2017     Today's INR 1.6!      Anticoagulation Summary as of 12/7/2017     INR goal 2.0-3.0   Today's INR 1.6!   Full instructions 12/7: 3 mg; 12/8: 2 mg; 12/9: 2 mg; 12/10: 1 mg; Otherwise No maintenance plan   Next INR check 12/11/2017    Indications   Atrial fibrillation (H) (Resolved) [I48.91]  Long-term (current) use of anticoagulants [Z79.01] (Resolved) [Z79.01]         Contact Numbers     Clinic Number:         December 2017 Details    Sun Mon Tue Wed Thu Fri Sat          1               2                 3               4               5               6               7      3 mg   See details      8      2 mg         9      2 mg           10      1 mg         11            12               13               14               15               16                 17               18               19               20               21               22               23                 24               25               26               27               28               29               30                 31                      Date Details   12/07 This INR check       Date of next INR:  12/11/2017         How to take your warfarin dose     To take:  1 mg Take 1 of the 1 mg tablets.    To take:  2 mg Take 2 of the 1 mg tablets.    To take:  3 mg Take 3 of the 1 mg tablets.

## 2017-12-07 NOTE — TELEPHONE ENCOUNTER
Received call from Anisa patient's home care RN. Anisa reports that amiodarone is too expensive for patient and is wondering if there is an alternative. Chart reviewed with Dr. King in cardiology. Recommendation per Dr. King: do not take amiodarone and stop hydralazine. Start Cardizem 240 mg/day for rate control and follow up prn. Will route to primary care provider to inform of medication change. Anisa contacted and informed of change, understanding verbalized. Order sent to patient's pharmacy.   Corrie Briceño RN

## 2017-12-07 NOTE — TELEPHONE ENCOUNTER
Reason for Call:  INR    Who is calling?  Home Care: [facility]    Phone number:  [phone]    Fax number:  NA    Name of caller: Anisa    INR Value:  1.6    Are there any other concerns:  Yes: please call with dosing instructions.  Please call as soon as possible, she is with patient and needs to set up medications.    Can we leave a detailed message on this number? YES    Phone number patient can be reached at: Home number on file [phone] (home)      Call taken on 12/7/2017 at 8:34 AM by Joyce Cummings

## 2017-12-07 NOTE — PROGRESS NOTES
I can't treat her finger over the computer. She missed her appt yesterday.      Has to ask cardiology about the amiodarone as that is their drug.

## 2017-12-07 NOTE — TELEPHONE ENCOUNTER
Per task, call pt and schedule them for surgery with Dr. Cardenas. Talked with pt and offered her 1/2 at 830am . Pt ok with that. Will mail letter. Pt also had questions about the surgery. Send message to nurse requesting call back

## 2017-12-08 ENCOUNTER — DOCUMENTATION ONLY (OUTPATIENT)
Dept: CARE COORDINATION | Facility: CLINIC | Age: 77
End: 2017-12-08

## 2017-12-08 NOTE — PROGRESS NOTES
[facility] now requests orders and shares plan of care/discharge summaries for some patients through [employer].  Please REPLY TO THIS MESSAGE in order to give authorization for orders when needed.  This is considered a verbal order, you will still receive a faxed copy of orders for signature.  Thank you for your assistance in improving collaboration for our patients.    ORDER    Can we get order for telemonitoring as available?    Thanks,  Anisa Licea RN    [email or web address]

## 2017-12-09 ENCOUNTER — APPOINTMENT (OUTPATIENT)
Dept: GENERAL RADIOLOGY | Facility: CLINIC | Age: 77
End: 2017-12-09
Attending: FAMILY MEDICINE
Payer: MEDICARE

## 2017-12-09 ENCOUNTER — HOSPITAL ENCOUNTER (EMERGENCY)
Facility: CLINIC | Age: 77
Discharge: HOME | End: 2017-12-09
Attending: FAMILY MEDICINE | Admitting: FAMILY MEDICINE
Payer: MEDICARE

## 2017-12-09 ENCOUNTER — NURSE TRIAGE (OUTPATIENT)
Dept: NURSING | Facility: CLINIC | Age: 77
End: 2017-12-09

## 2017-12-09 VITALS
BODY MASS INDEX: 26.26 KG/M2 | SYSTOLIC BLOOD PRESSURE: 148 MMHG | WEIGHT: 153 LBS | HEART RATE: 73 BPM | TEMPERATURE: 97.3 F | RESPIRATION RATE: 20 BRPM | OXYGEN SATURATION: 92 % | DIASTOLIC BLOOD PRESSURE: 79 MMHG

## 2017-12-09 DIAGNOSIS — M79.89 SWELLING OF RIGHT HAND: ICD-10-CM

## 2017-12-09 PROBLEM — I50.9 ACUTE DECOMPENSATED HEART FAILURE (H): Status: ACTIVE | Noted: 2017-02-03

## 2017-12-09 PROBLEM — I95.81 POSTPROCEDURAL HYPOTENSION: Status: ACTIVE | Noted: 2017-03-09
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
## 2017-12-09 PROBLEM — E11.8 TYPE 2 DIABETES MELLITUS WITH COMPLICATION (H): Status: ACTIVE | Noted: 2017-02-03
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
## 2017-12-09 LAB
ANION GAP SERPL CALCULATED.3IONS-SCNC: 7 MMOL/L (ref 3–14)
BASOPHILS # BLD AUTO: 0.1 10E9/L (ref 0–0.2)
BASOPHILS NFR BLD AUTO: 0.5 %
BUN SERPL-MCNC: 25 MG/DL (ref 7–30)
CALCIUM SERPL-MCNC: 7.1 MG/DL (ref 8.5–10.1)
CHLORIDE SERPL-SCNC: 113 MMOL/L (ref 94–109)
CO2 SERPL-SCNC: 25 MMOL/L (ref 20–32)
CREAT SERPL-MCNC: 2.56 MG/DL (ref 0.52–1.04)
CRP SERPL-MCNC: 8.1 MG/L (ref 0–8)
DIFFERENTIAL METHOD BLD: ABNORMAL
EOSINOPHIL # BLD AUTO: 0.2 10E9/L (ref 0–0.7)
EOSINOPHIL NFR BLD AUTO: 1.6 %
ERYTHROCYTE [DISTWIDTH] IN BLOOD BY AUTOMATED COUNT: 21.9 % (ref 10–15)
ERYTHROCYTE [SEDIMENTATION RATE] IN BLOOD BY WESTERGREN METHOD: 41 MM/H (ref 0–30)
GFR SERPL CREATININE-BSD FRML MDRD: 18 ML/MIN/1.7M2
GLUCOSE SERPL-MCNC: 132 MG/DL (ref 70–99)
HCT VFR BLD AUTO: 30.8 % (ref 35–47)
HGB BLD-MCNC: 9 G/DL (ref 11.7–15.7)
IMM GRANULOCYTES # BLD: 0.1 10E9/L (ref 0–0.4)
IMM GRANULOCYTES NFR BLD: 0.5 %
LYMPHOCYTES # BLD AUTO: 1.5 10E9/L (ref 0.8–5.3)
LYMPHOCYTES NFR BLD AUTO: 13.7 %
MCH RBC QN AUTO: 28.7 PG (ref 26.5–33)
MCHC RBC AUTO-ENTMCNC: 29.2 G/DL (ref 31.5–36.5)
MCV RBC AUTO: 98 FL (ref 78–100)
MONOCYTES # BLD AUTO: 0.8 10E9/L (ref 0–1.3)
MONOCYTES NFR BLD AUTO: 7 %
NEUTROPHILS # BLD AUTO: 8.4 10E9/L (ref 1.6–8.3)
NEUTROPHILS NFR BLD AUTO: 76.7 %
PLATELET # BLD AUTO: 255 10E9/L (ref 150–450)
POTASSIUM SERPL-SCNC: 4 MMOL/L (ref 3.4–5.3)
RBC # BLD AUTO: 3.14 10E12/L (ref 3.8–5.2)
SODIUM SERPL-SCNC: 145 MMOL/L (ref 133–144)
URATE SERPL-MCNC: 4.4 MG/DL (ref 2.6–6)
WBC # BLD AUTO: 11 10E9/L (ref 4–11)

## 2017-12-09 PROCEDURE — 73130 X-RAY EXAM OF HAND: CPT | Mod: TC,RT

## 2017-12-09 PROCEDURE — 99284 EMERGENCY DEPT VISIT MOD MDM: CPT | Mod: Z6 | Performed by: FAMILY MEDICINE

## 2017-12-09 PROCEDURE — 85025 COMPLETE CBC W/AUTO DIFF WBC: CPT | Performed by: FAMILY MEDICINE

## 2017-12-09 PROCEDURE — 84550 ASSAY OF BLOOD/URIC ACID: CPT | Performed by: FAMILY MEDICINE

## 2017-12-09 PROCEDURE — 80048 BASIC METABOLIC PNL TOTAL CA: CPT | Performed by: FAMILY MEDICINE

## 2017-12-09 PROCEDURE — 99284 EMERGENCY DEPT VISIT MOD MDM: CPT | Performed by: FAMILY MEDICINE

## 2017-12-09 PROCEDURE — 86140 C-REACTIVE PROTEIN: CPT | Performed by: FAMILY MEDICINE

## 2017-12-09 PROCEDURE — 85652 RBC SED RATE AUTOMATED: CPT | Performed by: FAMILY MEDICINE

## 2017-12-09 NOTE — ED AVS SNAPSHOT
[facility]    [address]     [place] [address]    Phone:  [phone]    Fax:  [phone]                                       Kathryn Banks   MRN: [id number]    Department:  [facility]   Date of Visit:  12/9/2017           Patient Information     Date Of Birth          1940        Your diagnoses for this visit were:     Swelling of right hand        You were seen by Luisa Adan MD.      Follow-up Information     Follow up with Dheeraj Jj MD In 3 days.    Specialty:  Internal Medicine    Contact information:    [facility]  [address]   [place] [address] [phone]          Follow up with [facility].    Specialty:  EMERGENCY MEDICINE    Why:  If symptoms worsen    Contact information:    [address]   [place] [address] [phone]    Additional information:    From [address]: Exit at [address] on [place] side of [place]. Turn right on [address]. Turn left at stoplight on [address]. [facility] will be in view two blocks ahead        Discharge Instructions       Thank you for giving us the opportunity to see you. The impression is that you have right hand swelling with possible cellulitis.    Elevate the hand as much as possible.    Take Keflex 250 mg twice a day for 7 days.    Have your hand rechecked in 2-3 days.    After discharge, please closely monitor for any new or worsening symptoms. Return to the Emergency Department at any time if your symptoms worsen.        Future Appointments        Provider Department Dept Phone Center    12/19/2017 4:00 PM Dheeraj Jj MD [facility] [phone] [facility]      24 Hour Appointment Hotline       To make an appointment at any [facility], call [phone] ([phone]). If you don't have a family doctor or clinic, we will help you find one. [facility]  are conveniently located to serve the needs of you and your family.             Review of your medicines      START taking        Dose / Directions Last dose taken    cephalexin 250 MG capsule   Commonly known as:  KEFLEX   Dose:  250 mg   Quantity:  14 capsule        Take 1 capsule (250 mg) by mouth 2 times daily for 7 days   Refills:  0          Our records show that you are taking the medicines listed below. If these are incorrect, please call your family doctor or clinic.        Dose / Directions Last dose taken    ACETAMINOPHEN PO   Dose:  650 mg        Take 650 mg by mouth every 4 hours as needed for pain For pain 1-5 out of 10   Refills:  0        amiodarone 100 MG Tabs tablet   Commonly known as:  PACERONE/CODARONE   Dose:  200 mg   Quantity:  120 tablet        Take 2 tablets (200 mg) by mouth 2 times daily   Refills:  0        calcium acetate 667 MG Caps capsule   Commonly known as:  PHOSLO   Dose:  667 mg   Quantity:  180 capsule        Take 1 capsule (667 mg) by mouth 3 times daily (with meals)   Refills:  0        diltiazem 240 MG 24 hr capsule   Commonly known as:  CARDIZEM CD   Dose:  240 mg   Quantity:  90 capsule        Take 1 capsule (240 mg) by mouth daily   Refills:  0        famotidine 20 MG tablet   Commonly known as:  PEPCID   Dose:  20 mg   Quantity:  60 tablet        Take 1 tablet (20 mg) by mouth 2 times daily as needed   Refills:  1        fluticasone 50 MCG/ACT spray   Commonly known as:  FLONASE   Dose:  1 spray   Quantity:  1 Bottle        Spray 1 spray into both nostrils daily   Refills:  0        HYDRALAZINE HCL PO   Dose:  20 mg        Take 20 mg by mouth every 4 hours as needed for high blood pressure   Refills:  0        hypromellose-dextran Soln ophthalmic solution   Dose:  1 drop        Place 1 drop into both eyes 3 times daily as needed for dry eyes   Refills:  0        ipratropium - albuterol 0.5 mg/2.5 mg/3 mL 0.5-2.5 (3) MG/3ML neb solution   Commonly known as:  DUONEB   Dose:   1 vial        Take 1 vial by nebulization every 6 hours as needed   Refills:  0        LOPRESSOR PO   Dose:  50 mg        Take 50 mg by mouth 2 times daily   Refills:  0        LORazepam 0.5 MG tablet   Commonly known as:  ATIVAN   Dose:  0.25 mg   Quantity:  20 tablet        Take 0.5 tablets (0.25 mg) by mouth every 8 hours as needed for anxiety   Refills:  0        nitroGLYcerin 0.4 MG sublingual tablet   Commonly known as:  NITROSTAT   Dose:  0.4 mg   Quantity:  25 tablet        Place 1 tablet (0.4 mg) under the tongue every 5 minutes as needed for chest pain   Refills:  0        order for DME   Quantity:  1 Device        Equipment being ordered: cpap machine, mask, humidifier, tubing and filters   Refills:  0        pentoxifylline 400 MG CR tablet   Commonly known as:  TRENtal   Dose:  400 mg        Take 400 mg by mouth daily   Refills:  0        pramipexole 0.125 MG tablet   Commonly known as:  MIRAPEX   Dose:  0.125 mg   Quantity:  90 tablet        Take 1 tablet (0.125 mg) by mouth 3 times daily   Refills:  3        pravastatin 40 MG tablet   Commonly known as:  PRAVACHOL   Dose:  40 mg   Quantity:  90 tablet        Take 1 tablet (40 mg) by mouth daily   Refills:  3        * RENAL 1 MG Caps   Dose:  1 capsule        Take 1 capsule by mouth daily   Refills:  0        * NEPHROCAPS 1 MG capsule   Dose:  1 capsule   Quantity:  100 capsule        Take 1 capsule by mouth daily   Refills:  1        VITAMIN D (CHOLECALCIFEROL) PO   Dose:  5000 Units        Take 5,000 Units by mouth daily   Refills:  0        * warfarin 2 MG tablet   Commonly known as:  COUMADIN   Quantity:  30 tablet        1 mg daily for aflut and adjust for desired INR 2.0 to 3.0   Refills:  0        * warfarin 1 MG tablet   Commonly known as:  COUMADIN   Quantity:  60 tablet        Take 2 mg daily or as directed by coumadin clinic   Refills:  0        * Notice:  This list has 4 medication(s) that are the same as other medications prescribed for  you. Read the directions carefully, and ask your doctor or other care provider to review them with you.            Prescriptions were sent or printed at these locations (1 Prescription)                   [facility]   [address] [address]    Telephone:  [phone]   Fax:  [phone]   Hours:                  These medications are not ready yet because we are checking if your insurance will help you pay for them. Call your pharmacy to confirm that your medication is ready for pickup. It may take up to 24 hours for them to receive the prescription. If the prescription is not ready within 3 business days, please contact your clinic or your provider (1 of 1)         cephalexin (KEFLEX) 250 MG capsule                Procedures and tests performed during your visit     Basic metabolic panel    CBC with platelets differential    CRP inflammation    Erythrocyte sedimentation rate auto    Hand XR, G/E 3 views, right    Uric acid      Orders Needing Specimen Collection     None      Pending Results     No orders found from 12/7/2017 to 12/10/2017.            Pending Culture Results     No orders found from 12/7/2017 to 12/10/2017.            Pending Results Instructions     If you had any lab results that were not finalized at the time of your Discharge, you can call the ED Lab Result RN at [phone]. You will be contacted by this team for any positive Lab results or changes in treatment. The nurses are available 7 days a week from 10A to 6:30P.  You can leave a message 24 hours per day and they will return your call.        Thank you for choosing [facility]       Thank you for choosing [facility] for your care. Our goal is always to provide you with excellent care. Hearing back from our patients is one way we can continue to improve our services. Please take a few minutes to complete the written survey that you may receive in the mail after you visit with us. Thank you!    "     MyChart Information     [employer] lets you send messages to your doctor, view your test results, renew your prescriptions, schedule appointments and more. To sign up, go to [email or web address] . Click on \"Log in\" on the left side of the screen, which will take you to the Welcome page. Then click on \"Sign up Now\" on the right side of the page.     You will be asked to enter the access code listed below, as well as some personal information. Please follow the directions to create your username and password.     Your access code is: [email or web address]  Expires: 3/9/2018 10:55 PM     Your access code will  in 90 days. If you need help or a new code, please call your [facility] clinic or [phone].        Care EveryWhere ID     This is your Care EveryWhere ID. This could be used by other organizations to access your [facility] medical records  [id number]        Equal Access to Services     CAROLINE BLAND AH: Hadii pascual rezao Sodory, waaxda luqadaha, qaybta kaalmada adeumu, tay santizo . So [facility] [phone].    ATENCIÓN: Si habla español, tiene a medrano disposición servicios gratuitos de asistencia lingüística. Llame al [phone].    We comply with applicable federal civil rights laws and [place] laws. We do not discriminate on the basis of race, color, national origin, age, disability, sex, sexual orientation, or gender identity.            After Visit Summary       This is your record. Keep this with you and show to your community pharmacist(s) and doctor(s) at your next visit.                  "

## 2017-12-09 NOTE — ED AVS SNAPSHOT
[facility]    [address] [place] [address]    Phone:  [phone]    Fax:  [phone]                                       Kathryn Banks   MRN: [id number]    Department:  [facility]   Date of Visit:  12/9/2017           After Visit Summary Signature Page     I have received my discharge instructions, and my questions have been answered. I have discussed any challenges I see with this plan with the nurse or doctor.    ..........................................................................................................................................  Patient/Patient Representative Signature      ..........................................................................................................................................  Patient Representative Print Name and Relationship to Patient    ..................................................               ................................................  Date                                            Time    ..........................................................................................................................................  Reviewed by Signature/Title    ...................................................              ..............................................  Date                                                            Time

## 2017-12-10 NOTE — DISCHARGE INSTRUCTIONS
Thank you for giving us the opportunity to see you. The impression is that you have right hand swelling with possible cellulitis.    Elevate the hand as much as possible.    Take Keflex 250 mg twice a day for 7 days.    Have your hand rechecked in 2-3 days.    After discharge, please closely monitor for any new or worsening symptoms. Return to the Emergency Department at any time if your symptoms worsen.

## 2017-12-10 NOTE — ED NOTES
Pt With R hand swelling since Thursday. On dyalisis. Last time was Friday and they removed 3 liters.

## 2017-12-10 NOTE — TELEPHONE ENCOUNTER
Reason for Disposition    [1] Numbness (i.e., loss of sensation) of the face, arm / hand, or leg / foot on one side of the body AND [2] gradual onset (e.g., days to weeks) AND [3] present now    Protocols used: NEUROLOGIC DEFICIT-ADULT-AH

## 2017-12-10 NOTE — ED PROVIDER NOTES
[facility] ED Provider Note   CC:     Chief Complaint   Patient presents with     swelling     R hand swelling     HPI:  Kathryn Banks is a 77 year old female who presented to the emergency department with onset of right hand pain and swelling that started Thursday evening.  Patient denies any known trauma, but first noticed some swelling of the right middle finger, progressing into swelling of all the digits as well as the hand.  She has end-stage renal disease and is on dialysis 3 days a week.  She has not had any fever, chills, nausea, increased fatigue or weakness.  She does not recall of having any injuries, and does not think that she was stung by an insect.  Patient has never had this before.  She does not have swelling elsewhere.  Swelling is limited to the right hand and fingers, and does not involve the proximal arm.  Her current pain level is 2/10 at rest, but if she tries to use the right hand, the pain will go up to 7/10.  Patient is a history of diabetes, paroxysmal A. fib, on chronic anticoagulation, anemia, with a history of lymphedema.  She has swelling in both ankles which is chronic but does not involve the feet.    Problem List:  Patient Active Problem List    Diagnosis     ESRD (end stage renal disease) on dialysis (H)     Physical deconditioning     Bilateral leg edema     DM type 2 -- Hgb A1C 6.6 on 10/13/17     SBO -- S/P Lysis of Adhes 10/24/17     Paroxysmal atrial fibrillation (H)     Anxiety     Anemia in chronic kidney disease     Microscopic hematuria     Acute on chronic renal failure -- Dialysis started 10/2017     Memory loss     Proteinuria 2.1 g/g Cr     Chronic heart failure (H) with preserved EF     Atrial flutter (H) - present 6/12     Pulmonary hypertension     Postprocedural hypotension     Fall     Type 2 diabetes mellitus with complication (H)     Fall off motorized mobility scooter, initial encounter      Acute decompensated heart failure (H)     Essential hypertension with goal blood pressure less than 140/90     Osteoarthritis of hip     Lymphedema     Health Care Home     CAD - NSTEMI, CABG x 5 in 2006, angio 2013 patent grafts except occluded graft to PL     Hyperlipidemia LDL goal <100     Kidney stone     Morbid obesity (H)     Esophageal reflux     Lipoma of other skin and subcutaneous tissue     ANABEL on CPAP     Advanced directives, counseling/discussion     History of peptic ulcer disease     Restless leg syndrome       MEDS:   Discharge Medication List as of 12/9/2017 10:55 PM      CONTINUE these medications which have NOT CHANGED    Details   diltiazem (CARDIZEM CD) 240 MG 24 hr capsule Take 1 capsule (240 mg) by mouth daily, Disp-90 capsule, R-0, E-Prescribe      !! NEPHROCAPS 1 MG capsule Take 1 capsule by mouth daily, Disp-100 capsule, R-1, E-Prescribe      amiodarone (PACERONE/CODARONE) 100 MG TABS tablet Take 2 tablets (200 mg) by mouth 2 times daily, Disp-120 tablet, R-0, E-Prescribe      !! warfarin (COUMADIN) 1 MG tablet Take 2 mg daily or as directed by coumadin clinic, Disp-60 tablet, R-0, E-Prescribe      Metoprolol Tartrate (LOPRESSOR PO) Take 50 mg by mouth 2 times daily, Historical      HYDRALAZINE HCL PO Take 20 mg by mouth every 4 hours as needed for high blood pressure, Historical      VITAMIN D, CHOLECALCIFEROL, PO Take 5,000 Units by mouth daily, Historical      !! B Complex-C-Folic Acid (RENAL) 1 MG CAPS Take 1 capsule by mouth daily, Historical      pentoxifylline (TRENTAL) 400 MG CR tablet Take 400 mg by mouth daily, Historical      !! warfarin (COUMADIN) 2 MG tablet 1 mg daily for aflut and adjust for desired INR 2.0 to 3.0, Disp-30 tablet, No Print Out      LORazepam (ATIVAN) 0.5 MG tablet Take 0.5 tablets (0.25 mg) by mouth every 8 hours as needed for anxiety, Disp-20 tablet, R-0, Local Print      hypromellose-dextran (ARTIFICAL TEARS) SOLN ophthalmic solution Place 1 drop into both  eyes 3 times daily as needed for dry eyes, No Print Out      famotidine (PEPCID) 20 MG tablet Take 1 tablet (20 mg) by mouth 2 times daily as needed, Disp-60 tablet, R-1, No Print OutFor heartburn      pramipexole (MIRAPEX) 0.125 MG tablet Take 1 tablet (0.125 mg) by mouth 3 times daily, Disp-90 tablet, R-3, E-Prescribe      fluticasone (FLONASE) 50 MCG/ACT spray Spray 1 spray into both nostrils daily, Disp-1 Bottle, R-0, Transitional      ipratropium - albuterol 0.5 mg/2.5 mg/3 mL (DUONEB) 0.5-2.5 (3) MG/3ML neb solution Take 1 vial by nebulization every 6 hours as needed , Historical      ACETAMINOPHEN PO Take 650 mg by mouth every 4 hours as needed for pain For pain 1-5 out of 10, Historical      nitroGLYcerin (NITROSTAT) 0.4 MG sublingual tablet Place 1 tablet (0.4 mg) under the tongue every 5 minutes as needed for chest pain, Disp-25 tablet, R-0, Transitional      pravastatin (PRAVACHOL) 40 MG tablet Take 1 tablet (40 mg) by mouth daily, Disp-90 tablet, R-3, Transitional      calcium acetate (PHOSLO) 667 MG CAPS capsule Take 1 capsule (667 mg) by mouth 3 times daily (with meals), Disp-180 capsule, Transitional      ORDER FOR DME Equipment being ordered: cpap machine, mask, humidifier, tubing and filtersDisp-1 Device, R-0, Normal       !! - Potential duplicate medications found. Please discuss with provider.          ALLERGIES:    Allergies   Allergen Reactions     Ciprofloxacin Nausea and Vomiting     Zofran did not help     Oxycodone Visual Disturbance     Delusions, blackouts      Lisinopril Cough     Sulfa Drugs GI Disturbance     LOSS OF TASTE     Penicillins Other (See Comments)     Swelling and reddness at injection site, pt wanted it on        Past Surgical History:   Procedure Laterality Date     ANGIOPLASTY       C APPENDECTOMY       C CABG, VEIN, FIVE  12/06    SVG x 4 and LIMA to LAD     C SOTO W/O FACETEC FORAMOT/DSKC 1/2 VRT SEG, LUMBAR  1968     C REMV CATARACT INTRACAP,INSERT LENS      Bilateral      C TOTAL ABDOM HYSTERECTOMY  1995     - fibroids     C VAGOTOMY/PYLOROPLASTY,SELECT  1970     COLONOSCOPY  12/3/2007     COLONOSCOPY  1/17/2014    Procedure: COLONOSCOPY;  Colonoscopy;  Surgeon: Zack Stearns MD;  Location:  GI     CYSTOSCOPY  11/25/09    [facility]     ENDOSCOPY  03/21/2000    Upper GI     HC COLONOSCOPY THRU STOMA, DIAGNOSTIC  10/7/04    poor prep, repeat in 2-3 years     HC COLONOSCOPY W/WO BRUSH/WASH  10/31/07    Repeat-poor quality prep     HC REMOVAL OF OVARY/TUBE(S)      Salpingo-Oophorectomy, Unilateral     HC REVISE MEDIAN N/CARPAL TUNNEL SURG      Carpal tunnel release     HC UGI ENDOSCOPY DIAG W BIOPSY  10/31/07     HC UGI ENDOSCOPY, SIMPLE EXAM  12/3/2007     LAPAROTOMY, LYSIS ADHESIONS, COMBINED N/A 10/24/2017    Procedure: COMBINED LAPAROTOMY, LYSIS ADHESIONS;  REPAIR FOCAL ILEAL SMALL BOWEL PERFERATION, LYSIS OF ADHESIONS.;  Surgeon: Rashard Zafar MD;  Location:  OR     OPEN REDUCTION INTERNAL FIXATION HIP NAILING Left 7/3/2016    Procedure: OPEN REDUCTION INTERNAL FIXATION HIP NAILING;  Surgeon: Lake Schulz MD;  Location:  OR       Social History   Substance Use Topics     Smoking status: Former Smoker     Years: 10.00     Quit date: 1/1/1969     Smokeless tobacco: Never Used     Alcohol use 0.0 oz/week     0 Standard drinks or equivalent per week      Comment: 1/2 a beer once a month         Review of Systems   Except as noted in HPI, all other systems were reviewed and are negative    Physical Exam     Vitals were reviewed  Patient Vitals for the past 8 hrs:   BP Heart Rate Resp SpO2   12/09/17 2259 148/79 - - -   12/09/17 2253 - 71 20 92 %   12/09/17 2250 - 69 20 91 %     GENERAL APPEARANCE: Alert, no apparent distress  FACE: normal facies  EYES: Pupils are equal  HENT: normal external exam  NECK: no adenopathy or asymmetry  RESP: normal respiratory effort; clear breath sounds bilaterally  CV: regular rate and rhythm; no significant murmurs, gallops  or rubs  ABD: soft, no tenderness; no rebound or guarding; bowel sounds are normal  EXT: Right hand with diffuse swelling both on the dorsal and palmar side with swelling extending into all the digits.  There is tenderness on palpation.  There is good peripheral pulses.  There is slight increased warmth compared to the left side.  There is good radial and ulnar pulses.  No proximal arm swelling.  SKIN: no associated rash of the hand, diffuse swelling of the entire right hand  NEURO: no facial droop; no focal deficits, speech is normal        Available Lab/Imaging Results     Results for orders placed or performed during the hospital encounter of 12/09/17 (from the past 24 hour(s))   CBC with platelets differential   Result Value Ref Range    WBC 11.0 4.0 - 11.0 10e9/L    RBC Count 3.14 (L) 3.8 - 5.2 10e12/L    Hemoglobin 9.0 (L) 11.7 - 15.7 g/dL    Hematocrit 30.8 (L) 35.0 - 47.0 %    MCV 98 78 - 100 fl    MCH 28.7 26.5 - 33.0 pg    MCHC 29.2 (L) 31.5 - 36.5 g/dL    RDW 21.9 (H) 10.0 - 15.0 %    Platelet Count 255 150 - 450 10e9/L    Diff Method Automated Method     % Neutrophils 76.7 %    % Lymphocytes 13.7 %    % Monocytes 7.0 %    % Eosinophils 1.6 %    % Basophils 0.5 %    % Immature Granulocytes 0.5 %    Absolute Neutrophil 8.4 (H) 1.6 - 8.3 10e9/L    Absolute Lymphocytes 1.5 0.8 - 5.3 10e9/L    Absolute Monocytes 0.8 0.0 - 1.3 10e9/L    Absolute Eosinophils 0.2 0.0 - 0.7 10e9/L    Absolute Basophils 0.1 0.0 - 0.2 10e9/L    Abs Immature Granulocytes 0.1 0 - 0.4 10e9/L   Erythrocyte sedimentation rate auto   Result Value Ref Range    Sed Rate 41 (H) 0 - 30 mm/h   CRP inflammation   Result Value Ref Range    CRP Inflammation 8.1 (H) 0.0 - 8.0 mg/L   Uric acid   Result Value Ref Range    Uric Acid 4.4 2.6 - 6.0 mg/dL   Basic metabolic panel   Result Value Ref Range    Sodium 145 (H) 133 - 144 mmol/L    Potassium 4.0 3.4 - 5.3 mmol/L    Chloride 113 (H) 94 - 109 mmol/L    Carbon Dioxide 25 20 - 32 mmol/L    Anion  Gap 7 3 - 14 mmol/L    Glucose 132 (H) 70 - 99 mg/dL    Urea Nitrogen 25 7 - 30 mg/dL    Creatinine 2.56 (H) 0.52 - 1.04 mg/dL    GFR Estimate 18 (L) >60 mL/min/1.7m2    GFR Estimate If Black 22 (L) >60 mL/min/1.7m2    Calcium 7.1 (L) 8.5 - 10.1 mg/dL   Hand XR, G/E 3 views, right    Narrative    HAND THREE  VIEWS RIGHT 12/9/2017 9:59 PM     HISTORY: Swelling.    COMPARISON: None.    FINDINGS: Severe erosive changes at the proximal interphalangeal joint  of the fourth digit and distal interphalangeal joint of the fifth  digit. Moderate to severe first carpometacarpal degenerative change.  Less severe degenerative changes throughout. There is no acute  fracture or dislocation. There are no worrisome bony lesions.      Impression    IMPRESSION: Erosive changes around the proximal interphalangeal joint  of the fourth digit, differential includes erosive osteoarthritis and  septic joint/osteomyelitis.     BARRY TAO MD     *Note: Due to a large number of results and/or encounters for the requested time period, some results have not been displayed. A complete set of results can be found in Results Review.         Impression     Final diagnoses:   Swelling of right hand       ED Course & Medical Decision Making   Kathryn Banks is a 77 year old female who presented to the emergency department with swelling of the right hand that started Thursday evening, beginning with the middle or ring finger spreading proximally into the entire hand.  Patient denied any trauma.  History and exam are noted above.  Laboratory workup reveals an normal white blood count of 11.0 with 76% neutrophils.  Basic metabolic panel reveals a sodium of 145, potassium of 4.0, creatinine of 2.56, and calcium of 7.1.  Patient is due for dialysis on Monday.  Sed rate and CRP are essentially normal.  X-rays of the right hand reveals erosive changes around the proximal interphalangeal joint of the fourth digit.  Patient has some degenerative changes,  and her right hand swelling may be due to acute flareup of the arthritis, but I am also concerned about the possibility of cellulitis.  I am going to have the patient begin Keflex 250 mg twice a day for 7 days, elevate the right hand and arm as much as possible, and follow-up in the next 3-4 days for recheck; sooner if worsening.      Written after-visit summary and instructions were given at the time of discharge.      Discharge Medication List as of 12/9/2017 10:55 PM      START taking these medications    Details   cephalexin (KEFLEX) 250 MG capsule Take 1 capsule (250 mg) by mouth 2 times daily for 7 days, Disp-14 capsule, R-0, E-Prescribe               This note was completed in part using Dragon voice recognition, and may contain word and grammatical errors.        Luisa Adan MD  12/10/17 0506

## 2017-12-11 ENCOUNTER — TELEPHONE (OUTPATIENT)
Dept: INTERNAL MEDICINE | Facility: CLINIC | Age: 77
End: 2017-12-11

## 2017-12-11 ENCOUNTER — TELEPHONE (OUTPATIENT)
Dept: CARDIOLOGY | Facility: CLINIC | Age: 77
End: 2017-12-11

## 2017-12-11 ENCOUNTER — CARE COORDINATION (OUTPATIENT)
Dept: CARE COORDINATION | Facility: CLINIC | Age: 77
End: 2017-12-11

## 2017-12-11 ENCOUNTER — ANTICOAGULATION THERAPY VISIT (OUTPATIENT)
Dept: ANTICOAGULATION | Facility: CLINIC | Age: 77
End: 2017-12-11
Payer: COMMERCIAL

## 2017-12-11 ENCOUNTER — DOCUMENTATION ONLY (OUTPATIENT)
Dept: CARE COORDINATION | Facility: CLINIC | Age: 77
End: 2017-12-11

## 2017-12-11 LAB — INR PPP: 1.6

## 2017-12-11 PROCEDURE — 99207 ZZC NO CHARGE NURSE ONLY: CPT | Performed by: INTERNAL MEDICINE

## 2017-12-11 NOTE — TELEPHONE ENCOUNTER
"Writer received refill request for metoprolol from patient's pharmacy. There are three different instructions noted in patient's chart. Writer contacted Anisa patient's home care nurse who has been setting up patients medications to verify what dose of metorplol patient has been taking. Per Anisa patient has been taking Metoprolol 50mg BID. Per Dr. King's LOV note on 8/10/17 \"I have reduced the dose of metoprolol from 50 mg to 25 mg p.o. b.i.d. because of her complaint of fatigue and mild sinus bradycardia.\" Per Anisa patient has not c/o fatigue and HR's have been 60-62. Message routed to Dr. King if patient should continue on Metoprolol 50mg BID or if she should decrease to 25mg BID.   "

## 2017-12-11 NOTE — PROGRESS NOTES
Yes she needs the Diltiazem for her bp,  Yes needs the antibiotics for the cellultis  Yes needs dialysis to remove the weight and fluid.

## 2017-12-11 NOTE — MR AVS SNAPSHOT
Kathryn MICKY Banks   12/11/2017   Anticoagulation Therapy Visit    Description:  77 year old female   Provider:  Dheeraj Jj MD   Department:  Ph Anticoag           INR as of 12/11/2017     Today's INR 1.6!      Anticoagulation Summary as of 12/11/2017     INR goal 2.0-3.0   Today's INR 1.6!   Full instructions 12/11: 3 mg; 12/12: 2 mg; 12/13: 2 mg; 12/14: 2 mg; Otherwise No maintenance plan   Next INR check 12/15/2017    Indications   Atrial fibrillation (H) (Resolved) [I48.91]  Long-term (current) use of anticoagulants [Z79.01] (Resolved) [Z79.01]         Contact Numbers     Clinic Number:         December 2017 Details    Sun Mon Tue Wed Thu Fri Sat          1               2                 3               4               5               6               7               8               9                 10               11      3 mg   See details      12      2 mg         13      2 mg         14      2 mg         15            16                 17               18               19               20               21               22               23                 24               25               26               27               28               29               30                 31                      Date Details   12/11 This INR check       Date of next INR:  12/15/2017         How to take your warfarin dose     To take:  2 mg Take 2 of the 1 mg tablets.    To take:  3 mg Take 3 of the 1 mg tablets.

## 2017-12-11 NOTE — TELEPHONE ENCOUNTER
Reason for Call:  INR    Who is calling?  Home Care: [facility]    Phone number:  [phone]    Fax number:  n/a    Name of caller: Anisa    INR Value:  1.6    Are there any other concerns:  Yes: Hand is really swollen. She went to the E.R. and was given an antibiotic that she hasn't started yet. Anisa is waiting in home for a call back for medication set up.     Can we leave a detailed message on this number? YES    Phone number patient can be reached at: Other phone number:  [phone]      Call taken on 12/11/2017 at 8:29 AM by Judson Erazo

## 2017-12-11 NOTE — PROGRESS NOTES
Clinic Care Coordination Contact  [facility]/Voicemail    Referral Source: CTS  Clinical Data: Care Coordinator Outreach  Outreach attempted x 1.  Left message on voicemail with call back information and requested return call.  Plan: Care Coordinator will try to reach patient again in 1-2 business days.    Meenakshi REYNAGA, RN, PHN  Care Coordination    [facility]  [address] [place] [address]  Office: [phone]  Fax [phone]   [facility]  [address] [place] [address]  Office: [phone] Fax [phone]  [email or web address]   [email or web address]   Connect with [employer] [facility] on social media.

## 2017-12-11 NOTE — PROGRESS NOTES
ANTICOAGULATION FOLLOW-UP CLINIC VISIT    Patient Name:  Kathryn Banks  Date:  12/11/2017  Contact Type:  Telephone/ Anisa  nurse    SUBJECTIVE:     Patient Findings     Positives Change in medications (starting Keflex today x7 (12/11-12/18). Will also be starting diltiazem, no affect on INR per [employer])    Comments Reason for Call:  INR     Who is calling?  Home Care: [facility]     Phone number:  [phone]     Fax number:  n/a     Name of caller: Anisa     INR Value:  1.6     Are there any other concerns:  Yes: Hand is really swollen. She went to the E.R. and was given an antibiotic that she hasn't started yet. Anisa is waiting in home for a call back for medication set up.      Can we leave a detailed message on this number? YES     Phone number patient can be reached at: Other phone number:  [phone]        Call taken on 12/11/2017 at 8:29 AM by Judson Erazo           OBJECTIVE    INR   Date Value Ref Range Status   12/11/2017 1.6  Final       ASSESSMENT / PLAN  INR assessment SUB    Recheck INR In: 4 DAYS    INR Location Homecare INR      Anticoagulation Summary as of 12/11/2017     INR goal 2.0-3.0   Today's INR 1.6!   Maintenance plan No maintenance plan   Full instructions 12/11: 3 mg; 12/12: 2 mg; 12/13: 2 mg; 12/14: 2 mg; Otherwise No maintenance plan   Next INR check 12/15/2017   Target end date     Indications   Atrial fibrillation (H) (Resolved) [I48.91]  Long-term (current) use of anticoagulants [Z79.01] (Resolved) [Z79.01]         Anticoagulation Episode Summary     INR check location     Preferred lab     Send INR reminders to MIHM POOL    Comments 1 mg, 2.5 mg, and 3 mg tabs (as of 11/27, per HC nurse)        Anticoagulation Care Providers     Provider Role Specialty Phone number    Dheeraj Jj MD [facility] Internal Medicine [phone]            See the Encounter Report to view Anticoagulation Flowsheet and Dosing Calendar (Go to Encounters tab in chart  review, and find the Anticoagulation Therapy Visit)    Dosage adjustment made based on physician directed care plan.      Zo Davis RN

## 2017-12-11 NOTE — PROGRESS NOTES
[facility] now requests orders and shares plan of care/discharge summaries for some patients through dakick.  Please REPLY TO THIS MESSAGE in order to give authorization for orders when needed.  This is considered a verbal order, you will still receive a faxed copy of orders for signature.  Thank you for your assistance in improving collaboration for our patients.    Pt's wt is up 3 lbs since yesterday from 155 to 158.  BP is 204/94 at visit this am. Has not started her cardizem as I/s, will add to med set up today. Denies any increased SOB, chest pains or heart palpitations.  Pt continues to have swelling in R hand that has increased since last week, 2 plus pitting edema noted.  Continues to have pain rated 3-8/10.  Pt was seen in ER for this on Saturday and was prescribed antibiotics, but has not started them yet, will add to med set up.  Pt reports MD will be assessing her at dialysis today as well.      Please advise if you have any other orders or changes for pt.    Thanks,   Anisa Licea, ROSARIO    [email or web address]

## 2017-12-12 NOTE — PROGRESS NOTES
Clinic Care Coordination Contact  OUTREACH    Referral Information:  Referral Source: CTS  Reason for Contact: ED follow up        Universal Utilization:   ED Visits in last year: 2  Hospital visits in last year: 4  Last PCP appointment: 05/24/17  Missed Appointments: 21  Concerns: forgetting to take medications       Clinical Concerns:  Current Medical Concerns: called pt, she states the swelling in her arm has resolved. She continues to have home care coming out to help her.  She also has a pre op on 12/19 with Dr. Jj for CREATE GRAFT ARTERIOVENOUS UPPER EXTREMITY.  Pt does not have any questions or concerns at this time.   Current Behavioral Concerns: no current concerns    Education Provided to patient: na   Clinical Pathway Name: None    Medication Management:  Home care RN     Functional Status:  Mobility Status: Independent w/Device     Transportation:      Psychosocial:  Current living arrangement:: I live in a private home with family, I live in a private home with spouse  Financial/Insurance: not discussed     Resources and Interventions:  Current Resources: Home Care; Home Care  PAS Number: [id number]  Senior Linkage Line Referral Placed: 07/02/16  Advanced Care Plans/Directives on file:: Yes        Goals:  Per Home Care  Patient/Caregiver understanding: pt has a good understanding of follow up and who to contact with questions     Upcoming appointment: 12/19/17     Plan:   Pt will continue to be followed by home care  RN will continue to monitor for Home Care Discharge every 2-4 weeks.      Meenakshi REYNAGA, RN, PHN  Care Coordination    [id number] [place] [address]  Office: [phone]  Fax [phone]   [facility]  [address] [place] [address]  Office: [phone] Fax [phone]  [email or web address]   [email or web address]   Connect with [facility] on social media.

## 2017-12-15 ENCOUNTER — TELEPHONE (OUTPATIENT)
Dept: INTERNAL MEDICINE | Facility: CLINIC | Age: 77
End: 2017-12-15

## 2017-12-15 NOTE — TELEPHONE ENCOUNTER
Reason for Call:  INR    Who is calling?  Home Care: Anisa    Phone number:  [phone]    Fax number:     Name of [facility] states patient may not be home to get INR checked today & Anisa wanted to talk to someone regarding this, please advise    INR Value:      Are there any other concerns:  No    Can we leave a detailed message on this number? YES    Phone number patient can be reached at: Other phone number:  [phone]      Call taken on 12/15/2017 at 8:35 AM by Josephine Tejeda

## 2017-12-15 NOTE — TELEPHONE ENCOUNTER
I spoke with Anisa and she states that she is going to try and go out and see Kathryn this afternoon around 3 but if she can't then what will we do? I told her to call us either way this afternoon and we will set up a plan. We may need to dose her out through the weekend and have an INR checked on Monday. Anisa verbalized understanding and agrees with plan of care. She had no further questions or concerns at this time. Gianni Palumbo, RN, BSN

## 2017-12-15 NOTE — TELEPHONE ENCOUNTER
See note below  I have left a VM for Anisa advising 2 mg daily and recheck on Monday 12/18. Flow sheet updated        Home care nurse calls to let [facility] know pt has not been home all day.  HC is unable to get INR today.  Please call to discuss dosing for the weekend.      Zo Davis RN

## 2017-12-16 ENCOUNTER — DOCUMENTATION ONLY (OUTPATIENT)
Dept: CARE COORDINATION | Facility: CLINIC | Age: 77
End: 2017-12-16

## 2017-12-16 NOTE — PROGRESS NOTES
[facility] now requests orders and shares plan of care/discharge summaries for some patients through [employer].  Please REPLY TO THIS MESSAGE in order to give authorization for orders when needed.  This is considered a verbal order, you will still receive a faxed copy of orders for signature.  Thank you for your assistance in improving collaboration for our patients.      MATTHEW   Patient's blood pressure today was 172/80 before blood pressure medication.  Told her to take blood pressure medication and will continue to monitor.  This writer will not be following patient after today, and will not check EPIC. If concerns need to be addressed please call Noelle PONCE/[facility] [phone].    Noelle Thayer RN  [phone]  [email or web address]

## 2017-12-18 ENCOUNTER — ANTICOAGULATION THERAPY VISIT (OUTPATIENT)
Dept: ANTICOAGULATION | Facility: CLINIC | Age: 77
End: 2017-12-18
Payer: COMMERCIAL

## 2017-12-18 ENCOUNTER — TELEPHONE (OUTPATIENT)
Dept: FAMILY MEDICINE | Facility: CLINIC | Age: 77
End: 2017-12-18
Payer: COMMERCIAL

## 2017-12-18 ENCOUNTER — TELEPHONE (OUTPATIENT)
Dept: INTERNAL MEDICINE | Facility: CLINIC | Age: 77
End: 2017-12-18

## 2017-12-18 ENCOUNTER — DOCUMENTATION ONLY (OUTPATIENT)
Dept: CARE COORDINATION | Facility: CLINIC | Age: 77
End: 2017-12-18

## 2017-12-18 DIAGNOSIS — Z99.2 ESRD (END STAGE RENAL DISEASE) ON DIALYSIS (H): Primary | ICD-10-CM

## 2017-12-18 DIAGNOSIS — I10 BENIGN ESSENTIAL HYPERTENSION: Primary | ICD-10-CM

## 2017-12-18 DIAGNOSIS — N18.6 ESRD (END STAGE RENAL DISEASE) ON DIALYSIS (H): Primary | ICD-10-CM

## 2017-12-18 LAB — INR PPP: 1.8

## 2017-12-18 RX ORDER — METOPROLOL TARTRATE 50 MG
50 TABLET ORAL 2 TIMES DAILY
Qty: 60 TABLET | Refills: 1 | Status: SHIPPED | OUTPATIENT
Start: 2017-12-18 | End: 2018-04-11

## 2017-12-18 NOTE — PROGRESS NOTES
[facility] now requests orders and shares plan of care/discharge summaries for some patients through [employer]  Please REPLY TO THIS MESSAGE in order to give authorization for orders when needed.  This is considered a verbal order, you will still receive a faxed copy of orders for signature.  Thank you for your assistance in improving collaboration for our patients.    Pt seen by SN for visit today.    Pts BP is 194/88 this am.  Pt is out of metoprolol, pharmacy states they are waiting for MD approval for this med.  Pt is also out of trental, again pharmacy is waiting for MD approval.     Right hand is edematous, 2+ pitting, reddened  and painful, pt states it went back to normal and just started again today.     Pt was I/s to see Dr. Parviz mendez as she missed another appointment.  Pt states she is going to go to dialysis today and either go to ER or try and get in to see Dr. Jj today for her hand.      Please let me know if you have any other recommendations.    Thanks,    Anisa Licea RN    [email or web address]

## 2017-12-18 NOTE — TELEPHONE ENCOUNTER
Reason for Call:  INR    Who is calling?  Home Care    Phone number:  [phone]    Fax number:      Name of caller: Anisa - In the home waiting to set up meds    INR Value:  1.8    Are there any other concerns:  No    Can we leave a detailed message on this number? YES    Phone number patient can be reached at: Home number on file [phone] (home)      Call taken on 12/18/2017 at 8:26 AM by Sienna Lafleur

## 2017-12-18 NOTE — TELEPHONE ENCOUNTER
Forms received from  Home Care on 12/18/17.  Forms with RN to review medications.  Orders pended for provider to sign.    Forms given to Christina PCP for signature on

## 2017-12-18 NOTE — MR AVS SNAPSHOT
Kathryn MICKY Banks   12/18/2017   Anticoagulation Therapy Visit    Description:  77 year old female   Provider:  Dheeraj Jj MD   Department:  Ph Anticoag           INR as of 12/18/2017     Today's INR 1.8!      Anticoagulation Summary as of 12/18/2017     INR goal 2.0-3.0   Today's INR 1.8!   Full instructions 12/18: 3 mg; 12/19: 2 mg; 12/20: 2 mg; Otherwise No maintenance plan   Next INR check 12/21/2017    Indications   Atrial fibrillation (H) (Resolved) [I48.91]  Long-term (current) use of anticoagulants [Z79.01] (Resolved) [Z79.01]         Contact Numbers     Clinic Number:         December 2017 Details    Sun Mon Tue Wed Thu Fri Sat          1               2                 3               4               5               6               7               8               9                 10               11               12               13               14               15               16                 17               18      3 mg   See details      19      2 mg         20      2 mg         21            22               23                 24               25               26               27               28               29               30                 31                      Date Details   12/18 This INR check       Date of next INR:  12/21/2017         How to take your warfarin dose     To take:  2 mg Take 2 of the 1 mg tablets.    To take:  3 mg Take 3 of the 1 mg tablets.

## 2017-12-18 NOTE — PROGRESS NOTES
ANTICOAGULATION FOLLOW-UP CLINIC VISIT    Patient Name:  Kathryn Banks  Date:  12/18/2017  Contact Type:  Telephone/ Riya  nurse    SUBJECTIVE:     Patient Findings     Comments Reason for Call:  INR     Who is calling?  Home Care     Phone number:  [phone]     Fax number:       Name of caller: Anisa Munguia In the home waiting to set up meds     INR Value:  1.8     Are there any other concerns:  No     Can we leave a detailed message on this number? YES     Phone number patient can be reached at: Home number on file [phone] (home)        Call taken on 12/18/2017 at 8:26 AM by Sienna Lafleur           OBJECTIVE    INR   Date Value Ref Range Status   12/18/2017 1.8  Final       ASSESSMENT / PLAN  INR assessment SUB    Recheck INR In: 3 DAYS    INR Location [facility] INR      Anticoagulation Summary as of 12/18/2017     INR goal 2.0-3.0   Today's INR 1.8!   Maintenance plan No maintenance plan   Full instructions 12/18: 3 mg; 12/19: 2 mg; 12/20: 2 mg; Otherwise No maintenance plan   Next INR check 12/21/2017   Target end date     Indications   Atrial fibrillation (H) (Resolved) [I48.91]  Long-term (current) use of anticoagulants [Z79.01] (Resolved) [Z79.01]         Anticoagulation Episode Summary     INR check location     Preferred lab     Send INR reminders to [facility] POOL    Comments 1 mg, 2.5 mg, and 3 mg tabs (as of 11/27, per HC nurse)        Anticoagulation Care Providers     Provider Role Specialty Phone number    Dheeraj Jj MD [facility] [phone]            See the Encounter Report to view Anticoagulation Flowsheet and Dosing Calendar (Go to Encounters tab in chart review, and find the Anticoagulation Therapy Visit)    Dosage adjustment made based on physician directed care plan.      Zo Davis RN

## 2017-12-19 ENCOUNTER — OFFICE VISIT (OUTPATIENT)
Dept: INTERNAL MEDICINE | Facility: CLINIC | Age: 77
End: 2017-12-19
Payer: COMMERCIAL

## 2017-12-19 VITALS
OXYGEN SATURATION: 96 % | RESPIRATION RATE: 16 BRPM | DIASTOLIC BLOOD PRESSURE: 72 MMHG | WEIGHT: 143.1 LBS | HEART RATE: 74 BPM | TEMPERATURE: 96.2 F | BODY MASS INDEX: 24.56 KG/M2 | SYSTOLIC BLOOD PRESSURE: 142 MMHG

## 2017-12-19 DIAGNOSIS — N18.6 ANEMIA IN CHRONIC KIDNEY DISEASE, ON CHRONIC DIALYSIS (H): ICD-10-CM

## 2017-12-19 DIAGNOSIS — Z99.2 ESRD (END STAGE RENAL DISEASE) ON DIALYSIS (H): ICD-10-CM

## 2017-12-19 DIAGNOSIS — E11.8 TYPE 2 DIABETES MELLITUS WITH COMPLICATION, WITHOUT LONG-TERM CURRENT USE OF INSULIN (H): ICD-10-CM

## 2017-12-19 DIAGNOSIS — G47.33 OSA ON CPAP: ICD-10-CM

## 2017-12-19 DIAGNOSIS — N18.6 ESRD (END STAGE RENAL DISEASE) ON DIALYSIS (H): ICD-10-CM

## 2017-12-19 DIAGNOSIS — Z99.2 ANEMIA IN CHRONIC KIDNEY DISEASE, ON CHRONIC DIALYSIS (H): ICD-10-CM

## 2017-12-19 DIAGNOSIS — Z01.818 PREOP GENERAL PHYSICAL EXAM: Primary | ICD-10-CM

## 2017-12-19 DIAGNOSIS — D63.1 ANEMIA IN CHRONIC KIDNEY DISEASE, ON CHRONIC DIALYSIS (H): ICD-10-CM

## 2017-12-19 PROCEDURE — 99215 OFFICE O/P EST HI 40 MIN: CPT | Performed by: INTERNAL MEDICINE

## 2017-12-19 ASSESSMENT — PAIN SCALES - GENERAL: PAINLEVEL: NO PAIN (0)

## 2017-12-19 NOTE — PROGRESS NOTES
[address] [address]  [phone]  Dept: [phone]    PRE-OP EVALUATION:  Today's date: 2017    Kathryn Banks (: 1940) presents for pre-operative evaluation assessment as requested by Dr. Cardenas .  She requires evaluation and anesthesia risk assessment prior to undergoing surgery/procedure for treatment of Left arm arteriovenous graft creation .  Proposed procedure: Create graft arteriovenous upper ext    Date of Surgery/ Procedure: 18  Time of Surgery/ Procedure: [facility]  Hospital/Surgical Facility: [facility]    Primary Physician: Dheeraj Jj  Type of Anesthesia Anticipated: General    Patient has a Health Care Directive or Living Will:  YES on file    Preop Questions 2017   1.  Do you have a history of heart attack, stroke, stent, bypass or surgery on an artery in the head, neck, heart or legs? YES -bypass surgery 10 years.    2.  Do you ever have any pain or discomfort in your chest? No   3.  Do you have a history of  Heart Failure? YES - congestive hear failure   4.   Are you troubled by shortness of breath when:  walking on a level surface, or up a slight hill, or at night? No   5.  Do you currently have a cold, bronchitis or other respiratory infection? YES - cough    6.  Do you have a cough, shortness of breath, or wheezing? YES - cough   7.  Do you sometimes get pains in the calves of your legs when you walk? No   8. Do you or anyone in your family have previous history of blood clots? No   9.  Do you or does anyone in your family have a serious bleeding problem such as prolonged bleeding following surgeries or cuts? No   10. Have you ever had problems with anemia or been told to take iron pills? YES - taken in past   11. Have you had any abnormal blood loss such as black, tarry or bloody stools, or abnormal vaginal bleeding? No   12. Have you ever had a blood transfusion? YES - within last year   13. Have you or any of your  relatives ever had problems with anesthesia? No   14. Do you have sleep apnea, excessive snoring or daytime drowsiness? YES - c pap   15. Do you have any prosthetic heart valves? No   16. Do you have prosthetic joints? No   17. Is there any chance that you may be pregnant? No           HPI:                                                      Brief HPI related to upcoming procedure: Needs left arm AV fistula for dialysis      See problem list for active medical problems.  Problems all longstanding and stable, except as noted/documented.  See ROS for pertinent symptoms related to these conditions.                                                                                                  .    MEDICAL HISTORY:                                                    Patient Active Problem List    Diagnosis Date Noted     ESRD (end stage renal disease) on dialysis (H) 11/06/2017     Priority: Medium     Physical deconditioning 11/06/2017     Priority: Medium     Bilateral leg edema 11/06/2017     Priority: Medium     DM type 2 -- Hgb A1C 6.6 on 10/13/17 10/29/2017     Priority: Medium     SBO -- S/P Lysis of Adhes 10/24/17 10/22/2017     Priority: Medium     Paroxysmal atrial fibrillation (H) 10/20/2017     Priority: Medium     Anxiety 10/20/2017     Priority: Medium     Anemia in chronic kidney disease 09/20/2017     Priority: Medium     Microscopic hematuria 06/21/2017     Priority: Medium     Acute on chronic renal failure -- Dialysis started 10/2017 06/12/2017     Priority: Medium     Memory loss 06/12/2017     Priority: Medium     Proteinuria 2.1 g/g Cr 06/02/2017     Priority: Medium     Chronic heart failure (H) with preserved EF 05/31/2017     Priority: Medium     Atrial flutter (H) - present 6/12 05/31/2017     Priority: Medium     Pulmonary hypertension 05/24/2017     Priority: Medium     Postprocedural hypotension 03/09/2017     Priority: Medium     Fall 02/10/2017     Priority: Medium     Type 2 diabetes  mellitus with complication (H) 02/03/2017     Priority: Medium     Fall off motorized mobility scooter, initial encounter 02/03/2017     Priority: Medium     Acute decompensated heart failure (H) 02/03/2017     Priority: Medium     Overview:   HFpEF with EF 60-65% by echo 2/4/17.       Essential hypertension with goal blood pressure less than 140/90 09/13/2016     Priority: Medium     Osteoarthritis of hip 04/29/2015     Priority: Medium     Lymphedema 11/10/2014     Priority: Medium     Health Care Home 01/20/2014     Priority: Medium     State Tier Level:  Tier 3  Status:  Closed  Care Coordinator:  Pinky Oden if needed in the future.     See Letters for [facility] Care Plan         CAD - NSTEMI, CABG x 5 in 2006, angio 2013 patent grafts except occluded graft to PL 02/04/2013     Priority: Medium     Hyperlipidemia LDL goal <100 10/31/2010     Priority: Medium     Kidney stone 12/29/2009     Priority: Medium     Morbid obesity (H) 05/14/2008     Priority: Medium     Esophageal reflux 03/28/2006     Priority: Medium     Lipoma of other skin and subcutaneous tissue 09/07/2004     Priority: Medium     ANABEL on CPAP      Priority: Medium     Advanced directives, counseling/discussion 11/12/2012     Priority: Low     Advance Directive received and scanned. Click on Code in the patient header to view. 11/12/2012          History of peptic ulcer disease 10/17/2007     Priority: Low     Problem list name updated by automated process. Provider to review       Restless leg syndrome      Priority: Low      Past Medical History:   Diagnosis Date     Carpal tunnel syndrome      Coronary atherosclerosis of native coronary artery 2006    5 vessel CABG     OBSTRUCTIVE SLEEP APNEA     using CPAP     Osteoarthrosis, unspecified whether generalized or localized, unspecified site     generalized arthritis, particularly in her hands and feet         Other and combined forms of senile cataract 2000    Bilateral     Other and unspecified  hyperlipidemia      RESTLESS LEGS SYNDROME      TENOSYNOV HAND/WRIST NEC 6/30/2006     Type II or unspecified type diabetes mellitus without mention of complication, not stated as uncontrolled 2001    Diabetes mellitus/pt is diet controlled with weight loss     Typical atrial flutter (H) 01/17/2007    ablation 6/7/2017     Unspecified essential hypertension      Past Surgical History:   Procedure Laterality Date     ANGIOPLASTY       C APPENDECTOMY       C CABG, VEIN, FIVE  12/06    SVG x 4 and LIMA to LAD     C SOTO W/O FACETEC FORAMOT/DSKC 1/2 VRT SEG, LUMBAR  1968     C REMV CATARACT INTRACAP,INSERT LENS      Bilateral     C TOTAL ABDOM HYSTERECTOMY  1995     - fibroids     C VAGOTOMY/PYLOROPLASTY,SELECT  1970     COLONOSCOPY  12/3/2007     COLONOSCOPY  1/17/2014    Procedure: COLONOSCOPY;  Colonoscopy;  Surgeon: Zack Stearns MD;  Location:  GI     CYSTOSCOPY  11/25/09    [facility]     ENDOSCOPY  03/21/2000    Upper GI     HC COLONOSCOPY THRU STOMA, DIAGNOSTIC  10/7/04    poor prep, repeat in 2-3 years     HC COLONOSCOPY W/WO BRUSH/WASH  10/31/07    Repeat-poor quality prep     HC REMOVAL OF OVARY/TUBE(S)      Salpingo-Oophorectomy, Unilateral     HC REVISE MEDIAN N/CARPAL TUNNEL SURG      Carpal tunnel release     HC UGI ENDOSCOPY DIAG W BIOPSY  10/31/07     HC UGI ENDOSCOPY, SIMPLE EXAM  12/3/2007     LAPAROTOMY, LYSIS ADHESIONS, COMBINED N/A 10/24/2017    Procedure: COMBINED LAPAROTOMY, LYSIS ADHESIONS;  REPAIR FOCAL ILEAL SMALL BOWEL PERFERATION, LYSIS OF ADHESIONS.;  Surgeon: Rashard Zafar MD;  Location:  OR     OPEN REDUCTION INTERNAL FIXATION HIP NAILING Left 7/3/2016    Procedure: OPEN REDUCTION INTERNAL FIXATION HIP NAILING;  Surgeon: Lake Schulz MD;  Location:  OR     Current Outpatient Prescriptions   Medication Sig Dispense Refill     metoprolol (LOPRESSOR) 50 MG tablet Take 1 tablet (50 mg) by mouth 2 times daily 60 tablet 1     diltiazem (CARDIZEM CD) 240 MG 24 hr capsule  Take 1 capsule (240 mg) by mouth daily 90 capsule 0     NEPHROCAPS 1 MG capsule Take 1 capsule by mouth daily 100 capsule 1     amiodarone (PACERONE/CODARONE) 100 MG TABS tablet Take 2 tablets (200 mg) by mouth 2 times daily 120 tablet 0     warfarin (COUMADIN) 1 MG tablet Take 2 mg daily or as directed by coumadin clinic 60 tablet 0     HYDRALAZINE HCL PO Take 20 mg by mouth every 4 hours as needed for high blood pressure       VITAMIN D, CHOLECALCIFEROL, PO Take 5,000 Units by mouth daily       B Complex-C-Folic Acid (RENAL) 1 MG CAPS Take 1 capsule by mouth daily       pentoxifylline (TRENTAL) 400 MG CR tablet Take 400 mg by mouth daily       warfarin (COUMADIN) 2 MG tablet 1 mg daily for aflut and adjust for desired INR 2.0 to 3.0 30 tablet      LORazepam (ATIVAN) 0.5 MG tablet Take 0.5 tablets (0.25 mg) by mouth every 8 hours as needed for anxiety 20 tablet 0     hypromellose-dextran (ARTIFICAL TEARS) SOLN ophthalmic solution Place 1 drop into both eyes 3 times daily as needed for dry eyes       famotidine (PEPCID) 20 MG tablet Take 1 tablet (20 mg) by mouth 2 times daily as needed 60 tablet 1     pramipexole (MIRAPEX) 0.125 MG tablet Take 1 tablet (0.125 mg) by mouth 3 times daily 90 tablet 3     fluticasone (FLONASE) 50 MCG/ACT spray Spray 1 spray into both nostrils daily 1 Bottle 0     ipratropium - albuterol 0.5 mg/2.5 mg/3 mL (DUONEB) 0.5-2.5 (3) MG/3ML neb solution Take 1 vial by nebulization every 6 hours as needed        ACETAMINOPHEN PO Take 650 mg by mouth every 4 hours as needed for pain For pain 1-5 out of 10       nitroGLYcerin (NITROSTAT) 0.4 MG sublingual tablet Place 1 tablet (0.4 mg) under the tongue every 5 minutes as needed for chest pain 25 tablet 0     pravastatin (PRAVACHOL) 40 MG tablet Take 1 tablet (40 mg) by mouth daily 90 tablet 3     calcium acetate (PHOSLO) 667 MG CAPS capsule Take 1 capsule (667 mg) by mouth 3 times daily (with meals) 180 capsule      ORDER FOR DME Equipment being  ordered: cpap machine, mask, humidifier, tubing and filters 1 Device 0     OTC products: None, except as noted above    Allergies   Allergen Reactions     Ciprofloxacin Nausea and Vomiting     Zofran did not help     Oxycodone Visual Disturbance     Delusions, blackouts      Lisinopril Cough     Sulfa Drugs GI Disturbance     LOSS OF TASTE     Penicillins Other (See Comments)     Swelling and reddness at injection site, pt wanted it on       Latex Allergy: NO    Social History   Substance Use Topics     Smoking status: Former Smoker     Years: 10.00     Quit date: 1/1/1969     Smokeless tobacco: Never Used     Alcohol use 0.0 oz/week     0 Standard drinks or equivalent per week      Comment: 1/2 a beer once a month     History   Drug Use No       REVIEW OF SYSTEMS:                                                    Constitutional, neuro, ENT, endocrine, pulmonary, cardiac, gastrointestinal, genitourinary, musculoskeletal, integument and psychiatric systems are negative, except as otherwise noted.  Right hand swelling probably gout on prednisone.     EXAM:                                                    /72  Pulse 74  Temp 96.2  F (35.7  C) (Temporal)  Resp 16  Wt 143 lb 1.6 oz (64.9 kg)  SpO2 96%  BMI 24.56 kg/m2    GENERAL APPEARANCE: healthy, alert and no distress     EYES: EOMI, PERRL     HENT: ear canals and TM's normal and nose and mouth without ulcers or lesions     HENT: poor dentition, teeth are loose.      NECK: no adenopathy, no asymmetry, masses, or scars and thyroid normal to palpation     RESP: lungs clear to auscultation - no rales, rhonchi or wheezes     CV: regular rates and rhythm, normal S1 S2, no S3 or S4 and no murmur, click or rub     ABDOMEN:  soft, nontender, no HSM or masses and bowel sounds normal     MS: extremities normal- no gross deformities noted, no evidence of inflammation in joints, FROM in all extremities.     SKIN: no suspicious lesions or rashes     NEURO: Normal  strength and tone, sensory exam grossly normal, mentation intact and speech normal     PSYCH: mentation appears normal. and affect normal/bright     LYMPHATICS: No axillary, cervical, or supraclavicular nodes    DIAGNOSTICS:                                                      Chronic a fib treated with diltiazem    Echo in October. EF 65%, diastolic dysfunction,     Recent Labs   Lab Test 12/18/17 12/11/17 12/09/17   2120   11/01/17   0915   10/27/17   1000   10/13/17   0644   06/22/17   0658   HGB   --    --   9.0*   --    --    --   9.2*   < >   --    < >   --    PLT   --    --   255   --    --    --   320   < >   --    < >   --    INR  1.8  1.6   --    < >   --    < >   --    < >  1.19*   < >   --    NA   --    --   145*   --   142   < >  140   < >   --    < >  148*   POTASSIUM   --    --   4.0   --   4.2   < >  3.4   < >   --    < >  4.1   CR   --    --   2.56*   --   2.85*   < >  3.00*   < >   --    < >  4.19*   A1C   --    --    --    --    --    --    --    --   6.6*   --   7.3*    < > = values in this interval not displayed.    Anemic at 9.0  CKD on dialysis.      IMPRESSION:                                                    Reason for surgery/procedure: dialysis fistula    The proposed surgical procedure is considered LOW risk.    REVISED CARDIAC RISK INDEX  The patient has the following serious cardiovascular risks for perioperative complications such as (MI, PE, VFib and 3  AV Block):  High risk surgery (>5% cardiac complication risk)  Congestive Heart Failure (pulmonary edema, PND, s3 remington, CXR with pulmonary congestion, basilar rales)  Serum Creatinine >2.0 mg/dl  INTERPRETATION: 3 risks: Class IV (high risk - >11% complication rate)    The patient has the following additional risks for perioperative complications:  Dialysis dependent.    No diagnosis found.    RECOMMENDATIONS:                                                      --Consult hospital rounder / IM to assist post-op medical  management    Cardiovascular Risk  Patient is already on a Beta Blocker. Continue Betablocker therapy after surgery, using Beta blocker order set as necessary for NPO status.      Obstructive Sleep Apnea (or suspected sleep apnea)  Patient is to bring their home CPAP with them on the day of surgery          --Patient is to take all scheduled medications on the day of surgery EXCEPT for modifications listed below.  Waiting to hear on her anti-coagulation from her surgeon.      APPROVAL GIVEN to proceed with proposed procedure, without further diagnostic evaluation       Signed Electronically by: Dheeraj Jj MD  I would agree the right hand is gout, agree with prednisone treatment.  Certainly can ice the hand some as well.        Copy of this evaluation report is provided to requesting physician.    [place] Preop Guidelines

## 2017-12-19 NOTE — MR AVS SNAPSHOT
After Visit Summary   12/19/2017    Kathryn Banks    MRN: [id number]           Patient Information     Date Of Birth          1940        Visit Information        Provider Department      12/19/2017 4:00 PM Dheeraj Jj MD [facility]        Today's Diagnoses     Preop general physical exam    -  1      Care Instructions      Before Your Surgery      Call your surgeon if there is any change in your health. This includes signs of a cold or flu (such as a sore throat, runny nose, cough, rash or fever).    Do not smoke, drink alcohol or take over the counter medicine (unless your surgeon or primary care doctor tells you to) for the 24 hours before and after surgery.    If you take prescribed drugs: Follow your doctor s orders about which medicines to take and which to stop until after surgery.    Eating and drinking prior to surgery: follow the instructions from your surgeon    Take a shower or bath the night before surgery. Use the soap your surgeon gave you to gently clean your skin. If you do not have soap from your surgeon, use your regular soap. Do not shave or scrub the surgery site.  Wear clean pajamas and have clean sheets on your bed.           Follow-ups after your visit        Your next 10 appointments already scheduled     Jan 02, 2018   Procedure with Cassie Cardenas MD   [facility], [place], Same Day Surgery (--)    [address] [place] [address] [phone]              Who to contact     If you have questions or need follow up information about today's clinic visit or your schedule please contact [facility] directly at [phone].  Normal or non-critical lab and imaging results will be communicated to you by MyChart, letter or phone within 4 business days after the clinic has received the results. If you do not hear from us within 7 days, please contact the clinic through MyChart or phone. If you have a critical or abnormal lab result, we will  notify you by phone as soon as possible.  Submit refill requests through [employer] or call your pharmacy and they will forward the refill request to us. Please allow 3 business days for your refill to be completed.          Additional Information About Your Visit        [employer] Information     [employer] gives you secure access to your electronic health record. If you see a primary care provider, you can also send messages to your care team and make appointments. If you have questions, please call your primary care clinic.  If you do not have a primary care provider, please call [phone] and they will assist you.        Care EveryWhere ID     This is your Care EveryWhere ID. This could be used by other organizations to access your [place] medical records  [id number]        Your Vitals Were     Pulse Temperature Respirations Pulse Oximetry BMI (Body Mass Index)       74 96.2  F (35.7  C) (Temporal) 16 96% 24.56 kg/m2        Blood Pressure from Last 3 Encounters:   12/19/17 142/72   12/09/17 148/79   11/21/17 161/76    Weight from Last 3 Encounters:   12/19/17 143 lb 1.6 oz (64.9 kg)   12/09/17 153 lb (69.4 kg)   11/21/17 151 lb (68.5 kg)              Today, you had the following     No orders found for display       Primary Care Provider Office Phone # Fax #    Dheeraj Jj [phone]       [facility] [address] [place] [address]        Equal Access to Services     [facility]: Hadii aad ku hadasho Soomaali, waaxda luqadaha, qaybta kaalmada adeegyada, tay banda la'evelia ah. So [facility] [phone].    ATENCIÓN: Si habla español, tiene a medrano disposición servicios gratuitos de asistencia lingüística. Llame al [phone].    We comply with applicable federal civil rights laws and [place] laws. We do not discriminate on the basis of race, color, national origin, age, disability, sex, sexual orientation, or gender identity.            Thank you!     Thank  you for choosing [facility]  for your care. Our goal is always to provide you with excellent care. Hearing back from our patients is one way we can continue to improve our services. Please take a few minutes to complete the written survey that you may receive in the mail after your visit with us. Thank you!             Your Updated Medication List - Protect others around you: Learn how to safely use, store and throw away your medicines at www.disposemymeds.org.          This list is accurate as of: 12/19/17  4:16 PM.  Always use your most recent med list.                   Brand Name Dispense Instructions for use Diagnosis    ACETAMINOPHEN PO      Take 650 mg by mouth every 4 hours as needed for pain For pain 1-5 out of 10        amiodarone 100 MG Tabs tablet    PACERONE/CODARONE    120 tablet    Take 2 tablets (200 mg) by mouth 2 times daily    Atrial flutter, unspecified type (H)       calcium acetate 667 MG Caps capsule    PHOSLO    180 capsule    Take 1 capsule (667 mg) by mouth 3 times daily (with meals)    Chronic kidney disease, unspecified CKD stage       diltiazem 240 MG 24 hr capsule    CARDIZEM CD    90 capsule    Take 1 capsule (240 mg) by mouth daily    Paroxysmal atrial fibrillation (H)       famotidine 20 MG tablet    PEPCID    60 tablet    Take 1 tablet (20 mg) by mouth 2 times daily as needed    Gastroesophageal reflux disease without esophagitis       fluticasone 50 MCG/ACT spray    FLONASE    1 Bottle    Spray 1 spray into both nostrils daily    Nasal congestion       HYDRALAZINE HCL PO      Take 20 mg by mouth every 4 hours as needed for high blood pressure        hypromellose-dextran Soln ophthalmic solution      Place 1 drop into both eyes 3 times daily as needed for dry eyes    Dry eyes       ipratropium - albuterol 0.5 mg/2.5 mg/3 mL 0.5-2.5 (3) MG/3ML neb solution    DUONEB     Take 1 vial by nebulization every 6 hours as needed        LORazepam 0.5 MG tablet    ATIVAN    20  tablet    Take 0.5 tablets (0.25 mg) by mouth every 8 hours as needed for anxiety    Anxiety       metoprolol 50 MG tablet    LOPRESSOR    60 tablet    Take 1 tablet (50 mg) by mouth 2 times daily    Benign essential hypertension       nitroGLYcerin 0.4 MG sublingual tablet    NITROSTAT    25 tablet    Place 1 tablet (0.4 mg) under the tongue every 5 minutes as needed for chest pain    Hx of non-ST elevation myocardial infarction (NSTEMI), Atherosclerosis of native coronary artery of native heart without angina pectoris, Postsurgical aortocoronary bypass status       order for DME     1 Device    Equipment being ordered: cpap machine, mask, humidifier, tubing and filters    ANABEL (obstructive sleep apnea)       pentoxifylline 400 MG CR tablet    TRENtal     Take 400 mg by mouth daily        pramipexole 0.125 MG tablet    MIRAPEX    90 tablet    Take 1 tablet (0.125 mg) by mouth 3 times daily    Restless leg syndrome       pravastatin 40 MG tablet    PRAVACHOL    90 tablet    Take 1 tablet (40 mg) by mouth daily    Hx of non-ST elevation myocardial infarction (NSTEMI), Atherosclerosis of native coronary artery of native heart without angina pectoris, Type 2 diabetes mellitus with other circulatory complications       * RENAL 1 MG Caps      Take 1 capsule by mouth daily        * NEPHROCAPS 1 MG capsule     100 capsule    Take 1 capsule by mouth daily    ESRD (end stage renal disease) on dialysis (H)       VITAMIN D (CHOLECALCIFEROL) PO      Take 5,000 Units by mouth daily        * warfarin 2 MG tablet    COUMADIN    30 tablet    1 mg daily for aflut and adjust for desired INR 2.0 to 3.0    Atrial flutter, unspecified type (H)       * warfarin 1 MG tablet    COUMADIN    60 tablet    Take 2 mg daily or as directed by coumadin clinic    Atrial flutter, unspecified type (H), Paroxysmal atrial fibrillation (H)       * Notice:  This list has 4 medication(s) that are the same as other medications prescribed for you. Read the  directions carefully, and ask your doctor or other care provider to review them with you.

## 2017-12-20 NOTE — TELEPHONE ENCOUNTER
Med rec completed by RN. Chart and forms forwarded to PCP for signatures.  Christina Hernandez RN

## 2017-12-21 ENCOUNTER — ANTICOAGULATION THERAPY VISIT (OUTPATIENT)
Dept: ANTICOAGULATION | Facility: CLINIC | Age: 77
End: 2017-12-21
Payer: COMMERCIAL

## 2017-12-21 ENCOUNTER — DOCUMENTATION ONLY (OUTPATIENT)
Dept: CARE COORDINATION | Facility: CLINIC | Age: 77
End: 2017-12-21

## 2017-12-21 ENCOUNTER — TELEPHONE (OUTPATIENT)
Dept: VASCULAR SURGERY | Facility: CLINIC | Age: 77
End: 2017-12-21

## 2017-12-21 ENCOUNTER — TELEPHONE (OUTPATIENT)
Dept: INTERNAL MEDICINE | Facility: CLINIC | Age: 77
End: 2017-12-21

## 2017-12-21 DIAGNOSIS — I73.9 PAD (PERIPHERAL ARTERY DISEASE) (H): Primary | ICD-10-CM

## 2017-12-21 LAB — INR PPP: 1.8

## 2017-12-21 PROCEDURE — 99207 ZZC NO CHARGE NURSE ONLY: CPT | Performed by: INTERNAL MEDICINE

## 2017-12-21 NOTE — TELEPHONE ENCOUNTER
Due to a change in the providers schedule, pt needs to r/s 1/2 surgery with Dr. Cardenas. Tried calling pt at number listed on account. Line is busy. Will try again. Also will mail letter

## 2017-12-21 NOTE — TELEPHONE ENCOUNTER
Trental 400 MG            Last Written Prescription Date: 12/19/17  Last Fill Quantity: , # refills:     Last Office Visit with [facility] or [facility] prescribing provider:  12/19/17   Future Office Visit:       Lab Results   Component Value Date    WBC 11.0 12/09/2017     Lab Results   Component Value Date    RBC 3.14 12/09/2017     Lab Results   Component Value Date    HGB 9.0 12/09/2017     Lab Results   Component Value Date    HCT 30.8 12/09/2017     No components found for: MCT  Lab Results   Component Value Date    MCV 98 12/09/2017     Lab Results   Component Value Date    MCH 28.7 12/09/2017     Lab Results   Component Value Date    MCHC 29.2 12/09/2017     Lab Results   Component Value Date    RDW 21.9 12/09/2017     Lab Results   Component Value Date     12/09/2017     Lab Results   Component Value Date    AST 14 10/22/2017     Lab Results   Component Value Date    ALT 9 10/22/2017     Creatinine   Date Value Ref Range Status   12/09/2017 2.56 (H) 0.52 - 1.04 mg/dL Final   ]

## 2017-12-21 NOTE — TELEPHONE ENCOUNTER
Reason for Call:  INR    Who is calling? [facility]    Phone number: [phone]    Fax number:      Name of caller: Anisa    INR Value: 1.8    Are there any other concerns:  Yes: Patient just started prednisone 2 days ago.    Can we leave a detailed message on this number? YES    Phone number patient can be reached at:       Call taken on 12/21/2017 at 8:30 AM by Erica Boo

## 2017-12-21 NOTE — MR AVS SNAPSHOT
Kathryn MICKY Banks   12/21/2017   Anticoagulation Therapy Visit    Description:  77 year old female   Provider:  Dheeraj Jj MD   Department:  Ph Anticoag           INR as of 12/21/2017     Today's INR 1.8!      Anticoagulation Summary as of 12/21/2017     INR goal 2.0-3.0   Today's INR 1.8!   Full instructions 12/21: 3 mg; 12/22: 2 mg; 12/23: 2 mg; 12/24: 3 mg; 12/25: 2 mg; Otherwise No maintenance plan   Next INR check 12/26/2017    Indications   Atrial fibrillation (H) (Resolved) [I48.91]  Long-term (current) use of anticoagulants [Z79.01] (Resolved) [Z79.01]         Contact Numbers     Clinic Number:         December 2017 Details    Sun Mon Tue Wed Thu Fri Sat          1               2                 3               4               5               6               7               8               9                 10               11               12               13               14               15               16                 17               18               19               20               21      3 mg   See details      22      2 mg         23      2 mg           24      3 mg         25      2 mg         26            27               28               29               30                 31                      Date Details   12/21 This INR check       Date of next INR:  12/26/2017         How to take your warfarin dose     To take:  2 mg Take 2 of the 1 mg tablets.    To take:  3 mg Take 3 of the 1 mg tablets.

## 2017-12-21 NOTE — PROGRESS NOTES
ANTICOAGULATION FOLLOW-UP CLINIC VISIT    Patient Name:  Kathryn Banks  Date:  12/21/2017  Contact Type:  Telephone    SUBJECTIVE:     Patient Findings     Positives Inflammation (Pt is on 4 out of 5 days of prednisone for a gout flare up--this can increase INR, but has not done so after having 3 days in her system), Unexplained INR or factor level change    Comments S/O:  Anisa from  home care calls with pt's INR today of 1.8.  Kathryn Banks is on Coumadin for A. Fib.  Current Coumadin dose is 3 mg Mon and 2 mg ROW=15 mg.   Concerns today are: Pt started prednisone for gout flare up on Monday, is done with prednisone Friday.    A/P: Kathryn Banks's INR is Subtherapeutic  for his/her goal range of 2 - 3.  Reasons why INR is out of range may include:unknown.  Recommended to have Kathryn Banks increase Coumadin dose to 3 mg Thurs, Sun and 2 mg ROW and to have his/her INR rechecked in 5 days on 12/26/17.      Yovani Ruvalcaba RN, BSN               OBJECTIVE    INR   Date Value Ref Range Status   12/21/2017 1.8  Final       ASSESSMENT / PLAN  INR assessment SUB    Recheck INR In: 5 DAYS    INR Location [facility] INR      Anticoagulation Summary as of 12/21/2017     INR goal 2.0-3.0   Today's INR 1.8!   Maintenance plan No maintenance plan   Full instructions 12/21: 3 mg; 12/22: 2 mg; 12/23: 2 mg; 12/24: 3 mg; 12/25: 2 mg; Otherwise No maintenance plan   Next INR check 12/26/2017   Target end date     Indications   Atrial fibrillation (H) (Resolved) [I48.91]  Long-term (current) use of anticoagulants [Z79.01] (Resolved) [Z79.01]         Anticoagulation Episode Summary     INR check location     Preferred lab     Send INR reminders to [facility] POOL    Comments 1 mg, 2.5 mg, and 3 mg tabs (as of 11/27, per  nurse)        Anticoagulation Care Providers     Provider Role Specialty Phone number    Dheeraj Jj MD [facility] Internal Medicine [phone]            See the Encounter Report to view  Anticoagulation Flowsheet and Dosing Calendar (Go to Encounters tab in chart review, and find the Anticoagulation Therapy Visit)    Dosage adjustment made based on physician directed care plan.    Yovani Ruvalcaba RN

## 2017-12-22 PROCEDURE — G0180 MD CERTIFICATION HHA PATIENT: HCPCS | Performed by: INTERNAL MEDICINE

## 2017-12-22 RX ORDER — PENTOXIFYLLINE 400 MG/1
400 TABLET, EXTENDED RELEASE ORAL DAILY
Qty: 90 TABLET | Refills: 0 | Status: SHIPPED | OUTPATIENT
Start: 2017-12-22 | End: 2018-07-20

## 2017-12-22 NOTE — TELEPHONE ENCOUNTER
Trental  Routing refill request to provider for review/approval because:  Labs out of range:  Hgb, Creatinine  Medication is reported/historical    Yovani Ruvalcaba RN, BSN      Requested Prescriptions   Pending Prescriptions Disp Refills     pentoxifylline (TRENTAL) 400 MG CR tablet 90 tablet 0     Sig: Take 1 tablet (400 mg) by mouth daily    Platelet Inhibitors Failed    12/21/2017 11:06 AM       Failed - Normal serum creatinine on file in past 12 months    Recent Labs   Lab Test  12/09/17 2120   CR  2.56*            Passed - Normal ALT on file in past 12 months    Recent Labs   Lab Test  10/22/17   0305   ALT  9            Passed - Normal HGB on file in past 12 months    Recent Labs   Lab Test  12/09/17 2120   HGB  9.0*              Passed - Normal AST on file in past 12 months    Recent Labs   Lab Test  10/22/17   0305   AST  14            Passed - Normal Platelets on file in past 12 months    Recent Labs   Lab Test  12/09/17 2120   PLT  255              Passed - Recent or future visit with authorizing provider's specialty    Patient had office visit in the last year or has a visit in the next 30 days with authorizing provider.  See chart review.              Passed - Patient is age 18 or older       Passed - No active pregnancy on record       Passed - No positive pregnancy test in past 12 months

## 2017-12-26 ENCOUNTER — DOCUMENTATION ONLY (OUTPATIENT)
Dept: CARE COORDINATION | Facility: CLINIC | Age: 77
End: 2017-12-26

## 2017-12-26 ENCOUNTER — TELEPHONE (OUTPATIENT)
Dept: INTERNAL MEDICINE | Facility: CLINIC | Age: 77
End: 2017-12-26

## 2017-12-26 ENCOUNTER — ANTICOAGULATION THERAPY VISIT (OUTPATIENT)
Dept: ANTICOAGULATION | Facility: CLINIC | Age: 77
End: 2017-12-26
Payer: COMMERCIAL

## 2017-12-26 DIAGNOSIS — I48.92 ATRIAL FLUTTER, UNSPECIFIED TYPE (H): ICD-10-CM

## 2017-12-26 DIAGNOSIS — I48.0 PAROXYSMAL ATRIAL FIBRILLATION (H): ICD-10-CM

## 2017-12-26 LAB — INR PPP: 3.4

## 2017-12-26 RX ORDER — WARFARIN SODIUM 1 MG/1
TABLET ORAL
Qty: 180 TABLET | Refills: 0 | Status: SHIPPED | OUTPATIENT
Start: 2017-12-26 | End: 2018-04-03

## 2017-12-26 NOTE — TELEPHONE ENCOUNTER
Johnr contacted patient's home care nurse Anisa who said patient is doing well on the Metoprolol 50mg BID. Johnr will refill metoprolol 50mg BID through her pharmacy.

## 2017-12-26 NOTE — TELEPHONE ENCOUNTER
Reason for Call:  INR    Who is calling?  Home Care: [facility]    Phone number:  [phone]    Fax number:  na    Name of caller: Anisa    INR Value:  3.4    Are there any other concerns:  No - but nurse is in home right now to set up meds    Can we leave a detailed message on this number? YES    Phone number patient can be reached at: [phone]      Call taken on 12/26/2017 at 8:34 AM by Zo Cardenas

## 2017-12-26 NOTE — MR AVS SNAPSHOT
Kathryn WEEKS Jocelyn   12/26/2017   Anticoagulation Therapy Visit    Description:  77 year old female   Provider:  Dheeraj Jj MD   Department:  Ph Anticoag           INR as of 12/26/2017     Today's INR 3.4!      Anticoagulation Summary as of 12/26/2017     INR goal 2.0-3.0   Today's INR 3.4!   Full instructions 3 mg on Sun; 2 mg all other days   Next INR check 1/4/2018    Indications   Atrial fibrillation (H) (Resolved) [I48.91]  Long-term (current) use of anticoagulants [Z79.01] (Resolved) [Z79.01]         Contact Numbers     Clinic Number:         December 2017 Details    Sun Mon Tue Wed Thu Fri Sat          1               2                 3               4               5               6               7               8               9                 10               11               12               13               14               15               16                 17               18               19               20               21               22               23                 24               25               26      2 mg   See details      27      2 mg         28      2 mg         29      2 mg         30      2 mg           31      3 mg                Date Details   12/26 This INR check               How to take your warfarin dose     To take:  2 mg Take 2 of the 1 mg tablets.    To take:  3 mg Take 3 of the 1 mg tablets.           January 2018 Details    Sun Mon Tue Wed Thu Fri Sat      1      2 mg         2      2 mg         3      2 mg         4            5               6                 7               8               9               10               11               12               13                 14               15               16               17               18               19               20                 21               22               23               24               25               26               27                 28               29               30                31                   Date Details   No additional details    Date of next INR:  1/4/2018         How to take your warfarin dose     To take:  2 mg Take 2 of the 1 mg tablets.

## 2017-12-26 NOTE — PROGRESS NOTES
ANTICOAGULATION FOLLOW-UP CLINIC VISIT    Patient Name:  Kathryn Banks  Date:  12/26/2017  Contact Type:  Telephone    SUBJECTIVE:     Patient Findings     Positives No Problem Findings    Comments Reason for Call:  INR     Who is calling?  [facility]      Phone number:  [phone]     Fax number:  na     Name of caller: Anisa     INR Value:  3.4     Are there any other concerns:  No - but nurse is in home right now to set up meds     Can we leave a detailed message on this number? YES     Phone number patient can be reached at: [phone]        Call taken on 12/26/2017 at 8:34 AM by Zo Cardenas           OBJECTIVE    INR   Date Value Ref Range Status   12/26/2017 3.4  Final       ASSESSMENT / PLAN  INR assessment SUPRA    Recheck INR In: 8 DAYS    INR Location [facility] INR      Anticoagulation Summary as of 12/26/2017     INR goal 2.0-3.0   Today's INR 3.4!   Maintenance plan 3 mg (1 mg x 3) on Sun; 2 mg (1 mg x 2) all other days   Full instructions 3 mg on Sun; 2 mg all other days   Weekly total 15 mg   Plan last modified Nicki Calvillo, RN (12/26/2017)   Next INR check 1/4/2018   Target end date     Indications   Atrial fibrillation (H) (Resolved) [I48.91]  Long-term (current) use of anticoagulants [Z79.01] (Resolved) [Z79.01]         Anticoagulation Episode Summary     INR check location     Preferred lab     Send INR reminders to [facility] POOL    Comments 1 mg, 2.5 mg, and 3 mg tabs (as of 11/27, per  nurse)        Anticoagulation Care Providers     Provider Role Specialty Phone number    Dheeraj Jj MD [facility] Internal Medicine [phone]            See the Encounter Report to view Anticoagulation Flowsheet and Dosing Calendar (Go to Encounters tab in chart review, and find the Anticoagulation Therapy Visit)    Dosage adjustment made based on physician directed care plan.    Nicki Calvillo, ROSARIO

## 2017-12-26 NOTE — PROGRESS NOTES
[facility] now requests orders and shares plan of care/discharge summaries for some patients through [employer].  Please REPLY TO THIS MESSAGE in order to give authorization for orders when needed.  This is considered a verbal order, you will still receive a faxed copy of orders for signature.  Thank you for your assistance in improving collaboration for our patients.    Pt was seen for homecare visit today, /84, rest of VSS, wt is 147, up 6 lbs in 5 days, lungs ctab.  Cough with clear sputum.  Edema in R hand is 3 plus pitting, which is up from last week. Denies any increased SOB, chest pains or heart palpitations.     Please let me know if you have any recommendations.    Thank you,  Anisa Licea, ROSARIO    [email or web address] [facility]

## 2017-12-26 NOTE — PROGRESS NOTES
She has to watch what she is eating and how much she is drinking.  She probably went 3 days without dialysis so they should correct her weight and volume overload.

## 2018-01-01 ENCOUNTER — ANTICOAGULATION THERAPY VISIT (OUTPATIENT)
Dept: ANTICOAGULATION | Facility: CLINIC | Age: 78
End: 2018-01-01
Payer: COMMERCIAL

## 2018-01-01 ENCOUNTER — TELEPHONE (OUTPATIENT)
Dept: INTERNAL MEDICINE | Facility: CLINIC | Age: 78
End: 2018-01-01

## 2018-01-01 ENCOUNTER — OFFICE VISIT (OUTPATIENT)
Dept: CARDIOLOGY | Facility: CLINIC | Age: 78
End: 2018-01-01
Payer: COMMERCIAL

## 2018-01-01 ENCOUNTER — ANTICOAGULATION THERAPY VISIT (OUTPATIENT)
Dept: INTERNAL MEDICINE | Facility: CLINIC | Age: 78
End: 2018-01-01
Payer: COMMERCIAL

## 2018-01-01 ENCOUNTER — DOCUMENTATION ONLY (OUTPATIENT)
Dept: CARE COORDINATION | Facility: CLINIC | Age: 78
End: 2018-01-01

## 2018-01-01 ENCOUNTER — OFFICE VISIT (OUTPATIENT)
Dept: INTERNAL MEDICINE | Facility: CLINIC | Age: 78
End: 2018-01-01
Payer: COMMERCIAL

## 2018-01-01 ENCOUNTER — HOSPITAL ENCOUNTER (OUTPATIENT)
Dept: CARDIOLOGY | Facility: CLINIC | Age: 78
Discharge: HOME | End: 2018-11-29
Attending: INTERNAL MEDICINE | Admitting: INTERNAL MEDICINE
Payer: MEDICARE

## 2018-01-01 ENCOUNTER — TRANSFERRED RECORDS (OUTPATIENT)
Dept: HEALTH INFORMATION MANAGEMENT | Facility: CLINIC | Age: 78
End: 2018-01-01

## 2018-01-01 ENCOUNTER — DOCUMENTATION ONLY (OUTPATIENT)
Dept: INTERNAL MEDICINE | Facility: CLINIC | Age: 78
End: 2018-01-01

## 2018-01-01 ENCOUNTER — APPOINTMENT (OUTPATIENT)
Dept: GENERAL RADIOLOGY | Facility: CLINIC | Age: 78
End: 2018-01-01
Attending: NURSE PRACTITIONER
Payer: MEDICARE

## 2018-01-01 ENCOUNTER — TELEPHONE (OUTPATIENT)
Dept: FAMILY MEDICINE | Facility: CLINIC | Age: 78
End: 2018-01-01

## 2018-01-01 ENCOUNTER — TELEPHONE (OUTPATIENT)
Dept: CARDIOLOGY | Facility: CLINIC | Age: 78
End: 2018-01-01

## 2018-01-01 ENCOUNTER — HOSPITAL ENCOUNTER (EMERGENCY)
Facility: CLINIC | Age: 78
Discharge: HOME | End: 2018-10-01
Attending: NURSE PRACTITIONER | Admitting: NURSE PRACTITIONER
Payer: MEDICARE

## 2018-01-01 ENCOUNTER — MEDICAL CORRESPONDENCE (OUTPATIENT)
Dept: HEALTH INFORMATION MANAGEMENT | Facility: CLINIC | Age: 78
End: 2018-01-01

## 2018-01-01 ENCOUNTER — HOSPITAL ENCOUNTER (OUTPATIENT)
Facility: CLINIC | Age: 78
End: 2018-01-01
Attending: INTERNAL MEDICINE | Admitting: INTERNAL MEDICINE
Payer: MEDICARE

## 2018-01-01 VITALS
DIASTOLIC BLOOD PRESSURE: 46 MMHG | BODY MASS INDEX: 25.54 KG/M2 | RESPIRATION RATE: 22 BRPM | WEIGHT: 148.8 LBS | OXYGEN SATURATION: 94 % | SYSTOLIC BLOOD PRESSURE: 117 MMHG | TEMPERATURE: 99.8 F

## 2018-01-01 VITALS
WEIGHT: 143.2 LBS | BODY MASS INDEX: 24.45 KG/M2 | HEIGHT: 64 IN | DIASTOLIC BLOOD PRESSURE: 70 MMHG | SYSTOLIC BLOOD PRESSURE: 132 MMHG | HEART RATE: 66 BPM | OXYGEN SATURATION: 93 %

## 2018-01-01 VITALS
BODY MASS INDEX: 24.16 KG/M2 | DIASTOLIC BLOOD PRESSURE: 62 MMHG | RESPIRATION RATE: 12 BRPM | HEIGHT: 64 IN | WEIGHT: 141.5 LBS | OXYGEN SATURATION: 94 % | SYSTOLIC BLOOD PRESSURE: 140 MMHG | HEART RATE: 90 BPM

## 2018-01-01 VITALS
HEART RATE: 96 BPM | RESPIRATION RATE: 16 BRPM | DIASTOLIC BLOOD PRESSURE: 66 MMHG | WEIGHT: 147 LBS | SYSTOLIC BLOOD PRESSURE: 138 MMHG | BODY MASS INDEX: 25.1 KG/M2 | TEMPERATURE: 98.1 F | HEIGHT: 64 IN | OXYGEN SATURATION: 95 %

## 2018-01-01 DIAGNOSIS — Z79.01 LONG TERM CURRENT USE OF ANTICOAGULANT THERAPY: ICD-10-CM

## 2018-01-01 DIAGNOSIS — Z79.01 SUBTHERAPEUTIC ANTICOAGULATION: ICD-10-CM

## 2018-01-01 DIAGNOSIS — Z53.9 DIAGNOSIS NOT YET DEFINED: Primary | ICD-10-CM

## 2018-01-01 DIAGNOSIS — I48.0 PAROXYSMAL ATRIAL FIBRILLATION (H): ICD-10-CM

## 2018-01-01 DIAGNOSIS — I48.0 PAROXYSMAL ATRIAL FIBRILLATION (H): Primary | ICD-10-CM

## 2018-01-01 DIAGNOSIS — I50.32 CHRONIC DIASTOLIC HEART FAILURE (H): Primary | ICD-10-CM

## 2018-01-01 DIAGNOSIS — Z23 NEED FOR PROPHYLACTIC VACCINATION AND INOCULATION AGAINST INFLUENZA: Primary | ICD-10-CM

## 2018-01-01 DIAGNOSIS — Z99.2 ESRD (END STAGE RENAL DISEASE) ON DIALYSIS (H): ICD-10-CM

## 2018-01-01 DIAGNOSIS — I10 ESSENTIAL HYPERTENSION WITH GOAL BLOOD PRESSURE LESS THAN 140/90: ICD-10-CM

## 2018-01-01 DIAGNOSIS — N18.6 ESRD (END STAGE RENAL DISEASE) ON DIALYSIS (H): ICD-10-CM

## 2018-01-01 DIAGNOSIS — N18.5 CKD (CHRONIC KIDNEY DISEASE) STAGE 5, GFR LESS THAN 15 ML/MIN (H): ICD-10-CM

## 2018-01-01 DIAGNOSIS — Z51.81 SUBTHERAPEUTIC ANTICOAGULATION: ICD-10-CM

## 2018-01-01 LAB
ALBUMIN SERPL-MCNC: 1.9 G/DL (ref 3.4–5)
ALP SERPL-CCNC: 202 U/L (ref 40–150)
ALT SERPL W P-5'-P-CCNC: 20 U/L (ref 0–50)
ANION GAP SERPL CALCULATED.3IONS-SCNC: 4 MMOL/L (ref 3–14)
APTT PPP: 33 SEC (ref 22–37)
AST SERPL W P-5'-P-CCNC: 24 U/L (ref 0–45)
BASOPHILS # BLD AUTO: 0.1 10E9/L (ref 0–0.2)
BASOPHILS NFR BLD AUTO: 0.6 %
BILIRUB SERPL-MCNC: 0.4 MG/DL (ref 0.2–1.3)
BUN SERPL-MCNC: 13 MG/DL (ref 7–30)
CALCIUM SERPL-MCNC: 7 MG/DL (ref 8.5–10.1)
CHLORIDE SERPL-SCNC: 104 MMOL/L (ref 94–109)
CO2 SERPL-SCNC: 33 MMOL/L (ref 20–32)
CREAT SERPL-MCNC: 1.75 MG/DL (ref 0.52–1.04)
CREAT SERPL-MCNC: 2.23 MG/DL (ref 0.57–1.11)
CRP SERPL-MCNC: 41.5 MG/L (ref 0–8)
D DIMER PPP FEU-MCNC: 1.8 UG/ML FEU (ref 0–0.5)
DIFFERENTIAL METHOD BLD: ABNORMAL
EOSINOPHIL NFR BLD AUTO: 1.5 %
ERYTHROCYTE [DISTWIDTH] IN BLOOD BY AUTOMATED COUNT: 18 % (ref 10–15)
GFR SERPL CREATININE-BSD FRML MDRD: 21 ML/MIN/1.73M2
GFR SERPL CREATININE-BSD FRML MDRD: 28 ML/MIN/1.7M2
GLUCOSE SERPL-MCNC: 124 MG/DL (ref 65–100)
GLUCOSE SERPL-MCNC: 143 MG/DL (ref 70–99)
HCT VFR BLD AUTO: 37.2 % (ref 35–47)
HGB BLD-MCNC: 11.5 G/DL (ref 11.7–15.7)
IMM GRANULOCYTES # BLD: 0.1 10E9/L (ref 0–0.4)
IMM GRANULOCYTES NFR BLD: 0.6 %
INR PPP: 1.2
INR PPP: 1.25 (ref 0.86–1.14)
INR PPP: 1.3
INR PPP: 1.4
INR PPP: 1.6
INR PPP: 1.7
INR PPP: 1.7
INR PPP: 1.9
INR PPP: 2.2
INR PPP: 2.3
INR PPP: 2.4
INR PPP: 3.1
INR PPP: 3.1
INR PPP: 3.2
INR PPP: 3.5
INR PPP: 3.9
INR PPP: 4.5 (ref 0.86–1.14)
LYMPHOCYTES # BLD AUTO: 0.9 10E9/L (ref 0.8–5.3)
LYMPHOCYTES NFR BLD AUTO: 8.1 %
MCH RBC QN AUTO: 27.8 PG (ref 26.5–33)
MCHC RBC AUTO-ENTMCNC: 30.9 G/DL (ref 31.5–36.5)
MCV RBC AUTO: 90 FL (ref 78–100)
MONOCYTES # BLD AUTO: 0.8 10E9/L (ref 0–1.3)
MONOCYTES NFR BLD AUTO: 7 %
NEUTROPHILS # BLD AUTO: 8.9 10E9/L (ref 1.6–8.3)
NEUTROPHILS NFR BLD AUTO: 82.2 %
NRBC # BLD AUTO: 0 10*3/UL
NRBC BLD AUTO-RTO: 0 /100
NT-PROBNP SERPL-MCNC: ABNORMAL PG/ML (ref 0–1800)
PLATELET # BLD AUTO: 200 10E9/L (ref 150–450)
POTASSIUM SERPL-SCNC: 3.5 MMOL/L (ref 3.5–5)
POTASSIUM SERPL-SCNC: 4.3 MMOL/L (ref 3.4–5.3)
PROT SERPL-MCNC: 5.5 G/DL (ref 6.8–8.8)
RBC # BLD AUTO: 4.14 10E12/L (ref 3.8–5.2)
SODIUM SERPL-SCNC: 141 MMOL/L (ref 133–144)
TROPONIN I SERPL-MCNC: 0.04 UG/L (ref 0–0.04)
WBC # BLD AUTO: 10.8 10E9/L (ref 4–11)

## 2018-01-01 PROCEDURE — 25000128 H RX IP 250 OP 636: Performed by: NURSE PRACTITIONER

## 2018-01-01 PROCEDURE — 85730 THROMBOPLASTIN TIME PARTIAL: CPT | Performed by: NURSE PRACTITIONER

## 2018-01-01 PROCEDURE — 99207 ZZC NO CHARGE NURSE ONLY: CPT | Performed by: INTERNAL MEDICINE

## 2018-01-01 PROCEDURE — 96361 HYDRATE IV INFUSION ADD-ON: CPT | Performed by: NURSE PRACTITIONER

## 2018-01-01 PROCEDURE — 84484 ASSAY OF TROPONIN QUANT: CPT | Performed by: NURSE PRACTITIONER

## 2018-01-01 PROCEDURE — G0179 MD RECERTIFICATION HHA PT: HCPCS | Performed by: INTERNAL MEDICINE

## 2018-01-01 PROCEDURE — 99214 OFFICE O/P EST MOD 30 MIN: CPT | Mod: GW | Performed by: PHYSICIAN ASSISTANT

## 2018-01-01 PROCEDURE — 86140 C-REACTIVE PROTEIN: CPT | Performed by: NURSE PRACTITIONER

## 2018-01-01 PROCEDURE — 96376 TX/PRO/DX INJ SAME DRUG ADON: CPT | Performed by: NURSE PRACTITIONER

## 2018-01-01 PROCEDURE — 99214 OFFICE O/P EST MOD 30 MIN: CPT | Performed by: INTERNAL MEDICINE

## 2018-01-01 PROCEDURE — 71046 X-RAY EXAM CHEST 2 VIEWS: CPT | Mod: TC

## 2018-01-01 PROCEDURE — 99285 EMERGENCY DEPT VISIT HI MDM: CPT | Mod: 25 | Performed by: NURSE PRACTITIONER

## 2018-01-01 PROCEDURE — 93005 ELECTROCARDIOGRAM TRACING: CPT | Performed by: NURSE PRACTITIONER

## 2018-01-01 PROCEDURE — G0180 MD CERTIFICATION HHA PATIENT: HCPCS | Performed by: INTERNAL MEDICINE

## 2018-01-01 PROCEDURE — 83880 ASSAY OF NATRIURETIC PEPTIDE: CPT | Performed by: NURSE PRACTITIONER

## 2018-01-01 PROCEDURE — 85379 FIBRIN DEGRADATION QUANT: CPT | Performed by: NURSE PRACTITIONER

## 2018-01-01 PROCEDURE — 25000125 ZZHC RX 250: Performed by: NURSE PRACTITIONER

## 2018-01-01 PROCEDURE — 80053 COMPREHEN METABOLIC PANEL: CPT | Performed by: NURSE PRACTITIONER

## 2018-01-01 PROCEDURE — 85610 PROTHROMBIN TIME: CPT | Performed by: INTERNAL MEDICINE

## 2018-01-01 PROCEDURE — 0296T ZIO PATCH HOLTER: CPT

## 2018-01-01 PROCEDURE — 99496 TRANSJ CARE MGMT HIGH F2F 7D: CPT | Performed by: INTERNAL MEDICINE

## 2018-01-01 PROCEDURE — 96365 THER/PROPH/DIAG IV INF INIT: CPT | Performed by: NURSE PRACTITIONER

## 2018-01-01 PROCEDURE — 93010 ELECTROCARDIOGRAM REPORT: CPT | Mod: Z6 | Performed by: NURSE PRACTITIONER

## 2018-01-01 PROCEDURE — 85610 PROTHROMBIN TIME: CPT | Performed by: NURSE PRACTITIONER

## 2018-01-01 PROCEDURE — 85025 COMPLETE CBC W/AUTO DIFF WBC: CPT | Performed by: NURSE PRACTITIONER

## 2018-01-01 PROCEDURE — 96366 THER/PROPH/DIAG IV INF ADDON: CPT | Performed by: NURSE PRACTITIONER

## 2018-01-01 PROCEDURE — 93010 ELECTROCARDIOGRAM REPORT: CPT | Mod: 76 | Performed by: NURSE PRACTITIONER

## 2018-01-01 PROCEDURE — G0008 ADMIN INFLUENZA VIRUS VAC: HCPCS | Performed by: INTERNAL MEDICINE

## 2018-01-01 PROCEDURE — 93005 ELECTROCARDIOGRAM TRACING: CPT | Mod: 76 | Performed by: NURSE PRACTITIONER

## 2018-01-01 PROCEDURE — 90662 IIV NO PRSV INCREASED AG IM: CPT | Performed by: INTERNAL MEDICINE

## 2018-01-01 RX ORDER — WARFARIN SODIUM 2.5 MG/1
TABLET ORAL
Qty: 40 TABLET | Refills: 1 | Status: SHIPPED | OUTPATIENT
Start: 2018-01-01 | End: 2019-01-01

## 2018-01-01 RX ORDER — AMIODARONE HYDROCHLORIDE 200 MG/1
200 TABLET ORAL DAILY
COMMUNITY
End: 2018-01-01 | Stop reason: ALTCHOICE

## 2018-01-01 RX ORDER — DILTIAZEM HYDROCHLORIDE 300 MG/1
300 CAPSULE, COATED, EXTENDED RELEASE ORAL DAILY
COMMUNITY
Start: 2018-08-30 | End: 2018-01-01

## 2018-01-01 RX ORDER — DILTIAZEM HYDROCHLORIDE 180 MG/1
180 CAPSULE, COATED, EXTENDED RELEASE ORAL DAILY
Qty: 30 CAPSULE | Refills: 5 | Status: SHIPPED | OUTPATIENT
Start: 2018-01-01 | End: 2019-01-01

## 2018-01-01 RX ORDER — DILTIAZEM HYDROCHLORIDE 300 MG/1
300 CAPSULE, COATED, EXTENDED RELEASE ORAL DAILY
Qty: 30 CAPSULE | Refills: 1 | Status: SHIPPED | OUTPATIENT
Start: 2018-01-01 | End: 2018-01-01

## 2018-01-01 RX ORDER — DILTIAZEM HYDROCHLORIDE 5 MG/ML
20 INJECTION INTRAVENOUS ONCE
Status: COMPLETED | OUTPATIENT
Start: 2018-01-01 | End: 2018-01-01

## 2018-01-01 RX ORDER — METOPROLOL TARTRATE 25 MG/1
50 TABLET, FILM COATED ORAL 2 TIMES DAILY
Qty: 180 TABLET | Refills: 1 | Status: SHIPPED | OUTPATIENT
Start: 2018-01-01 | End: 2019-01-01

## 2018-01-01 RX ORDER — SODIUM CHLORIDE 9 MG/ML
INJECTION, SOLUTION INTRAVENOUS CONTINUOUS
Status: DISCONTINUED | OUTPATIENT
Start: 2018-01-01 | End: 2018-01-01 | Stop reason: HOSPADM

## 2018-01-01 RX ORDER — METOPROLOL TARTRATE 25 MG/1
25 TABLET, FILM COATED ORAL 2 TIMES DAILY
Qty: 180 TABLET | Refills: 1 | Status: SHIPPED | OUTPATIENT
Start: 2018-01-01 | End: 2018-01-01

## 2018-01-01 RX ADMIN — SODIUM CHLORIDE: 9 INJECTION, SOLUTION INTRAVENOUS at 15:44

## 2018-01-01 RX ADMIN — DILTIAZEM HYDROCHLORIDE 20 MG: 5 INJECTION INTRAVENOUS at 15:46

## 2018-01-01 RX ADMIN — DILTIAZEM HYDROCHLORIDE 5 MG/HR: 5 INJECTION INTRAVENOUS at 16:29

## 2018-01-01 ASSESSMENT — PAIN SCALES - GENERAL
PAINLEVEL: NO PAIN (0)
PAINLEVEL: NO PAIN (0)

## 2018-01-01 ASSESSMENT — MIFFLIN-ST. JEOR: SCORE: 1124.55

## 2018-01-01 ASSESSMENT — ENCOUNTER SYMPTOMS
WEAKNESS: 1
LIGHT-HEADEDNESS: 1

## 2018-01-03 ENCOUNTER — ANTICOAGULATION THERAPY VISIT (OUTPATIENT)
Dept: ANTICOAGULATION | Facility: CLINIC | Age: 78
End: 2018-01-03
Payer: COMMERCIAL

## 2018-01-03 ENCOUNTER — TELEPHONE (OUTPATIENT)
Dept: INTERNAL MEDICINE | Facility: CLINIC | Age: 78
End: 2018-01-03

## 2018-01-03 ENCOUNTER — DOCUMENTATION ONLY (OUTPATIENT)
Dept: CARE COORDINATION | Facility: CLINIC | Age: 78
End: 2018-01-03

## 2018-01-03 LAB — INR PPP: 2

## 2018-01-03 NOTE — PROGRESS NOTES
Ok for wound care, monitor closely.    Weight really needs to come down with dialysis, watch fluid and salt intake.

## 2018-01-03 NOTE — TELEPHONE ENCOUNTER
Reason for Call:  INR    Who is calling?  Home Care: [facility]    Phone number:  [phone]    Fax number:  na    Name of caller: Anisa    INR Value:  2.0    Are there any other concerns:  No    Can we leave a detailed message on this number? YES    Phone number patient can be reached at: [phone]      Call taken on 1/3/2018 at 8:37 AM by Zo Cardenas

## 2018-01-03 NOTE — PROGRESS NOTES
[facility] now requests orders and shares plan of care/discharge summaries for some patients through [employer].  Please REPLY TO THIS MESSAGE in order to give authorization for orders when needed.  This is considered a verbal order, you will still receive a faxed copy of orders for signature.  Thank you for your assistance in improving collaboration for our patients.    Pt was seen for home care visit today.  Pts wt is up 6 lbs from last week, /82, HR 72 and reg, denies any increased SOB, coughing, chest pains or heart palpitations.  1 + pitting edema in bilat LEs, 3+ pitting in R hand.      Pt dropped a heavy plate onto her L shin, near one of her stasis ulcers.  Wound measures 3cmx2.4cm, wound bed is filled with slough tissue about 80%, 20% red, small amount of serous drainage noted.     Wound care was completed as follows: wound cleansed with wound cleanser, patted dry, adaptic applied to wound bed, covered with ABD pad, wrapped with kerlix, secured with tape.      Is this ok for wound care orders or do you suggest something else?  I will put in request for WOCN to complete a visit when next available.    Thanks,     Anisa Licea, ROSARIO    [email or web address]

## 2018-01-03 NOTE — PROGRESS NOTES
ANTICOAGULATION FOLLOW-UP CLINIC VISIT    Patient Name:  Kathryn Banks  Date:  1/3/2018  Contact Type:  Telephone/ Anisa  nurse    SUBJECTIVE:     Patient Findings     Positives No Problem Findings    Comments Reason for Call:  INR     Who is calling?  [facility]: [facility]      Phone number:  [phone]     Fax number:  na     Name of caller: Anisa     INR Value:  2.0     Are there any other concerns:  No     Can we leave a detailed message on this number? YES     Phone number patient can be reached at: [phone]        Call taken on 1/3/2018 at 8:37 AM by Zo Cardenas           OBJECTIVE    INR   Date Value Ref Range Status   01/03/2018 2.0  Final       ASSESSMENT / PLAN  INR assessment THER    Recheck INR In: 1 WEEK    INR Location Home[facility] INR      Anticoagulation Summary as of 1/3/2018     INR goal 2.0-3.0   Today's INR 2.0   Maintenance plan 3 mg (1 mg x 3) on Sun; 2 mg (1 mg x 2) all other days   Full instructions 3 mg on Sun; 2 mg all other days   Weekly total 15 mg   No change documented Zo Davis, RN   Plan last modified Nicki Calvillo, RN (12/26/2017)   Next INR check 1/10/2018   Target end date     Indications   Atrial fibrillation (H) (Resolved) [I48.91]  Long-term (current) use of anticoagulants [Z79.01] (Resolved) [Z79.01]         Anticoagulation Episode Summary     INR check location     Preferred lab     Send INR reminders to [facility]    Comments 1 mg, 2.5 mg, and 3 mg tabs (as of 11/27, per  nurse)        Anticoagulation Care Providers     Provider Role Specialty Phone number    Dheeraj Jj MD [facility] Internal Medicine [phone]            See the Encounter Report to view Anticoagulation Flowsheet and Dosing Calendar (Go to Encounters tab in chart review, and find the Anticoagulation Therapy Visit)    Dosage adjustment made based on physician directed care plan.    I left a detailed voicemail with the orders reflected in flowsheet. I have also requested a call back if  there have been any missed doses, concerns, illness, fever, or if there have been any changes in medications, activity level, or diet          Zo Davis RN

## 2018-01-08 ENCOUNTER — ANESTHESIA EVENT (OUTPATIENT)
Dept: SURGERY | Facility: CLINIC | Age: 78
End: 2018-01-08
Payer: MEDICARE

## 2018-01-09 ENCOUNTER — HOSPITAL ENCOUNTER (OUTPATIENT)
Facility: CLINIC | Age: 78
Discharge: HOME | End: 2018-01-09
Attending: SURGERY | Admitting: SURGERY
Payer: MEDICARE

## 2018-01-09 ENCOUNTER — ANESTHESIA (OUTPATIENT)
Dept: SURGERY | Facility: CLINIC | Age: 78
End: 2018-01-09
Payer: MEDICARE

## 2018-01-09 VITALS
SYSTOLIC BLOOD PRESSURE: 176 MMHG | DIASTOLIC BLOOD PRESSURE: 79 MMHG | HEIGHT: 64 IN | WEIGHT: 150.35 LBS | HEART RATE: 88 BPM | RESPIRATION RATE: 12 BRPM | BODY MASS INDEX: 25.67 KG/M2 | OXYGEN SATURATION: 90 % | TEMPERATURE: 98 F

## 2018-01-09 DIAGNOSIS — Z99.2 ESRD (END STAGE RENAL DISEASE) ON DIALYSIS (H): Primary | ICD-10-CM

## 2018-01-09 DIAGNOSIS — N18.6 ESRD (END STAGE RENAL DISEASE) ON DIALYSIS (H): Primary | ICD-10-CM

## 2018-01-09 LAB
BASOPHILS # BLD AUTO: 0.1 10E9/L (ref 0–0.2)
BASOPHILS NFR BLD AUTO: 0.8 %
DIFFERENTIAL METHOD BLD: ABNORMAL
EOSINOPHIL # BLD AUTO: 0.2 10E9/L (ref 0–0.7)
EOSINOPHIL NFR BLD AUTO: 3.6 %
ERYTHROCYTE [DISTWIDTH] IN BLOOD BY AUTOMATED COUNT: 19.1 % (ref 10–15)
GLUCOSE BLDC GLUCOMTR-MCNC: 109 MG/DL (ref 70–99)
GLUCOSE BLDC GLUCOMTR-MCNC: 142 MG/DL (ref 70–99)
GLUCOSE SERPL-MCNC: 126 MG/DL (ref 70–99)
HCT VFR BLD AUTO: 33.1 % (ref 35–47)
HGB BLD-MCNC: 9.7 G/DL (ref 11.7–15.7)
IMM GRANULOCYTES # BLD: 0 10E9/L (ref 0–0.4)
IMM GRANULOCYTES NFR BLD: 0.5 %
INR PPP: 1.81 (ref 0.86–1.14)
LYMPHOCYTES # BLD AUTO: 1.2 10E9/L (ref 0.8–5.3)
LYMPHOCYTES NFR BLD AUTO: 19.7 %
MCH RBC QN AUTO: 27.6 PG (ref 26.5–33)
MCHC RBC AUTO-ENTMCNC: 29.3 G/DL (ref 31.5–36.5)
MCV RBC AUTO: 94 FL (ref 78–100)
MONOCYTES # BLD AUTO: 0.6 10E9/L (ref 0–1.3)
MONOCYTES NFR BLD AUTO: 9 %
NEUTROPHILS # BLD AUTO: 4.2 10E9/L (ref 1.6–8.3)
NEUTROPHILS NFR BLD AUTO: 66.4 %
NRBC # BLD AUTO: 0 10*3/UL
NRBC BLD AUTO-RTO: 0 /100
PLATELET # BLD AUTO: 296 10E9/L (ref 150–450)
POTASSIUM SERPL-SCNC: 3.7 MMOL/L (ref 3.4–5.3)
RBC # BLD AUTO: 3.51 10E12/L (ref 3.8–5.2)
WBC # BLD AUTO: 6.3 10E9/L (ref 4–11)

## 2018-01-09 PROCEDURE — 25000128 H RX IP 250 OP 636: Performed by: NURSE ANESTHETIST, CERTIFIED REGISTERED

## 2018-01-09 PROCEDURE — 25000125 ZZHC RX 250: Performed by: ANESTHESIOLOGY

## 2018-01-09 PROCEDURE — 93010 ELECTROCARDIOGRAM REPORT: CPT | Performed by: INTERNAL MEDICINE

## 2018-01-09 PROCEDURE — 82962 GLUCOSE BLOOD TEST: CPT

## 2018-01-09 PROCEDURE — 71000027 ZZH RECOVERY PHASE 2 EACH 15 MINS: Performed by: SURGERY

## 2018-01-09 PROCEDURE — 25000128 H RX IP 250 OP 636: Performed by: ANESTHESIOLOGY

## 2018-01-09 PROCEDURE — 37000008 ZZH ANESTHESIA TECHNICAL FEE, 1ST 30 MIN: Performed by: SURGERY

## 2018-01-09 PROCEDURE — 40000171 ZZH STATISTIC PRE-PROCEDURE ASSESSMENT III: Performed by: SURGERY

## 2018-01-09 PROCEDURE — 27210794 ZZH OR GENERAL SUPPLY STERILE: Performed by: SURGERY

## 2018-01-09 PROCEDURE — 37000009 ZZH ANESTHESIA TECHNICAL FEE, EACH ADDTL 15 MIN: Performed by: SURGERY

## 2018-01-09 PROCEDURE — 25000125 ZZHC RX 250: Performed by: NURSE ANESTHETIST, CERTIFIED REGISTERED

## 2018-01-09 PROCEDURE — 84132 ASSAY OF SERUM POTASSIUM: CPT | Performed by: CLINICAL NURSE SPECIALIST

## 2018-01-09 PROCEDURE — 25000125 ZZHC RX 250: Performed by: CLINICAL NURSE SPECIALIST

## 2018-01-09 PROCEDURE — 85610 PROTHROMBIN TIME: CPT | Performed by: ANESTHESIOLOGY

## 2018-01-09 PROCEDURE — 40000065 ZZH STATISTIC EKG NON-CHARGEABLE

## 2018-01-09 PROCEDURE — 36000057 ZZH SURGERY LEVEL 3 1ST 30 MIN - UMMC: Performed by: SURGERY

## 2018-01-09 PROCEDURE — 85025 COMPLETE CBC W/AUTO DIFF WBC: CPT | Performed by: CLINICAL NURSE SPECIALIST

## 2018-01-09 PROCEDURE — 36000059 ZZH SURGERY LEVEL 3 EA 15 ADDTL MIN UMMC: Performed by: SURGERY

## 2018-01-09 PROCEDURE — 82947 ASSAY GLUCOSE BLOOD QUANT: CPT | Performed by: CLINICAL NURSE SPECIALIST

## 2018-01-09 RX ORDER — ACETAMINOPHEN 325 MG/1
650 TABLET ORAL
Status: DISCONTINUED | OUTPATIENT
Start: 2018-01-09 | End: 2018-01-09 | Stop reason: HOSPADM

## 2018-01-09 RX ORDER — NALOXONE HYDROCHLORIDE 0.4 MG/ML
.1-.4 INJECTION, SOLUTION INTRAMUSCULAR; INTRAVENOUS; SUBCUTANEOUS
Status: DISCONTINUED | OUTPATIENT
Start: 2018-01-09 | End: 2018-01-09 | Stop reason: HOSPADM

## 2018-01-09 RX ORDER — FLUMAZENIL 0.1 MG/ML
0.2 INJECTION, SOLUTION INTRAVENOUS
Status: DISCONTINUED | OUTPATIENT
Start: 2018-01-09 | End: 2018-01-09 | Stop reason: HOSPADM

## 2018-01-09 RX ORDER — FENTANYL CITRATE 50 UG/ML
25-50 INJECTION, SOLUTION INTRAMUSCULAR; INTRAVENOUS
Status: DISCONTINUED | OUTPATIENT
Start: 2018-01-09 | End: 2018-01-09 | Stop reason: HOSPADM

## 2018-01-09 RX ORDER — BUPIVACAINE HYDROCHLORIDE 5 MG/ML
INJECTION, SOLUTION EPIDURAL; INTRACAUDAL PRN
Status: DISCONTINUED | OUTPATIENT
Start: 2018-01-09 | End: 2018-01-09

## 2018-01-09 RX ORDER — LIDOCAINE 40 MG/G
CREAM TOPICAL
Status: DISCONTINUED | OUTPATIENT
Start: 2018-01-09 | End: 2018-01-09 | Stop reason: HOSPADM

## 2018-01-09 RX ORDER — SODIUM CHLORIDE, SODIUM LACTATE, POTASSIUM CHLORIDE, CALCIUM CHLORIDE 600; 310; 30; 20 MG/100ML; MG/100ML; MG/100ML; MG/100ML
INJECTION, SOLUTION INTRAVENOUS CONTINUOUS PRN
Status: DISCONTINUED | OUTPATIENT
Start: 2018-01-09 | End: 2018-01-09

## 2018-01-09 RX ORDER — PROPOFOL 10 MG/ML
INJECTION, EMULSION INTRAVENOUS CONTINUOUS PRN
Status: DISCONTINUED | OUTPATIENT
Start: 2018-01-09 | End: 2018-01-09

## 2018-01-09 RX ORDER — SODIUM CHLORIDE, SODIUM LACTATE, POTASSIUM CHLORIDE, CALCIUM CHLORIDE 600; 310; 30; 20 MG/100ML; MG/100ML; MG/100ML; MG/100ML
INJECTION, SOLUTION INTRAVENOUS CONTINUOUS
Status: DISCONTINUED | OUTPATIENT
Start: 2018-01-09 | End: 2018-01-09 | Stop reason: HOSPADM

## 2018-01-09 RX ORDER — HYDROCODONE BITARTRATE AND ACETAMINOPHEN 5; 325 MG/1; MG/1
1 TABLET ORAL
Status: DISCONTINUED | OUTPATIENT
Start: 2018-01-09 | End: 2018-01-09 | Stop reason: HOSPADM

## 2018-01-09 RX ORDER — HEPARIN SODIUM 1000 [USP'U]/ML
INJECTION, SOLUTION INTRAVENOUS; SUBCUTANEOUS PRN
Status: DISCONTINUED | OUTPATIENT
Start: 2018-01-09 | End: 2018-01-09

## 2018-01-09 RX ORDER — ONDANSETRON 4 MG/1
4 TABLET, ORALLY DISINTEGRATING ORAL EVERY 30 MIN PRN
Status: DISCONTINUED | OUTPATIENT
Start: 2018-01-09 | End: 2018-01-09 | Stop reason: HOSPADM

## 2018-01-09 RX ORDER — SODIUM CHLORIDE 9 MG/ML
INJECTION, SOLUTION INTRAVENOUS CONTINUOUS
Status: DISCONTINUED | OUTPATIENT
Start: 2018-01-09 | End: 2018-01-09 | Stop reason: HOSPADM

## 2018-01-09 RX ORDER — METOPROLOL TARTRATE 1 MG/ML
INJECTION, SOLUTION INTRAVENOUS PRN
Status: DISCONTINUED | OUTPATIENT
Start: 2018-01-09 | End: 2018-01-09

## 2018-01-09 RX ORDER — CLINDAMYCIN PHOSPHATE 900 MG/50ML
900 INJECTION, SOLUTION INTRAVENOUS
Status: COMPLETED | OUTPATIENT
Start: 2018-01-09 | End: 2018-01-09

## 2018-01-09 RX ORDER — HYDROCODONE BITARTRATE AND ACETAMINOPHEN 5; 325 MG/1; MG/1
1-2 TABLET ORAL EVERY 4 HOURS PRN
Qty: 20 TABLET | Refills: 0 | Status: SHIPPED | OUTPATIENT
Start: 2018-01-09 | End: 2018-04-03

## 2018-01-09 RX ORDER — MEPERIDINE HYDROCHLORIDE 25 MG/ML
12.5 INJECTION INTRAMUSCULAR; INTRAVENOUS; SUBCUTANEOUS
Status: DISCONTINUED | OUTPATIENT
Start: 2018-01-09 | End: 2018-01-09 | Stop reason: HOSPADM

## 2018-01-09 RX ORDER — ONDANSETRON 2 MG/ML
4 INJECTION INTRAMUSCULAR; INTRAVENOUS EVERY 30 MIN PRN
Status: DISCONTINUED | OUTPATIENT
Start: 2018-01-09 | End: 2018-01-09 | Stop reason: HOSPADM

## 2018-01-09 RX ADMIN — PROPOFOL 60 MCG/KG/MIN: 10 INJECTION, EMULSION INTRAVENOUS at 12:32

## 2018-01-09 RX ADMIN — SODIUM CHLORIDE, POTASSIUM CHLORIDE, SODIUM LACTATE AND CALCIUM CHLORIDE: 600; 310; 30; 20 INJECTION, SOLUTION INTRAVENOUS at 11:23

## 2018-01-09 RX ADMIN — FENTANYL CITRATE 50 MCG: 50 INJECTION INTRAMUSCULAR; INTRAVENOUS at 10:50

## 2018-01-09 RX ADMIN — MIDAZOLAM HYDROCHLORIDE 0.5 MG: 1 INJECTION, SOLUTION INTRAMUSCULAR; INTRAVENOUS at 10:49

## 2018-01-09 RX ADMIN — CLINDAMYCIN PHOSPHATE 900 MG: 18 INJECTION, SOLUTION INTRAVENOUS at 11:39

## 2018-01-09 RX ADMIN — HEPARIN SODIUM 2000 UNITS: 1000 INJECTION, SOLUTION INTRAVENOUS; SUBCUTANEOUS at 12:17

## 2018-01-09 RX ADMIN — METOPROLOL TARTRATE 1 MG: 5 INJECTION INTRAVENOUS at 11:45

## 2018-01-09 RX ADMIN — BUPIVACAINE HYDROCHLORIDE 25 ML: 5 INJECTION, SOLUTION EPIDURAL; INTRACAUDAL at 10:50

## 2018-01-09 RX ADMIN — PROPOFOL 100 MCG/KG/MIN: 10 INJECTION, EMULSION INTRAVENOUS at 11:36

## 2018-01-09 NOTE — OR NURSING
Dr. Hernandez aware of HTN with 's, MD states ok to d/c but recommend taking metoprolol when getting home and monitor BP at home if able. MD also aware of O2 sats between 90-93%, MD states ok and request pt to continue using IS at home. MD verifies ok to d/c.

## 2018-01-09 NOTE — IP AVS SNAPSHOT
Same Day Surgery [address] [address]    Phone:  [phone]                                       After Visit Summary   1/9/2018    Kathryn Banks    MRN: [id number]           After Visit Summary Signature Page     I have received my discharge instructions, and my questions have been answered. I have discussed any challenges I see with this plan with the nurse or doctor.    ..........................................................................................................................................  Patient/Patient Representative Signature      ..........................................................................................................................................  Patient Representative Print Name and Relationship to Patient    ..................................................               ................................................  Date                                            Time    ..........................................................................................................................................  Reviewed by Signature/Title    ...................................................              ..............................................  Date                                                            Time

## 2018-01-09 NOTE — OR NURSING
Pt had left supraclavicular block done by Dr Samayoa and Dr. Swanson.   No complications or pt complaints.  Pt states that she feels happy and tired.

## 2018-01-09 NOTE — ANESTHESIA PROCEDURE NOTES
Peripheral Nerve Block Procedure Note    Staff:     Anesthesiologist:  ALMITA CASTELLON    Resident/CRNA:  MILA PAULINO    Block performed by resident/CRNA in the presence of a teaching physician    Location: Pre-op  Procedure Start/Stop TImes:      1/9/2018 10:50 AM     1/9/2018 11:00 AM    patient identified, IV checked, site marked, risks and benefits discussed, informed consent, monitors and equipment checked, pre-op evaluation, at physician/surgeon's request and post-op pain management      Correct Patient: Yes      Correct Position: Yes      Correct Site: Yes      Correct Procedure: Yes      Correct Laterality:  Yes    Site Marked:  Yes  Procedure details:     Procedure:  Supraclavicular    ASA:  3    Laterality:  Left    Position:  Sitting    Sterile Prep: chloraprep, mask and sterile gloves      Needle:  Short bevel and insulated    Needle gauge:  21    Needle length (mm):  110    Ultrasound: Yes      Ultrasound used to identify targeted nerve, plexus, or vascular structure and placed a needle adjacent to it      Permanent Image entered into patiient's record      Abnormal pain on injection: No      Blood Aspirated: No      Paresthesias:  No    Bleeding at site: No      Bolus via:  Needle    Infusion Method:  Single Shot    Complications:  None  Assessment/Narrative:     Injection made incrementally with aspirations every (mL):  5

## 2018-01-09 NOTE — ANESTHESIA CARE TRANSFER NOTE
Patient: Kathryn Banks    Procedure(s):  Left Upper Arm Arteriovenous Graft Creation, Anesthesia Block  - Wound Class: I-Clean    Diagnosis: End Stage Renal Disease On Dialysis   Diagnosis Additional Information: No value filed.    Anesthesia Type:   MAC, Peripheral Nerve Block     Note:  Airway :Nasal Cannula  Patient transferred to:Phase II  Comments: Pt transferred to Phase II.  VSS. Denies pain and nausea.  Report to RN.  All questions answered.Handoff Report: Identifed the Patient, Identified the Reponsible Provider, Reviewed the pertinent medical history, Discussed the surgical course, Reviewed Intra-OP anesthesia mangement and issues during anesthesia, Set expectations for post-procedure period and Allowed opportunity for questions and acknowledgement of understanding      Vitals: (Last set prior to Anesthesia Care Transfer)    CRNA VITALS  1/9/2018 1259 - 1/9/2018 1329      1/9/2018             Resp Rate (observed): (!)  1                Electronically Signed By: RHODA Richardson CRNA  January 9, 2018  1:29 PM

## 2018-01-09 NOTE — OR NURSING
The following page was sent to Dr. Lejeune:Maria Del Rosario S-preop: can pt resume Coumadin tonight? [facility] [id number]: Dr. Lejeune called back and states pt should take coumadin tonight and recommends taking cardizem tomorrow d/t long acting dose but to resume metoprolol on d/c.

## 2018-01-09 NOTE — ANESTHESIA PREPROCEDURE EVALUATION
Anesthesia Evaluation     . Pt has had prior anesthetic. Type: General    No history of anesthetic complications          ROS/MED HX    ENT/Pulmonary:     (+)sleep apnea, uses CPAP , . .    Neurologic:       Cardiovascular:     (+) hypertension--CAD, --. : . CHF Last EF: 65 . . :. . pulmonary hypertension,       METS/Exercise Tolerance:     Hematologic:         Musculoskeletal:         GI/Hepatic:     (+) GERD       Renal/Genitourinary:     (+) chronic renal disease, type: ESRD,       Endo:     (+) type II DM Obesity, .      Psychiatric:         Infectious Disease:         Malignancy:         Other:                     Physical Exam  Normal systems: cardiovascular, pulmonary and dental    Airway   Mallampati: II  TM distance: >3 FB  Neck ROM: full    Dental     Cardiovascular       Pulmonary                     Anesthesia Plan      History & Physical Review  History and physical reviewed and following examination; no interval change.    ASA Status:  4 .    NPO Status:  > 8 hours    Plan for MAC and Peripheral Nerve Block with Intravenous induction. Maintenance will be TIVA.  Reason for MAC:  Deep or markedly invasive procedure (G8)  PONV prophylaxis:  Ondansetron (or other 5HT-3)       Postoperative Care      Consents  Anesthetic plan, risks, benefits and alternatives discussed with:  Patient..        ANESTHESIA PREOP EVALUATION        PMHx/PSHx/ROS:  PAST MEDICAL HISTORY:   Past Medical History:   Diagnosis Date     Carpal tunnel syndrome      Coronary atherosclerosis of native coronary artery 2006    5 vessel CABG     OBSTRUCTIVE SLEEP APNEA     using CPAP     Osteoarthrosis, unspecified whether generalized or localized, unspecified site     generalized arthritis, particularly in her hands and feet         Other and combined forms of senile cataract 2000    Bilateral     Other and unspecified hyperlipidemia      RESTLESS LEGS SYNDROME      TENOSYNOV HAND/WRIST NEC 6/30/2006     Type II or unspecified type  diabetes mellitus without mention of complication, not stated as uncontrolled 2001    Diabetes mellitus/pt is diet controlled with weight loss     Typical atrial flutter (H) 01/17/2007    ablation 6/7/2017     Unspecified essential hypertension        PAST SURGICAL HISTORY:   Past Surgical History:   Procedure Laterality Date     ANGIOPLASTY       C APPENDECTOMY       C CABG, VEIN, FIVE  12/06    SVG x 4 and LIMA to LAD     C SOTO W/O FACETEC FORAMOT/[facility] 1/2 VRT SEG, LUMBAR  1968     C REMV CATARACT INTRACAP,INSERT LENS      Bilateral     C TOTAL ABDOM HYSTERECTOMY  1995     - fibroids     C VAGOTOMY/PYLOROPLASTY,SELECT  1970     COLONOSCOPY  12/3/2007     COLONOSCOPY  1/17/2014    Procedure: COLONOSCOPY;  Colonoscopy;  Surgeon: Zack Stearns MD;  Location:  GI     CYSTOSCOPY  11/25/09    [facility]     ENDOSCOPY  03/21/2000    Upper GI     HC COLONOSCOPY THRU STOMA, DIAGNOSTIC  10/7/04    poor prep, repeat in 2-3 years     HC COLONOSCOPY W/WO BRUSH/WASH  10/31/07    Repeat-poor quality prep     HC REMOVAL OF OVARY/TUBE(S)      Salpingo-Oophorectomy, Unilateral     HC REVISE MEDIAN N/CARPAL TUNNEL SURG      Carpal tunnel release     HC UGI ENDOSCOPY DIAG W BIOPSY  10/31/07     HC UGI ENDOSCOPY, SIMPLE EXAM  12/3/2007     LAPAROTOMY, LYSIS ADHESIONS, COMBINED N/A 10/24/2017    Procedure: COMBINED LAPAROTOMY, LYSIS ADHESIONS;  REPAIR FOCAL ILEAL SMALL BOWEL PERFERATION, LYSIS OF ADHESIONS.;  Surgeon: Rashard Zafar MD;  Location:  OR     OPEN REDUCTION INTERNAL FIXATION HIP NAILING Left 7/3/2016    Procedure: OPEN REDUCTION INTERNAL FIXATION HIP NAILING;  Surgeon: Lake Schulz MD;  Location:  OR     Coags/Type and Screen  Lab Results   Component Value Date    INR 2.0 01/03/2018     Lab Results   Component Value Date    PT 74.1 05/16/2014     Type and Screen:  Dimitri Hernandez M.D.    1/9/2018  10:52 AM                        .

## 2018-01-09 NOTE — IP AVS SNAPSHOT
MRN:[id number]                      After Visit Summary   1/9/2018    Kathryn Banks    MRN: [id number]           Thank you!     Thank you for choosing [facility] for your care. Our goal is always to provide you with excellent care. Hearing back from our patients is one way we can continue to improve our services. Please take a few minutes to complete the written survey that you may receive in the mail after you visit with us. Thank you!        Patient Information     Date Of Birth          1940        About your hospital stay     You were admitted on:  January 9, 2018 You last received care in the:  Same Day Surgery [facility]    You were discharged on:  January 9, 2018       Who to Call     For medical emergencies, please call 911.  For non-urgent questions about your medical care, please call your primary care provider or clinic, [phone]  For questions related to your surgery, please call your surgery clinic        Attending Provider     Provider Cassie Perry MD Vascular Surgery       Primary Care Provider Office Phone # Fax #    Dheeraj Jj [phone]      After Care Instructions     Diet Instructions       Resume pre-procedure diet            Discharge Instructions       Patient to follow up with appointment in 2 weeks            Dressing       Keep dressing clean and dry.  Dressing / incisional care as instructed by RN and or Surgeon            No lifting        No lifting over 10 lbs and no strenuous physical activity for 2 weeks            Shower       No shower for 24 hours post procedure. May shower Postoperative Day (POD)                  Further instructions from your care team       [facility]  Same-Day Surgery   Adult Discharge Orders & Instructions     For 24 hours after surgery    1. Get plenty of rest.  A responsible adult must stay with you for at least 24 hours after you leave the hospital.  "  2. Do not drive or use heavy equipment.  If you have weakness or tingling, don't drive or use heavy equipment until this feeling goes away.  3. Do not drink alcohol.  4. Avoid strenuous or risky activities.  Ask for help when climbing stairs.   5. You may feel lightheaded.  IF so, sit for a few minutes before standing.  Have someone help you get up.   6. If you have nausea (feel sick to your stomach): Drink only clear liquids such as apple juice, ginger ale, broth or 7-Up.  Rest may also help.  Be sure to drink enough fluids.  Move to a regular diet as you feel able.  7. You may have a slight fever. Call the doctor if your fever is over 100 F (37.7 C) (taken under the tongue) or lasts longer than 24 hours.  8. You may have a dry mouth, a sore throat, muscle aches or trouble sleeping.  These should go away after 24 hours.  9. Do not make important or legal decisions.   Call your doctor for any of the followin.  Signs of infection (fever, growing tenderness at the surgery site, a large amount of drainage or bleeding, severe pain, foul-smelling drainage, redness, swelling).    2. It has been over 8 to 10 hours since surgery and you are still not able to urinate (pass water).    3.  Headache for over 24 hours.    To contact a doctor, call Dr Cardenas's office at [phone] or:        [phone] and ask for the resident on call for vascular surgery (answered 24 hours a day)      Emergency Department:    [facility]: [phone]       (TTY for hearing impaired: [phone])              Pending Results     Date and Time Order Name Status Description    2018 1005 EKG 12-lead, tracing only Preliminary             Admission Information     Date & Time Provider Department Dept. Phone    2018 Cassie Cardenas MD Same Day Surgery [facility] [phone]      Your Vitals Were     Blood Pressure Pulse Temperature Respirations Height Weight    156/59 88 98.6  F (37  C) (Oral) 12 1.626 m (5' 4\") 68.2 kg " (150 lb 5.7 oz)    Pulse Oximetry BMI (Body Mass Index)                92% 25.81 kg/m2          [facility] Information     [facility] gives you secure access to your electronic health record. If you see a primary care provider, you can also send messages to your care team and make appointments. If you have questions, please call your primary care clinic.  If you do not have a primary care provider, please call [phone] and they will assist you.        Care EveryWhere ID     This is your Care EveryWhere ID. This could be used by other organizations to access your [place] medical records  [id number]        Equal Access to Services     [facility] : Tl Garcia, ryan pham, katharine downey, tay santizo . So [facility] [phone].    ATENCIÓN: Si habla español, tiene a medrano disposición servicios gratuitos de asistencia lingüística. Llcarolyn al [phone].    We comply with applicable federal civil rights laws and [place] laws. We do not discriminate on the basis of race, color, national origin, age, disability, sex, sexual orientation, or gender identity.               Review of your medicines      START taking        Dose / Directions    HYDROcodone-acetaminophen 5-325 MG per tablet   Commonly known as:  NORCO   Used for:  ESRD (end stage renal disease) on dialysis (H)        Dose:  1-2 tablet   Take 1-2 tablets by mouth every 4 hours as needed for other (Moderate to Severe Pain)   Quantity:  20 tablet   Refills:  0         CONTINUE these medicines which have NOT CHANGED        Dose / Directions    ACETAMINOPHEN PO        Dose:  650 mg   Take 650 mg by mouth every 4 hours as needed for pain For pain 1-5 out of 10   Refills:  0       calcium acetate 667 MG Caps capsule   Commonly known as:  PHOSLO   Used for:  Chronic kidney disease, unspecified CKD stage        Dose:  667 mg   Take 1 capsule (667 mg) by mouth 3 times daily (with meals)   Quantity:  180 capsule    Refills:  0       diltiazem 240 MG 24 hr capsule   Commonly known as:  CARDIZEM CD   Used for:  Paroxysmal atrial fibrillation (H)        Dose:  240 mg   Take 1 capsule (240 mg) by mouth daily   Quantity:  90 capsule   Refills:  0       famotidine 20 MG tablet   Commonly known as:  PEPCID   Used for:  Gastroesophageal reflux disease without esophagitis        Dose:  20 mg   Take 1 tablet (20 mg) by mouth 2 times daily as needed   Quantity:  60 tablet   Refills:  1       fluticasone 50 MCG/ACT spray   Commonly known as:  FLONASE   Used for:  Nasal congestion        Dose:  1 spray   Spray 1 spray into both nostrils daily   Quantity:  1 Bottle   Refills:  0       hypromellose-dextran Soln ophthalmic solution   Used for:  Dry eyes        Dose:  1 drop   Place 1 drop into both eyes 3 times daily as needed for dry eyes   Refills:  0       ipratropium - albuterol 0.5 mg/2.5 mg/3 mL 0.5-2.5 (3) MG/3ML neb solution   Commonly known as:  DUONEB        Dose:  1 vial   Take 1 vial by nebulization every 6 hours as needed   Refills:  0       LORazepam 0.5 MG tablet   Commonly known as:  ATIVAN   Used for:  Anxiety        Dose:  0.25 mg   Take 0.5 tablets (0.25 mg) by mouth every 8 hours as needed for anxiety   Quantity:  20 tablet   Refills:  0       metoprolol 50 MG tablet   Commonly known as:  LOPRESSOR   Used for:  Benign essential hypertension        Dose:  50 mg   Take 1 tablet (50 mg) by mouth 2 times daily   Quantity:  60 tablet   Refills:  1       nitroGLYcerin 0.4 MG sublingual tablet   Commonly known as:  NITROSTAT   Used for:  Hx of non-ST elevation myocardial infarction (NSTEMI), Atherosclerosis of native coronary artery of native heart without angina pectoris, Postsurgical aortocoronary bypass status        Dose:  0.4 mg   Place 1 tablet (0.4 mg) under the tongue every 5 minutes as needed for chest pain   Quantity:  25 tablet   Refills:  0       order for DME   Used for:  ANABEL (obstructive sleep apnea)         Equipment being ordered: cpap machine, mask, humidifier, tubing and filters   Quantity:  1 Device   Refills:  0       pentoxifylline 400 MG CR tablet   Commonly known as:  TRENtal   Used for:  PAD (peripheral artery disease) (H)        Dose:  400 mg   Take 1 tablet (400 mg) by mouth daily   Quantity:  90 tablet   Refills:  0       pramipexole 0.125 MG tablet   Commonly known as:  MIRAPEX   Used for:  Restless leg syndrome        Dose:  0.125 mg   Take 1 tablet (0.125 mg) by mouth 3 times daily   Quantity:  90 tablet   Refills:  3       pravastatin 40 MG tablet   Commonly known as:  PRAVACHOL   Used for:  Hx of non-ST elevation myocardial infarction (NSTEMI), Atherosclerosis of native coronary artery of native heart without angina pectoris, Type 2 diabetes mellitus with other circulatory complications        Dose:  40 mg   Take 1 tablet (40 mg) by mouth daily   Quantity:  90 tablet   Refills:  3       * RENAL 1 MG Caps        Dose:  1 capsule   Take 1 capsule by mouth daily   Refills:  0       * NEPHROCAPS 1 MG capsule   Used for:  ESRD (end stage renal disease) on dialysis (H)        Dose:  1 capsule   Take 1 capsule by mouth daily   Quantity:  100 capsule   Refills:  1       VITAMIN D (CHOLECALCIFEROL) PO        Dose:  5000 Units   Take 5,000 Units by mouth daily   Refills:  0       * warfarin 2 MG tablet   Commonly known as:  COUMADIN   Used for:  Atrial flutter, unspecified type (H)        1 mg daily for aflut and adjust for desired INR 2.0 to 3.0   Quantity:  30 tablet   Refills:  0       * warfarin 1 MG tablet   Commonly known as:  COUMADIN   Used for:  Atrial flutter, unspecified type (H), Paroxysmal atrial fibrillation (H)        TAKE TWO TABLETS BY MOUTH ONCE DAILY AS DIRECTED BY  COUMADIN  CLINIC   Quantity:  180 tablet   Refills:  0       * Notice:  This list has 4 medication(s) that are the same as other medications prescribed for you. Read the directions carefully, and ask your doctor or other care  provider to review them with you.         Where to get your medicines      Some of these will need a paper prescription and others can be bought over the counter. Ask your nurse if you have questions.     Bring a paper prescription for each of these medications     HYDROcodone-acetaminophen 5-325 MG per tablet                Protect others around you: Learn how to safely use, store and throw away your medicines at www.disposemymeds.org.             Medication List: This is a list of all your medications and when to take them. Check marks below indicate your daily home schedule. Keep this list as a reference.      Medications           Morning Afternoon Evening Bedtime As Needed    ACETAMINOPHEN PO   Take 650 mg by mouth every 4 hours as needed for pain For pain 1-5 out of 10                                calcium acetate 667 MG Caps capsule   Commonly known as:  PHOSLO   Take 1 capsule (667 mg) by mouth 3 times daily (with meals)                                diltiazem 240 MG 24 hr capsule   Commonly known as:  CARDIZEM CD   Take 1 capsule (240 mg) by mouth daily                                famotidine 20 MG tablet   Commonly known as:  PEPCID   Take 1 tablet (20 mg) by mouth 2 times daily as needed                                fluticasone 50 MCG/ACT spray   Commonly known as:  FLONASE   Spray 1 spray into both nostrils daily                                HYDROcodone-acetaminophen 5-325 MG per tablet   Commonly known as:  NORCO   Take 1-2 tablets by mouth every 4 hours as needed for other (Moderate to Severe Pain)                                hypromellose-dextran Soln ophthalmic solution   Place 1 drop into both eyes 3 times daily as needed for dry eyes                                ipratropium - albuterol 0.5 mg/2.5 mg/3 mL 0.5-2.5 (3) MG/3ML neb solution   Commonly known as:  DUONEB   Take 1 vial by nebulization every 6 hours as needed                                LORazepam 0.5 MG tablet   Commonly  known as:  ATIVAN   Take 0.5 tablets (0.25 mg) by mouth every 8 hours as needed for anxiety                                metoprolol 50 MG tablet   Commonly known as:  LOPRESSOR   Take 1 tablet (50 mg) by mouth 2 times daily                                nitroGLYcerin 0.4 MG sublingual tablet   Commonly known as:  NITROSTAT   Place 1 tablet (0.4 mg) under the tongue every 5 minutes as needed for chest pain                                order for DME   Equipment being ordered: cpap machine, mask, humidifier, tubing and filters                                pentoxifylline 400 MG CR tablet   Commonly known as:  TRENtal   Take 1 tablet (400 mg) by mouth daily                                pramipexole 0.125 MG tablet   Commonly known as:  MIRAPEX   Take 1 tablet (0.125 mg) by mouth 3 times daily                                pravastatin 40 MG tablet   Commonly known as:  PRAVACHOL   Take 1 tablet (40 mg) by mouth daily                                * RENAL 1 MG Caps   Take 1 capsule by mouth daily                                * NEPHROCAPS 1 MG capsule   Take 1 capsule by mouth daily                                VITAMIN D (CHOLECALCIFEROL) PO   Take 5,000 Units by mouth daily                                * warfarin 2 MG tablet   Commonly known as:  COUMADIN   1 mg daily for aflut and adjust for desired INR 2.0 to 3.0                                * warfarin 1 MG tablet   Commonly known as:  COUMADIN   TAKE TWO TABLETS BY MOUTH ONCE DAILY AS DIRECTED BY  COUMADIN  CLINIC                                * Notice:  This list has 4 medication(s) that are the same as other medications prescribed for you. Read the directions carefully, and ask your doctor or other care provider to review them with you.

## 2018-01-09 NOTE — DISCHARGE INSTRUCTIONS
[facility]  Same-Day Surgery   Adult Discharge Orders & Instructions     For 24 hours after surgery    1. Get plenty of rest.  A responsible adult must stay with you for at least 24 hours after you leave the hospital.   2. Do not drive or use heavy equipment.  If you have weakness or tingling, don't drive or use heavy equipment until this feeling goes away.  3. Do not drink alcohol.  4. Avoid strenuous or risky activities.  Ask for help when climbing stairs.   5. You may feel lightheaded.  IF so, sit for a few minutes before standing.  Have someone help you get up.   6. If you have nausea (feel sick to your stomach): Drink only clear liquids such as apple juice, ginger ale, broth or 7-Up.  Rest may also help.  Be sure to drink enough fluids.  Move to a regular diet as you feel able.  7. You may have a slight fever. Call the doctor if your fever is over 100 F (37.7 C) (taken under the tongue) or lasts longer than 24 hours.  8. You may have a dry mouth, a sore throat, muscle aches or trouble sleeping.  These should go away after 24 hours.  9. Do not make important or legal decisions.   Call your doctor for any of the followin.  Signs of infection (fever, growing tenderness at the surgery site, a large amount of drainage or bleeding, severe pain, foul-smelling drainage, redness, swelling).    2. It has been over 8 to 10 hours since surgery and you are still not able to urinate (pass water).    3.  Headache for over 24 hours.    To contact a doctor, call Dr Cardenas's office at [phone] or:        [phone] and ask for the resident on call for vascular surgery (answered 24 hours a day)      Emergency Department:    [facility]: [phone]       (TTY for hearing impaired: [phone])

## 2018-01-09 NOTE — ANESTHESIA POSTPROCEDURE EVALUATION
Patient: Kathryn Banks    Procedure(s):  Left Upper Arm Arteriovenous Graft Creation, Anesthesia Block  - Wound Class: I-Clean    Diagnosis:End Stage Renal Disease On Dialysis   Diagnosis Additional Information: No value filed.    Anesthesia Type:  MAC, Peripheral Nerve Block    Note:  Anesthesia Post Evaluation    Patient location during evaluation: PACU  Patient participation: Able to fully participate in evaluation  Level of consciousness: awake  Pain management: adequate  Airway patency: patent  Cardiovascular status: acceptable  Respiratory status: acceptable  Hydration status: acceptable  PONV: none     Anesthetic complications: None          Last vitals:  Vitals:    01/09/18 1100 01/09/18 1330 01/09/18 1345   BP: 168/85 117/56 145/56   Pulse: 88     Resp: 15 10    Temp:      SpO2:            Electronically Signed By: Dimitri Hernandez MD  January 9, 2018  1:58 PM

## 2018-01-09 NOTE — BRIEF OP NOTE
[facility], [place]    Brief Operative Note    Pre-operative diagnosis: End Stage Renal Disease On Dialysis   Post-operative diagnosis ESRD on HD  Procedure: Procedure(s):  Left Upper Arm Arteriovenous Graft Creation, Anesthesia Block  - Wound Class: I-Clean  Surgeon: Surgeon(s) and Role:     * Cassie Cardenas MD - Primary     * Lejeune, Stacey, MD - Assisting  Anesthesia: Combined MAC with Supraclavicular Block   Estimated blood loss: Less than 10 ml  Drains: None  Specimens: * No specimens in log *  Findings:   + radial pulse and thrill on left arm at completion of procedure, 2 large branches ligated off of cephalic vein .  Complications: None.  Implants: None.

## 2018-01-10 ENCOUNTER — TELEPHONE (OUTPATIENT)
Dept: INTERNAL MEDICINE | Facility: CLINIC | Age: 78
End: 2018-01-10

## 2018-01-10 ENCOUNTER — DOCUMENTATION ONLY (OUTPATIENT)
Dept: CARE COORDINATION | Facility: CLINIC | Age: 78
End: 2018-01-10

## 2018-01-10 ENCOUNTER — ANTICOAGULATION THERAPY VISIT (OUTPATIENT)
Dept: ANTICOAGULATION | Facility: CLINIC | Age: 78
End: 2018-01-10
Payer: COMMERCIAL

## 2018-01-10 LAB
INR PPP: 2
INTERPRETATION ECG - MUSE: NORMAL

## 2018-01-10 NOTE — TELEPHONE ENCOUNTER
Reason for Call:  INR    Who is calling?  Home Care: [facility]    Phone number:  [phone]    Fax number:  na    Name of caller: Anisa    INR Value:  2.0    Are there any other concerns:  Yes: Had to hold a dose for minor surgery    Can we leave a detailed message on this number? YES    Phone number patient can be reached at: [phone]      Call taken on 1/10/2018 at 8:28 AM by Zo Cardenas

## 2018-01-10 NOTE — MR AVS SNAPSHOT
Kathryn MICKY Banks   1/10/2018   Anticoagulation Therapy Visit    Description:  77 year old female   Provider:  Dheeraj Jj MD   Department:  Ph Anticoag           INR as of 1/10/2018     Today's INR 2.0      Anticoagulation Summary as of 1/10/2018     INR goal 2.0-3.0   Today's INR 2.0   Full instructions 3 mg on Sun; 2 mg all other days   Next INR check 1/17/2018    Indications   Atrial fibrillation (H) (Resolved) [I48.91]  Long-term (current) use of anticoagulants [Z79.01] (Resolved) [Z79.01]         Contact Numbers     Clinic Number:         January 2018 Details    Sun Mon Tue Wed Thu Fri Sat      1               2               3               4               5               6                 7               8               9               10      2 mg   See details      11      2 mg         12      2 mg         13      2 mg           14      3 mg         15      2 mg         16      2 mg         17            18               19               20                 21               22               23               24               25               26               27                 28               29               30               31                   Date Details   01/10 This INR check       Date of next INR:  1/17/2018         How to take your warfarin dose     To take:  2 mg Take 2 of the 1 mg tablets.    To take:  3 mg Take 3 of the 1 mg tablets.

## 2018-01-10 NOTE — OP NOTE
DATE OF OPERATION:  01/09/2018      LOCATION:  [facility] main operating room.      PREOPERATIVE DIAGNOSIS:  End-stage renal failure.      POSTOPERATIVE DIAGNOSIS:  End-stage renal failure.      PROCEDURE:  Left arm brachiocephalic fistula creation.      SURGEON:  Cassie Cardenas MD      1ST ASSISTANT:  Stacey Lejeune, MD      ANESTHESIA:  Local anesthesia with regional block and IV sedation performed by the anesthesia team.      FINDINGS:  Good caliber healthy-appearing incompressible cephalic vein at the antecubital fossa and all the way up the arm with 2 small parasitic branches in the mid upper arm.  Healthy-appearing brachial artery.  Post-fistula creation excellent thrill in the fistula.  Both parasitic branches ligated with 3-0 silk suture ligature.      ESTIMATED BLOOD LOSS:  Less than 30 mL.      BRIEF CLINICAL HISTORY:  Ms. Kathryn Banks is a 77-year-old woman with end-stage renal failure who was being worked up for hemodialysis when she presented with acute peritonitis and required emergency laparotomy.  Postoperative she went into complete renal failure and required a PermCath for access.  She then presented to us for more definitive hemodialysis access and we will now place a fistula.  A vein mapping appeared to show good cephalic vein in the left arm.      DESCRIPTION OF PROCEDURE:  The patient was prepped and draped for access to her left arm after regional block and ultrasound mapping of the vein.  The vein and artery appeared to be in its nice proximity immediately below the elbow crease and a transverse incision was made just beyond the elbow crease and extended down to subcutaneous tissue to expose the healthiest cephalic vein.  This was circumferentially dissected for a segment of approximately 4 cm.  The biceps aponeurosis was then divided and the brachial artery was cleared of the adjacent veins for a segment of approximately 2 cm.  The patient was given 2000 units of  heparin IV and the artery clamped and opened with a 6 mm opening.  The vein was ligated and transected and spatulated.  End-to-side anastomosis was created with 6-0 Prolene suture.  Prior to completing our anastomosis, we back flushed the vessels and we completed anastomosis and restored flow.  We irrigated the wound, confirmed a good thrill in the fistula and palpable radial pulse.  We now made 2 small counterincisions where we had marked out the 2 parasitic branches on the upper arm and extended these down to subcutaneous tissue to identify these branches.  These were ligated with 3-0 silk tie.  We closed these small counterincisions with 4-0 Vicryl subcuticular suture.  We closed the anastomosis incision with 2-0 Vicryl subcutaneous suture and 4-0 subcuticular with Dermabond over top.  The procedure was well tolerated.         KRISHNA TOSCANO MD             D: 2018 13:21   T: 2018 20:14   MT:       Name:     MARI HERNANDEZ   MRN:      [id number]        Account:        [id number]   :      1940           Procedure Date: 2018      Document: [id number]       cc: STACEY LEJEUNE MD

## 2018-01-10 NOTE — PROGRESS NOTES
ANTICOAGULATION FOLLOW-UP CLINIC VISIT    Patient Name:  Kathryn Banks  Date:  1/10/2018  Contact Type:  Telephone/ Anisa  nurse    SUBJECTIVE:     Patient Findings     Positives Intentional hold of therapy (held on Monday for procedure on Tuesday 1/9)    Comments Reason for Call:  INR     Who is calling?  [facility]      Phone number:  [phone]     Fax number:  na     Name of caller: Anisa     INR Value:  2.0     Are there any other concerns:  Yes: Had to hold a dose for minor surgery     Can we leave a detailed message on this number? YES     Phone number patient can be reached at: [phone]        Call taken on 1/10/2018 at 8:28 AM by Zo Cardenas              OBJECTIVE    INR   Date Value Ref Range Status   01/10/2018 2.0  Final       ASSESSMENT / PLAN  INR assessment THER    Recheck INR In: 1 WEEK    INR Location Homecare INR      Anticoagulation Summary as of 1/10/2018     INR goal 2.0-3.0   Today's INR 2.0   Maintenance plan 3 mg (1 mg x 3) on Sun; 2 mg (1 mg x 2) all other days   Full instructions 3 mg on Sun; 2 mg all other days   Weekly total 15 mg   No change documented Zo Davis, RN   Plan last modified Nicki Calvillo, RN (12/26/2017)   Next INR check 1/17/2018   Target end date     Indications   Atrial fibrillation (H) (Resolved) [I48.91]  Long-term (current) use of anticoagulants [Z79.01] (Resolved) [Z79.01]         Anticoagulation Episode Summary     INR check location     Preferred lab     Send INR reminders to [facility] POOL    Comments 1 mg, 2.5 mg, and 3 mg tabs (as of 11/27, per  nurse)        Anticoagulation Care Providers     Provider Role Specialty Phone number    Dheeraj Jj MD [facility] Internal Medicine [phone]            See the Encounter Report to view Anticoagulation Flowsheet and Dosing Calendar (Go to Encounters tab in chart review, and find the Anticoagulation Therapy Visit)    Dosage adjustment made based on physician directed care  plan.        Zo Davis RN

## 2018-01-10 NOTE — PROGRESS NOTES
[facility] now requests orders and shares plan of care/discharge summaries for some patients through [facility].  Please REPLY TO THIS MESSAGE in order to give authorization for orders when needed.  This is considered a verbal order, you will still receive a faxed copy of orders for signature.  Thank you for your assistance in improving collaboration for our patients.    ORDER    Pt had dialysis graft placed in L upper arm yesterday, 3 incisions present, closed with glue, no s/s of infection or problems found at visit today    Wound care: leave open to air, assess for s/s of infection/complications.    Are you ok with orders?    Thanks,   Anisa Licea RN    [email or web address]

## 2018-01-12 ENCOUNTER — DOCUMENTATION ONLY (OUTPATIENT)
Dept: CARE COORDINATION | Facility: CLINIC | Age: 78
End: 2018-01-12

## 2018-01-12 NOTE — PROGRESS NOTES
[facility] now requests orders and shares plan of care/discharge summaries for some patients through [employer].  Please REPLY TO THIS MESSAGE in order to give authorization for orders when needed.  This is considered a verbal order, you will still receive a faxed copy of orders for signature.  Thank you for your assistance in improving collaboration for our patients.    Pt seen for home care visit today.  Wt is up 4lbs in 2 days, edema 1+pittng in bilat LEs, 2+ in R hand. pt is going to dialysis today, so it should bring wt down.  Pt has a fever of 99.9, denies any pain, resp symptoms, no s/s of infection in wounds or incisions noted. Pt states she feels fine. Again, will be assessed at dialysis again today.  Education given on s/s of infection to monitor for, to check temp regularly and to call is fever persists or goes up.  Educated again on low na diet and fluid restrictions.    Let me know if you have any other recommendations.    Thanks,   Anisa Licea RN    [email or web address]

## 2018-01-17 ENCOUNTER — ANTICOAGULATION THERAPY VISIT (OUTPATIENT)
Dept: ANTICOAGULATION | Facility: CLINIC | Age: 78
End: 2018-01-17
Payer: COMMERCIAL

## 2018-01-17 ENCOUNTER — DOCUMENTATION ONLY (OUTPATIENT)
Dept: CARE COORDINATION | Facility: CLINIC | Age: 78
End: 2018-01-17

## 2018-01-17 ENCOUNTER — TELEPHONE (OUTPATIENT)
Dept: INTERNAL MEDICINE | Facility: CLINIC | Age: 78
End: 2018-01-17

## 2018-01-17 LAB — INR PPP: 1.5

## 2018-01-17 NOTE — TELEPHONE ENCOUNTER
Reason for Call:  INR    Who is calling?  Home Care: [facility]    Phone number:  [phone]    Fax number:  n/a    Name of caller: Anisa    INR Value:  1.5    Are there any other concerns:  Yes: She missed Sunday dose of coumadin. Anisa is waiting in home for a call back for medication set up.    Can we leave a detailed message on this number? YES    Phone number patient can be reached at: Other phone number:  [phone]      Call taken on 1/17/2018 at 8:39 AM by Judson Erazo

## 2018-01-17 NOTE — PROGRESS NOTES
ANTICOAGULATION FOLLOW-UP CLINIC VISIT    Patient Name:  Kathryn Banks  Date:  1/17/2018  Contact Type:  Telephone/ Anisa. HC nurse    SUBJECTIVE:     Patient Findings     Positives Change in medications (started Norco (contains Tylenol)- takes rarely, per Anisa.), Missed doses (missed Sundays dose)    Comments Reason for Call:  INR     Who is calling?  Home Care: [facility]     Phone number:  [phone]     Fax number:  n/a     Name of caller: Anisa     INR Value:  1.5     Are there any other concerns:  Yes: She missed Sunday dose of coumadin. Anisa is waiting in home for a call back for medication set up.     Can we leave a detailed message on this number? YES     Phone number patient can be reached at: Other phone number:  [phone]        Call taken on 1/17/2018 at 8:39 AM by Judson Erazo           OBJECTIVE    INR   Date Value Ref Range Status   01/17/2018 1.5  Final       ASSESSMENT / PLAN  INR assessment SUB    Recheck INR In: 1 WEEK    INR Location Homecare INR      Anticoagulation Summary as of 1/17/2018     INR goal 2.0-3.0   Today's INR 1.5!   Maintenance plan 3 mg (1 mg x 3) on Sun; 2 mg (1 mg x 2) all other days   Full instructions 1/17: 3 mg; Otherwise 3 mg on Sun; 2 mg all other days   Weekly total 15 mg   Plan last modified Nicki Calvillo, RN (12/26/2017)   Next INR check 1/24/2018   Target end date     Indications   Atrial fibrillation (H) (Resolved) [I48.91]  Long-term (current) use of anticoagulants [Z79.01] (Resolved) [Z79.01]         Anticoagulation Episode Summary     INR check location     Preferred lab     Send INR reminders to [facility] POOL    Comments 1 mg, 2.5 mg, and 3 mg tabs (as of 11/27, per HC nurse)        Anticoagulation Care Providers     Provider Role Specialty Phone number    Dheeraj Jj MD [facility] Internal Medicine [phone]            See the Encounter Report to view Anticoagulation Flowsheet and Dosing Calendar (Go to Encounters tab in chart  review, and find the Anticoagulation Therapy Visit)    Dosage adjustment made based on physician directed care plan.      Zo Davis RN

## 2018-01-17 NOTE — MR AVS SNAPSHOT
Kathryn MICKY Banks   1/17/2018   Anticoagulation Therapy Visit    Description:  77 year old female   Provider:  Dheeraj Jj MD   Department:  Ph Anticoag           INR as of 1/17/2018     Today's INR 1.5!      Anticoagulation Summary as of 1/17/2018     INR goal 2.0-3.0   Today's INR 1.5!   Full instructions 1/17: 3 mg; Otherwise 3 mg on Sun; 2 mg all other days   Next INR check 1/24/2018    Indications   Atrial fibrillation (H) (Resolved) [I48.91]  Long-term (current) use of anticoagulants [Z79.01] (Resolved) [Z79.01]         Contact Numbers     Clinic Number:         January 2018 Details    Sun Mon Tue Wed Thu Fri Sat      1               2               3               4               5               6                 7               8               9               10               11               12               13                 14               15               16               17      3 mg   See details      18      2 mg         19      2 mg         20      2 mg           21      3 mg         22      2 mg         23      2 mg         24            25               26               27                 28               29               30               31                   Date Details   01/17 This INR check       Date of next INR:  1/24/2018         How to take your warfarin dose     To take:  2 mg Take 2 of the 1 mg tablets.    To take:  3 mg Take 3 of the 1 mg tablets.

## 2018-01-17 NOTE — PROGRESS NOTES
[facility] now requests orders and shares plan of care/discharge summaries for some patients through [employer].  Please REPLY TO THIS MESSAGE in order to give authorization for orders when needed.  This is considered a verbal order, you will still receive a faxed copy of orders for signature.  Thank you for your assistance in improving collaboration for our patients.    Pt seen for home care visit today, traumatic wound on LLE is covered in slough tissue, no drainage noted presently, but is surrounded by redness and is painful for pt, especially with touch and cleaning.  Pt is afebrile currently.  Current wound orders are to cleanse, apply adaptic, cover with gauze or abd, wrap with kerlix, secure with tape.      Would you recommend a change in wound care orders?    I will see if [facility] has availability to see pt soon as well.    Please let me know if you have any other recommendations.    Also severe warning for duplicate therapy warning noted between norco and tylenol, both are PRN and rarely used.  Please let me know if any changes need to be made.    Thanks,     Anisa Licea RN    [email or web address]

## 2018-01-17 NOTE — TELEPHONE ENCOUNTER
Patient unable to wait she had to leave for dialysis.  Coumadin nurse unable to take call, Lg did not have coverage and [place] did not start until afternoon.    Please call the nurse back so they can set up medications later if any changes are required or if they can remain the same.  Thank you,  Agatha ACOSTA

## 2018-01-22 ENCOUNTER — DOCUMENTATION ONLY (OUTPATIENT)
Dept: CARE COORDINATION | Facility: CLINIC | Age: 78
End: 2018-01-22

## 2018-01-22 NOTE — PROGRESS NOTES
[facility] utilizes an encounter to take the place of a direct phone call to your office. Please take a moment to review the below request. Your reply to this encounter will act as a verbal OK of orders if requested below. Thank you.    ORDER recommendation from [facility] nurse  Pt has an increase in the number and severity of LE venous stasis ulcers on her left lower leg and some have slough.  LE edema remains well controlled and though there is erythema on bilateral LE's there was no increased in warmth to those areas at this time.  Right LE has only 2 small open clean venous ulcers.   Recommend flexible wound care order to be directed by home care nursing  and [facility] nurse as follows.  Use of a silver alginate dressing on wounds that have slough present, wound without slough will have same cares as we are currently doing.    Order will read, cleanse both lower legs and wounds with soap and water, ns or a no rinse dermal cleanser and dry, apply Adaptic non adherent contact dressing cut to fit size and shape of any area noted for drainage.  Apply a cut to fit piece of Silver Alginate on top of the Adaptic over any wounds that contain slough or eschar, Cover all wounds with an absorptive pad as needed for drainage control, secure with roll gauze and tape.  Change dressings daily and prn for drainage control.   Please reply via Epic if this is acceptable.  Thank You.    Suhail Torrez RN [email or web address]

## 2018-01-22 NOTE — PROGRESS NOTES
[facility] now requests orders and shares plan of care/discharge summaries for some patients through [facility].  Please REPLY TO THIS MESSAGE in order to give authorization for orders when needed.  This is considered a verbal order, you will still receive a faxed copy of orders for signature.  Thank you for your assistance in improving collaboration for our patients.    ORDER 2w9,3prns    MD SUMMARY/PLAN OF CARE  SITUATION  Recertification to [facility] for  services under [facility]. Lives in single family home with spouse and has other friends and relatives that stay in their home from time to time.  Spouse is primary caregiver, but does recieve assistance from others in the home for rides to appointments and some meal prepping. Continues to go to dialysis 3x/week.  Wts have been up and down over the past 2 months, usually able to maintain or get wt to come down with dialysis.  BG has been stable over past 2 months, no concerns.  Pt had dialysis graft placed in L upper arm on 1/9, incisions have remained closed with wound edges well approximated, no s/s of infection, healing nicely. Continues to have stasis ulcers on bilat LEs that come and go and drainage changes frequently, currently RLE has 1 open area with pink wound bed and small amount of serous drainage.  LLE has stasis ulcer with slough tissue presently in wound bed, slight redness surrounding wound, scant amount of serous drainage noted.  Has traumatic wound on LLE as well that she obtained by dropping a plate on her leg, wound bed is covered with slough tissue, surrounding skin is reddended, scant amount of serous drainage noted.  New wound care orders were obtained by [facility] last week to start applying silver alginate to wounds with slough tissue present. Educated to see MD if any increase in redness or pain in wounds.  Edema is trace today in bilat LEs and R hand, this has varied greatly over past 2 months depending on wt gain and dialysis schedule.   Has maintained a good appetite over past 2 months, although is not always adhering to a ADA, low na, renal diet, but does try.Also on fluid restrictions, which she tries to follow closely. Has not had any falls over past 2 months, she uses an electric w/c for most mobility, but does ambulate short distances with walker and assist of 1.  Meds have been set up by SN over the past 2 months, recently pts MD2 was taken back, [facility], pt will start learning med set up to be able to continue once ready for d/c, pt has been compliant with medication over the past 2 months.  Denies any problems with bowel or bladder, reports having a bm almost daily, but does have some incontinence with bowels a few times a week d/t looser stools.  Pt has low urine output due to dialysis, but no other problems found. Mood is appropriate, no s/s of depression noted or reported. BP has flucuated greatly over past 2 months, a lot of high BPs were noted, recently BP has stabilized.  VS today were /62, HR 62, RR 18, T 97.7, O2 sats 92% on ra. Lungs cta, denies any coughing, only gets SOB with moderate exertion, no changes to this.  BACKGROUND  77 yo female  hospitalized at [facility] for abdominal pain and SBO from 10/22/17 to 11/1/17 and was DC to  [facility]  until 11/21/17.  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 Recently has stasis ulcer, traumatic wound and new dialysis graft placed on 1/9.  ANALYSIS  At risk for hospitalization d/t wounds, CHF, CKD4-5, DM2, warfarin dosing  RECOMMENDATION  Pt to continue SN services for 2x/wk visits as described above

## 2018-01-24 ENCOUNTER — TELEPHONE (OUTPATIENT)
Dept: FAMILY MEDICINE | Facility: CLINIC | Age: 78
End: 2018-01-24
Payer: COMMERCIAL

## 2018-01-24 ENCOUNTER — TELEPHONE (OUTPATIENT)
Dept: INTERNAL MEDICINE | Facility: CLINIC | Age: 78
End: 2018-01-24

## 2018-01-24 ENCOUNTER — ANTICOAGULATION THERAPY VISIT (OUTPATIENT)
Dept: ANTICOAGULATION | Facility: CLINIC | Age: 78
End: 2018-01-24
Payer: COMMERCIAL

## 2018-01-24 DIAGNOSIS — Z53.9 DIAGNOSIS NOT YET DEFINED: Primary | ICD-10-CM

## 2018-01-24 DIAGNOSIS — Z48.812 ENCNTR FOR SURGICAL AFTCR FOLLOWING SURGERY ON THE CIRC SYS: Primary | ICD-10-CM

## 2018-01-24 LAB — INR PPP: 1.4

## 2018-01-24 PROCEDURE — G0179 MD RECERTIFICATION HHA PT: HCPCS | Performed by: INTERNAL MEDICINE

## 2018-01-24 PROCEDURE — 99207 ZZC NO BILLABLE SERVICE THIS VISIT: CPT | Performed by: INTERNAL MEDICINE

## 2018-01-24 NOTE — TELEPHONE ENCOUNTER
Reason for Call:  INR    Who is calling?  Home Care: [facility]    Phone number:  [phone]    Fax number:  n/a    Name of caller: Marsha     INR Value:  1.4    Are there any other concerns:  No- is waiting in home for a call back for medication set up    Can we leave a detailed message on this number? YES    Phone number patient can be reached at: Other phone number:  [phone]      Call taken on 1/24/2018 at 8:12 AM by Judson Erazo

## 2018-01-24 NOTE — MR AVS SNAPSHOT
Kathryn MICKY Banks   1/24/2018   Anticoagulation Therapy Visit    Description:  77 year old female   Provider:  Dheeraj Jj MD   Department:  Ph Anticoag           INR as of 1/24/2018     Today's INR 1.4!      Anticoagulation Summary as of 1/24/2018     INR goal 2.0-3.0   Today's INR 1.4!   Full instructions 1/24: 3 mg; 1/25: 3 mg; 1/27: 3 mg; Otherwise 3 mg on Sun; 2 mg all other days   Next INR check 1/29/2018    Indications   Atrial fibrillation (H) (Resolved) [I48.91]  Long-term (current) use of anticoagulants [Z79.01] (Resolved) [Z79.01]         Contact Numbers     Clinic Number:         January 2018 Details    Sun Mon Tue Wed Thu Fri Sat      1               2               3               4               5               6                 7               8               9               10               11               12               13                 14               15               16               17               18               19               20                 21               22               23               24      3 mg   See details      25      3 mg         26      2 mg         27      3 mg           28      3 mg         29            30               31                   Date Details   01/24 This INR check       Date of next INR:  1/29/2018         How to take your warfarin dose     To take:  2 mg Take 2 of the 1 mg tablets.    To take:  3 mg Take 3 of the 1 mg tablets.

## 2018-01-24 NOTE — TELEPHONE ENCOUNTER
Forms received from  Home Care on 1/24/18.  Forms with RN to review medications.  Orders pended for provider to sign.    Forms given to Christina for PCP signature.

## 2018-01-24 NOTE — PROGRESS NOTES
ANTICOAGULATION FOLLOW-UP CLINIC VISIT    Patient Name:  Kathryn Banks  Date:  1/24/2018  Contact Type:  Telephone/ Marsha  nurse    SUBJECTIVE:     Patient Findings     Positives Missed doses (missed Tues dose)    Comments Reason for Call:  INR     Who is calling?  Home Care: [facility]     Phone number:  [phone]     Fax number:  n/a     Name of caller: Marsha      INR Value:  1.4     Are there any other concerns:  No- is waiting in home for a call back for medication set up     Can we leave a detailed message on this number? YES     Phone number patient can be reached at: Other phone number:  [phone]        Call taken on 1/24/2018 at 8:12 AM by Judson Erazo              OBJECTIVE    INR   Date Value Ref Range Status   01/24/2018 1.4  Final       ASSESSMENT / PLAN  INR assessment SUB    Recheck INR In: 5 DAYS    INR Location Homecare INR      Anticoagulation Summary as of 1/24/2018     INR goal 2.0-3.0   Today's INR 1.4!   Maintenance plan 3 mg (1 mg x 3) on Sun; 2 mg (1 mg x 2) all other days   Full instructions 1/24: 3 mg; 1/25: 3 mg; 1/27: 3 mg; Otherwise 3 mg on Sun; 2 mg all other days   Weekly total 15 mg   Plan last modified Nicki Clavillo, RN (12/26/2017)   Next INR check 1/29/2018   Target end date     Indications   Atrial fibrillation (H) (Resolved) [I48.91]  Long-term (current) use of anticoagulants [Z79.01] (Resolved) [Z79.01]         Anticoagulation Episode Summary     INR check location     Preferred lab     Send INR reminders to [facility] POOL    Comments 1 mg, 2.5 mg, and 3 mg tabs (as of 11/27, per HC nurse)        Anticoagulation Care Providers     Provider Role Specialty Phone number    Dheeraj Jj MD [facility] Internal Medicine [phone]            See the Encounter Report to view Anticoagulation Flowsheet and Dosing Calendar (Go to Encounters tab in chart review, and find the Anticoagulation Therapy Visit)    Dosage adjustment made based on physician directed care  plan.        Zo Davis RN

## 2018-01-25 ENCOUNTER — OFFICE VISIT (OUTPATIENT)
Dept: TRANSPLANT | Facility: CLINIC | Age: 78
End: 2018-01-25
Attending: CLINICAL NURSE SPECIALIST
Payer: MEDICARE

## 2018-01-25 VITALS
WEIGHT: 151.1 LBS | BODY MASS INDEX: 25.94 KG/M2 | HEART RATE: 82 BPM | DIASTOLIC BLOOD PRESSURE: 70 MMHG | SYSTOLIC BLOOD PRESSURE: 152 MMHG | OXYGEN SATURATION: 97 %

## 2018-01-25 DIAGNOSIS — Z09 FOLLOW-UP EXAMINATION AFTER VASCULAR SURGERY: ICD-10-CM

## 2018-01-25 DIAGNOSIS — Z48.89 ENCOUNTER FOR POST SURGICAL WOUND CHECK: Primary | ICD-10-CM

## 2018-01-25 PROCEDURE — G0463 HOSPITAL OUTPT CLINIC VISIT: HCPCS | Mod: ZF

## 2018-01-25 ASSESSMENT — PAIN SCALES - GENERAL: PAINLEVEL: NO PAIN (0)

## 2018-01-25 NOTE — LETTER
1/25/2018      RE: Kathryn Banks  [id number] [facility] [address]       Dialysis Access Service   Progress Note    S:  Ms. Banks is being seen today for surgical followup of her dialysis access.  She reports no issues with the wound, and no steal syndrome of the distal extremity. C/O a small fluid collection around AC fossa crease incision area, denies pain nor warm to touch in that area. Denies pain nor swelling in left arm, tingling/numbness or a change of color/temperature in left arm. S/P Left upper arm brachiocephalic AV fistula creation on 1/9/2018.    O:  Pulse:  [82] 82  BP: (152)/(70) 152/70  SpO2:  [97 %] 97 %  GENERAL: no distress  Circulation:   Radial pulse 3+  Ulnar pulse  3+   Capillary refill:  capillary refill < 3 sec    Sensory exam:   arm: Normal   [x]           Abnormal   []          Comment:    hand: Normal   [x]           Abnormal   []          Comment:   Motor exam:   arm: Normal   [x]           Abnormal   []          Comment:    hand: Normal   [x]           Abnormal   []          Comment:    Access: L upper extremity wound(s) healed. non-tender. Capillary refill < 3 sec, ++thrill and bruit via hand held doppler present.  A Small fluid collection around left AC fossa crease incision area, without S/S of infection. No edema in left arm, without apparent infection.    Assessment & Plan: Ms. Emerson dialysis access has matured well at this time point.    1. F/U with Dr. Cardenas in 4 weeks  2. Continue no blood draw and BP from left arm  3. May start hammer curl exercise in left arm with a squeeze ball. 15 minutes a time, 3-4 times a day as tolerated.  4. Check thrill from LUE AV fistula twice a day    We would like to see the patient back in the clinic in 4 weeks time to assess progress. The patient was counselled to contact our nurse coordinator, RHODA Kurtz (Sum), RODRIGO at [phone] with any questions or concerns.  Thank you for the opportunity to participate in Ms. Emerson  care.    TT: 15 min  CT: 15 min    Sharon (Oren) Willie DUONG, CNS  Dialysis access program   [facility]  Pager # [phone]

## 2018-01-25 NOTE — LETTER
1/25/2018       RE: Kathryn Banks  [address] [facility] [address]     Dear Colleague,    Thank you for referring your patient, Kathryn Banks, to the [facility]ID ORGAN TRANSPLANT at [facility]. Please see a copy of my visit note below.    Dialysis Access Service   Progress Note    S:  Ms. Banks is being seen today for surgical followup of her dialysis access.  She reports no issues with the wound, and no steal syndrome of the distal extremity. C/O a small fluid collection around AC fossa crease incision area, denies pain nor warm to touch in that area. Denies pain nor swelling in left arm, tingling/numbness or a change of color/temperature in left arm. S/P Left upper arm brachiocephalic AV fistula creation on 1/9/2018.    O:  Pulse:  [82] 82  BP: (152)/(70) 152/70  SpO2:  [97 %] 97 %  GENERAL: no distress  Circulation:   Radial pulse 3+  Ulnar pulse  3+   Capillary refill:  capillary refill < 3 sec    Sensory exam:   arm: Normal   [x]           Abnormal   []          Comment:    hand: Normal   [x]           Abnormal   []          Comment:   Motor exam:   arm: Normal   [x]           Abnormal   []          Comment:    hand: Normal   [x]           Abnormal   []          Comment:    Access: L upper extremity wound(s) healed. non-tender. Capillary refill < 3 sec, ++thrill and bruit via hand held doppler present.  A Small fluid collection around left AC fossa crease incision area, without S/S of infection. No edema in left arm, without apparent infection.    Assessment & Plan: Ms. Banks's dialysis access has matured well at this time point.    1. F/U with Dr. Cardenas in 4 weeks  2. Continue no blood draw and BP from left arm  3. May start hammer curl exercise in left arm with a squeeze ball. 15 minutes a time, 3-4 times a day as tolerated.  4. Check thrill from LUE AV fistula twice a day    We would like to see the patient back in the clinic in 4 weeks time to assess  progress. The patient was counselled to contact our nurse coordinator, RHODA Kurtz CNS (Sum) at [phone] with any questions or concerns.  Thank you for the opportunity to participate in Ms. Banks's care.    TT: 15 min  CT: 15 min    RODRIGO Taveras (Sum)  Dialysis access program   [facility]  Pager # [phone]    Again, thank you for allowing me to participate in the care of your patient.      Sincerely,    RHODA Coronado

## 2018-01-25 NOTE — MR AVS SNAPSHOT
After Visit Summary   1/25/2018    Kathryn Banks    MRN: [id number]           Patient Information     Date Of Birth          1940        Visit Information        Provider Department      1/25/2018 1:30 PM Sharon Tapia APRN CNS [facility]         Follow-ups after your visit        Your next 10 appointments already scheduled     Mar 01, 2018  2:45 PM CST   (Arrive by 2:30 PM)   Return Vascular Visit with Cassie Cardenas MD   [facility] ([facility])    [address] [place] [address]  3rd [facility] [address] [phone]              Who to contact     If you have questions or need follow up information about today's clinic visit or your schedule please contact [facility] directly at [phone].  Normal or non-critical lab and imaging results will be communicated to you by SportsPursuithart, letter or phone within 4 business days after the clinic has received the results. If you do not hear from us within 7 days, please contact the clinic through [employer] or phone. If you have a critical or abnormal lab result, we will notify you by phone as soon as possible.  Submit refill requests through [email or web address] or call your pharmacy and they will forward the refill request to us. Please allow 3 business days for your refill to be completed.          Additional Information About Your Visit        MyChart Information     [email or web address] gives you secure access to your electronic health record. If you see a primary care provider, you can also send messages to your care team and make appointments. If you have questions, please call your primary care clinic.  If you do not have a primary care provider, please call [phone] and they will assist you.        Care EveryWhere ID     This is your Care EveryWhere ID. This could be used by other organizations to access your [place] medical records  [id number]        Your Vitals Were     Pulse Pulse  Oximetry BMI (Body Mass Index)             82 97% 25.94 kg/m2          Blood Pressure from Last 3 Encounters:   01/25/18 152/70   01/09/18 176/79   12/19/17 142/72    Weight from Last 3 Encounters:   01/25/18 68.5 kg (151 lb 1.6 oz)   01/09/18 68.2 kg (150 lb 5.7 oz)   12/19/17 64.9 kg (143 lb 1.6 oz)              Today, you had the following     No orders found for display       Primary Care Provider Office Phone # Fax #    Dheeraj Jj [phone]       [facility] [address] [place] [address]        Equal Access to Services     DEVONTE BLAND : Hadii pascual Garcia, wacatarinoda karrieadaha, qaybta kaalmada adeюлияyada, tay murillo. So [facility] [phone].    ATENCIÓN: Si habla español, tiene a medrano disposición servicios gratuitos de asistencia lingüística. Llame al [phone].    We comply with applicable federal civil rights laws and [place] laws. We do not discriminate on the basis of race, color, national origin, age, disability, sex, sexual orientation, or gender identity.            Thank you!     Thank you for choosing [facility]  for your care. Our goal is always to provide you with excellent care. Hearing back from our patients is one way we can continue to improve our services. Please take a few minutes to complete the written survey that you may receive in the mail after your visit with us. Thank you!             Your Updated Medication List - Protect others around you: Learn how to safely use, store and throw away your medicines at www.disposemymeds.org.          This list is accurate as of 1/25/18  2:31 PM.  Always use your most recent med list.                   Brand Name Dispense Instructions for use Diagnosis    ACETAMINOPHEN PO      Take 650 mg by mouth every 4 hours as needed for pain For pain 1-5 out of 10        calcium acetate 667 MG Caps capsule    PHOSLO    180 capsule    Take 1 capsule (667 mg) by  mouth 3 times daily (with meals)    Chronic kidney disease, unspecified CKD stage       diltiazem 240 MG 24 hr capsule    CARDIZEM CD    90 capsule    Take 1 capsule (240 mg) by mouth daily    Paroxysmal atrial fibrillation (H)       fluticasone 50 MCG/ACT spray    FLONASE    1 Bottle    Spray 1 spray into both nostrils daily    Nasal congestion       HYDROcodone-acetaminophen 5-325 MG per tablet    NORCO    20 tablet    Take 1-2 tablets by mouth every 4 hours as needed for other (Moderate to Severe Pain)    ESRD (end stage renal disease) on dialysis (H)       hypromellose-dextran Soln ophthalmic solution      Place 1 drop into both eyes 3 times daily as needed for dry eyes    Dry eyes       ipratropium - albuterol 0.5 mg/2.5 mg/3 mL 0.5-2.5 (3) MG/3ML neb solution    DUONEB     Take 1 vial by nebulization every 6 hours as needed        LORazepam 0.5 MG tablet    ATIVAN    20 tablet    Take 0.5 tablets (0.25 mg) by mouth every 8 hours as needed for anxiety    Anxiety       metoprolol tartrate 50 MG tablet    LOPRESSOR    60 tablet    Take 1 tablet (50 mg) by mouth 2 times daily    Benign essential hypertension       nitroGLYcerin 0.4 MG sublingual tablet    NITROSTAT    25 tablet    Place 1 tablet (0.4 mg) under the tongue every 5 minutes as needed for chest pain    Hx of non-ST elevation myocardial infarction (NSTEMI), Atherosclerosis of native coronary artery of native heart without angina pectoris, Postsurgical aortocoronary bypass status       order for DME     1 Device    Equipment being ordered: cpap machine, mask, humidifier, tubing and filters    ANABEL (obstructive sleep apnea)       pentoxifylline 400 MG CR tablet    TRENtal    90 tablet    Take 1 tablet (400 mg) by mouth daily    PAD (peripheral artery disease) (H)       pramipexole 0.125 MG tablet    MIRAPEX    90 tablet    Take 1 tablet (0.125 mg) by mouth 3 times daily    Restless leg syndrome       pravastatin 40 MG tablet    PRAVACHOL    90 tablet     Take 1 tablet (40 mg) by mouth daily    Hx of non-ST elevation myocardial infarction (NSTEMI), Atherosclerosis of native coronary artery of native heart without angina pectoris, Type 2 diabetes mellitus with other circulatory complications       * RENAL 1 MG Caps      Take 1 capsule by mouth daily        * NEPHROCAPS 1 MG capsule     100 capsule    Take 1 capsule by mouth daily    ESRD (end stage renal disease) on dialysis (H)       VITAMIN D (CHOLECALCIFEROL) PO      Take 5,000 Units by mouth daily        * warfarin 2 MG tablet    COUMADIN    30 tablet    1 mg daily for aflut and adjust for desired INR 2.0 to 3.0    Atrial flutter, unspecified type (H)       * warfarin 1 MG tablet    COUMADIN    180 tablet    TAKE TWO TABLETS BY MOUTH ONCE DAILY AS DIRECTED BY  COUMADIN  CLINIC    Atrial flutter, unspecified type (H), Paroxysmal atrial fibrillation (H)       * Notice:  This list has 4 medication(s) that are the same as other medications prescribed for you. Read the directions carefully, and ask your doctor or other care provider to review them with you.

## 2018-01-25 NOTE — NURSING NOTE
"Chief Complaint   Patient presents with     RECHECK     Fistula placement two weeks ago.        Initial /70  Pulse 82  Wt 68.5 kg (151 lb 1.6 oz)  SpO2 97%  BMI 25.94 kg/m2 Estimated body mass index is 25.94 kg/(m^2) as calculated from the following:    Height as of 1/9/18: 1.626 m (5' 4\").    Weight as of this encounter: 68.5 kg (151 lb 1.6 oz).  Medication Reconciliation: complete   Tiffany Paredes LPN       "

## 2018-01-25 NOTE — PROGRESS NOTES
Dialysis Access Service   Progress Note    S:  Ms. Banks is being seen today for surgical followup of her dialysis access.  She reports no issues with the wound, and no steal syndrome of the distal extremity. C/O a small fluid collection around AC fossa crease incision area, denies pain nor warm to touch in that area. Denies pain nor swelling in left arm, tingling/numbness or a change of color/temperature in left arm. S/P Left upper arm brachiocephalic AV fistula creation on 1/9/2018.    O:  Pulse:  [82] 82  BP: (152)/(70) 152/70  SpO2:  [97 %] 97 %  GENERAL: no distress  Circulation:   Radial pulse 3+  Ulnar pulse  3+   Capillary refill:  capillary refill < 3 sec    Sensory exam:   arm: Normal   [x]           Abnormal   []          Comment:    hand: Normal   [x]           Abnormal   []          Comment:   Motor exam:   arm: Normal   [x]           Abnormal   []          Comment:    hand: Normal   [x]           Abnormal   []          Comment:    Access: L upper extremity wound(s) healed. non-tender. Capillary refill < 3 sec, ++thrill and bruit via hand held doppler present.  A Small fluid collection around left AC fossa crease incision area, without S/S of infection. No edema in left arm, without apparent infection.    Assessment & Plan: Ms. Emerson dialysis access has matured well at this time point.    1. F/U with Dr. Cardenas in 4 weeks  2. Continue no blood draw and BP from left arm  3. May start hammer curl exercise in left arm with a squeeze ball. 15 minutes a time, 3-4 times a day as tolerated.  4. Check thrill from LUE AV fistula twice a day    We would like to see the patient back in the clinic in 4 weeks time to assess progress. The patient was counselled to contact our nurse coordinator, RHODA Kurtz CNS (Sum) at [phone] with any questions or concerns.  Thank you for the opportunity to participate in Ms. Banks's care.    TT: 15 min  CT: 15 min    RODRIGO Taveras (Sum)  Dialysis access  program   [facility]  Pager # [phone]

## 2018-01-26 DIAGNOSIS — T82.9XXA COMPLICATION OF VASCULAR ACCESS FOR DIALYSIS, INITIAL ENCOUNTER: ICD-10-CM

## 2018-01-26 DIAGNOSIS — T82.898A OTHER SPECIFIED COMPLICATION OF VASCULAR PROSTHETIC DEVICES, IMPLANTS AND GRAFTS, INITIAL ENCOUNTER (H): ICD-10-CM

## 2018-01-26 DIAGNOSIS — Z99.2 ESRD ON DIALYSIS (H): Primary | ICD-10-CM

## 2018-01-26 DIAGNOSIS — N18.6 ESRD ON DIALYSIS (H): Primary | ICD-10-CM

## 2018-01-29 ENCOUNTER — HOSPITAL ENCOUNTER (EMERGENCY)
Facility: CLINIC | Age: 78
Discharge: HOME | End: 2018-01-29
Attending: FAMILY MEDICINE | Admitting: FAMILY MEDICINE
Payer: MEDICARE

## 2018-01-29 ENCOUNTER — ANTICOAGULATION THERAPY VISIT (OUTPATIENT)
Dept: ANTICOAGULATION | Facility: CLINIC | Age: 78
End: 2018-01-29
Payer: COMMERCIAL

## 2018-01-29 ENCOUNTER — TELEPHONE (OUTPATIENT)
Dept: INTERNAL MEDICINE | Facility: CLINIC | Age: 78
End: 2018-01-29

## 2018-01-29 VITALS
WEIGHT: 150 LBS | DIASTOLIC BLOOD PRESSURE: 83 MMHG | TEMPERATURE: 97.3 F | RESPIRATION RATE: 18 BRPM | OXYGEN SATURATION: 91 % | SYSTOLIC BLOOD PRESSURE: 166 MMHG | BODY MASS INDEX: 25.75 KG/M2 | HEART RATE: 77 BPM

## 2018-01-29 DIAGNOSIS — T14.8XXA SKIN AVULSION: ICD-10-CM

## 2018-01-29 DIAGNOSIS — Z79.01 LONG TERM CURRENT USE OF ANTICOAGULANT THERAPY: ICD-10-CM

## 2018-01-29 LAB — INR PPP: 1.3

## 2018-01-29 PROCEDURE — 99283 EMERGENCY DEPT VISIT LOW MDM: CPT | Mod: Z6 | Performed by: FAMILY MEDICINE

## 2018-01-29 PROCEDURE — 99282 EMERGENCY DEPT VISIT SF MDM: CPT | Performed by: FAMILY MEDICINE

## 2018-01-29 PROCEDURE — 99207 ZZC NO BILLABLE SERVICE THIS VISIT: CPT | Performed by: INTERNAL MEDICINE
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
## 2018-01-29 NOTE — ED AVS SNAPSHOT
[facility] Emergency Department    [address] [place] [address]    Phone:  [phone]    Fax:  [phone]                                       Kathryn Banks   MRN: [id number]    Department:  [facility] Emergency Department   Date of Visit:  1/29/2018           After Visit Summary Signature Page     I have received my discharge instructions, and my questions have been answered. I have discussed any challenges I see with this plan with the nurse or doctor.    ..........................................................................................................................................  Patient/Patient Representative Signature      ..........................................................................................................................................  Patient Representative Print Name and Relationship to Patient    ..................................................               ................................................  Date                                            Time    ..........................................................................................................................................  Reviewed by Signature/Title    ...................................................              ..............................................  Date                                                            Time

## 2018-01-29 NOTE — TELEPHONE ENCOUNTER
Reason for Call:  INR    Who is calling?  Home Care:     Phone number:  [phone]    Fax number:      Name of caller: Marsha    INR Value:  1.3    Are there any other concerns:  No    Can we leave a detailed message on this number? YES    Phone number patient can be reached at: Other phone number:  [phone]*      Call taken on 1/29/2018 at 8:06 AM by Josephine Tejeda

## 2018-01-29 NOTE — PROGRESS NOTES
ANTICOAGULATION FOLLOW-UP CLINIC VISIT    Patient Name:  Kathryn Banks  Date:  1/29/2018  Contact Type:  Telephone/ Marsha  nurse    SUBJECTIVE:     Patient Findings     Positives Missed doses (missed on Tues 1/23), Unexplained INR or factor level change    Comments Reason for Call:  INR     Who is calling?  Home Care:      Phone number:  [phone]     Fax number:       Name of caller: Marsha     INR Value:  1.3     Are there any other concerns:  No     Can we leave a detailed message on this number? YES     Phone number patient can be reached at: Other phone number:  [phone]*        Call taken on 1/29/2018 at 8:06 AM by Josephine Tejeda              OBJECTIVE    INR   Date Value Ref Range Status   01/29/2018 1.3  Final       ASSESSMENT / PLAN  INR assessment SUB    Recheck INR In: 3 DAYS    INR Location [facility] INR      Anticoagulation Summary as of 1/29/2018     INR goal 2.0-3.0   Today's INR 1.3!   Maintenance plan 3 mg (1 mg x 3) on Sun; 2 mg (1 mg x 2) all other days   Full instructions 1/29: 4 mg; 1/30: 3 mg; 1/31: 3 mg; Otherwise 3 mg on Sun; 2 mg all other days   Weekly total 15 mg   Plan last modified Nicki Calvillo, RN (12/26/2017)   Next INR check 2/1/2018   Target end date     Indications   Atrial fibrillation (H) (Resolved) [I48.91]  Long-term (current) use of anticoagulants [Z79.01] (Resolved) [Z79.01]         Anticoagulation Episode Summary     INR check location     Preferred lab     Send INR reminders to [facility] POOL    Comments 1 mg, 2.5 mg, and 3 mg tabs (as of 11/27, per HC nurse)        Anticoagulation Care Providers     Provider Role Specialty Phone number    Dheeraj Jj MD [facility] Internal Medicine [phone]            See the Encounter Report to view Anticoagulation Flowsheet and Dosing Calendar (Go to Encounters tab in chart review, and find the Anticoagulation Therapy Visit)    Dosage adjustment made based on physician directed care plan.    4 mg Mon, 3 mg Tues and Wed  = 21 mg     Zo Davis RN
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
## 2018-01-29 NOTE — MR AVS SNAPSHOT
Kathryn WEEKS Jocelyn   1/29/2018   Anticoagulation Therapy Visit    Description:  77 year old female   Provider:  Dheeraj Jj MD   Department:   Anticoag           INR as of 1/29/2018     Today's INR 1.3!      Anticoagulation Summary as of 1/29/2018     INR goal 2.0-3.0   Today's INR 1.3!   Full instructions 1/29: 4 mg; 1/30: 3 mg; 1/31: 3 mg; Otherwise 3 mg on Sun; 2 mg all other days   Next INR check 2/1/2018    Indications   Atrial fibrillation (H) (Resolved) [I48.91]  Long-term (current) use of anticoagulants [Z79.01] (Resolved) [Z79.01]         Contact Numbers     Clinic Number:         January 2018 Details    Sun Mon Tue Wed Thu Fri Sat      1               2               3               4               5               6                 7               8               9               10               11               12               13                 14               15               16               17               18               19               20                 21               22               23               24               25               26               27                 28               29      4 mg   See details      30      3 mg         31      3 mg             Date Details   01/29 This INR check               How to take your warfarin dose     To take:  3 mg Take 3 of the 1 mg tablets.    To take:  4 mg Take 4 of the 1 mg tablets.           February 2018 Details    Sun Mon Tue Wed Thu Fri Sat         1            2               3                 4               5               6               7               8               9               10                 11               12               13               14               15               16               17                 18               19               20               21               22               23               24                 25               26               27               28                   Date Details    No additional details    Date of next INR:  2/1/2018         How to take your warfarin dose     To take:  2 mg Take 2 of the 1 mg tablets.
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
## 2018-01-30 ENCOUNTER — CARE COORDINATION (OUTPATIENT)
Dept: CARE COORDINATION | Facility: CLINIC | Age: 78
End: 2018-01-30

## 2018-01-30 NOTE — PROGRESS NOTES
Clinic Care Coordination Contact  OUTREACH    Referral Information:  Referral Source: CTS  Reason for Contact: RN CC call to patient for ED follow up while assigned RN CC is out of office  Care Conference: No     Universal Utilization:   ED Visits in last year: 2  Hospital visits in last year: 4  Last PCP appointment: 12/19/17  Missed Appointments: 21  Concerns: forgetting to take medications       Clinical Concerns:  Current Medical Concerns: Patient was in [facility] ED 1/29/18 due to left lower leg injury.  Patient was brought in by EMS.  Patient's wound was dressed and she was discharged back home.  Patient states her home care nurse was out yesterday to evaluate the wound and will have a WOCN out for assessment and recommendations.  Patient's next home care nurse visit will be 1/31/18.    Current Behavioral Concerns: Patient has diagnosis of anxiety.    Education Provided to patient: RN reviewed ED discharge instructions and informed patient of assigned RN CC's out of office status.  Clinical Pathway Name: None    Medication Management:  Home care RN managing.     Functional Status:  Mobility Status: Independent w/Device  Equipment Currently Used at Home: walker, rolling, walker, standard, other (see comments), dressing device, raised toilet, grab bar, shower chair (Scooter, toilet safety frame, reacher)  Transportation: Patient's spouse provides transportation.           Psychosocial:  Current living arrangement:: I live in a private home with family, I live in a private home with spouse  Financial/Insurance: No concerns this encounter.       Resources and Interventions:  Current Resources: Home Care; Home Care  PAS Number: [id number]  Senior Linkage Line Referral Placed: 07/02/16  Advanced Care Plans/Directives on file:: Yes     Patient/Caregiver understanding: Patient verbalized understanding of ED discharge instructions and of assigned RN CC's out of office status.    Frequency of Care Coordination:  PRN pharos  Upcoming appointment: 03/01/18     Plan:   Patient will continue to follow treatment plan as directed and follow up with PCP with concerns ongoing.   RN CC will hand off to assigned RN CC Meenakshi Larsen when she returns for ongoing management.    Melissa Behl BSN, RN, N  [facility] Care Coordinator  [phone]

## 2018-02-01 ENCOUNTER — ANTICOAGULATION THERAPY VISIT (OUTPATIENT)
Dept: ANTICOAGULATION | Facility: CLINIC | Age: 78
End: 2018-02-01
Payer: COMMERCIAL

## 2018-02-01 ENCOUNTER — TELEPHONE (OUTPATIENT)
Dept: INTERNAL MEDICINE | Facility: CLINIC | Age: 78
End: 2018-02-01

## 2018-02-01 LAB — INR PPP: 2

## 2018-02-01 NOTE — MR AVS SNAPSHOT
Kathryn Banks   2/1/2018   Anticoagulation Therapy Visit    Description:  77 year old female   Provider:  Dheeraj Jj MD   Department:  Ph Anticoag           INR as of 2/1/2018     Today's INR 2.0      Anticoagulation Summary as of 2/1/2018     INR goal 2.0-3.0   Today's INR 2.0   Full instructions 2/1: 3 mg; 2/3: 3 mg; Otherwise 3 mg on Sun; 2 mg all other days   Next INR check 2/5/2018    Indications   Atrial fibrillation (H) (Resolved) [I48.91]  Long-term (current) use of anticoagulants [Z79.01] (Resolved) [Z79.01]         Contact Numbers     Clinic Number:         February 2018 Details    Sun Mon Tue Wed Thu Fri Sat         1      3 mg   See details      2      2 mg         3      3 mg           4      3 mg         5            6               7               8               9               10                 11               12               13               14               15               16               17                 18               19               20               21               22               23               24                 25               26               27               28                   Date Details   02/01 This INR check       Date of next INR:  2/5/2018         How to take your warfarin dose     To take:  2 mg Take 2 of the 1 mg tablets.    To take:  3 mg Take 3 of the 1 mg tablets.

## 2018-02-01 NOTE — PROGRESS NOTES
ANTICOAGULATION FOLLOW-UP CLINIC VISIT    Patient Name:  Kathryn Banks  Date:  2/1/2018  Contact Type:  Telephone/ Karly - homecare    SUBJECTIVE:     Patient Findings     Positives No Problem Findings    Comments Reason for Call:  INR     Who is calling?  Home Care: [place]     Phone number:  [phone]     Fax number:       Name of caller: Karly     INR Value:  2.0     Are there any other concerns:  No     Can we leave a detailed message on this number? YES     Phone number patient can be reached at: Home number on file [phone] (home)        Call taken on 2/1/2018 at 11:28 AM by Jo-Ann Bartlett           OBJECTIVE    INR   Date Value Ref Range Status   02/01/2018 2.0  Final       ASSESSMENT / PLAN  INR assessment THER    Recheck INR In: 4 DAYS    INR Location Homecare INR      Anticoagulation Summary as of 2/1/2018     INR goal 2.0-3.0   Today's INR 2.0   Maintenance plan 3 mg (1 mg x 3) on Sun; 2 mg (1 mg x 2) all other days   Full instructions 2/1: 3 mg; 2/3: 3 mg; Otherwise 3 mg on Sun; 2 mg all other days   Weekly total 15 mg   Plan last modified Nicki Calvillo, RN (12/26/2017)   Next INR check 2/5/2018   Target end date     Indications   Atrial fibrillation (H) (Resolved) [I48.91]  Long-term (current) use of anticoagulants [Z79.01] (Resolved) [Z79.01]         Anticoagulation Episode Summary     INR check location     Preferred lab     Send INR reminders to [facility] POOL    Comments 1 mg, 2.5 mg, and 3 mg tabs (as of 11/27, per HC nurse)        Anticoagulation Care Providers     Provider Role Specialty Phone number    Dheeraj Jj MD [facility] Internal Medicine [phone]            See the Encounter Report to view Anticoagulation Flowsheet and Dosing Calendar (Go to Encounters tab in chart review, and find the Anticoagulation Therapy Visit)    Dosage adjustment made based on physician directed care plan.    Gianni Palumbo RN

## 2018-02-01 NOTE — TELEPHONE ENCOUNTER
Reason for Call:  INR    Who is calling?  Home Care: [place]    Phone number:  [phone]    Fax number:      Name of caller: Karly    INR Value:  2.0    Are there any other concerns:  No    Can we leave a detailed message on this number? YES    Phone number patient can be reached at: Home number on file [phone] (home)      Call taken on 2/1/2018 at 11:28 AM by Jo-Ann Bartlett

## 2018-02-05 ENCOUNTER — TELEPHONE (OUTPATIENT)
Dept: INTERNAL MEDICINE | Facility: CLINIC | Age: 78
End: 2018-02-05

## 2018-02-05 ENCOUNTER — ANTICOAGULATION THERAPY VISIT (OUTPATIENT)
Dept: ANTICOAGULATION | Facility: CLINIC | Age: 78
End: 2018-02-05
Payer: COMMERCIAL

## 2018-02-05 LAB — INR PPP: 2.6

## 2018-02-05 NOTE — PROGRESS NOTES
ANTICOAGULATION FOLLOW-UP CLINIC VISIT    Patient Name:  Kathryn Banks  Date:  2/5/2018  Contact Type:  Telephone    SUBJECTIVE:     Patient Findings     Positives No Problem Findings    Comments Reason for Call:  INR     Who is calling?  [facility]     Phone number: [phone]     Fax number:       Name of caller: Anisa     INR Value: 2.6     Are there any other concerns:  No     Can we leave a detailed message on this number? YES     Phone number patient can be reached at:       Call taken on 2/5/2018 at 8:25 AM by Erica Boo           OBJECTIVE    INR   Date Value Ref Range Status   02/05/2018 2.6  Final       ASSESSMENT / PLAN  INR assessment THER    Recheck INR In: 4 DAYS    INR Location Homecare INR      Anticoagulation Summary as of 2/5/2018     INR goal 2.0-3.0   Today's INR 2.6   Maintenance plan 3 mg (1 mg x 3) every day   Full instructions 3 mg every day   Weekly total 21 mg   Plan last modified Nicki Calvillo RN (2/5/2018)   Next INR check 2/9/2018   Target end date     Indications   Atrial fibrillation (H) (Resolved) [I48.91]  Long-term (current) use of anticoagulants [Z79.01] (Resolved) [Z79.01]         Anticoagulation Episode Summary     INR check location     Preferred lab     Send INR reminders to [facility] POOL    Comments 1 mg, 2.5 mg, and 3 mg tabs (as of 11/27, per HC nurse)        Anticoagulation Care Providers     Provider Role Specialty Phone number    Dheeraj Jj MD [facility] Internal Medicine [phone]            See the Encounter Report to view Anticoagulation Flowsheet and Dosing Calendar (Go to Encounters tab in chart review, and find the Anticoagulation Therapy Visit)    Dosage adjustment made based on physician directed care plan.    Nicki Calvillo, ROSARIO

## 2018-02-05 NOTE — TELEPHONE ENCOUNTER
Reason for Call:  INR    Who is calling?  [facility]    Phone number: [phone]    Fax number:      Name of caller: nAisa    INR Value: 2.6    Are there any other concerns:  No    Can we leave a detailed message on this number? YES    Phone number patient can be reached at:      Call taken on 2/5/2018 at 8:25 AM by Erica Boo

## 2018-02-05 NOTE — MR AVS SNAPSHOT
Kathryn Banks   2/5/2018   Anticoagulation Therapy Visit    Description:  77 year old female   Provider:  Dheeraj Jj MD   Department:  Ph Anticoag           INR as of 2/5/2018     Today's INR 2.6      Anticoagulation Summary as of 2/5/2018     INR goal 2.0-3.0   Today's INR 2.6   Full instructions 3 mg every day   Next INR check 2/9/2018    Indications   Atrial fibrillation (H) (Resolved) [I48.91]  Long-term (current) use of anticoagulants [Z79.01] (Resolved) [Z79.01]         Contact Numbers     Clinic Number:         February 2018 Details    Sun Mon Tue Wed Thu Fri Sat         1               2               3                 4               5      3 mg   See details      6      3 mg         7      3 mg         8      3 mg         9            10                 11               12               13               14               15               16               17                 18               19               20               21               22               23               24                 25               26               27               28                   Date Details   02/05 This INR check       Date of next INR:  2/9/2018         How to take your warfarin dose     To take:  3 mg Take 3 of the 1 mg tablets.

## 2018-02-09 ENCOUNTER — TELEPHONE (OUTPATIENT)
Dept: INTERNAL MEDICINE | Facility: CLINIC | Age: 78
End: 2018-02-09

## 2018-02-09 ENCOUNTER — ANTICOAGULATION THERAPY VISIT (OUTPATIENT)
Dept: ANTICOAGULATION | Facility: CLINIC | Age: 78
End: 2018-02-09
Payer: COMMERCIAL

## 2018-02-09 LAB — INR PPP: 3

## 2018-02-09 PROCEDURE — 99207 ZZC NO CHARGE NURSE ONLY: CPT | Performed by: INTERNAL MEDICINE

## 2018-02-09 NOTE — TELEPHONE ENCOUNTER
Reason for Call:  INR    Who is calling?  Home Care:     Phone number:      Fax number:      Name of caller:     INR Value:  3.0    Are there any other concerns:  No    Can we leave a detailed message on this number? YES    Phone number patient can be reached at:       Call taken on 2/9/2018 at 8:11 AM by Amanda Saba

## 2018-02-09 NOTE — MR AVS SNAPSHOT
Kathryn Banks   2/9/2018   Anticoagulation Therapy Visit    Description:  77 year old female   Provider:  Dheeraj Jj MD   Department:  Ph Anticoag           INR as of 2/9/2018     Today's INR 3.0      Anticoagulation Summary as of 2/9/2018     INR goal 2.0-3.0   Today's INR 3.0   Full instructions 2 mg on Fri; 3 mg all other days   Next INR check 2/16/2018    Indications   Atrial fibrillation (H) (Resolved) [I48.91]  Long-term (current) use of anticoagulants [Z79.01] (Resolved) [Z79.01]         Contact Numbers     Clinic Number:         February 2018 Details    Sun Mon Tue Wed Thu Fri Sat         1               2               3                 4               5               6               7               8               9      2 mg   See details      10      3 mg           11      3 mg         12      3 mg         13      3 mg         14      3 mg         15      3 mg         16            17                 18               19               20               21               22               23               24                 25               26               27               28                   Date Details   02/09 This INR check       Date of next INR:  2/16/2018         How to take your warfarin dose     To take:  2 mg Take 2 of the 1 mg tablets.    To take:  3 mg Take 3 of the 1 mg tablets.

## 2018-02-09 NOTE — PROGRESS NOTES
ANTICOAGULATION FOLLOW-UP CLINIC VISIT    Patient Name:  Kathryn Banks  Date:  2/9/2018  Contact Type:  Telephone/ Marsha,  nruse    SUBJECTIVE:     Patient Findings     Positives No Problem Findings    Comments Reason for Call:  INR     Who is calling?  Home Care:      Phone number:       Fax number:       Name of caller:      INR Value:  3.0     Are there any other concerns:  No     Can we leave a detailed message on this number? YES     Phone number patient can be reached at:         Call taken on 2/9/2018 at 8:11 AM by Amanda Saba              OBJECTIVE    INR   Date Value Ref Range Status   02/09/2018 3.0  Final       ASSESSMENT / PLAN  INR assessment THER    Recheck INR In: 1 WEEK    INR Location Homecare INR      Anticoagulation Summary as of 2/9/2018     INR goal 2.0-3.0   Today's INR 3.0   Maintenance plan 2 mg (1 mg x 2) on Fri; 3 mg (1 mg x 3) all other days   Full instructions 2 mg on Fri; 3 mg all other days   Weekly total 20 mg   Plan last modified Zo Davis RN (2/9/2018)   Next INR check 2/16/2018   Target end date     Indications   Atrial fibrillation (H) (Resolved) [I48.91]  Long-term (current) use of anticoagulants [Z79.01] (Resolved) [Z79.01]         Anticoagulation Episode Summary     INR check location     Preferred lab     Send INR reminders to [facility] POOL    Comments 1 mg, 2.5 mg, and 3 mg tabs (as of 11/27, per HC nurse)        Anticoagulation Care Providers     Provider Role Specialty Phone number    Dheeraj Jj MD [facility] Internal Medicine [phone]            See the Encounter Report to view Anticoagulation Flowsheet and Dosing Calendar (Go to Encounters tab in chart review, and find the Anticoagulation Therapy Visit)    Dosage adjustment made based on physician directed care plan.        Zo Davis RN

## 2018-02-13 DIAGNOSIS — E55.9 VITAMIN D DEFICIENCY: Primary | ICD-10-CM

## 2018-02-13 NOTE — TELEPHONE ENCOUNTER
Vitamin D3 5,000 units       Last Written Prescription Date:    Last Fill Quantity: ,   # refills:   Last Office Visit: 12/19/17  Future Office visit:       Routing refill request to provider for review/approval because:  Medication is reported/historical

## 2018-02-19 ENCOUNTER — TELEPHONE (OUTPATIENT)
Dept: INTERNAL MEDICINE | Facility: CLINIC | Age: 78
End: 2018-02-19

## 2018-02-19 ENCOUNTER — TELEPHONE (OUTPATIENT)
Dept: FAMILY MEDICINE | Facility: OTHER | Age: 78
End: 2018-02-19

## 2018-02-19 ENCOUNTER — ANTICOAGULATION THERAPY VISIT (OUTPATIENT)
Dept: ANTICOAGULATION | Facility: CLINIC | Age: 78
End: 2018-02-19
Payer: COMMERCIAL

## 2018-02-19 ENCOUNTER — DOCUMENTATION ONLY (OUTPATIENT)
Dept: CARE COORDINATION | Facility: CLINIC | Age: 78
End: 2018-02-19

## 2018-02-19 LAB — INR PPP: 2.2

## 2018-02-19 NOTE — MR AVS SNAPSHOT
Kathryn Banks   2/19/2018   Anticoagulation Therapy Visit    Description:  77 year old female   Provider:  Dheeraj Jj MD   Department:   Anticoag           INR as of 2/19/2018     Today's INR 2.2      Anticoagulation Summary as of 2/19/2018     INR goal 2.0-3.0   Today's INR 2.2   Full instructions 2 mg on Fri; 3 mg all other days   Next INR check 2/27/2018    Indications   Atrial fibrillation (H) (Resolved) [I48.91]  Long-term (current) use of anticoagulants [Z79.01] (Resolved) [Z79.01]         Contact Numbers     Clinic Number:         February 2018 Details    Sun Mon Tue Wed Thu Fri Sat         1               2               3                 4               5               6               7               8               9               10                 11               12               13               14               15               16               17                 18               19      3 mg   See details      20      3 mg         21      3 mg         22      3 mg         23      2 mg         24      3 mg           25      3 mg         26      3 mg         27            28                   Date Details   02/19 This INR check       Date of next INR:  2/27/2018         How to take your warfarin dose     To take:  2 mg Take 2 of the 1 mg tablets.    To take:  3 mg Take 3 of the 1 mg tablets.

## 2018-02-19 NOTE — PROGRESS NOTES
ANTICOAGULATION FOLLOW-UP CLINIC VISIT    Patient Name:  Kathryn Banks  Date:  2/19/2018  Contact Type:  Telephone    SUBJECTIVE:     Patient Findings     Positives No Problem Findings    Comments Reason for Call:  INR     Who is calling?  Home Care: [facility]     Phone number:  [phone]     Fax number:  n/a     Name of caller: Anisa     INR Value:  2.2     Are there any other concerns:  No- is waiting in home for a call back for medication set up     Can we leave a detailed message on this number? YES           OBJECTIVE    INR   Date Value Ref Range Status   02/19/2018 2.2  Final       ASSESSMENT / PLAN  INR assessment THER    Recheck INR In: 8 DAYS    INR Location Home[facility] INR      Anticoagulation Summary as of 2/19/2018     INR goal 2.0-3.0   Today's INR 2.2   Maintenance plan 2 mg (1 mg x 2) on Fri; 3 mg (1 mg x 3) all other days   Full instructions 2 mg on Fri; 3 mg all other days   Weekly total 20 mg   No change documented Nicki Calvillo RN   Plan last modified Zo Davis RN (2/9/2018)   Next INR check 2/27/2018   Target end date     Indications   Atrial fibrillation (H) (Resolved) [I48.91]  Long-term (current) use of anticoagulants [Z79.01] (Resolved) [Z79.01]         Anticoagulation Episode Summary     INR check location     Preferred lab     Send INR reminders to [facility] POOL    Comments 1 mg, 2.5 mg, and 3 mg tabs (as of 11/27, per HC nurse)        Anticoagulation Care Providers     Provider Role Specialty Phone number    Dheeraj Jj MD [facility] Internal Medicine [phone]            See the Encounter Report to view Anticoagulation Flowsheet and Dosing Calendar (Go to Encounters tab in chart review, and find the Anticoagulation Therapy Visit)    Dosage adjustment made based on physician directed care plan.    Nicki Calvillo, RN

## 2018-02-19 NOTE — PROGRESS NOTES
[facility] now requests orders and shares plan of care/discharge summaries for some patients through [employer].  Please REPLY TO THIS MESSAGE in order to give authorization for orders when needed.  This is considered a verbal order, you will still receive a faxed copy of orders for signature.  Thank you for your assistance in improving collaboration for our patients.    Pt seen by home care today, pt has had a 7lb wt increase since Saturday.  States dialysis was not concerned with this today.  Edema is only trace to none, no changes to SOB, denies chest pains or heart palpitations.    Also, pt missed 5 am doses, all of her noon doses and 1 pm dose of medications over the past week.  Pt educated on importance of taking all of her meds as ordered.  Pt has not picked her meds up from the pharmacy that were ordered last week, is out of vitamin D completely.  Educated on importance of picking up meds to prevent missing any meds.    Another FYI, stasis ulcers on RLE were all closed, now 2 have opened again, will continue wound cares as ordered.    Please let me know if you have further recommendations.    Thanks,  Anisa Licea, ROSARIO    [email or web address]

## 2018-02-19 NOTE — TELEPHONE ENCOUNTER
Reason for Call:  INR    Who is calling?  Home Care: [facility]    Phone number:  [phone]    Fax number:  n/a    Name of caller: Anisa    INR Value:  2.2    Are there any other concerns:  No- is waiting in home for a call back for medication set up    Can we leave a detailed message on this number? YES    Phone number patient can be reached at: Other phone number:  [phone]      Call taken on 2/19/2018 at 3:12 PM by Judson Erazo

## 2018-02-19 NOTE — TELEPHONE ENCOUNTER
Reason for call:  Form  Reason for Call:  Form, our goal is to have forms completed with 72 hours, however, some forms may require a visit or additional information.    Type of letter, form or note:  medical    Who is the form from?: Home care    Where did the form come from: form was faxed in    What clinic location was the form placed at?: [facility] - [phone]    Where the form was placed: [facility]    What number is listed as a contact on the form?: [phone]       Additional comments: please sign and date, fax    Call taken on 2/19/2018 at 10:00 AM by Sindy Weems

## 2018-02-27 ENCOUNTER — ANTICOAGULATION THERAPY VISIT (OUTPATIENT)
Dept: ANTICOAGULATION | Facility: CLINIC | Age: 78
End: 2018-02-27
Payer: COMMERCIAL

## 2018-02-27 ENCOUNTER — TELEPHONE (OUTPATIENT)
Dept: INTERNAL MEDICINE | Facility: CLINIC | Age: 78
End: 2018-02-27

## 2018-02-27 LAB — INR PPP: 1.2

## 2018-02-27 PROCEDURE — 99207 ZZC NO CHARGE NURSE ONLY: CPT | Performed by: INTERNAL MEDICINE

## 2018-02-27 NOTE — TELEPHONE ENCOUNTER
Reason for Call:  INR    Who is calling?  Home Care: [facility]    Phone number: [phone]    Fax number:     Name of caller: Keegan    INR Value:  1.2, patient missed 2 doses of coumadin. Keegan is waiting in the home for directions.     Are there any other concerns:  Yes:     Can we leave a detailed message on this number? YES    Phone number patient can be reached at:      Call taken on 2/27/2018 at 3:35 PM by Erica Boo

## 2018-02-27 NOTE — PROGRESS NOTES
ANTICOAGULATION FOLLOW-UP CLINIC VISIT    Patient Name:  Kathryn Banks  Date:  2/27/2018  Contact Type:  Telephone/ Keegan,  nurse    SUBJECTIVE:     Patient Findings     Positives Missed doses (pt missed 3 mg doses on Sat and Sun)    Comments Working with 1 mg tabs             OBJECTIVE    INR   Date Value Ref Range Status   02/27/2018 1.2  Final       ASSESSMENT / PLAN  INR assessment SUB    Recheck INR In: 3 DAYS    INR Location [facility] INR      Anticoagulation Summary as of 2/27/2018     INR goal 2.0-3.0   Today's INR 1.2!   Maintenance plan 2 mg (1 mg x 2) on Fri; 3 mg (1 mg x 3) all other days   Full instructions 2/27: 4 mg; 2/28: 4 mg; 3/1: 4 mg; Otherwise 2 mg on Fri; 3 mg all other days   Weekly total 20 mg   Plan last modified Zo Davis RN (2/9/2018)   Next INR check 3/2/2018   Target end date     Indications   Atrial fibrillation (H) (Resolved) [I48.91]  Long-term (current) use of anticoagulants [Z79.01] (Resolved) [Z79.01]         Anticoagulation Episode Summary     INR check location     Preferred lab     Send INR reminders to [facility] POOL    Comments 1 mg, 2.5 mg, and 3 mg tabs (as of 11/27, per HC nurse)        Anticoagulation Care Providers     Provider Role Specialty Phone number    Dheeraj Jj MD [facility] Internal Medicine [phone]            See the Encounter Report to view Anticoagulation Flowsheet and Dosing Calendar (Go to Encounters tab in chart review, and find the Anticoagulation Therapy Visit)    Dosage adjustment made based on physician directed care plan.      Zo Davis, RN

## 2018-02-27 NOTE — MR AVS SNAPSHOT
Kathryn MICKY Banks   2/27/2018   Anticoagulation Therapy Visit    Description:  77 year old female   Provider:  Dheeraj Jj MD   Department:  Ph Anticoag           INR as of 2/27/2018     Today's INR 1.2!      Anticoagulation Summary as of 2/27/2018     INR goal 2.0-3.0   Today's INR 1.2!   Full instructions 2/27: 4 mg; 2/28: 4 mg; 3/1: 4 mg; Otherwise 2 mg on Fri; 3 mg all other days   Next INR check 3/2/2018    Indications   Atrial fibrillation (H) (Resolved) [I48.91]  Long-term (current) use of anticoagulants [Z79.01] (Resolved) [Z79.01]         Contact Numbers     Clinic Number:         February 2018 Details    Sun Mon Tue Wed Thu Fri Sat         1               2               3                 4               5               6               7               8               9               10                 11               12               13               14               15               16               17                 18               19               20               21               22               23               24                 25               26               27      4 mg   See details      28      4 mg             Date Details   02/27 This INR check               How to take your warfarin dose     To take:  4 mg Take 4 of the 1 mg tablets.           March 2018 Details    Sun Mon Tue Wed Thu Fri Sat         1      4 mg         2            3                 4               5               6               7               8               9               10                 11               12               13               14               15               16               17                 18               19               20               21               22               23               24                 25               26               27               28               29               30               31                Date Details   No additional details    Date of next INR:   3/2/2018         How to take your warfarin dose     To take:  2 mg Take 2 of the 1 mg tablets.    To take:  4 mg Take 4 of the 1 mg tablets.

## 2018-03-01 ENCOUNTER — OFFICE VISIT (OUTPATIENT)
Dept: VASCULAR SURGERY | Facility: CLINIC | Age: 78
End: 2018-03-01
Payer: COMMERCIAL

## 2018-03-01 ENCOUNTER — RADIANT APPOINTMENT (OUTPATIENT)
Dept: ULTRASOUND IMAGING | Facility: CLINIC | Age: 78
End: 2018-03-01
Attending: CLINICAL NURSE SPECIALIST
Payer: COMMERCIAL

## 2018-03-01 VITALS
RESPIRATION RATE: 15 BRPM | OXYGEN SATURATION: 95 % | HEART RATE: 72 BPM | DIASTOLIC BLOOD PRESSURE: 61 MMHG | SYSTOLIC BLOOD PRESSURE: 130 MMHG

## 2018-03-01 DIAGNOSIS — T82.898A OTHER SPECIFIED COMPLICATION OF VASCULAR PROSTHETIC DEVICES, IMPLANTS AND GRAFTS, INITIAL ENCOUNTER (H): ICD-10-CM

## 2018-03-01 DIAGNOSIS — T82.9XXA COMPLICATION OF VASCULAR ACCESS FOR DIALYSIS, INITIAL ENCOUNTER: ICD-10-CM

## 2018-03-01 DIAGNOSIS — N18.6 ESRD (END STAGE RENAL DISEASE) ON DIALYSIS (H): Primary | ICD-10-CM

## 2018-03-01 DIAGNOSIS — N18.6 ESRD ON DIALYSIS (H): ICD-10-CM

## 2018-03-01 DIAGNOSIS — Z99.2 ESRD (END STAGE RENAL DISEASE) ON DIALYSIS (H): Primary | ICD-10-CM

## 2018-03-01 DIAGNOSIS — Z99.2 ESRD ON DIALYSIS (H): ICD-10-CM

## 2018-03-01 ASSESSMENT — PAIN SCALES - GENERAL: PAINLEVEL: NO PAIN (0)

## 2018-03-01 NOTE — LETTER
3/1/2018       RE: Kathryn Banks  [id number] [facility] [address]     Dear Colleague,    Thank you for referring your patient, Kathryn Banks, to the [facility] at [facility]. Please see a copy of my visit note below.      No acute events, healing well post AVF creation, no complaints.  No hand coolness or pain.    B/P: 130/61, T: Data Unavailable, P: 72, R: 15  Alert oriented no acute distress  Left arm AVF with good thrill, Palpable radial pulse, good strength and sensation at hand  Incisions healing well, clean dry and intact    US LUE AVF reviewed, good caliber of fistula, flow volumes of brachial artery and fistula adequate.    Assessment/Plan:  76 yo female with ESRD on HD via catheter, s/p Left brachiocephalic AVF creation on 1/9/18    OK to start using fistula for dialysis  Follow up with [facility] for catheter removal once HD going well via fistula access (~1 month).  Follow up with vascular surgery as needed, patient instructed to continue excercises.    Stacey LeJeune, MD  VASCULAR SURGERY               I saw the patient with the resident.  I agree with the resident note and plan of care attached.  Fistula appears ready to use.  Follow up if issues.      Cassie Cardenas MD     I spent >50% of this 15 min visit in counseling    Again, thank you for allowing me to participate in the care of your patient.      Sincerely,    Cassie Cardenas MD

## 2018-03-01 NOTE — NURSING NOTE
Chief Complaint   Patient presents with     RECHECK     Follow up fistula placement to left arm        Vitals:    03/01/18 1429   BP: 130/61   BP Location: Right arm   Pulse: 72   Resp: 15   SpO2: 95%       There is no height or weight on file to calculate BMI.         Simin Hernandez LPN

## 2018-03-01 NOTE — MR AVS SNAPSHOT
After Visit Summary   3/1/2018    Kathryn Banks    MRN: [id number]           Patient Information     Date Of Birth          1940        Visit Information        Provider Department      3/1/2018 2:45 PM Cassie Cardenas MD  [facility]        Today's Diagnoses     ESRD (end stage renal disease) on dialysis (H)    -  1       Follow-ups after your visit        Who to contact     Please call your clinic at [phone] to:    Ask questions about your health    Make or cancel appointments    Discuss your medicines    Learn about your test results    Speak to your doctor            Additional Information About Your Visit        MyChart Information     [facility] gives you secure access to your electronic health record. If you see a primary care provider, you can also send messages to your care team and make appointments. If you have questions, please call your primary care clinic.  If you do not have a primary care provider, please call [phone] and they will assist you.      [facility] is an electronic gateway that provides easy, online access to your medical records. With [facility], you can request a clinic appointment, read your test results, renew a prescription or communicate with your care team.     To access your existing account, please contact your [facility] or call [phone] for assistance.        Care EveryWhere ID     This is your Care EveryWhere ID. This could be used by other organizations to access your [place] medical records  [id number]        Your Vitals Were     Pulse Respirations Pulse Oximetry Breastfeeding?          72 15 95% No         Blood Pressure from Last 3 Encounters:   03/01/18 130/61   01/29/18 166/83   01/25/18 152/70    Weight from Last 3 Encounters:   01/29/18 150 lb   01/25/18 151 lb 1.6 oz   01/09/18 150 lb 5.7 oz              Today, you had the following     No orders found for display       Primary Care Provider Office  Phone # Fax #    Dheeraj Jj [phone]       [facility] [address] [place] [address]        Equal Access to Services     CAROLINE BLAND : Hadii pascual ku hadjignao Sojeannetteali, waaxda luqadaha, qaybta kaalmada joyda, tay banda layolandeangel lidia. So [facility] [place] [facility] [phone].    ATENCIÓN: Si habla español, tiene a medrano disposición servicios gratuitos de asistencia lingüística. Jonathaname al [phone].    We comply with applicable federal civil rights laws and [place] laws. We do not discriminate on the basis of race, color, national origin, age, disability, sex, sexual orientation, or gender identity.            Thank you!     Thank you for choosing [facility]  for your care. Our goal is always to provide you with excellent care. Hearing back from our patients is one way we can continue to improve our services. Please take a few minutes to complete the written survey that you may receive in the mail after your visit with us. Thank you!             Your Updated Medication List - Protect others around you: Learn how to safely use, store and throw away your medicines at www.disposemymeds.org.          This list is accurate as of 3/1/18 11:59 PM.  Always use your most recent med list.                   Brand Name Dispense Instructions for use Diagnosis    ACETAMINOPHEN PO      Take 650 mg by mouth every 4 hours as needed for pain For pain 1-5 out of 10        calcium acetate 667 MG Caps capsule    PHOSLO    180 capsule    Take 1 capsule (667 mg) by mouth 3 times daily (with meals)    Chronic kidney disease, unspecified CKD stage       diltiazem 240 MG 24 hr capsule    CARDIZEM CD    90 capsule    Take 1 capsule (240 mg) by mouth daily    Paroxysmal atrial fibrillation (H)       fluticasone 50 MCG/ACT spray    FLONASE    1 Bottle    Spray 1 spray into both nostrils daily    Nasal congestion       HYDROcodone-acetaminophen 5-325 MG per tablet    NORCO    20 tablet     Take 1-2 tablets by mouth every 4 hours as needed for other (Moderate to Severe Pain)    ESRD (end stage renal disease) on dialysis (H)       hypromellose-dextran Soln ophthalmic solution      Place 1 drop into both eyes 3 times daily as needed for dry eyes    Dry eyes       ipratropium - albuterol 0.5 mg/2.5 mg/3 mL 0.5-2.5 (3) MG/3ML neb solution    DUONEB     Take 1 vial by nebulization every 6 hours as needed        LORazepam 0.5 MG tablet    ATIVAN    20 tablet    Take 0.5 tablets (0.25 mg) by mouth every 8 hours as needed for anxiety    Anxiety       metoprolol tartrate 50 MG tablet    LOPRESSOR    60 tablet    Take 1 tablet (50 mg) by mouth 2 times daily    Benign essential hypertension       nitroGLYcerin 0.4 MG sublingual tablet    NITROSTAT    25 tablet    Place 1 tablet (0.4 mg) under the tongue every 5 minutes as needed for chest pain    Hx of non-ST elevation myocardial infarction (NSTEMI), Atherosclerosis of native coronary artery of native heart without angina pectoris, Postsurgical aortocoronary bypass status       order for DME     1 Device    Equipment being ordered: cpap machine, mask, humidifier, tubing and filters    ANABEL (obstructive sleep apnea)       pentoxifylline 400 MG CR tablet    TRENtal    90 tablet    Take 1 tablet (400 mg) by mouth daily    PAD (peripheral artery disease) (H)       pramipexole 0.125 MG tablet    MIRAPEX    90 tablet    Take 1 tablet (0.125 mg) by mouth 3 times daily    Restless leg syndrome       pravastatin 40 MG tablet    PRAVACHOL    90 tablet    Take 1 tablet (40 mg) by mouth daily    Hx of non-ST elevation myocardial infarction (NSTEMI), Atherosclerosis of native coronary artery of native heart without angina pectoris, Type 2 diabetes mellitus with other circulatory complications       * RENAL 1 MG Caps      Take 1 capsule by mouth daily        * NEPHROCAPS 1 MG capsule     100 capsule    Take 1 capsule by mouth daily    ESRD (end stage renal disease) on dialysis  (H)       * VITAMIN D (CHOLECALCIFEROL) PO      Take 5,000 Units by mouth daily        * cholecalciferol 5000 UNITS Tabs tablet    vitamin D3    90 tablet    Take 1 tablet (5,000 Units) by mouth daily    Vitamin D deficiency       * warfarin 2 MG tablet    COUMADIN    30 tablet    1 mg daily for aflut and adjust for desired INR 2.0 to 3.0    Atrial flutter, unspecified type (H)       * warfarin 1 MG tablet    COUMADIN    180 tablet    TAKE TWO TABLETS BY MOUTH ONCE DAILY AS DIRECTED BY  COUMADIN  CLINIC    Atrial flutter, unspecified type (H), Paroxysmal atrial fibrillation (H)       * Notice:  This list has 6 medication(s) that are the same as other medications prescribed for you. Read the directions carefully, and ask your doctor or other care provider to review them with you.

## 2018-03-01 NOTE — PROGRESS NOTES
No acute events, healing well post AVF creation, no complaints.  No hand coolness or pain.    B/P: 130/61, T: Data Unavailable, P: 72, R: 15  Alert oriented no acute distress  Left arm AVF with good thrill, Palpable radial pulse, good strength and sensation at hand  Incisions healing well, clean dry and intact    US LUE AVF reviewed, good caliber of fistula, flow volumes of brachial artery and fistula adequate.    Assessment/Plan:  76 yo female with ESRD on HD via catheter, s/p Left brachiocephalic AVF creation on 1/9/18    OK to start using fistula for dialysis  Follow up with [facility] for catheter removal once HD going well via fistula access (~1 month).  Follow up with vascular surgery as needed, patient instructed to continue excercises.    Stacey LeJeune, MD  VASCULAR SURGERY

## 2018-03-02 ENCOUNTER — TELEPHONE (OUTPATIENT)
Dept: TRANSPLANT | Facility: CLINIC | Age: 78
End: 2018-03-02

## 2018-03-02 ENCOUNTER — TELEPHONE (OUTPATIENT)
Dept: INTERNAL MEDICINE | Facility: CLINIC | Age: 78
End: 2018-03-02

## 2018-03-02 ENCOUNTER — ANTICOAGULATION THERAPY VISIT (OUTPATIENT)
Dept: ANTICOAGULATION | Facility: CLINIC | Age: 78
End: 2018-03-02
Payer: COMMERCIAL

## 2018-03-02 LAB — INR PPP: 1.2

## 2018-03-02 PROCEDURE — 99207 ZZC NO CHARGE NURSE ONLY: CPT | Performed by: INTERNAL MEDICINE

## 2018-03-02 NOTE — TELEPHONE ENCOUNTER
Reason for Call:  INR    Who is calling?  Home Care:     Phone number:      Fax number:      Name of caller:     INR Value:  1.2    Are there any other concerns:  No    Can we leave a detailed message on this number? YES    Phone number patient can be reached at:       Call taken on 3/2/2018 at 8:11 AM by Amanda Saba

## 2018-03-02 NOTE — TELEPHONE ENCOUNTER
Per Dr. Cardenas clinic note from yesterday, it is ok to use AV fistula with single needle per new AV fistula cannulation protocol.  Writer called [facility] dialysis  And spoke with the charge RN for instruction of AV fistula with single needle per new AV fistula cannulation protocol. Single needle 17 G needle for arterial and return via CVC line for a week and use 17 G x 2 needles for the 2nd week. Once advance to 16 G needles and call writer to schedule CVC tunneled line removal.  Writer provided contact phone number and instructed dialysis  RN to call writer with any questions or concerns. She verbalized acceptance and understanding of plan.

## 2018-03-02 NOTE — PROGRESS NOTES
ANTICOAGULATION FOLLOW-UP CLINIC VISIT    Patient Name:  Kathryn Banks  Date:  3/2/2018  Contact Type:  Telephone/ Marsha  nurse    SUBJECTIVE:     Patient Findings     Positives Change in diet/appetite (had some extra greens since Tuesday when her INR was last checked), Missed doses (missed Coumadin on Sat and Sun last weekend)    Comments Reason for Call:  INR     Who is calling?  Home Care:      Phone number:       Fax number:       Name of caller:      INR Value:  1.2     Are there any other concerns:  No     Can we leave a detailed message on this number? YES     Phone number patient can be reached at:         Call taken on 3/2/2018 at 8:11 AM by Amanda Saba           OBJECTIVE    INR   Date Value Ref Range Status   03/02/2018 1.2  Final       ASSESSMENT / PLAN  INR assessment SUB    Recheck INR In: 3 DAYS    [facility]R Location [facility] INR      Anticoagulation Summary as of 3/2/2018     INR goal 2.0-3.0   Today's INR 1.2!   Maintenance plan 2 mg (1 mg x 2) on Fri; 3 mg (1 mg x 3) all other days   Full instructions 3/2: 4 mg; 3/3: 4 mg; 3/4: 4 mg; Otherwise 2 mg on Fri; 3 mg all other days   Weekly total 20 mg   Plan last modified Zo Davis, RN (2/9/2018)   Next INR check 3/5/2018   Target end date     Indications   Atrial fibrillation (H) (Resolved) [I48.91]  Long-term (current) use of anticoagulants [Z79.01] (Resolved) [Z79.01]         Anticoagulation Episode Summary     INR check location     Preferred lab     Send INR reminders to [facility] POOL    Comments 1 mg, 2.5 mg, and 3 mg tabs (as of 11/27, per HC nurse)        Anticoagulation Care Providers     Provider Role Specialty Phone number    Dheeraj Jj MD [facility] Internal Medicine [phone]            See the Encounter Report to view Anticoagulation Flowsheet and Dosing Calendar (Go to Encounters tab in chart review, and find the Anticoagulation Therapy Visit)    Dosage adjustment made based on physician directed care  plan.        Zo Davis RN

## 2018-03-02 NOTE — MR AVS SNAPSHOT
Kathryn MICKY Banks   3/2/2018   Anticoagulation Therapy Visit    Description:  77 year old female   Provider:  Dheeraj Jj MD   Department:  Ph Anticoag           INR as of 3/2/2018     Today's INR 1.2!      Anticoagulation Summary as of 3/2/2018     INR goal 2.0-3.0   Today's INR 1.2!   Full instructions 3/2: 4 mg; 3/3: 4 mg; 3/4: 4 mg; Otherwise 2 mg on Fri; 3 mg all other days   Next INR check 3/5/2018    Indications   Atrial fibrillation (H) (Resolved) [I48.91]  Long-term (current) use of anticoagulants [Z79.01] (Resolved) [Z79.01]         Contact Numbers     Clinic Number:         March 2018 Details    Sun Mon Tue Wed Thu Fri Sat         1               2      4 mg   See details      3      4 mg           4      4 mg         5            6               7               8               9               10                 11               12               13               14               15               16               17                 18               19               20               21               22               23               24                 25               26               27               28               29               30               31                Date Details   03/02 This INR check       Date of next INR:  3/5/2018         How to take your warfarin dose     To take:  3 mg Take 3 of the 1 mg tablets.    To take:  4 mg Take 4 of the 1 mg tablets.

## 2018-03-05 ENCOUNTER — TELEPHONE (OUTPATIENT)
Dept: INTERNAL MEDICINE | Facility: CLINIC | Age: 78
End: 2018-03-05

## 2018-03-05 ENCOUNTER — ANTICOAGULATION THERAPY VISIT (OUTPATIENT)
Dept: ANTICOAGULATION | Facility: CLINIC | Age: 78
End: 2018-03-05
Payer: COMMERCIAL

## 2018-03-05 LAB — INR PPP: 1.2

## 2018-03-05 NOTE — MR AVS SNAPSHOT
Kathryn MICKY Banks   3/5/2018   Anticoagulation Therapy Visit    Description:  77 year old female   Provider:  Dheeraj Jj MD   Department:  Ph Anticoag           INR as of 3/5/2018     Today's INR 1.2!      Anticoagulation Summary as of 3/5/2018     INR goal 2.0-3.0   Today's INR 1.2!   Full instructions 3/5: 5 mg; 3/6: 5 mg; 3/7: 5 mg; 3/8: 5 mg   Next INR check 3/9/2018    Indications   Atrial fibrillation (H) (Resolved) [I48.91]  Long-term (current) use of anticoagulants [Z79.01] (Resolved) [Z79.01]         Contact Numbers     Clinic Number:         March 2018 Details    Sun Mon Tue Wed Thu Fri Sat         1               2               3                 4               5      5 mg   See details      6      5 mg         7      5 mg         8      5 mg         9            10                 11               12               13               14               15               16               17                 18               19               20               21               22               23               24                 25               26               27               28               29               30               31                Date Details   03/05 This INR check       Date of next INR:  3/9/2018         How to take your warfarin dose     To take:  5 mg Take 5 of the 1 mg tablets.

## 2018-03-05 NOTE — PROGRESS NOTES
ANTICOAGULATION FOLLOW-UP CLINIC VISIT    Patient Name:  Kathryn Banks  Date:  3/5/2018  Contact Type:  Telephone/ Jessie  nurse    SUBJECTIVE:     Patient Findings     Positives Unexplained INR or factor level change    Comments Reason for Call:  INR     Who is calling?  Home Care:      Phone number:       Fax number:       Name of caller:      INR Value:  1.2     Are there any other concerns:  No     Can we leave a detailed message on this number? YES     Phone number patient can be reached at:         Call taken on 3/5/2018 at 3:41 PM by Amanda Saba           OBJECTIVE    INR   Date Value Ref Range Status   03/05/2018 1.2  Final       ASSESSMENT / PLAN  INR assessment SUB    Recheck INR In: 4 DAYS    INR Location Homecare INR      Anticoagulation Summary as of 3/5/2018     INR goal 2.0-3.0   Today's INR 1.2!   Maintenance plan No maintenance plan   Full instructions 3/5: 5 mg; 3/6: 5 mg; 3/7: 5 mg; 3/8: 5 mg   Plan last modified Zo Davis RN (3/5/2018)   Next INR check 3/9/2018   Target end date     Indications   Atrial fibrillation (H) (Resolved) [I48.91]  Long-term (current) use of anticoagulants [Z79.01] (Resolved) [Z79.01]         Anticoagulation Episode Summary     INR check location     Preferred lab     Send INR reminders to [facility] POOL    Comments 1 mg, 2.5 mg, and 3 mg tabs (as of 11/27, per HC nurse)        Anticoagulation Care Providers     Provider Role Specialty Phone number    Dheeraj Jj MD [facility] Internal Medicine [phone]            See the Encounter Report to view Anticoagulation Flowsheet and Dosing Calendar (Go to Encounters tab in chart review, and find the Anticoagulation Therapy Visit)    Dosage adjustment made based on physician directed care plan.        Zo Davis, ROSARIO

## 2018-03-05 NOTE — TELEPHONE ENCOUNTER
Reason for Call:  INR    Who is calling?  Home Care:     Phone number:      Fax number:      Name of caller:     INR Value:  1.2    Are there any other concerns:  No    Can we leave a detailed message on this number? YES    Phone number patient can be reached at:       Call taken on 3/5/2018 at 3:41 PM by Amanda Saab

## 2018-03-05 NOTE — PROGRESS NOTES
I saw the patient with the resident.  I agree with the resident note and plan of care attached.  Fistula appears ready to use.  Follow up if issues.      Cassie Cardenas MD     I spent >50% of this 15 min visit in counseling

## 2018-03-09 ENCOUNTER — TELEPHONE (OUTPATIENT)
Dept: FAMILY MEDICINE | Facility: CLINIC | Age: 78
End: 2018-03-09

## 2018-03-09 ENCOUNTER — ANTICOAGULATION THERAPY VISIT (OUTPATIENT)
Dept: ANTICOAGULATION | Facility: CLINIC | Age: 78
End: 2018-03-09
Payer: COMMERCIAL

## 2018-03-09 LAB — INR PPP: 2.4

## 2018-03-09 PROCEDURE — 99207 ZZC NO CHARGE NURSE ONLY: CPT | Performed by: INTERNAL MEDICINE

## 2018-03-09 NOTE — TELEPHONE ENCOUNTER
Reason for Call:  INR    Who is calling?  [facility]: [facility]    Phone number:  [phone]    Fax number:      Name of caller: Marsha    INR Value:  2.4    Are there any other concerns:  No    Can we leave a detailed message on this number? YES    Phone number patient can be reached at: Other phone number:  [phone]*      Call taken on 3/9/2018 at 8:16 AM by Katlyn Gavin

## 2018-03-09 NOTE — PROGRESS NOTES
ANTICOAGULATION FOLLOW-UP CLINIC VISIT    Patient Name:  Kathryn Banks  Date:  3/9/2018  Contact Type:  Telephone/ Marsha,  nurse    SUBJECTIVE:     Patient Findings     Positives No Problem Findings    Comments Reason for Call:  INR     Who is calling?  [facility]: [facility]     Phone number:  [phone]     Fax number:       Name of caller: Marsha     INR Value:  2.4     Are there any other concerns:  No     Can we leave a detailed message on this number? YES     Phone number patient can be reached at: Other phone number:  [phone]*        Call taken on 3/9/2018 at 8:16 AM by Katlyn Gavin           OBJECTIVE    INR   Date Value Ref Range Status   03/09/2018 2.4  Final       ASSESSMENT / PLAN  INR assessment THER    Recheck INR In: 5 DAYS    INR Location [facility] INR      Anticoagulation Summary as of 3/9/2018     INR goal 2.0-3.0   Today's INR 2.4   Maintenance plan No maintenance plan   Full instructions 3/9: 2.5 mg; 3/10: 5 mg; 3/11: 5 mg; 3/12: 2.5 mg; 3/13: 5 mg   Plan last modified Zo Davis RN (3/5/2018)   Next INR check 3/14/2018   Target end date     Indications   Atrial fibrillation (H) (Resolved) [I48.91]  Long-term (current) use of anticoagulants [Z79.01] (Resolved) [Z79.01]         Anticoagulation Episode Summary     INR check location     Preferred lab     Send INR reminders to [facility] POOL    Comments 1 mg, 2.5 mg, and 3 mg tabs (as of 11/27, per HC nurse)        Anticoagulation Care Providers     Provider Role Specialty Phone number    Dheeraj Jj MD [facility] Internal Medicine [phone]            See the Encounter Report to view Anticoagulation Flowsheet and Dosing Calendar (Go to Encounters tab in chart review, and find the Anticoagulation Therapy Visit)    Dosage adjustment made based on physician directed care plan.      Zo Davis, RN

## 2018-03-09 NOTE — MR AVS SNAPSHOT
Kathryn WEEKS Jocelyn   3/9/2018   Anticoagulation Therapy Visit    Description:  77 year old female   Provider:  Dheeraj Jj MD   Department:  Ph Anticoag           INR as of 3/9/2018     Today's INR 2.4      Anticoagulation Summary as of 3/9/2018     INR goal 2.0-3.0   Today's INR 2.4   Full instructions 3/9: 2.5 mg; 3/10: 5 mg; 3/11: 5 mg; 3/12: 2.5 mg; 3/13: 5 mg   Next INR check 3/14/2018    Indications   Atrial fibrillation (H) (Resolved) [I48.91]  Long-term (current) use of anticoagulants [Z79.01] (Resolved) [Z79.01]         Contact Numbers     Clinic Number:         March 2018 Details    Sun Mon Tue Wed Thu Fri Sat         1               2               3                 4               5               6               7               8               9      2.5 mg   See details      10      5 mg           11      5 mg         12      2.5 mg         13      5 mg         14            15               16               17                 18               19               20               21               22               23               24                 25               26               27               28               29               30               31                Date Details   03/09 This INR check       Date of next INR:  3/14/2018         How to take your warfarin dose     To take:  2.5 mg Take 1 of the 2.5 mg tablets.    To take:  5 mg Take 2 of the 2.5 mg tablets.

## 2018-03-14 ENCOUNTER — TELEPHONE (OUTPATIENT)
Dept: INTERNAL MEDICINE | Facility: CLINIC | Age: 78
End: 2018-03-14

## 2018-03-14 ENCOUNTER — ANTICOAGULATION THERAPY VISIT (OUTPATIENT)
Dept: ANTICOAGULATION | Facility: CLINIC | Age: 78
End: 2018-03-14
Payer: COMMERCIAL

## 2018-03-14 LAB — INR PPP: 4.3

## 2018-03-14 PROCEDURE — 99207 ZZC NO CHARGE NURSE ONLY: CPT | Performed by: INTERNAL MEDICINE

## 2018-03-14 NOTE — TELEPHONE ENCOUNTER
Reason for Call:  INR    Who is calling?  Home [facility]: [facility]    Phone number:  [phone]    Fax number:  Na     Name of caller: Mariella    INR Value:  4.3    Are there any other concerns:  No    Can we leave a detailed message on this number? YES    Phone number patient can be reached at: [phone]      Call taken on 3/14/2018 at 8:35 AM by Zo Cardenas

## 2018-03-14 NOTE — PROGRESS NOTES
ANTICOAGULATION FOLLOW-UP CLINIC VISIT    Patient Name:  Kathryn Banks  Date:  3/14/2018  Contact Type:  Telephone/ Marsha  nurse    SUBJECTIVE:     Patient Findings     Positives Missed doses (missed last night's dose of 5 mg)    Comments Reason for Call:  INR     Who is calling?  [facility]: [facility]      Phone number:  [phone]     Fax number:  Na      Name of caller: Mariella     INR Value:  4.3     Are there any other concerns:  No     Can we leave a detailed message on this number? YES     Phone number patient can be reached at: [phone]        Call taken on 3/14/2018 at 8:35 AM by Zo Cardenas           OBJECTIVE    INR   Date Value Ref Range Status   03/14/2018 4.3  Final       ASSESSMENT / PLAN  INR assessment SUPRA    Recheck INR In: 2 DAYS    INR Location [facility] INR      Anticoagulation Summary as of 3/14/2018     INR goal 2.0-3.0   Today's INR 4.3!   Maintenance plan No maintenance plan   Full instructions 3/14: 2.5 mg; 3/15: 2.5 mg   Plan last modified Zo Davis RN (3/5/2018)   Next INR check 3/16/2018   Target end date     Indications   Atrial fibrillation (H) (Resolved) [I48.91]  Long-term (current) use of anticoagulants [Z79.01] (Resolved) [Z79.01]         Anticoagulation Episode Summary     INR check location     Preferred lab     Send INR reminders to [facility] POOL    Comments 1 mg, 2.5 mg, and 3 mg tabs (as of 11/27, per  nurse)        Anticoagulation Care Providers     Provider Role Specialty Phone number    Dheeraj Jj MD [facility] Internal Medicine [phone]            See the Encounter Report to view Anticoagulation Flowsheet and Dosing Calendar (Go to Encounters tab in chart review, and find the Anticoagulation Therapy Visit)    Dosage adjustment made based on physician directed care plan.        Zo Davis, ROSARIO

## 2018-03-14 NOTE — MR AVS SNAPSHOT
Kathryn Banks   3/14/2018   Anticoagulation Therapy Visit    Description:  78 year old female   Provider:  Dheeraj Jj MD   Department:  Ph Anticoag           INR as of 3/14/2018     Today's INR 4.3!      Anticoagulation Summary as of 3/14/2018     INR goal 2.0-3.0   Today's INR 4.3!   Full instructions 3/14: 2.5 mg; 3/15: 2.5 mg   Next INR check 3/16/2018    Indications   Atrial fibrillation (H) (Resolved) [I48.91]  Long-term (current) use of anticoagulants [Z79.01] (Resolved) [Z79.01]         Contact Numbers     Clinic Number:         March 2018 Details    Sun Mon Tue Wed Thu Fri Sat         1               2               3                 4               5               6               7               8               9               10                 11               12               13               14      2.5 mg   See details      15      2.5 mg         16            17                 18               19               20               21               22               23               24                 25               26               27               28               29               30               31                Date Details   03/14 This INR check       Date of next INR:  3/16/2018         How to take your warfarin dose     To take:  2.5 mg Take 1 of the 2.5 mg tablets.

## 2018-03-16 ENCOUNTER — ANTICOAGULATION THERAPY VISIT (OUTPATIENT)
Dept: ANTICOAGULATION | Facility: CLINIC | Age: 78
End: 2018-03-16
Payer: COMMERCIAL

## 2018-03-16 ENCOUNTER — TELEPHONE (OUTPATIENT)
Dept: INTERNAL MEDICINE | Facility: CLINIC | Age: 78
End: 2018-03-16

## 2018-03-16 LAB — INR PPP: 3.1

## 2018-03-16 PROCEDURE — 99207 ZZC NO BILLABLE SERVICE THIS VISIT: CPT | Performed by: INTERNAL MEDICINE

## 2018-03-16 NOTE — MR AVS SNAPSHOT
Kathryn MICKY Banks   3/16/2018   Anticoagulation Therapy Visit    Description:  78 year old female   Provider:  Dheeraj Jj MD   Department:  Ph Anticoag           INR as of 3/16/2018     Today's INR 3.1!      Anticoagulation Summary as of 3/16/2018     INR goal 2.0-3.0   Today's INR 3.1!   Full instructions 3/16: 2.5 mg; 3/17: 5 mg; 3/18: 2.5 mg; 3/19: 5 mg; 3/20: 2.5 mg   Next INR check 3/21/2018    Indications   Atrial fibrillation (H) (Resolved) [I48.91]  Long-term (current) use of anticoagulants [Z79.01] (Resolved) [Z79.01]         Contact Numbers     Clinic Number:         March 2018 Details    Sun Mon Tue Wed Thu Fri Sat         1               2               3                 4               5               6               7               8               9               10                 11               12               13               14               15               16      2.5 mg   See details      17      5 mg           18      2.5 mg         19      5 mg         20      2.5 mg         21            22               23               24                 25               26               27               28               29               30               31                Date Details   03/16 This INR check       Date of next INR:  3/21/2018         How to take your warfarin dose     To take:  2.5 mg Take 1 of the 2.5 mg tablets.    To take:  5 mg Take 2 of the 2.5 mg tablets.

## 2018-03-16 NOTE — PROGRESS NOTES
ANTICOAGULATION FOLLOW-UP CLINIC VISIT    Patient Name:  Kathryn Banks  Date:  3/16/2018  Contact Type:  Telephone/ Marsha - homecare    SUBJECTIVE:     Patient Findings     Positives No Problem Findings    Comments Reason for Call:  INR     Who is calling?  Home Care: [facility]     Phone number:  [phone]     Fax number:       Name of caller: Marsha     INR Value:  3.1                          Are there any other concerns:  No     Can we leave a detailed message on this number? Not Applicable     Phone number patient can be reached at: Other phone number:  [phone]        Call taken on 3/16/2018 at 3:11 PM by Mehnaz Guallpa           OBJECTIVE    INR   Date Value Ref Range Status   03/16/2018 3.1  Final       ASSESSMENT / PLAN  INR assessment THER    Recheck INR In: 5 DAYS    INR Location [facility] INR      Anticoagulation Summary as of 3/16/2018     INR goal 2.0-3.0   Today's INR 3.1!   Maintenance plan No maintenance plan   Full instructions 3/16: 2.5 mg; 3/17: 5 mg; 3/18: 2.5 mg; 3/19: 5 mg; 3/20: 2.5 mg   Plan last modified Zo Davis RN (3/5/2018)   Next INR check 3/21/2018   Target end date     Indications   Atrial fibrillation (H) (Resolved) [I48.91]  Long-term (current) use of anticoagulants [Z79.01] (Resolved) [Z79.01]         Anticoagulation Episode Summary     INR check location     Preferred lab     Send INR reminders to [facility] POOL    Comments 1 mg, 2.5 mg, and 3 mg tabs (as of 11/27, per HC nurse)        Anticoagulation Care Providers     Provider Role Specialty Phone number    Dheeraj Jj MD [facility] Internal Medicine [phone]            See the Encounter Report to view Anticoagulation Flowsheet and Dosing Calendar (Go to Encounters tab in chart review, and find the Anticoagulation Therapy Visit)    Dosage adjustment made based on physician directed care plan.    Gianni Palumbo, RN

## 2018-03-16 NOTE — TELEPHONE ENCOUNTER
Reason for Call:  INR    Who is calling?  Home Care: [facility]    Phone number:  [phone]    Fax number:      Name of caller: Marsha    INR Value:  3.1     Are there any other concerns:  No    Can we leave a detailed message on this number? Not Applicable    Phone number patient can be reached at: Other phone number:  [phone]      Call taken on 3/16/2018 at 3:11 PM by Mehnaz Guallpa

## 2018-03-21 ENCOUNTER — TELEPHONE (OUTPATIENT)
Dept: FAMILY MEDICINE | Facility: CLINIC | Age: 78
End: 2018-03-21

## 2018-03-21 ENCOUNTER — ANTICOAGULATION THERAPY VISIT (OUTPATIENT)
Dept: ANTICOAGULATION | Facility: CLINIC | Age: 78
End: 2018-03-21
Payer: COMMERCIAL

## 2018-03-21 LAB — INR PPP: 2.6

## 2018-03-21 PROCEDURE — 99207 ZZC NO CHARGE NURSE ONLY: CPT | Performed by: INTERNAL MEDICINE

## 2018-03-21 NOTE — TELEPHONE ENCOUNTER
Reason for Call:  INR    Who is calling?  Home Care: [facility]    Phone number:  [phone]    Fax number:  NA    Name of caller: Marsha    INR Value:  2.6    Are there any other concerns:  Yes: please call with dosing instructions    Can we leave a detailed message on this number? YES    Phone number patient can be reached at: Home number on file [phone] (home)      Call taken on 3/21/2018 at 3:58 PM by Joyce Cummings

## 2018-03-21 NOTE — PROGRESS NOTES
ANTICOAGULATION FOLLOW-UP CLINIC VISIT    Patient Name:  Kathryn Banks  Date:  3/21/2018  Contact Type:  Telephone    SUBJECTIVE:     Patient Findings     Positives No Problem Findings    Comments Reason for Call:  INR     Who is calling?  Home Care: [facility]     Phone number:  [phone]     Fax number:  NA     Name of caller: Marsha     INR Value:  2.6     Are there any other concerns:  Yes: please call with dosing instructions     Can we leave a detailed message on this number? YES              OBJECTIVE    INR   Date Value Ref Range Status   03/21/2018 2.6  Final       ASSESSMENT / PLAN  INR assessment THER    Recheck INR In: 1 WEEK    INR Location Clinic      Anticoagulation Summary as of 3/21/2018     INR goal 2.0-3.0   Today's INR 2.6   Maintenance plan 5 mg (2.5 mg x 2) on Mon, Fri; 2.5 mg (2.5 mg x 1) all other days   Full instructions 5 mg on Mon, Fri; 2.5 mg all other days   Weekly total 22.5 mg   Plan last modified Nicki Calvillo RN (3/21/2018)   Next INR check 3/28/2018   Target end date     Indications   Atrial fibrillation (H) (Resolved) [I48.91]  Long-term (current) use of anticoagulants [Z79.01] (Resolved) [Z79.01]         Anticoagulation Episode Summary     INR check location     Preferred lab     Send INR reminders to [facility] POOL    Comments 1 mg, 2.5 mg, and 3 mg tabs (as of 11/27, per  nurse)        Anticoagulation Care Providers     Provider Role Specialty Phone number    Dheeraj Jj MD [facility] Internal Medicine [phone]            See the Encounter Report to view Anticoagulation Flowsheet and Dosing Calendar (Go to Encounters tab in chart review, and find the Anticoagulation Therapy Visit)    Dosage adjustment made based on physician directed care plan.    Nicki Calvillo, RN

## 2018-03-21 NOTE — MR AVS SNAPSHOT
Kathryn MICKY Banks   3/21/2018   Anticoagulation Therapy Visit    Description:  78 year old female   Provider:  Dheeraj Jj MD   Department:  Ph Anticoag           INR as of 3/21/2018     Today's INR 2.6      Anticoagulation Summary as of 3/21/2018     INR goal 2.0-3.0   Today's INR 2.6   Full instructions 5 mg on Mon, Fri; 2.5 mg all other days   Next INR check 3/28/2018    Indications   Atrial fibrillation (H) (Resolved) [I48.91]  Long-term (current) use of anticoagulants [Z79.01] (Resolved) [Z79.01]         Contact Numbers     Clinic Number:         March 2018 Details    Sun Mon Tue Wed Thu Fri Sat         1               2               3                 4               5               6               7               8               9               10                 11               12               13               14               15               16               17                 18               19               20               21      2.5 mg   See details      22      2.5 mg         23      5 mg         24      2.5 mg           25      2.5 mg         26      5 mg         27      2.5 mg         28            29               30               31                Date Details   03/21 This INR check       Date of next INR:  3/28/2018         How to take your warfarin dose     To take:  2.5 mg Take 1 of the 2.5 mg tablets.    To take:  5 mg Take 2 of the 2.5 mg tablets.

## 2018-03-22 ENCOUNTER — TELEPHONE (OUTPATIENT)
Dept: INTERNAL MEDICINE | Facility: CLINIC | Age: 78
End: 2018-03-22

## 2018-03-22 NOTE — TELEPHONE ENCOUNTER
Clinic Care Coordination Contact  Care Team Conversations    Called and spoke with pt, states she is out of drug coverage for at least a month. States she is waiting to hear back from her insurance is she is eligible for coverage.  Talked to pt about our [facility] assistance program and gave her the number to Stuart Stevenson to contact.  Veronique will be able to answer her questions and send the paperwork she needs.  Advised pt to call back if she is not eligible and we can look into other options.     Meenakshi REYNAGA, RN, PHN  Care Coordination    [facility]  [address] [place] [address]  Office: [phone]  Fax [phone]   [facility]  [address] [place] [address]  Office: [phone] Fax [phone]  [email or web address]   [email or web address]   Connect with [facility] Services on social media.

## 2018-03-22 NOTE — TELEPHONE ENCOUNTER
Reason for Call:  Other     Detailed comments: Kathryn forgot to pay her insurance so it has been cancelled.  So kathryn states she will not be able to pay for her meds this month,  She is hoping to be able to have them be next month.   She will be without meds for about 2 weeks.    Phone Number Patient can be reached at: [phone]    Best Time: any    Can we leave a detailed message on this number? YES    Call taken on 3/22/2018 at 8:33 AM by Zo Cardenas

## 2018-03-23 ENCOUNTER — APPOINTMENT (OUTPATIENT)
Dept: GENERAL RADIOLOGY | Facility: CLINIC | Age: 78
End: 2018-03-23
Attending: EMERGENCY MEDICINE
Payer: MEDICARE

## 2018-03-23 ENCOUNTER — HOSPITAL ENCOUNTER (EMERGENCY)
Facility: CLINIC | Age: 78
Discharge: ANOTHER HOSPITAL | End: 2018-03-23
Attending: EMERGENCY MEDICINE | Admitting: EMERGENCY MEDICINE
Payer: MEDICARE

## 2018-03-23 ENCOUNTER — PATIENT OUTREACH (OUTPATIENT)
Dept: CARE COORDINATION | Facility: CLINIC | Age: 78
End: 2018-03-23

## 2018-03-23 VITALS
SYSTOLIC BLOOD PRESSURE: 142 MMHG | HEART RATE: 90 BPM | TEMPERATURE: 96.7 F | RESPIRATION RATE: 20 BRPM | DIASTOLIC BLOOD PRESSURE: 84 MMHG | OXYGEN SATURATION: 97 %

## 2018-03-23 DIAGNOSIS — S70.01XA CONTUSION OF RIGHT HIP, INITIAL ENCOUNTER: Primary | ICD-10-CM

## 2018-03-23 DIAGNOSIS — S63.501A WRIST SPRAIN, RIGHT, INITIAL ENCOUNTER: ICD-10-CM

## 2018-03-23 DIAGNOSIS — S46.911A SHOULDER STRAIN, RIGHT, INITIAL ENCOUNTER: ICD-10-CM

## 2018-03-23 DIAGNOSIS — S32.591A CLOSED FRACTURE OF RAMUS OF RIGHT PUBIS, INITIAL ENCOUNTER (H): ICD-10-CM

## 2018-03-23 LAB
ALBUMIN UR-MCNC: 100 MG/DL
ANION GAP SERPL CALCULATED.3IONS-SCNC: 12 MMOL/L (ref 3–14)
APPEARANCE UR: ABNORMAL
BACTERIA #/AREA URNS HPF: ABNORMAL /HPF
BASOPHILS # BLD AUTO: 0.1 10E9/L (ref 0–0.2)
BASOPHILS NFR BLD AUTO: 0.5 %
BILIRUB UR QL STRIP: NEGATIVE
BUN SERPL-MCNC: 42 MG/DL (ref 7–30)
CALCIUM SERPL-MCNC: 6.9 MG/DL (ref 8.5–10.1)
CHLORIDE SERPL-SCNC: 108 MMOL/L (ref 94–109)
CO2 SERPL-SCNC: 23 MMOL/L (ref 20–32)
COLOR UR AUTO: YELLOW
CREAT SERPL-MCNC: 3.68 MG/DL (ref 0.52–1.04)
DIFFERENTIAL METHOD BLD: ABNORMAL
EOSINOPHIL # BLD AUTO: 0.2 10E9/L (ref 0–0.7)
EOSINOPHIL NFR BLD AUTO: 1.4 %
ERYTHROCYTE [DISTWIDTH] IN BLOOD BY AUTOMATED COUNT: 17.7 % (ref 10–15)
GFR SERPL CREATININE-BSD FRML MDRD: 12 ML/MIN/1.7M2
GLUCOSE SERPL-MCNC: 142 MG/DL (ref 70–99)
GLUCOSE UR STRIP-MCNC: NEGATIVE MG/DL
HCT VFR BLD AUTO: 31.4 % (ref 35–47)
HGB BLD-MCNC: 9.6 G/DL (ref 11.7–15.7)
HGB UR QL STRIP: ABNORMAL
IMM GRANULOCYTES # BLD: 0.1 10E9/L (ref 0–0.4)
IMM GRANULOCYTES NFR BLD: 0.9 %
INR PPP: 1.46 (ref 0.86–1.14)
KETONES UR STRIP-MCNC: NEGATIVE MG/DL
LEUKOCYTE ESTERASE UR QL STRIP: ABNORMAL
LYMPHOCYTES # BLD AUTO: 1.1 10E9/L (ref 0.8–5.3)
LYMPHOCYTES NFR BLD AUTO: 9.7 %
MCH RBC QN AUTO: 27.8 PG (ref 26.5–33)
MCHC RBC AUTO-ENTMCNC: 30.6 G/DL (ref 31.5–36.5)
MCV RBC AUTO: 91 FL (ref 78–100)
MONOCYTES # BLD AUTO: 0.9 10E9/L (ref 0–1.3)
MONOCYTES NFR BLD AUTO: 8.5 %
NEUTROPHILS # BLD AUTO: 8.6 10E9/L (ref 1.6–8.3)
NEUTROPHILS NFR BLD AUTO: 79 %
NITRATE UR QL: NEGATIVE
PH UR STRIP: 7 PH (ref 5–7)
PLATELET # BLD AUTO: 292 10E9/L (ref 150–450)
POTASSIUM SERPL-SCNC: 3.2 MMOL/L (ref 3.4–5.3)
RBC # BLD AUTO: 3.45 10E12/L (ref 3.8–5.2)
RBC #/AREA URNS AUTO: 9 /HPF (ref 0–2)
SODIUM SERPL-SCNC: 143 MMOL/L (ref 133–144)
SOURCE: ABNORMAL
SP GR UR STRIP: 1.01 (ref 1–1.03)
UROBILINOGEN UR STRIP-MCNC: 0 MG/DL (ref 0–2)
WBC # BLD AUTO: 10.9 10E9/L (ref 4–11)
WBC #/AREA URNS AUTO: >182 /HPF (ref 0–5)
WBC CLUMPS #/AREA URNS HPF: PRESENT /HPF

## 2018-03-23 PROCEDURE — 99285 EMERGENCY DEPT VISIT HI MDM: CPT | Mod: 25 | Performed by: EMERGENCY MEDICINE

## 2018-03-23 PROCEDURE — 99285 EMERGENCY DEPT VISIT HI MDM: CPT | Mod: Z6 | Performed by: EMERGENCY MEDICINE

## 2018-03-23 PROCEDURE — 80048 BASIC METABOLIC PNL TOTAL CA: CPT | Performed by: EMERGENCY MEDICINE

## 2018-03-23 PROCEDURE — 25000132 ZZH RX MED GY IP 250 OP 250 PS 637: Mod: GY | Performed by: FAMILY MEDICINE

## 2018-03-23 PROCEDURE — 85025 COMPLETE CBC W/AUTO DIFF WBC: CPT | Performed by: EMERGENCY MEDICINE

## 2018-03-23 PROCEDURE — 87086 URINE CULTURE/COLONY COUNT: CPT | Performed by: FAMILY MEDICINE

## 2018-03-23 PROCEDURE — 73060 X-RAY EXAM OF HUMERUS: CPT | Mod: TC,RT

## 2018-03-23 PROCEDURE — 51702 INSERT TEMP BLADDER CATH: CPT | Performed by: EMERGENCY MEDICINE

## 2018-03-23 PROCEDURE — 36415 COLL VENOUS BLD VENIPUNCTURE: CPT | Performed by: EMERGENCY MEDICINE

## 2018-03-23 PROCEDURE — 73502 X-RAY EXAM HIP UNI 2-3 VIEWS: CPT | Mod: TC

## 2018-03-23 PROCEDURE — 96374 THER/PROPH/DIAG INJ IV PUSH: CPT | Mod: 59 | Performed by: EMERGENCY MEDICINE

## 2018-03-23 PROCEDURE — A9270 NON-COVERED ITEM OR SERVICE: HCPCS | Mod: GY | Performed by: FAMILY MEDICINE

## 2018-03-23 PROCEDURE — 87088 URINE BACTERIA CULTURE: CPT | Performed by: FAMILY MEDICINE

## 2018-03-23 PROCEDURE — 87186 SC STD MICRODIL/AGAR DIL: CPT | Performed by: FAMILY MEDICINE

## 2018-03-23 PROCEDURE — 81001 URINALYSIS AUTO W/SCOPE: CPT | Performed by: FAMILY MEDICINE

## 2018-03-23 PROCEDURE — 25000128 H RX IP 250 OP 636: Performed by: FAMILY MEDICINE

## 2018-03-23 PROCEDURE — 73110 X-RAY EXAM OF WRIST: CPT | Mod: TC,RT

## 2018-03-23 PROCEDURE — 85610 PROTHROMBIN TIME: CPT | Performed by: EMERGENCY MEDICINE

## 2018-03-23 RX ORDER — FENTANYL CITRATE 50 UG/ML
25 INJECTION, SOLUTION INTRAMUSCULAR; INTRAVENOUS
Status: DISCONTINUED | OUTPATIENT
Start: 2018-03-23 | End: 2018-03-23 | Stop reason: HOSPADM

## 2018-03-23 RX ORDER — ACETAMINOPHEN 500 MG
1000 TABLET ORAL ONCE
Status: COMPLETED | OUTPATIENT
Start: 2018-03-23 | End: 2018-03-23

## 2018-03-23 RX ADMIN — ACETAMINOPHEN 1000 MG: 500 TABLET ORAL at 07:46

## 2018-03-23 RX ADMIN — FENTANYL CITRATE 25 MCG: 50 INJECTION, SOLUTION INTRAMUSCULAR; INTRAVENOUS at 08:19

## 2018-03-23 NOTE — DISCHARGE INSTRUCTIONS
Lower Extremity Contusion  You have a contusion (bruise) of a lower extremity (leg, knee, ankle, foot, or toe). Symptoms include pain, swelling, and skin discoloration. No bones are broken. This injury may take from a few days to a few weeks to heal.  During that time, the bruise may change from reddish in color, to purple-blue, to green-yellow, to yellow-brown.  Home care    Unless another medicine was prescribed, you can take acetaminophen, ibuprofen, or naproxen to control pain. (If you have chronic liver or kidney disease or ever had a stomach ulcer or gastrointestinal bleeding, talk with your doctor before using these medicines.)    Elevate the injured area to reduce pain and swelling. As much as possible, sit or lie down with the injured area raised about the level of your heart. This is especially important during the first 48 hours.    Ice the injured area to help reduce pain and swelling. Wrap a cold source (ice pack or ice cubes in a plastic bag) in a thin towel. Apply to the bruised area for 20 minutes every 1 to 2 hours the first day. Continue this 3 to 4 times a day until the pain and swelling goes away.    If crutches have been advised, do not bear full weight on the injured leg until you can do so without pain. You may return to sports when you are able to put full weight and impact on the injured leg without pain.  Follow up  Follow up with your healthcare provider or our staff as advised. Call if you are not improving within the next 1 to 2 weeks.  When to seek medical advice   Call your healthcare provider right away if any of these occur:    Increased pain or swelling    Foot or toes become cold, blue, numb or tingly    Signs of infection: Warmth, drainage, or increased redness or pain around the injury    Inability to move the injured area     Frequent bruising for unknown reasons  Date Last Reviewed: 2/1/2017 2000-2017 [employer]. [address] [place] [address], [place] [address].  All rights reserved. This information is not intended as a substitute for professional medical care. Always follow your healthcare professional's instructions.

## 2018-03-23 NOTE — ED PROVIDER NOTES
History     Chief Complaint   Patient presents with     Fall     HPI  Kathryn Banks is a 78 year old female on dialysis and Coumadin for atrial fibrillation who presents the emergency department complaining of right wrist, right shoulder and right hip pain after a fall.  She has end-stage renal disease and is on hemodialysis and occasionally gets some dizziness.  This morning while trying to move about in the kitchen she became dizzy and fell to the ground.  She was on the ground for about a half hour before her significant other found her.  She did not strike her head.    Problem List:    Patient Active Problem List    Diagnosis Date Noted     Contusion of right hip, initial encounter 03/23/2018     Priority: Medium     ESRD (end stage renal disease) on dialysis (H) 11/06/2017     Priority: Medium     Physical deconditioning 11/06/2017     Priority: Medium     Bilateral leg edema 11/06/2017     Priority: Medium     DM type 2 -- Hgb A1C 6.6 on 10/13/17 10/29/2017     Priority: Medium     SBO -- S/P Lysis of Adhes 10/24/17 10/22/2017     Priority: Medium     Paroxysmal atrial fibrillation (H) 10/20/2017     Priority: Medium     Anxiety 10/20/2017     Priority: Medium     Anemia in chronic kidney disease 09/20/2017     Priority: Medium     Microscopic hematuria 06/21/2017     Priority: Medium     Acute on chronic renal failure -- Dialysis started 10/2017 06/12/2017     Priority: Medium     Memory loss 06/12/2017     Priority: Medium     Proteinuria 2.1 g/g Cr 06/02/2017     Priority: Medium     Chronic heart failure (H) with preserved EF 05/31/2017     Priority: Medium     Atrial flutter (H) - present 6/12 05/31/2017     Priority: Medium     Pulmonary hypertension 05/24/2017     Priority: Medium     Postprocedural hypotension 03/09/2017     Priority: Medium     Fall 02/10/2017     Priority: Medium     Type 2 diabetes mellitus with complication (H) 02/03/2017     Priority: Medium     Fall off motorized mobility  connor, initial encounter 02/03/2017     Priority: Medium     Acute decompensated heart failure (H) 02/03/2017     Priority: Medium     Overview:   HFpEF with EF 60-65% by echo 2/4/17.       Essential hypertension with goal blood pressure less than 140/90 09/13/2016     Priority: Medium     Osteoarthritis of hip 04/29/2015     Priority: Medium     Lymphedema 11/10/2014     Priority: Medium     Health Care Home 01/20/2014     Priority: Medium     State Tier Level:  Tier 3  Status:  Closed  Care Coordinator:  Pinky Oden if needed in the future.     See Letters for [facility] Plan         CAD - NSTEMI, CABG x 5 in 2006, angio 2013 patent grafts except occluded graft to PL 02/04/2013     Priority: Medium     Hyperlipidemia LDL goal <100 10/31/2010     Priority: Medium     Kidney stone 12/29/2009     Priority: Medium     Morbid obesity (H) 05/14/2008     Priority: Medium     Esophageal reflux 03/28/2006     Priority: Medium     Lipoma of other skin and subcutaneous tissue 09/07/2004     Priority: Medium     ANABEL on CPAP      Priority: Medium     Advanced directives, counseling/discussion 11/12/2012     Priority: Low     Advance Directive received and scanned. Click on Code in the patient header to view. 11/12/2012          History of peptic ulcer disease 10/17/2007     Priority: Low     Problem list name updated by automated process. Provider to review       Restless leg syndrome      Priority: Low        Past Medical History:    Past Medical History:   Diagnosis Date     Carpal tunnel syndrome      Coronary atherosclerosis of native coronary artery 2006     OBSTRUCTIVE SLEEP APNEA      Osteoarthrosis, unspecified whether generalized or localized, unspecified site      Other and combined forms of senile cataract 2000     Other and unspecified hyperlipidemia      RESTLESS LEGS SYNDROME      TENOSYNOV HAND/WRIST NEC 6/30/2006     Type II or unspecified type diabetes mellitus without mention of complication, not stated as  uncontrolled      Typical atrial flutter (H) 2007     Unspecified essential hypertension        Past Surgical History:    Past Surgical History:   Procedure Laterality Date     ANGIOPLASTY       C APPENDECTOMY       C CABG, VEIN, FIVE      SVG x 4 and LIMA to LAD     C SOTO W/O FACETEC FORAMOT/DSKC  VRT SEG, LUMBAR  1968     C REMV CATARACT INTRACAP,INSERT LENS      Bilateral     C TOTAL ABDOM HYSTERECTOMY       - fibroids     C VAGOTOMY/PYLOROPLASTY,SELECT  1970     COLONOSCOPY  12/3/2007     COLONOSCOPY  2014    Procedure: COLONOSCOPY;  Colonoscopy;  Surgeon: Zack Stearns MD;  Location: [facility]     CREATE GRAFT LOOP ARTERIOVENOUS UPPER EXTREMITY Left 2018    Procedure: CREATE GRAFT ARTERIOVENOUS UPPER EXTREMITY;  Left Upper Arm Arteriovenous Graft Creation, Anesthesia Block ;  Surgeon: Cassie Cardenas MD;  Location: [facility]     [facility]STOSCOPY  09    [facility]     ENDOSCOPY  2000    [facility]     HC COLONOSCOPY THRU STOMA, DIAGNOSTIC  10/7/04    poor prep, repeat in 2-3 years     HC COLONOSCOPY W/WO BRUSH/WASH  10/31/07    Repeat-poor quality prep     HC REMOVAL OF OVARY/TUBE(S)      Salpingo-Oophorectomy, Unilateral     HC REVISE MEDIAN N/CARPAL TUNNEL SURG      Carpal tunnel release     HC UGI ENDOSCOPY DIAG W BIOPSY  10/31/07     HC UGI ENDOSCOPY, SIMPLE EXAM  12/3/2007     LAPAROTOMY, LYSIS ADHESIONS, COMBINED N/A 10/24/2017    Procedure: COMBINED LAPAROTOMY, LYSIS ADHESIONS;  REPAIR FOCAL ILEAL SMALL BOWEL PERFERATION, LYSIS OF ADHESIONS.;  Surgeon: Rashard Zafar MD;  Location:  OR     OPEN REDUCTION INTERNAL FIXATION HIP NAILING Left 7/3/2016    Procedure: OPEN REDUCTION INTERNAL FIXATION HIP NAILING;  Surgeon: Lake Schulz MD;  Location:  OR       Family History:    Family History   Problem Relation Age of Onset     DIABETES Father      AODM     Neurologic Disorder Father      Parkinson's     Blood Disease Father       from blood clot from leg to  lung immediately after hip fracture     DIABETES Paternal Grandmother      adult onset     DIABETES Maternal Grandmother      adult onset     Hypertension No family hx of      CEREBROVASCULAR DISEASE No family hx of        Social History:  Marital Status:   [2]  Social History   Substance Use Topics     Smoking status: Former Smoker     Years: 10.00     Quit date: 1/1/1969     Smokeless tobacco: Never Used     Alcohol use 0.0 oz/week     0 Standard drinks or equivalent per week      Comment: 1/2 a beer once a month        Medications:      cholecalciferol (VITAMIN D3) 5000 UNITS TABS tablet   HYDROcodone-acetaminophen (NORCO) 5-325 MG per tablet   warfarin (COUMADIN) 1 MG tablet   pentoxifylline (TRENTAL) 400 MG CR tablet   metoprolol (LOPRESSOR) 50 MG tablet   diltiazem (CARDIZEM CD) 240 MG 24 hr capsule   NEPHROCAPS 1 MG capsule   VITAMIN D, CHOLECALCIFEROL, PO   B Complex-C-Folic Acid (RENAL) 1 MG CAPS   warfarin (COUMADIN) 2 MG tablet   LORazepam (ATIVAN) 0.5 MG tablet   hypromellose-dextran (ARTIFICAL TEARS) SOLN ophthalmic solution   pramipexole (MIRAPEX) 0.125 MG tablet   fluticasone (FLONASE) 50 MCG/ACT spray   ipratropium - albuterol 0.5 mg/2.5 mg/3 mL (DUONEB) 0.5-2.5 (3) MG/3ML neb solution   ACETAMINOPHEN PO   nitroGLYcerin (NITROSTAT) 0.4 MG sublingual tablet   pravastatin (PRAVACHOL) 40 MG tablet   calcium acetate (PHOSLO) 667 MG CAPS capsule   ORDER FOR DME         Review of Systems   All other systems reviewed and are negative.      Physical Exam   BP: 145/81  Pulse: 89  Temp: 96.7  F (35.9  C)  Resp: 20  SpO2: 90 %      Physical Exam   Constitutional: She is oriented to person, place, and time. She appears well-developed and well-nourished. No distress.   HENT:   Head: Normocephalic and atraumatic.   Right Ear: External ear normal.   Left Ear: External ear normal.   Eyes: EOM are normal. Right eye exhibits no discharge. Left eye exhibits no discharge.   Neck: Normal range of motion.    Cardiovascular: Exam reveals no gallop and no friction rub.    Murmur heard.  Tachycardic     Pulmonary/Chest: Effort normal and breath sounds normal.   Abdominal: Soft. She exhibits no distension.   Musculoskeletal: She exhibits edema and tenderness. She exhibits no deformity.   Tenderness over the proximal humerus, right wrist and right hip.  Increased pain with range of motion of any of those joints.  Chronic edema bilateral lower extremities secondary to end-stage renal disease   Neurological: She is alert and oriented to person, place, and time. No cranial nerve deficit.   Skin: Skin is warm and dry. She is not diaphoretic.   Mild erythema of lower extremities   Psychiatric: She has a normal mood and affect. Her behavior is normal.       ED Course     ED Course     Procedures                   Results for orders placed or performed during the hospital encounter of 03/23/18 (from the past 24 hour(s))   CBC with platelets differential   Result Value Ref Range    WBC 10.9 4.0 - 11.0 10e9/L    RBC Count 3.45 (L) 3.8 - 5.2 10e12/L    Hemoglobin 9.6 (L) 11.7 - 15.7 g/dL    Hematocrit 31.4 (L) 35.0 - 47.0 %    MCV 91 78 - 100 fl    MCH 27.8 26.5 - 33.0 pg    MCHC 30.6 (L) 31.5 - 36.5 g/dL    RDW 17.7 (H) 10.0 - 15.0 %    Platelet Count 292 150 - 450 10e9/L    Diff Method Automated Method     % Neutrophils 79.0 %    % Lymphocytes 9.7 %    % Monocytes 8.5 %    % Eosinophils 1.4 %    % Basophils 0.5 %    % Immature Granulocytes 0.9 %    Absolute Neutrophil 8.6 (H) 1.6 - 8.3 10e9/L    Absolute Lymphocytes 1.1 0.8 - 5.3 10e9/L    Absolute Monocytes 0.9 0.0 - 1.3 10e9/L    Absolute Eosinophils 0.2 0.0 - 0.7 10e9/L    Absolute Basophils 0.1 0.0 - 0.2 10e9/L    Abs Immature Granulocytes 0.1 0 - 0.4 10e9/L   Basic metabolic panel   Result Value Ref Range    Sodium 143 133 - 144 mmol/L    Potassium 3.2 (L) 3.4 - 5.3 mmol/L    Chloride 108 94 - 109 mmol/L    Carbon Dioxide 23 20 - 32 mmol/L    Anion Gap 12 3 - 14 mmol/L     Glucose 142 (H) 70 - 99 mg/dL    Urea Nitrogen 42 (H) 7 - 30 mg/dL    Creatinine 3.68 (H) 0.52 - 1.04 mg/dL    GFR Estimate 12 (L) >60 mL/min/1.7m2    GFR Estimate If Black 15 (L) >60 mL/min/1.7m2    Calcium 6.9 (L) 8.5 - 10.1 mg/dL   INR   Result Value Ref Range    INR 1.46 (H) 0.86 - 1.14   Humerus XR, G/E 2 views, right    Narrative    XR HUMERUS RT G/E 2 VW  3/23/2018 6:41 AM      HISTORY: Fall and pain.     COMPARISON: None.      Impression    IMPRESSION: No acute fracture or dislocation.   XR Hip Right G/E 2 Views    Narrative    XR HIP RIGHT 2-3 VIEWS  3/23/2018 6:42 AM      HISTORY: Fall and hip pain.      COMPARISON: None.      Impression    IMPRESSION: Probable nondisplaced fractures of the right superior and  inferior pubic rami. No other acute fracture or dislocation. Old  fractures of the left hip and pelvis. Mild osteoarthritis in the right  hip.   Wrist XR, G/E 3 views, right    Narrative    XR WRIST RT G/E 3 VW  3/23/2018 6:42 AM     HISTORY: Fall, wrist pain.     COMPARISON: None.       Impression    IMPRESSION: No acute fracture or dislocation. Arthritis at the thumb  carpometacarpal joint.     *Note: Due to a large number of results and/or encounters for the requested time period, some results have not been displayed. A complete set of results can be found in Results Review.       Medications - No data to display    X-rays reveal fracture of pubi rami, pt unable to care for self will need admit and nursing home care.  bc of need for dialysis she cannot be admitted here so will transfer to [facility] per patient preference.   D/w hospitalist Dr. Pulido who accepts the patient in transfer.      Assessments & Plan (with Medical Decision Making)     I have reviewed the nursing notes.    I have reviewed the findings, diagnosis, plan and need for follow up with the patient.  A: Fall with pubic  Rami fracture  Plan: transfer to [facility] to Dr. Pulido    New Prescriptions    No medications on file        Final diagnoses:   Contusion of right hip, initial encounter   Wrist sprain, right, initial encounter   Shoulder strain, right, initial encounter   Closed fracture of ramus of right pubis, initial encounter (H)       3/23/2018   [facility] EMERGENCY DEPARTMENT     Mike Mcneal MD  03/23/18 0711       Mike Mcneal MD  03/23/18 0715

## 2018-03-23 NOTE — PROGRESS NOTES
Clinic Care Coordination Contact  Care Team Conversations    [facility] home care called today and update writer that she will be discharging pt from their services.  Pt has chosen not to pay for her drug coverage insurance and is not taking her medications as prescribed despite many attempts to educate pt and .  RN did file a [facility].  RN is concerns for her safety of not taking her medications and at risk physically.  Of note, RN was reviewing the chart and it appears pt was seen in the ED and then transferred to [facility] for a pelvic fracture.  [facility] will close pt to services and RN CC will monitor for pt's disposition from [facility].     Meenakshi REYNAGA, RN, PHN  Care Coordination    [facility]  [address] [place] [address]  Office: [phone]  Fax [phone]   [facility]  [address] [place] [address]  Office: [phone] Fax [phone]  [email or web address]   [email or web address]   Connect with [facility] on social media.

## 2018-03-23 NOTE — ED NOTES
Fell in bathroom and was on floor for 1/2 hour before getting help, pain in right hip and wrist, no loc and did not hit head

## 2018-03-24 ENCOUNTER — TELEPHONE (OUTPATIENT)
Dept: EMERGENCY MEDICINE | Facility: CLINIC | Age: 78
End: 2018-03-24

## 2018-03-26 LAB
BACTERIA SPEC CULT: ABNORMAL
Lab: ABNORMAL
SPECIMEN SOURCE: ABNORMAL

## 2018-03-28 ENCOUNTER — TELEPHONE (OUTPATIENT)
Dept: INTERNAL MEDICINE | Facility: CLINIC | Age: 78
End: 2018-03-28

## 2018-03-28 NOTE — TELEPHONE ENCOUNTER
Forms received from  Home Care   on 3/28/19 for plan of care..  Forms with RN to review medications.  Orders pended for provider to sign.    Forms given to PCP for signature on .

## 2018-03-29 ENCOUNTER — MEDICAL CORRESPONDENCE (OUTPATIENT)
Dept: HEALTH INFORMATION MANAGEMENT | Facility: CLINIC | Age: 78
End: 2018-03-29

## 2018-04-01 ENCOUNTER — TELEPHONE (OUTPATIENT)
Dept: GERIATRICS | Facility: CLINIC | Age: 78
End: 2018-04-01

## 2018-04-01 NOTE — TELEPHONE ENCOUNTER
Patient on Coumadin due to a fib  INR 3.2 today  Last INR on 3/30 2.3 and she took 2.5 mg PO daily since then    Hold Coumadin today.   INR tomorrow    Electronically signed by RHODA Vazquez GNP

## 2018-04-02 NOTE — PROGRESS NOTES
[facility]  PRIMARY CARE PROVIDER AND CLINIC:  Dheeraj Jj [address] [facility] [address]  Chief Complaint   Patient presents with     Hospital F/U       HPI:    Kathryn Banks is a 78 year old  (1940),admitted to the [facility]  from Emergency Room  [facility].  Hospital stay 3/23/18.  Admitted to this facility for  rehab, medical management and nursing care.  HPI information obtained from: facility chart records, facility staff, patient report, [facility] chart review and Care Everywhere [facility] chart review.     [facility] SUMMARY: Kathryn Banks is a 78 year old female on dialysis and Coumadin for atrial fibrillation who presents the emergency department complaining of right wrist, right shoulder and right hip pain after a fall.  She has end-stage renal disease and is on hemodialysis and occasionally gets some dizziness.  This morning while trying to move about in the kitchen she became dizzy and fell to the ground.  She was on the ground for about a half hour before her significant other found her.  She did not strike her head.  She was transferred to [facility] and treated for a pelvic fracture as well as a paracentesis for pleural effusion.    Current issues are:         Closed nondisplaced fracture of pelvis with routine healing, unspecified part of pelvis, subsequent encounter  ESRD (end stage renal disease) on dialysis (H)  ANABEL on CPAP  Paroxysmal atrial fibrillation (H)  Hemodialysis-associated hypotension  Diarrhea, unspecified type    See assessment / plan for HPI     CODE STATUS/ADVANCE DIRECTIVES DISCUSSION:   CPR/Full code   Patient's living condition: lives with spouse    ALLERGIES:Ciprofloxacin; Oxycodone; Lisinopril; Sulfa drugs; and Penicillins  PAST MEDICAL HISTORY:  has a past medical history of Carpal tunnel syndrome; Coronary atherosclerosis of native coronary artery (2006); OBSTRUCTIVE SLEEP APNEA;  Osteoarthrosis, unspecified whether generalized or localized, unspecified site; Other and combined forms of senile cataract (2000); Other and unspecified hyperlipidemia; RESTLESS LEGS SYNDROME; TENOSYNOV HAND/WRIST NEC (6/30/2006); Type II or unspecified type diabetes mellitus without mention of complication, not stated as uncontrolled (2001); Typical atrial flutter (H) (01/17/2007); and Unspecified essential hypertension.  PAST SURGICAL HISTORY:  has a past surgical history that includes TOTAL ABDOM HYSTERECTOMY (1995); REMOVAL OF OVARY/TUBE(S); APPENDECTOMY; REVISE MEDIAN N/CARPAL TUNNEL SURG; SOTO W/O FACETEC FORAMOT/DSKC 1/2 VRT SEG, LUMBAR (1968); VAGOTOMY/PYLOROPLASTY,SELECT (1970); REMV CATARACT INTRACAP,INSERT LENS; COLONOSCOPY THRU STOMA, DIAGNOSTIC (10/7/04); endoscopy (03/21/2000); Colonoscopy w/wo [place] **Performed** (10/31/07); UGI ENDOSCOPY DIAG W BIOPSY (10/31/07); colonoscopy (12/3/2007); UPPER GI ENDOSCOPY,EXAM (12/3/2007); cystoscopy (11/25/09); angioplasty; CABG, VEIN, FIVE (12/06); Colonoscopy (1/17/2014); Open reduction internal fixation hip nailing (Left, 7/3/2016); Laparotomy, lysis adhesions, combined (N/A, 10/24/2017); and Create Graft Arteriovenous Upper Extremi (Left, 1/9/2018).  FAMILY HISTORY: family history includes Blood Disease in her father; DIABETES in her father, maternal grandmother, and paternal grandmother; Neurologic Disorder in her father. There is no history of Hypertension or CEREBROVASCULAR DISEASE.  SOCIAL HISTORY:  reports that she quit smoking about 49 years ago. She quit after 10.00 years of use. She has never used smokeless tobacco. She reports that she drinks alcohol. She reports that she does not use illicit drugs.    Post Discharge Medication Reconciliation Status: discharge medications reconciled, continue medications without change.  Current Outpatient Prescriptions   Medication Sig Dispense Refill     Warfarin Sodium (COUMADIN PO) Take by mouth daily Take as  directed per INR results       cholecalciferol (VITAMIN D3) 5000 UNITS TABS tablet Take 1 tablet (5,000 Units) by mouth daily 90 tablet 3     pentoxifylline (TRENTAL) 400 MG CR tablet Take 1 tablet (400 mg) by mouth daily 90 tablet 0     metoprolol (LOPRESSOR) 50 MG tablet Take 1 tablet (50 mg) by mouth 2 times daily 60 tablet 1     diltiazem (CARDIZEM CD) 240 MG 24 hr capsule Take 1 capsule (240 mg) by mouth daily 90 capsule 0     B Complex-C-Folic Acid (RENAL) 1 MG CAPS Take 1 capsule by mouth daily       LORazepam (ATIVAN) 0.5 MG tablet Take 0.5 tablets (0.25 mg) by mouth every 8 hours as needed for anxiety 20 tablet 0     pramipexole (MIRAPEX) 0.125 MG tablet Take 1 tablet (0.125 mg) by mouth 3 times daily 90 tablet 3     ipratropium - albuterol 0.5 mg/2.5 mg/3 mL (DUONEB) 0.5-2.5 (3) MG/3ML neb solution Take 1 vial by nebulization every 6 hours as needed        ACETAMINOPHEN PO Take 650 mg by mouth every 6 hours as needed for pain For pain 1-5 out of 10        nitroGLYcerin (NITROSTAT) 0.4 MG sublingual tablet Place 1 tablet (0.4 mg) under the tongue every 5 minutes as needed for chest pain 25 tablet 0     pravastatin (PRAVACHOL) 40 MG tablet Take 1 tablet (40 mg) by mouth daily 90 tablet 3     calcium acetate (PHOSLO) 667 MG CAPS capsule Take 1 capsule (667 mg) by mouth 3 times daily (with meals) 180 capsule      ORDER FOR DME Equipment being ordered: cpap machine, mask, humidifier, tubing and filters 1 Device 0       ROS:  10 point ROS of systems including Constitutional, Eyes, Respiratory, Cardiovascular, Gastroenterology, Genitourinary, Integumentary, Muscularskeletal, Psychiatric were all negative except for pertinent positives noted.    Exam:  BP (!) 88/48  Pulse 89  Temp 98.6  F (37  C)  Resp 18  Wt 140 lb (63.5 kg)  SpO2 97%  BMI 24.03 kg/m2  GENERAL APPEARANCE:  Alert, in no distress  RESP:  respiratory effort and palpation of chest normal, no respiratory distress, lungs sounds clear  CV:   Palpation and auscultation of heart done , regular rate and rhythm, no murmur, rub, or gallop, edema +1pitting bilatera LE  ABDOMEN:  normal bowel sounds, soft, nontender, no hepatosplenomegaly or other masses  M/S:  Gait and station gait steady with walker, no tenderness or swelling of the joints   SKIN:  Inspection and palpation of skin and subcutaneous tissue at baseline  PSYCH:  insight and judgement, memory intact, affect and mood normal     Lab/Diagnostic data: Hospital labs reviewed.    ASSESSMENT/PLAN:  Closed nondisplaced fracture of pelvis with routine healing, unspecified part of pelvis, subsequent encounter  Fell while at home. She reports that she had just completed home care Physical Therapy after a fall.  - pain managed with tylenol PRN  - will schedule tylenol    ESRD (end stage renal disease) on dialysis (H)  On dialysis MWF, has new fistula that will be ready on Friday, and she will get her central access discontinued then too.   - continues on fluid restriction 1500 ml  - continues on phoslo with meals    ANABEL on CPAP  continue cpap at night.    Paroxysmal atrial fibrillation (H)  On warfarin. INR yesterday was 3.9 and dose was held - will have an INR check tomorrow. Rate controlled with     Hemodialysis-associated hypotension  Some low b/ps noted after dialysis. Currently on metoprolol and diltiazem.   From chart notes looks like diltiazem was started because patient was unable to afford amiodarone.   - decrease diltiazem and rehceck on Friday.     Anxiety  Patient takes occasionally for anxiety related to dialysis. She does not use much but would like to have it available when needed. Informed patient of the increased risks of falls and dizziness. She understands and would like to continue medication as ordered.  - continue lorazepam prn x 2 weeks from admit per facility policy     Diarrhea, unspecified type  C/o diarrhea after meals, present for a about a month, no fever or abd pain.   - ok to  treat with imodium    Orders:  1. Imodium 2 mg po three times per week prior to dialyis  2. Imodium 2 mg po qid prn prior to meals for prevention of diarrhea  3. Tylenol 1,000 mg po TID for pelvic fracutre  4. Decrease diltiazem to 120 mg po qd - hold if sbp < 90 and hold on dialysis days.    Total time spent with patient visit was 35 min including patient visit and review of past records. Greater than 50% of total time spent with counseling and coordinating care due to dx.      Electronically signed by:  Hortensia Alcala NP

## 2018-04-03 ENCOUNTER — NURSING HOME VISIT (OUTPATIENT)
Dept: GERIATRICS | Facility: CLINIC | Age: 78
End: 2018-04-03
Payer: COMMERCIAL

## 2018-04-03 VITALS
BODY MASS INDEX: 24.03 KG/M2 | DIASTOLIC BLOOD PRESSURE: 48 MMHG | HEART RATE: 89 BPM | OXYGEN SATURATION: 97 % | TEMPERATURE: 98.6 F | SYSTOLIC BLOOD PRESSURE: 88 MMHG | RESPIRATION RATE: 18 BRPM | WEIGHT: 140 LBS

## 2018-04-03 DIAGNOSIS — I48.0 PAROXYSMAL ATRIAL FIBRILLATION (H): ICD-10-CM

## 2018-04-03 DIAGNOSIS — G47.33 OSA ON CPAP: ICD-10-CM

## 2018-04-03 DIAGNOSIS — Z99.2 ESRD (END STAGE RENAL DISEASE) ON DIALYSIS (H): ICD-10-CM

## 2018-04-03 DIAGNOSIS — I95.3 HEMODIALYSIS-ASSOCIATED HYPOTENSION: ICD-10-CM

## 2018-04-03 DIAGNOSIS — R19.7 DIARRHEA, UNSPECIFIED TYPE: ICD-10-CM

## 2018-04-03 DIAGNOSIS — N18.6 ESRD (END STAGE RENAL DISEASE) ON DIALYSIS (H): ICD-10-CM

## 2018-04-03 DIAGNOSIS — S32.9XXD CLOSED NONDISPLACED FRACTURE OF PELVIS WITH ROUTINE HEALING, UNSPECIFIED PART OF PELVIS, SUBSEQUENT ENCOUNTER: Primary | ICD-10-CM

## 2018-04-03 PROBLEM — S32.509D CLOSED NONDISPLACED FRACTURE OF PUBIS WITH ROUTINE HEALING: Status: ACTIVE | Noted: 2018-04-03

## 2018-04-03 PROBLEM — S32.509D CLOSED NONDISPLACED FRACTURE OF PUBIS WITH ROUTINE HEALING: Status: RESOLVED | Noted: 2018-04-03 | Resolved: 2018-04-03

## 2018-04-03 PROCEDURE — 99310 SBSQ NF CARE HIGH MDM 45: CPT | Performed by: NURSE PRACTITIONER

## 2018-04-04 ENCOUNTER — PATIENT OUTREACH (OUTPATIENT)
Dept: CARE COORDINATION | Facility: CLINIC | Age: 78
End: 2018-04-04

## 2018-04-04 NOTE — PROGRESS NOTES
Clinic Care Coordination Contact  Care Coordination Transition Communication    Clinical Data: Patient was hospitalized at [facility] (unable to obtain records from Care Everywhere need pt auth).  According to NH admission notes pt was treated for a pelvic fracture and pleural effusion, then transferred to NH on 4/3.    Transition to Facility:              Facility Name: [facility]              Contact name and phone number/fax: Carlos Eduardo  [phone]    Plan: RN/SW Care Coordinator will await notification from facility staff informing RN/SW Care Coordinator of patient's discharge plans/needs. RN/SW Care Coordinator will review chart and outreach to facility staff every 4 weeks and as needed.     Meenakshi FERGUSONN, RN, PHN  Care Coordination    [facility]  [address] [place] [address]  Office: [phone]  Fax [phone]   [facility]  [address] [place] [address]  Office: [phone] Fax [phone]  [email or web address]   [email or web address]   Connect with [facility] on social media.

## 2018-04-11 ENCOUNTER — NURSING HOME VISIT (OUTPATIENT)
Dept: FAMILY MEDICINE | Facility: OTHER | Age: 78
End: 2018-04-11
Payer: COMMERCIAL

## 2018-04-11 VITALS
TEMPERATURE: 98.4 F | SYSTOLIC BLOOD PRESSURE: 139 MMHG | HEART RATE: 88 BPM | BODY MASS INDEX: 24.34 KG/M2 | RESPIRATION RATE: 16 BRPM | OXYGEN SATURATION: 95 % | DIASTOLIC BLOOD PRESSURE: 76 MMHG | WEIGHT: 141.8 LBS

## 2018-04-11 DIAGNOSIS — Z53.9 ERRONEOUS ENCOUNTER--DISREGARD: Primary | ICD-10-CM

## 2018-04-11 RX ORDER — AMOXICILLIN 250 MG
1-2 CAPSULE ORAL 2 TIMES DAILY PRN
COMMUNITY
End: 2019-01-01

## 2018-04-11 RX ORDER — LOPERAMIDE HCL 2 MG
2 CAPSULE ORAL DAILY
COMMUNITY
End: 2018-04-24

## 2018-04-11 RX ORDER — LOPERAMIDE HCL 2 MG
2 CAPSULE ORAL EVERY 6 HOURS PRN
COMMUNITY
End: 2018-05-21

## 2018-04-11 NOTE — PROGRESS NOTES
Patient not seen today as had already left for dialysis        This encounter was opened in error. Please disregard.

## 2018-04-11 NOTE — MR AVS SNAPSHOT
After Visit Summary   4/11/2018    Kathryn Banks    MRN: [id number]           Patient Information     Date Of Birth          1940        Visit Information        Provider Department      4/11/2018 10:40 AM Tala Cisneros MD [facility] [place]        Today's Diagnoses     ERRONEOUS ENCOUNTER--DISREGARD    -  1       Follow-ups after your visit        Who to contact     If you have questions or need follow up information about today's clinic visit or your schedule please contact [facility] directly at [phone].  Normal or non-critical lab and imaging results will be communicated to you by [employer], letter or phone within 4 business days after the clinic has received the results. If you do not hear from us within 7 days, please contact the clinic through [employer] or phone. If you have a critical or abnormal lab result, we will notify you by phone as soon as possible.  Submit refill requests through [employer] or call your pharmacy and they will forward the refill request to us. Please allow 3 business days for your refill to be completed.          Additional Information About Your Visit        MyChart Information     [employer] gives you secure access to your electronic health record. If you see a primary care provider, you can also send messages to your care team and make appointments. If you have questions, please call your primary care clinic.  If you do not have a primary care provider, please call [phone] and they will assist you.        Care EveryWhere ID     This is your Care EveryWhere ID. This could be used by other organizations to access your [place] medical records  [id number]        Your Vitals Were     Pulse Temperature Respirations Pulse Oximetry BMI (Body Mass Index)       88 98.4  F (36.9  C) 16 95% 24.34 kg/m2        Blood Pressure from Last 3 Encounters:   04/11/18 139/76   04/03/18 (!) 88/48   03/23/18 142/84    Weight from Last 3 Encounters:    04/11/18 141 lb 12.8 oz (64.3 kg)   04/03/18 140 lb (63.5 kg)   01/29/18 150 lb (68 kg)              Today, you had the following     No orders found for display       Primary Care Provider Office Phone # Fax #    Dheeraj Jj [phone]       [address] [facility] [address]        Equal Access to Services     [facility] : Hadii aad ku hadasho Soomaali, waaxda luqadaha, qaybta kaalmada adeegyada, waxay idiin hayaan adeeg kharash la'aan ah. So [facility] [phone].    ATENCIÓN: Si arturo garcia, tiene a medrano disposición servicios gratuitos de asistencia lingüística. Llame al [phone].    We comply with applicable federal civil rights laws and [place] laws. We do not discriminate on the basis of race, color, national origin, age, disability, sex, sexual orientation, or gender identity.            Thank you!     Thank you for choosing [facility]  for your care. Our goal is always to provide you with excellent care. Hearing back from our patients is one way we can continue to improve our services. Please take a few minutes to complete the written survey that you may receive in the mail after your visit with us. Thank you!             Your Updated Medication List - Protect others around you: Learn how to safely use, store and throw away your medicines at www.disposemymeds.org.          This list is accurate as of 4/11/18 12:13 PM.  Always use your most recent med list.                   Brand Name Dispense Instructions for use Diagnosis    ACETAMINOPHEN PO      Take 1,000 mg by mouth 3 times daily And give 500 mg PO every 12 hours as needed        * calcium acetate 667 MG Caps capsule    PHOSLO     Take 1,334 mg by mouth 3 times daily (with meals)        * calcium acetate 667 MG Caps capsule    PHOSLO     Take 667 mg by mouth 2 times daily Give with snack        cholecalciferol 5000 units Tabs tablet    vitamin D3    90 tablet    Take 1 tablet (5,000 Units) by mouth daily     Vitamin D deficiency       COUMADIN PO      Take by mouth daily Take as directed per INR results        ipratropium - albuterol 0.5 mg/2.5 mg/3 mL 0.5-2.5 (3) MG/3ML neb solution    DUONEB     Take 1 vial by nebulization every 6 hours as needed        * loperamide 2 MG capsule    IMODIUM     Take 2 mg by mouth daily Every Monday, Wednesday and Friday prior to dialysis        * loperamide 2 MG capsule    IMODIUM     Take 2 mg by mouth every 6 hours as needed for diarrhea        LORazepam 0.5 MG tablet    ATIVAN    20 tablet    Take 0.5 tablets (0.25 mg) by mouth every 8 hours as needed for anxiety    Anxiety       nitroGLYcerin 0.4 MG sublingual tablet    NITROSTAT    25 tablet    Place 1 tablet (0.4 mg) under the tongue every 5 minutes as needed for chest pain    Hx of non-ST elevation myocardial infarction (NSTEMI), Atherosclerosis of native coronary artery of native heart without angina pectoris, Postsurgical aortocoronary bypass status       order for DME     1 Device    Equipment being ordered: cpap machine, mask, humidifier, tubing and filters    ANABEL (obstructive sleep apnea)       pentoxifylline 400 MG CR tablet    TRENtal    90 tablet    Take 1 tablet (400 mg) by mouth daily    PAD (peripheral artery disease) (H)       pramipexole 0.125 MG tablet    MIRAPEX    90 tablet    Take 1 tablet (0.125 mg) by mouth 3 times daily    Restless leg syndrome       pravastatin 40 MG tablet    PRAVACHOL    90 tablet    Take 1 tablet (40 mg) by mouth daily    Hx of non-ST elevation myocardial infarction (NSTEMI), Atherosclerosis of native coronary artery of native heart without angina pectoris, Type 2 diabetes mellitus with other circulatory complications       RENAL 1 MG Caps      Take 1 capsule by mouth daily        senna-docusate 8.6-50 MG per tablet    SENOKOT-S;PERICOLACE     Take 2 tablets by mouth 2 times daily as needed for constipation        * Notice:  This list has 4 medication(s) that are the same as other  medications prescribed for you. Read the directions carefully, and ask your doctor or other care provider to review them with you.

## 2018-04-17 ENCOUNTER — DISCHARGE SUMMARY NURSING HOME (OUTPATIENT)
Dept: GERIATRICS | Facility: CLINIC | Age: 78
End: 2018-04-17
Payer: COMMERCIAL

## 2018-04-17 VITALS
TEMPERATURE: 100.3 F | WEIGHT: 142 LBS | RESPIRATION RATE: 24 BRPM | SYSTOLIC BLOOD PRESSURE: 126 MMHG | DIASTOLIC BLOOD PRESSURE: 76 MMHG | BODY MASS INDEX: 24.37 KG/M2 | HEART RATE: 120 BPM | OXYGEN SATURATION: 95 %

## 2018-04-17 DIAGNOSIS — Z99.2 ESRD (END STAGE RENAL DISEASE) ON DIALYSIS (H): ICD-10-CM

## 2018-04-17 DIAGNOSIS — G47.33 OSA ON CPAP: ICD-10-CM

## 2018-04-17 DIAGNOSIS — I48.0 PAROXYSMAL ATRIAL FIBRILLATION (H): ICD-10-CM

## 2018-04-17 DIAGNOSIS — N18.6 ESRD (END STAGE RENAL DISEASE) ON DIALYSIS (H): ICD-10-CM

## 2018-04-17 DIAGNOSIS — R19.7 DIARRHEA, UNSPECIFIED TYPE: ICD-10-CM

## 2018-04-17 DIAGNOSIS — I95.3 HEMODIALYSIS-ASSOCIATED HYPOTENSION: ICD-10-CM

## 2018-04-17 DIAGNOSIS — S32.9XXD CLOSED NONDISPLACED FRACTURE OF PELVIS WITH ROUTINE HEALING, UNSPECIFIED PART OF PELVIS, SUBSEQUENT ENCOUNTER: Primary | ICD-10-CM

## 2018-04-17 PROCEDURE — 99316 NF DSCHRG MGMT 30 MIN+: CPT | Performed by: NURSE PRACTITIONER

## 2018-04-17 NOTE — PROGRESS NOTES
[facility] DISCHARGE SUMMARY    PATIENT'S NAME: Kathryn Bansk  YOB: 1940  MEDICAL RECORD NUMBER:  [id number]    PRIMARY CARE PROVIDER AND CLINIC RESPONSIBLE AFTER TRANSFER: Dheeraj Jj [address] [facility] / [facility] [address]     CODE STATUS/ADVANCE DIRECTIVES DISCUSSION:   CPR/Full code        Allergies   Allergen Reactions     Ciprofloxacin Nausea and Vomiting     Zofran did not help     Oxycodone Visual Disturbance     Delusions, blackouts      Lisinopril Cough     Sulfa Drugs GI Disturbance     LOSS OF TASTE     Tape [Adhesive Tape]      Penicillins Other (See Comments)     Swelling and reddness at injection site, pt wanted it on        TRANSFERRING PROVIDERS: Hortensia Alcala NP, Tala Cisneros MD  DATE OF SNF ADMISSION:  March / 29 / 2018  DATE OF SNF (anticipated) DISCHARGE: April / 21 / 2018  DISCHARGE DISPOSITION: FM Provider   Nursing Facility: [facility]    Emergency Room  [facility] stay 3/23/18     Condition on Discharge:  Stable.  Function:  Lives in house with .  Feeding: ind  Dressing: help given with lower body  Ambulation: standby assist  Toileting: ind  Bathing: minimal assistance  Medication Management: ind  Cognitive Scores: BIMS 13/15    Equipment: walker    DISCHARGE DIAGNOSIS:   1. Closed nondisplaced fracture of pelvis with routine healing, unspecified part of pelvis, subsequent encounter    2. ESRD (end stage renal disease) on dialysis (H)    3. ANABEL on CPAP    4. Paroxysmal atrial fibrillation (H)    5. Hemodialysis-associated hypotension    6. Diarrhea, unspecified type        HPI Nursing Facility Course:  HPI information obtained from: facility chart records and facility staff.  Closed nondisplaced fracture of pelvis with routine healing, unspecified part of pelvis, subsequent encounter  Admitted to the tcu for therapy after a fall with pelvic fracutre. Plans to discharge home this weekend.    ESRD  (end stage renal disease) on dialysis (H)  Required extra dialysis run today. Usual days MWF. Has been using fistula. Does not follow fluid restriction of 1500 ml.     ANABEL on CPAP  No changes     Paroxysmal atrial fibrillation (H) / / Hemodialysis-associated hypotension  Tachycardia with heart rates 100 - 120. Diltiazem and metoprolol were discontinued by dr. Dhillon and yesterday the metoprolol was restarted.  - continue to follow with renal and PCP  - current dose of warfarin (warfarin 2.5 mg on Monday Wednesday Friday and 1.25 mg sat, sun, tues, thurs)      Diarrhea, unspecified type  Prn imodium added.       PAST MEDICAL HISTORY:  has a past medical history of Carpal tunnel syndrome; Coronary atherosclerosis of native coronary artery (2006); OBSTRUCTIVE SLEEP APNEA; Osteoarthrosis, unspecified whether generalized or localized, unspecified site; Other and combined forms of senile cataract (2000); Other and unspecified hyperlipidemia; RESTLESS LEGS SYNDROME; TENOSYNOV HAND/WRIST NEC (6/30/2006); Type II or unspecified type diabetes mellitus without mention of complication, not stated as uncontrolled (2001); Typical atrial flutter (H) (01/17/2007); and Unspecified essential hypertension.    DISCHARGE MEDICATIONS:  Current Outpatient Prescriptions   Medication Sig Dispense Refill     ACETAMINOPHEN PO Take 1,000 mg by mouth 3 times daily And give 500 mg PO every 12 hours as needed       B Complex-C-Folic Acid (RENAL) 1 MG CAPS Take 1 capsule by mouth daily       calcium acetate (PHOSLO) 667 MG CAPS capsule Take 667 mg by mouth 3 times daily (with meals)        calcium acetate (PHOSLO) 667 MG CAPS capsule Take 667 mg by mouth 2 times daily Give with snack       cholecalciferol (VITAMIN D3) 5000 UNITS TABS tablet Take 1 tablet (5,000 Units) by mouth daily 90 tablet 3     ipratropium - albuterol 0.5 mg/2.5 mg/3 mL (DUONEB) 0.5-2.5 (3) MG/3ML neb solution Take 1 vial by nebulization every 6 hours as needed         loperamide (IMODIUM) 2 MG capsule Take 2 mg by mouth daily Every Monday, Wednesday and Friday prior to dialysis       loperamide (IMODIUM) 2 MG capsule Take 2 mg by mouth every 6 hours as needed for diarrhea       METOPROLOL TARTRATE PO Take 12.5 mg by mouth 2 times daily       nitroGLYcerin (NITROSTAT) 0.4 MG sublingual tablet Place 1 tablet (0.4 mg) under the tongue every 5 minutes as needed for chest pain 25 tablet 0     ORDER FOR DME Equipment being ordered: cpap machine, mask, humidifier, tubing and filters 1 Device 0     pentoxifylline (TRENTAL) 400 MG CR tablet Take 1 tablet (400 mg) by mouth daily 90 tablet 0     pramipexole (MIRAPEX) 0.125 MG tablet Take 1 tablet (0.125 mg) by mouth 3 times daily 90 tablet 3     pravastatin (PRAVACHOL) 40 MG tablet Take 1 tablet (40 mg) by mouth daily 90 tablet 3     senna-docusate (SENOKOT-S;PERICOLACE) 8.6-50 MG per tablet Take 2 tablets by mouth 2 times daily as needed for constipation       Warfarin Sodium (COUMADIN PO) Take by mouth daily Take as directed per INR results         MEDICATION CHANGES/RATIONALE:   imodim added for diarrhea  diltiamzem and metoprolol discontinued then metoprolol restarted at lower dose     ROS:    4 point ROS including Respiratory, CV, GI and , other than that noted in the HPI,  is negative    Physical Exam:   Vitals: /76  Pulse 120  Temp 100.3  F (37.9  C)  Resp 24  Wt 142 lb (64.4 kg)  SpO2 95%  BMI 24.37 kg/m2  BMI= Body mass index is 24.37 kg/(m^2).    GENERAL APPEARANCE:  Alert, in no distress    DISCHARGE PLAN:  Occupational Therapy, Physical Therapy, Registered Nurse and Home Health Aide  Patient instructed to follow-up with:  PCP in 7 days      Current [place] scheduled appointments:  Future Appointments  Date Time Provider Department Center   4/17/2018 11:00 AM Hortensia Alcala NP [facility]       MTANGELA referral needed and placed by this provider: No    Pending labs: none  SNF labs none  Discharge Treatments:  -  Ok to discharge to home with current medications and treatments  - Home Physical Therapy / Ocupational Therapy / RN / HHA  - Follow up with Primary care provider in 1 week  - Scripts sent to pharmacy: n/a   - Scripts written: n/a    TOTAL DISCHARGE TIME:   Greater than 30 minutes  Electronically signed by:  Hortensia Alcala NP        Documentation of Face-to-Face and Certification for Home Health Services     Patient: Kathryn Banks   YOB: 1940  MR Number: [id number]  Today's Date: 4/23/2018    I certify that patient: Kathryn Banks is under my care and that I, or a nurse practitioner or physician's assistant working with me, had a face-to-face encounter that meets the physician face-to-face encounter requirements with this patient on: 4/17/2018.    This encounter with the patient was in whole, or in part, for the following medical condition, which is the primary reason for home health care: pelvic fracture.    I certify that, based on my findings, the following services are medically necessary home health services: Nursing and Physical Therapy.    My clinical findings support the need for the above services because: Nurse is needed: To assess b/p after changes in medications or other medical regimen.. and Physical Therapy Services are needed to assess and treat the following functional impairments: impaired gait, balance and mobility related to pelvic fracture.    Further, I certify that my clinical findings support that this patient is homebound (i.e. absences from home require considerable and taxing effort and are for medical reasons or [employer] services or infrequently or of short duration when for other reasons) because: Leaving home is medically contraindicated for the following reason(s): unable to ambulate for long distances..    Based on the above findings. I certify that this patient is confined to the home and needs intermittent skilled nursing care, physical therapy and/or speech therapy.   The patient is under my care, and I have initiated the establishment of the plan of care.  This patient will be followed by a physician who will periodically review the plan of care.  Physician/Provider to provide follow up care: Dheeraj Jj    Responsible Medicare certified PECOS Physician: Tala Cisneros MD  Physician Signature: See electronic signature associated with these discharge orders.  Date: 4/23/2018

## 2018-04-17 NOTE — LETTER
4/17/2018        RE: Kathryn Banks  [address] [place] [address]          [place] [facility] DISCHARGE SUMMARY    PATIENT'S NAME: Kathryn Banks  YOB: 1940  MEDICAL RECORD NUMBER:  [id number]    PRIMARY CARE PROVIDER AND CLINIC RESPONSIBLE AFTER TRANSFER: Dheeraj Jj [address] [facility] [address]     CODE STATUS/ADVANCE DIRECTIVES DISCUSSION:   CPR/Full code        Allergies   Allergen Reactions     Ciprofloxacin Nausea and Vomiting     Zofran did not help     Oxycodone Visual Disturbance     Delusions, blackouts      Lisinopril Cough     Sulfa Drugs GI Disturbance     LOSS OF TASTE     Tape [Adhesive Tape]      Penicillins Other (See Comments)     Swelling and reddness at injection site, pt wanted it on        TRANSFERRING PROVIDERS: Hortensia Alcala NP, Tala Cisneros MD  DATE OF SNF ADMISSION:  March / 29 / 2018  DATE OF SNF (anticipated) DISCHARGE: April / 21 / 2018  DISCHARGE DISPOSITION: FMG Provider   Nursing Facility: [facility]    Emergency Room  [facility] stay 3/23/18     Condition on Discharge:  Stable.  Function:  Lives in house with .  Feeding: ind  Dressing: help given with lower body  Ambulation: standby assist  Toileting: ind  Bathing: minimal assistance  Medication Management: ind  Cognitive Scores: BIMS 13/15    Equipment: walker    DISCHARGE DIAGNOSIS:   1. Closed nondisplaced fracture of pelvis with routine healing, unspecified part of pelvis, subsequent encounter    2. ESRD (end stage renal disease) on dialysis (H)    3. ANABEL on CPAP    4. Paroxysmal atrial fibrillation (H)    5. Hemodialysis-associated hypotension    6. Diarrhea, unspecified type        HPI Nursing Facility Course:  HPI information obtained from: facility chart records and facility staff.  Closed nondisplaced fracture of pelvis with routine healing, unspecified part of pelvis, subsequent encounter  Admitted to the tcu for  therapy after a fall with pelvic fracutre. Plans to discharge home this weekend.    ESRD (end stage renal disease) on dialysis (H)  Required extra dialysis run today. Usual days MWF. Has been using fistula. Does not follow fluid restriction of 1500 ml.     ANABEL on CPAP  No changes     Paroxysmal atrial fibrillation (H) / / Hemodialysis-associated hypotension  Tachycardia with heart rates 100 - 120. Diltiazem and metoprolol were discontinued by dr. Dhillon and yesterday the metoprolol was restarted.  - continue to follow with renal and PCP  - current dose of warfarin (warfarin 2.5 mg on Monday Wednesday Friday and 1.25 mg sat, sun, tues, thurs)      Diarrhea, unspecified type  Prn imodium added.       PAST MEDICAL HISTORY:  has a past medical history of Carpal tunnel syndrome; Coronary atherosclerosis of native coronary artery (2006); OBSTRUCTIVE SLEEP APNEA; Osteoarthrosis, unspecified whether generalized or localized, unspecified site; Other and combined forms of senile cataract (2000); Other and unspecified hyperlipidemia; RESTLESS LEGS SYNDROME; TENOSYNOV HAND/WRIST NEC (6/30/2006); Type II or unspecified type diabetes mellitus without mention of complication, not stated as uncontrolled (2001); Typical atrial flutter (H) (01/17/2007); and Unspecified essential hypertension.    DISCHARGE MEDICATIONS:  Current Outpatient Prescriptions   Medication Sig Dispense Refill     ACETAMINOPHEN PO Take 1,000 mg by mouth 3 times daily And give 500 mg PO every 12 hours as needed       B Complex-C-Folic Acid (RENAL) 1 MG CAPS Take 1 capsule by mouth daily       calcium acetate (PHOSLO) 667 MG CAPS capsule Take 667 mg by mouth 3 times daily (with meals)        calcium acetate (PHOSLO) 667 MG CAPS capsule Take 667 mg by mouth 2 times daily Give with snack       cholecalciferol (VITAMIN D3) 5000 UNITS TABS tablet Take 1 tablet (5,000 Units) by mouth daily 90 tablet 3     ipratropium - albuterol 0.5 mg/2.5 mg/3 mL (DUONEB) 0.5-2.5 (3)  MG/3ML neb solution Take 1 vial by nebulization every 6 hours as needed        loperamide (IMODIUM) 2 MG capsule Take 2 mg by mouth daily Every Monday, Wednesday and Friday prior to dialysis       loperamide (IMODIUM) 2 MG capsule Take 2 mg by mouth every 6 hours as needed for diarrhea       METOPROLOL TARTRATE PO Take 12.5 mg by mouth 2 times daily       nitroGLYcerin (NITROSTAT) 0.4 MG sublingual tablet Place 1 tablet (0.4 mg) under the tongue every 5 minutes as needed for chest pain 25 tablet 0     ORDER FOR DME Equipment being ordered: cpap machine, mask, humidifier, tubing and filters 1 Device 0     pentoxifylline (TRENTAL) 400 MG CR tablet Take 1 tablet (400 mg) by mouth daily 90 tablet 0     pramipexole (MIRAPEX) 0.125 MG tablet Take 1 tablet (0.125 mg) by mouth 3 times daily 90 tablet 3     pravastatin (PRAVACHOL) 40 MG tablet Take 1 tablet (40 mg) by mouth daily 90 tablet 3     senna-docusate (SENOKOT-S;PERICOLACE) 8.6-50 MG per tablet Take 2 tablets by mouth 2 times daily as needed for constipation       Warfarin Sodium (COUMADIN PO) Take by mouth daily Take as directed per INR results         MEDICATION CHANGES/RATIONALE:   imodim added for diarrhea  diltiamzem and metoprolol discontinued then metoprolol restarted at lower dose     ROS:    4 point ROS including Respiratory, CV, GI and , other than that noted in the HPI,  is negative    Physical Exam:   Vitals: /76  Pulse 120  Temp 100.3  F (37.9  C)  Resp 24  Wt 142 lb (64.4 kg)  SpO2 95%  BMI 24.37 kg/m2  BMI= Body mass index is 24.37 kg/(m^2).    GENERAL APPEARANCE:  Alert, in no distress    DISCHARGE PLAN:  Occupational Therapy, Physical Therapy, Registered Nurse and Home Health Aide  Patient instructed to follow-up with:  PCP in 7 days      Current [place] scheduled appointments:  Future Appointments  Date Time Provider Department Center   4/17/2018 11:00 AM [facility]       [place] referral needed and placed by  this provider: No    Pending labs: none  SNF labs none  Discharge Treatments:  - Ok to discharge to home with current medications and treatments  - Home Physical Therapy / Ocupational Therapy / RN / HHA  - Follow up with Primary care provider in 1 week  - Scripts sent to pharmacy: n/a   - Scripts written: n/a    TOTAL DISCHARGE TIME:   Greater than 30 minutes  Electronically signed by:  Hortensia Alcala NP        Documentation of Face-to-Face and Certification for Home Health Services     Patient: Katrhyn Banks   YOB: 1940  MR Number: [id number]  Today's Date: 4/23/2018    I certify that patient: Kathryn Banks is under my care and that I, or a nurse practitioner or physician's assistant working with me, had a face-to-face encounter that meets the physician face-to-face encounter requirements with this patient on: 4/17/2018.    This encounter with the patient was in whole, or in part, for the following medical condition, which is the primary reason for home health care: pelvic fracture.    I certify that, based on my findings, the following services are medically necessary home health services: Nursing and Physical Therapy.    My clinical findings support the need for the above services because: Nurse is needed: To assess b/p after changes in medications or other medical regimen.. and Physical Therapy Services are needed to assess and treat the following functional impairments: impaired gait, balance and mobility related to pelvic fracture.    Further, I certify that my clinical findings support that this patient is homebound (i.e. absences from home require considerable and taxing effort and are for medical reasons or [employer] services or infrequently or of short duration when for other reasons) because: Leaving home is medically contraindicated for the following reason(s): unable to ambulate for long distances..    Based on the above findings. I certify that this patient is confined to the home and  needs intermittent skilled nursing care, physical therapy and/or speech therapy.  The patient is under my care, and I have initiated the establishment of the plan of care.  This patient will be followed by a physician who will periodically review the plan of care.  Physician/Provider to provide follow up care: Dheeraj Jj    Responsible Medicare certified PECOS Physician: Tala Cisneros MD  Physician Signature: See electronic signature associated with these discharge orders.  Date: 4/23/2018        Sincerely,        Hortensia Alcala NP

## 2018-04-23 NOTE — PROGRESS NOTES
SUBJECTIVE:   Kathryn Banks is a 78 year old female who presents to clinic today for the following health issues:    Patient suffered a pelvic fracture on 23 March 2018 was subsequently admitted into [facility] and then discharged to a [facility] care facility in [place].  She was admitted there on 29 March and was discharged on 24 April.  She presents today for a follow-up of this and states that she feels like she is almost completely healed.  We discussed the fact that she will need to begin walking on a regular basis and not just short burst during the day to help build her strength, stability, stamina, long-term health.  She now understands that this is most important for her to build strength and balance so that hopefully she does not fall again anytime soon.  Advised that if she continues to rely on the wheelchair in the current state that she is in that she will proceed more towards a nursing home facility faster than if she is more mobile and self-reliant.    [facility]      Hospital Follow-up Visit:    Hospital/Nursing Home/ Rehab Facility: [facility]  Date of Admission: 03/29  Date of Discharge: 04/24  Reason(s) for Admission: As above.            Problems taking medications regularly:  Difficulty remembering       Medication changes since discharge: Changing dosing on Metoprolol       Problems adhering to non-medication therapy:  None    Summary of hospitalization:  [facility] discharge summary reviewed  [facility] documentation reviewed as well.  Diagnostic Tests/Treatments reviewed.  Follow up needed: Labs today.  DEXA scan would be wise.  Other Healthcare Providers Involved in Patient s Care:         None  Update since discharge: improved.     Post Discharge Medication Reconciliation: discharge medications reconciled, continue medications without change.  Plan of care communicated with patient and caregiver     Coding guidelines for this visit:  Type  of Medical   Decision Making Face-to-Face Visit       within 7 Days of discharge Face-to-Face Visit        within 14 days of discharge   Moderate Complexity [id number]   High Complexity 45595 [id number]            Problem list and histories reviewed & adjusted, as indicated.  Additional history: as documented    Patient Active Problem List   Diagnosis     Restless leg syndrome     ANABEL on CPAP     Lipoma of other skin and subcutaneous tissue     Esophageal reflux     History of peptic ulcer disease     Kidney stone     Hyperlipidemia LDL goal <100     Advanced directives, counseling/discussion     CAD - NSTEMI, CABG x 5 in 2006, angio 2013 patent grafts except occluded graft to [facility]     Lymphedema     Osteoarthritis of hip     Essential hypertension with goal blood pressure less than 140/90     Pulmonary hypertension     Chronic heart failure (H) with preserved EF     Atrial flutter (H) - present 6/12     Proteinuria 2.1 g/g Cr     Acute on chronic renal failure -- Dialysis started 10/2017     Memory loss     Microscopic hematuria     Anemia in chronic kidney disease     Paroxysmal atrial fibrillation (H)     Anxiety     SBO -- S/P Lysis of [place]hes 10/24/17     DM type 2 -- Hgb A1C 6.6 on 10/13/17     ESRD (end stage renal disease) on dialysis (H)     Physical deconditioning     Bilateral leg edema     Type 2 diabetes mellitus with complication (H)     Postprocedural hypotension     Morbid obesity (H)     Fall off motorized mobility scooter, initial encounter     Fall     Acute decompensated heart failure (H)     Contusion of right hip, initial encounter     Past Surgical History:   Procedure Laterality Date     ANGIOPLASTY       C APPENDECTOMY       C CABG, VEIN, FIVE  12/06    SVG x 4 and LIMA to LAD     C SOTO W/O FACETEC FORAMOT/DSKC 1/2 VRT SEG, LUMBAR  1968     C REMV CATARACT INTRACAP,INSERT LENS      Bilateral     C TOTAL ABDOM HYSTERECTOMY  1995     - fibroids     C VAGOTOMY/PYLOROPLASTY,SELECT   1970     COLONOSCOPY  12/3/2007     COLONOSCOPY  2014    Procedure: COLONOSCOPY;  Colonoscopy;  Surgeon: Zack Stearns MD;  Location: [facility]     CREATE GRAFT LOOP ARTERIOVENOUS UPPER EXTREMITY Left 2018    Procedure: CREATE GRAFT ARTERIOVENOUS UPPER EXTREMITY;  Left Upper Arm Arteriovenous Graft Creation, Anesthesia Block ;  Surgeon: Cassie Cardenas MD;  Location: [facility]     CYSTOSCOPY  09    [place]     ENDOSCOPY  2000    [facility]     [facility] COLONOSCOPY THRU STOMA, DIAGNOSTIC  10/7/04    poor prep, repeat in 2-3 years     HC COLONOSCOPY W/WO BRUSH/WASH  10/31/07    Repeat-poor quality prep     HC REMOVAL OF OVARY/TUBE(S)      Salpingo-Oophorectomy, Unilateral     HC REVISE MEDIAN N/CARPAL TUNNEL SURG      Carpal tunnel release     HC UGI ENDOSCOPY DIAG W BIOPSY  10/31/07     HC UGI ENDOSCOPY, SIMPLE EXAM  12/3/2007     LAPAROTOMY, LYSIS ADHESIONS, COMBINED N/A 10/24/2017    Procedure: COMBINED LAPAROTOMY, LYSIS ADHESIONS;  REPAIR FOCAL ILEAL SMALL BOWEL PERFERATION, LYSIS OF ADHESIONS.;  Surgeon: Rashard Zafar MD;  Location: [facility]     OPEN REDUCTION INTERNAL FIXATION HIP NAILING Left 7/3/2016    Procedure: OPEN REDUCTION INTERNAL FIXATION HIP NAILING;  Surgeon: Lake Schulz MD;  Location: [facility]       Social History   Substance Use Topics     Smoking status: Former Smoker     Years: 10.00     Quit date: 1969     Smokeless tobacco: Never Used     Alcohol use 0.0 oz/week     0 Standard drinks or equivalent per week      Comment: 1/2 a beer once a month     Family History   Problem Relation Age of Onset     DIABETES Father      AODM     Neurologic Disorder Father      Parkinson's     Blood Disease Father       from blood clot from leg to lung immediately after hip fracture     DIABETES Paternal Grandmother      adult onset     DIABETES Maternal Grandmother      adult onset     Hypertension No family hx of      CEREBROVASCULAR DISEASE No family hx of          Current  Outpatient Prescriptions   Medication Sig Dispense Refill     ACETAMINOPHEN PO Take 1,000 mg by mouth 3 times daily And give 500 mg PO every 12 hours as needed       B Complex-C-Folic Acid (RENAL) 1 MG CAPS Take 1 capsule by mouth daily       calcium acetate (PHOSLO) 667 MG CAPS capsule Take 667 mg by mouth 3 times daily (with meals) And 2 times daily with snacks       cholecalciferol (VITAMIN D3) 5000 UNITS TABS tablet Take 1 tablet (5,000 Units) by mouth daily 90 tablet 3     Dextromethorphan-guaiFENesin  MG/5ML syrup Take 5 mLs by mouth every 4 hours as needed for cough       ipratropium - albuterol 0.5 mg/2.5 mg/3 mL (DUONEB) 0.5-2.5 (3) MG/3ML neb solution Take 1 vial by nebulization every 6 hours as needed        loperamide (IMODIUM) 2 MG capsule Take 2 mg by mouth every 6 hours as needed for diarrhea       loperamide (IMODIUM) 2 MG capsule Take 1 capsule (2 mg) by mouth daily Every Monday, Wednesday and Friday prior to dialysis 40 capsule 1     metoprolol tartrate (LOPRESSOR) 25 MG tablet Take 0.5 tablets (12.5 mg) by mouth 2 times daily 90 tablet 3     nitroGLYcerin (NITROSTAT) 0.4 MG sublingual tablet Place 1 tablet (0.4 mg) under the tongue every 5 minutes as needed for chest pain 25 tablet 0     ORDER FOR DME Equipment being ordered: cpap machine, mask, humidifier, tubing and filters 1 Device 0     pentoxifylline (TRENTAL) 400 MG CR tablet Take 1 tablet (400 mg) by mouth daily 90 tablet 0     pramipexole (MIRAPEX) 0.125 MG tablet Take 1 tablet (0.125 mg) by mouth 3 times daily 90 tablet 3     pravastatin (PRAVACHOL) 40 MG tablet Take 1 tablet (40 mg) by mouth daily 90 tablet 3     senna-docusate (SENOKOT-S;PERICOLACE) 8.6-50 MG per tablet Take 2 tablets by mouth 2 times daily as needed for constipation       Warfarin Sodium (COUMADIN PO) Take 1.25 mg by mouth daily Take as directed per INR results        Warfarin Sodium (COUMADIN PO) Take 2.5 mg by mouth       METOPROLOL TARTRATE PO Take 12.5 mg by  mouth 2 times daily       Allergies   Allergen Reactions     Ciprofloxacin Nausea and Vomiting     Zofran did not help     Oxycodone Visual Disturbance     Delusions, blackouts      Lisinopril Cough     Sulfa Drugs GI Disturbance     LOSS OF TASTE     Tape [Adhesive Tape]      Penicillins Other (See Comments)     Swelling and reddness at injection site, pt wanted it on      Recent Labs   Lab Test  03/23/18   0555  01/09/18   1000  12/09/17   2120   10/22/17   0305   10/13/17   0644  10/12/17   0456  10/11/17   0420   06/22/17   0658   04/28/17   1050  03/31/17   1510   04/12/16   1132  06/08/15   1140   01/23/14   0909   A1C   --    --    --    --    --    --   6.6*   --    --    --   7.3*   --    --   6.2*   < >  7.1*  6.0   < >  6.8*   LDL   --    --    --    --    --    --    --    --    --    --    --    --    --    --    --   47  15   --   62   HDL   --    --    --    --    --    --    --    --    --    --    --    --    --    --    --   65  55   --   51   TRIG   --    --    --    --    --    --    --    --    --    --    --    --    --    --    --   73  136   --   99   ALT   --    --    --    --   9   --    --   8  9   < >   --    < >  23  19   < >  25   --    < >   --    CR  3.68*   --   2.56*   < >  3.91*   < >   --   2.79*  3.68*   < >  4.19*   < >  1.44*  1.49*   < >  1.50*   --    < >  1.68*   GFRESTIMATED  12*   --   18*   < >  11*   < >   --   17*  12*   < >  10*   < >  35*  34*   < >  34*   --    < >  30*   GFRESTBLACK  15*   --   22*   < >  14*   < >   --   20*  15*   < >  13*   < >  43*  41*   < >  41*   --    < >  36*   POTASSIUM  3.2*  3.7  4.0   < >  4.1   < >   --   3.7  3.7   < >  4.1   < >  4.5  4.5   < >  3.6   --    < >  4.6   TSH   --    --    --    --    --    --    --    --   5.94*   --    --    --   0.75   --    < >  1.56   --    --    --     < > = values in this interval not displayed.      BP Readings from Last 3 Encounters:   04/27/18 124/64   04/17/18 126/76   04/11/18 139/76    Wt  Readings from Last 3 Encounters:   04/17/18 142 lb (64.4 kg)   04/11/18 141 lb 12.8 oz (64.3 kg)   04/03/18 140 lb (63.5 kg)                  Labs reviewed in EPIC    ROS:  Constitutional, HEENT, cardiovascular, pulmonary, gi and gu systems are negative, except as otherwise noted.    OBJECTIVE:     /64 (Cuff Size: Adult Regular)  Pulse 95  Temp 99  F (37.2  C) (Temporal)  Resp 20  SpO2 92%  There is no height or weight on file to calculate BMI.  GENERAL: healthy, alert and no distress  NECK: no adenopathy, no asymmetry, masses, or scars and thyroid normal to palpation  RESP: lungs clear to auscultation - no rales, rhonchi or wheezes  CV: regular rate and rhythm, normal S1 S2, no S3 or S4, no murmur, click or rub, no peripheral edema and peripheral pulses strong  ABDOMEN: soft, nontender, no hepatosplenomegaly, no masses and bowel sounds normal  MS: no gross musculoskeletal defects noted, no edema, contracture of the right fingers is noted.  Psychological:   Negative for memory, mood or flight of ideas at this time with appropriate affect.  Good recall of date, time and place.    Diagnostic Test Results:  No results found. However, due to the size of the patient record, not all encounters were searched. Please check Results Review for a complete set of results.    ASSESSMENT/PLAN:     1. Contracture of joint of right hand  noted  - OCCUPATIONAL THERAPY REFERRAL    2. Atrial flutter, unspecified type (H)  stable    3. Type 2 diabetes mellitus with complication, without long-term current use of insulin (H)  - Lipid panel reflex to direct LDL Fasting  - Hemoglobin A1c    4. Paroxysmal atrial fibrillation (H)  5. Physical deconditioning  6. Essential hypertension with goal blood pressure less than 140/90  Prolonged discussion is had around these issues and we note that she is under fairly good control at this point in time.  Have advised that she consider DEXA scan in the future.  Have advised that she be as  physically active as she possibly can be and not use the wheelchair anymore than she absolutely has to.  She is to get up with her walker on a regular basis so that she can build strength and stamina and balance maintenance as discussed.  - DX Hip/Pelvis/Spine; Future    7. Chronic heart failure, unspecified heart failure type (H)  - HEART FAILURE ACTION PLAN    Follow-up with primary care provider of choice in 1-2 months is advised.    Jovi Parker PA-C  [facility]

## 2018-04-25 RX ORDER — GUAIFENESIN AND DEXTROMETHORPHAN HYDROBROMIDE 100; 10 MG/5ML; MG/5ML
5 SOLUTION ORAL EVERY 4 HOURS PRN
COMMUNITY
End: 2018-07-20

## 2018-04-27 ENCOUNTER — OFFICE VISIT (OUTPATIENT)
Dept: FAMILY MEDICINE | Facility: OTHER | Age: 78
End: 2018-04-27
Payer: COMMERCIAL

## 2018-04-27 VITALS
RESPIRATION RATE: 20 BRPM | HEART RATE: 95 BPM | SYSTOLIC BLOOD PRESSURE: 124 MMHG | OXYGEN SATURATION: 92 % | TEMPERATURE: 99 F | DIASTOLIC BLOOD PRESSURE: 64 MMHG

## 2018-04-27 DIAGNOSIS — M24.541 CONTRACTURE OF JOINT OF RIGHT HAND: Primary | ICD-10-CM

## 2018-04-27 DIAGNOSIS — I50.9 CHRONIC HEART FAILURE, UNSPECIFIED HEART FAILURE TYPE (H): ICD-10-CM

## 2018-04-27 DIAGNOSIS — E11.8 TYPE 2 DIABETES MELLITUS WITH COMPLICATION, WITHOUT LONG-TERM CURRENT USE OF INSULIN (H): ICD-10-CM

## 2018-04-27 DIAGNOSIS — I10 ESSENTIAL HYPERTENSION WITH GOAL BLOOD PRESSURE LESS THAN 140/90: ICD-10-CM

## 2018-04-27 DIAGNOSIS — I48.0 PAROXYSMAL ATRIAL FIBRILLATION (H): ICD-10-CM

## 2018-04-27 DIAGNOSIS — I48.92 ATRIAL FLUTTER, UNSPECIFIED TYPE (H): ICD-10-CM

## 2018-04-27 DIAGNOSIS — R53.81 PHYSICAL DECONDITIONING: ICD-10-CM

## 2018-04-27 LAB
ALBUMIN SERPL-MCNC: 1.9 G/DL (ref 3.4–5)
ALP SERPL-CCNC: 182 U/L (ref 40–150)
ALT SERPL W P-5'-P-CCNC: 13 U/L (ref 0–50)
ANION GAP SERPL CALCULATED.3IONS-SCNC: 7 MMOL/L (ref 3–14)
AST SERPL W P-5'-P-CCNC: 15 U/L (ref 0–45)
BILIRUB SERPL-MCNC: 0.2 MG/DL (ref 0.2–1.3)
BUN SERPL-MCNC: 15 MG/DL (ref 7–30)
CALCIUM SERPL-MCNC: 6.8 MG/DL (ref 8.5–10.1)
CHLORIDE SERPL-SCNC: 101 MMOL/L (ref 94–109)
CHOLEST SERPL-MCNC: 95 MG/DL
CO2 SERPL-SCNC: 32 MMOL/L (ref 20–32)
CREAT SERPL-MCNC: 1.69 MG/DL (ref 0.52–1.04)
GFR SERPL CREATININE-BSD FRML MDRD: 29 ML/MIN/1.7M2
GLUCOSE SERPL-MCNC: 262 MG/DL (ref 70–99)
HBA1C MFR BLD: 5.1 % (ref 0–6.4)
HDLC SERPL-MCNC: 58 MG/DL
LDLC SERPL CALC-MCNC: 18 MG/DL
NONHDLC SERPL-MCNC: 37 MG/DL
POTASSIUM SERPL-SCNC: 3.8 MMOL/L (ref 3.4–5.3)
PROT SERPL-MCNC: 5.5 G/DL (ref 6.8–8.8)
SODIUM SERPL-SCNC: 140 MMOL/L (ref 133–144)
TRIGL SERPL-MCNC: 96 MG/DL

## 2018-04-27 PROCEDURE — 36415 COLL VENOUS BLD VENIPUNCTURE: CPT | Performed by: PHYSICIAN ASSISTANT

## 2018-04-27 PROCEDURE — 83036 HEMOGLOBIN GLYCOSYLATED A1C: CPT | Performed by: PHYSICIAN ASSISTANT

## 2018-04-27 PROCEDURE — 80053 COMPREHEN METABOLIC PANEL: CPT | Performed by: PHYSICIAN ASSISTANT

## 2018-04-27 PROCEDURE — 99214 OFFICE O/P EST MOD 30 MIN: CPT | Performed by: PHYSICIAN ASSISTANT

## 2018-04-27 PROCEDURE — 80061 LIPID PANEL: CPT | Performed by: PHYSICIAN ASSISTANT

## 2018-04-27 ASSESSMENT — PAIN SCALES - GENERAL: PAINLEVEL: NO PAIN (0)

## 2018-04-27 NOTE — LETTER
My Heart Failure Action Plan   Name: Kathryn Banks    YOB: 1940   Date: 4/27/2018    My doctor: Dheeraj Jj     [address] [address] [phone]  My Diagnosis: Combined Heart Failure   My Ejection Fraction: Over 50%    My Exercise Goal: 30 minutes daily  .     My Weight Goal: 135-145  Wt Readings from Last 2 Encounters:   04/17/18 142 lb (64.4 kg)   04/11/18 141 lb 12.8 oz (64.3 kg)     Weigh yourself daily using the same scale. If you gain more than 2 pounds in 24 hours or 5 pounds in a week call the clinic    My Diet Goal: No added salt    Emergency Room Visits:    Our goal is to improve your quality of life and help you avoid a visit to the emergency room or hospital.  If we work together, we can achieve this goal. But, if you feel you need to call 911 or go to the emergency room, please do so.  If you go to the emergency room, please bring your list of medicines and your daily weight chart with you.       GREEN ZONE     Doing well today    Weight gained is no more than 2 pounds a day or 5 pounds a week.    No swelling in feet, ankles, legs or stomach.    No more swelling than usual.    No more trouble breathing than usual.    No change in my sleep.    No other problems. Actions:    I am doing fine.  I will take my medicine, follow my diet, see my doctor, exercise, and watch for symptoms.           YELLOW ZONE         Having a bad day or flare up    Weight gain of more than 2 pounds in one day or 5 pounds in one week.    New swelling in ankle, leg, knee or thigh.    Bloating in belly, pants feel tighter.    Swelling in hands or face.    Coughing or trouble breathing while walking or talking.    Harder to breathe last night.    Have trouble sleeping, wake up short of breath.    Much more tired than usual.    Not eating.    Pain in my chest or bad leg cramps.    Feel weak or dizzy. Actions:    I need to take action and call my doctor or  nurse today.                 RED ZONE         Need medical care now    Weight gain of 5 pounds overnight.    Chest pain or pressure that does not go away.    Feel less alert.    Wheezing or have trouble breathing when at rest.    Cannot sleep lying down.    Cannot take my water pill.    Pass out or faint. Actions:    I need to call my doctor or nurse now!    Call 911 if I have chest pain or cannot breathe.

## 2018-04-27 NOTE — NURSING NOTE
"Chief Complaint   Patient presents with     Hospital F/U     Panel Management     op annual inr referral, dexa, honoring choices, lipids, assa, ace/arb, a1c, eye and foot exams, phq, mariluz, hf action plan       Initial /64 (Cuff Size: Adult Regular)  Pulse 95  Temp 99  F (37.2  C) (Temporal)  Resp 20  SpO2 92% Estimated body mass index is 24.37 kg/(m^2) as calculated from the following:    Height as of 1/9/18: 5' 4\" (1.626 m).    Weight as of 4/17/18: 142 lb (64.4 kg).  Medication Reconciliation: complete  "

## 2018-04-27 NOTE — MR AVS SNAPSHOT
After Visit Summary   4/27/2018    Kathryn Banks    MRN: [id number]           Patient Information     Date Of Birth          1940        Visit Information        Provider Department      4/27/2018 4:20 PM Jovi Thomas PA-C [facility]'s Diagnoses     Contracture of joint of right hand    -  1    Atrial flutter, unspecified type (H)        Type 2 diabetes mellitus with complication, without long-term current use of insulin (H)        Paroxysmal atrial fibrillation (H)        Physical deconditioning        Essential hypertension with goal blood pressure less than 140/90        Chronic heart failure, unspecified heart failure type (H)           Follow-ups after your visit        Additional Services     OCCUPATIONAL THERAPY REFERRAL       *This therapy referral will be filtered to a centralized scheduling office at [facility] and the patient will receive a call to schedule an appointment at a [place] location most convenient for them. *     [facility] provides Occupational Therapy evaluation and treatment and many specialty services across the [place] system.  If requesting a specialty program, please choose from the list below.    If you have not heard from the scheduling office within 2 business days, please call [phone] for all locations, with the exception of [place], please call [phone] and [facility], please call [phone]    Treatment: Evaluation & Treatment  Special Instructions/Modalities: home health OT for the hands and right hand contracture.  Special Programs: as needed based on assessment.    Please be aware that coverage of these services is subject to the terms and limitations of your health insurance plan.  Call member services at your health plan with any benefit or coverage questions.      **Note to Provider:  If you are referring outside of [place] for the therapy appointment, please list  "the name of the location in the \"special instructions\" above, print the referral and give to the patient to schedule the appointment.                  Future tests that were ordered for you today     Open Future Orders        Priority Expected Expires Ordered    DX Hip/Pelvis/Spine Routine  4/23/2019 4/27/2018            Who to contact     If you have questions or need follow up information about today's clinic visit or your schedule please contact [facility] directly at [phone].  Normal or non-critical lab and imaging results will be communicated to you by [facility], letter or phone within 4 business days after the clinic has received the results. If you do not hear from us within 7 days, please contact the clinic through [facility] or phone. If you have a critical or abnormal lab result, we will notify you by phone as soon as possible.  Submit refill requests through [facility] or call your pharmacy and they will forward the refill request to us. Please allow 3 business days for your refill to be completed.          Additional Information About Your Visit        [facility] Information     [facility] gives you secure access to your electronic health record. If you see a primary care provider, you can also send messages to your care team and make appointments. If you have questions, please call your primary care clinic.  If you do not have a primary care provider, please call [phone] and they will assist you.        Care EveryWhere ID     This is your Care EveryWhere ID. This could be used by other organizations to access your [place] medical records  [id number]        Your Vitals Were     Pulse Temperature Respirations Pulse Oximetry          95 99  F (37.2  C) (Temporal) 20 92%         Blood Pressure from Last 3 Encounters:   04/27/18 124/64   04/17/18 126/76   04/11/18 139/76    Weight from Last 3 Encounters:   04/17/18 142 lb (64.4 kg)   04/11/18 141 lb 12.8 oz (64.3 kg)   04/03/18 140 lb (63.5 " kg)              We Performed the Following     HEART FAILURE ACTION PLAN     Hemoglobin A1c     Lipid panel reflex to direct LDL Fasting     OCCUPATIONAL THERAPY REFERRAL        Primary Care Provider Office Phone # Fax #    Dheeraj Jj [phone] [facility] [id number]        Equal Access to Services     ZEKEDEVONTE EDWINA : Hadii aad ku hadjignao Soomaali, waaxda luqadaha, qaybta kaalmada adeegyada, waxay oswaldo kalebn manjitюлия banda la'timmyangel murillo. So [facility] [phone].    ATENCIÓN: Si habla español, tiene a medrano disposición servicios gratuitos de asistencia lingüística. Llame al [phone].    We comply with applicable federal civil rights laws and [place] laws. We do not discriminate on the basis of race, color, national origin, age, disability, sex, sexual orientation, or gender identity.            Thank you!     Thank you for choosing [facility]  for your care. Our goal is always to provide you with excellent care. Hearing back from our patients is one way we can continue to improve our services. Please take a few minutes to complete the written survey that you may receive in the mail after your visit with us. Thank you!             Your Updated Medication List - Protect others around you: Learn how to safely use, store and throw away your medicines at www.disposemymeds.org.          This list is accurate as of 4/27/18  4:46 PM.  Always use your most recent med list.                   Brand Name Dispense Instructions for use Diagnosis    ACETAMINOPHEN PO      Take 1,000 mg by mouth 3 times daily And give 500 mg PO every 12 hours as needed        calcium acetate 667 MG Caps capsule    PHOSLO     Take 667 mg by mouth 3 times daily (with meals) And 2 times daily with snacks        cholecalciferol 5000 units Tabs tablet    vitamin D3    90 tablet    Take 1 tablet (5,000 Units) by mouth daily    Vitamin D deficiency       * COUMADIN PO      Take 1.25 mg by mouth daily  Take as directed per INR results        * COUMADIN PO      Take 2.5 mg by mouth        Dextromethorphan-guaiFENesin  MG/5ML syrup      Take 5 mLs by mouth every 4 hours as needed for cough        ipratropium - albuterol 0.5 mg/2.5 mg/3 mL 0.5-2.5 (3) MG/3ML neb solution    DUONEB     Take 1 vial by nebulization every 6 hours as needed        * loperamide 2 MG capsule    IMODIUM     Take 2 mg by mouth every 6 hours as needed for diarrhea        * loperamide 2 MG capsule    IMODIUM    40 capsule    Take 1 capsule (2 mg) by mouth daily Every Monday, Wednesday and Friday prior to dialysis    Diarrhea, unspecified type       * METOPROLOL TARTRATE PO      Take 12.5 mg by mouth 2 times daily        * metoprolol tartrate 25 MG tablet    LOPRESSOR    90 tablet    Take 0.5 tablets (12.5 mg) by mouth 2 times daily    Atherosclerosis of native coronary artery of native heart without angina pectoris       nitroGLYcerin 0.4 MG sublingual tablet    NITROSTAT    25 tablet    Place 1 tablet (0.4 mg) under the tongue every 5 minutes as needed for chest pain    Hx of non-ST elevation myocardial infarction (NSTEMI), Atherosclerosis of native coronary artery of native heart without angina pectoris, Postsurgical aortocoronary bypass status       order for DME     1 Device    Equipment being ordered: cpap machine, mask, humidifier, tubing and filters    ANABEL (obstructive sleep apnea)       pentoxifylline 400 MG CR tablet    TRENtal    90 tablet    Take 1 tablet (400 mg) by mouth daily    PAD (peripheral artery disease) (H)       pramipexole 0.125 MG tablet    MIRAPEX    90 tablet    Take 1 tablet (0.125 mg) by mouth 3 times daily    Restless leg syndrome       pravastatin 40 MG tablet    PRAVACHOL    90 tablet    Take 1 tablet (40 mg) by mouth daily    Hx of non-ST elevation myocardial infarction (NSTEMI), Atherosclerosis of native coronary artery of native heart without angina pectoris, Type 2 diabetes mellitus with other  circulatory complications       RENAL 1 MG Caps      Take 1 capsule by mouth daily        senna-docusate 8.6-50 MG per tablet    SENOKOT-S;PERICOLACE     Take 2 tablets by mouth 2 times daily as needed for constipation        * Notice:  This list has 6 medication(s) that are the same as other medications prescribed for you. Read the directions carefully, and ask your doctor or other care provider to review them with you.

## 2018-05-01 ENCOUNTER — ANTICOAGULATION THERAPY VISIT (OUTPATIENT)
Dept: ANTICOAGULATION | Facility: CLINIC | Age: 78
End: 2018-05-01
Payer: COMMERCIAL

## 2018-05-01 ENCOUNTER — TELEPHONE (OUTPATIENT)
Dept: FAMILY MEDICINE | Facility: CLINIC | Age: 78
End: 2018-05-01

## 2018-05-01 LAB — INR PPP: 1.6

## 2018-05-01 NOTE — PROGRESS NOTES
ANTICOAGULATION FOLLOW-UP CLINIC VISIT    Patient Name:  Kathryn Banks  Date:  5/1/2018  Contact Type:  Telephone/ Anisa  nurse    SUBJECTIVE:     Patient Findings     Positives Missed doses (pt missed Saturday's dose of 2.5 mg )    Comments Reason for Call:  INR     Who is calling?  Home [facility]: [facility]     Phone number:  [phone]     Fax number:       Name of caller: Anisa     INR Value:  1.6     Are there any other concerns:  Yes: Missed a dose on Saturday - Patient has to leave in 20 minutes, Anisa is waiting to set up medication box.     Can we leave a detailed message on this number? YES     Phone number patient can be reached at: Other phone number:  [phone]*        Call taken on 5/1/2018 at 11:26 AM by Katlyn Gavin           OBJECTIVE    INR   Date Value Ref Range Status   05/01/2018 1.6  Final       ASSESSMENT / PLAN  INR assessment SUB    Recheck INR In: 1 WEEK    INR Location [facility]R      Anticoagulation Summary as of 5/1/2018     INR goal 2.0-3.0   Today's INR 1.6!   Maintenance plan 2.5 mg (2.5 mg x 1) on Mon, Wed, Fri; 1.25 mg (2.5 mg x 0.5) all other days   Full instructions 5/1: 2.5 mg; Otherwise 2.5 mg on Mon, Wed, Fri; 1.25 mg all other days   Weekly total 12.5 mg   Plan last modified Zo Davis, RN (5/1/2018)   Next INR check 5/8/2018   Target end date     Indications   Atrial fibrillation (H) (Resolved) [I48.91]  Long-term (current) use of anticoagulants [Z79.01] (Resolved) [Z79.01]         Anticoagulation Episode Summary     INR check location     Preferred lab     Send INR reminders to [facility] POOL    Comments 1 mg, 2.5 mg, and 3 mg tabs (as of 11/27, per HC nurse)        Anticoagulation Care Providers     Provider Role Specialty Phone number    Dheeraj Jj MD [facility] Internal Medicine [phone]            See the Encounter Report to view Anticoagulation Flowsheet and Dosing Calendar (Go to Encounters tab in chart review, and find the Anticoagulation Therapy  Visit)    Dosage adjustment made based on physician directed care plan.      Zo Davis RN

## 2018-05-01 NOTE — TELEPHONE ENCOUNTER
Reason for Call:  INR    Who is calling?  [facility]: [facility]    Phone number:  [phone]    Fax number:      Name of caller: Anisa    INR Value:  1.6    Are there any other concerns:  Yes: Missed a dose on Saturday - Patient has to leave in 20 minutes, Anisa is waiting to set up medication box.    Can we leave a detailed message on this number? YES    Phone number patient can be reached at: Other phone number:  [phone]*      Call taken on 5/1/2018 at 11:26 AM by Katlyn Gavin

## 2018-05-01 NOTE — MR AVS SNAPSHOT
Kathryn MICKY Banks   5/1/2018   Anticoagulation Therapy Visit    Description:  78 year old female   Provider:  Dheeraj Jj MD   Department:  Ph Anticoag           INR as of 5/1/2018     Today's INR 1.6!      Anticoagulation Summary as of 5/1/2018     INR goal 2.0-3.0   Today's INR 1.6!   Full instructions 5/1: 2.5 mg; Otherwise 2.5 mg on Mon, Wed, Fri; 1.25 mg all other days   Next INR check 5/8/2018    Indications   Atrial fibrillation (H) (Resolved) [I48.91]  Long-term (current) use of anticoagulants [Z79.01] (Resolved) [Z79.01]         Contact Numbers     Clinic Number:         May 2018 Details    Sun Mon Tue Wed Thu Fri Sat       1      2.5 mg   See details      2      2.5 mg         3      1.25 mg         4      2.5 mg         5      1.25 mg           6      1.25 mg         7      2.5 mg         8            9               10               11               12                 13               14               15               16               17               18               19                 20               21               22               23               24               25               26                 27               28               29               30               31                  Date Details   05/01 This INR check       Date of next INR:  5/8/2018         How to take your warfarin dose     To take:  1.25 mg Take 0.5 of a 2.5 mg tablet.    To take:  2.5 mg Take 1 of the 2.5 mg tablets.

## 2018-05-08 ENCOUNTER — ANTICOAGULATION THERAPY VISIT (OUTPATIENT)
Dept: ANTICOAGULATION | Facility: CLINIC | Age: 78
End: 2018-05-08
Payer: COMMERCIAL

## 2018-05-08 ENCOUNTER — TELEPHONE (OUTPATIENT)
Dept: INTERNAL MEDICINE | Facility: CLINIC | Age: 78
End: 2018-05-08

## 2018-05-08 LAB — INR PPP: 1.4

## 2018-05-08 PROCEDURE — 99207 ZZC NO CHARGE NURSE ONLY: CPT | Performed by: INTERNAL MEDICINE

## 2018-05-08 NOTE — TELEPHONE ENCOUNTER
Reason for Call:  INR    Who is calling?  Home Care    Phone number:  [phone]    Fax number:      Name of caller: Anisa    INR Value:  1.4    Are there any other concerns:  Yes: She missed 2 doses of her warfarin. One dose yesterday and one the day before.     Can we leave a detailed message on this number? YES    Phone number patient can be reached at: Home number on file [phone] (home)      Call taken on 5/8/2018 at 9:13 AM by Sienna Lafleur

## 2018-05-08 NOTE — PROGRESS NOTES
ANTICOAGULATION FOLLOW-UP CLINIC VISIT    Patient Name:  Kathryn Banks  Date:  5/8/2018  Contact Type:  Telephone/ Anisa  nurse    SUBJECTIVE:     Patient Findings     Positives Missed doses (missed Coumadin on Sat and Sun, per Anisa )    Comments        Reason for Call:  INR     Who is calling?  Home Care     Phone number:  [phone]     Fax number:       Name of caller: Anisa     INR Value:  1.4     Are there any other concerns:  Yes: She missed 2 doses of her warfarin. One dose yesterday and one the day before.      Can we leave a detailed message on this number? YES     Phone number patient can be reached at: Home number on file [phone] (home)        Call taken on 5/8/2018 at 9:13 AM by Sienna Lafleur              Reason for Call:  INR     Who is calling?  Home Care     Phone number:  [phone]     Fax number:       Name of caller: Anisa     INR Value:  1.4     Are there any other concerns:  Yes: She missed 2 doses of her warfarin. One dose yesterday and one the day before.      Can we leave a detailed message on this number? YES     Phone number patient can be reached at: Home number on file [phone] (home)        Call taken on 5/8/2018 at 9:13 AM by Sienna Lafleur                          OBJECTIVE    INR   Date Value Ref Range Status   05/08/2018 1.4  Final       ASSESSMENT / PLAN  INR assessment SUB    Recheck INR In: 2 DAYS    INR Location [facility]      Anticoagulation Summary as of 5/8/2018     INR goal 2.0-3.0   Today's INR 1.4!   Maintenance plan 2.5 mg (2.5 mg x 1) on Mon, Wed, Fri; 1.25 mg (2.5 mg x 0.5) all other days   Full instructions 5/8: 5 mg; Otherwise 2.5 mg on Mon, Wed, Fri; 1.25 mg all other days   Weekly total 12.5 mg   Plan last modified Zo Davis RN (5/1/2018)   Next INR check 5/10/2018   Target end date     Indications   Atrial fibrillation (H) (Resolved) [I48.91]  Long-term (current) use of anticoagulants [Z79.01] (Resolved) [Z79.01]          Anticoagulation Episode Summary     INR check location     Preferred lab     Send INR reminders to [facility] POOL    Comments 1 mg, 2.5 mg, and 3 mg tabs (as of 11/27, per HC nurse)        Anticoagulation Care Providers     Provider Role Specialty Phone number    Dheeraj Jj MD [facility] Internal Medicine [phone]            See the Encounter Report to view Anticoagulation Flowsheet and Dosing Calendar (Go to Encounters tab in chart review, and find the Anticoagulation Therapy Visit)    Dosage adjustment made based on physician directed care plan.      Zo Davis RN

## 2018-05-08 NOTE — MR AVS SNAPSHOT
Kathryn MICKY Banks   5/8/2018   Anticoagulation Therapy Visit    Description:  78 year old female   Provider:  Dheeraj Jj MD   Department:  Ph Anticoag           INR as of 5/8/2018     Today's INR 1.4!      Anticoagulation Summary as of 5/8/2018     INR goal 2.0-3.0   Today's INR 1.4!   Full instructions 5/8: 5 mg; Otherwise 2.5 mg on Mon, Wed, Fri; 1.25 mg all other days   Next INR check 5/10/2018    Indications   Atrial fibrillation (H) (Resolved) [I48.91]  Long-term (current) use of anticoagulants [Z79.01] (Resolved) [Z79.01]         Contact Numbers     Clinic Number:         May 2018 Details    Sun Mon Tue Wed Thu Fri Sat       1               2               3               4               5                 6               7               8      5 mg   See details      9      2.5 mg         10            11               12                 13               14               15               16               17               18               19                 20               21               22               23               24               25               26                 27               28               29               30               31                  Date Details   05/08 This INR check       Date of next INR:  5/10/2018         How to take your warfarin dose     To take:  1.25 mg Take 0.5 of a 2.5 mg tablet.    To take:  2.5 mg Take 1 of the 2.5 mg tablets.    To take:  5 mg Take 2 of the 2.5 mg tablets.

## 2018-05-10 ENCOUNTER — ANTICOAGULATION THERAPY VISIT (OUTPATIENT)
Dept: ANTICOAGULATION | Facility: CLINIC | Age: 78
End: 2018-05-10
Payer: COMMERCIAL

## 2018-05-10 ENCOUNTER — TELEPHONE (OUTPATIENT)
Dept: INTERNAL MEDICINE | Facility: CLINIC | Age: 78
End: 2018-05-10

## 2018-05-10 LAB — INR PPP: 1.2

## 2018-05-10 NOTE — PROGRESS NOTES
ANTICOAGULATION FOLLOW-UP CLINIC VISIT    Patient Name:  Kathryn Banks  Date:  5/10/2018  Contact Type:  Telephone/ [facility] - homecare    SUBJECTIVE:     Patient Findings     Positives Unexplained INR or factor level change    Comments Reason for Call:  INR     Who is calling?  Home Care: [facility]     Phone number:  [phone]     Fax number:       Name of caller: Anisa     INR Value:  1.2     Are there any other concerns:  No     Can we leave a detailed message on this number? YES     Call taken on 5/10/2018 at 9:14 AM by Agatha Rosenthal              OBJECTIVE    INR   Date Value Ref Range Status   05/10/2018 1.2  Final       ASSESSMENT / PLAN  INR assessment SUB    Recheck INR In: 5 DAYS    [facility]R Location [facility]R      Anticoagulation Summary as of 5/10/2018     INR goal 2.0-3.0   Today's INR 1.2!   Maintenance plan 2.5 mg (2.5 mg x 1) on Mon, Wed, Fri; 1.25 mg (2.5 mg x 0.5) all other days   Full instructions 5/10: 5 mg; 5/11: 5 mg; 5/13: 2.5 mg; Otherwise 2.5 mg on Mon, Wed, Fri; 1.25 mg all other days   Weekly total 12.5 mg   Plan last modified Zo Davis RN (5/1/2018)   Next INR check 5/15/2018   Target end date     Indications   Atrial fibrillation (H) (Resolved) [I48.91]  Long-term (current) use of anticoagulants [Z79.01] (Resolved) [Z79.01]         Anticoagulation Episode Summary     INR check location     Preferred lab     Send INR reminders to [facility] POOL    Comments 1 mg, 2.5 mg, and 3 mg tabs (as of 11/27, per HC nurse)        Anticoagulation Care Providers     Provider Role Specialty Phone number    Dheeraj Jj MD [facility] Internal Medicine [phone]            See the Encounter Report to view Anticoagulation Flowsheet and Dosing Calendar (Go to Encounters tab in chart review, and find the Anticoagulation Therapy Visit)    Dosage adjustment made based on physician directed care plan.    Gianni Palumbo, RN

## 2018-05-10 NOTE — MR AVS SNAPSHOT
Kathryn WEEKS Jocelyn   5/10/2018   Anticoagulation Therapy Visit    Description:  78 year old female   Provider:  Dheeraj Jj MD   Department:  Ph Anticoag           INR as of 5/10/2018     Today's INR 1.2!      Anticoagulation Summary as of 5/10/2018     INR goal 2.0-3.0   Today's INR 1.2!   Full instructions 5/10: 5 mg; 5/11: 5 mg; 5/13: 2.5 mg; Otherwise 2.5 mg on Mon, Wed, Fri; 1.25 mg all other days   Next INR check 5/15/2018    Indications   Atrial fibrillation (H) (Resolved) [I48.91]  Long-term (current) use of anticoagulants [Z79.01] (Resolved) [Z79.01]         Contact Numbers     Clinic Number:         May 2018 Details    Sun Mon Tue Wed Thu Fri Sat       1               2               3               4               5                 6               7               8               9               10      5 mg   See details      11      5 mg         12      1.25 mg           13      2.5 mg         14      2.5 mg         15            16               17               18               19                 20               21               22               23               24               25               26                 27               28               29               30               31                  Date Details   05/10 This INR check       Date of next INR:  5/15/2018         How to take your warfarin dose     To take:  1.25 mg Take 0.5 of a 2.5 mg tablet.    To take:  2.5 mg Take 1 of the 2.5 mg tablets.    To take:  5 mg Take 2 of the 2.5 mg tablets.

## 2018-05-10 NOTE — TELEPHONE ENCOUNTER
Reason for Call:  INR    Who is calling?  Home Care: [facility]    Phone number:  [phone]    Fax number:      Name of caller: Anisa    INR Value:  1.2    Are there any other concerns:  No    Can we leave a detailed message on this number? YES    Call taken on 5/10/2018 at 9:14 AM by Agatha Rosenthal

## 2018-05-15 ENCOUNTER — ANTICOAGULATION THERAPY VISIT (OUTPATIENT)
Dept: ANTICOAGULATION | Facility: CLINIC | Age: 78
End: 2018-05-15
Payer: COMMERCIAL

## 2018-05-15 ENCOUNTER — TELEPHONE (OUTPATIENT)
Dept: INTERNAL MEDICINE | Facility: CLINIC | Age: 78
End: 2018-05-15

## 2018-05-15 LAB — INR PPP: 1.4

## 2018-05-15 PROCEDURE — 99207 ZZC NO CHARGE NURSE ONLY: CPT | Performed by: INTERNAL MEDICINE

## 2018-05-15 NOTE — MR AVS SNAPSHOT
Kathryn MICKY Banks   5/15/2018   Anticoagulation Therapy Visit    Description:  78 year old female   Provider:  Dheeraj Jj MD   Department:  Ph Anticoag           INR as of 5/15/2018     Today's INR 1.4!      Anticoagulation Summary as of 5/15/2018     INR goal 2.0-3.0   Today's INR 1.4!   Full instructions 5/15: 7.5 mg; 5/16: 5 mg; Otherwise 2.5 mg on Mon, Wed, Fri; 1.25 mg all other days   Next INR check 5/17/2018    Indications   Atrial fibrillation (H) (Resolved) [I48.91]  Long-term (current) use of anticoagulants [Z79.01] (Resolved) [Z79.01]         Contact Numbers     Clinic Number:         May 2018 Details    Sun Mon Tue Wed Thu Fri Sat       1               2               3               4               5                 6               7               8               9               10               11               12                 13               14               15      7.5 mg   See details      16      5 mg         17            18               19                 20               21               22               23               24               25               26                 27               28               29               30               31                  Date Details   05/15 This INR check       Date of next INR:  5/17/2018         How to take your warfarin dose     To take:  1.25 mg Take 0.5 of a 2.5 mg tablet.    To take:  5 mg Take 2 of the 2.5 mg tablets.    To take:  7.5 mg Take 3 of the 2.5 mg tablets.

## 2018-05-15 NOTE — TELEPHONE ENCOUNTER
Reason for Call:  INR    Who is calling?  Home Care:     Phone number:  [phone]    Name of caller: Anisa    INR Value:  1.4    Are there any other concerns:  No    Can we leave a detailed message on this number? YES     Phone number patient can be reached at: Other phone number:  [phone]      Call taken on 5/15/2018 at 11:28 AM by Tabitha Bland

## 2018-05-15 NOTE — PROGRESS NOTES
ANTICOAGULATION FOLLOW-UP CLINIC VISIT    Patient Name:  Kathryn Banks  Date:  5/15/2018  Contact Type:  Telephone/ VELASQUEZ Langenurse    SUBJECTIVE:     Patient Findings     Positives Unexplained INR or factor level change    Comments Pt will be discharging from  on Thursday 5/17.  Per Anisa, pt has been setting up her own medications and she does not think she missed any doses.   Pt will be going to [place] 5/19-5/25. I informed Anisa that pt will likely need to take her home monitor with her so she can check it while she is gone  Zo Davis, RN                Reason for Call:  INR     Who is calling?  Home Care:      Phone number:  [phone]     Name of caller: Anisa     INR Value:  1.4     Are there any other concerns:  No     Can we leave a detailed message on this number? YES      Phone number patient can be reached at: Other phone number:  [phone]        Call taken on 5/15/2018 at 11:28 AM by Tabitha Bland           OBJECTIVE    INR   Date Value Ref Range Status   05/15/2018 1.4  Final       ASSESSMENT / PLAN  INR assessment SUB    Recheck INR In: 2 DAYS    INR Location [facility] INR      Anticoagulation Summary as of 5/15/2018     INR goal 2.0-3.0   Today's INR 1.4!   Maintenance plan 2.5 mg (2.5 mg x 1) on Mon, Wed, Fri; 1.25 mg (2.5 mg x 0.5) all other days   Full instructions 5/15: 7.5 mg; 5/16: 5 mg; Otherwise 2.5 mg on Mon, Wed, Fri; 1.25 mg all other days   Weekly total 12.5 mg   Plan last modified Zo Davis, RN (5/1/2018)   Next INR check 5/17/2018   Target end date     Indications   Atrial fibrillation (H) (Resolved) [I48.91]  Long-term (current) use of anticoagulants [Z79.01] (Resolved) [Z79.01]         Anticoagulation Episode Summary     INR check location     Preferred lab     Send INR reminders to [facility] POOL    Comments 1 mg, 2.5 mg, and 3 mg tabs (as of 11/27, per  nurse)        Anticoagulation Care Providers     Provider Role Specialty Phone number    Parviz  Dheeraj ACOSTA MD [facility] Internal Medicine [phone]            See the Encounter Report to view Anticoagulation Flowsheet and Dosing Calendar (Go to Encounters tab in chart review, and find the Anticoagulation Therapy Visit)    Dosage adjustment made based on physician directed care plan.        Zo Davis RN

## 2018-05-17 ENCOUNTER — TELEPHONE (OUTPATIENT)
Dept: INTERNAL MEDICINE | Facility: CLINIC | Age: 78
End: 2018-05-17

## 2018-05-17 ENCOUNTER — TELEPHONE (OUTPATIENT)
Dept: FAMILY MEDICINE | Facility: CLINIC | Age: 78
End: 2018-05-17
Payer: COMMERCIAL

## 2018-05-17 DIAGNOSIS — G25.81 RESTLESS LEG SYNDROME: Primary | ICD-10-CM

## 2018-05-17 DIAGNOSIS — S32.9XXD FRACTURE OF UNSPECIFIED PARTS OF LUMBOSACRAL SPINE AND PELVIS, SUBSEQUENT ENCOUNTER FOR FRACTURE WITH ROUTINE HEALING: ICD-10-CM

## 2018-05-17 PROCEDURE — G0180 MD CERTIFICATION HHA PATIENT: HCPCS | Performed by: INTERNAL MEDICINE

## 2018-05-17 NOTE — TELEPHONE ENCOUNTER
Forms received from [facility] on 05/17/18.  Forms with RN to review medications.  Orders pended for provider to sign.    Forms given to Christina for PCP signature 5/21/2018.

## 2018-05-17 NOTE — TELEPHONE ENCOUNTER
Anisa from  Home care calling. She was unable to do her INR today. Is it OK to do it tomorrow? Please call her at [phone].   Thank you,  Zahira Diaz- Pt Rep.

## 2018-05-17 NOTE — TELEPHONE ENCOUNTER
Informed Anisa that it was ok to draw Kathryn's INR tomorrow and to have her take 2.5 mg of coumadin today. Anisa verbalized understanding and agrees with plan of care. She had no further questions or concerns at this time. Gianni Palumbo, RN, BSN

## 2018-05-18 ENCOUNTER — ANTICOAGULATION THERAPY VISIT (OUTPATIENT)
Dept: ANTICOAGULATION | Facility: CLINIC | Age: 78
End: 2018-05-18
Payer: COMMERCIAL

## 2018-05-18 ENCOUNTER — TELEPHONE (OUTPATIENT)
Dept: INTERNAL MEDICINE | Facility: CLINIC | Age: 78
End: 2018-05-18

## 2018-05-18 LAB — INR PPP: 1.2

## 2018-05-18 PROCEDURE — 99207 ZZC NO CHARGE NURSE ONLY: CPT | Performed by: INTERNAL MEDICINE

## 2018-05-18 NOTE — TELEPHONE ENCOUNTER
Reason for Call:  INR    Who is calling?  Home [facility]: [facility]    Phone number:  [phone]    Fax number:  Na     Name of caller: Anisa     INR Value:  1.2    Are there any other concerns:  No- Pt is leaving out of town in 20 mins, they would like a call back ASAP.    Can we leave a detailed message on this number? YES    Phone number patient can be reached at: Home number on file [phone] (home) or Work number on file:  There is no work phone number on file.    Call taken on 5/18/2018 at 1:07 PM by Zahira Diaz

## 2018-05-18 NOTE — PROGRESS NOTES
ANTICOAGULATION FOLLOW-UP CLINIC VISIT    Patient Name:  Kathryn Banks  Date:  5/18/2018  Contact Type:  Telephone/ Anisa  nurse- pt discharging from  today    SUBJECTIVE:     Patient Findings     Positives Change in diet/appetite (pt states that she has been eating protein bars ('all different kinds')- unsure if some contain Loli K), Unexplained INR or factor level change    Comments Pt going to [place] until Friday 5/25. She knows she needs to bring her home monitor with her and check INR while traveling  Zo Davis RN        Reason for Call:  INR     Who is calling?  Home Care: [facility]     Phone number:  [phone]     Fax number:  Na      Name of caller: Anisa      INR Value:  1.2     Are there any other concerns:  No- Pt is leaving out of town in 20 mins, they would like a call back ASAP.     Can we leave a detailed message on this number? YES     Phone number patient can be reached at: Home number on file [phone] (home) or Work number on file:  There is no work phone number on file.     Call taken on 5/18/2018 at 1:07 PM by Zahira Diaz              OBJECTIVE    INR   Date Value Ref Range Status   05/18/2018 1.2  Final       ASSESSMENT / PLAN  INR assessment THER    Recheck INR In: 4 DAYS    INR Location [facility] INR      Anticoagulation Summary as of 5/18/2018     INR goal 2.0-3.0   Today's INR 1.2!   Maintenance plan 2.5 mg (2.5 mg x 1) on Mon, Wed, Fri; 1.25 mg (2.5 mg x 0.5) all other days   Full instructions 5/18: 10 mg; 5/19: 2.5 mg; 5/20: 5 mg; Otherwise 2.5 mg on Mon, Wed, Fri; 1.25 mg all other days   Weekly total 12.5 mg   Plan last modified Zo Davis RN (5/1/2018)   Next INR check 5/22/2018   Target end date     Indications   Atrial fibrillation (H) (Resolved) [I48.91]  Long-term (current) use of anticoagulants [Z79.01] (Resolved) [Z79.01]         Anticoagulation Episode Summary     INR check location     Preferred lab     Send INR reminders to hospitals     Comments 1 mg, 2.5 mg, and 3 mg tabs (as of 11/27, per HC nurse)        Anticoagulation Care Providers     Provider Role Specialty Phone number    Dheeraj Jj MD [facility] Internal Medicine [phone]            See the Encounter Report to view Anticoagulation Flowsheet and Dosing Calendar (Go to Encounters tab in chart review, and find the Anticoagulation Therapy Visit)    Dosage adjustment made based on physician directed care plan.    Zo Davis RN

## 2018-05-18 NOTE — MR AVS SNAPSHOT
Kathryn WEEKS Jocelyn   5/18/2018   Anticoagulation Therapy Visit    Description:  78 year old female   Provider:  Dheeraj Jj MD   Department:  Ph Anticoag           INR as of 5/18/2018     Today's INR 1.2!      Anticoagulation Summary as of 5/18/2018     INR goal 2.0-3.0   Today's INR 1.2!   Full instructions 5/18: 10 mg; 5/19: 2.5 mg; 5/20: 5 mg; Otherwise 2.5 mg on Mon, Wed, Fri; 1.25 mg all other days   Next INR check 5/22/2018    Indications   Atrial fibrillation (H) (Resolved) [I48.91]  Long-term (current) use of anticoagulants [Z79.01] (Resolved) [Z79.01]         Contact Numbers     Clinic Number:         May 2018 Details    Sun Mon Tue Wed Thu Fri Sat       1               2               3               4               5                 6               7               8               9               10               11               12                 13               14               15               16               17               18      10 mg   See details      19      2.5 mg           20      5 mg         21      2.5 mg         22            23               24               25               26                 27               28               29               30               31                  Date Details   05/18 This INR check       Date of next INR:  5/22/2018         How to take your warfarin dose     To take:  1.25 mg Take 0.5 of a 2.5 mg tablet.    To take:  2.5 mg Take 1 of the 2.5 mg tablets.    To take:  5 mg Take 2 of the 2.5 mg tablets.    To take:  10 mg Take 4 of the 2.5 mg tablets.

## 2018-05-21 RX ORDER — PRAMIPEXOLE DIHYDROCHLORIDE 0.12 MG/1
0.25 TABLET ORAL AT BEDTIME
Qty: 90 TABLET | Refills: 3 | COMMUNITY
Start: 2018-05-21 | End: 2018-07-20

## 2018-06-08 ENCOUNTER — TRANSFERRED RECORDS (OUTPATIENT)
Dept: HEALTH INFORMATION MANAGEMENT | Facility: CLINIC | Age: 78
End: 2018-06-08

## 2018-06-15 ENCOUNTER — TELEPHONE (OUTPATIENT)
Dept: ANTICOAGULATION | Facility: CLINIC | Age: 78
End: 2018-06-15

## 2018-06-22 ENCOUNTER — TELEPHONE (OUTPATIENT)
Dept: INTERNAL MEDICINE | Facility: CLINIC | Age: 78
End: 2018-06-22

## 2018-06-22 NOTE — TELEPHONE ENCOUNTER
Reason for Call:  Other appointment    Detailed comments: patient would like to see Dr. Jj for a medication check for her Kidney and Blood Pressure medication before his next available appointment and 7/13/18. Patient states that he won't refill her medications without being seen. Patient is aware that provider is not in the office today. Please call and advise     Phone Number Patient can be reached at: Home number on file [phone] (home)     Best Time: any    Can we leave a detailed message on this number? YES    Call taken on 6/22/2018 at 11:05 AM by Tabitha Bland

## 2018-07-19 ENCOUNTER — TELEPHONE (OUTPATIENT)
Dept: INTERNAL MEDICINE | Facility: CLINIC | Age: 78
End: 2018-07-19

## 2018-07-19 NOTE — TELEPHONE ENCOUNTER
Reason for Call:  Same Day Appointment, Requested Provider:  Dheeraj Jj MD    PCP: Dheeraj Jj    Reason for visit: pt missed her appt Wed. Wondering if PL can see pt Friday.     Duration of symptoms:     Have you been treated for this in the past?     Additional comments: pt aware PL out of clinic today    Can we leave a detailed message on this number? YES    Phone number patient can be reached at:     Best Time:     Call taken on 7/19/2018 at 7:55 AM by Amanda Saba

## 2018-07-19 NOTE — TELEPHONE ENCOUNTER
Call out to pt who reports that she needs to be seen to get medication refills. Pt was in agreement with the appt only 15 minutes long.

## 2018-07-19 NOTE — TELEPHONE ENCOUNTER
I only have short visits and she probably needs more time.  If something urgent could see for short but only cover one item

## 2018-07-20 ENCOUNTER — OFFICE VISIT (OUTPATIENT)
Dept: INTERNAL MEDICINE | Facility: CLINIC | Age: 78
End: 2018-07-20
Payer: COMMERCIAL

## 2018-07-20 VITALS
SYSTOLIC BLOOD PRESSURE: 124 MMHG | RESPIRATION RATE: 16 BRPM | BODY MASS INDEX: 23.52 KG/M2 | HEART RATE: 110 BPM | TEMPERATURE: 98.9 F | DIASTOLIC BLOOD PRESSURE: 62 MMHG | WEIGHT: 137 LBS | OXYGEN SATURATION: 94 %

## 2018-07-20 DIAGNOSIS — I25.10 ATHEROSCLEROSIS OF NATIVE CORONARY ARTERY OF NATIVE HEART WITHOUT ANGINA PECTORIS: ICD-10-CM

## 2018-07-20 DIAGNOSIS — I48.20 CHRONIC ATRIAL FIBRILLATION (H): ICD-10-CM

## 2018-07-20 DIAGNOSIS — N17.9 ACUTE RENAL FAILURE SUPERIMPOSED ON STAGE 4 CHRONIC KIDNEY DISEASE, UNSPECIFIED ACUTE RENAL FAILURE TYPE (H): Primary | ICD-10-CM

## 2018-07-20 DIAGNOSIS — I73.9 PAD (PERIPHERAL ARTERY DISEASE) (H): ICD-10-CM

## 2018-07-20 DIAGNOSIS — N18.4 ACUTE RENAL FAILURE SUPERIMPOSED ON STAGE 4 CHRONIC KIDNEY DISEASE, UNSPECIFIED ACUTE RENAL FAILURE TYPE (H): Primary | ICD-10-CM

## 2018-07-20 DIAGNOSIS — I25.2 HX OF NON-ST ELEVATION MYOCARDIAL INFARCTION (NSTEMI): ICD-10-CM

## 2018-07-20 DIAGNOSIS — G25.81 RESTLESS LEG SYNDROME: ICD-10-CM

## 2018-07-20 PROCEDURE — 99215 OFFICE O/P EST HI 40 MIN: CPT | Performed by: INTERNAL MEDICINE

## 2018-07-20 RX ORDER — PRAVASTATIN SODIUM 40 MG
40 TABLET ORAL DAILY
Qty: 90 TABLET | Refills: 3 | Status: SHIPPED | OUTPATIENT
Start: 2018-07-20

## 2018-07-20 RX ORDER — PRAMIPEXOLE DIHYDROCHLORIDE 0.12 MG/1
0.25 TABLET ORAL AT BEDTIME
Qty: 90 TABLET | Refills: 3 | Status: SHIPPED | OUTPATIENT
Start: 2018-07-20

## 2018-07-20 RX ORDER — PENTOXIFYLLINE 400 MG/1
400 TABLET, EXTENDED RELEASE ORAL DAILY
Qty: 90 TABLET | Refills: 0 | Status: SHIPPED | OUTPATIENT
Start: 2018-07-20 | End: 2019-01-01

## 2018-07-20 RX ORDER — METOPROLOL TARTRATE 25 MG/1
12.5 TABLET, FILM COATED ORAL 2 TIMES DAILY
Qty: 90 TABLET | Refills: 1 | Status: SHIPPED | OUTPATIENT
Start: 2018-07-20 | End: 2018-01-01

## 2018-07-20 ASSESSMENT — PAIN SCALES - GENERAL: PAINLEVEL: NO PAIN (0)

## 2018-07-20 NOTE — PROGRESS NOTES
Kathryn Banks is a 78 year old female who presents with recheck, she is taking dialysis 3 days, M,W, F will have an extra session tomorrow to get more fluid off. Dr Juanjose Dhillon sees her at dialysis.      [facility] June 8 to the 12, sent in from dialysis for a fib.     She hasn't been on coumadin for some time, bruising and bleeding already.   She understands the risk and doesn't want coumadin or anti coagulation.  She hasn't been compliant with meds or INR so safer to be without the coumadin.     No metoprolol at home, heartrate is up to 110 today, needs a low dose due to hypotension.      Using mirapex at night for restless legs.    Pravastatin for cholesterol and heart.    Renal caps needs those, been without.     Past Medical History:   Diagnosis Date     Carpal tunnel syndrome      Coronary atherosclerosis of native coronary artery 2006    5 vessel CABG     OBSTRUCTIVE SLEEP APNEA     using CPAP     Osteoarthrosis, unspecified whether generalized or localized, unspecified site     generalized arthritis, particularly in her hands and feet         Other and combined forms of senile cataract 2000    Bilateral     Other and unspecified hyperlipidemia      RESTLESS LEGS SYNDROME      TENOSYNOV HAND/WRIST NEC 6/30/2006     Type II or unspecified type diabetes mellitus without mention of complication, not stated as uncontrolled 2001    Diabetes mellitus/pt is diet controlled with weight loss     Typical atrial flutter (H) 01/17/2007    ablation 6/7/2017     Unspecified essential hypertension      Current Outpatient Prescriptions   Medication     ACETAMINOPHEN PO     calcium acetate (PHOSLO) 667 MG CAPS capsule     loperamide (IMODIUM) 2 MG capsule     metoprolol tartrate (LOPRESSOR) 25 MG tablet     NEPHROCAPS (TRIPHROCAPS) 1 MG capsule     pentoxifylline (TRENTAL) 400 MG CR tablet     pramipexole (MIRAPEX) 0.125 MG tablet     pravastatin (PRAVACHOL) 40 MG tablet     senna-docusate (SENOKOT-S;PERICOLACE) 8.6-50 MG per  tablet     B Complex-C-Folic Acid (RENAL) 1 MG CAPS     nitroGLYcerin (NITROSTAT) 0.4 MG sublingual tablet     ORDER FOR DME     [DISCONTINUED] metoprolol tartrate (LOPRESSOR) 25 MG tablet     [DISCONTINUED] pentoxifylline (TRENTAL) 400 MG CR tablet     [DISCONTINUED] pramipexole (MIRAPEX) 0.125 MG tablet     [DISCONTINUED] pravastatin (PRAVACHOL) 40 MG tablet     No current facility-administered medications for this visit.      Social History   Substance Use Topics     Smoking status: Former Smoker     Years: 10.00     Quit date: 1/1/1969     Smokeless tobacco: Never Used     Alcohol use 0.0 oz/week     0 Standard drinks or equivalent per week      Comment: 1/2 a beer once a month   Living with her .    Review of Systems  Constitutional-No fevers, chills, or weight changes..  Eyes-subconjunctival hemorrhages.  Cardiac-No chest pain or palpitations.  Respiratory-No cough, sob, or hemoptysis.  GI-No nausea, vomitting, diarrhea, constipation, or blood in the stool.  Musculoskeletal-+joint pains.  Skin-easy bruising.  Neuro-No numbness, tingling, or weakness.    Physical Exam  /62  Pulse 110  Temp 98.9  F (37.2  C) (Temporal)  Resp 16  Wt 137 lb (62.1 kg)  SpO2 94%  BMI 23.52 kg/m2  General Appearance-alert, no distress, sitting in a wheelchair, more alert than normal.  Eyes- Right sided conjunctival erythema  Cardiac- Sinus tachycardia  Lungs-clear to auscultation  Extremities-chronic 1+ woody edema  Skin-Skin color, texture, turgor normal. No rashes or lesions.  Bruises throughout    ASSESSMENT:  This is a 78-year-old woman who has a history of end-stage renal disease on dialysis.  She gets dialysis on Monday Wednesday Friday, she still some fluid retention so she is going for an extra session tomorrow.  She had a long history of congestive heart failure and fluid retention and actually more stable on dialysis.  She has a history of atrial fibrillation with rapid ventricular rate she was in the  [facility]12.  She got some beta-blocker but did not have her meds and says it has run out again.  She has not taken her meds very compliant.    We discussed this and she says is her choice to not take her meds and she has not come in to get them refilled.  I will refill her metoprolol for atrial fibrillation rate control and a very low dose of 12.5 mg twice a day.    Anticoagulation we discussed her Coumadin and she does not check her INR therefore I will not refill her Coumadin but she does not want with all of her easy bruising and subconjunctival hemorrhages.  She is aware of the risk of stroke with her atrial fibrillation but still does not want this.    Her other labs are being checked at dialysis.    I do worry about her and her  at home and that they are at high risk of falls.  I did tell her that she should not be driving.  She has a ride to dialysis she states.  I recommended they moved to assisted living and I will asked care coordination to help her.    I spent greater than 50% of this 45 minute visit in counseling and coordination of care of above issues.      Electronically signed by Dheeraj Jj MD

## 2018-07-20 NOTE — MR AVS SNAPSHOT
After Visit Summary   7/20/2018    Kathryn Banks    MRN: [id number]           Patient Information     Date Of Birth          1940        Visit Information        Provider Department      7/20/2018 12:00 PM Dheeraj Jj MD [facility]         Follow-ups after your visit        Your next 10 appointments already scheduled     Jul 20, 2018 12:30 PM CDT   LAB with NL LAB PMC   [facility] ([facility])    [address] [place] [id number] [phone]           Please do not eat 10-12 hours before your appointment if you are coming in fasting for labs on lipids, cholesterol, or glucose (sugar). This does not apply to pregnant women. Water, hot tea and black coffee (with nothing added) are okay. Do not drink other fluids, diet soda or chew gum.              Who to contact     If you have questions or need follow up information about today's clinic visit or your schedule please contact [facility] directly at [phone].  Normal or non-critical lab and imaging results will be communicated to you by One[employer], letter or phone within 4 business days after the clinic has received the results. If you do not hear from us within 7 days, please contact the clinic through [employer] or phone. If you have a critical or abnormal lab result, we will notify you by phone as soon as possible.  Submit refill requests through [employer] or call your pharmacy and they will forward the refill request to us. Please allow 3 business days for your refill to be completed.          Additional Information About Your Visit        [employer] Information     [employer] gives you secure access to your electronic health record. If you see a primary care provider, you can also send messages to your care team and make appointments. If you have questions, please call your primary care clinic.  If you do not have a primary care provider, please call [phone] and they  will assist you.        Care EveryWhere ID     This is your Care EveryWhere ID. This could be used by other organizations to access your [facility] medical records  [id number]        Your Vitals Were     Pulse Temperature Respirations Pulse Oximetry BMI (Body Mass Index)       110 98.9  F (37.2  C) (Temporal) 16 94% 23.52 kg/m2        Blood Pressure from Last 3 Encounters:   07/20/18 124/62   04/27/18 124/64   04/17/18 126/76    Weight from Last 3 Encounters:   07/20/18 137 lb (62.1 kg)   04/17/18 142 lb (64.4 kg)   04/11/18 141 lb 12.8 oz (64.3 kg)              Today, you had the following     No orders found for display       Primary Care Provider Office Phone # Fax #    Dheeraj Jj [phone] [address] [facility] [address]        Equal Access to Services     CAROLINE BLNAD : Hadii aad ku hadasho Soomaali, waaxda luqadaha, qaybta kaalmada adeegyada, waxay radhain haytimmyn beverly santizo . So [facility] [phone].    ATENCIÓN: Si habla español, tiene a merdano disposición servicios gratuitos de asistencia lingüística. Jonathancarolyn al [phone].    We comply with applicable federal civil rights laws and [place] laws. We do not discriminate on the basis of race, color, national origin, age, disability, sex, sexual orientation, or gender identity.            Thank you!     Thank you for choosing [facility]  for your care. Our goal is always to provide you with excellent care. Hearing back from our patients is one way we can continue to improve our services. Please take a few minutes to complete the written survey that you may receive in the mail after your visit with us. Thank you!             Your Updated Medication List - Protect others around you: Learn how to safely use, store and throw away your medicines at www.disposemymeds.org.          This list is accurate as of 7/20/18 12:14 PM.  Always use your most recent med list.                   Brand Name Dispense  Instructions for use Diagnosis    ACETAMINOPHEN PO      Take 1,000 mg by mouth 3 times daily And give 500 mg PO every 12 hours as needed        calcium acetate 667 MG Caps capsule    PHOSLO     Take 667 mg by mouth 3 times daily (with meals) And 2 times daily with snacks        * COUMADIN PO      Take 1.25 mg by mouth daily Take as directed per INR results        * COUMADIN PO      Take 2.5 mg by mouth        loperamide 2 MG capsule    IMODIUM    40 capsule    Take 1 capsule (2 mg) by mouth daily Every Monday, Wednesday and Friday prior to dialysis    Diarrhea, unspecified type       metoprolol tartrate 25 MG tablet    LOPRESSOR    90 tablet    Take 0.5 tablets (12.5 mg) by mouth 2 times daily    Atherosclerosis of native coronary artery of native heart without angina pectoris       nitroGLYcerin 0.4 MG sublingual tablet    NITROSTAT    25 tablet    Place 1 tablet (0.4 mg) under the tongue every 5 minutes as needed for chest pain    Hx of non-ST elevation myocardial infarction (NSTEMI), Atherosclerosis of native coronary artery of native heart without angina pectoris, Postsurgical aortocoronary bypass status       order for DME     1 Device    Equipment being ordered: cpap machine, mask, humidifier, tubing and filters    ANABEL (obstructive sleep apnea)       pentoxifylline 400 MG CR tablet    TRENtal    90 tablet    Take 1 tablet (400 mg) by mouth daily    PAD (peripheral artery disease) (H)       pramipexole 0.125 MG tablet    MIRAPEX    90 tablet    Take 2 tablets (0.25 mg) by mouth At Bedtime    Restless leg syndrome       pravastatin 40 MG tablet    PRAVACHOL    90 tablet    Take 1 tablet (40 mg) by mouth daily    Hx of non-ST elevation myocardial infarction (NSTEMI), Atherosclerosis of native coronary artery of native heart without angina pectoris, Type 2 diabetes mellitus with other circulatory complications       RENAL 1 MG Caps      Take 1 capsule by mouth daily        senna-docusate 8.6-50 MG per tablet     SENOKOT-S;PERICOLACE     Take 2 tablets by mouth 2 times daily as needed for constipation        * Notice:  This list has 2 medication(s) that are the same as other medications prescribed for you. Read the directions carefully, and ask your doctor or other care provider to review them with you.

## 2018-07-24 ENCOUNTER — PATIENT OUTREACH (OUTPATIENT)
Dept: CARE COORDINATION | Facility: CLINIC | Age: 78
End: 2018-07-24

## 2018-07-24 ASSESSMENT — ACTIVITIES OF DAILY LIVING (ADL): DEPENDENT_IADLS:: CLEANING;TRANSPORTATION

## 2018-07-24 NOTE — PROGRESS NOTES
Clinic Care Coordination Contact    Clinic Care Coordination Contact  OUTREACH    Referral Information:  Referral Source: PCP    Primary Diagnosis: Psychosocial    Chief Complaint   Patient presents with     Clinic Care Coordination - Initial     SW        Universal Utilization:   Clinic Utilization  Difficulty keeping appointments:: No  Utilization    Last refreshed: 7/24/2018  3:46 PM:  No Show Count (past year) 6       Last refreshed: 7/24/2018  3:46 PM:  ED visits 6       Last refreshed: 7/24/2018  3:46 PM:  Hospital admissions 5          Current as of: 7/24/2018  3:46 PM           Clinical Concerns:   Patient Active Problem List   Diagnosis     Restless leg syndrome     ANABEL on CPAP     Lipoma of other skin and subcutaneous tissue     Esophageal reflux     History of peptic ulcer disease     Kidney stone     Hyperlipidemia LDL goal <100     Advanced directives, counseling/discussion     CAD - NSTEMI, CABG x 5 in 2006, angio 2013 patent grafts except occluded graft to [facility]     Lymphedema     Osteoarthritis of hip     Essential hypertension with goal blood pressure less than 140/90     Pulmonary hypertension     Chronic heart failure (H) with preserved EF     Atrial flutter (H) - present 6/12     Proteinuria 2.1 g/g Cr     Acute on chronic renal failure -- Dialysis started 10/2017     Memory loss     Microscopic hematuria     Anemia in chronic kidney disease     Paroxysmal atrial fibrillation (H)     Anxiety     SBO -- S/P Lysis of Adhes 10/24/17     DM type 2 -- Hgb A1C 6.6 on 10/13/17     ESRD (end stage renal disease) on dialysis (H)     Physical deconditioning     Bilateral leg edema     Type 2 diabetes mellitus with complication (H)     Postprocedural hypotension     Morbid obesity (H)     Fall off motorized mobility scooter, initial encounter     Fall     Acute decompensated heart failure (H)     Contusion of right hip, initial encounter     Current Medical Concerns:  Pt was recently in to  "see PCP who states pt has not been very compliant with medications. She states this has been her choice. Pt has end stage renal disease and is on dialysis.     Current Behavioral Concerns: PCP voiced concerns with pt who also cares for her  and has health issues of her own.       Education Provided to patient: PCP recommends pt not drive. Pt states that when she is feeling good, she'll drive but only in town. She doesn't drive out of town or long distances. Pt states she has rides to dialysis right now.  SW CCC talked about Assisted living and pt adamantly states she and her  do not need assisted living right now.      Health Maintenance Reviewed: Due/Overdue   Health Maintenance Due   Topic Date Due     DEXA Q3 YR  11/16/2012     FOOT EXAM Q1 YEAR  09/04/2014     EYE EXAM Q1 YEAR  03/15/2015     OP ANNUAL INR REFERRAL  09/06/2017     ADVANCE DIRECTIVE PLANNING Q5 YRS  11/12/2017     MICROALBUMIN Q1 YEAR  06/21/2018       Clinical Pathway: None    Medication Management:  See medication list     Functional Status:  Dependent ADLs:: Independent  Dependent IADLs:: Cleaning, Transportation (drives sometimes)  Bed or wheelchair confined:: No  Mobility Status: Independent w/Device    Living Situation: Pt lives alone in home with her . She said it is a 2 story home but everything they need is on one level and they don't use the 2nd floor. [facility] talked about Assisted living and pt was adamant that they do not need assisted living.   Current living arrangement:: I live in a private home with spouse  Type of residence:: Private home - no stairs    Diet/Exercise/Sleep:  Inadequate nutrition (GOAL):: No  Food Insecurity: No  Tube Feeding: No  Inadequate activity/exercise (GOAL):: No  Significant changes in sleep pattern (GOAL): No    Transportation: Pt has rides to dialysis right now. She has done lots of research on her own about transportation options in the area and she states it is \"all too expensive, " "over $40 and I can't afford that.\" [facility] did suggest she check with [employer] who has reasonable rates. [facility] will send pt a letter with some resources. Pt did say she has a lady from her Religion that helps them some with transportation.   Transportation concerns (GOAL):: Yes  Transportation means:: Family, Regular car     Psychosocial:  Mental health DX:: Yes  Mental health DX how managed:: None  Mental health management concern (GOAL):: No  Informal Support system:: Children, Family     Financial/Insurance:   Financial/Insurance concerns (GOAL):: No  Medicare, BCBS     Resources and Interventions:  Current Resources: Pt states that she has a Senior laison with [facility]. She also mentions working with someone from [employer]     Equipment Currently Used at Home: walker, rolling, walker, standard, dressing device, raised toilet, shower chair, grab bar, wheelchair, manual, wheelchair, power    Advance Care Plan/Directive  Advanced Care Plans/Directives on file:: Yes  Type Advanced Care Plans/Directives: Advanced Directive - On File       Goals: n/a      Patient/Caregiver understanding: yes    Plan: [facility] will outreach to [facility]age Line to determine there involvement with pt. Pt adamantly declines any resources for ARIE at this time. [facility] will send pt access plan/letter with resources for transportation on it.     BON Rodríguez Clinic Care Coordinator  [facility]  7/24/2018 3:57 PM  [phone]      "

## 2018-07-24 NOTE — LETTER
[place] CARE COORDINATION  [facility]  [address] [place] [address]    July 26, 2018    Kathryn WEEKS Jocelyn  [address]  [place] [address]      Dear Kathryn,    I am a clinic care coordinator who works with Dheeraj Jj MD at [facility]. I wanted to thank you for spending the time to talk with me.  I wanted to provide you with my contact information so that you can call me with questions or concerns about your health care. I also wanted to provide you with the phone number for [facility] to see about transportation options. There number is [phone]. Below is a description of clinic care coordination and how I can further assist you.     The clinic care coordinator is a registered nurse and/or  who understand the health care system. The goal of clinic care coordination is to help you manage your health and improve access to the [facility] system in the most efficient manner. The registered nurse can assist you in meeting your health care goals by providing education, coordinating services, and strengthening the communication among your providers. The  can assist you with financial, behavioral, psychosocial, chemical dependency, counseling, and/or psychiatric resources.    Please feel free to contact me at [phone], with any questions or concerns. We at [facility] are focused on providing you with the highest-quality healthcare experience possible and that all starts with you.     Sincerely,     [facility] Rod[facility] Clinic Care Coordinator                                                        [facility]                                                      [phone]    Enclosed: I have enclosed a copy of a 24 Hour Access Plan. This has helpful phone numbers for you to call when needed. Please keep this in an easy to access place to use as needed.

## 2018-07-24 NOTE — LETTER
Health Care Home - Access Care Plan    About Me  Patient Name:  Kathryn Banks    YOB: 1940  Age:                             78 year old   Matt MRN:            [id number] Telephone Information:     Home Phone [phone]   Mobile Not on file.       Address:    [address] [place] [address] Email address:  [email or web address]      Emergency Contact(s)  Name Relationship Lgl Grd Work Phone Home Phone Mobile Phone   1. CHAVO BANKS Spouse  none [phone]   2. CARSON LUTZ* Daughter   [phone]   3. FRANCESCO BANKS Grandchild   [phone]             Health Maintenance: Routine Health maintenance Reviewed: Due/Overdue   Health Maintenance Due   Topic Date Due     DEXA Q3 YR  11/16/2012     FOOT EXAM Q1 YEAR  09/04/2014     EYE EXAM Q1 YEAR  03/15/2015     OP ANNUAL INR REFERRAL  09/06/2017     ADVANCE DIRECTIVE PLANNING Q5 YRS  11/12/2017     MICROALBUMIN Q1 YEAR  06/21/2018         My Access Plan  Medical Emergency 911   Questions or concerns during clinic hours Primary Clinic Line, I will call the clinic directly: [facility] - [phone]   24 Hour Appointment Line [phone] or  7-523 [place] ([phone]) (toll free)   24 Hour Nurse Line [phone] (toll free)   Questions or concerns outside clinic hours 24 Hour Appointment Line, I will call the after-hours on-call line:   [facility]] [phone] or [phone] ([phone]) (toll-free)   Preferred Urgent Care     Preferred Hospital [facility]  [phone]   Preferred Pharmacy [facility]     Behavioral Health Crisis Line The National Suicide Prevention Lifeline at 1-563.451.7307 or 911     My Care Team Members  Patient Care Team       Relationship Specialty Notifications Start End    Dheeraj Jj MD PCP - General Internal Medicine  1/9/18     Phone: [phone] Fax:  [phone] [address] [facility] [address]    Arash Olsen DO MD Orthopedics  1/20/14     Phone: [phone] Fax: [phone]         [address] [facility] [address]    Ruth Badillo, RN Nurse Coordinator Cardiology Admissions 8/29/17     Comment:  [facility] Nurse Coodinator    Phone: [phone] Pager: [phone] Fax: [phone]       Destiny Berrios NP Nurse Practitioner Cardiology Admissions 8/29/17     Comment:  [facility]    Phone: [phone] Pager: [phone] Fax: [phone]        [address] [facility] [address]    [facility] [place] [facility]  4/19/18     Phone: [phone]         Andra Hernandez LSW Clinic Care Coordinator Primary Care -  Admissions 7/20/18     Phone: [phone]                My Medical and Care Information  Problem List   Patient Active Problem List   Diagnosis     Restless leg syndrome     ANABLE on CPAP     Lipoma of other skin and subcutaneous tissue     Esophageal reflux     History of peptic ulcer disease     Kidney stone     Hyperlipidemia LDL goal <100     Advanced directives, counseling/discussion     CAD - NSTEMI, CABG x 5 in 2006, angio 2013 patent grafts except occluded graft to [facility]     Lymphedema     Osteoarthritis of hip     Essential hypertension with goal blood pressure less than 140/90     Pulmonary hypertension     Chronic heart failure (H) with preserved EF     Atrial flutter (H) - present 6/12     Proteinuria 2.1 g/g Cr     Acute on chronic renal failure -- Dialysis started 10/2017     Memory loss     Microscopic hematuria     Anemia in chronic kidney disease     Paroxysmal atrial fibrillation (H)     Anxiety     SBO -- S/P Lysis of Adhes 10/24/17     DM type 2 -- Hgb A1C 6.6 on 10/13/17     ESRD (end stage renal disease) on dialysis (H)     Physical deconditioning     Bilateral leg edema     Type 2 diabetes mellitus with complication (H)      Postprocedural hypotension     Morbid obesity (H)     Fall off motorized mobility scooter, initial encounter     Fall     Acute decompensated heart failure (H)     Contusion of right hip, initial encounter      Current Medications and Allergies:  See printed Medication Report

## 2018-07-31 ENCOUNTER — PATIENT OUTREACH (OUTPATIENT)
Dept: CARE COORDINATION | Facility: CLINIC | Age: 78
End: 2018-07-31

## 2018-07-31 ASSESSMENT — ACTIVITIES OF DAILY LIVING (ADL): DEPENDENT_IADLS:: CLEANING;TRANSPORTATION

## 2018-07-31 NOTE — PROGRESS NOTES
Clinic Care Coordination Contact  Care Team Conversations    [facility] spoke with Bel, a Return to  with [facility] ([facility]) for [facility]. In talking with pt she mentioned working with someone from [facility]. [facility] reached out today to Bel and she confirmed she is working with patient.     Bel was able to get pt set up with [employer] and some volunteer drivers to get her to her dialysis appointments. Bel said that service is just temporary however. Pt has some other resources for transportation options as well. [facility] also sent pt some transportation resources. Bel has visited the pt in her home. Pt has someone she has hired to help with housekeeping and yardwork. Bel said pt has been adamant that they are doing fine at home and don't need further help. Pt spouse also concurs with this when Bel has spoke with him. Bel did say they talked about a Senior apartment but not assisted living possibly and potentially closer to their children. She plans to talk with them about this further and will continue to follow up/work with pt. Bel will keep [facility] posted as well with any further needs or questions.     Plan: [facility] will outreach again with pt in approximately 3-4 weeks to see how she is doing and if needs any further resources.     [facility] Care Coordinator  [facility]  7/31/2018 4:18 PM  [phone]

## 2018-08-10 ENCOUNTER — TELEPHONE (OUTPATIENT)
Dept: ANTICOAGULATION | Facility: CLINIC | Age: 78
End: 2018-08-10

## 2018-08-10 NOTE — TELEPHONE ENCOUNTER
Notes from OV on 7/20/18:    She hasn't been on coumadin for some time, bruising and bleeding already.   She understands the risk and doesn't want coumadin or anti coagulation.  She hasn't been compliant with meds or INR so safer to be without the coumadin.     RN resolved episode.  Closing this encounter.  MARTY EvansN, RN

## 2018-08-12 NOTE — PROVIDER NOTIFICATION
Page out to Dr. Zhao to notify of arrival and need orders.    
Provider text paged on UA results.  
independent/needs device

## 2018-08-29 ENCOUNTER — TRANSFERRED RECORDS (OUTPATIENT)
Dept: HEALTH INFORMATION MANAGEMENT | Facility: CLINIC | Age: 78
End: 2018-08-29

## 2018-08-30 LAB — INR PPP: 1.2

## 2018-08-31 ENCOUNTER — PATIENT OUTREACH (OUTPATIENT)
Dept: CARE COORDINATION | Facility: CLINIC | Age: 78
End: 2018-08-31

## 2018-08-31 ASSESSMENT — ACTIVITIES OF DAILY LIVING (ADL): DEPENDENT_IADLS:: CLEANING;TRANSPORTATION

## 2018-08-31 NOTE — PROGRESS NOTES
Clinic Care Coordination Contact  [facility]/Voicemail    Referral Source: PCP  Clinical Data: Care Coordinator Outreach  Outreach attempted x 1.  Left message on voicemail with call back information and requested return call.  Plan: Care Coordinator mailed out care coordination introduction letter on 7/26/18. Care Coordinator will try to reach patient again in 3-5 business days.    [facility] Care Coordinator  [facility]  8/31/2018 11:31 AM  [phone]

## 2018-09-01 ENCOUNTER — DOCUMENTATION ONLY (OUTPATIENT)
Dept: CARE COORDINATION | Facility: CLINIC | Age: 78
End: 2018-09-01

## 2018-09-01 NOTE — PROGRESS NOTES
[facility] now requests orders and shares plan of care/discharge summaries for some patients through [employer].  Please REPLY TO THIS MESSAGE OR ROUTE BACK TO THE AUTHOR in order to give authorization for orders when needed.  This is considered a verbal order, you will still receive a faxed copy of orders for signature.  Thank you for your assistance in improving collaboration for our patients.    MD SUMMARY/PLAN OF CARE   Patient has script in home for Metoprolol 25mg 1/2 tab BID holding morning of am, this medication was not on discharge paper work, spoke with Dr. Suzanne Mckeon stated to hold for now and to see if you want patient back on this. BP today 158/80 with

## 2018-09-04 ENCOUNTER — TELEPHONE (OUTPATIENT)
Dept: INTERNAL MEDICINE | Facility: CLINIC | Age: 78
End: 2018-09-04

## 2018-09-04 ENCOUNTER — ANTICOAGULATION THERAPY VISIT (OUTPATIENT)
Dept: ANTICOAGULATION | Facility: CLINIC | Age: 78
End: 2018-09-04
Payer: COMMERCIAL

## 2018-09-04 LAB — INR PPP: 1.1

## 2018-09-04 PROCEDURE — 99207 ZZC NO CHARGE NURSE ONLY: CPT | Performed by: INTERNAL MEDICINE

## 2018-09-04 NOTE — MR AVS SNAPSHOT
Kathryn Banks   9/4/2018   Anticoagulation Therapy Visit    Description:  78 year old female   Provider:  Dheeraj Jj MD   Department:  Ph Anticoag           INR as of 9/4/2018     Today's INR 1.2! (8/30/2018)      Anticoagulation Summary as of 9/4/2018     INR goal 2.0-3.0    Today's INR 1.2! (8/30/2018)    Full warfarin instructions 9/4: 2.5 mg; Otherwise No maintenance plan    Next INR check 9/5/2018      Contact Numbers     Clinic Number:         September 2018 Details    Sun Mon Tue Wed Thu Fri Sat           1                 2               3               4      2.5 mg   See details      5            6               7               8                 9               10               11               12               13               14               15                 16               17               18               19               20               21               22                 23               24               25               26               27               28               29                 30                      Date Details   09/04 This INR check       Date of next INR:  9/5/2018         How to take your warfarin dose     To take:  2.5 mg Take 1 of the 2.5 mg tablets.

## 2018-09-04 NOTE — TELEPHONE ENCOUNTER
Reason for Call:  INR    Who is calling?  Home Care: [facility]    Phone number:  [phone]    Fax number:  NA    Name of caller: Kim    INR Value:  1.1    Are there any other concerns:  No    Can we leave a detailed message on this number? YES    Phone number patient can be reached at: [phone]      Call taken on 9/4/2018 at 4:18 PM by Zo Cardenas

## 2018-09-04 NOTE — PROGRESS NOTES
ANTICOAGULATION FOLLOW-UP CLINIC VISIT    Patient Name:  Kathryn Banks  Date:  9/4/2018  Contact Type:  IM with Argenis Daniel    SUBJECTIVE:     Patient Findings     Comments Pt was hospitalized at [facility] (per home care) for A fib (RVR) and coumadin was restarted. She had been off the past couple months due to pt refusal. Message sent to PCP.    Reason for Call:  INR     Who is calling?  Home Care: [facility]     Phone number:  [phone]     Fax number:  NA     Name of caller: Kim     INR Value:  1.1     Are there any other concerns:  No     Can we leave a detailed message on this number? YES     Phone number patient can be reached at: [phone]           OBJECTIVE    INR   Date Value Ref Range Status   08/30/2018 1.2  Final       ASSESSMENT / PLAN  INR assessment SUB    Recheck INR In: 1 DAY    INR Location Homecare INR      Anticoagulation Summary as of 9/4/2018     INR goal 2.0-3.0    Today's INR 1.1!    Warfarin maintenance plan No maintenance plan    Full warfarin instructions 9/4: 2.5 mg; 9/5: 1.25 mg; 9/6: 2.5 mg; Otherwise No maintenance plan    Next INR check 9/7/2018    Target end date       Anticoagulation Episode Summary     INR check location     Preferred lab     Send INR reminders to [facility]    Comments 2.5 mg tabs      Anticoagulation Care Providers     Provider Role Specialty Phone number    Dheeraj Jj MD [facility] Internal Medicine [phone]            See the Encounter Report to view Anticoagulation Flowsheet and Dosing Calendar (Go to Encounters tab in chart review, and find the Anticoagulation Therapy Visit)    Dosage adjustment made based on physician directed care plan.      Arlet Lee RN

## 2018-09-04 NOTE — TELEPHONE ENCOUNTER
INR addressed. Pt was hospitalized at [facility] (per home care) for A fib (RVR) and coumadin was restarted. She had been off the past couple months due to pt refusal. Will resume managing her INR at this point if you agree.     Nicki Calvillo, RN- Coumadin Clinic RN

## 2018-09-05 ENCOUNTER — DOCUMENTATION ONLY (OUTPATIENT)
Dept: CARE COORDINATION | Facility: CLINIC | Age: 78
End: 2018-09-05

## 2018-09-05 ENCOUNTER — TELEPHONE (OUTPATIENT)
Dept: INTERNAL MEDICINE | Facility: CLINIC | Age: 78
End: 2018-09-05

## 2018-09-05 DIAGNOSIS — I10 ESSENTIAL HYPERTENSION WITH GOAL BLOOD PRESSURE LESS THAN 140/90: Primary | ICD-10-CM

## 2018-09-05 RX ORDER — METOPROLOL TARTRATE 25 MG/1
12.5 TABLET, FILM COATED ORAL 2 TIMES DAILY
Qty: 90 TABLET | Refills: 1 | Status: SHIPPED | OUTPATIENT
Start: 2018-09-05 | End: 2018-01-01

## 2018-09-05 NOTE — TELEPHONE ENCOUNTER
Patient called to schedule an appointment for a hospital follow-up or appeared on a report showing that they were recently discharged from the hospital.    Patient was admitted to [facility]  Discharged date: 08/28/2018  Reason for hospital admission:  CHF Afib  Does patient have future appointment scheduled with provider? No  Date of future appointment:  Needs to be seen as soon as possible. Would like like it to be anytime other then Monday, Wednesday or Friday after 12:30 Due to dyalisis    This information will be used to help the care team plan for the patients upcoming visit.  The triage RN may determine that a follow up call is necessary and reach out to the patient via the phone number listed in the chart.     Please route this message on routine priority to the Triage RN pool.

## 2018-09-05 NOTE — TELEPHONE ENCOUNTER
"Hospital/TCU/ED for chronic condition Discharge Protocol    \"Hi, my name is Kelly Hernandez, a registered nurse, and I am calling from [facility].  I am calling to follow up and see how things are going for you after your recent emergency visit/hospital/TCU stay.\"    Tell me how you are doing now that you are home?\" Great.       Discharge Instructions    \"Let's review your discharge instructions.  What is/are the follow-up recommendations?  Pt. Response: I'm supposed to follow up with Dr. Jj in five days but they couldn't get me in until Wednesday.     \"Has an appointment with your primary care provider been scheduled?\"   Yes. (confirm)    \"When you see the provider, I would recommend that you bring your medications with you.\"    Medications    \"Tell me what changed about your medicines when you discharged?\"    Changes to chronic meds?    0-1    \"What questions do you have about your medications?\"    None     New diagnoses of heart failure, COPD, diabetes, or MI?    No          On warfarin: \"Were you given any recommendations for follow-up with the anticoagulation clinic?\" Yes - Anticoagulation clinic appointment is already scheduled at appropriate interval    Medication reconciliation completed? No, due to I don't believe the person I spoke to was actually the patient. It sounded to be the same person who answered and said the patient was busy. She gave short answers and was hurrying to get off the phone.   Was MTM referral placed (*Make sure to put transitions as reason for referral)?   No    Call Summary    \"What questions or concerns do you have about your recent visit and your follow-up care?\"     none    \"If you have questions or things don't continue to improve, we encourage you contact us through the main clinic number (give number).  Even if the clinic is not open, triage nurses are available 24/7 to help you.     We would like you to know that our clinic has extended hours (provide " "information).  We also have urgent care (provide details on closest location and hours/contact info)\"      \"Thank you for your time and take care!\"             "

## 2018-09-05 NOTE — PROGRESS NOTES
Patient does not currently have any Metoprolol in the home and BP elevated yesterday at home care visit. Wondering if a script can be sent to [facility] pharmacy in [place]?     Thanks    Marli REYNAGA, RN  [phone]

## 2018-09-07 ENCOUNTER — ANTICOAGULATION THERAPY VISIT (OUTPATIENT)
Dept: ANTICOAGULATION | Facility: CLINIC | Age: 78
End: 2018-09-07
Payer: COMMERCIAL

## 2018-09-07 ENCOUNTER — TELEPHONE (OUTPATIENT)
Dept: INTERNAL MEDICINE | Facility: CLINIC | Age: 78
End: 2018-09-07

## 2018-09-07 ENCOUNTER — PATIENT OUTREACH (OUTPATIENT)
Dept: CARE COORDINATION | Facility: CLINIC | Age: 78
End: 2018-09-07

## 2018-09-07 LAB — INR PPP: 1.2

## 2018-09-07 PROCEDURE — 99207 ZZC NO CHARGE NURSE ONLY: CPT | Performed by: INTERNAL MEDICINE

## 2018-09-07 ASSESSMENT — ACTIVITIES OF DAILY LIVING (ADL): DEPENDENT_IADLS:: CLEANING;TRANSPORTATION

## 2018-09-07 NOTE — MR AVS SNAPSHOT
Kathryn MICKY Banks   9/7/2018   Anticoagulation Therapy Visit    Description:  78 year old female   Provider:  Dheeraj Jj MD   Department:  Ph Anticoag           INR as of 9/7/2018     Today's INR 1.2!      Anticoagulation Summary as of 9/7/2018     INR goal 2.0-3.0    Today's INR 1.2!    Full warfarin instructions 9/7: 5 mg; 9/8: 5 mg; 9/9: 5 mg; Otherwise No maintenance plan    Next INR check 9/10/2018      Contact Numbers     Clinic Number:         September 2018 Details    Sun Mon Tue Wed Thu Fri Sat           1                 2               3               4               5               6               7      5 mg   See details      8      5 mg           9      5 mg         10            11               12               13               14               15                 16               17               18               19               20               21               22                 23               24               25               26               27               28               29                 30                      Date Details   09/07 This INR check       Date of next INR:  9/10/2018         How to take your warfarin dose     To take:  5 mg Take 2 of the 2.5 mg tablets.

## 2018-09-07 NOTE — PROGRESS NOTES
Clinic Care Coordination Contact    Clinic Care Coordination Contact  OUTREACH    Referral Information:  Referral Source: PCP    Primary Diagnosis: Psychosocial    Chief Complaint   Patient presents with     Clinic Care Coordination - [facility]     SW        [place] Utilization:   Clinic Utilization  Difficulty keeping appointments:: No  Compliance Concerns: No  No-Show Concerns: No  No PCP office visit in Past Year: No  Utilization    Last refreshed: 9/6/2018  8:27 AM:  No Show Count (past year) 4       Last refreshed: 9/6/2018  8:27 AM:  ED visits 5       Last refreshed: 9/6/2018  8:27 AM:  Hospital admissions 3          Current as of: 9/6/2018  8:27 AM           Clinical Concerns:  Patient Active Problem List   Diagnosis     Restless leg syndrome     ANABEL on CPAP     Lipoma of other skin and subcutaneous tissue     Esophageal reflux     History of peptic ulcer disease     Kidney stone     Hyperlipidemia LDL goal <100     Advanced directives, counseling/discussion     CAD - NSTEMI, CABG x 5 in 2006, angio 2013 patent grafts except occluded graft to [facility]     Lymphedema     Osteoarthritis of hip     Essential hypertension with goal blood pressure less than 140/90     Pulmonary hypertension     Chronic heart failure (H) with preserved EF     Atrial flutter (H) - present 6/12     Proteinuria 2.1 g/g Cr     Acute on chronic renal failure -- Dialysis started 10/2017     Memory loss     Microscopic hematuria     Anemia in chronic kidney disease     Paroxysmal atrial fibrillation (H)     Anxiety     SBO -- S/P Lysis of Adhes 10/24/17     DM type 2 -- Hgb A1C 6.6 on 10/13/17     ESRD (end stage renal disease) on dialysis (H)     Physical deconditioning     Bilateral leg edema     Type 2 diabetes mellitus with complication (H)     Postprocedural hypotension     Morbid obesity (H)     Fall off motorized mobility scooter, initial encounter     Fall     Acute decompensated heart failure (H)      Contusion of right hip, initial encounter       Current Medical Concerns:  Pt was recently hospitalized from 8/29-8/30/18 at [facility] due to atrial fib. Pt does have home care services with  home care. The home care RN was visiting her at the time of  CC phone call. Pt states she is feeling better and glad to be home. She declines any CC needs at this time.   Current Behavioral Concerns: Pt was in good spirits and states she is doing well. Pt said Bel with [employer] is continuing to work with them. Pt has enough assistance at home and says she doesn't need any more help/care.      Education Provided to patient: Told pt I could continue to check with her monthly to see how she is doing and offer resources. Pt states she has plenty of help coming in and plenty of resources. She does not want  CC checking back with her. She will call if has questions or needs resources.    Pain  Pain (GOAL):: No  Health Maintenance Reviewed: Due/Overdue   Health Maintenance Due   Topic Date Due     DEXA Q3 YR  11/16/2012     FOOT EXAM Q1 YEAR  09/04/2014     EYE EXAM Q1 YEAR  03/15/2015     OP ANNUAL INR REFERRAL  09/06/2017     ADVANCE DIRECTIVE PLANNING Q5 YRS  11/12/2017     MICROALBUMIN Q1 YEAR  06/21/2018     INFLUENZA VACCINE (1) 09/01/2018       Clinical Pathway: None    Medication Management:  See medication list. Pt states she has a home care nurse currently helping her with her medications.      Functional Status:  Dependent ADLs:: Independent  Dependent IADLs:: Cleaning, Transportation (drives sometimes)  Bed or wheelchair confined:: No  Mobility Status: Independent w/Device    Living Situation:  Current living arrangement:: I live in a private home with spouse  Type of residence:: Private home - no stairs    Diet/Exercise/Sleep:  Inadequate nutrition (GOAL):: No  Food Insecurity: No  Tube Feeding: No  Inadequate activity/exercise (GOAL):: No  Significant changes in sleep pattern (GOAL):  No    Transportation:  Transportation concerns (GOAL):: Yes  Transportation means:: Family, Regular car     Psychosocial:  Mental health DX:: Yes  Mental health DX how managed:: None  Mental health management concern (GOAL):: No  Informal Support system:: Children, Family     Financial/Insurance:   Financial/Insurance concerns (GOAL):: No  BCBS, Medicare     Resources and Interventions:  Current Resources:   List of home care services:: Skilled Nursing;   Community Resources: Housekeeping/Chore Agency, Transportation Services, Other ([employer])     Equipment Currently Used at Home: walker, rolling, walker, standard, dressing device, raised toilet, shower chair, grab bar, wheelchair, manual, wheelchair, power    Advance Care Plan/Directive  Advanced Care Plans/Directives on file:: Yes  Type Advanced Care Plans/Directives: Advanced Directive - On File        Goals: n/a      Patient/Caregiver understanding: yes - pt is understanding of how CC can assist but declines any needs.        Plan: Pt declines any further need for CC. She is doing fine at home she said. Pt is aware she can request a CC referral from her PCP if she wants further assistance. She plans to continue working with [facility].  CC will do no further outreaches.     [facility] Care Coordinator  [facility]  9/7/2018 3:31 PM  [phone]

## 2018-09-07 NOTE — PROGRESS NOTES
ANTICOAGULATION FOLLOW-UP CLINIC VISIT    Patient Name:  Kathryn Banks  Date:  9/7/2018  Contact Type:  Telephone/ [facility] - homecare    SUBJECTIVE:     Patient Findings     Positives Initiation of therapy           OBJECTIVE    INR   Date Value Ref Range Status   09/07/2018 1.2  Final       ASSESSMENT / PLAN  INR assessment SUB    Recheck INR In: 3 DAYS    INR Location Homecare INR      Anticoagulation Summary as of 9/7/2018     INR goal 2.0-3.0    Today's INR 1.2!    Warfarin maintenance plan No maintenance plan    Full warfarin instructions 9/7: 5 mg; 9/8: 5 mg; 9/9: 5 mg; Otherwise No maintenance plan    Next INR check 9/10/2018    Target end date       Anticoagulation Episode Summary     INR check location     Preferred lab     Send INR reminders to [facility]    Comments 2.5 mg tabs      Anticoagulation Care Providers     Provider Role Specialty Phone number    Dheeraj Jj MD [facility] Internal Medicine [phone]            See the Encounter Report to view Anticoagulation Flowsheet and Dosing Calendar (Go to Encounters tab in chart review, and find the Anticoagulation Therapy Visit)    Dosage adjustment made based on physician directed care plan.    Gianni Palumbo RN

## 2018-09-07 NOTE — TELEPHONE ENCOUNTER
Reason for Call:  INR    Who is calling?  Home Care: [facility]     Phone number:  [phone]    Fax number:  NA     Name of caller: Danuta    INR Value:  1.2    Are there any other concerns:  No    Can we leave a detailed message on this number? YES    Phone number patient can be reached at: Home number on file [phone] (home)      Call taken on 9/7/2018 at 2:03 PM by Zahira Diaz

## 2018-09-10 ENCOUNTER — TELEPHONE (OUTPATIENT)
Dept: INTERNAL MEDICINE | Facility: CLINIC | Age: 78
End: 2018-09-10

## 2018-09-10 ENCOUNTER — TELEPHONE (OUTPATIENT)
Dept: ANTICOAGULATION | Facility: CLINIC | Age: 78
End: 2018-09-10

## 2018-09-10 ENCOUNTER — ANTICOAGULATION THERAPY VISIT (OUTPATIENT)
Dept: ANTICOAGULATION | Facility: CLINIC | Age: 78
End: 2018-09-10
Payer: COMMERCIAL

## 2018-09-10 DIAGNOSIS — Z79.01 LONG TERM CURRENT USE OF ANTICOAGULANT THERAPY: ICD-10-CM

## 2018-09-10 DIAGNOSIS — I48.0 PAROXYSMAL ATRIAL FIBRILLATION (H): Primary | ICD-10-CM

## 2018-09-10 LAB — INR PPP: 1.8

## 2018-09-10 PROCEDURE — 99207 ZZC NO CHARGE NURSE ONLY: CPT | Performed by: INTERNAL MEDICINE

## 2018-09-10 NOTE — TELEPHONE ENCOUNTER
Please review and renew this patients INR referral, orders pending. Thank you!           Nicki Calvillo, RN- Coumadin Clinic RN

## 2018-09-10 NOTE — TELEPHONE ENCOUNTER
Reason for Call:  INR    Who is calling?  [facility]    Phone number:     Fax number:     Name of caller: Bharati    INR Value: 1.8    Are there any other concerns:  No    Can we leave a detailed message on this number? YES    Phone number patient can be reached at: Other phone number:  [phone]      Call taken on 9/10/2018 at 12:51 PM by Erica Boo

## 2018-09-10 NOTE — MR AVS SNAPSHOT
Kathryn MICKY Banks   9/10/2018   Anticoagulation Therapy Visit    Description:  78 year old female   Provider:  Dheeraj Jj MD   Department:  Ph Anticoag           INR as of 9/10/2018     Today's INR 1.8!      Anticoagulation Summary as of 9/10/2018     INR goal 2.0-3.0    Today's INR 1.8!    Full warfarin instructions 9/10: 5 mg; 9/11: 2.5 mg; 9/12: 2.5 mg; 9/13: 2.5 mg; Otherwise No maintenance plan    Next INR check 9/14/2018      Contact Numbers     Clinic Number:         September 2018 Details    Sun Mon Tue Wed Thu Fri Sat           1                 2               3               4               5               6               7               8                 9               10      5 mg   See details      11      2.5 mg         12      2.5 mg         13      2.5 mg         14            15                 16               17               18               19               20               21               22                 23               24               25               26               27               28               29                 30                      Date Details   09/10 This INR check       Date of next INR:  9/14/2018         How to take your warfarin dose     To take:  2.5 mg Take 1 of the 2.5 mg tablets.    To take:  5 mg Take 2 of the 2.5 mg tablets.

## 2018-09-10 NOTE — PROGRESS NOTES
ANTICOAGULATION FOLLOW-UP CLINIC VISIT    Patient Name:  Kathryn Banks  Date:  9/10/2018  Contact Type:  Telephone    SUBJECTIVE:     Patient Findings     Positives Initiation of therapy    Comments Reason for Call:  INR     Who is calling?  [facility]     Phone number:      Fax number:      Name of caller: Bharati     INR Value: 1.8     Are there any other concerns:  No     Can we leave a detailed message on this number? YES     Phone number patient can be reached at: Other phone number:  [phone]        Call taken on 9/10/2018 at 12:51 PM by Erica Boo           OBJECTIVE    INR   Date Value Ref Range Status   09/10/2018 1.8  Final       ASSESSMENT / PLAN  INR assessment SUB    Recheck INR In: 4 DAYS    INR Location Homecare INR      Anticoagulation Summary as of 9/10/2018     INR goal 2.0-3.0    Today's INR 1.8!    Warfarin maintenance plan No maintenance plan    Full warfarin instructions 9/10: 5 mg; 9/11: 2.5 mg; 9/12: 2.5 mg; 9/13: 2.5 mg; Otherwise No maintenance plan    Next INR check 9/14/2018    Target end date       Anticoagulation Episode Summary     INR check location     Preferred lab     Send [facility]R reminders to [facility]    Comments 2.5 mg tabs      Anticoagulation Care Providers     Provider Role Specialty Phone number    Dheeraj Jj MD [facility] Internal Medicine [phone]            See the Encounter Report to view Anticoagulation Flowsheet and Dosing Calendar (Go to Encounters tab in chart review, and find the Anticoagulation Therapy Visit)    Dosage adjustment made based on physician directed care plan.    Nicki Calvillo RN

## 2018-09-10 NOTE — TELEPHONE ENCOUNTER
Homecare calling to follow-up on message, please advise asap as Bharati is waiting in patients home.  Thank you,  Josephine Tejeda  Patient Representative

## 2018-09-14 ENCOUNTER — TELEPHONE (OUTPATIENT)
Dept: FAMILY MEDICINE | Facility: CLINIC | Age: 78
End: 2018-09-14

## 2018-09-14 ENCOUNTER — ANTICOAGULATION THERAPY VISIT (OUTPATIENT)
Dept: ANTICOAGULATION | Facility: CLINIC | Age: 78
End: 2018-09-14
Payer: COMMERCIAL

## 2018-09-14 LAB — INR PPP: 1.8

## 2018-09-14 NOTE — MR AVS SNAPSHOT
Kathryn MICKY Banks   9/14/2018   Anticoagulation Therapy Visit    Description:  78 year old female   Provider:  Dheeraj Jj MD   Department:  Ph Anticoag           INR as of 9/14/2018     Today's INR 1.8!      Anticoagulation Summary as of 9/14/2018     INR goal 2.0-3.0    Today's INR 1.8!    Full warfarin instructions 9/14: 5 mg; 9/15: 2.5 mg; 9/16: 5 mg; 9/17: 2.5 mg; Otherwise No maintenance plan    Next INR check 9/18/2018      Contact Numbers     Clinic Number:         September 2018 Details    Sun Mon Tue Wed Thu Fri Sat           1                 2               3               4               5               6               7               8                 9               10               11               12               13               14      5 mg   See details      15      2.5 mg           16      5 mg         17      2.5 mg         18            19               20               21               22                 23               24               25               26               27               28               29                 30                      Date Details   09/14 This INR check       Date of next INR:  9/18/2018         How to take your warfarin dose     To take:  2.5 mg Take 1 of the 2.5 mg tablets.    To take:  5 mg Take 2 of the 2.5 mg tablets.

## 2018-09-14 NOTE — PROGRESS NOTES
ANTICOAGULATION FOLLOW-UP CLINIC VISIT    Patient Name:  Kathryn Banks  Date:  9/14/2018  Contact Type:  Telephone/ Bharati - homecare    SUBJECTIVE:     Patient Findings     Positives Unexplained INR or factor level change    Comments Reason for Call:  INR     Who is calling?  [facility]: [facility]     Phone number:  [phone]     Fax number:  NA     Name of caller: Bharati     INR Value:  1.8     Are there any other concerns:  Yes: please call as soon as possible, Bharati is waiting at patients home.     Can we leave a detailed message on this number? YES     Phone number patient can be reached at: Home number on file [phone] (home)        Call taken on 9/14/2018 at 12:38 PM by Joyce Cummings           OBJECTIVE    INR   Date Value Ref Range Status   09/14/2018 1.8  Final       ASSESSMENT / PLAN  INR assessment SUB    Recheck INR In: 4 DAYS    INR Location Homecare INR      Anticoagulation Summary as of 9/14/2018     INR goal 2.0-3.0    Today's INR 1.8!    Warfarin maintenance plan No maintenance plan    Full warfarin instructions 9/14: 5 mg; 9/15: 2.5 mg; 9/16: 5 mg; 9/17: 2.5 mg; Otherwise No maintenance plan    Next INR check 9/18/2018    Target end date       Anticoagulation Episode Summary     INR check location     Preferred lab     Send INR reminders to [facility]I    Comments 2.5 mg tabs      Anticoagulation Care Providers     Provider Role Specialty Phone number    Dheeraj Jj MD [facility] Internal Medicine [phone]            See the Encounter Report to view Anticoagulation Flowsheet and Dosing Calendar (Go to Encounters tab in chart review, and find the Anticoagulation Therapy Visit)    Dosage adjustment made based on physician directed care plan.    Gianni Palumbo RN

## 2018-09-14 NOTE — TELEPHONE ENCOUNTER
Danuta calling again. She would like a call back ASAP as she is waiting to set up meds at pt's home.   Thank you,  Zahira Diaz- Pt Rep.

## 2018-09-14 NOTE — TELEPHONE ENCOUNTER
Reason for Call:  INR    Who is calling?  [facility]: [facility]    Phone number:  [phone]    Fax number:  NA    Name of caller: Bharati    INR Value:  1.8    Are there any other concerns:  Yes: please call as soon as possible, Bharati is waiting at patients home.    Can we leave a detailed message on this number? YES    Phone number patient can be reached at: Home number on file [phone] (home)      Call taken on 9/14/2018 at 12:38 PM by Joyce Cummings

## 2018-09-17 NOTE — TELEPHONE ENCOUNTER
Reason for Call:  Same Day Appointment, Requested Provider:  Dheeraj Jj MD    PCP: Dheeraj Jj    Reason for visit: Hospital F/U appt, Retaining fluid and CHF - Discharged about 1 month ago, was supposed to follow up in 5 days    Duration of symptoms: Pt spoke with a triage nurse and thought she was going to send a work in message for her to be seen with Dr Jj    Have you been treated for this in the past? Yes    Additional comments:     Can we leave a detailed message on this number? YES    Phone number patient can be reached at: Home number on file [phone] (home)    Best Time: any    Call taken on 9/17/2018 at 3:32 PM by Zo Cardenas

## 2018-09-18 NOTE — PROGRESS NOTES
ANTICOAGULATION FOLLOW-UP CLINIC VISIT    Patient Name:  Kathryn Banks  Date:  9/18/2018  Contact Type:  Telephone/ Bharati - homecare    SUBJECTIVE:     Patient Findings     Positives Unexplained INR or factor level change    Comments Reason for Call:  INR     Who is calling?  Nursing Home: [facility]     Phone number:  [phone]     Fax number:  NA     Name of caller: Bharati     INR Value:  3.5     Are there any other concerns:  No     Can we leave a detailed message on this number? YES     Phone number patient can be reached at:         Call taken on 9/18/2018 at 11:48 AM by Zo Cardenas           OBJECTIVE    INR   Date Value Ref Range Status   09/18/2018 3.5  Final       ASSESSMENT / PLAN  INR assessment SUPRA    Recheck INR In: 2 DAYS    INR Location Homecare INR      Anticoagulation Summary as of 9/18/2018     INR goal 2.0-3.0    Today's INR 3.5!    Warfarin maintenance plan No maintenance plan    Full warfarin instructions 9/18: 2.5 mg; 9/19: 2.5 mg; Otherwise No maintenance plan    Next INR check 9/20/2018    Target end date       Anticoagulation Episode Summary     INR check location     Preferred lab     Send INR reminders to [facility]    Comments 2.5 mg tabs      Anticoagulation Care Providers     Provider Role Specialty Phone number    Dheeraj Jj MD [facility] Internal Medicine [phone]            See the Encounter Report to view Anticoagulation Flowsheet and Dosing Calendar (Go to Encounters tab in chart review, and find the Anticoagulation Therapy Visit)    Dosage adjustment made based on physician directed care plan.    Gianni Palumbo RN

## 2018-09-18 NOTE — MR AVS SNAPSHOT
Kathryn MICKY Banks   9/18/2018   Anticoagulation Therapy Visit    Description:  78 year old female   Provider:  Dheeraj Jj MD   Department:  Ph Anticoag           INR as of 9/18/2018     Today's INR 3.5!      Anticoagulation Summary as of 9/18/2018     INR goal 2.0-3.0    Today's INR 3.5!    Full warfarin instructions 9/18: 2.5 mg; 9/19: 2.5 mg; Otherwise No maintenance plan    Next INR check 9/20/2018      Contact Numbers     Clinic Number:         September 2018 Details    Sun Mon Tue Wed Thu Fri Sat           1                 2               3               4               5               6               7               8                 9               10               11               12               13               14               15                 16               17               18      2.5 mg   See details      19      2.5 mg         20            21               22                 23               24               25               26               27               28               29                 30                      Date Details   09/18 This INR check       Date of next INR:  9/20/2018         How to take your warfarin dose     To take:  2.5 mg Take 1 of the 2.5 mg tablets.

## 2018-09-18 NOTE — TELEPHONE ENCOUNTER
Reason for Call:  INR    Who is calling?  [facility]ursing Home: [facility]     Phone number:  [phone]    Fax number:  NA    Name of caller: Bharati    INR Value:  3.5    Are there any other concerns:  No    Can we leave a detailed message on this number? YES    Phone number patient can be reached at:       Call taken on 9/18/2018 at 11:48 AM by Zo Cardenas

## 2018-09-19 NOTE — TELEPHONE ENCOUNTER
Forms received from [facility]   on 9/19/18 for med rec.  Forms with RN to review medications.  Orders pended for provider to sign.    Forms given to PCP for signature on .

## 2018-09-20 NOTE — TELEPHONE ENCOUNTER
Bharati from [facility] would like to request a refill of patients coumadin,   Please send to [facility]  If you need to contact Bharati, please call her at [phone]

## 2018-09-20 NOTE — PROGRESS NOTES
ANTICOAGULATION FOLLOW-UP CLINIC VISIT    Patient Name:  Kathryn Banks  Date:  9/20/2018  Contact Type:  Telephone    SUBJECTIVE:     Patient Findings     Comments Reason for Call:  INR     Who is calling?  Home Care: [facility]     Phone number:       Fax number:       Name of caller: Bharati     INR Value: 3.1, waiting in home to set up meds. Nurse states she needs to know by noon today, patient has to leave her home then.      Are there any other concerns:  No     Can we leave a detailed message on this number? YES     Phone number patient can be reached at:         Call taken on 9/20/2018 at 11:36 AM by Erica Boo           OBJECTIVE    INR   Date Value Ref Range Status   09/20/2018 3.1  Final       ASSESSMENT / PLAN  INR assessment THER    Recheck INR In: 5 DAYS    INR Location Homecare INR      Anticoagulation Summary as of 9/20/2018     INR goal 2.0-3.0    Today's INR 3.1!    Warfarin maintenance plan 5 mg (2.5 mg x 2) on Mon, Fri; 2.5 mg (2.5 mg x 1) all other days    Full warfarin instructions 5 mg on Mon, Fri; 2.5 mg all other days    Weekly warfarin total 22.5 mg    Plan last modified Nicki Calvillo RN (9/20/2018)    Next INR check 9/25/2018    Target end date       Anticoagulation Episode Summary     INR check location     Preferred lab     Send INR reminders to [facility]    Comments 2.5 mg tabs      Anticoagulation Care Providers     Provider Role Specialty Phone number    Dheeraj Jj MD [facility] Internal Medicine [phone]            See the Encounter Report to view Anticoagulation Flowsheet and Dosing Calendar (Go to Encounters tab in chart review, and find the Anticoagulation Therapy Visit)    Dosage adjustment made based on physician directed care plan.    Nicki Calvillo, RN

## 2018-09-20 NOTE — TELEPHONE ENCOUNTER
Reason for Call:  INR    Who is calling?  Home Care: [facility]    Phone number:      Fax number:      Name of caller: Bharati    INR Value: 3.1, waiting in home to set up meds. Nurse states she needs to know by noon today, patient has to leave her home then.     Are there any other concerns:  No    Can we leave a detailed message on this number? YES    Phone number patient can be reached at:       Call taken on 9/20/2018 at 11:36 AM by Erica Boo

## 2018-09-20 NOTE — MR AVS SNAPSHOT
Kathryn MICKY Banks   9/20/2018   Anticoagulation Therapy Visit    Description:  78 year old female   Provider:  Dheeraj Jj MD   Department:  Ph Anticoag           INR as of 9/20/2018     Today's INR 3.1!      Anticoagulation Summary as of 9/20/2018     INR goal 2.0-3.0    Today's INR 3.1!    Full warfarin instructions 5 mg on Mon, Fri; 2.5 mg all other days    Next INR check 9/25/2018      Contact Numbers     Clinic Number:         September 2018 Details    Sun Mon Tue Wed Thu Fri Sat           1                 2               3               4               5               6               7               8                 9               10               11               12               13               14               15                 16               17               18               19               20      2.5 mg   See details      21      5 mg         22      2.5 mg           23      2.5 mg         24      5 mg         25            26               27               28               29                 30                      Date Details   09/20 This INR check       Date of next INR:  9/25/2018         How to take your warfarin dose     To take:  2.5 mg Take 1 of the 2.5 mg tablets.    To take:  5 mg Take 2 of the 2.5 mg tablets.

## 2018-09-20 NOTE — TELEPHONE ENCOUNTER
Please let Bharati homecare nurse know that those weight changes have to go to the dialysis center. Dialysis is the only way to adjust her weight now.

## 2018-09-20 NOTE — TELEPHONE ENCOUNTER
See Anticoagulation visit encounter (INR addressed). Dr. Jj, HC nurse wanted you to know pt's weight is up to 146 lb from 142 lb. No other symptoms/concerns.        Nicki Calvillo RN- [facility] RN

## 2018-09-20 NOTE — TELEPHONE ENCOUNTER
Prescription approved per [facility] Refill Protocol.    Gianni Palumbo RN, BSN

## 2018-09-21 NOTE — TELEPHONE ENCOUNTER
Message left for Alva from Home Care-Is Kathryn taking Diltiazem? No on epic list. Maybe prescribed from [facility]?    Christina Hernandez RN

## 2018-09-26 NOTE — TELEPHONE ENCOUNTER
Kathryn is taking 300 mg Diltiazem daily which was started at her last hospitalization to [facility], she was discharged from the hospital on 8-30-18, she just returned to the hospital on 9-24-18 for Afib, which occurred during dialysis.

## 2018-09-26 NOTE — TELEPHONE ENCOUNTER
Left message for [facility] Rn re: Diltiazem .    Christina Hernandez RN

## 2018-09-28 NOTE — TELEPHONE ENCOUNTER
Reason for Call: Request for an order or referral:    Order or referral being requested[facility] one week, 2 visits for 3 wks, 1 visit for 2 wks, 3 visits as needed.  Home health aide 2 visits for 5 weeks    Date needed: as soon as possible    Has the patient been seen by the PCP for this problem? YES    Additional comments: asking for orders today    Phone number Patient can be reached at:  Other phone number: [phone]  Bharati*    Best Time:      Can we leave a detailed message on this number?  YES    Call taken on 9/28/2018 at 3:19 PM by Josephine Tejeda

## 2018-09-28 NOTE — PROGRESS NOTES
ANTICOAGULATION FOLLOW-UP CLINIC VISIT    Patient Name:  Kathryn Banks  Date:  9/28/2018  Contact Type:  Telephone/ Bharati  nurse- [phone]    SUBJECTIVE:     Patient Findings     Positives Intentional hold of therapy (per HC nurse, hospital held Coumadin on Wednesday 9/26)    Comments Bharati at  calls reporting that the pt INR was 1.7. Routed to INR nurse to reivew and advise.   Per Bharati- pt was admitted to [facility] 9/24-9/26. She is unsure what she got for Coumadin on Wed, it was held on Thursday due to elevated INR, and pt is unsure what she took last night.   Zo Davis RN             OBJECTIVE    INR   Date Value Ref Range Status   09/28/2018 1.7  Final       ASSESSMENT / PLAN  INR assessment SUB    Recheck INR In: 3 DAYS    INR Location Homecare INR      Anticoagulation Summary as of 9/28/2018     INR goal 2.0-3.0    Today's INR 1.7!    Warfarin maintenance plan 5 mg (2.5 mg x 2) on Mon, Fri; 2.5 mg (2.5 mg x 1) all other days    Full warfarin instructions 5 mg on Mon, Fri; 2.5 mg all other days    Weekly warfarin total 22.5 mg    Plan last modified Nicki Calvillo, RN (9/20/2018)    Next INR check 10/1/2018    Target end date       Anticoagulation Episode Summary     INR check location     Preferred lab     Send INR reminders to [facility]    Comments 2.5 mg tabs      Anticoagulation Care Providers     Provider Role Specialty Phone number    Dheeraj Jj MD [facility] Internal Medicine [phone]            See the Encounter Report to view Anticoagulation Flowsheet and Dosing Calendar (Go to Encounters tab in chart review, and find the Anticoagulation Therapy Visit)    Dosage adjustment made based on physician directed care plan.      Zo Davis RN

## 2018-09-28 NOTE — MR AVS SNAPSHOT
Kathryn MICKY Banks   9/28/2018   Anticoagulation Therapy Visit    Description:  78 year old female   Provider:  Dheeraj Jj MD   Department:  Ph Anticoag           INR as of 9/28/2018     Today's INR 1.7!      Anticoagulation Summary as of 9/28/2018     INR goal 2.0-3.0    Today's INR 1.7!    Full warfarin instructions 5 mg on Mon, Fri; 2.5 mg all other days    Next INR check 10/1/2018      Contact Numbers     Clinic Number:         September 2018 Details    Sun Mon Tue Wed Thu Fri Sat           1                 2               3               4               5               6               7               8                 9               10               11               12               13               14               15                 16               17               18               19               20               21               22                 23               24               25               26               27               28      5 mg   See details      29      2.5 mg           30      2.5 mg                Date Details   09/28 This INR check               How to take your warfarin dose     To take:  2.5 mg Take 1 of the 2.5 mg tablets.    To take:  5 mg Take 2 of the 2.5 mg tablets.           October 2018 Details    Sun Mon Tue Wed Thu Fri Sat      1            2               3               4               5               6                 7               8               9               10               11               12               13                 14               15               16               17               18               19               20                 21               22               23               24               25               26               27                 28               29               30               31                   Date Details   No additional details    Date of next INR:  10/1/2018         How to take your warfarin dose     To take:   5 mg Take 2 of the 2.5 mg tablets.

## 2018-10-01 NOTE — TELEPHONE ENCOUNTER
She was just in with a ifb and need cardioversion.  If symptomatic with lightheaded and sob then needs to go to the ER.

## 2018-10-01 NOTE — ED PROVIDER NOTES
History     Chief Complaint   Patient presents with     Atrial Fib     HPI  Kathryn Banks is a 78 year old female who presents to the ED today via EMS with c/o feeling weak, light headed. Patient has significant medical hx including ESRD, on dialysis, M/W/F.  Did dialyze her full run this morning, had a run of atrial fibrillation which resolved.  Patient went home and had return of fast heart rate and her blood pressure was 70 systolic, patient presents here for ongoing management.  Patient was just admitted to [facility] for dyspnea/hypoxia secondary to what was assumed to be fluid overload.  She was also in atrial fibrillation with RVR at that time, failed medical management and was successfully cardioverted.  Patient was admitted for two days and discharged on the 26th.  Patient arrives here with really no complaints.  She denies any chest pain/jaw pain, feeling unwell.  She is on a fluid restriction, makes about 1 cup of urine daily.  Patient does have hx of ablation as well as bypass in the past, most recent echo with ejection fraction of 60% with significant pulmonary hypertension. Patient is on warfarin, subtherapeutic this morning.       Hospitalist note from [facility]:    Admission 9/24/18  Discharge 9/26/2018    BRIEF HOSPITAL COURSE:   This 78 y.o. female has end stage renal disease that is followed by Dr. Dhillon at [facility]. She has difficulty maintaining her baseline weight and was admitted with dyspnea and hypoxia with severe hypovolemia -- 11 kg above EDW. This was accompanied by new atrial fibrillation with rapid ventricular response that resolved with cardioversion and correction of the hypervolemia. She also was noted to have anemia of ESRD and metabolic bone disease related to CKD, as well as hypertension. She was seen by nephrology and improved with serial hemodialysis. calcitriol was added to her regimen.       Problem List:    Patient Active Problem List    Diagnosis Date Noted     Long  term current use of anticoagulant therapy 09/10/2018     Priority: Medium     Contusion of right hip, initial encounter 03/23/2018     Priority: Medium     ESRD (end stage renal disease) on dialysis (H) 11/06/2017     Priority: Medium     Physical deconditioning 11/06/2017     Priority: Medium     Bilateral leg edema 11/06/2017     Priority: Medium     DM type 2 -- Hgb A1C 6.6 on 10/13/17 10/29/2017     Priority: Medium     SBO -- S/P Lysis of Adhes 10/24/17 10/22/2017     Priority: Medium     Paroxysmal atrial fibrillation (H) 10/20/2017     Priority: Medium     Anxiety 10/20/2017     Priority: Medium     Anemia in chronic kidney disease 09/20/2017     Priority: Medium     Microscopic hematuria 06/21/2017     Priority: Medium     Acute on chronic renal failure -- Dialysis started 10/2017 06/12/2017     Priority: Medium     Memory loss 06/12/2017     Priority: Medium     Proteinuria 2.1 g/g Cr 06/02/2017     Priority: Medium     Chronic heart failure (H) with preserved EF 05/31/2017     Priority: Medium     Atrial flutter (H) - present 6/12 05/31/2017     Priority: Medium     Pulmonary hypertension 05/24/2017     Priority: Medium     Postprocedural hypotension 03/09/2017     Priority: Medium     Fall 02/10/2017     Priority: Medium     Type 2 diabetes mellitus with complication (H) 02/03/2017     Priority: Medium     Fall off motorized mobility scooter, initial encounter 02/03/2017     Priority: Medium     Acute decompensated heart failure (H) 02/03/2017     Priority: Medium     Overview:   HFpEF with EF 60-65% by echo 2/4/17.       Essential hypertension with goal blood pressure less than 140/90 09/13/2016     Priority: Medium     Osteoarthritis of hip 04/29/2015     Priority: Medium     Lymphedema 11/10/2014     Priority: Medium     Health Care Home 01/20/2014     Priority: Medium     State Tier Level:  Tier 3  Status:  Closed  Care Coordinator:  Pinky Oden if needed in the future.     See Letters for [facility] Care  Plan         CAD - NSTEMI, CABG x 5 in 2006, angio 2013 patent grafts except occluded graft to PL 02/04/2013     Priority: Medium     Hyperlipidemia LDL goal <100 10/31/2010     Priority: Medium     Kidney stone 12/29/2009     Priority: Medium     Morbid obesity (H) 05/14/2008     Priority: Medium     Esophageal reflux 03/28/2006     Priority: Medium     Lipoma of other skin and subcutaneous tissue 09/07/2004     Priority: Medium     ANABEL on CPAP      Priority: Medium     Advanced directives, counseling/discussion 11/12/2012     Priority: Low     Advance Directive received and scanned. Click on Code in the patient header to view. 11/12/2012          History of peptic ulcer disease 10/17/2007     Priority: Low     Problem list name updated by automated process. Provider to review       Restless leg syndrome      Priority: Low        Past Medical History:    Past Medical History:   Diagnosis Date     Carpal tunnel syndrome      Coronary atherosclerosis of native coronary artery 2006     OBSTRUCTIVE SLEEP APNEA      Osteoarthrosis, unspecified whether generalized or localized, unspecified site      Other and combined forms of senile cataract 2000     Other and unspecified hyperlipidemia      RESTLESS LEGS SYNDROME      TENOSYNOV HAND/WRIST NEC 6/30/2006     Type II or unspecified type diabetes mellitus without mention of complication, not stated as uncontrolled 2001     Typical atrial flutter (H) 01/17/2007     Unspecified essential hypertension        Past Surgical History:    Past Surgical History:   Procedure Laterality Date     ANGIOPLASTY       C APPENDECTOMY       C CABG, VEIN, FIVE  12/06    SVG x 4 and LIMA to LAD     C SOTO W/O FACETEC FORAMOT/DSKC 1/2 VRT SEG, LUMBAR  1968     C REMV CATARACT INTRACAP,INSERT LENS      Bilateral     C TOTAL ABDOM HYSTERECTOMY  1995     - fibroids     C VAGOTOMY/PYLOROPLASTY,SELECT  1970     COLONOSCOPY  12/3/2007     COLONOSCOPY  1/17/2014    Procedure: COLONOSCOPY;   Colonoscopy;  Surgeon: Zack Stearns MD;  Location: [facility]     CREATE GRAFT LOOP ARTERIOVENOUS UPPER EXTREMITY Left 2018    Procedure: CREATE GRAFT ARTERIOVENOUS UPPER EXTREMITY;  Left Upper Arm Arteriovenous Graft Creation, Anesthesia Block ;  Surgeon: Cassie Cardenas MD;  Location: [facility]     CYSTOSCOPY  09    [facility]     ENDOSCOPY  2000    Upper GI     HC COLONOSCOPY THRU STOMA, DIAGNOSTIC  10/7/04    poor prep, repeat in 2-3 years     HC COLONOSCOPY W/WO BRUSH/WASH  10/31/07    Repeat-poor quality prep     HC REMOVAL OF OVARY/TUBE(S)      Salpingo-Oophorectomy, Unilateral     HC REVISE MEDIAN N/CARPAL TUNNEL SURG      Carpal tunnel release     HC UGI ENDOSCOPY DIAG W BIOPSY  10/31/07     HC UGI ENDOSCOPY, SIMPLE EXAM  12/3/2007     LAPAROTOMY, LYSIS ADHESIONS, COMBINED N/A 10/24/2017    Procedure: COMBINED LAPAROTOMY, LYSIS ADHESIONS;  REPAIR FOCAL ILEAL SMALL BOWEL PERFERATION, LYSIS OF ADHESIONS.;  Surgeon: Rashard Zafar MD;  Location: [facility]     OPEN REDUCTION INTERNAL FIXATION HIP NAILING Left 7/3/2016    Procedure: OPEN REDUCTION INTERNAL FIXATION HIP NAILING;  Surgeon: Lake Schulz MD;  Location: [facility]       Family History:    Family History   Problem Relation Age of Onset     Diabetes Father      AODM     Neurologic Disorder Father      Parkinson's     Blood Disease Father       from blood clot from leg to lung immediately after hip fracture     Diabetes Paternal Grandmother      adult onset     Diabetes Maternal Grandmother      adult onset     Hypertension No family hx of      Cerebrovascular Disease No family hx of        Social History:  Marital Status:   [2]  Social History   Substance Use Topics     Smoking status: Former Smoker     Years: 10.00     Quit date: 1969     Smokeless tobacco: Never Used     Alcohol use 0.0 oz/week     0 Standard drinks or equivalent per week      Comment: 1/2 a beer once a month        Medications:      ACETAMINOPHEN PO    B Complex-C-Folic Acid (RENAL) 1 MG CAPS   calcium acetate (PHOSLO) 667 MG CAPS capsule   diltiazem 300 MG 24 hr capsule   loperamide (IMODIUM) 2 MG capsule   metoprolol tartrate (LOPRESSOR) 25 MG tablet   NEPHROCAPS (TRIPHROCAPS) 1 MG capsule   nitroGLYcerin (NITROSTAT) 0.4 MG sublingual tablet   ORDER FOR DME   pentoxifylline (TRENTAL) 400 MG CR tablet   pramipexole (MIRAPEX) 0.125 MG tablet   pravastatin (PRAVACHOL) 40 MG tablet   senna-docusate (SENOKOT-S;PERICOLACE) 8.6-50 MG per tablet   warfarin (COUMADIN) 2.5 MG tablet         Review of Systems   Neurological: Positive for weakness and light-headedness.   All other systems reviewed and are negative.      Physical Exam   BP: 132/73  Heart Rate: 117  Temp: 99.8  F (37.7  C)  Resp: 16  Weight: 67.5 kg (148 lb 12.8 oz)  SpO2: (!) 89 %      Physical Exam   Constitutional: She is oriented to person, place, and time. No distress.   Thin appearing, pale female sitting on the ED cart in NAD   HENT:   Head: Normocephalic.   Dry mucus membranes   Neck: Normal range of motion.   Cardiovascular:   No murmur heard.  Irregularly irregular rhythm, faint distal pulses, tachycardic   Pulmonary/Chest: Effort normal and breath sounds normal. No respiratory distress. She exhibits no tenderness.   Left lower lobe, crackles   Neurological: She is alert and oriented to person, place, and time.   Skin: She is not diaphoretic. There is pallor.   Left lower leg wrapped in dressing, dialysis shunt with thrill to left arm       ED Course     ED Course     Procedures         EKG Interpretation:      Interpreted by Emperatriz Sethi  Time reviewed: 1505  Symptoms at time of EKG: None   Rhythm: atrial fibrillation - controlled  Rate: 114  Axis: Normal  Ectopy: none  Conduction: normal  ST Segments/ T Waves: No acute ischemic changes  Q Waves: none  Clinical Impression: atrial fibrillation, paroxysmal         EKG Interpretation:      Interpreted by Emperatriz Sethi  Time reviewed:  1910  Symptoms at time of EKG: None   Rhythm: Normal sinus   Rate: 81  Axis: Normal  Ectopy: None  Conduction: Normal  ST Segments/ T Waves: No ST-T wave changes and No acute ischemic changes  Q Waves: Nonspecific  Comparison to prior: Converted to NSR  Clinical Impression: normal EKG        Results for orders placed or performed during the hospital encounter of 10/01/18   XR Chest 2 Views    Narrative    CHEST TWO VIEWS    10/1/2018 5:01 PM     HISTORY: Atrial fibrillation, crackles left lower lobe.    COMPARISON: Chest x-ray 10/12/2017.      Impression    IMPRESSION: Two views of the chest are performed. There is a new,  small, probably loculated left pleural effusion not evident on prior  exam. Trace amount of right pleural fluid is also new. Heart size is  within normal limits. Sternotomy sutures are noted. Right-sided  central venous line has been removed since prior exam. No evidence of  pneumothorax. No infiltrate or consolidation to suggest pneumonia.    ANA BALDWIN MD   CBC with platelets differential   Result Value Ref Range    WBC 10.8 4.0 - 11.0 10e9/L    RBC Count 4.14 3.8 - 5.2 10e12/L    Hemoglobin 11.5 (L) 11.7 - 15.7 g/dL    Hematocrit 37.2 35.0 - 47.0 %    MCV 90 78 - 100 fl    MCH 27.8 26.5 - 33.0 pg    MCHC 30.9 (L) 31.5 - 36.5 g/dL    RDW 18.0 (H) 10.0 - 15.0 %    Platelet Count 200 150 - 450 10e9/L    Diff Method Automated Method     % Neutrophils 82.2 %    % Lymphocytes 8.1 %    % Monocytes 7.0 %    % Eosinophils 1.5 %    % Basophils 0.6 %    % Immature Granulocytes 0.6 %    Nucleated RBCs 0 0 /100    Absolute Neutrophil 8.9 (H) 1.6 - 8.3 10e9/L    Absolute Lymphocytes 0.9 0.8 - 5.3 10e9/L    Absolute Monocytes 0.8 0.0 - 1.3 10e9/L    Absolute Basophils 0.1 0.0 - 0.2 10e9/L    Abs Immature Granulocytes 0.1 0 - 0.4 10e9/L    Absolute Nucleated RBC 0.0    D dimer quantitative   Result Value Ref Range    D Dimer 1.8 (H) 0.0 - 0.50 ug/ml FEU   Comprehensive metabolic panel   Result Value Ref Range     Sodium 141 133 - 144 mmol/L    Potassium 4.3 3.4 - 5.3 mmol/L    Chloride 104 94 - 109 mmol/L    Carbon Dioxide 33 (H) 20 - 32 mmol/L    Anion Gap 4 3 - 14 mmol/L    Glucose 143 (H) 70 - 99 mg/dL    Urea Nitrogen 13 7 - 30 mg/dL    Creatinine 1.75 (H) 0.52 - 1.04 mg/dL    GFR Estimate 28 (L) >60 mL/min/1.7m2    GFR Estimate If Black 34 (L) >60 mL/min/1.7m2    Calcium 7.0 (L) 8.5 - 10.1 mg/dL    Bilirubin Total 0.4 0.2 - 1.3 mg/dL    Albumin 1.9 (L) 3.4 - 5.0 g/dL    Protein Total 5.5 (L) 6.8 - 8.8 g/dL    Alkaline Phosphatase 202 (H) 40 - 150 U/L    ALT 20 0 - 50 U/L    AST 24 0 - 45 U/L   Troponin I   Result Value Ref Range    Troponin I ES 0.041 0.000 - 0.045 ug/L   Nt probnp inpatient (BNP)   Result Value Ref Range    N-Terminal Pro BNP Inpatient 07193 (H) 0 - 1800 pg/mL   CRP inflammation   Result Value Ref Range    CRP Inflammation 41.5 (H) 0.0 - 8.0 mg/L   INR   Result Value Ref Range    INR 1.25 (H) 0.86 - 1.14   Partial thromboplastin time   Result Value Ref Range    PTT 33 22 - 37 sec     *Note: Due to a large number of results and/or encounters for the requested time period, some results have not been displayed. A complete set of results can be found in Results Review.       Medications   sodium chloride 0.9% infusion ( Intravenous Rate/Dose Change 10/1/18 3787)   diltiazem (CARDIZEM) 125 mg in sodium chloride 0.9 % 125 mL infusion (5 mg/hr Intravenous New Bag 10/1/18 1626)   diltiazem (CARDIZEM) injection 20 mg (20 mg Intravenous Given 10/1/18 1486)       Assessments & Plan (with Medical Decision Making)  Kathryn is a 78 year old female, significant and complex medical hx including ESRD, on dialysis, paroxysmal atrial fibrillation, Type 2 DM, CHF, pulmonary hypertension, and CAD who presents to the ED today via EMS in atrial fibrillation. Please refer to HPI and focused exam.  Patient on arrival here is chest pain free, she is in atrial fibrillation, rate 117 per EKG, no ischemic findings.  Blood  pressure is stable.  Patient endorses weakness and fatigue, dialysis run completed this morning.  Noted that weight today is 149 #'s, in reviewing her discharged summary from [facility], her weight on the 26th was 133#'s.  Patient denies any sob, she does have basilar crackles to left lower lobe.  No significant lower extremity edema.  JVD present.    PIV established, patient was given a bolus of Diltiazem followed by drip.  CXR obtained, confirms a small left loculated pleural effusion. No signs of pneumonia. Blood work today confirms subtherapeutic INR of 1.25.  BNP is 73,911, no prior value with recent hospitalization that I can find on care everywhere.   Troponin is 0.041.  CRP 41.5.  CBC returns with stable HGB of 11.5, left shift.  CMP with finding consistent for patient, creatinine of 1.75, GFR of 28.  Patient's calcium of 7, albumin low at 1.9, Total protein of 5.5 with alk phos of 202.  D dimer elevated, likely from ESRD. Patient is not hypoxic or short of breath.   I am hesitant to cardiovert patient given her subtherapeutic INR, I discussed her case with cardiology, Dr. Keller at [facility], agrees patient would need BRANDEN.  Given patient's need for dialysis and ongoing need for diltiazem drip at this time, she will require transfer to a larger facility.  Patient now states she would like to be transferred to [facility] instead of [facility].  1815-[facility] is full, patient agreeable to [facility] as family prefers she go here.  Spoke with Dr. Simpson who has graciously accepted patient.  Patient has been updated and is agreeable to plan of care.  She remains on a Diltiazem drip with varying rate, currently 116.   Patient staffed in the ED with Dr. Adan.  Patient will be transferred via ALS.   1910-Patient converted, EKG NSR.  Discussed with patient, she would like to go home now.  Nursing staff updated and bed at [facility] cancelled.  We will arrange for a ride for patient to go home.  Patient  instructed to take 5 mg of Warfarin today and tomorrow.  Diltiazem drip discontinued. Patient agreeable and discharged in stable condition.      I have reviewed the nursing notes.    I have reviewed the findings, diagnosis, plan and need for follow up with the patient.    New Prescriptions    No medications on file       Final diagnoses:   Paroxysmal atrial fibrillation (H)   Subtherapeutic anticoagulation   ESRD (end stage renal disease) on dialysis (H)       10/1/2018   [facility] EMERGENCY DEPARTMENT     Emperatriz Sethi, RHODA CNP  10/01/18 1823       Emperatriz Sethi APRN CNP  10/01/18 1915

## 2018-10-01 NOTE — ED AVS SNAPSHOT
[facility] Emergency Department    [address]     [place] [address]    Phone:  [phone]    Fax:  [phone]                                       Kathryn Banks   MRN: [id number]    Department:  [facility] Emergency Department   Date of Visit:  10/1/2018           Patient Information     Date Of Birth          1940        Your diagnoses for this visit were:     Paroxysmal atrial fibrillation (H) Converted while in the ED    Subtherapeutic anticoagulation     ESRD (end stage renal disease) on dialysis (H)        You were seen by Emperatriz Sethi APRN CNP.      Follow-up Information     Follow up with Dheeraj Jj MD In 1 week.    Specialty:  Internal Medicine    Contact information:    [address] [address]  [phone]          Follow up with [facility] Emergency Department.    Specialty:  EMERGENCY MEDICINE    Why:  If symptoms worsen    Contact information:    [address] [place] [address] [phone]    Additional information:    From [address]: Exit at [address] on [address] side of [place]. Turn right on [address]. Turn left at stoplight on [address]. [facility] will be in view two blocks ahead        Discharge Instructions         Having Chemical Cardioversion  Cardioversion is a procedure that is done to return your heartbeat to a normal rhythm. It s done when the heart is beating very fast or irregular. This is called an arrhythmia. The procedure uses medicine to change the heart s rhythm. The medicine is given through an IV or by mouth. This can be done as a planned procedure or an an emergency procedure when you have unstable heart rhythm irregularities.  What to tell your healthcare provider  Tell your healthcare provider about all the medicines you take. This includes over-the-counter medicines such as ibuprofen. It also includes vitamins, herbs, and other supplements, and any  recreational drug use.  Tests before your procedure  You may need blood tests before the procedure. This is to make sure the procedure is safe for you. Blood tests can also help identify causes for irregular heart rhythms such as abnormal levels of electrolytes and thyroid hormone levels. A cardioversion may not be successful if there are conditions causing abnormal test results that need to be treated too.      And you may have a transesophageal echocardiography test before the procedure. This test is a special kind of ultrasound. A thin, flexible tube is put down your throat and into your esophagus. There, the tube is close to your heart. It lets your healthcare provider see if you have any blood clots. Your cardioversion will be delayed if a clot is found.  Getting ready for your procedure  Talk with your healthcare provider how to get ready for your procedure. Follow his or her instructions about what medicines to take before the procedure. This includes medicines that prevent arrhythmias. Don t stop taking any medicine unless your healthcare provider tells you to.  If you re at a higher risk for blood clots, your healthcare provider may want you to take blood-thinner medicine. You may take this several weeks before and after the procedure.  Make sure to:    Ask a family member or friend to take you home from the hospital.    Not eat or drink after midnight the night before your procedure, unless your healthcare provider says it s OK.    Follow all other instructions from your healthcare provider.  You will be asked to sign a consent form that gives your permission to do the procedure. Read the form carefully. Ask questions if something is not clear.  On the day of your procedure  The procedure may be done in a hospital or an outpatient facility that has continuous heart rhythm monitoring. A healthcare provider will give you medicine. It may be given through a vein in your arm or hand (IV). Or you may take it by  mouth.  The type of medicine is based on your type of arrhythmia and your overall health. Examples of medicines that are used include:    Flecainide, dofetilide, propafenone, amiodarone, or ibutilide (for atrial fibrillation or atrial flutter)    Adenosine, verapamil, diltiazem, or metroprolol (for supraventricular tachycardia)    Amiodarone (for ventricular tachycardia)  After your procedure  Your heart rate and rhythm will be monitored for a few hours to see if the conversion worked.   Chemical cardioversion may work very quickly. Or it may take hours to work. You may need an electrical cardioversion if the medicine did not work. Your healthcare team will create a new care plan for you if this occurs.  Ask your healthcare provider about medicine side effects to watch for.  You may need to take blood thinner medicine, such as warfarin, for several weeks after the procedure. Take this exactly as directed. You may also need to take a medicine to prevent arrhythmias. Take all your medicines exactly as directed.  Follow-up care  Follow up with your healthcare provider, as advised.   When to call your healthcare provider  Call your healthcare provider right away if you have any of these:    Trouble breathing or chest pain (call 911)    Dizziness    Fainting    Severe side effects from the medicine, including uncontrolled bleeding if you are on blood thinning medicines    Worsening palpitations   Date Last Reviewed: 12/1/2017 2000-2017 [employer]. [address] [place] [address]. All rights reserved. This information is not intended as a substitute for professional medical care. Always follow your healthcare professional's instructions.          Your next 10 appointments already scheduled     Oct 02, 2018 10:00 AM CDT   [facility]    [address] [place] [address] [phone]           Bring a  current list of meds and any records pertaining to this visit. For Physicals, please bring immunization records and any forms needing to be filled out. Please arrive 10 minutes early to complete paperwork.              24 Hour Appointment Hotline       To make an appointment at any [facility], call [phone]). If you don't have a family doctor or clinic, we will help you find one. [facility] clinics are conveniently located to serve the needs of you and your family.             Review of your medicines      Our records show that you are taking the medicines listed below. If these are incorrect, please call your family doctor or clinic.        Dose / Directions Last dose taken    ACETAMINOPHEN PO   Dose:  1000 mg        Take 1,000 mg by mouth 3 times daily And give 500 mg PO every 12 hours as needed   Refills:  0        calcium acetate 667 MG Caps capsule   Commonly known as:  PHOSLO   Dose:  667 mg        Take 667 mg by mouth 3 times daily (with meals) And 2 times daily with snacks   Refills:  0        diltiazem 300 MG 24 hr capsule   Dose:  300 mg        Take 300 mg by mouth daily   Refills:  0        loperamide 2 MG capsule   Commonly known as:  IMODIUM   Dose:  2 mg   Quantity:  40 capsule        Take 1 capsule (2 mg) by mouth daily Every Monday, Wednesday and Friday prior to dialysis   Refills:  1        metoprolol tartrate 25 MG tablet   Commonly known as:  LOPRESSOR   Dose:  12.5 mg   Quantity:  90 tablet        Take 0.5 tablets (12.5 mg) by mouth 2 times daily   Refills:  1        nitroGLYcerin 0.4 MG sublingual tablet   Commonly known as:  NITROSTAT   Dose:  0.4 mg   Quantity:  25 tablet        Place 1 tablet (0.4 mg) under the tongue every 5 minutes as needed for chest pain   Refills:  0        order for DME   Quantity:  1 Device        Equipment being ordered: cpap machine, mask, humidifier, tubing and filters   Refills:  0        pentoxifylline 400 MG CR tablet   Commonly known as:   TRENtal   Dose:  400 mg   Quantity:  90 tablet        Take 1 tablet (400 mg) by mouth daily   Refills:  0        pramipexole 0.125 MG tablet   Commonly known as:  MIRAPEX   Dose:  0.25 mg   Quantity:  90 tablet        Take 2 tablets (0.25 mg) by mouth At Bedtime   Refills:  3        pravastatin 40 MG tablet   Commonly known as:  PRAVACHOL   Dose:  40 mg   Quantity:  90 tablet        Take 1 tablet (40 mg) by mouth daily   Refills:  3        * RENAL 1 MG Caps   Dose:  1 capsule        Take 1 capsule by mouth daily   Refills:  0        * NEPHROCAPS 1 MG capsule   Dose:  1 capsule   Quantity:  90 capsule        Take 1 capsule by mouth daily   Refills:  3        senna-docusate 8.6-50 MG per tablet   Commonly known as:  SENOKOT-S;PERICOLACE   Dose:  2 tablet        Take 2 tablets by mouth 2 times daily as needed for constipation   Refills:  0        warfarin 2.5 MG tablet   Commonly known as:  COUMADIN   Quantity:  40 tablet        Take 5 mg Mon, Fri and 2.5 mg all other days or as directed by the coumadin clinic.   Refills:  1        * Notice:  This list has 2 medication(s) that are the same as other medications prescribed for you. Read the directions carefully, and ask your doctor or other care provider to review them with you.            Procedures and tests performed during your visit     Procedure/Test Number of Times Performed    CBC with platelets differential 1    CRP inflammation 1    Cardiac Continuous Monitoring 1    Comprehensive metabolic panel 1    D dimer quantitative 1    EKG 12-lead, tracing only 2    INR 1    Nt probnp inpatient (BNP) 1    Partial thromboplastin time 1    Peripheral IV catheter 1    Troponin I 1    XR Chest 2 Views 1      Orders Needing Specimen Collection     None      Pending Results     No orders found from 9/29/2018 to 10/2/2018.            Pending Culture Results     No orders found from 9/29/2018 to 10/2/2018.            Pending Results Instructions     If you had any lab results  that were not finalized at the time of your Discharge, you can call the ED Lab Result RN at [phone]. You will be contacted by this team for any positive Lab results or changes in treatment. The nurses are available 7 days a week from 10A to 6:30P.  You can leave a message 24 hours per day and they will return your call.        Thank you for choosing [facility]       Thank you for choosing [facility] for your care. Our goal is always to provide you with excellent care. Hearing back from our patients is one way we can continue to improve our services. Please take a few minutes to complete the written survey that you may receive in the mail after you visit with us. Thank you!        [employer] Information     [employer] gives you secure access to your electronic health record. If you see a primary care provider, you can also send messages to your care team and make appointments. If you have questions, please call your primary care clinic.  If you do not have a primary care provider, please call [phone] and they will assist you.        Care EveryWhere ID     This is your Care EveryWhere ID. This could be used by other organizations to access your [facility] medical records  [id number]        Equal Access to Services     CAROLINE BLAND : Tl Garcia, ryan pham, tay avila. So [facility] [phone].    ATENCIÓN: Si habla español, tiene a medrano disposición servicios gratuitos de asistencia lingüística. Jonathaname al [phone].    We comply with applicable federal civil rights laws and [place] laws. We do not discriminate on the basis of race, color, national origin, age, disability, sex, sexual orientation, or gender identity.            After Visit Summary       This is your record. Keep this with you and show to your community pharmacist(s) and doctor(s) at your next visit.

## 2018-10-01 NOTE — ED AVS SNAPSHOT
[facility] Emergency Department    [address] [place] [address]    Phone:  [phone]    Fax:  [phone]                                       Kathryn Banks   MRN: [id number]    Department:  [facility] Emergency Department   Date of Visit:  10/1/2018           After Visit Summary Signature Page     I have received my discharge instructions, and my questions have been answered. I have discussed any challenges I see with this plan with the nurse or doctor.    ..........................................................................................................................................  Patient/Patient Representative Signature      ..........................................................................................................................................  Patient Representative Print Name and Relationship to Patient    ..................................................               ................................................  Date                                   Time    ..........................................................................................................................................  Reviewed by Signature/Title    ...................................................              ..............................................  Date                                               Time          22EPIC Rev 08/18

## 2018-10-01 NOTE — MR AVS SNAPSHOT
Kathryn MICKY Banks   10/1/2018   Anticoagulation Therapy Visit    Description:  78 year old female   Provider:  Dheeraj Jj MD   Department:  Ph Anticoag           INR as of 10/1/2018     Today's INR 1.3!      Anticoagulation Summary as of 10/1/2018     INR goal 2.0-3.0    Today's INR 1.3!    Full warfarin instructions 10/1: 7.5 mg; 10/2: 5 mg; Otherwise 5 mg on Mon, Fri; 2.5 mg all other days    Next INR check 10/4/2018      Contact Numbers     Clinic Number:         October 2018 Details    Sun Mon Tue Wed Thu Fri Sat      1      7.5 mg   See details      2      5 mg         3      2.5 mg         4            5               6                 7               8               9               10               11               12               13                 14               15               16               17               18               19               20                 21               22               23               24               25               26               27                 28               29               30               31                   Date Details   10/01 This INR check       Date of next INR:  10/4/2018         How to take your warfarin dose     To take:  2.5 mg Take 1 of the 2.5 mg tablets.    To take:  5 mg Take 2 of the 2.5 mg tablets.    To take:  7.5 mg Take 3 of the 2.5 mg tablets.

## 2018-10-01 NOTE — PROGRESS NOTES
ANTICOAGULATION FOLLOW-UP CLINIC VISIT    Patient Name:  Kathryn Banks  Date:  10/1/2018  Contact Type:  Telephone/ [facility] - homecare    SUBJECTIVE:     Patient Findings     Positives Missed doses (Missed Friday's dose)    Comments Reason for Call:  INR     Who is calling?  Home Care: in the home waiting to set up meds     Phone number:  [phone]     Fax number:       Name of caller: [facility]     INR Value:  1.3     Are there any other concerns:  Yes: Missed fridays dosing     Can we leave a detailed message on this number? YES     Phone number patient can be reached at: Home number on file [phone] (home)        Call taken on 10/1/2018 at 1:14 PM by Sienna Lafleur              OBJECTIVE    INR   Date Value Ref Range Status   10/01/2018 1.3  Final       ASSESSMENT / PLAN  INR assessment SUB    Recheck INR In: 3 DAYS    INR Location [facility] INR      Anticoagulation Summary as of 10/1/2018     INR goal 2.0-3.0    Today's INR 1.3!    Warfarin maintenance plan 5 mg (2.5 mg x 2) on Mon, Fri; 2.5 mg (2.5 mg x 1) all other days    Full warfarin instructions 10/1: 7.5 mg; 10/2: 5 mg; Otherwise 5 mg on Mon, Fri; 2.5 mg all other days    Weekly warfarin total 22.5 mg    Plan last modified Nicki Calvillo, RN (9/20/2018)    Next INR check 10/4/2018    Target end date       Anticoagulation Episode Summary     INR check location     Preferred lab     Send INR reminders to [facility]    Comments 2.5 mg tabs      Anticoagulation Care Providers     Provider Role Specialty Phone number    Dheeraj Jj MD [facility] Internal Medicine [phone]            See the Encounter Report to view Anticoagulation Flowsheet and Dosing Calendar (Go to Encounters tab in chart review, and find the Anticoagulation Therapy Visit)    Dosage adjustment made based on physician directed care plan.    Gianni Palumbo, RN

## 2018-10-01 NOTE — TELEPHONE ENCOUNTER
"Reason for Call:  Other     Detailed comments: Pt felt faint in dialysis this morning.  BP was 70/\"something\" according to pt.  HomeCare nurse is with her now and BP is 122/60, irregular heartbeat at 115 and feeling fatigued.  Blood sugar 204 after eating lunch.    Phone Number Patient can be reached at: [phone]    Best Time: any    Can we leave a detailed message on this number? YES    Call taken on 10/1/2018 at 1:29 PM by Zo Cardenas      "

## 2018-10-01 NOTE — TELEPHONE ENCOUNTER
Routing to team as patient reps do not place orders.  Thank you,  Josephine Tejeda  Patient Representative

## 2018-10-01 NOTE — ED NOTES
Bed: ED06  Expected date: 10/1/18  Expected time: 3:01 PM  Means of arrival: Ambulance  Comments:  STianaHNilda Shaw

## 2018-10-01 NOTE — ED TRIAGE NOTES
Pt comes in via EMS for complaints of syncope related to her A.fib. Pt states she feels fatigued now. Pt was in a few days ago and had a cardioversion performed.

## 2018-10-01 NOTE — TELEPHONE ENCOUNTER
Reason for Call:  INR    Who is calling?  Home Care: in the home waiting to set up meds    Phone number:  [phone]    Fax number:      Name of caller: [facility]    INR Value:  1.3    Are there any other concerns:  Yes: Missed fridays dosing    Can we leave a detailed message on this number? YES    Phone number patient can be reached at: Home number on file [phone] (home)      Call taken on 10/1/2018 at 1:14 PM by Sienna Lafleur

## 2018-10-02 NOTE — DISCHARGE INSTRUCTIONS
Having Chemical Cardioversion  Cardioversion is a procedure that is done to return your heartbeat to a normal rhythm. It s done when the heart is beating very fast or irregular. This is called an arrhythmia. The procedure uses medicine to change the heart s rhythm. The medicine is given through an IV or by mouth. This can be done as a planned procedure or an an emergency procedure when you have unstable heart rhythm irregularities.  What to tell your healthcare provider  Tell your healthcare provider about all the medicines you take. This includes over-the-counter medicines such as ibuprofen. It also includes vitamins, herbs, and other supplements, and any recreational drug use.  Tests before your procedure  You may need blood tests before the procedure. This is to make sure the procedure is safe for you. Blood tests can also help identify causes for irregular heart rhythms such as abnormal levels of electrolytes and thyroid hormone levels. A cardioversion may not be successful if there are conditions causing abnormal test results that need to be treated too.      And you may have a transesophageal echocardiography test before the procedure. This test is a special kind of ultrasound. A thin, flexible tube is put down your throat and into your esophagus. There, the tube is close to your heart. It lets your healthcare provider see if you have any blood clots. Your cardioversion will be delayed if a clot is found.  Getting ready for your procedure  Talk with your healthcare provider how to get ready for your procedure. Follow his or her instructions about what medicines to take before the procedure. This includes medicines that prevent arrhythmias. Don t stop taking any medicine unless your healthcare provider tells you to.  If you re at a higher risk for blood clots, your healthcare provider may want you to take blood-thinner medicine. You may take this several weeks before and after the procedure.  Make sure  to:    Ask a family member or friend to take you home from the hospital.    Not eat or drink after midnight the night before your procedure, unless your healthcare provider says it s OK.    Follow all other instructions from your healthcare provider.  You will be asked to sign a consent form that gives your permission to do the procedure. Read the form carefully. Ask questions if something is not clear.  On the day of your procedure  The procedure may be done in a hospital or an outpatient facility that has continuous heart rhythm monitoring. A healthcare provider will give you medicine. It may be given through a vein in your arm or hand (IV). Or you may take it by mouth.  The type of medicine is based on your type of arrhythmia and your overall health. Examples of medicines that are used include:    Flecainide, dofetilide, propafenone, amiodarone, or ibutilide (for atrial fibrillation or atrial flutter)    Adenosine, verapamil, diltiazem, or metroprolol (for supraventricular tachycardia)    Amiodarone (for ventricular tachycardia)  After your procedure  Your heart rate and rhythm will be monitored for a few hours to see if the conversion worked.   Chemical cardioversion may work very quickly. Or it may take hours to work. You may need an electrical cardioversion if the medicine did not work. Your healthcare team will create a new care plan for you if this occurs.  Ask your healthcare provider about medicine side effects to watch for.  You may need to take blood thinner medicine, such as warfarin, for several weeks after the procedure. Take this exactly as directed. You may also need to take a medicine to prevent arrhythmias. Take all your medicines exactly as directed.  Follow-up care  Follow up with your healthcare provider, as advised.   When to call your healthcare provider  Call your healthcare provider right away if you have any of these:    Trouble breathing or chest pain (call  911)    Dizziness    Fainting    Severe side effects from the medicine, including uncontrolled bleeding if you are on blood thinning medicines    Worsening palpitations   Date Last Reviewed: 12/1/2017 2000-2017 [employer]. [address] [place] [address], [place] [address]. All rights reserved. This information is not intended as a substitute for professional medical care. Always follow your healthcare professional's instructions.

## 2018-10-02 NOTE — ED NOTES
"Took pt off 3L oxygen to see how she would do. Pt has never needed oxygen or been told she has needed oxygen before. \"this is a new experience for me. Pt sats dropped to 88% on room air. Placed back on 2L nasal canula  "

## 2018-10-02 NOTE — PROGRESS NOTES
SUBJECTIVE:   Kathryn Banks is a 78 year old female who presents to clinic today for the following health issues:      Hospital Follow-up Visit:    Hospital/Nursing Home/IP Rehab Facility: [facility]  Date of Admission: 9/24/18  Date of Discharge: 9/26/18  Reason(s) for Admission: Acute Respiratory Failure  End stage kidney disease            Problems taking medications regularly:  None       Medication changes since discharge: None       Problems adhering to non-medication therapy:  None    Summary of hospitalization:  See outside records, reviewed and scanned  Diagnostic Tests/Treatments reviewed.  Follow up needed: none  Other Healthcare Providers Involved in Patient s Care:         Homecare  Update since discharge: fluctuating course.     Post Discharge Medication Reconciliation: discharge medications reconciled and changed, per note/orders (see AVS).  Plan of care communicated with patient     Coding guidelines for this visit:  Type of Medical   Decision Making Face-to-Face Visit       within 7 Days of discharge Face-to-Face Visit        within 14 days of discharge   Moderate Complexity [id number]   High Complexity [id number]          She has had a few episodes of atrial fibrillation, had cardioversion at [facility] on the 24th and then last night cardioverted on her own here.  She is already on coumadin, had an ablation.  Was on amiodarone but note from De 2017 says was too expensive.      She doesn't know when she is in a fib, no symptoms.  Sometimes sob, has home oxygen.      Weight is up 10 pounds, dialysis hasn't taken that off.  Trying to pull off maximum water, she is drinking too much.  Always feels dry.  Urinates some, twice a day.    INR was 1.25 yetserday, dose was increased.         Past Medical History:   Diagnosis Date     Carpal tunnel syndrome      Coronary atherosclerosis of native coronary artery 2006    5 vessel CABG     OBSTRUCTIVE SLEEP APNEA     using CPAP     Osteoarthrosis, unspecified  "whether generalized or localized, unspecified site     generalized arthritis, particularly in her hands and feet         Other and combined forms of senile cataract 2000    Bilateral     Other and unspecified hyperlipidemia      RESTLESS LEGS SYNDROME      TENOSYNOV HAND/WRIST NEC 6/30/2006     Type II or unspecified type diabetes mellitus without mention of complication, not stated as uncontrolled 2001    Diabetes mellitus/pt is diet controlled with weight loss     Typical atrial flutter (H) 01/17/2007    ablation 6/7/2017     Unspecified essential hypertension      Current Outpatient Prescriptions   Medication     B Complex-C-Folic Acid (RENAL) 1 MG CAPS     calcium acetate (PHOSLO) 667 MG CAPS capsule     diltiazem 300 MG 24 hr capsule     loperamide (IMODIUM) 2 MG capsule     metoprolol tartrate (LOPRESSOR) 25 MG tablet     NEPHROCAPS (TRIPHROCAPS) 1 MG capsule     pentoxifylline (TRENTAL) 400 MG CR tablet     pramipexole (MIRAPEX) 0.125 MG tablet     pravastatin (PRAVACHOL) 40 MG tablet     senna-docusate (SENOKOT-S;PERICOLACE) 8.6-50 MG per tablet     warfarin (COUMADIN) 2.5 MG tablet     ACETAMINOPHEN PO     nitroGLYcerin (NITROSTAT) 0.4 MG sublingual tablet     ORDER FOR DME     [DISCONTINUED] metoprolol tartrate (LOPRESSOR) 25 MG tablet     No current facility-administered medications for this visit.      Social History   Substance Use Topics     Smoking status: Former Smoker     Years: 10.00     Quit date: 1/1/1969     Smokeless tobacco: Never Used     Alcohol use 0.0 oz/week     0 Standard drinks or equivalent per week      Comment: 1/2 a beer once a month     Review of Systems  Constitutional-No fevers, chills, or weight changes..  Cardiac-No chest pain or palpitations and Exertional SOB.  Respiratory-No cough, sob, or hemoptysis.  GI-No nausea, vomitting, diarrhea, constipation, or blood in the stool.    Physical Exam  /66  Pulse 96  Temp 98.1  F (36.7  C) (Temporal)  Resp 16  Ht 5' 4\" (1.626 " m)  Wt 147 lb (66.7 kg)  SpO2 95%  BMI 25.23 kg/m2  General Appearance-healthy, alert, no distress  Cardiac-regular rate and rhythm  with normal S1, S2 ; no murmur, rub or gallops  Lungs-mild to moderate decreased air movement  Extremities-1+ pitting edema in her lower legs.    ASSESSMENT:  78-year-old woman who has multiple medical problems including end-stage renal disease on dialysis, congestive heart failure, coronary artery disease, paroxysmal atrial fibrillation.  Most recently she has been in the [facility] with congestive heart failure, shortness of breath and was found to have A. fib.  She was cardioverted there.  She subsequently went home on the 26.  She ended up having some A. fib at dialysis and again at home with home care nurse went to the ER yesterday.  She cardioverted in the ER.  Unfortunately her Coumadin is down to 1.3 but this is re-dose to get her back to the normal levels by the Coumadin clinic.  We will increase her metoprolol from 12.5-25 mg twice daily continue her diltiazem 300 mg daily, I will refer her back to Dr. Pugh in electrophysiology who did her ablation in 2017.  She may do better with a repeat ablation in the future, or maybe she needs to be just rate controlled and could be on something such as tilt of the digoxin.    Patient additionally has a pleural effusion on the left may be some on the right difficult with her renal disease she needs to get her weight down there trying to pull more fluid, given her session of 6 hours instead of 3.  She needs to limit the amount she drinks we can pull fluid off of her and help her shortness of breath and overall symptoms.    Electronically signed by Dheeraj Jj MD      Electronically signed by Dheeraj Jj MD

## 2018-10-02 NOTE — MR AVS SNAPSHOT
After Visit Summary   10/2/2018    Kathryn Banks    MRN: [id number]           Patient Information     Date Of Birth          1940        Visit Information        Provider Department      10/2/2018 10:00 AM Dheeraj Jj MD [facility]         Follow-ups after your visit        Your next 10 appointments already scheduled     Oct 02, 2018 10:00 AM CDT   Office Visit with [facility]aj Jj MD   [facility]    [address] [place] [id number] [phone]           Bring a current list of meds and any records pertaining to this visit. For Physicals, please bring immunization records and any forms needing to be filled out. Please arrive 10 minutes early to complete paperwork.              Who to contact     If you have questions or need follow up information about today's clinic visit or your schedule please contact [facility] directly at [phone].  Normal or non-critical lab and imaging results will be communicated to you by MyChart, letter or phone within 4 business days after the clinic has received the results. If you do not hear from us within 7 days, please contact the clinic through [employer] or phone. If you have a critical or abnormal lab result, we will notify you by phone as soon as possible.  Submit refill requests through [employer] or call your pharmacy and they will forward the refill request to us. Please allow 3 business days for your refill to be completed.          Additional Information About Your Visit        MyChart Information     [employer] gives you secure access to your electronic health record. If you see a primary care provider, you can also send messages to your care team and make appointments. If you have questions, please call your primary care clinic.  If you do not have a primary care provider, please call [phone] and they will assist you.        Care EveryWhere ID     This is  "your Care EveryWhere ID. This could be used by other organizations to access your [place] medical records  [id number]        Your Vitals Were     Pulse Temperature Respirations Height Pulse Oximetry BMI (Body Mass Index)    96 98.1  F (36.7  C) (Temporal) 16 5' 4\" (1.626 m) 95% 25.23 kg/m2       Blood Pressure from Last 3 Encounters:   10/02/18 138/66   10/01/18 117/46   07/20/18 124/62    Weight from Last 3 Encounters:   10/02/18 147 lb (66.7 kg)   10/01/18 148 lb 12.8 oz (67.5 kg)   07/20/18 137 lb (62.1 kg)              Today, you had the following     No orders found for display       Primary Care Provider Office Phone # Fax #    Dheeraj Jj [phone] [address] [facility] [address]        Equal Access to Services     [facility] : Hadii pascual ku hadasho Soomaali, waaxda luqadaha, qaybta kaalmada adeegyada, waxhugo santizo . So [facility] [phone].    ATENCIÓN: Si habla español, tiene a medrano disposición servicios gratuitos de asistencia lingüística. Llame al [phone].    We comply with applicable federal civil rights laws and [place] laws. We do not discriminate on the basis of race, color, national origin, age, disability, sex, sexual orientation, or gender identity.            Thank you!     Thank you for choosing [facility]  for your care. Our goal is always to provide you with excellent care. Hearing back from our patients is one way we can continue to improve our services. Please take a few minutes to complete the written survey that you may receive in the mail after your visit with us. Thank you!             Your Updated Medication List - Protect others around you: Learn how to safely use, store and throw away your medicines at www.disposemymeds.org.          This list is accurate as of 10/2/18  9:56 AM.  Always use your most recent med list.                   Brand Name Dispense Instructions for use Diagnosis    ACETAMINOPHEN " PO      Take 1,000 mg by mouth 3 times daily And give 500 mg PO every 12 hours as needed        calcium acetate 667 MG Caps capsule    PHOSLO     Take 667 mg by mouth 3 times daily (with meals) And 2 times daily with snacks        diltiazem 300 MG 24 hr capsule      Take 300 mg by mouth daily        loperamide 2 MG capsule    IMODIUM    40 capsule    Take 1 capsule (2 mg) by mouth daily Every Monday, Wednesday and Friday prior to dialysis    Diarrhea, unspecified type       metoprolol tartrate 25 MG tablet    LOPRESSOR    90 tablet    Take 0.5 tablets (12.5 mg) by mouth 2 times daily    Essential hypertension with goal blood pressure less than 140/90       nitroGLYcerin 0.4 MG sublingual tablet    NITROSTAT    25 tablet    Place 1 tablet (0.4 mg) under the tongue every 5 minutes as needed for chest pain    Hx of non-ST elevation myocardial infarction (NSTEMI), Atherosclerosis of native coronary artery of native heart without angina pectoris, Postsurgical aortocoronary bypass status       order for DME     1 Device    Equipment being ordered: cpap machine, mask, humidifier, tubing and filters    ANABEL (obstructive sleep apnea)       pentoxifylline 400 MG CR tablet    TRENtal    90 tablet    Take 1 tablet (400 mg) by mouth daily    PAD (peripheral artery disease) (H)       pramipexole 0.125 MG tablet    MIRAPEX    90 tablet    Take 2 tablets (0.25 mg) by mouth At Bedtime    Restless leg syndrome       pravastatin 40 MG tablet    PRAVACHOL    90 tablet    Take 1 tablet (40 mg) by mouth daily    Hx of non-ST elevation myocardial infarction (NSTEMI), Atherosclerosis of native coronary artery of native heart without angina pectoris       * RENAL 1 MG Caps      Take 1 capsule by mouth daily        * NEPHROCAPS 1 MG capsule     90 capsule    Take 1 capsule by mouth daily    Acute renal failure superimposed on stage 4 chronic kidney disease, unspecified acute renal failure type (H)       senna-docusate 8.6-50 MG per tablet     SENOKOT-S;PERICOLACE     Take 2 tablets by mouth 2 times daily as needed for constipation        warfarin 2.5 MG tablet    COUMADIN    40 tablet    Take 5 mg Mon, Fri and 2.5 mg all other days or as directed by the coumadin clinic.    Paroxysmal atrial fibrillation (H), Long term current use of anticoagulant therapy       * Notice:  This list has 2 medication(s) that are the same as other medications prescribed for you. Read the directions carefully, and ask your doctor or other care provider to review them with you.

## 2018-10-02 NOTE — PROGRESS NOTES
Screening Questionnaire for Adult Immunization    Are you sick today?   No   Do you have allergies to medications, food, a vaccine component or latex?   Yes   Have you ever had a serious reaction after receiving a vaccination?   No   Do you have a long-term health problem with heart disease, lung disease, asthma, kidney disease, metabolic disease (e.g. diabetes), anemia, or other blood disorder?   Yes   Do you have cancer, leukemia, HIV/AIDS, or any other immune system problem?   No   In the past 3 months, have you taken medications that affect  your immune system, such as prednisone, other steroids, or anticancer drugs; drugs for the treatment of rheumatoid arthritis, Crohn s disease, or psoriasis; or have you had radiation treatments?   No   Have you had a seizure, or a brain or other nervous system problem?   No   During the past year, have you received a transfusion of blood or blood     products, or been given immune (gamma) globulin or antiviral drug?   No   For women: Are you pregnant or is there a chance you could become        pregnant during the next month?   No   Have you received any vaccinations in the past 4 weeks?   No     Immunization questionnaire was positive for at least one answer.  Notified Yes.        Per orders of Dr. Dheeraj Jj, injection of HD Influenza given by Alison Hernandez. Patient instructed to remain in clinic for 15 minutes afterwards, and to report any adverse reaction to me immediately.       Screening performed by Alison Hernandez on 10/2/2018 at 10:48 AM.      Injectable Influenza Immunization Documentation    1.  Is the person to be vaccinated sick today?   No    2. Does the person to be vaccinated have an allergy to a component   of the vaccine?   No  Egg Allergy Algorithm Link    3. Has the person to be vaccinated ever had a serious reaction   to influenza vaccine in the past?   No    4. Has the person to be vaccinated ever had Guillain-Barré syndrome?   No    Form  completed by Alison Hernandez MA

## 2018-10-04 NOTE — PROGRESS NOTES
ANTICOAGULATION FOLLOW-UP CLINIC VISIT    Patient Name:  Kathryn Banks  Date:  10/4/2018  Contact Type:  Telephone/ [place] - homecare    SUBJECTIVE:     Patient Findings     Positives No Problem Findings    Comments Reason for Call:  INR     Who is calling?  [facility]: [facility]      Phone number:  [phone]     Fax number:  NA      Name of caller: Anisa      INR Value:  2.4     Are there any other concerns:  No     Can we leave a detailed message on this number? YES     Phone number patient can be reached at: Home number on file [phone] (home)        Call taken on 10/4/2018 at 8:42 AM by Zahira Diaz           OBJECTIVE    INR   Date Value Ref Range Status   10/04/2018 2.4  Final       ASSESSMENT / PLAN  INR assessment THER    Recheck INR In: 5 DAYS    INR Location Homecare INR      Anticoagulation Summary as of 10/4/2018     INR goal 2.0-3.0    Today's INR 2.4    Warfarin maintenance plan 5 mg (2.5 mg x 2) on Mon, Fri; 2.5 mg (2.5 mg x 1) all other days    Full warfarin instructions 5 mg on Mon, Fri; 2.5 mg all other days    Weekly warfarin total 22.5 mg    Plan last modified Nicki Calvillo, RN (9/20/2018)    Next INR check 10/9/2018    Target end date       Anticoagulation Episode Summary     INR check location     Preferred lab     Send INR reminders to [facility] RANDI    Comments 2.5 mg tabs      Anticoagulation Care Providers     Provider Role Specialty Phone number    Dheeraj Jj MD [facility] Internal Medicine [phone]            See the Encounter Report to view Anticoagulation Flowsheet and Dosing Calendar (Go to Encounters tab in chart review, and find the Anticoagulation Therapy Visit)    Dosage adjustment made based on physician directed care plan.    Gianni Palumbo, RN

## 2018-10-04 NOTE — MR AVS SNAPSHOT
Kathryn MICKY Banks   10/4/2018   Anticoagulation Therapy Visit    Description:  78 year old female   Provider:  Dheeraj Jj MD   Department:  Ph Anticoag           INR as of 10/4/2018     Today's INR 2.4      Anticoagulation Summary as of 10/4/2018     INR goal 2.0-3.0    Today's INR 2.4    Full warfarin instructions 5 mg on Mon, Fri; 2.5 mg all other days    Next INR check 10/9/2018      Contact Numbers     Clinic Number:         October 2018 Details    Sun Mon Tue Wed Thu Fri Sat      1               2               3               4      2.5 mg   See details      5      5 mg         6      2.5 mg           7      2.5 mg         8      5 mg         9            10               11               12               13                 14               15               16               17               18               19               20                 21               22               23               24               25               26               27                 28               29               30               31                   Date Details   10/04 This INR check       Date of next INR:  10/9/2018         How to take your warfarin dose     To take:  2.5 mg Take 1 of the 2.5 mg tablets.    To take:  5 mg Take 2 of the 2.5 mg tablets.

## 2018-10-04 NOTE — TELEPHONE ENCOUNTER
Reason for Call:  INR    Who is calling?  [facility]: [facility]     Phone number:  [phone]    Fax number:  NA     Name of caller: Anisa     INR Value:  2.4    Are there any other concerns:  No    Can we leave a detailed message on this number? YES    Phone number patient can be reached at: Home number on file [phone] (home)      Call taken on 10/4/2018 at 8:42 AM by Zahira Diaz

## 2018-10-04 NOTE — TELEPHONE ENCOUNTER
I have attempted to contact this HC nurse by phone, left message to return call at [phone] or [phone].  Will try again later.  Pt was advised in the ED to take 5 mg 10/1, but we had advised 7.5 mg that day- need to verify what she took.     Nicki Calvillo, RN- Coumadin Clinic RN

## 2018-10-05 NOTE — TELEPHONE ENCOUNTER
"Diltiazem  Noted as HISTORICAL-ordered by Yanelis started 8/30/2018  Last office visit: 10/2/2018   Future Office Visit:   Next 5 appointments (look out 90 days)     Nov 15, 2018  1:30 PM CST   Return Visit with Thaddeus King MD   [facility] ([facility]    [address] [place] [id number] [phone]                   Requested Prescriptions   Pending Prescriptions Disp Refills     diltiazem 300 MG 24 hr capsule       Sig: Take 1 capsule (300 mg) by mouth daily    Calcium Channel Blockers Protocol  Failed    10/4/2018 10:29 AM       Failed - Normal serum creatinine on file in past 12 months    Recent Labs   Lab Test  10/01/18   1535   CR  1.75*            Passed - Blood pressure under 140/90 in past 12 months    BP Readings from Last 3 Encounters:   10/02/18 138/66   10/01/18 117/46   07/20/18 124/62                Passed - Normal ALT in past 12 months    Recent Labs   Lab Test  10/01/18   1535   ALT  20            Passed - Recent (12 mo) or future (30 days) visit within the authorizing provider's specialty    Patient had office visit in the last 12 months or has a visit in the next 30 days with authorizing provider or within the authorizing provider's specialty.  See \"Patient Info\" tab in inbasket, or \"Choose Columns\" in Meds & Orders section of the refill encounter.           Passed - Patient is age 18 or older       Passed - No active pregnancy on record       Passed - No positive pregnancy test in past 12 months          Routing refill request to provider for review/approval because:  Medication is reported/historical  Christina Hernandez RN on 10/5/2018 at 10:03 AM    "

## 2018-10-09 NOTE — MR AVS SNAPSHOT
Kathryn MICKY Banks   10/9/2018   Anticoagulation Therapy Visit    Description:  78 year old female   Provider:  Dheeraj Jj MD   Department:  Ph Anticoag           INR as of 10/9/2018     Today's INR 1.9!      Anticoagulation Summary as of 10/9/2018     INR goal 2.0-3.0    Today's INR 1.9!    Full warfarin instructions 5 mg on Mon, Fri; 2.5 mg all other days    Next INR check 10/16/2018      Contact Numbers     Clinic Number:         October 2018 Details    Sun Mon Tue Wed Thu Fri Sat      1               2               3               4               5               6                 7               8               9      2.5 mg   See details      10      2.5 mg         11      2.5 mg         12      5 mg         13      2.5 mg           14      2.5 mg         15      5 mg         16            17               18               19               20                 21               22               23               24               25               26               27                 28               29               30               31                   Date Details   10/09 This INR check       Date of next INR:  10/16/2018         How to take your warfarin dose     To take:  2.5 mg Take 1 of the 2.5 mg tablets.    To take:  5 mg Take 2 of the 2.5 mg tablets.

## 2018-10-09 NOTE — TELEPHONE ENCOUNTER
Reason for Call:  INR    Who is calling?  Home Care: [facility] WAITING TO SET UP MEDS    Phone number:  [phone]    Fax number:      Name of caller: RASHAD    INR Value:  1.9    Are there any other concerns:  NO    Can we leave a detailed message on this number? YES    Phone number patient can be reached at: Home number on file [phone] (home)      Call taken on 10/9/2018 at 10:21 AM by Sienna Lafleur

## 2018-10-09 NOTE — PROGRESS NOTES
ANTICOAGULATION FOLLOW-UP CLINIC VISIT    Patient Name:  Kathryn Banks  Date:  10/9/2018  Contact Type:  Telephone/ [place]    SUBJECTIVE:     Patient Findings     Positives No Problem Findings    Comments Reason for Call:  INR     Who is calling?  Home Care: [facility] THE HOME WAITING TO SET UP MEDS     Phone number:  [phone]     Fax number:       Name of caller: RASHAD     INR Value:  1.9     Are there any other concerns:  NO     Can we leave a detailed message on this number? YES     Phone number patient can be reached at: Home number on file [phone] (home)        Call taken on 10/9/2018 at 10:21 AM by Sienna Lafleur           OBJECTIVE    INR   Date Value Ref Range Status   10/09/2018 1.9  Final       ASSESSMENT / PLAN  INR assessment THER    Recheck INR In: 1 WEEK    INR Location Homecare INR      Anticoagulation Summary as of 10/9/2018     INR goal 2.0-3.0    Today's INR 1.9!    Warfarin maintenance plan 5 mg (2.5 mg x 2) on Mon, Fri; 2.5 mg (2.5 mg x 1) all other days    Full warfarin instructions 5 mg on Mon, Fri; 2.5 mg all other days    Weekly warfarin total 22.5 mg    Plan last modified Nicki Calvillo, RN (9/20/2018)    Next INR check 10/16/2018    Target end date       Anticoagulation Episode Summary     INR check location     Preferred lab     Send INR reminders to [facility]YAN    Comments 2.5 mg tabs      Anticoagulation Care Providers     Provider Role Specialty Phone number    Dheeraj Jj MD [facility] Internal Medicine [phone]            See the Encounter Report to view Anticoagulation Flowsheet and Dosing Calendar (Go to Encounters tab in chart review, and find the Anticoagulation Therapy Visit)    Dosage adjustment made based on physician directed care plan.      Zo Davis, RN

## 2018-10-16 NOTE — MR AVS SNAPSHOT
Kathryn MICKY Banks   10/16/2018   Anticoagulation Therapy Visit    Description:  78 year old female   Provider:  Dheeraj Jj MD   Department:  Ph Anticoag           INR as of 10/16/2018     Today's INR 2.3      Anticoagulation Summary as of 10/16/2018     INR goal 2.0-3.0    Today's INR 2.3    Full warfarin instructions 5 mg on Mon, Fri; 2.5 mg all other days    Next INR check 10/23/2018      Contact Numbers     Clinic Number:         October 2018 Details    Sun Mon Tue Wed Thu Fri Sat      1               2               3               4               5               6                 7               8               9               10               11               12               13                 14               15               16      2.5 mg   See details      17      2.5 mg         18      2.5 mg         19      5 mg         20      2.5 mg           21      2.5 mg         22      5 mg         23            24               25               26               27                 28               29               30               31                   Date Details   10/16 This INR check       Date of next INR:  10/23/2018         How to take your warfarin dose     To take:  2.5 mg Take 1 of the 2.5 mg tablets.    To take:  5 mg Take 2 of the 2.5 mg tablets.

## 2018-10-16 NOTE — TELEPHONE ENCOUNTER
Reason for Call:  INR    Who is calling?  [facility]: [facility]    Phone number: [phone]    Fax number:      Name of caller: Bharati    INR Value:  2.3, missed dosage yesterday afternoon but took it this am.    Are there any other concerns:  No    Can we leave a detailed message on this number? Not Applicable    Phone number patient can be reached at:       Call taken on 10/16/2018 at 9:53 AM by Erica Boo

## 2018-10-16 NOTE — PROGRESS NOTES
ANTICOAGULATION FOLLOW-UP CLINIC VISIT    Patient Name:  Kathryn Banks  Date:  10/16/2018  Contact Type:  Telephone    SUBJECTIVE:     Patient Findings     Positives No Problem Findings    Comments Reason for Call:  INR     Who is calling?  [facility]: [facility]     Phone number: [phone]     Fax number:       Name of caller: Bharati     INR Value:  2.3, missed dosage yesterday afternoon but took it this am.     Are there any other concerns:  No     Can we leave a detailed message on this number? Not Applicable     Phone number patient can be reached at:         Call taken on 10/16/2018 at 9:53 AM by Erica Boo           OBJECTIVE    INR   Date Value Ref Range Status   10/16/2018 2.3  Final       ASSESSMENT / PLAN  INR assessment THER    Recheck INR In: 1 WEEK    INR Location [facility] INR      Anticoagulation Summary as of 10/16/2018     INR goal 2.0-3.0    Today's INR 2.3    Warfarin maintenance plan 5 mg (2.5 mg x 2) on Mon, Fri; 2.5 mg (2.5 mg x 1) all other days    Full warfarin instructions 5 mg on Mon, Fri; 2.5 mg all other days    Weekly warfarin total 22.5 mg    No change documented Nicki Calvillo, RN    Plan last modified Nicki Calvillo, RN (9/20/2018)    Next INR check 10/23/2018    Target end date       Anticoagulation Episode Summary     INR check location     Preferred lab     Send INR reminders to [facility] [place]    Comments 2.5 mg tabs      Anticoagulation Care Providers     Provider Role Specialty Phone number    Dheeraj Jj MD [facility] Internal Medicine [phone]            See the Encounter Report to view Anticoagulation Flowsheet and Dosing Calendar (Go to Encounters tab in chart review, and find the Anticoagulation Therapy Visit)    Dosage adjustment made based on physician directed care plan.    Nicki Calvillo, RN

## 2018-10-22 NOTE — PROGRESS NOTES
[facility] now requests orders and shares plan of care/discharge summaries for some patients through [employer].  Please REPLY TO THIS MESSAGE OR ROUTE BACK TO THE AUTHOR in order to give authorization for orders when needed.  This is considered a verbal order, you will still receive a faxed copy of orders for signature.  Thank you for your assistance in improving collaboration for our patients.    ORDER    1. Skilled Nursing Visits 3w1, 1w1  2. HHA 3w1, 1w1  Pt declines PT/OT  3. Wound Care to left shin contusion, clean with wound cleanser, keep clean and dry, apply dry gauze dressing and change daily until healed. Pt caregiver to perform on non nurse days.   4. Check INRs, due 10/23/18    Meds not available for med set up and need prior auth, [facility] to contact you for Calcitrol and Vitamin D3.    Thank you  Dagmar Pérez RN

## 2018-10-22 NOTE — PROVIDER NOTIFICATION
Hospitalist called regarding critical labs values (post dialysis checked by dialysis team). New order to recheck lab am.    Complex Repair And Skin Substitute Graft Text: The defect edges were debeveled with a #15 scalpel blade.  The primary defect was closed partially with a complex linear closure.  Given the location of the remaining defect, shape of the defect and the proximity to free margins a skin substitute graft was deemed most appropriate to repair the remaining defect.  The graft was trimmed to fit the size of the remaining defect.  The graft was then placed in the primary defect, oriented appropriately, and sutured into place.

## 2018-10-23 NOTE — TELEPHONE ENCOUNTER
Reason for Call:  INR    Who is calling?  [facility]: [facility]    Phone number:  [phone]    Fax number:  NA    Name of caller: Dasha    INR Value:  3.1    Are there any other concerns:  No    Can we leave a detailed message on this number? YES    Phone number patient can be reached at:       Call taken on 10/23/2018 at 11:29 AM by Zo Cardenas

## 2018-10-23 NOTE — MR AVS SNAPSHOT
Kathryn MICKY Banks   10/23/2018   Anticoagulation Therapy Visit    Description:  78 year old female   Provider:  Dheeraj Jj MD   Department:  Ph Anticoag           INR as of 10/23/2018     Today's INR 3.1!      Anticoagulation Summary as of 10/23/2018     INR goal 2.0-3.0    Today's INR 3.1!    Full warfarin instructions 5 mg on Mon, Fri; 2.5 mg all other days    Next INR check 10/30/2018      Contact Numbers     Clinic Number:         October 2018 Details    Sun Mon Tue Wed Thu Fri Sat      1               2               3               4               5               6                 7               8               9               10               11               12               13                 14               15               16               17               18               19               20                 21               22               23      2.5 mg   See details      24      2.5 mg         25      2.5 mg         26      5 mg         27      2.5 mg           28      2.5 mg         29      5 mg         30            31                   Date Details   10/23 This INR check       Date of next INR:  10/30/2018         How to take your warfarin dose     To take:  2.5 mg Take 1 of the 2.5 mg tablets.    To take:  5 mg Take 2 of the 2.5 mg tablets.

## 2018-10-23 NOTE — PROGRESS NOTES
ANTICOAGULATION FOLLOW-UP CLINIC VISIT    Patient Name:  Kathryn Banks  Date:  10/23/2018  Contact Type:  Telephone/ Dasha,  nurse    SUBJECTIVE:     Patient Findings     Positives No Problem Findings    Comments Reason for Call:  INR     Who is calling?  [facility]: [facility]     Phone number:  [phone]     Fax number:  NA     Name of caller: Dasha     INR Value:  3.1     Are there any other concerns:  No     Can we leave a detailed message on this number? YES     Phone number patient can be reached at:         Call taken on 10/23/2018 at 11:29 AM by Zo Cardenas           OBJECTIVE    INR   Date Value Ref Range Status   10/23/2018 3.1  Final       ASSESSMENT / PLAN  INR assessment THER    Recheck INR In: 1 WEEK    INR Location [facility] INR      Anticoagulation Summary as of 10/23/2018     INR goal 2.0-3.0    Today's INR 3.1!    Warfarin maintenance plan 5 mg (2.5 mg x 2) on Mon, Fri; 2.5 mg (2.5 mg x 1) all other days    Full warfarin instructions 5 mg on Mon, Fri; 2.5 mg all other days    Weekly warfarin total 22.5 mg    No change documented Zo Davis RN    Plan last modified Nicki Calvillo RN (9/20/2018)    Next INR check 10/30/2018    Target end date       Anticoagulation Episode Summary     INR check location     Preferred lab     Send INR reminders to [facility]    Comments 2.5 mg tabs      Anticoagulation Care Providers     Provider Role Specialty Phone number    Dheeraj Jj MD [facility] Internal Medicine [phone]            See the Encounter Report to view Anticoagulation Flowsheet and Dosing Calendar (Go to Encounters tab in chart review, and find the Anticoagulation Therapy Visit)    Dosage adjustment made based on physician directed care plan.      Zo Davis, ROSARIO

## 2018-10-30 NOTE — MR AVS SNAPSHOT
Kathryn WEEKS Jocelyn   10/30/2018   Anticoagulation Therapy Visit    Description:  78 year old female   Provider:  Dheeraj Jj MD   Department:  Ph Anticoag           INR as of 10/30/2018     Today's INR 1.6!      Anticoagulation Summary as of 10/30/2018     INR goal 2.0-3.0    Today's INR 1.6!    Full warfarin instructions 10/30: 5 mg; 10/31: 5 mg; Otherwise 5 mg on Mon, Fri; 2.5 mg all other days    Next INR check 11/6/2018      Contact Numbers     Clinic Number:         October 2018 Details    Sun Mon Tue Wed Thu Fri Sat      1               2               3               4               5               6                 7               8               9               10               11               12               13                 14               15               16               17               18               19               20                 21               22               23               24               25               26               27                 28               29               30      5 mg   See details      31      5 mg             Date Details   10/30 This INR check               How to take your warfarin dose     To take:  5 mg Take 2 of the 2.5 mg tablets.           November 2018 Details    Sun Mon Tue Wed Thu Fri Sat         1      2.5 mg         2      5 mg         3      2.5 mg           4      2.5 mg         5      5 mg         6            7               8               9               10                 11               12               13               14               15               16               17                 18               19               20               21               22               23               24                 25               26               27               28               29               30                 Date Details   No additional details    Date of next INR:  11/6/2018         How to take your warfarin dose     To take:  2.5  mg Take 1 of the 2.5 mg tablets.    To take:  5 mg Take 2 of the 2.5 mg tablets.

## 2018-10-30 NOTE — PROGRESS NOTES
ANTICOAGULATION FOLLOW-UP CLINIC VISIT    Patient Name:  Kathryn Banks  Date:  10/30/2018  Contact Type:  Telephone    SUBJECTIVE:     Patient Findings     Positives Missed doses    Comments Reason for Call:  INR     Who is calling?  Home Care:      Phone number:  [phone]     Fax number:       Name of caller: Bharati     INR Value:  1.6, missed 3 doses this week     Are there any other concerns:  No     Can we leave a detailed message on this number? YES     Phone number patient can be reached at: Other phone number:  [phone]*        Call taken on 10/30/2018 at 8:28 AM by Josephine Tejeda           OBJECTIVE    INR   Date Value Ref Range Status   10/30/2018 1.6  Final       ASSESSMENT / PLAN  INR assessment SUB    Recheck INR In: 1 WEEK    INR Location [facility] INR      Anticoagulation Summary as of 10/30/2018     INR goal 2.0-3.0    Today's INR 1.6!    Warfarin maintenance plan 5 mg (2.5 mg x 2) on Mon, Fri; 2.5 mg (2.5 mg x 1) all other days    Full warfarin instructions 10/30: 5 mg; 10/31: 5 mg; Otherwise 5 mg on Mon, Fri; 2.5 mg all other days    Weekly warfarin total 22.5 mg    Plan last modified Nicki Calvillo RN (9/20/2018)    Next INR check 11/6/2018    Target end date       Anticoagulation Episode Summary     INR check location     Preferred lab     Send [facility] to [facility]    Comments 2.5 mg tabs      Anticoagulation Care Providers     Provider Role Specialty Phone number    Dheeraj Jj MD [facility] Internal Medicine [phone]            See the Encounter Report to view Anticoagulation Flowsheet and Dosing Calendar (Go to Encounters tab in chart review, and find the Anticoagulation Therapy Visit)    Dosage adjustment made based on physician directed care plan.    Nicki Calvillo, RN

## 2018-10-30 NOTE — TELEPHONE ENCOUNTER
Reason for Call:  INR    Who is calling?  Home Care:     Phone number:  [phone]    Fax number:      Name of caller: Bharati    INR Value:  1.6, missed 3 doses this week    Are there any other concerns:  No    Can we leave a detailed message on this number? YES    Phone number patient can be reached at: Other phone number:  [phone]*      Call taken on 10/30/2018 at 8:28 AM by Josephine Tejeda

## 2018-10-31 NOTE — PROGRESS NOTES
[facility] now requests orders and shares plan of care/discharge summaries for some patients through [employer].  Please REPLY TO THIS MESSAGE OR ROUTE BACK TO THE AUTHOR in order to give authorization for orders when needed.  This is considered a verbal order, you will still receive a faxed copy of orders for signature.  Thank you for your assistance in improving collaboration for our patients.    ORDER   HA 2 wk 1, 3 wk 4   SN 1 wk 1, 2 wk 2, 2 wk 1, 3 PRN   Wound care to LLE adaptic, kerlix, Ace wrap to MARY TAVAREZ SUMMARY/PLAN OF CARE  RECERTIFICATION OF CARE 10/30/18   S Pt is a 78 year old female being recertified for home care services. In the past 60 days patient has been hospitalized twice with most recent hospitalization at [facility] from 10/17 to 10/20/18. The last 2 admissions were admissions from dialysis after developing afib w rapid ventricular response w vol overload and hypoxia. This past hospitalization patient had a chest xray which demonstrated bilateral pleural effusions and underwent thoracentesis. She has a hx of noncompliance with medications thus leading to repeated hospitalizations. Patient was recently started on amiodarone at last hospitalizaion. Pt is a/o x 3, very pleasant, and cooperative. VS, T 97.6, P 96, R 18, /70, LS slightly diminished, O2 91 percent RA.  fasting. Weight 138 lbs. Denies pain at this visit. Left shin ulcer, pt states she bumped into her shin, causing contusion. Educated on infection prevention and wound care. Education on safety measures. Educated on daily weights and to notify MD/RN of 2 lb weight gain overnight and 5 lb in a week.  Patient needing assistance with ADLs/IADLs. Has HHA to assist with showering, has friends checking in on patient often at least 3-4 times a week then has another friend that assist with taking patient to dialysis monday, wednesday and friday.  B Medical hx includes PAROXYSMAL ATRIAL FIBRILLATION WITH RVR, CHF,  PLEURAL EFFUSION, ESRD ON DIALYSIS, CAD, OSTEOARTHROSIS, RLS, DM2, HTN, HX PNEUMONIA, LE WOUND TRAUMA, INCONTINENCE, EDEMA, DYSPNEA, HLDL, NON COMPLIANCE WITH MEDICATIONS.  A Pt is at risk for hospitalizations r/t complex medical hx, weakness, age, non compliance, and will require skilled intervention to manage her health at home.   R SNV for CP, safety, INRs, VS, medication, chronic disease management, education. Pt requiring homecare r/t recurrent hospitalizations for fluid overload, afib with rvr, med non compliance. HHA for bathing and personal cares. Pt declines PT/OT.   For RCT only, Estimated Length of Home Care Need 5 weeks to get met goals and less hospitalizations

## 2018-11-06 NOTE — PROGRESS NOTES
ANTICOAGULATION FOLLOW-UP CLINIC VISIT    Patient Name:  Kathryn Banks  Date:  11/6/2018  Contact Type:  Telephone    SUBJECTIVE:     Patient Findings     Positives Missed doses (sat sun)    Comments Reason for Call:  INR     Who is calling?  [facility]: [facility]     Phone number:       Fax number:       Name of caller:      INR Value:  1.7     Are there any other concerns:  Yes: did miss Saturday and Sunday doses     Can we leave a detailed message on this number? YES     Phone number patient can be reached at: Other phone number:          Call taken on 11/6/2018 at 8:09 AM by Megan Miller           OBJECTIVE    INR   Date Value Ref Range Status   11/06/2018 1.7  Final       ASSESSMENT / PLAN  INR assessment SUB    Recheck INR In: 1 WEEK    INR Location [facility] INR      Anticoagulation Summary as of 11/6/2018     INR goal 2.0-3.0    Today's INR 1.7!    Warfarin maintenance plan 5 mg (2.5 mg x 2) on Mon, Fri; 2.5 mg (2.5 mg x 1) all other days    Full warfarin instructions 11/6: 5 mg; 11/7: 5 mg; Otherwise 5 mg on Mon, Fri; 2.5 mg all other days    Weekly warfarin total 22.5 mg    Plan last modified Nicki Calvillo, RN (9/20/2018)    Next INR check 11/13/2018    Target end date       Anticoagulation Episode Summary     INR check location     Preferred lab     Send INR reminders to [facility]    Comments 2.5 mg tabs      Anticoagulation Care Providers     Provider Role Specialty Phone number    Dheeraj Jj MD [facility] Internal Medicine [phone]            See the Encounter Report to view Anticoagulation Flowsheet and Dosing Calendar (Go to Encounters tab in chart review, and find the Anticoagulation Therapy Visit)    Dosage adjustment made based on physician directed care plan.    Nicki Calvillo, RN

## 2018-11-06 NOTE — TELEPHONE ENCOUNTER
Reason for Call:  INR    Who is calling?  Home [facility]: [facility]    Phone number:      Fax number:      Name of caller:     INR Value:  1.7    Are there any other concerns:  Yes: did miss Saturday and Sunday doses    Can we leave a detailed message on this number? YES    Phone number patient can be reached at: Other phone number:        Call taken on 11/6/2018 at 8:09 AM by Megan Miller

## 2018-11-06 NOTE — MR AVS SNAPSHOT
Kathryn MICKY Banks   11/6/2018   Anticoagulation Therapy Visit    Description:  78 year old female   Provider:  Dheeraj Jj MD   Department:  Ph Anticoag           INR as of 11/6/2018     Today's INR 1.7!      Anticoagulation Summary as of 11/6/2018     INR goal 2.0-3.0    Today's INR 1.7!    Full warfarin instructions 11/6: 5 mg; 11/7: 5 mg; Otherwise 5 mg on Mon, Fri; 2.5 mg all other days    Next INR check 11/13/2018      Contact Numbers     Clinic Number:         November 2018 Details    Sun Mon Tue Wed Thu Fri Sat         1               2               3                 4               5               6      5 mg   See details      7      5 mg         8      2.5 mg         9      5 mg         10      2.5 mg           11      2.5 mg         12      5 mg         13            14               15               16               17                 18               19               20               21               22               23               24                 25               26               27               28               29               30                 Date Details   11/06 This INR check       Date of next INR:  11/13/2018         How to take your warfarin dose     To take:  2.5 mg Take 1 of the 2.5 mg tablets.    To take:  5 mg Take 2 of the 2.5 mg tablets.

## 2018-11-13 NOTE — PROGRESS NOTES
ANTICOAGULATION FOLLOW-UP CLINIC VISIT    Patient Name:  Kathryn Banks  Date:  11/13/2018  Contact Type:  Telephone/ Bharati [facility]    SUBJECTIVE:     Patient Findings     Comments POC = 5.9- venipuncture = 5.40  5.9 is likely due to faulty Roche strips.   Zo Davis RN            Reason for Call:  INR     Who is calling?  Home [facility]: [facility]      Phone number:  [phone]     Fax number:  NA      Name of caller: Bharati     INR Value:  5.9     Are there any other concerns:  No     Can we leave a detailed message on this number? YES     Phone number patient can be reached at:         Call taken on 11/13/2018 at 11:35 AM by Zo Cardenas           OBJECTIVE    INR   Date Value Ref Range Status   11/13/2018 4.50 (H) 0.86 - 1.14 Final       ASSESSMENT / PLAN  INR assessment SUPRA    Recheck INR In: 3 DAYS    INR Location [facility] INR      Anticoagulation Summary as of 11/13/2018     INR goal 2.0-3.0    Today's INR 4.50!    Warfarin maintenance plan 5 mg (2.5 mg x 2) on Mon, Fri; 2.5 mg (2.5 mg x 1) all other days    Full warfarin instructions 11/13: Hold; 11/15: 5 mg; Otherwise 5 mg on Mon, Fri; 2.5 mg all other days    Weekly warfarin total 22.5 mg    Plan last modified Nicki Calvillo, RN (9/20/2018)    Next INR check 11/16/2018    Target end date       Anticoagulation Episode Summary     INR check location     Preferred lab     Send INR reminders to [facility]    Comments 2.5 mg tabs      Anticoagulation Care Providers     Provider Role Specialty Phone number    Dheeraj Jj MD [facility] Internal Medicine [phone]            See the Encounter Report to view Anticoagulation Flowsheet and Dosing Calendar (Go to Encounters tab in chart review, and find the Anticoagulation Therapy Visit)    Dosage adjustment made based on physician directed care plan.    Zo Davis, ROSARIO

## 2018-11-13 NOTE — MR AVS SNAPSHOT
Kathryn WEEKS Jocelyn   11/13/2018   Anticoagulation Therapy Visit    Description:  78 year old female   Provider:  Dheeraj Jj MD   Department:  Ph Anticoag           INR as of 11/13/2018     Today's INR 4.50!      Anticoagulation Summary as of 11/13/2018     INR goal 2.0-3.0    Today's INR 4.50!    Full warfarin instructions 11/13: Hold; 11/15: 5 mg; Otherwise 5 mg on Mon, Fri; 2.5 mg all other days    Next INR check 11/16/2018      Contact Numbers     Clinic Number:         November 2018 Details    Sun Mon Tue Wed Thu Fri Sat         1               2               3                 4               5               6               7               8               9               10                 11               12               13      Hold   See details      14      2.5 mg         15      5 mg         16            17                 18               19               20               21               22               23               24                 25               26               27               28               29               30                 Date Details   11/13 This INR check       Date of next INR:  11/16/2018         How to take your warfarin dose     To take:  2.5 mg Take 1 of the 2.5 mg tablets.    To take:  5 mg Take 2 of the 2.5 mg tablets.    Hold Do not take your warfarin dose. See the Details table to the right for additional instructions.

## 2018-11-13 NOTE — TELEPHONE ENCOUNTER
Reason for Call:  INR    Who is calling?  Home Care: [facility]    Phone number:  [phone]    Fax number:  NA     Name of caller: Bharati    INR Value:  5.9    Are there any other concerns:  No    Can we leave a detailed message on this number? YES    Phone number patient can be reached at:       Call taken on 11/13/2018 at 11:35 AM by Zo Cardenas

## 2018-11-13 NOTE — TELEPHONE ENCOUNTER
A venipuncture INR was drawn and brought in, it should have made the AM . She will have pt hold her dose until she hears from us, either this afternoon or tomorrow am.   She did note that pt likely got all her doses last week, where she had been missing doses the weeks prior, so may be why her INR is up.     Nicki Calvillo, RN- [facility] RN

## 2018-11-15 NOTE — PROGRESS NOTES
HPI and Plan:   See dictation    Orders Placed This Encounter   Procedures     Follow-Up with Cardiac Advanced Practice Provider     Irene Patch Monitor       Orders Placed This Encounter   Medications     metoprolol tartrate (LOPRESSOR) 25 MG tablet     Sig: Take 2 tablets (50 mg) by mouth 2 times daily     Dispense:  180 tablet     Refill:  1       Medications Discontinued During This Encounter   Medication Reason     diltiazem 300 MG 24 hr capsule Not filled/taken by Patient     metoprolol tartrate (LOPRESSOR) 25 MG tablet Reorder         Encounter Diagnoses   Name Primary?     Essential hypertension with goal blood pressure less than 140/90      Paroxysmal atrial fibrillation (H) Yes       CURRENT MEDICATIONS:  Current Outpatient Prescriptions   Medication Sig Dispense Refill     ACETAMINOPHEN PO Take 1,000 mg by mouth 3 times daily And give 500 mg PO every 12 hours as needed       B Complex-C-Folic Acid (RENAL) 1 MG CAPS Take 1 capsule by mouth daily       calcium acetate (PHOSLO) 667 MG CAPS capsule Take 667 mg by mouth 3 times daily (with meals) And 2 times daily with snacks       loperamide (IMODIUM) 2 MG capsule Take 1 capsule (2 mg) by mouth daily Every Monday, Wednesday and Friday prior to dialysis 40 capsule 1     metoprolol tartrate (LOPRESSOR) 25 MG tablet Take 2 tablets (50 mg) by mouth 2 times daily 180 tablet 1     NEPHROCAPS (TRIPHROCAPS) 1 MG capsule Take 1 capsule by mouth daily 90 capsule 3     nitroGLYcerin (NITROSTAT) 0.4 MG sublingual tablet Place 1 tablet (0.4 mg) under the tongue every 5 minutes as needed for chest pain 25 tablet 0     ORDER FOR DME Equipment being ordered: cpap machine, mask, humidifier, tubing and filters 1 Device 0     pentoxifylline (TRENTAL) 400 MG CR tablet Take 1 tablet (400 mg) by mouth daily 90 tablet 0     pravastatin (PRAVACHOL) 40 MG tablet Take 1 tablet (40 mg) by mouth daily 90 tablet 3     senna-docusate (SENOKOT-S;PERICOLACE) 8.6-50 MG per tablet Take 2  tablets by mouth 2 times daily as needed for constipation       warfarin (COUMADIN) 2.5 MG tablet Take 5 mg Mon, Fri and 2.5 mg all other days or as directed by the coumadin clinic. 40 tablet 1     pramipexole (MIRAPEX) 0.125 MG tablet Take 2 tablets (0.25 mg) by mouth At Bedtime 90 tablet 3     [DISCONTINUED] metoprolol tartrate (LOPRESSOR) 25 MG tablet Take 1 tablet (25 mg) by mouth 2 times daily 180 tablet 1       ALLERGIES     Allergies   Allergen Reactions     Ciprofloxacin Nausea and Vomiting     Zofran did not help     Oxycodone Visual Disturbance     Delusions, blackouts      Lisinopril Cough     Sulfa Drugs GI Disturbance     LOSS OF TASTE     Tape [Adhesive Tape]      Penicillins Other (See Comments)     Swelling and reddness at injection site, pt wanted it on        PAST MEDICAL HISTORY:  Past Medical History:   Diagnosis Date     Carpal tunnel syndrome      Coronary atherosclerosis of native coronary artery 2006    5 vessel CABG     OBSTRUCTIVE SLEEP APNEA     using CPAP     Osteoarthrosis, unspecified whether generalized or localized, unspecified site     generalized arthritis, particularly in her hands and feet         Other and combined forms of senile cataract 2000    Bilateral     Other and unspecified hyperlipidemia      Paroxysmal atrial fibrillation (H)      Renal failure     on hemodialysis     RESTLESS LEGS SYNDROME      TENOSYNOV HAND/WRIST NEC 6/30/2006     Type II or unspecified type diabetes mellitus without mention of complication, not stated as uncontrolled 2001    Diabetes mellitus/pt is diet controlled with weight loss     Typical atrial flutter (H) 01/17/2007    ablation 6/7/2017     Unspecified essential hypertension        PAST SURGICAL HISTORY:  Past Surgical History:   Procedure Laterality Date     ANGIOPLASTY       C APPENDECTOMY       C CABG, VEIN, FIVE  12/06    SVG x 4 and LIMA to LAD     C SOTO W/O FACETEC FORAMOT/DSKC 1/2 VRT SEG, LUMBAR  1968     C REMV CATARACT  INTRACAP,INSERT LENS      Bilateral     C TOTAL ABDOM HYSTERECTOMY       - fibroids     C VAGOTOMY/PYLOROPLASTY,SELECT  1970     COLONOSCOPY  12/3/2007     COLONOSCOPY  2014    Procedure: COLONOSCOPY;  Colonoscopy;  Surgeon: Zack Stearns MD;  Location: [facility]     CREATE GRAFT LOOP ARTERIOVENOUS UPPER EXTREMITY Left 2018    Procedure: CREATE GRAFT ARTERIOVENOUS UPPER EXTREMITY;  Left Upper Arm Arteriovenous Graft Creation, Anesthesia Block ;  Surgeon: Cassie Cardenas MD;  Location: [facility]     CYSTOSCOPY  09    [facility]     ENDOSCOPY  2000    [facility]     [facility] COLONOSCOPY THRU STOMA, DIAGNOSTIC  10/7/04    poor prep, repeat in 2-3 years     HC COLONOSCOPY W/WO BRUSH/WASH  10/31/07    Repeat-poor quality prep     HC REMOVAL OF OVARY/TUBE(S)      Salpingo-Oophorectomy, Unilateral     HC REVISE MEDIAN N/CARPAL TUNNEL SURG      Carpal tunnel release     HC UGI ENDOSCOPY DIAG W BIOPSY  10/31/07     HC UGI ENDOSCOPY, SIMPLE EXAM  12/3/2007     LAPAROTOMY, LYSIS ADHESIONS, COMBINED N/A 10/24/2017    Procedure: COMBINED LAPAROTOMY, LYSIS ADHESIONS;  REPAIR FOCAL ILEAL SMALL BOWEL PERFERATION, LYSIS OF ADHESIONS.;  Surgeon: Rashard Zafar MD;  Location:  OR     OPEN REDUCTION INTERNAL FIXATION HIP NAILING Left 7/3/2016    Procedure: OPEN REDUCTION INTERNAL FIXATION HIP NAILING;  Surgeon: Lake Schulz MD;  Location:  OR       FAMILY HISTORY:  Family History   Problem Relation Age of Onset     Diabetes Father      AODM     Neurologic Disorder Father      Parkinson's     Blood Disease Father       from blood clot from leg to lung immediately after hip fracture     Diabetes Paternal Grandmother      adult onset     Diabetes Maternal Grandmother      adult onset     Hypertension No family hx of      Cerebrovascular Disease No family hx of        SOCIAL HISTORY:  Social History     Social History     Marital status:      Spouse name: Urbano Banks     Number of children: 2      "Years of education: 13     Occupational History     Ministry coordinator      [employer]     Social History Main Topics     Smoking status: Former Smoker     Years: 10.00     Quit date: 1/1/1969     Smokeless tobacco: Never Used     Alcohol use 0.0 oz/week     0 Standard drinks or equivalent per week      Comment: 1/2 a beer once a month     Drug use: No     Sexual activity: Yes     Birth control/ protection: Surgical     Other Topics Concern     Parent/Sibling W/ Cabg, Mi Or Angioplasty Before 65f 55m? No     Social History Narrative       Review of Systems:  Skin:  Negative       Eyes:  Positive for glasses    ENT:  Negative      Respiratory:  Positive for shortness of breath;dyspnea on exertion     Cardiovascular:  Negative for;palpitations;chest pain;lightheadedness;dizziness Positive for;edema;fatigue;exercise intolerance    Gastroenterology: Negative      Genitourinary:  Negative      Musculoskeletal:  Negative      Neurologic:  Positive for numbness or tingling of feet neuropathy in feet   Psychiatric:  Positive for sleep disturbances not much sleep   Heme/Lymph/Imm:  Positive for      Endocrine:  Positive for diabetes      Physical Exam:  Vitals: /62 (BP Location: Right arm, Cuff Size: Adult Regular)  Pulse 90  Resp 12  Ht 1.626 m (5' 4\")  Wt 64.2 kg (141 lb 8 oz)  SpO2 94%  BMI 24.29 kg/m2    Constitutional:           Skin:             Head:           Eyes:           Lymph:      ENT:           Neck:           Respiratory:            Cardiac:                                                           GI:           Extremities and Muscular Skeletal:                 Neurological:           Psych:           CC  No referring provider defined for this encounter.              "

## 2018-11-15 NOTE — LETTER
11/15/2018      Dheeraj Jj MD  [address] [facility] [address]      RE: Kathryn Haynesdanial       Dear Colleague,    I had the pleasure of seeing Kathryn Banks in the [facility].    Service Date: 11/15/2018      HISTORY OF PRESENT ILLNESS:  I saw Ms. Banks for followup of atrial fibrillation.  She is a 78-year-old white female with a history of coronary artery disease and previous atrial flutter ablation.  Atrial flutter ablation was performed successfully on 06/07/2017.  When I saw her on 08/10/2017 she was doing well in sinus rhythm.  Not surprisingly, somewhere in late 2017 she developed atrial fibrillation.  She has been having paroxysmal atrial fibrillation in the recent past that was observed during hemodialysis.  She is not aware of palpitation or shortness of breath during the atrial fibrillation.  She told me that she was in the hospital for atrial fibrillation recently.  For unknown reason her diltiazem was stopped.  She is currently on Lopressor 25 mg p.o. b.i.d.  Again, she does not feel palpitation or other atrial fibrillation symptoms.  The EKG on 10/01 showed an episode of atrial fibrillation with a ventricular rate 114 beats.      The patient has developed renal failure and currently is having regular hemodialysis.      PHYSICAL EXAMINATION:   VITAL SIGNS:  Blood pressure was 140/62, heart rate 90 beats per minute, body weight 141 pounds.   GENERAL:  She is using a wheelchair.   LUNGS:  Clear.   CARDIAC:  Rhythm was regular, and the heart sounds were normal with no murmur.   ABDOMEN:  Examination showed no hepatomegaly.   EXTREMITIES:  There is no pedal edema.      ASSESSMENT AND RECOMMENDATIONS:  Ms. Banks is a 78-year-old white female with documented coronary artery disease and preserved ejection fraction.  She now has developed renal failure and is on hemodialysis.  She has paroxysmal atrial fibrillation with mildly fast ventricular rate but no  apparent symptoms.  I agree for continuation of warfarin.  I will attempt rate control by using medications.  The dose of Lopressor is increased from 25 to 50 mg p.o. b.i.d.  She will wear a Zio Patch monitor for 2 weeks for assessment of ventricular rate during recurrent atrial fibrillation.  If the rate is properly controlled, we will continue medical therapy indefinitely.  She is scheduled to return for review of the Zio Patch monitor results in January.  However, if she is found to have a rapid ventricular rate from the Zio Patch monitor, we may change the dose of metoprolol further before the next clinic visit.      cc:   [place] [address]         SUSAN GONZÁLES MD             D: 11/15/2018   T: 11/15/2018   MT: ROSA      Name:     MARI HERNANDEZ   MRN:      [id number]        Account:      [id number]   :      1940           Service Date: 11/15/2018      Document: [id number]           Outpatient Encounter Prescriptions as of 11/15/2018   Medication Sig Dispense Refill     ACETAMINOPHEN PO Take 1,000 mg by mouth 3 times daily And give 500 mg PO every 12 hours as needed       B Complex-C-Folic Acid (RENAL) 1 MG CAPS Take 1 capsule by mouth daily       calcium acetate (PHOSLO) 667 MG CAPS capsule Take 667 mg by mouth 3 times daily (with meals) And 2 times daily with snacks       loperamide (IMODIUM) 2 MG capsule Take 1 capsule (2 mg) by mouth daily Every Monday, Wednesday and Friday prior to dialysis 40 capsule 1     metoprolol tartrate (LOPRESSOR) 25 MG tablet Take 2 tablets (50 mg) by mouth 2 times daily 180 tablet 1     NEPHROCAPS (TRIPHROCAPS) 1 MG capsule Take 1 capsule by mouth daily 90 capsule 3     nitroGLYcerin (NITROSTAT) 0.4 MG sublingual tablet Place 1 tablet (0.4 mg) under the tongue every 5 minutes as needed for chest pain 25 tablet 0     pentoxifylline (TRENTAL) 400 MG CR tablet Take 1 tablet (400 mg) by mouth daily 90  tablet 0     pravastatin (PRAVACHOL) 40 MG tablet Take 1 tablet (40 mg) by mouth daily 90 tablet 3     senna-docusate (SENOKOT-S;PERICOLACE) 8.6-50 MG per tablet Take 2 tablets by mouth 2 times daily as needed for constipation       warfarin (COUMADIN) 2.5 MG tablet Take 5 mg Mon, Fri and 2.5 mg all other days or as directed by the coumadin clinic. 40 tablet 1     [DISCONTINUED] ORDER FOR DME Equipment being ordered: cpap machine, mask, humidifier, tubing and filters 1 Device 0     pramipexole (MIRAPEX) 0.125 MG tablet Take 2 tablets (0.25 mg) by mouth At Bedtime 90 tablet 3     [DISCONTINUED] diltiazem 300 MG 24 hr capsule Take 1 capsule (300 mg) by mouth daily (Patient not taking: Reported on 11/15/2018) 30 capsule 1     [DISCONTINUED] metoprolol tartrate (LOPRESSOR) 25 MG tablet Take 1 tablet (25 mg) by mouth 2 times daily 180 tablet 1     No facility-administered encounter medications on file as of 11/15/2018.        Again, thank you for allowing me to participate in the care of your patient.      Sincerely,    Thaddeus King MD     [facility]

## 2018-11-15 NOTE — MR AVS SNAPSHOT
After Visit Summary   11/15/2018    Kathryn Banks    MRN: [id number]           Patient Information     Date Of Birth          1940        Visit Information        Provider Department      11/15/2018 1:30 PM Thaddeus King MD [facility]        Today's Diagnoses     Paroxysmal atrial fibrillation (H)    -  1    Essential hypertension with goal blood pressure less than 140/90           Follow-ups after your visit        Additional Services     Follow-Up with Cardiac Advanced Practice Provider                 Future tests that were ordered for you today     Open Future Orders        Priority Expected Expires Ordered    Follow-Up with Cardiac Advanced Practice Provider Routine 1/14/2019 11/15/2019 11/15/2018    Zio Patch Monitor Routine 11/22/2018 11/15/2019 11/15/2018            Who to contact     If you have questions or need follow up information about today's clinic visit or your schedule please contact [facility] directly at [phone].  Normal or non-critical lab and imaging results will be communicated to you by [employer], letter or phone within 4 business days after the clinic has received the results. If you do not hear from us within 7 days, please contact the clinic through [employer] or phone. If you have a critical or abnormal lab result, we will notify you by phone as soon as possible.  Submit refill requests through [employer] or call your pharmacy and they will forward the refill request to us. Please allow 3 business days for your refill to be completed.          Additional Information About Your Visit        MyChart Information     [employer] gives you secure access to your electronic health record. If you see a primary care provider, you can also send messages to your care team and make appointments. If you have questions, please call your primary care clinic.  If you do not have a primary care provider,  "please call [phone] and they will assist you.        Care EveryWhere ID     This is your Care EveryWhere ID. This could be used by other organizations to access your [facility] medical records  [id number]        Your Vitals Were     Pulse Respirations Height Pulse Oximetry BMI (Body Mass Index)       90 12 1.626 m (5' 4\") 94% 24.29 kg/m2        Blood Pressure from Last 3 Encounters:   11/15/18 140/62   10/02/18 138/66   10/01/18 117/46    Weight from Last 3 Encounters:   11/15/18 64.2 kg (141 lb 8 oz)   10/02/18 66.7 kg (147 lb)   10/01/18 67.5 kg (148 lb 12.8 oz)                 Today's Medication Changes          These changes are accurate as of 11/15/18  1:53 PM.  If you have any questions, ask your nurse or doctor.               These medicines have changed or have updated prescriptions.        Dose/Directions    metoprolol tartrate 25 MG tablet   Commonly known as:  LOPRESSOR   This may have changed:  how much to take   Used for:  Essential hypertension with goal blood pressure less than 140/90   Changed by:  Thaddeus King MD        Dose:  50 mg   Take 2 tablets (50 mg) by mouth 2 times daily   Quantity:  180 tablet   Refills:  1            Where to get your medicines      These medications were sent to [place] [address]  [address] [facility] [address]     Phone:  [phone]     metoprolol tartrate 25 MG tablet                Primary Care Provider Office Phone # Fax #    Dheeraj Jj [phone]       [address] [place] [address]        Equal Access to Services     [facility]: Hadii pascual Garcia, waaxda luqadaha, qaybta kaaltay mojica. So [facility] [phone].    ATENCIÓN: Si habla español, tiene a medrano disposición servicios gratuitos de asistencia lingüística. Vicki clark [phone].    We comply with applicable federal civil rights laws and [place] laws. We do not discriminate " on the basis of race, color, national origin, age, disability, sex, sexual orientation, or gender identity.            Thank you!     Thank you for choosing [facility]  for your care. Our goal is always to provide you with excellent care. Hearing back from our patients is one way we can continue to improve our services. Please take a few minutes to complete the written survey that you may receive in the mail after your visit with us. Thank you!             Your Updated Medication List - Protect others around you: Learn how to safely use, store and throw away your medicines at www.disposemymeds.org.          This list is accurate as of 11/15/18  1:53 PM.  Always use your most recent med list.                   Brand Name Dispense Instructions for use Diagnosis    ACETAMINOPHEN PO      Take 1,000 mg by mouth 3 times daily And give 500 mg PO every 12 hours as needed        calcium acetate 667 MG Caps capsule    PHOSLO     Take 667 mg by mouth 3 times daily (with meals) And 2 times daily with snacks        loperamide 2 MG capsule    IMODIUM    40 capsule    Take 1 capsule (2 mg) by mouth daily Every Monday, Wednesday and Friday prior to dialysis    Diarrhea, unspecified type       metoprolol tartrate 25 MG tablet    LOPRESSOR    180 tablet    Take 2 tablets (50 mg) by mouth 2 times daily    Essential hypertension with goal blood pressure less than 140/90       nitroGLYcerin 0.4 MG sublingual tablet    NITROSTAT    25 tablet    Place 1 tablet (0.4 mg) under the tongue every 5 minutes as needed for chest pain    Hx of non-ST elevation myocardial infarction (NSTEMI), Atherosclerosis of native coronary artery of native heart without angina pectoris, Postsurgical aortocoronary bypass status       order for DME     1 Device    Equipment being ordered: cpap machine, mask, humidifier, tubing and filters    ANABEL (obstructive sleep apnea)       pentoxifylline 400 MG CR tablet    TRENtal     90 tablet    Take 1 tablet (400 mg) by mouth daily    PAD (peripheral artery disease) (H)       pramipexole 0.125 MG tablet    MIRAPEX    90 tablet    Take 2 tablets (0.25 mg) by mouth At Bedtime    Restless leg syndrome       pravastatin 40 MG tablet    PRAVACHOL    90 tablet    Take 1 tablet (40 mg) by mouth daily    Hx of non-ST elevation myocardial infarction (NSTEMI), Atherosclerosis of native coronary artery of native heart without angina pectoris       * RENAL 1 MG Caps      Take 1 capsule by mouth daily        * NEPHROCAPS 1 MG capsule     90 capsule    Take 1 capsule by mouth daily    Acute renal failure superimposed on stage 4 chronic kidney disease, unspecified acute renal failure type (H)       senna-docusate 8.6-50 MG per tablet    SENOKOT-S;PERICOLACE     Take 2 tablets by mouth 2 times daily as needed for constipation        warfarin 2.5 MG tablet    COUMADIN    40 tablet    Take 5 mg Mon, Fri and 2.5 mg all other days or as directed by the coumadin clinic.    Paroxysmal atrial fibrillation (H), Long term current use of anticoagulant therapy       * Notice:  This list has 2 medication(s) that are the same as other medications prescribed for you. Read the directions carefully, and ask your doctor or other care provider to review them with you.

## 2018-11-15 NOTE — LETTER
11/15/2018    Dheeraj Jj MD  [address] [facility] [address]    RE: Kathryn Banks       Dear Colleague,    I had the pleasure of seeing Kathryn Banks in the [facility].    HPI and Plan:   See dictation    Orders Placed This Encounter   Procedures     Follow-Up with Cardiac Advanced Practice Provider     Zio Patch Monitor       Orders Placed This Encounter   Medications     metoprolol tartrate (LOPRESSOR) 25 MG tablet     Sig: Take 2 tablets (50 mg) by mouth 2 times daily     Dispense:  180 tablet     Refill:  1       Medications Discontinued During This Encounter   Medication Reason     diltiazem 300 MG 24 hr capsule Not filled/taken by Patient     metoprolol tartrate (LOPRESSOR) 25 MG tablet Reorder         Encounter Diagnoses   Name Primary?     Essential hypertension with goal blood pressure less than 140/90      Paroxysmal atrial fibrillation (H) Yes       CURRENT MEDICATIONS:  Current Outpatient Prescriptions   Medication Sig Dispense Refill     ACETAMINOPHEN PO Take 1,000 mg by mouth 3 times daily And give 500 mg PO every 12 hours as needed       B Complex-C-Folic Acid (RENAL) 1 MG CAPS Take 1 capsule by mouth daily       calcium acetate (PHOSLO) 667 MG CAPS capsule Take 667 mg by mouth 3 times daily (with meals) And 2 times daily with snacks       loperamide (IMODIUM) 2 MG capsule Take 1 capsule (2 mg) by mouth daily Every Monday, Wednesday and Friday prior to dialysis 40 capsule 1     metoprolol tartrate (LOPRESSOR) 25 MG tablet Take 2 tablets (50 mg) by mouth 2 times daily 180 tablet 1     NEPHROCAPS (TRIPHROCAPS) 1 MG capsule Take 1 capsule by mouth daily 90 capsule 3     nitroGLYcerin (NITROSTAT) 0.4 MG sublingual tablet Place 1 tablet (0.4 mg) under the tongue every 5 minutes as needed for chest pain 25 tablet 0     ORDER FOR DME Equipment being ordered: cpap machine, mask, humidifier, tubing and filters 1 Device 0     pentoxifylline (TRENTAL) 400 MG CR  tablet Take 1 tablet (400 mg) by mouth daily 90 tablet 0     pravastatin (PRAVACHOL) 40 MG tablet Take 1 tablet (40 mg) by mouth daily 90 tablet 3     senna-docusate (SENOKOT-S;PERICOLACE) 8.6-50 MG per tablet Take 2 tablets by mouth 2 times daily as needed for constipation       warfarin (COUMADIN) 2.5 MG tablet Take 5 mg Mon, Fri and 2.5 mg all other days or as directed by the coumadin clinic. 40 tablet 1     pramipexole (MIRAPEX) 0.125 MG tablet Take 2 tablets (0.25 mg) by mouth At Bedtime 90 tablet 3     [DISCONTINUED] metoprolol tartrate (LOPRESSOR) 25 MG tablet Take 1 tablet (25 mg) by mouth 2 times daily 180 tablet 1       ALLERGIES     Allergies   Allergen Reactions     Ciprofloxacin Nausea and Vomiting     Zofran did not help     Oxycodone Visual Disturbance     Delusions, blackouts      Lisinopril Cough     Sulfa Drugs GI Disturbance     LOSS OF TASTE     Tape [Adhesive Tape]      Penicillins Other (See Comments)     Swelling and reddness at injection site, pt wanted it on        PAST MEDICAL HISTORY:  Past Medical History:   Diagnosis Date     Carpal tunnel syndrome      Coronary atherosclerosis of native coronary artery 2006    5 vessel CABG     OBSTRUCTIVE SLEEP APNEA     using CPAP     Osteoarthrosis, unspecified whether generalized or localized, unspecified site     generalized arthritis, particularly in her hands and feet         Other and combined forms of senile cataract 2000    Bilateral     Other and unspecified hyperlipidemia      Paroxysmal atrial fibrillation (H)      Renal failure     on hemodialysis     RESTLESS LEGS SYNDROME      TENOSYNOV HAND/WRIST NEC 6/30/2006     Type II or unspecified type diabetes mellitus without mention of complication, not stated as uncontrolled 2001    Diabetes mellitus/pt is diet controlled with weight loss     Typical atrial flutter (H) 01/17/2007    ablation 6/7/2017     Unspecified essential hypertension        PAST SURGICAL HISTORY:  Past Surgical History:    Procedure Laterality Date     ANGIOPLASTY       C APPENDECTOMY       C CABG, VEIN, FIVE      SVG x 4 and LIMA to LAD     C SOTO W/O FACETEC FORAMOT/DSKC  VRT SEG, LUMBAR       C REMV CATARACT INTRACAP,INSERT LENS      Bilateral     C TOTAL ABDOM HYSTERECTOMY       - fibroids     C VAGOTOMY/PYLOROPLASTY,SELECT  1970     COLONOSCOPY  12/3/2007     COLONOSCOPY  2014    Procedure: COLONOSCOPY;  Colonoscopy;  Surgeon: Zack Stearns MD;  Location: [facility]     CREATE GRAFT LOOP ARTERIOVENOUS UPPER EXTREMITY Left 2018    Procedure: CREATE GRAFT ARTERIOVENOUS UPPER EXTREMITY;  Left Upper Arm Arteriovenous Graft Creation, Anesthesia Block ;  Surgeon: Cassie Cardenas MD;  Location: [facility]     CYSTOSCOPY  09    [facility]     ENDOSCOPY  2000    [facility]     HC COLONOSCOPY THRU STOMA, DIAGNOSTIC  10/7/04    poor prep, repeat in 2-3 years     HC COLONOSCOPY W/WO BRUSH/WASH  10/31/07    Repeat-poor quality prep     HC REMOVAL OF OVARY/TUBE(S)      Salpingo-Oophorectomy, Unilateral     HC REVISE MEDIAN N/CARPAL TUNNEL SURG      Carpal tunnel release     HC UGI ENDOSCOPY DIAG W BIOPSY  10/31/07     HC UGI ENDOSCOPY, SIMPLE EXAM  12/3/2007     LAPAROTOMY, LYSIS ADHESIONS, COMBINED N/A 10/24/2017    Procedure: COMBINED LAPAROTOMY, LYSIS ADHESIONS;  REPAIR FOCAL ILEAL SMALL BOWEL PERFERATION, LYSIS OF ADHESIONS.;  Surgeon: Rashard Zafar MD;  Location:  OR     OPEN REDUCTION INTERNAL FIXATION HIP NAILING Left 7/3/2016    Procedure: OPEN REDUCTION INTERNAL FIXATION HIP NAILING;  Surgeon: Lake Schulz MD;  Location:  OR       FAMILY HISTORY:  Family History   Problem Relation Age of Onset     Diabetes Father      AODM     Neurologic Disorder Father      Parkinson's     Blood Disease Father       from blood clot from leg to lung immediately after hip fracture     Diabetes Paternal Grandmother      adult onset     Diabetes Maternal Grandmother      adult onset     Hypertension No  "family hx of      Cerebrovascular Disease No family hx of        SOCIAL HISTORY:  Social History     Social History     Marital status:      Spouse name: Urbano Banks     Number of children: 2     Years of education: 13     Occupational History     Ministry coordinator      [employer]     Social History Main Topics     Smoking status: Former Smoker     Years: 10.00     Quit date: 1/1/1969     Smokeless tobacco: Never Used     Alcohol use 0.0 oz/week     0 Standard drinks or equivalent per week      Comment: 1/2 a beer once a month     Drug use: No     Sexual activity: Yes     Birth control/ protection: Surgical     Other Topics Concern     Parent/Sibling W/ Cabg, Mi Or Angioplasty Before 65f 55m? No     Social History Narrative       Review of Systems:  Skin:  Negative       Eyes:  Positive for glasses    ENT:  Negative      Respiratory:  Positive for shortness of breath;dyspnea on exertion     Cardiovascular:  Negative for;palpitations;chest pain;lightheadedness;dizziness Positive for;edema;fatigue;exercise intolerance    Gastroenterology: Negative      Genitourinary:  Negative      Musculoskeletal:  Negative      Neurologic:  Positive for numbness or tingling of feet neuropathy in feet   Psychiatric:  Positive for sleep disturbances not much sleep   Heme/Lymph/Imm:  Positive for      Endocrine:  Positive for diabetes      Physical Exam:  Vitals: /62 (BP Location: Right arm, Cuff Size: Adult Regular)  Pulse 90  Resp 12  Ht 1.626 m (5' 4\")  Wt 64.2 kg (141 lb 8 oz)  SpO2 94%  BMI 24.29 kg/m2    Constitutional:           Skin:             Head:           Eyes:           Lymph:      ENT:           Neck:           Respiratory:            Cardiac:                                                           GI:           Extremities and Muscular Skeletal:                 Neurological:           Psych:           CC  No referring provider defined for this " encounter.                Service Date: 11/15/2018      HISTORY OF PRESENT ILLNESS:  I saw Ms. Banks for followup of atrial fibrillation.  She is a 78-year-old white female with a history of coronary artery disease and previous atrial flutter ablation.  Atrial flutter ablation was performed successfully on 06/07/2017.  When I saw her on 08/10/2017 she was doing well in sinus rhythm.  Not surprisingly, somewhere in late 2017 she developed atrial fibrillation.  She has been having paroxysmal atrial fibrillation in the recent past that was observed during hemodialysis.  She is not aware of palpitation or shortness of breath during the atrial fibrillation.  She told me that she was in the hospital for atrial fibrillation recently.  For unknown reason her diltiazem was stopped.  She is currently on Lopressor 25 mg p.o. b.i.d.  Again, she does not feel palpitation or other atrial fibrillation symptoms.  The EKG on 10/01 showed an episode of atrial fibrillation with a ventricular rate 114 beats.      The patient has developed renal failure and currently is having regular hemodialysis.      PHYSICAL EXAMINATION:   VITAL SIGNS:  Blood pressure was 140/62, heart rate 90 beats per minute, body weight 141 pounds.   GENERAL:  She is using a wheelchair.   LUNGS:  Clear.   CARDIAC:  Rhythm was regular, and the heart sounds were normal with no murmur.   ABDOMEN:  Examination showed no hepatomegaly.   EXTREMITIES:  There is no pedal edema.      ASSESSMENT AND RECOMMENDATIONS:  Ms. Banks is a 78-year-old white female with documented coronary artery disease and preserved ejection fraction.  She now has developed renal failure and is on hemodialysis.  She has paroxysmal atrial fibrillation with mildly fast ventricular rate but no apparent symptoms.  I agree for continuation of warfarin.  I will attempt rate control by using medications.  The dose of Lopressor is increased from 25 to 50 mg p.o. b.i.d.  She will wear a Zio Patch  monitor for 2 weeks for assessment of ventricular rate during recurrent atrial fibrillation.  If the rate is properly controlled, we will continue medical therapy indefinitely.  She is scheduled to return for review of the Zio Patch monitor results in January.  However, if she is found to have a rapid ventricular rate from the Zio Patch monitor, we may change the dose of metoprolol further before the next clinic visit.      cc:   [place] [address]         SUSAN KING MD             D: 11/15/2018   T: 11/15/2018   MT: ROSA      Name:     MARI HERNANDEZ   MRN:      [id number]        Account:      [id number]   :      1940           Service Date: 11/15/2018      Document: [id number]        Thank you for allowing me to participate in the care of your patient.      Sincerely,     Susan King MD     [facility]    cc:   No referring provider defined for this encounter.

## 2018-11-16 NOTE — PROGRESS NOTES
ANTICOAGULATION FOLLOW-UP CLINIC VISIT    Patient Name:  Kathryn Banks  Date:  11/16/2018  Contact Type:  Telephone    SUBJECTIVE:     Patient Findings     Positives Unexplained INR or factor level change    Comments Reason for Call:  INR     Who is calling?  [facility]: [facility]     Phone number:  [phone]     Fax number:       Name of caller: Eyal Taylor     INR Value:  3.9     Are there any other concerns:  No     Can we leave a detailed message on this number? YES     Phone number patient can be reached at: Other phone number:  [phone]*        Call taken on 11/16/2018 at 12:47 PM by Katlyn Gavin           OBJECTIVE    INR   Date Value Ref Range Status   11/16/2018 3.9  Final       ASSESSMENT / PLAN  INR assessment SUPRA    Recheck INR In: 4 DAYS    INR Location Homecare INR      Anticoagulation Summary as of 11/16/2018     INR goal 2.0-3.0    Today's INR 3.9!    Warfarin maintenance plan 5 mg (2.5 mg x 2) on Mon, Fri; 2.5 mg (2.5 mg x 1) all other days    Full warfarin instructions 11/16: 1.25 mg; 11/19: 2.5 mg; Otherwise 5 mg on Mon, Fri; 2.5 mg all other days    Weekly warfarin total 22.5 mg    Plan last modified Nicki Calvillo, RN (9/20/2018)    Next INR check 11/20/2018    Target end date       Anticoagulation Episode Summary     INR check location     Preferred lab     Send INR reminders to [facility]    Comments 2.5 mg tabs      Anticoagulation Care Providers     Provider Role Specialty Phone number    Dheeraj Jj MD [facility] Internal Medicine [phone]            See the Encounter Report to view Anticoagulation Flowsheet and Dosing Calendar (Go to Encounters tab in chart review, and find the Anticoagulation Therapy Visit)    Dosage adjustment made based on physician directed care plan.    Nicki Calvillo, RN

## 2018-11-16 NOTE — TELEPHONE ENCOUNTER
Reason for Call:  INR    Who is calling?  [facility]: [facility]    Phone number:  [phone]    Fax number:      Name of caller: Eyal Taylor    INR Value:  3.9    Are there any other concerns:  No    Can we leave a detailed message on this number? YES    Phone number patient can be reached at: Other phone number:  [phone]*      Call taken on 11/16/2018 at 12:47 PM by Katlyn Gavin

## 2018-11-16 NOTE — MR AVS SNAPSHOT
Kathryn MICKY Banks   11/16/2018   Anticoagulation Therapy Visit    Description:  78 year old female   Provider:  Dheeraj Jj MD   Department:  Ph Anticoag           INR as of 11/16/2018     Today's INR 3.9!      Anticoagulation Summary as of 11/16/2018     INR goal 2.0-3.0    Today's INR 3.9!    Full warfarin instructions 11/16: 1.25 mg; 11/19: 2.5 mg; Otherwise 5 mg on Mon, Fri; 2.5 mg all other days    Next INR check 11/20/2018      Contact Numbers     Clinic Number:         November 2018 Details    Sun Mon Tue Wed Thu Fri Sat         1               2               3                 4               5               6               7               8               9               10                 11               12               13               14               15               16      1.25 mg   See details      17      2.5 mg           18      2.5 mg         19      2.5 mg         20            21               22               23               24                 25               26               27               28               29               30                 Date Details   11/16 This INR check       Date of next INR:  11/20/2018         How to take your warfarin dose     To take:  1.25 mg Take 0.5 of a 2.5 mg tablet.    To take:  2.5 mg Take 1 of the 2.5 mg tablets.

## 2018-11-16 NOTE — TELEPHONE ENCOUNTER
Forms received from [facility]   on 1116/18 for med rec.  Forms with RN to review medications.  Orders pended for provider to sign.    Forms given to PCP for signature on .

## 2018-11-16 NOTE — PROGRESS NOTES
Service Date: 11/15/2018      HISTORY OF PRESENT ILLNESS:  I saw Ms. Banks for followup of atrial fibrillation.  She is a 78-year-old white female with a history of coronary artery disease and previous atrial flutter ablation.  Atrial flutter ablation was performed successfully on 06/07/2017.  When I saw her on 08/10/2017 she was doing well in sinus rhythm.  Not surprisingly, somewhere in late 2017 she developed atrial fibrillation.  She has been having paroxysmal atrial fibrillation in the recent past that was observed during hemodialysis.  She is not aware of palpitation or shortness of breath during the atrial fibrillation.  She told me that she was in the hospital for atrial fibrillation recently.  For unknown reason her diltiazem was stopped.  She is currently on Lopressor 25 mg p.o. b.i.d.  Again, she does not feel palpitation or other atrial fibrillation symptoms.  The EKG on 10/01 showed an episode of atrial fibrillation with a ventricular rate 114 beats.      The patient has developed renal failure and currently is having regular hemodialysis.      PHYSICAL EXAMINATION:   VITAL SIGNS:  Blood pressure was 140/62, heart rate 90 beats per minute, body weight 141 pounds.   GENERAL:  She is using a wheelchair.   LUNGS:  Clear.   CARDIAC:  Rhythm was regular, and the heart sounds were normal with no murmur.   ABDOMEN:  Examination showed no hepatomegaly.   EXTREMITIES:  There is no pedal edema.      ASSESSMENT AND RECOMMENDATIONS:  Ms. Banks is a 78-year-old white female with documented coronary artery disease and preserved ejection fraction.  She now has developed renal failure and is on hemodialysis.  She has paroxysmal atrial fibrillation with mildly fast ventricular rate but no apparent symptoms.  I agree for continuation of warfarin.  I will attempt rate control by using medications.  The dose of Lopressor is increased from 25 to 50 mg p.o. b.i.d.  She will wear a Zio Patch monitor for 2 weeks for  assessment of ventricular rate during recurrent atrial fibrillation.  If the rate is properly controlled, we will continue medical therapy indefinitely.  She is scheduled to return for review of the Zio Patch monitor results in January.  However, if she is found to have a rapid ventricular rate from the Zio Patch monitor, we may change the dose of metoprolol further before the next clinic visit.      cc:   [place] [address]         SUSAN GONZÁLES MD             D: 11/15/2018   T: 11/15/2018   MT: ROSA      Name:     MARI HERNANDEZ   MRN:      [id number]        Account:      [id number]   :      1940           Service Date: 11/15/2018      Document: [id number]

## 2018-11-20 NOTE — TELEPHONE ENCOUNTER
Med reconciliation completed to best of ability. Patient currently hospitalized. Expect new med list upon discharge.    Christina Hernandez RN

## 2018-11-21 NOTE — TELEPHONE ENCOUNTER
Reason for Call: Request for an order or referral:    Order or referral being requested: Needs verbal home care to resume Friday November 23rd with an INR check as well. Was discharged from [facility] yesterday.     Date needed: as soon as possible    Has the patient been seen by the PCP for this problem? YES    Additional comments:     Phone number Patient can be reached at:  Other phone number:  [phone]    Best Time:  any    Can we leave a detailed message on this number?  YES    Call taken on 11/21/2018 at 12:50 PM by Sienna Lafleur

## 2018-11-22 NOTE — PROGRESS NOTES
[facility] now requests orders and shares plan of care/discharge summaries for some patients through [employer].  Please REPLY TO THIS MESSAGE OR ROUTE BACK TO THE AUTHOR in order to give authorization for orders when needed.  This is considered a verbal order, you will still receive a faxed copy of orders for signature.  Thank you for your assistance in improving collaboration for our patients.    MD SUMMARY/PLAN OF CARE  Patient was recently hospitalized and discharge paper work stated to continue to take Mirapex 0.125mg at bedtime, patient has been taking it 3 times a day, is it ok to continue to take 3 times a day?

## 2018-11-23 NOTE — TELEPHONE ENCOUNTER
Reason for Call:  INR    Who is calling?  Home Care:    Phone number:  [phone]    Name of caller: Dasha    INR Value:  Patient is out shopping, so not able to do today, Dasha would like to know if patient can wait until Monday to do this, please call    Are there any other concerns:  No    Can we leave a detailed message on this number? YES    Phone number patient can be reached at: Other phone number:  [phone]      Call taken on 11/23/2018 at 9:16 AM by Tabitha Bland

## 2018-11-23 NOTE — TELEPHONE ENCOUNTER
Per Dasha, pt was admitted to [facility] 11/19-20. Per the discharge paperwork, Dasha said that pt got 2.5 mg on Tues 11/20, but unsure about Monday 11/19, and she was advised to take 5 mg Mon, Fri and 2.5 mg ROW.   I have advised Dasha to have pt take 5 mg today and 2.5 mg Sat, Sun, and recheck on Monday.   Flow sheet updated  Zo Davis RN

## 2018-11-26 NOTE — TELEPHONE ENCOUNTER
Reason for Call:  INR    Who is calling?  [facility]: [facility]     Phone number:  [phone]    Fax number:  NA     Name of caller:  Danuta     INR Value:  1.2    Are there any other concerns:  No    Can we leave a detailed message on this number? YES    Phone number patient can be reached at: Home number on file [phone] (home)      Call taken on 11/26/2018 at 12:57 PM by Zahira Diaz

## 2018-11-26 NOTE — PROGRESS NOTES
ANTICOAGULATION FOLLOW-UP CLINIC VISIT    Patient Name:  Kathryn Banks  Date:  11/26/2018  Contact Type:  Telephone/ [facility] - homecare    SUBJECTIVE:     Patient Findings     Positives Missed doses (Only took 2.5 mg Fri)           OBJECTIVE    INR   Date Value Ref Range Status   11/26/2018 1.2  Final       ASSESSMENT / PLAN  INR assessment SUB    Recheck INR In: 3 DAYS    INR Location [facility] INR      Anticoagulation Summary as of 11/26/2018     INR goal 2.0-3.0    Today's INR 1.2!    Warfarin maintenance plan 5 mg (2.5 mg x 2) on Mon, Fri; 2.5 mg (2.5 mg x 1) all other days    Full warfarin instructions 11/28: 5 mg; Otherwise 5 mg on Mon, Fri; 2.5 mg all other days    Weekly warfarin total 22.5 mg    Plan last modified Nicki Calvillo RN (9/20/2018)    Next INR check 11/29/2018    Target end date       Anticoagulation Episode Summary     INR check location     Preferred lab     Send INR reminders to [facility] POOL    Comments 2.5 mg tabs      Anticoagulation Care Providers     Provider Role Specialty Phone number    Dheeraj Jj MD [facility] Internal Medicine [phone]            See the Encounter Report to view Anticoagulation Flowsheet and Dosing Calendar (Go to Encounters tab in chart review, and find the Anticoagulation Therapy Visit)    Dosage adjustment made based on physician directed care plan.    Gianni Palumbo RN

## 2018-11-26 NOTE — MR AVS SNAPSHOT
Kathryn MICKY Banks   11/26/2018   Anticoagulation Therapy Visit    Description:  78 year old female   Provider:  Dheeraj Jj MD   Department:  Ph Anticoag           INR as of 11/26/2018     Today's INR 1.2!      Anticoagulation Summary as of 11/26/2018     INR goal 2.0-3.0    Today's INR 1.2!    Full warfarin instructions 11/28: 5 mg; Otherwise 5 mg on Mon, Fri; 2.5 mg all other days    Next INR check 11/29/2018      Contact Numbers     Clinic Number:         November 2018 Details    Sun Mon Tue Wed Thu Fri Sat         1               2               3                 4               5               6               7               8               9               10                 11               12               13               14               15               16               17                 18               19               20               21               22               23               24                 25               26      5 mg   See details      27      2.5 mg         28      5 mg         29            30                 Date Details   11/26 This INR check       Date of next INR:  11/29/2018         How to take your warfarin dose     To take:  2.5 mg Take 1 of the 2.5 mg tablets.    To take:  5 mg Take 2 of the 2.5 mg tablets.

## 2018-11-29 NOTE — TELEPHONE ENCOUNTER
Reason for Call:  INR    Who is calling?  Home [facility]: [facility]     Phone number:  [phone]    Fax number:  NA     Name of caller: Danuta     INR Value:  1.4    Are there any other concerns:  No    Can we leave a detailed message on this number? YES    Phone number patient can be reached at: Home number on file [phone] (home)      Call taken on 11/29/2018 at 11:00 AM by Zahira Diaz

## 2018-11-29 NOTE — MR AVS SNAPSHOT
Kathryn WEEKS Jocelyn   11/29/2018   Anticoagulation Therapy Visit    Description:  78 year old female   Provider:  Dheeraj Jj MD   Department:  Ph Anticoag           INR as of 11/29/2018     Today's INR 1.4!      Anticoagulation Summary as of 11/29/2018     INR goal 2.0-3.0    Today's INR 1.4!    Full warfarin instructions 11/29: 5 mg; Otherwise 5 mg on Mon, Fri; 2.5 mg all other days    Next INR check 12/4/2018      Contact Numbers     Clinic Number:         November 2018 Details    Sun Mon Tue Wed Thu Fri Sat         1               2               3                 4               5               6               7               8               9               10                 11               12               13               14               15               16               17                 18               19               20               21               22               23               24                 25               26               27               28               29      5 mg   See details      30      5 mg           Date Details   11/29 This INR check               How to take your warfarin dose     To take:  5 mg Take 2 of the 2.5 mg tablets.           December 2018 Details    Sun Mon Tue Wed Thu Fri Sat           1      2.5 mg           2      2.5 mg         3      5 mg         4            5               6               7               8                 9               10               11               12               13               14               15                 16               17               18               19               20               21               22                 23               24               25               26               27               28               29                 30               31                     Date Details   No additional details    Date of next INR:  12/4/2018         How to take your warfarin dose     To take:  2.5 mg Take 1 of  the 2.5 mg tablets.    To take:  5 mg Take 2 of the 2.5 mg tablets.

## 2018-11-29 NOTE — PROGRESS NOTES
ANTICOAGULATION FOLLOW-UP CLINIC VISIT    Patient Name:  Kathryn Banks  Date:  11/29/2018  Contact Type:  Telephone/ [facility] - homecare    SUBJECTIVE:     Patient Findings     Positives Unexplained INR or factor level change    Comments Reason for Call:  INR     Who is calling?  [facility]: [facility]      Phone number:  [phone]     Fax number:  NA      Name of caller: Danuta      INR Value:  1.4     Are there any other concerns:  No     Can we leave a detailed message on this number? YES     Phone number patient can be reached at: Home number on file [phone] (home)        Call taken on 11/29/2018 at 11:00 AM by Zahira Diaz           OBJECTIVE    INR   Date Value Ref Range Status   11/29/2018 1.4  Final       ASSESSMENT / PLAN  INR assessment SUB    Recheck INR In: 5 DAYS    INR Location Homecare INR      Anticoagulation Summary as of 11/29/2018     INR goal 2.0-3.0    Today's INR 1.4!    Warfarin maintenance plan 5 mg (2.5 mg x 2) on Mon, Fri; 2.5 mg (2.5 mg x 1) all other days    Full warfarin instructions 11/29: 5 mg; Otherwise 5 mg on Mon, Fri; 2.5 mg all other days    Weekly warfarin total 22.5 mg    Plan last modified Nicki Calvillo, RN (9/20/2018)    Next INR check 12/4/2018    Target end date       Anticoagulation Episode Summary     INR check location     Preferred lab     Send INR reminders to [facility]    Comments 2.5 mg tabs      Anticoagulation Care Providers     Provider Role Specialty Phone number    Dheeraj Jj MD [facility] Internal Medicine [phone]            See the Encounter Report to view Anticoagulation Flowsheet and Dosing Calendar (Go to Encounters tab in chart review, and find the Anticoagulation Therapy Visit)    Dosage adjustment made based on physician directed care plan.    Gianni Palumbo, RN

## 2018-12-04 NOTE — MR AVS SNAPSHOT
Kathryn MICKY Banks   12/4/2018   Anticoagulation Therapy Visit    Description:  78 year old female   Provider:  Dheeraj Jj MD   Department:  Ph Anticoag           INR as of 12/4/2018     Today's INR 2.2      Anticoagulation Summary as of 12/4/2018     INR goal 2.0-3.0    Today's INR 2.2    Full warfarin instructions 5 mg on Mon, Fri; 2.5 mg all other days    Next INR check 12/11/2018      Contact Numbers     Clinic Number:         December 2018 Details    Sun Mon Tue Wed Thu Fri Sat           1                 2               3               4      2.5 mg   See details      5      2.5 mg         6      2.5 mg         7      5 mg         8      2.5 mg           9      2.5 mg         10      5 mg         11            12               13               14               15                 16               17               18               19               20               21               22                 23               24               25               26               27               28               29                 30               31                     Date Details   12/04 This INR check       Date of next INR:  12/11/2018         How to take your warfarin dose     To take:  2.5 mg Take 1 of the 2.5 mg tablets.    To take:  5 mg Take 2 of the 2.5 mg tablets.

## 2018-12-04 NOTE — PROGRESS NOTES
"  ANTICOAGULATION FOLLOW-UP CLINIC VISIT    Patient Name:  Kathryn Banks  Date:  12/4/2018  Contact Type:  Telephone/ [facility] - homecare    SUBJECTIVE:     Patient Findings     Positives Missed doses (Mariella states that Kathryn has been \"locked\" out of her med box since Friday - hasn't had Sat-Mon's coumadin dose. We will have her resume her maintenance dose and recheck in 1 week)    Comments Reason for Call:  INR     Who is calling?  Home Care:[facility]     Phone number:  [phone]     Name of caller: Mariella     INR Value:  2.2     Are there any other concerns:  No     Can we leave a detailed message on this number? YES     Phone number patient can be reached at: Other phone number:  [phone]        Call taken on 12/4/2018 at 1:04 PM by Tabitha Bland           OBJECTIVE    INR   Date Value Ref Range Status   12/04/2018 2.2  Final       ASSESSMENT / PLAN  INR assessment THER    Recheck INR In: 1 WEEK    INR Location [facility] INR      Anticoagulation Summary as of 12/4/2018     INR goal 2.0-3.0    Today's INR 2.2    Warfarin maintenance plan 5 mg (2.5 mg x 2) on Mon, Fri; 2.5 mg (2.5 mg x 1) all other days    Full warfarin instructions 5 mg on Mon, Fri; 2.5 mg all other days    Weekly warfarin total 22.5 mg    No change documented Gianni Palumbo RN    Plan last modified Nicki Calvillo RN (9/20/2018)    Next INR check 12/11/2018    Target end date       Anticoagulation Episode Summary     INR check location     Preferred lab     Send INR reminders to [facility] RANDI    Comments 2.5 mg tabs      Anticoagulation Care Providers     Provider Role Specialty Phone number    Dheeraj Jj MD [facility] Internal Medicine [phone]            See the Encounter Report to view Anticoagulation Flowsheet and Dosing Calendar (Go to Encounters tab in chart review, and find the Anticoagulation Therapy Visit)    Dosage adjustment made based on physician directed care plan.    Gianni Palumbo RN               "

## 2018-12-04 NOTE — TELEPHONE ENCOUNTER
Reason for Call:  INR    Who is calling?  [facility]    Phone number:  [phone]    Name of caller: Mariella    INR Value:  2.2    Are there any other concerns:  No    Can we leave a detailed message on this number? YES    Phone number patient can be reached at: Other phone number:  [phone]      Call taken on 12/4/2018 at 1:04 PM by Tabitha Bland

## 2018-12-06 NOTE — TELEPHONE ENCOUNTER
[phone]  Reason for Call:  Other appointment    Detailed comments: Patient needs a refill on her amiodarone. Would like this called into [facility] in [place]     Phone Number Patient can be reached at: Home number on file [phone] (home)    Best Time: any    Can we leave a detailed message on this number? YES    Call taken on 12/6/2018 at 1:03 PM by Arlet Monroe
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
## 2018-12-07 NOTE — TELEPHONE ENCOUNTER
Spoke to patient regarding amiodarone refill  from pharmacy.  Patient reports she was admitted to [facility] 11/19 and was discharged with the medication.  Called [facility] requesting records from the hospitalization as it is not in care everywhere to verify what did occur.  Patient indicated she was in afib when she was in the hospital.  Request for records for Dr King to review.  Last OV with Dr King was 11/15.  Patient was not on amiodarone and has not been on it since 2017.  Awaiting records for review.  ROSARIO Haider

## 2018-12-11 NOTE — TELEPHONE ENCOUNTER
Reason for Call:  INR    Who is calling?  Home Care: [facility]    Phone number:  [phone]    Fax number:      Name of caller:     INR Value:  1.2    Are there any other concerns:  Yes: Missed Sat, Sun & Mon doses    Can we leave a detailed message on this number? YES    Phone number patient can be reached at: Other phone number:  *      Call taken on 12/11/2018 at 12:00 PM by Josephine Tejeda

## 2018-12-11 NOTE — PROGRESS NOTES
ANTICOAGULATION FOLLOW-UP CLINIC VISIT    Patient Name:  Kathryn Banks  Date:  2018  Contact Type:  Telephone/ [facility], [facility] nurse     SUBJECTIVE:     Patient Findings     Positives:   Missed doses (pt missed doses on Sat, Sun, Mon )    Comments:   Reason for Call:  INR     Who is calling?  Home Care: [facility]     Phone number:  [phone]     Fax number:       Name of caller:      INR Value:  1.2     Are there any other concerns:  Yes: Missed Sat, Sun & Mon doses     Can we leave a detailed message on this number? YES     Phone number patient can be reached at: Other phone number:  *        Call taken on 2018 at 12:00 PM by Josephine Tejeda                 OBJECTIVE    INR   Date Value Ref Range Status   2018 1.2  Final       ASSESSMENT / PLAN  INR assessment SUB    Recheck INR In: 3 DAYS    INR Location Home[facility] INR      Anticoagulation Summary  As of 2018    INR goal:   2.0-3.0   TTR:   43.4 % (2.9 mo)   INR used for dosin.2! (2018)   Warfarin maintenance plan:   5 mg (2.5 mg x 2) every Mon, Fri; 2.5 mg (2.5 mg x 1) all other days   Full warfarin instructions:   : 5 mg; : 5 mg; Otherwise 5 mg every Mon, Fri; 2.5 mg all other days   Weekly warfarin total:   22.5 mg   Plan last modified:   Nicki Calvillo RN (2018)   Next INR check:   2018   Target end date:            Anticoagulation Episode Summary     INR check location:       Preferred lab:       Send INR reminders to:   [facility]    Comments:   2.5 mg tabs      Anticoagulation Care Providers     Provider Role Specialty Phone number    Dheeraj Jj MD [facility] Internal Medicine [phone]            See the Encounter Report to view Anticoagulation Flowsheet and Dosing Calendar (Go to Encounters tab in chart review, and find the Anticoagulation Therapy Visit)    Dosage adjustment made based on physician directed care plan.      Zo Davis, ROSARIO

## 2018-12-14 NOTE — PROGRESS NOTES
ANTICOAGULATION FOLLOW-UP CLINIC VISIT    Patient Name:  Kathryn Banks  Date:  2018  Contact Type:  Telephone/ [facility] - homecare    SUBJECTIVE:     Patient Findings     Positives:   Missed doses    Comments:   Reason for Call:  INR     Who is calling?  Home Care: [facility]     Phone number:  [phone]     Name of caller: Mariella     INR Value:  1.3     Are there any other concerns:  No     Can we leave a detailed message on this number? YES     Phone number patient can be reached at: Other phone number:  [phone]        Call taken on 2018 at 2:26 PM by Tabitha Bland             OBJECTIVE    INR   Date Value Ref Range Status   2018 1.3  Final       ASSESSMENT / PLAN  INR assessment SUB    Recheck INR In: 4 DAYS    INR Location [facility] INR      Anticoagulation Summary  As of 2018    INR goal:   2.0-3.0   TTR:   42.0 % (3 mo)   INR used for dosin.3! (2018)   Warfarin maintenance plan:   5 mg (2.5 mg x 2) every Mon, Fri; 2.5 mg (2.5 mg x 1) all other days   Full warfarin instructions:   : 7.5 mg; Otherwise 5 mg every Mon, Fri; 2.5 mg all other days   Weekly warfarin total:   22.5 mg   Plan last modified:   Nicki Calvillo RN (2018)   Next INR check:   2018   Target end date:            Anticoagulation Episode Summary     INR check location:       Preferred lab:       Send INR reminders to:   [facility]    Comments:   2.5 mg tabs      Anticoagulation Care Providers     Provider Role Specialty Phone number    Dheeraj Jj MD [facility] Internal Medicine [phone]            See the Encounter Report to view Anticoagulation Flowsheet and Dosing Calendar (Go to Encounters tab in chart review, and find the Anticoagulation Therapy Visit)    Dosage adjustment made based on physician directed care plan.    Gianni Palumbo RN

## 2018-12-14 NOTE — TELEPHONE ENCOUNTER
Reason for Call:  INR    Who is calling?  Home Care: [facility]    Phone number:  [phone]    Name of caller: Mariella    INR Value:  1.3    Are there any other concerns:  No    Can we leave a detailed message on this number? YES    Phone number patient can be reached at: Other phone number:  [phone]      Call taken on 12/14/2018 at 2:26 PM by Tabitha Bland

## 2018-12-18 NOTE — TELEPHONE ENCOUNTER
Attempted to contact Bharati, no answer.  left to call [facility] back to discuss INR of 1.2  Zo Davis RN

## 2018-12-18 NOTE — PROGRESS NOTES
[facility] now requests orders and shares plan of care/discharge summaries for some patients through [employer].  Please REPLY TO THIS MESSAGE OR ROUTE BACK TO THE AUTHOR in order to give authorization for orders when needed.  This is considered a verbal order, you will still receive a faxed copy of orders for signature.  Thank you for your assistance in improving collaboration for our patients.    ORDER  Skilled Nurse 1 wk 2    MD SUMMARY/PLAN OF CARE   Patient needs continued assistance with learning medications

## 2018-12-18 NOTE — PROGRESS NOTES
ANTICOAGULATION FOLLOW-UP CLINIC VISIT    Patient Name:  Kathryn Banks  Date:  2018  Contact Type:  Telephone/ [facility] nurse    SUBJECTIVE:     Patient Findings     Positives:   Change in diet/appetite (pt had some V8 this week), Unexplained INR or factor level change    Comments:   Reason for Call:  INR     Who is calling?  Home Care:      Phone number:  [phone]     Fax number:       Name of caller: Bharati     INR Value:  1.2     Are there any other concerns:  No     Can we leave a detailed message on this number? YES     Phone number patient can be reached at: Home number on file [phone] (home)        Call taken on 2018 at 1:40 PM by Jo-Ann Bartlett              OBJECTIVE    INR   Date Value Ref Range Status   2018 1.2  Final       ASSESSMENT / PLAN  INR assessment SUB    Recheck INR In: 3 DAYS    INR Location [facility] INR      Anticoagulation Summary  As of 2018    INR goal:   2.0-3.0   TTR:   40.2 % (3.2 mo)   INR used for dosin.2! (2018)   Warfarin maintenance plan:   No maintenance plan   Full warfarin instructions:   : 7.5 mg; : 5 mg; : 7.5 mg   Plan last modified:   Zo Davis, RN (2018)   Next INR check:   2018   Target end date:            Anticoagulation Episode Summary     INR check location:       Preferred lab:       Send INR reminders to:   [facility] CARL[facility] BRY[facility]    Comments:   2.5 mg tabs      Anticoagulation Care Providers     Provider Role Specialty Phone number    Dheeraj Jj MD [facility] Internal Medicine [phone]            See the Encounter Report to view Anticoagulation Flowsheet and Dosing Calendar (Go to Encounters tab in chart review, and find the Anticoagulation Therapy Visit)    Dosage adjustment made based on physician directed care plan.        Zo Davis, RN

## 2018-12-18 NOTE — TELEPHONE ENCOUNTER
Reason for Call:  INR    Who is calling?  Home Care:     Phone number:  [phone]    Fax number:      Name of caller: Bharati    INR Value:  1.2    Are there any other concerns:  No    Can we leave a detailed message on this number? YES    Phone number patient can be reached at: Home number on file [phone] (home)      Call taken on 12/18/2018 at 1:40 PM by Jo-Ann Bartlett

## 2018-12-21 NOTE — PROGRESS NOTES
ANTICOAGULATION FOLLOW-UP CLINIC VISIT    Patient Name:  Kathryn Banks  Date:  2018  Contact Type:  Telephone/ [facility] - homecare    SUBJECTIVE:     Patient Findings     Positives:   Missed doses (3 missed doses)    Comments:   Reason for Call:  INR     Who is calling?  Home Care:      Phone number:  [phone]     Name of caller: Bharati     INR Value:  1.3     Are there any other concerns:  Yes: patient missed all 3 doses this week, Nurse is in the home now      Can we leave a detailed message on this number? YES     Phone number patient can be reached at: Other phone number:  [phone]        Call taken on 2018 at 12:38 PM by Tabitha Bland             OBJECTIVE    INR   Date Value Ref Range Status   2018 1.3  Final       ASSESSMENT / PLAN  INR assessment SUB    Recheck INR In: 6 DAYS    INR Location [facility] INR      Anticoagulation Summary  As of 2018    INR goal:   2.0-3.0   TTR:   39.0 % (3.3 mo)   INR used for dosin.3! (2018)   Warfarin maintenance plan:   No maintenance plan   Full warfarin instructions:   : 7.5 mg; : 5 mg; : 2.5 mg; : 5 mg; : 2.5 mg; : 2.5 mg   Plan last modified:   Zo Davis RN (2018)   Next INR check:   2018   Target end date:            Anticoagulation Episode Summary     INR check location:       Preferred lab:       Send INR reminders to:   [facility] CARLOS BRYAN    Comments:   2.5 mg tabs      Anticoagulation Care Providers     Provider Role Specialty Phone number    Dheeraj Jj MD [facility] Internal Medicine [phone]            See the Encounter Report to view Anticoagulation Flowsheet and Dosing Calendar (Go to Encounters tab in chart review, and find the Anticoagulation Therapy Visit)    Dosage adjustment made based on physician directed care plan.    Gianni Palumbo, RN

## 2018-12-21 NOTE — TELEPHONE ENCOUNTER
Reason for Call:  INR    Who is calling?  Home Care:     Phone number:  [phone]    Name of caller: Bharati    INR Value:  1.3    Are there any other concerns:  Yes: patient missed all 3 doses this week, Nurse is in the home now     Can we leave a detailed message on this number? YES    Phone number patient can be reached at: Other phone number:  [phone]      Call taken on 12/21/2018 at 12:38 PM by Tabitha Bland

## 2018-12-22 NOTE — PROGRESS NOTES
[facility] now requests orders and shares plan of care/discharge summaries for some patients through [employer].  Please REPLY TO THIS MESSAGE OR ROUTE BACK TO THE AUTHOR in order to give authorization for orders when needed.  This is considered a verbal order, you will still receive a faxed copy of orders for signature.  Thank you for your assistance in improving collaboration for our patients.      MD SUMMARY/PLAN OF CARE    Patient missed all medications the last 3 days and reported can not find AM cassette, when asked she reports I am just tired of taking medications, Writer looked in kitchen and AM and noon cassettes sitting on counter as normal.

## 2018-12-24 NOTE — TELEPHONE ENCOUNTER
We have tried an alarmed med dispense however patient broke in 1st week and is unable to find receipt. Patient does not want to pay another 80 dollars for a new one

## 2018-12-27 NOTE — TELEPHONE ENCOUNTER
Reason for Call:  INR    Who is calling?  Home Care: [facility]    Phone number:  [phone]    Fax number:  NA     Name of caller: Bharati    INR Value:  3.2    Are there any other concerns:  No    Can we leave a detailed message on this number? YES - Bharati is at home waiting to set up meds    Phone number patient can be reached at:       Call taken on 12/27/2018 at 12:41 PM by Zo Cardenas

## 2018-12-27 NOTE — PATIENT INSTRUCTIONS
Today's Plan:   1) I will discuss with Dr. King about your heart monitor results and call you with update.       If you have questions or concerns please call clinic at [phone].    Please call [phone] for scheduling.       It was a pleasure seeing you today!     Reminder: Please bring in all current medications, over the counter supplements and vitamin bottles to your next appointment.    Melissa Allison  12/27/2018

## 2018-12-27 NOTE — PROGRESS NOTES
ANTICOAGULATION FOLLOW-UP CLINIC VISIT    Patient Name:  Kathryn Banks  Date:  12/27/2018  Contact Type:  Telephone/ B[facility] - homecare    SUBJECTIVE:     Patient Findings     Positives:   No Problem Findings    Comments:   Reason for Call:  INR     Who is calling?  Home Care: [facility]     Phone number:  [phone]     Fax number:  NA      Name of caller: Bharati     INR Value:  3.2     Are there any other concerns:  No     Can we leave a detailed message on this number? YES - Bharati is at home waiting to set up meds     Phone number patient can be reached at:         Call taken on 12/27/2018 at 12:41 PM by Zo Cardenas                OBJECTIVE    INR   Date Value Ref Range Status   12/27/2018 3.2  Final       ASSESSMENT / PLAN  INR assessment THER    Recheck INR In: 1 WEEK    INR Location [facility] INR      Anticoagulation Summary  As of 12/27/2018    INR goal:   2.0-3.0   TTR:   39.8 % (3.5 mo)   INR used for dosing:   3.2! (12/27/2018)   Warfarin maintenance plan:   5 mg (2.5 mg x 2) every Mon, Fri; 2.5 mg (2.5 mg x 1) all other days   Full warfarin instructions:   5 mg every Mon, Fri; 2.5 mg all other days   Weekly warfarin total:   22.5 mg   Plan last modified:   Gianni Palumbo RN (12/27/2018)   Next INR check:   1/3/2019   Target end date:            Anticoagulation Episode Summary     INR check location:       Preferred lab:       Send INR reminders to:   [facility] RANDI    Comments:   2.5 mg tabs      Anticoagulation Care Providers     Provider Role Specialty Phone number    Dheeraj Jj MD [facility] Internal Medicine [phone]            See the Encounter Report to view Anticoagulation Flowsheet and Dosing Calendar (Go to Encounters tab in chart review, and find the Anticoagulation Therapy Visit)    Dosage adjustment made based on physician directed care plan.    Gianni Palumbo RN

## 2018-12-27 NOTE — LETTER
12/27/2018    Dheeraj Jj MD  [address] [facility] [address]    RE: Kathryn WEEKS Jocelyn       Dear Colleague,    I had the pleasure of seeing Kathryn WEEKS Jocelyn in the [facility].    Primary Cardiologist: Dr. King    Reason for Visit: Discuss ZIOpatch monitor results    History of Present Illness:   This is a very pleasant 78-year-old female who presents to cardiology clinic today with her friend.  Her past medical history is notable for coronary artery disease, history of atrial flutter ablation (in 6/2017), paroxysmal atrial fibrillation, end-stage renal disease (dialysis on Monday Wednesday and Fridays), and chronic diastolic heart failure.    She was last seen by Dr. King in our clinic last month for atrial fibrillation follow-up.  At that time for unknown reasons patient was noted to be no longer on diltiazem and only metoprolol tartrate.  This was further increased to 25 mg twice daily as patient was felt to be mildly tachycardic and she was set up for 0 patch monitor for 2 weeks for reevaluation of her ventricular rates.  Unfortunately in the interim the patient was admitted to [facility] for worsening shortness of breath.  She was set up for dialysis for extra fluid removal.  She was discharged in stable condition.  It also appears that patient had been on amiodarone since October 2018 for her atrial fibrillation.  There is no detail explanation for this change.  Patient is not aware who added this medication to her regimen.  She currently denies any worsening shortness of breath, orthopnea, PND, peripheral edema, palpitations or any other symptoms.  She does not think that she is ever been symptomatic from her atrial fibrillation but later does report that she has had intermittent episodes of brief shortness of breath with associated palpitations.  She has been on warfarin for atrial fibrillation she denies any bleeding issues.    Assessment and Plan:  This is a  very pleasant 78-year-old female who presents to cardiology clinic today with her friend.  Her past medical history is notable for coronary artery disease, history of atrial flutter ablation (in 6/2017), paroxysmal atrial fibrillation, end-stage renal disease (dialysis on Monday Wednesday and Fridays), and chronic diastolic heart failure.    She appears to be in a euvolemic state today.  She is due for her next dialysis tomorrow.  She has followed up with her nephrologist 2 weeks ago and will be seeing them in the next month.    In regards to her atrial fibrillation she appears to be in normal sinus rhythm today.  Her 0 patch monitor unfortunately does show brief runs of atrial fibrillation/atrial flutter which includes a 7% burden.  I will further discuss her case with Dr. King as far as how we should proceed to manage her atrial fibrillation/flutter.  She verbalized understanding of this.    Thank you for allowing me to participate in the care of this pleasant patient today.    This note was completed in part using Dragon voice recognition software. Although reviewed after completion, some word and grammatical errors may occur.    Orders this Visit:  No orders of the defined types were placed in this encounter.    Orders Placed This Encounter   Medications     amiodarone (PACERONE/CODARONE) 200 MG tablet     Sig: Take 200 mg by mouth daily     There are no discontinued medications.      Encounter Diagnosis   Name Primary?     Paroxysmal atrial fibrillation (H)        CURRENT MEDICATIONS:  Current Outpatient Medications   Medication Sig Dispense Refill     ACETAMINOPHEN PO Take 1,000 mg by mouth 3 times daily And give 500 mg PO every 12 hours as needed       amiodarone (PACERONE/CODARONE) 200 MG tablet Take 200 mg by mouth daily       B Complex-C-Folic Acid (RENAL) 1 MG CAPS Take 1 capsule by mouth daily       calcium acetate (PHOSLO) 667 MG CAPS capsule Take 667 mg by mouth 3 times daily (with meals) And 2 times  daily with snacks       loperamide (IMODIUM) 2 MG capsule Take 1 capsule (2 mg) by mouth daily Every Monday, Wednesday and Friday prior to dialysis 40 capsule 1     metoprolol tartrate (LOPRESSOR) 25 MG tablet Take 2 tablets (50 mg) by mouth 2 times daily 180 tablet 1     NEPHROCAPS (TRIPHROCAPS) 1 MG capsule Take 1 capsule by mouth daily 90 capsule 3     nitroGLYcerin (NITROSTAT) 0.4 MG sublingual tablet Place 1 tablet (0.4 mg) under the tongue every 5 minutes as needed for chest pain 25 tablet 0     pentoxifylline (TRENTAL) 400 MG CR tablet Take 1 tablet (400 mg) by mouth daily 90 tablet 0     pramipexole (MIRAPEX) 0.125 MG tablet Take 2 tablets (0.25 mg) by mouth At Bedtime 90 tablet 3     pravastatin (PRAVACHOL) 40 MG tablet Take 1 tablet (40 mg) by mouth daily 90 tablet 3     senna-docusate (SENOKOT-S;PERICOLACE) 8.6-50 MG per tablet Take 2 tablets by mouth 2 times daily as needed for constipation       warfarin (COUMADIN) 2.5 MG tablet Take 5 mg Mon, Fri and 2.5 mg all other days or as directed by the coumadin clinic. 40 tablet 1       ALLERGIES     Allergies   Allergen Reactions     Ciprofloxacin Nausea and Vomiting     Zofran did not help     Oxycodone Visual Disturbance     Delusions, blackouts      Lisinopril Cough     Milk Digestant [Lactase]      Stomach up set when drinks milk      Sulfa Drugs GI Disturbance     LOSS OF TASTE     Tape [Adhesive Tape]      Penicillins Other (See Comments)     Swelling and reddness at injection site, pt wanted it on        PAST MEDICAL HISTORY:  Past Medical History:   Diagnosis Date     Carpal tunnel syndrome      Coronary atherosclerosis of native coronary artery 2006    5 vessel CABG     OBSTRUCTIVE SLEEP APNEA     using CPAP     Osteoarthrosis, unspecified whether generalized or localized, unspecified site     generalized arthritis, particularly in her hands and feet         Other and combined forms of senile cataract 2000    Bilateral     Other and unspecified  hyperlipidemia      Paroxysmal atrial fibrillation (H)      Renal failure     on hemodialysis     RESTLESS LEGS SYNDROME      TENOSYNOV HAND/WRIST NEC 6/30/2006     Type II or unspecified type diabetes mellitus without mention of complication, not stated as uncontrolled 2001    Diabetes mellitus/pt is diet controlled with weight loss     Typical atrial flutter (H) 01/17/2007    ablation 6/7/2017     Unspecified essential hypertension        PAST SURGICAL HISTORY:  Past Surgical History:   Procedure Laterality Date     ANGIOPLASTY       C APPENDECTOMY       C CABG, VEIN, FIVE  12/06    SVG x 4 and LIMA to LAD     C SOTO W/O FACETEC FORAMOT/DSKC 1/2 VRT SEG, LUMBAR  1968     C REMV CATARACT INTRACAP,INSERT LENS      Bilateral     C TOTAL ABDOM HYSTERECTOMY  1995     - fibroids     C VAGOTOMY/PYLOROPLASTY,SELECT  1970     COLONOSCOPY  12/3/2007     COLONOSCOPY  1/17/2014    Procedure: COLONOSCOPY;  Colonoscopy;  Surgeon: Zack Stearns MD;  Location: [facility]     CREATE GRAFT LOOP ARTERIOVENOUS UPPER EXTREMITY Left 1/9/2018    Procedure: CREATE GRAFT ARTERIOVENOUS UPPER EXTREMITY;  Left Upper Arm Arteriovenous Graft Creation, Anesthesia Block ;  Surgeon: Cassie Cardenas MD;  [facility]     [facility]  11/25/09    [facility]     ENDOSCOPY  03/21/2000    Upper GI     HC COLONOSCOPY THRU STOMA, DIAGNOSTIC  10/7/04    poor prep, repeat in 2-3 years     HC COLONOSCOPY W/WO BRUSH/WASH  10/31/07    Repeat-poor quality prep     HC REMOVAL OF OVARY/TUBE(S)      Salpingo-Oophorectomy, Unilateral     HC REVISE MEDIAN N/CARPAL TUNNEL SURG      Carpal tunnel release     HC UGI ENDOSCOPY DIAG W BIOPSY  10/31/07     HC UGI ENDOSCOPY, SIMPLE EXAM  12/3/2007     LAPAROTOMY, LYSIS ADHESIONS, COMBINED N/A 10/24/2017    Procedure: COMBINED LAPAROTOMY, LYSIS ADHESIONS;  REPAIR FOCAL ILEAL SMALL BOWEL PERFERATION, LYSIS OF ADHESIONS.;  Surgeon: Rashard Zafar MD;  Location: [facility]     OPEN REDUCTION INTERNAL FIXATION HIP NAILING  Left 7/3/2016    Procedure: OPEN REDUCTION INTERNAL FIXATION HIP NAILING;  Surgeon: Lake Schulz MD;  Location: [facility]       FAMILY HISTORY:  Family History   Problem Relation Age of Onset     Diabetes Father         AODM     Neurologic Disorder Father         Parkinson's     Blood Disease Father          from blood clot from leg to lung immediately after hip fracture     Diabetes Paternal Grandmother         adult onset     Diabetes Maternal Grandmother         adult onset     Hypertension No family hx of      Cerebrovascular Disease No family hx of        SOCIAL HISTORY:  Social History     Socioeconomic History     Marital status:      Spouse name: Urbano Banks     Number of children: 2     Years of education: 13     Highest education level: Not on file   Social Needs     Financial resource strain: Not on file     Food insecurity - worry: Not on file     Food insecurity - inability: Not on file     Transportation needs - medical: Not on file     Transportation needs - non-medical: Not on file   Occupational History     Occupation: Ministry coordinator     Comment: [employer]   Tobacco Use     Smoking status: Former Smoker     Years: 10.00     Last attempt to quit: 1969     Years since quittin.0     Smokeless tobacco: Never Used   Substance and Sexual Activity     Alcohol use: Yes     Alcohol/week: 0.0 oz     Comment:  a beer once a month     Drug use: No     Sexual activity: Yes     Birth control/protection: Surgical   Other Topics Concern     Parent/sibling w/ CABG, MI or angioplasty before 65F 55M? No   Social History Narrative     Not on file       Review of Systems:  Skin:  Negative     Eyes:  Positive for glasses  ENT:  Positive for vertigo  Respiratory:  Positive for shortness of breath;dyspnea on exertion  Cardiovascular:  Negative for;palpitations;chest pain;lightheadedness;dizziness edema  Gastroenterology: Negative    Genitourinary:  Negative   "  Musculoskeletal:  Negative    Neurologic:  Positive for numbness or tingling of feet  Psychiatric:  Positive for sleep disturbances  Heme/Lymph/Imm:  Positive for allergies  Endocrine:  Positive for diabetes    Physical Exam:  Vitals: /70 (BP Location: Right arm, Patient Position: Fowlers, Cuff Size: Adult Regular)   Pulse 66   Ht 1.626 m (5' 4\")   Wt 65 kg (143 lb 3.2 oz)   SpO2 93%   BMI 24.58 kg/m        GEN:  NAD. In wheelchair. Oxygen per room air.  NECK: No JVD  C/V:  Regular rate and rhythm, no murmur, rub or gallop.  RESP: Clear to auscultation bilaterally without wheezing, rales, or rhonchi.  GI: Abdomen soft, nontender, nondistended.   EXTREM: Trace LE edema.   NEURO: Alert and oriented, cooperative. No obvious focal deficits.   PSYCH: Normal affect.  SKIN: Warm and dry.       Recent Lab Results:  LIPID RESULTS:  Lab Results   Component Value Date    CHOL 95 04/27/2018    HDL 58 04/27/2018    LDL 18 04/27/2018    TRIG 96 04/27/2018    CHOLHDLRATIO 1.8 06/08/2015       LIVER ENZYME RESULTS:  Lab Results   Component Value Date    AST 24 10/01/2018    ALT 20 10/01/2018       CBC RESULTS:  Lab Results   Component Value Date    WBC 10.8 10/01/2018    RBC 4.14 10/01/2018    HGB 11.5 (L) 10/01/2018    HCT 37.2 10/01/2018    MCV 90 10/01/2018    MCH 27.8 10/01/2018    MCHC 30.9 (L) 10/01/2018    RDW 18.0 (H) 10/01/2018     10/01/2018       BMP RESULTS:  Lab Results   Component Value Date     10/01/2018    POTASSIUM 4.3 10/01/2018    CHLORIDE 104 10/01/2018    CO2 33 (H) 10/01/2018    ANIONGAP 4 10/01/2018     (H) 10/01/2018    BUN 13 10/01/2018    CR 1.75 (H) 10/01/2018    GFRESTIMATED 28 (L) 10/01/2018    GFRESTBLACK 34 (L) 10/01/2018    MALICK 7.0 (L) 10/01/2018        A1C RESULTS:  Lab Results   Component Value Date    A1C 5.1 04/27/2018       INR RESULTS:  Lab Results   Component Value Date    INR 1.3 12/21/2018    INR 1.2 12/18/2018                 Thank you for allowing me to " participate in the care of your patient.    Sincerely,     Melissa Allison PA-C     [facility]

## 2018-12-27 NOTE — LETTER
12/27/2018    Dheeraj Jj MD  [address] [facility] [address]    RE: Kathryn WEEKS Jocelyn       Dear Colleague,    I had the pleasure of seeing Kathryn WEEKS Jocelyn in the [facility].    Primary Cardiologist: Dr. King    Reason for Visit: Discuss ZIOpatch monitor results    History of Present Illness:   This is a very pleasant 78-year-old female who presents to cardiology clinic today with her friend.  Her past medical history is notable for coronary artery disease, history of atrial flutter ablation (in 6/2017), paroxysmal atrial fibrillation, end-stage renal disease (dialysis on Monday Wednesday and Fridays), and chronic diastolic heart failure.    She was last seen by Dr. King in our clinic last month for atrial fibrillation follow-up.  At that time for unknown reasons patient was noted to be no longer on diltiazem and only metoprolol tartrate.  This was further increased to 25 mg twice daily as patient was felt to be mildly tachycardic and she was set up for 0 patch monitor for 2 weeks for reevaluation of her ventricular rates.  Unfortunately in the interim the patient was admitted to [facility] for worsening shortness of breath.  She was set up for dialysis for extra fluid removal.  She was discharged in stable condition.  It also appears that patient had been on amiodarone since October 2018 for her atrial fibrillation.  There is no detail explanation for this change.  Patient is not aware who added this medication to her regimen.  She currently denies any worsening shortness of breath, orthopnea, PND, peripheral edema, palpitations or any other symptoms.  She does not think that she is ever been symptomatic from her atrial fibrillation but later does report that she has had intermittent episodes of brief shortness of breath with associated palpitations.  She has been on warfarin for atrial fibrillation she denies any bleeding issues.    Assessment and Plan:  This is a  very pleasant 78-year-old female who presents to cardiology clinic today with her friend.  Her past medical history is notable for coronary artery disease, history of atrial flutter ablation (in 6/2017), paroxysmal atrial fibrillation, end-stage renal disease (dialysis on Monday Wednesday and Fridays), and chronic diastolic heart failure.    She appears to be in a euvolemic state today.  She is due for her next dialysis tomorrow.  She has followed up with her nephrologist 2 weeks ago and will be seeing them in the next month.    In regards to her atrial fibrillation she appears to be in normal sinus rhythm today.  Her 0 patch monitor unfortunately does show brief runs of atrial fibrillation/atrial flutter which includes a 7% burden.  I will further discuss her case with Dr. King as far as how we should proceed to manage her atrial fibrillation/flutter.  She verbalized understanding of this.    Thank you for allowing me to participate in the care of this pleasant patient today.    This note was completed in part using Dragon voice recognition software. Although reviewed after completion, some word and grammatical errors may occur.    Orders this Visit:  No orders of the defined types were placed in this encounter.    Orders Placed This Encounter   Medications     amiodarone (PACERONE/CODARONE) 200 MG tablet     Sig: Take 200 mg by mouth daily     There are no discontinued medications.      Encounter Diagnosis   Name Primary?     Paroxysmal atrial fibrillation (H)        CURRENT MEDICATIONS:  Current Outpatient Medications   Medication Sig Dispense Refill     ACETAMINOPHEN PO Take 1,000 mg by mouth 3 times daily And give 500 mg PO every 12 hours as needed       amiodarone (PACERONE/CODARONE) 200 MG tablet Take 200 mg by mouth daily       B Complex-C-Folic Acid (RENAL) 1 MG CAPS Take 1 capsule by mouth daily       calcium acetate (PHOSLO) 667 MG CAPS capsule Take 667 mg by mouth 3 times daily (with meals) And 2 times  daily with snacks       loperamide (IMODIUM) 2 MG capsule Take 1 capsule (2 mg) by mouth daily Every Monday, Wednesday and Friday prior to dialysis 40 capsule 1     metoprolol tartrate (LOPRESSOR) 25 MG tablet Take 2 tablets (50 mg) by mouth 2 times daily 180 tablet 1     NEPHROCAPS (TRIPHROCAPS) 1 MG capsule Take 1 capsule by mouth daily 90 capsule 3     nitroGLYcerin (NITROSTAT) 0.4 MG sublingual tablet Place 1 tablet (0.4 mg) under the tongue every 5 minutes as needed for chest pain 25 tablet 0     pentoxifylline (TRENTAL) 400 MG CR tablet Take 1 tablet (400 mg) by mouth daily 90 tablet 0     pramipexole (MIRAPEX) 0.125 MG tablet Take 2 tablets (0.25 mg) by mouth At Bedtime 90 tablet 3     pravastatin (PRAVACHOL) 40 MG tablet Take 1 tablet (40 mg) by mouth daily 90 tablet 3     senna-docusate (SENOKOT-S;PERICOLACE) 8.6-50 MG per tablet Take 2 tablets by mouth 2 times daily as needed for constipation       warfarin (COUMADIN) 2.5 MG tablet Take 5 mg Mon, Fri and 2.5 mg all other days or as directed by the coumadin clinic. 40 tablet 1       ALLERGIES     Allergies   Allergen Reactions     Ciprofloxacin Nausea and Vomiting     Zofran did not help     Oxycodone Visual Disturbance     Delusions, blackouts      Lisinopril Cough     Milk Digestant [Lactase]      Stomach up set when drinks milk      Sulfa Drugs GI Disturbance     LOSS OF TASTE     Tape [Adhesive Tape]      Penicillins Other (See Comments)     Swelling and reddness at injection site, pt wanted it on        PAST MEDICAL HISTORY:  Past Medical History:   Diagnosis Date     Carpal tunnel syndrome      Coronary atherosclerosis of native coronary artery 2006    5 vessel CABG     OBSTRUCTIVE SLEEP APNEA     using CPAP     Osteoarthrosis, unspecified whether generalized or localized, unspecified site     generalized arthritis, particularly in her hands and feet         Other and combined forms of senile cataract 2000    Bilateral     Other and unspecified  hyperlipidemia      Paroxysmal atrial fibrillation (H)      Renal failure     on hemodialysis     RESTLESS LEGS SYNDROME      TENOSYNOV HAND/WRIST NEC 6/30/2006     Type II or unspecified type diabetes mellitus without mention of complication, not stated as uncontrolled 2001    Diabetes mellitus/pt is diet controlled with weight loss     Typical atrial flutter (H) 01/17/2007    ablation 6/7/2017     Unspecified essential hypertension        PAST SURGICAL HISTORY:  Past Surgical History:   Procedure Laterality Date     ANGIOPLASTY       C APPENDECTOMY       C CABG, VEIN, FIVE  12/06    SVG x 4 and LIMA to LAD     C SOTO W/O FACETEC FORAMOT/DSKC 1/2 VRT SEG, LUMBAR  1968     C REMV CATARACT INTRACAP,INSERT LENS      Bilateral     C TOTAL ABDOM HYSTERECTOMY  1995     - fibroids     C VAGOTOMY/PYLOROPLASTY,SELECT  1970     COLONOSCOPY  12/3/2007     COLONOSCOPY  1/17/2014    Procedure: COLONOSCOPY;  Colonoscopy;  Surgeon: Zack Stearns MD;  Location: [facility]     CREATE GRAFT LOOP ARTERIOVENOUS UPPER EXTREMITY Left 1/9/2018    Procedure: CREATE GRAFT ARTERIOVENOUS UPPER EXTREMITY;  Left Upper Arm Arteriovenous Graft Creation, Anesthesia Block ;  Surgeon: Cassie Cardenas MD;  [facility]     [facility]COPY  11/25/09    [facility]     ENDOSCOPY  03/21/2000    [facility]     HC COLONOSCOPY THRU STOMA, DIAGNOSTIC  10/7/04    poor prep, repeat in 2-3 years     HC COLONOSCOPY W/WO BRUSH/WASH  10/31/07    Repeat-poor quality prep     HC REMOVAL OF OVARY/TUBE(S)      Salpingo-Oophorectomy, Unilateral     HC REVISE MEDIAN N/CARPAL TUNNEL SURG      Carpal tunnel release     HC UGI ENDOSCOPY DIAG W BIOPSY  10/31/07     HC UGI ENDOSCOPY, SIMPLE EXAM  12/3/2007     LAPAROTOMY, LYSIS ADHESIONS, COMBINED N/A 10/24/2017    Procedure: COMBINED LAPAROTOMY, LYSIS ADHESIONS;  REPAIR FOCAL ILEAL SMALL BOWEL PERFERATION, LYSIS OF ADHESIONS.;  Surgeon: Rashard Zafar MD;  Location: [facility]     OPEN REDUCTION INTERNAL FIXATION HIP NAILING  Left 7/3/2016    Procedure: OPEN REDUCTION INTERNAL FIXATION HIP NAILING;  Surgeon: Lake Schulz MD;  Location: [facility]       FAMILY HISTORY:  Family History   Problem Relation Age of Onset     Diabetes Father         AODM     Neurologic Disorder Father         Parkinson's     Blood Disease Father          from blood clot from leg to lung immediately after hip fracture     Diabetes Paternal Grandmother         adult onset     Diabetes Maternal Grandmother         adult onset     Hypertension No family hx of      Cerebrovascular Disease No family hx of        SOCIAL HISTORY:  Social History     Socioeconomic History     Marital status:      Spouse name: Urbano Banks     Number of children: 2     Years of education: 13     Highest education level: Not on file   Social Needs     Financial resource strain: Not on file     Food insecurity - worry: Not on file     Food insecurity - inability: Not on file     Transportation needs - medical: Not on file     Transportation needs - non-medical: Not on file   Occupational History     Occupation: Ministry coordinator     Comment: [employer]   Tobacco Use     Smoking status: Former Smoker     Years: 10.00     Last attempt to quit: 1969     Years since quittin.0     Smokeless tobacco: Never Used   Substance and Sexual Activity     Alcohol use: Yes     Alcohol/week: 0.0 oz     Comment:  a beer once a month     Drug use: No     Sexual activity: Yes     Birth control/protection: Surgical   Other Topics Concern     Parent/sibling w/ CABG, MI or angioplasty before 65F 55M? No   Social History Narrative     Not on file       Review of Systems:  Skin:  Negative     Eyes:  Positive for glasses  ENT:  Positive for vertigo  Respiratory:  Positive for shortness of breath;dyspnea on exertion  Cardiovascular:  Negative for;palpitations;chest pain;lightheadedness;dizziness edema  Gastroenterology: Negative    Genitourinary:  Negative   "  Musculoskeletal:  Negative    Neurologic:  Positive for numbness or tingling of feet  Psychiatric:  Positive for sleep disturbances  Heme/Lymph/Imm:  Positive for allergies  Endocrine:  Positive for diabetes    Physical Exam:  Vitals: /70 (BP Location: Right arm, Patient Position: Fowlers, Cuff Size: Adult Regular)   Pulse 66   Ht 1.626 m (5' 4\")   Wt 65 kg (143 lb 3.2 oz)   SpO2 93%   BMI 24.58 kg/m        GEN:  NAD. In wheelchair. Oxygen per room air.  NECK: No JVD  C/V:  Regular rate and rhythm, no murmur, rub or gallop.  RESP: Clear to auscultation bilaterally without wheezing, rales, or rhonchi.  GI: Abdomen soft, nontender, nondistended.   EXTREM: Trace LE edema.   NEURO: Alert and oriented, cooperative. No obvious focal deficits.   PSYCH: Normal affect.  SKIN: Warm and dry.       Recent Lab Results:  LIPID RESULTS:  Lab Results   Component Value Date    CHOL 95 04/27/2018    HDL 58 04/27/2018    LDL 18 04/27/2018    TRIG 96 04/27/2018    CHOLHDLRATIO 1.8 06/08/2015       LIVER ENZYME RESULTS:  Lab Results   Component Value Date    AST 24 10/01/2018    ALT 20 10/01/2018       CBC RESULTS:  Lab Results   Component Value Date    WBC 10.8 10/01/2018    RBC 4.14 10/01/2018    HGB 11.5 (L) 10/01/2018    HCT 37.2 10/01/2018    MCV 90 10/01/2018    MCH 27.8 10/01/2018    MCHC 30.9 (L) 10/01/2018    RDW 18.0 (H) 10/01/2018     10/01/2018       BMP RESULTS:  Lab Results   Component Value Date     10/01/2018    POTASSIUM 4.3 10/01/2018    CHLORIDE 104 10/01/2018    CO2 33 (H) 10/01/2018    ANIONGAP 4 10/01/2018     (H) 10/01/2018    BUN 13 10/01/2018    CR 1.75 (H) 10/01/2018    GFRESTIMATED 28 (L) 10/01/2018    GFRESTBLACK 34 (L) 10/01/2018    MALICK 7.0 (L) 10/01/2018        A1C RESULTS:  Lab Results   Component Value Date    A1C 5.1 04/27/2018       INR RESULTS:  Lab Results   Component Value Date    INR 1.3 12/21/2018    INR 1.2 12/18/2018           Melissa Allison PA-C   December " 27, 2018       Thank you for allowing me to participate in the care of your patient.      Sincerely,     Melissa Allison PA-C     [facility]    cc:   Thaddeus King MD  [address]  [address]  [place] [id number]

## 2018-12-27 NOTE — PROGRESS NOTES
Primary Cardiologist: Dr. King    Reason for Visit: Discuss ZIOpatch monitor results    History of Present Illness:   This is a very pleasant 78-year-old female who presents to cardiology clinic today with her friend.  Her past medical history is notable for coronary artery disease, history of atrial flutter ablation (in 6/2017), paroxysmal atrial fibrillation, end-stage renal disease (dialysis on Monday Wednesday and Fridays), and chronic diastolic heart failure.    She was last seen by Dr. King in our clinic last month for atrial fibrillation follow-up.  At that time for unknown reasons patient was noted to be no longer on diltiazem and only metoprolol tartrate.  This was further increased to 25 mg twice daily as patient was felt to be mildly tachycardic and she was set up for 0 patch monitor for 2 weeks for reevaluation of her ventricular rates.  Unfortunately in the interim the patient was admitted to [facility] for worsening shortness of breath.  She was set up for dialysis for extra fluid removal.  She was discharged in stable condition.  It also appears that patient had been on amiodarone since October 2018 for her atrial fibrillation.  There is no detail explanation for this change.  Patient is not aware who added this medication to her regimen.  She currently denies any worsening shortness of breath, orthopnea, PND, peripheral edema, palpitations or any other symptoms.  She does not think that she is ever been symptomatic from her atrial fibrillation but later does report that she has had intermittent episodes of brief shortness of breath with associated palpitations.  She has been on warfarin for atrial fibrillation she denies any bleeding issues.    Assessment and Plan:  This is a very pleasant 78-year-old female who presents to cardiology clinic today with her friend.  Her past medical history is notable for coronary artery disease, history of atrial flutter ablation (in 6/2017), paroxysmal atrial  fibrillation, end-stage renal disease (dialysis on Monday Wednesday and Fridays), and chronic diastolic heart failure.    She appears to be in a euvolemic state today.  She is due for her next dialysis tomorrow.  She has followed up with her nephrologist 2 weeks ago and will be seeing them in the next month.    In regards to her atrial fibrillation she appears to be in normal sinus rhythm today.  Her 0 patch monitor unfortunately does show brief runs of atrial fibrillation/atrial flutter which includes a 7% burden.  I will further discuss her case with Dr. King as far as how we should proceed to manage her atrial fibrillation/flutter.  She verbalized understanding of this.    Thank you for allowing me to participate in the care of this pleasant patient today.    This note was completed in part using Dragon voice recognition software. Although reviewed after completion, some word and grammatical errors may occur.    Orders this Visit:  No orders of the defined types were placed in this encounter.    Orders Placed This Encounter   Medications     amiodarone (PACERONE/CODARONE) 200 MG tablet     Sig: Take 200 mg by mouth daily     There are no discontinued medications.      Encounter Diagnosis   Name Primary?     Paroxysmal atrial fibrillation (H)        CURRENT MEDICATIONS:  Current Outpatient Medications   Medication Sig Dispense Refill     ACETAMINOPHEN PO Take 1,000 mg by mouth 3 times daily And give 500 mg PO every 12 hours as needed       amiodarone (PACERONE/CODARONE) 200 MG tablet Take 200 mg by mouth daily       B Complex-C-Folic Acid (RENAL) 1 MG CAPS Take 1 capsule by mouth daily       calcium acetate (PHOSLO) 667 MG CAPS capsule Take 667 mg by mouth 3 times daily (with meals) And 2 times daily with snacks       loperamide (IMODIUM) 2 MG capsule Take 1 capsule (2 mg) by mouth daily Every Monday, Wednesday and Friday prior to dialysis 40 capsule 1     metoprolol tartrate (LOPRESSOR) 25 MG tablet Take 2 tablets  (50 mg) by mouth 2 times daily 180 tablet 1     NEPHROCAPS (TRIPHROCAPS) 1 MG capsule Take 1 capsule by mouth daily 90 capsule 3     nitroGLYcerin (NITROSTAT) 0.4 MG sublingual tablet Place 1 tablet (0.4 mg) under the tongue every 5 minutes as needed for chest pain 25 tablet 0     pentoxifylline (TRENTAL) 400 MG CR tablet Take 1 tablet (400 mg) by mouth daily 90 tablet 0     pramipexole (MIRAPEX) 0.125 MG tablet Take 2 tablets (0.25 mg) by mouth At Bedtime 90 tablet 3     pravastatin (PRAVACHOL) 40 MG tablet Take 1 tablet (40 mg) by mouth daily 90 tablet 3     senna-docusate (SENOKOT-S;PERICOLACE) 8.6-50 MG per tablet Take 2 tablets by mouth 2 times daily as needed for constipation       warfarin (COUMADIN) 2.5 MG tablet Take 5 mg Mon, Fri and 2.5 mg all other days or as directed by the coumadin clinic. 40 tablet 1       ALLERGIES     Allergies   Allergen Reactions     Ciprofloxacin Nausea and Vomiting     Zofran did not help     Oxycodone Visual Disturbance     Delusions, blackouts      Lisinopril Cough     Milk Digestant [Lactase]      Stomach up set when drinks milk      Sulfa Drugs GI Disturbance     LOSS OF TASTE     Tape [Adhesive Tape]      Penicillins Other (See Comments)     Swelling and reddness at injection site, pt wanted it on        PAST MEDICAL HISTORY:  Past Medical History:   Diagnosis Date     Carpal tunnel syndrome      Coronary atherosclerosis of native coronary artery 2006    5 vessel CABG     OBSTRUCTIVE SLEEP APNEA     using CPAP     Osteoarthrosis, unspecified whether generalized or localized, unspecified site     generalized arthritis, particularly in her hands and feet         Other and combined forms of senile cataract 2000    Bilateral     Other and unspecified hyperlipidemia      Paroxysmal atrial fibrillation (H)      Renal failure     on hemodialysis     RESTLESS LEGS SYNDROME      TENOSYNOV HAND/WRIST NEC 6/30/2006     Type II or unspecified type diabetes mellitus without mention of  complication, not stated as uncontrolled 2001    Diabetes mellitus/pt is diet controlled with weight loss     Typical atrial flutter (H) 01/17/2007    ablation 6/7/2017     Unspecified essential hypertension        PAST SURGICAL HISTORY:  Past Surgical History:   Procedure Laterality Date     ANGIOPLASTY       C APPENDECTOMY       C CABG, VEIN, FIVE  12/06    SVG x 4 and LIMA to LAD     C SOTO W/O FACETEC FORAMOT/DSKC 1/2 VRT SEG, LUMBAR  1968     C REMV CATARACT INTRACAP,INSERT LENS      Bilateral     C TOTAL ABDOM HYSTERECTOMY  1995     - fibroids     C VAGOTOMY/PYLOROPLASTY,SELECT  1970     COLONOSCOPY  12/3/2007     COLONOSCOPY  1/17/2014    Procedure: COLONOSCOPY;  Colonoscopy;  Surgeon: Zack Stearns MD;  Location: [facility]     CREATE GRAFT LOOP ARTERIOVENOUS UPPER EXTREMITY Left 1/9/2018    Procedure: CREATE GRAFT ARTERIOVENOUS UPPER EXTREMITY;  Left Upper Arm Arteriovenous Graft Creation, Anesthesia Block ;  Surgeon: Cassie Cardenas MD;  Location: [facility]     CYSTOSCOPY  11/25/09    [facility]     ENDOSCOPY  03/21/2000    [facility] GI     HC COLONOSCOPY THRU STOMA, DIAGNOSTIC  10/7/04    poor prep, repeat in 2-3 years     HC COLONOSCOPY W/WO BRUSH/WASH  10/31/07    Repeat-poor quality prep     HC REMOVAL OF OVARY/TUBE(S)      Salpingo-Oophorectomy, Unilateral     HC REVISE MEDIAN N/CARPAL TUNNEL SURG      Carpal tunnel release     HC UGI ENDOSCOPY DIAG W BIOPSY  10/31/07     HC UGI ENDOSCOPY, SIMPLE EXAM  12/3/2007     LAPAROTOMY, LYSIS ADHESIONS, COMBINED N/A 10/24/2017    Procedure: COMBINED LAPAROTOMY, LYSIS ADHESIONS;  REPAIR FOCAL ILEAL SMALL BOWEL PERFERATION, LYSIS OF ADHESIONS.;  Surgeon: Rashard Zafar MD;  Location:  OR     OPEN REDUCTION INTERNAL FIXATION HIP NAILING Left 7/3/2016    Procedure: OPEN REDUCTION INTERNAL FIXATION HIP NAILING;  Surgeon: Lake Schulz MD;  Location:  OR       FAMILY HISTORY:  Family History   Problem Relation Age of Onset     Diabetes Father         AODM      Neurologic Disorder Father         Parkinson's     Blood Disease Father          from blood clot from leg to lung immediately after hip fracture     Diabetes Paternal Grandmother         adult onset     Diabetes Maternal Grandmother         adult onset     Hypertension No family hx of      Cerebrovascular Disease No family hx of        SOCIAL HISTORY:  Social History     Socioeconomic History     Marital status:      Spouse name: Urbano Banks     Number of children: 2     Years of education: 13     Highest education level: Not on file   Social Needs     Financial resource strain: Not on file     Food insecurity - worry: Not on file     Food insecurity - inability: Not on file     Transportation needs - medical: Not on file     Transportation needs - non-medical: Not on file   Occupational History     Occupation: Ministry coordinator     Comment: [employer]   Tobacco Use     Smoking status: Former Smoker     Years: 10.00     Last attempt to quit: 1969     Years since quittin.0     Smokeless tobacco: Never Used   Substance and Sexual Activity     Alcohol use: Yes     Alcohol/week: 0.0 oz     Comment:  a beer once a month     Drug use: No     Sexual activity: Yes     Birth control/protection: Surgical   Other Topics Concern     Parent/sibling w/ CABG, MI or angioplasty before 65F 55M? No   Social History Narrative     Not on file       Review of Systems:  Skin:  Negative     Eyes:  Positive for glasses  ENT:  Positive for vertigo  Respiratory:  Positive for shortness of breath;dyspnea on exertion  Cardiovascular:  Negative for;palpitations;chest pain;lightheadedness;dizziness edema  Gastroenterology: Negative    Genitourinary:  Negative    Musculoskeletal:  Negative    Neurologic:  Positive for numbness or tingling of feet  Psychiatric:  Positive for sleep disturbances  Heme/Lymph/Imm:  Positive for allergies  Endocrine:  Positive for diabetes    Physical  "Exam:  Vitals: /70 (BP Location: Right arm, Patient Position: Fowlers, Cuff Size: Adult Regular)   Pulse 66   Ht 1.626 m (5' 4\")   Wt 65 kg (143 lb 3.2 oz)   SpO2 93%   BMI 24.58 kg/m       GEN:  NAD. In wheelchair. Oxygen per room air.  NECK: No JVD  C/V:  Regular rate and rhythm, no murmur, rub or gallop.  RESP: Clear to auscultation bilaterally without wheezing, rales, or rhonchi.  GI: Abdomen soft, nontender, nondistended.   EXTREM: Trace LE edema.   NEURO: Alert and oriented, cooperative. No obvious focal deficits.   PSYCH: Normal affect.  SKIN: Warm and dry.       Recent Lab Results:  LIPID RESULTS:  Lab Results   Component Value Date    CHOL 95 04/27/2018    HDL 58 04/27/2018    LDL 18 04/27/2018    TRIG 96 04/27/2018    CHOLHDLRATIO 1.8 06/08/2015       LIVER ENZYME RESULTS:  Lab Results   Component Value Date    AST 24 10/01/2018    ALT 20 10/01/2018       CBC RESULTS:  Lab Results   Component Value Date    WBC 10.8 10/01/2018    RBC 4.14 10/01/2018    HGB 11.5 (L) 10/01/2018    HCT 37.2 10/01/2018    MCV 90 10/01/2018    MCH 27.8 10/01/2018    MCHC 30.9 (L) 10/01/2018    RDW 18.0 (H) 10/01/2018     10/01/2018       BMP RESULTS:  Lab Results   Component Value Date     10/01/2018    POTASSIUM 4.3 10/01/2018    CHLORIDE 104 10/01/2018    CO2 33 (H) 10/01/2018    ANIONGAP 4 10/01/2018     (H) 10/01/2018    BUN 13 10/01/2018    CR 1.75 (H) 10/01/2018    GFRESTIMATED 28 (L) 10/01/2018    GFRESTBLACK 34 (L) 10/01/2018    MALICK 7.0 (L) 10/01/2018        A1C RESULTS:  Lab Results   Component Value Date    A1C 5.1 04/27/2018       INR RESULTS:  Lab Results   Component Value Date    INR 1.3 12/21/2018    INR 1.2 12/18/2018           Melissa Allison PA-C   December 27, 2018     "

## 2018-12-28 NOTE — PROGRESS NOTES
[facility] now requests orders and shares plan of care/discharge summaries for some patients through [employer].  Please REPLY TO THIS MESSAGE OR ROUTE BACK TO THE AUTHOR in order to give authorization for orders when needed.  This is considered a verbal order, you will still receive a faxed copy of orders for signature.  Thank you for your assistance in improving collaboration for our patients.    ORDER  Skilled Nurse 1 wk 1, 2 wk 1, 3 PRN    MD SUMMARY/PLAN OF CARE  RECERTIFICATION OF CARE 12/27/18   S Pt is a 78 year old female being recertified for home care services. In the past 60 days patient has been hospitalized once with most recent hospitalization at [facility] from 11/18 to 11/20/18 for SOB and CHF. Patient continues to have issues with AFIB as well. She has a hx of noncompliance with medications thus leading to repeated hospitalizations. Patient was started on amiodarone in last cert period and thus has helped so far. Pt is a/o x 3, very pleasant, and cooperative. VS P 66, R 18, /70, LS CTA, O2 93 percent RA.  fasting. Weight 143 lbs. Denies pain at this visit. Right shin ulcer, from venous statis and noncompliance with ACE wraps. Educated on infection prevention and wound care. Education on safety measures. Educated on daily weights and to notify MD/RN of 2 lb weight gain overnight and 5 lb in a week.  Patient needing assistance with ADLs/IADLs. Has refused HHA stating she is able to do so on own however is not very safe. Patient does have friends checking in on patient often at least 3-4 times a week then has another friend that assist with taking patient to dialysis monday, wednesday and friday.  B Medical hx includes PAROXYSMAL ATRIAL FIBRILLATION WITH RVR, CHF, PLEURAL EFFUSION, ESRD ON DIALYSIS, CAD, OSTEOARTHROSIS, RLS, DM2, HTN, HX PNEUMONIA, LE WOUND TRAUMA, INCONTINENCE, EDEMA, DYSPNEA, HLDL, NON COMPLIANCE WITH MEDICATIONS.  A Pt is at risk for hospitalizations r/t  complex medical hx, weakness, age, non compliance, and will require skilled intervention to manage her health at home.   R SNV for CP, safety, INRs, VS, medication, chronic disease management, education. Pt requiring homecare r/t recurrent hospitalizations for fluid overload, afib with rvr, med non compliance.  Pt declines PT/OT and HHA  For RCT only, Estimated Length of Home Care Need 5 weeks to get met goals and less hospitalizations

## 2018-12-31 NOTE — TELEPHONE ENCOUNTER
Or Notes recorded by Thaddeus King MD on 12/31/2018 at/ 4:26 PM CST  Pt has rapid AF on amiodarone. Ok to stop amiodarone. Add Cardizem  mg daily for rate control. Repeat Ziopatch (2 weeks) one week after addition of Cardizem.    Called pt and made her aware of Dr King recommendation to stop the Amiodarone d/t to still having rapid AF and thus not working.  Pt will start taking Diltiazem on Wednesday 1/2 and then will have ZP placed on 1/9 or 1/10. Order for ZP to be placed and told pt the scheduling should call pt, but if they do not to please call scheduling. Pt states understanding. Canelo

## 2019-01-01 ENCOUNTER — NURSING HOME VISIT (OUTPATIENT)
Dept: GERIATRICS | Facility: CLINIC | Age: 79
End: 2019-01-01
Payer: MEDICARE

## 2019-01-01 ENCOUNTER — TELEPHONE (OUTPATIENT)
Dept: INTERNAL MEDICINE | Facility: CLINIC | Age: 79
End: 2019-01-01

## 2019-01-01 ENCOUNTER — OFFICE VISIT (OUTPATIENT)
Dept: CARDIOLOGY | Facility: CLINIC | Age: 79
End: 2019-01-01
Payer: MEDICARE

## 2019-01-01 ENCOUNTER — HOSPITAL ENCOUNTER (EMERGENCY)
Facility: CLINIC | Age: 79
Discharge: HOME | End: 2019-01-17
Attending: PHYSICIAN ASSISTANT | Admitting: PHYSICIAN ASSISTANT
Payer: MEDICARE

## 2019-01-01 ENCOUNTER — HOSPITAL ENCOUNTER (EMERGENCY)
Facility: CLINIC | Age: 79
Discharge: ANOTHER HOSPITAL | End: 2019-02-25
Attending: FAMILY MEDICINE | Admitting: FAMILY MEDICINE
Payer: MEDICARE

## 2019-01-01 ENCOUNTER — HOSPITAL ENCOUNTER (EMERGENCY)
Facility: CLINIC | Age: 79
Discharge: HOME | End: 2019-01-07
Attending: EMERGENCY MEDICINE | Admitting: EMERGENCY MEDICINE
Payer: MEDICARE

## 2019-01-01 ENCOUNTER — TRANSFERRED RECORDS (OUTPATIENT)
Dept: HEALTH INFORMATION MANAGEMENT | Facility: CLINIC | Age: 79
End: 2019-01-01

## 2019-01-01 ENCOUNTER — ANTICOAGULATION THERAPY VISIT (OUTPATIENT)
Dept: INTERNAL MEDICINE | Facility: CLINIC | Age: 79
End: 2019-01-01
Payer: MEDICARE

## 2019-01-01 ENCOUNTER — TELEPHONE (OUTPATIENT)
Dept: ANTICOAGULATION | Facility: CLINIC | Age: 79
End: 2019-01-01

## 2019-01-01 ENCOUNTER — CARE COORDINATION (OUTPATIENT)
Dept: TRANSPLANT | Facility: CLINIC | Age: 79
End: 2019-01-01

## 2019-01-01 ENCOUNTER — DOCUMENTATION ONLY (OUTPATIENT)
Dept: CARE COORDINATION | Facility: CLINIC | Age: 79
End: 2019-01-01

## 2019-01-01 ENCOUNTER — OFFICE VISIT (OUTPATIENT)
Dept: INTERNAL MEDICINE | Facility: CLINIC | Age: 79
End: 2019-01-01
Payer: MEDICARE

## 2019-01-01 ENCOUNTER — ANTICOAGULATION THERAPY VISIT (OUTPATIENT)
Dept: ANTICOAGULATION | Facility: CLINIC | Age: 79
End: 2019-01-01
Payer: MEDICARE

## 2019-01-01 ENCOUNTER — ANTICOAGULATION THERAPY VISIT (OUTPATIENT)
Dept: ANTICOAGULATION | Facility: CLINIC | Age: 79
End: 2019-01-01

## 2019-01-01 ENCOUNTER — HOSPITAL ENCOUNTER (OUTPATIENT)
Dept: CARDIOLOGY | Facility: CLINIC | Age: 79
Discharge: HOME | End: 2019-01-22
Attending: NURSE PRACTITIONER | Admitting: NURSE PRACTITIONER
Payer: MEDICARE

## 2019-01-01 ENCOUNTER — APPOINTMENT (OUTPATIENT)
Dept: GENERAL RADIOLOGY | Facility: CLINIC | Age: 79
End: 2019-01-01
Attending: FAMILY MEDICINE
Payer: MEDICARE

## 2019-01-01 VITALS
HEIGHT: 64 IN | WEIGHT: 128 LBS | DIASTOLIC BLOOD PRESSURE: 52 MMHG | OXYGEN SATURATION: 98 % | BODY MASS INDEX: 21.85 KG/M2 | HEART RATE: 74 BPM | RESPIRATION RATE: 16 BRPM | TEMPERATURE: 97.5 F | SYSTOLIC BLOOD PRESSURE: 104 MMHG

## 2019-01-01 VITALS
BODY MASS INDEX: 24.16 KG/M2 | DIASTOLIC BLOOD PRESSURE: 70 MMHG | WEIGHT: 141.5 LBS | OXYGEN SATURATION: 95 % | HEIGHT: 64 IN | SYSTOLIC BLOOD PRESSURE: 122 MMHG | HEART RATE: 90 BPM

## 2019-01-01 VITALS
OXYGEN SATURATION: 90 % | RESPIRATION RATE: 20 BRPM | HEART RATE: 72 BPM | TEMPERATURE: 98.6 F | SYSTOLIC BLOOD PRESSURE: 103 MMHG | BODY MASS INDEX: 20.53 KG/M2 | DIASTOLIC BLOOD PRESSURE: 59 MMHG | WEIGHT: 119.6 LBS

## 2019-01-01 VITALS
WEIGHT: 121.3 LBS | OXYGEN SATURATION: 98 % | TEMPERATURE: 97.6 F | HEART RATE: 95 BPM | BODY MASS INDEX: 20.82 KG/M2 | DIASTOLIC BLOOD PRESSURE: 64 MMHG | RESPIRATION RATE: 16 BRPM | SYSTOLIC BLOOD PRESSURE: 128 MMHG

## 2019-01-01 VITALS
SYSTOLIC BLOOD PRESSURE: 92 MMHG | DIASTOLIC BLOOD PRESSURE: 49 MMHG | OXYGEN SATURATION: 83 % | BODY MASS INDEX: 20.08 KG/M2 | BODY MASS INDEX: 20.6 KG/M2 | HEART RATE: 66 BPM | RESPIRATION RATE: 18 BRPM | SYSTOLIC BLOOD PRESSURE: 84 MMHG | RESPIRATION RATE: 22 BRPM | WEIGHT: 120 LBS | TEMPERATURE: 97.2 F | OXYGEN SATURATION: 95 % | DIASTOLIC BLOOD PRESSURE: 51 MMHG | HEART RATE: 101 BPM | TEMPERATURE: 95 F | WEIGHT: 117 LBS

## 2019-01-01 VITALS
HEART RATE: 81 BPM | BODY MASS INDEX: 21.28 KG/M2 | RESPIRATION RATE: 18 BRPM | WEIGHT: 124 LBS | DIASTOLIC BLOOD PRESSURE: 60 MMHG | TEMPERATURE: 98.1 F | SYSTOLIC BLOOD PRESSURE: 120 MMHG | OXYGEN SATURATION: 98 %

## 2019-01-01 VITALS
DIASTOLIC BLOOD PRESSURE: 68 MMHG | TEMPERATURE: 96.7 F | BODY MASS INDEX: 22.83 KG/M2 | RESPIRATION RATE: 16 BRPM | WEIGHT: 133 LBS | SYSTOLIC BLOOD PRESSURE: 115 MMHG | HEART RATE: 87 BPM | OXYGEN SATURATION: 96 %

## 2019-01-01 VITALS
DIASTOLIC BLOOD PRESSURE: 52 MMHG | SYSTOLIC BLOOD PRESSURE: 78 MMHG | RESPIRATION RATE: 18 BRPM | OXYGEN SATURATION: 92 % | TEMPERATURE: 98.4 F | WEIGHT: 172.4 LBS | BODY MASS INDEX: 29.59 KG/M2 | HEART RATE: 76 BPM

## 2019-01-01 VITALS
DIASTOLIC BLOOD PRESSURE: 61 MMHG | SYSTOLIC BLOOD PRESSURE: 102 MMHG | BODY MASS INDEX: 24.6 KG/M2 | TEMPERATURE: 93.3 F | RESPIRATION RATE: 19 BRPM | OXYGEN SATURATION: 95 % | HEART RATE: 80 BPM | WEIGHT: 143.3 LBS

## 2019-01-01 VITALS
RESPIRATION RATE: 20 BRPM | HEART RATE: 105 BPM | BODY MASS INDEX: 23.17 KG/M2 | WEIGHT: 135 LBS | TEMPERATURE: 97.8 F | OXYGEN SATURATION: 90 % | DIASTOLIC BLOOD PRESSURE: 83 MMHG | SYSTOLIC BLOOD PRESSURE: 144 MMHG

## 2019-01-01 DIAGNOSIS — Z98.84 HX OF GASTRIC BYPASS: ICD-10-CM

## 2019-01-01 DIAGNOSIS — I48.91 ATRIAL FIBRILLATION WITH RVR (H): ICD-10-CM

## 2019-01-01 DIAGNOSIS — N18.6 ESRD (END STAGE RENAL DISEASE) ON DIALYSIS (H): Primary | ICD-10-CM

## 2019-01-01 DIAGNOSIS — E11.22 TYPE 2 DIABETES MELLITUS WITH CHRONIC KIDNEY DISEASE ON CHRONIC DIALYSIS, WITHOUT LONG-TERM CURRENT USE OF INSULIN (H): Primary | ICD-10-CM

## 2019-01-01 DIAGNOSIS — N18.6 TYPE 2 DIABETES MELLITUS WITH CHRONIC KIDNEY DISEASE ON CHRONIC DIALYSIS, WITHOUT LONG-TERM CURRENT USE OF INSULIN (H): Primary | ICD-10-CM

## 2019-01-01 DIAGNOSIS — G81.94 LEFT HEMIPARESIS (H): ICD-10-CM

## 2019-01-01 DIAGNOSIS — N18.6 ESRD (END STAGE RENAL DISEASE) ON DIALYSIS (H): ICD-10-CM

## 2019-01-01 DIAGNOSIS — G81.94 LEFT HEMIPARESIS (H): Primary | ICD-10-CM

## 2019-01-01 DIAGNOSIS — Z99.2 DIALYSIS PATIENT (H): ICD-10-CM

## 2019-01-01 DIAGNOSIS — K21.9 GASTROESOPHAGEAL REFLUX DISEASE, ESOPHAGITIS PRESENCE NOT SPECIFIED: ICD-10-CM

## 2019-01-01 DIAGNOSIS — Z99.2 TYPE 2 DIABETES MELLITUS WITH CHRONIC KIDNEY DISEASE ON CHRONIC DIALYSIS, WITHOUT LONG-TERM CURRENT USE OF INSULIN (H): ICD-10-CM

## 2019-01-01 DIAGNOSIS — I50.9 CHRONIC HEART FAILURE, UNSPECIFIED HEART FAILURE TYPE (H): ICD-10-CM

## 2019-01-01 DIAGNOSIS — E11.22 TYPE 2 DIABETES MELLITUS WITH CHRONIC KIDNEY DISEASE ON CHRONIC DIALYSIS, WITHOUT LONG-TERM CURRENT USE OF INSULIN (H): ICD-10-CM

## 2019-01-01 DIAGNOSIS — Z53.9 ERRONEOUS ENCOUNTER--DISREGARD: Primary | ICD-10-CM

## 2019-01-01 DIAGNOSIS — I73.9 PAD (PERIPHERAL ARTERY DISEASE) (H): ICD-10-CM

## 2019-01-01 DIAGNOSIS — D63.1 ANEMIA DUE TO CHRONIC KIDNEY DISEASE, ON CHRONIC DIALYSIS (H): ICD-10-CM

## 2019-01-01 DIAGNOSIS — I48.0 PAROXYSMAL ATRIAL FIBRILLATION (H): ICD-10-CM

## 2019-01-01 DIAGNOSIS — Z79.01 LONG TERM CURRENT USE OF ANTICOAGULANT THERAPY: ICD-10-CM

## 2019-01-01 DIAGNOSIS — Z99.2 ANEMIA DUE TO CHRONIC KIDNEY DISEASE, ON CHRONIC DIALYSIS (H): ICD-10-CM

## 2019-01-01 DIAGNOSIS — Z99.2 END-STAGE RENAL DISEASE ON HEMODIALYSIS (H): ICD-10-CM

## 2019-01-01 DIAGNOSIS — I25.10 ATHEROSCLEROSIS OF NATIVE CORONARY ARTERY OF NATIVE HEART WITHOUT ANGINA PECTORIS: ICD-10-CM

## 2019-01-01 DIAGNOSIS — N18.6 ANEMIA DUE TO CHRONIC KIDNEY DISEASE, ON CHRONIC DIALYSIS (H): ICD-10-CM

## 2019-01-01 DIAGNOSIS — N18.6 TYPE 2 DIABETES MELLITUS WITH CHRONIC KIDNEY DISEASE ON CHRONIC DIALYSIS, WITHOUT LONG-TERM CURRENT USE OF INSULIN (H): ICD-10-CM

## 2019-01-01 DIAGNOSIS — I69.319 COGNITIVE DEFICIT AS LATE EFFECT OF CEREBROVASCULAR ACCIDENT (CVA): ICD-10-CM

## 2019-01-01 DIAGNOSIS — I95.9 HYPOTENSION, UNSPECIFIED HYPOTENSION TYPE: ICD-10-CM

## 2019-01-01 DIAGNOSIS — I48.92 ATRIAL FLUTTER, UNSPECIFIED TYPE (H): ICD-10-CM

## 2019-01-01 DIAGNOSIS — Z99.2 ESRD (END STAGE RENAL DISEASE) ON DIALYSIS (H): ICD-10-CM

## 2019-01-01 DIAGNOSIS — E11.8 TYPE 2 DIABETES MELLITUS WITH COMPLICATION, WITHOUT LONG-TERM CURRENT USE OF INSULIN (H): ICD-10-CM

## 2019-01-01 DIAGNOSIS — Z86.73 HISTORY OF CARDIOEMBOLIC CEREBROVASCULAR ACCIDENT (CVA): ICD-10-CM

## 2019-01-01 DIAGNOSIS — R00.1 BRADYCARDIA: ICD-10-CM

## 2019-01-01 DIAGNOSIS — G25.81 RESTLESS LEG SYNDROME: ICD-10-CM

## 2019-01-01 DIAGNOSIS — Z99.3 WHEELCHAIR BOUND: ICD-10-CM

## 2019-01-01 DIAGNOSIS — E87.5 HYPERKALEMIA: ICD-10-CM

## 2019-01-01 DIAGNOSIS — I50.32 CHRONIC DIASTOLIC CONGESTIVE HEART FAILURE (H): ICD-10-CM

## 2019-01-01 DIAGNOSIS — Z71.89 ACP (ADVANCE CARE PLANNING): ICD-10-CM

## 2019-01-01 DIAGNOSIS — E46 PROTEIN-CALORIE MALNUTRITION, UNSPECIFIED SEVERITY (H): ICD-10-CM

## 2019-01-01 DIAGNOSIS — N18.6 END-STAGE RENAL DISEASE ON HEMODIALYSIS (H): ICD-10-CM

## 2019-01-01 DIAGNOSIS — Z87.11 HISTORY OF PEPTIC ULCER DISEASE: ICD-10-CM

## 2019-01-01 DIAGNOSIS — F32.A DEPRESSION, UNSPECIFIED DEPRESSION TYPE: ICD-10-CM

## 2019-01-01 DIAGNOSIS — N18.4 ACUTE RENAL FAILURE SUPERIMPOSED ON STAGE 4 CHRONIC KIDNEY DISEASE, UNSPECIFIED ACUTE RENAL FAILURE TYPE (H): ICD-10-CM

## 2019-01-01 DIAGNOSIS — Z51.81 ENCOUNTER FOR THERAPEUTIC DRUG MONITORING: ICD-10-CM

## 2019-01-01 DIAGNOSIS — I48.91 ATRIAL FIBRILLATION WITH RVR (H): Primary | ICD-10-CM

## 2019-01-01 DIAGNOSIS — G47.01 INSOMNIA DUE TO MEDICAL CONDITION: ICD-10-CM

## 2019-01-01 DIAGNOSIS — E78.5 HYPERLIPIDEMIA, UNSPECIFIED HYPERLIPIDEMIA TYPE: ICD-10-CM

## 2019-01-01 DIAGNOSIS — R11.0 NAUSEA: ICD-10-CM

## 2019-01-01 DIAGNOSIS — R53.81 PHYSICAL DECONDITIONING: ICD-10-CM

## 2019-01-01 DIAGNOSIS — Z95.0 CARDIAC PACEMAKER IN SITU: ICD-10-CM

## 2019-01-01 DIAGNOSIS — S80.10XA SUPERFICIAL BRUISING OF LOWER LEG: ICD-10-CM

## 2019-01-01 DIAGNOSIS — R09.2 RESPIRATORY ARREST (H): ICD-10-CM

## 2019-01-01 DIAGNOSIS — Z99.2 TYPE 2 DIABETES MELLITUS WITH CHRONIC KIDNEY DISEASE ON CHRONIC DIALYSIS, WITHOUT LONG-TERM CURRENT USE OF INSULIN (H): Primary | ICD-10-CM

## 2019-01-01 DIAGNOSIS — I49.5 TACHY-BRADY SYNDROME (H): ICD-10-CM

## 2019-01-01 DIAGNOSIS — R54 FRAILTY: ICD-10-CM

## 2019-01-01 DIAGNOSIS — Z99.2 ESRD (END STAGE RENAL DISEASE) ON DIALYSIS (H): Primary | ICD-10-CM

## 2019-01-01 DIAGNOSIS — R79.1 ELEVATED INR: ICD-10-CM

## 2019-01-01 DIAGNOSIS — I48.91 ATRIAL FIBRILLATION, UNSPECIFIED TYPE (H): Primary | ICD-10-CM

## 2019-01-01 DIAGNOSIS — I10 ESSENTIAL HYPERTENSION WITH GOAL BLOOD PRESSURE LESS THAN 140/90: ICD-10-CM

## 2019-01-01 DIAGNOSIS — Z93.1 G TUBE FEEDINGS (H): ICD-10-CM

## 2019-01-01 DIAGNOSIS — R53.1 WEAKNESS: ICD-10-CM

## 2019-01-01 DIAGNOSIS — Z53.9 DIAGNOSIS NOT YET DEFINED: Primary | ICD-10-CM

## 2019-01-01 DIAGNOSIS — N17.9 ACUTE RENAL FAILURE SUPERIMPOSED ON STAGE 4 CHRONIC KIDNEY DISEASE, UNSPECIFIED ACUTE RENAL FAILURE TYPE (H): ICD-10-CM

## 2019-01-01 DIAGNOSIS — G81.94 LEFT HEMIPLEGIA (H): Primary | ICD-10-CM

## 2019-01-01 DIAGNOSIS — I48.92 ATRIAL FLUTTER, UNSPECIFIED TYPE (H): Primary | ICD-10-CM

## 2019-01-01 DIAGNOSIS — R79.1 SUPRATHERAPEUTIC INR: ICD-10-CM

## 2019-01-01 DIAGNOSIS — I95.3 HEMODIALYSIS-ASSOCIATED HYPOTENSION: ICD-10-CM

## 2019-01-01 DIAGNOSIS — E16.2 HYPOGLYCEMIA: ICD-10-CM

## 2019-01-01 DIAGNOSIS — J18.9 PNEUMONIA OF LEFT LOWER LOBE DUE TO INFECTIOUS ORGANISM: ICD-10-CM

## 2019-01-01 DIAGNOSIS — J18.9 PNEUMONIA OF LOWER LOBE DUE TO INFECTIOUS ORGANISM, UNSPECIFIED LATERALITY: Primary | ICD-10-CM

## 2019-01-01 LAB
ALBUMIN SERPL-MCNC: 2.1 G/DL (ref 3.4–5)
ALBUMIN SERPL-MCNC: 2.1 G/DL (ref 3.4–5)
ALP SERPL-CCNC: 175 U/L (ref 40–150)
ALP SERPL-CCNC: 248 U/L (ref 40–150)
ALT SERPL W P-5'-P-CCNC: 15 U/L (ref 0–50)
ALT SERPL W P-5'-P-CCNC: 28 U/L (ref 0–50)
ANION GAP SERPL CALCULATED.3IONS-SCNC: 11 MMOL/L (ref 3–14)
ANION GAP SERPL CALCULATED.3IONS-SCNC: 5 MMOL/L (ref 3–14)
ANION GAP SERPL CALCULATED.3IONS-SCNC: 9 MMOL/L (ref 3–14)
AST SERPL W P-5'-P-CCNC: 24 U/L (ref 0–45)
AST SERPL W P-5'-P-CCNC: 27 U/L (ref 0–45)
BASE DEFICIT BLDV-SCNC: 8.6 MMOL/L
BASOPHILS # BLD AUTO: 0 10E9/L (ref 0–0.2)
BASOPHILS # BLD AUTO: 0.1 10E9/L (ref 0–0.2)
BASOPHILS # BLD AUTO: 0.1 10E9/L (ref 0–0.2)
BASOPHILS NFR BLD AUTO: 0.5 %
BASOPHILS NFR BLD AUTO: 1.1 %
BASOPHILS NFR BLD AUTO: 1.2 %
BILIRUB SERPL-MCNC: 0.4 MG/DL (ref 0.2–1.3)
BILIRUB SERPL-MCNC: 0.4 MG/DL (ref 0.2–1.3)
BUN SERPL-MCNC: 16 MG/DL (ref 7–30)
BUN SERPL-MCNC: 22 MG/DL (ref 7–30)
BUN SERPL-MCNC: 9 MG/DL (ref 7–30)
CA-I SERPL ISE-MCNC: 4.2 MG/DL (ref 4.4–5.2)
CALCIUM SERPL-MCNC: 7.2 MG/DL (ref 8.5–10.1)
CALCIUM SERPL-MCNC: 7.7 MG/DL (ref 8.5–10.1)
CALCIUM SERPL-MCNC: 7.8 MG/DL (ref 8.5–10.1)
CHLORIDE SERPL-SCNC: 103 MMOL/L (ref 94–109)
CHLORIDE SERPL-SCNC: 104 MMOL/L (ref 94–109)
CHLORIDE SERPL-SCNC: 114 MMOL/L (ref 94–109)
CO2 SERPL-SCNC: 19 MMOL/L (ref 20–32)
CO2 SERPL-SCNC: 27 MMOL/L (ref 20–32)
CO2 SERPL-SCNC: 34 MMOL/L (ref 20–32)
CREAT SERPL-MCNC: 1.33 MG/DL (ref 0.57–1.11)
CREAT SERPL-MCNC: 1.45 MG/DL (ref 0.52–1.04)
CREAT SERPL-MCNC: 3.46 MG/DL (ref 0.52–1.04)
CREAT SERPL-MCNC: 3.5 MG/DL (ref 0.52–1.04)
DIFFERENTIAL METHOD BLD: ABNORMAL
EOSINOPHIL NFR BLD AUTO: 0.7 %
EOSINOPHIL NFR BLD AUTO: 1.8 %
EOSINOPHIL NFR BLD AUTO: 2.9 %
ERYTHROCYTE [DISTWIDTH] IN BLOOD BY AUTOMATED COUNT: 18.2 % (ref 10–15)
ERYTHROCYTE [DISTWIDTH] IN BLOOD BY AUTOMATED COUNT: 19.7 % (ref 10–15)
ERYTHROCYTE [DISTWIDTH] IN BLOOD BY AUTOMATED COUNT: 20.8 % (ref 10–15)
GFR SERPL CREATININE-BSD FRML MDRD: 12 ML/MIN/{1.73_M2}
GFR SERPL CREATININE-BSD FRML MDRD: 12 ML/MIN/{1.73_M2}
GFR SERPL CREATININE-BSD FRML MDRD: 35 ML/MIN/{1.73_M2}
GFR SERPL CREATININE-BSD FRML MDRD: 39 ML/MIN/1.73M2
GLUCOSE BLDC GLUCOMTR-MCNC: 88 MG/DL (ref 70–99)
GLUCOSE SERPL-MCNC: 119 MG/DL (ref 65–100)
GLUCOSE SERPL-MCNC: 132 MG/DL (ref 70–99)
GLUCOSE SERPL-MCNC: 219 MG/DL (ref 70–99)
GLUCOSE SERPL-MCNC: 92 MG/DL (ref 70–99)
HCO3 BLDV-SCNC: 19 MMOL/L (ref 21–28)
HCT VFR BLD AUTO: 40.4 % (ref 35–47)
HCT VFR BLD AUTO: 41.2 % (ref 35–47)
HCT VFR BLD AUTO: 42.6 % (ref 35–47)
HGB BLD-MCNC: 12.1 G/DL (ref 11.7–15.7)
HGB BLD-MCNC: 12.5 G/DL (ref 11.7–15.7)
HGB BLD-MCNC: 12.8 G/DL (ref 11.7–15.7)
IMM GRANULOCYTES # BLD: 0 10E9/L (ref 0–0.4)
IMM GRANULOCYTES # BLD: 0.1 10E9/L (ref 0–0.4)
IMM GRANULOCYTES # BLD: 0.1 10E9/L (ref 0–0.4)
IMM GRANULOCYTES NFR BLD: 0.4 %
IMM GRANULOCYTES NFR BLD: 0.9 %
IMM GRANULOCYTES NFR BLD: 1.1 %
INR BLD: 1.9 (ref 0.86–1.14)
INR PPP: 1.9
INR PPP: 2.3
INR PPP: 4.1 (ref 0.86–1.14)
LYMPHOCYTES # BLD AUTO: 1.1 10E9/L (ref 0.8–5.3)
LYMPHOCYTES # BLD AUTO: 1.6 10E9/L (ref 0.8–5.3)
LYMPHOCYTES # BLD AUTO: 1.9 10E9/L (ref 0.8–5.3)
LYMPHOCYTES NFR BLD AUTO: 14 %
LYMPHOCYTES NFR BLD AUTO: 14.3 %
LYMPHOCYTES NFR BLD AUTO: 25.4 %
MAGNESIUM SERPL-MCNC: 2.2 MG/DL (ref 1.6–2.3)
MCH RBC QN AUTO: 26.7 PG (ref 26.5–33)
MCH RBC QN AUTO: 27.1 PG (ref 26.5–33)
MCH RBC QN AUTO: 28.2 PG (ref 26.5–33)
MCHC RBC AUTO-ENTMCNC: 30 G/DL (ref 31.5–36.5)
MCHC RBC AUTO-ENTMCNC: 30 G/DL (ref 31.5–36.5)
MCHC RBC AUTO-ENTMCNC: 30.3 G/DL (ref 31.5–36.5)
MCV RBC AUTO: 89 FL (ref 78–100)
MCV RBC AUTO: 89 FL (ref 78–100)
MCV RBC AUTO: 94 FL (ref 78–100)
MONOCYTES # BLD AUTO: 0.6 10E9/L (ref 0–1.3)
MONOCYTES # BLD AUTO: 0.7 10E9/L (ref 0–1.3)
MONOCYTES # BLD AUTO: 0.9 10E9/L (ref 0–1.3)
MONOCYTES NFR BLD AUTO: 7.9 %
MONOCYTES NFR BLD AUTO: 8.4 %
MONOCYTES NFR BLD AUTO: 9.9 %
NEUTROPHILS # BLD AUTO: 4.7 10E9/L (ref 1.6–8.3)
NEUTROPHILS # BLD AUTO: 5.4 10E9/L (ref 1.6–8.3)
NEUTROPHILS # BLD AUTO: 8.2 10E9/L (ref 1.6–8.3)
NEUTROPHILS NFR BLD AUTO: 64.1 %
NEUTROPHILS NFR BLD AUTO: 72.4 %
NEUTROPHILS NFR BLD AUTO: 73 %
NRBC # BLD AUTO: 0 10*3/UL
NRBC BLD AUTO-RTO: 0 /100
PCO2 BLDV: 49 MM HG (ref 40–50)
PH BLDV: 7.21 PH (ref 7.32–7.43)
PLATELET # BLD AUTO: 184 10E9/L (ref 150–450)
PLATELET # BLD AUTO: 213 10E9/L (ref 150–450)
PLATELET # BLD AUTO: 274 10E9/L (ref 150–450)
PO2 BLDV: 32 MM HG (ref 25–47)
POTASSIUM SERPL-SCNC: 3.3 MMOL/L (ref 3.4–5.3)
POTASSIUM SERPL-SCNC: 3.5 MMOL/L (ref 3.5–5)
POTASSIUM SERPL-SCNC: 3.9 MMOL/L (ref 3.4–5.3)
POTASSIUM SERPL-SCNC: 6.3 MMOL/L (ref 3.4–5.3)
PROT SERPL-MCNC: 5.2 G/DL (ref 6.8–8.8)
PROT SERPL-MCNC: 5.7 G/DL (ref 6.8–8.8)
RBC # BLD AUTO: 4.29 10E12/L (ref 3.8–5.2)
RBC # BLD AUTO: 4.62 10E12/L (ref 3.8–5.2)
RBC # BLD AUTO: 4.8 10E12/L (ref 3.8–5.2)
SODIUM SERPL-SCNC: 140 MMOL/L (ref 133–144)
SODIUM SERPL-SCNC: 142 MMOL/L (ref 133–144)
SODIUM SERPL-SCNC: 144 MMOL/L (ref 133–144)
TROPONIN I SERPL-MCNC: 0.04 UG/L (ref 0–0.04)
TROPONIN I SERPL-MCNC: 0.36 UG/L (ref 0–0.04)
TSH SERPL DL<=0.005 MIU/L-ACNC: 3.15 MU/L (ref 0.4–4)
WBC # BLD AUTO: 11.2 10E9/L (ref 4–11)
WBC # BLD AUTO: 7.4 10E9/L (ref 4–11)
WBC # BLD AUTO: 7.4 10E9/L (ref 4–11)

## 2019-01-01 PROCEDURE — 99285 EMERGENCY DEPT VISIT HI MDM: CPT | Mod: 25 | Performed by: FAMILY MEDICINE

## 2019-01-01 PROCEDURE — 82803 BLOOD GASES ANY COMBINATION: CPT | Performed by: FAMILY MEDICINE

## 2019-01-01 PROCEDURE — 25000125 ZZHC RX 250: Performed by: EMERGENCY MEDICINE

## 2019-01-01 PROCEDURE — 31500 INSERT EMERGENCY AIRWAY: CPT | Performed by: FAMILY MEDICINE

## 2019-01-01 PROCEDURE — A9270 NON-COVERED ITEM OR SERVICE: HCPCS | Mod: GY | Performed by: EMERGENCY MEDICINE

## 2019-01-01 PROCEDURE — 36416 COLLJ CAPILLARY BLOOD SPEC: CPT | Performed by: INTERNAL MEDICINE

## 2019-01-01 PROCEDURE — 83735 ASSAY OF MAGNESIUM: CPT | Performed by: FAMILY MEDICINE

## 2019-01-01 PROCEDURE — 93010 ELECTROCARDIOGRAM REPORT: CPT | Mod: Z6 | Performed by: EMERGENCY MEDICINE

## 2019-01-01 PROCEDURE — 84484 ASSAY OF TROPONIN QUANT: CPT | Performed by: EMERGENCY MEDICINE

## 2019-01-01 PROCEDURE — 99207 ZZC NO CHARGE NURSE ONLY: CPT | Performed by: INTERNAL MEDICINE

## 2019-01-01 PROCEDURE — 99291 CRITICAL CARE FIRST HOUR: CPT | Mod: 25 | Performed by: FAMILY MEDICINE

## 2019-01-01 PROCEDURE — 85025 COMPLETE CBC W/AUTO DIFF WBC: CPT | Performed by: EMERGENCY MEDICINE

## 2019-01-01 PROCEDURE — 93010 ELECTROCARDIOGRAM REPORT: CPT | Performed by: NURSE PRACTITIONER

## 2019-01-01 PROCEDURE — 25000132 ZZH RX MED GY IP 250 OP 250 PS 637: Mod: GY | Performed by: EMERGENCY MEDICINE

## 2019-01-01 PROCEDURE — 99285 EMERGENCY DEPT VISIT HI MDM: CPT | Mod: 25 | Performed by: EMERGENCY MEDICINE

## 2019-01-01 PROCEDURE — 00000146 ZZHCL STATISTIC GLUCOSE BY METER IP

## 2019-01-01 PROCEDURE — 0296T ZIO PATCH HOLTER ADULT PEDIATRIC GREATER THAN 48 HRS: CPT

## 2019-01-01 PROCEDURE — 80048 BASIC METABOLIC PNL TOTAL CA: CPT | Performed by: PHYSICIAN ASSISTANT

## 2019-01-01 PROCEDURE — 85610 PROTHROMBIN TIME: CPT | Performed by: PHYSICIAN ASSISTANT

## 2019-01-01 PROCEDURE — 99310 SBSQ NF CARE HIGH MDM 45: CPT | Mod: GW | Performed by: NURSE PRACTITIONER

## 2019-01-01 PROCEDURE — G0179 MD RECERTIFICATION HHA PT: HCPCS | Performed by: INTERNAL MEDICINE

## 2019-01-01 PROCEDURE — 36415 COLL VENOUS BLD VENIPUNCTURE: CPT | Performed by: PHYSICIAN ASSISTANT

## 2019-01-01 PROCEDURE — 84443 ASSAY THYROID STIM HORMONE: CPT | Performed by: EMERGENCY MEDICINE

## 2019-01-01 PROCEDURE — 99305 1ST NF CARE MODERATE MDM 35: CPT | Performed by: FAMILY MEDICINE

## 2019-01-01 PROCEDURE — 36415 COLL VENOUS BLD VENIPUNCTURE: CPT | Performed by: FAMILY MEDICINE

## 2019-01-01 PROCEDURE — 40000986 XR CHEST 1 VW

## 2019-01-01 PROCEDURE — 31500 INSERT EMERGENCY AIRWAY: CPT | Mod: Z6 | Performed by: FAMILY MEDICINE

## 2019-01-01 PROCEDURE — 99308 SBSQ NF CARE LOW MDM 20: CPT | Performed by: NURSE PRACTITIONER

## 2019-01-01 PROCEDURE — 99215 OFFICE O/P EST HI 40 MIN: CPT | Performed by: NURSE PRACTITIONER

## 2019-01-01 PROCEDURE — 85610 PROTHROMBIN TIME: CPT | Mod: QW | Performed by: INTERNAL MEDICINE

## 2019-01-01 PROCEDURE — 80053 COMPREHEN METABOLIC PANEL: CPT | Performed by: FAMILY MEDICINE

## 2019-01-01 PROCEDURE — 99284 EMERGENCY DEPT VISIT MOD MDM: CPT | Mod: 25 | Performed by: EMERGENCY MEDICINE

## 2019-01-01 PROCEDURE — 99310 SBSQ NF CARE HIGH MDM 45: CPT | Performed by: NURSE PRACTITIONER

## 2019-01-01 PROCEDURE — 85025 COMPLETE CBC W/AUTO DIFF WBC: CPT | Performed by: PHYSICIAN ASSISTANT

## 2019-01-01 PROCEDURE — 99283 EMERGENCY DEPT VISIT LOW MDM: CPT | Mod: 25 | Performed by: PHYSICIAN ASSISTANT

## 2019-01-01 PROCEDURE — 93005 ELECTROCARDIOGRAM TRACING: CPT | Performed by: EMERGENCY MEDICINE

## 2019-01-01 PROCEDURE — 80053 COMPREHEN METABOLIC PANEL: CPT | Performed by: EMERGENCY MEDICINE

## 2019-01-01 PROCEDURE — 25000125 ZZHC RX 250

## 2019-01-01 PROCEDURE — 93005 ELECTROCARDIOGRAM TRACING: CPT | Performed by: REHABILITATION PRACTITIONER

## 2019-01-01 PROCEDURE — 99284 EMERGENCY DEPT VISIT MOD MDM: CPT | Mod: Z6 | Performed by: PHYSICIAN ASSISTANT

## 2019-01-01 PROCEDURE — 99495 TRANSJ CARE MGMT MOD F2F 14D: CPT | Performed by: INTERNAL MEDICINE

## 2019-01-01 PROCEDURE — 84484 ASSAY OF TROPONIN QUANT: CPT | Performed by: FAMILY MEDICINE

## 2019-01-01 PROCEDURE — 85025 COMPLETE CBC W/AUTO DIFF WBC: CPT | Performed by: FAMILY MEDICINE

## 2019-01-01 PROCEDURE — 96374 THER/PROPH/DIAG INJ IV PUSH: CPT | Performed by: EMERGENCY MEDICINE

## 2019-01-01 PROCEDURE — 82330 ASSAY OF CALCIUM: CPT | Performed by: FAMILY MEDICINE

## 2019-01-01 RX ORDER — NICOTINE POLACRILEX 4 MG
15-30 LOZENGE BUCCAL
Status: DISCONTINUED | OUTPATIENT
Start: 2019-01-01 | End: 2019-01-01 | Stop reason: HOSPADM

## 2019-01-01 RX ORDER — ALBUTEROL SULFATE 0.83 MG/ML
SOLUTION RESPIRATORY (INHALATION)
Status: DISCONTINUED
Start: 2019-01-01 | End: 2019-01-01 | Stop reason: HOSPADM

## 2019-01-01 RX ORDER — DEXTROSE MONOHYDRATE 100 MG/ML
INJECTION, SOLUTION INTRAVENOUS CONTINUOUS
Status: DISCONTINUED | OUTPATIENT
Start: 2019-01-01 | End: 2019-01-01 | Stop reason: HOSPADM

## 2019-01-01 RX ORDER — ROPINIROLE 0.25 MG/1
0.25 TABLET, FILM COATED ORAL AT BEDTIME
COMMUNITY

## 2019-01-01 RX ORDER — AMIODARONE HYDROCHLORIDE 400 MG/1
TABLET ORAL
COMMUNITY

## 2019-01-01 RX ORDER — DILTIAZEM HYDROCHLORIDE 5 MG/ML
10 INJECTION INTRAVENOUS ONCE
Status: COMPLETED | OUTPATIENT
Start: 2019-01-01 | End: 2019-01-01

## 2019-01-01 RX ORDER — DILTIAZEM HYDROCHLORIDE 180 MG/1
180 CAPSULE, COATED, EXTENDED RELEASE ORAL DAILY
Qty: 30 CAPSULE | Refills: 5 | Status: CANCELLED | OUTPATIENT
Start: 2019-01-01

## 2019-01-01 RX ORDER — PENTOXIFYLLINE 400 MG/1
TABLET, EXTENDED RELEASE ORAL
Qty: 90 TABLET | Refills: 2 | Status: SHIPPED | OUTPATIENT
Start: 2019-01-01 | End: 2019-01-01

## 2019-01-01 RX ORDER — DOXYCYCLINE 100 MG/1
100 CAPSULE ORAL 2 TIMES DAILY
COMMUNITY
End: 2019-01-01

## 2019-01-01 RX ORDER — FAMOTIDINE 20 MG/1
20 TABLET, FILM COATED ORAL DAILY PRN
COMMUNITY
End: 2019-01-01

## 2019-01-01 RX ORDER — LIDOCAINE 50 MG/G
PATCH TOPICAL
COMMUNITY

## 2019-01-01 RX ORDER — SERTRALINE HYDROCHLORIDE 25 MG/1
25 TABLET, FILM COATED ORAL DAILY
COMMUNITY

## 2019-01-01 RX ORDER — POLYETHYLENE GLYCOL 3350 17 G/17G
1 POWDER, FOR SOLUTION ORAL DAILY PRN
COMMUNITY
End: 2019-01-01

## 2019-01-01 RX ORDER — LIDOCAINE/PRILOCAINE 2.5 %-2.5%
CREAM (GRAM) TOPICAL
COMMUNITY

## 2019-01-01 RX ORDER — PRAMIPEXOLE DIHYDROCHLORIDE 0.12 MG/1
TABLET ORAL
COMMUNITY
End: 2019-01-01

## 2019-01-01 RX ORDER — TRAZODONE HYDROCHLORIDE 50 MG/1
TABLET, FILM COATED ORAL
COMMUNITY
End: 2019-01-01

## 2019-01-01 RX ORDER — DILTIAZEM HYDROCHLORIDE 240 MG/1
240 CAPSULE, COATED, EXTENDED RELEASE ORAL DAILY
Qty: 90 CAPSULE | Refills: 3 | Status: SHIPPED | OUTPATIENT
Start: 2019-01-01 | End: 2019-01-01

## 2019-01-01 RX ORDER — MIDODRINE HYDROCHLORIDE 10 MG/1
10 TABLET ORAL
COMMUNITY

## 2019-01-01 RX ORDER — DEXTROSE MONOHYDRATE 25 G/50ML
25-50 INJECTION, SOLUTION INTRAVENOUS
Status: DISCONTINUED | OUTPATIENT
Start: 2019-01-01 | End: 2019-01-01 | Stop reason: HOSPADM

## 2019-01-01 RX ORDER — LANSOPRAZOLE 30 MG/1
30 CAPSULE, DELAYED RELEASE ORAL DAILY
COMMUNITY
End: 2019-01-01

## 2019-01-01 RX ORDER — DILTIAZEM HYDROCHLORIDE 180 MG/1
180 CAPSULE, COATED, EXTENDED RELEASE ORAL ONCE
Status: COMPLETED | OUTPATIENT
Start: 2019-01-01 | End: 2019-01-01

## 2019-01-01 RX ORDER — DEXTROSE MONOHYDRATE 25 G/50ML
25 INJECTION, SOLUTION INTRAVENOUS ONCE
Status: DISCONTINUED | OUTPATIENT
Start: 2019-01-01 | End: 2019-01-01 | Stop reason: HOSPADM

## 2019-01-01 RX ADMIN — DILTIAZEM HYDROCHLORIDE 10 MG: 5 INJECTION INTRAVENOUS at 13:16

## 2019-01-01 RX ADMIN — DILTIAZEM HYDROCHLORIDE 180 MG: 180 CAPSULE, COATED, EXTENDED RELEASE ORAL at 15:08

## 2019-01-01 ASSESSMENT — PAIN SCALES - GENERAL: PAINLEVEL: NO PAIN (0)

## 2019-01-01 ASSESSMENT — ENCOUNTER SYMPTOMS
NECK STIFFNESS: 0
ARTHRALGIAS: 0
HEADACHES: 0
CONFUSION: 0
SHORTNESS OF BREATH: 0
FEVER: 0
COLOR CHANGE: 0
DIFFICULTY URINATING: 0
ABDOMINAL PAIN: 0
EYE REDNESS: 0

## 2019-01-01 ASSESSMENT — MIFFLIN-ST. JEOR
SCORE: 1055.6
SCORE: 1116.84

## 2019-01-03 NOTE — TELEPHONE ENCOUNTER
Reason for Call:  INR    Who is calling?  Home Care: [facility]    Phone number:  [phone]    Fax number:      Name of caller: Marsha    INR Value:  1.9    Are there any other concerns:  No    Can we leave a detailed message on this number? YES    Phone number patient can be reached at: Other phone number:  [phone]*      Call taken on 1/3/2019 at 12:46 PM by Katlyn Gavin

## 2019-01-03 NOTE — PROGRESS NOTES
ANTICOAGULATION FOLLOW-UP CLINIC VISIT    Patient Name:  Kathryn Banks  Date:  1/3/2019  Contact Type:  Telephone/ Marsha - homecare    SUBJECTIVE:     Patient Findings     Positives:   No Problem Findings    Comments:   Homecare is discharging. Pt will be using her home monitor - recheck 1/10/19           OBJECTIVE    INR   Date Value Ref Range Status   2019 1.9  Final       ASSESSMENT / PLAN  INR assessment THER    Recheck INR In: 1 WEEK    INR Location Homecare INR      Anticoagulation Summary  As of 1/3/2019    INR goal:   2.0-3.0   TTR:   42.1 % (3.7 mo)   INR used for dosin.9! (1/3/2019)   Warfarin maintenance plan:   5 mg (2.5 mg x 2) every Mon, Fri; 2.5 mg (2.5 mg x 1) all other days   Full warfarin instructions:   5 mg every Mon, Fri; 2.5 mg all other days   Weekly warfarin total:   22.5 mg   No change documented:   Gianni Palumbo RN   Plan last modified:   Gianni Palumbo RN (2018)   Next INR check:   1/10/2019   Target end date:            Anticoagulation Episode Summary     INR check location:       Preferred lab:       Send INR reminders to:   [place] RANDI    Comments:   2.5 mg tabs      Anticoagulation Care Providers     Provider Role Specialty Phone number    Dheeraj Jj MD [facility] Internal Medicine [phone]            See the Encounter Report to view Anticoagulation Flowsheet and Dosing Calendar (Go to Encounters tab in chart review, and find the Anticoagulation Therapy Visit)    Dosage adjustment made based on physician directed care plan.    Gianni Palumbo RN

## 2019-01-07 NOTE — ED AVS SNAPSHOT
[facility] Emergency Department  [address] [place] [address]  Phone:  [phone]  Fax:  [phone]                                    Kathrny Banks   MRN: [id number]    Department:  [facility] Emergency Department   Date of Visit:  1/7/2019           After Visit Summary Signature Page    I have received my discharge instructions, and my questions have been answered. I have discussed any challenges I see with this plan with the nurse or doctor.    ..........................................................................................................................................  Patient/Patient Representative Signature      ..........................................................................................................................................  Patient Representative Print Name and Relationship to Patient    ..................................................               ................................................  Date                                   Time    ..........................................................................................................................................  Reviewed by Signature/Title    ...................................................              ..............................................  Date                                               Time          22EPIC Rev 08/18

## 2019-01-07 NOTE — TELEPHONE ENCOUNTER
Monique from [facility] [employer] calling. Pt would like to start doing her INR's at home again. They will be faxing a form for you to sign as well. Please call her at [phone] option [phone].   Thank you,   Zahira Diza- Pt Rep.

## 2019-01-07 NOTE — ED PROVIDER NOTES
"  History     Chief Complaint   Patient presents with     Irregular Heart Beat     The history is provided by the patient.    : Kathryn Banks is a 78 year old female with a history of long term use of anticoagulant therapy, ESRD, diabetes type II, CHF, atrial flutter, and pulmonary hypertension who presents to the emergency department for an irregular heart beat. Patient reports she was at her dialysis appointment earlier today and while she was getting her dialysis she went into AFIB with a low blood pressure and a high pulse. She states she got all her dialysis except for her last 15 minutes. She reports being on dialysis since 10/16. Patient states she is having lightheadedness, however, denies any shortness of breath or chest pain. Patient states she is not currently using her C-PAP machine due to not sleeping. She states she usually just takes \"cat naps\". She is currently taking her Metoprolol and Amiodarone, has not started her Diltiazem yet. Her last INR was 1.9 on 1/3/18.    Problem List:    Patient Active Problem List    Diagnosis Date Noted     Long term current use of anticoagulant therapy 09/10/2018     Priority: Medium     Contusion of right hip, initial encounter 03/23/2018     Priority: Medium     ESRD (end stage renal disease) on dialysis (H) 11/06/2017     Priority: Medium     Physical deconditioning 11/06/2017     Priority: Medium     Bilateral leg edema 11/06/2017     Priority: Medium     DM type 2 -- Hgb A1C 6.6 on 10/13/17 10/29/2017     Priority: Medium     SBO -- S/P Lysis of Adhes 10/24/17 10/22/2017     Priority: Medium     Paroxysmal atrial fibrillation (H) 10/20/2017     Priority: Medium     Anxiety 10/20/2017     Priority: Medium     Anemia in chronic kidney disease 09/20/2017     Priority: Medium     Microscopic hematuria 06/21/2017     Priority: Medium     Acute on chronic renal failure -- Dialysis started 10/2017 06/12/2017     Priority: Medium     Memory loss 06/12/2017     " Priority: Medium     Proteinuria 2.1 g/g Cr 06/02/2017     Priority: Medium     Chronic heart failure (H) with preserved EF 05/31/2017     Priority: Medium     Atrial flutter (H) - present 6/12 05/31/2017     Priority: Medium     Pulmonary hypertension (H) 05/24/2017     Priority: Medium     Postprocedural hypotension 03/09/2017     Priority: Medium     Fall 02/10/2017     Priority: Medium     Type 2 diabetes mellitus with complication (H) 02/03/2017     Priority: Medium     Fall off motorized mobility scooter, initial encounter 02/03/2017     Priority: Medium     Acute decompensated heart failure (H) 02/03/2017     Priority: Medium     Overview:   HFpEF with EF 60-65% by echo 2/4/17.       Essential hypertension with goal blood pressure less than 140/90 09/13/2016     Priority: Medium     Osteoarthritis of hip 04/29/2015     Priority: Medium     Lymphedema 11/10/2014     Priority: Medium     Health Care Home 01/20/2014     Priority: Medium     State Tier Level:  Tier 3  Status:  Closed  Care Coordinator:  Pinky Oden if needed in the future.     See Letters for [facility] Care Plan         CAD - NSTEMI, CABG x 5 in 2006, angio 2013 patent grafts except occluded graft to PL 02/04/2013     Priority: Medium     Hyperlipidemia LDL goal <100 10/31/2010     Priority: Medium     Kidney stone 12/29/2009     Priority: Medium     Morbid obesity (H) 05/14/2008     Priority: Medium     Esophageal reflux 03/28/2006     Priority: Medium     Lipoma of other skin and subcutaneous tissue 09/07/2004     Priority: Medium     ANABEL on CPAP      Priority: Medium     Advanced directives, counseling/discussion 11/12/2012     Priority: Low     Advance Directive received and scanned. Click on Code in the patient header to view. 11/12/2012          History of peptic ulcer disease 10/17/2007     Priority: Low     Problem list name updated by automated process. Provider to review       Restless leg syndrome      Priority: Low        Past Medical  History:    Past Medical History:   Diagnosis Date     Carpal tunnel syndrome      Coronary atherosclerosis of native coronary artery 2006     OBSTRUCTIVE SLEEP APNEA      Osteoarthrosis, unspecified whether generalized or localized, unspecified site      Other and combined forms of senile cataract 2000     Other and unspecified hyperlipidemia      Paroxysmal atrial fibrillation (H)      Renal failure      RESTLESS LEGS SYNDROME      TENOSYNOV HAND/WRIST NEC 6/30/2006     Type II or unspecified type diabetes mellitus without mention of complication, not stated as uncontrolled 2001     Typical atrial flutter (H) 01/17/2007     Unspecified essential hypertension        Past Surgical History:    Past Surgical History:   Procedure Laterality Date     ANGIOPLASTY       C APPENDECTOMY       C CABG, VEIN, FIVE  12/06    SVG x 4 and LIMA to LAD     C SOTO W/O FACETEC FORAMOT/DSKC 1/2 VRT SEG, LUMBAR  1968     C REMV CATARACT INTRACAP,INSERT LENS      Bilateral     C TOTAL ABDOM HYSTERECTOMY  1995     - fibroids     C VAGOTOMY/PYLOROPLASTY,SELECT  1970     COLONOSCOPY  12/3/2007     COLONOSCOPY  1/17/2014    Procedure: COLONOSCOPY;  Colonoscopy;  Surgeon: Zack Stearns MD;  Location: [facility]     CREATE GRAFT LOOP ARTERIOVENOUS UPPER EXTREMITY Left 1/9/2018    Procedure: CREATE GRAFT ARTERIOVENOUS UPPER EXTREMITY;  Left Upper Arm Arteriovenous Graft Creation, Anesthesia Block ;  Surgeon: Cassie Cardenas MD;  Location: [facility]     CYSTOSCOPY  11/25/09    [facility]     ENDOSCOPY  03/21/2000    [facility]     HC COLONOSCOPY THRU STOMA, DIAGNOSTIC  10/7/04    poor prep, repeat in 2-3 years     HC COLONOSCOPY W/WO BRUSH/WASH  10/31/07    Repeat-poor quality prep     HC REMOVAL OF OVARY/TUBE(S)      Salpingo-Oophorectomy, Unilateral     HC REVISE MEDIAN N/CARPAL TUNNEL SURG      Carpal tunnel release     HC UGI ENDOSCOPY DIAG W BIOPSY  10/31/07     HC UGI ENDOSCOPY, SIMPLE EXAM  12/3/2007     LAPAROTOMY, LYSIS ADHESIONS, COMBINED N/A  10/24/2017    Procedure: COMBINED LAPAROTOMY, LYSIS ADHESIONS;  REPAIR FOCAL ILEAL SMALL BOWEL PERFERATION, LYSIS OF ADHESIONS.;  Surgeon: Rashard Zafar MD;  Location: [facility]     OPEN REDUCTION INTERNAL FIXATION HIP NAILING Left 7/3/2016    Procedure: OPEN REDUCTION INTERNAL FIXATION HIP NAILING;  Surgeon: Lake Schulz MD;  Location: [facility]       Family History:    Family History   Problem Relation Age of Onset     Diabetes Father         AODM     Neurologic Disorder Father         Parkinson's     Blood Disease Father          from blood clot from leg to lung immediately after hip fracture     Diabetes Paternal Grandmother         adult onset     Diabetes Maternal Grandmother         adult onset     Hypertension No family hx of      Cerebrovascular Disease No family hx of        Social History:  Marital Status:   [2]  Social History     Tobacco Use     Smoking status: Former Smoker     Years: 10.00     Last attempt to quit: 1969     Years since quittin.0     Smokeless tobacco: Never Used   Substance Use Topics     Alcohol use: Yes     Alcohol/week: 0.0 oz     Comment: 1/2 a beer once a month     Drug use: No        Medications:      ACETAMINOPHEN PO   B Complex-C-Folic Acid (RENAL) 1 MG CAPS   calcium acetate (PHOSLO) 667 MG CAPS capsule   diltiazem ER COATED BEADS (CARDIZEM CD/CARTIA XT) 180 MG 24 hr capsule   loperamide (IMODIUM) 2 MG capsule   metoprolol tartrate (LOPRESSOR) 25 MG tablet   NEPHROCAPS (TRIPHROCAPS) 1 MG capsule   nitroGLYcerin (NITROSTAT) 0.4 MG sublingual tablet   pentoxifylline (TRENTAL) 400 MG CR tablet   pramipexole (MIRAPEX) 0.125 MG tablet   pravastatin (PRAVACHOL) 40 MG tablet   senna-docusate (SENOKOT-S;PERICOLACE) 8.6-50 MG per tablet   warfarin (COUMADIN) 2.5 MG tablet         Review of Systems   Constitutional: Negative for fever.   HENT: Negative for congestion.    Eyes: Negative for redness.   Respiratory: Negative for shortness of breath.     Cardiovascular: Negative for chest pain.   Gastrointestinal: Negative for abdominal pain.   Genitourinary: Negative for difficulty urinating.   Musculoskeletal: Negative for arthralgias and neck stiffness.   Skin: Negative for color change.   Neurological: Negative for headaches.   Psychiatric/Behavioral: Negative for confusion.   All other systems reviewed and are negative.      Physical Exam   BP: 96/68  Heart Rate: 143  Temp: 97.8  F (36.6  C)  Resp: 16  Weight: 61.2 kg (135 lb)  SpO2: 96 %      Physical Exam   Constitutional: She is oriented to person, place, and time. Vital signs are normal. She does not appear ill. No distress.   HENT:   Head: Normocephalic and atraumatic.   Nose: Nose normal.   Mouth/Throat: Oropharynx is clear and moist and mucous membranes are normal.   Eyes: Conjunctivae, EOM and lids are normal. Pupils are equal, round, and reactive to light. No scleral icterus.   Fundoscopic exam:       The right eye shows no hemorrhage.        The left eye shows no hemorrhage.   Neck: Trachea normal. Neck supple. No JVD present. Carotid bruit is not present.   Cardiovascular: S1 normal, S2 normal and intact distal pulses. An irregularly irregular rhythm present.  Occasional extrasystoles are present. Tachycardia present. PMI is not displaced.   No murmur heard.  Pulmonary/Chest: Effort normal and breath sounds normal. No respiratory distress.   Abdominal: Soft. Bowel sounds are normal. She exhibits no distension. There is no splenomegaly or hepatomegaly. There is no tenderness. There is no rebound. No hernia.   Musculoskeletal: Normal range of motion.   Lymphadenopathy:     She has no cervical adenopathy.   Neurological: She is alert and oriented to person, place, and time. She has normal strength and normal reflexes. No sensory deficit.   Skin: Skin is warm and dry. No rash noted. No pallor.   Psychiatric: She has a normal mood and affect. Her speech is normal and behavior is normal. Cognition and  memory are normal.   Nursing note and vitals reviewed.      ED Course        Procedures          EKG:  Interpretation by Mike Hernandez DO.   Indication: Tachycardia atrial fibrillation.  Rate 141 bpm.  Voltage criteria meets left ventricular hypertrophy.  Patient also has flutter waves with findings consistent with A. fib a flutter.  There is abnormal repolarization which is nonspecific his first ST segment depression on this might be related to her LVH.  There is no ST segment elevation.    Impressions abnormal EKG with A. fib RVR.             No results found. However, due to the size of the patient record, not all encounters were searched. Please check Results Review for a complete set of results.    Medications - No data to display    Assessments & Plan (with Medical Decision Making)  78-year-old female presents with irregular irregular rhythm with tachycardia.  She is not lightheaded or dizzy.  Complains of no dyspnea or chest discomfort.  She was in dialysis and they noted in the last 15 minutes of her run that she converted from sinus rhythm into A. fib with RVR.  Sent to the ED.  History for atrial fibrillation.  Currently on warfarin.  INR was 1.9 a few days ago.  She is otherwise asymptomatic including not feeling lightheaded or dizzy.  Does feel some generalized weakness.  Examination and EKG confirmed A. fib RVR with rate of 141 bpm.  She was soft and blood pressures with systolic blood pressure from 90/60.  She looked fluid volume down and was at her dry weight with his having completed dialysis.  Started an IV and started to infuse saline at 500cc.  This gives better pressure support allowing us to and then administer diltiazem 10 mg IV very slowly.  Did not 5 mg increments with positive response.  She initially achieved rate control with heart rate around 100 and spontaneously converted to sinus with a heart rate of 80.   The patient was seen by cardiology in December 31 advised to stop amiodarone  and switch to diltiazem.  She never made the conversion.  We will give her diltiazem 180mg extended release dose before she leaves and she has a prescription for this at home.  She knows to stop the amiodarone with starting the diltiazem.  She should schedule clinic appointment in the next 7-10 days.  No other changes medications.     I have reviewed the nursing notes.    I have reviewed the findings, diagnosis, plan and need for follow up with the patient.         Medication List      There are no discharge medications for this visit.         Final diagnoses:   Atrial fibrillation with RVR (H)   Dialysis patient (H)     This document serves as a record of services personally performed by Chris Hernandez DO. It was created on their behalf by Georgina Allen, a trained medical scribe. The creation of this record is based on the provider's personal observations and the statements of the patient. This document has been checked and approved by the attending provider.    Note: Chart documentation done in part with Dragon Voice Recognition software. Although reviewed after completion, some word and grammatical errors may remain.    1/7/2019   [facility] EMERGENCY DEPARTMENT     Mike Hernandez DO  01/07/19 1446

## 2019-01-07 NOTE — ED TRIAGE NOTES
Presents with irregular heart beat that started during dialysis this morning. States she currently feels lethargic and weak.

## 2019-01-07 NOTE — DISCHARGE INSTRUCTIONS
Please stop amiodarone.  Start diltiazem 180mg dose once daily.  He received her first dose of 82 days you do not have to start till tomorrow morning.  Schedule appointment the clinic next 7-10days.  Continue with other current medications.  Continue with current dialysis schedule.

## 2019-01-09 NOTE — TELEPHONE ENCOUNTER
I spoke with pt and let her know that we do not fill strips and that she will need to call [facility]. She said that she did and they said that needs an RX from clinic.    I called [phone] (press option 4). I spoke with Glenda who said needs physician form filled out. She will fax this    Pt informed.   Zo Davis RN

## 2019-01-09 NOTE — TELEPHONE ENCOUNTER
Reason for Call:  Medication or medication refill:    Do you use a [facility]?  Name of the pharmacy and phone number for the current request:  [facility] #[phone]    Name of the medication requested: INR test strips for machine    Other request: patient calling states that she needs these now, as she used to get them from the home healthcare nurse    Can we leave a detailed message on this number? YES    Phone number patient can be reached at: Home number on file [phone] (home)    Best Time: any    Call taken on 1/9/2019 at 4:32 PM by Tabitha Bland

## 2019-01-10 NOTE — PROGRESS NOTES
ANTICOAGULATION FOLLOW-UP CLINIC VISIT    Patient Name:  Kathryn Banks  Date:  1/10/2019  Contact Type:  Telephone/ [facility] - homecare    SUBJECTIVE:     Patient Findings     Positives:   No Problem Findings    Comments:   Reason for Call:  INR     Who is calling?  Patient called in with the home care RN with her     Phone number: [phone]      Name of caller: Kathryn     INR Value:  2.3      Are there any other concerns:  No, patient would like a call right away because the home care RN is waiting to set up her medication.      Can we leave a detailed message on this number? YES     Phone number patient can be reached at: Home number on file [phone] (home)        Call taken on 1/10/2019 at 11:57 AM by Christina Rodriguez           OBJECTIVE    INR   Date Value Ref Range Status   01/10/2019 2.3  Final       ASSESSMENT / PLAN  INR assessment THER    Recheck INR In: 1 WEEK    INR Location Homecare INR      Anticoagulation Summary  As of 1/10/2019    INR goal:   2.0-3.0   TTR:   44.1 % (3.9 mo)   INR used for dosin.3 (1/10/2019)   Warfarin maintenance plan:   5 mg (2.5 mg x 2) every Mon, Fri; 2.5 mg (2.5 mg x 1) all other days   Full warfarin instructions:   5 mg every Mon, Fri; 2.5 mg all other days   Weekly warfarin total:   22.5 mg   No change documented:   Gianni Palumbo, RN   Plan last modified:   Gianni Palumbo, RN (2018)   Next INR check:   2019   Target end date:            Anticoagulation Episode Summary     INR check location:       Preferred lab:       Send INR reminders to:   JUAN CARLOS BRYAN    Comments:   2.5 mg tabs      Anticoagulation Care Providers     Provider Role Specialty Phone number    Dheeraj Jj MD [facility] Internal Medicine [phone]            See the Encounter Report to view Anticoagulation Flowsheet and Dosing Calendar (Go to Encounters tab in chart review, and find the Anticoagulation Therapy Visit)    Dosage adjustment made based on physician directed care  plan.    Gianni Palumbo RN

## 2019-01-10 NOTE — TELEPHONE ENCOUNTER
Reason for Call:  INR    Who is calling?  Patient called in with the home care RN with her    Phone number: [phone]     Name of caller: Kathryn    INR Value:  2.3     Are there any other concerns:  No, patient would like a call right away because the home care RN is waiting to set up her medication.     Can we leave a detailed message on this number? YES    Phone number patient can be reached at: Home number on file [phone] (home)      Call taken on 1/10/2019 at 11:57 AM by Christina Rodriguez

## 2019-01-11 NOTE — TELEPHONE ENCOUNTER
I have no received this form. Has anyone in the [facility] area? It is a form from [facility] that should say [facility] Home INR Monitoring [employer] Form  It should be pre-filled out, signed by PCP, faxed to the number on the form, then sent to scanning.     Zo Davis RN

## 2019-01-14 NOTE — TELEPHONE ENCOUNTER
Forms received from [facility]   on 1/14/19 for med rec.  Forms with RN to review medications.  Orders pended for provider to sign.    Forms given to PCP for signature on .

## 2019-01-15 NOTE — TELEPHONE ENCOUNTER
No fax received.   I have called [facility] [phone] (option 4). I spoke with Juanjose who said they have received it and they are in the process of getting it approved by insurance.    left for pt letting her know this  Zo Davis RN

## 2019-01-16 NOTE — TELEPHONE ENCOUNTER
Reason for Call:  Same Day Appointment, Requested Provider:  Dheeraj Jj MD    PCP: Dheeraj Jj    Reason for visit: Post Hospital check     Duration of symptoms:   Have you been treated for this in the past? Yes    Additional comments: Discharging from [facility] on 1/16/19 and needs a Post Hospital check in five days.     Can we leave a detailed message on this number? Unable to ask patient     Phone number patient can be reached at: Home number on file [phone] (home)    Best Time:      Call taken on 1/16/2019 at 11:26 AM by Crystal Hernandez

## 2019-01-16 NOTE — TELEPHONE ENCOUNTER
Med reconciliation completed by RN. Form given to PCP for signature.    Christina Hernandez RN

## 2019-01-16 NOTE — TELEPHONE ENCOUNTER
Patient called to schedule an appointment for a hospital follow-up or appeared on a report showing that they were recently discharged from the hospital.    Patient was admitted to [facility]  Discharged date: 1/16  Reason for hospital admission:  No discharge notes.  Make sure she gets them to us if you can.    Does patient have future appointment scheduled with provider? Yes   Date of future appointment:  01/21 Let her know that appt day and time.     This information will be used to help the care team plan for the patients upcoming visit.  The triage RN may determine that a follow up call is necessary and reach out to the patient via the phone number listed in the chart.     Please route this message on routine priority to the Triage RN pool.

## 2019-01-17 NOTE — TELEPHONE ENCOUNTER
Patient is currently being seen in ED. Postponing hospital f/u to tomorrow.     Kelly Hernandez RN on 1/17/2019 at 5:37 PM

## 2019-01-17 NOTE — ED TRIAGE NOTES
Discharged from [facility] with Pneumonia yesterday.  Noted some bruising to upper legs and it has increased today.

## 2019-01-17 NOTE — ED AVS SNAPSHOT
[facility] Emergency Department  [address] [place] [address]  Phone:  [phone]  Fax:  [phone]                                    Kathryn Banks   MRN: [id number]    Department:  [facility] Emergency Department   Date of Visit:  1/17/2019           After Visit Summary Signature Page    I have received my discharge instructions, and my questions have been answered. I have discussed any challenges I see with this plan with the nurse or doctor.    ..........................................................................................................................................  Patient/Patient Representative Signature      ..........................................................................................................................................  Patient Representative Print Name and Relationship to Patient    ..................................................               ................................................  Date                                   Time    ..........................................................................................................................................  Reviewed by Signature/Title    ...................................................              ..............................................  Date                                               Time          22EPIC Rev 08/18

## 2019-01-17 NOTE — ED PROVIDER NOTES
"  History     Chief Complaint   Patient presents with     Bleeding/Bruising     The history is provided by the patient.     Kathryn Banks is a 78 year old female who presents to the emergency department with concerns of bruising on her bilateral upper legs. The patient was recently admitted to [facility] for pneumonia for 3 days and was just discharged yesterday. After she arrived home, she noticed that she was starting to get a rash/bruising on her legs. She denies any fall or injury to that area of her legs. The bruising has worsened throughout the day today. She reports that her legs are \"tender\". She has never had a rash or bruising like this before. She has no history of DVT's or PE's. Patient notes that she is allergic to penicillins and was put on a new antibiotic while in the hospital. She is also currently on Warfarin and her INR has been well managed over the last few days while she was in the hospital. Patient reports that her pneumonia has improved.     Allergies:  Allergies   Allergen Reactions     Ciprofloxacin Nausea and Vomiting     Zofran did not help     Oxycodone Visual Disturbance     Delusions, blackouts      Lisinopril Cough     Milk Digestant [Lactase]      Stomach up set when drinks milk      Sulfa Drugs GI Disturbance     LOSS OF TASTE     Tape [Adhesive Tape]      Penicillins Other (See Comments)     Swelling and reddness at injection site, pt wanted it on        Problem List:    Patient Active Problem List    Diagnosis Date Noted     Long term current use of anticoagulant therapy 09/10/2018     Priority: Medium     Contusion of right hip, initial encounter 03/23/2018     Priority: Medium     ESRD (end stage renal disease) on dialysis (H) 11/06/2017     Priority: Medium     Physical deconditioning 11/06/2017     Priority: Medium     Bilateral leg edema 11/06/2017     Priority: Medium     DM type 2 -- Hgb A1C 6.6 on 10/13/17 10/29/2017     Priority: Medium     SBO -- S/P Lysis of " Adhes 10/24/17 10/22/2017     Priority: Medium     Paroxysmal atrial fibrillation (H) 10/20/2017     Priority: Medium     Anxiety 10/20/2017     Priority: Medium     Anemia in chronic kidney disease 09/20/2017     Priority: Medium     Microscopic hematuria 06/21/2017     Priority: Medium     Acute on chronic renal failure -- Dialysis started 10/2017 06/12/2017     Priority: Medium     Memory loss 06/12/2017     Priority: Medium     Proteinuria 2.1 g/g Cr 06/02/2017     Priority: Medium     Chronic heart failure (H) with preserved EF 05/31/2017     Priority: Medium     Atrial flutter (H) - present 6/12 05/31/2017     Priority: Medium     Pulmonary hypertension (H) 05/24/2017     Priority: Medium     Postprocedural hypotension 03/09/2017     Priority: Medium     Fall 02/10/2017     Priority: Medium     Type 2 diabetes mellitus with complication (H) 02/03/2017     Priority: Medium     Fall off motorized mobility scooter, initial encounter 02/03/2017     Priority: Medium     Acute decompensated heart failure (H) 02/03/2017     Priority: Medium     Overview:   HFpEF with EF 60-65% by echo 2/4/17.       Essential hypertension with goal blood pressure less than 140/90 09/13/2016     Priority: Medium     Osteoarthritis of hip 04/29/2015     Priority: Medium     Lymphedema 11/10/2014     Priority: Medium     Health Care Home 01/20/2014     Priority: Medium     State Tier Level:  Tier 3  Status:  Closed  Care Coordinator:  Pinky Oden if needed in the future.     See Letters for [facility] Care Plan         CAD - NSTEMI, CABG x 5 in 2006, angio 2013 patent grafts except occluded graft to PL 02/04/2013     Priority: Medium     Hyperlipidemia LDL goal <100 10/31/2010     Priority: Medium     Kidney stone 12/29/2009     Priority: Medium     Morbid obesity (H) 05/14/2008     Priority: Medium     Esophageal reflux 03/28/2006     Priority: Medium     Lipoma of other skin and subcutaneous tissue 09/07/2004     Priority: Medium     ANABEL on  CPAP      Priority: Medium     Advanced directives, counseling/discussion 11/12/2012     Priority: Low     Advance Directive received and scanned. Click on Code in the patient header to view. 11/12/2012          History of peptic ulcer disease 10/17/2007     Priority: Low     Problem list name updated by automated process. Provider to review       Restless leg syndrome      Priority: Low        Past Medical History:    Past Medical History:   Diagnosis Date     Carpal tunnel syndrome      Coronary atherosclerosis of native coronary artery 2006     OBSTRUCTIVE SLEEP APNEA      Osteoarthrosis, unspecified whether generalized or localized, unspecified site      Other and combined forms of senile cataract 2000     Other and unspecified hyperlipidemia      Paroxysmal atrial fibrillation (H)      Renal failure      RESTLESS LEGS SYNDROME      TENOSYNOV HAND/WRIST NEC 6/30/2006     Type II or unspecified type diabetes mellitus without mention of complication, not stated as uncontrolled 2001     Typical atrial flutter (H) 01/17/2007     Unspecified essential hypertension        Past Surgical History:    Past Surgical History:   Procedure Laterality Date     ANGIOPLASTY       C APPENDECTOMY       C CABG, VEIN, FIVE  12/06    SVG x 4 and LIMA to LAD     C SOTO W/O FACETEC FORAMOT/DSKC 1/2 VRT SEG, LUMBAR  1968     C REMV CATARACT INTRACAP,INSERT LENS      Bilateral     C TOTAL ABDOM HYSTERECTOMY  1995     - fibroids     C VAGOTOMY/PYLOROPLASTY,SELECT  1970     COLONOSCOPY  12/3/2007     COLONOSCOPY  1/17/2014    Procedure: COLONOSCOPY;  Colonoscopy;  Surgeon: Zack Stearns MD;  Location: [facility]     CREATE GRAFT LOOP ARTERIOVENOUS UPPER EXTREMITY Left 1/9/2018    Procedure: CREATE GRAFT ARTERIOVENOUS UPPER EXTREMITY;  Left Upper Arm Arteriovenous Graft Creation, Anesthesia Block ;  Surgeon: Cassie Cardenas MD;  Location: [facility]     CYSTOSCOPY  11/25/09    [facility]     ENDOSCOPY  03/21/2000    Upper GI     HC COLONOSCOPY THRU  STOMA, DIAGNOSTIC  10/7/04    poor prep, repeat in 2-3 years     HC COLONOSCOPY W/WO BRUSH/WASH  10/31/07    Repeat-poor quality prep     HC REMOVAL OF OVARY/TUBE(S)      Salpingo-Oophorectomy, Unilateral     HC REVISE MEDIAN N/CARPAL TUNNEL SURG      Carpal tunnel release     HC UGI ENDOSCOPY DIAG W BIOPSY  10/31/07     HC UGI ENDOSCOPY, SIMPLE EXAM  12/3/2007     LAPAROTOMY, LYSIS ADHESIONS, COMBINED N/A 10/24/2017    Procedure: COMBINED LAPAROTOMY, LYSIS ADHESIONS;  REPAIR FOCAL ILEAL SMALL BOWEL PERFERATION, LYSIS OF ADHESIONS.;  Surgeon: Rashard Zafar MD;  Location: [facility] OR     OPEN REDUCTION INTERNAL FIXATION HIP NAILING Left 7/3/2016    Procedure: OPEN REDUCTION INTERNAL FIXATION HIP NAILING;  Surgeon: Lake Schulz MD;  Location:  OR       Family History:    Family History   Problem Relation Age of Onset     Diabetes Father         AODM     Neurologic Disorder Father         Parkinson's     Blood Disease Father          from blood clot from leg to lung immediately after hip fracture     Diabetes Paternal Grandmother         adult onset     Diabetes Maternal Grandmother         adult onset     Hypertension No family hx of      Cerebrovascular Disease No family hx of        Social History:  Marital Status:   [2]  Social History     Tobacco Use     Smoking status: Former Smoker     Years: 10.00     Last attempt to quit: 1969     Years since quittin.0     Smokeless tobacco: Never Used   Substance Use Topics     Alcohol use: Yes     Alcohol/week: 0.0 oz     Comment: couple times a week     Drug use: No        Medications:      ACETAMINOPHEN PO   B Complex-C-Folic Acid (RENAL) 1 MG CAPS   calcium acetate (PHOSLO) 667 MG CAPS capsule   diltiazem ER COATED BEADS (CARDIZEM CD/CARTIA XT) 180 MG 24 hr capsule   loperamide (IMODIUM) 2 MG capsule   metoprolol tartrate (LOPRESSOR) 25 MG tablet   NEPHROCAPS (TRIPHROCAPS) 1 MG capsule   nitroGLYcerin (NITROSTAT) 0.4 MG sublingual tablet    pentoxifylline (TRENTAL) 400 MG CR tablet   pramipexole (MIRAPEX) 0.125 MG tablet   pravastatin (PRAVACHOL) 40 MG tablet   senna-docusate (SENOKOT-S;PERICOLACE) 8.6-50 MG per tablet   warfarin (COUMADIN) 2.5 MG tablet         Review of Systems   All other systems reviewed and are negative.      Physical Exam   BP: 110/67  Pulse: 71  Heart Rate: 82  Temp: 98.1  F (36.7  C)  Resp: 18  Weight: 56.2 kg (124 lb)  SpO2: 95 %      Physical Exam   Constitutional: She is oriented to person, place, and time. No distress.   HENT:   Head: Normocephalic and atraumatic.   Right Ear: External ear normal.   Left Ear: External ear normal.   Nose: Nose normal.   Mouth/Throat: Oropharynx is clear and moist. No oropharyngeal exudate.   Eyes: Conjunctivae and EOM are normal. Pupils are equal, round, and reactive to light. Right eye exhibits no discharge. Left eye exhibits no discharge. No scleral icterus.   Neck: Normal range of motion. Neck supple. No thyromegaly present.   Cardiovascular: Normal rate, regular rhythm and normal heart sounds.   No murmur heard.  Pulmonary/Chest: Effort normal and breath sounds normal. No respiratory distress. She has no wheezes. She has no rales. She exhibits no tenderness.   Abdominal: Soft. Bowel sounds are normal. She exhibits no distension and no mass. There is no tenderness. There is no rebound and no guarding.   Musculoskeletal: Normal range of motion. She exhibits no edema, tenderness or deformity.   No calf tenderness.  No palpable cord.  Negative Homans sign.   Lymphadenopathy:     She has no cervical adenopathy.   Neurological: She is alert and oriented to person, place, and time. She displays normal reflexes. No cranial nerve deficit. She exhibits normal muscle tone. Coordination normal.   Skin: Skin is warm and dry. Capillary refill takes less than 2 seconds. Rash (Areas of bruising to the medial thighs as noted in the picture from the groin down to the mid thigh.  No palpable hematoma.   No palpable cord.  Tender to palpation over the bruised areas.  She also has some bruising in the lower legs as well below the knee) noted. She is not diaphoretic. No erythema.   Trunk and upper extremities without rash.  No hives.  Some bruising from her previous IV sites on the right upper extremity.  Venous fistula noted in the left arm.   Psychiatric: She has a normal mood and affect. Her behavior is normal. Thought content normal.   Nursing note and vitals reviewed.             ED Course        Procedures               Critical Care time:  none               Results for orders placed or performed during the hospital encounter of 01/17/19 (from the past 24 hour(s))   CBC with platelets differential   Result Value Ref Range    WBC 7.4 4.0 - 11.0 10e9/L    RBC Count 4.80 3.8 - 5.2 10e12/L    Hemoglobin 12.8 11.7 - 15.7 g/dL    Hematocrit 42.6 35.0 - 47.0 %    MCV 89 78 - 100 fl    MCH 26.7 26.5 - 33.0 pg    MCHC 30.0 (L) 31.5 - 36.5 g/dL    RDW 19.7 (H) 10.0 - 15.0 %    Platelet Count 213 150 - 450 10e9/L    Diff Method Automated Method     % Neutrophils 64.1 %    % Lymphocytes 25.4 %    % Monocytes 8.4 %    % Eosinophils 0.7 %    % Basophils 0.5 %    % Immature Granulocytes 0.9 %    Nucleated RBCs 0 0 /100    Absolute Neutrophil 4.7 1.6 - 8.3 10e9/L    Absolute Lymphocytes 1.9 0.8 - 5.3 10e9/L    Absolute Monocytes 0.6 0.0 - 1.3 10e9/L    Absolute Basophils 0.0 0.0 - 0.2 10e9/L    Abs Immature Granulocytes 0.1 0 - 0.4 10e9/L    Absolute Nucleated RBC 0.0    Basic metabolic panel   Result Value Ref Range    Sodium 140 133 - 144 mmol/L    Potassium 3.3 (L) 3.4 - 5.3 mmol/L    Chloride 104 94 - 109 mmol/L    Carbon Dioxide 27 20 - 32 mmol/L    Anion Gap 9 3 - 14 mmol/L    Glucose 132 (H) 70 - 99 mg/dL    Urea Nitrogen 16 7 - 30 mg/dL    Creatinine 3.46 (H) 0.52 - 1.04 mg/dL    GFR Estimate 12 (L) >60 mL/min/[1.73_m2]    GFR Estimate If Black 14 (L) >60 mL/min/[1.73_m2]    Calcium 7.8 (L) 8.5 - 10.1 mg/dL   INR    Result Value Ref Range    INR 4.10 (H) 0.86 - 1.14     *Note: Due to a large number of results and/or encounters for the requested time period, some results have not been displayed. A complete set of results can be found in Results Review.       Medications - No data to display    Assessments & Plan (with Medical Decision Making)     Superficial bruising of lower leg  Supratherapeutic INR     78 year old female with a history of end-stage renal disease currently under dialysis 3 times per week on warfarin prophylaxis who presents for evaluation of a rash to her bilateral medial thighs starting this morning.  She was concerned, as she just started doxycycline for pneumonia during inpatient evaluation at [facility].  She was treated with vancomycin, Zosyn, and ceftriaxone initially.  Then was started on p.o. doxycycline.  She is concerned that she was may be experiencing a drug allergy.  She denies any itching of the rash.  Denies any dyspnea, wheezing, throat swelling, difficulty swallowing, or chest pain.  No trauma to the lower extremities.  No recent falls.  She states that she continues to take her warfarin on a regular basis, and has not missed any doses.  She reports her INR being checked on a regular basis when she was inpatient.  She just returned home from her 3-day hospital stay yesterday.  On exam blood pressure 120/60, pulse 81, temperature 98.1, and oxygen saturation 98% on room air.  Patient appears comfortable.  No acute distress.  She has what appears to be bruising to the medial thighs as noted above.  Some bruising in the lower extremities bilateral.  No sign of venous thromboembolism.  Symptoms are bilateral.  No trunk or upper extremity lesions.  Remainder the exam normal.  Labs obtained and confirmed stable hemoglobin at 12.8.  White blood cell count normal at 7400.  Platelet level normal at 213,000.  Basic metabolic panel with elevated creatinine due to her end-stage renal disease and need  for upcoming dialysis tomorrow.  Calcium low at 7.8.  Potassium borderline at 3.3.  INR supratherapeutic at 4.10.  I am concerned that her antibiotics have created her supratherapeutic INR.  Her exam is suggestive of bruising, and certainly an elevated INR could contribute to that.  She does not have any other signs of bleeding.  No epistaxis, hematemesis, or lower GI bleeding.  No significant pain anywhere in the body.  Therefore, I do not think that this needs to be acutely reversed.  I am going to have her hold her warfarin tonight and tomorrow night and then resume 2.5 mg daily after that.  This was written in her discharge instructions.  Patient actually has an INR glory at home, and she is going to test this on Sunday, 3.5 days from now.  Discussed that she needs to return to the ED immediately if she has any signs of bleeding or other abnormalities that could be suggestive of internal bleeding.  She will follow-up with the Coumadin clinic on Monday regardless with how she is doing.  Her INR will need to be watched very closely while she is working through the antibiotic therapy for her pneumonia.  Patient was in agreement with this plan and was suitable for discharge.     I have reviewed the nursing notes.    I have reviewed the findings, diagnosis, plan and need for follow up with the patient.          Medication List      There are no discharge medications for this visit.         Final diagnoses:   Superficial bruising of lower leg   Supratherapeutic INR     This document serves as a record of services personally performed by Vivek Cordero PA-C. It was created on their behalf by Karma Bullard, a trained medical scribe. The creation of this record is based on the provider's personal observations and the statements of the patient. This document has been checked and approved by the attending provider.  Note: Chart documentation done in part with Dragon Voice Recognition software. Although reviewed after  completion, some word and grammatical errors may remain.    1/17/2019   Vivek Cordero PA-C   [facility] EMERGENCY DEPARTMENT     Vivek Cordero PA-C  01/17/19 [id number]

## 2019-01-18 NOTE — DISCHARGE INSTRUCTIONS
It was a pleasure working with you today!  I hope your condition improves rapidly!     Please HOLD your Warfarin tonight, and tomorrow night.  Continue your Warfarin dose of 2.5 mg on Saturday.  Recheck an INR on Sunday. Call the on call doctor for advice regarding the level or come to the ED if you have other questions.  Please plan on following up with the Coumadin Clinic on Monday as well.    Please return to the ED, and consider calling [phone], if you have any signs of bleeding.

## 2019-01-18 NOTE — TELEPHONE ENCOUNTER
"ED / Discharge Outreach Protocol    Patient Contact    Attempt # 1    Was call answered?  Yes.  \"May I please speak with Kathryn\"  Is patient available?   No. Left message with  for patient to call me back.    Arlet Lee, MARTYN, RN    "

## 2019-01-21 NOTE — PROGRESS NOTES
SUBJECTIVE:   Kathryn Banks is a 78 year old female who presents to clinic today for the following health issues:    Hospital Follow-up Visit:    Hospital/Nursing Home/IP Rehab Facility: [facility]  Date of Admission: 1/13/19  Date of Discharge: 1/16/19  Reason(s) for Admission: Moraxella catarrhalis pneumonia  Atrial Fiab            Problems taking medications regularly:  None       Medication changes since discharge: None       Problems adhering to non-medication therapy:  None    Summary of hospitalization:  CareEverywhere information obtained and reviewed  Discharge summary unavailable  Diagnostic Tests/Treatments reviewed.  Follow up needed: none  Other Healthcare Providers Involved in Patient s Care:         dialysis   Update since discharge: improved.     Post Discharge Medication Reconciliation: discharge medications reconciled and changed, per note/orders (see AVS).  Plan of care communicated with patient and family        She has been home for 5 days. Daughter is with her today.     Pneumonia and doing better, less cough and no fevers. Still has antibiotics to take.no pains, some cough.      Eating some but losing weight.        Dialysis on M,W, F.      Now off amiodarone and on diltiazem.     INR went up to 4, had bruising on her legs, some tenderness.  CBC was normal at the ER on the 17th.      Past Medical History:   Diagnosis Date     Carpal tunnel syndrome      Coronary atherosclerosis of native coronary artery 2006    5 vessel CABG     OBSTRUCTIVE SLEEP APNEA     using CPAP     Osteoarthrosis, unspecified whether generalized or localized, unspecified site     generalized arthritis, particularly in her hands and feet         Other and combined forms of senile cataract 2000    Bilateral     Other and unspecified hyperlipidemia      Paroxysmal atrial fibrillation (H)      Renal failure     on hemodialysis     RESTLESS LEGS SYNDROME      TENOSYNOV HAND/WRIST NEC 6/30/2006     Type II or unspecified type  "diabetes mellitus without mention of complication, not stated as uncontrolled     Diabetes mellitus/pt is diet controlled with weight loss     Typical atrial flutter (H) 2007    ablation 2017     Unspecified essential hypertension      Current Outpatient Medications   Medication     ACETAMINOPHEN PO     B Complex-C-Folic Acid (RENAL) 1 MG CAPS     calcium acetate (PHOSLO) 667 MG CAPS capsule     diltiazem ER COATED BEADS (CARDIZEM CD/CARTIA XT) 180 MG 24 hr capsule     loperamide (IMODIUM) 2 MG capsule     metoprolol tartrate (LOPRESSOR) 25 MG tablet     NEPHROCAPS (TRIPHROCAPS) 1 MG capsule     pentoxifylline (TRENTAL) 400 MG CR tablet     pramipexole (MIRAPEX) 0.125 MG tablet     pravastatin (PRAVACHOL) 40 MG tablet     senna-docusate (SENOKOT-S;PERICOLACE) 8.6-50 MG per tablet     warfarin (COUMADIN) 2.5 MG tablet     nitroGLYcerin (NITROSTAT) 0.4 MG sublingual tablet     No current facility-administered medications for this visit.      Social History     Tobacco Use     Smoking status: Former Smoker     Years: 10.00     Last attempt to quit: 1969     Years since quittin.0     Smokeless tobacco: Never Used   Substance Use Topics     Alcohol use: Yes     Alcohol/week: 0.0 oz     Comment: couple times a week     Drug use: No     Review of Systems  Constitutional-Weight loss.  ENT-No earpain, sore throat, voice changes or rhinitis.  Cardiac-No chest pain or palpitations.  Respiratory-some cough-improving.  GI-No nausea, vomitting, diarrhea, constipation, or blood in the stool.  Musculoskeletal-chronic issues. .    Physical Exam  /52   Pulse 74   Temp 97.5  F (36.4  C) (Temporal)   Resp 16   Ht 1.626 m (5' 4\")   Wt 58.1 kg (128 lb)   SpO2 98%   BMI 21.97 kg/m    General Appearance-alert, mild distress, weak in wheelchair  Cardiac-regular rate and rhythm  with normal S1, S2 ; no murmur, rub or gallops  Lungs-clear to auscultation and mild expiratory rhonchi  Skin-bilateral upper " legs and the thighs have mild erythema which she says is improved       aSSESSMENT:  This is a 78-year-old woman that I know well, she has had multiple different disorders including some diabetes diet controlled, chronic renal failure on dialysis.  She has had progressive weakness due to her multiple medical issues.  She has paroxysmal atrial fibrillation and is on anticoagulation of Coumadin.  Unfortunately last week she was in with a pneumonia worse on the left than the right lower lobe, she was treated with antibiotics and has 3-4 more days of this.  Her fever and cough are improving, she is afebrile, oxygen saturation is 98% on room air today.  She still has some coarse breath sounds but otherwise her lungs are clear and she is moving air.  We could repeat an x-ray will have to wait 4 weeks so we will hold off on this.    There was concerned about some bleeding and said some bruising up in her upper legs this is better, she was seen in the emergency room this weekend and her INR was 4, we will repeat her INR today is am sure this is up with her antibiotics.  She did hold her Coumadin with the elevated INR.    Chronic kidney failure she does have dialysis 3 days a week has to limit how much she is drinking.  I also discussed her weight loss and need for nutrition.  She will try to get for 3 meals a day and can have some fluids with that.    Electronically signed by Dheeraj Jj MD

## 2019-01-21 NOTE — TELEPHONE ENCOUNTER
Reason for Call:  Other     Detailed comments: Patient has an appointment to see you today.  She won't be home for the home care assessment.  Argenis is asking that you ok to move the visit to tomorrow please.    Phone Number Patient can be reached at:     Best Time:     Can we leave a detailed message on this number? YES    Call taken on 1/21/2019 at 1:33 PM by Cristian Ortiz

## 2019-01-21 NOTE — TELEPHONE ENCOUNTER
I have attempted to contact this patient by phone, spoke with spouse, she is not home yet. Will try back again around 4.   Need to verify last 7 days dosing, from her chart it looks like she had 5 mg Mon, 2.5 mg tu, wed, sat, sun and doses held on TH, F due to bruising (ED visit).    Nicki Calvillo, ROSARIO- Coumadin Clinic RN

## 2019-01-21 NOTE — PROGRESS NOTES
ANTICOAGULATION FOLLOW-UP CLINIC VISIT    Patient Name:  Kathryn Banks  Date:  2019  Contact Type:  Telephone    SUBJECTIVE:     Patient Findings     Positives:   Antibiotic use or infection (Pt on abx doxy for pnuemonia, he INR was up in the ED after having some bruising to her legs. she held 2 days. )    Comments:   Home care should be out this week and will do INR, if not ki advised that she come into clinic for f/u INR.      Has a couple days left of abx     OBJECTIVE    INR Point of Care   Date Value Ref Range Status   2019 1.9 (H) 0.86 - 1.14 Final     Comment:     This test is intended for monitoring Coumadin therapy.  Results are not   accurate in patients with prolonged INR due to factor deficiency.         ASSESSMENT / PLAN  INR assessment THER    Recheck INR In: 4 DAYS    INR Location Outside lab      Anticoagulation Summary  As of 2019    INR goal:   2.0-3.0   TTR:   43.8 % (4.3 mo)   INR used for dosin.9! (2019)   Warfarin maintenance plan:   5 mg (2.5 mg x 2) every Mon, Fri; 2.5 mg (2.5 mg x 1) all other days   Full warfarin instructions:   5 mg every Mon, Fri; 2.5 mg all other days   Weekly warfarin total:   22.5 mg   Plan last modified:   Gianni Palumbo RN (2018)   Next INR check:   2019   Target end date:            Anticoagulation Episode Summary     INR check location:       Preferred lab:       Send INR reminders to:   JUAN CARLOS BRYAN    Comments:   2.5 mg tabs      Anticoagulation Care Providers     Provider Role Specialty Phone number    Dheeraj Jj MD [facility] Internal Medicine [phone]            See the Encounter Report to view Anticoagulation Flowsheet and Dosing Calendar (Go to Encounters tab in chart review, and find the Anticoagulation Therapy Visit)    Dosage adjustment made based on physician directed care plan.    Nicki Calvillo RN
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
## 2019-01-22 NOTE — PATIENT INSTRUCTIONS
TODAY'S RECOMMENDATIONS    1. Zio Patch for 7 days    2. If heart rate is controlled and atrial fibrillation in minimal, no need to return to review results. We will call.    3. Follow up with Dr. King in 9 months    If you have questions or concerns please call clinic at [phone].    Please call [phone] for scheduling.      It was a pleasure seeing you today!
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
## 2019-01-23 NOTE — PROGRESS NOTES
FYI: Pt NOT admitted to home care services today. She is returning back to baseline. Her VSS, T 97.0, P 80, R 16, O2 95 percent room air, /60, LS CTAB. Her INR is due on Friday, and she will check that with her own INR machine and call it in to INR Nurse. She has 2 abx doses left for pneumonia. She states she is feeling better and breathing is unlabored. There is no skilled need at this time for nursing. Pt denies need for PT/OT/HHA. Pt managing her meds on her own. Pt is not completely homebound and is able to get out to go grocery shopping as well, with friend's assistance. Pt verbally understands to call MD and or home care if a new need/concern arises.     Thank you,  Please reach out if you have any concerns or questions,  Dagmar Pérez RN  [phone]

## 2019-02-15 NOTE — TELEPHONE ENCOUNTER
"pentoxifylline  Last Written Prescription Date:  7/20/2018  Last Fill Quantity: 90,  # refills: 0   Last office visit: 1/21/2019 with prescribing provider:      Future Office Visit:      Requested Prescriptions   Pending Prescriptions Disp Refills     pentoxifylline ER (TRENTAL) 400 MG CR tablet [Pharmacy Med Name: PENTOXIFYLLINE ER 400MG TAB] 90 tablet 0     Sig: TAKE 1 TABLET BY MOUTH ONCE DAILY    Platelet Inhibitors Failed - 2/14/2019 12:51 PM       Failed - Normal serum creatinine on file in past 12 months    Recent Labs   Lab Test 01/17/19  1758   CR 3.46*          Passed - Normal ALT on file in past 12 months    Recent Labs   Lab Test 01/07/19  1310   ALT 15          Passed - Normal HGB on file in past 12 months    Recent Labs   Lab Test 01/17/19  1758   HGB 12.8          Passed - Normal AST on file in past 12 months    Recent Labs   Lab Test 01/07/19  1310   AST 27          Passed - Normal Platelets on file in past 12 months    Recent Labs   Lab Test 01/17/19  1758             Passed - Recent (12 mo) or future (30 days) visit within the authorizing provider's specialty    Patient had office visit in the last 12 months or has a visit in the next 30 days with authorizing provider or within the authorizing provider's specialty.  See \"Patient Info\" tab in inbasket, or \"Choose Columns\" in Meds & Orders section of the refill encounter.           Passed - Medication is active on med list       Passed - Patient is age 18 or older       Passed - No active pregnancy on record       Passed - No positive pregnancy test in past 12 months          Routing refill request to provider for review/approval because:  Labs out of range:  Cr 3.46  Christina Hernandez RN on 2/15/2019 at 3:03 PM    "

## 2019-02-25 PROBLEM — J18.9 PNEUMONIA: Status: ACTIVE | Noted: 2019-01-01

## 2019-02-25 PROBLEM — S32.402A CLOSED FRACTURE OF LEFT ACETABULUM (H): Status: ACTIVE | Noted: 2017-02-03

## 2019-02-25 PROBLEM — J15.69: Status: ACTIVE | Noted: 2019-01-01

## 2019-02-25 PROBLEM — Z91.148 NONCOMPLIANCE WITH MEDICATIONS: Status: ACTIVE | Noted: 2018-06-08

## 2019-02-25 PROBLEM — J94.8 HYDROPNEUMOTHORAX: Status: ACTIVE | Noted: 2018-03-26

## 2019-02-25 PROBLEM — N18.6 END-STAGE RENAL DISEASE ON HEMODIALYSIS (H): Status: ACTIVE | Noted: 2017-12-15

## 2019-02-25 PROBLEM — S72.002G CLOSED FRACTURE OF LEFT HIP WITH DELAYED HEALING: Status: ACTIVE | Noted: 2017-03-14

## 2019-02-25 PROBLEM — R06.02 SHORTNESS OF BREATH: Status: ACTIVE | Noted: 2018-01-01

## 2019-02-25 PROBLEM — S32.599A CLOSED FRACTURE OF MULTIPLE PUBIC RAMI (H): Status: ACTIVE | Noted: 2018-03-23

## 2019-02-25 PROBLEM — S42.035A CLOSED NONDISPLACED FRACTURE OF ACROMIAL END OF LEFT CLAVICLE: Status: ACTIVE | Noted: 2017-02-03

## 2019-02-25 PROBLEM — N17.0 ACUTE RENAL FAILURE WITH TUBULAR NECROSIS (H): Status: ACTIVE | Noted: 2017-02-03

## 2019-02-25 PROBLEM — I50.9 CHF (CONGESTIVE HEART FAILURE) (H): Status: ACTIVE | Noted: 2018-01-01

## 2019-02-25 PROBLEM — Z99.2 END-STAGE RENAL DISEASE ON HEMODIALYSIS (H): Status: ACTIVE | Noted: 2017-12-15

## 2019-02-25 PROBLEM — Z79.01 ANTICOAGULATED ON COUMADIN: Status: ACTIVE | Noted: 2017-02-03

## 2019-02-25 NOTE — ED NOTES
Patient being paced on EMS Monitor.  Decision made to leave patient on EMS Monitor (LP15) due transfer likeliness and unsure of transfer mode.  Patient would remain on EMS LP15 Pacing during entire ED stay until transferred to [facility].

## 2019-02-25 NOTE — ED PROVIDER NOTES
History     Chief Complaint   Patient presents with     Bradycardia     Code blue called.      HPI  Kathryn Banks is a 78 year old female who presents via EMS code 3 because of severe bradycardia and undetermined heart rate.  Patient is a known dialysis patient who gets dialyzed on Mondays, Wednesdays and Fridays.  EMS was called to the patient's house by the patient's dialysis .  When the dialysis  arrived the patient was sitting on her toilet, very diaphoretic and not feeling well.  When EMS arrived they put the pacer pads on her and noted her to have a heart rate between 10 and 20.  Patient then began posturing and seem like she was about to code.  They then immediately placed the pacer pads on the patient and started external pacing.  They were able to capture and brought the patient here code 3.  In route the patient was talking and answering questions appropriately.  Right before getting here she started to complain about wanting to have the pacer pads removed.  EMS then gave HER-2 of Versed.  Patient was merely brought into a critical bay room and upon my arrival patient was very obtunded and not answering questions.  She had an absent gag reflex.    Allergies:  Allergies   Allergen Reactions     Ciprofloxacin Nausea and Vomiting     Zofran did not help     Oxycodone Visual Disturbance     Delusions, blackouts      Lisinopril Cough     Milk Digestant [Lactase]      Stomach up set when drinks milk      Sulfa Drugs GI Disturbance     LOSS OF TASTE     Tape [Adhesive Tape]      Penicillins Other (See Comments)     Swelling and reddness at injection site, pt wanted it on        Problem List:    Patient Active Problem List    Diagnosis Date Noted     Moraxella catarrhalis pneumonia (H) 01/16/2019     Priority: Medium     Pneumonia 01/13/2019     Priority: Medium     CHF (congestive heart failure) (H) 11/19/2018     Priority: Medium     Shortness of breath 11/19/2018     Priority: Medium     Long  term current use of anticoagulant therapy 09/10/2018     Priority: Medium     Noncompliance with medications 06/08/2018     Priority: Medium     Hydropneumothorax 03/26/2018     Priority: Medium     Contusion of right hip, initial encounter 03/23/2018     Priority: Medium     Closed fracture of multiple pubic rami (H) 03/23/2018     Priority: Medium     End-stage renal disease on hemodialysis (H) 12/15/2017     Priority: Medium     Overview:   [facility] under Dr Dhillon       ESRD (end stage renal disease) on dialysis (H) 11/06/2017     Priority: Medium     Physical deconditioning 11/06/2017     Priority: Medium     Bilateral leg edema 11/06/2017     Priority: Medium     DM type 2 -- Hgb A1C 6.6 on 10/13/17 10/29/2017     Priority: Medium     SBO -- S/P Lysis of Adhes 10/24/17 10/22/2017     Priority: Medium     Paroxysmal atrial fibrillation (H) 10/20/2017     Priority: Medium     Anxiety 10/20/2017     Priority: Medium     Anemia in chronic kidney disease 09/20/2017     Priority: Medium     Microscopic hematuria 06/21/2017     Priority: Medium     Acute on chronic renal failure -- Dialysis started 10/2017 06/12/2017     Priority: Medium     Memory loss 06/12/2017     Priority: Medium     Proteinuria 2.1 g/g Cr 06/02/2017     Priority: Medium     Chronic heart failure (H) with preserved EF 05/31/2017     Priority: Medium     Atrial flutter (H) - present 6/12 05/31/2017     Priority: Medium     Pulmonary hypertension (H) 05/24/2017     Priority: Medium     Closed fracture of left hip with delayed healing 03/14/2017     Priority: Medium     Postprocedural hypotension 03/09/2017     Priority: Medium     Fall 02/10/2017     Priority: Medium     Type 2 diabetes mellitus with complication (H) 02/03/2017     Priority: Medium     Fall off motorized mobility scooter, initial encounter 02/03/2017     Priority: Medium     Acute decompensated heart failure (H) 02/03/2017     Priority: Medium     Overview:   HFpEF with  EF 60-65% by echo 2/4/17.       Acute renal failure with tubular necrosis (H) 02/03/2017     Priority: Medium     Anticoagulated on Coumadin 02/03/2017     Priority: Medium     Closed fracture of left acetabulum (H) 02/03/2017     Priority: Medium     Closed nondisplaced fracture of acromial end of left clavicle 02/03/2017     Priority: Medium     Essential hypertension with goal blood pressure less than 140/90 09/13/2016     Priority: Medium     Sepsis (H) 07/02/2016     Priority: Medium     Osteoarthritis of hip 04/29/2015     Priority: Medium     Lymphedema 11/10/2014     Priority: Medium     Health Care Home 01/20/2014     Priority: Medium     State Tier Level:  Tier 3  Status:  Closed  Care Coordinator:  Pinky Oden if needed in the future.     See Letters for [facility] Care Plan         CAD - NSTEMI, CABG x 5 in 2006, angio 2013 patent grafts except occluded graft to PL 02/04/2013     Priority: Medium     Hyperlipidemia LDL goal <100 10/31/2010     Priority: Medium     Kidney stone 12/29/2009     Priority: Medium     Morbid obesity (H) 05/14/2008     Priority: Medium     Esophageal reflux 03/28/2006     Priority: Medium     Lipoma of other skin and subcutaneous tissue 09/07/2004     Priority: Medium     Allergic rhinitis 06/07/2003     Priority: Medium     ANABEL on CPAP      Priority: Medium     Advanced directives, counseling/discussion 11/12/2012     Priority: Low     Advance Directive received and scanned. Click on Code in the patient header to view. 11/12/2012          History of peptic ulcer disease 10/17/2007     Priority: Low     Problem list name updated by automated process. Provider to review       Restless leg syndrome      Priority: Low        Past Medical History:    Past Medical History:   Diagnosis Date     Carpal tunnel syndrome      Coronary atherosclerosis of native coronary artery 2006     OBSTRUCTIVE SLEEP APNEA      Osteoarthrosis, unspecified whether generalized or localized, unspecified site       Other and combined forms of senile cataract 2000     Other and unspecified hyperlipidemia      Paroxysmal atrial fibrillation (H)      Renal failure      RESTLESS LEGS SYNDROME      TENOSYNOV HAND/WRIST NEC 6/30/2006     Type II or unspecified type diabetes mellitus without mention of complication, not stated as uncontrolled 2001     Typical atrial flutter (H) 01/17/2007     Unspecified essential hypertension        Past Surgical History:    Past Surgical History:   Procedure Laterality Date     ANGIOPLASTY       C APPENDECTOMY       C CABG, VEIN, FIVE  12/06    SVG x 4 and LIMA to LAD     C SOTO W/O FACETEC FORAMOT/DSKC 1/2 VRT SEG, LUMBAR  1968     C REMV CATARACT INTRACAP,INSERT LENS      Bilateral     C TOTAL ABDOM HYSTERECTOMY  1995     - fibroids     C VAGOTOMY/PYLOROPLASTY,SELECT  1970     COLONOSCOPY  12/3/2007     COLONOSCOPY  1/17/2014    Procedure: COLONOSCOPY;  Colonoscopy;  Surgeon: Zack Stearns MD;  Location: [facility]     CREATE GRAFT LOOP ARTERIOVENOUS UPPER EXTREMITY Left 1/9/2018    Procedure: CREATE GRAFT ARTERIOVENOUS UPPER EXTREMITY;  Left Upper Arm Arteriovenous Graft Creation, Anesthesia Block ;  Surgeon: Cassie Cardenas MD;  Location: [facility]     CYSTOSCOPY  11/25/09    [facility]     ENDOSCOPY  03/21/2000    Upper GI     HC COLONOSCOPY THRU STOMA, DIAGNOSTIC  10/7/04    poor prep, repeat in 2-3 years     HC COLONOSCOPY W/WO BRUSH/WASH  10/31/07    Repeat-poor quality prep     HC REMOVAL OF OVARY/TUBE(S)      Salpingo-Oophorectomy, Unilateral     HC REVISE MEDIAN N/CARPAL TUNNEL SURG      Carpal tunnel release     HC UGI ENDOSCOPY DIAG W BIOPSY  10/31/07     HC UGI ENDOSCOPY, SIMPLE EXAM  12/3/2007     LAPAROTOMY, LYSIS ADHESIONS, COMBINED N/A 10/24/2017    Procedure: COMBINED LAPAROTOMY, LYSIS ADHESIONS;  REPAIR FOCAL ILEAL SMALL BOWEL PERFERATION, LYSIS OF ADHESIONS.;  Surgeon: Rashard Zafar MD;  Location: [facility] OR     OPEN REDUCTION INTERNAL FIXATION HIP NAILING Left 7/3/2016     Procedure: OPEN REDUCTION INTERNAL FIXATION HIP NAILING;  Surgeon: Lake Schulz MD;  Location: [facility]       Family History:    Family History   Problem Relation Age of Onset     Diabetes Father         AODM     Neurologic Disorder Father         Parkinson's     Blood Disease Father          from blood clot from leg to lung immediately after hip fracture     Diabetes Paternal Grandmother         adult onset     Diabetes Maternal Grandmother         adult onset     Hypertension No family hx of      Cerebrovascular Disease No family hx of        Social History:  Marital Status:   [2]  Social History     Tobacco Use     Smoking status: Former Smoker     Years: 10.00     Last attempt to quit: 1969     Years since quittin.1     Smokeless tobacco: Never Used   Substance Use Topics     Alcohol use: Yes     Alcohol/week: 0.0 oz     Comment: couple times a week     Drug use: No        Medications:      ACETAMINOPHEN PO   B Complex-C-Folic Acid (RENAL) 1 MG CAPS   calcium acetate (PHOSLO) 667 MG CAPS capsule   diltiazem ER COATED BEADS (CARDIZEM CD/CARTIA XT) 240 MG 24 hr capsule   loperamide (IMODIUM) 2 MG capsule   metoprolol tartrate (LOPRESSOR) 25 MG tablet   NEPHROCAPS (TRIPHROCAPS) 1 MG capsule   nitroGLYcerin (NITROSTAT) 0.4 MG sublingual tablet   pentoxifylline ER (TRENTAL) 400 MG CR tablet   pramipexole (MIRAPEX) 0.125 MG tablet   pravastatin (PRAVACHOL) 40 MG tablet   senna-docusate (SENOKOT-S;PERICOLACE) 8.6-50 MG per tablet   warfarin (COUMADIN) 2.5 MG tablet         Review of Systems   Unable to perform ROS: Patient unresponsive       Physical Exam          Physical Exam   Constitutional: She appears well-developed and well-nourished.   HENT:   Head: Normocephalic and atraumatic.   Nose: Nose normal.   Eyes: Pupils are equal, round, and reactive to light.   Neck: No JVD present.   Cardiovascular: Intact distal pulses. Bradycardia present.   Pulmonary/Chest: Breath sounds normal. No  stridor. Apnea and bradypnea noted. She is in respiratory distress. She has no wheezes.   Abdominal: Soft. Bowel sounds are normal. She exhibits no distension. There is no tenderness. There is no guarding.   Musculoskeletal: She exhibits no deformity.   Neurological: She is unresponsive. GCS eye subscore is 1. GCS verbal subscore is 3. GCS motor subscore is 4.   Skin: Capillary refill takes 2 to 3 seconds. She is diaphoretic. There is pallor.   Nursing note and vitals reviewed.      ED Course        Intubation  Date/Time: 2/25/2019 6:39 AM  Performed by: Eyal Jeff MD  Authorized by: Eyal Jeff MD   Consent: Verbal consent obtained.  Indications: respiratory failure and  airway protection  Intubation method: direct  Patient status: paralyzed (RSI)  Sedatives: etomidate  Paralytic: rocuronium  Laryngoscope size: Mac 4  Tube size: 7.5 mm  Tube type: cuffed  Number of attempts: 1  Cords visualized: yes  Post-procedure assessment: chest rise and ETCO2 monitor  Breath sounds: equal  Cuff inflated: yes  ETT to lip: 25 cm  Tube secured with: ETT maki  Chest x-ray interpreted by me.  Chest x-ray findings: endotracheal tube in appropriate position  Patient tolerance: Patient tolerated the procedure well with no immediate complications              Critical Care time:  was 60 minutes for this patient excluding procedures.      Results for orders placed or performed during the hospital encounter of 02/25/19 (from the past 24 hour(s))   Blood gas venous   Result Value Ref Range    Ph Venous 7.21 (L) 7.32 - 7.43 pH    PCO2 Venous 49 40 - 50 mm Hg    PO2 Venous 32 25 - 47 mm Hg    Bicarbonate Venous 19 (L) 21 - 28 mmol/L    Base Deficit Venous 8.6 mmol/L   CBC with platelets differential   Result Value Ref Range    WBC 11.2 (H) 4.0 - 11.0 10e9/L    RBC Count 4.29 3.8 - 5.2 10e12/L    Hemoglobin 12.1 11.7 - 15.7 g/dL    Hematocrit 40.4 35.0 - 47.0 %    MCV 94 78 - 100 fl    MCH 28.2 26.5 - 33.0 pg     MCHC 30.0 (L) 31.5 - 36.5 g/dL    RDW 20.8 (H) 10.0 - 15.0 %    Platelet Count 184 150 - 450 10e9/L    Diff Method Automated Method     % Neutrophils 73.0 %    % Lymphocytes 14.0 %    % Monocytes 7.9 %    % Eosinophils 2.9 %    % Basophils 1.1 %    % Immature Granulocytes 1.1 %    Nucleated RBCs 0 0 /100    Absolute Neutrophil 8.2 1.6 - 8.3 10e9/L    Absolute Lymphocytes 1.6 0.8 - 5.3 10e9/L    Absolute Monocytes 0.9 0.0 - 1.3 10e9/L    Absolute Basophils 0.1 0.0 - 0.2 10e9/L    Abs Immature Granulocytes 0.1 0 - 0.4 10e9/L    Absolute Nucleated RBC 0.0    Comprehensive metabolic panel   Result Value Ref Range    Sodium 144 133 - 144 mmol/L    Potassium 6.3 (HH) 3.4 - 5.3 mmol/L    Chloride 114 (H) 94 - 109 mmol/L    Carbon Dioxide 19 (L) 20 - 32 mmol/L    Anion Gap 11 3 - 14 mmol/L    Glucose 219 (H) 70 - 99 mg/dL    Urea Nitrogen 22 7 - 30 mg/dL    Creatinine 3.50 (H) 0.52 - 1.04 mg/dL    GFR Estimate 12 (L) >60 mL/min/[1.73_m2]    GFR Estimate If Black 14 (L) >60 mL/min/[1.73_m2]    Calcium 7.7 (L) 8.5 - 10.1 mg/dL    Bilirubin Total 0.4 0.2 - 1.3 mg/dL    Albumin 2.1 (L) 3.4 - 5.0 g/dL    Protein Total 5.2 (L) 6.8 - 8.8 g/dL    Alkaline Phosphatase 248 (H) 40 - 150 U/L    ALT 28 0 - 50 U/L    AST 24 0 - 45 U/L   Calcium ionized   Result Value Ref Range    Calcium Ionized 4.2 (L) 4.4 - 5.2 mg/dL   Magnesium   Result Value Ref Range    Magnesium 2.2 1.6 - 2.3 mg/dL   Troponin I   Result Value Ref Range    Troponin I ES 0.364 (HH) 0.000 - 0.045 ug/L   XR Chest 1 View    Narrative    XR CHEST 1 VIEW   2/25/2019 6:01 AM     HISTORY: Intubation.    COMPARISON: 10/1/2018.    FINDINGS: Supine portable chest. An endotracheal tube has been placed  with tip 2.5 cm above the francisco. Nasogastric tube passes into the  stomach. No pneumothorax. There is a left pleural effusion.  Curvilinear probable scar in the right midlung.      Impression    IMPRESSION: Endotracheal tube 2.5 cm above the francisco.     *Note: Due to a large  number of results and/or encounters for the requested time period, some results have not been displayed. A complete set of results can be found in Results Review.       Medications   albuterol (PROVENTIL) (2.5 MG/3ML) 0.083% neb solution (not administered)   glucose gel 15-30 g (not administered)     Or   dextrose 50 % injection 25-50 mL (not administered)     Or   glucagon injection 1 mg (not administered)   dextrose 10% infusion (not administered)   insulin (regular) (HumuLIN R/NovoLIN R) injection 6 Units (not administered)   dextrose 50 % injection 25 g (not administered)     Patient was immediately brought to room 6, patient was noted to have a GCS less than 8 and because of this she was not protecting her airway.  Patient was immediately intubated as noted above.  This went well.  We got labs back and patient's potassium was high but not super high but we did initiate the hyperkalemia pathway.  Patient was given some glucose and insulin, a high dose albuterol neb was started and patient was given some calcium chloride.  Air care is here and patient will be transferred down to [facility] where she has been seen before and cardiology service is available for possible or probable pacemaker placement.  I spoke with the ER physician, Dr. Stokes there who will accept the patient in transfer.  Patient's troponin was also elevated but this could be secondary to the severe bradycardia and her dialysis.  Patient remained stable, blood pressure remained around 100 systolic at the time of transfer.  Patient remained stable and was safe for emergent transfer.    Assessments & Plan (with Medical Decision Making)  Bradycardia, respiratory arrest, hyperkalemia     I have reviewed the nursing notes.    I have reviewed the findings, diagnosis, plan and need for follow up with the patient.          Final diagnoses:   Bradycardia   Respiratory arrest (H)   Hyperkalemia       2/25/2019   [facility] EMERGENCY DEPARTMENT      Eyal Jeff MD  02/25/19 0615

## 2019-02-25 NOTE — ED TRIAGE NOTES
Pt arrived being paced. Unresponsive. Respiratory arrest. Code blue called. Air care called. Pt is a dialysis patient.

## 2019-02-25 NOTE — ED NOTES
Patient arrived on EMS cot with a GCS of 6, slow irregular respirations on nasal cannula.  OPA inserted, BVM Ventilations (attached to 100% oxygen).

## 2019-02-25 NOTE — ED NOTES
Obtaining continuous good quality SPO2 pleth difficult due to cold hands / poor circulation.  When good connection made - SPO2 would trend in high 90's% - 100%.

## 2019-02-25 NOTE — ED NOTES
Patient prepared for transport - wrapped in many blankets - torso - wrapped blanket around head for rewarming.  [facility] advised to warm patient.

## 2019-03-21 NOTE — TELEPHONE ENCOUNTER
[facility] Unsure if she return home, will discontinue from the [facility] at this time until we hear further.     Nicki Calvillo RN- [facility] RN

## 2019-04-01 NOTE — TELEPHONE ENCOUNTER
Reason for Call:  Other home oxygen     Detailed comments: Pt would like the home oxygen taken away. Pt is currently in the hospital and is fairly certain she will not be returning home. [facility].     Phone Number Patient's  can be reached at: Home number on file [phone] (home)    Best Time: any     Can we leave a detailed message on this number? YES    Call taken on 4/1/2019 at 11:10 AM by Zahira Diaz

## 2019-04-10 NOTE — PROGRESS NOTES
[facility]  PRIMARY CARE PROVIDER AND CLINIC:  Dheeraj Jj MD, [address] [facility] / [facility] [address]  Chief Complaint   Patient presents with     Hospital F/U     [facility] Medical Record Number:  [id number]  Place of Service where encounter took place:  [facility] [[id number]]    Kathryn Banks  is a 79 year old  (1940), admitted to the above facility from  [facility]. Hospital stay 3/12/19 through 4/8/19..  Admitted to this facility for  rehab, medical management and nursing care.    HPI:    HPI information obtained from: facility chart records, facility staff, patient report, [facility] Epic chart review, Care Everywhere Epic chart review and nursing (after family care conference).   Brief Summary of Hospital Course:   Patient is a 80 yo woman with significantly medically-complex history including ESRD on HD, a fib on Coumadin, CAD, ANABEL, HTN, DM2, gastric bypass (2008), RLS, GERD/PUD, CHF who has been to this TCU previously for venous stasis ulcers.  She had been at home when family found her down with HR 10.  EMS brought her to the [facility] ED initially and her INR was subtherapeutic (1.0) and she was transferred to [facility] where she was found to have a clot in her AV fistula (s/p thrombectomy and angioplasty - 2/26/19) and large right MCA CVA, thought cardioembolic from subtherapeutic a fib treatment.  She had a pacemaker placed for tachy-matthew syndrome (2/26/19) and unfortunately had significant weight loss with treatment of G tube placement with TFs initiated.  From her CVA she has also suffered left hemiplegia and cognitive deficits. She was then transferred to [facility] acute rehab (3/12-4/8/19) and now has transitioned to [facility] for continued strengthening, nursing cares and medical management.      Updates on Status Since Skilled nursing Admission: Patient is met today in her room where she is very lethargic.  Nursing reported  "patient did not sleep at all last night d/t RLS and therefore was just given PRN pramipexole (thus now asleep).  It is noted she was viewed by this provider earlier at the dining room table more awake.  Her  is present for our visit today and providing most of the history as patient is not responding as much to questioning.  When asked if she is having pain, patient says no; also no SOB or CP.      Her  notes that patient has nausea with eating (is unable to say whether this happens with her TF as well), some diarrhea that is semi-chronic but more acute recently and they are concerned about \"dumping syndrome\" with patient's history of gastric bypass.  Patient's  reports patient has gross movement of LLE but LUE is hemiplegic.     Since admission patient had been started on O2 for hypoxia, CHEST XRAY was ordered and noted infiltrates so doxycycline 100 mg BID x 7 days was started by on-call MD.      CODE STATUS/ADVANCE DIRECTIVES DISCUSSION:   CPR/Full code   Patient's living condition: lives with spouse  ALLERGIES: Ciprofloxacin; Oxycodone; Lisinopril; Milk digestant [lactase]; Sulfa drugs; Tape [adhesive tape]; and Penicillins  PAST MEDICAL HISTORY:  has a past medical history of Carpal tunnel syndrome, Coronary atherosclerosis of native coronary artery (2006), OBSTRUCTIVE SLEEP APNEA, Osteoarthrosis, unspecified whether generalized or localized, unspecified site, Other and combined forms of senile cataract (2000), Other and unspecified hyperlipidemia, Paroxysmal atrial fibrillation (H), Renal failure, RESTLESS LEGS SYNDROME, TENOSYNOV HAND/WRIST NEC (6/30/2006), Type II or unspecified type diabetes mellitus without mention of complication, not stated as uncontrolled (2001), Typical atrial flutter (H) (01/17/2007), and Unspecified essential hypertension.  PAST SURGICAL HISTORY:   has a past surgical history that includes TOTAL ABDOM HYSTERECTOMY (1995); REMOVAL OF OVARY/TUBE(S); APPENDECTOMY; " REVISE MEDIAN N/CARPAL TUNNEL SURG; SOTO W/O FACETEC FORAMOT/DSKC 1/2 VRT SEG, LUMBAR (1968); VAGOTOMY/PYLOROPLASTY,SELECT (1970); REMV CATARACT INTRACAP,INSERT LENS; COLONOSCOPY THRU STOMA, DIAGNOSTIC (10/7/04); endoscopy (03/21/2000); Colonoscopy w/wo [place] **Performed** (10/31/07); UGI ENDOSCOPY DIAG W BIOPSY (10/31/07); colonoscopy (12/3/2007); UPPER GI ENDOSCOPY,EXAM (12/3/2007); cystoscopy (11/25/09); angioplasty; CABG, VEIN, FIVE (12/06); Colonoscopy (1/17/2014); Open reduction internal fixation hip nailing (Left, 7/3/2016); Laparotomy, lysis adhesions, combined (N/A, 10/24/2017); and Create Graft Arteriovenous Upper Extremi (Left, 1/9/2018).  FAMILY HISTORY: family history includes Blood Disease in her father; Diabetes in her father, maternal grandmother, and paternal grandmother; Neurologic Disorder in her father.  SOCIAL HISTORY:   reports that she quit smoking about 50 years ago. She quit after 10.00 years of use. She has never used smokeless tobacco. She reports that she drinks alcohol. She reports that she does not use drugs.    Post Discharge Medication Reconciliation Status: discharge medications reconciled, continue medications without change    Current Outpatient Medications   Medication Sig Dispense Refill     ACETAMINOPHEN PO Give 650 mg via G-Tube four times a day for pain Max acetaminophen dose: 3000mg in 2hrs AND Give 650 mg via G-Tube every 4 hours as needed for Pain *Max 3gm/24hrs.       amiodarone (PACERONE) 400 MG tablet Give 400 mg via G-Tube one time a day related to CHRONIC ATRIAL FIBRILLATION (I48.2) until 04/14/2019 23:59 x 1 week AND Give 200 mg via G-Tube one time a day related to CHRONIC ATRIAL FIBRILLATION (I48.2)       B Complex-C-Folic Acid (RENAL) 1 MG CAPS Take 1 capsule by mouth daily       cholecalciferol 2000 units tablet Take 1 tablet by mouth daily       doxycycline hyclate (VIBRAMYCIN) 100 MG capsule Take 100 mg by mouth 2 times daily X 7 days       famotidine (PEPCID) 20  MG tablet Take 20 mg by mouth daily as needed       LANsoprazole (PREVACID) 30 MG DR capsule Take 30 mg by mouth daily       lidocaine (LIDODERM) 5 % patch Apply to right hip topically one time a day for pain Apply 1 patch up to 12 hours within a 24-hour period and remove per schedule       lidocaine-prilocaine (EMLA) 2.5-2.5 % external cream Apply to Up (L)arm on fistula area topically one time a day every Tue, Thu, Sat related to PAIN, UNSPECIFIED (R52) (30 grams)Before Dialysis       melatonin 5 MG tablet Take 5 mg by mouth At Bedtime       menthol-zinc oxide (CALMOSEPTINE) 0.44-20.6 % OINT ointment Apply topically 2 times daily       nitroGLYcerin (NITROSTAT) 0.4 MG sublingual tablet Place 1 tablet (0.4 mg) under the tongue every 5 minutes as needed for chest pain 25 tablet 0     polyethylene glycol (MIRALAX/GLYCOLAX) packet Take 1 packet by mouth daily as needed for constipation       pramipexole (MIRAPEX) 0.125 MG tablet Give 0.375 mg by mouth as needed for restless leg syndrome       pravastatin (PRAVACHOL) 40 MG tablet Take 1 tablet (40 mg) by mouth daily 90 tablet 3     rOPINIRole (REQUIP) 0.25 MG tablet Take 0.25 mg by mouth At Bedtime       senna-docusate (SENOKOT-S;PERICOLACE) 8.6-50 MG per tablet Take 1-2 tablets by mouth 2 times daily as needed for constipation        sertraline (ZOLOFT) 50 MG tablet Take 50 mg by mouth daily       traZODone (DESYREL) 50 MG tablet Give 50 mg via G-Tube at bedtime for insomnia AND Give 50 mg via G-Tube as needed for insomnia may repeat once if scheduled HS dose ineffective       calcium acetate (PHOSLO) 667 MG CAPS capsule Take 667 mg by mouth 3 times daily (with meals) And 2 times daily with snacks       diltiazem ER COATED BEADS (CARDIZEM CD/CARTIA XT) 240 MG 24 hr capsule Take 1 capsule (240 mg) by mouth daily (Patient not taking: Reported on 4/10/2019) 90 capsule 3     loperamide (IMODIUM) 2 MG capsule Take 1 capsule (2 mg) by mouth daily Every Monday, Wednesday and  Friday prior to dialysis (Patient not taking: Reported on 4/10/2019) 40 capsule 1     metoprolol tartrate (LOPRESSOR) 25 MG tablet Take 2 tablets (50 mg) by mouth 2 times daily (Patient not taking: Reported on 4/10/2019) 180 tablet 1     NEPHROCAPS (TRIPHROCAPS) 1 MG capsule Take 1 capsule by mouth daily (Patient not taking: Reported on 4/10/2019) 90 capsule 3     pentoxifylline ER (TRENTAL) 400 MG CR tablet TAKE 1 TABLET BY MOUTH ONCE DAILY (Patient not taking: Reported on 4/10/2019) 90 tablet 2     pramipexole (MIRAPEX) 0.125 MG tablet Take 2 tablets (0.25 mg) by mouth At Bedtime (Patient not taking: Reported on 4/10/2019) 90 tablet 3     warfarin (COUMADIN) 2.5 MG tablet Take 5 mg Mon, Fri and 2.5 mg all other days or as directed by the coumadin clinic. (Patient not taking: Reported on 4/10/2019) 40 tablet 1       ROS:  Limited secondary to cognitive impairment but today pt reports no pain, SOB, CP    Vitals:  BP (!) 78/52   Pulse 76   Temp 98.4  F (36.9  C)   Resp 18   Wt 78.2 kg (172 lb 6.4 oz)   SpO2 92%   BMI 29.59 kg/m    Exam:  GENERAL APPEARANCE:  Lethargic, arouses to voice, in no distress, deconditioned, frail   RESP:  respiratory effort and palpation of chest normal, auscultation of lungs clear but diminished, no respiratory distress, 2 LPM O2 in place via NC  CV:  Palpation and auscultation of heart done , rate and rhythm irregular, no murmur, no LE peripheral edema, PVD changes present  ABDOMEN:  normal bowel sounds, soft, nontender, no hepatosplenomegaly or other masses  M/S:   Gait and station with W/C for mobility, hemiplegia or LUE with gross mvt LLE (per report), Digits and nails with arthritic changes, significantly reduced muscle mass  SKIN:  Inspection and Palpation of skin and subcutaneous tissue with Meiplex foam dressing on LLE shin, PVD changes to LE, G tube with mucous/TF around opening but no s/s of infection present  PSYCH:  insight and judgement, memory with impairment, affect  "and mood lethargic/hypoactive today, intermittently follows commands during this visit    Lab/Diagnostic data:  Labs done in SNF are in [place] EPIC. Please refer to them using [employer]/Care Everywhere.    ASSESSMENT/PLAN:  Left hemiplegia (H)  History of cardioembolic CVA (right MCA) - 3/2019)  Thought cardioembolic in nature (a fib) with subtherapeutic INR (1.0)  Head CT showing large right MCA CVA with repeat CTs showing right MCA territory mass effect and development of minimal petechial hemorrhagic transformation but no evidence of large parenchymal hematoma.    Deficits include left hemiplegia of LUE, hemiparesis of LLE, cognitive deficits, pusher syndrome, dysphagia, dysarthria - see below re: management.   3/12-4/8/19: [facility]      [facility] with f/u recommended 6-8 weeks post hospital discharge   Coumadin restarted  3/8/19; patient remains on statin for ppx    PLAN:  - Continue Coumadin and statin for ppx  - [facility]u with [facility] 6-8 weeks post hospital discharge (4/23-5/7/19 time frame)  - residual deficits management - see below.     Protein-calorie malnutrition, unspecified severity (H)  G tube feedings (H)  G tube placed 3/7/19 for weight loss, dysphagia post CVA  D/t History of gastric bypass, patient underwent \"open gastrostomy feeding tube placement\"  Currently on NOC bolus feeds, Nepro @75 ml x 13 hours ([id number]) with free water flushes (total fluid per Nephrology - reduced recently)  Patient also on pureed diet with thin liquids - no straws    Patient (patient's ) reports patient having nausea after eating (unknown if also with TF)  Body mass index is 29.59 kg/m .    PLAN:  - SLP following for dysphagia   - dietary following for TF management  - continue to monitor weights and tolerability of feedings    ESRD (end stage renal disease) on dialysis (H)  F/b Nephrology, Dr Barfield, [facility]   Upon admission: AV fistula with thrombus s/p " thrombectomy  Previous HD scheduled M/W/F - now T/Thurs/Sat at [facility]  LUE AV fistula with + thrill/bruit  On Nephrocaps  PTA Phos-LO DC'd while in-patient    PLAN:  - weights per HD as able  - continue Nephrocaps  - no VPT, BP on LUE  - HD T/Thurs/Sat as planned     Type 2 diabetes mellitus with chronic kidney disease on chronic dialysis, without long-term current use of insulin (H)  Lab Results   Component Value Date    A1C 5.1 04/27/2018    A1C 6.6 10/13/2017    A1C 7.3 06/22/2017    A1C 6.2 03/31/2017    A1C 6.6 07/03/2016     BG monitoring with nursing reporting blood glucose 355 this AM.    On Coumadin, statin  No ACEI d/t HoTN, allergy  PTA ASA stopped in hosp    PLAN:  - Goal: HgbA1C between 8-9%. Per AGS there is potential harm in lowering the A1C to <6.5% in older adults with diabetes. OK to monitor at this time.     Atrial fibrillation with RVR (H)  Subtherapeutic on presentation (1.0) with right MCA CVA and AV fistula thrombus s/p thrombectomy  PTA diltiazem and metoprolol DC'd  (new) amiodarone 400 mg daily x 1 week (last dose 4/14/19), then 200 mg daily  HRs variable 64-90, irregular today    Coumadin resumed 3/8/19.  PTA Coumadin dosing 5 mg M/F, 2.5 mg all other days.   04/10/19: INR 1.9  4/9  - 2.5 mg  4/8 (INR 2.3) - 2.5 mg  4/7 (INR 2.4) - 2.5 mg  4/6 (INR 2.5) - 2.5 mg  No record of Coumadin before 4/6    PLAN:  - Coumadin 2.5 mg tonight  - INR recheck tomorrow  - goal INR 2-3  - Continue (new) amiodarone as ordered  - PTA metoprolol and diltiazem DC'd     Tachy-matthew syndrome (H)  Cardiac pacemaker in situ  Found down at home with HR 10, was externally paced until in-patient  Pacemaker placed 2/26/19.   On (new) Amiodarone 400 mg daily until 4/14/19, then 200 mg daily  HRs variable with A fib: 64-90, irregular today    Pacemaker site on right chest healed and RSAHIDA, no s/s of infection present.     PLAN:  - f/u with [facility], Dr Jacob on 5/1/19  - f/u 6/1/19 for  "device check  - monitor HRs    Chronic diastolic congestive heart failure (H)  Hemodialysis-associated hypotension  Atherosclerosis of native coronary artery of native heart without angina pectoris  Essential hypertension with goal blood pressure less than 140/90  History 5v CABG 12/2006  F/b: [facility], Cardiology  PTA ASA DC'd as on Coumadin (per Cardiology)  Remains on PTA pravastatin 40 mg at bedtime    4/2/19 ECHO: EF 60-65%, global RV function is mildly reduced, severely enlarged left atrium, moderately enlarged right atrium, elevated right atrial pressures    PTA diltiazem, metoprolol DC'd  (new) amiodarone started for rate control with a fib  nitroglycerin PRN remains     BPs 70-90s/40-60s  HRs 64-90  Patient unable to comment on symptoms of CP, SOB, HA, lightheadedness effectively today but denies CP, SOB     Weight stable around 172 currently.    LS clear but diminished (being treated for PNA)  No LE edema    PLAN:  - daily weights  - monitor HoTN, carlos with HD  - f/u with Cardiology 5/1/19 as planned     Restless leg syndrome  On (new) ropinirole 0.25 mg q HS  Also has PRN pramipexole 0.125 - 3 tabs daily PRN - just given (and likely reason for patient's decreased responsiveness at today's visit).   Nursing noted patient did not sleep at all last night and had very restless legs during daytime today.    PLAN:  - continue (new) ropinirole HS  - will write for extra dose of pramipexole PRN (just given) so daily PRN dose still available for PM  - will order reduction in pramipexole to 2 tabs and order only HS PRN to assist with day/night sleep cycle maintenance     Gastroesophageal reflux disease, esophagitis presence not specified  History of peptic ulcer disease  Nausea  Hx of gastric bypass - 7/8/08  On (new) lansoprazole 30 mg daily and famotidine 20 mg daily PRN for heartburn.   Physical Therapy/Family concerned about \"dumping syndrome\" with nausea and diarrhea.   C diff Cx neg    Patient " unable to comment today on symptoms of heartburn, nausea.     PLAN:  - continue PPI with close monitoring for C diff  - continue TF and monitor for correlation of nausea with only po intake or with TF administration as well.    - can add PRN Zofran     Physical deconditioning  Weakness  Frailty  Wheelchair bound  2/2 acute on chronic illness, now s/p CVA with Pusher Syndrome noted in EHR as well.    Extreme deconditioning noted  Patient was in [facility] prior to this TCU stay    PLAN:  - Physical Therapy, Occupational Therapy, SLP for strengthening, rehab, etc.     Cognitive deficit as late effect of cerebrovascular accident (CVA)  Noted post CVA - unknown to what degree; patient very ;ethargic at today's visit d/t medication    PLAN:  - monitor lethargy/cognitive status  - Occupational Therapy for cognitive testing    Pneumonia of left lower lobe due to infectious organism (H)  CHEST XRAY 4/9/19 ordered after patient had hypoxia, congestion, - showed Left lower lobe infiltrate, with pleural effusion. Patchy right perihilar infiltrates.  On-call ordered Doxycycline 100 mg BID x 7 days  O2 orders given as well PRN  Influenza Swab negative  Sats 92-95% on O2  Patient did have HD yesterday as well  LS clear today but diminished    PLAN:  - continue (new) doxycycline x 7 days  - PRN O2, wean as able  - monitor for fevers, etc.     Depression, unspecified depression type   Anxiety  Insomnia due to medical condition   on (new) trazodone 50 mg q HS with repeat x 1 PRN, sertraline 50 mg q day, melatonin 5 mg q HS    Patient lethargic today (likely from pramipexole)    PLAN:  - continue (new) trazodone and sertraline  - discontinue Melatonin as likely unneeded if Trazodone also ordered  - monitor for need to consult in-house psych  - monitor for PHQ9 results    ACP  Discussion with patient's  today during visit about code status.   Patient currently Full Code  Information provided regarding effects of  CPR and intubation.   Questions answered to best of my ability  Patient's  noted he would talk with family at care conference happening later today re: code status.     Nursing updated that patient's daughter/POA wishes for her mom to remain full code.     transcribed by : You Kim  1. Clarification of Doxy: BID x 7 days. Dx: PNA  2. Pramipexole 0.125 mg tab- 3 tabs x 1 now. Dx: RLS  3. Monday, 4/15/19. CMP, CBC. Dx: tube feeding, diarrhea, history CVA    Orders by on-call:  INR 1.9 today   Coumadin 2.5 mg today  INR recheck tomorrow    - will order 4/11/19:  4. Zofran 4 mg q6 hours PRN  5. Change pramipexole to 2 tabs q HS PRN  6. Add Mag level to 4/15/19 labs: Dx: RLS  7. discharge Melatonin.       Total time spent with patient visit at the skilled nursing facility was >55 minutes including patient visit, review of past records and coordination of care with nursing. Greater than 50% of total time spent with counseling and coordinating care due to extensive review of EHR d/t complex pt, patient visit with discussion with family, coordination of care with nursing and IDT.   Electronically signed by:  RHODA Bowden CNP

## 2019-04-10 NOTE — LETTER
4/10/2019        RE: Kathryn Banks  [address] [address] [address]        [facility]  PRIMARY CARE PROVIDER AND CLINIC:  Dheeraj Jj MD, [address] [facility] [address]  Chief Complaint   Patient presents with     Hospital F/U     [facility] Medical Record Number:  [id number]  Place of Service where encounter took place:  [facility] ([facility]) [[id number]]    Kathryn Banks  is a 79 year old  (1940), admitted [facility] the above facility [facility]. Hospital stay 3/12/19 through 4/8/19..  Admitted to this facility for  rehab, medical management and nursing care.    HPI:    HPI information obtained from: facility chart records, facility staff, patient report, [facility] chart review, Care Everywhere [facility] chart review and nursing (after family care conference).   Brief Summary of Hospital Course:   Patient is a 80 yo woman with significantly medically-complex history including ESRD on HD, a fib on Coumadin, CAD, ANABEL, HTN, DM2, gastric bypass (2008), RLS, GERD/PUD, CHF who has been to this TCU previously for venous stasis ulcers.  She had been at home when family found her down with HR 10.  EMS brought her to the [facility] ED initially and her INR was subtherapeutic (1.0) and she was transferred to [facility] where she was found to have a clot in her AV fistula (s/p thrombectomy and angioplasty - 2/26/19) and large right MCA CVA, thought cardioembolic from subtherapeutic a fib treatment.  She had a pacemaker placed for tachy-matthew syndrome (2/26/19) and unfortunately had significant weight loss with treatment of G tube placement with TFs initiated.  From her CVA she has also suffered left hemiplegia and cognitive deficits. She was then transferred to [facility] acute rehab (3/12-4/8/19) and now has transitioned to [facility] for continued strengthening, nursing cares and medical management.      Updates on Status Since Skilled nursing  "Admission: Patient is met today in her room where she is very lethargic.  Nursing reported patient did not sleep at all last night d/t RLS and therefore was just given PRN pramipexole (thus now asleep).  It is noted she was viewed by this provider earlier at the dining room table more awake.  Her  is present for our visit today and providing most of the history as patient is not responding as much to questioning.  When asked if she is having pain, patient says no; also no SOB or CP.      Her  notes that patient has nausea with eating (is unable to say whether this happens with her TF as well), some diarrhea that is semi-chronic but more acute recently and they are concerned about \"dumping syndrome\" with patient's history of gastric bypass.  Patient's  reports patient has gross movement of LLE but LUE is hemiplegic.     Since admission patient had been started on O2 for hypoxia, CHEST XRAY was ordered and noted infiltrates so doxycycline 100 mg BID x 7 days was started by on-call MD.      CODE STATUS/ADVANCE DIRECTIVES DISCUSSION:   CPR/Full code   Patient's living condition: lives with spouse  ALLERGIES: Ciprofloxacin; Oxycodone; Lisinopril; Milk digestant [lactase]; Sulfa drugs; Tape [adhesive tape]; and Penicillins  PAST MEDICAL HISTORY:  has a past medical history of Carpal tunnel syndrome, Coronary atherosclerosis of native coronary artery (2006), OBSTRUCTIVE SLEEP APNEA, Osteoarthrosis, unspecified whether generalized or localized, unspecified site, Other and combined forms of senile cataract (2000), Other and unspecified hyperlipidemia, Paroxysmal atrial fibrillation (H), Renal failure, RESTLESS LEGS SYNDROME, TENOSYNOV HAND/WRIST NEC (6/30/2006), Type II or unspecified type diabetes mellitus without mention of complication, not stated as uncontrolled (2001), Typical atrial flutter (H) (01/17/2007), and Unspecified essential hypertension.  PAST SURGICAL HISTORY:   has a past surgical " history that includes TOTAL ABDOM HYSTERECTOMY (1995); REMOVAL OF OVARY/TUBE(S); APPENDECTOMY; REVISE MEDIAN N/CARPAL TUNNEL SURG; SOTO W/O FACETEC FORAMOT/DSKC 1/2 VRT SEG, LUMBAR (1968); VAGOTOMY/PYLOROPLASTY,SELECT (1970); REMV CATARACT INTRACAP,INSERT LENS; COLONOSCOPY THRU STOMA, DIAGNOSTIC (10/7/04); endoscopy (03/21/2000); Colonoscopy w/wo [place] **Performed** (10/31/07); UGI ENDOSCOPY DIAG W BIOPSY (10/31/07); colonoscopy (12/3/2007); UPPER GI ENDOSCOPY,EXAM (12/3/2007); cystoscopy (11/25/09); angioplasty; CABG, VEIN, FIVE (12/06); Colonoscopy (1/17/2014); Open reduction internal fixation hip nailing (Left, 7/3/2016); Laparotomy, lysis adhesions, combined (N/A, 10/24/2017); and Create Graft Arteriovenous Upper Extremi (Left, 1/9/2018).  FAMILY HISTORY: family history includes Blood Disease in her father; Diabetes in her father, maternal grandmother, and paternal grandmother; Neurologic Disorder in her father.  SOCIAL HISTORY:   reports that she quit smoking about 50 years ago. She quit after 10.00 years of use. She has never used smokeless tobacco. She reports that she drinks alcohol. She reports that she does not use drugs.    Post Discharge Medication Reconciliation Status: discharge medications reconciled, continue medications without change    Current Outpatient Medications   Medication Sig Dispense Refill     ACETAMINOPHEN PO Give 650 mg via G-Tube four times a day for pain Max acetaminophen dose: 3000mg in 2hrs AND Give 650 mg via G-Tube every 4 hours as needed for Pain *Max 3gm/24hrs.       amiodarone (PACERONE) 400 MG tablet Give 400 mg via G-Tube one time a day related to CHRONIC ATRIAL FIBRILLATION (I48.2) until 04/14/2019 23:59 x 1 week AND Give 200 mg via G-Tube one time a day related to CHRONIC ATRIAL FIBRILLATION (I48.2)       B Complex-C-Folic Acid (RENAL) 1 MG CAPS Take 1 capsule by mouth daily       cholecalciferol 2000 units tablet Take 1 tablet by mouth daily       doxycycline hyclate  (VIBRAMYCIN) 100 MG capsule Take 100 mg by mouth 2 times daily X 7 days       famotidine (PEPCID) 20 MG tablet Take 20 mg by mouth daily as needed       LANsoprazole (PREVACID) 30 MG DR capsule Take 30 mg by mouth daily       lidocaine (LIDODERM) 5 % patch Apply to right hip topically one time a day for pain Apply 1 patch up to 12 hours within a 24-hour period and remove per schedule       lidocaine-prilocaine (EMLA) 2.5-2.5 % external cream Apply to Up (L)arm on fistula area topically one time a day every Tue, Thu, Sat related to PAIN, UNSPECIFIED (R52) (30 grams)Before Dialysis       melatonin 5 MG tablet Take 5 mg by mouth At Bedtime       menthol-zinc oxide (CALMOSEPTINE) 0.44-20.6 % OINT ointment Apply topically 2 times daily       nitroGLYcerin (NITROSTAT) 0.4 MG sublingual tablet Place 1 tablet (0.4 mg) under the tongue every 5 minutes as needed for chest pain 25 tablet 0     polyethylene glycol (MIRALAX/GLYCOLAX) packet Take 1 packet by mouth daily as needed for constipation       pramipexole (MIRAPEX) 0.125 MG tablet Give 0.375 mg by mouth as needed for restless leg syndrome       pravastatin (PRAVACHOL) 40 MG tablet Take 1 tablet (40 mg) by mouth daily 90 tablet 3     rOPINIRole (REQUIP) 0.25 MG tablet Take 0.25 mg by mouth At Bedtime       senna-docusate (SENOKOT-S;PERICOLACE) 8.6-50 MG per tablet Take 1-2 tablets by mouth 2 times daily as needed for constipation        sertraline (ZOLOFT) 50 MG tablet Take 50 mg by mouth daily       traZODone (DESYREL) 50 MG tablet Give 50 mg via G-Tube at bedtime for insomnia AND Give 50 mg via G-Tube as needed for insomnia may repeat once if scheduled HS dose ineffective       calcium acetate (PHOSLO) 667 MG CAPS capsule Take 667 mg by mouth 3 times daily (with meals) And 2 times daily with snacks       diltiazem ER COATED BEADS (CARDIZEM CD/CARTIA XT) 240 MG 24 hr capsule Take 1 capsule (240 mg) by mouth daily (Patient not taking: Reported on 4/10/2019) 90 capsule 3      loperamide (IMODIUM) 2 MG capsule Take 1 capsule (2 mg) by mouth daily Every Monday, Wednesday and Friday prior to dialysis (Patient not taking: Reported on 4/10/2019) 40 capsule 1     metoprolol tartrate (LOPRESSOR) 25 MG tablet Take 2 tablets (50 mg) by mouth 2 times daily (Patient not taking: Reported on 4/10/2019) 180 tablet 1     NEPHROCAPS (TRIPHROCAPS) 1 MG capsule Take 1 capsule by mouth daily (Patient not taking: Reported on 4/10/2019) 90 capsule 3     pentoxifylline ER (TRENTAL) 400 MG CR tablet TAKE 1 TABLET BY MOUTH ONCE DAILY (Patient not taking: Reported on 4/10/2019) 90 tablet 2     pramipexole (MIRAPEX) 0.125 MG tablet Take 2 tablets (0.25 mg) by mouth At Bedtime (Patient not taking: Reported on 4/10/2019) 90 tablet 3     warfarin (COUMADIN) 2.5 MG tablet Take 5 mg Mon, Fri and 2.5 mg all other days or as directed by the coumadin clinic. (Patient not taking: Reported on 4/10/2019) 40 tablet 1       ROS:  Limited secondary to cognitive impairment but today pt reports no pain, SOB, CP    Vitals:  BP (!) 78/52   Pulse 76   Temp 98.4  F (36.9  C)   Resp 18   Wt 78.2 kg (172 lb 6.4 oz)   SpO2 92%   BMI 29.59 kg/m     Exam:  GENERAL APPEARANCE:  Lethargic, arouses to voice, in no distress, deconditioned, frail   RESP:  respiratory effort and palpation of chest normal, auscultation of lungs clear but diminished, no respiratory distress, 2 LPM O2 in place via NC  CV:  Palpation and auscultation of heart done , rate and rhythm irregular, no murmur, no LE peripheral edema, PVD changes present  ABDOMEN:  normal bowel sounds, soft, nontender, no hepatosplenomegaly or other masses  M/S:   Gait and station with W/C for mobility, hemiplegia or LUE with gross mvt LLE (per report), Digits and nails with arthritic changes, significantly reduced muscle mass  SKIN:  Inspection and Palpation of skin and subcutaneous tissue with Meiplex foam dressing on LLE shin, PVD changes to LE, G tube with mucous/TF around  "opening but no s/s of infection present  PSYCH:  insight and judgement, memory with impairment, affect and mood lethargic/hypoactive today, intermittently follows commands during this visit    Lab/Diagnostic data:  Labs done in SNF are in [facility]IC. Please refer to them using [facility]/Care Everywhere.    ASSESSMENT/PLAN:  Left hemiplegia (H)  History of cardioembolic CVA (right MCA) - 3/2019)  Thought cardioembolic in nature (a fib) with subtherapeutic INR (1.0)  Head CT showing large right MCA CVA with repeat CTs showing right MCA territory mass effect and development of minimal petechial hemorrhagic transformation but no evidence of large parenchymal hematoma.    Deficits include left hemiplegia of LUE, hemiparesis of LLE, cognitive deficits, pusher syndrome, dysphagia, dysarthria - see below re: management.   3/12-4/8/19: [facility]      F/b [facility] with f/u recommended 6-8 weeks post hospital discharge   Coumadin restarted  3/8/19; patient remains on statin for ppx    PLAN:  - Continue Coumadin and statin for ppx  - f/u with [facility] 6-8 weeks post hospital discharge (4/23-5/7/19 time frame)  - residual deficits management - see below.     Protein-calorie malnutrition, unspecified severity (H)  G tube feedings (H)  G tube placed 3/7/19 for weight loss, dysphagia post CVA  D/t History of gastric bypass, patient underwent \"open gastrostomy feeding tube placement\"  Currently on NOC bolus feeds, Nepro @75 ml x 13 hours ([id number]) with free water flushes (total fluid per Nephrology - reduced recently)  Patient also on pureed diet with thin liquids - no straws    Patient (patient's ) reports patient having nausea after eating (unknown if also with TF)  Body mass index is 29.59 kg/m .    PLAN:  - SLP following for dysphagia   - dietary following for TF management  - continue to monitor weights and tolerability of feedings    ESRD (end stage renal disease) on " dialysis (H)  F/b Nephrology, Dr Barfield, [facility]   Upon admission: AV fistula with thrombus s/p thrombectomy  Previous HD scheduled M/W/F - now T/Thurs/Sat at [facility]  LU AV fistula with + thrill/bruit  On Nephrocaps  PTA Phos-LO DC'd while in-patient    PLAN:  - weights per HD as able  - continue Nephrocaps  - no VPT, BP on LUE  - HD T/Thurs/Sat as planned     Type 2 diabetes mellitus with chronic kidney disease on chronic dialysis, without long-term current use of insulin (H)  Lab Results   Component Value Date    A1C 5.1 04/27/2018    A1C 6.6 10/13/2017    A1C 7.3 06/22/2017    A1C 6.2 03/31/2017    A1C 6.6 07/03/2016     BG monitoring with nursing reporting blood glucose 355 this AM.    On Coumadin, statin  No ACEI d/t HoTN, allergy  PTA ASA stopped in hosp    PLAN:  - Goal: HgbA1C between 8-9%. Per AGS there is potential harm in lowering the A1C to <6.5% in older adults with diabetes. OK to monitor at this time.     Atrial fibrillation with RVR (H)  Subtherapeutic on presentation (1.0) with right MCA CVA and AV fistula thrombus s/p thrombectomy  PTA diltiazem and metoprolol DC'd  (new) amiodarone 400 mg daily x 1 week (last dose 4/14/19), then 200 mg daily  HRs variable 64-90, irregular today    Coumadin resumed 3/8/19.  PTA Coumadin dosing 5 mg M/F, 2.5 mg all other days.   04/10/19: INR 1.9  4/9  - 2.5 mg  4/8 (INR 2.3) - 2.5 mg  4/7 (INR 2.4) - 2.5 mg  4/6 (INR 2.5) - 2.5 mg  No record of Coumadin before 4/6    PLAN:  - Coumadin 2.5 mg tonight  - INR recheck tomorrow  - goal INR 2-3  - Continue (new) amiodarone as ordered  - PTA metoprolol and diltiazem DC'd     Tachy-matthew syndrome (H)  Cardiac pacemaker in situ  Found down at home with HR 10, was externally paced until in-patient  Pacemaker placed 2/26/19.   On (new) Amiodarone 400 mg daily until 4/14/19, then 200 mg daily  HRs variable with A fib: 64-90, irregular today    Pacemaker site on right chest healed and RASHIDA, no s/s of infection  present.     PLAN:  - f/u with [facility], Dr Jacob on 5/1/19  - f/u 6/1/19 for device check  - monitor HRs    Chronic diastolic congestive heart failure (H)  Hemodialysis-associated hypotension  Atherosclerosis of native coronary artery of native heart without angina pectoris  Essential hypertension with goal blood pressure less than 140/90  History 5v CABG 12/2006  F/b: [facility], Cardiology  PTA ASA DC'd as on Coumadin (per Cardiology)  Remains on PTA pravastatin 40 mg at bedtime    4/2/19 ECHO: EF 60-65%, global RV function is mildly reduced, severely enlarged left atrium, moderately enlarged right atrium, elevated right atrial pressures    PTA diltiazem, metoprolol DC'd  (new) amiodarone started for rate control with a fib  nitroglycerin PRN remains     BPs 70-90s/40-60s  HRs 64-90  Patient unable to comment on symptoms of CP, SOB, HA, lightheadedness effectively today but denies CP, SOB     Weight stable around 172 currently.    LS clear but diminished (being treated for PNA)  No LE edema    PLAN:  - daily weights  - monitor HoTN, carlos with HD  - f/u with Cardiology 5/1/19 as planned     Restless leg syndrome  On (new) ropinirole 0.25 mg q HS  Also has PRN pramipexole 0.125 - 3 tabs daily PRN - just given (and likely reason for patient's decreased responsiveness at today's visit).   Nursing noted patient did not sleep at all last night and had very restless legs during daytime today.    PLAN:  - continue (new) ropinirole HS  - will write for extra dose of pramipexole PRN (just given) so daily PRN dose still available for PM  - will order reduction in pramipexole to 2 tabs and order only HS PRN to assist with day/night sleep cycle maintenance     Gastroesophageal reflux disease, esophagitis presence not specified  History of peptic ulcer disease  Nausea  Hx of gastric bypass - 7/8/08  On (new) lansoprazole 30 mg daily and famotidine 20 mg daily PRN for heartburn.   Physical  "Therapy/Family concerned about \"dumping syndrome\" with nausea and diarrhea.   C diff Cx neg    Patient unable to comment today on symptoms of heartburn, nausea.     PLAN:  - continue PPI with close monitoring for C diff  - continue TF and monitor for correlation of nausea with only po intake or with TF administration as well.    - can add PRN Zofran     Physical deconditioning  Weakness  Frailty  Wheelchair bound  2/2 acute on chronic illness, now s/p CVA with Pusher Syndrome noted in EHR as well.    Extreme deconditioning noted  Patient was in [facility] prior to this TCU stay    PLAN:  - Physical Therapy, Occupational Therapy, SLP for strengthening, rehab, etc.     Cognitive deficit as late effect of cerebrovascular accident (CVA)  Noted post CVA - unknown to what degree; patient very ;ethargic at today's visit d/t medication    PLAN:  - monitor lethargy/cognitive status  - Occupational Therapy for cognitive testing    Pneumonia of left lower lobe due to infectious organism (H)  CHEST XRAY 4/9/19 ordered after patient had hypoxia, congestion, - showed Left lower lobe infiltrate, with pleural effusion. Patchy right perihilar infiltrates.  On-call ordered Doxycycline 100 mg BID x 7 days  O2 orders given as well PRN  Influenza Swab negative  Sats 92-95% on O2  Patient did have HD yesterday as well  LS clear today but diminished    PLAN:  - continue (new) doxycycline x 7 days  - PRN O2, wean as able  - monitor for fevers, etc.     Depression, unspecified depression type   Anxiety  Insomnia due to medical condition   on (new) trazodone 50 mg q HS with repeat x 1 PRN, sertraline 50 mg q day, melatonin 5 mg q HS    Patient lethargic today (likely from pramipexole)    PLAN:  - continue (new) trazodone and sertraline  - discontinue Melatonin as likely unneeded if Trazodone also ordered  - monitor for need to consult in-house psych  - monitor for PHQ9 results    ACP  Discussion with patient's  today " during visit about code status.   Patient currently Full Code  Information provided regarding effects of CPR and intubation.   Questions answered to best of my ability  Patient's  noted he would talk with family at care conference happening later today re: code status.     Nursing updated that patient's daughter/POA wishes for her mom to remain full code.     transcribed by : You Kim  1. Clarification of Doxy: BID x 7 days. Dx: PNA  2. Pramipexole 0.125 mg tab- 3 tabs x 1 now. Dx: RLS  3. Monday, 4/15/19. CMP, CBC. Dx: tube feeding, diarrhea, history CVA    Orders by on-call:  INR 1.9 today   Coumadin 2.5 mg today  INR recheck tomorrow    - will order 4/11/19:  4. Zofran 4 mg q6 hours PRN  5. Change pramipexole to 2 tabs q HS PRN  6. Add Mag level to 4/15/19 labs: Dx: RLS  7. discharge Melatonin.       Total time spent with patient visit at the skilled nursing facility was >55 minutes including patient visit, review of past records and coordination of care with nursing. Greater than 50% of total time spent with counseling and coordinating care due to extensive review of EHR d/t complex pt, patient visit with discussion with family, coordination of care with nursing and IDT.   Electronically signed by:  RHODA Bowden CNP                       Sincerely,        RHODA Bowden CNP

## 2019-07-24 NOTE — TELEPHONE ENCOUNTER
Spoke with [facility] oxygen needs order to discontinue oxygen use.  Fax# [phone]  Letter pending, review and adjust letter if needed.

## 2019-07-24 NOTE — LETTER
[address]   [address]  Tel. [phone] / Fax [phone]    July 24, 2019        Kathryn Banks  [address]  [facility] [address]          ORDERS    Please discontinue oxygen for this patient.    Sincerely,        Dheeraj Jj MD

## 2019-07-24 NOTE — TELEPHONE ENCOUNTER
Reason for Call: Request for an order or referral:    Order or referral being requested: , Christian, called.  He states she is not going to need the oxygen that you ordered and they now need an order to have all of it removed.  He asks that you give him a call     Date needed: as soon as possible    Has the patient been seen by the PCP for this problem?     Additional comments:     Phone number Patient can be reached at:  Home number on file [phone] (home)    Best Time:  any    Can we leave a detailed message on this number?  YES    Call taken on 7/24/2019 at 10:28 AM by Cristian Ortiz

## 2019-08-12 NOTE — TELEPHONE ENCOUNTER
Reason for Call: Request for an order or referral:    Order or referral being requested: diabetic shoes    Date needed: as soon as possible    Has the patient been seen by the PCP for this problem? YES    Additional comments: Kathryn needs an order/prescription for a new pair of diabetic shoes. Kathryn's , Christian is going to be in the clinic tomorrow at 1:00 and would like to  the prescription at that time. Can the order/prescription be placed at the  for  tomorrow.    Phone number Patient can be reached at:  Home number on file [phone] (home)    Best Time:      Can we leave a detailed message on this number?  YES    Call taken on 8/12/2019 at 3:57 PM by Katlyn Gavin

## 2019-08-19 NOTE — PROGRESS NOTES
[place] [facility]  PRIMARY CARE PROVIDER AND CLINIC:  Dheeraj Jj MD, [address] [facility] [address]  Chief Complaint   Patient presents with     Hospital F/U     TCU     [place] Medical Record Number:  [id number]  Place of Service where encounter took place:  [facility] ([facility]) [[id number]]    Kathryn Banks  is a 79 year old  (1940), admitted to the above facility from a skilled nursing facility..  Admitted to this facility for  rehab, medical management and nursing care     HPI information obtained from: patient report and Care Everywhere Epic chart review.   Brief Summary of Hospital Course: n/a last hospital say in feb 2019 for CVA, bradycardia and atrial fibrillation.   MEDICATION CHANGES: n/a  RECOMMENDED FOLLOW UP: n/a  Updates on Status Since Skilled nursing Admission: none.    CODE STATUS/ADVANCE DIRECTIVES DISCUSSION:   CPR/Full code   Patient's living condition: lives with spouse    ALLERGIES: Ciprofloxacin; Oxycodone; Hydrocodone; Lisinopril; Milk digestant [lactase]; Sulfa drugs; Tape [adhesive tape]; and Penicillins  PAST MEDICAL HISTORY:  has a past medical history of Carpal tunnel syndrome, Coronary atherosclerosis of native coronary artery (2006), OBSTRUCTIVE SLEEP APNEA, Osteoarthrosis, unspecified whether generalized or localized, unspecified site, Other and combined forms of senile cataract (2000), Other and unspecified hyperlipidemia, Paroxysmal atrial fibrillation (H), Renal failure, RESTLESS LEGS SYNDROME, TENOSYNOV HAND/WRIST NEC (6/30/2006), Type II or unspecified type diabetes mellitus without mention of complication, not stated as uncontrolled (2001), Typical atrial flutter (H) (01/17/2007), and Unspecified essential hypertension.  PAST SURGICAL HISTORY:   has a past surgical history that includes TOTAL ABDOM HYSTERECTOMY (1995); REMOVAL OF OVARY/TUBE(S); APPENDECTOMY; REVISE MEDIAN N/CARPAL TUNNEL SURG; SOTO W/O FACETEC FORAMOT/DSKC 1/2 VRT SEG,  LUMBAR (1968); VAGOTOMY/PYLOROPLASTY,SELECT (1970); REMV CATARACT INTRACAP,INSERT LENS; COLONOSCOPY THRU STOMA, DIAGNOSTIC (10/7/04); endoscopy (03/21/2000); Colonoscopy w/wo [place] **Performed** (10/31/07); UGI ENDOSCOPY DIAG W BIOPSY (10/31/07); colonoscopy (12/3/2007); UPPER GI ENDOSCOPY,EXAM (12/3/2007); cystoscopy (11/25/09); angioplasty; CABG, VEIN, FIVE (12/06); Colonoscopy (1/17/2014); Open reduction internal fixation hip nailing (Left, 7/3/2016); Laparotomy, lysis adhesions, combined (N/A, 10/24/2017); and Create Graft Arteriovenous Upper Extremi (Left, 1/9/2018).  FAMILY HISTORY: family history includes Blood Disease in her father; Diabetes in her father, maternal grandmother, and paternal grandmother; Neurologic Disorder in her father.  SOCIAL HISTORY:   reports that she quit smoking about 50 years ago. She quit after 10.00 years of use. She has never used smokeless tobacco. She reports that she drinks alcohol. She reports that she does not use drugs.    Post Discharge Medication Reconciliation Status: discharge medications reconciled, continue medications without change  Current Outpatient Medications   Medication Sig Dispense Refill     ACETAMINOPHEN  mg by Gastric Tube route 4 times daily        amiodarone (PACERONE) 400 MG tablet Give 400 mg via G-Tube one time a day related to CHRONIC ATRIAL FIBRILLATION (I48.2) until 04/14/2019 23:59 x 1 week AND Give 200 mg via G-Tube one time a day related to CHRONIC ATRIAL FIBRILLATION (I48.2)       B Complex-C-Folic Acid (RENAL) 1 MG CAPS Take 1 capsule by mouth daily       lidocaine (LIDODERM) 5 % patch Apply to right hip topically one time a day for pain Apply 1 patch up to 12 hours within a 24-hour period and remove per schedule       lidocaine-prilocaine (EMLA) 2.5-2.5 % external cream Apply topically three times a week M/W/F Before Dialysis       Menthol, Topical Analgesic, (BIOFREEZE) 4 % GEL Externally apply topically 3 times daily as needed        midodrine (PROAMATINE) 10 MG tablet Take 10 mg by mouth three times a week M/W/F for Dialysis       nitroGLYcerin (NITROSTAT) 0.4 MG sublingual tablet Place 1 tablet (0.4 mg) under the tongue every 5 minutes as needed for chest pain 25 tablet 0     pramipexole (MIRAPEX) 0.125 MG tablet Take 2 tablets (0.25 mg) by mouth At Bedtime 90 tablet 3     pravastatin (PRAVACHOL) 40 MG tablet Take 1 tablet (40 mg) by mouth daily 90 tablet 3     ranitidine (ZANTAC) 75 MG tablet 150 mg by Per G Tube route daily       rOPINIRole (REQUIP) 0.25 MG tablet Take 0.25 mg by mouth At Bedtime       sertraline (ZOLOFT) 25 MG tablet Take 25 mg by mouth daily Take w/50 = 75 mg       sertraline (ZOLOFT) 50 MG tablet Take 50 mg by mouth daily Take w/25 mg = 75 mg       order for DME Equipment being ordered: Diabetic shoes 1 Device 0       REVIEW OF SYSTEMS:   10 point ROS of systems including Constitutional, Eyes, Respiratory, Cardiovascular, Gastroenterology, Genitourinary, Integumentary, Musculoskeletal, Psychiatric were all negative except for pertinent positives noted in my HPI.    PHYSICAL EXAM:  /68   Pulse 87   Temp 96.7  F (35.9  C)   Resp 16   Wt 60.3 kg (133 lb)   SpO2 96%   BMI 22.83 kg/m    GENERAL APPEARANCE:  Alert, in no distress  RESP:  respiratory effort and palpation of chest normal, no respiratory distress, lungs sounds clear  CV:  Palpation and auscultation of heart done , regular rate and rhythm, no murmur, rub, or gallop, edema none.  ABDOMEN:  normal bowel sounds, soft, nontender,   M/S:  Gait and station observed in wheelchair, no tenderness or swelling of the joints   Neuro: CN 2 - 12 intact. Speech understandable. Left UE without purposeful use.   SKIN:  Inspection and palpation of skin and subcutaneous tissue at baseline  PSYCH:  insight and judgement, memory seems intact, affect and mood normal    ASSESSMENT/PLAN:  Left hemiparesis (H)  New following CVA in feb 2019. Will need assisted living and family  is looking into apartment for both her and her . She is continuing with Physical Therapy / Ocupational Therapy .  following for discharge planning.     Type 2 diabetes mellitus with chronic kidney disease on chronic dialysis, without long-term current use of insulin (H)  Diet controlled. Checking blood sugar PRN. No changes.    Atrial fibrillation with RVR (H)  Has pacemaker. Rate controlled. Not on beta blockers due to bradycardia. Continues on warfarin for clot prevention. INR currently difficult to stabilize and patietn on very low doses of warfarin.     ESRD (end stage renal disease) on dialysis (H)  MWF dialysis. On renal diet and 1500 ml fluid restriction. Gets midodrine with dialysis days.     Anemia  No recent hgb on file. Will see if we can get labs from dialysis.     Atherosclerosis of native coronary artery of native heart without angina pectoris  Denies chest pain continue warfarin.     Chronic diastolic congestive heart failure (H)  Stable. Continue dialysis for fluid management.     Gastroesophageal reflux disease, esophagitis presence not specified  Denies heart burn. Controlled on current regimen.       Total time spent with patient visit at the skilled nursing facility was 35 min including patient visit and review of past records. Greater than 50% of total time spent with counseling and coordinating care due to counseling patient/resident for 20 minutes on: the reason for their hospitalization and what the treatment is.    Electronically signed by:  Hortensia DUONG CNP      Orders written by provider at facility and transcribed by : Marsha Teran MA   1. INR 2.8 warfarin 0.5 mg po everyday and recheck on Monday.

## 2019-08-26 PROBLEM — I69.391 DYSPHAGIA AS LATE EFFECT OF CEREBROVASCULAR ACCIDENT (CVA): Status: ACTIVE | Noted: 2019-01-01

## 2019-08-26 PROBLEM — R00.1 BRADYCARDIC CARDIAC ARREST (H): Status: ACTIVE | Noted: 2019-01-01

## 2019-08-26 PROBLEM — I46.2 BRADYCARDIC CARDIAC ARREST (H): Status: ACTIVE | Noted: 2019-01-01

## 2019-08-26 PROBLEM — T82.868A: Status: ACTIVE | Noted: 2019-01-01

## 2019-08-26 PROBLEM — I63.311 CEREBROVASCULAR ACCIDENT (CVA) DUE TO THROMBOSIS OF RIGHT MIDDLE CEREBRAL ARTERY (H): Status: ACTIVE | Noted: 2019-01-01

## 2019-09-13 NOTE — PROGRESS NOTES
[place] [facility]  [place] Medical Record Number:  [id number]  Place of Service where encounter took place: [facility] ([facility]) [[id number]]    HPI:    Kathryn Banks is a 79 year old  (1940), who is being seen today for an episodic care visit at the above location.   HPI information obtained from: facility chart records, facility staff and patient report. Today's concern is INR/Coumadin management for A. Fib    ROS/Subjective:  Bleeding Signs/Symptoms:  None  Thromboembolic Signs/Symptoms:  None  Medication Changes:  No  Dietary Changes:  No  Activity Changes: No  Bacterial/Viral Infection:  No  Missed Coumadin Doses:  None  Other Concerns:  No    OBJECTIVE:  /59   Pulse 72   Temp 98.6  F (37  C)   Resp 20   Wt 54.3 kg (119 lb 9.6 oz)   SpO2 90%   BMI 20.53 kg/m    Last INR: 3.6 on 9/10  INR Today:  3.3  Current Dose:  Was on 1 mg twice weekly and 0.5 mg 5 days per week. Warfarin was held on 9/10 and she got 0.5 mg on 9/11 and 9/12.    ASSESSMENT:     Atrial fibrillation with RVR (H)  History of cardioembolic cerebrovascular accident (CVA)  Long term current use of anticoagulant therapy  Encounter for therapeutic drug monitoring  Supratherapeutic INR for goal of 2-3    PLAN:  Orders written by provider at facility and transcribed by : Marsha Teran MA   1.  INR 3.3 - Continue Warfarin 0.5 mg po every day.  Dx: A fib  2.  Recheck INR on Monday.  Dx: A fib    Electronically signed by:  Hortensia Alcala NP

## 2019-09-16 NOTE — PROGRESS NOTES
[place] [facility]    Chief Complaint   Patient presents with     RECHECK       Place of Service where encounter took place:  No question data found.    HPI:    Kathryn Banks is a 79 year old  (1940), who is being seen today for an episodic care visit.  HPI information obtained from: facility chart records, facility staff and patient report.Today's concern is:     ESRD (end stage renal disease) on dialysis (H)  Hypotension, unspecified hypotension type  Hypoglycemia  Elevated INR     Nurse called to update me that patient was confused and had low b/p during the night. Went to see patient and ordered midodrine 10 mg PO x 1 dose. Nursing staff was unable to get b/p reading with machine. Patient was awake and able to answer simple questions. Stated she did not want to go to hospital. approx 10 minutes later nurse reported that patient had blood sugar of 39 and INR of 7. At that time decision was made to transport to ER. I sat with patient and gave her glucose gel. She was able to get most of it. Though it was complicated by her facial droop and pocketing of food. Patient was able to tell me she was feeling better. She was transported emergently to ER via ambulance.     ALLERGIES: Ciprofloxacin; Oxycodone; Hydrocodone; Lisinopril; Milk digestant [lactase]; Sulfa drugs; Tape [adhesive tape]; and Penicillins  Past Medical, Surgical, Family and Social History reviewed and updated in EPIC.    REVIEW OF SYSTEMS:  Unobtainable secondary to condition and confusion. Denies resp symptoms. Stomach pains. Lower abd pain. Urinary frequency and dysuria.     PHYSICAL EXAM:  BP (!) 84/49   Pulse 101   Temp 95  F (35  C)   Resp 22   Wt 54.4 kg (120 lb)   SpO2 (!) 83%   BMI 20.60 kg/m    GENERAL APPEARANCE:  Lethargic in no distress.   RESP:  respiratory effort and palpation of chest normal, no respiratory distress. On oxygen via n/c  CV:  Radial pulse present.  ABDOMEN: soft, nontender,   M/S:  Gait and station  observed in bed.    Assessment/Plan:     ESRD (end stage renal disease) on dialysis (H)  Hypotension, unspecified hypotension type  Hypoglycemia  Elevated INR   Given 10 mg midodrine for hypotension. Given glucose gel for hypoglycemia.   Patient sent to ER via ambulance.     Electronically signed by  Hortensia Alcala NP    Total time spent with patient visit at the skilled nursing facility was 35 min including patient visit. Greater than 50% of total time spent with counseling and coordinating care due to patient condition.

## 2019-09-17 PROBLEM — J15.69: Status: RESOLVED | Noted: 2019-01-01 | Resolved: 2019-09-17

## 2019-09-17 PROBLEM — N17.9 ACUTE ON CHRONIC RENAL FAILURE (H): Status: RESOLVED | Noted: 2017-06-12 | Resolved: 2019-09-17

## 2019-09-17 PROBLEM — S32.402A CLOSED FRACTURE OF LEFT ACETABULUM (H): Status: RESOLVED | Noted: 2017-02-03 | Resolved: 2019-09-17

## 2019-09-17 PROBLEM — S70.01XA CONTUSION OF RIGHT HIP, INITIAL ENCOUNTER: Status: RESOLVED | Noted: 2018-03-23 | Resolved: 2019-09-17

## 2019-09-17 PROBLEM — J94.8 HYDROPNEUMOTHORAX: Status: RESOLVED | Noted: 2018-03-26 | Resolved: 2019-09-17

## 2019-09-17 PROBLEM — J18.9 PNEUMONIA: Status: RESOLVED | Noted: 2019-01-01 | Resolved: 2019-09-17

## 2019-09-17 PROBLEM — G81.94 LEFT HEMIPARESIS (H): Status: ACTIVE | Noted: 2019-09-17
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
## 2019-09-17 PROBLEM — R06.02 SHORTNESS OF BREATH: Status: RESOLVED | Noted: 2018-01-01 | Resolved: 2019-09-17

## 2019-09-17 PROBLEM — N17.0 ACUTE RENAL FAILURE WITH TUBULAR NECROSIS (H): Status: RESOLVED | Noted: 2017-02-03 | Resolved: 2019-09-17

## 2019-09-17 PROBLEM — S42.035A CLOSED NONDISPLACED FRACTURE OF ACROMIAL END OF LEFT CLAVICLE: Status: RESOLVED | Noted: 2017-02-03 | Resolved: 2019-09-17
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
## 2019-09-17 PROBLEM — I63.311 CEREBROVASCULAR ACCIDENT (CVA) DUE TO THROMBOSIS OF RIGHT MIDDLE CEREBRAL ARTERY (H): Status: RESOLVED | Noted: 2019-01-01 | Resolved: 2019-09-17

## 2019-09-17 PROBLEM — S72.002G CLOSED FRACTURE OF LEFT HIP WITH DELAYED HEALING: Status: RESOLVED | Noted: 2017-03-14 | Resolved: 2019-09-17

## 2019-09-17 PROBLEM — Z91.148 NONCOMPLIANCE WITH MEDICATIONS: Status: RESOLVED | Noted: 2018-06-08 | Resolved: 2019-09-17

## 2019-09-17 PROBLEM — I50.9 ACUTE DECOMPENSATED HEART FAILURE (H): Status: RESOLVED | Noted: 2017-02-03 | Resolved: 2019-09-17

## 2019-09-17 PROBLEM — I50.9 CHF (CONGESTIVE HEART FAILURE) (H): Status: RESOLVED | Noted: 2018-01-01 | Resolved: 2019-09-17

## 2019-09-17 PROBLEM — S32.599A CLOSED FRACTURE OF MULTIPLE PUBIC RAMI (H): Status: RESOLVED | Noted: 2018-03-23 | Resolved: 2019-09-17

## 2019-09-17 PROBLEM — N18.9 ACUTE ON CHRONIC RENAL FAILURE (H): Status: RESOLVED | Noted: 2017-06-12 | Resolved: 2019-09-17

## 2019-09-17 PROBLEM — E11.9 TYPE 2 DIABETES MELLITUS (H): Status: RESOLVED | Noted: 2017-10-29 | Resolved: 2019-09-17

## 2019-09-17 PROBLEM — Z79.01 ANTICOAGULATED ON COUMADIN: Status: RESOLVED | Noted: 2017-02-03 | Resolved: 2019-09-17

## 2019-09-17 ASSESSMENT — ENCOUNTER SYMPTOMS
ABDOMINAL PAIN: 0
ARTHRALGIAS: 1
EYE PAIN: 0
SORE THROAT: 0
COUGH: 0
FEVER: 0
DIZZINESS: 0
FREQUENCY: 0
SHORTNESS OF BREATH: 0
NAUSEA: 0
HEADACHES: 0
CONSTIPATION: 0
APPETITE CHANGE: 0
HEARTBURN: 0

## 2019-09-17 NOTE — PROGRESS NOTES
HISTORY OF PRESENT ILLNESS:  Kathryn is a 79 year old female, (1940), admitted to [facility] from a skilled nursing facility..  Admitted to this facility for  rehab, medical management and nursing care      HPI information obtained from: patient report and Care Everywhere Epic chart review.     Brief Summary of Hospital Course: n/a last hospital say in feb 2019 for CVA, bradycardia and atrial fibrillation.   MEDICATION CHANGES: n/a  RECOMMENDED FOLLOW UP: n/a  Updates on Status Since Skilled nursing Admission: none.    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       Physical deconditioning 11/06/2017     Priority: Medium     Bilateral leg edema 11/06/2017     Priority: Medium     SBO -- S/P Lysis of [place]hes 10/24/17 10/22/2017     Priority: Medium     Paroxysmal atrial fibrillation (H) 10/20/2017     Priority: Medium     Anxiety 10/20/2017     Priority: Medium     Anemia in chronic kidney disease 09/20/2017     Priority: Medium     Microscopic hematuria 06/21/2017     Priority: Medium     Memory loss 06/12/2017     Priority: Medium     Proteinuria 2.1 g/g Cr 06/02/2017     Priority: Medium     Chronic heart failure (H) with preserved EF 05/31/2017     Priority: Medium     Atrial flutter (H) - present 6/12 05/31/2017     Priority: Medium     Pulmonary hypertension (H) 05/24/2017     Priority: Medium     Postprocedural hypotension 03/09/2017      Priority: Medium     Type 2 diabetes mellitus with complication (H) 02/03/2017     Priority: Medium     Essential hypertension with goal blood pressure less than 140/90 09/13/2016     Priority: Medium     Osteoarthritis of hip 04/29/2015     Priority: Medium     Lymphedema 11/10/2014     Priority: Medium     Health Care Home 01/20/2014     Priority: Medium     State Tier Level:  Tier 3  Status:  Closed  Care Coordinator:  Pinky Oden if needed in the future.     See Letters for [facility] Care Plan         CAD - NSTEMI, CABG x 5 in 2006, angio 2013 patent grafts except occluded graft to PL 02/04/2013     Priority: Medium     Hyperlipidemia, unspecified hyperlipidemia type 10/31/2010     Priority: Medium     Kidney stone 12/29/2009     Priority: Medium     Esophageal reflux 03/28/2006     Priority: Medium     Lipoma of other skin and subcutaneous tissue 09/07/2004     Priority: Medium     Allergic rhinitis 06/07/2003     Priority: Medium     ANABEL on CPAP      Priority: Medium     Advanced directives, counseling/discussion 11/12/2012     Priority: Low     Advance Directive received and scanned. Click on Code in the patient header to view. 11/12/2012          History of peptic ulcer disease 10/17/2007     Priority: Low     Problem list name updated by automated process. Provider to review       Restless leg syndrome      Priority: Low       Past Surgical History:   Procedure Laterality Date     ANGIOPLASTY       C APPENDECTOMY       C CABG, VEIN, FIVE  12/06    SVG x 4 and LIMA to LAD     C SOTO W/O FACETEC FORAMOT/DSKC 1/2 VRT SEG, LUMBAR  1968     C REMV CATARACT INTRACAP,INSERT LENS      Bilateral     C TOTAL ABDOM HYSTERECTOMY  1995     - fibroids     C VAGOTOMY/PYLOROPLASTY,SELECT  1970     COLONOSCOPY  12/3/2007     COLONOSCOPY  1/17/2014    Procedure: COLONOSCOPY;  Colonoscopy;  Surgeon: Zack Stearns MD;  Location: [facility]     CREATE GRAFT LOOP ARTERIOVENOUS UPPER EXTREMITY Left 1/9/2018    Procedure: CREATE GRAFT  ARTERIOVENOUS UPPER EXTREMITY;  Left Upper Arm Arteriovenous Graft Creation, Anesthesia Block ;  Surgeon: Cassie Cardenas MD;  Location: [facility]     [place]STOSCOPY  09    [place]     ENDOSCOPY  2000    [facility]     HC COLONOSCOPY THRU STOMA, DIAGNOSTIC  10/7/04    poor prep, repeat in 2-3 years     HC COLONOSCOPY W/WO BRUSH/WASH  10/31/07    Repeat-poor quality prep     HC REMOVAL OF OVARY/TUBE(S)      Salpingo-Oophorectomy, Unilateral     HC REVISE MEDIAN N/CARPAL TUNNEL SURG      Carpal tunnel release     HC UGI ENDOSCOPY DIAG W BIOPSY  10/31/07     HC UGI ENDOSCOPY, SIMPLE EXAM  12/3/2007     LAPAROTOMY, LYSIS ADHESIONS, COMBINED N/A 10/24/2017    Procedure: COMBINED LAPAROTOMY, LYSIS ADHESIONS;  REPAIR FOCAL ILEAL SMALL BOWEL PERFERATION, LYSIS OF ADHESIONS.;  Surgeon: Rashard Zafar MD;  Location: [facility]     OPEN REDUCTION INTERNAL FIXATION HIP NAILING Left 7/3/2016    Procedure: OPEN REDUCTION INTERNAL FIXATION HIP NAILING;  Surgeon: Lake Schulz MD;  Location:  OR       Family History   Problem Relation Age of Onset     Diabetes Father         AODM     Neurologic Disorder Father         Parkinson's     Blood Disease Father          from blood clot from leg to lung immediately after hip fracture     Diabetes Paternal Grandmother         adult onset     Diabetes Maternal Grandmother         adult onset     Hypertension No family hx of      Cerebrovascular Disease No family hx of            Social History     Socioeconomic History     Marital status:      Spouse name: Urbano Banks     Number of children: 2     Years of education: 13     Highest education level: Not on file   Occupational History     Occupation: Ministry coordinator     Comment: [employer]   Social Needs     Financial resource strain: Not on file     Food insecurity:     Worry: Not on file     Inability: Not on file     Transportation needs:     Medical: Not on file     Non-medical:  Not on file   Tobacco Use     Smoking status: Former Smoker     Years: 10.00     Last attempt to quit: 1969     Years since quittin.7     Smokeless tobacco: Never Used   Substance and Sexual Activity     Alcohol use: Yes     Alcohol/week: 0.0 oz     Comment: couple times a week     Drug use: No     Sexual activity: Yes     Birth control/protection: Surgical   Lifestyle     Physical activity:     Days per week: Not on file     Minutes per session: Not on file     Stress: Not on file   Relationships     Social connections:     Talks on phone: Not on file     Gets together: Not on file     Attends Zoroastrian service: Not on file     Active member of club or organization: Not on file     Attends meetings of clubs or organizations: Not on file     Relationship status: Not on file     Intimate partner violence:     Fear of current or ex partner: Not on file     Emotionally abused: Not on file     Physically abused: Not on file     Forced sexual activity: Not on file   Other Topics Concern     Parent/sibling w/ CABG, MI or angioplasty before 65F 55M? No   Social History Narrative     Not on file        Current Outpatient Medications   Medication Sig     ACETAMINOPHEN  mg by Gastric Tube route 4 times daily      amiodarone (PACERONE) 400 MG tablet Give 400 mg via G-Tube one time a day related to CHRONIC ATRIAL FIBRILLATION (I48.2) until 2019 23:59 x 1 week AND Give 200 mg via G-Tube one time a day related to CHRONIC ATRIAL FIBRILLATION (I48.2)     B Complex-C-Folic Acid (RENAL) 1 MG CAPS Take 1 capsule by mouth daily     lidocaine (LIDODERM) 5 % patch Apply to right hip topically one time a day for pain Apply 1 patch up to 12 hours within a 24-hour period and remove per schedule     lidocaine-prilocaine (EMLA) 2.5-2.5 % external cream Apply topically three times a week M/W/F Before Dialysis     Menthol, Topical Analgesic, (BIOFREEZE) 4 % GEL Externally apply topically 3 times daily as needed      midodrine (PROAMATINE) 10 MG tablet Take 10 mg by mouth three times a week M/W/F for Dialysis     nitroGLYcerin (NITROSTAT) 0.4 MG sublingual tablet Place 1 tablet (0.4 mg) under the tongue every 5 minutes as needed for chest pain     order for DME Equipment being ordered: Diabetic shoes     pramipexole (MIRAPEX) 0.125 MG tablet Take 2 tablets (0.25 mg) by mouth At Bedtime     pravastatin (PRAVACHOL) 40 MG tablet Take 1 tablet (40 mg) by mouth daily     ranitidine (ZANTAC) 75 MG tablet 150 mg by Per G Tube route daily     rOPINIRole (REQUIP) 0.25 MG tablet Take 0.25 mg by mouth At Bedtime     sertraline (ZOLOFT) 25 MG tablet Take 25 mg by mouth daily Take w/50 = 75 mg     sertraline (ZOLOFT) 50 MG tablet Take 50 mg by mouth daily Take w/25 mg = 75 mg     No current facility-administered medications for this visit.        Allergies   Allergen Reactions     Ciprofloxacin Nausea and Vomiting     Zofran did not help     Oxycodone Visual Disturbance     Delusions, blackouts      Hydrocodone      Lisinopril Cough     Milk Digestant [Lactase]      Stomach up set when drinks milk      Sulfa Drugs GI Disturbance     LOSS OF TASTE     Tape [Adhesive Tape]      Penicillins Other (See Comments)     Swelling and reddness at injection site, pt wanted it on        Information reviewed:  Medications, vital signs, orders, and nursing notes.    Review of Systems   Constitutional: Negative for appetite change and fever.   HENT: Negative for congestion, ear pain and sore throat.    Eyes: Negative for pain.   Respiratory: Negative for cough and shortness of breath.    Cardiovascular: Negative for chest pain and peripheral edema.   Gastrointestinal: Negative for abdominal pain, constipation, heartburn and nausea.   Genitourinary: Negative for frequency.   Musculoskeletal: Positive for arthralgias (Right hip pain following a fall).   Neurological: Negative for dizziness and headaches.   Psychiatric/Behavioral: Negative for mood changes.         OBJECTIVE:  /64   Pulse 95   Temp 97.6  F (36.4  C)   Resp 16   Wt 55 kg (121 lb 4.8 oz)   SpO2 98%   BMI 20.82 kg/m    Physical Exam   Constitutional: She appears well-developed and well-nourished. She is cooperative. No distress.   Sitting in wheelchair awaiting ride for dialysis   HENT:   Right Ear: External ear normal.   Left Ear: External ear normal.   Nose: Nose normal.   Mouth/Throat: Oropharynx is clear and moist.   Eyes: Pupils are equal, round, and reactive to light. Conjunctivae are normal. Right eye exhibits no discharge. Left eye exhibits no discharge.   Neck: Neck supple. No tracheal deviation present. No thyromegaly present.   Cardiovascular: Normal rate, regular rhythm, S1 normal, S2 normal and normal heart sounds.   No murmur heard.  Pulmonary/Chest: Effort normal and breath sounds normal. No respiratory distress. She has no wheezes. She has no rales.   Abdominal: Soft. Bowel sounds are normal. She exhibits no mass. There is no tenderness.   Musculoskeletal: Normal range of motion. She exhibits no edema.   Mild tenderness to palpation over the right sacroiliac area.  No tenderness over the trochanteric area.   Lymphadenopathy:     She has no cervical adenopathy.   Neurological: She is alert. She has normal strength and normal reflexes. She exhibits normal muscle tone.   Skin: Skin is warm and dry. No rash noted.   Psychiatric: She has a normal mood and affect.        ASSESSMENT/PLAN:    Left hemiparesis (H)  Following stroke in February 2019.  Currently doing physical therapy and Occupational Therapy.  Plan is for long-term care.    Type 2 diabetes mellitus with complication (H)  Blood sugars are ranging 70s-130s.  She is diet controlled.    Hyperlipidemia, unspecified hyperlipidemia type  Remains on statin.    Chronic heart failure (H) with preserved EF  Last echocardiogram April 2019 with preserved ejection fraction.  Dialysis continues for fluid management.  She continues to  follow with cardiology.    Paroxysmal atrial fibrillation (H)  Has a pacemaker.  She is currently rate controlled.  She is not on beta-blocker secondary to history of bradycardia.  She remains on warfarin for anticoagulation.    End-stage renal disease on hemodialysis (H)  Dialysis 3 times a week.  On a renal diet and 1500 mL fluid restriction.  She takes midodrine on dialysis days.          Tala Cisneros MD

## 2019-09-17 NOTE — ASSESSMENT & PLAN NOTE
Dialysis 3 times a week.  On a renal diet and 1500 mL fluid restriction.  She takes midodrine on dialysis days.

## 2019-09-17 NOTE — ASSESSMENT & PLAN NOTE
Last echocardiogram April 2019 with preserved ejection fraction.  Dialysis continues for fluid management.  She continues to follow with cardiology.

## 2019-09-17 NOTE — ASSESSMENT & PLAN NOTE
Has a pacemaker.  She is currently rate controlled.  She is not on beta-blocker secondary to history of bradycardia.  She remains on warfarin for anticoagulation.

## 2019-09-17 NOTE — ASSESSMENT & PLAN NOTE
Following stroke in February 2019.  Currently doing physical therapy and Occupational Therapy.  Plan is for long-term care.

## 2020-01-11 NOTE — PLAN OF CARE
Problem: Patient Care Overview  Goal: Plan of Care/Patient Progress Review  Outcome: Declining  Pt had dialysis (1.6kg out) and bedside thora (1250cc out) today, pt became hypotensive after, 50mL albumin given x2 and BP stabilized, 2 more doses albumin given at 1830 per cards.  Pt also in Afib RVR at end of dialysis, Dilt gtt started and currently @ 10mg/hr.  Cee patent, UOP decreased post dialysis, 200cc prior.  Venous stasis/scabs on legs, rash under breasts, cares done, bottom reddened/blanchable, mepilex placed.       Improved

## 2020-09-16 NOTE — ASSESSMENT & PLAN NOTE
Continue Pepcid   [Initial Evaluation] : an initial evaluation of [Dyspnea] : dyspnea [Chest Pain] : chest pain

## 2020-09-25 NOTE — PROGRESS NOTES
[facility] Intensive Care Unit  Comprehensive Daily ICU Note  October 4, 2017      Kathryn Banks MRN# [id number]   Age: 77 year old YOB: 1940     Date of Admission: 10/2/2017  Primary care provider: Dheeraj Jj              Diagnosis/Problem list:         Patient Active Problem List     Diagnosis     Septic shock (H)     Anemia in stage 5 chronic kidney disease (H)     CKD (chronic kidney disease) stage 5, GFR less than 15 ml/min (H)     Volume overload     Pulmonary edema     Acidosis     Anemia     Chronic heart failure (H) with preserved EF     Type 2 diabetes mellitus with other circulatory complications     CAD - NSTEMI, CABG x 5 2007, angio in 2013 with patent grafts other than occluded graft to PL      CODE STATUS: Full Code         Assessment and Plan:     Kathryn Banks is a 77 year old female admitted on 10/2/2017 and currently in the ICU for/with the problem(s) listed above. Based on my exam and review of the data,  the plan for the next 24h is as follows:    Neurology:   1. Altered Mental Status -- Multifactorial, most likely related to uremia, medications. Improving, patient no longer reports hallucinations. Has not shown aggression towards staff. At risk for delirium given age and acute medical status, continue to provide orientation and redirection as needed. RASS 0.      2. Pain -- Tylenol as first option. Cautious use of dilaudid.     Pulmonary:   1. Acute respiratory failure -- Pulmonary edema 2/2 decompensated heart failure and pneumonia. Respiratory status stable today, continues to need supplemental oxygen; sats 95% on 100% HFNC O2.    - Wean oxygen as tolerated    2. Hx of sleep apnea -- On CPAP at night at home.  - Continue BiPAP/CPAP when asleep    Cardiovascular system: 145/81,  systolic, 49-92 diastolic  1. Hypotension -- weaned off pressors 10/3.      2. Hx hypertension -- currently holding home antihypertensive medications. She is receive  hydrocortisone which may be contributing to her elevated BP. Will continue to monitor.     3. HFpEF -- Echo 10/2 unchanged from baseline 8/2017. EF 60-65%, diastolic dysfunction, and mild-moderately reduced RV function.      4. Lower extremity ulcerations -- venous stasis vs. Arterial stasis ulceration. Improving with wound cares.     Renal/Electrolytes:  1. ESRD -- worsening of baseline CKD 2/2 hypovolemia, sepsis.  Creatinine improved from 5.24 to 3.19 with dialysis.   - Nephrology consulted, appreciate involvement. Planned dialysis this evening.  - Given the possibility of needing ongoing dialysis, will leave femoral line in place for now. If she will be going on outpatient dialysis, will discuss with IR about placing a tunneled dialysis catheter.   - Begin Bumex 2mg IV Q8H    2. Low UOP -- UOP 235mL for past 24 hrs. Cee in place.   - Strict I/Os    3. Metabolic acidosis -- improved with bicarbonate, pH 7.06-->7.29.     4. Hypokalemia -- Potassium 3.2 today. Will replete with 40mEq oral KCl    ID: Afebrile, WBC 10.8--> 9  1. Pneumonia --  Sputum cultures returned small amount of yeast, will discontinue vancomycin today. Her hypoxia, evidence of infiltrates on CXR, and productive cough are consistent with clinical pneumonia.  - Continue  Piperacillin/tazobactam for 7 day course (stop: 10/9)     2. Hx C. Difficile on oral vancomycin, diagnosed 9/14/17 at [facility]. Was initially on oral metronidazole but transitioned to a 14 day course of oral vancomycin on 9/22/17. Negative stool C Diff Toxin PCR this admission.    - Continue oral vancomycin through 10/6/17    GI/Nutrition  1. Diet -- Dialysis diet, small bites only today.     Endocrine:   1. Hyperglycemia with history of diabetes -- BG ranges 118-157 today.   - Continue subcutaneous SSI pre-meals and at bedtime    2. Relative adrenal insufficiency -- serum cortisol level was normal on admission, but given level of hypotension would have expected cortisol level to be  elevated if adrenals were responding appropriately. Given IV hydrocortisone 10/2-10/3, will discontinue today as patient is hypertensive.     Heme/Onc:  1. Supratherapeutic INR on warfarin --INR improved 6-->1.82 with Vitamin K.   - Continue to hold warfarin      2. Normocytic Anemia -- most likely acute on chronic anemia 2/2 CKD.  Received 1 unit pRBC 10/3. Most recent Hgb 7.1. Will continue to monitor and transfuse as appropriate.     ICU prophylaxis:   Receiving SQH Q8H for DVT prophylaxis.     Restrain:  Restrain for medical healing needed :NO.         Key treatment goals for next 24 hours:   1. Hemodialysis  2. Monitor oxygenation levels and wean O2 as appropriate         Medications:     Current Facility-Administered Medications Ordered in Epic   Medication Dose Route Frequency Last Dose     bumetanide (BUMEX) injection 2 mg  2 mg Intravenous Q8H 2 mg at 10/04/17 0913     cholecalciferol (vitamin D3) capsule CAPS 5,000 Units  5,000 Units Oral Daily       calcitRIOL (ROCALTROL) capsule 0.25 mcg  0.25 mcg Oral Daily       potassium chloride SA (K-DUR/KLOR-CON M) CR tablet 40 mEq  40 mEq Oral Once       HYDROmorphone (PF) (DILAUDID) injection 0.2 mg  0.2 mg Intravenous Q3H PRN 0.2 mg at 10/03/17 1425     insulin aspart (NovoLOG) inj (RAPID ACTING)  1-7 Units Subcutaneous TID AC 1 Units at 10/04/17 0919     insulin aspart (NovoLOG) inj (RAPID ACTING)  1-5 Units Subcutaneous At Bedtime       acetaminophen (TYLENOL) tablet 325-650 mg  325-650 mg Oral Q6H  mg at 10/03/17 1027     heparin sodium PF injection 5,000 Units  5,000 Units Subcutaneous Q8H 5,000 Units at 10/04/17 0604     naloxone (NARCAN) injection 0.1-0.4 mg  0.1-0.4 mg Intravenous Q2 Min PRN       norepinephrine (LEVOPHED) 16 mg in D5W 250 mL infusion  0.03-0.4 mcg/kg/min Intravenous Continuous Stopped at 10/03/17 0810     vasopressin (PITRESSIN) 40 Units in D5W 40 mL infusion  2.4 Units/hr Intravenous Continuous Stopped at 10/03/17 0945      albuterol neb solution 2.5 mg  2.5 mg Nebulization Q4H PRN       piperacillin-tazobactam (ZOSYN) 2.25 g vial to attach to  ml bag  2.25 g Intravenous Q6H 2.25 g at 10/04/17 0835     dextrose 10 % 1,000 mL infusion   Intravenous Continuous PRN       glucose 40 % gel 15-30 g  15-30 g Oral Q15 Min PRN      Or     dextrose 50 % injection 25-50 mL  25-50 mL Intravenous Q15 Min PRN 25 mL at 10/02/17 1209    Or     glucagon injection 1 mg  1 mg Subcutaneous Q15 Min PRN       FIRST-VANCOMYCIN 50 solution 250 mg  250 mg Oral 4x Daily 250 mg at 10/03/17 2107     lidocaine (XYLOCAINE) 2 % topical gel   Topical Q4H PRN            Lines/ tubes   The  lines and appliances are currently used in this patient   PICC or CVP: YES  Cee cath: YES  Arterial line: YES  ETT: NO  N/O gastric tube::  NO  Feeding tube: NO  Other device(s): R femoral dialysis catheter    All the above appliances including central lines were reviewed today and assessed for the need to be continued or placed de jimbo to care for this critically ill patient.         Physical Exam:   Vital Sign Ranges  Temperature Temp  Av.2  F (36.8  C)  Min: 97.7  F (36.5  C)  Max: 98.9  F (37.2  C)   Blood pressure Systolic (24hrs), Av , Min:89 , Max:148        Diastolic (24hrs), Av, Min:54, Max:91      Pulse    Respirations Resp  Av.4  Min: 10  Max: 34   Pulse oximetry SpO2  Av.9 %  Min: 88 %  Max: 98 %       General:    Acute distress present No  Chronically ill looking YES    Neuro:   Alert:: Yes   Sedated: No  Pupils equal and reactive to light, grossly non focal: YES    HEENT:   Airway:  Orally intubated:No or Trached:No  N/O Gastric Tube or Feeding tube present: No    CV:   Rate is regular:Yes     Neck vein distended No  S1S2. Gallop No, Murmur:No    Pulm:  Bilateral chest symmetrical chest expansion on inspection:  YES  Auscultation  R: coarse expiratiory rhonchi throughout  L: Coarse expiratory rhonchi throughout     Abdomen:    Abdomen soft, non-tender.  BS normal.  No masses, organomegaly  Cee present: YES    Extremities:   No edema  Bilateral lower extremity ulceration, wrapped and covered. Surrounding erythema has resolved. Dark discoloration of non-ulcerated skin of BLE.     Skin:   Rash: No  capillary refill is normal:  YES   acrocyanosis No    Lines:  Exam of Line sites: Erythema or discharge present: No        Ancillary Data:   All laboratory and imaging data reviewed.         Hemodynamics/I & O:   BP - Mean:  [] 113    Intake/Output Summary (Last 24 hours) at 10/04/17 0937  Last data filed at 10/04/17 0800   Gross per 24 hour   Intake            919.6 ml   Output             2775 ml   Net          -1855.4 ml       Mechanical ventilation   FiO2 (%): 100 %  Resp: 20    Recent Labs  Lab 10/01/17  2145   O2PER 21          Labs and EKG   All lab results reviewed past 24 hours  All imaging results reviewed past 24 hours  Chemistry:     Recent Labs  Lab 10/04/17  0440 10/03/17  0425 10/02/17  2100 10/02/17  1440    144 146* 146*   POTASSIUM 3.2* 3.9 5.1 4.9   CHLORIDE 109 112* 119* 119*   CO2 23 21 14* 15*   BUN 26 37* 61* 59*   CR 3.19* 3.78* 5.24* 5.17*   * 141* 163* 128*   MALICK 6.6* 6.6* 6.5* 6.5*       Recent Labs  Lab 10/04/17  0440 10/01/17  2145   AST  --  16   ALT  --  16   BILITOTAL  --  0.3   ALBUMIN  --  2.0*   PROTTOTAL 4.8* 5.5*   ALKPHOS  --  197*     Hematology:    Recent Labs  Lab 10/04/17  0440 10/03/17  1755 10/03/17  0820 10/03/17  0425 10/02/17  1440 10/02/17  1045 10/01/17  2145   WBC 9.0  --   --  10.8 11.6* 10.1 6.7   RBC 2.62*  --   --  2.50* 3.01* 3.08* 3.13*   HGB 7.1* 7.5* 6.3* 6.6* 7.9* 8.1* 8.1*   HCT 21.6*  --   --  20.3* 24.7* 25.5* 26.6*     --   --  219 300 307 257       Recent Labs  Lab 10/03/17  0425 10/02/17  2100 10/02/17  1045 10/02/17  0400   INR 1.82* 2.37* 6.00* 5.71*        Cultures:    Recent Labs  Lab 10/02/17  1430 10/01/17  2255 10/01/17  2157 10/01/17  2145   CULT  Light growthYeast*  Culture in progress No growth No growth after 3 days No growth after 3 days     Blood culture 10/1: no growth 3 days x2 samples  Urine culture 10/1: no growth       Imaging Studies:   CXR:  No significant changes since CXR 10/3. Interstital infiltrates more pronounced in R apex and L lower lobe. No evidence of pneumothorax. Small L pleural effusion unchanged from prior by my read.     Scribed by June Ansari, MS4 for Dr. Dwaine Tabor MD.    Staff summary  DOS: 10/4/2017    All pertinent labs, imaging studies, physical examination, and medications have been reviewed by myself and the SICU team. I have independently examined the patient. The patient is critically ill by my examination today and requires continued ICU cares. A total of 45 minutes critical care time, exclusive of procedures, spent in the ICU in the management and care of this patient.    CNS: Alert, appropriate, comfortable.  - Pain: APAP, cautious use of hydromorphone if needed.  Pulm: Alternating between high-flow and BiPAP.  - Pulmonary insufficiency: Wean O2 as tolerated, continue pulmonary toilet.   - CXR not significantly changed.  - ANABEL: BiPAP when asleep.  - ?Pneumonia: Has LLL, RUL infiltrates, had a productive cough, has oxygen exchange issues. Nothing pathogenic on culture, but will treat on clinical grounds.   CV: 80s-100s, 110s-150s/70s-80s.  - Hypotension, ?septic shock vs heart failure: Improved. Is off pressors. Stopping steroids.  - Hypertension: May need to resume home antihypertensives as her BP is trending high.  - Echo shows normal LV function, dilation of RA/LA/RV, slightly reduced RV function. Cava shows respiratory variation.  - Paroxysmal atrial fibrillation: SR now. Off warfarin until out of ICU; continue with SQH as prophylaxis.  FEN/GI: Abdomen benign.  - Nutrition: Tolerating some diet; nutritionist is following.  : UOP remains on the low side, chemistry is looking better with HD.  - MARTÍN/ESRD:  Appreciate nephrology assistance. HD again today. In the coming few days, will need a plan for more durable HD access.  - Oliguria: Trial of bumetanide started today.  Heme: Hb 7.1 (7.5).  - Anemia: Transfused yesterday. Iron studies per renal.  - Supratherapeutic INR: Resolved.   Musculoskeletal: Extremities warm, well-perfused.   - (B)LE ulcers: Mepilex, local wound cares.  Endocrine: Glucose 118-157.  - DM2: SSI as needed.  ID: Afebrile, WBCs 9.  - Only growth on culture is light yeast from sputum.  - ?Pneumonia: Meets clinical criteria with infiltrates, productive cough, oxygenation issues. Will treat with a 7-day course of pip/tazo. Can stop vancomycin as no history of MRSA.  SICU: [facility].    I agree with the findings and jointly developed plan of care as documented in the note by Ms Ansari, MS4.    Dwaine Tabor MD, PhD  Surgical critical care  October 4, 2017, 1:30 PM      General

## 2020-10-23 NOTE — ED PROVIDER NOTES
History     Chief Complaint:  Abdominal Pain      HPI   Kathryn Banks is a 77 year old female who presents to the emergency department today for evaluation of abdominal pain. Per chart review, the patient was admitted Oct 2nd -18th for respiratory failure secondary to pneumonia with fluid overload from decompensated diastolic CHF and atrial fibrillation with RVR. During that admission, she did require a thoracentesis as well as hemodialysis and completed meropenem and vancomycin for the infection. Since the day after discharge, the patient has been having progressively worsening abdominal cramping with low stool output and intermittent diarrhea. Therefore, she presents to the emergency department for evaluation. She took some Maalox prior to arrival with little relief. There are no alleviating or aggravating factors.    Allergies:  Ciprofloxacin  Oxycodone  Lisinopril  Sulfa Drugs    Medications:    AMIODARONE HCL PO  nystatin (MYCOSTATIN) 895853 UNIT/GM POWD  cholecalciferol 5000 UNITS CAPS  clonazePAM (KLONOPIN) 0.5 MG tablet  warfarin (COUMADIN) 1 MG tablet  darbepoetin hira (ARANESP, ALBUMIN FREE,) 60 MCG/0.3ML injection  pramipexole (MIRAPEX) 0.125 MG tablet  fluticasone (FLONASE) 50 MCG/ACT spray  Calcium Carb-Cholecalciferol 500-400 MG-UNIT TABS  ipratropium - albuterol 0.5 mg/2.5 mg/3 mL (DUONEB) 0.5-2.5 (3) MG/3ML neb solution  nitroGLYcerin (NITROSTAT) 0.4 MG sublingual tablet  pravastatin (PRAVACHOL) 40 MG tablet  metoprolol (LOPRESSOR) 50 MG tablet  calcium acetate (PHOSLO) 667 MG CAPS capsule  pentoxifylline (TRENTAL) 400 MG CR tablet  Lactobacillus (ACIDOPHILUS) tablet    Past Medical History:    Carpal tunnel syndrome  Coronary atherosclerosis of native coronary artery  Obstructive sleep apnea  Osteoarthrosis  Senile cataract  Hyperlipidemia  Restless legs syndrome  Tenosynovial hand/wrist nec  Type II diabetes  Typical atrial flutter  Hypertension    Past Surgical History:   Patient admitted to ICU from Rehab unit for stridor and increased respiratory rate. Patient had a CVA on 10/13/20 and was discharged from the ICU on 10/22. Admission assessment complete, see flowsheet for full assessment. Patient does have loud breathing that sounds like snoring. Respiratory rate 18-20. Patient is on continuous nebulizer mask at 10L and in semi-fowlers positioning. Patient does occasionally remove mask, which causes his oxygen to drop to low to mid 80%s. Since his CVA, patient is non-verbal. But he does respond to voice and make eye contact with staff. Does not answer with yes or no as he had been able to do earlier. Moves extremities to painful stimuli. Will continue to monitor.   Angioplasty  Appendectomy  CABG  Miguel w/o facetec foramot/dskc 1/2 vrt seg, lumbar  Remove cataract intracap, insert lens bilateral  Total abdomen hysterectomy fibroids  Vagotomy/pyloroplasty, select  Colonoscopy x2  Cystoscopy  Endoscopy  Colonoscopy through stoma, diagnostic  Salpingo-oophorectomy, unilateral  Carpal tunnel release  UGI endoscopy diagnosis w biopsy  UGI endoscopy, simple exam  Open reduction internal fixation hip nailing left    Family History:    Diabetes  Parkinson's  PE    Social History:  The patient was unaccompanied to the ED.  Smoking Status: Former  Smokeless Tobacco: Never  Alcohol Use: Yes  Marital Status:       Review of Systems   Gastrointestinal: Positive for abdominal pain, constipation (low stool output) and diarrhea.   All other systems reviewed and are negative.    Physical Exam   First Vitals:  BP: (!) 85/55  Pulse: 89  Temp: 98.6  F (37  C)  Resp: 16  Weight: 65.8 kg (145 lb)  SpO2: 99 %    Physical Exam  General/Appearance: appears stated age, well-groomed, appears fairly comfortable s/p meds from EMS  Eyes: EOMI, no scleral injection, no icterus  ENT: MMM  Neck: supple, nl ROM, no stiffness  Cardiovascular: RRR, nl S1S2, no m/r/g, 2+ pulses in all 4 extremities, cap refill <2sec  Respiratory: CTAB, good air movement throughout, no wheezes/rhonchi/rales, no increased WOB, no retractions  Back: no lesions  GI: abd soft, non-distended, lower abdominal discomfort to palp RLQ>LLQ,  no HSM, no rebound, no guarding, nl BS  MSK: WAHL, good tone, no bony abnormality  Skin: warm and well-perfused, no rash, no edema, no ecchymosis, nl turgor  Neuro: GCS 15, alert and oriented, no gross focal neuro deficits  Psych: interacts appropriately  Heme: no petechia, no purpura, no active bleeding        Emergency Department Course     Imaging:  Radiology findings were communicated with the patient who voiced understanding of the findings.  CT Abdomen Pelvis w/o Contrast  IMPRESSION:  Closed-loop small bowel obstruction in the pelvis.  Report per radiology     Laboratory:  Laboratory findings were communicated with the patient who voiced understanding of the findings.  CBC: HGB 10.4 (L) o/w WNL. (WBC 7.4, )   CMP: Glucose 110 (H), Creatinine 3.91 (H), GFR Estimate 11 (L), Calcium 8.2 (L), Albumin 2.5 (L), Protein Total 6.2 (L), ALKPHOS 180 (H), o/w WNL.  Lipase: 124    Interventions:  0334 Morphine 4 mg IV     Emergency Department Course:  Nursing notes and vitals reviewed.  The patient was sent for a CT abdomen pelvis w/o contrast while in the emergency department, results above.   IV was inserted and blood was drawn for laboratory testing, results above.  0240: I performed an exam of the patient as documented above.  0612: I spoke with radiology regarding patient's presentation, findings, and plan of care. Closed loop bowel obstruction present on scan.  0624: I spoke with Dr. Zafar of the surgical service regarding patient's presentation, findings, and plan of care.  0628: Patient rechecked and updated.   0640: I spoke with Dr. Gunter of the hospitalist service regarding patient's presentation, findings, and plan of care.  Findings and plan explained to the Patient who consents to admission. Discussed the patient with Dr. Gunter, who will admit the patient to a Med/Surg bed for further monitoring, evaluation, and treatment.  I personally reviewed the laboratory and imaging results with the Patient and answered all related questions prior to admission.    Impression & Plan      Medical Decision Making:  This patient is a 77-year-old female with a history of abdominal surgeries who presents with lower pelvic pain. She does have some bloating on exam. CT shows a closed-loop bowel obstruction. Surgery came and visited the patient at the bedside. She is getting antibiotics and NG tube placed. The patient plans to go to the OR today. She otherwise will be brought into the hospital for further  management of her multiple medical comorbidities related to her recent admission.      Diagnosis:    ICD-10-CM    1. Complete intestinal obstruction, unspecified cause K56.601        Disposition:  Admitted to Med/Surg    Scribe Disclosure:  IJessenia, am serving as a scribe at 2:46 AM on 10/22/2017 to document services personally performed by Sienna Samaniego based on my observations and the provider's statements to me.    10/22/2017    EMERGENCY DEPARTMENT       Sienna Samaniego MD  10/22/17 0647

## 2021-03-02 NOTE — PROGRESS NOTES
Assessment and Plan:   ESRD: HD today for UF and clearance. Using R CVC. On oral vit D, getting hectorol and epo on the run. Plan 3 h run, 1 L UF as tolerated by low BP, 33 HCO3 and 4 K. Low dose heparin.             Interval History:   Afib:  Coumadin. S/P CABG in past.    Pneumonia/resp failure:  bipap and NC O2.                    Review of Systems:   Not ambulating. Up in chair to eat. No sx on dialysis.           Medications:       sodium chloride 0.9%  250-1,000 mL Intravenous Once in dialysis     epoetin hira (EPOGEN,PROCRIT) inj ESRD  4,000 Units Intravenous See Admin Instructions     doxercalciferol  4 mcg Intravenous See Admin Instructions     pramipexole  0.125 mg Oral TID     insulin aspart  1-7 Units Subcutaneous TID AC     insulin aspart  1-5 Units Subcutaneous At Bedtime     amiodarone  200 mg Oral Daily     pantoprazole  40 mg Oral QAM     insulin glargine  5 Units Subcutaneous QAM AC     ipratropium - albuterol 0.5 mg/2.5 mg/3 mL  1 vial Nebulization TID     pentoxifylline  400 mg Oral Daily     sodium chloride (PF)  10 mL Intracatheter Q8H     cholecalciferol  5,000 Units Oral Daily     - MEDICATION INSTRUCTIONS for Dialysis Patients -   Does not apply See Admin Instructions       heparin (porcine) 500 Units/hr (10/13/17 0847)     Warfarin Therapy Reminder       IV fluid REPLACEMENT ONLY       Current active medications and PTA medications reviewed, see medication list for details.            Physical Exam:   Vitals were reviewed  Patient Vitals for the past 24 hrs:   BP Temp Temp src Heart Rate Resp SpO2 Weight   10/13/17 0900 93/69 - - 146 22 94 % -   10/13/17 0845 (!) 88/67 - - 144 25 95 % -   10/13/17 0836 95/79 - - 142 19 - -   10/13/17 0830 (!) 81/65 - - 142 26 - -   10/13/17 0824 99/64 - - 142 23 (!) 86 % -   10/13/17 0755 (!) 79/60 97.2  F (36.2  C) Axillary 144 20 91 % 65 kg (143 lb 4.8 oz)   10/13/17 0655 - - - - - - 65 kg (143 lb 4.8 oz)   10/13/17 0600 105/75 - - 128 18 100 % -  ----- Message from Lina Patel DO sent at 3/2/2021  8:46 AM CST -----  Please let her know her Pap was normal.     10/13/17 0400 97/65 - - 142 20 100 % -   10/13/17 0200 105/69 - - 141 20 100 % -   10/13/17 0000 111/64 97.9  F (36.6  C) Axillary 134 11 94 % -   10/12/17 2200 115/70 - - 140 25 95 % -   10/12/17 2130 96/57 - - 130 27 94 % -   10/12/17 2100 101/70 - - 108 20 96 % -   10/12/17 2030 103/62 - - 131 14 95 % -   10/12/17 2000 103/65 - - 120 16 97 % -   10/12/17 1930 100/57 - - 124 23 97 % -   10/12/17 192 - 99.3  F (37.4  C) Oral - - - -   10/12/17 191 97/64 - - 121 30 97 % -   10/12/17 1900 102/59 - - 130 13 97 % -   10/12/17 1845 101/62 - - 113 16 97 % -   10/12/17 1830 91/56 - - 95 26 - -   10/12/17 1828 93/59 - - 124 25 97 % -   10/12/17 1827 97/62 - - 125 17 - -   10/12/17 1824 93/59 - - - - - -   10/12/17 1822 112/84 - - 138 24 98 % -   10/12/17 1821 96/58 - - 115 25 98 % -   10/12/17 1800 112/75 - - 137 17 96 % -   10/12/17 1630 (!) 65 - - 109 21 99 % -   10/12/17 1610 (!) 87/54 - - 142 12 100 % -   10/12/17 1537 - 97.6  F (36.4  C) Axillary - - - -   10/12/17 1420 - - - - - 100 % -   10/12/17 1400 100/62 - - 140 29 100 % -   10/12/17 1330 101/60 - - 144 9 98 % -   10/12/17 1300 (!) 82/55 - - 146 11 100 % -   10/12/17 1235 104/60 - - 125 17 100 % -   10/12/17 1230 (!) 75/63 - - 147 10 95 % -   10/12/17 1200 93/67 - - 128 14 98 % -   10/12/17 1100 97/74 98.6  F (37  C) Oral 93 24 100 % -   10/12/17 1030 (!) 85/65 - - 123 10 99 % -   10/12/17 1000 97/58 - - 135 14 100 % -   10/12/17 0930 (!) 81/49 - - 124 14 100 % -       Temp:  [97.2  F (36.2  C)-99.3  F (37.4  C)] 97.2  F (36.2  C)  Heart Rate:  [] 146  Resp:  [9-30] 22  BP: ()/(49-84) 93/69  FiO2 (%):  [70 %] 70 %  SpO2:  [86 %-100 %] 94 %    Temperatures:  Current - Temp: 97.2  F (36.2  C); Max - Temp  Av.1  F (36.7  C)  Min: 97.2  F (36.2  C)  Max: 99.3  F (37.4  C)  Respiration range: Resp  Av.1  Min: 9  Max: 30  Pulse range: No Data Recorded  Blood pressure range: Systolic (24hrs), Av , Min:75 , Max:115   ; Diastolic (24hrs),  Av, Min:49, Max:84    Pulse oximetry range: SpO2  Av.1 %  Min: 86 %  Max: 100 %    I/O last 3 completed shifts:  In: 450 [P.O.:420; I.V.:30]  Out: 275 [Urine:275]      Intake/Output Summary (Last 24 hours) at 10/13/17 0917  Last data filed at 10/13/17 0700   Gross per 24 hour   Intake              200 ml   Output              510 ml   Net             -310 ml       Alert, frail  Lungs with clear ant BS  Cor irreg, tachy  R CVC with no redness or tenderness  LE stasis changes, no edema    I/O 550/490   Wt adm 10/2 80.0, wt today 65.0.      Wt Readings from Last 4 Encounters:   10/13/17 65 kg (143 lb 4.8 oz)   10/01/17 77.3 kg (170 lb 8 oz)   17 77.1 kg (170 lb)   17 76.2 kg (168 lb)          Data:          Lab Results   Component Value Date     10/12/2017     10/11/2017     10/10/2017    Lab Results   Component Value Date    CHLORIDE 107 10/12/2017    CHLORIDE 110 10/11/2017    CHLORIDE 109 10/10/2017    Lab Results   Component Value Date    BUN 12 10/12/2017    BUN 18 10/11/2017    BUN 12 10/10/2017      Lab Results   Component Value Date    POTASSIUM 3.7 10/12/2017    POTASSIUM 3.7 10/11/2017    POTASSIUM 3.6 10/10/2017    Lab Results   Component Value Date    CO2 29 10/12/2017    CO2 28 10/11/2017    CO2 29 10/10/2017    Lab Results   Component Value Date    CR 2.79 10/12/2017    CR 3.68 10/11/2017    CR 2.80 10/10/2017        Recent Labs   Lab Test  10/12/17   0456  10/11/17   0420  10/10/17   0409   WBC  9.4  8.4  9.3   HGB  8.1*  8.6*  8.5*   HCT  26.6*  27.8*  27.5*   MCV  91  90  90   PLT  161  152  129*     Recent Labs   Lab Test  10/12/17   0456  10/11/17   0420  10/10/17   0409   AST  9  12  12   ALT  8  9  9   ALKPHOS  128  145  147   BILITOTAL  0.4  0.3  0.3       Recent Labs   Lab Test  10/10/17   0409  10/09/17   0441  10/08/17   0411   MAG  1.7  2.1  2.0     Recent Labs   Lab Test  10/12/17   1600  10/10/17   0409  10/09/17   0441   PHOS  3.9  2.0*  3.4     Recent  Labs   Lab Test  10/12/17   0456  10/11/17   0420  10/10/17   0409   MALICK  7.4*  7.8*  7.7*       Lab Results   Component Value Date    MALICK 7.4 (L) 10/12/2017     Lab Results   Component Value Date    WBC 9.4 10/12/2017    HGB 8.1 (L) 10/12/2017    HCT 26.6 (L) 10/12/2017    MCV 91 10/12/2017     10/12/2017     Lab Results   Component Value Date     10/12/2017    POTASSIUM 3.7 10/12/2017    CHLORIDE 107 10/12/2017    CO2 29 10/12/2017     (H) 10/13/2017     Lab Results   Component Value Date    BUN 12 10/12/2017    CR 2.79 (H) 10/12/2017     Lab Results   Component Value Date    MAG 1.7 10/10/2017     Lab Results   Component Value Date    PHOS 3.9 10/12/2017       Creatinine   Date Value Ref Range Status   10/12/2017 2.79 (H) 0.52 - 1.04 mg/dL Final   10/11/2017 3.68 (H) 0.52 - 1.04 mg/dL Final   10/10/2017 2.80 (H) 0.52 - 1.04 mg/dL Final   10/09/2017 3.98 (H) 0.52 - 1.04 mg/dL Final   10/08/2017 3.28 (H) 0.52 - 1.04 mg/dL Final   10/07/2017 2.93 (H) 0.52 - 1.04 mg/dL Final       Attestation:  I have reviewed today's vital signs, notes, medications, labs and imaging.  Seen on dialysis.      Hank Guerra MD

## 2021-05-03 NOTE — MR AVS SNAPSHOT
Kathryn WEEKS Jocelyn   11/30/2017   Anticoagulation Therapy Visit    Description:  77 year old female   Provider:  Dheeraj Jj MD   Department:  Ph Anticoag           INR as of 11/30/2017     Today's INR 1.4!      Anticoagulation Summary as of 11/30/2017     INR goal 2.0-3.0   Today's INR 1.4!   Full instructions 11/30: 2 mg; 12/1: 2 mg; 12/2: 1 mg; 12/3: 1 mg; Otherwise No maintenance plan   Next INR check 12/4/2017    Indications   Atrial fibrillation (H) (Resolved) [I48.91]  Long-term (current) use of anticoagulants [Z79.01] (Resolved) [Z79.01]         Contact Numbers     Clinic Number:         November 2017 Details    Sun Mon Tue Wed Thu Fri Sat        1               2               3               4                 5               6               7               8               9               10               11                 12               13               14               15               16               17               18                 19               20               21               22               23               24               25                 26               27               28               29               30      2 mg   See details         Date Details   11/30 This INR check               How to take your warfarin dose     To take:  2 mg Take 2 of the 1 mg tablets.           December 2017 Details    Sun Mon Tue Wed Thu Fri Sat          1      2 mg         2      1 mg           3      1 mg         4            5               6               7               8               9                 10               11               12               13               14               15               16                 17               18               19               20               21               22               23                 24               25               26               27               28               29               30                 31                      Date  Details   No additional details    Date of next INR:  12/4/2017         How to take your warfarin dose     To take:  1 mg Take 1 of the 1 mg tablets.    To take:  2 mg Take 2 of the 1 mg tablets.            Rosio

## 2021-05-17 NOTE — PROGRESS NOTES
Pt is scheduled on Wednesday for pneumonia vaccine.  Please advise on orders.  PPSV23 pending if appropriate.     SURGERY    X-Ray is no better.  K=3.8    To OR later today.    Rashard Zafar MD

## 2021-07-14 NOTE — LETTER
Transition Communication Hand-off for Care Transitions to Next Level of Care Provider    Name: Kathryn Banks  MRN #: [id number]  Primary Care Provider: Dheeraj Jj     Primary Clinic: [facility] [address] [place] [address]     Reason for Hospitalization:  Metabolic acidosis [E87.2]  Admit Date/Time: 8/12/2017  4:08 PM  Discharge Date: 8/18/17  Payor Source: Payor: BCBS / Plan: BCBS PLATINUM BLUE / Product Type: PPO /            Reason for Communication Hand-off Referral: Fragility    Discharge Plan: [facility] in [place]       Concern for non-adherence with plan of care:   Y/N n  Discharge Needs Assessment:  Needs       Most Recent Value    Equipment Currently Used at Home walker, rolling, grab bar, wound care supplies, walker, standard, shower chair, dressing device    Transportation Available family or friend will provide [Grand daughter]    Home Care [facility] [phone], Fax: [phone]          Already enrolled in Tele-monitoring program and name of program:    Follow-up specialty is recommended: Yes    Follow-up plan:  Future Appointments  Date Time Provider Department Center   8/22/2017 2:00 PM LAB FIRST FLOOR [facility] [place]   8/22/2017 2:30 PM Vibha Barfield MD [facility]       Any outstanding tests or procedures:        Radiology & Cardiology Orders     Future Labs/Procedures Complete By Expires    US TCO2 Bilateral  8/16/2017 (Approximate) 8/16/2018    Comments:    Have you called the [facility] to schedule this exam?     Please call [phone].        Referrals     Future Labs/Procedures    Home care nursing referral     Comments:    [facility] [place] [phone]  Fax [phone]    RN skilled nursing visit. RN to assess vital signs and weight, respiratory and cardiac status, pain level and activity tolerance, hydration, nutrition and bowel status and home safety.  RN to teach medication  management.    Your provider has ordered home care nursing services. If you have not been contacted within 2 days of your discharge please call the inpatient department phone number at [phone] .    Medication Therapy Management Referral     Comments:    Reason for referral:  on more than 5 medications and managing chronic disease    This service is designed to help you get the most from your medications.  A specially trained pharmacist will work closely with you and your doctors  to solve any problems related to your medications and to help you get the   best results from taking them.      The Medication Therapy Management staff will call you to schedule an appointment.    Occupational Therapy Adult Consult     Comments:    Evaluate and treat as clinically indicated.    Reason:  Deconditioning    Physical Therapy Adult Consult     Comments:    Evaluate and treat as clinically indicated.    Reason:  Deconditioning            Key Recommendations:      Donna Dickerson    AVS/Discharge Summary is the source of truth; this is a helpful guide for improved communication of patient story           show

## 2021-12-31 NOTE — PLAN OF CARE
Patient complains of R index finger swelling.   Level of Consciousness: The patient is awake, alert, and oriented with appropriate affect and speech; oriented to person, place and time.  Appearance: Sitting up in ED stretcher with no acute distress noted. Clothing and hygiene are clean and worn appropriately.  Skin: Skin is intact; color consistent with ethnicity.    Musculoskeletal: Moves all extremities well in full range of motion. Swelling to R index finger noted  Respiratory: Airway open and patent, respirations spontaneous, even and unlabored. No accessory muscles in use.   Cardiac: Regular rate, no peripheral edema noted.  Abdomen:  No distention noted.  Neurologic: PERRLA, face exhibits symmetrical expression, reports normal sensation to all extremities and face.    Patient verbalized understanding of status and plan of care.   Problem: Goal Outcome Summary  Goal: Goal Outcome Summary  Outcome: No Change  /76 (BP Location: Right arm)  Pulse 125  Temp 97.8  F (36.6  C) (Oral)  Resp 20  Wt 82.3 kg (181 lb 7 oz)  SpO2 96%  BMI 31.14 kg/m2     Neuro: A&Ox4. Slept between cares  Cardiac: NSR. No episodes of Afib overnight. VSS.   Respiratory: Sating mid-high 90s on BiPAP @35%. Frequent cough. High flow NC 50-80%.   GI/: Adequate urine output via moreira. No BM.   Diet/appetite: Tolerating renal diet. No nausea.   Activity:  Assist of 1.   Pain: At acceptable level on current regimen. Denies pain.   Skin: No new deficits notes. Bilateral leg wounds covered, C/DI.   LDA's: Moreira patent   Plan: Continue with POC. Notify primary team with changes.

## 2022-01-01 NOTE — PLAN OF CARE
Problem: Nutrition Impaired ( Infant)  Goal: Optimal Growth and Development Pattern  Outcome: Ongoing, Progressing  Intervention: Promote Effective Feeding Behavior  Recent Flowsheet Documentation  Taken 2022 040 by Priya Romero, RN  Feeding Interventions:    feeding cues monitored    feeding paced    gavage given for remainder  Taken 2022 0100 by Priya Romero, RN  Aspiration Precautions (Infant):    alert and awake before feeding    burping promoted    tube feeding placement verified  Feeding Interventions:    feeding cues monitored    feeding paced    gavage given for remainder     Problem: Infant Inpatient Plan of Care  Goal: Optimal Comfort and Wellbeing  Outcome: Ongoing, Progressing   Goal Outcome Evaluation:    VSS. Bottled well this shift, took 41, 39, and 46ml and tolerated well with no emesis, no drifting. Waking early before feedings. Voiding and stooling.   Problem: Goal Outcome Summary  Goal: Goal Outcome Summary  Outcome: Improving  [id number]: Pt somewhat hypertensive throughout shift. OVSS and afebrile on RA.  & 252. Pt A&Ox3. Pt experiencing some abdominal pain but denies need for pain medication. Pt denies nausea. INR 5.91 and creatinine 4.29. Pt on renal diet. Good appetite and tolerating well. Cee in place, good urine output. Small BM this shift. PIV SL in right AC. Some bruising on left side from fall at home. Wound on R lower leg covered with mepelex, dressing CDI, changed this shift. Pt up with standby and walker. Ambulated x1, some LEON. Bed alarm on when pt is in her bed. Will continue plan of care and update team as needed.

## 2022-02-25 NOTE — TELEPHONE ENCOUNTER
We were unable to get bck the patient due to Epic being down, she went to the ER as she should have.     Compressor switch

## 2022-04-08 NOTE — MR AVS SNAPSHOT
Kathryn Banks   1/3/2018   Anticoagulation Therapy Visit    Description:  77 year old female   Provider:  Dheeraj Jj MD   Department:  Ph Anticoag           INR as of 1/3/2018     Today's INR 2.0      Anticoagulation Summary as of 1/3/2018     INR goal 2.0-3.0   Today's INR 2.0   Full instructions 3 mg on Sun; 2 mg all other days   Next INR check 1/10/2018    Indications   Atrial fibrillation (H) (Resolved) [I48.91]  Long-term (current) use of anticoagulants [Z79.01] (Resolved) [Z79.01]         Contact Numbers     Clinic Number:         January 2018 Details    Sun Mon Tue Wed Thu Fri Sat      1               2               3      2 mg   See details      4      2 mg         5      2 mg         6      2 mg           7      3 mg         8      2 mg         9      2 mg         10            11               12               13                 14               15               16               17               18               19               20                 21               22               23               24               25               26               27                 28               29               30               31                   Date Details   01/03 This INR check       Date of next INR:  1/10/2018         How to take your warfarin dose     To take:  2 mg Take 2 of the 1 mg tablets.    To take:  3 mg Take 3 of the 1 mg tablets.           
No

## 2022-07-22 NOTE — ED NOTES
Back from CT. MD wants us to give her a liter of fluids instead of the metoprolol at this time.    No

## 2023-03-30 NOTE — TELEPHONE ENCOUNTER
VM left for Bharati asking her to call [facility] back to discuss INR of 5.9  Need to know if she was she able to get an arm draw on patient if still using faulty Roche strips?     Zo Davis RN     [FreeTextEntry1] : A discussion regarding available pain management treatment options occurred with the patient.  These included interventional, rehabilitative, pharmacological, and alternative modalities. We will proceed with the following:  \par \par Interventional treatment options:  \par - Patient is candidate for L3-L4, L4-L5, L5-S1 BVN ablation (Intracept procedure) with fluoroscopic guidance; informational materials provided\par - Patient wishes to consider further before proceeding\par - Patient has failed greater than 6 months of conservative care for his chronic low back pain\par - There is evidence of Modic endplate changes at the indicated levels which is a biomarker for vertebrogenic pain\par - see additional instructions below  \par \par Rehabilitative options:  \par - Restart trial of physical therapy\par - participation in active HEP was discussed  \par \par Medication based treatment options:  \par - initiate trial of meloxicam 15 mg daily as needed\par - continue Tylenol 500-1000 mg up to TID as needed\par - see additional instructions below  \par \par Complementary treatment options:  \par - Weight management and lifestyle modifications discussed\par \par Additional treatment recommendations as follows:  \par - Follow-up in 3 months to assess response to conservative care\par \par We have discussed the risks, benefits, and alternatives NSAID therapy including but not limited to the risk of bleeding, thrombosis, gastric mucosal irritation/ulceration, allergic reaction and kidney dysfunction; the patient verbalizes an understanding.\par \par The documentation recorded by the scribe, in my presence, accurately reflects the service I personally performed and the decisions made by me with my edits as appropriate. \par \par I, Matthias Ballesteros acting as scribe, attest that this documentation has been prepared under the direction and in the presence of Provider Harsha Decker DO.

## 2023-11-01 NOTE — TELEPHONE ENCOUNTER
"Encounter Date: 11/1/2023       History     Chief Complaint   Patient presents with    Psychiatric Evaluation     Per family running down street family states cannot control him     16-year-old male with history of DiGeorge syndrome, autism, behavioral outbursts, chronic auditory hallucinations, and recent psychiatric hospitalization and ED visits behavior concerns, who presents today with suicidal ideation.  He is here today with his grandmother and grandfather, who care for him.  Per report, he continues to have daily behavioral outbursts that have been managed with de-escalation strategies at home, along with continued auditory hallucinations.  Today, however, he was noted to be walking in the middle of the street.  He told his grandparents, that he wanted to be hit by a car so that he could be "off this earth" so he would "stop hearing the voices.  There have been no change in medications since his last visit, and they report compliance in taking.  There has been no homicidal ideations.  No reported change to his baseline hallucinations.  Of note, CT head negative in 2022.  No headache, seizure like activity.  No known significant trauma.  No fever or recent illness.        Review of patient's allergies indicates:   Allergen Reactions    Amoxicillin-pot clavulanate Rash     Past Medical History:   Diagnosis Date    Autism     DiGeorge syndrome     Pneumonia      History reviewed. No pertinent surgical history.  Family History   Problem Relation Age of Onset    No Known Problems Mother     No Known Problems Father      Social History     Tobacco Use    Smoking status: Never     Passive exposure: Current    Smokeless tobacco: Never   Substance Use Topics    Alcohol use: Never    Drug use: Never     Review of Systems   All other systems reviewed and are negative.      Physical Exam     Initial Vitals [11/01/23 1040]   BP Pulse Resp Temp SpO2   106/64 90 20 98.1 °F (36.7 °C) 97 %      MAP       --         Physical " Informed pt. No questions or concerns.  Crystal Madrigal, [facility]       Exam    Constitutional: He appears well-nourished. He is not diaphoretic. No distress.   HENT:   Left Ear: External ear normal.   Nose: Nose normal.   Mouth/Throat: Oropharynx is clear and moist.   Abnormal facies   Eyes: Conjunctivae and EOM are normal. Pupils are equal, round, and reactive to light.   Neck: Neck supple.   Normal range of motion.  Cardiovascular:  Normal rate, regular rhythm and normal heart sounds.           No murmur heard.  Pulmonary/Chest: Breath sounds normal. No respiratory distress.   Abdominal: Abdomen is soft. He exhibits no distension.   Musculoskeletal:      Cervical back: Normal range of motion and neck supple.     Neurological: He is alert. He has normal strength.   Skin: Skin is warm and dry.   Psychiatric: He is not agitated, not aggressive and not combative. Cognition and memory are impaired. He expresses impulsivity. He expresses suicidal ideation. He expresses no homicidal ideation. He expresses suicidal plans.   Follows commands.  Does not verbalize SI to this provider.  Cooperative.  Pacing when not directly interacting with patient. He is inattentive.         ED Course   Procedures  Labs Reviewed   CBC W/ AUTO DIFFERENTIAL - Abnormal; Notable for the following components:       Result Value    RDW 14.8 (*)     Gran % 75.8 (*)     Lymph % 17.4 (*)     All other components within normal limits   COMPREHENSIVE METABOLIC PANEL - Abnormal; Notable for the following components:    Glucose 118 (*)     Alkaline Phosphatase 106 (*)     All other components within normal limits   URINALYSIS, REFLEX TO URINE CULTURE - Abnormal; Notable for the following components:    Protein, UA Trace (*)     All other components within normal limits    Narrative:     Specimen Source->Urine   ACETAMINOPHEN LEVEL - Abnormal; Notable for the following components:    Acetaminophen (Tylenol), Serum <3.0 (*)     All other components within normal limits   HIV 1 / 2 ANTIBODY    Narrative:     Release to  patient->Immediate   TSH   ALCOHOL,MEDICAL (ETHANOL)   DRUG SCREEN PANEL, URINE EMERGENCY   DRUG SCREEN PANEL, URINE EMERGENCY    Narrative:     Specimen Source->Urine    ADD ON UDRUG PER ALICIA LANDIS MD.          Imaging Results    None          Medications   benztropine tablet 0.5 mg (has no administration in time range)   guanFACINE tablet 1 mg (has no administration in time range)   OLANZapine zydis disintegrating tablet 5 mg (5 mg Oral Given 11/1/23 1127)   risperiDONE tablet 2 mg (2 mg Oral Given 11/1/23 1153)   LORazepam injection 2 mg (2 mg Intramuscular Given 11/1/23 1622)     Medical Decision Making  16-year-old male with history of DiGeorge syndrome, autism, behavioral outbursts, chronic auditory hallucinations, and recent psychiatric hospitalization and ED visits behavior concerns, who presents today with suicidal ideation.    DDX: Depression, anxiety, schizophrenia, mood disorder NOS, ASD    Plan: PEC screening labs ordered.  Psychiatry consulted.  Dispo: Likely will require PEC due to SI with above mentioned plan.    Update: Psychiatry recommends PEC.  No change in medications recommended.  He remains calm and cooperative.  Family agrees with and understands plan of care.  Screening labs reassuring.  He is medically cleared.  Transferred care to Dr. Magana at 1600    Problems Addressed:  Auditory hallucinations: chronic illness or injury  Autism spectrum disorder: chronic illness or injury  DiGeorge syndrome: chronic illness or injury  Suicidal ideations: acute illness or injury    Amount and/or Complexity of Data Reviewed  Independent Historian: guardian  External Data Reviewed: labs, radiology and notes.     Details: CT head negative 2022  Labs: ordered. Decision-making details documented in ED Course.  Discussion of management or test interpretation with external provider(s): Psychiatry, SW    Risk  Prescription drug management.  Decision regarding hospitalization.                  Medically  cleared for psychiatry placement: 11/1/2023  2:10 PM            Clinical Impression:   Final diagnoses:  [D82.1] DiGeorge syndrome  [F90.2] Attention deficit hyperactivity disorder, combined type  [R46.89] Behavior problem in child  [F84.0] Autism spectrum disorder  [R44.0] Auditory hallucinations  [R45.851] Suicidal ideations (Primary)        ED Disposition Condition    Transfer to Psych Facility Stable          ED Prescriptions    None       Follow-up Information    None          Slade Johansen MD  11/01/23 1902

## 2024-04-26 NOTE — TELEPHONE ENCOUNTER
"Left message for patient to return call to clinic.     Please help she schedule ARANESP injection on float schedule in appt notes put \"lab first\".    Augustus Worthington, RN, BSN        "
Attempted to reach pt but woman that answered the phone stated she was with her nurse at the moment and took message to have her call clinic back when she is able to. When call is returned please see message below and schedule.    Christina Canas CMA ([facility])    
I left her a message to call back with respect to her Aranesp.  Electronically signed:    Jovi Parker PA-C   
Injection arrived this morning. Please call pt and schedule lab and float nurse visit when she is due for her next injection.    Christina Canas CMA ([facility])    
Patient is currently admitted in ICU.    Augustus Worthington, RN, BSN        
Per nursing supervisor Sara Vicente.    Ok to order from [employer].   Ordered this afternoon.      darbepoetin hira (ARANESP, ALBUMIN FREE,) 60 MCG/0.3ML injection 0.3 mL 99 9/20/2017  --     Sig: Inject 0.3 mLs (60 mcg) Subcutaneous every 14 days As needed for hgb<10g/dL.  If Hgb increases >1 point in 2 weeks (if blood transfusion given, use hgb PRIOR to this), SYSTOLIC BP > 180 mmHg or hgb>=10g/dL, HOLD DOSE. Dose must be within 1 week of Hgb.  Per anemia protocol with Vibha Barfield MD/Yesy Samaniego,PharmD [phone]          Call patient once received in clinic to confirm float visit.  Must have hemoglobin drawn first then follow SIG for injection parameters.    Augustus Worthington, RN, BSN      Routing to PCP as FYI as patient is receiving this injection at [facility] per Dr. Barfield.  
tc   
VTE Assessment already completed for this visit

## 2025-07-06 NOTE — ASSESSMENT & PLAN NOTE
Lab Results   Component Value Date    A1C 6.2 03/31/2017    A1C 6.6 07/03/2016    A1C 7.1 04/12/2016    A1C 6.0 06/08/2015    A1C 7.0 11/11/2014     Currently on sliding scale insulin since hospital discharge, only getting 1-2 units a day, will be discharging to home soon, previous A1C well controlled, will stop SSI and restart usual glipizide dose.   Intact

## (undated) DEVICE — SU PROLENE 6-0 C-1DA 30" 8706H

## (undated) DEVICE — Device

## (undated) DEVICE — ESU GROUND PAD ADULT W/CORD E7507

## (undated) DEVICE — SU MONOCRYL 4-0 PS-2 18" UND Y496G

## (undated) DEVICE — SU VICRYL 3-0 SH 27" UND J416H

## (undated) DEVICE — PACK MAJOR SBA15MAFSI

## (undated) DEVICE — SU SILK 3-0 TIE 12X30" A304H

## (undated) DEVICE — WIPES FOLEY CARE SURESTEP PROVON DFC100

## (undated) DEVICE — TUBING SUCTION 12"X1/4" N612

## (undated) DEVICE — SOL NACL 0.9% INJ 1000ML BAG 07983-09

## (undated) DEVICE — PACK AV FISTULA

## (undated) DEVICE — LINEN TOWEL PACK X30 5481

## (undated) DEVICE — LABEL MEDICATION SYSTEM 3303-P

## (undated) DEVICE — GLOVE PROTEXIS W/NEU-THERA 7.5  2D73TE75

## (undated) DEVICE — SU DERMABOND ADVANCED .7ML DNX12

## (undated) DEVICE — SU VICRYL 3-0 SH 27" J316H

## (undated) DEVICE — PREP CHLORAPREP 26ML TINTED ORANGE  260815

## (undated) DEVICE — SU PROLENE 7-0 BV-1DA 18" 8701H

## (undated) DEVICE — SOL WATER IRRIG 1000ML BOTTLE 2F7114

## (undated) DEVICE — SU VICRYL 4-0 PS-2 18" UND J496H

## (undated) DEVICE — DRSG STERI STRIP 1/2X4" R1547

## (undated) DEVICE — ESU GROUND PAD UNIVERSAL W/O CORD

## (undated) DEVICE — SU SILK 4-0 TIE 12X30" A303H

## (undated) DEVICE — DRAPE IOBAN INCISE 23X17" 6650EZ

## (undated) DEVICE — GLOVE PROTEXIS BLUE W/NEU-THERA 7.5  2D73EB75

## (undated) DEVICE — CATH TRAY FOLEY SURESTEP 16FR WDRAIN BAG STLK LATEX A300316A

## (undated) DEVICE — SU SILK 2-0 TIE 12X30" A305H

## (undated) DEVICE — LINEN TOWEL PACK X5 5464

## (undated) DEVICE — SUCTION CANISTER MEDIVAC LINER 3000ML W/LID 65651-530

## (undated) DEVICE — GEL ULTRASOUND AQUASONIC 20GM 01-01

## (undated) DEVICE — GLOVE PROTEXIS W/NEU-THERA 7.0  2D73TE70

## (undated) DEVICE — DRAPE LAP W/ARMBOARD 29410

## (undated) DEVICE — SU VICRYL 3-0 TIE 12X18" J904T

## (undated) DEVICE — SU PDS II 0 CTX 60" Z990G

## (undated) DEVICE — SU SILK 3-0 SH CR 8X18" C013D

## (undated) DEVICE — DRSG TELFA ISLAND 4X8" 7541

## (undated) DEVICE — SU VICRYL 2-0 TIE 12X18" J905T

## (undated) DEVICE — LINEN TOWEL PACK X6 WHITE 5487

## (undated) RX ORDER — ONDANSETRON 2 MG/ML
INJECTION INTRAMUSCULAR; INTRAVENOUS
Status: DISPENSED
Start: 2017-10-24

## (undated) RX ORDER — PROPOFOL 10 MG/ML
INJECTION, EMULSION INTRAVENOUS
Status: DISPENSED
Start: 2017-10-24

## (undated) RX ORDER — ERTAPENEM 1 G/1
INJECTION, POWDER, LYOPHILIZED, FOR SOLUTION INTRAMUSCULAR; INTRAVENOUS
Status: DISPENSED
Start: 2017-10-24

## (undated) RX ORDER — LIDOCAINE HYDROCHLORIDE 20 MG/ML
INJECTION, SOLUTION EPIDURAL; INFILTRATION; INTRACAUDAL; PERINEURAL
Status: DISPENSED
Start: 2017-10-24

## (undated) RX ORDER — METOPROLOL TARTRATE 1 MG/ML
INJECTION, SOLUTION INTRAVENOUS
Status: DISPENSED
Start: 2018-01-09

## (undated) RX ORDER — GLYCOPYRROLATE 0.2 MG/ML
INJECTION, SOLUTION INTRAMUSCULAR; INTRAVENOUS
Status: DISPENSED
Start: 2017-06-07

## (undated) RX ORDER — FENTANYL CITRATE 50 UG/ML
INJECTION, SOLUTION INTRAMUSCULAR; INTRAVENOUS
Status: DISPENSED
Start: 2017-10-24

## (undated) RX ORDER — DEXAMETHASONE SODIUM PHOSPHATE 4 MG/ML
INJECTION, SOLUTION INTRA-ARTICULAR; INTRALESIONAL; INTRAMUSCULAR; INTRAVENOUS; SOFT TISSUE
Status: DISPENSED
Start: 2017-10-24

## (undated) RX ORDER — GLYCOPYRROLATE 0.2 MG/ML
INJECTION, SOLUTION INTRAMUSCULAR; INTRAVENOUS
Status: DISPENSED
Start: 2017-10-24

## (undated) RX ORDER — FENTANYL CITRATE 50 UG/ML
INJECTION, SOLUTION INTRAMUSCULAR; INTRAVENOUS
Status: DISPENSED
Start: 2017-06-07

## (undated) RX ORDER — HEPARIN SODIUM 1000 [USP'U]/ML
INJECTION, SOLUTION INTRAVENOUS; SUBCUTANEOUS
Status: DISPENSED
Start: 2017-06-07

## (undated) RX ORDER — CEFAZOLIN SODIUM 1 G/3ML
INJECTION, POWDER, FOR SOLUTION INTRAMUSCULAR; INTRAVENOUS
Status: DISPENSED
Start: 2017-10-24

## (undated) RX ORDER — BUPIVACAINE HYDROCHLORIDE 5 MG/ML
INJECTION, SOLUTION EPIDURAL; INTRACAUDAL
Status: DISPENSED
Start: 2017-10-24

## (undated) RX ORDER — NALOXONE HYDROCHLORIDE 0.4 MG/ML
INJECTION, SOLUTION INTRAMUSCULAR; INTRAVENOUS; SUBCUTANEOUS
Status: DISPENSED
Start: 2017-06-07

## (undated) RX ORDER — FENTANYL CITRATE 50 UG/ML
INJECTION, SOLUTION INTRAMUSCULAR; INTRAVENOUS
Status: DISPENSED
Start: 2018-01-09

## (undated) RX ORDER — PROPOFOL 10 MG/ML
INJECTION, EMULSION INTRAVENOUS
Status: DISPENSED
Start: 2018-01-09

## (undated) RX ORDER — LIDOCAINE HYDROCHLORIDE 10 MG/ML
INJECTION, SOLUTION EPIDURAL; INFILTRATION; INTRACAUDAL; PERINEURAL
Status: DISPENSED
Start: 2017-06-07

## (undated) RX ORDER — FLUMAZENIL 0.1 MG/ML
INJECTION, SOLUTION INTRAVENOUS
Status: DISPENSED
Start: 2017-06-07

## (undated) RX ORDER — HEPARIN SODIUM 1000 [USP'U]/ML
INJECTION, SOLUTION INTRAVENOUS; SUBCUTANEOUS
Status: DISPENSED
Start: 2018-01-09